# Patient Record
Sex: FEMALE | Race: BLACK OR AFRICAN AMERICAN | Employment: OTHER | URBAN - METROPOLITAN AREA
[De-identification: names, ages, dates, MRNs, and addresses within clinical notes are randomized per-mention and may not be internally consistent; named-entity substitution may affect disease eponyms.]

---

## 2017-01-09 ENCOUNTER — TELEPHONE ANTICOAG (OUTPATIENT)
Dept: CARDIOLOGY CLINIC | Age: 67
End: 2017-01-09

## 2017-01-09 DIAGNOSIS — I82.629 ACUTE VENOUS EMBOLISM AND THROMBOSIS OF DEEP VEINS OF UPPER EXTREMITY, UNSPECIFIED LATERALITY (HCC): ICD-10-CM

## 2017-01-09 LAB — INR, EXTERNAL: 4.1

## 2017-01-09 NOTE — PROGRESS NOTES
Verbal order and read back per Arron Ford, ROSSI  The INR is above the therapeutic range. Ask the patient about bleeding complications. Please make the following adjustments to Coumadin dosing: Hold X 1, 6 mg X 1, 3 mg X 1. Recheck INR on Thursday. Repeat the INR in 3 days.    Patient informed of instructions, read back and verbalized understanding Da Dover LPN

## 2017-01-12 ENCOUNTER — OFFICE VISIT (OUTPATIENT)
Dept: ORTHOPEDIC SURGERY | Facility: CLINIC | Age: 67
End: 2017-01-12

## 2017-01-12 VITALS
DIASTOLIC BLOOD PRESSURE: 80 MMHG | TEMPERATURE: 98 F | WEIGHT: 245 LBS | HEART RATE: 70 BPM | BODY MASS INDEX: 38.37 KG/M2 | SYSTOLIC BLOOD PRESSURE: 166 MMHG

## 2017-01-12 DIAGNOSIS — M25.562 LEFT KNEE PAIN, UNSPECIFIED CHRONICITY: ICD-10-CM

## 2017-01-12 DIAGNOSIS — M17.12 PRIMARY OSTEOARTHRITIS OF LEFT KNEE: Primary | ICD-10-CM

## 2017-01-12 DIAGNOSIS — M17.11 PRIMARY OSTEOARTHRITIS OF RIGHT KNEE: ICD-10-CM

## 2017-01-12 DIAGNOSIS — M25.462 KNEE EFFUSION, LEFT: ICD-10-CM

## 2017-01-12 RX ORDER — BUPIVACAINE HYDROCHLORIDE 2.5 MG/ML
4 INJECTION, SOLUTION EPIDURAL; INFILTRATION; INTRACAUDAL ONCE
Qty: 4 ML | Refills: 0
Start: 2017-01-12 | End: 2017-01-12

## 2017-01-12 RX ORDER — BUPIVACAINE HYDROCHLORIDE 2.5 MG/ML
10 INJECTION, SOLUTION EPIDURAL; INFILTRATION; INTRACAUDAL ONCE
Qty: 10 ML | Refills: 0
Start: 2017-01-12 | End: 2017-01-12

## 2017-01-12 RX ORDER — BETAMETHASONE SODIUM PHOSPHATE AND BETAMETHASONE ACETATE 3; 3 MG/ML; MG/ML
3 INJECTION, SUSPENSION INTRA-ARTICULAR; INTRALESIONAL; INTRAMUSCULAR; SOFT TISSUE ONCE
Qty: 0.5 ML | Refills: 0
Start: 2017-01-12 | End: 2017-01-12

## 2017-01-12 NOTE — PATIENT INSTRUCTIONS
Knee Arthritis: Exercises  Your Care Instructions  Here are some examples of exercises for knee arthritis. Start each exercise slowly. Ease off the exercise if you start to have pain. Your doctor or physical therapist will tell you when you can start these exercises and which ones will work best for you. How to do the exercises  Knee flexion with heel slide    1. Lie on your back with your knees bent. 2. Slide your heel back by bending your affected knee as far as you can. Then hook your other foot around your ankle to help pull your heel even farther back. 3. Hold for about 6 seconds, then rest for up to 10 seconds. 4. Repeat 8 to 12 times. 5. Switch legs and repeat steps 1 through 4, even if only one knee is sore. Quad sets    1. Sit with your affected leg straight and supported on the floor or a firm bed. Place a small, rolled-up towel under your knee. Your other leg should be bent, with that foot flat on the floor. 2. Tighten the thigh muscles of your affected leg by pressing the back of your knee down into the towel. 3. Hold for about 6 seconds, then rest for up to 10 seconds. 4. Repeat 8 to 12 times. 5. Switch legs and repeat steps 1 through 4, even if only one knee is sore. Straight-leg raises to the front    1. Lie on your back with your good knee bent so that your foot rests flat on the floor. Your affected leg should be straight. Make sure that your low back has a normal curve. You should be able to slip your hand in between the floor and the small of your back, with your palm touching the floor and your back touching the back of your hand. 2. Tighten the thigh muscles in your affected leg by pressing the back of your knee flat down to the floor. Hold your knee straight. 3. Keeping the thigh muscles tight and your leg straight, lift your affected leg up so that your heel is about 12 inches off the floor. Hold for about 6 seconds, then lower slowly.   4. Relax for up to 10 seconds between repetitions. 5. Repeat 8 to 12 times. 6. Switch legs and repeat steps 1 through 5, even if only one knee is sore. Active knee flexion    1. Lie on your stomach with your knees straight. If your kneecap is uncomfortable, roll up a washcloth and put it under your leg just above your kneecap. 2. Lift the foot of your affected leg by bending the knee so that you bring the foot up toward your buttock. If this motion hurts, try it without bending your knee quite as far. This may help you avoid any painful motion. 3. Slowly move your leg up and down. 4. Repeat 8 to 12 times. 5. Switch legs and repeat steps 1 through 4, even if only one knee is sore. Quadriceps stretch (facedown)    1. Lie flat on your stomach, and rest your face on the floor. 2. Wrap a towel or belt strap around the lower part of your affected leg. Then use the towel or belt strap to slowly pull your heel toward your buttock until you feel a stretch. 3. Hold for about 15 to 30 seconds, then relax your leg against the towel or belt strap. 4. Repeat 2 to 4 times. 5. Switch legs and repeat steps 1 through 4, even if only one knee is sore. Stationary exercise bike    If you do not have a stationary exercise bike at home, you can find one to ride at your local health club or community center. 1. Adjust the height of the bike seat so that your knee is slightly bent when your leg is extended downward. If your knee hurts when the pedal reaches the top, you can raise the seat so that your knee does not bend as much. 2. Start slowly. At first, try to do 5 to 10 minutes of cycling with little to no resistance. Then increase your time and the resistance bit by bit until you can do 20 to 30 minutes without pain. 3. If you start to have pain, rest your knee until your pain gets back to the level that is normal for you. Or cycle for less time or with less effort. Follow-up care is a key part of your treatment and safety.  Be sure to make and go to all appointments, and call your doctor if you are having problems. It's also a good idea to know your test results and keep a list of the medicines you take. Where can you learn more? Go to http://aren-mirella.info/. Enter C159 in the search box to learn more about \"Knee Arthritis: Exercises. \"  Current as of: May 23, 2016  Content Version: 11.1  © 2006-2016 [employer]. Care instructions adapted under license by [employer] (which disclaims liability or warranty for this information). If you have questions about a medical condition or this instruction, always ask your healthcare professional. [place] [address] any warranty or liability for your use of this information. Joint Injections: Care Instructions  Your Care Instructions  Joint injections are shots into a joint, such as the knee. They may be used to put in medicines, such as pain relievers. Or they can be used to take out fluid. Sometimes the fluid is tested in a lab. This can help find the cause of a joint problem. A corticosteroid, or steroid, shot is used to reduce inflammation in tendons or joints. It is often used to treat problems such as arthritis, tendinitis, and bursitis. Steroids can be injected directly into a painful, inflamed joint. They can also help reduce inflammation of a bursa. A bursa is a sac of fluid. It cushions and lubricates areas where tendons, ligaments, skin, muscles, or bones rub against each other. A steroid shot can sometimes help with short-term pain relief when other treatments haven't worked. If steroid shots help, pain may improve for weeks or months. Follow-up care is a key part of your treatment and safety. Be sure to make and go to all appointments, and call your doctor if you are having problems. It's also a good idea to know your test results and keep a list of the medicines you take. How can you care for yourself at home?   · Put ice or a cold pack on the area for 10 to 20 minutes at a time. Put a thin cloth between the ice and your skin. · Take anti-inflammatory medicines to reduce pain, swelling, or inflammation. These include ibuprofen (Advil, Motrin) and naproxen (Aleve). Read and follow all instructions on the label. · Avoid strenuous activities for several days, especially those that put stress on the area where you got the shot. · If you have dressings over the area, keep them clean and dry. You may remove them when your doctor tells you to. When should you call for help? Call your doctor now or seek immediate medical care if:  · You have signs of infection, such as:  ¨ Increased pain, swelling, warmth, or redness. ¨ Red streaks leading from the site. ¨ Pus draining from the site. ¨ A fever. Watch closely for changes in your health, and be sure to contact your doctor if you have any problems. Where can you learn more? Go to http://aren-mirella.info/. Enter N616 in the search box to learn more about \"Joint Injections: Care Instructions. \"  Current as of: May 23, 2016  Content Version: 11.1  © 7195-7395 [employer]. Care instructions adapted under license by [employer] (which disclaims liability or warranty for this information). If you have questions about a medical condition or this instruction, always ask your healthcare professional. [address] any warranty or liability for your use of this information.

## 2017-01-12 NOTE — PROGRESS NOTES
Patient: Kerrie Amaya                MRN: [id number]       SSN: [id number]  YOB: 1950        AGE: 77 y.o. SEX: female    PCP: Mina Gr MD  01/12/17    Chief Complaint   Patient presents with    Knee Pain     Knee     HISTORY:  Kerrie Amaya is a 77 y.o. female who is seen for left knee pain. She notes increased left knee pain since 12/30/16 when she bent her knee to take off her boots and felt a sharp pain. She notes pain with standing and walking. She denies any previous knee injury or trauma. She was previously seen in December of 2014 for left knee pain. She completed left knee Euflexxa series in December of 2014 with benefit. She reports a h/o gout. At times, her pain is severe, and she notes stiffness after sitting for long periods of time. She had temporary response to previous knee cortisone injections. Occupation, etc:  Ms. Fredis Jamison retired in [employer] [address] [place] [address] due to a total heart block requiring automatic defibrillator. She worked as a  in the [employer] in the 2nd grade. She recently retured from [place] to take care of her daughter who recently underwent robotic hysterectomy. She has three grandchildren--ages 23, 21, and 15. She is diabetic and hypertensive. She reports that her most recent HA1c value was 6.0. She reports that her weight is stable at about 245 pounds. She has a pacemaker for a heart problem and is currently taking Coumadin. Her mother is also a patient, Brittney Pike.       Lab Results   Component Value Date/Time    Hemoglobin A1c 6.2 08/28/2012 11:25 AM     Weight Metrics 1/12/2017 11/1/2016 4/11/2016 10/12/2015 7/20/2015 6/11/2015 3/2/2015   Weight 245 lb 244 lb 240 lb 248 lb 240 lb 237 lb 246 lb   BMI 38.37 kg/m2 38.22 kg/m2 37.58 kg/m2 38.83 kg/m2 37.58 kg/m2 37.11 kg/m2 38.52 kg/m2     Patient Active Problem List   Diagnosis Code    Cardiomyopathy, nonischemic (HCC) I42.9    Complete heart block ([address] I44.2    Biventricular implantable cardioverter-defibrillator in situ Z95.810    LBBB (left bundle branch block) I44.7    Type II or unspecified type diabetes mellitus with neurological manifestations, not stated as uncontrolled E11.49    Dyslipidemia E78.5    Diabetic peripheral neuropathy associated with type 2 diabetes mellitus ([facility]) E11.42    HALI (obstructive sleep apnea) G47.33    Pain due to any device, implant or graft T85.848A    AICD generator infection ([facility] [address] [address] T82. 7XXA    Difficult airway for intubation T88. 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 Cardiovascular: negative for chest pain, claudication, leg swelling, SOB, GROVER   Gastrointestinal: Negative for pain, N/V/C/D, Blood in stool or urine, dysuria,  hematuria, incontinence, pelvic pain. Musculoskeletal: See HPI   Neurological: Negative for dizziness and weakness. Negative for headaches, Visual changes, confusion, seizures   Phychiatric/Behavioral: Negative for depression, memory loss, substance  abuse. Extremities: Negative for hair changes, rash, or skin lesion changes. Hematologic: Negative for bleeding problems, bruising, pallor or swollen lymph  nodes   Peripheral Vascular: No calf pain, no circulation deficits.     Social History     Social History    Marital status:      Spouse name: N/A    Number of children: N/A    Years of education: N/A     Occupational History    Not on file. Social History Main Topics    Smoking status: Never Smoker    Smokeless tobacco: Never Used    Alcohol use No    Drug use: No    Sexual activity: Yes     Partners: Male     Other Topics Concern    Not on file     Social History Narrative      Allergies   Allergen Reactions    Vancomycin Itching    Ampicillin Itching    Bactrim [Sulfamethoxazole-Trimethoprim] Unknown (comments)    Ciprofloxacin Itching    Codeine Other (comments)     Jumpy feeling    Crestor [Rosuvastatin] Itching    Darvocet A500 [Propoxyphene N-Acetaminophen] Itching    Demerol [Meperidine] Itching    Levaquin [Levofloxacin] Itching    Lipitor [Atorvastatin] Myalgia    Magnesium Oxide Itching     nausea    Minocin [Minocycline] Unknown (comments)    Pcn [Penicillins] Itching    Pravachol [Pravastatin] Swelling     Swelling in mouth     Sulfa (Sulfonamide Antibiotics) Itching    Ultracet [Tramadol-Acetaminophen] Itching    Vicodin [Hydrocodone-Acetaminophen] Unknown (comments)    Vytorin 10-10 [Ezetimibe-Simvastatin] Myalgia    Percodan [Oxycodone Hcl-Oxycodone-Asa] Itching      Current Outpatient Prescriptions   Medication Sig    bumetanide (BUMEX) 1 mg tablet TAKE 1 TABLET TWICE A DAY    carvedilol (COREG) 25 mg tablet Take 1 Tab by mouth two (2) times a day.  pravastatin (PRAVACHOL) 40 mg tablet Take 40 mg by mouth nightly.  cyclobenzaprine (FLEXERIL) 10 mg tablet Take 10 mg by mouth as needed.  potassium chloride (KLOR-CON M20) 20 mEq tablet Take 2 Tabs by mouth two (2) times a day. Or as directed    insulin glargine (LANTUS) 100 unit/mL injection 20 Units by SubCUTAneous route two (2) times a day.  allopurinol (ZYLOPRIM) 100 mg tablet Take 100 mg by mouth two (2) times a day.  ferrous sulfate 325 mg (65 mg iron) tablet Take 1 Tab by mouth two (2) times daily (with meals).     Cholecalciferol, Vitamin D3, (VITAMIN D) 5,000 unit Tab Take 1 Tab by mouth daily.  warfarin (COUMADIN) 2 mg tablet TAKE 3 TABLETS DAILY OR AS DIRECTED BY PRESCRIBER'S OFFICE    warfarin (COUMADIN) 2 mg tablet Take 3 tablets by mouth daily or as directed by our office.  OTHER Take 2 Tabs by mouth two (2) times a day. NeuRx-TF - total formulation for peripheral nerve health    gabapentin (NEURONTIN) 300 mg tablet Take 300 mg by mouth two (2) times daily as needed.  sitaGLIPtin (JANUVIA) 50 mg tablet Take 1 Tab by mouth daily.  insulin aspart (NOVOLOG) 100 unit/mL injection INITIATE INSULIN CORRECTIVE PROTOCOL (HR): Normal Insulin Sensitivity  For Blood Sugar (mg/dL) of:    Less than 150 =   0 units          150 -199 =   2 units 200 -249 =   4 units 250 -299 =   6 units 300 -349 =   8 units 350 and above =   10 units If 2 glucose readings are above 200 mg/dL     No current facility-administered medications for this visit. PHYSICAL EXAMINATION:  Visit Vitals    /80 (BP 1 Location: Left arm, BP Patient Position: Sitting)    Pulse 70    Temp 98 °F (36.7 °C) (Oral)    Wt 245 lb (111.1 kg)    BMI 38.37 kg/m2      ORTHO EXAMINATION:  Examination Right knee Left knee   Skin Intact Intact   Range of motion 100-0 85-10   Effusion - +   Medial joint line tenderness + +   Lateral joint line tenderness + +   Popliteal tenderness - -   Osteophytes palpable + +   Jesus Manuels - -   Patella crepitus + +   Anterior drawer - -   Lateral laxity + +   Medial laxity - -   Varus deformity - -   Valgus deformity + +   Pretibial edema - -   Calf tenderness - -     PROCEDURE:  After timeout and under sterile conditions, Ms. Carol Whittaker had her left knee aspirated minimal cc of clear fluid. The fluid was discarded. After discussing treatment options, patient's left knee was injected with 4 cc Marcaine and 1/2 cc Celestone.     Chart reviewed for the following:   Christal Miller MD, have reviewed the History, Physical and updated the Allergic reactions for Carmen Rodriguez     TIME OUT performed immediately prior to start of procedure:  Sherry Haywood MD, have performed the following reviews on Carmen Rodriguez prior to the start of the procedure:            * Patient was identified by name and date of birth   * Agreement on procedure being performed was verified  * Risks and Benefits explained to the patient  * Procedure site verified and marked as necessary  * Patient was positioned for comfort  * Consent was obtained     Time: 9:21 AM     Date of procedure: 1/12/2017    Procedure performed by:  Zeeshan Pickard MD    Ms. Gonzalez tolerated the procedure well with no complications. RADIOGRAPHS:  XR LEFT KNEE 2/28/11  IMPRESSION:  Degenerative changes left knee. IMPRESSION:      ICD-10-CM ICD-9-CM    1. Primary osteoarthritis of left knee M17.12 715.16 betamethasone (CELESTONE SOLUSPAN) 6 mg/mL injection      BETAMETHASONE ACETATE & SODIUM PHOSPHATE INJECTION 3 MG EA.      DRAIN/INJECT LARGE JOINT/BURSA      bupivacaine, PF, (MARCAINE, PF,) 0.25 % (2.5 mg/mL) injection      bupivacaine, PF, (MARCAINE, PF,) 0.25 % (2.5 mg/mL) injection      PROCEDURE AUTHORIZATION TO     Severe, lateral compartment   2. Left knee pain, unspecified chronicity M25.562 719.46 betamethasone (CELESTONE SOLUSPAN) 6 mg/mL injection      BETAMETHASONE ACETATE & SODIUM PHOSPHATE INJECTION 3 MG EA.      DRAIN/INJECT LARGE JOINT/BURSA      bupivacaine, PF, (MARCAINE, PF,) 0.25 % (2.5 mg/mL) injection      bupivacaine, PF, (MARCAINE, PF,) 0.25 % (2.5 mg/mL) injection   3. Primary osteoarthritis of right knee M17.11 715.16     Severe, lateral compartment   4.  Knee effusion, left M25.462 719.06 betamethasone (CELESTONE SOLUSPAN) 6 mg/mL injection      BETAMETHASONE ACETATE & SODIUM PHOSPHATE INJECTION 3 MG EA.      DRAIN/INJECT LARGE JOINT/BURSA      bupivacaine, PF, (MARCAINE, PF,) 0.25 % (2.5 mg/mL) injection      bupivacaine, PF, (MARCAINE, PF,) 0.25 % (2.5 mg/mL) injection PLAN:  After timeout and under sterile conditions, Ms. Verito Reid had her left knee aspirated minimal cc of clear fluid. The fluid was discarded. After discussing treatment options, patient's left knee was injected with 4 cc Marcaine and 1/2 cc Celestone. I will see her back in about one month. Consider visco supplementation if pain continues. She will be referred for bariatric surgery or medicl weight loss consultation. We discussed possible need for left knee arthroplasty at some time in the future pending weight loss below 200#. She has several medical risk factors which we discussed in detail today.      Scribed by [employer] ([facility]) as dictated by Mary Ferro MD

## 2017-01-31 ENCOUNTER — TELEPHONE ANTICOAG (OUTPATIENT)
Dept: CARDIOLOGY CLINIC | Age: 67
End: 2017-01-31

## 2017-01-31 DIAGNOSIS — I82.629 ACUTE VENOUS EMBOLISM AND THROMBOSIS OF DEEP VEINS OF UPPER EXTREMITY, UNSPECIFIED LATERALITY (HCC): ICD-10-CM

## 2017-01-31 LAB — INR, EXTERNAL: 2

## 2017-01-31 NOTE — PROGRESS NOTES
Verbal order and read back per Irene Partida NP  The INR is stable and therapeutic.  Continue same dose of coumadin and recheck in 2 weeks  Continue Coumadin 6mg daily except 4mg on Mon  Patient informed of instructions, read back and verbalized understanding Nancy Waters LPN

## 2017-02-09 ENCOUNTER — OFFICE VISIT (OUTPATIENT)
Dept: ORTHOPEDIC SURGERY | Facility: CLINIC | Age: 67
End: 2017-02-09

## 2017-02-09 VITALS
HEART RATE: 70 BPM | TEMPERATURE: 98 F | WEIGHT: 245 LBS | BODY MASS INDEX: 38.37 KG/M2 | DIASTOLIC BLOOD PRESSURE: 85 MMHG | SYSTOLIC BLOOD PRESSURE: 169 MMHG

## 2017-02-09 DIAGNOSIS — M17.11 PRIMARY OSTEOARTHRITIS OF RIGHT KNEE: ICD-10-CM

## 2017-02-09 DIAGNOSIS — Z95.0 PACEMAKER: ICD-10-CM

## 2017-02-09 DIAGNOSIS — M25.462 KNEE EFFUSION, LEFT: ICD-10-CM

## 2017-02-09 DIAGNOSIS — M25.562 LEFT KNEE PAIN, UNSPECIFIED CHRONICITY: ICD-10-CM

## 2017-02-09 DIAGNOSIS — M17.12 PRIMARY OSTEOARTHRITIS OF LEFT KNEE: Primary | ICD-10-CM

## 2017-02-09 RX ORDER — BETAMETHASONE SODIUM PHOSPHATE AND BETAMETHASONE ACETATE 3; 3 MG/ML; MG/ML
3 INJECTION, SUSPENSION INTRA-ARTICULAR; INTRALESIONAL; INTRAMUSCULAR; SOFT TISSUE ONCE
Qty: 0.5 ML | Refills: 0
Start: 2017-02-09 | End: 2017-02-09

## 2017-02-09 RX ORDER — BUPIVACAINE HYDROCHLORIDE 2.5 MG/ML
4 INJECTION, SOLUTION EPIDURAL; INFILTRATION; INTRACAUDAL ONCE
Qty: 4 ML | Refills: 0
Start: 2017-02-09 | End: 2017-02-09

## 2017-02-09 NOTE — PROGRESS NOTES
Patient: Humble Cho                MRN: [id number]       SSN: [id number]  YOB: 1950        AGE: 77 y.o. SEX: female    PCP: Stanislaw Still MD  02/09/17    Chief Complaint   Patient presents with    Knee Pain     Left     HISTORY:  Humble Cho is a 77 y.o. female who is seen for left knee pain. She notes increased left knee pain since 12/30/16 when she bent her knee to take off her boots and felt a sharp pain. She notes pain with standing and walking. She denies any previous knee injury or trauma. She was previously seen in December of 2014 for left knee pain. She completed left knee Euflexxa series in December of 2014 with benefit. She reports a h/o gout. At times, her pain is severe, and she notes stiffness after sitting for long periods of time. She had temporary response to previous knee cortisone injections. She reports that she has had left knee pain for the past 5 years. She sees a podiatrist, Dr. Ambrocio Correa, for right foot pain. Pain Assessment  2/9/2017   Location of Pain Knee   Location Modifiers Left   Severity of Pain 9   Quality of Pain Dull;Aching   Duration of Pain A few minutes   Frequency of Pain Several days a week   Aggravating Factors Bending   Limiting Behavior Some   Relieving Factors Rest     Occupation, etc:  Ms. Jones Calero retired in [address] [place] [address] due to a total heart block requiring automatic defibrillator. She previously worked as a  in the [employer] in the 2nd grade. She recently retured from [place] to take care of her daughter who recently underwent robotic hysterectomy. She has three grandchildren--ages 23, 21, and 15. She is diabetic and hypertensive. She reports that her weight is stable. Current weight is 245 pounds. She has a pacemaker for a heart problem and is currently taking Coumadin. Her mother is also a patient, Richard Cortez. She is planning on taking a trip to [place] in May for her niece's wedding.   She states that she no longer drives due to her knee pains. Lab Results   Component Value Date/Time    Hemoglobin A1c 6.2 08/28/2012 11:25 AM     Weight Metrics 2/9/2017 1/12/2017 11/1/2016 4/11/2016 10/12/2015 7/20/2015 6/11/2015   Weight 245 lb 245 lb 244 lb 240 lb 248 lb 240 lb 237 lb   BMI 38.37 kg/m2 38.37 kg/m2 38.22 kg/m2 37.58 kg/m2 38.83 kg/m2 37.58 kg/m2 37.11 kg/m2     Patient Active Problem List   Diagnosis Code    Cardiomyopathy, nonischemic ([facility]) I42.9    Complete heart block ([facility]) I44.2    Biventricular implantable cardioverter-defibrillator in situ Z95.810    LBBB (left bundle branch block) I44.7    Type II or unspecified type diabetes mellitus with neurological manifestations, not stated as uncontrolled E11.49    Dyslipidemia E78.5    Diabetic peripheral neuropathy associated with type 2 diabetes mellitus ([facility] [address] E11.42    HALI (obstructive sleep apnea) G47.33    Pain due to any device, implant or graft T85.848A    AICD generator infection ([facility] [address] T82. 7XXA    Difficult airway for intubation T88. 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 Cardiovascular: negative for chest pain, claudication, leg swelling, SOB, GROVER   Gastrointestinal: Negative for pain, N/V/C/D, Blood in stool or urine, dysuria,  hematuria, incontinence, pelvic pain.    Musculoskeletal: See HPI   Neurological: Negative for dizziness and weakness. Negative for headaches, Visual changes, confusion, seizures   Phychiatric/Behavioral: Negative for depression, memory loss, substance  abuse. Extremities: Negative for hair changes, rash, or skin lesion changes. Hematologic: Negative for bleeding problems, bruising, pallor or swollen lymph  nodes   Peripheral Vascular: No calf pain, no circulation deficits. Social History     Social History    Marital status:      Spouse name: N/A    Number of children: N/A    Years of education: N/A     Occupational History    Not on file. Social History Main Topics    Smoking status: Never Smoker    Smokeless tobacco: Never Used    Alcohol use No    Drug use: No    Sexual activity: Yes     Partners: Male     Other Topics Concern    Not on file     Social History Narrative      Allergies   Allergen Reactions    Vancomycin Itching    Ampicillin Itching    Bactrim [Sulfamethoxazole-Trimethoprim] Unknown (comments)    Ciprofloxacin Itching    Codeine Other (comments)     Jumpy feeling    Crestor [Rosuvastatin] Itching    Darvocet A500 [Propoxyphene N-Acetaminophen] Itching    Demerol [Meperidine] Itching    Levaquin [Levofloxacin] Itching    Lipitor [Atorvastatin] Myalgia    Magnesium Oxide Itching     nausea    Minocin [Minocycline] Unknown (comments)    Pcn [Penicillins] Itching    Pravachol [Pravastatin] Swelling     Swelling in mouth     Sulfa (Sulfonamide Antibiotics) Itching    Ultracet [Tramadol-Acetaminophen] Itching    Vicodin [Hydrocodone-Acetaminophen] Unknown (comments)    Vytorin 10-10 [Ezetimibe-Simvastatin] Myalgia    Percodan [Oxycodone Hcl-Oxycodone-Asa] Itching      Current Outpatient Prescriptions   Medication Sig    bumetanide (BUMEX) 1 mg tablet TAKE 1 TABLET TWICE A DAY    carvedilol (COREG) 25 mg tablet Take 1 Tab by mouth two (2) times a day.     cyclobenzaprine (FLEXERIL) 10 mg tablet Take 10 mg by mouth as needed.  OTHER Take 2 Tabs by mouth two (2) times a day. NeuRx-TF - total formulation for peripheral nerve health    potassium chloride (KLOR-CON M20) 20 mEq tablet Take 2 Tabs by mouth two (2) times a day. Or as directed    insulin glargine (LANTUS) 100 unit/mL injection 20 Units by SubCUTAneous route two (2) times a day.  Cholecalciferol, Vitamin D3, (VITAMIN D) 5,000 unit Tab Take 1 Tab by mouth daily.  warfarin (COUMADIN) 2 mg tablet TAKE 3 TABLETS DAILY OR AS DIRECTED BY PRESCRIBER'S OFFICE    pravastatin (PRAVACHOL) 40 mg tablet Take 40 mg by mouth nightly.  warfarin (COUMADIN) 2 mg tablet Take 3 tablets by mouth daily or as directed by our office.  gabapentin (NEURONTIN) 300 mg tablet Take 300 mg by mouth two (2) times daily as needed.  allopurinol (ZYLOPRIM) 100 mg tablet Take 100 mg by mouth two (2) times a day.  sitaGLIPtin (JANUVIA) 50 mg tablet Take 1 Tab by mouth daily.  ferrous sulfate 325 mg (65 mg iron) tablet Take 1 Tab by mouth two (2) times daily (with meals).  insulin aspart (NOVOLOG) 100 unit/mL injection INITIATE INSULIN CORRECTIVE PROTOCOL (HR): Normal Insulin Sensitivity  For Blood Sugar (mg/dL) of:    Less than 150 =   0 units          150 -199 =   2 units 200 -249 =   4 units 250 -299 =   6 units 300 -349 =   8 units 350 and above =   10 units If 2 glucose readings are above 200 mg/dL     No current facility-administered medications for this visit.        PHYSICAL EXAMINATION:  Visit Vitals    /85 (BP 1 Location: Left arm, BP Patient Position: Sitting)    Pulse 70    Temp 98 °F (36.7 °C) (Oral)    Wt 245 lb (111.1 kg)    BMI 38.37 kg/m2      ORTHO EXAMINATION:  Examination Right knee Left knee   Skin Intact Intact, varicose veins   Range of motion 100-0 85-10   Effusion - +   Medial joint line tenderness + +   Lateral joint line tenderness + +   Popliteal tenderness - -   Osteophytes palpable + +   Jesus Manuels - -   Patella crepitus + + Anterior drawer - -   Lateral laxity + +   Medial laxity - -   Varus deformity - -   Valgus deformity + +   Pretibial edema 2+ 2+   Calf tenderness - -     PROCEDURE:  After discussing treatment options, patient's left knee was injected with 4 cc Marcaine and 1/2 cc Celestone. Chart reviewed for the following:   Je Santamaria MD, have reviewed the History, Physical and updated the Allergic reactions for Parker Pernell     TIME OUT performed immediately prior to start of procedure:  I, David Green MD, have performed the following reviews on Kiana Pernell prior to the start of the procedure:            * Patient was identified by name and date of birth   * Agreement on procedure being performed was verified  * Risks and Benefits explained to the patient  * Procedure site verified and marked as necessary  * Patient was positioned for comfort  * Consent was obtained     Time: 9:55 AM     Date of procedure: 2/9/2017    Procedure performed by:  David Green MD    Ms. Gonzalez tolerated the procedure well with no complications. RADIOGRAPHS:  XR LEFT KNEE 2/28/11  IMPRESSION:  Degenerative changes left knee. IMPRESSION:      ICD-10-CM ICD-9-CM    1. Primary osteoarthritis of left knee M17.12 715.16 betamethasone (CELESTONE SOLUSPAN) 6 mg/mL injection      BETAMETHASONE ACETATE & SODIUM PHOSPHATE INJECTION 3 MG EA.      DRAIN/INJECT LARGE JOINT/BURSA      bupivacaine, PF, (MARCAINE, PF,) 0.25 % (2.5 mg/mL) injection      PROCEDURE AUTHORIZATION TO    2. Left knee pain, unspecified chronicity M25.562 719.46 betamethasone (CELESTONE SOLUSPAN) 6 mg/mL injection      BETAMETHASONE ACETATE & SODIUM PHOSPHATE INJECTION 3 MG EA.      DRAIN/INJECT LARGE JOINT/BURSA      bupivacaine, PF, (MARCAINE, PF,) 0.25 % (2.5 mg/mL) injection   3. Knee effusion, left M25.462 719.06    4. Primary osteoarthritis of right knee M17.11 715.16    5.  Pacemaker Z95.0 V45.01      PLAN:  After discussing treatment options, patient's left knee was injected with 4 cc Marcaine and 1/2 cc Celestone. I will see her back as needed. Consider visco supplementation if pain continues. She will be referred for bariatric surgery or medical weight loss consultation. We discussed possible need for left knee arthroplasty at some time in the future pending weight loss below 200#. She has several medical risk factors which we discussed in detail today.       Scribed by [employer] ([address] [place] [address] [address]) as dictated by Duke Kennedy MD

## 2017-02-09 NOTE — PATIENT INSTRUCTIONS
Knee Arthritis: Exercises  Your Care Instructions  Here are some examples of exercises for knee arthritis. Start each exercise slowly. Ease off the exercise if you start to have pain. Your doctor or physical therapist will tell you when you can start these exercises and which ones will work best for you. How to do the exercises  Knee flexion with heel slide    1. Lie on your back with your knees bent. 2. Slide your heel back by bending your affected knee as far as you can. Then hook your other foot around your ankle to help pull your heel even farther back. 3. Hold for about 6 seconds, then rest for up to 10 seconds. 4. Repeat 8 to 12 times. 5. Switch legs and repeat steps 1 through 4, even if only one knee is sore. Quad sets    1. Sit with your affected leg straight and supported on the floor or a firm bed. Place a small, rolled-up towel under your knee. Your other leg should be bent, with that foot flat on the floor. 2. Tighten the thigh muscles of your affected leg by pressing the back of your knee down into the towel. 3. Hold for about 6 seconds, then rest for up to 10 seconds. 4. Repeat 8 to 12 times. 5. Switch legs and repeat steps 1 through 4, even if only one knee is sore. Straight-leg raises to the front    1. Lie on your back with your good knee bent so that your foot rests flat on the floor. Your affected leg should be straight. Make sure that your low back has a normal curve. You should be able to slip your hand in between the floor and the small of your back, with your palm touching the floor and your back touching the back of your hand. 2. Tighten the thigh muscles in your affected leg by pressing the back of your knee flat down to the floor. Hold your knee straight. 3. Keeping the thigh muscles tight and your leg straight, lift your affected leg up so that your heel is about 12 inches off the floor. Hold for about 6 seconds, then lower slowly.   4. Relax for up to 10 seconds between repetitions. 5. Repeat 8 to 12 times. 6. Switch legs and repeat steps 1 through 5, even if only one knee is sore. Active knee flexion    1. Lie on your stomach with your knees straight. If your kneecap is uncomfortable, roll up a washcloth and put it under your leg just above your kneecap. 2. Lift the foot of your affected leg by bending the knee so that you bring the foot up toward your buttock. If this motion hurts, try it without bending your knee quite as far. This may help you avoid any painful motion. 3. Slowly move your leg up and down. 4. Repeat 8 to 12 times. 5. Switch legs and repeat steps 1 through 4, even if only one knee is sore. Quadriceps stretch (facedown)    1. Lie flat on your stomach, and rest your face on the floor. 2. Wrap a towel or belt strap around the lower part of your affected leg. Then use the towel or belt strap to slowly pull your heel toward your buttock until you feel a stretch. 3. Hold for about 15 to 30 seconds, then relax your leg against the towel or belt strap. 4. Repeat 2 to 4 times. 5. Switch legs and repeat steps 1 through 4, even if only one knee is sore. Stationary exercise bike    If you do not have a stationary exercise bike at home, you can find one to ride at your local health club or community center. 1. Adjust the height of the bike seat so that your knee is slightly bent when your leg is extended downward. If your knee hurts when the pedal reaches the top, you can raise the seat so that your knee does not bend as much. 2. Start slowly. At first, try to do 5 to 10 minutes of cycling with little to no resistance. Then increase your time and the resistance bit by bit until you can do 20 to 30 minutes without pain. 3. If you start to have pain, rest your knee until your pain gets back to the level that is normal for you. Or cycle for less time or with less effort. Follow-up care is a key part of your treatment and safety.  Be sure to make and go to all appointments, and call your doctor if you are having problems. It's also a good idea to know your test results and keep a list of the medicines you take. Where can you learn more? Go to http://aren-mirella.info/. Enter C159 in the search box to learn more about \"Knee Arthritis: Exercises. \"  Current as of: May 23, 2016  Content Version: 11.1  © 2006-2016 [employer]. Care instructions adapted under license by "[employer] (which disclaims liability or warranty for this information). If you have questions about a medical condition or this instruction, always ask your healthcare professional. [place] [address] any warranty or liability for your use of this information. Joint Injections: Care Instructions  Your Care Instructions  Joint injections are shots into a joint, such as the knee. They may be used to put in medicines, such as pain relievers. Or they can be used to take out fluid. Sometimes the fluid is tested in a lab. This can help find the cause of a joint problem. A corticosteroid, or steroid, shot is used to reduce inflammation in tendons or joints. It is often used to treat problems such as arthritis, tendinitis, and bursitis. Steroids can be injected directly into a painful, inflamed joint. They can also help reduce inflammation of a bursa. A bursa is a sac of fluid. It cushions and lubricates areas where tendons, ligaments, skin, muscles, or bones rub against each other. A steroid shot can sometimes help with short-term pain relief when other treatments haven't worked. If steroid shots help, pain may improve for weeks or months. Follow-up care is a key part of your treatment and safety. Be sure to make and go to all appointments, and call your doctor if you are having problems. It's also a good idea to know your test results and keep a list of the medicines you take. How can you care for yourself at home?   · Put ice or a cold pack on the area for 10 to 20 minutes at a time. Put a thin cloth between the ice and your skin. · Take anti-inflammatory medicines to reduce pain, swelling, or inflammation. These include ibuprofen (Advil, Motrin) and naproxen (Aleve). Read and follow all instructions on the label. · Avoid strenuous activities for several days, especially those that put stress on the area where you got the shot. · If you have dressings over the area, keep them clean and dry. You may remove them when your doctor tells you to. When should you call for help? Call your doctor now or seek immediate medical care if:  · You have signs of infection, such as:  ¨ Increased pain, swelling, warmth, or redness. ¨ Red streaks leading from the site. ¨ Pus draining from the site. ¨ A fever. Watch closely for changes in your health, and be sure to contact your doctor if you have any problems. Where can you learn more? Go to http://aren-mirella.info/. Enter N616 in the search box to learn more about \"Joint Injections: Care Instructions. \"  Current as of: May 23, 2016  Content Version: 11.1  © 5107-6858 [employer]. Care instructions adapted under license by [employer] (which disclaims liability or warranty for this information). If you have questions about a medical condition or this instruction, always ask your healthcare professional. [address] any warranty or liability for your use of this information.

## 2017-03-06 LAB — INR, EXTERNAL: 1.7

## 2017-03-07 ENCOUNTER — TELEPHONE ANTICOAG (OUTPATIENT)
Dept: CARDIOLOGY CLINIC | Age: 67
End: 2017-03-07

## 2017-03-07 DIAGNOSIS — I82.629 ACUTE VENOUS EMBOLISM AND THROMBOSIS OF DEEP VEINS OF UPPER EXTREMITY, UNSPECIFIED LATERALITY (HCC): ICD-10-CM

## 2017-03-13 ENCOUNTER — OFFICE VISIT (OUTPATIENT)
Dept: ORTHOPEDIC SURGERY | Facility: CLINIC | Age: 67
End: 2017-03-13

## 2017-03-13 VITALS
WEIGHT: 245 LBS | TEMPERATURE: 100.1 F | BODY MASS INDEX: 38.45 KG/M2 | HEART RATE: 96 BPM | HEIGHT: 67 IN | DIASTOLIC BLOOD PRESSURE: 74 MMHG | SYSTOLIC BLOOD PRESSURE: 151 MMHG

## 2017-03-13 DIAGNOSIS — E66.9 OBESITY (BMI 35.0-39.9 WITHOUT COMORBIDITY): ICD-10-CM

## 2017-03-13 DIAGNOSIS — Z95.0 PACEMAKER: ICD-10-CM

## 2017-03-13 DIAGNOSIS — M25.462 KNEE EFFUSION, LEFT: ICD-10-CM

## 2017-03-13 DIAGNOSIS — M17.11 PRIMARY OSTEOARTHRITIS OF RIGHT KNEE: ICD-10-CM

## 2017-03-13 DIAGNOSIS — R20.2 NUMBNESS AND TINGLING IN BOTH HANDS: ICD-10-CM

## 2017-03-13 DIAGNOSIS — R20.0 NUMBNESS AND TINGLING IN BOTH HANDS: ICD-10-CM

## 2017-03-13 DIAGNOSIS — M25.562 LEFT KNEE PAIN, UNSPECIFIED CHRONICITY: ICD-10-CM

## 2017-03-13 DIAGNOSIS — Z98.890 S/P BILATERAL CARPAL TUNNEL RELEASE: ICD-10-CM

## 2017-03-13 DIAGNOSIS — M17.12 PRIMARY OSTEOARTHRITIS OF LEFT KNEE: Primary | ICD-10-CM

## 2017-03-13 RX ORDER — BUPIVACAINE HYDROCHLORIDE 2.5 MG/ML
0.5 INJECTION, SOLUTION EPIDURAL; INFILTRATION; INTRACAUDAL ONCE
Qty: 0.5 ML | Refills: 0
Start: 2017-03-13 | End: 2017-03-13

## 2017-03-13 RX ORDER — BETAMETHASONE SODIUM PHOSPHATE AND BETAMETHASONE ACETATE 3; 3 MG/ML; MG/ML
3 INJECTION, SUSPENSION INTRA-ARTICULAR; INTRALESIONAL; INTRAMUSCULAR; SOFT TISSUE ONCE
Qty: 0.5 ML | Refills: 0
Start: 2017-03-13 | End: 2017-03-13

## 2017-03-13 NOTE — PROGRESS NOTES
Patient: Filiberto Hyman                MRN: [id number]       SSN: [id number]  YOB: 1950        AGE: 77 y.o. SEX: female    PCP: Francisco Nielsen MD  03/13/17    CC:  Bilateral hand and knee pain    HISTORY:  Filiberto Hyman is a 77 y.o. female who is seen for left knee and bilateral hand pain. She notes increased left knee pain since 12/30/16 when she bent her knee to take off her boots and felt a sharp pain. She notes pain with standing and walking. She denies any previous knee injury or trauma. She was previously seen in December of 2014 for left knee pain. She completed left knee Euflexxa series in December of 2014 with benefit. She reports a h/o gout. At times, her pain is severe, and she notes stiffness after sitting for long periods of time. She had temporary response to previous knee cortisone injections. She reports that she has had left knee pain for the past 5 years. She sees a podiatrist, Dr. Prisca Wong, for right foot pain. She was previously seen by Dr. Evie Rodriguez for bilateral hand pain about 20 years ago. She underwent bilateral CTR by Dr. Evie Rodriguez. She is experiencing burning, numbness, tingling, and a toothache sensation in her hands especially at night. Pain Assessment  3/13/2017   Location of Pain Knee   Location Modifiers Left   Severity of Pain 10   Quality of Pain Throbbing;Aching   Duration of Pain Persistent   Frequency of Pain -   Aggravating Factors -   Limiting Behavior -   Relieving Factors -     Occupation, etc:  Ms. Angelia Dupree retired in [address] [place] [address] due to a total heart block requiring automatic defibrillator. She previously worked as a  in the [employer] in the 2nd grade. Her  is planning to retire as a  at [employer] in [place] in 2018. She recently retured from [place] to take care of her daughter who recently underwent robotic hysterectomy. She has three grandchildren--ages 23, 21, and 15.   She is diabetic and hypertensive. She reports that her weight is stable. Current weight is 245 pounds. She has a pacemaker for a heart problem and is currently taking Coumadin. Her mother is also a patient, Richard Cortez. She is planning on taking a trip to [place] in May for her niece's wedding. She states that she no longer drives due to her knee pains. Lab Results   Component Value Date/Time    Hemoglobin A1c 6.2 08/28/2012 11:25 AM     Weight Metrics 3/13/2017 2/9/2017 1/12/2017 11/1/2016 4/11/2016 10/12/2015 7/20/2015   Weight 245 lb 245 lb 245 lb 244 lb 240 lb 248 lb 240 lb   BMI 38.37 kg/m2 38.37 kg/m2 38.37 kg/m2 38.22 kg/m2 37.58 kg/m2 38.83 kg/m2 37.58 kg/m2     Patient Active Problem List   Diagnosis Code    Cardiomyopathy, nonischemic ([facility]) I42.9    Complete heart block ([facility]) I44.2    Biventricular implantable cardioverter-defibrillator in situ Z95.810    LBBB (left bundle branch block) I44.7    Type II or unspecified type diabetes mellitus with neurological manifestations, not stated as uncontrolled E11.49    Dyslipidemia E78.5    Diabetic peripheral neuropathy associated with type 2 diabetes mellitus ([facility] [address] E11.42    HALI (obstructive sleep apnea) G47.33    Pain due to any device, implant or graft T85.848A    AICD generator infection ([facility] [address] T82. 7XXA    Difficult airway for intubation T88. 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 Cardiovascular: negative for chest pain, claudication, leg swelling, SOB, GROVER   Gastrointestinal: Negative for pain, N/V/C/D, Blood in stool or urine, dysuria,  hematuria, incontinence, pelvic pain. Musculoskeletal: See HPI   Neurological: Negative for dizziness and weakness. Negative for headaches, Visual changes, confusion, seizures   Phychiatric/Behavioral: Negative for depression, memory loss, substance  abuse. Extremities: Negative for hair changes, rash, or skin lesion changes. Hematologic: Negative for bleeding problems, bruising, pallor or swollen lymph  nodes   Peripheral Vascular: No calf pain, no circulation deficits. Social History     Social History    Marital status:      Spouse name: N/A    Number of children: N/A    Years of education: N/A     Occupational History    Not on file.      Social History Main Topics    Smoking status: Never Smoker    Smokeless tobacco: Never Used    Alcohol use No    Drug use: No    Sexual activity: Yes     Partners: Male     Other Topics Concern    Not on file     Social History Narrative      Allergies   Allergen Reactions    Vancomycin Itching    Ampicillin Itching    Bactrim [Sulfamethoxazole-Trimethoprim] Unknown (comments)    Ciprofloxacin Itching    Codeine Other (comments)     Jumpy feeling    Crestor [Rosuvastatin] Itching    Darvocet A500 [Propoxyphene N-Acetaminophen] Itching    Demerol [Meperidine] Itching    Levaquin [Levofloxacin] Itching    Lipitor [Atorvastatin] Myalgia    Magnesium Oxide Itching     nausea    Minocin [Minocycline] Unknown (comments)    Pcn [Penicillins] Itching    Pravachol [Pravastatin] Swelling     Swelling in mouth     Sulfa (Sulfonamide Antibiotics) Itching    Ultracet [Tramadol-Acetaminophen] Itching    Vicodin [Hydrocodone-Acetaminophen] Unknown (comments)    Vytorin 10-10 [Ezetimibe-Simvastatin] Myalgia    Percodan [Oxycodone Hcl-Oxycodone-Asa] Itching      Current Outpatient Prescriptions   Medication Sig    warfarin (COUMADIN) 2 mg tablet TAKE 3 TABLETS DAILY OR AS DIRECTED BY PRESCRIBER'S OFFICE    bumetanide (BUMEX) 1 mg tablet TAKE 1 TABLET TWICE A DAY    carvedilol (COREG) 25 mg tablet Take 1 Tab by mouth two (2) times a day.  pravastatin (PRAVACHOL) 40 mg tablet Take 40 mg by mouth nightly.  cyclobenzaprine (FLEXERIL) 10 mg tablet Take 10 mg by mouth as needed.  warfarin (COUMADIN) 2 mg tablet Take 3 tablets by mouth daily or as directed by our office.  OTHER Take 2 Tabs by mouth two (2) times a day. NeuRx-TF - total formulation for peripheral nerve health    potassium chloride (KLOR-CON M20) 20 mEq tablet Take 2 Tabs by mouth two (2) times a day. Or as directed    gabapentin (NEURONTIN) 300 mg tablet Take 300 mg by mouth two (2) times daily as needed.  insulin glargine (LANTUS) 100 unit/mL injection 20 Units by SubCUTAneous route two (2) times a day.  allopurinol (ZYLOPRIM) 100 mg tablet Take 100 mg by mouth two (2) times a day.  sitaGLIPtin (JANUVIA) 50 mg tablet Take 1 Tab by mouth daily.  ferrous sulfate 325 mg (65 mg iron) tablet Take 1 Tab by mouth two (2) times daily (with meals).  insulin aspart (NOVOLOG) 100 unit/mL injection INITIATE INSULIN CORRECTIVE PROTOCOL (HR): Normal Insulin Sensitivity  For Blood Sugar (mg/dL) of:    Less than 150 =   0 units          150 -199 =   2 units 200 -249 =   4 units 250 -299 =   6 units 300 -349 =   8 units 350 and above =   10 units If 2 glucose readings are above 200 mg/dL    Cholecalciferol, Vitamin D3, (VITAMIN D) 5,000 unit Tab Take 1 Tab by mouth daily. No current facility-administered medications for this visit.        PHYSICAL EXAMINATION:  Visit Vitals    /74    Pulse 96    Temp 100.1 °F (37.8 °C)    Ht 5' 7\" (1.702 m)    Wt 245 lb (111.1 kg)    BMI 38.37 kg/m2      ORTHO EXAMINATION:  Examination Right Hand Left Hand   Skin Intact Intact   Deformity - -   Swelling - -   Tenderness - -   Finger flexion Full Full   Finger extension Full Full   Sensation Normal Normal   Capillary refill Normal Normal   Heberden's nodes - -   Dupuytren's - -   Mild bilateral thenar atrophy  Full finger motion    Examination Right Wrist Left Wrist   Skin Intact Intact   Tenderness - -   Flexion 40 40   Extension 30 30   Deformity - -   Effusion - -   Tinel's sign - -   Phalen's test - -   Finklestein maneuver - -   Pain with thumb abduction - -     Examination Right knee Left knee   Skin Intact Intact, varicose veins   Range of motion 100-0 85-10   Effusion - +   Medial joint line tenderness + +   Lateral joint line tenderness + +   Popliteal tenderness - -   Osteophytes palpable + +   Jesus Manuels - -   Patella crepitus + +   Anterior drawer - -   Lateral laxity + +   Medial laxity - -   Varus deformity - -   Valgus deformity + +   Pretibial edema 3+ 2+   Calf tenderness - -     PROCEDURE:  After discussing treatment options, patient's knees were injected with 2 cc of Synvisc. After discussing treatment options, patient's carpal tunnels were injected with 1/2 cc Marcaine and 1/2 cc Celestone.     Chart reviewed for the following:   Tanya Holley MD, have reviewed the History, Physical and updated the Allergic reactions for Katrinka Panning     TIME OUT performed immediately prior to start of procedure:  I, Bette Tavarez MD, have performed the following reviews on Katrinka Panning prior to the start of the procedure:            * Patient was identified by name and date of birth   * Agreement on procedure being performed was verified  * Risks and Benefits explained to the patient  * Procedure site verified and marked as necessary  * Patient was positioned for comfort  * Consent was obtained     Time: 9:44 AM     Date of procedure: 3/13/2017    Procedure performed by:  Bette Tavarez MD    Ms. Carlos tolerated the procedure well with no complications. RADIOGRAPHS:  XR LEFT KNEE 2/28/11  IMPRESSION:  Degenerative changes left knee. IMPRESSION:      ICD-10-CM ICD-9-CM    1. Primary osteoarthritis of left knee M17.12 715.16 hyaluronate (SYNVISC) 16 mg/2 mL injection      [place] SYNVISC OR SYNVISC-ONE      [place] DRAIN/INJECT LARGE JOINT/BURSA   2. Left knee pain, unspecified chronicity M25.562 719.46 hyaluronate (SYNVISC) 16 mg/2 mL injection      [place] SYNVISC OR SYNVISC-ONE      [place] DRAIN/INJECT LARGE JOINT/BURSA   3. Knee effusion, left M25.462 719.06    4. Primary osteoarthritis of right knee M17.11 715.16 hyaluronate (SYNVISC) 16 mg/2 mL injection      [place] SYNVISC OR SYNVISC-ONE      [place] DRAIN/INJECT LARGE JOINT/BURSA   5. Pacemaker Z95.0 V45.01    6. Obesity (BMI 35.0-39.9 without comorbidity) ([facility]) E66.9 278.00    7. Numbness and tingling in both hands R20.2 782.0 [place] INJECT CARPAL TUNNEL      betamethasone (CELESTONE SOLUSPAN) 6 mg/mL injection      BETAMETHASONE ACETATE & SODIUM PHOSPHATE INJECTION 3 MG EA.      bupivacaine, PF, (MARCAINE, PF,) 0.25 % (2.5 mg/mL) injection      [place] INJECT CARPAL TUNNEL      betamethasone (CELESTONE SOLUSPAN) 6 mg/mL injection      BETAMETHASONE ACETATE & SODIUM PHOSPHATE INJECTION 3 MG EA.      bupivacaine, PF, (MARCAINE, PF,) 0.25 % (2.5 mg/mL) injection      EMG TWO EXTREMITIES UPPER      NCV/LAT SENSORY PER NERVE UP/LT      NCV/LAT SENSORY PER NERVE UP/RT   8. S/p bilateral carpal tunnel release Z98.890 V45.89      PLAN:  After discussing treatment options, patient's knees were injected with 2 cc of Synvisc. I will see her back in one week for her second Synvisc injections. After discussing treatment options, patient's carpal tunnels were injected with 1/2 cc Marcaine and 1/2 cc Celestone. We discussed possible need for left knee arthroplasty at some time in the future pending weight loss below 200#.   She has several medical risk factors which we discussed in detail today.  She will follow up in 5 weeks with the results of her BUE EMG/NCV study. We discussed possible repeat CTR in the future pending EMG/NCV study results.       Scribed by [employer] ([address]) as dictated by Darcie Valle MD

## 2017-03-13 NOTE — PATIENT INSTRUCTIONS
Knee Arthritis: Exercises  Your Care Instructions  Here are some examples of exercises for knee arthritis. Start each exercise slowly. Ease off the exercise if you start to have pain. Your doctor or physical therapist will tell you when you can start these exercises and which ones will work best for you. How to do the exercises  Knee flexion with heel slide    1. Lie on your back with your knees bent. 2. Slide your heel back by bending your affected knee as far as you can. Then hook your other foot around your ankle to help pull your heel even farther back. 3. Hold for about 6 seconds, then rest for up to 10 seconds. 4. Repeat 8 to 12 times. 5. Switch legs and repeat steps 1 through 4, even if only one knee is sore. Quad sets    1. Sit with your affected leg straight and supported on the floor or a firm bed. Place a small, rolled-up towel under your knee. Your other leg should be bent, with that foot flat on the floor. 2. Tighten the thigh muscles of your affected leg by pressing the back of your knee down into the towel. 3. Hold for about 6 seconds, then rest for up to 10 seconds. 4. Repeat 8 to 12 times. 5. Switch legs and repeat steps 1 through 4, even if only one knee is sore. Straight-leg raises to the front    1. Lie on your back with your good knee bent so that your foot rests flat on the floor. Your affected leg should be straight. Make sure that your low back has a normal curve. You should be able to slip your hand in between the floor and the small of your back, with your palm touching the floor and your back touching the back of your hand. 2. Tighten the thigh muscles in your affected leg by pressing the back of your knee flat down to the floor. Hold your knee straight. 3. Keeping the thigh muscles tight and your leg straight, lift your affected leg up so that your heel is about 12 inches off the floor. Hold for about 6 seconds, then lower slowly.   4. Relax for up to 10 seconds between repetitions. 5. Repeat 8 to 12 times. 6. Switch legs and repeat steps 1 through 5, even if only one knee is sore. Active knee flexion    1. Lie on your stomach with your knees straight. If your kneecap is uncomfortable, roll up a washcloth and put it under your leg just above your kneecap. 2. Lift the foot of your affected leg by bending the knee so that you bring the foot up toward your buttock. If this motion hurts, try it without bending your knee quite as far. This may help you avoid any painful motion. 3. Slowly move your leg up and down. 4. Repeat 8 to 12 times. 5. Switch legs and repeat steps 1 through 4, even if only one knee is sore. Quadriceps stretch (facedown)    1. Lie flat on your stomach, and rest your face on the floor. 2. Wrap a towel or belt strap around the lower part of your affected leg. Then use the towel or belt strap to slowly pull your heel toward your buttock until you feel a stretch. 3. Hold for about 15 to 30 seconds, then relax your leg against the towel or belt strap. 4. Repeat 2 to 4 times. 5. Switch legs and repeat steps 1 through 4, even if only one knee is sore. Stationary exercise bike    If you do not have a stationary exercise bike at home, you can find one to ride at your local health club or community center. 1. Adjust the height of the bike seat so that your knee is slightly bent when your leg is extended downward. If your knee hurts when the pedal reaches the top, you can raise the seat so that your knee does not bend as much. 2. Start slowly. At first, try to do 5 to 10 minutes of cycling with little to no resistance. Then increase your time and the resistance bit by bit until you can do 20 to 30 minutes without pain. 3. If you start to have pain, rest your knee until your pain gets back to the level that is normal for you. Or cycle for less time or with less effort. Follow-up care is a key part of your treatment and safety.  Be sure to make and go to all appointments, and call your doctor if you are having problems. It's also a good idea to know your test results and keep a list of the medicines you take. Where can you learn more? Go to http://aren-mirella.info/. Enter C159 in the search box to learn more about \"Knee Arthritis: Exercises. \"  Current as of: May 23, 2016  Content Version: 11.1  © 2006-2016 [employer]. Care instructions adapted under license by [employer] (which disclaims liability or warranty for this information). If you have questions about a medical condition or this instruction, always ask your healthcare professional. [address] any warranty or liability for your use of this information. Hyaluronic Acid (By injection)   Hyaluronic Acid (puy-ky-gds-ON-ate AS-id)  Treats severe pain in your knee due to osteoarthritis. Brand Name(s):Euflexxa, Gel-One, GenVisc 850, Hyalgan, Hymovis, Monovisc, Orthovisc, Supartz, Supartz FX   There may be other brand names for this medicine. When This Medicine Should Not Be Used: This medicine is not right for everyone. You should not receive it if you had an allergic reaction to hyaluronic acid or if you have a bleeding disorder. How to Use This Medicine:   Injectable  · Your doctor will tell you how many injections you will need. This medicine is injected into your knee joint. · A nurse or other health provider will give you this medicine. Drugs and Foods to Avoid:      Ask your doctor or pharmacist before using any other medicine, including over-the-counter medicines, vitamins, and herbal products. Warnings While Using This Medicine:   · Tell your doctor if you are pregnant or breastfeeding, or if you have any allergies, including to birds, feathers, or eggs. · Rest your knee for 48 hours after you receive an injection. Do not do strenuous, weightbearing activities, such as jogging or tennis.  Avoid activities that keep you standing for longer than 1 hour. Possible Side Effects While Using This Medicine:   Call your doctor right away if you notice any of these side effects:  · Allergic reaction: Itching or hives, swelling in your face or hands, swelling or tingling in your mouth or throat, chest tightness, trouble breathing  If you notice these less serious side effects, talk with your doctor:   · Mild increase in pain or swelling in your knee  · Pain, redness, or swelling where the medicine is injected  If you notice other side effects that you think are caused by this medicine, tell your doctor. Call your doctor for medical advice about side effects. You may report side effects to FDA at 3-370-BJF-3535  © 2016 [address] is for End User's use only and may not be sold, redistributed or otherwise used for commercial purposes. The above information is an  only. It is not intended as medical advice for individual conditions or treatments. Talk to your doctor, nurse or pharmacist before following any medical regimen to see if it is safe and effective for you. Electromyogram (EMG) and Nerve Conduction Studies: About These Tests  What are they? An electromyogram (EMG) measures the electrical activity of your muscles when you are not using them (at rest) and when you tighten them (muscle contraction). Nerve conduction studies (NCS) measure how well and how fast the nerves can send electrical signals. EMG and nerve conduction studies are often done together. If they are done together, the nerve conduction studies are done before the EMG. Why are they done? You may need an EMG to find diseases that damage your muscles or nerves or to find why you cannot move your muscles (paralysis), why they feel weak, or why they twitch. You may need nerve conduction studies to find damage to the nerves that lead from the brain and spinal cord to the rest of the body (peripheral nervous system). Nerve conduction studies are often used to help find nerve disorders, such as carpal tunnel syndrome. How can you prepare for these tests? · Tell your doctors ALL the medicines, vitamins, supplements, and herbal remedies you take. Some medicines can affect the test results. You may need to stop taking some medicines before you have this test.  · If you take aspirin or some other blood thinner, be sure to talk to your doctor. He or she will tell you if you should stop taking it before your test. Make sure that you understand exactly what your doctor wants you to do. · Wear loose-fitting clothing. You may be given a hospital gown to wear. · The electrodes for the test are attached to your skin. Your skin needs to be clean and free of sprays, oils, creams, and lotions. What happens during the tests? You lie on a table or bed or sit in a reclining chair so your muscles are relaxed. For an EMG:  · Your doctor will insert a needle electrode into a muscle. This will record the electrical activity while the muscle is at rest. You may feel a quick, sharp pain when the needle electrode is put into a muscle. · Your doctor will ask you to tighten the same muscle slowly and steadily while the electrical activity is recorded. · Your doctor may move the electrode to a different area of the muscle or a different muscle. For nerve conduction studies:  · Your doctor will attach two types of electrodes to your skin. ¨ One type of electrode is placed over a nerve and will give the nerve an electrical pulse. ¨ The other type of electrode is placed over the muscle that the nerve controls. It will record how long it takes the muscle to react to the electrical pulse. · You will be able to feel the electrical pulses. They are small shocks and are safe. What else should you know about these tests? · After an EMG, you may be sore and have a tingling feeling in your muscles for up to 2 days.  You may have small bruises or swelling at the needle site. · For an EMG, you may be asked to sign a consent form. Talk to your doctor about any concerns you have about the need for the test, its risks, how it will be done, or what the results will mean. How long do they take? · An EMG may take 30 to 60 minutes. · Nerve conduction tests may take from 15 minutes to 1 hour or more. It depends on how many nerves and muscles your doctor tests. What happens after these tests? · If any of the test areas are sore:  ¨ Put ice or a cold pack on the area for 10 to 20 minutes at a time. Put a thin cloth between the ice and your skin. ¨ Take an over-the-counter pain medicine, such as acetaminophen (Tylenol), ibuprofen (Advil, Motrin), or naproxen (Aleve). Be safe with medicines. Read and follow all instructions on the label. · You will probably be able to go home right away. · You can go back to your usual activities right away. When should you call for help? Watch closely for changes in your health, and be sure to contact your doctor if:  · Muscle pain from an EMG test gets worse or you have swelling, tenderness, or pus at any of the needle sites. · You have any problems that you think may be from the test.  · You have any questions about the test or have not received your results. Follow-up care is a key part of your treatment and safety. Be sure to make and go to all appointments, and call your doctor if you are having problems. It's also a good idea to keep a list of the medicines you take. Ask your doctor when you can expect to have your test results. Where can you learn more? Go to http://aren-mirella.info/. Enter B253 in the search box to learn more about \"Electromyogram (EMG) and Nerve Conduction Studies: About These Tests. \"  Current as of: June 1, 2016  Content Version: 11.1  © 9246-4013 [employer].  Care instructions adapted under license by [employer] (which disclaims liability or warranty for this information). If you have questions about a medical condition or this instruction, always ask your healthcare professional. [place] [address] any warranty or liability for your use of this information.

## 2017-03-13 NOTE — MR AVS SNAPSHOT
Visit Information Date & Time Provider Department Dept. Phone Encounter #  
 3/13/2017  8:55 AM Miriam Jimenez MD [facility]  Follow-up Instructions Return in about 1 week (around 3/20/2017). Your Appointments 3/20/2017  9:45 AM  
Follow Up with Miriam Jimenez MD  
[facility]) Appt Note: Synvisc/left knee-2/3  
 [address], [address]  
  
   
 [address], [address] [address]  
  
    
 3/27/2017  9:45 AM  
Follow Up with Miriam Jimenez MD  
[address] [facility], [address] and [facility]) Appt Note: Synvisc/left knee-2/3  
 [address], [address] [place] [address]  
[phone] 5/17/2017  9:20 AM  
Follow Up with Marv George DO [facility]"" ([facility]) Appt Note: 11/1/16 - Return in about 6 months - pt would Thursday morning - [address] [facility] [address] [address] [address] [address]  
[phone] [address] [facility] [address] 5/18/2017  9:00 AM  
Nurse Visit with [address] [facility] ([facility]) Appt Note: orientation height 5 ft 7 , weight 240lbs HR Metabolic Program ([facility]) [phone]  
  
    
 6/29/2017  9:00 AM  
PROCEDURE with [facility] \[facility]\"" ([facility]) Appt Note: 6 month device check [place] [address] [address] [id number] [phone] [address] [facility] [address] Upcoming Health Maintenance Date Due Hepatitis C Screening 1950 FOOT EXAM Q1 7/27/1960 MICROALBUMIN Q1 7/27/1960 EYE EXAM RETINAL OR DILATED Q1 7/27/1960 DTaP/Tdap/Td series (1 - Tdap) 7/27/1971 FOBT Q 1 YEAR AGE 50-75 7/27/2000 ZOSTER VACCINE AGE 60> 7/27/2010 BREAST CANCER SCRN MAMMOGRAM 1/19/2014 HEMOGLOBIN A1C Q6M 5/6/2015 GLAUCOMA SCREENING Q2Y 7/27/2015 OSTEOPOROSIS SCREENING (DEXA) 7/27/2015 Pneumococcal 65+ High/Highest Risk (1 of 2 - PCV13) 7/27/2015 MEDICARE YEARLY EXAM 7/27/2015 LIPID PANEL Q1 11/6/2015 INFLUENZA AGE 9 TO ADULT 8/1/2016 Allergies as of 3/13/2017  Review Complete On: 3/13/2017 By: Alana Hartmann Severity Noted Reaction Type Reactions Vancomycin High 08/17/2012   Side Effect Itching Ampicillin  06/11/2010    Itching Bactrim [Sulfamethoxazole-trimethoprim]  06/11/2010    Unknown (comments) Ciprofloxacin  06/11/2010    Itching Codeine  06/11/2010    Other (comments) Jumpy feeling Crestor [Rosuvastatin]  06/11/2010    Itching Darvocet A500 [Propoxyphene N-acetaminophen]  06/11/2010    Itching Demerol [Meperidine]  06/11/2010    Itching Levaquin [Levofloxacin]  05/20/2014    Itching Lipitor [Atorvastatin]  06/11/2010    Myalgia Magnesium Oxide  06/28/2013    Itching  
 nausea Minocin [Minocycline]  06/11/2010    Unknown (comments) Pcn [Penicillins]  06/11/2010    Itching Pravachol [Pravastatin]  06/11/2010    Swelling Swelling in mouth Sulfa (Sulfonamide Antibiotics)  06/11/2010    Itching Ultracet [Tramadol-acetaminophen]  06/11/2010    Itching Vicodin [Hydrocodone-acetaminophen]  06/11/2010    Unknown (comments) Vytorin 10-10 [Ezetimibe-simvastatin]  06/11/2010    Myalgia Percodan [Oxycodone Hcl-oxycodone-asa] Low 06/11/2010   Side Effect Itching Current Immunizations  Never Reviewed No immunizations on file. Not reviewed this visit You Were Diagnosed With   
  
 Codes Comments Primary osteoarthritis of left knee    -  Primary ICD-10-CM: M17.12 
ICD-9-CM: 715.16 Left knee pain, unspecified chronicity     ICD-10-CM: W14.989 ICD-9-CM: 719.46   
 Knee effusion, left     ICD-10-CM: M25.462 ICD-9-CM: 719.06   
 Primary osteoarthritis of right knee     ICD-10-CM: M17.11 ICD-9-CM: 715.16 Pacemaker     ICD-10-CM: Z95.0 ICD-9-CM: V45.01 Obesity (BMI 35.0-39.9 without comorbidity) ([facility] [address]     ICD-10-CM: K36.7 ICD-9-CM: 278.00 Numbness and tingling in both hands     ICD-10-CM: R20.2 ICD-9-CM: 782.0 S/p bilateral carpal tunnel release     ICD-10-CM: Z98.890 ICD-9-CM: V45.89 Vitals BP Pulse Temp Height(growth percentile) Weight(growth percentile) BMI  
 151/74 96 100.1 °F (37.8 °C) 5' 7\" (1.702 m) 245 lb (111.1 kg) 38.37 kg/m2 OB Status Smoking Status Hysterectomy Never Smoker Vitals History BMI and BSA Data Body Mass Index Body Surface Area  
 38.37 kg/m 2 2.29 m 2 Preferred Pharmacy Pharmacy Name Phone [address], [facility] [phone] Your Updated Medication List  
  
   
This list is accurate as of: 3/13/17  9:50 AM.  Always use your most recent med list.  
  
  
  
  
 allopurinol 100 mg tablet Commonly known as:  Cyntha Dicker Take 100 mg by mouth two (2) times a day. bumetanide 1 mg tablet Commonly known as:  Merla Goods TAKE 1 TABLET TWICE A DAY  
  
 carvedilol 25 mg tablet Commonly known as:  Jestine Pellet Take 1 Tab by mouth two (2) times a day. cyclobenzaprine 10 mg tablet Commonly known as:  FLEXERIL Take 10 mg by mouth as needed. ferrous sulfate 325 mg (65 mg iron) tablet Take 1 Tab by mouth two (2) times daily (with meals). gabapentin 300 mg tablet Commonly known as:  NEURONTIN Take 300 mg by mouth two (2) times daily as needed. insulin aspart 100 unit/mL injection Commonly known as:  Landy Burke INITIATE INSULIN CORRECTIVE PROTOCOL (HR): Normal Insulin Sensitivity  For Blood Sugar (mg/dL) of:    Less than 150 =   0 units          150 -199 = 2 units 200 -249 =   4 units 250 -299 =   6 units 300 -349 =   8 units 350 and above =   10 units If 2 glucose readings are above 200 mg/dL LANTUS 100 unit/mL injection Generic drug:  insulin glargine 20 Units by SubCUTAneous route two (2) times a day. OTHER Take 2 Tabs by mouth two (2) times a day. NeuRx-TF - total formulation for peripheral nerve health  
  
 potassium chloride 20 mEq tablet Commonly known as:  KLOR-CON M20 Take 2 Tabs by mouth two (2) times a day. Or as directed  
  
 pravastatin 40 mg tablet Commonly known as:  PRAVACHOL Take 40 mg by mouth nightly. SITagliptin 50 mg tablet Commonly known as:  Loren Pierini Take 1 Tab by mouth daily. VITAMIN D 5,000 unit Tab tablet Generic drug:  cholecalciferol (VITAMIN D3) Take 1 Tab by mouth daily. * warfarin 2 mg tablet Commonly known as:  COUMADIN Take 3 tablets by mouth daily or as directed by our office. * warfarin 2 mg tablet Commonly known as:  COUMADIN  
TAKE 3 TABLETS DAILY OR AS DIRECTED BY PRESCRIBER'S OFFICE * Notice: This list has 2 medication(s) that are the same as other medications prescribed for you. Read the directions carefully, and ask your doctor or other care provider to review them with you. Follow-up Instructions Return in about 1 week (around 3/20/2017). Patient Instructions Knee Arthritis: Exercises Your Care Instructions Here are some examples of exercises for knee arthritis. Start each exercise slowly. Ease off the exercise if you start to have pain. Your doctor or physical therapist will tell you when you can start these exercises and which ones will work best for you. How to do the exercises Knee flexion with heel slide 1. Lie on your back with your knees bent. 2. Slide your heel back by bending your affected knee as far as you can. Then hook your other foot around your ankle to help pull your heel even farther back. 3. Hold for about 6 seconds, then rest for up to 10 seconds. 4. Repeat 8 to 12 times. 5. Switch legs and repeat steps 1 through 4, even if only one knee is sore. [facility] [employer] 1. Sit with your affected leg straight and supported on the floor or a firm bed. Place a small, rolled-up towel under your knee. Your other leg should be bent, with that foot flat on the floor. 2. Tighten the thigh muscles of your affected leg by pressing the back of your knee down into the towel. 3. Hold for about 6 seconds, then rest for up to 10 seconds. 4. Repeat 8 to 12 times. 5. Switch legs and repeat steps 1 through 4, even if only one knee is sore. Straight-leg raises to the front 1. Lie on your back with your good knee bent so that your foot rests flat on the floor. Your affected leg should be straight. Make sure that your low back has a normal curve. You should be able to slip your hand in between the floor and the small of your back, with your palm touching the floor and your back touching the back of your hand. 2. Tighten the thigh muscles in your affected leg by pressing the back of your knee flat down to the floor. Hold your knee straight. 3. Keeping the thigh muscles tight and your leg straight, lift your affected leg up so that your heel is about 12 inches off the floor. Hold for about 6 seconds, then lower slowly. 4. Relax for up to 10 seconds between repetitions. 5. Repeat 8 to 12 times. 6. Switch legs and repeat steps 1 through 5, even if only one knee is sore. Active knee flexion 1. Lie on your stomach with your knees straight. If your kneecap is uncomfortable, roll up a washcloth and put it under your leg just above your kneecap. 2. Lift the foot of your affected leg by bending the knee so that you bring the foot up toward your buttock. If this motion hurts, try it without bending your knee quite as far. This may help you avoid any painful motion. 3. Slowly move your leg up and down. 4. Repeat 8 to 12 times. 5. Switch legs and repeat steps 1 through 4, even if only one knee is sore. Quadriceps stretch (facedown) 1. Lie flat on your stomach, and rest your face on the floor. 2. Wrap a towel or belt strap around the lower part of your affected leg. Then use the towel or belt strap to slowly pull your heel toward your buttock until you feel a stretch. 3. Hold for about 15 to 30 seconds, then relax your leg against the towel or belt strap. 4. Repeat 2 to 4 times. 5. Switch legs and repeat steps 1 through 4, even if only one knee is sore. Stationary exercise bike If you do not have a stationary exercise bike at home, you can find one to ride at your local health club or community center. 1. Adjust the height of the bike seat so that your knee is slightly bent when your leg is extended downward. If your knee hurts when the pedal reaches the top, you can raise the seat so that your knee does not bend as much. 2. Start slowly. At first, try to do 5 to 10 minutes of cycling with little to no resistance. Then increase your time and the resistance bit by bit until you can do 20 to 30 minutes without pain. 3. If you start to have pain, rest your knee until your pain gets back to the level that is normal for you. Or cycle for less time or with less effort. Follow-up care is a key part of your treatment and safety. Be sure to make and go to all appointments, and call your doctor if you are having problems. It's also a good idea to know your test results and keep a list of the medicines you take. Where can you learn more? Go to http://aren-mirella.info/. Enter C159 in the search box to learn more about \"Knee Arthritis: Exercises. \" Current as of: May 23, 2016 Content Version: 11.1 © 4011-4457 [employer], Incorporated.  Care instructions adapted under license by [employer] (which disclaims liability or warranty for this information). If you have questions about a medical condition or this instruction, always ask your healthcare professional. [address] any warranty or liability for your use of this information. Hyaluronic Acid (By injection) Hyaluronic Acid (gaf-or-shy-ON-ate AS-id) Treats severe pain in your knee due to osteoarthritis. Brand Name(s):Euflexxa, Gel-One, GenVisc 850, Hyalgan, Hymovis, Monovisc, Orthovisc, Supartz, Supartz FX There may be other brand names for this medicine. When This Medicine Should Not Be Used: This medicine is not right for everyone. You should not receive it if you had an allergic reaction to hyaluronic acid or if you have a bleeding disorder. How to Use This Medicine:  
Injectable · Your doctor will tell you how many injections you will need. This medicine is injected into your knee joint. · A nurse or other health provider will give you this medicine. Drugs and Foods to Avoid: Ask your doctor or pharmacist before using any other medicine, including over-the-counter medicines, vitamins, and herbal products. Warnings While Using This Medicine: · Tell your doctor if you are pregnant or breastfeeding, or if you have any allergies, including to birds, feathers, or eggs. · Rest your knee for 48 hours after you receive an injection. Do not do strenuous, weightbearing activities, such as jogging or tennis. Avoid activities that keep you standing for longer than 1 hour. Possible Side Effects While Using This Medicine:  
Call your doctor right away if you notice any of these side effects: · Allergic reaction: Itching or hives, swelling in your face or hands, swelling or tingling in your mouth or throat, chest tightness, trouble breathing If you notice these less serious side effects, talk with your doctor: · Mild increase in pain or swelling in your knee · Pain, redness, or swelling where the medicine is injected If you notice other side effects that you think are caused by this medicine, tell your doctor. Call your doctor for medical advice about side effects. You may report side effects to FDA at 8-258-RQG-4290 © 2016 [address] is for End User's use only and may not be sold, redistributed or otherwise used for commercial purposes. The above information is an  only. It is not intended as medical advice for individual conditions or treatments. Talk to your doctor, nurse or pharmacist before following any medical regimen to see if it is safe and effective for you. Electromyogram (EMG) and Nerve Conduction Studies: About These Tests What are they? An electromyogram (EMG) measures the electrical activity of your muscles when you are not using them (at rest) and when you tighten them (muscle contraction). Nerve conduction studies (NCS) measure how well and how fast the nerves can send electrical signals. EMG and nerve conduction studies are often done together. If they are done together, the nerve conduction studies are done before the EMG. Why are they done? You may need an EMG to find diseases that damage your muscles or nerves or to find why you cannot move your muscles (paralysis), why they feel weak, or why they twitch. You may need nerve conduction studies to find damage to the nerves that lead from the brain and spinal cord to the rest of the body (peripheral nervous system). Nerve conduction studies are often used to help find nerve disorders, such as carpal tunnel syndrome. How can you prepare for these tests? · Tell your doctors ALL the medicines, vitamins, supplements, and herbal remedies you take. Some medicines can affect the test results. You may need to stop taking some medicines before you have this test. 
· If you take aspirin or some other blood thinner, be sure to talk to your doctor.  He or she will tell you if you should stop taking it before your test. Make sure that you understand exactly what your doctor wants you to do. · Wear loose-fitting clothing. You may be given a hospital gown to wear. · The electrodes for the test are attached to your skin. Your skin needs to be clean and free of sprays, oils, creams, and lotions. What happens during the tests? You lie on a table or bed or sit in a reclining chair so your muscles are relaxed. For an EMG: 
· Your doctor will insert a needle electrode into a muscle. This will record the electrical activity while the muscle is at rest. You may feel a quick, sharp pain when the needle electrode is put into a muscle. · Your doctor will ask you to tighten the same muscle slowly and steadily while the electrical activity is recorded. · Your doctor may move the electrode to a different area of the muscle or a different muscle. For nerve conduction studies: 
· Your doctor will attach two types of electrodes to your skin. ¨ One type of electrode is placed over a nerve and will give the nerve an electrical pulse. ¨ The other type of electrode is placed over the muscle that the nerve controls. It will record how long it takes the muscle to react to the electrical pulse. · You will be able to feel the electrical pulses. They are small shocks and are safe. What else should you know about these tests? · After an EMG, you may be sore and have a tingling feeling in your muscles for up to 2 days. You may have small bruises or swelling at the needle site. · For an EMG, you may be asked to sign a consent form. Talk to your doctor about any concerns you have about the need for the test, its risks, how it will be done, or what the results will mean. How long do they take? · An EMG may take 30 to 60 minutes. · Nerve conduction tests may take from 15 minutes to 1 hour or more. It depends on how many nerves and muscles your doctor tests. What happens after these tests? · If any of the test areas are sore: ¨ Put ice or a cold pack on the area for 10 to 20 minutes at a time. Put a thin cloth between the ice and your skin. ¨ Take an over-the-counter pain medicine, such as acetaminophen (Tylenol), ibuprofen (Advil, Motrin), or naproxen (Aleve). Be safe with medicines. Read and follow all instructions on the label. · You will probably be able to go home right away. · You can go back to your usual activities right away. When should you call for help? Watch closely for changes in your health, and be sure to contact your doctor if: · Muscle pain from an EMG test gets worse or you have swelling, tenderness, or pus at any of the needle sites. · You have any problems that you think may be from the test. 
· You have any questions about the test or have not received your results. Follow-up care is a key part of your treatment and safety. Be sure to make and go to all appointments, and call your doctor if you are having problems. It's also a good idea to keep a list of the medicines you take. Ask your doctor when you can expect to have your test results. Where can you learn more? Go to http://aren-mirella.info/. Enter H656 in the search box to learn more about \"Electromyogram (EMG) and Nerve Conduction Studies: About These Tests. \" Current as of: June 1, 2016 Content Version: 11.1 © 5785-3444 [employer], Incorporated. Care instructions adapted under license by [employer] (which disclaims liability or warranty for this information). If you have questions about a medical condition or this instruction, always ask your healthcare professional. [address] any warranty or liability for your use of this information. Introducing [facility]! Mary Grace Hopkins introduces [employer] patient portal. Now you can access parts of your medical record, email your doctor's office, and request medication refills online.    
 
1. In your internet browser, go to [email or web address] 2. Click on the First Time User? Click Here link in the Sign In box. You will see the New Member Sign Up page. 3. Enter your ElationEMR Access Code exactly as it appears below. You will not need to use this code after youve completed the sign-up process. If you do not sign up before the expiration date, you must request a new code. · ElationEMR Access Code: [email or web address] Expires: 5/1/2017  6:05 PM 
 
4. Enter the last four digits of your Social Security Number (xxxx) and Date of Birth (mm/dd/yyyy) as indicated and click Submit. You will be taken to the next sign-up page. 5. Create a Hydrocapsu[employer] ID. This will be your ElationEMR login ID and cannot be changed, so think of one that is secure and easy to remember. 6. Create a ElationEMR password. You can change your password at any time. 7. Enter your Password Reset Question and Answer. This can be used at a later time if you forget your password. 8. Enter your e-mail address. You will receive e-mail notification when new information is available in [address] 9. Click Sign Up. You can now view and download portions of your medical record. 10. Click the Download Summary menu link to download a portable copy of your medical information. If you have questions, please visit the Frequently Asked Questions section of the ElationEMR website. Remember, ElationEMR is NOT to be used for urgent needs. For medical emergencies, dial 911. Now available from your iPhone and Android! Please provide this summary of care documentation to your next provider. Your primary care clinician is listed as BRYANNA BIRD. If you have any questions after today's visit, please call [phone].

## 2017-03-14 ENCOUNTER — HOSPITAL ENCOUNTER (EMERGENCY)
Age: 67
Discharge: HOME | End: 2017-03-14
Attending: EMERGENCY MEDICINE
Payer: COMMERCIAL

## 2017-03-14 ENCOUNTER — APPOINTMENT (OUTPATIENT)
Dept: GENERAL RADIOLOGY | Age: 67
End: 2017-03-14
Attending: PHYSICIAN ASSISTANT
Payer: COMMERCIAL

## 2017-03-14 VITALS
WEIGHT: 243 LBS | RESPIRATION RATE: 16 BRPM | SYSTOLIC BLOOD PRESSURE: 116 MMHG | HEART RATE: 81 BPM | TEMPERATURE: 101.9 F | OXYGEN SATURATION: 97 % | DIASTOLIC BLOOD PRESSURE: 51 MMHG | BODY MASS INDEX: 38.14 KG/M2 | HEIGHT: 67 IN

## 2017-03-14 DIAGNOSIS — N12 PYELONEPHRITIS: Primary | ICD-10-CM

## 2017-03-14 LAB
ALBUMIN SERPL BCP-MCNC: 3.3 G/DL (ref 3.4–5)
ALBUMIN/GLOB SERPL: 0.8 {RATIO} (ref 0.8–1.7)
ALP SERPL-CCNC: 115 U/L (ref 45–117)
ALT SERPL-CCNC: 33 U/L (ref 13–56)
ANION GAP BLD CALC-SCNC: 11 MMOL/L (ref 3–18)
APPEARANCE UR: ABNORMAL
AST SERPL W P-5'-P-CCNC: 45 U/L (ref 15–37)
BACTERIA URNS QL MICRO: ABNORMAL /HPF
BASOPHILS # BLD AUTO: 0 K/UL (ref 0–0.06)
BASOPHILS # BLD: 0 % (ref 0–2)
BILIRUB SERPL-MCNC: 1.9 MG/DL (ref 0.2–1)
BILIRUB UR QL: NEGATIVE
BUN SERPL-MCNC: 36 MG/DL (ref 7–18)
BUN/CREAT SERPL: 15 (ref 12–20)
CALCIUM SERPL-MCNC: 9.4 MG/DL (ref 8.5–10.1)
CHLORIDE SERPL-SCNC: 95 MMOL/L (ref 100–108)
CO2 SERPL-SCNC: 28 MMOL/L (ref 21–32)
COLOR UR: YELLOW
CREAT SERPL-MCNC: 2.33 MG/DL (ref 0.6–1.3)
DIFFERENTIAL METHOD BLD: ABNORMAL
EOSINOPHIL # BLD: 0 K/UL (ref 0–0.4)
EOSINOPHIL NFR BLD: 0 % (ref 0–5)
EPITH CASTS URNS QL MICRO: ABNORMAL /LPF (ref 0–5)
ERYTHROCYTE [DISTWIDTH] IN BLOOD BY AUTOMATED COUNT: 14.5 % (ref 11.6–14.5)
FLUAV AG NPH QL IA: NEGATIVE
FLUBV AG NOSE QL IA: NEGATIVE
GLOBULIN SER CALC-MCNC: 4.2 G/DL (ref 2–4)
GLUCOSE SERPL-MCNC: 157 MG/DL (ref 74–99)
GLUCOSE UR STRIP.AUTO-MCNC: NEGATIVE MG/DL
HCT VFR BLD AUTO: 35.1 % (ref 35–45)
HGB BLD-MCNC: 11.9 G/DL (ref 12–16)
HGB UR QL STRIP: ABNORMAL
KETONES UR QL STRIP.AUTO: NEGATIVE MG/DL
LEUKOCYTE ESTERASE UR QL STRIP.AUTO: ABNORMAL
LYMPHOCYTES # BLD AUTO: 4 % (ref 21–52)
LYMPHOCYTES # BLD: 0.4 K/UL (ref 0.9–3.6)
MCH RBC QN AUTO: 27.3 PG (ref 24–34)
MCHC RBC AUTO-ENTMCNC: 33.9 G/DL (ref 31–37)
MCV RBC AUTO: 80.5 FL (ref 74–97)
MONOCYTES # BLD: 0.6 K/UL (ref 0.05–1.2)
MONOCYTES NFR BLD AUTO: 5 % (ref 3–10)
NEUTS SEG # BLD: 10.5 K/UL (ref 1.8–8)
NEUTS SEG NFR BLD AUTO: 91 % (ref 40–73)
NITRITE UR QL STRIP.AUTO: NEGATIVE
PH UR STRIP: 5 [PH] (ref 5–8)
PLATELET # BLD AUTO: 122 K/UL (ref 135–420)
PMV BLD AUTO: 10.8 FL (ref 9.2–11.8)
POTASSIUM SERPL-SCNC: 3.7 MMOL/L (ref 3.5–5.5)
PROT SERPL-MCNC: 7.5 G/DL (ref 6.4–8.2)
PROT UR STRIP-MCNC: 30 MG/DL
RBC # BLD AUTO: 4.36 M/UL (ref 4.2–5.3)
RBC #/AREA URNS HPF: ABNORMAL /HPF (ref 0–5)
SODIUM SERPL-SCNC: 134 MMOL/L (ref 136–145)
SP GR UR REFRACTOMETRY: 1.02 (ref 1–1.03)
UROBILINOGEN UR QL STRIP.AUTO: 1 EU/DL (ref 0.2–1)
WBC # BLD AUTO: 11.6 K/UL (ref 4.6–13.2)
WBC URNS QL MICRO: ABNORMAL /HPF (ref 0–4)

## 2017-03-14 PROCEDURE — 87804 INFLUENZA ASSAY W/OPTIC: CPT | Performed by: EMERGENCY MEDICINE

## 2017-03-14 PROCEDURE — 99283 EMERGENCY DEPT VISIT LOW MDM: CPT

## 2017-03-14 PROCEDURE — 81001 URINALYSIS AUTO W/SCOPE: CPT | Performed by: PHYSICIAN ASSISTANT

## 2017-03-14 PROCEDURE — 85025 COMPLETE CBC W/AUTO DIFF WBC: CPT | Performed by: PHYSICIAN ASSISTANT

## 2017-03-14 PROCEDURE — 87186 SC STD MICRODIL/AGAR DIL: CPT | Performed by: PHYSICIAN ASSISTANT

## 2017-03-14 PROCEDURE — 71020 XR CHEST PA LAT: CPT

## 2017-03-14 PROCEDURE — 74011250636 HC RX REV CODE- 250/636: Performed by: PHYSICIAN ASSISTANT

## 2017-03-14 PROCEDURE — 87086 URINE CULTURE/COLONY COUNT: CPT | Performed by: PHYSICIAN ASSISTANT

## 2017-03-14 PROCEDURE — 87077 CULTURE AEROBIC IDENTIFY: CPT | Performed by: PHYSICIAN ASSISTANT

## 2017-03-14 PROCEDURE — 96361 HYDRATE IV INFUSION ADD-ON: CPT

## 2017-03-14 PROCEDURE — 80053 COMPREHEN METABOLIC PANEL: CPT | Performed by: PHYSICIAN ASSISTANT

## 2017-03-14 PROCEDURE — 74011250637 HC RX REV CODE- 250/637: Performed by: EMERGENCY MEDICINE

## 2017-03-14 PROCEDURE — 96374 THER/PROPH/DIAG INJ IV PUSH: CPT

## 2017-03-14 RX ORDER — MORPHINE SULFATE 2 MG/ML
2 INJECTION, SOLUTION INTRAMUSCULAR; INTRAVENOUS
Status: COMPLETED | OUTPATIENT
Start: 2017-03-14 | End: 2017-03-14

## 2017-03-14 RX ORDER — NITROFURANTOIN 25; 75 MG/1; MG/1
100 CAPSULE ORAL 2 TIMES DAILY
Qty: 14 CAP | Refills: 0 | Status: SHIPPED | OUTPATIENT
Start: 2017-03-14 | End: 2017-03-21

## 2017-03-14 RX ORDER — ACETAMINOPHEN 325 MG/1
975 TABLET ORAL
Status: COMPLETED | OUTPATIENT
Start: 2017-03-14 | End: 2017-03-14

## 2017-03-14 RX ORDER — IBUPROFEN 400 MG/1
800 TABLET ORAL
Status: DISCONTINUED | OUTPATIENT
Start: 2017-03-14 | End: 2017-03-14

## 2017-03-14 RX ADMIN — ACETAMINOPHEN 975 MG: 325 TABLET, FILM COATED ORAL at 14:10

## 2017-03-14 RX ADMIN — Medication 2 MG: at 16:15

## 2017-03-14 RX ADMIN — SODIUM CHLORIDE 1000 ML: 900 INJECTION, SOLUTION INTRAVENOUS at 14:20

## 2017-03-14 NOTE — ED PROVIDER NOTES
HPI Comments: Pt is a 73yo female presenting to ED with fever and shaking chills since yesterday afternoon. Pt states she was seen at ortho office yesterday morning for hyuronic acid injections in bilateral knees and steroids in hands. Pt states she has had both injections multiple times in past without reaction or problem. Pt reports shaking and fever started shortly after returning home from office visit. Denies headaches, dizziness, cough, nasal congestion, ENT symptoms, CP, SOB, neck pain, abdominal pain, NVD, urinary symptoms or any other symptoms at this time. Does report lower back pain since waking up which has worsened since starting. Has not taken anything for pain or fever today. Past Medical History:  9/5/2012: Acute venous embolism and thrombosis of deep v*  9/5/2012: Anemia in chronic kidney disease(285.21)  9/5/2012: Anticoagulated on Coumadin  4/28/05: Biventricular implantable cardiac defibrillato*      Comment: upgrade to BiV AICD, gen change 4/08, pocket                revision 10/09  08/15/1996: Cardiac cath      Comment: Patent coronaries. Elev LVEDP. EF 50-55%. 06/23/2015: Cardiac echocardiogram      Comment: Ltd study. EF 45-50%. Mild, diffuse hypk. Severe apical hypk. No mass or thrombus was                clearly identified, although imaging was                suboptimal.    06/19/2015: Cardiac nuclear imaging test      Comment: Fixed distal apical, distal septal defect more               likely due to RV pacing than prior infarct. No               ischemia. EF 46%. RWMA c/w RV pacing. Nondiagnostic EKG on pharm stress test.    09/04/2012: Cardiovascular lower extremity venous duplex      Comment: Acute, non-occlusive DVT in CFV on right. No                DVT on left. No superficial thrombosis                bilaterally. 08/27/2012: Cardiovascular upper extremity venous duplex      Comment: DVT in axillary vein on left.   Left subclavian was not visualized. No date: CHF (congestive heart failure) ([facility] [address]  9/5/2012: Chronic kidney disease, stage IV (severe) (HCC)  No date: Congestive heart failure, unspecified  No date: Diabetes ([address]  6/28/2011: Diabetic peripheral neuropathy associated with*  08/22/2012: Difficult airway for intubation      Comment: see anesthesia airway note  9/5/2012: Generalized weakness  No date: Hypercholesterolemia  No date: Hypertension  No date: LBBB (left bundle branch block)  No date: Nonischemic cardiomyopathy  3/13/2017: Obesity (BMI 35.0-39.9 without comorbidity) (H*  12/04: Pacemaker      Comment: status post DDD permanent pacemaker  No date: Peripheral neuropathy ([facility] [address]      Comment: secondary due to diabetes  9/5/2012: Postoperative anemia due to acute blood loss  6/28/2011: Type II or unspecified type diabetes mellitus *  9/5/2012: Unspecified essential hypertension     PCP: Rosalie Loza MD       Patient is a 77 y.o. female presenting with chills and fever. The history is provided by the patient and the spouse. Chills      Fever           Past Medical History:   Diagnosis Date    Acute venous embolism and thrombosis of deep veins of upper extremity ([facility] [address] 9/5/2012    Anemia in chronic kidney disease(285.21) 9/5/2012    Anticoagulated on Coumadin 9/5/2012    Biventricular implantable cardiac defibrillator in situ 4/28/05    upgrade to BiV AICD, gen change 4/08, pocket revision 10/09    Cardiac cath 08/15/1996    Patent coronaries. Elev LVEDP. EF 50-55%.  Cardiac echocardiogram 06/23/2015    Ltd study. EF 45-50%. Mild, diffuse hypk. Severe apical hypk. No mass or thrombus was clearly identified, although imaging was suboptimal.      Cardiac nuclear imaging test 06/19/2015    Fixed distal apical, distal septal defect more likely due to RV pacing than prior infarct. No ischemia. EF 46%. RWMA c/w RV pacing.   Nondiagnostic EKG on pharm stress test.      Cardiovascular lower extremity venous duplex 09/04/2012    Acute, non-occlusive DVT in CFV on right. No DVT on left. No superficial thrombosis bilaterally.  Cardiovascular upper extremity venous duplex 08/27/2012    DVT in axillary vein on left. Left subclavian was not visualized.  CHF (congestive heart failure) (HCC)     Chronic kidney disease, stage IV (severe) ([facility] [address] 9/5/2012    Congestive heart failure, unspecified     Diabetes ([facility] [address]     Diabetic peripheral neuropathy associated with type 2 diabetes mellitus ([facility] [address] 6/28/2011    Difficult airway for intubation 08/22/2012    see anesthesia airway note    Generalized weakness 9/5/2012    Hypercholesterolemia     Hypertension     LBBB (left bundle branch block)     Nonischemic cardiomyopathy     Obesity (BMI 35.0-39.9 without comorbidity) ([facility] [address] 3/13/2017    Pacemaker 12/04    status post DDD permanent pacemaker    Peripheral neuropathy ([facility] [address]     secondary due to diabetes    Postoperative anemia due to acute blood loss 9/5/2012    Type II or unspecified type diabetes mellitus with neurological manifestations, not stated as uncontrolled 6/28/2011    Unspecified essential hypertension 9/5/2012       Past Surgical History:   Procedure Laterality Date    HX CARPAL TUNNEL RELEASE  4/07    right     HX CHOLECYSTECTOMY  1994    HX HYSTERECTOMY  1973    HX OTHER SURGICAL  6/11/2012    AICD revision    HX PACEMAKER  4/28/2005    [employer] AICD         Family History:   Problem Relation Age of Onset    Cancer Father        Social History     Social History    Marital status:      Spouse name: N/A    Number of children: N/A    Years of education: N/A     Occupational History    Not on file.      Social History Main Topics    Smoking status: Never Smoker    Smokeless tobacco: Never Used    Alcohol use No    Drug use: No    Sexual activity: Yes     Partners: Male     Other Topics Concern    Not on file     Social History Narrative         ALLERGIES: Vancomycin; Ampicillin; Bactrim [sulfamethoxazole-trimethoprim]; Ciprofloxacin; Codeine; Crestor [rosuvastatin]; Darvocet a500 [propoxyphene n-acetaminophen]; Demerol [meperidine]; Levaquin [levofloxacin]; Lipitor [atorvastatin]; Magnesium oxide; Minocin [minocycline]; Pcn [penicillins]; Pravachol [pravastatin]; Sulfa (sulfonamide antibiotics); Ultracet [tramadol-acetaminophen]; Vicodin [hydrocodone-acetaminophen]; Vytorin 10-10 [ezetimibe-simvastatin]; and Percodan [oxycodone hcl-oxycodone-asa]    Review of Systems   Constitutional: Positive for chills and fever. Vitals:    03/14/17 1401   BP: 145/71   Resp: 24   Temp: (!) 102.5 °F (39.2 °C)   SpO2: 97%   Weight: 110.2 kg (243 lb)   Height: 5' 7\" (1.702 m)            Physical Exam   Constitutional: She is oriented to person, place, and time. Vital signs are normal. She appears well-developed and well-nourished. She is active and cooperative. Non-toxic appearance. She does not have a sickly appearance. No distress. Generalized body shakes, which resolved after tylenol and IVF   HENT:   Head: Normocephalic and atraumatic. Right Ear: External ear normal.   Left Ear: External ear normal.   Nose: Nose normal.   Mouth/Throat: Oropharynx is clear and moist. No oropharyngeal exudate. No swelling to lips, throat or tongue   Eyes: Pupils are equal, round, and reactive to light. Neck: Normal range of motion. Neck supple. Cardiovascular: Normal rate, regular rhythm, normal heart sounds and intact distal pulses. No murmur heard. Pulmonary/Chest: Effort normal and breath sounds normal. No stridor. No respiratory distress. She has no wheezes. She has no rales. She exhibits no tenderness. No cough   Abdominal: Soft. Bowel sounds are normal. She exhibits no distension and no mass. There is no tenderness. There is no rebound and no guarding. +right CVAT   Musculoskeletal: Normal range of motion. Neurological: She is alert and oriented to person, place, and time.  She has normal reflexes. She displays normal reflexes. No cranial nerve deficit. She exhibits normal muscle tone. Coordination normal.   Skin: Skin is warm. No rash noted. She is not diaphoretic. Psychiatric: She has a normal mood and affect. Nursing note and vitals reviewed. MDM  Number of Diagnoses or Management Options  Pyelonephritis:   Diagnosis management comments: 71yo female presenting to ED with fever and shaking chills x1 day, right lower back pain since this morning. Recent injections with steroids and hyuronic acid yesterday, has had both in past without reactions or problems. Pe show shaking chills, fever, and RT CVAT, otherwise unremarkable. Well hydrated and non-toxic appearing. Will obtain ua, CXR, flu, CBC, CMP. IVF and tylenol given. WIll re-eval and monitor. 3:45 PM  Shaking resolved. Pt requesting pain meds for back pain. Will give morphine. Fever 101.9. Will continue to monitor. Labs Reviewed  CBC WITH AUTOMATED DIFF - Abnormal; Notable for the following:      HGB                           11.9 (*)               PLATELET                      122 (*)                NEUTROPHILS                   91 (*)                 LYMPHOCYTES                   4 (*)                  ABS. NEUTROPHILS              10.5 (*)               ABS.  LYMPHOCYTES              0.4 (*)             All other components within normal limits  METABOLIC PANEL, COMPREHENSIVE - Abnormal; Notable for the following:      Sodium                        134 (*)                Chloride                      95 (*)                 Glucose                       157 (*)                BUN                           36 (*)                 Creatinine                    2.33 (*)               GFR est AA                    25 (*)                 GFR est non-AA                21 (*)                 Bilirubin, total              1.9 (*)                AST (SGOT)                    45 (*)                 Albumin 3.3 (*)                Globulin                      4.2 (*)             All other components within normal limits  URINALYSIS W/ RFLX MICROSCOPIC - Abnormal; Notable for the following:      Protein                       30 (*)                 Blood                         MODERATE (*)               Leukocyte Esterase            MODERATE (*)            All other components within normal limits  URINE MICROSCOPIC ONLY - Abnormal; Notable for the following:      Bacteria                      4+ (*)              All other components within normal limits  INFLUENZA A & B AG (RAPID TEST)  CULTURE, URINE    ua- moderate leuks, 21-35 WBC  Cbc, cmp WNL. CXR- negative for pneumonia or acute processes  Influenza negative    5:16 PM   Rechecked the patient and updated her on all current results. Vitals normal per ED nurse. Pt improved in pain. No new symptoms. Based upon the patients presentation with noted HPI and PE, along with the work up done in the emergency department today, I believe the patient's symptoms are a result of pyelonephritis. Discussed case with Dr. Larisa Salguero who reviewed patient allergies and agrees with treating with doxy as outpatient and PCP f/u. Discussed proper way to take medications. Discussed treatment plan, return precautions, symptomatic relief, and expected time to improvement. All questions answered. Patient is stable for discharge and outpatient management.    Barbie Michelle PA-C 5:19 PM            Amount and/or Complexity of Data Reviewed  Clinical lab tests: ordered and reviewed  Tests in the radiology section of CPT®: ordered and reviewed      ED Course       Procedures

## 2017-03-14 NOTE — ED NOTES
Pt advised of need for clean catch urine specimen. States unable to void at present.  states pt has been drinking \"a little bit of water\" the past couple of days.

## 2017-03-14 NOTE — ED NOTES
Verbal shift change report given to Boby Lang RN  (oncoming nurse) by He Gray RN (offgoing nurse). Report included the following information SBAR and MAR.

## 2017-03-14 NOTE — ED NOTES
Pt presents to ER c/o onset of fever yesterday am.  States she had a fever while at her ortho appt yesterday. States having right lower back pain as well. Denies cough, sore throat, headache or other cold symptoms. Pt's  states pt had injections to bilateral knees and hands yesterday at ortho.

## 2017-03-14 NOTE — DISCHARGE INSTRUCTIONS
Kidney Infection: Care Instructions  Your Care Instructions  A kidney infection (pyelonephritis) is a type of urinary tract infection, or UTI. Most UTIs are bladder infections. Kidney infections tend to make people much sicker than bladder infections do. A kidney infection is also more serious because it can cause lasting damage if it is not treated quickly. Follow-up care is a key part of your treatment and safety. Be sure to make and go to all appointments, and call your doctor if you are having problems. Its also a good idea to know your test results and keep a list of the medicines you take. How can you care for yourself at home? · Take your antibiotics as directed. Do not stop taking them just because you feel better. You need to take the full course of antibiotics. · Drink plenty of water, enough so that your urine is light yellow or clear like water. This may help wash out bacteria that are causing the infection. If you have kidney, heart, or liver disease and have to limit fluids, talk with your doctor before you increase the amount of fluids you drink. · Urinate often. Try to empty your bladder each time. · To relieve pain, take a hot shower or lay a heating pad (set on low) over your lower belly. Never go to sleep with a heating pad in place. Put a thin cloth between the heating pad and your skin. To help prevent kidney infections  · Drink plenty of water each day. This helps you urinate often, which clears bacteria from your system. If you have kidney, heart, or liver disease and have to limit fluids, talk with your doctor before you increase the amount of fluids you drink. · Include cranberry juice in your diet. · Urinate when you have the urge. Do not hold your urine for a long time. Urinate before you go to sleep. · If you have symptoms of a bladder infection, such as burning when you urinate or having to urinate often, call your doctor so you can treat the problem before it gets worse. If you do not treat a bladder infection quickly, it can spread to the kidney. · Men should keep the tip of the penis clean. If you are a woman, keep these ideas in mind:  · Urinate right after you have sex. · Change sanitary pads often. Avoid douches, feminine hygiene sprays, and other feminine hygiene products that have deodorants. · After going to the bathroom, wipe from front to back. When should you call for help? Call your doctor now or seek immediate medical care if:  · You have increasing pain in your back just below the rib cage. This is called flank pain. · You have a new or higher fever and chills. · You are vomiting or nauseated. Watch closely for changes in your health, and be sure to contact your doctor if:  · Symptoms, such as burning when you urinate, get worse or get better but then come back. · You are not getting better after 2 days. Where can you learn more? Go to http://aren-mirella.info/. Enter D789 in the search box to learn more about \"Kidney Infection: Care Instructions. \"  Current as of: November 20, 2015  Content Version: 11.1  © 4303-9109 [employer]. Care instructions adapted under license by [employer] (which disclaims liability or warranty for this information). If you have questions about a medical condition or this instruction, always ask your healthcare professional. [place] [address] any warranty or liability for your use of this information.

## 2017-03-14 NOTE — ED TRIAGE NOTES
Patient states receiving cortisone injections to bilateral hands and hyaluronic acid injection to bilateral knees yesterday. States onset of symptoms following injections. C/o chills and fever. Denies cough.

## 2017-03-16 ENCOUNTER — APPOINTMENT (OUTPATIENT)
Dept: CT IMAGING | Age: 67
End: 2017-03-16
Attending: EMERGENCY MEDICINE
Payer: COMMERCIAL

## 2017-03-16 ENCOUNTER — HOSPITAL ENCOUNTER (EMERGENCY)
Age: 67
Discharge: HOME | End: 2017-03-16
Attending: EMERGENCY MEDICINE
Payer: COMMERCIAL

## 2017-03-16 VITALS
HEART RATE: 90 BPM | TEMPERATURE: 98.2 F | DIASTOLIC BLOOD PRESSURE: 65 MMHG | RESPIRATION RATE: 22 BRPM | SYSTOLIC BLOOD PRESSURE: 142 MMHG | WEIGHT: 240 LBS | OXYGEN SATURATION: 97 % | BODY MASS INDEX: 37.59 KG/M2

## 2017-03-16 DIAGNOSIS — R11.2 NON-INTRACTABLE VOMITING WITH NAUSEA, UNSPECIFIED VOMITING TYPE: ICD-10-CM

## 2017-03-16 DIAGNOSIS — N12 PYELONEPHRITIS: Primary | ICD-10-CM

## 2017-03-16 LAB
ANION GAP BLD CALC-SCNC: 7 MMOL/L (ref 3–18)
APPEARANCE UR: CLEAR
BACTERIA SPEC CULT: ABNORMAL
BACTERIA URNS QL MICRO: ABNORMAL /HPF
BASOPHILS # BLD AUTO: 0 K/UL (ref 0–0.1)
BASOPHILS # BLD: 0 % (ref 0–2)
BILIRUB UR QL: NEGATIVE
BUN SERPL-MCNC: 31 MG/DL (ref 7–18)
BUN/CREAT SERPL: 20 (ref 12–20)
CALCIUM SERPL-MCNC: 9.4 MG/DL (ref 8.5–10.1)
CHLORIDE SERPL-SCNC: 97 MMOL/L (ref 100–108)
CO2 SERPL-SCNC: 30 MMOL/L (ref 21–32)
COLOR UR: YELLOW
CREAT SERPL-MCNC: 1.57 MG/DL (ref 0.6–1.3)
DIFFERENTIAL METHOD BLD: ABNORMAL
EOSINOPHIL # BLD: 0 K/UL (ref 0–0.4)
EOSINOPHIL NFR BLD: 0 % (ref 0–5)
EPITH CASTS URNS QL MICRO: ABNORMAL /LPF (ref 0–5)
ERYTHROCYTE [DISTWIDTH] IN BLOOD BY AUTOMATED COUNT: 14.4 % (ref 11.6–14.5)
GLUCOSE SERPL-MCNC: 202 MG/DL (ref 74–99)
GLUCOSE UR STRIP.AUTO-MCNC: NEGATIVE MG/DL
HCT VFR BLD AUTO: 33.7 % (ref 35–45)
HGB BLD-MCNC: 11.8 G/DL (ref 12–16)
HGB UR QL STRIP: ABNORMAL
INR PPP: 1.4 (ref 0.8–1.2)
KETONES UR QL STRIP.AUTO: 15 MG/DL
LEUKOCYTE ESTERASE UR QL STRIP.AUTO: NEGATIVE
LYMPHOCYTES # BLD AUTO: 6 % (ref 21–52)
LYMPHOCYTES # BLD: 0.7 K/UL (ref 0.9–3.6)
MCH RBC QN AUTO: 27.7 PG (ref 24–34)
MCHC RBC AUTO-ENTMCNC: 35 G/DL (ref 31–37)
MCV RBC AUTO: 79.1 FL (ref 74–97)
MONOCYTES # BLD: 1.1 K/UL (ref 0.05–1.2)
MONOCYTES NFR BLD AUTO: 9 % (ref 3–10)
NEUTS SEG # BLD: 10.4 K/UL (ref 1.8–8)
NEUTS SEG NFR BLD AUTO: 85 % (ref 40–73)
NITRITE UR QL STRIP.AUTO: NEGATIVE
PH UR STRIP: 6 [PH] (ref 5–8)
PLATELET # BLD AUTO: 115 K/UL (ref 135–420)
PMV BLD AUTO: 10.9 FL (ref 9.2–11.8)
POTASSIUM SERPL-SCNC: 3.6 MMOL/L (ref 3.5–5.5)
PROT UR STRIP-MCNC: 30 MG/DL
PROTHROMBIN TIME: 16.9 SEC (ref 11.5–15.2)
RBC # BLD AUTO: 4.26 M/UL (ref 4.2–5.3)
RBC #/AREA URNS HPF: ABNORMAL /HPF (ref 0–5)
SERVICE CMNT-IMP: ABNORMAL
SODIUM SERPL-SCNC: 134 MMOL/L (ref 136–145)
SP GR UR REFRACTOMETRY: 1.01 (ref 1–1.03)
UROBILINOGEN UR QL STRIP.AUTO: 1 EU/DL (ref 0.2–1)
WBC # BLD AUTO: 12.2 K/UL (ref 4.6–13.2)
WBC URNS QL MICRO: ABNORMAL /HPF (ref 0–4)

## 2017-03-16 PROCEDURE — 74176 CT ABD & PELVIS W/O CONTRAST: CPT

## 2017-03-16 PROCEDURE — 80048 BASIC METABOLIC PNL TOTAL CA: CPT | Performed by: EMERGENCY MEDICINE

## 2017-03-16 PROCEDURE — 96375 TX/PRO/DX INJ NEW DRUG ADDON: CPT

## 2017-03-16 PROCEDURE — 99283 EMERGENCY DEPT VISIT LOW MDM: CPT

## 2017-03-16 PROCEDURE — 85025 COMPLETE CBC W/AUTO DIFF WBC: CPT | Performed by: EMERGENCY MEDICINE

## 2017-03-16 PROCEDURE — 74011250637 HC RX REV CODE- 250/637: Performed by: EMERGENCY MEDICINE

## 2017-03-16 PROCEDURE — 81001 URINALYSIS AUTO W/SCOPE: CPT | Performed by: EMERGENCY MEDICINE

## 2017-03-16 PROCEDURE — 74011250636 HC RX REV CODE- 250/636: Performed by: EMERGENCY MEDICINE

## 2017-03-16 PROCEDURE — 96374 THER/PROPH/DIAG INJ IV PUSH: CPT

## 2017-03-16 PROCEDURE — 96361 HYDRATE IV INFUSION ADD-ON: CPT

## 2017-03-16 PROCEDURE — 85610 PROTHROMBIN TIME: CPT | Performed by: EMERGENCY MEDICINE

## 2017-03-16 RX ORDER — OXYCODONE AND ACETAMINOPHEN 5; 325 MG/1; MG/1
1 TABLET ORAL
Qty: 12 TAB | Refills: 0 | Status: SHIPPED | OUTPATIENT
Start: 2017-03-16 | End: 2017-04-06

## 2017-03-16 RX ORDER — ONDANSETRON 4 MG/1
4 TABLET, ORALLY DISINTEGRATING ORAL
Qty: 15 TAB | Refills: 0 | Status: SHIPPED | OUTPATIENT
Start: 2017-03-16 | End: 2017-04-06

## 2017-03-16 RX ORDER — OXYCODONE AND ACETAMINOPHEN 5; 325 MG/1; MG/1
1 TABLET ORAL
Status: COMPLETED | OUTPATIENT
Start: 2017-03-16 | End: 2017-03-16

## 2017-03-16 RX ORDER — CIPROFLOXACIN 500 MG/1
500 TABLET ORAL 2 TIMES DAILY
Qty: 20 TAB | Refills: 0 | Status: SHIPPED | OUTPATIENT
Start: 2017-03-16 | End: 2017-03-26

## 2017-03-16 RX ORDER — CIPROFLOXACIN 500 MG/1
500 TABLET ORAL
Status: COMPLETED | OUTPATIENT
Start: 2017-03-16 | End: 2017-03-16

## 2017-03-16 RX ORDER — ONDANSETRON 2 MG/ML
4 INJECTION INTRAMUSCULAR; INTRAVENOUS
Status: COMPLETED | OUTPATIENT
Start: 2017-03-16 | End: 2017-03-16

## 2017-03-16 RX ORDER — KETOROLAC TROMETHAMINE 30 MG/ML
15 INJECTION, SOLUTION INTRAMUSCULAR; INTRAVENOUS
Status: COMPLETED | OUTPATIENT
Start: 2017-03-16 | End: 2017-03-16

## 2017-03-16 RX ADMIN — OXYCODONE HYDROCHLORIDE AND ACETAMINOPHEN 1 TABLET: 5; 325 TABLET ORAL at 14:28

## 2017-03-16 RX ADMIN — SODIUM CHLORIDE 1000 ML: 900 INJECTION, SOLUTION INTRAVENOUS at 11:07

## 2017-03-16 RX ADMIN — ONDANSETRON 4 MG: 2 INJECTION INTRAMUSCULAR; INTRAVENOUS at 11:07

## 2017-03-16 RX ADMIN — CIPROFLOXACIN HYDROCHLORIDE 500 MG: 500 TABLET, FILM COATED ORAL at 14:28

## 2017-03-16 RX ADMIN — KETOROLAC TROMETHAMINE 15 MG: 30 INJECTION, SOLUTION INTRAMUSCULAR at 11:07

## 2017-03-16 NOTE — DISCHARGE INSTRUCTIONS
Nausea and Vomiting: Care Instructions  Your Care Instructions    When you are nauseated, you may feel weak and sweaty and notice a lot of saliva in your mouth. Nausea often leads to vomiting. Most of the time you do not need to worry about nausea and vomiting, but they can be signs of other illnesses. Two common causes of nausea and vomiting are stomach flu and food poisoning. Nausea and vomiting from viral stomach flu will usually start to improve within 24 hours. Nausea and vomiting from food poisoning may last from 12 to 48 hours. The doctor has checked you carefully, but problems can develop later. If you notice any problems or new symptoms, get medical treatment right away. Follow-up care is a key part of your treatment and safety. Be sure to make and go to all appointments, and call your doctor if you are having problems. It's also a good idea to know your test results and keep a list of the medicines you take. How can you care for yourself at home? · To prevent dehydration, drink plenty of fluids, enough so that your urine is light yellow or clear like water. Choose water and other caffeine-free clear liquids until you feel better. If you have kidney, heart, or liver disease and have to limit fluids, talk with your doctor before you increase the amount of fluids you drink. · Rest in bed until you feel better. · When you are able to eat, try clear soups, mild foods, and liquids until all symptoms are gone for 12 to 48 hours. Other good choices include dry toast, crackers, cooked cereal, and gelatin dessert, such as Jell-O. When should you call for help? Call 911 anytime you think you may need emergency care. For example, call if:  · You passed out (lost consciousness). Call your doctor now or seek immediate medical care if:  · You have symptoms of dehydration, such as:  ¨ Dry eyes and a dry mouth. ¨ Passing only a little dark urine.   ¨ Feeling thirstier than usual.  · You have new or worsening belly pain. · You have a new or higher fever. · You vomit blood or what looks like coffee grounds. Watch closely for changes in your health, and be sure to contact your doctor if:  · You have ongoing nausea and vomiting. · Your vomiting is getting worse. · Your vomiting lasts longer than 2 days. · You are not getting better as expected. Where can you learn more? Go to http://aren-mirella.info/. Enter 25 081415 in the search box to learn more about \"Nausea and Vomiting: Care Instructions. \"  Current as of: May 27, 2016  Content Version: 11.1  © 8072-4494 [employer]. Care instructions adapted under license by [employer] (which disclaims liability or warranty for this information). If you have questions about a medical condition or this instruction, always ask your healthcare professional. [address] any warranty or liability for your use of this information. Kidney Infection: Care Instructions  Your Care Instructions  A kidney infection (pyelonephritis) is a type of urinary tract infection, or UTI. Most UTIs are bladder infections. Kidney infections tend to make people much sicker than bladder infections do. A kidney infection is also more serious because it can cause lasting damage if it is not treated quickly. Follow-up care is a key part of your treatment and safety. Be sure to make and go to all appointments, and call your doctor if you are having problems. Its also a good idea to know your test results and keep a list of the medicines you take. How can you care for yourself at home? · Take your antibiotics as directed. Do not stop taking them just because you feel better. You need to take the full course of antibiotics. · Drink plenty of water, enough so that your urine is light yellow or clear like water. This may help wash out bacteria that are causing the infection.  If you have kidney, heart, or liver disease and have to limit fluids, talk with your doctor before you increase the amount of fluids you drink. · Urinate often. Try to empty your bladder each time. · To relieve pain, take a hot shower or lay a heating pad (set on low) over your lower belly. Never go to sleep with a heating pad in place. Put a thin cloth between the heating pad and your skin. To help prevent kidney infections  · Drink plenty of water each day. This helps you urinate often, which clears bacteria from your system. If you have kidney, heart, or liver disease and have to limit fluids, talk with your doctor before you increase the amount of fluids you drink. · Include cranberry juice in your diet. · Urinate when you have the urge. Do not hold your urine for a long time. Urinate before you go to sleep. · If you have symptoms of a bladder infection, such as burning when you urinate or having to urinate often, call your doctor so you can treat the problem before it gets worse. If you do not treat a bladder infection quickly, it can spread to the kidney. · Men should keep the tip of the penis clean. If you are a woman, keep these ideas in mind:  · Urinate right after you have sex. · Change sanitary pads often. Avoid douches, feminine hygiene sprays, and other feminine hygiene products that have deodorants. · After going to the bathroom, wipe from front to back. When should you call for help? Call your doctor now or seek immediate medical care if:  · You have increasing pain in your back just below the rib cage. This is called flank pain. · You have a new or higher fever and chills. · You are vomiting or nauseated. Watch closely for changes in your health, and be sure to contact your doctor if:  · Symptoms, such as burning when you urinate, get worse or get better but then come back. · You are not getting better after 2 days. Where can you learn more? Go to http://aren-mirella.info/.   Enter O640 in the search box to learn more about \"Kidney Infection: Care Instructions. \"  Current as of: November 20, 2015  Content Version: 11.1  © 2987-8802 [employer]. Care instructions adapted under license by [employer] (which disclaims liability or warranty for this information). If you have questions about a medical condition or this instruction, always ask your healthcare professional. [address] any warranty or liability for your use of this information.

## 2017-03-16 NOTE — ED TRIAGE NOTES
\"Two days ago I was treated for a kidney infection and yesterday it seem like it was getting worst. Today the pain is so severe I can't bare it. \"

## 2017-03-16 NOTE — ED PROVIDER NOTES
HPI Comments: Patient with a history of CAD, AICD placement, upper extremity DVT currently on Coumadin of recently diagnosed pyelonephritis presents with persistent RIGHT flank pain. She had been started on Macrobid, complaint compliant with all her medications. Symptoms started on Monday included right-sided flank pain, nausea without vomiting no diarrhea denies any chest pain or shortness of breath. Labs reviewed from [facility], evidently she was febrile white count negative creatinine 2.33 which is slightly worse compared to her baseline 1.5-2      Patient is a 77 y.o. female presenting with flank pain. Flank Pain    Pertinent negatives include no chest pain, no fever, no headaches and no dysuria. Past Medical History:   Diagnosis Date    Acute venous embolism and thrombosis of deep veins of upper extremity ([address] 9/5/2012    Anemia in chronic kidney disease(285.21) 9/5/2012    Anticoagulated on Coumadin 9/5/2012    Biventricular implantable cardiac defibrillator in situ 4/28/05    upgrade to BiV AICD, gen change 4/08, pocket revision 10/09    Cardiac cath 08/15/1996    Patent coronaries. Elev LVEDP. EF 50-55%.  Cardiac echocardiogram 06/23/2015    Ltd study. EF 45-50%. Mild, diffuse hypk. Severe apical hypk. No mass or thrombus was clearly identified, although imaging was suboptimal.      Cardiac nuclear imaging test 06/19/2015    Fixed distal apical, distal septal defect more likely due to RV pacing than prior infarct. No ischemia. EF 46%. RWMA c/w RV pacing. Nondiagnostic EKG on pharm stress test.      Cardiovascular lower extremity venous duplex 09/04/2012    Acute, non-occlusive DVT in CFV on right. No DVT on left. No superficial thrombosis bilaterally.  Cardiovascular upper extremity venous duplex 08/27/2012    DVT in axillary vein on left. Left subclavian was not visualized.     CHF (congestive heart failure) (HCC)     Chronic kidney disease, stage IV (severe) ([facility] 9/5/2012    Congestive heart failure, unspecified     Diabetes ([facility]     Diabetic peripheral neuropathy associated with type 2 diabetes mellitus ([facility] 6/28/2011    Difficult airway for intubation 08/22/2012    see anesthesia airway note    Generalized weakness 9/5/2012    Hypercholesterolemia     Hypertension     LBBB (left bundle branch block)     Nonischemic cardiomyopathy     Obesity (BMI 35.0-39.9 without comorbidity) ([facility] 3/13/2017    Pacemaker 12/04    status post DDD permanent pacemaker    Peripheral neuropathy ([facility]     secondary due to diabetes    Postoperative anemia due to acute blood loss 9/5/2012    Type II or unspecified type diabetes mellitus with neurological manifestations, not stated as uncontrolled 6/28/2011    Unspecified essential hypertension 9/5/2012       Past Surgical History:   Procedure Laterality Date    HX CARPAL TUNNEL RELEASE  4/07    right     HX CHOLECYSTECTOMY  1994    HX HYSTERECTOMY  1973    HX OTHER SURGICAL  6/11/2012    AICD revision    HX PACEMAKER  4/28/2005    [employer] AICD         Family History:   Problem Relation Age of Onset    Cancer Father        Social History     Social History    Marital status:      Spouse name: N/A    Number of children: N/A    Years of education: N/A     Occupational History    Not on file. Social History Main Topics    Smoking status: Never Smoker    Smokeless tobacco: Never Used    Alcohol use No    Drug use: No    Sexual activity: Yes     Partners: Male     Other Topics Concern    Not on file     Social History Narrative         ALLERGIES: Vancomycin; Ampicillin; Bactrim [sulfamethoxazole-trimethoprim]; Ciprofloxacin; Codeine; Crestor [rosuvastatin]; Darvocet a500 [propoxyphene n-acetaminophen]; Demerol [meperidine]; Levaquin [levofloxacin]; Lipitor [atorvastatin]; Magnesium oxide; Minocin [minocycline]; Pcn [penicillins]; Pravachol [pravastatin]; Sulfa (sulfonamide antibiotics);  Ultracet [tramadol-acetaminophen]; Vicodin [hydrocodone-acetaminophen]; Vytorin 10-10 [ezetimibe-simvastatin]; and Percodan [oxycodone hcl-oxycodone-asa]    Review of Systems   Constitutional: Negative for fever. HENT: Negative for nosebleeds. Eyes: Negative for visual disturbance. Respiratory: Negative for shortness of breath. Cardiovascular: Negative for chest pain. Gastrointestinal: Negative for blood in stool. Genitourinary: Positive for flank pain and hematuria. Negative for dysuria. Musculoskeletal: Negative for myalgias. Skin: Negative for rash. Neurological: Negative for headaches. All other systems reviewed and are negative. Vitals:    03/16/17 1245 03/16/17 1300 03/16/17 1315 03/16/17 1330   BP: 144/66 147/61 148/59 142/65   Pulse: 90 85 87 90   Resp: 18 18 18 22   Temp:       SpO2: 100% 98% 97% 97%   Weight:                Physical Exam   Constitutional:   General:  Well-developed, well-nourished, uncomfortable appearing nontoxic nondiaphoretic not warm to touch. Head:  Normocephalic atraumatic. Eyes:  Pupils midrange extraocular movements intact. No pallor or conjunctival injection. Nose:  No rhinorrhea, inspection grossly normal.    Ears:  Grossly normal to inspection, no discharge. Mouth:  Mucous membranes moist, no appreciable intraoral lesion. Neck:  Trachea midline, no asymmetry. No JVD  Chest:  Grossly normal inspection, symmetric chest rise. Pulmonary:  Clear to auscultation bilaterally no wheezes rhonchi or rales. Cardiovascular:  S1-S2 no murmurs rubs or gallops. Abdomen: Soft, nontender, nondistended no guarding rebound or peritoneal signs. LEFT upper abdomen device. Right-sided CVA tenderness  Extremities:  Grossly normal to inspection, peripheral pulses intact in all 4 extremities. Trace bilateral pitting edema  Neurologic:  Alert and oriented no appreciable focal neurologic deficit. Psychiatric:  Grossly normal mood and affect.     Nursing note reviewed, vital signs reviewed. MDM  Number of Diagnoses or Management Options  Non-intractable vomiting with nausea, unspecified vomiting type:   Pyelonephritis:   Diagnosis management comments: ED course:  Patient presents with right-sided flank pain, previously diagnosed pyelonephritis, urine culture on 3/14 greater than 100,000 units of Enterobacter susceptible to [address] , will plan to CT to evaluate for stone treat pain hydrate check labs    Labs unremarkable, CT per radiology no recurrent kidney stone, she did have a nodule/possible drop stone in the RIGHT lower quadrant which is not anywhere close to her pain, she was informed about this as well need close follow-up by PCP. I did review the \"allergies\" , she reports that she is not allergic to ciprofloxacin she has taken oral narcotics in the past, has taken Percocet was just told that she needs to take Benadryl if she develops any itching. Think it's reasonable to discharge her with ciprofloxacin, Percocet for pain and follow up with PCP or return here with any concerns    Patient's presentation, history, physical exam and laboratory evaluations were reviewed. At this time patient was felt to be stable for outpatient management and follow with primary care/specialist.  Patient was instructed to return to the emergency department with any concerns. Disposition:    Discharged home      Portions of this chart were created with Dragon medical speech to text program.   Unrecognized errors may be present.       ED Course       Procedures

## 2017-03-24 ENCOUNTER — TELEPHONE ANTICOAG (OUTPATIENT)
Dept: CARDIOLOGY CLINIC | Age: 67
End: 2017-03-24

## 2017-03-24 LAB — INR, EXTERNAL: 5.4

## 2017-03-24 NOTE — PROGRESS NOTES
Verbal order and read back per Arron Ford, ROSSI  The INR is above the therapeutic range. Ask the patient about bleeding complications. Please make the following adjustments to Coumadin dosing: Hold X 2, 3 mg X 1 then recheck INR   Repeat the INR in 3 days.   Patient's daughter informed of instructions, read back and verbalized understanding Da Dover LPN

## 2017-03-30 ENCOUNTER — TELEPHONE ANTICOAG (OUTPATIENT)
Dept: CARDIOLOGY CLINIC | Age: 67
End: 2017-03-30

## 2017-03-30 ENCOUNTER — HOSPITAL ENCOUNTER (INPATIENT)
Age: 67
LOS: 7 days | Discharge: NURSING, REHAB OR LONG-TERM CARE | DRG: 853 | End: 2017-04-06
Attending: EMERGENCY MEDICINE | Admitting: HOSPITALIST
Payer: COMMERCIAL

## 2017-03-30 ENCOUNTER — APPOINTMENT (OUTPATIENT)
Dept: CT IMAGING | Age: 67
DRG: 853 | End: 2017-03-30
Attending: EMERGENCY MEDICINE
Payer: COMMERCIAL

## 2017-03-30 DIAGNOSIS — R73.9 HYPERGLYCEMIA: ICD-10-CM

## 2017-03-30 DIAGNOSIS — S70.11XA HEMATOMA OF RIGHT ILIOPSOAS MUSCLE, INITIAL ENCOUNTER: ICD-10-CM

## 2017-03-30 DIAGNOSIS — N12 PYELONEPHRITIS: Primary | ICD-10-CM

## 2017-03-30 PROBLEM — S30.1XXA PSOAS HEMATOMA, RIGHT, SECONDARY TO ANTICOAGULANT THERAPY: Status: ACTIVE | Noted: 2017-03-30

## 2017-03-30 PROBLEM — S70.10XA ILIOPSOAS MUSCLE HEMATOMA: Status: ACTIVE | Noted: 2017-03-30

## 2017-03-30 PROBLEM — R65.10 SIRS (SYSTEMIC INFLAMMATORY RESPONSE SYNDROME) (HCC): Status: ACTIVE | Noted: 2017-03-30

## 2017-03-30 LAB
ABO + RH BLD: NORMAL
ADMINISTERED INITIALS, ADMINIT: NORMAL
ALBUMIN SERPL BCP-MCNC: 2.6 G/DL (ref 3.4–5)
ALBUMIN/GLOB SERPL: 0.5 {RATIO} (ref 0.8–1.7)
ALP SERPL-CCNC: 130 U/L (ref 45–117)
ALT SERPL-CCNC: 35 U/L (ref 13–56)
ANION GAP BLD CALC-SCNC: 11 MMOL/L (ref 3–18)
APPEARANCE UR: CLEAR
APTT PPP: 47.9 SEC (ref 23–36.4)
AST SERPL W P-5'-P-CCNC: 15 U/L (ref 15–37)
ATRIAL RATE: 76 BPM
BACTERIA URNS QL MICRO: ABNORMAL /HPF
BASOPHILS # BLD AUTO: 0 K/UL (ref 0–0.06)
BASOPHILS # BLD: 0 % (ref 0–3)
BILIRUB SERPL-MCNC: 1.6 MG/DL (ref 0.2–1)
BILIRUB UR QL: NEGATIVE
BUN SERPL-MCNC: 36 MG/DL (ref 7–18)
BUN/CREAT SERPL: 20 (ref 12–20)
CALCIUM SERPL-MCNC: 9.5 MG/DL (ref 8.5–10.1)
CALCULATED P AXIS, ECG09: 53 DEGREES
CALCULATED R AXIS, ECG10: 93 DEGREES
CALCULATED T AXIS, ECG11: 95 DEGREES
CHLORIDE SERPL-SCNC: 91 MMOL/L (ref 100–108)
CK MB CFR SERPL CALC: 2.5 % (ref 0–4)
CK MB SERPL-MCNC: 2 NG/ML (ref 5–25)
CK SERPL-CCNC: 81 U/L (ref 26–192)
CO2 SERPL-SCNC: 28 MMOL/L (ref 21–32)
COLOR UR: YELLOW
CREAT SERPL-MCNC: 1.78 MG/DL (ref 0.6–1.3)
D50 ADMINISTERED, D50ADM: 0 ML
D50 ORDER, D50ORD: 0 ML
DIAGNOSIS, 93000: NORMAL
DIFFERENTIAL METHOD BLD: ABNORMAL
EOSINOPHIL # BLD: 0 K/UL (ref 0–0.4)
EOSINOPHIL NFR BLD: 0 % (ref 0–5)
EPITH CASTS URNS QL MICRO: ABNORMAL /LPF (ref 0–5)
ERYTHROCYTE [DISTWIDTH] IN BLOOD BY AUTOMATED COUNT: 14.8 % (ref 11.6–14.5)
EST. AVERAGE GLUCOSE BLD GHB EST-MCNC: 183 MG/DL
GLOBULIN SER CALC-MCNC: 5.1 G/DL (ref 2–4)
GLUCOSE BLD STRIP.AUTO-MCNC: 183 MG/DL (ref 70–110)
GLUCOSE BLD STRIP.AUTO-MCNC: 233 MG/DL (ref 70–110)
GLUCOSE BLD STRIP.AUTO-MCNC: 315 MG/DL (ref 70–110)
GLUCOSE BLD STRIP.AUTO-MCNC: 329 MG/DL (ref 70–110)
GLUCOSE BLD STRIP.AUTO-MCNC: 381 MG/DL (ref 70–110)
GLUCOSE SERPL-MCNC: 414 MG/DL (ref 74–99)
GLUCOSE UR STRIP.AUTO-MCNC: >1000 MG/DL
GLUCOSE, GLC: 183 MG/DL
GLUCOSE, GLC: 233 MG/DL
GLUCOSE, GLC: 329 MG/DL
GLUCOSE, GLC: 381 MG/DL
HBA1C MFR BLD: 8 % (ref 4.2–5.6)
HCT VFR BLD AUTO: 33 % (ref 35–45)
HGB BLD-MCNC: 11.4 G/DL (ref 12–16)
HGB UR QL STRIP: ABNORMAL
HIGH TARGET, HITG: 180 MG/DL
INR PPP: 4.3 (ref 0.8–1.2)
INSULIN ADMINSTERED, INSADM: 3.5 UNITS/HOUR
INSULIN ADMINSTERED, INSADM: 3.7 UNITS/HOUR
INSULIN ADMINSTERED, INSADM: 6.4 UNITS/HOUR
INSULIN ADMINSTERED, INSADM: 8.1 UNITS/HOUR
INSULIN ORDER, INSORD: 3.5 UNITS/HOUR
INSULIN ORDER, INSORD: 3.7 UNITS/HOUR
INSULIN ORDER, INSORD: 6.4 UNITS/HOUR
INSULIN ORDER, INSORD: 8.1 UNITS/HOUR
KETONES UR QL STRIP.AUTO: ABNORMAL MG/DL
LACTATE BLD-SCNC: 2 MMOL/L (ref 0.4–2)
LEUKOCYTE ESTERASE UR QL STRIP.AUTO: NEGATIVE
LOW TARGET, LOT: 140 MG/DL
LYMPHOCYTES # BLD AUTO: 6 % (ref 20–51)
LYMPHOCYTES # BLD: 1.1 K/UL (ref 0.8–3.5)
MCH RBC QN AUTO: 27 PG (ref 24–34)
MCHC RBC AUTO-ENTMCNC: 34.5 G/DL (ref 31–37)
MCV RBC AUTO: 78 FL (ref 74–97)
METAMYELOCYTES NFR BLD MANUAL: 1 %
MINUTES UNTIL NEXT BG, NBG: 60 MIN
MONOCYTES # BLD: 1 K/UL (ref 0–1)
MONOCYTES NFR BLD AUTO: 5 % (ref 2–9)
MULTIPLIER, MUL: 0.02
MULTIPLIER, MUL: 0.02
MULTIPLIER, MUL: 0.03
MULTIPLIER, MUL: 0.03
MYELOCYTES NFR BLD MANUAL: 1 %
NEUTS BAND NFR BLD MANUAL: 4 % (ref 0–5)
NEUTS SEG # BLD: 16.5 K/UL (ref 1.8–8)
NEUTS SEG NFR BLD AUTO: 83 % (ref 42–75)
NITRITE UR QL STRIP.AUTO: NEGATIVE
ORDER INITIALS, ORDINIT: NORMAL
P-R INTERVAL, ECG05: 114 MS
PH UR STRIP: 6.5 [PH] (ref 5–8)
PLATELET # BLD AUTO: 324 K/UL (ref 135–420)
PLATELET COMMENTS,PCOM: ABNORMAL
PMV BLD AUTO: 10 FL (ref 9.2–11.8)
POTASSIUM SERPL-SCNC: 4.5 MMOL/L (ref 3.5–5.5)
PROT SERPL-MCNC: 7.7 G/DL (ref 6.4–8.2)
PROT UR STRIP-MCNC: 30 MG/DL
PROTHROMBIN TIME: 38.8 SEC (ref 11.5–15.2)
Q-T INTERVAL, ECG07: 446 MS
QRS DURATION, ECG06: 172 MS
QTC CALCULATION (BEZET), ECG08: 501 MS
RBC # BLD AUTO: 4.23 M/UL (ref 4.2–5.3)
RBC #/AREA URNS HPF: ABNORMAL /HPF (ref 0–5)
RBC MORPH BLD: ABNORMAL
SODIUM SERPL-SCNC: 130 MMOL/L (ref 136–145)
SP GR UR REFRACTOMETRY: 1.02 (ref 1–1.03)
TROPONIN I SERPL-MCNC: <0.02 NG/ML (ref 0–0.04)
UROBILINOGEN UR QL STRIP.AUTO: 0.2 EU/DL (ref 0.2–1)
VENTRICULAR RATE, ECG03: 76 BPM
WBC # BLD AUTO: 19 K/UL (ref 4.6–13.2)
WBC URNS QL MICRO: ABNORMAL /HPF (ref 0–4)

## 2017-03-30 RX ORDER — SODIUM CHLORIDE 9 MG/ML
250 INJECTION, SOLUTION INTRAVENOUS AS NEEDED
Status: DISCONTINUED | OUTPATIENT
Start: 2017-03-30 | End: 2017-04-06 | Stop reason: HOSPADM

## 2017-03-30 RX ORDER — DIPHENHYDRAMINE HYDROCHLORIDE 50 MG/ML
12.5 INJECTION, SOLUTION INTRAMUSCULAR; INTRAVENOUS
Status: DISCONTINUED | OUTPATIENT
Start: 2017-03-30 | End: 2017-04-02

## 2017-03-30 RX ORDER — HYDROMORPHONE HYDROCHLORIDE 1 MG/ML
0.5 INJECTION, SOLUTION INTRAMUSCULAR; INTRAVENOUS; SUBCUTANEOUS ONCE
Status: COMPLETED | OUTPATIENT
Start: 2017-03-30 | End: 2017-03-30

## 2017-03-30 RX ORDER — CARVEDILOL 25 MG/1
25 TABLET ORAL 2 TIMES DAILY
Status: DISCONTINUED | OUTPATIENT
Start: 2017-03-30 | End: 2017-03-31

## 2017-03-30 RX ORDER — SODIUM CHLORIDE 9 MG/ML
75 INJECTION, SOLUTION INTRAVENOUS CONTINUOUS
Status: DISCONTINUED | OUTPATIENT
Start: 2017-03-30 | End: 2017-03-31

## 2017-03-30 RX ORDER — HYDROMORPHONE HYDROCHLORIDE 1 MG/ML
1 INJECTION, SOLUTION INTRAMUSCULAR; INTRAVENOUS; SUBCUTANEOUS ONCE
Status: COMPLETED | OUTPATIENT
Start: 2017-03-30 | End: 2017-03-30

## 2017-03-30 RX ORDER — DEXTROSE 50 % IN WATER (D50W) INTRAVENOUS SYRINGE
25-50 AS NEEDED
Status: DISCONTINUED | OUTPATIENT
Start: 2017-03-30 | End: 2017-03-31

## 2017-03-30 RX ORDER — ONDANSETRON 2 MG/ML
4 INJECTION INTRAMUSCULAR; INTRAVENOUS
Status: DISCONTINUED | OUTPATIENT
Start: 2017-03-30 | End: 2017-04-06 | Stop reason: HOSPADM

## 2017-03-30 RX ORDER — HYDROMORPHONE HYDROCHLORIDE 1 MG/ML
1 INJECTION, SOLUTION INTRAMUSCULAR; INTRAVENOUS; SUBCUTANEOUS
Status: DISCONTINUED | OUTPATIENT
Start: 2017-03-30 | End: 2017-04-06

## 2017-03-30 RX ORDER — ACETAMINOPHEN 325 MG/1
650 TABLET ORAL
Status: DISCONTINUED | OUTPATIENT
Start: 2017-03-30 | End: 2017-03-31

## 2017-03-30 RX ORDER — PHYTONADIONE 5 MG/1
5 TABLET ORAL
Status: COMPLETED | OUTPATIENT
Start: 2017-03-30 | End: 2017-03-30

## 2017-03-30 RX ORDER — ALLOPURINOL 100 MG/1
100 TABLET ORAL 2 TIMES DAILY
Status: DISCONTINUED | OUTPATIENT
Start: 2017-03-30 | End: 2017-04-06 | Stop reason: HOSPADM

## 2017-03-30 RX ORDER — MAGNESIUM SULFATE 100 %
4 CRYSTALS MISCELLANEOUS AS NEEDED
Status: DISCONTINUED | OUTPATIENT
Start: 2017-03-30 | End: 2017-03-31

## 2017-03-30 RX ORDER — OXYCODONE AND ACETAMINOPHEN 5; 325 MG/1; MG/1
2 TABLET ORAL
Status: DISCONTINUED | OUTPATIENT
Start: 2017-03-30 | End: 2017-03-31

## 2017-03-30 RX ORDER — GABAPENTIN 300 MG/1
300 CAPSULE ORAL 2 TIMES DAILY
Status: DISCONTINUED | OUTPATIENT
Start: 2017-03-30 | End: 2017-04-06 | Stop reason: HOSPADM

## 2017-03-30 RX ORDER — DIPHENHYDRAMINE HYDROCHLORIDE 50 MG/ML
50 INJECTION, SOLUTION INTRAMUSCULAR; INTRAVENOUS ONCE
Status: COMPLETED | OUTPATIENT
Start: 2017-03-30 | End: 2017-03-30

## 2017-03-30 RX ADMIN — ALLOPURINOL 100 MG: 100 TABLET ORAL at 21:05

## 2017-03-30 RX ADMIN — AZTREONAM 1 G: 1 INJECTION, POWDER, LYOPHILIZED, FOR SOLUTION INTRAMUSCULAR; INTRAVENOUS at 13:27

## 2017-03-30 RX ADMIN — PHYTONADIONE 5 MG: 5 TABLET ORAL at 21:05

## 2017-03-30 RX ADMIN — GABAPENTIN 300 MG: 300 CAPSULE ORAL at 21:51

## 2017-03-30 RX ADMIN — DIPHENHYDRAMINE HYDROCHLORIDE 50 MG: 50 INJECTION, SOLUTION INTRAMUSCULAR; INTRAVENOUS at 12:10

## 2017-03-30 RX ADMIN — HYDROMORPHONE HYDROCHLORIDE 1 MG: 1 INJECTION, SOLUTION INTRAMUSCULAR; INTRAVENOUS; SUBCUTANEOUS at 21:51

## 2017-03-30 RX ADMIN — HYDROMORPHONE HYDROCHLORIDE 1 MG: 1 INJECTION, SOLUTION INTRAMUSCULAR; INTRAVENOUS; SUBCUTANEOUS at 12:10

## 2017-03-30 RX ADMIN — CARVEDILOL 25 MG: 25 TABLET, FILM COATED ORAL at 21:05

## 2017-03-30 RX ADMIN — AZTREONAM 1 G: 1 INJECTION, POWDER, LYOPHILIZED, FOR SOLUTION INTRAMUSCULAR; INTRAVENOUS at 21:06

## 2017-03-30 RX ADMIN — HYDROMORPHONE HYDROCHLORIDE 1 MG: 1 INJECTION, SOLUTION INTRAMUSCULAR; INTRAVENOUS; SUBCUTANEOUS at 13:26

## 2017-03-30 RX ADMIN — SODIUM CHLORIDE 75 ML/HR: 900 INJECTION, SOLUTION INTRAVENOUS at 21:05

## 2017-03-30 RX ADMIN — SODIUM CHLORIDE 6.4 UNITS/HR: 900 INJECTION, SOLUTION INTRAVENOUS at 18:59

## 2017-03-30 RX ADMIN — HYDROMORPHONE HYDROCHLORIDE 0.5 MG: 1 INJECTION, SOLUTION INTRAMUSCULAR; INTRAVENOUS; SUBCUTANEOUS at 18:10

## 2017-03-30 NOTE — IP AVS SNAPSHOT
Current Discharge Medication List  
  
START taking these medications Dose & Instructions Dispensing Information Comments Morning Noon Evening Bedtime  
 bisacodyl 10 mg suppository Commonly known as:  DULCOLAX Your last dose was: Your next dose is:    
   
   
 Dose:  10 mg Insert 10 mg into rectum daily as needed. Quantity:  10 Suppository Refills:  0  
     
   
   
   
  
 docusate sodium 100 mg capsule Commonly known as:  Slim [place] Your last dose was: Your next dose is:    
   
   
 Dose:  100 mg Take 1 Cap by mouth two (2) times a day. Quantity:  60 Cap Refills:  0  
     
   
   
   
  
 folic acid 1 mg tablet Commonly known as:  [employer] Your last dose was: Your next dose is:    
   
   
 Dose:  1 mg Take 1 Tab by mouth daily. Quantity:  30 Tab Refills:  0  
     
   
   
   
  
 oxyCODONE-acetaminophen 7.5-325 mg per tablet Commonly known as:  PERCOCET 7.5 Replaces:  oxyCODONE-acetaminophen 5-325 mg per tablet Your last dose was: Your next dose is:    
   
   
 Dose:  1 Tab Take 1 Tab by mouth every six (6) hours as needed. Max Daily Amount: 4 Tabs. Quantity:  18 Tab Refills:  0  
     
   
   
   
  
 polyethylene glycol 17 gram packet Commonly known as:  Edgar Martin Your last dose was: Your next dose is:    
   
   
 Dose:  17 g Take 1 Packet by mouth daily. Quantity:  30 Packet Refills:  0 CONTINUE these medications which have CHANGED Dose & Instructions Dispensing Information Comments Morning Noon Evening Bedtime  
 carvedilol 12.5 mg tablet Commonly known as:  Delano Lopez What changed:   
- medication strength 
- how much to take Your last dose was: Your next dose is:    
   
   
 Dose:  12.5 mg Take 1 Tab by mouth two (2) times a day. Quantity:  30 Tab Refills:  0 insulin glargine 100 unit/mL injection Commonly known as:  LANTUS What changed:  how much to take Your last dose was: Your next dose is:    
   
   
 Dose:  24 Units 24 Units by SubCUTAneous route two (2) times a day. Quantity:  1 Vial  
Refills:  0  
     
   
   
   
  
 OTHER What changed:   
- how much to take 
- how to take this - when to take this 
- additional instructions Your last dose was: Your next dose is:    
   
   
 Check CBC, CMP, Mg in 3 days Quantity:  1 Each Refills:  0 CONTINUE these medications which have NOT CHANGED Dose & Instructions Dispensing Information Comments Morning Noon Evening Bedtime  
 allopurinol 100 mg tablet Commonly known as:  Concepcion Sale Your last dose was: Your next dose is:    
   
   
 Dose:  100 mg Take 100 mg by mouth two (2) times a day. Refills:  0  
     
   
   
   
  
 ferrous sulfate 325 mg (65 mg iron) tablet Your last dose was: Your next dose is:    
   
   
 Dose:  325 mg Take 1 Tab by mouth two (2) times daily (with meals). Quantity:  30 Tab Refills:  0  
     
   
   
   
  
 gabapentin 300 mg tablet Commonly known as:  NEURONTIN Your last dose was: Your next dose is:    
   
   
 Dose:  300 mg Take 300 mg by mouth two (2) times daily as needed. Refills:  0  
     
   
   
   
  
 insulin aspart 100 unit/mL injection Commonly known as:  Violeta Littleer Your last dose was: Your next dose is: INITIATE INSULIN CORRECTIVE PROTOCOL (HR): Normal Insulin Sensitivity  For Blood Sugar (mg/dL) of:    Less than 150 =   0 units          150 -199 =   2 units 200 -249 =   4 units 250 -299 =   6 units 300 -349 =   8 units 350 and above =   10 units If 2 glucose readings are above 200 mg/dL Quantity:  10 mL Refills:  6  
     
   
   
   
  
 pravastatin 40 mg tablet Commonly known as:  PRAVACHOL  
   
 Your last dose was: Your next dose is:    
   
   
 Dose:  40 mg Take 40 mg by mouth nightly. Refills:  0  
     
   
   
   
  
 VITAMIN D 5,000 unit Tab tablet Generic drug:  cholecalciferol (VITAMIN D3) Your last dose was: Your next dose is:    
   
   
 Dose:  1 Tab Take 1 Tab by mouth daily. Refills:  0 STOP taking these medications   
 bumetanide 1 mg tablet Commonly known as:  BUMEX  
   
  
 cyclobenzaprine 10 mg tablet Commonly known as:  FLEXERIL  
   
  
 ondansetron 4 mg disintegrating tablet Commonly known as:  ZOFRAN ODT  
   
  
 oxyCODONE-acetaminophen 5-325 mg per tablet Commonly known as:  PERCOCET Replaced by:  oxyCODONE-acetaminophen 7.5-325 mg per tablet  
   
  
 potassium chloride 20 mEq tablet Commonly known as:  KLOR-CON M20 SITagliptin 50 mg tablet Commonly known as:  JANUVIA  
   
  
 warfarin 2 mg tablet Commonly known as:  COUMADIN Where to Get Your Medications Information on where to get these meds will be given to you by the nurse or doctor. ! Ask your nurse or doctor about these medications  
  bisacodyl 10 mg suppository  
 carvedilol 12.5 mg tablet  
 docusate sodium 100 mg capsule  
 folic acid 1 mg tablet  
 insulin glargine 100 unit/mL injection OTHER  
 oxyCODONE-acetaminophen 7.5-325 mg per tablet  
 polyethylene glycol 17 gram packet

## 2017-03-30 NOTE — ED NOTES
Dr. Elston Simmonds made aware that the patient's heart felt like it was racing while received half of the 1 MG of Dilaudid. I stopped and wasted the 0.5 MG with [facility], RN. Dr. Elston Simmonds made aware that patient did not finished ordered dose.

## 2017-03-30 NOTE — IP AVS SNAPSHOT
[address] [address] Yaritzae Judd Patient: Junior Durant MRN: [id number] [facility]:6/18/6277 You are allergic to the following Allergen Reactions Vancomycin Itching Ampicillin Itching Bactrim (Sulfamethoxazole-Trimethoprim) Unknown (comments) Blueberry Swelling Causes throat swelling Ciprofloxacin Itching Codeine Other (comments) Jumpy feeling Crestor (Rosuvastatin) Itching Darvocet A500 (Propoxyphene N-Acetaminophen) Itching Demerol (Meperidine) Itching Levaquin (Levofloxacin) Itching Lipitor (Atorvastatin) Myalgia Magnesium Oxide Itching  
 nausea Minocin (Minocycline) Unknown (comments) Pcn (Penicillins) Itching Pravachol (Pravastatin) Swelling Swelling in mouth Sulfa (Sulfonamide Antibiotics) Itching Ultracet (Tramadol-Acetaminophen) Itching Vicodin (Hydrocodone-Acetaminophen) Unknown (comments) Vytorin 10-10 (Ezetimibe-Simvastatin) Myalgia Percodan (Oxycodone Hcl-Oxycodone-Asa) Itching Recent Documentation Weight Breastfeeding? BMI OB Status Smoking Status 103 kg No 35.57 kg/m2 Hysterectomy Never Smoker Emergency Contacts Name Discharge Info Relation Home Work Mobile Tomi Gonzalez DISCHARGE CAREGIVER [3] Spouse [3] [phone] About your hospitalization You were admitted on:  March 30, 2017 You last received care in the:  [facility] [address] 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), Benign Hypertensive Heart Disease With Systolic Chf, Nyha Class 2 (Hcc), Type 2 Diabetes Mellitus With Diabetic Neuropathy (Hcc), Decreased Calculated Glomerular Filtration Rate (Gfr), Generalized Weakness, Impaired Mobility And Adls Providers Seen During Your Hospitalizations Provider Role Specialty Primary office phone Glenny Salgado DO Attending Provider Emergency Medicine [phone] Bruno Haywood MD Attending Provider Internal Medicine [phone] Marilee Goldman MD Attending Provider Internal Medicine [phone] Your Primary Care Physician (PCP) Primary Care Physician Office Phone Office Fax BRYANNA BIRD ** None ** ** None ** Follow-up Information Follow up With Details Comments Contact Info Refugio Johnson MD     
  
Your Appointments Friday April 07, 2017  2:20 PM EDT  
EMG with Arslan Castaneda MD  
[facility] [address] [facility] [id number] [phone] Wednesday April 19, 2017  9:45 AM EDT Any with Isa Pizarro MD  
[facility] ([address] [place] [address]) [facility] [address]  
[phone] Wednesday May 17, 2017  9:20 AM EDT Follow Up with Sandra Zamora DO [facility] ([address] [place] [address]) [address] [address] [phone] Thursday May 18, 2017  9:00 AM EDT Nurse Visit with [address] [place] [address],[address]  
[facility] ([address] [place] [address]) [facility] ([address] [place] [address]) [phone] Current Discharge Medication List  
  
START taking these medications Dose & Instructions Dispensing Information Comments Morning Noon Evening Bedtime  
 bisacodyl 10 mg suppository Commonly known as:  DULCOLAX Your last dose was: Your next dose is:    
   
   
 Dose:  10 mg Insert 10 mg into rectum daily as needed. Quantity:  10 Suppository Refills:  0  
     
   
   
   
  
 docusate sodium 100 mg capsule Commonly known as:  Adriana Sandhu Your last dose was: Your next dose is:    
   
   
 Dose:  100 mg Take 1 Cap by mouth two (2) times a day. Quantity:  60 Cap Refills:  0  
     
   
   
   
  
 folic acid 1 mg tablet Commonly known as:  Kurt Your last dose was: Your next dose is:    
   
   
 Dose:  1 mg Take 1 Tab by mouth daily. Quantity:  30 Tab Refills:  0  
     
   
   
   
  
 oxyCODONE-acetaminophen 7.5-325 mg per tablet Commonly known as:  PERCOCET 7.5 Replaces:  oxyCODONE-acetaminophen 5-325 mg per tablet Your last dose was: Your next dose is:    
   
   
 Dose:  1 Tab Take 1 Tab by mouth every six (6) hours as needed. Max Daily Amount: 4 Tabs. Quantity:  18 Tab Refills:  0  
     
   
   
   
  
 polyethylene glycol 17 gram packet Commonly known as:  Nabil Arguello Your last dose was: Your next dose is:    
   
   
 Dose:  17 g Take 1 Packet by mouth daily. Quantity:  30 Packet Refills:  0 CONTINUE these medications which have CHANGED Dose & Instructions Dispensing Information Comments Morning Noon Evening Bedtime  
 carvedilol 12.5 mg tablet Commonly known as:  Irena Almendarez What changed:   
- medication strength 
- how much to take Your last dose was: Your next dose is:    
   
   
 Dose:  12.5 mg Take 1 Tab by mouth two (2) times a day. Quantity:  30 Tab Refills:  0  
     
   
   
   
  
 insulin glargine 100 unit/mL injection Commonly known as:  LANTUS What changed:  how much to take Your last dose was: Your next dose is:    
   
   
 Dose:  24 Units 24 Units by SubCUTAneous route two (2) times a day.   
 Quantity:  1 Vial  
 Refills:  0  
     
   
   
   
  
 OTHER What changed:   
- how much to take 
- how to take this - when to take this 
- additional instructions Your last dose was: Your next dose is:    
   
   
 Check CBC, CMP, Mg in 3 days Quantity:  1 Each Refills:  0 CONTINUE these medications which have NOT CHANGED Dose & Instructions Dispensing Information Comments Morning Noon Evening Bedtime  
 allopurinol 100 mg tablet Commonly known as:  Kermit Hernandez Your last dose was: Your next dose is:    
   
   
 Dose:  100 mg Take 100 mg by mouth two (2) times a day. Refills:  0  
     
   
   
   
  
 ferrous sulfate 325 mg (65 mg iron) tablet Your last dose was: Your next dose is:    
   
   
 Dose:  325 mg Take 1 Tab by mouth two (2) times daily (with meals). Quantity:  30 Tab Refills:  0  
     
   
   
   
  
 gabapentin 300 mg tablet Commonly known as:  NEURONTIN Your last dose was: Your next dose is:    
   
   
 Dose:  300 mg Take 300 mg by mouth two (2) times daily as needed. Refills:  0  
     
   
   
   
  
 insulin aspart 100 unit/mL injection Commonly known as:  Nikita Colunga Your last dose was: Your next dose is: INITIATE INSULIN CORRECTIVE PROTOCOL (HR): Normal Insulin Sensitivity  For Blood Sugar (mg/dL) of:    Less than 150 =   0 units          150 -199 =   2 units 200 -249 =   4 units 250 -299 =   6 units 300 -349 =   8 units 350 and above =   10 units If 2 glucose readings are above 200 mg/dL Quantity:  10 mL Refills:  6  
     
   
   
   
  
 pravastatin 40 mg tablet Commonly known as:  PRAVACHOL Your last dose was: Your next dose is:    
   
   
 Dose:  40 mg Take 40 mg by mouth nightly. Refills:  0  
     
   
   
   
  
 VITAMIN D 5,000 unit Tab tablet Generic drug:  cholecalciferol (VITAMIN D3) Your last dose was: Your next dose is:    
   
   
 Dose:  1 Tab Take 1 Tab by mouth daily. Refills:  0 STOP taking these medications   
 bumetanide 1 mg tablet Commonly known as:  BUMEX  
   
  
 cyclobenzaprine 10 mg tablet Commonly known as:  FLEXERIL  
   
  
 ondansetron 4 mg disintegrating tablet Commonly known as:  ZOFRAN ODT  
   
  
 oxyCODONE-acetaminophen 5-325 mg per tablet Commonly known as:  PERCOCET Replaced by:  oxyCODONE-acetaminophen 7.5-325 mg per tablet  
   
  
 potassium chloride 20 mEq tablet Commonly known as:  KLOR-CON M20 SITagliptin 50 mg tablet Commonly known as:  JANUVIA  
   
  
 warfarin 2 mg tablet Commonly known as:  COUMADIN Where to Get Your Medications Information on where to get these meds will be given to you by the nurse or doctor. ! Ask your nurse or doctor about these medications  
  bisacodyl 10 mg suppository  
 carvedilol 12.5 mg tablet  
 docusate sodium 100 mg capsule  
 folic acid 1 mg tablet  
 insulin glargine 100 unit/mL injection OTHER  
 oxyCODONE-acetaminophen 7.5-325 mg per tablet  
 polyethylene glycol 17 gram packet Discharge Instructions Patient armband removed and shredded DISCHARGE SUMMARY from Nurse The following personal items are in your possession at time of discharge: 
 
Dental Appliances: None Visual Aid: None Home Medications: None Jewelry: Earrings, Bracelet (1 pair earrings/bracelet alert) Clothing: Undergarments, Pajamas, At bedside, Shirt Other Valuables: None Personal Items Sent to Safe: none PATIENT INSTRUCTIONS: 
 
 
F-face looks uneven A-arms unable to move or move unevenly S-speech slurred or non-existent T-time-call 911 as soon as signs and symptoms begin-DO NOT go  
 Back to bed or wait to see if you get better-TIME IS BRAIN. Warning Signs of HEART ATTACK Call 911 if you have these symptoms: 
? Chest discomfort. Most heart attacks involve discomfort in the center of the chest that lasts more than a few minutes, or that goes away and comes back. It can feel like uncomfortable pressure, squeezing, fullness, or pain. ? Discomfort in other areas of the upper body. Symptoms can include pain or discomfort in one or both arms, the back, neck, jaw, or stomach. ? Shortness of breath with or without chest discomfort. ? Other signs may include breaking out in a cold sweat, nausea, or lightheadedness. Don't wait more than five minutes to call [address]! Fast action can save your life. Calling 911 is almost always the fastest way to get lifesaving treatment. Emergency Medical Services staff can begin treatment when they arrive  up to an hour sooner than if someone gets to the hospital by car. The discharge information has been reviewed with the patient. The patient verbalized understanding. Discharge medications reviewed with the patient and appropriate educational materials and side effects teaching were provided. Discharge Orders None REMOTV Announcement We are excited to announce that we are making your provider's discharge notes available to you in REMOTV. You will see these notes when they are completed and signed by the physician that discharged you from your recent hospital stay. If you have any questions or concerns about any information you see in REMOTV, please call the Health Information Department where you were seen or reach out to your Primary Care Provider for more information about your plan of care. Introducing [facility] SERVICES! Raimundo Jones introduces REMOTV patient portal. Now you can access parts of your medical record, email your doctor's office, and request medication refills online. 1. In your internet browser, go to [email or web address] 2. Click on the First Time User? Click Here link in the Sign In box. You will see the New Member Sign Up page. 3. Enter your High[employer] Access Code exactly as it appears below. You will not need to use this code after youve completed the sign-up process. If you do not sign up before the expiration date, you must request a new code. · Highcon Access Code: [email or web address] Expires: 5/1/2017  6:05 PM 
 
4. Enter the last four digits of your Social Security Number (xxxx) and Date of Birth (mm/dd/yyyy) as indicated and click Submit. You will be taken to the next sign-up page. 5. Create a Highcon ID. This will be your High[employer] login ID and cannot be changed, so think of one that is secure and easy to remember. 6. Create a High[employer] password. You can change your password at any time. 7. Enter your Password Reset Question and Answer. This can be used at a later time if you forget your password. 8. Enter your e-mail address. You will receive e-mail notification when new information is available in [address] 9. Click Sign Up. You can now view and download portions of your medical record. 10. Click the Download Summary menu link to download a portable copy of your medical information. If you have questions, please visit the Frequently Asked Questions section of the High[employer] website. Remember, High[employer] is NOT to be used for urgent needs. For medical emergencies, dial 911. Now available from your iPhone and Android! General Information Please provide this summary of care documentation to your next provider. Patient Signature:  ____________________________________________________________ Date:  ____________________________________________________________  
  
Elliott Livings Provider Signature:  ____________________________________________________________ Date:  ____________________________________________________________

## 2017-03-30 NOTE — ED TRIAGE NOTES
Patient was brought to the ED via EMS for the C/O back and hip pain. The patient fell two days ago, but has had this back pain for a few weeks. The patient has an internal pacemaker and defibrillator in her abdomen. The patient was Dx with a Kidney infections two weeks ago. BGL for EMS was 465. 20 G IV right AC.

## 2017-03-30 NOTE — Clinical Note
Status[de-identified] Inpatient [101] Type of Bed: Stepdown [17] Inpatient Hospitalization Certified Necessary for the Following Reasons: 3. Patient receiving treatment that can only be provided in an inpatient setting (further clarification in H&P documentation) Admitting Diagnosis: Psoas hematoma, right, secondary to anticoagulant therapy, sequela [[id number]] Admitting Physician: Evelyn Mancia [[id number]] Attending Physician: Evelyn Mancia [[id number]] Estimated Length of Stay: 3-4 Midnights Discharge Plan[de-identified] [facility]

## 2017-03-30 NOTE — PROGRESS NOTES
Received a fax from [address] [place] [address] with a PST test date of 3/22/17 but visit date of 3/29/17. Patient's INR was reported as 5.4 on 3/24/17 by her daughter who stated patient wasn't feeling well. Patient is current at [facility] ED and in the process of being admitted according to [facility] Coumadin will be dosed by hospitalist during admission and our office will resume management upon discharge. Left message on patient's voicemail to call the office upon discharge Meron Spence LPN

## 2017-03-30 NOTE — ED PROVIDER NOTES
HPI Comments: 11:44 AM Benjamin Obregon is a 77 y.o.  [place] female with a hx of CHF, HTN, diabetes, neuropathy, pacemaker placement in her abdomen, CKD, cholecystectomy, and a hysterectomy who presents to the ED c/o left hip pain that started two weeks ago. She notes associated left lower back pain for the past two weeks. She was seen at [facility] twice last week. The first time she was discharged with Doxycycline after a shot for pain, but she had no improvement. Per her daughter, she was then seen again and had a negative renal US and she was prescribed Cipro and Percocet and instructed to take the Cipro with Benadryl. She stated to feel better with that treatment, but her sx would intermittently return throughout her treatment course. She took the abx as prescribed. The pt then fell two days ago and landed on her right hip. Since then her left hip pain and left lower back pain worsened, she developed left upper leg pain that radiates around into her left buttock and she developed right hip pain that radiates into her RLE. She has been able to ambulate normally since then, but she has been unable to tolerate the pain. The pain worsened this morning and she developed chills. The pt denies urinary frequency, dysuria, hematuria, numbness in the lower extremities, incontinence, fever, abdominal pain, and any further complaints. The history is provided by the patient. Past Medical History:   Diagnosis Date    Acute venous embolism and thrombosis of deep veins of upper extremity ([address] 9/5/2012    Anemia in chronic kidney disease(285[address]21) 9/5/2012    Anticoagulated on Coumadin 9/5/2012    Biventricular implantable cardiac defibrillator in situ 4/28/05    upgrade to BiV AICD, gen change 4/08, pocket revision 10/09    Cardiac cath 08/15/1996    Patent coronaries. Elev LVEDP. EF 50-55%.  Cardiac echocardiogram 06/23/2015    Ltd study. EF 45-50%. Mild, diffuse hypk. Severe apical hypk.   No mass or thrombus was clearly identified, although imaging was suboptimal.      Cardiac nuclear imaging test 06/19/2015    Fixed distal apical, distal septal defect more likely due to RV pacing than prior infarct. No ischemia. EF 46%. RWMA c/w RV pacing. Nondiagnostic EKG on pharm stress test.      Cardiovascular lower extremity venous duplex 09/04/2012    Acute, non-occlusive DVT in CFV on right. No DVT on left. No superficial thrombosis bilaterally.  Cardiovascular upper extremity venous duplex 08/27/2012    DVT in axillary vein on left. Left subclavian was not visualized.     CHF (congestive heart failure) (HCC)     Chronic kidney disease, stage IV (severe) ([address] 9/5/2012    Congestive heart failure, unspecified     Diabetes ([facility]     Diabetic peripheral neuropathy associated with type 2 diabetes mellitus ([address] 6/28/2011    Difficult airway for intubation 08/22/2012    see anesthesia airway note    Generalized weakness 9/5/2012    Hypercholesterolemia     Hypertension     LBBB (left bundle branch block)     Nonischemic cardiomyopathy     Obesity (BMI 35.0-39.9 without comorbidity) ([address] 3/13/2017    Pacemaker 12/04    status post DDD permanent pacemaker    Peripheral neuropathy ([address]     secondary due to diabetes    Postoperative anemia due to acute blood loss 9/5/2012    Type II or unspecified type diabetes mellitus with neurological manifestations, not stated as uncontrolled 6/28/2011    Unspecified essential hypertension 9/5/2012       Past Surgical History:   Procedure Laterality Date    HX CARPAL TUNNEL RELEASE  4/07    right     HX CHOLECYSTECTOMY  1994    HX HYSTERECTOMY  1973    HX OTHER SURGICAL  6/11/2012    AICD revision    HX PACEMAKER  4/28/2005    [employer] AICD         Family History:   Problem Relation Age of Onset    Cancer Father        Social History     Social History    Marital status:      Spouse name: N/A    Number of children: N/A    Years of education: N/A     Occupational History    Not on file. Social History Main Topics    Smoking status: Never Smoker    Smokeless tobacco: Never Used    Alcohol use No    Drug use: No    Sexual activity: Yes     Partners: Male     Other Topics Concern    Not on file     Social History Narrative         ALLERGIES: Vancomycin; Ampicillin; Bactrim [sulfamethoxazole-trimethoprim]; Ciprofloxacin; Codeine; Crestor [rosuvastatin]; Darvocet a500 [propoxyphene n-acetaminophen]; Demerol [meperidine]; Levaquin [levofloxacin]; Lipitor [atorvastatin]; Magnesium oxide; Minocin [minocycline]; Pcn [penicillins]; Pravachol [pravastatin]; Sulfa (sulfonamide antibiotics); Ultracet [tramadol-acetaminophen]; Vicodin [hydrocodone-acetaminophen]; Vytorin 10-10 [ezetimibe-simvastatin]; and Percodan [oxycodone hcl-oxycodone-asa]    Review of Systems   Constitutional: Negative for chills, diaphoresis, fatigue, fever and unexpected weight change. HENT: Negative for congestion, dental problem, ear discharge, ear pain, hearing loss, nosebleeds, postnasal drip, sinus pressure, sore throat, trouble swallowing and voice change. Eyes: Negative for photophobia, pain, discharge, redness and visual disturbance. Respiratory: Negative for cough, chest tightness, shortness of breath, wheezing and stridor. Cardiovascular: Negative for chest pain, palpitations and leg swelling. Gastrointestinal: Negative for abdominal distention, abdominal pain, anal bleeding, blood in stool, constipation, diarrhea, nausea and vomiting. Genitourinary: Negative for difficulty urinating, dyspareunia, dysuria, flank pain, frequency, genital sores, hematuria, menstrual problem, pelvic pain, urgency, vaginal bleeding, vaginal discharge and vaginal pain. Musculoskeletal: Positive for arthralgias (Bilateral hips) and back pain. Negative for joint swelling, myalgias, neck pain and neck stiffness. Skin: Negative for color change, rash and wound. Neurological: Negative for dizziness, tremors, seizures, syncope, weakness, light-headedness, numbness and headaches. Hematological: Negative for adenopathy. Does not bruise/bleed easily. Psychiatric/Behavioral: Negative for agitation, confusion, decreased concentration, hallucinations, sleep disturbance and suicidal ideas. The patient is not nervous/anxious. All other systems reviewed and are negative. Vitals:    03/30/17 1156 03/30/17 1315 03/30/17 1325   BP: 125/77  138/62   Pulse: 80  82   Resp: 12  24   Temp: 98.4 °F (36.9 °C) 99.9 °F (37.7 °C)    SpO2: 100%  100%   Weight: 107.5 kg (237 lb)              Physical Exam   Constitutional: She is oriented to person, place, and time. She appears well-developed and well-nourished. She appears distressed. 77year old  female in severe painful distress. HENT:   Head: Normocephalic and atraumatic. Right Ear: External ear normal.   Left Ear: External ear normal.   Nose: Nose normal.   Mouth/Throat: Oropharynx is clear and moist. No oropharyngeal exudate. Eyes: Conjunctivae and EOM are normal. Pupils are equal, round, and reactive to light. Right eye exhibits no discharge. Left eye exhibits no discharge. No scleral icterus. Neck: Normal range of motion. Neck supple. No JVD present. No tracheal deviation present. No thyromegaly present. Cardiovascular: Normal rate, regular rhythm and intact distal pulses. Exam reveals no gallop and no friction rub. Murmur (Gr II/VI systolic murmur) heard. Pulmonary/Chest: Effort normal and breath sounds normal. No stridor. No respiratory distress. She has no wheezes. She has no rales. She exhibits no tenderness. Abdominal: Soft. Bowel sounds are normal. She exhibits no distension and no mass. There is no tenderness. There is no rebound and no guarding. Musculoskeletal: Normal range of motion. She exhibits no edema, tenderness or deformity.    Left CVA tenderness, Left lumbar para-spinal tenderness. No midline tenderness. Right CVA tenderness. Lymphadenopathy:     She has no cervical adenopathy. Neurological: She is alert and oriented to person, place, and time. She has normal reflexes. No cranial nerve deficit. Skin: Skin is warm and dry. No rash noted. She is not diaphoretic. No erythema. No pallor. Psychiatric: She has a normal mood and affect. Her behavior is normal. Judgment normal.   Nursing note and vitals reviewed. MDM  Number of Diagnoses or Management Options  Hematoma of right iliopsoas muscle, initial encounter:   Pyelonephritis:   Diagnosis management comments: Differential includes:  Pyelonephritis, Renal Colic, Fracture, Diverticular disease, Hematoma, DKA, HHNK.        Amount and/or Complexity of Data Reviewed  Clinical lab tests: reviewed and ordered  Tests in the radiology section of CPT®: ordered and reviewed  Tests in the medicine section of CPT®: ordered and reviewed  Review and summarize past medical records: yes  Independent visualization of images, tracings, or specimens: yes    Risk of Complications, Morbidity, and/or Mortality  Presenting problems: high  Diagnostic procedures: high  Management options: high      ED Course       Procedures  ED Encounter Orders:  Orders Placed This Encounter    CULTURE, BLOOD    CULTURE, BLOOD    CT ABD PELV WO CONT    CBC WITH AUTOMATED DIFF    METABOLIC PANEL, COMPREHENSIVE    URINALYSIS W/ RFLX MICROSCOPIC    CARDIAC PANEL,(CK, CKMB & TROPONIN)    URINE MICROSCOPIC ONLY    POC LACTIC ACID    MEASURE RECTAL TEMPERATURE    GLUCOSE, POC    POC LACTIC ACID    EKG, 12 LEAD, INITIAL    aztreonam (AZACTAM) 1 g in 0.9% sodium chloride (MBP/ADV) 100 mL MBP    diphenhydrAMINE (BENADRYL) injection 50 mg    HYDROmorphone (PF) (DILAUDID) injection 1 mg    HYDROmorphone (PF) (DILAUDID) injection 1 mg    IP CONSULT TO HOSPITALIST         Vitals:  Patient Vitals for the past 12 hrs:   Temp Pulse Resp BP SpO2   03/30/17 1325 - 82 24 138/62 100 %   03/30/17 1315 99.9 °F (37.7 °C) - - - -   03/30/17 1156 98.4 °F (36.9 °C) 80 12 125/77 100 %     Pulse ox reviewed and WNL    Medications ordered:   Medications   aztreonam (AZACTAM) 1 g in 0.9% sodium chloride (MBP/ADV) 100 mL MBP (1 g IntraVENous New Bag 3/30/17 1327)   diphenhydrAMINE (BENADRYL) injection 50 mg (50 mg IntraVENous Given 3/30/17 1210)   HYDROmorphone (PF) (DILAUDID) injection 1 mg (1 mg IntraVENous Given 3/30/17 1210)   HYDROmorphone (PF) (DILAUDID) injection 1 mg (1 mg IntraVENous Given 3/30/17 1326)         Lab findings:  Recent Results (from the past 12 hour(s))   GLUCOSE, POC    Collection Time: 03/30/17 11:25 AM   Result Value Ref Range    Glucose (POC) 315 (H) 70 - 110 mg/dL   CBC WITH AUTOMATED DIFF    Collection Time: 03/30/17 11:26 AM   Result Value Ref Range    WBC 19.0 (H) 4.6 - 13.2 K/uL    RBC 4.23 4.20 - 5.30 M/uL    HGB 11.4 (L) 12.0 - 16.0 g/dL    HCT 33.0 (L) 35.0 - 45.0 %    MCV 78.0 74.0 - 97.0 FL    MCH 27.0 24.0 - 34.0 PG    MCHC 34.5 31.0 - 37.0 g/dL    RDW 14.8 (H) 11.6 - 14.5 %    PLATELET 343 456 - 689 K/uL    MPV 10.0 9.2 - 11.8 FL    NEUTROPHILS 83 (H) 42 - 75 %    BAND NEUTROPHILS 4 0 - 5 %    LYMPHOCYTES 6 (L) 20 - 51 %    MONOCYTES 5 2 - 9 %    EOSINOPHILS 0 0 - 5 %    BASOPHILS 0 0 - 3 %    METAMYELOCYTES 1 (H) 0 %    MYELOCYTES 1 (H) 0 %    ABS. NEUTROPHILS 16.5 (H) 1.8 - 8.0 K/UL    ABS. LYMPHOCYTES 1.1 0.8 - 3.5 K/UL    ABS. MONOCYTES 1.0 0 - 1.0 K/UL    ABS. EOSINOPHILS 0.0 0.0 - 0.4 K/UL    ABS.  BASOPHILS 0.0 0.0 - 0.06 K/UL    DF MANUAL      PLATELET COMMENTS ADEQUATE PLATELETS      RBC COMMENTS NORMOCYTIC, NORMOCHROMIC     METABOLIC PANEL, COMPREHENSIVE    Collection Time: 03/30/17 11:26 AM   Result Value Ref Range    Sodium 130 (L) 136 - 145 mmol/L    Potassium 4.5 3.5 - 5.5 mmol/L    Chloride 91 (L) 100 - 108 mmol/L    CO2 28 21 - 32 mmol/L    Anion gap 11 3.0 - 18 mmol/L    Glucose 414 (HH) 74 - 99 mg/dL    BUN 36 (H) 7.0 - 18 MG/DL    Creatinine 1.78 (H) 0.6 - 1.3 MG/DL    BUN/Creatinine ratio 20 12 - 20      GFR est AA 35 (L) >60 ml/min/1.73m2    GFR est non-AA 29 (L) >60 ml/min/1.73m2    Calcium 9.5 8.5 - 10.1 MG/DL    Bilirubin, total 1.6 (H) 0.2 - 1.0 MG/DL    ALT (SGPT) 35 13 - 56 U/L    AST (SGOT) 15 15 - 37 U/L    Alk. phosphatase 130 (H) 45 - 117 U/L    Protein, total 7.7 6.4 - 8.2 g/dL    Albumin 2.6 (L) 3.4 - 5.0 g/dL    Globulin 5.1 (H) 2.0 - 4.0 g/dL    A-G Ratio 0.5 (L) 0.8 - 1.7     CARDIAC PANEL,(CK, CKMB & TROPONIN)    Collection Time: 03/30/17 11:26 AM   Result Value Ref Range    CK 81 26 - 192 U/L    CK - MB 2.0 <3.6 ng/ml    CK-MB Index 2.5 0.0 - 4.0 %    Troponin-I, Qt. <0.02 0.0 - 0.045 NG/ML   EKG, 12 LEAD, INITIAL    Collection Time: 03/30/17 11:29 AM   Result Value Ref Range    Ventricular Rate 76 BPM    Atrial Rate 76 BPM    P-R Interval 114 ms    QRS Duration 172 ms    Q-T Interval 446 ms    QTC Calculation (Bezet) 501 ms    Calculated P Axis 53 degrees    Calculated R Axis 93 degrees    Calculated T Axis 95 degrees    Diagnosis       Electronic ventricular pacemaker  When compared with ECG of 19-JUN-2015 10:32,  Vent.  rate has decreased BY  43 BPM     POC LACTIC ACID    Collection Time: 03/30/17 12:27 PM   Result Value Ref Range    Lactic Acid (POC) 2.0 0.4 - 2.0 mmol/L   URINALYSIS W/ RFLX MICROSCOPIC    Collection Time: 03/30/17  1:20 PM   Result Value Ref Range    Color YELLOW      Appearance CLEAR      Specific gravity 1.018 1.005 - 1.030      pH (UA) 6.5 5.0 - 8.0      Protein 30 (A) NEG mg/dL    Glucose >1000 (A) NEG mg/dL    Ketone TRACE (A) NEG mg/dL    Bilirubin NEGATIVE  NEG      Blood SMALL (A) NEG      Urobilinogen 0.2 0.2 - 1.0 EU/dL    Nitrites NEGATIVE  NEG      Leukocyte Esterase NEGATIVE  NEG     URINE MICROSCOPIC ONLY    Collection Time: 03/30/17  1:20 PM   Result Value Ref Range    WBC 0 to 3 0 - 4 /hpf    RBC 4 to 10 0 - 5 /hpf    Epithelial cells FEW 0 - 5 /lpf    Bacteria FEW (A) NEG /hpf PROTHROMBIN TIME + INR    Collection Time: 03/30/17  5:35 PM   Result Value Ref Range    Prothrombin time 38.8 (H) 11.5 - 15.2 sec    INR 4.3 (H) 0.8 - 1.2     PTT    Collection Time: 03/30/17  5:35 PM   Result Value Ref Range    aPTT 47.9 (H) 23.0 - 36.4 SEC       EKG interpretation by ED Physician:  ED EKG interpretation:  Rhythm: paced rhythm. Rate (approx.): 76; This EKG was interpreted by William Ochoa DO,ED Provider. X-Ray, CT or other radiology findings or impressions:  CT ABD PELV WO CONT   Final Result   IMPRESSION:      1. Asymmetrically enlarged right iliopsoas muscle compared to the left extending  to the right groin region with evidence of intramuscular hematoma in different  stage. This is new compared to the recent CT 3/16/17. Mild fatty stranding noted  in the right pericolonic gutter and the right groin. No additional hematoma or  other significant injury seen.     2. Left renal cysts. Small hypodense and hyperdense nodules at the upper pole of  atrophic right kidney, unchanged since the prior.     3. Mild splenomegaly with nonspecific linear calcifications laterally and  anteriorly.     4. No hip fracture or lumbar compression fracture seen. Mild curvature to the  left in upper lumbar spine could be positional or mild scoliosis.     Thank you for this referral.     Per Dr. Caitlin Galindo, Radiology       Progress notes, Consult notes or additional Procedure notes:   3:32 PM I have reassessed the patient and discussed results and diagnosis. Pt will be admitted. Patient understands and verbalizes agreement with plan. Consult:  Discussed care with Dr. Eldon Callejas, Specialty: Hospitalist  Standard discussion; including history of patients chief complaint, available diagnostic results, and treatment course. He agrees on admission. He requests that Urology be consulted. 4:53 PM, 3/30/2017       Disposition:  Diagnosis:   1. Pyelonephritis    2.  Hematoma of right iliopsoas muscle, initial encounter Disposition: Admit     Patient's Medications   Start Taking    No medications on file   Continue Taking    ALLOPURINOL (ZYLOPRIM) 100 MG TABLET    Take 100 mg by mouth two (2) times a day. BUMETANIDE (BUMEX) 1 MG TABLET    TAKE 1 TABLET TWICE A DAY    CARVEDILOL (COREG) 25 MG TABLET    Take 1 Tab by mouth two (2) times a day. CHOLECALCIFEROL, VITAMIN D3, (VITAMIN D) 5,000 UNIT TAB    Take 1 Tab by mouth daily. CYCLOBENZAPRINE (FLEXERIL) 10 MG TABLET    Take 10 mg by mouth as needed. FERROUS SULFATE 325 MG (65 MG IRON) TABLET    Take 1 Tab by mouth two (2) times daily (with meals). GABAPENTIN (NEURONTIN) 300 MG TABLET    Take 300 mg by mouth two (2) times daily as needed. INSULIN ASPART (NOVOLOG) 100 UNIT/ML INJECTION    INITIATE INSULIN CORRECTIVE PROTOCOL (HR): Normal Insulin Sensitivity  For Blood Sugar (mg/dL) of:    Less than 150 =   0 units          150 -199 =   2 units 200 -249 =   4 units 250 -299 =   6 units 300 -349 =   8 units 350 and above =   10 units If 2 glucose readings are above 200 mg/dL    INSULIN GLARGINE (LANTUS) 100 UNIT/ML INJECTION    20 Units by SubCUTAneous route two (2) times a day. ONDANSETRON (ZOFRAN ODT) 4 MG DISINTEGRATING TABLET    Take 1 Tab by mouth every eight (8) hours as needed for Nausea. OTHER    Take 2 Tabs by mouth two (2) times a day. NeuRx-TF - total formulation for peripheral nerve health    OXYCODONE-ACETAMINOPHEN (PERCOCET) 5-325 MG PER TABLET    Take 1 Tab by mouth every four (4) hours as needed for Pain. Max Daily Amount: 6 Tabs. POTASSIUM CHLORIDE (KLOR-CON M20) 20 MEQ TABLET    Take 2 Tabs by mouth two (2) times a day. Or as directed    PRAVASTATIN (PRAVACHOL) 40 MG TABLET    Take 40 mg by mouth nightly. SITAGLIPTIN (JANUVIA) 50 MG TABLET    Take 1 Tab by mouth daily. WARFARIN (COUMADIN) 2 MG TABLET    Take 3 tablets by mouth daily or as directed by our office.     WARFARIN (COUMADIN) 2 MG TABLET    TAKE 3 TABLETS DAILY OR AS DIRECTED BY PRESCRIBER'S OFFICE   These Medications have changed    No medications on file   Stop Taking    No medications on file         SCRIBE ATTESTATION STATEMENT  Documented by: Jc Chacon for, and in the presence of, Sherry Rodriguez DO 12:11 PM    Signed by: Mary Swain, 03/30/17 12:11 PM      PROVIDER ATTESTATION STATEMENT  I personally performed the services described in the documentation, reviewed the documentation, as recorded by the scribe in my presence, and it accurately and completely records my words and actions.   Sherry Rodriguez DO

## 2017-03-31 LAB
ADMINISTERED INITIALS, ADMINIT: NORMAL
ALBUMIN SERPL BCP-MCNC: 2.3 G/DL (ref 3.4–5)
ALBUMIN/GLOB SERPL: 0.5 {RATIO} (ref 0.8–1.7)
ALP SERPL-CCNC: 120 U/L (ref 45–117)
ALT SERPL-CCNC: 36 U/L (ref 13–56)
ANION GAP BLD CALC-SCNC: 8 MMOL/L (ref 3–18)
AST SERPL W P-5'-P-CCNC: 31 U/L (ref 15–37)
BASOPHILS # BLD AUTO: 0 K/UL (ref 0–0.06)
BASOPHILS # BLD: 0 % (ref 0–3)
BILIRUB SERPL-MCNC: 1.1 MG/DL (ref 0.2–1)
BUN SERPL-MCNC: 41 MG/DL (ref 7–18)
BUN/CREAT SERPL: 22 (ref 12–20)
CALCIUM SERPL-MCNC: 9.3 MG/DL (ref 8.5–10.1)
CHLORIDE SERPL-SCNC: 97 MMOL/L (ref 100–108)
CO2 SERPL-SCNC: 30 MMOL/L (ref 21–32)
CREAT SERPL-MCNC: 1.88 MG/DL (ref 0.6–1.3)
D50 ADMINISTERED, D50ADM: 0 ML
D50 ORDER, D50ORD: 0 ML
DIFFERENTIAL METHOD BLD: ABNORMAL
EOSINOPHIL # BLD: 0 K/UL (ref 0–0.4)
EOSINOPHIL NFR BLD: 0 % (ref 0–5)
ERYTHROCYTE [DISTWIDTH] IN BLOOD BY AUTOMATED COUNT: 14.9 % (ref 11.6–14.5)
GLOBULIN SER CALC-MCNC: 4.6 G/DL (ref 2–4)
GLUCOSE BLD STRIP.AUTO-MCNC: 122 MG/DL (ref 70–110)
GLUCOSE BLD STRIP.AUTO-MCNC: 135 MG/DL (ref 70–110)
GLUCOSE BLD STRIP.AUTO-MCNC: 137 MG/DL (ref 70–110)
GLUCOSE BLD STRIP.AUTO-MCNC: 139 MG/DL (ref 70–110)
GLUCOSE BLD STRIP.AUTO-MCNC: 144 MG/DL (ref 70–110)
GLUCOSE BLD STRIP.AUTO-MCNC: 157 MG/DL (ref 70–110)
GLUCOSE BLD STRIP.AUTO-MCNC: 170 MG/DL (ref 70–110)
GLUCOSE BLD STRIP.AUTO-MCNC: 174 MG/DL (ref 70–110)
GLUCOSE BLD STRIP.AUTO-MCNC: 178 MG/DL (ref 70–110)
GLUCOSE BLD STRIP.AUTO-MCNC: 202 MG/DL (ref 70–110)
GLUCOSE BLD STRIP.AUTO-MCNC: 209 MG/DL (ref 70–110)
GLUCOSE BLD STRIP.AUTO-MCNC: 236 MG/DL (ref 70–110)
GLUCOSE BLD STRIP.AUTO-MCNC: 364 MG/DL (ref 70–110)
GLUCOSE SERPL-MCNC: 170 MG/DL (ref 74–99)
GLUCOSE, GLC: 122 MG/DL
GLUCOSE, GLC: 135 MG/DL
GLUCOSE, GLC: 137 MG/DL
GLUCOSE, GLC: 139 MG/DL
GLUCOSE, GLC: 144 MG/DL
GLUCOSE, GLC: 157 MG/DL
GLUCOSE, GLC: 170 MG/DL
GLUCOSE, GLC: 174 MG/DL
GLUCOSE, GLC: 178 MG/DL
GLUCOSE, GLC: 202 MG/DL
GLUCOSE, GLC: 209 MG/DL
HCT VFR BLD AUTO: 27.7 % (ref 35–45)
HGB BLD-MCNC: 9.5 G/DL (ref 12–16)
HIGH TARGET, HITG: 180 MG/DL
INR PPP: 2.5 (ref 0.8–1.2)
INSULIN ADMINSTERED, INSADM: 0.8 UNITS/HOUR
INSULIN ADMINSTERED, INSADM: 1.2 UNITS/HOUR
INSULIN ADMINSTERED, INSADM: 1.6 UNITS/HOUR
INSULIN ADMINSTERED, INSADM: 1.7 UNITS/HOUR
INSULIN ADMINSTERED, INSADM: 1.9 UNITS/HOUR
INSULIN ADMINSTERED, INSADM: 2.2 UNITS/HOUR
INSULIN ADMINSTERED, INSADM: 2.3 UNITS/HOUR
INSULIN ADMINSTERED, INSADM: 2.4 UNITS/HOUR
INSULIN ADMINSTERED, INSADM: 4.3 UNITS/HOUR
INSULIN ADMINSTERED, INSADM: 4.6 UNITS/HOUR
INSULIN ADMINSTERED, INSADM: 6 UNITS/HOUR
INSULIN ORDER, INSORD: 0.8 UNITS/HOUR
INSULIN ORDER, INSORD: 1.2 UNITS/HOUR
INSULIN ORDER, INSORD: 1.6 UNITS/HOUR
INSULIN ORDER, INSORD: 1.7 UNITS/HOUR
INSULIN ORDER, INSORD: 1.9 UNITS/HOUR
INSULIN ORDER, INSORD: 2.2 UNITS/HOUR
INSULIN ORDER, INSORD: 2.3 UNITS/HOUR
INSULIN ORDER, INSORD: 2.4 UNITS/HOUR
INSULIN ORDER, INSORD: 4.3 UNITS/HOUR
INSULIN ORDER, INSORD: 4.6 UNITS/HOUR
INSULIN ORDER, INSORD: 6 UNITS/HOUR
LOW TARGET, LOT: 140 MG/DL
LYMPHOCYTES # BLD AUTO: 3 % (ref 20–51)
LYMPHOCYTES # BLD: 0.5 K/UL (ref 0.8–3.5)
MCH RBC QN AUTO: 26.7 PG (ref 24–34)
MCHC RBC AUTO-ENTMCNC: 34.3 G/DL (ref 31–37)
MCV RBC AUTO: 77.8 FL (ref 74–97)
MINUTES UNTIL NEXT BG, NBG: 60 MIN
MONOCYTES # BLD: 0.9 K/UL (ref 0–1)
MONOCYTES NFR BLD AUTO: 6 % (ref 2–9)
MULTIPLIER, MUL: 0.01
MULTIPLIER, MUL: 0.02
MULTIPLIER, MUL: 0.03
MULTIPLIER, MUL: 0.03
MULTIPLIER, MUL: 0.04
MULTIPLIER, MUL: 0.04
NEUTS BAND NFR BLD MANUAL: 5 % (ref 0–5)
NEUTS SEG # BLD: 14 K/UL (ref 1.8–8)
NEUTS SEG NFR BLD AUTO: 86 % (ref 42–75)
ORDER INITIALS, ORDINIT: NORMAL
PLATELET # BLD AUTO: 293 K/UL (ref 135–420)
PLATELET COMMENTS,PCOM: ABNORMAL
PMV BLD AUTO: 10 FL (ref 9.2–11.8)
POTASSIUM SERPL-SCNC: 3.9 MMOL/L (ref 3.5–5.5)
PROT SERPL-MCNC: 6.9 G/DL (ref 6.4–8.2)
PROTHROMBIN TIME: 25.9 SEC (ref 11.5–15.2)
RBC # BLD AUTO: 3.56 M/UL (ref 4.2–5.3)
RBC MORPH BLD: ABNORMAL
SODIUM SERPL-SCNC: 135 MMOL/L (ref 136–145)
WBC # BLD AUTO: 15.4 K/UL (ref 4.6–13.2)

## 2017-03-31 RX ORDER — DEXTROSE MONOHYDRATE AND SODIUM CHLORIDE 5; .9 G/100ML; G/100ML
75 INJECTION, SOLUTION INTRAVENOUS CONTINUOUS
Status: DISCONTINUED | OUTPATIENT
Start: 2017-03-31 | End: 2017-03-31

## 2017-03-31 RX ORDER — OXYCODONE AND ACETAMINOPHEN 5; 325 MG/1; MG/1
1 TABLET ORAL
Status: DISCONTINUED | OUTPATIENT
Start: 2017-03-31 | End: 2017-04-06

## 2017-03-31 RX ORDER — PHYTONADIONE 10 MG/ML
5 INJECTION, EMULSION INTRAMUSCULAR; INTRAVENOUS; SUBCUTANEOUS ONCE
Status: COMPLETED | OUTPATIENT
Start: 2017-03-31 | End: 2017-03-31

## 2017-03-31 RX ORDER — INSULIN GLARGINE 100 [IU]/ML
20 INJECTION, SOLUTION SUBCUTANEOUS EVERY 12 HOURS
Status: DISCONTINUED | OUTPATIENT
Start: 2017-04-01 | End: 2017-04-01

## 2017-03-31 RX ORDER — INSULIN LISPRO 100 [IU]/ML
INJECTION, SOLUTION INTRAVENOUS; SUBCUTANEOUS
Status: DISCONTINUED | OUTPATIENT
Start: 2017-03-31 | End: 2017-04-06 | Stop reason: HOSPADM

## 2017-03-31 RX ORDER — DEXTROSE 50 % IN WATER (D50W) INTRAVENOUS SYRINGE
25-50 AS NEEDED
Status: DISCONTINUED | OUTPATIENT
Start: 2017-03-31 | End: 2017-04-06 | Stop reason: HOSPADM

## 2017-03-31 RX ORDER — INSULIN GLARGINE 100 [IU]/ML
20 INJECTION, SOLUTION SUBCUTANEOUS ONCE
Status: COMPLETED | OUTPATIENT
Start: 2017-03-31 | End: 2017-03-31

## 2017-03-31 RX ORDER — ACETAMINOPHEN 500 MG
500 TABLET ORAL
Status: DISCONTINUED | OUTPATIENT
Start: 2017-03-31 | End: 2017-04-06 | Stop reason: HOSPADM

## 2017-03-31 RX ORDER — MAGNESIUM SULFATE 100 %
16 CRYSTALS MISCELLANEOUS AS NEEDED
Status: DISCONTINUED | OUTPATIENT
Start: 2017-03-31 | End: 2017-04-06 | Stop reason: HOSPADM

## 2017-03-31 RX ORDER — CARVEDILOL 12.5 MG/1
12.5 TABLET ORAL 2 TIMES DAILY
Status: DISCONTINUED | OUTPATIENT
Start: 2017-04-01 | End: 2017-04-06 | Stop reason: HOSPADM

## 2017-03-31 RX ADMIN — GABAPENTIN 300 MG: 300 CAPSULE ORAL at 18:34

## 2017-03-31 RX ADMIN — AZTREONAM 1 G: 1 INJECTION, POWDER, LYOPHILIZED, FOR SOLUTION INTRAMUSCULAR; INTRAVENOUS at 12:33

## 2017-03-31 RX ADMIN — CARVEDILOL 25 MG: 25 TABLET, FILM COATED ORAL at 10:38

## 2017-03-31 RX ADMIN — INSULIN LISPRO 10 UNITS: 100 INJECTION, SOLUTION INTRAVENOUS; SUBCUTANEOUS at 22:00

## 2017-03-31 RX ADMIN — INSULIN LISPRO 4 UNITS: 100 INJECTION, SOLUTION INTRAVENOUS; SUBCUTANEOUS at 17:41

## 2017-03-31 RX ADMIN — HYDROMORPHONE HYDROCHLORIDE 1 MG: 1 INJECTION, SOLUTION INTRAMUSCULAR; INTRAVENOUS; SUBCUTANEOUS at 20:15

## 2017-03-31 RX ADMIN — PHYTONADIONE 5 MG: 10 INJECTION, EMULSION INTRAMUSCULAR; INTRAVENOUS; SUBCUTANEOUS at 10:49

## 2017-03-31 RX ADMIN — HYDROMORPHONE HYDROCHLORIDE 1 MG: 1 INJECTION, SOLUTION INTRAMUSCULAR; INTRAVENOUS; SUBCUTANEOUS at 03:05

## 2017-03-31 RX ADMIN — GABAPENTIN 300 MG: 300 CAPSULE ORAL at 10:38

## 2017-03-31 RX ADMIN — ALLOPURINOL 100 MG: 100 TABLET ORAL at 18:33

## 2017-03-31 RX ADMIN — INSULIN GLARGINE 20 UNITS: 100 INJECTION, SOLUTION SUBCUTANEOUS at 18:40

## 2017-03-31 RX ADMIN — ALLOPURINOL 100 MG: 100 TABLET ORAL at 10:38

## 2017-03-31 RX ADMIN — HYDROMORPHONE HYDROCHLORIDE 1 MG: 1 INJECTION, SOLUTION INTRAMUSCULAR; INTRAVENOUS; SUBCUTANEOUS at 13:58

## 2017-03-31 RX ADMIN — OXYCODONE HYDROCHLORIDE AND ACETAMINOPHEN 1 TABLET: 5; 325 TABLET ORAL at 18:34

## 2017-03-31 RX ADMIN — SODIUM CHLORIDE 4.6 UNITS/HR: 900 INJECTION, SOLUTION INTRAVENOUS at 11:18

## 2017-03-31 RX ADMIN — OXYCODONE HYDROCHLORIDE AND ACETAMINOPHEN 2 TABLET: 5; 325 TABLET ORAL at 06:12

## 2017-03-31 RX ADMIN — AZTREONAM 1 G: 1 INJECTION, POWDER, LYOPHILIZED, FOR SOLUTION INTRAMUSCULAR; INTRAVENOUS at 04:53

## 2017-03-31 RX ADMIN — DEXTROSE MONOHYDRATE AND SODIUM CHLORIDE 75 ML/HR: 5; .9 INJECTION, SOLUTION INTRAVENOUS at 06:22

## 2017-03-31 RX ADMIN — AZTREONAM 1 G: 1 INJECTION, POWDER, LYOPHILIZED, FOR SOLUTION INTRAMUSCULAR; INTRAVENOUS at 20:24

## 2017-03-31 RX ADMIN — HYDROMORPHONE HYDROCHLORIDE 1 MG: 1 INJECTION, SOLUTION INTRAMUSCULAR; INTRAVENOUS; SUBCUTANEOUS at 23:52

## 2017-03-31 RX ADMIN — DIPHENHYDRAMINE HYDROCHLORIDE 12.5 MG: 50 INJECTION, SOLUTION INTRAMUSCULAR; INTRAVENOUS at 18:38

## 2017-03-31 NOTE — ED NOTES
Hourly rounding-Pt resting on stretcher, side rails up, call bell in reach, vitals stable, pt updated on plan of care, no issues or complaints at this time. Pt resting fine, when aroused pt roans as if in pain.

## 2017-03-31 NOTE — PROGRESS NOTES
NUTRITION    BPA/MST Referral       RECOMMENDATIONS / PLAN:     - Start po diet when medically appropriate  - Update food allergy in chart  - Continue RD inpatient monitoring and evaluation. NUTRITION INTERVENTIONS & DIAGNOSIS:     [x] Meals/Snacks: start po diet when medically appropriate    Nutrition Diagnosis: Inadequate energy intake related to decreased appetite as evidenced by poor/variable meal intake PTA    Patient meets the AND/ASPEN criteria for Acute Severe Protein Calorie Malnutrition as evidenced by:   Nutritional intake of <50% of recommended intake for >5 days   Weight loss of >1-2% in 1 week, >5% in 1 month, >7.5% in 3 months, or >10% in 6 months     ASSESSMENT:     Subjective/Objective:  Patient reported having poor appetite and poor/variable meal intake x 1-2 weeks PTA. Gets full quickly after starting to eat, which is not normal for her. C/o of abdominal pain    Average po intake adequate to meet patients estimated nutritional needs:   [] Yes     [x] No   [] Unable to determine at this time    Diet: DIET NPO Except Meds      Food Allergies: blueberries  Current Appetite:   [] Good     [] Fair     [x] Poor     [] Other:  Appetite/meal intake prior to admission:   [] Good     [] Fair     [x] Poor (x 1-2 weeks PTA)     [] Other:  Feeding Limitations:  [] Swallowing difficulty    [] Chewing difficulty    [] Other:  Current Meal Intake: No data found.       BM:  3/29  Skin Integrity:  No pressure ulcer or wound  Edema:  None   Pertinent Medications: Reviewed    Recent Labs      03/31/17   0500  03/30/17   1126   NA  135*  130*   K  3.9  4.5   CL  97*  91*   CO2  30  28   GLU  170*  414*   BUN  41*  36*   CREA  1.88*  1.78*   CA  9.3  9.5   ALB  2.3*  2.6*   SGOT  31  15   ALT  36  35     No intake or output data in the 24 hours ending 03/31/17 1201    Anthropometrics:  Ht Readings from Last 1 Encounters:   03/14/17 5' 7\" (1.702 m)     Last 3 Recorded Weights in this Encounter    03/30/17 1156 Weight: 107.5 kg (237 lb)     Body mass index is 37.12 kg/(m^2). Weight History:  Patient reported usual body weight is 240 lbs. Stated felt that she had lost weight PTA; clothes fit looser. Noted weight loss of 8 lbs (5.5%) in past 2-4 weeks PTA per chart history    Weight Metrics 3/30/2017 3/16/2017 3/14/2017 3/13/2017 2/9/2017 1/12/2017 11/1/2016   Weight 237 lb 240 lb 243 lb 245 lb 245 lb 245 lb 244 lb   BMI 37.12 kg/m2 37.59 kg/m2 38.06 kg/m2 38.37 kg/m2 38.37 kg/m2 38.37 kg/m2 38.22 kg/m2        Admitting Diagnosis: Iliopsoas muscle hematoma, right, initial encounter  Pyelonephritis  Psoas hematoma, right, secondary to anticoagulant therapy, sequela  Past Medical History:   Diagnosis Date    Acute venous embolism and thrombosis of deep veins of upper extremity ([facility] 9/5/2012    Anemia in chronic kidney disease(285.21) 9/5/2012    Anticoagulated on Coumadin 9/5/2012    Biventricular implantable cardiac defibrillator in situ 4/28/05    upgrade to BiV AICD, gen change 4/08, pocket revision 10/09    Cardiac cath 08/15/1996    Patent coronaries. Elev LVEDP. EF 50-55%.  Cardiac echocardiogram 06/23/2015    Ltd study. EF 45-50%. Mild, diffuse hypk. Severe apical hypk. No mass or thrombus was clearly identified, although imaging was suboptimal.      Cardiac nuclear imaging test 06/19/2015    Fixed distal apical, distal septal defect more likely due to RV pacing than prior infarct. No ischemia. EF 46%. RWMA c/w RV pacing. Nondiagnostic EKG on pharm stress test.      Cardiovascular lower extremity venous duplex 09/04/2012    Acute, non-occlusive DVT in CFV on right. No DVT on left. No superficial thrombosis bilaterally.  Cardiovascular upper extremity venous duplex 08/27/2012    DVT in axillary vein on left. Left subclavian was not visualized.     CHF (congestive heart failure) (HCC)     Chronic kidney disease, stage IV (severe) (HCC) 9/5/2012    Congestive heart failure, unspecified     Diabetes ([facility]     Diabetic peripheral neuropathy associated with type 2 diabetes mellitus ([facility] 6/28/2011    Difficult airway for intubation 08/22/2012    see anesthesia airway note    Generalized weakness 9/5/2012    Hypercholesterolemia     Hypertension     LBBB (left bundle branch block)     Nonischemic cardiomyopathy     Obesity (BMI 35.0-39.9 without comorbidity) ([facility] 3/13/2017    Pacemaker 12/04    status post DDD permanent pacemaker    Peripheral neuropathy ([facility]     secondary due to diabetes    Postoperative anemia due to acute blood loss 9/5/2012    Type II or unspecified type diabetes mellitus with neurological manifestations, not stated as uncontrolled 6/28/2011    Unspecified essential hypertension 9/5/2012       Education Needs:        [x] None identified  [] Identified - Not appropriate at this time  []  Identified and addressed - refer to education log  Learning Limitations:   [x] None identified  [] Identified    Cultural, Bahai & ethnic food preferences:  [x] None identified    [] Identified and addressed     ESTIMATED NUTRITION NEEDS:     Calories: 5753-6545 kcal (MSJx1.2-1.3) based on  [x] Actual BW: 108 kg      [] IBW   CHO: 248-269 gm (50% kcal)   Protein:  gm (0.8-1 gm/kg) based on  [x] Actual BW      [] IBW   Fluid: 1 mL/kcal     MONITORING & EVALUATION:     Nutrition Goal(s):   1. Po intake of meals will meet >75% of patient estimated nutritional needs within the next 7 days.   Outcome:  [] Met/Ongoing    []  Not Met    [x] New/Initial Goal     Monitoring:   [x] Diet tolerance   [x] Meal intake   [] Supplement intake   [] GI symptoms/ability to tolerate po diet   [] Respiratory status   [] Plan of care      Previous Recommendations (for follow-up assessments only):     []   Implemented       []   Not Implemented (RD to address)     [] No Recommendation Made     Discharge Planning:  Diabetic, cardiac diet  [x] Participated in care planning, discharge planning, & interdisciplinary rounds as appropriate      Mercedes Carlos, [address] [place] [address]   Pager: [phone]

## 2017-03-31 NOTE — ED NOTES
Received c/o pt. From [facility]. Pt. Maricarmen Morales lower back pain that radiates around to abdomen; rates pain a 9/10. Medicated w/Dilaudid 1 mg IVP. Warm blankets also given for comfort.

## 2017-03-31 NOTE — ED NOTES
Bedside and Verbal shift change report given to Roro Edgar RN(oncoming nurse) by Aisha Salguero RN (offgoing nurse). Report included the following information SBAR, ED Summary and MAR.

## 2017-03-31 NOTE — ED NOTES
Pt. Given Healthy Choice meal along with jello and diet gingerale; daughter at the bedside assisting with feeding.

## 2017-03-31 NOTE — ED NOTES
Hourly rounding-Pt resting on stretcher, side rails up, call bell in reach, vitals stable, pt updated on plan of care, pt put back in bed from commode

## 2017-03-31 NOTE — CONSULTS
[facility] - Consult Note    Consultation request by Queen Shauna MD for advice/opinion related to evaluating Iliopsoas muscle hematoma, right, initial encounter  Pyelonephritis  Psoas hematoma, right, secondary to anticoagulant therapy, sequela    Date of  Admission: 3/30/2017 11:14 AM   Primary Care Physician:  Alyx Malloy MD     Assessment:     Patient Active Problem List   Diagnosis Code    Cardiomyopathy, nonischemic ([facility] I42.9    Complete heart block (HCC) I44.2    Biventricular implantable cardioverter-defibrillator in situ Z95.810    LBBB (left bundle branch block) I44.7    Type II or unspecified type diabetes mellitus with neurological manifestations, not stated as uncontrolled E11.49    Dyslipidemia E78.5    Diabetic peripheral neuropathy associated with type 2 diabetes mellitus ([facility] E11.42    HALI (obstructive sleep apnea) G47.33    Pain due to any device, implant or graft T85.848A    AICD generator infection ([facility] T82. 7XXA    Difficult airway for intubation T88. 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 1XXA     Iliopsoas and psoas hematoma secondary to fall while on Coumadin. INR 2.5 today, 4.3 yesterday.  Phytonadione 5mg given 3/30/17 at 2105 with favorable decrease in INR level. Pyleonephritis - treated with Cipro outpatient. IV abx added on for SIRS. Chronic dilated nonischemic cardiomyopathy - appears stable. TTE 6/23/15 showed EF 45-50%, mild diffuse hypokinesis, severe hypokinesis of the apical wall. [facility] cardiac stress with Lexiscan 2/84/61 showed systolic function was mildly depressed with EF 46% with apical and septal was dyskinesis likely from RV pacing. Biventricular pacemaker in LUQ - Device interrogation on 12/29/16 showed iImpedance and threshold within normal limits. No atrial high rate episodes, 1 NSVT episode lasting 1 second. A paced 20% V paced 100% Estimated battery longevity 1.5 years. Dyslipidemia - per Dr. Corey Dudley office note, patient only able to tolerate Pravastatin. Continue with pravastatin 40mg. DM type II - poorly controlled, on insulin. Hemoglobin A1c 8.0. HTN - carvedilol 25mg bid. Anemia - hgb 9.5, hct 27.7 today, slightly down from yesterday at 11.4 and 33. Continue to monitor as patient is anti-coagulated and with hematoma. Chronic coumadin use since 2012 when she developed upper extremity DVT    CKD, stage III - BUN 41, Crt 1.88, GFR 32 today. Renal function seems stable compared to baseline. Obesity    Cardiologist is Dr. Jazmin Camacho. Plan:     -Continue to hold Coumadin in the setting of iliopsoas muscle hematoma. Continue with INR checks and FFP/ Vitamin K if supratherapeutic. On Coumadin for previous upper extremity DVT, likely related to device which has been explanted. -Pacemaker check today with normal function.  -I would not restart Coumadin for now and I will follow-up on Monday if still inpatient. History of Present Illness: This is a 77 y.o. female admitted for Iliopsoas muscle hematoma, right, initial encounter  Pyelonephritis  Psoas hematoma, right, secondary to anticoagulant therapy, sequela.     Patient complains of: Worsening right hip and bilateral abdominal muscle soreness s/p slip and fall 2 days ago at home. Patient states she believes was trying to turn off a wall unit when she lost her balance and fell, but she is unsure if any symptoms preceded fall. She struck her chin but no head strike or LOC. Patient is on Coumadin and reports she is compliant with INR checks on her home device and speaks with the clinic/adjusts medications if it is out of range. Patient also reports she was diagnosed with pyelonephritis roughly 2 weeks ago and is being treated with Cipro. She denies any recent chest pain, palpitations, worsening of her chronic baseline SOB, orthopnea, syncope. Cardiac risk factors: dyslipidemia, diabetes mellitus, obesity, hypertension    Review of Symptoms:  Except as stated above include:  Constitutional:  negative  Respiratory:  negative  Cardiovascular:  negative  Gastrointestinal: negative  Genitourinary:  See HPI. Musculoskeletal:  See HPI. Neurological:  Negative  Dermatological:  Negative  Endocrinological: Negative  Psychological:  Negative    Pertinent items are noted in HPI. Past Medical History:     Past Medical History:   Diagnosis Date    Acute venous embolism and thrombosis of deep veins of upper extremity ([facility] [address] 9/5/2012    Anemia in chronic kidney disease(285.21) 9/5/2012    Anticoagulated on Coumadin 9/5/2012    Biventricular implantable cardiac defibrillator in situ 4/28/05    upgrade to BiV AICD, gen change 4/08, pocket revision 10/09    Cardiac cath 08/15/1996    Patent coronaries. Elev LVEDP. EF 50-55%.  Cardiac echocardiogram 06/23/2015    Ltd study. EF 45-50%. Mild, diffuse hypk. Severe apical hypk. No mass or thrombus was clearly identified, although imaging was suboptimal.      Cardiac nuclear imaging test 06/19/2015    Fixed distal apical, distal septal defect more likely due to RV pacing than prior infarct. No ischemia. EF 46%. RWMA c/w RV pacing. Nondiagnostic EKG on pharm stress test.      Cardiovascular lower extremity venous duplex 09/04/2012    Acute, non-occlusive DVT in CFV on right. No DVT on left. No superficial thrombosis bilaterally.  Cardiovascular upper extremity venous duplex 08/27/2012    DVT in axillary vein on left. Left subclavian was not visualized.  CHF (congestive heart failure) (HCC)     Chronic kidney disease, stage IV (severe) ([facility] 9/5/2012    Congestive heart failure, unspecified     Diabetes ([facility]     Diabetic peripheral neuropathy associated with type 2 diabetes mellitus ([facility] 6/28/2011    Difficult airway for intubation 08/22/2012    see anesthesia airway note    Generalized weakness 9/5/2012    Hypercholesterolemia     Hypertension     LBBB (left bundle branch block)     Nonischemic cardiomyopathy     Obesity (BMI 35.0-39.9 without comorbidity) ([facility] 3/13/2017    Pacemaker 12/04    status post DDD permanent pacemaker    Peripheral neuropathy ([facility]     secondary due to diabetes    Postoperative anemia due to acute blood loss 9/5/2012    Type II or unspecified type diabetes mellitus with neurological manifestations, not stated as uncontrolled 6/28/2011    Unspecified essential hypertension 9/5/2012         Social History:     Social History     Social History    Marital status:      Spouse name: N/A    Number of children: N/A    Years of education: N/A     Social History Main Topics    Smoking status: Never Smoker    Smokeless tobacco: Never Used    Alcohol use No    Drug use: No    Sexual activity: Yes     Partners: Male     Other Topics Concern    None     Social History Narrative        Family History:     Family History   Problem Relation Age of Onset    Cancer Father         Medications:      Allergies   Allergen Reactions    Vancomycin Itching    Ampicillin Itching    Bactrim [Sulfamethoxazole-Trimethoprim] Unknown (comments)    Ciprofloxacin Itching    Codeine Other (comments) Jumpy feeling    Crestor [Rosuvastatin] Itching    Darvocet A500 [Propoxyphene N-Acetaminophen] Itching    Demerol [Meperidine] Itching    Levaquin [Levofloxacin] Itching    Lipitor [Atorvastatin] Myalgia    Magnesium Oxide Itching     nausea    Minocin [Minocycline] Unknown (comments)    Pcn [Penicillins] Itching    Pravachol [Pravastatin] Swelling     Swelling in mouth     Sulfa (Sulfonamide Antibiotics) Itching    Ultracet [Tramadol-Acetaminophen] Itching    Vicodin [Hydrocodone-Acetaminophen] Unknown (comments)    Vytorin 10-10 [Ezetimibe-Simvastatin] Myalgia    Percodan [Oxycodone Hcl-Oxycodone-Asa] Itching        Current Facility-Administered Medications   Medication Dose Route Frequency    dextrose 5% and 0.9% NaCl infusion  75 mL/hr IntraVENous CONTINUOUS    insulin regular (NOVOLIN R, HUMULIN R) 100 Units in 0.9% sodium chloride 100 mL infusion  0-50 Units/hr IntraVENous TITRATE    glucose chewable tablet 16 g  4 Tab Oral PRN    glucagon (GLUCAGEN) injection 1 mg  1 mg IntraMUSCular PRN    dextrose (D50W) injection syrg 12.5-25 g  25-50 mL IntraVENous PRN    allopurinol (ZYLOPRIM) tablet 100 mg  100 mg Oral BID    carvedilol (COREG) tablet 25 mg  25 mg Oral BID    gabapentin (NEURONTIN) capsule 300 mg  300 mg Oral BID    oxyCODONE-acetaminophen (PERCOCET) 5-325 mg per tablet 2 Tab  2 Tab Oral Q4H PRN    aztreonam (AZACTAM) 1 g in 0.9% sodium chloride (MBP/ADV) 100 mL MBP  1 g IntraVENous Q8H    acetaminophen (TYLENOL) tablet 650 mg  650 mg Oral Q4H PRN    HYDROmorphone (PF) (DILAUDID) injection 1 mg  1 mg IntraVENous Q4H PRN    diphenhydrAMINE (BENADRYL) injection 12.5 mg  12.5 mg IntraVENous Q4H PRN    ondansetron (ZOFRAN) injection 4 mg  4 mg IntraVENous Q6H PRN    0.9% sodium chloride infusion 250 mL  250 mL IntraVENous PRN     Current Outpatient Prescriptions   Medication Sig    oxyCODONE-acetaminophen (PERCOCET) 5-325 mg per tablet Take 1 Tab by mouth every four (4) hours as needed for Pain. Max Daily Amount: 6 Tabs.  ondansetron (ZOFRAN ODT) 4 mg disintegrating tablet Take 1 Tab by mouth every eight (8) hours as needed for Nausea.  warfarin (COUMADIN) 2 mg tablet TAKE 3 TABLETS DAILY OR AS DIRECTED BY PRESCRIBER'S OFFICE    bumetanide (BUMEX) 1 mg tablet TAKE 1 TABLET TWICE A DAY    carvedilol (COREG) 25 mg tablet Take 1 Tab by mouth two (2) times a day.  pravastatin (PRAVACHOL) 40 mg tablet Take 40 mg by mouth nightly.  cyclobenzaprine (FLEXERIL) 10 mg tablet Take 10 mg by mouth as needed.  warfarin (COUMADIN) 2 mg tablet Take 3 tablets by mouth daily or as directed by our office.  OTHER Take 2 Tabs by mouth two (2) times a day. NeuRx-TF - total formulation for peripheral nerve health    potassium chloride (KLOR-CON M20) 20 mEq tablet Take 2 Tabs by mouth two (2) times a day. Or as directed    gabapentin (NEURONTIN) 300 mg tablet Take 300 mg by mouth two (2) times daily as needed.  insulin glargine (LANTUS) 100 unit/mL injection 20 Units by SubCUTAneous route two (2) times a day.  allopurinol (ZYLOPRIM) 100 mg tablet Take 100 mg by mouth two (2) times a day.  sitaGLIPtin (JANUVIA) 50 mg tablet Take 1 Tab by mouth daily.  ferrous sulfate 325 mg (65 mg iron) tablet Take 1 Tab by mouth two (2) times daily (with meals).  insulin aspart (NOVOLOG) 100 unit/mL injection INITIATE INSULIN CORRECTIVE PROTOCOL (HR): Normal Insulin Sensitivity  For Blood Sugar (mg/dL) of:    Less than 150 =   0 units          150 -199 =   2 units 200 -249 =   4 units 250 -299 =   6 units 300 -349 =   8 units 350 and above =   10 units If 2 glucose readings are above 200 mg/dL    Cholecalciferol, Vitamin D3, (VITAMIN D) 5,000 unit Tab Take 1 Tab by mouth daily.          Physical Exam:     Visit Vitals    /50    Pulse 78    Temp 99 °F (37.2 °C)    Resp 19    Wt 107.5 kg (237 lb)    SpO2 96%    BMI 37.12 kg/m2     BP Readings from Last 3 Encounters: 03/31/17 118/50   03/16/17 142/65   03/14/17 (P) 116/51     Pulse Readings from Last 3 Encounters:   03/31/17 78   03/16/17 90   03/14/17 (P) 74     Wt Readings from Last 3 Encounters:   03/30/17 107.5 kg (237 lb)   03/16/17 108.9 kg (240 lb)   03/14/17 110.2 kg (243 lb)       General:  alert, cooperative, no distress, appears stated age  Neck:  no carotid bruit, no JVD  Lungs:  clear to auscultation bilaterally in the anterior lung fields  Heart:  regular rate and rhythm, Grade II/VI systolic ejection murmur heard at the left upper sternal border. Abdomen:  abdomen is soft without significant tenderness, masses, organomegaly or guarding  Extremities:  extremities normal, atraumatic, no cyanosis or edema. Skin: Warm and dry.  no hyperpigmentation, vitiligo, or suspicious lesions  Neuro: alert, oriented x3, affect appropriate, no focal neurological deficits, moves all extremities well, no involuntary movements  Psych: non focal     Data Review:     Recent Labs      03/31/17   0500  03/30/17   1126   WBC  15.4*  19.0*   HGB  9.5*  11.4*   HCT  27.7*  33.0*   PLT  293  324     Recent Labs      03/31/17   0500  03/30/17   1735  03/30/17   1126   NA  135*   --   130*   K  3.9   --   4.5   CL  97*   --   91*   CO2  30   --   28   GLU  170*   --   414*   BUN  41*   --   36*   CREA  1.88*   --   1.78*   CA  9.3   --   9.5   ALB  2.3*   --   2.6*   SGOT  31   --   15   ALT  36   --   35   INR  2.5*  4.3*   --        Results for orders placed or performed during the hospital encounter of 03/30/17   EKG, 12 LEAD, INITIAL   Result Value Ref Range    Ventricular Rate 76 BPM    Atrial Rate 76 BPM    P-R Interval 114 ms    QRS Duration 172 ms    Q-T Interval 446 ms    QTC Calculation (Bezet) 501 ms    Calculated P Axis 53 degrees    Calculated R Axis 93 degrees    Calculated T Axis 95 degrees    Diagnosis       Electronic ventricular pacemaker  When compared with ECG of 19-JUN-2015 10:32,  No significant change was found  Confirmed by Elizabeth Black ([id number]) on 3/30/2017 7:44:21 PM     Results for orders placed or performed in visit on 11/01/16   AMB POC EKG ROUTINE W/ 12 LEADS, INTER & REP    Narrative    AV sequentially paced rhythm with a rate of 67. Results for orders placed or performed in visit on 08/07/14   PACEMAKER CHECK    Impression    Bi-V paced - 100%; A-sensed - 74%; Lead impedances and threshold  WNL; No events       All Cardiac Markers in the last 24 hours:    Lab Results   Component Value Date/Time    CPK 81 03/30/2017 11:26 AM    CKMB 2.0 03/30/2017 11:26 AM    CKND1 2.5 03/30/2017 11:26 AM    TROIQ <0.02 03/30/2017 11:26 AM       Last Lipid:    Lab Results   Component Value Date/Time    Cholesterol, total 154 07/24/2012 01:00 AM    HDL Cholesterol 48 07/24/2012 01:00 AM    LDL, calculated 90.4 07/24/2012 01:00 AM    Triglyceride 78 07/24/2012 01:00 AM    CHOL/HDL Ratio 3.2 07/24/2012 01:00 AM       Signed By: Feliciano CISNEROS    March 31, 2017      I saw, examined, and evaluated the patient. I personally reviewed the patient's labs, tests, vitals, orders, medications, updated history, and other providers assessments. I personally agree with the findings as stated and the plan as documented.

## 2017-03-31 NOTE — ED NOTES
Hourly rounding-Pt resting on stretcher, side rails up, call bell in reach, vitals stable, pt updated on plan of care, no issues or complaints at this time.  Pt sleeping, states her pain has improved, pt upto bedside commode with assistance, pt weak but able to ambulate short distance, dyspnic with exertion but maintained SPO2 above 95%

## 2017-03-31 NOTE — H&P
Hospitalist Admission History and Physical    NAME:  Alexia Hassan   :   1950   MRN:   [id number]     PCP:  Conner Martinez MD  Date/Time:  3/30/2017 8:09 PM  Subjective:   CHIEF COMPLAINT:  abd pain. HISTORY OF PRESENT ILLNESS:     Bautista Moon is a 77 y.o.  female with h/o CKD 3, Poorly controled DM 2 , CM, CHF came in with right sided abd pain. Pt was tx with cipro for Pyelonephritis . She had a fall few days ago and her pain got worse. Pt is on Coumadin as well. She was seen in the ED today and had abd ct; Which showed;     1. Asymmetrically enlarged right iliopsoas muscle compared to the left extending  to the right groin region with evidence of intramuscular hematoma in different  stage. This is new compared to the recent CT 3/16/17. Mild fatty stranding noted  in the right pericolonic gutter and the right groin. No additional hematoma or  other significant injury seen.     2. Left renal cysts. Small hypodense and hyperdense nodules at the upper pole of  atrophic right kidney, unchanged since the prior.     3. Mild splenomegaly with nonspecific linear calcifications laterally and  anteriorly.     4. No hip fracture or lumbar compression fracture seen. Mild curvature to the  left in upper lumbar spine could be positional or mild scoliosis.     Her WBC was 19 and UA was not very impressive. Her BS was over 400. She was started on Insulin. Her INR was 4.3. She is getting admitted for further tx. Past Medical History:   Diagnosis Date    Acute venous embolism and thrombosis of deep veins of upper extremity ([address] 2012    Anemia in chronic kidney disease(285.21) 2012    Anticoagulated on Coumadin 2012    Biventricular implantable cardiac defibrillator in situ 05    upgrade to BiV AICD, gen change , pocket revision 10/09    Cardiac cath 08/15/1996    Patent coronaries. Elev LVEDP. EF 50-55%.  Cardiac echocardiogram 2015    Ltd study. EF 45-50%. Mild, diffuse hypk. Severe apical hypk. No mass or thrombus was clearly identified, although imaging was suboptimal.      Cardiac nuclear imaging test 06/19/2015    Fixed distal apical, distal septal defect more likely due to RV pacing than prior infarct. No ischemia. EF 46%. RWMA c/w RV pacing. Nondiagnostic EKG on pharm stress test.      Cardiovascular lower extremity venous duplex 09/04/2012    Acute, non-occlusive DVT in CFV on right. No DVT on left. No superficial thrombosis bilaterally.  Cardiovascular upper extremity venous duplex 08/27/2012    DVT in axillary vein on left. Left subclavian was not visualized.     CHF (congestive heart failure) (HCC)     Chronic kidney disease, stage IV (severe) ([address] 9/5/2012    Congestive heart failure, unspecified     Diabetes ([address]     Diabetic peripheral neuropathy associated with type 2 diabetes mellitus ([address] 6/28/2011    Difficult airway for intubation 08/22/2012    see anesthesia airway note    Generalized weakness 9/5/2012    Hypercholesterolemia     Hypertension     LBBB (left bundle branch block)     Nonischemic cardiomyopathy     Obesity (BMI 35.0-39.9 without comorbidity) ([address] 3/13/2017    Pacemaker 12/04    status post DDD permanent pacemaker    Peripheral neuropathy ([address]     secondary due to diabetes    Postoperative anemia due to acute blood loss 9/5/2012    Type II or unspecified type diabetes mellitus with neurological manifestations, not stated as uncontrolled 6/28/2011    Unspecified essential hypertension 9/5/2012        Past Surgical History:   Procedure Laterality Date    HX CARPAL TUNNEL RELEASE  4/07    right     HX CHOLECYSTECTOMY  1994    HX HYSTERECTOMY  1973    HX OTHER SURGICAL  6/11/2012    AICD revision    HX PACEMAKER  4/28/2005    [employer] AICD       Social History   Substance Use Topics    Smoking status: Never Smoker    Smokeless tobacco: Never Used    Alcohol use No        Family History   Problem Relation Age of Onset    Cancer Father         Allergies   Allergen Reactions    Vancomycin Itching    Ampicillin Itching    Bactrim [Sulfamethoxazole-Trimethoprim] Unknown (comments)    Ciprofloxacin Itching    Codeine Other (comments)     Jumpy feeling    Crestor [Rosuvastatin] Itching    Darvocet A500 [Propoxyphene N-Acetaminophen] Itching    Demerol [Meperidine] Itching    Levaquin [Levofloxacin] Itching    Lipitor [Atorvastatin] Myalgia    Magnesium Oxide Itching     nausea    Minocin [Minocycline] Unknown (comments)    Pcn [Penicillins] Itching    Pravachol [Pravastatin] Swelling     Swelling in mouth     Sulfa (Sulfonamide Antibiotics) Itching    Ultracet [Tramadol-Acetaminophen] Itching    Vicodin [Hydrocodone-Acetaminophen] Unknown (comments)    Vytorin 10-10 [Ezetimibe-Simvastatin] Myalgia    Percodan [Oxycodone Hcl-Oxycodone-Asa] Itching        Prior to Admission Medications   Prescriptions Last Dose Informant Patient Reported? Taking? Cholecalciferol, Vitamin D3, (VITAMIN D) 5,000 unit Tab   Yes No   Sig: Take 1 Tab by mouth daily. OTHER   Yes No   Sig: Take 2 Tabs by mouth two (2) times a day. NeuRx-TF - total formulation for peripheral nerve health   allopurinol (ZYLOPRIM) 100 mg tablet   Yes No   Sig: Take 100 mg by mouth two (2) times a day. bumetanide (BUMEX) 1 mg tablet   No No   Sig: TAKE 1 TABLET TWICE A DAY   carvedilol (COREG) 25 mg tablet   No No   Sig: Take 1 Tab by mouth two (2) times a day. cyclobenzaprine (FLEXERIL) 10 mg tablet   Yes No   Sig: Take 10 mg by mouth as needed. ferrous sulfate 325 mg (65 mg iron) tablet   No No   Sig: Take 1 Tab by mouth two (2) times daily (with meals). gabapentin (NEURONTIN) 300 mg tablet   Yes No   Sig: Take 300 mg by mouth two (2) times daily as needed.    insulin aspart (NOVOLOG) 100 unit/mL injection   No No   Sig: INITIATE INSULIN CORRECTIVE PROTOCOL (HR): Normal Insulin Sensitivity  For Blood Sugar (mg/dL) of:    Less than 150 =   0 units          150 -199 =   2 units 200 -249 =   4 units 250 -299 =   6 units 300 -349 =   8 units 350 and above =   10 units If 2 glucose readings are above 200 mg/dL   insulin glargine (LANTUS) 100 unit/mL injection   Yes No   Si Units by SubCUTAneous route two (2) times a day. ondansetron (ZOFRAN ODT) 4 mg disintegrating tablet   No No   Sig: Take 1 Tab by mouth every eight (8) hours as needed for Nausea. oxyCODONE-acetaminophen (PERCOCET) 5-325 mg per tablet   No No   Sig: Take 1 Tab by mouth every four (4) hours as needed for Pain. Max Daily Amount: 6 Tabs. potassium chloride (KLOR-CON M20) 20 mEq tablet   No No   Sig: Take 2 Tabs by mouth two (2) times a day. Or as directed   pravastatin (PRAVACHOL) 40 mg tablet   Yes No   Sig: Take 40 mg by mouth nightly. sitaGLIPtin (JANUVIA) 50 mg tablet   No No   Sig: Take 1 Tab by mouth daily. warfarin (COUMADIN) 2 mg tablet   No No   Sig: Take 3 tablets by mouth daily or as directed by our office.    warfarin (COUMADIN) 2 mg tablet   No No   Sig: TAKE 3 TABLETS DAILY OR AS DIRECTED BY PRESCRIBER'S OFFICE      Facility-Administered Medications: None       REVIEW OF SYSTEMS:     [] Unable to obtain  ROS due to  []mental status change  []sedated   []intubated   [x]Total of 12 systems reviewed as follows:  Constitutional:  negative fever, negative chills, negative weight loss    Eyes:   negative visual changes    ENT:   negative sore throat, tongue or lip swelling    Respiratory:  negative cough, negative dyspnea  Cards:   negative for chest pain, palpitations, lower extremity edema    GI:   C/o abdominal pain    Genitourinary: negative for frequency, dysuria  Integument:  negative for rash and pruritus  Hematologic:  negative for easy bruising and gum/nose bleeding  Musculoskel: C/o back pain  Neurological:  negative for headaches, dizziness, vertigo  Behavl/Psych:  negative for feelings of anxiety, depression     Pertinent Positives include :    Objective:   VITALS:    Visit Vitals    /78 (BP 1 Location: Right arm, BP Patient Position: At rest)    Pulse 99    Temp 99.7 °F (37.6 °C)    Resp 20    Wt 107.5 kg (237 lb)    SpO2 98%    BMI 37.12 kg/m2     Temp (24hrs), Av.3 °F (37.4 °C), Min:98.4 °F (36.9 °C), Max:99.9 °F (37.7 °C)      PHYSICAL EXAM:   General:    Distress from abd pain. Head:   Normocephalic, without obvious abnormality, atraumatic. Eyes:   Conjunctivae clear, anicteric sclerae. Pupils are equal  Nose:  Nares normal. No drainage or sinus tenderness. Throat:    Lips, mucosa, and tongue normal.  No Thrush  Neck:  Supple, symmetrical,  no adenopathy, thyroid: non tender    no carotid bruit and no JVD. Back:    Symmetric,  No CVA tenderness. Lungs:   Clear to auscultation bilaterally. No Wheezing or Rhonchi. No rales. Chest wall:  No tenderness or deformity. No Accessory muscle use. Heart:   Regular rate and rhythm,  no murmur, rub or gallop. Abdomen:   Soft, tender rlq. Extremities: Extremities normal, atraumatic, No cyanosis. No edema. No clubbing  Skin:     Texture, turgor normal. No rashes or lesions. Psych:  Good insight. Not depressed. Not anxious or agitated. Neurologic: EOMs intact. No facial asymmetry. No aphasia or slurred speech. Normal   strength, Alert and oriented X 3. LAB DATA REVIEWED:    No components found for: [place]  Recent Labs      17   1126   NA  130*   K  4.5   CL  91*   CO2  28   BUN  36*   CREA  1.78*   GLU  414*   CA  9.5   ALB  2.6*   WBC  19.0*   HGB  11.4*   HCT  33.0*   PLT  324     Ekg;   Diagnosis   Final      Electronic ventricular pacemaker   When compared with ECG of 2015 10:32,   No significant change was found   Confirmed by Gamaliel Aleman ([id number]) on 3/30/2017 7:44:21 PM              Assessment/Plan:        SIRS with Iliopsoas muscle hematoma (3/30/2017) right sided with pain; with high INR;  Give 2 units of FFP and give Vit K sq.  Hold coumadin. Consider surgery consult. Pyelonephritis (3/30/2017); her UA is not impressive. Await UC and continue Azactam for now. Cardiomyopathy, nonischemic ([facility] (6/28/2011); get cards eval left a message.       Biventricular implantable cardioverter-defibrillator in situ (6/28/2011)      Overview: Abdominal - done on 8/22/2012 by Dr. Dany Hunt      Diabetic peripheral neuropathy associated with type 2 diabetes mellitus ([facility] (6/28/2011) with uncontrol BS;   Continue Insulin drip for now.     ___________________________________________________  PLAN:    Risk of deterioration:  []Low    [x]Moderate  []High              Prophylaxis:  []Lovenox  []Coumadin  []Hep SQ  [x]SCDs  []H2B/PPI    Disposition:  [x]Home w/ Family   []HH PT,OT,RN   []SNF/LTC   []SAH/Rehab    Discussed Code Status:    [x]Full Code      []DNR     ___________________________________________________    Care Plan discussed with:    [x]Patient   [x]Family    []ED Care Manager  []ED Doc   []Specialist :    Total Time Coordinating Admission:      minutes    []Total Critical Care Time:     ___________________________________________________  Admitting Physician: Monica Petersen MD

## 2017-03-31 NOTE — DIABETES MGMT
GLYCEMIC CONTROL PLAN OF CARE    Assessment:  Pt is 77 yr old female admitted on 3/30/17 with right sided abd pain. Pt with past medical history significant for T2DM, peripheral neuropathy, cardiomyopathy, HALI, AICD, CHF, CKD S3.    Recommendations:  Pt last anion gap from 0500 3/31/17 was 8 and CO2 30. When pt is ready to transition off of glucostablizer recommend begin 10 units of lantus 2 hours before pt is ready to be taken off of glucostablizer. Once off of glucostablier recommend place pt on correctional lispro ACHS- normal insulin sensitivity scale. Diabetic education. Will continue to monitor inpatient for intervention. Most recent blood glucose values:  Results for Mirella Pastrana (MRN [id number]) as of 3/31/2017 12:22   Ref. Range 3/31/2017 05:56 3/31/2017 08:50 3/31/2017 10:10 3/31/2017 11:16   GLUCOSE,FAST - POC Latest Ref Range: 70 - 110 mg/dL 144 (H) 202 (H) 209 (H) 174 (H)     Current A1C:  8% (3/30/17) equivalent  to ave Blood Glucose of 183mg/dl for 2-3 months prior to admission    Current hospital diabetes medications:  glucostablizer  Pt has D5NS running at 75ml/hr    Previous day Insulin TDD:  Pt received  21.7 units of regular via glucostablizer with last drip rate on 3/31/17 to be 4.6 units regular insulin/hr at 1118.     Diet:   NPO    Home diabetes medications:  Pt reports taking 20 units of lantus two times daily, novolog ssi with meals and 50 mg januvia with breakfast    Goals:  Pt BG will be within target range by _4/03/17____    Education:  _x__  Refer to Diabetes Education Record             ___  Education not indicated at this time    Julia Ivey [address], [address] [place] [address], [facility] [place]  Pager: [phone]

## 2017-03-31 NOTE — ED NOTES
Spoke with Dr Kristine Pena via telephone regarding pt's BS-137 and glucose stabilizer. Awaiting further orders.

## 2017-03-31 NOTE — ED NOTES
Bedside and Verbal shift change report given to Susan Dickens RN (oncoming nurse) by Jay Jay Gant RN (offgoing nurse). Report included the following information SBAR.

## 2017-03-31 NOTE — CONSULTS
INTERVENTIONAL RADIOLOGY CONSULT     March 31, 2017       3:19 PM     Assessment/Plan:  Linda Gonsalez is a 77 y.o. female for whom Interventional Radiology consultation has been requested. She has a right psoas hematoma. I don't believe she would benefit from draining this unless there are signs of superfinfection. In fact, I think percutaneous drainage may worsen her situation as she may be realizing benefit from a tamponade effect to resist further bleeding. Please contact IR if you feel further discussion is needed. Thank you for the opportunity to participate in Kusumshellykhushboo LEUNG Gonzalez's care.     Vital signs:   Visit Vitals    /51    Pulse 86    Temp 99 °F (37.2 °C)    Resp 25    Wt 107.5 kg (237 lb)    SpO2 96%    BMI 37.12 kg/m2        Laboratory data:   Metabolic profile:  Lab Results   Component Value Date/Time    Sodium 135 03/31/2017 05:00 AM    Potassium 3.9 03/31/2017 05:00 AM    Chloride 97 03/31/2017 05:00 AM    CO2 30 03/31/2017 05:00 AM    BUN 41 03/31/2017 05:00 AM    Creatinine 1.88 03/31/2017 05:00 AM    Glucose 170 03/31/2017 05:00 AM     Complete blood count:  Lab Results   Component Value Date/Time    WBC 15.4 03/31/2017 05:00 AM    HGB 9.5 03/31/2017 05:00 AM    HCT 27.7 03/31/2017 05:00 AM    PLATELET 011 75/23/9906 05:00 AM     Coagulation parameters:  Lab Results   Component Value Date/Time    Prothrombin time 25.9 03/31/2017 05:00 AM    INR 2.5 03/31/2017 05:00 AM    aPTT 47.9 03/30/2017 05:35 PM        Diet :   DIET NPO Except Meds     Problem list:   Patient Active Problem List    Diagnosis Date Noted    Pyelonephritis 03/30/2017    Iliopsoas muscle hematoma 03/30/2017    SIRS (systemic inflammatory response syndrome) (HCC) 03/30/2017    Psoas hematoma, right, secondary to anticoagulant therapy 03/30/2017    Obesity (BMI 35.0-39.9 without comorbidity) ([facility] 03/13/2017    CHF (congestive heart failure) (HCC)     Biventricular cardiac pacemaker in situ 03/02/2015    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    Unspecified essential hypertension 09/05/2012    Chronic kidney disease, stage IV (severe) (HCC) 09/05/2012    Anemia in chronic kidney disease(285.21) 09/05/2012    Anemia due to blood loss, acute 09/05/2012    Acute venous embolism and thrombosis of deep veins of upper extremity ([facility] 09/05/2012    Anticoagulated on Coumadin 09/05/2012    Status post PICC central line placement 09/05/2012    Generalized weakness 09/05/2012    Difficult airway for intubation 08/22/2012    AICD generator infection ([facility] 08/20/2012    Pain due to any device, implant or graft 05/24/2012    HALI (obstructive sleep apnea) 02/07/2012    Cardiomyopathy, nonischemic ([facility] 06/28/2011    Complete heart block (HCC) 06/28/2011    Biventricular implantable cardioverter-defibrillator in situ 06/28/2011    LBBB (left bundle branch block) 06/28/2011    Type II or unspecified type diabetes mellitus with neurological manifestations, not stated as uncontrolled 06/28/2011    Dyslipidemia 06/28/2011    Diabetic peripheral neuropathy associated with type 2 diabetes mellitus ([facility] 06/28/2011        Medications:   Current Facility-Administered Medications   Medication Dose Route Frequency Provider Last Rate Last Dose    dextrose 5% and 0.9% NaCl infusion  75 mL/hr IntraVENous CONTINUOUS Favian Julien MD 75 mL/hr at 03/31/17 0622 75 mL/hr at 03/31/17 0622    [START ON 4/1/2017] carvedilol (COREG) tablet 12.5 mg  12.5 mg Oral BID Domo Dale MD        insulin regular (NOVOLIN R, HUMULIN R) 100 Units in 0.9% sodium chloride 100 mL infusion  0-50 Units/hr IntraVENous TITRATE Brody Paras, DO 1.2 mL/hr at 03/31/17 1344 1.2 Units/hr at 03/31/17 1344    glucose chewable tablet 16 g  4 Tab Oral PRN Brody Paras, DO        glucagon (GLUCAGEN) injection 1 mg  1 mg IntraMUSCular PRN Brody Paras, DO        dextrose (D50W) injection syrg 12.5-25 g  25-50 mL IntraVENous PRN Christiana Phan DO [place]        allopurinol (ZYLOPRIM) tablet 100 mg  100 mg Oral BID Khushboo Kerns MD   100 mg at 03/31/17 1038    gabapentin (NEURONTIN) capsule 300 mg  300 mg Oral BID Khushboo Kerns MD   300 mg at 03/31/17 1038    oxyCODONE-acetaminophen (PERCOCET) 5-325 mg per tablet 2 Tab  2 Tab Oral Q4H PRN Khushboo Kerns MD   2 Tab at 03/31/17 0612    aztreonam (AZACTAM) 1 g in 0.9% sodium chloride (MBP/ADV) 100 mL MBP  1 g IntraVENous Jose Rowland MD   Stopped at 03/31/17 1346    acetaminophen (TYLENOL) tablet 650 mg  650 mg Oral Q4H PRN Khushboo Kerns MD        HYDROmorphone (PF) (DILAUDID) injection 1 mg  1 mg IntraVENous Q4H PRN Khushboo Kerns MD   1 mg at 03/31/17 1358    diphenhydrAMINE (BENADRYL) injection 12.5 mg  12.5 mg IntraVENous Q4H PRN Khushboo Kerns MD        ondansetron [facility]) injection 4 mg  4 mg IntraVENous Q6H PRN Khushboo Kerns MD        0.9% sodium chloride infusion 250 mL  250 mL IntraVENous PRN Khushboo Kerns MD         Current Outpatient Prescriptions   Medication Sig Dispense Refill    oxyCODONE-acetaminophen (PERCOCET) 5-325 mg per tablet Take 1 Tab by mouth every four (4) hours as needed for Pain. Max Daily Amount: 6 Tabs. 12 Tab 0    ondansetron (ZOFRAN ODT) 4 mg disintegrating tablet Take 1 Tab by mouth every eight (8) hours as needed for Nausea. 15 Tab 0    warfarin (COUMADIN) 2 mg tablet TAKE 3 TABLETS DAILY OR AS DIRECTED BY PRESCRIBER'S OFFICE 270 Tab 2    bumetanide (BUMEX) 1 mg tablet TAKE 1 TABLET TWICE A  Tab 2    carvedilol (COREG) 25 mg tablet Take 1 Tab by mouth two (2) times a day. 180 Tab 3    pravastatin (PRAVACHOL) 40 mg tablet Take 40 mg by mouth nightly.  cyclobenzaprine (FLEXERIL) 10 mg tablet Take 10 mg by mouth as needed.  warfarin (COUMADIN) 2 mg tablet Take 3 tablets by mouth daily or as directed by our office. 90 Tab 6    OTHER Take 2 Tabs by mouth two (2) times a day.  NeuRx-TF - total formulation for peripheral nerve health      potassium chloride (KLOR-CON M20) 20 mEq tablet Take 2 Tabs by mouth two (2) times a day. Or as directed 360 Tab 3    gabapentin (NEURONTIN) 300 mg tablet Take 300 mg by mouth two (2) times daily as needed.  insulin glargine (LANTUS) 100 unit/mL injection 20 Units by SubCUTAneous route two (2) times a day.  allopurinol (ZYLOPRIM) 100 mg tablet Take 100 mg by mouth two (2) times a day.  sitaGLIPtin (JANUVIA) 50 mg tablet Take 1 Tab by mouth daily. 15 Tab 0    ferrous sulfate 325 mg (65 mg iron) tablet Take 1 Tab by mouth two (2) times daily (with meals). 30 Tab 0    insulin aspart (NOVOLOG) 100 unit/mL injection INITIATE INSULIN CORRECTIVE PROTOCOL (HR): Normal Insulin Sensitivity  For Blood Sugar (mg/dL) of:    Less than 150 =   0 units          150 -199 =   2 units 200 -249 =   4 units 250 -299 =   6 units 300 -349 =   8 units 350 and above =   10 units If 2 glucose readings are above 200 mg/dL 10 mL 6    Cholecalciferol, Vitamin D3, (VITAMIN D) 5,000 unit Tab Take 1 Tab by mouth daily. Allergies:    Allergies   Allergen Reactions    Vancomycin Itching    Ampicillin Itching    Bactrim [Sulfamethoxazole-Trimethoprim] Unknown (comments)    Blueberry Swelling     Causes throat swelling    Ciprofloxacin Itching    Codeine Other (comments)     Jumpy feeling    Crestor [Rosuvastatin] Itching    Darvocet A500 [Propoxyphene N-Acetaminophen] Itching    Demerol [Meperidine] Itching    Levaquin [Levofloxacin] Itching    Lipitor [Atorvastatin] Myalgia    Magnesium Oxide Itching     nausea    Minocin [Minocycline] Unknown (comments)    Pcn [Penicillins] Itching    Pravachol [Pravastatin] Swelling     Swelling in mouth     Sulfa (Sulfonamide Antibiotics) Itching    Ultracet [Tramadol-Acetaminophen] Itching    Vicodin [Hydrocodone-Acetaminophen] Unknown (comments)    Vytorin 10-10 [Ezetimibe-Simvastatin] Myalgia    Percodan [Oxycodone Hcl-Oxycodone-Asa] Itching        Social/family history:   Social History     Social History    Marital status:      Spouse name: N/A    Number of children: N/A    Years of education: N/A     Occupational History    Not on file.      Social History Main Topics    Smoking status: Never Smoker    Smokeless tobacco: Never Used    Alcohol use No    Drug use: No    Sexual activity: Yes     Partners: Male     Other Topics Concern    Not on file     Social History Narrative     Family History   Problem Relation Age of Onset    Cancer Father         Thank you,  Maurice Woods MD

## 2017-03-31 NOTE — ED NOTES
0603-Pts blood sugar was entered into another pts glucose stabilizer in error at this time,     0605-error noted and corrected, pts blood sugar entered in correct glucose stabilizer    No pt safety issue, pts insulin drip rate remained the same, quantros will be filed.

## 2017-03-31 NOTE — DIABETES MGMT
Diabetes Patient/Family Education Record    Factors That  May Influence Patients Ability  to Learn or  Comply With  Recommendations:    []   Language barrier    []   Cultural needs   []   Motivation    []   Cognitive limitation    []   Physical   [x]   Education    []   Physiological factors   []   Hearing/vision/speaking impairment   []   Restorationism beliefs    []   Financial factors   []  Other:   []  No factors identified at this time.      Person Instructed:   [x]   Patient   [x]   Family    []  Other     Preference for Learning:   [x]   Verbal   [x]   Written   []  Demonstration     Level of Comprehension & Competence:    []  Good                                      [x] Fair                                     []  Poor                             [x]  Needs Reinforcement   [x]  Teachback completed    Education Component:   [x]  Medication management, pt reports taking 20 units lantus two times daily, 50 mg januvia in the morning and novolog ssi with meals   [x]  Nutritional management including the role of carbohydrate intake pt reports the smell of breakfast made her sick and she did not eat any of it   []  Exercise   [x]  Signs, symptoms, and treatment of hyperglycemia and hypoglycemia   [x] Treatment of hyperglycemia and hypoglycemia   [x]  Importance of blood glucose monitoring and how to obtain a blood glucose meter    []  Instruction on use of blood glucose meter   [x]  Discuss the importance of HbA1C monitoring and provide patient with  results   []  Sick day guidelines   []  Proper use and disposal of lancets, needles, syringes or insulin pens (if appropriate)   [x]  Potential long-term complications (retinopathy, kidney disease, neuropathy, heart disease, stroke, vascular disease, foot care)   [] Provide emergency contact number and contact number for more information    [x]  Goal:  Patient/family will demonstrate understanding of Diabetes Self Management Skills by: (date) _4/7/17______  Plan for post-discharge education or self-management support:    [x] Outpatient class schedule provided            [] Patient Declined    [] Scheduled for outpatient classes (date) _______        Mukesh Lopez MS, [address], [address]  Pager: [phone]

## 2017-03-31 NOTE — ROUTINE PROCESS
TRANSFER - OUT REPORT:    Verbal report given to Kristin Gonzales RN on Coy Lutzzen  being transferred to [address] [place] [address] for routine progression of care       Report consisted of patients Situation, Background, Assessment and   Recommendations(SBAR). Information from the following report(s) SBAR was reviewed with the receiving nurse. Lines:   Peripheral IV 03/30/17 Left Antecubital (Active)   Site Assessment Clean, dry, & intact 3/30/2017 12:55 PM   Phlebitis Assessment 0 3/30/2017 12:55 PM   Infiltration Assessment 0 3/30/2017 12:55 PM   Dressing Status Clean, dry, & intact 3/30/2017 12:55 PM   Dressing Type Tape;Transparent 3/30/2017 12:55 PM   Hub Color/Line Status Pink;Flushed;Patent 3/30/2017 12:55 PM   Action Taken Blood drawn 3/30/2017 12:55 PM       Peripheral IV 03/30/17 Right Wrist (Active)       Peripheral IV 03/30/17 Right Arm (Active)        Opportunity for questions and clarification was provided.       Patient transported with:   Monitor  O2 @ 2 liters  Registered Nurse

## 2017-03-31 NOTE — ED NOTES
Hourly rounding-Pt resting on stretcher, side rails up, call bell in reach, vitals stable, pt updated on plan of care, pt groaning in pain, requesting pain meds

## 2017-03-31 NOTE — ED NOTES
Assumed care of pt, [facility]. Pt alert, pale in color c/o 8/10 right sided abdominal/flank pain, pt will received FFP after labs and additional Iv, pt is a difficult stick. Currently awaiting stepdown bed, on insulin drip and maintanence fluids.      Pt states she had been sick with kidney infection, was on antibiotics, fell on Tuesday and started having abdominal pain

## 2017-03-31 NOTE — PROGRESS NOTES
Hospitalist Progress Note    Patient: Benjamin Obregon MRN: [id number]  CSN: [id number]    YOB: 1950  Age: 77 y.o. Sex: female    DOA: 3/30/2017 LOS:  LOS: 1 day          Got 2 units ffp and vitamin K yesterday. INR 2.5 today. She denies chest pain or shortness of breath. Still has pain to right side. Tolerating some clears. Assessment/Plan     1. Leukocytosis poa improving - likely reactive in the setting of Iliopsoas muscle hematoma. Continue abx for now; blood cx (3/30) ngtd and urine cx ngtd. 2. Chronic AC with coumadin  3. Pyelonephritis (3/30/2017); her UA is not impressive. Await UC and continue Azactam for now. 4. Multiple antibiotic allergies. 5. Cardiomyopathy, nonischemic - csi following. 6. S/p Biventricular implantable cardioverter-defibrillator - pacemaker check 3/31/17 with normal function per csi.  7. DM2 with hyperglycemia, complicated by peripheral neuropathy. A1c 8.0. Consult diabetes educator. Transition gtt --> lantus, ssi. 8. Anemia mc hc - check studies. 9. Hyponatremia better. 10. ckd 3 at baseline, in the setting of atrophic kidney  11. Coumadin coagulopathy with INR 4.3 upon admission. 15. Fall at home  13. Dyslipidemia - per Dr. Rena Hartmann office note, she only tolerates Pravastatin. 14. right iliopsoas muscle hematoma - hold coumadin, vit K again today. Per IR, no intervention, monitor for infection. 15. Obesity Body mass index is 37.12 kg/(m^2). 16. Avoid chemical dvt prophylaxis  17. Full code. Tele. Pt and ot once INR <2.     Additional Notes:      Case discussed with:  [x]Patient  []Family  []Nursing  []Case Management  DVT Prophylaxis:  []Lovenox  []Hep SQ  [x]SCDs  [x]Coumadin   []On Heparin gtt    Vital signs/Intake and Output:  Visit Vitals    /50    Pulse 78    Temp 99 °F (37.2 °C)    Resp 19    Wt 107.5 kg (237 lb)    SpO2 96%    BMI 37.12 kg/m2     Current Shift:     Last three shifts:       Awake alert and oriented. nad  Ncat. Perrl. RRR  cta b.l  Obese soft nt nd nabs  No edema. dp 2+ b.l  No focal deficit  No rash    Medications Reviewed      Labs: Results:       Chemistry Recent Labs      03/31/17   0500  03/30/17   1126   GLU  170*  414*   NA  135*  130*   K  3.9  4.5   CL  97*  91*   CO2  30  28   BUN  41*  36*   CREA  1.88*  1.78*   CA  9.3  9.5   AGAP  8  11   BUCR  22*  20   AP  120*  130*   TP  6.9  7.7   ALB  2.3*  2.6*   GLOB  4.6*  5.1*   AGRAT  0.5*  0.5*      CBC w/Diff Recent Labs      03/31/17   0500  03/30/17   1126   WBC  15.4*  19.0*   RBC  3.56*  4.23   HGB  9.5*  11.4*   HCT  27.7*  33.0*   PLT  293  324   GRANS  86*  83*   LYMPH  3*  6*   EOS  0  0      Cardiac Enzymes Recent Labs      03/30/17   1126   CPK  81   CKND1  2.5      Coagulation Recent Labs      03/31/17   0500  03/30/17   1735   PTP  25.9*  38.8*   INR  2.5*  4.3*   APTT   --   47.9*       Lipid Panel Lab Results   Component Value Date/Time    Cholesterol, total 154 07/24/2012 01:00 AM    HDL Cholesterol 48 07/24/2012 01:00 AM    LDL, calculated 90.4 07/24/2012 01:00 AM    VLDL, calculated 15.6 07/24/2012 01:00 AM    Triglyceride 78 07/24/2012 01:00 AM    CHOL/HDL Ratio 3.2 07/24/2012 01:00 AM      BNP No results for input(s): BNPP in the last 72 hours. Liver Enzymes Recent Labs      03/31/17   0500   TP  6.9   ALB  2.3*   AP  120*   SGOT  31      Thyroid Studies Lab Results   Component Value Date/Time    TSH 1.5 04/19/2013 12:00 AM    TSH 1.92 07/24/2012 01:00 AM        Procedures/imaging: see electronic medical records for all procedures/Xrays and details which were not copied into this note but were reviewed prior to creation of Plan.

## 2017-04-01 LAB
ANION GAP BLD CALC-SCNC: 7 MMOL/L (ref 3–18)
BACTERIA SPEC CULT: NORMAL
BASOPHILS # BLD AUTO: 0 K/UL (ref 0–0.06)
BASOPHILS # BLD: 0 % (ref 0–2)
BLD PROD TYP BPU: NORMAL
BLD PROD TYP BPU: NORMAL
BPU ID: NORMAL
BPU ID: NORMAL
BUN SERPL-MCNC: 38 MG/DL (ref 7–18)
BUN/CREAT SERPL: 24 (ref 12–20)
CALCIUM SERPL-MCNC: 8.8 MG/DL (ref 8.5–10.1)
CALLED TO:,BCALL1: NORMAL
CHLORIDE SERPL-SCNC: 100 MMOL/L (ref 100–108)
CO2 SERPL-SCNC: 31 MMOL/L (ref 21–32)
CREAT SERPL-MCNC: 1.61 MG/DL (ref 0.6–1.3)
DIFFERENTIAL METHOD BLD: ABNORMAL
EOSINOPHIL # BLD: 0.1 K/UL (ref 0–0.4)
EOSINOPHIL NFR BLD: 1 % (ref 0–5)
ERYTHROCYTE [DISTWIDTH] IN BLOOD BY AUTOMATED COUNT: 15.1 % (ref 11.6–14.5)
FERRITIN SERPL-MCNC: 2785 NG/ML (ref 8–388)
FOLATE SERPL-MCNC: 5.9 NG/ML (ref 3.1–17.5)
GLUCOSE BLD STRIP.AUTO-MCNC: 244 MG/DL (ref 70–110)
GLUCOSE BLD STRIP.AUTO-MCNC: 268 MG/DL (ref 70–110)
GLUCOSE SERPL-MCNC: 206 MG/DL (ref 74–99)
HCT VFR BLD AUTO: 28.8 % (ref 35–45)
HGB BLD-MCNC: 9.6 G/DL (ref 12–16)
INR PPP: 2 (ref 0.8–1.2)
IRON SATN MFR SERPL: 15 %
IRON SERPL-MCNC: 21 UG/DL (ref 50–175)
LYMPHOCYTES # BLD AUTO: 9 % (ref 21–52)
LYMPHOCYTES # BLD: 1.1 K/UL (ref 0.9–3.6)
MAGNESIUM SERPL-MCNC: 2.5 MG/DL (ref 1.8–2.4)
MCH RBC QN AUTO: 26.7 PG (ref 24–34)
MCHC RBC AUTO-ENTMCNC: 33.3 G/DL (ref 31–37)
MCV RBC AUTO: 80 FL (ref 74–97)
MONOCYTES # BLD: 1 K/UL (ref 0.05–1.2)
MONOCYTES NFR BLD AUTO: 9 % (ref 3–10)
NEUTS SEG # BLD: 10 K/UL (ref 1.8–8)
NEUTS SEG NFR BLD AUTO: 81 % (ref 40–73)
PLATELET # BLD AUTO: 323 K/UL (ref 135–420)
PMV BLD AUTO: 10.1 FL (ref 9.2–11.8)
POTASSIUM SERPL-SCNC: 4.1 MMOL/L (ref 3.5–5.5)
PROTHROMBIN TIME: 21.4 SEC (ref 11.5–15.2)
RBC # BLD AUTO: 3.6 M/UL (ref 4.2–5.3)
SERVICE CMNT-IMP: NORMAL
SODIUM SERPL-SCNC: 138 MMOL/L (ref 136–145)
STATUS OF UNIT,%ST: NORMAL
STATUS OF UNIT,%ST: NORMAL
TIBC SERPL-MCNC: 143 UG/DL (ref 250–450)
UNIT DIVISION, %UDIV: 0
UNIT DIVISION, %UDIV: 0
VIT B12 SERPL-MCNC: >2000 PG/ML (ref 211–911)
WBC # BLD AUTO: 12.3 K/UL (ref 4.6–13.2)

## 2017-04-01 RX ORDER — INSULIN GLARGINE 100 [IU]/ML
24 INJECTION, SOLUTION SUBCUTANEOUS EVERY 12 HOURS
Status: DISCONTINUED | OUTPATIENT
Start: 2017-04-01 | End: 2017-04-06 | Stop reason: HOSPADM

## 2017-04-01 RX ORDER — DOCUSATE SODIUM 100 MG/1
100 CAPSULE, LIQUID FILLED ORAL 2 TIMES DAILY
Status: DISCONTINUED | OUTPATIENT
Start: 2017-04-01 | End: 2017-04-06 | Stop reason: HOSPADM

## 2017-04-01 RX ORDER — POLYETHYLENE GLYCOL 3350 17 G/17G
17 POWDER, FOR SOLUTION ORAL DAILY
Status: DISCONTINUED | OUTPATIENT
Start: 2017-04-02 | End: 2017-04-06 | Stop reason: HOSPADM

## 2017-04-01 RX ORDER — FOLIC ACID 1 MG/1
1 TABLET ORAL DAILY
Status: DISCONTINUED | OUTPATIENT
Start: 2017-04-02 | End: 2017-04-06 | Stop reason: HOSPADM

## 2017-04-01 RX ADMIN — INSULIN LISPRO 4 UNITS: 100 INJECTION, SOLUTION INTRAVENOUS; SUBCUTANEOUS at 08:56

## 2017-04-01 RX ADMIN — INSULIN GLARGINE 20 UNITS: 100 INJECTION, SOLUTION SUBCUTANEOUS at 08:56

## 2017-04-01 RX ADMIN — GABAPENTIN 300 MG: 300 CAPSULE ORAL at 08:55

## 2017-04-01 RX ADMIN — AZTREONAM 1 G: 1 INJECTION, POWDER, LYOPHILIZED, FOR SOLUTION INTRAMUSCULAR; INTRAVENOUS at 03:56

## 2017-04-01 RX ADMIN — CARVEDILOL 12.5 MG: 12.5 TABLET, FILM COATED ORAL at 08:55

## 2017-04-01 RX ADMIN — HYDROMORPHONE HYDROCHLORIDE 1 MG: 1 INJECTION, SOLUTION INTRAMUSCULAR; INTRAVENOUS; SUBCUTANEOUS at 03:34

## 2017-04-01 RX ADMIN — ALLOPURINOL 100 MG: 100 TABLET ORAL at 08:55

## 2017-04-01 RX ADMIN — OXYCODONE HYDROCHLORIDE AND ACETAMINOPHEN 1 TABLET: 5; 325 TABLET ORAL at 05:43

## 2017-04-01 RX ADMIN — INSULIN LISPRO 4 UNITS: 100 INJECTION, SOLUTION INTRAVENOUS; SUBCUTANEOUS at 22:11

## 2017-04-01 RX ADMIN — AZTREONAM 1 G: 1 INJECTION, POWDER, LYOPHILIZED, FOR SOLUTION INTRAMUSCULAR; INTRAVENOUS at 22:59

## 2017-04-01 RX ADMIN — DOCUSATE SODIUM 100 MG: 100 CAPSULE, LIQUID FILLED ORAL at 18:06

## 2017-04-01 RX ADMIN — HYDROMORPHONE HYDROCHLORIDE 1 MG: 1 INJECTION, SOLUTION INTRAMUSCULAR; INTRAVENOUS; SUBCUTANEOUS at 11:56

## 2017-04-01 RX ADMIN — INSULIN GLARGINE 24 UNITS: 100 INJECTION, SOLUTION SUBCUTANEOUS at 22:11

## 2017-04-01 RX ADMIN — ACETAMINOPHEN 500 MG: 500 TABLET ORAL at 14:15

## 2017-04-01 RX ADMIN — CARVEDILOL 12.5 MG: 12.5 TABLET, FILM COATED ORAL at 18:06

## 2017-04-01 RX ADMIN — INSULIN LISPRO 4 UNITS: 100 INJECTION, SOLUTION INTRAVENOUS; SUBCUTANEOUS at 16:31

## 2017-04-01 RX ADMIN — INSULIN LISPRO 6 UNITS: 100 INJECTION, SOLUTION INTRAVENOUS; SUBCUTANEOUS at 11:56

## 2017-04-01 RX ADMIN — AZTREONAM 1 G: 1 INJECTION, POWDER, LYOPHILIZED, FOR SOLUTION INTRAMUSCULAR; INTRAVENOUS at 14:15

## 2017-04-01 RX ADMIN — ALLOPURINOL 100 MG: 100 TABLET ORAL at 18:06

## 2017-04-01 RX ADMIN — GABAPENTIN 300 MG: 300 CAPSULE ORAL at 18:06

## 2017-04-01 RX ADMIN — HYDROMORPHONE HYDROCHLORIDE 1 MG: 1 INJECTION, SOLUTION INTRAMUSCULAR; INTRAVENOUS; SUBCUTANEOUS at 19:38

## 2017-04-01 NOTE — ROUTINE PROCESS
Bedside shift change report given to Constantino Parsons RN (oncoming nurse) by Evangelista Arroyo (offgoing nurse). Report given with SBAR, Kardex, Intake/Output, MAR and Recent Results.

## 2017-04-01 NOTE — PROGRESS NOTES
Mews 3 /54. Respiration regular and non labored. pt's asymptomatic. WIll continue to monitor pt.    04:30 Pt has not voided since admission and bladder scan done and obtained >500. Dr Shakeel Vaz made aware with order rec'd.

## 2017-04-01 NOTE — PROGRESS NOTES
Hospitalist Progress Note    Patient: Last Tai MRN: [id number]  CSN: [id number]    YOB: 1950  Age: 77 y.o. Sex: female    DOA: 3/30/2017 LOS:  LOS: 2 days          INR 2.0 today. 5 am perez placed for patient not voiding and bladder scan >500. Sugars high. Still has right to right iliopsoas area. No BM since Wednesday.  and daughter at bedside, updated with patient's permission. All questions answered to the best of my ability. Assessment/Plan     1. Leukocytosis poa improving - likely reactive in the setting of Iliopsoas muscle hematoma. Continue abx for now; blood cx (3/30) ngtd and urine cx ngtd. 2. Chronic AC with coumadin  3. Pyelonephritis (3/30/2017); her UA is not impressive. Await UC and continue Azactam for now. 4. Multiple antibiotic allergies. 5. Cardiomyopathy, nonischemic - csi following. 6. S/p Biventricular implantable cardioverter-defibrillator - pacemaker check 3/31/17 with normal function per csi.  7. DM2 with hyperglycemia, complicated by peripheral neuropathy. A1c 8.0. Consult diabetes educator. Transition gtt --> lantus, ssi. 8. Anemia of chronic dz - supplement folate. 9. Hyponatremia better. 10. ckd 3 at baseline, in the setting of atrophic kidney  11. Coumadin coagulopathy with INR 4.3 upon admission. 15. Fall at home  13. Dyslipidemia - per Dr. Sanchez Pi office note, she only tolerates Pravastatin. 14. right iliopsoas muscle hematoma - hold coumadin. Per IR, no intervention, monitor for infection. Will mobilize once INR down, likely Monday. 15. Constipation - bowel regimen. 16. Obesity Body mass index is 36.26 kg/(m^2). 17. Urinary retention - perez,  consult. VT once mobile. 18. Avoid chemical dvt prophylaxis  19. Full code. Tele. Pt and ot once INR <2. I discussed the case with Dr. Ana Walker.      Additional Notes:      Case discussed with:  [x]Patient  [x]Family  [x]Nursing  []Case Management  DVT Prophylaxis:  []Lovenox []Hep SQ  [x]SCDs  [x]Coumadin   []On Heparin gtt    Vital signs/Intake and Output:  Visit Vitals    /67 (BP 1 Location: Left arm, BP Patient Position: At rest)    Pulse 77    Temp 98.2 °F (36.8 °C)    Resp 16    Wt 105 kg (231 lb 8 oz)    SpO2 98%    Breastfeeding No    BMI 36.26 kg/m2     Current Shift:  04/01 0701 - 04/01 1900  In: 480 [P.O.:480]  Out: -   Last three shifts:  03/30 1901 - 04/01 0700  In: 380 [P.O.:180; I.V.:200]  Out: 1100 [Urine:1100]    Awake alert and oriented. nad  Ncat. Perrl. RRR  cta b.l  Obese soft nt nd nabs   : clear urine in perez  No edema.  dp 2+ b.l  No focal deficit  No rash    Medications Reviewed      Labs: Results:       Chemistry Recent Labs      04/01/17 0243 03/31/17   0500  03/30/17   1126   GLU  206*  170*  414*   NA  138  135*  130*   K  4.1  3.9  4.5   CL  100  97*  91*   CO2  31  30  28   BUN  38*  41*  36*   CREA  1.61*  1.88*  1.78*   CA  8.8  9.3  9.5   AGAP  7  8  11   BUCR  24*  22*  20   AP   --   120*  130*   TP   --   6.9  7.7   ALB   --   2.3*  2.6*   GLOB   --   4.6*  5.1*   AGRAT   --   0.5*  0.5*      CBC w/Diff Recent Labs      04/01/17 0243 03/31/17   0500  03/30/17   1126   WBC  12.3  15.4*  19.0*   RBC  3.60*  3.56*  4.23   HGB  9.6*  9.5*  11.4*   HCT  28.8*  27.7*  33.0*   PLT  323  293  324   GRANS  81*  86*  83*   LYMPH  9*  3*  6*   EOS  1  0  0      Cardiac Enzymes Recent Labs      03/30/17   1126   CPK  81   CKND1  2.5      Coagulation Recent Labs      04/01/17   0243  03/31/17   0500  03/30/17   1735   PTP  21.4*  25.9*  38.8*   INR  2.0*  2.5*  4.3*   APTT   --    --   47.9*       Lipid Panel Lab Results   Component Value Date/Time    Cholesterol, total 154 07/24/2012 01:00 AM    HDL Cholesterol 48 07/24/2012 01:00 AM    LDL, calculated 90.4 07/24/2012 01:00 AM    VLDL, calculated 15.6 07/24/2012 01:00 AM    Triglyceride 78 07/24/2012 01:00 AM    CHOL/HDL Ratio 3.2 07/24/2012 01:00 AM      BNP No results for input(s): BNPP in the last 72 hours. Liver Enzymes Recent Labs      03/31/17   0500   TP  6.9   ALB  2.3*   AP  120*   SGOT  31      Thyroid Studies Lab Results   Component Value Date/Time    TSH 1.5 04/19/2013 12:00 AM    TSH 1.92 07/24/2012 01:00 AM        Procedures/imaging: see electronic medical records for all procedures/Xrays and details which were not copied into this note but were reviewed prior to creation of Plan.

## 2017-04-02 ENCOUNTER — APPOINTMENT (OUTPATIENT)
Dept: CT IMAGING | Age: 67
DRG: 853 | End: 2017-04-02
Attending: HOSPITALIST
Payer: COMMERCIAL

## 2017-04-02 LAB
ANION GAP BLD CALC-SCNC: 6 MMOL/L (ref 3–18)
BASOPHILS # BLD AUTO: 0 K/UL (ref 0–0.1)
BASOPHILS # BLD: 0 % (ref 0–2)
BUN SERPL-MCNC: 33 MG/DL (ref 7–18)
BUN/CREAT SERPL: 25 (ref 12–20)
CALCIUM SERPL-MCNC: 9.1 MG/DL (ref 8.5–10.1)
CHLORIDE SERPL-SCNC: 101 MMOL/L (ref 100–108)
CO2 SERPL-SCNC: 28 MMOL/L (ref 21–32)
CREAT SERPL-MCNC: 1.3 MG/DL (ref 0.6–1.3)
DIFFERENTIAL METHOD BLD: ABNORMAL
EOSINOPHIL # BLD: 0.2 K/UL (ref 0–0.4)
EOSINOPHIL NFR BLD: 1 % (ref 0–5)
ERYTHROCYTE [DISTWIDTH] IN BLOOD BY AUTOMATED COUNT: 15 % (ref 11.6–14.5)
GLUCOSE BLD STRIP.AUTO-MCNC: 153 MG/DL (ref 70–110)
GLUCOSE BLD STRIP.AUTO-MCNC: 156 MG/DL (ref 70–110)
GLUCOSE BLD STRIP.AUTO-MCNC: 171 MG/DL (ref 70–110)
GLUCOSE BLD STRIP.AUTO-MCNC: 181 MG/DL (ref 70–110)
GLUCOSE SERPL-MCNC: 179 MG/DL (ref 74–99)
HCT VFR BLD AUTO: 26.5 % (ref 35–45)
HGB BLD-MCNC: 8.9 G/DL (ref 12–16)
INR PPP: 1.4 (ref 0.8–1.2)
LYMPHOCYTES # BLD AUTO: 10 % (ref 21–52)
LYMPHOCYTES # BLD: 1 K/UL (ref 0.9–3.6)
MAGNESIUM SERPL-MCNC: 2.4 MG/DL (ref 1.8–2.4)
MCH RBC QN AUTO: 26.6 PG (ref 24–34)
MCHC RBC AUTO-ENTMCNC: 33.6 G/DL (ref 31–37)
MCV RBC AUTO: 79.3 FL (ref 74–97)
MONOCYTES # BLD: 1.2 K/UL (ref 0.05–1.2)
MONOCYTES NFR BLD AUTO: 11 % (ref 3–10)
NEUTS SEG # BLD: 8 K/UL (ref 1.8–8)
NEUTS SEG NFR BLD AUTO: 78 % (ref 40–73)
PLATELET # BLD AUTO: 318 K/UL (ref 135–420)
PMV BLD AUTO: 10.1 FL (ref 9.2–11.8)
POTASSIUM SERPL-SCNC: 4.1 MMOL/L (ref 3.5–5.5)
PROTHROMBIN TIME: 16.6 SEC (ref 11.5–15.2)
RBC # BLD AUTO: 3.34 M/UL (ref 4.2–5.3)
SODIUM SERPL-SCNC: 135 MMOL/L (ref 136–145)
WBC # BLD AUTO: 10.4 K/UL (ref 4.6–13.2)

## 2017-04-02 RX ORDER — FACIAL-BODY WIPES
10 EACH TOPICAL DAILY PRN
Status: DISCONTINUED | OUTPATIENT
Start: 2017-04-02 | End: 2017-04-03

## 2017-04-02 RX ADMIN — GABAPENTIN 300 MG: 300 CAPSULE ORAL at 09:01

## 2017-04-02 RX ADMIN — CARVEDILOL 12.5 MG: 12.5 TABLET, FILM COATED ORAL at 17:50

## 2017-04-02 RX ADMIN — GABAPENTIN 300 MG: 300 CAPSULE ORAL at 17:49

## 2017-04-02 RX ADMIN — INSULIN LISPRO 2 UNITS: 100 INJECTION, SOLUTION INTRAVENOUS; SUBCUTANEOUS at 22:44

## 2017-04-02 RX ADMIN — AZTREONAM 1 G: 1 INJECTION, POWDER, LYOPHILIZED, FOR SOLUTION INTRAMUSCULAR; INTRAVENOUS at 05:23

## 2017-04-02 RX ADMIN — ALLOPURINOL 100 MG: 100 TABLET ORAL at 09:01

## 2017-04-02 RX ADMIN — DOCUSATE SODIUM 100 MG: 100 CAPSULE, LIQUID FILLED ORAL at 17:50

## 2017-04-02 RX ADMIN — OXYCODONE HYDROCHLORIDE AND ACETAMINOPHEN 1 TABLET: 5; 325 TABLET ORAL at 09:00

## 2017-04-02 RX ADMIN — OXYCODONE HYDROCHLORIDE AND ACETAMINOPHEN 1 TABLET: 5; 325 TABLET ORAL at 16:42

## 2017-04-02 RX ADMIN — FOLIC ACID 1 MG: 1 TABLET ORAL at 09:01

## 2017-04-02 RX ADMIN — INSULIN LISPRO 2 UNITS: 100 INJECTION, SOLUTION INTRAVENOUS; SUBCUTANEOUS at 08:47

## 2017-04-02 RX ADMIN — INSULIN GLARGINE 24 UNITS: 100 INJECTION, SOLUTION SUBCUTANEOUS at 22:46

## 2017-04-02 RX ADMIN — ALLOPURINOL 100 MG: 100 TABLET ORAL at 17:50

## 2017-04-02 RX ADMIN — AZTREONAM 1 G: 1 INJECTION, POWDER, LYOPHILIZED, FOR SOLUTION INTRAMUSCULAR; INTRAVENOUS at 22:41

## 2017-04-02 RX ADMIN — HYDROMORPHONE HYDROCHLORIDE 1 MG: 1 INJECTION, SOLUTION INTRAMUSCULAR; INTRAVENOUS; SUBCUTANEOUS at 01:43

## 2017-04-02 RX ADMIN — DOCUSATE SODIUM 100 MG: 100 CAPSULE, LIQUID FILLED ORAL at 09:01

## 2017-04-02 RX ADMIN — HYDROMORPHONE HYDROCHLORIDE 1 MG: 1 INJECTION, SOLUTION INTRAMUSCULAR; INTRAVENOUS; SUBCUTANEOUS at 05:19

## 2017-04-02 RX ADMIN — INSULIN GLARGINE 24 UNITS: 100 INJECTION, SOLUTION SUBCUTANEOUS at 08:47

## 2017-04-02 RX ADMIN — AZTREONAM 1 G: 1 INJECTION, POWDER, LYOPHILIZED, FOR SOLUTION INTRAMUSCULAR; INTRAVENOUS at 13:18

## 2017-04-02 RX ADMIN — INSULIN LISPRO 2 UNITS: 100 INJECTION, SOLUTION INTRAVENOUS; SUBCUTANEOUS at 17:47

## 2017-04-02 RX ADMIN — HYDROMORPHONE HYDROCHLORIDE 1 MG: 1 INJECTION, SOLUTION INTRAMUSCULAR; INTRAVENOUS; SUBCUTANEOUS at 22:37

## 2017-04-02 RX ADMIN — HYDROMORPHONE HYDROCHLORIDE 1 MG: 1 INJECTION, SOLUTION INTRAMUSCULAR; INTRAVENOUS; SUBCUTANEOUS at 17:46

## 2017-04-02 RX ADMIN — CARVEDILOL 12.5 MG: 12.5 TABLET, FILM COATED ORAL at 09:01

## 2017-04-02 NOTE — ROUTINE PROCESS
Bedside and Verbal shift change report given to Linda Elysian Brett (oncoming nurse) by Yeny Hill (offgoing nurse). Report included the following information SBAR, Kardex, MAR and Recent Results. SITUATION:    Code Status: Full Code   Reason for Admission: Iliopsoas muscle hematoma, right, initial encounter   Pyelonephritis   Psoas hematoma, right, secondary to anticoagulant therapy, sequela   Iliopsoas muscle hematoma    [facility] day: 2   Problem List:       Hospital Problems  Date Reviewed: 3/30/2017          Codes Class Noted POA    Pyelonephritis ICD-10-CM: N12  ICD-9-CM: 590.80  3/30/2017 Unknown        * (Principal)Iliopsoas muscle hematoma ICD-10-CM: S70.10XA  ICD-9-CM: 924.00  3/30/2017 Unknown        SIRS (systemic inflammatory response syndrome) ([facility] ICD-10-CM: R65.10  ICD-9-CM: 995.90  3/30/2017 Yes        Psoas hematoma, right, secondary to anticoagulant therapy ICD-10-CM: S30. 1XXA  ICD-9-CM: 924.9, E934.2  3/30/2017 Unknown        Cardiomyopathy, nonischemic (HCC) ICD-10-CM: I42.8  ICD-9-CM: 425.4  6/28/2011 Yes        Biventricular implantable cardioverter-defibrillator in situ ICD-10-CM: Z95.810  ICD-9-CM: V45.02  6/28/2011 Yes    Overview Signed 9/5/2012  5:31 PM by Darvin Osler, MD     Abdominal - done on 8/22/2012 by Dr. Judy Mijares             Diabetic peripheral neuropathy associated with type 2 diabetes mellitus ([facility] (Chronic) ICD-10-CM: E11.42  ICD-9-CM: 250.60, 357.2  6/28/2011 Yes              BACKGROUND:    Past Medical History:   Past Medical History:   Diagnosis Date    Acute venous embolism and thrombosis of deep veins of upper extremity ([facility] 9/5/2012    Anemia in chronic kidney disease(285.21) 9/5/2012    Anticoagulated on Coumadin 9/5/2012    Biventricular implantable cardiac defibrillator in situ 4/28/05    upgrade to BiV AICD, gen change 4/08, pocket revision 10/09    Cardiac cath 08/15/1996    Patent coronaries. Elev LVEDP. EF 50-55%.       Cardiac echocardiogram 06/23/2015    Ltd study. EF 45-50%. Mild, diffuse hypk. Severe apical hypk. No mass or thrombus was clearly identified, although imaging was suboptimal.      Cardiac nuclear imaging test 06/19/2015    Fixed distal apical, distal septal defect more likely due to RV pacing than prior infarct. No ischemia. EF 46%. RWMA c/w RV pacing. Nondiagnostic EKG on pharm stress test.      Cardiovascular lower extremity venous duplex 09/04/2012    Acute, non-occlusive DVT in CFV on right. No DVT on left. No superficial thrombosis bilaterally.  Cardiovascular upper extremity venous duplex 08/27/2012    DVT in axillary vein on left. Left subclavian was not visualized.     CHF (congestive heart failure) (HCC)     Chronic kidney disease, stage IV (severe) ([address] 9/5/2012    Congestive heart failure, unspecified     Diabetes ([address]     Diabetic peripheral neuropathy associated with type 2 diabetes mellitus ([address] 6/28/2011    Difficult airway for intubation 08/22/2012    see anesthesia airway note    Generalized weakness 9/5/2012    Hypercholesterolemia     Hypertension     LBBB (left bundle branch block)     Nonischemic cardiomyopathy     Obesity (BMI 35.0-39.9 without comorbidity) ([address] 3/13/2017    Pacemaker 12/04    status post DDD permanent pacemaker    Peripheral neuropathy ([address]     secondary due to diabetes    Postoperative anemia due to acute blood loss 9/5/2012    Type II or unspecified type diabetes mellitus with neurological manifestations, not stated as uncontrolled 6/28/2011    Unspecified essential hypertension 9/5/2012         Patient taking anticoagulants no     ASSESSMENT:    Changes in Assessment Throughout Shift: Stable     Patient has Central Line: no Reasons if yes:    Patient has Krueger Cath: yes Reasons if yes: obstruction and retention      Last Vitals:     Vitals:    04/01/17 0354 04/01/17 0742 04/01/17 1146 04/01/17 1536   BP: 143/58 138/65 145/67 117/58   Pulse: 96 72 77 88   Resp: 17 18 16 20   Temp: 97.3 °F (36.3 °C) 97.5 °F (36.4 °C) 98.2 °F (36.8 °C) 98.6 °F (37 °C)   SpO2: 96% 96% 98% 97%   Weight:            IV and DRAINS (will only show if present)   Peripheral IV 03/30/17 Right Wrist-Site Assessment: Clean, dry, & intact  Peripheral IV 03/30/17 Left Antecubital-Site Assessment: Clean, dry, & intact     WOUND (if present)   Wound Type:  none   Dressing present Dressing Present : No   Wound Concerns/Notes:  none     PAIN    Pain Assessment    Pain Intensity 1: 0 (04/01/17 1645)    Pain Location 1: Abdomen    Pain Intervention(s) 1: Medication (see MAR)    Patient Stated Pain Goal: 0  o Interventions for Pain:  one  o Intervention effective: yes  o Time of last intervention: 1645   o Reassessment Completed: yes      Last 3 Weights:  Last 3 Recorded Weights in this Encounter    03/30/17 1156 03/31/17 2013 03/31/17 2351   Weight: 107.5 kg (237 lb) 101 kg (222 lb 11.2 oz) 105 kg (231 lb 8 oz)     Weight change: -6.486 kg (-14 lb 4.8 oz)     INTAKE/OUPUT    Current Shift:      Last three shifts: 03/31 0701 - 04/01 1900  In: 1160 [P.O.:660; I.V.:200]  Out: 1850 [Urine:1850]     LAB RESULTS     Recent Labs      04/01/17   0243  03/31/17   0500  03/30/17   1126   WBC  12.3  15.4*  19.0*   HGB  9.6*  9.5*  11.4*   HCT  28.8*  27.7*  33.0*   PLT  323  293  324        Recent Labs      04/01/17   0243  03/31/17   0500  03/30/17   1735  03/30/17   1126   NA  138  135*   --   130*   K  4.1  3.9   --   4.5   GLU  206*  170*   --   414*   BUN  38*  41*   --   36*   CREA  1.61*  1.88*   --   1.78*   CA  8.8  9.3   --   9.5   MG  2.5*   --    --    --    INR  2.0*  2.5*  4.3*   --        RECOMMENDATIONS AND DISCHARGE PLANNING     1. Pending tests/procedures/ Plan of Care or Other Needs: none     2. Discharge plan for patient and Needs/Barriers: none    3. Estimated Discharge Date: 04/04/17 Posted on Whiteboard in [facility] Room: yes      4.  The patient's care plan was reviewed with the oncoming nurse. \"HEALS\" SAFETY CHECK      Fall Risk    Total Score: 4    Safety Measures: Safety Measures: Bed/Chair-Wheels locked, Bed in low position, Call light within reach, Fall prevention (comment), Family at bedside, Gripper socks, Side rails X 3    A safety check occurred in the patient's room between off going nurse and oncoming nurse listed above. The safety check included the below items  Area Items   H  High Alert Medications - Verify all high alert medication drips (heparin, PCA, etc.)   E  Equipment - Suction is set up for ALL patients (with vania)  - Red plugs utilized for all equipment (IV pumps, etc.)  - WOWs wiped down at end of shift.  - Room stocked with oxygen, suction, and other unit-specific supplies   A  Alarms - Bed alarm is set for fall risk patients  - Ensure chair alarm is in place and activated if patient is up in a chair   L  Lines - Check IV for any infiltration  - Krueger bag is empty if patient has a Krueger   - Tubing and IV bags are labeled   S  Safety   - Room is clean, patient is clean, and equipment is clean. - Hallways are clear from equipment besides carts. - Fall bracelet on for fall risk patients  - Ensure room is clear and free of clutter  - Suction is set up for ALL patients (with vania)  - Hallways are clear from equipment besides carts.    - Isolation precautions followed, supplies available outside room, sign posted     Puneet Hanley

## 2017-04-02 NOTE — PROGRESS NOTES
Bedside and Verbal shift change report given to Jessy Queen RN (oncoming nurse) by Candis Reese RN (offgoing nurse). Report included the following information SBAR and Kardex.

## 2017-04-02 NOTE — PROGRESS NOTES
conducted an initial consultation and Spiritual Assessment for Anitha Garay, who is a 77 y.o.,female. Patients Primary Language is: [place]. According to the patients EMR [employer] Affiliation is: [place]. 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    Unspecified essential hypertension 09/05/2012    Chronic kidney disease, stage IV (severe) ([address] 09/05/2012    Anemia in chronic kidney disease 09/05/2012    Anemia due to blood loss, acute 09/05/2012    Acute venous embolism and thrombosis of deep veins of upper extremity ([address] 09/05/2012    Anticoagulated on Coumadin 09/05/2012    Status post PICC central line placement 09/05/2012    Generalized weakness 09/05/2012    Difficult airway for intubation 08/22/2012    AICD generator infection ([address] 08/20/2012    Pain due to any device, implant or graft 05/24/2012    HALI (obstructive sleep apnea) 02/07/2012    Cardiomyopathy, nonischemic ([address] 06/28/2011    Complete heart block (HCC) 06/28/2011    Biventricular implantable cardioverter-defibrillator in situ 06/28/2011    LBBB (left bundle branch block) 06/28/2011    Type II or unspecified type diabetes mellitus with neurological manifestations, not stated as uncontrolled 06/28/2011    Dyslipidemia 06/28/2011    Diabetic peripheral neuropathy associated with type 2 diabetes mellitus ([address] 06/28/2011        The  provided the following Interventions:  Initiated a relationship of care and support.    Listened empathically. Provided chaplaincy education. Provided information about Spiritual Care Services. Offered prayer and assurance of continued prayers on patient's behalf. Chart reviewed. The following outcomes where achieved:  Patient processed feeling about current hospitalization. Patient expressed gratitude for 's visit. Assessment:  There are no spiritual or Sabianist issues which require intervention at this time. Plan:  Chaplains will continue to follow and will provide pastoral care on an as needed/requested basis.  recommends bedside caregivers page  on duty if patient shows signs of acute spiritual or emotional distress.         [address] [facility].   [phone]

## 2017-04-02 NOTE — ROUTINE PROCESS
Bedside and Verbal shift change report given to Gracie Singh RN (oncoming nurse) by Josh Gonzales RN (offgoing nurse). Report included the following information SBAR, Kardex, Intake/Output, MAR and Recent Results.

## 2017-04-02 NOTE — PROGRESS NOTES
Hospitalist Progress Note    Patient: Pooja Benton MRN: [id number]  CSN: [id number]    YOB: 1950  Age: 77 y.o. Sex: female    DOA: 3/30/2017 LOS:  LOS: 3 days          INR 1.4 today. Hb 8.9. Patient reports ongoing pain to RLE. Denies chest pain or shortness of breath. Assessment/Plan     1. Leukocytosis poa improving - likely reactive in the setting of Iliopsoas muscle hematoma. Continue abx for now; blood cx (3/30) ngtd and urine cx ngtd. 2. Chronic AC with coumadin  3. Pyelonephritis (3/30/2017); her UA is not impressive. UC negative. continue Azactam for now. 4. Multiple antibiotic allergies. 5. Cardiomyopathy, nonischemic - csi following. 6. S/p Biventricular implantable cardioverter-defibrillator - pacemaker check 3/31/17 with normal function per csi.  7. DM2 with hyperglycemia, complicated by peripheral neuropathy. A1c 8.0. Consult diabetes educator. Transition gtt --> lantus, ssi. 8. Anemia of chronic dz - supplement folate. 9. Hyponatremia better. 10. ckd 3 at baseline, in the setting of atrophic kidney  11. Coumadin coagulopathy with INR 4.3 upon admission. 15. Fall at home  13. Dyslipidemia - per Dr. Maxx Gibbons office note, she only tolerates Pravastatin. 14. right iliopsoas muscle hematoma - hold coumadin. Per IR, no intervention, monitor for infection. Will mobilize once INR down, likely Monday. Repeat CT now given ongoing pain and drop in hb.   15. Constipation - bowel regimen. 16. Obesity Body mass index is 34.86 kg/(m^2). 17. Urinary retention - DEMARIO perez input appreciated. VT once mobile. 18. Avoid chemical dvt prophylaxis  19. Full code. Tele.  Pt and ot once INR <2. I discussed the case with Dr. Raudel Johnson.    Additional Notes:      Case discussed with:  [x]Patient  [x]Family  [x]Nursing  []Case Management  DVT Prophylaxis:  []Lovenox  []Hep SQ  [x]SCDs  [x]Coumadin   []On Heparin gtt    Vital signs/Intake and Output:  Visit Vitals    /65 (BP 1 Location: Right arm, BP Patient Position: At rest)    Pulse 64    Temp 97.8 °F (36.6 °C)    Resp 20    Wt 101 kg (222 lb 9.6 oz)    SpO2 98%    Breastfeeding No    BMI 34.86 kg/m2     Current Shift:  04/02 0701 - 04/02 1900  In: -   Out: 375 [Urine:375]  Last three shifts:  03/31 1901 - 04/02 0700  In: 1160 [P.O.:660; I.V.:200]  Out: 3250 [Urine:3250]    Awake alert and oriented. Nad. Daughter at bedside. Ncat. Perrl. RRR  cta b.l  Obese soft nt nd nabs   : clear urine in perez  No edema. dp 2+ b.l. +ttp to right upper thigh. No focal deficit  No rash    Medications Reviewed      Labs: Results:       Chemistry Recent Labs      04/02/17 0225 04/01/17 0243 03/31/17   0500   GLU  179*  206*  170*   NA  135*  138  135*   K  4.1  4.1  3.9   CL  101  100  97*   CO2  28  31  30   BUN  33*  38*  41*   CREA  1.30  1.61*  1.88*   CA  9.1  8.8  9.3   AGAP  6  7  8   BUCR  25*  24*  22*   AP   --    --   120*   TP   --    --   6.9   ALB   --    --   2.3*   GLOB   --    --   4.6*   AGRAT   --    --   0.5*      CBC w/Diff Recent Labs      04/02/17 0225 04/01/17 0243 03/31/17   0500   WBC  10.4  12.3  15.4*   RBC  3.34*  3.60*  3.56*   HGB  8.9*  9.6*  9.5*   HCT  26.5*  28.8*  27.7*   PLT  318  323  293   GRANS  78*  81*  86*   LYMPH  10*  9*  3*   EOS  1  1  0      Cardiac Enzymes No results for input(s): CPK, CKND1, CARLI in the last 72 hours. No lab exists for component: CKRMB, TROIP   Coagulation Recent Labs      04/02/17 0225 04/01/17 0243 03/30/17   1735   PTP  16.6*  21.4*   < >  38.8*   INR  1.4*  2.0*   < >  4.3*   APTT   --    --    --   47.9*    < > = values in this interval not displayed.        Lipid Panel Lab Results   Component Value Date/Time    Cholesterol, total 154 07/24/2012 01:00 AM    HDL Cholesterol 48 07/24/2012 01:00 AM    LDL, calculated 90.4 07/24/2012 01:00 AM    VLDL, calculated 15.6 07/24/2012 01:00 AM    Triglyceride 78 07/24/2012 01:00 AM    CHOL/HDL Ratio 3.2 07/24/2012 01:00 AM      BNP No results for input(s): BNPP in the last 72 hours. Liver Enzymes Recent Labs      03/31/17   0500   TP  6.9   ALB  2.3*   AP  120*   SGOT  31      Thyroid Studies Lab Results   Component Value Date/Time    TSH 1.5 04/19/2013 12:00 AM    TSH 1.92 07/24/2012 01:00 AM        Procedures/imaging: see electronic medical records for all procedures/Xrays and details which were not copied into this note but were reviewed prior to creation of Plan.

## 2017-04-02 NOTE — CONSULTS
Consult    Patient: Marcella Finnegan MRN: [id number]  SSN: [id number]    YOB: 1950  Age: 77 y.o. Sex: female      Admit Date: 3/30/2017    LOS: 3 days     Subjective:     ADMITTED WITH RT ILIOSPOAS HEMATOMA FOLLOWING A FALL  HAD BEEN ON COUMADIN  CT NOTED BELOW  SEEN TO HAVE VERY LARGE PVR. ... OVER  550 CC  BARNETT PLACED  DENIES ANY PRIOR  SX OF FREQ ,  URGENCY, INCONTINENCE  NO  PRIOR ; SURGERY        Past Medical History:   Diagnosis Date    Acute venous embolism and thrombosis of deep veins of upper extremity ([address] 9/5/2012    Anemia in chronic kidney disease(285.21) 9/5/2012    Anticoagulated on Coumadin 9/5/2012    Biventricular implantable cardiac defibrillator in situ 4/28/05    upgrade to BiV AICD, gen change 4/08, pocket revision 10/09    Cardiac cath 08/15/1996    Patent coronaries. Elev LVEDP. EF 50-55%.  Cardiac echocardiogram 06/23/2015    Ltd study. EF 45-50%. Mild, diffuse hypk. Severe apical hypk. No mass or thrombus was clearly identified, although imaging was suboptimal.      Cardiac nuclear imaging test 06/19/2015    Fixed distal apical, distal septal defect more likely due to RV pacing than prior infarct. No ischemia. EF 46%. RWMA c/w RV pacing. Nondiagnostic EKG on pharm stress test.      Cardiovascular lower extremity venous duplex 09/04/2012    Acute, non-occlusive DVT in CFV on right. No DVT on left. No superficial thrombosis bilaterally.  Cardiovascular upper extremity venous duplex 08/27/2012    DVT in axillary vein on left. Left subclavian was not visualized.     CHF (congestive heart failure) (HCC)     Chronic kidney disease, stage IV (severe) ([address] 9/5/2012    Congestive heart failure, unspecified     Diabetes ([address]     Diabetic peripheral neuropathy associated with type 2 diabetes mellitus ([address] 6/28/2011    Difficult airway for intubation 08/22/2012    see anesthesia airway note    Generalized weakness 9/5/2012    Hypercholesterolemia     Hypertension     LBBB (left bundle branch block)     Nonischemic cardiomyopathy     Obesity (BMI 35.0-39.9 without comorbidity) ([facility] 3/13/2017    Pacemaker 12/04    status post DDD permanent pacemaker    Peripheral neuropathy ([facility]     secondary due to diabetes    Postoperative anemia due to acute blood loss 9/5/2012    Type II or unspecified type diabetes mellitus with neurological manifestations, not stated as uncontrolled 6/28/2011    Unspecified essential hypertension 9/5/2012       Past Surgical History:   Procedure Laterality Date    HX CARPAL TUNNEL RELEASE  4/07    right     HX CHOLECYSTECTOMY  1994    HX HYSTERECTOMY  1973    HX OTHER SURGICAL  6/11/2012    AICD revision    HX PACEMAKER  4/28/2005    [employer] AICD       Review of Systems - Negative except NOTED        Current Facility-Administered Medications:     folic acid (FOLVITE) tablet 1 mg, 1 mg, Oral, DAILY, Sascha Webster MD    insulin glargine (LANTUS) injection 24 Units, 24 Units, SubCUTAneous, Q12H, Sascha Webster MD, 24 Units at 04/01/17 2211    docusate sodium (COLACE) capsule 100 mg, 100 mg, Oral, BID, Sascha Webster MD, 100 mg at 04/01/17 1806    polyethylene glycol (MIRALAX) packet 17 g, 17 g, Oral, DAILY, Sascha Webster MD    carvedilol (COREG) tablet 12.5 mg, 12.5 mg, Oral, BID, Karlene Mock MD, 12.5 mg at 04/01/17 1806    insulin lispro (HUMALOG) injection, , SubCUTAneous, AC&HS, Sascha Webster MD, 4 Units at 04/01/17 2211    glucose chewable tablet 16 g, 16 g, Oral, PRN, Sascha Webster MD    glucagon (GLUCAGEN) injection 1 mg, 1 mg, IntraMUSCular, PRN, Sascha Webster MD    dextrose (D50W) injection syrg 12.5-25 g, 25-50 mL, IntraVENous, PRN, Sascha Webster MD    acetaminophen (TYLENOL) tablet 500 mg, 500 mg, Oral, Q6H PRN, Sascha Webster MD, 500 mg at 04/01/17 1415    oxyCODONE-acetaminophen (PERCOCET) 5-325 mg per tablet 1 Tab, 1 Tab, Oral, Q6H PRN, Sascha Webster MD, 1 Tab at 04/01/17 0543    allopurinol (ZYLOPRIM) tablet 100 mg, 100 mg, Oral, BID, Jory Rivera MD, 100 mg at 04/01/17 1806    gabapentin (NEURONTIN) capsule 300 mg, 300 mg, Oral, BID, Jory Rivera MD, 300 mg at 04/01/17 1806    aztreonam (AZACTAM) 1 g in 0.9% sodium chloride (MBP/ADV) 100 mL MBP, 1 g, IntraVENous, Q8H, Jory Rivera MD, Last Rate: 200 mL/hr at 04/02/17 0523, 1 g at 04/02/17 0523    HYDROmorphone (PF) (DILAUDID) injection 1 mg, 1 mg, IntraVENous, Q4H PRN, Jory Rivera MD, 1 mg at 04/02/17 0519    diphenhydrAMINE (BENADRYL) injection 12.5 mg, 12.5 mg, IntraVENous, Q4H PRN, Jory Rivera MD, 12.5 mg at 03/31/17 1838    ondansetron (ZOFRAN) injection 4 mg, 4 mg, IntraVENous, Q6H PRN, Jory Rivera MD    0.9% sodium chloride infusion 250 mL, 250 mL, IntraVENous, PRN, Jory Rivera MD    Objective:     Vitals:    04/01/17 1900 04/01/17 2300 04/02/17 0300 04/02/17 0500   BP: 125/66 137/72 159/78    Pulse: 73 71 72    Resp: 18 18 17    Temp: 98.5 °F (36.9 °C) 98.4 °F (36.9 °C) 98.1 °F (36.7 °C)    SpO2: 94% 100% 97%    Weight:    222 lb 9.6 oz (101 kg)        Intake and Output:  Current Shift:    Last three shifts: 03/31 1901 - 04/02 0700  In: 1160 [P.O.:660; I.V.:200]  Out: 3250 [Urine:3250]    Physical Exam:   GENERAL: alert, cooperative, no distress, appears stated age  LUNG: clear to auscultation bilaterally  HEART: regular rate and rhythm, S1, S2 normal, no murmur, click, rub or gallop  ABDOMEN: soft, TENDER IN RLQ  WITHOUT REBOUND. Bowel sounds normal. No masses,  no organomegaly  FLANK:  TENDER ON RT  EXTREMITIES:  TEDNER  MOVING RT LEG. Ceclia Reyes SWELLING    Results Review:    Chemistry Recent Labs      04/02/17   0225  04/01/17   0243  03/31/17   0500  03/30/17   1126   GLU  179*  206*  170*  414*   NA  135*  138  135*  130*   K  4.1  4.1  3.9  4.5   CL  101  100  97*  91*   CO2  28  31  30  28   BUN  33*  38*  41*  36*   CREA  1.30  1.61* 1.88*  1.78*   CA  9.1  8.8  9.3  9.5   MG  2.4  2.5*   --    --    AGAP  6  7  8  11   BUCR  25*  24*  22*  20   AP   --    --   120*  130*   TP   --    --   6.9  7.7   ALB   --    --   2.3*  2.6*   GLOB   --    --   4.6*  5.1*   AGRAT   --    --   0.5*  0.5*        Lactic Acid No results found for: LAC  No results for input(s): LAC in the last 72 hours. Liver Enzymes Protein, total   Date Value Ref Range Status   03/31/2017 6.9 6.4 - 8.2 g/dL Final     Albumin   Date Value Ref Range Status   03/31/2017 2.3 (L) 3.4 - 5.0 g/dL Final     Globulin   Date Value Ref Range Status   03/31/2017 4.6 (H) 2.0 - 4.0 g/dL Final     A-G Ratio   Date Value Ref Range Status   03/31/2017 0.5 (L) 0.8 - 1.7   Final     AST (SGOT)   Date Value Ref Range Status   03/31/2017 31 15 - 37 U/L Final     Alk.  phosphatase   Date Value Ref Range Status   03/31/2017 120 (H) 45 - 117 U/L Final     Recent Labs      03/31/17   0500  03/30/17   1126   TP  6.9  7.7   ALB  2.3*  2.6*   GLOB  4.6*  5.1*   AGRAT  0.5*  0.5*   SGOT  31  15   AP  120*  130*        CBC w/Diff Recent Labs      04/02/17   0225  04/01/17   0243  03/31/17   0500   WBC  10.4  12.3  15.4*   RBC  3.34*  3.60*  3.56*   HGB  8.9*  9.6*  9.5*   HCT  26.5*  28.8*  27.7*   PLT  318  323  293   GRANS  78*  81*  86*   LYMPH  10*  9*  3*   EOS  1  1  0        Cardiac Enzymes No results found for: CPK, CKMMB, CKMB, RCK3, CKMBT, CKNDX, CKND1, CARLI, TROPT, TROIQ, AMBERLY, TROPT, TNIPOC, BNP, BNPP     BNP Lab Results   Component Value Date/Time    B-type Natriuretic Peptide 111.9 11/19/2013 10:17 AM        Coagulation Recent Labs      04/02/17   0225  04/01/17   0243  03/31/17   0500  03/30/17   1735   PTP  16.6*  21.4*  25.9*  38.8*   INR  1.4*  2.0*  2.5*  4.3*   APTT   --    --    --   47.9*         Thyroid  Lab Results   Component Value Date/Time    TSH 1.5 04/19/2013 12:00 AM    TSH 1.92 07/24/2012 01:00 AM          ABG No results for input(s): PHI, PHI, PCO2I, PO2, PO2I, HCO3, HCO3I, FIO2, FIO2I in the last 72 hours. No lab exists for component: POC2     Urinalysis Lab Results   Component Value Date/Time    Color YELLOW 03/30/2017 01:20 PM    Appearance CLEAR 03/30/2017 01:20 PM    Specific gravity 1.018 03/30/2017 01:20 PM    pH (UA) 6.5 03/30/2017 01:20 PM    Protein 30 03/30/2017 01:20 PM    Glucose >1000 03/30/2017 01:20 PM    Ketone TRACE 03/30/2017 01:20 PM    Bilirubin NEGATIVE  03/30/2017 01:20 PM    Urobilinogen 0.2 03/30/2017 01:20 PM    Nitrites NEGATIVE  03/30/2017 01:20 PM    Leukocyte Esterase NEGATIVE  03/30/2017 01:20 PM    Epithelial cells FEW 03/30/2017 01:20 PM    Bacteria FEW 03/30/2017 01:20 PM    WBC 0 to 3 03/30/2017 01:20 PM    RBC 4 to 10 03/30/2017 01:20 PM        Micro  Recent Labs      03/30/17   1320  03/30/17   1310  03/30/17   1255   CULT  NO GROWTH 2 DAYS  NO GROWTH 3 DAYS  NO GROWTH 3 DAYS     Recent Labs      03/30/17   1320  03/30/17   1310  03/30/17   1255   CULT  NO GROWTH 2 DAYS  NO GROWTH 3 DAYS  NO GROWTH 3 DAYS        US Results: (Most Recent)   Results from [place] encounter on 07/08/14   US THYROID/PARATHYROID/SOFT TISS   Narrative THYROID ULTRASOUND    CPT CODE: 67409    COMPARISON: None. INDICATIONS: Goiter    TECHNIQUE: Real time and color doppler imaging is performed. FINDINGS: There is a hypoechoic nodule in the upper pole of the right lobe  measuring 1.5 x 1.2 x 0.8 cm. A second lower pole right lobe nodule is also  hypoechoic and measures 10 x 7 x 10 mm. These are relatively hypovascular. There is a hypoechoic nodule in the midpole of the left lobe measuring 5 x 5 x  5 mm, with internal echoes. Another measures 6 x 6 x 4 mm and is also complex. In the right lobe measures 5.9 x 2.5 x 1.9 cm and the left lobe 5.4 x 2.3 x 2.7  cm. Impression IMPRESSION:    Small bilateral solid thyroid nodules as described above.          CT (Most Recent)   Results from Hospital Encounter encounter on 03/30/17   CT ABD PELV WO CONT Narrative CT of abdomen and pelvis without contrast     INDICATION: Back pain and hip pain after fall 2 days ago    COMPARISON: CT 3/16/17. TECHNIQUE: 5 mm helical scan to the abdomen and pelvis is obtained  from the  diaphragm to the symphysis pubis without  IV contrast administration. All CT scans at this facility performed using dose optimization techniques as  appreciated to a performed exam, to include automated exposure control,  adjustment of the mA and or KU according to patient size (including appropriate  matching for site specific examination), or use of iterative reconstruction  technique. FINDINGS: The study is suboptimal due to lack of IV contrast.  Subtle  abnormality can be under detected. CT OF ABDOMEN:    Lung Bases: Clear. Liver: Normal.    Gallbladder: Surgically absent. Biliary System: No ductal dilatation. Spleen: Mildly enlarged, measuring 8.6 x 14.1 x 11.3 cm. There are linear  hyperdensities noted at the lateral aspect of the spleen along the indentation,  more likely benign calcifications. Small punctated calcifications also seen at  anterior tip. Pancreas: Normal.    Kidneys: Atrophic right kidney with cortical thinning and cortical scar. There  is 7 x 8 mm hypodense nodule exophytically seen at lateral upper pole of right  kidney. 8 mm hyperdense nodule at adjacent anterior right kidney also seen. There are unchanged since the recent CT and too small for accurate evaluation. There is a benign cyst at the posterior midpole of left the kidney measuring 1.5  x 1.6 cm. There is 5.0 x 6.4 x 6.6 cm large exophytic cyst with bilobed  appearance identified in the lower pole of left kidney. There is 2 mm  nonobstructive calculus in the midpole of left kidney. No hydronephrosis. Adrenal Glands: Normal.    Bowel: The stomach is poorly distended. Small hiatal hernia. No bowel  dilatation, obstruction or inflammation. Peritoneum/Retroperitoneum: No adenopathy.  No free air or fluid. Vasculature: Unremarkable for age. CT PELVIS:     Bowel: The small and large bowel are nondilated. There is subtle fatty stranding  identified posterior to the cecum with no cecal wall abnormality. Normal  appendix. Scattered diverticula in the colon. Bladder: Poorly distended. Peritoneum/Retroperitoneum: There is asymmetric enlarged right psoas muscle  extending to the right is ilio-psoas  muscle compared to the left with heterogeneous hyperdense and hypodense  components identified. The finding is most concerning for intramuscular  hemorrhage in different stage. It overall measures 6.0 x 5.1 x 15.3 cm. No  additional peritoneal or retroperitoneal hemorrhage or fluid collection. No  adenopathy. Other: The uterus is likely surgically absent. No free fluid or free air seen. There is mild fatty stranding identified surrounding the right groin but no  discrete hematoma or mass lesion seen. .    CT OSSEOUS STRUCTURES:       Moderate spondylosis. No acute bony injury. There is very mild curvature to the  left in upper lumbar spine noted. Compression fracture or compression deformity. No listhesis. Impression IMPRESSION:     1. Asymmetrically enlarged right iliopsoas muscle compared to the left extending  to the right groin region with evidence of intramuscular hematoma in different  stage. This is new compared to the recent CT 3/16/17. Mild fatty stranding noted  in the right pericolonic gutter and the right groin. No additional hematoma or  other significant injury seen. 2. Left renal cysts. Small hypodense and hyperdense nodules at the upper pole of  atrophic right kidney, unchanged since the prior. 3. Mild splenomegaly with nonspecific linear calcifications laterally and  anteriorly. 4. No hip fracture or lumbar compression fracture seen. Mild curvature to the  left in upper lumbar spine could be positional or mild scoliosis.     Thank you for this referral. Assessment:     Principal Problem:    Iliopsoas muscle hematoma (3/30/2017)    Active Problems:    Cardiomyopathy, nonischemic (HCC) (6/28/2011)      Biventricular implantable cardioverter-defibrillator in situ (6/28/2011)      Overview: Abdominal - done on 8/22/2012 by Dr. Patrica Andino      Diabetic peripheral neuropathy associated with type 2 diabetes mellitus ([facility] (6/28/2011)      Pyelonephritis (3/30/2017)      SIRS (systemic inflammatory response syndrome) ([facility] (3/30/2017)      Psoas hematoma, right, secondary to anticoagulant therapy (3/30/2017)      URINARY RETENTION  Plan:     WILL PLAN TO LEAVE BARNETT IN UNTIL  AMBULATORY AND OFF  PAIN MEDS  VOIDING TRIAL IN NEXT FEW DAYS. ...  IF STILL IN RETENTION,  WILL PLAN TO   SEND HOME WITH  BARNETT AND LEG BAG AND EVALUATE IN OFFICE    [address] YOU  AND WILL FOLLOW AS NEEDED        Signed By: Mai Antonio MD ,  FACS    April 2, 2017        PAGER:   [phone]  CELL:  [address]:   [facility]:   [phone]

## 2017-04-03 LAB
ANION GAP BLD CALC-SCNC: 7 MMOL/L (ref 3–18)
BASOPHILS # BLD AUTO: 0 K/UL (ref 0–0.06)
BASOPHILS # BLD: 0 % (ref 0–2)
BUN SERPL-MCNC: 26 MG/DL (ref 7–18)
BUN/CREAT SERPL: 25 (ref 12–20)
CALCIUM SERPL-MCNC: 9.4 MG/DL (ref 8.5–10.1)
CHLORIDE SERPL-SCNC: 101 MMOL/L (ref 100–108)
CO2 SERPL-SCNC: 30 MMOL/L (ref 21–32)
CREAT SERPL-MCNC: 1.04 MG/DL (ref 0.6–1.3)
DIFFERENTIAL METHOD BLD: ABNORMAL
EOSINOPHIL # BLD: 0.2 K/UL (ref 0–0.4)
EOSINOPHIL NFR BLD: 2 % (ref 0–5)
ERYTHROCYTE [DISTWIDTH] IN BLOOD BY AUTOMATED COUNT: 15.1 % (ref 11.6–14.5)
GLUCOSE BLD STRIP.AUTO-MCNC: 105 MG/DL (ref 70–110)
GLUCOSE BLD STRIP.AUTO-MCNC: 122 MG/DL (ref 70–110)
GLUCOSE BLD STRIP.AUTO-MCNC: 134 MG/DL (ref 70–110)
GLUCOSE BLD STRIP.AUTO-MCNC: 145 MG/DL (ref 70–110)
GLUCOSE SERPL-MCNC: 112 MG/DL (ref 74–99)
HCT VFR BLD AUTO: 29 % (ref 35–45)
HGB BLD-MCNC: 9.7 G/DL (ref 12–16)
INR PPP: 1.4 (ref 0.8–1.2)
LYMPHOCYTES # BLD AUTO: 14 % (ref 21–52)
LYMPHOCYTES # BLD: 1.3 K/UL (ref 0.9–3.6)
MAGNESIUM SERPL-MCNC: 2.5 MG/DL (ref 1.8–2.4)
MCH RBC QN AUTO: 26.8 PG (ref 24–34)
MCHC RBC AUTO-ENTMCNC: 33.4 G/DL (ref 31–37)
MCV RBC AUTO: 80.1 FL (ref 74–97)
MONOCYTES # BLD: 0.6 K/UL (ref 0.05–1.2)
MONOCYTES NFR BLD AUTO: 7 % (ref 3–10)
NEUTS SEG # BLD: 7.1 K/UL (ref 1.8–8)
NEUTS SEG NFR BLD AUTO: 77 % (ref 40–73)
PLATELET # BLD AUTO: 314 K/UL (ref 135–420)
PMV BLD AUTO: 9.5 FL (ref 9.2–11.8)
POTASSIUM SERPL-SCNC: 4.5 MMOL/L (ref 3.5–5.5)
PROTHROMBIN TIME: 16.3 SEC (ref 11.5–15.2)
RBC # BLD AUTO: 3.62 M/UL (ref 4.2–5.3)
SODIUM SERPL-SCNC: 138 MMOL/L (ref 136–145)
WBC # BLD AUTO: 9.2 K/UL (ref 4.6–13.2)

## 2017-04-03 RX ORDER — FACIAL-BODY WIPES
10 EACH TOPICAL DAILY PRN
Status: DISCONTINUED | OUTPATIENT
Start: 2017-04-04 | End: 2017-04-06 | Stop reason: HOSPADM

## 2017-04-03 RX ORDER — FACIAL-BODY WIPES
10 EACH TOPICAL
Status: COMPLETED | OUTPATIENT
Start: 2017-04-03 | End: 2017-04-03

## 2017-04-03 RX ADMIN — HYDROMORPHONE HYDROCHLORIDE 1 MG: 1 INJECTION, SOLUTION INTRAMUSCULAR; INTRAVENOUS; SUBCUTANEOUS at 22:13

## 2017-04-03 RX ADMIN — AZTREONAM 1 G: 1 INJECTION, POWDER, LYOPHILIZED, FOR SOLUTION INTRAMUSCULAR; INTRAVENOUS at 13:46

## 2017-04-03 RX ADMIN — OXYCODONE HYDROCHLORIDE AND ACETAMINOPHEN 1 TABLET: 5; 325 TABLET ORAL at 23:11

## 2017-04-03 RX ADMIN — GABAPENTIN 300 MG: 300 CAPSULE ORAL at 19:03

## 2017-04-03 RX ADMIN — ALLOPURINOL 100 MG: 100 TABLET ORAL at 08:21

## 2017-04-03 RX ADMIN — FOLIC ACID 1 MG: 1 TABLET ORAL at 08:21

## 2017-04-03 RX ADMIN — INSULIN GLARGINE 24 UNITS: 100 INJECTION, SOLUTION SUBCUTANEOUS at 23:12

## 2017-04-03 RX ADMIN — DOCUSATE SODIUM 100 MG: 100 CAPSULE, LIQUID FILLED ORAL at 08:22

## 2017-04-03 RX ADMIN — OXYCODONE HYDROCHLORIDE AND ACETAMINOPHEN 1 TABLET: 5; 325 TABLET ORAL at 09:59

## 2017-04-03 RX ADMIN — ALLOPURINOL 100 MG: 100 TABLET ORAL at 19:04

## 2017-04-03 RX ADMIN — HYDROMORPHONE HYDROCHLORIDE 1 MG: 1 INJECTION, SOLUTION INTRAMUSCULAR; INTRAVENOUS; SUBCUTANEOUS at 19:05

## 2017-04-03 RX ADMIN — BISACODYL 10 MG: 10 SUPPOSITORY RECTAL at 13:47

## 2017-04-03 RX ADMIN — INSULIN GLARGINE 24 UNITS: 100 INJECTION, SOLUTION SUBCUTANEOUS at 08:22

## 2017-04-03 RX ADMIN — DOCUSATE SODIUM 100 MG: 100 CAPSULE, LIQUID FILLED ORAL at 19:01

## 2017-04-03 RX ADMIN — HYDROMORPHONE HYDROCHLORIDE 1 MG: 1 INJECTION, SOLUTION INTRAMUSCULAR; INTRAVENOUS; SUBCUTANEOUS at 02:35

## 2017-04-03 RX ADMIN — OXYCODONE HYDROCHLORIDE AND ACETAMINOPHEN 1 TABLET: 5; 325 TABLET ORAL at 16:33

## 2017-04-03 RX ADMIN — HYDROMORPHONE HYDROCHLORIDE 1 MG: 1 INJECTION, SOLUTION INTRAMUSCULAR; INTRAVENOUS; SUBCUTANEOUS at 08:21

## 2017-04-03 RX ADMIN — CARVEDILOL 12.5 MG: 12.5 TABLET, FILM COATED ORAL at 19:04

## 2017-04-03 RX ADMIN — AZTREONAM 1 G: 1 INJECTION, POWDER, LYOPHILIZED, FOR SOLUTION INTRAMUSCULAR; INTRAVENOUS at 05:36

## 2017-04-03 RX ADMIN — HYDROMORPHONE HYDROCHLORIDE 1 MG: 1 INJECTION, SOLUTION INTRAMUSCULAR; INTRAVENOUS; SUBCUTANEOUS at 13:59

## 2017-04-03 RX ADMIN — GABAPENTIN 300 MG: 300 CAPSULE ORAL at 08:22

## 2017-04-03 RX ADMIN — CARVEDILOL 12.5 MG: 12.5 TABLET, FILM COATED ORAL at 08:21

## 2017-04-03 RX ADMIN — POLYETHYLENE GLYCOL 3350 17 G: 17 POWDER, FOR SOLUTION ORAL at 08:20

## 2017-04-03 NOTE — ROUTINE PROCESS
Bedside and Verbal shift change report given to Selma 59 (oncoming nurse) by Shaq Huffman RN (offgoing nurse). Report given with SBAR, Kardex, Intake/Output, MAR, Accordion and Recent Results.

## 2017-04-03 NOTE — ROUTINE PROCESS
Bedside and Verbal shift change report given to Sammie King RN  (oncoming nurse) by Jaydon Valera RN  (offgoing nurse). Report included the following information SBAR, Kardex, ED Summary, Intake/Output, MAR and Recent Results.

## 2017-04-03 NOTE — PROGRESS NOTES
Care Management Interventions  PCP Verified by CM: No  Palliative Care Consult (Criteria: CHF and RRAT>21): No (No order RRAT= 30)  Reason for No Palliative Care Consult: Other (see comment)  Transition of Care Consult (CM Consult): Discharge Planning, Home Health (sher valadez from [address] [place] [address] depending on PT recommendations- )  [address] Yes  Reason Outside [place]:  (prefered to review FOC with daughter before making a choice)  Discharge Durable Medical Equipment: No (Uses a 3 point cane at home and CPAP machine )  Physical Therapy Consult: Yes  Occupational Therapy Consult: Yes  Speech Therapy Consult: No  Current Support Network: Lives with Spouse (lives wiht  in [facility] 3 steps to enter)  Confirm Follow Up Transport: Family ( can transport)  Plan discussed with Pt/Family/Caregiver: Yes  Freedom of Choice Offered:  (given [facility] list)  Discharge Location  Discharge Placement: Home with home health     Met with pt and sister ( with her permission) reviewed discharge planning and possibility of home health care on discharge depending on PT evaluation. She gave verbal permission for CM to speak with her daughter Michael Maxwell as discharge planning become clearer & took CM contact information to give to daughter as did not have phone # readily available.   CM to follow as care progresses

## 2017-04-03 NOTE — PROGRESS NOTES
Hospitalist Progress Note    Patient: Francheska Pierre MRN: [id number]  CSN: [id number]    YOB: 1950  Age: 77 y.o. Sex: female    DOA: 3/30/2017 LOS:  LOS: 4 days          INR 1.4 today. Hb 9.7.    ongoing pain to RLE, but . Denies chest pain or shortness of breath. Right thigh less swollen and tense today. She remains constipated, no BM for several days. Assessment/Plan     1. Leukocytosis poa resolving - likely reactive in the setting of Iliopsoas muscle hematoma. blood cx (3/30) ngtd and urine cx negative. Completed 5 days of azactam.   2. Chronic AC with coumadin  3. Pyelonephritis (3/30/2017); at admission UA unremarkable. UC negative. Completed 5 days of Azactam.  4. Multiple antibiotic allergies. 5. Cardiomyopathy, nonischemic. widely patent coronary arteries on cardiac cath in 8/96 - csi following. 6. Complete heart block S/p Biventricular implantable cardioverter-defibrillator; device replaced 4/12009 - PM check 3/31/17 with normal function per csi.  7. DM2 with hyperglycemia, complicated by peripheral neuropathy. A1c 8.0. Consulted diabetes educator. Transitioned gtt --> lantus, ssi. 8. Anemia of chronic dz - supplement folate. 9. Hyponatremia better. 10. ckd 3 at baseline, in the setting of atrophic kidney  11. Coumadin coagulopathy with INR 4.3 upon admission - resolved. 15. Fall at home - pt and ot. 13. Dyslipidemia - per Dr. Kojo Serrano office note, she only tolerates Pravastatin. 14. right iliopsoas muscle hematoma - coumadin held. Per IR, no intervention, monitor for infection. Repeat CT done 4/2/17, no significant interval change. 15. Constipation - bowel regimen. 16. Obesity Body mass index is 34.86 kg/(m^2). 17. Urinary retention - DEMARIO perez input appreciated. VT once mobile. 18. Common femoral dvt 9/3/12 - repeat duplex. 19. Left axillary dvt 8/27/12  20. Left knee OA - possible arthroplasty pending weight loss below 200# per Dr. Kamila Daniels. 21.  Hx AICD pocket infection 2012, cared for by Dr. Sandra Marina  22. Avoid chemical dvt prophylaxis  23. Full code. Tele. Pt and ot now that INR <2.     Additional Notes:      Case discussed with:  [x]Patient  []Family  [x]Nursing  [x]Case Management  DVT Prophylaxis:  []Lovenox  []Hep SQ  [x]SCDs  [x]Coumadin   []On Heparin gtt    Vital signs/Intake and Output:  Visit Vitals    /64 (BP 1 Location: Right arm, BP Patient Position: At rest)    Pulse 65    Temp 98.9 °F (37.2 °C)    Resp 20    Wt 101 kg (222 lb 9.6 oz)    SpO2 99%    Breastfeeding No    BMI 34.86 kg/m2     Current Shift:  04/03 0701 - 04/03 1900  In: 240 [P.O.:240]  Out: 350 [Urine:350]  Last three shifts:  04/01 1901 - 04/03 0700  In: -   Out: 1975 [GOUSF:[id number]]    Awake alert and oriented. Nad. Ncat. Perrl. RRR  cta b.l  Obese soft nt nd nabs   : clear urine in perez  No edema. dp 2+ b.l. right upper thigh minimal ttp; softer, less tense than yesterday  No focal deficit  No rash    Medications Reviewed      Labs: Results:       Chemistry Recent Labs      04/03/17 0517 04/02/17 0225 04/01/17   0243   GLU  112*  179*  206*   NA  138  135*  138   K  4.5  4.1  4.1   CL  101  101  100   CO2  30  28  31   BUN  26*  33*  38*   CREA  1.04  1.30  1.61*   CA  9.4  9.1  8.8   AGAP  7  6  7   BUCR  25*  25*  24*      CBC w/Diff Recent Labs      04/03/17 0517 04/02/17 0225 04/01/17   0243   WBC  9.2  10.4  12.3   RBC  3.62*  3.34*  3.60*   HGB  9.7*  8.9*  9.6*   HCT  29.0*  26.5*  28.8*   PLT  314  318  323   GRANS  77*  78*  81*   LYMPH  14*  10*  9*   EOS  2  1  1      Cardiac Enzymes No results for input(s): CPK, CKND1, CARLI in the last 72 hours.     No lab exists for component: CKRMB, TROIP   Coagulation Recent Labs      04/03/17   0517  04/02/17   0225   PTP  16.3*  16.6*   INR  1.4*  1.4*       Lipid Panel Lab Results   Component Value Date/Time    Cholesterol, total 154 07/24/2012 01:00 AM    HDL Cholesterol 48 07/24/2012 01:00 AM    LDL, calculated 90.4 07/24/2012 01:00 AM    VLDL, calculated 15.6 07/24/2012 01:00 AM    Triglyceride 78 07/24/2012 01:00 AM    CHOL/HDL Ratio 3.2 07/24/2012 01:00 AM      BNP No results for input(s): BNPP in the last 72 hours. Liver Enzymes No results for input(s): TP, ALB, TBIL, AP, SGOT, GPT in the last 72 hours. No lab exists for component: DBIL   Thyroid Studies Lab Results   Component Value Date/Time    TSH 1.5 04/19/2013 12:00 AM    TSH 1.92 07/24/2012 01:00 AM        Procedures/imaging: see electronic medical records for all procedures/Xrays and details which were not copied into this note but were reviewed prior to creation of Plan.

## 2017-04-03 NOTE — PROGRESS NOTES
NUTRITION    BPA/MST Referral       RECOMMENDATIONS / PLAN:     - Add supplements: Glucerna Shake TID.   - Continue RD inpatient monitoring and evaluation. NUTRITION INTERVENTIONS & DIAGNOSIS:     [x] Meals/Snacks: modified diet   [x] Medical food supplementation: initiate   [x] Vitamin/mineral supplementation: folic acid    Nutrition Diagnosis: Inadequate energy intake related to decreased appetite as evidenced by poor/variable meal intake, consuming 50% or less of recent meals. Patient meets the AND/ASPEN criteria for Acute Severe Protein Calorie Malnutrition as evidenced by:   Nutritional intake of <50% of recommended intake for >5 days   Weight loss of >1-2% in 1 week, >5% in 1 month, >7.5% in 3 months, or >10% in 6 months     ASSESSMENT:     Subjective/Objective:  Continues to have poor appetite, consuming 50% or less of recent meals. Agreeable to supplements. Denies nausea/vomiting. Bowel regimen started for constipation. Average po intake adequate to meet patients estimated nutritional needs:   [] Yes     [x] No   [] Unable to determine at this time    Diet: DIET DIABETIC WITH OPTIONS Consistent Carb 1500-1600kcal; Regular; No Conc.  Sweets      Food Allergies: blueberries  Current Appetite:   [] Good     [] Fair     [x] Poor     [] Other:  Appetite/meal intake prior to admission:   [] Good     [] Fair     [x] Poor x 1-2 weeks PTA    [] Other:  Feeding Limitations:  [] Swallowing difficulty    [] Chewing difficulty    [] Other:  Current Meal Intake:   Patient Vitals for the past 100 hrs:   % Diet Eaten   04/03/17 0956 50 %   04/01/17 1312 50 %   04/01/17 1015 75 %     BM: 3/29  Skin Integrity: WDL   Edema: none   Pertinent Medications: Reviewed; colace, miralax, dulcolax      Recent Labs      04/03/17   0517  04/02/17   0225  04/01/17   0243   NA  138  135*  138   K  4.5  4.1  4.1   CL  101  101  100   CO2  30  28  31   GLU  112*  179*  206*   BUN  26*  33*  38*   CREA  1.04  1.30  1.61*   CA  9.4 9.1  8.8   MG  2.5*  2.4  2.5*       Intake/Output Summary (Last 24 hours) at 04/03/17 1359  Last data filed at 04/03/17 0956   Gross per 24 hour   Intake              240 ml   Output             1350 ml   Net            -1110 ml       Anthropometrics:  Ht Readings from Last 1 Encounters:   03/14/17 5' 7\" (1.702 m)     Last 3 Recorded Weights in this Encounter    03/31/17 2013 03/31/17 2351 04/02/17 0500   Weight: 101 kg (222 lb 11.2 oz) 105 kg (231 lb 8 oz) 101 kg (222 lb 9.6 oz)     Body mass index is 34.86 kg/(m^2). Weight History:  Patient reported usual body weight is 240 lbs. Stated felt that she had lost weight PTA; clothes fit looser. Noted weight loss of 8 lbs (5.5%) in past 2-4 weeks PTA per chart history    Weight Metrics 4/2/2017 3/30/2017 3/16/2017 3/14/2017 3/13/2017 2/9/2017 1/12/2017   Weight 222 lb 9.6 oz - 240 lb 243 lb 245 lb 245 lb 245 lb   BMI - 34.86 kg/m2 37.59 kg/m2 38.06 kg/m2 38.37 kg/m2 38.37 kg/m2 38.37 kg/m2        Admitting Diagnosis: Iliopsoas muscle hematoma, right, initial encounter  Pyelonephritis  Psoas hematoma, right, secondary to anticoagulant therapy, sequela  Iliopsoas muscle hematoma  Past Medical History:   Diagnosis Date    Acute venous embolism and thrombosis of deep veins of upper extremity ([facility] [address] 9/5/2012    Anemia in chronic kidney disease(285.21) 9/5/2012    Anticoagulated on Coumadin 9/5/2012    Biventricular implantable cardiac defibrillator in situ 4/28/05    upgrade to BiV AICD, gen change 4/08, pocket revision 10/09    Cardiac cath 08/15/1996    Patent coronaries. Elev LVEDP. EF 50-55%.  Cardiac echocardiogram 06/23/2015    Ltd study. EF 45-50%. Mild, diffuse hypk. Severe apical hypk. No mass or thrombus was clearly identified, although imaging was suboptimal.      Cardiac nuclear imaging test 06/19/2015    Fixed distal apical, distal septal defect more likely due to RV pacing than prior infarct. No ischemia. EF 46%. RWMA c/w RV pacing. Nondiagnostic EKG on pharm stress test.      Cardiovascular lower extremity venous duplex 09/04/2012    Acute, non-occlusive DVT in CFV on right. No DVT on left. No superficial thrombosis bilaterally.  Cardiovascular upper extremity venous duplex 08/27/2012    DVT in axillary vein on left. Left subclavian was not visualized.  CHF [facility]congestive heart failure) [facility])     Chronic kidney disease, stage IV (severe) ([facility]) 9/5/2012    Congestive heart failure, unspecified     Diabetes ([address]     Diabetic peripheral neuropathy associated with type 2 diabetes mellitus ([address] 6/28/2011    Difficult airway for intubation 08/22/2012    see anesthesia airway note    Generalized weakness 9/5/2012    Hypercholesterolemia     Hypertension     LBBB (left bundle branch block)     Nonischemic cardiomyopathy     Obesity (BMI 35.0-39.9 without comorbidity) ([address] 3/13/2017    Pacemaker 12/04    status post DDD permanent pacemaker    Peripheral neuropathy ([facility] [address]     secondary due to diabetes    Postoperative anemia due to acute blood loss 9/5/2012    Type II or unspecified type diabetes mellitus with neurological manifestations, not stated as uncontrolled 6/28/2011    Unspecified essential hypertension 9/5/2012       Education Needs:        [x] None identified  [] Identified - Not appropriate at this time  []  Identified and addressed - refer to education log  Learning Limitations:   [x] None identified  [] Identified    Cultural, Samaritan & ethnic food preferences:  [x] None identified    [] Identified and addressed     ESTIMATED NUTRITION NEEDS:     Calories: 6310-6161 kcal (MSJx1.2-1.3) based on  [x] Actual BW: 108 kg      [] IBW   CHO: 248-269 gm (50% kcal)   Protein:  gm (0.8-1 gm/kg) based on  [x] Actual BW      [] IBW   Fluid: 1 mL/kcal     MONITORING & EVALUATION:     Nutrition Goal(s):   1. Po intake of meals will meet >75% of patient estimated nutritional needs within the next 7 days.   Outcome:  [] Met/Ongoing    [x]  Not Met    [] New/Initial Goal     Monitoring:   [x] Diet tolerance   [x] Meal intake   [x] Supplement intake   [] GI symptoms/ability to tolerate po diet   [] Respiratory status   [] Plan of care      Previous Recommendations (for follow-up assessments only):     []   Implemented       []   Not Implemented (RD to address)     [] No Recommendation Made     Discharge Planning: diabetic, cardiac diet  [x] Participated in care planning, discharge planning, & interdisciplinary rounds as appropriate      Brandt Marte, [address]    Pager: [phone]

## 2017-04-03 NOTE — PROGRESS NOTES
[facility]  -  Progress Note      Patient: Abhay Garcia MRN: [id number]  SSN: [id number]    YOB: 1950  Age: 77 y.o. Sex: female      Admit Date: 3/30/2017    Assessment:     Hospital Problems  Date Reviewed: 3/30/2017          Codes Class Noted POA    Pyelonephritis ICD-10-CM: N12  ICD-9-CM: 590.80  3/30/2017 Unknown        * (Principal)Iliopsoas muscle hematoma ICD-10-CM: S70.10XA  ICD-9-CM: 924.00  3/30/2017 Unknown        SIRS (systemic inflammatory response syndrome) ([facility] ICD-10-CM: R65.10  ICD-9-CM: 995.90  3/30/2017 Yes        Psoas hematoma, right, secondary to anticoagulant therapy ICD-10-CM: S30. 1XXA  ICD-9-CM: 924.9, E934.2  3/30/2017 Unknown        Cardiomyopathy, nonischemic (HCC) ICD-10-CM: I42.8  ICD-9-CM: 425.4  6/28/2011 Yes        Biventricular implantable cardioverter-defibrillator in situ ICD-10-CM: Z95.810  ICD-9-CM: V45.02  6/28/2011 Yes    Overview Signed 9/5/2012  5:31 PM by Malaika Naidu MD     Abdominal - done on 8/22/2012 by Dr. Ofe Calvillo             Diabetic peripheral neuropathy associated with type 2 diabetes mellitus ([facility] (Chronic) ICD-10-CM: E11.42  ICD-9-CM: 250.60, 357.2  6/28/2011 Yes            -Iliopsoas and psoas hematoma secondary to fall while on Coumadin. INR 2.5 today, 4.3 yesterday. Phytonadione 5mg given 3/30/17 at 2105 with favorable decrease in INR level.   -Chronic dilated nonischemic cardiomyopathy - appears stable. TTE 6/23/15 showed EF 45-50%, mild diffuse hypokinesis, severe hypokinesis of the apical wall. Nuculear medicine cardiac stress with Lexiscan 5/42/53 showed systolic function was mildly depressed with EF 46% with apical and septal was dyskinesis likely from RV pacing.  -Biventricular pacemaker in LUQ - Device interrogation on 12/29/16 showed iImpedance and threshold within normal limits. No atrial high rate episodes, 1 NSVT episode lasting 1 second.  A paced 20% V paced 100% Estimated battery longevity 1.5 years.  -Dyslipidemia - per Dr. Sulma Scott office note, patient only able to tolerate Pravastatin. Continue with pravastatin 40mg. -DM type II - poorly controlled, on insulin. Hemoglobin A1c 8.0.   -HTN - carvedilol 25mg bid.   -Anemia - hgb 9.5, hct 27.7 today, slightly down from yesterday at 11.4 and 33. Continue to monitor as patient is anti-coagulated and with hematoma.   -Chronic coumadin use since 2012 when she developed upper extremity DVT  -CKD, stage III - BUN 41, Crt 1.88, GFR 32 today. Renal function seems stable compared to baseline.   -Obesity    Cardiologist is Dr. Alma Keith. Plan:     Stable  No change to CT and bleed from yesterday. Continue with pain meds as needed. No pyelonephritis as urine clear and pain related to iliopsoas bleed     Subjective:     Has continued pain which seems to be staying under reasonable control with pain meds. No new complaints. Objective:      No data found.         Patient Vitals for the past 96 hrs:   Weight   04/02/17 0500 101 kg (222 lb 9.6 oz)   03/31/17 2351 105 kg (231 lb 8 oz)   03/31/17 2013 101 kg (222 lb 11.2 oz)   03/30/17 1156 107.5 kg (237 lb)         Intake/Output Summary (Last 24 hours) at 04/03/17 0825  Last data filed at 04/03/17 0240   Gross per 24 hour   Intake                0 ml   Output             1375 ml   Net            -1375 ml       Physical Exam:  General:  alert, cooperative, no distress, appears stated age  Neck:  no JVD  Lungs:  clear to auscultation bilaterally  Heart: regular rhythm, systolic murmur: early systolic 3/6, crescendo at 2nd right intercostal space  Extremities:  extremities normal, atraumatic, no cyanosis or edema    Data Review:     Labs: Results:       Chemistry Recent Labs      04/03/17   0517  04/02/17   0225  04/01/17   0243   GLU  112*  179*  206*   NA  138  135*  138   K  4.5  4.1  4.1   CL  101  101  100   CO2  30  28  31   BUN  26*  33*  38*   CREA  1.04  1.30  1.61*   CA  9.4  9.1  8.8   MG  2.5*  2.4  2.5* AGAP  7  6  7   BUCR  25*  25*  24*      CBC w/Diff Recent Labs      04/03/17 0517 04/02/17 0225 04/01/17   0243   WBC  9.2  10.4  12.3   RBC  3.62*  3.34*  3.60*   HGB  9.7*  8.9*  9.6*   HCT  29.0*  26.5*  28.8*   PLT  314  318  323   GRANS  77*  78*  81*   LYMPH  14*  10*  9*   EOS  2  1  1      Cardiac Enzymes No results found for: CPK, CKMMB, CKMB, RCK3, CKMBT, CKNDX, CKND1, CARLI, TROPT, TROIQ, AMBERLY, TROPT, TNIPOC, BNP, BNPP   Coagulation Recent Labs      04/03/17 0517 04/02/17 0225   PTP  16.3*  16.6*   INR  1.4*  1.4*       Lipid Panel Lab Results   Component Value Date/Time    Cholesterol, total 154 07/24/2012 01:00 AM    HDL Cholesterol 48 07/24/2012 01:00 AM    LDL, calculated 90.4 07/24/2012 01:00 AM    VLDL, calculated 15.6 07/24/2012 01:00 AM    Triglyceride 78 07/24/2012 01:00 AM    CHOL/HDL Ratio 3.2 07/24/2012 01:00 AM      BNP Lab Results   Component Value Date/Time    B-type Natriuretic Peptide 111.9 11/19/2013 10:17 AM      Liver Enzymes No results for input(s): TP, ALB, TBIL, AP, SGOT, GPT in the last 72 hours.     No lab exists for component: DBIL   Digoxin    Thyroid Studies Lab Results   Component Value Date/Time    TSH 1.5 04/19/2013 12:00 AM    TSH 1.92 07/24/2012 01:00 AM

## 2017-04-03 NOTE — PROGRESS NOTES
Progress Note    Patient: Patricia Hicks MRN: [id number]  SSN: [id number]    YOB: 1950  Age: 77 y.o.   Sex: female      Admit Date: 3/30/2017    LOS: 4 days     Subjective:     still  c/o pain RLQ  no n/v  urine clear    Objective:     Vitals:    04/02/17 1601 04/02/17 2000 04/03/17 0000 04/03/17 0822   BP: 149/68 129/64 127/67 155/79   Pulse: 71 67 64 72   Resp: 20 18 18 20   Temp: 99.1 °F (37.3 °C) 98.1 °F (36.7 °C) 98.4 °F (36.9 °C) 98.1 °F (36.7 °C)   SpO2: 98% 98% 97% 98%   Weight:            Intake and Output:  Current Shift:    Last three shifts: 04/01 1901 - 04/03 0700  In: -   Out: 1975 [Urine:1975]    Current Facility-Administered Medications   Medication Dose Route Frequency    bisacodyl (DULCOLAX) suppository 10 mg  10 mg Rectal DAILY PRN    folic acid (FOLVITE) tablet 1 mg  1 mg Oral DAILY    insulin glargine (LANTUS) injection 24 Units  24 Units SubCUTAneous Q12H    docusate sodium (COLACE) capsule 100 mg  100 mg Oral BID    polyethylene glycol (MIRALAX) packet 17 g  17 g Oral DAILY    carvedilol (COREG) tablet 12.5 mg  12.5 mg Oral BID    insulin lispro (HUMALOG) injection   SubCUTAneous AC&HS    glucose chewable tablet 16 g  16 g Oral PRN    glucagon (GLUCAGEN) injection 1 mg  1 mg IntraMUSCular PRN    dextrose (D50W) injection syrg 12.5-25 g  25-50 mL IntraVENous PRN    acetaminophen (TYLENOL) tablet 500 mg  500 mg Oral Q6H PRN    oxyCODONE-acetaminophen (PERCOCET) 5-325 mg per tablet 1 Tab  1 Tab Oral Q6H PRN    allopurinol (ZYLOPRIM) tablet 100 mg  100 mg Oral BID    gabapentin (NEURONTIN) capsule 300 mg  300 mg Oral BID    aztreonam (AZACTAM) 1 g in 0.9% sodium chloride (MBP/ADV) 100 mL MBP  1 g IntraVENous Q8H    HYDROmorphone (PF) (DILAUDID) injection 1 mg  1 mg IntraVENous Q4H PRN    ondansetron (ZOFRAN) injection 4 mg  4 mg IntraVENous Q6H PRN    0.9% sodium chloride infusion 250 mL  250 mL IntraVENous PRN         Physical Exam:   GENERAL: alert, cooperative, no distress, appears stated age  ABDOMEN: soft, non-tender. Bowel sounds normal. No masses,  no organomegaly        Lab/Data Review:  BMP:   Lab Results   Component Value Date/Time     04/03/2017 05:17 AM    K 4.5 04/03/2017 05:17 AM     04/03/2017 05:17 AM    CO2 30 04/03/2017 05:17 AM    AGAP 7 04/03/2017 05:17 AM     (H) 04/03/2017 05:17 AM    BUN 26 (H) 04/03/2017 05:17 AM    CREA 1.04 04/03/2017 05:17 AM    GFRAA >60 04/03/2017 05:17 AM    GFRNA 53 (L) 04/03/2017 05:17 AM     CMP:   Lab Results   Component Value Date/Time     04/03/2017 05:17 AM    K 4.5 04/03/2017 05:17 AM     04/03/2017 05:17 AM    CO2 30 04/03/2017 05:17 AM    AGAP 7 04/03/2017 05:17 AM     (H) 04/03/2017 05:17 AM    BUN 26 (H) 04/03/2017 05:17 AM    CREA 1.04 04/03/2017 05:17 AM    GFRAA >60 04/03/2017 05:17 AM    GFRNA 53 (L) 04/03/2017 05:17 AM    CA 9.4 04/03/2017 05:17 AM    MG 2.5 (H) 04/03/2017 05:17 AM     CBC:   Lab Results   Component Value Date/Time    WBC 9.2 04/03/2017 05:17 AM    HGB 9.7 (L) 04/03/2017 05:17 AM    HCT 29.0 (L) 04/03/2017 05:17 AM     04/03/2017 05:17 AM          CT Results:    Results from Hospital Encounter encounter on 03/30/17   CT ABD PELV WO CONT   Narrative CT scan of abdomen and pelvis, without intravenous contrast:        INDICATION:    History of right iliopsoas muscle hematoma. Follow-up evaluation. TECHNIQUE:    Multidetector CT scan, with 5 mm size thickness, without intravenous contrast,  without oral contrast, including abdomen and pelvis. Sagittal and coronal images reformatted. All CT scans at this facility are performed using dose optimization technique as  appropriate to a  performed  examination, to include automated exposer control,  adjustment mA and / or  KV according to patient size (including appropriate  matching  for site specific examination), or use  of iterative  reconstruction  technique.     COMPARISON STUDY: CT scan of abdomen and pelvis on 3/30/2017. FINDINGS:        CT ABDOMEN:        At the lower portions of lower lobes of both lungs there are moderate  atelectatic changes, and/or infiltrates, which are new finding as compared to  previous study. There is trace amount of pleural effusion in left posterior CP  gutter. There is no obvious right pleural effusion. There is no evidence of pneumoperitoneum. No definable focal abnormality in liver. Status post cholecystectomy redemonstrated. Spleen appears to be top normal size. The adrenal glands are normal bilaterally. At the lower pole of left kidney there is a mass, which is hypodense, likely to  be a cyst, measuring 5.2 cm transverse and 5.3 cm in AP with a vertical  dimension of 6.6 cm, as also noted on previous study. In the mid posterior  portion of left kidney there is a mass, 2.2 cm in diameter. This mass is of  intermediate density and may be solid or complex cyst.    At the upper lateral aspect of right kidney there is a small mass, or  intermediate density, measuring 1.05 cm in diameter. The abdominal aorta is of normal size with moderate calcified plaques. There is  no definable periaortic or mesenteric lymphadenopathy. There is no ascites. Loops of small bowel are not abnormally distended or dilated. Small amount of  nonspecific gas is scattered in some loops of distal small bowel. There is normal appendix in pelvic position. The study shows moderate amount of stool in right side of colon. Transverse  colon contains moderate gas. Descending colon contains small amount of gas and  minimal stool. On previous study there was demonstration of enlarged right psoas muscle with  hypodensity in the muscle. The finding is likely to be due to hematoma in the  muscle.  On current study, again there is mild hypodensity in the medial portion  of the muscle extending from upper limit of the muscle to the lower limit of  right psoas muscle. This hypodense area measures about 4.5 cm transverse, 2  about 3.6 cm AP and 18 cm in vertical extent. Allowances being made for  differences in technique the measurements, there is no obvious interval change  in the right psoas muscle. The finding is likely to be hematoma in right psoas  muscle. There is no definable hematoma in the right iliacus  muscle. There is no evidence of any hematoma or any other focal abnormality in the left  psoas muscle. CT PELVIS:        There is no abnormal fluid collection or inflammation in pelvis. Status post  hysterectomy demonstrated. There is no definable pelvic mass or pathologic lymphadenopathy. There is small  to moderate amount of gas in proximal sigmoid colon. Rectum contains moderate  stool. There is no evidence of colonic diverticulosis/diverticulitis. No evidence of inguinal, femoral or ventral hernia. In the inguinal areas of both side there are a few mildly prominent lymph nodes,  which are nonspecific and unchanged. There are some postoperative dystrophic calcifications noted in lower pelvis,  superior to vaginal vault. In bony structures of pelvis and lumbar spine there is no definable focal  lesion. There are mild-to-moderate multilevel spondylolytic changes in lumbar  spine.    -----------------------------------------------------------------         Impression IMPRESSION:    Redemonstration of prominent hematoma within the right psoas muscle, essentially  unchanged as compared to previous study on 3/13/2017. Around right psoas muscle  there are are some hazy densities, indicating mild reactive or congestive  changes similar to previous study. Otherwise, there is no definable confluent  inflammatory phlegmon around right psoas muscle. There is no evidence of hematoma in left psoas muscle or in rest of the  retroperitoneum.     Since last study on 3/30/2017, there have developed moderate atelectatic  changes, and/or pneumonic infiltrates in the lower portions of lower lobes of  both lungs. US Results:    [facility]   US THYROID/PARATHYROID/SOFT TISS   Narrative THYROID ULTRASOUND    CPT CODE: 49682    COMPARISON: None. INDICATIONS: Goiter    TECHNIQUE: Real time and color doppler imaging is performed. FINDINGS: There is a hypoechoic nodule in the upper pole of the right lobe  measuring 1.5 x 1.2 x 0.8 cm. A second lower pole right lobe nodule is also  hypoechoic and measures 10 x 7 x 10 mm. These are relatively hypovascular. There is a hypoechoic nodule in the midpole of the left lobe measuring 5 x 5 x  5 mm, with internal echoes. Another measures 6 x 6 x 4 mm and is also complex. In the right lobe measures 5.9 x 2.5 x 1.9 cm and the left lobe 5.4 x 2.3 x 2.7  cm. Impression IMPRESSION:    Small bilateral solid thyroid nodules as described above.           Assessment:     Principal Problem:    Iliopsoas muscle hematoma (3/30/2017)    Active Problems:    Cardiomyopathy, nonischemic (HCC) (6/28/2011)      Biventricular implantable cardioverter-defibrillator in situ (6/28/2011)      Overview: Abdominal - done on 8/22/2012 by Dr. Hanna Allen      Diabetic peripheral neuropathy associated with type 2 diabetes mellitus ([facility] (6/28/2011)      Pyelonephritis (3/30/2017)      SIRS (systemic inflammatory response syndrome) ([facility] (3/30/2017)      Psoas hematoma, right, secondary to anticoagulant therapy (3/30/2017)        Plan:     will  keep the perez in until  oob and more comfortable      Signed By: Ruperto Reyes MD , FACS    April 3, 2017      PAGER:  Frederic 8:  19 Analy Anne:  [id number] [facility]   [phone]

## 2017-04-04 LAB
ANION GAP BLD CALC-SCNC: 6 MMOL/L (ref 3–18)
BASOPHILS # BLD AUTO: 0 K/UL (ref 0–0.1)
BASOPHILS # BLD: 0 % (ref 0–2)
BUN SERPL-MCNC: 27 MG/DL (ref 7–18)
BUN/CREAT SERPL: 25 (ref 12–20)
CALCIUM SERPL-MCNC: 8.5 MG/DL (ref 8.5–10.1)
CHLORIDE SERPL-SCNC: 99 MMOL/L (ref 100–108)
CO2 SERPL-SCNC: 30 MMOL/L (ref 21–32)
CREAT SERPL-MCNC: 1.08 MG/DL (ref 0.6–1.3)
DIFFERENTIAL METHOD BLD: ABNORMAL
EOSINOPHIL # BLD: 0.2 K/UL (ref 0–0.4)
EOSINOPHIL NFR BLD: 2 % (ref 0–5)
ERYTHROCYTE [DISTWIDTH] IN BLOOD BY AUTOMATED COUNT: 14.9 % (ref 11.6–14.5)
GLUCOSE BLD STRIP.AUTO-MCNC: 112 MG/DL (ref 70–110)
GLUCOSE BLD STRIP.AUTO-MCNC: 127 MG/DL (ref 70–110)
GLUCOSE BLD STRIP.AUTO-MCNC: 135 MG/DL (ref 70–110)
GLUCOSE BLD STRIP.AUTO-MCNC: 80 MG/DL (ref 70–110)
GLUCOSE SERPL-MCNC: 66 MG/DL (ref 74–99)
HCT VFR BLD AUTO: 27.7 % (ref 35–45)
HGB BLD-MCNC: 9.3 G/DL (ref 12–16)
INR PPP: 1.3 (ref 0.8–1.2)
LYMPHOCYTES # BLD AUTO: 17 % (ref 21–52)
LYMPHOCYTES # BLD: 1.6 K/UL (ref 0.9–3.6)
MCH RBC QN AUTO: 26.8 PG (ref 24–34)
MCHC RBC AUTO-ENTMCNC: 33.6 G/DL (ref 31–37)
MCV RBC AUTO: 79.8 FL (ref 74–97)
MONOCYTES # BLD: 0.9 K/UL (ref 0.05–1.2)
MONOCYTES NFR BLD AUTO: 10 % (ref 3–10)
NEUTS SEG # BLD: 6.4 K/UL (ref 1.8–8)
NEUTS SEG NFR BLD AUTO: 71 % (ref 40–73)
PLATELET # BLD AUTO: 312 K/UL (ref 135–420)
PMV BLD AUTO: 9.7 FL (ref 9.2–11.8)
POTASSIUM SERPL-SCNC: 3.9 MMOL/L (ref 3.5–5.5)
PROTHROMBIN TIME: 16 SEC (ref 11.5–15.2)
RBC # BLD AUTO: 3.47 M/UL (ref 4.2–5.3)
SODIUM SERPL-SCNC: 135 MMOL/L (ref 136–145)
WBC # BLD AUTO: 9.2 K/UL (ref 4.6–13.2)

## 2017-04-04 RX ADMIN — GABAPENTIN 300 MG: 300 CAPSULE ORAL at 17:52

## 2017-04-04 RX ADMIN — INSULIN GLARGINE 24 UNITS: 100 INJECTION, SOLUTION SUBCUTANEOUS at 21:28

## 2017-04-04 RX ADMIN — HYDROMORPHONE HYDROCHLORIDE 1 MG: 1 INJECTION, SOLUTION INTRAMUSCULAR; INTRAVENOUS; SUBCUTANEOUS at 10:56

## 2017-04-04 RX ADMIN — HYDROMORPHONE HYDROCHLORIDE 1 MG: 1 INJECTION, SOLUTION INTRAMUSCULAR; INTRAVENOUS; SUBCUTANEOUS at 21:23

## 2017-04-04 RX ADMIN — ALLOPURINOL 100 MG: 100 TABLET ORAL at 08:06

## 2017-04-04 RX ADMIN — CARVEDILOL 12.5 MG: 12.5 TABLET, FILM COATED ORAL at 08:06

## 2017-04-04 RX ADMIN — DOCUSATE SODIUM 100 MG: 100 CAPSULE, LIQUID FILLED ORAL at 17:52

## 2017-04-04 RX ADMIN — DOCUSATE SODIUM 100 MG: 100 CAPSULE, LIQUID FILLED ORAL at 08:06

## 2017-04-04 RX ADMIN — ALLOPURINOL 100 MG: 100 TABLET ORAL at 17:53

## 2017-04-04 RX ADMIN — FOLIC ACID 1 MG: 1 TABLET ORAL at 08:06

## 2017-04-04 RX ADMIN — INSULIN GLARGINE 24 UNITS: 100 INJECTION, SOLUTION SUBCUTANEOUS at 08:10

## 2017-04-04 RX ADMIN — GABAPENTIN 300 MG: 300 CAPSULE ORAL at 08:06

## 2017-04-04 RX ADMIN — OXYCODONE HYDROCHLORIDE AND ACETAMINOPHEN 1 TABLET: 5; 325 TABLET ORAL at 08:06

## 2017-04-04 RX ADMIN — HYDROMORPHONE HYDROCHLORIDE 1 MG: 1 INJECTION, SOLUTION INTRAMUSCULAR; INTRAVENOUS; SUBCUTANEOUS at 17:52

## 2017-04-04 RX ADMIN — OXYCODONE HYDROCHLORIDE AND ACETAMINOPHEN 1 TABLET: 5; 325 TABLET ORAL at 14:56

## 2017-04-04 RX ADMIN — HYDROMORPHONE HYDROCHLORIDE 1 MG: 1 INJECTION, SOLUTION INTRAMUSCULAR; INTRAVENOUS; SUBCUTANEOUS at 05:11

## 2017-04-04 RX ADMIN — CARVEDILOL 12.5 MG: 12.5 TABLET, FILM COATED ORAL at 17:52

## 2017-04-04 NOTE — PROCEDURES
[facility]  *** FINAL REPORT ***    Name: Nara Lowry  MRN: [id number]    Inpatient  : 1950  HIS Order #: [id number]  [id number] [address] [facility] Visit #: [id number]  Date: 2017    TYPE OF TEST: Peripheral Venous Testing    REASON FOR TEST    Right Leg:-  Deep venous thrombosis:           No  Superficial venous thrombosis:    No  Deep venous insufficiency:        Not examined  Superficial venous insufficiency: Not examined    Left Leg:-  Deep venous thrombosis:           No  Superficial venous thrombosis:    No  Deep venous insufficiency:        Not examined  Superficial venous insufficiency: Not examined      INTERPRETATION/FINDINGS  Duplex images were obtained using 2-D gray scale, color flow, and  spectral Doppler analysis. Right le. No evidence of deep venous thrombosis detected in the veins  visualized. 2. Deep veins visualized include the common femoral, femoral,  popliteal, posterior tibial and peroneal veins. 3. No evidence of superficial thrombosis detected. 4. Superficial veins visualized include the proximal great saphenous  vein. Left le. No evidence of deep venous thrombosis detected in the veins  visualized. 2. Deep veins visualized include the common femoral, femoral,  popliteal, posterior tibial and peroneal veins. 3. No evidence of superficial thrombosis detected. 4. Superficial veins visualized include the proximal great saphenous  vein. ADDITIONAL COMMENTS    I have personally reviewed the data relevant to the interpretation of  this  study.     TECHNOLOGIST: Titus Murdock RVT  Signed: 2017 03:49 PM    PHYSICIAN: Kristian Fleming MD  Signed: 2017 05:16 PM

## 2017-04-04 NOTE — PROGRESS NOTES
Hospitalist Progress Note    Patient: Sandra Michael MRN: [id number]  CSN: [id number]    YOB: 1950  Age: 77 y.o. Sex: female    DOA: 3/30/2017 LOS:  LOS: 5 days          Had a BM yesterday, pasty, large per nsg. INR 1.3 today. Hb 9.3. Duplex LE complete, report pending. Await pt and ot. she denies chest pain or sob. Had no appetite at breakfast but eating some lunch. Feels like she may need an enema later. Still having pain to both upper thighs. Got up to chair yesterday and today. She agrees she may need rehab, also her  recently had surgery. PT [place] entering room. Assessment/Plan     1. Leukocytosis poa resolving - likely reactive in the setting of Iliopsoas muscle hematoma. blood cx (3/30) ngtd and urine cx negative. Completed 5 days of azactam.   2. Chronic AC with coumadin  3. Pyelonephritis (3/30/2017); at admission UA unremarkable. UC negative. Completed 5 days of Azactam.  4. Multiple antibiotic allergies. 5. Cardiomyopathy, nonischemic. widely patent coronary arteries on cardiac cath in 8/96 - csi following. 6. Complete heart block S/p Biventricular implantable cardioverter-defibrillator; device replaced 4/12009 - PM check 3/31/17 with normal function per csi.  7. DM2 with hyperglycemia, complicated by peripheral neuropathy. A1c 8.0. Consulted diabetes educator. Transitioned gtt --> lantus, ssi. 8. Anemia of chronic dz - supplement folate. 9. Hyponatremia better. 10. ckd 3 at baseline, in the setting of atrophic kidney  11. Coumadin coagulopathy with INR 4.3 upon admission - resolved. 15. Fall at home - pt and ot. 13. Dyslipidemia - per Dr. Fabian Cerrato office note, she only tolerates Pravastatin. 14. right iliopsoas muscle hematoma - coumadin held. Per IR, no intervention, monitor for infection. Repeat CT done 4/2/17, no significant interval change. 15. Constipation - bowel regimen. 16. Obesity Body mass index is 34.86 kg/(m^2).   17. Urinary retention - perez,  input appreciated. VT once mobile. 18. Common femoral dvt 9/3/12 - repeat duplex done, follow report. 19. 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 Pramod Niño, Dr. Anita Long, Dr. Tiffany Guillory. 20. Left knee OA - possible arthroplasty pending weight loss below 200# per Dr. Kelly Sanon. 21. Avoid chemical dvt prophylaxis  22. Full code. Tele. Pt and ot. Additional Notes:      Case discussed with:  [x]Patient  []Family  [x]Nursing  [x]Case Management  DVT Prophylaxis:  []Lovenox  []Hep SQ  [x]SCDs  [x]Coumadin   []On Heparin gtt    Vital signs/Intake and Output:  Visit Vitals    /71 (BP 1 Location: Left arm, BP Patient Position: At rest)    Pulse 63    Temp 99.3 °F (37.4 °C)    Resp 18    Wt 101 kg (222 lb 9.6 oz)    SpO2 99%    Breastfeeding No    BMI 34.86 kg/m2     Current Shift:  04/04 0701 - 04/04 1900  In: -   Out: 190 [Urine:190]  Last three shifts:  04/02 1901 - 04/04 0700  In: 240 [P.O.:240]  Out: 6185 [Urine:1475]    Awake alert and oriented. Nad. Sitting up in chair eating lunch. Ncat. Perrl. RRR  cta b.l  Obese soft nt nd nabs   : clear urine in perez  No edema. dp 2+ b.l. right upper thigh minimal ttp; softer, less tense. No focal deficit  No rash    Medications Reviewed      Labs: Results:       Chemistry Recent Labs      04/03/17 0517 04/02/17 0225   GLU  112*  179*   NA  138  135*   K  4.5  4.1   CL  101  101   CO2  30  28   BUN  26*  33*   CREA  1.04  1.30   CA  9.4  9.1   AGAP  7  6   BUCR  25*  25*      CBC w/Diff Recent Labs      04/04/17   0300  04/03/17   0517 04/02/17 0225   WBC  9.2  9.2  10.4   RBC  3.47*  3.62*  3.34*   HGB  9.3*  9.7*  8.9*   HCT  27.7*  29.0*  26.5*   PLT  312  314  318   GRANS  71  77*  78*   LYMPH  17*  14*  10*   EOS  2  2  1      Cardiac Enzymes No results for input(s): CPK, CKND1, CARLI in the last 72 hours.     No lab exists for component: Zachery Rodriguez Coagulation Recent Labs      04/04/17   0300  04/03/17   0517   PTP  16.0*  16.3*   INR  1.3*  1.4*       Lipid Panel Lab Results   Component Value Date/Time    Cholesterol, total 154 07/24/2012 01:00 AM    HDL Cholesterol 48 07/24/2012 01:00 AM    LDL, calculated 90.4 07/24/2012 01:00 AM    VLDL, calculated 15.6 07/24/2012 01:00 AM    Triglyceride 78 07/24/2012 01:00 AM    CHOL/HDL Ratio 3.2 07/24/2012 01:00 AM      BNP No results for input(s): BNPP in the last 72 hours. Liver Enzymes No results for input(s): TP, ALB, TBIL, AP, SGOT, GPT in the last 72 hours. No lab exists for component: DBIL   Thyroid Studies Lab Results   Component Value Date/Time    TSH 1.5 04/19/2013 12:00 AM    TSH 1.92 07/24/2012 01:00 AM        Procedures/imaging: see electronic medical records for all procedures/Xrays and details which were not copied into this note but were reviewed prior to creation of Plan.

## 2017-04-04 NOTE — PROGRESS NOTES
Problem: Self Care Deficits Care Plan (Adult)  Goal: *Acute Goals and Plan of Care (Insert Text)  Occupational Therapy Goals  Initiated 4/4/2017 within 7 day(s). 1. Patient will perform lower body dressing with supervision/set-up, AE prn.   2. Patient will perform toilet transfers with supervision/set-up. 3. Patient will perform all aspects of toileting with supervision/set-up. 4. Patient will participate in upper extremity therapeutic exercise/activities with supervision/set-up for 8 minutes to increase strength/endurance for ADLs. Outcome: Progressing Towards Goal  OCCUPATIONAL THERAPY EVALUATION     Patient: Georges Dougherty (2 y.o. female)  Date: 4/4/2017  Primary Diagnosis: Iliopsoas muscle hematoma, right, initial encounter  Pyelonephritis  Psoas hematoma, right, secondary to anticoagulant therapy, sequela  Iliopsoas muscle hematoma        Precautions:   Fall      ASSESSMENT :  Based on the objective data described below, the patient presents with decreased ADLs, decreased functional mobility and muscle weakness, complicated by muscle pain in pelvis. Patient unable to reach her feet for LB ADLs and requires min assist for functional standing/transfers. She has a supportive  at home and has needed DME for bathroom safety. Patient will benefit from skilled intervention to address the above impairments.   Patients rehabilitation potential is considered to be Good  Factors which may influence rehabilitation potential include:   [ ]             None noted  [ ]             Mental ability/status  [X]             Medical condition  [ ]             Home/family situation and support systems  [ ]             Safety awareness  [ ]             Pain tolerance/management  [ ]             Other:        PLAN :  Recommendations and Planned Interventions:  [X]               Self Care Training                  [X]        Therapeutic Activities  [X]               Functional Mobility Training    [ ] Cognitive Retraining  [X]               Therapeutic Exercises           [X]        Endurance Activities  [X]               Balance Training                   [ ]        Neuromuscular Re-Education  [ ]               Visual/Perceptual Training     [X]   Home Safety Training  [X]               Patient Education                 [X]        Family Training/Education  [ ]               Other (comment):     Frequency/Duration: Patient will be followed by occupational therapy 1-2 times per day/4-7 days per week to address goals. Discharge Recommendations: None  Further Equipment Recommendations for Discharge: N/A       PATIENT COMPLEXITY      Eval Complexity: History: MEDIUM Complexity : Expanded review of history including physical, cognitive and psychosocial  history ; Examination: MEDIUM Complexity : 3-5 performance deficits relating to physical, cognitive , or psychosocial skils that result in activity limitations and / or participation restrictions; Decision Making:MEDIUM Complexity : Patient may present with comorbidities that affect occupational performnce. Miniml to moderate modification of tasks or assistance (eg, physical or verbal ) with assesment(s) is necessary to enable patient to complete evaluation  Assessment: Medium Complexity       G-CODES:      Self Care  Current  CK= 40-59%   Goal  CI= 1-19%. The severity rating is based on the Level of Assistance required for Functional Mobility and ADLs. SUBJECTIVE:   Patient stated I'm thinking about getting in the chair.       OBJECTIVE DATA SUMMARY:       Past Medical History:   Diagnosis Date    Acute venous embolism and thrombosis of deep veins of upper extremity ([address] 9/5/2012    Anemia in chronic kidney disease(285[address]) 9/5/2012    Anticoagulated on Coumadin 9/5/2012    Biventricular implantable cardiac defibrillator in situ 4/28/05     upgrade to BiV AICD, gen change 4/08, pocket revision 10/09    Cardiac cath 08/15/1996     Patent coronaries. Elev LVEDP. EF 50-55%.  Cardiac echocardiogram 06/23/2015     Ltd study. EF 45-50%. Mild, diffuse hypk. Severe apical hypk. No mass or thrombus was clearly identified, although imaging was suboptimal.      Cardiac nuclear imaging test 06/19/2015     Fixed distal apical, distal septal defect more likely due to RV pacing than prior infarct. No ischemia. EF 46%. RWMA c/w RV pacing. Nondiagnostic EKG on pharm stress test.      Cardiovascular lower extremity venous duplex 09/04/2012     Acute, non-occlusive DVT in CFV on right. No DVT on left. No superficial thrombosis bilaterally.  Cardiovascular upper extremity venous duplex 08/27/2012     DVT in axillary vein on left. Left subclavian was not visualized.     CHF (congestive heart failure) ([facility])      Chronic kidney disease, stage IV (severe) ([facility]) 9/5/2012    Congestive heart failure, unspecified      Diabetes ([address]      Diabetic peripheral neuropathy associated with type 2 diabetes mellitus ([address] 6/28/2011    Difficult airway for intubation 08/22/2012     see anesthesia airway note    Generalized weakness 9/5/2012    Hypercholesterolemia      Hypertension      LBBB (left bundle branch block)      Nonischemic cardiomyopathy      Obesity (BMI 35.0-39.9 without comorbidity) ([address] 3/13/2017    Pacemaker 12/04     status post DDD permanent pacemaker    Peripheral neuropathy ([address]       secondary due to diabetes    Postoperative anemia due to acute blood loss 9/5/2012    Type II or unspecified type diabetes mellitus with neurological manifestations, not stated as uncontrolled 6/28/2011    Unspecified essential hypertension 9/5/2012     Past Surgical History:   Procedure Laterality Date    HX CARPAL TUNNEL RELEASE   4/07     right     HX CHOLECYSTECTOMY   1994    HX HYSTERECTOMY   1973    HX OTHER SURGICAL   6/11/2012     AICD revision    HX PACEMAKER   4/28/2005     [employer] AICD     Prior Level of Function/Home Situation: Pt was independent with basic self care tasks and used a cane outside for functional mobility PTA. Home Situation  Home Environment: Private residence  # Steps to Enter: 3  One/Two Story Residence: One story  Living Alone: No  Support Systems: Child(lea), Family member(s), Friends \ neighbors  Patient Expects to be Discharged to[de-identified] Unknown  Current DME Used/Available at Home: Glucometer, Cane, straight  Tub or Shower Type: Shower (with seat)  [X]  Right hand dominant          [ ]  Left hand dominant  Cognitive/Behavioral Status:  Neurologic State: Alert  Orientation Level: Oriented X4  Cognition: Appropriate decision making; Follows commands  Safety/Judgement: Awareness of environment; Fall prevention     Skin: Intact on UEs     Edema: None noted in UEs     Vision/Perceptual:    Acuity: Within Defined Limits       Coordination:  Fine Motor Skills-Upper: Left Intact; Right Intact    Gross Motor Skills-Upper: Left Intact; Right Intact     Balance:  Sitting: Intact  Standing: With support     Strength:  Strength: Generally decreased, functional (UEs; 4/5 throughout )     Tone & Sensation:  Sensation: Intact (UEs)     Range of Motion:  AROM: Within functional limits (UEs)     Functional Mobility and Transfers for ADLs:  Bed Mobility:  Supine to Sit:  (Patient seated on EOB upon arrival.)  Transfers:  Sit to Stand: Minimum assistance              Toilet Transfer : Minimum assistance     ADL Assessment:  Feeding: Independent     Oral Facial Hygiene/Grooming: Setup     Bathing: Moderate assistance     Upper Body Dressing: Setup     Lower Body Dressing: Moderate assistance     Toileting: Total assistance (perez catheter)     Pain:  Pt reports 8/10 pain or discomfort prior to treatment, in right pelvis. Pain meds recently given by nursing. Pt reports 8/10 pain or discomfort post treatment, in right pelvis. Patient resting in chair at end of session.       Activity Tolerance:   Good     Please refer to the flowsheet for vital signs taken during this treatment. After treatment:   [X] Patient left in no apparent distress sitting up in chair  [ ] Patient left in no apparent distress in bed  [X] Call bell left within reach  [ ] Nursing notified  [ ] Caregiver present  [ ] Bed alarm activated      COMMUNICATION/EDUCATION:   [X] Home safety education was provided and the patient/caregiver indicated understanding. [X] Patient/family have participated as able in goal setting and plan of care. [X] Patient/family agree to work toward stated goals and plan of care. [ ] Patient understands intent and goals of therapy, but is neutral about his/her participation. [ ] Patient is unable to participate in goal setting and plan of care.      Thank you for this referral.  Azael Thakkar MS OTR/L  Time Calculation: 25 mins

## 2017-04-04 NOTE — PROGRESS NOTES
Problem: Mobility Impaired (Adult and Pediatric)  Goal: *Acute Goals and Plan of Care (Insert Text)  STGs to be addressed within 3 days:  1. Bed mobility: Supine to sit to supine S with HR for meals. 2. Activity tolerance: Tolerate up in chair 1-2 hrs for ADLs. 3. Transfers: Sit to stand to chair S with LRAD for ADLs. LTGs to be addressed within 7 days:  1. Standing/Ambulation Balance: Increase to Good with LRAD for safe transfers and gait. 2. Ambulation: Ambulate > 100 ft. S with LRAD for home mobility. 3. Patient Education: Independent with HEP for home safety. Outcome: Progressing Towards Goal  PHYSICAL THERAPY EVALUATION     Patient: Sandra Michael (6 y.o. female)  Date: 4/4/2017  Primary Diagnosis: Iliopsoas muscle hematoma, right, initial encounter  Pyelonephritis  Psoas hematoma, right, secondary to anticoagulant therapy, sequela  Iliopsoas muscle hematoma  Precautions:   Fall      ASSESSMENT :  Based on the objective data described below, the patient presents to PT with decreased functional mobility with regard to bed mobility, transfers, gait, and overall tolerance for activity. Patient admitted s/p fall at home going to the bathroom, noted abdominal pain, (+) R iliopsoas hematoma. Patient received sitting up in chair at bedside, reports significant increase in pain in lower part of abdominal, groin and B anterior thigh region with movements. Patient required min A for standing with RW, vc's for maintaining upright position and safe static standing. Patient unable to initiate ambulation due to significant increase in pain, unable to weight-shift through B LE for advancement with RW. Patient returned to seated position in chair at beside. Patient would benefit from PT to address above impairments and assist with discharge planning. Patient will benefit from skilled intervention to address the above impairments.   Patients rehabilitation potential is considered to be Good  Factors which may influence rehabilitation potential include:   [ ]         None noted  [ ]         Mental ability/status  [ ]         Medical condition  [ ]         Home/family situation and support systems  [ ]         Safety awareness  [X]         Pain tolerance/management  [ ]         Other:        PLAN :  Recommendations and Planned Interventions:  [X]           Bed Mobility Training             [X]    Neuromuscular Re-Education  [X]           Transfer Training                   [ ]    Orthotic/Prosthetic Training  [X]           Gait Training                          [ ]    Modalities  [X]           Therapeutic Exercises          [ ]    Edema Management/Control  [X]           Therapeutic Activities            [X]    Patient and Family Training/Education  [ ]           Other (comment):     Frequency/Duration: Patient will be followed by physical therapy daily x 4-7 x week to address goals. Discharge Recommendations: Naveen Dixon  Further Equipment Recommendations for Discharge: rolling walker       SUBJECTIVE:   Patient stated It is just so painful.       OBJECTIVE DATA SUMMARY:       Past Medical History:   Diagnosis Date    Acute venous embolism and thrombosis of deep veins of upper extremity ([facility] 9/5/2012    Anemia in chronic kidney disease(285.21) 9/5/2012    Anticoagulated on Coumadin 9/5/2012    Biventricular implantable cardiac defibrillator in situ 4/28/05     upgrade to BiV AICD, gen change 4/08, pocket revision 10/09    Cardiac cath 08/15/1996     Patent coronaries. Elev LVEDP. EF 50-55%.  Cardiac echocardiogram 06/23/2015     Ltd study. EF 45-50%. Mild, diffuse hypk. Severe apical hypk. No mass or thrombus was clearly identified, although imaging was suboptimal.      Cardiac nuclear imaging test 06/19/2015     Fixed distal apical, distal septal defect more likely due to RV pacing than prior infarct. No ischemia. EF 46%. RWMA c/w RV pacing.   Nondiagnostic EKG on pharm stress test.  Cardiovascular lower extremity venous duplex 09/04/2012     Acute, non-occlusive DVT in CFV on right. No DVT on left. No superficial thrombosis bilaterally.  Cardiovascular upper extremity venous duplex 08/27/2012     DVT in axillary vein on left. Left subclavian was not visualized.     CH[facility]congest[facility] heart failure) ([facility])      Chronic kidney disease, stage IV (severe) ([facility]) 9/5/2012    Congestive heart failure, unspecified      Diabetes ([facility]      Diabetic peripheral neuropathy associated with type 2 diabetes mellitus ([facility] 6/28/2011    Difficult airway for intubation 08/22/2012     see anesthesia airway note    Generalized weakness 9/5/2012    Hypercholesterolemia      Hypertension      LBBB (left bundle branch block)      Nonischemic cardiomyopathy      Obesity (BMI 35.0-39.9 without comorbidity) ([facility] 3/13/2017    Pacemaker 12/04     status post DDD permanent pacemaker    Peripheral neuropathy ([facility]       secondary due to diabetes    Postoperative anemia due to acute blood loss 9/5/2012    Type II or unspecified type diabetes mellitus with neurological manifestations, not stated as uncontrolled 6/28/2011    Unspecified essential hypertension 9/5/2012     Past Surgical History:   Procedure Laterality Date    HX CARPAL TUNNEL RELEASE   4/07     right     HX CHOLECYSTECTOMY   1994    HX HYSTERECTOMY   1973    HX OTHER SURGICAL   6/11/2012     AICD revision    HX PACEMAKER   4/28/2005     [employer] AICD     Barriers to Learning/Limitations: None  Compensate with: N/A  Prior Level of Function/Home Situation:   Home Situation  Home Environment: Private residence  # Steps to Enter: 3  Rails to Enter: Yes  Hand Rails : Bilateral  One/Two Story Residence: One story  Living Alone: No  Support Systems: Spouse/Significant Other/Partner  Patient Expects to be Discharged to[de-identified] Unknown  Current DME Used/Available at Home: Walker, rolling  Tub or Shower Type: Shower  Critical Behavior:  Neurologic State: Alert; Appropriate for age  Psychosocial  Patient Behaviors: Calm; Cooperative  Purposeful Interaction: Yes  Pt Identified Daily Priority: Clinical issues (comment)  Caritas Process: Establish trust  Caring Interventions: Therapeutic modalities; Reassure  Reassure: Acceptance; Informing  Therapeutic Modalities: Intentional therapeutic touch  Strength:    Strength: Generally decreased, functional (B LE 3/5)  Tone & Sensation:   Tone: Normal (B LE)  Sensation: Intact (B LE intact to LT)   Range Of Motion:  AROM: Generally decreased, functional (B LE)  Functional Mobility:  Bed Mobility:  Rolling:  (N/A-pt up in chair at bedside upon arrival)  Supine to Sit:  (Patient seated on EOB upon arrival.)  Scooting: Contact guard assistance; Additional time  Transfers:  Sit to Stand: Minimum assistance; Additional time (with RW)  Stand to Sit: Minimum assistance; Additional time (with RW)  Balance:   Sitting: Intact  Standing: Impaired;Pull to stand; With support (with RW)  Standing - Static: Fair;Constant support (with RW)  Standing - Dynamic :  (N/A)  Ambulation/Gait Training:  Ambulation - Level of Assistance:  (N/A-pt unable due to increased pain in B LE)  Pain:  Pain Scale 1: Numeric (0 - 10)  Pain Intensity 1: 8  Pain Location 1: Abdomen;Groin  Pain Orientation 1: Lower  Pain Description 1: Aching; Sharp  Pain Intervention(s) 1: Rest  Activity Tolerance:   Fair/good  Please refer to the flowsheet for vital signs taken during this treatment. After treatment:   [X] Patient left in no apparent distress sitting up in chair  [ ] Patient left sitting on EOB  [ ] Patient left in no apparent distress in bed  [ ] Patient declined to be OOB at this time due to   [X] Call bell left within reach  [X] Nursing notified(DANIEL Santiago)  [ ] Caregiver present  [ ] Bed alarm activated      COMMUNICATION/EDUCATION:   [X]         Fall prevention education was provided and the patient/caregiver indicated understanding.   [X]         Patient/family have participated as able in goal setting and plan of care. [X]         Patient/family agree to work toward stated goals and plan of care. [ ]         Patient understands intent and goals of therapy, but is neutral about his/her participation. [ ]         Patient is unable to participate in goal setting and plan of care. Thank you for this referral.  Loi Wood, PT   Time Calculation: 24 mins      G-codes:  Mobility  Current  CK= 40-59%   Goal  CI= 1-19%. The severity rating is based on the Level of Assistance required for Functional Mobility and ADLs.      Eval Complexity: History: HIGH Complexity :3+ comorbidities / personal factors will impact the outcome/ POC Exam:MEDIUM Complexity : 3 Standardized tests and measures addressing body structure, function, activity limitation and / or participation in recreation  Presentation: MEDIUM Complexity : Evolving with changing characteristics Overall Complexity:MEDIUM

## 2017-04-04 NOTE — ROUTINE PROCESS
Bedside shift change report given to [address] [place] [address] Avenue (oncoming nurse) by Edward Eller (offgoing nurse). Report included the following information SBAR.

## 2017-04-05 LAB
ANION GAP BLD CALC-SCNC: 10 MMOL/L (ref 3–18)
BACTERIA SPEC CULT: NORMAL
BACTERIA SPEC CULT: NORMAL
BASOPHILS # BLD AUTO: 0 K/UL (ref 0–0.06)
BASOPHILS # BLD: 0 % (ref 0–2)
BUN SERPL-MCNC: 28 MG/DL (ref 7–18)
BUN/CREAT SERPL: 20 (ref 12–20)
CALCIUM SERPL-MCNC: 9 MG/DL (ref 8.5–10.1)
CHLORIDE SERPL-SCNC: 96 MMOL/L (ref 100–108)
CO2 SERPL-SCNC: 27 MMOL/L (ref 21–32)
CREAT SERPL-MCNC: 1.4 MG/DL (ref 0.6–1.3)
DIFFERENTIAL METHOD BLD: ABNORMAL
EOSINOPHIL # BLD: 0.1 K/UL (ref 0–0.4)
EOSINOPHIL NFR BLD: 1 % (ref 0–5)
ERYTHROCYTE [DISTWIDTH] IN BLOOD BY AUTOMATED COUNT: 15 % (ref 11.6–14.5)
GLUCOSE BLD STRIP.AUTO-MCNC: 131 MG/DL (ref 70–110)
GLUCOSE BLD STRIP.AUTO-MCNC: 145 MG/DL (ref 70–110)
GLUCOSE BLD STRIP.AUTO-MCNC: 151 MG/DL (ref 70–110)
GLUCOSE BLD STRIP.AUTO-MCNC: 219 MG/DL (ref 70–110)
GLUCOSE SERPL-MCNC: 100 MG/DL (ref 74–99)
HCT VFR BLD AUTO: 31 % (ref 35–45)
HGB BLD-MCNC: 10.4 G/DL (ref 12–16)
INR PPP: 1.2 (ref 0.8–1.2)
LYMPHOCYTES # BLD AUTO: 9 % (ref 21–52)
LYMPHOCYTES # BLD: 1.1 K/UL (ref 0.9–3.6)
MCH RBC QN AUTO: 27 PG (ref 24–34)
MCHC RBC AUTO-ENTMCNC: 33.5 G/DL (ref 31–37)
MCV RBC AUTO: 80.5 FL (ref 74–97)
MONOCYTES # BLD: 0.8 K/UL (ref 0.05–1.2)
MONOCYTES NFR BLD AUTO: 7 % (ref 3–10)
NEUTS SEG # BLD: 9.7 K/UL (ref 1.8–8)
NEUTS SEG NFR BLD AUTO: 83 % (ref 40–73)
PLATELET # BLD AUTO: 325 K/UL (ref 135–420)
PMV BLD AUTO: 10 FL (ref 9.2–11.8)
POTASSIUM SERPL-SCNC: 4.2 MMOL/L (ref 3.5–5.5)
PROTHROMBIN TIME: 14.8 SEC (ref 11.5–15.2)
RBC # BLD AUTO: 3.85 M/UL (ref 4.2–5.3)
SERVICE CMNT-IMP: NORMAL
SERVICE CMNT-IMP: NORMAL
SODIUM SERPL-SCNC: 133 MMOL/L (ref 136–145)
WBC # BLD AUTO: 11.7 K/UL (ref 4.6–13.2)

## 2017-04-05 RX ORDER — SODIUM CHLORIDE 9 MG/ML
60 INJECTION, SOLUTION INTRAVENOUS CONTINUOUS
Status: DISCONTINUED | OUTPATIENT
Start: 2017-04-05 | End: 2017-04-05

## 2017-04-05 RX ADMIN — GABAPENTIN 300 MG: 300 CAPSULE ORAL at 18:55

## 2017-04-05 RX ADMIN — INSULIN LISPRO 2 UNITS: 100 INJECTION, SOLUTION INTRAVENOUS; SUBCUTANEOUS at 12:59

## 2017-04-05 RX ADMIN — OXYCODONE HYDROCHLORIDE AND ACETAMINOPHEN 1 TABLET: 5; 325 TABLET ORAL at 00:03

## 2017-04-05 RX ADMIN — DOCUSATE SODIUM 100 MG: 100 CAPSULE, LIQUID FILLED ORAL at 18:55

## 2017-04-05 RX ADMIN — INSULIN LISPRO 4 UNITS: 100 INJECTION, SOLUTION INTRAVENOUS; SUBCUTANEOUS at 09:13

## 2017-04-05 RX ADMIN — HYDROMORPHONE HYDROCHLORIDE 1 MG: 1 INJECTION, SOLUTION INTRAMUSCULAR; INTRAVENOUS; SUBCUTANEOUS at 03:39

## 2017-04-05 RX ADMIN — HYDROMORPHONE HYDROCHLORIDE 1 MG: 1 INJECTION, SOLUTION INTRAMUSCULAR; INTRAVENOUS; SUBCUTANEOUS at 12:59

## 2017-04-05 RX ADMIN — INSULIN GLARGINE 24 UNITS: 100 INJECTION, SOLUTION SUBCUTANEOUS at 21:08

## 2017-04-05 RX ADMIN — HYDROMORPHONE HYDROCHLORIDE 1 MG: 1 INJECTION, SOLUTION INTRAMUSCULAR; INTRAVENOUS; SUBCUTANEOUS at 16:40

## 2017-04-05 RX ADMIN — CARVEDILOL 12.5 MG: 12.5 TABLET, FILM COATED ORAL at 09:14

## 2017-04-05 RX ADMIN — OXYCODONE HYDROCHLORIDE AND ACETAMINOPHEN 1 TABLET: 5; 325 TABLET ORAL at 18:55

## 2017-04-05 RX ADMIN — DOCUSATE SODIUM 100 MG: 100 CAPSULE, LIQUID FILLED ORAL at 09:15

## 2017-04-05 RX ADMIN — GABAPENTIN 300 MG: 300 CAPSULE ORAL at 09:14

## 2017-04-05 RX ADMIN — OXYCODONE HYDROCHLORIDE AND ACETAMINOPHEN 1 TABLET: 5; 325 TABLET ORAL at 11:48

## 2017-04-05 RX ADMIN — FOLIC ACID 1 MG: 1 TABLET ORAL at 09:14

## 2017-04-05 RX ADMIN — INSULIN GLARGINE 24 UNITS: 100 INJECTION, SOLUTION SUBCUTANEOUS at 09:14

## 2017-04-05 RX ADMIN — ALLOPURINOL 100 MG: 100 TABLET ORAL at 09:15

## 2017-04-05 RX ADMIN — HYDROMORPHONE HYDROCHLORIDE 1 MG: 1 INJECTION, SOLUTION INTRAMUSCULAR; INTRAVENOUS; SUBCUTANEOUS at 21:08

## 2017-04-05 RX ADMIN — OXYCODONE HYDROCHLORIDE AND ACETAMINOPHEN 1 TABLET: 5; 325 TABLET ORAL at 06:05

## 2017-04-05 RX ADMIN — ALLOPURINOL 100 MG: 100 TABLET ORAL at 18:55

## 2017-04-05 RX ADMIN — CARVEDILOL 12.5 MG: 12.5 TABLET, FILM COATED ORAL at 18:55

## 2017-04-05 NOTE — PROGRESS NOTES
Problem: Self Care Deficits Care Plan (Adult)  Goal: *Acute Goals and Plan of Care (Insert Text)  Occupational Therapy Goals  Initiated 4/4/2017 within 7 day(s). 1. Patient will perform lower body dressing with supervision/set-up, AE prn.   2. Patient will perform toilet transfers with supervision/set-up. 3. Patient will perform all aspects of toileting with supervision/set-up. 4. Patient will participate in upper extremity therapeutic exercise/activities with supervision/set-up for 8 minutes to increase strength/endurance for ADLs. Outcome: Progressing Towards Goal  OCCUPATIONAL THERAPY TREATMENT     Patient: Solo Arora (48 y.o. female)  Date: 4/5/2017  Diagnosis: Iliopsoas muscle hematoma, right, initial encounter  Pyelonephritis  Psoas hematoma, right, secondary to anticoagulant therapy, sequela  Iliopsoas muscle hematoma Iliopsoas muscle hematoma       Precautions: Fall  Chart, occupational therapy assessment, plan of care, and goals were reviewed. ASSESSMENT:  Pt reports she got up earlier and refusing OOB activity at this time, reporting feeling comfortable and pain free at the moment. Pt's daughter is present in room. Pt is educated on the importance of EOB/OOB activities to increase strength and improve overall activity tolerance for carryover w/ADLs. Pt is agreeable for bed level TherEx. Patient performed BUE therapeutic exercises in all planes x10 reps each without resistance while supine in bed. No complaints of pain and no difficulty noted with completing exercises. Pt is educated on scooting technique and maneuvering in bed in preparation for ADLs. Pt was able to demonstrate back w/SBA.   Progression toward goals:  [ ]          Improving appropriately and progressing toward goals  [X]          Improving slowly and progressing toward goals  [ ]          Not making progress toward goals and plan of care will be adjusted       PLAN:  Patient continues to benefit from skilled intervention to address the above impairments. Continue treatment per established plan of care. Discharge Recommendations: To Be Determined  Further Equipment Recommendations for Discharge:  N/A      G-CODES:      Self Care  Current  CK= 40-59%. The severity rating is based on the Other Levels of Assistance for ADLs and functional mobility      SUBJECTIVE:   Patient stated I can get up tomorrow morning.       OBJECTIVE DATA SUMMARY:   Cognitive/Behavioral Status:  Neurologic State: Alert  Orientation Level: Oriented X4  Cognition: Appropriate decision making, Appropriate for age attention/concentration, Appropriate safety awareness, Follows commands  Safety/Judgement: Fall prevention, Awareness of environment  Functional Mobility and Transfers for ADLs:              Bed Mobility:  Rolling: Stand-by asssistance   Scooting: Stand-by asssistance (to [facility])               Therapeutic Exercises:   Patient performed BUE therapeutic exercises in all planes x10 reps each without resistance while supine in bed. No complaints of pain and no difficulty noted with completing exercises. Pain:  Pt reports 1/10 pain or discomfort prior to treatment. Pt reports 1/10 pain or discomfort post treatment. Activity Tolerance:    Fair  Please refer to the flowsheet for vital signs taken during this treatment.   After treatment:   [ ]  Patient left in no apparent distress sitting up in chair  [X]  Patient left in no apparent distress in bed  [X]  Call bell left within reach  [X]  Nursing notified  [ ]  Caregiver present  [ ]  Bed alarm activated     ANN Gusman  Time Calculation: 23 mins

## 2017-04-05 NOTE — PROGRESS NOTES
Problem: Mobility Impaired (Adult and Pediatric)  Goal: *Acute Goals and Plan of Care (Insert Text)  STGs to be addressed within 3 days:  1. Bed mobility: Supine to sit to supine S with HR for meals. 2. Activity tolerance: Tolerate up in chair 1-2 hrs for ADLs. 3. Transfers: Sit to stand to chair S with LRAD for ADLs. LTGs to be addressed within 7 days:  1. Standing/Ambulation Balance: Increase to Good with LRAD for safe transfers and gait. 2. Ambulation: Ambulate > 100 ft. S with LRAD for home mobility. 3. Patient Education: Independent with HEP for home safety. Outcome: Progressing Towards Goal  PHYSICAL THERAPY TREATMENT     Patient: Francheska Pierre (45 y.o. female)  Date: 4/5/2017  Diagnosis: Iliopsoas muscle hematoma, right, initial encounter  Pyelonephritis  Psoas hematoma, right, secondary to anticoagulant therapy, sequela  Iliopsoas muscle hematoma Iliopsoas muscle hematoma  Precautions: Fall   Chart, physical therapy assessment, plan of care and goals were reviewed. ASSESSMENT:  Patient found supine in bed willing to work with PT. Pt reports recently returning from b/s commode. Pt willing to perform supine therex and does so with additional time needed and decreased reps due to pain. Provided pt with ice packs to decrease pain and swelling. Pt's LEs elevated on pillows. Pt reports 7/10 pain throughout tx. Pt plans to d/c to rehab at this time. Will continue PT to address gait. Education: therex on own, icing. Progression toward goals:  [ ]      Improving appropriately and progressing toward goals  [X]      Improving slowly and progressing toward goals  [ ]      Not making progress toward goals and plan of care will be adjusted       PLAN:  Patient continues to benefit from skilled intervention to address the above impairments. Continue treatment per established plan of care.   Discharge Recommendations:  Rehab  Further Equipment Recommendations for Discharge:  rolling walker SUBJECTIVE:   Patient stated It hurts all the way around.       OBJECTIVE DATA SUMMARY:   Critical Behavior:  Neurologic State: Alert, Appropriate for age  Orientation Level: Oriented X4  Cognition: Appropriate for age attention/concentration  Safety/Judgement: Fall prevention, Awareness of environment  Functional Mobility Training:  Bed Mobility:  Rolling: Stand-by asssistance;Contact guard assistance  Therapeutic Exercises: Ankle pumps X 10 bilaterally, quad sets X 5 bilaterally, heel slides X 5 left X 2 right. Pain:  Pre tx pain: 7  Post tx pain: 7  Pain Scale 1: Numeric (0 - 10)  Pain Intensity 1: 7  Pain Location 1: Abdomen;Back  Pain Orientation 1: Lower  Activity Tolerance:   Fair  Please refer to the flowsheet for vital signs taken during this treatment.   After treatment:   [ ] Patient left in no apparent distress sitting up in chair  [X] Patient left in no apparent distress in bed  [X] Call bell left within reach  [ ] Nursing notified  [ ] Caregiver present  [ ] Bed alarm activated      Khurram Hand PTA   Time Calculation: 25 mins

## 2017-04-05 NOTE — CDMP QUERY
Please clarify if this patient is being treated/managed for:    => Severe Protein Calorie Malnutrition  =>Other Explanation of clinical findings  =>Unable to Determine (no explanation of clinical findings)    The medical record reflects the following:    Risk:66 y.o.  female with h/o CKD 3, Poorly controlled DM 2 , CM, CHF came in with right sided abd pain. Clinical Indicators:Patient reported usual body weight is 240 lbs. Stated felt that she had lost weight PTA; clothes fit looser. Noted weight loss of 8 lbs (5.5%) in past 2-4 weeks PTA per chart history,poor appetite         Treatment: Glucerna TID    Please clarify and document your clinical opinion in the progress notes and discharge summary including the definitive and/or presumptive diagnosis, (suspected or probable), related to the above clinical findings. Please include clinical findings supporting your diagnosis. If you DECLINE this query or would like to communicate with [facility], please utilize the \"[facility] message box\" at the TOP of the Progress Note on the right.       Thank you,[facility]

## 2017-04-05 NOTE — PROGRESS NOTES
Problem: Mobility Impaired (Adult and Pediatric)  Goal: *Acute Goals and Plan of Care (Insert Text)  STGs to be addressed within 3 days:  1. Bed mobility: Supine to sit to supine S with HR for meals. 2. Activity tolerance: Tolerate up in chair 1-2 hrs for ADLs. 3. Transfers: Sit to stand to chair S with LRAD for ADLs. LTGs to be addressed within 7 days:  1. Standing/Ambulation Balance: Increase to Good with LRAD for safe transfers and gait. 2. Ambulation: Ambulate > 100 ft. S with LRAD for home mobility. 3. Patient Education: Independent with HEP for home safety. 1031 - Pt currently in 9/10 pain, agreeable to PT, but will allow pt to receive pain medication before ambulating. Will f/u later in day.

## 2017-04-05 NOTE — ROUTINE PROCESS
Bedside and Verbal shift change report given to [address] (oncoming nurse) by Shaq Huffman RN (offgoing nurse). Report given with SBAR, Kardex, Intake/Output, MAR, Accordion and Recent Results.      Patient pulled out her perez and iv site while bathing-- left peerz out because Dr. Del Diaz note stated something about bladder trails  Am nurse aware of timing for need to urinate  And possible post void residual

## 2017-04-06 ENCOUNTER — HOSPITAL ENCOUNTER (INPATIENT)
Age: 67
LOS: 14 days | Discharge: ANOTHER HOSPITAL | DRG: 853 | End: 2017-04-20
Attending: INTERNAL MEDICINE | Admitting: HOSPITALIST
Payer: COMMERCIAL

## 2017-04-06 VITALS
DIASTOLIC BLOOD PRESSURE: 61 MMHG | WEIGHT: 227.1 LBS | OXYGEN SATURATION: 99 % | TEMPERATURE: 98.4 F | BODY MASS INDEX: 35.57 KG/M2 | HEART RATE: 63 BPM | RESPIRATION RATE: 18 BRPM | SYSTOLIC BLOOD PRESSURE: 92 MMHG

## 2017-04-06 DIAGNOSIS — I50.20 BENIGN HYPERTENSIVE HEART DISEASE WITH SYSTOLIC CHF, NYHA CLASS 2 (HCC): Chronic | ICD-10-CM

## 2017-04-06 DIAGNOSIS — S30.1XXD: ICD-10-CM

## 2017-04-06 DIAGNOSIS — Z79.4 TYPE 2 DIABETES MELLITUS WITH DIABETIC NEUROPATHY, WITH LONG-TERM CURRENT USE OF INSULIN (HCC): Chronic | ICD-10-CM

## 2017-04-06 DIAGNOSIS — M1A.9XX0 CHRONIC GOUT, UNSPECIFIED CAUSE, UNSPECIFIED SITE: Chronic | ICD-10-CM

## 2017-04-06 DIAGNOSIS — I11.0 BENIGN HYPERTENSIVE HEART DISEASE WITH SYSTOLIC CHF, NYHA CLASS 2 (HCC): Chronic | ICD-10-CM

## 2017-04-06 DIAGNOSIS — S70.11XD ILIOPSOAS MUSCLE HEMATOMA, RIGHT, SUBSEQUENT ENCOUNTER: Primary | ICD-10-CM

## 2017-04-06 DIAGNOSIS — E11.40 TYPE 2 DIABETES MELLITUS WITH DIABETIC NEUROPATHY, WITH LONG-TERM CURRENT USE OF INSULIN (HCC): Chronic | ICD-10-CM

## 2017-04-06 PROBLEM — R53.1 GENERALIZED WEAKNESS: Status: ACTIVE | Noted: 2017-03-30

## 2017-04-06 PROBLEM — Z78.9 IMPAIRED MOBILITY AND ADLS: Status: ACTIVE | Noted: 2017-03-30

## 2017-04-06 PROBLEM — R94.4 DECREASED CALCULATED GLOMERULAR FILTRATION RATE (GFR): Status: ACTIVE | Noted: 2017-03-30

## 2017-04-06 PROBLEM — Z87.448 HISTORY OF PYELONEPHRITIS: Status: ACTIVE | Noted: 2017-03-30

## 2017-04-06 PROBLEM — Z74.09 IMPAIRED MOBILITY AND ADLS: Status: ACTIVE | Noted: 2017-03-30

## 2017-04-06 PROBLEM — N10 ACUTE PYELONEPHRITIS: Status: ACTIVE | Noted: 2017-03-30

## 2017-04-06 PROBLEM — R33.9 URINARY RETENTION: Status: ACTIVE | Noted: 2017-03-30

## 2017-04-06 PROBLEM — R79.1 SUPRATHERAPEUTIC INTERNATIONAL NORMALIZED RATIO (INR): Status: ACTIVE | Noted: 2017-03-30

## 2017-04-06 LAB
GLUCOSE BLD STRIP.AUTO-MCNC: 103 MG/DL (ref 70–110)
GLUCOSE BLD STRIP.AUTO-MCNC: 110 MG/DL (ref 70–110)
GLUCOSE BLD STRIP.AUTO-MCNC: 122 MG/DL (ref 70–110)
GLUCOSE BLD STRIP.AUTO-MCNC: 144 MG/DL (ref 70–110)
GLUCOSE BLD STRIP.AUTO-MCNC: 62 MG/DL (ref 70–110)
GLUCOSE BLD STRIP.AUTO-MCNC: 83 MG/DL (ref 70–110)
INR PPP: 1.3 (ref 0.8–1.2)
PROTHROMBIN TIME: 15.3 SEC (ref 11.5–15.2)

## 2017-04-06 RX ORDER — DOCUSATE SODIUM 100 MG/1
100 CAPSULE, LIQUID FILLED ORAL 2 TIMES DAILY
Qty: 60 CAP | Refills: 0 | Status: ON HOLD | OUTPATIENT
Start: 2017-04-06 | End: 2017-04-18 | Stop reason: CLARIF

## 2017-04-06 RX ORDER — OXYCODONE AND ACETAMINOPHEN 7.5; 325 MG/1; MG/1
1 TABLET ORAL
Qty: 18 TAB | Refills: 0 | Status: ON HOLD | OUTPATIENT
Start: 2017-04-06 | End: 2017-04-18 | Stop reason: CLARIF

## 2017-04-06 RX ORDER — FACIAL-BODY WIPES
10 EACH TOPICAL
Qty: 10 SUPPOSITORY | Refills: 0 | Status: ON HOLD
Start: 2017-04-06 | End: 2017-04-18 | Stop reason: CLARIF

## 2017-04-06 RX ORDER — BISACODYL 5 MG
10 TABLET, DELAYED RELEASE (ENTERIC COATED) ORAL
Status: DISCONTINUED | OUTPATIENT
Start: 2017-04-06 | End: 2017-04-20 | Stop reason: HOSPADM

## 2017-04-06 RX ORDER — MAGNESIUM SULFATE 100 %
4 CRYSTALS MISCELLANEOUS AS NEEDED
Status: DISCONTINUED | OUTPATIENT
Start: 2017-04-06 | End: 2017-04-20 | Stop reason: HOSPADM

## 2017-04-06 RX ORDER — POLYETHYLENE GLYCOL 3350 17 G/17G
17 POWDER, FOR SOLUTION ORAL DAILY
Qty: 30 PACKET | Refills: 0 | Status: ON HOLD
Start: 2017-04-06 | End: 2017-04-18 | Stop reason: CLARIF

## 2017-04-06 RX ORDER — DEXTROSE 50 % IN WATER (D50W) INTRAVENOUS SYRINGE
25-50 AS NEEDED
Status: DISCONTINUED | OUTPATIENT
Start: 2017-04-06 | End: 2017-04-06 | Stop reason: SDUPTHER

## 2017-04-06 RX ORDER — ACETAMINOPHEN 325 MG/1
650 TABLET ORAL
Status: DISCONTINUED | OUTPATIENT
Start: 2017-04-06 | End: 2017-04-20 | Stop reason: HOSPADM

## 2017-04-06 RX ORDER — MAGNESIUM SULFATE 100 %
4 CRYSTALS MISCELLANEOUS AS NEEDED
Status: DISCONTINUED | OUTPATIENT
Start: 2017-04-06 | End: 2017-04-06 | Stop reason: SDUPTHER

## 2017-04-06 RX ORDER — FOLIC ACID 1 MG/1
1 TABLET ORAL DAILY
Qty: 30 TAB | Refills: 0 | Status: ON HOLD | OUTPATIENT
Start: 2017-04-06 | End: 2017-04-18 | Stop reason: CLARIF

## 2017-04-06 RX ORDER — OXYCODONE AND ACETAMINOPHEN 7.5; 325 MG/1; MG/1
1 TABLET ORAL
Status: DISCONTINUED | OUTPATIENT
Start: 2017-04-06 | End: 2017-04-06 | Stop reason: HOSPADM

## 2017-04-06 RX ORDER — ALLOPURINOL 100 MG/1
100 TABLET ORAL 2 TIMES DAILY
Status: DISCONTINUED | OUTPATIENT
Start: 2017-04-06 | End: 2017-04-07

## 2017-04-06 RX ORDER — POLYETHYLENE GLYCOL 3350 17 G/17G
17 POWDER, FOR SOLUTION ORAL DAILY
Status: DISCONTINUED | OUTPATIENT
Start: 2017-04-06 | End: 2017-04-07

## 2017-04-06 RX ORDER — UREA 10 %
2 LOTION (ML) TOPICAL 2 TIMES DAILY
Status: DISCONTINUED | OUTPATIENT
Start: 2017-04-07 | End: 2017-04-20 | Stop reason: HOSPADM

## 2017-04-06 RX ORDER — DOCUSATE SODIUM 100 MG/1
100 CAPSULE, LIQUID FILLED ORAL 2 TIMES DAILY
Status: DISCONTINUED | OUTPATIENT
Start: 2017-04-06 | End: 2017-04-07

## 2017-04-06 RX ORDER — GABAPENTIN 300 MG/1
300 CAPSULE ORAL 2 TIMES DAILY
Status: DISCONTINUED | OUTPATIENT
Start: 2017-04-06 | End: 2017-04-10

## 2017-04-06 RX ORDER — INSULIN GLARGINE 100 [IU]/ML
24 INJECTION, SOLUTION SUBCUTANEOUS 2 TIMES DAILY
Qty: 1 VIAL | Refills: 0 | Status: ON HOLD
Start: 2017-04-06 | End: 2017-04-18 | Stop reason: CLARIF

## 2017-04-06 RX ORDER — CARVEDILOL 12.5 MG/1
12.5 TABLET ORAL 2 TIMES DAILY
Qty: 30 TAB | Refills: 0 | Status: ON HOLD
Start: 2017-04-06 | End: 2017-04-18 | Stop reason: CLARIF

## 2017-04-06 RX ORDER — INSULIN GLARGINE 100 [IU]/ML
24 INJECTION, SOLUTION SUBCUTANEOUS 2 TIMES DAILY
Status: DISCONTINUED | OUTPATIENT
Start: 2017-04-06 | End: 2017-04-07

## 2017-04-06 RX ORDER — CARVEDILOL 12.5 MG/1
12.5 TABLET ORAL 2 TIMES DAILY WITH MEALS
Status: DISCONTINUED | OUTPATIENT
Start: 2017-04-07 | End: 2017-04-10

## 2017-04-06 RX ORDER — INSULIN LISPRO 100 [IU]/ML
INJECTION, SOLUTION INTRAVENOUS; SUBCUTANEOUS
Status: DISCONTINUED | OUTPATIENT
Start: 2017-04-07 | End: 2017-04-20

## 2017-04-06 RX ORDER — OXYCODONE AND ACETAMINOPHEN 7.5; 325 MG/1; MG/1
1 TABLET ORAL
Status: DISCONTINUED | OUTPATIENT
Start: 2017-04-06 | End: 2017-04-07

## 2017-04-06 RX ORDER — FOLIC ACID 1 MG/1
1 TABLET ORAL DAILY
Status: DISCONTINUED | OUTPATIENT
Start: 2017-04-07 | End: 2017-04-20 | Stop reason: HOSPADM

## 2017-04-06 RX ORDER — DEXTROSE 50 % IN WATER (D50W) INTRAVENOUS SYRINGE
25-50 AS NEEDED
Status: DISCONTINUED | OUTPATIENT
Start: 2017-04-06 | End: 2017-04-20 | Stop reason: HOSPADM

## 2017-04-06 RX ADMIN — FOLIC ACID 1 MG: 1 TABLET ORAL at 08:00

## 2017-04-06 RX ADMIN — GABAPENTIN 300 MG: 300 CAPSULE ORAL at 08:00

## 2017-04-06 RX ADMIN — DOCUSATE SODIUM 100 MG: 100 CAPSULE, LIQUID FILLED ORAL at 18:28

## 2017-04-06 RX ADMIN — OXYCODONE HYDROCHLORIDE AND ACETAMINOPHEN 1 TABLET: 7.5; 325 TABLET ORAL at 10:15

## 2017-04-06 RX ADMIN — DOCUSATE SODIUM 100 MG: 100 CAPSULE, LIQUID FILLED ORAL at 08:00

## 2017-04-06 RX ADMIN — INSULIN GLARGINE 24 UNITS: 100 INJECTION, SOLUTION SUBCUTANEOUS at 10:13

## 2017-04-06 RX ADMIN — ALLOPURINOL 100 MG: 100 TABLET ORAL at 08:00

## 2017-04-06 RX ADMIN — POLYETHYLENE GLYCOL 3350 17 G: 17 POWDER, FOR SOLUTION ORAL at 08:01

## 2017-04-06 RX ADMIN — HYDROMORPHONE HYDROCHLORIDE 1 MG: 1 INJECTION, SOLUTION INTRAMUSCULAR; INTRAVENOUS; SUBCUTANEOUS at 07:55

## 2017-04-06 RX ADMIN — CARVEDILOL 12.5 MG: 12.5 TABLET, FILM COATED ORAL at 08:00

## 2017-04-06 RX ADMIN — GABAPENTIN 300 MG: 300 CAPSULE ORAL at 18:28

## 2017-04-06 RX ADMIN — ALLOPURINOL 100 MG: 100 TABLET ORAL at 18:28

## 2017-04-06 RX ADMIN — OXYCODONE HYDROCHLORIDE AND ACETAMINOPHEN 1 TABLET: 7.5; 325 TABLET ORAL at 22:15

## 2017-04-06 RX ADMIN — INSULIN GLARGINE 24 UNITS: 100 INJECTION, SOLUTION SUBCUTANEOUS at 20:18

## 2017-04-06 RX ADMIN — OXYCODONE HYDROCHLORIDE AND ACETAMINOPHEN 1 TABLET: 5; 325 TABLET ORAL at 04:35

## 2017-04-06 RX ADMIN — OXYCODONE HYDROCHLORIDE AND ACETAMINOPHEN 1 TABLET: 7.5; 325 TABLET ORAL at 16:24

## 2017-04-06 NOTE — PROGRESS NOTES
New PT orders seen and acknowledged. Pt evaluated 4/4/17, and currently on PT caseload.  Thank you for your referral.    Kell Myrick, PTA

## 2017-04-06 NOTE — IP AVS SNAPSHOT
[address] [address] Patient: Fernando Arnold MRN: [id number] RFT:9/04/6259 You are allergic to the following Allergen Reactions Vancomycin Itching Ampicillin Itching Bactrim (Sulfamethoxazole-Trimethoprim) Unknown (comments) Blueberry Swelling Causes throat swelling Ciprofloxacin Itching Codeine Other (comments) Jumpy feeling Crestor (Rosuvastatin) Itching Darvocet A500 (Propoxyphene N-Acetaminophen) Itching Demerol (Meperidine) Itching Levaquin (Levofloxacin) Itching Lipitor (Atorvastatin) Myalgia Magnesium Oxide Itching  
 nausea Minocin (Minocycline) Unknown (comments) Pcn (Penicillins) Itching Pravachol (Pravastatin) Swelling Swelling in mouth Sulfa (Sulfonamide Antibiotics) Itching Ultracet (Tramadol-Acetaminophen) Itching Vicodin (Hydrocodone-Acetaminophen) Unknown (comments) Vytorin 10-10 (Ezetimibe-Simvastatin) Myalgia Percodan (Oxycodone Hcl-Oxycodone-Asa) Itching Recent Documentation Height Weight Breastfeeding? BMI OB Status Smoking Status 1.702 m 105.7 kg No 36.49 kg/m2 Hysterectomy Never Smoker Emergency Contacts Name Discharge Info Relation Home Work Mobile Tomi Gonzalez DISCHARGE CAREGIVER [3] Spouse [3] [phone] About your hospitalization You were admitted on:  April 6, 2017 You last received care in the:  [facility] UNIT You were discharged on:  April 19, 2017 Unit phone number:  [phone] Why you were hospitalized Your primary diagnosis was:  Psoas Hematoma, Right, Secondary To Anticoagulant Therapy  Your diagnoses also included:  History Of Pyelonephritis, Benign Hypertensive Heart Disease With Systolic Chf, Nyha Class 2 (Hcc), Decreased Calculated Glomerular Filtration Rate (Gfr), Generalized Weakness, Iliopsoas Muscle Hematoma, Impaired Mobility And Adls, Type 2 Diabetes Mellitus With Diabetic Neuropathy (Hcc), History Of Coumadin Therapy Providers Seen During Your Hospitalizations Provider Role Specialty Primary office phone Sharmaine Arguello MD Attending Provider Internal Medicine [phone] Your Primary Care Physician (PCP) Primary Care Physician Office Phone Office Fax José Miguel Golden [phone] Follow-up Information Follow up With Details Comments Contact Info [address] [place]  
[phone] Shorty Lopez DO On 5/4/2018 1045am [address] [facility] [address] [address] 
[phone] Krysten Hall DO   [address] [facility] [address] [address] 
[phone] Opal Puentes  will contact you [address] [place] [address], [place] [address] [address] [place]  
[phone] Your Appointments Wednesday April 19, 2017  9:45 AM EDT Any with Lyubov Hart MD  
[facility] ([facility]) Earnest Arias [address]  [address]  
[phone] Wednesday May 17, 2017  9:20 AM EDT Follow Up with Lisy Dyer DO [facility] ([facility]) [facility] [address] [address] [address] [phone] Thursday May 18, 2017  9:00 AM EDT Nurse Visit with [address] [place] [address],[address]  
[facility] ([facility]) HR Metabolic Program ([facility]) [phone] Current Discharge Medication List  
  
START taking these medications Dose & Instructions Dispensing Information Comments Morning Noon Evening Bedtime  
 acetaminophen 325 mg tablet Commonly known as:  TYLENOL  
   
 Your last dose was: Your next dose is:    
   
   
 Dose:  650 mg Take 2 Tabs by mouth every four (4) hours as needed (for fever or pain level less than 5/10). Indications: Fever, Pain Quantity:  30 Tab Refills:  0  
     
   
   
   
  
 bumetanide 1 mg tablet Commonly known as:  Sarahy Dejesus Your last dose was: Your next dose is: TAKE 1 TABLET TWICE A DAY Quantity:  180 Tab Refills:  1  
     
   
   
   
  
 docusate sodium 100 mg capsule Commonly known as:  Elaine Marquez Your last dose was: Your next dose is:    
   
   
 Dose:  100 mg Take 1 Cap by mouth daily (after breakfast). Indications: Constipation Quantity:  15 Cap Refills:  0  
     
   
   
   
  
 gabapentin 400 mg capsule Commonly known as:  NEURONTIN Replaces:  gabapentin 300 mg tablet Your last dose was: Your next dose is:    
   
   
 Dose:  400 mg Take 1 Cap by mouth two (2) times a day. Indications: NEUROPATHIC PAIN Quantity:  30 Cap Refills:  0  
     
   
   
   
  
 oxyCODONE-acetaminophen 7.5-325 mg per tablet Commonly known as:  PERCOCET 7.5 Your last dose was: Your next dose is:    
   
   
 Dose:  1 Tab Take 1 Tab by mouth every four (4) hours as needed (for pain level greater than 4/10). Max Daily Amount: 6 Tabs. Indications: Pain Quantity:  50 Tab Refills:  0  
     
   
   
   
  
 senna-docusate 8.6-50 mg per tablet Commonly known as:  Miko Painting Your last dose was: Your next dose is:    
   
   
 Dose:  2 Tab Take 2 Tabs by mouth daily (after dinner). Indications: Constipation Quantity:  30 Tab Refills:  0 CONTINUE these medications which have CHANGED Dose & Instructions Dispensing Information Comments Morning Noon Evening Bedtime  
 allopurinol 100 mg tablet Commonly known as:  Reece Riedel What changed:  when to take this Your last dose was: Your next dose is:    
   
   
 Dose:  100 mg Take 1 Tab by mouth daily. Indications: GOUT  
 Quantity:  15 Tab Refills:  0  
     
   
   
   
  
 carvedilol 6.25 mg tablet Commonly known as:  Lisette Ready What changed:   
- medication strength 
- how much to take Your last dose was: Your next dose is:    
   
   
 Dose:  6.25 mg Take 1 Tab by mouth two (2) times daily (with meals). Indications: hypertension Quantity:  30 Tab Refills:  0  
     
   
   
   
  
 cholecalciferol 1,000 unit tablet Commonly known as:  VITAMIN D3 What changed:   
- medication strength 
- how much to take Your last dose was: Your next dose is:    
   
   
 Dose:  2000 Units Take 2 Tabs by mouth daily. Indications: PREVENTION OF VITAMIN D DEFICIENCY Quantity:  30 Tab Refills:  0  
     
   
   
   
  
 insulin glargine 100 unit/mL injection Commonly known as:  LANTUS What changed:   
- how much to take - when to take this Your last dose was: Your next dose is:    
   
   
 Dose:  15 Units 15 Units by SubCUTAneous route nightly. Indications: type 2 diabetes mellitus Quantity:  1 Vial  
Refills:  0 CONTINUE these medications which have NOT CHANGED Dose & Instructions Dispensing Information Comments Morning Noon Evening Bedtime  
 cyclobenzaprine 10 mg tablet Commonly known as:  FLEXERIL Your last dose was: Your next dose is:    
   
   
 Dose:  10 mg Take 10 mg by mouth as needed for Muscle Spasm(s). Indications: MUSCLE SPASM Refills:  0  
     
   
   
   
  
 ferrous sulfate 325 mg (65 mg iron) tablet Your last dose was: Your next dose is:    
   
   
 Dose:  325 mg Take 1 Tab by mouth two (2) times daily (with meals). Quantity:  30 Tab Refills:  0  
     
   
   
   
  
 insulin aspart 100 unit/mL injection Commonly known as:  Kj Okeefe Your last dose was: Your next dose is: INITIATE INSULIN CORRECTIVE PROTOCOL (HR): Normal Insulin Sensitivity  For Blood Sugar (mg/dL) of:    Less than 150 =   0 units          150 -199 =   2 units 200 -249 =   4 units 250 -299 =   6 units 300 -349 =   8 units 350 and above =   10 units If 2 glucose readings are above 200 mg/dL Quantity:  10 mL Refills:  6 KLOR-CON M20 20 mEq tablet Generic drug:  potassium chloride Your last dose was: Your next dose is:    
   
   
 Dose:  20 mEq Take 20 mEq by mouth two (2) times a day. Indications: HYPOKALEMIA PREVENTION Refills:  0  
     
   
   
   
  
 pravastatin 40 mg tablet Commonly known as:  PRAVACHOL Your last dose was: Your next dose is:    
   
   
 Dose:  40 mg Take 40 mg by mouth nightly. Indications: DYSLIPIDEMIA Refills:  0 SITagliptin 50 mg tablet Commonly known as:  Peter Fruit Your last dose was: Your next dose is:    
   
   
 Dose:  50 mg Take 50 mg by mouth daily. Indications: type 2 diabetes mellitus Refills:  0 ZOFRAN ODT 4 mg disintegrating tablet Generic drug:  ondansetron Your last dose was: Your next dose is:    
   
   
 Dose:  4 mg Take 4 mg by mouth every eight (8) hours as needed for Nausea. Refills:  0 STOP taking these medications   
 gabapentin 300 mg tablet Commonly known as:  NEURONTIN Replaced by:  gabapentin 400 mg capsule Where to Get Your Medications These medications were sent to [address], [address] [address] Phone:  [phone]  
  bumetanide 1 mg tablet These medications were sent to [id number] [facility], [address] [facility]  [address] [place] [address] Phone:  [phone] acetaminophen 325 mg tablet  
 allopurinol 100 mg tablet  
 carvedilol 6.25 mg tablet  
 cholecalciferol 1,000 unit tablet  
 docusate sodium 100 mg capsule  
 gabapentin 400 mg capsule  
 insulin glargine 100 unit/mL injection  
 senna-docusate 8.6-50 mg per tablet Information on where to get these meds will be given to you by the nurse or doctor. ! Ask your nurse or doctor about these medications  
  oxyCODONE-acetaminophen 7.5-325 mg per tablet Discharge Instructions DISCHARGE SUMMARY from Nurse The following personal items are in your possession at time of discharge: 
 
Dental Appliances: None Visual Aid: None Home Medications: None Jewelry: Bailee Can Clothing: Footwear, Pants, Shirt Other Valuables: Cell Phone Personal Items Sent to Safe: none PATIENT INSTRUCTIONS: 
 
 
F-face looks uneven A-arms unable to move or move unevenly S-speech slurred or non-existent T-time-call 911 as soon as signs and symptoms begin-DO NOT go Back to bed or wait to see if you get better-TIME IS BRAIN. Warning Signs of HEART ATTACK Call 911 if you have these symptoms: 
? Chest discomfort. Most heart attacks involve discomfort in the center of the chest that lasts more than a few minutes, or that goes away and comes back. It can feel like uncomfortable pressure, squeezing, fullness, or pain. ? Discomfort in other areas of the upper body. Symptoms can include pain or discomfort in one or both arms, the back, neck, jaw, or stomach. ? Shortness of breath with or without chest discomfort. ? Other signs may include breaking out in a cold sweat, nausea, or lightheadedness. Don't wait more than five minutes to call [address]! Fast action can save your life. Calling 911 is almost always the fastest way to get lifesaving treatment. Emergency Medical Services staff can begin treatment when they arrive  up to an hour sooner than if someone gets to the hospital by car. The discharge information has been reviewed with the patient. The patient verbalized understanding. Discharge medications reviewed with the patient and appropriate educational materials and side effects teaching were provided. Patient armband removed and shredded MyChart Activation Thank you for requesting access to [facility]. Please follow the instructions below to securely access and download your online medical record. [facility] allows you to send messages to your doctor, view your test results, renew your prescriptions, schedule appointments, and more. How Do I Sign Up? 1. In your internet browser, go to [email or web address] 
2. Click on the First Time User? Click Here link in the Sign In box. You will be redirect to the New Member Sign Up page. 3. Enter your [facility] Access Code exactly as it appears below. You will not need to use this code after youve completed the sign-up process. If you do not sign up before the expiration date, you must request a new code. [facility] Access Code: [email or web address] Expires: 2017  6:05 PM (This is the date your [facility] access code will ) 4. Enter the last four digits of your Social Security Number (xxxx) and Date of Birth (mm/dd/yyyy) as indicated and click Submit. You will be taken to the next sign-up page. 5. Create a [facility] ID. This will be your [facility] login ID and cannot be changed, so think of one that is secure and easy to remember. 6. Create a [facility] password. You can change your password at any time. 7. Enter your Password Reset Question and Answer. This can be used at a later time if you forget your password. 8. Enter your e-mail address. You will receive e-mail notification when new information is available in [address] 9. Click Sign Up. You can now view and download portions of your medical record. 10. Click the Download Summary menu link to download a portable copy of your medical information. Additional Information If you have questions, please visit the Frequently Asked Questions section of the [employer] website at [email or web address] Remember, [employer] is NOT to be used for urgent needs. For medical emergencies, dial 911. 
 
 
 
 
 
 
 
 
------------------------------------------------------------------------------------------------------------ 
 
DISCHARGE INSTRUCTIONS 1. Make sure that when you request refills at the pharmacy that the refill requests are sent to your PCP (NOT to the prescriber at the [facility]) to avoid any delays in getting your medication refills. The physician at the [facility] [address] will not able to order medications or refills after discharge -- Please understand that though we would like to help, it is simply not safe for our physician to order you a medication that we cannot monitor. 2. If any of the prescribed medications require a prior authorization, contact your Primary Care Physician or specialist to EITHER complete prior authorizations and paperwork on your behalf OR prescribe an alternative medication. -- Please understand that though we would like to help, it is simply not safe for our physician to order you a medication that we cannot monitor. 3. Over-the-counter (OTC) medications will NOT be prescribed. You need to buy these medications over-the-counter. ------------------------------------------------------------------------------------------------------------------- Discharge Orders None Introducing [facility]! Bessy Pate introduces [employer] patient portal. Now you can access parts of your medical record, email your doctor's office, and request medication refills online. 1. In your internet browser, go to [email or web address] 2. Click on the First Time User? Click Here link in the Sign In box. You will see the New Member Sign Up page. 3. Enter your [employer] Access Code exactly as it appears below. You will not need to use this code after youve completed the sign-up process. If you do not sign up before the expiration date, you must request a new code. · [employer] Access Code: [email or web address] Expires: 5/1/2017  6:05 PM 
 
4. Enter the last four digits of your Social Security Number (xxxx) and Date of Birth (mm/dd/yyyy) as indicated and click Submit. You will be taken to the next sign-up page. 5. Create a [employer] ID. This will be your [employer] login ID and cannot be changed, so think of one that is secure and easy to remember. 6. Create a [employer] password. You can change your password at any time. 7. Enter your Password Reset Question and Answer. This can be used at a later time if you forget your password. 8. Enter your e-mail address. You will receive e-mail notification when new information is available in [address] 9. Click Sign Up. You can now view and download portions of your medical record. 10. Click the Download Summary menu link to download a portable copy of your medical information. If you have questions, please visit the Frequently Asked Questions section of the [employer] website. Remember, [employer] is NOT to be used for urgent needs. For medical emergencies, dial 911. Now available from your iPhone and Android! General Information Please provide this summary of care documentation to your next provider. Patient Signature:  ____________________________________________________________ Date:  ____________________________________________________________  
  
[facility] Provider Signature:  ____________________________________________________________ Date:  ____________________________________________________________

## 2017-04-06 NOTE — PROGRESS NOTES
New OT orders received. Patient was evaluated on 04/04/17 and on OT caseload. New OT order will be acknowledged.  Thank you for the referral.     Chrissy JUAREZ

## 2017-04-06 NOTE — PROGRESS NOTES
Hospitalist Progress Note    Patient: Taiwo Hopper MRN: [id number]  CSN: [id number]    YOB: 1950  Age: 77 y.o. Sex: female    DOA: 3/30/2017 LOS:  LOS: 6 days            Patient in NAD, awake, follows commands, c/o pain    Assessment/Plan     1. Leukocytosis poa  - likely reactive in the setting of Iliopsoas muscle hematoma. - improved  2. Chronic AC with coumadin- nowdiscontinued  3. Pyelonephritis (3/30/2017); at admission UA unremarkable. UC negative. Completed 5 days of Azactam.  4. Multiple antibiotic allergies. 5. Cardiomyopathy, nonischemic. , ef- 45%- NO CHF   6. Complete heart block S/p Biventricular implantable cardioverter-defibrillator; device replaced 4/12009 - PM check 3/31/17   7. DM2 with hyperglycemia, complicated by peripheral neuropathy. A1c 8.0. Consulted diabetes educator. - lantus, ssi. 8. Anemia of chronic dz - supplement folate. 9. Hyponatremia better. 10. ckd 3 at baseline, in the setting of atrophic kidney  11. Coumadin coagulopathy with INR 4.3 upon admission - resolved. 15. Fall at home - pt and ot. 13. Dyslipidemia - on Pravastatin. 14. right iliopsoas muscle hematoma - coumadin held. Per IR, no intervention, monitor for infection. Repeat CT done 4/2/17, no significant interval change. 15. Constipation - bowel regimen. 16. Obesity Body mass index is 34.86 kg/(m^2).   16. Urinary retention - perez came out this am, voiding without difficulty    PT recommends SNF  Dispo- Likely SNF tomorrow, d/w patient, d/w Care manager Fitz,     Additional Notes:      Case discussed with:  [x]Patient  []Family  [x]Nursing  [x]Case Management  DVT Prophylaxis:  []Lovenox  []Hep SQ  [x]SCDs  [x]Coumadin   []On Heparin gtt    Vital signs/Intake and Output:  Visit Vitals    /64 (BP 1 Location: Left arm, BP Patient Position: At rest)    Pulse 75    Temp 97.9 °F (36.6 °C)    Resp 18    Wt 101 kg (222 lb 9.6 oz)    SpO2 98%    Breastfeeding No    BMI 34.86 kg/m2 Current Shift:     Last three shifts:  04/04 0701 - 04/05 1900  In: 730 [P.O.:480]  Out: 1090 [Urine:1090]    General:  Awake, alert  Cardiovascular:  S1S2+, RRR  Pulmonary:  CTA b/l  GI:  Soft, BS+, NT, ND  Extremities:  No edema      Medications Reviewed      Labs: Results:       Chemistry Recent Labs      04/05/17   0331  04/04/17   1150  04/03/17   0517   GLU  100*  66*  112*   NA  133*  135*  138   K  4.2  3.9  4.5   CL  96*  99*  101   CO2  27  30  30   BUN  28*  27*  26*   CREA  1.40*  1.08  1.04   CA  9.0  8.5  9.4   AGAP  10  6  7   BUCR  20  25*  25*      CBC w/Diff Recent Labs      04/05/17   0331 04/04/17   0300  04/03/17   0517   WBC  11.7  9.2  9.2   RBC  3.85*  3.47*  3.62*   HGB  10.4*  9.3*  9.7*   HCT  31.0*  27.7*  29.0*   PLT  325  312  314   GRANS  83*  71  77*   LYMPH  9*  17*  14*   EOS  1  2  2      Cardiac Enzymes No results for input(s): CPK, CKND1, CARLI in the last 72 hours. No lab exists for component: CKRMB, TROIP   Coagulation Recent Labs      04/05/17 0331 04/04/17   0300   PTP  14.8  16.0*   INR  1.2  1.3*       Lipid Panel Lab Results   Component Value Date/Time    Cholesterol, total 154 07/24/2012 01:00 AM    HDL Cholesterol 48 07/24/2012 01:00 AM    LDL, calculated 90.4 07/24/2012 01:00 AM    VLDL, calculated 15.6 07/24/2012 01:00 AM    Triglyceride 78 07/24/2012 01:00 AM    CHOL/HDL Ratio 3.2 07/24/2012 01:00 AM      BNP No results for input(s): BNPP in the last 72 hours. Liver Enzymes No results for input(s): TP, ALB, TBIL, AP, SGOT, GPT in the last 72 hours. No lab exists for component: DBIL   Thyroid Studies Lab Results   Component Value Date/Time    TSH 1.5 04/19/2013 12:00 AM    TSH 1.92 07/24/2012 01:00 AM        Procedures/imaging: see electronic medical records for all procedures/Xrays and details which were not copied into this note but were reviewed prior to creation of Plan.     Edyta Hobbs MD

## 2017-04-06 NOTE — DISCHARGE INSTRUCTIONS
Patient armband removed and shredded  DISCHARGE SUMMARY from Nurse    The following personal items are in your possession at time of discharge:    Dental Appliances: None  Visual Aid: None     Home Medications: None  Jewelry: Earrings, Bracelet (1 pair earrings/bracelet alert)  Clothing: Undergarments, Pajamas, At bedside, Shirt  Other Valuables: None  Personal Items Sent to Safe: none          PATIENT INSTRUCTIONS:    After general anesthesia or intravenous sedation, for 24 hours or while taking prescription Narcotics:  · Limit your activities  · Do not drive and operate hazardous machinery  · Do not make important personal or business decisions  · Do  not drink alcoholic beverages  · If you have not urinated within 8 hours after discharge, please contact your surgeon on call. Report the following to your surgeon:  · Excessive pain, swelling, redness or odor of or around the surgical area  · Temperature over 100.5  · Nausea and vomiting lasting longer than 4 hours or if unable to take medications  · Any signs of decreased circulation or nerve impairment to extremity: change in color, persistent  numbness, tingling, coldness or increase pain  · Any questions        What to do at Home:  Recommended activity: Activity as tolerated,         *  Please give a list of your current medications to your Primary Care Provider. *  Please update this list whenever your medications are discontinued, doses are      changed, or new medications (including over-the-counter products) are added. *  Please carry medication information at all times in case of emergency situations. These are general instructions for a healthy lifestyle:    No smoking/ No tobacco products/ Avoid exposure to second hand smoke    Surgeon General's Warning:  Quitting smoking now greatly reduces serious risk to your health.     Obesity, smoking, and sedentary lifestyle greatly increases your risk for illness    A healthy diet, regular physical exercise & weight monitoring are important for maintaining a healthy lifestyle    You may be retaining fluid if you have a history of heart failure or if you experience any of the following symptoms:  Weight gain of 3 pounds or more overnight or 5 pounds in a week, increased swelling in our hands or feet or shortness of breath while lying flat in bed. Please call your doctor as soon as you notice any of these symptoms; do not wait until your next office visit. Recognize signs and symptoms of STROKE:    F-face looks uneven    A-arms unable to move or move unevenly    S-speech slurred or non-existent    T-time-call 911 as soon as signs and symptoms begin-DO NOT go       Back to bed or wait to see if you get better-TIME IS BRAIN. Warning Signs of HEART ATTACK     Call 911 if you have these symptoms:   Chest discomfort. Most heart attacks involve discomfort in the center of the chest that lasts more than a few minutes, or that goes away and comes back. It can feel like uncomfortable pressure, squeezing, fullness, or pain.  Discomfort in other areas of the upper body. Symptoms can include pain or discomfort in one or both arms, the back, neck, jaw, or stomach.  Shortness of breath with or without chest discomfort.  Other signs may include breaking out in a cold sweat, nausea, or lightheadedness. Don't wait more than five minutes to call 911 - MINUTES MATTER! Fast action can save your life. Calling 911 is almost always the fastest way to get lifesaving treatment. Emergency Medical Services staff can begin treatment when they arrive -- up to an hour sooner than if someone gets to the hospital by car. The discharge information has been reviewed with the patient. The patient verbalized understanding. Discharge medications reviewed with the patient and appropriate educational materials and side effects teaching were provided.

## 2017-04-06 NOTE — PROGRESS NOTES
Problem: Mobility Impaired (Adult and Pediatric)  Goal: *Acute Goals and Plan of Care (Insert Text)  STGs to be addressed within 3 days:  1. Bed mobility: Supine to sit to supine S with HR for meals. 2. Activity tolerance: Tolerate up in chair 1-2 hrs for ADLs. 3. Transfers: Sit to stand to chair S with LRAD for ADLs. LTGs to be addressed within 7 days:  1. Standing/Ambulation Balance: Increase to Good with LRAD for safe transfers and gait. 2. Ambulation: Ambulate > 100 ft. S with LRAD for home mobility. 3. Patient Education: Independent with HEP for home safety. PHYSICAL THERAPY TREATMENT     Patient: Amanda Garcia (63 y.o. female)  Date: 4/6/2017  Diagnosis: Iliopsoas muscle hematoma, right, initial encounter  Pyelonephritis  Psoas hematoma, right, secondary to anticoagulant therapy, sequela  Iliopsoas muscle hematoma Iliopsoas muscle hematoma       Precautions: Fall  Chart, physical therapy assessment, plan of care and goals were reviewed. ASSESSMENT:  Pt presents today alert and agreeable to co-tx of PT/OT. Pt is sitting up in locked recliner which she required MinAx1/CG of 2nd person from lower surface. Pt then used RW to amb 25ft in room with CG/Naida. Pt required VC's for sequencing and required continual cueing; pt transferred on/off high commode seat with CG and stood at sink to wash hands with fair to poor balance. Pt required safety training as she attempted to rest elbows on walker several times. Pt amb to locked recliner and sat with Naida in order to control descent. PT demonstrated again proper gait technique as pt demonstrates decreased step length on LLE due to RLE pain. Pt will continue to benefit from therapy for mobility. Pt was left resting in chair with call bell by her side to complete OT session.     Progression toward goals:  [X]      Improving appropriately and progressing toward goals  [ ]      Improving slowly and progressing toward goals  [ ]      Not making progress toward goals and plan of care will be adjusted       PLAN:  Patient continues to benefit from skilled intervention to address the above impairments. Continue treatment per established plan of care. Discharge Recommendations:  Inpatient Rehab  Further Equipment Recommendations for Discharge:  N/A       G-CODES:      Mobility  Current  CJ= 20-39%   Goal  CI= 1-19%. The severity rating is based on the Level of Assistance required for Functional Mobility and ADLs. SUBJECTIVE:   Patient stated I feel a little better but it's still really painful.       OBJECTIVE DATA SUMMARY:   Critical Behavior:  Neurologic State: Alert  Orientation Level: Oriented X4  Cognition: Appropriate for age attention/concentration, Appropriate decision making, Appropriate safety awareness, Follows commands  Safety/Judgement: Fall prevention, Awareness of environment  Functional Mobility Training:  Bed Mobility:   Scooting: Contact guard assistance   Transfers:  Sit to Stand: Minimum assistance;Assist x1 (SB of 2nd person)  Stand to Sit: Minimum assistance       x2 trials as pt transferred onto and off of commode with raised seat; pt required CG assist from higher surface   Balance:  Sitting: Intact  Standing: Impaired; With support  Standing - Static: Fair  Standing - Dynamic : Fair  Ambulation/Gait Training:  Distance (ft): 35 Feet (ft)  Assistive Device: Walker, rolling  Ambulation - Level of Assistance: Contact guard assistance;Minimal assistance   Gait Abnormalities: Antalgic;Decreased step clearance;Trunk sway increased   Base of Support: Widened   Speed/Lela: Pace decreased (<100 feet/min); Slow   Interventions: Safety awareness training; Tactile cues; Verbal cues; Visual/Demos   For sequencing of walker, safety training as pt leaning on walker with elbows at times   Pain:  Pt reports 5/10 pain or discomfort prior to treatment. Pt reports 7/10 pain or discomfort post treatment.          Activity Tolerance:   Pt demonstrated increased tolerance to activity as evidenced by ability to participate in gait training this session. Pt does continue to demonstrate decrease endurance and is limited by pain from further ambulation. Please refer to the flowsheet for vital signs taken during this treatment.   After treatment:   [X] Patient left in no apparent distress sitting up in chair  [ ] Patient left in no apparent distress in bed  [X] Call bell left within reach  [ ] Nursing notified  [ ] Caregiver present  [ ] Bed alarm activated      Abena Berry PT   Time Calculation: 23 mins

## 2017-04-06 NOTE — PROGRESS NOTES
NUTRITION    BPA/MST Referral       RECOMMENDATIONS / PLAN:     - Continue with current nutrition interventions   - Continue RD inpatient monitoring and evaluation. NUTRITION INTERVENTIONS & DIAGNOSIS:     [x] Meals/Snacks: modified diet   [x] Medical food supplementation: glucerna shake TID  [x] Vitamin/mineral supplementation: folic acid    Nutrition Diagnosis: Inadequate energy intake related to decreased appetite as evidenced by poor/variable meal intake, consuming 50% or less of recent meals. Patient meets the AND/ASPEN criteria for Acute Severe Protein Calorie Malnutrition as evidenced by:   Nutritional intake of <50% of recommended intake for >5 days   Weight loss of >1-2% in 1 week, >5% in 1 month, >7.5% in 3 months, or >10% in 6 months     ASSESSMENT:     Subjective/Objective:  Patient unavailable at time of visit. Has good appetite, improved meal intake. Average po intake adequate to meet patients estimated nutritional needs:   [] Yes     [x] No   [] Unable to determine at this time    Diet: DIET DIABETIC WITH OPTIONS Consistent Carb 1500-1600kcal; Regular; No Conc.  Sweets  DIET NUTRITIONAL SUPPLEMENTS All Meals; [facility]      Food Allergies: blueberries  Current Appetite:   [x] Good (per chart)     [] Fair     [] Poor     [] Other:  Appetite/meal intake prior to admission:   [] Good     [] Fair     [x] Poor x 1-2 weeks PTA    [] Other:  Feeding Limitations:  [] Swallowing difficulty    [] Chewing difficulty    [] Other:  Current Meal Intake:   Patient Vitals for the past 100 hrs:   % Diet Eaten   04/06/17 1735 75 %   04/06/17 1243 50 %   04/06/17 0845 50 %   04/05/17 1754 100 %   04/05/17 1351 100 %   04/03/17 0956 50 %     BM: 4/3  Skin Integrity: WDL   Edema: none   Pertinent Medications: Reviewed; colace, miralax, dulcolax      Recent Labs      04/05/17   0331  04/04/17   1150   NA  133*  135*   K  4.2  3.9   CL  96*  99*   CO2  27  30   GLU  100*  66*   BUN  28*  27*   CREA  1.40* 1.08   CA  9.0  8.5       Intake/Output Summary (Last 24 hours) at 04/06/17 1831  Last data filed at 04/06/17 1735   Gross per 24 hour   Intake              600 ml   Output              500 ml   Net              100 ml       Anthropometrics:  Ht Readings from Last 1 Encounters:   03/14/17 5' 7\" (1.702 m)     Last 3 Recorded Weights in this Encounter    03/31/17 2351 04/02/17 0500 04/06/17 0606   Weight: 105 kg (231 lb 8 oz) 101 kg (222 lb 9.6 oz) 103 kg (227 lb 1.6 oz)     Body mass index is 35.57 kg/(m^2). Weight History:  Patient reported usual body weight is 240 lbs. Stated felt that she had lost weight PTA; clothes fit looser. Noted weight loss of 8 lbs (5.5%) in past 2-4 weeks PTA per chart history    Weight Metrics 4/6/2017 3/30/2017 3/16/2017 3/14/2017 3/13/2017 2/9/2017 1/12/2017   Weight 227 lb 1.6 oz - 240 lb 243 lb 245 lb 245 lb 245 lb   BMI - 35.57 kg/m2 37.59 kg/m2 38.06 kg/m2 38.37 kg/m2 38.37 kg/m2 38.37 kg/m2        Admitting Diagnosis: Iliopsoas muscle hematoma, right, initial encounter  Pyelonephritis  Psoas hematoma, right, secondary to anticoagulant therapy, sequela  Iliopsoas muscle hematoma  Past Medical History:   Diagnosis Date    Anticoagulated on Coumadin 9/5/2012    Benign hypertensive heart disease with systolic CHF, NYHA class 2 ([facility] [address] 9/5/2012    Biventricular implantable cardiac defibrillator in situ 4/28/05    upgrade to BiV AICD, gen change 4/08, pocket revision 10/09    Biventricular implantable cardioverter-defibrillator in situ 6/28/2011    Abdominal - done on 8/22/2012 by Dr. Liu Other Cardiac cath 08/15/1996    Patent coronaries. Elev LVEDP. EF 50-55%.  Cardiac echocardiogram 06/23/2015    Ltd study. EF 45-50%. Mild, diffuse hypk. Severe apical hypk.   No mass or thrombus was clearly identified, although imaging was suboptimal.      Cardiac nuclear imaging test 06/19/2015    Fixed distal apical, distal septal defect more likely due to RV pacing than prior infarct. No ischemia. EF 46%. RWMA c/w RV pacing. Nondiagnostic EKG on pharm stress test.      Cardiovascular lower extremity venous duplex 09/04/2012    Acute, non-occlusive DVT in CFV on right. No DVT on left. No superficial thrombosis bilaterally.  Cardiovascular upper extremity venous duplex 08/27/2012    DVT in axillary vein on left. Left subclavian was not visualized.  Chronic anemia 9/5/2012    Chronic systolic heart failure (HCC)     Decreased calculated glomerular filtration rate (GFR) 3/30/2017    Calculated GFR equivalent to that of CKD stage 3 = 30-59 ml/min    Diabetic neuropathy associated with type 2 diabetes mellitus ([address] 6/28/2011    Difficult airway for intubation 08/22/2012    see anesthesia airway note    Dyslipidemia 6/28/2011    Generalized weakness 9/5/2012    History of complete heart block 6/28/2011    History of deep venous thrombosis 9/5/2012    Left upper extremity    Left bundle branch block (LBBB) on electrocardiogram 6/28/2011    Nonischemic cardiomyopathy ([address] 6/28/2011    Obesity (BMI 35.0-39.9 without comorbidity) ([address] 3/13/2017    Obstructive sleep apnea on CPAP 2/7/2012    Psoas hematoma, right, secondary to anticoagulant therapy 3/30/2017    Type 2 diabetes mellitus with diabetic neuropathy ([address] 6/28/2011       Education Needs:        [x] None identified  [] Identified - Not appropriate at this time  []  Identified and addressed - refer to education log  Learning Limitations:   [x] None identified  [] Identified    Cultural, Episcopal & ethnic food preferences:  [x] None identified    [] Identified and addressed     ESTIMATED NUTRITION NEEDS:     Calories: 2202-9339 kcal (MSJx1.2-1.3) based on  [x] Actual BW: 108 kg      [] IBW   CHO: 248-269 gm (50% kcal)   Protein:  gm (0.8-1 gm/kg) based on  [x] Actual BW      [] IBW   Fluid: 1 mL/kcal     MONITORING & EVALUATION:     Nutrition Goal(s):   1.  Po intake of meals will meet >75% of patient estimated nutritional needs within the next 7 days.   Outcome:  [x] Met/Ongoing    []  Not Met    [] New/Initial Goal     Monitoring:   [x] Diet tolerance   [x] Meal intake   [x] Supplement intake   [] GI symptoms/ability to tolerate po diet   [] Respiratory status   [] Plan of care      Previous Recommendations (for follow-up assessments only):     [x]   Implemented       []   Not Implemented (RD to address)     [] No Recommendation Made     Discharge Planning: diabetic, cardiac diet  [x] Participated in care planning, discharge planning, & interdisciplinary rounds as appropriate      Geneva Gerard, [address] [place] [address]   Pager: [phone]

## 2017-04-06 NOTE — PROGRESS NOTES
Problem: Self Care Deficits Care Plan (Adult)  Goal: *Acute Goals and Plan of Care (Insert Text)  Occupational Therapy Goals  Initiated 4/4/2017 within 7 day(s). 1. Patient will perform lower body dressing with supervision/set-up, AE prn.   2. Patient will perform toilet transfers with supervision/set-up. 3. Patient will perform all aspects of toileting with supervision/set-up. 4. Patient will participate in upper extremity therapeutic exercise/activities with supervision/set-up for 8 minutes to increase strength/endurance for ADLs. Outcome: Progressing Towards Goal  OCCUPATIONAL THERAPY TREATMENT     Patient: Nidia Mendez (4 y.o. female)  Date: 4/6/2017  Diagnosis: Iliopsoas muscle hematoma, right, initial encounter  Pyelonephritis  Psoas hematoma, right, secondary to anticoagulant therapy, sequela  Iliopsoas muscle hematoma Iliopsoas muscle hematoma       Precautions: Fall  Chart, occupational therapy assessment, plan of care, and goals were reviewed. ASSESSMENT:  Pt is motivated to participate in therapy this session. Pt participated in functional mobility to the bathroom and performed toilet txfr w/FWW and CGA. BSC placed over the toilet seat to raise toilet seat for safe txfr. Pt required VCs and tactile cues for hands placement and sequencing for safety during txfr. Pt performed toileting hygiene w/Supervised A seated on the BSC over toilet seat. Following toileting pt returned to the chair. Pt demonstrates good carryover of BUE TherEx from previous session, reports performing them 3xday, able to teach back the importance of TherEx to improve strength and overall activity tolerance to increase independence w/ADLs. Pt cont to be limited w/pain during functional mobility, but very motivated to regain independence w/ADLs.   Progression toward goals:  [X]          Improving appropriately and progressing toward goals  [ ]          Improving slowly and progressing toward goals  [ ]          Not making progress toward goals and plan of care will be adjusted       PLAN:  Patient continues to benefit from skilled intervention to address the above impairments. Continue treatment per established plan of care. Discharge Recommendations:  Rehab  Further Equipment Recommendations for Discharge:  n/a      G-CODES:      Self Care  Current  CK= 40-59%. The severity rating is based on the Level of Assistance required for Functional Mobility and ADLs. SUBJECTIVE:   Patient stated I am feeling better today I feel like I need to work hard.       OBJECTIVE DATA SUMMARY:   Cognitive/Behavioral Status:  Neurologic State: Alert  Orientation Level: Oriented X4  Cognition: Appropriate for age attention/concentration, Appropriate decision making, Appropriate safety awareness, Follows commands  Safety/Judgement: Fall prevention, Awareness of environment     Functional Mobility and Transfers for ADLs:                          Transfers:  Sit to Stand: Contact guard assistance;Minimum assistance              Toilet Transfer : Contact guard assistance               Balance:  Sitting: Intact  Standing: Impaired; With support  Standing - Static: Fair;Constant support  Standing - Dynamic : Fair (minus)     ADL Intervention:   Grooming  Grooming Assistance: Stand-by assistance (std sinkside)  Washing Hands: Stand-by assistance      Pain:  Pt doesn't rate pain or discomfort prior to treatment. Pt doesn't rate pain or discomfort post treatment. Activity Tolerance:    Fair+     Please refer to the flowsheet for vital signs taken during this treatment.   After treatment:   [X]  Patient left in no apparent distress sitting up in chair  [ ]  Patient left in no apparent distress in bed  [X]  Call bell left within reach  [X]  Nursing notified  [ ]  Caregiver present  [ ]  Bed alarm activated     Render [facility], CAGLE/  Time Calculation: 25 mins

## 2017-04-06 NOTE — IP AVS SNAPSHOT
Summary of Care Report The Summary of Care report has been created to help improve care coordination. Users with access to [employer] or [address] (Web-based application) may access additional patient information including the Discharge Summary. If you are not currently a [email or web address] user and need more information, please call the number listed below in the [place] [address] section and ask to be connected with Medical Records. Facility Information Name Address Phone [place] [address] [facility] [address] [phone] Patient Information Patient Name Sex  Praveen Casas ([id number]) Female 1950 Discharge Information Admitting Provider Service Area Unit Bo Bravo MD / [address] [place] [address] [facility] Unit / [phone] Discharge Provider Discharge Date/Time Discharge Disposition Destination (none) 2017 Morning (Pending) [facility] (none) Patient Language Language ENGLISH [13] Hospital Problems as of 2017  Reviewed: 2017  3:13 PM by Bo Bravo MD  
  
  
  
 Class Noted - Resolved Last Modified POA Active Problems Type 2 diabetes mellitus with diabetic neuropathy ([address] (Chronic)  2011 - Present 2017 by Bo Bravo MD Yes Entered by Solo Lamb Benign hypertensive heart disease with systolic CHF, NYHA class 2 ([address] (Chronic)  2012 - Present 2017 by Bo Bravo MD Yes Entered by Bo Bravo MD  
  Decreased calculated glomerular filtration rate (GFR)  3/30/2017 - Present 2017 by Bo Bravo MD Yes Entered by Bo Bravo MD  
  Overview Signed 2017  5:47 PM by Bo Bravo MD  
   Calculated GFR equivalent to that of CKD stage 3 = 30-59 ml/min   History of pyelonephritis  3/30/2017 - Present 2017 by Swati Quinonez Olga Mary MD Yes Entered by Vanessa Burroughs DO Iliopsoas muscle hematoma  3/30/2017 - Present 4/6/2017 by Chriss Hawthorne MD Yes Entered by Vanessa Burroughs DO  
  * (Principal)Psoas hematoma, right, secondary to anticoagulant therapy  3/30/2017 - Present 4/6/2017 by Chriss Hawthorne MD Yes Entered by Kalen Diamond MD  
  Generalized weakness  3/30/2017 - Present 4/6/2017 by Chriss Hawthorne MD Yes Entered by Chriss Hawthorne MD  
  Impaired mobility and ADLs  3/30/2017 - Present 4/6/2017 by Chriss Hawthorne MD Yes Entered by Chriss Hawthorne MD  
  History of Coumadin therapy  Unknown - Present 4/6/2017 by Chriss Hawthorne MD Yes Entered by Chriss Hawthorne MD  
  Overview Signed 4/6/2017 10:35 PM by Chriss Hawthorne MD  
   Anticoagulation for chronic atrial fibrillation; Discontinued on 3/30/2017 Non-Hospital Problems as of 4/19/2017  Reviewed: 4/18/2017  3:13 PM by Chriss Hawthorne MD  
  
  
  
 Class Noted - Resolved Last Modified Active Problems Nonischemic cardiomyopathy ([facility] (Chronic)  6/28/2011 - Present 4/6/2017 by Chriss Hawthorne MD  
  Entered by Justine Andrews History of complete heart block  6/28/2011 - Present 4/6/2017 by Chriss Hawthorne MD  
  Entered by Justine Andrews Biventricular implantable cardioverter-defibrillator in situ  4/28/2005 - Present 4/6/2017 by Chriss Hawthorne MD  
  Entered by Justine Andrews Overview Addendum 4/6/2017 10:39 PM by Chriss Hawthorne MD  
   Upgraded to BiV AICD; gen change 4/2008; pocket revision 10/2009; Abdominal - done on 8/22/2012 by Dr. Jerson Hopkins Left bundle branch block (LBBB) on electrocardiogram (Chronic)  6/28/2011 - Present 4/6/2017 by Chriss Hawthorne MD  
  Entered by Justine Andrews Dyslipidemia (Chronic)  6/28/2011 - Present 4/6/2017 by Chriss Hawthorne MD  
  Entered by Justine Andrews   Diabetic neuropathy associated with type 2 diabetes mellitus ([facility] (Chronic)  6/28/2011 - Present 4/6/2017 by Darvin Osler, MD  
  Entered by Lillian Henson Obstructive sleep apnea on CPAP (Chronic)  2/7/2012 - Present 4/6/2017 by Darvin Osler, MD  
  Entered by Mariann Anaya DO Overview Deleted 4/6/2017  5:45 PM by Darvin Osler, MD  
     
  
  AICD generator infection [facility])  8/20/2012 - Present 9/11/2012 Entered by Dennys Chavez MD  
  Overview Signed 8/20/2012  6:13 PM by Dennys Chavez MD  
   S/p explant 4 leads 8/20/12 Difficult airway for intubation  8/22/2012 - Present 8/23/2012 by Haylee Terry MD  
  Entered by Haylee Terry MD  
  Overview Signed 8/23/2012  9:53 AM by Haylee Terry MD  
   see anesthesia airway note Chronic anemia (Chronic)  9/5/2012 - Present 4/6/2017 by Darvin Osler, MD  
  Entered by Darvin Osler, MD  
  History of deep venous thrombosis  9/5/2012 - Present 4/6/2017 by Darvin Osler, MD  
  Entered by Darvin Osler, MD  
  Overview Addendum 4/6/2017  5:42 PM by Darvin Osler, MD  
   Left upper extremity Anticoagulated on Coumadin  9/5/2012 - Present 9/11/2012 Entered by Darvin Osler, MD  
  Pacemaker twiddler's syndrome  10/8/2012 - Present 10/11/2012 Entered by Dennys Chavez MD  
  Chronic systolic heart failure [facility]) (Chronic)  Unknown - Present 4/6/2017 by Darvin Osler, MD  
  Entered by Lucrecia Levels Obesity (BMI 35.0-39.9 without comorbidity) ([facility]  3/13/2017 - Present 3/13/2017 by Jb Flores Entered by Jb Flores  
  Gout (Chronic)  Unknown - Present 4/7/2017 by Darvin Osler, MD  
  Entered by Darvin Osler, MD  
  
You are allergic to the following Allergen Reactions Vancomycin Itching Ampicillin Itching Bactrim (Sulfamethoxazole-Trimethoprim) Unknown (comments) Blueberry Swelling Causes throat swelling Ciprofloxacin Itching Codeine Other (comments) Jumpy feeling Crestor (Rosuvastatin) Itching Darvocet A500 (Propoxyphene N-Acetaminophen) Itching Demerol (Meperidine) Itching Levaquin (Levofloxacin) Itching Lipitor (Atorvastatin) Myalgia Magnesium Oxide Itching  
 nausea Minocin (Minocycline) Unknown (comments) Pcn (Penicillins) Itching Pravachol (Pravastatin) Swelling Swelling in mouth Sulfa (Sulfonamide Antibiotics) Itching Ultracet (Tramadol-Acetaminophen) Itching Vicodin (Hydrocodone-Acetaminophen) Unknown (comments) Vytorin 10-10 (Ezetimibe-Simvastatin) Myalgia Percodan (Oxycodone Hcl-Oxycodone-Asa) Itching Current Discharge Medication List  
  
START taking these medications Dose & Instructions Dispensing Information Comments  
 acetaminophen 325 mg tablet Commonly known as:  TYLENOL Dose:  650 mg Take 2 Tabs by mouth every four (4) hours as needed (for fever or pain level less than 5/10). Indications: Fever, Pain Quantity:  30 Tab Refills:  0  
   
 bumetanide 1 mg tablet Commonly known as:  Margmike Arango TAKE 1 TABLET TWICE A DAY Quantity:  180 Tab Refills:  1  
   
 docusate sodium 100 mg capsule Commonly known as:  Zoielaureen Wyman Dose:  100 mg Take 1 Cap by mouth daily (after breakfast). Indications: Constipation Quantity:  15 Cap Refills:  0  
   
 gabapentin 400 mg capsule Commonly known as:  NEURONTIN Replaces:  gabapentin 300 mg tablet Dose:  400 mg Take 1 Cap by mouth two (2) times a day. Indications: NEUROPATHIC PAIN Quantity:  30 Cap Refills:  0  
   
 oxyCODONE-acetaminophen 7.5-325 mg per tablet Commonly known as:  PERCOCET 7.5 Dose:  1 Tab Take 1 Tab by mouth every four (4) hours as needed (for pain level greater than 4/10). Max Daily Amount: 6 Tabs. Indications: Pain Quantity:  50 Tab Refills:  0  
   
 senna-docusate 8.6-50 mg per tablet Commonly known as:  Joellen Holland Dose:  2 Tab Take 2 Tabs by mouth daily (after dinner). Indications: Constipation Quantity:  30 Tab Refills:  0 CONTINUE these medications which have CHANGED Dose & Instructions Dispensing Information Comments  
 allopurinol 100 mg tablet Commonly known as:  Roman Gregorio What changed:  when to take this Dose:  100 mg Take 1 Tab by mouth daily. Indications: GOUT  
 Quantity:  15 Tab Refills:  0  
   
 carvedilol 6.25 mg tablet Commonly known as:  Rupert Barber What changed:   
- medication strength 
- how much to take Dose:  6.25 mg Take 1 Tab by mouth two (2) times daily (with meals). Indications: hypertension Quantity:  30 Tab Refills:  0  
   
 cholecalciferol 1,000 unit tablet Commonly known as:  VITAMIN D3 What changed:   
- medication strength 
- how much to take Dose:  2000 Units Take 2 Tabs by mouth daily. Indications: PREVENTION OF VITAMIN D DEFICIENCY Quantity:  30 Tab Refills:  0  
   
 insulin glargine 100 unit/mL injection Commonly known as:  LANTUS What changed:   
- how much to take - when to take this Dose:  15 Units 15 Units by SubCUTAneous route nightly. Indications: type 2 diabetes mellitus Quantity:  1 Vial  
Refills:  0 CONTINUE these medications which have NOT CHANGED Dose & Instructions Dispensing Information Comments  
 cyclobenzaprine 10 mg tablet Commonly known as:  FLEXERIL Dose:  10 mg Take 10 mg by mouth as needed for Muscle Spasm(s). Indications: MUSCLE SPASM Refills:  0  
   
 ferrous sulfate 325 mg (65 mg iron) tablet Dose:  325 mg Take 1 Tab by mouth two (2) times daily (with meals). Quantity:  30 Tab Refills:  0  
   
 insulin aspart 100 unit/mL injection Commonly known as:  Keely Moser  INITIATE INSULIN CORRECTIVE PROTOCOL (HR): Normal Insulin Sensitivity  For Blood Sugar (mg/dL) of:    Less than 150 =   0 units          150 -199 =   2 units 200 -249 =   4 units 250 -299 =   6 units 300 -349 =   8 units 350 and above =   10 units If 2 glucose readings are above 200 mg/dL Quantity:  10 mL Refills:  6 KLOR-CON M20 20 mEq tablet Generic drug:  potassium chloride Dose:  20 mEq Take 20 mEq by mouth two (2) times a day. Indications: HYPOKALEMIA PREVENTION Refills:  0  
   
 pravastatin 40 mg tablet Commonly known as:  PRAVACHOL Dose:  40 mg Take 40 mg by mouth nightly. Indications: DYSLIPIDEMIA Refills:  0 SITagliptin 50 mg tablet Commonly known as:  Tricia Brayan Dose:  50 mg Take 50 mg by mouth daily. Indications: type 2 diabetes mellitus Refills:  0 ZOFRAN ODT 4 mg disintegrating tablet Generic drug:  ondansetron Dose:  4 mg Take 4 mg by mouth every eight (8) hours as needed for Nausea. Refills:  0 STOP taking these medications Comments  
 gabapentin 300 mg tablet Commonly known as:  NEURONTIN Replaced by:  gabapentin 400 mg capsule Follow-up Information Follow up With Details Comments Contact Radha Huff, [address] 
[phone] Rachel Fay DO On 5/4/2018 1045am [address] [place] [address] [address] [address] 
[phone] Krysten Hall DO   [address] [place] [address] [address] [address] 
[phone] Opal Puentes  will contact you [address] [place] [address], [address] 
[phone] Discharge Instructions DISCHARGE SUMMARY from Nurse The following personal items are in your possession at time of discharge: 
 
Dental Appliances: None Visual Aid: None Home Medications: None Jewelry: Diogenes Abler Clothing: Footwear, Pants, Shirt Other Valuables: Cell Phone Personal Items Sent to Safe: none PATIENT INSTRUCTIONS: 
 
 
F-face looks uneven A-arms unable to move or move unevenly S-speech slurred or non-existent T-time-call 911 as soon as signs and symptoms begin-DO NOT go Back to bed or wait to see if you get better-TIME IS BRAIN. Warning Signs of HEART ATTACK Call 911 if you have these symptoms: 
? Chest discomfort. Most heart attacks involve discomfort in the center of the chest that lasts more than a few minutes, or that goes away and comes back. It can feel like uncomfortable pressure, squeezing, fullness, or pain. ? Discomfort in other areas of the upper body. Symptoms can include pain or discomfort in one or both arms, the back, neck, jaw, or stomach. ? Shortness of breath with or without chest discomfort. ? Other signs may include breaking out in a cold sweat, nausea, or lightheadedness. Don't wait more than five minutes to call [address]! Fast action can save your life. Calling 911 is almost always the fastest way to get lifesaving treatment. Emergency Medical Services staff can begin treatment when they arrive  up to an hour sooner than if someone gets to the hospital by car. The discharge information has been reviewed with the patient. The patient verbalized understanding. Discharge medications reviewed with the patient and appropriate educational materials and side effects teaching were provided. Patient armband removed and shredded MyChart Activation Thank you for requesting access to [facility]. Please follow the instructions below to securely access and download your online medical record.  [facility] allows you to send messages to your doctor, view your test results, renew your prescriptions, schedule appointments, and more. How Do I Sign Up? 1. In your internet browser, go to [email or web address] 
2. Click on the First Time User? Click Here link in the Sign In box. You will be redirect to the New Member Sign Up page. 3. Enter your [facility] Access Code exactly as it appears below. You will not need to use this code after youve completed the sign-up process. If you do not sign up before the expiration date, you must request a new code. [facility] Access Code: [email or web address] Expires: 2017  6:05 PM (This is the date your [facility] access code will ) 4. Enter the last four digits of your Social Security Number (xxxx) and Date of Birth (mm/dd/yyyy) as indicated and click Submit. You will be taken to the next sign-up page. 5. Create a [facility] ID. This will be your [facility] login ID and cannot be changed, so think of one that is secure and easy to remember. 6. Create a [facility] password. You can change your password at any time. 7. Enter your Password Reset Question and Answer. This can be used at a later time if you forget your password. 8. Enter your e-mail address. You will receive e-mail notification when new information is available in [address] 9. Click Sign Up. You can now view and download portions of your medical record. 10. Click the Download Summary menu link to download a portable copy of your medical information. Additional Information If you have questions, please visit the Frequently Asked Questions section of the [employer] [facility] website at [email or web address] Remember, [facility] is NOT to be used for urgent needs. For medical emergencies, dial 911. 
 
 
 
 
 
 
 
 
------------------------------------------------------------------------------------------------------------ 
 
DISCHARGE INSTRUCTIONS 1.  Make sure that when you request refills at the pharmacy that the refill requests are sent to your PCP (NOT to the prescriber at the [facility]) to avoid any delays in getting your medication refills. The physician at the [facility] for [address] will not able to order medications or refills after discharge -- Please understand that though we would like to help, it is simply not safe for our physician to order you a medication that we cannot monitor. 2. If any of the prescribed medications require a prior authorization, contact your Primary Care Physician or specialist to EITHER complete prior authorizations and paperwork on your behalf OR prescribe an alternative medication. -- Please understand that though we would like to help, it is simply not safe for our physician to order you a medication that we cannot monitor. 3. Over-the-counter (OTC) medications will NOT be prescribed. You need to buy these medications over-the-counter. ------------------------------------------------------------------------------------------------------------------- Chart Review Routing History Recipient Method Report Sent By Jonas Penn MD  
Fax: [phone] Phone: [phone] Fax Provider Comm Report Charles Hernandez [[id number]] 1/28/2011 10:23 AM 01/17/2011 Agustín Severino MD  
Fax: [phone] Phone: [phone] Fax Provider Comm Report Phong Fay [[id number]] 8/3/2011  8:26 AM 08/02/2011 Angely Penn DO Phone: [phone] In [facility] CUSTOM LAB REPORT Eze Zendejas [[id number]] 2/17/2012  9:29 AM 02/07/2012 [facility] CUSTOM LAB REPORT Eze Zendejas [[id number]] 2/17/2012  9:29 AM 02/07/2012 Agustín Severino MD  
Fax: [phone] Phone: [phone] Fax Provider Comm Report [facility] [[id number]] 2/21/2012  9:45 AM 02/12/2012 Raghav Schwarz MD  
Fax: [phone] Phone: [phone] Fax Provider Comm Report [place] [[id number]] 2/21/2012  9:45 AM 02/12/2012 Laura Guevara MD  
Fax: [phone] Phone: [phone] Fax Provider Comm Report Renae Ninomago [[id number]] 2/21/2012  9:45 AM 02/12/2012 Diamond Toledo MD  
Fax: [phone] Phone: [phone] Fax Provider Comm Report Renae Ninomago [[id number]] 2/21/2012  9:45 AM 02/12/2012 Mere Bhatia MD  
Fax: [phone] Phone: [phone] Fax Provider Comm Report Renae Ninomago [[id number]] 2/21/2012  9:45 AM 02/12/2012 Evie Lane MD  
Fax: [phone] Phone: [phone] Fax IP Auto Routed Trans José, Transcription [EDITRANS] 7/24/2012  6:19 AM 07/24/2012 Gwen Terrazas MD  
[address] Phone: [phone] Mail IP Auto Routed Trans José, Transcription [EDITRANS] 7/24/2012  6:19 AM 07/24/2012 Monik Berry MD  
Phone: [phone] In Basket IP Auto Routed Trans José, Transcription [EDITRANS] 7/24/2012  6:19 AM 07/24/2012 Rosa Reyes MD  
[address], [address] [facility] Phone: [phone] Mail IP Auto Routed Trans José, Transcription [EDITRANS] 7/24/2012  6:19 AM 07/24/2012 Monik Berry MD  
Phone: [phone] In [employer] Routeonur Mar MD [[id number]] 7/24/2012  2:33 PM 07/24/2012 Rosa Reyes MD  
[address], [address] [facility] Phone: [phone] Mail IP Auto Routed [employer], MD [[id number]] 7/24/2012  2:33 PM 07/24/2012 Monik Berry MD  
Phone: [phone] In Basket IP Auto Routed Trans José, Transcription [EDITRANS] 7/25/2012  1:23 PM 07/25/2012 Rosa Reyes MD  
[address] [facility], [address] [facility] Phone: [phone] Mail IP Auto Routed Trans José, Transcription [EDITRANS] 7/25/2012  1:23 PM 07/25/2012 Pritesh Mejía MD  
Phone: [phone] Fax IP Auto Routed Trans José, Transcription [EDITRANS] 7/27/2012  9:46 AM 07/27/2012 Blas Hopper MD  
[address] [facility], [address] [facility] Phone: [phone] Mail IP Auto Routed Trans José, Transcription [EDITRANS] 7/27/2012  9:46 AM 07/27/2012 Oliver Ojeda RN Phone: [phone] In [facility] CUSTOM LAB REPORT [facility] [[id number]] 8/15/2012  3:31 PM   
   
  [facility] CUSTOM LAB REPORT [facility] [[id number]] 8/15/2012  3:31 PM 08/15/2012 [facility] CUSTOM LAB REPORT [facility] [[id number]] 8/15/2012  3:31 PM 08/15/2012 [facility] CUSTOM LAB REPORT [facility] [[id number]] 8/15/2012  3:31 PM 08/15/2012 Noah Tim MD  
Fax: [phone] Phone: [phone] Fax IP Auto Routed [employer] [place] [address] 8/23/2012 11:49 AM 08/23/2012 Eligio Matute MD  
Phone: [phone] In [place] Routed [employer] [place], [place] [[id number]] 8/23/2012 11:49 AM 08/23/2012 Dalton Hannon MD  
Phone: [phone] In Basket IP Auto Routed Trans José, Transcription [EDITRANS] 8/23/2012  1:01 PM 08/23/2012 Eligio Matute MD  
Phone: [phone] In Basket IP Auto Routed Trans José, Transcription [EDITRANS] 8/23/2012  1:01 PM 08/23/2012 Eligio Matute MD  
Phone: [phone] In [facility] Routed Felicia Snyder MD [[id number]] 8/29/2012 11:36 AM 08/29/2012 Emmanuel Gordon MD  
Phone: [phone] In [employer] Routed Felicia Snyder MD [[id number]] 8/29/2012 11:36 AM 08/29/2012 Eligio Matute MD  
Phone: [phone] In [employer] Routed FPTAMEKA Mar MD [[id number]] 9/5/2012 10:36 AM 09/05/2012 Eligio Matute MD  
Phone: [phone] In [employer] Routed OSMAN Mar MD [[id number]] 9/5/2012  5:33 PM 09/05/2012 Eligio Matute MD  
Phone: [phone] In Basket IP Auto Routed Trans José, Transcription [EDITRANS] 9/6/2012  4:07 PM 09/06/2012 Carlita Toledo MD  
Fax: [phone] Phone: [phone] Fax IP Auto Routed Trans José, Transcription [EDITRANS] 9/7/2012 10:34 AM 09/07/2012 George Billy MD  
Phone: [phone] Fax IP Auto Routed Trans José, Transcription [EDITRANS] 9/7/2012 10:34 AM 09/07/2012 Darvin Osler, MD  
Phone: [phone] In Basket IP Auto Routed Trans José, Transcription [EDITRANS] 9/7/2012 10:34 AM 09/07/2012 Dennys Chavez MD  
Phone: [phone] In Basket IP Auto Routed Trans José, Transcription [EDITRANS] 9/7/2012 10:34 AM 09/07/2012 Raul Gregory MD  
Phone: [phone] In Basket IP Auto Routed Trans José, Transcription [EDITRANS] 9/7/2012 10:34 AM 09/07/2012 Mariann Anaya DO Phone: [phone] In Basket IP Auto Routed Trans José, Transcription [EDITRANS] 9/7/2012 10:34 AM 09/07/2012 Kelly Carter MD  
Phone: [phone] In Basket IP Auto Routed Trans Kelly Carter MD [[id number]] 9/11/2012  7:09 PM 09/11/2012 George Billy MD  
Phone: [phone] Fax IP Auto Routed Trans Kelly Carter MD [[id number]] 9/11/2012  7:09 PM 09/11/2012 Dennys Chavez MD  
Phone: [phone] In Basket IP Auto Routed Trans Kelly Carter MD [[id number]] 9/11/2012  7:09 PM 09/11/2012 Misha Pratt MD  
Phone: [phone] In Basket IP Auto Routed Trans Kelly Carter MD [[id number]] 9/11/2012  7:09 PM 09/11/2012 Carlita Toledo MD  
Fax: [phone] Phone: [phone] Fax Provider Comm Report Edgar Cleaning [[id number]] 9/25/2012 11:43 AM 09/24/2012 Florence Phillips RN Phone: [phone] In [place] [address] CUSTOM LAB REPORT [facility] [[id number]] 10/5/2012  9:16 AM 09/20/2012 [address] CUSTOM LAB REPORT [facility] [[id number]] 10/5/2012  9:16 AM 09/20/2012 Carlita Toledo MD  
Fax: [phone] Phone: [phone] Fax Provider Comm Report Edgar Hermila [[id number]] 10/8/2012 12:33 PM 10/05/2012 Carlita Toledo MD  
Fax: [phone] Phone: [phone] Fax Provider Comm Report Devaughn Alyson [[id number]] 10/31/2012 10:18 AM 10/25/2012 José Miguel Dominguez MD  
Phone: [phone] In [employer] Routed Trans Jared Sullivan MD [[id number]] 11/1/2012  8:27 PM 11/01/2012 Jared Sullivan MD  
Phone: [phone] In [employer] Routed Trans Jared Sullivan MD [[id number]] 11/1/2012  8:27 PM 11/01/2012 Jeff Klein MD  
Fax: [phone] Phone: [phone] Fax Provider Comm Report Devaughn Shields [[id number]] 11/13/2012  9:37 AM 11/10/2012 Jeff Klein MD  
Fax: [phone] Phone: [phone] Fax Provider Comm Report Devaughn Shields [[id number]] 12/11/2012 10:24 AM 12/11/2012 Jeff Klein MD  
Fax: [phone] Phone: [phone] Fax Provider Comm Report Devaughn Shields [[id number]] 12/13/2012 12:33 PM 12/11/2012 Gni Phan LPN Phone: [phone] In [facility] [employer] [[id number]] 12/17/2012  2:33 PM 12/06/2012 Jeff Klein MD  
Fax: [phone] Phone: [phone] Fax IP Auto Routed Trans Carlos Deng MD [[id number]] 6/21/2015 11:47 AM 06/21/2015

## 2017-04-06 NOTE — PROGRESS NOTES
ARU/IPR REFERRAL CONTACT NOTE  [id number] [place] [address] for Physical Rehabilitation          RE: French Baumgarten     Thank you for the opportunity to review this patient's case for admission to [address] [place] [address] for Physical Rehabilitation. Based on our pre-admission screening patient meets criteria for admission to [facility]. Plan for admission today to room ___187___ at _8:30______. Will need d/c summary/med rec completed and report called to [phone] prior to patient's arrival.    Again, Thank you for this referral. Should you have any questions please do not hesitate to call. Sincerely,  Vernice Sandifer.  [facility] [address] for Physical Rehabilitation  [phone]

## 2017-04-06 NOTE — ROUTINE PROCESS
Patient: Clarence Castillo Age: 77 y.o. Sex: female     Bedside and Verbal shift change report given to Maximiliano Dodge RN (oncoming nurse) by Dominguez Lisa RN (offgoing nurse). Report included the following information SBAR, Kardex, MAR and Recent Results. PATIENT GOALS FOR TODAY PATIENT PRIORITIES FOR TODAY   Goals are: Safety, Pain management  Updated on Whiteboard in Patients Room: no   Patient states his/her priorities are: to get pain relief  Updated on Whiteboard in Patients Room: no     SITUATION:   Code Status: Full Code Reason for Admission: Iliopsoas muscle hematoma, right, initial encounter  Pyelonephritis  Psoas hematoma, right, secondary to anticoagulant therapy, sequela  Iliopsoas muscle hematoma   Hospital day: 7 Problem List: Principal Problem:    Iliopsoas muscle hematoma (3/30/2017)    Active Problems:    Cardiomyopathy, nonischemic ([facility] (6/28/2011)      Biventricular implantable cardioverter-defibrillator in situ (6/28/2011)      Overview: Abdominal - done on 8/22/2012 by Dr. Jerson Hopkins      Diabetic peripheral neuropathy associated with type 2 diabetes mellitus ([facility] (6/28/2011)      Pyelonephritis (3/30/2017)      SIRS (systemic inflammatory response syndrome) ([facility] (3/30/2017)      Psoas hematoma, right, secondary to anticoagulant therapy (3/30/2017)       Attending Provider:   Lore Oconnor MD Allergies:    Allergies   Allergen Reactions    Vancomycin Itching    Ampicillin Itching    Bactrim [Sulfamethoxazole-Trimethoprim] Unknown (comments)    Blueberry Swelling     Causes throat swelling    Ciprofloxacin Itching    Codeine Other (comments)     Jumpy feeling    Crestor [Rosuvastatin] Itching    Darvocet A500 [Propoxyphene N-Acetaminophen] Itching    Demerol [Meperidine] Itching    Levaquin [Levofloxacin] Itching    Lipitor [Atorvastatin] Myalgia    Magnesium Oxide Itching     nausea    Minocin [Minocycline] Unknown (comments)    Pcn [Penicillins] Itching    Pravachol [Pravastatin] Swelling     Swelling in mouth     Sulfa (Sulfonamide Antibiotics) Itching    Ultracet [Tramadol-Acetaminophen] Itching    Vicodin [Hydrocodone-Acetaminophen] Unknown (comments)    Vytorin 10-10 [Ezetimibe-Simvastatin] Myalgia    Percodan [Oxycodone Hcl-Oxycodone-Asa] Itching      BACKGROUND:   Past Medical History:   Past Medical History:   Diagnosis Date    Acute venous embolism and thrombosis of deep veins of upper extremity ([address] 9/5/2012    Anemia in chronic kidney disease(285.21) 9/5/2012    Anticoagulated on Coumadin 9/5/2012    Biventricular implantable cardiac defibrillator in situ 4/28/05    upgrade to BiV AICD, gen change 4/08, pocket revision 10/09    Cardiac cath 08/15/1996    Patent coronaries. Elev LVEDP. EF 50-55%.  Cardiac echocardiogram 06/23/2015    Ltd study. EF 45-50%. Mild, diffuse hypk. Severe apical hypk. No mass or thrombus was clearly identified, although imaging was suboptimal.      Cardiac nuclear imaging test 06/19/2015    Fixed distal apical, distal septal defect more likely due to RV pacing than prior infarct. No ischemia. EF 46%. RWMA c/w RV pacing. Nondiagnostic EKG on pharm stress test.      Cardiovascular lower extremity venous duplex 09/04/2012    Acute, non-occlusive DVT in CFV on right. No DVT on left. No superficial thrombosis bilaterally.  Cardiovascular upper extremity venous duplex 08/27/2012    DVT in axillary vein on left. Left subclavian was not visualized.     CHF (congestive heart failure) (HCC)     Chronic kidney disease, stage IV (severe) ([facility] 9/5/2012    Congestive heart failure, unspecified     Diabetes ([facility]     Diabetic peripheral neuropathy associated with type 2 diabetes mellitus ([address] 6/28/2011    Difficult airway for intubation 08/22/2012    see anesthesia airway note    Generalized weakness 9/5/2012    Hypercholesterolemia     Hypertension     LBBB (left bundle branch block)     Nonischemic cardiomyopathy     Obesity (BMI 35.0-39.9 without comorbidity) ([facility] 3/13/2017    Pacemaker 12/04    status post DDD permanent pacemaker    Peripheral neuropathy ([facility]     secondary due to diabetes    Postoperative anemia due to acute blood loss 9/5/2012    Type II or unspecified type diabetes mellitus with neurological manifestations, not stated as uncontrolled 6/28/2011    Unspecified essential hypertension 9/5/2012     ASSESSMENT:   Neuro:  Neurologic State: Alert, Orientation Level: Oriented X4 CV:  Patient on Telemetry: Cardiac/Telemetry Monitor On: Yes    Box Number: [id number]   Cardiac Rhythm: Paced  Patient has a pace maker:   Endocrine   Recent Glucose Results:   Lab Results   Component Value Date/Time    GLUCPOC 131 (H) 04/05/2017 08:38 PM    GLUCPOC 145 (H) 04/05/2017 04:39 PM    GLUCPOC 151 (H) 04/05/2017 11:56 AM        Respiratory:  O2 Device: Nasal cannula       Supplemental O2 O2 Flow Rate (L/min): 2 l/min       Incentive Spirometery          GI  Current diet: DIET DIABETIC WITH OPTIONS Consistent Carb 1500-1600kcal; Regular; No Conc. Sweets  DIET NUTRITIONAL SUPPLEMENTS All Meals; [facility]            Abdominal Assessment: Intact               Bowel Sounds: Active    [REMOVED] Urinary Catheter 04/01/17 Perez-Criteria for Appropriate Use: Obstruction/retention       Reasons if patient has a perez: no perez   Patient Safety  Restraints:    Family notified:    Other Alternatives:     Fall Risk   Total Score: 4   Safety Measures: Bed/Chair-Wheels locked, Bed in low position, Call light within reach, Gripper socks, Side rails X 3, Bed/Chair alarm on VTE Prophylaxis     Sequential Compression Device: Bilateral     Patient Refused VTE Prophylaxis: Yes    WOUND (if present)  Wound Type:  none  Dressing present Dressing Present : No  Wound Concerns/Notes: no IV ACCESS     Reasons if patient has a central line: no central line     PAIN  Pain Assessment  Pain Intensity 1: 8 (04/06/17 7066)  Pain Location 1: Back  Pain Intervention(s) 1: Medication (see MAR)  Patient Stated Pain Goal: 0  Time of last intervention: 0400   Reassessment Completed: no Last Vitals:  Vitals:    04/05/17 1626 04/05/17 2000 04/06/17 0000 04/06/17 0400   BP: 149/64 156/72 131/75 162/69   Pulse: 75 (!) 58 63 69   Resp: 18 18 20 20   Temp: 97.9 °F (36.6 °C) 98.6 °F (37 °C) 97.6 °F (36.4 °C) 98.3 °F (36.8 °C)   SpO2: 98% 95% 100% 100%   Weight:          Last 3 Weights:  Last 3 Recorded Weights in this Encounter    03/31/17 2013 03/31/17 2351 04/02/17 0500   Weight: 101 kg (222 lb 11.2 oz) 105 kg (231 lb 8 oz) 101 kg (222 lb 9.6 oz)     Weight change:   LAB RESULTS  Recent Labs      04/05/17   0331 04/04/17 0300 04/03/17 0517   WBC  11.7  9.2  9.2   HGB  10.4*  9.3*  9.7*   HCT  31.0*  27.7*  29.0*   PLT  325  312  314     Recent Labs      04/05/17   0331  04/04/17   1150  04/04/17 0300 04/03/17 0517   NA  133*  135*   --   138   K  4.2  3.9   --   4.5   GLU  100*  66*   --   112*   BUN  28*  27*   --   26*   CREA  1.40*  1.08   --   1.04   CA  9.0  8.5   --   9.4   MG   --    --    --   2.5*   INR  1.2   --   1.3*  1.4*      RECOMMENDATIONS AND DISCHARGE PLANNING   1. Discharge plan for patient // Needs or barriers to disharge: none . 2. Estimated Discharge Date: TBD Posted on Whiteboard in Patients Room: no    \"HEALS\" SAFETY CHECK   A safety check occurred in the patient's room between off going nurse and oncoming nurse listed above. The safety check included the below items  Area Items   H  High Alert Meds  - Verify all high alert medication drips (heparin, PCA, etc.)   E  Equipment - Suction is set up for ALL patients (with yanker)  - Red plugs utilized for all equipment (IV pumps, etc.)  - WOWs wiped down at end of shift.  - Room stocked with oxygen, suction, and other unit-specific supplies   A  Alarms - Bed alarm is set for fall risk patients  - Ensure chair alarm is in place and activated if patient is up in a chair L  Lines - Check IV for any infiltration  - Krueger bag is empty if patient has a Krueger   - Tubing and IV bags are labeled   S  Safety   - Room is clean, patient is clean, and equipment is clean. - Ensure room is clear and free of clutter  - Hallways are clear from equipment besides carts.    - Fall bracelet on for fall risk patients  - Suction is set up for ALL patients (with gautamker)  - Isolation precautions followed, supplies available outside room, sign posted   Nickolas Sanchez RN

## 2017-04-06 NOTE — IP AVS SNAPSHOT
Current Discharge Medication List  
  
START taking these medications Dose & Instructions Dispensing Information Comments Morning Noon Evening Bedtime  
 acetaminophen 325 mg tablet Commonly known as:  TYLENOL Your last dose was: Your next dose is:    
   
   
 Dose:  650 mg Take 2 Tabs by mouth every four (4) hours as needed (for fever or pain level less than 5/10). Indications: Fever, Pain Quantity:  30 Tab Refills:  0  
     
   
   
   
  
 bumetanide 1 mg tablet Commonly known as:  Londell Dancer Your last dose was: Your next dose is: TAKE 1 TABLET TWICE A DAY Quantity:  180 Tab Refills:  1  
     
   
   
   
  
 docusate sodium 100 mg capsule Commonly known as:  Ondina Silk Your last dose was: Your next dose is:    
   
   
 Dose:  100 mg Take 1 Cap by mouth daily (after breakfast). Indications: Constipation Quantity:  15 Cap Refills:  0  
     
   
   
   
  
 gabapentin 400 mg capsule Commonly known as:  NEURONTIN Replaces:  gabapentin 300 mg tablet Your last dose was: Your next dose is:    
   
   
 Dose:  400 mg Take 1 Cap by mouth two (2) times a day. Indications: NEUROPATHIC PAIN Quantity:  30 Cap Refills:  0  
     
   
   
   
  
 oxyCODONE-acetaminophen 7.5-325 mg per tablet Commonly known as:  PERCOCET 7.5 Your last dose was: Your next dose is:    
   
   
 Dose:  1 Tab Take 1 Tab by mouth every four (4) hours as needed (for pain level greater than 4/10). Max Daily Amount: 6 Tabs. Indications: Pain Quantity:  50 Tab Refills:  0  
     
   
   
   
  
 senna-docusate 8.6-50 mg per tablet Commonly known as:  Silvia Steel Your last dose was: Your next dose is:    
   
   
 Dose:  2 Tab Take 2 Tabs by mouth daily (after dinner). Indications: Constipation Quantity:  30 Tab Refills:  0 CONTINUE these medications which have CHANGED Dose & Instructions Dispensing Information Comments Morning Noon Evening Bedtime  
 allopurinol 100 mg tablet Commonly known as:  Reece Riedel What changed:  when to take this Your last dose was: Your next dose is:    
   
   
 Dose:  100 mg Take 1 Tab by mouth daily. Indications: GOUT  
 Quantity:  15 Tab Refills:  0  
     
   
   
   
  
 carvedilol 6.25 mg tablet Commonly known as:  Pink Parrot What changed:   
- medication strength 
- how much to take Your last dose was: Your next dose is:    
   
   
 Dose:  6.25 mg Take 1 Tab by mouth two (2) times daily (with meals). Indications: hypertension Quantity:  30 Tab Refills:  0  
     
   
   
   
  
 cholecalciferol 1,000 unit tablet Commonly known as:  VITAMIN D3 What changed:   
- medication strength 
- how much to take Your last dose was: Your next dose is:    
   
   
 Dose:  2000 Units Take 2 Tabs by mouth daily. Indications: PREVENTION OF VITAMIN D DEFICIENCY Quantity:  30 Tab Refills:  0  
     
   
   
   
  
 insulin glargine 100 unit/mL injection Commonly known as:  LANTUS What changed:   
- how much to take - when to take this Your last dose was: Your next dose is:    
   
   
 Dose:  15 Units 15 Units by SubCUTAneous route nightly. Indications: type 2 diabetes mellitus Quantity:  1 Vial  
Refills:  0 CONTINUE these medications which have NOT CHANGED Dose & Instructions Dispensing Information Comments Morning Noon Evening Bedtime  
 cyclobenzaprine 10 mg tablet Commonly known as:  FLEXERIL Your last dose was: Your next dose is:    
   
   
 Dose:  10 mg Take 10 mg by mouth as needed for Muscle Spasm(s). Indications: MUSCLE SPASM Refills:  0  
     
   
   
   
  
 ferrous sulfate 325 mg (65 mg iron) tablet Your last dose was: Your next dose is:    
   
   
 Dose:  325 mg Take 1 Tab by mouth two (2) times daily (with meals). Quantity:  30 Tab Refills:  0  
     
   
   
   
  
 insulin aspart 100 unit/mL injection Commonly known as:  Amanda Tucker Your last dose was: Your next dose is: INITIATE INSULIN CORRECTIVE PROTOCOL (HR): Normal Insulin Sensitivity  For Blood Sugar (mg/dL) of:    Less than 150 =   0 units          150 -199 =   2 units 200 -249 =   4 units 250 -299 =   6 units 300 -349 =   8 units 350 and above =   10 units If 2 glucose readings are above 200 mg/dL Quantity:  10 mL Refills:  6 KLOR-CON M20 20 mEq tablet Generic drug:  potassium chloride Your last dose was: Your next dose is:    
   
   
 Dose:  20 mEq Take 20 mEq by mouth two (2) times a day. Indications: HYPOKALEMIA PREVENTION Refills:  0  
     
   
   
   
  
 pravastatin 40 mg tablet Commonly known as:  PRAVACHOL Your last dose was: Your next dose is:    
   
   
 Dose:  40 mg Take 40 mg by mouth nightly. Indications: DYSLIPIDEMIA Refills:  0 SITagliptin 50 mg tablet Commonly known as:  Del Couch Your last dose was: Your next dose is:    
   
   
 Dose:  50 mg Take 50 mg by mouth daily. Indications: type 2 diabetes mellitus Refills:  0 ZOFRAN ODT 4 mg disintegrating tablet Generic drug:  ondansetron Your last dose was: Your next dose is:    
   
   
 Dose:  4 mg Take 4 mg by mouth every eight (8) hours as needed for Nausea. Refills:  0 STOP taking these medications   
 gabapentin 300 mg tablet Commonly known as:  NEURONTIN Replaced by:  gabapentin 400 mg capsule Where to Get Your Medications These medications were sent to [address], [address] [facility], [address] [place] [address] Phone:  [phone]  
  bumetanide 1 mg tablet These medications were sent to [id number] [facility], [address] [facility]  [address] [facility] [place], [address] [address] Phone:  [phone]  
  acetaminophen 325 mg tablet  
 allopurinol 100 mg tablet  
 carvedilol 6.25 mg tablet  
 cholecalciferol 1,000 unit tablet  
 docusate sodium 100 mg capsule  
 gabapentin 400 mg capsule  
 insulin glargine 100 unit/mL injection  
 senna-docusate 8.6-50 mg per tablet Information on where to get these meds will be given to you by the nurse or doctor. ! Ask your nurse or doctor about these medications  
  oxyCODONE-acetaminophen 7.5-325 mg per tablet

## 2017-04-06 NOTE — ROUTINE PROCESS
Accu chek blood sugar 62. Asyptomatic. 118ml of grape jce given. Breakfast trays will be delivered @ 0730.

## 2017-04-06 NOTE — PROGRESS NOTES
CM met in room with pt and her spouse Kandis Owen ([phone]). Pt is alert and oriented in room. Pt indicates being independent with ADLs  prior to admission and her plan is to benefit from short term rehab in ARU. CM contacted admission coordinator in ARU to request screening. Listing of SNFs was also provided to pt. She felt overwhelmed with information being received and requested for CM to return at a later time. PHU communicated with pt's daughter to request assistance to mother for SNF choices. Pt's daughter Loren Balderas provided current medical insurance information which has been updated by Registration. 1:00pm  CM contacted ARU admission coordinator who mentions that she will submit information to insurance for auth. CM was informed that OT notes did not have any recommendations for placement in notes and in order for insurance to authorize for possible placement, the information is required. Physician has been informed of delay.

## 2017-04-06 NOTE — DISCHARGE SUMMARY
[facility]  TRANSFER SUMMARY    Name:  María Elena Sullivan  MR#:  [id number]  :  1950  Account #:  [de-identified]  Date of Adm:  2017  Date of Transfer:  2017      DISCHARGE DIAGNOSES  1. Right iliopsoas muscle hematoma in the setting of Coumadin use. 2. Leukocytosis present on admission, likely felt to be secondary to the  hematoma. 3. Nonischemic cardiomyopathy, ejection fraction of 45%, no  congestive heart failure. 4. Pyelonephritis at admission; patient has completed a course of  antibiotics. 5. History of chronic anticoagulation with Coumadin, now discontinued. 6. Multiple allergies. 7. History of complete heart block; patient has a biventricular automatic  implantable cardioverter defibrillator. 8. Type 2 diabetes. 9. Anemia of chronic disease. 10. Chronic kidney disease, stage III. 11. Hyponatremia, which has shown some improvement. 12. Coumadin coagulopathy at the time of admission, which has shown  improvement. 13. History of fall at home. 14. Dyslipidemia. 15. Obesity. 16. Urinary retention, which has shown improvement. 17. Severe protein-calorie malnutrition; patient is receiving  supplements. DISCHARGE MEDICATIONS  Include:  1. Dulcolax suppository daily as needed for constipation. 2. Colace 100 mg p.o. twice daily. 3. Folvite 1 mg p.o. daily. 4. Percocet 7.5/325 one tablet p.o. every 6 hours p.r.n.  5. MiraLax 17 g p.o. daily. 6. Coreg 12.5 mg p.o. twice daily. 7. Lantus insulin 24 units subcutaneous twice daily. 8. Allopurinol 100 mg p.o. twice daily. 9. Ferrous sulfate 325 mg p.o. 2 times daily. 10. Neurontin 300 mg p.o. twice daily. 11. Humalog insulin sliding scale. 12. Pravachol 40 mg p.o. daily. 13. Vitamin D 5000 units p.o. daily. INSTRUCTIONS  1. Diet is diabetic, cardiac. Glucerna shake 3 times daily. Check  fingerstick blood sugars before meals and at bedtime. 2. Activity as tolerated. 3. Fall precautions.   4. Aspiration precautions. 5. Decubitus precautions. 6. PT, OT evaluate and treat. 7. Incentive spirometry, use as directed. 8. Graded compression stockings bilateral lower extremities, use as  directed. 9. Check a CBC, CMP, magnesium in 3 days. 10. Follow up with PCP in 1 week. 11. Follow up with Cardiology in 2 weeks. 12. Follow up with Vascular Surgery in 2 weeks. The patient is hemodynamically stable at this time. Care management  has been involved and we are waiting for placement of the patient to  a skilled nursing facility or rehab unit. I have discussed the plan with  the care manager, Cheryl Palmer. I have also discussed with the patient and  she agrees. I also called and discussed with the patient's daughter,  Emily Baltazar, and she agrees with this plan. TIME SPENT: Total time for the discharge was more than 35 minutes.         Chema Caruso MD    VT / TLM  D:  04/06/2017   09:08  T:  04/06/2017   09:46  Job #:  [id number]

## 2017-04-06 NOTE — DIABETES MGMT
GLYCEMIC CONTROL PLAN OF CARE    Pt with hypoglycemia this morning and treated with 118 mL grape juice followed by breakfast. Pt reports her appetite has picked up and she is feeling better since arrival. Pt does have hs snack sometimes depending on BG the night before. Encouraged pt to ask for snack if her hs BG is lower than her normal. Pt receptive. Recent BG:  Results for Tina Mabry (MRN [id number]) as of 4/6/2017 09:29   Ref. Range 4/5/2017 16:39 4/5/2017 20:38 4/6/2017 07:07 4/6/2017 08:00   GLUCOSE,FAST - POC Latest Ref Range: 70 - 110 mg/dL 145 (H) 131 (H) 62 (L) 110     Diet: diabetic with options, consistent carbohydrate 5427-5072 kcal no conc sweets, glucerna all meals  Current Meal Intake:  Patient Vitals for the past 100 hrs:   % Diet Eaten   04/05/17 1754 100 %   04/05/17 1351 100 %   04/03/17 0956 50 %     TDD 4/5: 48 units lantus    Goal: Pt BG will be within target range by 4/9/17. Will continue to monitor inpatient for intervention. Per Dr. Jeevan Lagunas pt to be d/c today to possible SNF.     Guillermina Alvarez MS, [address] [place] [address], [facility]  Pager: [phone]

## 2017-04-07 LAB
25(OH)D3 SERPL-MCNC: 67 NG/ML (ref 30–100)
ANION GAP BLD CALC-SCNC: 7 MMOL/L (ref 3–18)
BASOPHILS # BLD AUTO: 0 K/UL (ref 0–0.1)
BASOPHILS # BLD: 0 % (ref 0–2)
BUN SERPL-MCNC: 19 MG/DL (ref 7–18)
BUN/CREAT SERPL: 16 (ref 12–20)
CALCIUM SERPL-MCNC: 8.4 MG/DL (ref 8.5–10.1)
CHLORIDE SERPL-SCNC: 100 MMOL/L (ref 100–108)
CO2 SERPL-SCNC: 30 MMOL/L (ref 21–32)
CREAT SERPL-MCNC: 1.22 MG/DL (ref 0.6–1.3)
DIFFERENTIAL METHOD BLD: ABNORMAL
EOSINOPHIL # BLD: 0.2 K/UL (ref 0–0.4)
EOSINOPHIL NFR BLD: 2 % (ref 0–5)
ERYTHROCYTE [DISTWIDTH] IN BLOOD BY AUTOMATED COUNT: 14.8 % (ref 11.6–14.5)
GLUCOSE BLD STRIP.AUTO-MCNC: 101 MG/DL (ref 70–110)
GLUCOSE BLD STRIP.AUTO-MCNC: 101 MG/DL (ref 70–110)
GLUCOSE BLD STRIP.AUTO-MCNC: 118 MG/DL (ref 70–110)
GLUCOSE BLD STRIP.AUTO-MCNC: 239 MG/DL (ref 70–110)
GLUCOSE BLD STRIP.AUTO-MCNC: 65 MG/DL (ref 70–110)
GLUCOSE BLD STRIP.AUTO-MCNC: 68 MG/DL (ref 70–110)
GLUCOSE SERPL-MCNC: 65 MG/DL (ref 74–99)
HCT VFR BLD AUTO: 26.5 % (ref 35–45)
HGB BLD-MCNC: 8.8 G/DL (ref 12–16)
LYMPHOCYTES # BLD AUTO: 22 % (ref 21–52)
LYMPHOCYTES # BLD: 2 K/UL (ref 0.9–3.6)
MAGNESIUM SERPL-MCNC: 2.2 MG/DL (ref 1.6–2.6)
MCH RBC QN AUTO: 26.4 PG (ref 24–34)
MCHC RBC AUTO-ENTMCNC: 33.2 G/DL (ref 31–37)
MCV RBC AUTO: 79.6 FL (ref 74–97)
MONOCYTES # BLD: 0.6 K/UL (ref 0.05–1.2)
MONOCYTES NFR BLD AUTO: 7 % (ref 3–10)
NEUTS SEG # BLD: 6.2 K/UL (ref 1.8–8)
NEUTS SEG NFR BLD AUTO: 69 % (ref 40–73)
PLATELET # BLD AUTO: 271 K/UL (ref 135–420)
PMV BLD AUTO: 9.5 FL (ref 9.2–11.8)
POTASSIUM SERPL-SCNC: 4.1 MMOL/L (ref 3.5–5.5)
RBC # BLD AUTO: 3.33 M/UL (ref 4.2–5.3)
SODIUM SERPL-SCNC: 137 MMOL/L (ref 136–145)
URATE SERPL-MCNC: 3.8 MG/DL (ref 2.6–7.2)
WBC # BLD AUTO: 9 K/UL (ref 4.6–13.2)

## 2017-04-07 RX ORDER — POLYETHYLENE GLYCOL 3350 17 G/17G
17 POWDER, FOR SOLUTION ORAL DAILY
Status: DISCONTINUED | OUTPATIENT
Start: 2017-04-07 | End: 2017-04-10

## 2017-04-07 RX ORDER — INSULIN GLARGINE 100 [IU]/ML
25 INJECTION, SOLUTION SUBCUTANEOUS
Status: DISCONTINUED | OUTPATIENT
Start: 2017-04-08 | End: 2017-04-12

## 2017-04-07 RX ORDER — ALLOPURINOL 100 MG/1
100 TABLET ORAL DAILY
Status: DISCONTINUED | OUTPATIENT
Start: 2017-04-08 | End: 2017-04-20 | Stop reason: HOSPADM

## 2017-04-07 RX ORDER — INSULIN GLARGINE 100 [IU]/ML
12 INJECTION, SOLUTION SUBCUTANEOUS
Status: DISCONTINUED | OUTPATIENT
Start: 2017-04-07 | End: 2017-04-07

## 2017-04-07 RX ORDER — AMOXICILLIN 250 MG
2 CAPSULE ORAL
Status: DISCONTINUED | OUTPATIENT
Start: 2017-04-07 | End: 2017-04-20 | Stop reason: HOSPADM

## 2017-04-07 RX ORDER — OXYCODONE AND ACETAMINOPHEN 5; 325 MG/1; MG/1
1 TABLET ORAL 3 TIMES DAILY
Status: DISCONTINUED | OUTPATIENT
Start: 2017-04-08 | End: 2017-04-10

## 2017-04-07 RX ORDER — OXYCODONE AND ACETAMINOPHEN 5; 325 MG/1; MG/1
1 TABLET ORAL
Status: DISCONTINUED | OUTPATIENT
Start: 2017-04-07 | End: 2017-04-10

## 2017-04-07 RX ORDER — INSULIN GLARGINE 100 [IU]/ML
24 INJECTION, SOLUTION SUBCUTANEOUS
Status: DISCONTINUED | OUTPATIENT
Start: 2017-04-08 | End: 2017-04-07

## 2017-04-07 RX ORDER — DOCUSATE SODIUM 100 MG/1
100 CAPSULE, LIQUID FILLED ORAL
Status: DISCONTINUED | OUTPATIENT
Start: 2017-04-08 | End: 2017-04-20 | Stop reason: HOSPADM

## 2017-04-07 RX ADMIN — ACETAMINOPHEN 650 MG: 325 TABLET ORAL at 02:34

## 2017-04-07 RX ADMIN — LACTOBACILLUS TAB 2 TABLET: TAB at 09:21

## 2017-04-07 RX ADMIN — LACTOBACILLUS TAB 2 TABLET: TAB at 18:01

## 2017-04-07 RX ADMIN — CARVEDILOL 12.5 MG: 12.5 TABLET, FILM COATED ORAL at 09:21

## 2017-04-07 RX ADMIN — GABAPENTIN 300 MG: 300 CAPSULE ORAL at 18:02

## 2017-04-07 RX ADMIN — OXYCODONE HYDROCHLORIDE AND ACETAMINOPHEN 1 TABLET: 5; 325 TABLET ORAL at 22:50

## 2017-04-07 RX ADMIN — OXYCODONE HYDROCHLORIDE AND ACETAMINOPHEN 1 TABLET: 7.5; 325 TABLET ORAL at 06:26

## 2017-04-07 RX ADMIN — ACETAMINOPHEN 650 MG: 325 TABLET ORAL at 10:04

## 2017-04-07 RX ADMIN — DOCUSATE SODIUM 100 MG: 100 CAPSULE, LIQUID FILLED ORAL at 09:21

## 2017-04-07 RX ADMIN — GABAPENTIN 300 MG: 300 CAPSULE ORAL at 09:22

## 2017-04-07 RX ADMIN — ALLOPURINOL 100 MG: 100 TABLET ORAL at 09:21

## 2017-04-07 RX ADMIN — FOLIC ACID 1 MG: 1 TABLET ORAL at 09:21

## 2017-04-07 RX ADMIN — POLYETHYLENE GLYCOL 3350 17 G: 17 POWDER, FOR SOLUTION ORAL at 18:06

## 2017-04-07 RX ADMIN — INSULIN GLARGINE 24 UNITS: 100 INJECTION, SOLUTION SUBCUTANEOUS at 09:22

## 2017-04-07 RX ADMIN — OXYCODONE HYDROCHLORIDE AND ACETAMINOPHEN 1 TABLET: 7.5; 325 TABLET ORAL at 12:21

## 2017-04-07 RX ADMIN — OXYCODONE HYDROCHLORIDE AND ACETAMINOPHEN 1 TABLET: 5; 325 TABLET ORAL at 18:03

## 2017-04-07 RX ADMIN — CARVEDILOL 12.5 MG: 12.5 TABLET, FILM COATED ORAL at 18:02

## 2017-04-07 RX ADMIN — STANDARDIZED SENNA CONCENTRATE AND DOCUSATE SODIUM 2 TABLET: 8.6; 5 TABLET, FILM COATED ORAL at 18:01

## 2017-04-07 RX ADMIN — ACETAMINOPHEN 650 MG: 325 TABLET ORAL at 16:31

## 2017-04-07 NOTE — PROGRESS NOTES
NUTRITION    BPA/MST Referral    Nutrition Consult: General Nutrition Management and Supplements     RECOMMENDATIONS / PLAN:     - Continue with current nutrition interventions   - Continue RD inpatient monitoring and evaluation. NUTRITION INTERVENTIONS & DIAGNOSIS:     [x] Meals/Snacks: modified diet   [x] Medical food supplementation: glucerna shake TID  [x] Vitamin/mineral supplementation: folic acid    Nutrition Diagnosis: Inadequate energy intake related to decreased appetite as evidenced by decreased meal intake PTA    ASSESSMENT:     Subjective/Objective:  Patient unavailable at time of visit. Reported decreased appetite and meal intake and unplanned weight loss PTA per nutrition screen. Noted supplements ordered. Meal intake fair/good per chart. Average po intake adequate to meet patients estimated nutritional needs:   [] Yes     [] No   [x] Unable to determine at this time    Diet: DIET DIABETIC CONSISTENT CARB Regular; AHA-LOW-CHOL FAT; No Conc.  Sweets  DIET NUTRITIONAL SUPPLEMENTS All Meals; [facility]      Food Allergies: blueberries  Current Appetite:   [] Good     [] Fair     [] Poor     [x] Other: unknown   Appetite/meal intake prior to admission:   [] Good     [] Fair     [x] Poor (per nutrition screen)    [] Other:  Feeding Limitations:  [] Swallowing difficulty    [] Chewing difficulty    [] Other:  Current Meal Intake:   Patient Vitals for the past 100 hrs:   % Diet Eaten   04/07/17 1300 50 %   04/07/17 0930 75 %     BM: 4/5  Skin Integrity:  Callus on right foot   Edema: none   Pertinent Medications: Reviewed; colace, miralax, dulcolax      Recent Labs      04/07/17   0616  04/05/17   0331   NA  137  133*   K  4.1  4.2   CL  100  96*   CO2  30  27   GLU  65*  100*   BUN  19*  28*   CREA  1.22  1.40*   CA  8.4*  9.0   MG  2.2   --        Intake/Output Summary (Last 24 hours) at 04/07/17 1832  Last data filed at 04/07/17 1300   Gross per 24 hour   Intake              596 ml   Output 0 ml   Net              596 ml       Anthropometrics:  Ht Readings from Last 1 Encounters:   04/06/17 5' 7\" (1.702 m)     Last 3 Recorded Weights in this Encounter    04/06/17 2202   Weight: 105.7 kg (233 lb)     Body mass index is 36.49 kg/(m^2). Weight History:  Per chart review, patient reported usual body weight is 240 lbs. Stated felt that she had lost weight PTA; clothes fit looser. Noted weight loss of 7 lbs (2.9%) in past 2-4 weeks PTA per chart history    Weight Metrics 4/6/2017 4/6/2017 3/30/2017 3/16/2017 3/14/2017 3/13/2017 2/9/2017   Weight 233 lb 227 lb 1.6 oz - 240 lb 243 lb 245 lb 245 lb   BMI 36.49 kg/m2 - 35.57 kg/m2 37.59 kg/m2 38.06 kg/m2 38.37 kg/m2 38.37 kg/m2        Admitting Diagnosis: Debility  Psoas hematoma, right, secondary to anticoagulant therapy  Past Medical History:   Diagnosis Date    Benign hypertensive heart disease with systolic CHF, NYHA class 2 ([facility] [address] 9/5/2012    Biventricular implantable cardioverter-defibrillator in situ 04/28/2005    Upgraded to BiV AICD; gen change 4/2008; pocket revision 10/2009; Abdominal - done on 8/22/2012 by Dr. Ashwin Lloyd Cardiac cath 08/15/1996    Patent coronaries. Elev LVEDP. EF 50-55%.  Cardiac echocardiogram 06/23/2015    Ltd study. EF 45-50%. Mild, diffuse hypk. Severe apical hypk. No mass or thrombus was clearly identified, although imaging was suboptimal.      Cardiac nuclear imaging test 06/19/2015    Fixed distal apical, distal septal defect more likely due to RV pacing than prior infarct. No ischemia. EF 46%. RWMA c/w RV pacing. Nondiagnostic EKG on pharm stress test.      Cardiovascular lower extremity venous duplex 09/04/2012    Acute, non-occlusive DVT in CFV on right. No DVT on left. No superficial thrombosis bilaterally.  Cardiovascular upper extremity venous duplex 08/27/2012    DVT in axillary vein on left. Left subclavian was not visualized.     Chronic anemia 9/5/2012    Chronic systolic heart failure (HCC)     Decreased calculated glomerular filtration rate (GFR) 3/30/2017    Calculated GFR equivalent to that of CKD stage 3 = 30-59 ml/min    Diabetic neuropathy associated with type 2 diabetes mellitus ([facility] [address] 6/28/2011    Difficult airway for intubation 08/22/2012    see anesthesia airway note    Dyslipidemia 6/28/2011    Gout     History of complete heart block 6/28/2011    History of Coumadin therapy     Anticoagulation for DVT of the LUE; Discontinued on 3/30/2017    History of deep venous thrombosis 9/5/2012    Left upper extremity    History of pyelonephritis 3/30/2017    Left bundle branch block (LBBB) on electrocardiogram 6/28/2011    Nonischemic cardiomyopathy ([facility] [address] 6/28/2011    Obesity (BMI 35.0-39.9 without comorbidity) ([facility] [address] 3/13/2017    Obstructive sleep apnea on CPAP 2/7/2012    Psoas hematoma, right, secondary to anticoagulant therapy 3/30/2017    Type 2 diabetes mellitus with diabetic neuropathy ([facility] [address] 6/28/2011       Education Needs:        [x] None identified  [] Identified - Not appropriate at this time  []  Identified and addressed - refer to education log  Learning Limitations:   [x] None identified  [] Identified    Cultural, Islam & ethnic food preferences:  [x] None identified    [] Identified and addressed     ESTIMATED NUTRITION NEEDS:     Calories: 9534-5892 kcal (MSJx1.2-1.3) based on  [x] Actual BW: 106 kg      [] IBW   CHO: 245-265 gm (50% kcal)   Protein: 127-138 gm (1.2-1.3 gm/kg) based on  [x] Actual BW      [] IBW   Fluid: 1 mL/kcal     MONITORING & EVALUATION:     Nutrition Goal(s):   1. Po intake of meals will meet >75% of patient estimated nutritional needs within the next 7 days.   Outcome:  [] Met/Ongoing    []  Not Met    [x] New/Initial Goal     Monitoring:   [x] Diet tolerance   [x] Meal intake   [x] Supplement intake   [] GI symptoms/ability to tolerate po diet   [] Respiratory status   [] Plan of care      Previous Recommendations (for follow-up assessments only):     []   Implemented       []   Not Implemented (RD to address)     [x] No Recommendation Made     Discharge Planning: diabetic, cardiac diet  [x] Participated in care planning, discharge planning, & interdisciplinary rounds as appropriate      Geovany Martinez, [address] [place] [address]   Pager: [phone]

## 2017-04-07 NOTE — PROGRESS NOTES
Problem: Self Care Deficits Care Plan (Adult)  Goal: *Therapy Goal (Edit Goal, Insert Text)  Long Term Goals (to be met upon discharge date) in order to increase pts functional independence and safety, and decrease burden of care:  1. Pt will perform grooming with independence. 2. Pt will perform UB bathing with modified independence. 3. Pt will perform LB bathing with modified independence. 4. Pt will perform shower transfer with modified independence. 5. Pt will perform UB dressing with independence. 6. Pt will perform LB dressing with modified independence. 7. Pt will perform toileting task with modified independence. 8. Pt will perform toilet transfer with modified independence. 9. Pt will perform an IADL task while standing with modified independence. Short Term Weekly Goals for (4/7/17 to 4/14/17) in order to increase pts functional independence and safety, and decrease burden of care:  1. Pt will perform grooming with independence. 2. Pt will perform UB bathing with modified independence. 3. Pt will perform LB bathing with supervision. 4. Pt will perform shower transfer with supervision. 5. Pt will perform UB dressing with independence. 6. Pt will perform LB dressing with supervision. 7. Pt will perform toileting task with supervision. 8. Pt will perform toilet transfer with supervision. OCCUPATIONAL THERAPY EXAMINATION     Patient Name: Benjamin Obregon  Patient Age: 77 y.o. Past Medical History:   Past Medical History:   Diagnosis Date    Benign hypertensive heart disease with systolic CHF, NYHA class 2 ([facility] [address] 9/5/2012    Biventricular implantable cardioverter-defibrillator in situ 04/28/2005     Upgraded to BiV AICD; gen change 4/2008; pocket revision 10/2009; Abdominal - done on 8/22/2012 by Dr. Tompkins [facility] Cardiac cath 08/15/1996     Patent coronaries. Elev LVEDP. EF 50-55%.  Cardiac echocardiogram 06/23/2015     Ltd study. EF 45-50%. Mild, diffuse hypk.   Severe apical hypk.  No mass or thrombus was clearly identified, although imaging was suboptimal.      Cardiac nuclear imaging test 06/19/2015     Fixed distal apical, distal septal defect more likely due to RV pacing than prior infarct. No ischemia. EF 46%. RWMA c/w RV pacing. Nondiagnostic EKG on pharm stress test.      Cardiovascular lower extremity venous duplex 09/04/2012     Acute, non-occlusive DVT in CFV on right. No DVT on left. No superficial thrombosis bilaterally.  Cardiovascular upper extremity venous duplex 08/27/2012     DVT in axillary vein on left. Left subclavian was not visualized.     Chronic anemia 9/5/2012    Chronic systolic heart failure (HCC)      Decreased calculated glomerular filtration rate (GFR) 3/30/2017     Calculated GFR equivalent to that of CKD stage 3 = 30-59 ml/min    Diabetic neuropathy associated with type 2 diabetes mellitus ([address] 6/28/2011    Difficult airway for intubation 08/22/2012     see anesthesia airway note    Dyslipidemia 6/28/2011    History of complete heart block 6/28/2011    History of Coumadin therapy       Anticoagulation for chronic atrial fibrillation; Discontinued on 3/30/2017    History of deep venous thrombosis 9/5/2012     Left upper extremity    History of pyelonephritis 3/30/2017    Left bundle branch block (LBBB) on electrocardiogram 6/28/2011    Nonischemic cardiomyopathy ([address] 6/28/2011    Obesity (BMI 35.0-39.9 without comorbidity) ([address] 3/13/2017    Obstructive sleep apnea on CPAP 2/7/2012    Psoas hematoma, right, secondary to anticoagulant therapy 3/30/2017    Type 2 diabetes mellitus with diabetic neuropathy ([address] 6/28/2011         Medical Diagnosis:  Debility  Psoas hematoma, right, secondary to anticoagulant therapy Psoas hematoma, right, secondary to anticoagulant therapy   Therapy Diagnosis:   Difficulty with ADLs  [X]     Difficulty with functional transfers  [X]     Difficulty with ambulation  [X]     Difficulty with IADLs [X]        Problem List:    Decreased strength B UE  [X]     Decreased strength trunk/core  [X]     Decreased AROM   [ ]     Decreased endurance  [X]     Decreased balance sitting  [X]     Decreased balance standing  [X]     Pain   [X]     Decreased PROM  [ ]        Functional Limitations:   Decreased independence with ADL  [X]     Decreased independence with functional transfers  [X]     Decreased independence with ambulation  [X]     Decreased independence with IADL  [X]        Previous Functional Level: Independent     Home Environment: Home Situation  Home Environment: Private residence  # Steps to Enter: 3  One/Two Story Residence: One story  Living Alone: No  Support Systems: Child(lea), Family member(s), Spouse/Significant Other/Partner  Patient Expects to be Discharged to[de-identified] Private residence  Current DME Used/Available at Home: Walker, rolling  Tub or Shower Type: Shower     Precautions: Falls     Time In: 1130  Time Out[de-identified] 1230     Time In: 1400  Time Out:1430     Pain at start of tx: 8/10 R buttock/groin  Pain at stop of tx: 8-9/10 R buttock/groin     Patient identified with name and :yes     Objective: (3961-9023) OT evaluation initiated and completed. Please see below for level of assistance with ADLs and functional transfers/mobility. (3812-0700) Pt performed transfer from recliner to w/c with CGA using RW with cues for hand placement and upright posture as pt remains in a forward flexed position throughout transfer. Pt managed w/c to/from room and gym of ~150 ft using B UEs with increased time. Pt then focused on standing tolerance while participating in task of placing pegs on vertical pegboard. Pt stood with SBA from w/c to RW x2 trials of 2 mins 10 secs and 1 min 55 secs.  While standing, pt was provided cues for increasing R LE WBing which pt was unable to consistently tolerate due to 8-9/10 R LE pain especially with WBing, and cues for upright posture due to forward flexed trunk throughout activity. Pt also sat on edge of w/c while reaching anteriorly to obtain pegs from board with focus in increased WBing through LEs. OT also assisted pt to toilet with using RW from w/c to [facility] over toilet with CGA-min A with cues for posture and hand placement. Pt was directed to use call bell for nursing to assist pt off toilet when ready due to end of OT session.         Outcome Measures:                  MMT Initial Assessment    Right Upper Extremity  Left Upper Extremity    UE AROM  WFL; Grossly 4/5  WFL; Grossly 4/5   Shoulder flexion       Shoulder extension       Shoulder ABDuction       Shoulder ADDUction       Elbow Flexion       Elbow Extension       Wrist Extension/Flexion              0/5       No palpable muscle contraction  1/5       Palpable muscle contraction, no joint movement  2-/5      Less than full range of motion in gravity eliminated position  2/5       Able to complete full range of motion in gravity eliminated position  2+/5     Able to initiate movement against gravity  3-/5      More than half but not full range of motion against gravity  3/5       Able to complete full range of motion against gravity  3+/5     Completes full range of motion against gravity with minimal resistance  4-/5      Completes full range of motion against gravity with minimal-moderate resistance  4/5       Completes full range of motion against gravity with moderate resistance  4+/5     Completes full range of motion against gravity with moderate-maximum resistance  5/5       Completes full range of motion against gravity with maximum resistance     Sensation: B UEs grossly intact  Coordination: B UEs grossly intact      FIM SCORES Initial Assessment   Bladder - level of assist NT   Bladder - accident frequency score NT   Bowel - level of assist NT   Bowel - accident frequency score NT   Pain level Numeric (0 - 10)   Pain Location   Right   Pain Description Aching   Please see IRC Interdisciplinary Eval: Coordination/Balance Section for details regarding FIM score description. COGNITION/PERCEPTION Initial Assessment   Premorbid Reading Status Literate   Premorbid Writing Status  (NT)   Arousal/Alertness Generalized responses   Orientation Level Oriented X4   Visual Fields  [facility])   Praxis Intact   Body Scheme Appears intact       COMPREHENSION MODE Initial Assessment   Primary Mode of Comprehension Auditory   Hearing Aide None   Corrective Lenses     Score 6       EXPRESSION Initial Assessment   Primary Mode of Expression Verbal   Score 6   Comments         SOCIAL INTERACTION/PRAGMATICS Initial Assessment   Score 6   Comments         PROBLEM SOLVING Initial Assessment   Score 6   Comments         MEMORY Initial Assessment   Score 6   Comments         EATING Initial Assessment   Functional Level 7   Comments         GROOMING Initial Assessment   Functional Level 5     Oral Hygiene FIM:5   Tasks completed by patient Washed face   Comments Set-up of needed items as pt performed task while seated. BATHING Initial Assessment   Functional Level 3   Body parts patient bathed Abdomen, Arm, left, Arm, right, Buttocks, Chest, Lower leg and foot, left, Lower leg and foot, right, Linnette area, Thigh, left, Thigh, right   Comments Pt able to bathe UB and LB with increased time. Pt was seated in w/c to wash UB. Pt stood with CGA with RW/supporting UEs on counter with forward flexed trunk 2/2 increased R groin/buttocks pain as pt was able to bathe buttocks and linnette area. Pt then bathed B lower legs with crossing them while seated with increased time especially with R LE 2/2 pain. TUB/SHOWER TRANSFER INDEPENDENCE Initial Assessment   Score 0   Comments NT 2/2 pt bathe at sink this session. UPPER BODY DRESSING/UNDRESSING Initial Assessment   Functional Level 5   Items applied/Steps completed Pullover (4 steps), Bra (3 steps)   Comments Pt able to manage clothing while seated in w/c.  Pt would require assistance with obtaining clothing. LOWER BODY DRESSING/UNDRESSING Initial Assessment   Functional Level 3     Sock and/or Shoe Management FIM:3   Items applied/Steps completed Sock, right (1 step), Sock, left (1 step), Elastic waist pants (3 steps)   Comments Pt required assistance with initially diane/doffing R sock 2/2 increased R groin/buttocks pain. During second attempt, pt was able to cross R LE to diane sock with increased time. Pt also diane/doffed L sock with crossing LE with more ease. Pt also managed pants over hips while standing with CGA for balance with x1 UE support on counter/walker with forward flexed posture. Pt required assistance with pulling pants over buttocks. TOILETING Initial Assessment   Functional Level 4   Comments Pt was able to manage pants over hips with SBA-CGA for safety/balance with RW. Pt able to also manage hygiene standing. TOILET TRANSFER INDEPENDENCE Initial Assessment   Transfer score 4   Comments Pt performed stand step transfer with RW from w/c to toilet with BSC at min A for sit to stand and CGA for transfer with forward flexed posture and cues for proper hand placement. INSTRUMENTAL ADL Initial Assessment (PLOF)   Meal preparation Independent   Homemaking Independent   Medicine Management Independent   Financial Management Independent      OCCUPATIONAL THERAPY PLAN OF CARE  Areas to Assess:   Self Care/Functional transfer/IADL  [X]     NMRE/ estim  [ ]     UE Ther ex/Ther act  [X]     Cognitive Training  [ ]     Patient/Family/Caregiver Education  [X]       Order received from MD for occupational therapy services and chart reviewed. Pt to be seen 5 times per week for 3 hours of total therapy per day for 2 weeks. Thank you for the referral.     LTGs: See Care Plan     Pt would benefit from skilled occupational therapy in order to improve independent functional mobility/ADLs,/IADLs within the home.  Interventions may include range of motion (AROM, PROM B UE), motor function (B UE/ strengthening/coordination), activity tolerance (vitals, oxygen saturation levels), balance training, ADL/IADL training and functional transfer training. Please see IRC; Interdisciplinary Eval, Care Plan, and Patient Education for further information regarding occupational therapy examination and plan of care.       KRISTINA Garcia  4/7/2017

## 2017-04-07 NOTE — PROGRESS NOTES
met with patient, completed the initial Spiritual Assessment of the patient, and offered Pastoral Care, see flow sheets for interventions. Patient was in pain today. Her brother was visiting. Pastoral support provided. Patient does not have any Confucianism/cultural needs that will affect patients preferences in health care. Chart reviewed. Chaplains will continue to follow and will provide pastoral care on an as needed/as requested basis. Shan Ferreira MDiv.   Board Certified [employer] [phone]

## 2017-04-07 NOTE — REHAB NOTE
[facility]    Bedside and Verbal shift change report given to Mayela Laboy RN (oncoming nurse) by Hermelinda Chicas RN (offgoing nurse). Report included the following information SBAR, Kardex, MAR and Recent Results. Situation:   Code Status: Full Code   Reason for Admission: [facility] Day: 1   Problem List:   Hospital Problems  Date Reviewed: 4/7/2017          Codes Class Noted POA    History of Coumadin therapy ICD-10-CM: Z79.01  ICD-9-CM: V58.61  Unknown Yes    Overview Signed 4/6/2017 10:35 PM by Forrest Wu MD     Anticoagulation for chronic atrial fibrillation; Discontinued on 3/30/2017             Decreased calculated glomerular filtration rate (GFR) ICD-10-CM: R94.4  ICD-9-CM: 794.4  3/30/2017 Yes    Overview Signed 4/6/2017  5:47 PM by Forrest Wu MD     Calculated GFR equivalent to that of CKD stage 3 = 30-59 ml/min             History of pyelonephritis ICD-10-CM: Z87.440  ICD-9-CM: V13.02  3/30/2017 Yes        Iliopsoas muscle hematoma ICD-10-CM: B97.58EO  ICD-9-CM: 924.00  3/30/2017 Yes        * (Principal)Psoas hematoma, right, secondary to anticoagulant therapy ICD-10-CM: S30. 1XXA  ICD-9-CM: 924.9, E934.2  3/30/2017 Yes        Generalized weakness ICD-10-CM: R53.1  ICD-9-CM: 780.79  3/30/2017 Yes        Impaired mobility and ADLs ICD-10-CM: Z74.09  ICD-9-CM: 799.89  3/30/2017 Yes        Benign hypertensive heart disease with systolic CHF, NYHA class 2 (HCC) (Chronic) ICD-10-CM: I11.0, I50.20  ICD-9-CM: 402.11, 428.20, 428.0  9/5/2012 Yes        Type 2 diabetes mellitus with diabetic neuropathy (HCC) (Chronic) ICD-10-CM: E11.40  ICD-9-CM: 250.60, 357.2  6/28/2011 Yes              Background:   Past Medical History:   Past Medical History:   Diagnosis Date    Benign hypertensive heart disease with systolic CHF, NYHA class 2 ([address] 9/5/2012    Biventricular implantable cardioverter-defibrillator in situ 04/28/2005    Upgraded to BiV AICD; gen change 4/2008; pocket revision 10/2009; Abdominal - done on 8/22/2012 by Dr. Desmond Aguilar Cardiac cath 08/15/1996    Patent coronaries. Elev LVEDP. EF 50-55%.  Cardiac echocardiogram 06/23/2015    Ltd study. EF 45-50%. Mild, diffuse hypk. Severe apical hypk. No mass or thrombus was clearly identified, although imaging was suboptimal.      Cardiac nuclear imaging test 06/19/2015    Fixed distal apical, distal septal defect more likely due to RV pacing than prior infarct. No ischemia. EF 46%. RWMA c/w RV pacing. Nondiagnostic EKG on pharm stress test.      Cardiovascular lower extremity venous duplex 09/04/2012    Acute, non-occlusive DVT in CFV on right. No DVT on left. No superficial thrombosis bilaterally.  Cardiovascular upper extremity venous duplex 08/27/2012    DVT in axillary vein on left. Left subclavian was not visualized.     Chronic anemia 9/5/2012    Chronic systolic heart failure (HCC)     Decreased calculated glomerular filtration rate (GFR) 3/30/2017    Calculated GFR equivalent to that of CKD stage 3 = 30-59 ml/min    Diabetic neuropathy associated with type 2 diabetes mellitus ([address] 6/28/2011    Difficult airway for intubation 08/22/2012    see anesthesia airway note    Dyslipidemia 6/28/2011    Gout     History of complete heart block 6/28/2011    History of Coumadin therapy     Anticoagulation for DVT of the LUE; Discontinued on 3/30/2017    History of deep venous thrombosis 9/5/2012    Left upper extremity    History of pyelonephritis 3/30/2017    Left bundle branch block (LBBB) on electrocardiogram 6/28/2011    Nonischemic cardiomyopathy ([address] 6/28/2011    Obesity (BMI 35.0-39.9 without comorbidity) ([address] 3/13/2017    Obstructive sleep apnea on CPAP 2/7/2012    Psoas hematoma, right, secondary to anticoagulant therapy 3/30/2017    Type 2 diabetes mellitus with diabetic neuropathy ([address] 6/28/2011      Patient taking anticoagulants no    Patient has a defibrillator: yes    Assessment:   Changes in Assessment throughout shift: no     Patient has central line: no Reasons if yes: Insertion date: Last dressing date:   Patient has Krueger Cath: no Reasons if yes: Insertion date:     Last Vitals:     Vitals:    04/06/17 2202 04/07/17 0758 04/07/17 1631   BP: 152/68 159/69 121/68   Pulse: 70 70 69   Resp: 18 18 18   Temp: 98.6 °F (37 °C) 97.9 °F (36.6 °C) 98.7 °F (37.1 °C)   SpO2: 98% 96% 98%   Weight: 105.7 kg (233 lb)     Height: 5' 7\" (1.702 m)          PAIN    Pain Assessment    Pain Intensity 1: 0 (04/07/17 1851) Pain Intensity 1: 2 (12/29/14 1105)    Pain Location 1: Back, Groin Pain Location 1: Abdomen    Pain Intervention(s) 1: Medication (see MAR) Pain Intervention(s) 1: Medication (see MAR)  Patient Stated Pain Goal: 0 Patient Stated Pain Goal: 0  o Intervention effective: yes   o Other actions taken for pain: repositioning     Skin Assessment  Skin color    Condition/Temperature    Integrity    Turgor    Weekly Pressure Ulcer Documentation Weekly Pressure Ulcer Documentation: Pressure Ulcer Noted-See Wound LDA to Document  Wound Prevention & Protection Methods  Orientation of wound Orientation of Wound Prevention: Posterior  Location of Prevention Location of Wound Prevention: Sacrum/Coccyx  Dressing Present Dressing Present : No  Dressing Status    Wound Offloading Wound Offloading (Prevention Methods): Bed, pressure redistribution/air     INTAKE/OUPUT    Date 04/06/17 1900 - 04/07/17 0659 04/07/17 0700 - 04/08/17 0659   Shift 2504-6816 24 Hour Total 1891-6993 6910-0416 24 Hour Total   I  N  T  A  K  E   P.O.   716  716      P. O.   716  716    Shift Total  (mL/kg)   716  (6.8)  716  (6.8)   O  U  T  P  U  T   Urine  (mL/kg/hr)           Urine Occurrence(s) 3 x 3 x 3 x  3 x    Stool           Stool Occurrence(s) 0 x 0 x 0 x  0 x    Shift Total  (mL/kg)        NET   716  716   Weight (kg) 105.7 105.7 105.7 105.7 105.7       Recommendations:  1.  Patient needs and requests: none    2. Diet: Diabetic    3. Pending tests/procedures: am labs     4. Functional Level/Equipment: assist x 1/wheelchair    5. Estimated Discharge Date: tbd Posted on Whiteboard in Patients Room: no     [facility] Safety Check    A safety check occurred in the patient's room between off going nurse and oncoming nurse listed above. The safety check included the below items  Area Items   H  High Alert Medications - Verify all high alert medication drips (heparin, PCA, etc.)   E  Equipment - Suction is set up for ALL patients (with vania)  - Red plugs utilized for all equipment (IV pumps, etc.)  - WOWs wiped down at end of shift.  - Room stocked with oxygen, suction, and other unit-specific supplies   A  Alarms - Bed alarm is set for fall risk patients  - Ensure chair alarm is in place and activated if patient is up in a chair   L  Lines - Check IV for any infiltration  - Krueger bag is empty if patient has a Krueger   - Tubing and IV bags are labeled   S  Safety   - Room is clean, patient is clean, and equipment is clean. - Hallways are clear from equipment besides carts. - Fall bracelet on for fall risk patients  - Ensure room is clear and free of clutter  - Suction is set up for ALL patients (with vania)  - Hallways are clear from equipment besides carts.    - Isolation precautions followed, supplies available outside room, sign posted

## 2017-04-07 NOTE — PROGRESS NOTES
Problem: Mobility Impaired (Adult and Pediatric)  Goal: *Acute Goals and Plan of Care (Insert Text)  PHYSICAL THERAPY EXAMINATION  Time In: 0930  Time Out: 1100  Patient Name: Clarence Castillo  Patient Age: 77 y.o. Patient presents A/O x 4 sitting up in w/c reporting increased pain and demonstrating \"shaking\" presentation requesting opportunity for pain management; nursing notified; breakthrough medication provided. Sitting BP: 152/68 mmHg. Patient participates in functional assessment as noted below as well as additional functional challenge including therapeutic exercise as noted below as well as squat pivot training. Picking up object from floor from standing position deferred 2/2 safety and pain concerns. Patient appropriate with safety education verbalizing understanding of call bell and indications for use. Patient reports motivation for therapy stating \"I really want to get better. \" Patient offered time for questions regarding diagnosis/prognosis as it relate to physical recovery and mobility. Patient educated on the importance of muscle pump and continued mobility challenge as well as treatment techniques to improve recovery. Patient positioned sitting up in w/c following evaluation; nursing notified of patient status; call bell in reach. Past Medical History:   Past Medical History:   Diagnosis Date    Benign hypertensive heart disease with systolic CHF, NYHA class 2 ([address] 9/5/2012    Biventricular implantable cardioverter-defibrillator in situ 04/28/2005     Upgraded to BiV AICD; gen change 4/2008; pocket revision 10/2009; Abdominal - done on 8/22/2012 by Dr. Cira Mattson Cardiac cath 08/15/1996     Patent coronaries. Elev LVEDP. EF 50-55%.  Cardiac echocardiogram 06/23/2015     Ltd study. EF 45-50%. Mild, diffuse hypk. Severe apical hypk.   No mass or thrombus was clearly identified, although imaging was suboptimal.      Cardiac nuclear imaging test 06/19/2015     Fixed distal apical, distal septal defect more likely due to RV pacing than prior infarct. No ischemia. EF 46%. RWMA c/w RV pacing. Nondiagnostic EKG on pharm stress test.      Cardiovascular lower extremity venous duplex 2012     Acute, non-occlusive DVT in CFV on right. No DVT on left. No superficial thrombosis bilaterally.  Cardiovascular upper extremity venous duplex 2012     DVT in axillary vein on left. Left subclavian was not visualized.     Chronic anemia 2012    Chronic systolic heart failure (HCC)      Decreased calculated glomerular filtration rate (GFR) 3/30/2017     Calculated GFR equivalent to that of CKD stage 3 = 30-59 ml/min    Diabetic neuropathy associated with type 2 diabetes mellitus ([address] 2011    Difficult airway for intubation 2012     see anesthesia airway note    Dyslipidemia 2011    History of complete heart block 2011    History of Coumadin therapy       Anticoagulation for chronic atrial fibrillation; Discontinued on 3/30/2017    History of deep venous thrombosis 2012     Left upper extremity    History of pyelonephritis 3/30/2017    Left bundle branch block (LBBB) on electrocardiogram 2011    Nonischemic cardiomyopathy ([address] 2011    Obesity (BMI 35.0-39.9 without comorbidity) ([address] 3/13/2017    Obstructive sleep apnea on CPAP 2012    Psoas hematoma, right, secondary to anticoagulant therapy 3/30/2017    Type 2 diabetes mellitus with diabetic neuropathy ([address] 2011        Pain at start of tx:9/10  Pain at stop of tx:8/10     Patient identified with name and : YES     Medical Diagnosis:  Debility  Psoas hematoma, right, secondary to anticoagulant therapy Psoas hematoma, right, secondary to anticoagulant therapy      Therapy Diagnosis:   Difficulty with bed mobility  [X]     Difficulty with functional transfers  [X]     Difficulty with ambulation  [X]     Difficulty with stair negotiations  [X]        Problem List: Decreased strength R LE  [X]     Decreased strength trunk/core  [ ]     Decreased AROM   [X]     Decreased PROM  [X]    Decreased endurance  [X]     Decreased balance sitting  [X]     Decreased balance standing  [X]     Pain   [X]     Slow ambulation velocity  [X]    Decreased coordination  [X]    Decreased safety awareness  [ ]       Functional Limitations:   Decreased independence with bed mobility  [X]     Decreased independence with functional transfers  [X]     Decreased independence with ambulation  [X]     Decreased independence with stair negotiation  [X]        Previous Functional Level: Patient reports independence in home and mod I in community with intermittent SPC use outside of home.      Home Environment: Home Environment: Private residence  # Steps to Enter: 3  One/Two Story Residence: One story  Living Alone: No  Support Systems: Child(lea), Family member(s), Spouse/Significant Other/Partner  Patient Expects to be Discharged to[de-identified] Private residence  Current DME Used/Available at Home: Walker, rolling             Outcome Measures: FIM/MMT                 MMT Initial Assessment    Right Lower Extremity Left Lower Extremity   Hip Flexion 2+ 4-   Knee Extension 3+ 4+   Knee Flexion 3+ 4+   Ankle Dorsiflexion 4 4+   0/5       No palpable muscle contraction  1/5       Palpable muscle contraction, no joint movement  2-/5      Less than full range of motion in gravity eliminated position  2/5       Able to complete full range of motion in gravity eliminated position  2+/5     Able to initiate movement against gravity  3-/5      More than half but not full range of motion against gravity  3/5       Able to complete full range of motion against gravity  3+/5     Completes full range of motion against gravity with minimal resistance  4-/5      Completes full range of motion against gravity with minimal-moderate resistance  4/5       Completes full range of motion against gravity with moderate resistance  4+/5 Completes full range of motion against gravity with moderate-maximum resistance  5/5       Completes full range of motion against gravity with maximum resistance                 AROM: Generally decreased <50% against gravity, functional      FIM SCORES Initial Assessment   Bed/Chair/Wheelchair Transfers 1   Wheelchair Mobility 5   Walking [place] 0   Steps/Stairs 0   PRIMARY MODE OF LOCOMOTION: W/C  Please see [facility] Interdisciplinary Eval: Coordination/Balance Section for details regarding FIM score description. BED/CHAIR/WHEELCHAIR TRANSFERS Initial Assessment   Rolling Right 1 (Total assistance) (unable to assess 2/2 inability to lay supine)   Rolling Left 1 (Total assistance) (unable to assess 2/2 inability to lay supine)   Supine to Sit 1 (Total assistance) (unable to assess 2/2 inability to lay supine)   Sit to Stand Minimal assistance   Sit to Supine 1 (Total assistance) (unable to assess 2/2 inability to lay supine)   Transfer Assist Score Transfer Type: Other  Other: Patient performs stand step transfer with anterior aproach, B UE support, weight shift cues, step placement cues and controlled lowering. Patient with significant antalgia with R LE loading and forward flexed posturing requiring assist to achieve correct LE placement. Transfer Assistance : 4 (Minimal assistance)  Sit to Stand Assistance: Minimal assistance  Car Transfers: Not tested  Car Type: NA   Transfer Type Other   Comments Patient performs stand step transfer with anterior aproach, B UE support, weight shift cues, step placement cues and controlled lowering. Patient with significant antalgia with R LE loading and forward flexed posturing requiring assist to achieve correct LE placement.    Car Transfer Not tested   Car Type NA          WHEELCHAIR MOBILITY/MANAGEMENT Initial Assessment   Able to Propel 155 feet (B UE technique with SPV verbal cues for technique)   Functional Level 5   Curbs/ramps assistance required 0 (Not tested)   Wheelchair set up assistance required 3 (Moderate assistance)   Wheelchair management Manages left brake, Manages right brake (w/ extender, verbal and visual cues + min a x placement)          WALKING INDEPENDENCE Initial Assessment   Assistive device  (NA)   Ambulation assistance - level surface 0 (Not tested) (Pt unable at this time)   Distance 0 Feet (ft)   Functional Level 0 (Pt unable)   Comments Unable   Ambulation assistance - unlevel surface NA          STEPS/STAIRS Initial Assessment   Steps/Stairs ambulated Steps/Stairs Ambulated (#): 0 (Pt unable)  Level of Assist : 0 (Not tested)  Rail Use:  (NA)   Rail Use  (NA)   Functional Level 0   Comments Pt unable   Curbs/Ramps NA              PHYSICAL THERAPY PLAN OF CARE     Therapy Diagnosis:   Please see table above     Order received from MD for physical therapy services and chart reviewed. Pt to be seen 5 times per week for 3 hours of total therapy per day for 3 weeks. Thank you for the referral.     Physical Therapy Short Goals  Initiated 4/7/2017 and to be accomplished within 7 day(s)  1. Patient will move from supine to sit and sit to supine , scoot up and down and roll side to side in bed with maximal assistance. 2.  Patient will transfer from bed to chair and chair to bed with CGA using the least restrictive device. 3.  Patient will perform sit to stand with supervision/set-up. 4.  Patient will ambulate with minimal assistance/contact guard assist for 25 feet with the least restrictive device. Physical Therapy Long Goals  Initiated 4/7/2017 and to be accomplished within 21 day(s)  1. Patient will move from supine to sit and sit to supine , scoot up and down and roll side to side in bed with modified independence. 2.  Patient will transfer from bed to chair and chair to bed with Mod I using the least restrictive device. 3.  Patient will perform sit to stand with modified independence.   4.  Patient will ambulate with supervision/set-up for 150 feet with the least restrictive device. 5.  Patient will ascend/descend 3 stairs with 1 handrail(s) with minimal assistance/contact guard assist.     Pt would benefit from skilled physical therapy in order to improve independent functional mobility within the home. Interventions may include range of motion (AROM, PROM B LE/trunk), motor function (B LE/trunk strengthening/coordination), activity tolerance (vitals, oxygen saturation levels), bed mobility training, balance activities, gait training (progressive ambulation program), and functional transfer training. Please see [facility]; Interdisciplinary Eval, Care Plan, and Patient Education for further information regarding physical therapy examination and plan of care. Balaji Velasquez, PT DPT  4/7/2017

## 2017-04-07 NOTE — ROUTINE PROCESS
0800 Pt., awake sitting up in bed no change in assessment no signs of distress pt. Reported to be feeling fine. 0930 Pt. oob in chair eating breakfast no complaints. 1200 Pt. Tolerated Physical  Therapy well. 1330 Pt. Able to transfer by wheelchair with no difficutly. 1500 no change in assessment pt. Reported to be feeling fine. 1800 Pt. Sitting up in chair eating dinner.

## 2017-04-07 NOTE — H&P
[facility] PHYSICAL [address], [address]     INPATIENT REHABILITATION  HISTORY AND PHYSICAL  (Post Admission Physician Evaluation)    Name: Kalin Boyer CSN: [id number]   Age: 77 y.o. MRN: [id number]   Sex: female Admit Date: 4/6/2017     PCP: Dr. Jose A Carbajal      Primary Rehab Impairment Category (GRADY): Miscellaneous    Impairment Group Label: Debility    Etiologic Diagnosis: Right iliopsoas muscle hematoma, secondary to supratherapeutic INR/anticoagulant therapy      Subjective:     Patient seen and examined. History of the Present Illness: The patient is a 59-year-old [facility] female with multiple medical comorbidities who was admitted to [facility] on 3/30/2017 due to left hip pain. The patient was apparently well until two weeks prior to admission, the patient had persistent right flank pain. The patient was brought to the [facility] Emergency Department for further evaluation. The patient was seen and examined by Dr. Kevin Rosales. The right flank pain was attributed to the recently diagnosed pyelonephritis. The patient was discharged to home with Ciprofloxacin and Percocet. The pain had improved but 2 days prior to admission, the patient fell and she landed on her right hip. Due to the persistence of the right hip pain, the patient was brought back to the [facility] Emergency Department for further evaluation. WBC count was 19.0. CT scan of the abdomen and pelvis (3/30/2017) showed an asymmetrically enlarged right iliopsoas muscle compared to the left extending to the right groin region with evidence of intramuscular hematoma in different stage; this is new compared to the recent CT 3/16/17; mild fatty stranding noted in the right pericolonic gutter and the right groin; no additional hematoma or other significant injury seen.  The patient was admitted under the service of the [facility] (Dr. Sylvie Cowden) with an admitting impression of SIRS with iliopsoas muscle hematoma. Coumadin was put on hold. Patient was given Phytonadione SC and 2 units FFP. Urine culture done 3/14/2017 yielded growth of >100,000 colonies/ml of Enterobacter aerogenes. Patient had completed a treatment course of oral Ciprofloxacin prescribed by Dr. Adolfo Nielson. Urinalysis done 3/30/2017 showed nitrites negative, leukocyte esterase negative, WBC 0 to 3, bacteria few. Aztreonam was given for treatment of possible partially treated pyelonephritis. Cardiology consult (Dr. Konstantin Peck) was called for evaluation and comanagement. Interventional Radiology consult (Dr. Chela Tejada) was called for possible drainage of the right iliopsoas abscess. Dr. Estrella Grimm stated he didn't believe the patient would not benefit from draining unless there are signs of superinfection. Urine culture yielded no growth after 2 days. Aztreonam was given for 5 days. Urology consult (Dr. Andria Beyer) was called for evaluation and comanagement. Patient was noted to have urinary retention so an indwelling perez catheter was placed. CT scan of the abdomen and pelvis (4/02/2017) showed redemonstration of prominent hematoma within the right psoas muscle, essentially unchanged as compared to previous study on 3/13/2017; around right psoas muscle there are are some hazy densities, indicating mild reactive or congestive changes similar to previous study; otherwise, there is no definable confluent inflammatory phlegmon around right psoas muscle; there is no evidence of hematoma in left psoas muscle or in rest of the retroperitoneum. Cardiology (Dr. Konstantin Peck) recommended to NOT restart anticoagulation. The patient had remained hemodynamically stable but due to the above events, the patient was noted to be generally weak and with impaired mobility and ADLs.  Patient was felt to be a good candidate for acute inpatient rehabilitation. Upon evaluation by Physical Therapy and Occupational Therapy, the patient was recommended for acute inpatient rehabilitation. The patient was discharged and was subsequently admitted to the [facility] for intensive rehabilitation to help recover strength, function and mobility. Past Medical History:  Past Medical History:   Diagnosis Date    Benign hypertensive heart disease with systolic CHF, NYHA class 2 ([facility] [address] 9/5/2012    Biventricular implantable cardioverter-defibrillator in situ 04/28/2005    Upgraded to BiV AICD; gen change 4/2008; pocket revision 10/2009; Abdominal - done on 8/22/2012 by Dr. Cira Mattson Cardiac cath 08/15/1996    Patent coronaries. Elev LVEDP. EF 50-55%.  Cardiac echocardiogram 06/23/2015    Ltd study. EF 45-50%. Mild, diffuse hypk. Severe apical hypk. No mass or thrombus was clearly identified, although imaging was suboptimal.      Cardiac nuclear imaging test 06/19/2015    Fixed distal apical, distal septal defect more likely due to RV pacing than prior infarct. No ischemia. EF 46%. RWMA c/w RV pacing. Nondiagnostic EKG on pharm stress test.      Cardiovascular lower extremity venous duplex 09/04/2012    Acute, non-occlusive DVT in CFV on right. No DVT on left. No superficial thrombosis bilaterally.  Cardiovascular upper extremity venous duplex 08/27/2012    DVT in axillary vein on left. Left subclavian was not visualized.     Chronic anemia 9/5/2012    Chronic systolic heart failure (HCC)     Decreased calculated glomerular filtration rate (GFR) 3/30/2017    Calculated GFR equivalent to that of CKD stage 3 = 30-59 ml/min    Diabetic neuropathy associated with type 2 diabetes mellitus ([facility] [address] 6/28/2011    Difficult airway for intubation 08/22/2012    see anesthesia airway note    Dyslipidemia 6/28/2011    Gout     History of complete heart block 6/28/2011    History of Coumadin therapy Anticoagulation for DVT of the LUE; Discontinued on 3/30/2017    History of deep venous thrombosis 9/5/2012    Left upper extremity    History of pyelonephritis 3/30/2017    Left bundle branch block (LBBB) on electrocardiogram 6/28/2011    Nonischemic cardiomyopathy ([facility] [address] 6/28/2011    Obesity (BMI 35.0-39.9 without comorbidity) ([facility] [address] 3/13/2017    Obstructive sleep apnea on CPAP 2/7/2012    Psoas hematoma, right, secondary to anticoagulant therapy 3/30/2017    Type 2 diabetes mellitus with diabetic neuropathy ([facility] [address] 6/28/2011       Past Surgical History:  Past Surgical History:   Procedure Laterality Date    HX CARPAL TUNNEL RELEASE  4/07    right     HX CHOLECYSTECTOMY  1994    HX HYSTERECTOMY  1973    HX OTHER SURGICAL  6/11/2012    AICD revision    HX PACEMAKER  4/28/2005    [employer] AICD       Allergies: Allergies   Allergen Reactions    Vancomycin Itching    Ampicillin Itching    Bactrim [Sulfamethoxazole-Trimethoprim] Unknown (comments)    Blueberry Swelling     Causes throat swelling    Ciprofloxacin Itching    Codeine Other (comments)     Jumpy feeling    Crestor [Rosuvastatin] Itching    Darvocet A500 [Propoxyphene N-Acetaminophen] Itching    Demerol [Meperidine] Itching    Levaquin [Levofloxacin] Itching    Lipitor [Atorvastatin] Myalgia    Magnesium Oxide Itching     nausea    Minocin [Minocycline] Unknown (comments)    Pcn [Penicillins] Itching    Pravachol [Pravastatin] Swelling     Swelling in mouth     Sulfa (Sulfonamide Antibiotics) Itching    Ultracet [Tramadol-Acetaminophen] Itching    Vicodin [Hydrocodone-Acetaminophen] Unknown (comments)    Vytorin 10-10 [Ezetimibe-Simvastatin] Myalgia    Percodan [Oxycodone Hcl-Oxycodone-Asa] Itching       Social History: The patient is , lives with her  in a 1-story house with a 3-step entry in [place], [place]. She denies any tobacco, alcohol or illicit drug use. She is on disability. Family History:  Mother is alive. Father is . Family History   Problem Relation Age of Onset    Cancer Father      Leukemia       Transfer Medications (from the transfer summary prepared by Dr. Krysten Butt):    Prior to Admission Medications   Prescriptions Last Dose Informant Patient Reported? Taking? Cholecalciferol, Vitamin D3, (VITAMIN D) 5,000 unit Tab   Yes No   Sig: Take 1 Tab by mouth daily. OTHER   No No   Sig: Check CBC, CMP, Mg in 3 days   allopurinol (ZYLOPRIM) 100 mg tablet   Yes No   Sig: Take 100 mg by mouth two (2) times a day. bisacodyl (DULCOLAX) 10 mg suppository   No No   Sig: Insert 10 mg into rectum daily as needed. carvedilol (COREG) 12.5 mg tablet   No No   Sig: Take 1 Tab by mouth two (2) times a day. docusate sodium (COLACE) 100 mg capsule   No No   Sig: Take 1 Cap by mouth two (2) times a day. ferrous sulfate 325 mg (65 mg iron) tablet   No No   Sig: Take 1 Tab by mouth two (2) times daily (with meals). folic acid (FOLVITE) 1 mg tablet   No No   Sig: Take 1 Tab by mouth daily. gabapentin (NEURONTIN) 300 mg tablet   Yes No   Sig: Take 300 mg by mouth two (2) times daily as needed. insulin aspart (NOVOLOG) 100 unit/mL injection   No No   Sig: INITIATE INSULIN CORRECTIVE PROTOCOL (HR): Normal Insulin Sensitivity  For Blood Sugar (mg/dL) of:    Less than 150 =   0 units          150 -199 =   2 units 200 -249 =   4 units 250 -299 =   6 units 300 -349 =   8 units 350 and above =   10 units If 2 glucose readings are above 200 mg/dL   insulin glargine (LANTUS) 100 unit/mL injection   No No   Si Units by SubCUTAneous route two (2) times a day. oxyCODONE-acetaminophen (PERCOCET 7.5) 7.5-325 mg per tablet   No No   Sig: Take 1 Tab by mouth every six (6) hours as needed. Max Daily Amount: 4 Tabs. polyethylene glycol (MIRALAX) 17 gram packet   No No   Sig: Take 1 Packet by mouth daily. pravastatin (PRAVACHOL) 40 mg tablet   Yes No   Sig: Take 40 mg by mouth nightly. Facility-Administered Medications: None       Review Of Systems:   CONSTITUTIONAL: No weight loss. EYES: No blurred vision and no eye discharge. ENT: No nasal discharge. No ear pain. CARDIOVASCULAR: No chest pain and no diaphoresis. RESPIRATORY: No cough, no hemoptysis. GI: No vomiting, no diarrhea   : No urinary frequency and no dysuria. MUSCULOSKELETAL: No muscle pains. SKIN: No rashes. NEURO: No dizziness, no numbness. ENDOCRINE: No polyphagia and no polydipsia. HEMATOLOGY: As above. Objective:     Vital Signs:  Patient Vitals for the past 24 hrs:   BP Temp Pulse Resp SpO2 Height Weight   04/07/17 0758 159/69 97.9 °F (36.6 °C) 70 18 96 % - -   04/06/17 2202 152/68 98.6 °F (37 °C) 70 18 98 % 5' 7\" (1.702 m) 105.7 kg (233 lb)        Body mass index is 36.49 kg/(m^2). Physical Examination:  GENERAL SURVEY: Patient is awake, alert, oriented x 3, sitting comfortably on the chair, not in acute respiratory distress. HEENT: pink palpebral conjunctivae, anicteric sclerae, no nasoaural discharge, moist oral mucosa  NECK: supple, no jugular venous distention, no palpable lymph nodes  CHEST/LUNGS: symmetrical chest expansion, good air entry, clear breath sounds  HEART: adynamic precordium, good S1 S2, no S3, regular rhythm, no murmurs  ABDOMEN: obese, bowel sounds appreciated, soft, non-tender  EXTREMITIES: pink nailbeds, no edema, full and equal pulses, no calf tenderness   NEUROLOGICAL EXAM: The patient is awake, alert and oriented x3, able to answer questions fairly appropriately, able to follow 1 and 2 step commands. Able to tell time from the wall clock. Cranial nerves II-XII are grossly intact. No gross sensory deficit. Motor strength is 4/5 on BUE, 4-/5 on the left hip, 4+/5 on the left knee and left ankle, 2+/5 on the right hip (due to pain), 3+/5 on the right knee (due to right hip pain), 4/5 on the right ankle.       Current Medications:  Current Facility-Administered Medications Medication Dose Route Frequency    acetaminophen (TYLENOL) tablet 650 mg  650 mg Oral Q4H PRN    docusate sodium (COLACE) capsule 100 mg  100 mg Oral BID    bisacodyl (DULCOLAX) tablet 10 mg  10 mg Oral Q48H PRN    insulin lispro (HUMALOG) injection   SubCUTAneous TIDAC    allopurinol (ZYLOPRIM) tablet 100 mg  100 mg Oral BID    carvedilol (COREG) tablet 12.5 mg  12.5 mg Oral BID WITH MEALS    folic acid (FOLVITE) tablet 1 mg  1 mg Oral DAILY    gabapentin (NEURONTIN) capsule 300 mg  300 mg Oral BID    insulin glargine (LANTUS) injection 24 Units  24 Units SubCUTAneous BID    polyethylene glycol (MIRALAX) packet 17 g  17 g Oral DAILY    oxyCODONE-acetaminophen (PERCOCET 7.5) 7.5-325 mg per tablet 1 Tab  1 Tab Oral Q6H PRN    glucose chewable tablet 16 g  4 Tab Oral PRN    glucagon (GLUCAGEN) injection 1 mg  1 mg IntraMUSCular PRN    dextrose (D50W) injection syrg 12.5-25 g  25-50 mL IntraVENous PRN    Lactobacillus Acidoph & Bulgar (FLORANEX) tablet 2 Tab  2 Tab Oral BID       Functional Assessment:     Occupational Therapy   Prior Level of Function  Pre-Admission Screen  Post-Admission Evaluation   Eating   Independent Eating   Independent Eating  Functional Level: 7   Grooming   Independent Grooming   Supervision Grooming  Functional Level: 5   Upper Body Dressing   Independent Upper Body Dressing   Supervision Upper Body Dressing  Functional Level: 5   Lower Body Dressing   Independent Lower Body Dressing   Moderate Assist Lower Body Dressing  Functional Level: 3   Bladder Management   Independent Bladder Management   Moderate Assist Toileting  Functional Level: 4   Bowel Management   Independent Bowel Management   Moderate Assist      Physical Therapy   Prior Level of Function  Pre-Admission Screen  Post-Admission Evaluation   Ambulation   Independent Ambulation   Minimal Assist Gait  Amount of Assistance: 0 (Not tested) (Pt unable at this time)  Distance (ft): 0 Feet (ft)  Assistive Device: (NA)   Bed Mobility   Independent Bed Mobility   Minimal Assist Bed/Mat Mobility  Rolling Right : 1 (Total assistance) (unable to assess 2/2 inability to lay supine)  Rolling Left : 1 (Total assistance) (unable to assess 2/2 inability to lay supine)  Supine to Sit : 1 (Total assistance) (unable to assess 2/2 inability to lay supine)  Sit to Supine : 1 (Total assistance) (unable to assess 2/2 inability to lay supine)   Supine to Sit   Independent Supine to Sit   Minimal Assist Bed/Mat Mobility  Rolling Right : 1 (Total assistance) (unable to assess 2/2 inability to lay supine)  Rolling Left : 1 (Total assistance) (unable to assess 2/2 inability to lay supine)  Supine to Sit : 1 (Total assistance) (unable to assess 2/2 inability to lay supine)  Sit to Supine : 1 (Total assistance) (unable to assess 2/2 inability to lay supine)   Sit to Stand   Independent Sit to Stand   Minimal Assist Bed/Mat Mobility  Rolling Right : 1 (Total assistance) (unable to assess 2/2 inability to lay supine)  Rolling Left : 1 (Total assistance) (unable to assess 2/2 inability to lay supine)  Supine to Sit : 1 (Total assistance) (unable to assess 2/2 inability to lay supine)  Sit to Supine : 1 (Total assistance) (unable to assess 2/2 inability to lay supine)   Bed/Chair Transfers   Independent Bed/Chair Transfers   Minimal Assist Transfers  Transfer Type: Other  Other: Patient performs stand step transfer with anterior aproach, B UE support, weight shift cues, step placement cues and controlled lowering. Patient with significant antalgia with R LE loading and forward flexed posturing requiring assist to achieve correct LE placement.   Transfer Assistance : 4 (Minimal assistance)  Sit to Stand Assistance: Minimal assistance  Car Transfers: Not tested  Car Type: NA   Toilet Transfers   Independent Toilet Transfers   Minimal Assist Toilet Transfers  Toilet Transfer Score: 4     Speech and Language Pathology  Post-Admission Evaluation Comprehension (Native Language)  Primary Mode of Comprehension: Auditory  Score: 6     Expression (Native Language)  Primary Mode of Expression: Verbal  Score: 6     Social Interaction/Pragmatics  Score: 6     Problem Solving  Score: 6     Memory  Score: 6       Legend:   7 - Independent   6 - Modified Independent   5 - Standby Assistance / Supervision / Set-up   4 - Minimum Assistance / Contact Guard Assistance   3 - Moderate Assistance   2 - Maximum Assistance   1 - Total Assistance / Dependent       Labs on Admission:  Recent Results (from the past 24 hour(s))   GLUCOSE, POC    Collection Time: 04/06/17  4:10 PM   Result Value Ref Range    Glucose (POC) 103 70 - 110 mg/dL   GLUCOSE, POC    Collection Time: 04/06/17  8:19 PM   Result Value Ref Range    Glucose (POC) 144 (H) 70 - 110 mg/dL   GLUCOSE, POC    Collection Time: 04/06/17 10:35 PM   Result Value Ref Range    Glucose (POC) 83 70 - 110 mg/dL   CBC WITH AUTOMATED DIFF    Collection Time: 04/07/17  6:16 AM   Result Value Ref Range    WBC 9.0 4.6 - 13.2 K/uL    RBC 3.33 (L) 4.20 - 5.30 M/uL    HGB 8.8 (L) 12.0 - 16.0 g/dL    HCT 26.5 (L) 35.0 - 45.0 %    MCV 79.6 74.0 - 97.0 FL    MCH 26.4 24.0 - 34.0 PG    MCHC 33.2 31.0 - 37.0 g/dL    RDW 14.8 (H) 11.6 - 14.5 %    PLATELET 533 054 - 526 K/uL    MPV 9.5 9.2 - 11.8 FL    NEUTROPHILS 69 40 - 73 %    LYMPHOCYTES 22 21 - 52 %    MONOCYTES 7 3 - 10 %    EOSINOPHILS 2 0 - 5 %    BASOPHILS 0 0 - 2 %    ABS. NEUTROPHILS 6.2 1.8 - 8.0 K/UL    ABS. LYMPHOCYTES 2.0 0.9 - 3.6 K/UL    ABS. MONOCYTES 0.6 0.05 - 1.2 K/UL    ABS. EOSINOPHILS 0.2 0.0 - 0.4 K/UL    ABS.  BASOPHILS 0.0 0.0 - 0.1 K/UL    DF AUTOMATED     MAGNESIUM    Collection Time: 04/07/17  6:16 AM   Result Value Ref Range    Magnesium 2.2 1.6 - 2.6 mg/dL   METABOLIC PANEL, BASIC    Collection Time: 04/07/17  6:16 AM   Result Value Ref Range    Sodium 137 136 - 145 mmol/L    Potassium 4.1 3.5 - 5.5 mmol/L    Chloride 100 100 - 108 mmol/L    CO2 30 21 - 32 mmol/L    Anion gap 7 3.0 - 18 mmol/L    Glucose 65 (L) 74 - 99 mg/dL    BUN 19 (H) 7.0 - 18 MG/DL    Creatinine 1.22 0.6 - 1.3 MG/DL    BUN/Creatinine ratio 16 12 - 20      GFR est AA 53 (L) >60 ml/min/1.73m2    GFR est non-AA 44 (L) >60 ml/min/1.73m2    Calcium 8.4 (L) 8.5 - 10.1 MG/DL   VITAMIN D, 25 HYDROXY    Collection Time: 04/07/17  6:16 AM   Result Value Ref Range    Vitamin D 25-Hydroxy 67.0 30 - 100 ng/mL   URIC ACID    Collection Time: 04/07/17  6:16 AM   Result Value Ref Range    Uric acid 3.8 2.6 - 7.2 MG/DL   GLUCOSE, POC    Collection Time: 04/07/17  8:03 AM   Result Value Ref Range    Glucose (POC) 65 (L) 70 - 110 mg/dL   GLUCOSE, POC    Collection Time: 04/07/17  8:18 AM   Result Value Ref Range    Glucose (POC) 68 (L) 70 - 110 mg/dL   GLUCOSE, POC    Collection Time: 04/07/17  9:19 AM   Result Value Ref Range    Glucose (POC) 118 (H) 70 - 110 mg/dL   GLUCOSE, POC    Collection Time: 04/07/17 12:20 PM   Result Value Ref Range    Glucose (POC) 101 70 - 110 mg/dL       Estimated Glomerular Filtration Rate:  CKD-EPI:   On admission, estimated GFR was 53.5 mL/min/1.73m2 based on a Creatinine of 1.22 mg/dl. MDRD:   On admission, estimated GFR was 56.7 mL/min/1.73m2 based on a Creatinine of 1.22 mg/dl. Assessment:     Primary Rehabilitation Diagnosis  1. Impaired Mobility and ADLs  2.  Right iliopsoas muscle hematoma, secondary to supratherapeutic INR/anticoagulant therapy    Comorbidities   Nonischemic cardiomyopathy    History of complete heart block    Biventricular implantable cardioverter-defibrillator in situ    Left bundle branch block (LBBB) on electrocardiogram    Type 2 diabetes mellitus with diabetic neuropathy    Dyslipidemia    Diabetic neuropathy associated with type 2 diabetes mellitus    Obstructive sleep apnea on CPAP    History of AICD generator infection     Difficult airway for intubation    Benign hypertensive heart disease with systolic CHF, NYHA class 2     Decreased calculated glomerular filtration rate (GFR)    Chronic anemia    History of deep venous thrombosis    Anticoagulated on Coumadin    Pacemaker twiddler's syndrome    Chronic systolic heart failure     Obesity (BMI 35.0-39.9 without comorbidity)     History of pyelonephritis    History of Coumadin therapy    Gout        Willingness to participate in the program: Good      Rehabilitation Potential: Good      Plan:     1. Medical Issues being followed closely:    [x]  Fall and safety precautions     []  Wound Care     [x]  Bowel and Bladder Function     [x]  Fluid Electrolyte and Nutrition Balance     [x]  Pain Control      2. Issues that 24 hour rehabilitation nursing is following:    [x]  Fall and safety precautions     []  Wound Care     [x]  Bowel and Bladder Function     [x]  Fluid Electrolyte and Nutrition Balance     [x]  Pain Control      [x]  Assistance with and education on in-room safety with transfers to and from the bed, wheelchair, toilet and shower. 3. Acute rehabilitation plan of care:    [x]  Patient to be evaluated and treated by:           [x]  Physical Therapy           [x]  Occupational Therapy           []  Speech Therapy     []  Hold Rehab until further notice     5. Medications:    [x]  MAR Reviewed     [x]  Continue Present Medications     6. DVT Prophylaxis:      []  Lovenox     []  Unfractionated Heparin     []  Coumadin     []  NOAC     [x]  MIMI Stockings     [x]  Sequential Compression Device     []  None     7. Rehabilitation program and expectations from patient, as well as medical issues discussed with the patient. REHABILITATION PLAN:    1. The patient is being admitted to a comprehensive acute inpatient rehabilitation program consisting of at least 180 minutes a day, 5 out of 7 days a week of  combined physical and occupational therapy, and close supervision by a physician with special training and experience in rehabilitation medicine.     2. The patient's prognosis for significant practical improvement within a reasonable period of time appears to be good. 3. The estimated length of stay is 12 days. 4. The patient/family has a good understanding of our discharge process. The patient has potential to make improvement and is in need of at least two of the following multidisciplinary therapies including but not limited to physical, occupational and nutritional services. The patient is expected to be able to return to home with outpatient therapy and family support. 5. Given the patient's multiple co-morbidities and risk for further medical complications, rehabilitation services could not be safely provided at a lower level of care such as a skilled nursing facility. 6. Physical therapy for therapeutic exercise, progressive mobility, gait training, transfer training, bed mobility training, patient and family education, and wheelchair mobility training. Physical therapy goals to address  extremity function, range of motion, balance, safety awareness, independence in transfers, activity tolerance, independence in bed mobility, and independence in ambulation. 7. Occupational therapy for self-care home management, transfer training, therapeutic exercise, activity, wheelchair mobility training. Occupational therapy goals to address  extremity function, cognition, balance, activity tolerance, independence in functional transfers, range of motion, safety awareness, independence in ADL  and independence in home management skills. 8. Specialized 24 hour rehabilitation nursing care for bowel and bladder retraining, disease management, pain management, pressure ulcer prevention and management per policy, education on pressure relief techniques, embolism prevention, nutrition management, hydration management, transfer training and   medication distribution.     9. Nutrition and Dietary services will be obtained for assessment of adequate calorie needs, hydration and calorie counts as appropriate. 10. Therapeutic recreation for leisure skills. 6. Rehab psychology for coping skills. 12. Social work services for patient and family counseling and safe discharge planning. 13. I will be in charge of the inpatient rehab program. Full details of the inpatient rehab program will be outlined in the initial team conference. REHABILITATION GOALS:  Improve functional and activities of daily living skills in order to return back to independent living with family support. MEDICAL PLAN:  > Right iliopsoas muscle hematoma, secondary to supratherapeutic INR/anticoagulant therapy   > CT scan of the abdomen and pelvis (3/30/2017) showed an asymmetrically enlarged right iliopsoas muscle compared to the left extending to the right groin region with evidence of intramuscular hematoma in different stage; this is new compared to the recent CT 3/16/17; mild fatty stranding noted in the right pericolonic gutter and the right groin; no additional hematoma or other significant injury seen. > CT scan of the abdomen and pelvis (4/02/2017) showed redemonstration of prominent hematoma within the right psoas muscle, essentially unchanged as compared to previous study on 3/13/2017; around right psoas muscle  there are are some hazy densities, indicating mild reactive or congestive changes similar to previous study; otherwise, there is no definable confluent inflammatory phlegmon around right psoas muscle; there is no evidence of hematoma in left psoas muscle or in rest of the retroperitoneum.    > Coumadin had been discontinued on 3/30/2017   > INR (4/6/2017) = 1.3    > Benign hypertensive heart disease with chronic systolic heart failure   > Carvedilol 12.5 mg PO BID with meals (8AM, 5PM)    > History of pyelonephritis   > Patient had completed a 5-day treatment course of Aztreonam during his stay at [facility]    > Floranex 2 tabs PO BID    > Type 2 diabetes mellitus with diabetic neuropathy   > Decrease Lantus from 24 units SC BID to 25 units SC q HS   > Humalog insulin sliding scale SC TID AC only    > Gout / Hyperuricemia    > Uric acid (4/7/2017) = 3.8   > Prior to admission to [facility], the patient was on Allopurinol 100 mg PO BID   > Decrease Allopurinol from 100 mg PO BID to 100 mg PO once daily    > Constipation   > Decrease Docusate sodium to 100 mg PO once daily after breakfast   > Pericolace 2 tabs PO once daily after dinner   > Polyethylene glycol 17 grams in 8 oz water PO once daily q 7PM     > Analgesia   > Acetaminophen 650 mg PO q 4 hr PRN for pain level less than 5/10   > Gabapentin 300 mg PO BID   > Percocet 5/325 1 tab PO TID (8AM, 12PM, 4PM)   > Percocet 5/325 1 tab PO q 4 hr PRN for pain level greater than 4/10 (from 8PM to 4AM only)      PRECAUTIONS:   1. Safety/fall precautions. 2. Deep venous thrombosis precautions. POTENTIAL BARRIERS TO DISCHARGE: Risk for falls. RELEVANT CHANGES SINCE PREADMISSION SCREENING: I have compared the patients medical and functional status at the time of the pre-admission screening and on this post-admission evaluation. The preadmission screen and findings from therapy evaluations both support my post admission physician evaluation, deeming this patient to be an appropriate candidate for the IRF. The patient requires multidisciplinary treatment, physician oversight and intensive therapy not provided at a lower level of care. By signing this document, I acknowledge that I have personally performed a full physical examination on this patient within 24 hours of admission to this inpatient rehabilitation facility and have determined the patient to be able to tolerate the above course of treatment at an intensive level for a reasonable period of time.  I will be completing a detailed individualized plan of care for this patient by day #4 of the patients stay based upon the Pre-Admission Screen, the Post-Admission Evaluation, and the therapy evaluations.       Signed:    Shaylee Simmons MD    April 7, 2017

## 2017-04-07 NOTE — ROUTINE PROCESS
TRANSFER - OUT REPORT:    Verbal report given to [address] [place] [address] (name) on Sandra Michael  being transferred to inpatient rehab(unit) for routine progression of care       Report consisted of patients Situation, Background, Assessment and   Recommendations(SBAR). Information from the following report(s) SBAR, Kardex, [facility] and Recent Results was reviewed with the receiving nurse. Lines:       Opportunity for questions and clarification was provided.       Patient transported with:   [employer]

## 2017-04-07 NOTE — PROGRESS NOTES
Pt is a 77year old female admitted to ARU for psoas hematoma, right, secondary to anticoagulant therapy. Pt is alert and oriented, alone in the room. Pt reports having pain and just taken pain medication. Pt consents to continue the conversation. Pt states that she lives with her spouse in a 1 level home with 3 steps to enter with bilateral rails and a alk in shower. Pt states that she was able to self care prior to admission and was not using DME. Pt states that she used home health 4 years ago, outpatient therapy over 10 years ago and denies history in SNF. Pt states that her spouse, Erlin Vidales ([phone]) is her NOK contact. Pt reports that her spouse works during the day but is able to take time off at the time of dc if she will need additional assistance. Pt confirms her insurance as [employer]. Sw reviewed dc planning, insurance updates and team conference. Pt states understanding. Sw offered any assistance and pt declines stating she will rest. Sw will follow.

## 2017-04-07 NOTE — PROGRESS NOTES
Four eye skin assessment completed by Mary Carlson RN and DANIEL Mancia. Pt's skin intact except for under pt's right great toe that has dry an peeling skin. No drainage noted. Pt stated it was from ulcer she had a while back.

## 2017-04-07 NOTE — REHAB NOTE
[facility]    Bedside and Verbal shift change report given to Edyordy Fernando RN (oncoming nurse) by Mag Francisco RN (offgoing nurse). Report included the following information SBAR, Kardex, MAR and Recent Results. Situation:   Code Status: Full Code   Reason for Admission: [facility] Day: 1   Problem List:   Hospital Problems  Date Reviewed: 3/30/2017          Codes Class Noted POA    History of Coumadin therapy ICD-10-CM: Z79.01  ICD-9-CM: V58.61  Unknown Yes    Overview Signed 4/6/2017 10:35 PM by Matthew Waite MD     Anticoagulation for chronic atrial fibrillation; Discontinued on 3/30/2017             Decreased calculated glomerular filtration rate (GFR) ICD-10-CM: R94.4  ICD-9-CM: 794.4  3/30/2017 Yes    Overview Signed 4/6/2017  5:47 PM by Matthew Waite MD     Calculated GFR equivalent to that of CKD stage 3 = 30-59 ml/min             History of pyelonephritis ICD-10-CM: Z87.440  ICD-9-CM: V13.02  3/30/2017 Yes        Iliopsoas muscle hematoma ICD-10-CM: W14.32VH  ICD-9-CM: 924.00  3/30/2017 Yes        * (Principal)Psoas hematoma, right, secondary to anticoagulant therapy ICD-10-CM: S30. 1XXA  ICD-9-CM: 924.9, E934.2  3/30/2017 Yes        Generalized weakness ICD-10-CM: R53.1  ICD-9-CM: 780.79  3/30/2017 Yes        Impaired mobility and ADLs ICD-10-CM: Z74.09  ICD-9-CM: 799.89  3/30/2017 Yes        Benign hypertensive heart disease with systolic CHF, NYHA class 2 (HCC) (Chronic) ICD-10-CM: I11.0, I50.20  ICD-9-CM: 402.11, 428.20, 428.0  9/5/2012 Yes        Type 2 diabetes mellitus with diabetic neuropathy (HCC) (Chronic) ICD-10-CM: E11.40  ICD-9-CM: 250.60, 357.2  6/28/2011 Yes              Background:   Past Medical History:   Past Medical History:   Diagnosis Date    Benign hypertensive heart disease with systolic CHF, NYHA class 2 ([address] 9/5/2012    Biventricular implantable cardioverter-defibrillator in situ 04/28/2005    Upgraded to BiV AICD; gen change 4/2008; pocket revision 10/2009; Abdominal - done on 8/22/2012 by Dr. Michael Mcclure Cardiac cath 08/15/1996    Patent coronaries. Elev LVEDP. EF 50-55%.  Cardiac echocardiogram 06/23/2015    Ltd study. EF 45-50%. Mild, diffuse hypk. Severe apical hypk. No mass or thrombus was clearly identified, although imaging was suboptimal.      Cardiac nuclear imaging test 06/19/2015    Fixed distal apical, distal septal defect more likely due to RV pacing than prior infarct. No ischemia. EF 46%. RWMA c/w RV pacing. Nondiagnostic EKG on pharm stress test.      Cardiovascular lower extremity venous duplex 09/04/2012    Acute, non-occlusive DVT in CFV on right. No DVT on left. No superficial thrombosis bilaterally.  Cardiovascular upper extremity venous duplex 08/27/2012    DVT in axillary vein on left. Left subclavian was not visualized.     Chronic anemia 9/5/2012    Chronic systolic heart failure (HCC)     Decreased calculated glomerular filtration rate (GFR) 3/30/2017    Calculated GFR equivalent to that of CKD stage 3 = 30-59 ml/min    Diabetic neuropathy associated with type 2 diabetes mellitus ([address] 6/28/2011    Difficult airway for intubation 08/22/2012    see anesthesia airway note    Dyslipidemia 6/28/2011    History of complete heart block 6/28/2011    History of Coumadin therapy     Anticoagulation for chronic atrial fibrillation; Discontinued on 3/30/2017    History of deep venous thrombosis 9/5/2012    Left upper extremity    History of pyelonephritis 3/30/2017    Left bundle branch block (LBBB) on electrocardiogram 6/28/2011    Nonischemic cardiomyopathy ([address] 6/28/2011    Obesity (BMI 35.0-39.9 without comorbidity) ([address] 3/13/2017    Obstructive sleep apnea on CPAP 2/7/2012    Psoas hematoma, right, secondary to anticoagulant therapy 3/30/2017    Type 2 diabetes mellitus with diabetic neuropathy ([address] 6/28/2011      Patient taking anticoagulants no    Patient has a defibrillator: yes    Assessment:   Changes in Assessment throughout shift: no     Patient has central line: no Reasons if yes: Insertion date: Last dressing date:   Patient has Krueger Cath: no Reasons if yes: Insertion date:     Last Vitals:     Vitals:    04/06/17 2202   BP: 152/68   Pulse: 70   Resp: 18   Temp: 98.6 °F (37 °C)   SpO2: 98%   Weight: 105.7 kg (233 lb)   Height: 5' 7\" (1.702 m)        PAIN    Pain Assessment    Pain Intensity 1: 0 (04/07/17 0400) Pain Intensity 1: 2 (12/29/14 1105)    Pain Location 1: Groin Pain Location 1: Abdomen    Pain Intervention(s) 1: Medication (see MAR) Pain Intervention(s) 1: Medication (see MAR)  Patient Stated Pain Goal: 0 Patient Stated Pain Goal: 0  o Intervention effective: yes   o Other actions taken for pain: repositioning     Skin Assessment  Skin color    Condition/Temperature    Integrity    Turgor    Weekly Pressure Ulcer Documentation Weekly Pressure Ulcer Documentation: Pressure Ulcer Noted-See Wound LDA to Document  Wound Prevention & Protection Methods  Orientation of wound Orientation of Wound Prevention: Posterior  Location of Prevention Location of Wound Prevention: Sacrum/Coccyx  Dressing Present Dressing Present : No  Dressing Status    Wound Offloading Wound Offloading (Prevention Methods): Bed, pressure redistribution/air     INTAKE/OUPUT    Date 04/06/17 0700 - 04/07/17 0659 04/07/17 0700 - 04/08/17 0659   Shift 9367-1575 4568-0492 24 Hour Total 6753-3184 0385-0225 24 Hour Total   I  N  T  A  K  E   Shift Total  (mL/kg)         O  U  T  P  U  T   Urine            Urine Occurrence(s)  1 x 1 x       Stool            Stool Occurrence(s)  0 x 0 x       Shift Total  (mL/kg)         NET         Weight (kg)  105.7 105.7 105.7 105.7 105.7       Recommendations:  1. Patient needs and requests: none    2. Diet: Diabetic    3. Pending tests/procedures: am labs     4. Functional Level/Equipment: assist x 1/wheelchair    5.  Estimated Discharge Date: tbd Posted on Whiteboard in [facility]: no     HEALS Safety Check    A safety check occurred in the patient's room between off going nurse and oncoming nurse listed above. The safety check included the below items  Area Items   H  High Alert Medications - Verify all high alert medication drips (heparin, PCA, etc.)   E  Equipment - Suction is set up for ALL patients (with vania)  - Red plugs utilized for all equipment (IV pumps, etc.)  - WOWs wiped down at end of shift.  - Room stocked with oxygen, suction, and other unit-specific supplies   A  Alarms - Bed alarm is set for fall risk patients  - Ensure chair alarm is in place and activated if patient is up in a chair   L  Lines - Check IV for any infiltration  - Krueger bag is empty if patient has a Krueger   - Tubing and IV bags are labeled   S  Safety   - Room is clean, patient is clean, and equipment is clean. - Hallways are clear from equipment besides carts. - Fall bracelet on for fall risk patients  - Ensure room is clear and free of clutter  - Suction is set up for ALL patients (with vania)  - Hallways are clear from equipment besides carts.    - Isolation precautions followed, supplies available outside room, sign posted

## 2017-04-08 LAB
GLUCOSE BLD STRIP.AUTO-MCNC: 102 MG/DL (ref 70–110)
GLUCOSE BLD STRIP.AUTO-MCNC: 109 MG/DL (ref 70–110)
GLUCOSE BLD STRIP.AUTO-MCNC: 109 MG/DL (ref 70–110)
GLUCOSE BLD STRIP.AUTO-MCNC: 121 MG/DL (ref 70–110)

## 2017-04-08 RX ADMIN — LACTOBACILLUS TAB 2 TABLET: TAB at 08:22

## 2017-04-08 RX ADMIN — INSULIN GLARGINE 25 UNITS: 100 INJECTION, SOLUTION SUBCUTANEOUS at 20:52

## 2017-04-08 RX ADMIN — DOCUSATE SODIUM 100 MG: 100 CAPSULE, LIQUID FILLED ORAL at 08:21

## 2017-04-08 RX ADMIN — OXYCODONE HYDROCHLORIDE AND ACETAMINOPHEN 1 TABLET: 5; 325 TABLET ORAL at 11:38

## 2017-04-08 RX ADMIN — OXYCODONE HYDROCHLORIDE AND ACETAMINOPHEN 1 TABLET: 5; 325 TABLET ORAL at 08:22

## 2017-04-08 RX ADMIN — FOLIC ACID 1 MG: 1 TABLET ORAL at 08:21

## 2017-04-08 RX ADMIN — BISACODYL 10 MG: 5 TABLET, COATED ORAL at 08:29

## 2017-04-08 RX ADMIN — GABAPENTIN 300 MG: 300 CAPSULE ORAL at 08:22

## 2017-04-08 RX ADMIN — OXYCODONE HYDROCHLORIDE AND ACETAMINOPHEN 1 TABLET: 5; 325 TABLET ORAL at 17:13

## 2017-04-08 RX ADMIN — LACTOBACILLUS TAB 2 TABLET: TAB at 17:13

## 2017-04-08 RX ADMIN — OXYCODONE HYDROCHLORIDE AND ACETAMINOPHEN 1 TABLET: 5; 325 TABLET ORAL at 14:16

## 2017-04-08 RX ADMIN — STANDARDIZED SENNA CONCENTRATE AND DOCUSATE SODIUM 2 TABLET: 8.6; 5 TABLET, FILM COATED ORAL at 17:13

## 2017-04-08 RX ADMIN — GABAPENTIN 300 MG: 300 CAPSULE ORAL at 17:13

## 2017-04-08 RX ADMIN — ALLOPURINOL 100 MG: 100 TABLET ORAL at 08:22

## 2017-04-08 RX ADMIN — OXYCODONE HYDROCHLORIDE AND ACETAMINOPHEN 1 TABLET: 5; 325 TABLET ORAL at 20:57

## 2017-04-08 RX ADMIN — OXYCODONE HYDROCHLORIDE AND ACETAMINOPHEN 1 TABLET: 5; 325 TABLET ORAL at 03:18

## 2017-04-08 NOTE — PROGRESS NOTES
Progress Note    Patient: Caitlyn Estrada MRN: [id number]  SSN: [id number]    YOB: 1950  Age: 77 y.o. Sex: female      Admit Date: 4/6/2017    LOS: 2 days     Subjective:     Patient with diabetes admitted due to psoas hematoma. Having some discomfort. Objective:     Vitals:    04/07/17 0758 04/07/17 1631 04/07/17 2000 04/08/17 0715   BP: 159/69 121/68 107/60 111/50   Pulse: 70 69 79 76   Resp: 18 18 20 20   Temp: 97.9 °F (36.6 °C) 98.7 °F (37.1 °C) 98.4 °F (36.9 °C) 97.2 °F (36.2 °C)   SpO2: 96% 98%  98%   Weight:       Height:            Intake and Output:  Current Shift: 04/08 0701 - 04/08 1900  In: 240 [P.O.:240]  Out: -   Last three shifts: 04/06 1901 - 04/08 0700  In: 716 [P.O.:716]  Out: -     Physical Exam:   GENERAL: alert, cooperative, mild distress, appears stated age  LUNG: clear to auscultation bilaterally  HEART: regular rate and rhythm, S1, S2 normal, no murmur, click, rub or gallop    Lab/Data Review: All lab results for the last 24 hours reviewed. Glucose 102    Assessment:     Principal Problem:    Psoas hematoma, right, secondary to anticoagulant therapy (3/30/2017)    Active Problems:    Type 2 diabetes mellitus with diabetic neuropathy ([facility] (6/28/2011)      Benign hypertensive heart disease with systolic CHF, NYHA class 2 ([facility] (9/5/2012)      Decreased calculated glomerular filtration rate (GFR) (3/30/2017)      Overview: Calculated GFR equivalent to that of CKD stage 3 = 30-59 ml/min      History of pyelonephritis (3/30/2017)      Iliopsoas muscle hematoma (3/30/2017)      Generalized weakness (3/30/2017)      Impaired mobility and ADLs (3/30/2017)      History of Coumadin therapy ()      Overview: Anticoagulation for chronic atrial fibrillation; Discontinued on 3/30/2017        Plan:     Continue present management.     Signed By: Marcio Pereira MD     April 8, 2017

## 2017-04-08 NOTE — REHAB NOTE
[facility] PHYSICAL REHABILITATION  [address] [place] [address], [facility]  OVER[facility]    Name: Nidia Mendez CSN: [id number]   Age: 77 y.o. MRN: [id number]   Sex: female Admit Date: 4/6/2017     Primary Rehabilitation Diagnosis  1. Impaired Mobility and ADLs  2. Right iliopsoas muscle hematoma, secondary to supratherapeutic INR/anticoagulant therapy     Comorbidities   Nonischemic cardiomyopathy    History of complete heart block    Biventricular implantable cardioverter-defibrillator in situ    Left bundle branch block (LBBB) on electrocardiogram    Type 2 diabetes mellitus with diabetic neuropathy    Dyslipidemia    Diabetic neuropathy associated with type 2 diabetes mellitus    Obstructive sleep apnea on CPAP    History of AICD generator infection     Difficult airway for intubation    Benign hypertensive heart disease with systolic CHF, NYHA class 2     Decreased calculated glomerular filtration rate (GFR)    Chronic anemia    History of deep venous thrombosis    Anticoagulated on Coumadin    Pacemaker twiddler's syndrome    Chronic systolic heart failure     Obesity (BMI 35.0-39.9 without comorbidity)     History of pyelonephritis    History of Coumadin therapy    Gout       ANTICIPATED INTERVENTIONS THAT SUPPORT THE MEDICAL NECESSITY OF THIS ADMISSION:    I. Physical Therapy              A. Intensity: 1.5 hour per day              B. Frequency: 5 times per week              C. Duration: 3 weeks              D. Long Term Goals:    1. Patient will move from supine to sit and sit to supine , scoot up and down and roll side to side in bed with modified independence. 2. Patient will transfer from bed to chair and chair to bed with Mod I using the least restrictive device. 3. Patient will perform sit to stand with modified independence. 4. Patient will ambulate with supervision/set-up for 150 feet with the least restrictive device. 5. Patient will ascend/descend 3 stairs with 1 handrail(s) with minimal assistance/contact guard assist.   E. Interventions: Interventions may include range of motion (AROM, PROM B LE/trunk), motor function (B LE/trunk strengthening/coordination), activity tolerance (vitals, oxygen saturation levels), bed mobility training, balance activities, gait training (progressive ambulation program), and functional transfer training. II. Occupational Therapy  21 . Intensity: 1.5 hour per day              B. Frequency: 5 times per week              C. Duration: 2 weeks              D. Long Term Goals:    1. Pt will perform grooming with independence. 2. Pt will perform UB bathing with modified independence. 3. Pt will perform LB bathing with modified independence. 4. Pt will perform shower transfer with modified independence. 5. Pt will perform UB dressing with independence. 6. Pt will perform LB dressing with modified independence. 7. Pt will perform toileting task with modified independence. 8. Pt will perform toilet transfer with modified independence. 9. Pt will perform an IADL task while standing with modified independence. E. Interventions: Interventions may include range of motion (AROM, PROM B UE), motor function (B UE/ strengthening/coordination), activity tolerance (vitals, oxygen saturation levels), balance training, ADL/IADL training and functional transfer training. PHYSICIAN'S ASSESSMENT OF FINDINGS:    Are the established goals sufficient for achieving the optimal level of function? [x]  Yes      []  No    What changes would you recommend to the goals as written? None      Are the interventions noted sufficient for achieving the optimal level of function? [x]  Yes      []  No    What changes would you recommend to the interventions noted?  If therapy staff is unable to provide 3 hr of total therapy per day in 5 days due to medical issues or decreased patient tolerance, may modify treatment schedule to 15 hr/week.       Estimated length of stay: 2 weeks      Medical rehabilitation prognosis:    []  Excellent     [x]  Good     []  Fair     []  Guarded       Discharge Destination:     [x]  Home     []  [address]     []  [facility] Dixon     []  Poon[facility] Barber     []  [place]     []  Other:       Signed:    Alisia Spence MD    April 8, 2017

## 2017-04-08 NOTE — ROUTINE PROCESS
[facility]    Bedside and Verbal shift change report given to Maria Guadalupe Hastings RN (oncoming nurse) by Ponce Linn RN   (offgoing nurse). Report included the following information SBAR, Kardex, MAR and Recent Results. Situation:   Code Status: Full Code   Reason for Admission: Psoas hematoma  Hospital Day: 2   Problem List:   Hospital Problems  Date Reviewed: 4/7/2017          Codes Class Noted POA    History of Coumadin therapy ICD-10-CM: Z79.01  ICD-9-CM: V58.61  Unknown Yes    Overview Signed 4/6/2017 10:35 PM by Forrest Wu MD     Anticoagulation for chronic atrial fibrillation; Discontinued on 3/30/2017             Decreased calculated glomerular filtration rate (GFR) ICD-10-CM: R94.4  ICD-9-CM: 794.4  3/30/2017 Yes    Overview Signed 4/6/2017  5:47 PM by Forrest Wu MD     Calculated GFR equivalent to that of CKD stage 3 = 30-59 ml/min             History of pyelonephritis ICD-10-CM: Z87.440  ICD-9-CM: V13.02  3/30/2017 Yes        Iliopsoas muscle hematoma ICD-10-CM: H70.00TE  ICD-9-CM: 924.00  3/30/2017 Yes        * (Principal)Psoas hematoma, right, secondary to anticoagulant therapy ICD-10-CM: S30. 1XXA  ICD-9-CM: 924.9, E934.2  3/30/2017 Yes        Generalized weakness ICD-10-CM: R53.1  ICD-9-CM: 780.79  3/30/2017 Yes        Impaired mobility and ADLs ICD-10-CM: Z74.09  ICD-9-CM: 799.89  3/30/2017 Yes        Benign hypertensive heart disease with systolic CHF, NYHA class 2 (HCC) (Chronic) ICD-10-CM: I11.0, I50.20  ICD-9-CM: 402.11, 428.20, 428.0  9/5/2012 Yes        Type 2 diabetes mellitus with diabetic neuropathy (HCC) (Chronic) ICD-10-CM: E11.40  ICD-9-CM: 250.60, 357.2  6/28/2011 Yes              Background:   Past Medical History:   Past Medical History:   Diagnosis Date    Benign hypertensive heart disease with systolic CHF, NYHA class 2 ([address] 9/5/2012    Biventricular implantable cardioverter-defibrillator in situ 04/28/2005    Upgraded to BiV AICD; gen change 4/2008; pocket revision 10/2009; Abdominal - done on 8/22/2012 by Dr. Micha Jacobson Cardiac cath 08/15/1996    Patent coronaries. Elev LVEDP. EF 50-55%.  Cardiac echocardiogram 06/23/2015    Ltd study. EF 45-50%. Mild, diffuse hypk. Severe apical hypk. No mass or thrombus was clearly identified, although imaging was suboptimal.      Cardiac nuclear imaging test 06/19/2015    Fixed distal apical, distal septal defect more likely due to RV pacing than prior infarct. No ischemia. EF 46%. RWMA c/w RV pacing. Nondiagnostic EKG on pharm stress test.      Cardiovascular lower extremity venous duplex 09/04/2012    Acute, non-occlusive DVT in CFV on right. No DVT on left. No superficial thrombosis bilaterally.  Cardiovascular upper extremity venous duplex 08/27/2012    DVT in axillary vein on left. Left subclavian was not visualized.     Chronic anemia 9/5/2012    Chronic systolic heart failure (HCC)     Decreased calculated glomerular filtration rate (GFR) 3/30/2017    Calculated GFR equivalent to that of CKD stage 3 = 30-59 ml/min    Diabetic neuropathy associated with type 2 diabetes mellitus ([address] 6/28/2011    Difficult airway for intubation 08/22/2012    see anesthesia airway note    Dyslipidemia 6/28/2011    Gout     History of complete heart block 6/28/2011    History of Coumadin therapy     Anticoagulation for DVT of the LUE; Discontinued on 3/30/2017    History of deep venous thrombosis 9/5/2012    Left upper extremity    History of pyelonephritis 3/30/2017    Left bundle branch block (LBBB) on electrocardiogram 6/28/2011    Nonischemic cardiomyopathy ([address] 6/28/2011    Obesity (BMI 35.0-39.9 without comorbidity) ([address] 3/13/2017    Obstructive sleep apnea on CPAP 2/7/2012    Psoas hematoma, right, secondary to anticoagulant therapy 3/30/2017    Type 2 diabetes mellitus with diabetic neuropathy ([address] 6/28/2011      Patient taking anticoagulants no    Patient has a defibrillator: yes Assessment:   Changes in Assessment throughout shift: no     Patient has central line: no Reasons if yes: Insertion date: Last dressing date:   Patient has Krueger Cath: no Reasons if yes: Insertion date:     Last Vitals:     Vitals:    04/07/17 0758 04/07/17 1631 04/07/17 2000 04/08/17 0715   BP: 159/69 121/68 107/60 111/50   Pulse: 70 69 79 76   Resp: 18 18 20 20   Temp: 97.9 °F (36.6 °C) 98.7 °F (37.1 °C) 98.4 °F (36.9 °C) 97.2 °F (36.2 °C)   SpO2: 96% 98%  98%   Weight:       Height:            PAIN    Pain Assessment    Pain Intensity 1: 8 (04/08/17 0800) Pain Intensity 1: 2 (12/29/14 1105)    Pain Location 1: Back, Groin Pain Location 1: Abdomen    Pain Intervention(s) 1: Medication (see MAR) Pain Intervention(s) 1: Medication (see MAR)  Patient Stated Pain Goal: 0 Patient Stated Pain Goal: 0  o Intervention effective: no    o Other actions taken for pain: pain medicine     Skin Assessment  Skin color Skin Color: Appropriate for ethnicity  Condition/Temperature Skin Condition/Temp: Warm  Integrity Skin Integrity: Intact  Turgor    Weekly Pressure Ulcer Documentation Weekly Pressure Ulcer Documentation: No Pressure Ulcer Noted-Pressure Ulcer Prevention Initiated  Wound Prevention & Protection Methods  Orientation of wound Orientation of Wound Prevention: Posterior  Location of Prevention Location of Wound Prevention: Sacrum/Coccyx  Dressing Present Dressing Present : No  Dressing Status    Wound Offloading Wound Offloading (Prevention Methods): Bed, pressure redistribution/air, Chair cushion, Pillows, Wheelchair     INTAKE/OUPUT    Date 04/07/17 0700 - 04/08/17 0659 04/08/17 0700 - 04/09/17 0659   Shift [id number] 1900-0659 24 Hour Total 0700-1859 1900-0659 24 Hour Total   I  N  T  A  K  E   P.O. 716  716         P. O. 716  716       Shift Total  (mL/kg) 716  (6.8)  716  (6.8)      O  U  T  P  U  T   Urine  (mL/kg/hr)            Urine Occurrence(s) 3 x 3 x 6 x 0 x  0 x    Stool            Stool Occurrence(s) 0 x 0 x 0 x 0 x  0 x    Shift Total  (mL/kg)          841      Weight (kg) 105.7 105.7 105.7 105.7 105.7 105.7       Recommendations:  1. Patient needs and requests: pain medicine    2. Diet: diabetic    3. Pending tests/procedures: no     4. Functional Level/Equipment: 1 x person assist    5. Estimated Discharge Date: TDB Posted on Whiteboard in Patients Room: [facility] Safety Check    A safety check occurred in the patient's room between off going nurse and oncoming nurse listed above. The safety check included the below items  Area Items   H  High Alert Medications - Verify all high alert medication drips (heparin, PCA, etc.)   E  Equipment - Suction is set up for ALL patients (with vania)  - Red plugs utilized for all equipment (IV pumps, etc.)  - WOWs wiped down at end of shift.  - Room stocked with oxygen, suction, and other unit-specific supplies   A  Alarms - Bed alarm is set for fall risk patients  - Ensure chair alarm is in place and activated if patient is up in a chair   L  Lines - Check IV for any infiltration  - Krueger bag is empty if patient has a Krueger   - Tubing and IV bags are labeled   S  Safety   - Room is clean, patient is clean, and equipment is clean. - Hallways are clear from equipment besides carts. - Fall bracelet on for fall risk patients  - Ensure room is clear and free of clutter  - Suction is set up for ALL patients (with vania)  - Hallways are clear from equipment besides carts.    - Isolation precautions followed, supplies available outside room, sign posted

## 2017-04-08 NOTE — PROGRESS NOTES
Pt blood sugar 239 states that she took her miralax in grape juice. Family teaching provided. Daughter to bring sugarless drink (crystal light) for pt. Pt states she does not like to drink plain water with her laxatives.

## 2017-04-08 NOTE — REHAB NOTE
SHIFT CHANGE NOTE FOR [facility]    Bedside and Verbal shift change report given to Adrianna Jean Baptiste RN (oncoming nurse) by Karan Owen RN (offgoing nurse). Report included the following information SBAR, Kardex, MAR and Recent Results. Situation:   Code Status: Full Code   Reason for Admission: [facility] Day: 2   Problem List:   Hospital Problems  Date Reviewed: 4/7/2017          Codes Class Noted POA    History of Coumadin therapy ICD-10-CM: Z79.01  ICD-9-CM: V58.61  Unknown Yes    Overview Signed 4/6/2017 10:35 PM by Miki Herrera MD     Anticoagulation for chronic atrial fibrillation; Discontinued on 3/30/2017             Decreased calculated glomerular filtration rate (GFR) ICD-10-CM: R94.4  ICD-9-CM: 794.4  3/30/2017 Yes    Overview Signed 4/6/2017  5:47 PM by Miki Herrera MD     Calculated GFR equivalent to that of CKD stage 3 = 30-59 ml/min             History of pyelonephritis ICD-10-CM: Z87.440  ICD-9-CM: V13.02  3/30/2017 Yes        Iliopsoas muscle hematoma ICD-10-CM: I70.17GG  ICD-9-CM: 924.00  3/30/2017 Yes        * (Principal)Psoas hematoma, right, secondary to anticoagulant therapy ICD-10-CM: S30. 1XXA  ICD-9-CM: 924.9, E934.2  3/30/2017 Yes        Generalized weakness ICD-10-CM: R53.1  ICD-9-CM: 780.79  3/30/2017 Yes        Impaired mobility and ADLs ICD-10-CM: Z74.09  ICD-9-CM: 799.89  3/30/2017 Yes        Benign hypertensive heart disease with systolic CHF, NYHA class 2 (HCC) (Chronic) ICD-10-CM: I11.0, I50.20  ICD-9-CM: 402.11, 428.20, 428.0  9/5/2012 Yes        Type 2 diabetes mellitus with diabetic neuropathy (HCC) (Chronic) ICD-10-CM: E11.40  ICD-9-CM: 250.60, 357.2  6/28/2011 Yes              Background:   Past Medical History:   Past Medical History:   Diagnosis Date    Benign hypertensive heart disease with systolic CHF, NYHA class 2 ([address] 9/5/2012    Biventricular implantable cardioverter-defibrillator in situ 04/28/2005    Upgraded to BiV AICD; gen change 4/2008; pocket revision 10/2009; Abdominal - done on 8/22/2012 by Dr. Manjinder Silva Cardiac cath 08/15/1996    Patent coronaries. Elev LVEDP. EF 50-55%.  Cardiac echocardiogram 06/23/2015    Ltd study. EF 45-50%. Mild, diffuse hypk. Severe apical hypk. No mass or thrombus was clearly identified, although imaging was suboptimal.      Cardiac nuclear imaging test 06/19/2015    Fixed distal apical, distal septal defect more likely due to RV pacing than prior infarct. No ischemia. EF 46%. RWMA c/w RV pacing. Nondiagnostic EKG on pharm stress test.      Cardiovascular lower extremity venous duplex 09/04/2012    Acute, non-occlusive DVT in CFV on right. No DVT on left. No superficial thrombosis bilaterally.  Cardiovascular upper extremity venous duplex 08/27/2012    DVT in axillary vein on left. Left subclavian was not visualized.     Chronic anemia 9/5/2012    Chronic systolic heart failure (HCC)     Decreased calculated glomerular filtration rate (GFR) 3/30/2017    Calculated GFR equivalent to that of CKD stage 3 = 30-59 ml/min    Diabetic neuropathy associated with type 2 diabetes mellitus ([address] 6/28/2011    Difficult airway for intubation 08/22/2012    see anesthesia airway note    Dyslipidemia 6/28/2011    Gout     History of complete heart block 6/28/2011    History of Coumadin therapy     Anticoagulation for DVT of the LUE; Discontinued on 3/30/2017    History of deep venous thrombosis 9/5/2012    Left upper extremity    History of pyelonephritis 3/30/2017    Left bundle branch block (LBBB) on electrocardiogram 6/28/2011    Nonischemic cardiomyopathy ([address] 6/28/2011    Obesity (BMI 35.0-39.9 without comorbidity) ([address] 3/13/2017    Obstructive sleep apnea on CPAP 2/7/2012    Psoas hematoma, right, secondary to anticoagulant therapy 3/30/2017    Type 2 diabetes mellitus with diabetic neuropathy ([address] 6/28/2011      Patient taking anticoagulants no    Patient has a defibrillator: yes    Assessment:   Changes in Assessment throughout shift: no     Patient has central line: no Reasons if yes: Insertion date: Last dressing date:   Patient has Krueger Cath: no Reasons if yes: Insertion date:     Last Vitals:     Vitals:    04/07/17 0758 04/07/17 1631 04/07/17 2000 04/08/17 0715   BP: 159/69 121/68 107/60 111/50   Pulse: 70 69 79 76   Resp: 18 18 20 20   Temp: 97.9 °F (36.6 °C) 98.7 °F (37.1 °C) 98.4 °F (36.9 °C) 97.2 °F (36.2 °C)   SpO2: 96% 98%  98%   Weight:       Height:            PAIN    Pain Assessment    Pain Intensity 1: 0 (04/08/17 0605) Pain Intensity 1: 2 (12/29/14 1105)    Pain Location 1: Back, Groin Pain Location 1: Abdomen    Pain Intervention(s) 1: Medication (see MAR) Pain Intervention(s) 1: Medication (see MAR)  Patient Stated Pain Goal: 0 Patient Stated Pain Goal: 0  o Intervention effective: yes   o Other actions taken for pain: repositioning     Skin Assessment  Skin color Skin Color: Appropriate for ethnicity  Condition/Temperature Skin Condition/Temp: Warm  Integrity Skin Integrity: Intact  Turgor    Weekly Pressure Ulcer Documentation Weekly Pressure Ulcer Documentation:  (see wound care notes)  Wound Prevention & Protection Methods  Orientation of wound Orientation of Wound Prevention: Posterior  Location of Prevention Location of Wound Prevention: Sacrum/Coccyx  Dressing Present Dressing Present : No  Dressing Status    Wound Offloading Wound Offloading (Prevention Methods): Bed, pressure redistribution/air     INTAKE/OUPUT    Date 04/07/17 0700 - 04/08/17 0659 04/08/17 0700 - 04/09/17 0659   Shift 6934-6056 2722-0091 24 Hour Total 0299-8087 2681-6058 24 Hour Total   I  N  T  A  K  E   P.O. 716  716         P. O. 716  716       Shift Total  (mL/kg) 716  (6.8)  716  (6.8)      O  U  T  P  U  T   Urine  (mL/kg/hr)            Urine Occurrence(s) 3 x 3 x 6 x 0 x  0 x    Stool            Stool Occurrence(s) 0 x 0 x 0 x 0 x  0 x    Shift Total  (mL/kg)         NET 716  716      Weight (kg) 105.7 105.7 105.7 105.7 105.7 105.7       Recommendations:  1. Patient needs and requests: none    2. Diet: Diabetic    3. Pending tests/procedures: am labs     4. Functional Level/Equipment: assist x 1/wheelchair    5. Estimated Discharge Date: tbd Posted on Whiteboard in Patients Room: [facility] Safety Check    A safety check occurred in the patient's room between off going nurse and oncoming nurse listed above. The safety check included the below items  Area Items   H  High Alert Medications - Verify all high alert medication drips (heparin, PCA, etc.)   E  Equipment - Suction is set up for ALL patients (with vania)  - Red plugs utilized for all equipment (IV pumps, etc.)  - WOWs wiped down at end of shift.  - Room stocked with oxygen, suction, and other unit-specific supplies   A  Alarms - Bed alarm is set for fall risk patients  - Ensure chair alarm is in place and activated if patient is up in a chair   L  Lines - Check IV for any infiltration  - Krueger bag is empty if patient has a Krueger   - Tubing and IV bags are labeled   S  Safety   - Room is clean, patient is clean, and equipment is clean. - Hallways are clear from equipment besides carts. - Fall bracelet on for fall risk patients  - Ensure room is clear and free of clutter  - Suction is set up for ALL patients (with vania)  - Hallways are clear from equipment besides carts.    - Isolation precautions followed, supplies available outside room, sign posted

## 2017-04-08 NOTE — PROGRESS NOTES
Problem: Mobility Impaired (Adult and Pediatric)  Goal: *Acute Goals and Plan of Care (Insert Text)  Physical Therapy Short Goals  Initiated 4/7/2017 and to be accomplished within 7 day(s)  1. Patient will move from supine to sit and sit to supine , scoot up and down and roll side to side in bed with maximal assistance. 2. Patient will transfer from bed to chair and chair to bed with CGA using the least restrictive device. 3. Patient will perform sit to stand with supervision/set-up. 4. Patient will ambulate with minimal assistance/contact guard assist for 25 feet with the least restrictive device.       Physical Therapy Long Goals  Initiated 4/7/2017 and to be accomplished within 21 day(s)  1. Patient will move from supine to sit and sit to supine , scoot up and down and roll side to side in bed with modified independence. 2. Patient will transfer from bed to chair and chair to bed with Mod I using the least restrictive device. 3. Patient will perform sit to stand with modified independence. 4. Patient will ambulate with supervision/set-up for 150 feet with the least restrictive device. 5. Patient will ascend/descend 3 stairs with 1 handrail(s) with minimal assistance/contact guard assist.  PHYSICAL THERAPY DAILY NOTE  Patient Name:Debby Gonzalez  Time In: 1130  Time Out: 2889  Patient Seen For: Patient education; Therapeutic exercise;Transfer training; Wheelchair mobility  Diagnosis: Debility  Psoas hematoma, right, secondary to anticoagulant therapy Psoas hematoma, right, secondary to anticoagulant therapy  Precautions: Falls risk     Subjective: Patient reports she is feeling terrible; frequently tearful during treatment session due to significant pain complaints in right hip \"all around\" - patient educated on importance of activity and role of PT - agreeable and reported she would try her best during treatment     Pain at start of tx:8/10 in right hip - pain medication given by nursing prior to treatment Pain at stop of tx: no number given; reports pain in right hip. Given rest breaks as needed during treatment to alleviate pain. Patient identified with name and :Yes         Objective: FIM      GROSS ASSESSMENT Daily Assessment      AROM grossly decreased and non functional in R hip: AROM grossly - 35 to 90. BED/MAT MOBILITY Daily Assessment      Unable to fully assess due to patient refusal from reports of pain; Patient attempted sit to supine on inclined surface however she reported too much pain and sat back up; used upper extremities to mobilize right lower extremity on and off mat table. Min A from therapist to mobilize right lower extremity on to mat table; able to self assist when putting right lower extremity off table. TRANSFERS Daily Assessment      Sit to Stand Assistance: Minimal assistance for support; verbal cues for upper extremity positioning - attempted stands at mat table without upper extremity support and patient was unable to fully achieve standing, posterior leaned on mat table, and did not extend hips. Max cues for hip extension and putting weight through right lower extremity however patient did not demonstrate; 2 more trials in parallel bars - able to stand with support from upper extremities and weight shift to left; cues to weight shift to right lower extremity and extend knee and hip however patient was only able to minimal demonstrate increased right hip extension by grossly 5 degrees; patient did not extend knee or bear weight through right lower extremity. Patient also presented with forward head and forward flexed trunk     Stand to sit: min A for controlling descent - patient given max cues for upper extremity placement and slowing movement for safety, however patient was not able to demonstrate decreased speed. Transfer Type:  Other  Other: stand step anterior approach   Transfer Assistance : 4 (Minimal assistance) for support anterior approach - patient did not fully stand - only about 50% due to decreased hip extension; cues for step clearance to correct shuffling feet and pivoting. Cues to slow speed for safety; patient used bilateral upper extremity support on seat surfaces. BALANCE Daily Assessment     Sitting - Static: Good (unsupported) - supervision, no sway  Sitting - Dynamic: Good (unsupported) - supervision, no sway with head turns  Standing - Static: Poor; required upper extremity support and min A progressed to contact guard for safety. Minimal to no right lower extremity weight bearing from pain tolerance. Max verbal and manual cues to promote trunk, hip and knee extension however patient did not demonstrate. Two trials of ~ 2 minutes. Standing - Dynamic : Impaired - not fully assessed today due to difficulty with static standing - but would require upper extremity support and assistance from therapist.           Susan Gregory Daily Assessment     Able to Propel (ft): 300 feet  Functional Level: 5 initially - then mod Independent back to room. Propels with bilateral upper extremities. Curbs/Ramps Assist Required (FIM Score): 1 (Total assistance) - NT  Wheelchair Setup Assist Required : 5 (Supervision/setup) managing leg rests. Wheelchair Management: Manages left brake;Manages right brake                 Assessment: Patient is limited in treatment primarily to pain tolerance, patient became very tearful during session when in pain. Patient did not tolerate bed mobility and had difficulty with transfers; ambulation was not assessed secondary to safety concerns. Patient deficits include decreased lower extremity strength especially the right side due to need for min A from therapist and self assistance from upper extremities in order to mobilize right lower extremity on to mat table during attempted bed mobility.  Patient also presents with decreased activity tolerance making it difficult for patient to tolerate standing for longer than 2 minutes. However, patient has made progress today demonstrated by ability to  parallel bars for two trails for ~ 2 minutes. Patient is also presenting with decreased active range of motion of right hip primarily but also left hip which is limiting function and safety in standing despite max cues from therapist. Patient will benefit from continued PT in order to address impairments and maximize function. Plan of Care: Continue plan of care. Progress with bed mobility as tolerated on inclined surface for comfort but also to promote increased hip extension; progress standing activity in parallel bars to promote increased right lower extremity weight bearing and bilateral hip extension in order to strengthen lower extremities and maximize safety with functional mobility.       Anne Emanuel, SPT  4/8/2017

## 2017-04-08 NOTE — PROGRESS NOTES
Problem: Self Care Deficits Care Plan (Adult)  Goal: *Therapy Goal (Edit Goal, Insert Text)  Long Term Goals (to be met upon discharge date) in order to increase pts functional independence and safety, and decrease burden of care:  1. Pt will perform grooming with independence. 2. Pt will perform UB bathing with modified independence. 3. Pt will perform LB bathing with modified independence. 4. Pt will perform shower transfer with modified independence. 5. Pt will perform UB dressing with independence. 6. Pt will perform LB dressing with modified independence. 7. Pt will perform toileting task with modified independence. 8. Pt will perform toilet transfer with modified independence. 9. Pt will perform an IADL task while standing with modified independence. Short Term Weekly Goals for (17 to 17) in order to increase pts functional independence and safety, and decrease burden of care:  1. Pt will perform grooming with independence. 2. Pt will perform UB bathing with modified independence. 3. Pt will perform LB bathing with supervision. 4. Pt will perform shower transfer with supervision. 5. Pt will perform UB dressing with independence. 6. Pt will perform LB dressing with supervision. 7. Pt will perform toileting task with supervision. 8. Pt will perform toilet transfer with supervision. OCCUPATIONAL THERAPY DAILY NOTE  Patient Name:Debby Gonzalez  Time Spent With Patient  Time In: 0900  Time Out: 1000  Patient Seen For[de-identified] Other (see progress notes)     Medical Diagnosis:  Debility  Psoas hematoma, right, secondary to anticoagulant therapy Psoas hematoma, right, secondary to anticoagulant therapy      Pain at start of tx: 8/10 * patient received pain medication prior to OT session. Pain at stop of tx:8/10     Patient identified with name and : Yes  Subjective: \"I couldn't eat all my breakfast, I didn't have an appetite. My leg is hurting really bad. I think I overdid it yesterday. \" Objective:      THERAPEUTIC ACTIVITY Daily Assessment   Activity tolerance tasks: Clothespins on clothespin tree 8' seated using right dominant UE. Requires 1 rest break for 2'. THERAPEUTIC EXERCISE Daily Assessment   Power  UE bike 8' with 1 rest break for 2 minutes. Proximal UE strengthening with 2# dowel for rows, shoulder flex/ext, and elbow flex/ext for 2 sets of 10 reps     Distal UE strengthening with 2# weight for supination/pronation, wrist flex/ext for 2 sets of 10 reps.  strengthening with red theraputty for 20 repetitions. Requires 1 rest break during UE bike. Assessment: Requires few rest breaks during activity tolerance/endurance tasks. Motivated to participate and complete OT despite high pain rating. Plan of Care: Continue OT per POC. Kristina Kenny, LUCI  4/8/2017

## 2017-04-09 LAB
GLUCOSE BLD STRIP.AUTO-MCNC: 103 MG/DL (ref 70–110)
GLUCOSE BLD STRIP.AUTO-MCNC: 112 MG/DL (ref 70–110)
GLUCOSE BLD STRIP.AUTO-MCNC: 116 MG/DL (ref 70–110)
GLUCOSE BLD STRIP.AUTO-MCNC: 125 MG/DL (ref 70–110)

## 2017-04-09 RX ADMIN — LACTOBACILLUS TAB 2 TABLET: TAB at 08:12

## 2017-04-09 RX ADMIN — OXYCODONE HYDROCHLORIDE AND ACETAMINOPHEN 1 TABLET: 5; 325 TABLET ORAL at 17:21

## 2017-04-09 RX ADMIN — OXYCODONE HYDROCHLORIDE AND ACETAMINOPHEN 1 TABLET: 5; 325 TABLET ORAL at 08:12

## 2017-04-09 RX ADMIN — GABAPENTIN 300 MG: 300 CAPSULE ORAL at 09:00

## 2017-04-09 RX ADMIN — INSULIN GLARGINE 25 UNITS: 100 INJECTION, SOLUTION SUBCUTANEOUS at 20:33

## 2017-04-09 RX ADMIN — LACTOBACILLUS TAB 2 TABLET: TAB at 17:21

## 2017-04-09 RX ADMIN — OXYCODONE HYDROCHLORIDE AND ACETAMINOPHEN 1 TABLET: 5; 325 TABLET ORAL at 15:15

## 2017-04-09 RX ADMIN — OXYCODONE HYDROCHLORIDE AND ACETAMINOPHEN 1 TABLET: 5; 325 TABLET ORAL at 04:06

## 2017-04-09 RX ADMIN — OXYCODONE HYDROCHLORIDE AND ACETAMINOPHEN 1 TABLET: 5; 325 TABLET ORAL at 00:31

## 2017-04-09 RX ADMIN — CARVEDILOL 12.5 MG: 12.5 TABLET, FILM COATED ORAL at 08:12

## 2017-04-09 RX ADMIN — FOLIC ACID 1 MG: 1 TABLET ORAL at 08:12

## 2017-04-09 RX ADMIN — GABAPENTIN 300 MG: 300 CAPSULE ORAL at 17:21

## 2017-04-09 RX ADMIN — ALLOPURINOL 100 MG: 100 TABLET ORAL at 08:12

## 2017-04-09 RX ADMIN — STANDARDIZED SENNA CONCENTRATE AND DOCUSATE SODIUM 2 TABLET: 8.6; 5 TABLET, FILM COATED ORAL at 17:21

## 2017-04-09 RX ADMIN — DOCUSATE SODIUM 100 MG: 100 CAPSULE, LIQUID FILLED ORAL at 08:12

## 2017-04-09 RX ADMIN — OXYCODONE HYDROCHLORIDE AND ACETAMINOPHEN 1 TABLET: 5; 325 TABLET ORAL at 11:45

## 2017-04-09 RX ADMIN — OXYCODONE HYDROCHLORIDE AND ACETAMINOPHEN 1 TABLET: 5; 325 TABLET ORAL at 21:13

## 2017-04-09 NOTE — PROGRESS NOTES
Progress Note    Patient: Britt Severs MRN: [id number]  SSN: [id number]    YOB: 1950  Age: 77 y.o. Sex: female      Admit Date: 4/6/2017    LOS: 3 days     Subjective:     Patient s/p psoas hematoma admitted for therapy. Still some pain    Objective:     Vitals:    04/08/17 0715 04/08/17 1603 04/08/17 2046 04/09/17 0710   BP: 111/50 115/53 131/59 120/63   Pulse: 76 75 73 71   Resp: 20 20 18 18   Temp: 97.2 °F (36.2 °C) 99.2 °F (37.3 °C) 98.5 °F (36.9 °C) 98.3 °F (36.8 °C)   SpO2: 98% 97% 98% 99%   Weight:       Height:            Intake and Output:  Current Shift: 04/09 0701 - 04/09 1900  In: 120 [P.O.:120]  Out: -   Last three shifts: 04/07 1901 - 04/09 0700  In: 560 [P.O.:560]  Out: -     Physical Exam:   GENERAL: alert, cooperative, no distress, appears stated age  LUNG: clear to auscultation bilaterally  HEART: regular rate and rhythm, S1, S2 normal, no murmur, click, rub or gallop    Lab/Data Review: All lab results for the last 24 hours reviewed. Glucose 80     Assessment:     Principal Problem:    Psoas hematoma, right, secondary to anticoagulant therapy (3/30/2017)    Active Problems:    Type 2 diabetes mellitus with diabetic neuropathy ([facility] (6/28/2011)      Benign hypertensive heart disease with systolic CHF, NYHA class 2 ([facility] (9/5/2012)      Decreased calculated glomerular filtration rate (GFR) (3/30/2017)      Overview: Calculated GFR equivalent to that of CKD stage 3 = 30-59 ml/min      History of pyelonephritis (3/30/2017)      Iliopsoas muscle hematoma (3/30/2017)      Generalized weakness (3/30/2017)      Impaired mobility and ADLs (3/30/2017)      History of Coumadin therapy ()      Overview: Anticoagulation for chronic atrial fibrillation; Discontinued on 3/30/2017        Plan:     Continue present management.     Signed By: Latanya Bonner MD     April 9, 2017

## 2017-04-09 NOTE — PROGRESS NOTES
Problem: Self Care Deficits Care Plan (Adult)  Goal: *Therapy Goal (Edit Goal, Insert Text)  Long Term Goals (to be met upon discharge date) in order to increase pts functional independence and safety, and decrease burden of care:  1. Pt will perform grooming with independence. 2. Pt will perform UB bathing with modified independence. 3. Pt will perform LB bathing with modified independence. 4. Pt will perform shower transfer with modified independence. 5. Pt will perform UB dressing with independence. 6. Pt will perform LB dressing with modified independence. 7. Pt will perform toileting task with modified independence. 8. Pt will perform toilet transfer with modified independence. 9. Pt will perform an IADL task while standing with modified independence. Short Term Weekly Goals for (17 to 17) in order to increase pts functional independence and safety, and decrease burden of care:  1. Pt will perform grooming with independence. 2. Pt will perform UB bathing with modified independence. 3. Pt will perform LB bathing with supervision. 4. Pt will perform shower transfer with supervision. 5. Pt will perform UB dressing with independence. 6. Pt will perform LB dressing with supervision. 7. Pt will perform toileting task with supervision. 8. Pt will perform toilet transfer with supervision. OCCUPATIONAL THERAPY DAILY NOTE  Patient Name:Debby Gonzalez  Time Spent With Patient  Time In: 915  Time Out: 6013  Patient Seen For[de-identified] AM     Medical Diagnosis:  Debility  Psoas hematoma, right, secondary to anticoagulant therapy Psoas hematoma, right, secondary to anticoagulant therapy      Pain at start of tx:6  Pain at stop of tx:6     Patient identified with name and :yes  Subjective: \"My knee is really swollen today. \"     Objective:      THERAPEUTIC EXERCISE Daily Assessment   Pt treated in her room Completed B UE ROM & strengthening exercises seated with her R leg elevated.   Reviewed ROM exercises for her knee & ankle as Jessica Epperson, PT provided for her. Placed a CP on her R knee for 20 minutes which did help decrease her R knee edema from moderate to minimal edema. She was pleased. GROOMING Daily Assessment     Grooming  Grooming Assistance : 7 (Independent)  Comments: setup only & she completed her morning grooming routine. Assessment: Good tx participation & receptive with the information discussed. She expressed her concerns with the R knee edema and pain with weight bearing onto her R LE. Plan of Care: Continue poc to maximize function to meet her eval goals.      Kala Garcia, OTR/L  4/9/2017

## 2017-04-09 NOTE — ROUTINE PROCESS
SHIFT CHANGE NOTE FOR [place]    Bedside and Verbal shift change report given to Aline Cai RN (oncoming nurse) by Lj Long RN   (offgoing nurse). Report included the following information SBAR, Kardex, MAR and Recent Results. Situation:   Code Status: Full Code   Reason for Admission: Psoas Hematoma  Hospital Day: 3   Problem List:   Hospital Problems  Date Reviewed: 4/7/2017          Codes Class Noted POA    History of Coumadin therapy ICD-10-CM: Z79.01  ICD-9-CM: V58.61  Unknown Yes    Overview Signed 4/6/2017 10:35 PM by Aleksandar Concepcion MD     Anticoagulation for chronic atrial fibrillation; Discontinued on 3/30/2017             Decreased calculated glomerular filtration rate (GFR) ICD-10-CM: R94.4  ICD-9-CM: 794.4  3/30/2017 Yes    Overview Signed 4/6/2017  5:47 PM by Aleksandar Concepcion MD     Calculated GFR equivalent to that of CKD stage 3 = 30-59 ml/min             History of pyelonephritis ICD-10-CM: Z87.440  ICD-9-CM: V13.02  3/30/2017 Yes        Iliopsoas muscle hematoma ICD-10-CM: D95.78IX  ICD-9-CM: 924.00  3/30/2017 Yes        * (Principal)Psoas hematoma, right, secondary to anticoagulant therapy ICD-10-CM: S30. 1XXA  ICD-9-CM: 924.9, E934.2  3/30/2017 Yes        Generalized weakness ICD-10-CM: R53.1  ICD-9-CM: 780.79  3/30/2017 Yes        Impaired mobility and ADLs ICD-10-CM: Z74.09  ICD-9-CM: 799.89  3/30/2017 Yes        Benign hypertensive heart disease with systolic CHF, NYHA class 2 (HCC) (Chronic) ICD-10-CM: I11.0, I50.20  ICD-9-CM: 402.11, 428.20, 428.0  9/5/2012 Yes        Type 2 diabetes mellitus with diabetic neuropathy (HCC) (Chronic) ICD-10-CM: E11.40  ICD-9-CM: 250.60, 357.2  6/28/2011 Yes              Background:   Past Medical History:   Past Medical History:   Diagnosis Date    Benign hypertensive heart disease with systolic CHF, NYHA class 2 ([facility] 9/5/2012    Biventricular implantable cardioverter-defibrillator in situ 04/28/2005    Upgraded to BiV AICD; gen change 4/2008; pocket revision 10/2009; Abdominal - done on 8/22/2012 by Dr. Kleber Villagran Cardiac cath 08/15/1996    Patent coronaries. Elev LVEDP. EF 50-55%.  Cardiac echocardiogram 06/23/2015    Ltd study. EF 45-50%. Mild, diffuse hypk. Severe apical hypk. No mass or thrombus was clearly identified, although imaging was suboptimal.      Cardiac nuclear imaging test 06/19/2015    Fixed distal apical, distal septal defect more likely due to RV pacing than prior infarct. No ischemia. EF 46%. RWMA c/w RV pacing. Nondiagnostic EKG on pharm stress test.      Cardiovascular lower extremity venous duplex 09/04/2012    Acute, non-occlusive DVT in CFV on right. No DVT on left. No superficial thrombosis bilaterally.  Cardiovascular upper extremity venous duplex 08/27/2012    DVT in axillary vein on left. Left subclavian was not visualized.     Chronic anemia 9/5/2012    Chronic systolic heart failure (HCC)     Decreased calculated glomerular filtration rate (GFR) 3/30/2017    Calculated GFR equivalent to that of CKD stage 3 = 30-59 ml/min    Diabetic neuropathy associated with type 2 diabetes mellitus ([address] 6/28/2011    Difficult airway for intubation 08/22/2012    see anesthesia airway note    Dyslipidemia 6/28/2011    Gout     History of complete heart block 6/28/2011    History of Coumadin therapy     Anticoagulation for DVT of the LUE; Discontinued on 3/30/2017    History of deep venous thrombosis 9/5/2012    Left upper extremity    History of pyelonephritis 3/30/2017    Left bundle branch block (LBBB) on electrocardiogram 6/28/2011    Nonischemic cardiomyopathy ([address] 6/28/2011    Obesity (BMI 35.0-39.9 without comorbidity) ([address] 3/13/2017    Obstructive sleep apnea on CPAP 2/7/2012    Psoas hematoma, right, secondary to anticoagulant therapy 3/30/2017    Type 2 diabetes mellitus with diabetic neuropathy ([address] 6/28/2011      Patient taking anticoagulants no    Patient has a defibrillator: yes Assessment:   Changes in Assessment throughout shift: no     Patient has central line: no Reasons if yes: Insertion date: Last dressing date:   Patient has Krueger Cath: no Reasons if yes: Insertion date:     Last Vitals:     Vitals:    04/08/17 0715 04/08/17 1603 04/08/17 2046 04/09/17 0710   BP: 111/50 115/53 131/59 120/63   Pulse: 76 75 73 71   Resp: 20 20 18 18   Temp: 97.2 °F (36.2 °C) 99.2 °F (37.3 °C) 98.5 °F (36.9 °C) 98.3 °F (36.8 °C)   SpO2: 98% 97% 98% 99%   Weight:       Height:            PAIN    Pain Assessment    Pain Intensity 1: 8 (04/09/17 0800) Pain Intensity 1: 2 (12/29/14 1105)    Pain Location 1: Back Pain Location 1: Abdomen    Pain Intervention(s) 1: Medication (see MAR) Pain Intervention(s) 1: Medication (see MAR)  Patient Stated Pain Goal: 5 Patient Stated Pain Goal: 0  o Intervention effective: no    o Other actions taken for pain: pain medicine     Skin Assessment  Skin color Skin Color: Appropriate for ethnicity  Condition/Temperature Skin Condition/Temp: Warm  Integrity Skin Integrity: Intact  Turgor    Weekly Pressure Ulcer Documentation Weekly Pressure Ulcer Documentation: No Pressure Ulcer Noted-Pressure Ulcer Prevention Initiated  Wound Prevention & Protection Methods  Orientation of wound Orientation of Wound Prevention: Posterior  Location of Prevention Location of Wound Prevention: Sacrum/Coccyx  Dressing Present Dressing Present : No  Dressing Status    Wound Offloading Wound Offloading (Prevention Methods): Bed, pressure redistribution/air, Chair cushion, Pillows, Turning, Wheelchair     INTAKE/OUPUT    Date 04/08/17 0700 - 04/09/17 0659 04/09/17 0700 - 04/10/17 0659   Shift 0700-1859 1900-0659 24 Hour Total 0700-1859 1900-0659 24 Hour Total   I  N  T  A  K  E   P. O. 560  560         P. O. 560  560       Shift Total  (mL/kg) 560  (5.3)  560  (5.3)      O  U  T  P  U  T   Urine  (mL/kg/hr)            Urine Occurrence(s) 2 x 3 x 5 x 1 x  1 x    Stool            Stool Occurrence(s) 1 x 0 x 1 x 0 x  0 x    Shift Total  (mL/kg)           560      Weight (kg) 105.7 105.7 105.7 105.7 105.7 105.7       Recommendations:  1. Patient needs and requests: pain medicine    2. Diet: diabetic    3. Pending tests/procedures: no     4. Functional Level/Equipment: 1 x person assist    5. Estimated Discharge Date: TDB Posted on Whiteboard in Patients Room: [facility] Safety Check    A safety check occurred in the patient's room between off going nurse and oncoming nurse listed above. The safety check included the below items  Area Items   H  High Alert Medications - Verify all high alert medication drips (heparin, PCA, etc.)   E  Equipment - Suction is set up for ALL patients (with vania)  - Red plugs utilized for all equipment (IV pumps, etc.)  - WOWs wiped down at end of shift.  - Room stocked with oxygen, suction, and other unit-specific supplies   A  Alarms - Bed alarm is set for fall risk patients  - Ensure chair alarm is in place and activated if patient is up in a chair   L  Lines - Check IV for any infiltration  - Krueger bag is empty if patient has a Krueger   - Tubing and IV bags are labeled   S  Safety   - Room is clean, patient is clean, and equipment is clean. - Hallways are clear from equipment besides carts. - Fall bracelet on for fall risk patients  - Ensure room is clear and free of clutter  - Suction is set up for ALL patients (with vania)  - Hallways are clear from equipment besides carts.    - Isolation precautions followed, supplies available outside room, sign posted

## 2017-04-09 NOTE — ROUTINE PROCESS
SHIFT CHANGE NOTE FOR [place]    Bedside and Verbal shift change report given to Jaleesa Pace RN (oncoming nurse) by Paulie Perez RN   (offgoing nurse). Report included the following information SBAR, Kardex, MAR and Recent Results. Situation:   Code Status: Full Code   Reason for Admission: Psoas hematoma  Hospital Day: 3   Problem List:   Hospital Problems  Date Reviewed: 4/7/2017          Codes Class Noted POA    History of Coumadin therapy ICD-10-CM: Z79.01  ICD-9-CM: V58.61  Unknown Yes    Overview Signed 4/6/2017 10:35 PM by Ouida Barthel, MD     Anticoagulation for chronic atrial fibrillation; Discontinued on 3/30/2017             Decreased calculated glomerular filtration rate (GFR) ICD-10-CM: R94.4  ICD-9-CM: 794.4  3/30/2017 Yes    Overview Signed 4/6/2017  5:47 PM by Ouida Barthel, MD     Calculated GFR equivalent to that of CKD stage 3 = 30-59 ml/min             History of pyelonephritis ICD-10-CM: Z87.440  ICD-9-CM: V13.02  3/30/2017 Yes        Iliopsoas muscle hematoma ICD-10-CM: G51.01WY  ICD-9-CM: 924.00  3/30/2017 Yes        * (Principal)Psoas hematoma, right, secondary to anticoagulant therapy ICD-10-CM: S30. 1XXA  ICD-9-CM: 924.9, E934.2  3/30/2017 Yes        Generalized weakness ICD-10-CM: R53.1  ICD-9-CM: 780.79  3/30/2017 Yes        Impaired mobility and ADLs ICD-10-CM: Z74.09  ICD-9-CM: 799.89  3/30/2017 Yes        Benign hypertensive heart disease with systolic CHF, NYHA class 2 (HCC) (Chronic) ICD-10-CM: I11.0, I50.20  ICD-9-CM: 402.11, 428.20, 428.0  9/5/2012 Yes        Type 2 diabetes mellitus with diabetic neuropathy (HCC) (Chronic) ICD-10-CM: E11.40  ICD-9-CM: 250.60, 357.2  6/28/2011 Yes              Background:   Past Medical History:   Past Medical History:   Diagnosis Date    Benign hypertensive heart disease with systolic CHF, NYHA class 2 ([address] 9/5/2012    Biventricular implantable cardioverter-defibrillator in situ 04/28/2005    Upgraded to BiV AICD; gen change 4/2008; pocket revision 10/2009; Abdominal - done on 8/22/2012 by Dr. Turner Martin Cardiac cath 08/15/1996    Patent coronaries. Elev LVEDP. EF 50-55%.  Cardiac echocardiogram 06/23/2015    Ltd study. EF 45-50%. Mild, diffuse hypk. Severe apical hypk. No mass or thrombus was clearly identified, although imaging was suboptimal.      Cardiac nuclear imaging test 06/19/2015    Fixed distal apical, distal septal defect more likely due to RV pacing than prior infarct. No ischemia. EF 46%. RWMA c/w RV pacing. Nondiagnostic EKG on pharm stress test.      Cardiovascular lower extremity venous duplex 09/04/2012    Acute, non-occlusive DVT in CFV on right. No DVT on left. No superficial thrombosis bilaterally.  Cardiovascular upper extremity venous duplex 08/27/2012    DVT in axillary vein on left. Left subclavian was not visualized.     Chronic anemia 9/5/2012    Chronic systolic heart failure (HCC)     Decreased calculated glomerular filtration rate (GFR) 3/30/2017    Calculated GFR equivalent to that of CKD stage 3 = 30-59 ml/min    Diabetic neuropathy associated with type 2 diabetes mellitus ([address] 6/28/2011    Difficult airway for intubation 08/22/2012    see anesthesia airway note    Dyslipidemia 6/28/2011    Gout     History of complete heart block 6/28/2011    History of Coumadin therapy     Anticoagulation for DVT of the LUE; Discontinued on 3/30/2017    History of deep venous thrombosis 9/5/2012    Left upper extremity    History of pyelonephritis 3/30/2017    Left bundle branch block (LBBB) on electrocardiogram 6/28/2011    Nonischemic cardiomyopathy ([address] 6/28/2011    Obesity (BMI 35.0-39.9 without comorbidity) ([address] 3/13/2017    Obstructive sleep apnea on CPAP 2/7/2012    Psoas hematoma, right, secondary to anticoagulant therapy 3/30/2017    Type 2 diabetes mellitus with diabetic neuropathy ([address] 6/28/2011      Patient taking anticoagulants no    Patient has a defibrillator: yes Assessment:   Changes in Assessment throughout shift: no     Patient has central line: no Reasons if yes: Insertion date: Last dressing date:   Patient has Krueger Cath: no Reasons if yes: Insertion date:     Last Vitals:     Vitals:    04/07/17 2000 04/08/17 0715 04/08/17 1603 04/08/17 2046   BP: 107/60 111/50 115/53 131/59   Pulse: 79 76 75 73   Resp: 20 20 20 18   Temp: 98.4 °F (36.9 °C) 97.2 °F (36.2 °C) 99.2 °F (37.3 °C) 98.5 °F (36.9 °C)   SpO2:  98% 97% 98%   Weight:       Height:            PAIN    Pain Assessment    Pain Intensity 1: 0 (04/09/17 0105) Pain Intensity 1: 2 (12/29/14 1105)    Pain Location 1: Back Pain Location 1: Abdomen    Pain Intervention(s) 1: Medication (see MAR) Pain Intervention(s) 1: Medication (see MAR)  Patient Stated Pain Goal: 0 Patient Stated Pain Goal: 0  o Intervention effective: no    o Other actions taken for pain: pain medicine     Skin Assessment  Skin color Skin Color: Appropriate for ethnicity  Condition/Temperature Skin Condition/Temp: Warm  Integrity Skin Integrity: Intact  Turgor    Weekly Pressure Ulcer Documentation Weekly Pressure Ulcer Documentation: No Pressure Ulcer Noted-Pressure Ulcer Prevention Initiated  Wound Prevention & Protection Methods  Orientation of wound Orientation of Wound Prevention: Posterior  Location of Prevention Location of Wound Prevention: Sacrum/Coccyx  Dressing Present Dressing Present : No  Dressing Status    Wound Offloading Wound Offloading (Prevention Methods): Bed, pressure redistribution/air     INTAKE/OUPUT    Date 04/08/17 0700 - 04/09/17 0659 04/09/17 0700 - 04/10/17 0659   Shift 0700-1859 3733-4701 24 Hour Total 0700-1859 9444-3544 24 Hour Total   I  N  T  A  K  E   P. O. 560  560         P. O. 560  560       Shift Total  (mL/kg) 560  (5.3)  560  (5.3)      O  U  T  P  U  T   Urine  (mL/kg/hr)            Urine Occurrence(s) 2 x 2 x 4 x       Stool            Stool Occurrence(s) 1 x 0 x 1 x       Shift Total  (mL/kg)   560      Weight (kg) 105.7 105.7 105.7 105.7 105.7 105.7       Recommendations:  1. Patient needs and requests: pain medicine    2. Diet: diabetic    3. Pending tests/procedures: no     4. Functional Level/Equipment: 1 x person assist    5. Estimated Discharge Date: TDB Posted on Whiteboard in Patients Room: [facility] Safety Check    A safety check occurred in the patient's room between off going nurse and oncoming nurse listed above. The safety check included the below items  Area Items   H  High Alert Medications - Verify all high alert medication drips (heparin, PCA, etc.)   E  Equipment - Suction is set up for ALL patients (with vania)  - Red plugs utilized for all equipment (IV pumps, etc.)  - WOWs wiped down at end of shift.  - Room stocked with oxygen, suction, and other unit-specific supplies   A  Alarms - Bed alarm is set for fall risk patients  - Ensure chair alarm is in place and activated if patient is up in a chair   L  Lines - Check IV for any infiltration  - Krueger bag is empty if patient has a Krueger   - Tubing and IV bags are labeled   S  Safety   - Room is clean, patient is clean, and equipment is clean. - Hallways are clear from equipment besides carts. - Fall bracelet on for fall risk patients  - Ensure room is clear and free of clutter  - Suction is set up for ALL patients (with vania)  - Hallways are clear from equipment besides carts.    - Isolation precautions followed, supplies available outside room, sign posted

## 2017-04-10 LAB
ANION GAP BLD CALC-SCNC: 9 MMOL/L (ref 3–18)
BUN SERPL-MCNC: 11 MG/DL (ref 7–18)
BUN/CREAT SERPL: 10 (ref 12–20)
CALCIUM SERPL-MCNC: 9.2 MG/DL (ref 8.5–10.1)
CHLORIDE SERPL-SCNC: 100 MMOL/L (ref 100–108)
CO2 SERPL-SCNC: 27 MMOL/L (ref 21–32)
CREAT SERPL-MCNC: 1.1 MG/DL (ref 0.6–1.3)
GLUCOSE BLD STRIP.AUTO-MCNC: 118 MG/DL (ref 70–110)
GLUCOSE BLD STRIP.AUTO-MCNC: 125 MG/DL (ref 70–110)
GLUCOSE BLD STRIP.AUTO-MCNC: 128 MG/DL (ref 70–110)
GLUCOSE BLD STRIP.AUTO-MCNC: 191 MG/DL (ref 70–110)
GLUCOSE SERPL-MCNC: 115 MG/DL (ref 74–99)
HCT VFR BLD AUTO: 27.2 % (ref 35–45)
HGB BLD-MCNC: 9 G/DL (ref 12–16)
POTASSIUM SERPL-SCNC: 4.5 MMOL/L (ref 3.5–5.5)
SODIUM SERPL-SCNC: 136 MMOL/L (ref 136–145)

## 2017-04-10 RX ORDER — OXYCODONE AND ACETAMINOPHEN 7.5; 325 MG/1; MG/1
1 TABLET ORAL
Status: DISCONTINUED | OUTPATIENT
Start: 2017-04-10 | End: 2017-04-20 | Stop reason: HOSPADM

## 2017-04-10 RX ORDER — CARVEDILOL 3.12 MG/1
6.25 TABLET ORAL 2 TIMES DAILY WITH MEALS
Status: DISCONTINUED | OUTPATIENT
Start: 2017-04-10 | End: 2017-04-20 | Stop reason: HOSPADM

## 2017-04-10 RX ORDER — OXYCODONE AND ACETAMINOPHEN 7.5; 325 MG/1; MG/1
1 TABLET ORAL 3 TIMES DAILY
Status: DISCONTINUED | OUTPATIENT
Start: 2017-04-10 | End: 2017-04-20

## 2017-04-10 RX ORDER — ONDANSETRON 4 MG/1
4 TABLET, FILM COATED ORAL
Status: DISCONTINUED | OUTPATIENT
Start: 2017-04-10 | End: 2017-04-20

## 2017-04-10 RX ORDER — GABAPENTIN 400 MG/1
400 CAPSULE ORAL 2 TIMES DAILY
Status: DISCONTINUED | OUTPATIENT
Start: 2017-04-11 | End: 2017-04-14

## 2017-04-10 RX ADMIN — OXYCODONE HYDROCHLORIDE AND ACETAMINOPHEN 1 TABLET: 5; 325 TABLET ORAL at 01:19

## 2017-04-10 RX ADMIN — ACETAMINOPHEN 650 MG: 325 TABLET ORAL at 06:14

## 2017-04-10 RX ADMIN — OXYCODONE HYDROCHLORIDE AND ACETAMINOPHEN 1 TABLET: 5; 325 TABLET ORAL at 08:20

## 2017-04-10 RX ADMIN — OXYCODONE HYDROCHLORIDE AND ACETAMINOPHEN 1 TABLET: 5; 325 TABLET ORAL at 12:00

## 2017-04-10 RX ADMIN — LACTOBACILLUS TAB 2 TABLET: TAB at 08:20

## 2017-04-10 RX ADMIN — DOCUSATE SODIUM 100 MG: 100 CAPSULE, LIQUID FILLED ORAL at 08:21

## 2017-04-10 RX ADMIN — INSULIN GLARGINE 25 UNITS: 100 INJECTION, SOLUTION SUBCUTANEOUS at 21:10

## 2017-04-10 RX ADMIN — GABAPENTIN 300 MG: 300 CAPSULE ORAL at 08:21

## 2017-04-10 RX ADMIN — STANDARDIZED SENNA CONCENTRATE AND DOCUSATE SODIUM 2 TABLET: 8.6; 5 TABLET, FILM COATED ORAL at 17:22

## 2017-04-10 RX ADMIN — FOLIC ACID 1 MG: 1 TABLET ORAL at 08:21

## 2017-04-10 RX ADMIN — ONDANSETRON HYDROCHLORIDE 4 MG: 4 TABLET, FILM COATED ORAL at 21:05

## 2017-04-10 RX ADMIN — LACTOBACILLUS TAB 2 TABLET: TAB at 17:22

## 2017-04-10 RX ADMIN — ALLOPURINOL 100 MG: 100 TABLET ORAL at 08:21

## 2017-04-10 RX ADMIN — OXYCODONE HYDROCHLORIDE AND ACETAMINOPHEN 1 TABLET: 7.5; 325 TABLET ORAL at 16:05

## 2017-04-10 RX ADMIN — GABAPENTIN 300 MG: 300 CAPSULE ORAL at 17:22

## 2017-04-10 RX ADMIN — OXYCODONE HYDROCHLORIDE AND ACETAMINOPHEN 1 TABLET: 7.5; 325 TABLET ORAL at 20:37

## 2017-04-10 NOTE — ROUTINE PROCESS
SHIFT CHANGE NOTE FOR [place]    Bedside and Verbal shift change report given to Jessy Cabrera RN (oncoming nurse) by Imelda Webb RN   (offgoing nurse). Report included the following information SBAR, Kardex, MAR and Recent Results. Situation:   Code Status: Full Code   Reason for Admission: Psoas Hematoma  Hospital Day: 4   Problem List:   Hospital Problems  Date Reviewed: 4/7/2017          Codes Class Noted POA    History of Coumadin therapy ICD-10-CM: Z79.01  ICD-9-CM: V58.61  Unknown Yes    Overview Signed 4/6/2017 10:35 PM by Merline Fernandez MD     Anticoagulation for chronic atrial fibrillation; Discontinued on 3/30/2017             Decreased calculated glomerular filtration rate (GFR) ICD-10-CM: R94.4  ICD-9-CM: 794.4  3/30/2017 Yes    Overview Signed 4/6/2017  5:47 PM by Merline Fernandez MD     Calculated GFR equivalent to that of CKD stage 3 = 30-59 ml/min             History of pyelonephritis ICD-10-CM: Z87.440  ICD-9-CM: V13.02  3/30/2017 Yes        Iliopsoas muscle hematoma ICD-10-CM: Z64.94XY  ICD-9-CM: 924.00  3/30/2017 Yes        * (Principal)Psoas hematoma, right, secondary to anticoagulant therapy ICD-10-CM: S30. 1XXA  ICD-9-CM: 924.9, E934.2  3/30/2017 Yes        Generalized weakness ICD-10-CM: R53.1  ICD-9-CM: 780.79  3/30/2017 Yes        Impaired mobility and ADLs ICD-10-CM: Z74.09  ICD-9-CM: 799.89  3/30/2017 Yes        Benign hypertensive heart disease with systolic CHF, NYHA class 2 (HCC) (Chronic) ICD-10-CM: I11.0, I50.20  ICD-9-CM: 402.11, 428.20, 428.0  9/5/2012 Yes        Type 2 diabetes mellitus with diabetic neuropathy (HCC) (Chronic) ICD-10-CM: E11.40  ICD-9-CM: 250.60, 357.2  6/28/2011 Yes              Background:   Past Medical History:   Past Medical History:   Diagnosis Date    Benign hypertensive heart disease with systolic CHF, NYHA class 2 ([address] 9/5/2012    Biventricular implantable cardioverter-defibrillator in situ 04/28/2005    Upgraded to BiV AICD; gen change 4/2008; pocket revision 10/2009; Abdominal - done on 8/22/2012 by Dr. Jac Robert Cardiac cath 08/15/1996    Patent coronaries. Elev LVEDP. EF 50-55%.  Cardiac echocardiogram 06/23/2015    Ltd study. EF 45-50%. Mild, diffuse hypk. Severe apical hypk. No mass or thrombus was clearly identified, although imaging was suboptimal.      Cardiac nuclear imaging test 06/19/2015    Fixed distal apical, distal septal defect more likely due to RV pacing than prior infarct. No ischemia. EF 46%. RWMA c/w RV pacing. Nondiagnostic EKG on pharm stress test.      Cardiovascular lower extremity venous duplex 09/04/2012    Acute, non-occlusive DVT in CFV on right. No DVT on left. No superficial thrombosis bilaterally.  Cardiovascular upper extremity venous duplex 08/27/2012    DVT in axillary vein on left. Left subclavian was not visualized.     Chronic anemia 9/5/2012    Chronic systolic heart failure (HCC)     Decreased calculated glomerular filtration rate (GFR) 3/30/2017    Calculated GFR equivalent to that of CKD stage 3 = 30-59 ml/min    Diabetic neuropathy associated with type 2 diabetes mellitus ([address] 6/28/2011    Difficult airway for intubation 08/22/2012    see anesthesia airway note    Dyslipidemia 6/28/2011    Gout     History of complete heart block 6/28/2011    History of Coumadin therapy     Anticoagulation for DVT of the LUE; Discontinued on 3/30/2017    History of deep venous thrombosis 9/5/2012    Left upper extremity    History of pyelonephritis 3/30/2017    Left bundle branch block (LBBB) on electrocardiogram 6/28/2011    Nonischemic cardiomyopathy ([address] 6/28/2011    Obesity (BMI 35.0-39.9 without comorbidity) ([address] 3/13/2017    Obstructive sleep apnea on CPAP 2/7/2012    Psoas hematoma, right, secondary to anticoagulant therapy 3/30/2017    Type 2 diabetes mellitus with diabetic neuropathy ([address] 6/28/2011      Patient taking anticoagulants no    Patient has a defibrillator: yes Assessment:   Changes in Assessment throughout shift: no     Patient has central line: no Reasons if yes: Insertion date: Last dressing date:   Patient has Krueger Cath: no Reasons if yes: Insertion date:     Last Vitals:     Vitals:    04/09/17 0710 04/09/17 1555 04/09/17 2029 04/10/17 0751   BP: 120/63 115/66 109/60 111/61   Pulse: 71 87 77 76   Resp: 18 18 18 18   Temp: 98.3 °F (36.8 °C) 98.7 °F (37.1 °C) 98.5 °F (36.9 °C) 98 °F (36.7 °C)   SpO2: 99% 100% 98% 99%   Weight:       Height:            PAIN    Pain Assessment    Pain Intensity 1: 8 (04/10/17 0800) Pain Intensity 1: 2 (12/29/14 1105)    Pain Location 1: Back Pain Location 1: Abdomen    Pain Intervention(s) 1: Medication (see MAR) Pain Intervention(s) 1: Medication (see MAR)  Patient Stated Pain Goal: 5 Patient Stated Pain Goal: 0  o Intervention effective: no    o Other actions taken for pain: pain medicine     Skin Assessment  Skin color Skin Color: Appropriate for ethnicity  Condition/Temperature Skin Condition/Temp: Warm  Integrity Skin Integrity: Intact  Turgor    Weekly Pressure Ulcer Documentation Weekly Pressure Ulcer Documentation: No Pressure Ulcer Noted-Pressure Ulcer Prevention Initiated  Wound Prevention & Protection Methods  Orientation of wound Orientation of Wound Prevention: Posterior  Location of Prevention Location of Wound Prevention: Sacrum/Coccyx  Dressing Present Dressing Present : No  Dressing Status    Wound Offloading Wound Offloading (Prevention Methods): Bed, pressure redistribution/air, Chair cushion, Pillows, Turning, Wheelchair     INTAKE/OUPUT    Date 04/09/17 0700 - 04/10/17 0659 04/10/17 0700 - 04/11/17 0659   Shift 0700-1859 1900-0659 24 Hour Total 0700-1859 1900-0659 24 Hour Total   I  N  T  A  K  E   P.O. 480  480 240  240      P. O. 480  480 240  240    Shift Total  (mL/kg) 480  (4.5)  480  (4.5) 240  (2.3)  240  (2.3)   O  U  T  P  U  T   Urine  (mL/kg/hr)            Urine Occurrence(s) 3 x 4 x 7 x 1 x 1 x    Stool            Stool Occurrence(s) 0 x 0 x 0 x 0 x  0 x    Shift Total  (mL/kg)           480 240  240   Weight (kg) 105.7 105.7 105.7 105.7 105.7 105.7       Recommendations:  1. Patient needs and requests: pain medicine    2. Diet: diabetic    3. Pending tests/procedures: no     4. Functional Level/Equipment: 1 x person assist    5. Estimated Discharge Date: TDB Posted on Whiteboard in Patients Room: [facility] Safety Check    A safety check occurred in the patient's room between off going nurse and oncoming nurse listed above. The safety check included the below items  Area Items   H  High Alert Medications - Verify all high alert medication drips (heparin, PCA, etc.)   E  Equipment - Suction is set up for ALL patients (with vania)  - Red plugs utilized for all equipment (IV pumps, etc.)  - WOWs wiped down at end of shift.  - Room stocked with oxygen, suction, and other unit-specific supplies   A  Alarms - Bed alarm is set for fall risk patients  - Ensure chair alarm is in place and activated if patient is up in a chair   L  Lines - Check IV for any infiltration  - Krueger bag is empty if patient has a Krueger   - Tubing and IV bags are labeled   S  Safety   - Room is clean, patient is clean, and equipment is clean. - Hallways are clear from equipment besides carts. - Fall bracelet on for fall risk patients  - Ensure room is clear and free of clutter  - Suction is set up for ALL patients (with vania)  - Hallways are clear from equipment besides carts.    - Isolation precautions followed, supplies available outside room, sign posted

## 2017-04-10 NOTE — PROGRESS NOTES
Problem: Mobility Impaired (Adult and Pediatric)  Goal: *Acute Goals and Plan of Care (Insert Text)  Physical Therapy Short Goals  Initiated 4/7/2017 and to be accomplished within 7 day(s)  1. Patient will move from supine to sit and sit to supine , scoot up and down and roll side to side in bed with maximal assistance. 2. Patient will transfer from bed to chair and chair to bed with CGA using the least restrictive device. 3. Patient will perform sit to stand with supervision/set-up. 4. Patient will ambulate with minimal assistance/contact guard assist for 25 feet with the least restrictive device.       Physical Therapy Long Goals  Initiated 4/7/2017 and to be accomplished within 21 day(s)  1. Patient will move from supine to sit and sit to supine , scoot up and down and roll side to side in bed with modified independence. 2. Patient will transfer from bed to chair and chair to bed with Mod I using the least restrictive device. 3. Patient will perform sit to stand with modified independence. 4. Patient will ambulate with supervision/set-up for 150 feet with the least restrictive device. 5. Patient will ascend/descend 3 stairs with 1 handrail(s) with minimal assistance/contact guard assist.   PHYSICAL THERAPY DAILY NOTE  Patient Pat Gonzalez  Time In: 2642  Time Out: 1320  Patient Seen For: Wheelchair mobility;Balance activities  Diagnosis: Debility  Psoas hematoma, right, secondary to anticoagulant therapy Psoas hematoma, right, secondary to anticoagulant therapy  Precautions: Fall Risk; WBAT R LE     Subjective: Patient reports R anterior hip/groin pain wrapping to her back at L4/L5 level patient report burning sensation with occasional wrapping around trunk. Patient educated on origin/insertion of psoas muscle as well as the space occupying effects of the hematoma; Patient encouraged to participate to the best of her ability; nursing notified of pt presentation.      Pain at start of tx:9/10  Pain at stop of tx:9/10     Patient identified with name and : YES         Objective:       TRANSFERS Daily Assessment     Sit to Stand Assistance: Contact guard assistance     Patient requires anterior approach from physical therapist to reduce anxiety and allow pt a sense of security with STS transition. PT provides verbal cues for set-up positioning and initiation of anterior lean/press-up. Patient able to progress from sitting to 80% stand w/o and tactile cueing or physical assistance, however at this point the patient requires B UE on PT elbows with tactile cues on lumbar spine and rhomboids to promote upright standing posture. Patient reports significant increase in burning at this time; PT advises to attempt glut setting activity to provide reciprocal inhibition to hip flexors, though patient unable to integrate at this time 2/2 increased pain/anxiety. Patient performs STS x 2 in session for 35 sec initially and 52 sec for final trial with increased cueing for motivation and posturing. Upon attempts to improve upright posturing R hip/knee flexion occurs at 1:1 ratio with trunk extension to prevent iliopsoas lengthening. Patient inattentive to verbal cueing to achieve foot flat. BALANCE Daily Assessment     Sitting - Static: Good (unsupported)  Sitting - Dynamic: Fair (occasional)  Standing - Static: Poor              Assessment: Patient demonstrates pain perception consistent with psoas space occupying hematoma as well as possible referral pain pattern from psoas innervation (anterior rami L1-L3) presenting with dermatomal pain pattern across the lower trunk. Patient will require continued skilled physical therapy intervention to address hip extension limitations and reduce perceived threat associated with hip extension posturing. Patient would benefit from continued glut setting during functional posturing for hip flexor reciprocal inhibition.               Plan of Care: Cont current POC; increased focus on glut setting with attempts to posture in standing. Balaji Velasquez, PT DPT  4/10/2017

## 2017-04-10 NOTE — INTERDISCIPLINARY ROUNDS
[address]  [facility] SUMMARY     Date of Conference: 4/11/17    Name: Cuate Shaikh Age / Sex: 77 y.o. / female   CSN: [id number] MRN: [id number]   [facility] Date: 4/6/2017 Length of Stay: 4 days     Primary Rehab Diagnosis: Impaired Mobility and ADLs secondary to Psoas hematoma, right, secondary to anticoagulant therapy      Therapy:     FIM SCORES Initial Assessment Weekly Progress Assessment 4/10/2017   Eating Functional Level: 7  7   Swallowing     Grooming 5  5   Bathing 3  3      Upper Body Dressing Functional Level: 5  Items Applied/Steps Completed: Pullover (4 steps), Bra (3 steps)  Comments: Pt able to manage clothing while seated in w/c. Pt would require assistance with obtaining clothing. 5   Lower Body Dressing Functional Level: 3  Items Applied/Steps Completed: Sock, right (1 step), Sock, left (1 step), Elastic waist pants (3 steps)  Comments: Pt required assistance with initially diane/doffing R sock 2/2 increased R groin/buttocks pain. During second attempt, pt was able to cross R LE to diane sock with increased time. Pt also diane/doffed L sock with crossing LE with more ease. Pt also managed pants over hips while standing with CGA for balance with x1 UE support on counter/walker with forward flexed posture. Pt required assistance with pulling pants over buttocks. 3   Toileting Functional Level: 4  Comments: Pt was able to manage pants over hips with SBA-CGA for safety/balance with RW. Pt able to also manage hygiene standing.   4   Bladder  level of assist 4 4   Bladder  accident frequency score 6     Bowel  level of assist 4 4   Bowel  accident frequency score 6     Toilet Transfer [place] Toilet Transfer Score: 4  Comments: Pt performed stand step transfer with RW from w/c to toilet with BSC at min A for sit to stand and CGA for transfer with forward flexed posture and cues for proper hand placement. 4   Tub/Shower Transfer [place] Tub or Shower Type: Shower  Tub/Shower Transfer Score: 0  Comments: NT 2/2 pt bathe at sink this session. 0      Comprehension Primary Mode of Comprehension: Auditory  Score: 6        Expression Primary Mode of Expression: Verbal  Score: 6        Social Interaction Score: 6     Problem Solving Score: 5     Memory Score: 5       FIM SCORES Initial Assessment Weekly Progress Assessment 4/10/2017   Bed/Chair/Wheelchair Transfers Transfer Type: Other  Other: Patient performs stand step transfer with anterior aproach, B UE support, weight shift cues, step placement cues and controlled lowering. Patient with significant antalgia with R LE loading and forward flexed posturing requiring assist to achieve correct LE placement. Transfer Assistance : 4 (Minimal assistance)  Sit to Stand Assistance: Minimal assistance  Car Transfers: Not tested  Car Type: NA Transfer Type:    Other: stand step without AD  Transfer Assistance : 4 (Minimal assistance)  Sit to Stand Assistance: Minimal assistance   Bed Mobility Rolling Right 1 (Total assistance) (unable to assess 2/2 inability to lay supine)   Rolling Left 1 (Total assistance) (unable to assess 2/2 inability to lay supine)   Supine to Sit 1 (Total assistance) (unable to assess 2/2 inability to lay supine)   Sit to Stand Minimal assistance   Sit to Supine 1 (Total assistance) (unable to assess 2/2 inability to lay supine)    Rolling Right    1   Rolling Left    1   Supine to Sit    1   Sit to Stand   Minimal assistance   Sit to Supine    1      Locomotion (W/C) Able to Propel (ft): 155 feet (B UE technique with SPV verbal cues for technique)  Functional Level: 5  Curbs/Ramps Assist Required (FIM Score): 0 (Not tested)  Wheelchair Setup Assist Required : 3 (Moderate assistance)  Wheelchair Management: Manages left brake, Manages right brake (w/ extender, verbal and visual cues + min a x placement) Function 5  Setup Assistance - 5  300ft; propels with bilateral upper extremities          Locomotion (W/C distance) 155 Feet (B UE technique with SPV for intermittent safety/technique cu) 300 feet   Locomotion (Walk) 0 (Not tested) (Pt unable at this time)  1 unable       Locomotion (Walk dist.) 0 Feet (ft) 0ft    Steps/Stairs Steps/Stairs Ambulated (#): 0 (Pt unable)  Level of Assist : 0 (Not tested)  Rail Use:  (NA)  0 - unable         Nursing:     Neuro:   A&O x_4___                   Respiratory:   [x] WNL   [] O2   [] LPM ______   Other:  Peripheral Vascular:   [x] TEDS present   [] Edema present ____ Grade  Cardiac:   [x] WNL   [x] Other  Genitourinary:   [x] continent   [] incontinent   [] perez  Abdominal _______ LBM  GI: _diabetic______ Diet __thin____ Liquids __no___ tube feeds  Musculoskeletal: _active___ ROM Transfers _wheelchair, gait belt____ Assistive Device Used  _1 x person assist___ Level of Assistance  Skin Integumentary:   [x] Intact   [] Not Intact   __________Preventative Measures  Details______reposition________________________________________________________  Pain: [] Controlled   [x] Not Controlled   Pain Meds:   [x] Scheduled   [x] PRN        Registered Dietitian / Nutrition:   Patient Vitals for the past 100 hrs:   % Diet Eaten   04/10/17 1319 25 %   04/10/17 0946 75 %   04/09/17 1828 85 %   04/09/17 1300 90 %   04/09/17 0900 25 %   04/08/17 1740 70 %   04/08/17 1255 50 %   04/08/17 0900 50 %   04/07/17 1300 50 %   04/07/17 0930 75 %               Supplements:          [] Yes   [] No      Amount of supplement consumed:        Intake/Output Summary (Last 24 hours) at 04/10/17 1407  Last data filed at 04/10/17 1319   Gross per 24 hour   Intake              600 ml   Output                0 ml   Net              600 ml                              Last bowel movement:         Interdisciplinary Team Goals:     1. Goal  Pt will perform shower transfer stepping over a threshold  with supervision.     Barrier  R hip and back pain, dynamic standing balance    Intervention  functional transfers, I/ADLs, dynamic sitting and standing balance     2. Goal  Pt will perform sit<->stand with increased right weight lower extremity weight bearing. Barrier  decreased hip extension right, decreased lower extremity strength, increased pain    Intervention Practice, lower extremity strengthening, range of motion exercises. 3. Goal      Barrier      Intervention       4. Goal  Nursing: Patient will have pain level less than 5/10 during rehab stay    Barrier  psoas hematoma    Intervention  To assess pain every 4 hours, and give pain medicine as need     5. Goal      Barrier      Intervention       6. Goal      Barrier      Intervention         Disposition / Discharge Planning: Follow-up therapy services:  tbd   DME recommendations:  tbd   Estimated discharge date:  tbd   Discharge Location:  tbd         Electronic Signatures:      Signature Date Signed   Physical Therapist    ESPERANZA Odonnell, PT, DPT 4/10/17  4/10/17   Occupational Therapist    MARKO Lopez 4/10/17   Speech Therapist         Recreational Therapist    Stephania Hall, [address]  4/10/17   Nursing    Liberty Hendrickson, RN  4/10/17   Dietitian         Clinical Psychologist         Physician             LLOYD Pelletier  4/10/17         The above information has been reviewed with the patient in a language that they can understand. Opportunity for comments and questions has been provided and a signed attestation has been scanned into the \"media tab\" of the EMR.       Patient Signature: ______________________________________________________    Date Signed: __________________________________________________________

## 2017-04-10 NOTE — PROGRESS NOTES
[facility] PHYSICAL REHABILITATION  [address] [place] [address], [address]     INPATIENT REHABILITATION  DAILY PROGRESS NOTE     Date: 4/10/2017    Name: oNlan Goss Age / Sex: 77 y.o. / female   CSN: [id number] MRN: [id number]   [facility] Date: 4/6/2017 Length of Stay: 4 days     Primary Rehab Diagnosis: Impaired Mobility and ADLs secondary to Right iliopsoas muscle hematoma, secondary to supratherapeutic INR/anticoagulant therapy      Subjective:     Patient seen and examined. Blood pressure on the low normal side. Carvedilol mostly not being given due to BP parameters. Blood sugars controlled. Patient's Complaint:   Uncontrolled pain with burning sensation on right thigh/hip on deep pressure    Pain Control: increasing significant pain on present analgesia regimen      Objective:     Vital Signs:  Patient Vitals for the past 24 hrs:   BP Temp Pulse Resp SpO2   04/10/17 0751 111/61 98 °F (36.7 °C) 76 18 99 %   04/09/17 2029 109/60 98.5 °F (36.9 °C) 77 18 98 %   04/09/17 1555 115/66 98.7 °F (37.1 °C) 87 18 100 %        Physical Exam:  GENERAL SURVEY: Patient is awake, alert, oriented x 3, sitting comfortably on the chair, not in acute respiratory distress. HEENT: pink palpebral conjunctivae, anicteric sclerae, no nasoaural discharge, moist oral mucosa  NECK: supple, no jugular venous distention, no palpable lymph nodes  CHEST/LUNGS: symmetrical chest expansion, good air entry, clear breath sounds  HEART: adynamic precordium, good S1 S2, no S3, regular rhythm, no murmurs  ABDOMEN: obese, bowel sounds appreciated, soft, non-tender  EXTREMITIES: pink nailbeds, no edema, full and equal pulses, no calf tenderness   NEUROLOGICAL EXAM: The patient is awake, alert and oriented x3, able to answer questions fairly appropriately, able to follow 1 and 2 step commands. Able to tell time from the wall clock. Cranial nerves II-XII are grossly intact. No gross sensory deficit.  Motor strength is 4/5 on BUE, 4-/5 on the left hip, 4+/5 on the left knee and left ankle, 2+/5 on the right hip (due to pain), 3+/5 on the right knee (due to right hip pain), 4/5 on the right ankle. Current Medications:  Current Facility-Administered Medications   Medication Dose Route Frequency    allopurinol (ZYLOPRIM) tablet 100 mg  100 mg Oral DAILY    oxyCODONE-acetaminophen (PERCOCET) 5-325 mg per tablet 1 Tab  1 Tab Oral TID    oxyCODONE-acetaminophen (PERCOCET) 5-325 mg per tablet 1 Tab  1 Tab Oral Q4H PRN    docusate sodium (COLACE) capsule 100 mg  100 mg Oral DAILY AFTER BREAKFAST    senna-docusate (PERICOLACE) 8.6-50 mg per tablet 2 Tab  2 Tab Oral PCD    polyethylene glycol (MIRALAX) packet 17 g  17 g Oral DAILY    insulin glargine (LANTUS) injection 25 Units  25 Units SubCUTAneous QHS    acetaminophen (TYLENOL) tablet 650 mg  650 mg Oral Q4H PRN    bisacodyl (DULCOLAX) tablet 10 mg  10 mg Oral Q48H PRN    insulin lispro (HUMALOG) injection   SubCUTAneous TIDAC    carvedilol (COREG) tablet 12.5 mg  12.5 mg Oral BID WITH MEALS    folic acid (FOLVITE) tablet 1 mg  1 mg Oral DAILY    gabapentin (NEURONTIN) capsule 300 mg  300 mg Oral BID    glucose chewable tablet 16 g  4 Tab Oral PRN    glucagon (GLUCAGEN) injection 1 mg  1 mg IntraMUSCular PRN    dextrose (D50W) injection syrg 12.5-25 g  25-50 mL IntraVENous PRN    Lactobacillus Acidoph & Bulgar (FLORANEX) tablet 2 Tab  2 Tab Oral BID       Allergies:   Allergies   Allergen Reactions    Vancomycin Itching    Ampicillin Itching    Bactrim [Sulfamethoxazole-Trimethoprim] Unknown (comments)    Blueberry Swelling     Causes throat swelling    Ciprofloxacin Itching    Codeine Other (comments)     Jumpy feeling    Crestor [Rosuvastatin] Itching    Darvocet A500 [Propoxyphene N-Acetaminophen] Itching    Demerol [Meperidine] Itching    Levaquin [Levofloxacin] Itching    Lipitor [Atorvastatin] Myalgia    Magnesium Oxide Itching     nausea    Minocin [Minocycline] Unknown (comments)    Pcn [Penicillins] Itching    Pravachol [Pravastatin] Swelling     Swelling in mouth     Sulfa (Sulfonamide Antibiotics) Itching    Ultracet [Tramadol-Acetaminophen] Itching    Vicodin [Hydrocodone-Acetaminophen] Unknown (comments)    Vytorin 10-10 [Ezetimibe-Simvastatin] Myalgia    Percodan [Oxycodone Hcl-Oxycodone-Asa] Itching       Lab/Data Review:  Recent Results (from the past 24 hour(s))   GLUCOSE, POC    Collection Time: 04/09/17  4:34 PM   Result Value Ref Range    Glucose (POC) 103 70 - 110 mg/dL   GLUCOSE, POC    Collection Time: 04/09/17  8:31 PM   Result Value Ref Range    Glucose (POC) 125 (H) 70 - 157 mg/dL   METABOLIC PANEL, BASIC    Collection Time: 04/10/17  6:13 AM   Result Value Ref Range    Sodium 136 136 - 145 mmol/L    Potassium 4.5 3.5 - 5.5 mmol/L    Chloride 100 100 - 108 mmol/L    CO2 27 21 - 32 mmol/L    Anion gap 9 3.0 - 18 mmol/L    Glucose 115 (H) 74 - 99 mg/dL    BUN 11 7.0 - 18 MG/DL    Creatinine 1.10 0.6 - 1.3 MG/DL    BUN/Creatinine ratio 10 (L) 12 - 20      GFR est AA >60 >60 ml/min/1.73m2    GFR est non-AA 50 (L) >60 ml/min/1.73m2    Calcium 9.2 8.5 - 10.1 MG/DL   HGB & HCT    Collection Time: 04/10/17  6:13 AM   Result Value Ref Range    HGB 9.0 (L) 12.0 - 16.0 g/dL    HCT 27.2 (L) 35.0 - 45.0 %   GLUCOSE, POC    Collection Time: 04/10/17  7:20 AM   Result Value Ref Range    Glucose (POC) 118 (H) 70 - 110 mg/dL   GLUCOSE, POC    Collection Time: 04/10/17 12:16 PM   Result Value Ref Range    Glucose (POC) 128 (H) 70 - 110 mg/dL       Estimated Glomerular Filtration Rate:  CKD-EPI:   On admission, estimated GFR was 53.5 mL/min/1.73m2 based on a Creatinine of 1.22 mg/dl. Most recent estimated GFR was 60.6 mL/min/1.73m2 based on a Creatinine of 1.10 mg/dl. MDRD:   On admission, estimated GFR was 56.7 mL/min/1.73m2 based on a Creatinine of 1.22 mg/dl. Most recent estimated GFR was 63.9 mL/min/1.73m based on a Creatinine of 1.10 mg/dl. 2. Assessment:     Primary Rehabilitation Diagnosis  1. Impaired Mobility and ADLs  2. Right iliopsoas muscle hematoma, secondary to supratherapeutic INR/anticoagulant therapy      Comorbidities   Nonischemic cardiomyopathy    History of complete heart block    Biventricular implantable cardioverter-defibrillator in situ    Left bundle branch block (LBBB) on electrocardiogram    Type 2 diabetes mellitus with diabetic neuropathy    Dyslipidemia    Diabetic neuropathy associated with type 2 diabetes mellitus    Obstructive sleep apnea on CPAP    History of AICD generator infection     Difficult airway for intubation    Benign hypertensive heart disease with systolic CHF, NYHA class 2     Decreased calculated glomerular filtration rate (GFR)    Chronic anemia    History of deep venous thrombosis    Anticoagulated on Coumadin    Pacemaker twiddler's syndrome    Chronic systolic heart failure     Obesity (BMI 35.0-39.9 without comorbidity)     History of pyelonephritis    History of Coumadin therapy    Gout       Plan:     1. Justification for continued stay: Good progression towards established rehabilitation goals. 2. Medical Issues being followed closely:    [x]  Fall and safety precautions     []  Wound Care     [x]  Bowel and Bladder Function     [x]  Fluid Electrolyte and Nutrition Balance     [x]  Pain Control      3. Issues that 24 hour rehabilitation nursing is following:    [x]  Fall and safety precautions     []  Wound Care     [x]  Bowel and Bladder Function     [x]  Fluid Electrolyte and Nutrition Balance     [x]  Pain Control      [x]  Assistance with and education on in-room safety with transfers to and from the bed, wheelchair, toilet and shower. 4. Acute rehabilitation plan of care:    [x]  Continue current care and rehab. [x]  Physical Therapy           [x]  Occupational Therapy           []  Speech Therapy     []  Hold Rehab until further notice     5. Medications:    [x]  MAR Reviewed     [x]  Continue Present Medications     6. DVT Prophylaxis:      []  Lovenox     []  Unfractionated Heparin     []  Coumadin     []  Xarelto     [x]  MIMI Stockings     [x]  Sequential Compression Device     []  None     7. Orders:   > Right iliopsoas muscle hematoma, secondary to supratherapeutic INR/anticoagulant therapy   > CT scan of the abdomen and pelvis (3/30/2017) showed an asymmetrically enlarged right iliopsoas muscle compared to the left extending to the right groin region with evidence of intramuscular hematoma in different stage; this is new compared to the recent CT 3/16/17; mild fatty stranding noted in the right pericolonic gutter and the right groin; no additional hematoma or other significant injury seen. > CT scan of the abdomen and pelvis (4/02/2017) showed redemonstration of prominent hematoma within the right psoas muscle, essentially unchanged as compared to previous study on 3/13/2017; around right psoas muscle there are are some hazy densities, indicating mild reactive or congestive changes similar to previous study; otherwise, there is no definable confluent inflammatory phlegmon around right psoas muscle; there is no evidence of hematoma in left psoas muscle or in rest of the retroperitoneum.    > Coumadin had been discontinued on 3/30/2017   > INR (4/6/2017) = 1.3   > Hgb/Hct (4/7/2017) = 8.8/26.5    > Hgb/Hct (4/10/2017) = 9.0/27.2      > Benign hypertensive heart disease with chronic systolic heart failure   > Decrease Carvedilol from 12.5 mg to 6.25 mg PO BID with meals (8AM, 5PM)     > History of pyelonephritis   > Patient had completed a 5-day treatment course of Aztreonam during his stay at [facility]    > On 4/7/2017, patient was started on Floranex 2 tabs PO BID    > Continue Floranex 2 tabs PO BID     > Type 2 diabetes mellitus with diabetic neuropathy   > On 4/8/2017, decreased Lantus from 24 units SC BID to 25 units SC q HS   > Continue:    > Lantus 25 units SC q HS    > Humalog insulin sliding scale SC TID AC only     > Gout / Hyperuricemia    > Prior to admission to [facility], the patient was on Allopurinol 100 mg PO BID   > Uric acid (4/7/2017) = 3.8   > On 4/7/2017, decreased Allopurinol from 100 mg PO BID to 100 mg PO once daily   > Continue Allopurinol 100 mg PO once daily     > Constipation   > Continue:    > Docusate sodium 100 mg PO once daily after breakfast    > Pericolace 2 tabs PO once daily after dinner   > Discontinue Polyethylene glycol 17 grams in 8 oz water PO once daily q 7PM      > Analgesia   > Continue:    > Acetaminophen 650 mg PO q 4 hr PRN for pain level less than 5/10    > Increase Gabapentin from 300 mg to 400 mg PO BID    > Increase Percocet 5/325 to Percocet 7.5/325 1 tab PO TID (8AM, 12PM, 4PM)    > Increase Percocet 5/325 to Percocet 7.5/325 1 tab PO q 4 hr PRN for pain level greater than 4/10 (from 8PM to 4AM only)   > Warm compress to right thigh/hip PRN      8. Patient's progress in rehabilitation and medical issues discussed with the patient. All questions answered to the best of my ability. Care plan discussed with patient and nurse.       Signed:    Jackie Joyce MD    April 10, 2017

## 2017-04-10 NOTE — ROUTINE PROCESS
SHIFT CHANGE NOTE FOR [facility]    Bedside and Verbal shift change report given to Imelda Webb, RN (oncoming nurse) by Bettye Moser RN   (offgoing nurse). Report included the following information SBAR, Kardex, MAR and Recent Results. Situation:   Code Status: Full Code   Reason for Admission: Psoas Hematoma  Hospital Day: 4   Problem List:   Hospital Problems  Date Reviewed: 4/7/2017          Codes Class Noted POA    History of Coumadin therapy ICD-10-CM: Z79.01  ICD-9-CM: V58.61  Unknown Yes    Overview Signed 4/6/2017 10:35 PM by Merline Fernandez MD     Anticoagulation for chronic atrial fibrillation; Discontinued on 3/30/2017             Decreased calculated glomerular filtration rate (GFR) ICD-10-CM: R94.4  ICD-9-CM: 794.4  3/30/2017 Yes    Overview Signed 4/6/2017  5:47 PM by Merline Fernandez MD     Calculated GFR equivalent to that of CKD stage 3 = 30-59 ml/min             History of pyelonephritis ICD-10-CM: Z87.440  ICD-9-CM: V13.02  3/30/2017 Yes        Iliopsoas muscle hematoma ICD-10-CM: T92.25RJ  ICD-9-CM: 924.00  3/30/2017 Yes        * (Principal)Psoas hematoma, right, secondary to anticoagulant therapy ICD-10-CM: S30. 1XXA  ICD-9-CM: 924.9, E934.2  3/30/2017 Yes        Generalized weakness ICD-10-CM: R53.1  ICD-9-CM: 780.79  3/30/2017 Yes        Impaired mobility and ADLs ICD-10-CM: Z74.09  ICD-9-CM: 799.89  3/30/2017 Yes        Benign hypertensive heart disease with systolic CHF, NYHA class 2 (HCC) (Chronic) ICD-10-CM: I11.0, I50.20  ICD-9-CM: 402.11, 428.20, 428.0  9/5/2012 Yes        Type 2 diabetes mellitus with diabetic neuropathy (HCC) (Chronic) ICD-10-CM: E11.40  ICD-9-CM: 250.60, 357.2  6/28/2011 Yes              Background:   Past Medical History:   Past Medical History:   Diagnosis Date    Benign hypertensive heart disease with systolic CHF, NYHA class 2 ([facility] [address] 9/5/2012    Biventricular implantable cardioverter-defibrillator in situ 04/28/2005    Upgraded to BiV AICD; gen change 4/2008; pocket revision 10/2009; Abdominal - done on 8/22/2012 by Dr. Blackmon Brought Cardiac cath 08/15/1996    Patent coronaries. Elev LVEDP. EF 50-55%.  Cardiac echocardiogram 06/23/2015    Ltd study. EF 45-50%. Mild, diffuse hypk. Severe apical hypk. No mass or thrombus was clearly identified, although imaging was suboptimal.      Cardiac nuclear imaging test 06/19/2015    Fixed distal apical, distal septal defect more likely due to RV pacing than prior infarct. No ischemia. EF 46%. RWMA c/w RV pacing. Nondiagnostic EKG on pharm stress test.      Cardiovascular lower extremity venous duplex 09/04/2012    Acute, non-occlusive DVT in CFV on right. No DVT on left. No superficial thrombosis bilaterally.  Cardiovascular upper extremity venous duplex 08/27/2012    DVT in axillary vein on left. Left subclavian was not visualized.     Chronic anemia 9/5/2012    Chronic systolic heart failure (HCC)     Decreased calculated glomerular filtration rate (GFR) 3/30/2017    Calculated GFR equivalent to that of CKD stage 3 = 30-59 ml/min    Diabetic neuropathy associated with type 2 diabetes mellitus ([address] 6/28/2011    Difficult airway for intubation 08/22/2012    see anesthesia airway note    Dyslipidemia 6/28/2011    Gout     History of complete heart block 6/28/2011    History of Coumadin therapy     Anticoagulation for DVT of the LUE; Discontinued on 3/30/2017    History of deep venous thrombosis 9/5/2012    Left upper extremity    History of pyelonephritis 3/30/2017    Left bundle branch block (LBBB) on electrocardiogram 6/28/2011    Nonischemic cardiomyopathy ([address] 6/28/2011    Obesity (BMI 35.0-39.9 without comorbidity) ([address] 3/13/2017    Obstructive sleep apnea on CPAP 2/7/2012    Psoas hematoma, right, secondary to anticoagulant therapy 3/30/2017    Type 2 diabetes mellitus with diabetic neuropathy ([address] 6/28/2011      Patient taking anticoagulants no    Patient has a defibrillator: yes Assessment:   Changes in Assessment throughout shift: no     Patient has central line: no Reasons if yes: Insertion date: Last dressing date:   Patient has Krueger Cath: no Reasons if yes: Insertion date:     Last Vitals:     Vitals:    04/08/17 2046 04/09/17 0710 04/09/17 1555 04/09/17 2029   BP: 131/59 120/63 115/66 109/60   Pulse: 73 71 87 77   Resp: 18 18 18 18   Temp: 98.5 °F (36.9 °C) 98.3 °F (36.8 °C) 98.7 °F (37.1 °C) 98.5 °F (36.9 °C)   SpO2: 98% 99% 100% 98%   Weight:       Height:            PAIN    Pain Assessment    Pain Intensity 1: 0 (04/10/17 0400) Pain Intensity 1: 2 (12/29/14 1105)    Pain Location 1: Back, Groin Pain Location 1: Abdomen    Pain Intervention(s) 1: Medication (see MAR) Pain Intervention(s) 1: Medication (see MAR)  Patient Stated Pain Goal: 0 Patient Stated Pain Goal: 0  o Intervention effective: no    o Other actions taken for pain: pain medicine     Skin Assessment  Skin color Skin Color: Appropriate for ethnicity  Condition/Temperature Skin Condition/Temp: Warm  Integrity Skin Integrity: Intact  Turgor    Weekly Pressure Ulcer Documentation Weekly Pressure Ulcer Documentation: No Pressure Ulcer Noted-Pressure Ulcer Prevention Initiated  Wound Prevention & Protection Methods  Orientation of wound Orientation of Wound Prevention: Posterior  Location of Prevention Location of Wound Prevention: Sacrum/Coccyx  Dressing Present Dressing Present : No  Dressing Status    Wound Offloading Wound Offloading (Prevention Methods): Bed, pressure redistribution/air     INTAKE/OUPUT    Date 04/09/17 0700 - 04/10/17 0659 04/10/17 0700 - 04/11/17 0659   Shift 7782-7192 1125-5261 24 Hour Total 0700-1859 8203-1632 24 Hour Total   I  N  T  A  K  E   P.O. 480  480         P. O. 480  480       Shift Total  (mL/kg) 480  (4.5)  480  (4.5)      O  U  T  P  U  T   Urine  (mL/kg/hr)            Urine Occurrence(s) 3 x 3 x 6 x       Stool            Stool Occurrence(s) 0 x 0 x 0 x       Shift Total  (mL/kg)           480      Weight (kg) 105.7 105.7 105.7 105.7 105.7 105.7       Recommendations:  1. Patient needs and requests: pain medicine    2. Diet: diabetic    3. Pending tests/procedures: no     4. Functional Level/Equipment: 1 x person assist    5. Estimated Discharge Date: TDB Posted on Whiteboard in Patients Room: [facility] Safety Check    A safety check occurred in the patient's room between off going nurse and oncoming nurse listed above. The safety check included the below items  Area Items   H  High Alert Medications - Verify all high alert medication drips (heparin, PCA, etc.)   E  Equipment - Suction is set up for ALL patients (with vania)  - Red plugs utilized for all equipment (IV pumps, etc.)  - WOWs wiped down at end of shift.  - Room stocked with oxygen, suction, and other unit-specific supplies   A  Alarms - Bed alarm is set for fall risk patients  - Ensure chair alarm is in place and activated if patient is up in a chair   L  Lines - Check IV for any infiltration  - Krueger bag is empty if patient has a Krueger   - Tubing and IV bags are labeled   S  Safety   - Room is clean, patient is clean, and equipment is clean. - Hallways are clear from equipment besides carts. - Fall bracelet on for fall risk patients  - Ensure room is clear and free of clutter  - Suction is set up for ALL patients (with vania)  - Hallways are clear from equipment besides carts.    - Isolation precautions followed, supplies available outside room, sign posted

## 2017-04-10 NOTE — PROGRESS NOTES
Problem: Mobility Impaired (Adult and Pediatric)  Goal: *Acute Goals and Plan of Care (Insert Text)  Physical Therapy Short Goals  Initiated 4/7/2017 and to be accomplished within 7 day(s)  1. Patient will move from supine to sit and sit to supine , scoot up and down and roll side to side in bed with maximal assistance. 2. Patient will transfer from bed to chair and chair to bed with CGA using the least restrictive device. 3. Patient will perform sit to stand with supervision/set-up. 4. Patient will ambulate with minimal assistance/contact guard assist for 25 feet with the least restrictive device.       Physical Therapy Long Goals  Initiated 4/7/2017 and to be accomplished within 21 day(s)  1. Patient will move from supine to sit and sit to supine , scoot up and down and roll side to side in bed with modified independence. 2. Patient will transfer from bed to chair and chair to bed with Mod I using the least restrictive device. 3. Patient will perform sit to stand with modified independence. 4. Patient will ambulate with supervision/set-up for 150 feet with the least restrictive device. 5. Patient will ascend/descend 3 stairs with 1 handrail(s) with minimal assistance/contact guard assist.   PHYSICAL THERAPY DAILY NOTE  Patient [address]  Time In: 0800  Time out: 9348  Patient Seen For: Transfer training; Therapeutic exercise;Patient education; Wheelchair mobility;Balance activities  Diagnosis: Debility  Psoas hematoma, right, secondary to anticoagulant therapy Psoas hematoma, right, secondary to anticoagulant therapy  Precautions: Falls risk     Subjective: Patient reports she has \"kidney pain\" that she has been constantly feeling since yesterday. Nurse gave medication for pain during treatment and aware of patient's significant pain complaints.  Patient also refused continued standing activity in treatment, limiting treatment to seated activity, she reported \" I can't let you push me like this\" when asked to  parallel bars. Patient was educated on the deleterious effects of sedentary behavior as well as the increased risk for contractures in hip flexion and plantar flexion if she does not work on hip extension or dorsiflexion during therapy and throughout the day. Pain at start of tx:9/10 in low back and \"all around\" hips; pain medication received during treatment  Pain at stop of tx: no number give, pain significant and patient refused all standing activity. Patient identified with name and :Yes         Objective: FIM      GROSS ASSESSMENT Daily Assessment     AROM: Grossly decreased, non-functional  PROM: Grossly decreased, non-functional  Strength: Grossly decreased, non-functional  Coordination: Within functional limits  Tone: Normal  Sensation: Intact             TRANSFERS Daily Assessment     Sit to Stand Assistance: Minimal assistance for safety and maximal manual cues for anterior weight shift - patient does not demonstrate; maximal cues for upper extremity placement and keeping heels on the ground to promote dorsiflexion. Stand to sit: min A for safety and maximal cues for upper extremity placement, slowed speed for control, and keeping bilateral heels on ground to promote dorsiflexion. Sit<->stands: decreased weight bearing through right lower extremity despite max cues to correct. Transfer Type: Other: stand step without AD  Transfer Assistance : 4 (Minimal assistance) see sit<->stand for details: patient requires min A for safety and max cues for slowing speed of movement, increasing bilateral hip and knee extension, increasing step clearance, and not furniture crawling. Patient does not demonstrate anything cued for despite max cueing. BALANCE Daily Assessment     Sitting - Static: Good (unsupported) - no sway, supervision  Sitting - Dynamic: Fair (occasional) - patient required contact guard for safety and upper extremity support.    Standing - Static: Poor; participated in three trials of standing with contact guard assistance and bilateral upper extremity support: 44 second, 1 minute 29 seconds, 1 minute 2 seconds. Patient presents with limited hip extension, knee extension and dorsiflexion - max cues to correct but patient did not demonstrate. Standing - Dynamic : Impaired - not fully assessed however patient required min A for transfers and presents with increased trunk sway. WHEELCHAIR MOBILITY Daily Assessment     Able to Propel (ft): 300 feet  Functional Level: 6 mod I  Curbs/Ramps Assist Required (FIM Score): 1 (Total assistance) - NT  Setup - 6 mod I  Wheelchair Management: Manages left brake;Manages right brake          LOWER EXTREMITY EXERCISES Daily Assessment     Extremity: Both  Exercise Type #1: Seated lower extremity strengthening - toe raises to promote active dorsiflexion  Sets Performed: 2  Reps Performed: 20  Level of Assist: Supervision  Extremity: Both  Exercise Type #1: Seated lower extremity strengthening - Glute Sets to promote hip extensor musculature. Sets Performed: 1  Reps Performed: 20  Level of Assist: Supervision; cues for technique and for breathing. Assessment: Patient is presenting with self - limiting behavior in therapy by refusing standing activity. Patient verbalized understanding of benefits of exercise and preventing contractures, however she still did not attempt to participate more. Patient is presenting with decreased active hip flexion, knee flexion, and dorsiflexion, increased pain, and decreased bilateral lower extremity strengthening resulting in deficits in bed mobility tolerance, decreased safety with transfers, and decreased safety, independence, and tolerance with standing. Patient will benefit from continued skilled PT with improved participation and decreased pain.  Patient may benefit from gentle encouragement and continued education on participation and role of PT. Plan of Care: Continue plan of care. Progress with range of motion in right lower extremity in order to improve hip extension, knee extension, and dorsiflexion in seated, supine, or standing as tolerated. Progress with standing tolerance in parallel bars for support in order to promote increased activity tolerance as well as weight bearing through right lower extremity for functional strengthening and improved posture for pre-gait activities.       Anne Emanuel, SPT  4/10/2017

## 2017-04-11 LAB
GLUCOSE BLD STRIP.AUTO-MCNC: 101 MG/DL (ref 70–110)
GLUCOSE BLD STRIP.AUTO-MCNC: 134 MG/DL (ref 70–110)
GLUCOSE BLD STRIP.AUTO-MCNC: 148 MG/DL (ref 70–110)
GLUCOSE BLD STRIP.AUTO-MCNC: 79 MG/DL (ref 70–110)

## 2017-04-11 RX ORDER — CYCLOBENZAPRINE HCL 5 MG
5 TABLET ORAL EVERY 8 HOURS
Status: DISCONTINUED | OUTPATIENT
Start: 2017-04-11 | End: 2017-04-14

## 2017-04-11 RX ADMIN — INSULIN GLARGINE 25 UNITS: 100 INJECTION, SOLUTION SUBCUTANEOUS at 21:39

## 2017-04-11 RX ADMIN — CARVEDILOL 6.25 MG: 3.12 TABLET, FILM COATED ORAL at 07:43

## 2017-04-11 RX ADMIN — LACTOBACILLUS TAB 2 TABLET: TAB at 17:33

## 2017-04-11 RX ADMIN — DOCUSATE SODIUM 100 MG: 100 CAPSULE, LIQUID FILLED ORAL at 09:32

## 2017-04-11 RX ADMIN — STANDARDIZED SENNA CONCENTRATE AND DOCUSATE SODIUM 2 TABLET: 8.6; 5 TABLET, FILM COATED ORAL at 17:33

## 2017-04-11 RX ADMIN — LACTOBACILLUS TAB 2 TABLET: TAB at 09:32

## 2017-04-11 RX ADMIN — OXYCODONE HYDROCHLORIDE AND ACETAMINOPHEN 1 TABLET: 7.5; 325 TABLET ORAL at 00:40

## 2017-04-11 RX ADMIN — CYCLOBENZAPRINE HYDROCHLORIDE 5 MG: 5 TABLET, FILM COATED ORAL at 21:40

## 2017-04-11 RX ADMIN — OXYCODONE HYDROCHLORIDE AND ACETAMINOPHEN 1 TABLET: 7.5; 325 TABLET ORAL at 07:43

## 2017-04-11 RX ADMIN — OXYCODONE HYDROCHLORIDE AND ACETAMINOPHEN 1 TABLET: 7.5; 325 TABLET ORAL at 20:08

## 2017-04-11 RX ADMIN — ALLOPURINOL 100 MG: 100 TABLET ORAL at 09:32

## 2017-04-11 RX ADMIN — BISACODYL 10 MG: 5 TABLET, COATED ORAL at 13:45

## 2017-04-11 RX ADMIN — OXYCODONE HYDROCHLORIDE AND ACETAMINOPHEN 1 TABLET: 7.5; 325 TABLET ORAL at 15:48

## 2017-04-11 RX ADMIN — FOLIC ACID 1 MG: 1 TABLET ORAL at 09:32

## 2017-04-11 RX ADMIN — OXYCODONE HYDROCHLORIDE AND ACETAMINOPHEN 1 TABLET: 7.5; 325 TABLET ORAL at 11:50

## 2017-04-11 RX ADMIN — OXYCODONE HYDROCHLORIDE AND ACETAMINOPHEN 1 TABLET: 7.5; 325 TABLET ORAL at 04:23

## 2017-04-11 RX ADMIN — CYCLOBENZAPRINE HYDROCHLORIDE 5 MG: 5 TABLET, FILM COATED ORAL at 15:48

## 2017-04-11 RX ADMIN — GABAPENTIN 400 MG: 400 CAPSULE ORAL at 11:49

## 2017-04-11 RX ADMIN — GABAPENTIN 400 MG: 400 CAPSULE ORAL at 17:33

## 2017-04-11 NOTE — ROUTINE PROCESS
[facility]    Bedside and Verbal shift change report given to Haider Corrales RN (oncoming nurse) by Channing Contreras RN   (offgoing nurse). Report included the following information SBAR, Kardex, MAR and Recent Results. Situation:   Code Status: Full Code   Reason for Admission: Psoas Hematoma  Hospital Day: 5   Problem List:   Hospital Problems  Date Reviewed: 4/10/2017          Codes Class Noted POA    History of Coumadin therapy ICD-10-CM: Z79.01  ICD-9-CM: V58.61  Unknown Yes    Overview Signed 4/6/2017 10:35 PM by Pearlean Felty, MD     Anticoagulation for chronic atrial fibrillation; Discontinued on 3/30/2017             Decreased calculated glomerular filtration rate (GFR) ICD-10-CM: R94.4  ICD-9-CM: 794.4  3/30/2017 Yes    Overview Signed 4/6/2017  5:47 PM by Pearlean Felty, MD     Calculated GFR equivalent to that of CKD stage 3 = 30-59 ml/min             History of pyelonephritis ICD-10-CM: Z87.440  ICD-9-CM: V13.02  3/30/2017 Yes        Iliopsoas muscle hematoma ICD-10-CM: M28.06VH  ICD-9-CM: 924.00  3/30/2017 Yes        * (Principal)Psoas hematoma, right, secondary to anticoagulant therapy ICD-10-CM: S30. 1XXA  ICD-9-CM: 924.9, E934.2  3/30/2017 Yes        Generalized weakness ICD-10-CM: R53.1  ICD-9-CM: 780.79  3/30/2017 Yes        Impaired mobility and ADLs ICD-10-CM: Z74.09  ICD-9-CM: 799.89  3/30/2017 Yes        Benign hypertensive heart disease with systolic CHF, NYHA class 2 (HCC) (Chronic) ICD-10-CM: I11.0, I50.20  ICD-9-CM: 402.11, 428.20, 428.0  9/5/2012 Yes        Type 2 diabetes mellitus with diabetic neuropathy (HCC) (Chronic) ICD-10-CM: E11.40  ICD-9-CM: 250.60, 357.2  6/28/2011 Yes              Background:   Past Medical History:   Past Medical History:   Diagnosis Date    Benign hypertensive heart disease with systolic CHF, NYHA class 2 ([address] 9/5/2012    Biventricular implantable cardioverter-defibrillator in situ 04/28/2005    Upgraded to BiV AICD; gen change 4/2008; pocket revision 10/2009; Abdominal - done on 8/22/2012 by Dr. Liu Other Cardiac cath 08/15/1996    Patent coronaries. Elev LVEDP. EF 50-55%.  Cardiac echocardiogram 06/23/2015    Ltd study. EF 45-50%. Mild, diffuse hypk. Severe apical hypk. No mass or thrombus was clearly identified, although imaging was suboptimal.      Cardiac nuclear imaging test 06/19/2015    Fixed distal apical, distal septal defect more likely due to RV pacing than prior infarct. No ischemia. EF 46%. RWMA c/w RV pacing. Nondiagnostic EKG on pharm stress test.      Cardiovascular lower extremity venous duplex 09/04/2012    Acute, non-occlusive DVT in CFV on right. No DVT on left. No superficial thrombosis bilaterally.  Cardiovascular upper extremity venous duplex 08/27/2012    DVT in axillary vein on left. Left subclavian was not visualized.     Chronic anemia 9/5/2012    Chronic systolic heart failure (HCC)     Decreased calculated glomerular filtration rate (GFR) 3/30/2017    Calculated GFR equivalent to that of CKD stage 3 = 30-59 ml/min    Diabetic neuropathy associated with type 2 diabetes mellitus ([address] 6/28/2011    Difficult airway for intubation 08/22/2012    see anesthesia airway note    Dyslipidemia 6/28/2011    Gout     History of complete heart block 6/28/2011    History of Coumadin therapy     Anticoagulation for DVT of the LUE; Discontinued on 3/30/2017    History of deep venous thrombosis 9/5/2012    Left upper extremity    History of pyelonephritis 3/30/2017    Left bundle branch block (LBBB) on electrocardiogram 6/28/2011    Nonischemic cardiomyopathy ([address] 6/28/2011    Obesity (BMI 35.0-39.9 without comorbidity) ([address] 3/13/2017    Obstructive sleep apnea on CPAP 2/7/2012    Psoas hematoma, right, secondary to anticoagulant therapy 3/30/2017    Type 2 diabetes mellitus with diabetic neuropathy ([address] 6/28/2011      Patient taking anticoagulants no    Patient has a defibrillator: yes Assessment:   Changes in Assessment throughout shift: no     Patient has central line: no Reasons if yes: Insertion date: Last dressing date:   Patient has Krueger Cath: no Reasons if yes: Insertion date:     Last Vitals:     Vitals:    04/09/17 2029 04/10/17 0751 04/10/17 1608 04/10/17 2000   BP: 109/60 111/61 117/66 120/62   Pulse: 77 76 78 72   Resp: 18 18 20 18   Temp: 98.5 °F (36.9 °C) 98 °F (36.7 °C) 97.9 °F (36.6 °C) 97.6 °F (36.4 °C)   SpO2: 98% 99% 99% 100%   Weight:       Height:            PAIN    Pain Assessment    Pain Intensity 1: 4 (04/11/17 0500) Pain Intensity 1: 2 (12/29/14 1105)    Pain Location 1: Groin, Hip Pain Location 1: Abdomen    Pain Intervention(s) 1: Medication (see MAR) Pain Intervention(s) 1: Medication (see MAR)  Patient Stated Pain Goal: 4 Patient Stated Pain Goal: 0  o Intervention effective: no    o Other actions taken for pain: pain medicine     Skin Assessment  Skin color Skin Color: Appropriate for ethnicity  Condition/Temperature Skin Condition/Temp: Dry, Warm  Integrity Skin Integrity: Intact  Turgor    Weekly Pressure Ulcer Documentation Weekly Pressure Ulcer Documentation: No Pressure Ulcer Noted-Pressure Ulcer Prevention Initiated  Wound Prevention & Protection Methods  Orientation of wound Orientation of Wound Prevention: Posterior  Location of Prevention Location of Wound Prevention: Sacrum/Coccyx  Dressing Present Dressing Present : No  Dressing Status    Wound Offloading Wound Offloading (Prevention Methods): Bed, pressure redistribution/air, Chair cushion, Pillows, Wheelchair     INTAKE/OUPUT    Date 04/10/17 0700 - 04/11/17 0659 04/11/17 0700 - 04/12/17 0659   Shift 0700-1859 1900-0659 24 Hour Total 0700-1859 1900-0659 24 Hour Total   I  N  T  A  K  E   P.O. 660  660         P. O. 660  660       Shift Total  (mL/kg) 660  (6.2)  660  (6.2)      O  U  T  P  U  T   Urine  (mL/kg/hr) 200  (0.2)  200         Urine Voided 200  200         Urine Occurrence(s) 3 x 3 x 6 x       Stool            Stool Occurrence(s) 0 x 0 x 0 x       Shift Total  (mL/kg) 200  (1.9)  200  (1.9)        460      Weight (kg) 105.7 105.7 105.7 105.7 105.7 105.7       Recommendations:  1. Patient needs and requests: pain medicine    2. Diet: diabetic    3. Pending tests/procedures: no     4. Functional Level/Equipment: 1 x person assist    5. Estimated Discharge Date: TDB Posted on Whiteboard in Patients Room: [facility] Safety Check    A safety check occurred in the patient's room between off going nurse and oncoming nurse listed above. The safety check included the below items  Area Items   H  High Alert Medications - Verify all high alert medication drips (heparin, PCA, etc.)   E  Equipment - Suction is set up for ALL patients (with vania)  - Red plugs utilized for all equipment (IV pumps, etc.)  - WOWs wiped down at end of shift.  - Room stocked with oxygen, suction, and other unit-specific supplies   A  Alarms - Bed alarm is set for fall risk patients  - Ensure chair alarm is in place and activated if patient is up in a chair   L  Lines - Check IV for any infiltration  - Krueger bag is empty if patient has a Krueger   - Tubing and IV bags are labeled   S  Safety   - Room is clean, patient is clean, and equipment is clean. - Hallways are clear from equipment besides carts. - Fall bracelet on for fall risk patients  - Ensure room is clear and free of clutter  - Suction is set up for ALL patients (with vania)  - Hallways are clear from equipment besides carts.    - Isolation precautions followed, supplies available outside room, sign posted

## 2017-04-11 NOTE — PROGRESS NOTES
Problem: Self Care Deficits Care Plan (Adult)  Goal: *Therapy Goal (Edit Goal, Insert Text)  Long Term Goals (to be met upon discharge date) in order to increase pts functional independence and safety, and decrease burden of care:  1. Pt will perform grooming with independence. 2. Pt will perform UB bathing with modified independence. 3. Pt will perform LB bathing with modified independence. 4. Pt will perform shower transfer with modified independence. 5. Pt will perform UB dressing with independence. 6. Pt will perform LB dressing with modified independence. 7. Pt will perform toileting task with modified independence. 8. Pt will perform toilet transfer with modified independence. 9. Pt will perform an IADL task while standing with modified independence. Short Term Weekly Goals for (17 to 17) in order to increase pts functional independence and safety, and decrease burden of care:  1. Pt will perform grooming with independence. 2. Pt will perform UB bathing with modified independence. 3. Pt will perform LB bathing with supervision. 4. Pt will perform shower transfer with supervision. 5. Pt will perform UB dressing with independence. 6. Pt will perform LB dressing with supervision. 7. Pt will perform toileting task with supervision. 8. Pt will perform toilet transfer with supervision. OCCUPATIONAL THERAPY DAILY NOTE  Patient Name:Debby Gonzalez  Time Spent With Patient  Time In: 0800  Time Out: 0900      Time In: 1430  Time Out: 1500     Medical Diagnosis:  Debility  Psoas hematoma, right, secondary to anticoagulant therapy Psoas hematoma, right, secondary to anticoagulant therapy      Pain at start of tx: no score given for pain  Pain at stop of tx: 8-9/10 in R leg     Patient identified with name and : yes  Subjective: Pt reported she has a shower chair at home and a small threshold to step over to get into the shower. Pt also reported there is a door to the shower she has to open. Pt reported she was doing all the cooking before she came here. Objective: Pt performed ADL in first session. IADL Daily Assessment     Pt folded laundry while standing for 1:43 and 1:42 with a RW and supervision. The patient had a forward flexed posture while standing and was cued to stand straight up and put her R heel on the ground. Pt was able to stand with her R heel on the ground, but continued to be flex forward. Pt completed the rest of the task while seated EOC due to pain and decreased standing tolerance. Pt had to reach anterior and to the R to put the folded clothes into the basket focusing on increased weight baring on the R leg. FEEDING/EATING Daily Assessment     Feeding/Eating  Feeding/Eating Assistance: 7 (Independent)       GROOMING Daily Assessment     Grooming  Grooming Assistance : 5 (Supervision)  Comments: Pt reported she performed grooming with nurse this morning with setup. UPPER BODY BATHING Daily Assessment     Upper Body Bathing  Bathing Assistance, Upper: 5 (Supervision)  Position Performed: Seated in chair  Adaptive Equipment: Shower chair  Comments: Pt performed all UB bathing while seated on shower chair with supervision. Pt set up her own washcloth with soap and set up her own water in the shower. LOWER BODY BATHING Daily Assessment     Lower Body Bathing  Bathing Assistance, Lower : 5 (Supervision)  Position Performed: Seated in chair  Adaptive Equipment: Shower chair  Comments: Pt washed B legs, feet, linnette area, and buttocks while seated on a shower chair with supervision. Pt used crossing over method to wash L leg and foot and attempted with R leg, but was unable to due to pain and stiffness in R hip. Pt was able to bend over to wash R leg and foot. Pt leaned to the L and R to wash buttocks instead of standing for safety.        UPPER BODY DRESSING Daily Assessment     Upper Body Dressing   Dressing Assistance : 5 (Supervision)  Comments: Pt doffed pullover shirt while seated and donned pullover shirt and sports bra while seated with setup and extra time. LOWER BODY DRESSING Daily Assessment     Lower Body Dressing   Dressing Assistance : 5 (Supervision)  Leg Crossed Method Used: Yes  Position Performed: Seated in chair;Standing  Adaptive Equipment Used: Long handled shoe horn;Sock aid  Comments: Pt doffed pants and underwear while standing and B socks while seated by bending over. Pt donned underwear and pants onto legs while seated and stood to pull over waist with 3 attempts of standing to complete. Pt donned L sock and shoe using crossover method and used sockaid to diane R sock and long handled shoe horn to diane R shoe. Extra time and breaks required to complete donning tasks. MOBILITY/TRANSFERS Daily Assessment     Functional Transfers  Tub or Shower Type: Shower  Amount of Assistance Required: 5 (Supervision/setup)  Adaptive Equipment: Walker (comment); Shower chair with back   Pt ambulated from w/c in room to shower chair with a RW. Pt had to step over a towel threshold and was able to clear it with her R leg, but had difficulty then clearing the threshold with her L leg. Assessment: Pt demonstrated increased independence with ADLs and functional transfers. Pt continues to be limited by pain and stiffness in R hip, but was able to complete tasks with ADs and extra time. Pt demonstrate decreased motivated causing more difficulty completing dynamic standing tasks. Plan of Care: Continue with POC to increase independence with I/ADLs and functional transfers.      MARKO Lorenzana  4/11/2017

## 2017-04-11 NOTE — ROUTINE PROCESS
[facility]    Bedside and Verbal shift change report given to Tyler Webster RN (oncoming nurse) by Alexis Lopez RN   (offgoing nurse). Report included the following information SBAR, Kardex, MAR and Recent Results. Situation:   Code Status: Full Code   Reason for Admission: Psoas Hematoma  Hospital Day: 5   Problem List:   Hospital Problems  Date Reviewed: 4/11/2017          Codes Class Noted POA    History of Coumadin therapy ICD-10-CM: Z79.01  ICD-9-CM: V58.61  Unknown Yes    Overview Signed 4/6/2017 10:35 PM by Aleksandar Concepcion MD     Anticoagulation for chronic atrial fibrillation; Discontinued on 3/30/2017             Decreased calculated glomerular filtration rate (GFR) ICD-10-CM: R94.4  ICD-9-CM: 794.4  3/30/2017 Yes    Overview Signed 4/6/2017  5:47 PM by Aleksandar Concepcion MD     Calculated GFR equivalent to that of CKD stage 3 = 30-59 ml/min             History of pyelonephritis ICD-10-CM: Z87.440  ICD-9-CM: V13.02  3/30/2017 Yes        Iliopsoas muscle hematoma ICD-10-CM: R70.84MN  ICD-9-CM: 924.00  3/30/2017 Yes        * (Principal)Psoas hematoma, right, secondary to anticoagulant therapy ICD-10-CM: S30. 1XXA  ICD-9-CM: 924.9, E934.2  3/30/2017 Yes        Generalized weakness ICD-10-CM: R53.1  ICD-9-CM: 780.79  3/30/2017 Yes        Impaired mobility and ADLs ICD-10-CM: Z74.09  ICD-9-CM: 799.89  3/30/2017 Yes        Benign hypertensive heart disease with systolic CHF, NYHA class 2 (HCC) (Chronic) ICD-10-CM: I11.0, I50.20  ICD-9-CM: 402.11, 428.20, 428.0  9/5/2012 Yes        Type 2 diabetes mellitus with diabetic neuropathy (HCC) (Chronic) ICD-10-CM: E11.40  ICD-9-CM: 250.60, 357.2  6/28/2011 Yes              Background:   Past Medical History:   Past Medical History:   Diagnosis Date    Benign hypertensive heart disease with systolic CHF, NYHA class 2 ([address] [address] [address] 9/5/2012    Biventricular implantable cardioverter-defibrillator in situ 04/28/2005    Upgraded to BiV AICD; gen change 4/2008; pocket revision 10/2009; Abdominal - done on 8/22/2012 by Dr. Ayleen Lopez Cardiac cath 08/15/1996    Patent coronaries. Elev LVEDP. EF 50-55%.  Cardiac echocardiogram 06/23/2015    Ltd study. EF 45-50%. Mild, diffuse hypk. Severe apical hypk. No mass or thrombus was clearly identified, although imaging was suboptimal.      Cardiac nuclear imaging test 06/19/2015    Fixed distal apical, distal septal defect more likely due to RV pacing than prior infarct. No ischemia. EF 46%. RWMA c/w RV pacing. Nondiagnostic EKG on pharm stress test.      Cardiovascular lower extremity venous duplex 09/04/2012    Acute, non-occlusive DVT in CFV on right. No DVT on left. No superficial thrombosis bilaterally.  Cardiovascular upper extremity venous duplex 08/27/2012    DVT in axillary vein on left. Left subclavian was not visualized.     Chronic anemia 9/5/2012    Chronic systolic heart failure (HCC)     Decreased calculated glomerular filtration rate (GFR) 3/30/2017    Calculated GFR equivalent to that of CKD stage 3 = 30-59 ml/min    Diabetic neuropathy associated with type 2 diabetes mellitus ([address] 6/28/2011    Difficult airway for intubation 08/22/2012    see anesthesia airway note    Dyslipidemia 6/28/2011    Gout     History of complete heart block 6/28/2011    History of Coumadin therapy     Anticoagulation for DVT of the LUE; Discontinued on 3/30/2017    History of deep venous thrombosis 9/5/2012    Left upper extremity    History of pyelonephritis 3/30/2017    Left bundle branch block (LBBB) on electrocardiogram 6/28/2011    Nonischemic cardiomyopathy ([address] 6/28/2011    Obesity (BMI 35.0-39.9 without comorbidity) ([address] 3/13/2017    Obstructive sleep apnea on CPAP 2/7/2012    Psoas hematoma, right, secondary to anticoagulant therapy 3/30/2017    Type 2 diabetes mellitus with diabetic neuropathy ([address] 6/28/2011      Patient taking anticoagulants no    Patient has a defibrillator: yes     Assessment:   Changes in Assessment throughout shift: no     Patient has central line: no Reasons if yes: Insertion date: Last dressing date:   Patient has Krueger Cath: no Reasons if yes: Insertion date:     Last Vitals:     Vitals:    04/10/17 1608 04/10/17 2000 04/11/17 0739 04/11/17 1537   BP: 117/66 120/62 127/64 95/50   Pulse: 78 72 79 76   Resp: 20 18 18 18   Temp: 97.9 °F (36.6 °C) 97.6 °F (36.4 °C) 98 °F (36.7 °C) 97.9 °F (36.6 °C)   SpO2: 99% 100% 98% 96%   Weight:       Height:            PAIN    Pain Assessment    Pain Intensity 1: 8 (04/11/17 1557) Pain Intensity 1: 2 (12/29/14 1105)    Pain Location 1: Groin, Hip Pain Location 1: Abdomen    Pain Intervention(s) 1: Medication (see MAR) Pain Intervention(s) 1: Medication (see MAR)  Patient Stated Pain Goal: 0 Patient Stated Pain Goal: 0  o Intervention effective: no    o Other actions taken for pain: pain medicine     Skin Assessment  Skin color Skin Color: Appropriate for ethnicity  Condition/Temperature Skin Condition/Temp: Warm  Integrity Skin Integrity: Intact  Turgor    Weekly Pressure Ulcer Documentation Weekly Pressure Ulcer Documentation: No Pressure Ulcer Noted-Pressure Ulcer Prevention Initiated  Wound Prevention & Protection Methods  Orientation of wound Orientation of Wound Prevention: Posterior  Location of Prevention Location of Wound Prevention: Sacrum/Coccyx  Dressing Present Dressing Present : No  Dressing Status    Wound Offloading Wound Offloading (Prevention Methods): Bed, pressure redistribution/air     INTAKE/OUPUT    Date 04/10/17 0700 - 04/11/17 0659 04/11/17 0700 - 04/12/17 0659   Shift 0700-1859 1900-0659 24 Hour Total 0700-1859 1900-0659 24 Hour Total   I  N  T  A  K  E   P.O. 660  660 480  480      P. O. 660  660 480  480    Shift Total  (mL/kg) 660  (6.2)  660  (6.2) 480  (4.5)  480  (4.5)   O  U  T  P  U  T   Urine  (mL/kg/hr) 200  (0.2)  200  (0.1)         Urine Voided 200  200         Urine Occurrence(s) 3 x 3 x 6 x 3 x  3 x    Stool            Stool Occurrence(s) 0 x 0 x 0 x 0 x  0 x    Shift Total  (mL/kg) 200  (1.9)  200  (1.9)        460 480  480   Weight (kg) 105.7 105.7 105.7 105.7 105.7 105.7       Recommendations:  1. Patient needs and requests: pain medicine    2. Diet: diabetic    3. Pending tests/procedures: no     4. Functional Level/Equipment: 1 x person assist    5. Estimated Discharge Date: TDB Posted on Whiteboard in Patients Room: no     [facility] Safety Check    A safety check occurred in the patient's room between off going nurse and oncoming nurse listed above. The safety check included the below items  Area Items   H  High Alert Medications - Verify all high alert medication drips (heparin, PCA, etc.)   E  Equipment - Suction is set up for ALL patients (with vania)  - Red plugs utilized for all equipment (IV pumps, etc.)  - WOWs wiped down at end of shift.  - Room stocked with oxygen, suction, and other unit-specific supplies   A  Alarms - Bed alarm is set for fall risk patients  - Ensure chair alarm is in place and activated if patient is up in a chair   L  Lines - Check IV for any infiltration  - Krueger bag is empty if patient has a Krueger   - Tubing and IV bags are labeled   S  Safety   - Room is clean, patient is clean, and equipment is clean. - Hallways are clear from equipment besides carts. - Fall bracelet on for fall risk patients  - Ensure room is clear and free of clutter  - Suction is set up for ALL patients (with vania)  - Hallways are clear from equipment besides carts.    - Isolation precautions followed, supplies available outside room, sign posted

## 2017-04-11 NOTE — PROGRESS NOTES
Problem: Mobility Impaired (Adult and Pediatric)  Goal: *Acute Goals and Plan of Care (Insert Text)  Physical Therapy Short Goals  Initiated 4/7/2017 and to be accomplished within 7 day(s)  1. Patient will move from supine to sit and sit to supine , scoot up and down and roll side to side in bed with maximal assistance. 2. Patient will transfer from bed to chair and chair to bed with CGA using the least restrictive device. 3. Patient will perform sit to stand with supervision/set-up. 4. Patient will ambulate with minimal assistance/contact guard assist for 25 feet with the least restrictive device.       Physical Therapy Long Goals  Initiated 4/7/2017 and to be accomplished within 21 day(s)  1. Patient will move from supine to sit and sit to supine , scoot up and down and roll side to side in bed with modified independence. 2. Patient will transfer from bed to chair and chair to bed with Mod I using the least restrictive device. 3. Patient will perform sit to stand with modified independence. 4. Patient will ambulate with supervision/set-up for 150 feet with the least restrictive device. 5. Patient will ascend/descend 3 stairs with 1 handrail(s) with minimal assistance/contact guard assist.   PHYSICAL THERAPY DAILY NOTE  Patient Name:Debby Parrt  Time In: 1000  Time Out: 1100  Patient Seen For: Gait training;Patient education; Therapeutic exercise;Transfer training; Wheelchair mobility  Diagnosis: Debility  Psoas hematoma, right, secondary to anticoagulant therapy Psoas hematoma, right, secondary to anticoagulant therapy  Precautions: Falls risk      Subjective: Patient reports she tried to lower the head of her bed every 15 minutes yesterday while relaxing however, she was unable to move it lower more than a few times due to intense pain in low back and front of right hip. Patient educated on continuing to practice this on her own to promote hip extension and prevent a contracture.       Pain at start of tx:9/10 in right hip - anterior; reports pain medication given prior to treatment   Pain at stop of tx:9/10 in right hip - educated on breathing to relax    Time In: 1502  Time Out: 1532  Patient Seen For: group activity, transfers, balance training   Pain: 8/10 in right hip, educated on breathing techniques to relax. Patient identified with name and :Yes         Objective: FIM      GROSS ASSESSMENT Daily Assessment     AROM: Generally decreased, functional  PROM: Generally decreased, functional  Strength: Generally decreased, functional  Coordination: Within functional limits  Tone: Normal  Sensation: Intact          BED/MAT MOBILITY Daily Assessment     Rolling Right : 5 (Supervision)   Supine to Sit : 5 (Supervision)  Sit to Supine : 5 (Supervision)    All bed mobility limited by inability to lie supine - patients bed was elevated to 50 degrees - attempted to lower to promote hip extension however patient did not tolerated; when in supine with head of bed elevated, therapist attempted to mobilize right lower extremity off side of bed to promote hip extension however patient did not tolerated. Patient was able to complete this modified bed mobility with supervision for safety from therapist, however she did use bed rails and self mobilization of bilateral lower extremities on and off the bed by her upper extremities. TRANSFERS Daily Assessment     Sit to Stand Assistance: Contact guard assistance for safety and manual cues for increased anterior weight shift - patient did not demonstrate despite max cues. patient also stood on right toe - minimal weight bearing through right lower extremity despite max cueing. Stand to sit: contact guard for safety and max cues for upper extremity placement as well as decreased speed for safety (able to demonstrate towards end of treatment today) and keeping right foot dorsiflexed to put weight through heel - patient did not demonstrate.    Transfer Type: Other  Other: stand step with rolling walker  Transfer Assistance : 4 (Contact guard assistance) see sit<->stands for details; max cues for bilateral hip extension and trunk extension - patient did not demonstrate, cues for keeping walker close to promote hip and trunk extension but patient did not demonstrates; cues for keeping right heel down during stance to promote increased weight bearing - minimal demonstration by patient. GAIT Daily Assessment     Amount of Assistance: 4 (Contact guard assistance) for safety; max cues for keeping walker close and increasing weight bearing through right lower extremity, patient presents with decreased velocity, decreased right lower extremity weight bearing, decreased and uneven step length (left shorter) - cues to decrease right to equalize step length. Patient also has decreased step clearance bilaterally - cues to increased for safety. Patient's gait was similar indoor and outdoor. Distance (ft): 25 Feet (ft) outdoor level surface pavement + two trials of 6ft indoor level surfaces in room. Assistive Device: Walker, rolling           BALANCE Daily Assessment     Sitting - Static: Good (unsupported) - no sway, supervision   Sitting - Dynamic: Good (unsupported) - no sway with head turn, supervision   Standing - Static: Fair - contact guard; limited right lower extremity weight bearing - increased bilateral upper extremity support   Standing - Dynamic : Impaired - able to participate in standing function overhead reaching activity - used right upper extremity to reach and left for support on rolling walker to promote increased right hip extension. Patient was able to complete 3 trials of standing for reaching up 4 times to clip close pins. Patient completed standing functional unilateral throwing activity with unilateral upper extremity support for ~ 4 trials and standing for 1 -3 minutes each time.   Close supervision for safety and cues to keep heels on ground and increasing hip extension - patient able to demonstrate right dorsiflexion intermittently with maximal verbal cues. WHEELCHAIR MOBILITY Daily Assessment     Able to Propel (ft): 300 feet  Functional Level: 6  Curbs/Ramps Assist Required (FIM Score): 1 (Total assistance)  Wheelchair Setup Assist Required : 6 (Modified independent)  Wheelchair Management: Manages left brake;Manages right brake; propels with bilateral upper extremities            Assessment: Patient is presenting with increased participation in physical therapy today as evident by ability to complete 3 trials of standing and reaching activity - progressed to ~4 trials during afternoon session with close supervision instead of contact guard, as well as the ability to ambulate today. Patient has decreased lower extremity strength, however most of her functional mobility deficits and decreased activity tolerance are most likely limited by pain tolerance. Functional mobility deficits include decreased independence with bed mobility, transfers, ambulation, and balance activities. Patient will benefit from continued skilled PT in order to maximize function and safety. Plan of Care: Continue plan of care; progress with standing overhead reaching activities to promote activity tolerance and increased right hip extension and weight bearing. Progress with supine activity to promote increased hip extension. Progress ambulation and step training as tolerated to promote lower extremity strengthening, increased activity tolerance, and safety upon discharge.       Anne Emanuel, SPT  4/11/2017

## 2017-04-11 NOTE — PROGRESS NOTES
[facility] PHYSICAL REHABILITATION  [address] [place] [address], [address]     INPATIENT REHABILITATION  DAILY PROGRESS NOTE     Date: 4/11/2017    Name: Abhay Garcia Age / Sex: 77 y.o. / female   CSN: [id number] MRN: [id number]   [facility] Date: 4/6/2017 Length of Stay: 5 days     Primary Rehab Diagnosis: Impaired Mobility and ADLs secondary to Right iliopsoas muscle hematoma, secondary to supratherapeutic INR/anticoagulant therapy      Subjective:     Patient seen and examined. Blood pressure controlled. Blood sugars controlled. Patient's Complaint:   No significant medical complaints     Pain Control: ongoing significant pain in which is stable and controlled by current meds      Objective:     Vital Signs:  Patient Vitals for the past 24 hrs:   BP Temp Pulse Resp SpO2   04/11/17 0739 127/64 98 °F (36.7 °C) 79 18 98 %   04/10/17 2000 120/62 97.6 °F (36.4 °C) 72 18 100 %   04/10/17 1608 117/66 97.9 °F (36.6 °C) 78 20 99 %        Physical Exam:  GENERAL SURVEY: Patient is awake, alert, oriented x 3, sitting comfortably on the chair, not in acute respiratory distress. HEENT: pink palpebral conjunctivae, anicteric sclerae, no nasoaural discharge, moist oral mucosa  NECK: supple, no jugular venous distention, no palpable lymph nodes  CHEST/LUNGS: symmetrical chest expansion, good air entry, clear breath sounds  HEART: adynamic precordium, good S1 S2, no S3, regular rhythm, no murmurs  ABDOMEN: obese, bowel sounds appreciated, soft, non-tender  EXTREMITIES: pink nailbeds, no edema, full and equal pulses, no calf tenderness   NEUROLOGICAL EXAM: The patient is awake, alert and oriented x3, able to answer questions fairly appropriately, able to follow 1 and 2 step commands. Able to tell time from the wall clock. Cranial nerves II-XII are grossly intact. No gross sensory deficit.  Motor strength is 4/5 on BUE, 4-/5 on the left hip, 4+/5 on the left knee and left ankle, 2+/5 on the right hip (due to pain), 3+/5 on the right knee (due to right hip pain), 4/5 on the right ankle. Current Medications:  Current Facility-Administered Medications   Medication Dose Route Frequency    carvedilol (COREG) tablet 6.25 mg  6.25 mg Oral BID WITH MEALS    oxyCODONE-acetaminophen (PERCOCET 7.5) 7.5-325 mg per tablet 1 Tab  1 Tab Oral TID    oxyCODONE-acetaminophen (PERCOCET 7.5) 7.5-325 mg per tablet 1 Tab  1 Tab Oral Q4H PRN    gabapentin (NEURONTIN) capsule 400 mg  400 mg Oral BID    ondansetron hcl (ZOFRAN) tablet 4 mg  4 mg Oral TID PRN    allopurinol (ZYLOPRIM) tablet 100 mg  100 mg Oral DAILY    docusate sodium (COLACE) capsule 100 mg  100 mg Oral DAILY AFTER BREAKFAST    senna-docusate (PERICOLACE) 8.6-50 mg per tablet 2 Tab  2 Tab Oral PCD    insulin glargine (LANTUS) injection 25 Units  25 Units SubCUTAneous QHS    acetaminophen (TYLENOL) tablet 650 mg  650 mg Oral Q4H PRN    bisacodyl (DULCOLAX) tablet 10 mg  10 mg Oral Q48H PRN    insulin lispro (HUMALOG) injection   SubCUTAneous TIDAC    folic acid (FOLVITE) tablet 1 mg  1 mg Oral DAILY    glucose chewable tablet 16 g  4 Tab Oral PRN    glucagon (GLUCAGEN) injection 1 mg  1 mg IntraMUSCular PRN    dextrose (D50W) injection syrg 12.5-25 g  25-50 mL IntraVENous PRN    Lactobacillus Acidoph & Bulgar (FLORANEX) tablet 2 Tab  2 Tab Oral BID       Allergies:   Allergies   Allergen Reactions    Vancomycin Itching    Ampicillin Itching    Bactrim [Sulfamethoxazole-Trimethoprim] Unknown (comments)    Blueberry Swelling     Causes throat swelling    Ciprofloxacin Itching    Codeine Other (comments)     Jumpy feeling    Crestor [Rosuvastatin] Itching    Darvocet A500 [Propoxyphene N-Acetaminophen] Itching    Demerol [Meperidine] Itching    Levaquin [Levofloxacin] Itching    Lipitor [Atorvastatin] Myalgia    Magnesium Oxide Itching     nausea    Minocin [Minocycline] Unknown (comments)    Pcn [Penicillins] Itching    Pravachol [Pravastatin] Swelling Swelling in mouth     Sulfa (Sulfonamide Antibiotics) Itching    Ultracet [Tramadol-Acetaminophen] Itching    Vicodin [Hydrocodone-Acetaminophen] Unknown (comments)    Vytorin 10-10 [Ezetimibe-Simvastatin] Myalgia    Percodan [Oxycodone Hcl-Oxycodone-Asa] Itching       Functional Progress:    OCCUPATIONAL THERAPY    ON ADMISSION MOST RECENT   Eating  Functional Level: 7   Eating  Functional Level: 7     Grooming  Functional Level: 5   Grooming  Functional Level: 5     Bathing  Functional Level: 3   Bathing  Functional Level: 5     Upper Body Dressing  Functional Level: 5   Upper Body Dressing  Functional Level: 5     Lower Body Dressing  Functional Level: 3   Lower Body Dressing  Functional Level: 5     Toileting  Functional Level: 4   Toileting  Functional Level: 5     Toilet Transfers  Toilet Transfer Score: 4   Toilet Transfers  Toilet Transfer Score: 5     Tub /Shower Transfers  Tub/Shower Transfer Score: 0   Tub/Shower Transfers  Tub/Shower Transfer Score: 5       Legend:   7 - Independent   6 - Modified Independent   5 - Standby Assistance / Supervision / Set-up   4 - Minimum Assistance / Contact Guard Assistance   3 - Moderate Assistance   2 - Maximum Assistance   1 - Total Assistance / Dependent       Lab/Data Review:  Recent Results (from the past 24 hour(s))   GLUCOSE, POC    Collection Time: 04/10/17 12:16 PM   Result Value Ref Range    Glucose (POC) 128 (H) 70 - 110 mg/dL   GLUCOSE, POC    Collection Time: 04/10/17  4:17 PM   Result Value Ref Range    Glucose (POC) 125 (H) 70 - 110 mg/dL   GLUCOSE, POC    Collection Time: 04/10/17  8:29 PM   Result Value Ref Range    Glucose (POC) 191 (H) 70 - 110 mg/dL   GLUCOSE, POC    Collection Time: 04/11/17  7:30 AM   Result Value Ref Range    Glucose (POC) 79 70 - 110 mg/dL       Estimated Glomerular Filtration Rate:  CKD-EPI:   On admission, estimated GFR was 53.5 mL/min/1.73m2 based on a Creatinine of 1.22 mg/dl.    Most recent estimated GFR was 60.6 mL/min/1.73m2 based on a Creatinine of 1.10 mg/dl. MDRD:   On admission, estimated GFR was 56.7 mL/min/1.73m2 based on a Creatinine of 1.22 mg/dl. Most recent estimated GFR was 63.9 mL/min/1.73m based on a Creatinine of 1.10 mg/dl. 2. Assessment:     Primary Rehabilitation Diagnosis  1. Impaired Mobility and ADLs  2. Right iliopsoas muscle hematoma, secondary to supratherapeutic INR/anticoagulant therapy      Comorbidities   Nonischemic cardiomyopathy    History of complete heart block    Biventricular implantable cardioverter-defibrillator in situ    Left bundle branch block (LBBB) on electrocardiogram    Type 2 diabetes mellitus with diabetic neuropathy    Dyslipidemia    Diabetic neuropathy associated with type 2 diabetes mellitus    Obstructive sleep apnea on CPAP    History of AICD generator infection     Difficult airway for intubation    Benign hypertensive heart disease with systolic CHF, NYHA class 2     Decreased calculated glomerular filtration rate (GFR)    Chronic anemia    History of deep venous thrombosis    Anticoagulated on Coumadin    Pacemaker twiddler's syndrome    Chronic systolic heart failure     Obesity (BMI 35.0-39.9 without comorbidity)     History of pyelonephritis    History of Coumadin therapy    Gout       Plan:     1. Justification for continued stay: Good progression towards established rehabilitation goals. 2. Medical Issues being followed closely:    [x]  Fall and safety precautions     []  Wound Care     [x]  Bowel and Bladder Function     [x]  Fluid Electrolyte and Nutrition Balance     [x]  Pain Control      3.  Issues that 24 hour rehabilitation nursing is following:    [x]  Fall and safety precautions     []  Wound Care     [x]  Bowel and Bladder Function     [x]  Fluid Electrolyte and Nutrition Balance     [x]  Pain Control      [x]  Assistance with and education on in-room safety with transfers to and from the bed, wheelchair, toilet and shower. 4. Acute rehabilitation plan of care:    [x]  Continue current care and rehab. [x]  Physical Therapy           [x]  Occupational Therapy           []  Speech Therapy     []  Hold Rehab until further notice     5. Medications:    [x]  MAR Reviewed     [x]  Continue Present Medications     6. DVT Prophylaxis:      []  Lovenox     []  Unfractionated Heparin     []  Coumadin     []  Xarelto     [x]  MIMI Stockings     [x]  Sequential Compression Device     []  None     7. Orders:   > Right iliopsoas muscle hematoma, secondary to supratherapeutic INR/anticoagulant therapy   > CT scan of the abdomen and pelvis (3/30/2017) showed an asymmetrically enlarged right iliopsoas muscle compared to the left extending to the right groin region with evidence of intramuscular hematoma in different stage; this is new compared to the recent CT 3/16/17; mild fatty stranding noted in the right pericolonic gutter and the right groin; no additional hematoma or other significant injury seen. > CT scan of the abdomen and pelvis (4/02/2017) showed redemonstration of prominent hematoma within the right psoas muscle, essentially unchanged as compared to previous study on 3/13/2017; around right psoas muscle there are are some hazy densities, indicating mild reactive or congestive changes similar to previous study; otherwise, there is no definable confluent inflammatory phlegmon around right psoas muscle; there is no evidence of hematoma in left psoas muscle or in rest of the retroperitoneum.    > Coumadin had been discontinued on 3/30/2017   > INR (4/6/2017) = 1.3   > Hgb/Hct (4/7/2017) = 8.8/26.5    > Hgb/Hct (4/10/2017) = 9.0/27.2      > Benign hypertensive heart disease with chronic systolic heart failure   > On 4/10/2017, decreased Carvedilol from 12.5 mg to 6.25 mg PO BID with meals (8AM, 5PM)   > Carvedilol 6.25 mg PO BID with meals (8AM, 5PM)     > History of pyelonephritis   > Patient had completed a 5-day treatment course of Aztreonam during his stay at [facility]    > On 4/7/2017, patient was started on Floranex 2 tabs PO BID    > Continue Floranex 2 tabs PO BID     > Type 2 diabetes mellitus with diabetic neuropathy   > On 4/8/2017, decreased Lantus from 24 units SC BID to 25 units SC q HS   > Continue:    > Lantus 25 units SC q HS    > Humalog insulin sliding scale SC TID AC only     > Gout / Hyperuricemia    > Prior to admission to [facility], the patient was on Allopurinol 100 mg PO BID   > Uric acid (4/7/2017) = 3.8   > On 4/7/2017, decreased Allopurinol from 100 mg PO BID to 100 mg PO once daily   > Continue Allopurinol 100 mg PO once daily     > Constipation   > On 4/10/2017, discontinued Polyethylene glycol 17 grams in 8 oz water PO once daily q 7PM    > Continue:    > Docusate sodium 100 mg PO once daily after breakfast    > Pericolace 2 tabs PO once daily after dinner     > Analgesia   > On 4/10/2017:    > Increased Gabapentin from 300 mg to 400 mg PO BID    > Increased Percocet 5/325 to Percocet 7.5/325 1 tab PO TID (8AM, 12PM, 4PM)    > Increased Percocet 5/325 to Percocet 7.5/325 1 tab PO q 4 hr PRN for pain level greater than 4/10 (from 8PM to 4AM only)   > Continue:    > Acetaminophen 650 mg PO q 4 hr PRN for pain level less than 5/10    > Gabapentin 400 mg PO BID    > Percocet 7.5/325 1 tab PO TID (8AM, 12PM, 4PM)    > Percocet 7.5/325 1 tab PO q 4 hr PRN for pain level greater than 4/10 (from 8PM to 4AM only)    > Warm compress to right thigh/hip PRN   > Cyclobenzaprine 5 mg PO q 8 hr      8. Patient's progress in rehabilitation and medical issues discussed with the patient. All questions answered to the best of my ability. Care plan discussed with patient and nurse.       Signed:    Oumou Geller MD    April 11, 2017

## 2017-04-11 NOTE — WOUND CARE
Physical Exam   Musculoskeletal:        Feet:      Wound Foot Right and left foot, ;Plantar;Proximal (Active), POA   DRESSING TYPE Open to air 4/11/2017 12:45 PM   Non-Pressure Ulcer Partial thickness (epider/derm) 4/11/2017 12:45 PM   Condition of Base Epithelializing, no open areas 4/11/2017 12:45 PM   Condition of Edges Calloused 4/11/2017 12:45 PM   Epithelialization (%) 100 4/11/2017 12:45 PM   Drainage Amount  None 4/11/2017 12:45 PM   Wound Odor None 4/11/2017 12:45 PM   Periwound Skin Condition Calloused 4/11/2017 12:45 PM   Number of days:5          187: Patient seen by wound care for skin assessment and recommendation. No open areas noted, bi-lateral 1rst mpj callous noted, right foot with moisture related skin damage between toes noted. Nursing to keep feet RITA as much as possible and keep feet clean and dry. Patient instructed to follow up with Dr. Bassem Gustafson post discharge, for callous care. Care turned over to nursing.   Cory Benton RN

## 2017-04-11 NOTE — PROGRESS NOTES
Sw spoke with pt and her daughter in the room after TC/IDR. Sw discussed dc date of 4/19 and pt and daughter state understanding. Sw discussed family training and daughter agrees to speak with the pt's spouse regarding date and time.

## 2017-04-12 LAB
GLUCOSE BLD STRIP.AUTO-MCNC: 137 MG/DL (ref 70–110)
GLUCOSE BLD STRIP.AUTO-MCNC: 162 MG/DL (ref 70–110)
GLUCOSE BLD STRIP.AUTO-MCNC: 71 MG/DL (ref 70–110)
GLUCOSE BLD STRIP.AUTO-MCNC: 87 MG/DL (ref 70–110)

## 2017-04-12 RX ORDER — INSULIN GLARGINE 100 [IU]/ML
20 INJECTION, SOLUTION SUBCUTANEOUS
Status: DISCONTINUED | OUTPATIENT
Start: 2017-04-12 | End: 2017-04-15

## 2017-04-12 RX ADMIN — GABAPENTIN 400 MG: 400 CAPSULE ORAL at 17:37

## 2017-04-12 RX ADMIN — INSULIN GLARGINE 20 UNITS: 100 INJECTION, SOLUTION SUBCUTANEOUS at 22:01

## 2017-04-12 RX ADMIN — OXYCODONE HYDROCHLORIDE AND ACETAMINOPHEN 1 TABLET: 7.5; 325 TABLET ORAL at 00:05

## 2017-04-12 RX ADMIN — CYCLOBENZAPRINE HYDROCHLORIDE 5 MG: 5 TABLET, FILM COATED ORAL at 05:34

## 2017-04-12 RX ADMIN — OXYCODONE HYDROCHLORIDE AND ACETAMINOPHEN 1 TABLET: 7.5; 325 TABLET ORAL at 11:47

## 2017-04-12 RX ADMIN — CARVEDILOL 6.25 MG: 3.12 TABLET, FILM COATED ORAL at 17:36

## 2017-04-12 RX ADMIN — LACTOBACILLUS TAB 2 TABLET: TAB at 17:36

## 2017-04-12 RX ADMIN — LACTOBACILLUS TAB 2 TABLET: TAB at 09:23

## 2017-04-12 RX ADMIN — OXYCODONE HYDROCHLORIDE AND ACETAMINOPHEN 1 TABLET: 7.5; 325 TABLET ORAL at 07:52

## 2017-04-12 RX ADMIN — OXYCODONE HYDROCHLORIDE AND ACETAMINOPHEN 1 TABLET: 7.5; 325 TABLET ORAL at 04:25

## 2017-04-12 RX ADMIN — OXYCODONE HYDROCHLORIDE AND ACETAMINOPHEN 1 TABLET: 7.5; 325 TABLET ORAL at 20:20

## 2017-04-12 RX ADMIN — GABAPENTIN 400 MG: 400 CAPSULE ORAL at 09:22

## 2017-04-12 RX ADMIN — ALLOPURINOL 100 MG: 100 TABLET ORAL at 09:22

## 2017-04-12 RX ADMIN — FOLIC ACID 1 MG: 1 TABLET ORAL at 09:23

## 2017-04-12 RX ADMIN — OXYCODONE HYDROCHLORIDE AND ACETAMINOPHEN 1 TABLET: 7.5; 325 TABLET ORAL at 16:07

## 2017-04-12 RX ADMIN — STANDARDIZED SENNA CONCENTRATE AND DOCUSATE SODIUM 2 TABLET: 8.6; 5 TABLET, FILM COATED ORAL at 17:36

## 2017-04-12 RX ADMIN — CYCLOBENZAPRINE HYDROCHLORIDE 5 MG: 5 TABLET, FILM COATED ORAL at 22:02

## 2017-04-12 RX ADMIN — DOCUSATE SODIUM 100 MG: 100 CAPSULE, LIQUID FILLED ORAL at 09:22

## 2017-04-12 RX ADMIN — CARVEDILOL 6.25 MG: 3.12 TABLET, FILM COATED ORAL at 07:51

## 2017-04-12 RX ADMIN — CYCLOBENZAPRINE HYDROCHLORIDE 5 MG: 5 TABLET, FILM COATED ORAL at 13:59

## 2017-04-12 NOTE — PROGRESS NOTES
Problem: Self Care Deficits Care Plan (Adult)  Goal: *Therapy Goal (Edit Goal, Insert Text)  Long Term Goals (to be met upon discharge date) in order to increase pts functional independence and safety, and decrease burden of care:  1. Pt will perform grooming with independence. 2. Pt will perform UB bathing with modified independence. 3. Pt will perform LB bathing with modified independence. 4. Pt will perform shower transfer with modified independence. 5. Pt will perform UB dressing with independence. 6. Pt will perform LB dressing with modified independence. 7. Pt will perform toileting task with modified independence. 8. Pt will perform toilet transfer with modified independence. 9. Pt will perform an IADL task while standing with modified independence. Short Term Weekly Goals for (17 to 17) in order to increase pts functional independence and safety, and decrease burden of care:  1. Pt will perform grooming with independence. 2. Pt will perform UB bathing with modified independence. 3. Pt will perform LB bathing with supervision. 4. Pt will perform shower transfer with supervision. 5. Pt will perform UB dressing with independence. 6. Pt will perform LB dressing with supervision. 7. Pt will perform toileting task with supervision. 8. Pt will perform toilet transfer with supervision. OCCUPATIONAL THERAPY DAILY NOTE  Patient Name:Debby XOCHITL Gonzalez   Time In: 1000  Time Out: 1100     Time In: 1430 (-2 units due to pt refusal due to nausea and pain)     Medical Diagnosis:  Debility  Psoas hematoma, right, secondary to anticoagulant therapy Psoas hematoma, right, secondary to anticoagulant therapy      Pain at start of tx: 10/10 in back and R leg  Pain at stop of tx: 10/10 in back and R leg      Patient identified with name and : yes  Subjective: Pt reported burning pain in her back and reported it is the same as yesterday.  Pt reported that she was able to push through the pain more before her fall, but now she is unable to. Objective: Pt required motivation and cues to push through the pain with all activities. During second session attempt Pt was found seated in her recliner in her room attempting to take a nap with a emesis bag in her lap. Pt stated she had just talked to the doctor and was feeling nausea and in too much pain to participate in therapy. Pt was encouraged to participate and refused therapy. THERAPEUTIC ACTIVITY Daily Assessment     Pt ambulated around the kitchen to retrieve cones placed on the counter and in cabinets with a RW and supervision. The patient had to reach up to grab the cones and use bridging to transport the cones around the kitchen to the table. The patient completed the task with 2 attempts while standing for 1:53 and 5:56 with encouragement to push through the pain and finish the task. Pt required max cues to not lean on the counter with her arms or her bottom. Pt demonstrated a forward leaned posture and would walk on her R toes with most of her weight on her L leg. Pt required max cues to put weight on her R leg, put her R foot flat on the floor, and to stand up straight. THERAPEUTIC EXERCISE Daily Assessment     Pt performed 2x10 of elbow flexion, shoulder flexion, and chest press with a 3# weighted bar while seated on edge of w/c. Pt required max verbal and tactile cues to keep elbows extended when performed shoulder flexion and to not bend her elbows with shoulder extension. Pt required rest breaks with each set. Pt required encouragement to complete exercises. IADL Daily Assessment     Pt attempted to gather dishes to transfer to table to set the table, but was unable to complete task due to pain. Pt stood for 2:26 to ambulate to the cabinet, open it, and reach up to collect a bowl out of the cabinet and then c/o of burning pain in her back and needed to sit down.  Pt required encouragement to ambulate back to her w/c due to burning pain in back.       TOILETING Daily Assessment      Pt performed hygiene and clothing management without supervision though she was informed to call therapist who was in her room. MOBILITY/TRANSFERS Daily Assessment       Therapist walked in as Pt was on the toilet and informed the Pt to call before getting off the toilet so she could use a RW to transfer to her w/c. Pt transferred herself off of the toilet to her w/c independently using a stand pivot method. Pt was educated to always call nursing so they can help her transfer using a RW instead of a stand pivot transfer and for safety. Assessment: Pt was limited by pain, but was able to push through the pain once to complete a task. Pt continues to demonstrate a forward leaned posture and decreased activity tolerance. Pt demonstrated decreased motivation and required several cues to encourage her to complete tasks. Plan of Care: Continue with POC to increase independence with I/ADLs and functional transfers.      MARKO Salinas  4/12/2017

## 2017-04-12 NOTE — PROGRESS NOTES
[facility] PHYSICAL REHABILITATION  [address] [place] [address], [address]     INPATIENT REHABILITATION  DAILY PROGRESS NOTE     Date: 4/12/2017    Name: Sandra Michael Age / Sex: 77 y.o. / female   CSN: [id number] MRN: [id number]   [facility] Date: 4/6/2017 Length of Stay: 6 days     Primary Rehab Diagnosis: Impaired Mobility and ADLs secondary to Right iliopsoas muscle hematoma, secondary to supratherapeutic INR/anticoagulant therapy      Subjective:     Patient seen and examined. Blood pressure controlled. Blood sugars on the low side. Patient's Complaint:   No significant medical complaints     Pain Control: ongoing significant pain in which is stable and controlled by current meds      Objective:     Vital Signs:  Patient Vitals for the past 24 hrs:   BP Temp Pulse Resp SpO2   04/12/17 0804 133/68 98.7 °F (37.1 °C) 78 20 99 %   04/11/17 2003 124/70 98.3 °F (36.8 °C) 79 19 98 %   04/11/17 1537 95/50 97.9 °F (36.6 °C) 76 18 96 %        Physical Exam:  GENERAL SURVEY: Patient is awake, alert, oriented x 3, sitting comfortably on the chair, not in acute respiratory distress. HEENT: pink palpebral conjunctivae, anicteric sclerae, no nasoaural discharge, moist oral mucosa  NECK: supple, no jugular venous distention, no palpable lymph nodes  CHEST/LUNGS: symmetrical chest expansion, good air entry, clear breath sounds  HEART: adynamic precordium, good S1 S2, no S3, regular rhythm, no murmurs  ABDOMEN: obese, bowel sounds appreciated, soft, non-tender  EXTREMITIES: pink nailbeds, no edema, full and equal pulses, no calf tenderness   NEUROLOGICAL EXAM: The patient is awake, alert and oriented x3, able to answer questions fairly appropriately, able to follow 1 and 2 step commands. Able to tell time from the wall clock. Cranial nerves II-XII are grossly intact. No gross sensory deficit.  Motor strength is 4/5 on BUE, 4-/5 on the left hip, 4+/5 on the left knee and left ankle, 2+/5 on the right hip (due to pain), 3+/5 on the right knee (due to right hip pain), 4/5 on the right ankle. Current Medications:  Current Facility-Administered Medications   Medication Dose Route Frequency    cyclobenzaprine (FLEXERIL) tablet 5 mg  5 mg Oral Q8H    carvedilol (COREG) tablet 6.25 mg  6.25 mg Oral BID WITH MEALS    oxyCODONE-acetaminophen (PERCOCET 7.5) 7.5-325 mg per tablet 1 Tab  1 Tab Oral TID    oxyCODONE-acetaminophen (PERCOCET 7.5) 7.5-325 mg per tablet 1 Tab  1 Tab Oral Q4H PRN    gabapentin (NEURONTIN) capsule 400 mg  400 mg Oral BID    ondansetron hcl (ZOFRAN) tablet 4 mg  4 mg Oral TID PRN    allopurinol (ZYLOPRIM) tablet 100 mg  100 mg Oral DAILY    docusate sodium (COLACE) capsule 100 mg  100 mg Oral DAILY AFTER BREAKFAST    senna-docusate (PERICOLACE) 8.6-50 mg per tablet 2 Tab  2 Tab Oral PCD    insulin glargine (LANTUS) injection 25 Units  25 Units SubCUTAneous QHS    acetaminophen (TYLENOL) tablet 650 mg  650 mg Oral Q4H PRN    bisacodyl (DULCOLAX) tablet 10 mg  10 mg Oral Q48H PRN    insulin lispro (HUMALOG) injection   SubCUTAneous TIDAC    folic acid (FOLVITE) tablet 1 mg  1 mg Oral DAILY    glucose chewable tablet 16 g  4 Tab Oral PRN    glucagon (GLUCAGEN) injection 1 mg  1 mg IntraMUSCular PRN    dextrose (D50W) injection syrg 12.5-25 g  25-50 mL IntraVENous PRN    Lactobacillus Acidoph & Bulgar (FLORANEX) tablet 2 Tab  2 Tab Oral BID       Allergies:   Allergies   Allergen Reactions    Vancomycin Itching    Ampicillin Itching    Bactrim [Sulfamethoxazole-Trimethoprim] Unknown (comments)    Blueberry Swelling     Causes throat swelling    Ciprofloxacin Itching    Codeine Other (comments)     Jumpy feeling    Crestor [Rosuvastatin] Itching    Darvocet A500 [Propoxyphene N-Acetaminophen] Itching    Demerol [Meperidine] Itching    Levaquin [Levofloxacin] Itching    Lipitor [Atorvastatin] Myalgia    Magnesium Oxide Itching     nausea    Minocin [Minocycline] Unknown (comments)    n [Penicillins] Itching    Pravachol [Pravastatin] Swelling     Swelling in mouth     Sulfa (Sulfonamide Antibiotics) Itching    Ultracet [Tramadol-Acetaminophen] Itching    Vicodin [Hydrocodone-Acetaminophen] Unknown (comments)    Vytorin 10-10 [Ezetimibe-Simvastatin] Myalgia    Percodan [Oxycodone Hcl-Oxycodone-Asa] Itching       Functional Progress:    PHYSICAL THERAPY    ON ADMISSION MOST RECENT   Wheelchair Mobility/Management  Able to Propel (ft): 155 feet (B UE technique with SPV verbal cues for technique)  Functional Level: 5  Curbs/Ramps Assist Required (FIM Score): 0 (Not tested)  Wheelchair Setup Assist Required : 3 (Moderate assistance)  Wheelchair Management: Manages left brake, Manages right brake (w/ extender, verbal and visual cues + min a x placement) Wheelchair Mobility/Management  Able to Propel (ft): 300 feet  Functional Level: 6  Curbs/Ramps Assist Required (FIM Score): 1 (Total assistance)  Wheelchair Setup Assist Required : 6 (Modified independent)  Wheelchair Management: Manages left brake, Manages right brake     Gait  Amount of Assistance: 0 (Not tested) (Pt unable at this time)  Distance (ft): 0 Feet (ft)  Assistive Device:  (NA) Gait  Amount of Assistance: 4 (Contact guard assistance)  Distance (ft): 20 Feet (ft) (x 2 trials)  Assistive Device: Walker, rolling     Balance-Sitting/Standing  Sitting - Static: Poor (constant support)  Sitting - Dynamic: Poor (constant support)  Standing - Static: Poor  Standing - Dynamic : Impaired Balance-Sitting/Standing  Sitting - Static: Good (unsupported)  Sitting - Dynamic: Good (unsupported)  Standing - Static: Fair  Standing - Dynamic : Impaired     Bed/Mat Mobility  Rolling Right : 1 (Total assistance) (unable to assess 2/2 inability to lay supine)  Rolling Left : 1 (Total assistance) (unable to assess 2/2 inability to lay supine)  Supine to Sit : 1 (Total assistance) (unable to assess 2/2 inability to lay supine)  Sit to Supine : 1 (Total assistance) (unable to assess 2/2 inability to lay supine) Bed/Mat Mobility  Rolling Right : 5 (Supervision)  Rolling Left : 1 (Total assistance)  Supine to Sit : 5 (Supervision)  Sit to Supine : 5 (Supervision)     Transfers  Transfer Type: Other  Other: Patient performs stand step transfer with anterior aproach, B UE support, weight shift cues, step placement cues and controlled lowering. Patient with significant antalgia with R LE loading and forward flexed posturing requiring assist to achieve correct LE placement. Transfer Assistance : 4 (Minimal assistance)  Sit to Stand Assistance: Minimal assistance  Car Transfers: Not tested  Car Type: NA Transfers  Transfer Type: Other  Other: Patient performs stand step transfer x 2 in session with pedalert boot utilized to encourage R LE weight bearing (set to ~112 lbs). Patient requires verbal cueing for set-up positioning and technique adjustments 2/2 over utilization of B UE during initial 75% of transfer. Pedalert boot utilized as positive re-enforcement to encourage increased R LE weight bearing.   Transfer Assistance : 4 (Contact guard assistance)  Sit to Stand Assistance: Contact guard assistance  Car Transfers: Not tested  Car Type: NA     Steps or Stairs  Steps/Stairs Ambulated (#): 0 (Pt unable)  Level of Assist : 0 (Not tested)  Rail Use:  (NA) Steps or Stairs  Steps/Stairs Ambulated (#): 0 (Pt unable)  Level of Assist : 0 (Not tested)  Rail Use:  (NA)         Lab/Data Review:  Recent Results (from the past 24 hour(s))   GLUCOSE, POC    Collection Time: 04/11/17  4:57 PM   Result Value Ref Range    Glucose (POC) 148 (H) 70 - 110 mg/dL   GLUCOSE, POC    Collection Time: 04/11/17  9:05 PM   Result Value Ref Range    Glucose (POC) 134 (H) 70 - 110 mg/dL   GLUCOSE, POC    Collection Time: 04/12/17  7:48 AM   Result Value Ref Range    Glucose (POC) 87 70 - 110 mg/dL   GLUCOSE, POC    Collection Time: 04/12/17 11:35 AM   Result Value Ref Range    Glucose (POC) 71 70 - 110 mg/dL       Estimated Glomerular Filtration Rate:  CKD-EPI:   On admission, estimated GFR was 53.5 mL/min/1.73m2 based on a Creatinine of 1.22 mg/dl. Most recent estimated GFR was 60.6 mL/min/1.73m2 based on a Creatinine of 1.10 mg/dl. MDRD:   On admission, estimated GFR was 56.7 mL/min/1.73m2 based on a Creatinine of 1.22 mg/dl. Most recent estimated GFR was 63.9 mL/min/1.73m based on a Creatinine of 1.10 mg/dl. 2. Assessment:     Primary Rehabilitation Diagnosis  1. Impaired Mobility and ADLs  2. Right iliopsoas muscle hematoma, secondary to supratherapeutic INR/anticoagulant therapy      Comorbidities   Nonischemic cardiomyopathy    History of complete heart block    Biventricular implantable cardioverter-defibrillator in situ    Left bundle branch block (LBBB) on electrocardiogram    Type 2 diabetes mellitus with diabetic neuropathy    Dyslipidemia    Diabetic neuropathy associated with type 2 diabetes mellitus    Obstructive sleep apnea on CPAP    History of AICD generator infection     Difficult airway for intubation    Benign hypertensive heart disease with systolic CHF, NYHA class 2     Decreased calculated glomerular filtration rate (GFR)    Chronic anemia    History of deep venous thrombosis    Anticoagulated on Coumadin    Pacemaker twiddler's syndrome    Chronic systolic heart failure     Obesity (BMI 35.0-39.9 without comorbidity)     History of pyelonephritis    History of Coumadin therapy    Gout       Plan:     1. Justification for continued stay: Good progression towards established rehabilitation goals. 2. Medical Issues being followed closely:    [x]  Fall and safety precautions     []  Wound Care     [x]  Bowel and Bladder Function     [x]  Fluid Electrolyte and Nutrition Balance     [x]  Pain Control      3.  Issues that 24 hour rehabilitation nursing is following:    [x]  Fall and safety precautions     []  Wound Care     [x]  Bowel and Bladder Function [x]  Fluid Electrolyte and Nutrition Balance     [x]  Pain Control      [x]  Assistance with and education on in-room safety with transfers to and from the bed, wheelchair, toilet and shower. 4. Acute rehabilitation plan of care:    [x]  Continue current care and rehab. [x]  Physical Therapy           [x]  Occupational Therapy           []  Speech Therapy     []  Hold Rehab until further notice     5. Medications:    [x]  MAR Reviewed     [x]  Continue Present Medications     6. DVT Prophylaxis:      []  Lovenox     []  Unfractionated Heparin     []  Coumadin     []  Xarelto     [x]  MIMI Stockings     [x]  Sequential Compression Device     []  None     7. Orders:   > Right iliopsoas muscle hematoma, secondary to supratherapeutic INR/anticoagulant therapy   > CT scan of the abdomen and pelvis (3/30/2017) showed an asymmetrically enlarged right iliopsoas muscle compared to the left extending to the right groin region with evidence of intramuscular hematoma in different stage; this is new compared to the recent CT 3/16/17; mild fatty stranding noted in the right pericolonic gutter and the right groin; no additional hematoma or other significant injury seen. > CT scan of the abdomen and pelvis (4/02/2017) showed redemonstration of prominent hematoma within the right psoas muscle, essentially unchanged as compared to previous study on 3/13/2017; around right psoas muscle there are are some hazy densities, indicating mild reactive or congestive changes similar to previous study; otherwise, there is no definable confluent inflammatory phlegmon around right psoas muscle; there is no evidence of hematoma in left psoas muscle or in rest of the retroperitoneum.    > Coumadin had been discontinued on 3/30/2017   > INR (4/6/2017) = 1.3   > Hgb/Hct (4/7/2017) = 8.8/26.5    > Hgb/Hct (4/10/2017) = 9.0/27.2      > Benign hypertensive heart disease with chronic systolic heart failure   > On 4/10/2017, decreased Carvedilol from 12.5 mg to 6.25 mg PO BID with meals (8AM, 5PM)   > Continue Carvedilol 6.25 mg PO BID with meals (8AM, 5PM)     > History of pyelonephritis   > Patient had completed a 5-day treatment course of Aztreonam during his stay at [facility]    > On 4/7/2017, patient was started on Floranex 2 tabs PO BID    > Continue Floranex 2 tabs PO BID     > Type 2 diabetes mellitus with diabetic neuropathy   > On 4/8/2017, decreased Lantus from 24 units SC BID to 25 units SC q HS   > Continue:    > Decrease Lantus from 25 units to 20 units SC q HS    > Humalog insulin sliding scale SC TID AC only     > Gout / Hyperuricemia    > Prior to admission to [facility], the patient was on Allopurinol 100 mg PO BID   > Uric acid (4/7/2017) = 3.8   > On 4/7/2017, decreased Allopurinol from 100 mg PO BID to 100 mg PO once daily   > Continue Allopurinol 100 mg PO once daily     > Constipation   > On 4/10/2017, discontinued Polyethylene glycol 17 grams in 8 oz water PO once daily q 7PM    > Add Naloxegol 25 mg PO once daily before breakfast   > Continue:    > Docusate sodium 100 mg PO once daily after breakfast    > Pericolace 2 tabs PO once daily after dinner     > Analgesia   > On 4/10/2017:    > Increased Gabapentin from 300 mg to 400 mg PO BID    > Increased Percocet 5/325 to Percocet 7.5/325 1 tab PO TID (8AM, 12PM, 4PM)    > Increased Percocet 5/325 to Percocet 7.5/325 1 tab PO q 4 hr PRN for pain level greater than 4/10 (from 8PM to 4AM only)    > On 4/11/2017, added Cyclobenzaprine 5 mg PO q 8 hr  > Continue:    > Acetaminophen 650 mg PO q 4 hr PRN for pain level less than 5/10    > Cyclobenzaprine 5 mg PO q 8 hr    > Gabapentin 400 mg PO BID    > Percocet 7.5/325 1 tab PO TID (8AM, 12PM, 4PM)    > Percocet 7.5/325 1 tab PO q 4 hr PRN for pain level greater than 4/10 (from 8PM to 4AM only)    > Warm compress to right thigh/hip PRN      8.  Patient's progress in rehabilitation and medical issues discussed with the patient. All questions answered to the best of my ability. Care plan discussed with patient and nurse.       Signed:    Keesha Cardona MD    April 12, 2017

## 2017-04-12 NOTE — PROGRESS NOTES
NUTRITION    BPA/MST Referral    Nutrition Consult: General Nutrition Management and Supplements     RECOMMENDATIONS / PLAN:     - Update food preferences  - Continue RD inpatient monitoring and evaluation. NUTRITION INTERVENTIONS & DIAGNOSIS:     [x] Meals/Snacks: modified diet   [x] Medical food supplementation: glucerna shake TID  [x] Vitamin/mineral supplementation: folic acid    Nutrition Diagnosis: Inadequate energy intake related to decreased appetite as evidenced by decreased meal intake PTA    ASSESSMENT:     Subjective/Objective:  Patient reported appetite and meal intake are poor/fair. Did not eat much breakfast today. Discussed food preferences. Pt has fair intake of supplements. Encouraged po intake meals and supplements. Average po intake adequate to meet patients estimated nutritional needs:   [] Yes     [x] No   [] Unable to determine at this time    Diet: DIET DIABETIC CONSISTENT CARB Regular; AHA-LOW-CHOL FAT; No Conc.  Sweets  DIET NUTRITIONAL SUPPLEMENTS All Meals; [facility]      Food Allergies: blueberries  Current Appetite:   [] Good     [x] Fair     [] Poor     [] Other:    Appetite/meal intake prior to admission:   [] Good     [] Fair     [x] Poor (per nutrition screen)    [] Other:  Feeding Limitations:  [] Swallowing difficulty    [] Chewing difficulty    [] Other:  Current Meal Intake:   Patient Vitals for the past 100 hrs:   % Diet Eaten   04/12/17 1316 25 %   04/12/17 1000 50 %   04/11/17 1800 65 %   04/11/17 1347 65 %   04/11/17 1000 65 %   04/10/17 1856 50 %   04/10/17 1319 25 %   04/10/17 0946 75 %   04/09/17 1828 85 %   04/09/17 1300 90 %   04/09/17 0900 25 %   04/08/17 1740 70 %   04/08/17 1255 50 %     BM: 4/8  Skin Integrity:  Callus on right foot   Edema: none   Pertinent Medications: Reviewed; colace, miralax, dulcolax      Recent Labs      04/10/17   0613   NA  136   K  4.5   CL  100   CO2  27   GLU  115*   BUN  11   CREA  1.10   CA  9.2       Intake/Output Summary (Last 24 hours) at 04/12/17 1646  Last data filed at 04/12/17 1316   Gross per 24 hour   Intake              540 ml   Output                0 ml   Net              540 ml       Anthropometrics:  Ht Readings from Last 1 Encounters:   04/06/17 5' 7\" (1.702 m)     Last 3 Recorded Weights in this Encounter    04/06/17 2202   Weight: 105.7 kg (233 lb)     Body mass index is 36.49 kg/(m^2). Weight History:  Per chart review, patient reported usual body weight is 240 lbs. Stated felt that she had lost weight PTA; clothes fit looser. Noted weight loss of 7 lbs (2.9%) in past 2-4 weeks PTA per chart history    Weight Metrics 4/6/2017 4/6/2017 3/30/2017 3/16/2017 3/14/2017 3/13/2017 2/9/2017   Weight 233 lb 227 lb 1.6 oz - 240 lb 243 lb 245 lb 245 lb   BMI 36.49 kg/m2 - 35.57 kg/m2 37.59 kg/m2 38.06 kg/m2 38.37 kg/m2 38.37 kg/m2        Admitting Diagnosis: Debility  Psoas hematoma, right, secondary to anticoagulant therapy  Past Medical History:   Diagnosis Date    Benign hypertensive heart disease with systolic CHF, NYHA class 2 ([facility] [address] 9/5/2012    Biventricular implantable cardioverter-defibrillator in situ 04/28/2005    Upgraded to BiV AICD; gen change 4/2008; pocket revision 10/2009; Abdominal - done on 8/22/2012 by Dr. Garth Contreras Cardiac cath 08/15/1996    Patent coronaries. Elev LVEDP. EF 50-55%.  Cardiac echocardiogram 06/23/2015    Ltd study. EF 45-50%. Mild, diffuse hypk. Severe apical hypk. No mass or thrombus was clearly identified, although imaging was suboptimal.      Cardiac nuclear imaging test 06/19/2015    Fixed distal apical, distal septal defect more likely due to RV pacing than prior infarct. No ischemia. EF 46%. RWMA c/w RV pacing. Nondiagnostic EKG on pharm stress test.      Cardiovascular lower extremity venous duplex 09/04/2012    Acute, non-occlusive DVT in CFV on right. No DVT on left. No superficial thrombosis bilaterally.     Cardiovascular upper extremity venous duplex 08/27/2012    DVT in axillary vein on left. Left subclavian was not visualized.  Chronic anemia 9/5/2012    Chronic systolic heart failure (HCC)     Decreased calculated glomerular filtration rate (GFR) 3/30/2017    Calculated GFR equivalent to that of CKD stage 3 = 30-59 ml/min    Diabetic neuropathy associated with type 2 diabetes mellitus ([facility] 6/28/2011    Difficult airway for intubation 08/22/2012    see anesthesia airway note    Dyslipidemia 6/28/2011    Gout     History of complete heart block 6/28/2011    History of Coumadin therapy     Anticoagulation for DVT of the LUE; Discontinued on 3/30/2017    History of deep venous thrombosis 9/5/2012    Left upper extremity    History of pyelonephritis 3/30/2017    Left bundle branch block (LBBB) on electrocardiogram 6/28/2011    Nonischemic cardiomyopathy ([facility] 6/28/2011    Obesity (BMI 35.0-39.9 without comorbidity) ([facility] 3/13/2017    Obstructive sleep apnea on CPAP 2/7/2012    Psoas hematoma, right, secondary to anticoagulant therapy 3/30/2017    Type 2 diabetes mellitus with diabetic neuropathy ([facility] 6/28/2011       Education Needs:        [x] None identified  [] Identified - Not appropriate at this time  []  Identified and addressed - refer to education log  Learning Limitations:   [x] None identified  [] Identified    Cultural, Latter day & ethnic food preferences:  [x] None identified    [] Identified and addressed     ESTIMATED NUTRITION NEEDS:     Calories: 5641-1450 kcal (MSJx1.2-1.3) based on  [x] Actual BW: 106 kg      [] IBW   CHO: 245-265 gm (50% kcal)   Protein: 127-138 gm (1.2-1.3 gm/kg) based on  [x] Actual BW      [] IBW   Fluid: 1 mL/kcal     MONITORING & EVALUATION:     Nutrition Goal(s):   1. Po intake of meals will meet >75% of patient estimated nutritional needs within the next 7 days.   Outcome:  [] Met/Ongoing    [x]  Not Met    [] New/Initial Goal     Monitoring:   [x] Diet tolerance   [x] Meal intake   [x] Supplement intake   [] GI symptoms/ability to tolerate po diet   [] Respiratory status   [] Plan of care      Previous Recommendations (for follow-up assessments only):     []   Implemented       []   Not Implemented (RD to address)     [x] No Recommendation Made     Discharge Planning: diabetic, cardiac diet  [x] Participated in care planning, discharge planning, & interdisciplinary rounds as appropriate      Geneva Gerard, [address] [place] [address]   Pager: [phone]

## 2017-04-12 NOTE — PROGRESS NOTES
Matilde spoke with pt to [facility] for home health. Pt states that she has used [facility] before and would like to return. Matilde notified agency in [address]

## 2017-04-12 NOTE — PROGRESS NOTES
Problem: Mobility Impaired (Adult and Pediatric)  Goal: *Acute Goals and Plan of Care (Insert Text)  Physical Therapy Short Goals  Initiated 4/7/2017 and to be accomplished within 7 day(s)  1. Patient will move from supine to sit and sit to supine , scoot up and down and roll side to side in bed with maximal assistance. 2. Patient will transfer from bed to chair and chair to bed with CGA using the least restrictive device. 3. Patient will perform sit to stand with supervision/set-up. 4. Patient will ambulate with minimal assistance/contact guard assist for 25 feet with the least restrictive device.       Physical Therapy Long Goals  Initiated 4/7/2017 and to be accomplished within 21 day(s)  1. Patient will move from supine to sit and sit to supine , scoot up and down and roll side to side in bed with modified independence. 2. Patient will transfer from bed to chair and chair to bed with Mod I using the least restrictive device. 3. Patient will perform sit to stand with modified independence. 4. Patient will ambulate with supervision/set-up for 150 feet with the least restrictive device. 5. Patient will ascend/descend 3 stairs with 1 handrail(s) with minimal assistance/contact guard assist.   PHYSICAL THERAPY DAILY NOTE  Patient Name:Debby Gonzalez  Time In: 0809  Time Out: 0910  Time In: 1100  Time Out: 1130  Patient Seen For: Gait training; Wheelchair mobility;Transfer training;Patient education;Balance activities  Diagnosis: Debility  Psoas hematoma, right, secondary to anticoagulant therapy Psoas hematoma, right, secondary to anticoagulant therapy  Precautions: Fall Risk     Subjective: Patient reports continued burning pain at hip and back \"wrapping around\" noting that she just received her pain medicine and she will participate in therapy after it has started to take effect. Patient requires max verbal cueing to participate in treatment session.      Pain at start of tx:9/10  Pain at stop of tx:9/10 Patient identified with name and : YES         Objective: PM session: Patient sitting supine in bed with HOB 65 degrees with PT attempting relaxation training while lowering HOB. Patient able to lower to 50 degrees over 27 min w/ intermittent transition to unsupported long sitting 2/2 pain and discomfort. TRANSFERS Daily Assessment     Transfer Type: Other  Other: Patient performs stand step transfer x 2 in session with pedalert boot utilized to encourage R LE weight bearing (set to ~112 lbs). Patient requires verbal cueing for set-up positioning and technique adjustments 2/2 over utilization of B UE during initial 75% of transfer. Pedalert boot utilized as positive re-enforcement to encourage increased R LE weight bearing. STS: Patient performs STS transfer x 10 in sess with ped alert boot for cues to increase R LE weight bearing. Pedalert boot initially set to ~80lbs x 3 trials with increase to ~112lbs for subsequent 7 trials. Patient requires min a x 1 with tactile cueing to promote anterior COG and B LE loading during STS 2/2 tendency toward B UE press-up for initial 75% of STS. GAIT Daily Assessment     Amount of Assistance: 4 (Contact guard assistance)  Distance (ft): 20 Feet (ft) (x 2 trials)  Assistive Device: Walker, rolling      Patient demonstrates significant forward pitched trunk posturing requiring verbal and tactile cueing to reduce poor posturing with minimal effect. Patient demonstrates step-to gait with decreased stance time on R LE with R LE ER'd and slightly abducted. Patient requires significant encouragement and pedalert boot utilization (set to ~ 112lbs) with cues to make the boot \"beep\" when the R LE is on the floor. Pedalert boot improves R LE weight bearing with biofeedback cues.        BALANCE Daily Assessment     Sitting - Static: Good (unsupported)  Sitting - Dynamic: Good (unsupported)  Standing - Static: Fair  Standing - Dynamic : Impaired          WHEELCHAIR MOBILITY Daily Assessment     Able to Propel (ft): 300 feet  Functional Level: 6              Assessment: Patient demonstrates avoidance of therapist recommended activities 2/2 pain. Patient in-attentive to therapist cues in second session for attempted relaxation training to improve overall tolerance. Patient will require continued education and skilled intervention to improve tolerance and initiate stair negotiation. Patient does demonstrate increased R LE weight bearing during STS and stand step transfers with pedalert boot use. Plan of Care: Cont current POC; re-attempt positional tolerance training for psoas stretch next session. Balaji Velasquez, PT DPT  4/12/2017

## 2017-04-12 NOTE — ROUTINE PROCESS
SHIFT CHANGE NOTE FOR [facility]    Bedside and Verbal shift change report given to Laurence Fair, RN (oncoming nurse) by Laurence Fair, RN   (offgoing nurse). Report included the following information SBAR, Kardex, MAR and Recent Results. Situation:   Code Status: Full Code   Reason for Admission: Psoas Hematoma  Hospital Day: 6   Problem List:   Hospital Problems  Date Reviewed: 4/12/2017          Codes Class Noted POA    History of Coumadin therapy ICD-10-CM: Z79.01  ICD-9-CM: V58.61  Unknown Yes    Overview Signed 4/6/2017 10:35 PM by Darvin Osler, MD     Anticoagulation for chronic atrial fibrillation; Discontinued on 3/30/2017             Decreased calculated glomerular filtration rate (GFR) ICD-10-CM: R94.4  ICD-9-CM: 794.4  3/30/2017 Yes    Overview Signed 4/6/2017  5:47 PM by Darvin Osler, MD     Calculated GFR equivalent to that of CKD stage 3 = 30-59 ml/min             History of pyelonephritis ICD-10-CM: Z87.440  ICD-9-CM: V13.02  3/30/2017 Yes        Iliopsoas muscle hematoma ICD-10-CM: J91.72BQ  ICD-9-CM: 924.00  3/30/2017 Yes        * (Principal)Psoas hematoma, right, secondary to anticoagulant therapy ICD-10-CM: S30. 1XXA  ICD-9-CM: 924.9, E934.2  3/30/2017 Yes        Generalized weakness ICD-10-CM: R53.1  ICD-9-CM: 780.79  3/30/2017 Yes        Impaired mobility and ADLs ICD-10-CM: Z74.09  ICD-9-CM: 799.89  3/30/2017 Yes        Benign hypertensive heart disease with systolic CHF, NYHA class 2 (HCC) (Chronic) ICD-10-CM: I11.0, I50.20  ICD-9-CM: 402.11, 428.20, 428.0  9/5/2012 Yes        Type 2 diabetes mellitus with diabetic neuropathy (HCC) (Chronic) ICD-10-CM: E11.40  ICD-9-CM: 250.60, 357.2  6/28/2011 Yes              Background:   Past Medical History:   Past Medical History:   Diagnosis Date    Benign hypertensive heart disease with systolic CHF, NYHA class 2 ([facility] [address] 9/5/2012    Biventricular implantable cardioverter-defibrillator in situ 04/28/2005    Upgraded to BiV AICD; gen change 4/2008; pocket revision 10/2009; Abdominal - done on 8/22/2012 by Dr. Jac Robert Cardiac cath 08/15/1996    Patent coronaries. Elev LVEDP. EF 50-55%.  Cardiac echocardiogram 06/23/2015    Ltd study. EF 45-50%. Mild, diffuse hypk. Severe apical hypk. No mass or thrombus was clearly identified, although imaging was suboptimal.      Cardiac nuclear imaging test 06/19/2015    Fixed distal apical, distal septal defect more likely due to RV pacing than prior infarct. No ischemia. EF 46%. RWMA c/w RV pacing. Nondiagnostic EKG on pharm stress test.      Cardiovascular lower extremity venous duplex 09/04/2012    Acute, non-occlusive DVT in CFV on right. No DVT on left. No superficial thrombosis bilaterally.  Cardiovascular upper extremity venous duplex 08/27/2012    DVT in axillary vein on left. Left subclavian was not visualized.     Chronic anemia 9/5/2012    Chronic systolic heart failure (HCC)     Decreased calculated glomerular filtration rate (GFR) 3/30/2017    Calculated GFR equivalent to that of CKD stage 3 = 30-59 ml/min    Diabetic neuropathy associated with type 2 diabetes mellitus ([address] 6/28/2011    Difficult airway for intubation 08/22/2012    see anesthesia airway note    Dyslipidemia 6/28/2011    Gout     History of complete heart block 6/28/2011    History of Coumadin therapy     Anticoagulation for DVT of the LUE; Discontinued on 3/30/2017    History of deep venous thrombosis 9/5/2012    Left upper extremity    History of pyelonephritis 3/30/2017    Left bundle branch block (LBBB) on electrocardiogram 6/28/2011    Nonischemic cardiomyopathy ([address] 6/28/2011    Obesity (BMI 35.0-39.9 without comorbidity) ([address] 3/13/2017    Obstructive sleep apnea on CPAP 2/7/2012    Psoas hematoma, right, secondary to anticoagulant therapy 3/30/2017    Type 2 diabetes mellitus with diabetic neuropathy ([address] 6/28/2011      Patient taking anticoagulants no    Patient has a defibrillator: yes Assessment:   Changes in Assessment throughout shift: no     Patient has central line: no Reasons if yes: Insertion date: Last dressing date:   Patient has Krueger Cath: no Reasons if yes: Insertion date:     Last Vitals:     Vitals:    04/11/17 1537 04/11/17 2003 04/12/17 0804 04/12/17 1628   BP: 95/50 124/70 133/68 142/72   Pulse: 76 79 78 82   Resp: 18 19 20 18   Temp: 97.9 °F (36.6 °C) 98.3 °F (36.8 °C) 98.7 °F (37.1 °C) 98.5 °F (36.9 °C)   SpO2: 96% 98% 99% 98%   Weight:       Height:            PAIN    Pain Assessment    Pain Intensity 1: 6 (04/12/17 1600) Pain Intensity 1: 2 (12/29/14 1105)    Pain Location 1: Back, Groin Pain Location 1: Abdomen    Pain Intervention(s) 1: Medication (see MAR) Pain Intervention(s) 1: Medication (see MAR)  Patient Stated Pain Goal: 0 Patient Stated Pain Goal: 0  o Intervention effective: no    o Other actions taken for pain: pain medicine     Skin Assessment  Skin color Skin Color: Appropriate for ethnicity  Condition/Temperature Skin Condition/Temp: Warm  Integrity Skin Integrity: Intact  Turgor    Weekly Pressure Ulcer Documentation Weekly Pressure Ulcer Documentation: No Pressure Ulcer Noted-Pressure Ulcer Prevention Initiated  Wound Prevention & Protection Methods  Orientation of wound Orientation of Wound Prevention: Posterior  Location of Prevention Location of Wound Prevention: Sacrum/Coccyx  Dressing Present Dressing Present : No  Dressing Status    Wound Offloading Wound Offloading (Prevention Methods): Bed, pressure redistribution/air     INTAKE/OUPUT    Date 04/11/17 1900 - 04/12/17 0659 04/12/17 0700 - 04/13/17 0659   Shift 1625-5645 24 Hour Total 5505-8526 9756-4661 24 Hour Total   I  N  T  A  K  E   P.O.  480 620  620      P. O.  480 620  620    Shift Total  (mL/kg)  480  (4.5) 620  (5.9)  620  (5.9)   O  U  T  P  U  T   Urine  (mL/kg/hr)           Urine Occurrence(s) 3 x 6 x 2 x  2 x    Stool           Stool Occurrence(s) 0 x 0 x 0 x  0 x    Shift Total  (mL/kg)        NET  480 235  620   Weight (kg) 105.7 105.7 105.7 105.7 105.7       Recommendations:  1. Patient needs and requests: pain medicine    2. Diet: diabetic    3. Pending tests/procedures: no     4. Functional Level/Equipment: 1 x person assist    5. Estimated Discharge Date: TDB Posted on Whiteboard in Patients Room: [facility] Safety Check    A safety check occurred in the patient's room between off going nurse and oncoming nurse listed above. The safety check included the below items  Area Items   H  High Alert Medications - Verify all high alert medication drips (heparin, PCA, etc.)   E  Equipment - Suction is set up for ALL patients (with vania)  - Red plugs utilized for all equipment (IV pumps, etc.)  - WOWs wiped down at end of shift.  - Room stocked with oxygen, suction, and other unit-specific supplies   A  Alarms - Bed alarm is set for fall risk patients  - Ensure chair alarm is in place and activated if patient is up in a chair   L  Lines - Check IV for any infiltration  - Krueger bag is empty if patient has a Krueger   - Tubing and IV bags are labeled   S  Safety   - Room is clean, patient is clean, and equipment is clean. - Hallways are clear from equipment besides carts. - Fall bracelet on for fall risk patients  - Ensure room is clear and free of clutter  - Suction is set up for ALL patients (with vania)  - Hallways are clear from equipment besides carts.    - Isolation precautions followed, supplies available outside room, sign posted

## 2017-04-12 NOTE — ROUTINE PROCESS
[facility]    Bedside and Verbal shift change report given to Anju Loera RN (oncoming nurse) by Modesta Ontiveros RN   (offgoing nurse). Report included the following information SBAR, Kardex, MAR and Recent Results. Situation:   Code Status: Full Code   Reason for Admission: Psoas Hematoma  Hospital Day: 6   Problem List:   Hospital Problems  Date Reviewed: 4/11/2017          Codes Class Noted POA    History of Coumadin therapy ICD-10-CM: Z79.01  ICD-9-CM: V58.61  Unknown Yes    Overview Signed 4/6/2017 10:35 PM by Liane Bailey MD     Anticoagulation for chronic atrial fibrillation; Discontinued on 3/30/2017             Decreased calculated glomerular filtration rate (GFR) ICD-10-CM: R94.4  ICD-9-CM: 794.4  3/30/2017 Yes    Overview Signed 4/6/2017  5:47 PM by Liane Bailey MD     Calculated GFR equivalent to that of CKD stage 3 = 30-59 ml/min             History of pyelonephritis ICD-10-CM: Z87.440  ICD-9-CM: V13.02  3/30/2017 Yes        Iliopsoas muscle hematoma ICD-10-CM: E82.57DV  ICD-9-CM: 924.00  3/30/2017 Yes        * (Principal)Psoas hematoma, right, secondary to anticoagulant therapy ICD-10-CM: S30. 1XXA  ICD-9-CM: 924.9, E934.2  3/30/2017 Yes        Generalized weakness ICD-10-CM: R53.1  ICD-9-CM: 780.79  3/30/2017 Yes        Impaired mobility and ADLs ICD-10-CM: Z74.09  ICD-9-CM: 799.89  3/30/2017 Yes        Benign hypertensive heart disease with systolic CHF, NYHA class 2 (HCC) (Chronic) ICD-10-CM: I11.0, I50.20  ICD-9-CM: 402.11, 428.20, 428.0  9/5/2012 Yes        Type 2 diabetes mellitus with diabetic neuropathy (HCC) (Chronic) ICD-10-CM: E11.40  ICD-9-CM: 250.60, 357.2  6/28/2011 Yes              Background:   Past Medical History:   Past Medical History:   Diagnosis Date    Benign hypertensive heart disease with systolic CHF, NYHA class 2 ([address] 9/5/2012    Biventricular implantable cardioverter-defibrillator in situ 04/28/2005    Upgraded to BiV AICD; gen change 4/2008; pocket revision 10/2009; Abdominal - done on 8/22/2012 by Dr. Radha Mccray Cardiac cath 08/15/1996    Patent coronaries. Elev LVEDP. EF 50-55%.  Cardiac echocardiogram 06/23/2015    Ltd study. EF 45-50%. Mild, diffuse hypk. Severe apical hypk. No mass or thrombus was clearly identified, although imaging was suboptimal.      Cardiac nuclear imaging test 06/19/2015    Fixed distal apical, distal septal defect more likely due to RV pacing than prior infarct. No ischemia. EF 46%. RWMA c/w RV pacing. Nondiagnostic EKG on pharm stress test.      Cardiovascular lower extremity venous duplex 09/04/2012    Acute, non-occlusive DVT in CFV on right. No DVT on left. No superficial thrombosis bilaterally.  Cardiovascular upper extremity venous duplex 08/27/2012    DVT in axillary vein on left. Left subclavian was not visualized.     Chronic anemia 9/5/2012    Chronic systolic heart failure (HCC)     Decreased calculated glomerular filtration rate (GFR) 3/30/2017    Calculated GFR equivalent to that of CKD stage 3 = 30-59 ml/min    Diabetic neuropathy associated with type 2 diabetes mellitus ([address] 6/28/2011    Difficult airway for intubation 08/22/2012    see anesthesia airway note    Dyslipidemia 6/28/2011    Gout     History of complete heart block 6/28/2011    History of Coumadin therapy     Anticoagulation for DVT of the LUE; Discontinued on 3/30/2017    History of deep venous thrombosis 9/5/2012    Left upper extremity    History of pyelonephritis 3/30/2017    Left bundle branch block (LBBB) on electrocardiogram 6/28/2011    Nonischemic cardiomyopathy ([address] 6/28/2011    Obesity (BMI 35.0-39.9 without comorbidity) ([address] 3/13/2017    Obstructive sleep apnea on CPAP 2/7/2012    Psoas hematoma, right, secondary to anticoagulant therapy 3/30/2017    Type 2 diabetes mellitus with diabetic neuropathy ([address] 6/28/2011      Patient taking anticoagulants no    Patient has a defibrillator: yes     Assessment:   Changes in Assessment throughout shift: no     Patient has central line: no Reasons if yes: Insertion date: Last dressing date:   Patient has Krueger Cath: no Reasons if yes: Insertion date:     Last Vitals:     Vitals:    04/10/17 2000 04/11/17 0739 04/11/17 1537 04/11/17 2003   BP: 120/62 127/64 95/50 124/70   Pulse: 72 79 76 79   Resp: 18 18 18 19   Temp: 97.6 °F (36.4 °C) 98 °F (36.7 °C) 97.9 °F (36.6 °C) 98.3 °F (36.8 °C)   SpO2: 100% 98% 96% 98%   Weight:       Height:            PAIN    Pain Assessment    Pain Intensity 1: 9 (04/12/17 0425) Pain Intensity 1: 2 (12/29/14 1105)    Pain Location 1: Back, Groin, Hip Pain Location 1: Abdomen    Pain Intervention(s) 1: Medication (see MAR) Pain Intervention(s) 1: Medication (see MAR)  Patient Stated Pain Goal: 0 Patient Stated Pain Goal: 0  o Intervention effective: no    o Other actions taken for pain: pain medicine     Skin Assessment  Skin color Skin Color: Appropriate for ethnicity  Condition/Temperature Skin Condition/Temp: Warm  Integrity Skin Integrity: Intact  Turgor    Weekly Pressure Ulcer Documentation Weekly Pressure Ulcer Documentation: No Pressure Ulcer Noted-Pressure Ulcer Prevention Initiated  Wound Prevention & Protection Methods  Orientation of wound Orientation of Wound Prevention: Posterior  Location of Prevention Location of Wound Prevention: Sacrum/Coccyx  Dressing Present Dressing Present : No  Dressing Status    Wound Offloading Wound Offloading (Prevention Methods): Bed, pressure redistribution/air     INTAKE/OUPUT    Date 04/11/17 0700 - 04/12/17 0659 04/12/17 0700 - 04/13/17 0659   Shift 4055-3034 8524-9205 24 Hour Total 0700-1859 1900-0659 24 Hour Total   I  N  T  A  K  E   P.O. 480  480         P. O. 480  480       Shift Total  (mL/kg) 480  (4.5)  480  (4.5)      O  U  T  P  U  T   Urine  (mL/kg/hr)            Urine Occurrence(s) 3 x 2 x 5 x       Stool            Stool Occurrence(s) 0 x 0 x 0 x       Shift Total  (mL/kg)           480      Weight (kg) 105.7 105.7 105.7 105.7 105.7 105.7       Recommendations:  1. Patient needs and requests: pain medicine    2. Diet: diabetic    3. Pending tests/procedures: no     4. Functional Level/Equipment: 1 x person assist    5. Estimated Discharge Date: TDB Posted on Whiteboard in Patients Room: [facility] Safety Check    A safety check occurred in the patient's room between off going nurse and oncoming nurse listed above. The safety check included the below items  Area Items   H  High Alert Medications - Verify all high alert medication drips (heparin, PCA, etc.)   E  Equipment - Suction is set up for ALL patients (with vania)  - Red plugs utilized for all equipment (IV pumps, etc.)  - WOWs wiped down at end of shift.  - Room stocked with oxygen, suction, and other unit-specific supplies   A  Alarms - Bed alarm is set for fall risk patients  - Ensure chair alarm is in place and activated if patient is up in a chair   L  Lines - Check IV for any infiltration  - Krueger bag is empty if patient has a Krueger   - Tubing and IV bags are labeled   S  Safety   - Room is clean, patient is clean, and equipment is clean. - Hallways are clear from equipment besides carts. - Fall bracelet on for fall risk patients  - Ensure room is clear and free of clutter  - Suction is set up for ALL patients (with vania)  - Hallways are clear from equipment besides carts.    - Isolation precautions followed, supplies available outside room, sign posted

## 2017-04-13 LAB
GLUCOSE BLD STRIP.AUTO-MCNC: 105 MG/DL (ref 70–110)
GLUCOSE BLD STRIP.AUTO-MCNC: 149 MG/DL (ref 70–110)
GLUCOSE BLD STRIP.AUTO-MCNC: 153 MG/DL (ref 70–110)
GLUCOSE BLD STRIP.AUTO-MCNC: 171 MG/DL (ref 70–110)
HCT VFR BLD AUTO: 26.8 % (ref 35–45)
HGB BLD-MCNC: 8.7 G/DL (ref 12–16)

## 2017-04-13 RX ADMIN — OXYCODONE HYDROCHLORIDE AND ACETAMINOPHEN 1 TABLET: 7.5; 325 TABLET ORAL at 21:27

## 2017-04-13 RX ADMIN — NALOXEGOL OXALATE 25 MG: 25 TABLET, FILM COATED ORAL at 06:37

## 2017-04-13 RX ADMIN — OXYCODONE HYDROCHLORIDE AND ACETAMINOPHEN 1 TABLET: 7.5; 325 TABLET ORAL at 08:03

## 2017-04-13 RX ADMIN — OXYCODONE HYDROCHLORIDE AND ACETAMINOPHEN 1 TABLET: 7.5; 325 TABLET ORAL at 04:00

## 2017-04-13 RX ADMIN — OXYCODONE HYDROCHLORIDE AND ACETAMINOPHEN 1 TABLET: 7.5; 325 TABLET ORAL at 00:11

## 2017-04-13 RX ADMIN — OXYCODONE HYDROCHLORIDE AND ACETAMINOPHEN 1 TABLET: 7.5; 325 TABLET ORAL at 12:21

## 2017-04-13 RX ADMIN — INSULIN LISPRO 2 UNITS: 100 INJECTION, SOLUTION INTRAVENOUS; SUBCUTANEOUS at 16:56

## 2017-04-13 RX ADMIN — GABAPENTIN 400 MG: 400 CAPSULE ORAL at 18:28

## 2017-04-13 RX ADMIN — CYCLOBENZAPRINE HYDROCHLORIDE 5 MG: 5 TABLET, FILM COATED ORAL at 14:22

## 2017-04-13 RX ADMIN — DOCUSATE SODIUM 100 MG: 100 CAPSULE, LIQUID FILLED ORAL at 08:04

## 2017-04-13 RX ADMIN — LACTOBACILLUS TAB 2 TABLET: TAB at 08:03

## 2017-04-13 RX ADMIN — GABAPENTIN 400 MG: 400 CAPSULE ORAL at 08:04

## 2017-04-13 RX ADMIN — STANDARDIZED SENNA CONCENTRATE AND DOCUSATE SODIUM 2 TABLET: 8.6; 5 TABLET, FILM COATED ORAL at 18:27

## 2017-04-13 RX ADMIN — LACTOBACILLUS TAB 2 TABLET: TAB at 18:27

## 2017-04-13 RX ADMIN — FOLIC ACID 1 MG: 1 TABLET ORAL at 08:04

## 2017-04-13 RX ADMIN — CYCLOBENZAPRINE HYDROCHLORIDE 5 MG: 5 TABLET, FILM COATED ORAL at 06:37

## 2017-04-13 RX ADMIN — INSULIN GLARGINE 20 UNITS: 100 INJECTION, SOLUTION SUBCUTANEOUS at 21:30

## 2017-04-13 RX ADMIN — ALLOPURINOL 100 MG: 100 TABLET ORAL at 08:04

## 2017-04-13 RX ADMIN — CARVEDILOL 6.25 MG: 3.12 TABLET, FILM COATED ORAL at 17:00

## 2017-04-13 RX ADMIN — CYCLOBENZAPRINE HYDROCHLORIDE 5 MG: 5 TABLET, FILM COATED ORAL at 21:28

## 2017-04-13 RX ADMIN — OXYCODONE HYDROCHLORIDE AND ACETAMINOPHEN 1 TABLET: 7.5; 325 TABLET ORAL at 16:44

## 2017-04-13 RX ADMIN — CARVEDILOL 6.25 MG: 3.12 TABLET, FILM COATED ORAL at 08:02

## 2017-04-13 NOTE — PROGRESS NOTES
[facility] PHYSICAL REHABILITATION  [address] [place] [address], [address]     INPATIENT REHABILITATION  DAILY PROGRESS NOTE     Date: 4/13/2017    Name: Nolan Goss Age / Sex: 77 y.o. / female   CSN: [id number] MRN: [id number]   [facility] Date: 4/6/2017 Length of Stay: 7 days     Primary Rehab Diagnosis: Impaired Mobility and ADLs secondary to Right iliopsoas muscle hematoma, secondary to supratherapeutic INR/anticoagulant therapy      Subjective:     Patient seen and examined. Blood pressure controlled. Blood sugars controlled. Patient's Complaint:   No significant medical complaints     Pain Control: ongoing significant pain in which is stable and controlled by current meds      Objective:     Vital Signs:  Patient Vitals for the past 24 hrs:   BP Temp Pulse Resp SpO2   04/13/17 0814 132/73 98.4 °F (36.9 °C) 92 19 97 %   04/12/17 1942 138/77 98.5 °F (36.9 °C) 90 18 100 %   04/12/17 1628 142/72 98.5 °F (36.9 °C) 82 18 98 %        Physical Exam:  GENERAL SURVEY: Patient is awake, alert, oriented x 3, sitting comfortably on the chair, not in acute respiratory distress. HEENT: pink palpebral conjunctivae, anicteric sclerae, no nasoaural discharge, moist oral mucosa  NECK: supple, no jugular venous distention, no palpable lymph nodes  CHEST/LUNGS: symmetrical chest expansion, good air entry, clear breath sounds  HEART: adynamic precordium, good S1 S2, no S3, regular rhythm, no murmurs  ABDOMEN: obese, bowel sounds appreciated, soft, non-tender  EXTREMITIES: pink nailbeds, no edema, full and equal pulses, no calf tenderness   NEUROLOGICAL EXAM: The patient is awake, alert and oriented x3, able to answer questions fairly appropriately, able to follow 1 and 2 step commands. Able to tell time from the wall clock. Cranial nerves II-XII are grossly intact. No gross sensory deficit.  Motor strength is 4/5 on BUE, 4-/5 on the left hip, 4+/5 on the left knee and left ankle, 2+/5 on the right hip (due to pain), 3+/5 on the right knee (due to right hip pain), 4/5 on the right ankle. Current Medications:  Current Facility-Administered Medications   Medication Dose Route Frequency    naloxegol (MOVANTIK) tablet 25 mg  25 mg Oral ACB    insulin glargine (LANTUS) injection 20 Units  20 Units SubCUTAneous QHS    cyclobenzaprine (FLEXERIL) tablet 5 mg  5 mg Oral Q8H    carvedilol (COREG) tablet 6.25 mg  6.25 mg Oral BID WITH MEALS    oxyCODONE-acetaminophen (PERCOCET 7.5) 7.5-325 mg per tablet 1 Tab  1 Tab Oral TID    oxyCODONE-acetaminophen (PERCOCET 7.5) 7.5-325 mg per tablet 1 Tab  1 Tab Oral Q4H PRN    gabapentin (NEURONTIN) capsule 400 mg  400 mg Oral BID    ondansetron hcl (ZOFRAN) tablet 4 mg  4 mg Oral TID PRN    allopurinol (ZYLOPRIM) tablet 100 mg  100 mg Oral DAILY    docusate sodium (COLACE) capsule 100 mg  100 mg Oral DAILY AFTER BREAKFAST    senna-docusate (PERICOLACE) 8.6-50 mg per tablet 2 Tab  2 Tab Oral PCD    acetaminophen (TYLENOL) tablet 650 mg  650 mg Oral Q4H PRN    bisacodyl (DULCOLAX) tablet 10 mg  10 mg Oral Q48H PRN    insulin lispro (HUMALOG) injection   SubCUTAneous TIDAC    folic acid (FOLVITE) tablet 1 mg  1 mg Oral DAILY    glucose chewable tablet 16 g  4 Tab Oral PRN    glucagon (GLUCAGEN) injection 1 mg  1 mg IntraMUSCular PRN    dextrose (D50W) injection syrg 12.5-25 g  25-50 mL IntraVENous PRN    Lactobacillus Acidoph & Bulgar (FLORANEX) tablet 2 Tab  2 Tab Oral BID       Allergies:   Allergies   Allergen Reactions    Vancomycin Itching    Ampicillin Itching    Bactrim [Sulfamethoxazole-Trimethoprim] Unknown (comments)    Blueberry Swelling     Causes throat swelling    Ciprofloxacin Itching    Codeine Other (comments)     Jumpy feeling    Crestor [Rosuvastatin] Itching    Darvocet A500 [Propoxyphene N-Acetaminophen] Itching    Demerol [Meperidine] Itching    Levaquin [Levofloxacin] Itching    Lipitor [Atorvastatin] Myalgia    Magnesium Oxide Itching nausea    Minocin [Minocycline] Unknown (comments)    Pcn [Penicillins] Itching    Pravachol [Pravastatin] Swelling     Swelling in mouth     Sulfa (Sulfonamide Antibiotics) Itching    Ultracet [Tramadol-Acetaminophen] Itching    Vicodin [Hydrocodone-Acetaminophen] Unknown (comments)    Vytorin 10-10 [Ezetimibe-Simvastatin] Myalgia    Percodan [Oxycodone Hcl-Oxycodone-Asa] Itching       Functional Progress:    OCCUPATIONAL THERAPY    ON ADMISSION MOST RECENT   Eating  Functional Level: 7   Eating  Functional Level: 7     Grooming  Functional Level: 5   Grooming  Functional Level: 5     Bathing  Functional Level: 3   Bathing  Functional Level: 5     Upper Body Dressing  Functional Level: 5   Upper Body Dressing  Functional Level: 5     Lower Body Dressing  Functional Level: 3   Lower Body Dressing  Functional Level: 5     Toileting  Functional Level: 4   Toileting  Functional Level: 5     Toilet Transfers  Toilet Transfer Score: 4   Toilet Transfers  Toilet Transfer Score: 4     Tub /Shower Transfers  Tub/Shower Transfer Score: 0   Tub/Shower Transfers  Tub/Shower Transfer Score: 5       Legend:   7 - Independent   6 - Modified Independent   5 - Standby Assistance / Supervision / Set-up   4 - Minimum Assistance / Contact Guard Assistance   3 - Moderate Assistance   2 - Maximum Assistance   1 - Total Assistance / Dependent       Lab/Data Review:  Recent Results (from the past 24 hour(s))   GLUCOSE, POC    Collection Time: 04/12/17  4:52 PM   Result Value Ref Range    Glucose (POC) 137 (H) 70 - 110 mg/dL   GLUCOSE, POC    Collection Time: 04/12/17  9:59 PM   Result Value Ref Range    Glucose (POC) 162 (H) 70 - 110 mg/dL   HGB & HCT    Collection Time: 04/13/17  6:30 AM   Result Value Ref Range    HGB 8.7 (L) 12.0 - 16.0 g/dL    HCT 26.8 (L) 35.0 - 45.0 %   GLUCOSE, POC    Collection Time: 04/13/17  8:34 AM   Result Value Ref Range    Glucose (POC) 105 70 - 110 mg/dL   GLUCOSE, POC    Collection Time: 04/13/17 12:02 PM   Result Value Ref Range    Glucose (POC) 149 (H) 70 - 110 mg/dL       Estimated Glomerular Filtration Rate:  CKD-EPI:   On admission, estimated GFR was 53.5 mL/min/1.73m2 based on a Creatinine of 1.22 mg/dl. Most recent estimated GFR was 60.6 mL/min/1.73m2 based on a Creatinine of 1.10 mg/dl. MDRD:   On admission, estimated GFR was 56.7 mL/min/1.73m2 based on a Creatinine of 1.22 mg/dl. Most recent estimated GFR was 63.9 mL/min/1.73m based on a Creatinine of 1.10 mg/dl. 2. Assessment:     Primary Rehabilitation Diagnosis  1. Impaired Mobility and ADLs  2. Right iliopsoas muscle hematoma, secondary to supratherapeutic INR/anticoagulant therapy      Comorbidities   Nonischemic cardiomyopathy    History of complete heart block    Biventricular implantable cardioverter-defibrillator in situ    Left bundle branch block (LBBB) on electrocardiogram    Type 2 diabetes mellitus with diabetic neuropathy    Dyslipidemia    Diabetic neuropathy associated with type 2 diabetes mellitus    Obstructive sleep apnea on CPAP    History of AICD generator infection     Difficult airway for intubation    Benign hypertensive heart disease with systolic CHF, NYHA class 2     Decreased calculated glomerular filtration rate (GFR)    Chronic anemia    History of deep venous thrombosis    Anticoagulated on Coumadin    Pacemaker twiddler's syndrome    Chronic systolic heart failure     Obesity (BMI 35.0-39.9 without comorbidity)     History of pyelonephritis    History of Coumadin therapy    Gout       Plan:     1. Justification for continued stay: Good progression towards established rehabilitation goals. 2. Medical Issues being followed closely:    [x]  Fall and safety precautions     []  Wound Care     [x]  Bowel and Bladder Function     [x]  Fluid Electrolyte and Nutrition Balance     [x]  Pain Control      3.  Issues that 24 hour rehabilitation nursing is following:    [x]  Fall and safety precautions     []  Wound Care     [x]  Bowel and Bladder Function     [x]  Fluid Electrolyte and Nutrition Balance     [x]  Pain Control      [x]  Assistance with and education on in-room safety with transfers to and from the bed, wheelchair, toilet and shower. 4. Acute rehabilitation plan of care:    [x]  Continue current care and rehab. [x]  Physical Therapy           [x]  Occupational Therapy           []  Speech Therapy     []  Hold Rehab until further notice     5. Medications:    [x]  MAR Reviewed     [x]  Continue Present Medications     6. DVT Prophylaxis:      []  Lovenox     []  Unfractionated Heparin     []  Coumadin     []  Xarelto     [x]  MIMI Stockings     [x]  Sequential Compression Device     []  None     7. Orders:   > Right iliopsoas muscle hematoma, secondary to supratherapeutic INR/anticoagulant therapy   > CT scan of the abdomen and pelvis (3/30/2017) showed an asymmetrically enlarged right iliopsoas muscle compared to the left extending to the right groin region with evidence of intramuscular hematoma in different stage; this is new compared to the recent CT 3/16/17; mild fatty stranding noted in the right pericolonic gutter and the right groin; no additional hematoma or other significant injury seen. > CT scan of the abdomen and pelvis (4/02/2017) showed redemonstration of prominent hematoma within the right psoas muscle, essentially unchanged as compared to previous study on 3/13/2017; around right psoas muscle there are are some hazy densities, indicating mild reactive or congestive changes similar to previous study; otherwise, there is no definable confluent inflammatory phlegmon around right psoas muscle; there is no evidence of hematoma in left psoas muscle or in rest of the retroperitoneum.    > Coumadin had been discontinued on 3/30/2017   > INR (4/6/2017) = 1.3   > Hgb/Hct (4/7/2017) = 8.8/26.5    > Hgb/Hct (4/10/2017) = 9.0/27.2    > Hgb/Hct (4/13/2017) = 8.7/26.8    > Benign hypertensive heart disease with chronic systolic heart failure   > On 4/10/2017, decreased Carvedilol from 12.5 mg to 6.25 mg PO BID with meals (8AM, 5PM)   > Continue Carvedilol 6.25 mg PO BID with meals (8AM, 5PM)     > History of pyelonephritis   > Patient had completed a 5-day treatment course of Aztreonam during his stay at [facility]    > On 4/7/2017, patient was started on Floranex 2 tabs PO BID    > Continue Floranex 2 tabs PO BID     > Type 2 diabetes mellitus with diabetic neuropathy   > On 4/8/2017, decreased Lantus from 24 units SC BID to 25 units SC q HS   > On 4/12/2017, decreased Lantus from 25 units to 20 units SC q HS   > Continue:    > Lantus 20 units SC q HS    > Humalog insulin sliding scale SC TID AC only   > Resume Sitagliptin 50 mg Po once daily     > Gout / Hyperuricemia    > Prior to admission to [facility], the patient was on Allopurinol 100 mg PO BID   > Uric acid (4/7/2017) = 3.8   > On 4/7/2017, decreased Allopurinol from 100 mg PO BID to 100 mg PO once daily   > Continue Allopurinol 100 mg PO once daily     > Constipation   > On 4/10/2017, discontinued Polyethylene glycol 17 grams in 8 oz water PO once daily q 7PM    > Add Naloxegol 25 mg PO once daily before breakfast   > Continue:    > Docusate sodium 100 mg PO once daily after breakfast    > Pericolace 2 tabs PO once daily after dinner     > Analgesia   > On 4/10/2017:    > Increased Gabapentin from 300 mg to 400 mg PO BID    > Increased Percocet 5/325 to Percocet 7.5/325 1 tab PO TID (8AM, 12PM, 4PM)    > Increased Percocet 5/325 to Percocet 7.5/325 1 tab PO q 4 hr PRN for pain level greater than 4/10 (from 8PM to 4AM only)    > On 4/11/2017, added Cyclobenzaprine 5 mg PO q 8 hr  > Continue:    > Acetaminophen 650 mg PO q 4 hr PRN for pain level less than 5/10    > Cyclobenzaprine 5 mg PO q 8 hr    > Gabapentin 400 mg PO BID    > Percocet 7.5/325 1 tab PO TID (8AM, 12PM, 4PM)    > Percocet 7.5/325 1 tab PO q 4 hr PRN for pain level greater than 4/10 (from 8PM to 4AM only)    > Warm compress to right thigh/hip PRN      8. Patient's progress in rehabilitation and medical issues discussed with the patient. All questions answered to the best of my ability. Care plan discussed with patient and nurse.       Signed:    Pepe De Santiago MD    April 13, 2017

## 2017-04-13 NOTE — ROUTINE PROCESS
[facility]    Bedside and Verbal shift change report given to Richard Carrizales RN (oncoming nurse) by Jose Alberto Lewis RN   (offgoing nurse). Report included the following information SBAR, Kardex, MAR and Recent Results. Situation:   Code Status: Full Code   Reason for Admission: Psoas Hematoma  Hospital Day: 7   Problem List:   Hospital Problems  Date Reviewed: 4/12/2017          Codes Class Noted POA    History of Coumadin therapy ICD-10-CM: Z79.01  ICD-9-CM: V58.61  Unknown Yes    Overview Signed 4/6/2017 10:35 PM by Naomie Henley MD     Anticoagulation for chronic atrial fibrillation; Discontinued on 3/30/2017             Decreased calculated glomerular filtration rate (GFR) ICD-10-CM: R94.4  ICD-9-CM: 794.4  3/30/2017 Yes    Overview Signed 4/6/2017  5:47 PM by Naomie Henley MD     Calculated GFR equivalent to that of CKD stage 3 = 30-59 ml/min             History of pyelonephritis ICD-10-CM: Z87.440  ICD-9-CM: V13.02  3/30/2017 Yes        Iliopsoas muscle hematoma ICD-10-CM: R28.39UH  ICD-9-CM: 924.00  3/30/2017 Yes        * (Principal)Psoas hematoma, right, secondary to anticoagulant therapy ICD-10-CM: S30. 1XXA  ICD-9-CM: 924.9, E934.2  3/30/2017 Yes        Generalized weakness ICD-10-CM: R53.1  ICD-9-CM: 780.79  3/30/2017 Yes        Impaired mobility and ADLs ICD-10-CM: Z74.09  ICD-9-CM: 799.89  3/30/2017 Yes        Benign hypertensive heart disease with systolic CHF, NYHA class 2 (HCC) (Chronic) ICD-10-CM: I11.0, I50.20  ICD-9-CM: 402.11, 428.20, 428.0  9/5/2012 Yes        Type 2 diabetes mellitus with diabetic neuropathy (HCC) (Chronic) ICD-10-CM: E11.40  ICD-9-CM: 250.60, 357.2  6/28/2011 Yes              Background:   Past Medical History:   Past Medical History:   Diagnosis Date    Benign hypertensive heart disease with systolic CHF, NYHA class 2 ([address] 9/5/2012    Biventricular implantable cardioverter-defibrillator in situ 04/28/2005    Upgraded to BiV AICD; gen change 4/2008; pocket revision 10/2009; Abdominal - done on 8/22/2012 by Dr. Garth Contreras Cardiac cath 08/15/1996    Patent coronaries. Elev LVEDP. EF 50-55%.  Cardiac echocardiogram 06/23/2015    Ltd study. EF 45-50%. Mild, diffuse hypk. Severe apical hypk. No mass or thrombus was clearly identified, although imaging was suboptimal.      Cardiac nuclear imaging test 06/19/2015    Fixed distal apical, distal septal defect more likely due to RV pacing than prior infarct. No ischemia. EF 46%. RWMA c/w RV pacing. Nondiagnostic EKG on pharm stress test.      Cardiovascular lower extremity venous duplex 09/04/2012    Acute, non-occlusive DVT in CFV on right. No DVT on left. No superficial thrombosis bilaterally.  Cardiovascular upper extremity venous duplex 08/27/2012    DVT in axillary vein on left. Left subclavian was not visualized.     Chronic anemia 9/5/2012    Chronic systolic heart failure (HCC)     Decreased calculated glomerular filtration rate (GFR) 3/30/2017    Calculated GFR equivalent to that of CKD stage 3 = 30-59 ml/min    Diabetic neuropathy associated with type 2 diabetes mellitus ([address] 6/28/2011    Difficult airway for intubation 08/22/2012    see anesthesia airway note    Dyslipidemia 6/28/2011    Gout     History of complete heart block 6/28/2011    History of Coumadin therapy     Anticoagulation for DVT of the LUE; Discontinued on 3/30/2017    History of deep venous thrombosis 9/5/2012    Left upper extremity    History of pyelonephritis 3/30/2017    Left bundle branch block (LBBB) on electrocardiogram 6/28/2011    Nonischemic cardiomyopathy ([address] 6/28/2011    Obesity (BMI 35.0-39.9 without comorbidity) ([address] 3/13/2017    Obstructive sleep apnea on CPAP 2/7/2012    Psoas hematoma, right, secondary to anticoagulant therapy 3/30/2017    Type 2 diabetes mellitus with diabetic neuropathy ([address] 6/28/2011      Patient taking anticoagulants no    Patient has a defibrillator: yes Assessment:   Changes in Assessment throughout shift: no     Patient has central line: no Reasons if yes: Insertion date: Last dressing date:   Patient has Krueger Cath: no Reasons if yes: Insertion date:     Last Vitals:     Vitals:    04/11/17 2003 04/12/17 0804 04/12/17 1628 04/12/17 1942   BP: 124/70 133/68 142/72 138/77   Pulse: 79 78 82 90   Resp: 19 20 18 18   Temp: 98.3 °F (36.8 °C) 98.7 °F (37.1 °C) 98.5 °F (36.9 °C) 98.5 °F (36.9 °C)   SpO2: 98% 99% 98% 100%   Weight:       Height:            PAIN    Pain Assessment    Pain Intensity 1: 7 (04/13/17 0400) Pain Intensity 1: 2 (12/29/14 1105)    Pain Location 1: Back, Groin Pain Location 1: Abdomen    Pain Intervention(s) 1: Medication (see MAR) Pain Intervention(s) 1: Medication (see MAR)  Patient Stated Pain Goal: 0 Patient Stated Pain Goal: 0  o Intervention effective: no    o Other actions taken for pain: pain medicine     Skin Assessment  Skin color Skin Color: Appropriate for ethnicity  Condition/Temperature Skin Condition/Temp: Warm  Integrity Skin Integrity: Intact  Turgor    Weekly Pressure Ulcer Documentation Weekly Pressure Ulcer Documentation: No Pressure Ulcer Noted-Pressure Ulcer Prevention Initiated  Wound Prevention & Protection Methods  Orientation of wound Orientation of Wound Prevention: Posterior  Location of Prevention Location of Wound Prevention: Sacrum/Coccyx  Dressing Present Dressing Present : No  Dressing Status    Wound Offloading Wound Offloading (Prevention Methods): Bed, pressure redistribution/air     INTAKE/OUPUT    Date 04/12/17 0700 - 04/13/17 0659 04/13/17 0700 - 04/14/17 0659   Shift 4447-6994 3247-3760 24 Hour Total 0700-1859 7518-9473 24 Hour Total   I  N  T  A  K  E   P.O. 620  620         P. O. 620  620       Shift Total  (mL/kg) 620  (5.9)  620  (5.9)      O  U  T  P  U  T   Urine  (mL/kg/hr)            Urine Occurrence(s) 2 x 4 x 6 x       Stool            Stool Occurrence(s) 0 x 0 x 0 x       Shift Total  (mL/kg)           620      Weight (kg) 105.7 105.7 105.7 105.7 105.7 105.7       Recommendations:  1. Patient needs and requests: pain medicine    2. Diet: diabetic    3. Pending tests/procedures: no     4. Functional Level/Equipment: 1 x person assist    5. Estimated Discharge Date: TDB Posted on Whiteboard in Patients Room: [facility] Safety Check    A safety check occurred in the patient's room between off going nurse and oncoming nurse listed above. The safety check included the below items  Area Items   H  High Alert Medications - Verify all high alert medication drips (heparin, PCA, etc.)   E  Equipment - Suction is set up for ALL patients (with vania)  - Red plugs utilized for all equipment (IV pumps, etc.)  - WOWs wiped down at end of shift.  - Room stocked with oxygen, suction, and other unit-specific supplies   A  Alarms - Bed alarm is set for fall risk patients  - Ensure chair alarm is in place and activated if patient is up in a chair   L  Lines - Check IV for any infiltration  - Krueger bag is empty if patient has a Krueger   - Tubing and IV bags are labeled   S  Safety   - Room is clean, patient is clean, and equipment is clean. - Hallways are clear from equipment besides carts. - Fall bracelet on for fall risk patients  - Ensure room is clear and free of clutter  - Suction is set up for ALL patients (with vania)  - Hallways are clear from equipment besides carts.    - Isolation precautions followed, supplies available outside room, sign posted

## 2017-04-13 NOTE — PROGRESS NOTES
completed follow up visit with patient and offered Pastoral care, see flow sheets for interventions. She is still on rehab. She was siting in a wheelchair and said she had a rough time at therapy. She was not talkative today and appeared tired.  provided emotional and spiritual support. Chaplains will continue to follow and will provide pastoral care  as needed or requested.    Torrez Hanna, [address]  Board Certified [address] [place] [address]  Office [phone]

## 2017-04-13 NOTE — PROGRESS NOTES
Problem: Self Care Deficits Care Plan (Adult)  Goal: *Therapy Goal (Edit Goal, Insert Text)  Long Term Goals (to be met upon discharge date) in order to increase pts functional independence and safety, and decrease burden of care:  1. Pt will perform grooming with independence. 2. Pt will perform UB bathing with modified independence. 3. Pt will perform LB bathing with modified independence. 4. Pt will perform shower transfer with modified independence. 5. Pt will perform UB dressing with independence. 6. Pt will perform LB dressing with modified independence. 7. Pt will perform toileting task with modified independence. 8. Pt will perform toilet transfer with modified independence. 9. Pt will perform an IADL task while standing with modified independence. Short Term Weekly Goals for (17 to 17) in order to increase pts functional independence and safety, and decrease burden of care:  1. Pt will perform grooming with independence. 2. Pt will perform UB bathing with modified independence. 3. Pt will perform LB bathing with supervision. 4. Pt will perform shower transfer with supervision. 5. Pt will perform UB dressing with independence. 6. Pt will perform LB dressing with supervision. 7. Pt will perform toileting task with supervision. 8. Pt will perform toilet transfer with supervision. OCCUPATIONAL THERAPY DAILY NOTE  Patient Name:Debby Gonzalez   Time In: 5734  Time Out: 1230     Time In: 1330  Time Out: 0     Medical Diagnosis:  Debility  Psoas hematoma, right, secondary to anticoagulant therapy Psoas hematoma, right, secondary to anticoagulant therapy      Pain at start of tx: 7-8/10 pain reported in back and leg  Pain at stop of tx: 7-8/10 pain reported in back and leg after ambulating around the kitchen     Patient identified with name and : yes  Subjective: Pt reported that she received a muscle relaxer before therapy and was feeling a little drowsy, but in less pain.  Pt stated she hasn't had the burning pain today which is making it easier to perform therapy. Pt stated she felt good about her sessions today and that she feels like she did better and was able to push herself to stand longer. Objective:      THERAPEUTIC ACTIVITY Daily Assessment     Pt stood for 7:42 using a RW to play DEV. Pt used both hand to shuffle and deal cards and used a card hidalgo to hold her cards infront of her. Pt played dev while holding onto a RW with one hand. Pt required min cues to place her feet even with each other, to put weight through both legs with her R heel down, and to stand up tall. Pt would step her L foot back to rest her right leg and was cued to step her L foot forward again. IADL Daily Assessment     Pt stood with a RW at a raised garden bed to plant tulips with SBA. Pt put her L hand down on the raised bed while digging holes, planting the tulips and covering the bulbs with dirt with her R hand. Pt lost her balance once and the walker fell forward. Pt was able to remain standing and was assisted to retrieve the walker off the ground and ambulated back to her w/c to sit and take a break. Pt then watered the tulips with a pitcher of water by using the bridging method to transfer to pitcher down the raised bed to reach other tulips. Pt retrieved items out of the cabinet to set the table and used the bridging method to transport the items to the table to set it using a RW and supervision. Pt ambulated to her w/c and sat to take a break then ambulated with a RW and supervision to collect the dishes, transport them to the sink using the bridging method, wash and dry them, and put them away in the cabinet. Pt performed task without any breaks. Pt required mod cues to correct forward leaning posture, to place feet flat and even with each other to distribute weight through both legs, and to hold onto the RW instead of leaning on the counter.        TOILETING Daily Assessment Pt performed clothing management and hygiene while standing with a RW and supervision. MOBILITY/TRANSFERS Daily Assessment      Pt ambulated from room to [facility] over toilet with a RW and supervision. Assessment: Pt demonstrated increased standing balance and tolerance while performing IADL tasks and therapeutic activity. Pt continues to require cues for posture, foot placement while standing, and weight distribution between both legs. Pt demonstrated increased motivation and required less cues for motivation. Plan of Care: Continue with POC to increase independence with I/ADLs and functional mobility.      MARKO Cavanaugh  4/13/2017

## 2017-04-13 NOTE — PROGRESS NOTES
Problem: Mobility Impaired (Adult and Pediatric)  Goal: *Acute Goals and Plan of Care (Insert Text)  Physical Therapy Short Goals  Initiated 4/7/2017 and to be accomplished within 7 day(s) 04/14/2017  1. Patient will move from supine to sit and sit to supine , scoot up and down and roll side to side in bed with maximal assistance. 2. Patient will transfer from bed to chair and chair to bed with CGA using the least restrictive device. 3. Patient will perform sit to stand with supervision/set-up. 4. Patient will ambulate with minimal assistance/contact guard assist for 25 feet with the least restrictive device.       Physical Therapy Long Goals  Initiated 4/7/2017 and to be accomplished within 21 day(s)  1. Patient will move from supine to sit and sit to supine , scoot up and down and roll side to side in bed with modified independence. 2. Patient will transfer from bed to chair and chair to bed with Mod I using the least restrictive device. 3. Patient will perform sit to stand with modified independence. 4. Patient will ambulate with supervision/set-up for 150 feet with the least restrictive device. 5. Patient will ascend/descend 3 stairs with 1 handrail(s) with minimal assistance/contact guard assist.   PT WEEKLY PROGRESS NOTE  Patient Name:Debby Gonzalez   Time In: 0800   Time Out: 0900   Time In: 0930   Time Out: 1002     Subjective: Patient reports constant burning pain 8/10 in lumbar spine and R anterior hip; reports nursing has already provided pain medication. Objective:   Patient performs functional assessment as noted below with increased focus on transition from long sitting to supine in bed via [address] [address] lowering. Pt lowers at a rate of 5 degrees/5 min initially from 45 degrees to ~12 degrees; upon second attempt the patient achieves supine positioning from 65 degrees with 10 degrees/1 min rate with 45 sec of supine laying.      Outcome Measures: FIM/MMT  Pain at start of tx:8/10  Pain at stop of tx:8/10  Pain increases to 10/10 with supine posturing  Patient identified with name and : YES                    AROM: generally decreased      FIM SCORES Initial Assessment Weekly Progress Assessment 2017   Bed/Chair/Wheelchair Transfers 1 (2/2 supine<>sit level) 5   Wheelchair Mobility 5 6   Walking [place] 0 (Pt unable) 2   Steps/Stairs 0 1   Please see [facility] Interdisciplinary Eval: Coordination/Balance Section for details regarding FIM score description. BED/CHAIR/WHEELCHAIR TRANSFERS Initial Assessment Weekly Progress Assessment 2017   Rolling Right 1 (Total assistance) (unable to assess 2/2 inability to lay supine) 5 (Supervision)   Rolling Left 1 (Total assistance) (unable to assess 2/2 inability to lay supine) 5 (Supervision)   Supine to Sit 1 (Total assistance) (unable to assess 2/2 inability to lay supine) 5 (Supervision)   W/ HOB elevated to ~45 degrees   Sit to Stand Minimal assistance Contact guard assistance   Sit to Supine 1 (Total assistance) (unable to assess 2/2 inability to lay supine) 5 (Supervision)   Transfer Type Other Other   Comments Patient performs stand step transfer with anterior aproach, B UE support, weight shift cues, step placement cues and controlled lowering. Patient with significant antalgia with R LE loading and forward flexed posturing requiring assist to achieve correct LE placement. Patient performs stand step transfer initially requiring cami x 1 for form adjustment and trunk stability during stepping portion of transfer. Patient with significant forward flexed posturing noting pain with attempts to achieve relative hip extension via trunk extension to achieve upright posturing.  Following therapeutic activity to improve psoas length (postural tolerance), patient demonstrates ability to perform STS transfer at [facility] level for form cues to encourage LE loading as well as CGA for stepping portion and controlled lowering cues for stand step transfer   Car Transfer Not tested Not tested   Car Type NA na       GROSS ASSESSMENT Weekly Progress Assessment 4/13/2017   AROM Generally decreased, functional   Strength Generally decreased, functional   Coordination Within functional limits   Tone Normal   Sensation Intact   PROM Generally decreased, functional       POSTURE Weekly Progress Assessment 4/13/2017   Posture (WDL) Exceptions to WDL   Posture Assessment Trunk flexion; Forward head;Rounded shoulders       WHEELCHAIR MOBILITY/MANAGEMENT Initial Assessment Weekly Progress Assessment 4/13/2017   Able to Propel 155 feet (B UE technique with SPV verbal cues for technique) 300 feet   Curbs/ramps assistance required 0 (Not tested) 1 (Total assistance)   Wheelchair set up assistance required 3 (Moderate assistance) 5 (supervision)   Wheelchair management Manages left brake, Manages right brake (w/ extender, verbal and visual cues + min a x placement) Manages left brake;Manages right brake;Manages left footrest;Manages right footrest       WALKING INDEPENDENCE Initial Assessment Weekly Progress Assessment 4/13/2017   Assistive device  (NA) Walker, rolling   Ambulation assistance - level surface NA CGA  SPV with intermittent tactile cues for postural adjustments    Distance 0 Feet (ft) 52 Feet (ft)   Comments NA Patient requires verbal cueing        GAIT Weekly Progress Assessment 4/13/2017   Gait Description (WDL) Exceptions to WDL   Gait Abnormalities Decreased step clearance;Circumduction       STEPS/STAIRS Initial Assessment Weekly Progress Assessment 4/13/2017   Steps/Stairs ambulated 0 (Pt unable) 3 (4\" steps x 2 sets)   Rail Use  (NA) Both   Comments Pt unable Non-reciprocal pattern L LE lead ascending; R LE lead descending SBA with close guarding and verbal cueing for posture   Curbs/Ramps NA NA              Assessment: Patient has achieved all STG's, however, patient demonstrates barriers to progression requiring alteration of LTG's as per POC.              Plan of Care: Please see Care Plan for updated LTGs. Family Training: ERIC Ospina.  Ron, PT DPT  4/13/2017

## 2017-04-13 NOTE — INTERDISCIPLINARY ROUNDS
[facility] PHYSICAL REHABILITATION  [address] [place] [address], [address]    INPATIENT REHABILITATION  TEAM CONFERENCE SUMMARY     Date of Conference: 4/14/2017    Name: Georges Dougherty Age / Sex: 77 y.o. / female   CSN: [id number] MRN: [id number]   [facility] Date: 4/6/2017 Length of Stay: 7 days     Primary Rehabilitation Diagnosis  1. Impaired Mobility and ADLs  2. Right iliopsoas muscle hematoma, secondary to supratherapeutic INR/anticoagulant therapy      Comorbidities   Nonischemic cardiomyopathy    History of complete heart block    Biventricular implantable cardioverter-defibrillator in situ    Left bundle branch block (LBBB) on electrocardiogram    Type 2 diabetes mellitus with diabetic neuropathy    Dyslipidemia    Diabetic neuropathy associated with type 2 diabetes mellitus    Obstructive sleep apnea on CPAP    History of AICD generator infection     Difficult airway for intubation    Benign hypertensive heart disease with systolic CHF, NYHA class 2     Decreased calculated glomerular filtration rate (GFR)    Chronic anemia    History of deep venous thrombosis    Anticoagulated on Coumadin    Pacemaker twiddler's syndrome    Chronic systolic heart failure     Obesity (BMI 35.0-39.9 without comorbidity)     History of pyelonephritis    History of Coumadin therapy    Gout         Therapy:     FIM SCORES Initial Assessment Weekly Progress Assessment 4/13/2017   Eating Functional Level: 7  7   Swallowing     Grooming 5  5   Bathing 3  5      Upper Body Dressing Functional Level: 5  Items Applied/Steps Completed: Pullover (4 steps), Bra (3 steps)  Comments: Pt able to manage clothing while seated in w/c. Pt would require assistance with obtaining clothing.   5   Lower Body Dressing Functional Level: 3  Items Applied/Steps Completed: Sock, right (1 step), Sock, left (1 step), Elastic waist pants (3 steps)  Comments: Pt required assistance with initially diane/doffing R sock 2/2 increased R groin/buttocks pain. During second attempt, pt was able to cross R LE to diane sock with increased time. Pt also diane/doffed L sock with crossing LE with more ease. Pt also managed pants over hips while standing with CGA for balance with x1 UE support on counter/walker with forward flexed posture. Pt required assistance with pulling pants over buttocks. 5   Toileting Functional Level: 4  Comments: Pt was able to manage pants over hips with SBA-CGA for safety/balance with RW. Pt able to also manage hygiene standing. 5   Bladder  level of assist 4 4   Bladder  accident frequency score 6 6   Bowel  level of assist 4 4   Bowel  accident frequency score 6     Toilet Transfer [place] Toilet Transfer Score: 4  Comments: Pt performed stand step transfer with RW from w/c to toilet with BSC at min A for sit to stand and CGA for transfer with forward flexed posture and cues for proper hand placement. 5   Tub/Shower Transfer [place] Tub or Shower Type: Shower  Tub/Shower Transfer Score: 0  Comments: NT 2/2 pt bathe at sink this session. 5      Comprehension Primary Mode of Comprehension: Auditory  Score: 6 Auditory  6   Expression Primary Mode of Expression: Verbal  Score: 6 Verbal  6   Social Interaction Score: 6 6   Problem Solving Score: 5 6   Memory Score: 5 6     FIM SCORES Initial Assessment Weekly Progress Assessment 4/13/2017   Bed/Chair/Wheelchair Transfers Transfer Type: Other  Other: Patient performs stand step transfer with anterior aproach, B UE support, weight shift cues, step placement cues and controlled lowering. Patient with significant antalgia with R LE loading and forward flexed posturing requiring assist to achieve correct LE placement. Transfer Assistance : 4 (Minimal assistance)  Sit to Stand Assistance: Minimal assistance  Car Transfers: Not tested  Car Type: NA Transfer Type:  Other  Other: Patient perfomrs stand step transfer initially requiring cami x 1 for form adjustment and trunk stability during stepping portion of transfer. Patient with significant forward flexed posturing noting pain with attempts to achieve relative hip extension via trunk extension to achieve upright posturing.  Following therapeutic activity to improve psoas length (postural tolerance), chey demonstrates ability to perform STS transfer at [facility] level for form cues to encourage LE loading as well as CGA for stepping portion and controlled lowering cues for stand step transfer  Transfer Assistance : 4 (Contact guard assistance)  Sit to Stand Assistance: Contact guard assistance  Car Transfers: Not tested  Car Type: na   Bed Mobility Rolling Right 1 (Total assistance) (unable to assess 2/2 inability to lay supine)   Rolling Left 1 (Total assistance) (unable to assess 2/2 inability to lay supine)   Supine to Sit 1 (Total assistance) (unable to assess 2/2 inability to lay supine)   Sit to Stand Minimal assistance   Sit to Supine 1 (Total assistance) (unable to assess 2/2 inability to lay supine)    Rolling Right   5 (Supervision)   Rolling Left   5 (Supervision)   Supine to Sit   5 (Supervision)   Sit to Stand   Contact guard assistance   Sit to Supine   5 (Supervision)      Locomotion (W/C) Able to Propel (ft): 155 feet (B UE technique with SPV verbal cues for technique)  Functional Level: 5  Curbs/Ramps Assist Required (FIM Score): 0 (Not tested)  Wheelchair Setup Assist Required : 3 (Moderate assistance)  Wheelchair Management: Manages left brake, Manages right brake (w/ extender, verbal and visual cues + min a x placement) Function 6  Setup Assistance  6 (Modified independent)      Locomotion (W/C distance) 155 Feet (B UE technique with SPV for intermittent safety/technique cu) 300 feet   Locomotion (Walk) 0 (Not tested) (Pt unable at this time) 4 (Contact guard assistance) (SPV with intermittent tactile cues for posture)  Walker, rolling   Locomotion (Walk dist.) 0 Feet (ft) 52 Feet (ft) Steps/Stairs Steps/Stairs Ambulated (#): 0 (Pt unable)  Level of Assist : 0 (Not tested)  Rail Use:  (NA) Steps/Stairs Ambulated (#): 3 4\"  Level of Assist : 0 (Not tested)  Rail Use:  (NA)         Nursing:     Neuro:   A&O x__4__                   Respiratory:   [x] WNL   [] O2   [] LPM ______   Other:  Peripheral Vascular:   [] TEDS present   [] Edema present ____ Grade  Cardiac:   [] WNL   [x] Other  Genitourinary:   [x] continent   [] incontinent   [] perez  Abdominal ___4/13____ LBM  GI: ___cardiac, diabetic____ Diet ___thin___ Liquids _____ tube feeds  Musculoskeletal: ____ ROM Transfers _____ Assistive Device Used  _min___ Level of Assistance  Skin Integumentary:   [x] Intact   [] Not Intact   __________Preventative Measures  Details______________________________________________________________  Pain: [] Controlled   [x] Not Controlled   Pain Meds:   [] Scheduled   [] PRN        Registered Dietitian / Nutrition:   Patient Vitals for the past 100 hrs:   % Diet Eaten   04/13/17 0953 0 %   04/12/17 1857 50 %   04/12/17 1316 25 %   04/12/17 1000 50 %   04/11/17 1800 65 %   04/11/17 1347 65 %   04/11/17 1000 65 %   04/10/17 1856 50 %   04/10/17 1319 25 %   04/10/17 0946 75 %   04/09/17 1828 85 %   04/09/17 1300 90 %   04/09/17 0900 25 %     Patient continues to have poor/fair appetite and meal intake. Fair intake of supplements. Supplements:          [x] Yes: glucerna Shake TID   [] No      Amount of supplement consumed:  50-75%      Intake/Output Summary (Last 24 hours) at 04/13/17 1246  Last data filed at 04/13/17 0953   Gross per 24 hour   Intake              498 ml   Output                0 ml   Net              498 ml                                Last bowel movement:  4/8      Interdisciplinary Team Goals:     1. Goal  Patient will negotiate 3 8\" steps with R HR with SPV    Barrier  pain, anxiety, decreased wb'ing tolerance R LE    Intervention Stair training     2.  Goal  Pt will perform functional transfers with Mod I.    Barrier  pain, decreased standing tolerance, forward leaning posture    Intervention  functional transfers, I/ADLs, functional mobility     3. Goal      Barrier      Intervention       4. Goal Pain will be controlled <7 at rest    Barrier  increased pain @ rest    Intervention  scheduled pain med, prn pain meds     5. Goal      Barrier      Intervention       6. Goal  Po intake of meals will meet >75% of patient estimated nutritional needs within the next 7 days. Outcome:  [] Met/Ongoing    [x]  Not Met    [] New/Initial Goal     Barrier  decreased appetite    Intervention  modified diet; nutrition supplement       Disposition / Discharge Planning: Follow-up therapy services:  Home Health Physical Therapy, and Occupational Therapy   DME recommendations:  RW, Shower Chair, Bedside Commode   Estimated discharge date:  4/19/2017   Discharge Location:  Private Residence         Electronic Signatures:      Signature Date Signed   Physical Therapist    Shelly Plunkett PT DPT  04/13/2017   Occupational Therapist    Muna Haas, [address] [facility], [employer] [facility] [address]. 4/13/17   Speech Therapist         Recreational Therapist    Flaquita Caballero, SHIRAS 4/13/17   Nursing   Haider Corrales RN 4/14/17   Dietitian    Brianna Adrian, [address] [place] [address]  4/13/17   Clinical Psychologist         Physician    Tamar Rivera. Cooper Ocampo MD  4/13/2017                  The above information has been reviewed with the patient in a language that they can understand. Opportunity for comments and questions has been provided and a signed attestation has been scanned into the \"media tab\" of the EMR.       Patient Signature: ______________________________________________________    Date Signed: __________________________________________________________

## 2017-04-14 LAB
GLUCOSE BLD STRIP.AUTO-MCNC: 105 MG/DL (ref 70–110)
GLUCOSE BLD STRIP.AUTO-MCNC: 125 MG/DL (ref 70–110)
GLUCOSE BLD STRIP.AUTO-MCNC: 135 MG/DL (ref 70–110)
GLUCOSE BLD STRIP.AUTO-MCNC: 160 MG/DL (ref 70–110)

## 2017-04-14 RX ORDER — LANOLIN ALCOHOL/MO/W.PET/CERES
3 CREAM (GRAM) TOPICAL
Status: DISCONTINUED | OUTPATIENT
Start: 2017-04-14 | End: 2017-04-20

## 2017-04-14 RX ORDER — BUMETANIDE 1 MG/1
TABLET ORAL
Qty: 180 TAB | Refills: 1 | Status: SHIPPED | OUTPATIENT
Start: 2017-04-14 | End: 2017-05-25

## 2017-04-14 RX ORDER — GABAPENTIN 300 MG/1
600 CAPSULE ORAL 2 TIMES DAILY
Status: DISCONTINUED | OUTPATIENT
Start: 2017-04-14 | End: 2017-04-18

## 2017-04-14 RX ORDER — MODAFINIL 100 MG/1
100 TABLET ORAL DAILY
Status: DISCONTINUED | OUTPATIENT
Start: 2017-04-15 | End: 2017-04-20

## 2017-04-14 RX ORDER — WARFARIN 2 MG/1
TABLET ORAL
Qty: 270 TAB | Refills: 1 | OUTPATIENT
Start: 2017-04-14 | End: 2017-04-20

## 2017-04-14 RX ADMIN — NALOXEGOL OXALATE 25 MG: 25 TABLET, FILM COATED ORAL at 06:51

## 2017-04-14 RX ADMIN — OXYCODONE HYDROCHLORIDE AND ACETAMINOPHEN 1 TABLET: 7.5; 325 TABLET ORAL at 07:57

## 2017-04-14 RX ADMIN — CYCLOBENZAPRINE HYDROCHLORIDE 5 MG: 5 TABLET, FILM COATED ORAL at 06:52

## 2017-04-14 RX ADMIN — LACTOBACILLUS TAB 2 TABLET: TAB at 17:41

## 2017-04-14 RX ADMIN — OXYCODONE HYDROCHLORIDE AND ACETAMINOPHEN 1 TABLET: 7.5; 325 TABLET ORAL at 20:44

## 2017-04-14 RX ADMIN — OXYCODONE HYDROCHLORIDE AND ACETAMINOPHEN 1 TABLET: 7.5; 325 TABLET ORAL at 15:50

## 2017-04-14 RX ADMIN — CARVEDILOL 6.25 MG: 3.12 TABLET, FILM COATED ORAL at 17:38

## 2017-04-14 RX ADMIN — GABAPENTIN 400 MG: 400 CAPSULE ORAL at 08:50

## 2017-04-14 RX ADMIN — STANDARDIZED SENNA CONCENTRATE AND DOCUSATE SODIUM 2 TABLET: 8.6; 5 TABLET, FILM COATED ORAL at 17:40

## 2017-04-14 RX ADMIN — LACTOBACILLUS TAB 2 TABLET: TAB at 08:51

## 2017-04-14 RX ADMIN — MELATONIN TAB 3 MG 3 MG: 3 TAB at 20:44

## 2017-04-14 RX ADMIN — CARVEDILOL 6.25 MG: 3.12 TABLET, FILM COATED ORAL at 07:57

## 2017-04-14 RX ADMIN — OXYCODONE HYDROCHLORIDE AND ACETAMINOPHEN 1 TABLET: 7.5; 325 TABLET ORAL at 03:22

## 2017-04-14 RX ADMIN — ALLOPURINOL 100 MG: 100 TABLET ORAL at 08:51

## 2017-04-14 RX ADMIN — INSULIN LISPRO 2 UNITS: 100 INJECTION, SOLUTION INTRAVENOUS; SUBCUTANEOUS at 12:00

## 2017-04-14 RX ADMIN — ONDANSETRON HYDROCHLORIDE 4 MG: 4 TABLET, FILM COATED ORAL at 17:37

## 2017-04-14 RX ADMIN — GABAPENTIN 600 MG: 300 CAPSULE ORAL at 17:41

## 2017-04-14 RX ADMIN — DOCUSATE SODIUM 100 MG: 100 CAPSULE, LIQUID FILLED ORAL at 08:51

## 2017-04-14 RX ADMIN — SITAGLIPTIN 50 MG: 50 TABLET, FILM COATED ORAL at 08:51

## 2017-04-14 RX ADMIN — OXYCODONE HYDROCHLORIDE AND ACETAMINOPHEN 1 TABLET: 7.5; 325 TABLET ORAL at 11:46

## 2017-04-14 RX ADMIN — INSULIN GLARGINE 20 UNITS: 100 INJECTION, SOLUTION SUBCUTANEOUS at 20:47

## 2017-04-14 RX ADMIN — FOLIC ACID 1 MG: 1 TABLET ORAL at 08:51

## 2017-04-14 NOTE — ROUTINE PROCESS
[facility]    Bedside and Verbal shift change report given to Iram Piña RN (oncoming nurse) by Jenn Nicole RN (offgoing nurse). Report included the following information SBAR, Kardex, MAR and Recent Results. Situation:   Code Status: Full Code   Reason for Admission: Psoas Hematoma  Hospital Day: 8   Problem List:   Hospital Problems  Date Reviewed: 4/14/2017          Codes Class Noted POA    History of Coumadin therapy ICD-10-CM: Z79.01  ICD-9-CM: V58.61  Unknown Yes    Overview Signed 4/6/2017 10:35 PM by Kandace Coleman MD     Anticoagulation for chronic atrial fibrillation; Discontinued on 3/30/2017             Decreased calculated glomerular filtration rate (GFR) ICD-10-CM: R94.4  ICD-9-CM: 794.4  3/30/2017 Yes    Overview Signed 4/6/2017  5:47 PM by Kandace Coleman MD     Calculated GFR equivalent to that of CKD stage 3 = 30-59 ml/min             History of pyelonephritis ICD-10-CM: Z87.440  ICD-9-CM: V13.02  3/30/2017 Yes        Iliopsoas muscle hematoma ICD-10-CM: O45.00UH  ICD-9-CM: 924.00  3/30/2017 Yes        * (Principal)Psoas hematoma, right, secondary to anticoagulant therapy ICD-10-CM: S30. 1XXA  ICD-9-CM: 924.9, E934.2  3/30/2017 Yes        Generalized weakness ICD-10-CM: R53.1  ICD-9-CM: 780.79  3/30/2017 Yes        Impaired mobility and ADLs ICD-10-CM: Z74.09  ICD-9-CM: 799.89  3/30/2017 Yes        Benign hypertensive heart disease with systolic CHF, NYHA class 2 (HCC) (Chronic) ICD-10-CM: I11.0, I50.20  ICD-9-CM: 402.11, 428.20, 428.0  9/5/2012 Yes        Type 2 diabetes mellitus with diabetic neuropathy (HCC) (Chronic) ICD-10-CM: E11.40  ICD-9-CM: 250.60, 357.2  6/28/2011 Yes              Background:   Past Medical History:   Past Medical History:   Diagnosis Date    Benign hypertensive heart disease with systolic CHF, NYHA class 2 ([address] 9/5/2012    Biventricular implantable cardioverter-defibrillator in situ 04/28/2005    Upgraded to BiV AICD; gen change 4/2008; pocket revision 10/2009; Abdominal - done on 8/22/2012 by Dr. Britt Oliver Cardiac cath 08/15/1996    Patent coronaries. Elev LVEDP. EF 50-55%.  Cardiac echocardiogram 06/23/2015    Ltd study. EF 45-50%. Mild, diffuse hypk. Severe apical hypk. No mass or thrombus was clearly identified, although imaging was suboptimal.      Cardiac nuclear imaging test 06/19/2015    Fixed distal apical, distal septal defect more likely due to RV pacing than prior infarct. No ischemia. EF 46%. RWMA c/w RV pacing. Nondiagnostic EKG on pharm stress test.      Cardiovascular lower extremity venous duplex 09/04/2012    Acute, non-occlusive DVT in CFV on right. No DVT on left. No superficial thrombosis bilaterally.  Cardiovascular upper extremity venous duplex 08/27/2012    DVT in axillary vein on left. Left subclavian was not visualized.     Chronic anemia 9/5/2012    Chronic systolic heart failure (HCC)     Decreased calculated glomerular filtration rate (GFR) 3/30/2017    Calculated GFR equivalent to that of CKD stage 3 = 30-59 ml/min    Diabetic neuropathy associated with type 2 diabetes mellitus ([address] 6/28/2011    Difficult airway for intubation 08/22/2012    see anesthesia airway note    Dyslipidemia 6/28/2011    Gout     History of complete heart block 6/28/2011    History of Coumadin therapy     Anticoagulation for DVT of the LUE; Discontinued on 3/30/2017    History of deep venous thrombosis 9/5/2012    Left upper extremity    History of pyelonephritis 3/30/2017    Left bundle branch block (LBBB) on electrocardiogram 6/28/2011    Nonischemic cardiomyopathy ([address] 6/28/2011    Obesity (BMI 35.0-39.9 without comorbidity) ([address] 3/13/2017    Obstructive sleep apnea on CPAP 2/7/2012    Psoas hematoma, right, secondary to anticoagulant therapy 3/30/2017    Type 2 diabetes mellitus with diabetic neuropathy ([address] 6/28/2011      Patient taking anticoagulants no    Patient has a defibrillator: yes Assessment:   Changes in Assessment throughout shift: no     Patient has central line: no Reasons if yes: Insertion date: Last dressing date:   Patient has Krueger Cath: no Reasons if yes: Insertion date:     Last Vitals:     Vitals:    04/13/17 1605 04/13/17 2127 04/14/17 0706 04/14/17 1533   BP: 126/67 110/62 125/64 111/59   Pulse: 85 67 71 75   Resp: 18 18 18 18   Temp: 98 °F (36.7 °C) 98.3 °F (36.8 °C) 96.6 °F (35.9 °C) 99.6 °F (37.6 °C)   SpO2: 98% 98% 100% 99%   Weight:       Height:            PAIN    Pain Assessment    Pain Intensity 1: 7 (04/14/17 1600) Pain Intensity 1: 2 (12/29/14 1105)    Pain Location 1: Back, Groin Pain Location 1: Abdomen    Pain Intervention(s) 1: Medication (see MAR) Pain Intervention(s) 1: Medication (see MAR)  Patient Stated Pain Goal: 0 Patient Stated Pain Goal: 0  o Intervention effective: no    o Other actions taken for pain: pain medicine     Skin Assessment  Skin color Skin Color: Appropriate for ethnicity  Condition/Temperature Skin Condition/Temp: Warm  Integrity Skin Integrity: Intact  Turgor    Weekly Pressure Ulcer Documentation Weekly Pressure Ulcer Documentation: No Pressure Ulcer Noted-Pressure Ulcer Prevention Initiated  Wound Prevention & Protection Methods  Orientation of wound Orientation of Wound Prevention: Posterior  Location of Prevention Location of Wound Prevention: Sacrum/Coccyx  Dressing Present Dressing Present : No  Dressing Status    Wound Offloading Wound Offloading (Prevention Methods): Bed, pressure redistribution/air     INTAKE/OUPUT    Date 04/13/17 1900 - 04/14/17 0659 04/14/17 0700 - 04/15/17 0659   Shift 5421-8771 24 Hour Total 6858-9046 9365-8201 24 Hour Total   I  N  T  A  K  E   P.O.  318 540  540      P. O.  318 540  540    Shift Total  (mL/kg)  318  (3) 540  (5.1)  540  (5.1)   O  U  T  P  U  T   Urine  (mL/kg/hr)           Urine Occurrence(s) 2 x 5 x 1 x  1 x    Stool           Stool Occurrence(s) 0 x 1 x 0 x  0 x    Shift Total  (mL/kg)        NET  318 540  540   Weight (kg) 105.7 105.7 105.7 105.7 105.7       Recommendations:  1. Patient needs and requests: pain medicine    2. Diet: diabetic    3. Pending tests/procedures: no     4. Functional Level/Equipment: 1 x person assist    5. Estimated Discharge Date: TDB Posted on Whiteboard in Patients Room: [facility] Safety Check    A safety check occurred in the patient's room between off going nurse and oncoming nurse listed above. The safety check included the below items  Area Items   H  High Alert Medications - Verify all high alert medication drips (heparin, PCA, etc.)   E  Equipment - Suction is set up for ALL patients (with vania)  - Red plugs utilized for all equipment (IV pumps, etc.)  - WOWs wiped down at end of shift.  - Room stocked with oxygen, suction, and other unit-specific supplies   A  Alarms - Bed alarm is set for fall risk patients  - Ensure chair alarm is in place and activated if patient is up in a chair   L  Lines - Check IV for any infiltration  - Krueger bag is empty if patient has a Krueger   - Tubing and IV bags are labeled   S  Safety   - Room is clean, patient is clean, and equipment is clean. - Hallways are clear from equipment besides carts. - Fall bracelet on for fall risk patients  - Ensure room is clear and free of clutter  - Suction is set up for ALL patients (with vania)  - Hallways are clear from equipment besides carts.    - Isolation precautions followed, supplies available outside room, sign posted

## 2017-04-14 NOTE — PROGRESS NOTES
[facility] PHYSICAL REHABILITATION  [address] [place] [address], [address]     INPATIENT REHABILITATION  DAILY PROGRESS NOTE     Date: 4/14/2017    Name: Sebas Hedrick Age / Sex: 77 y.o. / female   CSN: [id number] MRN: [id number]   [facility] Date: 4/6/2017 Length of Stay: 8 days     Primary Rehab Diagnosis: Impaired Mobility and ADLs secondary to Right iliopsoas muscle hematoma, secondary to supratherapeutic INR/anticoagulant therapy      Subjective:     Patient seen and examined. Blood pressure controlled. Blood sugars controlled. Team conference was held at bedside this AM.     Patient's Complaint:   Uncontrolled pain (\"burning sensation\")   Has problem sleeping at night    Pain Control: ongoing significant pain in which is stable and controlled by current meds      Objective:     Vital Signs:  Patient Vitals for the past 24 hrs:   BP Temp Pulse Resp SpO2   04/14/17 0706 125/64 96.6 °F (35.9 °C) 71 18 100 %   04/13/17 2127 110/62 98.3 °F (36.8 °C) 67 18 98 %   04/13/17 1605 126/67 98 °F (36.7 °C) 85 18 98 %        Physical Exam:  GENERAL SURVEY: Patient is awake, alert, oriented x 3, sitting comfortably on the chair, not in acute respiratory distress. HEENT: pink palpebral conjunctivae, anicteric sclerae, no nasoaural discharge, moist oral mucosa  NECK: supple, no jugular venous distention, no palpable lymph nodes  CHEST/LUNGS: symmetrical chest expansion, good air entry, clear breath sounds  HEART: adynamic precordium, good S1 S2, no S3, regular rhythm, no murmurs  ABDOMEN: obese, bowel sounds appreciated, soft, non-tender  EXTREMITIES: pink nailbeds, no edema, full and equal pulses, no calf tenderness   NEUROLOGICAL EXAM: The patient is awake, alert and oriented x3, able to answer questions fairly appropriately, able to follow 1 and 2 step commands. Able to tell time from the wall clock. Cranial nerves II-XII are grossly intact. No gross sensory deficit.  Motor strength is 4+/5 on BUE, 4-/5 on the left hip, 4+/5 on the left knee and left ankle, 2+/5 on the right hip (due to pain), 3+/5 on the right knee (due to right hip pain), 4/5 on the right ankle. Current Medications:  Current Facility-Administered Medications   Medication Dose Route Frequency    SITagliptin (JANUVIA) tablet 50 mg  50 mg Oral DAILY    naloxegol (MOVANTIK) tablet 25 mg  25 mg Oral ACB    insulin glargine (LANTUS) injection 20 Units  20 Units SubCUTAneous QHS    cyclobenzaprine (FLEXERIL) tablet 5 mg  5 mg Oral Q8H    carvedilol (COREG) tablet 6.25 mg  6.25 mg Oral BID WITH MEALS    oxyCODONE-acetaminophen (PERCOCET 7.5) 7.5-325 mg per tablet 1 Tab  1 Tab Oral TID    oxyCODONE-acetaminophen (PERCOCET 7.5) 7.5-325 mg per tablet 1 Tab  1 Tab Oral Q4H PRN    gabapentin (NEURONTIN) capsule 400 mg  400 mg Oral BID    ondansetron hcl (ZOFRAN) tablet 4 mg  4 mg Oral TID PRN    allopurinol (ZYLOPRIM) tablet 100 mg  100 mg Oral DAILY    docusate sodium (COLACE) capsule 100 mg  100 mg Oral DAILY AFTER BREAKFAST    senna-docusate (PERICOLACE) 8.6-50 mg per tablet 2 Tab  2 Tab Oral PCD    acetaminophen (TYLENOL) tablet 650 mg  650 mg Oral Q4H PRN    bisacodyl (DULCOLAX) tablet 10 mg  10 mg Oral Q48H PRN    insulin lispro (HUMALOG) injection   SubCUTAneous TIDAC    folic acid (FOLVITE) tablet 1 mg  1 mg Oral DAILY    glucose chewable tablet 16 g  4 Tab Oral PRN    glucagon (GLUCAGEN) injection 1 mg  1 mg IntraMUSCular PRN    dextrose (D50W) injection syrg 12.5-25 g  25-50 mL IntraVENous PRN    Lactobacillus Acidoph & Bulgar (FLORANEX) tablet 2 Tab  2 Tab Oral BID       Allergies:   Allergies   Allergen Reactions    Vancomycin Itching    Ampicillin Itching    Bactrim [Sulfamethoxazole-Trimethoprim] Unknown (comments)    Blueberry Swelling     Causes throat swelling    Ciprofloxacin Itching    Codeine Other (comments)     Jumpy feeling    Crestor [Rosuvastatin] Itching    Darvocet A500 [Propoxyphene N-Acetaminophen] Itching    Demerol [Meperidine] Itching    Levaquin [Levofloxacin] Itching    Lipitor [Atorvastatin] Myalgia    Magnesium Oxide Itching     nausea    Minocin [Minocycline] Unknown (comments)    Pcn [Penicillins] Itching    Pravachol [Pravastatin] Swelling     Swelling in mouth     Sulfa (Sulfonamide Antibiotics) Itching    Ultracet [Tramadol-Acetaminophen] Itching    Vicodin [Hydrocodone-Acetaminophen] Unknown (comments)    Vytorin 10-10 [Ezetimibe-Simvastatin] Myalgia    Percodan [Oxycodone Hcl-Oxycodone-Asa] Itching       Lab/Data Review:  Recent Results (from the past 24 hour(s))   GLUCOSE, POC    Collection Time: 04/13/17  4:42 PM   Result Value Ref Range    Glucose (POC) 171 (H) 70 - 110 mg/dL   GLUCOSE, POC    Collection Time: 04/13/17  9:06 PM   Result Value Ref Range    Glucose (POC) 153 (H) 70 - 110 mg/dL   GLUCOSE, POC    Collection Time: 04/14/17  7:05 AM   Result Value Ref Range    Glucose (POC) 125 (H) 70 - 110 mg/dL   GLUCOSE, POC    Collection Time: 04/14/17 12:39 PM   Result Value Ref Range    Glucose (POC) 160 (H) 70 - 110 mg/dL       Estimated Glomerular Filtration Rate:  CKD-EPI:   On admission, estimated GFR was 53.5 mL/min/1.73m2 based on a Creatinine of 1.22 mg/dl. Most recent estimated GFR was 60.6 mL/min/1.73m2 based on a Creatinine of 1.10 mg/dl. MDRD:   On admission, estimated GFR was 56.7 mL/min/1.73m2 based on a Creatinine of 1.22 mg/dl. Most recent estimated GFR was 63.9 mL/min/1.73m based on a Creatinine of 1.10 mg/dl. 2. Assessment:     Primary Rehabilitation Diagnosis  1. Impaired Mobility and ADLs  2.  Right iliopsoas muscle hematoma, secondary to supratherapeutic INR/anticoagulant therapy      Comorbidities   Nonischemic cardiomyopathy    History of complete heart block    Biventricular implantable cardioverter-defibrillator in situ    Left bundle branch block (LBBB) on electrocardiogram    Type 2 diabetes mellitus with diabetic neuropathy    Dyslipidemia    Diabetic neuropathy associated with type 2 diabetes mellitus    Obstructive sleep apnea on CPAP    History of AICD generator infection     Difficult airway for intubation    Benign hypertensive heart disease with systolic CHF, NYHA class 2     Decreased calculated glomerular filtration rate (GFR)    Chronic anemia    History of deep venous thrombosis    Anticoagulated on Coumadin    Pacemaker twiddler's syndrome    Chronic systolic heart failure     Obesity (BMI 35.0-39.9 without comorbidity)     History of pyelonephritis    History of Coumadin therapy    Gout       Plan:     1. Justification for continued stay: Good progression towards established rehabilitation goals. 2. Medical Issues being followed closely:    [x]  Fall and safety precautions     []  Wound Care     [x]  Bowel and Bladder Function     [x]  Fluid Electrolyte and Nutrition Balance     [x]  Pain Control      3. Issues that 24 hour rehabilitation nursing is following:    [x]  Fall and safety precautions     []  Wound Care     [x]  Bowel and Bladder Function     [x]  Fluid Electrolyte and Nutrition Balance     [x]  Pain Control      [x]  Assistance with and education on in-room safety with transfers to and from the bed, wheelchair, toilet and shower. 4. Acute rehabilitation plan of care:    [x]  Continue current care and rehab. [x]  Physical Therapy           [x]  Occupational Therapy           []  Speech Therapy     []  Hold Rehab until further notice     5. Medications:    [x]  MAR Reviewed     [x]  Continue Present Medications     6. DVT Prophylaxis:      []  Lovenox     []  Unfractionated Heparin     []  Coumadin     []  Xarelto     [x]  MIMI Stockings     [x]  Sequential Compression Device     []  None     7.  Orders:   > Right iliopsoas muscle hematoma, secondary to supratherapeutic INR/anticoagulant therapy   > CT scan of the abdomen and pelvis (3/30/2017) showed an asymmetrically enlarged right iliopsoas muscle compared to the left extending to the right groin region with evidence of intramuscular hematoma in different stage; this is new compared to the recent CT 3/16/17; mild fatty stranding noted in the right pericolonic gutter and the right groin; no additional hematoma or other significant injury seen. > CT scan of the abdomen and pelvis (4/02/2017) showed redemonstration of prominent hematoma within the right psoas muscle, essentially unchanged as compared to previous study on 3/13/2017; around right psoas muscle there are are some hazy densities, indicating mild reactive or congestive changes similar to previous study; otherwise, there is no definable confluent inflammatory phlegmon around right psoas muscle; there is no evidence of hematoma in left psoas muscle or in rest of the retroperitoneum.    > Coumadin had been discontinued on 3/30/2017   > INR (4/6/2017) = 1.3   > Hgb/Hct (4/7/2017) = 8.8/26.5    > Hgb/Hct (4/10/2017) = 9.0/27.2    > Hgb/Hct (4/13/2017) = 8.7/26.8      > Benign hypertensive heart disease with chronic systolic heart failure   > On 4/10/2017, decreased Carvedilol from 12.5 mg to 6.25 mg PO BID with meals (8AM, 5PM)   > Continue Carvedilol 6.25 mg PO BID with meals (8AM, 5PM)     > History of pyelonephritis   > Patient had completed a 5-day treatment course of Aztreonam during his stay at [facility]    > On 4/7/2017, patient was started on Floranex 2 tabs PO BID    > Continue Floranex 2 tabs PO BID     > Type 2 diabetes mellitus with diabetic neuropathy   > On 4/8/2017, decreased Lantus from 24 units SC BID to 25 units SC q HS   > On 4/12/2017, decreased Lantus from 25 units to 20 units SC q HS   > On 4/13/2017, resumed Sitagliptin 50 mg PO once daily   > Continue:    > Sitagliptin 50 mg PO once daily    > Lantus 20 units SC q HS    > Humalog insulin sliding scale SC TID AC only     > Gout / Hyperuricemia    > Prior to admission to [facility], the patient was on Allopurinol 100 mg PO BID   > Uric acid (4/7/2017) = 3.8   > On 4/7/2017, decreased Allopurinol from 100 mg PO BID to 100 mg PO once daily   > Continue Allopurinol 100 mg PO once daily     > Constipation   > On 4/10/2017, discontinued Polyethylene glycol 17 grams in 8 oz water PO once daily q 7PM    > Add Naloxegol 25 mg PO once daily before breakfast   > Continue:    > Docusate sodium 100 mg PO once daily after breakfast    > Pericolace 2 tabs PO once daily after dinner    > Neurocognitive modulation    > Modafinil 100 mg PO q AM   > Melatonin 3 mg PO q HS     > Analgesia   > On 4/10/2017:    > Increased Gabapentin from 300 mg to 400 mg PO BID    > Increased Percocet 5/325 to Percocet 7.5/325 1 tab PO TID (8AM, 12PM, 4PM)    > Increased Percocet 5/325 to Percocet 7.5/325 1 tab PO q 4 hr PRN for pain level greater than 4/10 (from 8PM to 4AM only)    > On 4/11/2017, added Cyclobenzaprine 5 mg PO q 8 hr   > Discontinue Cyclobenzaprine 5 mg PO q 8 hr   > Continue:    > Acetaminophen 650 mg PO q 4 hr PRN for pain level less than 5/10    > Increase Gabapentin from 400 mg to 600 mg PO BID    > Percocet 7.5/325 1 tab PO TID (8AM, 12PM, 4PM)    > Percocet 7.5/325 1 tab PO q 4 hr PRN for pain level greater than 4/10 (from 8PM to 4AM only)    > Warm compress to right thigh/hip PRN      8. Patient's progress in rehabilitation and medical issues discussed with the patient. All questions answered to the best of my ability. Care plan discussed with patient and nurse.       Signed:    Nancy Mosher MD    April 14, 2017

## 2017-04-14 NOTE — ROUTINE PROCESS
[facility]    Bedside and Verbal shift change report given to Padmini Lopez RN (oncoming nurse) by Stefano Mcnally RN (offgoing nurse). Report included the following information SBAR, Kardex, MAR and Recent Results. Situation:   Code Status: Full Code   Reason for Admission: Psoas Hematoma  Hospital Day: 8   Problem List:   Hospital Problems  Date Reviewed: 4/13/2017          Codes Class Noted POA    History of Coumadin therapy ICD-10-CM: Z79.01  ICD-9-CM: V58.61  Unknown Yes    Overview Signed 4/6/2017 10:35 PM by Malaika Naidu MD     Anticoagulation for chronic atrial fibrillation; Discontinued on 3/30/2017             Decreased calculated glomerular filtration rate (GFR) ICD-10-CM: R94.4  ICD-9-CM: 794.4  3/30/2017 Yes    Overview Signed 4/6/2017  5:47 PM by Malaika Naidu MD     Calculated GFR equivalent to that of CKD stage 3 = 30-59 ml/min             History of pyelonephritis ICD-10-CM: Z87.440  ICD-9-CM: V13.02  3/30/2017 Yes        Iliopsoas muscle hematoma ICD-10-CM: B46.71OV  ICD-9-CM: 924.00  3/30/2017 Yes        * (Principal)Psoas hematoma, right, secondary to anticoagulant therapy ICD-10-CM: S30. 1XXA  ICD-9-CM: 924.9, E934.2  3/30/2017 Yes        Generalized weakness ICD-10-CM: R53.1  ICD-9-CM: 780.79  3/30/2017 Yes        Impaired mobility and ADLs ICD-10-CM: Z74.09  ICD-9-CM: 799.89  3/30/2017 Yes        Benign hypertensive heart disease with systolic CHF, NYHA class 2 (HCC) (Chronic) ICD-10-CM: I11.0, I50.20  ICD-9-CM: 402.11, 428.20, 428.0  9/5/2012 Yes        Type 2 diabetes mellitus with diabetic neuropathy (HCC) (Chronic) ICD-10-CM: E11.40  ICD-9-CM: 250.60, 357.2  6/28/2011 Yes              Background:   Past Medical History:   Past Medical History:   Diagnosis Date    Benign hypertensive heart disease with systolic CHF, NYHA class 2 ([address] 9/5/2012    Biventricular implantable cardioverter-defibrillator in situ 04/28/2005    Upgraded to BiV AICD; gen change 4/2008; pocket revision 10/2009; Abdominal - done on 8/22/2012 by Dr. Micha Jacobson Cardiac cath 08/15/1996    Patent coronaries. Elev LVEDP. EF 50-55%.  Cardiac echocardiogram 06/23/2015    Ltd study. EF 45-50%. Mild, diffuse hypk. Severe apical hypk. No mass or thrombus was clearly identified, although imaging was suboptimal.      Cardiac nuclear imaging test 06/19/2015    Fixed distal apical, distal septal defect more likely due to RV pacing than prior infarct. No ischemia. EF 46%. RWMA c/w RV pacing. Nondiagnostic EKG on pharm stress test.      Cardiovascular lower extremity venous duplex 09/04/2012    Acute, non-occlusive DVT in CFV on right. No DVT on left. No superficial thrombosis bilaterally.  Cardiovascular upper extremity venous duplex 08/27/2012    DVT in axillary vein on left. Left subclavian was not visualized.     Chronic anemia 9/5/2012    Chronic systolic heart failure (HCC)     Decreased calculated glomerular filtration rate (GFR) 3/30/2017    Calculated GFR equivalent to that of CKD stage 3 = 30-59 ml/min    Diabetic neuropathy associated with type 2 diabetes mellitus ([address] 6/28/2011    Difficult airway for intubation 08/22/2012    see anesthesia airway note    Dyslipidemia 6/28/2011    Gout     History of complete heart block 6/28/2011    History of Coumadin therapy     Anticoagulation for DVT of the LUE; Discontinued on 3/30/2017    History of deep venous thrombosis 9/5/2012    Left upper extremity    History of pyelonephritis 3/30/2017    Left bundle branch block (LBBB) on electrocardiogram 6/28/2011    Nonischemic cardiomyopathy ([address] 6/28/2011    Obesity (BMI 35.0-39.9 without comorbidity) ([address] 3/13/2017    Obstructive sleep apnea on CPAP 2/7/2012    Psoas hematoma, right, secondary to anticoagulant therapy 3/30/2017    Type 2 diabetes mellitus with diabetic neuropathy ([address] 6/28/2011      Patient taking anticoagulants no    Patient has a defibrillator: yes     Assessment:   Changes in Assessment throughout shift: no     Patient has central line: no Reasons if yes: Insertion date: Last dressing date:   Patient has Krueger Cath: no Reasons if yes: Insertion date:     Last Vitals:     Vitals:    04/13/17 0814 04/13/17 1605 04/13/17 2127 04/14/17 0706   BP: 132/73 126/67 110/62 125/64   Pulse: 92 85 67 71   Resp: 19 18 18 18   Temp: 98.4 °F (36.9 °C) 98 °F (36.7 °C) 98.3 °F (36.8 °C) 96.6 °F (35.9 °C)   SpO2: 97% 98% 98% 100%   Weight:       Height:            PAIN    Pain Assessment    Pain Intensity 1: 10 (04/14/17 0746) Pain Intensity 1: 2 (12/29/14 1105)    Pain Location 1: Back, Groin Pain Location 1: Abdomen    Pain Intervention(s) 1: Medication (see MAR) Pain Intervention(s) 1: Medication (see MAR)  Patient Stated Pain Goal: 0 Patient Stated Pain Goal: 0  o Intervention effective: no    o Other actions taken for pain: pain medicine     Skin Assessment  Skin color Skin Color: Appropriate for ethnicity  Condition/Temperature Skin Condition/Temp: Warm  Integrity Skin Integrity: Intact  Turgor    Weekly Pressure Ulcer Documentation Weekly Pressure Ulcer Documentation: No Pressure Ulcer Noted-Pressure Ulcer Prevention Initiated  Wound Prevention & Protection Methods  Orientation of wound Orientation of Wound Prevention: Posterior  Location of Prevention Location of Wound Prevention: Sacrum/Coccyx  Dressing Present Dressing Present : No  Dressing Status    Wound Offloading Wound Offloading (Prevention Methods): Bed, pressure redistribution/air     INTAKE/OUPUT    Date 04/13/17 0700 - 04/14/17 0659 04/14/17 0700 - 04/15/17 0659   Shift 3203-4508 3803-0023 24 Hour Total 6065-6805 2593-3162 24 Hour Total   I  N  T  A  K  E   P.O. 318  318         P. O. 318  318       Shift Total  (mL/kg) 318  (3)  318  (3)      O  U  T  P  U  T   Urine  (mL/kg/hr)            Urine Occurrence(s) 3 x 2 x 5 x 0 x  0 x    Stool            Stool Occurrence(s) 1 x 0 x 1 x 0 x  0 x    Shift Total  (mL/kg)           318      Weight (kg) 105.7 105.7 105.7 105.7 105.7 105.7       Recommendations:  1. Patient needs and requests: pain medicine    2. Diet: diabetic    3. Pending tests/procedures: no     4. Functional Level/Equipment: 1 x person assist    5. Estimated Discharge Date: TDB Posted on Whiteboard in Patients Room: [facility] Safety Check    A safety check occurred in the patient's room between off going nurse and oncoming nurse listed above. The safety check included the below items  Area Items   H  High Alert Medications - Verify all high alert medication drips (heparin, PCA, etc.)   E  Equipment - Suction is set up for ALL patients (with vania)  - Red plugs utilized for all equipment (IV pumps, etc.)  - WOWs wiped down at end of shift.  - Room stocked with oxygen, suction, and other unit-specific supplies   A  Alarms - Bed alarm is set for fall risk patients  - Ensure chair alarm is in place and activated if patient is up in a chair   L  Lines - Check IV for any infiltration  - Krueger bag is empty if patient has a Krueger   - Tubing and IV bags are labeled   S  Safety   - Room is clean, patient is clean, and equipment is clean. - Hallways are clear from equipment besides carts. - Fall bracelet on for fall risk patients  - Ensure room is clear and free of clutter  - Suction is set up for ALL patients (with vania)  - Hallways are clear from equipment besides carts.    - Isolation precautions followed, supplies available outside room, sign posted

## 2017-04-14 NOTE — PROGRESS NOTES
Problem: Mobility Impaired (Adult and Pediatric)  Goal: *Acute Goals and Plan of Care (Insert Text)  Physical Therapy Short Goals  Initiated 4/7/2017 and to be accomplished within 7 day(s) MET (STG=LTG)  1. Patient will move from supine to sit and sit to supine , scoot up and down and roll side to side in bed with maximal assistance. 2. Patient will transfer from bed to chair and chair to bed with CGA using the least restrictive device. 3. Patient will perform sit to stand with supervision/set-up. 4. Patient will ambulate with minimal assistance/contact guard assist for 25 feet with the least restrictive device.       Physical Therapy Long Term Goals  Initiated 4/7/2017 and to be accomplished by 04/19/2017  1. Patient will move from supine to sit and sit to supine , scoot up and down and roll side to side in bed with modified independence. 2. Patient will transfer from bed to chair and chair to bed with SPV using the least restrictive device. 3. Patient will perform sit to stand with distant SPV. 4. Patient will ambulate with supervision/set-up for 100 feet with the least restrictive device. 5. Patient will ascend/descend 3 stairs with 1 handrail(s) with minimal assistance/contact guard assist.   PHYSICAL THERAPY DAILY NOTE  Patient [address]  Time In: 1101  Time Out: 4919  Patient Seen For: Wheelchair mobility;Transfer training; Therapeutic exercise;Patient education;Gait training;Balance activities  Diagnosis: Debility  Psoas hematoma, right, secondary to anticoagulant therapy Psoas hematoma, right, secondary to anticoagulant therapy  Precautions: Fall Risk     Subjective: Patient reports mild drowsiness following medication though she notes that the burning pain is the biggest limitation. D/W nursing; pain medication provided; Relayed pt's neurological pain concerns to Dr. Tai Ford who reports that he with review for possible adjustments.      Pain at start of tx:10/10  Pain at stop of tx:10/10 Patient identified with name and :YES         Objective:      BED/MAT MOBILITY Daily Assessment     Rolling Right : 5 (Supervision)  Rolling Left : 5 (Supervision)  Supine to Sit : 5 (Supervision) (additional time required)  Sit to Supine : 5 (Supervision) (Additional time required)          TRANSFERS Daily Assessment     Transfer Type: Other  Other: Patient performs stand step transfer with SBA with moderate verbal cueing for posturing and increased L step length; intermittent CGA for tactile cues to promote erect posture. Patient utilizes B UE support on RW; patient performs transfer x 5 in session. Transfer Assistance : 4 (Contact guard assistance)  Sit to Stand Assistance: Contact guard assistance  Car Transfers: Not tested  Car Type: NA          GAIT Daily Assessment     Amount of Assistance: 4 (Contact guard assistance)  Distance (ft): 40 Feet (ft) (25ft x 3 trials)  Assistive Device: Walker, rolling      Patient demonstrates forward flexed posturing with B genu valgus R>L and B ER'd LE's with decreased L step length as well as R stance time. Patient requires constant verbal cueing to increase L step length with good results and improve posturing with poor results.        BALANCE Daily Assessment     Sitting - Static: Good (unsupported)  Sitting - Dynamic: Good (unsupported)  Standing - Static: Fair  Standing - Dynamic : Impaired          WHEELCHAIR MOBILITY Daily Assessment     Able to Propel (ft): 300 feet  Functional Level: 6          LOWER EXTREMITY EXERCISES Daily Assessment     Extremity: Both  Exercise Type #1: Other (comment) (Short sit marching)  Sets Performed: 2  Reps Performed: 20  Level of Assist: Contact guard assistance (tactile cues for increased ROM)  Exercise Type #2: Other (comment) (LAQs)  Sets Performed: 2  Reps Performed: 20  Level of Assist: Supervision  Exercise Type #3: Other (comment) (semi-reclined SKTC R LE)  Sets Performed: 2  Reps Performed: 15  Level of Assist: Maximum assistance              Assessment: Patient severely limited by neurological pain today. Patient requires significant motivation to participate in session and requires modification of plan. Plan of Care: Cont current POC; focus on R LE hip mobility and ambulation to allow for safe and consistent ambulation in home (50 ft)     West Velasquez, PT DPT  4/14/2017

## 2017-04-14 NOTE — PROGRESS NOTES
Problem: Self Care Deficits Care Plan (Adult)  Goal: *Therapy Goal (Edit Goal, Insert Text)  Long Term Goals (to be met upon discharge date) in order to increase pts functional independence and safety, and decrease burden of care:  1. Pt will perform grooming with independence. 2. Pt will perform UB bathing with modified independence. 3. Pt will perform LB bathing with modified independence. 4. Pt will perform shower transfer with modified independence. 5. Pt will perform UB dressing with independence. 6. Pt will perform LB dressing with modified independence. 7. Pt will perform toileting task with modified independence. 8. Pt will perform toilet transfer with modified independence. 9. Pt will perform an IADL task while standing with modified independence. Short Term Weekly Goals for (17 to 17) in order to increase pts functional independence and safety, and decrease burden of care:  1. Pt will perform grooming with independence. (met)  2. Pt will perform UB bathing with modified independence. (met)  3. Pt will perform LB bathing with supervision. (met)  4. Pt will perform shower transfer with supervision. (met)  5. Pt will perform UB dressing with independence. 6. Pt will perform LB dressing with supervision. (met)  7. Pt will perform toileting task with supervision. (met)  8. Pt will perform toilet transfer with supervision. (met)  OT WEEKLY PROGRESS NOTE  Patient Mari Negron   Time Spent With Patient  Time In: 0800  Time Out: 0855  Patient Seen For[de-identified] ADLs    Time In: 1430  Time Out: 1500     Medical Diagnosis:  Debility  Psoas hematoma, right, secondary to anticoagulant therapy Psoas hematoma, right, secondary to anticoagulant therapy      Pain at start of tx: 9/10 pain  Pain at stop of tx: 9/10 pain     Patient identified with name and : yes  Subjective: Pt reported that she is having the burning pain again.  Pt reported that she has a shower bench build into her shower at home, but doesn't know the dimensions of it and if she will be able to get on and off of it. Objective: AM session performed ADL. PM session focused on shower transfers. Outcome Measures:                  AROM: [facility]         COGNITION/PERCEPTION Initial Assessment Weekly Progress Assessment 4/14/2017   Premorbid Reading Status Literate     Premorbid Writing Status  (NT)     Arousal/Alertness Generalized responses     Orientation Level Oriented X4     Visual Fields  [facility])     Praxis Intact     Body Scheme Appears intact     COMPREHENSION MODE Initial Assessment Weekly Progress Assessment 4/14/2017   Primary Mode of Comprehension Auditory     Hearing Aide None     Corrective Lenses       Score 6  7       EXPRESSION Initial Assessment Weekly Progress Assessment 4/14/2017   Primary Mode of Expression Verbal Verbal   Score 6 7   Comments           SOCIAL INTERACTION/ PRAGMATICS Initial Assessment Weekly Progress Assessment 4/14/2017   Score 6 6   Comments           PROBLEM SOLVING Initial Assessment Weekly Progress Assessment 4/14/2017   Score 5 6   Comments           MEMORY Initial Assessment Weekly Progress Assessment 4/14/2017   Score 5 6   Comments           EATING Initial Assessment Weekly Progress Assessment 4/14/2017   Functional Level 7 Feeding/Eating  Feeding/Eating Assistance: 7 (Independent)   Comments           GROOMING Initial Assessment Weekly Progress Assessment 4/14/2017   Functional Level 5 Grooming  Grooming Assistance : 7 (Independent)  Comments: Pt sat in w/c at sink to brush teeth and wash face independently. Tasks completed by patient Washed face     Comments Set-up of needed items as pt performed task while seated.          BATHING Initial Assessment Weekly Progress Assessment 4/14/2017   Functional Level 3 Functional Level: 6  Upper Body Bathing  Bathing Assistance, Upper: 6 (Modified independent)  Position Performed: Seated in chair  Adaptive Equipment: Shower chair  Comments: Pt performed all UB bathing while seated on a shower chair. Pt set up her own washcloth and water temperature. Lower Body Bathing  Bathing Assistance, Lower : 6 (Modified independent)  Adaptive Equipment: Grab bar;Long handled sponge  Position Performed: Seated in chair  Adaptive Equipment: Shower chair; Long handled sponge  Comments: Pt washed L leg and foot using cross method and washed R leg and foot using a long handled sponge. Pt washed linnette area and buttocks while seated leaning to the L and R.   Body parts patient bathed Abdomen, Arm, left, Arm, right, Buttocks, Chest, Lower leg and foot, left, Lower leg and foot, right, Linnette area, Thigh, left, Thigh, right     Comments Pt able to bathe UB and LB with increased time. Pt was seated in w/c to wash UB. Pt stood with CGA with RW/supporting UEs on counter with forward flexed trunk 2/2 increased R groin/buttocks pain as pt was able to bathe buttocks and linnette area. Pt then bathed B lower legs with crossing them while seated with increased time especially with R LE 2/2 pain. TUB/SHOWER TRANSFER INDEPENDENCE Initial Assessment Weekly Progress Assessment 4/14/2017   Score 0 Functional Transfers  Amount of Assistance Required: 5 (Supervision/setup)  Tub or Shower Type: Shower  Amount of Assistance Required: 5 (Supervision/setup)  Adaptive Equipment: Walker (comment); Shower chair with back   Comments NT 2/2 pt bathe at sink this session. Pt ambulated from room to shower chair with a RW and supervision. Pt had to step over a towel threshold and was able to clear the threshold with her walker and B legs without stepping on it or losing her balance. Pt worked on transferring to low surfaces using a 18\" mat table to make sure she is safe and able to get on and off a 20\" shower bench she has at home. Pt ambulated from w/c to mat table, sat down, stood up, and ambulated back to the w/c with no difficulty with a RW and supervision.      Pt worked on shower transfers over a threshold without bringing the RW into the shower like she will at home. Pt ambulated from w/c in room to parallel with the shower, reached with her R hand for the grab bar on the opposite side of the shower with her L hand on the walker and stepped laterally over the threshold and sat on the shower chair. Pt then stepped out of the shower using the same method. Pt performed the task 2x. UPPER BODY DRESSING/UNDRESSING Initial Assessment Weekly Progress Assessment 4/14/2017   Functional Level 5 Upper Body Dressing   Dressing Assistance : 6 (Modified independent)  Comments: Pt doffed pullover shirt while seated independently and donned tanktop and pullover shirt with extra time independently. Items applied/Steps completed Pullover (4 steps), Bra (3 steps)     Comments Pt able to manage clothing while seated in w/c. Pt would require assistance with obtaining clothing. LOWER BODY DRESSING/UNDRESSING Initial Assessment Weekly Progress Assessment 4/14/2017   Functional Level 3 Lower Body Dressing   Dressing Assistance : 6 (Modified independent)  Leg Crossed Method Used: Yes  Position Performed: Seated in chair;Standing  Adaptive Equipment Used: Long handled shoe horn;Grab bar;Reacher;Sock aid  Comments: Pt donned and doffed pants and underpants on and off of hip while standing using the grab bar for balance and doffed and donned pants and underpants over feet and legs while seated. Pt donned pants and underpants over R leg using a reacher. Pt donned L sock and shoe using leg cross method and used a sockaid and long handled shoe horn to diane her R sock and shoe. Pt required extra time and rest breaks. Items applied/Steps completed Sock, right (1 step), Sock, left (1 step), Elastic waist pants (3 steps)     Comments Pt required assistance with initially diane/doffing R sock 2/2 increased R groin/buttocks pain. During second attempt, pt was able to cross R LE to diane sock with increased time.  Pt also diane/doffed L sock with crossing LE with more ease. Pt also managed pants over hips while standing with CGA for balance with x1 UE support on counter/walker with forward flexed posture. Pt required assistance with pulling pants over buttocks. TOILETING Initial Assessment Weekly Progress Assessment 4/14/2017   Functional Level 4 Toileting  Toileting Assistance (FIM Score): 6 (Modified independent)   Comments Pt was able to manage pants over hips with SBA-CGA for safety/balance with RW. Pt able to also manage hygiene standing. Pt is able to stand with a RW to manage clothing and hygiene safely independently. TOILET TRANSFER INDEPENDENCE Initial Assessment Weekly Progress Assessment 4/14/2017   Transfer score 4 Functional Transfers  Amount of Assistance Required: 5 (Supervision/setup)  Tub or Shower Type: Shower  Amount of Assistance Required: 5 (Supervision/setup)  Adaptive Equipment: Walker (comment); Shower chair with back   Comments Pt performed stand step transfer with RW from w/c to toilet with BSC at min A for sit to stand and CGA for transfer with forward flexed posture and cues for proper hand placement. Pt requires supervision for toilet transfers due to safety. Assessment: Pt demonstrated increased independence with ADLs and increased safety with shower transfer. Pt continues to be limited by pain and requires increased time and rest breaks while completing activities though her tolerance and motivation have increased. Pt met 7 out of 8 STGs. Plan of Care: Please see Care Plan for updated LTGs.     Family Training:  MARKO Moore  4/14/2017

## 2017-04-15 LAB
GLUCOSE BLD STRIP.AUTO-MCNC: 133 MG/DL (ref 70–110)
GLUCOSE BLD STRIP.AUTO-MCNC: 144 MG/DL (ref 70–110)
GLUCOSE BLD STRIP.AUTO-MCNC: 152 MG/DL (ref 70–110)
GLUCOSE BLD STRIP.AUTO-MCNC: 161 MG/DL (ref 70–110)

## 2017-04-15 RX ORDER — INSULIN GLARGINE 100 [IU]/ML
15 INJECTION, SOLUTION SUBCUTANEOUS
Status: DISCONTINUED | OUTPATIENT
Start: 2017-04-15 | End: 2017-04-20

## 2017-04-15 RX ADMIN — MODAFINIL 100 MG: 100 TABLET ORAL at 07:59

## 2017-04-15 RX ADMIN — DOCUSATE SODIUM 100 MG: 100 CAPSULE, LIQUID FILLED ORAL at 08:00

## 2017-04-15 RX ADMIN — ALLOPURINOL 100 MG: 100 TABLET ORAL at 08:00

## 2017-04-15 RX ADMIN — GABAPENTIN 600 MG: 300 CAPSULE ORAL at 08:00

## 2017-04-15 RX ADMIN — INSULIN GLARGINE 15 UNITS: 100 INJECTION, SOLUTION SUBCUTANEOUS at 20:52

## 2017-04-15 RX ADMIN — GABAPENTIN 600 MG: 300 CAPSULE ORAL at 17:13

## 2017-04-15 RX ADMIN — ONDANSETRON HYDROCHLORIDE 4 MG: 4 TABLET, FILM COATED ORAL at 15:59

## 2017-04-15 RX ADMIN — ACETAMINOPHEN 650 MG: 325 TABLET ORAL at 15:14

## 2017-04-15 RX ADMIN — NALOXEGOL OXALATE 25 MG: 25 TABLET, FILM COATED ORAL at 06:30

## 2017-04-15 RX ADMIN — CARVEDILOL 6.25 MG: 3.12 TABLET, FILM COATED ORAL at 09:15

## 2017-04-15 RX ADMIN — INSULIN LISPRO 2 UNITS: 100 INJECTION, SOLUTION INTRAVENOUS; SUBCUTANEOUS at 12:50

## 2017-04-15 RX ADMIN — SITAGLIPTIN 50 MG: 50 TABLET, FILM COATED ORAL at 08:00

## 2017-04-15 RX ADMIN — OXYCODONE HYDROCHLORIDE AND ACETAMINOPHEN 1 TABLET: 7.5; 325 TABLET ORAL at 16:00

## 2017-04-15 RX ADMIN — ACETAMINOPHEN 650 MG: 325 TABLET ORAL at 06:29

## 2017-04-15 RX ADMIN — LACTOBACILLUS TAB 2 TABLET: TAB at 17:13

## 2017-04-15 RX ADMIN — STANDARDIZED SENNA CONCENTRATE AND DOCUSATE SODIUM 2 TABLET: 8.6; 5 TABLET, FILM COATED ORAL at 17:13

## 2017-04-15 RX ADMIN — LACTOBACILLUS TAB 2 TABLET: TAB at 08:06

## 2017-04-15 RX ADMIN — CARVEDILOL 6.25 MG: 3.12 TABLET, FILM COATED ORAL at 17:13

## 2017-04-15 RX ADMIN — INSULIN LISPRO 2 UNITS: 100 INJECTION, SOLUTION INTRAVENOUS; SUBCUTANEOUS at 18:03

## 2017-04-15 RX ADMIN — OXYCODONE HYDROCHLORIDE AND ACETAMINOPHEN 1 TABLET: 7.5; 325 TABLET ORAL at 20:51

## 2017-04-15 RX ADMIN — OXYCODONE HYDROCHLORIDE AND ACETAMINOPHEN 1 TABLET: 7.5; 325 TABLET ORAL at 12:07

## 2017-04-15 RX ADMIN — MELATONIN TAB 3 MG 3 MG: 3 TAB at 20:51

## 2017-04-15 RX ADMIN — OXYCODONE HYDROCHLORIDE AND ACETAMINOPHEN 1 TABLET: 7.5; 325 TABLET ORAL at 08:00

## 2017-04-15 RX ADMIN — FOLIC ACID 1 MG: 1 TABLET ORAL at 08:06

## 2017-04-15 NOTE — ROUTINE PROCESS
[facility]    Bedside and Verbal shift change report given to DANIEL Cain (oncoming nurse) by Fredis Lazo RN (offgoing nurse). Report included the following information SBAR, Kardex, MAR and Recent Results. Situation:   Code Status: Full Code   Reason for Admission: Psoas Hematoma  Hospital Day: 9   Problem List:   Hospital Problems  Date Reviewed: 4/14/2017          Codes Class Noted POA    History of Coumadin therapy ICD-10-CM: Z79.01  ICD-9-CM: V58.61  Unknown Yes    Overview Signed 4/6/2017 10:35 PM by Yoel Tomlinson MD     Anticoagulation for chronic atrial fibrillation; Discontinued on 3/30/2017             Decreased calculated glomerular filtration rate (GFR) ICD-10-CM: R94.4  ICD-9-CM: 794.4  3/30/2017 Yes    Overview Signed 4/6/2017  5:47 PM by Yoel Tomlinson MD     Calculated GFR equivalent to that of CKD stage 3 = 30-59 ml/min             History of pyelonephritis ICD-10-CM: Z87.440  ICD-9-CM: V13.02  3/30/2017 Yes        Iliopsoas muscle hematoma ICD-10-CM: S68.41OQ  ICD-9-CM: 924.00  3/30/2017 Yes        * (Principal)Psoas hematoma, right, secondary to anticoagulant therapy ICD-10-CM: S30. 1XXA  ICD-9-CM: 924.9, E934.2  3/30/2017 Yes        Generalized weakness ICD-10-CM: R53.1  ICD-9-CM: 780.79  3/30/2017 Yes        Impaired mobility and ADLs ICD-10-CM: Z74.09  ICD-9-CM: 799.89  3/30/2017 Yes        Benign hypertensive heart disease with systolic CHF, NYHA class 2 (HCC) (Chronic) ICD-10-CM: I11.0, I50.20  ICD-9-CM: 402.11, 428.20, 428.0  9/5/2012 Yes        Type 2 diabetes mellitus with diabetic neuropathy (HCC) (Chronic) ICD-10-CM: E11.40  ICD-9-CM: 250.60, 357.2  6/28/2011 Yes              Background:   Past Medical History:   Past Medical History:   Diagnosis Date    Benign hypertensive heart disease with systolic CHF, NYHA class 2 ([address] 9/5/2012    Biventricular implantable cardioverter-defibrillator in situ 04/28/2005    Upgraded to BiV AICD; gen change 4/2008; pocket revision 10/2009; Abdominal - done on 8/22/2012 by Dr. Garth Contreras Cardiac cath 08/15/1996    Patent coronaries. Elev LVEDP. EF 50-55%.  Cardiac echocardiogram 06/23/2015    Ltd study. EF 45-50%. Mild, diffuse hypk. Severe apical hypk. No mass or thrombus was clearly identified, although imaging was suboptimal.      Cardiac nuclear imaging test 06/19/2015    Fixed distal apical, distal septal defect more likely due to RV pacing than prior infarct. No ischemia. EF 46%. RWMA c/w RV pacing. Nondiagnostic EKG on pharm stress test.      Cardiovascular lower extremity venous duplex 09/04/2012    Acute, non-occlusive DVT in CFV on right. No DVT on left. No superficial thrombosis bilaterally.  Cardiovascular upper extremity venous duplex 08/27/2012    DVT in axillary vein on left. Left subclavian was not visualized.     Chronic anemia 9/5/2012    Chronic systolic heart failure (HCC)     Decreased calculated glomerular filtration rate (GFR) 3/30/2017    Calculated GFR equivalent to that of CKD stage 3 = 30-59 ml/min    Diabetic neuropathy associated with type 2 diabetes mellitus ([address] 6/28/2011    Difficult airway for intubation 08/22/2012    see anesthesia airway note    Dyslipidemia 6/28/2011    Gout     History of complete heart block 6/28/2011    History of Coumadin therapy     Anticoagulation for DVT of the LUE; Discontinued on 3/30/2017    History of deep venous thrombosis 9/5/2012    Left upper extremity    History of pyelonephritis 3/30/2017    Left bundle branch block (LBBB) on electrocardiogram 6/28/2011    Nonischemic cardiomyopathy ([address] 6/28/2011    Obesity (BMI 35.0-39.9 without comorbidity) ([address] 3/13/2017    Obstructive sleep apnea on CPAP 2/7/2012    Psoas hematoma, right, secondary to anticoagulant therapy 3/30/2017    Type 2 diabetes mellitus with diabetic neuropathy ([address] 6/28/2011      Patient taking anticoagulants no    Patient has a defibrillator: yes Assessment:   Changes in Assessment throughout shift: no     Patient has central line: no Reasons if yes: Insertion date: Last dressing date:   Patient has Krueger Cath: no Reasons if yes: Insertion date:     Last Vitals:     Vitals:    04/13/17 2127 04/14/17 0706 04/14/17 1533 04/14/17 2304   BP: 110/62 125/64 111/59 139/77   Pulse: 67 71 75 68   Resp: 18 18 18 18   Temp: 98.3 °F (36.8 °C) 96.6 °F (35.9 °C) 99.6 °F (37.6 °C) 97.8 °F (36.6 °C)   SpO2: 98% 100% 99% 99%   Weight:       Height:            PAIN    Pain Assessment    Pain Intensity 1: 0 (04/15/17 0400) Pain Intensity 1: 2 (12/29/14 1105)    Pain Location 1: Back, Groin Pain Location 1: Abdomen    Pain Intervention(s) 1: Medication (see MAR) Pain Intervention(s) 1: Medication (see MAR)  Patient Stated Pain Goal: 0 Patient Stated Pain Goal: 0  o Intervention effective: no    o Other actions taken for pain: pain medicine     Skin Assessment  Skin color Skin Color: Appropriate for ethnicity  Condition/Temperature Skin Condition/Temp: Dry, Warm  Integrity Skin Integrity: Intact  Turgor    Weekly Pressure Ulcer Documentation Weekly Pressure Ulcer Documentation: No Pressure Ulcer Noted-Pressure Ulcer Prevention Initiated  Wound Prevention & Protection Methods  Orientation of wound Orientation of Wound Prevention: Posterior  Location of Prevention Location of Wound Prevention: Sacrum/Coccyx  Dressing Present Dressing Present : No  Dressing Status    Wound Offloading Wound Offloading (Prevention Methods): Bed, pressure redistribution/air     INTAKE/OUPUT    Date 04/14/17 0700 - 04/15/17 0659 04/15/17 0700 - 04/16/17 0659   Shift 5946-4653 9411-0844 24 Hour Total 0700-1859 1900-0659 24 Hour Total   I  N  T  A  K  E   P. O. 540  540         P. O. 540  540       Shift Total  (mL/kg) 540  (5.1)  540  (5.1)      O  U  T  P  U  T   Urine  (mL/kg/hr)            Urine Occurrence(s) 1 x 0 x 1 x       Stool            Stool Occurrence(s) 0 x 0 x 0 x       Shift Total  (mL/kg)           540      Weight (kg) 105.7 105.7 105.7 105.7 105.7 105.7       Recommendations:  1. Patient needs and requests: pain medicine    2. Diet: diabetic    3. Pending tests/procedures: no     4. Functional Level/Equipment: 1 x person assist    5. Estimated Discharge Date: TDB Posted on Whiteboard in Patients Room: [facility] Safety Check    A safety check occurred in the patient's room between off going nurse and oncoming nurse listed above. The safety check included the below items  Area Items   H  High Alert Medications - Verify all high alert medication drips (heparin, PCA, etc.)   E  Equipment - Suction is set up for ALL patients (with vania)  - Red plugs utilized for all equipment (IV pumps, etc.)  - WOWs wiped down at end of shift.  - Room stocked with oxygen, suction, and other unit-specific supplies   A  Alarms - Bed alarm is set for fall risk patients  - Ensure chair alarm is in place and activated if patient is up in a chair   L  Lines - Check IV for any infiltration  - Krueger bag is empty if patient has a Krueger   - Tubing and IV bags are labeled   S  Safety   - Room is clean, patient is clean, and equipment is clean. - Hallways are clear from equipment besides carts. - Fall bracelet on for fall risk patients  - Ensure room is clear and free of clutter  - Suction is set up for ALL patients (with vania)  - Hallways are clear from equipment besides carts.    - Isolation precautions followed, supplies available outside room, sign posted

## 2017-04-15 NOTE — PROGRESS NOTES
[facility] PHYSICAL REHABILITATION  [address] [place] [address], [address]     INPATIENT REHABILITATION  DAILY PROGRESS NOTE     Date: 4/15/2017    Name: Nidia Mendez Age / Sex: 77 y.o. / female   CSN: [id number] MRN: [id number]   [facility] Date: 4/6/2017 Length of Stay: 9 days     Primary Rehab Diagnosis: Impaired Mobility and ADLs secondary to Right iliopsoas muscle hematoma, secondary to supratherapeutic INR/anticoagulant therapy      Subjective:     No new issues or problems reported. Blood pressure controlled. Blood sugars controlled.        Objective:     Vital Signs:  Patient Vitals for the past 24 hrs:   BP Temp Pulse Resp SpO2   04/14/17 2304 139/77 97.8 °F (36.6 °C) 68 18 99 %   04/14/17 1533 111/59 99.6 °F (37.6 °C) 75 18 99 %        Current Medications:  Current Facility-Administered Medications   Medication Dose Route Frequency    gabapentin (NEURONTIN) capsule 600 mg  600 mg Oral BID    modafinil (PROVIGIL) tablet 100 mg  100 mg Oral DAILY    melatonin tablet 3 mg  3 mg Oral QHS    SITagliptin (JANUVIA) tablet 50 mg  50 mg Oral DAILY    naloxegol (MOVANTIK) tablet 25 mg  25 mg Oral ACB    insulin glargine (LANTUS) injection 20 Units  20 Units SubCUTAneous QHS    carvedilol (COREG) tablet 6.25 mg  6.25 mg Oral BID WITH MEALS    oxyCODONE-acetaminophen (PERCOCET 7.5) 7.5-325 mg per tablet 1 Tab  1 Tab Oral TID    oxyCODONE-acetaminophen (PERCOCET 7.5) 7.5-325 mg per tablet 1 Tab  1 Tab Oral Q4H PRN    ondansetron hcl (ZOFRAN) tablet 4 mg  4 mg Oral TID PRN    allopurinol (ZYLOPRIM) tablet 100 mg  100 mg Oral DAILY    docusate sodium (COLACE) capsule 100 mg  100 mg Oral DAILY AFTER BREAKFAST    senna-docusate (PERICOLACE) 8.6-50 mg per tablet 2 Tab  2 Tab Oral PCD    acetaminophen (TYLENOL) tablet 650 mg  650 mg Oral Q4H PRN    bisacodyl (DULCOLAX) tablet 10 mg  10 mg Oral Q48H PRN    insulin lispro (HUMALOG) injection   SubCUTAneous TIDAC    folic acid (FOLVITE) tablet 1 mg 1 mg Oral DAILY    glucose chewable tablet 16 g  4 Tab Oral PRN    glucagon (GLUCAGEN) injection 1 mg  1 mg IntraMUSCular PRN    dextrose (D50W) injection syrg 12.5-25 g  25-50 mL IntraVENous PRN    Lactobacillus Acidoph & Bulgar (FLORANEX) tablet 2 Tab  2 Tab Oral BID       Allergies: Allergies   Allergen Reactions    Vancomycin Itching    Ampicillin Itching    Bactrim [Sulfamethoxazole-Trimethoprim] Unknown (comments)    Blueberry Swelling     Causes throat swelling    Ciprofloxacin Itching    Codeine Other (comments)     Jumpy feeling    Crestor [Rosuvastatin] Itching    Darvocet A500 [Propoxyphene N-Acetaminophen] Itching    Demerol [Meperidine] Itching    Levaquin [Levofloxacin] Itching    Lipitor [Atorvastatin] Myalgia    Magnesium Oxide Itching     nausea    Minocin [Minocycline] Unknown (comments)    Pcn [Penicillins] Itching    Pravachol [Pravastatin] Swelling     Swelling in mouth     Sulfa (Sulfonamide Antibiotics) Itching    Ultracet [Tramadol-Acetaminophen] Itching    Vicodin [Hydrocodone-Acetaminophen] Unknown (comments)    Vytorin 10-10 [Ezetimibe-Simvastatin] Myalgia    Percodan [Oxycodone Hcl-Oxycodone-Asa] Itching       Lab/Data Review:  Recent Results (from the past 24 hour(s))   GLUCOSE, POC    Collection Time: 04/14/17 12:39 PM   Result Value Ref Range    Glucose (POC) 160 (H) 70 - 110 mg/dL   GLUCOSE, POC    Collection Time: 04/14/17  4:26 PM   Result Value Ref Range    Glucose (POC) 105 70 - 110 mg/dL   GLUCOSE, POC    Collection Time: 04/14/17  8:46 PM   Result Value Ref Range    Glucose (POC) 135 (H) 70 - 110 mg/dL       Estimated Glomerular Filtration Rate:  CKD-EPI:   On admission, estimated GFR was 53.5 mL/min/1.73m2 based on a Creatinine of 1.22 mg/dl. Most recent estimated GFR was 60.6 mL/min/1.73m2 based on a Creatinine of 1.10 mg/dl. MDRD:   On admission, estimated GFR was 56.7 mL/min/1.73m2 based on a Creatinine of 1.22 mg/dl.    Most recent estimated GFR was 63.9 mL/min/1.73m based on a Creatinine of 1.10 mg/dl. 2. Assessment:     Primary Rehabilitation Diagnosis  1. Impaired Mobility and ADLs  2. Right iliopsoas muscle hematoma, secondary to supratherapeutic INR/anticoagulant therapy      Comorbidities   Nonischemic cardiomyopathy    History of complete heart block    Biventricular implantable cardioverter-defibrillator in situ    Left bundle branch block (LBBB) on electrocardiogram    Type 2 diabetes mellitus with diabetic neuropathy    Dyslipidemia    Diabetic neuropathy associated with type 2 diabetes mellitus    Obstructive sleep apnea on CPAP    History of AICD generator infection     Difficult airway for intubation    Benign hypertensive heart disease with systolic CHF, NYHA class 2     Decreased calculated glomerular filtration rate (GFR)    Chronic anemia    History of deep venous thrombosis    Anticoagulated on Coumadin    Pacemaker twiddler's syndrome    Chronic systolic heart failure     Obesity (BMI 35.0-39.9 without comorbidity)     History of pyelonephritis    History of Coumadin therapy    Gout       Plan:     1. Justification for continued stay: Good progression towards established rehabilitation goals. 2. Medical Issues being followed closely:    [x]  Fall and safety precautions     []  Wound Care     [x]  Bowel and Bladder Function     [x]  Fluid Electrolyte and Nutrition Balance     [x]  Pain Control      3. Issues that 24 hour rehabilitation nursing is following:    [x]  Fall and safety precautions     []  Wound Care     [x]  Bowel and Bladder Function     [x]  Fluid Electrolyte and Nutrition Balance     [x]  Pain Control      [x]  Assistance with and education on in-room safety with transfers to and from the bed, wheelchair, toilet and shower. 4. Acute rehabilitation plan of care:    [x]  Continue current care and rehab.            [x]  Physical Therapy           [x]  Occupational Therapy           []  Speech Therapy     []  Hold Rehab until further notice     5. Medications:    [x]  MAR Reviewed     [x]  Continue Present Medications     6. DVT Prophylaxis:      []  Lovenox     []  Unfractionated Heparin     []  Coumadin     []  Xarelto     [x]  MIMI Stockings     [x]  Sequential Compression Device     []  None     7. Orders:   > Right iliopsoas muscle hematoma, secondary to supratherapeutic INR/anticoagulant therapy   > CT scan of the abdomen and pelvis (3/30/2017) showed an asymmetrically enlarged right iliopsoas muscle compared to the left extending to the right groin region with evidence of intramuscular hematoma in different stage; this is new compared to the recent CT 3/16/17; mild fatty stranding noted in the right pericolonic gutter and the right groin; no additional hematoma or other significant injury seen. > CT scan of the abdomen and pelvis (4/02/2017) showed redemonstration of prominent hematoma within the right psoas muscle, essentially unchanged as compared to previous study on 3/13/2017; around right psoas muscle there are are some hazy densities, indicating mild reactive or congestive changes similar to previous study; otherwise, there is no definable confluent inflammatory phlegmon around right psoas muscle; there is no evidence of hematoma in left psoas muscle or in rest of the retroperitoneum.    > Coumadin had been discontinued on 3/30/2017   > INR (4/6/2017) = 1.3   > Hgb/Hct (4/7/2017) = 8.8/26.5    > Hgb/Hct (4/10/2017) = 9.0/27.2    > Hgb/Hct (4/13/2017) = 8.7/26.8      > Benign hypertensive heart disease with chronic systolic heart failure   > On 4/10/2017, decreased Carvedilol from 12.5 mg to 6.25 mg PO BID with meals (8AM, 5PM)   > Continue Carvedilol 6.25 mg PO BID with meals (8AM, 5PM)     > History of pyelonephritis   > Patient had completed a 5-day treatment course of Aztreonam during his stay at [facility]    > On 4/7/2017, patient was started on Floranex 2 tabs PO BID    > Continue Floranex 2 tabs PO BID     > Type 2 diabetes mellitus with diabetic neuropathy   > On 4/8/2017, decreased Lantus from 24 units SC BID to 25 units SC q HS   > On 4/12/2017, decreased Lantus from 25 units to 20 units SC q HS   > On 4/13/2017, resumed Sitagliptin 50 mg PO once daily   > Continue:    > Sitagliptin 50 mg PO once daily    > Decrease Lantus from 20 units to 15 units SC q HS    > Humalog insulin sliding scale SC TID AC only     > Gout / Hyperuricemia    > [facility], the patient was on Allopurinol 100 mg PO BID   > Uric acid (4/7/2017) = 3.8   > On 4/7/2017, decreased Allopurinol from 100 mg PO BID to 100 mg PO once daily   > Continue Allopurinol 100 mg PO once daily     > Constipation   > On 4/10/2017, discontinued Polyethylene glycol 17 grams in 8 oz water PO once daily q 7PM    > On 4/13/2017, added Naloxegol 25 mg PO once daily before breakfast   > Continue:    > Docusate sodium 100 mg PO once daily after breakfast    > Pericolace 2 tabs PO once daily after dinner    > Naloxegol 25 mg PO once daily before breakfast    > Neurocognitive modulation    > On 4/14/2017, patient was started on:    > Modafinil 100 mg PO q AM    > Melatonin 3 mg PO q HS   > Continue:    > Modafinil 100 mg PO q AM    > Melatonin 3 mg PO q HS     > Analgesia   > On 4/10/2017:    > Increased Gabapentin from 300 mg to 400 mg PO BID    > Increased Percocet 5/325 to Percocet 7.5/325 1 tab PO TID (8AM, 12PM, 4PM)    > Increased Percocet 5/325 to Percocet 7.5/325 1 tab PO q 4 hr PRN for pain level greater than 4/10 (from 8PM to 4AM only)    > On 4/11/2017, added Cyclobenzaprine 5 mg PO q 8 hr   > On 4/14/2017:    > Discontinued Cyclobenzaprine 5 mg PO q 8 hr    > Increased Gabapentin from 400 mg to 600 mg PO BID   > Continue:    > Acetaminophen 650 mg PO q 4 hr PRN for pain level less than 5/10    > Gabapentin 600 mg PO BID    > Percocet 7.5/325 1 tab PO TID (8AM, 12PM, 4PM)    > Percocet 7.5/325 1 tab PO q 4 hr PRN for pain level greater than 4/10 (from 8PM to 4AM only)    > Warm compress to right thigh/hip PRN      Signed:    Madonna Wolff MD    April 15, 2017

## 2017-04-15 NOTE — ROUTINE PROCESS
[facility]    Bedside and Verbal shift change report given to DANIEL Mancia (oncoming nurse) by Oz Carey RN (offgoing nurse). Report included the following information SBAR, Kardex, MAR and Recent Results. Situation:   Code Status: Full Code   Reason for Admission: Psoas Hematoma  Hospital Day: 9   Problem List:   Hospital Problems  Date Reviewed: 4/15/2017          Codes Class Noted POA    History of Coumadin therapy ICD-10-CM: Z79.01  ICD-9-CM: V58.61  Unknown Yes    Overview Signed 4/6/2017 10:35 PM by Aly Christina MD     Anticoagulation for chronic atrial fibrillation; Discontinued on 3/30/2017             Decreased calculated glomerular filtration rate (GFR) ICD-10-CM: R94.4  ICD-9-CM: 794.4  3/30/2017 Yes    Overview Signed 4/6/2017  5:47 PM by Aly Christina MD     Calculated GFR equivalent to that of CKD stage 3 = 30-59 ml/min             History of pyelonephritis ICD-10-CM: Z87.440  ICD-9-CM: V13.02  3/30/2017 Yes        Iliopsoas muscle hematoma ICD-10-CM: U38.86QR  ICD-9-CM: 924.00  3/30/2017 Yes        * (Principal)Psoas hematoma, right, secondary to anticoagulant therapy ICD-10-CM: S30. 1XXA  ICD-9-CM: 924.9, E934.2  3/30/2017 Yes        Generalized weakness ICD-10-CM: R53.1  ICD-9-CM: 780.79  3/30/2017 Yes        Impaired mobility and ADLs ICD-10-CM: Z74.09  ICD-9-CM: 799.89  3/30/2017 Yes        Benign hypertensive heart disease with systolic CHF, NYHA class 2 (HCC) (Chronic) ICD-10-CM: I11.0, I50.20  ICD-9-CM: 402.11, 428.20, 428.0  9/5/2012 Yes        Type 2 diabetes mellitus with diabetic neuropathy (HCC) (Chronic) ICD-10-CM: E11.40  ICD-9-CM: 250.60, 357.2  6/28/2011 Yes              Background:   Past Medical History:   Past Medical History:   Diagnosis Date    Benign hypertensive heart disease with systolic CHF, NYHA class 2 ([facility] [address] 9/5/2012    Biventricular implantable cardioverter-defibrillator in situ 04/28/2005    Upgraded to BiV AICD; gen change 4/2008; pocket revision 10/2009; Abdominal - done on 8/22/2012 by Dr. Reyna Weiss Cardiac cath 08/15/1996    Patent coronaries. Elev LVEDP. EF 50-55%.  Cardiac echocardiogram 06/23/2015    Ltd study. EF 45-50%. Mild, diffuse hypk. Severe apical hypk. No mass or thrombus was clearly identified, although imaging was suboptimal.      Cardiac nuclear imaging test 06/19/2015    Fixed distal apical, distal septal defect more likely due to RV pacing than prior infarct. No ischemia. EF 46%. RWMA c/w RV pacing. Nondiagnostic EKG on pharm stress test.      Cardiovascular lower extremity venous duplex 09/04/2012    Acute, non-occlusive DVT in CFV on right. No DVT on left. No superficial thrombosis bilaterally.  Cardiovascular upper extremity venous duplex 08/27/2012    DVT in axillary vein on left. Left subclavian was not visualized.     Chronic anemia 9/5/2012    Chronic systolic heart failure (HCC)     Decreased calculated glomerular filtration rate (GFR) 3/30/2017    Calculated GFR equivalent to that of CKD stage 3 = 30-59 ml/min    Diabetic neuropathy associated with type 2 diabetes mellitus ([address] 6/28/2011    Difficult airway for intubation 08/22/2012    see anesthesia airway note    Dyslipidemia 6/28/2011    Gout     History of complete heart block 6/28/2011    History of Coumadin therapy     Anticoagulation for DVT of the LUE; Discontinued on 3/30/2017    History of deep venous thrombosis 9/5/2012    Left upper extremity    History of pyelonephritis 3/30/2017    Left bundle branch block (LBBB) on electrocardiogram 6/28/2011    Nonischemic cardiomyopathy ([address] 6/28/2011    Obesity (BMI 35.0-39.9 without comorbidity) ([address] 3/13/2017    Obstructive sleep apnea on CPAP 2/7/2012    Psoas hematoma, right, secondary to anticoagulant therapy 3/30/2017    Type 2 diabetes mellitus with diabetic neuropathy ([address] 6/28/2011      Patient taking anticoagulants no    Patient has a defibrillator: yes Assessment:   Changes in Assessment throughout shift: no     Patient has central line: no Reasons if yes: Insertion date: Last dressing date:   Patient has Krueger Cath: no Reasons if yes: Insertion date:     Last Vitals:     Vitals:    04/14/17 2304 04/15/17 0800 04/15/17 1640 04/15/17 1713   BP: 139/77 144/70 144/70 144/70   Pulse: 68 71 75    Resp: 18 18 18    Temp: 97.8 °F (36.6 °C) 98 °F (36.7 °C) 96.8 °F (36 °C)    SpO2: 99% 97% 99%    Weight:       Height:            PAIN    Pain Assessment    Pain Intensity 1: 7 (04/15/17 1640) Pain Intensity 1: 2 (12/29/14 1105)    Pain Location 1: Abdomen, Leg Pain Location 1: Abdomen    Pain Intervention(s) 1: Medication (see MAR) Pain Intervention(s) 1: Medication (see MAR)  Patient Stated Pain Goal: 0 Patient Stated Pain Goal: 0  o Intervention effective: no    o Other actions taken for pain: pain medicine     Skin Assessment  Skin color Skin Color: Appropriate for ethnicity  Condition/Temperature Skin Condition/Temp: Dry, Warm  Integrity Skin Integrity: Intact  Turgor    Weekly Pressure Ulcer Documentation Weekly Pressure Ulcer Documentation: No Pressure Ulcer Noted-Pressure Ulcer Prevention Initiated  Wound Prevention & Protection Methods  Orientation of wound Orientation of Wound Prevention: Posterior  Location of Prevention Location of Wound Prevention: Sacrum/Coccyx  Dressing Present Dressing Present : No  Dressing Status    Wound Offloading Wound Offloading (Prevention Methods): Bed, pressure redistribution/air     INTAKE/OUPUT    Date 04/14/17 1900 - 04/15/17 0659 04/15/17 0700 - 04/16/17 0659   Shift 8195-6617 24 Hour Total 0116-9363 5461-3357 24 Hour Total   I  N  T  A  K  E   P. O.  540 220  220      P. O.  540 220  220    Shift Total  (mL/kg)  540  (5.1) 220  (2.1)  220  (2.1)   O  U  T  P  U  T   Urine  (mL/kg/hr)           Urine Occurrence(s) 1 x 2 x 6 x  6 x    Stool           Stool Occurrence(s) 0 x 0 x 2 x  2 x    Shift Total  (mL/kg) NET  540 220  220   Weight (kg) 105.7 105.7 105.7 105.7 105.7       Recommendations:  1. Patient needs and requests: pain medicine    2. Diet: diabetic    3. Pending tests/procedures: no     4. Functional Level/Equipment: 1 x person assist    5. Estimated Discharge Date: TDB Posted on Whiteboard in Patients Room: [facility] Safety Check    A safety check occurred in the patient's room between off going nurse and oncoming nurse listed above. The safety check included the below items  Area Items   H  High Alert Medications - Verify all high alert medication drips (heparin, PCA, etc.)   E  Equipment - Suction is set up for ALL patients (with vania)  - Red plugs utilized for all equipment (IV pumps, etc.)  - WOWs wiped down at end of shift.  - Room stocked with oxygen, suction, and other unit-specific supplies   A  Alarms - Bed alarm is set for fall risk patients  - Ensure chair alarm is in place and activated if patient is up in a chair   L  Lines - Check IV for any infiltration  - Krueger bag is empty if patient has a Krueger   - Tubing and IV bags are labeled   S  Safety   - Room is clean, patient is clean, and equipment is clean. - Hallways are clear from equipment besides carts. - Fall bracelet on for fall risk patients  - Ensure room is clear and free of clutter  - Suction is set up for ALL patients (with vania)  - Hallways are clear from equipment besides carts.    - Isolation precautions followed, supplies available outside room, sign posted

## 2017-04-16 LAB
GLUCOSE BLD STRIP.AUTO-MCNC: 108 MG/DL (ref 70–110)
GLUCOSE BLD STRIP.AUTO-MCNC: 126 MG/DL (ref 70–110)
GLUCOSE BLD STRIP.AUTO-MCNC: 127 MG/DL (ref 70–110)
GLUCOSE BLD STRIP.AUTO-MCNC: 147 MG/DL (ref 70–110)

## 2017-04-16 RX ADMIN — ALLOPURINOL 100 MG: 100 TABLET ORAL at 08:36

## 2017-04-16 RX ADMIN — LACTOBACILLUS TAB 2 TABLET: TAB at 08:35

## 2017-04-16 RX ADMIN — MODAFINIL 100 MG: 100 TABLET ORAL at 08:36

## 2017-04-16 RX ADMIN — MELATONIN TAB 3 MG 3 MG: 3 TAB at 21:23

## 2017-04-16 RX ADMIN — FOLIC ACID 1 MG: 1 TABLET ORAL at 08:36

## 2017-04-16 RX ADMIN — NALOXEGOL OXALATE 25 MG: 25 TABLET, FILM COATED ORAL at 06:35

## 2017-04-16 RX ADMIN — DOCUSATE SODIUM 100 MG: 100 CAPSULE, LIQUID FILLED ORAL at 08:36

## 2017-04-16 RX ADMIN — OXYCODONE HYDROCHLORIDE AND ACETAMINOPHEN 1 TABLET: 7.5; 325 TABLET ORAL at 08:35

## 2017-04-16 RX ADMIN — ACETAMINOPHEN 650 MG: 325 TABLET ORAL at 06:05

## 2017-04-16 RX ADMIN — OXYCODONE HYDROCHLORIDE AND ACETAMINOPHEN 1 TABLET: 7.5; 325 TABLET ORAL at 21:22

## 2017-04-16 RX ADMIN — INSULIN GLARGINE 15 UNITS: 100 INJECTION, SOLUTION SUBCUTANEOUS at 21:23

## 2017-04-16 RX ADMIN — GABAPENTIN 600 MG: 300 CAPSULE ORAL at 17:25

## 2017-04-16 RX ADMIN — LACTOBACILLUS TAB 2 TABLET: TAB at 17:25

## 2017-04-16 RX ADMIN — CARVEDILOL 6.25 MG: 3.12 TABLET, FILM COATED ORAL at 17:25

## 2017-04-16 RX ADMIN — OXYCODONE HYDROCHLORIDE AND ACETAMINOPHEN 1 TABLET: 7.5; 325 TABLET ORAL at 12:29

## 2017-04-16 RX ADMIN — GABAPENTIN 600 MG: 300 CAPSULE ORAL at 08:35

## 2017-04-16 RX ADMIN — OXYCODONE HYDROCHLORIDE AND ACETAMINOPHEN 1 TABLET: 7.5; 325 TABLET ORAL at 17:25

## 2017-04-16 RX ADMIN — SITAGLIPTIN 50 MG: 50 TABLET, FILM COATED ORAL at 08:35

## 2017-04-16 RX ADMIN — CARVEDILOL 6.25 MG: 3.12 TABLET, FILM COATED ORAL at 08:36

## 2017-04-16 NOTE — ROUTINE PROCESS
[facility]    Bedside and Verbal shift change report given to DANIEL Rios (oncoming nurse) by Natacha Arreola RN (offgoing nurse). Report included the following information SBAR, Kardex, MAR and Recent Results. Situation:   Code Status: Full Code   Reason for Admission: Psoas Hematoma  Hospital Day: 10   Problem List:   Hospital Problems  Date Reviewed: 4/15/2017          Codes Class Noted POA    History of Coumadin therapy ICD-10-CM: Z79.01  ICD-9-CM: V58.61  Unknown Yes    Overview Signed 4/6/2017 10:35 PM by Sharmaine Arguello MD     Anticoagulation for chronic atrial fibrillation; Discontinued on 3/30/2017             Decreased calculated glomerular filtration rate (GFR) ICD-10-CM: R94.4  ICD-9-CM: 794.4  3/30/2017 Yes    Overview Signed 4/6/2017  5:47 PM by Sharmaine Arguello MD     Calculated GFR equivalent to that of CKD stage 3 = 30-59 ml/min             History of pyelonephritis ICD-10-CM: Z87.440  ICD-9-CM: V13.02  3/30/2017 Yes        Iliopsoas muscle hematoma ICD-10-CM: S43.34VW  ICD-9-CM: 924.00  3/30/2017 Yes        * (Principal)Psoas hematoma, right, secondary to anticoagulant therapy ICD-10-CM: S30. 1XXA  ICD-9-CM: 924.9, E934.2  3/30/2017 Yes        Generalized weakness ICD-10-CM: R53.1  ICD-9-CM: 780.79  3/30/2017 Yes        Impaired mobility and ADLs ICD-10-CM: Z74.09  ICD-9-CM: 799.89  3/30/2017 Yes        Benign hypertensive heart disease with systolic CHF, NYHA class 2 (HCC) (Chronic) ICD-10-CM: I11.0, I50.20  ICD-9-CM: 402.11, 428.20, 428.0  9/5/2012 Yes        Type 2 diabetes mellitus with diabetic neuropathy (HCC) (Chronic) ICD-10-CM: E11.40  ICD-9-CM: 250.60, 357.2  6/28/2011 Yes              Background:   Past Medical History:   Past Medical History:   Diagnosis Date    Benign hypertensive heart disease with systolic CHF, NYHA class 2 ([address] 9/5/2012    Biventricular implantable cardioverter-defibrillator in situ 04/28/2005    Upgraded to BiV AICD; gen change 4/2008; pocket revision 10/2009; Abdominal - done on 8/22/2012 by Dr. Michael Mcclure Cardiac cath 08/15/1996    Patent coronaries. Elev LVEDP. EF 50-55%.  Cardiac echocardiogram 06/23/2015    Ltd study. EF 45-50%. Mild, diffuse hypk. Severe apical hypk. No mass or thrombus was clearly identified, although imaging was suboptimal.      Cardiac nuclear imaging test 06/19/2015    Fixed distal apical, distal septal defect more likely due to RV pacing than prior infarct. No ischemia. EF 46%. RWMA c/w RV pacing. Nondiagnostic EKG on pharm stress test.      Cardiovascular lower extremity venous duplex 09/04/2012    Acute, non-occlusive DVT in CFV on right. No DVT on left. No superficial thrombosis bilaterally.  Cardiovascular upper extremity venous duplex 08/27/2012    DVT in axillary vein on left. Left subclavian was not visualized.     Chronic anemia 9/5/2012    Chronic systolic heart failure (HCC)     Decreased calculated glomerular filtration rate (GFR) 3/30/2017    Calculated GFR equivalent to that of CKD stage 3 = 30-59 ml/min    Diabetic neuropathy associated with type 2 diabetes mellitus ([address] 6/28/2011    Difficult airway for intubation 08/22/2012    see anesthesia airway note    Dyslipidemia 6/28/2011    Gout     History of complete heart block 6/28/2011    History of Coumadin therapy     Anticoagulation for DVT of the LUE; Discontinued on 3/30/2017    History of deep venous thrombosis 9/5/2012    Left upper extremity    History of pyelonephritis 3/30/2017    Left bundle branch block (LBBB) on electrocardiogram 6/28/2011    Nonischemic cardiomyopathy ([address] 6/28/2011    Obesity (BMI 35.0-39.9 without comorbidity) ([address] 3/13/2017    Obstructive sleep apnea on CPAP 2/7/2012    Psoas hematoma, right, secondary to anticoagulant therapy 3/30/2017    Type 2 diabetes mellitus with diabetic neuropathy ([address] 6/28/2011      Patient taking anticoagulants no    Patient has a defibrillator: yes Assessment:   Changes in Assessment throughout shift: no     Patient has central line: no Reasons if yes: Insertion date: Last dressing date:   Patient has Krueger Cath: no Reasons if yes: Insertion date:     Last Vitals:     Vitals:    04/15/17 0800 04/15/17 1640 04/15/17 1713 04/15/17 1930   BP: 144/70 144/70 144/70 152/75   Pulse: 71 75  90   Resp: 18 18  19   Temp: 98 °F (36.7 °C) 96.8 °F (36 °C)  98.6 °F (37 °C)   SpO2: 97% 99%  96%   Weight:       Height:            PAIN    Pain Assessment    Pain Intensity 1: 5 (04/16/17 0608) Pain Intensity 1: 2 (12/29/14 1105)    Pain Location 1: Abdomen Pain Location 1: Abdomen    Pain Intervention(s) 1: Medication (see MAR) Pain Intervention(s) 1: Medication (see MAR)  Patient Stated Pain Goal: 0 Patient Stated Pain Goal: 0  o Intervention effective: no    o Other actions taken for pain: pain medicine     Skin Assessment  Skin color Skin Color: Appropriate for ethnicity  Condition/Temperature Skin Condition/Temp: Dry, Warm  Integrity Skin Integrity: Intact  Turgor    Weekly Pressure Ulcer Documentation Weekly Pressure Ulcer Documentation: No Pressure Ulcer Noted-Pressure Ulcer Prevention Initiated  Wound Prevention & Protection Methods  Orientation of wound Orientation of Wound Prevention: Posterior  Location of Prevention Location of Wound Prevention: Sacrum/Coccyx  Dressing Present Dressing Present : No  Dressing Status    Wound Offloading Wound Offloading (Prevention Methods): Bed, pressure redistribution/air     INTAKE/OUPUT    Date 04/15/17 0700 - 04/16/17 0659 04/16/17 0700 - 04/17/17 0659   Shift 7473-0291 8857-7253 24 Hour Total 0700-1859 1900-0659 24 Hour Total   I  N  T  A  K  E   P.O. 220  220         P. O. 220  220       Shift Total  (mL/kg) 220  (2.1)  220  (2.1)      O  U  T  P  U  T   Urine  (mL/kg/hr)            Urine Occurrence(s) 7 x 0 x 7 x       Stool            Stool Occurrence(s) 2 x 0 x 2 x       Shift Total  (mL/kg)          220      Weight (kg) 105.7 105.7 105.7 105.7 105.7 105.7       Recommendations:  1. Patient needs and requests: pain medicine    2. Diet: diabetic    3. Pending tests/procedures: no     4. Functional Level/Equipment: 1 x person assist    5. Estimated Discharge Date: TDB Posted on Whiteboard in Patients Room: [facility] Safety Check    A safety check occurred in the patient's room between off going nurse and oncoming nurse listed above. The safety check included the below items  Area Items   H  High Alert Medications - Verify all high alert medication drips (heparin, PCA, etc.)   E  Equipment - Suction is set up for ALL patients (with vania)  - Red plugs utilized for all equipment (IV pumps, etc.)  - WOWs wiped down at end of shift.  - Room stocked with oxygen, suction, and other unit-specific supplies   A  Alarms - Bed alarm is set for fall risk patients  - Ensure chair alarm is in place and activated if patient is up in a chair   L  Lines - Check IV for any infiltration  - Krueger bag is empty if patient has a Krueger   - Tubing and IV bags are labeled   S  Safety   - Room is clean, patient is clean, and equipment is clean. - Hallways are clear from equipment besides carts. - Fall bracelet on for fall risk patients  - Ensure room is clear and free of clutter  - Suction is set up for ALL patients (with vania)  - Hallways are clear from equipment besides carts.    - Isolation precautions followed, supplies available outside room, sign posted

## 2017-04-16 NOTE — PROGRESS NOTES
Assumed care; Pt awake in recliner; no distress noted; Pt denies pain; bed in low position; Side rails up x 2; Call light and personal belonging within reach. Will continue to monitor.

## 2017-04-16 NOTE — PROGRESS NOTES
[facility] PHYSICAL REHABILITATION  [address] [place] [address], [address]     INPATIENT REHABILITATION  DAILY PROGRESS NOTE     Date: 4/16/2017    Name: Gennaro Cheng Age / Sex: 77 y.o. / female   CSN: [id number] MRN: [id number]   [facility] Date: 4/6/2017 Length of Stay: 10 days     Primary Rehab Diagnosis: Impaired Mobility and ADLs secondary to Right iliopsoas muscle hematoma, secondary to supratherapeutic INR/anticoagulant therapy      Subjective:     No new issues or problems reported. Blood pressure controlled. Blood sugars controlled.        Objective:     Vital Signs:  Patient Vitals for the past 24 hrs:   BP Temp Pulse Resp SpO2   04/16/17 0744 139/72 98.3 °F (36.8 °C) 74 19 97 %   04/15/17 1930 152/75 98.6 °F (37 °C) 90 19 96 %   04/15/17 1713 144/70 - - - -   04/15/17 1640 144/70 96.8 °F (36 °C) 75 18 99 %        Current Medications:  Current Facility-Administered Medications   Medication Dose Route Frequency    insulin glargine (LANTUS) injection 15 Units  15 Units SubCUTAneous QHS    gabapentin (NEURONTIN) capsule 600 mg  600 mg Oral BID    modafinil (PROVIGIL) tablet 100 mg  100 mg Oral DAILY    melatonin tablet 3 mg  3 mg Oral QHS    SITagliptin (JANUVIA) tablet 50 mg  50 mg Oral DAILY    naloxegol (MOVANTIK) tablet 25 mg  25 mg Oral ACB    carvedilol (COREG) tablet 6.25 mg  6.25 mg Oral BID WITH MEALS    oxyCODONE-acetaminophen (PERCOCET 7.5) 7.5-325 mg per tablet 1 Tab  1 Tab Oral TID    oxyCODONE-acetaminophen (PERCOCET 7.5) 7.5-325 mg per tablet 1 Tab  1 Tab Oral Q4H PRN    ondansetron hcl (ZOFRAN) tablet 4 mg  4 mg Oral TID PRN    allopurinol (ZYLOPRIM) tablet 100 mg  100 mg Oral DAILY    docusate sodium (COLACE) capsule 100 mg  100 mg Oral DAILY AFTER BREAKFAST    senna-docusate (PERICOLACE) 8.6-50 mg per tablet 2 Tab  2 Tab Oral PCD    acetaminophen (TYLENOL) tablet 650 mg  650 mg Oral Q4H PRN    bisacodyl (DULCOLAX) tablet 10 mg  10 mg Oral Q48H PRN    insulin lispro (HUMALOG) injection   SubCUTAneous TIDAC    folic acid (FOLVITE) tablet 1 mg  1 mg Oral DAILY    glucose chewable tablet 16 g  4 Tab Oral PRN    glucagon (GLUCAGEN) injection 1 mg  1 mg IntraMUSCular PRN    dextrose (D50W) injection syrg 12.5-25 g  25-50 mL IntraVENous PRN    Lactobacillus Acidoph & Bulgar (FLORANEX) tablet 2 Tab  2 Tab Oral BID       Allergies: Allergies   Allergen Reactions    Vancomycin Itching    Ampicillin Itching    Bactrim [Sulfamethoxazole-Trimethoprim] Unknown (comments)    Blueberry Swelling     Causes throat swelling    Ciprofloxacin Itching    Codeine Other (comments)     Jumpy feeling    Crestor [Rosuvastatin] Itching    Darvocet A500 [Propoxyphene N-Acetaminophen] Itching    Demerol [Meperidine] Itching    Levaquin [Levofloxacin] Itching    Lipitor [Atorvastatin] Myalgia    Magnesium Oxide Itching     nausea    Minocin [Minocycline] Unknown (comments)    Pcn [Penicillins] Itching    Pravachol [Pravastatin] Swelling     Swelling in mouth     Sulfa (Sulfonamide Antibiotics) Itching    Ultracet [Tramadol-Acetaminophen] Itching    Vicodin [Hydrocodone-Acetaminophen] Unknown (comments)    Vytorin 10-10 [Ezetimibe-Simvastatin] Myalgia    Percodan [Oxycodone Hcl-Oxycodone-Asa] Itching       Lab/Data Review:  Recent Results (from the past 24 hour(s))   GLUCOSE, POC    Collection Time: 04/15/17  5:08 PM   Result Value Ref Range    Glucose (POC) 152 (H) 70 - 110 mg/dL   GLUCOSE, POC    Collection Time: 04/15/17  9:01 PM   Result Value Ref Range    Glucose (POC) 133 (H) 70 - 110 mg/dL   GLUCOSE, POC    Collection Time: 04/16/17  8:08 AM   Result Value Ref Range    Glucose (POC) 108 70 - 110 mg/dL   GLUCOSE, POC    Collection Time: 04/16/17 12:01 PM   Result Value Ref Range    Glucose (POC) 147 (H) 70 - 110 mg/dL       Estimated Glomerular Filtration Rate:  CKD-EPI:   On admission, estimated GFR was 53.5 mL/min/1.73m2 based on a Creatinine of 1.22 mg/dl.    Most recent estimated GFR was 60.6 mL/min/1.73m2 based on a Creatinine of 1.10 mg/dl. MDRD:   On admission, estimated GFR was 56.7 mL/min/1.73m2 based on a Creatinine of 1.22 mg/dl. Most recent estimated GFR was 63.9 mL/min/1.73m based on a Creatinine of 1.10 mg/dl. 2. Assessment:     Primary Rehabilitation Diagnosis  1. Impaired Mobility and ADLs  2. Right iliopsoas muscle hematoma, secondary to supratherapeutic INR/anticoagulant therapy      Comorbidities   Nonischemic cardiomyopathy    History of complete heart block    Biventricular implantable cardioverter-defibrillator in situ    Left bundle branch block (LBBB) on electrocardiogram    Type 2 diabetes mellitus with diabetic neuropathy    Dyslipidemia    Diabetic neuropathy associated with type 2 diabetes mellitus    Obstructive sleep apnea on CPAP    History of AICD generator infection     Difficult airway for intubation    Benign hypertensive heart disease with systolic CHF, NYHA class 2     Decreased calculated glomerular filtration rate (GFR)    Chronic anemia    History of deep venous thrombosis    Anticoagulated on Coumadin    Pacemaker twiddler's syndrome    Chronic systolic heart failure     Obesity (BMI 35.0-39.9 without comorbidity)     History of pyelonephritis    History of Coumadin therapy    Gout       Plan:     1. Justification for continued stay: Good progression towards established rehabilitation goals. 2. Medical Issues being followed closely:    [x]  Fall and safety precautions     []  Wound Care     [x]  Bowel and Bladder Function     [x]  Fluid Electrolyte and Nutrition Balance     [x]  Pain Control      3.  Issues that 24 hour rehabilitation nursing is following:    [x]  Fall and safety precautions     []  Wound Care     [x]  Bowel and Bladder Function     [x]  Fluid Electrolyte and Nutrition Balance     [x]  Pain Control      [x]  Assistance with and education on in-room safety with transfers to and from the bed, wheelchair, toilet and shower. 4. Acute rehabilitation plan of care:    [x]  Continue current care and rehab. [x]  Physical Therapy           [x]  Occupational Therapy           []  Speech Therapy     []  Hold Rehab until further notice     5. Medications:    [x]  MAR Reviewed     [x]  Continue Present Medications     6. DVT Prophylaxis:      []  Lovenox     []  Unfractionated Heparin     []  Coumadin     []  Xarelto     [x]  MIMI Stockings     [x]  Sequential Compression Device     []  None     7. Orders:   > Right iliopsoas muscle hematoma, secondary to supratherapeutic INR/anticoagulant therapy   > CT scan of the abdomen and pelvis (3/30/2017) showed an asymmetrically enlarged right iliopsoas muscle compared to the left extending to the right groin region with evidence of intramuscular hematoma in different stage; this is new compared to the recent CT 3/16/17; mild fatty stranding noted in the right pericolonic gutter and the right groin; no additional hematoma or other significant injury seen. > CT scan of the abdomen and pelvis (4/02/2017) showed redemonstration of prominent hematoma within the right psoas muscle, essentially unchanged as compared to previous study on 3/13/2017; around right psoas muscle there are are some hazy densities, indicating mild reactive or congestive changes similar to previous study; otherwise, there is no definable confluent inflammatory phlegmon around right psoas muscle; there is no evidence of hematoma in left psoas muscle or in rest of the retroperitoneum.    > Coumadin had been discontinued on 3/30/2017   > INR (4/6/2017) = 1.3   > Hgb/Hct (4/7/2017) = 8.8/26.5    > Hgb/Hct (4/10/2017) = 9.0/27.2    > Hgb/Hct (4/13/2017) = 8.7/26.8      > Benign hypertensive heart disease with chronic systolic heart failure   > On 4/10/2017, decreased Carvedilol from 12.5 mg to 6.25 mg PO BID with meals (8AM, 5PM)   > Continue Carvedilol 6.25 mg PO BID with meals (8AM, 5PM)     > History of pyelonephritis   > Patient had completed a 5-day treatment course of Aztreonam during his stay at [facility]    > On 4/7/2017, patient was started on Floranex 2 tabs PO BID    > Continue Floranex 2 tabs PO BID     > Type 2 diabetes mellitus with diabetic neuropathy   > On 4/8/2017, decreased Lantus from 24 units SC BID to 25 units SC q HS   > On 4/12/2017, decreased Lantus from 25 units to 20 units SC q HS   > On 4/13/2017, resumed Sitagliptin 50 mg PO once daily   > On 4/15/2017, decreased Lantus from 20 units to 15 units SC q HS   > Continue:    > Sitagliptin 50 mg PO once daily    > Lantus 15 units SC q HS    > Humalog insulin sliding scale SC TID AC only     > Gout / Hyperuricemia    > Prior to admission to [facility], the patient was on Allopurinol 100 mg PO BID   > Uric acid (4/7/2017) = 3.8   > On 4/7/2017, decreased Allopurinol from 100 mg PO BID to 100 mg PO once daily   > Continue Allopurinol 100 mg PO once daily     > Constipation   > On 4/10/2017, discontinued Polyethylene glycol 17 grams in 8 oz water PO once daily q 7PM    > On 4/13/2017, added Naloxegol 25 mg PO once daily before breakfast   > Continue:    > Docusate sodium 100 mg PO once daily after breakfast    > Pericolace 2 tabs PO once daily after dinner    > Naloxegol 25 mg PO once daily before breakfast    > Neurocognitive modulation    > On 4/14/2017, patient was started on:    > Modafinil 100 mg PO q AM    > Melatonin 3 mg PO q HS   > Continue:    > Modafinil 100 mg PO q AM    > Melatonin 3 mg PO q HS     > Analgesia   > On 4/10/2017:    > Increased Gabapentin from 300 mg to 400 mg PO BID    > Increased Percocet 5/325 to Percocet 7.5/325 1 tab PO TID (8AM, 12PM, 4PM)    > Increased Percocet 5/325 to Percocet 7.5/325 1 tab PO q 4 hr PRN for pain level greater than 4/10 (from 8PM to 4AM only)    > On 4/11/2017, added Cyclobenzaprine 5 mg PO q 8 hr   > On 4/14/2017:    > Discontinued Cyclobenzaprine 5 mg PO q 8 hr    > Increased Gabapentin from 400 mg to 600 mg PO BID   > Continue:    > Acetaminophen 650 mg PO q 4 hr PRN for pain level less than 5/10    > Gabapentin 600 mg PO BID    > Percocet 7.5/325 1 tab PO TID (8AM, 12PM, 4PM)    > Percocet 7.5/325 1 tab PO q 4 hr PRN for pain level greater than 4/10 (from 8PM to 4AM only)    > Warm compress to right thigh/hip PRN      Signed:    Conchis Ty MD    April 16, 2017

## 2017-04-17 LAB
ANION GAP BLD CALC-SCNC: 9 MMOL/L (ref 3–18)
BUN SERPL-MCNC: 10 MG/DL (ref 7–18)
BUN/CREAT SERPL: 10 (ref 12–20)
CALCIUM SERPL-MCNC: 8.9 MG/DL (ref 8.5–10.1)
CHLORIDE SERPL-SCNC: 97 MMOL/L (ref 100–108)
CO2 SERPL-SCNC: 29 MMOL/L (ref 21–32)
CREAT SERPL-MCNC: 1.01 MG/DL (ref 0.6–1.3)
GLUCOSE BLD STRIP.AUTO-MCNC: 109 MG/DL (ref 70–110)
GLUCOSE BLD STRIP.AUTO-MCNC: 109 MG/DL (ref 70–110)
GLUCOSE BLD STRIP.AUTO-MCNC: 121 MG/DL (ref 70–110)
GLUCOSE BLD STRIP.AUTO-MCNC: 163 MG/DL (ref 70–110)
GLUCOSE SERPL-MCNC: 102 MG/DL (ref 74–99)
HCT VFR BLD AUTO: 28.3 % (ref 35–45)
HGB BLD-MCNC: 9.3 G/DL (ref 12–16)
POTASSIUM SERPL-SCNC: 4.3 MMOL/L (ref 3.5–5.5)
SODIUM SERPL-SCNC: 135 MMOL/L (ref 136–145)

## 2017-04-17 RX ADMIN — MELATONIN TAB 3 MG 3 MG: 3 TAB at 21:17

## 2017-04-17 RX ADMIN — GABAPENTIN 600 MG: 300 CAPSULE ORAL at 08:26

## 2017-04-17 RX ADMIN — SITAGLIPTIN 50 MG: 50 TABLET, FILM COATED ORAL at 08:26

## 2017-04-17 RX ADMIN — DOCUSATE SODIUM 100 MG: 100 CAPSULE, LIQUID FILLED ORAL at 08:26

## 2017-04-17 RX ADMIN — INSULIN GLARGINE 15 UNITS: 100 INJECTION, SOLUTION SUBCUTANEOUS at 21:13

## 2017-04-17 RX ADMIN — ALLOPURINOL 100 MG: 100 TABLET ORAL at 08:26

## 2017-04-17 RX ADMIN — OXYCODONE HYDROCHLORIDE AND ACETAMINOPHEN 1 TABLET: 7.5; 325 TABLET ORAL at 12:40

## 2017-04-17 RX ADMIN — FOLIC ACID 1 MG: 1 TABLET ORAL at 08:26

## 2017-04-17 RX ADMIN — OXYCODONE HYDROCHLORIDE AND ACETAMINOPHEN 1 TABLET: 7.5; 325 TABLET ORAL at 17:08

## 2017-04-17 RX ADMIN — STANDARDIZED SENNA CONCENTRATE AND DOCUSATE SODIUM 2 TABLET: 8.6; 5 TABLET, FILM COATED ORAL at 17:08

## 2017-04-17 RX ADMIN — MODAFINIL 100 MG: 100 TABLET ORAL at 08:25

## 2017-04-17 RX ADMIN — NALOXEGOL OXALATE 25 MG: 25 TABLET, FILM COATED ORAL at 06:39

## 2017-04-17 RX ADMIN — OXYCODONE HYDROCHLORIDE AND ACETAMINOPHEN 1 TABLET: 7.5; 325 TABLET ORAL at 20:39

## 2017-04-17 RX ADMIN — CARVEDILOL 6.25 MG: 3.12 TABLET, FILM COATED ORAL at 08:25

## 2017-04-17 RX ADMIN — OXYCODONE HYDROCHLORIDE AND ACETAMINOPHEN 1 TABLET: 7.5; 325 TABLET ORAL at 08:26

## 2017-04-17 RX ADMIN — GABAPENTIN 600 MG: 300 CAPSULE ORAL at 17:09

## 2017-04-17 RX ADMIN — CARVEDILOL 6.25 MG: 3.12 TABLET, FILM COATED ORAL at 17:08

## 2017-04-17 NOTE — PROGRESS NOTES
Problem: Mobility Impaired (Adult and Pediatric)  Goal: *Acute Goals and Plan of Care (Insert Text)        Physical Therapy Long Term Goals  Initiated 2017 and to be accomplished by 2017  1. Patient will move from supine to sit and sit to supine , scoot up and down and roll side to side in bed with modified independence. 2. Patient will transfer from bed to chair and chair to bed with SPV using the least restrictive device. 3. Patient will perform sit to stand with distant SPV. 4. Patient will ambulate with supervision/set-up for 100 feet with the least restrictive device. 5. Patient will ascend/descend 3 stairs with 1 handrail(s) with minimal assistance/contact guard assist.   Outcome: Progressing Towards Goal  PHYSICAL THERAPY DAILY NOTE  Patient Nicky Gonzalez   Time in:15:00  Time out: 4502  Patient seen For: Family training that addressed transfers, posture,strength and safety awareness. Time In: 0930  Time Out: 1030  Patient Seen For: AM;Balance activities;Gait training;Patient education; Therapeutic exercise;Transfer training  Diagnosis: Debility  Psoas hematoma, right, secondary to anticoagulant therapy Psoas hematoma, right, secondary to anticoagulant therapy  Precautions: falls, decreased WBing of RLE with ambulation and poor heel strike with advancement of RLE. Subjective: I am in so much pain, it is difficult for me to stand upright     Pain at start of tx: 8/10 (Abdomen radiating down the R thigh) Pt with increased edema of BLEs, R>L, recommend pt elevate LEs above heart to address and she reports not being able to lie flat. Pt is donning compression stockings during PM session. Pain at stop of tx: 8/10     Patient identified with name and : yes         Objective: Pt is seen for skilled PT session to address current deficits with weakness, decreased ambulation, functional mobility,  safety and balance.  Pt with forward flexed posture during ambulation, unknown as to what is causing this except for the pts c/o pain in her abdomen that radiates down R thigh. Pt was educated at length regarding safety concerns with forward flexed posture and with decreased heel strike of R ankle. GROSS ASSESSMENT Daily Assessment     AROM: Generally decreased, functional  PROM: Generally decreased, functional  Strength: Generally decreased, functional  Coordination: Generally decreased, functional  Tone: Normal  Sensation: Intact             TRANSFERS Daily Assessment     Transfer Type: Other  Other:  (stand step to RW)  Transfer Assistance : 4 (Contact guard assistance)  Sit to Stand Assistance: Stand-by assistance  Car Transfers: Not tested  Car Type:  (NA) Pt was challenged with sit to stand transfers using good forward hip placement, hand placement and walker placement. Pt was instructed in loading LEs to decreased stress of UEs during sit to stand transfers. Pt is cued to stand erect upon standing transfers. GAIT Daily Assessment     Amount of Assistance: 4 (Contact guard assistance)  Distance (ft):  (25 ft x 2 )  Assistive Device: Gait belt;Walker, rolling Pt with poor static posture during transfers, attempted NDT of LB extensors and activation of abdominals with little to no improvement noted. Pt complained of increasing pain. BALANCE Daily Assessment     Sitting - Static: Good (unsupported)  Sitting - Dynamic: Good (unsupported)  Standing - Static: Fair  Standing - Dynamic : Impaired Pt was encouraged to perform seated LB extension to facilitate extension with standing as a starting point.           WHEELCHAIR MOBILITY Daily Assessment     Able to Propel (ft):  (250 ft)  Functional Level:  (6)  Curbs/Ramps Assist Required (FIM Score): 0 (Not tested)  Wheelchair Setup Assist Required : 2 (Maximal assistance)  Wheelchair Management: Manages left brake;Manages right brake (with extendor)          LOWER EXTREMITY EXERCISES Daily Assessment     Extremity: Both (seated: TR/HR,LAQs, hip flexion with increased time)  Exercise Type #1: Seated lower extremity strengthening  Sets Performed: 1  Reps Performed: 20  Level of Assist: Supervision              Assessment: Pt with slow progress 2/2 what she reports is moderate pain in her abdomen that radiates down her R thigh upon WBing. She reports being medicated for this pain at 8:30am and the pain medication does not help much. Continue to address pts current deficits with strength, transfers, ambulation, posture and safety. FT was conducted with her  and her daughter present. They address all of their immediate concerns with the therapist that were regarding medication regiment to which the therapist deferred to nursing. Also, pt and family is educated on transfers of patient with safe use of the RW and for cuing to improve her forward flexed posture to ensure improved safety to which they all agreed. Plan of Care: STGs have been updated on 4/13/17, so they have been removed from the POC, Continue to address pts current weakness, balance, ambulation and postural deficits.      Joseph Montanez  4/17/2017

## 2017-04-17 NOTE — ROUTINE PROCESS
[facility]    Bedside and Verbal shift change report given to Emmanuel Johansen RN (oncoming nurse) by Hanna Georges RN (offgoing nurse). Report included the following information SBAR, Kardex, MAR and Recent Results. Situation:   Code Status: Full Code   Reason for Admission: Psoas Hematoma  Hospital Day: 11   Problem List:   Hospital Problems  Date Reviewed: 4/16/2017          Codes Class Noted POA    History of Coumadin therapy ICD-10-CM: Z79.01  ICD-9-CM: V58.61  Unknown Yes    Overview Signed 4/6/2017 10:35 PM by Louise Castorena MD     Anticoagulation for chronic atrial fibrillation; Discontinued on 3/30/2017             Decreased calculated glomerular filtration rate (GFR) ICD-10-CM: R94.4  ICD-9-CM: 794.4  3/30/2017 Yes    Overview Signed 4/6/2017  5:47 PM by Louise Castorena MD     Calculated GFR equivalent to that of CKD stage 3 = 30-59 ml/min             History of pyelonephritis ICD-10-CM: Z87.440  ICD-9-CM: V13.02  3/30/2017 Yes        Iliopsoas muscle hematoma ICD-10-CM: M08.64NG  ICD-9-CM: 924.00  3/30/2017 Yes        * (Principal)Psoas hematoma, right, secondary to anticoagulant therapy ICD-10-CM: S30. 1XXA  ICD-9-CM: 924.9, E934.2  3/30/2017 Yes        Generalized weakness ICD-10-CM: R53.1  ICD-9-CM: 780.79  3/30/2017 Yes        Impaired mobility and ADLs ICD-10-CM: Z74.09  ICD-9-CM: 799.89  3/30/2017 Yes        Benign hypertensive heart disease with systolic CHF, NYHA class 2 (HCC) (Chronic) ICD-10-CM: I11.0, I50.20  ICD-9-CM: 402.11, 428.20, 428.0  9/5/2012 Yes        Type 2 diabetes mellitus with diabetic neuropathy (HCC) (Chronic) ICD-10-CM: E11.40  ICD-9-CM: 250.60, 357.2  6/28/2011 Yes              Background:   Past Medical History:   Past Medical History:   Diagnosis Date    Benign hypertensive heart disease with systolic CHF, NYHA class 2 ([address] 9/5/2012    Biventricular implantable cardioverter-defibrillator in situ 04/28/2005    Upgraded to BiV AICD; gen change 4/2008; pocket revision 10/2009; Abdominal - done on 8/22/2012 by Dr. Desmond Aguilar Cardiac cath 08/15/1996    Patent coronaries. Elev LVEDP. EF 50-55%.  Cardiac echocardiogram 06/23/2015    Ltd study. EF 45-50%. Mild, diffuse hypk. Severe apical hypk. No mass or thrombus was clearly identified, although imaging was suboptimal.      Cardiac nuclear imaging test 06/19/2015    Fixed distal apical, distal septal defect more likely due to RV pacing than prior infarct. No ischemia. EF 46%. RWMA c/w RV pacing. Nondiagnostic EKG on pharm stress test.      Cardiovascular lower extremity venous duplex 09/04/2012    Acute, non-occlusive DVT in CFV on right. No DVT on left. No superficial thrombosis bilaterally.  Cardiovascular upper extremity venous duplex 08/27/2012    DVT in axillary vein on left. Left subclavian was not visualized.     Chronic anemia 9/5/2012    Chronic systolic heart failure (HCC)     Decreased calculated glomerular filtration rate (GFR) 3/30/2017    Calculated GFR equivalent to that of CKD stage 3 = 30-59 ml/min    Diabetic neuropathy associated with type 2 diabetes mellitus ([address] 6/28/2011    Difficult airway for intubation 08/22/2012    see anesthesia airway note    Dyslipidemia 6/28/2011    Gout     History of complete heart block 6/28/2011    History of Coumadin therapy     Anticoagulation for DVT of the LUE; Discontinued on 3/30/2017    History of deep venous thrombosis 9/5/2012    Left upper extremity    History of pyelonephritis 3/30/2017    Left bundle branch block (LBBB) on electrocardiogram 6/28/2011    Nonischemic cardiomyopathy ([address] 6/28/2011    Obesity (BMI 35.0-39.9 without comorbidity) ([address] 3/13/2017    Obstructive sleep apnea on CPAP 2/7/2012    Psoas hematoma, right, secondary to anticoagulant therapy 3/30/2017    Type 2 diabetes mellitus with diabetic neuropathy ([address] 6/28/2011      Patient taking anticoagulants no    Patient has a defibrillator: yes Assessment:   Changes in Assessment throughout shift: no     Patient has central line: no Reasons if yes: Insertion date: Last dressing date:   Patient has Krueger Cath: no Reasons if yes: Insertion date:     Last Vitals:     Vitals:    04/15/17 1930 04/16/17 0744 04/16/17 1605 04/16/17 1930   BP: 152/75 139/72 139/71 138/68   Pulse: 90 74 76 82   Resp: 19 19 20 19   Temp: 98.6 °F (37 °C) 98.3 °F (36.8 °C) 99.8 °F (37.7 °C) 100.2 °F (37.9 °C)   SpO2: 96% 97% 97% 99%   Weight:       Height:            PAIN    Pain Assessment    Pain Intensity 1: 0 (04/17/17 0400) Pain Intensity 1: 2 (12/29/14 1105)    Pain Location 1: Back Pain Location 1: Abdomen    Pain Intervention(s) 1: Medication (see MAR) Pain Intervention(s) 1: Medication (see MAR)  Patient Stated Pain Goal: 0 Patient Stated Pain Goal: 0  o Intervention effective: no    o Other actions taken for pain: pain medicine     Skin Assessment  Skin color Skin Color: Appropriate for ethnicity  Condition/Temperature Skin Condition/Temp: Warm  Integrity Skin Integrity: Intact  Turgor    Weekly Pressure Ulcer Documentation Weekly Pressure Ulcer Documentation: No Pressure Ulcer Noted-Pressure Ulcer Prevention Initiated  Wound Prevention & Protection Methods  Orientation of wound Orientation of Wound Prevention: Posterior  Location of Prevention Location of Wound Prevention: Sacrum/Coccyx  Dressing Present Dressing Present : No  Dressing Status    Wound Offloading Wound Offloading (Prevention Methods): Bed, pressure redistribution/air     INTAKE/OUPUT    Date 04/16/17 0700 - 04/17/17 0659 04/17/17 0700 - 04/18/17 0659   Shift 7390-4711 4853-3600 24 Hour Total 3053-1463 7316-5300 24 Hour Total   I  N  T  A  K  E   P.O. 487  487         P. O. 487  487       Shift Total  (mL/kg) 487  (4.6)  487  (4.6)      O  U  T  P  U  T   Urine  (mL/kg/hr)            Urine Occurrence(s) 5 x 0 x 5 x       Stool            Stool Occurrence(s) 0 x 0 x 0 x       Shift Total  (mL/kg)           487      Weight (kg) 105.7 105.7 105.7 105.7 105.7 105.7       Recommendations:  1. Patient needs and requests: pain medicine    2. Diet: diabetic    3. Pending tests/procedures: no     4. Functional Level/Equipment: 1 x person assist    5. Estimated Discharge Date: TDB Posted on Whiteboard in Patients Room: [facility] Safety Check    A safety check occurred in the patient's room between off going nurse and oncoming nurse listed above. The safety check included the below items  Area Items   H  High Alert Medications - Verify all high alert medication drips (heparin, PCA, etc.)   E  Equipment - Suction is set up for ALL patients (with vania)  - Red plugs utilized for all equipment (IV pumps, etc.)  - WOWs wiped down at end of shift.  - Room stocked with oxygen, suction, and other unit-specific supplies   A  Alarms - Bed alarm is set for fall risk patients  - Ensure chair alarm is in place and activated if patient is up in a chair   L  Lines - Check IV for any infiltration  - Krueger bag is empty if patient has a Krueger   - Tubing and IV bags are labeled   S  Safety   - Room is clean, patient is clean, and equipment is clean. - Hallways are clear from equipment besides carts. - Fall bracelet on for fall risk patients  - Ensure room is clear and free of clutter  - Suction is set up for ALL patients (with vania)  - Hallways are clear from equipment besides carts.    - Isolation precautions followed, supplies available outside room, sign posted

## 2017-04-17 NOTE — PROGRESS NOTES
[facility] PHYSICAL REHABILITATION  [address] [place] [address], [address]     INPATIENT REHABILITATION  DAILY PROGRESS NOTE     Date: 4/17/2017    Name: Jerod Luo Age / Sex: 77 y.o. / female   CSN: [id number] MRN: [id number]   [facility] Date: 4/6/2017 Length of Stay: 11 days     Primary Rehab Diagnosis: Impaired Mobility and ADLs secondary to Right iliopsoas muscle hematoma, secondary to supratherapeutic INR/anticoagulant therapy      Subjective:     Patient sitting in chair in NAD, awake, alert, c/o leg swelling.      Objective:     Vital Signs:  Patient Vitals for the past 24 hrs:   BP Temp Pulse Resp SpO2   04/17/17 1637 159/78 99.1 °F (37.3 °C) 82 18 91 %   04/17/17 0944 153/79 99.2 °F (37.3 °C) 88 19 97 %      EXAM  General:  Awake, alert  Cardiovascular:  S1S2+, RRR  Pulmonary:  CTA b/l  GI:  Soft, BS+, NT, ND  Extremities:  + edema    Current Medications:  Current Facility-Administered Medications   Medication Dose Route Frequency    insulin glargine (LANTUS) injection 15 Units  15 Units SubCUTAneous QHS    gabapentin (NEURONTIN) capsule 600 mg  600 mg Oral BID    modafinil (PROVIGIL) tablet 100 mg  100 mg Oral DAILY    melatonin tablet 3 mg  3 mg Oral QHS    SITagliptin (JANUVIA) tablet 50 mg  50 mg Oral DAILY    naloxegol (MOVANTIK) tablet 25 mg  25 mg Oral ACB    carvedilol (COREG) tablet 6.25 mg  6.25 mg Oral BID WITH MEALS    oxyCODONE-acetaminophen (PERCOCET 7.5) 7.5-325 mg per tablet 1 Tab  1 Tab Oral TID    oxyCODONE-acetaminophen (PERCOCET 7.5) 7.5-325 mg per tablet 1 Tab  1 Tab Oral Q4H PRN    ondansetron hcl (ZOFRAN) tablet 4 mg  4 mg Oral TID PRN    allopurinol (ZYLOPRIM) tablet 100 mg  100 mg Oral DAILY    docusate sodium (COLACE) capsule 100 mg  100 mg Oral DAILY AFTER BREAKFAST    senna-docusate (PERICOLACE) 8.6-50 mg per tablet 2 Tab  2 Tab Oral PCD    acetaminophen (TYLENOL) tablet 650 mg  650 mg Oral Q4H PRN    bisacodyl (DULCOLAX) tablet 10 mg  10 mg Oral Q48H PRN    insulin lispro (HUMALOG) injection   SubCUTAneous TIDAC    folic acid (FOLVITE) tablet 1 mg  1 mg Oral DAILY    glucose chewable tablet 16 g  4 Tab Oral PRN    glucagon (GLUCAGEN) injection 1 mg  1 mg IntraMUSCular PRN    dextrose (D50W) injection syrg 12.5-25 g  25-50 mL IntraVENous PRN    Lactobacillus Acidoph & Bulgar (FLORANEX) tablet 2 Tab  2 Tab Oral BID       Allergies:   Allergies   Allergen Reactions    Vancomycin Itching    Ampicillin Itching    Bactrim [Sulfamethoxazole-Trimethoprim] Unknown (comments)    Blueberry Swelling     Causes throat swelling    Ciprofloxacin Itching    Codeine Other (comments)     Jumpy feeling    Crestor [Rosuvastatin] Itching    Darvocet A500 [Propoxyphene N-Acetaminophen] Itching    Demerol [Meperidine] Itching    Levaquin [Levofloxacin] Itching    Lipitor [Atorvastatin] Myalgia    Magnesium Oxide Itching     nausea    Minocin [Minocycline] Unknown (comments)    Pcn [Penicillins] Itching    Pravachol [Pravastatin] Swelling     Swelling in mouth     Sulfa (Sulfonamide Antibiotics) Itching    Ultracet [Tramadol-Acetaminophen] Itching    Vicodin [Hydrocodone-Acetaminophen] Unknown (comments)    Vytorin 10-10 [Ezetimibe-Simvastatin] Myalgia    Percodan [Oxycodone Hcl-Oxycodone-Asa] Itching       Lab/Data Review:  Recent Results (from the past 24 hour(s))   GLUCOSE, POC    Collection Time: 04/16/17  9:29 PM   Result Value Ref Range    Glucose (POC) 127 (H) 70 - 076 mg/dL   METABOLIC PANEL, BASIC    Collection Time: 04/17/17  6:24 AM   Result Value Ref Range    Sodium 135 (L) 136 - 145 mmol/L    Potassium 4.3 3.5 - 5.5 mmol/L    Chloride 97 (L) 100 - 108 mmol/L    CO2 29 21 - 32 mmol/L    Anion gap 9 3.0 - 18 mmol/L    Glucose 102 (H) 74 - 99 mg/dL    BUN 10 7.0 - 18 MG/DL    Creatinine 1.01 0.6 - 1.3 MG/DL    BUN/Creatinine ratio 10 (L) 12 - 20      GFR est AA >60 >60 ml/min/1.73m2    GFR est non-AA 55 (L) >60 ml/min/1.73m2    Calcium 8.9 8.5 - 10.1 MG/DL   HGB & HCT    Collection Time: 04/17/17  6:24 AM   Result Value Ref Range    HGB 9.3 (L) 12.0 - 16.0 g/dL    HCT 28.3 (L) 35.0 - 45.0 %   GLUCOSE, POC    Collection Time: 04/17/17  9:11 AM   Result Value Ref Range    Glucose (POC) 109 70 - 110 mg/dL   GLUCOSE, POC    Collection Time: 04/17/17 12:29 PM   Result Value Ref Range    Glucose (POC) 121 (H) 70 - 110 mg/dL   GLUCOSE, POC    Collection Time: 04/17/17  4:52 PM   Result Value Ref Range    Glucose (POC) 109 70 - 110 mg/dL               Assessment:     Primary Rehabilitation Diagnosis  1. Impaired Mobility and ADLs  2. Right iliopsoas muscle hematoma, secondary to supratherapeutic INR/anticoagulant therapy      Comorbidities   Nonischemic cardiomyopathy    History of complete heart block    Biventricular implantable cardioverter-defibrillator in situ    Left bundle branch block (LBBB) on electrocardiogram    Type 2 diabetes mellitus with diabetic neuropathy    Dyslipidemia    Diabetic neuropathy associated with type 2 diabetes mellitus    Obstructive sleep apnea on CPAP    History of AICD generator infection     Difficult airway for intubation    Benign hypertensive heart disease with systolic CHF, NYHA class 2     Decreased calculated glomerular filtration rate (GFR)    Chronic anemia    History of deep venous thrombosis    Anticoagulated on Coumadin    Pacemaker twiddler's syndrome    Chronic systolic heart failure     Obesity (BMI 35.0-39.9 without comorbidity)     History of pyelonephritis    History of Coumadin therapy    Gout       Plan:     1. Justification for continued stay: Good progression towards established rehabilitation goals. 2. Medical Issues being followed closely:    [x]  Fall and safety precautions     []  Wound Care     [x]  Bowel and Bladder Function     [x]  Fluid Electrolyte and Nutrition Balance     [x]  Pain Control      3.  Issues that 24 hour rehabilitation nursing is following:    [x]  Fall and safety precautions     []  Wound Care     [x]  Bowel and Bladder Function     [x]  Fluid Electrolyte and Nutrition Balance     [x]  Pain Control      [x]  Assistance with and education on in-room safety with transfers to and from the bed, wheelchair, toilet and shower. 4. Acute rehabilitation plan of care:    [x]  Continue current care and rehab. [x]  Physical Therapy           [x]  Occupational Therapy           []  Speech Therapy     []  Hold Rehab until further notice     5. Medications:    [x]  MAR Reviewed     [x]  Continue Present Medications     6. DVT Prophylaxis:      []  Lovenox     []  Unfractionated Heparin     []  Coumadin     []  Xarelto     [x]  MIMI Stockings     [x]  Sequential Compression Device     []  None     7. Orders:   > Right iliopsoas muscle hematoma, secondary to supratherapeutic INR/anticoagulant therapy   > CT scan of the abdomen and pelvis (3/30/2017) showed an asymmetrically enlarged right iliopsoas muscle compared to the left extending to the right groin region with evidence of intramuscular hematoma in different stage; this is new compared to the recent CT 3/16/17; mild fatty stranding noted in the right pericolonic gutter and the right groin; no additional hematoma or other significant injury seen. > CT scan of the abdomen and pelvis (4/02/2017) showed redemonstration of prominent hematoma within the right psoas muscle, essentially unchanged as compared to previous study on 3/13/2017; around right psoas muscle there are are some hazy densities, indicating mild reactive or congestive changes similar to previous study; otherwise, there is no definable confluent inflammatory phlegmon around right psoas muscle; there is no evidence of hematoma in left psoas muscle or in rest of the retroperitoneum.    > Coumadin had been discontinued on 3/30/2017   > INR (4/6/2017) = 1.3   > Hgb/Hct (4/7/2017) = 8.8/26.5    > Hgb/Hct (4/10/2017) = 9.0/27.2    > Hgb/Hct (4/13/2017) = 8.7/26.8    > Benign hypertensive heart disease with chronic systolic heart failure   > On 4/10/2017, decreased Carvedilol from 12.5 mg to 6.25 mg PO BID with meals (8AM, 5PM)   > Continue Carvedilol 6.25 mg PO BID with meals (8AM, 5PM)     > History of pyelonephritis   > Patient had completed a 5-day treatment course of Aztreonam during his stay at [facility]    > On 4/7/2017, patient was started on Floranex 2 tabs PO BID    > Continue Floranex 2 tabs PO BID     > Type 2 diabetes mellitus with diabetic neuropathy   > On 4/8/2017, decreased Lantus from 24 units SC BID to 25 units SC q HS   > On 4/12/2017, decreased Lantus from 25 units to 20 units SC q HS   > On 4/13/2017, resumed Sitagliptin 50 mg PO once daily   > On 4/15/2017, decreased Lantus from 20 units to 15 units SC q HS   > Continue:    > Sitagliptin 50 mg PO once daily    > Lantus 15 units SC q HS    > Humalog insulin sliding scale SC TID AC only     > Gout / Hyperuricemia    > Prior to admission to [facility], the patient was on Allopurinol 100 mg PO BID   > Uric acid (4/7/2017) = 3.8   > On 4/7/2017, decreased Allopurinol from 100 mg PO BID to 100 mg PO once daily   > Continue Allopurinol 100 mg PO once daily     > Constipation   > On 4/10/2017, discontinued Polyethylene glycol 17 grams in 8 oz water PO once daily q 7PM    > On 4/13/2017, added Naloxegol 25 mg PO once daily before breakfast   > Continue:    > Docusate sodium 100 mg PO once daily after breakfast    > Pericolace 2 tabs PO once daily after dinner    > Naloxegol 25 mg PO once daily before breakfast    > Neurocognitive modulation    > On 4/14/2017, patient was started on:    > Modafinil 100 mg PO q AM    > Melatonin 3 mg PO q HS   > Continue:    > Modafinil 100 mg PO q AM    > Melatonin 3 mg PO q HS     > Analgesia   > On 4/10/2017:    > Increased Gabapentin from 300 mg to 400 mg PO BID    > Increased Percocet 5/325 to Percocet 7.5/325 1 tab PO TID (8AM, 12PM, 4PM)    > Increased Percocet 5/325 to Percocet 7.5/325 1 tab PO q 4 hr PRN for pain level greater than 4/10 (from 8PM to 4AM only)    > On 4/11/2017, added Cyclobenzaprine 5 mg PO q 8 hr   > On 4/14/2017:    > Discontinued Cyclobenzaprine 5 mg PO q 8 hr    > Increased Gabapentin from 400 mg to 600 mg PO BID   > Continue:    > Acetaminophen 650 mg PO q 4 hr PRN for pain level less than 5/10    > Gabapentin 600 mg PO BID    > Percocet 7.5/325 1 tab PO TID (8AM, 12PM, 4PM)    > Percocet 7.5/325 1 tab PO q 4 hr PRN for pain level greater than 4/10 (from 8PM to 4AM only)    > Warm compress to right thigh/hip PRN  Check LE dopplers venous  D/w patient, d/w RN.      Signed:    Maxi Fowler MD      April 17, 2017

## 2017-04-17 NOTE — PROGRESS NOTES
Problem: Self Care Deficits Care Plan (Adult)  Goal: *Therapy Goal (Edit Goal, Insert Text)  STG = Long Term Goals (to be met upon discharge date) in order to increase pts functional independence and safety, and decrease burden of care:  1. Pt will perform grooming with independence. 2. Pt will perform UB bathing with modified independence. 3. Pt will perform LB bathing with modified independence. 4. Pt will perform shower transfer with modified independence. 5. Pt will perform UB dressing with independence. 6. Pt will perform LB dressing with modified independence. 7. Pt will perform toileting task with modified independence. 8. Pt will perform toilet transfer with modified independence. 9. Pt will perform an IADL task while standing with modified independence. OCCUPATIONAL THERAPY DAILY NOTE  Patient Name:Debby Gonzalez   Time In: 8764  Time Out: 200     Time In: 0  Time Out: 56     Medical Diagnosis:  Debility  Psoas hematoma, right, secondary to anticoagulant therapy Psoas hematoma, right, secondary to anticoagulant therapy      Pain at start of tx: 8/10 when seated  Pain at stop of tx: 10/10 when standing with a RW     Patient identified with name and : yes  Subjective: Pt reported she isn't taking the Flexoral anymore since she was getting too drowsy, but now she is having more pain and burning pain. Objective: Therapist noted increased swelling in B legs and feet at the beginning of the session. Nurse was notified and applied compression socks. Pt reported increased pain in legs and feet, but reported decreased pain once compression socks were applied. FT was performed during second session. THERAPEUTIC ACTIVITY Daily Assessment     Pt ambulated from w/c to table with a RW and supervision and stood at the table with a RW to put clothespins on the clothespin tree. Pt stood for 2:02 and attempt 1 clothespin, but was unable to due to burning pain in her abdomin and back.  Pt then ambulated back to her w/c with a RW and supervision to rest. After resting the Pt ambulated back to the table with a RW and supervision and stood at the table for 1:36 without attempting a clothespin and stated she needed to sit due to burning pain. Pt was motivated to try and continue, but was unable to and ambulated back to the w/c with a RW and supervision to rest. Pt was cued to put her R heel down, to make her feet even with each on the floor, and to stand up straight through ambulation and standing activity. Pt continued to lift her R heel up and remained in a forward leaning posture due to pain and would attempt to follow cues, but would go back to previous posture and lift her R heel up. Pt was educated to try and push through the pain, but that she may have good days and bad days where she will be limited more by her pain and unable to push through the pain as much. Pt was educated to learn her limits each day and try to push through the pain as much as she can when she discharges. THERAPEUTIC EXERCISE Daily Assessment     Pt performed 2x10 of shoulder flexion and 1x10 of chest press, bicep curls, and shoulder circular motion with a 3# weighted bar while seated on the edge of her w/c. Pt took rest breaks between exercises and sat back in her w/c to rest between sets. Pt c/o burning and stretching in her lower back. Pt was unable to complete the second set due to end of session. Pt required cues to keep her arms straight during shoulder flexion and to sit up straight with all exercises. ADL/IADL Daily Assessment     Daughter and  were educated on Pt's limitation and capabilities when she is discharged and what she will need help with. Family was also educated on the bridging technique she needs to use when moving items around the kitchen and house instead of hold them in her hand when using the RW and that they can get a walker basket to help her carry items around the house in.  Family was also educated to cue her to put her R heel down, put weight through both legs, and to stand up when she is standing and ambulating to perform tasks. Therapist recommended supervision of Pt when ambulating and transferring with a RW upon discharge and for a shower chair and BSC to be using at home. Family confirmed that PT has a shower bench and a BSC at home she will use. MOBILITY/TRANSFERS Daily Assessment      5 (supervision)  Pt demonstrated shower transfer the way she will have to at home like she practiced on Friday with supervision. Pt forgot to step her L foot over the towel barrier and sat down with her foot over the barrier. Pt was reminded on how we practiced it on Friday and was able to get out of the shower the correct way stepping over the barrier and shifting her hands from the grab bar and walker to both hands on the walker as she stepped over the barrier. Pt required cues to shift her hands during transfer. Pt's daugher and  were educated on the correct way for her to transfer when she gets home and discussed the need for supervision when transferring for safety. Assessment: Pt was limited by pain and was unable to complete activities due to the pain. Pt required increased cues for shower transfer and a demo to remember how to perform it correctly. Pt demonstrated increased pain and forward leaning posture today along with increase swelling in B legs. Plan of Care: Continue with POC to increase independence with I/ADLs and functional transfers.      MARKO Salinas  4/17/2017

## 2017-04-17 NOTE — PROGRESS NOTES
[facility]    Bedside and Verbal shift change report given to Corrinne Leash, RN  (oncoming nurse) by Nohelia García RN   (offgoing nurse). Report included the following information SBAR, Kardex, MAR and Recent Results. Situation:   Code Status: Full Code   Reason for Admission: Psoas hematoma  Hospital Day: 11   Problem List:   Hospital Problems  Date Reviewed: 4/16/2017          Codes Class Noted POA    History of Coumadin therapy ICD-10-CM: Z79.01  ICD-9-CM: V58.61  Unknown Yes    Overview Signed 4/6/2017 10:35 PM by Elisabeth Payne MD     Anticoagulation for chronic atrial fibrillation; Discontinued on 3/30/2017             Decreased calculated glomerular filtration rate (GFR) ICD-10-CM: R94.4  ICD-9-CM: 794.4  3/30/2017 Yes    Overview Signed 4/6/2017  5:47 PM by Elisabeth Payne MD     Calculated GFR equivalent to that of CKD stage 3 = 30-59 ml/min             History of pyelonephritis ICD-10-CM: Z87.440  ICD-9-CM: V13.02  3/30/2017 Yes        Iliopsoas muscle hematoma ICD-10-CM: G22.60EH  ICD-9-CM: 924.00  3/30/2017 Yes        * (Principal)Psoas hematoma, right, secondary to anticoagulant therapy ICD-10-CM: S30. 1XXA  ICD-9-CM: 924.9, E934.2  3/30/2017 Yes        Generalized weakness ICD-10-CM: R53.1  ICD-9-CM: 780.79  3/30/2017 Yes        Impaired mobility and ADLs ICD-10-CM: Z74.09  ICD-9-CM: 799.89  3/30/2017 Yes        Benign hypertensive heart disease with systolic CHF, NYHA class 2 (HCC) (Chronic) ICD-10-CM: I11.0, I50.20  ICD-9-CM: 402.11, 428.20, 428.0  9/5/2012 Yes        Type 2 diabetes mellitus with diabetic neuropathy (HCC) (Chronic) ICD-10-CM: E11.40  ICD-9-CM: 250.60, 357.2  6/28/2011 Yes              Background:   Past Medical History:   Past Medical History:   Diagnosis Date    Benign hypertensive heart disease with systolic CHF, NYHA class 2 ([facility] [address] 9/5/2012    Biventricular implantable cardioverter-defibrillator in situ 04/28/2005    Upgraded to BiV AICD; gen change 4/2008; pocket revision 10/2009; Abdominal - done on 8/22/2012 by Dr. Ruby Dutton Cardiac cath 08/15/1996    Patent coronaries. Elev LVEDP. EF 50-55%.  Cardiac echocardiogram 06/23/2015    Ltd study. EF 45-50%. Mild, diffuse hypk. Severe apical hypk. No mass or thrombus was clearly identified, although imaging was suboptimal.      Cardiac nuclear imaging test 06/19/2015    Fixed distal apical, distal septal defect more likely due to RV pacing than prior infarct. No ischemia. EF 46%. RWMA c/w RV pacing. Nondiagnostic EKG on pharm stress test.      Cardiovascular lower extremity venous duplex 09/04/2012    Acute, non-occlusive DVT in CFV on right. No DVT on left. No superficial thrombosis bilaterally.  Cardiovascular upper extremity venous duplex 08/27/2012    DVT in axillary vein on left. Left subclavian was not visualized.     Chronic anemia 9/5/2012    Chronic systolic heart failure (HCC)     Decreased calculated glomerular filtration rate (GFR) 3/30/2017    Calculated GFR equivalent to that of CKD stage 3 = 30-59 ml/min    Diabetic neuropathy associated with type 2 diabetes mellitus ([address] 6/28/2011    Difficult airway for intubation 08/22/2012    see anesthesia airway note    Dyslipidemia 6/28/2011    Gout     History of complete heart block 6/28/2011    History of Coumadin therapy     Anticoagulation for DVT of the LUE; Discontinued on 3/30/2017    History of deep venous thrombosis 9/5/2012    Left upper extremity    History of pyelonephritis 3/30/2017    Left bundle branch block (LBBB) on electrocardiogram 6/28/2011    Nonischemic cardiomyopathy ([address] 6/28/2011    Obesity (BMI 35.0-39.9 without comorbidity) ([address] 3/13/2017    Obstructive sleep apnea on CPAP 2/7/2012    Psoas hematoma, right, secondary to anticoagulant therapy 3/30/2017    Type 2 diabetes mellitus with diabetic neuropathy ([address] 6/28/2011      Patient taking anticoagulants no    Patient has a defibrillator: yes Assessment:   Changes in Assessment throughout shift: no     Patient has central line: no Reasons if yes: Insertion date: Last dressing date:   Patient has Krueger Cath: no Reasons if yes: Insertion date:     Last Vitals:     Vitals:    04/16/17 0744 04/16/17 1605 04/16/17 1930 04/17/17 0944   BP: 139/72 139/71 138/68 153/79   Pulse: 74 76 82 88   Resp: 19 20 19 19   Temp: 98.3 °F (36.8 °C) 99.8 °F (37.7 °C) 100.2 °F (37.9 °C) 99.2 °F (37.3 °C)   SpO2: 97% 97% 99% 97%   Weight:       Height:            PAIN    Pain Assessment    Pain Intensity 1: 8 (04/17/17 1200) Pain Intensity 1: 2 (12/29/14 1105)    Pain Location 1: Back Pain Location 1: Abdomen    Pain Intervention(s) 1: Medication (see MAR) Pain Intervention(s) 1: Medication (see MAR)  Patient Stated Pain Goal: 5 Patient Stated Pain Goal: 0  o Intervention effective: no  o Other actions taken for pain: pain medicine     Skin Assessment  Skin color Skin Color: Appropriate for ethnicity  Condition/Temperature Skin Condition/Temp: Warm  Integrity Skin Integrity: Intact  Turgor    Weekly Pressure Ulcer Documentation Weekly Pressure Ulcer Documentation: No Pressure Ulcer Noted-Pressure Ulcer Prevention Initiated  Wound Prevention & Protection Methods  Orientation of wound Orientation of Wound Prevention: Posterior  Location of Prevention Location of Wound Prevention: Sacrum/Coccyx  Dressing Present Dressing Present : No  Dressing Status    Wound Offloading Wound Offloading (Prevention Methods): Bed, pressure redistribution/air, Chair cushion, Wheelchair     INTAKE/OUPUT    Date 04/16/17 0700 - 04/17/17 0659 04/17/17 0700 - 04/18/17 0659   Shift [id number] 4801-2807 24 Hour Total 0182-5585 0814-8441 24 Hour Total   I  N  T  A  K  E   P.O. 487  487 120  120      P. O. 487  487 120  120    Shift Total  (mL/kg) 487  (4.6)  487  (4.6) 120  (1.1)  120  (1.1)   O  U  T  P  U  T   Urine  (mL/kg/hr)            Urine Occurrence(s) 5 x 1 x 6 x 2 x  2 x    Stool Stool Occurrence(s) 0 x 0 x 0 x 1 x  1 x    Shift Total  (mL/kg)           935 120  120   Weight (kg) 105.7 105.7 105.7 105.7 105.7 105.7       Recommendations:  1. Patient needs and requests: pain medicine    2. Diet: diabetic    3. Pending tests/procedures: no     4. Functional Level/Equipment: 1 x person assist    5. Estimated Discharge Date: TDB Posted on Whiteboard in Patients Room: no     [facility] Safety Check    A safety check occurred in the patient's room between off going nurse and oncoming nurse listed above. The safety check included the below items  Area Items   H  High Alert Medications - Verify all high alert medication drips (heparin, PCA, etc.)   E  Equipment - Suction is set up for ALL patients (with vania)  - Red plugs utilized for all equipment (IV pumps, etc.)  - WOWs wiped down at end of shift.  - Room stocked with oxygen, suction, and other unit-specific supplies   A  Alarms - Bed alarm is set for fall risk patients  - Ensure chair alarm is in place and activated if patient is up in a chair   L  Lines - Check IV for any infiltration  - Krueger bag is empty if patient has a Krueger   - Tubing and IV bags are labeled   S  Safety   - Room is clean, patient is clean, and equipment is clean. - Hallways are clear from equipment besides carts. - Fall bracelet on for fall risk patients  - Ensure room is clear and free of clutter  - Suction is set up for ALL patients (with vania)  - Hallways are clear from equipment besides carts.    - Isolation precautions followed, supplies available outside room, sign posted

## 2017-04-18 LAB
GLUCOSE BLD STRIP.AUTO-MCNC: 137 MG/DL (ref 70–110)
GLUCOSE BLD STRIP.AUTO-MCNC: 140 MG/DL (ref 70–110)
GLUCOSE BLD STRIP.AUTO-MCNC: 160 MG/DL (ref 70–110)
GLUCOSE BLD STRIP.AUTO-MCNC: 166 MG/DL (ref 70–110)

## 2017-04-18 RX ORDER — INSULIN GLARGINE 100 [IU]/ML
15 INJECTION, SOLUTION SUBCUTANEOUS
Qty: 1 VIAL | Refills: 0 | Status: ON HOLD | OUTPATIENT
Start: 2017-04-18 | End: 2017-05-25

## 2017-04-18 RX ORDER — PRAVASTATIN SODIUM 20 MG/1
40 TABLET ORAL
Status: DISCONTINUED | OUTPATIENT
Start: 2017-04-19 | End: 2017-04-20 | Stop reason: HOSPADM

## 2017-04-18 RX ORDER — ALLOPURINOL 100 MG/1
100 TABLET ORAL DAILY
Qty: 15 TAB | Refills: 0 | Status: SHIPPED | OUTPATIENT
Start: 2017-04-18 | End: 2017-05-25

## 2017-04-18 RX ORDER — CARVEDILOL 25 MG/1
25 TABLET ORAL 2 TIMES DAILY WITH MEALS
Status: ON HOLD | COMMUNITY
End: 2017-04-18

## 2017-04-18 RX ORDER — INSULIN GLARGINE 100 [IU]/ML
20 INJECTION, SOLUTION SUBCUTANEOUS 2 TIMES DAILY
Status: ON HOLD | COMMUNITY
End: 2017-04-18

## 2017-04-18 RX ORDER — MELATONIN
2000 DAILY
Status: DISCONTINUED | OUTPATIENT
Start: 2017-04-19 | End: 2017-04-20 | Stop reason: HOSPADM

## 2017-04-18 RX ORDER — OXYCODONE AND ACETAMINOPHEN 7.5; 325 MG/1; MG/1
1 TABLET ORAL
Qty: 50 TAB | Refills: 0 | Status: SHIPPED | OUTPATIENT
Start: 2017-04-18 | End: 2017-05-25

## 2017-04-18 RX ORDER — AMOXICILLIN 250 MG
2 CAPSULE ORAL
Qty: 30 TAB | Refills: 0 | Status: SHIPPED | OUTPATIENT
Start: 2017-04-18 | End: 2018-03-12

## 2017-04-18 RX ORDER — DOCUSATE SODIUM 100 MG/1
100 CAPSULE, LIQUID FILLED ORAL
Qty: 15 CAP | Refills: 0 | Status: SHIPPED | OUTPATIENT
Start: 2017-04-18 | End: 2017-05-25

## 2017-04-18 RX ORDER — CARVEDILOL 6.25 MG/1
6.25 TABLET ORAL 2 TIMES DAILY WITH MEALS
Qty: 30 TAB | Refills: 0 | Status: SHIPPED | OUTPATIENT
Start: 2017-04-18 | End: 2017-05-25

## 2017-04-18 RX ORDER — ACETAMINOPHEN 325 MG/1
650 TABLET ORAL
Qty: 30 TAB | Refills: 0 | Status: SHIPPED | OUTPATIENT
Start: 2017-04-18 | End: 2018-05-22

## 2017-04-18 RX ORDER — GABAPENTIN 400 MG/1
400 CAPSULE ORAL 2 TIMES DAILY
Status: DISCONTINUED | OUTPATIENT
Start: 2017-04-19 | End: 2017-04-20 | Stop reason: HOSPADM

## 2017-04-18 RX ORDER — ONDANSETRON 4 MG/1
4 TABLET, ORALLY DISINTEGRATING ORAL
COMMUNITY
End: 2017-09-15

## 2017-04-18 RX ORDER — BUMETANIDE 1 MG/1
1 TABLET ORAL 2 TIMES DAILY
Status: DISCONTINUED | OUTPATIENT
Start: 2017-04-19 | End: 2017-04-20

## 2017-04-18 RX ORDER — POTASSIUM CHLORIDE 20 MEQ/1
20 TABLET, EXTENDED RELEASE ORAL 2 TIMES DAILY
COMMUNITY
End: 2017-05-25

## 2017-04-18 RX ORDER — CYCLOBENZAPRINE HCL 10 MG
10 TABLET ORAL AS NEEDED
COMMUNITY
End: 2017-05-25

## 2017-04-18 RX ADMIN — ALLOPURINOL 100 MG: 100 TABLET ORAL at 08:55

## 2017-04-18 RX ADMIN — LACTOBACILLUS TAB 2 TABLET: TAB at 18:15

## 2017-04-18 RX ADMIN — MODAFINIL 100 MG: 100 TABLET ORAL at 08:55

## 2017-04-18 RX ADMIN — LACTOBACILLUS TAB 2 TABLET: TAB at 12:28

## 2017-04-18 RX ADMIN — GABAPENTIN 600 MG: 300 CAPSULE ORAL at 18:15

## 2017-04-18 RX ADMIN — INSULIN GLARGINE 15 UNITS: 100 INJECTION, SOLUTION SUBCUTANEOUS at 21:40

## 2017-04-18 RX ADMIN — STANDARDIZED SENNA CONCENTRATE AND DOCUSATE SODIUM 2 TABLET: 8.6; 5 TABLET, FILM COATED ORAL at 18:15

## 2017-04-18 RX ADMIN — SITAGLIPTIN 50 MG: 50 TABLET, FILM COATED ORAL at 08:55

## 2017-04-18 RX ADMIN — GABAPENTIN 600 MG: 300 CAPSULE ORAL at 08:53

## 2017-04-18 RX ADMIN — OXYCODONE HYDROCHLORIDE AND ACETAMINOPHEN 1 TABLET: 7.5; 325 TABLET ORAL at 03:58

## 2017-04-18 RX ADMIN — CARVEDILOL 6.25 MG: 3.12 TABLET, FILM COATED ORAL at 08:54

## 2017-04-18 RX ADMIN — CARVEDILOL 6.25 MG: 3.12 TABLET, FILM COATED ORAL at 18:15

## 2017-04-18 RX ADMIN — FOLIC ACID 1 MG: 1 TABLET ORAL at 08:54

## 2017-04-18 RX ADMIN — DOCUSATE SODIUM 100 MG: 100 CAPSULE, LIQUID FILLED ORAL at 08:54

## 2017-04-18 RX ADMIN — NALOXEGOL OXALATE 25 MG: 25 TABLET, FILM COATED ORAL at 06:43

## 2017-04-18 RX ADMIN — OXYCODONE HYDROCHLORIDE AND ACETAMINOPHEN 1 TABLET: 7.5; 325 TABLET ORAL at 21:58

## 2017-04-18 RX ADMIN — INSULIN LISPRO 2 UNITS: 100 INJECTION, SOLUTION INTRAVENOUS; SUBCUTANEOUS at 12:27

## 2017-04-18 RX ADMIN — OXYCODONE HYDROCHLORIDE AND ACETAMINOPHEN 1 TABLET: 7.5; 325 TABLET ORAL at 12:29

## 2017-04-18 RX ADMIN — OXYCODONE HYDROCHLORIDE AND ACETAMINOPHEN 1 TABLET: 7.5; 325 TABLET ORAL at 18:15

## 2017-04-18 RX ADMIN — ACETAMINOPHEN 650 MG: 325 TABLET ORAL at 06:43

## 2017-04-18 RX ADMIN — MELATONIN TAB 3 MG 3 MG: 3 TAB at 21:41

## 2017-04-18 RX ADMIN — OXYCODONE HYDROCHLORIDE AND ACETAMINOPHEN 1 TABLET: 7.5; 325 TABLET ORAL at 08:55

## 2017-04-18 NOTE — PROGRESS NOTES
Problem: Mobility Impaired (Adult and Pediatric)  Goal: *Acute Goals and Plan of Care (Insert Text)        Physical Therapy Long Term Goals  Initiated 2017 and to be accomplished by 2017  1. Patient will move from supine to sit and sit to supine , scoot up and down and roll side to side in bed with modified independence. 2. Patient will transfer from bed to chair and chair to bed with SPV using the least restrictive device. 3. Patient will perform sit to stand with distant SPV. 4. Patient will ambulate with supervision/set-up for 100 feet with the least restrictive device. 5. Patient will ascend/descend 3 stairs with 1 handrail(s) with minimal assistance/contact guard assist.   PHYSICAL THERAPY DISCHARGE SUMMARY  Patient Yanni LEUNG Gonzalez   Precautions at discharge: Fall risk; Risk for R Psoas contracture     Pain at start of tx:8/10  Pain at stop of tx:8/10     Patient identified with name and : YES     Patient performs treatment as noted below, however she is unable to participate in full session 2/2 pain and lack of motivation leading to poor performance and unsafe conditions with PT challenges. DC summary does not represent highest functional level of patient.      Problem List:    Decreased strength B LE  [X]     Decreased strength trunk/core  [X]     Decreased AROM   [X]     Decreased PROM  [X]     Decreased balance sitting  [ ]     Decreased balance standing  [X]     Decreased endurance  [X]     Pain  [X]        Functional Limitations:   Decreased independence with bed mobility  [X]     Decreased independence with functional transfers  [X]     Decreased independence with ambulation  [X]     Decreased independence with stair negotiation  [X]                Outcome Measures: FIM/MMT                 MMT Initial Assessment    Right Lower Extremity Left Lower Extremity   Hip Flexion 2+ 4-   Knee Extension 3+ 4+   Knee Flexion 3+ 4+   Ankle Dorsiflexion 4 4+                  MMT Discharge Assessment    Right Lower Extremity Left Lower Extremity   Hip Flexion Unable 2/2 sig P! Unable 2/2 sig P! Knee Extension Unable 2/2 sig P! Unable 2/2 sig P! Knee Flexion Unable 2/2 sig P! Unable 2/2 sig P! Ankle Dorsiflexion Unable 2/2 sig P! Unable 2/2 sig P!   0/5       No palpable muscle contraction  1/5       Palpable muscle contraction, no joint movement  2-/5      Less than full range of motion in gravity eliminated position  2/5       Able to complete full range of motion in gravity eliminated position  2+/5     Able to initiate movement against gravity  3-/5      More than half but not full range of motion against gravity  3/5       Able to complete full range of motion against gravity  3+/5     Completes full range of motion against gravity with minimal resistance  4-/5      Completes full range of motion against gravity with minimal-moderate resistance  4/5       Completes full range of motion against gravity with moderate resistance  4+/5     Completes full range of motion against gravity with moderate-maximum resistance  5/5       Completes full range of motion against gravity with maximum resistance                 AROM: Grossly limited      FIM SCORES Initial Assessment Discharge Assessment   Bed/Chair/Wheelchair Transfers 1 (2/2 supine<>sit level) 4   Wheelchair Mobility 5 5   Walking [place] 0 (Pt unable) 1   Steps/Stairs 0 1   PRIMARY MODE OF LOCOMOTION: W/C  Please see IRC Interdisciplinary Eval: Coordination/Balance Section for details regarding FIM score description.       BED/CHAIR/WHEELCHAIR TRANSFERS Initial Assessment Discharge Assessment   Rolling Right 1 (Total assistance) (unable to assess 2/2 inability to lay supine) 5 (Supervision)   Rolling Left 1 (Total assistance) (unable to assess 2/2 inability to lay supine) 5 (Supervision)   Supine to Sit 1 (Total assistance) (unable to assess 2/2 inability to lay supine) 5 (Supervision)   Sit to Stand Minimal assistance Contact guard assistance   Sit to Supine 1 (Total assistance) (unable to assess 2/2 inability to lay supine) 5 (Supervision)   Transfer Assist Score 1 (2/2 supine<>sit level) 4   Transfer Type Other Other   Comments Patient performs stand step transfer with anterior aproach, B UE support, weight shift cues, step placement cues and controlled lowering. Patient with significant antalgia with R LE loading and forward flexed posturing requiring assist to achieve correct LE placement. Patient performs stand step transfer with cami x 1  for postural assist and R LE advancement assist required   Car Transfer Not tested Not tested   Car Type NA NA          WHEELCHAIR MOBILITY/MANAGEMENT Initial Assessment Discharge Assessment   Able to Propel 155 feet (B UE technique with SPV verbal cues for technique) 250 feet   Functional Level 5 5   Curbs/ramps assistance required 0 (Not tested) 0 (Not tested)   Wheelchair set up assistance required 3 (Moderate assistance) 4 (Minimal assistance)   Wheelchair management Manages left brake, Manages right brake (w/ extender, verbal and visual cues + min a x placement) Manages left brake;Manages right brake;Manages left footrest;Manages right footrest          WALKING INDEPENDENCE Initial Assessment Discharge Assessment   Assistive device  (NA) Gait belt;Walker, rolling   Ambulation assistance - level surface NT Min a x 1   Distance 0 Feet (ft) 12 Feet (ft) (x 2 trials)   Functional Level 0 (Pt unable) 1   Comments NA 12 ft at min a x 1 w/ RW   Ambulation assistance - unlevel surface NA NA          STEPS/STAIRS Initial Assessment Discharge Assessment   Steps/Stairs ambulated 0 (Pt unable) 3   Rail Use  (NA) Both   Functional Level 0 1   Comments Pt unable 3 steps B UE support on HR; max a x 1   Curbs/Ramps NA NA              PHYSICAL THERAPY PLAN OF CARE     LTGs: All LTG's remain unmet at this time 2/2 lack of motivation and pain preventing full potential for rehab.      Pt would benefit from continued skilled physical therapy in order to improve independent functional mobility within the home with use of least restrictive device. Interventions may include range of motion (AROM, PROM B LE/trunk), motor function (B LE/trunk strengthening/coordination), activity tolerance (vitals, oxygen saturation levels), bed mobility training, balance activities, gait training (progressive ambulation program), and functional transfer training. Pt to be discharged 04/18/2017 with assistance provided by family. Therapy Recommendations upon discharge: Home health  Equipment needs at discharge: W/C     Please see IRC; Interdisciplinary Eval, Care Plan, and Patient Education for further information regarding physical therapy discharge summary and plan of care. Balaji Velasquez, PT DPT  4/18/2017

## 2017-04-18 NOTE — PROGRESS NOTES
[facility] PHYSICAL REHABILITATION  [address] [place] [address], [address]     INPATIENT REHABILITATION  DAILY PROGRESS NOTE     Date: 4/18/2017    Name: Rachel Maravilla Age / Sex: 77 y.o. / female   CSN: [id number] MRN: [id number]   [facility] Date: 4/6/2017 Length of Stay: 12 days     Primary Rehab Diagnosis: Impaired Mobility and ADLs secondary to Right iliopsoas muscle hematoma, secondary to supratherapeutic INR/anticoagulant therapy      Subjective:     Patient seen and examined. Blood pressure controlled. Blood sugars controlled. Patient's Complaint:   Weakness of right leg since dose of Gabapentin was last increased. Pain Control: ongoing significant pain in which is stable and controlled by current meds      Objective:     Vital Signs:  Patient Vitals for the past 24 hrs:   BP Temp Pulse Resp SpO2   04/18/17 0832 139/71 98.2 °F (36.8 °C) 84 18 93 %   04/17/17 2116 168/82 98.9 °F (37.2 °C) 95 19 97 %   04/17/17 1637 159/78 99.1 °F (37.3 °C) 82 18 91 %        Physical Exam:  GENERAL SURVEY: Patient is awake, alert, oriented x 3, sitting comfortably on the chair, not in acute respiratory distress. HEENT: pink palpebral conjunctivae, anicteric sclerae, no nasoaural discharge, moist oral mucosa  NECK: supple, no jugular venous distention, no palpable lymph nodes  CHEST/LUNGS: symmetrical chest expansion, good air entry, clear breath sounds  HEART: adynamic precordium, good S1 S2, no S3, regular rhythm, no murmurs  ABDOMEN: obese, bowel sounds appreciated, soft, non-tender  EXTREMITIES: pink nailbeds, no edema, full and equal pulses, no calf tenderness   NEUROLOGICAL EXAM: The patient is awake, alert and oriented x3, able to answer questions fairly appropriately, able to follow 1 and 2 step commands. Able to tell time from the wall clock. Cranial nerves II-XII are grossly intact. No gross sensory deficit.  Motor strength is 4+/5 on BUE, 4-/5 on the left hip, 4+/5 on the left knee and left ankle, 2+/5 on the right hip (due to pain), 3+/5 on the right knee (due to right hip pain), 4/5 on the right ankle. Current Medications:  Current Facility-Administered Medications   Medication Dose Route Frequency    insulin glargine (LANTUS) injection 15 Units  15 Units SubCUTAneous QHS    gabapentin (NEURONTIN) capsule 600 mg  600 mg Oral BID    modafinil (PROVIGIL) tablet 100 mg  100 mg Oral DAILY    melatonin tablet 3 mg  3 mg Oral QHS    SITagliptin (JANUVIA) tablet 50 mg  50 mg Oral DAILY    naloxegol (MOVANTIK) tablet 25 mg  25 mg Oral ACB    carvedilol (COREG) tablet 6.25 mg  6.25 mg Oral BID WITH MEALS    oxyCODONE-acetaminophen (PERCOCET 7.5) 7.5-325 mg per tablet 1 Tab  1 Tab Oral TID    oxyCODONE-acetaminophen (PERCOCET 7.5) 7.5-325 mg per tablet 1 Tab  1 Tab Oral Q4H PRN    ondansetron hcl (ZOFRAN) tablet 4 mg  4 mg Oral TID PRN    allopurinol (ZYLOPRIM) tablet 100 mg  100 mg Oral DAILY    docusate sodium (COLACE) capsule 100 mg  100 mg Oral DAILY AFTER BREAKFAST    senna-docusate (PERICOLACE) 8.6-50 mg per tablet 2 Tab  2 Tab Oral PCD    acetaminophen (TYLENOL) tablet 650 mg  650 mg Oral Q4H PRN    bisacodyl (DULCOLAX) tablet 10 mg  10 mg Oral Q48H PRN    insulin lispro (HUMALOG) injection   SubCUTAneous TIDAC    folic acid (FOLVITE) tablet 1 mg  1 mg Oral DAILY    glucose chewable tablet 16 g  4 Tab Oral PRN    glucagon (GLUCAGEN) injection 1 mg  1 mg IntraMUSCular PRN    dextrose (D50W) injection syrg 12.5-25 g  25-50 mL IntraVENous PRN    Lactobacillus Acidoph & Bulgar (FLORANEX) tablet 2 Tab  2 Tab Oral BID       Allergies:   Allergies   Allergen Reactions    Vancomycin Itching    Ampicillin Itching    Bactrim [Sulfamethoxazole-Trimethoprim] Unknown (comments)    Blueberry Swelling     Causes throat swelling    Ciprofloxacin Itching    Codeine Other (comments)     Jumpy feeling    Crestor [Rosuvastatin] Itching    Darvocet A500 [Propoxyphene N-Acetaminophen] Itching    Demerol [Meperidine] Itching    Levaquin [Levofloxacin] Itching    Lipitor [Atorvastatin] Myalgia    Magnesium Oxide Itching     nausea    Minocin [Minocycline] Unknown (comments)    Pcn [Penicillins] Itching    Pravachol [Pravastatin] Swelling     Swelling in mouth     Sulfa (Sulfonamide Antibiotics) Itching    Ultracet [Tramadol-Acetaminophen] Itching    Vicodin [Hydrocodone-Acetaminophen] Unknown (comments)    Vytorin 10-10 [Ezetimibe-Simvastatin] Myalgia    Percodan [Oxycodone Hcl-Oxycodone-Asa] Itching       Functional Progress:    OCCUPATIONAL THERAPY    ON ADMISSION MOST RECENT   Eating  Functional Level: 7   Eating  Functional Level: 6     Grooming  Functional Level: 5   Grooming  Functional Level: 7     Bathing  Functional Level: 3   Bathing  Functional Level: 6     Upper Body Dressing  Functional Level: 5   Upper Body Dressing  Functional Level: 6     Lower Body Dressing  Functional Level: 3   Lower Body Dressing  Functional Level: 5     Toileting  Functional Level: 4   Toileting  Functional Level: 5     Toilet Transfers  Toilet Transfer Score: 4   Toilet Transfers  Toilet Transfer Score: 5     Tub /Shower Transfers  Tub/Shower Transfer Score: 0   Tub/Shower Transfers  Tub/Shower Transfer Score: 5       Legend:   7 - Independent   6 - Modified Independent   5 - Standby Assistance / Supervision / Set-up   4 - Minimum Assistance / Contact Guard Assistance   3 - Moderate Assistance   2 - Maximum Assistance   1 - Total Assistance / Dependent       Lab/Data Review:  Recent Results (from the past 24 hour(s))   GLUCOSE, POC    Collection Time: 04/17/17  4:52 PM   Result Value Ref Range    Glucose (POC) 109 70 - 110 mg/dL   GLUCOSE, POC    Collection Time: 04/17/17  8:42 PM   Result Value Ref Range    Glucose (POC) 163 (H) 70 - 110 mg/dL   GLUCOSE, POC    Collection Time: 04/18/17  8:00 AM   Result Value Ref Range    Glucose (POC) 140 (H) 70 - 110 mg/dL   GLUCOSE, POC    Collection Time: 04/18/17 11:54 AM Result Value Ref Range    Glucose (POC) 166 (H) 70 - 110 mg/dL       Estimated Glomerular Filtration Rate:  CKD-EPI:   On admission, estimated GFR was 53.5 mL/min/1.73m2 based on a Creatinine of 1.22 mg/dl. Most recent estimated GFR was 67.2 mL/min/1.73m2 based on a Creatinine of 1.01 mg/dl. MDRD:   On admission, estimated GFR was 56.7 mL/min/1.73m2 based on a Creatinine of 1.22 mg/dl. Most recent estimated GFR was 70.5 mL/min/1.73m based on a Creatinine of 1.01 mg/dl. 2. Assessment:     Primary Rehabilitation Diagnosis  1. Impaired Mobility and ADLs  2. Right iliopsoas muscle hematoma, secondary to supratherapeutic INR/anticoagulant therapy      Comorbidities   Nonischemic cardiomyopathy    History of complete heart block    Biventricular implantable cardioverter-defibrillator in situ    Left bundle branch block (LBBB) on electrocardiogram    Type 2 diabetes mellitus with diabetic neuropathy    Dyslipidemia    Diabetic neuropathy associated with type 2 diabetes mellitus    Obstructive sleep apnea on CPAP    History of AICD generator infection     Difficult airway for intubation    Benign hypertensive heart disease with systolic CHF, NYHA class 2     Decreased calculated glomerular filtration rate (GFR)    Chronic anemia    History of deep venous thrombosis    Anticoagulated on Coumadin    Pacemaker twiddler's syndrome    Chronic systolic heart failure     Obesity (BMI 35.0-39.9 without comorbidity)     History of pyelonephritis    History of Coumadin therapy    Gout       Plan:     1. Justification for continued stay: Good progression towards established rehabilitation goals. 2. Medical Issues being followed closely:    [x]  Fall and safety precautions     []  Wound Care     [x]  Bowel and Bladder Function     [x]  Fluid Electrolyte and Nutrition Balance     [x]  Pain Control      3.  Issues that 24 hour rehabilitation nursing is following:    [x]  Fall and safety precautions []  Wound Care     [x]  Bowel and Bladder Function     [x]  Fluid Electrolyte and Nutrition Balance     [x]  Pain Control      [x]  Assistance with and education on in-room safety with transfers to and from the bed, wheelchair, toilet and shower. 4. Acute rehabilitation plan of care:    [x]  Continue current care and rehab. [x]  Physical Therapy           [x]  Occupational Therapy           []  Speech Therapy     []  Hold Rehab until further notice     5. Medications:    [x]  MAR Reviewed     [x]  Continue Present Medications     6. DVT Prophylaxis:      []  Lovenox     []  Unfractionated Heparin     []  Coumadin     []  Xarelto     [x]  MIMI Stockings     [x]  Sequential Compression Device     []  None     7. Orders:   > Right iliopsoas muscle hematoma, secondary to supratherapeutic INR/anticoagulant therapy   > CT scan of the abdomen and pelvis (3/30/2017) showed an asymmetrically enlarged right iliopsoas muscle compared to the left extending to the right groin region with evidence of intramuscular hematoma in different stage; this is new compared to the recent CT 3/16/17; mild fatty stranding noted in the right pericolonic gutter and the right groin; no additional hematoma or other significant injury seen. > CT scan of the abdomen and pelvis (4/02/2017) showed redemonstration of prominent hematoma within the right psoas muscle, essentially unchanged as compared to previous study on 3/13/2017; around right psoas muscle there are are some hazy densities, indicating mild reactive or congestive changes similar to previous study; otherwise, there is no definable confluent inflammatory phlegmon around right psoas muscle; there is no evidence of hematoma in left psoas muscle or in rest of the retroperitoneum.    > Coumadin had been discontinued on 3/30/2017   > INR (4/6/2017) = 1.3   > Hgb/Hct (4/7/2017) = 8.8/26.5    > Hgb/Hct (4/10/2017) = 9.0/27.2    > Hgb/Hct (4/13/2017) = 8.7/26.8    > Hgb/Hct (4/17/2017) = 9.3/28.3      > Benign hypertensive heart disease with chronic systolic heart failure   > On 4/10/2017, decreased Carvedilol from 12.5 mg to 6.25 mg PO BID with meals (8AM, 5PM)   > Continue Carvedilol 6.25 mg PO BID with meals (8AM, 5PM)     > History of pyelonephritis   > Patient had completed a 5-day treatment course of Aztreonam during his stay at [facility]    > On 4/7/2017, patient was started on Floranex 2 tabs PO BID    > Continue Floranex 2 tabs PO BID     > Type 2 diabetes mellitus with diabetic neuropathy   > On 4/8/2017, decreased Lantus from 24 units SC BID to 25 units SC q HS   > On 4/12/2017, decreased Lantus from 25 units to 20 units SC q HS   > On 4/13/2017, resumed Sitagliptin 50 mg PO once daily   > On 4/15/2017, decreased Lantus from 20 units to 15 units SC q HS   > Continue:    > Sitagliptin 50 mg PO once daily    > Lantus 15 units SC q HS    > Humalog insulin sliding scale SC TID AC only     > Gout / Hyperuricemia    > Prior to admission to [facility], the patient was on Allopurinol 100 mg PO BID   > Uric acid (4/7/2017) = 3.8   > On 4/7/2017, decreased Allopurinol from 100 mg PO BID to 100 mg PO once daily   > Continue Allopurinol 100 mg PO once daily     > Constipation   > On 4/10/2017, discontinued Polyethylene glycol 17 grams in 8 oz water PO once daily q 7PM    > Add Naloxegol 25 mg PO once daily before breakfast   > Continue:    > Docusate sodium 100 mg PO once daily after breakfast    > Pericolace 2 tabs PO once daily after dinner    > Neurocognitive modulation    > On 4/14/2017, patient was started on:    > Modafinil 100 mg PO q AM    > Melatonin 3 mg PO q HS   > Continue:    > Modafinil 100 mg PO q AM    > Melatonin 3 mg PO q HS    > Swelling of both lower extremities    > Venous duplex ultrasound of both lower extremities (4/18/2017) showed no evidence of deep venous thrombosis detected in the veins visualized.     > Analgesia   > On 4/10/2017:    > Increased Gabapentin from 300 mg to 400 mg PO BID    > Increased Percocet 5/325 to Percocet 7.5/325 1 tab PO TID (8AM, 12PM, 4PM)    > Increased Percocet 5/325 to Percocet 7.5/325 1 tab PO q 4 hr PRN for pain level greater than 4/10 (from 8PM to 4AM only)    > On 4/11/2017, added Cyclobenzaprine 5 mg PO q 8 hr   > On 4/14/2017:    > Discontinued Cyclobenzaprine 5 mg PO q 8 hr    > Increased Gabapentin from 400 mg to 600 mg PO BID   > Continue:    > Acetaminophen 650 mg PO q 4 hr PRN for pain level less than 5/10    > Decrease Gabapentin from 600 mg to 400 mg PO BID    > Percocet 7.5/325 1 tab PO TID (8AM, 12PM, 4PM)    > Percocet 7.5/325 1 tab PO q 4 hr PRN for pain level greater than 4/10 (from 8PM to 4AM only)    > Warm compress to right thigh/hip PRN      8. Patient's progress in rehabilitation and medical issues discussed with the patient. All questions answered to the best of my ability. Care plan discussed with patient and nurse. 9. Discharge Planning:   > For discharge to home tomorrow   > Home health physical therapy and occupational therapy   > F/U: 1.  PCP (Dr. Denver Schilder)    2. Cardiology (Dr. Ivonne Marshall)      Signed:    Peter Pool MD    April 18, 2017

## 2017-04-18 NOTE — ROUTINE PROCESS
0800 Pt. Awake sitting up in bed alert and oriented x 4 no change in assessment pt. Reported to be feeling fine. 0930 Pt. Sitting up in chair eating breakfast.  1200 Pt. Tolerated Physical therapy well. 1330 Pt. Able to transfer by wheelchair with no problem. 1500 no change in assessment pt. Reported to be feeling fine. 1800 Pt. Sitting up in chair eating dinner.

## 2017-04-18 NOTE — PROGRESS NOTES
[x] Psychology  [] Social Work [] Recreational Therapy    INTERVENTION  UNITS/TIME OF SERVICE   Assessment April 17, 2017   Supportive Counseling    Orientation    Discharge Planning    Resource Linkage              Progress/Current Status    Patient seen for Psychological Evaluation as requested on her admission to ARU by Dr. Junior Chaparro. She has actually been in treatment on ARU during much of my absence from unit and is now scheduled for discharge later this week. She expects to be at home and well supported by both spouse and her daughter, each who work different shifts and will be available to assist when they are not at work. Patient is pensive on my contact and recounts continued concerns for herself and identifies various maladies, including her immediate concern about ankle swelling during the past forty eight hours. She was encouraged to discuss immediate medical concerns with MD during his rounds. Otherwise, patient will complete family teaching later today; she was instructed to focus on safety and pace and how she will utilize her family's support to ensure her safety on return to home. This seems especially important, given her unresolved concerns for recent fall/injury and her worry that she might suffer further harm to herself.     Víctor Wick, PHD 4/17/2017 9:30 PM

## 2017-04-18 NOTE — CONSULTS
ARU PSYCHOLOGICAL SCREENING    Assessment Initiated:  April 17, 2017    Rehab Diagnosis:  General Debilitation Secondary to Hematoma    Pertinent Physical/Psychiatric History:     Patient Active Problem List   Diagnosis Code    Nonischemic cardiomyopathy ([facility] I42.8    History of complete heart block Z86.79    Biventricular implantable cardioverter-defibrillator in situ Z95.810    Left bundle branch block (LBBB) on electrocardiogram I44.7    Type 2 diabetes mellitus with diabetic neuropathy ([facility]) E11.40    Dyslipidemia E78.5    Diabetic neuropathy associated with type 2 diabetes mellitus ([facility] E11.40    Obstructive sleep apnea on CPAP G47.33, Z99.89    AICD generator infection ([facility] T82. 7XXA    Difficult airway for intubation T88. 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 1XXA    Generalized weakness R53.1    Impaired mobility and ADLs Z74.09    History of Coumadin therapy Z79.01    Gout M10.9       Patient denies history of psychiatric services and is not Rx psychotropic medication on admit to ARU. She further denies history of substance abuse nor dependency. OBJECTIVE  GENERAL OBSERVATIONS  Willingness to participate in program: [x] good   [] fair [] indifferent [] poor    General Appearance:  Patient observed casually and appropriately dressed and groomed and sitting quietly in wheelchair in dining room on ARU, initially with spouse and then joined by their daughter.   She seems stressed by various (medical) concerns but is not in acute distress and no lability is observed. Sensory Impairments:  Patient has satisfactory auditory reception and comprehension and responds to inquiry with intelligibility. Taoist Affiliation:  Buddhist    Admission Assessment  Discharge Status   [x] alert  [] lethargic  [] difficult to arouse  [] fluctuating  [] other: Level of Consciousness [] alert  [] lethargic  [] difficult to arouse  [] fluctuating  [] other:   [x] person  [x] place  [x] time  [x] situation Oriented [] person  [] place  [] time  [] situation   [x] within normal limits  [x] impaired       [x] mild        [] moderate        [] severe Attention [] within normal limits  [] impaired       [] mild        [] moderate        [] severe   [x] within normal limits  [] impaired       [] mild        [] moderate        [] severe Memory [] within normal limits  [] impaired       [] mild        [] moderate        [] severe   [] appropriate to situation  [] depressed  [x] anxious  [] angry   [x] fearful  [] emotionally labile  [] other:  Mood [] appropriate to situation  [] depressed  [] anxious  [] angry   [] fearful  [] emotionally labile  [] other:   [x] appropriate  [] flat  [] inappropriate to content of speech Affect [] appropriate  [] flat  [] inappropriate to content of speech   [x] appropriate  [] aggressive/agitated  [] withdrawn  [] inappropriate  [] other: Behavior [] appropriate  [] aggressive/agitated  [] withdrawn  [] inappropriate  [] other:   [] good  [x] limited  [] denial  [] none Insight Into Illness [] good  [] limited  [] denial  [] none   [x] intact  [x] impaired       [x] mild        [] moderate        [] severe       Describe: Patient seems somewhat pensive and preoccupied. Cognition [] intact  [] impaired       [] mild        [] moderate        [] severe       Describe:    [x] coping  [] demonstrates poor adjustment  [] undetermined       As evidenced by:  But, she acknowledges feeling stressed by medical circumstances. Patient Adjustment to Disability [] coping  [] demonstrates poor adjustment  [] undetermined       As evidenced by:    [x] coping  [] demonstrates poor adjustment  [] undetermined      As evidenced by: She appears to have good support from spouse and daughter. Family Adjustment to Disability [] coping  [] demonstrates poor adjustment  [] undetermined      As evidenced by:      ASSESSMENT  Clinical Impression:  Patient is a 77year old, long time retired () and previously receiving Social Security Disability, ,  female who resides with spouse and an adult daughter in one level residence in [place] with three steps to enter. Patient expects to return to home, actually later this week, with both spouse and her daughter present at different times during the day and available to assist her. Patient describes multiple medical concerns for herself, and seems especially fearful of suffering further injury, on top of all that she has already endured. She is therefore, not only encouraged to discuss her immediate medical concerns with MD but also encouraged to focus on day to day goals for herself, and to try and be realistic in her expectation for self in recovery, so as not to feel unnecessarily burdened nor stressed. Emotionally, patient denies history of psychiatric services and is not requiring psychotropic medication for stability on ARU. Yet, she is stressed by immediate events and is worried about her ability to remain stable post hospital.  At time of evaluation, she seems especially worried about ankle swelling and possible medication side effects, including feeling \"woozy\" and possibly constipated from pain medications. In fact, there is a hint of somatic preoccupation and she is therefore encouraged to focus on goals for each day to distract herself from unnecessary worry.   Fortunately, she seems very well supported by her family and hopefully they will encourage her toward \"health\" rather than reinforce preoccupation with worry and regret. Cognitively, she is able to understand and follow direction and appropriately responds to all inquiry. Repetition of treatment information would certainly be useful to her, as necessary. Patient Strengths:  Alert, oriented, pleasant, cooperative and responsive to all discussion. Patient Preferences:  Return to home with greater strength, confidence and overall health stability. Rehab Potential:  Making good progress    Educational Needs: Under each heading list the specific items in which the patient or family will need education/training.  Example: hip precautions, use of walker, ADL equipment, neglect, judgment, adjustment, etc.     Special considerations or accommodations for teaching:  [x] Yes     [] No     [] NA  If Yes, explain: Somatic concerns and apparent anxiety over health stability Discharge Status    Completed Demonstrated/ Verbalized Understanding    Yes No Yes No   Info regarding disability: Limited insight about various medical issues [] [] [] []   Adjustment: Situational distress [] [] [] []   Cognition:  [] [] [] []   Other: Pain management [] [] [] []   Other: Self confidence [] [] [] []   If education not completed, explain: [] [] [] []     PLAN  Problem: Expressed concerns about various medical circumstances  Long Term Goal: Better identification of issues  Intervention: Patient education  At Discharge  LTG Achieved: [] Yes [] No If not achieved, explain:    Problem: Situational stress and distress  Long Term Goal: Mood stability  Intervention: Support  and behavioral redirection  At Discharge  LTG Achieved: [] Yes [] No If not achieved, explain:    Problem: Self confidence  Long Term Goal: Maximize confidence  Intervention: Positive reinforcement  At Discharge  LTG Achieved: [] Yes [] No If not achieved, explain:    Problem: Pain management  Long Term Goal: Effective pain management  Intervention: Behavioral redirection and Rx  At Discharge  LTG Achieved: [] Yes [] No If not achieved, explain:    Problem:   Long Term Goal:   Intervention:   At Discharge  LTG Achieved: [] Yes [] No If not achieved, explain:    Miya Quinones, [facility]  4/17/2017 9:37 PM    DISCHARGE STATUS    Clinical Impressions: Patient transferred to acute care rather than discharge to home secondary to onset of lower extremity paraplegia and numbing.     Follow-up Services Recommended Purpose                 Miya Quinones, PHD  Discharge Date/Time:

## 2017-04-18 NOTE — PROGRESS NOTES
[facility]    Bedside and Verbal shift change report given to Santo Vargas RN  (oncoming nurse) by Leighton Blair RN   (offgoing nurse). Report included the following information SBAR, Kardex, MAR and Recent Results. Situation:   Code Status: Full Code   Reason for Admission: Psoas hematoma  Hospital Day: 12   Problem List:   Hospital Problems  Date Reviewed: 4/18/2017          Codes Class Noted POA    History of Coumadin therapy ICD-10-CM: Z79.01  ICD-9-CM: V58.61  Unknown Yes    Overview Signed 4/6/2017 10:35 PM by Blane Breen MD     Anticoagulation for chronic atrial fibrillation; Discontinued on 3/30/2017             Decreased calculated glomerular filtration rate (GFR) ICD-10-CM: R94.4  ICD-9-CM: 794.4  3/30/2017 Yes    Overview Signed 4/6/2017  5:47 PM by Blane Breen MD     Calculated GFR equivalent to that of CKD stage 3 = 30-59 ml/min             History of pyelonephritis ICD-10-CM: Z87.440  ICD-9-CM: V13.02  3/30/2017 Yes        Iliopsoas muscle hematoma ICD-10-CM: L46.63IJ  ICD-9-CM: 924.00  3/30/2017 Yes        * (Principal)Psoas hematoma, right, secondary to anticoagulant therapy ICD-10-CM: S30. 1XXA  ICD-9-CM: 924.9, E934.2  3/30/2017 Yes        Generalized weakness ICD-10-CM: R53.1  ICD-9-CM: 780.79  3/30/2017 Yes        Impaired mobility and ADLs ICD-10-CM: Z74.09  ICD-9-CM: 799.89  3/30/2017 Yes        Benign hypertensive heart disease with systolic CHF, NYHA class 2 (HCC) (Chronic) ICD-10-CM: I11.0, I50.20  ICD-9-CM: 402.11, 428.20, 428.0  9/5/2012 Yes        Type 2 diabetes mellitus with diabetic neuropathy (HCC) (Chronic) ICD-10-CM: E11.40  ICD-9-CM: 250.60, 357.2  6/28/2011 Yes              Background:   Past Medical History:   Past Medical History:   Diagnosis Date    Benign hypertensive heart disease with systolic CHF, NYHA class 2 ([facility] 9/5/2012    Biventricular implantable cardioverter-defibrillator in situ 04/28/2005    Upgraded to BiV AICD; gen change 4/2008; pocket revision 10/2009; Abdominal - done on 8/22/2012 by Dr. Reyna Weiss Cardiac cath 08/15/1996    Patent coronaries. Elev LVEDP. EF 50-55%.  Cardiac echocardiogram 06/23/2015    Ltd study. EF 45-50%. Mild, diffuse hypk. Severe apical hypk. No mass or thrombus was clearly identified, although imaging was suboptimal.      Cardiac nuclear imaging test 06/19/2015    Fixed distal apical, distal septal defect more likely due to RV pacing than prior infarct. No ischemia. EF 46%. RWMA c/w RV pacing. Nondiagnostic EKG on pharm stress test.      Cardiovascular lower extremity venous duplex 09/04/2012    Acute, non-occlusive DVT in CFV on right. No DVT on left. No superficial thrombosis bilaterally.  Cardiovascular upper extremity venous duplex 08/27/2012    DVT in axillary vein on left. Left subclavian was not visualized.     Chronic anemia 9/5/2012    Chronic systolic heart failure (HCC)     Decreased calculated glomerular filtration rate (GFR) 3/30/2017    Calculated GFR equivalent to that of CKD stage 3 = 30-59 ml/min    Diabetic neuropathy associated with type 2 diabetes mellitus ([address] 6/28/2011    Difficult airway for intubation 08/22/2012    see anesthesia airway note    Dyslipidemia 6/28/2011    Gout     History of complete heart block 6/28/2011    History of Coumadin therapy     Anticoagulation for DVT of the LUE; Discontinued on 3/30/2017    History of deep venous thrombosis 9/5/2012    Left upper extremity    History of pyelonephritis 3/30/2017    Left bundle branch block (LBBB) on electrocardiogram 6/28/2011    Nonischemic cardiomyopathy ([address] 6/28/2011    Obesity (BMI 35.0-39.9 without comorbidity) ([address] 3/13/2017    Obstructive sleep apnea on CPAP 2/7/2012    Psoas hematoma, right, secondary to anticoagulant therapy 3/30/2017    Type 2 diabetes mellitus with diabetic neuropathy ([address] 6/28/2011      Patient taking anticoagulants no    Patient has a defibrillator: yes     Assessment:   Changes in Assessment throughout shift: no     Patient has central line: no Reasons if yes: Insertion date: Last dressing date:   Patient has Krueger Cath: no Reasons if yes: Insertion date:     Last Vitals:     Vitals:    04/17/17 1637 04/17/17 2116 04/18/17 0832 04/18/17 1600   BP: 159/78 168/82 139/71 124/63   Pulse: 82 95 84 74   Resp: 18 19 18 18   Temp: 99.1 °F (37.3 °C) 98.9 °F (37.2 °C) 98.2 °F (36.8 °C) 98.2 °F (36.8 °C)   SpO2: 91% 97% 93% 100%   Weight:       Height:            PAIN    Pain Assessment    Pain Intensity 1: 0 (04/18/17 1600) Pain Intensity 1: 2 (12/29/14 1105)    Pain Location 1: Neck Pain Location 1: Abdomen    Pain Intervention(s) 1: Medication (see MAR) Pain Intervention(s) 1: Medication (see MAR)  Patient Stated Pain Goal: 0 Patient Stated Pain Goal: 0  o Intervention effective: no  o Other actions taken for pain: pain medicine     Skin Assessment  Skin color Skin Color: Appropriate for ethnicity  Condition/Temperature Skin Condition/Temp: Warm  Integrity Skin Integrity: Intact  Turgor    Weekly Pressure Ulcer Documentation Weekly Pressure Ulcer Documentation: No Pressure Ulcer Noted-Pressure Ulcer Prevention Initiated  Wound Prevention & Protection Methods  Orientation of wound Orientation of Wound Prevention: Posterior  Location of Prevention Location of Wound Prevention: Sacrum/Coccyx  Dressing Present Dressing Present : No  Dressing Status    Wound Offloading Wound Offloading (Prevention Methods): Bed, pressure redistribution/air     INTAKE/OUPUT    Date 04/17/17 1900 - 04/18/17 0659 04/18/17 0700 - 04/19/17 0659   Shift 8794-0752 24 Hour Total 4436-1133 4325-7546 24 Hour Total   I  N  T  A  K  E   P.O.  120 930  930      P. O.  120 930  930    Shift Total  (mL/kg)  120  (1.1) 930  (8.8)  930  (8.8)   O  U  T  P  U  T   Urine  (mL/kg/hr)           Urine Occurrence(s) 2 x 5 x 0 x  0 x    Stool           Stool Occurrence(s) 0 x 1 x 0 x  0 x    Shift Total  (mL/kg)        NET  120 930  930   Weight (kg) 105.7 105.7 105.7 105.7 105.7       Recommendations:  1. Patient needs and requests: pain medicine    2. Diet: diabetic    3. Pending tests/procedures: no     4. Functional Level/Equipment: 1 x person assist    5. Estimated Discharge Date: TDB Posted on Whiteboard in Patients Room: [facility] Safety Check    A safety check occurred in the patient's room between off going nurse and oncoming nurse listed above. The safety check included the below items  Area Items   H  High Alert Medications - Verify all high alert medication drips (heparin, PCA, etc.)   E  Equipment - Suction is set up for ALL patients (with vania)  - Red plugs utilized for all equipment (IV pumps, etc.)  - WOWs wiped down at end of shift.  - Room stocked with oxygen, suction, and other unit-specific supplies   A  Alarms - Bed alarm is set for fall risk patients  - Ensure chair alarm is in place and activated if patient is up in a chair   L  Lines - Check IV for any infiltration  - Krueger bag is empty if patient has a Krueger   - Tubing and IV bags are labeled   S  Safety   - Room is clean, patient is clean, and equipment is clean. - Hallways are clear from equipment besides carts. - Fall bracelet on for fall risk patients  - Ensure room is clear and free of clutter  - Suction is set up for ALL patients (with vania)  - Hallways are clear from equipment besides carts.    - Isolation precautions followed, supplies available outside room, sign posted

## 2017-04-18 NOTE — PROGRESS NOTES
[facility]    Bedside and Verbal shift change report given to Marilu Villasenor RN  (oncoming nurse) by Robert Linn RN   (offgoing nurse). Report included the following information SBAR, Kardex, MAR and Recent Results. Situation:   Code Status: Full Code   Reason for Admission: Psoas hematoma  Hospital Day: 12   Problem List:   Hospital Problems  Date Reviewed: 4/16/2017          Codes Class Noted POA    History of Coumadin therapy ICD-10-CM: Z79.01  ICD-9-CM: V58.61  Unknown Yes    Overview Signed 4/6/2017 10:35 PM by Curtis Rivera MD     Anticoagulation for chronic atrial fibrillation; Discontinued on 3/30/2017             Decreased calculated glomerular filtration rate (GFR) ICD-10-CM: R94.4  ICD-9-CM: 794.4  3/30/2017 Yes    Overview Signed 4/6/2017  5:47 PM by Curtis Rivera MD     Calculated GFR equivalent to that of CKD stage 3 = 30-59 ml/min             History of pyelonephritis ICD-10-CM: Z87.440  ICD-9-CM: V13.02  3/30/2017 Yes        Iliopsoas muscle hematoma ICD-10-CM: M67.66QN  ICD-9-CM: 924.00  3/30/2017 Yes        * (Principal)Psoas hematoma, right, secondary to anticoagulant therapy ICD-10-CM: S30. 1XXA  ICD-9-CM: 924.9, E934.2  3/30/2017 Yes        Generalized weakness ICD-10-CM: R53.1  ICD-9-CM: 780.79  3/30/2017 Yes        Impaired mobility and ADLs ICD-10-CM: Z74.09  ICD-9-CM: 799.89  3/30/2017 Yes        Benign hypertensive heart disease with systolic CHF, NYHA class 2 (HCC) (Chronic) ICD-10-CM: I11.0, I50.20  ICD-9-CM: 402.11, 428.20, 428.0  9/5/2012 Yes        Type 2 diabetes mellitus with diabetic neuropathy (HCC) (Chronic) ICD-10-CM: E11.40  ICD-9-CM: 250.60, 357.2  6/28/2011 Yes              Background:   Past Medical History:   Past Medical History:   Diagnosis Date    Benign hypertensive heart disease with systolic CHF, NYHA class 2 ([address] 9/5/2012    Biventricular implantable cardioverter-defibrillator in situ 04/28/2005    Upgraded to BiV AICD; gen change 4/2008; pocket revision 10/2009; Abdominal - done on 8/22/2012 by Dr. Mariano Devi Cardiac cath 08/15/1996    Patent coronaries. Elev LVEDP. EF 50-55%.  Cardiac echocardiogram 06/23/2015    Ltd study. EF 45-50%. Mild, diffuse hypk. Severe apical hypk. No mass or thrombus was clearly identified, although imaging was suboptimal.      Cardiac nuclear imaging test 06/19/2015    Fixed distal apical, distal septal defect more likely due to RV pacing than prior infarct. No ischemia. EF 46%. RWMA c/w RV pacing. Nondiagnostic EKG on pharm stress test.      Cardiovascular lower extremity venous duplex 09/04/2012    Acute, non-occlusive DVT in CFV on right. No DVT on left. No superficial thrombosis bilaterally.  Cardiovascular upper extremity venous duplex 08/27/2012    DVT in axillary vein on left. Left subclavian was not visualized.     Chronic anemia 9/5/2012    Chronic systolic heart failure (HCC)     Decreased calculated glomerular filtration rate (GFR) 3/30/2017    Calculated GFR equivalent to that of CKD stage 3 = 30-59 ml/min    Diabetic neuropathy associated with type 2 diabetes mellitus ([address] 6/28/2011    Difficult airway for intubation 08/22/2012    see anesthesia airway note    Dyslipidemia 6/28/2011    Gout     History of complete heart block 6/28/2011    History of Coumadin therapy     Anticoagulation for DVT of the LUE; Discontinued on 3/30/2017    History of deep venous thrombosis 9/5/2012    Left upper extremity    History of pyelonephritis 3/30/2017    Left bundle branch block (LBBB) on electrocardiogram 6/28/2011    Nonischemic cardiomyopathy ([address] 6/28/2011    Obesity (BMI 35.0-39.9 without comorbidity) ([address] 3/13/2017    Obstructive sleep apnea on CPAP 2/7/2012    Psoas hematoma, right, secondary to anticoagulant therapy 3/30/2017    Type 2 diabetes mellitus with diabetic neuropathy ([address] 6/28/2011      Patient taking anticoagulants no    Patient has a defibrillator: yes     Assessment:   Changes in Assessment throughout shift: no     Patient has central line: no Reasons if yes: Insertion date: Last dressing date:   Patient has Krueger Cath: no Reasons if yes: Insertion date:     Last Vitals:     Vitals:    04/16/17 1930 04/17/17 0944 04/17/17 1637 04/17/17 2116   BP: 138/68 153/79 159/78 168/82   Pulse: 82 88 82 95   Resp: 19 19 18 19   Temp: 100.2 °F (37.9 °C) 99.2 °F (37.3 °C) 99.1 °F (37.3 °C) 98.9 °F (37.2 °C)   SpO2: 99% 97% 91% 97%   Weight:       Height:            PAIN    Pain Assessment    Pain Intensity 1: 0 (04/18/17 0001) Pain Intensity 1: 2 (12/29/14 1105)    Pain Location 1: Back, Hip Pain Location 1: Abdomen    Pain Intervention(s) 1: Medication (see MAR) Pain Intervention(s) 1: Medication (see MAR)  Patient Stated Pain Goal: 0 Patient Stated Pain Goal: 0  o Intervention effective: no  o Other actions taken for pain: pain medicine     Skin Assessment  Skin color Skin Color: Appropriate for ethnicity  Condition/Temperature Skin Condition/Temp: Warm  Integrity Skin Integrity: Intact  Turgor    Weekly Pressure Ulcer Documentation Weekly Pressure Ulcer Documentation: No Pressure Ulcer Noted-Pressure Ulcer Prevention Initiated  Wound Prevention & Protection Methods  Orientation of wound Orientation of Wound Prevention: Posterior  Location of Prevention Location of Wound Prevention: Sacrum/Coccyx  Dressing Present Dressing Present : No  Dressing Status    Wound Offloading Wound Offloading (Prevention Methods): Bed, pressure redistribution/air     INTAKE/OUPUT    Date 04/17/17 0700 - 04/18/17 0659 04/18/17 0700 - 04/19/17 0659   Shift 9308-9417 9769-2592 24 Hour Total 0700-1859 1900-0659 24 Hour Total   I  N  T  A  K  E   P.O. 120  120         P. O. 120  120       Shift Total  (mL/kg) 120  (1.1)  120  (1.1)      O  U  T  P  U  T   Urine  (mL/kg/hr)            Urine Occurrence(s) 3 x 1 x 4 x       Stool            Stool Occurrence(s) 1 x 0 x 1 x Shift Total  (mL/kg)           120      Weight (kg) 105.7 105.7 105.7 105.7 105.7 105.7       Recommendations:  1. Patient needs and requests: pain medicine    2. Diet: diabetic    3. Pending tests/procedures: no     4. Functional Level/Equipment: 1 x person assist    5. Estimated Discharge Date: TDB Posted on Whiteboard in Patients Room: [facility] Safety Check    A safety check occurred in the patient's room between off going nurse and oncoming nurse listed above. The safety check included the below items  Area Items   H  High Alert Medications - Verify all high alert medication drips (heparin, PCA, etc.)   E  Equipment - Suction is set up for ALL patients (with vania)  - Red plugs utilized for all equipment (IV pumps, etc.)  - WOWs wiped down at end of shift.  - Room stocked with oxygen, suction, and other unit-specific supplies   A  Alarms - Bed alarm is set for fall risk patients  - Ensure chair alarm is in place and activated if patient is up in a chair   L  Lines - Check IV for any infiltration  - Krueger bag is empty if patient has a Krueger   - Tubing and IV bags are labeled   S  Safety   - Room is clean, patient is clean, and equipment is clean. - Hallways are clear from equipment besides carts. - Fall bracelet on for fall risk patients  - Ensure room is clear and free of clutter  - Suction is set up for ALL patients (with vania)  - Hallways are clear from equipment besides carts.    - Isolation precautions followed, supplies available outside room, sign posted

## 2017-04-18 NOTE — PROGRESS NOTES
Problem: Self Care Deficits Care Plan (Adult)  Goal: *Therapy Goal (Edit Goal, Insert Text)  STG = Long Term Goals (to be met upon discharge date) in order to increase pts functional independence and safety, and decrease burden of care:  1. Pt will perform grooming with independence. 2. Pt will perform UB bathing with modified independence. 3. Pt will perform LB bathing with modified independence. 4. Pt will perform shower transfer with modified independence. 5. Pt will perform UB dressing with independence. 6. Pt will perform LB dressing with modified independence. 7. Pt will perform toileting task with modified independence. 8. Pt will perform toilet transfer with modified independence. 9. Pt will perform an IADL task while standing with modified independence. OCCUPATIONAL THERAPY DISCHARGE SUMMARY  Patient Name:Debby Gonzalez  Time Spent With Patient  Time In: 0800  Time Out: 0900  Patient Seen For[de-identified] ADLs      Time In: 1000  Time Out: 1030 (-1 unit due to feeling light headed)     Pain at start of tx: pain reported, but no pain score given. Pain at stop of tx: 9/10 after shower and ambulating to sink. Patient identified with name and : yes  Objective: ADL was performed in AM session. During session 2 Pt ambulated from her w/c to the kitchen and stood to collect 2 cones for 2:35 before stated she needed to sit down due to burning pain. Pt increased anterior flexand leaned back on the counter. Pt was cued to stand up and to walk back to her w/c. Pt began to lose her balance requiring CGA to stand up and required her w/c to be moved closer to her. Pt pivoted with the RW and CGA to turn and sit in her w/c. When asked what happened the Pt stated she became light headed and couldn't stand up. Pt also stated she was light headed while sitting. BP was checked and was 142/77.  Pt continued to report she was light headed and needed to rest. Pt was taken back to her room and squat pivoted into the recliner with CGA. Nurse was informed of Pt's condition.       Problem List:    Decreased strength B UE  [X]     Decreased strength trunk/core  [X]     Decreased AROM   [ ]     Decreased PROM  [ ]     Decreased balance sitting  [ ]     Decreased balance standing  [X]     Decreased endurance  [X]     Pain  [X]        Functional Limitations:   Decreased independence with ADL  [X]     Decreased independence with functional transfers  [X]     Decreased independence with ambulation  [X]     Decreased independence with IADL  [X]        Outcome Measures:                  MMT Initial Assessment    Right Upper Extremity  Left Upper Extermity    UE AROM  WFL; Grossly 4/5 WFL; Grossly 4/5   Shoulder flexion       Shoulder extension       Shoulder ABDuction       Shoulder ADDUction       Elbow Flexion       Elbow Extension       Wrist Extension/Flexion                                     MMT Discharge Assessment    Right Upper Extremity  Left Upper Extermity    UE AROM WFL; Grossly 4+/5 WFL; Grossly 4+/5   Shoulder flexion       Shoulder extension       Shoulder ABDuction       Shoulder ADDUction       Elbow Flexion       Elbow Extension       Wrist Extension/Flexion                    0/5       No palpable muscle contraction  1/5       Palpable muscle contraction, no joint movement  2-/5      Less than full range of motion in gravity eliminated position  2/5       Able to complete full range of motion in gravity eliminated position  2+/5     Able to initiate movement against gravity  3-/5      More than half but not full range of motion against gravity  3/5       Able to complete full range of motion against gravity  3+/5     Completes full range of motion against gravity with minimal resistance  4-/5      Completes full range of motion against gravity with minimal-moderate resistance  4/5       Completes full range of motion against gravity with moderate resistance  4+/5     Completes full range of motion against gravity with moderate-maximum resistance  5/5       Completes full range of motion against gravity with maximum resistance     Coordination: present in B UE  Sensation: present in B UE      FIM SCORES Initial Assessment Discharge Assessment   Eating 7 Feeding/Eating  Feeding/Eating Assistance: 6 (Modified independent)  Comments: Pt is able to manage all containers and eat independently. Pt has dentures. Grooming 5 Grooming  Grooming Assistance : 7 (Independent)  Comments: Pt washed her face and brushed her teeth while seated in her w/c at the sink independently. Oral Hygiene FIM: 7    Bathing 3 Upper Body Bathing  Bathing Assistance, Upper: 6 (Modified independent)  Position Performed: Seated in chair  Adaptive Equipment: Shower chair  Comments: Pt performed all UB bathing while seated in the shower indpendently. Lower Body Bathing  Bathing Assistance, Lower : 6 (Modified independent)  Adaptive Equipment: Long handled sponge  Position Performed: Seated in chair  Adaptive Equipment: Long handled sponge; Shower chair  Comments: Pt performed all LB bathing while seated in the shower independently. Pt washed R leg with a long handled sponge and used the leg cross method for the L leg. Pt washed linnette area and buttocks while seated leaning to the L and R. Upper Body Dressing 5 Upper Body Dressing   Dressing Assistance : 6 (Modified independent)  Comments: Pt doffed pullover shirt and donned sportsbra and pullover shirt while seated with increased time independently. Lower Body Dressing 3 Lower Body Dressing   Dressing Assistance : 5 (Supervision)  Position Performed: Seated in chair;Standing  Adaptive Equipment Used: Reacher;Sock aid;Grab bar  Comments: Pt doffed and donned pants and underpants off hip while standing with supervision and doffed and donned over legs while seated indendently with increased time.  Pt used a reacher to doff both socks and to diane pants and underpants over feet with increased time while seated independently. Pt used a sockaid to diane B socks due to increased pain with increased time independently. Pt was unable to diane shoes due to increased swelling in legs and feet. Sock and/or Shoe Management FIM: 6   Toileting 4 Toileting  Toileting Assistance (FIM Score): 5 (supervision)  Adaptive Equipment: Other (comment) (BSC over toilet)   Pt performed clothing management while standing with supervision and hygiene while seated independently. Bladder - level of assist 4 6   Bladder - accident frequency score 6 6   Bowel - level of assist 4 6   Bowel - accident frequency score 6 6   Tub/Shower Transfer 0 Tub or Shower Type: Shower  Amount of Assistance Required: 5 (Supervision/setup)  Adaptive Equipment: Grab bars; Shower chair with back; Walker (comment)  Pt ambulated from toilet with a RW and performed shower transfer with supervision side stepping over a towel threshold using RW and grab bar for balance. Toilet Transfer 4 Functional Transfers  Toilet Transfer : Other (comment) (Pt ambulated from room to [facility] over toilet with a RW)  Amount of Assistance Required: 5 (Supervision/setup)  Pt ambulated from w/c in room to [facility] over toilet with a RW and supervision. Pt required cues to stand up straight and to put her R heel down. Comprehension 6 6   Expression 6 6   Social Interaction 6 6   Problem Solving 5 6   Memory 5 6   Please see [facility] Interdisciplinary Eval: Coordination/Balance Section for details regarding FIM score description. OCCUPATIONAL THERAPY PLAN OF CARE     LTGs: Pt met 3 out of 9 goals. Pt was unable to meet at LTGs due to increased pain and decreased safety when standing and ambulating. Pt would benefit from continued skilled occupational therapy in order to improve self care and functional mobility within the home with use of least restrictive device. Interventions may include ADL training and functional transfer training.       HEP handout:  none     Pt to be discharged on 4/19/2017 with supervision provided by  and daughter. Family Training: Performed 4/17/2017 with  and daugher  Therapy recommendations:   [address]  Equipment recommendations:    shower chair and BSC     Please see IRC; Interdisciplinary Eval, Care Plan, and Patient Education for further information regarding occupational therapy discharge summary and plan of care.       MARKO Coronado  4/18/2017

## 2017-04-19 ENCOUNTER — APPOINTMENT (OUTPATIENT)
Dept: CT IMAGING | Age: 67
DRG: 853 | End: 2017-04-19
Attending: INTERNAL MEDICINE
Payer: COMMERCIAL

## 2017-04-19 LAB
GLUCOSE BLD STRIP.AUTO-MCNC: 126 MG/DL (ref 70–110)
GLUCOSE BLD STRIP.AUTO-MCNC: 243 MG/DL (ref 70–110)
GLUCOSE BLD STRIP.AUTO-MCNC: 260 MG/DL (ref 70–110)

## 2017-04-19 RX ORDER — GABAPENTIN 400 MG/1
400 CAPSULE ORAL 2 TIMES DAILY
Qty: 30 CAP | Refills: 0 | Status: SHIPPED | OUTPATIENT
Start: 2017-04-19 | End: 2017-05-25

## 2017-04-19 RX ORDER — DIPHENHYDRAMINE HYDROCHLORIDE 50 MG/ML
12.5 INJECTION, SOLUTION INTRAMUSCULAR; INTRAVENOUS ONCE
Status: COMPLETED | OUTPATIENT
Start: 2017-04-19 | End: 2017-04-19

## 2017-04-19 RX ORDER — LORAZEPAM 2 MG/ML
2 INJECTION INTRAMUSCULAR ONCE
Status: COMPLETED | OUTPATIENT
Start: 2017-04-19 | End: 2017-04-19

## 2017-04-19 RX ORDER — HYDROMORPHONE HYDROCHLORIDE 1 MG/ML
1 INJECTION, SOLUTION INTRAMUSCULAR; INTRAVENOUS; SUBCUTANEOUS ONCE
Status: COMPLETED | OUTPATIENT
Start: 2017-04-19 | End: 2017-04-19

## 2017-04-19 RX ORDER — MELATONIN
2000 DAILY
Qty: 30 TAB | Refills: 0 | Status: SHIPPED | OUTPATIENT
Start: 2017-04-19 | End: 2018-07-19

## 2017-04-19 RX ADMIN — CARVEDILOL 6.25 MG: 3.12 TABLET, FILM COATED ORAL at 18:38

## 2017-04-19 RX ADMIN — STANDARDIZED SENNA CONCENTRATE AND DOCUSATE SODIUM 2 TABLET: 8.6; 5 TABLET, FILM COATED ORAL at 18:37

## 2017-04-19 RX ADMIN — MODAFINIL 100 MG: 100 TABLET ORAL at 09:28

## 2017-04-19 RX ADMIN — DIPHENHYDRAMINE HYDROCHLORIDE 12.5 MG: 50 INJECTION INTRAMUSCULAR; INTRAVENOUS at 15:22

## 2017-04-19 RX ADMIN — HYDROMORPHONE HYDROCHLORIDE 1 MG: 1 INJECTION, SOLUTION INTRAMUSCULAR; INTRAVENOUS; SUBCUTANEOUS at 15:23

## 2017-04-19 RX ADMIN — PRAVASTATIN SODIUM 40 MG: 20 TABLET ORAL at 00:59

## 2017-04-19 RX ADMIN — MELATONIN TAB 3 MG 3 MG: 3 TAB at 20:46

## 2017-04-19 RX ADMIN — OXYCODONE HYDROCHLORIDE AND ACETAMINOPHEN 1 TABLET: 7.5; 325 TABLET ORAL at 04:01

## 2017-04-19 RX ADMIN — FOLIC ACID 1 MG: 1 TABLET ORAL at 09:28

## 2017-04-19 RX ADMIN — OXYCODONE HYDROCHLORIDE AND ACETAMINOPHEN 1 TABLET: 7.5; 325 TABLET ORAL at 18:38

## 2017-04-19 RX ADMIN — GABAPENTIN 400 MG: 400 CAPSULE ORAL at 18:37

## 2017-04-19 RX ADMIN — ALLOPURINOL 100 MG: 100 TABLET ORAL at 09:28

## 2017-04-19 RX ADMIN — LACTOBACILLUS TAB 2 TABLET: TAB at 09:28

## 2017-04-19 RX ADMIN — OXYCODONE HYDROCHLORIDE AND ACETAMINOPHEN 1 TABLET: 7.5; 325 TABLET ORAL at 13:28

## 2017-04-19 RX ADMIN — PRAVASTATIN SODIUM 40 MG: 20 TABLET ORAL at 21:40

## 2017-04-19 RX ADMIN — OXYCODONE HYDROCHLORIDE AND ACETAMINOPHEN 1 TABLET: 7.5; 325 TABLET ORAL at 09:27

## 2017-04-19 RX ADMIN — SITAGLIPTIN 50 MG: 50 TABLET, FILM COATED ORAL at 09:28

## 2017-04-19 RX ADMIN — VITAMIN D, TAB 1000IU (100/BT) 2000 UNITS: 25 TAB at 09:27

## 2017-04-19 RX ADMIN — LACTOBACILLUS TAB 2 TABLET: TAB at 18:37

## 2017-04-19 RX ADMIN — BUMETANIDE 1 MG: 1 TABLET ORAL at 09:27

## 2017-04-19 RX ADMIN — DOCUSATE SODIUM 100 MG: 100 CAPSULE, LIQUID FILLED ORAL at 09:27

## 2017-04-19 RX ADMIN — INSULIN LISPRO 4 UNITS: 100 INJECTION, SOLUTION INTRAVENOUS; SUBCUTANEOUS at 18:37

## 2017-04-19 RX ADMIN — BUMETANIDE 1 MG: 1 TABLET ORAL at 18:37

## 2017-04-19 RX ADMIN — LORAZEPAM 2 MG: 2 INJECTION, SOLUTION INTRAMUSCULAR; INTRAVENOUS at 15:23

## 2017-04-19 RX ADMIN — INSULIN GLARGINE 15 UNITS: 100 INJECTION, SOLUTION SUBCUTANEOUS at 20:46

## 2017-04-19 RX ADMIN — NALOXEGOL OXALATE 25 MG: 25 TABLET, FILM COATED ORAL at 06:32

## 2017-04-19 RX ADMIN — GABAPENTIN 400 MG: 400 CAPSULE ORAL at 09:27

## 2017-04-19 NOTE — PROGRESS NOTES
Updated clinical notes were sent to Caterina Tafoya via fax with Fran Burrell for authorization for continued stay. I called her and explained that the plan was for DC today from our unit but she developed increased weakness and numbness in her Rt leg. Dr Stefania Gonzalez ordered imaging to see if the hematoma had worsened and was causing these new onset of symptoms. He also put consults in for neurology and neurosurgery. At that point he cancelled the discharge.

## 2017-04-19 NOTE — DISCHARGE INSTRUCTIONS
DISCHARGE SUMMARY from Nurse    The following personal items are in your possession at time of discharge:    Dental Appliances: None  Visual Aid: None     Home Medications: None  Jewelry: Bracelet, Earrings  Clothing: Footwear, Pants, Shirt  Other Valuables: Cell Phone  Personal Items Sent to Safe: none          PATIENT INSTRUCTIONS:    After general anesthesia or intravenous sedation, for 24 hours or while taking prescription Narcotics:  · Limit your activities  · Do not drive and operate hazardous machinery  · Do not make important personal or business decisions  · Do  not drink alcoholic beverages  · If you have not urinated within 8 hours after discharge, please contact your surgeon on call. Report the following to your surgeon:  · Excessive pain, swelling, redness or odor of or around the surgical area  · Temperature over 100.5  · Nausea and vomiting lasting longer than 4 hours or if unable to take medications  · Any signs of decreased circulation or nerve impairment to extremity: change in color, persistent  numbness, tingling, coldness or increase pain  · Any questions        What to do at Home:  Recommended activity: Activity as tolerated, or as directed by your physcian. If you experience any of the following symptoms chest pain or shortness of breath , please follow up with the emergency dept. *  Please give a list of your current medications to your Primary Care Provider. *  Please update this list whenever your medications are discontinued, doses are      changed, or new medications (including over-the-counter products) are added. *  Please carry medication information at all times in case of emergency situations. These are general instructions for a healthy lifestyle:    No smoking/ No tobacco products/ Avoid exposure to second hand smoke    Surgeon General's Warning:  Quitting smoking now greatly reduces serious risk to your health.     Obesity, smoking, and sedentary lifestyle greatly increases your risk for illness    A healthy diet, regular physical exercise & weight monitoring are important for maintaining a healthy lifestyle    You may be retaining fluid if you have a history of heart failure or if you experience any of the following symptoms:  Weight gain of 3 pounds or more overnight or 5 pounds in a week, increased swelling in our hands or feet or shortness of breath while lying flat in bed. Please call your doctor as soon as you notice any of these symptoms; do not wait until your next office visit. Recognize signs and symptoms of STROKE:    F-face looks uneven    A-arms unable to move or move unevenly    S-speech slurred or non-existent    T-time-call 911 as soon as signs and symptoms begin-DO NOT go       Back to bed or wait to see if you get better-TIME IS BRAIN. Warning Signs of HEART ATTACK     Call 911 if you have these symptoms:   Chest discomfort. Most heart attacks involve discomfort in the center of the chest that lasts more than a few minutes, or that goes away and comes back. It can feel like uncomfortable pressure, squeezing, fullness, or pain.  Discomfort in other areas of the upper body. Symptoms can include pain or discomfort in one or both arms, the back, neck, jaw, or stomach.  Shortness of breath with or without chest discomfort.  Other signs may include breaking out in a cold sweat, nausea, or lightheadedness. Don't wait more than five minutes to call 911 - MINUTES MATTER! Fast action can save your life. Calling 911 is almost always the fastest way to get lifesaving treatment. Emergency Medical Services staff can begin treatment when they arrive -- up to an hour sooner than if someone gets to the hospital by car. The discharge information has been reviewed with the patient. The patient verbalized understanding.     Discharge medications reviewed with the patient and appropriate educational materials and side effects teaching were provided. Patient armband removed and shredded      MyChart Activation    Thank you for requesting access to [employer]. Please follow the instructions below to securely access and download your online medical record. [employer] allows you to send messages to your doctor, view your test results, renew your prescriptions, schedule appointments, and more. How Do I Sign Up? 1. In your internet browser, go to [email or web address]  2. Click on the First Time User? Click Here link in the Sign In box. You will be redirect to the New Member Sign Up page. 3. Enter your [employer] Access Code exactly as it appears below. You will not need to use this code after youve completed the sign-up process. If you do not sign up before the expiration date, you must request a new code. [employer] Access Code: [email or web address]  Expires: 2017  6:05 PM (This is the date your [employer] access code will )    4. Enter the last four digits of your Social Security Number (xxxx) and Date of Birth (mm/dd/yyyy) as indicated and click Submit. You will be taken to the next sign-up page. 5. Create a [employer] ID. This will be your [employer] login ID and cannot be changed, so think of one that is secure and easy to remember. 6. Create a [employer] password. You can change your password at any time. 7. Enter your Password Reset Question and Answer. This can be used at a later time if you forget your password. 8. Enter your e-mail address. You will receive e-mail notification when new information is available in [address]. 9. Click Sign Up. You can now view and download portions of your medical record. 10. Click the Download Summary menu link to download a portable copy of your medical information. Additional Information    If you have questions, please visit the Frequently Asked Questions section of the [employer] website at [email or web address] Remember, [employer] is NOT to be used for urgent needs.  For medical emergencies, dial 911.                  ------------------------------------------------------------------------------------------------------------    DISCHARGE INSTRUCTIONS    1. Make sure that when you request refills at the pharmacy that the refill requests are sent to your PCP (NOT to the prescriber at the [facility]) to avoid any delays in getting your medication refills. The physician at the [facility] [address] will not able to order medications or refills after discharge -- Please understand that though we would like to help, it is simply not safe for our physician to order you a medication that we cannot monitor. 2. If any of the prescribed medications require a prior authorization, contact your Primary Care Physician or specialist to EITHER complete prior authorizations and paperwork on your behalf OR prescribe an alternative medication. -- Please understand that though we would like to help, it is simply not safe for our physician to order you a medication that we cannot monitor. 3. Over-the-counter (OTC) medications will NOT be prescribed. You need to buy these medications over-the-counter.     -------------------------------------------------------------------------------------------------------------------

## 2017-04-19 NOTE — REHAB NOTE
SHIFT CHANGE NOTE FOR [facility]    Bedside and Verbal shift change report given to Rosas Duarte RN  (oncoming nurse) by Tacho Phan RN   (offgoing nurse). Report included the following information SBAR, Kardex, MAR and Recent Results. Situation:   Code Status: Full Code   Reason for Admission: Psoas hematoma  Hospital Day: 13   Problem List:   Hospital Problems  Date Reviewed: 4/19/2017          Codes Class Noted POA    History of Coumadin therapy ICD-10-CM: Z79.01  ICD-9-CM: V58.61  Unknown Yes    Overview Signed 4/6/2017 10:35 PM by Frank Thayer MD     Anticoagulation for chronic atrial fibrillation; Discontinued on 3/30/2017             Decreased calculated glomerular filtration rate (GFR) ICD-10-CM: R94.4  ICD-9-CM: 794.4  3/30/2017 Yes    Overview Signed 4/6/2017  5:47 PM by Frank Thayer MD     Calculated GFR equivalent to that of CKD stage 3 = 30-59 ml/min             History of pyelonephritis ICD-10-CM: Z87.440  ICD-9-CM: V13.02  3/30/2017 Yes        Iliopsoas muscle hematoma ICD-10-CM: Z34.87PM  ICD-9-CM: 924.00  3/30/2017 Yes        * (Principal)Psoas hematoma, right, secondary to anticoagulant therapy ICD-10-CM: S30. 1XXA  ICD-9-CM: 924.9, E934.2  3/30/2017 Yes        Generalized weakness ICD-10-CM: R53.1  ICD-9-CM: 780.79  3/30/2017 Yes        Impaired mobility and ADLs ICD-10-CM: Z74.09  ICD-9-CM: 799.89  3/30/2017 Yes        Benign hypertensive heart disease with systolic CHF, NYHA class 2 (HCC) (Chronic) ICD-10-CM: I11.0, I50.20  ICD-9-CM: 402.11, 428.20, 428.0  9/5/2012 Yes        Type 2 diabetes mellitus with diabetic neuropathy (HCC) (Chronic) ICD-10-CM: E11.40  ICD-9-CM: 250.60, 357.2  6/28/2011 Yes              Background:   Past Medical History:   Past Medical History:   Diagnosis Date    Benign hypertensive heart disease with systolic CHF, NYHA class 2 ([facility] 9/5/2012    Biventricular implantable cardioverter-defibrillator in situ 04/28/2005    Upgraded to BiV AICD; gen change 4/2008; pocket revision 10/2009; Abdominal - done on 8/22/2012 by Dr. Radha Mccray Cardiac cath 08/15/1996    Patent coronaries. Elev LVEDP. EF 50-55%.  Cardiac echocardiogram 06/23/2015    Ltd study. EF 45-50%. Mild, diffuse hypk. Severe apical hypk. No mass or thrombus was clearly identified, although imaging was suboptimal.      Cardiac nuclear imaging test 06/19/2015    Fixed distal apical, distal septal defect more likely due to RV pacing than prior infarct. No ischemia. EF 46%. RWMA c/w RV pacing. Nondiagnostic EKG on pharm stress test.      Cardiovascular lower extremity venous duplex 09/04/2012    Acute, non-occlusive DVT in CFV on right. No DVT on left. No superficial thrombosis bilaterally.  Cardiovascular upper extremity venous duplex 08/27/2012    DVT in axillary vein on left. Left subclavian was not visualized.     Chronic anemia 9/5/2012    Chronic systolic heart failure (HCC)     Decreased calculated glomerular filtration rate (GFR) 3/30/2017    Calculated GFR equivalent to that of CKD stage 3 = 30-59 ml/min    Diabetic neuropathy associated with type 2 diabetes mellitus ([address] 6/28/2011    Difficult airway for intubation 08/22/2012    see anesthesia airway note    Dyslipidemia 6/28/2011    Gout     History of complete heart block 6/28/2011    History of Coumadin therapy     Anticoagulation for DVT of the LUE; Discontinued on 3/30/2017    History of deep venous thrombosis 9/5/2012    Left upper extremity    History of pyelonephritis 3/30/2017    Left bundle branch block (LBBB) on electrocardiogram 6/28/2011    Nonischemic cardiomyopathy ([address] 6/28/2011    Obesity (BMI 35.0-39.9 without comorbidity) ([address] 3/13/2017    Obstructive sleep apnea on CPAP 2/7/2012    Psoas hematoma, right, secondary to anticoagulant therapy 3/30/2017    Type 2 diabetes mellitus with diabetic neuropathy ([address] 6/28/2011      Patient taking anticoagulants no    Patient has a defibrillator: yes     Assessment:   Changes in Assessment throughout shift: no     Patient has central line: no Reasons if yes: Insertion date: Last dressing date:   Patient has Krueger Cath: no Reasons if yes: Insertion date:     Last Vitals:     Vitals:    04/18/17 1600 04/18/17 2059 04/19/17 0717 04/19/17 1609   BP: 124/63 125/74 106/68 122/69   Pulse: 74 78 78 (!) 120   Resp: 18 20 20 20   Temp: 98.2 °F (36.8 °C) 100.3 °F (37.9 °C) 99.2 °F (37.3 °C) 97.8 °F (36.6 °C)   SpO2: 100% 95% 100% 92%   Weight:       Height:            PAIN    Pain Assessment    Pain Intensity 1: 3 (04/19/17 1609) Pain Intensity 1: 2 (12/29/14 1105)    Pain Location 1: Abdomen, Hip Pain Location 1: Abdomen    Pain Intervention(s) 1: Medication (see MAR) Pain Intervention(s) 1: Medication (see MAR)  Patient Stated Pain Goal: 0 Patient Stated Pain Goal: 0  o Intervention effective: no  o Other actions taken for pain: pain medicine     Skin Assessment  Skin color Skin Color: Appropriate for ethnicity  Condition/Temperature Skin Condition/Temp: Warm  Integrity Skin Integrity: Intact  Turgor    Weekly Pressure Ulcer Documentation Weekly Pressure Ulcer Documentation: No Pressure Ulcer Noted-Pressure Ulcer Prevention Initiated  Wound Prevention & Protection Methods  Orientation of wound Orientation of Wound Prevention: Posterior  Location of Prevention Location of Wound Prevention: Sacrum/Coccyx  Dressing Present Dressing Present : No  Dressing Status    Wound Offloading Wound Offloading (Prevention Methods): Bed, pressure redistribution/air     INTAKE/OUPUT    Date 04/18/17 1900 - 04/19/17 0659 04/19/17 0700 - 04/20/17 0659   Shift 0117-4334 24 Hour Total 7902-5996 5941-1435 24 Hour Total   I  N  T  A  K  E   P.O.  930 600  600      P. O.  930 600  600    Shift Total  (mL/kg)  930  (8.8) 600  (5.7)  600  (5.7)   O  U  T  P  U  T   Urine  (mL/kg/hr)           Urine Occurrence(s) 2 x 2 x 1 x  1 x    Stool           Stool Occurrence(s) 0 x 0 x 0 x  0 x    Shift Total  (mL/kg)        NET  930 600  600   Weight (kg) 105.7 105.7 105.7 105.7 105.7       Recommendations:  1. Patient needs and requests: pain medicine    2. Diet: diabetic    3. Pending tests/procedures: no     4. Functional Level/Equipment: 1 x person assist    5. Estimated Discharge Date: TDB Posted on Whiteboard in Patients Room: [facility] Safety Check    A safety check occurred in the patient's room between off going nurse and oncoming nurse listed above. The safety check included the below items  Area Items   H  High Alert Medications - Verify all high alert medication drips (heparin, PCA, etc.)   E  Equipment - Suction is set up for ALL patients (with vania)  - Red plugs utilized for all equipment (IV pumps, etc.)  - WOWs wiped down at end of shift.  - Room stocked with oxygen, suction, and other unit-specific supplies   A  Alarms - Bed alarm is set for fall risk patients  - Ensure chair alarm is in place and activated if patient is up in a chair   L  Lines - Check IV for any infiltration  - Krueger bag is empty if patient has a Krueger   - Tubing and IV bags are labeled   S  Safety   - Room is clean, patient is clean, and equipment is clean. - Hallways are clear from equipment besides carts. - Fall bracelet on for fall risk patients  - Ensure room is clear and free of clutter  - Suction is set up for ALL patients (with vania)  - Hallways are clear from equipment besides carts.    - Isolation precautions followed, supplies available outside room, sign posted

## 2017-04-19 NOTE — DISCHARGE SUMMARY
[address] [facility]  [address] [place] [address], [address]     INPATIENT REHABILITATION  DISCHARGE SUMMARY    Name: Pablito Tyler MRN: [id number]   Age / Sex: 77 y.o. / female CSN: [id number]   YOB: 1950 Length of Stay: 13 days   Admit Date: 4/6/2017 Discharge Date: 4/19/2017       PRIMARY CARE PHYSICIAN: Denver Pour, DO      DISCHARGE DIAGNOSES:    Primary Rehabilitation Diagnosis  1. Impaired Mobility and ADLs  2. Right iliopsoas muscle hematoma, secondary to supratherapeutic INR/anticoagulant therapy  3. Sepsis secondary to possible right iliopsoas/epidural abscess resulting to paraplegia       Comorbidities   Nonischemic cardiomyopathy    History of complete heart block    Biventricular implantable cardioverter-defibrillator in situ    Left bundle branch block (LBBB) on electrocardiogram    Type 2 diabetes mellitus with diabetic neuropathy    Dyslipidemia    Diabetic neuropathy associated with type 2 diabetes mellitus    Obstructive sleep apnea on CPAP    History of AICD generator infection     Difficult airway for intubation    Benign hypertensive heart disease with systolic CHF, NYHA class 2     Decreased calculated glomerular filtration rate (GFR)    Chronic anemia    History of deep venous thrombosis    Anticoagulated on Coumadin    Pacemaker twiddler's syndrome    Chronic systolic heart failure     Obesity (BMI 35.0-39.9 without comorbidity)     History of pyelonephritis    History of Coumadin therapy    Gout       CONSULTS CALLED:   1. Neurology consult (Dr. Dean Flores)   2. Spine Surgery consult (Dr. Odilon Montanez)   3. General Surgery (Dr. Reena Chawla, Sr.)   4. Interventional Radiology (Dr. Judd Dudley/Dr. Sharif Baltazar)  5. Infectious Disease consult (Dr. Samantha Driver)   6.  Hospitalist consult (Dr. Alina Styles)      PROCEDURES DONE:   1. Venous duplex ultrasound of both lower extremities (4/18/2017) showed no evidence of deep venous thrombosis detected in the veins visualized. 2. CT scan of the abdomen and pelvis (4/19/2017) showed the right psoas muscle has increased in size compared with 4/2/2017 CT, now 6 cm diameter compared with 4.5 cm previously. Similar extension to the iliopsoas. Heterogeneous density, presumed psoas muscle hematoma. Increase in size suggests continued or interval hemorrhage. As described on dedicated CT lumbar spine component of the psoas hematoma closely associated with right L2/L3 neural foramen. May be medial displacement of muscle by the trauma. Some component of central canal hemorrhage possible. Large left renal cyst is stable. Improved aeration at the lung bases. Some persistent right lung base atelectasis. 3.CT scan of the lumbar spine (4/19/2017) showed that compared with 4/2/2017 there is been an interval increase in the size of the right iliopsoas muscle. Measured 4.5 cm diameter on the previous CT, currently 5.9 x 6.3 cm. Contents of the psoas muscle difficult to assess with noncontrast only CT. Continued increase in size suggests persistent or recurrent bleeding in the setting of presumed hematoma. Soft tissue density similar to the psoas muscle appears to extend medially into the right L2/L3 foramen. Effacement of perineural fat within the left and right L2/3 and to lesser extent L1/L2 neural foramina. Also effaced dorsal epidural fat at these levels. There is no vertebral body fracture identified. The loss of fat planes in the L1/2 and L2/3 foramina and dorsal epidural space raises the possibility of a component of central canal (epidural) hemorrhage.  Unless the permanent pacing device is MR compatible, MRI not option for evaluating the central canal. Suggest CT lumbar spine with IV contrast to attempt to separate tissue planes, perhaps better evaluate central spinal canal. There are degenerative changes in the lower lumbar spine with disc bulging, but does not appear to be a critical central canal or foraminal stenosis at these levels. 4. Chest x-ray (4/20/2017) showed hypoinflation exaggerates appearance of the cardiac silhouette and  bronchovascular markings; mild pulmonary vascular congestion is not excluded. 5. CT scan of the thoracolumbar spine (4/20/2017)       BRIEF HISTORY: The patient is a 78-year-old Black female with multiple medical comorbidities who was admitted to [facility] on 3/30/2017 due to left hip pain. The patient was apparently well until two weeks prior to admission, the patient had persistent right flank pain. The patient was brought to the [facility] Emergency Department for further evaluation. The patient was seen and examined by Dr. Christie Lr. The right flank pain was attributed to the recently diagnosed pyelonephritis. The patient was discharged to home with Ciprofloxacin and Percocet. The pain had improved but 2 days prior to admission, the patient fell and she landed on her right hip. Due to the persistence of the right hip pain, the patient was brought back to the [facility] Emergency Department for further evaluation. WBC count was 19.0. CT scan of the abdomen and pelvis (3/30/2017) showed an asymmetrically enlarged right iliopsoas muscle compared to the left extending to the right groin region with evidence of intramuscular hematoma in different stage; this is new compared to the recent CT 3/16/17; mild fatty stranding noted in the right pericolonic gutter and the right groin; no additional hematoma or other significant injury seen. The patient was admitted under the service of the [facility] (Dr. Ping Sawyer) with an admitting impression of SIRS with iliopsoas muscle hematoma. Coumadin was put on hold. Patient was given Phytonadione SC and 2 units FFP. Urine culture done 3/14/2017 yielded growth of >100,000 colonies/ml of Enterobacter aerogenes. Patient had completed a treatment course of oral Ciprofloxacin prescribed by Dr. Sue Hernandez. Urinalysis done 3/30/2017 showed nitrites negative, leukocyte esterase negative, WBC 0 to 3, bacteria few. Aztreonam was given for treatment of possible partially treated pyelonephritis. Cardiology consult (Dr. Mayito Eldridge) was called for evaluation and comanagement. Interventional Radiology consult (Dr. Aracely Ramey) was called for possible drainage of the right iliopsoas abscess. Dr. Tony Uriostegui stated he didn't believe the patient would not benefit from draining unless there are signs of superinfection. Urine culture yielded no growth after 2 days. Aztreonam was given for 5 days. Urology consult (Dr. Zelda Max) was called for evaluation and comanagement. Patient was noted to have urinary retention so an indwelling perez catheter was placed. CT scan of the abdomen and pelvis (4/02/2017) showed redemonstration of prominent hematoma within the right psoas muscle, essentially unchanged as compared to previous study on 3/13/2017; around right psoas muscle there are are some hazy densities, indicating mild reactive or congestive changes similar to previous study; otherwise, there is no definable confluent inflammatory phlegmon around right psoas muscle; there is no evidence of hematoma in left psoas muscle or in rest of the retroperitoneum. Cardiology (Dr. Mayito Eldridge) recommended to NOT restart anticoagulation. The patient had remained hemodynamically stable but due to the above events, the patient was noted to be generally weak and with impaired mobility and ADLs. Patient was felt to be a good candidate for acute inpatient rehabilitation. Upon evaluation by Physical Therapy and Occupational Therapy, the patient was recommended for acute inpatient rehabilitation.  The patient was discharged and was subsequently admitted to the [facility] for intensive rehabilitation to help recover strength, function and mobility. COURSE IN THE HOSPITAL: Upon admission to the [facility], the patient underwent physical therapy, occupational therapy and speech therapy. The patient was able to actively participate in the rehabilitation activities and progressed well. On discharge, the patient was able to perform the following activities:    1. Occupational Therapy    ON ADMISSION ON DISCHARGE   Eating  Functional Level: 7   Eating  Functional Level: 6     Grooming  Functional Level: 5   Grooming  Functional Level: 7     Bathing  Functional Level: 3   Bathing  Functional Level: 6     Upper Body Dressing  Functional Level: 5   Upper Body Dressing  Functional Level: 6     Lower Body Dressing  Functional Level: 3   Lower Body Dressing  Functional Level: 5     Toileting  Functional Level: 4   Toileting  Functional Level: 5     Toilet Transfers  Toilet Transfer Score: 4   Toilet Transfers  Toilet Transfer Score: 5     Tub /Shower Transfers  Tub/Shower Transfer Score: 0   Tub/Shower Transfers  Tub/Shower Transfer Score: 5       2.  Physical Therapy    ON ADMISSION ON DISCHARGE   Wheelchair Mobility/Management  Able to Propel (ft): 155 feet (B UE technique with SPV verbal cues for technique)  Functional Level: 5  Curbs/Ramps Assist Required (FIM Score): 0 (Not tested)  Wheelchair Setup Assist Required : 3 (Moderate assistance)  Wheelchair Management: Manages left brake, Manages right brake (w/ extender, verbal and visual cues + min a x placement) Wheelchair Mobility/Management  Able to Propel (ft): 250 feet  Functional Level: 5  Curbs/Ramps Assist Required (FIM Score): 0 (Not tested)  Wheelchair Setup Assist Required : 4 (Minimal assistance)  Wheelchair Management: Manages left brake, Manages right brake, Manages left footrest, Manages right footrest     Gait  Amount of Assistance: 0 (Not tested) (Pt unable at this time)  Distance (ft): 0 Feet (ft)  Assistive Device:  (NA) Gait  Amount of Assistance: 4 (Minimal assistance)  Distance (ft): 12 Feet (ft) (x 2 trials)  Assistive Device: Gait belt, Walker, rolling     Balance-Sitting/Standing  Sitting - Static: Poor (constant support)  Sitting - Dynamic: Poor (constant support)  Standing - Static: Poor  Standing - Dynamic : Impaired Balance-Sitting/Standing  Sitting - Static: Good (unsupported)  Sitting - Dynamic: Good (unsupported)  Standing - Static: Fair  Standing - Dynamic : Impaired     Bed/Mat Mobility  Rolling Right : 1 (Total assistance) (unable to assess 2/2 inability to lay supine)  Rolling Left : 1 (Total assistance) (unable to assess 2/2 inability to lay supine)  Supine to Sit : 1 (Total assistance) (unable to assess 2/2 inability to lay supine)  Sit to Supine : 1 (Total assistance) (unable to assess 2/2 inability to lay supine) Bed/Mat Mobility  Rolling Right : 5 (Supervision)  Rolling Left : 5 (Supervision)  Supine to Sit : 5 (Supervision)  Sit to Supine : 5 (Supervision)     Transfers  Transfer Type: Other  Other: Patient performs stand step transfer with anterior aproach, B UE support, weight shift cues, step placement cues and controlled lowering. Patient with significant antalgia with R LE loading and forward flexed posturing requiring assist to achieve correct LE placement. Transfer Assistance : 4 (Minimal assistance)  Sit to Stand Assistance: Minimal assistance  Car Transfers: Not tested  Car Type: NA Transfers  Transfer Type:  Other  Other: Patient performs stand step transfer with cami x 1  for postural assist and R LE advancement assist required  Transfer Assistance : 4 (Minimal assistance)  Sit to Stand Assistance: Contact guard assistance  Car Transfers: Not tested  Car Type: NA     Steps or Stairs  Steps/Stairs Ambulated (#): 0 (Pt unable)  Level of Assist : 0 (Not tested)  Rail Use:  (NA) Steps or Stairs  Steps/Stairs Ambulated (#): 3  Level of Assist : 2 (Maximal assistance) (provided 2/2 anxiety and support 2/2 R knee \"buckling\" repor)  Rail Use: Both       3. Speech and Language Pathology    ON ADMISSION ON DISCHARGE   Comprehension (Native Language)  Primary Mode of Comprehension: Auditory  Score: 6 Comprehension (Native Language)  Primary Mode of Comprehension: Auditory  Score: 6     Expression (Native Language)  Primary Mode of Expression: Verbal  Score: 6   Expression (Native Language)  Primary Mode of Expression: Verbal  Score: 6     Social Interaction/Pragmatics  Score: 6 Social Interaction/Pragmatics  Score: 6     Problem Solving  Score: 5   Problem Solving  Score: 5     Memory  Score: 5  Comments: 3 Memory  Score: 5  Comments: 3       Legend:   7 - Independent   6 - Modified Independent   5 - Standby Assistance / Supervision / Set-up   4 - Minimum Assistance / Contact Guard Assistance   3 - Moderate Assistance   2 - Maximum Assistance   1 - Total Assistance / Dependent       ACUTE MEDICAL ISSUES ADDRESSED IN INPATIENT REHABILITATION FACILITY:     > Right iliopsoas muscle hematoma, secondary to supratherapeutic INR/anticoagulant therapy   > CT scan of the abdomen and pelvis (3/30/2017) showed an asymmetrically enlarged right iliopsoas muscle compared to the left extending to the right groin region with evidence of intramuscular hematoma in different stage; this is new compared to the recent CT 3/16/17; mild fatty stranding noted in the right pericolonic gutter and the right groin; no additional hematoma or other significant injury seen.    > CT scan of the abdomen and pelvis (4/02/2017) showed redemonstration of prominent hematoma within the right psoas muscle, essentially unchanged as compared to previous study on 3/13/2017; around right psoas muscle there are are some hazy densities, indicating mild reactive or congestive changes similar to previous study; otherwise, there is no definable confluent inflammatory phlegmon around right psoas muscle; there is no evidence of hematoma in left psoas muscle or in rest of the retroperitoneum.    > Coumadin had been discontinued on 3/30/2017   > INR (4/6/2017) = 1.3   > Hgb/Hct (4/7/2017) = 8.8/26.5    > Hgb/Hct (4/10/2017) = 9.0/27.2    > Hgb/Hct (4/13/2017) = 8.7/26.8    > Hgb/Hct (4/17/2017) = 9.3/28.3    > Hgb/Hct (4/20/2017) = 9.0/26.4, 9.0/26.2   > INR (4/20/2017) = 1.4      > Benign hypertensive heart disease with chronic systolic heart failure   > On 4/10/2017, decreased Carvedilol from 12.5 mg to 6.25 mg PO BID with meals (8AM, 5PM)   > Continue Carvedilol 6.25 mg PO BID with meals (8AM, 5PM)      > History of pyelonephritis   > Patient had completed a 5-day treatment course of Aztreonam during his stay at [facility]    > On 4/7/2017, patient was started on Floranex 2 tabs PO BID    > Continue Floranex 2 tabs PO BID      > Type 2 diabetes mellitus with diabetic neuropathy   > On 4/8/2017, decreased Lantus from 24 units SC BID to 25 units SC q HS   > On 4/12/2017, decreased Lantus from 25 units to 20 units SC q HS   > On 4/13/2017, resumed Sitagliptin 50 mg PO once daily   > On 4/15/2017, decreased Lantus from 20 units to 15 units SC q HS   > Continue:    > Sitagliptin 50 mg PO once daily    > Lantus 15 units SC q HS    > Humalog insulin sliding scale SC TID AC only      > Gout / Hyperuricemia    > Prior to admission to [facility], the patient was on Allopurinol 100 mg PO BID   > Uric acid (4/7/2017) = 3.8   > On 4/7/2017, decreased Allopurinol from 100 mg PO BID to 100 mg PO once daily   > Continue Allopurinol 100 mg PO once daily      > Constipation   > On 4/10/2017, discontinued Polyethylene glycol 17 grams in 8 oz water PO once daily q 7PM    > On 4/13/2017, added Naloxegol 25 mg PO once daily before breakfast   > On 4/19/2017, Naloxegol was discontinued   > Continue:    > Docusate sodium 100 mg PO once daily after breakfast    > Pericolace 2 tabs PO once daily after dinner     > Neurocognitive modulation    > On 4/14/2017, patient was started on:    > Modafinil 100 mg PO q AM    > Melatonin 3 mg PO q HS   > Continue:    > Modafinil 100 mg PO q AM    > Melatonin 3 mg PO q HS     > Swelling of both lower extremities    > Venous duplex ultrasound of both lower extremities (4/18/2017) showed no evidence of deep venous thrombosis detected in the veins visualized.     > Weakness / Numbness of bilateral lower extremities, secondary to possible right iliopsoas/epidural abscess    > Patient was scheduled for discharge to home on 4/19/2017 but due to significant functional decline noted when trying to transfer to car, discharge was cancelled. Work-up was initiated and will consult Neurology and Neurosurgery   > Rule out lumbar radiculopathy vs sciatic/femoral nerve irritation/compression due to right iliopsoas hematoma   > CT scan of the abdomen and pelvis (4/19/2017) showed the right psoas muscle has increased in size compared with 4/2/2017 CT, now 6 cm diameter compared with 4.5 cm previously. Similar extension to the iliopsoas. Heterogeneous density, presumed psoas muscle hematoma. Increase in size suggests continued or interval hemorrhage. As described on dedicated CT lumbar spine component of the psoas hematoma closely associated with right L2/L3 neural foramen. May be medial displacement of muscle by the trauma. Some component of central canal hemorrhage possible. Large left renal cyst is stable. Improved aeration at the lung bases. Some persistent right lung base atelectasis. > CT scan of the lumbar spine (4/19/2017) showed that compared with 4/2/2017 there is been an interval increase in the size of the right iliopsoas muscle. Measured 4.5 cm diameter on the previous CT, currently 5.9 x 6.3 cm. Contents of the psoas muscle difficult to assess with noncontrast only CT. Continued increase in size suggests persistent or recurrent bleeding in the setting of presumed hematoma.  Soft tissue density similar to the psoas muscle appears to extend medially into the right L2/L3 foramen. Effacement of perineural fat within the left and right L2/3 and to lesser extent L1/L2 neural foramina. Also effaced dorsal epidural fat at these levels. There is no vertebral body fracture identified. The loss of fat planes in the L1/2 and L2/3 foramina and dorsal epidural space raises the possibility of a component of central canal (epidural) hemorrhage. Unless the permanent pacing device is MR compatible, MRI not option for evaluating the central canal. Suggest CT lumbar spine with IV contrast to attempt to separate tissue planes, perhaps better evaluate central spinal canal. There are degenerative changes in the lower lumbar spine with disc bulging, but does not appear to be a critical central canal or foraminal stenosis at these levels.   > On 4/19/2017, called:    > Neurology consult (Dr. Doni Alvarado) called for evaluation and comanagement    > Spine surgery consult (Dr. Akua Mar) called for evaluation and comanagement    > Spoke with General Surgery (Dr. Niki Villagran, Sr.) regarding possible drainage of the right iliopsoas hematoma and he recommended to call Interventional Radiology for IR-guided drainage    > Spoke with Interventional Radiology (Dr. Pa Hassan) regarding possible drainage of the right iliopsoas hematoma and he said there is no way to tell if the increase in diameter in the right iliopsoas muscle was recent or old compared to the 4/2/2017 CT scan. He recommended to check Pt/INR and to monitor Hgb/Hct but no urgent drainage needed.  He recommended to call a consult to Interventional Radiology again in the AM so that someone can review the images as he is not in front of the computer.   > (+) tachycardia since yesterday PM (4/19/2017, 4:09 PM)   > (+) fever this AM (T 101.9 F) at 7:48 AM   > Hgb/Hct (4/20/2017) = 9.0/26.4   > BUN/Creatinine (4/20/2017) = 23/1.90    > Patient was seen and examined by Dr. Akua Mar this AM    > Assessment/Plan:     > Patient with psoas hematoma secondary to [facility]. Has associated pain, weakness and numbness. > INR corrected. Hematoma larger than intial study, patient with continued symptoms.         > Interventional radiology appropriately did not wish to drain initially. I believe that now with corrected coags, that it is reasonable to place drain to resolve mass effect. This may improve patients symptoms over time. Patient will require ongoing PT.         > Otherwise it will resolve itself naturally.          > Form an orthopedic/ spine perspective - I do not have anything to offer the patient with regards to intervention.    > Blood cultures ordered for work-up of fever   > Rapid Response Team was called this AM due to persistent fever/tachycardia and MEWS score of 4    > Urinalysis: WNL    > Chest x-ray: WNL    > Troponin-I = 0.69 (most likely due to demand ischemia on myocardium)   > Further work-ups ordered    > Lactic acid = 1.2    > WBC count = 21.2   > Indwelling perez catheter was placed due to urinary retention   > Interventional Radiology (Dr. Rosalio Dye) was reconsulted and upon review of the images, he felt the size of the hematoma is smaller and he recommended no drainage for now and observation   > Infectious Disease consult (Dr. Jose Dasilva) called for evaluation and comanagement   > Will defer antibiotics to Infectious Disease   > Neurology (Dr. Nuria Yu) had seen and examined the patient and he reported paraplegia on both lower extremities    > Ordered CT scan of the thoracolumbar spine with contrast   > Hospitalist consult (Dr. Yoli Anne) called for transfer of patient to a higher level of care (inpatient at [facility])    > Acute renal failure    > BUN/Creatinine (4/7/2017, on admission) = 19/1.22   > BUN/Creatinine (4/10/2017) = 11/1.10   > BUN/Creatinine (4/17/2017) = 10/1.01   > BUN/Creatinine (4/20/2017) = 23/1.90    > On 4/20/2017, the patient was given IVF: 0.9% 500 ml IV bolus, then remaining volume at 100 ml.hr (re: caution because of EF 45 to 50% on 6/23/2015)     > Analgesia   > On 4/10/2017:    > Increased Gabapentin from 300 mg to 400 mg PO BID    > Increased Percocet 5/325 to Percocet 7.5/325 1 tab PO TID (8AM, 12PM, 4PM)    > Increased Percocet 5/325 to Percocet 7.5/325 1 tab PO q 4 hr PRN for pain level greater than 4/10 (from 8PM to 4AM only)    > On 4/11/2017, added Cyclobenzaprine 5 mg PO q 8 hr   > On 4/14/2017:    > Discontinued Cyclobenzaprine 5 mg PO q 8 hr    > Increased Gabapentin from 400 mg to 600 mg PO BID   > On 4/19/2017, decreased Gabapentin from 600 mg to 400 mg PO BID   > Continue:    > Acetaminophen 650 mg PO q 4 hr PRN for pain level less than 5/10    > Gabapentin 400 mg PO BID    > Percocet 7.5/325 1 tab PO TID (8AM, 12PM, 4PM)    > Percocet 7.5/325 1 tab PO q 4 hr PRN for pain level greater than 4/10 (from 8PM to 4AM only)    > Warm compress to right thigh/hip PRN      MEDICATIONS ON DISCHARGE:    Current Discharge Medication List      START taking these medications    Details   gabapentin (NEURONTIN) 400 mg capsule Take 1 Cap by mouth two (2) times a day. Indications: NEUROPATHIC PAIN  Qty: 30 Cap, Refills: 0    Associated Diagnoses: Iliopsoas muscle hematoma, right, subsequent encounter      acetaminophen (TYLENOL) 325 mg tablet Take 2 Tabs by mouth every four (4) hours as needed (for fever or pain level less than 5/10). Indications: Fever, Pain  Qty: 30 Tab, Refills: 0    Associated Diagnoses: Iliopsoas muscle hematoma, right, subsequent encounter      docusate sodium (COLACE) 100 mg capsule Take 1 Cap by mouth daily (after breakfast). Indications: Constipation  Qty: 15 Cap, Refills: 0      senna-docusate (PERICOLACE) 8.6-50 mg per tablet Take 2 Tabs by mouth daily (after dinner).  Indications: Constipation  Qty: 30 Tab, Refills: 0      oxyCODONE-acetaminophen (PERCOCET 7.5) 7.5-325 mg per tablet Take 1 Tab by mouth every four (4) hours as needed (for pain level greater than 4/10). Max Daily Amount: 6 Tabs. Indications: Pain  Qty: 50 Tab, Refills: 0    Associated Diagnoses: Iliopsoas muscle hematoma, right, subsequent encounter         CONTINUE these medications which have CHANGED    Details   cholecalciferol (VITAMIN D3) 1,000 unit tablet Take 2 Tabs by mouth daily. Indications: PREVENTION OF VITAMIN D DEFICIENCY  Qty: 30 Tab, Refills: 0      carvedilol (COREG) 6.25 mg tablet Take 1 Tab by mouth two (2) times daily (with meals). Indications: hypertension  Qty: 30 Tab, Refills: 0    Associated Diagnoses: Benign hypertensive heart disease with systolic CHF, NYHA class 2 (HCC)      allopurinol (ZYLOPRIM) 100 mg tablet Take 1 Tab by mouth daily. Indications: GOUT  Qty: 15 Tab, Refills: 0    Associated Diagnoses: Chronic gout, unspecified cause, unspecified site      insulin glargine (LANTUS) 100 unit/mL injection 15 Units by SubCUTAneous route nightly. Indications: type 2 diabetes mellitus  Qty: 1 Vial, Refills: 0    Associated Diagnoses: Type 2 diabetes mellitus with diabetic neuropathy, with long-term current use of insulin (HCC)         CONTINUE these medications which have NOT CHANGED    Details   SITagliptin (JANUVIA) 50 mg tablet Take 50 mg by mouth daily. Indications: type 2 diabetes mellitus      potassium chloride (KLOR-CON M20) 20 mEq tablet Take 20 mEq by mouth two (2) times a day. Indications: HYPOKALEMIA PREVENTION      ondansetron (ZOFRAN ODT) 4 mg disintegrating tablet Take 4 mg by mouth every eight (8) hours as needed for Nausea. cyclobenzaprine (FLEXERIL) 10 mg tablet Take 10 mg by mouth as needed for Muscle Spasm(s). Indications: MUSCLE SPASM      ferrous sulfate 325 mg (65 mg iron) tablet Take 1 Tab by mouth two (2) times daily (with meals).   Qty: 30 Tab, Refills: 0      bumetanide (BUMEX) 1 mg tablet TAKE 1 TABLET TWICE A DAY  Qty: 180 Tab, Refills: 1      pravastatin (PRAVACHOL) 40 mg tablet Take 40 mg by mouth nightly. Indications: DYSLIPIDEMIA      insulin aspart (NOVOLOG) 100 unit/mL injection INITIATE INSULIN CORRECTIVE PROTOCOL (HR): Normal Insulin Sensitivity  For Blood Sugar (mg/dL) of:    Less than 150 =   0 units          150 -199 =   2 units 200 -249 =   4 units 250 -299 =   6 units 300 -349 =   8 units 350 and above =   10 units If 2 glucose readings are above 200 mg/dL  Qty: 10 mL, Refills: 6         STOP taking these medications       gabapentin (NEURONTIN) 300 mg tablet Comments:   Reason for Stopping:         warfarin (COUMADIN) 2 mg tablet Comments:   Reason for Stopping:           Estimated Glomerular Filtration Rate:  CKD-EPI:   On admission, estimated GFR was 53.5 mL/min/1.73m2 based on a Creatinine of 1.22 mg/dl. Most recent estimated GFR was 31.3 mL/min/1.73m2 based on a Creatinine of 1.90 mg/dl.     MDRD:   On admission, estimated GFR was 56.7 mL/min/1.73m2 based on a Creatinine of 1.22 mg/dl. Most recent estimated GFR was 34 mL/min/1.73m based on a Creatinine of 1.90 mg/dl. 2.      DISCHARGE VITAL SIGNS:  Visit Vitals    /66 (BP 1 Location: Left arm, BP Patient Position: At rest)    Pulse 94    Temp (!) 100.7 °F (38.2 °C)    Resp 20    Ht 5' 7\" (1.702 m)    Wt 105.7 kg (233 lb)    SpO2 96%    Breastfeeding No    BMI 36.49 kg/m2       DISCHARGE PHYSICAL EXAMINATION:  GENERAL SURVEY: Patient is awake, alert, oriented x 3, laying supine in bed, not in acute respiratory distress.   HEENT: pink palpebral conjunctivae, anicteric sclerae, no nasoaural discharge, moist oral mucosa  NECK: supple, no jugular venous distention, no palpable lymph nodes  CHEST/LUNGS: symmetrical chest expansion, good air entry, clear breath sounds  HEART: adynamic precordium, good S1 S2, no S3, regular rhythm, tachycardic, no murmurs  ABDOMEN: obese, bowel sounds appreciated, soft, non-tender  EXTREMITIES: pink nailbeds, no edema, full and equal pulses, no calf tenderness   NEUROLOGICAL EXAM: The patient is awake, alert and oriented x3, able to answer questions fairly appropriately, able to follow 1 and 2 step commands. Able to tell time from the wall clock. Cranial nerves II-XII are grossly intact. (+) circumferential numbness of the RLE from the hip down. Motor strength is 4+/5 on BUE, 0/5 on BLE. CONDITION ON DISCHARGE: Guarded. DISPOSITION: Patient clinically deteriorated with numbness and paraplegia of both lower extremities. The patient was discharged/transferred to [facility] as an inpatient under the service of the [facility] (Dr. Yoli Anne). FOLLOW-UP RECOMMENDATIONS:   Follow-up Information     Follow up With Details Comments Contact Info    Betty Luo DO On 5/4/2018 1045am [address]  will contact you [address] [place] [address] [phone]          OTHER INSTRUCTIONS:  1. Diet. NPO.  2. Activity. As tolerated. 3. Safety / fall precautions. TIME SPENT ON DISCHARGE ACTIVITIES: More than 30 minutes.       Signed:  Pepe De Santiago MD    4/20/2017

## 2017-04-19 NOTE — FACE TO FACE
[facility] [address] [address] [place]    Name: Shanique Lino Age / Sex: 77 y.o. / female   CSN: [id number] MRN: [id number]   [facility] Date: 4/6/2017 Discharge Date: 4/19/2017     Primary Care Provider: Dr. Taylor Mueller      Primary Rehabilitation Diagnosis  1. Impaired Mobility and ADLs  2. Right iliopsoas muscle hematoma, secondary to supratherapeutic INR/anticoagulant therapy      Comorbidities   Nonischemic cardiomyopathy    History of complete heart block    Biventricular implantable cardioverter-defibrillator in situ    Left bundle branch block (LBBB) on electrocardiogram    Type 2 diabetes mellitus with diabetic neuropathy    Dyslipidemia    Diabetic neuropathy associated with type 2 diabetes mellitus    Obstructive sleep apnea on CPAP    History of AICD generator infection     Difficult airway for intubation    Benign hypertensive heart disease with systolic CHF, NYHA class 2     Decreased calculated glomerular filtration rate (GFR)    Chronic anemia    History of deep venous thrombosis    Anticoagulated on Coumadin    Pacemaker twiddler's syndrome    Chronic systolic heart failure     Obesity (BMI 35.0-39.9 without comorbidity)     History of pyelonephritis    History of Coumadin therapy    Gout       History of the Present Illness: The patient is a 51-year-old [facility] female with multiple medical comorbidities who was admitted to [facility] on 3/30/2017 due to left hip pain. The patient was apparently well until two weeks prior to admission, the patient had persistent right flank pain. The patient was brought to the [facility] Emergency Department for further evaluation. The patient was seen and examined by Dr. Wade Borjas. The right flank pain was attributed to the recently diagnosed pyelonephritis.  The patient was discharged to home with Ciprofloxacin and Percocet. The pain had improved but 2 days prior to admission, the patient fell and she landed on her right hip. Due to the persistence of the right hip pain, the patient was brought back to the [facility] Emergency Department for further evaluation. WBC count was 19.0. CT scan of the abdomen and pelvis (3/30/2017) showed an asymmetrically enlarged right iliopsoas muscle compared to the left extending to the right groin region with evidence of intramuscular hematoma in different stage; this is new compared to the recent CT 3/16/17; mild fatty stranding noted in the right pericolonic gutter and the right groin; no additional hematoma or other significant injury seen. The patient was admitted under the service of the [facility] (Dr. Makayla Carpio) with an admitting impression of SIRS with iliopsoas muscle hematoma. Coumadin was put on hold. Patient was given Phytonadione SC and 2 units FFP. Urine culture done 3/14/2017 yielded growth of >100,000 colonies/ml of Enterobacter aerogenes. Patient had completed a treatment course of oral Ciprofloxacin prescribed by Dr. Alli Reyes. Urinalysis done 3/30/2017 showed nitrites negative, leukocyte esterase negative, WBC 0 to 3, bacteria few. Aztreonam was given for treatment of possible partially treated pyelonephritis. Cardiology consult (Dr. Abena Carbajal) was called for evaluation and comanagement. Interventional Radiology consult (Dr. Gerson Nava) was called for possible drainage of the right iliopsoas abscess. Dr. Niranjan Pina stated he didn't believe the patient would not benefit from draining unless there are signs of superinfection. Urine culture yielded no growth after 2 days. Aztreonam was given for 5 days. Urology consult (Dr. Fabiola Girard) was called for evaluation and comanagement.  Patient was noted to have urinary retention so an indwelling perez catheter was placed. CT scan of the abdomen and pelvis (4/02/2017) showed redemonstration of prominent hematoma within the right psoas muscle, essentially unchanged as compared to previous study on 3/13/2017; around right psoas muscle there are are some hazy densities, indicating mild reactive or congestive changes similar to previous study; otherwise, there is no definable confluent inflammatory phlegmon around right psoas muscle; there is no evidence of hematoma in left psoas muscle or in rest of the retroperitoneum. Cardiology (Dr. Angela Berry) recommended to NOT restart anticoagulation. The patient had remained hemodynamically stable but due to the above events, the patient was noted to be generally weak and with impaired mobility and ADLs. Patient was felt to be a good candidate for acute inpatient rehabilitation. Upon evaluation by Physical Therapy and Occupational Therapy, the patient was recommended for acute inpatient rehabilitation. The patient was discharged and was subsequently admitted to the [facility] for Physical Rehabilitation for intensive rehabilitation to help recover strength, function and mobility. Past Medical History:  Past Medical History:   Diagnosis Date    Benign hypertensive heart disease with systolic CHF, NYHA class 2 ([address] 9/5/2012    Biventricular implantable cardioverter-defibrillator in situ 04/28/2005    Upgraded to BiV AICD; gen change 4/2008; pocket revision 10/2009; Abdominal - done on 8/22/2012 by Dr. Lara Better Cardiac cath 08/15/1996    Patent coronaries. Elev LVEDP. EF 50-55%.  Cardiac echocardiogram 06/23/2015    Ltd study. EF 45-50%. Mild, diffuse hypk. Severe apical hypk. No mass or thrombus was clearly identified, although imaging was suboptimal.      Cardiac nuclear imaging test 06/19/2015    Fixed distal apical, distal septal defect more likely due to RV pacing than prior infarct. No ischemia. EF 46%. RWMA c/w RV pacing. Nondiagnostic EKG on pharm stress test.      Cardiovascular lower extremity venous duplex 09/04/2012    Acute, non-occlusive DVT in CFV on right. No DVT on left. No superficial thrombosis bilaterally.  Cardiovascular upper extremity venous duplex 08/27/2012    DVT in axillary vein on left. Left subclavian was not visualized.  Chronic anemia 9/5/2012    Chronic systolic heart failure (HCC)     Decreased calculated glomerular filtration rate (GFR) 3/30/2017    Calculated GFR equivalent to that of CKD stage 3 = 30-59 ml/min    Diabetic neuropathy associated with type 2 diabetes mellitus ([address] 6/28/2011    Difficult airway for intubation 08/22/2012    see anesthesia airway note    Dyslipidemia 6/28/2011    Gout     History of complete heart block 6/28/2011    History of Coumadin therapy     Anticoagulation for DVT of the LUE; Discontinued on 3/30/2017    History of deep venous thrombosis 9/5/2012    Left upper extremity    History of pyelonephritis 3/30/2017    Left bundle branch block (LBBB) on electrocardiogram 6/28/2011    Nonischemic cardiomyopathy ([address] 6/28/2011    Obesity (BMI 35.0-39.9 without comorbidity) ([address] 3/13/2017    Obstructive sleep apnea on CPAP 2/7/2012    Psoas hematoma, right, secondary to anticoagulant therapy 3/30/2017    Type 2 diabetes mellitus with diabetic neuropathy ([address] 6/28/2011       Past Surgical History:  Past Surgical History:   Procedure Laterality Date    HX CARPAL TUNNEL RELEASE  4/07    right     HX CHOLECYSTECTOMY  1994    HX HYSTERECTOMY  1973    HX OTHER SURGICAL  6/11/2012    AICD revision    HX PACEMAKER  4/28/2005    [employer] AICD       Medications on Discharge:    Current Discharge Medication List      START taking these medications    Details   gabapentin (NEURONTIN) 400 mg capsule Take 1 Cap by mouth two (2) times a day.  Indications: NEUROPATHIC PAIN  Qty: 30 Cap, Refills: 0    Associated Diagnoses: Iliopsoas muscle hematoma, right, subsequent encounter      acetaminophen (TYLENOL) 325 mg tablet Take 2 Tabs by mouth every four (4) hours as needed (for fever or pain level less than 5/10). Indications: Fever, Pain  Qty: 30 Tab, Refills: 0    Associated Diagnoses: Iliopsoas muscle hematoma, right, subsequent encounter      docusate sodium (COLACE) 100 mg capsule Take 1 Cap by mouth daily (after breakfast). Indications: Constipation  Qty: 15 Cap, Refills: 0      senna-docusate (PERICOLACE) 8.6-50 mg per tablet Take 2 Tabs by mouth daily (after dinner). Indications: Constipation  Qty: 30 Tab, Refills: 0      oxyCODONE-acetaminophen (PERCOCET 7.5) 7.5-325 mg per tablet Take 1 Tab by mouth every four (4) hours as needed (for pain level greater than 4/10). Max Daily Amount: 6 Tabs. Indications: Pain  Qty: 50 Tab, Refills: 0    Associated Diagnoses: Iliopsoas muscle hematoma, right, subsequent encounter         CONTINUE these medications which have CHANGED    Details   cholecalciferol (VITAMIN D3) 1,000 unit tablet Take 2 Tabs by mouth daily. Indications: PREVENTION OF VITAMIN D DEFICIENCY  Qty: 30 Tab, Refills: 0      carvedilol (COREG) 6.25 mg tablet Take 1 Tab by mouth two (2) times daily (with meals). Indications: hypertension  Qty: 30 Tab, Refills: 0    Associated Diagnoses: Benign hypertensive heart disease with systolic CHF, NYHA class 2 (HCC)      allopurinol (ZYLOPRIM) 100 mg tablet Take 1 Tab by mouth daily. Indications: GOUT  Qty: 15 Tab, Refills: 0    Associated Diagnoses: Chronic gout, unspecified cause, unspecified site      insulin glargine (LANTUS) 100 unit/mL injection 15 Units by SubCUTAneous route nightly. Indications: type 2 diabetes mellitus  Qty: 1 Vial, Refills: 0    Associated Diagnoses: Type 2 diabetes mellitus with diabetic neuropathy, with long-term current use of insulin ([facility])         CONTINUE these medications which have NOT CHANGED    Details   SITagliptin (JANUVIA) 50 mg tablet Take 50 mg by mouth daily.  Indications: type 2 diabetes mellitus      potassium chloride (KLOR-CON M20) 20 mEq tablet Take 20 mEq by mouth two (2) times a day. Indications: HYPOKALEMIA PREVENTION      ondansetron (ZOFRAN ODT) 4 mg disintegrating tablet Take 4 mg by mouth every eight (8) hours as needed for Nausea. cyclobenzaprine (FLEXERIL) 10 mg tablet Take 10 mg by mouth as needed for Muscle Spasm(s). Indications: MUSCLE SPASM      ferrous sulfate 325 mg (65 mg iron) tablet Take 1 Tab by mouth two (2) times daily (with meals). Qty: 30 Tab, Refills: 0      bumetanide (BUMEX) 1 mg tablet TAKE 1 TABLET TWICE A DAY  Qty: 180 Tab, Refills: 1      pravastatin (PRAVACHOL) 40 mg tablet Take 40 mg by mouth nightly. Indications: DYSLIPIDEMIA      insulin aspart (NOVOLOG) 100 unit/mL injection INITIATE INSULIN CORRECTIVE PROTOCOL (HR): Normal Insulin Sensitivity  For Blood Sugar (mg/dL) of:    Less than 150 =   0 units          150 -199 =   2 units 200 -249 =   4 units 250 -299 =   6 units 300 -349 =   8 units 350 and above =   10 units If 2 glucose readings are above 200 mg/dL  Qty: 10 mL, Refills: 6         STOP taking these medications       gabapentin (NEURONTIN) 300 mg tablet Comments:   Reason for Stopping:         warfarin (COUMADIN) 2 mg tablet Comments:   Reason for Stopping:               Condition on Discharge: Stable. Ambulation Gait  Amount of Assistance: 4 (Minimal assistance)  Distance (ft): 12 Feet (ft) (x 2 trials)  Assistive Device: Gait belt, Walker, rolling     Wheelchair Mobility Wheelchair Mobility/Management  Able to Propel (ft): 250 feet  Functional Level: 5  Curbs/Ramps Assist Required (FIM Score): 0 (Not tested)  Wheelchair Setup Assist Required : 4 (Minimal assistance)  Wheelchair Management: Manages left brake, Manages right brake, Manages left footrest, Manages right footrest         Disposition: Patient clinically improved and was discharged to home with home health physical therapy and occupational therapy.  The patient is temporarily homebound secondary to functional deficits due to right iliopsoas muscle hematoma, secondary to supratherapeutic INR/anticoagulant therapy. She can ambulate using a rolling walker (see above). The patient would benefit from continued skilled physical therapy in order to improve independent functional mobility within the home with use of least restrictive device. The patient would also benefit from continued skilled occupational therapy in order to improve self care and functional mobility within the home with use of least restrictive device. Due to the abovementioned data, I certify that the patient needs intermittent Physical Therapy and Occupational Therapy. I will NOT be following this patient in the Community and Dr. Idalia Bean will be responsible for signing the [facility] of Care. In compliance with the Affordable Care Act, I certify that this patient was managed by me during this hospitalization and that I had a Face-to-Face Encounter that meets the physician Face-to-Face Encounter requirements.       Signed:    Bo Bravo MD    April 19, 2017

## 2017-04-19 NOTE — REHAB NOTE
[facility]    Bedside and Verbal shift change report given to Kimi Mock RN  (oncoming nurse) by Xander Parham RN   (offgoing nurse). Report included the following information SBAR, Kardex, MAR and Recent Results. Situation:   Code Status: Full Code   Reason for Admission: Psoas hematoma  Hospital Day: 13   Problem List:   Hospital Problems  Date Reviewed: 4/18/2017          Codes Class Noted POA    History of Coumadin therapy ICD-10-CM: Z79.01  ICD-9-CM: V58.61  Unknown Yes    Overview Signed 4/6/2017 10:35 PM by Brandon Laguna MD     Anticoagulation for chronic atrial fibrillation; Discontinued on 3/30/2017             Decreased calculated glomerular filtration rate (GFR) ICD-10-CM: R94.4  ICD-9-CM: 794.4  3/30/2017 Yes    Overview Signed 4/6/2017  5:47 PM by Brandon Laguna MD     Calculated GFR equivalent to that of CKD stage 3 = 30-59 ml/min             History of pyelonephritis ICD-10-CM: Z87.440  ICD-9-CM: V13.02  3/30/2017 Yes        Iliopsoas muscle hematoma ICD-10-CM: T53.89MW  ICD-9-CM: 924.00  3/30/2017 Yes        * (Principal)Psoas hematoma, right, secondary to anticoagulant therapy ICD-10-CM: S30. 1XXA  ICD-9-CM: 924.9, E934.2  3/30/2017 Yes        Generalized weakness ICD-10-CM: R53.1  ICD-9-CM: 780.79  3/30/2017 Yes        Impaired mobility and ADLs ICD-10-CM: Z74.09  ICD-9-CM: 799.89  3/30/2017 Yes        Benign hypertensive heart disease with systolic CHF, NYHA class 2 (HCC) (Chronic) ICD-10-CM: I11.0, I50.20  ICD-9-CM: 402.11, 428.20, 428.0  9/5/2012 Yes        Type 2 diabetes mellitus with diabetic neuropathy (HCC) (Chronic) ICD-10-CM: E11.40  ICD-9-CM: 250.60, 357.2  6/28/2011 Yes              Background:   Past Medical History:   Past Medical History:   Diagnosis Date    Benign hypertensive heart disease with systolic CHF, NYHA class 2 ([facility] [address] 9/5/2012    Biventricular implantable cardioverter-defibrillator in situ 04/28/2005    Upgraded to BiV AICD; gen change 4/2008; pocket revision 10/2009; Abdominal - done on 8/22/2012 by Dr. Daisy Hensley Cardiac cath 08/15/1996    Patent coronaries. Elev LVEDP. EF 50-55%.  Cardiac echocardiogram 06/23/2015    Ltd study. EF 45-50%. Mild, diffuse hypk. Severe apical hypk. No mass or thrombus was clearly identified, although imaging was suboptimal.      Cardiac nuclear imaging test 06/19/2015    Fixed distal apical, distal septal defect more likely due to RV pacing than prior infarct. No ischemia. EF 46%. RWMA c/w RV pacing. Nondiagnostic EKG on pharm stress test.      Cardiovascular lower extremity venous duplex 09/04/2012    Acute, non-occlusive DVT in CFV on right. No DVT on left. No superficial thrombosis bilaterally.  Cardiovascular upper extremity venous duplex 08/27/2012    DVT in axillary vein on left. Left subclavian was not visualized.     Chronic anemia 9/5/2012    Chronic systolic heart failure (HCC)     Decreased calculated glomerular filtration rate (GFR) 3/30/2017    Calculated GFR equivalent to that of CKD stage 3 = 30-59 ml/min    Diabetic neuropathy associated with type 2 diabetes mellitus ([address] 6/28/2011    Difficult airway for intubation 08/22/2012    see anesthesia airway note    Dyslipidemia 6/28/2011    Gout     History of complete heart block 6/28/2011    History of Coumadin therapy     Anticoagulation for DVT of the LUE; Discontinued on 3/30/2017    History of deep venous thrombosis 9/5/2012    Left upper extremity    History of pyelonephritis 3/30/2017    Left bundle branch block (LBBB) on electrocardiogram 6/28/2011    Nonischemic cardiomyopathy ([address] 6/28/2011    Obesity (BMI 35.0-39.9 without comorbidity) ([address] 3/13/2017    Obstructive sleep apnea on CPAP 2/7/2012    Psoas hematoma, right, secondary to anticoagulant therapy 3/30/2017    Type 2 diabetes mellitus with diabetic neuropathy ([address] 6/28/2011      Patient taking anticoagulants no    Patient has a defibrillator: yes     Assessment:   Changes in Assessment throughout shift: no     Patient has central line: no Reasons if yes: Insertion date: Last dressing date:   Patient has Krueger Cath: no Reasons if yes: Insertion date:     Last Vitals:     Vitals:    04/18/17 0832 04/18/17 1600 04/18/17 2059 04/19/17 0717   BP: 139/71 124/63 125/74 106/68   Pulse: 84 74 78 78   Resp: 18 18 20 20   Temp: 98.2 °F (36.8 °C) 98.2 °F (36.8 °C) 100.3 °F (37.9 °C) 99.2 °F (37.3 °C)   SpO2: 93% 100% 95% 100%   Weight:       Height:            PAIN    Pain Assessment    Pain Intensity 1: 0 (04/19/17 0627) Pain Intensity 1: 2 (12/29/14 1105)    Pain Location 1: Ankle, Back Pain Location 1: Abdomen    Pain Intervention(s) 1: Medication (see MAR) Pain Intervention(s) 1: Medication (see MAR)  Patient Stated Pain Goal: 0 Patient Stated Pain Goal: 0  o Intervention effective: no  o Other actions taken for pain: pain medicine     Skin Assessment  Skin color Skin Color: Appropriate for ethnicity  Condition/Temperature Skin Condition/Temp: Warm  Integrity Skin Integrity: Intact  Turgor    Weekly Pressure Ulcer Documentation Weekly Pressure Ulcer Documentation: No Pressure Ulcer Noted-Pressure Ulcer Prevention Initiated  Wound Prevention & Protection Methods  Orientation of wound Orientation of Wound Prevention: Posterior  Location of Prevention Location of Wound Prevention: Sacrum/Coccyx  Dressing Present Dressing Present : No  Dressing Status    Wound Offloading Wound Offloading (Prevention Methods): Bed, pressure redistribution/air     INTAKE/OUPUT    Date 04/18/17 0700 - 04/19/17 0659 04/19/17 0700 - 04/20/17 0659   Shift 3359-5042 7887-6604 24 Hour Total 6527-3231 9123-5457 24 Hour Total   I  N  T  A  K  E   P.O. 930  930         P. O. 930  930       Shift Total  (mL/kg) 930  (8.8)  930  (8.8)      O  U  T  P  U  T   Urine  (mL/kg/hr)            Urine Occurrence(s) 0 x 2 x 2 x       Stool            Stool Occurrence(s) 0 x 0 x 0 x       Shift Total  (mL/kg)           930      Weight (kg) 105.7 105.7 105.7 105.7 105.7 105.7       Recommendations:  1. Patient needs and requests: pain medicine    2. Diet: diabetic    3. Pending tests/procedures: no     4. Functional Level/Equipment: 1 x person assist    5. Estimated Discharge Date: TDB Posted on Whiteboard in Patients Room: [facility] Safety Check    A safety check occurred in the patient's room between off going nurse and oncoming nurse listed above. The safety check included the below items  Area Items   H  High Alert Medications - Verify all high alert medication drips (heparin, PCA, etc.)   E  Equipment - Suction is set up for ALL patients (with vania)  - Red plugs utilized for all equipment (IV pumps, etc.)  - WOWs wiped down at end of shift.  - Room stocked with oxygen, suction, and other unit-specific supplies   A  Alarms - Bed alarm is set for fall risk patients  - Ensure chair alarm is in place and activated if patient is up in a chair   L  Lines - Check IV for any infiltration  - Krueger bag is empty if patient has a Krueger   - Tubing and IV bags are labeled   S  Safety   - Room is clean, patient is clean, and equipment is clean. - Hallways are clear from equipment besides carts. - Fall bracelet on for fall risk patients  - Ensure room is clear and free of clutter  - Suction is set up for ALL patients (with vania)  - Hallways are clear from equipment besides carts.    - Isolation precautions followed, supplies available outside room, sign posted

## 2017-04-19 NOTE — PROGRESS NOTES
[facility] PHYSICAL REHABILITATION  [address] [place] [address], [address]     INPATIENT REHABILITATION  DAILY PROGRESS NOTE     Date: 4/19/2017    Name: Sebas Hedrick Age / Sex: 77 y.o. / female   CSN: [id number] MRN: [id number]   [facility] Date: 4/6/2017 Length of Stay: 13 days     Primary Rehab Diagnosis: Impaired Mobility and ADLs secondary to Right iliopsoas muscle hematoma, secondary to supratherapeutic INR/anticoagulant therapy      Subjective:     Patient seen and examined. Blood pressure controlled. Blood sugars controlled. Patient's Complaint:   Increased weakness/numbness of right leg compared to yesterday     Pain Control: ongoing significant pain in which is stable and controlled by current meds      Objective:     Vital Signs:  Patient Vitals for the past 24 hrs:   BP Temp Pulse Resp SpO2   04/19/17 0717 106/68 99.2 °F (37.3 °C) 78 20 100 %   04/18/17 2059 125/74 100.3 °F (37.9 °C) 78 20 95 %   04/18/17 1600 124/63 98.2 °F (36.8 °C) 74 18 100 %        Physical Exam:  GENERAL SURVEY: Patient is awake, alert, oriented x 3, sitting comfortably on the chair, not in acute respiratory distress. HEENT: pink palpebral conjunctivae, anicteric sclerae, no nasoaural discharge, moist oral mucosa  NECK: supple, no jugular venous distention, no palpable lymph nodes  CHEST/LUNGS: symmetrical chest expansion, good air entry, clear breath sounds  HEART: adynamic precordium, good S1 S2, no S3, regular rhythm, no murmurs  ABDOMEN: obese, bowel sounds appreciated, soft, non-tender  EXTREMITIES: pink nailbeds, no edema, full and equal pulses, no calf tenderness   NEUROLOGICAL EXAM: The patient is awake, alert and oriented x3, able to answer questions fairly appropriately, able to follow 1 and 2 step commands. Able to tell time from the wall clock. Cranial nerves II-XII are grossly intact. (+) circumferential numbness of the RLE from the hip down.  Motor strength is 4+/5 on BUE, 4-/5 on the left hip, 4+/5 on the left knee and left ankle, 2/5 on the right hip (due to pain), 2+/5 on the right knee (due to right hip pain), 3/5 on the right ankle. Current Medications:  Current Facility-Administered Medications   Medication Dose Route Frequency    bumetanide (BUMEX) tablet 1 mg  1 mg Oral BID    gabapentin (NEURONTIN) capsule 400 mg  400 mg Oral BID    pravastatin (PRAVACHOL) tablet 40 mg  40 mg Oral QHS    cholecalciferol (VITAMIN D3) tablet 2,000 Units  2,000 Units Oral DAILY    insulin glargine (LANTUS) injection 15 Units  15 Units SubCUTAneous QHS    modafinil (PROVIGIL) tablet 100 mg  100 mg Oral DAILY    melatonin tablet 3 mg  3 mg Oral QHS    SITagliptin (JANUVIA) tablet 50 mg  50 mg Oral DAILY    naloxegol (MOVANTIK) tablet 25 mg  25 mg Oral ACB    carvedilol (COREG) tablet 6.25 mg  6.25 mg Oral BID WITH MEALS    oxyCODONE-acetaminophen (PERCOCET 7.5) 7.5-325 mg per tablet 1 Tab  1 Tab Oral TID    oxyCODONE-acetaminophen (PERCOCET 7.5) 7.5-325 mg per tablet 1 Tab  1 Tab Oral Q4H PRN    ondansetron hcl (ZOFRAN) tablet 4 mg  4 mg Oral TID PRN    allopurinol (ZYLOPRIM) tablet 100 mg  100 mg Oral DAILY    docusate sodium (COLACE) capsule 100 mg  100 mg Oral DAILY AFTER BREAKFAST    senna-docusate (PERICOLACE) 8.6-50 mg per tablet 2 Tab  2 Tab Oral PCD    acetaminophen (TYLENOL) tablet 650 mg  650 mg Oral Q4H PRN    bisacodyl (DULCOLAX) tablet 10 mg  10 mg Oral Q48H PRN    insulin lispro (HUMALOG) injection   SubCUTAneous TIDAC    folic acid (FOLVITE) tablet 1 mg  1 mg Oral DAILY    glucose chewable tablet 16 g  4 Tab Oral PRN    glucagon (GLUCAGEN) injection 1 mg  1 mg IntraMUSCular PRN    dextrose (D50W) injection syrg 12.5-25 g  25-50 mL IntraVENous PRN    Lactobacillus Acidoph & Bulgar (FLORANEX) tablet 2 Tab  2 Tab Oral BID       Allergies:   Allergies   Allergen Reactions    Vancomycin Itching    Ampicillin Itching    Bactrim [Sulfamethoxazole-Trimethoprim] Unknown (comments)    Blueberry Swelling     Causes throat swelling    Ciprofloxacin Itching    Codeine Other (comments)     Jumpy feeling    Crestor [Rosuvastatin] Itching    Darvocet A500 [Propoxyphene N-Acetaminophen] Itching    Demerol [Meperidine] Itching    Levaquin [Levofloxacin] Itching    Lipitor [Atorvastatin] Myalgia    Magnesium Oxide Itching     nausea    Minocin [Minocycline] Unknown (comments)    Pcn [Penicillins] Itching    Pravachol [Pravastatin] Swelling     Swelling in mouth     Sulfa (Sulfonamide Antibiotics) Itching    Ultracet [Tramadol-Acetaminophen] Itching    Vicodin [Hydrocodone-Acetaminophen] Unknown (comments)    Vytorin 10-10 [Ezetimibe-Simvastatin] Myalgia    Percodan [Oxycodone Hcl-Oxycodone-Asa] Itching       Functional Progress:    PHYSICAL THERAPY    ON ADMISSION MOST RECENT   Wheelchair Mobility/Management  Able to Propel (ft): 155 feet (B UE technique with SPV verbal cues for technique)  Functional Level: 5  Curbs/Ramps Assist Required (FIM Score): 0 (Not tested)  Wheelchair Setup Assist Required : 3 (Moderate assistance)  Wheelchair Management: Manages left brake, Manages right brake (w/ extender, verbal and visual cues + min a x placement) Wheelchair Mobility/Management  Able to Propel (ft): 250 feet  Functional Level: 5  Curbs/Ramps Assist Required (FIM Score): 0 (Not tested)  Wheelchair Setup Assist Required : 4 (Minimal assistance)  Wheelchair Management: Manages left brake, Manages right brake, Manages left footrest, Manages right footrest     Gait  Amount of Assistance: 0 (Not tested) (Pt unable at this time)  Distance (ft): 0 Feet (ft)  Assistive Device:  (NA) Gait  Amount of Assistance: 4 (Minimal assistance)  Distance (ft): 12 Feet (ft) (x 2 trials)  Assistive Device: Gait belt, Walker, rolling     Balance-Sitting/Standing  Sitting - Static: Poor (constant support)  Sitting - Dynamic: Poor (constant support)  Standing - Static: Poor  Standing - Dynamic : Impaired Balance-Sitting/Standing  Sitting - Static: Good (unsupported)  Sitting - Dynamic: Good (unsupported)  Standing - Static: Fair  Standing - Dynamic : Impaired     Bed/Mat Mobility  Rolling Right : 1 (Total assistance) (unable to assess 2/2 inability to lay supine)  Rolling Left : 1 (Total assistance) (unable to assess 2/2 inability to lay supine)  Supine to Sit : 1 (Total assistance) (unable to assess 2/2 inability to lay supine)  Sit to Supine : 1 (Total assistance) (unable to assess 2/2 inability to lay supine) Bed/Mat Mobility  Rolling Right : 5 (Supervision)  Rolling Left : 5 (Supervision)  Supine to Sit : 5 (Supervision)  Sit to Supine : 5 (Supervision)     Transfers  Transfer Type: Other  Other: Patient performs stand step transfer with anterior aproach, B UE support, weight shift cues, step placement cues and controlled lowering. Patient with significant antalgia with R LE loading and forward flexed posturing requiring assist to achieve correct LE placement. Transfer Assistance : 4 (Minimal assistance)  Sit to Stand Assistance: Minimal assistance  Car Transfers: Not tested  Car Type: NA Transfers  Transfer Type:  Other  Other: Patient performs stand step transfer with cami x 1  for postural assist and R LE advancement assist required  Transfer Assistance : 4 (Minimal assistance)  Sit to Stand Assistance: Contact guard assistance  Car Transfers: Not tested  Car Type: NA     Steps or Stairs  Steps/Stairs Ambulated (#): 0 (Pt unable)  Level of Assist : 0 (Not tested)  Rail Use:  (NA) Steps or Stairs  Steps/Stairs Ambulated (#): 3  Level of Assist : 2 (Maximal assistance) (provided 2/2 anxiety and support 2/2 R knee \"buckling\" repor)  Rail Use: Both         Lab/Data Review:  Recent Results (from the past 24 hour(s))   GLUCOSE, POC    Collection Time: 04/18/17  4:39 PM   Result Value Ref Range    Glucose (POC) 137 (H) 70 - 110 mg/dL   GLUCOSE, POC    Collection Time: 04/18/17  8:26 PM   Result Value Ref Range Glucose (POC) 160 (H) 70 - 110 mg/dL   GLUCOSE, POC    Collection Time: 04/19/17  7:16 AM   Result Value Ref Range    Glucose (POC) 126 (H) 70 - 110 mg/dL       Estimated Glomerular Filtration Rate:  CKD-EPI:   On admission, estimated GFR was 53.5 mL/min/1.73m2 based on a Creatinine of 1.22 mg/dl. Most recent estimated GFR was 67.2 mL/min/1.73m2 based on a Creatinine of 1.01 mg/dl. MDRD:   On admission, estimated GFR was 56.7 mL/min/1.73m2 based on a Creatinine of 1.22 mg/dl. Most recent estimated GFR was 70.5 mL/min/1.73m based on a Creatinine of 1.01 mg/dl. 2. Assessment:     Primary Rehabilitation Diagnosis  1. Impaired Mobility and ADLs  2. Right iliopsoas muscle hematoma, secondary to supratherapeutic INR/anticoagulant therapy      Comorbidities   Nonischemic cardiomyopathy    History of complete heart block    Biventricular implantable cardioverter-defibrillator in situ    Left bundle branch block (LBBB) on electrocardiogram    Type 2 diabetes mellitus with diabetic neuropathy    Dyslipidemia    Diabetic neuropathy associated with type 2 diabetes mellitus    Obstructive sleep apnea on CPAP    History of AICD generator infection     Difficult airway for intubation    Benign hypertensive heart disease with systolic CHF, NYHA class 2     Decreased calculated glomerular filtration rate (GFR)    Chronic anemia    History of deep venous thrombosis    Anticoagulated on Coumadin    Pacemaker twiddler's syndrome    Chronic systolic heart failure     Obesity (BMI 35.0-39.9 without comorbidity)     History of pyelonephritis    History of Coumadin therapy    Gout       Plan:     1. Justification for continued stay: Good progression towards established rehabilitation goals. 2. Medical Issues being followed closely:    [x]  Fall and safety precautions     []  Wound Care     [x]  Bowel and Bladder Function     [x]  Fluid Electrolyte and Nutrition Balance     [x]  Pain Control      3. Issues that 24 hour rehabilitation nursing is following:    [x]  Fall and safety precautions     []  Wound Care     [x]  Bowel and Bladder Function     [x]  Fluid Electrolyte and Nutrition Balance     [x]  Pain Control      [x]  Assistance with and education on in-room safety with transfers to and from the bed, wheelchair, toilet and shower. 4. Acute rehabilitation plan of care:    [x]  Continue current care and rehab. [x]  Physical Therapy           [x]  Occupational Therapy           []  Speech Therapy     []  Hold Rehab until further notice     5. Medications:    [x]  MAR Reviewed     [x]  Continue Present Medications     6. DVT Prophylaxis:      []  Lovenox     []  Unfractionated Heparin     []  Coumadin     []  Xarelto     [x]  MIMI Stockings     [x]  Sequential Compression Device     []  None     7. Orders:   > Right iliopsoas muscle hematoma, secondary to supratherapeutic INR/anticoagulant therapy   > CT scan of the abdomen and pelvis (3/30/2017) showed an asymmetrically enlarged right iliopsoas muscle compared to the left extending to the right groin region with evidence of intramuscular hematoma in different stage; this is new compared to the recent CT 3/16/17; mild fatty stranding noted in the right pericolonic gutter and the right groin; no additional hematoma or other significant injury seen. > CT scan of the abdomen and pelvis (4/02/2017) showed redemonstration of prominent hematoma within the right psoas muscle, essentially unchanged as compared to previous study on 3/13/2017; around right psoas muscle there are are some hazy densities, indicating mild reactive or congestive changes similar to previous study; otherwise, there is no definable confluent inflammatory phlegmon around right psoas muscle; there is no evidence of hematoma in left psoas muscle or in rest of the retroperitoneum.    > Coumadin had been discontinued on 3/30/2017   > INR (4/6/2017) = 1.3   > Hgb/Hct (4/7/2017) = 8. 8/26.5    > Hgb/Hct (4/10/2017) = 9.0/27.2    > Hgb/Hct (4/13/2017) = 8.7/26.8    > Hgb/Hct (4/17/2017) = 9.3/28.3      > Benign hypertensive heart disease with chronic systolic heart failure   > On 4/10/2017, decreased Carvedilol from 12.5 mg to 6.25 mg PO BID with meals (8AM, 5PM)   > Continue Carvedilol 6.25 mg PO BID with meals (8AM, 5PM)     > History of pyelonephritis   > Patient had completed a 5-day treatment course of Aztreonam during his stay at [facility]    > On 4/7/2017, patient was started on Floranex 2 tabs PO BID    > Continue Floranex 2 tabs PO BID     > Type 2 diabetes mellitus with diabetic neuropathy   > On 4/8/2017, decreased Lantus from 24 units SC BID to 25 units SC q HS   > On 4/12/2017, decreased Lantus from 25 units to 20 units SC q HS   > On 4/13/2017, resumed Sitagliptin 50 mg PO once daily   > On 4/15/2017, decreased Lantus from 20 units to 15 units SC q HS   > Continue:    > Sitagliptin 50 mg PO once daily    > Lantus 15 units SC q HS    > Humalog insulin sliding scale SC TID AC only     > Gout / Hyperuricemia    > Prior to admission to [facility], the patient was on Allopurinol 100 mg PO BID   > Uric acid (4/7/2017) = 3.8   > On 4/7/2017, decreased Allopurinol from 100 mg PO BID to 100 mg PO once daily   > Continue Allopurinol 100 mg PO once daily     > Constipation   > On 4/10/2017, discontinued Polyethylene glycol 17 grams in 8 oz water PO once daily q 7PM    > Add Naloxegol 25 mg PO once daily before breakfast   > Continue:    > Docusate sodium 100 mg PO once daily after breakfast    > Pericolace 2 tabs PO once daily after dinner    > Neurocognitive modulation    > On 4/14/2017, patient was started on:    > Modafinil 100 mg PO q AM    > Melatonin 3 mg PO q HS   > Continue:    > Modafinil 100 mg PO q AM    > Melatonin 3 mg PO q HS    > Swelling of both lower extremities    > Venous duplex ultrasound of both lower extremities (4/18/2017) showed no evidence of deep venous thrombosis detected in the veins visualized.    > Weakness / Numbness of the right lower extremity    > Rule out lumbar radiculopathy vs sciatic/femoral nerve irritation/compression due to right iliopsoas hematoma   > CT scan of the lumbar spine   > CT scan of the abdomen/pelvis   > Neurology consult (Dr. Jyothi Forte) called for evaluation and comanagement   > Spine surgery consult (Dr. Jessie Barillas) called for evaluation and comanagement     > Analgesia   > On 4/10/2017:    > Increased Gabapentin from 300 mg to 400 mg PO BID    > Increased Percocet 5/325 to Percocet 7.5/325 1 tab PO TID (8AM, 12PM, 4PM)    > Increased Percocet 5/325 to Percocet 7.5/325 1 tab PO q 4 hr PRN for pain level greater than 4/10 (from 8PM to 4AM only)    > On 4/11/2017, added Cyclobenzaprine 5 mg PO q 8 hr   > On 4/14/2017:    > Discontinued Cyclobenzaprine 5 mg PO q 8 hr    > Increased Gabapentin from 400 mg to 600 mg PO BID   > Continue:    > Acetaminophen 650 mg PO q 4 hr PRN for pain level less than 5/10    > Decrease Gabapentin from 600 mg to 400 mg PO BID    > Percocet 7.5/325 1 tab PO TID (8AM, 12PM, 4PM)    > Percocet 7.5/325 1 tab PO q 4 hr PRN for pain level greater than 4/10 (from 8PM to 4AM only)    > Warm compress to right thigh/hip PRN      8. Patient's progress in rehabilitation and medical issues discussed with the patient. All questions answered to the best of my ability. Care plan discussed with patient,  and nurse. 9. Discharge Planning:   > Patient was scheduled for discharge today but due significant functional decline noted when trying to transfer to car, will cancel discharge order, will pursue work-up and will consult Neurology and Neurosurgery   > Home health physical therapy and occupational therapy   > F/U: 1.  PCP (Dr. Shital Roe)    2. Cardiology (Dr. Jose Roberto Younger)      Signed:    Niranjan Adrian MD    April 19, 2017       ADDENDUM:    CT scan of the abdomen and pelvis (4/19/2017) showed the right psoas muscle has increased in size compared with 4/2/2017 CT, now 6 cm diameter compared with 4.5 cm previously. Similar extension to the iliopsoas. Heterogeneous density, presumed psoas muscle hematoma. Increase in size suggests continued or interval hemorrhage. As described on dedicated CT lumbar spine component of the psoas hematoma closely associated with right L2/L3 neural foramen. May be medial displacement of muscle by the trauma. Some component of central canal hemorrhage possible. Large left renal cyst is stable. Improved aeration at the lung bases. Some persistent right lung base atelectasis. CT scan of the lumbar spine (4/19/2017) showed that compared with 4/2/2017 there is been an interval increase in the size of the right iliopsoas muscle. Measured 4.5 cm diameter on the previous CT, currently  5.9 x 6.3 cm. Contents of the psoas muscle difficult to assess with noncontrast only CT. Continued increase in size suggests persistent or recurrent bleeding in the setting of presumed hematoma. Soft tissue density similar to the psoas muscle appears to extend medially into the right L2/L3 foramen. Effacement of perineural fat within the left and right L2/3 and to lesser extent L1/L2 neural foramina. Also effaced dorsal  epidural fat at these levels. There is no vertebral body fracture identified. The loss of fat planes in the L1/2 and L2/3 foramina and dorsal epidural space raises the possibility of a component of central canal (epidural) hemorrhage. Unless the permanent pacing device is MR compatible, MRI not option for evaluating the central canal. Suggest CT lumbar spine with IV contrast to attempt to separate tissue planes, perhaps better evaluate central spinal canal. There are degenerative changes in the lower lumbar spine with disc bulging,  but does not appear to be a critical central canal or foraminal stenosis at  these levels.     Plan:  > Spoke with General Surgery (Dr. Madina García, Sr.) regarding possible drainage of the right iliopsoas hematoma and he recommended to call Interventional Radiology for IR-guided drainage  > Spoke with Interventional Radiology (Dr. Abad Craft) regarding possible drainage of the right iliopsoas hematoma and he said there is now way to tell if the increase in diameter in the right iliopsoas muscle was recent or old compared to the 4/2/2017 CT scan. He recommended to check Pt/INR and to monitor Hgb/Hct but no urgent drainage needed. He recommended to call a consult to Interventional Radiology again in the AM so that someone can review the images as he is not in front of the computer.     Signed:    Nasim Sow MD    April 19, 2017     6:03 PM

## 2017-04-19 NOTE — ROUTINE PROCESS
0800 Pt. Awake sitting up in chair reported to be ready for discharge home. PCP called and arranged for appt. At May 4. Personal touch was called and stated will follow up.  0930 Pt. Sitting up in chair eating breakfast.  1030 Pt. Verbalized understanding of discharge instructions. Family members in room awaiting for transport to home. 1200 Pt. Transferred to Physical therapy  For training on transfers. 1300 Pt. Found by [facility] Physical therapist to showing signs of weakness and notified Dr. Rasheed Medina planned for RRT . 1315 RRT canceled due to pt. Being hemodynamically stable . Plan for further observation and hold on discharge per Dr. Rasheed Medina. 1430  PT. Transferred to CT for CT of abdomen and spine as ordered by Dr. Rasheed Medina. 1450 Pt. Very anxious c/o of severe pain 10 in abdomen and lower ext. Dr. Rasheed Medina was notified and ordered IV Lorazepam 2mg, IV dilaudid  1 mg and IV  Benadryl 12.5mg  1600 Pt. Arrived back from CT resting quietly no complaints. 1800 Pt. Sitting up in bed eating dinner. Family members in room.

## 2017-04-20 ENCOUNTER — HOSPITAL ENCOUNTER (INPATIENT)
Age: 67
LOS: 35 days | Discharge: NURSING, REHAB OR LONG-TERM CARE | DRG: 853 | End: 2017-05-25
Attending: HOSPITALIST | Admitting: HOSPITALIST
Payer: COMMERCIAL

## 2017-04-20 ENCOUNTER — APPOINTMENT (OUTPATIENT)
Dept: CT IMAGING | Age: 67
DRG: 853 | End: 2017-04-20
Attending: RADIOLOGY
Payer: COMMERCIAL

## 2017-04-20 ENCOUNTER — APPOINTMENT (OUTPATIENT)
Dept: GENERAL RADIOLOGY | Age: 67
DRG: 853 | End: 2017-04-20
Attending: FAMILY MEDICINE
Payer: COMMERCIAL

## 2017-04-20 ENCOUNTER — APPOINTMENT (OUTPATIENT)
Dept: CT IMAGING | Age: 67
DRG: 853 | End: 2017-04-20
Attending: PSYCHIATRY & NEUROLOGY
Payer: COMMERCIAL

## 2017-04-20 VITALS
HEART RATE: 94 BPM | RESPIRATION RATE: 20 BRPM | DIASTOLIC BLOOD PRESSURE: 66 MMHG | WEIGHT: 233 LBS | HEIGHT: 67 IN | BODY MASS INDEX: 36.57 KG/M2 | TEMPERATURE: 100.7 F | OXYGEN SATURATION: 96 % | SYSTOLIC BLOOD PRESSURE: 121 MMHG

## 2017-04-20 DIAGNOSIS — E11.40 TYPE 2 DIABETES MELLITUS WITH DIABETIC NEUROPATHY, WITH LONG-TERM CURRENT USE OF INSULIN (HCC): Chronic | ICD-10-CM

## 2017-04-20 DIAGNOSIS — Z79.4 TYPE 2 DIABETES MELLITUS WITH DIABETIC NEUROPATHY, WITH LONG-TERM CURRENT USE OF INSULIN (HCC): Chronic | ICD-10-CM

## 2017-04-20 PROBLEM — A41.9 SEPSIS (HCC): Status: ACTIVE | Noted: 2017-04-20

## 2017-04-20 PROBLEM — G82.20 ACUTE PARAPLEGIA (HCC): Status: ACTIVE | Noted: 2017-04-20

## 2017-04-20 LAB
ANION GAP BLD CALC-SCNC: 9 MMOL/L (ref 3–18)
APPEARANCE UR: ABNORMAL
ATRIAL RATE: 114 BPM
BACTERIA URNS QL MICRO: ABNORMAL /HPF
BASOPHILS # BLD AUTO: 0 K/UL (ref 0–0.06)
BASOPHILS # BLD: 0 % (ref 0–3)
BILIRUB UR QL: NEGATIVE
BUN SERPL-MCNC: 23 MG/DL (ref 7–18)
BUN/CREAT SERPL: 12 (ref 12–20)
CALCIUM SERPL-MCNC: 8.7 MG/DL (ref 8.5–10.1)
CALCULATED P AXIS, ECG09: 41 DEGREES
CALCULATED R AXIS, ECG10: 125 DEGREES
CALCULATED T AXIS, ECG11: 119 DEGREES
CHLORIDE SERPL-SCNC: 95 MMOL/L (ref 100–108)
CK MB CFR SERPL CALC: 0.3 % (ref 0–4)
CK MB SERPL-MCNC: 1.4 NG/ML (ref 5–25)
CK SERPL-CCNC: 433 U/L (ref 26–192)
CO2 SERPL-SCNC: 28 MMOL/L (ref 21–32)
COLOR UR: YELLOW
CREAT SERPL-MCNC: 1.9 MG/DL (ref 0.6–1.3)
DIAGNOSIS, 93000: NORMAL
DIFFERENTIAL METHOD BLD: ABNORMAL
EOSINOPHIL # BLD: 0 K/UL (ref 0–0.4)
EOSINOPHIL NFR BLD: 0 % (ref 0–5)
EPITH CASTS URNS QL MICRO: ABNORMAL /LPF (ref 0–5)
ERYTHROCYTE [DISTWIDTH] IN BLOOD BY AUTOMATED COUNT: 15 % (ref 11.6–14.5)
GLUCOSE BLD STRIP.AUTO-MCNC: 187 MG/DL (ref 70–110)
GLUCOSE BLD STRIP.AUTO-MCNC: 206 MG/DL (ref 70–110)
GLUCOSE BLD STRIP.AUTO-MCNC: 230 MG/DL (ref 70–110)
GLUCOSE SERPL-MCNC: 188 MG/DL (ref 74–99)
GLUCOSE UR STRIP.AUTO-MCNC: NEGATIVE MG/DL
HCT VFR BLD AUTO: 26.2 % (ref 35–45)
HCT VFR BLD AUTO: 26.4 % (ref 35–45)
HGB BLD-MCNC: 9 G/DL (ref 12–16)
HGB BLD-MCNC: 9 G/DL (ref 12–16)
HGB UR QL STRIP: NEGATIVE
INR PPP: 1.4 (ref 0.8–1.2)
KETONES UR QL STRIP.AUTO: ABNORMAL MG/DL
LACTATE BLD-SCNC: 1.2 MMOL/L (ref 0.4–2)
LACTATE SERPL-SCNC: 1.2 MMOL/L (ref 0.4–2)
LACTATE SERPL-SCNC: 1.7 MMOL/L (ref 0.4–2)
LEUKOCYTE ESTERASE UR QL STRIP.AUTO: ABNORMAL
LYMPHOCYTES # BLD AUTO: 3 % (ref 20–51)
LYMPHOCYTES # BLD: 0.6 K/UL (ref 0.8–3.5)
MCH RBC QN AUTO: 26.5 PG (ref 24–34)
MCHC RBC AUTO-ENTMCNC: 34.4 G/DL (ref 31–37)
MCV RBC AUTO: 77.1 FL (ref 74–97)
MONOCYTES # BLD: 1.5 K/UL (ref 0–1)
MONOCYTES NFR BLD AUTO: 7 % (ref 2–9)
NEUTS BAND NFR BLD MANUAL: 2 % (ref 0–5)
NEUTS SEG # BLD: 19.1 K/UL (ref 1.8–8)
NEUTS SEG NFR BLD AUTO: 88 % (ref 42–75)
NITRITE UR QL STRIP.AUTO: NEGATIVE
P-R INTERVAL, ECG05: 114 MS
PH UR STRIP: 5 [PH] (ref 5–8)
PLATELET # BLD AUTO: 209 K/UL (ref 135–420)
PLATELET COMMENTS,PCOM: ABNORMAL
PMV BLD AUTO: 8.6 FL (ref 9.2–11.8)
POTASSIUM SERPL-SCNC: 4.5 MMOL/L (ref 3.5–5.5)
PROT UR STRIP-MCNC: ABNORMAL MG/DL
PROTHROMBIN TIME: 16.3 SEC (ref 11.5–15.2)
Q-T INTERVAL, ECG07: 380 MS
QRS DURATION, ECG06: 174 MS
QTC CALCULATION (BEZET), ECG08: 523 MS
RBC # BLD AUTO: 3.4 M/UL (ref 4.2–5.3)
RBC MORPH BLD: ABNORMAL
SODIUM SERPL-SCNC: 132 MMOL/L (ref 136–145)
SP GR UR REFRACTOMETRY: 1.02 (ref 1–1.03)
TROPONIN I SERPL-MCNC: 0.69 NG/ML (ref 0–0.04)
TROPONIN I SERPL-MCNC: 1.67 NG/ML (ref 0–0.04)
TSH SERPL DL<=0.05 MIU/L-ACNC: 0.51 UIU/ML (ref 0.36–3.74)
UROBILINOGEN UR QL STRIP.AUTO: 0.2 EU/DL (ref 0.2–1)
VENTRICULAR RATE, ECG03: 114 BPM
WBC # BLD AUTO: 21.2 K/UL (ref 4.6–13.2)
WBC URNS QL MICRO: ABNORMAL /HPF (ref 0–4)

## 2017-04-20 PROCEDURE — 74011250636 HC RX REV CODE- 250/636: Performed by: PHYSICIAN ASSISTANT

## 2017-04-20 PROCEDURE — 84443 ASSAY THYROID STIM HORMONE: CPT | Performed by: HOSPITALIST

## 2017-04-20 PROCEDURE — 74011000258 HC RX REV CODE- 258: Performed by: PHYSICIAN ASSISTANT

## 2017-04-20 PROCEDURE — 74011636637 HC RX REV CODE- 636/637: Performed by: HOSPITALIST

## 2017-04-20 PROCEDURE — P9047 ALBUMIN (HUMAN), 25%, 50ML: HCPCS | Performed by: PHYSICIAN ASSISTANT

## 2017-04-20 PROCEDURE — 74011636637 HC RX REV CODE- 636/637: Performed by: INTERNAL MEDICINE

## 2017-04-20 PROCEDURE — 74011250636 HC RX REV CODE- 250/636

## 2017-04-20 PROCEDURE — 65610000006 HC RM INTENSIVE CARE

## 2017-04-20 PROCEDURE — 87075 CULTR BACTERIA EXCEPT BLOOD: CPT | Performed by: RADIOLOGY

## 2017-04-20 PROCEDURE — 74011000250 HC RX REV CODE- 250: Performed by: INTERNAL MEDICINE

## 2017-04-20 PROCEDURE — 74011000250 HC RX REV CODE- 250: Performed by: HOSPITALIST

## 2017-04-20 PROCEDURE — C1729 CATH, DRAINAGE: HCPCS

## 2017-04-20 PROCEDURE — P9047 ALBUMIN (HUMAN), 25%, 50ML: HCPCS

## 2017-04-20 PROCEDURE — 74011000258 HC RX REV CODE- 258

## 2017-04-20 PROCEDURE — 0K9N30Z DRAINAGE OF RIGHT HIP MUSCLE WITH DRAINAGE DEVICE, PERCUTANEOUS APPROACH: ICD-10-PCS | Performed by: RADIOLOGY

## 2017-04-20 PROCEDURE — 74011250636 HC RX REV CODE- 250/636: Performed by: INTERNAL MEDICINE

## 2017-04-20 PROCEDURE — 49406 IMAGE CATH FLUID PERI/RETRO: CPT

## 2017-04-20 PROCEDURE — 74011250636 HC RX REV CODE- 250/636: Performed by: HOSPITALIST

## 2017-04-20 PROCEDURE — 74011000250 HC RX REV CODE- 250

## 2017-04-20 PROCEDURE — 74011000258 HC RX REV CODE- 258: Performed by: HOSPITALIST

## 2017-04-20 PROCEDURE — 74011250637 HC RX REV CODE- 250/637: Performed by: HOSPITALIST

## 2017-04-20 PROCEDURE — 83605 ASSAY OF LACTIC ACID: CPT | Performed by: INTERNAL MEDICINE

## 2017-04-20 PROCEDURE — 87077 CULTURE AEROBIC IDENTIFY: CPT | Performed by: RADIOLOGY

## 2017-04-20 PROCEDURE — 74011000258 HC RX REV CODE- 258: Performed by: INTERNAL MEDICINE

## 2017-04-20 PROCEDURE — 87070 CULTURE OTHR SPECIMN AEROBIC: CPT | Performed by: RADIOLOGY

## 2017-04-20 RX ORDER — ALLOPURINOL 100 MG/1
100 TABLET ORAL DAILY
Status: DISCONTINUED | OUTPATIENT
Start: 2017-04-21 | End: 2017-05-12

## 2017-04-20 RX ORDER — DIPHENHYDRAMINE HYDROCHLORIDE 50 MG/ML
25 INJECTION, SOLUTION INTRAMUSCULAR; INTRAVENOUS
Status: DISCONTINUED | OUTPATIENT
Start: 2017-04-20 | End: 2017-04-22

## 2017-04-20 RX ORDER — CARVEDILOL 6.25 MG/1
6.25 TABLET ORAL 2 TIMES DAILY WITH MEALS
Status: DISCONTINUED | OUTPATIENT
Start: 2017-04-20 | End: 2017-04-20

## 2017-04-20 RX ORDER — CLOTRIMAZOLE 10 MG/1
10 LOZENGE ORAL; TOPICAL
Status: DISCONTINUED | OUTPATIENT
Start: 2017-04-20 | End: 2017-05-11

## 2017-04-20 RX ORDER — DEXTROSE 50 % IN WATER (D50W) INTRAVENOUS SYRINGE
25-50 AS NEEDED
Status: DISCONTINUED | OUTPATIENT
Start: 2017-04-20 | End: 2017-05-25 | Stop reason: HOSPADM

## 2017-04-20 RX ORDER — MIDAZOLAM HYDROCHLORIDE 1 MG/ML
INJECTION, SOLUTION INTRAMUSCULAR; INTRAVENOUS
Status: DISCONTINUED
Start: 2017-04-20 | End: 2017-04-20 | Stop reason: CLARIF

## 2017-04-20 RX ORDER — LORAZEPAM 2 MG/ML
1 INJECTION INTRAMUSCULAR ONCE
Status: DISCONTINUED | OUTPATIENT
Start: 2017-04-20 | End: 2017-04-20

## 2017-04-20 RX ORDER — MAGNESIUM SULFATE 100 %
16 CRYSTALS MISCELLANEOUS AS NEEDED
Status: DISCONTINUED | OUTPATIENT
Start: 2017-04-20 | End: 2017-05-25 | Stop reason: HOSPADM

## 2017-04-20 RX ORDER — NOREPINEPHRINE BITARTRATE 1 MG/ML
INJECTION, SOLUTION INTRAVENOUS
Status: DISPENSED
Start: 2017-04-20 | End: 2017-04-21

## 2017-04-20 RX ORDER — GABAPENTIN 400 MG/1
400 CAPSULE ORAL 2 TIMES DAILY
Status: DISCONTINUED | OUTPATIENT
Start: 2017-04-20 | End: 2017-05-12

## 2017-04-20 RX ORDER — ALBUMIN HUMAN 250 G/1000ML
62.5 SOLUTION INTRAVENOUS ONCE
Status: COMPLETED | OUTPATIENT
Start: 2017-04-20 | End: 2017-04-22

## 2017-04-20 RX ORDER — INSULIN GLARGINE 100 [IU]/ML
14 INJECTION, SOLUTION SUBCUTANEOUS
Status: DISCONTINUED | OUTPATIENT
Start: 2017-04-20 | End: 2017-04-22

## 2017-04-20 RX ORDER — CLOTRIMAZOLE 10 MG/1
10 LOZENGE ORAL; TOPICAL
Status: DISCONTINUED | OUTPATIENT
Start: 2017-04-20 | End: 2017-04-20 | Stop reason: HOSPADM

## 2017-04-20 RX ORDER — DOCUSATE SODIUM 100 MG/1
100 CAPSULE, LIQUID FILLED ORAL
Status: DISCONTINUED | OUTPATIENT
Start: 2017-04-21 | End: 2017-05-25 | Stop reason: HOSPADM

## 2017-04-20 RX ORDER — DEXTROSE 50 % IN WATER (D50W) INTRAVENOUS SYRINGE
25-50 AS NEEDED
Status: DISCONTINUED | OUTPATIENT
Start: 2017-04-20 | End: 2017-04-21 | Stop reason: SDUPTHER

## 2017-04-20 RX ORDER — ALLOPURINOL 100 MG/1
100 TABLET ORAL DAILY
Status: DISCONTINUED | OUTPATIENT
Start: 2017-04-21 | End: 2017-04-20 | Stop reason: SDUPTHER

## 2017-04-20 RX ORDER — SODIUM CHLORIDE 900 MG/100ML
INJECTION INTRAVENOUS
Status: COMPLETED
Start: 2017-04-20 | End: 2017-04-20

## 2017-04-20 RX ORDER — HYDROMORPHONE HYDROCHLORIDE 2 MG/ML
1 INJECTION, SOLUTION INTRAMUSCULAR; INTRAVENOUS; SUBCUTANEOUS ONCE
Status: DISCONTINUED | OUTPATIENT
Start: 2017-04-20 | End: 2017-04-20

## 2017-04-20 RX ORDER — DILTIAZEM HYDROCHLORIDE 5 MG/ML
INJECTION INTRAVENOUS
Status: COMPLETED
Start: 2017-04-20 | End: 2017-04-20

## 2017-04-20 RX ORDER — NOREPINEPHRINE BITARTRATE/D5W 8 MG/250ML
2-16 PLASTIC BAG, INJECTION (ML) INTRAVENOUS
Status: DISCONTINUED | OUTPATIENT
Start: 2017-04-20 | End: 2017-04-21

## 2017-04-20 RX ORDER — OXYCODONE AND ACETAMINOPHEN 7.5; 325 MG/1; MG/1
1 TABLET ORAL
Status: DISCONTINUED | OUTPATIENT
Start: 2017-04-20 | End: 2017-05-25 | Stop reason: HOSPADM

## 2017-04-20 RX ORDER — INSULIN LISPRO 100 [IU]/ML
INJECTION, SOLUTION INTRAVENOUS; SUBCUTANEOUS EVERY 6 HOURS
Status: DISCONTINUED | OUTPATIENT
Start: 2017-04-20 | End: 2017-04-29

## 2017-04-20 RX ORDER — DIPHENHYDRAMINE HYDROCHLORIDE 50 MG/ML
25 INJECTION, SOLUTION INTRAMUSCULAR; INTRAVENOUS
Status: DISCONTINUED | OUTPATIENT
Start: 2017-04-20 | End: 2017-04-20 | Stop reason: HOSPADM

## 2017-04-20 RX ORDER — SODIUM CHLORIDE 0.9 % (FLUSH) 0.9 %
5-10 SYRINGE (ML) INJECTION AS NEEDED
Status: DISCONTINUED | OUTPATIENT
Start: 2017-04-20 | End: 2017-04-20 | Stop reason: HOSPADM

## 2017-04-20 RX ORDER — SODIUM CHLORIDE 9 MG/ML
75 INJECTION, SOLUTION INTRAVENOUS CONTINUOUS
Status: DISCONTINUED | OUTPATIENT
Start: 2017-04-20 | End: 2017-04-20 | Stop reason: HOSPADM

## 2017-04-20 RX ORDER — DIPHENHYDRAMINE HYDROCHLORIDE 50 MG/ML
25 INJECTION, SOLUTION INTRAMUSCULAR; INTRAVENOUS ONCE
Status: COMPLETED | OUTPATIENT
Start: 2017-04-20 | End: 2017-04-20

## 2017-04-20 RX ORDER — SODIUM CHLORIDE 9 MG/ML
500 INJECTION, SOLUTION INTRAVENOUS
Status: COMPLETED | OUTPATIENT
Start: 2017-04-20 | End: 2017-04-22

## 2017-04-20 RX ORDER — FOLIC ACID 1 MG/1
1 TABLET ORAL DAILY
Status: DISCONTINUED | OUTPATIENT
Start: 2017-04-21 | End: 2017-05-25 | Stop reason: HOSPADM

## 2017-04-20 RX ORDER — DEXAMETHASONE SODIUM PHOSPHATE 4 MG/ML
4 INJECTION, SOLUTION INTRA-ARTICULAR; INTRALESIONAL; INTRAMUSCULAR; INTRAVENOUS; SOFT TISSUE EVERY 6 HOURS
Status: DISCONTINUED | OUTPATIENT
Start: 2017-04-20 | End: 2017-04-23

## 2017-04-20 RX ORDER — ONDANSETRON 2 MG/ML
4 INJECTION INTRAMUSCULAR; INTRAVENOUS
Status: DISCONTINUED | OUTPATIENT
Start: 2017-04-20 | End: 2017-04-20 | Stop reason: HOSPADM

## 2017-04-20 RX ORDER — METRONIDAZOLE 500 MG/100ML
500 INJECTION, SOLUTION INTRAVENOUS EVERY 8 HOURS
Status: DISCONTINUED | OUTPATIENT
Start: 2017-04-20 | End: 2017-04-24

## 2017-04-20 RX ORDER — ALBUMIN HUMAN 50 G/1000ML
25 SOLUTION INTRAVENOUS ONCE
Status: DISCONTINUED | OUTPATIENT
Start: 2017-04-20 | End: 2017-04-20

## 2017-04-20 RX ORDER — FENTANYL CITRATE 50 UG/ML
INJECTION, SOLUTION INTRAMUSCULAR; INTRAVENOUS
Status: DISCONTINUED
Start: 2017-04-20 | End: 2017-04-20 | Stop reason: CLARIF

## 2017-04-20 RX ORDER — MELATONIN
2000 DAILY
Status: DISCONTINUED | OUTPATIENT
Start: 2017-04-21 | End: 2017-05-25 | Stop reason: HOSPADM

## 2017-04-20 RX ORDER — DIPHENHYDRAMINE HYDROCHLORIDE 50 MG/ML
25 INJECTION, SOLUTION INTRAMUSCULAR; INTRAVENOUS
Status: DISCONTINUED | OUTPATIENT
Start: 2017-04-20 | End: 2017-04-21 | Stop reason: SDUPTHER

## 2017-04-20 RX ORDER — PRAVASTATIN SODIUM 20 MG/1
40 TABLET ORAL
Status: DISCONTINUED | OUTPATIENT
Start: 2017-04-20 | End: 2017-05-25 | Stop reason: HOSPADM

## 2017-04-20 RX ORDER — SODIUM CHLORIDE 9 MG/ML
100 INJECTION, SOLUTION INTRAVENOUS CONTINUOUS
Status: DISCONTINUED | OUTPATIENT
Start: 2017-04-20 | End: 2017-04-23

## 2017-04-20 RX ORDER — ONDANSETRON 2 MG/ML
4 INJECTION INTRAMUSCULAR; INTRAVENOUS
Status: DISCONTINUED | OUTPATIENT
Start: 2017-04-20 | End: 2017-05-25 | Stop reason: HOSPADM

## 2017-04-20 RX ORDER — AMOXICILLIN 250 MG
2 CAPSULE ORAL
Status: DISCONTINUED | OUTPATIENT
Start: 2017-04-20 | End: 2017-05-25 | Stop reason: HOSPADM

## 2017-04-20 RX ORDER — DILTIAZEM HYDROCHLORIDE 5 MG/ML
10 INJECTION INTRAVENOUS ONCE
Status: COMPLETED | OUTPATIENT
Start: 2017-04-20 | End: 2017-04-20

## 2017-04-20 RX ORDER — INSULIN LISPRO 100 [IU]/ML
INJECTION, SOLUTION INTRAVENOUS; SUBCUTANEOUS EVERY 6 HOURS
Status: DISCONTINUED | OUTPATIENT
Start: 2017-04-20 | End: 2017-04-20 | Stop reason: HOSPADM

## 2017-04-20 RX ORDER — DIPHENHYDRAMINE HYDROCHLORIDE 50 MG/ML
INJECTION, SOLUTION INTRAMUSCULAR; INTRAVENOUS
Status: COMPLETED
Start: 2017-04-20 | End: 2017-04-20

## 2017-04-20 RX ORDER — ACETAMINOPHEN 500 MG
500 TABLET ORAL
Status: DISCONTINUED | OUTPATIENT
Start: 2017-04-20 | End: 2017-05-14

## 2017-04-20 RX ORDER — DIPHENHYDRAMINE HYDROCHLORIDE 50 MG/ML
12.5 INJECTION, SOLUTION INTRAMUSCULAR; INTRAVENOUS ONCE
Status: DISCONTINUED | OUTPATIENT
Start: 2017-04-20 | End: 2017-04-20

## 2017-04-20 RX ORDER — ALBUMIN HUMAN 250 G/1000ML
SOLUTION INTRAVENOUS
Status: COMPLETED
Start: 2017-04-20 | End: 2017-04-20

## 2017-04-20 RX ADMIN — VANCOMYCIN HYDROCHLORIDE 2000 MG: 10 INJECTION, POWDER, LYOPHILIZED, FOR SOLUTION INTRAVENOUS at 18:00

## 2017-04-20 RX ADMIN — INSULIN LISPRO 4 UNITS: 100 INJECTION, SOLUTION INTRAVENOUS; SUBCUTANEOUS at 08:49

## 2017-04-20 RX ADMIN — DIPHENHYDRAMINE HYDROCHLORIDE: 50 INJECTION INTRAMUSCULAR; INTRAVENOUS at 17:00

## 2017-04-20 RX ADMIN — CLOTRIMAZOLE 10 MG: 10 LOZENGE ORAL at 18:00

## 2017-04-20 RX ADMIN — BUMETANIDE 1 MG: 1 TABLET ORAL at 08:48

## 2017-04-20 RX ADMIN — ALBUMIN (HUMAN) 50 G: 0.25 INJECTION, SOLUTION INTRAVENOUS at 21:42

## 2017-04-20 RX ADMIN — DIPHENHYDRAMINE HYDROCHLORIDE 25 MG: 50 INJECTION INTRAMUSCULAR; INTRAVENOUS at 17:00

## 2017-04-20 RX ADMIN — OXYCODONE HYDROCHLORIDE AND ACETAMINOPHEN 1 TABLET: 7.5; 325 TABLET ORAL at 08:48

## 2017-04-20 RX ADMIN — NALOXEGOL OXALATE 25 MG: 25 TABLET, FILM COATED ORAL at 05:59

## 2017-04-20 RX ADMIN — SITAGLIPTIN 50 MG: 50 TABLET, FILM COATED ORAL at 08:49

## 2017-04-20 RX ADMIN — DEXAMETHASONE SODIUM PHOSPHATE 4 MG: 4 INJECTION, SOLUTION INTRA-ARTICULAR; INTRALESIONAL; INTRAMUSCULAR; INTRAVENOUS; SOFT TISSUE at 23:55

## 2017-04-20 RX ADMIN — SODIUM CHLORIDE: 900 INJECTION INTRAVENOUS at 17:00

## 2017-04-20 RX ADMIN — OXYCODONE HYDROCHLORIDE AND ACETAMINOPHEN 1 TABLET: 7.5; 325 TABLET ORAL at 12:59

## 2017-04-20 RX ADMIN — CARVEDILOL 6.25 MG: 6.25 TABLET, FILM COATED ORAL at 17:00

## 2017-04-20 RX ADMIN — ALLOPURINOL 100 MG: 100 TABLET ORAL at 08:49

## 2017-04-20 RX ADMIN — OXYCODONE HYDROCHLORIDE AND ACETAMINOPHEN 1 TABLET: 7.5; 325 TABLET ORAL at 16:54

## 2017-04-20 RX ADMIN — CLOTRIMAZOLE 10 MG: 10 LOZENGE ORAL at 22:14

## 2017-04-20 RX ADMIN — ALBUMIN (HUMAN) 12.5 G: 0.25 INJECTION, SOLUTION INTRAVENOUS at 22:44

## 2017-04-20 RX ADMIN — CARVEDILOL 6.25 MG: 3.12 TABLET, FILM COATED ORAL at 08:48

## 2017-04-20 RX ADMIN — LACTOBACILLUS TAB 2 TABLET: TAB at 08:48

## 2017-04-20 RX ADMIN — DIPHENHYDRAMINE HYDROCHLORIDE 25 MG: 50 INJECTION INTRAMUSCULAR; INTRAVENOUS at 16:00

## 2017-04-20 RX ADMIN — FOLIC ACID 1 MG: 1 TABLET ORAL at 08:49

## 2017-04-20 RX ADMIN — INSULIN LISPRO 4 UNITS: 100 INJECTION, SOLUTION INTRAVENOUS; SUBCUTANEOUS at 22:20

## 2017-04-20 RX ADMIN — SODIUM CHLORIDE 15 MG/HR: 900 INJECTION, SOLUTION INTRAVENOUS at 16:45

## 2017-04-20 RX ADMIN — SODIUM CHLORIDE 125 ML/HR: 900 INJECTION, SOLUTION INTRAVENOUS at 17:00

## 2017-04-20 RX ADMIN — DILTIAZEM HYDROCHLORIDE: 5 INJECTION INTRAVENOUS at 17:00

## 2017-04-20 RX ADMIN — SODIUM CHLORIDE 500 ML: 900 INJECTION, SOLUTION INTRAVENOUS at 20:00

## 2017-04-20 RX ADMIN — INSULIN GLARGINE 14 UNITS: 100 INJECTION, SOLUTION SUBCUTANEOUS at 22:28

## 2017-04-20 RX ADMIN — DEXAMETHASONE SODIUM PHOSPHATE 4 MG: 4 INJECTION, SOLUTION INTRA-ARTICULAR; INTRALESIONAL; INTRAMUSCULAR; INTRAVENOUS; SOFT TISSUE at 19:00

## 2017-04-20 RX ADMIN — ACETAMINOPHEN 500 MG: 500 TABLET, COATED ORAL at 16:30

## 2017-04-20 RX ADMIN — Medication 2 TABLET: at 18:00

## 2017-04-20 RX ADMIN — GABAPENTIN 400 MG: 400 CAPSULE ORAL at 08:48

## 2017-04-20 RX ADMIN — GABAPENTIN 400 MG: 400 CAPSULE ORAL at 18:00

## 2017-04-20 RX ADMIN — IOPAMIDOL 60 ML: 612 INJECTION, SOLUTION INTRAVENOUS at 14:03

## 2017-04-20 RX ADMIN — SODIUM CHLORIDE 500 ML: 900 INJECTION, SOLUTION INTRAVENOUS at 10:00

## 2017-04-20 RX ADMIN — VITAMIN D, TAB 1000IU (100/BT) 2000 UNITS: 25 TAB at 08:48

## 2017-04-20 RX ADMIN — VASOPRESSIN 0.03 UNITS/MIN: 20 INJECTION INTRAVENOUS at 23:00

## 2017-04-20 RX ADMIN — PRAVASTATIN SODIUM 40 MG: 20 TABLET ORAL at 22:14

## 2017-04-20 RX ADMIN — DILTIAZEM HYDROCHLORIDE 10 MG: 5 INJECTION INTRAVENOUS at 17:00

## 2017-04-20 RX ADMIN — CEFEPIME 2 G: 2 INJECTION, POWDER, FOR SOLUTION INTRAVENOUS at 17:00

## 2017-04-20 RX ADMIN — DOCUSATE SODIUM 100 MG: 100 CAPSULE, LIQUID FILLED ORAL at 08:48

## 2017-04-20 RX ADMIN — ACETAMINOPHEN 650 MG: 325 TABLET ORAL at 09:15

## 2017-04-20 RX ADMIN — Medication 8 MCG/MIN: at 22:50

## 2017-04-20 RX ADMIN — Medication 4 MCG/MIN: at 19:00

## 2017-04-20 RX ADMIN — METRONIDAZOLE 500 MG: 500 INJECTION, SOLUTION INTRAVENOUS at 21:53

## 2017-04-20 RX ADMIN — MODAFINIL 100 MG: 100 TABLET ORAL at 08:49

## 2017-04-20 NOTE — PROGRESS NOTES
responded to Rapid Response for  Amanda Garcia, who is a 77 y.o.,female,     The  provided the following Interventions:  Provided crisis spiritual care and support. Chart reviewed. The following outcomes were achieved:  Provided support for patient and spouse. Assessment:  There are no further spiritual or Yazdanism issues which require intervention at this time. Plan:  Chaplains will continue to follow and will provide spiritual care as needed.  recommends bedside caregivers page  on duty if patient or family shows signs of acute spiritual or emotional distress.        [id number] Certified [address] [place] [address]   [phone]

## 2017-04-20 NOTE — CONSULTS
Clarence Castillo is a 77 y.o., right handed female, with an established history of hypertension, complete heart block with permanent pacemaker placed, diabetes mellitus, diabetic peripheral neuropathy, obesity, who was initially admitted to the hospital for weakness and pain in the left leg and hip region. She was initially thought to have had pyelonephritis treated as an outpatient. Apparently the pain returned worse admitted once again to the emergency department early April and found to have a psoas mass on the right side. This was thought to be secondary to hematoma consequence of having a supratherapeutic INR. This was felt to be a right iliopsoas intramuscular hematoma. She was treated by reversal of her anticoagulation and given antibiotics. She did not have this drained. It was noted that she was having urinary retention and indwelling Krueger catheter was placed at one point. I do not know what the residual volume was. She has since been getting rehabilitation. She has been on the rehab unit for approximately 2 weeks and has been improving in her ambulatory ability. Yesterday while they were trying to discharge her she noted increasing burning of the right leg. There was no overt weakness of the leg worse than she had had from just her general debility. At that this point that a neurology consultation was requested. She was seen this morning by Dr. Pawel Reagan after a CT of the lumbar spine was obtained showing enlargement of the right iliopsoas mass. He found the patient to be moving the left leg well and not moving the right leg normally with some generalized weakness at about 2/5 documented by him. I have since seen the patient then at this time was seeing her at about 12 noon. She has since had a Krueger catheter placed the exact residual volume is unknown to me. She also was noted to spike a temperature up to 102 Fahrenheit.   A sepsis workup has been arranged by her primary care team.  The patient states that she has been unable to move her leg since sometime a couple hours ago. I have been asked at this time to see her. She has some back pain but it is not any worse than it has been over the past several weeks. Social History; patient is  lives with her . She denies tobacco alcohol or illicit drug use. She is disabled. Family History; mother is alive. Father is  from cancer.     Current Facility-Administered Medications   Medication Dose Route Frequency Provider Last Rate Last Dose    sodium chloride (NS) flush 5-10 mL  5-10 mL IntraVENous PRN Kristine Felipe MD        [facility]) tablet 1 mg  1 mg Oral BID Kristine Felipe MD   1 mg at 17 0848    gabapentin (NEURONTIN) capsule 400 mg  400 mg Oral BID Kristine Felipe MD   400 mg at 17 0848    pravastatin (PRAVACHOL) tablet 40 mg  40 mg Oral QHS Kristine Felipe MD   40 mg at 17    cholecalciferol (VITAMIN D3) tablet 2,000 Units  2,000 Units Oral DAILY Kristine Felipe MD   2,000 Units at 1748    insulin glargine (LANTUS) injection 15 Units  15 Units SubCUTAneous QHS Kristine Felipe MD   15 Units at 17    modafinil (PROVIGIL) tablet 100 mg  100 mg Oral DAILY Kristine Felipe MD   100 mg at 17 0849    melatonin tablet 3 mg  3 mg Oral QHS Kristine Felipe MD   3 mg at 17    SITagliptin (JANUVIA) tablet 50 mg  50 mg Oral DAILY Kristine Felipe MD   50 mg at 17 0849    naloxegol (MOVANTIK) tablet 25 mg  25 mg Oral ACB Kristine Felipe MD   25 mg at 17 0559    carvedilol (COREG) tablet 6.25 mg  6.25 mg Oral BID WITH MEALS Kristine Felipe MD   6.25 mg at 17 0848    oxyCODONE-acetaminophen (PERCOCET 7.5) 7.5-325 mg per tablet 1 Tab  1 Tab Oral TID Kristine Felipe MD   1 Tab at 17 0848    oxyCODONE-acetaminophen (PERCOCET 7.5) 7.5-325 mg per tablet 1 Tab  1 Tab Oral Q4H PRN Kristine Felipe MD   1 Tab at 17 0401    ondansetron hcl (ZOFRAN) tablet 4 mg  4 mg Oral TID PRN Kermit Dinh MD   4 mg at 04/15/17 1559    allopurinol (ZYLOPRIM) tablet 100 mg  100 mg Oral DAILY Kermit Dinh MD   100 mg at 04/20/17 0849    docusate sodium (COLACE) capsule 100 mg  100 mg Oral DAILY AFTER Pia Garcia MD   100 mg at 04/20/17 0848    senna-docusate (PERICOLACE) 8.6-50 mg per tablet 2 Tab  2 Tab Oral PCD Kermit Dinh MD   2 Tab at 04/19/17 1837    acetaminophen (TYLENOL) tablet 650 mg  650 mg Oral Q4H PRN Kermit Dinh MD   650 mg at 04/20/17 0915    bisacodyl (DULCOLAX) tablet 10 mg  10 mg Oral Q48H PRN Kermit Dinh MD   10 mg at 04/11/17 1345    insulin lispro (HUMALOG) injection   SubCUTAneous Pal Beyer MD   4 Units at 85/98/08 4786    folic acid (FOLVITE) tablet 1 mg  1 mg Oral DAILY Kermit Dinh MD   1 mg at 04/20/17 0849    glucose chewable tablet 16 g  4 Tab Oral PRN Kermit Dinh MD        glucagon [facility]) injection 1 mg  1 mg IntraMUSCular PRN Kermit Dinh MD        dextrose (D50W) injection syrg 12.5-25 g  25-50 mL IntraVENous PRN Kermit Dinh MD        Lactobacillus [facility]) tablet 2 Tab  2 Tab Oral BID Kermit Dinh MD   2 Tab at 04/20/17 0848       Past Medical History:   Diagnosis Date    Benign hypertensive heart disease with systolic CHF, NYHA class 2 ([address] 9/5/2012    Biventricular implantable cardioverter-defibrillator in situ 04/28/2005    Upgraded to BiV AICD; gen change 4/2008; pocket revision 10/2009; Abdominal - done on 8/22/2012 by Dr. Liu Other Cardiac cath 08/15/1996    Patent coronaries. Elev LVEDP. EF 50-55%.  Cardiac echocardiogram 06/23/2015    Ltd study. EF 45-50%. Mild, diffuse hypk. Severe apical hypk. No mass or thrombus was clearly identified, although imaging was suboptimal.      Cardiac nuclear imaging test 06/19/2015    Fixed distal apical, distal septal defect more likely due to RV pacing than prior infarct. No ischemia. EF 46%. RWMA c/w RV pacing. Nondiagnostic EKG on pharm stress test.      Cardiovascular lower extremity venous duplex 09/04/2012    Acute, non-occlusive DVT in CFV on right. No DVT on left. No superficial thrombosis bilaterally.  Cardiovascular upper extremity venous duplex 08/27/2012    DVT in axillary vein on left. Left subclavian was not visualized.     Chronic anemia 9/5/2012    Chronic systolic heart failure (HCC)     Decreased calculated glomerular filtration rate (GFR) 3/30/2017    Calculated GFR equivalent to that of CKD stage 3 = 30-59 ml/min    Diabetic neuropathy associated with type 2 diabetes mellitus ([address] 6/28/2011    Difficult airway for intubation 08/22/2012    see anesthesia airway note    Dyslipidemia 6/28/2011    Gout     History of complete heart block 6/28/2011    History of Coumadin therapy     Anticoagulation for DVT of the LUE; Discontinued on 3/30/2017    History of deep venous thrombosis 9/5/2012    Left upper extremity    History of pyelonephritis 3/30/2017    Left bundle branch block (LBBB) on electrocardiogram 6/28/2011    Nonischemic cardiomyopathy ([address] 6/28/2011    Obesity (BMI 35.0-39.9 without comorbidity) ([address] 3/13/2017    Obstructive sleep apnea on CPAP 2/7/2012    Psoas hematoma, right, secondary to anticoagulant therapy 3/30/2017    Type 2 diabetes mellitus with diabetic neuropathy ([address] 6/28/2011       Past Surgical History:   Procedure Laterality Date    HX CARPAL TUNNEL RELEASE  4/07    right     HX CHOLECYSTECTOMY  1994    HX HYSTERECTOMY  1973    HX OTHER SURGICAL  6/11/2012    AICD revision    HX PACEMAKER  4/28/2005    [employer] AICD       Allergies   Allergen Reactions    Vancomycin Itching    Ampicillin Itching    Bactrim [Sulfamethoxazole-Trimethoprim] Unknown (comments)    Blueberry Swelling     Causes throat swelling    Ciprofloxacin Itching    Codeine Other (comments)     Jumpy feeling    Crestor [Rosuvastatin] Itching    Darvocet A500 [Propoxyphene N-Acetaminophen] Itching    Demerol [Meperidine] Itching    Levaquin [Levofloxacin] Itching    Lipitor [Atorvastatin] Myalgia    Magnesium Oxide Itching     nausea    Minocin [Minocycline] Unknown (comments)    Pcn [Penicillins] Itching    Pravachol [Pravastatin] Swelling     Swelling in mouth     Sulfa (Sulfonamide Antibiotics) Itching    Ultracet [Tramadol-Acetaminophen] Itching    Vicodin [Hydrocodone-Acetaminophen] Unknown (comments)    Vytorin 10-10 [Ezetimibe-Simvastatin] Myalgia    Percodan [Oxycodone Hcl-Oxycodone-Asa] Itching       Patient Active Problem List   Diagnosis Code    Nonischemic cardiomyopathy (HCC) I42.8    History of complete heart block Z86.79    Biventricular implantable cardioverter-defibrillator in situ Z95.810    Left bundle branch block (LBBB) on electrocardiogram I44.7    Type 2 diabetes mellitus with diabetic neuropathy ([facility]) E11.40    Dyslipidemia E78.5    Diabetic neuropathy associated with type 2 diabetes mellitus ([facility]) E11.40    Obstructive sleep apnea on CPAP G47.33, Z99.89    AICD generator infection [address] [address] T82. 7XXA    Difficult airway for intubation T88. 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 1XXA    Generalized weakness R53.1    Impaired mobility and ADLs Z74.09    History of Coumadin therapy Z79.01    Gout M10.9         Review of Systems:   As above otherwise 11 point review of systems negative including;   Constitutional no fever or chills  Skin denies rash or itching  HENT  Denies tinnitus, hearing lose  Eyes denies diplopia vision lose  Respiratory denies shortness of breath  Cardiovascular denies chest pain, dyspnea on exertion  Gastrointestinal denies nausea, vomiting, diarrhea, constipation  Genitourinary denies incontinence  Musculoskeletal denies joint pain or swelling  Endocrine denies weight change  Hematology denies easy bruising or bleeding   Neurological as above in HPI      PHYSICAL EXAMINATION:      VITAL SIGNS:    Visit Vitals    /66 (BP 1 Location: Left arm, BP Patient Position: At rest)    Pulse 94    Temp (!) 100.7 °F (38.2 °C)    Resp 20    Ht 5' 7\" (1.702 m)    Wt 105.7 kg (233 lb)    SpO2 96%    Breastfeeding No    BMI 36.49 kg/m2       GENERAL: The patient is well developed, well nourished, and in some mild  distress. EXTREMITIES: No clubbing, cyanosis, or edema is identified. Pulses 2+ and symmetrical.  Muscle tone is depressed in both lower extremities. HEAD:   Ear, nose, and throat appear to be without trauma. The patient is normocephalic. NEUROLOGIC EXAMINATION    MENTAL STATUS: The patient is awake, alert, and oriented x 4. Fund of knowledge is adequate. Speech is fluent and memory appears to be intact, both long and short term. CRANIAL NERVES: II  Visual fields are full to confrontation. Funduscopic examination reveals flat disks bilaterally. Pupils are both 3 mm and briskly reactive to light and accommodation. III, IV, VI  Extraocular movements are intact and there is no nystagmus. V  Facial sensation is intact to pinprick and light touch. VII  Face is symmetrical.   VIII - Hearing is present. IX, X, [address] rises symmetrically. Gag is present. Tongue is in the midline. XI - Shoulder shrugging and head turning intact  MOTOR:  The patient is plegic in both lower extremities.   She has no movement whatsoever in her legs which would include hip flexion extension of flexion at the knee extension or flexion at the ankles. Fine finger movements are symmetrical.    Tone is depressed in both lower limbs. Sensory examination is depressed to pinprick to about the L1 or L2 level bilaterally in both lower extremities. Reflexes are flaccid in the lower extremities and she has mute plantar responses. Cerebellar examination reveals no gross ataxia or dysmetria. Gait is not tested at this time. Final result (Exam End: 4/19/2017  3:40 PM) Open    Study Result   CT SCAN LUMBAR SPINE WITHOUT CONTRAST      HISTORY: Possible lumbar radiculopathy affecting right lower extremity secondary  to the iliopsoas muscle hematoma.     COMPARISON: CT abdomen pelvis 4/19/2017, 4/2/2017.     FINDINGS:      Sagittal reformatted images show normal alignment of the lumbar spine. Vertebral body heights are maintained throughout. No findings of acute or  chronic fracture. No focal suspicious marrow density in the lumbar spine. Small hyperdense lesion  in the central L2 vertebral body is unchanged, likely benign bone island. As on the previous CT abdomen and pelvis from 4/2/2017 there is asymmetric  enlargement of the right psoas muscle. Muscle is larger than on the previous CT,  measures up to 6.3 x 6.0 cm in the axial plane, increased from 4.8 x 4.8 cm  previously. Difficult to fully assess the source of psoas enlargement on  noncontrast only exam. No well-defined mass lesion visible without contrast.  On axial series 2 image 60 the abnormal right iliopsoas muscle protrudes  medially towards the right L2/L3 foramen. No well-defined fat plane visible  between the foramen and the psoas muscle. Grossly the contents of the central  spinal canal have similar density to the psoas muscles, but limited details by  CT. There is no asymmetric soft tissue in the right L2/L3 foramen on the  3/16/2017 CT abdomen and pelvis.  No bony remodeling or destruction in the right  L2 or L3 pedicles.     Significantly limited evaluation of the central spinal canal on CT, further  limitations by decreased artifact from the patient's arms, not raised for the  exam.        T12/L1: Limited details. No large disc pathology visible. There does not appear  to be central canal or foraminal stenosis.     L1/L2: Limited details. No well-defined focal disc pathology. On axial image 50  is effacement of the dorsal epidural fat, new finding compared to 3/16/2017 CT. No foraminal stenosis.     L2/L3: Again, as described above, there is soft tissue density through the right  foramen, contiguous with the right iliopsoas muscle. Also loss of perineural fat  in the left foramen at this level compared to the previous CT. Contents of the  central canal relatively dense. The dorsal epidural fat visible at this level on  previous exams is not seen currently.     L3/L4: There is no focal disc pathology visible, but there does appear to be a  circumferential disc bulge. Dorsal epidural fat is again visible at this level. Perineural fat is visible bilaterally. Does not appear to be significant  foraminal stenosis     L4/L5: Circumferential disc bulge. Mild prominence of the ligamentum flavum and  dorsal epidural fat. Suspected mild central canal stenosis. No definite  foraminal narrowing.     L5/S1: Minimal posterior disc bulge. There does not appear to be any significant  central canal or foraminal narrowing.        IMPRESSION  IMPRESSION:     1. Compared with 4/2/2017 there is been an interval increase in the size of the  right iliopsoas muscle. Measured 4.5 cm diameter on the previous CT, currently  5.9 x 6.3 cm. Contents of the psoas muscle difficult to assess with noncontrast  only CT. Continued increase in size suggests persistent or recurrent bleeding in  the setting of presumed hematoma.     2. Soft tissue density similar to the psoas muscle appears to extend medially  into the right L2/L3 foramen. Effacement of perineural fat within the left and  right L2/3 and to lesser extent L1/L2 neural foramina. Also effaced dorsal  epidural fat at these levels. -There is no vertebral body fracture identified.  -The loss of fat planes in the L1/2 and L2/3 foramina and dorsal epidural space  raises the possibility of a component of central canal (epidural) hemorrhage.  -Unless the permanent pacing device is MR compatible, MRI not option for  evaluating the central canal.  -Suggest CT lumbar spine with IV contrast to attempt to separate tissue planes,  perhaps better evaluate central spinal canal.     3. There are degenerative changes in the lower lumbar spine with disc bulging,  but does not appear to be a critical central canal or foraminal stenosis at  these levels.            I have reviewed the above imagines myself. CBC:   Lab Results   Component Value Date/Time    WBC 21.2 04/20/2017 09:23 AM    RBC 3.40 04/20/2017 09:23 AM    HGB 9.0 04/20/2017 09:23 AM    HCT 26.2 04/20/2017 09:23 AM    PLATELET 134 12/45/7173 09:23 AM     BMP:   Lab Results   Component Value Date/Time    Glucose 188 04/20/2017 06:42 AM    Sodium 132 04/20/2017 06:42 AM    Potassium 4.5 04/20/2017 06:42 AM    Chloride 95 04/20/2017 06:42 AM    CO2 28 04/20/2017 06:42 AM    BUN 23 04/20/2017 06:42 AM    Creatinine 1.90 04/20/2017 06:42 AM    Calcium 8.7 04/20/2017 06:42 AM     CMP:   Lab Results   Component Value Date/Time    Glucose 188 04/20/2017 06:42 AM    Sodium 132 04/20/2017 06:42 AM    Potassium 4.5 04/20/2017 06:42 AM    Chloride 95 04/20/2017 06:42 AM    CO2 28 04/20/2017 06:42 AM    BUN 23 04/20/2017 06:42 AM    Creatinine 1.90 04/20/2017 06:42 AM    Calcium 8.7 04/20/2017 06:42 AM    Anion gap 9 04/20/2017 06:42 AM    BUN/Creatinine ratio 12 04/20/2017 06:42 AM    Alk.  phosphatase 120 03/31/2017 05:00 AM    Protein, total 6.9 03/31/2017 05:00 AM    Albumin 2.3 03/31/2017 05:00 AM    Globulin 4.6 03/31/2017 05:00 AM    A-G Ratio 0.5 03/31/2017 05:00 AM     Coagulation:   Lab Results   Component Value Date/Time    Prothrombin time 16.3 04/20/2017 06:42 AM    INR 1.4 04/20/2017 06:42 AM    aPTT 47.9 03/30/2017 05:35 PM     Cardiac markers:   Lab Results   Component Value Date/Time     04/20/2017 09:33 AM    CK-MB Index 0.3 04/20/2017 09:33 AM          Impression: Worsening right psoas hematoma now with paraparesis of both legs who has risk factors including psoas abcess and recent spiking of fever. She has a spinal cord epidural abcess till proven otherwise. She has recently spiked a temperature I cannot rule out that this lesion in her right iliopsoas muscle is infected. Plan: STAT CT thoracic spine. She can't have an MRI due to pacer/defibrillator. Infectious disease should be consulted. She needs to be moved back to the acute care setting urgently. I will try to contact spine surgery as soon as possible. Will follow. PLEASE NOTE:   This document has been produced using voice recognition software. Unrecognized errors in transcription may be present.

## 2017-04-20 NOTE — H&P
Preprocedure Assessment      Today 4/20/2017     Indication/Symptoms:   Caitlyn Estrada is a 77 y.o. Female here for right psoas hematoma drainage and drainage catheter placement. Patient is septic. Abscess is suspected. The H & P and/or progress notes and any available imaging were reviewed. The risks, indications and possible alternatives to the procedure, including doing nothing, were discussed and informed consent was obtained. Physical Exam:      Heart:   RRR   Lungs:   CTAB, no wheezes, rhonchi or rales. The patient is an appropriate candidate to undergo the planned procedure and sedation.     Lavelle Dye MD

## 2017-04-20 NOTE — CONSULTS
Consult Note    Patient: Marcella Finnegan               Sex: female          DOA: 4/20/2017         YOB: 1950      Age:  77 y.o.        LOS:  LOS: 0 days              HPI:     Marcella Finnegan is a 77 y.o. female who has been seen for right psoas hematoma drainage. Possible infection. Patient is septic. Past Medical History:   Diagnosis Date    Benign hypertensive heart disease with systolic CHF, NYHA class 2 ([facility] [address] 9/5/2012    Biventricular implantable cardioverter-defibrillator in situ 04/28/2005    Upgraded to BiV AICD; gen change 4/2008; pocket revision 10/2009; Abdominal - done on 8/22/2012 by Dr. Ashwin Lloyd Cardiac cath 08/15/1996    Patent coronaries. Elev LVEDP. EF 50-55%.  Cardiac echocardiogram 06/23/2015    Ltd study. EF 45-50%. Mild, diffuse hypk. Severe apical hypk. No mass or thrombus was clearly identified, although imaging was suboptimal.      Cardiac nuclear imaging test 06/19/2015    Fixed distal apical, distal septal defect more likely due to RV pacing than prior infarct. No ischemia. EF 46%. RWMA c/w RV pacing. Nondiagnostic EKG on pharm stress test.      Cardiovascular lower extremity venous duplex 09/04/2012    Acute, non-occlusive DVT in CFV on right. No DVT on left. No superficial thrombosis bilaterally.  Cardiovascular upper extremity venous duplex 08/27/2012    DVT in axillary vein on left. Left subclavian was not visualized.     Chronic anemia 9/5/2012    Chronic systolic heart failure (HCC)     Decreased calculated glomerular filtration rate (GFR) 3/30/2017    Calculated GFR equivalent to that of CKD stage 3 = 30-59 ml/min    Diabetic neuropathy associated with type 2 diabetes mellitus ([facility] [address] 6/28/2011    Difficult airway for intubation 08/22/2012    see anesthesia airway note    Dyslipidemia 6/28/2011    Gout     History of complete heart block 6/28/2011    History of Coumadin therapy     Anticoagulation for DVT of the LUE; Discontinued on 3/30/2017    History of deep venous thrombosis 9/5/2012    Left upper extremity    History of pyelonephritis 3/30/2017    Left bundle branch block (LBBB) on electrocardiogram 6/28/2011    Nonischemic cardiomyopathy ([facility] [address] 6/28/2011    Obesity (BMI 35.0-39.9 without comorbidity) ([facility] [address] 3/13/2017    Obstructive sleep apnea on CPAP 2/7/2012    Psoas hematoma, right, secondary to anticoagulant therapy 3/30/2017    Type 2 diabetes mellitus with diabetic neuropathy [facility] 6/28/2011       Past Surgical History:   Procedure Laterality Date    HX CARPAL TUNNEL RELEASE  4/07    right     HX CHOLECYSTECTOMY  1994    HX HYSTERECTOMY  1973    HX OTHER SURGICAL  6/11/2012    AICD revision    HX PACEMAKER  4/28/2005    [employer] AICD       Family History   Problem Relation Age of Onset    Cancer Father      Leukemia       Social History     Social History    Marital status:      Spouse name: N/A    Number of children: N/A    Years of education: N/A     Social History Main Topics    Smoking status: Never Smoker    Smokeless tobacco: Never Used    Alcohol use No    Drug use: No    Sexual activity: Yes     Partners: Male     Other Topics Concern    Not on file     Social History Narrative       Prior to Admission medications    Medication Sig Start Date End Date Taking? Authorizing Provider   gabapentin (NEURONTIN) 400 mg capsule Take 1 Cap by mouth two (2) times a day. Indications: NEUROPATHIC PAIN 4/19/17   Malaika Naidu MD   cholecalciferol (VITAMIN D3) 1,000 unit tablet Take 2 Tabs by mouth daily. Indications: PREVENTION OF VITAMIN D DEFICIENCY 4/19/17   Malaika Naidu MD   SITagliptin (JANUVIA) 50 mg tablet Take 50 mg by mouth daily. Indications: type 2 diabetes mellitus    Historical Provider   potassium chloride (KLOR-CON M20) 20 mEq tablet Take 20 mEq by mouth two (2) times a day.  Indications: HYPOKALEMIA PREVENTION    Historical Provider   ondansetron (ZOFRAN ODT) 4 mg disintegrating tablet Take 4 mg by mouth every eight (8) hours as needed for Nausea. Historical Provider   cyclobenzaprine (FLEXERIL) 10 mg tablet Take 10 mg by mouth as needed for Muscle Spasm(s). Indications: MUSCLE SPASM    Historical Provider   carvedilol (COREG) 6.25 mg tablet Take 1 Tab by mouth two (2) times daily (with meals). Indications: hypertension 4/18/17   Kandace Coleman MD   acetaminophen (TYLENOL) 325 mg tablet Take 2 Tabs by mouth every four (4) hours as needed (for fever or pain level less than 5/10). Indications: Fever, Pain 4/18/17   Kandace Coleman MD   allopurinol (ZYLOPRIM) 100 mg tablet Take 1 Tab by mouth daily. Indications: GOUT 4/18/17   Kandace Coleman MD   insulin glargine (LANTUS) 100 unit/mL injection 15 Units by SubCUTAneous route nightly. Indications: type 2 diabetes mellitus 4/18/17   Kandace Colmean MD   docusate sodium (COLACE) 100 mg capsule Take 1 Cap by mouth daily (after breakfast). Indications: Constipation 4/18/17   Kandace Coleman MD   senna-docusate (PERICOLACE) 8.6-50 mg per tablet Take 2 Tabs by mouth daily (after dinner). Indications: Constipation 4/18/17   Kandace Coleman MD   oxyCODONE-acetaminophen (PERCOCET 7.5) 7.5-325 mg per tablet Take 1 Tab by mouth every four (4) hours as needed (for pain level greater than 4/10). Max Daily Amount: 6 Tabs. Indications: Pain 4/18/17   Kandace Coleman MD   bumetanide [facility]) 1 mg tablet TAKE 1 TABLET TWICE A DAY 4/14/17   Angely Penn DO   pravastatin (PRAVACHOL) 40 mg tablet Take 40 mg by mouth nightly. Indications: DYSLIPIDEMIA    Historical Provider   ferrous sulfate 325 mg (65 mg iron) tablet Take 1 Tab by mouth two (2) times daily (with meals).  9/11/12   Kandace Coleman MD   insulin aspart (NOVOLOG) 100 unit/mL injection INITIATE INSULIN CORRECTIVE PROTOCOL (HR): Normal Insulin Sensitivity  For Blood Sugar (mg/dL) of:    Less than 150 =   0 units          150 -199 =   2 units 200 -249 =   4 units 250 -299 =   6 units 300 -349 =   8 units 350 and above =   10 units If 2 glucose readings are above 200 mg/dL 9/5/12   LUDMILA Osuna       Allergies   Allergen Reactions    Vancomycin Itching    Ampicillin Itching    Bactrim [Sulfamethoxazole-Trimethoprim] Unknown (comments)    Blueberry Swelling     Causes throat swelling    Ciprofloxacin Itching    Codeine Other (comments)     Jumpy feeling    Crestor [Rosuvastatin] Itching    Darvocet A500 [Propoxyphene N-Acetaminophen] Itching    Demerol [Meperidine] Itching    Levaquin [Levofloxacin] Itching    Lipitor [Atorvastatin] Myalgia    Magnesium Oxide Itching     nausea    Minocin [Minocycline] Unknown (comments)    Pcn [Penicillins] Itching    Pravachol [Pravastatin] Swelling     Swelling in mouth     Sulfa (Sulfonamide Antibiotics) Itching    Ultracet [Tramadol-Acetaminophen] Itching    Vicodin [Hydrocodone-Acetaminophen] Unknown (comments)    Vytorin 10-10 [Ezetimibe-Simvastatin] Myalgia    Percodan [Oxycodone Hcl-Oxycodone-Asa] Itching       Review of Systems  Pertinent items are noted in the History of Present Illness. Physical Exam:      There were no vitals taken for this visit. Physical Exam:  Physical exam not obtained due to patient factors. Labs Reviewed:  Lab results reviewed. For significant abnormal values and values requiring intervention, see assessment and plan.     Assessment/Plan     Principal Problem:    Acute paraplegia ([facility] [address] (4/20/2017)    Active Problems:    Type 2 diabetes mellitus with diabetic neuropathy ([facility] [address] (6/28/2011)      Benign hypertensive heart disease with systolic CHF, NYHA class 2 ([facility] [address] (9/5/2012)      Decreased calculated glomerular filtration rate (GFR) (3/30/2017)      Overview: Calculated GFR equivalent to that of CKD stage 3 = 30-59 ml/min      Iliopsoas muscle hematoma (3/30/2017)      Psoas hematoma, right, secondary to anticoagulant therapy (3/30/2017)      Impaired mobility and ADLs (3/30/2017)      History of Coumadin therapy () Overview: Anticoagulation for chronic atrial fibrillation; Discontinued on 3/30/2017      Sepsis ([address] (4/20/2017)        CT guided drainage of right psoas hematoma.      D/w Dr. Shahzad Alejo

## 2017-04-20 NOTE — PROGRESS NOTES
Called by Dr. Mely Hernandez that patient developed paraplegia later this morning after I had seen her. Evidently found to not be moving left leg, when they went to place perez catheter. Noted to be febrile. Patient seen after stat CT scan. Cannot have MRI as she has pacer. By my reading I did not see any acute changes in t or ls spine. Continue psoas collection. PE  Patient with shaking chills- looks sick- marked difference from this am.    C/o weakness - no back pain    Has change in sensory intensity at umbilicus. No evident motor function BLE ehl ta h q  No reflexes    Perez in place    Labs    Wbc 21K  H/h 9/26    Blood and urine cultures pending    AP    Septic appearance, dramatic sudden progression, concern for epidural abscess though difficult to determine level, no back pain, . Infected psoas collection, etc.    At this time need to treat sepsis and determine what needs to be done, if anything, to address neurologic deterioration.

## 2017-04-20 NOTE — CONSULTS
Infectious Disease Consultation Note    Requested by: Dr. Aakash Murillo, dr. Idania Hassan    Reason: sepsis, acute paraplegia    Current abx Prior abx         Lines:       Assessment :    77 y.o., right handed female, with an established history of hypertension, complete heart block with permanent pacemaker placed, diabetes mellitus, diabetic peripheral neuropathy, obesity, admitted to [facility] on 4/20/17. Clinical presentation concerning for sepsis secondary to infected right psoas hematoma/epidural abscess with neurological compromise ? cord compression. Alternatively patient could have rebleeding in the hematoma and venous thrombosis in the spinal cords or could be a rapidly progressing malignancy such as sarcoma. Significant chills and fevers would favor infectious etiology. Patient's management is complicated due to inability to obtain MRI (not pacemaker compatible - i confirmed with cardiology team) and multiple antibiotic allergies including penicillin, vancomycin. Discussed with patient. She denies anaphylaxis with pcn or vancomycin. Has tolerated cephalexin in the past. Hence, will use cefepime, vancomycin with close monitoring. Benefits and risks of this approach including rash, hives, swelling of throat, anaphylaxis, death explained to patient in details. She verbalized her understanding and wishes to proceed. Nursing staff was advised to monitor closely for allergy. Give antihistaminics & call me if any allergic reaction noted. Recommendations:    1. Start vancomycin, cefepime stat with benadryl premedication (discussed with nurse, pharmacist)  2. ?start decadron  3. Decision about surgical decompression per dr. Joycelyn Navarrete  4. F/u blood cultures  5. Needs close monitoring  6. Recommend to send tissue for histology, cultures if biopsy attempted    Thank you for consultation request. Above plan was discussed in details with patient, dr. Idania Hassan, dr. Karen Alvarez, tanner. All questions answered to their full satisfaction. Spent additional 35 minutes in management and evaluation of this patient. >50% time spent in counselling and coordination of care. . Please call me if any further questions or concerns. Will continue to participate in the care of this patient. HPI:    77 y.o., right handed female, with an established history of hypertension, complete heart block with permanent pacemaker placed, diabetes mellitus, diabetic peripheral neuropathy, obesity, admitted to [facility] on 4/20/17. Currently patient is very uncomfortable, having chills and unable to provide a detailed history. I obtained history from review of [facility] records, talking to dr. Stephanie Jamison, dr. Lauren Morales, patient. She was initially admitted to the hospital for weakness and pain in the left leg and hip region. She was initially thought to have had pyelonephritis treated as an outpatient. Apparently the pain returned worse admitted once again to the emergency department early April and found to have a psoas mass on the right side. This was thought to be secondary to hematoma consequence of having a supratherapeutic INR. This was felt to be a right iliopsoas intramuscular hematoma. She was treated by reversal of her anticoagulation and given antibiotics. She did not have this drained. It was noted that she was having urinary retention and indwelling Krueger catheter was placed at one point.      She was subsequently transferred to acute rehab. She had been on the rehab unit for approximately 2 weeks and had been improving in her ambulatory ability. Yesterday while they were trying to discharge her she noted increasing burning of the right leg. There was no overt weakness of the leg worse than she had had from just her general debility. At that this point that a neurology consultation was requested. She was seen this morning by Dr. Bryn Toth after a CT of the lumbar spine was obtained showing enlargement of the right iliopsoas mass.  He found the patient to be moving the left leg well and not moving the right leg normally with some generalized weakness at about 2/5 documented by him. Around 12 noon, she was noted to have inability to move her right leg. she also was noted to spike a temperature up to 102 Fahrenheit. Her wbc count was noted to be 20k. Spine surgery was reconsulted and she was determined to have acute paraplegia. I have been consulted for further recommendations. I had verbally recommended to start the patient on iv ceftriaxone, vancomycin with benadryl premedication. I discussed with pharmacy - the antibiotics were not administered since patient was transferred to medical ICU. Currently patient c/o significant chills. She has some lower back pain. Denies cp, sob, abdominal pain. Unable to move legs. Detailed ros not feasible. Past Medical History:   Diagnosis Date    Benign hypertensive heart disease with systolic CHF, NYHA class 2 ([facility] [address] 9/5/2012    Biventricular implantable cardioverter-defibrillator in situ 04/28/2005    Upgraded to BiV AICD; gen change 4/2008; pocket revision 10/2009; Abdominal - done on 8/22/2012 by Dr. Garth Contreras Cardiac cath 08/15/1996    Patent coronaries. Elev LVEDP. EF 50-55%.  Cardiac echocardiogram 06/23/2015    Ltd study. EF 45-50%. Mild, diffuse hypk. Severe apical hypk. No mass or thrombus was clearly identified, although imaging was suboptimal.      Cardiac nuclear imaging test 06/19/2015    Fixed distal apical, distal septal defect more likely due to RV pacing than prior infarct. No ischemia. EF 46%. RWMA c/w RV pacing. Nondiagnostic EKG on pharm stress test.      Cardiovascular lower extremity venous duplex 09/04/2012    Acute, non-occlusive DVT in CFV on right. No DVT on left. No superficial thrombosis bilaterally.  Cardiovascular upper extremity venous duplex 08/27/2012    DVT in axillary vein on left. Left subclavian was not visualized.     Chronic anemia 9/5/2012    Chronic systolic heart failure ([address]     Decreased calculated glomerular filtration rate (GFR) 3/30/2017    Calculated GFR equivalent to that of CKD stage 3 = 30-59 ml/min    Diabetic neuropathy associated with type 2 diabetes mellitus ([address] 6/28/2011    Difficult airway for intubation 08/22/2012    see anesthesia airway note    Dyslipidemia 6/28/2011    Gout     History of complete heart block 6/28/2011    History of Coumadin therapy     Anticoagulation for DVT of the LUE; Discontinued on 3/30/2017    History of deep venous thrombosis 9/5/2012    Left upper extremity    History of pyelonephritis 3/30/2017    Left bundle branch block (LBBB) on electrocardiogram 6/28/2011    Nonischemic cardiomyopathy ([address] 6/28/2011    Obesity (BMI 35.0-39.9 without comorbidity) ([address] 3/13/2017    Obstructive sleep apnea on CPAP 2/7/2012    Psoas hematoma, right, secondary to anticoagulant therapy 3/30/2017    Type 2 diabetes mellitus with diabetic neuropathy ([address] 6/28/2011       Past Surgical History:   Procedure Laterality Date    HX CARPAL TUNNEL RELEASE  4/07    right     HX CHOLECYSTECTOMY  1994    HX HYSTERECTOMY  1973    HX OTHER SURGICAL  6/11/2012    AICD revision    HX PACEMAKER  4/28/2005    [employer] AICD       Home Medication List    Details   gabapentin (NEURONTIN) 400 mg capsule Take 1 Cap by mouth two (2) times a day. Indications: NEUROPATHIC PAIN  Qty: 30 Cap, Refills: 0    Associated Diagnoses: Iliopsoas muscle hematoma, right, subsequent encounter      cholecalciferol (VITAMIN D3) 1,000 unit tablet Take 2 Tabs by mouth daily. Indications: PREVENTION OF VITAMIN D DEFICIENCY  Qty: 30 Tab, Refills: 0      SITagliptin (JANUVIA) 50 mg tablet Take 50 mg by mouth daily. Indications: type 2 diabetes mellitus      potassium chloride (KLOR-CON M20) 20 mEq tablet Take 20 mEq by mouth two (2) times a day.  Indications: HYPOKALEMIA PREVENTION      ondansetron (ZOFRAN ODT) 4 mg disintegrating tablet Take 4 mg by mouth every eight (8) hours as needed for Nausea. cyclobenzaprine (FLEXERIL) 10 mg tablet Take 10 mg by mouth as needed for Muscle Spasm(s). Indications: MUSCLE SPASM      carvedilol (COREG) 6.25 mg tablet Take 1 Tab by mouth two (2) times daily (with meals). Indications: hypertension  Qty: 30 Tab, Refills: 0    Associated Diagnoses: Benign hypertensive heart disease with systolic CHF, NYHA class 2 (HCC)      acetaminophen (TYLENOL) 325 mg tablet Take 2 Tabs by mouth every four (4) hours as needed (for fever or pain level less than 5/10). Indications: Fever, Pain  Qty: 30 Tab, Refills: 0    Associated Diagnoses: Iliopsoas muscle hematoma, right, subsequent encounter      allopurinol (ZYLOPRIM) 100 mg tablet Take 1 Tab by mouth daily. Indications: GOUT  Qty: 15 Tab, Refills: 0    Associated Diagnoses: Chronic gout, unspecified cause, unspecified site      insulin glargine (LANTUS) 100 unit/mL injection 15 Units by SubCUTAneous route nightly. Indications: type 2 diabetes mellitus  Qty: 1 Vial, Refills: 0    Associated Diagnoses: Type 2 diabetes mellitus with diabetic neuropathy, with long-term current use of insulin ([facility])      docusate sodium (COLACE) 100 mg capsule Take 1 Cap by mouth daily (after breakfast). Indications: Constipation  Qty: 15 Cap, Refills: 0      senna-docusate (PERICOLACE) 8.6-50 mg per tablet Take 2 Tabs by mouth daily (after dinner). Indications: Constipation  Qty: 30 Tab, Refills: 0      oxyCODONE-acetaminophen (PERCOCET 7.5) 7.5-325 mg per tablet Take 1 Tab by mouth every four (4) hours as needed (for pain level greater than 4/10). Max Daily Amount: 6 Tabs. Indications: Pain  Qty: 50 Tab, Refills: 0    Associated Diagnoses: Iliopsoas muscle hematoma, right, subsequent encounter      bumetanide (BUMEX) 1 mg tablet TAKE 1 TABLET TWICE A DAY  Qty: 180 Tab, Refills: 1      pravastatin (PRAVACHOL) 40 mg tablet Take 40 mg by mouth nightly.  Indications: DYSLIPIDEMIA      ferrous sulfate 325 mg (65 mg iron) tablet Take 1 Tab by mouth two (2) times daily (with meals). Qty: 30 Tab, Refills: 0      insulin aspart (NOVOLOG) 100 unit/mL injection INITIATE INSULIN CORRECTIVE PROTOCOL (HR): Normal Insulin Sensitivity  For Blood Sugar (mg/dL) of:    Less than 150 =   0 units          150 -199 =   2 units 200 -249 =   4 units 250 -299 =   6 units 300 -349 =   8 units 350 and above =   10 units If 2 glucose readings are above 200 mg/dL  Qty: 10 mL, Refills: 6             No current facility-administered medications for this encounter. Allergies: Vancomycin; Ampicillin; Bactrim [sulfamethoxazole-trimethoprim]; Blueberry; Ciprofloxacin; Codeine; Crestor [rosuvastatin]; Darvocet a500 [propoxyphene n-acetaminophen]; Demerol [meperidine]; Levaquin [levofloxacin]; Lipitor [atorvastatin]; Magnesium oxide; Minocin [minocycline]; Pcn [penicillins]; Pravachol [pravastatin]; Sulfa (sulfonamide antibiotics); Ultracet [tramadol-acetaminophen]; Vicodin [hydrocodone-acetaminophen]; Vytorin 10-10 [ezetimibe-simvastatin]; and Percodan [oxycodone hcl-oxycodone-asa]    Family History   Problem Relation Age of Onset    Cancer Father      Leukemia     Social History     Social History    Marital status:      Spouse name: N/A    Number of children: N/A    Years of education: N/A     Occupational History    Not on file. Social History Main Topics    Smoking status: Never Smoker    Smokeless tobacco: Never Used    Alcohol use No    Drug use: No    Sexual activity: Yes     Partners: Male     Other Topics Concern    Not on file     Social History Narrative     History   Smoking Status    Never Smoker   Smokeless Tobacco    Never Used        Temp (24hrs), Av.2 °F (37.9 °C), Min:97.8 °F (36.6 °C), Max:101.9 °F (38.8 °C)    There were no vitals taken for this visit. ROS: patient unable to communicate fluently. Detailed ros not feasible.     Physical Exam:    General: Well developed, well nourished female laying on the bed AAOx3 in moderate distress - having chills    General:   awake alert and oriented   HEENT:  Normocephalic, atraumatic, PERRL, EOMI, no scleral icterus or pallor; no conjunctival hemmohage;  nasal and oral mucous are moist and without evidence of lesions. Neck supple, no bruits. Lymph Nodes:   no cervical, axillary or inguinal adenopathy   Lungs:   non-labored, bilaterally clear to auscultation- no crackles wheezes rales or rhonchi   Heart:   s1 and s2 irregular; no rubs or gallops, no edema, + pedal pulses   Abdomen:  soft, non-distended, active bowel sounds, no hepatomegaly, no splenomegaly. Non-tender   Genitourinary:  deferred   Extremities:   no clubbing, cyanosis; no joint effusions or swelling; muscle mass appropriate for age   Neurologic:  No gross focal sensory abnormalities; 5/5 muscle strength to upper extremities. 0/5 strength in lower extremities. Cranial nerves intact                        Skin:  No rash or ulcers noted   Back:  lumbar spine tenderness around L5, no paraspinal muscle guarding or rigidity, no CVA tenderness     Psychiatric:  No suicidal or homicidal ideations, appropriate mood and affect         Labs: Results:   Chemistry Recent Labs      04/20/17   0642   GLU  188*   NA  132*   K  4.5   CL  95*   CO2  28   BUN  23*   CREA  1.90*   CA  8.7   AGAP  9   BUCR  12      CBC w/Diff Recent Labs      04/20/17   0923  04/20/17   0642   WBC  21.2*   --    RBC  3.40*   --    HGB  9.0*  9.0*   HCT  26.2*  26.4*   PLT  209   --    GRANS  88*   --    LYMPH  3*   --    EOS  0   --       Microbiology No results for input(s): CULT in the last 72 hours.        RADIOLOGY:    All available imaging studies/reports in [facility] for this admission were reviewed    Dr. Jose Dixon, Infectious Disease Specialist  [phone]  April 20, 2017  3:49 PM

## 2017-04-20 NOTE — IP AVS SNAPSHOT
[address] Patient: Alexia Hassan MRN: [id number] HDD:[id number]/00/2584 You are allergic to the following Allergen Reactions Vancomycin Itching Ampicillin Itching Bactrim (Sulfamethoxazole-Trimethoprim) Unknown (comments) Blueberry Swelling Causes throat swelling Ciprofloxacin Itching Codeine Other (comments) Jumpy feeling Crestor (Rosuvastatin) Itching Darvocet A500 (Propoxyphene N-Acetaminophen) Itching Demerol (Meperidine) Itching Levaquin (Levofloxacin) Itching Lipitor (Atorvastatin) Myalgia Magnesium Oxide Itching  
 nausea Minocin (Minocycline) Unknown (comments) Pcn (Penicillins) Itching Pravachol (Pravastatin) Swelling Swelling in mouth Sulfa (Sulfonamide Antibiotics) Itching Ultracet (Tramadol-Acetaminophen) Itching Vicodin (Hydrocodone-Acetaminophen) Unknown (comments) Vytorin 10-10 (Ezetimibe-Simvastatin) Myalgia Percodan (Oxycodone Hcl-Oxycodone-Asa) Itching Recent Documentation Height Weight Breastfeeding? BMI OB Status Smoking Status 1.702 m 110.9 kg No 38.28 kg/m2 Hysterectomy Never Smoker Emergency Contacts Name Discharge Info Relation Home Work Mobile Tomi Gonzalez DISCHARGE CAREGIVER [3] Spouse [3] [phone] About your hospitalization You were admitted on:  April 20, 2017 You last received care in the:  [facility] [address] You were discharged on:  May 25, 2017 Unit phone number:  [phone] Why you were hospitalized Your primary diagnosis was:  Acute Paraplegia (Hcc)  Your diagnoses also included:  Benign Hypertensive Heart Disease With Systolic Chf, Nyha Class 2 (Hcc), Decreased Calculated Glomerular Filtration Rate (Gfr), Iliopsoas Muscle Hematoma, Impaired Mobility And Adls, History Of Coumadin Therapy, Psoas Hematoma, Right, Secondary To Anticoagulant Therapy, Type 2 Diabetes Mellitus With Diabetic Neuropathy (Hcc), Sepsis (Hcc), Aicd Generator Infection (Hcc), Psoas Abscess, Right (Hcc), Krueger Catheter In Place On Admission, Urinary Tract Infection Due To Enterococcus, Group B Streptococcal Infection Providers Seen During Your Hospitalizations Provider Role Specialty Primary office phone Mk Pedroza MD Attending Provider Internal Medicine [phone] Your Primary Care Physician (PCP) Primary Care Physician Office Phone Office Fax Carolee Leal [phone] Follow-up Information Follow up With Details Comments Contact Info Karis Scherer DO On 6/6/2017 @10:30AM [address] [place] [address] [phone] Your Appointments Thursday June 29, 2017  9:00 AM EDT PROCEDURE with [facility] [place] [address] ([address] [place] [address]) [address] [address] [phone] Current Discharge Medication List  
  
START taking these medications Dose & Instructions Dispensing Information Comments Morning Noon Evening Bedtime  
 albuterol-ipratropium 2.5 mg-0.5 mg/3 ml Nebu Commonly known as:  Diania Comes Your last dose was: Your next dose is:    
   
   
 Dose:  3 mL  
3 mL by Nebulization route four (4) times daily. Quantity:  30 Nebule Refills:  0  
     
   
   
   
  
 aluminum-magnesium hydroxide 200-200 mg/5 mL suspension Commonly known as:  MAALOX Your last dose was: Your next dose is:    
   
   
 Dose:  15 mL Take 15 mL by mouth four (4) times daily as needed for Indigestion. Quantity:  100 mL Refills:  0  
     
   
   
   
  
 cefTRIAXone 2 gram 2 g IVPB Your last dose was:     
   
Your next dose is:    
   
   
 Dose:  2 g  
 2 g by IntraVENous route every twenty-four (24) hours for 20 days. Quantity:  20 Dose Refills:  0  
     
   
   
   
  
 famotidine 20 mg tablet Commonly known as:  PEPCID Your last dose was: Your next dose is:    
   
   
 Dose:  20 mg Take 1 Tab by mouth nightly. Quantity:  30 Tab Refills:  0  
     
   
   
   
  
 fluconazole 200 mg tablet Commonly known as:  DIFLUCAN Your last dose was: Your next dose is:    
   
   
 Dose:  200 mg Take 1 Tab by mouth daily for 6 days. FDA advises cautious prescribing of oral fluconazole in pregnancy. Quantity:  6 Tab Refills:  0  
     
   
   
   
  
 folic acid 1 mg tablet Commonly known as:  Google Your last dose was: Your next dose is:    
   
   
 Dose:  1 mg Take 1 Tab by mouth daily. Quantity:  30 Tab Refills:  0 Lactobacillus Acidoph & Bulgar 1 million cell Tab tablet Commonly known as:  Amy Brand Your last dose was: Your next dose is:    
   
   
 Dose:  1 Tab Take 1 Tab by mouth two (2) times a day for 21 days. Quantity:  42 Tab Refills:  0  
     
   
   
   
  
 midodrine 2.5 mg tablet Commonly known as:  Yvette Wilder Your last dose was: Your next dose is:    
   
   
 5 mg [2 tablets] po three time a day for 5 days then 2.5 mg po three time a day x 5 days then stop Quantity:  45 Tab Refills:  0  
     
   
   
   
  
 polyethylene glycol 17 gram packet Commonly known as:  Young Castellanos Your last dose was: Your next dose is:    
   
   
 Dose:  17 g Take 1 Packet by mouth two (2) times a day. Quantity:  30 Packet Refills:  0 CONTINUE these medications which have CHANGED Dose & Instructions Dispensing Information Comments Morning Noon Evening Bedtime  
 allopurinol 100 mg tablet Commonly known as:  Beulah Huitron What changed:  how much to take Your last dose was: Your next dose is:    
   
   
 Dose:  50 mg Take 0.5 Tabs by mouth daily. Indications: HYPERURICEMIA Quantity:  30 Tab Refills:  0  
     
   
   
   
  
 insulin glargine 100 unit/mL injection Commonly known as:  LANTUS What changed:  how much to take Your last dose was: Your next dose is:    
   
   
 Dose:  5 Units 5 Units by SubCUTAneous route nightly. Indications: type 2 diabetes mellitus Quantity:  1 Vial  
Refills:  0  
     
   
   
   
  
 oxyCODONE-acetaminophen 7.5-325 mg per tablet Commonly known as:  PERCOCET 7.5 What changed:   
- when to take this 
- reasons to take this Your last dose was: Your next dose is:    
   
   
 Dose:  1 Tab Take 1 Tab by mouth every six (6) hours as needed (for pain level greater than 5/10). Max Daily Amount: 4 Tabs. Indications: Pain Quantity:  20 Tab Refills:  0 CONTINUE these medications which have NOT CHANGED Dose & Instructions Dispensing Information Comments Morning Noon Evening Bedtime  
 acetaminophen 325 mg tablet Commonly known as:  TYLENOL Your last dose was: Your next dose is:    
   
   
 Dose:  650 mg Take 2 Tabs by mouth every four (4) hours as needed (for fever or pain level less than 5/10). Indications: Fever, Pain Quantity:  30 Tab Refills:  0  
     
   
   
   
  
 cholecalciferol 1,000 unit tablet Commonly known as:  VITAMIN D3 Your last dose was: Your next dose is:    
   
   
 Dose:  2000 Units Take 2 Tabs by mouth daily. Indications: PREVENTION OF VITAMIN D DEFICIENCY Quantity:  30 Tab Refills:  0  
     
   
   
   
  
 ferrous sulfate 325 mg (65 mg iron) tablet Your last dose was: Your next dose is:    
   
   
 Dose:  325 mg Take 1 Tab by mouth two (2) times daily (with meals) for 14 days. Quantity:  28 Tab Refills:  0  
     
   
   
   
  
 insulin aspart 100 unit/mL injection Commonly known as:  Jeannette Chaudhary Your last dose was: Your next dose is: INITIATE INSULIN CORRECTIVE PROTOCOL (HR): Normal Insulin Sensitivity  For Blood Sugar (mg/dL) of:    Less than 150 =   0 units          150 -199 =   2 units 200 -249 =   4 units 250 -299 =   6 units 300 -349 =   8 units 350 and above =   10 units If 2 glucose readings are above 200 mg/dL Quantity:  10 mL Refills:  6  
     
   
   
   
  
 pravastatin 40 mg tablet Commonly known as:  PRAVACHOL Your last dose was: Your next dose is:    
   
   
 Dose:  40 mg Take 40 mg by mouth nightly. Indications: DYSLIPIDEMIA Refills:  0  
     
   
   
   
  
 senna-docusate 8.6-50 mg per tablet Commonly known as:  Katharina Best Your last dose was: Your next dose is:    
   
   
 Dose:  2 Tab Take 2 Tabs by mouth daily (after dinner). Indications: Constipation Quantity:  30 Tab Refills:  0 ZOFRAN ODT 4 mg disintegrating tablet Generic drug:  ondansetron Your last dose was: Your next dose is:    
   
   
 Dose:  4 mg Take 4 mg by mouth every eight (8) hours as needed for Nausea. Refills:  0 STOP taking these medications   
 bumetanide 1 mg tablet Commonly known as:  BUMEX  
   
  
 carvedilol 6.25 mg tablet Commonly known as:  COREG  
   
  
 cyclobenzaprine 10 mg tablet Commonly known as:  FLEXERIL  
   
  
 docusate sodium 100 mg capsule Commonly known as:  COLACE  
   
  
 gabapentin 400 mg capsule Commonly known as:  NEURONTIN  
   
  
 KLOR-CON M20 20 mEq tablet Generic drug:  potassium chloride SITagliptin 50 mg tablet Commonly known as:  Keisha Simons Where to Get Your Medications Information on where to get these meds will be given to you by the nurse or doctor. ! Ask your nurse or doctor about these medications  
  albuterol-ipratropium 2.5 mg-0.5 mg/3 ml Nebu allopurinol 100 mg tablet  
 aluminum-magnesium hydroxide 200-200 mg/5 mL suspension  
 cefTRIAXone 2 gram 2 g IVPB  
 famotidine 20 mg tablet  
 ferrous sulfate 325 mg (65 mg iron) tablet  
 fluconazole 420 mg tablet  
 folic acid 1 mg tablet  
 insulin glargine 100 unit/mL injection Lactobacillus Acidoph & Bulgar 1 million cell Tab tablet  
 midodrine 2.5 mg tablet  
 oxyCODONE-acetaminophen 7.5-325 mg per tablet  
 polyethylene glycol 17 gram packet Discharge Instructions DISCHARGE SUMMARY from Nurse The following personal items are in your possession at time of discharge: 
 
Dental Appliances: None Visual Aid: None Home Medications: None Jewelry: None Clothing: None Other Valuables: Cell Phone PATIENT INSTRUCTIONS: 
 
 
F-face looks uneven A-arms unable to move or move unevenly S-speech slurred or non-existent T-time-call 911 as soon as signs and symptoms begin-DO NOT go Back to bed or wait to see if you get better-TIME IS BRAIN. Warning Signs of HEART ATTACK Call 911 if you have these symptoms: 
? Chest discomfort. Most heart attacks involve discomfort in the center of the chest that lasts more than a few minutes, or that goes away and comes back. It can feel like uncomfortable pressure, squeezing, fullness, or pain. ? Discomfort in other areas of the upper body. Symptoms can include pain or discomfort in one or both arms, the back, neck, jaw, or stomach. ? Shortness of breath with or without chest discomfort. ? Other signs may include breaking out in a cold sweat, nausea, or lightheadedness. Don't wait more than five minutes to call [address]! Fast action can save your life. Calling 911 is almost always the fastest way to get lifesaving treatment. Emergency Medical Services staff can begin treatment when they arrive  up to an hour sooner than if someone gets to the hospital by car. Discharge Orders Procedure Order Date Status Priority Quantity Spec Type Associated Dx DIET DIABETIC CONSISTENT CARB Regular; AHA-LOW-CHOL FAT 05/25/17 1357 Normal Routine 1 Questions: Texture:  Regular Cardiac:  AHA-LOW-CHOL FAT [employer] Announcement We are excited to announce that we are making your provider's discharge notes available to you in [employer]. You will see these notes when they are completed and signed by the physician that discharged you from your recent hospital stay. If you have any questions or concerns about any information you see in [employer], please call the Health Information Department where you were seen or reach out to your Primary Care Provider for more information about your plan of care. Introducing \[facility]\"" & HEALTH SERVICES! [facility] introduces [employer] patient portal. Now you can access parts of your medical record, email your doctor's office, and request medication refills online. 1. In your internet browser, go to [email or web address] 2. Click on the First Time User? Click Here link in the Sign In box. You will see the New Member Sign Up page. 3. Enter your [employer] Access Code exactly as it appears below. You will not need to use this code after youve completed the sign-up process. If you do not sign up before the expiration date, you must request a new code. · [employer] Access Code: [email or web address] Expires: 8/23/2017  4:28 PM 
 
4. Enter the last four digits of your Social Security Number (xxxx) and Date of Birth (mm/dd/yyyy) as indicated and click Submit. You will be taken to the next sign-up page. 5. Create a Eyegroove ID. This will be your Ey[employer]ove login ID and cannot be changed, so think of one that is secure and easy to remember. 6. Create a [employer] password. You can change your password at any time. 7. Enter your Password Reset Question and Answer. This can be used at a later time if you forget your password. 8. Enter your e-mail address. You will receive e-mail notification when new information is available in [address] 9. Click Sign Up. You can now view and download portions of your medical record. 10. Click the Download Summary menu link to download a portable copy of your medical information. If you have questions, please visit the Frequently Asked Questions section of the [employer] website. Remember, [employer] is NOT to be used for urgent needs. For medical emergencies, dial 911. Now available from your iPhone and Android! General Information Please provide this summary of care documentation to your next provider. Patient Signature:  ____________________________________________________________ Date:  ____________________________________________________________  
  
Radha Dave Provider Signature:  ____________________________________________________________ Date:  ____________________________________________________________

## 2017-04-20 NOTE — H&P
[facility]  ROUTINE H AND PS    Name:  Juan Goncalves  MR#:  [id number]  :  1950  Account #:  [de-identified]  Date of Adm:  2017      CHIEF COMPLAINT: Numbness to her right lower extremity and  inability to move both lower extremities, onset today. PRIMARY CARE PHYSICIAN: Dr. Idania Tapia. HISTORY OF PRESENT ILLNESS: This is a 55-year-old [facility]  American female with multiple medical issues including a recent  admission for right iliopsoas muscle hematoma. No surgical  intervention was recommended during that admission. Her Coumadin  was held. Her INR was 4.3 upon that admission. She was transferred  to our acute rehabilitation side for inpatient rehabilitation. She was  doing well. She was actually discharged to home. She was in the  process of getting into the car when she noted increasing numbness to  her right lower extremity. She had a repeat CT scan of the abdomen  and pelvis on 2017 which showed the right psoas muscle  hematoma had increased in size compared with previous CT  2017. It was now 6 cm in diameter compared with 4.5 cm  previously. Based on this imaging, it was a presumed muscle  hematoma increased in size, suggesting continued or interval  hemorrhage as described on dedicated CT lumbar spine, component  of this psoas hematoma closely associated with right L2-L3 neural  foramen, may be medial displacement of muscle by the trauma, some  component of central canal hemorrhage was possible. She also had a  CT of the lumbar spine done yesterday as previously stated, with soft  tissue density similar to the psoas muscle appearing to extend medially  into the right L2-L3 foramen. No vertebral body fracture identified. Concern for component of central canal (epidural) hemorrhage. She  also was noted to be febrile and tachycardic this morning; a rapid  response was called at around 9:30 this morning.  Based on the notes,  she denied any chest pain or shortness of breath at that time. A  urinalysis was ordered, which was reassuring. Chest x-ray was ordered  as well. Cardiac markers were done. EKG was done. Tylenol 650 mg  p.o. x1 was done. Her urine and blood cultures are pending at the time  of this dictation. She was evaluated by Dr. Shaggy Abbasi this morning. At that  time, which was early this morning around 8 a.m., he noted pain in her  right leg diffusely with diffuse numbness. Her right lower extremity was  noted to be weak with flat reflexes. No  intervention was recommended. Her case was also reviewed by  Interventional Radiology apparently, who felt that the hematoma did  not require drainage. She has also been evaluated by Dr. Evin Stovall, a little bit earlier this afternoon, who noted that she is not able  to move either one of her lower extremities. This is a new finding. Dr. Shaggy Abbasi is currently scrubbed on a procedure. The patient is being  transferred to the acute medical side for intervention. Dr. Rosy Moreno  has been consulted as well. She recommends vancomycin and  ceftriaxone and IV Benadryl to be given prior to the administration of  these medications. At my interview, the patient reports increasing  numbness to the right lower extremity. She denies any subjective  fevers. She denies sweats or chills. She states she has had a cough  productive of green phlegm which started yesterday. She has had  some nausea, but no vomiting. She denies constipation. She states  her last bowel movement was yesterday and it was okay. She denies  any burning with urination. No hematuria. No hematochezia. She  states she has chronic blurred vision for which she is under the care of Dr. Ann Paulson. She denies any double vision. No rash. PAST MEDICAL HISTORY:  1. Right iliopsoas hematoma. 2. Nonischemic cardiomyopathy. 3. History of complete heart block. She is status post pacemaker,  AICD. 4. Chronic anticoagulation with Coumadin.  Last dose of Coumadin was  03/30/2017.  5. MULTIPLE DRUG ALLERGIES. 6. Pyelonephritis 03/30/2017.  7. Status post biventricular implantable AICD pacemaker check  03/31/2017 with normal function per cardiovascular specialists. 8. Diabetes type 2 with hyperglycemia, complicated by peripheral  neuropathy. Most recent A1c was 8 on 03/30/2017.  9. History of anemia of chronic disease. 10. Chronic kidney disease, stage III in the setting of atrophic kidney. 11. Coumadin coagulopathy with INR 4.3 on 03/30/2017. 12. Dyslipidemia. Per Dr. Valerie Castillo office note, she only tolerates  Pravastatin. 13. History of constipation. 14. Obesity with a body mass index of 34.86 kilograms per meter  square. 15. Urinary retention. This apparently has resolved. At my exam, she  does not have a Krueger. ALLERGIES:  1. VANCOMYCIN. 2. AMPICILLIN. 3. BACTRIM. 4. BLUEBERRY CAUSES THROAT SWELLING. 5. CIPROFLOXACIN. 6. CODEINE. 7. CRESTOR. 8. DARVOCET. 9. DEMEROL. [address] 11. LIPITOR. 12. MAGNESIUM OXIDE. 13. MINOCYCLINE. 14. PENICILLIN.  15. PRAVACHOL. 16. SULFA. [address] [place] [address]. [address] [place] [address]. PAST SURGICAL HISTORY:  1. Hysterectomy. 2. Cholecystectomy apparently for gallstones. 3. Defibrillator. 4. Defibrillator/pacemaker. SOCIAL HISTORY: She denies tobacco or alcohol. She was residing  at home prior to her admission in late March of this year. She is retired  from [employer]. FAMILY HISTORY: Her father had cancer. PHYSICAL EXAMINATION  VITAL SIGNS: T-current 100.7, T-max 101.9, pulse 94, improved from  118, blood pressure 121/66, respiratory rate 16, saturating 96% on  room air. GENERAL: She is awake, alert, and oriented x4. Obese, lying in bed,  no acute distress. HEENT: Normocephalic, atraumatic. Pupils equally round and reactive  to light. No scleral icterus, no conjunctival pallor. There are white  plaques to her tongue. Moist mucous membranes. NECK: Supple. Neck veins flat. No cervical lymphadenopathy. No  carotid bruit. CARDIOVASCULAR: S1, S2, regular rate and rhythm. No murmur. LUNGS: Clear to auscultation bilateral anterior and infra-axillary fields. ABDOMEN: Obese, soft, nontender, nondistended, normoactive bowel  sounds. EXTREMITIES: No edema. Dorsalis pedis pulses 1+ bilaterally. NEUROLOGIC: Strength bilateral lower extremities is 0/5. Plantars are equivocal. Decreased sensation to her lower extremities. Bilateral upper extremity strength is 5/5. SKIN: No rash. No lesion. LABORATORY DATA: WBC 21.2, hemoglobin and hematocrit 9 and  26.2, MCV 77.1, MCH 26.5, platelets 917, neutrophils 88%, bands 2%. Urinalysis was unremarkable. PT 16.3, INR 1.4. Sodium 132,  potassium 4.5, chloride 95, CO2 28, BUN 23, creatinine 1.9, glucose  188, calcium 8.7, CK-MB 1.4, troponin 0.69. Urine and blood cultures are pending at the time of this dictation. Chest x-ray from today, hypoinflation exaggerates appearance of the  cardiac silhouette and bronchovascular markings. Mild pulmonary  vascular congestion not excluded. CT lumbar spine noncontrast, as previously reported interval increase  in size of the right iliopsoas muscle. Soft tissue density similar to the  psoas muscle appears to extent medially into the right L2-L3 foramen. No vertebral body fracture identified. Possibility of a component of  central canal/epidural hemorrhage, degenerative changes in the lower  lumbar spine with bulging. Does not appear to be a critical central  canal or foraminal stenosis at these levels. CT abdomen and pelvis without contrast, right iliopsoas muscle has  increased in size compared with previous scan 04/02/2017, now 6  cm in diameter compared with 4.5 cm previously. Increase in size  suggests continued or interval hemorrhage. Large left renal cyst  is stable, improved aeration at the lung bases. EKG: Electronic ventricular pacemaker, rate 114.  There was previous  EKG 03/30/2017, ventricular rate has increased by 38 beats per  minute. ASSESSMENT AND PLAN:  1. Acute paraplegia in the setting of sepsis (tachycardia, leukocytosis,  fever). Concern for abscess. She is presently getting a CAT scan of  her thoracic and lumbar spine. Dr. Bryn Toth has been consulted. He is  apparently scrubbed on a procedure currently. Dr. Juarez Polanco has been consulted as well, as well as Dr. Jo Villalba. Please follow  her cultures and her imaging study results. 2. Sepsis present on admission, given her fever, tachycardia,  leukocytosis. See previous number. 3. Indeterminate troponin in the setting of sepsis. Will trend these,  monitor her on step-down. She follows with Dr. Gabe Green should she  require cardiology consult. 4. Nonischemic cardiomyopathy. Will hold her Bumex as she appears  somewhat volume depleted. Continue her beta blocker. 5. History of complete heart block. She is status post pacemaker AICD,  normal functioning on pacemaker check 03/31/2017. 6. MULTIPLE DRUG ALLERGIES have been noted. 7. Pyelonephritis 03/30/2017. Her current urinalysis is reassuring. 8. Diabetes type 2 complicated by peripheral neuropathy. Will hold her  Lantus and Januvia. Sliding scale insulin. She is n.p.o. Check  hemoglobin A1c.  9. Chronic kidney disease. Will give her some fluids currently as her  creatinine is up slightly. 10. Acute bronchitis. She is receiving antibiotics. [address] We will give her some Mycelex troches. 12. Anemia, microcytic, hyperchromic. 13. Dyslipidemia. She only tolerates Pravastatin per office notes. 14. Obesity. 15. Constipation. This seems to have resolved. Will continue bowel  regimen. 16. Avoid chemical deep venous thrombosis prophylaxis. DISPOSITION: She is a FULL CODE. Transfer to step-down. TIME SPENT: 70 minutes.         Mili Alvarez M.D.    Angelica Johansen / Pat Condon  D:  04/20/2017   14:06  T:  04/20/2017   14:56  Job #:  [id number]

## 2017-04-20 NOTE — PROGRESS NOTES
TRANSFER - OUT REPORT:    Verbal report given to DANIEL Valle(name) on Cuate Shaikh  being transferred to ICU(unit) for routine post - op       Report consisted of patients Situation, Background, Assessment and   Recommendations(SBAR). Information from the following report(s) SBAR, Kardex, Intake/Output and MAR was reviewed with the receiving nurse. Lines:   Peripheral IV 04/20/17 Left Arm (Active)   Site Assessment Clean, dry, & intact 4/20/2017 12:00 PM   Phlebitis Assessment 0 4/20/2017 12:00 PM   Dressing Status Clean, dry, & intact 4/20/2017 12:00 PM        Opportunity for questions and clarification was provided.       Patient transported with:   Jiahe

## 2017-04-20 NOTE — PROGRESS NOTES
Rapid Response Note  [facility]    Patient: Linda Gonsalez 77 y.o. female  [id number]  1950      Admit Date: 4/6/2017   Admission Diagnosis: Debility  Psoas hematoma, right, secondary to anticoagulant therapy    RAPID RESPONSE     Rapid response called for fever and tachycardia. Pt reports she feels warm but no palpitations chest pain of shortness of breath. She has a hx of afib with a pace-maker and is on a beta-blocker Per nursing pt with a psoas hematoma induced by anticoagulation. Imaging yesterday reveal some enlargment. Primary provider is aware of all findings and has already ordered a fever work up. Medications Reviewed    OBJECTIVE     Visit Vitals    /65    Pulse (!) 118    Temp (!) 101 °F (38.3 °C)    Resp 20    Ht 5' 7\" (1.702 m)    Wt 105.7 kg (233 lb)    SpO2 94%    Breastfeeding No    BMI 36.49 kg/m2       PHYSICAL:  General:  Alert and Responsive and in no acute distress. CV:  RRR, no murmurs, rubs, or gallops. RESP:  Unlabored breathing. Lungs clear to auscultation. no wheeze, rales, or rhonchi. Equal expansion bilaterally. ABD:  Soft, nontender, nondistended. Neuro:  oriented to person and place, but not date. Slight facial droop per patient and family members in room she is at her baseline    Medications administered: tylenol    EKG: paced tachycardia    Labs: blood cultures, lactic acid, cardiac enzymes, CXR, urine analysis, urine culture    ASSESSMENT, PLAN & DISPOSITION   Linda Gonsalez is a 77y.o. year old female admitted for Debility  Psoas hematoma, right, secondary to anticoagulant therapy. Rapid response called for tachycardia and fever. Fever work up ordered EKG sowed paced tachycardia, CXR shows clear lungs, she is hemodynamically stable /65 and then 112/65 Patient condition currently: stable. Will defer further work up of hematoma to primary team as provider is already aware of these findings prior to rapid.     --UA with culture  --CXR  --blood cultures  --cardiac enzymes  --tylenol 650  --EKG    Disposition: returned to primary care    Attending Dr. Bisi Rodriguez notified of rapid response. In agreement with plan. Primary team resuming care.        Frandy Ny MD  9:40 AM @[facility]@

## 2017-04-20 NOTE — PROCEDURES
RADIOLOGY POST PROCEDURE NOTE     April 20, 2017       6:32 PM     Preoperative Diagnosis:   Right psoas hematoma. Postoperative Diagnosis:  Infected right psoas hematoma. Jj Montenegro :  Dr. Orville Ybarra    Assistant:  None. Type of Anesthesia: 1% plain lidocaine    Procedure/Description:  Ct guided right psoas hematoma drainage and drainage catheter placement    Findings: Thick ~300 cc of pus was aspirated and sent for c/s. Estimated blood Loss:  Minimal    Specimen Removed:   yes    Blood transfusions:  None. Implants:  10F Resolve Right psoas hematoma drain to bulb suction.     Complications: None    Condition: Stable    Discharge Plan:  continue present therapy    Dina Bowers MD

## 2017-04-20 NOTE — PROGRESS NOTES
[facility] PHYSICAL REHABILITATION  [address] [place] [address], [address]     INPATIENT REHABILITATION  DAILY PROGRESS NOTE     Date: 4/20/2017    Name: Taiwo Hopper Age / Sex: 77 y.o. / female   CSN: [id number] MRN: [id number]   [address] [place] [address] Date: 4/6/2017 Length of Stay: 14 days     Primary Rehab Diagnosis: Impaired Mobility and ADLs secondary to Right iliopsoas muscle hematoma, secondary to supratherapeutic INR/anticoagulant therapy      Subjective:     Patient seen and examined. Blood pressure controlled. Blood sugars controlled. (+) fever since this AM    Patient's Complaint:   Increased weakness/numbness of right leg compared to yesterday   Now, with numbness and inability to move the left lower extremity     Pain Control: ongoing significant pain in which is stable and controlled by current meds      Objective:     Vital Signs:  Patient Vitals for the past 24 hrs:   BP Temp Pulse Resp SpO2   04/20/17 1047 121/66 (!) 100.7 °F (38.2 °C) 94 20 96 %   04/20/17 0950 122/64 (!) 101 °F (38.3 °C) (!) 112 20 94 %   04/20/17 0923 124/65 (!) 101 °F (38.3 °C) (!) 118 20 94 %   04/20/17 0748 124/67 (!) 101.9 °F (38.8 °C) (!) 109 20 94 %   04/19/17 2126 105/58 98.6 °F (37 °C) (!) 110 20 94 %   04/19/17 1609 122/69 97.8 °F (36.6 °C) (!) 120 20 92 %        Physical Exam:  GENERAL SURVEY: Patient is awake, alert, oriented x 3, laying supine in bed, not in acute respiratory distress.   HEENT: pink palpebral conjunctivae, anicteric sclerae, no nasoaural discharge, moist oral mucosa  NECK: supple, no jugular venous distention, no palpable lymph nodes  CHEST/LUNGS: symmetrical chest expansion, good air entry, clear breath sounds  HEART: adynamic precordium, good S1 S2, no S3, regular rhythm, tachycardic, no murmurs  ABDOMEN: obese, bowel sounds appreciated, soft, non-tender  EXTREMITIES: pink nailbeds, no edema, full and equal pulses, no calf tenderness   NEUROLOGICAL EXAM: The patient is awake, alert and oriented x3, able to answer questions fairly appropriately, able to follow 1 and 2 step commands. Able to tell time from the wall clock. Cranial nerves II-XII are grossly intact. (+) circumferential numbness of the RLE from the hip down. Motor strength is 4+/5 on BUE, 0/5 on BLE.       Current Medications:  Current Facility-Administered Medications   Medication Dose Route Frequency    sodium chloride (NS) flush 5-10 mL  5-10 mL IntraVENous PRN    bumetanide (BUMEX) tablet 1 mg  1 mg Oral BID    gabapentin (NEURONTIN) capsule 400 mg  400 mg Oral BID    pravastatin (PRAVACHOL) tablet 40 mg  40 mg Oral QHS    cholecalciferol (VITAMIN D3) tablet 2,000 Units  2,000 Units Oral DAILY    insulin glargine (LANTUS) injection 15 Units  15 Units SubCUTAneous QHS    modafinil (PROVIGIL) tablet 100 mg  100 mg Oral DAILY    melatonin tablet 3 mg  3 mg Oral QHS    SITagliptin (JANUVIA) tablet 50 mg  50 mg Oral DAILY    naloxegol (MOVANTIK) tablet 25 mg  25 mg Oral ACB    carvedilol (COREG) tablet 6.25 mg  6.25 mg Oral BID WITH MEALS    oxyCODONE-acetaminophen (PERCOCET 7.5) 7.5-325 mg per tablet 1 Tab  1 Tab Oral TID    oxyCODONE-acetaminophen (PERCOCET 7.5) 7.5-325 mg per tablet 1 Tab  1 Tab Oral Q4H PRN    ondansetron hcl (ZOFRAN) tablet 4 mg  4 mg Oral TID PRN    allopurinol (ZYLOPRIM) tablet 100 mg  100 mg Oral DAILY    docusate sodium (COLACE) capsule 100 mg  100 mg Oral DAILY AFTER BREAKFAST    senna-docusate (PERICOLACE) 8.6-50 mg per tablet 2 Tab  2 Tab Oral PCD    acetaminophen (TYLENOL) tablet 650 mg  650 mg Oral Q4H PRN    bisacodyl (DULCOLAX) tablet 10 mg  10 mg Oral Q48H PRN    insulin lispro (HUMALOG) injection   SubCUTAneous TIDAC    folic acid (FOLVITE) tablet 1 mg  1 mg Oral DAILY    glucose chewable tablet 16 g  4 Tab Oral PRN    glucagon (GLUCAGEN) injection 1 mg  1 mg IntraMUSCular PRN    dextrose (D50W) injection syrg 12.5-25 g  25-50 mL IntraVENous PRN    Lactobacillus Acidoph & Hollandgar [facility]WOOD [facility] tablet 2 Tab  2 Tab Oral BID       Allergies:   Allergies   Allergen Reactions    Vancomycin Itching    Ampicillin Itching    Bactrim [Sulfamethoxazole-Trimethoprim] Unknown (comments)    Blueberry Swelling     Causes throat swelling    Ciprofloxacin Itching    Codeine Other (comments)     Jumpy feeling    Crestor [Rosuvastatin] Itching    Darvocet A500 [Propoxyphene N-Acetaminophen] Itching    Demerol [Meperidine] Itching    Levaquin [Levofloxacin] Itching    Lipitor [Atorvastatin] Myalgia    Magnesium Oxide Itching     nausea    Minocin [Minocycline] Unknown (comments)    Pcn [Penicillins] Itching    Pravachol [Pravastatin] Swelling     Swelling in mouth     Sulfa (Sulfonamide Antibiotics) Itching    Ultracet [Tramadol-Acetaminophen] Itching    Vicodin [Hydrocodone-Acetaminophen] Unknown (comments)    Vytorin 10-10 [Ezetimibe-Simvastatin] Myalgia    Percodan [Oxycodone Hcl-Oxycodone-Asa] Itching       Lab/Data Review:  Recent Results (from the past 24 hour(s))   GLUCOSE, POC    Collection Time: 04/19/17  4:23 PM   Result Value Ref Range    Glucose (POC) 243 (H) 70 - 110 mg/dL   GLUCOSE, POC    Collection Time: 04/19/17  8:34 PM   Result Value Ref Range    Glucose (POC) 260 (H) 70 - 110 mg/dL   HGB & HCT    Collection Time: 04/20/17  6:42 AM   Result Value Ref Range    HGB 9.0 (L) 12.0 - 16.0 g/dL    HCT 26.4 (L) 35.0 - 45.0 %   PROTHROMBIN TIME + INR    Collection Time: 04/20/17  6:42 AM   Result Value Ref Range    Prothrombin time 16.3 (H) 11.5 - 15.2 sec    INR 1.4 (H) 0.8 - 1.2     METABOLIC PANEL, BASIC    Collection Time: 04/20/17  6:42 AM   Result Value Ref Range    Sodium 132 (L) 136 - 145 mmol/L    Potassium 4.5 3.5 - 5.5 mmol/L    Chloride 95 (L) 100 - 108 mmol/L    CO2 28 21 - 32 mmol/L    Anion gap 9 3.0 - 18 mmol/L    Glucose 188 (H) 74 - 99 mg/dL    BUN 23 (H) 7.0 - 18 MG/DL    Creatinine 1.90 (H) 0.6 - 1.3 MG/DL    BUN/Creatinine ratio 12 12 - 20      GFR est AA 32 (L) >60 ml/min/1.73m2 GFR est non-AA 26 (L) >60 ml/min/1.73m2    Calcium 8.7 8.5 - 10.1 MG/DL   GLUCOSE, POC    Collection Time: 04/20/17  7:48 AM   Result Value Ref Range    Glucose (POC) 206 (H) 70 - 110 mg/dL   EKG, 12 LEAD, INITIAL    Collection Time: 04/20/17  9:16 AM   Result Value Ref Range    Ventricular Rate 114 BPM    Atrial Rate 114 BPM    P-R Interval 114 ms    QRS Duration 174 ms    Q-T Interval 380 ms    QTC Calculation (Bezet) 523 ms    Calculated P Axis 41 degrees    Calculated R Axis 125 degrees    Calculated T Axis 119 degrees    Diagnosis       Electronic ventricular pacemaker  When compared with ECG of 30-MAR-2017 11:29,  Vent. rate has increased BY  38 BPM     LACTIC ACID, PLASMA    Collection Time: 04/20/17  9:23 AM   Result Value Ref Range    Lactic acid 1.2 0.4 - 2.0 MMOL/L   CBC WITH AUTOMATED DIFF    Collection Time: 04/20/17  9:23 AM   Result Value Ref Range    WBC 21.2 (H) 4.6 - 13.2 K/uL    RBC 3.40 (L) 4.20 - 5.30 M/uL    HGB 9.0 (L) 12.0 - 16.0 g/dL    HCT 26.2 (L) 35.0 - 45.0 %    MCV 77.1 74.0 - 97.0 FL    MCH 26.5 24.0 - 34.0 PG    MCHC 34.4 31.0 - 37.0 g/dL    RDW 15.0 (H) 11.6 - 14.5 %    PLATELET 419 771 - 287 K/uL    MPV 8.6 (L) 9.2 - 11.8 FL    NEUTROPHILS 88 (H) 42 - 75 %    BAND NEUTROPHILS 2 0 - 5 %    LYMPHOCYTES 3 (L) 20 - 51 %    MONOCYTES 7 2 - 9 %    EOSINOPHILS 0 0 - 5 %    BASOPHILS 0 0 - 3 %    ABS. NEUTROPHILS 19.1 (H) 1.8 - 8.0 K/UL    ABS. LYMPHOCYTES 0.6 (L) 0.8 - 3.5 K/UL    ABS. MONOCYTES 1.5 (H) 0 - 1.0 K/UL    ABS. EOSINOPHILS 0.0 0.0 - 0.4 K/UL    ABS.  BASOPHILS 0.0 0.0 - 0.06 K/UL    DF MANUAL      PLATELET COMMENTS ADEQUATE PLATELETS      RBC COMMENTS ANISOCYTOSIS  1+       CARDIAC PANEL,(CK, CKMB & TROPONIN)    Collection Time: 04/20/17  9:33 AM   Result Value Ref Range     (H) 26 - 192 U/L    CK - MB 1.4 <3.6 ng/ml    CK-MB Index 0.3 0.0 - 4.0 %    Troponin-I, Qt. 0.69 (H) 0.0 - 0.045 NG/ML   POC LACTIC ACID    Collection Time: 04/20/17 10:24 AM   Result Value Ref Range    Lactic Acid (POC) 1.2 0.4 - 2.0 mmol/L   URINALYSIS W/MICROSCOPIC    Collection Time: 04/20/17 10:26 AM   Result Value Ref Range    Color YELLOW      Appearance CLOUDY      Specific gravity 1.018 1.005 - 1.030      pH (UA) 5.0 5.0 - 8.0      Protein TRACE (A) NEG mg/dL    Glucose NEGATIVE  NEG mg/dL    Ketone TRACE (A) NEG mg/dL    Bilirubin NEGATIVE  NEG      Blood NEGATIVE  NEG      Urobilinogen 0.2 0.2 - 1.0 EU/dL    Nitrites NEGATIVE  NEG      Leukocyte Esterase TRACE (A) NEG      WBC 0 to 3 0 - 4 /hpf    Epithelial cells FEW 0 - 5 /lpf    Bacteria 4+ (A) NEG /hpf   GLUCOSE, POC    Collection Time: 04/20/17 12:01 PM   Result Value Ref Range    Glucose (POC) 187 (H) 70 - 110 mg/dL       Estimated Glomerular Filtration Rate:  CKD-EPI:   On admission, estimated GFR was 53.5 mL/min/1.73m2 based on a Creatinine of 1.22 mg/dl. Most recent estimated GFR was 31.3 mL/min/1.73m2 based on a Creatinine of 1.90 mg/dl. MDRD:   On admission, estimated GFR was 56.7 mL/min/1.73m2 based on a Creatinine of 1.22 mg/dl. Most recent estimated GFR was 34 mL/min/1.73m based on a Creatinine of 1.90 mg/dl. 2. Assessment:     Primary Rehabilitation Diagnosis  1. Impaired Mobility and ADLs  2. Right iliopsoas muscle hematoma, secondary to supratherapeutic INR/anticoagulant therapy  3.  Sepsis secondary to possible right iliopsoas/epidural abscess resulting to paraplegia       Comorbidities   Nonischemic cardiomyopathy    History of complete heart block    Biventricular implantable cardioverter-defibrillator in situ    Left bundle branch block (LBBB) on electrocardiogram    Type 2 diabetes mellitus with diabetic neuropathy    Dyslipidemia    Diabetic neuropathy associated with type 2 diabetes mellitus    Obstructive sleep apnea on CPAP    History of AICD generator infection     Difficult airway for intubation    Benign hypertensive heart disease with systolic CHF, NYHA class 2     Decreased calculated glomerular filtration rate (GFR)    Chronic anemia    History of deep venous thrombosis    Anticoagulated on Coumadin    Pacemaker twiddler's syndrome    Chronic systolic heart failure     Obesity (BMI 35.0-39.9 without comorbidity)     History of pyelonephritis    History of Coumadin therapy    Gout       Plan:     1. Justification for continued stay: Good progression towards established rehabilitation goals. 2. Medical Issues being followed closely:    [x]  Fall and safety precautions     []  Wound Care     [x]  Bowel and Bladder Function     [x]  Fluid Electrolyte and Nutrition Balance     [x]  Pain Control      3. Issues that 24 hour rehabilitation nursing is following:    [x]  Fall and safety precautions     []  Wound Care     [x]  Bowel and Bladder Function     [x]  Fluid Electrolyte and Nutrition Balance     [x]  Pain Control      [x]  Assistance with and education on in-room safety with transfers to and from the bed, wheelchair, toilet and shower. 4. Acute rehabilitation plan of care:    []  Continue current care and rehab. []  Physical Therapy           []  Occupational Therapy           []  Speech Therapy     [x]  Hold Rehab until further notice     5. Medications:    [x]  MAR Reviewed     [x]  Continue Present Medications     6. DVT Prophylaxis:      []  Lovenox     []  Unfractionated Heparin     []  Coumadin     []  Xarelto     [x]  MIMI Stockings     [x]  Sequential Compression Device     []  None     7.  Orders:   > Right iliopsoas muscle hematoma, secondary to supratherapeutic INR/anticoagulant therapy   > CT scan of the abdomen and pelvis (3/30/2017) showed an asymmetrically enlarged right iliopsoas muscle compared to the left extending to the right groin region with evidence of intramuscular hematoma in different stage; this is new compared to the recent CT 3/16/17; mild fatty stranding noted in the right pericolonic gutter and the right groin; no additional hematoma or other significant injury seen. > CT scan of the abdomen and pelvis (4/02/2017) showed redemonstration of prominent hematoma within the right psoas muscle, essentially unchanged as compared to previous study on 3/13/2017; around right psoas muscle there are are some hazy densities, indicating mild reactive or congestive changes similar to previous study; otherwise, there is no definable confluent inflammatory phlegmon around right psoas muscle; there is no evidence of hematoma in left psoas muscle or in rest of the retroperitoneum.    > Coumadin had been discontinued on 3/30/2017   > INR (4/6/2017) = 1.3   > Hgb/Hct (4/7/2017) = 8.8/26.5    > Hgb/Hct (4/10/2017) = 9.0/27.2    > Hgb/Hct (4/13/2017) = 8.7/26.8    > Hgb/Hct (4/17/2017) = 9.3/28.3      > Benign hypertensive heart disease with chronic systolic heart failure   > On 4/10/2017, decreased Carvedilol from 12.5 mg to 6.25 mg PO BID with meals (8AM, 5PM)   > Continue Carvedilol 6.25 mg PO BID with meals (8AM, 5PM)     > History of pyelonephritis   > Patient had completed a 5-day treatment course of Aztreonam during his stay at [facility]    > On 4/7/2017, patient was started on Floranex 2 tabs PO BID    > Continue Floranex 2 tabs PO BID     > Type 2 diabetes mellitus with diabetic neuropathy   > On 4/8/2017, decreased Lantus from 24 units SC BID to 25 units SC q HS   > On 4/12/2017, decreased Lantus from 25 units to 20 units SC q HS   > On 4/13/2017, resumed Sitagliptin 50 mg PO once daily   > On 4/15/2017, decreased Lantus from 20 units to 15 units SC q HS   > Continue:    > Sitagliptin 50 mg PO once daily    > Lantus 15 units SC q HS    > Humalog insulin sliding scale SC TID AC only     > Gout / Hyperuricemia    > Prior to admission to [facility], the patient was on Allopurinol 100 mg PO BID   > Uric acid (4/7/2017) = 3.8   > On 4/7/2017, decreased Allopurinol from 100 mg PO BID to 100 mg PO once daily   > Continue Allopurinol 100 mg PO once daily     > Constipation   > On 4/10/2017, discontinued Polyethylene glycol 17 grams in 8 oz water PO once daily q 7PM    > Add Naloxegol 25 mg PO once daily before breakfast   > Continue:    > Docusate sodium 100 mg PO once daily after breakfast    > Pericolace 2 tabs PO once daily after dinner    > Neurocognitive modulation    > On 4/14/2017, patient was started on:    > Modafinil 100 mg PO q AM    > Melatonin 3 mg PO q HS   > Continue:    > Modafinil 100 mg PO q AM    > Melatonin 3 mg PO q HS    > Swelling of both lower extremities    > Venous duplex ultrasound of both lower extremities (4/18/2017) showed no evidence of deep venous thrombosis detected in the veins visualized.    > Weakness / Numbness of bilateral lower extremity    > Rule out lumbar radiculopathy vs sciatic/femoral nerve irritation/compression due to right iliopsoas hematoma   > CT scan of the abdomen and pelvis (4/19/2017) showed the right psoas muscle has increased in size compared with 4/2/2017 CT, now 6 cm diameter compared with 4.5 cm previously. Similar extension to the iliopsoas. Heterogeneous density, presumed psoas muscle hematoma. Increase in size suggests continued or interval hemorrhage. As described on dedicated CT lumbar spine component of the psoas hematoma closely associated with right L2/L3 neural foramen. May be medial displacement of muscle by the trauma. Some component of central canal hemorrhage possible. Large left renal cyst is stable. Improved aeration at the lung bases. Some persistent right lung base atelectasis. > CT scan of the lumbar spine (4/19/2017) showed that compared with 4/2/2017 there is been an interval increase in the size of the right iliopsoas muscle. Measured 4.5 cm diameter on the previous CT, currently 5.9 x 6.3 cm. Contents of the psoas muscle difficult to assess with noncontrast only CT.  Continued increase in size suggests persistent or recurrent bleeding in the setting of presumed hematoma. Soft tissue density similar to the psoas muscle appears to extend medially into the right L2/L3 foramen. Effacement of perineural fat within the left and right L2/3 and to lesser extent L1/L2 neural foramina. Also effaced dorsal  epidural fat at these levels. There is no vertebral body fracture identified. The loss of fat planes in the L1/2 and L2/3 foramina and dorsal epidural space raises the possibility of a component of central canal (epidural) hemorrhage. Unless the permanent pacing device is MR compatible, MRI not option for evaluating the central canal. Suggest CT lumbar spine with IV contrast to attempt to separate tissue planes, perhaps better evaluate central spinal canal. There are degenerative changes in the lower lumbar spine with disc bulging,  but does not appear to be a critical central canal or foraminal stenosis at these levels.   > On 4/19/2017, called:    > Neurology consult (Dr. Ami Alvarado) called for evaluation and comanagement    > Spine surgery consult (Dr. Dk Cruz) called for evaluation and comanagement    > Spoke with General Surgery (Dr. Siddharth Carbajal, Sr.) regarding possible drainage of the right iliopsoas hematoma and he recommended to call Interventional Radiology for IR-guided drainage    > Spoke with Interventional Radiology (Dr. Luisa Shipman) regarding possible drainage of the right iliopsoas hematoma and he said there is now way to tell if the increase in diameter in the right iliopsoas muscle was recent or old compared to the 4/2/2017 CT scan. He recommended to check Pt/INR and to monitor Hgb/Hct but no urgent drainage needed.  He recommended to call a consult to Interventional Radiology again in the AM so that someone can review the images as he is not in front of the computer.   > (+) tachycardia since yesterday PM (4/19/2017, 4:09 PM)   > (+) fever this AM (T 101.9 F) at 7:48 AM   > Hgb/Hct (4/20/2017) = 9.0/26.4   > BUN/Creatinine (4/20/2017) = 23.190    > Patient was seen and examined by Dr. Koffi Watt this AM    > Assessment/Plan:     > Patient with psoas hematoma secondary to [facility]. Has associated pain, weakness and numbness.       > INR corrected. Hematoma larger than intial study, patient with continued symptoms.      > Interventional radiology appropriately did not wish to drain initially. I believe that now with corrected coags, that it is reasonable to place drain to resolve mass effect. This may improve patients symptoms over time. Patient will require ongoing PT.      > Otherwise it will resolve itself naturally.       > Form an orthopedic/ spine perspective - I do not have anything to offer the patient with regards to intervention.    > Blood cultures ordered for work-up of fever   > Rapid Response Team was called this AM due to persistent fever/tachycardia and MEWS score of 4    > Urinalysis: WNL    > Chest x-ray: WNL    > Troponin-I = 0.69 (most likely due to demand ischemia on myocardium)   > Further work-ups ordered    > Lactic acid = 1.2    > WBC count = 21.2   > Indwelling perez catheter was placed due to urinary retention   > Interventional Radiology (Dr. Shy Gaines) was reconsulted and upon review of the images, he felt the size of the hematoma is smaller and he recommended no drainage for now and observation   > Infectious Disease consult (Dr. Birgit Sandy) called for evaluation and comanagement   > Neurology (Dr. Lashawn De La Paz) had seen and examined the patient and he reported paraplegia on both lower extremities   > Hospitalist consult (Dr. Doris Sweet) called for transfer of patient to a higher level of care (inpatient at [facility])    > Analgesia   > On 4/10/2017:    > Increased Gabapentin from 300 mg to 400 mg PO BID    > Increased Percocet 5/325 to Percocet 7.5/325 1 tab PO TID (8AM, 12PM, 4PM)    > Increased Percocet 5/325 to Percocet 7.5/325 1 tab PO q 4 hr PRN for pain level greater than 4/10 (from 8PM to 4AM only)    > On 4/11/2017, added Cyclobenzaprine 5 mg PO q 8 hr   > On 4/14/2017:    > Discontinued Cyclobenzaprine 5 mg PO q 8 hr    > Increased Gabapentin from 400 mg to 600 mg PO BID   > Continue:    > Acetaminophen 650 mg PO q 4 hr PRN for pain level less than 5/10    > Decrease Gabapentin from 600 mg to 400 mg PO BID    > Percocet 7.5/325 1 tab PO TID (8AM, 12PM, 4PM)    > Percocet 7.5/325 1 tab PO q 4 hr PRN for pain level greater than 4/10 (from 8PM to 4AM only)    > Warm compress to right thigh/hip PRN      8. Patient's progress in rehabilitation and medical issues discussed with the patient. All questions answered to the best of my ability. Care plan discussed with patient,  and nurse. 9. Discharge Planning:   > Patient was scheduled for discharge yesterday but due significant functional decline noted when trying to transfer to car, will cancel discharge order, will pursue work-up and will consult Neurology and Neurosurgery   > Home health physical therapy and occupational therapy   > F/U: 1.  PCP (Dr. Sharmaine Ernst)    2. Cardiology (Dr. Evie Grey)   > For discharge to [facility] as an inpatient under the service of the [facility] Hospitalist Group (Dr. Benson Campos)      Signed:    Sue Mcnamara MD    April 20, 2017

## 2017-04-20 NOTE — PROGRESS NOTES
10 FR drain placed to right psoas muscle by Dr. Daris Barthel. Drain is attached to an [address]. About 60 ml milky pus sent to lab for analysis.

## 2017-04-20 NOTE — REHAB NOTE
[facility]    Bedside and Verbal shift change report given to Jason Ansari RN  (oncoming nurse) by Reggie Nelson RN   (offgoing nurse). Report included the following information SBAR, Kardex, MAR and Recent Results. Situation:   Code Status: Full Code   Reason for Admission: Psoas hematoma  Hospital Day: 14   Problem List:   Hospital Problems  Date Reviewed: 4/19/2017          Codes Class Noted POA    History of Coumadin therapy ICD-10-CM: Z79.01  ICD-9-CM: V58.61  Unknown Yes    Overview Signed 4/6/2017 10:35 PM by Nanci German MD     Anticoagulation for chronic atrial fibrillation; Discontinued on 3/30/2017             Decreased calculated glomerular filtration rate (GFR) ICD-10-CM: R94.4  ICD-9-CM: 794.4  3/30/2017 Yes    Overview Signed 4/6/2017  5:47 PM by Nanci German MD     Calculated GFR equivalent to that of CKD stage 3 = 30-59 ml/min             History of pyelonephritis ICD-10-CM: Z87.440  ICD-9-CM: V13.02  3/30/2017 Yes        Iliopsoas muscle hematoma ICD-10-CM: P27.25RH  ICD-9-CM: 924.00  3/30/2017 Yes        * (Principal)Psoas hematoma, right, secondary to anticoagulant therapy ICD-10-CM: S30. 1XXA  ICD-9-CM: 924.9, E934.2  3/30/2017 Yes        Generalized weakness ICD-10-CM: R53.1  ICD-9-CM: 780.79  3/30/2017 Yes        Impaired mobility and ADLs ICD-10-CM: Z74.09  ICD-9-CM: 799.89  3/30/2017 Yes        Benign hypertensive heart disease with systolic CHF, NYHA class 2 (HCC) (Chronic) ICD-10-CM: I11.0, I50.20  ICD-9-CM: 402.11, 428.20, 428.0  9/5/2012 Yes        Type 2 diabetes mellitus with diabetic neuropathy (HCC) (Chronic) ICD-10-CM: E11.40  ICD-9-CM: 250.60, 357.2  6/28/2011 Yes              Background:   Past Medical History:   Past Medical History:   Diagnosis Date    Benign hypertensive heart disease with systolic CHF, NYHA class 2 ([facility] [address] 9/5/2012    Biventricular implantable cardioverter-defibrillator in situ 04/28/2005    Upgraded to BiV AICD; gen change 4/2008; pocket revision 10/2009; Abdominal - done on 8/22/2012 by Dr. Turner Martin Cardiac cath 08/15/1996    Patent coronaries. Elev LVEDP. EF 50-55%.  Cardiac echocardiogram 06/23/2015    Ltd study. EF 45-50%. Mild, diffuse hypk. Severe apical hypk. No mass or thrombus was clearly identified, although imaging was suboptimal.      Cardiac nuclear imaging test 06/19/2015    Fixed distal apical, distal septal defect more likely due to RV pacing than prior infarct. No ischemia. EF 46%. RWMA c/w RV pacing. Nondiagnostic EKG on pharm stress test.      Cardiovascular lower extremity venous duplex 09/04/2012    Acute, non-occlusive DVT in CFV on right. No DVT on left. No superficial thrombosis bilaterally.  Cardiovascular upper extremity venous duplex 08/27/2012    DVT in axillary vein on left. Left subclavian was not visualized.     Chronic anemia 9/5/2012    Chronic systolic heart failure (HCC)     Decreased calculated glomerular filtration rate (GFR) 3/30/2017    Calculated GFR equivalent to that of CKD stage 3 = 30-59 ml/min    Diabetic neuropathy associated with type 2 diabetes mellitus ([address] 6/28/2011    Difficult airway for intubation 08/22/2012    see anesthesia airway note    Dyslipidemia 6/28/2011    Gout     History of complete heart block 6/28/2011    History of Coumadin therapy     Anticoagulation for DVT of the LUE; Discontinued on 3/30/2017    History of deep venous thrombosis 9/5/2012    Left upper extremity    History of pyelonephritis 3/30/2017    Left bundle branch block (LBBB) on electrocardiogram 6/28/2011    Nonischemic cardiomyopathy ([address] 6/28/2011    Obesity (BMI 35.0-39.9 without comorbidity) ([address] 3/13/2017    Obstructive sleep apnea on CPAP 2/7/2012    Psoas hematoma, right, secondary to anticoagulant therapy 3/30/2017    Type 2 diabetes mellitus with diabetic neuropathy ([address] 6/28/2011      Patient taking anticoagulants no    Patient has a defibrillator: yes Assessment:   Changes in Assessment throughout shift: no     Patient has central line: no Reasons if yes: Insertion date: Last dressing date:   Patient has Krueger Cath: no Reasons if yes: Insertion date:     Last Vitals:     Vitals:    04/18/17 2059 04/19/17 0717 04/19/17 1609 04/19/17 2126   BP: 125/74 106/68 122/69 105/58   Pulse: 78 78 (!) 120 (!) 110   Resp: 20 20 20 20   Temp: 100.3 °F (37.9 °C) 99.2 °F (37.3 °C) 97.8 °F (36.6 °C) 98.6 °F (37 °C)   SpO2: 95% 100% 92% 94%   Weight:       Height:            PAIN    Pain Assessment    Pain Intensity 1: 0 (04/20/17 0417) Pain Intensity 1: 2 (12/29/14 1105)    Pain Location 1: Abdomen, Hip Pain Location 1: Abdomen    Pain Intervention(s) 1: Medication (see MAR) Pain Intervention(s) 1: Medication (see MAR)  Patient Stated Pain Goal: 0 Patient Stated Pain Goal: 0  o Intervention effective: no  o Other actions taken for pain: pain medicine     Skin Assessment  Skin color Skin Color: Appropriate for ethnicity  Condition/Temperature Skin Condition/Temp: Dry, Warm  Integrity Skin Integrity: Intact  Turgor    Weekly Pressure Ulcer Documentation Weekly Pressure Ulcer Documentation: No Pressure Ulcer Noted-Pressure Ulcer Prevention Initiated  Wound Prevention & Protection Methods  Orientation of wound Orientation of Wound Prevention: Posterior  Location of Prevention Location of Wound Prevention: Sacrum/Coccyx  Dressing Present Dressing Present : No  Dressing Status    Wound Offloading Wound Offloading (Prevention Methods): Bed, pressure redistribution/air     INTAKE/OUPUT    Date 04/19/17 0700 - 04/20/17 0659 04/20/17 0700 - 04/21/17 0659   Shift 0700-1859 1900-0659 24 Hour Total 0700-1859 1900-0659 24 Hour Total   I  N  T  A  K  E   P. O. 600  600         P. O. 600  600       Shift Total  (mL/kg) 600  (5.7)  600  (5.7)      O  U  T  P  U  T   Urine  (mL/kg/hr)            Urine Occurrence(s) 1 x  1 x       Stool            Stool Occurrence(s) 0 x  0 x Shift Total  (mL/kg)           600      Weight (kg) 105.7 105.7 105.7 105.7 105.7 105.7       Recommendations:  1. Patient needs and requests: pain medicine    2. Diet: diabetic    3. Pending tests/procedures: no     4. Functional Level/Equipment: 1 x person assist    5. Estimated Discharge Date: TDB Posted on Whiteboard in Patients Room: [facility] Safety Check    A safety check occurred in the patient's room between off going nurse and oncoming nurse listed above. The safety check included the below items  Area Items   H  High Alert Medications - Verify all high alert medication drips (heparin, PCA, etc.)   E  Equipment - Suction is set up for ALL patients (with vania)  - Red plugs utilized for all equipment (IV pumps, etc.)  - WOWs wiped down at end of shift.  - Room stocked with oxygen, suction, and other unit-specific supplies   A  Alarms - Bed alarm is set for fall risk patients  - Ensure chair alarm is in place and activated if patient is up in a chair   L  Lines - Check IV for any infiltration  - Krueger bag is empty if patient has a Krueger   - Tubing and IV bags are labeled   S  Safety   - Room is clean, patient is clean, and equipment is clean. - Hallways are clear from equipment besides carts. - Fall bracelet on for fall risk patients  - Ensure room is clear and free of clutter  - Suction is set up for ALL patients (with vania)  - Hallways are clear from equipment besides carts.    - Isolation precautions followed, supplies available outside room, sign posted

## 2017-04-20 NOTE — ROUTINE PROCESS
0745 Pt. Awake alert and oriented x 4 no signs of distress Pt. Reported to be feeling slightly better than yesterday. Pt. C/o of still having weakness on bilateral lower ext. , + pedal pulses on both lower ext. Mather Yaquelin 0800 Pt. Had elevated temp 101.9 with  Pulse of 109, Muse 4. Dr. Olga Mary was notified that pt.  may need to be  Transferred  pt. To medical unit  and ordered for Blood Cultures x 2 sets Dr. Olga Mary  Stated   he made a Neurology Consult and  Awaiting the  follow up with Interventional Radiology for draining. Dr. Olga Mary stated he will follow up as soon  As he comes in to see pt.  0905 Rapid Response called agreed by unit director and  Team members Stat EKG , port Chest Xray and cardiac enzymes drawn, Dr. Kacey Bullock and Dr. Mariluz Browne evaluated. Pt. Given Tylenol 650mg po. Dr. Kacey Bullock ordered 500ccNormal saline bolus. 1000 Dr. Olga Mary arrived to assess pt. Consult for Interventional radiology and neurology  Awaiting. Family members arrived . New  IV site # 20 started on left fore arm.  1100 Pt. Repositioned in bed reported to still have pain in abdomen. Temperature retaken decreased  To 100.7.  1200 Cat Scan ordered as STAT per Dr. Olga Mary. Plan for Dr. Lashawn Ledezma Hospitalist to assume care. Per Hospitalist consult. 200 Dr. Lashawn Ledezma arrived and wrote additional admitting orders per Hospitalist.  [phone] [id number] Pt. Transferred to CT scan as ordered by stretcher. 1330 Plan to transfer pt. To stepdown/ ICU nursing jaymie Patel was notified. 1400 Dr. Olga Mary was called and recommened bed 311 .  1430 Nursing supervisor was called and stated OK to give report to ICU and transfer to bed 311.  1500 Report called to Tori pt. Transport notified.

## 2017-04-20 NOTE — CONSULTS
Asked to see this 76 yo F adm to rehab with pain and weakness secondary to R psoas hematoma. Was scheduled for discharge and found to have continued if not progressed weakness in right leg. Asked to comment. PE UE's nl  lle nl  Pain in right leg diffusely with diffuse numbness. Weak IP, quad 2-3/5. Flat reflex    Ct scan reviewed- large R psoas hematoma( larger) than initial study)     AP    Patient with psoas hematoma secondary to RU[facility]. Has associated pain, weakness and numbness. INR corrected. Hematoma larger than intial study, patient with continued symptoms. Interventional radiology appropriately did not wish to drain initially. I believe that now with corrected coags, that it is reasonable to place drain to resolve mass effect. This may improve patients symptoms over time. Patient will require ongoing PT. Otherwise it will resolve itself naturally. Form an orthopedic/ spine perspective - I do not have anything to offer the patient with regards to intervention.

## 2017-04-21 LAB
ALBUMIN SERPL BCP-MCNC: 2.5 G/DL (ref 3.4–5)
ALBUMIN/GLOB SERPL: 0.6 {RATIO} (ref 0.8–1.7)
ALP SERPL-CCNC: 144 U/L (ref 45–117)
ALT SERPL-CCNC: 29 U/L (ref 13–56)
ANION GAP BLD CALC-SCNC: 7 MMOL/L (ref 3–18)
AST SERPL W P-5'-P-CCNC: 54 U/L (ref 15–37)
BASOPHILS # BLD AUTO: 0 K/UL (ref 0–0.06)
BASOPHILS # BLD: 0 % (ref 0–3)
BILIRUB SERPL-MCNC: 0.7 MG/DL (ref 0.2–1)
BUN SERPL-MCNC: 30 MG/DL (ref 7–18)
BUN/CREAT SERPL: 15 (ref 12–20)
CALCIUM SERPL-MCNC: 8.2 MG/DL (ref 8.5–10.1)
CHLORIDE SERPL-SCNC: 102 MMOL/L (ref 100–108)
CO2 SERPL-SCNC: 27 MMOL/L (ref 21–32)
CREAT SERPL-MCNC: 1.96 MG/DL (ref 0.6–1.3)
DIFFERENTIAL METHOD BLD: ABNORMAL
EOSINOPHIL # BLD: 0 K/UL (ref 0–0.4)
EOSINOPHIL NFR BLD: 0 % (ref 0–5)
ERYTHROCYTE [DISTWIDTH] IN BLOOD BY AUTOMATED COUNT: 15.1 % (ref 11.6–14.5)
GLOBULIN SER CALC-MCNC: 3.9 G/DL (ref 2–4)
GLUCOSE BLD STRIP.AUTO-MCNC: 222 MG/DL (ref 70–110)
GLUCOSE BLD STRIP.AUTO-MCNC: 222 MG/DL (ref 70–110)
GLUCOSE BLD STRIP.AUTO-MCNC: 253 MG/DL (ref 70–110)
GLUCOSE BLD STRIP.AUTO-MCNC: 261 MG/DL (ref 70–110)
GLUCOSE BLD STRIP.AUTO-MCNC: 279 MG/DL (ref 70–110)
GLUCOSE SERPL-MCNC: 219 MG/DL (ref 74–99)
HCT VFR BLD AUTO: 22.7 % (ref 35–45)
HGB BLD-MCNC: 7.6 G/DL (ref 12–16)
INR PPP: 1.5 (ref 0.8–1.2)
LYMPHOCYTES # BLD AUTO: 5 % (ref 20–51)
LYMPHOCYTES # BLD: 1.2 K/UL (ref 0.8–3.5)
MAGNESIUM SERPL-MCNC: 1.8 MG/DL (ref 1.6–2.6)
MCH RBC QN AUTO: 25.8 PG (ref 24–34)
MCHC RBC AUTO-ENTMCNC: 33.5 G/DL (ref 31–37)
MCV RBC AUTO: 76.9 FL (ref 74–97)
METAMYELOCYTES NFR BLD MANUAL: 1 %
MONOCYTES # BLD: 1.2 K/UL (ref 0–1)
MONOCYTES NFR BLD AUTO: 5 % (ref 2–9)
NEUTS BAND NFR BLD MANUAL: 14 % (ref 0–5)
NEUTS SEG # BLD: 20.8 K/UL (ref 1.8–8)
NEUTS SEG NFR BLD AUTO: 75 % (ref 42–75)
PLATELET # BLD AUTO: 141 K/UL (ref 135–420)
PLATELET COMMENTS,PCOM: ABNORMAL
PMV BLD AUTO: 8.7 FL (ref 9.2–11.8)
POTASSIUM SERPL-SCNC: 4 MMOL/L (ref 3.5–5.5)
PROT SERPL-MCNC: 6.4 G/DL (ref 6.4–8.2)
PROTHROMBIN TIME: 17.6 SEC (ref 11.5–15.2)
RBC # BLD AUTO: 2.95 M/UL (ref 4.2–5.3)
RBC MORPH BLD: ABNORMAL
RBC MORPH BLD: ABNORMAL
SODIUM SERPL-SCNC: 136 MMOL/L (ref 136–145)
TROPONIN I SERPL-MCNC: 1.05 NG/ML (ref 0–0.04)
TROPONIN I SERPL-MCNC: 1.5 NG/ML (ref 0–0.04)
TROPONIN I SERPL-MCNC: 2.09 NG/ML (ref 0–0.04)
WBC # BLD AUTO: 23.4 K/UL (ref 4.6–13.2)

## 2017-04-21 PROCEDURE — 65660000000 HC RM CCU STEPDOWN

## 2017-04-21 PROCEDURE — 82962 GLUCOSE BLOOD TEST: CPT

## 2017-04-21 PROCEDURE — 74011000250 HC RX REV CODE- 250: Performed by: INTERNAL MEDICINE

## 2017-04-21 PROCEDURE — 74011636637 HC RX REV CODE- 636/637: Performed by: INTERNAL MEDICINE

## 2017-04-21 PROCEDURE — 74011250636 HC RX REV CODE- 250/636: Performed by: HOSPITALIST

## 2017-04-21 PROCEDURE — 84484 ASSAY OF TROPONIN QUANT: CPT | Performed by: INTERNAL MEDICINE

## 2017-04-21 PROCEDURE — 84484 ASSAY OF TROPONIN QUANT: CPT | Performed by: HOSPITALIST

## 2017-04-21 PROCEDURE — 85025 COMPLETE CBC W/AUTO DIFF WBC: CPT | Performed by: HOSPITALIST

## 2017-04-21 PROCEDURE — 93306 TTE W/DOPPLER COMPLETE: CPT

## 2017-04-21 PROCEDURE — 80053 COMPREHEN METABOLIC PANEL: CPT | Performed by: HOSPITALIST

## 2017-04-21 PROCEDURE — 74011000258 HC RX REV CODE- 258: Performed by: INTERNAL MEDICINE

## 2017-04-21 PROCEDURE — 74011250636 HC RX REV CODE- 250/636: Performed by: INTERNAL MEDICINE

## 2017-04-21 PROCEDURE — 74011636637 HC RX REV CODE- 636/637: Performed by: HOSPITALIST

## 2017-04-21 PROCEDURE — 83735 ASSAY OF MAGNESIUM: CPT | Performed by: HOSPITALIST

## 2017-04-21 PROCEDURE — 85610 PROTHROMBIN TIME: CPT | Performed by: HOSPITALIST

## 2017-04-21 PROCEDURE — 36415 COLL VENOUS BLD VENIPUNCTURE: CPT | Performed by: INTERNAL MEDICINE

## 2017-04-21 PROCEDURE — C9113 INJ PANTOPRAZOLE SODIUM, VIA: HCPCS | Performed by: INTERNAL MEDICINE

## 2017-04-21 PROCEDURE — 74011250637 HC RX REV CODE- 250/637: Performed by: HOSPITALIST

## 2017-04-21 PROCEDURE — 74011000250 HC RX REV CODE- 250: Performed by: HOSPITALIST

## 2017-04-21 RX ORDER — GENTAMICIN SULFATE 100 MG/100ML
100 INJECTION, SOLUTION INTRAVENOUS EVERY 24 HOURS
Status: DISCONTINUED | OUTPATIENT
Start: 2017-04-22 | End: 2017-04-22

## 2017-04-21 RX ORDER — EPINEPHRINE 0.1 MG/ML
INJECTION INTRACARDIAC; INTRAVENOUS
Status: DISPENSED
Start: 2017-04-21 | End: 2017-04-22

## 2017-04-21 RX ORDER — CALCIUM CHLORIDE INJECTION 100 MG/ML
INJECTION, SOLUTION INTRAVENOUS
Status: DISPENSED
Start: 2017-04-21 | End: 2017-04-22

## 2017-04-21 RX ORDER — SODIUM BICARBONATE 1 MEQ/ML
SYRINGE (ML) INTRAVENOUS
Status: DISPENSED
Start: 2017-04-21 | End: 2017-04-22

## 2017-04-21 RX ORDER — INSULIN GLARGINE 100 [IU]/ML
10 INJECTION, SOLUTION SUBCUTANEOUS DAILY
Status: DISCONTINUED | OUTPATIENT
Start: 2017-04-21 | End: 2017-04-23

## 2017-04-21 RX ADMIN — INSULIN LISPRO 6 UNITS: 100 INJECTION, SOLUTION INTRAVENOUS; SUBCUTANEOUS at 01:02

## 2017-04-21 RX ADMIN — CLOTRIMAZOLE 10 MG: 10 LOZENGE ORAL at 17:38

## 2017-04-21 RX ADMIN — DEXAMETHASONE SODIUM PHOSPHATE 4 MG: 4 INJECTION, SOLUTION INTRA-ARTICULAR; INTRALESIONAL; INTRAMUSCULAR; INTRAVENOUS; SOFT TISSUE at 05:29

## 2017-04-21 RX ADMIN — INSULIN LISPRO 6 UNITS: 100 INJECTION, SOLUTION INTRAVENOUS; SUBCUTANEOUS at 12:44

## 2017-04-21 RX ADMIN — SODIUM CHLORIDE 40 MG: 9 INJECTION INTRAMUSCULAR; INTRAVENOUS; SUBCUTANEOUS at 11:21

## 2017-04-21 RX ADMIN — DOCUSATE SODIUM 100 MG: 100 CAPSULE, LIQUID FILLED ORAL at 11:22

## 2017-04-21 RX ADMIN — GABAPENTIN 400 MG: 400 CAPSULE ORAL at 17:39

## 2017-04-21 RX ADMIN — SODIUM CHLORIDE 1000 ML: 900 INJECTION, SOLUTION INTRAVENOUS at 01:08

## 2017-04-21 RX ADMIN — Medication 2 TABLET: at 17:40

## 2017-04-21 RX ADMIN — GENTAMICIN SULFATE 160 MG: 40 INJECTION, SOLUTION INTRAMUSCULAR; INTRAVENOUS at 14:17

## 2017-04-21 RX ADMIN — INSULIN LISPRO 9 UNITS: 100 INJECTION, SOLUTION INTRAVENOUS; SUBCUTANEOUS at 17:43

## 2017-04-21 RX ADMIN — METRONIDAZOLE 500 MG: 500 INJECTION, SOLUTION INTRAVENOUS at 04:45

## 2017-04-21 RX ADMIN — ALLOPURINOL 100 MG: 100 TABLET ORAL at 11:22

## 2017-04-21 RX ADMIN — INSULIN GLARGINE 14 UNITS: 100 INJECTION, SOLUTION SUBCUTANEOUS at 22:07

## 2017-04-21 RX ADMIN — CLOTRIMAZOLE 10 MG: 10 LOZENGE ORAL at 11:00

## 2017-04-21 RX ADMIN — CEFEPIME 2 G: 2 INJECTION, POWDER, FOR SOLUTION INTRAVENOUS at 04:44

## 2017-04-21 RX ADMIN — CLOTRIMAZOLE 10 MG: 10 LOZENGE ORAL at 22:07

## 2017-04-21 RX ADMIN — CHOLECALCIFEROL TAB 25 MCG (1000 UNIT) 2000 UNITS: 25 TAB at 11:22

## 2017-04-21 RX ADMIN — Medication 3 MCG/MIN: at 00:29

## 2017-04-21 RX ADMIN — METRONIDAZOLE 500 MG: 500 INJECTION, SOLUTION INTRAVENOUS at 12:48

## 2017-04-21 RX ADMIN — PRAVASTATIN SODIUM 40 MG: 20 TABLET ORAL at 22:07

## 2017-04-21 RX ADMIN — DEXAMETHASONE SODIUM PHOSPHATE 4 MG: 4 INJECTION, SOLUTION INTRA-ARTICULAR; INTRALESIONAL; INTRAMUSCULAR; INTRAVENOUS; SOFT TISSUE at 20:01

## 2017-04-21 RX ADMIN — FOLIC ACID 1 MG: 1 TABLET ORAL at 11:22

## 2017-04-21 RX ADMIN — METRONIDAZOLE 500 MG: 500 INJECTION, SOLUTION INTRAVENOUS at 22:09

## 2017-04-21 RX ADMIN — DEXAMETHASONE SODIUM PHOSPHATE 4 MG: 4 INJECTION, SOLUTION INTRA-ARTICULAR; INTRALESIONAL; INTRAMUSCULAR; INTRAVENOUS; SOFT TISSUE at 12:46

## 2017-04-21 RX ADMIN — INSULIN GLARGINE 10 UNITS: 100 INJECTION, SOLUTION SUBCUTANEOUS at 12:14

## 2017-04-21 RX ADMIN — GABAPENTIN 400 MG: 400 CAPSULE ORAL at 11:22

## 2017-04-21 RX ADMIN — INSULIN LISPRO 4 UNITS: 100 INJECTION, SOLUTION INTRAVENOUS; SUBCUTANEOUS at 05:33

## 2017-04-21 NOTE — PROGRESS NOTES
[facility] Hospitalist Group  Progress Note    Patient: Karen Mojica Age: 77 y.o. : 1950 MR#: [id number] SSN: [id number]  Date/Time: 2017     Subjective:     My both legs are not moving   Patient denies Fever , Chills , Weight loss or Weight Gain , No SOB, No Cough , no Hemoptysis , No Chest pains , No Palpitations , No NVD . Assessment/Plan:    77 yr old Female with PMH of iliopsoas hematoma , patient developed sudden onset of Right  leg weakness , patient evaluated by rehab MD and was transferred to medical service for further management . Dr Teo Friedman - spine surgery consulted , neurology consulted , patient is admitted to ICU    1 Right Leg weakness   - Right Psoas hematoma , increase in size , IR drained Hematoma ( Post op diagnosis - infected hematoma- 300cc of pus aspirated  ) on Vanco , Genta and Flagyl   - significant leucocytosis     2 CMO - LBBB, - followed by cardiology     3 DM2- Continue ssi      4 H/O DVT [facility] - now off AC  5 Elevated trop - From stress , S/B cardi - Continue current treatments     6 Acute blood loss anemia   - Monitor H/H , BT if h/h < 7     7 UTI- POA   - Continue current antibiotics   - Follow urine culture       Case discussed with:  [x]Patient  []Family  [x]Nursing  []Case Management  DVT Prophylaxis:  []Lovenox  []Hep SQ  [x]SCDs  []Coumadin   []On Heparin gtt    Patient Active Problem List   Diagnosis Code    [facility]ische[facility] I42.8    History of complete heart block Z86.79    Biventricular implantable cardioverter-defibrillator in situ Z95.810    Left bundle branch block (LBBB) on electrocardiogram I44.7    Type 2 diabetes mellitus with diabetic neuropathy ([facility] [address] E11.40    Dyslipidemia E78.5    Diabetic neuropathy associated with type 2 diabetes mellitus ([facility] E11.40    Obstructive sleep apnea on CPAP G47.33, Z99.89    AICD generator infection ([facility] T82. 7XXA    Difficult airway for intubation T88. 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 1XXA    Impaired mobility and ADLs Z74.09    History of Coumadin therapy Z79.01    Gout M10.9    Acute paraplegia ([facility]) G82.20    Sepsis ([facility]) A41.9       Objective:   VS:   Visit Vitals    /61    Pulse 70    Temp 97.1 °F (36.2 °C)    Resp 16    Wt 113.4 kg (250 lb)    SpO2 100%    BMI 39.16 kg/m2      Tmax/24hrs: Temp (24hrs), Av.2 °F (37.3 °C), Min:97.1 °F (36.2 °C), Max:102.9 °F (39.4 °C)  IOBRIEF  Intake/Output Summary (Last 24 hours) at 17 1315  Last data filed at 17 0600   Gross per 24 hour   Intake           111.39 ml   Output              960 ml   Net          -848.61 ml       General:  Ill appearing   HEENT:  NC, Atraumatic. PERRLA, anicteric sclerae. Pulmonary:  CTA Bilaterally. No Wheezing/Rhonchi/Rales. Cardiovascular: Regular rate and Rhythm. GI:  Soft, Non distended, Non tender. + Bowel sounds. Extremities:  No edema, cyanosis, clubbing. No calf tenderness. Psych:  Not anxious or agitated. Neurologic: Grossly - Motor and Sensory functions are intact .  No Anxiety , calm , no Agitation         Medications:   Current Facility-Administered Medications   Medication Dose Route Frequency    insulin glargine (LANTUS) injection 10 Units  10 Units SubCUTAneous DAILY    gentamicin (GARAMYCIN) 160 mg in 0.9% sodium chloride 100 mL IVPB  160 mg IntraVENous ONCE    [START ON 2017] Gentamicin in Saline (Iso-osm) (GARAMYCIN) 100 mg/100 mL IVPB premix 100 mg  100 mg IntraVENous Q24H    cholecalciferol (VITAMIN D3) tablet 2,000 Units  2,000 Units Oral DAILY    docusate sodium (COLACE) capsule 100 mg  100 mg Oral DAILY AFTER BREAKFAST    gabapentin (NEURONTIN) capsule 400 mg  400 mg Oral BID    oxyCODONE-acetaminophen (PERCOCET 7.5) 7.5-325 mg per tablet 1 Tab  1 Tab Oral Q4H PRN    pravastatin (PRAVACHOL) tablet 40 mg  40 mg Oral QHS    senna-docusate (PERICOLACE) 8.6-50 mg per tablet 2 Tab  2 Tab Oral PCD    acetaminophen (TYLENOL) tablet 500 mg  500 mg Oral Q6H PRN    allopurinol (ZYLOPRIM) tablet 100 mg  100 mg Oral DAILY    clotrimazole (MYCELEX) frances 10 mg  10 mg Oral 5XD    folic acid (FOLVITE) tablet 1 mg  1 mg Oral DAILY    ondansetron (ZOFRAN) injection 4 mg  4 mg IntraVENous Q8H PRN    diphenhydrAMINE (BENADRYL) injection 25 mg  25 mg IntraVENous Q4H PRN    VANCOMYCIN INFORMATION NOTE   Other CONTINUOUS    dexamethasone (DECADRON) 4 mg/mL injection 4 mg  4 mg IntraVENous Q6H    insulin lispro (HUMALOG) injection   SubCUTAneous Q6H    glucose chewable tablet 16 g  16 g Oral PRN    glucagon (GLUCAGEN) injection 1 mg  1 mg IntraMUSCular PRN    dextrose (D50W) injection syrg 12.5-25 g  25-50 mL IntraVENous PRN    metroNIDAZOLE (FLAGYL) IVPB premix 500 mg  500 mg IntraVENous Q8H    pantoprazole (PROTONIX) 40 mg in sodium chloride 0.9 % 10 mL injection  40 mg IntraVENous DAILY    insulin glargine (LANTUS) injection 14 Units  14 Units SubCUTAneous QHS       Labs:    Recent Results (from the past 24 hour(s))   CULTURE, ANAEROBIC    Collection Time: 04/20/17  6:08 PM   Result Value Ref Range    Special Requests: MUSCLE DRAINAGE     Culture result: CULTURE IN PROGRESS,FURTHER UPDATES TO FOLLOW     CULTURE, BODY FLUID Natalia Petersen STAIN    Collection Time: 04/20/17  6:08 PM   Result Value Ref Range    Special Requests: MUSCLE DRAINAGE RIGHT PSOAS     GRAM STAIN MANY  WBC'S        GRAM STAIN FEW  GRAM POSITIVE COCCI  IN PAIRS        GRAM STAIN MANY  GRAM POSITIVE COCCI  IN CHAINS        GRAM STAIN       CALLED TO [facility] RN ICU AT 2000 [id number]. REPEATED TO [id number]. Culture result: CULTURE IN PROGRESS,FURTHER UPDATES TO FOLLOW     TROPONIN I    Collection Time: 04/20/17  7:26 PM   Result Value Ref Range    Troponin-I, Qt. 1.67 (HH) 0.0 - 0.045 NG/ML   TSH 3RD GENERATION    Collection Time: 04/20/17  7:26 PM   Result Value Ref Range    TSH 0.51 0.36 - 3.74 uIU/mL   LACTIC ACID, PLASMA    Collection Time: 04/20/17 10:10 PM   Result Value Ref Range    Lactic acid 1.7 0.4 - 2.0 MMOL/L   GLUCOSE, POC    Collection Time: 04/20/17 10:19 PM   Result Value Ref Range    Glucose (POC) 230 (H) 70 - 110 mg/dL   TROPONIN I    Collection Time: 04/20/17 11:50 PM   Result Value Ref Range    Troponin-I, Qt. 2.09 (HH) 0.0 - 0.045 NG/ML   GLUCOSE, POC    Collection Time: 04/21/17 12:42 AM   Result Value Ref Range    Glucose (POC) 253 (H) 70 - 110 mg/dL   PROTHROMBIN TIME + INR    Collection Time: 04/21/17  4:52 AM   Result Value Ref Range    Prothrombin time 17.6 (H) 11.5 - 15.2 sec    INR 1.5 (H) 0.8 - 1.2     CBC WITH AUTOMATED DIFF    Collection Time: 04/21/17  4:52 AM   Result Value Ref Range    WBC 23.4 (H) 4.6 - 13.2 K/uL    RBC 2.95 (L) 4.20 - 5.30 M/uL    HGB 7.6 (L) 12.0 - 16.0 g/dL    HCT 22.7 (L) 35.0 - 45.0 %    MCV 76.9 74.0 - 97.0 FL    MCH 25.8 24.0 - 34.0 PG    MCHC 33.5 31.0 - 37.0 g/dL    RDW 15.1 (H) 11.6 - 14.5 %    PLATELET 355 408 - 562 K/uL    MPV 8.7 (L) 9.2 - 11.8 FL    NEUTROPHILS 75 42 - 75 %    BAND NEUTROPHILS 14 (H) 0 - 5 %    LYMPHOCYTES 5 (L) 20 - 51 %    MONOCYTES 5 2 - 9 %    EOSINOPHILS 0 0 - 5 %    BASOPHILS 0 0 - 3 %    METAMYELOCYTES 1 (H) 0 %    ABS. NEUTROPHILS 20.8 (H) 1.8 - 8.0 K/UL    ABS. LYMPHOCYTES 1.2 0.8 - 3.5 K/UL    ABS. MONOCYTES 1.2 (H) 0 - 1.0 K/UL    ABS. EOSINOPHILS 0.0 0.0 - 0.4 K/UL    ABS.  BASOPHILS 0.0 0.0 - 0.06 K/UL    DF MANUAL      PLATELET COMMENTS ADEQUATE PLATELETS      RBC COMMENTS ANISOCYTOSIS  1+        RBC COMMENTS HYPOCHROMIA  1+       MAGNESIUM    Collection Time: 04/21/17  4:52 AM   Result Value Ref Range    Magnesium 1.8 1.6 - 2.6 mg/dL   METABOLIC PANEL, COMPREHENSIVE    Collection Time: 04/21/17  4:52 AM   Result Value Ref Range    Sodium 136 136 - 145 mmol/L    Potassium 4.0 3.5 - 5.5 mmol/L    Chloride 102 100 - 108 mmol/L    CO2 27 21 - 32 mmol/L    Anion gap 7 3.0 - 18 mmol/L    Glucose 219 (H) 74 - 99 mg/dL    BUN 30 (H) 7.0 - 18 MG/DL    Creatinine 1.96 (H) 0.6 - 1.3 MG/DL    BUN/Creatinine ratio 15 12 - 20      GFR est AA 31 (L) >60 ml/min/1.73m2    GFR est non-AA 26 (L) >60 ml/min/1.73m2    Calcium 8.2 (L) 8.5 - 10.1 MG/DL    Bilirubin, total 0.7 0.2 - 1.0 MG/DL    ALT (SGPT) 29 13 - 56 U/L    AST (SGOT) 54 (H) 15 - 37 U/L    Alk.  phosphatase 144 (H) 45 - 117 U/L    Protein, total 6.4 6.4 - 8.2 g/dL    Albumin 2.5 (L) 3.4 - 5.0 g/dL    Globulin 3.9 2.0 - 4.0 g/dL    A-G Ratio 0.6 (L) 0.8 - 1.7     TROPONIN I    Collection Time: 04/21/17  4:52 AM   Result Value Ref Range    Troponin-I, Qt. 1.50 (HH) 0.0 - 0.045 NG/ML   GLUCOSE, POC    Collection Time: 04/21/17  5:31 AM   Result Value Ref Range    Glucose (POC) 222 (H) 70 - 110 mg/dL   GLUCOSE, POC    Collection Time: 04/21/17 12:10 PM   Result Value Ref Range    Glucose (POC) 222 (H) 70 - 110 mg/dL   TROPONIN I    Collection Time: 04/21/17 12:24 PM   Result Value Ref Range    Troponin-I, Qt. 1.05 (H) 0.0 - 0.045 NG/ML       Signed By: Svetlana Patterson MD     April 21, 2017

## 2017-04-21 NOTE — PROGRESS NOTES
Kinetic Dosing- Initial Progress Note    Pharmacy Consult ordered by Dr. Migdalia Monk     Indication: bloodstream infection, synergy    Patient clinical status and labs ordered/reviewed. Pt Weight Weight: 113.4 kg (250 lb)   Serum Creatinine Lab Results   Component Value Date/Time    Creatinine 1.96 04/21/2017 04:52 AM       Creatinine Clearance Estimated Creatinine Clearance: 36.7 mL/min (based on Cr of 1.96). BUN Lab Results   Component Value Date/Time    BUN 30 04/21/2017 04:52 AM       WBC Lab Results   Component Value Date/Time    WBC 23.4 04/21/2017 04:52 AM      Temperature Temp: 97.1 °F (36.2 °C)   HR Pulse (Heart Rate): 70     BP BP: 128/61           Kinetic Dosing Parameters:   Vd = 24.7 L     K = 0.093 hr-1              t ½ = 7.5 hr    Drug Levels:   Vancomycin    No results for input(s): VANCP, VANCT, VANCR, VANRA in the last 72 hours. Gentamicin   No results for input(s): GENP, GENT in the last 72 hours. No lab exists for component:  GENR   Tobramycin   No results for input(s): TOBP, TOBT, TOBR in the last 72 hours. Amikacin   No results for input(s): Doren [place] in the last 72 hours.     No lab exists for component:  ROSANNA Barone DAMIKR     Dose for naïve patient was initiated at: gentamicin 160mg ivpb x1 then 100mg q24h     Continue to monitor    Sign: MARBELLA Monge [facility]  Date: 4/21/2017  Time: 11:33 AM

## 2017-04-21 NOTE — ROUTINE PROCESS
Patient's cant' moved al LE. S/P psoas  Hematoma drains placement by IR yesterday. Accordion Drain was placed on the Rt lower  Flank draining thick pus looking purulent drainage creamy looking with pink tinged. Has thick callus Rt. Foot worse thant Lt. Rt foot callus ha dark discoloration on the center under thick layer of dead skin. Hypotensive and placed on Levophed Gtt. Till 0300 this am. Pt maitaining BP in the 130's post Levo stopped.

## 2017-04-21 NOTE — PROGRESS NOTES
[facility]  ICU Progress Note      Name: Caitlyn Estrada   : 1950   MRN: [id number]   Date: 2017 8:04 AM     [x]I have reviewed the flowsheet and previous days notes. Events overnight reviewed and discussed with nursing staff. Vital signs and records reviewed. Subjective:     No new events overnight. Patient reports that she is still not able to move her legs bilaterally. She stated that she is afraid that she will never be able to move them again. She denies any pain throughout her body, specifically her legs, back, and chest. She denies nausea and vomiting. ROS:Review of systems not obtained due to patient factors. Medication Review:  · Pressors - none  · Sedation - none  · Antibiotics - cefepime, metronidazole, vancomycin  · Pain - none  · GI/ DVT - protonix, SCD    Safety Bundles: CAUTI/ Severe Sepsis Protocol/ Electrolyte Replacement Protocol    Vital Signs:    Visit Vitals    /61    Pulse 70    Temp 97.1 °F (36.2 °C)    Resp 16    Wt 113.4 kg (250 lb)    SpO2 100%    BMI 39.16 kg/m2       O2 Device: Nasal cannula   O2 Flow Rate (L/min): 2 l/min   Temp (24hrs), Av.7 °F (37.6 °C), Min:97.1 °F (36.2 °C), Max:102.9 °F (39.4 °C)       Intake/Output:   Last shift:         Last 3 shifts:    No intake or output data in the 24 hours ending 17 0804    Physical Exam:    General: Alert and oriented   HEENT:  Anicteric sclerae; pink palpebral conjunctivae; mucosa moist  Resp:  Symmetrical chest expansion, no accessory muscle use; good airway entry; no rales/ wheezing/ rhonchi noted  CV:  S1, S2 present; regular rate and rhythm  GI:  Abdomen soft, non-tender; (+) active bowel sounds  Extremities:  +2 pulses on all extremities; unable to move lower extremities without or against resistance. Skin:  Warm; no rashes/ lesions noted  Neurologic:  0/5 strength in the lower extremities bilaterally. 5/5 strength at upper extremities.       DATA:     Current Facility-Administered Medications   Medication Dose Route Frequency    cholecalciferol (VITAMIN D3) tablet 2,000 Units  2,000 Units Oral DAILY    docusate sodium (COLACE) capsule 100 mg  100 mg Oral DAILY AFTER BREAKFAST    gabapentin (NEURONTIN) capsule 400 mg  400 mg Oral BID    oxyCODONE-acetaminophen (PERCOCET 7.5) 7.5-325 mg per tablet 1 Tab  1 Tab Oral Q4H PRN    pravastatin (PRAVACHOL) tablet 40 mg  40 mg Oral QHS    senna-docusate (PERICOLACE) 8.6-50 mg per tablet 2 Tab  2 Tab Oral PCD    acetaminophen (TYLENOL) tablet 500 mg  500 mg Oral Q6H PRN    allopurinol (ZYLOPRIM) tablet 100 mg  100 mg Oral DAILY    clotrimazole (MYCELEX) frances 10 mg  10 mg Oral 5XD    dextrose (D50W) injection syrg 12.5-25 g  25-50 mL IntraVENous PRN    diphenhydrAMINE (BENADRYL) injection 25 mg  25 mg IntraVENous U9Y PRN    folic acid (FOLVITE) tablet 1 mg  1 mg Oral DAILY    ondansetron (ZOFRAN) injection 4 mg  4 mg IntraVENous Q8H PRN    dilTIAZem (CARDIZEM) 100 mg in 0.9% sodium chloride (MBP/ADV) 100 mL infusion  15 mg/hr IntraVENous TITRATE    diphenhydrAMINE (BENADRYL) injection 25 mg  25 mg IntraVENous Q4H PRN    cefepime (MAXIPIME) 2 g in 0.9% sodium chloride (MBP/ADV) 100 mL MBP  2 g IntraVENous Q12H    VANCOMYCIN INFORMATION NOTE   Other CONTINUOUS    dexamethasone (DECADRON) 4 mg/mL injection 4 mg  4 mg IntraVENous Q6H    insulin lispro (HUMALOG) injection   SubCUTAneous Q6H    glucose chewable tablet 16 g  16 g Oral PRN    glucagon (GLUCAGEN) injection 1 mg  1 mg IntraMUSCular PRN    dextrose (D50W) injection syrg 12.5-25 g  25-50 mL IntraVENous PRN    NOREPINephrine (LEVOPHED) 1 mg/mL injection        metroNIDAZOLE (FLAGYL) IVPB premix 500 mg  500 mg IntraVENous Q8H    NOREPINephrine (LEVOPHED) 8,000 mcg in dextrose 5% 250 mL infusion  2-16 mcg/min IntraVENous TITRATE    pantoprazole (PROTONIX) 40 mg in sodium chloride 0.9 % 10 mL injection  40 mg IntraVENous DAILY    insulin glargine (LANTUS) injection 14 Units  14 Units SubCUTAneous QHS    vasopressin (VASOSTRICT) 100 Units in 0.9% sodium chloride 100 mL infusion  0.03 Units/min IntraVENous TITRATE         Labs: Results:       Chemistry Recent Labs      04/21/17   0452  04/20/17   0642   GLU  219*  188*   NA  136  132*   K  4.0  4.5   CL  102  95*   CO2  27  28   BUN  30*  23*   CREA  1.96*  1.90*   CA  8.2*  8.7   AGAP  7  9   BUCR  15  12   AP  144*   --    TP  6.4   --    ALB  2.5*   --    GLOB  3.9   --    AGRAT  0.6*   --       CBC w/Diff Recent Labs      04/21/17   0452  04/20/17   0923  04/20/17   0642   WBC  23.4*  21.2*   --    RBC  2.95*  3.40*   --    HGB  7.6*  9.0*  9.0*   HCT  22.7*  26.2*  26.4*   PLT  141  209   --    GRANS  75  88*   --    LYMPH  5*  3*   --    EOS  0  0   --       Coagulation Recent Labs      04/21/17   0452  04/20/17   0642   PTP  17.6*  16.3*   INR  1.5*  1.4*       Liver Enzymes Recent Labs      04/21/17   0452   TP  6.4   ALB  2.5*   AP  144*   SGOT  54*      ABG No results found for: PH, PHI, PCO2, PCO2I, PO2, PO2I, HCO3, HCO3I, FIO2, FIO2I   Microbiology Recent Labs      04/20/17   1808   CULT  PENDING          Telemetry: []Sinus []A-flutter []Paced    []A-fib []Multiple PVCs                    Imaging:  [x]I have personally reviewed the patients radiographs  []Radiographs reviewed with radiologist   []No change from prior, tubes and lines in adequate position  []Improved   []Worsening        IMPRESSION:   · Psoas abscess (staph vs. Strep on gram stain)  · Sepsis (severe sepsis based on old classification)  · Bacteremia   · ? 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 Steroids started. Abscess drained by IR. Neurology consulted. PT consulted. Decompression unlikely to benefit now though recommend at least a call to neurosurgeon  · CV: hemodynamically stable, no longer requiring. Serial troponins 1.67>2.09>1.50. EKG paced. Will likely require cardiac risk stratification. Hold BP meds. Would prefer esmolol for rate control if rapid Afib develops. Cardiology consulted. · Resp: aspiration precautions  · GI: advance to ADA diet with aspiration precautions; PPI  · Renal: LISBETH despite fluids, likely 2/2 intrinsic injury with sepsis. · ID: psoas abscess, seems to be a gram positive (?strep/enterococcus blood and aspirate, awaiting speciation. On empiric vanc, cefepime, and flagyl (added for possible anaerobic coverage)  · Heme: SCDs. Hold chemical prophylaxis with recent bleed and procedure  · Endo: Continue HS Lantus, add AM dose with hyperglycemia due to steroids. Sliding scale. 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     Note written by Dr. Jose Pineda, edited by me    Angelique Boroks MD  PCCM  40 min          The patient is: [x] acutely ill Risk of deterioration: [x] moderate    [] critically ill  [] high     [x]See my orders for details    My assessment/plan was discussed with:  [x]nursing []PT/OT    []respiratory therapy []   [x]family []     [x]Total critical care time exclusive of procedures   35    minutes    Gary Khan MD

## 2017-04-21 NOTE — DIABETES MGMT
GLYCEMIC CONTROL PLAN OF CARE    Assessment/Recommendations:  Pt discussed in interdisciplinary rounds. Blood glucose elevated above targets. Morning dose of Lantus insulin added and pt advanced to the very insulin resistant correctional Lispro scale. Diet advanced to a diabetic diet. Continue inpatient monitoring and intervention. Most recent blood glucose values:  4/20/2017 07:48 4/20/2017 12:01 4/20/2017 22:19 4/21/2017 00:42 4/21/2017 05:31   206 (H) 187 (H) 230 (H) 253 (H) 222 (H)     Current A1C of 8.0% is equivalent to average blood glucose of 183 mg/dl over the past 2-3 months.     Current hospital diabetes medications:   Lantus insulin 10 units daily and 14 units nightly  Correctional Lispro insulin every 6 hours (advanced to very insulin resistant scale)    Previous day's insulin requirements:   Lantus insulin 14 units  Lispro insulin 8 units    Home diabetes medications:  Januvia  Lantus insulin 15 units nightly   Novolog insulin per sliding scale    Diet:  Diabetic Consistent Carbohydrate    Education:  __x__Refer to Diabetes Education Record  (3/31/17)            ____Education not indicated at this time      Cata Ta RD, CDE

## 2017-04-21 NOTE — PROCEDURES
[facility] [place] [facility]  Central Line Procedure Note    Indication: Need for vasopressors      Risks, benefits, alternatives explained and consent obtained. Patient positioned in Trendelenburg. Central line Bundle:    Hand hygiene performed. Full sterile barrier precautions used. 7-Step Sterility Protocol followed. (cap, mask sterile gown, sterile gloves, large sterile sheet, hand hygiene, 2% chlorhexidine for cutaneous antisepsis)  5 mL 1% Lidocaine placed at insertion site. Using ultrasound guidance,   Left femoral cannulated x 1 attempt(s) utilizing the modified Seldinger technique. no  Good blood return. Catheter secured & Biopatch applied. Sterile Tegaderm placed.    yes      . me  9:35 PM

## 2017-04-21 NOTE — CONSULTS
[facility]  Pulmonary, Critical Care, and Sleep Medicine    Name: Antoine Angulo MRN: [id number]   : 1950 Hospital: [address] [place] [address]   Date: 2017        Critical Care Initial Patient Consult      IMPRESSION:   · Psoas abscess (staph vs. Strep on gram stain)  · Sepsis (severe sepsis based on old classification)  · ?septic shock (has not yet completed 30 ml/kg bolus)  · Paraplegia  · Recent psoas hematoma  · LISBETH  · NSTEMI   · Hx of CHF and Afib      RECOMMENDATIONS:   · Neuro: paraplegia likely 2/2 abscess. Steroids started. Abscess drained by IR  · CV: complete fluid bolus resuscitaton. Start vaso (with Hx of Afib); vaso plus IVF may be enough; titrate NE to MAP >65. Serial troponins. EKG paced. Will likely require cardiac risk stratification. Hold BP meds. Would prefer esmolol for rate control if rapid Afib develops  · Resp: aspiration precautions  · GI: NPO for now; PPI  · Renal: LISBETH, receiving IVF bolus now. Gentle hydration after bolus. · ID: psoas abscess, seems to be a gram positive. On empiric vanc, cefepime, and flagyl (added for possible anaerobic coverage)  · Heme: SCDs. Hold chemical prophy with recent bleed and procedure  · Endo: glucose controlled though starting decadron. Will start home dose of lantus now. May need to increase. Sliding scale. CVC placed  Full code    Trini Rucker MD  PCCM  60 min     Subjective/History: This patient has been seen and evaluated for sepsis. Patient is a 77 y.o. female w/hx of HTN, CHF, heart block s/p pacer, multiple vascular procedures in neck veins with residual stenoses, prior Hx of DVT, DM, Afib, recent admission for Rt psoas hematoma. Was readmitted today for acute paraplegia, fevers, sweats. Repeat CT demonstrated increased size of psoas fluid collection. Labs notable for leukocytosis and LISBETH. Pt brought to IR and 300ml of pus aspirated. Hypotensive upon return to floor, and still so after ~1500 ml IVF.      The patient is critically ill and can not provide additional history due to is providing Hx. Past Medical History:   Diagnosis Date    Benign hypertensive heart disease with systolic CHF, NYHA class 2 ([facility] 9/5/2012    Biventricular implantable cardioverter-defibrillator in situ 04/28/2005    Upgraded to BiV AICD; gen change 4/2008; pocket revision 10/2009; Abdominal - done on 8/22/2012 by Dr. Radha Mccray Cardiac cath 08/15/1996    Patent coronaries. Elev LVEDP. EF 50-55%.  Cardiac echocardiogram 06/23/2015    Ltd study. EF 45-50%. Mild, diffuse hypk. Severe apical hypk. No mass or thrombus was clearly identified, although imaging was suboptimal.      Cardiac nuclear imaging test 06/19/2015    Fixed distal apical, distal septal defect more likely due to RV pacing than prior infarct. No ischemia. EF 46%. RWMA c/w RV pacing. Nondiagnostic EKG on pharm stress test.      Cardiovascular lower extremity venous duplex 09/04/2012    Acute, non-occlusive DVT in CFV on right. No DVT on left. No superficial thrombosis bilaterally.  Cardiovascular upper extremity venous duplex 08/27/2012    DVT in axillary vein on left. Left subclavian was not visualized.     Chronic anemia 9/5/2012    Chronic systolic heart failure (HCC)     Decreased calculated glomerular filtration rate (GFR) 3/30/2017    Calculated GFR equivalent to that of CKD stage 3 = 30-59 ml/min    Diabetic neuropathy associated with type 2 diabetes mellitus ([facility] [address] 6/28/2011    Difficult airway for intubation 08/22/2012    see anesthesia airway note    Dyslipidemia 6/28/2011    Gout     History of complete heart block 6/28/2011    History of Coumadin therapy     Anticoagulation for DVT of the LUE; Discontinued on 3/30/2017    History of deep venous thrombosis 9/5/2012    Left upper extremity    History of pyelonephritis 3/30/2017    Left bundle branch block (LBBB) on electrocardiogram 6/28/2011    Nonischemic cardiomyopathy ([facility] 6/28/2011    Obesity (BMI 35.0-39.9 without comorbidity) ([facility] 3/13/2017    Obstructive sleep apnea on CPAP 2/7/2012    Psoas hematoma, right, secondary to anticoagulant therapy 3/30/2017    Type 2 diabetes mellitus with diabetic neuropathy ([facility] 6/28/2011      Past Surgical History:   Procedure Laterality Date    HX CARPAL TUNNEL RELEASE  4/07    right     HX CHOLECYSTECTOMY  1994    HX HYSTERECTOMY  1973    HX OTHER SURGICAL  6/11/2012    AICD revision    HX PACEMAKER  4/28/2005    [employer] AICD      Prior to Admission medications    Medication Sig Start Date End Date Taking? Authorizing Provider   gabapentin (NEURONTIN) 400 mg capsule Take 1 Cap by mouth two (2) times a day. Indications: NEUROPATHIC PAIN 4/19/17   Darvin Osler, MD   cholecalciferol (VITAMIN D3) 1,000 unit tablet Take 2 Tabs by mouth daily. Indications: PREVENTION OF VITAMIN D DEFICIENCY 4/19/17   Darvin Osler, MD   SITagliptin (JANUVIA) 50 mg tablet Take 50 mg by mouth daily. Indications: type 2 diabetes mellitus    Historical Provider   potassium chloride (KLOR-CON M20) 20 mEq tablet Take 20 mEq by mouth two (2) times a day. Indications: HYPOKALEMIA PREVENTION    Historical Provider   ondansetron (ZOFRAN ODT) 4 mg disintegrating tablet Take 4 mg by mouth every eight (8) hours as needed for Nausea. Historical Provider   cyclobenzaprine (FLEXERIL) 10 mg tablet Take 10 mg by mouth as needed for Muscle Spasm(s). Indications: MUSCLE SPASM    Historical Provider   carvedilol (COREG) 6.25 mg tablet Take 1 Tab by mouth two (2) times daily (with meals). Indications: hypertension 4/18/17   Darvin Osler, MD   acetaminophen (TYLENOL) 325 mg tablet Take 2 Tabs by mouth every four (4) hours as needed (for fever or pain level less than 5/10). Indications: Fever, Pain 4/18/17   Darvin Osler, MD   allopurinol (ZYLOPRIM) 100 mg tablet Take 1 Tab by mouth daily.  Indications: GOUT 4/18/17   Darvin Osler, MD   insulin glargine (LANTUS) 100 unit/mL injection 15 Units by SubCUTAneous route nightly. Indications: type 2 diabetes mellitus 4/18/17   Kristine Felipe MD   docusate sodium (COLACE) 100 mg capsule Take 1 Cap by mouth daily (after breakfast). Indications: Constipation 4/18/17   Kristine Felipe MD   senna-docusate (PERICOLACE) 8.6-50 mg per tablet Take 2 Tabs by mouth daily (after dinner). Indications: Constipation 4/18/17   Kristine Felipe MD   oxyCODONE-acetaminophen (PERCOCET 7.5) 7.5-325 mg per tablet Take 1 Tab by mouth every four (4) hours as needed (for pain level greater than 4/10). Max Daily Amount: 6 Tabs. Indications: Pain 4/18/17   Kristine Felipe MD   [facility]) 1 mg tablet TAKE 1 TABLET TWICE A DAY 4/14/17   [facility],    pravastatin (PRAVACHOL) 40 mg tablet Take 40 mg by mouth nightly. Indications: DYSLIPIDEMIA    Historical Provider   ferrous sulfate 325 mg (65 mg iron) tablet Take 1 Tab by mouth two (2) times daily (with meals).  9/11/12   Kristine Felipe MD   insulin aspart (NOVOLOG) 100 unit/mL injection INITIATE INSULIN CORRECTIVE PROTOCOL (HR): Normal Insulin Sensitivity  For Blood Sugar (mg/dL) of:    Less than 150 =   0 units          150 -199 =   2 units 200 -249 =   4 units 250 -299 =   6 units 300 -349 =   8 units 350 and above =   10 units If 2 glucose readings are above 200 mg/dL 9/5/12   LUDMILA Osuna     Current Facility-Administered Medications   Medication Dose Route Frequency    [START ON 4/21/2017] cholecalciferol (VITAMIN D3) tablet 2,000 Units  2,000 Units Oral DAILY    [START ON 4/21/2017] docusate sodium (COLACE) capsule 100 mg  100 mg Oral DAILY AFTER BREAKFAST    gabapentin (NEURONTIN) capsule 400 mg  400 mg Oral BID    pravastatin (PRAVACHOL) tablet 40 mg  40 mg Oral QHS    senna-docusate (PERICOLACE) 8.6-50 mg per tablet 2 Tab  2 Tab Oral PCD    0.9% sodium chloride infusion  125 mL/hr IntraVENous CONTINUOUS    [START ON 4/21/2017] allopurinol (ZYLOPRIM) tablet 100 mg  100 mg Oral DAILY    clotrimazole (MYCELEX) frances 10 mg  10 mg Oral 5XD    [START ON 8/16/3372] folic acid (FOLVITE) tablet 1 mg  1 mg Oral DAILY    dilTIAZem (CARDIZEM) 100 mg in 0.9% sodium chloride (MBP/ADV) 100 mL infusion  15 mg/hr IntraVENous TITRATE    dilTIAZem (CARDIZEM) 5 mg/mL injection        0.9% sodium chloride (MBP/ADV) 0.9 % infusion        cefepime (MAXIPIME) 2 g in 0.9% sodium chloride (MBP/ADV) 100 mL MBP  2 g IntraVENous Q12H    diphenhydrAMINE (BENADRYL) 50 mg/mL injection        VANCOMYCIN INFORMATION NOTE   Other CONTINUOUS    dexamethasone (DECADRON) 4 mg/mL injection 4 mg  4 mg IntraVENous Q6H    insulin lispro (HUMALOG) injection   SubCUTAneous Q6H    NOREPINephrine (LEVOPHED) 1 mg/mL injection        sodium chloride 0.9 % bolus infusion 1,000 mL  1,000 mL IntraVENous ONCE    metroNIDAZOLE (FLAGYL) IVPB premix 500 mg  500 mg IntraVENous Q8H    albumin human 25% (BUMINATE) solution 62.5 g  62.5 g IntraVENous ONCE    NOREPINephrine (LEVOPHED) 8,000 mcg in dextrose 5% 250 mL infusion  2-16 mcg/min IntraVENous TITRATE     Allergies   Allergen Reactions    Vancomycin Itching    Ampicillin Itching    Bactrim [Sulfamethoxazole-Trimethoprim] Unknown (comments)    Blueberry Swelling     Causes throat swelling    Ciprofloxacin Itching    Codeine Other (comments)     Jumpy feeling    Crestor [Rosuvastatin] Itching    Darvocet A500 [Propoxyphene N-Acetaminophen] Itching    Demerol [Meperidine] Itching    Levaquin [Levofloxacin] Itching    Lipitor [Atorvastatin] Myalgia    Magnesium Oxide Itching     nausea    Minocin [Minocycline] Unknown (comments)    Pcn [Penicillins] Itching    Pravachol [Pravastatin] Swelling     Swelling in mouth     Sulfa (Sulfonamide Antibiotics) Itching    Ultracet [Tramadol-Acetaminophen] Itching    Vicodin [Hydrocodone-Acetaminophen] Unknown (comments)    Vytorin 10-10 [Ezetimibe-Simvastatin] Myalgia    Percodan [Oxycodone Hcl-Oxycodone-Asa] Itching      Social History   Substance Use Topics    Smoking status: Never Smoker    Smokeless tobacco: Never Used    Alcohol use No      Family History   Problem Relation Age of Onset    Cancer Father      Leukemia        Review of Systems:  A comprehensive review of systems was negative except for that written in the HPI.     Objective:   Vital Signs:    Visit Vitals    BP (!) 75/42    Pulse (!) 104    Resp 30    Wt 113.4 kg (250 lb)    SpO2 93%    BMI 39.16 kg/m2       O2 Device: Nasal cannula   O2 Flow Rate (L/min): 2 l/min   Temp (24hrs), Av.6 °F (38.1 °C), Min:98.6 °F (37 °C), Max:101.9 °F (38.8 °C)       Intake/Output:   Last shift:         Last 3 shifts:    No intake or output data in the 24 hours ending 17      Ventilator Settings:  Mode Rate Tidal Volume Pressure FiO2 PEEP                    Peak airway pressure:      Minute ventilation:                                                        Data:     Recent Results (from the past 24 hour(s))   HGB & HCT    Collection Time: 17  6:42 AM   Result Value Ref Range    HGB 9.0 (L) 12.0 - 16.0 g/dL    HCT 26.4 (L) 35.0 - 45.0 %   PROTHROMBIN TIME + INR    Collection Time: 17  6:42 AM   Result Value Ref Range    Prothrombin time 16.3 (H) 11.5 - 15.2 sec    INR 1.4 (H) 0.8 - 1.2     METABOLIC PANEL, BASIC    Collection Time: 17  6:42 AM   Result Value Ref Range    Sodium 132 (L) 136 - 145 mmol/L    Potassium 4.5 3.5 - 5.5 mmol/L    Chloride 95 (L) 100 - 108 mmol/L    CO2 28 21 - 32 mmol/L    Anion gap 9 3.0 - 18 mmol/L    Glucose 188 (H) 74 - 99 mg/dL    BUN 23 (H) 7.0 - 18 MG/DL    Creatinine 1.90 (H) 0.6 - 1.3 MG/DL    BUN/Creatinine ratio 12 12 - 20      GFR est AA 32 (L) >60 ml/min/1.73m2    GFR est non-AA 26 (L) >60 ml/min/1.73m2    Calcium 8.7 8.5 - 10.1 MG/DL   GLUCOSE, POC    Collection Time: 17  7:48 AM   Result Value Ref Range    Glucose (POC) 206 (H) 70 - 110 mg/dL   EKG, 12 LEAD, INITIAL    Collection Time: 04/20/17  9:16 AM   Result Value Ref Range    Ventricular Rate 114 BPM    Atrial Rate 114 BPM    P-R Interval 114 ms    QRS Duration 174 ms    Q-T Interval 380 ms    QTC Calculation (Bezet) 523 ms    Calculated P Axis 41 degrees    Calculated R Axis 125 degrees    Calculated T Axis 119 degrees    Diagnosis       Electronic ventricular pacemaker  When compared with ECG of 30-MAR-2017 11:29,  Vent. rate has increased BY  38 BPM  Confirmed by Marshall Miller MD, Marie Heath ([id number]) on 4/20/2017 5:14:49 PM     LACTIC ACID, PLASMA    Collection Time: 04/20/17  9:23 AM   Result Value Ref Range    Lactic acid 1.2 0.4 - 2.0 MMOL/L   CBC WITH AUTOMATED DIFF    Collection Time: 04/20/17  9:23 AM   Result Value Ref Range    WBC 21.2 (H) 4.6 - 13.2 K/uL    RBC 3.40 (L) 4.20 - 5.30 M/uL    HGB 9.0 (L) 12.0 - 16.0 g/dL    HCT 26.2 (L) 35.0 - 45.0 %    MCV 77.1 74.0 - 97.0 FL    MCH 26.5 24.0 - 34.0 PG    MCHC 34.4 31.0 - 37.0 g/dL    RDW 15.0 (H) 11.6 - 14.5 %    PLATELET 632 022 - 331 K/uL    MPV 8.6 (L) 9.2 - 11.8 FL    NEUTROPHILS 88 (H) 42 - 75 %    BAND NEUTROPHILS 2 0 - 5 %    LYMPHOCYTES 3 (L) 20 - 51 %    MONOCYTES 7 2 - 9 %    EOSINOPHILS 0 0 - 5 %    BASOPHILS 0 0 - 3 %    ABS. NEUTROPHILS 19.1 (H) 1.8 - 8.0 K/UL    ABS. LYMPHOCYTES 0.6 (L) 0.8 - 3.5 K/UL    ABS. MONOCYTES 1.5 (H) 0 - 1.0 K/UL    ABS. EOSINOPHILS 0.0 0.0 - 0.4 K/UL    ABS.  BASOPHILS 0.0 0.0 - 0.06 K/UL    DF MANUAL      PLATELET COMMENTS ADEQUATE PLATELETS      RBC COMMENTS ANISOCYTOSIS  1+       CARDIAC PANEL,(CK, CKMB & TROPONIN)    Collection Time: 04/20/17  9:33 AM   Result Value Ref Range     (H) 26 - 192 U/L    CK - MB 1.4 <3.6 ng/ml    CK-MB Index 0.3 0.0 - 4.0 %    Troponin-I, Qt. 0.69 (H) 0.0 - 0.045 NG/ML   POC LACTIC ACID    Collection Time: 04/20/17 10:24 AM   Result Value Ref Range    Lactic Acid (POC) 1.2 0.4 - 2.0 mmol/L   URINALYSIS W/MICROSCOPIC    Collection Time: 04/20/17 10:26 AM   Result Value Ref Range    Color YELLOW Appearance CLOUDY      Specific gravity 1.018 1.005 - 1.030      pH (UA) 5.0 5.0 - 8.0      Protein TRACE (A) NEG mg/dL    Glucose NEGATIVE  NEG mg/dL    Ketone TRACE (A) NEG mg/dL    Bilirubin NEGATIVE  NEG      Blood NEGATIVE  NEG      Urobilinogen 0.2 0.2 - 1.0 EU/dL    Nitrites NEGATIVE  NEG      Leukocyte Esterase TRACE (A) NEG      WBC 0 to 3 0 - 4 /hpf    Epithelial cells FEW 0 - 5 /lpf    Bacteria 4+ (A) NEG /hpf   GLUCOSE, POC    Collection Time: 04/20/17 12:01 PM   Result Value Ref Range    Glucose (POC) 187 (H) 70 - 110 mg/dL   CULTURE, BODY FLUID W GRAM STAIN    Collection Time: 04/20/17  6:08 PM   Result Value Ref Range    Special Requests: MUSCLE DRAINAGE RIGHT PSOAS     GRAM STAIN MANY  WBC'S        GRAM STAIN FEW  GRAM POSITIVE COCCI  IN PAIRS        GRAM STAIN MANY  GRAM POSITIVE COCCI  IN CHAINS        GRAM STAIN       CALLED TO [facility] RN ICU AT 2000 [id number]. REPEATED TO [id number]. Culture result: PENDING    TROPONIN I    Collection Time: 04/20/17  7:26 PM   Result Value Ref Range    Troponin-I, Qt. 1.67 (HH) 0.0 - 0.045 NG/ML   TSH 3RD GENERATION    Collection Time: 04/20/17  7:26 PM   Result Value Ref Range    TSH 0.51 0.36 - 3.74 uIU/mL           No results for input(s): FIO2I, IFO2, HCO3I, IHCO3, HCOPOC, PCO2I, PCOPOC, IPHI, PHI, PHPOC, PO2I, PO2POC in the last 72 hours.     No lab exists for component: IPOC2  Telemetry:normal sinus rhythm    Imaging:  I have personally reviewed the patients radiographs and have reviewed the reports:  As above   Best practice :  Core measures:  [facility]  [facility] Sepsis Core Measures      Completed physical exam:yes    Latest lactic acid:   Lactic acid   Date Value Ref Range Status   04/20/2017 1.2 0.4 - 2.0 MMOL/L Final       Vital Signs:    Visit Vitals    BP (!) 75/42    Pulse (!) 104    Resp 30    Wt 113.4 kg (250 lb)    SpO2 93%    BMI 39.16 kg/m2       O2 Device: Nasal cannula   O2 Flow Rate (L/min): 2 l/min   Temp (24hrs), Av.6 °F (38.1 °C), Min:98.6 °F (37 °C), Max:101.9 °F (38.8 °C)       Patient Vitals for the past 8 hrs:   Pulse Resp BP SpO2   17 1815 (!) 104 30 (!) 75/42 93 %   17 1810 (!) 104 26 (!) 77/39 94 %   17 1805 (!) 101 30 (!) 81/38 92 %   17 1800 (!) 103 22 (!) 78/40 93 %   17 1755 (!) 101 (!) 35 (!) 80/39 95 %   17 1750 (!) 101 (!) 35 (!) 78/40 93 %   17 1745 98 (!) 32 92/41 93 %   17 1740 (!) 104 30 92/45 92 %   17 1738 (!) 101 (!) 35 (!) 84/52 92 %   17 1700 (!) 190 - 154/88 -   17 1647 (!) 103 - 134/80 -         Physical Examination:  General:  Alert and oriented to all spheres   Heart[de-identified]  regular rate and rhythm   Lungs:  normal ; breath sounds clear and equal bilaterally   Skin: Warm and dry. no hyperpigmentation, vitiligo, or suspicious lesions   Peripheral Pulse: Bilateral, 1+   Capillary refill: Normal  Paraplegic       Completed IVF Resuscitation (30ml/kg) - not yet  IVF choice: NS    Suspected source/s of severe sepsis: Psoas abscess     Organ dysfunction: Circulatory, renal    Antibiotics: Vanc, cefepime, flagyl      Vasopressors: vaso and NE      Britney Domingo MD        Glycemic control  [facility]. Ventilated patients- aim to keep peak plateau pressure 11-02PL H2O.   Sress ulcer prophylaxis  DVT prophylaxis       Central Line Bundle Followed        Total critical care time exclusive of procedures: 60 minutes  Britney Domingo MD

## 2017-04-21 NOTE — PROGRESS NOTES
attended the interdisciplinary rounds for Jerod Luo, who is a 77 y.o.,female. Patients Primary Language is: Jade Vickie. According to the patients EMR Yarsanism Affiliation is: Nellie Vega. The reason the Patient came to the hospital is:   Patient Active Problem List    Diagnosis Date Noted    Acute paraplegia ([facility] 04/20/2017    Sepsis ([address] 04/20/2017    Gout     History of Coumadin therapy     Decreased calculated glomerular filtration rate (GFR) 03/30/2017    History of pyelonephritis 03/30/2017    Iliopsoas muscle hematoma 03/30/2017    Psoas hematoma, right, secondary to anticoagulant therapy 03/30/2017    Impaired mobility and ADLs 03/30/2017    Obesity (BMI 35.0-39.9 without comorbidity) ([address] 03/13/2017    Chronic systolic heart failure ([address]     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    Benign hypertensive heart disease with systolic CHF, NYHA class 2 ([facility] 09/05/2012    Chronic anemia 09/05/2012    History of deep venous thrombosis 09/05/2012    Anticoagulated on Coumadin 09/05/2012    Difficult airway for intubation 08/22/2012    AICD generator infection ([address] 08/20/2012    Obstructive sleep apnea on CPAP 02/07/2012    Nonischemic cardiomyopathy ([address] 06/28/2011    History of complete heart block 06/28/2011    Left bundle branch block (LBBB) on electrocardiogram 06/28/2011    Type 2 diabetes mellitus with diabetic neuropathy ([facility] 06/28/2011    Dyslipidemia 06/28/2011    Diabetic neuropathy associated with type 2 diabetes mellitus ([facility] 06/28/2011    Biventricular implantable cardioverter-defibrillator in situ 04/28/2005      Plan:  Chaplains will continue to follow and will provide pastoral care on an as needed/requested basis.  recommends bedside caregivers page  on duty if patient shows signs of acute spiritual or emotional distress.     [address] [facility]  Board Certified [address] [facility]   [phone]

## 2017-04-21 NOTE — PROGRESS NOTES
Re:  Jah Gonzalez,Follow up visit     4/21/2017 12:48 PM    SSN: [id number]    Subjective:   Alexander Show seen with her daughter in the room. She feels musch better than yesterday.     Medications:    Current Facility-Administered Medications   Medication Dose Route Frequency Provider Last Rate Last Dose    insulin glargine (LANTUS) injection 10 Units  10 Units SubCUTAneous DAILY Christal Devlin MD   10 Units at 04/21/17 1214    gentamicin (GARAMYCIN) 160 mg in 0.9% sodium chloride 100 mL IVPB  160 mg IntraVENous Shivam Cloud MD       [facility] [START ON 4/22/2017] Gentamicin in Saline (Iso-osm) (GARAMYCIN) 100 mg/100 mL IVPB premix 100 mg  100 mg IntraVENous Q24H Farrah Nelson MD        cholecalciferol (VITAMIN D3) tablet 2,000 Units  2,000 Units Oral DAILY Kevin Bergeron MD   2,000 Units at 04/21/17 1122    docusate sodium (COLACE) capsule 100 mg  100 mg Oral DAILY AFTER BREAKFAST Kevin Bergeron MD   100 mg at 04/21/17 1122    gabapentin (NEURONTIN) capsule 400 mg  400 mg Oral BID Kevin Bergeron MD   400 mg at 04/21/17 1122    oxyCODONE-acetaminophen (PERCOCET 7.5) 7.5-325 mg per tablet 1 Tab  1 Tab Oral Q4H PRN Kevin Bergeron MD   1 Tab at 04/20/17 1654    pravastatin (PRAVACHOL) tablet 40 mg  40 mg Oral QHS Kevin Bergeron MD   40 mg at 04/20/17 2214    senna-docusate (PERICOLACE) 8.6-50 mg per tablet 2 Tab  2 Tab Oral PCD Kevin Bergeron MD   2 Tab at 04/20/17 1800    acetaminophen (TYLENOL) tablet 500 mg  500 mg Oral Q6H PRN Kevin Bergeron MD   500 mg at 04/20/17 1630    allopurinol (ZYLOPRIM) tablet 100 mg  100 mg Oral DAILY Kevin Bergeron MD   100 mg at 04/21/17 1122    clotrimazole (MYCELEX) frances 10 mg  10 mg Oral 5XD Kevin Bergeron MD   Stopped at 85/22/12 6312    folic acid (FOLVITE) tablet 1 mg  1 mg Oral DAILY Kevin Bergeron MD   1 mg at 04/21/17 1122    ondansetron (ZOFRAN) injection 4 mg  4 mg IntraVENous Q8H PRN Kevin Bergeron MD        diphenhydrAMINE (BENADRYL) injection 25 mg  25 mg IntraVENous Q4H PRN Kamron West MD        VANCOMYCIN INFORMATION NOTE   Other CONTINUOUS Kamron West  mL/hr at 04/20/17 1730      dexamethasone (DECADRON) 4 mg/mL injection 4 mg  4 mg IntraVENous Q6H Vikas Beltre MD   4 mg at 04/21/17 1246    insulin lispro (HUMALOG) injection   SubCUTAneous Q6H Vikas Beltre MD   6 Units at 04/21/17 1244    glucose chewable tablet 16 g  16 g Oral PRN Vikas Beltre MD        glucagon (GLUCAGEN) injection 1 mg  1 mg IntraMUSCular PRN Vikas Beltre MD        dextrose (D50W) injection syrg 12.5-25 g  25-50 mL IntraVENous PRN Vikas Beltre MD        metroNIDAZOLE (FLAGYL) IVPB premix 500 mg  500 mg IntraVENous Q8H Malia Mcarthur  mL/hr at 04/21/17 0445 500 mg at 04/21/17 0445    pantoprazole (PROTONIX) 40 mg in sodium chloride 0.9 % 10 mL injection  40 mg IntraVENous DAILY Malia Mcarthur MD   40 mg at 04/21/17 1121    insulin glargine (LANTUS) injection 14 Units  14 Units SubCUTAneous QHS Malia Mcarthur MD   14 Units at 04/20/17 2228       Vital signs:    Visit Vitals    /61    Pulse 70    Temp 97.1 °F (36.2 °C)    Resp 16    Wt 113.4 kg (250 lb)    SpO2 100%    BMI 39.16 kg/m2       Review of Systems:   As above otherwise 11 point review of systems negative including;   Constitutional no fever or chills  Skin denies rash or itching  HEENT  Denies tinnitus, hearing lose  Eyes denies diplopia vision lose  Respiratory denies sortness of breath  Cardiovascular denies chest pain, dyspnea on exertion  Gastrointestinal denies nausea, vomiting, diarrhea, constipation  Genitourinary denies incontinence, perez in place. No bowel movements.     Musculoskeletal denies joint pain or swelling  Endocrine denies weight change  Hematology denies easy bruising or bleeding   Neurological as above in HPI      Patient Active Problem List   Diagnosis Code    Nonischemic cardiomyopathy ([facility] I42.8    History of complete heart block Z86.79    Biventricular implantable cardioverter-defibrillator in situ Z95.810    Left bundle branch block (LBBB) on electrocardiogram I44.7    Type 2 diabetes mellitus with diabetic neuropathy ([facility]) E11.40    Dyslipidemia E78.5    Diabetic neuropathy associated with type 2 diabetes mellitus [facility] E11.40    Obstructive sleep apnea on CPAP G47.33, Z99.89    AICD generator infection ([facility] T82. 7XXA    Difficult airway for intubation T88. 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 1XXA    Impaired mobility and ADLs Z74.09    History of Coumadin therapy Z79.01    Gout M10.9    Acute paraplegia ([facility]) G82.20    Sepsis ([facility]) A41.9         Objective: The patient is awake, alert, and oriented x 4. Fund of knowledge is adequate. Speech is fluent and memory is intact. Cranial Nerves: II  Visual fields are full to confrontation. III, IV, VI  Extraocular movements are intact. There is no nystagmus. V  Facial sensation is intact to pinprick. VII  Face is symmetrical.  VIII - Hearing is present. IX, X, XII  Palate is symmetrical.   XI - Shoulder shrugging and head turning intact  Motor: The patient is plegic in both legs, no movement at all. Tone is flaccid in the legs. Plantars are mute. Gait is testing can't be preformed because of condition. Currently has a T9 sensory level bilaterally.     Final result (Exam End: 4/20/2017  2:15 PM) Reviewed    Study Result   CT scan of thoracic spine with intravenous contrast:           INDICATION:     History of pyelonephritis. Possible epidural abscess.           TECHNIQUE:     Following intravenous administration of 60 cc of Isovue-300 contrast, contiguous  2.5 mm the axial sections of thoracic spine are obtained.     Sagittal and coronal images reformatted.     All CT scans at this facility are performed using dose optimization technique as  appropriate to a performed examination, to include automated exposer control,  adjustment mA and / or KV according to patient size (including appropriate  matching for site specific examination), or use of iterative reconstruction  technique.     COMPARISON STUDY:     None.           FINDINGS:           There is no finding suspicious for epidural abscess in thoracic spinal canal.     At the basal lungs bilaterally there are mild-to-moderate atelectatic changes,  and sagittal infiltrates demonstrated. There is no obvious pleural effusion in  either side.     There is no evidence of any prevertebral or paravertebral abnormal soft tissue  density or abnormal fluid collection around thoracic spine.     The thoracic vertebral bodies are of normal heights.     In upper thoracic spine there are findings of minimal multilevel degenerative  disc disease without disc protrusion or herniation. The spinal canal is not  compromised.     In the mid thoracic spine there are findings of mild-to-moderate multilevel  degenerative disc disease with anterior mild osteophytosis from end plates. Posteriorly there is no evidence of disc bulge or disc protrusion. Thoracic  spinal canal is not compromised.     The lower thoracic spine at T9-T10, T10-T11, and T11-T12 level there are  findings of mild to moderate degenerative disc disease, most obvious at T11-T12  level. There is moderate anterior osteophytosis at T11-T12 level.  But there is  no evidence of any definable focal protrusion of disc without herniation of disc  at these levels. Thoracic spinal canal is not compromised. There is no definable  prevertebral or paravertebral soft tissue abnormality around lower thoracic  spine or around thoracolumbar junction.           IMPRESSION  IMPRESSION:     No evidence of epidural abscess or epidural hematoma in thoracic spinal canal.     There is no evidence of stenosis of thoracic spinal canal.     There are findings of mild multilevel degenerative disc disease in upper  thoracic spine and mild to moderate multilevel degenerative disc disease in mid  and lower thoracic spine, without evidence of disc herniation or protrusion.     There is no evidence of destructive process or osteomyelitis in thoracic spine.     At the posterior bases of both lungs there are mild-to-moderate atelectatic  changes, and/or infiltrates demonstrated. Final result (Exam End: 4/20/2017  2:15 PM) Reviewed    Study Result   CT scan of lumbar spine, with intravenous contrast:           INDICATION:     History of pyelonephritis. Evaluation for possible epidural abscess. History of hematoma in right psoas muscle.        TECHNIQUE:     Multidetector CT scan with 2.5 mm slice thickness, with intravenous 60 cc of  Isovue-300 contrast, including lumbar spine through mid sacrum.     Coronal and sagittal images reformatted.     All CT scans at this facility are performed using dose optimization technique as  appropriate to a performed examination, to include automated exposer control,  adjustment mA and / or KV according to patient size (including appropriate  matching for site specific examination), or use of iterative reconstruction  technique.     COMPARISON STUDY: CT scan of lumbar spine on 4/19/2017.     FINDINGS:     On previous CT scan of lumbar spine on 4/19/2017, there was evidence of marked  enlargement of right psoas muscle, presumably due to hematoma. At the mid level  of psoas muscle diabetes diameter was 6.4 cm and the transverse diameter was 6.3  cm. On current study, again, there is demonstration of markedly enlarged right  psoas muscle, with AP diameter of 6.6 cm and transverse diameter of 6.2 cm.     With intravenous contrast there appears to be enhancement in the upper portion  of sludge muscle. In the mid and lower portion also was muscle there is poor  enhancement. In the lower portion of psoas muscle at about L5 level there is an  area, which is not obviously enhance, measuring 3.5 cm AP and 5.3 cm transverse. This poor enhancement or non - enhancement continues to the inferior portion as  well as muscle. The finding is most likely due to hematoma in the psoas muscle. There is no obvious inflammatory process surrounding the right psoas muscle and  therefore an abscess would be only doubtful possibility.     The lumbar vertebral bodies are of normal heights. The disc space as at L4-L5  and L3-L4 level severe to be moderately narrowed.     At L5-S1 level the disc space is well-preserved. There is mild generalized disc  bulge without disc protrusion or herniation. In the facet joint there are mild  osteoarthritic changes. Central canal and the neural foramina are not  compromised.     At L3-L4 level the disc space is moderately narrowed. There is moderate  generalized disc bulge. In the facet joint there are moderate osteoarthritic  changes present. There is also ligamentum flavum hypertrophy. The central canal  appears to be moderately stenosed. The neural foramina of both sides are mildly  stenosed, as before.     At L3-L4 level the disc space is mild to moderately narrowed. There is mild  generalized disc bulge without disc herniation or protrusion. Central canal is  mildly stenosed. Neural foramina of both sides are mildly stenosed. On previous  study in the L2-L4 disc base there was evidence of locules of gas or vacuum  phenomenon, but currently there is no evidence of any acute gas in the disc  space.  In the areas of neural foramina of both sides at this level there is no  obvious focal abnormality.     At L2-L3 level there are disc space is well-preserved. In the facet joint there  are mild osteoarthritic changes again demonstrated. On previous study there was  demonstration of soft tissue density in the area of the right neural foramen at  this level. Currently, again, there is soft tissue density with effacement of  fatty tissues in the right L2/L3 neural foramen. The soft tissue density appears  to be in continuity with the enlarged right psoas muscle. The finding is  suspicious for extension of inflammatory process or hemorrhage from right psoas  muscle into the right neural foramen. Whether there is extension of inflammatory  changes around the thecal sac is not certain and cannot be defined clearly on  the study. As compared to previous CT scan of lumbar spine on 4/19/2017, at this  level there is no obvious interval change.     At L1-L2 level the disc space is well-preserved. There is no disc bulge without  disc protrusion. At this level, there is also abnormal soft tissue density in  the right neural foramen suspicious for extension of inflammatory process. But  there is no definite finding suspicious for epidural abscess or epidural  hematoma at these levels.     At T12-L1 level there is no diagnostic finding.           IMPRESSIONS:     Redemonstration of markedly enlarged right psoas muscle, most likely due to  hematoma, similar to previous study on 4/19/2017. There is no clearly defined  wall abscess within the right psoas muscle. There is some ill-defined  hypodensity in most of the posterior portions of the right psoas muscle, most  likely indicating hematoma. Any abscess is only doubtful possibility.  If  clinically indicated, CT-guided aspiration of right psoas muscle may be  considered.     Redemonstration of ill-defined soft tissue density in the right neural foramina  at L2/L3 and L1-L2 levels with effacement of fatty tissues, in continuity with  the right psoas muscle. There is possibility of spread of inflammatory change or  hemorrhage from right psoas muscle into this neural foramina. It cannot be  defined clearly on this study whether there is any spread of inflammatory  changes around the thecal sac. There is no definable loculated or unenhanced  epidural abscess in the lumbar spinal canal or in the thoracic spinal canal. No  evidence of prevertebral or paravertebral abscess, otherwise. I have reviewed the above imagines myself. CBC:   Lab Results   Component Value Date/Time    WBC 23.4 04/21/2017 04:52 AM    RBC 2.95 04/21/2017 04:52 AM    HGB 7.6 04/21/2017 04:52 AM    HCT 22.7 04/21/2017 04:52 AM    PLATELET 190 29/85/1407 04:52 AM     BMP:   Lab Results   Component Value Date/Time    Glucose 219 04/21/2017 04:52 AM    Sodium 136 04/21/2017 04:52 AM    Potassium 4.0 04/21/2017 04:52 AM    Chloride 102 04/21/2017 04:52 AM    CO2 27 04/21/2017 04:52 AM    BUN 30 04/21/2017 04:52 AM    Creatinine 1.96 04/21/2017 04:52 AM    Calcium 8.2 04/21/2017 04:52 AM     CMP:   Lab Results   Component Value Date/Time    Glucose 219 04/21/2017 04:52 AM    Sodium 136 04/21/2017 04:52 AM    Potassium 4.0 04/21/2017 04:52 AM    Chloride 102 04/21/2017 04:52 AM    CO2 27 04/21/2017 04:52 AM    BUN 30 04/21/2017 04:52 AM    Creatinine 1.96 04/21/2017 04:52 AM    Calcium 8.2 04/21/2017 04:52 AM    Anion gap 7 04/21/2017 04:52 AM    BUN/Creatinine ratio 15 04/21/2017 04:52 AM    Alk.  phosphatase 144 04/21/2017 04:52 AM    Protein, total 6.4 04/21/2017 04:52 AM    Albumin 2.5 04/21/2017 04:52 AM    Globulin 3.9 04/21/2017 04:52 AM    A-G Ratio 0.6 04/21/2017 04:52 AM     Coagulation:   Lab Results   Component Value Date/Time    Prothrombin time 17.6 04/21/2017 04:52 AM    INR 1.5 04/21/2017 04:52 AM    aPTT 47.9 03/30/2017 05:35 PM     Cardiac markers:   Lab Results   Component Value Date/Time     04/20/2017 09:33 AM    CK-MB Index 0.3 04/20/2017 09:33 AM       Assessment:  Paraplegia in this patient with a right iliopsoas abscess, post drainage of 300 ml of purulent fluid. Also seems to be frankly septic at this time. 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 I did share this with the patient and her daughter as the probable etiology for her leg weakness. Plan:  Not much we can do to resolve her neurologic problem. Needs antibiotics. Will follow. Sincerely,        Ronda Hidalgo.  Lulu Peoples M.D.

## 2017-04-21 NOTE — PROGRESS NOTES
Infectious Disease Progress Note    Requested by: Dr. Candie Koch, dr. Terence Fong    Reason: sepsis, acute paraplegia    Current abx Prior abx   Cefepime, vancomycin [facility] 4/20      Lines:       Assessment :    77 y.o., right handed female, with an established history of hypertension, complete heart block with permanent pacemaker placed, diabetes mellitus, diabetic peripheral neuropathy, obesity, admitted to [facility] on 4/20/17. Clinical presentation consistent with  Sepsis (POA)  secondary to gram positive bloodstream infection. Most likely source of bloodstream infection is right abdominal pacemaker infection, infected right psoas hematoma with probable mass effect on spinal cord causing neurological compromise, probable epidural abscess. S/p ct guided drainage of hematoma on 4/20 with findings of 350 cc of pus - cultures gpc in pairs/chains  2 week h/o pain at the site of abdominal pacemaker, tenderness at the site likely suggest pacemaker infection. It is likely that patient had pacemaker colonization/infection from transient GI inflammation and persistent infection led to bacteremia and secondary seeding of the right psoas hematoma. Patient doesn't have erythema at the site of pacemaker likely since it is in deeper tissue    Patient's management is complicated due to inability to obtain MRI (not pacemaker compatible - i confirmed with cardiology team) and multiple antibiotic allergies including penicillin, vancomycin. Patient is currently tolerating cefepime, vancomycin with benadryl premedication    Increasing creatinine - monitor for renal failure    Clinically better. However, still unable to move her legs. Recommendations:    1. cont vancomycin, d/c cefepime. Start gentamicin for synergy in case gram positive organism turns out to be enterococcus. 2. cont decadron for now  3. Decision about surgical decompression per dr. Freed Bolus  4. F/u id of gpc in blood cultures  5. F/u abscess fluid cultures  6.  Repeat blood cultures in am    Thank you for consultation request. Above plan was discussed in details with cc team. . Please call me if any further questions or concerns. Will continue to participate in the care of this patient. subjective:    Feels better. Denies cp, sob. Unable to move legs. C/o pain at the site of pacemaker left abdomen. No nausea, vomiting. No new rash/itching/joint pain/back pain. Home Medication List    Details   gabapentin (NEURONTIN) 400 mg capsule Take 1 Cap by mouth two (2) times a day. Indications: NEUROPATHIC PAIN  Qty: 30 Cap, Refills: 0    Associated Diagnoses: Iliopsoas muscle hematoma, right, subsequent encounter      cholecalciferol (VITAMIN D3) 1,000 unit tablet Take 2 Tabs by mouth daily. Indications: PREVENTION OF VITAMIN D DEFICIENCY  Qty: 30 Tab, Refills: 0      SITagliptin (JANUVIA) 50 mg tablet Take 50 mg by mouth daily. Indications: type 2 diabetes mellitus      potassium chloride (KLOR-CON M20) 20 mEq tablet Take 20 mEq by mouth two (2) times a day. Indications: HYPOKALEMIA PREVENTION      ondansetron (ZOFRAN ODT) 4 mg disintegrating tablet Take 4 mg by mouth every eight (8) hours as needed for Nausea. cyclobenzaprine (FLEXERIL) 10 mg tablet Take 10 mg by mouth as needed for Muscle Spasm(s). Indications: MUSCLE SPASM      carvedilol (COREG) 6.25 mg tablet Take 1 Tab by mouth two (2) times daily (with meals). Indications: hypertension  Qty: 30 Tab, Refills: 0    Associated Diagnoses: Benign hypertensive heart disease with systolic CHF, NYHA class 2 (HCC)      acetaminophen (TYLENOL) 325 mg tablet Take 2 Tabs by mouth every four (4) hours as needed (for fever or pain level less than 5/10). Indications: Fever, Pain  Qty: 30 Tab, Refills: 0    Associated Diagnoses: Iliopsoas muscle hematoma, right, subsequent encounter      allopurinol (ZYLOPRIM) 100 mg tablet Take 1 Tab by mouth daily.  Indications: GOUT  Qty: 15 Tab, Refills: 0    Associated Diagnoses: Chronic gout, unspecified cause, unspecified site      insulin glargine (LANTUS) 100 unit/mL injection 15 Units by SubCUTAneous route nightly. Indications: type 2 diabetes mellitus  Qty: 1 Vial, Refills: 0    Associated Diagnoses: Type 2 diabetes mellitus with diabetic neuropathy, with long-term current use of insulin (HCC)      docusate sodium (COLACE) 100 mg capsule Take 1 Cap by mouth daily (after breakfast). Indications: Constipation  Qty: 15 Cap, Refills: 0      senna-docusate (PERICOLACE) 8.6-50 mg per tablet Take 2 Tabs by mouth daily (after dinner). Indications: Constipation  Qty: 30 Tab, Refills: 0      oxyCODONE-acetaminophen (PERCOCET 7.5) 7.5-325 mg per tablet Take 1 Tab by mouth every four (4) hours as needed (for pain level greater than 4/10). Max Daily Amount: 6 Tabs. Indications: Pain  Qty: 50 Tab, Refills: 0    Associated Diagnoses: Iliopsoas muscle hematoma, right, subsequent encounter      bumetanide (BUMEX) 1 mg tablet TAKE 1 TABLET TWICE A DAY  Qty: 180 Tab, Refills: 1      pravastatin (PRAVACHOL) 40 mg tablet Take 40 mg by mouth nightly. Indications: DYSLIPIDEMIA      ferrous sulfate 325 mg (65 mg iron) tablet Take 1 Tab by mouth two (2) times daily (with meals).   Qty: 30 Tab, Refills: 0      insulin aspart (NOVOLOG) 100 unit/mL injection INITIATE INSULIN CORRECTIVE PROTOCOL (HR): Normal Insulin Sensitivity  For Blood Sugar (mg/dL) of:    Less than 150 =   0 units          150 -199 =   2 units 200 -249 =   4 units 250 -299 =   6 units 300 -349 =   8 units 350 and above =   10 units If 2 glucose readings are above 200 mg/dL  Qty: 10 mL, Refills: 6             Current Facility-Administered Medications   Medication Dose Route Frequency    cholecalciferol (VITAMIN D3) tablet 2,000 Units  2,000 Units Oral DAILY    docusate sodium (COLACE) capsule 100 mg  100 mg Oral DAILY AFTER BREAKFAST    gabapentin (NEURONTIN) capsule 400 mg  400 mg Oral BID    oxyCODONE-acetaminophen (PERCOCET 7.5) 7.5-325 mg per tablet 1 Tab  1 Tab Oral Q4H PRN    pravastatin (PRAVACHOL) tablet 40 mg  40 mg Oral QHS    senna-docusate (PERICOLACE) 8.6-50 mg per tablet 2 Tab  2 Tab Oral PCD    acetaminophen (TYLENOL) tablet 500 mg  500 mg Oral Q6H PRN    allopurinol (ZYLOPRIM) tablet 100 mg  100 mg Oral DAILY    clotrimazole (MYCELEX) frances 10 mg  10 mg Oral 5XD    dextrose (D50W) injection syrg 12.5-25 g  25-50 mL IntraVENous PRN    diphenhydrAMINE (BENADRYL) injection 25 mg  25 mg IntraVENous S1Q PRN    folic acid (FOLVITE) tablet 1 mg  1 mg Oral DAILY    ondansetron (ZOFRAN) injection 4 mg  4 mg IntraVENous Q8H PRN    dilTIAZem (CARDIZEM) 100 mg in 0.9% sodium chloride (MBP/ADV) 100 mL infusion  15 mg/hr IntraVENous TITRATE    diphenhydrAMINE (BENADRYL) injection 25 mg  25 mg IntraVENous Q4H PRN    cefepime (MAXIPIME) 2 g in 0.9% sodium chloride (MBP/ADV) 100 mL MBP  2 g IntraVENous Q12H    VANCOMYCIN INFORMATION NOTE   Other CONTINUOUS    dexamethasone (DECADRON) 4 mg/mL injection 4 mg  4 mg IntraVENous Q6H    insulin lispro (HUMALOG) injection   SubCUTAneous Q6H    glucose chewable tablet 16 g  16 g Oral PRN    glucagon (GLUCAGEN) injection 1 mg  1 mg IntraMUSCular PRN    dextrose (D50W) injection syrg 12.5-25 g  25-50 mL IntraVENous PRN    NOREPINephrine (LEVOPHED) 1 mg/mL injection        metroNIDAZOLE (FLAGYL) IVPB premix 500 mg  500 mg IntraVENous Q8H    NOREPINephrine (LEVOPHED) 8,000 mcg in dextrose 5% 250 mL infusion  2-16 mcg/min IntraVENous TITRATE    pantoprazole (PROTONIX) 40 mg in sodium chloride 0.9 % 10 mL injection  40 mg IntraVENous DAILY    insulin glargine (LANTUS) injection 14 Units  14 Units SubCUTAneous QHS    vasopressin (VASOSTRICT) 100 Units in 0.9% sodium chloride 100 mL infusion  0.03 Units/min IntraVENous TITRATE       Allergies: Vancomycin; Ampicillin; Bactrim [sulfamethoxazole-trimethoprim]; Blueberry; Ciprofloxacin; Codeine; Crestor [rosuvastatin];  Darvocet a500 [propoxyphene n-acetaminophen]; Demerol [meperidine]; Levaquin [levofloxacin]; Lipitor [atorvastatin]; Magnesium oxide; Minocin [minocycline]; Pcn [penicillins]; Pravachol [pravastatin]; Sulfa (sulfonamide antibiotics); Ultracet [tramadol-acetaminophen]; Vicodin [hydrocodone-acetaminophen]; Vytorin 10-10 [ezetimibe-simvastatin]; and Percodan [oxycodone hcl-oxycodone-asa]    Temp (24hrs), Av.7 °F (37.6 °C), Min:97.1 °F (36.2 °C), Max:102.9 °F (39.4 °C)    Visit Vitals    /61    Pulse 70    Temp 97.1 °F (36.2 °C)    Resp 16    Wt 113.4 kg (250 lb)    SpO2 100%    BMI 39.16 kg/m2       ROS: patient unable to communicate fluently. Detailed ros not feasible. Physical Exam:    General: Well developed, well nourished female laying on the bed AAOx3 NAD    General:   awake alert and oriented   HEENT:  Normocephalic, atraumatic, PERRL, EOMI, no scleral icterus or pallor; no conjunctival hemmohage;  nasal and oral mucous are moist and without evidence of lesions. Neck supple, no bruits. Lymph Nodes:   no cervical, axillary or inguinal adenopathy   Lungs:   non-labored, bilaterally clear to auscultation- no crackles wheezes rales or rhonchi   Heart:   s1 and s2 irregular; no rubs or gallops, no edema, + pedal pulses   Abdomen:  soft, non-distended, active bowel sounds, no hepatomegaly, no splenomegaly. Tenderness over the left abdominal pacemaker, no overlying erythema or fluctuance   Genitourinary:  deferred   Extremities:   no clubbing, cyanosis; no joint effusions or swelling; muscle mass appropriate for age   Neurologic:  No gross focal sensory abnormalities; 5/5 muscle strength to upper extremities. 0/5 strength in lower extremities.   Cranial nerves intact                        Skin:  Surgical scars abdomen, left neck well healed   Back:  minimal lumbar spine tenderness around L5, no paraspinal muscle guarding or rigidity, no CVA tenderness     Psychiatric:  No suicidal or homicidal ideations, appropriate mood and affect         Labs: Results:   Chemistry Recent Labs      04/21/17   0452  04/20/17   0642   GLU  219*  188*   NA  136  132*   K  4.0  4.5   CL  102  95*   CO2  27  28   BUN  30*  23*   CREA  1.96*  1.90*   CA  8.2*  8.7   AGAP  7  9   BUCR  15  12   AP  144*   --    TP  6.4   --    ALB  2.5*   --    GLOB  3.9   --    AGRAT  0.6*   --       CBC w/Diff Recent Labs      04/21/17   0452  04/20/17   0923  04/20/17   0642   WBC  23.4*  21.2*   --    RBC  2.95*  3.40*   --    HGB  7.6*  9.0*  9.0*   HCT  22.7*  26.2*  26.4*   PLT  141  209   --    GRANS  75  88*   --    LYMPH  5*  3*   --    EOS  0  0   --       Microbiology Recent Labs      04/20/17   1808  04/20/17   0933  04/20/17   0923   CULT  PENDING  CULTURE IN PROGRESS,FURTHER UPDATES TO FOLLOW  CULTURE IN PROGRESS,FURTHER UPDATES TO FOLLOW          RADIOLOGY:    All available imaging studies/reports in [facility] for this admission were reviewed    Dr. Kenrick Maldonado, Infectious Disease Specialist  [phone]  April 21, 2017  3:49 PM

## 2017-04-21 NOTE — PROGRESS NOTES
Bedside shift change report given to [address] [place] [address] Dell (oncoming nurse) by DANIEL Valle(offgoing nurse). Report included the following information:SBAR,MAR,KArdex and recent results and summary results. Patient is stable on Levophed at 5mcg/min,patient is verbal and awake, denies any pain,Call bell within reach.

## 2017-04-21 NOTE — PROGRESS NOTES
128/61 70   Wbc 23  hct 22  inr 1.5  Bun/cr 30/1.96  Drain with 350 cc pus out from psoas since insertion- nothing activ(most on insertion)    PE  Awake alert   Appears well no longer septic, at baseline. No back pain  Co soreness to pacemaker  Legs decreased sensation-, but sensation bilaterally. No motor- para plegic  Areflexic    AP    Difficult to discern events yesterday and best course. Clearly developed infected psoas hematoma. This progressed to sepsis over course of morning. Neurologic picture rapidly deteriorated from when I saw her in AM to when she lost movement in left leg. She may have suffered a vascular injury to cord secondary to sepsis, coagulopathy, hypotension bringing about sudden painless weakness with watershed cord injury. In this case observation and support is the best treatment    At no time has she had significant back pain which one would have thought would have accompanied infection if infection progressed directly to spine and caused epidural abscess and neurologic issue. CT scan of spine shows minimal changes to spine itself. The only thing Icould hypothesize is the psoas abscess suddenly decompressing itself into spinal canal via foramina under pressure- but this is exactly the reverse of what is usually seen and I believe would have shown up on the CT scan. At this time the patient has resolved her hypotension, looks dramatically clinically improved since the obvious abscess has been drained, has no back pain. To commit the patient to an exploratory surgery to see if she has a epidural abscess that I cannot define radiographically, that clinically is not clearly the case, I do not believe is in the patients best interest at this time. I have no other diagnostic tests that are prudent to define an epidural process. At this time I believe non surgical management of her spine is best as we have no defined surgical pathology to address.     I would continue ABX, search for source of seeding of her psoas hematoma,  evaluate her pacer to see if it is infected (it is painful to her), maintain maximal BP, assume a spinal cord injury and mobilize as able. An extended rehabilitation course is to be expected.

## 2017-04-21 NOTE — CONSULTS
[facility] - Consult Note    Consultation request by Dimple Gonsales MD for advice/opinion related to evaluating Acute paraplegia  Acute paraplegia [facility])    Date of  Admission: 4/20/2017  3:36 PM   Primary Care Physician:  Lety Davis DO     Assessment:     Patient Active Problem List   Diagnosis Code    Nonischemic cardiomyopathy ([facility] I42.8    History of complete heart block Z86.79    Biventricular implantable cardioverter-defibrillator in situ Z95.810    Left bundle branch block (LBBB) on electrocardiogram I44.7    Type 2 diabetes mellitus with diabetic neuropathy ([facility] E11.40    Dyslipidemia E78.5    Diabetic neuropathy associated with type 2 diabetes mellitus ([facility] E11.40    Obstructive sleep apnea on CPAP G47.33, Z99.89    AICD generator infection ([facility] T82. 7XXA    Difficult airway for intubation T88. 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 1XXA    Impaired mobility and ADLs Z74.09    History of Coumadin therapy Z79.01    Gout M10.9    Acute paraplegia (HCC) G82.20    Sepsis (HCC) A41.9       -Sepsis secondary to infected right psoas hematoma/epidural abscess with neurological compromise resulting in paraplegia. Now s/p drain by IR.  -Heart block s/p AICD 2005 with upgrade to BiV later that year, removed however in 2012 due to trauma and infection. New biventricular pacemaker LUQ 9/2012 by Dr. Navarro Lam and revision 10/2012 due to Twiddler's syndrome. Pacemaker dependent. Normal function 3/2017. Patient does report discomfort left upper quadrant following fall last month. -Elevated troponin likely demand ischemia in setting of sepsis. -H/p transient nonischemic CMY with EF 45-50% 6/2015 with patent coronaries on cath 1996. No ischemia on nuclear 6/2015.  -Acute kidney injury.  -Diabetes mellitus.  -Dyslipidemia. -H/o HTN. -LBBB. -HALI on CPAP. -H/o DVT. Primary Cardiologist Dr. Gabe Green. Plan:       Continue supportive care with pressor support as needed. Continue abx as outlined by ID team. If PPM developing infection this will be difficult situation as patient is PPM dependent. Will ask [employer] to interrogate device. Will get echocardiogram for LV function. Will continue serial biomarkers. ASA if OK from anemia standpoint. Continue statin. BB when BP improves. No ace or arb given LISBETH. No need to diuretics at this time. History of Present Illness: This is a 77 y.o. female admitted for Acute paraplegia  Acute paraplegia ([facility] Patient complains of:  Weakness. Patient was admitted to [address] [place] [address] last month with psoas hematoma which was conservatively managed. Patient was transferred to rehab. She was discharged on Wednesday. When getting in the care she noted LE weakness suddenly. She was brought back to hospital and now admitted with sepsis from likely psoas abscess which was drained by IR. Cardiology is asked to comment on elevated troponin. Patient denies any CP. She does get SOB with activity which she noted with rehab. She has chronic orthopnea. She has not had significant MARY. Of concern patient had BiV AICD previously explanted due to trauma and infection 2012. She did have attempted placement of her left internal jugular temporary pacemaker which was unsuccessful due to complete obstruction of both her right subclavian and internal jugular veins.  She was device-dependent, so she underwent epicardial lead and abdominal pacemaker implantation by Dr. Ocampo Nip on 08/22/2012. Her AICD component was not implanted as the patient was high risk of infection. Patient now has discomfort left abdominal wall after fall last month. Cardiac risk factors:   Transient NICMY  HTN  DM  PPM    Echo 6/2015  Procedure information: This was a limited study to rule out apical  thrombus. Left ventricle: Systolic function was mildly reduced by visual assessment. Ejection fraction was estimated in the range of 45 % to 50 %. There was  mild diffuse hypokinesis. There was severe hypokinesis of the apical  wall(s). No mass or thrombus was clearly identified, although even with  contrast this area was not optimally imaged. COMPARISONS:  There has been no significant change with the apex better visualized on  this study done with contrast. Comparison was made with the previous study  of 22-Jun-2015. Review of Symptoms:  Constitutional: positive for chills  Eyes: negative for visual disturbance  Ears, nose, mouth, throat, and face: positive for nasal congestion  Respiratory: negative for cough  Cardiovascular: positive for dyspnea, negative for chest pain  Gastrointestinal: negative for vomiting  Genitourinary:negative for dysuria  Hematologic/lymphatic: negative for bleeding  Musculoskeletal:negative for muscle weakness  Neurological: negative for dizziness     Past Medical History:     Past Medical History:   Diagnosis Date    Benign hypertensive heart disease with systolic CHF, NYHA class 2 ([facility] 9/5/2012    Biventricular implantable cardioverter-defibrillator in situ 04/28/2005    Upgraded to BiV AICD; gen change 4/2008; pocket revision 10/2009; Abdominal - done on 8/22/2012 by Dr. Ashwin Lloyd Cardiac cath 08/15/1996    Patent coronaries. Elev LVEDP. EF 50-55%.  Cardiac echocardiogram 06/23/2015    Ltd study. EF 45-50%. Mild, diffuse hypk. Severe apical hypk.   No mass or thrombus was clearly identified, although imaging was suboptimal.      Cardiac nuclear imaging test 06/19/2015    Fixed distal apical, distal septal defect more likely due to RV pacing than prior infarct. No ischemia. EF 46%. RWMA c/w RV pacing. Nondiagnostic EKG on pharm stress test.      Cardiovascular lower extremity venous duplex 09/04/2012    Acute, non-occlusive DVT in CFV on right. No DVT on left. No superficial thrombosis bilaterally.  Cardiovascular upper extremity venous duplex 08/27/2012    DVT in axillary vein on left. Left subclavian was not visualized.     Chronic anemia 9/5/2012    Chronic systolic heart failure (HCC)     Decreased calculated glomerular filtration rate (GFR) 3/30/2017    Calculated GFR equivalent to that of CKD stage 3 = 30-59 ml/min    Diabetic neuropathy associated with type 2 diabetes mellitus ([address] 6/28/2011    Difficult airway for intubation 08/22/2012    see anesthesia airway note    Dyslipidemia 6/28/2011    Gout     History of complete heart block 6/28/2011    History of Coumadin therapy     Anticoagulation for DVT of the LUE; Discontinued on 3/30/2017    History of deep venous thrombosis 9/5/2012    Left upper extremity    History of pyelonephritis 3/30/2017    Left bundle branch block (LBBB) on electrocardiogram 6/28/2011    Nonischemic cardiomyopathy ([address] 6/28/2011    Obesity (BMI 35.0-39.9 without comorbidity) ([address] 3/13/2017    Obstructive sleep apnea on CPAP 2/7/2012    Psoas hematoma, right, secondary to anticoagulant therapy 3/30/2017    Type 2 diabetes mellitus with diabetic neuropathy ([address] 6/28/2011         Social History:     Social History     Social History    Marital status:      Spouse name: N/A    Number of children: N/A    Years of education: N/A     Social History Main Topics    Smoking status: Never Smoker    Smokeless tobacco: Never Used    Alcohol use No    Drug use: No    Sexual activity: Yes     Partners: Male     Other Topics Concern    Not on file Social History Narrative        Family History:     Family History   Problem Relation Age of Onset    Cancer Father      Leukemia        Medications:      Allergies   Allergen Reactions    Vancomycin Itching    Ampicillin Itching    Bactrim [Sulfamethoxazole-Trimethoprim] Unknown (comments)    Blueberry Swelling     Causes throat swelling    Ciprofloxacin Itching    Codeine Other (comments)     Jumpy feeling    Crestor [Rosuvastatin] Itching    Darvocet A500 [Propoxyphene N-Acetaminophen] Itching    Demerol [Meperidine] Itching    Levaquin [Levofloxacin] Itching    Lipitor [Atorvastatin] Myalgia    Magnesium Oxide Itching     nausea    Minocin [Minocycline] Unknown (comments)    Pcn [Penicillins] Itching    Pravachol [Pravastatin] Swelling     Swelling in mouth     Sulfa (Sulfonamide Antibiotics) Itching    Ultracet [Tramadol-Acetaminophen] Itching    Vicodin [Hydrocodone-Acetaminophen] Unknown (comments)    Vytorin 10-10 [Ezetimibe-Simvastatin] Myalgia    Percodan [Oxycodone Hcl-Oxycodone-Asa] Itching        Current Facility-Administered Medications   Medication Dose Route Frequency    cholecalciferol (VITAMIN D3) tablet 2,000 Units  2,000 Units Oral DAILY    docusate sodium (COLACE) capsule 100 mg  100 mg Oral DAILY AFTER BREAKFAST    gabapentin (NEURONTIN) capsule 400 mg  400 mg Oral BID    oxyCODONE-acetaminophen (PERCOCET 7.5) 7.5-325 mg per tablet 1 Tab  1 Tab Oral Q4H PRN    pravastatin (PRAVACHOL) tablet 40 mg  40 mg Oral QHS    senna-docusate (PERICOLACE) 8.6-50 mg per tablet 2 Tab  2 Tab Oral PCD    acetaminophen (TYLENOL) tablet 500 mg  500 mg Oral Q6H PRN    allopurinol (ZYLOPRIM) tablet 100 mg  100 mg Oral DAILY    clotrimazole (MYCELEX) frances 10 mg  10 mg Oral 5XD    dextrose (D50W) injection syrg 12.5-25 g  25-50 mL IntraVENous PRN    folic acid (FOLVITE) tablet 1 mg  1 mg Oral DAILY    ondansetron (ZOFRAN) injection 4 mg  4 mg IntraVENous Q8H PRN    dilTIAZem (CARDIZEM) 100 mg in 0.9% sodium chloride (MBP/ADV) 100 mL infusion  15 mg/hr IntraVENous TITRATE    diphenhydrAMINE (BENADRYL) injection 25 mg  25 mg IntraVENous Q4H PRN    cefepime (MAXIPIME) 2 g in 0.9% sodium chloride (MBP/ADV) 100 mL MBP  2 g IntraVENous Q12H    VANCOMYCIN INFORMATION NOTE   Other CONTINUOUS    dexamethasone (DECADRON) 4 mg/mL injection 4 mg  4 mg IntraVENous Q6H    insulin lispro (HUMALOG) injection   SubCUTAneous Q6H    glucose chewable tablet 16 g  16 g Oral PRN    glucagon (GLUCAGEN) injection 1 mg  1 mg IntraMUSCular PRN    dextrose (D50W) injection syrg 12.5-25 g  25-50 mL IntraVENous PRN    metroNIDAZOLE (FLAGYL) IVPB premix 500 mg  500 mg IntraVENous Q8H    NOREPINephrine (LEVOPHED) 8,000 mcg in dextrose 5% 250 mL infusion  2-16 mcg/min IntraVENous TITRATE    pantoprazole (PROTONIX) 40 mg in sodium chloride 0.9 % 10 mL injection  40 mg IntraVENous DAILY    insulin glargine (LANTUS) injection 14 Units  14 Units SubCUTAneous QHS    vasopressin (VASOSTRICT) 100 Units in 0.9% sodium chloride 100 mL infusion  0.03 Units/min IntraVENous TITRATE         Physical Exam:     Visit Vitals    /61    Pulse 70    Temp 97.1 °F (36.2 °C)    Resp 16    Wt 113.4 kg (250 lb)    SpO2 100%    BMI 39.16 kg/m2     BP Readings from Last 3 Encounters:   04/21/17 128/61   04/20/17 121/66   04/06/17 92/61     Pulse Readings from Last 3 Encounters:   04/21/17 70   04/20/17 94   04/06/17 63     Wt Readings from Last 3 Encounters:   04/20/17 113.4 kg (250 lb)   04/06/17 105.7 kg (233 lb)   04/06/17 103 kg (227 lb 1.6 oz)       General:  alert, cooperative, no distress, appears stated age  Neck:  no JVD  Lungs:  clear to auscultation bilaterally  Heart:  regular rate and rhythm, S1, S2 normal, no murmur, click, rub or gallop  Abdomen:  abdomen is soft mildly tender left upper quadrant  Extremities:  extremities normal, atraumatic, no cyanosis or edema  Skin: Warm and dry.  no hyperpigmentation, vitiligo, or suspicious lesions  Neuro: alert, oriented x3, affect appropriate  Psych: non focal     Data Review:     Recent Labs      04/21/17   0452  04/20/17   0923  04/20/17   0642   WBC  23.4*  21.2*   --    HGB  7.6*  9.0*  9.0*   HCT  22.7*  26.2*  26.4*   PLT  141  209   --      Recent Labs      04/21/17   0452  04/20/17   0642   NA  136  132*   K  4.0  4.5   CL  102  95*   CO2  27  28   GLU  219*  188*   BUN  30*  23*   CREA  1.96*  1.90*   CA  8.2*  8.7   MG  1.8   --    ALB  2.5*   --    SGOT  54*   --    ALT  29   --    INR  1.5*  1.4*       Results for orders placed or performed during the hospital encounter of 04/06/17   EKG, 12 LEAD, INITIAL   Result Value Ref Range    Ventricular Rate 114 BPM    Atrial Rate 114 BPM    P-R Interval 114 ms    QRS Duration 174 ms    Q-T Interval 380 ms    QTC Calculation (Bezet) 523 ms    Calculated P Axis 41 degrees    Calculated R Axis 125 degrees    Calculated T Axis 119 degrees    Diagnosis       Electronic ventricular pacemaker  When compared with ECG of 30-MAR-2017 11:29,  Vent. rate has increased BY  38 BPM  Confirmed by Noah Moody MD, Jim Domingo ([id number]) on 4/20/2017 5:14:49 PM     Results for orders placed or performed in visit on 11/01/16   AMB POC EKG ROUTINE W/ 12 LEADS, INTER & REP    Narrative    AV sequentially paced rhythm with a rate of 67. Results for orders placed or performed in visit on 08/07/14   PACEMAKER CHECK    Impression    Bi-V paced - 100%; A-sensed - 74%; Lead impedances and threshold  WNL;  No events       All Cardiac Markers in the last 24 hours:    Lab Results   Component Value Date/Time    TROIQ 1.50 () 04/21/2017 04:52 AM    TROIQ 2.09 () 04/20/2017 11:50 PM    TROIQ 1.67 ([facility]) 04/20/2017 07:26 PM       Last Lipid:    Lab Results   Component Value Date/Time    Cholesterol, total 154 07/24/2012 01:00 AM    HDL Cholesterol 48 07/24/2012 01:00 AM    LDL, calculated 90.4 07/24/2012 01:00 AM    Triglyceride 78 07/24/2012 01:00 AM    CHOL/HDL Ratio 3.2 07/24/2012 01:00 AM       Signed By: LUDMILA Hannah     April 21, 2017

## 2017-04-22 LAB
ANION GAP BLD CALC-SCNC: 8 MMOL/L (ref 3–18)
BACTERIA SPEC CULT: ABNORMAL
BUN SERPL-MCNC: 40 MG/DL (ref 7–18)
BUN/CREAT SERPL: 25 (ref 12–20)
CALCIUM SERPL-MCNC: 8.2 MG/DL (ref 8.5–10.1)
CHLORIDE SERPL-SCNC: 101 MMOL/L (ref 100–108)
CO2 SERPL-SCNC: 26 MMOL/L (ref 21–32)
CREAT SERPL-MCNC: 1.57 MG/DL (ref 0.6–1.3)
GLUCOSE BLD STRIP.AUTO-MCNC: 269 MG/DL (ref 70–110)
GLUCOSE BLD STRIP.AUTO-MCNC: 273 MG/DL (ref 70–110)
GLUCOSE BLD STRIP.AUTO-MCNC: 274 MG/DL (ref 70–110)
GLUCOSE BLD STRIP.AUTO-MCNC: 353 MG/DL (ref 70–110)
GLUCOSE SERPL-MCNC: 225 MG/DL (ref 74–99)
GRAM STN SPEC: ABNORMAL
POTASSIUM SERPL-SCNC: 4 MMOL/L (ref 3.5–5.5)
SERVICE CMNT-IMP: ABNORMAL
SODIUM SERPL-SCNC: 135 MMOL/L (ref 136–145)
TROPONIN I SERPL-MCNC: 0.71 NG/ML (ref 0–0.04)
TROPONIN I SERPL-MCNC: 0.82 NG/ML (ref 0–0.04)
VANCOMYCIN SERPL-MCNC: 10.4 UG/ML (ref 5–40)

## 2017-04-22 PROCEDURE — 74011636637 HC RX REV CODE- 636/637: Performed by: HOSPITALIST

## 2017-04-22 PROCEDURE — 80048 BASIC METABOLIC PNL TOTAL CA: CPT | Performed by: INTERNAL MEDICINE

## 2017-04-22 PROCEDURE — 74011250636 HC RX REV CODE- 250/636: Performed by: INTERNAL MEDICINE

## 2017-04-22 PROCEDURE — 74011250637 HC RX REV CODE- 250/637: Performed by: INTERNAL MEDICINE

## 2017-04-22 PROCEDURE — 74011250637 HC RX REV CODE- 250/637: Performed by: HOSPITALIST

## 2017-04-22 PROCEDURE — 74011636637 HC RX REV CODE- 636/637: Performed by: INTERNAL MEDICINE

## 2017-04-22 PROCEDURE — 80202 ASSAY OF VANCOMYCIN: CPT | Performed by: INTERNAL MEDICINE

## 2017-04-22 PROCEDURE — C9113 INJ PANTOPRAZOLE SODIUM, VIA: HCPCS | Performed by: INTERNAL MEDICINE

## 2017-04-22 PROCEDURE — 65660000000 HC RM CCU STEPDOWN

## 2017-04-22 PROCEDURE — 36591 DRAW BLOOD OFF VENOUS DEVICE: CPT

## 2017-04-22 PROCEDURE — 82962 GLUCOSE BLOOD TEST: CPT

## 2017-04-22 PROCEDURE — 74011250636 HC RX REV CODE- 250/636: Performed by: HOSPITALIST

## 2017-04-22 PROCEDURE — 77010033678 HC OXYGEN DAILY

## 2017-04-22 PROCEDURE — 94760 N-INVAS EAR/PLS OXIMETRY 1: CPT

## 2017-04-22 PROCEDURE — 74011000250 HC RX REV CODE- 250: Performed by: INTERNAL MEDICINE

## 2017-04-22 RX ORDER — DIPHENHYDRAMINE HYDROCHLORIDE 50 MG/ML
25 INJECTION, SOLUTION INTRAMUSCULAR; INTRAVENOUS ONCE
Status: COMPLETED | OUTPATIENT
Start: 2017-04-22 | End: 2017-04-22

## 2017-04-22 RX ORDER — GENTAMICIN SULFATE 100 MG/100ML
100 INJECTION, SOLUTION INTRAVENOUS
Status: DISCONTINUED | OUTPATIENT
Start: 2017-04-22 | End: 2017-04-23

## 2017-04-22 RX ORDER — POLYETHYLENE GLYCOL 3350 17 G/17G
17 POWDER, FOR SOLUTION ORAL 3 TIMES DAILY
Status: DISCONTINUED | OUTPATIENT
Start: 2017-04-22 | End: 2017-05-25 | Stop reason: HOSPADM

## 2017-04-22 RX ORDER — DIPHENHYDRAMINE HCL 50 MG
50 CAPSULE ORAL
Status: DISCONTINUED | OUTPATIENT
Start: 2017-04-22 | End: 2017-05-25 | Stop reason: HOSPADM

## 2017-04-22 RX ORDER — INSULIN GLARGINE 100 [IU]/ML
20 INJECTION, SOLUTION SUBCUTANEOUS
Status: DISCONTINUED | OUTPATIENT
Start: 2017-04-22 | End: 2017-05-01

## 2017-04-22 RX ADMIN — CLOTRIMAZOLE 10 MG: 10 LOZENGE ORAL at 22:25

## 2017-04-22 RX ADMIN — OXYCODONE HYDROCHLORIDE AND ACETAMINOPHEN 1 TABLET: 7.5; 325 TABLET ORAL at 00:56

## 2017-04-22 RX ADMIN — DEXAMETHASONE SODIUM PHOSPHATE 4 MG: 4 INJECTION, SOLUTION INTRA-ARTICULAR; INTRALESIONAL; INTRAMUSCULAR; INTRAVENOUS; SOFT TISSUE at 12:21

## 2017-04-22 RX ADMIN — INSULIN LISPRO 9 UNITS: 100 INJECTION, SOLUTION INTRAVENOUS; SUBCUTANEOUS at 05:50

## 2017-04-22 RX ADMIN — FOLIC ACID 1 MG: 1 TABLET ORAL at 10:16

## 2017-04-22 RX ADMIN — INSULIN LISPRO 15 UNITS: 100 INJECTION, SOLUTION INTRAVENOUS; SUBCUTANEOUS at 23:35

## 2017-04-22 RX ADMIN — DIPHENHYDRAMINE HYDROCHLORIDE 25 MG: 50 INJECTION, SOLUTION INTRAMUSCULAR; INTRAVENOUS at 10:16

## 2017-04-22 RX ADMIN — SODIUM CHLORIDE 40 MG: 9 INJECTION INTRAMUSCULAR; INTRAVENOUS; SUBCUTANEOUS at 10:15

## 2017-04-22 RX ADMIN — DIPHENHYDRAMINE HYDROCHLORIDE 25 MG: 50 INJECTION, SOLUTION INTRAMUSCULAR; INTRAVENOUS at 02:43

## 2017-04-22 RX ADMIN — ALLOPURINOL 100 MG: 100 TABLET ORAL at 10:16

## 2017-04-22 RX ADMIN — CLOTRIMAZOLE 10 MG: 10 LOZENGE ORAL at 14:00

## 2017-04-22 RX ADMIN — POLYETHYLENE GLYCOL 3350 17 G: 17 POWDER, FOR SOLUTION ORAL at 22:25

## 2017-04-22 RX ADMIN — PRAVASTATIN SODIUM 40 MG: 20 TABLET ORAL at 22:25

## 2017-04-22 RX ADMIN — DEXAMETHASONE SODIUM PHOSPHATE 4 MG: 4 INJECTION, SOLUTION INTRA-ARTICULAR; INTRALESIONAL; INTRAMUSCULAR; INTRAVENOUS; SOFT TISSUE at 17:22

## 2017-04-22 RX ADMIN — INSULIN LISPRO 9 UNITS: 100 INJECTION, SOLUTION INTRAVENOUS; SUBCUTANEOUS at 12:20

## 2017-04-22 RX ADMIN — CLOTRIMAZOLE 10 MG: 10 LOZENGE ORAL at 17:21

## 2017-04-22 RX ADMIN — POLYETHYLENE GLYCOL 3350 17 G: 17 POWDER, FOR SOLUTION ORAL at 17:20

## 2017-04-22 RX ADMIN — GENTAMICIN SULFATE 100 MG: 100 INJECTION, SOLUTION INTRAVENOUS at 17:22

## 2017-04-22 RX ADMIN — DOCUSATE SODIUM 100 MG: 100 CAPSULE, LIQUID FILLED ORAL at 10:16

## 2017-04-22 RX ADMIN — GABAPENTIN 400 MG: 400 CAPSULE ORAL at 17:21

## 2017-04-22 RX ADMIN — DEXAMETHASONE SODIUM PHOSPHATE 4 MG: 4 INJECTION, SOLUTION INTRA-ARTICULAR; INTRALESIONAL; INTRAMUSCULAR; INTRAVENOUS; SOFT TISSUE at 05:23

## 2017-04-22 RX ADMIN — GABAPENTIN 400 MG: 400 CAPSULE ORAL at 10:16

## 2017-04-22 RX ADMIN — INSULIN GLARGINE 10 UNITS: 100 INJECTION, SOLUTION SUBCUTANEOUS at 10:15

## 2017-04-22 RX ADMIN — CHOLECALCIFEROL TAB 25 MCG (1000 UNIT) 2000 UNITS: 25 TAB at 10:16

## 2017-04-22 RX ADMIN — CLOTRIMAZOLE 10 MG: 10 LOZENGE ORAL at 12:28

## 2017-04-22 RX ADMIN — VANCOMYCIN HYDROCHLORIDE 1500 MG: 10 INJECTION, POWDER, LYOPHILIZED, FOR SOLUTION INTRAVENOUS at 12:23

## 2017-04-22 RX ADMIN — METRONIDAZOLE 500 MG: 500 INJECTION, SOLUTION INTRAVENOUS at 22:24

## 2017-04-22 RX ADMIN — INSULIN GLARGINE 20 UNITS: 100 INJECTION, SOLUTION SUBCUTANEOUS at 23:35

## 2017-04-22 RX ADMIN — INSULIN LISPRO 9 UNITS: 100 INJECTION, SOLUTION INTRAVENOUS; SUBCUTANEOUS at 00:46

## 2017-04-22 RX ADMIN — DEXAMETHASONE SODIUM PHOSPHATE 4 MG: 4 INJECTION, SOLUTION INTRA-ARTICULAR; INTRALESIONAL; INTRAMUSCULAR; INTRAVENOUS; SOFT TISSUE at 00:47

## 2017-04-22 RX ADMIN — Medication 2 TABLET: at 17:22

## 2017-04-22 RX ADMIN — DEXAMETHASONE SODIUM PHOSPHATE 4 MG: 4 INJECTION, SOLUTION INTRA-ARTICULAR; INTRALESIONAL; INTRAMUSCULAR; INTRAVENOUS; SOFT TISSUE at 23:35

## 2017-04-22 RX ADMIN — CLOTRIMAZOLE 10 MG: 10 LOZENGE ORAL at 10:16

## 2017-04-22 RX ADMIN — METRONIDAZOLE 500 MG: 500 INJECTION, SOLUTION INTRAVENOUS at 04:08

## 2017-04-22 RX ADMIN — DIPHENHYDRAMINE HYDROCHLORIDE 50 MG: 25 CAPSULE ORAL at 04:06

## 2017-04-22 RX ADMIN — METRONIDAZOLE 500 MG: 500 INJECTION, SOLUTION INTRAVENOUS at 12:23

## 2017-04-22 RX ADMIN — INSULIN LISPRO 9 UNITS: 100 INJECTION, SOLUTION INTRAVENOUS; SUBCUTANEOUS at 17:22

## 2017-04-22 NOTE — ROUTINE PROCESS
Bedside and Verbal shift change report given to luis e nolasco rn (oncoming nurse) by Ting Quintana RN (offgoing nurse). Report included the following information SBAR, Kardex, MAR and Recent Results. SITUATION:    Code Status: Full Code   Reason for Admission: Acute paraplegia   Acute paraplegia [facility] day: 1   Problem List:       Hospital Problems  Date Reviewed: 4/20/2017          Codes Class Noted POA    * (Principal)Acute paraplegia ([facility] ICD-10-CM: G82.20  ICD-9-CM: 344.1  4/20/2017 Yes        Sepsis ([facility] ICD-10-CM: A41.9  ICD-9-CM: 038.9, 995.91  4/20/2017 Yes        History of Coumadin therapy ICD-10-CM: Z79.01  ICD-9-CM: V58.61  Unknown Yes    Overview Signed 4/6/2017 10:35 PM by Domingo Quinn MD     Anticoagulation for chronic atrial fibrillation; Discontinued on 3/30/2017             Decreased calculated glomerular filtration rate (GFR) ICD-10-CM: R94.4  ICD-9-CM: 794.4  3/30/2017 Yes    Overview Signed 4/6/2017  5:47 PM by Domingo Quinn MD     Calculated GFR equivalent to that of CKD stage 3 = 30-59 ml/min             Iliopsoas muscle hematoma ICD-10-CM: S70.10XA  ICD-9-CM: 924.00  3/30/2017 Yes        Psoas hematoma, right, secondary to anticoagulant therapy ICD-10-CM: S30. 1XXA  ICD-9-CM: 924.9, E934.2  3/30/2017 Yes        Impaired mobility and ADLs ICD-10-CM: Z74.09  ICD-9-CM: 799.89  3/30/2017 Yes        Benign hypertensive heart disease with systolic CHF, NYHA class 2 (HCC) (Chronic) ICD-10-CM: I11.0, I50.20  ICD-9-CM: 402.11, 428.20, 428.0  9/5/2012 Yes        Type 2 diabetes mellitus with diabetic neuropathy (HCC) (Chronic) ICD-10-CM: E11.40  ICD-9-CM: 250.60, 357.2  6/28/2011 Yes              BACKGROUND:    Past Medical History:   Past Medical History:   Diagnosis Date    Benign hypertensive heart disease with systolic CHF, NYHA class 2 ([facility] 9/5/2012    Biventricular implantable cardioverter-defibrillator in situ 04/28/2005    Upgraded to BiV AICD; gen change 4/2008; pocket revision 10/2009; Abdominal - done on 8/22/2012 by Dr. Mariano Devi Cardiac cath 08/15/1996    Patent coronaries. Elev LVEDP. EF 50-55%.  Cardiac echocardiogram 06/23/2015    Ltd study. EF 45-50%. Mild, diffuse hypk. Severe apical hypk. No mass or thrombus was clearly identified, although imaging was suboptimal.      Cardiac nuclear imaging test 06/19/2015    Fixed distal apical, distal septal defect more likely due to RV pacing than prior infarct. No ischemia. EF 46%. RWMA c/w RV pacing. Nondiagnostic EKG on pharm stress test.      Cardiovascular lower extremity venous duplex 09/04/2012    Acute, non-occlusive DVT in CFV on right. No DVT on left. No superficial thrombosis bilaterally.  Cardiovascular upper extremity venous duplex 08/27/2012    DVT in axillary vein on left. Left subclavian was not visualized.     Chronic anemia 9/5/2012    Chronic systolic heart failure (HCC)     Decreased calculated glomerular filtration rate (GFR) 3/30/2017    Calculated GFR equivalent to that of CKD stage 3 = 30-59 ml/min    Diabetic neuropathy associated with type 2 diabetes mellitus ([address] 6/28/2011    Difficult airway for intubation 08/22/2012    see anesthesia airway note    Dyslipidemia 6/28/2011    Gout     History of complete heart block 6/28/2011    History of Coumadin therapy     Anticoagulation for DVT of the LUE; Discontinued on 3/30/2017    History of deep venous thrombosis 9/5/2012    Left upper extremity    History of pyelonephritis 3/30/2017    Left bundle branch block (LBBB) on electrocardiogram 6/28/2011    Nonischemic cardiomyopathy ([address] 6/28/2011    Obesity (BMI 35.0-39.9 without comorbidity) ([address] 3/13/2017    Obstructive sleep apnea on CPAP 2/7/2012    Psoas hematoma, right, secondary to anticoagulant therapy 3/30/2017    Type 2 diabetes mellitus with diabetic neuropathy ([address] 6/28/2011         Patient taking anticoagulants yes     ASSESSMENT:    Changes in Assessment Throughout Shift: assessment same drainage more red than tan from drain     Patient has Central Line: yes Reasons if yes: access   Patient has Krueger Cath: yes Reasons if yes: immobility i&0      Last Vitals:     Vitals:    04/21/17 1700 04/21/17 1736 04/21/17 1800 04/21/17 2030   BP: (!) 138/125  135/80 128/58   Pulse: 86  89 84   Resp: 20 20 19   Temp:    98.1 °F (36.7 °C)   SpO2: 100%  100% 100%   Weight:  108.2 kg (238 lb 8.6 oz)          IV and DRAINS (will only show if present)   Drain 04/20/17 Right Other (comment)-Site Assessment: Clean, dry, & intact  Peripheral IV 04/20/17 Left Arm-Site Assessment: Clean, dry, & intact  [REMOVED] Peripheral IV 04/20/17 Right Antecubital-Site Assessment: Clean, dry, & intact  Triple Lumen Triple Lumen CVL 04/20/17 Left Other(comment)-Site Assessment: Clean, dry, & intact     WOUND (if present)   Wound Type:  none   Dressing present Dressing Present : Yes   Wound Concerns/Notes:  none     PAIN    Pain Assessment    Pain Intensity 1: 0 (04/21/17 2030)              Patient Stated Pain Goal: 0  o Interventions for Pain:  none  o Intervention effective: .  o Time of last intervention: .   o Reassessment Completed: no      Last 3 Weights:  Last 3 Recorded Weights in this Encounter    04/20/17 1635 04/21/17 1736   Weight: 113.4 kg (250 lb) 108.2 kg (238 lb 8.6 oz)     Weight change:      INTAKE/OUPUT    Current Shift:      Last three shifts: 04/20 0701 - 04/21 1900  In: 1011.4 [I.V.:511.4]  Out: 1085 [Urine:600; Drains:485]     LAB RESULTS     Recent Labs      04/21/17   0452  04/20/17   0923  04/20/17   0642   WBC  23.4*  21.2*   --    HGB  7.6*  9.0*  9.0*   HCT  22.7*  26.2*  26.4*   PLT  141  209   --         Recent Labs      04/21/17   0452  04/20/17   0642   NA  136  132*   K  4.0  4.5   GLU  219*  188*   BUN  30*  23*   CREA  1.96*  1.90*   CA  8.2*  8.7   MG  1.8   --    INR  1.5*  1.4*       RECOMMENDATIONS AND DISCHARGE PLANNING     1.  Pending tests/procedures/ Plan of Care or Other Needs: antibiotics    2. Discharge plan for patient and Needs/Barriers: home    3. Estimated Discharge Date: pending Posted on Whiteboard in Patients Room: yes      4. The patient's care plan was reviewed with the oncoming nurse. \"HEALS\" SAFETY CHECK      Fall Risk    Total Score: 3    Safety Measures: Safety Measures: Bed/Chair-Wheels locked, Bed in low position, Call light within reach, Fall prevention (comment), Family at bedside, Side rails X2    A safety check occurred in the patient's room between off going nurse and oncoming nurse listed above. The safety check included the below items  Area Items   H  High Alert Medications - Verify all high alert medication drips (heparin, PCA, etc.)   E  Equipment - Suction is set up for ALL patients (with vania)  - Red plugs utilized for all equipment (IV pumps, etc.)  - WOWs wiped down at end of shift.  - Room stocked with oxygen, suction, and other unit-specific supplies   A  Alarms - Bed alarm is set for fall risk patients  - Ensure chair alarm is in place and activated if patient is up in a chair   L  Lines - Check IV for any infiltration  - Krueger bag is empty if patient has a Krueger   - Tubing and IV bags are labeled   S  Safety   - Room is clean, patient is clean, and equipment is clean. - Hallways are clear from equipment besides carts. - Fall bracelet on for fall risk patients  - Ensure room is clear and free of clutter  - Suction is set up for ALL patients (with vania)  - Hallways are clear from equipment besides carts.    - Isolation precautions followed, supplies available outside room, sign posted     Cirilo Henson RN

## 2017-04-22 NOTE — ROUTINE PROCESS
Pt c/o itching all over. No rashes or redness noted on skin. Prn benadryl given as ordered. Will continue to monitor pt.      0343 Pt's stil c/o itching all over and Dr Emma Mora made aware with order rec'd.

## 2017-04-22 NOTE — PROGRESS NOTES
[facility] Hospitalist Group  Progress Note    Patient: Benjamin Show Age: 77 y.o. : 1950 MR#: [id number] SSN: [id number]  Date/Time: 2017     Subjective:     Feels better   Some sensation in left legs   Weakness present both Lower limbs     Assessment/Plan:    77 yr old Female with PMH of iliopsoas hematoma , patient developed sudden onset of Right  leg weakness , patient evaluated by rehab MD and was transferred to medical service for further management . Dr Stacie Bobby - spine surgery consulted , neurology consulted , patient is admitted to ICU    1 Right Leg weakness   - Right Psoas hematoma , increase in size , IR drained Hematoma ( Post op diagnosis - infected hematoma- 300cc of pus aspirated  ) on Vanco , Genta and Flagyl   - significant leucocytosis     2 CMO - LBBB, - followed by cardiology     3 DM2- Continue ssi , consider increasing dose of Long acting insulin      4 H/O DVT off  AC - due to Bleed     5 Elevated trop - From stress , S/B cardi - Continue current treatments     6 Acute blood loss anemia   - Monitor H/H , BT if h/h < 7     7 UTI- POA   - Continue current antibiotics   - Follow urine culture       Case discussed with:  [x]Patient  [x]Family  [x]Nursing  []Case Management  DVT Prophylaxis:  []Lovenox  []Hep SQ  [x]SCDs  []Coumadin   []On Heparin gtt    Patient Active Problem List   Diagnosis Code    Nonischemic cardiomyopathy ([facility] I42.8    History of complete heart block Z86.79    Biventricular implantable cardioverter-defibrillator in situ Z95.810    Left bundle branch block (LBBB) on electrocardiogram I44.7    Type 2 diabetes mellitus with diabetic neuropathy ([facility] E11.40    Dyslipidemia E78.5    Diabetic neuropathy associated with type 2 diabetes mellitus ([address] E11.40    Obstructive sleep apnea on CPAP G47.33, Z99.89    AICD generator infection ([facility] T82. 7XXA    Difficult airway for intubation T88. 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 1XXA    Impaired mobility and ADLs Z74.09    History of Coumadin therapy Z79.01    Gout M10.9    Acute paraplegia ([facility]) G82.20    Sepsis ([facility]) A41.9       Objective:   VS:   Visit Vitals    /74    Pulse 77    Temp 97.7 °F (36.5 °C)    Resp 15    Wt 109.4 kg (241 lb 2.9 oz)    SpO2 99%    BMI 37.77 kg/m2      Tmax/24hrs: Temp (24hrs), Av.1 °F (36.7 °C), Min:97.7 °F (36.5 °C), Max:98.6 °F (37 °C)  IOBRIEF    Intake/Output Summary (Last 24 hours) at 17 1438  Last data filed at 17 0550   Gross per 24 hour   Intake              820 ml   Output              735 ml   Net               85 ml       General:  Ill appearing   HEENT:  NC, Atraumatic. PERRLA, anicteric sclerae. Pulmonary:  CTA Bilaterally. No Wheezing/Rhonchi/Rales. Cardiovascular: Regular rate and Rhythm. GI:  Soft, Non distended, Non tender. + Bowel sounds. Extremities:  No edema, cyanosis, clubbing. No calf tenderness. Psych:  Not anxious or agitated. Neurologic: Grossly - Motor and Sensory functions are intact .  No Anxiety , calm , no Agitation         Medications:   Current Facility-Administered Medications   Medication Dose Route Frequency    diphenhydrAMINE (BENADRYL) capsule 50 mg  50 mg Oral Q4H PRN    [START ON 2017] Vancomycin random level due 17 at 1000  1 Each Other ONCE    Gentamicin in Saline (Iso-osm) (GARAMYCIN) 100 mg/100 mL IVPB premix 100 mg  100 mg IntraVENous Q18H    [START ON 4/23/2017] Gentamicin trough level due 4/23/17 at 0730  1 Each Other ONCE    [START ON 4/23/2017] Gentamicin peak level due 4/23/17 at 0900  1 Each Other ONCE    polyethylene glycol (MIRALAX) packet 17 g  17 g Oral TID    insulin glargine (LANTUS) injection 20 Units  20 Units SubCUTAneous QHS    insulin glargine (LANTUS) injection 10 Units  10 Units SubCUTAneous DAILY    cholecalciferol (VITAMIN D3) tablet 2,000 Units  2,000 Units Oral DAILY    docusate sodium (COLACE) capsule 100 mg  100 mg Oral DAILY AFTER BREAKFAST    gabapentin (NEURONTIN) capsule 400 mg  400 mg Oral BID    oxyCODONE-acetaminophen (PERCOCET 7.5) 7.5-325 mg per tablet 1 Tab  1 Tab Oral Q4H PRN    pravastatin (PRAVACHOL) tablet 40 mg  40 mg Oral QHS    senna-docusate (PERICOLACE) 8.6-50 mg per tablet 2 Tab  2 Tab Oral PCD    acetaminophen (TYLENOL) tablet 500 mg  500 mg Oral Q6H PRN    allopurinol (ZYLOPRIM) tablet 100 mg  100 mg Oral DAILY    clotrimazole (MYCELEX) frances 10 mg  10 mg Oral 5XD    folic acid (FOLVITE) tablet 1 mg  1 mg Oral DAILY    ondansetron (ZOFRAN) injection 4 mg  4 mg IntraVENous Q8H PRN    VANCOMYCIN INFORMATION NOTE   Other CONTINUOUS    dexamethasone (DECADRON) 4 mg/mL injection 4 mg  4 mg IntraVENous Q6H    insulin lispro (HUMALOG) injection   SubCUTAneous Q6H    glucose chewable tablet 16 g  16 g Oral PRN    glucagon (GLUCAGEN) injection 1 mg  1 mg IntraMUSCular PRN    dextrose (D50W) injection syrg 12.5-25 g  25-50 mL IntraVENous PRN    metroNIDAZOLE (FLAGYL) IVPB premix 500 mg  500 mg IntraVENous Q8H    pantoprazole (PROTONIX) 40 mg in sodium chloride 0.9 % 10 mL injection  40 mg IntraVENous DAILY       Labs:    Recent Results (from the past 24 hour(s))   GLUCOSE, POC    Collection Time: 04/21/17  5:31 PM   Result Value Ref Range    Glucose (POC) 261 (H) 70 - 110 mg/dL   GLUCOSE, POC    Collection Time: 04/21/17 11:28 PM   Result Value Ref Range    Glucose (POC) 279 (H) 70 - 110 mg/dL VANCOMYCIN, RANDOM    Collection Time: 04/22/17  3:30 AM   Result Value Ref Range    VANCOMYCIN,RANDOM 10.4 5.0 - 40.0 UG/ML   TROPONIN I    Collection Time: 04/22/17  3:30 AM   Result Value Ref Range    Troponin-I, Qt. 0.71 (H) 0.0 - 8.710 NG/ML   METABOLIC PANEL, BASIC    Collection Time: 04/22/17  3:30 AM   Result Value Ref Range    Sodium 135 (L) 136 - 145 mmol/L    Potassium 4.0 3.5 - 5.5 mmol/L    Chloride 101 100 - 108 mmol/L    CO2 26 21 - 32 mmol/L    Anion gap 8 3.0 - 18 mmol/L    Glucose 225 (H) 74 - 99 mg/dL    BUN 40 (H) 7.0 - 18 MG/DL    Creatinine 1.57 (H) 0.6 - 1.3 MG/DL    BUN/Creatinine ratio 25 (H) 12 - 20      GFR est AA 40 (L) >60 ml/min/1.73m2    GFR est non-AA 33 (L) >60 ml/min/1.73m2    Calcium 8.2 (L) 8.5 - 10.1 MG/DL   GLUCOSE, POC    Collection Time: 04/22/17  5:21 AM   Result Value Ref Range    Glucose (POC) 269 (H) 70 - 110 mg/dL   GLUCOSE, POC    Collection Time: 04/22/17 11:51 AM   Result Value Ref Range    Glucose (POC) 273 (H) 70 - 110 mg/dL       Signed By: Dara Garcia MD     April 22, 2017

## 2017-04-22 NOTE — PROGRESS NOTES
Physical Therapy consult received and chart reviewed. Patient is on bedrest complete activity level. Please upgrade when patient is able to participate. Thank you,  Mckenzie Morataya.  Tasia Frye

## 2017-04-22 NOTE — PROGRESS NOTES
[facility] - Progress Note  Admit Date: 4/20/2017    Assessment:     -Sepsis secondary to infected right psoas hematoma/epidural abscess with neurological compromise resulting in paraplegia. Now s/p drain by IR.  -Heart block s/p AICD 2005 with upgrade to BiV later that year, removed however in 2012 due to trauma and infection. New biventricular pacemaker LUQ 9/2012 by Dr. Malik Davis and revision 10/2012 due to Twiddler's syndrome. Pacemaker dependent. Normal function 3/2017. Patient does report discomfort left upper quadrant following fall last month. Right axillary vein obstruction by venogram 2012.  -Pacemaker pocket pain, cannot exclude seeding from abscess. Limited options given device dependence and obstruction.    -Elevated troponin likely demand ischemia in setting of sepsis. -H/p transient nonischemic CMY with EF 45-50% 6/2015 with patent coronaries on cath 1996. No ischemia on nuclear 6/2015.  -Acute kidney injury.  -Diabetes mellitus.  -Dyslipidemia. -H/o HTN. -LBBB. -HALI on CPAP. -H/o DVT.    Primary Cardiologist Dr. Ivonne De La Cruz. Plan:     Continue to treat with antbx. Will follow. Subjective:     No new complaints.      Objective:      Patient Vitals for the past 8 hrs:   Temp Pulse Resp BP   04/22/17 0408 98 °F (36.7 °C) 90 20 129/63         Patient Vitals for the past 96 hrs:   Weight   04/22/17 0542 109.4 kg (241 lb 2.9 oz)   04/21/17 1736 108.2 kg (238 lb 8.6 oz)   04/20/17 1635 113.4 kg (250 lb)                    Intake/Output Summary (Last 24 hours) at 04/22/17 1145  Last data filed at 04/22/17 0550   Gross per 24 hour   Intake             1020 ml   Output              735 ml   Net              285 ml       Physical Exam:  General:  alert, cooperative, no distress, appears stated age  Neck:  nontender  Lungs:  clear to auscultation bilaterally  Heart:  regular rate and rhythm, S1, S2 normal, no murmur, click, rub or gallop  Abdomen:  abdomen is soft without significant tenderness, masses, organomegaly or guarding, less tenderness over pacemaker site  Extremities:  extremities normal, atraumatic, no cyanosis or edema    Data Review:     Labs: Results:       Chemistry Recent Labs      04/22/17   0330 04/21/17 0452 04/20/17   0642   GLU  225*  219*  188*   NA  135*  136  132*   K  4.0  4.0  4.5   CL  101  102  95*   CO2  26  27  28   BUN  40*  30*  23*   CREA  1.57*  1.96*  1.90*   CA  8.2*  8.2*  8.7   MG   --   1.8   --    AGAP  8  7  9   BUCR  25*  15  12   AP   --   144*   --    TP   --   6.4   --    ALB   --   2.5*   --    GLOB   --   3.9   --    AGRAT   --   0.6*   --       CBC w/Diff Recent Labs      04/21/17 0452 04/20/17 0923 04/20/17 0642   WBC  23.4*  21.2*   --    RBC  2.95*  3.40*   --    HGB  7.6*  9.0*  9.0*   HCT  22.7*  26.2*  26.4*   PLT  141  209   --    GRANS  75  88*   --    LYMPH  5*  3*   --    EOS  0  0   --       Cardiac Enzymes Lab Results   Component Value Date/Time    TROIQ 0.71 (H) 04/22/2017 03:30 AM    TROIQ 1.05 (H) 04/21/2017 12:24 PM      Coagulation Recent Labs      04/21/17 0452 04/20/17 0642   PTP  17.6*  16.3*   INR  1.5*  1.4*       Lipid Panel Lab Results   Component Value Date/Time    Cholesterol, total 154 07/24/2012 01:00 AM    HDL Cholesterol 48 07/24/2012 01:00 AM    LDL, calculated 90.4 07/24/2012 01:00 AM    VLDL, calculated 15.6 07/24/2012 01:00 AM    Triglyceride 78 07/24/2012 01:00 AM    CHOL/HDL Ratio 3.2 07/24/2012 01:00 AM      BNP Lab Results   Component Value Date/Time    B-type Natriuretic Peptide 111.9 11/19/2013 10:17 AM      Liver Enzymes Recent Labs      04/21/17 0452   TP  6.4   ALB  2.5*   AP  144*   SGOT  54*      Digoxin    Thyroid Studies Lab Results   Component Value Date/Time    TSH 0.51 04/20/2017 07:26 PM          Signed By: Hanny Dasilva MD     April 22, 2017

## 2017-04-22 NOTE — PROGRESS NOTES
TED Boyer resting at this time. Visit Vitals    /63 (BP 1 Location: Right arm, BP Patient Position: At rest)    Pulse 90    Temp 98 °F (36.7 °C)    Resp 20    Wt 241 lb 2.9 oz (109.4 kg)    SpO2 100%    BMI 37.77 kg/m2       Neuro    Right foot: slight flicker of AROM to right great toe only    Decreased sensation bilateral lower extremities      AP    1.  Continue supportive care: no spine intervention at this point    David Allan MD  4/22/2017  8:08 AM

## 2017-04-22 NOTE — ROUTINE PROCESS
Pt's comfortable and denies itching after benadryl po given. Will continue to monitor pt. Right flank tube drain flushed with sterile NS as ordered. 0800 Bedside shift change report given to [address] [place] [address] (oncoming nurse) by Hailee Mills (offgoing nurse). Report given with SBAR, Kardex, Intake/Output, MAR and Recent Results.

## 2017-04-22 NOTE — PROGRESS NOTES
Re:  Josette Gonzalez,Follow up visit     4/22/2017 1:16 PM    SSN: [id number]    Subjective:   Alexia Hassan seen with her daughter in the room. She feels better than yesterday. Medications:    Current Facility-Administered Medications   Medication Dose Route Frequency Provider Last Rate Last Dose    diphenhydrAMINE (BENADRYL) capsule 50 mg  50 mg Oral Q4H PRN Darwin Stroud MD   50 mg at 04/22/17 0406    vancomycin (VANCOCIN) 1,500 mg in 0.9% sodium chloride 500 mL IVPB  1,500 mg IntraVENous ONCE Keara Barnes  mL/hr at 04/22/17 1223 1,500 mg at 04/22/17 1223    [START ON 4/23/2017] Vancomycin random level due 4/23/17 at 1000  1 Each Other 528 Conner Reis MD        Gentamicin in Saline (Iso-osm) (GARAMYCIN) 100 mg/100 mL IVPB premix 100 mg  100 mg IntraVENous Q18H Claude Baron, MD        [START ON 4/23/2017] Gentamicin trough level due 4/23/17 at 0730  1 Each Other Robyn Stevens MD        [START ON 4/23/2017] Gentamicin peak level due 4/23/17 at 0900  1 Each Other Robyn Stevens MD        insulin glargine (LANTUS) injection 10 Units  10 Units SubCUTAneous DAILY Amy Schuster MD   10 Units at 04/22/17 1015    cholecalciferol (VITAMIN D3) tablet 2,000 Units  2,000 Units Oral DAILY Claude Baron, MD   2,000 Units at 04/22/17 1016    docusate sodium (COLACE) capsule 100 mg  100 mg Oral DAILY AFTER BREAKFAST Claude Baron, MD   100 mg at 04/22/17 1016    gabapentin (NEURONTIN) capsule 400 mg  400 mg Oral BID Claude Baron, MD   400 mg at 04/22/17 1016    oxyCODONE-acetaminophen (PERCOCET 7.5) 7.5-325 mg per tablet 1 Tab  1 Tab Oral Q4H PRN Claude Baron, MD   1 Tab at 04/22/17 0056    pravastatin (PRAVACHOL) tablet 40 mg  40 mg Oral QHS Claude Baron, MD   40 mg at 04/21/17 2207    senna-docusate (PERICOLACE) 8.6-50 mg per tablet 2 Tab  2 Tab Oral PCD Claude Baron, MD   2 Tab at 04/21/17 1740    acetaminophen (TYLENOL) tablet 500 mg  500 mg Oral Q6H PRN Mello Ridley MD   500 mg at 04/20/17 1630    allopurinol (ZYLOPRIM) tablet 100 mg  100 mg Oral DAILY Mello Ridley MD   100 mg at 04/22/17 1016    clotrimazole (MYCELEX) frances 10 mg  10 mg Oral 5XD Mello Ridley MD   10 mg at 18/88/18 6350    folic acid (FOLVITE) tablet 1 mg  1 mg Oral DAILY Mello Ridley MD   1 mg at 04/22/17 1016    ondansetron (ZOFRAN) injection 4 mg  4 mg IntraVENous Q8H PRN Mello Ridley MD        VANCOMYCIN INFORMATION NOTE   Other CONTINUOUS Bhupinder King  mL/hr at 04/20/17 1730      dexamethasone (DECADRON) 4 mg/mL injection 4 mg  4 mg IntraVENous Q6H Mello Ridley MD   4 mg at 04/22/17 1221    insulin lispro (HUMALOG) injection   SubCUTAneous Q6H Mello Ridley MD   9 Units at 04/22/17 1220    glucose chewable tablet 16 g  16 g Oral PRN Mello Ridley MD        glucagon (GLUCAGEN) injection 1 mg  1 mg IntraMUSCular PRN Mello Ridley MD        dextrose (D50W) injection syrg 12.5-25 g  25-50 mL IntraVENous PRN Mello Ridley MD        metroNIDAZOLE (FLAGYL) IVPB premix 500 mg  500 mg IntraVENous Q8H Maggie Forbes  mL/hr at 04/22/17 1223 500 mg at 04/22/17 1223    pantoprazole (PROTONIX) 40 mg in sodium chloride 0.9 % 10 mL injection  40 mg IntraVENous DAILY Maggie Forbes MD   40 mg at 04/22/17 1015    insulin glargine (LANTUS) injection 14 Units  14 Units SubCUTAneous QHS Maggie Forbes MD   14 Units at 04/21/17 2207       Vital signs:    Visit Vitals    /74    Pulse 77    Temp 97.7 °F (36.5 °C)    Resp 15    Wt 109.4 kg (241 lb 2.9 oz)    SpO2 99%    BMI 37.77 kg/m2       Review of Systems:   As above otherwise 11 point review of systems negative including;   Constitutional no fever or chills  Skin denies rash or itching  HEENT  Denies tinnitus, hearing lose  Eyes denies diplopia vision lose  Respiratory denies sortness of breath  Cardiovascular denies chest pain, dyspnea on exertion  Gastrointestinal denies nausea, vomiting, diarrhea, constipation  Genitourinary denies incontinence, perez in place. No bowel movements. Musculoskeletal denies joint pain or swelling  Endocrine denies weight change  Hematology denies easy bruising or bleeding   Neurological as above in HPI      Patient Active Problem List   Diagnosis Code    Nonischemic cardiomyopathy ([facility] I42.8    History of complete heart block Z86.79    Biventricular implantable cardioverter-defibrillator in situ Z95.810    Left bundle branch block (LBBB) on electrocardiogram I44.7    Type 2 diabetes mellitus with diabetic neuropathy ([facility]) E11.40    Dyslipidemia E78.5    Diabetic neuropathy associated with type 2 diabetes mellitus ([facility] E11.40    Obstructive sleep apnea on CPAP G47.33, Z99.89    AICD generator infection ([facility] T82. 7XXA    Difficult airway for intubation T88. 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 1XXA    Impaired mobility and ADLs Z74.09    History of Coumadin therapy Z79.01    Gout M10.9    Acute paraplegia ([facility]) G82.20    Sepsis ([facility]) A41.9         Objective: The patient is awake, alert, and oriented x 4. Fund of knowledge is adequate. Speech is fluent and memory is intact. Cranial Nerves: II  Visual fields are full to confrontation. III, IV, VI  Extraocular movements are intact. There is no nystagmus. V  Facial sensation is intact to pinprick. VII  Face is symmetrical.  VIII - Hearing is present.   IX, X, XII  Palate is symmetrical.   XI - Shoulder shrugging and head turning intact  Motor: The patient is plegic in both legs, no movement at all. Tone is flaccid in the legs. Plantars are mute. Gait is testing can't be preformed because of condition. Currently has a T9 sensory level bilaterally. CBC:   Lab Results   Component Value Date/Time    WBC 23.4 04/21/2017 04:52 AM    RBC 2.95 04/21/2017 04:52 AM    HGB 7.6 04/21/2017 04:52 AM    HCT 22.7 04/21/2017 04:52 AM    PLATELET 481 82/37/4110 04:52 AM     BMP:   Lab Results   Component Value Date/Time    Glucose 225 04/22/2017 03:30 AM    Sodium 135 04/22/2017 03:30 AM    Potassium 4.0 04/22/2017 03:30 AM    Chloride 101 04/22/2017 03:30 AM    CO2 26 04/22/2017 03:30 AM    BUN 40 04/22/2017 03:30 AM    Creatinine 1.57 04/22/2017 03:30 AM    Calcium 8.2 04/22/2017 03:30 AM     CMP:   Lab Results   Component Value Date/Time    Glucose 225 04/22/2017 03:30 AM    Sodium 135 04/22/2017 03:30 AM    Potassium 4.0 04/22/2017 03:30 AM    Chloride 101 04/22/2017 03:30 AM    CO2 26 04/22/2017 03:30 AM    BUN 40 04/22/2017 03:30 AM    Creatinine 1.57 04/22/2017 03:30 AM    Calcium 8.2 04/22/2017 03:30 AM    Anion gap 8 04/22/2017 03:30 AM    BUN/Creatinine ratio 25 04/22/2017 03:30 AM    Alk. phosphatase 144 04/21/2017 04:52 AM    Protein, total 6.4 04/21/2017 04:52 AM    Albumin 2.5 04/21/2017 04:52 AM    Globulin 3.9 04/21/2017 04:52 AM    A-G Ratio 0.6 04/21/2017 04:52 AM     Coagulation:   Lab Results   Component Value Date/Time    Prothrombin time 17.6 04/21/2017 04:52 AM    INR 1.5 04/21/2017 04:52 AM    aPTT 47.9 03/30/2017 05:35 PM     Cardiac markers:   Lab Results   Component Value Date/Time     04/20/2017 09:33 AM    CK-MB Index 0.3 04/20/2017 09:33 AM       Assessment:  Paraplegia in this patient with a right iliopsoas abscess, post drainage of 300 ml of purulent fluid. Also seems to be frankly septic at this time.    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 I did share this with the patient and her daughter as the probable etiology for her leg weakness. Plan:  Not much we can do to resolve her neurologic problem. Needs antibiotics. Will follow. Sincerely,        Evie Suh.  Brian Arnold M.D.

## 2017-04-22 NOTE — PROGRESS NOTES
[facility]  ICU Progress Note      Name: Nu Bueno   : 1950   MRN: [id number]   Date: 2017 8:04 AM     [x]I have reviewed the flowsheet and previous days notes. Events overnight reviewed and discussed with nursing staff. Vital signs and records reviewed. Subjective:   Patient unable to move her legs. No BM as of yet. She denies pain, shortness of breath, no back pain. No nausea or vomiting          ROS:Pertinent items are noted in HPI. Medication Review:  · Pressors - none  · Sedation - none  · Antibiotics -  metronidazole, vancomycin, gent  · Pain - none  · GI/ DVT - protonix, SCD    Safety Bundles: CAUTI/ Severe Sepsis Protocol/ Electrolyte Replacement Protocol    Vital Signs:    Visit Vitals    /74    Pulse 77    Temp 97.7 °F (36.5 °C)    Resp 15    Wt 109.4 kg (241 lb 2.9 oz)    SpO2 99%    BMI 37.77 kg/m2       O2 Device: Nasal cannula   O2 Flow Rate (L/min): 2 l/min   Temp (24hrs), Av.1 °F (36.7 °C), Min:97.7 °F (36.5 °C), Max:98.6 °F (37 °C)       Intake/Output:   Last shift:         Last 3 shifts:  1901 -  0700  In: 1331.4 [P.O.:120; I.V.:711.4]  Out: 1695 [Urine:1150; Drains:545]    Intake/Output Summary (Last 24 hours) at 17 1347  Last data filed at 17 0550   Gross per 24 hour   Intake              920 ml   Output              735 ml   Net              185 ml       Physical Exam:    General: Alert and oriented   HEENT:  Anicteric sclerae; pink palpebral conjunctivae; mucosa moist  Resp:  Symmetrical chest expansion, no accessory muscle use; good airway entry; no rales/ wheezing/ rhonchi noted  CV:  S1, S2 present; regular rate and rhythm  GI:  Abdomen soft, non-tender; (+) active bowel sounds  Extremities:  +2 pulses on all extremities; unable to move lower extremities without or against resistance. Skin:  Warm; no rashes/ lesions noted  Neurologic:  0/5 strength in the lower extremities bilaterally.   5/5 strength at upper extremities.       DATA:     Current Facility-Administered Medications   Medication Dose Route Frequency    diphenhydrAMINE (BENADRYL) capsule 50 mg  50 mg Oral Q4H PRN    vancomycin (VANCOCIN) 1,500 mg in 0.9% sodium chloride 500 mL IVPB  1,500 mg IntraVENous ONCE    [START ON 4/23/2017] Vancomycin random level due 4/23/17 at 1000  1 Each Other ONCE    Gentamicin in Saline (Iso-osm) (GARAMYCIN) 100 mg/100 mL IVPB premix 100 mg  100 mg IntraVENous Q18H    [START ON 4/23/2017] Gentamicin trough level due 4/23/17 at 0730  1 Each Other ONCE    [START ON 4/23/2017] Gentamicin peak level due 4/23/17 at 0900  1 Each Other ONCE    insulin glargine (LANTUS) injection 10 Units  10 Units SubCUTAneous DAILY    cholecalciferol (VITAMIN D3) tablet 2,000 Units  2,000 Units Oral DAILY    docusate sodium (COLACE) capsule 100 mg  100 mg Oral DAILY AFTER BREAKFAST    gabapentin (NEURONTIN) capsule 400 mg  400 mg Oral BID    oxyCODONE-acetaminophen (PERCOCET 7.5) 7.5-325 mg per tablet 1 Tab  1 Tab Oral Q4H PRN    pravastatin (PRAVACHOL) tablet 40 mg  40 mg Oral QHS    senna-docusate (PERICOLACE) 8.6-50 mg per tablet 2 Tab  2 Tab Oral PCD    acetaminophen (TYLENOL) tablet 500 mg  500 mg Oral Q6H PRN    allopurinol (ZYLOPRIM) tablet 100 mg  100 mg Oral DAILY    clotrimazole (MYCELEX) frances 10 mg  10 mg Oral 5XD    folic acid (FOLVITE) tablet 1 mg  1 mg Oral DAILY    ondansetron (ZOFRAN) injection 4 mg  4 mg IntraVENous Q8H PRN    VANCOMYCIN INFORMATION NOTE   Other CONTINUOUS    dexamethasone (DECADRON) 4 mg/mL injection 4 mg  4 mg IntraVENous Q6H    insulin lispro (HUMALOG) injection   SubCUTAneous Q6H    glucose chewable tablet 16 g  16 g Oral PRN    glucagon (GLUCAGEN) injection 1 mg  1 mg IntraMUSCular PRN    dextrose (D50W) injection syrg 12.5-25 g  25-50 mL IntraVENous PRN    metroNIDAZOLE (FLAGYL) IVPB premix 500 mg  500 mg IntraVENous Q8H    pantoprazole (PROTONIX) 40 mg in sodium chloride 0.9 % 10 mL injection  40 mg IntraVENous DAILY    insulin glargine (LANTUS) injection 14 Units  14 Units SubCUTAneous QHS         Labs: Results:       Chemistry Recent Labs      04/22/17   0330  04/21/17   0452  04/20/17   0642   GLU  225*  219*  188*   NA  135*  136  132*   K  4.0  4.0  4.5   CL  101  102  95*   CO2  26  27  28   BUN  40*  30*  23*   CREA  1.57*  1.96*  1.90*   CA  8.2*  8.2*  8.7   AGAP  8  7  9   BUCR  25*  15  12   AP   --   144*   --    TP   --   6.4   --    ALB   --   2.5*   --    GLOB   --   3.9   --    AGRAT   --   0.6*   --       CBC w/Diff Recent Labs      04/21/17   0452  04/20/17   0923  04/20/17   0642   WBC  23.4*  21.2*   --    RBC  2.95*  3.40*   --    HGB  7.6*  9.0*  9.0*   HCT  22.7*  26.2*  26.4*   PLT  141  209   --    GRANS  75  88*   --    LYMPH  5*  3*   --    EOS  0  0   --       Coagulation Recent Labs      04/21/17   0452  04/20/17   0642   PTP  17.6*  16.3*   INR  1.5*  1.4*       Liver Enzymes Recent Labs      04/21/17   0452   TP  6.4   ALB  2.5*   AP  144*   SGOT  54*      ABG No results found for: PH, PHI, PCO2, PCO2I, PO2, PO2I, HCO3, HCO3I, FIO2, FIO2I   Microbiology Recent Labs      04/20/17   1808  04/20/17   1026  04/20/17   0933   CULT  MANY  STREPTOCOCCI, BETA HEMOLYTIC GROUP B  *  NO ANAEROBES ISOLATED 2 DAYS  >100,000  COLONIES/mL  ENTEROCOCCUS FAECALIS GROUP D  *  ANAEROBIC BOTTLE  STREPTOCOCCI, BETA HEMOLYTIC GROUP B  *          Telemetry: [x]Sinus []A-flutter [x]Paced    []A-fib []Multiple PVCs                    Imaging:  [x]I have personally reviewed the patients radiographs  []Radiographs reviewed with radiologist   []No change from prior, tubes and lines in adequate position  []Improved   []Worsening        IMPRESSION:   · Psoas abscess Grab A Beta Hemolytic strep abscess and blood cx sens pending  · Enterococcus faecalis in urine (gent susceptible)  · Sepsis (severe sepsis based on old classification)  · Bacteremia   · ? pain over pacer site (?infected pocket)  · Paraplegia- likely 2/2 inflammatory venous thrombosis of dural veins adjacent to psoas abscess, resulting in spinal cord infarct  · Recent psoas hematoma  · LISBETH  · NSTEMI   · Hx of CHF and Afib  · Prior complicated pacer placement (apparently upper extremity and central vein stenoses)  · Inability to obtain MRI (pacer)      PLAN:   · Neuro: paraplegia likely 2/2 abscess. Steroids started. Abscess drained by IR. Neurology consulted. PT consulted. Presently on dexamethasaone for swelling. gabapentin  · CV: Type 2 NSTEMI consider outpatient stress eval. Holding BP meds montior for Afib Cardiology consulted. · Resp: aspiration precautions  · GI: advance to ADA diet with aspiration precautions; PPI increasing bowel regiment today. · Renal: LISBETH graudally improved with fluids likely 2/2 intrinsic injury with sepsis. On allopurinol for hx of hyperuricemia  · ID: psoas abscess, secondary dural venous thrombosis - con't vanc, gent and flagyl per ID. · Heme: SCDs. Hold chemical prophylaxis with recent bleed and procedure. Hgb dropped today will repeat to ensure not artifactual  · Endo: Continue HS Lantus, add AM dose with hyperglycemia due to steroids. Sliding scale.  Increasing insulin dosage          The patient is: [x] acutely ill Risk of deterioration: [x] moderate    [] critically ill  [] high     [x]See my orders for details    My assessment/plan was discussed with:  [x]nursing []PT/OT    [x]respiratory therapy [x]Neurology   []family []     [x]Total critical care time exclusive of procedures   35    minutes    Mando Jimenez MD

## 2017-04-23 LAB
ANION GAP BLD CALC-SCNC: 9 MMOL/L (ref 3–18)
BACTERIA SPEC CULT: ABNORMAL
BUN SERPL-MCNC: 49 MG/DL (ref 7–18)
BUN/CREAT SERPL: 36 (ref 12–20)
CALCIUM SERPL-MCNC: 8.5 MG/DL (ref 8.5–10.1)
CHLORIDE SERPL-SCNC: 105 MMOL/L (ref 100–108)
CO2 SERPL-SCNC: 25 MMOL/L (ref 21–32)
CREAT SERPL-MCNC: 1.37 MG/DL (ref 0.6–1.3)
CREAT SERPL-MCNC: 1.44 MG/DL (ref 0.6–1.3)
DATE LAST DOSE: NORMAL
ERYTHROCYTE [DISTWIDTH] IN BLOOD BY AUTOMATED COUNT: 14.8 % (ref 11.6–14.5)
GENTAMICIN TROUGH SERPL-MCNC: 1.9 UG/ML
GLUCOSE BLD STRIP.AUTO-MCNC: 269 MG/DL (ref 70–110)
GLUCOSE BLD STRIP.AUTO-MCNC: 270 MG/DL (ref 70–110)
GLUCOSE BLD STRIP.AUTO-MCNC: 271 MG/DL (ref 70–110)
GLUCOSE BLD STRIP.AUTO-MCNC: 324 MG/DL (ref 70–110)
GLUCOSE SERPL-MCNC: 254 MG/DL (ref 74–99)
GRAM STN SPEC: ABNORMAL
HCT VFR BLD AUTO: 24.4 % (ref 35–45)
HGB BLD-MCNC: 8.4 G/DL (ref 12–16)
MCH RBC QN AUTO: 26.2 PG (ref 24–34)
MCHC RBC AUTO-ENTMCNC: 34.4 G/DL (ref 31–37)
MCV RBC AUTO: 76 FL (ref 74–97)
PLATELET # BLD AUTO: 205 K/UL (ref 135–420)
PMV BLD AUTO: 9.6 FL (ref 9.2–11.8)
POTASSIUM SERPL-SCNC: 4.5 MMOL/L (ref 3.5–5.5)
RBC # BLD AUTO: 3.21 M/UL (ref 4.2–5.3)
REPORTED DOSE,DOSE: NORMAL UNITS
REPORTED DOSE/TIME,TMG: 1400
SERVICE CMNT-IMP: ABNORMAL
SODIUM SERPL-SCNC: 139 MMOL/L (ref 136–145)
TROPONIN I SERPL-MCNC: 0.38 NG/ML (ref 0–0.04)
VANCOMYCIN SERPL-MCNC: 14.3 UG/ML (ref 5–40)
WBC # BLD AUTO: 16.6 K/UL (ref 4.6–13.2)

## 2017-04-23 PROCEDURE — 65660000000 HC RM CCU STEPDOWN

## 2017-04-23 PROCEDURE — 74011250637 HC RX REV CODE- 250/637: Performed by: HOSPITALIST

## 2017-04-23 PROCEDURE — 74011250636 HC RX REV CODE- 250/636: Performed by: INTERNAL MEDICINE

## 2017-04-23 PROCEDURE — 85027 COMPLETE CBC AUTOMATED: CPT | Performed by: INTERNAL MEDICINE

## 2017-04-23 PROCEDURE — 74011000250 HC RX REV CODE- 250: Performed by: INTERNAL MEDICINE

## 2017-04-23 PROCEDURE — 36415 COLL VENOUS BLD VENIPUNCTURE: CPT | Performed by: INTERNAL MEDICINE

## 2017-04-23 PROCEDURE — 74011250637 HC RX REV CODE- 250/637: Performed by: INTERNAL MEDICINE

## 2017-04-23 PROCEDURE — 84484 ASSAY OF TROPONIN QUANT: CPT | Performed by: INTERNAL MEDICINE

## 2017-04-23 PROCEDURE — 82962 GLUCOSE BLOOD TEST: CPT

## 2017-04-23 PROCEDURE — 80170 ASSAY OF GENTAMICIN: CPT | Performed by: HOSPITALIST

## 2017-04-23 PROCEDURE — 87040 BLOOD CULTURE FOR BACTERIA: CPT | Performed by: INTERNAL MEDICINE

## 2017-04-23 PROCEDURE — C9113 INJ PANTOPRAZOLE SODIUM, VIA: HCPCS | Performed by: INTERNAL MEDICINE

## 2017-04-23 PROCEDURE — 80202 ASSAY OF VANCOMYCIN: CPT | Performed by: INTERNAL MEDICINE

## 2017-04-23 PROCEDURE — 80048 BASIC METABOLIC PNL TOTAL CA: CPT | Performed by: INTERNAL MEDICINE

## 2017-04-23 PROCEDURE — 74011636637 HC RX REV CODE- 636/637: Performed by: INTERNAL MEDICINE

## 2017-04-23 PROCEDURE — 74011250636 HC RX REV CODE- 250/636: Performed by: HOSPITALIST

## 2017-04-23 RX ORDER — GENTAMICIN SULFATE 100 MG/100ML
100 INJECTION, SOLUTION INTRAVENOUS EVERY 24 HOURS
Status: DISCONTINUED | OUTPATIENT
Start: 2017-04-24 | End: 2017-04-24

## 2017-04-23 RX ORDER — INSULIN GLARGINE 100 [IU]/ML
20 INJECTION, SOLUTION SUBCUTANEOUS DAILY
Status: DISCONTINUED | OUTPATIENT
Start: 2017-04-24 | End: 2017-05-15

## 2017-04-23 RX ORDER — DIPHENHYDRAMINE HYDROCHLORIDE 50 MG/ML
25 INJECTION, SOLUTION INTRAMUSCULAR; INTRAVENOUS ONCE
Status: COMPLETED | OUTPATIENT
Start: 2017-04-23 | End: 2017-04-23

## 2017-04-23 RX ORDER — GENTAMICIN SULFATE 100 MG/100ML
100 INJECTION, SOLUTION INTRAVENOUS EVERY 24 HOURS
Status: DISCONTINUED | OUTPATIENT
Start: 2017-04-23 | End: 2017-04-23

## 2017-04-23 RX ADMIN — POLYETHYLENE GLYCOL 3350 17 G: 17 POWDER, FOR SOLUTION ORAL at 23:25

## 2017-04-23 RX ADMIN — FOLIC ACID 1 MG: 1 TABLET ORAL at 10:10

## 2017-04-23 RX ADMIN — Medication 2 TABLET: at 18:57

## 2017-04-23 RX ADMIN — INSULIN LISPRO 12 UNITS: 100 INJECTION, SOLUTION INTRAVENOUS; SUBCUTANEOUS at 23:33

## 2017-04-23 RX ADMIN — POLYETHYLENE GLYCOL 3350 17 G: 17 POWDER, FOR SOLUTION ORAL at 10:11

## 2017-04-23 RX ADMIN — DIPHENHYDRAMINE HYDROCHLORIDE 25 MG: 50 INJECTION, SOLUTION INTRAMUSCULAR; INTRAVENOUS at 13:53

## 2017-04-23 RX ADMIN — SODIUM CHLORIDE 40 MG: 9 INJECTION INTRAMUSCULAR; INTRAVENOUS; SUBCUTANEOUS at 10:11

## 2017-04-23 RX ADMIN — GENTAMICIN SULFATE 100 MG: 100 INJECTION, SOLUTION INTRAVENOUS at 09:40

## 2017-04-23 RX ADMIN — CLOTRIMAZOLE 10 MG: 10 LOZENGE ORAL at 18:57

## 2017-04-23 RX ADMIN — INSULIN LISPRO 9 UNITS: 100 INJECTION, SOLUTION INTRAVENOUS; SUBCUTANEOUS at 08:36

## 2017-04-23 RX ADMIN — DOCUSATE SODIUM 100 MG: 100 CAPSULE, LIQUID FILLED ORAL at 10:10

## 2017-04-23 RX ADMIN — METRONIDAZOLE 500 MG: 500 INJECTION, SOLUTION INTRAVENOUS at 23:39

## 2017-04-23 RX ADMIN — CLOTRIMAZOLE 10 MG: 10 LOZENGE ORAL at 13:48

## 2017-04-23 RX ADMIN — INSULIN LISPRO 9 UNITS: 100 INJECTION, SOLUTION INTRAVENOUS; SUBCUTANEOUS at 12:40

## 2017-04-23 RX ADMIN — CLOTRIMAZOLE 10 MG: 10 LOZENGE ORAL at 12:42

## 2017-04-23 RX ADMIN — ALLOPURINOL 100 MG: 100 TABLET ORAL at 10:10

## 2017-04-23 RX ADMIN — GABAPENTIN 400 MG: 400 CAPSULE ORAL at 10:10

## 2017-04-23 RX ADMIN — VANCOMYCIN HYDROCHLORIDE 1500 MG: 10 INJECTION, POWDER, LYOPHILIZED, FOR SOLUTION INTRAVENOUS at 14:33

## 2017-04-23 RX ADMIN — CLOTRIMAZOLE 10 MG: 10 LOZENGE ORAL at 08:36

## 2017-04-23 RX ADMIN — DEXAMETHASONE SODIUM PHOSPHATE 4 MG: 4 INJECTION, SOLUTION INTRA-ARTICULAR; INTRALESIONAL; INTRAMUSCULAR; INTRAVENOUS; SOFT TISSUE at 12:42

## 2017-04-23 RX ADMIN — PRAVASTATIN SODIUM 40 MG: 20 TABLET ORAL at 23:24

## 2017-04-23 RX ADMIN — GABAPENTIN 400 MG: 400 CAPSULE ORAL at 18:57

## 2017-04-23 RX ADMIN — CHOLECALCIFEROL TAB 25 MCG (1000 UNIT) 2000 UNITS: 25 TAB at 10:10

## 2017-04-23 RX ADMIN — INSULIN GLARGINE 10 UNITS: 100 INJECTION, SOLUTION SUBCUTANEOUS at 10:18

## 2017-04-23 RX ADMIN — METRONIDAZOLE 500 MG: 500 INJECTION, SOLUTION INTRAVENOUS at 13:47

## 2017-04-23 RX ADMIN — CLOTRIMAZOLE 10 MG: 10 LOZENGE ORAL at 23:25

## 2017-04-23 RX ADMIN — INSULIN LISPRO 9 UNITS: 100 INJECTION, SOLUTION INTRAVENOUS; SUBCUTANEOUS at 18:59

## 2017-04-23 RX ADMIN — INSULIN GLARGINE 20 UNITS: 100 INJECTION, SOLUTION SUBCUTANEOUS at 23:27

## 2017-04-23 RX ADMIN — DEXAMETHASONE SODIUM PHOSPHATE 4 MG: 4 INJECTION, SOLUTION INTRA-ARTICULAR; INTRALESIONAL; INTRAMUSCULAR; INTRAVENOUS; SOFT TISSUE at 08:35

## 2017-04-23 NOTE — ROUTINE PROCESS
Bedside shift change report given to Nurse Stephanie Rodriguez RN (oncoming nurse) by Melonie Jeans, RN  (offgoing nurse). Report included the following information SBAR, Kardex, Procedure Summary, Intake/Output, MAR and Recent Results.

## 2017-04-23 NOTE — PROGRESS NOTES
[facility]  ICU Progress Note      Name: Solo Arora   : 1950   MRN: [id number]   Date: 2017 8:04 AM     [x]I have reviewed the flowsheet and previous days notes. Events overnight reviewed and discussed with nursing staff. Vital signs and records reviewed. Subjective:  Patient persistently unable to move her legs. No BM. She denies pain, Mild dyspnea improved with positioning. No nausea or vomiting, Apetite ok. ROS:Pertinent items are noted in HPI. Medication Review:  · Pressors - none  · Sedation - none  · Antibiotics -  metronidazole, vancomycin, gent  · Pain - none  · GI/ DVT - protonix, SCD    Safety Bundles: CAUTI/ Severe Sepsis Protocol/ Electrolyte Replacement Protocol    Vital Signs:    Visit Vitals    /58    Pulse 79    Temp 98.3 °F (36.8 °C)    Resp 22    Wt 109 kg (240 lb 4.8 oz)    SpO2 99%    BMI 37.64 kg/m2       O2 Device: Nasal cannula   O2 Flow Rate (L/min): 2 l/min   Temp (24hrs), Av.8 °F (36.6 °C), Min:97.4 °F (36.3 °C), Max:98.3 °F (36.8 °C)       Intake/Output:   Last shift:         Last 3 shifts:  1901 -  0700  In: 6209 [P.O.:810; I.V.:400]  Out: 1410 [Urine:1300; Drains:110]    Intake/Output Summary (Last 24 hours) at 17 3034  Last data filed at 17 2049   Gross per 24 hour   Intake              660 ml   Output              800 ml   Net             -140 ml       Physical Exam:    General: Alert and oriented   HEENT:  Anicteric sclerae; pink palpebral conjunctivae; mucosa moist  Resp:  Symmetrical chest expansion, no accessory muscle use; good airway entry; no rales/ wheezing/ rhonchi noted  CV:  S1, S2 present; regular rate and rhythm  GI:  Abdomen soft, non-tender; (+) active bowel sounds  Extremities:  +2 pulses on all extremities; unable to move lower extremities without or against resistance. Skin:  Warm; no rashes/ lesions noted  Neurologic:  0/5 strength in the lower extremities bilaterally.   5/5 strength at upper extremities.       DATA:     Current Facility-Administered Medications   Medication Dose Route Frequency    diphenhydrAMINE (BENADRYL) capsule 50 mg  50 mg Oral Q4H PRN    Vancomycin random level due 4/23/17 at 1000  1 Each Other ONCE    Gentamicin in Saline (Iso-osm) (GARAMYCIN) 100 mg/100 mL IVPB premix 100 mg  100 mg IntraVENous Q18H    Gentamicin trough level due 4/23/17 at 0730  1 Each Other ONCE    Gentamicin peak level due 4/23/17 at 0900  1 Each Other ONCE    polyethylene glycol (MIRALAX) packet 17 g  17 g Oral TID    insulin glargine (LANTUS) injection 20 Units  20 Units SubCUTAneous QHS    insulin glargine (LANTUS) injection 10 Units  10 Units SubCUTAneous DAILY    cholecalciferol (VITAMIN D3) tablet 2,000 Units  2,000 Units Oral DAILY    docusate sodium (COLACE) capsule 100 mg  100 mg Oral DAILY AFTER BREAKFAST    gabapentin (NEURONTIN) capsule 400 mg  400 mg Oral BID    oxyCODONE-acetaminophen (PERCOCET 7.5) 7.5-325 mg per tablet 1 Tab  1 Tab Oral Q4H PRN    pravastatin (PRAVACHOL) tablet 40 mg  40 mg Oral QHS    senna-docusate (PERICOLACE) 8.6-50 mg per tablet 2 Tab  2 Tab Oral PCD    acetaminophen (TYLENOL) tablet 500 mg  500 mg Oral Q6H PRN    allopurinol (ZYLOPRIM) tablet 100 mg  100 mg Oral DAILY    clotrimazole (MYCELEX) frances 10 mg  10 mg Oral 5XD    folic acid (FOLVITE) tablet 1 mg  1 mg Oral DAILY    ondansetron (ZOFRAN) injection 4 mg  4 mg IntraVENous Q8H PRN    VANCOMYCIN INFORMATION NOTE   Other CONTINUOUS    dexamethasone (DECADRON) 4 mg/mL injection 4 mg  4 mg IntraVENous Q6H    insulin lispro (HUMALOG) injection   SubCUTAneous Q6H    glucose chewable tablet 16 g  16 g Oral PRN    glucagon (GLUCAGEN) injection 1 mg  1 mg IntraMUSCular PRN    dextrose (D50W) injection syrg 12.5-25 g  25-50 mL IntraVENous PRN    metroNIDAZOLE (FLAGYL) IVPB premix 500 mg  500 mg IntraVENous Q8H    pantoprazole (PROTONIX) 40 mg in sodium chloride 0.9 % 10 mL injection  40 mg IntraVENous DAILY         Labs: Results:       Chemistry Recent Labs      04/23/17   0832  04/23/17   0650  04/22/17   0330  04/21/17   0452   GLU  254*   --   225*  219*   NA  139   --   135*  136   K  4.5   --   4.0  4.0   CL  105   --   101  102   CO2  25   --   26  27   BUN  49*   --   40*  30*   CREA  1.37*  1.44*  1.57*  1.96*   CA  8.5   --   8.2*  8.2*   AGAP  9   --   8  7   BUCR  36*   --   25*  15   AP   --    --    --   144*   TP   --    --    --   6.4   ALB   --    --    --   2.5*   GLOB   --    --    --   3.9   AGRAT   --    --    --   0.6*      CBC w/Diff Recent Labs      04/23/17   0832  04/21/17   0452   WBC  16.6*  23.4*   RBC  3.21*  2.95*   HGB  8.4*  7.6*   HCT  24.4*  22.7*   PLT  205  141   GRANS   --   75   LYMPH   --   5*   EOS   --   0      Coagulation Recent Labs      04/21/17   0452   PTP  17.6*   INR  1.5*       Liver Enzymes Recent Labs      04/21/17   0452   TP  6.4   ALB  2.5*   AP  144*   SGOT  54*      ABG No results found for: PH, PHI, PCO2, PCO2I, PO2, PO2I, HCO3, HCO3I, FIO2, FIO2I   Microbiology Recent Labs      04/20/17   1808  04/20/17   1026   CULT  MANY  STREPTOCOCCI, BETA HEMOLYTIC GROUP B  *  NO ANAEROBES ISOLATED 3 DAYS  >100,000  COLONIES/mL  ENTEROCOCCUS FAECALIS GROUP D  *          Telemetry: [x]Sinus []A-flutter [x]Paced    []A-fib []Multiple PVCs                    Imaging:  [x]I have personally reviewed the patients radiographs  []Radiographs reviewed with radiologist   []No change from prior, tubes and lines in adequate position  []Improved   []Worsening        IMPRESSION:   · Psoas abscess Grab A Beta Hemolytic strep abscess and blood cx sens pending  · Enterococcus faecalis in urine (gent susceptible)  · Sepsis (severe sepsis based on old classification)  · Bacteremia   · Infected pacer pocket  · Paraplegia- likely 2/2 inflammatory venous thrombosis of dural veins adjacent to psoas abscess, resulting in spinal cord infarct  · Recent psoas hematoma  · LISBETH  · NSTEMI   · Hx of CHF and Afib  · Prior complicated pacer placement (apparently upper extremity and central vein stenoses)  · Inability to obtain MRI (pacer)      PLAN:   · Neuro: paraplegia likely 2/2 abscess. Abscess drained by IR. Neurology consulted. PT consulted. Gabapentin discontinuing steroids today neurology agrees. · CV: Type 2 NSTEMI consider outpatient stress eval. Holding BP meds montior for Afib Cardiology consulted. Unable to perform device removal may need lifelong antibiotics  · Resp: aspiration precautions, incentive spirometry  · GI: advance to ADA diet with aspiration precautions; PPI increasing bowel regiment today. · Renal: LISBETH graudally improved with fluids likely 2/2 intrinsic injury with sepsis. On allopurinol for hx of hyperuricemia  · ID: psoas abscess, secondary dural venous thrombosis - con't vanc, gent and flagyl per ID. · Heme: SCDs. Hold chemical prophylaxis with recent bleed and procedure. Hgb is stable  · Endo: Continue Lantus, hyperglycemia due to steroids dc'ing steroids today. Sliding scale.           The patient is: [x] acutely ill Risk of deterioration: [] moderate    [] critically ill  [] high     [x]See my orders for details    My assessment/plan was discussed with:  [x]nursing []PT/OT    [x]respiratory therapy []Neurology   []family []     [x]Total critical care time exclusive of procedures   35    minutes  She is stable for transfer to telemetry today  Bijal Hodges MD

## 2017-04-23 NOTE — PROGRESS NOTES
[facility] - Progress Note  Admit Date: 4/20/2017    Assessment:     Hospital Problems  Date Reviewed: 4/20/2017          Codes Class Noted POA    * (Principal)Acute paraplegia ([facility] [address] ICD-10-CM: G82.20  ICD-9-CM: 344.1  4/20/2017 Yes        Sepsis ([facility] [address] ICD-10-CM: A41.9  ICD-9-CM: 038.9, 995.91  4/20/2017 Yes        History of Coumadin therapy ICD-10-CM: Z79.01  ICD-9-CM: V58.61  Unknown Yes    Overview Signed 4/6/2017 10:35 PM by Merline Fernandez MD     Anticoagulation for chronic atrial fibrillation; Discontinued on 3/30/2017             Decreased calculated glomerular filtration rate (GFR) ICD-10-CM: R94.4  ICD-9-CM: 794.4  3/30/2017 Yes    Overview Signed 4/6/2017  5:47 PM by Merline Fernandez MD     Calculated GFR equivalent to that of CKD stage 3 = 30-59 ml/min             Iliopsoas muscle hematoma ICD-10-CM: S70.10XA  ICD-9-CM: 924.00  3/30/2017 Yes        Psoas hematoma, right, secondary to anticoagulant therapy ICD-10-CM: S30. 1XXA  ICD-9-CM: 924.9, E934.2  3/30/2017 Yes        Impaired mobility and ADLs ICD-10-CM: Z74.09  ICD-9-CM: 799.89  3/30/2017 Yes        Benign hypertensive heart disease with systolic CHF, NYHA class 2 (HCC) (Chronic) ICD-10-CM: I11.0, I50.20  ICD-9-CM: 402.11, 428.20, 428.0  9/5/2012 Yes        Type 2 diabetes mellitus with diabetic neuropathy (HCC) (Chronic) ICD-10-CM: E11.40  ICD-9-CM: 250.60, 357.2  6/28/2011 Yes                  -Sepsis secondary to infected right psoas hematoma/epidural abscess with neurological compromise resulting in paraplegia. Now s/p drain by IR.  -Heart block s/p AICD 2005 with upgrade to BiV later that year, removed however in 2012 due to trauma and infection. New biventricular pacemaker LUQ 9/2012 by Dr. 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 Normal function 3/2017. Patient does report discomfort left upper quadrant following fall last month.  Right axillary vein obstruction by venogram 2012.  -Pacemaker pocket pain, cannot exclude seeding from abscess. Limited options given device dependence and obstruction.   -Elevated troponin likely demand ischemia in setting of sepsis. -H/p transient nonischemic CMY with EF 45-50% 6/2015 with patent coronaries on cath 1996. No ischemia on nuclear 6/2015.  -Acute kidney injury, improving  -Diabetes mellitus.  -Dyslipidemia. -H/o HTN. -LBBB. -HALI on CPAP. -H/o DVT. Plan:     - Continue with antibiotics per ID  - Supportive care per primary team and respective consultants  - Will continue to follow    Subjective:     No new complaints. Feels tired.     Objective:      Patient Vitals for the past 8 hrs:   Temp Pulse Resp BP SpO2   04/23/17 0600 - 79 22 - 99 %   04/23/17 0400 98.3 °F (36.8 °C) 60 - 123/58 98 %         Patient Vitals for the past 96 hrs:   Weight   04/23/17 0602 240 lb 4.8 oz (109 kg)   04/22/17 0542 241 lb 2.9 oz (109.4 kg)   04/21/17 1736 238 lb 8.6 oz (108.2 kg)   04/20/17 1635 250 lb (113.4 kg)                    Intake/Output Summary (Last 24 hours) at 04/23/17 1023  Last data filed at 04/22/17 2049   Gross per 24 hour   Intake              660 ml   Output              800 ml   Net             -140 ml       Physical Exam:  General:  alert, cooperative, no distress  Neck:  nontender, no JVD  Lungs:  clear to auscultation bilaterally  Heart:  regular rate and rhythm, S1, S2 normal, no murmur, click, rub or gallop  Abdomen:  abdomen is soft with mild tenderness over pacer site , masses, organomegaly or guarding  Extremities:  extremities normal, atraumatic, no cyanosis or edema    Data Review:     Labs: Results:       Chemistry Recent Labs      04/23/17   0832  04/23/17   0650  04/22/17   0330  04/21/17   0452   GLU  254*   --   225*  219*   NA  139   --   135*  136   K  4.5   --   4.0  4.0   CL  105   --   101  102   CO2  25   --   26  27   BUN  49*   --   40*  30*   CREA  1.37*  1.44*  1.57*  1.96*   CA  8.5   --   8.2*  8.2*   MG   --    --    --   1.8 AGAP  9   --   8  7   BUCR  36*   --   25*  15   AP   --    --    --   144*   TP   --    --    --   6.4   ALB   --    --    --   2.5*   GLOB   --    --    --   3.9   AGRAT   --    --    --   0.6*      CBC w/Diff Recent Labs      04/23/17   0832  04/21/17 0452   WBC  16.6*  23.4*   RBC  3.21*  2.95*   HGB  8.4*  7.6*   HCT  24.4*  22.7*   PLT  205  141   GRANS   --   75   LYMPH   --   5*   EOS   --   0      Cardiac Enzymes Lab Results   Component Value Date/Time    TROIQ 0.38 (H) 04/23/2017 06:50 AM      Coagulation Recent Labs      04/21/17 0452   PTP  17.6*   INR  1.5*       Lipid Panel Lab Results   Component Value Date/Time    Cholesterol, total 154 07/24/2012 01:00 AM    HDL Cholesterol 48 07/24/2012 01:00 AM    LDL, calculated 90.4 07/24/2012 01:00 AM    VLDL, calculated 15.6 07/24/2012 01:00 AM    Triglyceride 78 07/24/2012 01:00 AM    CHOL/HDL Ratio 3.2 07/24/2012 01:00 AM      BNP Lab Results   Component Value Date/Time    B-type Natriuretic Peptide 111.9 11/19/2013 10:17 AM      Liver Enzymes Recent Labs      04/21/17 0452   TP  6.4   ALB  2.5*   AP  144*   SGOT  54*      Digoxin    Thyroid Studies Lab Results   Component Value Date/Time    TSH 0.51 04/20/2017 07:26 PM          Signed By: Rivka Calderon.  Luna Haynes     April 23, 2017

## 2017-04-23 NOTE — PROGRESS NOTES
0730 Received report from off going RN. Patient alert and oriented. Triple lumen catheter intact left groin. 1300 Sitting up in bed visiting with family Jessica Aguilar within reach    [id number] enema given. Patient with return of loose stool. Repositioned for comfort. 1930 Bedside and Verbal shift change report given to Cailin foreman rn (oncoming nurse) by Lennox Blood (offgoing nurse). Report included the following information SBAR, Kardex and MAR.

## 2017-04-23 NOTE — PROGRESS NOTES
Re:  Elizabeth Gonzalez,Follow up visit     4/23/2017 1:58 PM      SSN: [id number]    Subjective:   Warden Parks seen with her daughter in the room. Medications:    Current Facility-Administered Medications   Medication Dose Route Frequency Provider Last Rate Last Dose    [START ON 4/24/2017] Gentamicin in Saline (Iso-osm) (GARAMYCIN) 100 mg/100 mL IVPB premix 100 mg  100 mg IntraVENous Q24H Kalpana Ruth MD        [START ON 4/24/2017] Gentamicin trough level due 4/24/17 at 0930  1 Each Other Mariam Rondon MD        [START ON 4/24/2017] Gentamicin peak level due 4/24/17 at 1100  1 Each Other ONCE Elvie Mayes MD        vancomycin (VANCOCIN) 1,500 mg in 0.9% sodium chloride 500 mL IVPB  1,500 mg IntraVENous Mariam Rondon MD        [START ON 4/24/2017] Vancomycin random level due 4/24/17 at 1300  1 Each Other Mariam Rondon MD        diphenhydrAMINE (BENADRYL) capsule 50 mg  50 mg Oral Q4H PRN Darwin Stroud MD   50 mg at 04/22/17 0406    polyethylene glycol (MIRALAX) packet 17 g  17 g Oral TID Kevin Shahid MD   17 g at 04/23/17 1011    insulin glargine (LANTUS) injection 20 Units  20 Units SubCUTAneous QHS Kevin Shahid MD   20 Units at 04/22/17 2335    insulin glargine (LANTUS) injection 10 Units  10 Units SubCUTAneous DAILY Savita Lamb MD   10 Units at 04/23/17 1018    cholecalciferol (VITAMIN D3) tablet 2,000 Units  2,000 Units Oral DAILY Joanne Frank MD   2,000 Units at 04/23/17 1010    docusate sodium (COLACE) capsule 100 mg  100 mg Oral DAILY AFTER Yesenia Garay MD   100 mg at 04/23/17 1010    gabapentin (NEURONTIN) capsule 400 mg  400 mg Oral BID Joanne Frank MD   400 mg at 04/23/17 1010    oxyCODONE-acetaminophen (PERCOCET 7.5) 7.5-325 mg per tablet 1 Tab  1 Tab Oral Q4H PRN Joanne Frank MD   1 Tab at 04/22/17 0056    pravastatin (PRAVACHOL) tablet 40 mg  40 mg Oral QHS Joanne Frank MD   40 mg at 04/22/17 3860    senna-docusate (PERICOLACE) 8.6-50 mg per tablet 2 Tab  2 Tab Oral PCD Kevin Bergeron MD   2 Tab at 04/22/17 1722    acetaminophen (TYLENOL) tablet 500 mg  500 mg Oral Q6H PRN Kevin Bergeron MD   500 mg at 04/20/17 1630    allopurinol (ZYLOPRIM) tablet 100 mg  100 mg Oral DAILY Kevin Bergeron MD   100 mg at 04/23/17 1010    clotrimazole (MYCELEX) frances 10 mg  10 mg Oral 5XD Kevin Bergeron MD   10 mg at 86/04/70 7163    folic acid (FOLVITE) tablet 1 mg  1 mg Oral DAILY Kevin Bergeron MD   1 mg at 04/23/17 1010    ondansetron (ZOFRAN) injection 4 mg  4 mg IntraVENous Q8H PRN Kevin Bergeron MD        VANCOMYCIN INFORMATION NOTE   Other CONTINUOUS Farrah Nelson  mL/hr at 04/20/17 1730      insulin lispro (HUMALOG) injection   SubCUTAneous Q6H Mena Deluca MD   9 Units at 04/23/17 1240    glucose chewable tablet 16 g  16 g Oral PRN Kevin Bergeron MD        glucagon (GLUCAGEN) injection 1 mg  1 mg IntraMUSCular PRN Kevin Bergeron MD        dextrose (D50W) injection syrg 12.5-25 g  25-50 mL IntraVENous PRN Kevin Bergeron MD        metroNIDAZOLE (FLAGYL) IVPB premix 500 mg  500 mg IntraVENous Q8H Christal Devlin  mL/hr at 04/23/17 1347 500 mg at 04/23/17 1347    pantoprazole (PROTONIX) 40 mg in sodium chloride 0.9 % 10 mL injection  40 mg IntraVENous DAILY Christal Devlin MD   40 mg at 04/23/17 1011       Vital signs:    Visit Vitals    /58    Pulse 79    Temp 98.3 °F (36.8 °C)    Resp 22    Wt 109 kg (240 lb 4.8 oz)    SpO2 99%    BMI 37.64 kg/m2       Review of Systems:   As above otherwise 11 point review of systems negative including;   Constitutional no fever or chills  Skin denies rash or itching  HEENT  Denies tinnitus, hearing lose  Eyes denies diplopia vision lose  Respiratory denies sortness of breath  Cardiovascular denies chest pain, dyspnea on exertion  Gastrointestinal denies nausea, vomiting, diarrhea, constipation  Genitourinary denies incontinence, perez in place. No bowel movements. Musculoskeletal denies joint pain or swelling  Endocrine denies weight change  Hematology denies easy bruising or bleeding   Neurological as above in HPI      Patient Active Problem List   Diagnosis Code    Nonischemic cardiomyopathy ([facility] I42.8    History of complete heart block Z86.79    Biventricular implantable cardioverter-defibrillator in situ Z95.810    Left bundle branch block (LBBB) on electrocardiogram I44.7    Type 2 diabetes mellitus with diabetic neuropathy ([facility]) E11.40    Dyslipidemia E78.5    Diabetic neuropathy associated with type 2 diabetes mellitus ([facility] E11.40    Obstructive sleep apnea on CPAP G47.33, Z99.89    AICD generator infection ([facility] T82. 7XXA    Difficult airway for intubation T88. 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 1XXA    Impaired mobility and ADLs Z74.09    History of Coumadin therapy Z79.01    Gout M10.9    Acute paraplegia ([facility]) G82.20    Sepsis ([facility]) A41.9         Objective: The patient is awake, alert, and oriented x 4. Fund of knowledge is adequate. Speech is fluent and memory is intact. Cranial Nerves: II  Visual fields are full to confrontation. III, IV, VI  Extraocular movements are intact. There is no nystagmus. V  Facial sensation is intact to pinprick. VII  Face is symmetrical.  VIII - Hearing is present.   IX, X, XII  Palate is symmetrical.   XI - Shoulder shrugging and head turning intact  Motor: The patient is plegic in both legs, no movement at all. Tone is flaccid in the legs. Plantars are mute. Gait is testing can't be preformed because of condition. Currently has a T9 sensory level bilaterally. CBC:   Lab Results   Component Value Date/Time    WBC 16.6 04/23/2017 08:32 AM    RBC 3.21 04/23/2017 08:32 AM    HGB 8.4 04/23/2017 08:32 AM    HCT 24.4 04/23/2017 08:32 AM    PLATELET 987 89/10/7080 08:32 AM     BMP:   Lab Results   Component Value Date/Time    Glucose 254 04/23/2017 08:32 AM    Sodium 139 04/23/2017 08:32 AM    Potassium 4.5 04/23/2017 08:32 AM    Chloride 105 04/23/2017 08:32 AM    CO2 25 04/23/2017 08:32 AM    BUN 49 04/23/2017 08:32 AM    Creatinine 1.37 04/23/2017 08:32 AM    Calcium 8.5 04/23/2017 08:32 AM     CMP:   Lab Results   Component Value Date/Time    Glucose 254 04/23/2017 08:32 AM    Sodium 139 04/23/2017 08:32 AM    Potassium 4.5 04/23/2017 08:32 AM    Chloride 105 04/23/2017 08:32 AM    CO2 25 04/23/2017 08:32 AM    BUN 49 04/23/2017 08:32 AM    Creatinine 1.37 04/23/2017 08:32 AM    Calcium 8.5 04/23/2017 08:32 AM    Anion gap 9 04/23/2017 08:32 AM    BUN/Creatinine ratio 36 04/23/2017 08:32 AM    Alk. phosphatase 144 04/21/2017 04:52 AM    Protein, total 6.4 04/21/2017 04:52 AM    Albumin 2.5 04/21/2017 04:52 AM    Globulin 3.9 04/21/2017 04:52 AM    A-G Ratio 0.6 04/21/2017 04:52 AM     Coagulation:   Lab Results   Component Value Date/Time    Prothrombin time 17.6 04/21/2017 04:52 AM    INR 1.5 04/21/2017 04:52 AM    aPTT 47.9 03/30/2017 05:35 PM     Cardiac markers:   Lab Results   Component Value Date/Time     04/20/2017 09:33 AM    CK-MB Index 0.3 04/20/2017 09:33 AM       Assessment:  Paraplegia in this patient with a right iliopsoas abscess, post drainage of 300 ml of purulent fluid. Also seems to be frankly septic at this time.    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 Plan:  Not much we can do to resolve her neurologic problem. Needs antibiotics. Will follow. Sincerely,        Lacie Mattson.  Gabriel Olmstead M.D.

## 2017-04-23 NOTE — PROGRESS NOTES
[facility] Hospitalist Group  Progress Note    Patient: Mareclla Finnegan Age: 77 y.o. : 1950 MR#: [id number] SSN: [id number]  Date/Time: 2017     Subjective:     Feels better   Some sensation in left legs   Weakness present both Lower limbs     Assessment/Plan:    77 yr old Female with PMH of iliopsoas hematoma , patient developed sudden onset of Right  leg weakness , patient evaluated by rehab MD and was transferred to medical service for further management . Dr Angelina Oneal - spine surgery consulted , neurology consulted , patient is admitted to ICU    1 Right Leg weakness   - Right Psoas hematoma , increase in size , IR drained Hematoma ( Post op diagnosis - infected hematoma- 300cc of pus aspirated  ) on Vanco , Genta and Flagyl   - significant leucocytosis     2 CMO - LBBB, - followed by cardiology     3 DM2- increase dose of Lantus to 20 Units Bid , continue SSI   - blood glucose are high/ infection related stress      4 H/O DVT off  AC - due to Bleed     5 Elevated trop - From stress , S/B cardi - Continue current treatments     6 Acute blood loss anemia   - Monitor H/H , BT if h/h < 7     7 UTI- POA   - Continue current antibiotics   - Follow urine culture       Case discussed with:  [x]Patient  [x]Family  [x]Nursing  []Case Management  DVT Prophylaxis:  []Lovenox  []Hep SQ  [x]SCDs  []Coumadin   []On Heparin gtt    Patient Active Problem List   Diagnosis Code    Nonischemic cardiomyopathy ([facility] I42.8    History of complete heart block Z86.79    Biventricular implantable cardioverter-defibrillator in situ Z95.810    Left bundle branch block (LBBB) on electrocardiogram I44.7    Type 2 diabetes mellitus with diabetic neuropathy (HCC) E11.40    Dyslipidemia E78.5    Diabetic neuropathy associated with type 2 diabetes mellitus ([facility] E11.40    Obstructive sleep apnea on CPAP G47.33, Z99.89    [facility]ICD generator infection ([facility] T82. 7XXA    Difficult airway for intubation T88. 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 1XXA    Impaired mobility and ADLs Z74.09    History of Coumadin therapy Z79.01    Gout M10.9    Acute paraplegia ([facility]) G82.20    Sepsis ([facility]) A41.9       Objective:   VS:   Visit Vitals    /58    Pulse 79    Temp 98.3 °F (36.8 °C)    Resp 22    Wt 109 kg (240 lb 4.8 oz)    SpO2 99%    BMI 37.64 kg/m2      Tmax/24hrs: Temp (24hrs), Av.8 °F (36.6 °C), Min:97.4 °F (36.3 °C), Max:98.3 °F (36.8 °C)  IOBRIEF    Intake/Output Summary (Last 24 hours) at 17 1410  Last data filed at 17 0600   Gross per 24 hour   Intake             1490 ml   Output              800 ml   Net              690 ml       General:  Ill appearing   HEENT:  NC, Atraumatic. PERRLA, anicteric sclerae. Pulmonary:  CTA Bilaterally. No Wheezing/Rhonchi/Rales. Cardiovascular: Regular rate and Rhythm. GI:  Soft, Non distended, Non tender. + Bowel sounds. Extremities:  No edema, cyanosis, clubbing. No calf tenderness. Psych:  Not anxious or agitated. Neurologic: Grossly - Motor and Sensory functions are intact .  No Anxiety , calm , no Agitation         Medications:   Current Facility-Administered Medications   Medication Dose Route Frequency    [START ON 2017] Gentamicin in Saline (Iso-osm) (GARAMYCIN) 100 mg/100 mL IVPB premix 100 mg  100 mg IntraVENous Q24H    [START ON 2017] Gentamicin trough level due 17 at 0930  1 Each Other ONCE    [START ON 4/24/2017] Gentamicin peak level due 4/24/17 at 1100  1 Each Other ONCE    vancomycin (VANCOCIN) 1,500 mg in 0.9% sodium chloride 500 mL IVPB  1,500 mg IntraVENous ONCE    [START ON 4/24/2017] Vancomycin random level due 4/24/17 at 1300  1 Each Other ONCE    diphenhydrAMINE (BENADRYL) capsule 50 mg  50 mg Oral Q4H PRN    polyethylene glycol (MIRALAX) packet 17 g  17 g Oral TID    insulin glargine (LANTUS) injection 20 Units  20 Units SubCUTAneous QHS    insulin glargine (LANTUS) injection 10 Units  10 Units SubCUTAneous DAILY    cholecalciferol (VITAMIN D3) tablet 2,000 Units  2,000 Units Oral DAILY    docusate sodium (COLACE) capsule 100 mg  100 mg Oral DAILY AFTER BREAKFAST    gabapentin (NEURONTIN) capsule 400 mg  400 mg Oral BID    oxyCODONE-acetaminophen (PERCOCET 7.5) 7.5-325 mg per tablet 1 Tab  1 Tab Oral Q4H PRN    pravastatin (PRAVACHOL) tablet 40 mg  40 mg Oral QHS    senna-docusate (PERICOLACE) 8.6-50 mg per tablet 2 Tab  2 Tab Oral PCD    acetaminophen (TYLENOL) tablet 500 mg  500 mg Oral Q6H PRN    allopurinol (ZYLOPRIM) tablet 100 mg  100 mg Oral DAILY    clotrimazole (MYCELEX) frances 10 mg  10 mg Oral 5XD    folic acid (FOLVITE) tablet 1 mg  1 mg Oral DAILY    ondansetron (ZOFRAN) injection 4 mg  4 mg IntraVENous Q8H PRN    VANCOMYCIN INFORMATION NOTE   Other CONTINUOUS    insulin lispro (HUMALOG) injection   SubCUTAneous Q6H    glucose chewable tablet 16 g  16 g Oral PRN    glucagon (GLUCAGEN) injection 1 mg  1 mg IntraMUSCular PRN    dextrose (D50W) injection syrg 12.5-25 g  25-50 mL IntraVENous PRN    metroNIDAZOLE (FLAGYL) IVPB premix 500 mg  500 mg IntraVENous Q8H    pantoprazole (PROTONIX) 40 mg in sodium chloride 0.9 % 10 mL injection  40 mg IntraVENous DAILY       Labs:    Recent Results (from the past 24 hour(s))   GLUCOSE, POC    Collection Time: 04/22/17  5:18 PM   Result Value Ref Range    Glucose (POC) 274 (H) 70 - 110 mg/dL   GLUCOSE, POC    Collection Time: 04/22/17 11:22 PM   Result Value Ref Range    Glucose (POC) 353 (H) 70 - 110 mg/dL   CREATININE    Collection Time: 04/23/17  6:50 AM   Result Value Ref Range    Creatinine 1.44 (H) 0.6 - 1.3 MG/DL    GFR est AA 44 (L) >60 ml/min/1.73m2    GFR est non-AA 36 (L) >60 ml/min/1.73m2   TROPONIN I    Collection Time: 04/23/17  6:50 AM   Result Value Ref Range    Troponin-I, Qt. 0.38 (H) 0.0 - 0.045 NG/ML   GLUCOSE, POC    Collection Time: 04/23/17  6:52 AM   Result Value Ref Range    Glucose (POC) 270 (H) 70 - 110 mg/dL   GENTAMICIN, TROUGH    Collection Time: 04/23/17  8:24 AM   Result Value Ref Range    Gentamicin, trough 1.9 ug/ml    Reported dose date: [id number]      Reported dose time: 1400      Reported dose: 100 MG UNITS   CBC W/O DIFF    Collection Time: 04/23/17  8:32 AM   Result Value Ref Range    WBC 16.6 (H) 4.6 - 13.2 K/uL    RBC 3.21 (L) 4.20 - 5.30 M/uL    HGB 8.4 (L) 12.0 - 16.0 g/dL    HCT 24.4 (L) 35.0 - 45.0 %    MCV 76.0 74.0 - 97.0 FL    MCH 26.2 24.0 - 34.0 PG    MCHC 34.4 31.0 - 37.0 g/dL    RDW 14.8 (H) 11.6 - 14.5 %    PLATELET 810 329 - 176 K/uL    MPV 9.6 9.2 - 52.5 FL   METABOLIC PANEL, BASIC    Collection Time: 04/23/17  8:32 AM   Result Value Ref Range    Sodium 139 136 - 145 mmol/L    Potassium 4.5 3.5 - 5.5 mmol/L    Chloride 105 100 - 108 mmol/L    CO2 25 21 - 32 mmol/L    Anion gap 9 3.0 - 18 mmol/L    Glucose 254 (H) 74 - 99 mg/dL    BUN 49 (H) 7.0 - 18 MG/DL    Creatinine 1.37 (H) 0.6 - 1.3 MG/DL    BUN/Creatinine ratio 36 (H) 12 - 20      GFR est AA 47 (L) >60 ml/min/1.73m2    GFR est non-AA 39 (L) >60 ml/min/1.73m2    Calcium 8.5 8.5 - 10.1 MG/DL   VANCOMYCIN, RANDOM    Collection Time: 04/23/17 11:28 AM   Result Value Ref Range    VANCOMYCIN,RANDOM 14.3 5.0 - 40.0 UG/ML   GLUCOSE, POC    Collection Time: 04/23/17 12:39 PM   Result Value Ref Range    Glucose (POC) 269 (H) 70 - 110 mg/dL       Signed By: Herman Kay MD     April 23, 2017

## 2017-04-23 NOTE — PROGRESS NOTES
Patient seen and examined at bedside    Awake resting comfortably     Alert oriented x 3    Past 24 hours had \"sensation to left heel that came and went\". Wearing heel protectors. No CP, SOB, FCNS. Plegia of [place] generally unchanged, with the exception on sensation testing today, (+) trace left lateral knee sensation.     Plan: Per Spine, comfort measures, sepsis management

## 2017-04-24 ENCOUNTER — APPOINTMENT (OUTPATIENT)
Dept: ULTRASOUND IMAGING | Age: 67
DRG: 853 | End: 2017-04-24
Attending: INTERNAL MEDICINE
Payer: COMMERCIAL

## 2017-04-24 PROBLEM — B95.2 URINARY TRACT INFECTION DUE TO ENTEROCOCCUS: Status: ACTIVE | Noted: 2017-04-20

## 2017-04-24 PROBLEM — K68.12 PSOAS ABSCESS, RIGHT (HCC): Status: ACTIVE | Noted: 2017-04-20

## 2017-04-24 PROBLEM — A49.1 GROUP B STREPTOCOCCAL INFECTION: Status: ACTIVE | Noted: 2017-04-20

## 2017-04-24 PROBLEM — N39.0 URINARY TRACT INFECTION DUE TO ENTEROCOCCUS: Status: ACTIVE | Noted: 2017-04-20

## 2017-04-24 PROBLEM — Z97.8 FOLEY CATHETER IN PLACE ON ADMISSION: Status: ACTIVE | Noted: 2017-04-20

## 2017-04-24 LAB
ANION GAP BLD CALC-SCNC: 4 MMOL/L (ref 3–18)
BUN SERPL-MCNC: 50 MG/DL (ref 7–18)
BUN/CREAT SERPL: 39 (ref 12–20)
CALCIUM SERPL-MCNC: 8.5 MG/DL (ref 8.5–10.1)
CHLORIDE SERPL-SCNC: 106 MMOL/L (ref 100–108)
CO2 SERPL-SCNC: 29 MMOL/L (ref 21–32)
CREAT SERPL-MCNC: 1.29 MG/DL (ref 0.6–1.3)
ERYTHROCYTE [DISTWIDTH] IN BLOOD BY AUTOMATED COUNT: 15 % (ref 11.6–14.5)
GLUCOSE BLD STRIP.AUTO-MCNC: 153 MG/DL (ref 70–110)
GLUCOSE BLD STRIP.AUTO-MCNC: 214 MG/DL (ref 70–110)
GLUCOSE BLD STRIP.AUTO-MCNC: 227 MG/DL (ref 70–110)
GLUCOSE BLD STRIP.AUTO-MCNC: 83 MG/DL (ref 70–110)
GLUCOSE SERPL-MCNC: 239 MG/DL (ref 74–99)
HCT VFR BLD AUTO: 23.4 % (ref 35–45)
HGB BLD-MCNC: 7.9 G/DL (ref 12–16)
MCH RBC QN AUTO: 25.6 PG (ref 24–34)
MCHC RBC AUTO-ENTMCNC: 33.8 G/DL (ref 31–37)
MCV RBC AUTO: 76 FL (ref 74–97)
PLATELET # BLD AUTO: 195 K/UL (ref 135–420)
PMV BLD AUTO: 9.7 FL (ref 9.2–11.8)
POTASSIUM SERPL-SCNC: 4.5 MMOL/L (ref 3.5–5.5)
RBC # BLD AUTO: 3.08 M/UL (ref 4.2–5.3)
SODIUM SERPL-SCNC: 139 MMOL/L (ref 136–145)
WBC # BLD AUTO: 13.1 K/UL (ref 4.6–13.2)

## 2017-04-24 PROCEDURE — 74011636637 HC RX REV CODE- 636/637: Performed by: INTERNAL MEDICINE

## 2017-04-24 PROCEDURE — 97162 PT EVAL MOD COMPLEX 30 MIN: CPT

## 2017-04-24 PROCEDURE — 85027 COMPLETE CBC AUTOMATED: CPT | Performed by: HOSPITALIST

## 2017-04-24 PROCEDURE — 74011000250 HC RX REV CODE- 250: Performed by: INTERNAL MEDICINE

## 2017-04-24 PROCEDURE — 80048 BASIC METABOLIC PNL TOTAL CA: CPT | Performed by: HOSPITALIST

## 2017-04-24 PROCEDURE — 74011250636 HC RX REV CODE- 250/636: Performed by: INTERNAL MEDICINE

## 2017-04-24 PROCEDURE — 74011250637 HC RX REV CODE- 250/637: Performed by: INTERNAL MEDICINE

## 2017-04-24 PROCEDURE — 76705 ECHO EXAM OF ABDOMEN: CPT

## 2017-04-24 PROCEDURE — 74011250637 HC RX REV CODE- 250/637: Performed by: HOSPITALIST

## 2017-04-24 PROCEDURE — C9113 INJ PANTOPRAZOLE SODIUM, VIA: HCPCS | Performed by: INTERNAL MEDICINE

## 2017-04-24 PROCEDURE — 97530 THERAPEUTIC ACTIVITIES: CPT

## 2017-04-24 PROCEDURE — 65660000000 HC RM CCU STEPDOWN

## 2017-04-24 PROCEDURE — 74011000258 HC RX REV CODE- 258: Performed by: INTERNAL MEDICINE

## 2017-04-24 PROCEDURE — 82962 GLUCOSE BLOOD TEST: CPT

## 2017-04-24 RX ORDER — INSULIN LISPRO 100 [IU]/ML
5 INJECTION, SOLUTION INTRAVENOUS; SUBCUTANEOUS
Status: DISCONTINUED | OUTPATIENT
Start: 2017-04-24 | End: 2017-04-26

## 2017-04-24 RX ADMIN — INSULIN LISPRO 6 UNITS: 100 INJECTION, SOLUTION INTRAVENOUS; SUBCUTANEOUS at 12:33

## 2017-04-24 RX ADMIN — Medication 2 TABLET: at 17:43

## 2017-04-24 RX ADMIN — GABAPENTIN 400 MG: 400 CAPSULE ORAL at 17:43

## 2017-04-24 RX ADMIN — CLOTRIMAZOLE 10 MG: 10 LOZENGE ORAL at 09:29

## 2017-04-24 RX ADMIN — INSULIN LISPRO 3 UNITS: 100 INJECTION, SOLUTION INTRAVENOUS; SUBCUTANEOUS at 17:45

## 2017-04-24 RX ADMIN — SODIUM CHLORIDE 40 MG: 9 INJECTION INTRAMUSCULAR; INTRAVENOUS; SUBCUTANEOUS at 09:29

## 2017-04-24 RX ADMIN — ALLOPURINOL 100 MG: 100 TABLET ORAL at 09:29

## 2017-04-24 RX ADMIN — CHOLECALCIFEROL TAB 25 MCG (1000 UNIT) 2000 UNITS: 25 TAB at 09:41

## 2017-04-24 RX ADMIN — INSULIN GLARGINE 20 UNITS: 100 INJECTION, SOLUTION SUBCUTANEOUS at 09:29

## 2017-04-24 RX ADMIN — PRAVASTATIN SODIUM 40 MG: 20 TABLET ORAL at 23:09

## 2017-04-24 RX ADMIN — FOLIC ACID 1 MG: 1 TABLET ORAL at 09:28

## 2017-04-24 RX ADMIN — GABAPENTIN 400 MG: 400 CAPSULE ORAL at 09:29

## 2017-04-24 RX ADMIN — CLOTRIMAZOLE 10 MG: 10 LOZENGE ORAL at 12:32

## 2017-04-24 RX ADMIN — METRONIDAZOLE 500 MG: 500 INJECTION, SOLUTION INTRAVENOUS at 05:00

## 2017-04-24 RX ADMIN — INSULIN LISPRO 5 UNITS: 100 INJECTION, SOLUTION INTRAVENOUS; SUBCUTANEOUS at 12:34

## 2017-04-24 RX ADMIN — INSULIN GLARGINE 20 UNITS: 100 INJECTION, SOLUTION SUBCUTANEOUS at 23:36

## 2017-04-24 RX ADMIN — DOCUSATE SODIUM 100 MG: 100 CAPSULE, LIQUID FILLED ORAL at 09:29

## 2017-04-24 RX ADMIN — CLOTRIMAZOLE 10 MG: 10 LOZENGE ORAL at 17:43

## 2017-04-24 RX ADMIN — INSULIN LISPRO 6 UNITS: 100 INJECTION, SOLUTION INTRAVENOUS; SUBCUTANEOUS at 06:22

## 2017-04-24 RX ADMIN — CLOTRIMAZOLE 10 MG: 10 LOZENGE ORAL at 14:56

## 2017-04-24 RX ADMIN — CLOTRIMAZOLE 10 MG: 10 LOZENGE ORAL at 23:10

## 2017-04-24 RX ADMIN — ACETAMINOPHEN 500 MG: 500 TABLET, COATED ORAL at 23:08

## 2017-04-24 RX ADMIN — POLYETHYLENE GLYCOL 3350 17 G: 17 POWDER, FOR SOLUTION ORAL at 17:43

## 2017-04-24 RX ADMIN — INSULIN LISPRO 5 UNITS: 100 INJECTION, SOLUTION INTRAVENOUS; SUBCUTANEOUS at 17:44

## 2017-04-24 RX ADMIN — POLYETHYLENE GLYCOL 3350 17 G: 17 POWDER, FOR SOLUTION ORAL at 09:30

## 2017-04-24 RX ADMIN — CEFTRIAXONE SODIUM 2 G: 2 INJECTION, POWDER, FOR SOLUTION INTRAMUSCULAR; INTRAVENOUS at 12:32

## 2017-04-24 NOTE — PROGRESS NOTES
[facility] - Progress Note  Admit Date: 4/20/2017    Assessment:     Hospital Problems  Date Reviewed: 4/20/2017          Codes Class Noted POA    * (Principal)Acute paraplegia ([facility] ICD-10-CM: G82.20  ICD-9-CM: 344.1  4/20/2017 Yes        Sepsis ([facility] ICD-10-CM: A41.9  ICD-9-CM: 038.9, 995.91  4/20/2017 Yes        History of Coumadin therapy ICD-10-CM: Z79.01  ICD-9-CM: V58.61  Unknown Yes    Overview Signed 4/6/2017 10:35 PM by Aleksandar Concepcion MD     Anticoagulation for chronic atrial fibrillation; Discontinued on 3/30/2017             Decreased calculated glomerular filtration rate (GFR) ICD-10-CM: R94.4  ICD-9-CM: 794.4  3/30/2017 Yes    Overview Signed 4/6/2017  5:47 PM by Aleksandar Concepcion MD     Calculated GFR equivalent to that of CKD stage 3 = 30-59 ml/min             Iliopsoas muscle hematoma ICD-10-CM: S70.10XA  ICD-9-CM: 924.00  3/30/2017 Yes        Psoas hematoma, right, secondary to anticoagulant therapy ICD-10-CM: S30. 1XXA  ICD-9-CM: 924.9, E934.2  3/30/2017 Yes        Impaired mobility and ADLs ICD-10-CM: Z74.09  ICD-9-CM: 799.89  3/30/2017 Yes        Benign hypertensive heart disease with systolic CHF, NYHA class 2 (HCC) (Chronic) ICD-10-CM: I11.0, I50.20  ICD-9-CM: 402.11, 428.20, 428.0  9/5/2012 Yes        Type 2 diabetes mellitus with diabetic neuropathy (HCC) (Chronic) ICD-10-CM: E11.40  ICD-9-CM: 250.60, 357.2  6/28/2011 Yes                    -Sepsis secondary to infected right psoas hematoma/epidural abscess with neurological compromise resulting in paraplegia. Now s/p drain by IR.  -Heart block s/p AICD 2005 with upgrade to BiV later that year, removed however in 2012 due to trauma and infection. New biventricular pacemaker LUQ 9/2012 by Dr. Rito Sotelo and revision 10/2012 due to Twiddler's syndrome. Pacemaker dependent. Normal function 3/2017. Patient does report discomfort left upper quadrant following fall last month.  Right axillary vein obstruction by venogram 2012.  -Pacemaker pocket pain, cannot exclude seeding from abscess. Limited options given device dependence and obstruction.   -Elevated troponin likely demand ischemia in setting of sepsis. -H/p transient nonischemic CMY with EF 45% (45-50% 6/2015 with patent coronaries on cath 1996). No ischemia on nuclear 6/2015.  -Acute kidney injury, improving  -Diabetes mellitus.  -Dyslipidemia. -H/o HTN. -LBBB. -HALI on CPAP. -H/o DVT. Plan:     Appreciate ID involvement, continue abx as outlined, ppm site pain with some improvement, will await results of abdominal ultrasound. Subjective:     Feels tired, wiped out. Reports some improvement in ppm site pain. Still unable to move lower extremities.     Objective:      Patient Vitals for the past 8 hrs:   Temp Pulse Resp BP SpO2   04/24/17 1000 - 80 20 109/50 100 %   04/24/17 0800 97.8 °F (36.6 °C) 62 16 127/56 99 %   04/24/17 0400 97.6 °F (36.4 °C) 63 14 123/60 98 %         Patient Vitals for the past 96 hrs:   Weight   04/24/17 0800 116.4 kg (256 lb 9.9 oz)   04/23/17 0602 109 kg (240 lb 4.8 oz)   04/22/17 0542 109.4 kg (241 lb 2.9 oz)   04/21/17 1736 108.2 kg (238 lb 8.6 oz)   04/20/17 1635 113.4 kg (250 lb)                    Intake/Output Summary (Last 24 hours) at 04/24/17 1052  Last data filed at 04/24/17 0800   Gross per 24 hour   Intake               10 ml   Output             1250 ml   Net            -1240 ml       Physical Exam:  General:  alert, cooperative, no distress, appears stated age  Neck:  no JVD  Lungs:  clear to auscultation bilaterally  Heart:  regular rate and rhythm  Abdomen:  abdomen is soft without significant tenderness, masses, organomegaly or guarding  Extremities:  extremities normal, atraumatic, no cyanosis or edema    Data Review:     Labs: Results:       Chemistry Recent Labs      04/24/17   0400  04/23/17   0832  04/23/17   0650  04/22/17   0330   GLU  239*  254*   --   225*   NA  139  139   --   135*   K  4.5  4.5   --   4.0   CL  106  105   --   101 CO2  29  25   --   26   BUN  50*  49*   --   40*   CREA  1.29  1.37*  1.44*  1.57*   CA  8.5  8.5   --   8.2*   AGAP  4  9   --   8   BUCR  39*  36*   --   25*      CBC w/Diff Recent Labs      04/24/17   0400  04/23/17   0832   WBC  13.1  16.6*   RBC  3.08*  3.21*   HGB  7.9*  8.4*   HCT  23.4*  24.4*   PLT  195  205      Cardiac Enzymes No results found for: CPK, CKMMB, CKMB, RCK3, CKMBT, CKNDX, CKND1, CARLI, TROPT, TROIQ, AMBERLY, TROPT, TNIPOC, BNP, BNPP   Coagulation No results for input(s): PTP, INR, APTT in the last 72 hours. No lab exists for component: INREXT    Lipid Panel Lab Results   Component Value Date/Time    Cholesterol, total 154 07/24/2012 01:00 AM    HDL Cholesterol 48 07/24/2012 01:00 AM    LDL, calculated 90.4 07/24/2012 01:00 AM    VLDL, calculated 15.6 07/24/2012 01:00 AM    Triglyceride 78 07/24/2012 01:00 AM    CHOL/HDL Ratio 3.2 07/24/2012 01:00 AM      BNP Lab Results   Component Value Date/Time    B-type Natriuretic Peptide 111.9 11/19/2013 10:17 AM      Liver Enzymes No results for input(s): TP, ALB, TBIL, AP, SGOT, GPT in the last 72 hours.     No lab exists for component: DBIL   Digoxin    Thyroid Studies Lab Results   Component Value Date/Time    TSH 0.51 04/20/2017 07:26 PM          Signed By: LUDMILA Kincaid     April 24, 2017

## 2017-04-24 NOTE — PROGRESS NOTES
[facility]  Progress Note    Patient: Nikolas Chappell Age: 77 y.o. : 1950 MR#: [id number] SSN: [id number]  Date/Time: 2017     Subjective:     Feels better   Some sensation in left legs   Weakness present both Lower limbs     Assessment/Plan:    77 yr old Female with PMH of iliopsoas hematoma , patient developed sudden onset of Right  leg weakness , patient evaluated by rehab MD and was transferred to medical service for further management . Dr Jason Camejo - spine surgery consulted , neurology consulted , patient is admitted to ICU    Patient has Pacemaker - ?  Source of infection     1 Right Leg weakness   - Right Psoas hematoma , increase in size , IR drained Hematoma ( Post op diagnosis - infected hematoma- 300cc of pus aspirated  ) off Vanco , Genta and Flagyl - now on Ceftriaxone - s/b ID   - significant leucocytosis     2 CMO - LBBB, - followed by cardiology     3 DM2  - Dose of Long acting insulin done , still very high blood glucose   - add meal time insulin .     4 H/O DVT off  AC - due to Bleed     5 Elevated trop - From stress , S/B cardi - Continue current treatments     6 Acute blood loss anemia   - Monitor H/H , BT if h/h < 7     7 UTI- POA   - Continue current antibiotics   - Follow urine culture       Case discussed with:  [x]Patient  [x]Family  [x]Nursing  []Case Management  DVT Prophylaxis:  []Lovenox  []Hep SQ  [x]SCDs  []Coumadin   []On Heparin gtt    Patient Active Problem List   Diagnosis Code    Nonischemic cardiomyopathy (HCC) I42.8    History of complete heart block Z86.79    Biventricular implantable cardioverter-defibrillator in situ Z95.810    Left bundle branch block (LBBB) on electrocardiogram I44.7    Type 2 diabetes mellitus with diabetic neuropathy (HCC) E11.40    Dyslipidemia E78.5    Diabetic neuropathy associated with type 2 diabetes mellitus ([facility] [address] E11.40    Obstructive sleep apnea on CPAP G47.33, Z99.89    AICD generator infection ([facility] [address] T82. 7XXA    Difficult airway for intubation T88. 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 [address] E66.9    History of pyelonephritis Z87.440    Iliopsoas muscle hematoma S70.10XA    Psoas hematoma, right, secondary to anticoagulant therapy S30. 1XXA    Impaired mobility and ADLs Z74.09    History of Coumadin therapy Z79.01    Gout M10.9    Acute paraplegia ([facility]) G82.20    Sepsis ([facility]) A41.9       Objective:   VS:   Visit Vitals    /50    Pulse 80    Temp 97.8 °F (36.6 °C)    Resp 20    Wt 116.4 kg (256 lb 9.9 oz)    SpO2 100%    BMI 40.19 kg/m2      Tmax/24hrs: Temp (24hrs), Av.8 °F (36.6 °C), Min:97.6 °F (36.4 °C), Max:98.3 °F (36.8 °C)  IOBRIEF    Intake/Output Summary (Last 24 hours) at 17 1159  Last data filed at 17 0800   Gross per 24 hour   Intake               10 ml   Output             1250 ml   Net            -1240 ml       General:  Ill appearing   HEENT:  NC, Atraumatic. PERRLA, anicteric sclerae. Pulmonary:  CTA Bilaterally. No Wheezing/Rhonchi/Rales. Cardiovascular: Regular rate and Rhythm. GI:  Soft, Non distended, Non tender. + Bowel sounds. Extremities:  No edema, cyanosis, clubbing. No calf tenderness. Psych:  Not anxious or agitated. Neurologic: Grossly - Motor and Sensory functions are intact .  No Anxiety , calm , no Agitation         Medications:   Current Facility-Administered Medications   Medication Dose Route Frequency    cefTRIAXone (ROCEPHIN) 2 g in 0.9% sodium chloride (MBP/ADV) 50 mL MBP  2 g IntraVENous Q24H    insulin lispro (HUMALOG) injection 5 Units  5 Units SubCUTAneous TIDAC    insulin glargine (LANTUS) injection 20 Units  20 Units SubCUTAneous DAILY    diphenhydrAMINE (BENADRYL) capsule 50 mg  50 mg Oral Q4H PRN    polyethylene glycol (MIRALAX) packet 17 g  17 g Oral TID    insulin glargine (LANTUS) injection 20 Units  20 Units SubCUTAneous QHS    cholecalciferol (VITAMIN D3) tablet 2,000 Units  2,000 Units Oral DAILY    docusate sodium (COLACE) capsule 100 mg  100 mg Oral DAILY AFTER BREAKFAST    gabapentin (NEURONTIN) capsule 400 mg  400 mg Oral BID    oxyCODONE-acetaminophen (PERCOCET 7.5) 7.5-325 mg per tablet 1 Tab  1 Tab Oral Q4H PRN    pravastatin (PRAVACHOL) tablet 40 mg  40 mg Oral QHS    senna-docusate (PERICOLACE) 8.6-50 mg per tablet 2 Tab  2 Tab Oral PCD    acetaminophen (TYLENOL) tablet 500 mg  500 mg Oral Q6H PRN    allopurinol (ZYLOPRIM) tablet 100 mg  100 mg Oral DAILY    clotrimazole (MYCELEX) frances 10 mg  10 mg Oral 5XD    folic acid (FOLVITE) tablet 1 mg  1 mg Oral DAILY    ondansetron (ZOFRAN) injection 4 mg  4 mg IntraVENous Q8H PRN    insulin lispro (HUMALOG) injection   SubCUTAneous Q6H    glucose chewable tablet 16 g  16 g Oral PRN    glucagon (GLUCAGEN) injection 1 mg  1 mg IntraMUSCular PRN    dextrose (D50W) injection syrg 12.5-25 g  25-50 mL IntraVENous PRN    pantoprazole (PROTONIX) 40 mg in sodium chloride 0.9 % 10 mL injection  40 mg IntraVENous DAILY       Labs:    Recent Results (from the past 24 hour(s))   GLUCOSE, POC    Collection Time: 04/23/17 12:39 PM   Result Value Ref Range    Glucose (POC) 269 (H) 70 - 110 mg/dL   GLUCOSE, POC    Collection Time: 04/23/17  5:42 PM   Result Value Ref Range    Glucose (POC) 271 (H) 70 - 110 mg/dL   GLUCOSE, POC    Collection Time: 04/23/17 11:22 PM   Result Value Ref Range    Glucose (POC) 324 (H) 70 - 110 mg/dL   CBC W/O DIFF    Collection Time: 04/24/17  4:00 AM   Result Value Ref Range    WBC 13.1 4.6 - 13.2 K/uL    RBC 3.08 (L) 4.20 - 5.30 M/uL    HGB 7.9 (L) 12.0 - 16.0 g/dL    HCT 23.4 (L) 35.0 - 45.0 %    MCV 76.0 74.0 - 97.0 FL    MCH 25.6 24.0 - 34.0 PG    MCHC 33.8 31.0 - 37.0 g/dL    RDW 15.0 (H) 11.6 - 14.5 %    PLATELET 840 189 - 199 K/uL    MPV 9.7 9.2 - 90.5 FL   METABOLIC PANEL, BASIC    Collection Time: 04/24/17  4:00 AM   Result Value Ref Range    Sodium 139 136 - 145 mmol/L    Potassium 4.5 3.5 - 5.5 mmol/L    Chloride 106 100 - 108 mmol/L    CO2 29 21 - 32 mmol/L    Anion gap 4 3.0 - 18 mmol/L    Glucose 239 (H) 74 - 99 mg/dL    BUN 50 (H) 7.0 - 18 MG/DL    Creatinine 1.29 0.6 - 1.3 MG/DL    BUN/Creatinine ratio 39 (H) 12 - 20      GFR est AA 50 (L) >60 ml/min/1.73m2    GFR est non-AA 41 (L) >60 ml/min/1.73m2    Calcium 8.5 8.5 - 10.1 MG/DL   GLUCOSE, POC    Collection Time: 04/24/17  6:16 AM   Result Value Ref Range    Glucose (POC) 227 (H) 70 - 110 mg/dL       Signed By: Dara Garcia MD     April 24, 2017

## 2017-04-24 NOTE — PROGRESS NOTES
Subjectively unchanged  Bowel mvt. Yesterday  [place] in place    Temp: 97.8 °F (36.6 °C) (04/24/17 0800) Pulse (Heart Rate): 80 (04/24/17 1000) Resp Rate: 20 (04/24/17 1000) BP: 109/50 (04/24/17 1000) O2 Sat (%): 100 % (04/24/17 1000) Weight: 116.4 kg (256 lb 9.9 oz) (04/24/17 0800)     Some volitional mvt of toes. No sensory level in spine. 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 Interval improvement in exam    Supportive care from a Neurology perspective. Pay close attention to bowel function as can become an issue.

## 2017-04-24 NOTE — PROGRESS NOTES
Bedside and Verbal shift change report given to 17[address] Lashaun Sharpe (oncoming nurse) by Catina Mccauley RN   (offgoing nurse). Report included the following information SBAR, MAR and Cardiac Rhythm . Yair Fernández

## 2017-04-24 NOTE — DIABETES MGMT
GLYCEMIC CONTROL PLAN OF CARE    Assessment/Recommendations:  Noted blood glucose elevated above targets.  Lantus increased to 20 unit BID and prandial lispro insulin ordered, 5 units TID before meals  Continue inpatient monitoring and intervention    Most recent blood glucose values:  4/23/2017 06:52 4/23/2017 12:39 4/23/2017 17:42 4/23/2017 23:22 4/24/2017 06:16   270 (H) 269 (H) 271 (H) 324 (H) 227 (H)     Current A1C of 8.0% is equivalent to average blood glucose of 183 mg/dl over the past 2-3 months.     Current hospital diabetes medications:   Lantus insulin 20 units daily and 20 units nightly  Prandial Lispro insulin 5 units TID before meals  Correctional Lispro insulin every 6 hours (very insulin resistant scale)     Previous day's insulin requirements:   Lantus insulin 30 units  Lispro insulin 39 units     Home diabetes medications:  Januvia  Lantus insulin 15 units nightly   Novolog insulin per sliding scale     Diet: Diabetic Consistent Carbohydrate    Education:  _x___Refer to Diabetes Education Record (3/31/17)            ____Education not indicated at this time      Lindsay Ortega RD, CDE

## 2017-04-24 NOTE — PROGRESS NOTES
Infectious Disease Progress Note    Requested by: Dr. Tricia Lopez, dr. Del Diaz    Reason: sepsis, acute paraplegia    Current abx Prior abx   vancomycin since 4/20  Gentamicin since 4/21  Metronidazole since 4/20 Cefepime 4/20-4/21     Lines:       Assessment :    77 y.o., right handed female, with an established history of hypertension, complete heart block with permanent pacemaker placed, diabetes mellitus, diabetic peripheral neuropathy, obesity, admitted to [facility] on 4/20/17. Clinical presentation consistent with  Sepsis (POA)  secondary to group B streptococcus bloodstream infection (positive blood cultures 4/20, negative blood cultures 4/21). Most likely source of bloodstream infection is infected right psoas hematoma/abscess. S/p ct guided drainage of hematoma on 4/20 with findings of 350 cc of pus - cultures gpc in pairs/chains  Paraplegia since 4/20 likely due to spinal cord infarct/septic thrombophlebitis. No signs of neurological recovery on today's exam     2 week h/o pain at the site of abdominal pacemaker, tenderness at the site likely suggest pacemaker infection. Patient doesn't have erythema at the site of pacemaker likely since it is in deeper tissue. Quick clearance of bacteremia would argue against endocarditis/endovascular infection. Clinically better. Recommendations:    1. D/c vancomycin, gentamicin, metronidazole  2. Start ceftriaxone  3. Obtain abdominal ultrasound to look for abscess at the site of abdominal pacemaker pocket  4. Will d/w cardiology about I&D of pacemaker pocket and pulse generator change    Above plan was discussed in details with cc team. . Please call me if any further questions or concerns. Will continue to participate in the care of this patient. subjective:    Feels better. Denies cp, sob. Unable to move legs. decreased pain at the site of pacemaker left abdomen. No nausea, vomiting. No new rash/itching/joint pain/back pain.        Home Medication List    Details gabapentin (NEURONTIN) 400 mg capsule Take 1 Cap by mouth two (2) times a day. Indications: NEUROPATHIC PAIN  Qty: 30 Cap, Refills: 0    Associated Diagnoses: Iliopsoas muscle hematoma, right, subsequent encounter      cholecalciferol (VITAMIN D3) 1,000 unit tablet Take 2 Tabs by mouth daily. Indications: PREVENTION OF VITAMIN D DEFICIENCY  Qty: 30 Tab, Refills: 0      SITagliptin (JANUVIA) 50 mg tablet Take 50 mg by mouth daily. Indications: type 2 diabetes mellitus      potassium chloride (KLOR-CON M20) 20 mEq tablet Take 20 mEq by mouth two (2) times a day. Indications: HYPOKALEMIA PREVENTION      ondansetron (ZOFRAN ODT) 4 mg disintegrating tablet Take 4 mg by mouth every eight (8) hours as needed for Nausea. cyclobenzaprine (FLEXERIL) 10 mg tablet Take 10 mg by mouth as needed for Muscle Spasm(s). Indications: MUSCLE SPASM      carvedilol (COREG) 6.25 mg tablet Take 1 Tab by mouth two (2) times daily (with meals). Indications: hypertension  Qty: 30 Tab, Refills: 0    Associated Diagnoses: Benign hypertensive heart disease with systolic CHF, NYHA class 2 ([facility])      acetaminophen (TYLENOL) 325 mg tablet Take 2 Tabs by mouth every four (4) hours as needed (for fever or pain level less than 5/10). Indications: Fever, Pain  Qty: 30 Tab, Refills: 0    Associated Diagnoses: Iliopsoas muscle hematoma, right, subsequent encounter      allopurinol (ZYLOPRIM) 100 mg tablet Take 1 Tab by mouth daily. Indications: GOUT  Qty: 15 Tab, Refills: 0    Associated Diagnoses: Chronic gout, unspecified cause, unspecified site      insulin glargine (LANTUS) 100 unit/mL injection 15 Units by SubCUTAneous route nightly. Indications: type 2 diabetes mellitus  Qty: 1 Vial, Refills: 0    Associated Diagnoses: Type 2 diabetes mellitus with diabetic neuropathy, with long-term current use of insulin ([facility])      docusate sodium (COLACE) 100 mg capsule Take 1 Cap by mouth daily (after breakfast).  Indications: Constipation  Qty: 15 Cap, Refills: 0      senna-docusate (PERICOLACE) 8.6-50 mg per tablet Take 2 Tabs by mouth daily (after dinner). Indications: Constipation  Qty: 30 Tab, Refills: 0      oxyCODONE-acetaminophen (PERCOCET 7.5) 7.5-325 mg per tablet Take 1 Tab by mouth every four (4) hours as needed (for pain level greater than 4/10). Max Daily Amount: 6 Tabs. Indications: Pain  Qty: 50 Tab, Refills: 0    Associated Diagnoses: Iliopsoas muscle hematoma, right, subsequent encounter      bumetanide (BUMEX) 1 mg tablet TAKE 1 TABLET TWICE A DAY  Qty: 180 Tab, Refills: 1      pravastatin (PRAVACHOL) 40 mg tablet Take 40 mg by mouth nightly. Indications: DYSLIPIDEMIA      ferrous sulfate 325 mg (65 mg iron) tablet Take 1 Tab by mouth two (2) times daily (with meals).   Qty: 30 Tab, Refills: 0      insulin aspart (NOVOLOG) 100 unit/mL injection INITIATE INSULIN CORRECTIVE PROTOCOL (HR): Normal Insulin Sensitivity  For Blood Sugar (mg/dL) of:    Less than 150 =   0 units          150 -199 =   2 units 200 -249 =   4 units 250 -299 =   6 units 300 -349 =   8 units 350 and above =   10 units If 2 glucose readings are above 200 mg/dL  Qty: 10 mL, Refills: 6             Current Facility-Administered Medications   Medication Dose Route Frequency    Gentamicin trough level due 4/24/17 at 0930  1 Each Other ONCE    Vancomycin random level due 4/24/17 at 1300  1 Each Other ONCE    insulin glargine (LANTUS) injection 20 Units  20 Units SubCUTAneous DAILY    diphenhydrAMINE (BENADRYL) capsule 50 mg  50 mg Oral Q4H PRN    polyethylene glycol (MIRALAX) packet 17 g  17 g Oral TID    insulin glargine (LANTUS) injection 20 Units  20 Units SubCUTAneous QHS    cholecalciferol (VITAMIN D3) tablet 2,000 Units  2,000 Units Oral DAILY    docusate sodium (COLACE) capsule 100 mg  100 mg Oral DAILY AFTER BREAKFAST    gabapentin (NEURONTIN) capsule 400 mg  400 mg Oral BID    oxyCODONE-acetaminophen (PERCOCET 7.5) 7.5-325 mg per tablet 1 Tab  1 Tab Oral Q4H PRN    pravastatin (PRAVACHOL) tablet 40 mg  40 mg Oral QHS    senna-docusate (PERICOLACE) 8.6-50 mg per tablet 2 Tab  2 Tab Oral PCD    acetaminophen (TYLENOL) tablet 500 mg  500 mg Oral Q6H PRN    allopurinol (ZYLOPRIM) tablet 100 mg  100 mg Oral DAILY    clotrimazole (MYCELEX) frances 10 mg  10 mg Oral 5XD    folic acid (FOLVITE) tablet 1 mg  1 mg Oral DAILY    ondansetron (ZOFRAN) injection 4 mg  4 mg IntraVENous Q8H PRN    VANCOMYCIN INFORMATION NOTE   Other CONTINUOUS    insulin lispro (HUMALOG) injection   SubCUTAneous Q6H    glucose chewable tablet 16 g  16 g Oral PRN    glucagon (GLUCAGEN) injection 1 mg  1 mg IntraMUSCular PRN    dextrose (D50W) injection syrg 12.5-25 g  25-50 mL IntraVENous PRN    metroNIDAZOLE (FLAGYL) IVPB premix 500 mg  500 mg IntraVENous Q8H    pantoprazole (PROTONIX) 40 mg in sodium chloride 0.9 % 10 mL injection  40 mg IntraVENous DAILY       Allergies: Vancomycin; Ampicillin; Bactrim [sulfamethoxazole-trimethoprim]; Blueberry; Ciprofloxacin; Codeine; Crestor [rosuvastatin]; Darvocet a500 [propoxyphene n-acetaminophen]; Demerol [meperidine]; Levaquin [levofloxacin]; Lipitor [atorvastatin]; Magnesium oxide; Minocin [minocycline]; Pcn [penicillins]; Pravachol [pravastatin]; Sulfa (sulfonamide antibiotics); Ultracet [tramadol-acetaminophen]; Vicodin [hydrocodone-acetaminophen]; Vytorin 10-10 [ezetimibe-simvastatin]; and Percodan [oxycodone hcl-oxycodone-asa]    Temp (24hrs), Av °F (36.7 °C), Min:97.6 °F (36.4 °C), Max:98.3 °F (36.8 °C)    Visit Vitals    /58    Pulse 79    Temp 97.6 °F (36.4 °C)    Resp 22    Wt 109 kg (240 lb 4.8 oz)    SpO2 99%    BMI 37.64 kg/m2       ROS: patient unable to communicate fluently. Detailed ros not feasible.     Physical Exam:    General: Well developed, well nourished female laying on the bed AAOx3 NAD    General:   awake alert and oriented   HEENT:  Normocephalic, atraumatic, PERRL, EOMI, no scleral icterus or pallor; no conjunctival hemmohage;  nasal and oral mucous are moist and without evidence of lesions. Neck supple, no bruits. Lymph Nodes:   no cervical, axillary or inguinal adenopathy   Lungs:   non-labored, bilaterally clear to auscultation- no crackles wheezes rales or rhonchi   Heart:   s1 and s2 irregular; no rubs or gallops, no edema, + pedal pulses   Abdomen:  soft, non-distended, active bowel sounds, no hepatomegaly, no splenomegaly. Decreased tenderness over the left abdominal pacemaker, no overlying erythema or fluctuance   Genitourinary:  deferred   Extremities:   no clubbing, cyanosis; no joint effusions or swelling; muscle mass appropriate for age   Neurologic:  No gross focal sensory abnormalities; 5/5 muscle strength to upper extremities. 0/5 strength in lower extremities.   Cranial nerves intact                        Skin:  Surgical scars abdomen, left neck well healed   Back:  minimal lumbar spine tenderness around L5, no paraspinal muscle guarding or rigidity, no CVA tenderness     Psychiatric:  No suicidal or homicidal ideations, appropriate mood and affect         Labs: Results:   Chemistry Recent Labs      04/24/17   0400  04/23/17   0832  04/23/17   0650  04/22/17   0330   GLU  239*  254*   --   225*   NA  139  139   --   135*   K  4.5  4.5   --   4.0   CL  106  105   --   101   CO2  29  25   --   26   BUN  50*  49*   --   40*   CREA  1.29  1.37*  1.44*  1.57*   CA  8.5  8.5   --   8.2*   AGAP  4  9   --   8   BUCR  39*  36*   --   25*      CBC w/Diff Recent Labs      04/24/17   0400  04/23/17   0832   WBC  13.1  16.6*   RBC  3.08*  3.21*   HGB  7.9*  8.4*   HCT  23.4*  24.4*   PLT  195  205      Microbiology Recent Labs      04/23/17   0830  04/23/17   0824   CULT  NO GROWTH AFTER 20 HOURS  NO GROWTH AFTER 20 HOURS          RADIOLOGY:    All available imaging studies/reports in [facility] for this admission were reviewed    Dr. Lucita Wick, Infectious Disease Specialist  [phone]  April 24, 2017  3:49 PM

## 2017-04-24 NOTE — PROGRESS NOTES
Matilde spoke with Nj who states that she notified pt's insurance on 4/20 of pt's dc from ARU and acute admission to [facility]. Matilde will remain available to assist if insurance has questions regarding pt's stay in the ARU from 4/6-4/20.

## 2017-04-24 NOTE — PROGRESS NOTES
Problem: Mobility Impaired (Adult and Pediatric)  Goal: *Acute Goals and Plan of Care (Insert Text)  STGs to be addressed within 3 days:  1. Bed mobility: Supine to sit to supine CGA/SBA with HR for meals. 2. Activity tolerance: Tolerate EOB > 15 minutes for ADLs. 3. Transfers: SPT-->to chair max/mod A with LRAD for ADLs. LTGs to be addressed within 7 days:  1. Transfers: SB-->to chair/wc max/mod A for ADLs. 2. Activity tolerance: Tolerated > 1 hr in chair for change of position. 3. Patient Education: Independent with HEP for home safety. 4. Stairs: Up/Down 2 steps CGA with HR for home entry. Outcome: Progressing Towards Goal  PHYSICAL THERAPY EVALUATION     Patient: Nolan Goss (46 y.o. female)  Date: 4/24/2017  Primary Diagnosis: Acute paraplegia  Acute paraplegia [facility])  Precautions:   Fall, Skin      ASSESSMENT :  Based on the objective data described below, the patient presents to PT with decreased functional mobility with regard to bed mobility, transfers, gait, and overall tolerance for activity. Patient reports that she was admitted from rehab, had been ambulatory with RW prior to this admission. Patient reported in c/o pain in R LE, is s/p R psoas hematoma drainage with catheter placement, with c/o inability to mobilize B LE. Today, patient received semi-reclined in bed. Patient reports absent sensation from knee to toes on R LE, intermittent sensation on L LE. Trace movement noted in B halluces. Patient able to complete long sitting in bed with use of B UE for support, complete dynamic balance activities while long sitting with alternating UE support and core stability. Patient with good return demonstration. Educated patient on importance of pressure relief activities. Patient would benefit from PT to address above impairments and assist with discharge planning.   Patient would strongly benefit from a rehab that specializes in spinal cord rehabilitation for follow-up therapy at discharge. Patient will benefit from skilled intervention to address the above impairments. Patients rehabilitation potential is considered to be Good  Factors which may influence rehabilitation potential include:   [ ]         None noted  [ ]         Mental ability/status  [X]         Medical condition  [ ]         Home/family situation and support systems  [ ]         Safety awareness  [ ]         Pain tolerance/management  [ ]         Other:        PLAN :  Recommendations and Planned Interventions:  [X]           Bed Mobility Training             [X]    Neuromuscular Re-Education  [X]           Transfer Training                   [ ]    Orthotic/Prosthetic Training  [X]           Gait Training                          [ ]    Modalities  [X]           Therapeutic Exercises          [ ]    Edema Management/Control  [X]           Therapeutic Activities            [X]    Patient and Family Training/Education  [ ]           Other (comment):     Frequency/Duration: Patient will be followed by physical therapy daily x 4-7 x week to address goals. Discharge Recommendations: Inpatient Rehab-spinal cord rehabilitation unit  Further Equipment Recommendations for Discharge: To be determined       SUBJECTIVE:   Patient stated I think I am doing okay.       OBJECTIVE DATA SUMMARY:       Past Medical History:   Diagnosis Date    Benign hypertensive heart disease with systolic CHF, NYHA class 2 ([facility] [address] 9/5/2012    Biventricular implantable cardioverter-defibrillator in situ 04/28/2005     Upgraded to BiV AICD; gen change 4/2008; pocket revision 10/2009; Abdominal - done on 8/22/2012 by Dr. Lara Better Cardiac cath 08/15/1996     Patent coronaries. Elev LVEDP. EF 50-55%.  Cardiac echocardiogram 06/23/2015     Ltd study. EF 45-50%. Mild, diffuse hypk. Severe apical hypk.   No mass or thrombus was clearly identified, although imaging was suboptimal.      Cardiac nuclear imaging test 06/19/2015     Fixed distal apical, distal septal defect more likely due to RV pacing than prior infarct. No ischemia. EF 46%. RWMA c/w RV pacing. Nondiagnostic EKG on pharm stress test.      Cardiovascular lower extremity venous duplex 09/04/2012     Acute, non-occlusive DVT in CFV on right. No DVT on left. No superficial thrombosis bilaterally.  Cardiovascular upper extremity venous duplex 08/27/2012     DVT in axillary vein on left. Left subclavian was not visualized.     Chronic anemia 9/5/2012    Chronic systolic heart failure (HCC)      Decreased calculated glomerular filtration rate (GFR) 3/30/2017     Calculated GFR equivalent to that of CKD stage 3 = 30-59 ml/min    Diabetic neuropathy associated with type 2 diabetes mellitus ([address] 6/28/2011    Difficult airway for intubation 08/22/2012     see anesthesia airway note    Dyslipidemia 6/28/2011    Gout      History of complete heart block 6/28/2011    History of Coumadin therapy       Anticoagulation for DVT of the LUE; Discontinued on 3/30/2017    History of deep venous thrombosis 9/5/2012     Left upper extremity    History of pyelonephritis 3/30/2017    Left bundle branch block (LBBB) on electrocardiogram 6/28/2011    Nonischemic cardiomyopathy ([address] 6/28/2011    Obesity (BMI 35.0-39.9 without comorbidity) ([address] 3/13/2017    Obstructive sleep apnea on CPAP 2/7/2012    Psoas hematoma, right, secondary to anticoagulant therapy 3/30/2017    Type 2 diabetes mellitus with diabetic neuropathy ([address] 6/28/2011     Past Surgical History:   Procedure Laterality Date    HX CARPAL TUNNEL RELEASE   4/07     right     HX CHOLECYSTECTOMY   1994    HX HYSTERECTOMY   1973    HX OTHER SURGICAL   6/11/2012     AICD revision    HX PACEMAKER   4/28/2005     [employer] AICD     Barriers to Learning/Limitations: None  Compensate with: N/A  Prior Level of Function/Home Situation:   Home Situation  Home Environment: Rehabilitation facility  One/Two Story Residence: [facility] story  Living Alone: No  Support Systems: Child(lea), Family member(s)  Patient Expects to be Discharged to[de-identified] Rehabilitation facility  Current DME Used/Available at Home: Walker, rolling  Critical Behavior:  Neurologic State: Alert; Appropriate for age  Psychosocial  Patient Behaviors: Calm; Cooperative  Family  Behaviors: Appropriate for situation;Calm; Cooperative;Supportive  Purposeful Interaction: Yes  Pt Identified Daily Priority: Clinical issues (comment)  Caritas Process: Establish trust;Attend basic human needs  Caring Interventions: Reassure; Therapeutic modalities  Reassure: Therapeutic listening;Caring rounds  Therapeutic Modalities: Intentional therapeutic touch  Strength:    Strength: Grossly decreased, non-functional (1/5 B halluses, 0/5 remainder of B LE)  Tone & Sensation:   Tone: Abnormal (B LE hypotonic)  Sensation: Impaired (B LE paresthesia)   Range Of Motion:  AROM: Grossly decreased, non-functional (B LE)  Functional Mobility:  Bed Mobility:  Rolling: Moderate assistance;Maximum assistance; Additional time (assist for B LE )  Supine to Sit: Minimum assistance (long sitting in bed)  Sit to Supine: Minimum assistance; Additional time (from long sit position in bed)  Scooting: Maximum assistance  Transfers:  Sit to Stand:  (N/A)  Balance:   Sitting: Impaired; With support (with B UE support)  Sitting - Static: Fair (occasional) (long sitting)  Sitting - Dynamic: Fair (occasional) (long sitting)  Standing:  (N/A)  Ambulation/Gait Training:  Ambulation - Level of Assistance:  (N/A)  Pain:  Pain Scale 1: Numeric (0 - 10)  Pain Intensity 1: 0  Activity Tolerance:   Good  Please refer to the flowsheet for vital signs taken during this treatment.   After treatment:   [ ] Patient left in no apparent distress sitting up in chair  [ ] Patient left sitting on EOB  [X] Patient left in no apparent distress in bed  [ ] Patient declined to be OOB at this time due to   [X] Call bell left within reach  [X] Nursing notified(DANIEL Gutierrez)  [X] Caregiver present  [ ] Bed alarm activated      COMMUNICATION/EDUCATION:   [X]         Fall prevention education was provided and the patient/caregiver indicated understanding. [X]         Patient/family have participated as able in goal setting and plan of care. [X]         Patient/family agree to work toward stated goals and plan of care. [ ]         Patient understands intent and goals of therapy, but is neutral about his/her participation. [ ]         Patient is unable to participate in goal setting and plan of care. Thank you for this referral.  Danika Pérez, PT   Time Calculation: 28 mins      G-codes:  Mobility  Current  CM= 80-99%   Goal  CJ= 20-39%. The severity rating is based on the Level of Assistance required for Functional Mobility and ADLs.      Eval Complexity: History: HIGH Complexity :3+ comorbidities / personal factors will impact the outcome/ POC Exam:HIGH Complexity : 4+ Standardized tests and measures addressing body structure, function, activity limitation and / or participation in recreation  Presentation: MEDIUM Complexity : Evolving with changing characteristics  Overall Complexity:MEDIUM

## 2017-04-24 NOTE — PROGRESS NOTES
vss afeb  Wbc 13k  hct 23  Culture- strep in psoas and blood    PE no sensation slight flicker of motion on right toe, no relfex    AP    Numbness more dense. Variable flicker of motor right great toe    AP    Near complete paraplegia low thoracic level. Etiology likely vascular, ischemic secondary to sepsis. Sepsis resolved rapidly with  drainage of psoas. B strep in psoas and blood. No surgical intervention for spine indicated. Recommend evaluation for spinal cord rehab. Mobilize as tolerated.   Begin PT OT

## 2017-04-25 LAB
BACTERIA SPEC CULT: NORMAL
GLUCOSE BLD STRIP.AUTO-MCNC: 125 MG/DL (ref 70–110)
GLUCOSE BLD STRIP.AUTO-MCNC: 142 MG/DL (ref 70–110)
GLUCOSE BLD STRIP.AUTO-MCNC: 170 MG/DL (ref 70–110)
GLUCOSE BLD STRIP.AUTO-MCNC: 175 MG/DL (ref 70–110)
SERVICE CMNT-IMP: NORMAL

## 2017-04-25 PROCEDURE — 74011250637 HC RX REV CODE- 250/637: Performed by: INTERNAL MEDICINE

## 2017-04-25 PROCEDURE — A6209 FOAM DRSG <=16 SQ IN W/O BDR: HCPCS

## 2017-04-25 PROCEDURE — 74011250636 HC RX REV CODE- 250/636: Performed by: INTERNAL MEDICINE

## 2017-04-25 PROCEDURE — 97110 THERAPEUTIC EXERCISES: CPT

## 2017-04-25 PROCEDURE — 74011250637 HC RX REV CODE- 250/637: Performed by: HOSPITALIST

## 2017-04-25 PROCEDURE — 74011000250 HC RX REV CODE- 250: Performed by: INTERNAL MEDICINE

## 2017-04-25 PROCEDURE — 74011636637 HC RX REV CODE- 636/637: Performed by: INTERNAL MEDICINE

## 2017-04-25 PROCEDURE — C9113 INJ PANTOPRAZOLE SODIUM, VIA: HCPCS | Performed by: INTERNAL MEDICINE

## 2017-04-25 PROCEDURE — 82962 GLUCOSE BLOOD TEST: CPT

## 2017-04-25 PROCEDURE — 74011000258 HC RX REV CODE- 258: Performed by: INTERNAL MEDICINE

## 2017-04-25 PROCEDURE — 65660000000 HC RM CCU STEPDOWN

## 2017-04-25 RX ADMIN — POLYETHYLENE GLYCOL 3350 17 G: 17 POWDER, FOR SOLUTION ORAL at 22:31

## 2017-04-25 RX ADMIN — GABAPENTIN 400 MG: 400 CAPSULE ORAL at 08:35

## 2017-04-25 RX ADMIN — PRAVASTATIN SODIUM 40 MG: 20 TABLET ORAL at 22:30

## 2017-04-25 RX ADMIN — DOCUSATE SODIUM 100 MG: 100 CAPSULE, LIQUID FILLED ORAL at 08:35

## 2017-04-25 RX ADMIN — SODIUM CHLORIDE 40 MG: 9 INJECTION INTRAMUSCULAR; INTRAVENOUS; SUBCUTANEOUS at 08:33

## 2017-04-25 RX ADMIN — INSULIN GLARGINE 20 UNITS: 100 INJECTION, SOLUTION SUBCUTANEOUS at 22:31

## 2017-04-25 RX ADMIN — CLOTRIMAZOLE 10 MG: 10 LOZENGE ORAL at 11:10

## 2017-04-25 RX ADMIN — CHOLECALCIFEROL TAB 25 MCG (1000 UNIT) 2000 UNITS: 25 TAB at 08:35

## 2017-04-25 RX ADMIN — ALLOPURINOL 100 MG: 100 TABLET ORAL at 08:35

## 2017-04-25 RX ADMIN — INSULIN LISPRO 3 UNITS: 100 INJECTION, SOLUTION INTRAVENOUS; SUBCUTANEOUS at 17:02

## 2017-04-25 RX ADMIN — OXYCODONE HYDROCHLORIDE AND ACETAMINOPHEN 1 TABLET: 7.5; 325 TABLET ORAL at 11:23

## 2017-04-25 RX ADMIN — INSULIN GLARGINE 20 UNITS: 100 INJECTION, SOLUTION SUBCUTANEOUS at 08:32

## 2017-04-25 RX ADMIN — OXYCODONE HYDROCHLORIDE AND ACETAMINOPHEN 1 TABLET: 7.5; 325 TABLET ORAL at 19:05

## 2017-04-25 RX ADMIN — CLOTRIMAZOLE 10 MG: 10 LOZENGE ORAL at 14:57

## 2017-04-25 RX ADMIN — POLYETHYLENE GLYCOL 3350 17 G: 17 POWDER, FOR SOLUTION ORAL at 08:38

## 2017-04-25 RX ADMIN — Medication 2 TABLET: at 17:00

## 2017-04-25 RX ADMIN — ACETAMINOPHEN 500 MG: 500 TABLET, COATED ORAL at 15:43

## 2017-04-25 RX ADMIN — INSULIN LISPRO 3 UNITS: 100 INJECTION, SOLUTION INTRAVENOUS; SUBCUTANEOUS at 11:07

## 2017-04-25 RX ADMIN — INSULIN LISPRO 5 UNITS: 100 INJECTION, SOLUTION INTRAVENOUS; SUBCUTANEOUS at 08:37

## 2017-04-25 RX ADMIN — INSULIN LISPRO 5 UNITS: 100 INJECTION, SOLUTION INTRAVENOUS; SUBCUTANEOUS at 11:08

## 2017-04-25 RX ADMIN — CEFTRIAXONE SODIUM 2 G: 2 INJECTION, POWDER, FOR SOLUTION INTRAMUSCULAR; INTRAVENOUS at 11:11

## 2017-04-25 RX ADMIN — CLOTRIMAZOLE 10 MG: 10 LOZENGE ORAL at 22:30

## 2017-04-25 RX ADMIN — CLOTRIMAZOLE 10 MG: 10 LOZENGE ORAL at 17:01

## 2017-04-25 RX ADMIN — ACETAMINOPHEN 500 MG: 500 TABLET, COATED ORAL at 07:22

## 2017-04-25 RX ADMIN — GABAPENTIN 400 MG: 400 CAPSULE ORAL at 17:01

## 2017-04-25 RX ADMIN — CLOTRIMAZOLE 10 MG: 10 LOZENGE ORAL at 08:29

## 2017-04-25 RX ADMIN — FOLIC ACID 1 MG: 1 TABLET ORAL at 08:35

## 2017-04-25 RX ADMIN — INSULIN LISPRO 5 UNITS: 100 INJECTION, SOLUTION INTRAVENOUS; SUBCUTANEOUS at 16:58

## 2017-04-25 NOTE — INTERDISCIPLINARY ROUNDS
CRITICAL CARE INTERDISCIPLINARY ROUNDS      Patient Information:    Name:   Solo Arora    Age:   77 y.o. Admission Date:   4/20/2017    Readmit Risk Assessment Information:      Readmit Risk Tool Support Systems: Child(lea), Family member(s)    Surgery Date:      Day of Stay:     Expected Discharge Date:        Attending Provider:   Dimple Gonsales MD    Surgeon:        Consultant:       Primary Care Provider:   Lety Davis DO    Problem List:     Patient Active Problem List   Diagnosis Code    Nonischemic cardiomyopathy ([facility] [address] I42.8    History of complete heart block Z86.79    Biventricular implantable cardioverter-defibrillator in situ Z95.810    Left bundle branch block (LBBB) on electrocardiogram I44.7    Type 2 diabetes mellitus with diabetic neuropathy ([facility] [address] E11.40    Dyslipidemia E78.5    Diabetic neuropathy associated with type 2 diabetes mellitus ([facility] [address] E11.40    Obstructive sleep apnea on CPAP G47.33, Z99.89    AICD generator infection ([facility] [address] T82. 7XXA    Difficult airway for intubation T88. 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 [address] E66.9    History of pyelonephritis Z87.440    Iliopsoas muscle hematoma S70.10XA    Psoas hematoma, right, secondary to anticoagulant therapy S30. 1XXA    Impaired mobility and ADLs Z74.09    History of Coumadin therapy Z79.01    Gout M10.9    Acute paraplegia (HCC) G82.20    Sepsis ([facility] [address] A41.9    Psoas abscess, right ([facility] [address] K68.12    Krueger catheter in place on admission Z96.0    Urinary tract infection due to Enterococcus N39.0, B95.2    Group B streptococcal infection A49.1 Principal Problem:  Acute paraplegia ([facility] [address]    Procedure:       During rounds the following quality care indicators and evidence based practices were addressed :     DVT Prophylaxis, Pressure Injury Prevention, Pain Management, Sepsis resuscitation and management, Nutritional Status, Critical Care Interventions Airways, Drains and Lines and IHI Bundles: Central Line Bundle Followed , Krueger Bundle Followed and Vent Bundle Followed, Vent Day 0           Acute MI/PCI:   Not applicable    Heart Failure:    Not applicable    Cardiac Surgery:  Not applicable    SCIP Measures for other Surgeries:   Not applicable    Pneumonia:    Appropriate Antibiotic Selection (ICU versus Non-ICU)    Stroke:  Patient's Personal Risk Factors for Stroke are:   hypertension, family history, hyperlipidemia or diabetes mellitus    NIH Stroke Score       Transfer Level of Care:  Ready for Transfer    The patient will require the following interventions based on the Readmission Risk Assessment:  Pharmacy evaluation and teaching, Care Management involvement for home health follow up for:  mobility and assistance with ADL's and Spiritual Care evaluation      Discharge Management:  Home    Anticipated Discharge Date:  3days      Interdisciplinary team rounds were held  with the following team membersCare Management, Diabetes Treatment Specialist, Nursing, Nutrition, Pastoral Care, Pharmacy, Physician and Clinical Coordinator and the  patient. Plan of care discussed. See clinical pathway and/or care plan for interventions and desired outcomes. Transfer to telemetry.

## 2017-04-25 NOTE — PROGRESS NOTES
Pt remains paraplegic  States she can move her toes, but this was not observed  Denies sensation with touch on toes  WBC has normalized to 13.1  Temp 100.9  Alert and responsive  Continues on ceftriaxone per ID  For PICC line in the am     Yesy Garsia NP

## 2017-04-25 NOTE — PROGRESS NOTES
[facility]  -  Progress Note      Patient: Alexia Hassan MRN: [id number]  SSN: [id number]    YOB: 1950  Age: 77 y.o. Sex: female      Admit Date: 4/20/2017    Hospital Problem List:     Hospital Problems  Date Reviewed: 4/20/2017          Codes Class Noted POA    * (Principal)Acute paraplegia ([facility] [address] ICD-10-CM: G82.20  ICD-9-CM: 344.1  4/20/2017 Yes        Sepsis ([facility] [address] ICD-10-CM: A41.9  ICD-9-CM: 038.9, 995.91  4/20/2017 Yes        Psoas abscess, right ([facility] [address] ICD-10-CM: J89.01  ICD-9-CM: 567.31  4/20/2017 Yes        Krueger catheter in place on admission ICD-10-CM: Z96.0  ICD-9-CM: V45.89  4/20/2017 Yes        Urinary tract infection due to Enterococcus ICD-10-CM: N39.0, B95.2  ICD-9-CM: 599.0, 041.04  4/20/2017 Yes        Group B streptococcal infection ICD-10-CM: A49.1  ICD-9-CM: 041.02  4/20/2017 Yes        History of Coumadin therapy ICD-10-CM: Z79.01  ICD-9-CM: V58.61  Unknown Yes    Overview Signed 4/6/2017 10:35 PM by Elisabeth Payne MD     Anticoagulation for chronic atrial fibrillation; Discontinued on 3/30/2017             Decreased calculated glomerular filtration rate (GFR) ICD-10-CM: R94.4  ICD-9-CM: 794.4  3/30/2017 Yes    Overview Signed 4/6/2017  5:47 PM by Elisabeth Payne MD     Calculated GFR equivalent to that of CKD stage 3 = 30-59 ml/min             Iliopsoas muscle hematoma ICD-10-CM: S70.10XA  ICD-9-CM: 924.00  3/30/2017 Yes        Psoas hematoma, right, secondary to anticoagulant therapy ICD-10-CM: S30. 1XXA  ICD-9-CM: 924.9, E934.2  3/30/2017 Yes        Impaired mobility and ADLs ICD-10-CM: Z74.09  ICD-9-CM: 799.89  3/30/2017 Yes        Benign hypertensive heart disease with systolic CHF, NYHA class 2 (HCC) (Chronic) ICD-10-CM: I11.0, I50.20  ICD-9-CM: 402.11, 428.20, 428.0  9/5/2012 Yes        AICD generator infection ([facility] [address] ICD-10-CM: T82. 7XXA  ICD-9-CM: 996.61  8/20/2012 Yes    Overview Signed 8/20/2012  6:13 PM by Hari Davies MD     S/p explant 4 leads 8/20/12             Type 2 diabetes mellitus with diabetic neuropathy (HCC) (Chronic) ICD-10-CM: E11.40  ICD-9-CM: 250.60, 357.2  6/28/2011 Yes            -Sepsis secondary to infected right psoas hematoma/epidural abscess with neurological compromise resulting in paraplegia. Now s/p drain by IR.  -Heart block s/p AICD 2005 with upgrade to BiV later that year, removed however in 2012 due to trauma and infection. New biventricular pacemaker LUQ 9/2012 by Dr. Chago Fabian and revision 10/2012 due to Twiddler's syndrome. Pacemaker dependent. Normal function 3/2017. Patient does report discomfort left upper quadrant following fall last month. Right axillary vein obstruction by venogram 2012.  -Pacemaker pocket pain, cannot exclude seeding from abscess. Limited options given device dependence and obstruction.   -Elevated troponin likely demand ischemia in setting of sepsis. -H/p transient nonischemic CMY with EF 45% (45-50% 6/2015 with patent coronaries on cath 1996). No ischemia on nuclear 6/2015.  -Acute kidney injury, improving  -Diabetes mellitus.  -Dyslipidemia. -H/o HTN. -LBBB. -HALI on CPAP. -H/o DVT. Plan:     -Pain to palpation of ICD pocket continues to improve. Pt endorses diffuse abdominal muscle pain from moving around this morning. Continue on abx.   -Paced rhythm on tele review.   -Abd U/S negative for fluid collection or focal abscess.   -Continued on statin. Subjective:     Resting comfortably. Endorses some mild diffuse muscular abdominal pain from getting washed up this morning.      Objective:      Patient Vitals for the past 8 hrs:   Temp Pulse Resp BP SpO2   04/25/17 0800 98.9 °F (37.2 °C) 81 17 123/60 97 %   04/25/17 0600 - 80 22 127/56 99 %   04/25/17 0400 - 83 24 125/49 94 %         Patient Vitals for the past 96 hrs:   Weight   04/25/17 0729 111.1 kg (244 lb 14.9 oz)   04/24/17 0800 116.4 kg (256 lb 9.9 oz)   04/23/17 0602 109 kg (240 lb 4.8 oz)   04/22/17 0542 109.4 kg (241 lb 2.9 oz) 04/21/17 1736 108.2 kg (238 lb 8.6 oz)         Intake/Output Summary (Last 24 hours) at 04/25/17 1003  Last data filed at 04/25/17 0600   Gross per 24 hour   Intake              720 ml   Output             2380 ml   Net            -1660 ml       Physical Exam:  General:  Awake, alert, oriented x3  Neck:  Supple, no jvd  Lungs:  Clear to auscultation bilat  Heart:  Reg rate and rhythm  Abdomen:  LUQ pacer pocket is intact, mildly tender to palpation, no erhythma or swelling  Extremities: no LE edema    Data Review:     Labs: Results:       Chemistry Recent Labs      04/24/17   0400  04/23/17   0832  04/23/17   0650   GLU  239*  254*   --    NA  139  139   --    K  4.5  4.5   --    CL  106  105   --    CO2  29  25   --    BUN  50*  49*   --    CREA  1.29  1.37*  1.44*   CA  8.5  8.5   --    AGAP  4  9   --    BUCR  39*  36*   --       CBC w/Diff Recent Labs      04/24/17   0400  04/23/17   0832   WBC  13.1  16.6*   RBC  3.08*  3.21*   HGB  7.9*  8.4*   HCT  23.4*  24.4*   PLT  195  205      Cardiac Enzymes No results found for: CPK, CKMMB, CKMB, RCK3, CKMBT, CKNDX, CKND1, CARLI, TROPT, TROIQ, AMBERLY, TROPT, TNIPOC, BNP, BNPP   Coagulation No results for input(s): PTP, INR, APTT in the last 72 hours. No lab exists for component: INREXT    Lipid Panel Lab Results   Component Value Date/Time    Cholesterol, total 154 07/24/2012 01:00 AM    HDL Cholesterol 48 07/24/2012 01:00 AM    LDL, calculated 90.4 07/24/2012 01:00 AM    VLDL, calculated 15.6 07/24/2012 01:00 AM    Triglyceride 78 07/24/2012 01:00 AM    CHOL/HDL Ratio 3.2 07/24/2012 01:00 AM      BNP Lab Results   Component Value Date/Time    B-type Natriuretic Peptide 111.9 11/19/2013 10:17 AM      Liver Enzymes No results for input(s): TP, ALB, TBIL, AP, SGOT, GPT in the last 72 hours.     No lab exists for component: DBIL   Digoxin    Thyroid Studies Lab Results   Component Value Date/Time    TSH 0.51 04/20/2017 07:26 PM            Signed By: LUDMILA Stanton April 25, 2017

## 2017-04-25 NOTE — PROGRESS NOTES
Infectious Disease Progress Note    Requested by: Dr. Cristal Babb, dr. Sarita Love    Reason: sepsis, acute paraplegia    Current abx Prior abx   Ceftriaxone since 4/24 Cefepime 4/20-4/21  vancomycin  4/20-4/24  Gentamicin 4/21-4/24  Metronidazole  4/20-4/24     Lines:       Assessment :    77 y.o., right handed female, with an established history of hypertension, complete heart block with permanent pacemaker placed, diabetes mellitus, diabetic peripheral neuropathy, obesity, admitted to [facility] on 4/20/17. Clinical presentation consistent with  Sepsis (POA)  secondary to group B streptococcus bloodstream infection (positive blood cultures 4/20, negative blood cultures 4/21). Most likely source of bloodstream infection is infected right psoas hematoma/abscess. S/p ct guided drainage of hematoma on 4/20 with findings of 350 cc of pus - cultures gpc in pairs/chains  Paraplegia since 4/20 likely due to spinal cord infarct/septic thrombophlebitis. No signs of neurological recovery on today's exam     2 week h/o pain at the site of abdominal pacemaker, tenderness at the site - however, patient doesn't have erythema at the site of pacemaker. No fluid collection noted at pacemaker pocket site per usg 4/24. Quick clearance of bacteremia would argue against endocarditis/endovascular infection. At this time risk of removal of pacemaker/general pocket change exceed the benefit. Discussed with cardiology. Would recommend close monitoring. Abdominal usg 4/24 doesn't reveal evidence of abscess/fluid collection at the site of pacemaker pocket. Clinically better. Recommendations:    1. cont ceftriaxone  2. Remove left femoral CVC  3. Will place new picc line in am for outpt iv abx  4. Monitor clinically    Above plan was discussed in details with patient, rn. . Please call me if any further questions or concerns. Will continue to participate in the care of this patient. subjective:    Feels better. Denies cp, sob.  Unable to move legs. decreased pain at the site of pacemaker left abdomen. No nausea, vomiting. No new rash/itching/joint pain/back pain. Home Medication List    Details   gabapentin (NEURONTIN) 400 mg capsule Take 1 Cap by mouth two (2) times a day. Indications: NEUROPATHIC PAIN  Qty: 30 Cap, Refills: 0    Associated Diagnoses: Iliopsoas muscle hematoma, right, subsequent encounter      cholecalciferol (VITAMIN D3) 1,000 unit tablet Take 2 Tabs by mouth daily. Indications: PREVENTION OF VITAMIN D DEFICIENCY  Qty: 30 Tab, Refills: 0      SITagliptin (JANUVIA) 50 mg tablet Take 50 mg by mouth daily. Indications: type 2 diabetes mellitus      potassium chloride (KLOR-CON M20) 20 mEq tablet Take 20 mEq by mouth two (2) times a day. Indications: HYPOKALEMIA PREVENTION      ondansetron (ZOFRAN ODT) 4 mg disintegrating tablet Take 4 mg by mouth every eight (8) hours as needed for Nausea. cyclobenzaprine (FLEXERIL) 10 mg tablet Take 10 mg by mouth as needed for Muscle Spasm(s). Indications: MUSCLE SPASM      carvedilol (COREG) 6.25 mg tablet Take 1 Tab by mouth two (2) times daily (with meals). Indications: hypertension  Qty: 30 Tab, Refills: 0    Associated Diagnoses: Benign hypertensive heart disease with systolic CHF, NYHA class 2 (HCC)      acetaminophen (TYLENOL) 325 mg tablet Take 2 Tabs by mouth every four (4) hours as needed (for fever or pain level less than 5/10). Indications: Fever, Pain  Qty: 30 Tab, Refills: 0    Associated Diagnoses: Iliopsoas muscle hematoma, right, subsequent encounter      allopurinol (ZYLOPRIM) 100 mg tablet Take 1 Tab by mouth daily. Indications: GOUT  Qty: 15 Tab, Refills: 0    Associated Diagnoses: Chronic gout, unspecified cause, unspecified site      insulin glargine (LANTUS) 100 unit/mL injection 15 Units by SubCUTAneous route nightly.  Indications: type 2 diabetes mellitus  Qty: 1 Vial, Refills: 0    Associated Diagnoses: Type 2 diabetes mellitus with diabetic neuropathy, with long-term current use of insulin (HCC)      docusate sodium (COLACE) 100 mg capsule Take 1 Cap by mouth daily (after breakfast). Indications: Constipation  Qty: 15 Cap, Refills: 0      senna-docusate (PERICOLACE) 8.6-50 mg per tablet Take 2 Tabs by mouth daily (after dinner). Indications: Constipation  Qty: 30 Tab, Refills: 0      oxyCODONE-acetaminophen (PERCOCET 7.5) 7.5-325 mg per tablet Take 1 Tab by mouth every four (4) hours as needed (for pain level greater than 4/10). Max Daily Amount: 6 Tabs. Indications: Pain  Qty: 50 Tab, Refills: 0    Associated Diagnoses: Iliopsoas muscle hematoma, right, subsequent encounter      bumetanide (BUMEX) 1 mg tablet TAKE 1 TABLET TWICE A DAY  Qty: 180 Tab, Refills: 1      pravastatin (PRAVACHOL) 40 mg tablet Take 40 mg by mouth nightly. Indications: DYSLIPIDEMIA      ferrous sulfate 325 mg (65 mg iron) tablet Take 1 Tab by mouth two (2) times daily (with meals).   Qty: 30 Tab, Refills: 0      insulin aspart (NOVOLOG) 100 unit/mL injection INITIATE INSULIN CORRECTIVE PROTOCOL (HR): Normal Insulin Sensitivity  For Blood Sugar (mg/dL) of:    Less than 150 =   0 units          150 -199 =   2 units 200 -249 =   4 units 250 -299 =   6 units 300 -349 =   8 units 350 and above =   10 units If 2 glucose readings are above 200 mg/dL  Qty: 10 mL, Refills: 6             Current Facility-Administered Medications   Medication Dose Route Frequency    cefTRIAXone (ROCEPHIN) 2 g in 0.9% sodium chloride (MBP/ADV) 50 mL MBP  2 g IntraVENous Q24H    insulin lispro (HUMALOG) injection 5 Units  5 Units SubCUTAneous TIDAC    insulin glargine (LANTUS) injection 20 Units  20 Units SubCUTAneous DAILY    diphenhydrAMINE (BENADRYL) capsule 50 mg  50 mg Oral Q4H PRN    polyethylene glycol (MIRALAX) packet 17 g  17 g Oral TID    insulin glargine (LANTUS) injection 20 Units  20 Units SubCUTAneous QHS    cholecalciferol (VITAMIN D3) tablet 2,000 Units  2,000 Units Oral DAILY    docusate sodium (COLACE) capsule 100 mg  100 mg Oral DAILY AFTER BREAKFAST    gabapentin (NEURONTIN) capsule 400 mg  400 mg Oral BID    oxyCODONE-acetaminophen (PERCOCET 7.5) 7.5-325 mg per tablet 1 Tab  1 Tab Oral Q4H PRN    pravastatin (PRAVACHOL) tablet 40 mg  40 mg Oral QHS    senna-docusate (PERICOLACE) 8.6-50 mg per tablet 2 Tab  2 Tab Oral PCD    acetaminophen (TYLENOL) tablet 500 mg  500 mg Oral Q6H PRN    allopurinol (ZYLOPRIM) tablet 100 mg  100 mg Oral DAILY    clotrimazole (MYCELEX) frances 10 mg  10 mg Oral 5XD    folic acid (FOLVITE) tablet 1 mg  1 mg Oral DAILY    ondansetron (ZOFRAN) injection 4 mg  4 mg IntraVENous Q8H PRN    insulin lispro (HUMALOG) injection   SubCUTAneous Q6H    glucose chewable tablet 16 g  16 g Oral PRN    glucagon (GLUCAGEN) injection 1 mg  1 mg IntraMUSCular PRN    dextrose (D50W) injection syrg 12.5-25 g  25-50 mL IntraVENous PRN    pantoprazole (PROTONIX) 40 mg in sodium chloride 0.9 % 10 mL injection  40 mg IntraVENous DAILY       Allergies: Vancomycin; Ampicillin; Bactrim [sulfamethoxazole-trimethoprim]; Blueberry; Ciprofloxacin; Codeine; Crestor [rosuvastatin]; Darvocet a500 [propoxyphene n-acetaminophen]; Demerol [meperidine]; Levaquin [levofloxacin]; Lipitor [atorvastatin]; Magnesium oxide; Minocin [minocycline]; Pcn [penicillins]; Pravachol [pravastatin]; Sulfa (sulfonamide antibiotics); Ultracet [tramadol-acetaminophen]; Vicodin [hydrocodone-acetaminophen]; Vytorin 10-10 [ezetimibe-simvastatin]; and Percodan [oxycodone hcl-oxycodone-asa]    Temp (24hrs), Av.6 °F (37 °C), Min:97.8 °F (36.6 °C), Max:100 °F (37.8 °C)    Visit Vitals    /60    Pulse 81    Temp 98.9 °F (37.2 °C)    Resp 17    Wt 111.1 kg (244 lb 14.9 oz)    SpO2 97%    BMI 38.36 kg/m2       ROS: patient unable to communicate fluently. Detailed ros not feasible.     Physical Exam:    General: Well developed, well nourished female laying on the bed AAOx3 NAD    General:   awake alert and oriented   HEENT:  Normocephalic, atraumatic, PERRL, EOMI, no scleral icterus or pallor; no conjunctival hemmohage;  nasal and oral mucous are moist and without evidence of lesions. Neck supple, no bruits. Lymph Nodes:   no cervical, axillary or inguinal adenopathy   Lungs:   non-labored, bilaterally clear to auscultation- no crackles wheezes rales or rhonchi   Heart:   s1 and s2 irregular; no rubs or gallops, no edema, + pedal pulses   Abdomen:  soft, non-distended, active bowel sounds, no hepatomegaly, no splenomegaly. Decreased tenderness over the left abdominal pacemaker, no overlying erythema or fluctuance   Genitourinary:  deferred   Extremities:   no clubbing, cyanosis; no joint effusions or swelling; muscle mass appropriate for age   Neurologic:  No gross focal sensory abnormalities; 5/5 muscle strength to upper extremities. 0/5 strength in lower extremities.   Cranial nerves intact                        Skin:  Surgical scars abdomen, left neck well healed   Back:  minimal lumbar spine tenderness around L5, no paraspinal muscle guarding or rigidity, no CVA tenderness     Psychiatric:  No suicidal or homicidal ideations, appropriate mood and affect         Labs: Results:   Chemistry Recent Labs      04/24/17   0400  04/23/17   0832  04/23/17   0650   GLU  239*  254*   --    NA  139  139   --    K  4.5  4.5   --    CL  106  105   --    CO2  29  25   --    BUN  50*  49*   --    CREA  1.29  1.37*  1.44*   CA  8.5  8.5   --    AGAP  4  9   --    BUCR  39*  36*   --       CBC w/Diff Recent Labs      04/24/17   0400  04/23/17   0832   WBC  13.1  16.6*   RBC  3.08*  3.21*   HGB  7.9*  8.4*   HCT  23.4*  24.4*   PLT  195  205      Microbiology Recent Labs      04/23/17   0830  04/23/17   0824   CULT  NO GROWTH 2 DAYS  NO GROWTH 2 DAYS          RADIOLOGY:    All available imaging studies/reports in [facility] for this admission were reviewed    Dr. Samantha Driver, Infectious Disease Specialist  [phone]  April 25, 2017  3:49 PM

## 2017-04-25 NOTE — PROGRESS NOTES
Problem: Mobility Impaired (Adult and Pediatric)  Goal: *Acute Goals and Plan of Care (Insert Text)  STGs to be addressed within 3 days:  1. Bed mobility: Supine to sit to supine CGA/SBA with HR for meals. 2. Activity tolerance: Tolerate EOB > 15 minutes for ADLs. 3. Transfers: SPT-->to chair max/mod A with LRAD for ADLs. LTGs to be addressed within 7 days:  1. Transfers: SB-->to chair/wc max/mod A for ADLs. 2. Activity tolerance: Tolerated > 1 hr in chair for change of position. 3. Patient Education: Independent with HEP for home safety. 4. Stairs: Up/Down 2 steps CGA with HR for home entry. Outcome: Progressing Towards Goal  PHYSICAL THERAPY TREATMENT     Patient: Pooja Benton (89 y.o. female)  Date: 4/25/2017  Diagnosis: Acute paraplegia  Acute paraplegia (HCC) Acute paraplegia [facility])  Precautions: Fall, Skin   Chart, physical therapy assessment, plan of care and goals were reviewed. ASSESSMENT:  Patient reported increased soreness in abdomen today, declined to attempt sitting up or on EOB, agreeable to completing therex in bed. Patient with trace movement of B hip IR, trace movement in R toes. Completed PROM of B LE:  AP,circles, HS, hip abd/add (1 set x 10 reps each with rest break ~ 20 seconds between exercises). Will continue to progress patient as tolerated. Progression toward goals:  [ ]      Improving appropriately and progressing toward goals  [X]      Improving slowly and progressing toward goals  [ ]      Not making progress toward goals and plan of care will be adjusted       PLAN:  Patient continues to benefit from skilled intervention to address the above impairments. Continue treatment per established plan of care. Discharge Recommendations:  Inpatient Rehab-spinal rehabilitation unit  Further Equipment Recommendations for Discharge: To be determined       SUBJECTIVE:   Patient stated I'm just really sore today.       OBJECTIVE DATA SUMMARY:   Critical Behavior:  Neurologic State: Alert, Appropriate for age  Orientation Level: Oriented X4  Cognition: Follows commands  Safety/Judgement: Fall prevention  Functional Mobility Training:  Bed Mobility:  Rolling:  (Pt declined )  Balance:  Sitting:  (N/A)  Standing:  (N/A)  Pain:  Pain Scale 1: Numeric (0 - 10)  Pain Intensity 1: 4  Pain Location 1: Generalized  Pain Intervention(s) 1: Medication (see MAR)  Activity Tolerance:   Fair  Please refer to the flowsheet for vital signs taken during this treatment.   After treatment:   [ ] Patient left in no apparent distress sitting up in chair  [X] Patient left in no apparent distress in bed  [X] Call bell left within reach  [ ] Nursing notified  [ ] Caregiver present  [ ] Bed alarm activated      Tanmay Varela, PT   Time Calculation: 23 mins

## 2017-04-25 NOTE — ROUTINE PROCESS
{[facility] BEDSIDE_VERBAL_RECORDED_WRITTEN:[id number]::\"Bedside\" shift change report given to  Shavonne Sampson (oncoming nurse) by Iftikhar Olson RN (offgoing nurse). Report included the following information  Kardex, past medical history, recent fall with hematoma to spinal column. Pt cont. Not to be able to move lower extremities and denies feeling in both legs when sensitivity testing performed.

## 2017-04-25 NOTE — ROUTINE PROCESS
Bedside and Verbal shift change report given to Danika Parisi RN (oncoming nurse) by CINTIA Kimble RN (offgoing nurse). Report included the following information SBAR, Kardex, Intake/Output, MAR and Recent Results.

## 2017-04-25 NOTE — PROGRESS NOTES
[facility]  Progress Note    Patient: Karen Mojica Age: 77 y.o. : 1950 MR#: [id number] SSN: [id number]  Date/Time: 2017     Subjective:     Feels better   Some sensation in left legs / knee pains she can feel   Weakness present both Lower limbs     Assessment/Plan:    77 yr old Female with PMH of iliopsoas hematoma , patient developed sudden onset of Right  leg weakness , patient evaluated by rehab MD and was transferred to medical service for further management . Dr Teo Friedman - spine surgery consulted , neurology consulted , patient is admitted to ICU    Patient has Pacemaker - ?  Source of infection     1 Right Leg weakness   - psoas hematoma , increased in size and got infected with severe neurological complications , s/p drainage by IR   - On antibiotics per ID   - PT/OT   - US of pacemaker site - no local abscess       2 CMO - LBBB, - followed by cardiology     3 DM2  - improving Blood  Glucose \  - Lowest was 88 , continue current dose of insulin     4 H/O DVT off  AC - due to Bleed     5 Elevated trop - From stress , S/B cardi - Continue current treatments     6 Acute blood loss anemia   - Monitor H/H , BT if h/h < 7     7 UTI- POA   - Continue current antibiotics   - Follow urine culture       Case discussed with:  [x]Patient  [x]Family  [x]Nursing  []Case Management  DVT Prophylaxis:  []Lovenox  []Hep SQ  [x]SCDs  []Coumadin   []On Heparin gtt    Patient Active Problem List   Diagnosis Code    [facility]ische[facility] I42.8    History of complete heart block Z86.79    Biventricular implantable cardioverter-defibrillator in situ Z95.810    Left bundle branch block (LBBB) on electrocardiogram I44.7    Type 2 diabetes mellitus with diabetic neuropathy (HCC) E11.40    Dyslipidemia E78.5    Diabetic neuropathy associated with type 2 diabetes mellitus ([facility] E11.40    Obstructive sleep apnea on CPAP G47.33, Z99.89    AICD generator infection ([facility] T82. 7XXA    Difficult airway for intubation T88. 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 1XXA    Impaired mobility and ADLs Z74.09    History of Coumadin therapy Z79.01    Gout M10.9    Acute paraplegia ([facility]) G82.20    Sepsis ([facility] A41.9    Psoas abscess, right ([facility] K68.12    Krueger catheter in place on admission Z96.0    Urinary tract infection due to Enterococcus N39.0, B95.2    Group B streptococcal infection A49.1       Objective:   VS:   Visit Vitals    /55    Pulse 81    Temp 98.9 °F (37.2 °C)    Resp 23    Wt 111.1 kg (244 lb 14.9 oz)    SpO2 97%    BMI 38.36 kg/m2      Tmax/24hrs: Temp (24hrs), Av.9 °F (37.2 °C), Min:97.8 °F (36.6 °C), Max:100 °F (37.8 °C)  IOBRIEF    Intake/Output Summary (Last 24 hours) at 17 1437  Last data filed at 17 0600   Gross per 24 hour   Intake              720 ml   Output             1880 ml   Net            -1160 ml       General:  Ill appearing   HEENT:  NC, Atraumatic. PERRLA, anicteric sclerae. Pulmonary:  CTA Bilaterally. No Wheezing/Rhonchi/Rales. Cardiovascular: Regular rate and Rhythm. GI:  Soft, Non distended, Non tender. + Bowel sounds. Extremities:  No edema, cyanosis, clubbing. No calf tenderness. Psych:  Not anxious or agitated. Neurologic: Grossly - Motor and Sensory functions are intact .  No Anxiety , calm , no Agitation         Medications:   Current Facility-Administered Medications Medication Dose Route Frequency    cefTRIAXone (ROCEPHIN) 2 g in 0.9% sodium chloride (MBP/ADV) 50 mL MBP  2 g IntraVENous Q24H    insulin lispro (HUMALOG) injection 5 Units  5 Units SubCUTAneous TIDAC    insulin glargine (LANTUS) injection 20 Units  20 Units SubCUTAneous DAILY    diphenhydrAMINE (BENADRYL) capsule 50 mg  50 mg Oral Q4H PRN    polyethylene glycol (MIRALAX) packet 17 g  17 g Oral TID    insulin glargine (LANTUS) injection 20 Units  20 Units SubCUTAneous QHS    cholecalciferol (VITAMIN D3) tablet 2,000 Units  2,000 Units Oral DAILY    docusate sodium (COLACE) capsule 100 mg  100 mg Oral DAILY AFTER BREAKFAST    gabapentin (NEURONTIN) capsule 400 mg  400 mg Oral BID    oxyCODONE-acetaminophen (PERCOCET 7.5) 7.5-325 mg per tablet 1 Tab  1 Tab Oral Q4H PRN    pravastatin (PRAVACHOL) tablet 40 mg  40 mg Oral QHS    senna-docusate (PERICOLACE) 8.6-50 mg per tablet 2 Tab  2 Tab Oral PCD    acetaminophen (TYLENOL) tablet 500 mg  500 mg Oral Q6H PRN    allopurinol (ZYLOPRIM) tablet 100 mg  100 mg Oral DAILY    clotrimazole (MYCELEX) frances 10 mg  10 mg Oral 5XD    folic acid (FOLVITE) tablet 1 mg  1 mg Oral DAILY    ondansetron (ZOFRAN) injection 4 mg  4 mg IntraVENous Q8H PRN    insulin lispro (HUMALOG) injection   SubCUTAneous Q6H    glucose chewable tablet 16 g  16 g Oral PRN    glucagon (GLUCAGEN) injection 1 mg  1 mg IntraMUSCular PRN    dextrose (D50W) injection syrg 12.5-25 g  25-50 mL IntraVENous PRN    pantoprazole (PROTONIX) 40 mg in sodium chloride 0.9 % 10 mL injection  40 mg IntraVENous DAILY       Labs:    Recent Results (from the past 24 hour(s))   GLUCOSE, POC    Collection Time: 04/24/17  5:42 PM   Result Value Ref Range    Glucose (POC) 153 (H) 70 - 110 mg/dL   GLUCOSE, POC    Collection Time: 04/24/17 11:23 PM   Result Value Ref Range    Glucose (POC) 83 70 - 110 mg/dL   GLUCOSE, POC    Collection Time: 04/25/17  7:23 AM   Result Value Ref Range    Glucose (POC) 142 (H) 70 - 110 mg/dL   GLUCOSE, POC    Collection Time: 04/25/17 11:03 AM   Result Value Ref Range    Glucose (POC) 170 (H) 70 - 110 mg/dL       Signed By: Jed Gordon MD     April 25, 2017

## 2017-04-25 NOTE — PROGRESS NOTES
No subjective change other than feels pain in knees  Can slowly wiggle R toes  1/5 Knee ext.  0/power on LLE  Sensory level more apparent today. ...t9-10  No bowel movement last 24 hours.   Watch closely

## 2017-04-25 NOTE — DIABETES MGMT
GLYCEMIC CONTROL PLAN OF CARE    Assessment/Recommendations:  Blood glucose has improved with increase in Lantus  Continue correctional Lispro insulin coverage  Continue inpatient monitoring and intervention    Most recent blood glucose values:  4/24/2017 17:42 4/24/2017 23:23 4/25/2017 07:23 4/25/2017 11:03   153 (H) 83 142 (H) 170 (H)     Current A1C of 8.0% is equivalent to average blood glucose of 183 mg/dl over the past 2-3 months.      Current hospital diabetes medications:   Lantus insulin 20 units daily and 20 units nightly  Prandial Lispro insulin 5 units TID before meals  Correctional Lispro insulin every 6 hours (very insulin resistant scale)      Previous day's insulin requirements:   Lantus insulin 40 units  Lispro insulin 24 units      Home diabetes medications:  Januvia  Lantus insulin 15 units nightly   Novolog insulin per sliding scale      Diet: Diabetic Consistent Carbohydrate    Education:  __x__Refer to Diabetes Education Record (3/31/17)            ____Education not indicated at this time      Cortney Covarrubias RD, CDE

## 2017-04-26 ENCOUNTER — APPOINTMENT (OUTPATIENT)
Dept: INTERVENTIONAL RADIOLOGY/VASCULAR | Age: 67
DRG: 853 | End: 2017-04-26
Attending: INTERNAL MEDICINE
Payer: COMMERCIAL

## 2017-04-26 ENCOUNTER — APPOINTMENT (OUTPATIENT)
Dept: CT IMAGING | Age: 67
DRG: 853 | End: 2017-04-26
Attending: ORTHOPAEDIC SURGERY
Payer: COMMERCIAL

## 2017-04-26 LAB
ANION GAP BLD CALC-SCNC: 8 MMOL/L (ref 3–18)
BASOPHILS # BLD AUTO: 0 K/UL (ref 0–0.06)
BASOPHILS # BLD: 0 % (ref 0–3)
BUN SERPL-MCNC: 34 MG/DL (ref 7–18)
BUN/CREAT SERPL: 24 (ref 12–20)
CALCIUM SERPL-MCNC: 8.2 MG/DL (ref 8.5–10.1)
CHLORIDE SERPL-SCNC: 102 MMOL/L (ref 100–108)
CO2 SERPL-SCNC: 28 MMOL/L (ref 21–32)
CREAT SERPL-MCNC: 1.43 MG/DL (ref 0.6–1.3)
DIFFERENTIAL METHOD BLD: ABNORMAL
EOSINOPHIL # BLD: 0.2 K/UL (ref 0–0.4)
EOSINOPHIL NFR BLD: 1 % (ref 0–5)
ERYTHROCYTE [DISTWIDTH] IN BLOOD BY AUTOMATED COUNT: 15.6 % (ref 11.6–14.5)
GLUCOSE BLD STRIP.AUTO-MCNC: 137 MG/DL (ref 70–110)
GLUCOSE BLD STRIP.AUTO-MCNC: 152 MG/DL (ref 70–110)
GLUCOSE BLD STRIP.AUTO-MCNC: 219 MG/DL (ref 70–110)
GLUCOSE BLD STRIP.AUTO-MCNC: 221 MG/DL (ref 70–110)
GLUCOSE BLD STRIP.AUTO-MCNC: 264 MG/DL (ref 70–110)
GLUCOSE SERPL-MCNC: 201 MG/DL (ref 74–99)
HCT VFR BLD AUTO: 26.4 % (ref 35–45)
HGB BLD-MCNC: 8.7 G/DL (ref 12–16)
LYMPHOCYTES # BLD AUTO: 7 % (ref 20–51)
LYMPHOCYTES # BLD: 1.4 K/UL (ref 0.8–3.5)
MCH RBC QN AUTO: 25.7 PG (ref 24–34)
MCHC RBC AUTO-ENTMCNC: 33 G/DL (ref 31–37)
MCV RBC AUTO: 77.9 FL (ref 74–97)
MONOCYTES # BLD: 0.6 K/UL (ref 0–1)
MONOCYTES NFR BLD AUTO: 3 % (ref 2–9)
NEUTS BAND NFR BLD MANUAL: 4 % (ref 0–5)
NEUTS SEG # BLD: 18.4 K/UL (ref 1.8–8)
NEUTS SEG NFR BLD AUTO: 85 % (ref 42–75)
PLATELET # BLD AUTO: 110 K/UL (ref 135–420)
PLATELET COMMENTS,PCOM: ABNORMAL
PMV BLD AUTO: 10 FL (ref 9.2–11.8)
POTASSIUM SERPL-SCNC: 4.3 MMOL/L (ref 3.5–5.5)
RBC # BLD AUTO: 3.39 M/UL (ref 4.2–5.3)
RBC MORPH BLD: ABNORMAL
SODIUM SERPL-SCNC: 138 MMOL/L (ref 136–145)
WBC # BLD AUTO: 20.6 K/UL (ref 4.6–13.2)

## 2017-04-26 PROCEDURE — 80048 BASIC METABOLIC PNL TOTAL CA: CPT | Performed by: INTERNAL MEDICINE

## 2017-04-26 PROCEDURE — 36415 COLL VENOUS BLD VENIPUNCTURE: CPT | Performed by: INTERNAL MEDICINE

## 2017-04-26 PROCEDURE — 85025 COMPLETE CBC W/AUTO DIFF WBC: CPT | Performed by: INTERNAL MEDICINE

## 2017-04-26 PROCEDURE — 65660000000 HC RM CCU STEPDOWN

## 2017-04-26 PROCEDURE — 82962 GLUCOSE BLOOD TEST: CPT

## 2017-04-26 PROCEDURE — 97530 THERAPEUTIC ACTIVITIES: CPT

## 2017-04-26 PROCEDURE — 74011636637 HC RX REV CODE- 636/637: Performed by: INTERNAL MEDICINE

## 2017-04-26 PROCEDURE — 74011250637 HC RX REV CODE- 250/637: Performed by: HOSPITALIST

## 2017-04-26 PROCEDURE — 74176 CT ABD & PELVIS W/O CONTRAST: CPT

## 2017-04-26 PROCEDURE — 74011250636 HC RX REV CODE- 250/636: Performed by: INTERNAL MEDICINE

## 2017-04-26 PROCEDURE — 87040 BLOOD CULTURE FOR BACTERIA: CPT | Performed by: ORTHOPAEDIC SURGERY

## 2017-04-26 PROCEDURE — 74011250637 HC RX REV CODE- 250/637: Performed by: INTERNAL MEDICINE

## 2017-04-26 PROCEDURE — 77001 FLUOROGUIDE FOR VEIN DEVICE: CPT

## 2017-04-26 PROCEDURE — 74011000250 HC RX REV CODE- 250: Performed by: INTERNAL MEDICINE

## 2017-04-26 PROCEDURE — 74011000258 HC RX REV CODE- 258: Performed by: INTERNAL MEDICINE

## 2017-04-26 PROCEDURE — C9113 INJ PANTOPRAZOLE SODIUM, VIA: HCPCS | Performed by: INTERNAL MEDICINE

## 2017-04-26 PROCEDURE — 05H533Z INSERTION OF INFUSION DEVICE INTO RIGHT SUBCLAVIAN VEIN, PERCUTANEOUS APPROACH: ICD-10-PCS | Performed by: RADIOLOGY

## 2017-04-26 RX ORDER — FAMOTIDINE 20 MG/1
20 TABLET, FILM COATED ORAL 2 TIMES DAILY
Status: DISCONTINUED | OUTPATIENT
Start: 2017-04-27 | End: 2017-05-01

## 2017-04-26 RX ADMIN — CLOTRIMAZOLE 10 MG: 10 LOZENGE ORAL at 17:35

## 2017-04-26 RX ADMIN — FOLIC ACID 1 MG: 1 TABLET ORAL at 10:07

## 2017-04-26 RX ADMIN — Medication 2 TABLET: at 17:35

## 2017-04-26 RX ADMIN — DOCUSATE SODIUM 100 MG: 100 CAPSULE, LIQUID FILLED ORAL at 10:07

## 2017-04-26 RX ADMIN — CLOTRIMAZOLE 10 MG: 10 LOZENGE ORAL at 10:07

## 2017-04-26 RX ADMIN — CEFTRIAXONE SODIUM 2 G: 2 INJECTION, POWDER, FOR SOLUTION INTRAMUSCULAR; INTRAVENOUS at 10:26

## 2017-04-26 RX ADMIN — PRAVASTATIN SODIUM 40 MG: 20 TABLET ORAL at 23:07

## 2017-04-26 RX ADMIN — POLYETHYLENE GLYCOL 3350 17 G: 17 POWDER, FOR SOLUTION ORAL at 16:50

## 2017-04-26 RX ADMIN — CLOTRIMAZOLE 10 MG: 10 LOZENGE ORAL at 23:07

## 2017-04-26 RX ADMIN — POLYETHYLENE GLYCOL 3350 17 G: 17 POWDER, FOR SOLUTION ORAL at 10:06

## 2017-04-26 RX ADMIN — OXYCODONE HYDROCHLORIDE AND ACETAMINOPHEN 1 TABLET: 7.5; 325 TABLET ORAL at 16:50

## 2017-04-26 RX ADMIN — INSULIN LISPRO 9 UNITS: 100 INJECTION, SOLUTION INTRAVENOUS; SUBCUTANEOUS at 13:16

## 2017-04-26 RX ADMIN — SODIUM CHLORIDE 40 MG: 9 INJECTION INTRAMUSCULAR; INTRAVENOUS; SUBCUTANEOUS at 10:06

## 2017-04-26 RX ADMIN — ALLOPURINOL 100 MG: 100 TABLET ORAL at 10:07

## 2017-04-26 RX ADMIN — INSULIN LISPRO 6 UNITS: 100 INJECTION, SOLUTION INTRAVENOUS; SUBCUTANEOUS at 17:34

## 2017-04-26 RX ADMIN — POLYETHYLENE GLYCOL 3350 17 G: 17 POWDER, FOR SOLUTION ORAL at 23:07

## 2017-04-26 RX ADMIN — INSULIN GLARGINE 20 UNITS: 100 INJECTION, SOLUTION SUBCUTANEOUS at 23:11

## 2017-04-26 RX ADMIN — ACETAMINOPHEN 500 MG: 500 TABLET, COATED ORAL at 00:32

## 2017-04-26 RX ADMIN — OXYCODONE HYDROCHLORIDE AND ACETAMINOPHEN 1 TABLET: 7.5; 325 TABLET ORAL at 10:25

## 2017-04-26 RX ADMIN — CHOLECALCIFEROL TAB 25 MCG (1000 UNIT) 2000 UNITS: 25 TAB at 10:07

## 2017-04-26 RX ADMIN — GABAPENTIN 400 MG: 400 CAPSULE ORAL at 17:35

## 2017-04-26 RX ADMIN — INSULIN GLARGINE 20 UNITS: 100 INJECTION, SOLUTION SUBCUTANEOUS at 10:11

## 2017-04-26 RX ADMIN — INSULIN LISPRO 2 UNITS: 100 INJECTION, SOLUTION INTRAVENOUS; SUBCUTANEOUS at 23:10

## 2017-04-26 RX ADMIN — GABAPENTIN 400 MG: 400 CAPSULE ORAL at 10:07

## 2017-04-26 RX ADMIN — CLOTRIMAZOLE 10 MG: 10 LOZENGE ORAL at 15:25

## 2017-04-26 RX ADMIN — INSULIN LISPRO 5 UNITS: 100 INJECTION, SOLUTION INTRAVENOUS; SUBCUTANEOUS at 10:09

## 2017-04-26 NOTE — PROGRESS NOTES
Problem: Mobility Impaired (Adult and Pediatric)  Goal: *Acute Goals and Plan of Care (Insert Text)  STGs to be addressed within 3 days:  1. Bed mobility: Supine to sit to supine CGA/SBA with HR for meals. 2. Activity tolerance: Tolerate EOB > 15 minutes for ADLs. 3. Transfers: SPT-->to chair max/mod A with LRAD for ADLs. LTGs to be addressed within 7 days:  1. Transfers: SB-->to chair/wc max/mod A for ADLs. 2. Activity tolerance: Tolerated > 1 hr in chair for change of position. 3. Patient Education: Independent with HEP for home safety. 4. Stairs: Up/Down 2 steps CGA with HR for home entry. Outcome: Progressing Towards Goal  PHYSICAL THERAPY TREATMENT     Patient: Karen Mojica (82 y.o. female)  Date: 4/26/2017  Diagnosis: Acute paraplegia  Acute paraplegia (HCC) Acute paraplegia [facility])  Precautions: Fall, Skin   Chart, physical therapy assessment, plan of care and goals were reviewed. ASSESSMENT:  Patient reporting increased burning sensation in L LE and general increased sensation in R LE. Patient does endorse mild c/o soreness in abdomen. Patient required mod/max A x 2 persons for rolling, supine-->sit. Static sitting balance fair. Patient able to utilize B UE for support and maintaining stable sitting. Completed dynamic seated trunk activities with alternating UE movements. Patient with good demonstration of core muscle engagement. Patient tolerated seated EOB ~ 20 minutes, only initial c/o dizziness, subsided with prolonged seated activities. Patient was assisted back to semi-reclined position in bed, positioned for comfort. Will continue to progress patient as tolerated.   Progression toward goals:  [ ]      Improving appropriately and progressing toward goals  [X]      Improving slowly and progressing toward goals  [ ]      Not making progress toward goals and plan of care will be adjusted       PLAN:   Patient continues to benefit from skilled intervention to address the above impairments. Continue treatment per established plan of care. Discharge Recommendations:  Inpatient Rehab-spinal cord rehabilitation unit  Further Equipment Recommendations for Discharge: To be determined       SUBJECTIVE:   Patient stated I will try.       OBJECTIVE DATA SUMMARY:   Critical Behavior:  Neurologic State: Alert  Orientation Level: Oriented X4  Cognition: Follows commands, Appropriate decision making, Appropriate for age attention/concentration, Appropriate safety awareness  Safety/Judgement: Awareness of environment, Fall prevention  Functional Mobility Training:  Bed Mobility:  Rolling: Moderate assistance;Assist x2; Additional time  Supine to Sit: Moderate assistance;Maximum assistance;Assist x2; Additional time  Sit to Supine: Maximum assistance;Assist x2; Additional time  Scooting: Total assistance;Assist x2; Additional time              Interventions: Verbal cues;Manual cues  Balance:  Sitting: Impaired; With support;High guard (B UE support)  Sitting - Static: Fair (occasional)  Sitting - Dynamic: Fair (occasional)  Standing:  (N/A)  Pain:  Pain Scale 1: Numeric (0 - 10)  Pain Intensity 1: 0  Activity Tolerance:   Good  Please refer to the flowsheet for vital signs taken during this treatment.   After treatment:   [ ] Patient left in no apparent distress sitting up in chair  [X] Patient left in no apparent distress in bed  [X] Call bell left within reach  [X] Nursing notified  [ ] Caregiver present  [ ] Bed alarm activated      Bryn Stewart PT   Time Calculation: 30 mins

## 2017-04-26 NOTE — PROCEDURES
DATE: 4/26/17    PROCEDURE(S):  1. Peripherally inserted central venous catheter (PICC) insertion using sonographic and fluoroscopic guidance    INDICATION: 70-year-old female. History of difficult intravenous access and/or long-term intravenous therapy needed. PICC insertion requested. TECHNIQUE: Expected benefits, potential risks, and alternatives to the procedure were discussed with the patient (and/or surrogate decision maker, as applicable) and all questions were answered. Discussed risks include - but are not limited to - bleeding, infection, vascular injury, arrhythmia, thromboembolic events, device dysfunction, and medication reaction. Informed consent was obtained. The patient was placed on the procedure table, the upper extremity was prepared in usual sterile fashion, and the ensuing procedure was performed with full barrier precaution, including caps, masks, gowns, gloves, and drapes. Procedure verification was completed. Upon physician review, a patent vessel was not able to be identified. Consequently, ultrasound evaluation of potential access sites was performed. After successfully identifying a patent vessel, intravascular access was achieved under real-time ultrasound guidance. The needle was visualized entering the vessel. An image was permanently recorded and archived in PACS. An 018 guidewire was introduced through the needle and advanced centrally under fluoroscopic guidance. A dermal incision was made, the needle was removed, and a peel-away sheath was placed. The catheter was trimmed to the desired length and inserted through the peel-away sheath, which was removed as the catheter was advancedcentrally. Blood freely aspirated and the catheter readily flushed. The catheter was flushed, capped, secured in place, and sterilely dressed. An image was obtained to document final position. The patient tolerated the procedure well.     ACCESS: Right basilic vein    CONTRAST: None    FLUOROSCOPY: 0.2 minutes; 1 image    MEDICATION(S): Local lidocaine    SEDATION TIME: None    COMPLICATION(S): None    ESTIMATED BLOOD LOSS: Minimal    BLOOD ADMINISTERED: None    SPECIMEN: None    IMPLANT(S): None    DRAIN(S): None    : Evon Cortez MD    ASSISTANT(S): None    FINDINGS:  1. 5-Irish double lumen PICC trimmed to 27 cm terminates at subclavian vein    IMPRESSION:  Technically successful insertion of PICC, which is ready for immediate use, including CT power injection.

## 2017-04-26 NOTE — PROGRESS NOTES
[facility] Hospitalist Group  Progress Note    Patient: Rachel Maravilla Age: 77 y.o. : 1950 MR#: [id number] SSN: [id number]  Date/Time: 2017     Subjective:     Feels better   Some sensation in left legs / knee pains she can feel   Weakness present both Lower limbs     Assessment/Plan:    77 yr old Female with PMH of iliopsoas hematoma , patient developed sudden onset of Right  leg weakness , patient evaluated by rehab MD and was transferred to medical service for further management . Dr Wesly Valles - spine surgery consulted , neurology consulted , patient is admitted to ICU        1 Right Leg weakness   - Psoas infected hematoma   - continues to have neurological deficit , she experienced burning sensations in left calf       2 CMO - LBBB, - followed by cardiology     3 DM2  - Her po intake is unpredictable  - continue accu checks q 6 hrs with coverage    4 H/O DVT off  AC - due to Bleed     5 Elevated trop - From stress , S/B cardi - Continue current treatments     6 Acute blood loss anemia   - Monitor H/H , BT if h/h < 7     7 UTI- POA   - Continue current antibiotics   - Follow urine culture       D/w patient and her family in room with her   On discharge they would like to go yris       Case discussed with:  [x]Patient  [x]Family  [x]Nursing  []Case Management  DVT Prophylaxis:  []Lovenox  []Hep SQ  [x]SCDs  []Coumadin   []On Heparin gtt    Patient Active Problem List   Diagnosis Code    Nonischemic cardiomyopathy ([facility] I42.8    History of complete heart block Z86.79    Biventricular implantable cardioverter-defibrillator in situ Z95.810    Left bundle branch block (LBBB) on electrocardiogram I44.7    Type 2 diabetes mellitus with diabetic neuropathy ([facility] E11.40    Dyslipidemia E78.5    Diabetic neuropathy associated with type 2 diabetes mellitus ([facility] E11.40    Obstructive sleep apnea on CPAP G47.33, Z99.89    AICD generator infection ([facility] T82. 7XXA    Difficult airway for intubation T88. 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 1XXA    Impaired mobility and ADLs Z74.09    History of Coumadin therapy Z79.01    Gout M10.9    Acute paraplegia (HCC) G82.20    Sepsis ([facility] A41.9    Psoas abscess, right ([address] K68.12    Krueger catheter in place on admission Z96.0    Urinary tract infection due to Enterococcus N39.0, B95.2    Group B streptococcal infection A49.1       Objective:   VS:   Visit Vitals    /67 (BP 1 Location: Left arm, BP Patient Position: At rest)    Pulse 84    Temp 98 °F (36.7 °C)    Resp 12    Wt 111.1 kg (244 lb 14.9 oz)    SpO2 94%    BMI 38.36 kg/m2      Tmax/24hrs: Temp (24hrs), Av.5 °F (37.5 °C), Min:98 °F (36.7 °C), Max:100.9 °F (38.3 °C)  IOBRIEF    Intake/Output Summary (Last 24 hours) at 17 1419  Last data filed at 17 0600   Gross per 24 hour   Intake                0 ml   Output             2000 ml   Net            -2000 ml       General:  Ill appearing   HEENT:  NC, Atraumatic. PERRLA, anicteric sclerae. Pulmonary:  CTA Bilaterally. No Wheezing/Rhonchi/Rales. Cardiovascular: Regular rate and Rhythm. GI:  Soft, Non distended, Non tender. + Bowel sounds. Extremities:  No edema, cyanosis, clubbing. No calf tenderness. Psych:  Not anxious or agitated. Neurologic: Grossly - Motor and Sensory functions are intact .  No Anxiety , calm , no Agitation         Medications:   Current Facility-Administered Medications   Medication Dose Route Frequency    [START ON 4/27/2017] famotidine (PEPCID) tablet 20 mg  20 mg Oral BID    cefTRIAXone (ROCEPHIN) 2 g in 0.9% sodium chloride (MBP/ADV) 50 mL MBP  2 g IntraVENous Q24H    insulin glargine (LANTUS) injection 20 Units  20 Units SubCUTAneous DAILY    diphenhydrAMINE (BENADRYL) capsule 50 mg  50 mg Oral Q4H PRN    polyethylene glycol (MIRALAX) packet 17 g  17 g Oral TID    insulin glargine (LANTUS) injection 20 Units  20 Units SubCUTAneous QHS    cholecalciferol (VITAMIN D3) tablet 2,000 Units  2,000 Units Oral DAILY    docusate sodium (COLACE) capsule 100 mg  100 mg Oral DAILY AFTER BREAKFAST    gabapentin (NEURONTIN) capsule 400 mg  400 mg Oral BID    oxyCODONE-acetaminophen (PERCOCET 7.5) 7.5-325 mg per tablet 1 Tab  1 Tab Oral Q4H PRN    pravastatin (PRAVACHOL) tablet 40 mg  40 mg Oral QHS    senna-docusate (PERICOLACE) 8.6-50 mg per tablet 2 Tab  2 Tab Oral PCD    acetaminophen (TYLENOL) tablet 500 mg  500 mg Oral Q6H PRN    allopurinol (ZYLOPRIM) tablet 100 mg  100 mg Oral DAILY    clotrimazole (MYCELEX) frances 10 mg  10 mg Oral 5XD    folic acid (FOLVITE) tablet 1 mg  1 mg Oral DAILY    ondansetron (ZOFRAN) injection 4 mg  4 mg IntraVENous Q8H PRN    insulin lispro (HUMALOG) injection   SubCUTAneous Q6H    glucose chewable tablet 16 g  16 g Oral PRN    glucagon (GLUCAGEN) injection 1 mg  1 mg IntraMUSCular PRN    dextrose (D50W) injection syrg 12.5-25 g  25-50 mL IntraVENous PRN       Labs:    Recent Results (from the past 24 hour(s))   GLUCOSE, POC    Collection Time: 04/25/17  4:55 PM   Result Value Ref Range    Glucose (POC) 175 (H) 70 - 110 mg/dL   GLUCOSE, POC    Collection Time: 04/25/17  8:24 PM   Result Value Ref Range    Glucose (POC) 125 (H) 70 - 110 mg/dL   GLUCOSE, POC    Collection Time: 04/26/17  8:46 AM   Result Value Ref Range    Glucose (POC) 221 (H) 70 - 110 mg/dL   CBC WITH AUTOMATED DIFF    Collection Time: 04/26/17 10:10 AM Result Value Ref Range    WBC 20.6 (H) 4.6 - 13.2 K/uL    RBC 3.39 (L) 4.20 - 5.30 M/uL    HGB 8.7 (L) 12.0 - 16.0 g/dL    HCT 26.4 (L) 35.0 - 45.0 %    MCV 77.9 74.0 - 97.0 FL    MCH 25.7 24.0 - 34.0 PG    MCHC 33.0 31.0 - 37.0 g/dL    RDW 15.6 (H) 11.6 - 14.5 %    PLATELET 133 (L) 276 - 420 K/uL    MPV 10.0 9.2 - 11.8 FL    NEUTROPHILS 85 (H) 42 - 75 %    BAND NEUTROPHILS 4 0 - 5 %    LYMPHOCYTES 7 (L) 20 - 51 %    MONOCYTES 3 2 - 9 %    EOSINOPHILS 1 0 - 5 %    BASOPHILS 0 0 - 3 %    ABS. NEUTROPHILS 18.4 (H) 1.8 - 8.0 K/UL    ABS. LYMPHOCYTES 1.4 0.8 - 3.5 K/UL    ABS. MONOCYTES 0.6 0 - 1.0 K/UL    ABS. EOSINOPHILS 0.2 0.0 - 0.4 K/UL    ABS.  BASOPHILS 0.0 0.0 - 0.06 K/UL    DF MANUAL      PLATELET COMMENTS DECREASED PLATELETS      RBC COMMENTS ANISOCYTOSIS  1+        RBC COMMENTS MICROCYTOSIS  1+        RBC COMMENTS POLYCHROMASIA  1+        RBC COMMENTS FEW TARGET CELLS    METABOLIC PANEL, BASIC    Collection Time: 04/26/17 10:10 AM   Result Value Ref Range    Sodium 138 136 - 145 mmol/L    Potassium 4.3 3.5 - 5.5 mmol/L    Chloride 102 100 - 108 mmol/L    CO2 28 21 - 32 mmol/L    Anion gap 8 3.0 - 18 mmol/L    Glucose 201 (H) 74 - 99 mg/dL    BUN 34 (H) 7.0 - 18 MG/DL    Creatinine 1.43 (H) 0.6 - 1.3 MG/DL    BUN/Creatinine ratio 24 (H) 12 - 20      GFR est AA 44 (L) >60 ml/min/1.73m2    GFR est non-AA 37 (L) >60 ml/min/1.73m2    Calcium 8.2 (L) 8.5 - 10.1 MG/DL   GLUCOSE, POC    Collection Time: 04/26/17  1:08 PM   Result Value Ref Range    Glucose (POC) 264 (H) 70 - 110 mg/dL       Signed By: Diamante Michael MD     April 26, 2017

## 2017-04-26 NOTE — PROGRESS NOTES
Telephone report given to [address] (oncoming nurse) by Marah Chavez (offgoing nurse). Report included the following information SBAR, Kardex, MAR and Recent Results.

## 2017-04-26 NOTE — PROGRESS NOTES
[facility] - Progress Note  Admit Date: 4/20/2017    Assessment:     Hospital Problems  Date Reviewed: 4/20/2017          Codes Class Noted POA    * (Principal)Acute paraplegia ([facility] ICD-10-CM: G82.20  ICD-9-CM: 344.1  4/20/2017 Yes        Sepsis ([facility] ICD-10-CM: A41.9  ICD-9-CM: 038.9, 995.91  4/20/2017 Yes        Psoas abscess, right ([facility] ICD-10-CM: U77.41  ICD-9-CM: 567.31  4/20/2017 Yes        Krueger catheter in place on admission ICD-10-CM: Z96.0  ICD-9-CM: V45.89  4/20/2017 Yes        Urinary tract infection due to Enterococcus ICD-10-CM: N39.0, B95.2  ICD-9-CM: 599.0, 041.04  4/20/2017 Yes        Group B streptococcal infection ICD-10-CM: A49.1  ICD-9-CM: 041.02  4/20/2017 Yes        History of Coumadin therapy ICD-10-CM: Z79.01  ICD-9-CM: V58.61  Unknown Yes    Overview Signed 4/6/2017 10:35 PM by Brandon Laguna MD     Anticoagulation for chronic atrial fibrillation; Discontinued on 3/30/2017             Decreased calculated glomerular filtration rate (GFR) ICD-10-CM: R94.4  ICD-9-CM: 794.4  3/30/2017 Yes    Overview Signed 4/6/2017  5:47 PM by Brandon Laguna MD     Calculated GFR equivalent to that of CKD stage 3 = 30-59 ml/min             Iliopsoas muscle hematoma ICD-10-CM: S70.10XA  ICD-9-CM: 924.00  3/30/2017 Yes        Psoas hematoma, right, secondary to anticoagulant therapy ICD-10-CM: S30. 1XXA  ICD-9-CM: 924.9, E934.2  3/30/2017 Yes        Impaired mobility and ADLs ICD-10-CM: Z74.09  ICD-9-CM: 799.89  3/30/2017 Yes        Benign hypertensive heart disease with systolic CHF, NYHA class 2 (HCC) (Chronic) ICD-10-CM: I11.0, I50.20  ICD-9-CM: 402.11, 428.20, 428.0  9/5/2012 Yes        AICD generator infection ([facility] ICD-10-CM: T82. 7XXA  ICD-9-CM: 996.61  8/20/2012 Yes    Overview Signed 8/20/2012  6:13 PM by Domenic Nino MD     S/p explant 4 leads 8/20/12             Type 2 diabetes mellitus with diabetic neuropathy (HCC) (Chronic) ICD-10-CM: E11.40  ICD-9-CM: 250.60, 357.2  6/28/2011 Yes              -Pacemaker pocket pain, cannot exclude seeding from abscess. Limited options given device dependence and obstruction. Treating conservatively. -Sepsis secondary to infected right psoas hematoma/epidural abscess with neurological compromise resulting in paraplegia. Now s/p drain by IR.  -Heart block s/p AICD 2005 with upgrade to BiV later that year, removed however in 2012 due to trauma and infection. New biventricular pacemaker LUQ 9/2012 by Dr. 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 Pacemaker dependent. Normal function 3/2017. Patient does report discomfort left upper quadrant following fall last month. Right axillary vein obstruction by venogram 2012.   -Elevated troponin likely demand ischemia in setting of sepsis. -H/p transient nonischemic CMY with EF 45% (45-50% 6/2015 with patent coronaries on cath 1996). No ischemia on nuclear 6/2015.  -Acute kidney injury, improving  -Diabetes mellitus.  -Dyslipidemia. -H/o HTN. -LBBB. -HALI on CPAP. -H/o DVT. Plan:     Low grade fever overnight but pain at pacemaker site improving. Will continue to follow clinically. Subjective:     No new complaints.      Objective:      Patient Vitals for the past 8 hrs:   Temp Pulse Resp BP SpO2   04/26/17 0800 - 95 28 116/52 98 %   04/26/17 0600 - 84 22 116/51 96 %   04/26/17 0400 98.2 °F (36.8 °C) 85 25 103/47 97 %   04/26/17 0200 - 98 24 106/44 94 %         Patient Vitals for the past 96 hrs:   Weight   04/26/17 0737 111.1 kg (244 lb 14.9 oz)   04/25/17 0729 111.1 kg (244 lb 14.9 oz)   04/24/17 0800 116.4 kg (256 lb 9.9 oz)   04/23/17 0602 109 kg (240 lb 4.8 oz)                    Intake/Output Summary (Last 24 hours) at 04/26/17 0200  Last data filed at 04/26/17 0600   Gross per 24 hour   Intake              240 ml   Output             2000 ml   Net            -1760 ml       Physical Exam:  General:  alert, cooperative, no distress, appears stated age  Neck:  nontender  Lungs:  clear to auscultation bilaterally  Heart:  regular rate and rhythm, S1, S2 normal, no murmur, click, rub or gallop, pacemaker pocket pain minimal, improving  Abdomen:  abdomen is soft without significant tenderness, masses, organomegaly or guarding  Extremities:  extremities normal, atraumatic, no cyanosis or edema    Data Review:     Labs: Results:       Chemistry Recent Labs      04/24/17   0400   GLU  239*   NA  139   K  4.5   CL  106   CO2  29   BUN  50*   CREA  1.29   CA  8.5   AGAP  4   BUCR  39*      CBC w/Diff Recent Labs      04/24/17   0400   WBC  13.1   RBC  3.08*   HGB  7.9*   HCT  23.4*   PLT  195      Cardiac Enzymes No results found for: CPK, CKMMB, CKMB, RCK3, CKMBT, CKNDX, CKND1, CARLI, TROPT, TROIQ, AMBERLY, TROPT, TNIPOC, BNP, BNPP   Coagulation No results for input(s): PTP, INR, APTT in the last 72 hours. No lab exists for component: INREXT    Lipid Panel Lab Results   Component Value Date/Time    Cholesterol, total 154 07/24/2012 01:00 AM    HDL Cholesterol 48 07/24/2012 01:00 AM    LDL, calculated 90.4 07/24/2012 01:00 AM    VLDL, calculated 15.6 07/24/2012 01:00 AM    Triglyceride 78 07/24/2012 01:00 AM    CHOL/HDL Ratio 3.2 07/24/2012 01:00 AM      BNP Lab Results   Component Value Date/Time    B-type Natriuretic Peptide 111.9 11/19/2013 10:17 AM      Liver Enzymes No results for input(s): TP, ALB, TBIL, AP, SGOT, GPT in the last 72 hours.     No lab exists for component: DBIL   Digoxin    Thyroid Studies Lab Results   Component Value Date/Time    TSH 0.51 04/20/2017 07:26 PM          Signed By: Nate Cottrell MD     April 26, 2017

## 2017-04-26 NOTE — PROGRESS NOTES
Patient moved to fourth floor  VSS  Pt remains paraplegic  States she can move her toes, but this was not observed  Denies sensation with touch on toes  Alert and responsive  PICC line placed today  Continue antibiotics per ID   Recommend evaluation for spinal cord rehab    Rand Cuenca NP

## 2017-04-26 NOTE — PROGRESS NOTES
Bedside and Verbal shift change report given to Stacie Borrero RN by Edda Millan RN. Report included the following information SBAR, Kardex, MAR and Recent Results.

## 2017-04-26 NOTE — ROUTINE PROCESS
Bedside, Verbal and Written shift change report given to STACY Blankenship RN (oncoming nurse) by CINTIA Ruvalcaba RN (offgoing nurse). Report included the following information SBAR, Kardex, Intake/Output and Recent Results. Assumed care of pt laying in bed, a/o x 4, following commands, unable to feel sensation from knees down, moves upper extremeties purposefully. Denies pain at this time, family at bedside. 2000  Scheduled med's given, pt turned left. Family at bedside. 0000  Reassessed, tylenol given for fever of 100.4, pt tolerated well, repositioned. 0400  Reassessed, repositioned, pt denies pain at this time. Pt status unchanged. 0715  Bedside, Verbal and Written shift change report given to DANIEL Briscoe (oncoming nurse) by STACY Blnakenship RN (offgoing nurse). Report included the following information SBAR, Kardex, Intake/Output and Recent Results.

## 2017-04-26 NOTE — PROGRESS NOTES
Infectious Disease Progress Note    Requested by: Dr. Merritt, dr. Yanick Hogan    Reason: sepsis, acute paraplegia    Current abx Prior abx   Ceftriaxone since 4/24 Cefepime 4/20-4/21  vancomycin  4/20-4/24  Gentamicin 4/21-4/24  Metronidazole  4/20-4/24     Lines:       Assessment :    77 y.o., right handed female, with an established history of hypertension, complete heart block with permanent pacemaker placed, diabetes mellitus, diabetic peripheral neuropathy, obesity, admitted to [facility] on 4/20/17. Clinical presentation consistent with  Sepsis (POA)  secondary to group B streptococcus bloodstream infection (positive blood cultures 4/20, negative blood cultures 4/21). Most likely source of bloodstream infection is infected right psoas hematoma/abscess. 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 No signs of neurological recovery on today's exam     2 week h/o pain at the site of abdominal pacemaker, tenderness at the site - however, patient doesn't have erythema at the site of pacemaker. No fluid collection noted at pacemaker pocket site per usg 4/24. Quick clearance of bacteremia would argue against endocarditis/endovascular infection. At this time risk of removal of pacemaker/general pocket change exceed the benefit. Discussed with cardiology. Would recommend close monitoring. Abdominal usg 4/24 doesn't reveal evidence of abscess/fluid collection at the site of pacemaker pocket. Clinically better. Recommendations:    1. cont ceftriaxone  2. Ok to place picc line from Id standpoint. Will need longterm iv abx  3. F/u cbc, clinically    Above plan was discussed in details with patient, rn. . Please call me if any further questions or concerns. Will continue to participate in the care of this patient. subjective:    Feels better. Denies cp, sob.  Unable to move legs. decreased pain at the site of pacemaker left abdomen. No nausea, vomiting. No new rash/itching/joint pain/back pain. Home Medication List    Details   gabapentin (NEURONTIN) 400 mg capsule Take 1 Cap by mouth two (2) times a day. Indications: NEUROPATHIC PAIN  Qty: 30 Cap, Refills: 0    Associated Diagnoses: Iliopsoas muscle hematoma, right, subsequent encounter      cholecalciferol (VITAMIN D3) 1,000 unit tablet Take 2 Tabs by mouth daily. Indications: PREVENTION OF VITAMIN D DEFICIENCY  Qty: 30 Tab, Refills: 0      SITagliptin (JANUVIA) 50 mg tablet Take 50 mg by mouth daily. Indications: type 2 diabetes mellitus      potassium chloride (KLOR-CON M20) 20 mEq tablet Take 20 mEq by mouth two (2) times a day. Indications: HYPOKALEMIA PREVENTION      ondansetron (ZOFRAN ODT) 4 mg disintegrating tablet Take 4 mg by mouth every eight (8) hours as needed for Nausea. cyclobenzaprine (FLEXERIL) 10 mg tablet Take 10 mg by mouth as needed for Muscle Spasm(s). Indications: MUSCLE SPASM      carvedilol (COREG) 6.25 mg tablet Take 1 Tab by mouth two (2) times daily (with meals). Indications: hypertension  Qty: 30 Tab, Refills: 0    Associated Diagnoses: Benign hypertensive heart disease with systolic CHF, NYHA class 2 (HCC)      acetaminophen (TYLENOL) 325 mg tablet Take 2 Tabs by mouth every four (4) hours as needed (for fever or pain level less than 5/10). Indications: Fever, Pain  Qty: 30 Tab, Refills: 0    Associated Diagnoses: Iliopsoas muscle hematoma, right, subsequent encounter      allopurinol (ZYLOPRIM) 100 mg tablet Take 1 Tab by mouth daily. Indications: GOUT  Qty: 15 Tab, Refills: 0    Associated Diagnoses: Chronic gout, unspecified cause, unspecified site      insulin glargine (LANTUS) 100 unit/mL injection 15 Units by SubCUTAneous route nightly.  Indications: type 2 diabetes mellitus  Qty: 1 Vial, Refills: 0    Associated Diagnoses: Type 2 diabetes mellitus with diabetic neuropathy, with long-term current use of insulin (HCC)      docusate sodium (COLACE) 100 mg capsule Take 1 Cap by mouth daily (after breakfast). Indications: Constipation  Qty: 15 Cap, Refills: 0      senna-docusate (PERICOLACE) 8.6-50 mg per tablet Take 2 Tabs by mouth daily (after dinner). Indications: Constipation  Qty: 30 Tab, Refills: 0      oxyCODONE-acetaminophen (PERCOCET 7.5) 7.5-325 mg per tablet Take 1 Tab by mouth every four (4) hours as needed (for pain level greater than 4/10). Max Daily Amount: 6 Tabs. Indications: Pain  Qty: 50 Tab, Refills: 0    Associated Diagnoses: Iliopsoas muscle hematoma, right, subsequent encounter      bumetanide (BUMEX) 1 mg tablet TAKE 1 TABLET TWICE A DAY  Qty: 180 Tab, Refills: 1      pravastatin (PRAVACHOL) 40 mg tablet Take 40 mg by mouth nightly. Indications: DYSLIPIDEMIA      ferrous sulfate 325 mg (65 mg iron) tablet Take 1 Tab by mouth two (2) times daily (with meals).   Qty: 30 Tab, Refills: 0      insulin aspart (NOVOLOG) 100 unit/mL injection INITIATE INSULIN CORRECTIVE PROTOCOL (HR): Normal Insulin Sensitivity  For Blood Sugar (mg/dL) of:    Less than 150 =   0 units          150 -199 =   2 units 200 -249 =   4 units 250 -299 =   6 units 300 -349 =   8 units 350 and above =   10 units If 2 glucose readings are above 200 mg/dL  Qty: 10 mL, Refills: 6             Current Facility-Administered Medications   Medication Dose Route Frequency    cefTRIAXone (ROCEPHIN) 2 g in 0.9% sodium chloride (MBP/ADV) 50 mL MBP  2 g IntraVENous Q24H    insulin lispro (HUMALOG) injection 5 Units  5 Units SubCUTAneous TIDAC    insulin glargine (LANTUS) injection 20 Units  20 Units SubCUTAneous DAILY    diphenhydrAMINE (BENADRYL) capsule 50 mg  50 mg Oral Q4H PRN    polyethylene glycol (MIRALAX) packet 17 g  17 g Oral TID    insulin glargine (LANTUS) injection 20 Units  20 Units SubCUTAneous QHS    cholecalciferol (VITAMIN D3) tablet 2,000 Units  2,000 Units Oral DAILY    docusate sodium (COLACE) capsule 100 mg  100 mg Oral DAILY AFTER BREAKFAST    gabapentin (NEURONTIN) capsule 400 mg  400 mg Oral BID    oxyCODONE-acetaminophen (PERCOCET 7.5) 7.5-325 mg per tablet 1 Tab  1 Tab Oral Q4H PRN    pravastatin (PRAVACHOL) tablet 40 mg  40 mg Oral QHS    senna-docusate (PERICOLACE) 8.6-50 mg per tablet 2 Tab  2 Tab Oral PCD    acetaminophen (TYLENOL) tablet 500 mg  500 mg Oral Q6H PRN    allopurinol (ZYLOPRIM) tablet 100 mg  100 mg Oral DAILY    clotrimazole (MYCELEX) frances 10 mg  10 mg Oral 5XD    folic acid (FOLVITE) tablet 1 mg  1 mg Oral DAILY    ondansetron (ZOFRAN) injection 4 mg  4 mg IntraVENous Q8H PRN    insulin lispro (HUMALOG) injection   SubCUTAneous Q6H    glucose chewable tablet 16 g  16 g Oral PRN    glucagon (GLUCAGEN) injection 1 mg  1 mg IntraMUSCular PRN    dextrose (D50W) injection syrg 12.5-25 g  25-50 mL IntraVENous PRN    pantoprazole (PROTONIX) 40 mg in sodium chloride 0.9 % 10 mL injection  40 mg IntraVENous DAILY       Allergies: Vancomycin; Ampicillin; Bactrim [sulfamethoxazole-trimethoprim]; Blueberry; Ciprofloxacin; Codeine; Crestor [rosuvastatin]; Darvocet a500 [propoxyphene n-acetaminophen]; Demerol [meperidine]; Levaquin [levofloxacin]; Lipitor [atorvastatin]; Magnesium oxide; Minocin [minocycline]; Pcn [penicillins]; Pravachol [pravastatin]; Sulfa (sulfonamide antibiotics); Ultracet [tramadol-acetaminophen]; Vicodin [hydrocodone-acetaminophen]; Vytorin 10-10 [ezetimibe-simvastatin]; and Percodan [oxycodone hcl-oxycodone-asa]    Temp (24hrs), Av.9 °F (37.7 °C), Min:98.2 °F (36.8 °C), Max:100.9 °F (38.3 °C)    Visit Vitals    /52    Pulse 95    Temp 98.2 °F (36.8 °C)    Resp 28    Wt 111.1 kg (244 lb 14.9 oz)    SpO2 98%    BMI 38.36 kg/m2       ROS: patient unable to communicate fluently. Detailed ros not feasible.     Physical Exam:    General: Well developed, well nourished female laying on the bed AAOx3 NAD    General:   awake alert and oriented HEENT:  Normocephalic, atraumatic, PERRL, EOMI, no scleral icterus or pallor; no conjunctival hemmohage;  nasal and oral mucous are moist and without evidence of lesions. Neck supple, no bruits. Lymph Nodes:   no cervical, axillary or inguinal adenopathy   Lungs:   non-labored, bilaterally clear to auscultation- no crackles wheezes rales or rhonchi   Heart:   s1 and s2 irregular; no rubs or gallops, no edema, + pedal pulses   Abdomen:  soft, non-distended, active bowel sounds, no hepatomegaly, no splenomegaly. Decreased tenderness over the left abdominal pacemaker, no overlying erythema or fluctuance   Genitourinary:  deferred   Extremities:   no clubbing, cyanosis; no joint effusions or swelling; muscle mass appropriate for age   Neurologic:  No gross focal sensory abnormalities; 5/5 muscle strength to upper extremities. 0/5 strength in lower extremities. Cranial nerves intact                        Skin:  Surgical scars abdomen, left neck well healed   Back:  minimal lumbar spine tenderness around L5, no paraspinal muscle guarding or rigidity, no CVA tenderness     Psychiatric:  No suicidal or homicidal ideations, appropriate mood and affect         Labs: Results:   Chemistry Recent Labs      04/24/17   0400   GLU  239*   NA  139   K  4.5   CL  106   CO2  29   BUN  50*   CREA  1.29   CA  8.5   AGAP  4   BUCR  39*      CBC w/Diff Recent Labs      04/24/17   0400   WBC  13.1   RBC  3.08*   HGB  7.9*   HCT  23.4*   PLT  195      Microbiology No results for input(s): CULT in the last 72 hours.        RADIOLOGY:    All available imaging studies/reports in [facility] for this admission were reviewed    Dr. Nicolle Stafford, Infectious Disease Specialist  [phone]  April 26, 2017  3:49 PM

## 2017-04-26 NOTE — PROGRESS NOTES
Dr. Fermín Cortes made aware of discontinue perez order on 4/24/17. Per Dr. Fermín Cortes, maintain perez catheter at this time and do not discontinue order.  Vesta Loyola

## 2017-04-26 NOTE — PROGRESS NOTES
Patient co pain in  upper chest  abdomen. No back pain. Feels it is from being moved recently  Also feeling cold with chills  I note patient WBC up to 20K again  Neurologic picture without change   AP  Concerned about  patient devolving into septic picture again. Last time I saw her with shaking chills she was septic. Will order blood cultures and redraw labs in AM.   Already on ABX and no evident source.    Will order rescan of abdomen to recheck psoas collecton

## 2017-04-26 NOTE — PROGRESS NOTES
TRANSFER - IN REPORT:    Verbal report received from DANIEL Rodriguez on Antoine Angulo  being received from ICU for routine progression of care      Report consisted of patients Situation, Background, Assessment and   Recommendations(SBAR). Information from the following report(s) SBAR was reviewed with the receiving nurse. Opportunity for questions and clarification was provided. Assessment completed upon patients arrival to unit and care assumed.

## 2017-04-27 LAB
ERYTHROCYTE [DISTWIDTH] IN BLOOD BY AUTOMATED COUNT: 15.8 % (ref 11.6–14.5)
GLUCOSE BLD STRIP.AUTO-MCNC: 138 MG/DL (ref 70–110)
GLUCOSE BLD STRIP.AUTO-MCNC: 181 MG/DL (ref 70–110)
GLUCOSE BLD STRIP.AUTO-MCNC: 189 MG/DL (ref 70–110)
GLUCOSE BLD STRIP.AUTO-MCNC: 211 MG/DL (ref 70–110)
HCT VFR BLD AUTO: 22.5 % (ref 35–45)
HCT VFR BLD AUTO: 22.6 % (ref 35–45)
HCT VFR BLD AUTO: 24.5 % (ref 35–45)
HGB BLD-MCNC: 7.5 G/DL (ref 12–16)
HGB BLD-MCNC: 7.5 G/DL (ref 12–16)
HGB BLD-MCNC: 8.2 G/DL (ref 12–16)
MCH RBC QN AUTO: 25.6 PG (ref 24–34)
MCHC RBC AUTO-ENTMCNC: 33.3 G/DL (ref 31–37)
MCV RBC AUTO: 76.8 FL (ref 74–97)
PLATELET # BLD AUTO: 112 K/UL (ref 135–420)
PMV BLD AUTO: 10.2 FL (ref 9.2–11.8)
RBC # BLD AUTO: 2.93 M/UL (ref 4.2–5.3)
WBC # BLD AUTO: 11.2 K/UL (ref 4.6–13.2)

## 2017-04-27 PROCEDURE — 82962 GLUCOSE BLOOD TEST: CPT

## 2017-04-27 PROCEDURE — 85018 HEMOGLOBIN: CPT | Performed by: INTERNAL MEDICINE

## 2017-04-27 PROCEDURE — 74011636637 HC RX REV CODE- 636/637: Performed by: INTERNAL MEDICINE

## 2017-04-27 PROCEDURE — 86920 COMPATIBILITY TEST SPIN: CPT | Performed by: INTERNAL MEDICINE

## 2017-04-27 PROCEDURE — 74011250637 HC RX REV CODE- 250/637: Performed by: INTERNAL MEDICINE

## 2017-04-27 PROCEDURE — 74011250637 HC RX REV CODE- 250/637: Performed by: HOSPITALIST

## 2017-04-27 PROCEDURE — 36430 TRANSFUSION BLD/BLD COMPNT: CPT

## 2017-04-27 PROCEDURE — A6209 FOAM DRSG <=16 SQ IN W/O BDR: HCPCS

## 2017-04-27 PROCEDURE — 65660000000 HC RM CCU STEPDOWN

## 2017-04-27 PROCEDURE — 74011000258 HC RX REV CODE- 258: Performed by: INTERNAL MEDICINE

## 2017-04-27 PROCEDURE — 97110 THERAPEUTIC EXERCISES: CPT

## 2017-04-27 PROCEDURE — 77030005538 HC CATH URETH FOL44 BARD -B

## 2017-04-27 PROCEDURE — 97530 THERAPEUTIC ACTIVITIES: CPT

## 2017-04-27 PROCEDURE — 74011250636 HC RX REV CODE- 250/636: Performed by: INTERNAL MEDICINE

## 2017-04-27 PROCEDURE — 86900 BLOOD TYPING SEROLOGIC ABO: CPT | Performed by: INTERNAL MEDICINE

## 2017-04-27 PROCEDURE — P9016 RBC LEUKOCYTES REDUCED: HCPCS | Performed by: INTERNAL MEDICINE

## 2017-04-27 PROCEDURE — 85027 COMPLETE CBC AUTOMATED: CPT | Performed by: ORTHOPAEDIC SURGERY

## 2017-04-27 PROCEDURE — 30233N1 TRANSFUSION OF NONAUTOLOGOUS RED BLOOD CELLS INTO PERIPHERAL VEIN, PERCUTANEOUS APPROACH: ICD-10-PCS | Performed by: INTERNAL MEDICINE

## 2017-04-27 RX ORDER — SODIUM CHLORIDE 9 MG/ML
250 INJECTION, SOLUTION INTRAVENOUS AS NEEDED
Status: DISCONTINUED | OUTPATIENT
Start: 2017-04-27 | End: 2017-05-25 | Stop reason: HOSPADM

## 2017-04-27 RX ADMIN — PRAVASTATIN SODIUM 40 MG: 20 TABLET ORAL at 22:02

## 2017-04-27 RX ADMIN — ACETAMINOPHEN 500 MG: 500 TABLET, COATED ORAL at 04:08

## 2017-04-27 RX ADMIN — GABAPENTIN 400 MG: 400 CAPSULE ORAL at 17:30

## 2017-04-27 RX ADMIN — FAMOTIDINE 20 MG: 20 TABLET ORAL at 17:30

## 2017-04-27 RX ADMIN — POLYETHYLENE GLYCOL 3350 17 G: 17 POWDER, FOR SOLUTION ORAL at 07:48

## 2017-04-27 RX ADMIN — INSULIN LISPRO 3 UNITS: 100 INJECTION, SOLUTION INTRAVENOUS; SUBCUTANEOUS at 17:30

## 2017-04-27 RX ADMIN — CEFTRIAXONE SODIUM 2 G: 2 INJECTION, POWDER, FOR SOLUTION INTRAMUSCULAR; INTRAVENOUS at 10:36

## 2017-04-27 RX ADMIN — FOLIC ACID 1 MG: 1 TABLET ORAL at 08:06

## 2017-04-27 RX ADMIN — CLOTRIMAZOLE 10 MG: 10 LOZENGE ORAL at 22:02

## 2017-04-27 RX ADMIN — ALLOPURINOL 100 MG: 100 TABLET ORAL at 08:04

## 2017-04-27 RX ADMIN — INSULIN GLARGINE 20 UNITS: 100 INJECTION, SOLUTION SUBCUTANEOUS at 22:02

## 2017-04-27 RX ADMIN — INSULIN GLARGINE 20 UNITS: 100 INJECTION, SOLUTION SUBCUTANEOUS at 08:05

## 2017-04-27 RX ADMIN — CHOLECALCIFEROL TAB 25 MCG (1000 UNIT) 2000 UNITS: 25 TAB at 08:04

## 2017-04-27 RX ADMIN — CLOTRIMAZOLE 10 MG: 10 LOZENGE ORAL at 07:39

## 2017-04-27 RX ADMIN — Medication 2 TABLET: at 17:30

## 2017-04-27 RX ADMIN — DOCUSATE SODIUM 100 MG: 100 CAPSULE, LIQUID FILLED ORAL at 08:04

## 2017-04-27 RX ADMIN — OXYCODONE HYDROCHLORIDE AND ACETAMINOPHEN 1 TABLET: 7.5; 325 TABLET ORAL at 13:20

## 2017-04-27 RX ADMIN — CLOTRIMAZOLE 10 MG: 10 LOZENGE ORAL at 17:30

## 2017-04-27 RX ADMIN — FAMOTIDINE 20 MG: 20 TABLET ORAL at 08:14

## 2017-04-27 RX ADMIN — INSULIN LISPRO 6 UNITS: 100 INJECTION, SOLUTION INTRAVENOUS; SUBCUTANEOUS at 11:28

## 2017-04-27 RX ADMIN — CLOTRIMAZOLE 10 MG: 10 LOZENGE ORAL at 10:40

## 2017-04-27 RX ADMIN — CLOTRIMAZOLE 10 MG: 10 LOZENGE ORAL at 13:25

## 2017-04-27 RX ADMIN — GABAPENTIN 400 MG: 400 CAPSULE ORAL at 08:04

## 2017-04-27 NOTE — PROGRESS NOTES
1430: Patient unable to void since perez catheter removal at 1045. Purewick external catheter has been in place since removal. Patient trying to void but cannot pass urine. Bladder scan volume shows >999. Dr. Zack Weber paged. 1434: Dr. Zack Weber returned page. New order to place perez catheter due to urinary retention.    Cliff Nava

## 2017-04-27 NOTE — PROGRESS NOTES
Infectious Disease Progress Note    Requested by: Dr. Darian Nam, dr. Arzella Merlin    Reason: sepsis, acute paraplegia    Current abx Prior abx   Ceftriaxone since 4/24 Cefepime 4/20-4/21  vancomycin  4/20-4/24  Gentamicin 4/21-4/24  Metronidazole  4/20-4/24     Lines:   PICC RUE 4/26    Assessment :    77 y.o., right handed female, with an established history of hypertension, complete heart block with permanent pacemaker placed, diabetes mellitus, diabetic peripheral neuropathy, obesity, admitted to [facility] on 4/20/17. Clinical presentation consistent with  Sepsis (POA)  secondary to group B streptococcus bloodstream infection (positive blood cultures 4/20, negative blood cultures 4/21). Most likely source of bloodstream infection is infected right psoas hematoma/abscess. 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 No signs of neurological recovery on today's exam     2 week h/o pain at the site of abdominal pacemaker, tenderness at the site - however, patient doesn't have erythema at the site of pacemaker. No fluid collection noted at pacemaker pocket site per usg 4/24. Quick clearance of bacteremia would argue against endocarditis/endovascular infection. At this time risk of removal of pacemaker/general pocket change exceed the benefit. Discussed with cardiology. Would recommend close monitoring. Abdominal usg 4/24 doesn't reveal evidence of abscess/fluid collection at the site of pacemaker pocket. Clinically better. Had some chills this am. Unfortunately need to reinsert perez since she had urinary retention. No output from purewick. Recommendations:    1. cont ceftriaxone till 6/4/17  2. F/u blood cultures  3. F/u clinically    Above plan was discussed in details with patient, rn. . Please call me if any further questions or concerns.  Will continue to participate in the care of this patient. subjective:    Had chills this am. Denies cp, sob. Unable to move legs. decreased pain at the site of pacemaker left abdomen. No nausea, vomiting. No new rash/itching/joint pain/back pain. Home Medication List    Details   gabapentin (NEURONTIN) 400 mg capsule Take 1 Cap by mouth two (2) times a day. Indications: NEUROPATHIC PAIN  Qty: 30 Cap, Refills: 0    Associated Diagnoses: Iliopsoas muscle hematoma, right, subsequent encounter      cholecalciferol (VITAMIN D3) 1,000 unit tablet Take 2 Tabs by mouth daily. Indications: PREVENTION OF VITAMIN D DEFICIENCY  Qty: 30 Tab, Refills: 0      SITagliptin (JANUVIA) 50 mg tablet Take 50 mg by mouth daily. Indications: type 2 diabetes mellitus      potassium chloride (KLOR-CON M20) 20 mEq tablet Take 20 mEq by mouth two (2) times a day. Indications: HYPOKALEMIA PREVENTION      ondansetron (ZOFRAN ODT) 4 mg disintegrating tablet Take 4 mg by mouth every eight (8) hours as needed for Nausea. cyclobenzaprine (FLEXERIL) 10 mg tablet Take 10 mg by mouth as needed for Muscle Spasm(s). Indications: MUSCLE SPASM      carvedilol (COREG) 6.25 mg tablet Take 1 Tab by mouth two (2) times daily (with meals). Indications: hypertension  Qty: 30 Tab, Refills: 0    Associated Diagnoses: Benign hypertensive heart disease with systolic CHF, NYHA class 2 (HCC)      acetaminophen (TYLENOL) 325 mg tablet Take 2 Tabs by mouth every four (4) hours as needed (for fever or pain level less than 5/10). Indications: Fever, Pain  Qty: 30 Tab, Refills: 0    Associated Diagnoses: Iliopsoas muscle hematoma, right, subsequent encounter      allopurinol (ZYLOPRIM) 100 mg tablet Take 1 Tab by mouth daily. Indications: GOUT  Qty: 15 Tab, Refills: 0    Associated Diagnoses: Chronic gout, unspecified cause, unspecified site      insulin glargine (LANTUS) 100 unit/mL injection 15 Units by SubCUTAneous route nightly.  Indications: type 2 diabetes mellitus  Qty: 1 Vial, Refills: 0    Associated Diagnoses: Type 2 diabetes mellitus with diabetic neuropathy, with long-term current use of insulin (HCC)      docusate sodium (COLACE) 100 mg capsule Take 1 Cap by mouth daily (after breakfast). Indications: Constipation  Qty: 15 Cap, Refills: 0      senna-docusate (PERICOLACE) 8.6-50 mg per tablet Take 2 Tabs by mouth daily (after dinner). Indications: Constipation  Qty: 30 Tab, Refills: 0      oxyCODONE-acetaminophen (PERCOCET 7.5) 7.5-325 mg per tablet Take 1 Tab by mouth every four (4) hours as needed (for pain level greater than 4/10). Max Daily Amount: 6 Tabs. Indications: Pain  Qty: 50 Tab, Refills: 0    Associated Diagnoses: Iliopsoas muscle hematoma, right, subsequent encounter      bumetanide (BUMEX) 1 mg tablet TAKE 1 TABLET TWICE A DAY  Qty: 180 Tab, Refills: 1      pravastatin (PRAVACHOL) 40 mg tablet Take 40 mg by mouth nightly. Indications: DYSLIPIDEMIA      ferrous sulfate 325 mg (65 mg iron) tablet Take 1 Tab by mouth two (2) times daily (with meals).   Qty: 30 Tab, Refills: 0      insulin aspart (NOVOLOG) 100 unit/mL injection INITIATE INSULIN CORRECTIVE PROTOCOL (HR): Normal Insulin Sensitivity  For Blood Sugar (mg/dL) of:    Less than 150 =   0 units          150 -199 =   2 units 200 -249 =   4 units 250 -299 =   6 units 300 -349 =   8 units 350 and above =   10 units If 2 glucose readings are above 200 mg/dL  Qty: 10 mL, Refills: 6             Current Facility-Administered Medications   Medication Dose Route Frequency    famotidine (PEPCID) tablet 20 mg  20 mg Oral BID    cefTRIAXone (ROCEPHIN) 2 g in 0.9% sodium chloride (MBP/ADV) 50 mL MBP  2 g IntraVENous Q24H    insulin glargine (LANTUS) injection 20 Units  20 Units SubCUTAneous DAILY    diphenhydrAMINE (BENADRYL) capsule 50 mg  50 mg Oral Q4H PRN    polyethylene glycol (MIRALAX) packet 17 g  17 g Oral TID    insulin glargine (LANTUS) injection 20 Units  20 Units SubCUTAneous QHS    cholecalciferol (VITAMIN D3) tablet 2,000 Units  2,000 Units Oral DAILY    docusate sodium (COLACE) capsule 100 mg  100 mg Oral DAILY AFTER BREAKFAST    gabapentin (NEURONTIN) capsule 400 mg  400 mg Oral BID    oxyCODONE-acetaminophen (PERCOCET 7.5) 7.5-325 mg per tablet 1 Tab  1 Tab Oral Q4H PRN    pravastatin (PRAVACHOL) tablet 40 mg  40 mg Oral QHS    senna-docusate (PERICOLACE) 8.6-50 mg per tablet 2 Tab  2 Tab Oral PCD    acetaminophen (TYLENOL) tablet 500 mg  500 mg Oral Q6H PRN    allopurinol (ZYLOPRIM) tablet 100 mg  100 mg Oral DAILY    clotrimazole (MYCELEX) frances 10 mg  10 mg Oral 5XD    folic acid (FOLVITE) tablet 1 mg  1 mg Oral DAILY    ondansetron (ZOFRAN) injection 4 mg  4 mg IntraVENous Q8H PRN    insulin lispro (HUMALOG) injection   SubCUTAneous Q6H    glucose chewable tablet 16 g  16 g Oral PRN    glucagon (GLUCAGEN) injection 1 mg  1 mg IntraMUSCular PRN    dextrose (D50W) injection syrg 12.5-25 g  25-50 mL IntraVENous PRN       Allergies: Vancomycin; Ampicillin; Bactrim [sulfamethoxazole-trimethoprim]; Blueberry; Ciprofloxacin; Codeine; Crestor [rosuvastatin]; Darvocet a500 [propoxyphene n-acetaminophen]; Demerol [meperidine]; Levaquin [levofloxacin]; Lipitor [atorvastatin]; Magnesium oxide; Minocin [minocycline]; Pcn [penicillins]; Pravachol [pravastatin]; Sulfa (sulfonamide antibiotics); Ultracet [tramadol-acetaminophen]; Vicodin [hydrocodone-acetaminophen]; Vytorin 10-10 [ezetimibe-simvastatin]; and Percodan [oxycodone hcl-oxycodone-asa]    Temp (24hrs), Av °F (37.2 °C), Min:98.1 °F (36.7 °C), Max:100.3 °F (37.9 °C)    Visit Vitals    BP 99/67 (BP 1 Location: Left arm, BP Patient Position: Sitting)    Pulse 88    Temp 98.3 °F (36.8 °C)    Resp 22    Wt 110.6 kg (243 lb 12.8 oz)    SpO2 97%    Breastfeeding No    BMI 38.18 kg/m2       ROS: patient unable to communicate fluently. Detailed ros not feasible.     Physical Exam:    General: Well developed, well nourished female laying on the bed AAOx3 NAD    General:   awake alert and oriented   HEENT:  Normocephalic, atraumatic, PERRL, EOMI, no scleral icterus or pallor; no conjunctival hemmohage;  nasal and oral mucous are moist and without evidence of lesions. Neck supple, no bruits. Lymph Nodes:   no cervical, axillary or inguinal adenopathy   Lungs:   non-labored, bilaterally clear to auscultation- no crackles wheezes rales or rhonchi   Heart:   s1 and s2 irregular; no rubs or gallops, no edema, + pedal pulses   Abdomen:  soft, non-distended, active bowel sounds, no hepatomegaly, no splenomegaly. Decreased tenderness over the left abdominal pacemaker, no overlying erythema or fluctuance   Genitourinary:  deferred   Extremities:   no clubbing, cyanosis; no joint effusions or swelling; muscle mass appropriate for age   Neurologic:  No gross focal sensory abnormalities; 5/5 muscle strength to upper extremities. 0/5 strength in lower extremities.   Cranial nerves intact                        Skin:  Surgical scars abdomen, left neck well healed   Back:  minimal lumbar spine tenderness around L5, no paraspinal muscle guarding or rigidity, no CVA tenderness     Psychiatric:  No suicidal or homicidal ideations, appropriate mood and affect         Labs: Results:   Chemistry Recent Labs      04/26/17   1010   GLU  201*   NA  138   K  4.3   CL  102   CO2  28   BUN  34*   CREA  1.43*   CA  8.2*   AGAP  8   BUCR  24*      CBC w/Diff Recent Labs      04/27/17   0435  04/26/17   1010   WBC  11.2  20.6*   RBC  2.93*  3.39*   HGB  7.5*  8.7*   HCT  22.5*  26.4*   PLT  112*  110*   GRANS   --   85*   LYMPH   --   7*   EOS   --   1      Microbiology Recent Labs      04/26/17   2100   CULT  NO GROWTH AFTER 10 HOURS          RADIOLOGY:    All available imaging studies/reports in [facility] for this admission were reviewed    Dr. Lacie Barney, Infectious Disease Specialist  [phone]  April 27, 2017  3:49 PM

## 2017-04-27 NOTE — PROGRESS NOTES
Patient with home CPAP at bedside. Call made to [facility] for inspection, confirmation [id number].  Darell Sandifer

## 2017-04-27 NOTE — PROGRESS NOTES
[facility] Hospitalist Group  Progress Note    Patient: Fernando Arnold Age: 77 y.o. : 1950 MR#: [id number] SSN: [id number]  Date/Time: 2017     Subjective:     Feels better   Some sensation in left legs / knee pains she can feel   Weakness present both Lower limbs     Assessment/Plan:    77 yr old Female with PMH of iliopsoas hematoma , patient developed sudden onset of Right  leg weakness , patient evaluated by rehab MD and was transferred to medical service for further management .  Dr Davis Moreno - spine surgery consulted , neurology consulted , patient is admitted to ICU        1 Right Leg weakness   - Psoas infected hematoma   - continues to have neurological deficit , she experienced burning sensations in left calf   - RPT CT no new changes - follow up with Spine surgery     2 CMO - LBBB, - followed by cardiology     3 DM2  - Her po intake is unpredictable  - continue accu checks q 6 hrs with coverage    4 H/O DVT off  AC - due to Bleed     5 Elevated trop - From stress , S/B cardi - Continue current treatments     6 Acute blood loss anemia   - Monitor H/H , BT if h/h < 7   - BT ordered     7 UTI- POA   - Continue current antibiotics   - Follow urine culture       D/w patient and her family in room with her   On discharge they would like to go yris       Case discussed with:  [x]Patient  [x]Family  [x]Nursing  []Case Management  DVT Prophylaxis:  []Lovenox  []Hep SQ  [x]SCDs  []Coumadin   []On Heparin gtt    Patient Active Problem List   Diagnosis Code    Nonischemic cardiomyopathy ([facility] I42.8    History of complete heart block Z86.79    Biventricular implantable cardioverter-defibrillator in situ Z95.810    Left bundle branch block (LBBB) on electrocardiogram I44.7    Type 2 diabetes mellitus with diabetic neuropathy (HCC) E11.40    Dyslipidemia E78.5    Diabetic neuropathy associated with type 2 diabetes mellitus ([facility] E11.40    Obstructive sleep apnea on CPAP G47.33, Z99.89    AICD generator infection ([facility] T82. 7XXA    Difficult airway for intubation T88. 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 1XXA    Impaired mobility and ADLs Z74.09    History of Coumadin therapy Z79.01    Gout M10.9    Acute paraplegia ([facility]) G82.20    Sepsis ([facility] A41.9    Psoas abscess, right ([facility] K68.12    Krueger catheter in place on admission Z96.0    Urinary tract infection due to Enterococcus N39.0, B95.2    Group B streptococcal infection A49.1       Objective:   VS:   Visit Vitals    BP 99/67 (BP 1 Location: Left arm, BP Patient Position: Sitting)    Pulse 88    Temp 98.3 °F (36.8 °C)    Resp 22    Wt 110.6 kg (243 lb 12.8 oz)    SpO2 97%    Breastfeeding No    BMI 38.18 kg/m2      Tmax/24hrs: Temp (24hrs), Av °F (37.2 °C), Min:98.1 °F (36.7 °C), Max:100.3 °F (37.9 °C)  IOBRIEF    Intake/Output Summary (Last 24 hours) at 17 1536  Last data filed at 17 1002   Gross per 24 hour   Intake              420 ml   Output              950 ml   Net             -530 ml       General:  Ill appearing   HEENT:  NC, Atraumatic. PERRLA, anicteric sclerae. Pulmonary:  CTA Bilaterally. No Wheezing/Rhonchi/Rales. Cardiovascular: Regular rate and Rhythm. GI:  Soft, Non distended, Non tender. + Bowel sounds. Extremities:  No edema, cyanosis, clubbing. No calf tenderness. Psych:  Not anxious or agitated.   Neurologic: Grossly - Motor and Sensory functions are intact .  No Anxiety , calm , no Agitation         Medications:   Current Facility-Administered Medications   Medication Dose Route Frequency    0.9% sodium chloride infusion 250 mL  250 mL IntraVENous PRN    famotidine (PEPCID) tablet 20 mg  20 mg Oral BID    cefTRIAXone (ROCEPHIN) 2 g in 0.9% sodium chloride (MBP/ADV) 50 mL MBP  2 g IntraVENous Q24H    insulin glargine (LANTUS) injection 20 Units  20 Units SubCUTAneous DAILY    diphenhydrAMINE (BENADRYL) capsule 50 mg  50 mg Oral Q4H PRN    polyethylene glycol (MIRALAX) packet 17 g  17 g Oral TID    insulin glargine (LANTUS) injection 20 Units  20 Units SubCUTAneous QHS    cholecalciferol (VITAMIN D3) tablet 2,000 Units  2,000 Units Oral DAILY    docusate sodium (COLACE) capsule 100 mg  100 mg Oral DAILY AFTER BREAKFAST    gabapentin (NEURONTIN) capsule 400 mg  400 mg Oral BID    oxyCODONE-acetaminophen (PERCOCET 7.5) 7.5-325 mg per tablet 1 Tab  1 Tab Oral Q4H PRN    pravastatin (PRAVACHOL) tablet 40 mg  40 mg Oral QHS    senna-docusate (PERICOLACE) 8.6-50 mg per tablet 2 Tab  2 Tab Oral PCD    acetaminophen (TYLENOL) tablet 500 mg  500 mg Oral Q6H PRN    allopurinol (ZYLOPRIM) tablet 100 mg  100 mg Oral DAILY    clotrimazole (MYCELEX) frances 10 mg  10 mg Oral 5XD    folic acid (FOLVITE) tablet 1 mg  1 mg Oral DAILY    ondansetron (ZOFRAN) injection 4 mg  4 mg IntraVENous Q8H PRN    insulin lispro (HUMALOG) injection   SubCUTAneous Q6H    glucose chewable tablet 16 g  16 g Oral PRN    glucagon (GLUCAGEN) injection 1 mg  1 mg IntraMUSCular PRN    dextrose (D50W) injection syrg 12.5-25 g  25-50 mL IntraVENous PRN       Labs:    Recent Results (from the past 24 hour(s))   GLUCOSE, POC    Collection Time: 04/26/17  5:33 PM   Result Value Ref Range    Glucose (POC) 219 (H) 70 - 110 mg/dL   CULTURE, BLOOD    Collection Time: 04/26/17  9:00 PM   Result Value Ref Range    Special Requests: NO SPECIAL REQUESTS      Culture result: NO GROWTH AFTER 10 HOURS     GLUCOSE, POC    Collection Time: 04/26/17 11:09 PM   Result Value Ref Range    Glucose (POC) 152 (H) 70 - 110 mg/dL   GLUCOSE, POC    Collection Time: 04/26/17 11:22 PM   Result Value Ref Range    Glucose (POC) 137 (H) 70 - 110 mg/dL   CBC W/O DIFF    Collection Time: 04/27/17  4:35 AM   Result Value Ref Range    WBC 11.2 4.6 - 13.2 K/uL    RBC 2.93 (L) 4.20 - 5.30 M/uL    HGB 7.5 (L) 12.0 - 16.0 g/dL    HCT 22.5 (L) 35.0 - 45.0 %    MCV 76.8 74.0 - 97.0 FL    MCH 25.6 24.0 - 34.0 PG    MCHC 33.3 31.0 - 37.0 g/dL    RDW 15.8 (H) 11.6 - 14.5 %    PLATELET 241 (L) 263 - 420 K/uL    MPV 10.2 9.2 - 11.8 FL   GLUCOSE, POC    Collection Time: 04/27/17  5:49 AM   Result Value Ref Range    Glucose (POC) 138 (H) 70 - 110 mg/dL   GLUCOSE, POC    Collection Time: 04/27/17 11:25 AM   Result Value Ref Range    Glucose (POC) 211 (H) 70 - 110 mg/dL       Signed By: Jed Gordon MD     April 27, 2017

## 2017-04-27 NOTE — PROGRESS NOTES
[facility] - Progress Note  Admit Date: 4/20/2017     The patient was seen, examined, and independently evaluated and I agree with the below assessment and plan by [place] PA-C with the following comments. Unfortunate situation, but nothing to do at this point from CV but continue conservative management as outlined below. She did have some shaking chills this afternoon suggesting septicemia and if this persists may need to consider adjustment of antibiotics per primary service or ID. CBC today with worsening anemia with H&H of 7.5/22.5 with WBC down to 11.2 and platelets slightly low at 112. Would consider repeat H&H and possible transfusion per primary service if Hct drops further. Discussed with Dr. Alexandro Howard.        Assessment:     Hospital Problems  Date Reviewed: 4/20/2017          Codes Class Noted POA    * (Principal)Acute paraplegia ([facility] ICD-10-CM: G82.20  ICD-9-CM: 344.1  4/20/2017 Yes        Sepsis ([facility] ICD-10-CM: A41.9  ICD-9-CM: 038.9, 995.91  4/20/2017 Yes        Psoas abscess, right ([facility] ICD-10-CM: I18.57  ICD-9-CM: 567.31  4/20/2017 Yes        Krueger catheter in place on admission ICD-10-CM: Z96.0  ICD-9-CM: V45.89  4/20/2017 Yes        Urinary tract infection due to Enterococcus ICD-10-CM: N39.0, B95.2  ICD-9-CM: 599.0, 041.04  4/20/2017 Yes        Group B streptococcal infection ICD-10-CM: A49.1  ICD-9-CM: 041.02  4/20/2017 Yes        History of Coumadin therapy ICD-10-CM: Z79.01  ICD-9-CM: V58.61  Unknown Yes    Overview Signed 4/6/2017 10:35 PM by Trey Correa MD     Anticoagulation for chronic atrial fibrillation; Discontinued on 3/30/2017             Decreased calculated glomerular filtration rate (GFR) ICD-10-CM: R94.4  ICD-9-CM: 794.4  3/30/2017 Yes    Overview Signed 4/6/2017  5:47 PM by Trey Correa MD     Calculated GFR equivalent to that of CKD stage 3 = 30-59 ml/min             Iliopsoas muscle hematoma ICD-10-CM: S70.10XA  ICD-9-CM: 924.00  3/30/2017 Yes Psoas hematoma, right, secondary to anticoagulant therapy ICD-10-CM: S30. 1XXA  ICD-9-CM: 924.9, E934.2  3/30/2017 Yes        Impaired mobility and ADLs ICD-10-CM: Z74.09  ICD-9-CM: 799.89  3/30/2017 Yes        Benign hypertensive heart disease with systolic CHF, NYHA class 2 (HCC) (Chronic) ICD-10-CM: I11.0, I50.20  ICD-9-CM: 402.11, 428.20, 428.0  9/5/2012 Yes        AICD generator infection ([facility] ICD-10-CM: T82. 7XXA  ICD-9-CM: 996.61  8/20/2012 Yes    Overview Signed 8/20/2012  6:13 PM by Karishma Rodriguez MD     S/p explant 4 leads 8/20/12             Type 2 diabetes mellitus with diabetic neuropathy (HCC) (Chronic) ICD-10-CM: E11.40  ICD-9-CM: 250.60, 357.2  6/28/2011 Yes                 -Pacemaker pocket pain, cannot exclude seeding from abscess. Limited options given device dependence and obstruction. Treating conservatively. -Sepsis secondary to infected right psoas hematoma/epidural abscess with neurological compromise resulting in paraplegia. Now s/p drain by IR.  -Heart block s/p AICD 2005 with upgrade to BiV later that year, removed however in 2012 due to trauma and infection. New biventricular pacemaker LUQ 9/2012 by Dr. Rosalind Adkins and revision 10/2012 due to Twiddler's syndrome. Pacemaker dependent. Normal function 3/2017. Patient does report discomfort left upper quadrant following fall last month. Right axillary vein obstruction by venogram 2012.   -Elevated troponin likely demand ischemia in setting of sepsis. -H/p transient nonischemic CMY with EF 45% (45-50% 6/2015 with patent coronaries on cath 1996). No ischemia on nuclear 6/2015.  -Acute kidney injury, improving  -Diabetes mellitus.  -Dyslipidemia. -H/o HTN. -LBBB. -HALI on CPAP. -H/o DVT.    Plan:     Leukocytosis improved, temp 100.3 noted this AM. But shaking chills noted during evaluation. CT yesterday overall improving. Continuing conservative management with aggressive antibiotics as outlined by ID.   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 Subjective:     Shaking chills after therapy. Abdominal fullness/discomfort today. Objective:      Patient Vitals for the past 8 hrs:   Temp Pulse Resp BP SpO2   04/27/17 1102 98.3 °F (36.8 °C) 88 22 99/67 97 %   04/27/17 0852 98.2 °F (36.8 °C) 74 20 109/64 100 %         Patient Vitals for the past 96 hrs:   Weight   04/27/17 0630 110.6 kg (243 lb 12.8 oz)   04/26/17 0737 111.1 kg (244 lb 14.9 oz)   04/25/17 0729 111.1 kg (244 lb 14.9 oz)   04/24/17 0800 116.4 kg (256 lb 9.9 oz)                    Intake/Output Summary (Last 24 hours) at 04/27/17 1359  Last data filed at 04/27/17 1002   Gross per 24 hour   Intake              480 ml   Output              950 ml   Net             -470 ml       Physical Exam:  General:  Fatigued  Neck:  no JVD  Lungs:  clear to auscultation bilaterally  Heart:  regular rate and rhythm  Abdomen:  abdomen is soft mildly tender  Extremities:  extremities normal, atraumatic, no cyanosis or edema    Data Review:     Labs: Results:       Chemistry Recent Labs      04/26/17   1010   GLU  201*   NA  138   K  4.3   CL  102   CO2  28   BUN  34*   CREA  1.43*   CA  8.2*   AGAP  8   BUCR  24*      CBC w/Diff Recent Labs      04/27/17   0435  04/26/17   1010   WBC  11.2  20.6*   RBC  2.93*  3.39*   HGB  7.5*  8.7*   HCT  22.5*  26.4*   PLT  112*  110*   GRANS   --   85*   LYMPH   --   7*   EOS   --   1      Cardiac Enzymes No results found for: CPK, CKMMB, CKMB, RCK3, CKMBT, CKNDX, CKND1, CARLI, TROPT, TROIQ, AMBERLY, TROPT, TNIPOC, BNP, BNPP   Coagulation No results for input(s): PTP, INR, APTT in the last 72 hours.     No lab exists for component: INREXT, INREXT    Lipid Panel Lab Results   Component Value Date/Time    Cholesterol, total 154 07/24/2012 01:00 AM    HDL Cholesterol 48 07/24/2012 01:00 AM    LDL, calculated 90.4 07/24/2012 01:00 AM    VLDL, calculated 15.6 07/24/2012 01:00 AM    Triglyceride 78 07/24/2012 01:00 AM    CHOL/HDL Ratio 3.2 07/24/2012 01:00 AM      BNP Lab Results   Component Value Date/Time    B-type Natriuretic Peptide 111.9 11/19/2013 10:17 AM      Liver Enzymes No results for input(s): TP, ALB, TBIL, AP, SGOT, GPT in the last 72 hours.     No lab exists for component: DBIL   Digoxin    Thyroid Studies Lab Results   Component Value Date/Time    TSH 0.51 04/20/2017 07:26 PM          Signed By: Erica Brown DO     April 27, 2017

## 2017-04-27 NOTE — PROGRESS NOTES
vss afeb  Neuro without change  Chills resolved after BM  Ct without new mass- drain in place  Wbc now normal 11k    AP  Neuro status stable  Requires spinal cord rehab  Ongoing abx per ID  No evidence of spine pathology  Will sign off at this time  Please re-consult if issues arise.

## 2017-04-27 NOTE — ROUTINE PROCESS
Bedside shift change report given to Dennys Kam RN (oncoming nurse) by Alex Bunn RN (offgoing nurse). Report included the following information SBAR, ED Summary, MAR and Recent Results.

## 2017-04-27 NOTE — DIABETES MGMT
GLYCEMIC CONTROL PLAN OF CARE    Assessment/Recommendations:  Patient is 77year old with past medical history including type 2 diabetes mellitus, diabetic neuropathy, CHF, dyslipidemia, obstructive sleep apnea, complete heart block, AICD placement - was being prepared for discharge from rehab when patient develop sudden weakness and c/o of increasing burning sensation of the right leg. Patient was admitted to acute care on 04/20/2017. Noted:  Right leg weakness  Iliopsoas hematoma, infected. Type 2 diabetes mellitus with A1C of 8.0% (03/30/2017). POC BG range on 04/26/2017: 137-264 mg/dL. POC BG report on 04/27/2017 at time of review: 138 mg/dL. Patient verbalized understanding of continue BG monitoring and intervention. Recommendations: Continue current insulin orders: basal and correctional.    Most recent blood glucose values:    Results for Mine Mohan (MRN [id number]) as of 4/27/2017 12:08   Ref. Range 4/26/2017 08:46 4/26/2017 13:08 4/26/2017 17:33 4/26/2017 23:09 4/26/2017 23:22   GLUCOSE,FAST - POC Latest Ref Range: 70 - 110 mg/dL 221 (H) 264 (H) 219 (H) 152 (H) 137 (H)     Results for Mine Mohan (MRN [id number]) as of 4/27/2017 12:08   Ref. Range 4/27/2017 05:49 4/27/2017 11:25   GLUCOSE,FAST - POC Latest Ref Range: 70 - 110 mg/dL 138 (H) 211 (H)     Current A1C of 8.0% (03/30/2017) is equivalent to average blood glucose of 183 mg/dL during the past 2-3 months.      Current hospital diabetes medications:   Lantus insulin 20 units daily every morning and 20 units nightly. Correctional Lispro insulin ACHS. Very resistant dose.      Previous day's insulin requirements: 04/26/2017  Lantus insulin: 40 units  Lispro insulin: 22 units  TDD: 62 units of insulin      Home diabetes medications: Per educational notes, 03/31/2017:  Januvia 50 mg daily every morning.   Lantus insulin 15 units nightly   Novolog insulin per sliding scale      Diet: Diabetic Consistent Carbohydrate    Goals: Patient's blood glucose will be within target range on  mg/dL by 04/30/2017.     Education:  __x__Refer to Diabetes Education Record: (3/31/17) by RD, Claudeen Dilling            ____Education not indicated at this time      Azeem Lazo RN

## 2017-04-27 NOTE — PROGRESS NOTES
Bedside and Verbal shift change report given to Alejandro Moritz, RN by Selam Francis RN. Report included the following information SBAR, Kardex, Intake/Output and Recent Results.

## 2017-04-27 NOTE — PROGRESS NOTES
NUTRITION    Screen     RECOMMENDATIONS / PLAN:     - Update food preferences  - Add supplement: Glucerna Shake TID  - Continue RD inpatient monitoring and evaluation. NUTRITION INTERVENTIONS & DIAGNOSIS:     [x] Meals/Snacks: modified diet   [x] Medical food supplementation: add  [x] Vitamin/mineral supplementation: folic acid, vitamin D3    Nutrition Diagnosis: Inadequate energy intake related to decreased appetite as evidenced by decreased meal intake PTA and since admission    ASSESSMENT:     Subjective/Objective:  Patient reported appetite and meal intake are poor/fair. Discussed resuming nutrition supplements as previously received; pt agreed with plan. Discussed food preferences.  Encouraged po intake of meals and supplements     Average po intake adequate to meet patients estimated nutritional needs:   [] Yes     [x] No   [] Unable to determine at this time    Diet: DIET DIABETIC CONSISTENT CARB Regular      Food Allergies: blueberries  Current Appetite:   [] Good     [x] Fair     [x] Poor     [] Other:    Appetite/meal intake prior to admission:   [] Good     [] Fair     [x] Poor (since admission to [facility])    [] Other:  Feeding Limitations:  [] Swallowing difficulty    [] Chewing difficulty    [] Other:  Current Meal Intake:   Patient Vitals for the past 100 hrs:   % Diet Eaten   04/27/17 1002 25 %   04/26/17 1744 100 %   04/26/17 1430 75 %   04/25/17 1200 30 %   04/25/17 0800 25 %     BM: 4/27 - per pt report  Skin Integrity:  Callus on right foot   Edema:  1+ LEs  Pertinent Medications: Reviewed; colace, miralax, dulcolax      Recent Labs      04/26/17   1010   NA  138   K  4.3   CL  102   CO2  28   GLU  201*   BUN  34*   CREA  1.43*   CA  8.2*       Intake/Output Summary (Last 24 hours) at 04/27/17 1432  Last data filed at 04/27/17 1002   Gross per 24 hour   Intake              420 ml   Output              950 ml   Net             -530 ml       Anthropometrics:  Ht Readings from Last 1 Encounters: 04/06/17 5' 7\" (1.702 m)     Last 3 Recorded Weights in this Encounter    04/25/17 0729 04/26/17 0737 04/27/17 0630   Weight: 111.1 kg (244 lb 14.9 oz) 111.1 kg (244 lb 14.9 oz) 110.6 kg (243 lb 12.8 oz)     Body mass index is 38.18 kg/(m^2). Weight History:  Per chart review, patient reported usual body weight is 240 lbs. Weight Metrics 4/27/2017 4/20/2017 4/6/2017 4/6/2017 3/30/2017 3/16/2017 3/14/2017   Weight 243 lb 12.8 oz - 233 lb 227 lb 1.6 oz - 240 lb 243 lb   BMI - 38.18 kg/m2 36.49 kg/m2 - 35.57 kg/m2 37.59 kg/m2 38.06 kg/m2        Admitting Diagnosis: Acute paraplegia  Acute paraplegia [facility])  Past Medical History:   Diagnosis Date    Acute paraplegia ([facility] [address] 4/20/2017    Benign hypertensive heart disease with systolic CHF, NYHA class 2 ([facility] 9/5/2012    Biventricular implantable cardioverter-defibrillator in situ 04/28/2005    Upgraded to BiV AICD; gen change 4/2008; pocket revision 10/2009; Abdominal - done on 8/22/2012 by Dr. Chyna Velasco Cardiac cath 08/15/1996    Patent coronaries. Elev LVEDP. EF 50-55%.  Cardiac echocardiogram 06/23/2015    Ltd study. EF 45-50%. Mild, diffuse hypk. Severe apical hypk. No mass or thrombus was clearly identified, although imaging was suboptimal.      Cardiac nuclear imaging test 06/19/2015    Fixed distal apical, distal septal defect more likely due to RV pacing than prior infarct. No ischemia. EF 46%. RWMA c/w RV pacing. Nondiagnostic EKG on pharm stress test.      Cardiovascular lower extremity venous duplex 09/04/2012    Acute, non-occlusive DVT in CFV on right. No DVT on left. No superficial thrombosis bilaterally.  Cardiovascular upper extremity venous duplex 08/27/2012    DVT in axillary vein on left. Left subclavian was not visualized.     Chronic anemia 9/5/2012    Chronic systolic heart failure (HCC)     Decreased calculated glomerular filtration rate (GFR) 3/30/2017    Calculated GFR equivalent to that of CKD stage 3 = 30-59 ml/min    Diabetic neuropathy associated with type 2 diabetes mellitus ([facility] [address] 6/28/2011    Difficult airway for intubation 08/22/2012    see anesthesia airway note    Dyslipidemia 6/28/2011    Gout     History of complete heart block 6/28/2011    History of Coumadin therapy     Anticoagulation for DVT of the LUE; Discontinued on 3/30/2017    History of deep venous thrombosis 9/5/2012    Left upper extremity    History of pyelonephritis 3/30/2017    Left bundle branch block (LBBB) on electrocardiogram 6/28/2011    Nonischemic cardiomyopathy ([facility] [address] 6/28/2011    Obesity (BMI 35.0-39.9 without comorbidity) ([facility] [address] 3/13/2017    Obstructive sleep apnea on CPAP 2/7/2012    Psoas abscess, right ([facility] [address] 4/20/2017    Psoas hematoma, right, secondary to anticoagulant therapy 3/30/2017    Type 2 diabetes mellitus with diabetic neuropathy ([facility] [address] 6/28/2011       Education Needs:        [x] None identified  [] Identified - Not appropriate at this time  []  Identified and addressed - refer to education log  Learning Limitations:   [x] None identified  [] Identified    Cultural, Buddhist & ethnic food preferences:  [x] None identified    [] Identified and addressed     ESTIMATED NUTRITION NEEDS:     Calories: 8584-7337 kcal (MSJx1.2-1.3) based on  [x] Actual BW: 111 kg      [] IBW   CHO: 252-273 gm (50% kcal)   Protein:  gm (0.8-1 gm/kg) based on  [x] Actual BW      [] IBW   Fluid: 1 mL/kcal     MONITORING & EVALUATION:     Nutrition Goal(s):   1. Po intake of meals will meet >75% of patient estimated nutritional needs within the next 7 days.   Outcome:  [] Met/Ongoing    []  Not Met    [x] New/Initial Goal     Monitoring:   [x] Diet tolerance   [x] Meal intake   [x] Supplement intake   [] GI symptoms/ability to tolerate po diet   [] Respiratory status   [] Plan of care      Previous Recommendations (for follow-up assessments only):     []   Implemented       []   Not Implemented (RD to address)     [x] No Recommendation Made     Discharge Planning: diabetic, cardiac diet  [x] Participated in care planning, discharge planning, & interdisciplinary rounds as appropriate      Divine Fu RD   Pager: [phone]

## 2017-04-27 NOTE — PROGRESS NOTES
Problem: Mobility Impaired (Adult and Pediatric)  Goal: *Acute Goals and Plan of Care (Insert Text)  STGs to be addressed within 3 days:  1. Bed mobility: Supine to sit to supine CGA/SBA with HR for meals. 2. Activity tolerance: Tolerate EOB > 15 minutes for ADLs. 3. Transfers: SPT-->to chair max/mod A with LRAD for ADLs. LTGs to be addressed within 7 days:  1. Transfers: SB-->to chair/wc max/mod A for ADLs. 2. Activity tolerance: Tolerated > 1 hr in chair for change of position. 3. Patient Education: Independent with HEP for home safety. 4. Stairs: Up/Down 2 steps CGA with HR for home entry. PHYSICAL THERAPY TREATMENT     Patient: Nu Bueno (40 y.o. female)  Date: 4/27/2017  Diagnosis: Acute paraplegia  Acute paraplegia [facility]) Acute paraplegia [facility])       Precautions: Fall, Skin  Chart, physical therapy assessment, plan of care and goals were reviewed. ASSESSMENT:  Pt demo's good receptiveness to all education and techniques utilized to facilitate movement. Pt is in good spirits,however, motor control of LE's remains absent. Trace contractions felt in ham/glut of R LE with single limb press,however, dissipates with repetition. Pt is progressing toward all established goals. Progression toward goals:  [ ]      Improving appropriately and progressing toward goals  [X]      Improving slowly and progressing toward goals  [ ]      Not making progress toward goals and plan of care will be adjusted       PLAN:  Patient continues to benefit from skilled intervention to address the above impairments. Continue treatment per established plan of care. Discharge Recommendations:  Rehab and spinal cord injury Facility  Further Equipment Recommendations for Discharge:  N/A       SUBJECTIVE:   Patient stated I can feel all of that now.       OBJECTIVE DATA SUMMARY:   Critical Behavior:  Neurologic State: Alert  Orientation Level: Oriented X4  Cognition: Follows commands, Appropriate decision making  Safety/Judgement: Awareness of environment, Fall prevention  Functional Mobility Training:  Bed Mobility:  Rolling: Maximum assistance  Supine to Sit: Maximum assistance  Sit to Supine: Moderate assistance;Maximum assistance  Scooting: Total assistance;Assist x2   Utilized passive hook lying for programming of unilateral LE to assist with roll,however, oriented pt to passive momentum based para technique as well. Balance:  Sitting: Impaired; With support  Sitting - Static: Fair (occasional)  Sitting - Dynamic: Fair (occasional)  Standing:  (N/A)   Static sitting at EOB x's 2 1/2 min's with bilateral UE support. Therapeutic Exercises:   Supine: PROM all planes bilateral LE's   Visualization with manual resistive ROM single limb press to maximize/maintain neuronal patency. Pain:  Pt reports /10 pain or discomfort prior to treatment. Pt reports /10 pain or discomfort post treatment. Activity Tolerance:   fair  Please refer to the flowsheet for vital signs taken during this treatment.   After treatment:   [X] Patient left in no apparent distress sitting up in chair  [ ] Patient left in no apparent distress in bed  [X] Call bell left within reach  [ ] Nursing notified  [ ] Caregiver present  [ ] Bed alarm activated      Nury Freire PTA   Time Calculation: 33 mins

## 2017-04-28 LAB
ABO + RH BLD: NORMAL
BLD PROD TYP BPU: NORMAL
BLOOD GROUP ANTIBODIES SERPL: NORMAL
BPU ID: NORMAL
CROSSMATCH RESULT,%XM: NORMAL
GLUCOSE BLD STRIP.AUTO-MCNC: 111 MG/DL (ref 70–110)
GLUCOSE BLD STRIP.AUTO-MCNC: 145 MG/DL (ref 70–110)
GLUCOSE BLD STRIP.AUTO-MCNC: 156 MG/DL (ref 70–110)
GLUCOSE BLD STRIP.AUTO-MCNC: 170 MG/DL (ref 70–110)
GLUCOSE BLD STRIP.AUTO-MCNC: 92 MG/DL (ref 70–110)
HCT VFR BLD AUTO: 24 % (ref 35–45)
HGB BLD-MCNC: 8.1 G/DL (ref 12–16)
SPECIMEN EXP DATE BLD: NORMAL
STATUS OF UNIT,%ST: NORMAL
UNIT DIVISION, %UDIV: 0

## 2017-04-28 PROCEDURE — 74011250637 HC RX REV CODE- 250/637: Performed by: INTERNAL MEDICINE

## 2017-04-28 PROCEDURE — 74011250637 HC RX REV CODE- 250/637: Performed by: HOSPITALIST

## 2017-04-28 PROCEDURE — 74011000258 HC RX REV CODE- 258: Performed by: INTERNAL MEDICINE

## 2017-04-28 PROCEDURE — 97530 THERAPEUTIC ACTIVITIES: CPT

## 2017-04-28 PROCEDURE — 74011636637 HC RX REV CODE- 636/637: Performed by: INTERNAL MEDICINE

## 2017-04-28 PROCEDURE — 97110 THERAPEUTIC EXERCISES: CPT

## 2017-04-28 PROCEDURE — 82962 GLUCOSE BLOOD TEST: CPT

## 2017-04-28 PROCEDURE — 74011250636 HC RX REV CODE- 250/636: Performed by: INTERNAL MEDICINE

## 2017-04-28 PROCEDURE — 85018 HEMOGLOBIN: CPT | Performed by: INTERNAL MEDICINE

## 2017-04-28 PROCEDURE — 65660000000 HC RM CCU STEPDOWN

## 2017-04-28 RX ADMIN — INSULIN GLARGINE 20 UNITS: 100 INJECTION, SOLUTION SUBCUTANEOUS at 21:54

## 2017-04-28 RX ADMIN — OXYCODONE HYDROCHLORIDE AND ACETAMINOPHEN 1 TABLET: 7.5; 325 TABLET ORAL at 21:53

## 2017-04-28 RX ADMIN — CHOLECALCIFEROL TAB 25 MCG (1000 UNIT) 2000 UNITS: 25 TAB at 08:17

## 2017-04-28 RX ADMIN — Medication 2 TABLET: at 17:01

## 2017-04-28 RX ADMIN — GABAPENTIN 400 MG: 400 CAPSULE ORAL at 08:17

## 2017-04-28 RX ADMIN — FAMOTIDINE 20 MG: 20 TABLET ORAL at 08:17

## 2017-04-28 RX ADMIN — INSULIN GLARGINE 20 UNITS: 100 INJECTION, SOLUTION SUBCUTANEOUS at 08:19

## 2017-04-28 RX ADMIN — ALLOPURINOL 100 MG: 100 TABLET ORAL at 08:17

## 2017-04-28 RX ADMIN — DOCUSATE SODIUM 100 MG: 100 CAPSULE, LIQUID FILLED ORAL at 08:17

## 2017-04-28 RX ADMIN — CLOTRIMAZOLE 10 MG: 10 LOZENGE ORAL at 14:03

## 2017-04-28 RX ADMIN — FOLIC ACID 1 MG: 1 TABLET ORAL at 08:17

## 2017-04-28 RX ADMIN — CLOTRIMAZOLE 10 MG: 10 LOZENGE ORAL at 07:35

## 2017-04-28 RX ADMIN — CLOTRIMAZOLE 10 MG: 10 LOZENGE ORAL at 11:10

## 2017-04-28 RX ADMIN — GABAPENTIN 400 MG: 400 CAPSULE ORAL at 17:01

## 2017-04-28 RX ADMIN — CEFTRIAXONE SODIUM 2 G: 2 INJECTION, POWDER, FOR SOLUTION INTRAMUSCULAR; INTRAVENOUS at 11:10

## 2017-04-28 RX ADMIN — POLYETHYLENE GLYCOL 3350 17 G: 17 POWDER, FOR SOLUTION ORAL at 08:16

## 2017-04-28 RX ADMIN — OXYCODONE HYDROCHLORIDE AND ACETAMINOPHEN 1 TABLET: 7.5; 325 TABLET ORAL at 11:09

## 2017-04-28 RX ADMIN — PRAVASTATIN SODIUM 40 MG: 20 TABLET ORAL at 21:54

## 2017-04-28 RX ADMIN — INSULIN LISPRO 3 UNITS: 100 INJECTION, SOLUTION INTRAVENOUS; SUBCUTANEOUS at 11:14

## 2017-04-28 RX ADMIN — FAMOTIDINE 20 MG: 20 TABLET ORAL at 17:01

## 2017-04-28 RX ADMIN — CLOTRIMAZOLE 10 MG: 10 LOZENGE ORAL at 17:01

## 2017-04-28 RX ADMIN — CLOTRIMAZOLE 10 MG: 10 LOZENGE ORAL at 21:53

## 2017-04-28 NOTE — PROGRESS NOTES
Bedside and Verbal shift change report given to Irasema Mars RN (oncoming nurse) by Maggie Joyce RN (offgoing nurse). Report included the following information SBAR.

## 2017-04-28 NOTE — PROGRESS NOTES
Infectious Disease Progress Note    Requested by: Dr. Stephanie Jamison, dr. Lauren Morales    Reason: sepsis, acute paraplegia    Current abx Prior abx   Ceftriaxone since 4/24 Cefepime 4/20-4/21  vancomycin  4/20-4/24  Gentamicin 4/21-4/24  Metronidazole  4/20-4/24     Lines:   PICC RUE 4/26    Assessment :    77 y.o., right handed female, with an established history of hypertension, complete heart block with permanent pacemaker placed, diabetes mellitus, diabetic peripheral neuropathy, obesity, admitted to [facility] on 4/20/17. Clinical presentation consistent with  Sepsis (POA)  secondary to group B streptococcus bloodstream infection (positive blood cultures 4/20, negative blood cultures 4/21). Most likely source of bloodstream infection is infected right psoas hematoma/abscess. 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 No signs of neurological recovery on today's exam     2 week h/o pain at the site of abdominal pacemaker, tenderness at the site - however, patient doesn't have erythema at the site of pacemaker. No fluid collection noted at pacemaker pocket site per usg 4/24. Quick clearance of bacteremia would argue against endocarditis/endovascular infection. At this time risk of removal of pacemaker/general pocket change exceed the benefit. Discussed with cardiology. Would recommend close monitoring. Abdominal usg 4/24 doesn't reveal evidence of abscess/fluid collection at the site of pacemaker pocket. Clinically better. Recommendations:    1. cont ceftriaxone till 6/4/17  2. Recommend drain check by IR next week  3. D/c planning per primary team    Above plan was discussed in details with patient, rn. . Please call me if any further questions or concerns. Will continue to participate in the care of this patient. subjective:    Had chills this am. Denies cp, sob. Unable to move legs. decreased pain at the site of pacemaker left abdomen. No nausea, vomiting. No new rash/itching/joint pain/back pain. Home Medication List    Details   gabapentin (NEURONTIN) 400 mg capsule Take 1 Cap by mouth two (2) times a day. Indications: NEUROPATHIC PAIN  Qty: 30 Cap, Refills: 0    Associated Diagnoses: Iliopsoas muscle hematoma, right, subsequent encounter      cholecalciferol (VITAMIN D3) 1,000 unit tablet Take 2 Tabs by mouth daily. Indications: PREVENTION OF VITAMIN D DEFICIENCY  Qty: 30 Tab, Refills: 0      SITagliptin (JANUVIA) 50 mg tablet Take 50 mg by mouth daily. Indications: type 2 diabetes mellitus      potassium chloride (KLOR-CON M20) 20 mEq tablet Take 20 mEq by mouth two (2) times a day. Indications: HYPOKALEMIA PREVENTION      ondansetron (ZOFRAN ODT) 4 mg disintegrating tablet Take 4 mg by mouth every eight (8) hours as needed for Nausea. cyclobenzaprine (FLEXERIL) 10 mg tablet Take 10 mg by mouth as needed for Muscle Spasm(s). Indications: MUSCLE SPASM      carvedilol (COREG) 6.25 mg tablet Take 1 Tab by mouth two (2) times daily (with meals). Indications: hypertension  Qty: 30 Tab, Refills: 0    Associated Diagnoses: Benign hypertensive heart disease with systolic CHF, NYHA class 2 (HCC)      acetaminophen (TYLENOL) 325 mg tablet Take 2 Tabs by mouth every four (4) hours as needed (for fever or pain level less than 5/10). Indications: Fever, Pain  Qty: 30 Tab, Refills: 0    Associated Diagnoses: Iliopsoas muscle hematoma, right, subsequent encounter      allopurinol (ZYLOPRIM) 100 mg tablet Take 1 Tab by mouth daily. Indications: GOUT  Qty: 15 Tab, Refills: 0    Associated Diagnoses: Chronic gout, unspecified cause, unspecified site      insulin glargine (LANTUS) 100 unit/mL injection 15 Units by SubCUTAneous route nightly.  Indications: type 2 diabetes mellitus  Qty: 1 Vial, Refills: 0    Associated Diagnoses: Type 2 diabetes mellitus with diabetic neuropathy, with long-term current use of insulin (HCC)      docusate sodium (COLACE) 100 mg capsule Take 1 Cap by mouth daily (after breakfast). Indications: Constipation  Qty: 15 Cap, Refills: 0      senna-docusate (PERICOLACE) 8.6-50 mg per tablet Take 2 Tabs by mouth daily (after dinner). Indications: Constipation  Qty: 30 Tab, Refills: 0      oxyCODONE-acetaminophen (PERCOCET 7.5) 7.5-325 mg per tablet Take 1 Tab by mouth every four (4) hours as needed (for pain level greater than 4/10). Max Daily Amount: 6 Tabs. Indications: Pain  Qty: 50 Tab, Refills: 0    Associated Diagnoses: Iliopsoas muscle hematoma, right, subsequent encounter      bumetanide (BUMEX) 1 mg tablet TAKE 1 TABLET TWICE A DAY  Qty: 180 Tab, Refills: 1      pravastatin (PRAVACHOL) 40 mg tablet Take 40 mg by mouth nightly. Indications: DYSLIPIDEMIA      ferrous sulfate 325 mg (65 mg iron) tablet Take 1 Tab by mouth two (2) times daily (with meals).   Qty: 30 Tab, Refills: 0      insulin aspart (NOVOLOG) 100 unit/mL injection INITIATE INSULIN CORRECTIVE PROTOCOL (HR): Normal Insulin Sensitivity  For Blood Sugar (mg/dL) of:    Less than 150 =   0 units          150 -199 =   2 units 200 -249 =   4 units 250 -299 =   6 units 300 -349 =   8 units 350 and above =   10 units If 2 glucose readings are above 200 mg/dL  Qty: 10 mL, Refills: 6             Current Facility-Administered Medications   Medication Dose Route Frequency    0.9% sodium chloride infusion 250 mL  250 mL IntraVENous PRN    famotidine (PEPCID) tablet 20 mg  20 mg Oral BID    cefTRIAXone (ROCEPHIN) 2 g in 0.9% sodium chloride (MBP/ADV) 50 mL MBP  2 g IntraVENous Q24H    insulin glargine (LANTUS) injection 20 Units  20 Units SubCUTAneous DAILY    diphenhydrAMINE (BENADRYL) capsule 50 mg  50 mg Oral Q4H PRN    polyethylene glycol (MIRALAX) packet 17 g  17 g Oral TID    insulin glargine (LANTUS) injection 20 Units  20 Units SubCUTAneous QHS    cholecalciferol (VITAMIN D3) tablet 2,000 Units 2,000 Units Oral DAILY    docusate sodium (COLACE) capsule 100 mg  100 mg Oral DAILY AFTER BREAKFAST    gabapentin (NEURONTIN) capsule 400 mg  400 mg Oral BID    oxyCODONE-acetaminophen (PERCOCET 7.5) 7.5-325 mg per tablet 1 Tab  1 Tab Oral Q4H PRN    pravastatin (PRAVACHOL) tablet 40 mg  40 mg Oral QHS    senna-docusate (PERICOLACE) 8.6-50 mg per tablet 2 Tab  2 Tab Oral PCD    acetaminophen (TYLENOL) tablet 500 mg  500 mg Oral Q6H PRN    allopurinol (ZYLOPRIM) tablet 100 mg  100 mg Oral DAILY    clotrimazole (MYCELEX) frances 10 mg  10 mg Oral 5XD    folic acid (FOLVITE) tablet 1 mg  1 mg Oral DAILY    ondansetron (ZOFRAN) injection 4 mg  4 mg IntraVENous Q8H PRN    insulin lispro (HUMALOG) injection   SubCUTAneous Q6H    glucose chewable tablet 16 g  16 g Oral PRN    glucagon (GLUCAGEN) injection 1 mg  1 mg IntraMUSCular PRN    dextrose (D50W) injection syrg 12.5-25 g  25-50 mL IntraVENous PRN       Allergies: Vancomycin; Ampicillin; Bactrim [sulfamethoxazole-trimethoprim]; Blueberry; Ciprofloxacin; Codeine; Crestor [rosuvastatin]; Darvocet a500 [propoxyphene n-acetaminophen]; Demerol [meperidine]; Levaquin [levofloxacin]; Lipitor [atorvastatin]; Magnesium oxide; Minocin [minocycline]; Pcn [penicillins]; Pravachol [pravastatin]; Sulfa (sulfonamide antibiotics); Ultracet [tramadol-acetaminophen]; Vicodin [hydrocodone-acetaminophen]; Vytorin 10-10 [ezetimibe-simvastatin]; and Percodan [oxycodone hcl-oxycodone-asa]    Temp (24hrs), Av.3 °F (37.4 °C), Min:98.7 °F (37.1 °C), Max:99.7 °F (37.6 °C)    Visit Vitals    /63 (BP 1 Location: Left arm)    Pulse 81    Temp 99.3 °F (37.4 °C)    Resp 16    Wt 116.1 kg (256 lb)    SpO2 95%    Breastfeeding No    BMI 40.1 kg/m2       ROS: patient unable to communicate fluently. Detailed ros not feasible.     Physical Exam:    General: Well developed, well nourished female laying on the bed AAOx3 NAD    General:   awake alert and oriented   HEENT: Normocephalic, atraumatic, PERRL, EOMI, no scleral icterus or pallor; no conjunctival hemmohage;  nasal and oral mucous are moist and without evidence of lesions. Neck supple, no bruits. Lymph Nodes:   no cervical, axillary or inguinal adenopathy   Lungs:   non-labored, bilaterally clear to auscultation- no crackles wheezes rales or rhonchi   Heart:   s1 and s2 irregular; no rubs or gallops, no edema, + pedal pulses   Abdomen:  soft, non-distended, active bowel sounds, no hepatomegaly, no splenomegaly. Decreased tenderness over the left abdominal pacemaker, no overlying erythema or fluctuance   Genitourinary:  deferred   Extremities:   no clubbing, cyanosis; no joint effusions or swelling; muscle mass appropriate for age   Neurologic:  No gross focal sensory abnormalities; 5/5 muscle strength to upper extremities. 0/5 strength in lower extremities.   Cranial nerves intact                        Skin:  Surgical scars abdomen, left neck well healed   Back:  minimal lumbar spine tenderness around L5, no paraspinal muscle guarding or rigidity, no CVA tenderness     Psychiatric:  No suicidal or homicidal ideations, appropriate mood and affect         Labs: Results:   Chemistry Recent Labs      04/26/17   1010   GLU  201*   NA  138   K  4.3   CL  102   CO2  28   BUN  34*   CREA  1.43*   CA  8.2*   AGAP  8   BUCR  24*      CBC w/Diff Recent Labs      04/28/17   0430  04/27/17   2140  04/27/17   1558  04/27/17   0435  04/26/17   1010   WBC   --    --    --   11.2  20.6*   RBC   --    --    --   2.93*  3.39*   HGB  8.1*  8.2*  7.5*  7.5*  8.7*   HCT  24.0*  24.5*  22.6*  22.5*  26.4*   PLT   --    --    --   112*  110*   GRANS   --    --    --    --   85*   LYMPH   --    --    --    --   7*   EOS   --    --    --    --   1      Microbiology Recent Labs      04/26/17   2100   CULT  NO GROWTH 2 DAYS          RADIOLOGY:    All available imaging studies/reports in [facility] care for this admission were reviewed    Dr. Mag Gonzalez Phillip Mckay, Infectious Disease Specialist  [phone]  April 28, 2017  3:49 PM

## 2017-04-28 NOTE — DIABETES MGMT
GLYCEMIC CONTROL PLAN OF CARE    Assessment/Recommendations:  Patient is 77year old with past medical history including type 2 diabetes mellitus, diabetic neuropathy, CHF, dyslipidemia, obstructive sleep apnea, complete heart block, AICD placement - was being prepared for discharge from rehab when patient develop sudden weakness and c/o of increasing burning sensation of the right leg. Patient was admitted to acute care on 04/20/2017. Noted:  Right leg weakness  Iliopsoas hematoma, infected. Type 2 diabetes mellitus with A1C of 8.0% (03/30/2017). POC BG range on 04/27/2017: 138-211 mg/dL. POC BG report on 04/28/2017 at time of review: 92, 156 mg/dL. Patient stated that he blood sugar readings better today. Recommendations: Continue current insulin orders: basal and correctional.    Most recent blood glucose values:    Results for Annie Villatoro (MRN [id number]) as of 4/28/2017 14:32   Ref. Range 4/27/2017 05:49 4/27/2017 11:25 4/27/2017 17:30 4/27/2017 22:00   GLUCOSE,FAST - POC Latest Ref Range: 70 - 110 mg/dL 138 (H) 211 (H) 181 (H) 189 (H)     Results for Annie Villatoro (MRN [id number]) as of 4/28/2017 14:32   Ref. Range 4/28/2017 00:45 4/28/2017 06:06 4/28/2017 11:13   GLUCOSE,FAST - POC Latest Ref Range: 70 - 110 mg/dL 145 (H) 92 156 (H)     Current A1C of 8.0% (03/30/2017) is equivalent to average blood glucose of 183 mg/dL during the past 2-3 months.      Current hospital diabetes medications:   Lantus insulin 20 units daily every morning and 20 units nightly. Correctional Lispro insulin ACHS. Very resistant dose.      Previous day's insulin requirements: 04/27/2017  Lantus insulin: 40 units  Lispro insulin: 9 units  TDD: 49 units of insulin      Home diabetes medications: Per educational notes, 03/31/2017:  Januvia 50 mg daily every morning.   Lantus insulin 15 units nightly   Novolog insulin per sliding scale      Diet: Diabetic Consistent Carbohydrate; regular; nutr suppl: glucerna shake with all meals. Goals: Patient's blood glucose will be within target range on  mg/dL by 05/01/2017.     Education:  __x__Refer to Diabetes Education Record: (3/31/17) by Shaquille WILLSON            ____Education not indicated at this time      Charleen Marvin RN

## 2017-04-28 NOTE — PROGRESS NOTES
[facility] - Progress Note  Admit Date: 4/20/2017    Assessment:     Hospital Problems  Date Reviewed: 4/20/2017          Codes Class Noted POA    * (Principal)Acute paraplegia ([facility] [address] ICD-10-CM: G82.20  ICD-9-CM: 344.1  4/20/2017 Yes        Sepsis ([facility] [address] ICD-10-CM: A41.9  ICD-9-CM: 038.9, 995.91  4/20/2017 Yes        Psoas abscess, right ([facility] [address] ICD-10-CM: M63.49  ICD-9-CM: 567.31  4/20/2017 Yes        Krueger catheter in place on admission ICD-10-CM: Z96.0  ICD-9-CM: V45.89  4/20/2017 Yes        Urinary tract infection due to Enterococcus ICD-10-CM: N39.0, B95.2  ICD-9-CM: 599.0, 041.04  4/20/2017 Yes        Group B streptococcal infection ICD-10-CM: A49.1  ICD-9-CM: 041.02  4/20/2017 Yes        History of Coumadin therapy ICD-10-CM: Z79.01  ICD-9-CM: V58.61  Unknown Yes    Overview Signed 4/6/2017 10:35 PM by Aly Christina MD     Anticoagulation for chronic atrial fibrillation; Discontinued on 3/30/2017             Decreased calculated glomerular filtration rate (GFR) ICD-10-CM: R94.4  ICD-9-CM: 794.4  3/30/2017 Yes    Overview Signed 4/6/2017  5:47 PM by Aly Christina MD     Calculated GFR equivalent to that of CKD stage 3 = 30-59 ml/min             Iliopsoas muscle hematoma ICD-10-CM: S70.10XA  ICD-9-CM: 924.00  3/30/2017 Yes        Psoas hematoma, right, secondary to anticoagulant therapy ICD-10-CM: S30. 1XXA  ICD-9-CM: 924.9, E934.2  3/30/2017 Yes        Impaired mobility and ADLs ICD-10-CM: Z74.09  ICD-9-CM: 799.89  3/30/2017 Yes        Benign hypertensive heart disease with systolic CHF, NYHA class 2 (HCC) (Chronic) ICD-10-CM: I11.0, I50.20  ICD-9-CM: 402.11, 428.20, 428.0  9/5/2012 Yes        [facility] generator [facility] ICD-10-CM: T82. 7XXA  ICD-9-CM: 996.61  8/20/2012 Yes    Overview Signed 8/20/2012  6:13 PM by Talita Mckay MD     S/p explant 4 leads 8/20/12             Type 2 diabetes mellitus with diabetic neuropathy (HCC) (Chronic) ICD-10-CM: E11.40  ICD-9-CM: 250.60, 357.2  6/28/2011 Yes                 -Pacemaker pocket pain, cannot exclude seeding from abscess. Limited options given device dependence and obstruction. Treating conservatively. -Sepsis secondary to infected right psoas hematoma/epidural abscess with neurological compromise resulting in paraplegia. Now s/p drain by IR.  -Heart block s/p AICD 2005 with upgrade to BiV later that year, removed however in 2012 due to trauma and infection. New biventricular pacemaker LUQ 9/2012 by Dr. 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 Normal function 3/2017. Patient does report discomfort left upper quadrant following fall last month. Right axillary vein obstruction by venogram 2012.   -Elevated troponin likely demand ischemia in setting of sepsis. -H/p transient nonischemic CMY with EF 45% (45-50% 6/2015 with patent coronaries on cath 1996). No ischemia on nuclear 6/2015.  -Acute kidney injury, improving  -Diabetes mellitus.  -Dyslipidemia. -H/o HTN. -LBBB. -HALI on CPAP. -H/o DVT. Plan:     Fever and WBC improved, blood cultures from 4/26 remains negative, continue abx therapy as outlined by ID. Continue to follow hgb and transfuse as needed. Continue conservative therapy and follow clinically. Subjective:     Has increased abdominal and back pain today. Has some sensation returning in lower extremities.      Objective:      Patient Vitals for the past 8 hrs:   Temp Pulse Resp BP SpO2   04/28/17 0815 99.5 °F (37.5 °C) 76 22 - 96 %   04/28/17 0400 98.7 °F (37.1 °C) 82 18 105/63 96 %         Patient Vitals for the past 96 hrs:   Weight   04/28/17 0522 116.1 kg (256 lb)   04/27/17 0630 110.6 kg (243 lb 12.8 oz)   04/26/17 0737 111.1 kg (244 lb 14.9 oz)   04/25/17 0729 111.1 kg (244 lb 14.9 oz)                    Intake/Output Summary (Last 24 hours) at 04/28/17 1111  Last data filed at 04/28/17 0828   Gross per 24 hour   Intake              410 ml   Output             2350 ml   Net            -1940 ml       Physical Exam:  General:  alert, cooperative, no distress, appears stated age  Neck:  no JVD  Lungs:  clear to auscultation bilaterally  Heart:  regular rate and rhythm 2/6 systolic murmur LSB  Abdomen:  abdomen is soft without significant tenderness, masses, organomegaly or guarding  Extremities:  extremities normal, atraumatic, no cyanosis or edema    Data Review:     Labs: Results:       Chemistry Recent Labs      04/26/17   1010   GLU  201*   NA  138   K  4.3   CL  102   CO2  28   BUN  34*   CREA  1.43*   CA  8.2*   AGAP  8   BUCR  24*      CBC w/Diff Recent Labs      04/28/17   0430  04/27/17   2140  04/27/17   1558  04/27/17   0435  04/26/17   1010   WBC   --    --    --   11.2  20.6*   RBC   --    --    --   2.93*  3.39*   HGB  8.1*  8.2*  7.5*  7.5*  8.7*   HCT  24.0*  24.5*  22.6*  22.5*  26.4*   PLT   --    --    --   112*  110*   GRANS   --    --    --    --   85*   LYMPH   --    --    --    --   7*   EOS   --    --    --    --   1      Cardiac Enzymes No results found for: CPK, CKMMB, CKMB, RCK3, CKMBT, CKNDX, CKND1, CARLI, TROPT, TROIQ, AMBERLY, TROPT, TNIPOC, BNP, BNPP   Coagulation No results for input(s): PTP, INR, APTT in the last 72 hours. No lab exists for component: INREXT    Lipid Panel Lab Results   Component Value Date/Time    Cholesterol, total 154 07/24/2012 01:00 AM    HDL Cholesterol 48 07/24/2012 01:00 AM    LDL, calculated 90.4 07/24/2012 01:00 AM    VLDL, calculated 15.6 07/24/2012 01:00 AM    Triglyceride 78 07/24/2012 01:00 AM    CHOL/HDL Ratio 3.2 07/24/2012 01:00 AM      BNP Lab Results   Component Value Date/Time    B-type Natriuretic Peptide 111.9 11/19/2013 10:17 AM      Liver Enzymes No results for input(s): TP, ALB, TBIL, AP, SGOT, GPT in the last 72 hours.     No lab exists for component: DBIL   Digoxin    Thyroid Studies Lab Results   Component Value Date/Time    TSH 0.51 04/20/2017 07:26 PM          Signed By: LUDMILA Gibbons     April 28, 2017

## 2017-04-28 NOTE — PROGRESS NOTES
Patient now reports having sensation to R leg from upper thigh to below knee. Sensation in L leg from upper thigh all the way to foot. Still unable to move lower extremities.  [facility]

## 2017-04-28 NOTE — PROGRESS NOTES
[facility] Hospitalist Group  Progress Note    Patient: Jerod Luo Age: 77 y.o. : 1950 MR#: [id number] SSN: [id number]  Date/Time: 2017     Subjective:     No chills   No SOB, No NVD  Feels same   No to little sensations in legs     Assessment/Plan:    77 yr old Female with PMH of iliopsoas hematoma , patient developed sudden onset of Right  leg weakness , patient evaluated by rehab MD and was transferred to medical service for further management .  Dr Louis Vela - spine surgery consulted , neurology consulted , patient is admitted to ICU        1 Right Leg weakness   - Psoas infected hematoma - ID has given plan of care   - continues to have neurological deficit , she experienced burning sensations in left calf   - RPT CT no new changes - follow up with Spine surgery     2 CMO - LBBB, - followed by cardiology     3 DM2  - Her po intake is unpredictable  - continue accu checks q 6 hrs with coverage    4 H/O DVT off  AC - due to Bleed     5 Elevated trop - From stress , S/B cardi - Continue current treatments     6 Acute blood loss anemia   - s/p BT one unit   - Post transfusion H/h remained stable       7 UTI- POA   - Continue current antibiotics   - Follow urine culture       D/w patient and her family in room with her   On discharge they would like to go yris       Case discussed with:  [x]Patient  [x]Family  [x]Nursing  []Case Management  DVT Prophylaxis:  []Lovenox  []Hep SQ  [x]SCDs  []Coumadin   []On Heparin gtt    Patient Active Problem List   Diagnosis Code    Nonischemic cardiomyopathy ([address] [facility] I42.8    History of complete heart block Z86.79    Biventricular implantable cardioverter-defibrillator in situ Z95.810    Left bundle branch block (LBBB) on electrocardiogram I44.7    Type 2 diabetes mellitus with diabetic neuropathy (HCC) E11.40    Dyslipidemia E78.5    Diabetic neuropathy associated with type 2 diabetes mellitus ([facility] E11.40    Obstructive sleep apnea on CPAP G47.33, Z99.89    AICD generator infection ([facility] T82. 7XXA    Difficult airway for intubation T88. 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 1XXA    Impaired mobility and ADLs Z74.09    History of Coumadin therapy Z79.01    Gout M10.9    Acute paraplegia ([facility]) G82.20    Sepsis ([facility] A41.9    Psoas abscess, right ([facility] K68.12    Krueger catheter in place on admission Z96.0    Urinary tract infection due to Enterococcus N39.0, B95.2    Group B streptococcal infection A49.1       Objective:   VS:   Visit Vitals    /63 (BP 1 Location: Left arm, BP Patient Position: At rest)    Pulse 76    Temp 98 °F (36.7 °C)    Resp 18    Wt 116.1 kg (256 lb)    SpO2 97%    Breastfeeding No    BMI 40.1 kg/m2      Tmax/24hrs: Temp (24hrs), Av.2 °F (37.3 °C), Min:98 °F (36.7 °C), Max:99.7 °F (37.6 °C)  IOBRIEF    Intake/Output Summary (Last 24 hours) at 17 1634  Last data filed at 17 0828   Gross per 24 hour   Intake              410 ml   Output             2350 ml   Net            -1940 ml       General:  Ill appearing   HEENT:  NC, Atraumatic. PERRLA, anicteric sclerae. Pulmonary:  CTA Bilaterally. No Wheezing/Rhonchi/Rales. Cardiovascular: Regular rate and Rhythm. GI:  Soft, Non distended, Non tender. + Bowel sounds. Extremities:  No edema, cyanosis, clubbing. No calf tenderness. Psych:  Not anxious or agitated.   Neurologic: Grossly - Motor and Sensory functions are intact .  No Anxiety , calm , no Agitation         Medications:   Current Facility-Administered Medications   Medication Dose Route Frequency    0.9% sodium chloride infusion 250 mL  250 mL IntraVENous PRN    famotidine (PEPCID) tablet 20 mg  20 mg Oral BID    cefTRIAXone (ROCEPHIN) 2 g in 0.9% sodium chloride (MBP/ADV) 50 mL MBP  2 g IntraVENous Q24H    insulin glargine (LANTUS) injection 20 Units  20 Units SubCUTAneous DAILY    diphenhydrAMINE (BENADRYL) capsule 50 mg  50 mg Oral Q4H PRN    polyethylene glycol (MIRALAX) packet 17 g  17 g Oral TID    insulin glargine (LANTUS) injection 20 Units  20 Units SubCUTAneous QHS    cholecalciferol (VITAMIN D3) tablet 2,000 Units  2,000 Units Oral DAILY    docusate sodium (COLACE) capsule 100 mg  100 mg Oral DAILY AFTER BREAKFAST    gabapentin (NEURONTIN) capsule 400 mg  400 mg Oral BID    oxyCODONE-acetaminophen (PERCOCET 7.5) 7.5-325 mg per tablet 1 Tab  1 Tab Oral Q4H PRN    pravastatin (PRAVACHOL) tablet 40 mg  40 mg Oral QHS    senna-docusate (PERICOLACE) 8.6-50 mg per tablet 2 Tab  2 Tab Oral PCD    acetaminophen (TYLENOL) tablet 500 mg  500 mg Oral Q6H PRN    allopurinol (ZYLOPRIM) tablet 100 mg  100 mg Oral DAILY    clotrimazole (MYCELEX) frances 10 mg  10 mg Oral 5XD    folic acid (FOLVITE) tablet 1 mg  1 mg Oral DAILY    ondansetron (ZOFRAN) injection 4 mg  4 mg IntraVENous Q8H PRN    insulin lispro (HUMALOG) injection   SubCUTAneous Q6H    glucose chewable tablet 16 g  16 g Oral PRN    glucagon (GLUCAGEN) injection 1 mg  1 mg IntraMUSCular PRN    dextrose (D50W) injection syrg 12.5-25 g  25-50 mL IntraVENous PRN       Labs:    Recent Results (from the past 24 hour(s))   GLUCOSE, POC    Collection Time: 04/27/17  5:30 PM   Result Value Ref Range    Glucose (POC) 181 (H) 70 - 110 mg/dL   HGB & HCT    Collection Time: 04/27/17  9:40 PM   Result Value Ref Range    HGB 8.2 (L) 12.0 - 16.0 g/dL    HCT 24.5 (L) 35.0 - 45.0 %   GLUCOSE, POC Collection Time: 04/27/17 10:00 PM   Result Value Ref Range    Glucose (POC) 189 (H) 70 - 110 mg/dL   GLUCOSE, POC    Collection Time: 04/28/17 12:45 AM   Result Value Ref Range    Glucose (POC) 145 (H) 70 - 110 mg/dL   HGB & HCT    Collection Time: 04/28/17  4:30 AM   Result Value Ref Range    HGB 8.1 (L) 12.0 - 16.0 g/dL    HCT 24.0 (L) 35.0 - 45.0 %   GLUCOSE, POC    Collection Time: 04/28/17  6:06 AM   Result Value Ref Range    Glucose (POC) 92 70 - 110 mg/dL   GLUCOSE, POC    Collection Time: 04/28/17 11:13 AM   Result Value Ref Range    Glucose (POC) 156 (H) 70 - 110 mg/dL       Signed By: Hansel Andrew MD     April 28, 2017

## 2017-04-28 NOTE — PROGRESS NOTES
Problem: Mobility Impaired (Adult and Pediatric)  Goal: *Acute Goals and Plan of Care (Insert Text)  STGs to be addressed within 3 days:  1. Bed mobility: Supine to sit to supine CGA/SBA with HR for meals. 2. Activity tolerance: Tolerate EOB > 15 minutes for ADLs. 3. Transfers: SPT-->to chair max/mod A with LRAD for ADLs. LTGs to be addressed within 7 days:  1. Transfers: SB-->to chair/wc max/mod A for ADLs. 2. Activity tolerance: Tolerated > 1 hr in chair for change of position. 3. Patient Education: Independent with HEP for home safety. 4. Stairs: Up/Down 2 steps CGA with HR for home entry. PHYSICAL THERAPY TREATMENT     Patient: Linda Gonsalez (72 y.o. female)  Date: 4/28/2017  Diagnosis: Acute paraplegia  Acute paraplegia [facility]) Acute paraplegia [facility])       Precautions: Fall, Skin  Chart, physical therapy assessment, plan of care and goals were reviewed. ASSESSMENT:  Pt sensation cont's to improve with reports of sensation throughout R and L LE from light touch,pain/temp, and proprioception, however, motor control is largely absent. Pt found again with positioning boots on for pressure relief/neutral positioning of the LE's,however, boots are without common accessory in form of multi-positional foam wedge designed to maintain hip neutrality. Pt would benefit greatly from this device and efforts will be made to locate. Pt oriented to momentum based bilateral UE swing para technique to facilitate roll and movement utilized in repetition as core strengthening as well as orienting activity. Progression toward goals:  [ ]      Improving appropriately and progressing toward goals  [X]      Improving slowly and progressing toward goals  [ ]      Not making progress toward goals and plan of care will be adjusted       PLAN:  Patient continues to benefit from skilled intervention to address the above impairments. Continue treatment per established plan of care.   Discharge Recommendations:  Rehab/ Skilled Nursing Facility/ SCI unit (preferably)   Further Equipment Recommendations for Discharge:  N/A       SUBJECTIVE:   Patient stated I am having a lot of pain today. In the abdomen and in my legs.       OBJECTIVE DATA SUMMARY:   Critical Behavior:  Neurologic State: Alert  Orientation Level: Oriented X4  Cognition: Follows commands  Safety/Judgement: Awareness of environment, Fall prevention  Functional Mobility Training:  Bed Mobility:  Rolling: Maximum assistance (x's 2 each side)  Scooting: Total assistance;Assist x2  Balance:  Sitting:  (declines 2/2 increased pain)  Therapeutic Exercises:   Bilateral LE ROM in all planes (passive with visualization and attempted resistance)  gastroc stretch,SKC   Pain:  Pt reports 6/10 pain or discomfort prior to treatment. Pt reports 6/10 pain or discomfort post treatment. Activity Tolerance:   Fair  Please refer to the flowsheet for vital signs taken during this treatment.   After treatment:   [ ] Patient left in no apparent distress sitting up in chair  [X] Patient left in no apparent distress in bed  [X] Call bell left within reach  [ ] Nursing notified  [ ] Caregiver present  [ ] Bed alarm activated      Zoe Goldsmith PTA   Time Calculation: 25 mins

## 2017-04-28 NOTE — ROUTINE PROCESS
Bedside shift change report given to Alberto Gavin RN (oncoming nurse) by Kimberly Renee RN (offgoing nurse). Report included the following information SBAR, MAR and Med Rec Status.

## 2017-04-29 LAB
BACTERIA SPEC CULT: NORMAL
BACTERIA SPEC CULT: NORMAL
GLUCOSE BLD STRIP.AUTO-MCNC: 100 MG/DL (ref 70–110)
GLUCOSE BLD STRIP.AUTO-MCNC: 104 MG/DL (ref 70–110)
GLUCOSE BLD STRIP.AUTO-MCNC: 154 MG/DL (ref 70–110)
GLUCOSE BLD STRIP.AUTO-MCNC: 176 MG/DL (ref 70–110)
GLUCOSE BLD STRIP.AUTO-MCNC: 65 MG/DL (ref 70–110)
SERVICE CMNT-IMP: NORMAL
SERVICE CMNT-IMP: NORMAL

## 2017-04-29 PROCEDURE — 74011636637 HC RX REV CODE- 636/637: Performed by: INTERNAL MEDICINE

## 2017-04-29 PROCEDURE — 82962 GLUCOSE BLOOD TEST: CPT

## 2017-04-29 PROCEDURE — 74011636637 HC RX REV CODE- 636/637: Performed by: HOSPITALIST

## 2017-04-29 PROCEDURE — 65660000000 HC RM CCU STEPDOWN

## 2017-04-29 PROCEDURE — 74011250637 HC RX REV CODE- 250/637: Performed by: HOSPITALIST

## 2017-04-29 PROCEDURE — 74011250637 HC RX REV CODE- 250/637: Performed by: INTERNAL MEDICINE

## 2017-04-29 PROCEDURE — 74011250636 HC RX REV CODE- 250/636: Performed by: INTERNAL MEDICINE

## 2017-04-29 PROCEDURE — 74011000258 HC RX REV CODE- 258: Performed by: INTERNAL MEDICINE

## 2017-04-29 RX ORDER — INSULIN LISPRO 100 [IU]/ML
INJECTION, SOLUTION INTRAVENOUS; SUBCUTANEOUS
Status: DISCONTINUED | OUTPATIENT
Start: 2017-04-29 | End: 2017-05-15

## 2017-04-29 RX ADMIN — GABAPENTIN 400 MG: 400 CAPSULE ORAL at 08:42

## 2017-04-29 RX ADMIN — Medication 2 TABLET: at 17:34

## 2017-04-29 RX ADMIN — DOCUSATE SODIUM 100 MG: 100 CAPSULE, LIQUID FILLED ORAL at 08:42

## 2017-04-29 RX ADMIN — CHOLECALCIFEROL TAB 25 MCG (1000 UNIT) 2000 UNITS: 25 TAB at 08:42

## 2017-04-29 RX ADMIN — FAMOTIDINE 20 MG: 20 TABLET ORAL at 08:42

## 2017-04-29 RX ADMIN — GABAPENTIN 400 MG: 400 CAPSULE ORAL at 17:33

## 2017-04-29 RX ADMIN — INSULIN LISPRO 3 UNITS: 100 INJECTION, SOLUTION INTRAVENOUS; SUBCUTANEOUS at 00:44

## 2017-04-29 RX ADMIN — CLOTRIMAZOLE 10 MG: 10 LOZENGE ORAL at 17:34

## 2017-04-29 RX ADMIN — CLOTRIMAZOLE 10 MG: 10 LOZENGE ORAL at 21:04

## 2017-04-29 RX ADMIN — CLOTRIMAZOLE 10 MG: 10 LOZENGE ORAL at 14:10

## 2017-04-29 RX ADMIN — FAMOTIDINE 20 MG: 20 TABLET ORAL at 17:40

## 2017-04-29 RX ADMIN — FOLIC ACID 1 MG: 1 TABLET ORAL at 08:42

## 2017-04-29 RX ADMIN — CEFTRIAXONE SODIUM 2 G: 2 INJECTION, POWDER, FOR SOLUTION INTRAMUSCULAR; INTRAVENOUS at 14:11

## 2017-04-29 RX ADMIN — INSULIN GLARGINE 20 UNITS: 100 INJECTION, SOLUTION SUBCUTANEOUS at 08:40

## 2017-04-29 RX ADMIN — PRAVASTATIN SODIUM 40 MG: 20 TABLET ORAL at 21:04

## 2017-04-29 RX ADMIN — INSULIN GLARGINE 20 UNITS: 100 INJECTION, SOLUTION SUBCUTANEOUS at 21:04

## 2017-04-29 RX ADMIN — ACETAMINOPHEN 500 MG: 500 TABLET, COATED ORAL at 17:33

## 2017-04-29 RX ADMIN — OXYCODONE HYDROCHLORIDE AND ACETAMINOPHEN 1 TABLET: 7.5; 325 TABLET ORAL at 21:06

## 2017-04-29 RX ADMIN — ALLOPURINOL 100 MG: 100 TABLET ORAL at 08:42

## 2017-04-29 RX ADMIN — INSULIN LISPRO 3 UNITS: 100 INJECTION, SOLUTION INTRAVENOUS; SUBCUTANEOUS at 14:08

## 2017-04-29 NOTE — PROGRESS NOTES
[facility] Hospitalist Group  Progress Note    Patient: Abhay Garcia Age: 77 y.o. : 1950 MR#: [id number] SSN: [id number]  Date/Time: 2017 12:13 PM    Subjective:     No F/C, N/V, CP, SOB, pain c/o. Assessment/Plan:   1. RLE weakness with infected ilipsoas hematoma - maintain abx per ID, on Vanc, Gent, Rocephin. Spine surgery eval'd, stable neurologic status.  blood cx negative.  blood cx NGTD. R psoas drain cx many grp B beta-hemo Strep., same organism in  blood cxs. 2. Possible pacemaker pocket abscess - conservative tx per cardiology. Abx as above. Drain check by IR next week per ID. 3. DM - basal/bolus insulin. 4. Elevated trop I - demand ischemia. No CP c/o.   5. Acute blood loss anemia from #1 - xfused 1u PRBC on . H/H stable. 6. UTI - covered by above abx for >100k grp D E. Faecalis. No new issues. 7. Hx niCMY - EF 45%. No acute. Compensated. Additional Notes:      Case discussed with:  [x]Patient  []Family  []Nursing  []Case Management  DVT Prophylaxis:  []Lovenox  []Hep SQ  [x]SCDs  []Coumadin   []On Heparin gtt    Objective:   VS:   Visit Vitals    /88 (BP 1 Location: Left arm, BP Patient Position: At rest)    Pulse 73    Temp 100.2 °F (37.9 °C)    Resp 20    Wt 116.1 kg (256 lb)    SpO2 92%    Breastfeeding No    BMI 40.1 kg/m2      Tmax/24hrs: Temp (24hrs), Av.2 °F (37.3 °C), Min:98 °F (36.7 °C), Max:100.2 °F (37.9 °C)    Intake/Output Summary (Last 24 hours) at 17 1213  Last data filed at 17 0736   Gross per 24 hour   Intake              800 ml   Output             1450 ml   Net             -650 ml       General:  Awake, alert, NAD. Cardiovascular:  RRR. Pulmonary:  CTA B.  GI:  Soft, NT/ND, NABS. Extremities:  No CT or edema.    Additional:      Labs:    Recent Results (from the past 24 hour(s))   GLUCOSE, POC    Collection Time: 17  4:59 PM   Result Value Ref Range    Glucose (POC) 111 (H) 70 - 110 mg/dL   GLUCOSE, POC    Collection Time: 04/28/17  9:42 PM   Result Value Ref Range    Glucose (POC) 170 (H) 70 - 110 mg/dL   GLUCOSE, POC    Collection Time: 04/29/17 12:41 AM   Result Value Ref Range    Glucose (POC) 176 (H) 70 - 110 mg/dL   GLUCOSE, POC    Collection Time: 04/29/17  6:56 AM   Result Value Ref Range    Glucose (POC) 100 70 - 110 mg/dL   GLUCOSE, POC    Collection Time: 04/29/17 11:06 AM   Result Value Ref Range    Glucose (POC) 154 (H) 70 - 110 mg/dL       Signed By: Michelle Stewart MD     April 29, 2017 12:13 PM

## 2017-04-29 NOTE — ROUTINE PROCESS
Bedside and Verbal shift change report given to Shelly (oncoming nurse) by Maame Jewell (offgoing nurse). Report included the following information SBAR, Kardex, MAR and Recent Results.

## 2017-04-29 NOTE — PROGRESS NOTES
conducted a Follow up consultation and Spiritual Assessment for Jerod Luo, who is a 77 y.o.,female. The  provided the following Interventions:  Continued the relationship of care and support. Listened empathically. Offered prayer and assurance of continued prayer on patients behalf. Chart reviewed. The following outcomes were achieved:  Patient expressed gratitude for 's visit. Assessment:  There are no further spiritual or [employer] issues which require Spiritual Care Services interventions at this time. Plan:  Chaplains will continue to follow and will provide pastoral care on an as needed/requested basis.  recommends bedside caregivers page  on duty if patient shows signs of acute spiritual or emotional distress.        [id number]   [phone]

## 2017-04-30 LAB
ANION GAP BLD CALC-SCNC: 8 MMOL/L (ref 3–18)
BASOPHILS # BLD AUTO: 0 K/UL (ref 0–0.06)
BASOPHILS # BLD: 0 % (ref 0–3)
BUN SERPL-MCNC: 37 MG/DL (ref 7–18)
BUN/CREAT SERPL: 12 (ref 12–20)
CALCIUM SERPL-MCNC: 8.1 MG/DL (ref 8.5–10.1)
CHLORIDE SERPL-SCNC: 99 MMOL/L (ref 100–108)
CO2 SERPL-SCNC: 28 MMOL/L (ref 21–32)
CREAT SERPL-MCNC: 3.06 MG/DL (ref 0.6–1.3)
DIFFERENTIAL METHOD BLD: ABNORMAL
EOSINOPHIL # BLD: 0.2 K/UL (ref 0–0.4)
EOSINOPHIL NFR BLD: 2 % (ref 0–5)
ERYTHROCYTE [DISTWIDTH] IN BLOOD BY AUTOMATED COUNT: 16.9 % (ref 11.6–14.5)
GLUCOSE BLD STRIP.AUTO-MCNC: 130 MG/DL (ref 70–110)
GLUCOSE BLD STRIP.AUTO-MCNC: 131 MG/DL (ref 70–110)
GLUCOSE BLD STRIP.AUTO-MCNC: 154 MG/DL (ref 70–110)
GLUCOSE BLD STRIP.AUTO-MCNC: 85 MG/DL (ref 70–110)
GLUCOSE SERPL-MCNC: 61 MG/DL (ref 74–99)
HCT VFR BLD AUTO: 24.1 % (ref 35–45)
HGB BLD-MCNC: 8 G/DL (ref 12–16)
LYMPHOCYTES # BLD AUTO: 17 % (ref 20–51)
LYMPHOCYTES # BLD: 1.4 K/UL (ref 0.8–3.5)
MCH RBC QN AUTO: 26.1 PG (ref 24–34)
MCHC RBC AUTO-ENTMCNC: 33.2 G/DL (ref 31–37)
MCV RBC AUTO: 78.8 FL (ref 74–97)
METAMYELOCYTES NFR BLD MANUAL: 1 %
MONOCYTES # BLD: 0.4 K/UL (ref 0–1)
MONOCYTES NFR BLD AUTO: 5 % (ref 2–9)
NEUTS BAND NFR BLD MANUAL: 1 % (ref 0–5)
NEUTS SEG # BLD: 6 K/UL (ref 1.8–8)
NEUTS SEG NFR BLD AUTO: 74 % (ref 42–75)
PLATELET # BLD AUTO: 190 K/UL (ref 135–420)
PLATELET COMMENTS,PCOM: ABNORMAL
PMV BLD AUTO: 10.2 FL (ref 9.2–11.8)
POTASSIUM SERPL-SCNC: 3.6 MMOL/L (ref 3.5–5.5)
RBC # BLD AUTO: 3.06 M/UL (ref 4.2–5.3)
RBC MORPH BLD: ABNORMAL
SODIUM SERPL-SCNC: 135 MMOL/L (ref 136–145)
WBC # BLD AUTO: 8 K/UL (ref 4.6–13.2)

## 2017-04-30 PROCEDURE — 85025 COMPLETE CBC W/AUTO DIFF WBC: CPT | Performed by: FAMILY MEDICINE

## 2017-04-30 PROCEDURE — 74011636637 HC RX REV CODE- 636/637: Performed by: INTERNAL MEDICINE

## 2017-04-30 PROCEDURE — 65660000000 HC RM CCU STEPDOWN

## 2017-04-30 PROCEDURE — 74011250637 HC RX REV CODE- 250/637: Performed by: INTERNAL MEDICINE

## 2017-04-30 PROCEDURE — 82962 GLUCOSE BLOOD TEST: CPT

## 2017-04-30 PROCEDURE — 80048 BASIC METABOLIC PNL TOTAL CA: CPT | Performed by: FAMILY MEDICINE

## 2017-04-30 PROCEDURE — 74011250636 HC RX REV CODE- 250/636: Performed by: INTERNAL MEDICINE

## 2017-04-30 PROCEDURE — 77030005538 HC CATH URETH FOL44 BARD -B

## 2017-04-30 PROCEDURE — 74011250637 HC RX REV CODE- 250/637: Performed by: HOSPITALIST

## 2017-04-30 PROCEDURE — 74011000258 HC RX REV CODE- 258: Performed by: INTERNAL MEDICINE

## 2017-04-30 RX ADMIN — CLOTRIMAZOLE 10 MG: 10 LOZENGE ORAL at 09:20

## 2017-04-30 RX ADMIN — CLOTRIMAZOLE 10 MG: 10 LOZENGE ORAL at 13:40

## 2017-04-30 RX ADMIN — INSULIN GLARGINE 20 UNITS: 100 INJECTION, SOLUTION SUBCUTANEOUS at 21:56

## 2017-04-30 RX ADMIN — OXYCODONE HYDROCHLORIDE AND ACETAMINOPHEN 1 TABLET: 7.5; 325 TABLET ORAL at 17:17

## 2017-04-30 RX ADMIN — FAMOTIDINE 20 MG: 20 TABLET ORAL at 17:17

## 2017-04-30 RX ADMIN — GABAPENTIN 400 MG: 400 CAPSULE ORAL at 17:17

## 2017-04-30 RX ADMIN — CEFTRIAXONE SODIUM 2 G: 2 INJECTION, POWDER, FOR SOLUTION INTRAMUSCULAR; INTRAVENOUS at 13:37

## 2017-04-30 RX ADMIN — ALLOPURINOL 100 MG: 100 TABLET ORAL at 09:20

## 2017-04-30 RX ADMIN — FAMOTIDINE 20 MG: 20 TABLET ORAL at 09:20

## 2017-04-30 RX ADMIN — CHOLECALCIFEROL TAB 25 MCG (1000 UNIT) 2000 UNITS: 25 TAB at 09:20

## 2017-04-30 RX ADMIN — GABAPENTIN 400 MG: 400 CAPSULE ORAL at 09:20

## 2017-04-30 RX ADMIN — DOCUSATE SODIUM 100 MG: 100 CAPSULE, LIQUID FILLED ORAL at 09:20

## 2017-04-30 RX ADMIN — FOLIC ACID 1 MG: 1 TABLET ORAL at 09:21

## 2017-04-30 RX ADMIN — PRAVASTATIN SODIUM 40 MG: 20 TABLET ORAL at 21:57

## 2017-04-30 RX ADMIN — CLOTRIMAZOLE 10 MG: 10 LOZENGE ORAL at 21:57

## 2017-04-30 RX ADMIN — INSULIN GLARGINE 20 UNITS: 100 INJECTION, SOLUTION SUBCUTANEOUS at 09:28

## 2017-04-30 RX ADMIN — CLOTRIMAZOLE 10 MG: 10 LOZENGE ORAL at 17:17

## 2017-04-30 NOTE — PROGRESS NOTES
Bladder scanned patient 739cc. Telephone order from Dr. Jes Patton to straight cath (925cc output) and rescan bladder at 1800.

## 2017-04-30 NOTE — ROUTINE PROCESS
No blood return from either port on picc line. Lines flush with ease, however no blood return. Lab in to draw blood peripherally.

## 2017-04-30 NOTE — PROGRESS NOTES
[facility] Hospitalist Group  Progress Note    Patient: Sebas Hedrick Age: 77 y.o. : 1950 MR#: [id number] SSN: [id number]  Date/Time: 2017 12:13 PM    Subjective: Has mild, generalized HA. No F/C, N/V, CP, SOB. Feels pressure suprapubically, reports no void. Case d/w nursing. Bladder scan with >700cc. Assessment/Plan:   1. RLE weakness with infected ilipsoas hematoma - maintain abx per ID. Spine surgery eval'd, stable neurologic status.  blood cx negative.  blood cx NGTD. R psoas drain cx many grp B beta-hemo Strep., same organism in  blood cxs. Plan for Rocephin through 17 per ID. 2. Possible pacemaker pocket abscess - conservative tx per cardiology. Abx as above. Drain check by IR next week per ID. Site clean. 3. DM - basal/bolus insulin. 4. Elevated trop I - demand ischemia. No CP c/o.   5. Acute blood loss anemia from #1 - xfused 1u PRBC on . H/H remains stable. 6. UTI - covered by above abx for >100k grp D E. Faecalis. No new issues. 7. Hx niCMY - EF 45%. No acute. Compensated. 8. Urinary retention - will straight cath. Recheck later today. If still retaining, re-place Krueger. As d/w nursing. 9. Labs ordered for today not in records.  Will check in AM.    Additional Notes:      Case discussed with:  [x]Patient  [x]Family  [x]Nursing  []Case Management  DVT Prophylaxis:  []Lovenox  []Hep SQ  [x]SCDs  []Coumadin   []On Heparin gtt    Objective:   VS:   Visit Vitals    /66 (BP 1 Location: Left arm, BP Patient Position: At rest)    Pulse 84    Temp 98.6 °F (37 °C)    Resp 18    Ht 5' 7\" (1.702 m)    Wt 111.3 kg (245 lb 4.8 oz)    SpO2 92%    Breastfeeding No    BMI 38.42 kg/m2      Tmax/24hrs: Temp (24hrs), Av.1 °F (36.7 °C), Min:97.6 °F (36.4 °C), Max:98.7 °F (37.1 °C)      Intake/Output Summary (Last 24 hours) at 17 1423  Last data filed at 17 0400   Gross per 24 hour   Intake                0 ml   Output 2250 ml   Net            -2250 ml       General:  Awake, alert, NAD. Cardiovascular:  RRR. Pulmonary:  CTA B.  GI:  Soft, NT/ND, NABS. Extremities:  No CT or edema.    Additional:      Labs:    Recent Results (from the past 24 hour(s))   GLUCOSE, POC    Collection Time: 04/29/17  5:24 PM   Result Value Ref Range    Glucose (POC) 104 70 - 110 mg/dL   GLUCOSE, POC    Collection Time: 04/29/17  9:59 PM   Result Value Ref Range    Glucose (POC) 65 (L) 70 - 110 mg/dL   GLUCOSE, POC    Collection Time: 04/30/17  7:26 AM   Result Value Ref Range    Glucose (POC) 85 70 - 110 mg/dL   GLUCOSE, POC    Collection Time: 04/30/17 10:58 AM   Result Value Ref Range    Glucose (POC) 154 (H) 70 - 110 mg/dL       Signed By: Manuel Flannery MD     April 30, 2017 12:13 PM

## 2017-04-30 NOTE — ROUTINE PROCESS
Pt has not vioded since perez was d/c'd. Pt c/o pressure. Bladder scan = 368. Pt straight cathed for 450ml's. Pt stated relief from pressure.

## 2017-05-01 LAB
ANION GAP BLD CALC-SCNC: 8 MMOL/L (ref 3–18)
BASOPHILS # BLD AUTO: 0 K/UL (ref 0–0.06)
BASOPHILS # BLD: 0 % (ref 0–3)
BUN SERPL-MCNC: 38 MG/DL (ref 7–18)
BUN/CREAT SERPL: 11 (ref 12–20)
CALCIUM SERPL-MCNC: 8.1 MG/DL (ref 8.5–10.1)
CHLORIDE SERPL-SCNC: 100 MMOL/L (ref 100–108)
CO2 SERPL-SCNC: 27 MMOL/L (ref 21–32)
CREAT SERPL-MCNC: 3.37 MG/DL (ref 0.6–1.3)
DIFFERENTIAL METHOD BLD: ABNORMAL
EOSINOPHIL # BLD: 0.2 K/UL (ref 0–0.4)
EOSINOPHIL NFR BLD: 2 % (ref 0–5)
ERYTHROCYTE [DISTWIDTH] IN BLOOD BY AUTOMATED COUNT: 16.4 % (ref 11.6–14.5)
GLUCOSE BLD STRIP.AUTO-MCNC: 132 MG/DL (ref 70–110)
GLUCOSE BLD STRIP.AUTO-MCNC: 145 MG/DL (ref 70–110)
GLUCOSE BLD STRIP.AUTO-MCNC: 175 MG/DL (ref 70–110)
GLUCOSE BLD STRIP.AUTO-MCNC: 67 MG/DL (ref 70–110)
GLUCOSE SERPL-MCNC: 63 MG/DL (ref 74–99)
HCT VFR BLD AUTO: 24.6 % (ref 35–45)
HGB BLD-MCNC: 8.2 G/DL (ref 12–16)
LYMPHOCYTES # BLD AUTO: 7 % (ref 20–51)
LYMPHOCYTES # BLD: 0.6 K/UL (ref 0.8–3.5)
MCH RBC QN AUTO: 25.9 PG (ref 24–34)
MCHC RBC AUTO-ENTMCNC: 33.3 G/DL (ref 31–37)
MCV RBC AUTO: 77.8 FL (ref 74–97)
MONOCYTES # BLD: 0.4 K/UL (ref 0–1)
MONOCYTES NFR BLD AUTO: 4 % (ref 2–9)
NEUTS BAND NFR BLD MANUAL: 4 % (ref 0–5)
NEUTS SEG # BLD: 7.6 K/UL (ref 1.8–8)
NEUTS SEG NFR BLD AUTO: 83 % (ref 42–75)
NRBC BLD-RTO: 1 PER 100 WBC
PLATELET # BLD AUTO: 188 K/UL (ref 135–420)
PLATELET COMMENTS,PCOM: ABNORMAL
PMV BLD AUTO: 9.5 FL (ref 9.2–11.8)
POTASSIUM SERPL-SCNC: 3.9 MMOL/L (ref 3.5–5.5)
RBC # BLD AUTO: 3.16 M/UL (ref 4.2–5.3)
RBC MORPH BLD: ABNORMAL
RBC MORPH BLD: ABNORMAL
SODIUM SERPL-SCNC: 135 MMOL/L (ref 136–145)
WBC # BLD AUTO: 8.8 K/UL (ref 4.6–13.2)

## 2017-05-01 PROCEDURE — 74011636637 HC RX REV CODE- 636/637: Performed by: HOSPITALIST

## 2017-05-01 PROCEDURE — 97530 THERAPEUTIC ACTIVITIES: CPT

## 2017-05-01 PROCEDURE — 74011250637 HC RX REV CODE- 250/637: Performed by: INTERNAL MEDICINE

## 2017-05-01 PROCEDURE — 74011250636 HC RX REV CODE- 250/636: Performed by: INTERNAL MEDICINE

## 2017-05-01 PROCEDURE — 74011000258 HC RX REV CODE- 258: Performed by: INTERNAL MEDICINE

## 2017-05-01 PROCEDURE — 65660000000 HC RM CCU STEPDOWN

## 2017-05-01 PROCEDURE — 36415 COLL VENOUS BLD VENIPUNCTURE: CPT | Performed by: FAMILY MEDICINE

## 2017-05-01 PROCEDURE — 74011250637 HC RX REV CODE- 250/637: Performed by: HOSPITALIST

## 2017-05-01 PROCEDURE — 80048 BASIC METABOLIC PNL TOTAL CA: CPT | Performed by: FAMILY MEDICINE

## 2017-05-01 PROCEDURE — 85025 COMPLETE CBC W/AUTO DIFF WBC: CPT | Performed by: FAMILY MEDICINE

## 2017-05-01 PROCEDURE — 74011636637 HC RX REV CODE- 636/637: Performed by: INTERNAL MEDICINE

## 2017-05-01 PROCEDURE — 82962 GLUCOSE BLOOD TEST: CPT

## 2017-05-01 PROCEDURE — 97164 PT RE-EVAL EST PLAN CARE: CPT

## 2017-05-01 PROCEDURE — 74011000250 HC RX REV CODE- 250: Performed by: INTERNAL MEDICINE

## 2017-05-01 RX ORDER — FAMOTIDINE 20 MG/1
20 TABLET, FILM COATED ORAL DAILY
Status: DISCONTINUED | OUTPATIENT
Start: 2017-05-02 | End: 2017-05-21

## 2017-05-01 RX ORDER — SODIUM CHLORIDE 9 MG/ML
25 INJECTION, SOLUTION INTRAVENOUS CONTINUOUS
Status: DISCONTINUED | OUTPATIENT
Start: 2017-05-01 | End: 2017-05-05

## 2017-05-01 RX ADMIN — PRAVASTATIN SODIUM 40 MG: 20 TABLET ORAL at 22:21

## 2017-05-01 RX ADMIN — SODIUM CHLORIDE 75 ML/HR: 900 INJECTION, SOLUTION INTRAVENOUS at 18:38

## 2017-05-01 RX ADMIN — INSULIN GLARGINE 20 UNITS: 100 INJECTION, SOLUTION SUBCUTANEOUS at 09:20

## 2017-05-01 RX ADMIN — DOCUSATE SODIUM 100 MG: 100 CAPSULE, LIQUID FILLED ORAL at 09:20

## 2017-05-01 RX ADMIN — CLOTRIMAZOLE 10 MG: 10 LOZENGE ORAL at 22:21

## 2017-05-01 RX ADMIN — CEFTRIAXONE SODIUM 2 G: 2 INJECTION, POWDER, FOR SOLUTION INTRAMUSCULAR; INTRAVENOUS at 10:39

## 2017-05-01 RX ADMIN — POLYETHYLENE GLYCOL 3350 17 G: 17 POWDER, FOR SOLUTION ORAL at 17:16

## 2017-05-01 RX ADMIN — CLOTRIMAZOLE 10 MG: 10 LOZENGE ORAL at 10:40

## 2017-05-01 RX ADMIN — OXYCODONE HYDROCHLORIDE AND ACETAMINOPHEN 1 TABLET: 7.5; 325 TABLET ORAL at 09:19

## 2017-05-01 RX ADMIN — POLYETHYLENE GLYCOL 3350 17 G: 17 POWDER, FOR SOLUTION ORAL at 22:21

## 2017-05-01 RX ADMIN — FAMOTIDINE 20 MG: 20 TABLET ORAL at 09:19

## 2017-05-01 RX ADMIN — CLOTRIMAZOLE 10 MG: 10 LOZENGE ORAL at 17:16

## 2017-05-01 RX ADMIN — ALLOPURINOL 100 MG: 100 TABLET ORAL at 09:19

## 2017-05-01 RX ADMIN — CLOTRIMAZOLE 10 MG: 10 LOZENGE ORAL at 14:00

## 2017-05-01 RX ADMIN — CLOTRIMAZOLE 10 MG: 10 LOZENGE ORAL at 09:21

## 2017-05-01 RX ADMIN — Medication 2 TABLET: at 17:16

## 2017-05-01 RX ADMIN — CHOLECALCIFEROL TAB 25 MCG (1000 UNIT) 2000 UNITS: 25 TAB at 09:19

## 2017-05-01 RX ADMIN — ALTEPLASE 1 MG: 2.2 INJECTION, POWDER, LYOPHILIZED, FOR SOLUTION INTRAVENOUS at 17:11

## 2017-05-01 RX ADMIN — GABAPENTIN 400 MG: 400 CAPSULE ORAL at 09:19

## 2017-05-01 RX ADMIN — INSULIN LISPRO 3 UNITS: 100 INJECTION, SOLUTION INTRAVENOUS; SUBCUTANEOUS at 22:27

## 2017-05-01 RX ADMIN — GABAPENTIN 400 MG: 400 CAPSULE ORAL at 17:16

## 2017-05-01 RX ADMIN — FOLIC ACID 1 MG: 1 TABLET ORAL at 09:19

## 2017-05-01 NOTE — DIABETES MGMT
GLYCEMIC CONTROL PLAN OF CARE    Assessment/Recommendations:  Patient is 77year old with past medical history including type 2 diabetes mellitus, diabetic neuropathy, CHF, dyslipidemia, obstructive sleep apnea, complete heart block, AICD placement - was being prepared for discharge from rehab when patient develop sudden weakness and c/o of increasing burning sensation of the right leg. Patient was admitted to acute care on 04/20/2017. Noted:  Right leg weakness  Iliopsoas hematoma, infected. Type 2 diabetes mellitus with A1C of 8.0% (03/30/2017). POC BG range on 04/30/2017:  mg/dL. POC BG report on 05/01/2017 at time of review: 79 (time:0716) patient stated that she ate her breakfast without any problem, 132 (time: 1134) mg/dL. Patient stated that she had a low blood sugar this morning before breakfast.    Recommendations: Called Dr. Fox Coughlin and obtained order to discontinue HS lantus insulin. Nursing to follow hypoglycemia protocol. Most recent blood glucose values:    Results for Deb Aaron (MRN [id number]) as of 5/1/2017 16:09   Ref. Range 4/30/2017 07:26 4/30/2017 10:58 4/30/2017 16:36 4/30/2017 21:17   GLUCOSE,FAST - POC Latest Ref Range: 70 - 110 mg/dL 85 154 (H) 131 (H) 130 (H)     Results for Deb Aaron (MRN [id number]) as of 5/1/2017 16:09   Ref. Range 5/1/2017 07:16 5/1/2017 11:34   GLUCOSE,FAST - POC Latest Ref Range: 70 - 110 mg/dL 67 (L) 132 (H)     Current A1C of 8.0% (03/30/2017) is equivalent to average blood glucose of 183 mg/dL during the past 2-3 months.      Current hospital diabetes medications:   Lantus insulin 20 units every morning. Correctional Lispro insulin ACHS. Very resistant dose.      Previous day's insulin requirements: 04/30/2017  Lantus insulin: 40 units  Lispro: None.      Home diabetes medications: Per educational notes, 03/31/2017:  Januvia 50 mg daily every morning.   Lantus insulin 15 units nightly   Novolog insulin per sliding scale      Diet: Diabetic Consistent Carbohydrate; regular; nutr suppl: glucerna shake with all meals. Goals: Patient's blood glucose will be within target range on  mg/dL by 05/04/2017.     Education:  __x__Refer to Diabetes Education Record: 3/31/2017            ____Education not indicated at this time      Yogesh Mobley RN

## 2017-05-01 NOTE — PROGRESS NOTES
CM met with pt in room. Pt shares that she is considering SNF placement for short term rehab. Pt wishes to discuss options with her daughter Medina Vicente ([phone]).  CM will follow to assist.

## 2017-05-01 NOTE — CONSULTS
Consult Note  Consult requested by: Dr. Chris Guerrier is a 77 y.o. female [address] who is being seen on consult for acute kidney injury . No chief complaint on file. Impression & Plan:   IMPRESSION:   Acute kidney injury, recent episode last week, multiple risk factor ,urinary retention, ( high suspicion ) , poor po intake over last few weeks, ongoing sepsis, ? Septic emboli,  Medication toxicity such as was on gentamycin, ? Allergic reaction from ceftriaxone  Urinary retention, ? nurogenic bladder  Right leg weakness  Heart block, sepsi,  S/p AICD placement   PLAN:   Would start on normal saline at rate 75cc/hr, monitor renal function and urine output. She will need perez for now. Will check Urine study including eosinphill   Discussed with Dr. Rob Coughlin, Dr. Margurite Soulier. Admission diagnosis: Acute paraplegia (HCC)     HPI:  73y F with PMH DM, HTN, CHF, DM, neuropathy, evaluated today for LISBETH. Ms. Rachelle Chaves was admitted last month to hospital for her right psoas hematoma for supratheraputic INR. She was discharged to rehab end of her hospital stay. Recenly she found to have progressive weakness in her right leg and she was admitted back to medicine service. Nephrology service was consulted as her creatinine jumped from  1.4 to 3.3 in last few days. Patient was evaluated by spine surgery team, infection disease  and neurology team. She has been receiving abx regularly. She was also evaluated by cardiology for her elevated troponin. I reviewed her lab, she had multiple LISBETH over this hospitalization. Her creatinine was at 2.3 on admission , which improved, then she had another episode of LISBETH last month where creatinine jumped to 1.9 , and this is about third episode of LISBETH. Her creatinine was at 1.2 on 24th April and now has been trending up. On admission she had urinary retention, perez was placed and had approx 900cc urine output.      During my evaluation she denies for any chest pain, short of breath, palpitation, nausea, vomiting. She had perez catheter placement last         Past Medical History:   Diagnosis Date    Acute paraplegia ([facility] 4/20/2017    Benign hypertensive heart disease with systolic CHF, NYHA class 2 ([facility] 9/5/2012    Biventricular implantable cardioverter-defibrillator in situ 04/28/2005    Upgraded to BiV AICD; gen change 4/2008; pocket revision 10/2009; Abdominal - done on 8/22/2012 by Dr. Zelaya Favorite Cardiac cath 08/15/1996    Patent coronaries. Elev LVEDP. EF 50-55%.  Cardiac echocardiogram 06/23/2015    Ltd study. EF 45-50%. Mild, diffuse hypk. Severe apical hypk. No mass or thrombus was clearly identified, although imaging was suboptimal.      Cardiac nuclear imaging test 06/19/2015    Fixed distal apical, distal septal defect more likely due to RV pacing than prior infarct. No ischemia. EF 46%. RWMA c/w RV pacing. Nondiagnostic EKG on pharm stress test.      Cardiovascular lower extremity venous duplex 09/04/2012    Acute, non-occlusive DVT in CFV on right. No DVT on left. No superficial thrombosis bilaterally.  Cardiovascular upper extremity venous duplex 08/27/2012    DVT in axillary vein on left. Left subclavian was not visualized.     Chronic anemia 9/5/2012    Chronic systolic heart failure (HCC)     Decreased calculated glomerular filtration rate (GFR) 3/30/2017    Calculated GFR equivalent to that of CKD stage 3 = 30-59 ml/min    Diabetic neuropathy associated with type 2 diabetes mellitus ([facility] 6/28/2011    Difficult airway for intubation 08/22/2012    see anesthesia airway note    Dyslipidemia 6/28/2011    Gout     History of complete heart block 6/28/2011    History of Coumadin therapy     Anticoagulation for DVT of the LUE; Discontinued on 3/30/2017    History of deep venous thrombosis 9/5/2012    Left upper extremity    History of pyelonephritis 3/30/2017    Left bundle branch block (LBBB) on electrocardiogram 6/28/2011    Nonischemic cardiomyopathy ([facility] 6/28/2011    Obesity (BMI 35.0-39.9 without comorbidity) ([facility] 3/13/2017    Obstructive sleep apnea on CPAP 2/7/2012    Psoas abscess, right ([facility] 4/20/2017    Psoas hematoma, right, secondary to anticoagulant therapy 3/30/2017    Type 2 diabetes mellitus with diabetic neuropathy ([facility] 6/28/2011      Past Surgical History:   Procedure Laterality Date    HX CARPAL TUNNEL RELEASE  4/07    right     HX CHOLECYSTECTOMY  1994    HX HYSTERECTOMY  1973    HX OTHER SURGICAL  6/11/2012    AICD revision    HX PACEMAKER  4/28/2005    [employer] AICD       Social History     Social History    Marital status:      Spouse name: N/A    Number of children: N/A    Years of education: N/A     Occupational History    Not on file.      Social History Main Topics    Smoking status: Never Smoker    Smokeless tobacco: Never Used    Alcohol use No    Drug use: No    Sexual activity: Yes     Partners: Male     Other Topics Concern    Not on file     Social History Narrative       Family History   Problem Relation Age of Onset    Cancer Father      Leukemia     Allergies   Allergen Reactions    Vancomycin Itching    Ampicillin Itching    Bactrim [Sulfamethoxazole-Trimethoprim] Unknown (comments)    Blueberry Swelling     Causes throat swelling    Ciprofloxacin Itching    Codeine Other (comments)     Jumpy feeling    Crestor [Rosuvastatin] Itching    Darvocet A500 [Propoxyphene N-Acetaminophen] Itching    Demerol [Meperidine] Itching    Levaquin [Levofloxacin] Itching    Lipitor [Atorvastatin] Myalgia    Magnesium Oxide Itching     nausea    Minocin [Minocycline] Unknown (comments)    Pcn [Penicillins] Itching    Pravachol [Pravastatin] Swelling     Swelling in mouth     Sulfa (Sulfonamide Antibiotics) Itching    Ultracet [Tramadol-Acetaminophen] Itching    Vicodin [Hydrocodone-Acetaminophen] Unknown (comments)    Vytorin 10-10 [Ezetimibe-Simvastatin] Myalgia    Percodan [Oxycodone Hcl-Oxycodone-Asa] Itching        Home Medications:     Prior to Admission Medications   Prescriptions Last Dose Informant Patient Reported? Taking? SITagliptin (JANUVIA) 50 mg tablet   Yes No   Sig: Take 50 mg by mouth daily. Indications: type 2 diabetes mellitus   acetaminophen (TYLENOL) 325 mg tablet   No No   Sig: Take 2 Tabs by mouth every four (4) hours as needed (for fever or pain level less than 5/10). Indications: Fever, Pain   allopurinol (ZYLOPRIM) 100 mg tablet   No No   Sig: Take 1 Tab by mouth daily. Indications: GOUT   bumetanide (BUMEX) 1 mg tablet   No No   Sig: TAKE 1 TABLET TWICE A DAY   carvedilol (COREG) 6.25 mg tablet   No No   Sig: Take 1 Tab by mouth two (2) times daily (with meals). Indications: hypertension   cholecalciferol (VITAMIN D3) 1,000 unit tablet   No No   Sig: Take 2 Tabs by mouth daily. Indications: PREVENTION OF VITAMIN D DEFICIENCY   cyclobenzaprine (FLEXERIL) 10 mg tablet   Yes No   Sig: Take 10 mg by mouth as needed for Muscle Spasm(s). Indications: MUSCLE SPASM   docusate sodium (COLACE) 100 mg capsule   No No   Sig: Take 1 Cap by mouth daily (after breakfast). Indications: Constipation   ferrous sulfate 325 mg (65 mg iron) tablet   No No   Sig: Take 1 Tab by mouth two (2) times daily (with meals). gabapentin (NEURONTIN) 400 mg capsule   No No   Sig: Take 1 Cap by mouth two (2) times a day. Indications: NEUROPATHIC PAIN   insulin aspart (NOVOLOG) 100 unit/mL injection   No No   Sig: INITIATE INSULIN CORRECTIVE PROTOCOL (HR): Normal Insulin Sensitivity  For Blood Sugar (mg/dL) of:    Less than 150 =   0 units          150 -199 =   2 units 200 -249 =   4 units 250 -299 =   6 units 300 -349 =   8 units 350 and above =   10 units If 2 glucose readings are above 200 mg/dL   insulin glargine (LANTUS) 100 unit/mL injection   No No   Sig: 15 Units by SubCUTAneous route nightly.  Indications: type 2 diabetes mellitus ondansetron (ZOFRAN ODT) 4 mg disintegrating tablet   Yes No   Sig: Take 4 mg by mouth every eight (8) hours as needed for Nausea. oxyCODONE-acetaminophen (PERCOCET 7.5) 7.5-325 mg per tablet   No No   Sig: Take 1 Tab by mouth every four (4) hours as needed (for pain level greater than 4/10). Max Daily Amount: 6 Tabs. Indications: Pain   potassium chloride (KLOR-CON M20) 20 mEq tablet   Yes No   Sig: Take 20 mEq by mouth two (2) times a day. Indications: HYPOKALEMIA PREVENTION   pravastatin (PRAVACHOL) 40 mg tablet   Yes No   Sig: Take 40 mg by mouth nightly. Indications: DYSLIPIDEMIA   senna-docusate (PERICOLACE) 8.6-50 mg per tablet   No No   Sig: Take 2 Tabs by mouth daily (after dinner).  Indications: Constipation      Facility-Administered Medications: None       Current Facility-Administered Medications   Medication Dose Route Frequency    [START ON 5/2/2017] famotidine (PEPCID) tablet 20 mg  20 mg Oral DAILY    alteplase (CATHFLO) 1 mg in sterile water (preservative free) 1 mL injection  1 mg InterCATHeter ONCE    insulin lispro (HUMALOG) injection   SubCUTAneous AC&HS    0.9% sodium chloride infusion 250 mL  250 mL IntraVENous PRN    cefTRIAXone (ROCEPHIN) 2 g in 0.9% sodium chloride (MBP/ADV) 50 mL MBP  2 g IntraVENous Q24H    insulin glargine (LANTUS) injection 20 Units  20 Units SubCUTAneous DAILY    diphenhydrAMINE (BENADRYL) capsule 50 mg  50 mg Oral Q4H PRN    polyethylene glycol (MIRALAX) packet 17 g  17 g Oral TID    insulin glargine (LANTUS) injection 20 Units  20 Units SubCUTAneous QHS    cholecalciferol (VITAMIN D3) tablet 2,000 Units  2,000 Units Oral DAILY    docusate sodium (COLACE) capsule 100 mg  100 mg Oral DAILY AFTER BREAKFAST    gabapentin (NEURONTIN) capsule 400 mg  400 mg Oral BID    oxyCODONE-acetaminophen (PERCOCET 7.5) 7.5-325 mg per tablet 1 Tab  1 Tab Oral Q4H PRN    pravastatin (PRAVACHOL) tablet 40 mg  40 mg Oral QHS    senna-docusate (PERICOLACE) 8.6-50 mg per tablet 2 Tab  2 Tab Oral PCD    acetaminophen (TYLENOL) tablet 500 mg  500 mg Oral Q6H PRN    allopurinol (ZYLOPRIM) tablet 100 mg  100 mg Oral DAILY    clotrimazole (MYCELEX) frances 10 mg  10 mg Oral 5XD    folic acid (FOLVITE) tablet 1 mg  1 mg Oral DAILY    ondansetron (ZOFRAN) injection 4 mg  4 mg IntraVENous Q8H PRN    glucose chewable tablet 16 g  16 g Oral PRN    glucagon (GLUCAGEN) injection 1 mg  1 mg IntraMUSCular PRN    dextrose (D50W) injection syrg 12.5-25 g  25-50 mL IntraVENous PRN       Review of Systems:      Complete 10-point review of systems were obtained and discussed in length  with the patient. Complete review of systems was negative/unremarkable  except as mentioned in HPI section. Data Review:    Labs: Results:       Chemistry Recent Labs      05/01/17 0450 04/30/17   0400   GLU  63*  61*   NA  135*  135*   K  3.9  3.6   CL  100  99*   CO2  27  28   BUN  38*  37*   CREA  3.37*  3.06*   CA  8.1*  8.1*   AGAP  8  8   BUCR  11*  12      CBC w/Diff Recent Labs      05/01/17 0450 04/30/17   0400   WBC  8.8  8.0   RBC  3.16*  3.06*   HGB  8.2*  8.0*   HCT  24.6*  24.1*   PLT  188  190   GRANS  83*  74   LYMPH  7*  17*   EOS  2  2      Coagulation No results for input(s): PTP, INR, APTT in the last 72 hours. No lab exists for component: INREXT    Iron/Ferritin No results for input(s): IRON in the last 72 hours. No lab exists for component: TIBCCALC   BNP No results for input(s): BNPP in the last 72 hours. Cardiac Enzymes No results for input(s): CPK, CKND1, CARLI in the last 72 hours. No lab exists for component: CKRMB, TROIP   Liver Enzymes No results for input(s): TP, ALB, TBIL, AP, SGOT, GPT in the last 72 hours. No lab exists for component: DBIL   Thyroid Studies Lab Results   Component Value Date/Time    TSH 0.51 04/20/2017 07:26 PM         EKG: unchanged from previous tracings.     Physical Assessment:     Visit Vitals    /61 (BP 1 Location: Left arm, BP Patient Position: At rest)    Pulse 69    Temp 97.9 °F (36.6 °C)    Resp 16    Ht 5' 7\" (1.702 m)    Wt 111.3 kg (245 lb 6 oz)    SpO2 97%    Breastfeeding No    BMI 38.43 kg/m2     Weight change:     Intake/Output Summary (Last 24 hours) at 05/01/17 1450  Last data filed at 05/01/17 0400   Gross per 24 hour   Intake                0 ml   Output              525 ml   Net             -525 ml     Physical Exam:   General: comfortable, no acute distress   HEENT sclera anicteric, supple neck, no thyromegaly  CVS: S1S2 heard,  no rub  RS: + air entry b/l,   Abd: Soft, Non tender,  Neuro: awake  Extrm: + edema, no cyanosis, clubbing   Skin: no visible  Rash  Musculoskeletal: No gross joints or bone deformities     Procedures/imaging: see electronic medical records for all procedures, Xrays and details which were not copied into this note but were reviewed prior to creation of [place] MD  May 1, 2017  [facility]  Office [phone]

## 2017-05-01 NOTE — PROGRESS NOTES
Infectious Disease Progress Note    Requested by: Dr. Juarez Garcia, dr. Bharathi Jimenez    Reason: sepsis, acute paraplegia    Current abx Prior abx   Ceftriaxone since 4/24 Cefepime 4/20-4/21  vancomycin  4/20-4/24  Gentamicin 4/21-4/24  Metronidazole  4/20-4/24     Lines:   PICC RUE 4/26    Assessment :    77 y.o., right handed female, with an established history of hypertension, complete heart block with permanent pacemaker placed, diabetes mellitus, diabetic peripheral neuropathy, obesity, admitted to [facility] on 4/20/17. Clinical presentation consistent with  Sepsis (POA)  secondary to group B streptococcus bloodstream infection (positive blood cultures 4/20, negative blood cultures 4/21). Most likely source of bloodstream infection is infected right psoas hematoma/abscess. 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 No signs of neurological recovery on today's exam     2 week h/o pain at the site of abdominal pacemaker, tenderness at the site - however, patient doesn't have erythema at the site of pacemaker. No fluid collection noted at pacemaker pocket site per usg 4/24. Quick clearance of bacteremia would argue against endocarditis/endovascular infection. At this time risk of removal of pacemaker/general pocket change exceed the benefit. Discussed with cardiology. Would recommend close monitoring. Abdominal usg 4/24 doesn't reveal evidence of abscess/fluid collection at the site of pacemaker pocket. Enterococcus in urine cultures 4/20 likely colonizer    Clinically better. Recommendations:    1. cont ceftriaxone till 6/4/17  2. drain removal per dr. Jason Camejo, IR. Above plan was discussed in details with patient, rn. . Please call me if any further questions or concerns. Will continue to participate in the care of this patient. subjective:    Feels better. Denies cp, sob. Unable to move legs. decreased pain at the site of pacemaker left abdomen. No nausea, vomiting. No new rash/itching/joint pain/back pain. Home Medication List    Details   gabapentin (NEURONTIN) 400 mg capsule Take 1 Cap by mouth two (2) times a day. Indications: NEUROPATHIC PAIN  Qty: 30 Cap, Refills: 0    Associated Diagnoses: Iliopsoas muscle hematoma, right, subsequent encounter      cholecalciferol (VITAMIN D3) 1,000 unit tablet Take 2 Tabs by mouth daily. Indications: PREVENTION OF VITAMIN D DEFICIENCY  Qty: 30 Tab, Refills: 0      SITagliptin (JANUVIA) 50 mg tablet Take 50 mg by mouth daily. Indications: type 2 diabetes mellitus      potassium chloride (KLOR-CON M20) 20 mEq tablet Take 20 mEq by mouth two (2) times a day. Indications: HYPOKALEMIA PREVENTION      ondansetron (ZOFRAN ODT) 4 mg disintegrating tablet Take 4 mg by mouth every eight (8) hours as needed for Nausea. cyclobenzaprine (FLEXERIL) 10 mg tablet Take 10 mg by mouth as needed for Muscle Spasm(s). Indications: MUSCLE SPASM      carvedilol (COREG) 6.25 mg tablet Take 1 Tab by mouth two (2) times daily (with meals). Indications: hypertension  Qty: 30 Tab, Refills: 0    Associated Diagnoses: Benign hypertensive heart disease with systolic CHF, NYHA class 2 (HCC)      acetaminophen (TYLENOL) 325 mg tablet Take 2 Tabs by mouth every four (4) hours as needed (for fever or pain level less than 5/10). Indications: Fever, Pain  Qty: 30 Tab, Refills: 0    Associated Diagnoses: Iliopsoas muscle hematoma, right, subsequent encounter      allopurinol (ZYLOPRIM) 100 mg tablet Take 1 Tab by mouth daily. Indications: GOUT  Qty: 15 Tab, Refills: 0    Associated Diagnoses: Chronic gout, unspecified cause, unspecified site      insulin glargine (LANTUS) 100 unit/mL injection 15 Units by SubCUTAneous route nightly.  Indications: type 2 diabetes mellitus  Qty: 1 Vial, Refills: 0    Associated Diagnoses: Type 2 diabetes mellitus with diabetic neuropathy, with long-term current use of insulin (HCC)      docusate sodium (COLACE) 100 mg capsule Take 1 Cap by mouth daily (after breakfast). Indications: Constipation  Qty: 15 Cap, Refills: 0      senna-docusate (PERICOLACE) 8.6-50 mg per tablet Take 2 Tabs by mouth daily (after dinner). Indications: Constipation  Qty: 30 Tab, Refills: 0      oxyCODONE-acetaminophen (PERCOCET 7.5) 7.5-325 mg per tablet Take 1 Tab by mouth every four (4) hours as needed (for pain level greater than 4/10). Max Daily Amount: 6 Tabs. Indications: Pain  Qty: 50 Tab, Refills: 0    Associated Diagnoses: Iliopsoas muscle hematoma, right, subsequent encounter      bumetanide (BUMEX) 1 mg tablet TAKE 1 TABLET TWICE A DAY  Qty: 180 Tab, Refills: 1      pravastatin (PRAVACHOL) 40 mg tablet Take 40 mg by mouth nightly. Indications: DYSLIPIDEMIA      ferrous sulfate 325 mg (65 mg iron) tablet Take 1 Tab by mouth two (2) times daily (with meals).   Qty: 30 Tab, Refills: 0      insulin aspart (NOVOLOG) 100 unit/mL injection INITIATE INSULIN CORRECTIVE PROTOCOL (HR): Normal Insulin Sensitivity  For Blood Sugar (mg/dL) of:    Less than 150 =   0 units          150 -199 =   2 units 200 -249 =   4 units 250 -299 =   6 units 300 -349 =   8 units 350 and above =   10 units If 2 glucose readings are above 200 mg/dL  Qty: 10 mL, Refills: 6             Current Facility-Administered Medications   Medication Dose Route Frequency    insulin lispro (HUMALOG) injection   SubCUTAneous AC&HS    0.9% sodium chloride infusion 250 mL  250 mL IntraVENous PRN    famotidine (PEPCID) tablet 20 mg  20 mg Oral BID    cefTRIAXone (ROCEPHIN) 2 g in 0.9% sodium chloride (MBP/ADV) 50 mL MBP  2 g IntraVENous Q24H    insulin glargine (LANTUS) injection 20 Units  20 Units SubCUTAneous DAILY    diphenhydrAMINE (BENADRYL) capsule 50 mg  50 mg Oral Q4H PRN    polyethylene glycol (MIRALAX) packet 17 g  17 g Oral TID    insulin glargine (LANTUS) injection 20 Units 20 Units SubCUTAneous QHS    cholecalciferol (VITAMIN D3) tablet 2,000 Units  2,000 Units Oral DAILY    docusate sodium (COLACE) capsule 100 mg  100 mg Oral DAILY AFTER BREAKFAST    gabapentin (NEURONTIN) capsule 400 mg  400 mg Oral BID    oxyCODONE-acetaminophen (PERCOCET 7.5) 7.5-325 mg per tablet 1 Tab  1 Tab Oral Q4H PRN    pravastatin (PRAVACHOL) tablet 40 mg  40 mg Oral QHS    senna-docusate (PERICOLACE) 8.6-50 mg per tablet 2 Tab  2 Tab Oral PCD    acetaminophen (TYLENOL) tablet 500 mg  500 mg Oral Q6H PRN    allopurinol (ZYLOPRIM) tablet 100 mg  100 mg Oral DAILY    clotrimazole (MYCELEX) frances 10 mg  10 mg Oral 5XD    folic acid (FOLVITE) tablet 1 mg  1 mg Oral DAILY    ondansetron (ZOFRAN) injection 4 mg  4 mg IntraVENous Q8H PRN    glucose chewable tablet 16 g  16 g Oral PRN    glucagon (GLUCAGEN) injection 1 mg  1 mg IntraMUSCular PRN    dextrose (D50W) injection syrg 12.5-25 g  25-50 mL IntraVENous PRN       Allergies: Vancomycin; Ampicillin; Bactrim [sulfamethoxazole-trimethoprim]; Blueberry; Ciprofloxacin; Codeine; Crestor [rosuvastatin]; Darvocet a500 [propoxyphene n-acetaminophen]; Demerol [meperidine]; Levaquin [levofloxacin]; Lipitor [atorvastatin]; Magnesium oxide; Minocin [minocycline]; Pcn [penicillins]; Pravachol [pravastatin]; Sulfa (sulfonamide antibiotics); Ultracet [tramadol-acetaminophen]; Vicodin [hydrocodone-acetaminophen]; Vytorin 10-10 [ezetimibe-simvastatin]; and Percodan [oxycodone hcl-oxycodone-asa]    Temp (24hrs), Av.8 °F (37.1 °C), Min:97.9 °F (36.6 °C), Max:100 °F (37.8 °C)    Visit Vitals    /61 (BP 1 Location: Left arm, BP Patient Position: At rest)    Pulse 69    Temp 97.9 °F (36.6 °C)    Resp 16    Ht 5' 7\" (1.702 m)    Wt 111.3 kg (245 lb 6 oz)    SpO2 97%    Breastfeeding No    BMI 38.43 kg/m2       ROS: patient unable to communicate fluently. Detailed ros not feasible.     Physical Exam:    General: Well developed, well nourished female laying on the bed AAOx3 NAD    General:   awake alert and oriented   HEENT:  Normocephalic, atraumatic, PERRL, EOMI, no scleral icterus or pallor; no conjunctival hemmohage;  nasal and oral mucous are moist and without evidence of lesions. Neck supple, no bruits. Lymph Nodes:   no cervical, axillary or inguinal adenopathy   Lungs:   non-labored, bilaterally clear to auscultation- no crackles wheezes rales or rhonchi   Heart:   s1 and s2 irregular; no rubs or gallops, no edema, + pedal pulses   Abdomen:  soft, non-distended, active bowel sounds, no hepatomegaly, no splenomegaly. Decreased tenderness over the left abdominal pacemaker, no overlying erythema or fluctuance   Genitourinary:  deferred   Extremities:   no clubbing, cyanosis; no joint effusions or swelling; muscle mass appropriate for age   Neurologic:  No gross focal sensory abnormalities; 5/5 muscle strength to upper extremities. 0/5 strength in lower extremities. Cranial nerves intact                        Skin:  Surgical scars abdomen, left neck well healed   Back:  minimal lumbar spine tenderness around L5, no paraspinal muscle guarding or rigidity, no CVA tenderness     Psychiatric:  No suicidal or homicidal ideations, appropriate mood and affect         Labs: Results:   Chemistry Recent Labs      05/01/17   0450  04/30/17   0400   GLU  63*  61*   NA  135*  135*   K  3.9  3.6   CL  100  99*   CO2  27  28   BUN  38*  37*   CREA  3.37*  3.06*   CA  8.1*  8.1*   AGAP  8  8   BUCR  11*  12      CBC w/Diff Recent Labs      05/01/17   0450  04/30/17   0400   WBC  8.8  8.0   RBC  3.16*  3.06*   HGB  8.2*  8.0*   HCT  24.6*  24.1*   PLT  188  190   GRANS  83*  74   LYMPH  7*  17*   EOS  2  2      Microbiology No results for input(s): CULT in the last 72 hours.        RADIOLOGY:    All available imaging studies/reports in [facility] for this admission were reviewed    Dr. Shahla Black, Infectious Disease Specialist  [phone]  May 1, 2017  3:49 PM

## 2017-05-01 NOTE — PROGRESS NOTES
[facility] Hospitalist [facility]  Progress Note    Patient: Cuate Shaikh Age: 77 y.o. : 1950 MR#: [id number] SSN: [id number]  Date/Time: 2017     Subjective:     No chills   No SOB, No NVD  Feels same   No to little sensations in legs     Assessment/Plan:    77 yr old Female with PMH of iliopsoas hematoma , patient developed sudden onset of Right  leg weakness , patient evaluated by rehab MD and was transferred to medical service for further management .  Dr Deanna Felipe - spine surgery consulted , neurology consulted , patient is admitted to ICU        1 Right Leg weakness   - Infected PSOA hematoma   - continue IV antibiotics   - Follow with ID     2 CMO - LBBB, - followed by cardiology   - H/O PACE Maker placement for CHB       3 DM2  - Her po intake is unpredictable - one reading on 5117 AM - hypoglycemia   - continue accu checks q 6 hrs with coverage      4 H/O DVT off  AC - due to Bleed     5 Elevated trop - From stress , S/B cardi - Continue current treatments     6 Acute blood loss anemia   - s/p BT one unit   - Post transfusion H/h remained stable       7 UTI- POA   - Continue current antibiotics   -  urine culture - E Fecalis     8 LISBETH   - Prerenal , IVF   - Renal consult , Case D/W attending   - LAST ECHO EF 45 %        D/w patient and her family in room with her   On discharge they would like to go yris       Case discussed with:  [x]Patient  [x]Family  [x]Nursing  []Case Management  DVT Prophylaxis:  []Lovenox  []Hep SQ  [x]SCDs  []Coumadin   []On Heparin gtt    Patient Active Problem List   Diagnosis Code    Nonischemic cardiomyopathy ([facility] I42.8    History of complete heart block Z86.79    Biventricular implantable cardioverter-defibrillator in situ Z95.810    Left bundle branch block (LBBB) on electrocardiogram I44.7    Type 2 diabetes mellitus with diabetic neuropathy (HCC) E11.40    Dyslipidemia E78.5    Diabetic neuropathy associated with type 2 diabetes mellitus ([facility] E11.40    Obstructive sleep apnea on CPAP G47.33, Z99.89    AICD generator infection ([facility] [address] T82. 7XXA    Difficult airway for intubation T88. 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 1XXA    Impaired mobility and ADLs Z74.09    History of Coumadin therapy Z79.01    Gout M10.9    Acute paraplegia ([facility]) G82.20    Sepsis ([facility] [address] A41.9    Psoas abscess, right ([facility] [address] K68.12    Krueger catheter in place on admission Z96.0    Urinary tract infection due to Enterococcus N39.0, B95.2    Group B streptococcal infection A49.1       Objective:   VS:   Visit Vitals    /61 (BP 1 Location: Left arm, BP Patient Position: At rest)    Pulse 69    Temp 97.9 °F (36.6 °C)    Resp 16    Ht 5' 7\" (1.702 m)    Wt 111.3 kg (245 lb 6 oz)    SpO2 97%    Breastfeeding No    BMI 38.43 kg/m2      Tmax/24hrs: Temp (24hrs), Av.8 °F (37.1 °C), Min:97.9 °F (36.6 °C), Max:100 °F (37.8 °C)  IOBRIEF    Intake/Output Summary (Last 24 hours) at 17 1330  Last data filed at 17 0400   Gross per 24 hour   Intake               50 ml   Output             1450 ml   Net            -1400 ml       General:  Ill appearing   HEENT:  NC, Atraumatic. PERRLA, anicteric sclerae. Pulmonary:  CTA Bilaterally. No Wheezing/Rhonchi/Rales. Cardiovascular: Regular rate and Rhythm. GI:  Soft, Non distended, Non tender. + Bowel sounds. Extremities:  No edema, cyanosis, clubbing. No calf tenderness.    Psych:  Not anxious or agitated. Neurologic: Grossly - Motor and Sensory functions are intact .  No Anxiety , calm , no Agitation         Medications:   Current Facility-Administered Medications   Medication Dose Route Frequency    insulin lispro (HUMALOG) injection   SubCUTAneous AC&HS    0.9% sodium chloride infusion 250 mL  250 mL IntraVENous PRN    famotidine (PEPCID) tablet 20 mg  20 mg Oral BID    cefTRIAXone (ROCEPHIN) 2 g in 0.9% sodium chloride (MBP/ADV) 50 mL MBP  2 g IntraVENous Q24H    insulin glargine (LANTUS) injection 20 Units  20 Units SubCUTAneous DAILY    diphenhydrAMINE (BENADRYL) capsule 50 mg  50 mg Oral Q4H PRN    polyethylene glycol (MIRALAX) packet 17 g  17 g Oral TID    insulin glargine (LANTUS) injection 20 Units  20 Units SubCUTAneous QHS    cholecalciferol (VITAMIN D3) tablet 2,000 Units  2,000 Units Oral DAILY    docusate sodium (COLACE) capsule 100 mg  100 mg Oral DAILY AFTER BREAKFAST    gabapentin (NEURONTIN) capsule 400 mg  400 mg Oral BID    oxyCODONE-acetaminophen (PERCOCET 7.5) 7.5-325 mg per tablet 1 Tab  1 Tab Oral Q4H PRN    pravastatin (PRAVACHOL) tablet 40 mg  40 mg Oral QHS    senna-docusate (PERICOLACE) 8.6-50 mg per tablet 2 Tab  2 Tab Oral PCD    acetaminophen (TYLENOL) tablet 500 mg  500 mg Oral Q6H PRN    allopurinol (ZYLOPRIM) tablet 100 mg  100 mg Oral DAILY    clotrimazole (MYCELEX) frances 10 mg  10 mg Oral 5XD    folic acid (FOLVITE) tablet 1 mg  1 mg Oral DAILY    ondansetron (ZOFRAN) injection 4 mg  4 mg IntraVENous Q8H PRN    glucose chewable tablet 16 g  16 g Oral PRN    glucagon (GLUCAGEN) injection 1 mg  1 mg IntraMUSCular PRN    dextrose (D50W) injection syrg 12.5-25 g  25-50 mL IntraVENous PRN       Labs:    Recent Results (from the past 24 hour(s))   GLUCOSE, POC    Collection Time: 04/30/17  4:36 PM   Result Value Ref Range    Glucose (POC) 131 (H) 70 - 110 mg/dL   GLUCOSE, POC    Collection Time: 04/30/17  9:17 PM   Result Value Ref Range Glucose (POC) 130 (H) 70 - 110 mg/dL   CBC WITH AUTOMATED DIFF    Collection Time: 05/01/17  4:50 AM   Result Value Ref Range    WBC 8.8 4.6 - 13.2 K/uL    RBC 3.16 (L) 4.20 - 5.30 M/uL    HGB 8.2 (L) 12.0 - 16.0 g/dL    HCT 24.6 (L) 35.0 - 45.0 %    MCV 77.8 74.0 - 97.0 FL    MCH 25.9 24.0 - 34.0 PG    MCHC 33.3 31.0 - 37.0 g/dL    RDW 16.4 (H) 11.6 - 14.5 %    PLATELET 948 239 - 295 K/uL    MPV 9.5 9.2 - 11.8 FL    NEUTROPHILS 83 (H) 42 - 75 %    BAND NEUTROPHILS 4 0 - 5 %    LYMPHOCYTES 7 (L) 20 - 51 %    MONOCYTES 4 2 - 9 %    EOSINOPHILS 2 0 - 5 %    BASOPHILS 0 0 - 3 %    NRBC 1.0 (H) 0  WBC    ABS. NEUTROPHILS 7.6 1.8 - 8.0 K/UL    ABS. LYMPHOCYTES 0.6 (L) 0.8 - 3.5 K/UL    ABS. MONOCYTES 0.4 0 - 1.0 K/UL    ABS. EOSINOPHILS 0.2 0.0 - 0.4 K/UL    ABS.  BASOPHILS 0.0 0.0 - 0.06 K/UL    DF MANUAL      PLATELET COMMENTS ADEQUATE PLATELETS      RBC COMMENTS HYPOCHROMIA  1+        RBC COMMENTS STOMATOCYTES  1+       METABOLIC PANEL, BASIC    Collection Time: 05/01/17  4:50 AM   Result Value Ref Range    Sodium 135 (L) 136 - 145 mmol/L    Potassium 3.9 3.5 - 5.5 mmol/L    Chloride 100 100 - 108 mmol/L    CO2 27 21 - 32 mmol/L    Anion gap 8 3.0 - 18 mmol/L    Glucose 63 (L) 74 - 99 mg/dL    BUN 38 (H) 7.0 - 18 MG/DL    Creatinine 3.37 (H) 0.6 - 1.3 MG/DL    BUN/Creatinine ratio 11 (L) 12 - 20      GFR est AA 17 (L) >60 ml/min/1.73m2    GFR est non-AA 14 (L) >60 ml/min/1.73m2    Calcium 8.1 (L) 8.5 - 10.1 MG/DL   GLUCOSE, POC    Collection Time: 05/01/17  7:16 AM   Result Value Ref Range    Glucose (POC) 67 (L) 70 - 110 mg/dL   GLUCOSE, POC    Collection Time: 05/01/17 11:34 AM   Result Value Ref Range    Glucose (POC) 132 (H) 70 - 110 mg/dL       Signed By: Olga Harrington MD     May 1, 2017

## 2017-05-01 NOTE — PROGRESS NOTES
[facility] - Progress Note  Admit Date: 4/20/2017    Assessment:     Hospital Problems  Date Reviewed: 4/20/2017          Codes Class Noted POA    * (Principal)Acute paraplegia ([facility] [address] ICD-10-CM: G82.20  ICD-9-CM: 344.1  4/20/2017 Yes        Sepsis ([facility] [address] ICD-10-CM: A41.9  ICD-9-CM: 038.9, 995.91  4/20/2017 Yes        Psoas abscess, right ([facility] [address] ICD-10-CM: K80.71  ICD-9-CM: 567.31  4/20/2017 Yes        Krueger catheter in place on admission ICD-10-CM: Z96.0  ICD-9-CM: V45.89  4/20/2017 Yes        Urinary tract infection due to Enterococcus ICD-10-CM: N39.0, B95.2  ICD-9-CM: 599.0, 041.04  4/20/2017 Yes        Group B streptococcal infection ICD-10-CM: A49.1  ICD-9-CM: 041.02  4/20/2017 Yes        History of Coumadin therapy ICD-10-CM: Z79.01  ICD-9-CM: V58.61  Unknown Yes    Overview Signed 4/6/2017 10:35 PM by Yoel Tomlinson MD     Anticoagulation for chronic atrial fibrillation; Discontinued on 3/30/2017             Decreased calculated glomerular filtration rate (GFR) ICD-10-CM: R94.4  ICD-9-CM: 794.4  3/30/2017 Yes    Overview Signed 4/6/2017  5:47 PM by Yoel Tomlinson MD     Calculated GFR equivalent to that of CKD stage 3 = 30-59 ml/min             Iliopsoas muscle hematoma ICD-10-CM: S70.10XA  ICD-9-CM: 924.00  3/30/2017 Yes        Psoas hematoma, right, secondary to anticoagulant therapy ICD-10-CM: S30. 1XXA  ICD-9-CM: 924.9, E934.2  3/30/2017 Yes        Impaired mobility and ADLs ICD-10-CM: Z74.09  ICD-9-CM: 799.89  3/30/2017 Yes        Benign hypertensive heart disease with systolic CHF, NYHA class 2 (HCC) (Chronic) ICD-10-CM: I11.0, I50.20  ICD-9-CM: 402.11, 428.20, 428.0  9/5/2012 Yes        [facility]ICD generator infection ([facility] ICD-10-CM: T82. 7XXA  ICD-9-CM: 996.61  8/20/2012 Yes    Overview Signed 8/20/2012  6:13 PM by Aníbal Austin MD     S/p explant 4 leads 8/20/12             Type 2 diabetes mellitus with diabetic neuropathy (HCC) (Chronic) ICD-10-CM: E11.40  ICD-9-CM: 250.60, 357.2  6/28/2011 Yes              -Pacemaker pocket pain, cannot exclude seeding from abscess. Limited options given device dependence and obstruction. Treating conservatively. -Sepsis secondary to infected right psoas hematoma/epidural abscess with neurological compromise resulting in paraplegia. Now s/p drain by IR.  -Heart block s/p AICD 2005 with upgrade to BiV later that year, removed however in 2012 due to trauma and infection. New biventricular pacemaker LUQ 9/2012 by Dr. Herman Kay and revision 10/2012 due to Twiddler's syndrome. Pacemaker dependent. Normal function 3/2017. Patient does report discomfort left upper quadrant following fall last month. Right axillary vein obstruction by venogram 2012.   -Elevated troponin likely demand ischemia in setting of sepsis. -H/p transient nonischemic CMY with EF 45% (45-50% 6/2015 with patent coronaries on cath 1996). No ischemia on nuclear 6/2015.  -Acute kidney injury, improving  -Diabetes mellitus.  -Dyslipidemia. -H/o HTN. -LBBB. -HALI on CPAP. -H/o DVT. Plan:     Pain improving over pacemaker site. Will continue to follow clinically. Subjective:     No new complaints. Seen after therapy and tired.     Objective:      Patient Vitals for the past 8 hrs:   Temp Pulse Resp BP SpO2   05/01/17 0802 98.2 °F (36.8 °C) 84 20 105/65 98 %         Patient Vitals for the past 96 hrs:   Weight   05/01/17 0736 111.3 kg (245 lb 6 oz)   04/30/17 0528 111.3 kg (245 lb 4.8 oz)   04/28/17 0522 116.1 kg (256 lb)                    Intake/Output Summary (Last 24 hours) at 05/01/17 1215  Last data filed at 05/01/17 0400   Gross per 24 hour   Intake               50 ml   Output             1450 ml   Net            -1400 ml       Physical Exam:  General:  alert, cooperative, no distress, appears stated age  Neck:  nontender  Lungs:  clear to auscultation bilaterally  Heart:  regular rate and rhythm, S1, S2 normal, no murmur, click, rub or gallop  Abdomen:  abdomen is soft without significant tenderness, masses, organomegaly or guarding  Extremities:  Unable to move    Data Review:     Labs: Results:       Chemistry Recent Labs      05/01/17   0450  04/30/17   0400   GLU  63*  61*   NA  135*  135*   K  3.9  3.6   CL  100  99*   CO2  27  28   BUN  38*  37*   CREA  3.37*  3.06*   CA  8.1*  8.1*   AGAP  8  8   BUCR  11*  12      CBC w/Diff Recent Labs      05/01/17 0450  04/30/17   0400   WBC  8.8  8.0   RBC  3.16*  3.06*   HGB  8.2*  8.0*   HCT  24.6*  24.1*   PLT  188  190   GRANS  83*  74   LYMPH  7*  17*   EOS  2  2      Cardiac Enzymes No results found for: CPK, CKMMB, CKMB, RCK3, CKMBT, CKNDX, CKND1, CARLI, TROPT, TROIQ, AMBERLY, TROPT, TNIPOC, BNP, BNPP   Coagulation No results for input(s): PTP, INR, APTT in the last 72 hours. No lab exists for component: INREXT    Lipid Panel Lab Results   Component Value Date/Time    Cholesterol, total 154 07/24/2012 01:00 AM    HDL Cholesterol 48 07/24/2012 01:00 AM    LDL, calculated 90.4 07/24/2012 01:00 AM    VLDL, calculated 15.6 07/24/2012 01:00 AM    Triglyceride 78 07/24/2012 01:00 AM    CHOL/HDL Ratio 3.2 07/24/2012 01:00 AM      BNP Lab Results   Component Value Date/Time    B-type Natriuretic Peptide 111.9 11/19/2013 10:17 AM      Liver Enzymes No results for input(s): TP, ALB, TBIL, AP, SGOT, GPT in the last 72 hours.     No lab exists for component: DBIL   Digoxin    Thyroid Studies Lab Results   Component Value Date/Time    TSH 0.51 04/20/2017 07:26 PM          Signed By: Eligio Matute MD     May 1, 2017

## 2017-05-01 NOTE — PROGRESS NOTES
Problem: Mobility Impaired (Adult and Pediatric)  Goal: *Acute Goals and Plan of Care (Insert Text)  STGs to be addressed within 3 days:  1. Bed mobility: Supine to sit to supine MaxAx1 with HR for meals. 2. Activity tolerance: Tolerate EOB > 15 minutes for ADLs. 3. Transfers: SPT-->to chair max/mod A with LRAD for ADLs. 4. Pt demo fair seated balance in preparation for functional transfers. LTGs to be addressed within 7 days:  1. Transfers: SB-->to chair/wc max/mod A for ADLs. 2. Activity tolerance: Tolerated > 1 hr in chair for change of position. 3. Patient Education: Independent with HEP for home safety. PHYSICAL THERAPY RE-EVALUATION AND TREATMENT     Patient: Coy Lutzzen (87 y.o. female)  Date: 5/1/2017  Diagnosis: Acute paraplegia  Acute paraplegia (HCC) Acute paraplegia ([facility] [address] [facility]       Precautions: Fall, Skin      ASSESSMENT:  Pt presents today alert and agreeable to re-eval. Pt performed rolling x2 in each direction using BUE's clasped above head and using momentum to participate. Pt demonstrated increased ease of rolling towards R side as demonstrated by increased clearing of L scapula from bed. Pt required rest breaks between repetitions. Pt then demonstrated scooting with setup of PT placing bed in Trendelenburg and pt utilized BUE's on bed rails to scoot towards [address]. Pt then participated in BLE ROM to determine if pt able to push against resistance; unable to palpate mm contraction of HS's or glutes. Pt then positioned in quarter turn sidelying on R with sheet roll placed under RLE for optimal positioning. Pt does report she had some degree of ER when walking prior to this hospitalization. SCD's donned and pillows under B gastrocs, leaving heels floating and knees extended. Call bell by patients side. Pt will continue to benefit from therapy and is motivated to participate with PT in order to improve mobility within her functional capabilities.     Patient's progression toward goals since last assessment: Pt is progressing towards goals; all ongoing at this time       PLAN:  Goals have been updated based on progression since last assessment. Patient continues to benefit from skilled intervention to address the above impairments. Continue to follow the patient 1-2 times per day/4-7 days per week to address goals. Planned Interventions:  [X]     Bed Mobility Training          [X]     Neuromuscular Re-Education  [X]     Transfer Training                [ ]    Orthotic/Prosthetic Training  [X]     Gait Training                       [ ]     Modalities  [X]     Therapeutic Exercises       [ ]     Edema Management/Control  [X]     Therapeutic Activities         [X]     Patient and Family Training/Education  [ ]     Other (comment):  Discharge Recommendations: Rehab (would benefit from SCI unit specifically)  Further Equipment Recommendations for Discharge: N/A       G-CODES:      Mobility [id number] Current  CM= 80-99%   Goal  CK= 40-59%. The severity rating is based on the Level of Assistance required for Functional Mobility and ADLs. SUBJECTIVE:   Patient stated I feel okay. My legs are burning which is a good thing      OBJECTIVE DATA SUMMARY:   Critical Behavior:  Neurologic State: Alert  Orientation Level: Oriented X4  Cognition: Appropriate decision making  Safety/Judgement: Awareness of environment, Fall prevention      AROM & Strength: grossly decreased, non-functional  Sensation: pt can sense pressure, LT, and pain; pt reports these sensations are increased on RLE and also improves proximally with decreased sensations distally; little to no sensation at B feet at this time  Pt also reports burning sensation BLE's  Functional Mobility Training:  Bed Mobility:  Rolling: Maximum assistance; Total assistance (Pt with improve clearance of scapula from bed towards R)    performed rolling x2 each direction for carryover of technique; pt with BUE's clasped above head and worked to use momentum to participate in rolling  Scooting: Setup (pt using BUE's on bed rails and bed in trendelenberg)  Pain:   2/10 pre and post abdominal muscle soreness    Activity Tolerance:   Pt tolerated activity well and was left resting in bed with call bell by her side. Pt reports some muscle soreness of her abdomen. Please refer to the flowsheet for vital signs taken during this treatment.   After treatment:   [X]  Patient left in no apparent distress sitting up in chair  [X]  Patient left in no apparent distress in bed  [X]  Call bell left within reach  [ ]  Nursing notified  [ ]  Caregiver present  [ ]  Bed alarm activated     Breanne Jones, PT   Time Calculation: 29 mins

## 2017-05-02 LAB
ANION GAP BLD CALC-SCNC: 7 MMOL/L (ref 3–18)
APPEARANCE UR: ABNORMAL
BACTERIA SPEC CULT: NORMAL
BACTERIA URNS QL MICRO: ABNORMAL /HPF
BILIRUB UR QL: NEGATIVE
BUN SERPL-MCNC: 37 MG/DL (ref 7–18)
BUN/CREAT SERPL: 11 (ref 12–20)
CALCIUM SERPL-MCNC: 7.6 MG/DL (ref 8.5–10.1)
CHLORIDE SERPL-SCNC: 99 MMOL/L (ref 100–108)
CK SERPL-CCNC: 91 U/L (ref 26–192)
CO2 SERPL-SCNC: 28 MMOL/L (ref 21–32)
COLOR UR: YELLOW
CREAT SERPL-MCNC: 3.4 MG/DL (ref 0.6–1.3)
EOSINOPHIL #/AREA URNS HPF: NORMAL /[HPF]
EPITH CASTS URNS QL MICRO: ABNORMAL /LPF (ref 0–5)
GLUCOSE BLD STRIP.AUTO-MCNC: 103 MG/DL (ref 70–110)
GLUCOSE BLD STRIP.AUTO-MCNC: 104 MG/DL (ref 70–110)
GLUCOSE BLD STRIP.AUTO-MCNC: 165 MG/DL (ref 70–110)
GLUCOSE BLD STRIP.AUTO-MCNC: 94 MG/DL (ref 70–110)
GLUCOSE SERPL-MCNC: 90 MG/DL (ref 74–99)
GLUCOSE UR STRIP.AUTO-MCNC: NEGATIVE MG/DL
HGB UR QL STRIP: ABNORMAL
KETONES UR QL STRIP.AUTO: NEGATIVE MG/DL
LEUKOCYTE ESTERASE UR QL STRIP.AUTO: ABNORMAL
NITRITE UR QL STRIP.AUTO: NEGATIVE
PH UR STRIP: 5 [PH] (ref 5–8)
POTASSIUM SERPL-SCNC: 4.5 MMOL/L (ref 3.5–5.5)
PROT UR STRIP-MCNC: 30 MG/DL
RBC #/AREA URNS HPF: ABNORMAL /HPF (ref 0–5)
SERVICE CMNT-IMP: NORMAL
SODIUM SERPL-SCNC: 134 MMOL/L (ref 136–145)
SODIUM UR-SCNC: 40 MMOL/L (ref 20–110)
SP GR UR REFRACTOMETRY: 1.02 (ref 1–1.03)
UROBILINOGEN UR QL STRIP.AUTO: 0.2 EU/DL (ref 0.2–1)
WBC URNS QL MICRO: ABNORMAL /HPF (ref 0–4)
YEAST URNS QL MICRO: ABNORMAL

## 2017-05-02 PROCEDURE — 82962 GLUCOSE BLOOD TEST: CPT

## 2017-05-02 PROCEDURE — 74011250637 HC RX REV CODE- 250/637: Performed by: INTERNAL MEDICINE

## 2017-05-02 PROCEDURE — 97110 THERAPEUTIC EXERCISES: CPT

## 2017-05-02 PROCEDURE — 87205 SMEAR GRAM STAIN: CPT | Performed by: INTERNAL MEDICINE

## 2017-05-02 PROCEDURE — 84300 ASSAY OF URINE SODIUM: CPT | Performed by: INTERNAL MEDICINE

## 2017-05-02 PROCEDURE — 74011250636 HC RX REV CODE- 250/636: Performed by: INTERNAL MEDICINE

## 2017-05-02 PROCEDURE — 74011000258 HC RX REV CODE- 258: Performed by: INTERNAL MEDICINE

## 2017-05-02 PROCEDURE — 80048 BASIC METABOLIC PNL TOTAL CA: CPT | Performed by: INTERNAL MEDICINE

## 2017-05-02 PROCEDURE — 97530 THERAPEUTIC ACTIVITIES: CPT

## 2017-05-02 PROCEDURE — 74011636637 HC RX REV CODE- 636/637: Performed by: HOSPITALIST

## 2017-05-02 PROCEDURE — 81001 URINALYSIS AUTO W/SCOPE: CPT | Performed by: INTERNAL MEDICINE

## 2017-05-02 PROCEDURE — 74011636637 HC RX REV CODE- 636/637: Performed by: INTERNAL MEDICINE

## 2017-05-02 PROCEDURE — 82550 ASSAY OF CK (CPK): CPT | Performed by: INTERNAL MEDICINE

## 2017-05-02 PROCEDURE — 65660000000 HC RM CCU STEPDOWN

## 2017-05-02 PROCEDURE — 74011250637 HC RX REV CODE- 250/637: Performed by: HOSPITALIST

## 2017-05-02 RX ADMIN — CLOTRIMAZOLE 10 MG: 10 LOZENGE ORAL at 10:57

## 2017-05-02 RX ADMIN — INSULIN LISPRO 3 UNITS: 100 INJECTION, SOLUTION INTRAVENOUS; SUBCUTANEOUS at 10:55

## 2017-05-02 RX ADMIN — FAMOTIDINE 20 MG: 20 TABLET ORAL at 08:05

## 2017-05-02 RX ADMIN — INSULIN GLARGINE 20 UNITS: 100 INJECTION, SOLUTION SUBCUTANEOUS at 08:05

## 2017-05-02 RX ADMIN — POLYETHYLENE GLYCOL 3350 17 G: 17 POWDER, FOR SOLUTION ORAL at 08:00

## 2017-05-02 RX ADMIN — CLOTRIMAZOLE 10 MG: 10 LOZENGE ORAL at 21:40

## 2017-05-02 RX ADMIN — GABAPENTIN 400 MG: 400 CAPSULE ORAL at 17:17

## 2017-05-02 RX ADMIN — FOLIC ACID 1 MG: 1 TABLET ORAL at 08:00

## 2017-05-02 RX ADMIN — PRAVASTATIN SODIUM 40 MG: 20 TABLET ORAL at 21:40

## 2017-05-02 RX ADMIN — DOCUSATE SODIUM 100 MG: 100 CAPSULE, LIQUID FILLED ORAL at 08:00

## 2017-05-02 RX ADMIN — CLOTRIMAZOLE 10 MG: 10 LOZENGE ORAL at 17:17

## 2017-05-02 RX ADMIN — CHOLECALCIFEROL TAB 25 MCG (1000 UNIT) 2000 UNITS: 25 TAB at 08:00

## 2017-05-02 RX ADMIN — CEFTRIAXONE SODIUM 2 G: 2 INJECTION, POWDER, FOR SOLUTION INTRAMUSCULAR; INTRAVENOUS at 10:57

## 2017-05-02 RX ADMIN — ALLOPURINOL 100 MG: 100 TABLET ORAL at 08:00

## 2017-05-02 RX ADMIN — ACETAMINOPHEN 500 MG: 500 TABLET, COATED ORAL at 21:40

## 2017-05-02 RX ADMIN — SODIUM CHLORIDE 75 ML/HR: 900 INJECTION, SOLUTION INTRAVENOUS at 23:57

## 2017-05-02 RX ADMIN — CLOTRIMAZOLE 10 MG: 10 LOZENGE ORAL at 14:54

## 2017-05-02 RX ADMIN — Medication 2 TABLET: at 17:16

## 2017-05-02 RX ADMIN — GABAPENTIN 400 MG: 400 CAPSULE ORAL at 08:00

## 2017-05-02 RX ADMIN — CLOTRIMAZOLE 10 MG: 10 LOZENGE ORAL at 07:13

## 2017-05-02 RX ADMIN — SODIUM CHLORIDE 75 ML/HR: 900 INJECTION, SOLUTION INTRAVENOUS at 07:59

## 2017-05-02 NOTE — ROUTINE PROCESS
Bedside, Verbal and Written shift change report given to Manoj Cortez (oncoming nurse) by Rosana SANCHEZ(offgoing nurse). Report included the following information SBAR, Kardex, and MAR. Hourly rounding and  chart checks completed.

## 2017-05-02 NOTE — ROUTINE PROCESS
Bedside shift change report given to Fabian Zambrano (oncoming nurse) by Jack Ortiz RN (offgoing nurse). Report included the following information SBAR, Kardex and Intake/Output.

## 2017-05-02 NOTE — PROGRESS NOTES
Infectious Disease Progress Note    Requested by: Dr. Jorge Khan, dr. Kristina Milan    Reason: sepsis, acute paraplegia    Current abx Prior abx   Ceftriaxone since 4/24 Cefepime 4/20-4/21  vancomycin  4/20-4/24  Gentamicin 4/21-4/24  Metronidazole  4/20-4/24     Lines:   PICC RUE 4/26    Assessment :    77 y.o., right handed female, with an established history of hypertension, complete heart block with permanent pacemaker placed, diabetes mellitus, diabetic peripheral neuropathy, obesity, admitted to [facility] on 4/20/17. Clinical presentation consistent with  Sepsis (POA)  secondary to group B streptococcus bloodstream infection (positive blood cultures 4/20, negative blood cultures 4/21). Most likely source of bloodstream infection is infected right psoas hematoma/abscess. 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 No signs of neurological recovery on today's exam     2 week h/o pain at the site of abdominal pacemaker, tenderness at the site - however, patient doesn't have erythema at the site of pacemaker. No fluid collection noted at pacemaker pocket site per usg 4/24. Quick clearance of bacteremia would argue against endocarditis/endovascular infection. At this time risk of removal of pacemaker/general pocket change exceed the benefit. Discussed with cardiology. Would recommend close monitoring. Abdominal usg 4/24 doesn't reveal evidence of abscess/fluid collection at the site of pacemaker pocket. Enterococcus in urine cultures 4/20 likely colonizer    Clinically better. Acute renal failure: nephrology consult appreciated. Difficult to determine exact etiology of ARF at this time. Await bmp from today      Recommendations:    1. cont ceftriaxone till 6/4/17  2. Recommend IR re evaluation when drain output less than 10cc (discussed with dr. Michelle Mercado)  3.  F/u renal function    Above plan was discussed in details with patient. . Please call me if any further questions or concerns. Will continue to participate in the care of this patient. subjective:    Feels better. Denies cp, sob. Unable to move legs. decreased pain at the site of pacemaker left abdomen. No nausea, vomiting. No new rash/itching/joint pain/back pain. Home Medication List    Details   gabapentin (NEURONTIN) 400 mg capsule Take 1 Cap by mouth two (2) times a day. Indications: NEUROPATHIC PAIN  Qty: 30 Cap, Refills: 0    Associated Diagnoses: Iliopsoas muscle hematoma, right, subsequent encounter      cholecalciferol (VITAMIN D3) 1,000 unit tablet Take 2 Tabs by mouth daily. Indications: PREVENTION OF VITAMIN D DEFICIENCY  Qty: 30 Tab, Refills: 0      SITagliptin (JANUVIA) 50 mg tablet Take 50 mg by mouth daily. Indications: type 2 diabetes mellitus      potassium chloride (KLOR-CON M20) 20 mEq tablet Take 20 mEq by mouth two (2) times a day. Indications: HYPOKALEMIA PREVENTION      ondansetron (ZOFRAN ODT) 4 mg disintegrating tablet Take 4 mg by mouth every eight (8) hours as needed for Nausea. cyclobenzaprine (FLEXERIL) 10 mg tablet Take 10 mg by mouth as needed for Muscle Spasm(s). Indications: MUSCLE SPASM      carvedilol (COREG) 6.25 mg tablet Take 1 Tab by mouth two (2) times daily (with meals). Indications: hypertension  Qty: 30 Tab, Refills: 0    Associated Diagnoses: Benign hypertensive heart disease with systolic CHF, NYHA class 2 (HCC)      acetaminophen (TYLENOL) 325 mg tablet Take 2 Tabs by mouth every four (4) hours as needed (for fever or pain level less than 5/10). Indications: Fever, Pain  Qty: 30 Tab, Refills: 0    Associated Diagnoses: Iliopsoas muscle hematoma, right, subsequent encounter      allopurinol (ZYLOPRIM) 100 mg tablet Take 1 Tab by mouth daily.  Indications: GOUT  Qty: 15 Tab, Refills: 0    Associated Diagnoses: Chronic gout, unspecified cause, unspecified site insulin glargine (LANTUS) 100 unit/mL injection 15 Units by SubCUTAneous route nightly. Indications: type 2 diabetes mellitus  Qty: 1 Vial, Refills: 0    Associated Diagnoses: Type 2 diabetes mellitus with diabetic neuropathy, with long-term current use of insulin (HCC)      docusate sodium (COLACE) 100 mg capsule Take 1 Cap by mouth daily (after breakfast). Indications: Constipation  Qty: 15 Cap, Refills: 0      senna-docusate (PERICOLACE) 8.6-50 mg per tablet Take 2 Tabs by mouth daily (after dinner). Indications: Constipation  Qty: 30 Tab, Refills: 0      oxyCODONE-acetaminophen (PERCOCET 7.5) 7.5-325 mg per tablet Take 1 Tab by mouth every four (4) hours as needed (for pain level greater than 4/10). Max Daily Amount: 6 Tabs. Indications: Pain  Qty: 50 Tab, Refills: 0    Associated Diagnoses: Iliopsoas muscle hematoma, right, subsequent encounter      bumetanide (BUMEX) 1 mg tablet TAKE 1 TABLET TWICE A DAY  Qty: 180 Tab, Refills: 1      pravastatin (PRAVACHOL) 40 mg tablet Take 40 mg by mouth nightly. Indications: DYSLIPIDEMIA      ferrous sulfate 325 mg (65 mg iron) tablet Take 1 Tab by mouth two (2) times daily (with meals).   Qty: 30 Tab, Refills: 0      insulin aspart (NOVOLOG) 100 unit/mL injection INITIATE INSULIN CORRECTIVE PROTOCOL (HR): Normal Insulin Sensitivity  For Blood Sugar (mg/dL) of:    Less than 150 =   0 units          150 -199 =   2 units 200 -249 =   4 units 250 -299 =   6 units 300 -349 =   8 units 350 and above =   10 units If 2 glucose readings are above 200 mg/dL  Qty: 10 mL, Refills: 6             Current Facility-Administered Medications   Medication Dose Route Frequency    famotidine (PEPCID) tablet 20 mg  20 mg Oral DAILY    0.9% sodium chloride infusion  75 mL/hr IntraVENous CONTINUOUS    insulin lispro (HUMALOG) injection   SubCUTAneous AC&HS    0.9% sodium chloride infusion 250 mL  250 mL IntraVENous PRN    cefTRIAXone (ROCEPHIN) 2 g in 0.9% sodium chloride (MBP/ADV) 50 mL MBP  2 g IntraVENous Q24H    insulin glargine (LANTUS) injection 20 Units  20 Units SubCUTAneous DAILY    diphenhydrAMINE (BENADRYL) capsule 50 mg  50 mg Oral Q4H PRN    polyethylene glycol (MIRALAX) packet 17 g  17 g Oral TID    cholecalciferol (VITAMIN D3) tablet 2,000 Units  2,000 Units Oral DAILY    docusate sodium (COLACE) capsule 100 mg  100 mg Oral DAILY AFTER BREAKFAST    gabapentin (NEURONTIN) capsule 400 mg  400 mg Oral BID    oxyCODONE-acetaminophen (PERCOCET 7.5) 7.5-325 mg per tablet 1 Tab  1 Tab Oral Q4H PRN    pravastatin (PRAVACHOL) tablet 40 mg  40 mg Oral QHS    senna-docusate (PERICOLACE) 8.6-50 mg per tablet 2 Tab  2 Tab Oral PCD    acetaminophen (TYLENOL) tablet 500 mg  500 mg Oral Q6H PRN    allopurinol (ZYLOPRIM) tablet 100 mg  100 mg Oral DAILY    clotrimazole (MYCELEX) frances 10 mg  10 mg Oral 5XD    folic acid (FOLVITE) tablet 1 mg  1 mg Oral DAILY    ondansetron (ZOFRAN) injection 4 mg  4 mg IntraVENous Q8H PRN    glucose chewable tablet 16 g  16 g Oral PRN    glucagon (GLUCAGEN) injection 1 mg  1 mg IntraMUSCular PRN    dextrose (D50W) injection syrg 12.5-25 g  25-50 mL IntraVENous PRN       Allergies: Vancomycin; Ampicillin; Bactrim [sulfamethoxazole-trimethoprim]; Blueberry; Ciprofloxacin; Codeine; Crestor [rosuvastatin]; Darvocet a500 [propoxyphene n-acetaminophen]; Demerol [meperidine]; Levaquin [levofloxacin]; Lipitor [atorvastatin]; Magnesium oxide; Minocin [minocycline]; Pcn [penicillins]; Pravachol [pravastatin]; Sulfa (sulfonamide antibiotics); Ultracet [tramadol-acetaminophen]; Vicodin [hydrocodone-acetaminophen];  Vytorin 10-10 [ezetimibe-simvastatin]; and Percodan [oxycodone hcl-oxycodone-asa]    Temp (24hrs), Av.8 °F (37.1 °C), Min:98.2 °F (36.8 °C), Max:100.1 °F (37.8 °C)    Visit Vitals    /65 (BP 1 Location: Left arm, BP Patient Position: At rest)    Pulse 71    Temp 98.8 °F (37.1 °C)    Resp 20    Ht 5' 7\" (1.702 m)    Wt 112 kg (246 lb 14.4 oz)    SpO2 96%    Breastfeeding No    BMI 38.67 kg/m2       ROS: patient unable to communicate fluently. Detailed ros not feasible. Physical Exam:    General: Well developed, well nourished female laying on the bed AAOx3 NAD    General:   awake alert and oriented   HEENT:  Normocephalic, atraumatic, PERRL, EOMI, no scleral icterus or pallor; no conjunctival hemmohage;  nasal and oral mucous are moist and without evidence of lesions. Neck supple, no bruits. Lymph Nodes:   no cervical, axillary or inguinal adenopathy   Lungs:   non-labored, bilaterally clear to auscultation- no crackles wheezes rales or rhonchi   Heart:   s1 and s2 irregular; no rubs or gallops, no edema, + pedal pulses   Abdomen:  soft, non-distended, active bowel sounds, no hepatomegaly, no splenomegaly. Decreased tenderness over the left abdominal pacemaker, no overlying erythema or fluctuance   Genitourinary:  deferred   Extremities:   no clubbing, cyanosis; no joint effusions or swelling; muscle mass appropriate for age   Neurologic:  No gross focal sensory abnormalities; 5/5 muscle strength to upper extremities. 0/5 strength in lower extremities.   Cranial nerves intact                        Skin:  Surgical scars abdomen, left neck well healed   Back:  minimal lumbar spine tenderness around L5, no paraspinal muscle guarding or rigidity, no CVA tenderness     Psychiatric:  No suicidal or homicidal ideations, appropriate mood and affect         Labs: Results:   Chemistry Recent Labs      05/01/17   0450  04/30/17   0400   GLU  63*  61*   NA  135*  135*   K  3.9  3.6   CL  100  99*   CO2  27  28   BUN  38*  37*   CREA  3.37*  3.06*   CA  8.1*  8.1*   AGAP  8  8   BUCR  11*  12      CBC w/Diff Recent Labs      05/01/17   0450  04/30/17   0400   WBC  8.8  8.0   RBC  3.16*  3.06*   HGB  8.2*  8.0*   HCT  24.6*  24.1*   PLT  188  190   GRANS  83*  74   LYMPH  7*  17*   EOS  2  2      Microbiology No results for input(s): CULT in the last 72 hours.        RADIOLOGY:    All available imaging studies/reports in [facility] for this admission were reviewed    Dr. Radhika Barboza, Infectious Disease Specialist  [phone]  May 2, 2017  3:49 PM

## 2017-05-02 NOTE — PROGRESS NOTES
[facility] - Progress Note  Admit Date: 4/20/2017    Assessment:     Hospital Problems  Date Reviewed: 4/20/2017          Codes Class Noted POA    * (Principal)Acute paraplegia ([facility] ICD-10-CM: G82.20  ICD-9-CM: 344.1  4/20/2017 Yes        Sepsis ([facility] [address] ICD-10-CM: A41.9  ICD-9-CM: 038.9, 995.91  4/20/2017 Yes        Psoas abscess, right ([facility] [address] ICD-10-CM: P89.64  ICD-9-CM: 567.31  4/20/2017 Yes        Krueger catheter in place on admission ICD-10-CM: Z96.0  ICD-9-CM: V45.89  4/20/2017 Yes        Urinary tract infection due to Enterococcus ICD-10-CM: N39.0, B95.2  ICD-9-CM: 599.0, 041.04  4/20/2017 Yes        Group B streptococcal infection ICD-10-CM: A49.1  ICD-9-CM: 041.02  4/20/2017 Yes        History of Coumadin therapy ICD-10-CM: Z79.01  ICD-9-CM: V58.61  Unknown Yes    Overview Signed 4/6/2017 10:35 PM by Sharmaine Arguello MD     Anticoagulation for chronic atrial fibrillation; Discontinued on 3/30/2017             Decreased calculated glomerular filtration rate (GFR) ICD-10-CM: R94.4  ICD-9-CM: 794.4  3/30/2017 Yes    Overview Signed 4/6/2017  5:47 PM by Sharmaine Arguello MD     Calculated GFR equivalent to that of CKD stage 3 = 30-59 ml/min             Iliopsoas muscle hematoma ICD-10-CM: S70.10XA  ICD-9-CM: 924.00  3/30/2017 Yes        Psoas hematoma, right, secondary to anticoagulant therapy ICD-10-CM: S30. 1XXA  ICD-9-CM: 924.9, E934.2  3/30/2017 Yes        Impaired mobility and ADLs ICD-10-CM: Z74.09  ICD-9-CM: 799.89  3/30/2017 Yes        Benign hypertensive heart disease with systolic CHF, NYHA class 2 (HCC) (Chronic) ICD-10-CM: I11.0, I50.20  ICD-9-CM: 402.11, 428.20, 428.0  9/5/2012 Yes        [facility]ICD generator infection ([facility] ICD-10-CM: T82. 7XXA  ICD-9-CM: 996.61  8/20/2012 Yes    Overview Signed 8/20/2012  6:13 PM by Kimi Cook MD     S/p explant 4 leads 8/20/12             Type 2 diabetes mellitus with diabetic neuropathy (HCC) (Chronic) ICD-10-CM: E11.40  ICD-9-CM: 250.60, 357.2  6/28/2011 Yes                 -Pacemaker pocket pain, cannot exclude seeding from abscess. Limited options given device dependence and obstruction. Treating conservatively. -Sepsis secondary to infected right psoas hematoma/epidural abscess with neurological compromise resulting in paraplegia. Now s/p drain by IR.  -Heart block s/p AICD 2005 with upgrade to BiV later that year, removed however in 2012 due to trauma and infection. New biventricular pacemaker LUQ 9/2012 by Dr. 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 Normal function 3/2017. Patient does report discomfort left upper quadrant following fall last month. Right axillary vein obstruction by venogram 2012.   -Elevated troponin likely demand ischemia in setting of sepsis. -H/p transient nonischemic CMY with EF 45% (45-50% 6/2015 with patent coronaries on cath 1996). No ischemia on nuclear 6/2015.  -Acute kidney injury, suspect multifactorial.  -Diabetes mellitus.  -Dyslipidemia. -H/o HTN. -LBBB. -HALI on CPAP. -H/o DVT. Plan:     Still with abdominal pain with therapy but overall feeling better. Slowly regaining some lower extremity function. Creatinine remains elevated, appreciate nephrology involvement. Will continue abx therapy and continue to follow clinical response. Subjective:     Tired, weak. Still with abdominal pain but improved overall.     Objective:      Patient Vitals for the past 8 hrs:   Temp Pulse Resp BP SpO2   05/02/17 0800 98.5 °F (36.9 °C) 87 20 100/62 96 %   05/02/17 0359 98.3 °F (36.8 °C) 80 18 130/63 100 %         Patient Vitals for the past 96 hrs:   Weight   05/02/17 0431 112 kg (246 lb 14.4 oz)   05/01/17 0736 111.3 kg (245 lb 6 oz)   04/30/17 0528 111.3 kg (245 lb 4.8 oz)                    Intake/Output Summary (Last 24 hours) at 05/02/17 1040  Last data filed at 05/01/17 2127   Gross per 24 hour   Intake                0 ml   Output              550 ml   Net             -550 ml Physical Exam:  General:  fatigued, no distress, appears stated age  Neck:  no JVD  Lungs:  clear to auscultation bilaterally  Heart:  regular rate and rhythm  Abdomen:  abdomen is soft without significant tenderness, masses, organomegaly or guarding  Extremities:  extremities normal, atraumatic, no cyanosis or edema    Data Review:     Labs: Results:       Chemistry Recent Labs      05/01/17   0450 04/30/17   0400   GLU  63*  61*   NA  135*  135*   K  3.9  3.6   CL  100  99*   CO2  27  28   BUN  38*  37*   CREA  3.37*  3.06*   CA  8.1*  8.1*   AGAP  8  8   BUCR  11*  12      CBC w/Diff Recent Labs      05/01/17   0450 04/30/17   0400   WBC  8.8  8.0   RBC  3.16*  3.06*   HGB  8.2*  8.0*   HCT  24.6*  24.1*   PLT  188  190   GRANS  83*  74   LYMPH  7*  17*   EOS  2  2      Cardiac Enzymes No results found for: CPK, CKMMB, CKMB, RCK3, CKMBT, CKNDX, CKND1, CARLI, TROPT, TROIQ, AMBERLY, TROPT, TNIPOC, BNP, BNPP   Coagulation No results for input(s): PTP, INR, APTT in the last 72 hours. No lab exists for component: INREXT    Lipid Panel Lab Results   Component Value Date/Time    Cholesterol, total 154 07/24/2012 01:00 AM    HDL Cholesterol 48 07/24/2012 01:00 AM    LDL, calculated 90.4 07/24/2012 01:00 AM    VLDL, calculated 15.6 07/24/2012 01:00 AM    Triglyceride 78 07/24/2012 01:00 AM    CHOL/HDL Ratio 3.2 07/24/2012 01:00 AM      BNP Lab Results   Component Value Date/Time    B-type Natriuretic Peptide 111.9 11/19/2013 10:17 AM      Liver Enzymes No results for input(s): TP, ALB, TBIL, AP, SGOT, GPT in the last 72 hours.     No lab exists for component: DBIL   Digoxin    Thyroid Studies Lab Results   Component Value Date/Time    TSH 0.51 04/20/2017 07:26 PM          Signed By: LUDMILA Morrissey     May 2, 2017

## 2017-05-02 NOTE — PROGRESS NOTES
RENAL DAILY PROGRESS NOTE    Impression & Plan:   IMPRESSION:   · Acute kidney injury, recent episode last week, multiple risk factor ,urinary retention, ( high suspicion ) , poor po intake over last few weeks, ongoing sepsis, ? Septic emboli, Medication toxicity such as was on gentamycin, ? Allergic reaction from ceftriaxone--- Non oliguric, renal function this morning pending  · Urinary retention, ? nurogenic bladder  · Right leg weakness  · Heart block, S/p AICD placement   PLAN:   · Continue  normal saline at rate 75cc/hr, monitor renal function and urine output. · She will need perez for now as suspect neurogenic bladder. Discussed with Dr. Swati Holley, Dr. Kermit Schroeder. Subjective:       Complaint:     Overnight events noted  Feels okay   no nausea, vomiting, chest pain, short of breath, cough, seizure.      Current Facility-Administered Medications   Medication Dose Route Frequency    famotidine (PEPCID) tablet 20 mg  20 mg Oral DAILY    0.9% sodium chloride infusion  75 mL/hr IntraVENous CONTINUOUS    insulin lispro (HUMALOG) injection   SubCUTAneous AC&HS    0.9% sodium chloride infusion 250 mL  250 mL IntraVENous PRN    cefTRIAXone (ROCEPHIN) 2 g in 0.9% sodium chloride (MBP/ADV) 50 mL MBP  2 g IntraVENous Q24H    insulin glargine (LANTUS) injection 20 Units  20 Units SubCUTAneous DAILY    diphenhydrAMINE (BENADRYL) capsule 50 mg  50 mg Oral Q4H PRN    polyethylene glycol (MIRALAX) packet 17 g  17 g Oral TID    cholecalciferol (VITAMIN D3) tablet 2,000 Units  2,000 Units Oral DAILY    docusate sodium (COLACE) capsule 100 mg  100 mg Oral DAILY AFTER BREAKFAST    gabapentin (NEURONTIN) capsule 400 mg  400 mg Oral BID    oxyCODONE-acetaminophen (PERCOCET 7.5) 7.5-325 mg per tablet 1 Tab  1 Tab Oral Q4H PRN    pravastatin (PRAVACHOL) tablet 40 mg  40 mg Oral QHS    senna-docusate (PERICOLACE) 8.6-50 mg per tablet 2 Tab  2 Tab Oral PCD    acetaminophen (TYLENOL) tablet 500 mg  500 mg Oral Q6H PRN    allopurinol (ZYLOPRIM) tablet 100 mg  100 mg Oral DAILY    clotrimazole (MYCELEX) frances 10 mg  10 mg Oral 5XD    folic acid (FOLVITE) tablet 1 mg  1 mg Oral DAILY    ondansetron (ZOFRAN) injection 4 mg  4 mg IntraVENous Q8H PRN    glucose chewable tablet 16 g  16 g Oral PRN    glucagon (GLUCAGEN) injection 1 mg  1 mg IntraMUSCular PRN    dextrose (D50W) injection syrg 12.5-25 g  25-50 mL IntraVENous PRN       Review of Symptoms: comprehensive ROS negative except above.    Objective:   Patient Vitals for the past 24 hrs:   Temp Pulse Resp BP SpO2   05/02/17 1200 98.8 °F (37.1 °C) 71 20 113/65 96 %   05/02/17 0800 98.5 °F (36.9 °C) 87 20 100/62 96 %   05/02/17 0359 98.3 °F (36.8 °C) 80 18 130/63 100 %   05/02/17 0011 98.2 °F (36.8 °C) 79 18 98/51 100 %   05/01/17 1930 100.1 °F (37.8 °C) 85 18 98/59 100 %   05/01/17 1636 98.9 °F (37.2 °C) 82 16 130/64 97 %        Weight change:      04/30 1901 - 05/02 0700  In: -   Out: 2893 [Urine:1075]    Intake/Output Summary (Last 24 hours) at 05/02/17 1515  Last data filed at 05/02/17 1507   Gross per 24 hour   Intake                0 ml   Output             1500 ml   Net            -1500 ml     Physical Exam:   General: comfortable, no acute distress   HEENT sclera anicteric, supple neck, no thyromegaly  CVS: S1S2 heard, no rub  RS: + air entry b/l,   Abd: Soft, Non tender,  Neuro: awake  Extrm: + edema, no cyanosis, clubbing   Skin: no visible Rash  Musculoskeletal: No gross joints or bone deformities            Data Review:     LABS:   Hematology: Recent Labs      05/01/17 0450 04/30/17   0400   WBC  8.8  8.0   HGB  8.2*  8.0*   HCT  24.6*  24.1*     Chemistry: Recent Labs      05/01/17   0450  04/30/17   0400   BUN  38*  37*   CREA  3.37*  3.06*   CA  8.1*  8.1*   K  3.9  3.6   NA  135*  135*   CL  100  99*   CO2  27  28   GLU  63*  61*              Procedures/imaging: see electronic medical records for all procedures, Xrays and details which were not copied into this note but were reviewed prior to creation of Plan          Assessment & Plan:     As above         Marylynn Soulier, MD  5/2/2017  3:15 PM

## 2017-05-02 NOTE — ROUTINE PROCESS
Bedside and Verbal shift change report given to DANIEL Leon (oncoming nurse) by Aziza Antoine (offgoing nurse). Report included the following information SBAR.

## 2017-05-02 NOTE — PROGRESS NOTES
Problem: Mobility Impaired (Adult and Pediatric)  Goal: *Acute Goals and Plan of Care (Insert Text)  STGs to be addressed within 3 days:  1. Bed mobility: Supine to sit to supine MaxAx1 with HR for meals. 2. Activity tolerance: Tolerate EOB > 15 minutes for ADLs. 3. Transfers: SPT-->to chair max/mod A with LRAD for ADLs. 4. Pt demo fair seated balance in preparation for functional transfers. LTGs to be addressed within 7 days:  1. Transfers: SB-->to chair/wc max/mod A for ADLs. 2. Activity tolerance: Tolerated > 1 hr in chair for change of position. 3. Patient Education: Independent with HEP for home safety. PHYSICAL THERAPY TREATMENT     Patient: Solo Arora (11 y.o. female)  Date: 5/2/2017  Diagnosis: Acute paraplegia  Acute paraplegia ([address] Acute paraplegia [facility])       Precautions: Fall, Skin  Chart, physical therapy assessment, plan of care and goals were reviewed. ASSESSMENT:  Increased bilateral LE strength/motor control noted as pt is noted to actively participate with single limb press despite cont'd profound weakness. Pt's understanding of technique for bed mobility improving with decreased cues for rolling req'd. Pt will benefit from increased sitting and will plan to attempt transfer training at next scheduled interval (slide board may benefit). Progression toward goals:  [ ]      Improving appropriately and progressing toward goals  [X]      Improving slowly and progressing toward goals  [ ]      Not making progress toward goals and plan of care will be adjusted       PLAN:  Patient continues to benefit from skilled intervention to address the above impairments. Continue treatment per established plan of care. Discharge Recommendations:  Rehab,[facility] unit  Further Equipment Recommendations for Discharge:  N/A       SUBJECTIVE:   Patient stated I can feel it.       OBJECTIVE DATA SUMMARY:   Critical Behavior:  Neurologic State: Alert  Orientation Level: Oriented X4  Cognition: Appropriate decision making, Follows commands  Safety/Judgement: Awareness of environment, Fall prevention  Functional Mobility Training:  Bed Mobility:  Rolling: Maximum assistance (increased understanding of technique& ability to AA heel sli)     Therapeutic Exercises:   Bilateral LE ROM in all planes,piriformis stretch,gastroc stretch,hamstring stretch 3 x's 20\" each  Single limb press combined with SKC with noted B LE active press R>L. Strength is significantly diminished still,however, definite motor activity noted on command. Decreased activity noted with hamstring/hip flexor with heel slide. Pain:  Pt reports 4/10 pain or discomfort prior to treatment. Pt reports 4/10 pain or discomfort post treatment. With elevations of sensation and discomfort during ROM activities. Activity Tolerance:   Fair  Please refer to the flowsheet for vital signs taken during this treatment.   After treatment:   [ ] Patient left in no apparent distress sitting up in chair  [X] Patient left in no apparent distress in bed  [X] Call bell left within reach  [ ] Nursing notified  [ ] Caregiver present  [ ] Bed alarm activated      Emilio Blood, PTA   Time Calculation: 23 mins

## 2017-05-02 NOTE — PROGRESS NOTES
[facility] Hospitalist Group  Progress Note    Patient: Anitha Garay Age: 77 y.o. : 1950 MR#: [id number] SSN: [id number]  Date/Time: 2017     Subjective:     No chills   No SOB, No NVD  Feels same   No to little sensations in legs     Assessment/Plan:    77 yr old Female with PMH of iliopsoas hematoma , patient developed sudden onset of Right  leg weakness , patient evaluated by rehab MD and was transferred to medical service for further management . Dr Shaggy Abbasi - spine surgery consulted , neurology consulted , patient is admitted to ICU        1 Right Leg weakness   - Infected Psoas  hematoma   - continue IV antibiotics   - Follow with ID     2 CMO - LBBB, - followed by cardiology   - H/O PACE Maker placement for CHB   -       3 DM2  - Her po intake is unpredictable - one reading on 5117 AM - hypoglycemia   - continue accu checks q 6 hrs with coverage      4 H/O DVT off  AC - due to Bleed     5 Elevated trop - resolved     6 Acute blood loss anemia   - stable       7 UTI- POA   - Continue current antibiotics   -  urine culture - E Fecalis     8 LISBETH   - D/W nephrology   - Monitor renal function   - Urology consult - ?  Neurogenic bladder   - Continue Krueger cath       D/w patient and her family in room with her   On discharge they would like to go home       Case discussed with:  [x]Patient  [x]Family  [x]Nursing  []Case Management  DVT Prophylaxis:  []Lovenox  []Hep SQ  [x]SCDs  []Coumadin   []On Heparin gtt    Patient Active Problem List   Diagnosis Code    Nonischemic cardiomyopathy ([facility] I42.8    History of complete heart block Z86.79    Biventricular implantable cardioverter-defibrillator in situ Z95.810    Left bundle branch block (LBBB) on electrocardiogram I44.7    Type 2 diabetes mellitus with diabetic neuropathy ([facility] [address] E11.40    Dyslipidemia E78.5    Diabetic neuropathy associated with type 2 diabetes mellitus ([facility] [address] E11.40    Obstructive sleep apnea on CPAP G47.33, Z99.89    AICD generator infection ([facility] T82. 7XXA    Difficult airway for intubation T88. 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 1XXA    Impaired mobility and ADLs Z74.09    History of Coumadin therapy Z79.01    Gout M10.9    Acute paraplegia ([facility]) G82.20    Sepsis ([facility] [address] [facility] A41.9    Psoas abscess, right ([facility] [address] [facility] K68.12    Krueger catheter in place on admission Z96.0    Urinary tract infection due to Enterococcus N39.0, B95.2    Group B streptococcal infection A49.1       Objective:   VS:   Visit Vitals    /65 (BP 1 Location: Left arm, BP Patient Position: At rest)    Pulse 71    Temp 98.8 °F (37.1 °C)    Resp 20    Ht 5' 7\" (1.702 m)    Wt 112 kg (246 lb 14.4 oz)    SpO2 96%    Breastfeeding No    BMI 38.67 kg/m2      Tmax/24hrs: Temp (24hrs), Av.8 °F (37.1 °C), Min:98.2 °F (36.8 °C), Max:100.1 °F (37.8 °C)  IOBRIEF    Intake/Output Summary (Last 24 hours) at 17 1343  Last data filed at 17 2129   Gross per 24 hour   Intake                0 ml   Output              550 ml   Net             -550 ml       General:  Ill appearing   HEENT:  NC, Atraumatic. PERRLA, anicteric sclerae. Pulmonary:  CTA Bilaterally. No Wheezing/Rhonchi/Rales. Cardiovascular: Regular rate and Rhythm. GI:  Soft, Non distended, Non tender. + Bowel sounds. Extremities:  No edema, cyanosis, clubbing. No calf tenderness. Psych:  Not anxious or agitated.   Neurologic: Grossly - Motor and Sensory functions are intact .  No Anxiety , calm , no Agitation         Medications:   Current Facility-Administered Medications   Medication Dose Route Frequency    famotidine (PEPCID) tablet 20 mg  20 mg Oral DAILY    0.9% sodium chloride infusion  75 mL/hr IntraVENous CONTINUOUS    insulin lispro (HUMALOG) injection   SubCUTAneous AC&HS    0.9% sodium chloride infusion 250 mL  250 mL IntraVENous PRN    cefTRIAXone (ROCEPHIN) 2 g in 0.9% sodium chloride (MBP/ADV) 50 mL MBP  2 g IntraVENous Q24H    insulin glargine (LANTUS) injection 20 Units  20 Units SubCUTAneous DAILY    diphenhydrAMINE (BENADRYL) capsule 50 mg  50 mg Oral Q4H PRN    polyethylene glycol (MIRALAX) packet 17 g  17 g Oral TID    cholecalciferol (VITAMIN D3) tablet 2,000 Units  2,000 Units Oral DAILY    docusate sodium (COLACE) capsule 100 mg  100 mg Oral DAILY AFTER BREAKFAST    gabapentin (NEURONTIN) capsule 400 mg  400 mg Oral BID    oxyCODONE-acetaminophen (PERCOCET 7.5) 7.5-325 mg per tablet 1 Tab  1 Tab Oral Q4H PRN    pravastatin (PRAVACHOL) tablet 40 mg  40 mg Oral QHS    senna-docusate (PERICOLACE) 8.6-50 mg per tablet 2 Tab  2 Tab Oral PCD    acetaminophen (TYLENOL) tablet 500 mg  500 mg Oral Q6H PRN    allopurinol (ZYLOPRIM) tablet 100 mg  100 mg Oral DAILY    clotrimazole (MYCELEX) frances 10 mg  10 mg Oral 5XD    folic acid (FOLVITE) tablet 1 mg  1 mg Oral DAILY    ondansetron (ZOFRAN) injection 4 mg  4 mg IntraVENous Q8H PRN    glucose chewable tablet 16 g  16 g Oral PRN    glucagon (GLUCAGEN) injection 1 mg  1 mg IntraMUSCular PRN    dextrose (D50W) injection syrg 12.5-25 g  25-50 mL IntraVENous PRN       Labs:    Recent Results (from the past 24 hour(s))   GLUCOSE, POC    Collection Time: 05/01/17  6:48 PM   Result Value Ref Range    Glucose (POC) 145 (H) 70 - 110 mg/dL   GLUCOSE, POC    Collection Time: 05/01/17  9:10 PM   Result Value Ref Range    Glucose (POC) 175 (H) 70 - 110 mg/dL   URINALYSIS W/MICROSCOPIC Collection Time: 05/02/17  4:47 AM   Result Value Ref Range    Color YELLOW      Appearance CLOUDY      Specific gravity 1.017 1.005 - 1.030      pH (UA) 5.0 5.0 - 8.0      Protein 30 (A) NEG mg/dL    Glucose NEGATIVE  NEG mg/dL    Ketone NEGATIVE  NEG mg/dL    Bilirubin NEGATIVE  NEG      Blood MODERATE (A) NEG      Urobilinogen 0.2 0.2 - 1.0 EU/dL    Nitrites NEGATIVE  NEG      Leukocyte Esterase MODERATE (A) NEG      WBC 4 to 10 0 - 4 /hpf    RBC 0 to 3 0 - 5 /hpf    Epithelial cells FEW 0 - 5 /lpf    Bacteria 1+ (A) NEG /hpf    Yeast 4+ (A) NEG   EOSINOPHILS, URINE    Collection Time: 05/02/17  4:47 AM   Result Value Ref Range    Eosinophils,urine FEW     SODIUM, UR, RANDOM    Collection Time: 05/02/17  4:47 AM   Result Value Ref Range    Sodium urine, random 40 20 - 110 MMOL/L   GLUCOSE, POC    Collection Time: 05/02/17  6:49 AM   Result Value Ref Range    Glucose (POC) 94 70 - 110 mg/dL   GLUCOSE, POC    Collection Time: 05/02/17 10:39 AM   Result Value Ref Range    Glucose (POC) 165 (H) 70 - 110 mg/dL   CK    Collection Time: 05/02/17 12:40 PM   Result Value Ref Range    CK 91 26 - 192 U/L       Signed By: Wilberto Gomez MD     May 2, 2017

## 2017-05-02 NOTE — CONSULTS
UROLOGY Consult    Patient: Carol Hu MRN: [id number]  SSN: [id number]    YOB: 1950  Age: 77 y.o. Sex: female      Admit Date: 4/20/2017    LOS: 12 days   Consult: Dr Wesley Kaminski. REASON FOR CONSULT: URINARY RETENTION, LISBETH ON CKD, R/O NEUROGENIC BLADDER    Subjective:     Called by Dr Wesley Kaminski to eval patient for duration of perez. 73yo F admitted for LE numbness, sepsis, LE weakness s/p RP - Right PSOAS bleed (hematoma). Recently adm for RLE numbness and was initially found to have 4.5cm Rt spontaneous psoas hematoma that got larger to 6cm. INR 4.3 at that time. Coumadin held. She had IR Drain placed in the collection which is now growing Group B Strep. Ucx: Enterococcus on Ceftriaxone  UA - yeast.     Found to have Cr up to 3.37 (base line 1.4-1.5)  Now patient has been unable to ambulate since admission. - Undergoing PT. Perez placed by team and wants to know the plan going forward. Pt denies dysuria, hematuria, kidney stone stones, trauma. Past Medical History:   Diagnosis Date    Acute paraplegia ([facility] [address] 4/20/2017    Benign hypertensive heart disease with systolic CHF, NYHA class 2 ([address] 9/5/2012    Biventricular implantable cardioverter-defibrillator in situ 04/28/2005    Upgraded to BiV AICD; gen change 4/2008; pocket revision 10/2009; Abdominal - done on 8/22/2012 by Dr. Edi Yen Cardiac cath 08/15/1996    Patent coronaries. Elev LVEDP. EF 50-55%.  Cardiac echocardiogram 06/23/2015    Ltd study. EF 45-50%. Mild, diffuse hypk. Severe apical hypk. No mass or thrombus was clearly identified, although imaging was suboptimal.      Cardiac nuclear imaging test 06/19/2015    Fixed distal apical, distal septal defect more likely due to RV pacing than prior infarct. No ischemia. EF 46%. RWMA c/w RV pacing. Nondiagnostic EKG on pharm stress test.      Cardiovascular lower extremity venous duplex 09/04/2012    Acute, non-occlusive DVT in CFV on right.   No DVT on left. No superficial thrombosis bilaterally.  Cardiovascular upper extremity venous duplex 08/27/2012    DVT in axillary vein on left. Left subclavian was not visualized.     Chronic anemia 9/5/2012    Chronic systolic heart failure (HCC)     Decreased calculated glomerular filtration rate (GFR) 3/30/2017    Calculated GFR equivalent to that of CKD stage 3 = 30-59 ml/min    Diabetic neuropathy associated with type 2 diabetes mellitus ([address] 6/28/2011    Difficult airway for intubation 08/22/2012    see anesthesia airway note    Dyslipidemia 6/28/2011    Gout     History of complete heart block 6/28/2011    History of Coumadin therapy     Anticoagulation for DVT of the LUE; Discontinued on 3/30/2017    History of deep venous thrombosis 9/5/2012    Left upper extremity    History of pyelonephritis 3/30/2017    Left bundle branch block (LBBB) on electrocardiogram 6/28/2011    Nonischemic cardiomyopathy ([address] 6/28/2011    Obesity (BMI 35.0-39.9 without comorbidity) ([address] 3/13/2017    Obstructive sleep apnea on CPAP 2/7/2012    Psoas abscess, right ([address] 4/20/2017    Psoas hematoma, right, secondary to anticoagulant therapy 3/30/2017    Type 2 diabetes mellitus with diabetic neuropathy ([address] 6/28/2011       Past Surgical History:   Procedure Laterality Date    HX CARPAL TUNNEL RELEASE  4/07    right     HX CHOLECYSTECTOMY  1994    HX HYSTERECTOMY  1973    HX OTHER SURGICAL  6/11/2012    AICD revision    HX PACEMAKER  4/28/2005    [employer] AICD       Review of Systems - History obtained from chart review and the patient  General ROS: positive for  - improving sepsis, LE weakness  Psychological ROS: negative  ENT ROS: negative  Hematological and Lymphatic ROS: negative  Endocrine ROS: DM  Respiratory ROS: no cough, shortness of breath, or wheezing  Cardiovascular ROS: negative   Gastrointestinal ROS: no abdominal pain, change in bowel habits, or black or bloody stools  Genito-Urinary ROS: No sensation - Urge  Musculoskeletal ROS: negative  positive for - LE weakness and numbness  Neurological ROS: as above        Current Facility-Administered Medications:     famotidine (PEPCID) tablet 20 mg, 20 mg, Oral, DAILY, Jorge Kelly MD, 20 mg at 05/02/17 0805    0.9% sodium chloride infusion, 75 mL/hr, IntraVENous, CONTINUOUS, Jamaal Jason MD, Last Rate: 75 mL/hr at 05/02/17 0759, 75 mL/hr at 05/02/17 0759    insulin lispro (HUMALOG) injection, , SubCUTAneous, AC&HS, Sabra Shukla MD, 3 Units at 05/02/17 1055    0.9% sodium chloride infusion 250 mL, 250 mL, IntraVENous, PRN, Jorge Kelly MD    cefTRIAXone (ROCEPHIN) 2 g in 0.9% sodium chloride (MBP/ADV) 50 mL MBP, 2 g, IntraVENous, Q24H, Abena Dan MD, Last Rate: 100 mL/hr at 05/02/17 1057, 2 g at 05/02/17 1057    insulin glargine (LANTUS) injection 20 Units, 20 Units, SubCUTAneous, DAILY, Jorge Kelly MD, 20 Units at 05/02/17 0805    diphenhydrAMINE (BENADRYL) capsule 50 mg, 50 mg, Oral, Q4H PRN, Darwin Stroud MD, 50 mg at 04/22/17 0406    polyethylene glycol (MIRALAX) packet 17 g, 17 g, Oral, TID, Jeanette Angel MD, 17 g at 05/02/17 0800    cholecalciferol (VITAMIN D3) tablet 2,000 Units, 2,000 Units, Oral, DAILY, Sabra Shukla MD, 2,000 Units at 05/02/17 0800    docusate sodium (COLACE) capsule 100 mg, 100 mg, Oral, DAILY AFTER BREAKFAST, Sabra Shukla MD, 100 mg at 05/02/17 0800    gabapentin (NEURONTIN) capsule 400 mg, 400 mg, Oral, BID, Sabra Shukla MD, 400 mg at 05/02/17 0800    oxyCODONE-acetaminophen (PERCOCET 7.5) 7.5-325 mg per tablet 1 Tab, 1 Tab, Oral, Q4H PRN, Sabra Shukla MD, 1 Tab at 05/01/17 0919    pravastatin (PRAVACHOL) tablet 40 mg, 40 mg, Oral, QHS, Sabra Shukla MD, 40 mg at 05/01/17 2221    senna-docusate (PERICOLACE) 8.6-50 mg per tablet 2 Tab, 2 Tab, Oral, PCD, Sabra Shukla MD, 2 Tab at 05/01/17 1716    acetaminophen (TYLENOL) tablet 500 mg, 500 mg, Oral, Q6H PRN, Evelyn Cardinal MD Geno, 500 mg at 04/29/17 1733    allopurinol (ZYLOPRIM) tablet 100 mg, 100 mg, Oral, DAILY, Karma Rivera MD, 100 mg at 05/02/17 0800    clotrimazole [facility]) frances 10 mg, 10 mg, Oral, 5XD, Karma Rivera MD, 10 mg at 26/77/80 0566    folic acid (FOLVITE) tablet 1 mg, 1 mg, Oral, DAILY, Karma Rivera MD, 1 mg at 05/02/17 0800    ondansetron (ZOFRAN) injection 4 mg, 4 mg, IntraVENous, Q8H PRN, Karma Rivera MD    glucose chewable tablet 16 g, 16 g, Oral, PRN, Karma Rivera MD    glucagon (GLUCAGEN) injection 1 mg, 1 mg, IntraMUSCular, PRN, Karma Rivera MD    dextrose (D50W) injection syrg 12.5-25 g, 25-50 mL, IntraVENous, PRN, Karma Rivera MD    Objective:     Vitals:    05/02/17 0359 05/02/17 0431 05/02/17 0800 05/02/17 1200   BP: 130/63  100/62 113/65   Pulse: 80  87 71   Resp: 18  20 20   Temp: 98.3 °F (36.8 °C)  98.5 °F (36.9 °C) 98.8 °F (37.1 °C)   TempSrc: Oral      SpO2: 100%  96% 96%   Weight:  246 lb 14.4 oz (112 kg)     Height:            Intake and Output:  Current Shift: 05/02 0701 - 05/02 1900  In: -   Out: 950 [Urine:800; Drains:150]  Last three shifts: 04/30 1901 - 05/02 0700  In: -   Out: 1075 [Urine:1075]    Physical Exam:   GENERAL: alert, cooperative, no distress, appears stated age  THROAT & NECK: normal and no erythema or exudates noted. LUNG: no audible wheeze  HEART: reg  ABDOMEN: soft, non-tender.  Bowel sounds normal. No masses,  no organomegaly  EXTREMITIES:  Edema, very weak sensation below hips, LE weakness Rt > Left  NEUROLOGIC: positive findings: as above  : [place] in place - clear    Results Review:    Chemistry Recent Labs      05/01/17   0450  04/30/17   0400   GLU  63*  61*   NA  135*  135*   K  3.9  3.6   CL  100  99*   CO2  27  28   BUN  38*  37*   CREA  3.37*  3.06*   CA  8.1*  8.1*   AGAP  8  8   BUCR  11*  12        Lactic Acid Lactic acid   Date Value Ref Range Status   04/20/2017 1.7 0.4 - 2.0 MMOL/L Final     No results for input(s): LAC in the last 72 hours. Liver Enzymes Protein, total   Date Value Ref Range Status   04/21/2017 6.4 6.4 - 8.2 g/dL Final     Albumin   Date Value Ref Range Status   04/21/2017 2.5 (L) 3.4 - 5.0 g/dL Final     Globulin   Date Value Ref Range Status   04/21/2017 3.9 2.0 - 4.0 g/dL Final     A-G Ratio   Date Value Ref Range Status   04/21/2017 0.6 (L) 0.8 - 1.7   Final     AST (SGOT)   Date Value Ref Range Status   04/21/2017 54 (H) 15 - 37 U/L Final     Alk. phosphatase   Date Value Ref Range Status   04/21/2017 144 (H) 45 - 117 U/L Final     No results for input(s): TP, ALB, GLOB, AGRAT, SGOT, GPT, AP, TBIL in the last 72 hours. No lab exists for component: DBIL     CBC w/Diff Recent Labs      05/01/17   0450  04/30/17   0400   WBC  8.8  8.0   RBC  3.16*  3.06*   HGB  8.2*  8.0*   HCT  24.6*  24.1*   PLT  188  190   GRANS  83*  74   LYMPH  7*  17*   EOS  2  2        Cardiac Enzymes Lab Results   Component Value Date/Time    CPK 91 05/02/2017 12:40 PM        BNP Lab Results   Component Value Date/Time    B-type Natriuretic Peptide 111.9 11/19/2013 10:17 AM        Coagulation No results for input(s): PTP, INR, APTT in the last 72 hours. No lab exists for component: INREXT      Thyroid  Lab Results   Component Value Date/Time    TSH 0.51 04/20/2017 07:26 PM          ABG No results for input(s): PHI, PHI, PCO2I, PO2, PO2I, HCO3, HCO3I, FIO2, FIO2I in the last 72 hours.     No lab exists for component: POC2     Urinalysis Lab Results   Component Value Date/Time    Color YELLOW 05/02/2017 04:47 AM    Appearance CLOUDY 05/02/2017 04:47 AM    Specific gravity 1.017 05/02/2017 04:47 AM    pH (UA) 5.0 05/02/2017 04:47 AM    Protein 30 05/02/2017 04:47 AM    Glucose NEGATIVE  05/02/2017 04:47 AM    Ketone NEGATIVE  05/02/2017 04:47 AM    Bilirubin NEGATIVE  05/02/2017 04:47 AM    Urobilinogen 0.2 05/02/2017 04:47 AM    Nitrites NEGATIVE  05/02/2017 04:47 AM    Leukocyte Esterase MODERATE 05/02/2017 04:47 AM    Epithelial cells FEW 05/02/2017 04:47 AM    Bacteria 1+ 05/02/2017 04:47 AM    WBC 4 to 10 05/02/2017 04:47 AM    RBC 0 to 3 05/02/2017 04:47 AM        Micro  No results for input(s): SDES, CULT in the last 72 hours. No results for input(s): CULT in the last 72 hours. US Results: (Most Recent)   Results from [place] encounter on 04/20/17   US ABD LTD   Narrative ULTRASOUND ABDOMEN-LIMITED    INDICATION: Evaluation for fluid collection/abscess at the site of pacemaker  pocket    COMPARISON: Correlation with CT abdomen/pelvis 4/19/2017    TECHNIQUE: Ultrasound evaluation was performed of the left upper quadrant  abdomen in the area of pacemaker. Selected static images are submitted for  review. FINDINGS:  In the area of concern, there is no evidence for focal fluid collection/abscess. Impression IMPRESSION:  As above. CT (Most Recent)   Results from Hospital Encounter encounter on 04/20/17   CT ABD PELV WO CONT   Narrative CT Abdomen and Pelvis without contrast    INDICATION: Psoas abscess follow-up. TECHNIQUE: 5 mm collimation axial images obtained from the diaphragm to the  level of the pubic symphysis without nonionic intravenous contrast.     Dose reduction techniques used: Automated exposure control, adjustment of the  mAs and/or kVp according to patient size, standardized low-dose protocol, and/or  iterative reconstruction technique. COMPARISON: CT 4/19/2017. CT 3/16/2017    ABDOMEN FINDINGS:    Lung Bases: Bibasilar atelectasis is moderate in severity and has increased in  the interim. There are small pericardial effusions. Postoperative changes of the  heart are stable with mild cardiomegaly. Liver: Normal in attenuation. Posterior subcapsular calcifications are stable. Gallbladder: Absent. No ductal dilatation. Pancreas: There is mild fatty atrophy without mass or ductal dilatation. Naoma Broccoli Spleen: Heterogeneous attenuation.  A band of calcification in the posterior  aspect is stable. The spleen measures 15 cm in length. .    Adrenal Glands: No evidence for mass. Kidneys:   Right: Atrophic. High density intracortical lesion is stable. .  No  hydronephrosis. Left:  Atrophic. Lower pole cyst measures 5.7 x 5.0 cm and is stable. No  hydronephrosis. Lymph Nodes: No lymphadenopathy. Aorta:  Normal in diameter. Percutaneous drain has been placed in the right psoas muscle. The right psoas  muscle remains enlarged but smaller compared to previous exam. Without the  benefit of intravenous contrast, discrete fluid collections cannot be  identified. There are no new undrained fluid collections. PELVIS FINDINGS:     Bowel:   Small Bowel: Normal in caliber with normal wall thickness. Large Bowel: Mild to moderate burden of stool in the right colon and  rectosigmoid colon. No mural thickening or pericolonic inflammation. Frutoso Golder Appendix: Normal.    Bladder: Collapsed around a Krueger catheter. Ureters: No ureteral dilatation. The uterus is absent. Multiple small concentrated pelvic calcifications are  redemonstrated in the posterior pelvis to the right of midline. There is a small amount of presacral edema. Prominent bilateral inguinal lymph nodes appear similar. Bones: Degenerative changes of the spine and hips are stable. Median sternotomy  is well opposed. Stimulator battery pack in the left abdominal wall is stable. The amount of  subcutaneous edema in the flanks is increasing. Impression IMPRESSION:    1. Right psoas muscle is smaller after percutaneous drain placement. Residual  fluid collection cannot be assessed without the benefit of intravenous contrast.    2.  Increasing bibasilar atelectasis. Pulmonary toilet should be encouraged. 3. Mild splenomegaly. Heterogeneous attenuation could be the result of infarcts.   4. Pelvic calcifications are stable and could be the result of dropped  gallstones or calcified ovarian mass or peritoneal calcifications. 5. No evidence for bowel obstruction or inflammation. 6. New presacral edema. Increasing subcutaneous edema of the flanks. 7. Stable renal atrophy and cortical lesions as described above. Assessment:     Principal Problem:    Acute paraplegia ([address] (4/20/2017)    Active Problems:    Type 2 diabetes mellitus with diabetic neuropathy ([address] (6/28/2011)      AICD generator infection ([address] (8/20/2012)      Overview: S/p explant 4 leads 8/20/12      Benign hypertensive heart disease with systolic CHF, NYHA class 2 ([address] (9/5/2012)      Decreased calculated glomerular filtration rate (GFR) (3/30/2017)      Overview: Calculated GFR equivalent to that of CKD stage 3 = 30-59 ml/min      Iliopsoas muscle hematoma (3/30/2017)      Psoas hematoma, right, secondary to anticoagulant therapy (3/30/2017)      Impaired mobility and ADLs (3/30/2017)      History of Coumadin therapy ()      Overview: Anticoagulation for chronic atrial fibrillation; Discontinued on 3/30/2017      Sepsis ([address] (4/20/2017)      Psoas abscess, right ([address] (4/20/2017)      Perez catheter in place on admission (4/20/2017)      Urinary tract infection due to Enterococcus (4/20/2017)      Group B streptococcal infection (4/20/2017)        Plan:     1. R/o NEUROGENIC BLADDER - shock phase from recent neurologic insult. 2. Maintain perez for now. 3. Resolved sepsis  4. Cont Abx - UA showed yeast, no documentation of treatment. - Will treat with renal dosing of diflucan    And also consider treating Enterococcus in UrineCx (macrobid). Reviewed cx and allergy profile. 5. IR drain per primary team (psoas abscess)  6. LISBETH - CKD - Gently hydration. IVF. Avoid nephrotoxins  7. F/u Renal, ortho. 8. Voiding trial prior to discharge and when ambulatory, Check PVR. If she fails, replace perez and will obtain Urodynamics as outpt  9. 8. Will follow.      Signed By: Cary Stanley MD    May 2, 2017        PAGER:   [phone]  OFFICE:   [facility]

## 2017-05-03 LAB
ANION GAP BLD CALC-SCNC: 9 MMOL/L (ref 3–18)
BUN SERPL-MCNC: 33 MG/DL (ref 7–18)
BUN/CREAT SERPL: 10 (ref 12–20)
CALCIUM SERPL-MCNC: 7.8 MG/DL (ref 8.5–10.1)
CHLORIDE SERPL-SCNC: 99 MMOL/L (ref 100–108)
CO2 SERPL-SCNC: 27 MMOL/L (ref 21–32)
CREAT SERPL-MCNC: 3.17 MG/DL (ref 0.6–1.3)
GLUCOSE BLD STRIP.AUTO-MCNC: 133 MG/DL (ref 70–110)
GLUCOSE BLD STRIP.AUTO-MCNC: 196 MG/DL (ref 70–110)
GLUCOSE BLD STRIP.AUTO-MCNC: 212 MG/DL (ref 70–110)
GLUCOSE BLD STRIP.AUTO-MCNC: 84 MG/DL (ref 70–110)
GLUCOSE SERPL-MCNC: 146 MG/DL (ref 74–99)
POTASSIUM SERPL-SCNC: 4 MMOL/L (ref 3.5–5.5)
SODIUM SERPL-SCNC: 135 MMOL/L (ref 136–145)

## 2017-05-03 PROCEDURE — 74011250637 HC RX REV CODE- 250/637: Performed by: INTERNAL MEDICINE

## 2017-05-03 PROCEDURE — 74011250637 HC RX REV CODE- 250/637: Performed by: HOSPITALIST

## 2017-05-03 PROCEDURE — 65660000000 HC RM CCU STEPDOWN

## 2017-05-03 PROCEDURE — 74011636637 HC RX REV CODE- 636/637: Performed by: INTERNAL MEDICINE

## 2017-05-03 PROCEDURE — 74011636637 HC RX REV CODE- 636/637: Performed by: HOSPITALIST

## 2017-05-03 PROCEDURE — 82962 GLUCOSE BLOOD TEST: CPT

## 2017-05-03 PROCEDURE — 80048 BASIC METABOLIC PNL TOTAL CA: CPT | Performed by: INTERNAL MEDICINE

## 2017-05-03 PROCEDURE — 97530 THERAPEUTIC ACTIVITIES: CPT

## 2017-05-03 PROCEDURE — 74011250636 HC RX REV CODE- 250/636: Performed by: INTERNAL MEDICINE

## 2017-05-03 PROCEDURE — 74011000258 HC RX REV CODE- 258: Performed by: INTERNAL MEDICINE

## 2017-05-03 RX ADMIN — CHOLECALCIFEROL TAB 25 MCG (1000 UNIT) 2000 UNITS: 25 TAB at 09:43

## 2017-05-03 RX ADMIN — CLOTRIMAZOLE 10 MG: 10 LOZENGE ORAL at 06:14

## 2017-05-03 RX ADMIN — CLOTRIMAZOLE 10 MG: 10 LOZENGE ORAL at 12:10

## 2017-05-03 RX ADMIN — DOCUSATE SODIUM 100 MG: 100 CAPSULE, LIQUID FILLED ORAL at 09:43

## 2017-05-03 RX ADMIN — INSULIN GLARGINE 20 UNITS: 100 INJECTION, SOLUTION SUBCUTANEOUS at 09:42

## 2017-05-03 RX ADMIN — INSULIN LISPRO 3 UNITS: 100 INJECTION, SOLUTION INTRAVENOUS; SUBCUTANEOUS at 17:06

## 2017-05-03 RX ADMIN — INSULIN LISPRO 6 UNITS: 100 INJECTION, SOLUTION INTRAVENOUS; SUBCUTANEOUS at 22:40

## 2017-05-03 RX ADMIN — SODIUM CHLORIDE 25 ML/HR: 900 INJECTION, SOLUTION INTRAVENOUS at 14:21

## 2017-05-03 RX ADMIN — GABAPENTIN 400 MG: 400 CAPSULE ORAL at 17:01

## 2017-05-03 RX ADMIN — PRAVASTATIN SODIUM 40 MG: 20 TABLET ORAL at 21:13

## 2017-05-03 RX ADMIN — GABAPENTIN 400 MG: 400 CAPSULE ORAL at 09:42

## 2017-05-03 RX ADMIN — CEFTRIAXONE SODIUM 2 G: 2 INJECTION, POWDER, FOR SOLUTION INTRAMUSCULAR; INTRAVENOUS at 10:53

## 2017-05-03 RX ADMIN — CLOTRIMAZOLE 10 MG: 10 LOZENGE ORAL at 17:02

## 2017-05-03 RX ADMIN — Medication 2 TABLET: at 17:01

## 2017-05-03 RX ADMIN — FAMOTIDINE 20 MG: 20 TABLET ORAL at 09:43

## 2017-05-03 RX ADMIN — FOLIC ACID 1 MG: 1 TABLET ORAL at 09:43

## 2017-05-03 RX ADMIN — ALLOPURINOL 100 MG: 100 TABLET ORAL at 09:43

## 2017-05-03 NOTE — CDMP QUERY
Please clarify if this patient is being treated/managed for:    =>Component of ATN  =>Other Explanation of clinical findings  =>Unable to Determine (no explanation of clinical findings)    The medical record reflects the following:    Risk:  78 yo admitted with sepsis treated with vanco and Gent, urinary retention and Dx ARF    Clinical Indicators:  --perez inserted 4/30, 5/2 u NA 40  --4/26 cr 1.43, 4/30 cr 3.06, 5/1 cr 3.37, 5/2 cr 3.40  --etiology of ARF in question with multiple factors    Treatment:   --perez inserted 4/30  --NS at 75 cc hr since 5/1    Please clarify and document your clinical opinion in the progress notes and discharge summary including the definitive and/or presumptive diagnosis, (suspected or probable), related to the above clinical findings. Please include clinical findings supporting your diagnosis. If you DECLINE this query or would like to communicate with [facility], please utilize the \"[facility]â€™s message box\" at the TOP of the Progress Note on the right.       Thank you,  Tennille Lion RN BSN CCDS  [phone]

## 2017-05-03 NOTE — CDMP QUERY
Please clarify if this patient is being treated/managed for:    =>Candidal Cystitis  =>Other Explanation of clinical findings  =>Unable to Determine (no explanation of clinical findings)    The medical record reflects the following:    Risk:  76 yo with urinary retention and ARF    Clinical Indicators:  --perez inserted 4/30,   --5/2 UA cloudy, WBC 4-10, LE mod, yeast 4+  --5/2 Urology PN \"Cont Abx - UA showed yeast, no documentation of treatment. - Will treat with renal dosing of diflucan. And also consider treating Enterococcus in UrineCx (macrobid). \"    Please clarify and document your clinical opinion in the progress notes and discharge summary including the definitive and/or presumptive diagnosis, (suspected or probable), related to the above clinical findings. Please include clinical findings supporting your diagnosis. If you DECLINE this query or would like to communicate with [facility], please utilize the \"[facility] message box\" at the TOP of the Progress Note on the right.       Thank you,  Michelle Reece RN BSN CCDS [phone]

## 2017-05-03 NOTE — PROGRESS NOTES
RENAL DAILY PROGRESS NOTE    Impression & Plan:   IMPRESSION:   · Acute kidney injury, recent episode last week, multiple risk factor ,urinary retention, ( high suspicion ) , poor po intake over last few weeks, ongoing sepsis, ? Septic emboli, Medication toxicity such as was on gentamycin, ? Allergic reaction from ceftriaxone--- Non oliguric, renal function started to improve  · Urinary retention, ? nurogenic bladder  · Right leg weakness  · Heart block, S/p AICD placement   PLAN:   · Decrease  normal saline at rate 25cc/hr, monitor renal function and urine output. · She will need perez for now as suspect neurogenic bladder. Discussed with Dr. Francisco Henry, Dr. Yovany Ashley. Subjective:       Complaint:     Overnight events noted  Feels okay   Daughter at bed side, updated about her current condition. no nausea, vomiting, chest pain, short of breath, cough, seizure.      Current Facility-Administered Medications   Medication Dose Route Frequency    famotidine (PEPCID) tablet 20 mg  20 mg Oral DAILY    0.9% sodium chloride infusion  75 mL/hr IntraVENous CONTINUOUS    insulin lispro (HUMALOG) injection   SubCUTAneous AC&HS    0.9% sodium chloride infusion 250 mL  250 mL IntraVENous PRN    cefTRIAXone (ROCEPHIN) 2 g in 0.9% sodium chloride (MBP/ADV) 50 mL MBP  2 g IntraVENous Q24H    insulin glargine (LANTUS) injection 20 Units  20 Units SubCUTAneous DAILY    diphenhydrAMINE (BENADRYL) capsule 50 mg  50 mg Oral Q4H PRN    polyethylene glycol (MIRALAX) packet 17 g  17 g Oral TID    cholecalciferol (VITAMIN D3) tablet 2,000 Units  2,000 Units Oral DAILY    docusate sodium (COLACE) capsule 100 mg  100 mg Oral DAILY AFTER BREAKFAST    gabapentin (NEURONTIN) capsule 400 mg  400 mg Oral BID    oxyCODONE-acetaminophen (PERCOCET 7.5) 7.5-325 mg per tablet 1 Tab  1 Tab Oral Q4H PRN    pravastatin (PRAVACHOL) tablet 40 mg  40 mg Oral QHS    senna-docusate (PERICOLACE) 8.6-50 mg per tablet 2 Tab  2 Tab Oral PCD    acetaminophen (TYLENOL) tablet 500 mg  500 mg Oral Q6H PRN    allopurinol (ZYLOPRIM) tablet 100 mg  100 mg Oral DAILY    clotrimazole (MYCELEX) frances 10 mg  10 mg Oral 5XD    folic acid (FOLVITE) tablet 1 mg  1 mg Oral DAILY    ondansetron (ZOFRAN) injection 4 mg  4 mg IntraVENous Q8H PRN    glucose chewable tablet 16 g  16 g Oral PRN    glucagon (GLUCAGEN) injection 1 mg  1 mg IntraMUSCular PRN    dextrose (D50W) injection syrg 12.5-25 g  25-50 mL IntraVENous PRN       Review of Symptoms: comprehensive ROS negative except above.    Objective:     Patient Vitals for the past 24 hrs:   Temp Pulse Resp BP SpO2   05/03/17 1111 98.3 °F (36.8 °C) 80 20 128/73 99 %   05/03/17 0847 98.1 °F (36.7 °C) 78 20 122/69 97 %   05/03/17 0400 97.6 °F (36.4 °C) 69 19 104/61 97 %   05/03/17 0000 99.2 °F (37.3 °C) 80 17 97/47 94 %   05/02/17 2000 100.4 °F (38 °C) 92 20 114/66 92 %   05/02/17 1600 99.3 °F (37.4 °C) 99 20 117/70 98 %        Weight change:      05/01 1901 - 05/03 0700  In: 1700 [I.V.:1700]  Out: 1750 [Urine:1600; Drains:150]    Intake/Output Summary (Last 24 hours) at 05/03/17 1430  Last data filed at 05/03/17 0000   Gross per 24 hour   Intake             1700 ml   Output             1200 ml   Net              500 ml     Physical Exam:   General: comfortable, no acute distress   HEENT sclera anicteric, supple neck, no thyromegaly  CVS: S1S2 heard, no rub  RS: + air entry b/l,   Abd: Soft, Non tender,  Neuro: awake  Extrm: + edema, no cyanosis, clubbing   Skin: no visible Rash  Musculoskeletal: No gross joints or bone deformities            Data Review:     LABS:   Hematology:   Recent Labs      05/01/17   0450   WBC  8.8   HGB  8.2*   HCT  24.6*     Chemistry:   Recent Labs      05/03/17   1240  05/02/17   1500  05/01/17   0450   BUN  33*  37*  38*   CREA  3.17*  3.40*  3.37*   CA  7.8*  7.6*  8.1*   K  4.0  4.5  3.9   NA  135*  134*  135*   CL  99*  99*  100   CO2  27  28  27   GLU  146*  90  63* Procedures/imaging: see electronic medical records for all procedures, Xrays and details which were not copied into this note but were reviewed prior to creation of Plan          Assessment & Plan:     As above         Cristal Cárdenas MD  5/3/2017  3:15 PM

## 2017-05-03 NOTE — PROGRESS NOTES
Infectious Disease Progress Note    Requested by: Dr. Nigel Monson, dr. Navya Boucher    Reason: sepsis, acute paraplegia    Current abx Prior abx   Ceftriaxone since 4/24 Cefepime 4/20-4/21  vancomycin  4/20-4/24  Gentamicin 4/21-4/24  Metronidazole  4/20-4/24     Lines:   PICC RUE 4/26    Assessment :    77 y.o., right handed female, with an established history of hypertension, complete heart block with permanent pacemaker placed, diabetes mellitus, diabetic peripheral neuropathy, obesity, admitted to [facility] on 4/20/17. Clinical presentation consistent with  Sepsis (POA)  secondary to group B streptococcus bloodstream infection (positive blood cultures 4/20, negative blood cultures 4/21). Most likely source of bloodstream infection is infected right psoas hematoma/abscess. 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 No signs of neurological recovery on today's exam     2 week h/o pain at the site of abdominal pacemaker, tenderness at the site - however, patient doesn't have erythema at the site of pacemaker. No fluid collection noted at pacemaker pocket site per usg 4/24. Quick clearance of bacteremia would argue against endocarditis/endovascular infection. At this time risk of removal of pacemaker/general pocket change exceed the benefit. Discussed with cardiology. Would recommend close monitoring. Abdominal usg 4/24 doesn't reveal evidence of abscess/fluid collection at the site of pacemaker pocket. Enterococcus in urine cultures 4/20 likely colonizer    Acute renal failure: nephrology consult appreciated. Difficult to determine exact etiology of ARF at this time. Slight improvement in creatinine today. Low grade fever overnight - no clinical evidence of new infection at this time. Will monitor for nosocomial infections. Still with purulent drainage right flank drain. 150cc output yesterday. Unfortunately unable to r/o loculated abscess in absence of contrast.     Recommendations:    1. cont ceftriaxone till 6/4/17  2. Recommend IR re evaluation when drain output less than 10cc (discussed with dr. Matt Jewell)  3. F/u renal function  4. Obtain CXR to r/o hcap (clinical suspicion low)  5. F/u cbc, temp, clinically    Above plan was discussed in details with patient, dr. Félix Leong, dr. Elis Ridley, dr. Caron King . Please call me if any further questions or concerns. Will continue to participate in the care of this patient. subjective:    Feels better. Denies cp, sob. Unable to move legs. decreased pain at the site of pacemaker left abdomen. No nausea, vomiting. No new rash/itching/joint pain/back pain. Home Medication List    Details   gabapentin (NEURONTIN) 400 mg capsule Take 1 Cap by mouth two (2) times a day. Indications: NEUROPATHIC PAIN  Qty: 30 Cap, Refills: 0    Associated Diagnoses: Iliopsoas muscle hematoma, right, subsequent encounter      cholecalciferol (VITAMIN D3) 1,000 unit tablet Take 2 Tabs by mouth daily. Indications: PREVENTION OF VITAMIN D DEFICIENCY  Qty: 30 Tab, Refills: 0      SITagliptin (JANUVIA) 50 mg tablet Take 50 mg by mouth daily. Indications: type 2 diabetes mellitus      potassium chloride (KLOR-CON M20) 20 mEq tablet Take 20 mEq by mouth two (2) times a day. Indications: HYPOKALEMIA PREVENTION      ondansetron (ZOFRAN ODT) 4 mg disintegrating tablet Take 4 mg by mouth every eight (8) hours as needed for Nausea. cyclobenzaprine (FLEXERIL) 10 mg tablet Take 10 mg by mouth as needed for Muscle Spasm(s). Indications: MUSCLE SPASM      carvedilol (COREG) 6.25 mg tablet Take 1 Tab by mouth two (2) times daily (with meals).  Indications: hypertension  Qty: 30 Tab, Refills: 0    Associated Diagnoses: Benign hypertensive heart disease with systolic CHF, NYHA class 2 (HCC)      acetaminophen (TYLENOL) 325 mg tablet Take 2 Tabs by mouth every four (4) hours as needed (for fever or pain level less than 5/10). Indications: Fever, Pain  Qty: 30 Tab, Refills: 0    Associated Diagnoses: Iliopsoas muscle hematoma, right, subsequent encounter      allopurinol (ZYLOPRIM) 100 mg tablet Take 1 Tab by mouth daily. Indications: GOUT  Qty: 15 Tab, Refills: 0    Associated Diagnoses: Chronic gout, unspecified cause, unspecified site      insulin glargine (LANTUS) 100 unit/mL injection 15 Units by SubCUTAneous route nightly. Indications: type 2 diabetes mellitus  Qty: 1 Vial, Refills: 0    Associated Diagnoses: Type 2 diabetes mellitus with diabetic neuropathy, with long-term current use of insulin (HCC)      docusate sodium (COLACE) 100 mg capsule Take 1 Cap by mouth daily (after breakfast). Indications: Constipation  Qty: 15 Cap, Refills: 0      senna-docusate (PERICOLACE) 8.6-50 mg per tablet Take 2 Tabs by mouth daily (after dinner). Indications: Constipation  Qty: 30 Tab, Refills: 0      oxyCODONE-acetaminophen (PERCOCET 7.5) 7.5-325 mg per tablet Take 1 Tab by mouth every four (4) hours as needed (for pain level greater than 4/10). Max Daily Amount: 6 Tabs. Indications: Pain  Qty: 50 Tab, Refills: 0    Associated Diagnoses: Iliopsoas muscle hematoma, right, subsequent encounter      bumetanide (BUMEX) 1 mg tablet TAKE 1 TABLET TWICE A DAY  Qty: 180 Tab, Refills: 1      pravastatin (PRAVACHOL) 40 mg tablet Take 40 mg by mouth nightly. Indications: DYSLIPIDEMIA      ferrous sulfate 325 mg (65 mg iron) tablet Take 1 Tab by mouth two (2) times daily (with meals).   Qty: 30 Tab, Refills: 0      insulin aspart (NOVOLOG) 100 unit/mL injection INITIATE INSULIN CORRECTIVE PROTOCOL (HR): Normal Insulin Sensitivity  For Blood Sugar (mg/dL) of:    Less than 150 =   0 units          150 -199 =   2 units 200 -249 =   4 units 250 -299 =   6 units 300 -349 =   8 units 350 and above =   10 units If 2 glucose readings are above 200 mg/dL  Qty: 10 mL, Refills: 6             Current Facility-Administered Medications   Medication Dose Route Frequency    famotidine (PEPCID) tablet 20 mg  20 mg Oral DAILY    0.9% sodium chloride infusion  75 mL/hr IntraVENous CONTINUOUS    insulin lispro (HUMALOG) injection   SubCUTAneous AC&HS    0.9% sodium chloride infusion 250 mL  250 mL IntraVENous PRN    cefTRIAXone (ROCEPHIN) 2 g in 0.9% sodium chloride (MBP/ADV) 50 mL MBP  2 g IntraVENous Q24H    insulin glargine (LANTUS) injection 20 Units  20 Units SubCUTAneous DAILY    diphenhydrAMINE (BENADRYL) capsule 50 mg  50 mg Oral Q4H PRN    polyethylene glycol (MIRALAX) packet 17 g  17 g Oral TID    cholecalciferol (VITAMIN D3) tablet 2,000 Units  2,000 Units Oral DAILY    docusate sodium (COLACE) capsule 100 mg  100 mg Oral DAILY AFTER BREAKFAST    gabapentin (NEURONTIN) capsule 400 mg  400 mg Oral BID    oxyCODONE-acetaminophen (PERCOCET 7.5) 7.5-325 mg per tablet 1 Tab  1 Tab Oral Q4H PRN    pravastatin (PRAVACHOL) tablet 40 mg  40 mg Oral QHS    senna-docusate (PERICOLACE) 8.6-50 mg per tablet 2 Tab  2 Tab Oral PCD    acetaminophen (TYLENOL) tablet 500 mg  500 mg Oral Q6H PRN    allopurinol (ZYLOPRIM) tablet 100 mg  100 mg Oral DAILY    clotrimazole (MYCELEX) frances 10 mg  10 mg Oral 5XD    folic acid (FOLVITE) tablet 1 mg  1 mg Oral DAILY    ondansetron (ZOFRAN) injection 4 mg  4 mg IntraVENous Q8H PRN    glucose chewable tablet 16 g  16 g Oral PRN    glucagon (GLUCAGEN) injection 1 mg  1 mg IntraMUSCular PRN    dextrose (D50W) injection syrg 12.5-25 g  25-50 mL IntraVENous PRN       Allergies: Vancomycin; Ampicillin; Bactrim [sulfamethoxazole-trimethoprim]; Blueberry; Ciprofloxacin; Codeine; Crestor [rosuvastatin]; Darvocet a500 [propoxyphene n-acetaminophen]; Demerol [meperidine]; Levaquin [levofloxacin]; Lipitor [atorvastatin]; Magnesium oxide; Minocin [minocycline]; Pcn [penicillins]; Pravachol [pravastatin]; Sulfa (sulfonamide antibiotics);  Ultracet [tramadol-acetaminophen]; Vicodin [hydrocodone-acetaminophen]; Vytorin 10-10 [ezetimibe-simvastatin]; and Percodan [oxycodone hcl-oxycodone-asa]    Temp (24hrs), Av.9 °F (37.2 °C), Min:97.6 °F (36.4 °C), Max:100.4 °F (38 °C)    Visit Vitals    /69 (BP 1 Location: Left arm)    Pulse 78    Temp 98.1 °F (36.7 °C)    Resp 20    Ht 5' 7\" (1.702 m)    Wt 112 kg (246 lb 14.4 oz)    SpO2 97%    Breastfeeding No    BMI 38.67 kg/m2       ROS: patient unable to communicate fluently. Detailed ros not feasible. Physical Exam:    General: Well developed, well nourished female laying on the bed AAOx3 NAD    General:   awake alert and oriented   HEENT:  Normocephalic, atraumatic, PERRL, EOMI, no scleral icterus or pallor; no conjunctival hemmohage;  nasal and oral mucous are moist and without evidence of lesions. Neck supple, no bruits. Lymph Nodes:   no cervical, axillary or inguinal adenopathy   Lungs:   non-labored, bilaterally clear to auscultation- no crackles wheezes rales or rhonchi   Heart:   s1 and s2 irregular; no rubs or gallops, no edema, + pedal pulses   Abdomen:  soft, non-distended, active bowel sounds, no hepatomegaly, no splenomegaly. Decreased tenderness over the left abdominal pacemaker, no overlying erythema or fluctuance   Genitourinary:  deferred   Extremities:   no clubbing, cyanosis; no joint effusions or swelling; muscle mass appropriate for age   Neurologic:  No gross focal sensory abnormalities; 5/5 muscle strength to upper extremities. 0/5 strength in lower extremities.   Cranial nerves intact                        Skin:  Surgical scars abdomen, left neck well healed   Back:  right flank drain with purulent drainage,  no paraspinal muscle guarding or rigidity, no CVA tenderness     Psychiatric:  No suicidal or homicidal ideations, appropriate mood and affect         Labs: Results:   Chemistry Recent Labs      17   1500  17   0450   GLU  90  63*   NA  134*  135*   K  4.5  3.9   CL  99*  100 CO2  28  27   BUN  37*  38*   CREA  3.40*  3.37*   CA  7.6*  8.1*   AGAP  7  8   BUCR  11*  11*      CBC w/Diff Recent Labs      05/01/17   0450   WBC  8.8   RBC  3.16*   HGB  8.2*   HCT  24.6*   PLT  188   GRANS  83*   LYMPH  7*   EOS  2      Microbiology No results for input(s): CULT in the last 72 hours.        RADIOLOGY:    All available imaging studies/reports in [facility] for this admission were reviewed    Dr. Radhika Barboza, Infectious Disease Specialist  [phone]  May 3, 2017  3:49 PM

## 2017-05-03 NOTE — PROGRESS NOTES
Problem: Mobility Impaired (Adult and Pediatric)  Goal: *Acute Goals and Plan of Care (Insert Text)  STGs to be addressed within 3 days:  1. Bed mobility: Supine to sit to supine MaxAx1 with HR for meals. 2. Activity tolerance: Tolerate EOB > 15 minutes for ADLs. 3. Transfers: SPT-->to chair max/mod A with LRAD for ADLs. 4. Pt demo fair seated balance in preparation for functional transfers. LTGs to be addressed within 7 days:  1. Transfers: SB-->to chair/wc max/mod A for ADLs. 2. Activity tolerance: Tolerated > 1 hr in chair for change of position. 3. Patient Education: Independent with HEP for home safety. PHYSICAL THERAPY TREATMENT     Patient: Katlyn Kendrick (43 y.o. female)  Date: 5/3/2017  Diagnosis: Acute paraplegia  Acute paraplegia [facility]) Acute paraplegia [facility])       Precautions: Fall, Skin  Chart, physical therapy assessment, plan of care and goals were reviewed. ASSESSMENT:  Pt demo's increased sitting tolerance as indicated by the ability to maintain >10 min's seated EOB. Pt is in need of understanding and encouragement,however, is in good spirits despite difficulties. Progression toward goals:  [ ]      Improving appropriately and progressing toward goals  [X]      Improving slowly and progressing toward goals  [ ]      Not making progress toward goals and plan of care will be adjusted       PLAN:  Patient continues to benefit from skilled intervention to address the above impairments. Continue treatment per established plan of care. Discharge Recommendations:  Rehab/[facility] rehab  Further Equipment Recommendations for Discharge:  N/A          SUBJECTIVE:   Patient stated I'm having a hard day.       OBJECTIVE DATA SUMMARY:   Critical Behavior:  Neurologic State: Alert  Orientation Level: Oriented X4  Cognition: Appropriate for age attention/concentration  Safety/Judgement: Awareness of environment, Fall prevention  Functional Mobility Training:  Bed Mobility:  Rolling: Maximum assistance  Supine to Sit: Maximum assistance  Sit to Supine: Maximum assistance  Balance:  Sitting: Impaired; With support  Sitting - Static: Poor (constant support) (Poor+  unilateral UE movement in semicircular )  Sitting - Dynamic: Poor (constant support)   Seated EOB: x's >10 min's with intermittent bilateral UE support and SBA. Ambulation/Gait Training:  Gait Description (WDL):  (N/A)  Therapeutic Exercises:   Single limb press x's 5 each LE  Pain:  Pt reports 7/10 pain or discomfort prior to treatment. Pt reports 7/10 pain or discomfort post treatment. Activity Tolerance:   Fair-  Limited significantly by LE and abdominal pain. Please refer to the flowsheet for vital signs taken during this treatment.   After treatment:   [ ] Patient left in no apparent distress sitting up in chair  [X] Patient left in no apparent distress in bed  [X] Call bell left within reach  [ ] Nursing notified  [ ] Caregiver present  [ ] Bed alarm activated      Mitchell Strange PTA   Time Calculation: 23 mins

## 2017-05-03 NOTE — ROUTINE PROCESS
Spoke with [facility], RN and requested patient be NPO past midnight for vascular study in the morning.

## 2017-05-03 NOTE — PROGRESS NOTES
[facility] - Progress Note  Admit Date: 4/20/2017    Assessment:     Hospital Problems  Date Reviewed: 4/20/2017          Codes Class Noted POA    * (Principal)Acute paraplegia ([facility] ICD-10-CM: G82.20  ICD-9-CM: 344.1  4/20/2017 Yes        Sepsis ([facility] ICD-10-CM: A41.9  ICD-9-CM: 038.9, 995.91  4/20/2017 Yes        Psoas abscess, right ([facility] ICD-10-CM: N44.79  ICD-9-CM: 567.31  4/20/2017 Yes        Krueger catheter in place on admission ICD-10-CM: Z96.0  ICD-9-CM: V45.89  4/20/2017 Yes        Urinary tract infection due to Enterococcus ICD-10-CM: N39.0, B95.2  ICD-9-CM: 599.0, 041.04  4/20/2017 Yes        Group B streptococcal infection ICD-10-CM: A49.1  ICD-9-CM: 041.02  4/20/2017 Yes        History of Coumadin therapy ICD-10-CM: Z79.01  ICD-9-CM: V58.61  Unknown Yes    Overview Signed 4/6/2017 10:35 PM by Chriss Hawthorne MD     Anticoagulation for chronic atrial fibrillation; Discontinued on 3/30/2017             Decreased calculated glomerular filtration rate (GFR) ICD-10-CM: R94.4  ICD-9-CM: 794.4  3/30/2017 Yes    Overview Signed 4/6/2017  5:47 PM by Chriss Hawthorne MD     Calculated GFR equivalent to that of CKD stage 3 = 30-59 ml/min             Iliopsoas muscle hematoma ICD-10-CM: S70.10XA  ICD-9-CM: 924.00  3/30/2017 Yes        Psoas hematoma, right, secondary to anticoagulant therapy ICD-10-CM: S30. 1XXA  ICD-9-CM: 924.9, E934.2  3/30/2017 Yes        Impaired mobility and ADLs ICD-10-CM: Z74.09  ICD-9-CM: 799.89  3/30/2017 Yes        Benign hypertensive heart disease with systolic CHF, NYHA class 2 (HCC) (Chronic) ICD-10-CM: I11.0, I50.20  ICD-9-CM: 402.11, 428.20, 428.0  9/5/2012 Yes        [facility]ICD generator infection ([facility] ICD-10-CM: T82. 7XXA  ICD-9-CM: 996.61  8/20/2012 Yes    Overview Signed 8/20/2012  6:13 PM by Karishma Rodriguez MD     S/p explant 4 leads 8/20/12             Type 2 diabetes mellitus with diabetic neuropathy (HCC) (Chronic) ICD-10-CM: E11.40  ICD-9-CM: 250.60, 357.2  6/28/2011 Yes              -Pacemaker pocket pain, cannot exclude seeding from abscess. Limited options given device dependence and obstruction. Treating conservatively. -Sepsis secondary to infected right psoas hematoma/epidural abscess with neurological compromise resulting in paraplegia. Now s/p drain by IR.  -Heart block s/p AICD 2005 with upgrade to BiV later that year, removed however in 2012 due to trauma and infection. New biventricular pacemaker LUQ 9/2012 by Dr. 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 Normal function 3/2017. Patient does report discomfort left upper quadrant following fall last month. Right axillary vein obstruction by venogram 2012.   -Elevated troponin likely demand ischemia in setting of sepsis. -H/p transient nonischemic CMY with EF 45% (45-50% 6/2015 with patent coronaries on cath 1996). No ischemia on nuclear 6/2015.  -Acute kidney injury, suspect multifactorial.  -Diabetes mellitus.  -Dyslipidemia. -H/o HTN. -LBBB. -HALI on CPAP. -H/o DVT. Plan:     Continue current therapy, will followup pacemaker pocket clinically. Discussed with Dr. Shalini Jensen. Subjective:     No new complaints.   Tm 100.4    Objective:      Patient Vitals for the past 8 hrs:   Temp Pulse Resp BP SpO2   05/03/17 0847 98.1 °F (36.7 °C) 78 20 122/69 97 %   05/03/17 0400 97.6 °F (36.4 °C) 69 19 104/61 97 %         Patient Vitals for the past 96 hrs:   Weight   05/02/17 0431 112 kg (246 lb 14.4 oz)   05/01/17 0736 111.3 kg (245 lb 6 oz)   04/30/17 0528 111.3 kg (245 lb 4.8 oz)                    Intake/Output Summary (Last 24 hours) at 05/03/17 1011  Last data filed at 05/03/17 0000   Gross per 24 hour   Intake             1700 ml   Output             1200 ml   Net              500 ml       Physical Exam:  General:  alert, cooperative, no distress, appears stated age  Neck:  nontender  Lungs:  clear to auscultation bilaterally  Heart:  regular rate and rhythm, S1, S2 normal, no murmur, click, rub or gallop  Abdomen:  abdomen is soft without significant tenderness, masses, organomegaly or guarding  Extremities:  Extremities decreased movement    Data Review:     Labs: Results:       Chemistry Recent Labs      05/02/17   1500  05/01/17   0450   GLU  90  63*   NA  134*  135*   K  4.5  3.9   CL  99*  100   CO2  28  27   BUN  37*  38*   CREA  3.40*  3.37*   CA  7.6*  8.1*   AGAP  7  8   BUCR  11*  11*      CBC w/Diff Recent Labs      05/01/17   0450   WBC  8.8   RBC  3.16*   HGB  8.2*   HCT  24.6*   PLT  188   GRANS  83*   LYMPH  7*   EOS  2      Cardiac Enzymes Lab Results   Component Value Date/Time    CPK 91 05/02/2017 12:40 PM      Coagulation No results for input(s): PTP, INR, APTT in the last 72 hours. No lab exists for component: INREXT    Lipid Panel Lab Results   Component Value Date/Time    Cholesterol, total 154 07/24/2012 01:00 AM    HDL Cholesterol 48 07/24/2012 01:00 AM    LDL, calculated 90.4 07/24/2012 01:00 AM    VLDL, calculated 15.6 07/24/2012 01:00 AM    Triglyceride 78 07/24/2012 01:00 AM    CHOL/HDL Ratio 3.2 07/24/2012 01:00 AM      BNP Lab Results   Component Value Date/Time    B-type Natriuretic Peptide 111.9 11/19/2013 10:17 AM      Liver Enzymes No results for input(s): TP, ALB, TBIL, AP, SGOT, GPT in the last 72 hours.     No lab exists for component: DBIL   Digoxin    Thyroid Studies Lab Results   Component Value Date/Time    TSH 0.51 04/20/2017 07:26 PM          Signed By: Ramona Pritchett MD     May 3, 2017

## 2017-05-03 NOTE — ROUTINE PROCESS
Bedside, Verbal and Written shift change report given to [facility] (oncoming nurse) by Rosana SANCHEZ(offgoing nurse). Report included the following information SBAR, Kardex, and MAR. Hourly rounding and  chart checks completed.

## 2017-05-03 NOTE — PROGRESS NOTES
[facility] Hospitalist Group  Progress Note    Patient: Amanda Garcia Age: 77 y.o. : 1950 MR#: [id number] SSN: [id number]  Date/Time: 5/3/2017     Subjective:     No chills   No SOB, No NVD  Feels same   No to little sensations in legs     Assessment/Plan:    77 yr old Female with PMH of iliopsoas hematoma , patient developed sudden onset of Right  leg weakness , patient evaluated by rehab MD and was transferred to medical service for further management .  Dr Isabel Castellano - spine surgery consulted , neurology consulted , patient is admitted to ICU        1 Right Leg weakness   - Infected Psoas  hematoma s/p drain , IR to reevaluate   - continue IV antibiotics -ceftriaxone till 2017  - Follow with ID     2 CMO - LBBB, - followed by cardiology   - H/O PACE Maker placement for CHB   - no evidence of pacemaker pocket infection       3 DM2  - no hypoglycemia noted   - Continue current management     4 H/O DVT off  AC - due to Bleed     5 Elevated trop - resolved     6 Acute blood loss anemia   - stable       7 UTI- POA   - Continue current antibiotics   -  urine culture - E Fecalis     8 LISBETH   - Still cr is high   - On IV hydration etiology not clear - renal following   - Urology consult reviewed and appreciated   - ? neurogenic bladder - clinically - bladder dynamic study as out patient   - renal artery duplex       D/w patient and her family in room with her   On discharge they would like to go home       Case discussed with:  [x]Patient  [x]Family  [x]Nursing  []Case Management  DVT Prophylaxis:  []Lovenox  []Hep SQ  [x]SCDs  []Coumadin   []On Heparin gtt    Patient Active Problem List   Diagnosis Code    Nonischemic cardiomyopathy ([facility] I42.8    History of complete heart block Z86.79    Biventricular implantable cardioverter-defibrillator in situ Z95.810    Left bundle branch block (LBBB) on electrocardiogram I44.7    Type 2 diabetes mellitus with diabetic neuropathy ([facility] E11.40    Dyslipidemia E78.5    Diabetic neuropathy associated with type 2 diabetes mellitus ([facility] E11.40    Obstructive sleep apnea on CPAP G47.33, Z99.89    AICD generator infection ([facility] T82. 7XXA    Difficult airway for intubation T88. 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 1XXA    Impaired mobility and ADLs Z74.09    History of Coumadin therapy Z79.01    Gout M10.9    Acute paraplegia (HCC) G82.20    Sepsis ([facility] A41.9    Psoas abscess, right ([facility] K68.12    Krueger catheter in place on admission Z96.0    Urinary tract infection due to Enterococcus N39.0, B95.2    Group B streptococcal infection A49.1       Objective:   VS:   Visit Vitals    /73 (BP 1 Location: Left arm)    Pulse 80    Temp 98.3 °F (36.8 °C)    Resp 20    Ht 5' 7\" (1.702 m)    Wt 112 kg (246 lb 14.4 oz)    SpO2 99%    Breastfeeding No    BMI 38.67 kg/m2      Tmax/24hrs: Temp (24hrs), Av.8 °F (37.1 °C), Min:97.6 °F (36.4 °C), Max:100.4 °F (38 °C)  IOBRIEF    Intake/Output Summary (Last 24 hours) at 17 1235  Last data filed at 17 0000   Gross per 24 hour   Intake             1700 ml   Output             1200 ml   Net              500 ml       General:  Appears in better mood and comfortable   HEENT:  NC, Atraumatic. PERRLA, anicteric sclerae. Pulmonary:  CTA Bilaterally. No Wheezing/Rhonchi/Rales. Cardiovascular: Regular rate and Rhythm.   GI:  Soft, Non distended, Non tender. + Bowel sounds. Extremities:  No edema, cyanosis, clubbing. No calf tenderness. Psych:  Not anxious or agitated. Neurologic: Grossly - Motor and Sensory functions are intact .  No Anxiety , calm , no Agitation         Medications:   Current Facility-Administered Medications   Medication Dose Route Frequency    famotidine (PEPCID) tablet 20 mg  20 mg Oral DAILY    0.9% sodium chloride infusion  75 mL/hr IntraVENous CONTINUOUS    insulin lispro (HUMALOG) injection   SubCUTAneous AC&HS    0.9% sodium chloride infusion 250 mL  250 mL IntraVENous PRN    cefTRIAXone (ROCEPHIN) 2 g in 0.9% sodium chloride (MBP/ADV) 50 mL MBP  2 g IntraVENous Q24H    insulin glargine (LANTUS) injection 20 Units  20 Units SubCUTAneous DAILY    diphenhydrAMINE (BENADRYL) capsule 50 mg  50 mg Oral Q4H PRN    polyethylene glycol (MIRALAX) packet 17 g  17 g Oral TID    cholecalciferol (VITAMIN D3) tablet 2,000 Units  2,000 Units Oral DAILY    docusate sodium (COLACE) capsule 100 mg  100 mg Oral DAILY AFTER BREAKFAST    gabapentin (NEURONTIN) capsule 400 mg  400 mg Oral BID    oxyCODONE-acetaminophen (PERCOCET 7.5) 7.5-325 mg per tablet 1 Tab  1 Tab Oral Q4H PRN    pravastatin (PRAVACHOL) tablet 40 mg  40 mg Oral QHS    senna-docusate (PERICOLACE) 8.6-50 mg per tablet 2 Tab  2 Tab Oral PCD    acetaminophen (TYLENOL) tablet 500 mg  500 mg Oral Q6H PRN    allopurinol (ZYLOPRIM) tablet 100 mg  100 mg Oral DAILY    clotrimazole (MYCELEX) frances 10 mg  10 mg Oral 5XD    folic acid (FOLVITE) tablet 1 mg  1 mg Oral DAILY    ondansetron (ZOFRAN) injection 4 mg  4 mg IntraVENous Q8H PRN    glucose chewable tablet 16 g  16 g Oral PRN    glucagon (GLUCAGEN) injection 1 mg  1 mg IntraMUSCular PRN    dextrose (D50W) injection syrg 12.5-25 g  25-50 mL IntraVENous PRN       Labs:    Recent Results (from the past 24 hour(s))   CK    Collection Time: 05/02/17 12:40 PM   Result Value Ref Range    CK 91 26 - 791 U/L   METABOLIC PANEL, BASIC Collection Time: 05/02/17  3:00 PM   Result Value Ref Range    Sodium 134 (L) 136 - 145 mmol/L    Potassium 4.5 3.5 - 5.5 mmol/L    Chloride 99 (L) 100 - 108 mmol/L    CO2 28 21 - 32 mmol/L    Anion gap 7 3.0 - 18 mmol/L    Glucose 90 74 - 99 mg/dL    BUN 37 (H) 7.0 - 18 MG/DL    Creatinine 3.40 (H) 0.6 - 1.3 MG/DL    BUN/Creatinine ratio 11 (L) 12 - 20      GFR est AA 16 (L) >60 ml/min/1.73m2    GFR est non-AA 14 (L) >60 ml/min/1.73m2    Calcium 7.6 (L) 8.5 - 10.1 MG/DL   GLUCOSE, POC    Collection Time: 05/02/17  4:31 PM   Result Value Ref Range    Glucose (POC) 104 70 - 110 mg/dL   GLUCOSE, POC    Collection Time: 05/02/17  8:40 PM   Result Value Ref Range    Glucose (POC) 103 70 - 110 mg/dL   GLUCOSE, POC    Collection Time: 05/03/17  6:31 AM   Result Value Ref Range    Glucose (POC) 84 70 - 110 mg/dL   GLUCOSE, POC    Collection Time: 05/03/17 11:34 AM   Result Value Ref Range    Glucose (POC) 133 (H) 70 - 110 mg/dL       Signed By: Bruno Rodriguez MD     May 3, 2017

## 2017-05-04 ENCOUNTER — APPOINTMENT (OUTPATIENT)
Dept: GENERAL RADIOLOGY | Age: 67
DRG: 853 | End: 2017-05-04
Attending: INTERNAL MEDICINE
Payer: COMMERCIAL

## 2017-05-04 ENCOUNTER — APPOINTMENT (OUTPATIENT)
Dept: CT IMAGING | Age: 67
DRG: 853 | End: 2017-05-04
Attending: INTERNAL MEDICINE
Payer: COMMERCIAL

## 2017-05-04 LAB
ANION GAP BLD CALC-SCNC: 8 MMOL/L (ref 3–18)
BASOPHILS # BLD AUTO: 0 K/UL (ref 0–0.1)
BASOPHILS # BLD AUTO: 0.1 K/UL (ref 0–0.06)
BASOPHILS # BLD: 1 % (ref 0–2)
BASOPHILS # BLD: 1 % (ref 0–3)
BUN SERPL-MCNC: 32 MG/DL (ref 7–18)
BUN/CREAT SERPL: 10 (ref 12–20)
CALCIUM SERPL-MCNC: 7.8 MG/DL (ref 8.5–10.1)
CHLORIDE SERPL-SCNC: 103 MMOL/L (ref 100–108)
CO2 SERPL-SCNC: 25 MMOL/L (ref 21–32)
CREAT SERPL-MCNC: 3.05 MG/DL (ref 0.6–1.3)
DIFFERENTIAL METHOD BLD: ABNORMAL
DIFFERENTIAL METHOD BLD: ABNORMAL
EOSINOPHIL # BLD: 0.1 K/UL (ref 0–0.4)
EOSINOPHIL # BLD: 0.1 K/UL (ref 0–0.4)
EOSINOPHIL NFR BLD: 2 % (ref 0–5)
EOSINOPHIL NFR BLD: 2 % (ref 0–5)
ERYTHROCYTE [DISTWIDTH] IN BLOOD BY AUTOMATED COUNT: 16.2 % (ref 11.6–14.5)
ERYTHROCYTE [DISTWIDTH] IN BLOOD BY AUTOMATED COUNT: 16.3 % (ref 11.6–14.5)
GLUCOSE BLD STRIP.AUTO-MCNC: 130 MG/DL (ref 70–110)
GLUCOSE BLD STRIP.AUTO-MCNC: 159 MG/DL (ref 70–110)
GLUCOSE BLD STRIP.AUTO-MCNC: 167 MG/DL (ref 70–110)
GLUCOSE BLD STRIP.AUTO-MCNC: 81 MG/DL (ref 70–110)
GLUCOSE BLD STRIP.AUTO-MCNC: 92 MG/DL (ref 70–110)
GLUCOSE SERPL-MCNC: 71 MG/DL (ref 74–99)
HCT VFR BLD AUTO: 20.4 % (ref 35–45)
HCT VFR BLD AUTO: 22.5 % (ref 35–45)
HGB BLD-MCNC: 6.7 G/DL (ref 12–16)
HGB BLD-MCNC: 7.5 G/DL (ref 12–16)
LYMPHOCYTES # BLD AUTO: 18 % (ref 20–51)
LYMPHOCYTES # BLD AUTO: 22 % (ref 21–52)
LYMPHOCYTES # BLD: 1 K/UL (ref 0.8–3.5)
LYMPHOCYTES # BLD: 1.2 K/UL (ref 0.9–3.6)
MCH RBC QN AUTO: 25.6 PG (ref 24–34)
MCH RBC QN AUTO: 26.3 PG (ref 24–34)
MCHC RBC AUTO-ENTMCNC: 32.8 G/DL (ref 31–37)
MCHC RBC AUTO-ENTMCNC: 33.3 G/DL (ref 31–37)
MCV RBC AUTO: 77.9 FL (ref 74–97)
MCV RBC AUTO: 78.9 FL (ref 74–97)
MONOCYTES # BLD: 0.4 K/UL (ref 0.05–1.2)
MONOCYTES # BLD: 0.5 K/UL (ref 0–1)
MONOCYTES NFR BLD AUTO: 7 % (ref 3–10)
MONOCYTES NFR BLD AUTO: 9 % (ref 2–9)
NEUTS BAND NFR BLD MANUAL: 11 % (ref 0–5)
NEUTS SEG # BLD: 3.8 K/UL (ref 1.8–8)
NEUTS SEG # BLD: 4.1 K/UL (ref 1.8–8)
NEUTS SEG NFR BLD AUTO: 59 % (ref 42–75)
NEUTS SEG NFR BLD AUTO: 68 % (ref 40–73)
PLATELET # BLD AUTO: 189 K/UL (ref 135–420)
PLATELET # BLD AUTO: 210 K/UL (ref 135–420)
PLATELET COMMENTS,PCOM: ABNORMAL
PMV BLD AUTO: 8.5 FL (ref 9.2–11.8)
PMV BLD AUTO: 9.5 FL (ref 9.2–11.8)
POTASSIUM SERPL-SCNC: 3.8 MMOL/L (ref 3.5–5.5)
RBC # BLD AUTO: 2.62 M/UL (ref 4.2–5.3)
RBC # BLD AUTO: 2.85 M/UL (ref 4.2–5.3)
RBC MORPH BLD: ABNORMAL
SODIUM SERPL-SCNC: 136 MMOL/L (ref 136–145)
WBC # BLD AUTO: 5.6 K/UL (ref 4.6–13.2)
WBC # BLD AUTO: 5.8 K/UL (ref 4.6–13.2)

## 2017-05-04 PROCEDURE — 74176 CT ABD & PELVIS W/O CONTRAST: CPT

## 2017-05-04 PROCEDURE — 82962 GLUCOSE BLOOD TEST: CPT

## 2017-05-04 PROCEDURE — 85025 COMPLETE CBC W/AUTO DIFF WBC: CPT | Performed by: INTERNAL MEDICINE

## 2017-05-04 PROCEDURE — 74011636637 HC RX REV CODE- 636/637: Performed by: HOSPITALIST

## 2017-05-04 PROCEDURE — 74011250636 HC RX REV CODE- 250/636: Performed by: INTERNAL MEDICINE

## 2017-05-04 PROCEDURE — 93975 VASCULAR STUDY: CPT

## 2017-05-04 PROCEDURE — 74011000258 HC RX REV CODE- 258: Performed by: INTERNAL MEDICINE

## 2017-05-04 PROCEDURE — 74011250637 HC RX REV CODE- 250/637: Performed by: HOSPITALIST

## 2017-05-04 PROCEDURE — 80048 BASIC METABOLIC PNL TOTAL CA: CPT | Performed by: INTERNAL MEDICINE

## 2017-05-04 PROCEDURE — 71010 XR CHEST PORT: CPT

## 2017-05-04 PROCEDURE — 65660000000 HC RM CCU STEPDOWN

## 2017-05-04 PROCEDURE — 74011636637 HC RX REV CODE- 636/637: Performed by: INTERNAL MEDICINE

## 2017-05-04 PROCEDURE — 74011250637 HC RX REV CODE- 250/637: Performed by: INTERNAL MEDICINE

## 2017-05-04 RX ORDER — SODIUM CHLORIDE 9 MG/ML
250 INJECTION, SOLUTION INTRAVENOUS AS NEEDED
Status: DISCONTINUED | OUTPATIENT
Start: 2017-05-04 | End: 2017-05-09

## 2017-05-04 RX ADMIN — CLOTRIMAZOLE 10 MG: 10 LOZENGE ORAL at 19:16

## 2017-05-04 RX ADMIN — FOLIC ACID 1 MG: 1 TABLET ORAL at 11:11

## 2017-05-04 RX ADMIN — ACETAMINOPHEN 500 MG: 500 TABLET, COATED ORAL at 22:08

## 2017-05-04 RX ADMIN — SODIUM CHLORIDE 25 ML/HR: 900 INJECTION, SOLUTION INTRAVENOUS at 10:58

## 2017-05-04 RX ADMIN — CLOTRIMAZOLE 10 MG: 10 LOZENGE ORAL at 07:37

## 2017-05-04 RX ADMIN — DOCUSATE SODIUM 100 MG: 100 CAPSULE, LIQUID FILLED ORAL at 11:11

## 2017-05-04 RX ADMIN — Medication 2 TABLET: at 18:06

## 2017-05-04 RX ADMIN — INSULIN GLARGINE 20 UNITS: 100 INJECTION, SOLUTION SUBCUTANEOUS at 11:16

## 2017-05-04 RX ADMIN — PRAVASTATIN SODIUM 40 MG: 20 TABLET ORAL at 22:08

## 2017-05-04 RX ADMIN — ALLOPURINOL 100 MG: 100 TABLET ORAL at 11:11

## 2017-05-04 RX ADMIN — GABAPENTIN 400 MG: 400 CAPSULE ORAL at 11:11

## 2017-05-04 RX ADMIN — CHOLECALCIFEROL TAB 25 MCG (1000 UNIT) 2000 UNITS: 25 TAB at 11:11

## 2017-05-04 RX ADMIN — FAMOTIDINE 20 MG: 20 TABLET ORAL at 11:11

## 2017-05-04 RX ADMIN — CEFTRIAXONE SODIUM 2 G: 2 INJECTION, POWDER, FOR SOLUTION INTRAMUSCULAR; INTRAVENOUS at 10:58

## 2017-05-04 RX ADMIN — INSULIN LISPRO 3 UNITS: 100 INJECTION, SOLUTION INTRAVENOUS; SUBCUTANEOUS at 22:09

## 2017-05-04 RX ADMIN — CLOTRIMAZOLE 10 MG: 10 LOZENGE ORAL at 11:11

## 2017-05-04 RX ADMIN — ACETAMINOPHEN 500 MG: 500 TABLET, COATED ORAL at 07:11

## 2017-05-04 RX ADMIN — GABAPENTIN 400 MG: 400 CAPSULE ORAL at 18:07

## 2017-05-04 NOTE — PROGRESS NOTES
Problem: Mobility Impaired (Adult and Pediatric)  Goal: *Acute Goals and Plan of Care (Insert Text)  STGs to be addressed within 3 days:  1. Bed mobility: Supine to sit to supine MaxAx1 with HR for meals. 2. Activity tolerance: Tolerate EOB > 15 minutes for ADLs. 3. Transfers: SPT-->to chair max/mod A with LRAD for ADLs. 4. Pt demo fair seated balance in preparation for functional transfers. LTGs to be addressed within 7 days:  1. Transfers: SB-->to chair/wc max/mod A for ADLs. 2. Activity tolerance: Tolerated > 1 hr in chair for change of position. 3. Patient Education: Independent with HEP for home safety. Time: 1320                 Pt treatment being held this afternoon 2/2 significantly low Hgb (6.7). Will follow up as pt recovers to therapeutic levels.      Rosie Valle PTA  5/4/2017

## 2017-05-04 NOTE — PROCEDURES
[facility]  [facility] ***    Name: Rolando Cisneros  MRN: [id number]    Inpatient  : 1950  HIS Order #: [id number]  [address] [facility] Visit #: [id number]  Date: 04 May 2017    TYPE OF TEST: Visceral Arterial Duplex    REASON FOR TEST    Aortic PSV: 183.0 cm/s  Diameter AP:     cm   TV:     cm                   Right          Left  Renal Artery:- -------------  -------------  Proximal  PSV:                 72.3  Mid       PSV:  79.7           77.1  Distal    PSV:  91.6           75.6  Aortic ratio :   0.5            0.4    Medullary PSV:  41.0           33.4            EDV:   6.4            3.6            EDR:   0.2            0.1            SDR:   6.4            9.3    Cortical  PSV:  28.7           45.7            EDV:   4.1            4.5            EDR:   0.1            0.1            SDR:   7.0           10.2  Stenosis:  Kidney size:   11.4 cm        13.6 cm               x  6.1 cm      x  6.8 cm    Hilar:-        Right          Left  Acc. Time  AT:     secs           secs  Acc. Index AI:             RI:    INTERPRETATION/FINDINGS  1. Note: High aortic PSV invalidates the use of RAR as an indicator of   renal stenosis. RENAL:  1. No evidence of stenosis greater than 60% in the bilateral renal  arteries. 2. The right kidney measures 11.4 cm. 3. The left kidney measures 13.6 cm.  4. Bilateral intrinsic/medical renal disease identified. 5. Bilateral renal veins are patent with pulsatile flow. ADDITIONAL COMMENTS  Technically limited and difficult study due to body habitus, pain,  positioning, and bowel gas. Unable to visualize the proximal right  renal artery or the mid to distal abdominal aorta. I have personally reviewed the data relevant to the interpretation of  this  study.     TECHNOLOGIST: Nikki Thomas RVT  Signed: 2017 03:41 PM    PHYSICIAN: Ila Wilcox MD  Signed: 2017 08:52 AM

## 2017-05-04 NOTE — PROGRESS NOTES
NUTRITION    Screen     RECOMMENDATIONS / PLAN:     - Continue with current nutrition interventions   - Continue RD inpatient monitoring and evaluation. NUTRITION INTERVENTIONS & DIAGNOSIS:     [x] Meals/Snacks: modified diet   [x] Medical food supplementation:  Glucerna Shake TID  [x] Vitamin/mineral supplementation: folic acid, vitamin D3    Nutrition Diagnosis: Inadequate energy intake related to decreased appetite as evidenced by decreased meal intake PTA and since admission    ASSESSMENT:     Subjective/Objective:  Patient reported appetite and meal intake are fair. Intake of supplements are fair/good. Pt was NPO this morning for test; diet and supplements resumed. Asked about food preferences; pt did not provide any new/update preferences.  Encouraged po intake meals and supplements    Average po intake adequate to meet patients estimated nutritional needs:   [] Yes     [x] No   [] Unable to determine at this time    Diet: DIET NUTRITIONAL SUPPLEMENTS All Meals; GLUCERNA SHAKE  DIET DIABETIC CONSISTENT CARB Regular      Food Allergies: blueberries  Current Appetite:   [] Good     [x] Fair     [] Poor     [] Other:    Appetite/meal intake prior to admission:   [] Good     [] Fair     [x] Poor (since admission to [facility])    [] Other:  Feeding Limitations:  [] Swallowing difficulty    [] Chewing difficulty    [] Other:  Current Meal Intake:   Patient Vitals for the past 100 hrs:   % Diet Eaten   05/04/17 1045 0 %     BM: 5/3  Skin Integrity:  Callus on right foot   Edema:  2+ LEs  Pertinent Medications: Reviewed; colace, miralax (refusing), dulcolax ; NS at 25 mL/hr    Recent Labs      05/04/17   0415  05/03/17   1240  05/02/17   1500   NA  136  135*  134*   K  3.8  4.0  4.5   CL  103  99*  99*   CO2  25  27  28   GLU  71*  146*  90   BUN  32*  33*  37*   CREA  3.05*  3.17*  3.40*   CA  7.8*  7.8*  7.6*       Intake/Output Summary (Last 24 hours) at 05/04/17 1224  Last data filed at 05/04/17 1045   Gross per 24 hour   Intake                0 ml   Output                0 ml   Net                0 ml       Anthropometrics:  Ht Readings from Last 1 Encounters:   04/30/17 5' 7\" (1.702 m)     Last 3 Recorded Weights in this Encounter    05/01/17 0736 05/02/17 0431 05/04/17 0508   Weight: 111.3 kg (245 lb 6 oz) 112 kg (246 lb 14.4 oz) 109 kg (240 lb 4.8 oz)     Body mass index is 37.64 kg/(m^2). Weight History:  Per chart review, patient reported usual body weight is 240 lbs. Weight Metrics 5/4/2017 4/20/2017 4/6/2017 4/6/2017 3/30/2017 3/16/2017 3/14/2017   Weight 240 lb 4.8 oz - 233 lb 227 lb 1.6 oz - 240 lb 243 lb   BMI - 37.64 kg/m2 36.49 kg/m2 - 35.57 kg/m2 37.59 kg/m2 38.06 kg/m2        Admitting Diagnosis: Acute paraplegia  Acute paraplegia [facility])  Past Medical History:   Diagnosis Date    Acute paraplegia ([facility] 4/20/2017    Benign hypertensive heart disease with systolic CHF, NYHA class 2 ([facility] 9/5/2012    Biventricular implantable cardioverter-defibrillator in situ 04/28/2005    Upgraded to BiV AICD; gen change 4/2008; pocket revision 10/2009; Abdominal - done on 8/22/2012 by Dr. Ashwin Lloyd Cardiac cath 08/15/1996    Patent coronaries. Elev LVEDP. EF 50-55%.  Cardiac echocardiogram 06/23/2015    Ltd study. EF 45-50%. Mild, diffuse hypk. Severe apical hypk. No mass or thrombus was clearly identified, although imaging was suboptimal.      Cardiac nuclear imaging test 06/19/2015    Fixed distal apical, distal septal defect more likely due to RV pacing than prior infarct. No ischemia. EF 46%. RWMA c/w RV pacing. Nondiagnostic EKG on pharm stress test.      Cardiovascular lower extremity venous duplex 09/04/2012    Acute, non-occlusive DVT in CFV on right. No DVT on left. No superficial thrombosis bilaterally.  Cardiovascular upper extremity venous duplex 08/27/2012    DVT in axillary vein on left. Left subclavian was not visualized.     Chronic anemia 9/5/2012    Chronic systolic heart failure (HCC)     Decreased calculated glomerular filtration rate (GFR) 3/30/2017    Calculated GFR equivalent to that of CKD stage 3 = 30-59 ml/min    Diabetic neuropathy associated with type 2 diabetes mellitus ([facility] 6/28/2011    Difficult airway for intubation 08/22/2012    see anesthesia airway note    Dyslipidemia 6/28/2011    Gout     History of complete heart block 6/28/2011    History of Coumadin therapy     Anticoagulation for DVT of the LUE; Discontinued on 3/30/2017    History of deep venous thrombosis 9/5/2012    Left upper extremity    History of pyelonephritis 3/30/2017    Left bundle branch block (LBBB) on electrocardiogram 6/28/2011    Nonischemic cardiomyopathy ([facility] 6/28/2011    Obesity (BMI 35.0-39.9 without comorbidity) ([facility] 3/13/2017    Obstructive sleep apnea on CPAP 2/7/2012    Psoas abscess, right ([facility] 4/20/2017    Psoas hematoma, right, secondary to anticoagulant therapy 3/30/2017    Type 2 diabetes mellitus with diabetic neuropathy [facility] 6/28/2011       Education Needs:        [x] None identified  [] Identified - Not appropriate at this time  []  Identified and addressed - refer to education log  Learning Limitations:   [x] None identified  [] Identified    Cultural, Taoist & ethnic food preferences:  [x] None identified    [] Identified and addressed     ESTIMATED NUTRITION NEEDS:     Calories: 3466-6823 kcal (MSJx1.2-1.3) based on  [x] Actual BW: 111 kg      [] IBW   CHO: 252-273 gm (50% kcal)   Protein:  gm (0.8-1 gm/kg) based on  [x] Actual BW      [] IBW   Fluid: 1 mL/kcal     MONITORING & EVALUATION:     Nutrition Goal(s):   1. Po intake of meals will meet >75% of patient estimated nutritional needs within the next 7 days.   Outcome:  [] Met/Ongoing    [x]  Not Met: progressing    [] New/Initial Goal     Monitoring:   [x] Diet tolerance   [x] Meal intake   [x] Supplement intake   [] GI symptoms/ability to tolerate po diet   [] Respiratory status   [] Plan of care      Previous Recommendations (for follow-up assessments only):     [x]   Implemented       []   Not Implemented (RD to address)     [] No Recommendation Made     Discharge Planning: diabetic, cardiac diet  [x] Participated in care planning, discharge planning, & interdisciplinary rounds as appropriate      Laci Tyler, [address] [place] [address]   Pager: [phone]

## 2017-05-04 NOTE — PROGRESS NOTES
[facility] - Progress Note  Admit Date: 4/20/2017     The patient was seen, examined, and independently evaluated and I agree with the below assessment and plan by Katey Rowe PA-C with the following comments. She has had a significant blood loss in past 3 days with Hct dropping from 24.6 on 5/1/2017 down to 20.4 today. Will be repeated per Hospitalist service and if truly that low they will transfuse. Remains stable from CV vantage at this time. Assessment:     Hospital Problems  Date Reviewed: 4/20/2017          Codes Class Noted POA    * (Principal)Acute paraplegia ([facility] [address] ICD-10-CM: G82.20  ICD-9-CM: 344.1  4/20/2017 Yes        Sepsis ([facility] [address] ICD-10-CM: A41.9  ICD-9-CM: 038.9, 995.91  4/20/2017 Yes        Psoas abscess, right ([facility] [address] ICD-10-CM: H91.34  ICD-9-CM: 567.31  4/20/2017 Yes        Krueger catheter in place on admission ICD-10-CM: Z96.0  ICD-9-CM: V45.89  4/20/2017 Yes        Urinary tract infection due to Enterococcus ICD-10-CM: N39.0, B95.2  ICD-9-CM: 599.0, 041.04  4/20/2017 Yes        Group B streptococcal infection ICD-10-CM: A49.1  ICD-9-CM: 041.02  4/20/2017 Yes        History of Coumadin therapy ICD-10-CM: Z79.01  ICD-9-CM: V58.61  Unknown Yes    Overview Signed 4/6/2017 10:35 PM by Merline Fernandez MD     Anticoagulation for chronic atrial fibrillation; Discontinued on 3/30/2017             Decreased calculated glomerular filtration rate (GFR) ICD-10-CM: R94.4  ICD-9-CM: 794.4  3/30/2017 Yes    Overview Signed 4/6/2017  5:47 PM by Merline Fernandez MD     Calculated GFR equivalent to that of CKD stage 3 = 30-59 ml/min             Iliopsoas muscle hematoma ICD-10-CM: S70.10XA  ICD-9-CM: 924.00  3/30/2017 Yes        Psoas hematoma, right, secondary to anticoagulant therapy ICD-10-CM: S30. 1XXA  ICD-9-CM: 924.9, E934.2  3/30/2017 Yes        Impaired mobility and ADLs ICD-10-CM: Z74.09  ICD-9-CM: 799.89  3/30/2017 Yes        Benign hypertensive heart disease with systolic CHF, NYHA class 2 ([facility] [address] (Chronic) ICD-10-CM: I11.0, I50.20  ICD-9-CM: 402.11, 428.20, 428.0  9/5/2012 Yes        AICD generator infection ([facility] [address] ICD-10-CM: T82. 7XXA  ICD-9-CM: 996.61  8/20/2012 Yes    Overview Signed 8/20/2012  6:13 PM by Domenic Nino MD     S/p explant 4 leads 8/20/12             Type 2 diabetes mellitus with diabetic neuropathy (HCC) (Chronic) ICD-10-CM: E11.40  ICD-9-CM: 250.60, 357.2  6/28/2011 Yes                 -Pacemaker pocket pain, cannot exclude seeding from abscess. Limited options given device dependence and obstruction. Treating conservatively. -Sepsis secondary to infected right psoas hematoma/epidural abscess with neurological compromise resulting in paraplegia. Now s/p drain by IR.  -Heart block s/p AICD 2005 with upgrade to BiV later that year, removed however in 2012 due to trauma and infection. New biventricular pacemaker LUQ 9/2012 by Dr. 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 Normal function 3/2017. Patient does report discomfort left upper quadrant following fall last month. Right axillary vein obstruction by venogram 2012.   -Elevated troponin likely demand ischemia in setting of sepsis. -H/p transient nonischemic CMY with EF 45% (45-50% 6/2015 with patent coronaries on cath 1996). No ischemia on nuclear 6/2015.  -Acute kidney injury, suspect multifactorial.  -Acute on chronic anemia. -Diabetes mellitus.  -Dyslipidemia. -H/o HTN. -LBBB. -HALI on CPAP. -H/o DVT.      Plan:     Noted significant anemia of 6.7 this AM, will benefit from transfusion. Still with intermittent fever but normal WBC. Still with abdominal pain but improved overall. Continue abx as outlined by ID and follow clinically. Renal function with some improvement, making urine, appreciate ongoing nephrology evaluation. Subjective:     Still with abdominal pain but overall improved. Remains paraplegic but intermittent sensation on lower extremities.     Objective:      Patient Vitals for the past 8 hrs:   Temp Pulse Resp BP SpO2   05/04/17 1047 98.6 °F (37 °C) 75 18 122/64 96 %   05/04/17 0812 98.5 °F (36.9 °C) 84 18 130/70 94 %         Patient Vitals for the past 96 hrs:   Weight   05/04/17 0508 109 kg (240 lb 4.8 oz)   05/02/17 0431 112 kg (246 lb 14.4 oz)   05/01/17 0736 111.3 kg (245 lb 6 oz)                    Intake/Output Summary (Last 24 hours) at 05/04/17 1316  Last data filed at 05/04/17 1045   Gross per 24 hour   Intake                0 ml   Output                0 ml   Net                0 ml       Physical Exam:  General:  alert, cooperative, no distress, appears stated age  Neck:  no JVD  Lungs:  clear to auscultation bilaterally  Heart:  regular rate and rhythm  Abdomen:  abdomen is soft without significant tenderness, masses, organomegaly or guarding  Extremities:  extremities normal, atraumatic, no cyanosis 1+ edema    Data Review:     Labs: Results:       Chemistry Recent Labs      05/04/17   0415  05/03/17   1240  05/02/17   1500   GLU  71*  146*  90   NA  136  135*  134*   K  3.8  4.0  4.5   CL  103  99*  99*   CO2  25  27  28   BUN  32*  33*  37*   CREA  3.05*  3.17*  3.40*   CA  7.8*  7.8*  7.6*   AGAP  8  9  7   BUCR  10*  10*  11*      CBC w/Diff Recent Labs      05/04/17   0415   WBC  5.6   RBC  2.62*   HGB  6.7*   HCT  20.4*   PLT  210   GRANS  68   LYMPH  22   EOS  2      Cardiac Enzymes No results found for: CPK, CKMMB, CKMB, RCK3, CKMBT, CKNDX, CKND1, CARLI, TROPT, TROIQ, AMBERLY, TROPT, TNIPOC, BNP, BNPP   Coagulation No results for input(s): PTP, INR, APTT in the last 72 hours.     No lab exists for component: INREXT, INREXT    Lipid Panel Lab Results   Component Value Date/Time    Cholesterol, total 154 07/24/2012 01:00 AM    HDL Cholesterol 48 07/24/2012 01:00 AM    LDL, calculated 90.4 07/24/2012 01:00 AM    VLDL, calculated 15.6 07/24/2012 01:00 AM    Triglyceride 78 07/24/2012 01:00 AM    CHOL/HDL Ratio 3.2 07/24/2012 01:00 AM      BNP Lab Results   Component Value Date/Time    B-type Natriuretic Peptide 111.9 11/19/2013 10:17 AM      Liver Enzymes No results for input(s): TP, ALB, TBIL, AP, SGOT, GPT in the last 72 hours.     No lab exists for component: DBIL   Digoxin    Thyroid Studies Lab Results   Component Value Date/Time    TSH 0.51 04/20/2017 07:26 PM          Signed By: Elva Marie DO     May 4, 2017

## 2017-05-04 NOTE — PROGRESS NOTES
Infectious Disease Progress Note    Requested by: Dr. Aristides Sheldon, dr. Kristina Meadows    Reason: sepsis, acute paraplegia    Current abx Prior abx   Ceftriaxone since 4/24 Cefepime 4/20-4/21  vancomycin  4/20-4/24  Gentamicin 4/21-4/24  Metronidazole  4/20-4/24     Lines:   PICC RUE 4/26    Assessment :    77 y.o., right handed female, with an established history of hypertension, complete heart block with permanent pacemaker placed, diabetes mellitus, diabetic peripheral neuropathy, obesity, admitted to [facility] on 4/20/17. Clinical presentation consistent with  Sepsis (POA)  secondary to group B streptococcus bloodstream infection (positive blood cultures 4/20, negative blood cultures 4/21). Most likely source of bloodstream infection is infected right psoas hematoma/abscess. 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 No signs of neurological recovery on today's exam     2 week h/o pain at the site of abdominal pacemaker, tenderness at the site - however, patient doesn't have erythema at the site of pacemaker. No fluid collection noted at pacemaker pocket site per usg 4/24. Quick clearance of bacteremia would argue against endocarditis/endovascular infection. At this time risk of removal of pacemaker/general pocket change exceed the benefit. Discussed with cardiology. Would recommend close monitoring. Abdominal usg 4/24 doesn't reveal evidence of abscess/fluid collection at the site of pacemaker pocket. Enterococcus in urine cultures 4/20 likely colonizer    Acute renal failure: nephrology consult appreciated. Difficult to determine exact etiology of ARF at this time. Slight improvement in creatinine today. Low grade fever overnight - no clinical evidence of new infection at this time. Will monitor for nosocomial infections. Still with purulent drainage right flank drain. 150cc output yesterday. Unfortunately unable to r/o loculated abscess in absence of contrast. No significant pyuria noted argues against cystitis. Increased right flank tenderness/pain - rule out worsening right psoas abscess    Recommendations:    1. cont ceftriaxone till 6/4/17  2. Obtain CXR to r/o hcap (clinical suspicion low)  3. Obtain ct abdomen/pelvis to evaluate psoas abscess  5. F/u cbc, temp, clinically    Above plan was discussed in details with patient . Please call me if any further questions or concerns. Will continue to participate in the care of this patient. subjective:    Complains of increased right flank pain, lower abdominal pain when she moves. Denies chills. Denies cp, sob. Unable to move legs. decreased pain at the site of pacemaker left abdomen. No nausea, vomiting. No new rash/itching/joint pain/back pain. Home Medication List    Details   gabapentin (NEURONTIN) 400 mg capsule Take 1 Cap by mouth two (2) times a day. Indications: NEUROPATHIC PAIN  Qty: 30 Cap, Refills: 0    Associated Diagnoses: Iliopsoas muscle hematoma, right, subsequent encounter      cholecalciferol (VITAMIN D3) 1,000 unit tablet Take 2 Tabs by mouth daily. Indications: PREVENTION OF VITAMIN D DEFICIENCY  Qty: 30 Tab, Refills: 0      SITagliptin (JANUVIA) 50 mg tablet Take 50 mg by mouth daily. Indications: type 2 diabetes mellitus      potassium chloride (KLOR-CON M20) 20 mEq tablet Take 20 mEq by mouth two (2) times a day. Indications: HYPOKALEMIA PREVENTION      ondansetron (ZOFRAN ODT) 4 mg disintegrating tablet Take 4 mg by mouth every eight (8) hours as needed for Nausea. cyclobenzaprine (FLEXERIL) 10 mg tablet Take 10 mg by mouth as needed for Muscle Spasm(s). Indications: MUSCLE SPASM      carvedilol (COREG) 6.25 mg tablet Take 1 Tab by mouth two (2) times daily (with meals).  Indications: hypertension  Qty: 30 Tab, Refills: 0    Associated Diagnoses: Benign hypertensive heart disease with systolic CHF, NYHA class 2 (HCC)      acetaminophen (TYLENOL) 325 mg tablet Take 2 Tabs by mouth every four (4) hours as needed (for fever or pain level less than 5/10). Indications: Fever, Pain  Qty: 30 Tab, Refills: 0    Associated Diagnoses: Iliopsoas muscle hematoma, right, subsequent encounter      allopurinol (ZYLOPRIM) 100 mg tablet Take 1 Tab by mouth daily. Indications: GOUT  Qty: 15 Tab, Refills: 0    Associated Diagnoses: Chronic gout, unspecified cause, unspecified site      insulin glargine (LANTUS) 100 unit/mL injection 15 Units by SubCUTAneous route nightly. Indications: type 2 diabetes mellitus  Qty: 1 Vial, Refills: 0    Associated Diagnoses: Type 2 diabetes mellitus with diabetic neuropathy, with long-term current use of insulin ([facility])      docusate sodium (COLACE) 100 mg capsule Take 1 Cap by mouth daily (after breakfast). Indications: Constipation  Qty: 15 Cap, Refills: 0      senna-docusate (PERICOLACE) 8.6-50 mg per tablet Take 2 Tabs by mouth daily (after dinner). Indications: Constipation  Qty: 30 Tab, Refills: 0      oxyCODONE-acetaminophen (PERCOCET 7.5) 7.5-325 mg per tablet Take 1 Tab by mouth every four (4) hours as needed (for pain level greater than 4/10). Max Daily Amount: 6 Tabs. Indications: Pain  Qty: 50 Tab, Refills: 0    Associated Diagnoses: Iliopsoas muscle hematoma, right, subsequent encounter      bumetanide (BUMEX) 1 mg tablet TAKE 1 TABLET TWICE A DAY  Qty: 180 Tab, Refills: 1      pravastatin (PRAVACHOL) 40 mg tablet Take 40 mg by mouth nightly. Indications: DYSLIPIDEMIA      ferrous sulfate 325 mg (65 mg iron) tablet Take 1 Tab by mouth two (2) times daily (with meals).   Qty: 30 Tab, Refills: 0      insulin aspart (NOVOLOG) 100 unit/mL injection INITIATE INSULIN CORRECTIVE PROTOCOL (HR): Normal Insulin Sensitivity  For Blood Sugar (mg/dL) of:    Less than 150 =   0 units          150 -199 =   2 units 200 -249 =   4 units 250 -299 =   6 units 300 -349 =   8 units 350 and above =   10 units If 2 glucose readings are above 200 mg/dL  Qty: 10 mL, Refills: 6             Current Facility-Administered Medications   Medication Dose Route Frequency    famotidine (PEPCID) tablet 20 mg  20 mg Oral DAILY    0.9% sodium chloride infusion  25 mL/hr IntraVENous CONTINUOUS    insulin lispro (HUMALOG) injection   SubCUTAneous AC&HS    0.9% sodium chloride infusion 250 mL  250 mL IntraVENous PRN    cefTRIAXone (ROCEPHIN) 2 g in 0.9% sodium chloride (MBP/ADV) 50 mL MBP  2 g IntraVENous Q24H    insulin glargine (LANTUS) injection 20 Units  20 Units SubCUTAneous DAILY    diphenhydrAMINE (BENADRYL) capsule 50 mg  50 mg Oral Q4H PRN    polyethylene glycol (MIRALAX) packet 17 g  17 g Oral TID    cholecalciferol (VITAMIN D3) tablet 2,000 Units  2,000 Units Oral DAILY    docusate sodium (COLACE) capsule 100 mg  100 mg Oral DAILY AFTER BREAKFAST    gabapentin (NEURONTIN) capsule 400 mg  400 mg Oral BID    oxyCODONE-acetaminophen (PERCOCET 7.5) 7.5-325 mg per tablet 1 Tab  1 Tab Oral Q4H PRN    pravastatin (PRAVACHOL) tablet 40 mg  40 mg Oral QHS    senna-docusate (PERICOLACE) 8.6-50 mg per tablet 2 Tab  2 Tab Oral PCD    acetaminophen (TYLENOL) tablet 500 mg  500 mg Oral Q6H PRN    allopurinol (ZYLOPRIM) tablet 100 mg  100 mg Oral DAILY    clotrimazole (MYCELEX) frances 10 mg  10 mg Oral 5XD    folic acid (FOLVITE) tablet 1 mg  1 mg Oral DAILY    ondansetron (ZOFRAN) injection 4 mg  4 mg IntraVENous Q8H PRN    glucose chewable tablet 16 g  16 g Oral PRN    glucagon (GLUCAGEN) injection 1 mg  1 mg IntraMUSCular PRN    dextrose (D50W) injection syrg 12.5-25 g  25-50 mL IntraVENous PRN       Allergies: Vancomycin; Ampicillin; Bactrim [sulfamethoxazole-trimethoprim]; Blueberry; Ciprofloxacin; Codeine; Crestor [rosuvastatin]; Darvocet a500 [propoxyphene n-acetaminophen]; Demerol [meperidine]; Levaquin [levofloxacin]; Lipitor [atorvastatin]; Magnesium oxide; Minocin [minocycline];  Pcn [penicillins]; Pravachol [pravastatin]; Sulfa (sulfonamide antibiotics); Ultracet [tramadol-acetaminophen]; Vicodin [hydrocodone-acetaminophen]; Vytorin 10-10 [ezetimibe-simvastatin]; and Percodan [oxycodone hcl-oxycodone-asa]    Temp (24hrs), Av.3 °F (37.4 °C), Min:98.3 °F (36.8 °C), Max:100.9 °F (38.3 °C)    Visit Vitals    /70 (BP 1 Location: Left arm, BP Patient Position: At rest)    Pulse 84    Temp 98.5 °F (36.9 °C)    Resp 18    Ht 5' 7\" (1.702 m)    Wt 109 kg (240 lb 4.8 oz)    SpO2 94%    Breastfeeding No    BMI 37.64 kg/m2       ROS: patient unable to communicate fluently. Detailed ros not feasible. Physical Exam:    General: Well developed, well nourished female laying on the bed AAOx3 NAD    General:   awake alert and oriented   HEENT:  Normocephalic, atraumatic, PERRL, EOMI, no scleral icterus or pallor; no conjunctival hemmohage;  nasal and oral mucous are moist and without evidence of lesions. Neck supple, no bruits. Lymph Nodes:   no cervical, axillary or inguinal adenopathy   Lungs:   non-labored, bilaterally clear to auscultation- no crackles wheezes rales or rhonchi   Heart:   s1 and s2 irregular; no rubs or gallops, no edema, + pedal pulses   Abdomen:  soft, non-distended, active bowel sounds, no hepatomegaly, no splenomegaly. no tenderness over the left abdominal pacemaker, no overlying erythema or fluctuance   Genitourinary:  deferred   Extremities:   no clubbing, cyanosis; no joint effusions or swelling; muscle mass appropriate for age   Neurologic:  No gross focal sensory abnormalities; 5/5 muscle strength to upper extremities. 0/5 strength in lower extremities.   Cranial nerves intact                        Skin:  Surgical scars abdomen, left neck well healed   Back:  right flank drain with purulent drainage,  no paraspinal muscle guarding or rigidity, right CVA tenderness     Psychiatric:  No suicidal or homicidal ideations, appropriate mood and affect         Labs: Results:   Chemistry Recent Labs      05/04/17   0415  05/03/17   1240  05/02/17   1500   GLU  71*  146*  90   NA  136  135*  134*   K  3.8  4.0  4.5   CL  103  99*  99*   CO2  25  27  28   BUN  32*  33*  37*   CREA  3.05*  3.17*  3.40*   CA  7.8*  7.8*  7.6*   AGAP  8  9  7   BUCR  10*  10*  11*      CBC w/Diff Recent Labs      05/04/17   0415   WBC  5.6   RBC  2.62*   HGB  6.7*   HCT  20.4*   PLT  210   GRANS  68   LYMPH  22   EOS  2      Microbiology No results for input(s): CULT in the last 72 hours.        RADIOLOGY:    All available imaging studies/reports in [facility] care for this admission were reviewed    Dr. Valentina Murillo, Infectious Disease Specialist  [phone]  May 4, 2017  3:49 PM

## 2017-05-04 NOTE — ROUTINE PROCESS
Bedside shift change report given to Taylor Madden (oncoming nurse) by Zachary Perez (offgoing nurse). Report included the following information SBAR, Intake/Output, MAR and Cardiac Rhythm . Ariadna Garcia

## 2017-05-04 NOTE — PROGRESS NOTES
[facility]  Progress Note    Patient: Benjamin Show Age: 77 y.o. : 1950 MR#: [id number] SSN: [id number]  Date/Time: 2017     Subjective:     Feeling weak   No sensations / to minimal sensations in legs   No h/o bleed   No NVD     Assessment/Plan:    77 yr old Female with PMH of iliopsoas hematoma , patient developed sudden onset of Right  leg weakness , patient evaluated by rehab MD and was transferred to medical service for further management . Dr Stacie Bobby - spine surgery consulted , neurology consulted , patient is admitted to ICU        1 Right Leg weakness   - Infected Psoas  hematoma s/p drain , IR to reevaluate   - continue IV antibiotics -ceftriaxone till 2017  - Follow with ID     2 CMO - LBBB, - followed by cardiology   - H/O PACE Maker placement for CHB   - no evidence of pacemaker pocket infection       3 DM2  - no hypoglycemia noted   - Continue current management     4 H/O DVT off  AC - due to Bleed     5 Elevated trop - resolved     6 Acute blood loss anemia   - stable       7 UTI- POA   - Continue current antibiotics   -  urine culture - E Fecalis     8 LISBETH   - some improvement in renal function - continue current treatments   - follow renal duplex   - improvement after IV hydration - ? all related to pre renal azo.  From dehydration     8 Anemia acute blood loss   - Recheck hematoma site - CT abd   - RPT - CBC - unexpected low H/H   - BT if rpt H/H is low   - D/W nursing     D/w patient   On discharge they would like to go home       Case discussed with:  [x]Patient  [x]Family  [x]Nursing  []Case Management  DVT Prophylaxis:  []Lovenox  []Hep SQ  [x]SCDs  []Coumadin   []On Heparin gtt    Patient Active Problem List   Diagnosis Code    Nonischemic cardiomyopathy ([facility] I42.8    History of complete heart block Z86.79    Biventricular implantable cardioverter-defibrillator in situ Z95.810    Left bundle branch block (LBBB) on electrocardiogram I44.7    Type 2 diabetes mellitus with diabetic neuropathy (HCC) E11.40    Dyslipidemia E78.5    Diabetic neuropathy associated with type 2 diabetes mellitus ([facility] [facility] E11.40    Obstructive sleep apnea on CPAP G47.33, Z99.89    AICD generator infection ([facility] [address] T82. 7XXA    Difficult airway for intubation T88. 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 1XXA    Impaired mobility and ADLs Z74.09    History of Coumadin therapy Z79.01    Gout M10.9    Acute paraplegia (HCC) G82.20    Sepsis ([facility] A41.9    Psoas abscess, right ([facility] K68.12    Krueger catheter in place on admission Z96.0    Urinary tract infection due to Enterococcus N39.0, B95.2    Group B streptococcal infection A49.1       Objective:   VS:   Visit Vitals    /64 (BP 1 Location: Left arm, BP Patient Position: At rest)    Pulse 75    Temp 98.6 °F (37 °C)    Resp 18    Ht 5' 7\" (1.702 m)    Wt 109 kg (240 lb 4.8 oz)    SpO2 96%    Breastfeeding No    BMI 37.64 kg/m2      Tmax/24hrs: Temp (24hrs), Av.3 °F (37.4 °C), Min:98.5 °F (36.9 °C), Max:100.9 °F (38.3 °C)  IOBRIEF    Intake/Output Summary (Last 24 hours) at 17 1319  Last data filed at 17 1045   Gross per 24 hour   Intake                0 ml   Output                0 ml   Net                0 ml       General:  Appears in better mood and comfortable   HEENT:  NC, Atraumatic. PERRLA, anicteric sclerae. Pulmonary:  CTA Bilaterally.  No Wheezing/Rhonchi/Rales. Cardiovascular: Regular rate and Rhythm. GI:  Soft, Non distended, Non tender. + Bowel sounds. Extremities:  No edema, cyanosis, clubbing. No calf tenderness. Psych:  Not anxious or agitated. Neurologic: Grossly - Motor and Sensory functions are intact .  No Anxiety , calm , no Agitation         Medications:   Current Facility-Administered Medications   Medication Dose Route Frequency    0.9% sodium chloride infusion 250 mL  250 mL IntraVENous PRN    famotidine (PEPCID) tablet 20 mg  20 mg Oral DAILY    0.9% sodium chloride infusion  25 mL/hr IntraVENous CONTINUOUS    insulin lispro (HUMALOG) injection   SubCUTAneous AC&HS    0.9% sodium chloride infusion 250 mL  250 mL IntraVENous PRN    cefTRIAXone (ROCEPHIN) 2 g in 0.9% sodium chloride (MBP/ADV) 50 mL MBP  2 g IntraVENous Q24H    insulin glargine (LANTUS) injection 20 Units  20 Units SubCUTAneous DAILY    diphenhydrAMINE (BENADRYL) capsule 50 mg  50 mg Oral Q4H PRN    polyethylene glycol (MIRALAX) packet 17 g  17 g Oral TID    cholecalciferol (VITAMIN D3) tablet 2,000 Units  2,000 Units Oral DAILY    docusate sodium (COLACE) capsule 100 mg  100 mg Oral DAILY AFTER BREAKFAST    gabapentin (NEURONTIN) capsule 400 mg  400 mg Oral BID    oxyCODONE-acetaminophen (PERCOCET 7.5) 7.5-325 mg per tablet 1 Tab  1 Tab Oral Q4H PRN    pravastatin (PRAVACHOL) tablet 40 mg  40 mg Oral QHS    senna-docusate (PERICOLACE) 8.6-50 mg per tablet 2 Tab  2 Tab Oral PCD    acetaminophen (TYLENOL) tablet 500 mg  500 mg Oral Q6H PRN    allopurinol (ZYLOPRIM) tablet 100 mg  100 mg Oral DAILY    clotrimazole (MYCELEX) frances 10 mg  10 mg Oral 5XD    folic acid (FOLVITE) tablet 1 mg  1 mg Oral DAILY    ondansetron (ZOFRAN) injection 4 mg  4 mg IntraVENous Q8H PRN    glucose chewable tablet 16 g  16 g Oral PRN    glucagon (GLUCAGEN) injection 1 mg  1 mg IntraMUSCular PRN    dextrose (D50W) injection syrg 12.5-25 g  25-50 mL IntraVENous PRN Labs:    Recent Results (from the past 24 hour(s))   GLUCOSE, POC    Collection Time: 05/03/17  4:37 PM   Result Value Ref Range    Glucose (POC) 196 (H) 70 - 110 mg/dL   GLUCOSE, POC    Collection Time: 05/03/17  9:14 PM   Result Value Ref Range    Glucose (POC) 212 (H) 70 - 492 mg/dL   METABOLIC PANEL, BASIC    Collection Time: 05/04/17  4:15 AM   Result Value Ref Range    Sodium 136 136 - 145 mmol/L    Potassium 3.8 3.5 - 5.5 mmol/L    Chloride 103 100 - 108 mmol/L    CO2 25 21 - 32 mmol/L    Anion gap 8 3.0 - 18 mmol/L    Glucose 71 (L) 74 - 99 mg/dL    BUN 32 (H) 7.0 - 18 MG/DL    Creatinine 3.05 (H) 0.6 - 1.3 MG/DL    BUN/Creatinine ratio 10 (L) 12 - 20      GFR est AA 19 (L) >60 ml/min/1.73m2    GFR est non-AA 15 (L) >60 ml/min/1.73m2    Calcium 7.8 (L) 8.5 - 10.1 MG/DL   CBC WITH AUTOMATED DIFF    Collection Time: 05/04/17  4:15 AM   Result Value Ref Range    WBC 5.6 4.6 - 13.2 K/uL    RBC 2.62 (L) 4.20 - 5.30 M/uL    HGB 6.7 (L) 12.0 - 16.0 g/dL    HCT 20.4 (L) 35.0 - 45.0 %    MCV 77.9 74.0 - 97.0 FL    MCH 25.6 24.0 - 34.0 PG    MCHC 32.8 31.0 - 37.0 g/dL    RDW 16.2 (H) 11.6 - 14.5 %    PLATELET 926 958 - 958 K/uL    MPV 9.5 9.2 - 11.8 FL    NEUTROPHILS 68 40 - 73 %    LYMPHOCYTES 22 21 - 52 %    MONOCYTES 7 3 - 10 %    EOSINOPHILS 2 0 - 5 %    BASOPHILS 1 0 - 2 %    ABS. NEUTROPHILS 3.8 1.8 - 8.0 K/UL    ABS. LYMPHOCYTES 1.2 0.9 - 3.6 K/UL    ABS. MONOCYTES 0.4 0.05 - 1.2 K/UL    ABS. EOSINOPHILS 0.1 0.0 - 0.4 K/UL    ABS.  BASOPHILS 0.0 0.0 - 0.1 K/UL    DF AUTOMATED     GLUCOSE, POC    Collection Time: 05/04/17  6:31 AM   Result Value Ref Range    Glucose (POC) 81 70 - 110 mg/dL   GLUCOSE, POC    Collection Time: 05/04/17 10:53 AM   Result Value Ref Range    Glucose (POC) 92 70 - 110 mg/dL       Signed By: Antonio Ryan MD     May 4, 2017

## 2017-05-05 LAB
ANION GAP BLD CALC-SCNC: 7 MMOL/L (ref 3–18)
BASOPHILS # BLD AUTO: 0 K/UL (ref 0–0.1)
BASOPHILS # BLD: 0 % (ref 0–2)
BUN SERPL-MCNC: 31 MG/DL (ref 7–18)
BUN/CREAT SERPL: 11 (ref 12–20)
CALCIUM SERPL-MCNC: 8.1 MG/DL (ref 8.5–10.1)
CHLORIDE SERPL-SCNC: 104 MMOL/L (ref 100–108)
CO2 SERPL-SCNC: 25 MMOL/L (ref 21–32)
CREAT SERPL-MCNC: 2.8 MG/DL (ref 0.6–1.3)
DIFFERENTIAL METHOD BLD: ABNORMAL
EOSINOPHIL # BLD: 0.2 K/UL (ref 0–0.4)
EOSINOPHIL NFR BLD: 3 % (ref 0–5)
ERYTHROCYTE [DISTWIDTH] IN BLOOD BY AUTOMATED COUNT: 16.4 % (ref 11.6–14.5)
GLUCOSE BLD STRIP.AUTO-MCNC: 113 MG/DL (ref 70–110)
GLUCOSE BLD STRIP.AUTO-MCNC: 137 MG/DL (ref 70–110)
GLUCOSE BLD STRIP.AUTO-MCNC: 146 MG/DL (ref 70–110)
GLUCOSE BLD STRIP.AUTO-MCNC: 209 MG/DL (ref 70–110)
GLUCOSE BLD STRIP.AUTO-MCNC: 84 MG/DL (ref 70–110)
GLUCOSE BLD STRIP.AUTO-MCNC: 85 MG/DL (ref 70–110)
GLUCOSE SERPL-MCNC: 79 MG/DL (ref 74–99)
HCT VFR BLD AUTO: 19.7 % (ref 35–45)
HCT VFR BLD AUTO: 22.4 % (ref 35–45)
HGB BLD-MCNC: 6.5 G/DL (ref 12–16)
HGB BLD-MCNC: 7.4 G/DL (ref 12–16)
LYMPHOCYTES # BLD AUTO: 23 % (ref 21–52)
LYMPHOCYTES # BLD: 1.3 K/UL (ref 0.9–3.6)
MCH RBC QN AUTO: 25.7 PG (ref 24–34)
MCHC RBC AUTO-ENTMCNC: 33 G/DL (ref 31–37)
MCV RBC AUTO: 77.9 FL (ref 74–97)
MONOCYTES # BLD: 0.5 K/UL (ref 0.05–1.2)
MONOCYTES NFR BLD AUTO: 8 % (ref 3–10)
NEUTS SEG # BLD: 3.7 K/UL (ref 1.8–8)
NEUTS SEG NFR BLD AUTO: 66 % (ref 40–73)
PLATELET # BLD AUTO: 193 K/UL (ref 135–420)
PMV BLD AUTO: 8.9 FL (ref 9.2–11.8)
POTASSIUM SERPL-SCNC: 3.7 MMOL/L (ref 3.5–5.5)
RBC # BLD AUTO: 2.53 M/UL (ref 4.2–5.3)
SODIUM SERPL-SCNC: 136 MMOL/L (ref 136–145)
WBC # BLD AUTO: 5.7 K/UL (ref 4.6–13.2)

## 2017-05-05 PROCEDURE — 36415 COLL VENOUS BLD VENIPUNCTURE: CPT | Performed by: INTERNAL MEDICINE

## 2017-05-05 PROCEDURE — 65660000000 HC RM CCU STEPDOWN

## 2017-05-05 PROCEDURE — 80048 BASIC METABOLIC PNL TOTAL CA: CPT | Performed by: INTERNAL MEDICINE

## 2017-05-05 PROCEDURE — 82962 GLUCOSE BLOOD TEST: CPT

## 2017-05-05 PROCEDURE — 74011250637 HC RX REV CODE- 250/637: Performed by: INTERNAL MEDICINE

## 2017-05-05 PROCEDURE — 74011000258 HC RX REV CODE- 258: Performed by: INTERNAL MEDICINE

## 2017-05-05 PROCEDURE — 86900 BLOOD TYPING SEROLOGIC ABO: CPT | Performed by: INTERNAL MEDICINE

## 2017-05-05 PROCEDURE — 74011636637 HC RX REV CODE- 636/637: Performed by: HOSPITALIST

## 2017-05-05 PROCEDURE — 74011250636 HC RX REV CODE- 250/636: Performed by: INTERNAL MEDICINE

## 2017-05-05 PROCEDURE — 36430 TRANSFUSION BLD/BLD COMPNT: CPT

## 2017-05-05 PROCEDURE — 85025 COMPLETE CBC W/AUTO DIFF WBC: CPT | Performed by: INTERNAL MEDICINE

## 2017-05-05 PROCEDURE — 85018 HEMOGLOBIN: CPT | Performed by: INTERNAL MEDICINE

## 2017-05-05 PROCEDURE — 74011250637 HC RX REV CODE- 250/637: Performed by: HOSPITALIST

## 2017-05-05 PROCEDURE — 86920 COMPATIBILITY TEST SPIN: CPT | Performed by: INTERNAL MEDICINE

## 2017-05-05 PROCEDURE — 74011636637 HC RX REV CODE- 636/637: Performed by: INTERNAL MEDICINE

## 2017-05-05 PROCEDURE — P9016 RBC LEUKOCYTES REDUCED: HCPCS | Performed by: INTERNAL MEDICINE

## 2017-05-05 RX ORDER — SODIUM CHLORIDE 9 MG/ML
250 INJECTION, SOLUTION INTRAVENOUS AS NEEDED
Status: DISCONTINUED | OUTPATIENT
Start: 2017-05-05 | End: 2017-05-09

## 2017-05-05 RX ADMIN — CLOTRIMAZOLE 10 MG: 10 LOZENGE ORAL at 18:29

## 2017-05-05 RX ADMIN — GABAPENTIN 400 MG: 400 CAPSULE ORAL at 17:06

## 2017-05-05 RX ADMIN — CLOTRIMAZOLE 10 MG: 10 LOZENGE ORAL at 11:44

## 2017-05-05 RX ADMIN — INSULIN LISPRO 6 UNITS: 100 INJECTION, SOLUTION INTRAVENOUS; SUBCUTANEOUS at 22:57

## 2017-05-05 RX ADMIN — PRAVASTATIN SODIUM 40 MG: 20 TABLET ORAL at 22:56

## 2017-05-05 RX ADMIN — ALLOPURINOL 100 MG: 100 TABLET ORAL at 08:16

## 2017-05-05 RX ADMIN — CLOTRIMAZOLE 10 MG: 10 LOZENGE ORAL at 08:16

## 2017-05-05 RX ADMIN — CLOTRIMAZOLE 10 MG: 10 LOZENGE ORAL at 16:50

## 2017-05-05 RX ADMIN — INSULIN GLARGINE 20 UNITS: 100 INJECTION, SOLUTION SUBCUTANEOUS at 08:20

## 2017-05-05 RX ADMIN — OXYCODONE HYDROCHLORIDE AND ACETAMINOPHEN 1 TABLET: 7.5; 325 TABLET ORAL at 18:18

## 2017-05-05 RX ADMIN — CEFTRIAXONE SODIUM 2 G: 2 INJECTION, POWDER, FOR SOLUTION INTRAMUSCULAR; INTRAVENOUS at 11:40

## 2017-05-05 RX ADMIN — OXYCODONE HYDROCHLORIDE AND ACETAMINOPHEN 1 TABLET: 7.5; 325 TABLET ORAL at 06:37

## 2017-05-05 RX ADMIN — GABAPENTIN 400 MG: 400 CAPSULE ORAL at 08:16

## 2017-05-05 RX ADMIN — FAMOTIDINE 20 MG: 20 TABLET ORAL at 08:16

## 2017-05-05 RX ADMIN — CHOLECALCIFEROL TAB 25 MCG (1000 UNIT) 2000 UNITS: 25 TAB at 08:16

## 2017-05-05 RX ADMIN — FOLIC ACID 1 MG: 1 TABLET ORAL at 08:16

## 2017-05-05 RX ADMIN — CLOTRIMAZOLE 10 MG: 10 LOZENGE ORAL at 22:56

## 2017-05-05 NOTE — ROUTINE PROCESS
Bedside shift change report given to Reta España (oncoming nurse) by Matt Haywood (offgoing nurse). Report included the following information SBAR, Intake/Output, MAR, Recent Results and Cardiac Rhythm . Erlin Meza

## 2017-05-05 NOTE — PROGRESS NOTES
[facility] - Progress Note  Admit Date: 4/20/2017    Assessment:     Hospital Problems  Date Reviewed: 4/20/2017          Codes Class Noted POA    * (Principal)Acute paraplegia ([facility] [address] ICD-10-CM: G82.20  ICD-9-CM: 344.1  4/20/2017 Yes        Sepsis ([facility] [address] ICD-10-CM: A41.9  ICD-9-CM: 038.9, 995.91  4/20/2017 Yes        Psoas abscess, right ([facility] [address] ICD-10-CM: P20.34  ICD-9-CM: 567.31  4/20/2017 Yes        Krueger catheter in place on admission ICD-10-CM: Z96.0  ICD-9-CM: V45.89  4/20/2017 Yes        Urinary tract infection due to Enterococcus ICD-10-CM: N39.0, B95.2  ICD-9-CM: 599.0, 041.04  4/20/2017 Yes        Group B streptococcal infection ICD-10-CM: A49.1  ICD-9-CM: 041.02  4/20/2017 Yes        History of Coumadin therapy ICD-10-CM: Z79.01  ICD-9-CM: V58.61  Unknown Yes    Overview Signed 4/6/2017 10:35 PM by Bo Bravo MD     Anticoagulation for chronic atrial fibrillation; Discontinued on 3/30/2017             Decreased calculated glomerular filtration rate (GFR) ICD-10-CM: R94.4  ICD-9-CM: 794.4  3/30/2017 Yes    Overview Signed 4/6/2017  5:47 PM by Bo Bravo MD     Calculated GFR equivalent to that of CKD stage 3 = 30-59 ml/min             Iliopsoas muscle hematoma ICD-10-CM: S70.10XA  ICD-9-CM: 924.00  3/30/2017 Yes        Psoas hematoma, right, secondary to anticoagulant therapy ICD-10-CM: S30. 1XXA  ICD-9-CM: 924.9, E934.2  3/30/2017 Yes        Impaired mobility and ADLs ICD-10-CM: Z74.09  ICD-9-CM: 799.89  3/30/2017 Yes        Benign hypertensive heart disease with systolic CHF, NYHA class 2 (HCC) (Chronic) ICD-10-CM: I11.0, I50.20  ICD-9-CM: 402.11, 428.20, 428.0  9/5/2012 Yes        [facility]ICD generator infection ([facility] ICD-10-CM: T82. 7XXA  ICD-9-CM: 996.61  8/20/2012 Yes    Overview Signed 8/20/2012  6:13 PM by Yoli Weir MD     S/p explant 4 leads 8/20/12             Type 2 diabetes mellitus with diabetic neuropathy (HCC) (Chronic) ICD-10-CM: E11.40  ICD-9-CM: 250.60, 357.2  6/28/2011 Yes              -Pacemaker pocket pain, cannot exclude seeding from abscess. Limited options given device dependence and obstruction. Treating conservatively. -Sepsis secondary to infected right psoas hematoma/epidural abscess with neurological compromise resulting in paraplegia. Now s/p drain by IR.  -Heart block s/p AICD 2005 with upgrade to BiV later that year, removed however in 2012 due to trauma and infection. New biventricular pacemaker LUQ 9/2012 by Dr. Rito Sotelo and revision 10/2012 due to Twiddler's syndrome. Pacemaker dependent. Normal function 3/2017. Patient does report discomfort left upper quadrant following fall last month. Right axillary vein obstruction by venogram 2012.   -Elevated troponin likely demand ischemia in setting of sepsis. -H/p transient nonischemic CMY with EF 45% (45-50% 6/2015 with patent coronaries on cath 1996). No ischemia on nuclear 6/2015.  -Acute kidney injury, suspect multifactorial.  -Acute on chronic anemia. -Diabetes mellitus.  -Dyslipidemia. -H/o HTN. -LBBB. -HALI on CPAP. -H/o DVT. Plan:     Hgb remains low, plan for transfusion today, ?stool. Chills this AM with temp 99.4 at most overnight, WBC unremarkable. Continue abx, conservative therapy, following clinically. Renal function improving, making urine. Subjective:     Chills this AM. Still with abdominal pain but better.      Objective:      Patient Vitals for the past 8 hrs:   Temp Pulse Resp BP SpO2   05/05/17 0814 98.6 °F (37 °C) 70 18 104/58 94 %   05/05/17 0700 99.2 °F (37.3 °C) 91 17 99/61 -   05/05/17 0400 98 °F (36.7 °C) 73 18 108/61 94 %         Patient Vitals for the past 96 hrs:   Weight   05/05/17 0554 113.6 kg (250 lb 8 oz)   05/04/17 0508 109 kg (240 lb 4.8 oz)   05/02/17 0431 112 kg (246 lb 14.4 oz)                    Intake/Output Summary (Last 24 hours) at 05/05/17 1035  Last data filed at 05/05/17 0330   Gross per 24 hour   Intake              830 ml   Output             1725 ml   Net -895 ml       Physical Exam:  General:  alert, cooperative, no distress, appears stated age  Neck:  no JVD  Lungs:  clear to auscultation bilaterally  Heart:  regular rate and rhythm  Abdomen:  abdomen is soft without significant tenderness, masses, organomegaly or guarding  Extremities:  extremities normal, atraumatic, no cyanosis 1+ edema    Data Review:     Labs: Results:       Chemistry Recent Labs      05/05/17   0411  05/04/17   0415  05/03/17   1240   GLU  79  71*  146*   NA  136  136  135*   K  3.7  3.8  4.0   CL  104  103  99*   CO2  25  25  27   BUN  31*  32*  33*   CREA  2.80*  3.05*  3.17*   CA  8.1*  7.8*  7.8*   AGAP  7  8  9   BUCR  11*  10*  10*      CBC w/Diff Recent Labs      05/05/17   0411  05/04/17   1330  05/04/17   0415   WBC  5.7  5.8  5.6   RBC  2.53*  2.85*  2.62*   HGB  6.5*  7.5*  6.7*   HCT  19.7*  22.5*  20.4*   PLT  193  189  210   GRANS  66  59  68   LYMPH  23  18*  22   EOS  3  2  2      Cardiac Enzymes No results found for: CPK, CKMMB, CKMB, RCK3, CKMBT, CKNDX, CKND1, CARLI, TROPT, TROIQ, AMBERLY, TROPT, TNIPOC, BNP, BNPP   Coagulation No results for input(s): PTP, INR, APTT in the last 72 hours. No lab exists for component: INREXT    Lipid Panel Lab Results   Component Value Date/Time    Cholesterol, total 154 07/24/2012 01:00 AM    HDL Cholesterol 48 07/24/2012 01:00 AM    LDL, calculated 90.4 07/24/2012 01:00 AM    VLDL, calculated 15.6 07/24/2012 01:00 AM    Triglyceride 78 07/24/2012 01:00 AM    CHOL/HDL Ratio 3.2 07/24/2012 01:00 AM      BNP Lab Results   Component Value Date/Time    B-type Natriuretic Peptide 111.9 11/19/2013 10:17 AM      Liver Enzymes No results for input(s): TP, ALB, TBIL, AP, SGOT, GPT in the last 72 hours.     No lab exists for component: DBIL   Digoxin    Thyroid Studies Lab Results   Component Value Date/Time    TSH 0.51 04/20/2017 07:26 PM          Signed By: LUDMILA Pritchett     May 5, 2017

## 2017-05-05 NOTE — PROGRESS NOTES
Pt Hbg continues to be below therapeutic value at 6.5g/dL. Will hold today per department protocol and follow up as pt is medically stable. Thank you for this referral. Christiane Reed, PT, DPT.

## 2017-05-05 NOTE — PROGRESS NOTES
CM noted that [facility]. have declined pt due to no bed availability. [facility] is in pending status. Message left for admission coordinator to return call and request to review notes for updates.

## 2017-05-05 NOTE — PROGRESS NOTES
Infectious Disease Progress Note    Requested by: Dr. Fredy Runner, dr. Renetta Pinto    Reason: sepsis, acute paraplegia    Current abx Prior abx   Ceftriaxone since 4/24 Cefepime 4/20-4/21  vancomycin  4/20-4/24  Gentamicin 4/21-4/24  Metronidazole  4/20-4/24     Lines:   PICC RUE 4/26    Assessment :    77 y.o., right handed female, with an established history of hypertension, complete heart block with permanent pacemaker placed, diabetes mellitus, diabetic peripheral neuropathy, obesity, admitted to [facility] on 4/20/17. Clinical presentation consistent with  Sepsis (POA)  secondary to group B streptococcus bloodstream infection (positive blood cultures 4/20, negative blood cultures 4/21). Most likely source of bloodstream infection is infected right psoas hematoma/abscess. 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 No signs of neurological recovery on today's exam     2 week h/o pain at the site of abdominal pacemaker, tenderness at the site - however, patient doesn't have erythema at the site of pacemaker. No fluid collection noted at pacemaker pocket site per usg 4/24. Quick clearance of bacteremia would argue against endocarditis/endovascular infection. At this time risk of removal of pacemaker/general pocket change exceed the benefit. Discussed with cardiology. Would recommend close monitoring. Abdominal usg 4/24 doesn't reveal evidence of abscess/fluid collection at the site of pacemaker pocket. Enterococcus in urine cultures 4/20 likely colonizer    Acute renal failure: nephrology consult appreciated. Difficult to determine exact etiology of ARF at this time. Slight improvement in creatinine today. Low grade fever overnight - Ct scan 5/4 reveals increasing focal area within the right psoas muscle inferior to the previously drained area.  could represent either spread of infection inferiorly or intramuscular hematoma. Decreased drainage from the right psoas abscess. Monitor for partially drained abscess    No evidence of pneumonia    Recommendations:    1. cont ceftriaxone till 6/4/17  2. Obtain CXR to r/o hcap (clinical suspicion low)  3. Obtain ct abdomen/pelvis to evaluate psoas abscess  5. F/u cbc, temp, clinically    Above plan was discussed in details with patient . Please call me if any further questions or concerns. Will continue to participate in the care of this patient. subjective:    No increased right flank pain. Denies chills. Denies cp, sob. Unable to move legs. decreased pain at the site of pacemaker left abdomen. No nausea, vomiting. No new rash/itching/joint pain/back pain. Home Medication List    Details   gabapentin (NEURONTIN) 400 mg capsule Take 1 Cap by mouth two (2) times a day. Indications: NEUROPATHIC PAIN  Qty: 30 Cap, Refills: 0    Associated Diagnoses: Iliopsoas muscle hematoma, right, subsequent encounter      cholecalciferol (VITAMIN D3) 1,000 unit tablet Take 2 Tabs by mouth daily. Indications: PREVENTION OF VITAMIN D DEFICIENCY  Qty: 30 Tab, Refills: 0      SITagliptin (JANUVIA) 50 mg tablet Take 50 mg by mouth daily. Indications: type 2 diabetes mellitus      potassium chloride (KLOR-CON M20) 20 mEq tablet Take 20 mEq by mouth two (2) times a day. Indications: HYPOKALEMIA PREVENTION      ondansetron (ZOFRAN ODT) 4 mg disintegrating tablet Take 4 mg by mouth every eight (8) hours as needed for Nausea. cyclobenzaprine (FLEXERIL) 10 mg tablet Take 10 mg by mouth as needed for Muscle Spasm(s). Indications: MUSCLE SPASM      carvedilol (COREG) 6.25 mg tablet Take 1 Tab by mouth two (2) times daily (with meals).  Indications: hypertension  Qty: 30 Tab, Refills: 0    Associated Diagnoses: Benign hypertensive heart disease with systolic CHF, NYHA class 2 (HCC)      acetaminophen (TYLENOL) 325 mg tablet Take 2 Tabs by mouth every four (4) hours as needed (for fever or pain level less than 5/10). Indications: Fever, Pain  Qty: 30 Tab, Refills: 0    Associated Diagnoses: Iliopsoas muscle hematoma, right, subsequent encounter      allopurinol (ZYLOPRIM) 100 mg tablet Take 1 Tab by mouth daily. Indications: GOUT  Qty: 15 Tab, Refills: 0    Associated Diagnoses: Chronic gout, unspecified cause, unspecified site      insulin glargine (LANTUS) 100 unit/mL injection 15 Units by SubCUTAneous route nightly. Indications: type 2 diabetes mellitus  Qty: 1 Vial, Refills: 0    Associated Diagnoses: Type 2 diabetes mellitus with diabetic neuropathy, with long-term current use of insulin (HCC)      docusate sodium (COLACE) 100 mg capsule Take 1 Cap by mouth daily (after breakfast). Indications: Constipation  Qty: 15 Cap, Refills: 0      senna-docusate (PERICOLACE) 8.6-50 mg per tablet Take 2 Tabs by mouth daily (after dinner). Indications: Constipation  Qty: 30 Tab, Refills: 0      oxyCODONE-acetaminophen (PERCOCET 7.5) 7.5-325 mg per tablet Take 1 Tab by mouth every four (4) hours as needed (for pain level greater than 4/10). Max Daily Amount: 6 Tabs. Indications: Pain  Qty: 50 Tab, Refills: 0    Associated Diagnoses: Iliopsoas muscle hematoma, right, subsequent encounter      bumetanide (BUMEX) 1 mg tablet TAKE 1 TABLET TWICE A DAY  Qty: 180 Tab, Refills: 1      pravastatin (PRAVACHOL) 40 mg tablet Take 40 mg by mouth nightly. Indications: DYSLIPIDEMIA      ferrous sulfate 325 mg (65 mg iron) tablet Take 1 Tab by mouth two (2) times daily (with meals).   Qty: 30 Tab, Refills: 0      insulin aspart (NOVOLOG) 100 unit/mL injection INITIATE INSULIN CORRECTIVE PROTOCOL (HR): Normal Insulin Sensitivity  For Blood Sugar (mg/dL) of:    Less than 150 =   0 units          150 -199 =   2 units 200 -249 =   4 units 250 -299 =   6 units 300 -349 =   8 units 350 and above =   10 units If 2 glucose readings are above 200 mg/dL  Qty: 10 mL, Refills: 6             Current Facility-Administered Medications   Medication Dose Route Frequency    0.9% sodium chloride infusion 250 mL  250 mL IntraVENous PRN    0.9% sodium chloride infusion 250 mL  250 mL IntraVENous PRN    famotidine (PEPCID) tablet 20 mg  20 mg Oral DAILY    insulin lispro (HUMALOG) injection   SubCUTAneous AC&HS    0.9% sodium chloride infusion 250 mL  250 mL IntraVENous PRN    cefTRIAXone (ROCEPHIN) 2 g in 0.9% sodium chloride (MBP/ADV) 50 mL MBP  2 g IntraVENous Q24H    insulin glargine (LANTUS) injection 20 Units  20 Units SubCUTAneous DAILY    diphenhydrAMINE (BENADRYL) capsule 50 mg  50 mg Oral Q4H PRN    polyethylene glycol (MIRALAX) packet 17 g  17 g Oral TID    cholecalciferol (VITAMIN D3) tablet 2,000 Units  2,000 Units Oral DAILY    docusate sodium (COLACE) capsule 100 mg  100 mg Oral DAILY AFTER BREAKFAST    gabapentin (NEURONTIN) capsule 400 mg  400 mg Oral BID    oxyCODONE-acetaminophen (PERCOCET 7.5) 7.5-325 mg per tablet 1 Tab  1 Tab Oral Q4H PRN    pravastatin (PRAVACHOL) tablet 40 mg  40 mg Oral QHS    senna-docusate (PERICOLACE) 8.6-50 mg per tablet 2 Tab  2 Tab Oral PCD    acetaminophen (TYLENOL) tablet 500 mg  500 mg Oral Q6H PRN    allopurinol (ZYLOPRIM) tablet 100 mg  100 mg Oral DAILY    clotrimazole (MYCELEX) frances 10 mg  10 mg Oral 5XD    folic acid (FOLVITE) tablet 1 mg  1 mg Oral DAILY    ondansetron (ZOFRAN) injection 4 mg  4 mg IntraVENous Q8H PRN    glucose chewable tablet 16 g  16 g Oral PRN    glucagon (GLUCAGEN) injection 1 mg  1 mg IntraMUSCular PRN    dextrose (D50W) injection syrg 12.5-25 g  25-50 mL IntraVENous PRN       Allergies: Vancomycin; Ampicillin; Bactrim [sulfamethoxazole-trimethoprim]; Blueberry; Ciprofloxacin; Codeine; Crestor [rosuvastatin]; Darvocet a500 [propoxyphene n-acetaminophen]; Demerol [meperidine]; Levaquin [levofloxacin]; Lipitor [atorvastatin]; Magnesium oxide; Minocin [minocycline]; Pcn [penicillins];  Pravachol [pravastatin]; Sulfa (sulfonamide antibiotics); Ultracet [tramadol-acetaminophen]; Vicodin [hydrocodone-acetaminophen]; Vytorin 10-10 [ezetimibe-simvastatin]; and Percodan [oxycodone hcl-oxycodone-asa]    Temp (24hrs), Av.8 °F (37.1 °C), Min:98 °F (36.7 °C), Max:99.4 °F (37.4 °C)    Visit Vitals    /61 (BP 1 Location: Left arm, BP Patient Position: At rest)    Pulse 74    Temp 98.5 °F (36.9 °C)    Resp 18    Ht 5' 7\" (1.702 m)    Wt 113.6 kg (250 lb 8 oz)    SpO2 96%    Breastfeeding No    BMI 39.23 kg/m2       ROS: patient unable to communicate fluently. Detailed ros not feasible. Physical Exam:    General: Well developed, well nourished female laying on the bed AAOx3 NAD    General:   awake alert and oriented   HEENT:  Normocephalic, atraumatic, PERRL, EOMI, no scleral icterus or pallor; no conjunctival hemmohage;  nasal and oral mucous are moist and without evidence of lesions. Neck supple, no bruits. Lymph Nodes:   no cervical, axillary or inguinal adenopathy   Lungs:   non-labored, bilaterally clear to auscultation- no crackles wheezes rales or rhonchi   Heart:   s1 and s2 irregular; no rubs or gallops, no edema, + pedal pulses   Abdomen:  soft, non-distended, active bowel sounds, no hepatomegaly, no splenomegaly. no tenderness over the left abdominal pacemaker, no overlying erythema or fluctuance   Genitourinary:  deferred   Extremities:   no clubbing, cyanosis; no joint effusions or swelling; muscle mass appropriate for age   Neurologic:  No gross focal sensory abnormalities; 5/5 muscle strength to upper extremities. 0/5 strength in lower extremities.   Cranial nerves intact                        Skin:  Surgical scars abdomen, left neck well healed   Back:  right flank drain with purulent drainage,  no paraspinal muscle guarding or rigidity, right CVA tenderness     Psychiatric:  No suicidal or homicidal ideations, appropriate mood and affect         Labs: Results:   Chemistry Recent Labs      17 0411  05/04/17   0415  05/03/17   1240   GLU  79  71*  146*   NA  136  136  135*   K  3.7  3.8  4.0   CL  104  103  99*   CO2  25  25  27   BUN  31*  32*  33*   CREA  2.80*  3.05*  3.17*   CA  8.1*  7.8*  7.8*   AGAP  7  8  9   BUCR  11*  10*  10*      CBC w/Diff Recent Labs      05/05/17   0411  05/04/17   1330  05/04/17   0415   WBC  5.7  5.8  5.6   RBC  2.53*  2.85*  2.62*   HGB  6.5*  7.5*  6.7*   HCT  19.7*  22.5*  20.4*   PLT  193  189  210   GRANS  66  59  68   LYMPH  23  18*  22   EOS  3  2  2      Microbiology No results for input(s): CULT in the last 72 hours.        RADIOLOGY:    All available imaging studies/reports in [facility] for this admission were reviewed    Dr. Davon Alvarado, Infectious Disease Specialist  [phone]  May 5, 2017  3:49 PM

## 2017-05-05 NOTE — PROGRESS NOTES
[facility]  Progress Note    Patient: Pooja Come Age: 77 y.o. : 1950 MR#: [id number] SSN: [id number]  Date/Time: 2017     Subjective:     More awake   No sensations / to minimal sensations in legs   No h/o bleed   No NVD     Assessment/Plan:    77 yr old Female with PMH of iliopsoas hematoma , patient developed sudden onset of Right  leg weakness , patient evaluated by rehab MD and was transferred to medical service for further management . Dr Amilcar Marin - spine surgery consulted , neurology consulted , patient is admitted to ICU        1 Right Leg weakness   - Infected Psoas  hematoma s/p drain ,   - follow with ID   - Rpt CT showed mild increase in Size   - With drop in H/H ( This time not drawn from iv )   - Anemia post hemorrhagic - BT 2 units ordered by covering MD     2 CMO - LBBB, - followed by cardiology   - H/O PACE Maker placement for CHB   - no evidence of pacemaker pocket infection       3 DM2  - no hypoglycemia noted   - Continue current management     4 H/O DVT off  AC - due to Bleed     5 Elevated trop - resolved     6 Acute blood loss anemia   - stable       7 UTI- POA   - Continue current antibiotics   -  urine culture - E Fecalis     8 LISBETH   - some improvement in renal function - continue current treatments   - follow renal duplex   - improvement after IV hydration - ? all related to pre renal azo.  From dehydration     8 Anemia acute blood loss   -BT arranged   - monitor h/h       D/w patient   On discharge they would like to go home       Case discussed with:  [x]Patient  [x]Family  [x]Nursing  []Case Management  DVT Prophylaxis:  []Lovenox  []Hep SQ  [x]SCDs  []Coumadin   []On Heparin gtt    Patient Active Problem List   Diagnosis Code    Nonischemic cardiomyopathy ([facility] I42.8    History of complete heart block Z86.79    Biventricular implantable cardioverter-defibrillator in situ Z95.810    Left bundle branch block (LBBB) on electrocardiogram I44.7    Type 2 diabetes mellitus with diabetic neuropathy (HCC) E11.40    Dyslipidemia E78.5    Diabetic neuropathy associated with [facility] 2 diabetes mellitus ([facility] E11.40    Obstructive sleep apnea on CPAP G47.33, Z99.89    AICD generator infection ([facility] T82. 7XXA    Difficult airway for intubation T88. 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 1XXA    Impaired mobility and ADLs Z74.09    History of Coumadin therapy Z79.01    Gout M10.9    Acute paraplegia (HCC) G82.20    Sepsis ([facility] A41.9    Psoas abscess, right ([facility] K68.12    Krueger catheter in place on admission Z96.0    Urinary tract infection due to Enterococcus N39.0, B95.2    Group B streptococcal infection A49.1       Objective:   VS:   Visit Vitals    /62 (BP 1 Location: Left arm, BP Patient Position: At rest)    Pulse 82    Temp 97.6 °F (36.4 °C)    Resp 18    Ht 5' 7\" (1.702 m)    Wt 113.6 kg (250 lb 8 oz)    SpO2 97%    Breastfeeding No    BMI 39.23 kg/m2      Tmax/24hrs: Temp (24hrs), Av.7 °F (37.1 °C), Min:97.6 °F (36.4 °C), Max:99.4 °F (37.4 °C)  IOBRIEF    Intake/Output Summary (Last 24 hours) at 17 1434  Last data filed at 17 0330   Gross per 24 hour   Intake              540 ml   Output             1725 ml   Net            -1185 ml       General:  Appears in better mood and comfortable   HEENT:  NC, Atraumatic. PERRLA, anicteric sclerae. Pulmonary:  CTA Bilaterally. No Wheezing/Rhonchi/Rales. Cardiovascular: Regular rate and Rhythm. GI:  Soft, Non distended, Non tender. + Bowel sounds. Extremities:  No edema, cyanosis, clubbing. No calf tenderness. Psych:  Not anxious or agitated. Neurologic: Grossly - Motor and Sensory functions are intact .  No Anxiety , calm , no Agitation         Medications:   Current Facility-Administered Medications   Medication Dose Route Frequency    0.9% sodium chloride infusion 250 mL  250 mL IntraVENous PRN    0.9% sodium chloride infusion 250 mL  250 mL IntraVENous PRN    famotidine (PEPCID) tablet 20 mg  20 mg Oral DAILY    insulin lispro (HUMALOG) injection   SubCUTAneous AC&HS    0.9% sodium chloride infusion 250 mL  250 mL IntraVENous PRN    cefTRIAXone (ROCEPHIN) 2 g in 0.9% sodium chloride (MBP/ADV) 50 mL MBP  2 g IntraVENous Q24H    insulin glargine (LANTUS) injection 20 Units  20 Units SubCUTAneous DAILY    diphenhydrAMINE (BENADRYL) capsule 50 mg  50 mg Oral Q4H PRN    polyethylene glycol (MIRALAX) packet 17 g  17 g Oral TID    cholecalciferol (VITAMIN D3) tablet 2,000 Units  2,000 Units Oral DAILY    docusate sodium (COLACE) capsule 100 mg  100 mg Oral DAILY AFTER BREAKFAST    gabapentin (NEURONTIN) capsule 400 mg  400 mg Oral BID    oxyCODONE-acetaminophen (PERCOCET 7.5) 7.5-325 mg per tablet 1 Tab  1 Tab Oral Q4H PRN    pravastatin (PRAVACHOL) tablet 40 mg  40 mg Oral QHS    senna-docusate (PERICOLACE) 8.6-50 mg per tablet 2 Tab  2 Tab Oral PCD    acetaminophen (TYLENOL) tablet 500 mg  500 mg Oral Q6H PRN    allopurinol (ZYLOPRIM) tablet 100 mg  100 mg Oral DAILY    clotrimazole (MYCELEX) frances 10 mg  10 mg Oral 5XD    folic acid (FOLVITE) tablet 1 mg  1 mg Oral DAILY    ondansetron (ZOFRAN) injection 4 mg  4 mg IntraVENous Q8H PRN    glucose chewable tablet 16 g  16 g Oral PRN    glucagon (GLUCAGEN) injection 1 mg  1 mg IntraMUSCular PRN    dextrose (D50W) injection syrg 12.5-25 g  25-50 mL IntraVENous PRN Labs:    Recent Results (from the past 24 hour(s))   GLUCOSE, POC    Collection Time: 05/04/17  3:56 PM   Result Value Ref Range    Glucose (POC) 130 (H) 70 - 110 mg/dL   GLUCOSE, POC    Collection Time: 05/04/17  5:36 PM   Result Value Ref Range    Glucose (POC) 159 (H) 70 - 110 mg/dL   GLUCOSE, POC    Collection Time: 05/04/17 10:03 PM   Result Value Ref Range    Glucose (POC) 167 (H) 70 - 110 mg/dL   GLUCOSE, POC    Collection Time: 05/05/17 12:24 AM   Result Value Ref Range    Glucose (POC) 113 (H) 70 - 110 mg/dL   GLUCOSE, POC    Collection Time: 05/05/17  3:50 AM   Result Value Ref Range    Glucose (POC) 85 70 - 398 mg/dL   METABOLIC PANEL, BASIC    Collection Time: 05/05/17  4:11 AM   Result Value Ref Range    Sodium 136 136 - 145 mmol/L    Potassium 3.7 3.5 - 5.5 mmol/L    Chloride 104 100 - 108 mmol/L    CO2 25 21 - 32 mmol/L    Anion gap 7 3.0 - 18 mmol/L    Glucose 79 74 - 99 mg/dL    BUN 31 (H) 7.0 - 18 MG/DL    Creatinine 2.80 (H) 0.6 - 1.3 MG/DL    BUN/Creatinine ratio 11 (L) 12 - 20      GFR est AA 20 (L) >60 ml/min/1.73m2    GFR est non-AA 17 (L) >60 ml/min/1.73m2    Calcium 8.1 (L) 8.5 - 10.1 MG/DL   CBC WITH AUTOMATED DIFF    Collection Time: 05/05/17  4:11 AM   Result Value Ref Range    WBC 5.7 4.6 - 13.2 K/uL    RBC 2.53 (L) 4.20 - 5.30 M/uL    HGB 6.5 (L) 12.0 - 16.0 g/dL    HCT 19.7 (L) 35.0 - 45.0 %    MCV 77.9 74.0 - 97.0 FL    MCH 25.7 24.0 - 34.0 PG    MCHC 33.0 31.0 - 37.0 g/dL    RDW 16.4 (H) 11.6 - 14.5 %    PLATELET 370 955 - 414 K/uL    MPV 8.9 (L) 9.2 - 11.8 FL    NEUTROPHILS 66 40 - 73 %    LYMPHOCYTES 23 21 - 52 %    MONOCYTES 8 3 - 10 %    EOSINOPHILS 3 0 - 5 %    BASOPHILS 0 0 - 2 %    ABS. NEUTROPHILS 3.7 1.8 - 8.0 K/UL    ABS. LYMPHOCYTES 1.3 0.9 - 3.6 K/UL    ABS. MONOCYTES 0.5 0.05 - 1.2 K/UL    ABS. EOSINOPHILS 0.2 0.0 - 0.4 K/UL    ABS.  BASOPHILS 0.0 0.0 - 0.1 K/UL    DF AUTOMATED     TYPE & CROSSMATCH    Collection Time: 05/05/17  4:40 AM   Result Value Ref Range Crossmatch Expiration 05/08/2017     ABO/Rh(D) O POSITIVE     Antibody screen NEG     CALLED TO: 4S T GALA ON 05/05/2017 AT 0836 BY PIPE/4129.      Unit number [id number]     Blood component type RC LR AS3,2     Unit division 00     Status of unit ISSUED     Crossmatch result Compatible     Unit number [id number]     Blood component type RC LR AS1     Unit division 00     Status of unit ALLOCATED     Crossmatch result Compatible    GLUCOSE, POC    Collection Time: 05/05/17  6:21 AM   Result Value Ref Range    Glucose (POC) 84 70 - 110 mg/dL   GLUCOSE, POC    Collection Time: 05/05/17 11:40 AM   Result Value Ref Range    Glucose (POC) 146 (H) 70 - 110 mg/dL       Signed By: April Rojas MD     May 5, 2017

## 2017-05-05 NOTE — PROGRESS NOTES
RENAL DAILY PROGRESS NOTE    Impression & Plan:   IMPRESSION:   · Acute kidney injury, recent episode last week, multiple risk factor ,urinary retention, ( high suspicion ) , poor po intake over last few weeks, ongoing sepsis, ? Septic emboli, Medication toxicity such as was on gentamycin, ? Allergic reaction from ceftriaxone--- renal function started to improve, good urine output  · Urinary retention, ? nurogenic bladder,   · Right leg weakness  · Heart block, S/p AICD placement   PLAN:   · DC iv fluid ,given her heart condition, monitor renal function off iv fluid   · She will need perez for now as suspect neurogenic bladder. Subjective:       Complaint:     Overnight events noted  Feels okay   no nausea, vomiting, chest pain, short of breath, cough, seizure.      Current Facility-Administered Medications   Medication Dose Route Frequency    0.9% sodium chloride infusion 250 mL  250 mL IntraVENous PRN    0.9% sodium chloride infusion 250 mL  250 mL IntraVENous PRN    famotidine (PEPCID) tablet 20 mg  20 mg Oral DAILY    insulin lispro (HUMALOG) injection   SubCUTAneous AC&HS    0.9% sodium chloride infusion 250 mL  250 mL IntraVENous PRN    cefTRIAXone (ROCEPHIN) 2 g in 0.9% sodium chloride (MBP/ADV) 50 mL MBP  2 g IntraVENous Q24H    insulin glargine (LANTUS) injection 20 Units  20 Units SubCUTAneous DAILY    diphenhydrAMINE (BENADRYL) capsule 50 mg  50 mg Oral Q4H PRN    polyethylene glycol (MIRALAX) packet 17 g  17 g Oral TID    cholecalciferol (VITAMIN D3) tablet 2,000 Units  2,000 Units Oral DAILY    docusate sodium (COLACE) capsule 100 mg  100 mg Oral DAILY AFTER BREAKFAST    gabapentin (NEURONTIN) capsule 400 mg  400 mg Oral BID    oxyCODONE-acetaminophen (PERCOCET 7.5) 7.5-325 mg per tablet 1 Tab  1 Tab Oral Q4H PRN    pravastatin (PRAVACHOL) tablet 40 mg  40 mg Oral QHS    senna-docusate (PERICOLACE) 8.6-50 mg per tablet 2 Tab  2 Tab Oral PCD    acetaminophen (TYLENOL) tablet 500 mg  500 mg Oral Q6H PRN    allopurinol (ZYLOPRIM) tablet 100 mg  100 mg Oral DAILY    clotrimazole (MYCELEX) frances 10 mg  10 mg Oral 5XD    folic acid (FOLVITE) tablet 1 mg  1 mg Oral DAILY    ondansetron (ZOFRAN) injection 4 mg  4 mg IntraVENous Q8H PRN    glucose chewable tablet 16 g  16 g Oral PRN    glucagon (GLUCAGEN) injection 1 mg  1 mg IntraMUSCular PRN    dextrose (D50W) injection syrg 12.5-25 g  25-50 mL IntraVENous PRN       Review of Symptoms: comprehensive ROS negative except above.    Objective:     Patient Vitals for the past 24 hrs:   Temp Pulse Resp BP SpO2   05/05/17 1056 98.5 °F (36.9 °C) 76 18 101/58 95 %   05/05/17 0814 98.6 °F (37 °C) 70 18 104/58 94 %   05/05/17 0700 99.2 °F (37.3 °C) 91 17 99/61 -   05/05/17 0400 98 °F (36.7 °C) 73 18 108/61 94 %   05/05/17 0106 99.1 °F (37.3 °C) 71 18 98/58 96 %   05/05/17 0015 - - - 93/52 -   05/05/17 0000 99.4 °F (37.4 °C) 84 16 (!) 87/47 94 %   05/04/17 2000 99.4 °F (37.4 °C) 89 20 102/61 97 %   05/04/17 1551 98.9 °F (37.2 °C) 82 20 124/65 98 %        Weight change: 4.626 kg (10 lb 3.2 oz)     05/03 1901 - 05/05 0700  In: 830 [P.O.:480; I.V.:350]  Out: 1725 [Urine:1725]    Intake/Output Summary (Last 24 hours) at 05/05/17 1059  Last data filed at 05/05/17 0330   Gross per 24 hour   Intake              780 ml   Output             1725 ml   Net             -945 ml     Physical Exam:   General: comfortable, no acute distress   HEENT sclera anicteric, supple neck, no thyromegaly  CVS: S1S2 heard, no rub  RS: + air entry b/l,   Abd: Soft, Non tender,  Neuro: awake  Extrm: + edema, no cyanosis, clubbing   Skin: no visible Rash  Musculoskeletal: No gross joints or bone deformities            Data Review:     LABS:   Hematology:   Recent Labs      05/05/17   0411  05/04/17   1330  05/04/17   0415   WBC  5.7  5.8  5.6   HGB  6.5*  7.5*  6.7*   HCT  19.7*  22.5*  20.4*     Chemistry:   Recent Labs      05/05/17   0411  05/04/17   0415  05/03/17 1240  05/02/17   1500   BUN  31*  32*  33*  37*   CREA  2.80*  3.05*  3.17*  3.40*   CA  8.1*  7.8*  7.8*  7.6*   K  3.7  3.8  4.0  4.5   NA  136  136  135*  134*   CL  104  103  99*  99*   CO2  25  25  27  28   GLU  79  71*  146*  90              Procedures/imaging: see electronic medical records for all procedures, Xrays and details which were not copied into this note but were reviewed prior to creation of Plan          Assessment & Plan:     As above         Martha Valdovinos MD  5/5/2017  3:15 PM

## 2017-05-06 LAB
ANION GAP BLD CALC-SCNC: 8 MMOL/L (ref 3–18)
BUN SERPL-MCNC: 28 MG/DL (ref 7–18)
BUN/CREAT SERPL: 11 (ref 12–20)
CALCIUM SERPL-MCNC: 8 MG/DL (ref 8.5–10.1)
CHLORIDE SERPL-SCNC: 104 MMOL/L (ref 100–108)
CO2 SERPL-SCNC: 23 MMOL/L (ref 21–32)
CREAT SERPL-MCNC: 2.49 MG/DL (ref 0.6–1.3)
GLUCOSE BLD STRIP.AUTO-MCNC: 104 MG/DL (ref 70–110)
GLUCOSE BLD STRIP.AUTO-MCNC: 115 MG/DL (ref 70–110)
GLUCOSE BLD STRIP.AUTO-MCNC: 149 MG/DL (ref 70–110)
GLUCOSE BLD STRIP.AUTO-MCNC: 151 MG/DL (ref 70–110)
GLUCOSE SERPL-MCNC: 128 MG/DL (ref 74–99)
POTASSIUM SERPL-SCNC: 3.5 MMOL/L (ref 3.5–5.5)
SODIUM SERPL-SCNC: 135 MMOL/L (ref 136–145)

## 2017-05-06 PROCEDURE — 74011636637 HC RX REV CODE- 636/637: Performed by: INTERNAL MEDICINE

## 2017-05-06 PROCEDURE — 82962 GLUCOSE BLOOD TEST: CPT

## 2017-05-06 PROCEDURE — 74011000258 HC RX REV CODE- 258: Performed by: INTERNAL MEDICINE

## 2017-05-06 PROCEDURE — 36430 TRANSFUSION BLD/BLD COMPNT: CPT

## 2017-05-06 PROCEDURE — 80048 BASIC METABOLIC PNL TOTAL CA: CPT | Performed by: INTERNAL MEDICINE

## 2017-05-06 PROCEDURE — 97110 THERAPEUTIC EXERCISES: CPT

## 2017-05-06 PROCEDURE — 74011250636 HC RX REV CODE- 250/636: Performed by: INTERNAL MEDICINE

## 2017-05-06 PROCEDURE — P9016 RBC LEUKOCYTES REDUCED: HCPCS | Performed by: INTERNAL MEDICINE

## 2017-05-06 PROCEDURE — 97530 THERAPEUTIC ACTIVITIES: CPT

## 2017-05-06 PROCEDURE — 74011636637 HC RX REV CODE- 636/637: Performed by: HOSPITALIST

## 2017-05-06 PROCEDURE — 65660000000 HC RM CCU STEPDOWN

## 2017-05-06 PROCEDURE — 74011250637 HC RX REV CODE- 250/637: Performed by: INTERNAL MEDICINE

## 2017-05-06 PROCEDURE — 36415 COLL VENOUS BLD VENIPUNCTURE: CPT | Performed by: INTERNAL MEDICINE

## 2017-05-06 PROCEDURE — 74011250637 HC RX REV CODE- 250/637: Performed by: HOSPITALIST

## 2017-05-06 RX ADMIN — PRAVASTATIN SODIUM 40 MG: 20 TABLET ORAL at 22:15

## 2017-05-06 RX ADMIN — OXYCODONE HYDROCHLORIDE AND ACETAMINOPHEN 1 TABLET: 7.5; 325 TABLET ORAL at 20:12

## 2017-05-06 RX ADMIN — INSULIN LISPRO 3 UNITS: 100 INJECTION, SOLUTION INTRAVENOUS; SUBCUTANEOUS at 16:14

## 2017-05-06 RX ADMIN — CLOTRIMAZOLE 10 MG: 10 LOZENGE ORAL at 14:07

## 2017-05-06 RX ADMIN — CLOTRIMAZOLE 10 MG: 10 LOZENGE ORAL at 11:10

## 2017-05-06 RX ADMIN — CHOLECALCIFEROL TAB 25 MCG (1000 UNIT) 2000 UNITS: 25 TAB at 08:40

## 2017-05-06 RX ADMIN — FAMOTIDINE 20 MG: 20 TABLET ORAL at 08:40

## 2017-05-06 RX ADMIN — CLOTRIMAZOLE 10 MG: 10 LOZENGE ORAL at 07:10

## 2017-05-06 RX ADMIN — DOCUSATE SODIUM 100 MG: 100 CAPSULE, LIQUID FILLED ORAL at 08:40

## 2017-05-06 RX ADMIN — INSULIN GLARGINE 20 UNITS: 100 INJECTION, SOLUTION SUBCUTANEOUS at 08:39

## 2017-05-06 RX ADMIN — CEFTRIAXONE SODIUM 2 G: 2 INJECTION, POWDER, FOR SOLUTION INTRAMUSCULAR; INTRAVENOUS at 11:10

## 2017-05-06 RX ADMIN — GABAPENTIN 400 MG: 400 CAPSULE ORAL at 17:23

## 2017-05-06 RX ADMIN — FOLIC ACID 1 MG: 1 TABLET ORAL at 08:40

## 2017-05-06 RX ADMIN — CLOTRIMAZOLE 10 MG: 10 LOZENGE ORAL at 17:23

## 2017-05-06 RX ADMIN — Medication 2 TABLET: at 17:23

## 2017-05-06 RX ADMIN — ALLOPURINOL 100 MG: 100 TABLET ORAL at 08:40

## 2017-05-06 RX ADMIN — GABAPENTIN 400 MG: 400 CAPSULE ORAL at 08:41

## 2017-05-06 NOTE — PROGRESS NOTES
[facility] Hospitalist Group  Progress Note    Patient: Antoine Angulo Age: 77 y.o. : 1950 MR#: [id number] SSN: [id number]  Date/Time: 2017     Subjective:     More awake   No sensations / to minimal sensations in legs   No h/o bleed   No NVD     Assessment/Plan:    77 yr old Female with PMH of iliopsoas hematoma , patient developed sudden onset of Right  leg weakness , patient evaluated by rehab MD and was transferred to medical service for further management . Dr Ishaan Lamb - spine surgery consulted , neurology consulted , patient is admitted to ICU. Now out of ICU . Had LISBETH from urine retention ? Neurogenic bladder , urology following , perez placed back again and Cr is improving , renal following .          1 Right Leg weakness - Infected Psoas hematoma , s/p drain by IR now draining dirty material   2 CMO - LBBB,- H/O PACE Maker placement for CHB -  no evidence of pacemaker pocket infection  3 DM2- on Long acting and SSI - Blood glucose acceptable   4 H/O DVT off  AC - due to Bleed   5 Elevated trop - resolved   6 UTI- POA - continue current antibiotics - ceftriaxone   7 LISBETH - gradual improvement in Cr , continue Perez's cath - for urinary retention - s/b Urology   8 Anemia acute blood loss - s/p BT , monitor H/H       D/w patient     Case discussed with:  [x]Patient  [x]Family  [x]Nursing  []Case Management  DVT Prophylaxis:  []Lovenox  []Hep SQ  [x]SCDs  []Coumadin   []On Heparin gtt  On discharge they would like to go home / this may change   Patient Active Problem List   Diagnosis Code    Nonischemic cardiomyopathy ([facility] I42.8    History of complete heart block Z86.79    Biventricular implantable cardioverter-defibrillator in situ Z95.810    Left bundle branch block (LBBB) on electrocardiogram I44.7    Type 2 diabetes mellitus with diabetic neuropathy ([facility] E11.40    Dyslipidemia E78.5    Diabetic neuropathy associated with type 2 diabetes mellitus ([facility] E11.40    Obstructive sleep apnea on CPAP G47.33, Z99.89    AICD generator infection ([address] T82. 7XXA    Difficult airway for intubation T88. 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 1XXA    Impaired mobility and ADLs Z74.09    History of Coumadin therapy Z79.01    Gout M10.9    Acute paraplegia ([facility]) G82.20    Sepsis ([facility] A41.9    Psoas abscess, right ([address] K68.12    Krueger catheter in place on admission Z96.0    Urinary tract infection due to Enterococcus N39.0, B95.2    Group B streptococcal infection A49.1       Objective:   VS:   Visit Vitals    /67 (BP 1 Location: Left arm, BP Patient Position: Supine)    Pulse 80    Temp 98.3 °F (36.8 °C)    Resp 17    Ht 5' 7\" (1.702 m)    Wt 115.4 kg (254 lb 8 oz)    SpO2 98%    Breastfeeding No    BMI 39.86 kg/m2      Tmax/24hrs: Temp (24hrs), Av.2 °F (36.8 °C), Min:97.9 °F (36.6 °C), Max:99.2 °F (37.3 °C)  IOBRIEF    Intake/Output Summary (Last 24 hours) at 17 1642  Last data filed at 17 1616   Gross per 24 hour   Intake              790 ml   Output             3610 ml   Net            -2820 ml       General:  Appears in better mood and comfortable   HEENT:  NC, Atraumatic. PERRLA, anicteric sclerae. Pulmonary:  CTA Bilaterally. No Wheezing/Rhonchi/Rales. Cardiovascular: Regular rate and Rhythm. GI:  Soft, Non distended, Non tender. + Bowel sounds. Extremities:  No edema, cyanosis, clubbing. No calf tenderness.    Psych:  Not anxious or agitated. Neurologic: Grossly - Motor and Sensory functions are intact .  No Anxiety , calm , no Agitation         Medications:   Current Facility-Administered Medications   Medication Dose Route Frequency    0.9% sodium chloride infusion 250 mL  250 mL IntraVENous PRN    0.9% sodium chloride infusion 250 mL  250 mL IntraVENous PRN    famotidine (PEPCID) tablet 20 mg  20 mg Oral DAILY    insulin lispro (HUMALOG) injection   SubCUTAneous AC&HS    0.9% sodium chloride infusion 250 mL  250 mL IntraVENous PRN    cefTRIAXone (ROCEPHIN) 2 g in 0.9% sodium chloride (MBP/ADV) 50 mL MBP  2 g IntraVENous Q24H    insulin glargine (LANTUS) injection 20 Units  20 Units SubCUTAneous DAILY    diphenhydrAMINE (BENADRYL) capsule 50 mg  50 mg Oral Q4H PRN    polyethylene glycol (MIRALAX) packet 17 g  17 g Oral TID    cholecalciferol (VITAMIN D3) tablet 2,000 Units  2,000 Units Oral DAILY    docusate sodium (COLACE) capsule 100 mg  100 mg Oral DAILY AFTER BREAKFAST    gabapentin (NEURONTIN) capsule 400 mg  400 mg Oral BID    oxyCODONE-acetaminophen (PERCOCET 7.5) 7.5-325 mg per tablet 1 Tab  1 Tab Oral Q4H PRN    pravastatin (PRAVACHOL) tablet 40 mg  40 mg Oral QHS    senna-docusate (PERICOLACE) 8.6-50 mg per tablet 2 Tab  2 Tab Oral PCD    acetaminophen (TYLENOL) tablet 500 mg  500 mg Oral Q6H PRN    allopurinol (ZYLOPRIM) tablet 100 mg  100 mg Oral DAILY    clotrimazole (MYCELEX) frances 10 mg  10 mg Oral 5XD    folic acid (FOLVITE) tablet 1 mg  1 mg Oral DAILY    ondansetron (ZOFRAN) injection 4 mg  4 mg IntraVENous Q8H PRN    glucose chewable tablet 16 g  16 g Oral PRN    glucagon (GLUCAGEN) injection 1 mg  1 mg IntraMUSCular PRN    dextrose (D50W) injection syrg 12.5-25 g  25-50 mL IntraVENous PRN       Labs:    Recent Results (from the past 24 hour(s))   GLUCOSE, POC    Collection Time: 05/05/17  4:49 PM   Result Value Ref Range    Glucose (POC) 137 (H) 70 - 110 mg/dL   HGB & HCT Collection Time: 05/05/17  7:52 PM   Result Value Ref Range    HGB 7.4 (L) 12.0 - 16.0 g/dL    HCT 22.4 (L) 35.0 - 45.0 %   GLUCOSE, POC    Collection Time: 05/05/17 10:11 PM   Result Value Ref Range    Glucose (POC) 209 (H) 70 - 900 mg/dL   METABOLIC PANEL, BASIC    Collection Time: 05/06/17  5:55 AM   Result Value Ref Range    Sodium 135 (L) 136 - 145 mmol/L    Potassium 3.5 3.5 - 5.5 mmol/L    Chloride 104 100 - 108 mmol/L    CO2 23 21 - 32 mmol/L    Anion gap 8 3.0 - 18 mmol/L    Glucose 128 (H) 74 - 99 mg/dL    BUN 28 (H) 7.0 - 18 MG/DL    Creatinine 2.49 (H) 0.6 - 1.3 MG/DL    BUN/Creatinine ratio 11 (L) 12 - 20      GFR est AA 23 (L) >60 ml/min/1.73m2    GFR est non-AA 19 (L) >60 ml/min/1.73m2    Calcium 8.0 (L) 8.5 - 10.1 MG/DL   GLUCOSE, POC    Collection Time: 05/06/17  6:30 AM   Result Value Ref Range    Glucose (POC) 104 70 - 110 mg/dL   GLUCOSE, POC    Collection Time: 05/06/17 11:31 AM   Result Value Ref Range    Glucose (POC) 115 (H) 70 - 110 mg/dL   GLUCOSE, POC    Collection Time: 05/06/17  3:34 PM   Result Value Ref Range    Glucose (POC) 151 (H) 70 - 110 mg/dL       Signed By: José Pierre MD     May 6, 2017

## 2017-05-06 NOTE — PROGRESS NOTES
Urology Progress Note    Subjective:     No c/o. No fever. No back pain. Objective:     Visit Vitals    /67 (BP 1 Location: Left arm, BP Patient Position: Supine)    Pulse 80    Temp 98.3 °F (36.8 °C)    Resp 17    Ht 5' 7\" (1.702 m)    Wt 254 lb 8 oz (115.4 kg)    SpO2 98%    Breastfeeding No    BMI 39.86 kg/m2        Temp (24hrs), Av.3 °F (36.8 °C), Min:97.9 °F (36.6 °C), Max:99.2 °F (37.3 °C)      Intake and Output:   190 -  0700  In: 296.3 [P.O.:120]  Out: 1400 [Urine:1400]   07 -  1900  In: 670 [P.O.:120;  I.V.:550]  Out: 1610 [Urine:1600; Drains:10]    Physical Exam:   GEN: NAD  PULM: Normal respiratory effort  BACK: Scant purulent fluid in perc drain  : clear urine in perez        Data Review:  Recent Results (from the past 24 hour(s))   GLUCOSE, POC    Collection Time: 17  4:49 PM   Result Value Ref Range    Glucose (POC) 137 (H) 70 - 110 mg/dL   HGB & HCT    Collection Time: 17  7:52 PM   Result Value Ref Range    HGB 7.4 (L) 12.0 - 16.0 g/dL    HCT 22.4 (L) 35.0 - 45.0 %   GLUCOSE, POC    Collection Time: 17 10:11 PM   Result Value Ref Range    Glucose (POC) 209 (H) 70 - 132 mg/dL   METABOLIC PANEL, BASIC    Collection Time: 17  5:55 AM   Result Value Ref Range    Sodium 135 (L) 136 - 145 mmol/L    Potassium 3.5 3.5 - 5.5 mmol/L    Chloride 104 100 - 108 mmol/L    CO2 23 21 - 32 mmol/L    Anion gap 8 3.0 - 18 mmol/L    Glucose 128 (H) 74 - 99 mg/dL    BUN 28 (H) 7.0 - 18 MG/DL    Creatinine 2.49 (H) 0.6 - 1.3 MG/DL    BUN/Creatinine ratio 11 (L) 12 - 20      GFR est AA 23 (L) >60 ml/min/1.73m2    GFR est non-AA 19 (L) >60 ml/min/1.73m2    Calcium 8.0 (L) 8.5 - 10.1 MG/DL   GLUCOSE, POC    Collection Time: 17  6:30 AM   Result Value Ref Range    Glucose (POC) 104 70 - 110 mg/dL   GLUCOSE, POC    Collection Time: 17 11:31 AM   Result Value Ref Range    Glucose (POC) 115 (H) 70 - 110 mg/dL       Assessment/Plan: Psoas abscess s/p perc drain. Afebrile. Drain output low. Clinically stable. Antibiotics per ID. Will see again Monday. Please call if needed prior.       Signed By:     Clayton Cabrera MD    [facility]  [facility] [place]

## 2017-05-06 NOTE — PROGRESS NOTES
Problem: Mobility Impaired (Adult and Pediatric)  Goal: *Acute Goals and Plan of Care (Insert Text)  STGs to be addressed within 3 days:  1. Bed mobility: Supine to sit to supine MaxAx1 with HR for meals. 2. Activity tolerance: Tolerate EOB > 15 minutes for ADLs. 3. Transfers: SPT-->to chair max/mod A with LRAD for ADLs. 4. Pt demo fair seated balance in preparation for functional transfers. LTGs to be addressed within 7 days:  1. Transfers: SB-->to chair/wc max/mod A for ADLs. 2. Activity tolerance: Tolerated > 1 hr in chair for change of position. 3. Patient Education: Independent with HEP for home safety. PHYSICAL THERAPY TREATMENT     Patient: Amanda Garcia (19 y.o. female)  Date: 5/6/2017  Diagnosis: Acute paraplegia  Acute paraplegia [facility]) Acute paraplegia [facility])       Precautions: Fall, Skin  Chart, physical therapy assessment, plan of care and goals were reviewed. ASSESSMENT:  Pt  Very  Pleasant. Performed PROM  (B)  LE. Passive stretch (B)  Calf. Pt  Was  Able  To  Move  (R) toes. Pt  Sat on  EOB  For  4 min held  Onto bedrail  To maintain  Balance. Progression toward goals:  [ ]      Improving appropriately and progressing toward goals  [X]      Improving slowly and progressing toward goals  [ ]      Not making progress toward goals and plan of care will be adjusted       PLAN:  Patient continues to benefit from skilled intervention to address the above impairments. Continue treatment per established plan of care. Discharge Recommendations:  Naveen Dixon  Further Equipment Recommendations for Discharge:  bedside commode and N/A       G-CODES:             SUBJECTIVE:   Patient stated im  trying.       OBJECTIVE DATA SUMMARY:   Critical Behavior:  Neurologic State: Alert  Orientation Level: Oriented X4  Cognition: Appropriate decision making  Safety/Judgement: Awareness of environment, Fall prevention  Functional Mobility Training:  Bed Mobility:  Max (A)  (B)  LE Balance:  EOB   Mod/MAX  (A)          Therapeutic Exercises:   PROM  (B)  LE  X  15 each  Pain:  Pain Scale 1: Numeric (0 - 10)  Pain Intensity 1: 0   Activity Tolerance:   fair  Please refer to the flowsheet for vital signs taken during this treatment.   After treatment:   [ ] Patient left in no apparent distress sitting up in chair  [X] Patient left in no apparent distress in bed  [X] Call bell left within reach  [ ] Nursing notified  [ ] Caregiver present  [X] Bed alarm activated      Ana Arce PTA   Time Calculation: 30 mins

## 2017-05-07 LAB
ABO + RH BLD: NORMAL
ANION GAP BLD CALC-SCNC: 5 MMOL/L (ref 3–18)
BLD PROD TYP BPU: NORMAL
BLD PROD TYP BPU: NORMAL
BLOOD GROUP ANTIBODIES SERPL: NORMAL
BPU ID: NORMAL
BPU ID: NORMAL
BUN SERPL-MCNC: 26 MG/DL (ref 7–18)
BUN/CREAT SERPL: 11 (ref 12–20)
CALCIUM SERPL-MCNC: 8.2 MG/DL (ref 8.5–10.1)
CALLED TO:,BCALL1: NORMAL
CHLORIDE SERPL-SCNC: 104 MMOL/L (ref 100–108)
CO2 SERPL-SCNC: 29 MMOL/L (ref 21–32)
CREAT SERPL-MCNC: 2.38 MG/DL (ref 0.6–1.3)
CROSSMATCH RESULT,%XM: NORMAL
CROSSMATCH RESULT,%XM: NORMAL
GLUCOSE BLD STRIP.AUTO-MCNC: 142 MG/DL (ref 70–110)
GLUCOSE BLD STRIP.AUTO-MCNC: 143 MG/DL (ref 70–110)
GLUCOSE BLD STRIP.AUTO-MCNC: 159 MG/DL (ref 70–110)
GLUCOSE BLD STRIP.AUTO-MCNC: 93 MG/DL (ref 70–110)
GLUCOSE SERPL-MCNC: 91 MG/DL (ref 74–99)
POTASSIUM SERPL-SCNC: 3.6 MMOL/L (ref 3.5–5.5)
SODIUM SERPL-SCNC: 138 MMOL/L (ref 136–145)
SPECIMEN EXP DATE BLD: NORMAL
STATUS OF UNIT,%ST: NORMAL
STATUS OF UNIT,%ST: NORMAL
UNIT DIVISION, %UDIV: 0
UNIT DIVISION, %UDIV: 0

## 2017-05-07 PROCEDURE — 74011250637 HC RX REV CODE- 250/637: Performed by: INTERNAL MEDICINE

## 2017-05-07 PROCEDURE — 82962 GLUCOSE BLOOD TEST: CPT

## 2017-05-07 PROCEDURE — 74011636637 HC RX REV CODE- 636/637: Performed by: INTERNAL MEDICINE

## 2017-05-07 PROCEDURE — 36415 COLL VENOUS BLD VENIPUNCTURE: CPT | Performed by: INTERNAL MEDICINE

## 2017-05-07 PROCEDURE — 74011636637 HC RX REV CODE- 636/637: Performed by: HOSPITALIST

## 2017-05-07 PROCEDURE — 74011250636 HC RX REV CODE- 250/636: Performed by: INTERNAL MEDICINE

## 2017-05-07 PROCEDURE — 65660000000 HC RM CCU STEPDOWN

## 2017-05-07 PROCEDURE — 74011000258 HC RX REV CODE- 258: Performed by: INTERNAL MEDICINE

## 2017-05-07 PROCEDURE — 80048 BASIC METABOLIC PNL TOTAL CA: CPT | Performed by: INTERNAL MEDICINE

## 2017-05-07 PROCEDURE — 74011250637 HC RX REV CODE- 250/637: Performed by: HOSPITALIST

## 2017-05-07 RX ADMIN — FAMOTIDINE 20 MG: 20 TABLET ORAL at 08:25

## 2017-05-07 RX ADMIN — Medication 2 TABLET: at 17:51

## 2017-05-07 RX ADMIN — DIPHENHYDRAMINE HYDROCHLORIDE 50 MG: 25 CAPSULE ORAL at 08:25

## 2017-05-07 RX ADMIN — OXYCODONE HYDROCHLORIDE AND ACETAMINOPHEN 1 TABLET: 7.5; 325 TABLET ORAL at 13:37

## 2017-05-07 RX ADMIN — GABAPENTIN 400 MG: 400 CAPSULE ORAL at 08:25

## 2017-05-07 RX ADMIN — CEFTRIAXONE SODIUM 2 G: 2 INJECTION, POWDER, FOR SOLUTION INTRAMUSCULAR; INTRAVENOUS at 11:34

## 2017-05-07 RX ADMIN — DOCUSATE SODIUM 100 MG: 100 CAPSULE, LIQUID FILLED ORAL at 08:25

## 2017-05-07 RX ADMIN — PRAVASTATIN SODIUM 40 MG: 20 TABLET ORAL at 22:08

## 2017-05-07 RX ADMIN — ALLOPURINOL 100 MG: 100 TABLET ORAL at 08:25

## 2017-05-07 RX ADMIN — FOLIC ACID 1 MG: 1 TABLET ORAL at 08:25

## 2017-05-07 RX ADMIN — INSULIN LISPRO 3 UNITS: 100 INJECTION, SOLUTION INTRAVENOUS; SUBCUTANEOUS at 16:48

## 2017-05-07 RX ADMIN — CLOTRIMAZOLE 10 MG: 10 LOZENGE ORAL at 07:49

## 2017-05-07 RX ADMIN — INSULIN GLARGINE 20 UNITS: 100 INJECTION, SOLUTION SUBCUTANEOUS at 08:26

## 2017-05-07 RX ADMIN — CHOLECALCIFEROL TAB 25 MCG (1000 UNIT) 2000 UNITS: 25 TAB at 08:26

## 2017-05-07 RX ADMIN — GABAPENTIN 400 MG: 400 CAPSULE ORAL at 17:51

## 2017-05-07 NOTE — ROUTINE PROCESS
Bedside and Verbal shift change report given to Jack Manley RN (oncoming nurse) by Amy Mas RN (offgoing nurse). Report included the following information SBAR.

## 2017-05-07 NOTE — PROGRESS NOTES
RENAL DAILY PROGRESS NOTE    Impression & Plan:   IMPRESSION:   · Acute kidney injury, recent episode last week, multiple risk factor ,urinary retention, ( high suspicion ) , poor po intake over last few weeks, ongoing sepsis, ? Septic emboli, Medication toxicity such as was on gentamycin, ? Allergic reaction from ceftriaxone--- renal function started to improve, good urine output  · Urinary retention, ? nurogenic bladder,   · Right leg weakness  · Heart block, S/p AICD placement   PLAN:   · Hopefully renal function continue to recover. She will need perez for now as suspect neurogenic bladder. · Adjust all meds per current renal functions status. · Will follow periphery , available if any question or concern. Subjective:       Complaint:     Overnight events noted  Feels okay   no nausea, vomiting, chest pain, short of breath, cough, seizure.      Current Facility-Administered Medications   Medication Dose Route Frequency    0.9% sodium chloride infusion 250 mL  250 mL IntraVENous PRN    0.9% sodium chloride infusion 250 mL  250 mL IntraVENous PRN    famotidine (PEPCID) tablet 20 mg  20 mg Oral DAILY    insulin lispro (HUMALOG) injection   SubCUTAneous AC&HS    0.9% sodium chloride infusion 250 mL  250 mL IntraVENous PRN    cefTRIAXone (ROCEPHIN) 2 g in 0.9% sodium chloride (MBP/ADV) 50 mL MBP  2 g IntraVENous Q24H    insulin glargine (LANTUS) injection 20 Units  20 Units SubCUTAneous DAILY    diphenhydrAMINE (BENADRYL) capsule 50 mg  50 mg Oral Q4H PRN    polyethylene glycol (MIRALAX) packet 17 g  17 g Oral TID    cholecalciferol (VITAMIN D3) tablet 2,000 Units  2,000 Units Oral DAILY    docusate sodium (COLACE) capsule 100 mg  100 mg Oral DAILY AFTER BREAKFAST    gabapentin (NEURONTIN) capsule 400 mg  400 mg Oral BID    oxyCODONE-acetaminophen (PERCOCET 7.5) 7.5-325 mg per tablet 1 Tab  1 Tab Oral Q4H PRN    pravastatin (PRAVACHOL) tablet 40 mg  40 mg Oral QHS    senna-docusate (PERICOLACE) 8.6-50 mg per tablet 2 Tab  2 Tab Oral PCD    acetaminophen (TYLENOL) tablet 500 mg  500 mg Oral Q6H PRN    allopurinol (ZYLOPRIM) tablet 100 mg  100 mg Oral DAILY    clotrimazole (MYCELEX) frances 10 mg  10 mg Oral 5XD    folic acid (FOLVITE) tablet 1 mg  1 mg Oral DAILY    ondansetron (ZOFRAN) injection 4 mg  4 mg IntraVENous Q8H PRN    glucose chewable tablet 16 g  16 g Oral PRN    glucagon (GLUCAGEN) injection 1 mg  1 mg IntraMUSCular PRN    dextrose (D50W) injection syrg 12.5-25 g  25-50 mL IntraVENous PRN       Review of Symptoms: comprehensive ROS negative except above. Objective:     Patient Vitals for the past 24 hrs:   Temp Pulse Resp BP SpO2   05/07/17 0800 98.1 °F (36.7 °C) 77 18 123/63 99 %   05/07/17 0400 98.5 °F (36.9 °C) 72 18 103/58 96 %   05/07/17 0000 98.1 °F (36.7 °C) 79 18 97/58 96 %   05/06/17 2000 99.7 °F (37.6 °C) 83 18 117/60 93 %   05/06/17 1600 98.3 °F (36.8 °C) 84 17 124/64 100 %   05/06/17 1200 98.6 °F (37 °C) 75 17 134/64 99 %        Weight change: -1.27 kg (-2 lb 12.8 oz)     05/05 1901 - 05/07 0700  In: 670 [P.O.:120;  I.V.:550]  Out: 4610 [Urine:4500; Drains:110]    Intake/Output Summary (Last 24 hours) at 05/07/17 1129  Last data filed at 05/07/17 0400   Gross per 24 hour   Intake                0 ml   Output             2400 ml   Net            -2400 ml     Physical Exam:   General: comfortable, no acute distress   HEENT sclera anicteric, supple neck, no thyromegaly  CVS: S1S2 heard, no rub  RS: + air entry b/l,   Abd: Soft, Non tender,  Neuro: awake  Extrm: + edema, no cyanosis, clubbing   Skin: no visible Rash  Musculoskeletal: No gross joints or bone deformities            Data Review:     LABS:   Hematology:   Recent Labs      05/05/17   1952  05/05/17   0411  05/04/17   1330   WBC   --   5.7  5.8   HGB  7.4*  6.5*  7.5*   HCT  22.4*  19.7*  22.5*     Chemistry:   Recent Labs      05/07/17 0231  05/06/17   0555  05/05/17 0411   BUN  26*  28*  31*   CREA 2.38*  2.49*  2.80*   CA  8.2*  8.0*  8.1*   K  3.6  3.5  3.7   NA  138  135*  136   CL  104  104  104   CO2  29  23  25   GLU  91  128*  79              Procedures/imaging: see electronic medical records for all procedures, Xrays and details which were not copied into this note but were reviewed prior to creation of Plan          Assessment & Plan:     As above         Jimmy Spurling, MD  5/7/2017  3:15 PM

## 2017-05-07 NOTE — ROUTINE PROCESS
Bedside shift change report given to Jet Jimenez (oncoming nurse) by Alba Traore (offgoing nurse). Report included the following information SBAR, Intake/Output, MAR, Recent Results and Cardiac Rhythm . Chad Golden

## 2017-05-08 LAB
BASOPHILS # BLD AUTO: 0 K/UL (ref 0–0.06)
BASOPHILS # BLD: 1 % (ref 0–2)
DIFFERENTIAL METHOD BLD: ABNORMAL
EOSINOPHIL # BLD: 0.2 K/UL (ref 0–0.4)
EOSINOPHIL NFR BLD: 3 % (ref 0–5)
ERYTHROCYTE [DISTWIDTH] IN BLOOD BY AUTOMATED COUNT: 17 % (ref 11.6–14.5)
GLUCOSE BLD STRIP.AUTO-MCNC: 114 MG/DL (ref 70–110)
GLUCOSE BLD STRIP.AUTO-MCNC: 129 MG/DL (ref 70–110)
GLUCOSE BLD STRIP.AUTO-MCNC: 167 MG/DL (ref 70–110)
GLUCOSE BLD STRIP.AUTO-MCNC: 186 MG/DL (ref 70–110)
GLUCOSE BLD STRIP.AUTO-MCNC: 84 MG/DL (ref 70–110)
HCT VFR BLD AUTO: 28.8 % (ref 35–45)
HGB BLD-MCNC: 9.2 G/DL (ref 12–16)
LYMPHOCYTES # BLD AUTO: 31 % (ref 21–52)
LYMPHOCYTES # BLD: 2.3 K/UL (ref 0.9–3.6)
MCH RBC QN AUTO: 26.3 PG (ref 24–34)
MCHC RBC AUTO-ENTMCNC: 31.9 G/DL (ref 31–37)
MCV RBC AUTO: 82.3 FL (ref 74–97)
MONOCYTES # BLD: 0.7 K/UL (ref 0.05–1.2)
MONOCYTES NFR BLD AUTO: 9 % (ref 3–10)
NEUTS SEG # BLD: 4.2 K/UL (ref 1.8–8)
NEUTS SEG NFR BLD AUTO: 56 % (ref 40–73)
PLATELET # BLD AUTO: 151 K/UL (ref 135–420)
PMV BLD AUTO: 8.7 FL (ref 9.2–11.8)
RBC # BLD AUTO: 3.5 M/UL (ref 4.2–5.3)
WBC # BLD AUTO: 7.4 K/UL (ref 4.6–13.2)

## 2017-05-08 PROCEDURE — 74011000258 HC RX REV CODE- 258: Performed by: INTERNAL MEDICINE

## 2017-05-08 PROCEDURE — 97530 THERAPEUTIC ACTIVITIES: CPT

## 2017-05-08 PROCEDURE — 82962 GLUCOSE BLOOD TEST: CPT

## 2017-05-08 PROCEDURE — 74011250637 HC RX REV CODE- 250/637: Performed by: INTERNAL MEDICINE

## 2017-05-08 PROCEDURE — 74011250636 HC RX REV CODE- 250/636: Performed by: INTERNAL MEDICINE

## 2017-05-08 PROCEDURE — 36415 COLL VENOUS BLD VENIPUNCTURE: CPT | Performed by: INTERNAL MEDICINE

## 2017-05-08 PROCEDURE — 65660000000 HC RM CCU STEPDOWN

## 2017-05-08 PROCEDURE — 74011636637 HC RX REV CODE- 636/637: Performed by: HOSPITALIST

## 2017-05-08 PROCEDURE — 97164 PT RE-EVAL EST PLAN CARE: CPT

## 2017-05-08 PROCEDURE — 74011250637 HC RX REV CODE- 250/637: Performed by: HOSPITALIST

## 2017-05-08 PROCEDURE — 85025 COMPLETE CBC W/AUTO DIFF WBC: CPT | Performed by: INTERNAL MEDICINE

## 2017-05-08 PROCEDURE — 74011636637 HC RX REV CODE- 636/637: Performed by: INTERNAL MEDICINE

## 2017-05-08 RX ADMIN — INSULIN LISPRO 3 UNITS: 100 INJECTION, SOLUTION INTRAVENOUS; SUBCUTANEOUS at 21:26

## 2017-05-08 RX ADMIN — GABAPENTIN 400 MG: 400 CAPSULE ORAL at 08:40

## 2017-05-08 RX ADMIN — GABAPENTIN 400 MG: 400 CAPSULE ORAL at 17:01

## 2017-05-08 RX ADMIN — DIPHENHYDRAMINE HYDROCHLORIDE 50 MG: 25 CAPSULE ORAL at 10:47

## 2017-05-08 RX ADMIN — OXYCODONE HYDROCHLORIDE AND ACETAMINOPHEN 1 TABLET: 7.5; 325 TABLET ORAL at 20:18

## 2017-05-08 RX ADMIN — CLOTRIMAZOLE 10 MG: 10 LOZENGE ORAL at 08:40

## 2017-05-08 RX ADMIN — CEFTRIAXONE SODIUM 2 G: 2 INJECTION, POWDER, FOR SOLUTION INTRAMUSCULAR; INTRAVENOUS at 10:48

## 2017-05-08 RX ADMIN — FAMOTIDINE 20 MG: 20 TABLET ORAL at 08:40

## 2017-05-08 RX ADMIN — OXYCODONE HYDROCHLORIDE AND ACETAMINOPHEN 1 TABLET: 7.5; 325 TABLET ORAL at 08:40

## 2017-05-08 RX ADMIN — DOCUSATE SODIUM 100 MG: 100 CAPSULE, LIQUID FILLED ORAL at 08:40

## 2017-05-08 RX ADMIN — FOLIC ACID 1 MG: 1 TABLET ORAL at 08:40

## 2017-05-08 RX ADMIN — ALLOPURINOL 100 MG: 100 TABLET ORAL at 08:40

## 2017-05-08 RX ADMIN — PRAVASTATIN SODIUM 40 MG: 20 TABLET ORAL at 21:24

## 2017-05-08 RX ADMIN — Medication 2 TABLET: at 17:01

## 2017-05-08 RX ADMIN — INSULIN GLARGINE 20 UNITS: 100 INJECTION, SOLUTION SUBCUTANEOUS at 08:47

## 2017-05-08 RX ADMIN — CLOTRIMAZOLE 10 MG: 10 LOZENGE ORAL at 21:24

## 2017-05-08 RX ADMIN — CHOLECALCIFEROL TAB 25 MCG (1000 UNIT) 2000 UNITS: 25 TAB at 08:40

## 2017-05-08 NOTE — PROGRESS NOTES
Infectious Disease Progress Note    Requested by: Dr. Tracey Coleman, dr. Adrianna Fields    Reason: sepsis, acute paraplegia    Current abx Prior abx   Ceftriaxone since 4/24 Cefepime 4/20-4/21  vancomycin  4/20-4/24  Gentamicin 4/21-4/24  Metronidazole  4/20-4/24     Lines:   PICC RUE 4/26    Assessment :    77 y.o., right handed female, with an established history of hypertension, complete heart block with permanent pacemaker placed, diabetes mellitus, diabetic peripheral neuropathy, obesity, admitted to [facility] on 4/20/17. Clinical presentation consistent with  Sepsis (POA)  secondary to group B streptococcus bloodstream infection (positive blood cultures 4/20, negative blood cultures 4/21). Most likely source of bloodstream infection is infected right psoas hematoma/abscess. 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 No signs of neurological recovery on today's exam     2 week h/o pain at the site of abdominal pacemaker, tenderness at the site - however, patient doesn't have erythema at the site of pacemaker. No fluid collection noted at pacemaker pocket site per usg 4/24. Quick clearance of bacteremia would argue against endocarditis/endovascular infection. At this time risk of removal of pacemaker/general pocket change exceed the benefit. Discussed with cardiology. Would recommend close monitoring. Abdominal usg 4/24 doesn't reveal evidence of abscess/fluid collection at the site of pacemaker pocket. Enterococcus in urine cultures 4/20 likely colonizer    Acute renal failure: nephrology consult appreciated. Difficult to determine exact etiology of ARF at this time. Slight improvement in creatinine today. Low grade fever overnight - Ct scan 5/4 reveals increasing focal area within the right psoas muscle inferior to the previously drained area.  could represent either spread of infection inferiorly or intramuscular hematoma. Decreased drainage from the right psoas abscess. Monitor for partially drained abscess    No evidence of pneumonia    Decreasing h/h- ?low grade fevers due to unidentified bleeding, blood transfusion - no evidence of new infection    Recommendations:    1. cont ceftriaxone till 6/4/17  2. Drain check by IR in am if drainage less than 10 cc    Above plan was discussed in details with patient, dr. Bere Rebolledo. Please call me if any further questions or concerns. Will continue to participate in the care of this patient. subjective:    No increased right flank pain. Denies chills. Denies cp, sob. Unable to move legs. decreased pain at the site of pacemaker left abdomen. No nausea, vomiting. No new rash/itching/joint pain/back pain. Home Medication List    Details   gabapentin (NEURONTIN) 400 mg capsule Take 1 Cap by mouth two (2) times a day. Indications: NEUROPATHIC PAIN  Qty: 30 Cap, Refills: 0    Associated Diagnoses: Iliopsoas muscle hematoma, right, subsequent encounter      cholecalciferol (VITAMIN D3) 1,000 unit tablet Take 2 Tabs by mouth daily. Indications: PREVENTION OF VITAMIN D DEFICIENCY  Qty: 30 Tab, Refills: 0      SITagliptin (JANUVIA) 50 mg tablet Take 50 mg by mouth daily. Indications: type 2 diabetes mellitus      potassium chloride (KLOR-CON M20) 20 mEq tablet Take 20 mEq by mouth two (2) times a day. Indications: HYPOKALEMIA PREVENTION      ondansetron (ZOFRAN ODT) 4 mg disintegrating tablet Take 4 mg by mouth every eight (8) hours as needed for Nausea. cyclobenzaprine (FLEXERIL) 10 mg tablet Take 10 mg by mouth as needed for Muscle Spasm(s). Indications: MUSCLE SPASM      carvedilol (COREG) 6.25 mg tablet Take 1 Tab by mouth two (2) times daily (with meals).  Indications: hypertension  Qty: 30 Tab, Refills: 0    Associated Diagnoses: Benign hypertensive heart disease with systolic CHF, NYHA class 2 (HCC)      acetaminophen (TYLENOL) 325 mg tablet Take 2 Tabs by mouth every four (4) hours as needed (for fever or pain level less than 5/10). Indications: Fever, Pain  Qty: 30 Tab, Refills: 0    Associated Diagnoses: Iliopsoas muscle hematoma, right, subsequent encounter      allopurinol (ZYLOPRIM) 100 mg tablet Take 1 Tab by mouth daily. Indications: GOUT  Qty: 15 Tab, Refills: 0    Associated Diagnoses: Chronic gout, unspecified cause, unspecified site      insulin glargine (LANTUS) 100 unit/mL injection 15 Units by SubCUTAneous route nightly. Indications: type 2 diabetes mellitus  Qty: 1 Vial, Refills: 0    Associated Diagnoses: Type 2 diabetes mellitus with diabetic neuropathy, with long-term current use of insulin (HCC)      docusate sodium (COLACE) 100 mg capsule Take 1 Cap by mouth daily (after breakfast). Indications: Constipation  Qty: 15 Cap, Refills: 0      senna-docusate (PERICOLACE) 8.6-50 mg per tablet Take 2 Tabs by mouth daily (after dinner). Indications: Constipation  Qty: 30 Tab, Refills: 0      oxyCODONE-acetaminophen (PERCOCET 7.5) 7.5-325 mg per tablet Take 1 Tab by mouth every four (4) hours as needed (for pain level greater than 4/10). Max Daily Amount: 6 Tabs. Indications: Pain  Qty: 50 Tab, Refills: 0    Associated Diagnoses: Iliopsoas muscle hematoma, right, subsequent encounter      bumetanide (BUMEX) 1 mg tablet TAKE 1 TABLET TWICE A DAY  Qty: 180 Tab, Refills: 1      pravastatin (PRAVACHOL) 40 mg tablet Take 40 mg by mouth nightly. Indications: DYSLIPIDEMIA      ferrous sulfate 325 mg (65 mg iron) tablet Take 1 Tab by mouth two (2) times daily (with meals).   Qty: 30 Tab, Refills: 0      insulin aspart (NOVOLOG) 100 unit/mL injection INITIATE INSULIN CORRECTIVE PROTOCOL (HR): Normal Insulin Sensitivity  For Blood Sugar (mg/dL) of:    Less than 150 =   0 units          150 -199 =   2 units 200 -249 =   4 units 250 -299 =   6 units 300 -349 =   8 units 350 and above =   10 units If 2 glucose readings are above 200 mg/dL  Qty: 10 mL, Refills: 6             Current Facility-Administered Medications   Medication Dose Route Frequency    0.9% sodium chloride infusion 250 mL  250 mL IntraVENous PRN    0.9% sodium chloride infusion 250 mL  250 mL IntraVENous PRN    famotidine (PEPCID) tablet 20 mg  20 mg Oral DAILY    insulin lispro (HUMALOG) injection   SubCUTAneous AC&HS    0.9% sodium chloride infusion 250 mL  250 mL IntraVENous PRN    cefTRIAXone (ROCEPHIN) 2 g in 0.9% sodium chloride (MBP/ADV) 50 mL MBP  2 g IntraVENous Q24H    insulin glargine (LANTUS) injection 20 Units  20 Units SubCUTAneous DAILY    diphenhydrAMINE (BENADRYL) capsule 50 mg  50 mg Oral Q4H PRN    polyethylene glycol (MIRALAX) packet 17 g  17 g Oral TID    cholecalciferol (VITAMIN D3) tablet 2,000 Units  2,000 Units Oral DAILY    docusate sodium (COLACE) capsule 100 mg  100 mg Oral DAILY AFTER BREAKFAST    gabapentin (NEURONTIN) capsule 400 mg  400 mg Oral BID    oxyCODONE-acetaminophen (PERCOCET 7.5) 7.5-325 mg per tablet 1 Tab  1 Tab Oral Q4H PRN    pravastatin (PRAVACHOL) tablet 40 mg  40 mg Oral QHS    senna-docusate (PERICOLACE) 8.6-50 mg per tablet 2 Tab  2 Tab Oral PCD    acetaminophen (TYLENOL) tablet 500 mg  500 mg Oral Q6H PRN    allopurinol (ZYLOPRIM) tablet 100 mg  100 mg Oral DAILY    clotrimazole (MYCELEX) frances 10 mg  10 mg Oral 5XD    folic acid (FOLVITE) tablet 1 mg  1 mg Oral DAILY    ondansetron (ZOFRAN) injection 4 mg  4 mg IntraVENous Q8H PRN    glucose chewable tablet 16 g  16 g Oral PRN    glucagon (GLUCAGEN) injection 1 mg  1 mg IntraMUSCular PRN    dextrose (D50W) injection syrg 12.5-25 g  25-50 mL IntraVENous PRN       Allergies: Vancomycin; Ampicillin; Bactrim [sulfamethoxazole-trimethoprim]; Blueberry; Ciprofloxacin; Codeine; Crestor [rosuvastatin]; Darvocet a500 [propoxyphene n-acetaminophen]; Demerol [meperidine]; Levaquin [levofloxacin]; Lipitor [atorvastatin]; Magnesium oxide; Minocin [minocycline];  Pcn [penicillins]; Pravachol [pravastatin]; Sulfa (sulfonamide antibiotics); Ultracet [tramadol-acetaminophen]; Vicodin [hydrocodone-acetaminophen]; Vytorin 10-10 [ezetimibe-simvastatin]; and Percodan [oxycodone hcl-oxycodone-asa]    Temp (24hrs), Av.1 °F (37.3 °C), Min:98.3 °F (36.8 °C), Max:100.2 °F (37.9 °C)    Visit Vitals    BP 98/58 (BP 1 Location: Right arm, BP Patient Position: At rest)    Pulse 88    Temp 98.7 °F (37.1 °C)    Resp 18    Ht 5' 7\" (1.702 m)    Wt 114.2 kg (251 lb 11.7 oz)    SpO2 95%    Breastfeeding No    BMI 39.43 kg/m2       ROS: patient unable to communicate fluently. Detailed ros not feasible. Physical Exam:    General: Well developed, well nourished female laying on the bed AAOx3 NAD    General:   awake alert and oriented   HEENT:  Normocephalic, atraumatic, PERRL, EOMI, no scleral icterus or pallor; no conjunctival hemmohage;  nasal and oral mucous are moist and without evidence of lesions. Neck supple, no bruits. Lymph Nodes:   no cervical, axillary or inguinal adenopathy   Lungs:   non-labored, bilaterally clear to auscultation- no crackles wheezes rales or rhonchi   Heart:   s1 and s2 irregular; no rubs or gallops, no edema, + pedal pulses   Abdomen:  soft, non-distended, active bowel sounds, no hepatomegaly, no splenomegaly. no tenderness over the left abdominal pacemaker, no overlying erythema or fluctuance   Genitourinary:  deferred   Extremities:   no clubbing, cyanosis; no joint effusions or swelling; muscle mass appropriate for age   Neurologic:  No gross focal sensory abnormalities; 5/5 muscle strength to upper extremities. 0/5 strength in lower extremities.   Cranial nerves intact                        Skin:  Surgical scars abdomen, left neck well healed   Back:  right flank drain with purulent drainage,  no paraspinal muscle guarding or rigidity, right CVA tenderness     Psychiatric:  No suicidal or homicidal ideations, appropriate mood and affect Labs: Results:   Chemistry Recent Labs      05/07/17   0231  05/06/17   0555   GLU  91  128*   NA  138  135*   K  3.6  3.5   CL  104  104   CO2  29  23   BUN  26*  28*   CREA  2.38*  2.49*   CA  8.2*  8.0*   AGAP  5  8   BUCR  11*  11*      CBC w/Diff Recent Labs      05/05/17 1952   HGB  7.4*   HCT  22.4*      Microbiology No results for input(s): CULT in the last 72 hours.        RADIOLOGY:    All available imaging studies/reports in [facility] for this admission were reviewed    Dr. Elham Loera, Infectious Disease Specialist  [phone]  May 8, 2017  3:49 PM

## 2017-05-08 NOTE — PROGRESS NOTES
Problem: Mobility Impaired (Adult and Pediatric)  Goal: *Acute Goals and Plan of Care (Insert Text)  STGs to be addressed within 3 days:  1. Bed mobility: Supine to sit to supine MaxAx1 with HR for meals. 2. Activity tolerance: Tolerate EOB > 15 minutes for ADLs. 3. Transfers: SPT-->to chair max/mod A with LRAD for ADLs. 4. Pt demo fair seated balance in preparation for functional transfers. LTGs to be addressed within 7 days:  1. Transfers: SB-->to chair/wc max/mod A for ADLs. 2. Activity tolerance: Tolerated > 1 hr in chair for change of position. 3. Patient Education: Independent with HEP for home safety. PHYSICAL THERAPY RE-EVALUATION AND TREATMENT     Patient: Nu Beuno (6 y.o. female)  Date: 5/8/2017  Diagnosis: Acute paraplegia  Acute paraplegia ([facility] [address] Acute paraplegia [facility])       Precautions: Fall, Skin      ASSESSMENT:  Pt presents today alert and agreeable to therapy. Pt participated in rolling with BUE's extended overhead with cueing to use momentum. Pt required VC's for turning her head in the direction of the roll as well as breathing techniques as pt tends to valsalva. Pt demonstrates improvements in sensation including LT, pressure, and pain and pt was able to demonstrate very minimal motor return inconsistently in MTP and ankle joints. Pt declined attempt to sit at EOB due to fatigue and was left resting in bed with call bell by her side and denied further need for assistance at this time. Pt will continue to benefit from therapy. Patient's progression toward goals since last assessment: Progressing slowly; all goals ongoing       PLAN:  Goals have been updated based on progression since last assessment. Patient continues to benefit from skilled intervention to address the above impairments. Continue to follow the patient 1-2 times per day/4-7 days per week to address goals.   Planned Interventions:  [X]     Bed Mobility Training          [X]     Neuromuscular Re-Education  [X] Transfer Training                [ ]    Orthotic/Prosthetic Training  [X]     Gait Training                       [ ]     Modalities  [X]     Therapeutic Exercises       [ ]     Edema Management/Control  [X]     Therapeutic Activities         [X]     Patient and Family Training/Education  [ ]     Other (comment):  Discharge Recommendations: Rehab ([facility])  Further Equipment Recommendations for Discharge: N/A       G-CODES:      Mobility [id number] Current  CL= 60-79%  [id number] Goal  CK= 40-59%. The severity rating is based on the Level of Assistance required for Functional Mobility and ADLs. SUBJECTIVE:   Patient stated I feel tired. I wish I could do more but it does take a lot of energy.       OBJECTIVE DATA SUMMARY:   Critical Behavior:  Neurologic State: Alert  Orientation Level: Oriented X4  Cognition: Follows commands  Safety/Judgement: Awareness of environment, Fall prevention  Functional Mobility Training:  Bed Mobility:  Rolling: Maximum assistance   both directions x3 attempts with VC's for breathing pattern matching exertion as well as rotating head in direction of rolling     Therapeutic Exercises/Neuro-Re Education:   Attempted DF/PF bilaterally with visualization and PT facilitation on tibialis anterior  Performed x2 each side  Pt also instructed to perform AP's with visualization during the day  Pain:     4/10 pre and post (pt had pain meds this AM)     Activity Tolerance:   Pt tolerated activity well with no c/o chest pain, SOB, or dizziness; pt does report soreness in her abdomen and fatigue after session. Please refer to the flowsheet for vital signs taken during this treatment.   After treatment:   [ ]  Patient left in no apparent distress sitting up in chair  [X]  Patient left in no apparent distress in bed  [X]  Call bell left within reach  [ ]  Nursing notified  [ ]  Caregiver present  [ ]  Bed alarm activated     Nanette Rodriguez, PT   Time Calculation: 28 mins

## 2017-05-08 NOTE — PROGRESS NOTES
Progress Note    Patient: Carol Hu MRN: [id number]  SSN: [id number]    YOB: 1950  Age: 77 y.o.   Sex: female      Admit Date: 4/20/2017    LOS: 18 days     Subjective:     NO NEW C/O  LOW GRADE FEVER  NO DRAINAGE    Objective:     Vitals:    05/08/17 0000 05/08/17 0400 05/08/17 0510 05/08/17 0830   BP: 106/57 108/56  98/58   Pulse: 84 78  88   Resp: 20 18 18   Temp: 100.2 °F (37.9 °C) 98.6 °F (37 °C)  98.7 °F (37.1 °C)   TempSrc:       SpO2: 95% 93%  95%   Weight:   251 lb 11.7 oz (114.2 kg)    Height:            Intake and Output:  Current Shift:    Last three shifts: 05/06 1901 - 05/08 0700  In: -   Out: 3357.5 [Urine:3350; Drains:7.5]    Current Facility-Administered Medications   Medication Dose Route Frequency    0.9% sodium chloride infusion 250 mL  250 mL IntraVENous PRN    0.9% sodium chloride infusion 250 mL  250 mL IntraVENous PRN    famotidine (PEPCID) tablet 20 mg  20 mg Oral DAILY    insulin lispro (HUMALOG) injection   SubCUTAneous AC&HS    0.9% sodium chloride infusion 250 mL  250 mL IntraVENous PRN    cefTRIAXone (ROCEPHIN) 2 g in 0.9% sodium chloride (MBP/ADV) 50 mL MBP  2 g IntraVENous Q24H    insulin glargine (LANTUS) injection 20 Units  20 Units SubCUTAneous DAILY    diphenhydrAMINE (BENADRYL) capsule 50 mg  50 mg Oral Q4H PRN    polyethylene glycol (MIRALAX) packet 17 g  17 g Oral TID    cholecalciferol (VITAMIN D3) tablet 2,000 Units  2,000 Units Oral DAILY    docusate sodium (COLACE) capsule 100 mg  100 mg Oral DAILY AFTER BREAKFAST    gabapentin (NEURONTIN) capsule 400 mg  400 mg Oral BID    oxyCODONE-acetaminophen (PERCOCET 7.5) 7.5-325 mg per tablet 1 Tab  1 Tab Oral Q4H PRN    pravastatin (PRAVACHOL) tablet 40 mg  40 mg Oral QHS    senna-docusate (PERICOLACE) 8.6-50 mg per tablet 2 Tab  2 Tab Oral PCD    acetaminophen (TYLENOL) tablet 500 mg  500 mg Oral Q6H PRN    allopurinol (ZYLOPRIM) tablet 100 mg  100 mg Oral DAILY    clotrimazole (MYCELEX) frances 10 mg  10 mg Oral 5XD    folic acid (FOLVITE) tablet 1 mg  1 mg Oral DAILY    ondansetron (ZOFRAN) injection 4 mg  4 mg IntraVENous Q8H PRN    glucose chewable tablet 16 g  16 g Oral PRN    glucagon (GLUCAGEN) injection 1 mg  1 mg IntraMUSCular PRN    dextrose (D50W) injection syrg 12.5-25 g  25-50 mL IntraVENous PRN         Physical Exam:   GENERAL: alert, cooperative, no distress, appears stated age  ABDOMEN: soft, non-tender. Bowel sounds normal. No masses,  no organomegaly  FLANK:  NON  TENDER      Lab/Data Review:  BMP: No results found for: NA, K, CL, CO2, AGAP, GLU, BUN, CREA, GFRAA, GFRNA  CMP: No results found for: NA, K, CL, CO2, AGAP, GLU, BUN, CREA, GFRAA, GFRNA, CA, MG, PHOS, ALB, TBIL, TP, ALB, GLOB, AGRAT, SGOT, ALT, GPT  CBC: No results found for: WBC, HGB, HGBEXT, HCT, HCTEXT, PLT, PLTEXT, HGBEXT, HCTEXT, PLTEXT       CT Results:    Results from Hospital Encounter encounter on 04/20/17   CT ABD PELV WO CONT   Narrative CT Abdomen And Pelvis without Intravenous Contrast    INDICATION: Generalized abdominal pain. Follow-up psoas hematoma/abscess. Now  with dropping hemoglobin. TECHNIQUE: 5 mm collimation axial images obtained from the diaphragm to the  level of the pubic symphysis without administration of low osmolar, nonionic  intravenous contrast.    Dose reduction techniques used: Automated exposure control, adjustment of the  mAs and/or kVp according to patient size, standardized low-dose protocol, and/or  iterative reconstruction technique. COMPARISON: April 26, 2017. ABDOMEN FINDINGS:    Lung Bases: There are small bilateral pleural effusions, left greater than  right. These are similar in size to prior. There is associated atelectasis. Heart is mildly enlarged in size. Liver: Normal attenuation. Posterior subcapsular calcifications are unchanged. No definite new mass. Gallbladder: Status post cholecystectomy. No biliary ductal dilatation. Pancreas:  The pancreas is unchanged. Spleen: The spleen is mildly enlarged in size. Stable calcification band in the  posterior aspect. The spleen measures 14.7 cm. Adrenal Glands: No evidence for mass. Kidneys:     Right: Mild cortical thinning. Hypodensity cortical lesion is stable. No  hydronephrosis. Left:  Nonspecific bilateral perinephric stranding. Exophytic lower pole cyst  is stable. No hydronephrosis. Possible nonobstructive renal calculus versus  vascular calcification. Lymph Nodes: No lymphadenopathy. Aorta:   Normal in caliber. Scattered atherosclerotic disease of aorta. Percutaneous pigtail drainage catheter in the right psoas muscle is unchanged in  position. The right psoas muscle remains enlarged. At the level of the pigtail  catheter, the psoas muscle measures 4.3 cm x 4.0 cm, previously 4.4 cm x 3.9 cm  by similar measurements. More inferiorly, there is increasing low density  measuring approximately 2.1 cm x 2.2 cm in the right psoas muscle. This area  measures complex density. Heterogeneity in the right psoas muscle anterior to the right hip is seen, but  not definitively changed from prior    PELVIS FINDINGS:     Bowel: Small Bowel: Normal in caliber with normal wall thickness. Large Bowel: Moderate stool burden without evidence of obstruction or  inflammation. Appendix: Normal appendix. Bladder: Collapsed with Krueger catheter in place. The uterus is surgically absent. No suspicious adnexal mass. Stable calcified  left adnexal mass. There is slightly increased confluent presacral edema which measures mildly  complex density. There is diffuse anasarca. Bones: There are degenerative changes of the spine. There is no suspicious  osseous lesion. .         Impression IMPRESSION:    Increasing focal area within the right psoas muscle inferior to the previously  drained area.  This measures mildly complex density and could represent either  spread of infection inferiorly or intramuscular hematoma. Differentiation is not  possible without intravenous contrast.    Mild heterogeneity in the right psoas muscle anterior to the right hip is seen  but incompletely assessed without intravenous contrast.    Slightly increasing presacral edema/fluid, some of which measures mildly complex  density. A component of blood products cannot be excluded. Ongoing diffuse anasarca with subcutaneous edema and small pleural effusions. Extensive bibasilar atelectasis with or without infiltrates. Remainder is stable in the short interval. Please see above for complete  details. US Results:    Results from [place] encounter on 04/20/17   US ABD LTD   Narrative ULTRASOUND ABDOMEN-LIMITED    INDICATION: Evaluation for fluid collection/abscess at the site of pacemaker  pocket    COMPARISON: Correlation with CT abdomen/pelvis 4/19/2017    TECHNIQUE: Ultrasound evaluation was performed of the left upper quadrant  abdomen in the area of pacemaker. Selected static images are submitted for  review. FINDINGS:  In the area of concern, there is no evidence for focal fluid collection/abscess. Impression IMPRESSION:  As above.               Assessment:     Principal Problem:    Acute paraplegia ([address] (4/20/2017)    Active Problems:    Type 2 diabetes mellitus with diabetic neuropathy ([address] (6/28/2011)      AICD generator infection ([address] (8/20/2012)      Overview: S/p explant 4 leads 8/20/12      Benign hypertensive heart disease with systolic CHF, NYHA class 2 ([address] (9/5/2012)      Decreased calculated glomerular filtration rate (GFR) (3/30/2017)      Overview: Calculated GFR equivalent to that of CKD stage 3 = 30-59 ml/min      Iliopsoas muscle hematoma (3/30/2017)      Psoas hematoma, right, secondary to anticoagulant therapy (3/30/2017)      Impaired mobility and ADLs (3/30/2017)      History of Coumadin therapy ()      Overview: Anticoagulation for chronic atrial fibrillation; Discontinued on 3/30/2017      Sepsis ([facility] [address] (4/20/2017)      Psoas abscess, right ([address] (4/20/2017)      Krueger catheter in place on admission (4/20/2017)      Urinary tract infection due to Enterococcus (4/20/2017)      Group B streptococcal infection (4/20/2017)        Plan:     WOULD SUGGEST  REPEAT  DRY CT TO SEE IF  HEMATOMA  AND/OR ABSCESS IS RESOLVED  REPEAT CBC  SX'ICALLY IMPROVED    Signed By: Jordan Borrego MD , FACS    May 8, 2017      PAGER:  [phone]  CELL:  [facility]  OFFICE:  [address] [place] [address] [phone]

## 2017-05-08 NOTE — ROUTINE PROCESS
Bedside shift change report given to Golden De Los Santos (oncoming nurse) by Yvette Vega (offgoing nurse). Report included the following information SBAR, Intake/Output, MAR, Recent Results and Cardiac Rhythm . Tony Robison

## 2017-05-08 NOTE — PROGRESS NOTES
SUBJECTIVE:    C/o abdominal pain and feeling cold. No chest pain or SOB or cough. No N/V/D. OBJECTIVE:    Visit Vitals    /71 (BP 1 Location: Left arm, BP Patient Position: At rest)    Pulse 80    Temp 98.2 °F (36.8 °C)    Resp 18    Ht 5' 7\" (1.702 m)    Wt 114.2 kg (251 lb 11.7 oz)    SpO2 95%    Breastfeeding No    BMI 39.43 kg/m2     RS: CTA bilaterally  CVS: RRR  GI: ND, BS +  Extremities: pedal edema  General: NAD    ASSESSMENT:    1. Sepsis secondary to infected right psoas hematoma/epidural abscess with neurological compromise resulting in paraplegia. s/p drain. 2. 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 3. h/o pain at the site of abdominal pacemaker  4. Heart block s/p AICD 2005 with upgrade to BiV later that year, removed however in 2012 due to trauma and infection. New biventricular pacemaker LUQ 9/2012 by Dr. 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 5. Elevated troponin likely demand ischemia in setting of sepsis. 6. H/p transient nonischemic CMY with EF 45%  7. Acute kidney injury due to neurogenic bladder  8. Acute on chronic anemia s/p 2 units  9. Diabetes mellitus. 10.Dyslipidemia. 6. H/o HTN. 12. LBBB. 13. HALI on CPAP. 15. H/o DVT.     PLAN:    Cont antibiotic per ID  Cont current management including perez catheter  Patient is high risk for recurrent sepsis and sudden cardiac death      CMP: No results found for: NA, K, CL, CO2, AGAP, GLU, BUN, CREA, GFRAA, GFRNA, CA, MG, PHOS, ALB, TBIL, TP, ALB, GLOB, AGRAT, SGOT, ALT, GPT     CBC:   Lab Results   Component Value Date/Time    WBC 7.4 05/08/2017 01:42 PM    HGB 9.2 (L) 05/08/2017 01:42 PM    HCT 28.8 (L) 05/08/2017 01:42 PM     05/08/2017 01:42 PM

## 2017-05-08 NOTE — PROGRESS NOTES
RENAL DAILY PROGRESS NOTE    Subjective:     Admitted for abd pain seen for LISBETH    Complaint: feels cold no CP/SOB.  Appetite fair, no N/V,    Current Facility-Administered Medications   Medication Dose Route Frequency    0.9% sodium chloride infusion 250 mL  250 mL IntraVENous PRN    0.9% sodium chloride infusion 250 mL  250 mL IntraVENous PRN    famotidine (PEPCID) tablet 20 mg  20 mg Oral DAILY    insulin lispro (HUMALOG) injection   SubCUTAneous AC&HS    0.9% sodium chloride infusion 250 mL  250 mL IntraVENous PRN    cefTRIAXone (ROCEPHIN) 2 g in 0.9% sodium chloride (MBP/ADV) 50 mL MBP  2 g IntraVENous Q24H    insulin glargine (LANTUS) injection 20 Units  20 Units SubCUTAneous DAILY    diphenhydrAMINE (BENADRYL) capsule 50 mg  50 mg Oral Q4H PRN    polyethylene glycol (MIRALAX) packet 17 g  17 g Oral TID    cholecalciferol (VITAMIN D3) tablet 2,000 Units  2,000 Units Oral DAILY    docusate sodium (COLACE) capsule 100 mg  100 mg Oral DAILY AFTER BREAKFAST    gabapentin (NEURONTIN) capsule 400 mg  400 mg Oral BID    oxyCODONE-acetaminophen (PERCOCET 7.5) 7.5-325 mg per tablet 1 Tab  1 Tab Oral Q4H PRN    pravastatin (PRAVACHOL) tablet 40 mg  40 mg Oral QHS    senna-docusate (PERICOLACE) 8.6-50 mg per tablet 2 Tab  2 Tab Oral PCD    acetaminophen (TYLENOL) tablet 500 mg  500 mg Oral Q6H PRN    allopurinol (ZYLOPRIM) tablet 100 mg  100 mg Oral DAILY    clotrimazole (MYCELEX) frances 10 mg  10 mg Oral 5XD    folic acid (FOLVITE) tablet 1 mg  1 mg Oral DAILY    ondansetron (ZOFRAN) injection 4 mg  4 mg IntraVENous Q8H PRN    glucose chewable tablet 16 g  16 g Oral PRN    glucagon (GLUCAGEN) injection 1 mg  1 mg IntraMUSCular PRN    dextrose (D50W) injection syrg 12.5-25 g  25-50 mL IntraVENous PRN           Objective:   Patient Vitals for the past 24 hrs:   Temp Pulse Resp BP SpO2   05/08/17 1630 99.1 °F (37.3 °C) 89 18 123/70 98 %   05/08/17 1223 98.2 °F (36.8 °C) 80 18 132/71 95 %   05/08/17 0830 98.7 °F (37.1 °C) 88 18 98/58 95 %   05/08/17 0400 98.6 °F (37 °C) 78 18 108/56 93 %   05/08/17 0000 100.2 °F (37.9 °C) 84 20 106/57 95 %   05/07/17 2000 99.8 °F (37.7 °C) 80 20 115/62 98 %        Weight change: 0.015 kg (0.5 oz)     05/06 1901 - 05/08 0700  In: -   Out: 3357.5 [Urine:3350; Drains:7.5]    Intake/Output Summary (Last 24 hours) at 05/08/17 1753  Last data filed at 05/08/17 0649   Gross per 24 hour   Intake                0 ml   Output             1500 ml   Net            -1500 ml     Physical Exam: alert, oriented x 3, afebrile    HEENT: non icteric  Neck: no JVD  Cardiovascular: regular, no rub  C/L: clear, no rales  Abdomen: soft, +bs  Ext: no edema    Data Review:     LABS:   Hematology: Recent Labs      05/08/17   1342  05/05/17   1952   WBC  7.4   --    HGB  9.2*  7.4*   HCT  28.8*  22.4*   Pl 151K  Chemistry: Recent Labs      05/07/17   0231  05/06/17   0555   BUN  26*  28*   CREA  2.38*  2.49*   CA  8.2*  8.0*   K  3.6  3.5   NA  138  135*   CL  104  104   CO2  29  23   GLU  91  128*       IMPRESSION AND PLAN:   LISBETH from Urinary retention improving slowly cont to trend, Check labs in am. Cont to avoid nephrotoxic drugs and adjust meds to renal function. Krueger to drain.   Anemia Hgb stable         Darling Fishman MD  5/8/2017

## 2017-05-09 LAB
ALBUMIN SERPL BCP-MCNC: 1.5 G/DL (ref 3.4–5)
ANION GAP BLD CALC-SCNC: 5 MMOL/L (ref 3–18)
BASOPHILS # BLD AUTO: 0 K/UL (ref 0–0.1)
BASOPHILS # BLD: 1 % (ref 0–2)
BUN SERPL-MCNC: 22 MG/DL (ref 7–18)
BUN/CREAT SERPL: 11 (ref 12–20)
CALCIUM SERPL-MCNC: 8.1 MG/DL (ref 8.5–10.1)
CHLORIDE SERPL-SCNC: 103 MMOL/L (ref 100–108)
CO2 SERPL-SCNC: 30 MMOL/L (ref 21–32)
CREAT SERPL-MCNC: 2.07 MG/DL (ref 0.6–1.3)
DIFFERENTIAL METHOD BLD: ABNORMAL
EOSINOPHIL # BLD: 0.2 K/UL (ref 0–0.4)
EOSINOPHIL NFR BLD: 3 % (ref 0–5)
ERYTHROCYTE [DISTWIDTH] IN BLOOD BY AUTOMATED COUNT: 17.1 % (ref 11.6–14.5)
GLUCOSE BLD STRIP.AUTO-MCNC: 170 MG/DL (ref 70–110)
GLUCOSE BLD STRIP.AUTO-MCNC: 178 MG/DL (ref 70–110)
GLUCOSE BLD STRIP.AUTO-MCNC: 96 MG/DL (ref 70–110)
GLUCOSE BLD STRIP.AUTO-MCNC: 99 MG/DL (ref 70–110)
GLUCOSE SERPL-MCNC: 78 MG/DL (ref 74–99)
HCT VFR BLD AUTO: 27.1 % (ref 35–45)
HGB BLD-MCNC: 8.9 G/DL (ref 12–16)
LYMPHOCYTES # BLD AUTO: 35 % (ref 21–52)
LYMPHOCYTES # BLD: 2.4 K/UL (ref 0.9–3.6)
MAGNESIUM SERPL-MCNC: 1.9 MG/DL (ref 1.6–2.6)
MCH RBC QN AUTO: 26.6 PG (ref 24–34)
MCHC RBC AUTO-ENTMCNC: 32.8 G/DL (ref 31–37)
MCV RBC AUTO: 81.1 FL (ref 74–97)
MONOCYTES # BLD: 0.7 K/UL (ref 0.05–1.2)
MONOCYTES NFR BLD AUTO: 10 % (ref 3–10)
NEUTS SEG # BLD: 3.5 K/UL (ref 1.8–8)
NEUTS SEG NFR BLD AUTO: 51 % (ref 40–73)
PHOSPHATE SERPL-MCNC: 3.8 MG/DL (ref 2.5–4.9)
PLATELET # BLD AUTO: 164 K/UL (ref 135–420)
PMV BLD AUTO: 8.6 FL (ref 9.2–11.8)
POTASSIUM SERPL-SCNC: 4.2 MMOL/L (ref 3.5–5.5)
RBC # BLD AUTO: 3.34 M/UL (ref 4.2–5.3)
SODIUM SERPL-SCNC: 138 MMOL/L (ref 136–145)
WBC # BLD AUTO: 6.9 K/UL (ref 4.6–13.2)

## 2017-05-09 PROCEDURE — 74011250636 HC RX REV CODE- 250/636: Performed by: INTERNAL MEDICINE

## 2017-05-09 PROCEDURE — 74011250637 HC RX REV CODE- 250/637: Performed by: INTERNAL MEDICINE

## 2017-05-09 PROCEDURE — 80069 RENAL FUNCTION PANEL: CPT | Performed by: INTERNAL MEDICINE

## 2017-05-09 PROCEDURE — 82962 GLUCOSE BLOOD TEST: CPT

## 2017-05-09 PROCEDURE — 65660000000 HC RM CCU STEPDOWN

## 2017-05-09 PROCEDURE — 83735 ASSAY OF MAGNESIUM: CPT | Performed by: INTERNAL MEDICINE

## 2017-05-09 PROCEDURE — 85025 COMPLETE CBC W/AUTO DIFF WBC: CPT | Performed by: INTERNAL MEDICINE

## 2017-05-09 PROCEDURE — 74011000258 HC RX REV CODE- 258: Performed by: INTERNAL MEDICINE

## 2017-05-09 PROCEDURE — 74011636637 HC RX REV CODE- 636/637: Performed by: INTERNAL MEDICINE

## 2017-05-09 PROCEDURE — 74011636637 HC RX REV CODE- 636/637: Performed by: HOSPITALIST

## 2017-05-09 PROCEDURE — 36415 COLL VENOUS BLD VENIPUNCTURE: CPT | Performed by: INTERNAL MEDICINE

## 2017-05-09 PROCEDURE — 74011250637 HC RX REV CODE- 250/637: Performed by: HOSPITALIST

## 2017-05-09 RX ORDER — SODIUM CHLORIDE 9 MG/ML
100 INJECTION, SOLUTION INTRAVENOUS CONTINUOUS
Status: DISCONTINUED | OUTPATIENT
Start: 2017-05-09 | End: 2017-05-15

## 2017-05-09 RX ADMIN — GABAPENTIN 400 MG: 400 CAPSULE ORAL at 17:35

## 2017-05-09 RX ADMIN — ALLOPURINOL 100 MG: 100 TABLET ORAL at 09:27

## 2017-05-09 RX ADMIN — ACETAMINOPHEN 500 MG: 500 TABLET, COATED ORAL at 17:34

## 2017-05-09 RX ADMIN — SODIUM CHLORIDE 50 ML/HR: 900 INJECTION, SOLUTION INTRAVENOUS at 17:41

## 2017-05-09 RX ADMIN — FAMOTIDINE 20 MG: 20 TABLET ORAL at 09:35

## 2017-05-09 RX ADMIN — INSULIN LISPRO 3 UNITS: 100 INJECTION, SOLUTION INTRAVENOUS; SUBCUTANEOUS at 14:00

## 2017-05-09 RX ADMIN — GABAPENTIN 400 MG: 400 CAPSULE ORAL at 09:27

## 2017-05-09 RX ADMIN — FOLIC ACID 1 MG: 1 TABLET ORAL at 09:27

## 2017-05-09 RX ADMIN — CLOTRIMAZOLE 10 MG: 10 LOZENGE ORAL at 13:59

## 2017-05-09 RX ADMIN — INSULIN GLARGINE 20 UNITS: 100 INJECTION, SOLUTION SUBCUTANEOUS at 09:00

## 2017-05-09 RX ADMIN — DOCUSATE SODIUM 100 MG: 100 CAPSULE, LIQUID FILLED ORAL at 09:27

## 2017-05-09 RX ADMIN — OXYCODONE HYDROCHLORIDE AND ACETAMINOPHEN 1 TABLET: 7.5; 325 TABLET ORAL at 22:07

## 2017-05-09 RX ADMIN — INSULIN LISPRO 3 UNITS: 100 INJECTION, SOLUTION INTRAVENOUS; SUBCUTANEOUS at 21:38

## 2017-05-09 RX ADMIN — CHOLECALCIFEROL TAB 25 MCG (1000 UNIT) 2000 UNITS: 25 TAB at 09:26

## 2017-05-09 RX ADMIN — PRAVASTATIN SODIUM 40 MG: 20 TABLET ORAL at 21:38

## 2017-05-09 RX ADMIN — SODIUM CHLORIDE 250 ML: 900 INJECTION, SOLUTION INTRAVENOUS at 10:45

## 2017-05-09 RX ADMIN — CEFTRIAXONE SODIUM 2 G: 2 INJECTION, POWDER, FOR SOLUTION INTRAMUSCULAR; INTRAVENOUS at 10:39

## 2017-05-09 NOTE — PROGRESS NOTES
SUBJECTIVE:    Continues to have abdominal pain and feeling cold. No chest pain or SOB or cough. No N/V/D. OBJECTIVE:    Visit Vitals    /57 (BP 1 Location: Left arm, BP Patient Position: At rest)    Pulse 87    Temp 98.8 °F (37.1 °C)    Resp 18    Ht 5' 7\" (1.702 m)    Wt 116.9 kg (257 lb 12.8 oz)    SpO2 93%    Breastfeeding No    BMI 40.38 kg/m2     RS: CTA bilaterally  CVS: RRR  GI: ND, BS +  Extremities: pedal edema  General: NAD    ASSESSMENT:    1. Sepsis secondary to infected right psoas hematoma/epidural abscess with neurological compromise resulting in paraplegia. s/p drain. 2. 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 3. h/o pain at the site of abdominal pacemaker  4. Heart block s/p AICD 2005 with upgrade to BiV later that year, removed however in 2012 due to trauma and infection. New biventricular pacemaker LUQ 9/2012 by Dr. 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 5. Elevated troponin likely demand ischemia in setting of sepsis. 6. H/p transient nonischemic CMY with EF 45%  7. Acute kidney injury due to neurogenic bladder and Component of ATN, improving  8. Acute on chronic anemia s/p 2 units  9. Diabetes mellitus. 10.Dyslipidemia. 6. H/o HTN. 12. LBBB. 13. HALI on CPAP. 15. H/o DVT.     PLAN:    Cont antibiotic per ID  Cont current management including perez catheter  Normal saline will be started  IR consult placed  Patient is high risk for recurrent sepsis and sudden cardiac death      CMP:   Lab Results   Component Value Date/Time     05/09/2017 04:04 AM    K 4.2 05/09/2017 04:04 AM     05/09/2017 04:04 AM    CO2 30 05/09/2017 04:04 AM    AGAP 5 05/09/2017 04:04 AM    GLU 78 05/09/2017 04:04 AM    BUN 22 (H) 05/09/2017 04:04 AM    CREA 2.07 (H) 05/09/2017 04:04 AM    GFRAA 29 (L) 05/09/2017 04:04 AM    GFRNA 24 (L) 05/09/2017 04:04 AM    CA 8.1 (L) 05/09/2017 04:04 AM    MG 1.9 05/09/2017 04:04 AM PHOS 3.8 05/09/2017 04:04 AM    ALB 1.5 (L) 05/09/2017 04:04 AM        CBC:   Lab Results   Component Value Date/Time    WBC 6.9 05/09/2017 04:04 AM    HGB 8.9 (L) 05/09/2017 04:04 AM    HCT 27.1 (L) 05/09/2017 04:04 AM     05/09/2017 04:04 AM

## 2017-05-09 NOTE — PROGRESS NOTES
Progress Note    Patient: Amanda Garcia MRN: [id number]  SSN: [id number]    YOB: 1950  Age: 77 y.o. Sex: female      Admit Date: 4/20/2017    LOS: 19 days     Subjective:     Still  spiking low grade temp  no flank or back pain  on antibiotics  minimal drainage from  drain in  psoas    Objective:     Vitals:    05/08/17 2000 05/09/17 0000 05/09/17 0400 05/09/17 0556   BP: 109/60 107/65 116/67    Pulse: 94 83 80    Resp: 19 19 19    Temp: 100.1 °F (37.8 °C) 99.2 °F (37.3 °C) 98.7 °F (37.1 °C)    TempSrc:       SpO2: 96% 95% 94%    Weight:    257 lb 12.8 oz (116.9 kg)   Height:            Intake and Output:  Current Shift:    Last three shifts: 05/07 1901 - 05/09 0700  In: 280 [P.O.:240;  I.V.:40]  Out: 2400 [Urine:2400]    Current Facility-Administered Medications   Medication Dose Route Frequency    0.9% sodium chloride infusion 250 mL  250 mL IntraVENous PRN    0.9% sodium chloride infusion 250 mL  250 mL IntraVENous PRN    famotidine (PEPCID) tablet 20 mg  20 mg Oral DAILY    insulin lispro (HUMALOG) injection   SubCUTAneous AC&HS    0.9% sodium chloride infusion 250 mL  250 mL IntraVENous PRN    cefTRIAXone (ROCEPHIN) 2 g in 0.9% sodium chloride (MBP/ADV) 50 mL MBP  2 g IntraVENous Q24H    insulin glargine (LANTUS) injection 20 Units  20 Units SubCUTAneous DAILY    diphenhydrAMINE (BENADRYL) capsule 50 mg  50 mg Oral Q4H PRN    polyethylene glycol (MIRALAX) packet 17 g  17 g Oral TID    cholecalciferol (VITAMIN D3) tablet 2,000 Units  2,000 Units Oral DAILY    docusate sodium (COLACE) capsule 100 mg  100 mg Oral DAILY AFTER BREAKFAST    gabapentin (NEURONTIN) capsule 400 mg  400 mg Oral BID    oxyCODONE-acetaminophen (PERCOCET 7.5) 7.5-325 mg per tablet 1 Tab  1 Tab Oral Q4H PRN    pravastatin (PRAVACHOL) tablet 40 mg  40 mg Oral QHS    senna-docusate (PERICOLACE) 8.6-50 mg per tablet 2 Tab  2 Tab Oral PCD    acetaminophen (TYLENOL) tablet 500 mg  500 mg Oral Q6H PRN    allopurinol (ZYLOPRIM) tablet 100 mg  100 mg Oral DAILY    clotrimazole (MYCELEX) frances 10 mg  10 mg Oral 5XD    folic acid (FOLVITE) tablet 1 mg  1 mg Oral DAILY    ondansetron (ZOFRAN) injection 4 mg  4 mg IntraVENous Q8H PRN    glucose chewable tablet 16 g  16 g Oral PRN    glucagon (GLUCAGEN) injection 1 mg  1 mg IntraMUSCular PRN    dextrose (D50W) injection syrg 12.5-25 g  25-50 mL IntraVENous PRN         Physical Exam:   GENERAL: alert, cooperative, no distress, appears stated age  ABDOMEN: soft, non-tender. Bowel sounds normal. No masses,  no organomegaly  flank: non  tender      Lab/Data Review:  BMP:   Lab Results   Component Value Date/Time     05/09/2017 04:04 AM    K 4.2 05/09/2017 04:04 AM     05/09/2017 04:04 AM    CO2 30 05/09/2017 04:04 AM    AGAP 5 05/09/2017 04:04 AM    GLU 78 05/09/2017 04:04 AM    BUN 22 (H) 05/09/2017 04:04 AM    CREA 2.07 (H) 05/09/2017 04:04 AM    GFRAA 29 (L) 05/09/2017 04:04 AM    GFRNA 24 (L) 05/09/2017 04:04 AM     CMP:   Lab Results   Component Value Date/Time     05/09/2017 04:04 AM    K 4.2 05/09/2017 04:04 AM     05/09/2017 04:04 AM    CO2 30 05/09/2017 04:04 AM    AGAP 5 05/09/2017 04:04 AM    GLU 78 05/09/2017 04:04 AM    BUN 22 (H) 05/09/2017 04:04 AM    CREA 2.07 (H) 05/09/2017 04:04 AM    GFRAA 29 (L) 05/09/2017 04:04 AM    GFRNA 24 (L) 05/09/2017 04:04 AM    CA 8.1 (L) 05/09/2017 04:04 AM    MG 1.9 05/09/2017 04:04 AM    PHOS 3.8 05/09/2017 04:04 AM    ALB 1.5 (L) 05/09/2017 04:04 AM     CBC:   Lab Results   Component Value Date/Time    WBC 6.9 05/09/2017 04:04 AM    HGB 8.9 (L) 05/09/2017 04:04 AM    HCT 27.1 (L) 05/09/2017 04:04 AM     05/09/2017 04:04 AM          CT Results:    Results from Hospital Encounter encounter on 04/20/17   CT ABD PELV WO CONT   Narrative CT Abdomen And Pelvis without Intravenous Contrast    INDICATION: Generalized abdominal pain. Follow-up psoas hematoma/abscess.  Now  with dropping hemoglobin. TECHNIQUE: 5 mm collimation axial images obtained from the diaphragm to the  level of the pubic symphysis without administration of low osmolar, nonionic  intravenous contrast.    Dose reduction techniques used: Automated exposure control, adjustment of the  mAs and/or kVp according to patient size, standardized low-dose protocol, and/or  iterative reconstruction technique. COMPARISON: April 26, 2017. ABDOMEN FINDINGS:    Lung Bases: There are small bilateral pleural effusions, left greater than  right. These are similar in size to prior. There is associated atelectasis. Heart is mildly enlarged in size. Liver: Normal attenuation. Posterior subcapsular calcifications are unchanged. No definite new mass. Gallbladder: Status post cholecystectomy. No biliary ductal dilatation. Pancreas: The pancreas is unchanged. Spleen: The spleen is mildly enlarged in size. Stable calcification band in the  posterior aspect. The spleen measures 14.7 cm. Adrenal Glands: No evidence for mass. Kidneys:     Right: Mild cortical thinning. Hypodensity cortical lesion is stable. No  hydronephrosis. Left:  Nonspecific bilateral perinephric stranding. Exophytic lower pole cyst  is stable. No hydronephrosis. Possible nonobstructive renal calculus versus  vascular calcification. Lymph Nodes: No lymphadenopathy. Aorta:   Normal in caliber. Scattered atherosclerotic disease of aorta. Percutaneous pigtail drainage catheter in the right psoas muscle is unchanged in  position. The right psoas muscle remains enlarged. At the level of the pigtail  catheter, the psoas muscle measures 4.3 cm x 4.0 cm, previously 4.4 cm x 3.9 cm  by similar measurements. More inferiorly, there is increasing low density  measuring approximately 2.1 cm x 2.2 cm in the right psoas muscle. This area  measures complex density.     Heterogeneity in the right psoas muscle anterior to the right hip is seen, but  not definitively changed from prior    PELVIS FINDINGS:     Bowel: Small Bowel: Normal in caliber with normal wall thickness. Large Bowel: Moderate stool burden without evidence of obstruction or  inflammation. Appendix: Normal appendix. Bladder: Collapsed with Krueger catheter in place. The uterus is surgically absent. No suspicious adnexal mass. Stable calcified  left adnexal mass. There is slightly increased confluent presacral edema which measures mildly  complex density. There is diffuse anasarca. Bones: There are degenerative changes of the spine. There is no suspicious  osseous lesion. .         Impression IMPRESSION:    Increasing focal area within the right psoas muscle inferior to the previously  drained area. This measures mildly complex density and could represent either  spread of infection inferiorly or intramuscular hematoma. Differentiation is not  possible without intravenous contrast.    Mild heterogeneity in the right psoas muscle anterior to the right hip is seen  but incompletely assessed without intravenous contrast.    Slightly increasing presacral edema/fluid, some of which measures mildly complex  density. A component of blood products cannot be excluded. Ongoing diffuse anasarca with subcutaneous edema and small pleural effusions. Extensive bibasilar atelectasis with or without infiltrates. Remainder is stable in the short interval. Please see above for complete  details. US Results:    Results from [place] encounter on 04/20/17   US ABD LTD   Narrative ULTRASOUND ABDOMEN-LIMITED    INDICATION: Evaluation for fluid collection/abscess at the site of pacemaker  pocket    COMPARISON: Correlation with CT abdomen/pelvis 4/19/2017    TECHNIQUE: Ultrasound evaluation was performed of the left upper quadrant  abdomen in the area of pacemaker. Selected static images are submitted for  review.     FINDINGS:  In the area of concern, there is no evidence for focal fluid collection/abscess. Impression IMPRESSION:  As above. Assessment:     Principal Problem:    Acute paraplegia ([address] (4/20/2017)    Active Problems:    Type 2 diabetes mellitus with diabetic neuropathy ([address] (6/28/2011)      AICD generator infection ([address] (8/20/2012)      Overview: S/p explant 4 leads 8/20/12      Benign hypertensive heart disease with systolic CHF, NYHA class 2 ([address] (9/5/2012)      Decreased calculated glomerular filtration rate (GFR) (3/30/2017)      Overview: Calculated GFR equivalent to that of CKD stage 3 = 30-59 ml/min      Iliopsoas muscle hematoma (3/30/2017)      Psoas hematoma, right, secondary to anticoagulant therapy (3/30/2017)      Impaired mobility and ADLs (3/30/2017)      History of Coumadin therapy ()      Overview: Anticoagulation for chronic atrial fibrillation; Discontinued on 3/30/2017      Sepsis ([address] (4/20/2017)      Psoas abscess, right ([address] (4/20/2017)      Krueger catheter in place on admission (4/20/2017)      Urinary tract infection due to Enterococcus (4/20/2017)      Group B streptococcal infection (4/20/2017)        Plan:     d/w  dr Margaux Mcgee. ..  would consider  repeat CT to see if there is another accumulation of abscess  that needs  to be drained    Signed By: Blaise Patterson MD , FACS    May 9, 2017      PAGER:  [phone]  CELL:  [facility]  OFFICE:  [address] [place], [place] [id number]

## 2017-05-09 NOTE — PROGRESS NOTES
RENAL DAILY PROGRESS NOTE    Subjective:     Admitted for abd pain seen for LISBETH    Complaint: still feels cold, no CP/SOB.  Appetite fair, no N/V, sister at bedside    Current Facility-Administered Medications   Medication Dose Route Frequency    0.9% sodium chloride infusion  50 mL/hr IntraVENous CONTINUOUS    famotidine (PEPCID) tablet 20 mg  20 mg Oral DAILY    insulin lispro (HUMALOG) injection   SubCUTAneous AC&HS    0.9% sodium chloride infusion 250 mL  250 mL IntraVENous PRN    cefTRIAXone (ROCEPHIN) 2 g in 0.9% sodium chloride (MBP/ADV) 50 mL MBP  2 g IntraVENous Q24H    insulin glargine (LANTUS) injection 20 Units  20 Units SubCUTAneous DAILY    diphenhydrAMINE (BENADRYL) capsule 50 mg  50 mg Oral Q4H PRN    polyethylene glycol (MIRALAX) packet 17 g  17 g Oral TID    cholecalciferol (VITAMIN D3) tablet 2,000 Units  2,000 Units Oral DAILY    docusate sodium (COLACE) capsule 100 mg  100 mg Oral DAILY AFTER BREAKFAST    gabapentin (NEURONTIN) capsule 400 mg  400 mg Oral BID    oxyCODONE-acetaminophen (PERCOCET 7.5) 7.5-325 mg per tablet 1 Tab  1 Tab Oral Q4H PRN    pravastatin (PRAVACHOL) tablet 40 mg  40 mg Oral QHS    senna-docusate (PERICOLACE) 8.6-50 mg per tablet 2 Tab  2 Tab Oral PCD    acetaminophen (TYLENOL) tablet 500 mg  500 mg Oral Q6H PRN    allopurinol (ZYLOPRIM) tablet 100 mg  100 mg Oral DAILY    clotrimazole (MYCELEX) frances 10 mg  10 mg Oral 5XD    folic acid (FOLVITE) tablet 1 mg  1 mg Oral DAILY    ondansetron (ZOFRAN) injection 4 mg  4 mg IntraVENous Q8H PRN    glucose chewable tablet 16 g  16 g Oral PRN    glucagon (GLUCAGEN) injection 1 mg  1 mg IntraMUSCular PRN    dextrose (D50W) injection syrg 12.5-25 g  25-50 mL IntraVENous PRN           Objective:     Patient Vitals for the past 24 hrs:   Temp Pulse Resp BP SpO2   05/09/17 1600 97.5 °F (36.4 °C) 86 18 133/70 99 %   05/09/17 1230 98.8 °F (37.1 °C) 67 18 115/64 99 %   05/09/17 0910 98.8 °F (37.1 °C) 87 18 100/57 93 %   05/09/17 0400 98.7 °F (37.1 °C) 80 19 116/67 94 %   05/09/17 0000 99.2 °F (37.3 °C) 83 19 107/65 95 %   05/08/17 2000 100.1 °F (37.8 °C) 94 19 109/60 96 %        Weight change: 2.752 kg (6 lb 1.1 oz)     05/07 1901 - 05/09 0700  In: 280 [P.O.:240; I.V.:40]  Out: 2400 [Urine:2400]    Intake/Output Summary (Last 24 hours) at 05/09/17 1743  Last data filed at 05/09/17 0946   Gross per 24 hour   Intake              280 ml   Output             1900 ml   Net            -1620 ml     Physical Exam: alert, oriented x 3, afebrile    HEENT: non icteric  Neck: no JVD  Cardiovascular: regular, no rub  C/L: clear, no rales  Abdomen: soft, +bs  Ext: no edema    Data Review:     LABS:   Hematology:   Recent Labs      05/09/17   0404  05/08/17   1342   WBC  6.9  7.4   HGB  8.9*  9.2*   HCT  27.1*  28.8*   Pl 164K  Chemistry:   Recent Labs      05/09/17   0404  05/07/17   0231   BUN  22*  26*   CREA  2.07*  2.38*   CA  8.1*  8.2*   ALB  1.5*   --    K  4.2  3.6   NA  138  138   CL  103  104   CO2  30  29   PHOS  3.8   --    GLU  78  91       IMPRESSION AND PLAN:   LISBETH from Urinary retention improving slowly. Good urine output. Cont to avoid nephrotoxic drugs and adjust meds to renal function. Krueger to drain.   Anemia Hgb stable         Lashon Covarrubias MD  5/9/2017

## 2017-05-09 NOTE — PROGRESS NOTES
ARU/IPR REFERRAL CONTACT NOTE  [address] [place] [address] for Physical Rehabilitation    RE: Keesha Espinosa     Thank you for the opportunity to review this patient's case for admission to [address] [place] [address] for Physical Rehabilitation. Based on our pre-admission screening:     [x ] This patient does not meet criteria for admission to [facility] for Physical  Rehabilitation due to:    [x ] Too low level, per documentation, patient has not demonstrated tolerance for acute rehabilitation level of intensity. Pt declines ARU. She does not feel she can tolerate the intensity of the acute ARU. She is requesting SNF. [x ] We recommend the following:  [ ] Re-evaluation of this patient's status when appropriate  [ ] Home with Home Care Services  [ ] Outpatient Therapy Services  [x ] Skilled Nursing Facility/Sub Acute Services with extended stay option  [ ] Assisted Living/ Adult Home    Again, Thank you for this referral. Should you have any questions please do not hesitate to call. Sincerely,  Devaughn Reno. [place], [address]   [place], RN  Admissions [facility] for Physical Rehabilitation  [phone]

## 2017-05-09 NOTE — PROGRESS NOTES
Problem: Mobility Impaired (Adult and Pediatric)  Goal: *Acute Goals and Plan of Care (Insert Text)  STGs to be addressed within 3 days:  1. Bed mobility: Supine to sit to supine MaxAx1 with HR for meals. 2. Activity tolerance: Tolerate EOB > 15 minutes for ADLs. 3. Transfers: SPT-->to chair max/mod A with LRAD for ADLs. 4. Pt demo fair seated balance in preparation for functional transfers. LTGs to be addressed within 7 days:  1. Transfers: SB-->to chair/wc max/mod A for ADLs. 2. Activity tolerance: Tolerated > 1 hr in chair for change of position. 3. Patient Education: Independent with HEP for home safety. Time: 1325                 Pt found sleeping soundly and does not readily arouse to verbal stimuli. Pt's family member at bedside reports that pt had fallen asleep min's earlier. Will allow pt some rest during quite time and follow up as the day progresses.      Efrain Hauser PTA  5/9/2017

## 2017-05-10 ENCOUNTER — APPOINTMENT (OUTPATIENT)
Dept: NUCLEAR MEDICINE | Age: 67
DRG: 853 | End: 2017-05-10
Attending: INTERNAL MEDICINE
Payer: COMMERCIAL

## 2017-05-10 ENCOUNTER — APPOINTMENT (OUTPATIENT)
Dept: GENERAL RADIOLOGY | Age: 67
DRG: 853 | End: 2017-05-10
Attending: INTERNAL MEDICINE
Payer: COMMERCIAL

## 2017-05-10 ENCOUNTER — APPOINTMENT (OUTPATIENT)
Dept: CT IMAGING | Age: 67
DRG: 853 | End: 2017-05-10
Attending: RADIOLOGY
Payer: COMMERCIAL

## 2017-05-10 LAB
ANION GAP BLD CALC-SCNC: 8 MMOL/L (ref 3–18)
BUN SERPL-MCNC: 20 MG/DL (ref 7–18)
BUN/CREAT SERPL: 11 (ref 12–20)
CALCIUM SERPL-MCNC: 8.2 MG/DL (ref 8.5–10.1)
CHLORIDE SERPL-SCNC: 101 MMOL/L (ref 100–108)
CK MB CFR SERPL CALC: 3.2 % (ref 0–4)
CK MB SERPL-MCNC: 1.6 NG/ML (ref 5–25)
CK SERPL-CCNC: 50 U/L (ref 26–192)
CO2 SERPL-SCNC: 27 MMOL/L (ref 21–32)
CREAT SERPL-MCNC: 1.77 MG/DL (ref 0.6–1.3)
GLUCOSE BLD STRIP.AUTO-MCNC: 121 MG/DL (ref 70–110)
GLUCOSE BLD STRIP.AUTO-MCNC: 142 MG/DL (ref 70–110)
GLUCOSE BLD STRIP.AUTO-MCNC: 144 MG/DL (ref 70–110)
GLUCOSE BLD STRIP.AUTO-MCNC: 146 MG/DL (ref 70–110)
GLUCOSE SERPL-MCNC: 92 MG/DL (ref 74–99)
HCT VFR BLD AUTO: 27.1 % (ref 35–45)
HGB BLD-MCNC: 8.9 G/DL (ref 12–16)
POTASSIUM SERPL-SCNC: 3.6 MMOL/L (ref 3.5–5.5)
SODIUM SERPL-SCNC: 136 MMOL/L (ref 136–145)
TROPONIN I SERPL-MCNC: 0.02 NG/ML (ref 0–0.04)

## 2017-05-10 PROCEDURE — A9540 TC99M MAA: HCPCS

## 2017-05-10 PROCEDURE — 74011250636 HC RX REV CODE- 250/636: Performed by: INTERNAL MEDICINE

## 2017-05-10 PROCEDURE — 85018 HEMOGLOBIN: CPT | Performed by: INTERNAL MEDICINE

## 2017-05-10 PROCEDURE — 74011250637 HC RX REV CODE- 250/637: Performed by: INTERNAL MEDICINE

## 2017-05-10 PROCEDURE — 85025 COMPLETE CBC W/AUTO DIFF WBC: CPT | Performed by: INTERNAL MEDICINE

## 2017-05-10 PROCEDURE — 74011000250 HC RX REV CODE- 250: Performed by: INTERNAL MEDICINE

## 2017-05-10 PROCEDURE — 82550 ASSAY OF CK (CPK): CPT | Performed by: INTERNAL MEDICINE

## 2017-05-10 PROCEDURE — 74011250637 HC RX REV CODE- 250/637: Performed by: HOSPITALIST

## 2017-05-10 PROCEDURE — 99152 MOD SED SAME PHYS/QHP 5/>YRS: CPT

## 2017-05-10 PROCEDURE — 49406 IMAGE CATH FLUID PERI/RETRO: CPT

## 2017-05-10 PROCEDURE — 74011250636 HC RX REV CODE- 250/636

## 2017-05-10 PROCEDURE — 65660000000 HC RM CCU STEPDOWN

## 2017-05-10 PROCEDURE — 83735 ASSAY OF MAGNESIUM: CPT | Performed by: INTERNAL MEDICINE

## 2017-05-10 PROCEDURE — 82962 GLUCOSE BLOOD TEST: CPT

## 2017-05-10 PROCEDURE — 80048 BASIC METABOLIC PNL TOTAL CA: CPT | Performed by: INTERNAL MEDICINE

## 2017-05-10 PROCEDURE — 0K9N30Z DRAINAGE OF RIGHT HIP MUSCLE WITH DRAINAGE DEVICE, PERCUTANEOUS APPROACH: ICD-10-PCS | Performed by: RADIOLOGY

## 2017-05-10 PROCEDURE — 71010 XR CHEST SNGL V: CPT

## 2017-05-10 PROCEDURE — 87040 BLOOD CULTURE FOR BACTERIA: CPT | Performed by: INTERNAL MEDICINE

## 2017-05-10 PROCEDURE — 85730 THROMBOPLASTIN TIME PARTIAL: CPT | Performed by: HOSPITALIST

## 2017-05-10 PROCEDURE — 36415 COLL VENOUS BLD VENIPUNCTURE: CPT | Performed by: INTERNAL MEDICINE

## 2017-05-10 RX ORDER — MIDAZOLAM HYDROCHLORIDE 1 MG/ML
INJECTION, SOLUTION INTRAMUSCULAR; INTRAVENOUS
Status: COMPLETED
Start: 2017-05-10 | End: 2017-05-10

## 2017-05-10 RX ORDER — NALOXONE HYDROCHLORIDE 0.4 MG/ML
0.1 INJECTION, SOLUTION INTRAMUSCULAR; INTRAVENOUS; SUBCUTANEOUS
Status: DISCONTINUED | OUTPATIENT
Start: 2017-05-10 | End: 2017-05-25 | Stop reason: HOSPADM

## 2017-05-10 RX ORDER — FENTANYL CITRATE 50 UG/ML
INJECTION, SOLUTION INTRAMUSCULAR; INTRAVENOUS
Status: COMPLETED
Start: 2017-05-10 | End: 2017-05-10

## 2017-05-10 RX ORDER — FENTANYL CITRATE 50 UG/ML
50 INJECTION, SOLUTION INTRAMUSCULAR; INTRAVENOUS
Status: DISCONTINUED | OUTPATIENT
Start: 2017-05-10 | End: 2017-05-10 | Stop reason: ALTCHOICE

## 2017-05-10 RX ORDER — FLUMAZENIL 0.1 MG/ML
0.2 INJECTION INTRAVENOUS
Status: DISCONTINUED | OUTPATIENT
Start: 2017-05-10 | End: 2017-05-11

## 2017-05-10 RX ORDER — SODIUM CHLORIDE 0.9 % (FLUSH) 0.9 %
5-10 SYRINGE (ML) INJECTION AS NEEDED
Status: DISCONTINUED | OUTPATIENT
Start: 2017-05-10 | End: 2017-05-11

## 2017-05-10 RX ORDER — ACETAMINOPHEN 325 MG/1
650 TABLET ORAL
Status: DISCONTINUED | OUTPATIENT
Start: 2017-05-10 | End: 2017-05-25 | Stop reason: HOSPADM

## 2017-05-10 RX ORDER — SODIUM CHLORIDE 0.9 % (FLUSH) 0.9 %
5-10 SYRINGE (ML) INJECTION EVERY 8 HOURS
Status: DISCONTINUED | OUTPATIENT
Start: 2017-05-10 | End: 2017-05-25 | Stop reason: HOSPADM

## 2017-05-10 RX ORDER — MIDAZOLAM HYDROCHLORIDE 1 MG/ML
1 INJECTION, SOLUTION INTRAMUSCULAR; INTRAVENOUS
Status: DISCONTINUED | OUTPATIENT
Start: 2017-05-10 | End: 2017-05-10 | Stop reason: ALTCHOICE

## 2017-05-10 RX ORDER — HEPARIN SODIUM 10000 [USP'U]/100ML
18-36 INJECTION, SOLUTION INTRAVENOUS
Status: DISCONTINUED | OUTPATIENT
Start: 2017-05-10 | End: 2017-05-11

## 2017-05-10 RX ORDER — IPRATROPIUM BROMIDE AND ALBUTEROL SULFATE 2.5; .5 MG/3ML; MG/3ML
3 SOLUTION RESPIRATORY (INHALATION)
Status: DISCONTINUED | OUTPATIENT
Start: 2017-05-10 | End: 2017-05-11

## 2017-05-10 RX ADMIN — CHOLECALCIFEROL TAB 25 MCG (1000 UNIT) 2000 UNITS: 25 TAB at 08:38

## 2017-05-10 RX ADMIN — FENTANYL CITRATE 50 MCG: 50 INJECTION, SOLUTION INTRAMUSCULAR; INTRAVENOUS at 12:52

## 2017-05-10 RX ADMIN — OXYCODONE HYDROCHLORIDE AND ACETAMINOPHEN 1 TABLET: 7.5; 325 TABLET ORAL at 20:34

## 2017-05-10 RX ADMIN — FENTANYL CITRATE 50 MCG: 50 INJECTION INTRAMUSCULAR; INTRAVENOUS at 12:47

## 2017-05-10 RX ADMIN — MIDAZOLAM HYDROCHLORIDE 1 MG: 1 INJECTION, SOLUTION INTRAMUSCULAR; INTRAVENOUS at 12:47

## 2017-05-10 RX ADMIN — ACETAMINOPHEN 500 MG: 500 TABLET, COATED ORAL at 08:52

## 2017-05-10 RX ADMIN — FENTANYL CITRATE 25 MCG: 50 INJECTION, SOLUTION INTRAMUSCULAR; INTRAVENOUS at 13:11

## 2017-05-10 RX ADMIN — WATER 2 MG: 1 INJECTION INTRAMUSCULAR; INTRAVENOUS; SUBCUTANEOUS at 20:21

## 2017-05-10 RX ADMIN — MIDAZOLAM HYDROCHLORIDE 0.5 MG: 1 INJECTION, SOLUTION INTRAMUSCULAR; INTRAVENOUS at 13:11

## 2017-05-10 RX ADMIN — DOCUSATE SODIUM 100 MG: 100 CAPSULE, LIQUID FILLED ORAL at 08:39

## 2017-05-10 RX ADMIN — MIDAZOLAM HYDROCHLORIDE 1 MG: 1 INJECTION, SOLUTION INTRAMUSCULAR; INTRAVENOUS at 12:52

## 2017-05-10 RX ADMIN — GABAPENTIN 400 MG: 400 CAPSULE ORAL at 08:39

## 2017-05-10 RX ADMIN — GABAPENTIN 400 MG: 400 CAPSULE ORAL at 20:00

## 2017-05-10 RX ADMIN — OXYCODONE HYDROCHLORIDE AND ACETAMINOPHEN 1 TABLET: 7.5; 325 TABLET ORAL at 03:43

## 2017-05-10 RX ADMIN — FOLIC ACID 1 MG: 1 TABLET ORAL at 08:39

## 2017-05-10 RX ADMIN — PRAVASTATIN SODIUM 40 MG: 20 TABLET ORAL at 22:13

## 2017-05-10 RX ADMIN — MIDAZOLAM HYDROCHLORIDE 0.5 MG: 1 INJECTION, SOLUTION INTRAMUSCULAR; INTRAVENOUS at 13:06

## 2017-05-10 RX ADMIN — ALLOPURINOL 100 MG: 100 TABLET ORAL at 08:38

## 2017-05-10 RX ADMIN — FENTANYL CITRATE 25 MCG: 50 INJECTION, SOLUTION INTRAMUSCULAR; INTRAVENOUS at 13:06

## 2017-05-10 RX ADMIN — FENTANYL CITRATE 50 MCG: 50 INJECTION, SOLUTION INTRAMUSCULAR; INTRAVENOUS at 12:47

## 2017-05-10 RX ADMIN — Medication 2 TABLET: at 20:00

## 2017-05-10 RX ADMIN — FAMOTIDINE 20 MG: 20 TABLET ORAL at 08:39

## 2017-05-10 RX ADMIN — OXYCODONE HYDROCHLORIDE AND ACETAMINOPHEN 1 TABLET: 7.5; 325 TABLET ORAL at 14:44

## 2017-05-10 RX ADMIN — ACETAMINOPHEN 500 MG: 500 TABLET, COATED ORAL at 20:34

## 2017-05-10 RX ADMIN — Medication 10 ML: at 20:43

## 2017-05-10 NOTE — PROGRESS NOTES
SUBJECTIVE:    Just came back from OR. Having chills and chest pain. No abdominal pain or SOB or cough. No N/V/D. Addendum: nurse called me to inform that patient continues to have left shoulder pain and now hypoxia. They have not done cardiac enzymes yet. I called lab myself [talked to ms. Farnaz Chung and asked them to do it. Ordered heparin drip, stat CXR and VQ scan. Patient has hematoma but high risk for PE until we ruled it out. If VQ is negative, will stop heparin drip. Talked to patient's daughter over the phone and explained her the situation. He verbalized understanding and agree with the plan. OBJECTIVE:    Visit Vitals    /63    Pulse 82    Temp 98.3 °F (36.8 °C)    Resp 20    Ht 5' 7\" (1.702 m)    Wt 116.6 kg (257 lb 2.2 oz)    SpO2 98%    Breastfeeding No    BMI 40.27 kg/m2     RS: CTA bilaterally  Chest: no palpable chest tenderness  CVS: RRR  GI: ND, BS +  Extremities: pedal edema  General: NAD    ASSESSMENT:    1. Sepsis secondary to infected right psoas hematoma/epidural abscess with neurological compromise resulting in paraplegia. S/p CT guided right new psoas muscle drainage catheter placement and old drain removal.  2. 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 3. h/o pain at the site of abdominal pacemaker  4. Heart block s/p AICD 2005 with upgrade to BiV later that year, removed however in 2012 due to trauma and infection. New biventricular pacemaker LUQ 9/2012 by Dr. 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 5. Elevated troponin likely demand ischemia in setting of sepsis. 6. H/p transient nonischemic CMY with EF 45%  7. Acute kidney injury due to neurogenic bladder and Component of ATN, improving  8. Acute on chronic anemia s/p 2 units  9. Diabetes mellitus. 10.Dyslipidemia. 6. H/o HTN. 12. LBBB. 13. HALI on CPAP. 15. H/o DVT.   15. Chest pain     PLAN:    Cont antibiotic per ID  Cont current management including perez catheter  Sona x 2 and EKG now  Patient is high risk for recurrent sepsis and sudden cardiac death - d/w family.        CMP:   Lab Results   Component Value Date/Time     05/10/2017 03:27 AM    K 3.6 05/10/2017 03:27 AM     05/10/2017 03:27 AM    CO2 27 05/10/2017 03:27 AM    AGAP 8 05/10/2017 03:27 AM    GLU 92 05/10/2017 03:27 AM    BUN 20 (H) 05/10/2017 03:27 AM    CREA 1.77 (H) 05/10/2017 03:27 AM    GFRAA 35 (L) 05/10/2017 03:27 AM    GFRNA 29 (L) 05/10/2017 03:27 AM    CA 8.2 (L) 05/10/2017 03:27 AM        CBC:   Lab Results   Component Value Date/Time    HGB 8.9 (L) 05/10/2017 03:27 AM    HCT 27.1 (L) 05/10/2017 03:27 AM

## 2017-05-10 NOTE — PROGRESS NOTES
Problem: Mobility Impaired (Adult and Pediatric)  Goal: *Acute Goals and Plan of Care (Insert Text)  STGs to be addressed within 3 days:  1. Bed mobility: Supine to sit to supine MaxAx1 with HR for meals. 2. Activity tolerance: Tolerate EOB > 15 minutes for ADLs. 3. Transfers: SPT-->to chair max/mod A with LRAD for ADLs. 4. Pt demo fair seated balance in preparation for functional transfers. LTGs to be addressed within 7 days:  1. Transfers: SB-->to chair/wc max/mod A for ADLs. 2. Activity tolerance: Tolerated > 1 hr in chair for change of position. 3. Patient Education: Independent with HEP for home safety. Time: 0336                 Pt found in room with family and nurse at bedside. Pt is in bed directly post new drain placement for psoas abscess. Pt is shaking and reports that she is cold and not feeling well after procedure. Nurse reports that pt has developed L shoulder pain and is extremely uncomfortable after most recent procedure from which pt just returned.   Will follow up at next scheduled interval.      Tanner Marte PTA  5/10/2017

## 2017-05-10 NOTE — PROGRESS NOTES
Infectious Disease Progress Note    Requested by: Dr. Tracey Coleman, dr. Adrianna Fields    Reason: sepsis, acute paraplegia    Current abx Prior abx   Ceftriaxone since 4/24 Cefepime 4/20-4/21  vancomycin  4/20-4/24  Gentamicin 4/21-4/24  Metronidazole  4/20-4/24     Lines:   PICC RUE 4/26    Assessment :    77 y.o., right handed female, with an established history of hypertension, complete heart block with permanent pacemaker placed, diabetes mellitus, diabetic peripheral neuropathy, obesity, admitted to [facility] on 4/20/17. Clinical presentation consistent with  Sepsis (POA)  secondary to group B streptococcus bloodstream infection (positive blood cultures 4/20, negative blood cultures 4/21). Most likely source of bloodstream infection is infected right psoas hematoma/abscess. 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 No signs of neurological recovery on today's exam     2 week h/o pain at the site of abdominal pacemaker, tenderness at the site - however, patient doesn't have erythema at the site of pacemaker. No fluid collection noted at pacemaker pocket site per usg 4/24. Quick clearance of bacteremia would argue against endocarditis/endovascular infection. At this time risk of removal of pacemaker/general pocket change exceed the benefit. Discussed with cardiology. Would recommend close monitoring. Abdominal usg 4/24 doesn't reveal evidence of abscess/fluid collection at the site of pacemaker pocket. Enterococcus in urine cultures 4/20 likely colonizer    Acute renal failure: nephrology consult appreciated. Difficult to determine exact etiology of ARF at this time. Slight improvement in creatinine today. Low grade fever overnight - Ct scan 5/4 reveals increasing focal area within the right psoas muscle inferior to the previously drained area.  could represent either spread of infection inferiorly or intramuscular hematoma. S/p ct guided IR drain check - old drain removed. New drain placed inferiorly. No evidence of pneumonia    Persistent low grade fevers: d/d-  due to unidentified bleeding/undrained pockets of infection right psoas/colonized abdominal pacemaker. No worsening abdominal pain. Recommendations:    1. cont ceftriaxone tentatively till 6/4/17  2. Agree with attempts at ct guided drainage. 3. F/u cbc, clinically    Above plan was discussed in details with patient, dr. Suzi Baum. Please call me if any further questions or concerns. Will continue to participate in the care of this patient. subjective:    No increased right flank pain. Denies chills. Denies cp, sob. Unable to move legs. decreased pain at the site of pacemaker left abdomen. No nausea, vomiting. No new rash/itching/joint pain/back pain. Home Medication List    Details   gabapentin (NEURONTIN) 400 mg capsule Take 1 Cap by mouth two (2) times a day. Indications: NEUROPATHIC PAIN  Qty: 30 Cap, Refills: 0    Associated Diagnoses: Iliopsoas muscle hematoma, right, subsequent encounter      cholecalciferol (VITAMIN D3) 1,000 unit tablet Take 2 Tabs by mouth daily. Indications: PREVENTION OF VITAMIN D DEFICIENCY  Qty: 30 Tab, Refills: 0      SITagliptin (JANUVIA) 50 mg tablet Take 50 mg by mouth daily. Indications: type 2 diabetes mellitus      potassium chloride (KLOR-CON M20) 20 mEq tablet Take 20 mEq by mouth two (2) times a day. Indications: HYPOKALEMIA PREVENTION      ondansetron (ZOFRAN ODT) 4 mg disintegrating tablet Take 4 mg by mouth every eight (8) hours as needed for Nausea. cyclobenzaprine (FLEXERIL) 10 mg tablet Take 10 mg by mouth as needed for Muscle Spasm(s). Indications: MUSCLE SPASM      carvedilol (COREG) 6.25 mg tablet Take 1 Tab by mouth two (2) times daily (with meals).  Indications: hypertension  Qty: 30 Tab, Refills: 0    Associated Diagnoses: Benign hypertensive heart disease with systolic CHF, NYHA class 2 ([facility])      acetaminophen (TYLENOL) 325 mg tablet Take 2 Tabs by mouth every four (4) hours as needed (for fever or pain level less than 5/10). Indications: Fever, Pain  Qty: 30 Tab, Refills: 0    Associated Diagnoses: Iliopsoas muscle hematoma, right, subsequent encounter      allopurinol (ZYLOPRIM) 100 mg tablet Take 1 Tab by mouth daily. Indications: GOUT  Qty: 15 Tab, Refills: 0    Associated Diagnoses: Chronic gout, unspecified cause, unspecified site      insulin glargine (LANTUS) 100 unit/mL injection 15 Units by SubCUTAneous route nightly. Indications: type 2 diabetes mellitus  Qty: 1 Vial, Refills: 0    Associated Diagnoses: Type 2 diabetes mellitus with diabetic neuropathy, with long-term current use of insulin ([facility])      docusate sodium (COLACE) 100 mg capsule Take 1 Cap by mouth daily (after breakfast). Indications: Constipation  Qty: 15 Cap, Refills: 0      senna-docusate (PERICOLACE) 8.6-50 mg per tablet Take 2 Tabs by mouth daily (after dinner). Indications: Constipation  Qty: 30 Tab, Refills: 0      oxyCODONE-acetaminophen (PERCOCET 7.5) 7.5-325 mg per tablet Take 1 Tab by mouth every four (4) hours as needed (for pain level greater than 4/10). Max Daily Amount: 6 Tabs. Indications: Pain  Qty: 50 Tab, Refills: 0    Associated Diagnoses: Iliopsoas muscle hematoma, right, subsequent encounter      bumetanide (BUMEX) 1 mg tablet TAKE 1 TABLET TWICE A DAY  Qty: 180 Tab, Refills: 1      pravastatin (PRAVACHOL) 40 mg tablet Take 40 mg by mouth nightly. Indications: DYSLIPIDEMIA      ferrous sulfate 325 mg (65 mg iron) tablet Take 1 Tab by mouth two (2) times daily (with meals).   Qty: 30 Tab, Refills: 0      insulin aspart (NOVOLOG) 100 unit/mL injection INITIATE INSULIN CORRECTIVE PROTOCOL (HR): Normal Insulin Sensitivity  For Blood Sugar (mg/dL) of:    Less than 150 =   0 units          150 -199 =   2 units 200 -249 =   4 units 250 -299 =   6 units 300 -349 =   8 units 350 and above =   10 units If 2 glucose readings are above 200 mg/dL  Qty: 10 mL, Refills: 6             Current Facility-Administered Medications   Medication Dose Route Frequency    midazolam (VERSED) 1 mg/mL injection        fentaNYL citrate (PF) 50 mcg/mL injection        0.9% sodium chloride infusion  50 mL/hr IntraVENous CONTINUOUS    famotidine (PEPCID) tablet 20 mg  20 mg Oral DAILY    insulin lispro (HUMALOG) injection   SubCUTAneous AC&HS    0.9% sodium chloride infusion 250 mL  250 mL IntraVENous PRN    cefTRIAXone (ROCEPHIN) 2 g in 0.9% sodium chloride (MBP/ADV) 50 mL MBP  2 g IntraVENous Q24H    insulin glargine (LANTUS) injection 20 Units  20 Units SubCUTAneous DAILY    diphenhydrAMINE (BENADRYL) capsule 50 mg  50 mg Oral Q4H PRN    polyethylene glycol (MIRALAX) packet 17 g  17 g Oral TID    cholecalciferol (VITAMIN D3) tablet 2,000 Units  2,000 Units Oral DAILY    docusate sodium (COLACE) capsule 100 mg  100 mg Oral DAILY AFTER BREAKFAST    gabapentin (NEURONTIN) capsule 400 mg  400 mg Oral BID    oxyCODONE-acetaminophen (PERCOCET 7.5) 7.5-325 mg per tablet 1 Tab  1 Tab Oral Q4H PRN    pravastatin (PRAVACHOL) tablet 40 mg  40 mg Oral QHS    senna-docusate (PERICOLACE) 8.6-50 mg per tablet 2 Tab  2 Tab Oral PCD    acetaminophen (TYLENOL) tablet 500 mg  500 mg Oral Q6H PRN    allopurinol (ZYLOPRIM) tablet 100 mg  100 mg Oral DAILY    clotrimazole (MYCELEX) frances 10 mg  10 mg Oral 5XD    folic acid (FOLVITE) tablet 1 mg  1 mg Oral DAILY    ondansetron (ZOFRAN) injection 4 mg  4 mg IntraVENous Q8H PRN    glucose chewable tablet 16 g  16 g Oral PRN    glucagon (GLUCAGEN) injection 1 mg  1 mg IntraMUSCular PRN    dextrose (D50W) injection syrg 12.5-25 g  25-50 mL IntraVENous PRN       Allergies: Vancomycin; Ampicillin; Bactrim [sulfamethoxazole-trimethoprim]; Blueberry; Ciprofloxacin; Codeine; Crestor [rosuvastatin];  Darvocet a500 [propoxyphene n-acetaminophen]; Demerol [meperidine]; Levaquin [levofloxacin]; Lipitor [atorvastatin]; Magnesium oxide; Minocin [minocycline]; Pcn [penicillins]; Pravachol [pravastatin]; Sulfa (sulfonamide antibiotics); Ultracet [tramadol-acetaminophen]; Vicodin [hydrocodone-acetaminophen]; Vytorin 10-10 [ezetimibe-simvastatin]; and Percodan [oxycodone hcl-oxycodone-asa]    Temp (24hrs), Av.6 °F (37 °C), Min:97.5 °F (36.4 °C), Max:99.7 °F (37.6 °C)    Visit Vitals    /62 (BP 1 Location: Left arm, BP Patient Position: Supine)    Pulse 78    Temp 98.3 °F (36.8 °C)    Resp 20    Ht 5' 7\" (1.702 m)    Wt 116.6 kg (257 lb 2.2 oz)    SpO2 94%    Breastfeeding No    BMI 40.27 kg/m2       ROS: patient unable to communicate fluently. Detailed ros not feasible. Physical Exam:    General: Well developed, well nourished female laying on the bed AAOx3 NAD    General:   awake alert and oriented   HEENT:  Normocephalic, atraumatic, PERRL, EOMI, no scleral icterus or pallor; no conjunctival hemmohage;  nasal and oral mucous are moist and without evidence of lesions. Neck supple, no bruits. Lymph Nodes:   no cervical, axillary or inguinal adenopathy   Lungs:   non-labored, bilaterally clear to auscultation- no crackles wheezes rales or rhonchi   Heart:   s1 and s2 irregular; no rubs or gallops, no edema, + pedal pulses   Abdomen:  soft, non-distended, active bowel sounds, no hepatomegaly, no splenomegaly. no tenderness over the left abdominal pacemaker, no overlying erythema or fluctuance, new drain right lower abdomen   Genitourinary:  deferred   Extremities:   no clubbing, cyanosis; no joint effusions or swelling; muscle mass appropriate for age   Neurologic:  No gross focal sensory abnormalities; 5/5 muscle strength to upper extremities. 0/5 strength in lower extremities.   Cranial nerves intact                        Skin:  Surgical scars abdomen, left neck well healed   Back:   no paraspinal muscle guarding or rigidity, right CVA tenderness     Psychiatric:  No suicidal or homicidal ideations, appropriate mood and affect         Labs: Results:   Chemistry Recent Labs      05/10/17   0327  05/09/17   0404   GLU  92  78   NA  136  138   K  3.6  4.2   CL  101  103   CO2  27  30   BUN  20*  22*   CREA  1.77*  2.07*   CA  8.2*  8.1*   AGAP  8  5   BUCR  11*  11*   ALB   --   1.5*      CBC w/Diff Recent Labs      05/10/17   0327  05/09/17   0404  05/08/17   1342   WBC   --   6.9  7.4   RBC   --   3.34*  3.50*   HGB  8.9*  8.9*  9.2*   HCT  27.1*  27.1*  28.8*   PLT   --   164  151   GRANS   --   51  56   LYMPH   --   35  31   EOS   --   3  3      Microbiology No results for input(s): CULT in the last 72 hours.        RADIOLOGY:    All available imaging studies/reports in [facility] care for this admission were reviewed    Dr. Nikolay Cabrera, Infectious Disease Specialist  [phone]  May 10, 2017  3:49 PM

## 2017-05-10 NOTE — PROGRESS NOTES
TRANSFER - OUT REPORT:    Verbal report given to Nicole Chase RN(name) on Junior Durant  being transferred to (unit) for routine post - op       Report consisted of patients Situation, Background, Assessment and   Recommendations(SBAR). Information from the following report(s) SBAR, Kardex, Intake/Output and MAR was reviewed with the receiving nurse. Lines:   PICC Double Lumen 67/42/08 Right;Basilic (Active)   Central Line Being Utilized Yes 5/10/2017  5:48 AM   Criteria for Appropriate Use Limited/no vessel suitable for conventional peripheral access 5/10/2017  5:48 AM   Site Assessment Clean, dry, & intact 5/10/2017  5:48 AM   Phlebitis Assessment 0 5/10/2017  5:48 AM   Infiltration Assessment 0 5/10/2017  5:48 AM   Date of Last Dressing Change 05/03/17 5/10/2017  5:48 AM   Dressing Status Clean, dry, & intact 5/10/2017  5:48 AM   Action Taken Blood drawn 5/2/2017  4:35 PM   Dressing Type Disk with Chlorhexadine gluconate (CHG) 5/9/2017  1:54 AM   Hub Color/Line Status Flushed 5/9/2017  1:54 AM   Positive Blood Return (Site #1) No 5/10/2017  5:48 AM   Hub Color/Line Status Patent; Flushed 5/8/2017  8:00 AM   Positive Blood Return (Site #2) No 5/10/2017  5:48 AM   Alcohol Cap Used No 5/6/2017  8:00 PM        Opportunity for questions and clarification was provided.       Patient transported with:   [employer]

## 2017-05-10 NOTE — PROCEDURES
RADIOLOGY POST PROCEDURE NOTE     May 10, 2017       1:38 PM     Preoperative Diagnosis:   Right psoas muscle abscess. Postoperative Diagnosis:  Same. :  Dr. Elsy Ray    Assistant:  None. Type of Anesthesia: 1% plain lidocaine and IV moderate sedation with versed and Fentanyl    Procedure/Description:  CT guided right new psoas muscle drainage catheter placement and old drain removal.    Findings:   No bleeding. Estimated blood Loss:  Minimal    Specimen Removed:   no    Blood transfusions:  None. Implants:  8.5F Resolve APD to bulb suction. .    Complications: None    Condition: Stable    Discharge Plan:  continue present therapy    Yuliana Menjivar MD

## 2017-05-11 LAB
ANION GAP BLD CALC-SCNC: 9 MMOL/L (ref 3–18)
APPEARANCE UR: ABNORMAL
APTT PPP: 33.8 SEC (ref 23–36.4)
BASOPHILS # BLD AUTO: 0 K/UL (ref 0–0.1)
BASOPHILS # BLD: 0 % (ref 0–2)
BILIRUB UR QL: NEGATIVE
BUN SERPL-MCNC: 20 MG/DL (ref 7–18)
BUN/CREAT SERPL: 11 (ref 12–20)
CALCIUM SERPL-MCNC: 8.2 MG/DL (ref 8.5–10.1)
CHLORIDE SERPL-SCNC: 100 MMOL/L (ref 100–108)
CK MB CFR SERPL CALC: NORMAL % (ref 0–4)
CK MB SERPL-MCNC: <1 NG/ML (ref 5–25)
CK SERPL-CCNC: 43 U/L (ref 26–192)
CO2 SERPL-SCNC: 26 MMOL/L (ref 21–32)
COLOR UR: ABNORMAL
CREAT SERPL-MCNC: 1.79 MG/DL (ref 0.6–1.3)
DIFFERENTIAL METHOD BLD: ABNORMAL
EOSINOPHIL # BLD: 0 K/UL (ref 0–0.4)
EOSINOPHIL NFR BLD: 0 % (ref 0–5)
EPITH CASTS URNS QL MICRO: ABNORMAL /LPF (ref 0–5)
ERYTHROCYTE [DISTWIDTH] IN BLOOD BY AUTOMATED COUNT: 16.7 % (ref 11.6–14.5)
GLUCOSE BLD STRIP.AUTO-MCNC: 126 MG/DL (ref 70–110)
GLUCOSE BLD STRIP.AUTO-MCNC: 146 MG/DL (ref 70–110)
GLUCOSE BLD STRIP.AUTO-MCNC: 170 MG/DL (ref 70–110)
GLUCOSE BLD STRIP.AUTO-MCNC: 196 MG/DL (ref 70–110)
GLUCOSE SERPL-MCNC: 130 MG/DL (ref 74–99)
GLUCOSE UR STRIP.AUTO-MCNC: NEGATIVE MG/DL
HCT VFR BLD AUTO: 28.1 % (ref 35–45)
HGB BLD-MCNC: 9.2 G/DL (ref 12–16)
HGB UR QL STRIP: ABNORMAL
KETONES UR QL STRIP.AUTO: NEGATIVE MG/DL
LEUKOCYTE ESTERASE UR QL STRIP.AUTO: ABNORMAL
LYMPHOCYTES # BLD AUTO: 16 % (ref 21–52)
LYMPHOCYTES # BLD: 1.9 K/UL (ref 0.9–3.6)
MAGNESIUM SERPL-MCNC: 1.7 MG/DL (ref 1.6–2.6)
MCH RBC QN AUTO: 26.6 PG (ref 24–34)
MCHC RBC AUTO-ENTMCNC: 32.7 G/DL (ref 31–37)
MCV RBC AUTO: 81.2 FL (ref 74–97)
MONOCYTES # BLD: 1.1 K/UL (ref 0.05–1.2)
MONOCYTES NFR BLD AUTO: 9 % (ref 3–10)
NEUTS SEG # BLD: 8.7 K/UL (ref 1.8–8)
NEUTS SEG NFR BLD AUTO: 75 % (ref 40–73)
NITRITE UR QL STRIP.AUTO: NEGATIVE
PH UR STRIP: 5 [PH] (ref 5–8)
PLATELET # BLD AUTO: 202 K/UL (ref 135–420)
PMV BLD AUTO: 8.7 FL (ref 9.2–11.8)
POTASSIUM SERPL-SCNC: 3.9 MMOL/L (ref 3.5–5.5)
PROT UR STRIP-MCNC: 100 MG/DL
RBC # BLD AUTO: 3.46 M/UL (ref 4.2–5.3)
RBC #/AREA URNS HPF: ABNORMAL /HPF (ref 0–5)
RBC CASTS URNS QL MICRO: ABNORMAL /LPF
SODIUM SERPL-SCNC: 135 MMOL/L (ref 136–145)
SP GR UR REFRACTOMETRY: 1.02 (ref 1–1.03)
TROPONIN I SERPL-MCNC: 0.02 NG/ML (ref 0–0.04)
UROBILINOGEN UR QL STRIP.AUTO: 1 EU/DL (ref 0.2–1)
WBC # BLD AUTO: 11.8 K/UL (ref 4.6–13.2)
WBC URNS QL MICRO: ABNORMAL /HPF (ref 0–4)
YEAST URNS QL MICRO: ABNORMAL

## 2017-05-11 PROCEDURE — 74011250636 HC RX REV CODE- 250/636: Performed by: INTERNAL MEDICINE

## 2017-05-11 PROCEDURE — 74011250637 HC RX REV CODE- 250/637: Performed by: INTERNAL MEDICINE

## 2017-05-11 PROCEDURE — 97140 MANUAL THERAPY 1/> REGIONS: CPT

## 2017-05-11 PROCEDURE — 93970 EXTREMITY STUDY: CPT

## 2017-05-11 PROCEDURE — C1769 GUIDE WIRE: HCPCS

## 2017-05-11 PROCEDURE — 74011000250 HC RX REV CODE- 250: Performed by: INTERNAL MEDICINE

## 2017-05-11 PROCEDURE — 81001 URINALYSIS AUTO W/SCOPE: CPT | Performed by: INTERNAL MEDICINE

## 2017-05-11 PROCEDURE — C1880 VENA CAVA FILTER: HCPCS

## 2017-05-11 PROCEDURE — 94640 AIRWAY INHALATION TREATMENT: CPT

## 2017-05-11 PROCEDURE — 74011636320 HC RX REV CODE- 636/320: Performed by: SURGERY

## 2017-05-11 PROCEDURE — 74011250637 HC RX REV CODE- 250/637: Performed by: HOSPITALIST

## 2017-05-11 PROCEDURE — 74011250636 HC RX REV CODE- 250/636: Performed by: SURGERY

## 2017-05-11 PROCEDURE — 97165 OT EVAL LOW COMPLEX 30 MIN: CPT

## 2017-05-11 PROCEDURE — 76937 US GUIDE VASCULAR ACCESS: CPT

## 2017-05-11 PROCEDURE — 65660000000 HC RM CCU STEPDOWN

## 2017-05-11 PROCEDURE — 37191 INS ENDOVAS VENA CAVA FILTR: CPT

## 2017-05-11 PROCEDURE — 82962 GLUCOSE BLOOD TEST: CPT

## 2017-05-11 PROCEDURE — 77030027138 HC INCENT SPIROMETER -A

## 2017-05-11 PROCEDURE — 74011000250 HC RX REV CODE- 250: Performed by: UROLOGY

## 2017-05-11 PROCEDURE — 06H03DZ INSERTION OF INTRALUMINAL DEVICE INTO INFERIOR VENA CAVA, PERCUTANEOUS APPROACH: ICD-10-PCS | Performed by: SURGERY

## 2017-05-11 PROCEDURE — B5191ZZ FLUOROSCOPY OF INFERIOR VENA CAVA USING LOW OSMOLAR CONTRAST: ICD-10-PCS | Performed by: SURGERY

## 2017-05-11 PROCEDURE — 74011636637 HC RX REV CODE- 636/637: Performed by: HOSPITALIST

## 2017-05-11 PROCEDURE — 74011000258 HC RX REV CODE- 258: Performed by: INTERNAL MEDICINE

## 2017-05-11 PROCEDURE — 74011636637 HC RX REV CODE- 636/637: Performed by: INTERNAL MEDICINE

## 2017-05-11 PROCEDURE — 77030004530 HC CATH ANGI DX IMGR BSC -A

## 2017-05-11 PROCEDURE — 74011000250 HC RX REV CODE- 250: Performed by: SURGERY

## 2017-05-11 PROCEDURE — B51F1ZZ FLUOROSCOPY OF RIGHT PELVIC (ILIAC) VEINS USING LOW OSMOLAR CONTRAST: ICD-10-PCS | Performed by: SURGERY

## 2017-05-11 RX ORDER — FENTANYL CITRATE 50 UG/ML
12.5-1 INJECTION, SOLUTION INTRAMUSCULAR; INTRAVENOUS
Status: DISCONTINUED | OUTPATIENT
Start: 2017-05-11 | End: 2017-05-11 | Stop reason: HOSPADM

## 2017-05-11 RX ORDER — IPRATROPIUM BROMIDE AND ALBUTEROL SULFATE 2.5; .5 MG/3ML; MG/3ML
3 SOLUTION RESPIRATORY (INHALATION)
Status: DISCONTINUED | OUTPATIENT
Start: 2017-05-11 | End: 2017-05-22

## 2017-05-11 RX ORDER — HEPARIN SODIUM 200 [USP'U]/100ML
500 INJECTION, SOLUTION INTRAVENOUS ONCE
Status: COMPLETED | OUTPATIENT
Start: 2017-05-11 | End: 2017-05-11

## 2017-05-11 RX ORDER — HEPARIN SODIUM 200 [USP'U]/100ML
500 INJECTION, SOLUTION INTRAVENOUS ONCE
Status: DISCONTINUED | OUTPATIENT
Start: 2017-05-11 | End: 2017-05-11

## 2017-05-11 RX ORDER — UREA 10 %
1 LOTION (ML) TOPICAL 2 TIMES DAILY
Status: DISCONTINUED | OUTPATIENT
Start: 2017-05-11 | End: 2017-05-25 | Stop reason: HOSPADM

## 2017-05-11 RX ORDER — MIRTAZAPINE 15 MG/1
15 TABLET, ORALLY DISINTEGRATING ORAL
Status: DISCONTINUED | OUTPATIENT
Start: 2017-05-11 | End: 2017-05-15

## 2017-05-11 RX ORDER — MAGNESIUM SULFATE 1 G/100ML
1 INJECTION INTRAVENOUS ONCE
Status: COMPLETED | OUTPATIENT
Start: 2017-05-11 | End: 2017-05-11

## 2017-05-11 RX ORDER — LIDOCAINE HYDROCHLORIDE 10 MG/ML
1-30 INJECTION, SOLUTION EPIDURAL; INFILTRATION; INTRACAUDAL; PERINEURAL
Status: DISCONTINUED | OUTPATIENT
Start: 2017-05-11 | End: 2017-05-11 | Stop reason: HOSPADM

## 2017-05-11 RX ORDER — HEPARIN SODIUM 1000 [USP'U]/ML
80 INJECTION, SOLUTION INTRAVENOUS; SUBCUTANEOUS ONCE
Status: DISCONTINUED | OUTPATIENT
Start: 2017-05-11 | End: 2017-05-11

## 2017-05-11 RX ORDER — LIDOCAINE HYDROCHLORIDE 10 MG/ML
1-30 INJECTION, SOLUTION EPIDURAL; INFILTRATION; INTRACAUDAL; PERINEURAL
Status: DISCONTINUED | OUTPATIENT
Start: 2017-05-11 | End: 2017-05-11

## 2017-05-11 RX ADMIN — LACTOBACILLUS TAB 1 TABLET: TAB at 17:38

## 2017-05-11 RX ADMIN — DOCUSATE SODIUM 100 MG: 100 CAPSULE, LIQUID FILLED ORAL at 08:10

## 2017-05-11 RX ADMIN — OXYCODONE HYDROCHLORIDE AND ACETAMINOPHEN 1 TABLET: 7.5; 325 TABLET ORAL at 06:05

## 2017-05-11 RX ADMIN — MIRTAZAPINE 15 MG: 15 TABLET, ORALLY DISINTEGRATING ORAL at 21:56

## 2017-05-11 RX ADMIN — MAGNESIUM SULFATE IN DEXTROSE 1 G: 10 INJECTION, SOLUTION INTRAVENOUS at 15:30

## 2017-05-11 RX ADMIN — IOPAMIDOL 10 ML: 612 INJECTION, SOLUTION INTRAVENOUS at 15:08

## 2017-05-11 RX ADMIN — HEPARIN SODIUM 1000 UNITS: 200 INJECTION, SOLUTION INTRAVENOUS at 15:08

## 2017-05-11 RX ADMIN — FOLIC ACID 1 MG: 1 TABLET ORAL at 08:12

## 2017-05-11 RX ADMIN — INSULIN GLARGINE 20 UNITS: 100 INJECTION, SOLUTION SUBCUTANEOUS at 08:07

## 2017-05-11 RX ADMIN — GABAPENTIN 400 MG: 400 CAPSULE ORAL at 17:38

## 2017-05-11 RX ADMIN — OXYCODONE HYDROCHLORIDE AND ACETAMINOPHEN 1 TABLET: 7.5; 325 TABLET ORAL at 21:56

## 2017-05-11 RX ADMIN — OXYCODONE HYDROCHLORIDE AND ACETAMINOPHEN 1 TABLET: 7.5; 325 TABLET ORAL at 15:28

## 2017-05-11 RX ADMIN — INSULIN LISPRO 3 UNITS: 100 INJECTION, SOLUTION INTRAVENOUS; SUBCUTANEOUS at 11:44

## 2017-05-11 RX ADMIN — PRAVASTATIN SODIUM 40 MG: 20 TABLET ORAL at 21:56

## 2017-05-11 RX ADMIN — GABAPENTIN 400 MG: 400 CAPSULE ORAL at 08:10

## 2017-05-11 RX ADMIN — IPRATROPIUM BROMIDE AND ALBUTEROL SULFATE 3 ML: 2.5; .5 SOLUTION RESPIRATORY (INHALATION) at 12:07

## 2017-05-11 RX ADMIN — ALLOPURINOL 100 MG: 100 TABLET ORAL at 08:10

## 2017-05-11 RX ADMIN — Medication 2 TABLET: at 17:38

## 2017-05-11 RX ADMIN — INSULIN LISPRO 3 UNITS: 100 INJECTION, SOLUTION INTRAVENOUS; SUBCUTANEOUS at 16:34

## 2017-05-11 RX ADMIN — IPRATROPIUM BROMIDE AND ALBUTEROL SULFATE 3 ML: 2.5; .5 SOLUTION RESPIRATORY (INHALATION) at 08:51

## 2017-05-11 RX ADMIN — CHOLECALCIFEROL TAB 25 MCG (1000 UNIT) 2000 UNITS: 25 TAB at 08:10

## 2017-05-11 RX ADMIN — FAMOTIDINE 20 MG: 20 TABLET ORAL at 08:10

## 2017-05-11 RX ADMIN — FENTANYL CITRATE 25 MCG: 50 INJECTION INTRAMUSCULAR; INTRAVENOUS at 15:07

## 2017-05-11 RX ADMIN — Medication 10 ML: at 21:57

## 2017-05-11 RX ADMIN — IPRATROPIUM BROMIDE AND ALBUTEROL SULFATE 3 ML: 2.5; .5 SOLUTION RESPIRATORY (INHALATION) at 20:38

## 2017-05-11 RX ADMIN — Medication 10 ML: at 14:12

## 2017-05-11 RX ADMIN — CEFTRIAXONE SODIUM 2 G: 2 INJECTION, POWDER, FOR SOLUTION INTRAMUSCULAR; INTRAVENOUS at 10:28

## 2017-05-11 RX ADMIN — LIDOCAINE HYDROCHLORIDE 10 ML: 10 INJECTION, SOLUTION EPIDURAL; INFILTRATION; INTRACAUDAL; PERINEURAL at 15:06

## 2017-05-11 RX ADMIN — IPRATROPIUM BROMIDE AND ALBUTEROL SULFATE 3 ML: .5; 3 SOLUTION RESPIRATORY (INHALATION) at 00:50

## 2017-05-11 NOTE — PROGRESS NOTES
Problem: Self Care Deficits Care Plan (Adult)  Goal: *Acute Goals and Plan of Care (Insert Text)  Occupational Therapy Goals  Initiated 5/11/2017 within 7 day(s). 1. Patient will demonstrate no shoulder pain in preparation for her efficient completion of her self care  2. Patient will maneuver in bed with min assist in preparation for her participation in her self care routine  3. Patient will independently perform shoulder program in preparation for her participation in her self care routine  4. Patient will tolerate supported sitting and progress to unsupported sitting at 90 in preparation for her participation in her self care routine  OCCUPATIONAL THERAPY EVALUATION     Patient: Antoine Angulo (3 y.o. female)  Date: 5/11/2017  Primary Diagnosis: Acute paraplegia  Acute paraplegia (HCC)   Precautions:  Fall, Skin      ASSESSMENT :  Based on the objective data described below, the patient presents with anxiety, shoulder pain and decreased tolerance of functional mobility and any positional change. Patient will benefit from skilled intervention to address the above impairments.   Patients rehabilitation potential is considered to be Fair  Factors which may influence rehabilitation potential include:   [ ]             None noted  [X]             Mental ability/status  [X]             Medical condition  [ ]             Home/family situation and support systems  [ ]             Safety awareness  [ ]             Pain tolerance/management  [ ]             Other:        PLAN :  Recommendations and Planned Interventions:  [X]               Self Care Training                  [X]        Therapeutic Activities  [ ]               Functional Mobility Training    [ ]        Cognitive Retraining  [X]               Therapeutic Exercises           [ ]        Endurance Activities  [X]               Balance Training                   [X]        Neuromuscular Re-Education  [ ]               Visual/Perceptual Training     [ ] Home Safety Training  [X]               Patient Education                 [X]        Family Training/Education  [ ]               Other (comment):     Frequency/Duration: Patient will be followed by occupational therapy 1-2 times per day/4-7 days per week to address goals. Discharge Recommendations: Naveen Dixon  Further Equipment Recommendations for Discharge: N/A       PATIENT COMPLEXITY      Eval Complexity: History: LOW Complexity : Brief history review ; Examination: LOW Complexity : 1-3 performance deficits relating to physical, cognitive , or psychosocial skils that result in activity limitations and / or participation restrictions ; Decision Making:LOW Complexity : No comorbidities that affect functional and no verbal or physical assistance needed to complete eval tasks  Assessment: LOW Complexity       G-CODES:      Self Care  Current  CL= 60-79%   Goal  CJ= 20-39%. The severity rating is based on the Level of Assistance required for Functional Mobility and ADLs. SUBJECTIVE:   Patient stated I was so ready to go home.  she is referring to her readmission to the hospital following her rehab stay      OBJECTIVE DATA SUMMARY:       Past Medical History:   Diagnosis Date    Acute paraplegia ([facility] 4/20/2017    Benign hypertensive heart disease with systolic CHF, NYHA class 2 ([facility] 9/5/2012    Biventricular implantable cardioverter-defibrillator in situ 04/28/2005     Upgraded to BiV AICD; gen change 4/2008; pocket revision 10/2009; Abdominal - done on 8/22/2012 by Dr. Mariano Devi Cardiac cath 08/15/1996     Patent coronaries. Elev LVEDP. EF 50-55%.  Cardiac echocardiogram 06/23/2015     Ltd study. EF 45-50%. Mild, diffuse hypk. Severe apical hypk. No mass or thrombus was clearly identified, although imaging was suboptimal.      Cardiac nuclear imaging test 06/19/2015     Fixed distal apical, distal septal defect more likely due to RV pacing than prior infarct. No ischemia. EF 46%. RWMA c/w RV pacing. Nondiagnostic EKG on pharm stress test.      Cardiovascular lower extremity venous duplex 2012     Acute, non-occlusive DVT in CFV on right. No DVT on left. No superficial thrombosis bilaterally.  Cardiovascular upper extremity venous duplex 2012     DVT in axillary vein on left. Left subclavian was not visualized.     Chronic anemia 2012    Chronic systolic heart failure (HCC)      Decreased calculated glomerular filtration rate (GFR) 3/30/2017     Calculated GFR equivalent to that of CKD stage 3 = 30-59 ml/min    Diabetic neuropathy associated with type 2 diabetes mellitus ([address] 2011    Difficult airway for intubation 2012     see anesthesia airway note    Dyslipidemia 2011    Gout      History of complete heart block 2011    History of Coumadin therapy       Anticoagulation for DVT of the LUE; Discontinued on 3/30/2017    History of deep venous thrombosis 2012     Left upper extremity    History of pyelonephritis 3/30/2017    Left bundle branch block (LBBB) on electrocardiogram 2011    Nonischemic cardiomyopathy ([address] 2011    Obesity (BMI 35.0-39.9 without comorbidity) ([address] 3/13/2017    Obstructive sleep apnea on CPAP 2012    Psoas abscess, right ([address] 2017    Psoas hematoma, right, secondary to anticoagulant therapy 3/30/2017    Type 2 diabetes mellitus with diabetic neuropathy ([address] 2011     Past Surgical History:   Procedure Laterality Date    HX CARPAL TUNNEL RELEASE        right     HX CHOLECYSTECTOMY       HX HYSTERECTOMY   1973    HX OTHER SURGICAL   2012     AICD revision    HX PACEMAKER   2005     [employer] AICD     Prior Level of Function/Home Situation:   Home Situation  Home Environment: Private residence  # Steps to Enter: 3  One/Two Story Residence: One story  Living Alone: No  Support Systems: Child(lea), Family member(s), [place] [facility] [place] [facility] community, Friends \ neighbors, Spouse/Significant Other/Partner  Patient Expects to be Discharged to[de-identified] Unknown  Current DME Used/Available at Home: Cane, straight, Glucometer  [X]  Right hand dominant          [ ]  Left hand dominant  Cognitive/Behavioral Status:  Neurologic State: Alert  Orientation Level: Oriented X4  Skin: bruising noted on left forearm  Edema: no extremity edema noted  Vision/Perceptual:     tracking is [place] [employer] [facility]     Coordination:   both hands are WFL; shoulders GM skills are limited secondary to her pain   Balance:   she declined any upright sitting (unsupported) and tolerated 90' supported sitting X 5 seconds only  Strength:  Bilateral strength:  Shoulders: 3/5 with 8/10 pain; distal strength is 4/5   Tone & Sensation:  BUE's WFL   Range of Motion:  BUE's AROM is WFL   Functional Mobility and Transfers for ADLs:  Bed Mobility:   total assist (secondary to her anxiety limiting her attempting)   ADL Assessment:   self feeding: independent (simulated)  Grooming: set up (simulated)  UB bathing/dressing: modified independent (simulated)  LB bathing/dressing: total assist this morning; she has adaptive equipment from her rehab stay and she knows how to use it   Therapeutic Exercise:  Soft tissue mobilization and scapular setting/strengthening program. Following this, her pain improved from 8/10 in BUE's to 1/10 in LUE and 5/10 RUE  Pain:  As noted directly above  Activity Tolerance:   No SOB noted (she is on O2) her anxiety and anticipation of pain  Please refer to the flowsheet for vital signs taken during this treatment. After treatment:   [ ] Patient left in no apparent distress sitting up in chair  [ ] Patient left in no apparent distress in bed  [X] Call bell left within reach  [ ] Nursing notified  [ ] Caregiver present  [ ] Bed alarm activated      COMMUNICATION/EDUCATION:   [ ] Home safety education was provided and the patient/caregiver indicated understanding.   [ ] Patient/family have participated as able in goal setting and plan of care. [X] Patient/family agree to work toward stated goals and plan of care. [ ] Patient understands intent and goals of therapy, but is neutral about his/her participation. [ ] Patient is unable to participate in goal setting and plan of care.      Thank you for this referral.  Myrtice Hair, OTR/L  Time Calculation: 26 mins

## 2017-05-11 NOTE — PROGRESS NOTES
Infectious Disease Progress Note    Requested by: Dr. Lazarus Bollard, dr. Andriy Munguia    Reason: sepsis, acute paraplegia    Current abx Prior abx   Ceftriaxone since 4/24 Cefepime 4/20-4/21  vancomycin  4/20-4/24  Gentamicin 4/21-4/24  Metronidazole  4/20-4/24     Lines:   PICC RUE 4/26    Assessment :    77 y.o., right handed female, with an established history of hypertension, complete heart block with permanent pacemaker placed, diabetes mellitus, diabetic peripheral neuropathy, obesity, admitted to [facility] on 4/20/17. Clinical presentation consistent with  Sepsis (POA)  secondary to group B streptococcus bloodstream infection (positive blood cultures 4/20, negative blood cultures 4/21). Most likely source of bloodstream infection is infected right psoas hematoma/abscess. 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 No signs of neurological recovery on today's exam     2 week h/o pain at the site of abdominal pacemaker, tenderness at the site - however, patient doesn't have erythema at the site of pacemaker. No fluid collection noted at pacemaker pocket site per usg 4/24. Quick clearance of bacteremia would argue against endocarditis/endovascular infection. At this time risk of removal of pacemaker/general pocket change exceed the benefit. Discussed with cardiology. Would recommend close monitoring. Abdominal usg 4/24 doesn't reveal evidence of abscess/fluid collection at the site of pacemaker pocket. Enterococcus in urine cultures 4/20 likely colonizer    Acute renal failure: nephrology consult appreciated. Difficult to determine exact etiology of ARF at this time. Slight improvement in creatinine today. Low grade fever overnight - Ct scan 5/4 reveals increasing focal area within the right psoas muscle inferior to the previously drained area.  could represent either spread of infection inferiorly or intramuscular hematoma. S/p ct guided IR drain check - old drain removed. New drain placed inferiorly on 5/10    No evidence of pneumonia    High grade fevers with tm:102 last pm - ?due to transiently increased inflammation due to right psoas drain placement. No clinical evidence of pneumonia. Bilateral opacities on cxr could be atelectasis. Rule out cystitis (unfortunately difficult to make clinical diagnosis due to neurological deficit)    Recommendations:    1. cont ceftriaxone tentatively till 6/4/17  2. Monitor clinically to determine if patient will need any further intervention for infected psoas hematoma  3. Obtain UA  4. Monitor temp, cbc    Above plan was discussed in details with patient. Please call me if any further questions or concerns. Will continue to participate in the care of this patient. subjective:    No increased right flank pain. Denies chills. Denies cp, sob. Unable to move legs. decreased pain at the site of pacemaker left abdomen. No nausea, vomiting. No new rash/itching/joint pain/back pain. Home Medication List    Details   gabapentin (NEURONTIN) 400 mg capsule Take 1 Cap by mouth two (2) times a day. Indications: NEUROPATHIC PAIN  Qty: 30 Cap, Refills: 0    Associated Diagnoses: Iliopsoas muscle hematoma, right, subsequent encounter      cholecalciferol (VITAMIN D3) 1,000 unit tablet Take 2 Tabs by mouth daily. Indications: PREVENTION OF VITAMIN D DEFICIENCY  Qty: 30 Tab, Refills: 0      SITagliptin (JANUVIA) 50 mg tablet Take 50 mg by mouth daily. Indications: type 2 diabetes mellitus      potassium chloride (KLOR-CON M20) 20 mEq tablet Take 20 mEq by mouth two (2) times a day. Indications: HYPOKALEMIA PREVENTION      ondansetron (ZOFRAN ODT) 4 mg disintegrating tablet Take 4 mg by mouth every eight (8) hours as needed for Nausea. cyclobenzaprine (FLEXERIL) 10 mg tablet Take 10 mg by mouth as needed for Muscle Spasm(s).  Indications: MUSCLE SPASM      carvedilol (COREG) 6.25 mg tablet Take 1 Tab by mouth two (2) times daily (with meals). Indications: hypertension  Qty: 30 Tab, Refills: 0    Associated Diagnoses: Benign hypertensive heart disease with systolic CHF, NYHA class 2 (HCC)      acetaminophen (TYLENOL) 325 mg tablet Take 2 Tabs by mouth every four (4) hours as needed (for fever or pain level less than 5/10). Indications: Fever, Pain  Qty: 30 Tab, Refills: 0    Associated Diagnoses: Iliopsoas muscle hematoma, right, subsequent encounter      allopurinol (ZYLOPRIM) 100 mg tablet Take 1 Tab by mouth daily. Indications: GOUT  Qty: 15 Tab, Refills: 0    Associated Diagnoses: Chronic gout, unspecified cause, unspecified site      insulin glargine (LANTUS) 100 unit/mL injection 15 Units by SubCUTAneous route nightly. Indications: type 2 diabetes mellitus  Qty: 1 Vial, Refills: 0    Associated Diagnoses: Type 2 diabetes mellitus with diabetic neuropathy, with long-term current use of insulin ([facility])      docusate sodium (COLACE) 100 mg capsule Take 1 Cap by mouth daily (after breakfast). Indications: Constipation  Qty: 15 Cap, Refills: 0      senna-docusate (PERICOLACE) 8.6-50 mg per tablet Take 2 Tabs by mouth daily (after dinner). Indications: Constipation  Qty: 30 Tab, Refills: 0      oxyCODONE-acetaminophen (PERCOCET 7.5) 7.5-325 mg per tablet Take 1 Tab by mouth every four (4) hours as needed (for pain level greater than 4/10). Max Daily Amount: 6 Tabs. Indications: Pain  Qty: 50 Tab, Refills: 0    Associated Diagnoses: Iliopsoas muscle hematoma, right, subsequent encounter      bumetanide (BUMEX) 1 mg tablet TAKE 1 TABLET TWICE A DAY  Qty: 180 Tab, Refills: 1      pravastatin (PRAVACHOL) 40 mg tablet Take 40 mg by mouth nightly. Indications: DYSLIPIDEMIA      ferrous sulfate 325 mg (65 mg iron) tablet Take 1 Tab by mouth two (2) times daily (with meals).   Qty: 30 Tab, Refills: 0      insulin aspart (NOVOLOG) 100 unit/mL injection INITIATE INSULIN CORRECTIVE PROTOCOL (HR): Normal Insulin Sensitivity  For Blood Sugar (mg/dL) of:    Less than 150 =   0 units          150 -199 =   2 units 200 -249 =   4 units 250 -299 =   6 units 300 -349 =   8 units 350 and above =   10 units If 2 glucose readings are above 200 mg/dL  Qty: 10 mL, Refills: 6             Current Facility-Administered Medications   Medication Dose Route Frequency    albuterol-ipratropium (DUO-NEB) 2.5 MG-0.5 MG/3 ML  3 mL Nebulization Q4HWA RT    mirtazapine (REMERON SOL-TAB) disintegrating tablet 15 mg  15 mg Oral QHS    sodium chloride (NS) flush 5-10 mL  5-10 mL IntraVENous Q8H    naloxone (NARCAN) injection 0.1 mg  0.1 mg IntraVENous Multiple    acetaminophen (TYLENOL) tablet 650 mg  650 mg Oral Q4H PRN    0.9% sodium chloride infusion  50 mL/hr IntraVENous CONTINUOUS    famotidine (PEPCID) tablet 20 mg  20 mg Oral DAILY    insulin lispro (HUMALOG) injection   SubCUTAneous AC&HS    0.9% sodium chloride infusion 250 mL  250 mL IntraVENous PRN    cefTRIAXone (ROCEPHIN) 2 g in 0.9% sodium chloride (MBP/ADV) 50 mL MBP  2 g IntraVENous Q24H    insulin glargine (LANTUS) injection 20 Units  20 Units SubCUTAneous DAILY    diphenhydrAMINE (BENADRYL) capsule 50 mg  50 mg Oral Q4H PRN    polyethylene glycol (MIRALAX) packet 17 g  17 g Oral TID    cholecalciferol (VITAMIN D3) tablet 2,000 Units  2,000 Units Oral DAILY    docusate sodium (COLACE) capsule 100 mg  100 mg Oral DAILY AFTER BREAKFAST    gabapentin (NEURONTIN) capsule 400 mg  400 mg Oral BID    oxyCODONE-acetaminophen (PERCOCET 7.5) 7.5-325 mg per tablet 1 Tab  1 Tab Oral Q4H PRN    pravastatin (PRAVACHOL) tablet 40 mg  40 mg Oral QHS    senna-docusate (PERICOLACE) 8.6-50 mg per tablet 2 Tab  2 Tab Oral PCD    acetaminophen (TYLENOL) tablet 500 mg  500 mg Oral Q6H PRN    allopurinol (ZYLOPRIM) tablet 100 mg  100 mg Oral DAILY    folic acid (FOLVITE) tablet 1 mg  1 mg Oral DAILY    ondansetron (ZOFRAN) injection 4 mg  4 mg IntraVENous Q8H PRN    glucose chewable tablet 16 g  16 g Oral PRN    glucagon (GLUCAGEN) injection 1 mg  1 mg IntraMUSCular PRN    dextrose (D50W) injection syrg 12.5-25 g  25-50 mL IntraVENous PRN       Allergies: Vancomycin; Ampicillin; Bactrim [sulfamethoxazole-trimethoprim]; Blueberry; Ciprofloxacin; Codeine; Crestor [rosuvastatin]; Darvocet a500 [propoxyphene n-acetaminophen]; Demerol [meperidine]; Levaquin [levofloxacin]; Lipitor [atorvastatin]; Magnesium oxide; Minocin [minocycline]; Pcn [penicillins]; Pravachol [pravastatin]; Sulfa (sulfonamide antibiotics); Ultracet [tramadol-acetaminophen]; Vicodin [hydrocodone-acetaminophen]; Vytorin 10-10 [ezetimibe-simvastatin]; and Percodan [oxycodone hcl-oxycodone-asa]    Temp (24hrs), Av.5 °F (37.5 °C), Min:97.6 °F (36.4 °C), Max:102.7 °F (39.3 °C)    Visit Vitals    /63 (BP 1 Location: Left arm, BP Patient Position: Supine)    Pulse 86    Temp 98.6 °F (37 °C)    Resp 18    Ht 5' 7\" (1.702 m)    Wt 117.2 kg (258 lb 4.8 oz)    SpO2 97%    Breastfeeding No    BMI 40.46 kg/m2       ROS: patient unable to communicate fluently. Detailed ros not feasible. Physical Exam:    General: Well developed, well nourished female laying on the bed AAOx3 NAD    General:   awake alert and oriented   HEENT:  Normocephalic, atraumatic, PERRL, EOMI, no scleral icterus or pallor; no conjunctival hemmohage;  nasal and oral mucous are moist and without evidence of lesions. Neck supple, no bruits. Lymph Nodes:   no cervical, axillary or inguinal adenopathy   Lungs:   non-labored, bilaterally clear to auscultation- no crackles wheezes rales or rhonchi   Heart:   s1 and s2 irregular; no rubs or gallops, no edema, + pedal pulses   Abdomen:  soft, non-distended, active bowel sounds, no hepatomegaly, no splenomegaly.  no tenderness over the left abdominal pacemaker, no overlying erythema or fluctuance, new drain right lower abdomen   Genitourinary:  deferred Extremities:   no clubbing, cyanosis; no joint effusions or swelling; muscle mass appropriate for age   Neurologic:  No gross focal sensory abnormalities; 5/5 muscle strength to upper extremities. 0/5 strength in lower extremities.   Cranial nerves intact                        Skin:  Surgical scars abdomen, left neck well healed   Back:   no paraspinal muscle guarding or rigidity, right CVA tenderness     Psychiatric:  No suicidal or homicidal ideations, appropriate mood and affect         Labs: Results:   Chemistry Recent Labs      05/10/17   2335  05/10/17   0327  05/09/17   0404   GLU  130*  92  78   NA  135*  136  138   K  3.9  3.6  4.2   CL  100  101  103   CO2  26  27  30   BUN  20*  20*  22*   CREA  1.79*  1.77*  2.07*   CA  8.2*  8.2*  8.1*   AGAP  9  8  5   BUCR  11*  11*  11*   ALB   --    --   1.5*      CBC w/Diff Recent Labs      05/10/17   2335  05/10/17   0327  05/09/17   0404  05/08/17   1342   WBC  11.8   --   6.9  7.4   RBC  3.46*   --   3.34*  3.50*   HGB  9.2*  8.9*  8.9*  9.2*   HCT  28.1*  27.1*  27.1*  28.8*   PLT  202   --   164  151   GRANS  75*   --   51  56   LYMPH  16*   --   35  31   EOS  0   --   3  3      Microbiology Recent Labs      05/10/17   2135  05/10/17   2130   CULT  NO GROWTH AFTER 9 HOURS  NO GROWTH AFTER 9 HOURS          RADIOLOGY:    All available imaging studies/reports in [facility] for this admission were reviewed    Dr. Jose Dasilva, Infectious Disease Specialist  [phone]  May 11, 2017  3:49 PM

## 2017-05-11 NOTE — INTERVAL H&P NOTE
H&P Update:  Junior Durant was seen and examined. History and physical has been reviewed. The patient has been examined.  There have been no significant clinical changes since the completion of the originally dated History and Physical.    Signed By: Cata Bazzi MD     May 11, 2017 2:35 PM

## 2017-05-11 NOTE — PROCEDURES
[facility]  *** FINAL REPORT ***    Name: Deana Hutchinson  MRN: [id number]    Inpatient  : 1950  HIS Order #: [id number]  [address] [facility] Visit #: [id number]  Date: 11 May 2017    TYPE OF TEST: Peripheral Venous Testing    REASON FOR TEST  Pain in limb, Limb swelling    Right Leg:-  Deep venous thrombosis:           Yes  Proximal extent of thrombus:      Popliteal At The Knee  Superficial venous thrombosis:    No  Deep venous insufficiency:        Not examined  Superficial venous insufficiency: Not examined    Left Leg:-  Deep venous thrombosis:           Yes  Proximal extent of thrombus:      Common Femoral  Superficial venous thrombosis:    No  Deep venous insufficiency:        Not examined  Superficial venous insufficiency: Not examined      INTERPRETATION/FINDINGS  Right leg :  1. Acute, non-occlusive deep venous thrombosis identified in the  popliteal(fossa) vein. 2. Deep veins visualized include the common femoral, femoral,  popliteal, posterior tibial and peroneal veins. 3. Superficial veins visualized include the great saphenous vein. 4. No evidence of superficial thrombosis detected. 5. Normal multiphasic flow in the posterior tibial artery. Left leg :  1. Acute, non-occlusive deep vein thrombus seen in the common femoral  vein. 2. Deep veins visualized include the common femoral, femoral,  popliteal, posterior tibial and peroneal veins. 3. Superficial vein(s) visualized include the great saphenous vein. 4. No evidence of superficial thrombosis detected. 5. Normal multiphasic flow in the posterior tibial artery. ADDITIONAL COMMENTS  Positive results called to DANEIL Dhillon on [address]. I have personally reviewed the data relevant to the interpretation of  this  study. TECHNOLOGIST: ALCON Handy, RVS  Signed: 2017 01:45 PM    PHYSICIAN: Rajesh Enriquez D.O.   Signed: 2017 03:55 PM

## 2017-05-11 NOTE — ROUTINE PROCESS
TRANSFER - OUT REPORT:    Verbal report given to [address] [place] [address],[address] RN(name) on Clemencia Rota  being transferred to [address] [place] [address](unit) for routine progression of care       Report consisted of patients Situation, Background, Assessment and   Recommendations(SBAR). Information from the following report(s) SBAR, Kardex, Procedure Summary and MAR was reviewed with the receiving nurse. Lines:   PICC Double Lumen 76/73/62 Right;Basilic (Active)   Central Line Being Utilized Yes 5/11/2017  8:14 AM   Criteria for Appropriate Use Long term IV/antibiotic administration 5/11/2017  8:14 AM   Site Assessment Clean, dry, & intact 5/11/2017  8:14 AM   Phlebitis Assessment 0 5/11/2017  8:14 AM   Infiltration Assessment 0 5/11/2017  8:14 AM   Date of Last Dressing Change 05/03/17 5/11/2017  8:14 AM   Dressing Status Clean, dry, & intact 5/11/2017  8:14 AM   Action Taken Blood drawn 5/2/2017  4:35 PM   Dressing Type Disk with Chlorhexadine gluconate (CHG) 5/11/2017  8:14 AM   Hub Color/Line Status Patent; Flushed 5/11/2017  8:14 AM   Positive Blood Return (Site #1) Yes 5/11/2017  8:14 AM   Hub Color/Line Status Patent; Flushed 5/11/2017  8:14 AM   Positive Blood Return (Site #2) Yes 5/11/2017  8:14 AM   Alcohol Cap Used No 5/6/2017  8:00 PM        Opportunity for questions and clarification was provided.       Patient transported with:   [employer]

## 2017-05-11 NOTE — ROUTINE PROCESS
Bedside and Verbal shift change report given to Flavia Marte RN (oncoming nurse) by Selam Francis RN (offgoing nurse). Report included the following information SBAR.

## 2017-05-11 NOTE — PROGRESS NOTES
Py has temp of 102. Dr Racquel Parker notified. Blood cultures x2 and tylenol ordered. Pt given a percocet for pain and one tylenol.

## 2017-05-11 NOTE — H&P (VIEW-ONLY)
Surgery Consult      Patient: Karen Mojica MRN: [id number]  CSN: [id number]      YOB: 1950    Age: 77 y.o. Sex: female      DOA: 4/20/2017       HPI:     Karen Mojica is a 77 y.o. female who presents with psoas abscess with DVT and paralysis. Needs IVC filter. Past Medical History:   Diagnosis Date    Acute paraplegia ([facility] [address] 4/20/2017    Benign hypertensive heart disease with systolic CHF, NYHA class 2 ([facility] [address] 9/5/2012    Biventricular implantable cardioverter-defibrillator in situ 04/28/2005    Upgraded to BiV AICD; gen change 4/2008; pocket revision 10/2009; Abdominal - done on 8/22/2012 by Dr. Kleber Villagran Cardiac cath 08/15/1996    Patent coronaries. Elev LVEDP. EF 50-55%.  Cardiac echocardiogram 06/23/2015    Ltd study. EF 45-50%. Mild, diffuse hypk. Severe apical hypk. No mass or thrombus was clearly identified, although imaging was suboptimal.      Cardiac nuclear imaging test 06/19/2015    Fixed distal apical, distal septal defect more likely due to RV pacing than prior infarct. No ischemia. EF 46%. RWMA c/w RV pacing. Nondiagnostic EKG on pharm stress test.      Cardiovascular lower extremity venous duplex 09/04/2012    Acute, non-occlusive DVT in CFV on right. No DVT on left. No superficial thrombosis bilaterally.  Cardiovascular upper extremity venous duplex 08/27/2012    DVT in axillary vein on left. Left subclavian was not visualized.     Chronic anemia 9/5/2012    Chronic systolic heart failure (HCC)     Decreased calculated glomerular filtration rate (GFR) 3/30/2017    Calculated GFR equivalent to that of CKD stage 3 = 30-59 ml/min    Diabetic neuropathy associated with type 2 diabetes mellitus ([facility] [address] 6/28/2011    Difficult airway for intubation 08/22/2012    see anesthesia airway note    Dyslipidemia 6/28/2011    Gout     History of complete heart block 6/28/2011    History of Coumadin therapy     Anticoagulation for DVT of the LUE; Discontinued on 3/30/2017    History of deep venous thrombosis 9/5/2012    Left upper extremity    History of pyelonephritis 3/30/2017    Left bundle branch block (LBBB) on electrocardiogram 6/28/2011    Nonischemic cardiomyopathy ([facility] [address] 6/28/2011    Obesity (BMI 35.0-39.9 without comorbidity) ([facility] [address] 3/13/2017    Obstructive sleep apnea on CPAP 2/7/2012    Psoas abscess, right ([facility] [address] 4/20/2017    Psoas hematoma, right, secondary to anticoagulant therapy 3/30/2017    Type 2 diabetes mellitus with diabetic neuropathy ([facility] [address] 6/28/2011       Past Surgical History:   Procedure Laterality Date    HX CARPAL TUNNEL RELEASE  4/07    right     HX CHOLECYSTECTOMY  1994    HX HYSTERECTOMY  1973    HX OTHER SURGICAL  6/11/2012    AICD revision    HX PACEMAKER  4/28/2005    [employer] AICD       Family History   Problem Relation Age of Onset    Cancer Father      Leukemia       Social History     Social History    Marital status:      Spouse name: N/A    Number of children: N/A    Years of education: N/A     Social History Main Topics    Smoking status: Never Smoker    Smokeless tobacco: Never Used    Alcohol use No    Drug use: No    Sexual activity: Yes     Partners: Male     Other Topics Concern    Not on file     Social History Narrative       Prior to Admission medications    Medication Sig Start Date End Date Taking? Authorizing Provider   gabapentin (NEURONTIN) 400 mg capsule Take 1 Cap by mouth two (2) times a day. Indications: NEUROPATHIC PAIN 4/19/17   Shauna Wheeler MD   cholecalciferol (VITAMIN D3) 1,000 unit tablet Take 2 Tabs by mouth daily. Indications: PREVENTION OF VITAMIN D DEFICIENCY 4/19/17   Shauna Wheeler MD   SITagliptin (JANUVIA) 50 mg tablet Take 50 mg by mouth daily. Indications: type 2 diabetes mellitus    Historical Provider   potassium chloride (KLOR-CON M20) 20 mEq tablet Take 20 mEq by mouth two (2) times a day.  Indications: HYPOKALEMIA PREVENTION    Historical Provider ondansetron (ZOFRAN ODT) 4 mg disintegrating tablet Take 4 mg by mouth every eight (8) hours as needed for Nausea. Historical Provider   cyclobenzaprine (FLEXERIL) 10 mg tablet Take 10 mg by mouth as needed for Muscle Spasm(s). Indications: MUSCLE SPASM    Historical Provider   carvedilol (COREG) 6.25 mg tablet Take 1 Tab by mouth two (2) times daily (with meals). Indications: hypertension 4/18/17   Naomie Henley MD   acetaminophen (TYLENOL) 325 mg tablet Take 2 Tabs by mouth every four (4) hours as needed (for fever or pain level less than 5/10). Indications: Fever, Pain 4/18/17   Naomie Henley MD   allopurinol (ZYLOPRIM) 100 mg tablet Take 1 Tab by mouth daily. Indications: GOUT 4/18/17   Naomie Henley MD   insulin glargine (LANTUS) 100 unit/mL injection 15 Units by SubCUTAneous route nightly. Indications: type 2 diabetes mellitus 4/18/17   Naomie Henley MD   docusate sodium (COLACE) 100 mg capsule Take 1 Cap by mouth daily (after breakfast). Indications: Constipation 4/18/17   Naomie Henley MD   senna-docusate (PERICOLACE) 8.6-50 mg per tablet Take 2 Tabs by mouth daily (after dinner). Indications: Constipation 4/18/17   Naomie Henley MD   oxyCODONE-acetaminophen (PERCOCET 7.5) 7.5-325 mg per tablet Take 1 Tab by mouth every four (4) hours as needed (for pain level greater than 4/10). Max Daily Amount: 6 Tabs. Indications: Pain 4/18/17   Naomie Henley MD   bumetanide [facility]) 1 mg tablet TAKE 1 TABLET TWICE A DAY 4/14/17   Allan Delarosa DO   pravastatin (PRAVACHOL) 40 mg tablet Take 40 mg by mouth nightly. Indications: DYSLIPIDEMIA    Historical Provider   ferrous sulfate 325 mg (65 mg iron) tablet Take 1 Tab by mouth two (2) times daily (with meals).  9/11/12   Naomie Henley MD   insulin aspart (NOVOLOG) 100 unit/mL injection INITIATE INSULIN CORRECTIVE PROTOCOL (HR): Normal Insulin Sensitivity  For Blood Sugar (mg/dL) of:    Less than 150 =   0 units          150 -199 =   2 units 200 -249 = 4 units 250 -299 =   6 units 300 -349 =   8 units 350 and above =   10 units If 2 glucose readings are above 200 mg/dL 9/5/12   LUDMILA Osuna       Allergies   Allergen Reactions    Vancomycin Itching    Ampicillin Itching    Bactrim [Sulfamethoxazole-Trimethoprim] Unknown (comments)    Blueberry Swelling     Causes throat swelling    Ciprofloxacin Itching    Codeine Other (comments)     Jumpy feeling    Crestor [Rosuvastatin] Itching    Darvocet A500 [Propoxyphene N-Acetaminophen] Itching    Demerol [Meperidine] Itching    Levaquin [Levofloxacin] Itching    Lipitor [Atorvastatin] Myalgia    Magnesium Oxide Itching     nausea    Minocin [Minocycline] Unknown (comments)    Pcn [Penicillins] Itching    Pravachol [Pravastatin] Swelling     Swelling in mouth     Sulfa (Sulfonamide Antibiotics) Itching    Ultracet [Tramadol-Acetaminophen] Itching    Vicodin [Hydrocodone-Acetaminophen] Unknown (comments)    Vytorin 10-10 [Ezetimibe-Simvastatin] Myalgia    Percodan [Oxycodone Hcl-Oxycodone-Asa] Itching       Physical Exam:      Visit Vitals    /63 (BP 1 Location: Left arm, BP Patient Position: Supine)    Pulse 86    Temp 98.6 °F (37 °C)    Resp 18    Ht 5' 7\" (1.702 m)    Wt 258 lb 4.8 oz (117.2 kg)    SpO2 95%    Breastfeeding No    BMI 40.46 kg/m2       GENERAL: alert, cooperative, severe distress, appears stated age, THROAT & NECK: normal and no erythema or exudates noted. , LUNG: clear to auscultation bilaterally, HEART: regular rate and rhythm, S1, S2 normal, no murmur, click, rub or gallop, ABDOMEN: soft, non-tender. Bowel sounds normal. No masses,  no organomegaly    ROS:  Pertinent items are noted in HPI. Unless otherwise mentioned in the HPI.     Data Review:    CBC:   Lab Results   Component Value Date/Time    WBC 11.8 05/10/2017 11:35 PM    RBC 3.46 05/10/2017 11:35 PM    HGB 9.2 05/10/2017 11:35 PM    HCT 28.1 05/10/2017 11:35 PM    PLATELET 037 03/09/8686 11:35 PM BMP:   Lab Results   Component Value Date/Time    Glucose 130 05/10/2017 11:35 PM    Sodium 135 05/10/2017 11:35 PM    Potassium 3.9 05/10/2017 11:35 PM    Chloride 100 05/10/2017 11:35 PM    CO2 26 05/10/2017 11:35 PM    BUN 20 05/10/2017 11:35 PM    Creatinine 1.79 05/10/2017 11:35 PM    Calcium 8.2 05/10/2017 11:35 PM     Coagulation:   Lab Results   Component Value Date/Time    Prothrombin time 17.6 04/21/2017 04:52 AM    INR 1.5 04/21/2017 04:52 AM    aPTT 33.8 05/10/2017 11:35 PM         Assessment/Plan     78 y/o female with DVT and inability to get anticoagulation. --will place IVC filter. --filter can be removed once able to take anticoagulation  --Please call with further questions or concerns.     Principal Problem:    Acute paraplegia ([address] (4/20/2017)    Active Problems:    Type 2 diabetes mellitus with diabetic neuropathy ([address] (6/28/2011)      AICD generator infection ([address] (8/20/2012)      Overview: S/p explant 4 leads 8/20/12      Benign hypertensive heart disease with systolic CHF, NYHA class 2 ([address] (9/5/2012)      Decreased calculated glomerular filtration rate (GFR) (3/30/2017)      Overview: Calculated GFR equivalent to that of CKD stage 3 = 30-59 ml/min      Iliopsoas muscle hematoma (3/30/2017)      Psoas hematoma, right, secondary to anticoagulant therapy (3/30/2017)      Impaired mobility and ADLs (3/30/2017)      History of Coumadin therapy ()      Overview: Anticoagulation for chronic atrial fibrillation; Discontinued on 3/30/2017      Sepsis ([address] (4/20/2017)      Psoas abscess, right ([address] (4/20/2017)      Krueger catheter in place on admission (4/20/2017)      Urinary tract infection due to Enterococcus (4/20/2017)      Group B streptococcal infection (4/20/2017)        Jefferson Burrows MD  May 11, 2017

## 2017-05-11 NOTE — PROGRESS NOTES
Dr Taj Hastings notified temp has gone up to 102. 7 two hours after tylenol given. Order received for tylenol 650mg q4 prn.

## 2017-05-11 NOTE — PROGRESS NOTES
RENAL DAILY PROGRESS NOTE    Subjective:     Admitted for abd pain seen for LISBETH    Complaint: post new drain yesterday feels a little less cold today, no CP/SOB.  Appetite fair, no N/V    Current Facility-Administered Medications   Medication Dose Route Frequency    albuterol-ipratropium (DUO-NEB) 2.5 MG-0.5 MG/3 ML  3 mL Nebulization Q4HWA RT    mirtazapine (REMERON SOL-TAB) disintegrating tablet 15 mg  15 mg Oral QHS    magnesium sulfate 1 g/100 ml IVPB (premix or compounded)  1 g IntraVENous ONCE    sodium chloride (NS) flush 5-10 mL  5-10 mL IntraVENous Q8H    naloxone (NARCAN) injection 0.1 mg  0.1 mg IntraVENous Multiple    acetaminophen (TYLENOL) tablet 650 mg  650 mg Oral Q4H PRN    0.9% sodium chloride infusion  50 mL/hr IntraVENous CONTINUOUS    famotidine (PEPCID) tablet 20 mg  20 mg Oral DAILY    insulin lispro (HUMALOG) injection   SubCUTAneous AC&HS    0.9% sodium chloride infusion 250 mL  250 mL IntraVENous PRN    cefTRIAXone (ROCEPHIN) 2 g in 0.9% sodium chloride (MBP/ADV) 50 mL MBP  2 g IntraVENous Q24H    insulin glargine (LANTUS) injection 20 Units  20 Units SubCUTAneous DAILY    diphenhydrAMINE (BENADRYL) capsule 50 mg  50 mg Oral Q4H PRN    polyethylene glycol (MIRALAX) packet 17 g  17 g Oral TID    cholecalciferol (VITAMIN D3) tablet 2,000 Units  2,000 Units Oral DAILY    docusate sodium (COLACE) capsule 100 mg  100 mg Oral DAILY AFTER BREAKFAST    gabapentin (NEURONTIN) capsule 400 mg  400 mg Oral BID    oxyCODONE-acetaminophen (PERCOCET 7.5) 7.5-325 mg per tablet 1 Tab  1 Tab Oral Q4H PRN    pravastatin (PRAVACHOL) tablet 40 mg  40 mg Oral QHS    senna-docusate (PERICOLACE) 8.6-50 mg per tablet 2 Tab  2 Tab Oral PCD    acetaminophen (TYLENOL) tablet 500 mg  500 mg Oral Q6H PRN    allopurinol (ZYLOPRIM) tablet 100 mg  100 mg Oral DAILY    folic acid (FOLVITE) tablet 1 mg  1 mg Oral DAILY    ondansetron (ZOFRAN) injection 4 mg  4 mg IntraVENous Q8H PRN    glucose chewable tablet 16 g  16 g Oral PRN    glucagon (GLUCAGEN) injection 1 mg  1 mg IntraMUSCular PRN    dextrose (D50W) injection syrg 12.5-25 g  25-50 mL IntraVENous PRN           Objective:     Patient Vitals for the past 24 hrs:   Temp Pulse Resp BP SpO2   05/11/17 1149 98.6 °F (37 °C) 86 18 108/63 97 %   05/11/17 0851 - - - - 94 %   05/11/17 0800 98.6 °F (37 °C) 78 18 105/61 97 %   05/11/17 0400 98.1 °F (36.7 °C) 74 18 95/62 96 %   05/11/17 0000 97.6 °F (36.4 °C) 84 18 131/67 97 %   05/10/17 2320 99.4 °F (37.4 °C) - - - -   05/10/17 2220 (!) 102.7 °F (39.3 °C) - - - -   05/10/17 2000 (!) 102 °F (38.9 °C) 74 18 134/74 98 %   05/10/17 1752 - - - - 96 %   05/10/17 1749 99.1 °F (37.3 °C) 93 12 123/71 (!) 87 %   05/10/17 1600 99.3 °F (37.4 °C) 92 20 152/78 90 %   05/10/17 1320 - 82 20 135/63 98 %   05/10/17 1315 - 83 22 120/69 96 %   05/10/17 1310 - 83 18 124/60 96 %   05/10/17 1305 - 81 22 117/62 95 %   05/10/17 1300 - 82 21 117/62 95 %   05/10/17 1255 - 80 17 134/63 95 %   05/10/17 1250 - 83 14 138/58 95 %   05/10/17 1245 - 81 18 117/63 94 %   05/10/17 1240 - 80 19 139/66 93 %   05/10/17 1235 - 80 22 138/64 96 %   05/10/17 1232 - 84 22 139/66 94 %        Weight change: 0.527 kg (1 lb 2.6 oz)     05/09 1901 - 05/11 0700  In: 1755.8 [I.V.:1755.8]  Out: 2949 [Urine:4150]    Intake/Output Summary (Last 24 hours) at 05/11/17 1158  Last data filed at 05/11/17 0814   Gross per 24 hour   Intake             1433 ml   Output             1535 ml   Net             -102 ml     Physical Exam: alert, oriented x 3, afebrile    HEENT: non icteric  Neck: no JVD  Cardiovascular: regular, no rub  C/L: clear, no rales  Abdomen: soft, +bs, + drain  Ext: no edema    Data Review:     LABS:   Hematology:   Recent Labs      05/10/17   2335  05/10/17   0327  05/09/17   0404  05/08/17   1342   WBC  11.8   --   6.9  7.4   HGB  9.2*  8.9*  8.9*  9.2*   HCT  28.1*  27.1*  27.1*  28.8*   Pl 164K  Chemistry:   Recent Labs      05/10/17   2612 05/10/17   0327  05/09/17   0404   BUN  20*  20*  22*   CREA  1.79*  1.77*  2.07*   CA  8.2*  8.2*  8.1*   ALB   --    --   1.5*   K  3.9  3.6  4.2   NA  135*  136  138   CL  100  101  103   CO2  26  27  30   PHOS   --    --   3.8   GLU  130*  92  78       IMPRESSION AND PLAN:   LISBETH from Urinary retention improving slowly. No labs today. Good urine output. Cont to avoid nephrotoxic drugs and adjust meds to renal function. Krueger to drain.   Anemia Hgb stable         Josep Campos MD  5/11/2017

## 2017-05-11 NOTE — ROUTINE PROCESS
Dr. Viola Jones made aware at nurses' station of positive DVT results from vascular study in [facility]. Dr. Viola Jones to put in orders.  Gloria Willett

## 2017-05-11 NOTE — PROGRESS NOTES
NUTRITION    Screen     RECOMMENDATIONS / PLAN:     - Update food preferences and supplement flavor preference  - Family can provide food if patient prefers, keep consistent with diet order  - Continue RD inpatient monitoring and evaluation. NUTRITION INTERVENTIONS & DIAGNOSIS:     [x] Meals/Snacks: modified diet   [x] Medical food supplementation:  Glucerna Shake TID  [x] Vitamin/mineral supplementation: magnesium sulfate, vitamin D3  [] Nutrition related medication management: NS at 50 mL/hr, remeron (to start today)    Nutrition Diagnosis: Inadequate energy intake related to decreased appetite as evidenced by decreased meal intake PTA and since admission    ASSESSMENT:     Subjective/Objective:  Patient reported appetite and meal intake are fair. Discussed food preferences. Consuming supplements; discussed flavor preference. Discussed that pt's family can provide food as well, stay consistent with diet order. Encouraged po intake meals and supplements. Noted pt to be started on remeron today    Average po intake adequate to meet patients estimated nutritional needs:   [] Yes     [x] No   [] Unable to determine at this time    Diet: DIET NUTRITIONAL SUPPLEMENTS All Meals; [address] CARB Regular      Food Allergies: blueberries  Current Appetite:   [] Good     [x] Fair     [] Poor     [] Other:    Appetite/meal intake prior to admission:   [] Good     [] Fair     [x] Poor (since admission to [facility])    [] Other:  Feeding Limitations:  [] Swallowing difficulty    [] Chewing difficulty    [] Other:  Current Meal Intake:   No data found.     BM: 5/10  Skin Integrity:  Callus on right foot   Edema:  2+ LEs  Pertinent Medications: Reviewed: bowel regimen    Recent Labs      05/10/17   2335  05/10/17   0327  05/09/17   0404   NA  135*  136  138   K  3.9  3.6  4.2   CL  100  101  103   CO2  26  27  30   GLU  130*  92  78   BUN  20*  20*  22*   CREA  1.79*  1.77*  2.07*   CA  8.2*  8.2* 8.1*   MG  1.7   --   1.9   PHOS   --    --   3.8   ALB   --    --   1.5*       Intake/Output Summary (Last 24 hours) at 05/11/17 1223  Last data filed at 05/11/17 0814   Gross per 24 hour   Intake             1433 ml   Output             1535 ml   Net             -102 ml       Anthropometrics:  Ht Readings from Last 1 Encounters:   05/11/17 5' 7\" (1.702 m)     Last 3 Recorded Weights in this Encounter    05/09/17 0556 05/10/17 0629 05/11/17 0607   Weight: 116.9 kg (257 lb 12.8 oz) 116.6 kg (257 lb 2.2 oz) 117.2 kg (258 lb 4.8 oz)     Body mass index is 40.46 kg/(m^2). Weight History:  Per chart review, patient reported usual body weight is 240 lbs. Weight Metrics 5/11/2017 4/20/2017 4/6/2017 4/6/2017 3/30/2017 3/16/2017 3/14/2017   Weight 258 lb 4.8 oz - 233 lb 227 lb 1.6 oz - 240 lb 243 lb   BMI - 40.46 kg/m2 36.49 kg/m2 - 35.57 kg/m2 37.59 kg/m2 38.06 kg/m2        Admitting Diagnosis: Acute paraplegia  Acute paraplegia [facility])  Past Medical History:   Diagnosis Date    Acute paraplegia ([facility] [address] 4/20/2017    Benign hypertensive heart disease with systolic CHF, NYHA class 2 ([facility] [address] 9/5/2012    Biventricular implantable cardioverter-defibrillator in situ 04/28/2005    Upgraded to BiV AICD; gen change 4/2008; pocket revision 10/2009; Abdominal - done on 8/22/2012 by Dr. Marquez Tillman Cardiac cath 08/15/1996    Patent coronaries. Elev LVEDP. EF 50-55%.  Cardiac echocardiogram 06/23/2015    Ltd study. EF 45-50%. Mild, diffuse hypk. Severe apical hypk. No mass or thrombus was clearly identified, although imaging was suboptimal.      Cardiac nuclear imaging test 06/19/2015    Fixed distal apical, distal septal defect more likely due to RV pacing than prior infarct. No ischemia. EF 46%. RWMA c/w RV pacing. Nondiagnostic EKG on pharm stress test.      Cardiovascular lower extremity venous duplex 09/04/2012    Acute, non-occlusive DVT in CFV on right. No DVT on left.   No superficial thrombosis bilaterally.  Cardiovascular upper extremity venous duplex 08/27/2012    DVT in axillary vein on left. Left subclavian was not visualized.  Chronic anemia 9/5/2012    Chronic systolic heart failure (HCC)     Decreased calculated glomerular filtration rate (GFR) 3/30/2017    Calculated GFR equivalent to that of CKD stage 3 = 30-59 ml/min    Diabetic neuropathy associated with type 2 diabetes mellitus ([facility] [address] 6/28/2011    Difficult airway for intubation 08/22/2012    see anesthesia airway note    Dyslipidemia 6/28/2011    Gout     History of complete heart block 6/28/2011    History of Coumadin therapy     Anticoagulation for DVT of the LUE; Discontinued on 3/30/2017    History of deep venous thrombosis 9/5/2012    Left upper extremity    History of pyelonephritis 3/30/2017    Left bundle branch block (LBBB) on electrocardiogram 6/28/2011    Nonischemic cardiomyopathy ([address] 6/28/2011    Obesity (BMI 35.0-39.9 without comorbidity) ([facility] [address] 3/13/2017    Obstructive sleep apnea on CPAP 2/7/2012    Psoas abscess, right ([address] 4/20/2017    Psoas hematoma, right, secondary to anticoagulant therapy 3/30/2017    Type 2 diabetes mellitus with diabetic neuropathy ([facility] [address] 6/28/2011       Education Needs:        [x] None identified  [] Identified - Not appropriate at this time  []  Identified and addressed - refer to education log  Learning Limitations:   [x] None identified  [] Identified    Cultural, Orthodox & ethnic food preferences:  [x] None identified    [] Identified and addressed     ESTIMATED NUTRITION NEEDS:     Calories: 8081-3455 kcal (MSJx1.2-1.3) based on  [x] Actual BW: 111 kg      [] IBW   CHO: 252-273 gm (50% kcal)   Protein:  gm (0.8-1 gm/kg) based on  [x] Actual BW      [] IBW   Fluid: 1 mL/kcal     MONITORING & EVALUATION:     Nutrition Goal(s):   1. Po intake of meals will meet >75% of patient estimated nutritional needs within the next 7 days.   Outcome:  [] Met/Ongoing [x]  Not Met: progressing    [] New/Initial Goal     Monitoring:   [x] Diet tolerance   [x] Meal intake   [x] Supplement intake   [] GI symptoms/ability to tolerate po diet   [] Respiratory status   [] Plan of care      Previous Recommendations (for follow-up assessments only):     [x]   Implemented       []   Not Implemented (RD to address)     [] No Recommendation Made     Discharge Planning: diabetic, cardiac diet  [x] Participated in care planning, discharge planning, & interdisciplinary rounds as appropriate      Mercedes Villanueva, [address] [place] [address]   Pager: [phone]

## 2017-05-11 NOTE — ROUTINE PROCESS
Bilateral lower extremity venous duplex exam completed. Positive results called to DANIEL Chilel on [address].

## 2017-05-11 NOTE — PROGRESS NOTES
SUBJECTIVE:    Feeling better. SOB improving. No nausea or vomiting. No headaches or dizziness. No Abdominal pain or N/V currently. OBJECTIVE:    Visit Vitals    /63 (BP 1 Location: Left arm, BP Patient Position: Supine)    Pulse 86    Temp 98.6 °F (37 °C)    Resp 18    Ht 5' 7\" (1.702 m)    Wt 117.2 kg (258 lb 4.8 oz)    SpO2 97%    Breastfeeding No    BMI 40.46 kg/m2     RS: diminished bilaterally anteriorly but good air entry posteriorly   Neck: trachea is midline  Chest: no palpable chest tenderness. CVS: RRR  GI: ND, BS +, drain +. NT   Extremities: trace pedal edema  CNS: motor strength 0-1/5 in both LEs. 5/5 un UEs  General: NAD, AWAKE, Follows commands    ASSESSMENT:    1. Sepsis secondary to infected right psoas hematoma/epidural abscess with neurological compromise resulting in paraplegia. S/p CT guided right new psoas muscle drainage catheter placement and old drain removal.  2. 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 3. h/o pain at the site of abdominal pacemaker  4. Heart block s/p AICD 2005 with upgrade to BiV later that year, removed however in 2012 due to trauma and infection. New biventricular pacemaker LUQ 9/2012 by Dr. 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 5. Elevated troponin likely demand ischemia in setting of sepsis. 6. H/p transient nonischemic CMY with EF 45%  7. Acute kidney injury due to neurogenic bladder and Component of ATN, improving  8. Acute on chronic anemia s/p 2 units  9. Diabetes mellitus. 10.Dyslipidemia. 6. H/o HTN. 12. LBBB. 13. HALI on CPAP. 15. H/o DVT. 15. Atypical Chest pain   16.  Hypoxia due to atelectasis    PLAN:    Cont antibiotic per ID  Cont current management including perez catheter  Sona x 2 and EKG report reviewed  VQscan is negative  Check doppler  Never received heparin yesterday  Add IS    --> TOTAL TIME GREATER THAN 35 MINUTES    CMP:   Lab Results   Component Value Date/Time     (L) 05/10/2017 11:35 PM    K 3.9 05/10/2017 11:35 PM     05/10/2017 11:35 PM    CO2 26 05/10/2017 11:35 PM    AGAP 9 05/10/2017 11:35 PM     (H) 05/10/2017 11:35 PM    BUN 20 (H) 05/10/2017 11:35 PM    CREA 1.79 (H) 05/10/2017 11:35 PM    GFRAA 34 (L) 05/10/2017 11:35 PM    GFRNA 28 (L) 05/10/2017 11:35 PM    CA 8.2 (L) 05/10/2017 11:35 PM    MG 1.7 05/10/2017 11:35 PM        CBC:   Lab Results   Component Value Date/Time    WBC 11.8 05/10/2017 11:35 PM    HGB 9.2 (L) 05/10/2017 11:35 PM    HCT 28.1 (L) 05/10/2017 11:35 PM     05/10/2017 11:35 PM

## 2017-05-11 NOTE — PROGRESS NOTES
Problem: Mobility Impaired (Adult and Pediatric)  Goal: *Acute Goals and Plan of Care (Insert Text)  STGs to be addressed within 3 days:  1. Bed mobility: Supine to sit to supine MaxAx1 with HR for meals. 2. Activity tolerance: Tolerate EOB > 15 minutes for ADLs. 3. Transfers: SPT-->to chair max/mod A with LRAD for ADLs. 4. Pt demo fair seated balance in preparation for functional transfers. LTGs to be addressed within 7 days:  1. Transfers: SB-->to chair/wc max/mod A for ADLs. 2. Activity tolerance: Tolerated > 1 hr in chair for change of position. 3. Patient Education: Independent with HEP for home safety. Time: 0                 Pt with newly discovered DVT R popliteal and L common femoral pt has just returned from vascular lab and begun anticoagulation therapy. Will hold treatment pending 24 hrs of heparin treatment. Treatment to resume tomorrow after 1400. Pt's nurse reports that pt has plans to go for IVC placement ASAP. Will continue to follow for developments. Addendum:  IVC is appropriate treatment as physician explains anticoagulation is contraindicated 2/2 hematoma. Will resume treatment post placement of IVC filter per request of attending.          Daria Torrez PTA  5/11/2017

## 2017-05-11 NOTE — CONSULTS
Surgery Consult      Patient: Nu Bueno MRN: [id number]  CSN: [id number]      YOB: 1950    Age: 77 y.o. Sex: female      DOA: 4/20/2017       HPI:     Nu Bueno is a 77 y.o. female who presents with psoas abscess with DVT and paralysis. Needs IVC filter. Past Medical History:   Diagnosis Date    Acute paraplegia ([facility] 4/20/2017    Benign hypertensive heart disease with systolic CHF, NYHA class 2 ([facility] 9/5/2012    Biventricular implantable cardioverter-defibrillator in situ 04/28/2005    Upgraded to BiV AICD; gen change 4/2008; pocket revision 10/2009; Abdominal - done on 8/22/2012 by Dr. Liu Other Cardiac cath 08/15/1996    Patent coronaries. Elev LVEDP. EF 50-55%.  Cardiac echocardiogram 06/23/2015    Ltd study. EF 45-50%. Mild, diffuse hypk. Severe apical hypk. No mass or thrombus was clearly identified, although imaging was suboptimal.      Cardiac nuclear imaging test 06/19/2015    Fixed distal apical, distal septal defect more likely due to RV pacing than prior infarct. No ischemia. EF 46%. RWMA c/w RV pacing. Nondiagnostic EKG on pharm stress test.      Cardiovascular lower extremity venous duplex 09/04/2012    Acute, non-occlusive DVT in CFV on right. No DVT on left. No superficial thrombosis bilaterally.  Cardiovascular upper extremity venous duplex 08/27/2012    DVT in axillary vein on left. Left subclavian was not visualized.     Chronic anemia 9/5/2012    Chronic systolic heart failure (HCC)     Decreased calculated glomerular filtration rate (GFR) 3/30/2017    Calculated GFR equivalent to that of CKD stage 3 = 30-59 ml/min    Diabetic neuropathy associated with type 2 diabetes mellitus ([facility] [address] 6/28/2011    Difficult airway for intubation 08/22/2012    see anesthesia airway note    Dyslipidemia 6/28/2011    Gout     History of complete heart block 6/28/2011    History of Coumadin therapy     Anticoagulation for DVT of the LUE; Discontinued on 3/30/2017    History of deep venous thrombosis 9/5/2012    Left upper extremity    History of pyelonephritis 3/30/2017    Left bundle branch block (LBBB) on electrocardiogram 6/28/2011    Nonischemic cardiomyopathy ([facility] [address] 6/28/2011    Obesity (BMI 35.0-39.9 without comorbidity) ([facility] [address] 3/13/2017    Obstructive sleep apnea on CPAP 2/7/2012    Psoas abscess, right ([facility] [address] 4/20/2017    Psoas hematoma, right, secondary to anticoagulant therapy 3/30/2017    Type 2 diabetes mellitus with diabetic neuropathy ([facility] [address] 6/28/2011       Past Surgical History:   Procedure Laterality Date    HX CARPAL TUNNEL RELEASE  4/07    right     HX CHOLECYSTECTOMY  1994    HX HYSTERECTOMY  1973    HX OTHER SURGICAL  6/11/2012    AICD revision    HX PACEMAKER  4/28/2005    [employer] AICD       Family History   Problem Relation Age of Onset    Cancer Father      Leukemia       Social History     Social History    Marital status:      Spouse name: N/A    Number of children: N/A    Years of education: N/A     Social History Main Topics    Smoking status: Never Smoker    Smokeless tobacco: Never Used    Alcohol use No    Drug use: No    Sexual activity: Yes     Partners: Male     Other Topics Concern    Not on file     Social History Narrative       Prior to Admission medications    Medication Sig Start Date End Date Taking? Authorizing Provider   gabapentin (NEURONTIN) 400 mg capsule Take 1 Cap by mouth two (2) times a day. Indications: NEUROPATHIC PAIN 4/19/17   Jame Burkitt, MD   cholecalciferol (VITAMIN D3) 1,000 unit tablet Take 2 Tabs by mouth daily. Indications: PREVENTION OF VITAMIN D DEFICIENCY 4/19/17   Jame Burkitt, MD   SITagliptin (JANUVIA) 50 mg tablet Take 50 mg by mouth daily. Indications: type 2 diabetes mellitus    Historical Provider   potassium chloride (KLOR-CON M20) 20 mEq tablet Take 20 mEq by mouth two (2) times a day.  Indications: HYPOKALEMIA PREVENTION    Historical Provider ondansetron (ZOFRAN ODT) 4 mg disintegrating tablet Take 4 mg by mouth every eight (8) hours as needed for Nausea. Historical Provider   cyclobenzaprine (FLEXERIL) 10 mg tablet Take 10 mg by mouth as needed for Muscle Spasm(s). Indications: MUSCLE SPASM    Historical Provider   carvedilol (COREG) 6.25 mg tablet Take 1 Tab by mouth two (2) times daily (with meals). Indications: hypertension 4/18/17   Merline Fernandez MD   acetaminophen (TYLENOL) 325 mg tablet Take 2 Tabs by mouth every four (4) hours as needed (for fever or pain level less than 5/10). Indications: Fever, Pain 4/18/17   Merline Fernandez MD   allopurinol (ZYLOPRIM) 100 mg tablet Take 1 Tab by mouth daily. Indications: GOUT 4/18/17   Merline Fernandez MD   insulin glargine (LANTUS) 100 unit/mL injection 15 Units by SubCUTAneous route nightly. Indications: type 2 diabetes mellitus 4/18/17   Merline Fernandez MD   docusate sodium (COLACE) 100 mg capsule Take 1 Cap by mouth daily (after breakfast). Indications: Constipation 4/18/17   Merline Fernandez MD   senna-docusate (PERICOLACE) 8.6-50 mg per tablet Take 2 Tabs by mouth daily (after dinner). Indications: Constipation 4/18/17   Merline Fernandez MD   oxyCODONE-acetaminophen (PERCOCET 7.5) 7.5-325 mg per tablet Take 1 Tab by mouth every four (4) hours as needed (for pain level greater than 4/10). Max Daily Amount: 6 Tabs. Indications: Pain 4/18/17   Merline Fernandez MD   bumetanide [facility]) 1 mg tablet TAKE 1 TABLET TWICE A DAY 4/14/17   Lux Quinn DO   pravastatin (PRAVACHOL) 40 mg tablet Take 40 mg by mouth nightly. Indications: DYSLIPIDEMIA    Historical Provider   ferrous sulfate 325 mg (65 mg iron) tablet Take 1 Tab by mouth two (2) times daily (with meals).  9/11/12   Merline Fernandez MD   insulin aspart (NOVOLOG) 100 unit/mL injection INITIATE INSULIN CORRECTIVE PROTOCOL (HR): Normal Insulin Sensitivity  For Blood Sugar (mg/dL) of:    Less than 150 =   0 units          150 -199 =   2 units 200 -249 = 4 units 250 -299 =   6 units 300 -349 =   8 units 350 and above =   10 units If 2 glucose readings are above 200 mg/dL 9/5/12   LUDMILA Osuna       Allergies   Allergen Reactions    Vancomycin Itching    Ampicillin Itching    Bactrim [Sulfamethoxazole-Trimethoprim] Unknown (comments)    Blueberry Swelling     Causes throat swelling    Ciprofloxacin Itching    Codeine Other (comments)     Jumpy feeling    Crestor [Rosuvastatin] Itching    Darvocet A500 [Propoxyphene N-Acetaminophen] Itching    Demerol [Meperidine] Itching    Levaquin [Levofloxacin] Itching    Lipitor [Atorvastatin] Myalgia    Magnesium Oxide Itching     nausea    Minocin [Minocycline] Unknown (comments)    Pcn [Penicillins] Itching    Pravachol [Pravastatin] Swelling     Swelling in mouth     Sulfa (Sulfonamide Antibiotics) Itching    Ultracet [Tramadol-Acetaminophen] Itching    Vicodin [Hydrocodone-Acetaminophen] Unknown (comments)    Vytorin 10-10 [Ezetimibe-Simvastatin] Myalgia    Percodan [Oxycodone Hcl-Oxycodone-Asa] Itching       Physical Exam:      Visit Vitals    /63 (BP 1 Location: Left arm, BP Patient Position: Supine)    Pulse 86    Temp 98.6 °F (37 °C)    Resp 18    Ht 5' 7\" (1.702 m)    Wt 258 lb 4.8 oz (117.2 kg)    SpO2 95%    Breastfeeding No    BMI 40.46 kg/m2       GENERAL: alert, cooperative, severe distress, appears stated age, THROAT & NECK: normal and no erythema or exudates noted. , LUNG: clear to auscultation bilaterally, HEART: regular rate and rhythm, S1, S2 normal, no murmur, click, rub or gallop, ABDOMEN: soft, non-tender. Bowel sounds normal. No masses,  no organomegaly    ROS:  Pertinent items are noted in HPI. Unless otherwise mentioned in the HPI.     Data Review:    CBC:   Lab Results   Component Value Date/Time    WBC 11.8 05/10/2017 11:35 PM    RBC 3.46 05/10/2017 11:35 PM    HGB 9.2 05/10/2017 11:35 PM    HCT 28.1 05/10/2017 11:35 PM    PLATELET 505 17/17/9600 11:35 PM BMP:   Lab Results   Component Value Date/Time    Glucose 130 05/10/2017 11:35 PM    Sodium 135 05/10/2017 11:35 PM    Potassium 3.9 05/10/2017 11:35 PM    Chloride 100 05/10/2017 11:35 PM    CO2 26 05/10/2017 11:35 PM    BUN 20 05/10/2017 11:35 PM    Creatinine 1.79 05/10/2017 11:35 PM    Calcium 8.2 05/10/2017 11:35 PM     Coagulation:   Lab Results   Component Value Date/Time    Prothrombin time 17.6 04/21/2017 04:52 AM    INR 1.5 04/21/2017 04:52 AM    aPTT 33.8 05/10/2017 11:35 PM         Assessment/Plan     78 y/o female with DVT and inability to get anticoagulation. --will place IVC filter. --filter can be removed once able to take anticoagulation  --Please call with further questions or concerns.     Principal Problem:    Acute paraplegia ([address] (4/20/2017)    Active Problems:    Type 2 diabetes mellitus with diabetic neuropathy ([address] (6/28/2011)      AICD generator infection ([address] (8/20/2012)      Overview: S/p explant 4 leads 8/20/12      Benign hypertensive heart disease with systolic CHF, NYHA class 2 ([address] (9/5/2012)      Decreased calculated glomerular filtration rate (GFR) (3/30/2017)      Overview: Calculated GFR equivalent to that of CKD stage 3 = 30-59 ml/min      Iliopsoas muscle hematoma (3/30/2017)      Psoas hematoma, right, secondary to anticoagulant therapy (3/30/2017)      Impaired mobility and ADLs (3/30/2017)      History of Coumadin therapy ()      Overview: Anticoagulation for chronic atrial fibrillation; Discontinued on 3/30/2017      Sepsis ([address] (4/20/2017)      Psoas abscess, right ([address] (4/20/2017)      Krueger catheter in place on admission (4/20/2017)      Urinary tract infection due to Enterococcus (4/20/2017)      Group B streptococcal infection (4/20/2017)        Tiesha Paige MD  May 11, 2017

## 2017-05-11 NOTE — PROGRESS NOTES
Progress Note    Patient: Pablito Tyler MRN: [id number]  SSN: [id number]    YOB: 1950  Age: 77 y.o.   Sex: female      Admit Date: 4/20/2017    LOS: 21 days     Subjective:     NEW  DRAIN PLACED PERC YESTERDAY  MINIMAL  OUTPUT    TEMP DOWN  NO NEW SX    Objective:     Vitals:    05/11/17 0000 05/11/17 0400 05/11/17 0607 05/11/17 0800   BP: 131/67 95/62  105/61   Pulse: 84 74  78   Resp: 18 18  18   Temp: 97.6 °F (36.4 °C) 98.1 °F (36.7 °C)  98.6 °F (37 °C)   TempSrc:       SpO2: 97% 96%  97%   Weight:   258 lb 4.8 oz (117.2 kg)    Height:   5' 7\" (1.702 m)         Intake and Output:  Current Shift: 05/11 0701 - 05/11 1900  In: 283 [I.V.:283]  Out: 335 [Urine:300; Drains:35]  Last three shifts: 05/09 1901 - 05/11 0700  In: 1755.8 [I.V.:1755.8]  Out: 4150 [Urine:4150]    Current Facility-Administered Medications   Medication Dose Route Frequency    heparin (porcine) 1,000 unit/mL injection 9,330 Units  80 Units/kg IntraVENous ONCE    albuterol-ipratropium (DUO-NEB) 2.5 MG-0.5 MG/3 ML  3 mL Nebulization Q4HWA RT    sodium chloride (NS) flush 5-10 mL  5-10 mL IntraVENous Q8H    sodium chloride (NS) flush 5-10 mL  5-10 mL IntraVENous PRN    flumazenil (ROMAZICON) 0.1 mg/mL injection 0.2 mg  0.2 mg IntraVENous Multiple    naloxone (NARCAN) injection 0.1 mg  0.1 mg IntraVENous Multiple    heparin 25,000 units in D5W 250 ml infusion  18-36 Units/kg/hr IntraVENous TITRATE    acetaminophen (TYLENOL) tablet 650 mg  650 mg Oral Q4H PRN    0.9% sodium chloride infusion  50 mL/hr IntraVENous CONTINUOUS    famotidine (PEPCID) tablet 20 mg  20 mg Oral DAILY    insulin lispro (HUMALOG) injection   SubCUTAneous AC&HS    0.9% sodium chloride infusion 250 mL  250 mL IntraVENous PRN    cefTRIAXone (ROCEPHIN) 2 g in 0.9% sodium chloride (MBP/ADV) 50 mL MBP  2 g IntraVENous Q24H    insulin glargine (LANTUS) injection 20 Units  20 Units SubCUTAneous DAILY    diphenhydrAMINE (BENADRYL) capsule 50 mg  50 mg Oral Q4H PRN    polyethylene glycol (MIRALAX) packet 17 g  17 g Oral TID    cholecalciferol (VITAMIN D3) tablet 2,000 Units  2,000 Units Oral DAILY    docusate sodium (COLACE) capsule 100 mg  100 mg Oral DAILY AFTER BREAKFAST    gabapentin (NEURONTIN) capsule 400 mg  400 mg Oral BID    oxyCODONE-acetaminophen (PERCOCET 7.5) 7.5-325 mg per tablet 1 Tab  1 Tab Oral Q4H PRN    pravastatin (PRAVACHOL) tablet 40 mg  40 mg Oral QHS    senna-docusate (PERICOLACE) 8.6-50 mg per tablet 2 Tab  2 Tab Oral PCD    acetaminophen (TYLENOL) tablet 500 mg  500 mg Oral Q6H PRN    allopurinol (ZYLOPRIM) tablet 100 mg  100 mg Oral DAILY    clotrimazole (MYCELEX) frances 10 mg  10 mg Oral 5XD    folic acid (FOLVITE) tablet 1 mg  1 mg Oral DAILY    ondansetron (ZOFRAN) injection 4 mg  4 mg IntraVENous Q8H PRN    glucose chewable tablet 16 g  16 g Oral PRN    glucagon (GLUCAGEN) injection 1 mg  1 mg IntraMUSCular PRN    dextrose (D50W) injection syrg 12.5-25 g  25-50 mL IntraVENous PRN         Physical Exam:   GENERAL: alert, cooperative, no distress, appears stated age  ABDOMEN: soft, non-tender.  Bowel sounds normal. No masses,  no organomegaly  FLANK:  NON TENDER      Lab/Data Review:  BMP:   Lab Results   Component Value Date/Time     (L) 05/10/2017 11:35 PM    K 3.9 05/10/2017 11:35 PM     05/10/2017 11:35 PM    CO2 26 05/10/2017 11:35 PM    AGAP 9 05/10/2017 11:35 PM     (H) 05/10/2017 11:35 PM    BUN 20 (H) 05/10/2017 11:35 PM    CREA 1.79 (H) 05/10/2017 11:35 PM    GFRAA 34 (L) 05/10/2017 11:35 PM    GFRNA 28 (L) 05/10/2017 11:35 PM     CMP:   Lab Results   Component Value Date/Time     (L) 05/10/2017 11:35 PM    K 3.9 05/10/2017 11:35 PM     05/10/2017 11:35 PM    CO2 26 05/10/2017 11:35 PM    AGAP 9 05/10/2017 11:35 PM     (H) 05/10/2017 11:35 PM    BUN 20 (H) 05/10/2017 11:35 PM    CREA 1.79 (H) 05/10/2017 11:35 PM    GFRAA 34 (L) 05/10/2017 11:35 PM    GFRNA 28 (L) 05/10/2017 11:35 PM    CA 8.2 (L) 05/10/2017 11:35 PM    MG 1.7 05/10/2017 11:35 PM     CBC:   Lab Results   Component Value Date/Time    WBC 11.8 05/10/2017 11:35 PM    HGB 9.2 (L) 05/10/2017 11:35 PM    HCT 28.1 (L) 05/10/2017 11:35 PM     05/10/2017 11:35 PM          CT Results:    Results from Hospital Encounter encounter on 04/20/17   CT DRAIN ABS W CATH PERC   Narrative PREOPERATIVE DIAGNOSIS: Right psoas muscle abscess. POSTOPERATIVE DIAGNOSIS: Same    INDICATION: Residual right psoas muscle fluid collection. ATTENDING: Dr. Anyi Shukla M.D.    Matheus Reese: None. PROCEDURES:  1. New CT-guided drainage of a small right psoas muscle residual fluid  collection and drainage catheter placement. 2. Old right psoas muscle drainage catheter removal.    ANESTHESIA: Local 1% lidocaine as well as moderate intravenous sedation with  Versed and fentanyl given and monitored per independently trained interventional  radiology nurse under my direct supervision for 30 minutes. Please see detailed  nursing records for medication dosing. CONTRAST: None. COMPLICATIONS: None    DRAIN: No    CATHETER: None. EBL: None. SPECIMEN: None. TECHNIQUE:     All CT scans at this facility are performed using dose optimization technique as  appropriate to a performed exam, to include automated exposure control,  adjustment of the mA and/or kV according to patient size (including appropriate  matching for site-specific examinations), or use of iterative reconstruction  technique. The risks, benefits, and alternatives were discussed. Written and verbal consent  obtained. Patient was placed supine on CT table and the right lower quadrant  region was prepped and draped in usual sterile fashion. Maximum sterile barrier  technique was used. Timeout was performed. 1% lidocaine was then used. Preliminary CT imaging of the lower thoracic and lumbar spine was obtained.     Under direct CT fluoroscopy guidance using 18-gauge trocar needle was advanced  down into collection. J-wire was advanced through the needle. Serial dilatations  were performed. 8.5 [employer] all-purpose drainage catheter was placed into  collection under direct CT fluoroscopy. Distal coil was formed. Catheter was  flushed with normal saline and attached to bulb suction. External tubing was  affixed to the skin with 0 proline. Sterile dressing was applied. Old right psoas muscle drainage catheter has been removed without difficulty and  discarded. Sterile dressing was applied. Patient tolerated procedure fairly well. There were no immediate complications. Patient was transferred to recovery in stable condition. Dr. Sam Bautista was  present and performed the procedure. FINDINGS: Initial imaging demonstrated very minimal residual right psoas muscle  fluid collection extending inferiorly. Significant reduction of the size of the  right psoas muscle is demonstrated. Previously placed drainage catheter has been  minimally pulled back. Therefore old drainage catheter was removed given its small position. New drainage catheter was placed as described above. .    CT fluoroscopic guidance demonstrated good position of the access needle as well  as new drainage catheter. Impression IMPRESSION:      1. Successful, uncomplicated new CT-guided right psoas muscle fluid collection  drainage and drainage catheter placement. 2. Successful uncomplicated removal of the old right psoas muscle drainage  catheter. PLAN: Drainage catheter should be attached to bulb suction. Outputs should be  monitored Q shift. Catheter should be flushed in the antegrade fashion with 10  cc of sterile normal saline 3 times a day.           US Results:    Results from [place] encounter on 04/20/17   US ABD LTD   Narrative ULTRASOUND ABDOMEN-LIMITED    INDICATION: Evaluation for fluid collection/abscess at the site of pacemaker  pocket    COMPARISON: Correlation with CT abdomen/pelvis 4/19/2017    TECHNIQUE: Ultrasound evaluation was performed of the left upper quadrant  abdomen in the area of pacemaker. Selected static images are submitted for  review. FINDINGS:  In the area of concern, there is no evidence for focal fluid collection/abscess. Impression IMPRESSION:  As above.               Assessment:     Principal Problem:    Acute paraplegia ([address] (4/20/2017)    Active Problems:    Type 2 diabetes mellitus with diabetic neuropathy ([address] (6/28/2011)      AICD generator infection ([address] (8/20/2012)      Overview: S/p explant 4 leads 8/20/12      Benign hypertensive heart disease with systolic CHF, NYHA class 2 ([address] (9/5/2012)      Decreased calculated glomerular filtration rate (GFR) (3/30/2017)      Overview: Calculated GFR equivalent to that of CKD stage 3 = 30-59 ml/min      Iliopsoas muscle hematoma (3/30/2017)      Psoas hematoma, right, secondary to anticoagulant therapy (3/30/2017)      Impaired mobility and ADLs (3/30/2017)      History of Coumadin therapy ()      Overview: Anticoagulation for chronic atrial fibrillation; Discontinued on 3/30/2017      Sepsis ([address] (4/20/2017)      Psoas abscess, right ([address] (4/20/2017)      Krueger catheter in place on admission (4/20/2017)      Urinary tract infection due to Enterococcus (4/20/2017)      Group B streptococcal infection (4/20/2017)        Plan:     CONTINUE WITH ANTIBIOTICS,  SUPPORTIVE CARE  WILL NEED TO ASSESS  RESULTS OF  NEW DRAIN WITH CT   IMAGING  IN A FEW DAYS  DEPENDING ON THE RESPONSE TO THE NEW DRAIN        Signed By: Mai Antonio MD , FACS    May 11, 2017      PAGER:  [phone]  CELL:  [facility]  OFFICE:  [address] [place] [address]   [phone]

## 2017-05-11 NOTE — ROUTINE PROCESS
TRANSFER - IN REPORT:    Verbal report received from [facility], RN (name) on Warden Parks  being received from [facility] ([facility]) for routine progression of care      Report consisted of patients Situation, Background, Assessment and   Recommendations(SBAR). Information from the following report(s) SBAR and MAR was reviewed with the receiving nurse. Opportunity for questions and clarification was provided. Assessment completed upon patients arrival to unit and care assumed.

## 2017-05-12 ENCOUNTER — APPOINTMENT (OUTPATIENT)
Dept: GENERAL RADIOLOGY | Age: 67
DRG: 853 | End: 2017-05-12
Attending: PHYSICIAN ASSISTANT
Payer: COMMERCIAL

## 2017-05-12 ENCOUNTER — APPOINTMENT (OUTPATIENT)
Dept: GENERAL RADIOLOGY | Age: 67
DRG: 853 | End: 2017-05-12
Attending: INTERNAL MEDICINE
Payer: COMMERCIAL

## 2017-05-12 LAB
ANION GAP BLD CALC-SCNC: 7 MMOL/L (ref 3–18)
ATRIAL RATE: 104 BPM
BASOPHILS # BLD AUTO: 0.1 K/UL (ref 0–0.06)
BASOPHILS # BLD: 1 % (ref 0–3)
BNP SERPL-MCNC: 4099 PG/ML (ref 0–900)
BUN SERPL-MCNC: 21 MG/DL (ref 7–18)
BUN/CREAT SERPL: 10 (ref 12–20)
CALCIUM SERPL-MCNC: 8.3 MG/DL (ref 8.5–10.1)
CALCULATED P AXIS, ECG09: 40 DEGREES
CALCULATED R AXIS, ECG10: 158 DEGREES
CALCULATED T AXIS, ECG11: 103 DEGREES
CHLORIDE SERPL-SCNC: 98 MMOL/L (ref 100–108)
CO2 SERPL-SCNC: 30 MMOL/L (ref 21–32)
CREAT SERPL-MCNC: 2.08 MG/DL (ref 0.6–1.3)
DIAGNOSIS, 93000: NORMAL
DIFFERENTIAL METHOD BLD: ABNORMAL
EOSINOPHIL # BLD: 0 K/UL (ref 0–0.4)
EOSINOPHIL NFR BLD: 0 % (ref 0–5)
ERYTHROCYTE [DISTWIDTH] IN BLOOD BY AUTOMATED COUNT: 17.1 % (ref 11.6–14.5)
GLUCOSE BLD STRIP.AUTO-MCNC: 106 MG/DL (ref 70–110)
GLUCOSE BLD STRIP.AUTO-MCNC: 122 MG/DL (ref 70–110)
GLUCOSE BLD STRIP.AUTO-MCNC: 216 MG/DL (ref 70–110)
GLUCOSE BLD STRIP.AUTO-MCNC: 234 MG/DL (ref 70–110)
GLUCOSE SERPL-MCNC: 89 MG/DL (ref 74–99)
HCT VFR BLD AUTO: 24.6 % (ref 35–45)
HGB BLD-MCNC: 8 G/DL (ref 12–16)
LYMPHOCYTES # BLD AUTO: 17 % (ref 20–51)
LYMPHOCYTES # BLD: 2.3 K/UL (ref 0.8–3.5)
MAGNESIUM SERPL-MCNC: 1.9 MG/DL (ref 1.6–2.6)
MCH RBC QN AUTO: 26.5 PG (ref 24–34)
MCHC RBC AUTO-ENTMCNC: 32.5 G/DL (ref 31–37)
MCV RBC AUTO: 81.5 FL (ref 74–97)
MONOCYTES # BLD: 1.2 K/UL (ref 0–1)
MONOCYTES NFR BLD AUTO: 9 % (ref 2–9)
NEUTS BAND NFR BLD MANUAL: 7 % (ref 0–5)
NEUTS SEG # BLD: 9.7 K/UL (ref 1.8–8)
NEUTS SEG NFR BLD AUTO: 66 % (ref 42–75)
P-R INTERVAL, ECG05: 122 MS
PLATELET # BLD AUTO: 203 K/UL (ref 135–420)
PLATELET COMMENTS,PCOM: ABNORMAL
PMV BLD AUTO: 8.9 FL (ref 9.2–11.8)
POTASSIUM SERPL-SCNC: 3.6 MMOL/L (ref 3.5–5.5)
Q-T INTERVAL, ECG07: 394 MS
QRS DURATION, ECG06: 184 MS
QTC CALCULATION (BEZET), ECG08: 518 MS
RBC # BLD AUTO: 3.02 M/UL (ref 4.2–5.3)
RBC MORPH BLD: ABNORMAL
SODIUM SERPL-SCNC: 135 MMOL/L (ref 136–145)
VENTRICULAR RATE, ECG03: 104 BPM
WBC # BLD AUTO: 13.3 K/UL (ref 4.6–13.2)

## 2017-05-12 PROCEDURE — 93005 ELECTROCARDIOGRAM TRACING: CPT

## 2017-05-12 PROCEDURE — 97530 THERAPEUTIC ACTIVITIES: CPT

## 2017-05-12 PROCEDURE — 71010 XR CHEST PORT: CPT

## 2017-05-12 PROCEDURE — 74011636637 HC RX REV CODE- 636/637: Performed by: HOSPITALIST

## 2017-05-12 PROCEDURE — 74011250637 HC RX REV CODE- 250/637: Performed by: INTERNAL MEDICINE

## 2017-05-12 PROCEDURE — 65660000000 HC RM CCU STEPDOWN

## 2017-05-12 PROCEDURE — 74011250636 HC RX REV CODE- 250/636: Performed by: INTERNAL MEDICINE

## 2017-05-12 PROCEDURE — 85025 COMPLETE CBC W/AUTO DIFF WBC: CPT | Performed by: INTERNAL MEDICINE

## 2017-05-12 PROCEDURE — 80048 BASIC METABOLIC PNL TOTAL CA: CPT | Performed by: INTERNAL MEDICINE

## 2017-05-12 PROCEDURE — 74011250637 HC RX REV CODE- 250/637: Performed by: HOSPITALIST

## 2017-05-12 PROCEDURE — 82962 GLUCOSE BLOOD TEST: CPT

## 2017-05-12 PROCEDURE — 74011000250 HC RX REV CODE- 250: Performed by: UROLOGY

## 2017-05-12 PROCEDURE — 94640 AIRWAY INHALATION TREATMENT: CPT

## 2017-05-12 PROCEDURE — 83880 ASSAY OF NATRIURETIC PEPTIDE: CPT

## 2017-05-12 PROCEDURE — 74011000258 HC RX REV CODE- 258: Performed by: INTERNAL MEDICINE

## 2017-05-12 PROCEDURE — 83735 ASSAY OF MAGNESIUM: CPT | Performed by: INTERNAL MEDICINE

## 2017-05-12 PROCEDURE — 77030020847 HC STATLOK BARD -A

## 2017-05-12 PROCEDURE — 74000 XR ABD (KUB): CPT

## 2017-05-12 PROCEDURE — 73030 X-RAY EXAM OF SHOULDER: CPT

## 2017-05-12 PROCEDURE — 74011636637 HC RX REV CODE- 636/637: Performed by: INTERNAL MEDICINE

## 2017-05-12 RX ORDER — ALLOPURINOL 100 MG/1
50 TABLET ORAL DAILY
Status: DISCONTINUED | OUTPATIENT
Start: 2017-05-13 | End: 2017-05-25 | Stop reason: HOSPADM

## 2017-05-12 RX ORDER — GABAPENTIN 100 MG/1
200 CAPSULE ORAL 2 TIMES DAILY
Status: DISCONTINUED | OUTPATIENT
Start: 2017-05-12 | End: 2017-05-14

## 2017-05-12 RX ADMIN — GABAPENTIN 200 MG: 100 CAPSULE ORAL at 17:40

## 2017-05-12 RX ADMIN — POLYETHYLENE GLYCOL 3350 17 G: 17 POWDER, FOR SOLUTION ORAL at 17:41

## 2017-05-12 RX ADMIN — SODIUM CHLORIDE 50 ML/HR: 900 INJECTION, SOLUTION INTRAVENOUS at 07:08

## 2017-05-12 RX ADMIN — FAMOTIDINE 20 MG: 20 TABLET ORAL at 08:47

## 2017-05-12 RX ADMIN — CEFTRIAXONE SODIUM 2 G: 2 INJECTION, POWDER, FOR SOLUTION INTRAMUSCULAR; INTRAVENOUS at 11:29

## 2017-05-12 RX ADMIN — Medication 10 ML: at 18:00

## 2017-05-12 RX ADMIN — PRAVASTATIN SODIUM 40 MG: 20 TABLET ORAL at 22:17

## 2017-05-12 RX ADMIN — IPRATROPIUM BROMIDE AND ALBUTEROL SULFATE 3 ML: 2.5; .5 SOLUTION RESPIRATORY (INHALATION) at 19:54

## 2017-05-12 RX ADMIN — INSULIN GLARGINE 20 UNITS: 100 INJECTION, SOLUTION SUBCUTANEOUS at 08:48

## 2017-05-12 RX ADMIN — OXYCODONE HYDROCHLORIDE AND ACETAMINOPHEN 1 TABLET: 7.5; 325 TABLET ORAL at 08:49

## 2017-05-12 RX ADMIN — FOLIC ACID 1 MG: 1 TABLET ORAL at 08:47

## 2017-05-12 RX ADMIN — LACTOBACILLUS TAB 1 TABLET: TAB at 08:47

## 2017-05-12 RX ADMIN — Medication 10 ML: at 07:49

## 2017-05-12 RX ADMIN — ALLOPURINOL 100 MG: 100 TABLET ORAL at 08:47

## 2017-05-12 RX ADMIN — POLYETHYLENE GLYCOL 3350 17 G: 17 POWDER, FOR SOLUTION ORAL at 08:50

## 2017-05-12 RX ADMIN — LACTOBACILLUS TAB 1 TABLET: TAB at 17:40

## 2017-05-12 RX ADMIN — OXYCODONE HYDROCHLORIDE AND ACETAMINOPHEN 1 TABLET: 7.5; 325 TABLET ORAL at 04:01

## 2017-05-12 RX ADMIN — CHOLECALCIFEROL TAB 25 MCG (1000 UNIT) 2000 UNITS: 25 TAB at 08:47

## 2017-05-12 RX ADMIN — DOCUSATE SODIUM 100 MG: 100 CAPSULE, LIQUID FILLED ORAL at 08:47

## 2017-05-12 RX ADMIN — POLYETHYLENE GLYCOL 3350 17 G: 17 POWDER, FOR SOLUTION ORAL at 22:17

## 2017-05-12 RX ADMIN — INSULIN LISPRO 6 UNITS: 100 INJECTION, SOLUTION INTRAVENOUS; SUBCUTANEOUS at 22:21

## 2017-05-12 RX ADMIN — INSULIN LISPRO 6 UNITS: 100 INJECTION, SOLUTION INTRAVENOUS; SUBCUTANEOUS at 17:44

## 2017-05-12 RX ADMIN — ACETAMINOPHEN 500 MG: 500 TABLET, COATED ORAL at 20:07

## 2017-05-12 RX ADMIN — Medication 2 TABLET: at 17:40

## 2017-05-12 RX ADMIN — GABAPENTIN 400 MG: 400 CAPSULE ORAL at 08:47

## 2017-05-12 RX ADMIN — IPRATROPIUM BROMIDE AND ALBUTEROL SULFATE 3 ML: 2.5; .5 SOLUTION RESPIRATORY (INHALATION) at 14:14

## 2017-05-12 NOTE — PROGRESS NOTES
Infectious Disease Progress Note    Requested by: Dr. Nigel Monson, dr. Navya Boucher    Reason: sepsis, acute paraplegia    Current abx Prior abx   Ceftriaxone since 4/24 Cefepime 4/20-4/21  vancomycin  4/20-4/24  Gentamicin 4/21-4/24  Metronidazole  4/20-4/24     Lines:   PICC RUE 4/26    Assessment :    77 y.o., right handed female, with an established history of hypertension, complete heart block with permanent pacemaker placed, diabetes mellitus, diabetic peripheral neuropathy, obesity, admitted to [facility] on 4/20/17. Clinical presentation consistent with  Sepsis (POA)  secondary to group B streptococcus bloodstream infection (positive blood cultures 4/20, negative blood cultures 4/21). Most likely source of bloodstream infection is infected right psoas hematoma/abscess. 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 No signs of neurological recovery on today's exam     2 week h/o pain at the site of abdominal pacemaker, tenderness at the site - however, patient doesn't have erythema at the site of pacemaker. No fluid collection noted at pacemaker pocket site per usg 4/24. Quick clearance of bacteremia would argue against endocarditis/endovascular infection. At this time risk of removal of pacemaker/general pocket change exceed the benefit. Discussed with cardiology. Would recommend close monitoring. Abdominal usg 4/24 doesn't reveal evidence of abscess/fluid collection at the site of pacemaker pocket. Enterococcus in urine cultures 4/20 likely colonizer    Acute renal failure: nephrology consult appreciated. Difficult to determine exact etiology of ARF at this time. Slight improvement in creatinine today. Low grade fever overnight - Ct scan 5/4 reveals increasing focal area within the right psoas muscle inferior to the previously drained area.  could represent either spread of infection inferiorly or intramuscular hematoma. S/p ct guided IR drain check - old drain removed. New drain placed inferiorly on 5/10    No evidence of pneumonia    High grade fevers with tm:102 on 5/10 - likely due to bilateral LE dvt - s/p IVC filter placement     Clinically better    Recommendations:    1. cont ceftriaxone tentatively till 6/4/17  2. F/u cbc, temperature  3. Repeat ct scan next week to determine timing of drain removal    Above plan was discussed in details with patient, dr. Enid Treviño. Please call me if any further questions or concerns. Will continue to participate in the care of this patient. subjective:    C/o discomfort around right flank catheter. Denies chills. Denies cp, sob. Unable to move legs. decreased pain at the site of pacemaker left abdomen. No nausea, vomiting. No new rash/itching/joint pain/back pain. Home Medication List    Details   gabapentin (NEURONTIN) 400 mg capsule Take 1 Cap by mouth two (2) times a day. Indications: NEUROPATHIC PAIN  Qty: 30 Cap, Refills: 0    Associated Diagnoses: Iliopsoas muscle hematoma, right, subsequent encounter      cholecalciferol (VITAMIN D3) 1,000 unit tablet Take 2 Tabs by mouth daily. Indications: PREVENTION OF VITAMIN D DEFICIENCY  Qty: 30 Tab, Refills: 0      SITagliptin (JANUVIA) 50 mg tablet Take 50 mg by mouth daily. Indications: type 2 diabetes mellitus      potassium chloride (KLOR-CON M20) 20 mEq tablet Take 20 mEq by mouth two (2) times a day. Indications: HYPOKALEMIA PREVENTION      ondansetron (ZOFRAN ODT) 4 mg disintegrating tablet Take 4 mg by mouth every eight (8) hours as needed for Nausea. cyclobenzaprine (FLEXERIL) 10 mg tablet Take 10 mg by mouth as needed for Muscle Spasm(s). Indications: MUSCLE SPASM      carvedilol (COREG) 6.25 mg tablet Take 1 Tab by mouth two (2) times daily (with meals).  Indications: hypertension  Qty: 30 Tab, Refills: 0    Associated Diagnoses: Benign hypertensive heart disease with systolic CHF, NYHA class 2 (HCC)      acetaminophen (TYLENOL) 325 mg tablet Take 2 Tabs by mouth every four (4) hours as needed (for fever or pain level less than 5/10). Indications: Fever, Pain  Qty: 30 Tab, Refills: 0    Associated Diagnoses: Iliopsoas muscle hematoma, right, subsequent encounter      allopurinol (ZYLOPRIM) 100 mg tablet Take 1 Tab by mouth daily. Indications: GOUT  Qty: 15 Tab, Refills: 0    Associated Diagnoses: Chronic gout, unspecified cause, unspecified site      insulin glargine (LANTUS) 100 unit/mL injection 15 Units by SubCUTAneous route nightly. Indications: type 2 diabetes mellitus  Qty: 1 Vial, Refills: 0    Associated Diagnoses: Type 2 diabetes mellitus with diabetic neuropathy, with long-term current use of insulin ([facility])      docusate sodium (COLACE) 100 mg capsule Take 1 Cap by mouth daily (after breakfast). Indications: Constipation  Qty: 15 Cap, Refills: 0      senna-docusate (PERICOLACE) 8.6-50 mg per tablet Take 2 Tabs by mouth daily (after dinner). Indications: Constipation  Qty: 30 Tab, Refills: 0      oxyCODONE-acetaminophen (PERCOCET 7.5) 7.5-325 mg per tablet Take 1 Tab by mouth every four (4) hours as needed (for pain level greater than 4/10). Max Daily Amount: 6 Tabs. Indications: Pain  Qty: 50 Tab, Refills: 0    Associated Diagnoses: Iliopsoas muscle hematoma, right, subsequent encounter      bumetanide (BUMEX) 1 mg tablet TAKE 1 TABLET TWICE A DAY  Qty: 180 Tab, Refills: 1      pravastatin (PRAVACHOL) 40 mg tablet Take 40 mg by mouth nightly. Indications: DYSLIPIDEMIA      ferrous sulfate 325 mg (65 mg iron) tablet Take 1 Tab by mouth two (2) times daily (with meals).   Qty: 30 Tab, Refills: 0      insulin aspart (NOVOLOG) 100 unit/mL injection INITIATE INSULIN CORRECTIVE PROTOCOL (HR): Normal Insulin Sensitivity  For Blood Sugar (mg/dL) of:    Less than 150 =   0 units          150 -199 =   2 units 200 -249 =   4 units 250 -299 =   6 units 300 -349 =   8 units 350 and above =   10 units If 2 glucose readings are above 200 mg/dL  Qty: 10 mL, Refills: 6             Current Facility-Administered Medications   Medication Dose Route Frequency    albuterol-ipratropium (DUO-NEB) 2.5 MG-0.5 MG/3 ML  3 mL Nebulization Q4HWA RT    mirtazapine (REMERON SOL-TAB) disintegrating tablet 15 mg  15 mg Oral QHS    Lactobacillus Acidoph & Bulgar (FLORANEX) tablet 1 Tab  1 Tab Oral BID    sodium chloride (NS) flush 5-10 mL  5-10 mL IntraVENous Q8H    naloxone (NARCAN) injection 0.1 mg  0.1 mg IntraVENous Multiple    acetaminophen (TYLENOL) tablet 650 mg  650 mg Oral Q4H PRN    0.9% sodium chloride infusion  50 mL/hr IntraVENous CONTINUOUS    famotidine (PEPCID) tablet 20 mg  20 mg Oral DAILY    insulin lispro (HUMALOG) injection   SubCUTAneous AC&HS    0.9% sodium chloride infusion 250 mL  250 mL IntraVENous PRN    cefTRIAXone (ROCEPHIN) 2 g in 0.9% sodium chloride (MBP/ADV) 50 mL MBP  2 g IntraVENous Q24H    insulin glargine (LANTUS) injection 20 Units  20 Units SubCUTAneous DAILY    diphenhydrAMINE (BENADRYL) capsule 50 mg  50 mg Oral Q4H PRN    polyethylene glycol (MIRALAX) packet 17 g  17 g Oral TID    cholecalciferol (VITAMIN D3) tablet 2,000 Units  2,000 Units Oral DAILY    docusate sodium (COLACE) capsule 100 mg  100 mg Oral DAILY AFTER BREAKFAST    gabapentin (NEURONTIN) capsule 400 mg  400 mg Oral BID    oxyCODONE-acetaminophen (PERCOCET 7.5) 7.5-325 mg per tablet 1 Tab  1 Tab Oral Q4H PRN    pravastatin (PRAVACHOL) tablet 40 mg  40 mg Oral QHS    senna-docusate (PERICOLACE) 8.6-50 mg per tablet 2 Tab  2 Tab Oral PCD    acetaminophen (TYLENOL) tablet 500 mg  500 mg Oral Q6H PRN    allopurinol (ZYLOPRIM) tablet 100 mg  100 mg Oral DAILY    folic acid (FOLVITE) tablet 1 mg  1 mg Oral DAILY    ondansetron (ZOFRAN) injection 4 mg  4 mg IntraVENous Q8H PRN    glucose chewable tablet 16 g  16 g Oral PRN    glucagon (GLUCAGEN) injection 1 mg  1 mg IntraMUSCular PRN    dextrose (D50W) injection syrg 12.5-25 g  25-50 mL IntraVENous PRN       Allergies: Vancomycin; Ampicillin; Bactrim [sulfamethoxazole-trimethoprim]; Blueberry; Ciprofloxacin; Codeine; Crestor [rosuvastatin]; Darvocet a500 [propoxyphene n-acetaminophen]; Demerol [meperidine]; Levaquin [levofloxacin]; Lipitor [atorvastatin]; Magnesium oxide; Minocin [minocycline]; Pcn [penicillins]; Pravachol [pravastatin]; Sulfa (sulfonamide antibiotics); Ultracet [tramadol-acetaminophen]; Vicodin [hydrocodone-acetaminophen]; Vytorin 10-10 [ezetimibe-simvastatin]; and Percodan [oxycodone hcl-oxycodone-asa]    Temp (24hrs), Av.9 °F (37.2 °C), Min:98.6 °F (37 °C), Max:99.1 °F (37.3 °C)    Visit Vitals    BP 92/55 (BP 1 Location: Left arm, BP Patient Position: At rest)    Pulse 87    Temp 99.1 °F (37.3 °C)    Resp 20    Ht 5' 7\" (1.702 m)    Wt 117.6 kg (259 lb 3.2 oz)    SpO2 92%    Breastfeeding No    BMI 40.6 kg/m2       ROS: patient unable to communicate fluently. Detailed ros not feasible. Physical Exam:    General: Well developed, well nourished female laying on the bed AAOx3 NAD    General:   awake alert and oriented   HEENT:  Normocephalic, atraumatic, PERRL, EOMI, no scleral icterus or pallor; no conjunctival hemmohage;  nasal and oral mucous are moist and without evidence of lesions. Neck supple, no bruits. Lymph Nodes:   no cervical, axillary or inguinal adenopathy   Lungs:   non-labored, bilaterally clear to auscultation- no crackles wheezes rales or rhonchi   Heart:   s1 and s2 irregular; no rubs or gallops, no edema, + pedal pulses   Abdomen:  soft, non-distended, active bowel sounds, no hepatomegaly, no splenomegaly.  no tenderness over the left abdominal pacemaker, no overlying erythema or fluctuance, new drain right lower abdomen   Genitourinary:  deferred   Extremities:   no clubbing, cyanosis; no joint effusions or swelling; muscle mass appropriate for age   Neurologic:  No gross focal sensory abnormalities; 5/5 muscle strength to upper extremities. 0/5 strength in lower extremities.   Cranial nerves intact                        Skin:  Surgical scars abdomen, left neck well healed   Back:   no paraspinal muscle guarding or rigidity, right CVA tenderness     Psychiatric:  No suicidal or homicidal ideations, appropriate mood and affect         Labs: Results:   Chemistry Recent Labs      05/10/17   2335  05/10/17   0327   GLU  130*  92   NA  135*  136   K  3.9  3.6   CL  100  101   CO2  26  27   BUN  20*  20*   CREA  1.79*  1.77*   CA  8.2*  8.2*   AGAP  9  8   BUCR  11*  11*      CBC w/Diff Recent Labs      05/12/17   0820  05/10/17   2335  05/10/17   0327   WBC  13.3*  11.8   --    RBC  3.02*  3.46*   --    HGB  8.0*  9.2*  8.9*   HCT  24.6*  28.1*  27.1*   PLT  203  202   --    GRANS  PENDING  75*   --    LYMPH  PENDING  16*   --    EOS  PENDING  0   --       Microbiology Recent Labs      05/10/17   2135  05/10/17   2130   CULT  NO GROWTH 2 DAYS  NO GROWTH 2 DAYS          RADIOLOGY:    All available imaging studies/reports in [facility] care for this admission were reviewed    Dr. Jori Jay, Infectious Disease Specialist  [phone]  May 12, 2017  3:49 PM

## 2017-05-12 NOTE — PROGRESS NOTES
[facility]  Pulmonary, Critical Care, and Sleep Medicine    Name: Pamela Hayes MRN: [id number]   : 1950 Hospital: [address] [place] [address]   Date: 2017        IMPRESSION:   · Hypoxia d/t atelectasis  · Atypical Chest pain- doubt cardiac or pulmonary etiology. ?? Diaphragmatic irritation from abdominally placed pacemaker? ? Vs GI/ esophageal etiology. · - Patient with known DVT, s/p IVC filter. V/Q scan on 17: low probability. Small PE is possible but doubtful currently and given current clinical status not a candidate for systemic anticoagulation  · Sepsis - 2/2 infected psoas hematoma/epidural abscess with neurological compromise  · Psoas abscess - Gram A Beta Hemolytic strep abscess and blood cx sens pending  · Paraplegia- Since , likely 2/2 inflammatory venous thrombosis of dural veins adjacent to psoas abscess, resulting in spinal cord infarct  · Enterococcus faecalis in urine (gent susceptible)  · Bacteremia   · Infected pacer pocket  · Psoas hematoma  · LISBETH - D/t neurogenic bladder and component of ATN  · NSTEMI   · Hx of CHF and Afib  · Prior complicated pacer placement (apparently upper extremity and central vein stenoses)  · Inability to obtain MRI (pacer)     Patient Active Problem List   Diagnosis Code    Nonischemic cardiomyopathy ([facility] I42.8    History of complete heart block Z86.79    Biventricular implantable cardioverter-defibrillator in situ Z95.810    Left bundle branch block (LBBB) on electrocardiogram I44.7    Type 2 diabetes mellitus with diabetic neuropathy ([facility]) E11.40    Dyslipidemia E78.5    Diabetic neuropathy associated with type 2 diabetes mellitus ([facility] E11.40    Obstructive sleep apnea on CPAP G47.33, Z99.89    AICD generator infection ([facility] T82. 7XXA    Difficult airway for intubation T88. 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 1XXA    Impaired mobility and ADLs Z74.09    History of Coumadin therapy Z79.01    Gout M10.9    Acute paraplegia ([facility]) G82.20    Sepsis ([address] A41.9    Psoas abscess, right ([facility] K68.12    Krueger catheter in place on admission Z96.0    Urinary tract infection due to Enterococcus N39.0, B95.2    Group B streptococcal infection A49.1      PLAN:   · SpO2 >92%; titrate supp O2 PRN; pt currently resting in mild stress on 2LPM NC - note, when pt was sit in a more semi-fowlers position from lying supine, breathing and complaints of SOB improved, as well as complaints of CP/abd pain. · Aspiration precautions - HOB >45'  · Aggressive pulmonary toilet; initiate bronchial hygiene protocol - encourage use of ICS  · Agree with EZPAP device for breathing treatments - con't duo-nebs q4 while awake for now;   · Will obtain CXR, KUB, proBNP - will follow for results  · If respiratory status worsens or mentation con't to decline, will obtain ABG  · Encourage pt to eat, and HOB >45' for meals to help with GE junction and acid reflux  · Agree with Maalox; con't pepcid and bowel regimen. · Will follow peripherally and be available for questions. Thank you for this referral.      Subjective/Interval History:     Patient is a 77 y.o. female w/hx of HTN, CHF, heart block s/p pacer, multiple vascular procedures in neck veins with residual stenoses, prior Hx of DVT, DM, Afib, recent admission for Rt psoas hematoma. Was readmitted on 04/20/17 for acute paraplegia, fevers, sweats.  Repeat CT demonstrated increased size of psoas fluid collection. Labs notable for leukocytosis and LISBETH. Pt brought to IR and 300ml of pus aspirated. Hypotensive upon return to floor, and still so after ~1500 ml IVF. Pt was transferred to ICU on 04/20/17 for hypotension s/p procedure; recovered with pressor support and fluids. Pt was then transferred out of ICU floor. Last seen by Pulmonary Beni Mackey - 04/23/17 05/12/17:  - Pulmonary was consulted today for pt d/t SOB x2-3 days without clear cause. Pt states that her chest and abd \"hurt\", which is causing her to have SOB. Pt states the pain and SOB is worse while lying flat and taking deep breaths, improves with sitting up. Per pt and RN, pt has not eaten much the last couple of days - pain and SOB thought to possibly relate to GERD/acid reflux. Maalox was ordered for pt today to help with abd pain. In addition, workup of this SOB has included cardiac enzymes (3 sets, all negative); VQ scan - low probability for PE; bilateral venous dopplers, which shows (+) DVT in R Common Femoral  Vein and (+) DVT in L popliteal vein. Pt then underwent emergent IVC placement d/t inability to anticoagulate using heparin 2/2 hx Psoas muscle hematoma. - Upon exam, pt is alert, awake, in mild distress - improved with pt positioning; pt able to speak in full sentences without difficulty. Pt states pain is located in middle of chest, unable to fully articulate severity of pain, but states that it is constant and improved with sitting up in bed. SpO2 currently 98% on RA  - Upon re exam, pt was upright in bed with family at bedside; pt was attempting to eat her dinner, appears NAD, resting comfortably on 2LPM NC; appears very sleepy; is not currently c/o of any pain or SOB. - Denies any H/A, palpitations, F/C, N/V/D.           ROS:Pertinent items are noted in HPI.     Objective:   Vital Signs:    Visit Vitals    /62 (BP 1 Location: Left arm, BP Patient Position: Sitting)    Pulse (!) 101    Temp 98.5 °F (36.9 °C)    Resp 18    Ht 5' 7\" (1.702 m)    Wt 117.6 kg (259 lb 3.2 oz)    SpO2 92%    Breastfeeding No    BMI 40.6 kg/m2       O2 Device: Nasal cannula   O2 Flow Rate (L/min): 2 l/min   Temp (24hrs), Av.9 °F (37.2 °C), Min:98.5 °F (36.9 °C), Max:99.1 °F (37.3 °C)       Intake/Output:   Last shift:      701 - 1900  In: 240 [P.O.:240]  Out: -   Last 3 shifts: 05/10 1901 - 700  In: 2001.9 [P.O.:320; I.V.:1681.9]  Out: 1835 [Urine:1800; Drains:35]    Intake/Output Summary (Last 24 hours) at 17 1535  Last data filed at 17 1429   Gross per 24 hour   Intake           598.92 ml   Output              300 ml   Net           298.92 ml        Physical Exam:    General: Alert, awake, mild distress on 2LPM NC   HEENT: Normal, PERRLA, EOMI, fundi benign   Neck: No abnormally enlarged lymph nodes. Resp: Symmetrical chest rise; mod AE bilat; diminished bibasilar - pt however is also not taking very deep breaths; CTAB; no wheezes/rhonchi/rales noted. CV: RRR, S1S2 normal, no m/r/g; no chest wall tenderness   Abdomen: Abdomen is soft; (+) BS x4;  No significant tenderness, masses, organomegaly or guarding   Extremity: negative, 1+ edema, cyanosis, clubbing, peripheral pulses 2+ and symmetric   Neuro: alert, sleepy; can follow commands; grossly non-focal   Skin: Skin color, texture, turgor normal. No rashes or lesions.  PICC L upper arm        DATA:  Labs:  Recent Labs      17   0820  05/10/17   2335  05/10/17   0327   WBC  13.3*  11.8   --    HGB  8.0*  9.2*  8.9*   HCT  24.6*  28.1*  27.1*   PLT  203  202   --      Recent Labs      17   0820  05/10/17   2335  05/10/17   0327   NA  135*  135*  136   K  3.6  3.9  3.6   CL  98*  100  101   CO2  30  26  27   GLU  89  130*  92   BUN  21*  20*  20*   CREA  2.08*  1.79*  1.77*   CA  8.3*  8.2*  8.2*   MG  1.9  1.7   --        PFT:    N/A                                                   Echo [17]:  SUMMARY:  Left ventricle: Size was at the upper limits of normal. Systolic function  was mildly reduced by visual assessment. Ejection fraction was estimated  to be 45 %. There was severe hypokinesis of the basal-mid inferoseptal,  apical septal, and apical wall(s). Structure noted in the apex as  mentioned on previous studies. Possible muscle band vs fibrinous mass. Imaging: CXR & KUB pending     NM LUNG PERFUSION W VENT [05/11/17]: There is a matched defect at the bilateral lower lobes, more prominent on  ventilatory scan. This corresponds with the opacity seen on same day chest  x-ray. No evidence for moderate to large segmental mismatched perfusion defect.     IMPRESSION:  Low probability VQ scan for pulmonary embolism. []I have personally reviewed the patients radiographs  []Radiographs reviewed with radiologist   []No change from prior, tubes and lines in adequate position  []Improved   []Worsening        Ivan Toledo PA-C      Pulmonary Staff:  I have independently seen and evaluated the patient. I agree with above evaluation, assessment and plan of care. I have also discussed above and case with hospitalist, Dr. Shawn Guerra.      Clinical Care time evaluating and coordinating care: 50 min

## 2017-05-12 NOTE — PROGRESS NOTES
Problem: Mobility Impaired (Adult and Pediatric)  Goal: *Acute Goals and Plan of Care (Insert Text)  STGs to be addressed within 3 days:  1. Bed mobility: Supine to sit to supine MaxAx1 with HR for meals. 2. Activity tolerance: Tolerate EOB > 15 minutes for ADLs. 3. Transfers: SPT-->to chair max/mod A with LRAD for ADLs. 4. Pt demo fair seated balance in preparation for functional transfers. LTGs to be addressed within 7 days:  1. Transfers: SB-->to chair/wc max/mod A for ADLs. 2. Activity tolerance: Tolerated > 1 hr in chair for change of position. 3. Patient Education: Independent with HEP for home safety. PHYSICAL THERAPY TREATMENT     Patient: Nikolas Chappell (59 y.o. female)  Date: 5/12/2017  Diagnosis: Acute paraplegia  Acute paraplegia ([facility] Acute paraplegia [facility])       Precautions: Fall, Skin  Chart, physical therapy assessment, plan of care and goals were reviewed. ASSESSMENT:  Pt presents with significantly decreased tolerance to activity and with minimal mobility training pt is able to reports 10/10 chest pain. Nursing made aware and PT treatment stopped to hand off care to medical team.  Will follow for developments. Progression toward goals:  [ ]      Improving appropriately and progressing toward goals  [ ]      Improving slowly and progressing toward goals  [X]      Not making progress toward goals and plan of care will be adjusted       PLAN:  Patient continues to benefit from skilled intervention to address the above impairments. Continue treatment per established plan of care. Discharge Recommendations:  SCI unit rehab  Further Equipment Recommendations for Discharge:  N/A       SUBJECTIVE:   Patient stated 10.       OBJECTIVE DATA SUMMARY:   Critical Behavior:  Neurologic State: Alert  Orientation Level: Oriented X4  Cognition: Follows commands, Appropriate decision making  Safety/Judgement: Awareness of environment, Fall prevention  Functional Mobility Training:  Bed Mobility:  Rolling: Total assistance  Scooting: Total assistance;Assist [id number]:  Sitting:  (cannot tolerate)  Therapeutic Exercises:   Heel slide PROM B LE\"s  Pain:  Pt reports 10/10 pain or discomfort prior to treatment. Pt reports 10/10 pain or discomfort post treatment. Pt cannot readily quantify level and after 4-5 requests pt is able to faintly report 10/10 in chest.  Activity Tolerance:   Poor- Pt is visibly uncomfortable and SOB at onset. Pt cannot reply in more than 1-2 word responses. With initiation of bed mobility for positioning pt presents with increased discomfort and clutching at chest.    Please refer to the flowsheet for vital signs taken during this treatment.   After treatment:   [ ] Patient left in no apparent distress sitting up in chair  [X] Patient left in no apparent distress in bed  [X] Call bell left within reach  [X] Nursing notified  [ ] Caregiver present  [ ] Bed alarm activated      Daria Torrez PTA   Time Calculation: 18 mins

## 2017-05-12 NOTE — DIABETES MGMT
GLYCEMIC CONTROL PLAN OF CARE    Assessment/Recommendations:  Patient is 77year old with past medical history including type 2 diabetes mellitus, diabetic neuropathy, CHF, dyslipidemia, obstructive sleep apnea, complete heart block, AICD placement - was being prepared for discharge from rehab when patient develop sudden weakness and c/o of increasing burning sensation of the right leg. Patient was admitted to acute care on 04/20/2017. Noted:  Acute paraplegia. Sepsis. Iliopsoas hematoma, infected. DVT bilateral lower extremities. Status post IVC filter on 05/11/2017. Type 2 diabetes mellitus with A1C of 8.0% (03/30/2017). POC BG range on 05/11/2017: 126-196 mg/dL. POC BG report on 05/12/2017 at time of review: 106 mg/dL. Patient stated that she is not eating much and trying to drink glucerna. Recommendations: Continue monitoring and current insulin orders: basal and correctional lispro insulin. Most recent blood glucose values:    Results for Ruben Milian (MRN [id number]) as of 5/12/2017 11:50   Ref. Range 5/11/2017 06:53 5/11/2017 11:39 5/11/2017 15:57 5/11/2017 22:00   GLUCOSE,FAST - POC Latest Ref Range: 70 - 110 mg/dL 146 (H) 196 (H) 170 (H) 126 (H)     Results for Ruben Milian (MRN [id number]) as of 5/12/2017 11:50   Ref. Range 5/12/2017 07:02 5/12/2017 11:29   GLUCOSE,FAST - POC Latest Ref Range: 70 - 110 mg/dL 106 122 (H)     Current A1C of 8.0% (03/30/2017) is equivalent to average blood glucose of 183 mg/dL during the past 2-3 months.      Current hospital diabetes medications:   Lantus insulin 20 units every morning. Correctional Lispro insulin ACHS. Very resistant dose.      Previous day's insulin requirements: 05/11/2017  Lantus insulin: 20 units  Lispro: 6 units  TDD: 26 units.      Home diabetes medications: Per educational notes, 03/31/2017:  Januvia 50 mg daily every morning.   Lantus insulin 15 units nightly   Novolog insulin per sliding scale      Diet: Diabetic Consistent Carbohydrate; regular; nutr suppl: glucerna shake with all meals. Goals: Patient's blood glucose will be within target range on  mg/dL by 05/15/2017.     Education:  __x__Refer to Diabetes Education Record: 3/31/2017            ____Education not indicated at this time      Praveen Hoffman RN

## 2017-05-12 NOTE — PROGRESS NOTES
S/p ivc filter placement  Right groin cannulation site assessed  Bandage removed  No bleeding, no ecchymosis, no signs of hematoma  Will sign off, available as needed

## 2017-05-12 NOTE — OP NOTES
[address]    Name:  Nikos Tovar  MR#:  [id number]  :  1950  Account #:  [de-identified]  Date of Adm:  2017  Date of Surgery:  2017      ATTENDING PHYSICIAN:  eDbi Sawant M.D. PREOPERATIVE DIAGNOSIS:  Deep venous thrombosis of the  bilateral lower extremities, with inability to anticoagulate and in need of  an inferior vena cava filter. POSTOPERATIVE DIAGNOSIS:  Deep venous thrombosis of the  bilateral lower extremities, with inability to anticoagulate and in need of  an inferior vena cava filter. PROCEDURES PERFORMED  1. Ultrasound-guided access of the right common femoral vein. 2. Venacavogram.  3. Catheter in the inferior vena cava. 4. Inferior vena cava filter, with a Celect filter. ESTIMATED BLOOD LOSS:  Less than 50 mL. SPECIMENS REMOVED:  None. ANESTHESIA:  Local anesthetic only. CULTURES:  None. DRAINS:  None. INDICATIONS FOR PROCEDURE:  The patient is a 26-year-old  female with a DVT of the bilateral lower extremities and the inability to  anticoagulate. The patient was recommended for an inferior vena cava  filter placement. INFORMED CONSENT:  The patient was given the risks and benefits  of the procedure including, but not limited to, bleeding, infection,  damage to adjacent structures, myocardial infarction, stroke, and  death, as well as filter malfunction, filter migration, and filter breakage. The patient was understanding of all the risks and underwent the  procedure. OPERATIVE FINDINGS:  The right common iliac vein and external  iliac vein were both patent without stenosis, and the inferior vena cava  was patent without stenosis. It was of an adequate size for filter  placement. No abnormal venous anatomy was identified. DESCRIPTION OF PROCEDURE:  The patient was correctly identified  in the pre-catheterization area and taken to the catheterization lab in  stable condition.   The patient had a pre-incision time-out prior to any  incision. The patient was prepped and draped in normal sterile fashion  according to guidelines on aseptic technique. An ultrasound was then  used to visualize the right common femoral vein. A picture was taken  and kept with the patient's chart. We then were able to take a single  wall entry needle and access into the common femoral vein after  numbing her up appropriately using 1% lidocaine. Once the needle tip  was identified within the vein, and there was positive blood return, a  Magic torque wire was then placed. A small skin incision was created  using a #11-blade. A ContraFlush catheter was then placed within the  external iliac vein on the right side. Then, a venogram was then  performed with a venacavogram, with the above findings. We then  brought our catheter up into the IVC, and the wire was then replaced. Then, we were able to place a filter deployment sheath directly  underneath the lowest renal vein. We then were able deploy the  Celect filter in appropriate positioning underneath the renal veins  without any tilt. We then removed the deployment system and held  pressure for 5 minutes, with good hemostasis. The patient tolerated  the procedure well without any issue.         Ariana Mann MD    EC / [address]  D:  05/11/2017   16:26  T:  05/11/2017   22:27  Job #:  [id number]

## 2017-05-12 NOTE — PROGRESS NOTES
SUBJECTIVE:    Feeling better. SOB present. C/o Left shoulder pain. No nausea or vomiting. No headaches or dizziness. No Abdominal pain or N/V currently. OBJECTIVE:    Visit Vitals    /62 (BP 1 Location: Left arm, BP Patient Position: Sitting)    Pulse (!) 101    Temp 98.5 °F (36.9 °C)    Resp 18    Ht 5' 7\" (1.702 m)    Wt 117.6 kg (259 lb 3.2 oz)    SpO2 91%    Breastfeeding No    BMI 40.6 kg/m2     RS: diminished bilaterally anteriorly but good air entry posteriorly   CVS: RRR  GI: ND, BS +, drain +. NT   Extremities: trace pedal edema  CNS: motor strength 0-1/5 in both LEs. 5/5 un UEs  General: NAD, AWAKE, Follows commands    ASSESSMENT:    1. Sepsis secondary to infected right psoas hematoma/epidural abscess with neurological compromise resulting in paraplegia. S/p CT guided right new psoas muscle drainage catheter placement and old drain removal.  2. 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 3. h/o intermittent pain at the site of abdominal pacemaker  4. Heart block s/p AICD 2005 with upgrade to BiV later that year, removed however in 2012 due to trauma and infection. New biventricular pacemaker LUQ 9/2012 by Dr. 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 5. Elevated troponin likely demand ischemia in setting of sepsis. 6. H/p transient nonischemic CMY with EF 45%  7. Acute kidney injury due to neurogenic bladder and Component of ATN. 8. Acute on chronic anemia s/p 2 units  9. Diabetes mellitus. 10.Dyslipidemia. 6. H/o HTN. 12. LBBB. 13. HALI on CPAP. 14. Left shoulder pain  15. Atypical Chest pain   16. Hypoxia due to atelectasis  17. Bilateral LE DVT s/p IVC    PLAN:    Cont antibiotic per ID  Cont current management including perez catheter  Xray shoulder ordered  Patient will have low grade fever and leukocytosis due to acute DVT but can not give heparin due to hematoma.      --> TOTAL TIME GREATER THAN 30 MINUTES    CMP: Lab Results   Component Value Date/Time     (L) 05/12/2017 08:20 AM    K 3.6 05/12/2017 08:20 AM    CL 98 (L) 05/12/2017 08:20 AM    CO2 30 05/12/2017 08:20 AM    AGAP 7 05/12/2017 08:20 AM    GLU 89 05/12/2017 08:20 AM    BUN 21 (H) 05/12/2017 08:20 AM    CREA 2.08 (H) 05/12/2017 08:20 AM    GFRAA 29 (L) 05/12/2017 08:20 AM    GFRNA 24 (L) 05/12/2017 08:20 AM    CA 8.3 (L) 05/12/2017 08:20 AM    MG 1.9 05/12/2017 08:20 AM        CBC:   Lab Results   Component Value Date/Time    WBC 13.3 (H) 05/12/2017 08:20 AM    HGB 8.0 (L) 05/12/2017 08:20 AM    HCT 24.6 (L) 05/12/2017 08:20 AM     05/12/2017 08:20 AM

## 2017-05-12 NOTE — ROUTINE PROCESS
Bedside and Verbal shift change report given to [facility] RN (oncoming nurse) by Sandeep Milian RN (offgoing nurse). Report given with SBAR, Kardex, Intake/Output, MAR, Accordion and Recent Results.

## 2017-05-12 NOTE — PROGRESS NOTES
RENAL DAILY PROGRESS NOTE    Patient: Shanique Lino               Sex: female          DOA: 4/20/2017  3:36 PM        YOB: 1950      Age:  77 y.o.        LOS:  LOS: 22 days     Subjective:     Shanique Lino is a 77 y.o.  who presents with Acute paraplegia  Acute paraplegia ([facility] [address] Asked to evaluate for acute/crf stage 3.admitted with paraplegias,psoas muscle hematoma,s/p drain placement. hx of dm,gout  Chief complains: Patient denies nausea, vomiting, chest pain, dizziness, shortness of breath or headache.  - Reviewed last 24 hrs events     Current Facility-Administered Medications   Medication Dose Route Frequency    albuterol-ipratropium (DUO-NEB) 2.5 MG-0.5 MG/3 ML  3 mL Nebulization Q4HWA RT    mirtazapine (REMERON SOL-TAB) disintegrating tablet 15 mg  15 mg Oral QHS    Lactobacillus Acidoph & Bulgar (FLORANEX) tablet 1 Tab  1 Tab Oral BID    sodium chloride (NS) flush 5-10 mL  5-10 mL IntraVENous Q8H    naloxone (NARCAN) injection 0.1 mg  0.1 mg IntraVENous Multiple    acetaminophen (TYLENOL) tablet 650 mg  650 mg Oral Q4H PRN    0.9% sodium chloride infusion  50 mL/hr IntraVENous CONTINUOUS    famotidine (PEPCID) tablet 20 mg  20 mg Oral DAILY    insulin lispro (HUMALOG) injection   SubCUTAneous AC&HS    0.9% sodium chloride infusion 250 mL  250 mL IntraVENous PRN    cefTRIAXone (ROCEPHIN) 2 g in 0.9% sodium chloride (MBP/ADV) 50 mL MBP  2 g IntraVENous Q24H    insulin glargine (LANTUS) injection 20 Units  20 Units SubCUTAneous DAILY    diphenhydrAMINE (BENADRYL) capsule 50 mg  50 mg Oral Q4H PRN    polyethylene glycol (MIRALAX) packet 17 g  17 g Oral TID    cholecalciferol (VITAMIN D3) tablet 2,000 Units  2,000 Units Oral DAILY    docusate sodium (COLACE) capsule 100 mg  100 mg Oral DAILY AFTER BREAKFAST    gabapentin (NEURONTIN) capsule 400 mg  400 mg Oral BID    oxyCODONE-acetaminophen (PERCOCET 7.5) 7.5-325 mg per tablet 1 Tab  1 Tab Oral Q4H PRN    pravastatin (PRAVACHOL) tablet 40 mg  40 mg Oral QHS    senna-docusate (PERICOLACE) 8.6-50 mg per tablet 2 Tab  2 Tab Oral PCD    acetaminophen (TYLENOL) tablet 500 mg  500 mg Oral Q6H PRN    allopurinol (ZYLOPRIM) tablet 100 mg  100 mg Oral DAILY    folic acid (FOLVITE) tablet 1 mg  1 mg Oral DAILY    ondansetron (ZOFRAN) injection 4 mg  4 mg IntraVENous Q8H PRN    glucose chewable tablet 16 g  16 g Oral PRN    glucagon (GLUCAGEN) injection 1 mg  1 mg IntraMUSCular PRN    dextrose (D50W) injection syrg 12.5-25 g  25-50 mL IntraVENous PRN       Objective:     Visit Vitals    /62 (BP 1 Location: Left arm, BP Patient Position: Sitting)    Pulse (!) 101    Temp 98.5 °F (36.9 °C)    Resp 18    Ht 5' 7\" (1.702 m)    Wt 117.6 kg (259 lb 3.2 oz)    SpO2 91%    Breastfeeding No    BMI 40.6 kg/m2       Intake/Output Summary (Last 24 hours) at 05/12/17 1221  Last data filed at 05/11/17 1817   Gross per 24 hour   Intake           468.92 ml   Output              300 ml   Net           168.92 ml       Physical Examination:     GEN: AAO X 3, NAD  RS: Chest is bilateral equal, no wheezing / rales / crackles  CVS: S1-S2 heard, RRR, No S3 / murmur  Abdomen: Soft, Non tender, Not distended, Positive bowel sounds, no organomegaly, no CVA / supra pubic tenderness  Extremities: + edema, no cyanosis, skin is warm on touch  CNS: Awake & follows commands, CN II-XII are grossly intact. HEENT: Head is atraumatic, PERRLA, conjunctiva pink & non icteric. No JVD or carotid bruit   Psychiatric: Normal mood, affect, judgement & memory    Musculoskeletal: No gross joints or bone deformities   Lymph Node: No palpable cervical, axillary or groin lymphadenopathy.       Data Review:      Labs:     Hematology: Recent Labs      05/12/17   0820  05/10/17   2335  05/10/17   0327   WBC  13.3*  11.8   --    HGB  8.0*  9.2*  8.9*   HCT  24.6*  28.1*  27.1*     Chemistry: Recent Labs      05/12/17   0820  05/10/17   2335  05/10/17 0327   BUN  21*  20*  20*   CREA  2.08*  1.79*  1.77*   CA  8.3*  8.2*  8.2*   K  3.6  3.9  3.6   NA  135*  135*  136   CL  98*  100  101   CO2  30  26  27   GLU  89  130*  92        Images:    XR (Most Recent). CXR reviewed by me and compared with previous CXR   Results from [place] encounter on 04/20/17   XR CHEST SNGL V   Narrative Chest x-ray, semierect AP view, time 1815 hours on 5/10/2017:        INDICATION:    Shortness of breath. History of hypertension, diabetes. COMPARISON STUDY: Chest x-ray on 5/4/2017, 4/20/2017. FINDINGS:    Lungs are moderate to moderately hypoventilated. There are findings of previous  cardiothoracic surgery with sternal sutures in place. There is mild to moderate cardiomegaly but cardiac outlines are poorly defined. Pulmonary vascularity is mildly engorged. In right lower and midlung there are asymmetric densities, indicating pneumonic  infiltrates, and/atelectatic changes. There are ill-defined opacity at the bases of both lungs, indicating  infiltrates, and central atelectatic changes with suspected bilateral basilar  pleural effusions. Impression IMPRESSION:    Moderate to marked pulmonary hypoventilation. Mild-to-moderate cardiomegaly with mild pulmonary vascular congestion. Interstitial pulmonary edema is probably present. Pneumonic infiltrates, and central atelectasis in right lung as described. Bibasilar atelectasis/infiltrates and bibasilar pleural effusions suspected. CT (Most Recent)   Results from Hospital Encounter encounter on 04/20/17   CT DRAIN ABS W CATH PERC   Narrative PREOPERATIVE DIAGNOSIS: Right psoas muscle abscess. POSTOPERATIVE DIAGNOSIS: Same    INDICATION: Residual right psoas muscle fluid collection. ATTENDING: JOANN [place]: None. PROCEDURES:  1. New CT-guided drainage of a small right psoas muscle residual fluid  collection and drainage catheter placement.   2. Old right psoas muscle drainage catheter removal.    ANESTHESIA: Local 1% lidocaine as well as moderate intravenous sedation with  Versed and fentanyl given and monitored per independently trained interventional  radiology nurse under my direct supervision for 30 minutes. Please see detailed  nursing records for medication dosing. CONTRAST: None. COMPLICATIONS: None    DRAIN: No    CATHETER: None. EBL: None. SPECIMEN: None. TECHNIQUE:     All CT scans at this facility are performed using dose optimization technique as  appropriate to a performed exam, to include automated exposure control,  adjustment of the mA and/or kV according to patient size (including appropriate  matching for site-specific examinations), or use of iterative reconstruction  technique. The risks, benefits, and alternatives were discussed. Written and verbal consent  obtained. Patient was placed supine on CT table and the right lower quadrant  region was prepped and draped in usual sterile fashion. Maximum sterile barrier  technique was used. Timeout was performed. 1% lidocaine was then used. Preliminary CT imaging of the lower thoracic and lumbar spine was obtained. Under direct CT fluoroscopy guidance using 18-gauge trocar needle was advanced  down into collection. J-wire was advanced through the needle. Serial dilatations  were performed. 8.5 [employer] all-purpose drainage catheter was placed into  collection under direct CT fluoroscopy. Distal coil was formed. Catheter was  flushed with normal saline and attached to bulb suction. External tubing was  affixed to the skin with 0 proline. Sterile dressing was applied. Old right psoas muscle drainage catheter has been removed without difficulty and  discarded. Sterile dressing was applied. Patient tolerated procedure fairly well. There were no immediate complications. Patient was transferred to recovery in stable condition.  Dr. Chad Morataya was  present and performed the procedure. FINDINGS: Initial imaging demonstrated very minimal residual right psoas muscle  fluid collection extending inferiorly. Significant reduction of the size of the  right psoas muscle is demonstrated. Previously placed drainage catheter has been  minimally pulled back. Therefore old drainage catheter was removed given its small position. New drainage catheter was placed as described above. .    CT fluoroscopic guidance demonstrated good position of the access needle as well  as new drainage catheter. Impression IMPRESSION:      1. Successful, uncomplicated new CT-guided right psoas muscle fluid collection  drainage and drainage catheter placement. 2. Successful uncomplicated removal of the old right psoas muscle drainage  catheter. PLAN: Drainage catheter should be attached to bulb suction. Outputs should be  monitored Q shift. Catheter should be flushed in the antegrade fashion with 10  cc of sterile normal saline 3 times a day. EKG Results for orders placed or performed in visit on 11/01/16   AMB POC EKG ROUTINE W/ 12 LEADS, INTER & REP     Status: None    Narrative    AV sequentially paced rhythm with a rate of 67. I have personally reviewed the old medical records and patient's labs    Plan / Recommendation:      1. Acute/crf stage 3.neurogenic bladder. improving. poor record of intake and output. discussed with her nurse. talked with  and daughter. continue to monitor  2.anemia,psoas muscle hematoma,s/p drain placement    D/w Dr. Ruby Pratt MD  Nephrology  5/12/2017

## 2017-05-12 NOTE — PROGRESS NOTES
Progress Note    Patient: Britt Severs MRN: [id number]  SSN: [id number]    YOB: 1950  Age: 77 y.o. Sex: female      Admit Date: 4/20/2017    LOS: 22 days     Assessment:   76 yo F w paraplegia on [facility] for DVT admitted with sepsis 2/2 right infected psoas hematoma sp CT guided drainage 4/20, repeat drainage 5/10 w removal of previous drain. Sp IVC filter placement yesterday. .        Plan:     LOULOU output minimal.   No fevers. WBC 13 today. Cont drain. Repeat CT Monday if continues to improve. Subjective:     Feels improved. Somewhat drowsy. Objective:     Vitals:    05/12/17 0000 05/12/17 0400 05/12/17 0630 05/12/17 0753   BP: 101/62 142/72  92/55   Pulse: 98 99  87   Resp: 20 20  20   Temp: 99 °F (37.2 °C) 98.6 °F (37 °C)  99.1 °F (37.3 °C)   TempSrc:       SpO2: 93% 93%  92%   Weight:   259 lb 3.2 oz (117.6 kg)    Height:            Intake and Output:  Current Shift:    Last three shifts: 05/10 1901 - 05/12 0700  In: 2001.9 [P.O.:320;  I.V.:1681.9]  Out: 1835 [Urine:1800; Drains:35]    Current Facility-Administered Medications   Medication Dose Route Frequency    albuterol-ipratropium (DUO-NEB) 2.5 MG-0.5 MG/3 ML  3 mL Nebulization Q4HWA RT    mirtazapine (REMERON SOL-TAB) disintegrating tablet 15 mg  15 mg Oral QHS    Lactobacillus Acidoph & Bulgar (FLORANEX) tablet 1 Tab  1 Tab Oral BID    sodium chloride (NS) flush 5-10 mL  5-10 mL IntraVENous Q8H    naloxone (NARCAN) injection 0.1 mg  0.1 mg IntraVENous Multiple    acetaminophen (TYLENOL) tablet 650 mg  650 mg Oral Q4H PRN    0.9% sodium chloride infusion  50 mL/hr IntraVENous CONTINUOUS    famotidine (PEPCID) tablet 20 mg  20 mg Oral DAILY    insulin lispro (HUMALOG) injection   SubCUTAneous AC&HS    0.9% sodium chloride infusion 250 mL  250 mL IntraVENous PRN    cefTRIAXone (ROCEPHIN) 2 g in 0.9% sodium chloride (MBP/ADV) 50 mL MBP  2 g IntraVENous Q24H    insulin glargine (LANTUS) injection 20 Units  20 Units SubCUTAneous DAILY    diphenhydrAMINE (BENADRYL) capsule 50 mg  50 mg Oral Q4H PRN    polyethylene glycol (MIRALAX) packet 17 g  17 g Oral TID    cholecalciferol (VITAMIN D3) tablet 2,000 Units  2,000 Units Oral DAILY    docusate sodium (COLACE) capsule 100 mg  100 mg Oral DAILY AFTER BREAKFAST    gabapentin (NEURONTIN) capsule 400 mg  400 mg Oral BID    oxyCODONE-acetaminophen (PERCOCET 7.5) 7.5-325 mg per tablet 1 Tab  1 Tab Oral Q4H PRN    pravastatin (PRAVACHOL) tablet 40 mg  40 mg Oral QHS    senna-docusate (PERICOLACE) 8.6-50 mg per tablet 2 Tab  2 Tab Oral PCD    acetaminophen (TYLENOL) tablet 500 mg  500 mg Oral Q6H PRN    allopurinol (ZYLOPRIM) tablet 100 mg  100 mg Oral DAILY    folic acid (FOLVITE) tablet 1 mg  1 mg Oral DAILY    ondansetron (ZOFRAN) injection 4 mg  4 mg IntraVENous Q8H PRN    glucose chewable tablet 16 g  16 g Oral PRN    glucagon (GLUCAGEN) injection 1 mg  1 mg IntraMUSCular PRN    dextrose (D50W) injection syrg 12.5-25 g  25-50 mL IntraVENous PRN         Physical Exam:   GENERAL: alert, cooperative, no distress, appears stated age  LUNG: unlabored breathing  ABDOMEN: soft, non-tender. No masses,  no organomegaly  EXTREMITIES:  extremities normal, atraumatic, no cyanosis or edema  SKIN: no jaundice  : no CVA tenderness. LOULOU drain w minimal serous output.        Lab/Data Review:  BMP: Lab Results   Component Value Date/Time     (L) 05/12/2017 08:20 AM    K 3.6 05/12/2017 08:20 AM    CL 98 (L) 05/12/2017 08:20 AM    CO2 30 05/12/2017 08:20 AM    AGAP 7 05/12/2017 08:20 AM    GLU 89 05/12/2017 08:20 AM    BUN 21 (H) 05/12/2017 08:20 AM    CREA 2.08 (H) 05/12/2017 08:20 AM    GFRAA 29 (L) 05/12/2017 08:20 AM    GFRNA 24 (L) 05/12/2017 08:20 AM     CBC: Lab Results   Component Value Date/Time    WBC 13.3 (H) 05/12/2017 08:20 AM    HGB 8.0 (L) 05/12/2017 08:20 AM    HCT 24.6 (L) 05/12/2017 08:20 AM     05/12/2017 08:20 AM     COAGS: No results found for: APTT, PTP, INR       CT Results:    Results from Hospital Encounter encounter on 04/20/17   CT DRAIN ABS W CATH PERC   Narrative PREOPERATIVE DIAGNOSIS: Right psoas muscle abscess. POSTOPERATIVE DIAGNOSIS: Same    INDICATION: Residual right psoas muscle fluid collection. ATTENDING: JOANN Turner Officer: None. PROCEDURES:  1. New CT-guided drainage of a small right psoas muscle residual fluid  collection and drainage catheter placement. 2. Old right psoas muscle drainage catheter removal.    ANESTHESIA: Local 1% lidocaine as well as moderate intravenous sedation with  Versed and fentanyl given and monitored per independently trained interventional  radiology nurse under my direct supervision for 30 minutes. Please see detailed  nursing records for medication dosing. CONTRAST: None. COMPLICATIONS: None    DRAIN: No    CATHETER: None. EBL: None. SPECIMEN: None. TECHNIQUE:     All CT scans at this facility are performed using dose optimization technique as  appropriate to a performed exam, to include automated exposure control,  adjustment of the mA and/or kV according to patient size (including appropriate  matching for site-specific examinations), or use of iterative reconstruction  technique. The risks, benefits, and alternatives were discussed. Written and verbal consent  obtained. Patient was placed supine on CT table and the right lower quadrant  region was prepped and draped in usual sterile fashion. Maximum sterile barrier  technique was used. Timeout was performed. 1% lidocaine was then used. Preliminary CT imaging of the lower thoracic and lumbar spine was obtained. Under direct CT fluoroscopy guidance using 18-gauge trocar needle was advanced  down into collection. J-wire was advanced through the needle. Serial dilatations  were performed. 8.5 [employer] Magalys all-purpose drainage catheter was placed into  collection under direct CT fluoroscopy. Distal coil was formed. Catheter was  flushed with normal saline and attached to bulb suction. External tubing was  affixed to the skin with 0 proline. Sterile dressing was applied. Old right psoas muscle drainage catheter has been removed without difficulty and  discarded. Sterile dressing was applied. Patient tolerated procedure fairly well. There were no immediate complications. Patient was transferred to recovery in stable condition. Dr. Yasmany Alba was  present and performed the procedure. FINDINGS: Initial imaging demonstrated very minimal residual right psoas muscle  fluid collection extending inferiorly. Significant reduction of the size of the  right psoas muscle is demonstrated. Previously placed drainage catheter has been  minimally pulled back. Therefore old drainage catheter was removed given its small position. New drainage catheter was placed as described above. .    CT fluoroscopic guidance demonstrated good position of the access needle as well  as new drainage catheter. Impression IMPRESSION:      1. Successful, uncomplicated new CT-guided right psoas muscle fluid collection  drainage and drainage catheter placement. 2. Successful uncomplicated removal of the old right psoas muscle drainage  catheter. PLAN: Drainage catheter should be attached to bulb suction. Outputs should be  monitored Q shift. Catheter should be flushed in the antegrade fashion with 10  cc of sterile normal saline 3 times a day. US Results:    Results from [place] encounter on 04/20/17   US ABD LTD   Narrative ULTRASOUND ABDOMEN-LIMITED    INDICATION: Evaluation for fluid collection/abscess at the site of pacemaker  pocket    COMPARISON: Correlation with CT abdomen/pelvis 4/19/2017    TECHNIQUE: Ultrasound evaluation was performed of the left upper quadrant  abdomen in the area of pacemaker. Selected static images are submitted for  review.     FINDINGS:  In the area of concern, there is no evidence for focal fluid collection/abscess. Impression IMPRESSION:  As above.               Principal Problem:    Acute paraplegia ([address] (4/20/2017)    Active Problems:    Type 2 diabetes mellitus with diabetic neuropathy ([address] (6/28/2011)      AICD generator infection ([address] (8/20/2012)      Overview: S/p explant 4 leads 8/20/12      Benign hypertensive heart disease with systolic CHF, NYHA class 2 ([address] (9/5/2012)      Decreased calculated glomerular filtration rate (GFR) (3/30/2017)      Overview: Calculated GFR equivalent to that of CKD stage 3 = 30-59 ml/min      Iliopsoas muscle hematoma (3/30/2017)      Psoas hematoma, right, secondary to anticoagulant therapy (3/30/2017)      Impaired mobility and ADLs (3/30/2017)      History of Coumadin therapy ()      Overview: Anticoagulation for chronic atrial fibrillation; Discontinued on 3/30/2017      Sepsis ([address] (4/20/2017)      Psoas abscess, right ([address] (4/20/2017)      Krueger catheter in place on admission (4/20/2017)      Urinary tract infection due to Enterococcus (4/20/2017)      Group B streptococcal infection (4/20/2017)          Signed By: Hans Baer MD , FACS    May 12, 2017      PAGER:  [phone]  OFFICE:  [address] [place] [address]   [phone]

## 2017-05-13 ENCOUNTER — APPOINTMENT (OUTPATIENT)
Dept: GENERAL RADIOLOGY | Age: 67
DRG: 853 | End: 2017-05-13
Attending: HOSPITALIST
Payer: COMMERCIAL

## 2017-05-13 LAB
ANION GAP BLD CALC-SCNC: 8 MMOL/L (ref 3–18)
BASOPHILS # BLD AUTO: 0 K/UL (ref 0–0.06)
BASOPHILS # BLD: 0 % (ref 0–3)
BUN SERPL-MCNC: 24 MG/DL (ref 7–18)
BUN/CREAT SERPL: 9 (ref 12–20)
CALCIUM SERPL-MCNC: 8 MG/DL (ref 8.5–10.1)
CHLORIDE SERPL-SCNC: 100 MMOL/L (ref 100–108)
CO2 SERPL-SCNC: 27 MMOL/L (ref 21–32)
CREAT SERPL-MCNC: 2.68 MG/DL (ref 0.6–1.3)
DIFFERENTIAL METHOD BLD: ABNORMAL
EOSINOPHIL # BLD: 0.3 K/UL (ref 0–0.4)
EOSINOPHIL NFR BLD: 2 % (ref 0–5)
ERYTHROCYTE [DISTWIDTH] IN BLOOD BY AUTOMATED COUNT: 17.2 % (ref 11.6–14.5)
GLUCOSE BLD STRIP.AUTO-MCNC: 148 MG/DL (ref 70–110)
GLUCOSE BLD STRIP.AUTO-MCNC: 163 MG/DL (ref 70–110)
GLUCOSE BLD STRIP.AUTO-MCNC: 188 MG/DL (ref 70–110)
GLUCOSE BLD STRIP.AUTO-MCNC: 232 MG/DL (ref 70–110)
GLUCOSE SERPL-MCNC: 144 MG/DL (ref 74–99)
HCT VFR BLD AUTO: 22.7 % (ref 35–45)
HGB BLD-MCNC: 7.3 G/DL (ref 12–16)
LYMPHOCYTES # BLD AUTO: 15 % (ref 20–51)
LYMPHOCYTES # BLD: 1.9 K/UL (ref 0.8–3.5)
MCH RBC QN AUTO: 26.1 PG (ref 24–34)
MCHC RBC AUTO-ENTMCNC: 32.2 G/DL (ref 31–37)
MCV RBC AUTO: 81.1 FL (ref 74–97)
MONOCYTES # BLD: 0.9 K/UL (ref 0–1)
MONOCYTES NFR BLD AUTO: 7 % (ref 2–9)
NEUTS SEG # BLD: 9.7 K/UL (ref 1.8–8)
NEUTS SEG NFR BLD AUTO: 76 % (ref 42–75)
PLATELET # BLD AUTO: 215 K/UL (ref 135–420)
PLATELET COMMENTS,PCOM: ABNORMAL
PMV BLD AUTO: 8.8 FL (ref 9.2–11.8)
POTASSIUM SERPL-SCNC: 3.6 MMOL/L (ref 3.5–5.5)
RBC # BLD AUTO: 2.8 M/UL (ref 4.2–5.3)
RBC MORPH BLD: ABNORMAL
SODIUM SERPL-SCNC: 135 MMOL/L (ref 136–145)
WBC # BLD AUTO: 12.8 K/UL (ref 4.6–13.2)

## 2017-05-13 PROCEDURE — 74011250637 HC RX REV CODE- 250/637: Performed by: INTERNAL MEDICINE

## 2017-05-13 PROCEDURE — 80048 BASIC METABOLIC PNL TOTAL CA: CPT | Performed by: INTERNAL MEDICINE

## 2017-05-13 PROCEDURE — 74011250636 HC RX REV CODE- 250/636: Performed by: HOSPITALIST

## 2017-05-13 PROCEDURE — 85025 COMPLETE CBC W/AUTO DIFF WBC: CPT | Performed by: INTERNAL MEDICINE

## 2017-05-13 PROCEDURE — 74011000258 HC RX REV CODE- 258: Performed by: INTERNAL MEDICINE

## 2017-05-13 PROCEDURE — 65660000000 HC RM CCU STEPDOWN

## 2017-05-13 PROCEDURE — 74011250636 HC RX REV CODE- 250/636: Performed by: INTERNAL MEDICINE

## 2017-05-13 PROCEDURE — 82962 GLUCOSE BLOOD TEST: CPT

## 2017-05-13 PROCEDURE — 51798 US URINE CAPACITY MEASURE: CPT

## 2017-05-13 PROCEDURE — 74011250637 HC RX REV CODE- 250/637: Performed by: HOSPITALIST

## 2017-05-13 PROCEDURE — 74011636637 HC RX REV CODE- 636/637: Performed by: INTERNAL MEDICINE

## 2017-05-13 PROCEDURE — 74011000250 HC RX REV CODE- 250: Performed by: UROLOGY

## 2017-05-13 PROCEDURE — 94640 AIRWAY INHALATION TREATMENT: CPT

## 2017-05-13 PROCEDURE — 74011636637 HC RX REV CODE- 636/637: Performed by: HOSPITALIST

## 2017-05-13 PROCEDURE — 71010 XR CHEST PORT: CPT

## 2017-05-13 RX ORDER — SODIUM CHLORIDE 9 MG/ML
500 INJECTION, SOLUTION INTRAVENOUS ONCE
Status: COMPLETED | OUTPATIENT
Start: 2017-05-13 | End: 2017-05-13

## 2017-05-13 RX ORDER — FACIAL-BODY WIPES
10 EACH TOPICAL
Status: COMPLETED | OUTPATIENT
Start: 2017-05-13 | End: 2017-05-13

## 2017-05-13 RX ORDER — DICLOFENAC SODIUM 10 MG/G
2 GEL TOPICAL
Status: DISCONTINUED | OUTPATIENT
Start: 2017-05-13 | End: 2017-05-25 | Stop reason: HOSPADM

## 2017-05-13 RX ORDER — FUROSEMIDE 10 MG/ML
40 INJECTION INTRAMUSCULAR; INTRAVENOUS ONCE
Status: COMPLETED | OUTPATIENT
Start: 2017-05-14 | End: 2017-05-13

## 2017-05-13 RX ORDER — FUROSEMIDE 10 MG/ML
INJECTION INTRAMUSCULAR; INTRAVENOUS
Status: DISPENSED
Start: 2017-05-13 | End: 2017-05-14

## 2017-05-13 RX ADMIN — IPRATROPIUM BROMIDE AND ALBUTEROL SULFATE 3 ML: 2.5; .5 SOLUTION RESPIRATORY (INHALATION) at 21:18

## 2017-05-13 RX ADMIN — DOCUSATE SODIUM 100 MG: 100 CAPSULE, LIQUID FILLED ORAL at 09:17

## 2017-05-13 RX ADMIN — FUROSEMIDE 40 MG: 10 INJECTION, SOLUTION INTRAVENOUS at 23:12

## 2017-05-13 RX ADMIN — ALLOPURINOL: 100 TABLET ORAL at 09:18

## 2017-05-13 RX ADMIN — ACETAMINOPHEN 650 MG: 500 TABLET ORAL at 17:39

## 2017-05-13 RX ADMIN — INSULIN LISPRO 3 UNITS: 100 INJECTION, SOLUTION INTRAVENOUS; SUBCUTANEOUS at 09:49

## 2017-05-13 RX ADMIN — GABAPENTIN 200 MG: 100 CAPSULE ORAL at 17:28

## 2017-05-13 RX ADMIN — FOLIC ACID 1 MG: 1 TABLET ORAL at 09:16

## 2017-05-13 RX ADMIN — SODIUM CHLORIDE 500 ML/HR: 900 INJECTION, SOLUTION INTRAVENOUS at 00:54

## 2017-05-13 RX ADMIN — INSULIN LISPRO 3 UNITS: 100 INJECTION, SOLUTION INTRAVENOUS; SUBCUTANEOUS at 17:40

## 2017-05-13 RX ADMIN — OXYCODONE HYDROCHLORIDE AND ACETAMINOPHEN 1 TABLET: 7.5; 325 TABLET ORAL at 16:10

## 2017-05-13 RX ADMIN — POLYETHYLENE GLYCOL 3350 17 G: 17 POWDER, FOR SOLUTION ORAL at 09:16

## 2017-05-13 RX ADMIN — CHOLECALCIFEROL TAB 25 MCG (1000 UNIT) 2000 UNITS: 25 TAB at 09:19

## 2017-05-13 RX ADMIN — Medication 10 ML: at 15:50

## 2017-05-13 RX ADMIN — Medication 2 TABLET: at 17:28

## 2017-05-13 RX ADMIN — INSULIN GLARGINE 20 UNITS: 100 INJECTION, SOLUTION SUBCUTANEOUS at 09:56

## 2017-05-13 RX ADMIN — GABAPENTIN 200 MG: 100 CAPSULE ORAL at 09:16

## 2017-05-13 RX ADMIN — BISACODYL 10 MG: 10 SUPPOSITORY RECTAL at 17:28

## 2017-05-13 RX ADMIN — INSULIN LISPRO 6 UNITS: 100 INJECTION, SOLUTION INTRAVENOUS; SUBCUTANEOUS at 12:16

## 2017-05-13 RX ADMIN — ONDANSETRON 4 MG: 2 INJECTION INTRAMUSCULAR; INTRAVENOUS at 09:17

## 2017-05-13 RX ADMIN — POLYETHYLENE GLYCOL 3350 17 G: 17 POWDER, FOR SOLUTION ORAL at 15:48

## 2017-05-13 RX ADMIN — LACTOBACILLUS TAB 1 TABLET: TAB at 17:28

## 2017-05-13 RX ADMIN — LACTOBACILLUS TAB 1 TABLET: TAB at 09:16

## 2017-05-13 RX ADMIN — Medication 10 ML: at 09:55

## 2017-05-13 RX ADMIN — FAMOTIDINE 20 MG: 20 TABLET ORAL at 09:16

## 2017-05-13 RX ADMIN — ACETAMINOPHEN 500 MG: 500 TABLET, COATED ORAL at 05:37

## 2017-05-13 RX ADMIN — CEFTRIAXONE SODIUM 2 G: 2 INJECTION, POWDER, FOR SOLUTION INTRAMUSCULAR; INTRAVENOUS at 11:50

## 2017-05-13 NOTE — PROGRESS NOTES
RENAL DAILY PROGRESS NOTE    Patient: Alexia Hassan               Sex: female          DOA: 4/20/2017  3:36 PM        YOB: 1950      Age:  77 y.o.        LOS:  LOS: 23 days     Subjective:     Alexia Hassan is a 77 y.o.  who presents with Acute paraplegia  Acute paraplegia ([facility] [address] Asked to evaluate for acute/crf stage 3.admitted with paraplegias,psoas muscle hematoma,s/p drain placement. hx of dm,gout  Chief complains: Patient denies nausea, vomiting, chest pain, dizziness, shortness of breath or headache.  - Reviewed last 24 hrs events     Current Facility-Administered Medications   Medication Dose Route Frequency    allopurinol (ZYLOPRIM) tablet 50 mg  50 mg Oral DAILY    gabapentin (NEURONTIN) capsule 200 mg  200 mg Oral BID    aluminum-magnesium hydroxide (MAALOX) oral suspension 15 mL  15 mL Oral QID PRN    albuterol-ipratropium (DUO-NEB) 2.5 MG-0.5 MG/3 ML  3 mL Nebulization Q4HWA RT    mirtazapine (REMERON SOL-TAB) disintegrating tablet 15 mg  15 mg Oral QHS    Lactobacillus Acidoph & Bulgar (FLORANEX) tablet 1 Tab  1 Tab Oral BID    sodium chloride (NS) flush 5-10 mL  5-10 mL IntraVENous Q8H    naloxone (NARCAN) injection 0.1 mg  0.1 mg IntraVENous Multiple    acetaminophen (TYLENOL) tablet 650 mg  650 mg Oral Q4H PRN    0.9% sodium chloride infusion  75 mL/hr IntraVENous CONTINUOUS    famotidine (PEPCID) tablet 20 mg  20 mg Oral DAILY    insulin lispro (HUMALOG) injection   SubCUTAneous AC&HS    0.9% sodium chloride infusion 250 mL  250 mL IntraVENous PRN    cefTRIAXone (ROCEPHIN) 2 g in 0.9% sodium chloride (MBP/ADV) 50 mL MBP  2 g IntraVENous Q24H    insulin glargine (LANTUS) injection 20 Units  20 Units SubCUTAneous DAILY    diphenhydrAMINE (BENADRYL) capsule 50 mg  50 mg Oral Q4H PRN    polyethylene glycol (MIRALAX) packet 17 g  17 g Oral TID    cholecalciferol (VITAMIN D3) tablet 2,000 Units  2,000 Units Oral DAILY    docusate sodium (COLACE) capsule 100 mg  100 mg Oral DAILY AFTER BREAKFAST    oxyCODONE-acetaminophen (PERCOCET 7.5) 7.5-325 mg per tablet 1 Tab  1 Tab Oral Q4H PRN    pravastatin (PRAVACHOL) tablet 40 mg  40 mg Oral QHS    senna-docusate (PERICOLACE) 8.6-50 mg per tablet 2 Tab  2 Tab Oral PCD    acetaminophen (TYLENOL) tablet 500 mg  500 mg Oral U3D PRN    folic acid (FOLVITE) tablet 1 mg  1 mg Oral DAILY    ondansetron (ZOFRAN) injection 4 mg  4 mg IntraVENous Q8H PRN    glucose chewable tablet 16 g  16 g Oral PRN    glucagon (GLUCAGEN) injection 1 mg  1 mg IntraMUSCular PRN    dextrose (D50W) injection syrg 12.5-25 g  25-50 mL IntraVENous PRN       Objective:     Visit Vitals    BP (!) 87/48 (BP 1 Location: Left arm, BP Patient Position: At rest)    Pulse 95    Temp 98.5 °F (36.9 °C)    Resp 20    Ht 5' 7\" (1.702 m)    Wt 117.8 kg (259 lb 11.2 oz)    SpO2 97%    Breastfeeding No    BMI 40.68 kg/m2       Intake/Output Summary (Last 24 hours) at 05/13/17 1137  Last data filed at 05/13/17 0447   Gross per 24 hour   Intake              740 ml   Output              710 ml   Net               30 ml       Physical Examination:     GEN: AAO X 3, NAD  RS: Chest is bilateral equal, no wheezing / rales / crackles  CVS: S1-S2 heard, RRR, No S3 / murmur  Abdomen: Soft, Non tender, Not distended, Positive bowel sounds, no organomegaly, no CVA / supra pubic tenderness  Extremities: + edema, no cyanosis, skin is warm on touch  CNS: Awake & follows commands, CN II-XII are grossly intact. HEENT: Head is atraumatic, PERRLA, conjunctiva pink & non icteric. No JVD or carotid bruit   Psychiatric: Normal mood, affect, judgement & memory    Musculoskeletal: No gross joints or bone deformities   Lymph Node: No palpable cervical, axillary or groin lymphadenopathy.       Data Review:      Labs:     Hematology:   Recent Labs      05/13/17   0545  05/12/17   0820  05/10/17   2335   WBC  12.8  13.3*  11.8   HGB  7.3*  8.0*  9.2*   HCT  22.7*  24.6*  28.1* Chemistry:   Recent Labs      05/13/17   0545  05/12/17   0820  05/10/17   2335   BUN  24*  21*  20*   CREA  2.68*  2.08*  1.79*   CA  8.0*  8.3*  8.2*   K  3.6  3.6  3.9   NA  135*  135*  135*   CL  100  98*  100   CO2  27  30  26   GLU  144*  89  130*        Images:    XR (Most Recent). CXR reviewed by me and compared with previous CXR   Results from Hospital Encounter encounter on 04/20/17   XR ABD (KUB)   Narrative Description:  Abdominal radiograph, single view    Clinical Indication:  Abdominal pain. History of CT-guided drainage placement  adjacent to the right psoas fluid collection. Comparison: October 8, 2012. Correlation with CT guided procedure, May 10, 2017. Findings: The distal portion of a drainage catheter is seen within the right abdomen. The  bowel gas pattern is nonobstructed. IVC filter noted. Impression Impression:      No evidence of bowel obstruction. No etiology for patient's clinical symptoms. CT (Most Recent)   Results from Hospital Encounter encounter on 04/20/17   CT DRAIN ABS W CATH PERC   Narrative PREOPERATIVE DIAGNOSIS: Right psoas muscle abscess. POSTOPERATIVE DIAGNOSIS: Same    INDICATION: Residual right psoas muscle fluid collection. ATTENDING: Dr. Godwin Basurto M.D.    Kenzie Champion: None. PROCEDURES:  1. New CT-guided drainage of a small right psoas muscle residual fluid  collection and drainage catheter placement. 2. Old right psoas muscle drainage catheter removal.    ANESTHESIA: Local 1% lidocaine as well as moderate intravenous sedation with  Versed and fentanyl given and monitored per independently trained interventional  radiology nurse under my direct supervision for 30 minutes. Please see detailed  nursing records for medication dosing. CONTRAST: None. COMPLICATIONS: None    DRAIN: No    CATHETER: None. EBL: None. SPECIMEN: None.     TECHNIQUE:     All CT scans at this facility are performed using dose optimization technique as  appropriate to a performed exam, to include automated exposure control,  adjustment of the mA and/or kV according to patient size (including appropriate  matching for site-specific examinations), or use of iterative reconstruction  technique. The risks, benefits, and alternatives were discussed. Written and verbal consent  obtained. Patient was placed supine on CT table and the right lower quadrant  region was prepped and draped in usual sterile fashion. Maximum sterile barrier  technique was used. Timeout was performed. 1% lidocaine was then used. Preliminary CT imaging of the lower thoracic and lumbar spine was obtained. Under direct CT fluoroscopy guidance using 18-gauge trocar needle was advanced  down into collection. J-wire was advanced through the needle. Serial dilatations  were performed. 8.5 Western Magalys all-purpose drainage catheter was placed into  collection under direct CT fluoroscopy. Distal coil was formed. Catheter was  flushed with normal saline and attached to bulb suction. External tubing was  affixed to the skin with 0 proline. Sterile dressing was applied. Old right psoas muscle drainage catheter has been removed without difficulty and  discarded. Sterile dressing was applied. Patient tolerated procedure fairly well. There were no immediate complications. Patient was transferred to recovery in stable condition. Dr. Eliza Salgado was  present and performed the procedure. FINDINGS: Initial imaging demonstrated very minimal residual right psoas muscle  fluid collection extending inferiorly. Significant reduction of the size of the  right psoas muscle is demonstrated. Previously placed drainage catheter has been  minimally pulled back. Therefore old drainage catheter was removed given its small position. New drainage catheter was placed as described above. .    CT fluoroscopic guidance demonstrated good position of the access needle as well  as new drainage catheter. Impression IMPRESSION:      1. Successful, uncomplicated new CT-guided right psoas muscle fluid collection  drainage and drainage catheter placement. 2. Successful uncomplicated removal of the old right psoas muscle drainage  catheter. PLAN: Drainage catheter should be attached to bulb suction. Outputs should be  monitored Q shift. Catheter should be flushed in the antegrade fashion with 10  cc of sterile normal saline 3 times a day. EKG Results for orders placed or performed in visit on 11/01/16   AMB POC EKG ROUTINE W/ 12 LEADS, INTER & REP     Status: None    Narrative    AV sequentially paced rhythm with a rate of 67. I have personally reviewed the old medical records and patient's labs    Plan / Recommendation:      1. Acute/crf stage 3.neurogenic bladder. poor record of intake and output. discussed with her nurse. talked with  Daughter. worsening,bladder scan nl.? Gentamicin nephrotoxicity. had stopped gentamicin. continue iv hydration     2.anemia,psoas muscle hematoma,s/p drain placement,worsening  3.uti,fever,on antibiotics  4.adjusted meds for renal failure    D/w Dr. Ruby Pratt MD  Nephrology  5/13/2017

## 2017-05-13 NOTE — PROGRESS NOTES
[facility]  Pulmonary, Critical Care, and Sleep Medicine    Name: Fernando Arnold MRN: [id number]   : 1950 Hospital: [facility]   Date: 2017          Subjective/Interval History:     Patient is a 77 y.o. female w/hx of HTN, CHF, heart block s/p pacer, multiple vascular procedures in neck veins with residual stenoses, prior Hx of DVT, DM, Afib, recent admission for Rt psoas hematoma. Was readmitted on 17 for acute paraplegia, fevers, sweats. Repeat CT demonstrated increased size of psoas fluid collection. Labs notable for leukocytosis and LISBETH. Pt brought to IR and 300ml of pus aspirated. Hypotensive upon return to floor, and still so after ~1500 ml IVF. Pt was transferred to ICU on 17 for hypotension s/p procedure; recovered with pressor support and fluids. Pt was then transferred out of ICU floor. Last seen by Pulmonary /ICU - 17      Pulmonary was re-consulted on 17 for pt d/t SOB x2-3 days without clear cause. Pt states that her chest and abd \"hurt\", which is causing her to have SOB. Pt states the pain and SOB is worse while lying flat and taking deep breaths, improves with sitting up. Per pt and RN, pt has not eaten much the last couple of days - pain and SOB thought to possibly relate to GERD/acid reflux. Maalox was ordered for pt today to help with abd pain. In addition, workup of this SOB has included cardiac enzymes (3 sets, all negative); VQ scan - low probability for PE; bilateral venous dopplers, which shows (+) DVT in R Common Femoral  Vein and (+) DVT in L popliteal vein. Pt then underwent emergent IVC placement d/t inability to anticoagulate using heparin 2/2 hx Psoas muscle hematoma.       17:  - Pt much more alert, awake, interactive today; family at bedside; pt about to have lunch; resting comfortably on 2 LPM NC; NAD  - States overall, she feels better than yesterday; appears overall better than yesterday  - CP & ABD pain has much improved; still has slightly R sided chest pain, but states that it feels much better and is occurring less often  - Still c/o some SOB, but appears more relaxed, less anxious, and appears no obvious distress. - Single episode of fever overnight; episode of hypotension overnight; BP is stable now, afebrile currently. - Denies any substernal CP, palpitations, abd pain, epigastric pain, N/V/D; F/C; H/A      ROS:Pertinent items are noted in HPI. Objective:   Vital Signs:    Visit Vitals    /65 (BP 1 Location: Left arm, BP Patient Position: At rest)    Pulse 96    Temp 98.8 °F (37.1 °C)    Resp 22    Ht 5' 7\" (1.702 m)    Wt 117.8 kg (259 lb 11.2 oz)    SpO2 96%    Breastfeeding No    BMI 40.68 kg/m2       O2 Device: Nasal cannula   O2 Flow Rate (L/min): 2 l/min   Temp (24hrs), Av.7 °F (37.6 °C), Min:98.5 °F (36.9 °C), Max:100.8 °F (38.2 °C)       Intake/Output:   Last shift:         Last 3 shifts:  1901 -  0700  In: 740 [P.O.:240; I.V.:500]  Out: 710 [Urine:700; Drains:10]    Intake/Output Summary (Last 24 hours) at 17 1241  Last data filed at 17 0447   Gross per 24 hour   Intake              740 ml   Output              710 ml   Net               30 ml        Physical Exam:    General: Alert, awake, resting comfortably on 2LPM NC, NAD   HEENT: Normal, PERRLA, EOMI, fundi benign   Neck: No abnormally enlarged lymph nodes. Resp: Symmetrical chest rise; mod AE bilat; diminished bibasilar - sounds slightly improved from previous; CTAB; no wheezes/rhonchi/rales noted. CV: RRR, S1S2 normal, no m/r/g; no chest wall tenderness   Abdomen: Abdomen is soft; (+) BS x4;  No significant tenderness, masses, organomegaly or guarding   Extremity: 1+ edema; No cyanosis/ clubbing; peripheral pulses 2+ and symmetric   Neuro: Much more alert today; grossly non-focal   Skin: Skin color, texture, turgor normal. No rashes or lesions.  PICC L upper arm        DATA:  Labs:  Recent Labs 05/13/17   0545  05/12/17   0820  05/10/17   2335   WBC  12.8  13.3*  11.8   HGB  7.3*  8.0*  9.2*   HCT  22.7*  24.6*  28.1*   PLT  215  203  202     Recent Labs      05/13/17   0545  05/12/17   0820  05/10/17   2335   NA  135*  135*  135*   K  3.6  3.6  3.9   CL  100  98*  100   CO2  27  30  26   GLU  144*  89  130*   BUN  24*  21*  20*   CREA  2.68*  2.08*  1.79*   CA  8.0*  8.3*  8.2*   MG   --   1.9  1.7       PFT:    N/A                                                   Echo [04/21/17]:  SUMMARY:  Left ventricle: Size was at the upper limits of normal. Systolic function  was mildly reduced by visual assessment. Ejection fraction was estimated  to be 45 %. There was severe hypokinesis of the basal-mid inferoseptal,  apical septal, and apical wall(s). Structure noted in the apex as  mentioned on previous studies. Possible muscle band vs fibrinous mass. Imaging: No new imaging today   CXR [05/12/17]: FINDINGS:     Right lung is moderate to markedly hypoventilated. Left lung is mildly  hypoventilated.     There are findings of previous cardiothoracic surgery, presumably previous CABG. Pulmonary vascularity is no cephalized and in left lung. In the medial basal left lung there are probable mild atelectatic changes. Rest of left lung appears to be clear.     In right lower lung field there are persistent moderate opacities, indicating  atelectatic changes, and/or infiltrates with probable right basilar pleural effusion, similar to previous study.     Right brachial PICC line extends to the lateral portion of right subclavian vein. IMPRESSION:  Previously demonstrated pulmonary vascular congestion is resolved. There is no pulmonary edema.     Moderate dense pneumonia, and/or atelectatic changes in right lower lung with suspected right basilar pleural effusion.     Probable mild atelectasis in the left medial lung base. But, otherwise left lung  is clear at this time.      XR ABD (KUB) [05/12/17]: FINDINGS:  The distal portion of a drainage catheter is seen within the right abdomen. The bowel gas pattern is nonobstructed. IVC filter noted.     IMPRESSION:      No evidence of bowel obstruction. No etiology for patient's clinical symptoms    NM LUNG PERFUSION W VENT [05/11/17]: There is a matched defect at the bilateral lower lobes, more prominent on  ventilatory scan. This corresponds with the opacity seen on same day chest  x-ray. No evidence for moderate to large segmental mismatched perfusion defect.     IMPRESSION:  Low probability VQ scan for pulmonary embolism. [x]I have personally reviewed the patients radiographs  [x]Radiographs reviewed with radiologist   []No change from prior, tubes and lines in adequate position  []Improved   []Worsening    IMPRESSION:   · (+) DVT -  R Common Femoral  Vein and L popliteal vein; s/p IVC filter. V/Q scan on 5/11/17: low probability. Small PE is possible but doubtful currently and given current clinical status not a candidate for systemic anticoagulation. · Hypoxia d/t atelectasis CXR shows resolution of previously demonstrated pulmonary vascular with probable mild atelectasis in the left medial lung base. Can exclude small PE but V/Q scan from 5/11/17 with low prob, patient has IVC filter and is not a candidate for systemic anticoagulation at this time  · KUB shows no acute process.-Atypical Chest pain- doubt cardiac or pulmonary etiology. ?? Diaphragmatic irritation from abdominally placed pacemaker? ? Vs GI/ esophageal etiology.   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  · Enterococcus faecalis in urine (gent susceptible)  · Bacteremia   · Infected pacer pocket  · Psoas hematoma  · LISBETH - D/t neurogenic bladder and component of ATN  · NSTEMI   · Hx of CHF and Afib  · Prior complicated pacer placement (apparently upper extremity and central vein stenoses)  · Inability to obtain MRI (pacer)     Patient Active Problem List   Diagnosis Code    Nonischemic cardiomyopathy ([facility] I42.8    History of complete heart block Z86.79    Biventricular implantable cardioverter-defibrillator in situ Z95.810    Left bundle branch block (LBBB) on electrocardiogram I44.7    Type 2 diabetes mellitus with diabetic neuropathy (HCC) E11.40    Dyslipidemia E78.5    Diabetic neuropathy associated with type 2 diabetes mellitus ([facility] E11.40    Obstructive sleep apnea on CPAP G47.33, Z99.89    AICD generator infection ([facility] T82. 7XXA    Difficult airway for intubation T88. 4XXA    Benign hypertensive heart disease with systolic CHF, NYHA class 2 (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) I50.22    Obesity (BMI 35.0-39.9 without comorbidity) ([facility] E66.9    History of pyelonephritis Z87.440    Iliopsoas muscle hematoma S70.10XA    Psoas hematoma, right, secondary to anticoagulant therapy S30. 1XXA    Impaired mobility and ADLs Z74.09    History of Coumadin therapy Z79.01    Gout M10.9    Acute paraplegia (HCC) G82.20    Sepsis ([facility] A41.9    Psoas abscess, right ([facility] K68.12    Krueger catheter in place on admission Z96.0    Urinary tract infection due to Enterococcus N39.0, B95.2    Group B streptococcal infection A49.1      PLAN:   · SpO2 >92%; titrate supp O2 PRN; pt currently sitting up in bed, about to eat her lunch, resting comfortably on 2LPM NC with family at bedside    · Con't Aspiration precautions - HOB elevated  ·   · Cont' Aggressive pulmonary toilet; initiate bronchial hygiene protocol - encourage use of ICS  ·   · Con't with EZPAP device for breathing treatments - con't duo-nebs q4 while awake for now;     · Agree with Maalox; con't pepcid and bowel regimen; would consider adding Reglan for additional abdominal relief as pt states she feels that she has \"gas bubbles\" when she eats. ·   · Will follow peripherally and be available for questions. Thank you for this referral.        Neo Gonzales PA-C    Pulmonary Staff  I have independently evaluated the patient and discussed findings and care plan with SRAVANTHI. Dyspnea and chest discomfort continues to wax and wane. At the time of my evaluation the patient has just taken pain medication but was resting comfortably. Agree with above assessment and plan of care. Will continue to maximize pulmonary function as best possible. No new recommendations at this time. Clinical care and time spend coordinating care 30min    . Josh Hackett, DO  Pulmonary / Critical Care

## 2017-05-13 NOTE — ROUTINE PROCESS
Bedside and Verbal shift change report given to charlee hart (oncoming nurse) by Som Davies RN (offgoing nurse). Report given with SBAR, Kardex, Intake/Output, MAR, Accordion and Recent Results.

## 2017-05-13 NOTE — PROGRESS NOTES
Mews score:  5/12/2017     2000-- mews of 3-- elevated temp and heart rate -- gave     5/13/2017      0000-- mews of 4 low blood pressure called Dr. Arron Castanon ordered and increase maintenance fluid     5/13/2017       0400-- mews of 3 blood pressure a little low      BLADDER SCAN NOTE:    15 ML FOUND IN BLADDER

## 2017-05-13 NOTE — PROGRESS NOTES
SUBJECTIVE:    Feeling okay. No SOB. C/o intermittent Left shoulder pain. No nausea or vomiting. No headaches or dizziness. No Abdominal pain +. no N/V currently. OBJECTIVE:    Visit Vitals    /51 (BP 1 Location: Left arm, BP Patient Position: Sitting)    Pulse (!) 119    Temp 99.8 °F (37.7 °C)    Resp 24    Ht 5' 7\" (1.702 m)    Wt 117.8 kg (259 lb 11.2 oz)    SpO2 93%    Breastfeeding No    BMI 40.68 kg/m2     RS: diminished bilaterally anteriorly but good air entry posteriorly   CVS: RRR  GI: ND, BS +, drain +. NT   Extremities: trace pedal edema  CNS: motor strength 0-1/5 in both LEs. 5/5 un UEs  General: NAD, AWAKE, Follows commands    ASSESSMENT:    1. Sepsis secondary to infected right psoas hematoma/epidural abscess with neurological compromise resulting in paraplegia. S/p CT guided right new psoas muscle drainage catheter placement and old drain removal.  2. 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 3. h/o intermittent pain at the site of abdominal pacemaker  4. Heart block s/p AICD 2005 with upgrade to BiV later that year, removed however in 2012 due to trauma and infection. New biventricular pacemaker LUQ 9/2012 by Dr. Tiffany Guillory and revision 10/2012 due to Twiddler's syndrome. 5. Elevated troponin likely demand ischemia in setting of sepsis. 6. H/p transient nonischemic CMY with EF 45%  7. Acute kidney injury due to neurogenic bladder and Component of ATN. 8. Acute on chronic anemia s/p 2 units  9. Diabetes mellitus. 10.Dyslipidemia. 6. H/o HTN. 12. LBBB. 13. HALI on CPAP. 14. Left shoulder pain  15. Atypical Chest pain   16. Hypoxia due to atelectasis  17.  Bilateral LE DVT s/p IVC    PLAN:    Cont antibiotic per ID  Cont current management including perez catheter  Xray shoulder report reviewed  Off gentamycin and vancomycin    --> TOTAL TIME GREATER THAN 30 MINUTES    CMP:   Lab Results   Component Value Date/Time     (L) 05/13/2017 05:45 AM    K 3.6 05/13/2017 05:45 AM     05/13/2017 05:45 AM    CO2 27 05/13/2017 05:45 AM    AGAP 8 05/13/2017 05:45 AM     (H) 05/13/2017 05:45 AM    BUN 24 (H) 05/13/2017 05:45 AM    CREA 2.68 (H) 05/13/2017 05:45 AM    GFRAA 22 (L) 05/13/2017 05:45 AM    GFRNA 18 (L) 05/13/2017 05:45 AM    CA 8.0 (L) 05/13/2017 05:45 AM        CBC:   Lab Results   Component Value Date/Time    WBC 12.8 05/13/2017 05:45 AM    HGB 7.3 (L) 05/13/2017 05:45 AM    HCT 22.7 (L) 05/13/2017 05:45 AM     05/13/2017 05:45 AM

## 2017-05-14 ENCOUNTER — APPOINTMENT (OUTPATIENT)
Dept: GENERAL RADIOLOGY | Age: 67
DRG: 853 | End: 2017-05-14
Attending: NURSE PRACTITIONER
Payer: COMMERCIAL

## 2017-05-14 LAB
ALBUMIN SERPL BCP-MCNC: 1.3 G/DL (ref 3.4–5)
ALBUMIN/GLOB SERPL: 0.3 {RATIO} (ref 0.8–1.7)
ALP SERPL-CCNC: 298 U/L (ref 45–117)
ALT SERPL-CCNC: 69 U/L (ref 13–56)
ANION GAP BLD CALC-SCNC: 9 MMOL/L (ref 3–18)
ARTERIAL PATENCY WRIST A: YES
AST SERPL W P-5'-P-CCNC: 83 U/L (ref 15–37)
BASE EXCESS BLD CALC-SCNC: 0 MMOL/L
BASOPHILS # BLD AUTO: 0 K/UL (ref 0–0.1)
BASOPHILS # BLD: 0 % (ref 0–2)
BDY SITE: ABNORMAL
BILIRUB SERPL-MCNC: 0.5 MG/DL (ref 0.2–1)
BODY TEMPERATURE: 37
BUN SERPL-MCNC: 31 MG/DL (ref 7–18)
BUN/CREAT SERPL: 10 (ref 12–20)
CALCIUM SERPL-MCNC: 8.2 MG/DL (ref 8.5–10.1)
CHLORIDE SERPL-SCNC: 100 MMOL/L (ref 100–108)
CO2 SERPL-SCNC: 26 MMOL/L (ref 21–32)
CREAT SERPL-MCNC: 3.26 MG/DL (ref 0.6–1.3)
DIFFERENTIAL METHOD BLD: ABNORMAL
EOSINOPHIL # BLD: 0.3 K/UL (ref 0–0.4)
EOSINOPHIL NFR BLD: 3 % (ref 0–5)
ERYTHROCYTE [DISTWIDTH] IN BLOOD BY AUTOMATED COUNT: 17.5 % (ref 11.6–14.5)
GAS FLOW.O2 O2 DELIVERY SYS: ABNORMAL L/MIN
GAS FLOW.O2 SETTING OXYMISER: 6 L/M
GLOBULIN SER CALC-MCNC: 4.6 G/DL (ref 2–4)
GLUCOSE BLD STRIP.AUTO-MCNC: 124 MG/DL (ref 70–110)
GLUCOSE BLD STRIP.AUTO-MCNC: 138 MG/DL (ref 70–110)
GLUCOSE BLD STRIP.AUTO-MCNC: 189 MG/DL (ref 70–110)
GLUCOSE SERPL-MCNC: 132 MG/DL (ref 74–99)
HCO3 BLD-SCNC: 24.5 MMOL/L (ref 22–26)
HCT VFR BLD AUTO: 23.2 % (ref 35–45)
HGB BLD-MCNC: 7.6 G/DL (ref 12–16)
LACTATE SERPL-SCNC: 1.2 MMOL/L (ref 0.4–2)
LYMPHOCYTES # BLD AUTO: 16 % (ref 21–52)
LYMPHOCYTES # BLD: 1.8 K/UL (ref 0.9–3.6)
MCH RBC QN AUTO: 26.7 PG (ref 24–34)
MCHC RBC AUTO-ENTMCNC: 32.8 G/DL (ref 31–37)
MCV RBC AUTO: 81.4 FL (ref 74–97)
MONOCYTES # BLD: 1 K/UL (ref 0.05–1.2)
MONOCYTES NFR BLD AUTO: 9 % (ref 3–10)
NEUTS SEG # BLD: 8.4 K/UL (ref 1.8–8)
NEUTS SEG NFR BLD AUTO: 72 % (ref 40–73)
PCO2 BLD: 39.5 MMHG (ref 35–45)
PH BLD: 7.4 [PH] (ref 7.35–7.45)
PLATELET # BLD AUTO: 237 K/UL (ref 135–420)
PLATELET COMMENTS,PCOM: ABNORMAL
PMV BLD AUTO: 8.5 FL (ref 9.2–11.8)
PO2 BLD: 74 MMHG (ref 80–100)
POTASSIUM SERPL-SCNC: 4 MMOL/L (ref 3.5–5.5)
PROT SERPL-MCNC: 5.9 G/DL (ref 6.4–8.2)
RBC # BLD AUTO: 2.85 M/UL (ref 4.2–5.3)
RBC MORPH BLD: ABNORMAL
SAO2 % BLD: 95 % (ref 92–97)
SERVICE CMNT-IMP: ABNORMAL
SODIUM SERPL-SCNC: 135 MMOL/L (ref 136–145)
SPECIMEN TYPE: ABNORMAL
TOTAL RESP. RATE, ITRR: 20
WBC # BLD AUTO: 11.5 K/UL (ref 4.6–13.2)

## 2017-05-14 PROCEDURE — 74011250636 HC RX REV CODE- 250/636: Performed by: INTERNAL MEDICINE

## 2017-05-14 PROCEDURE — 71010 XR CHEST PORT: CPT

## 2017-05-14 PROCEDURE — 74011250637 HC RX REV CODE- 250/637: Performed by: HOSPITALIST

## 2017-05-14 PROCEDURE — 74011250636 HC RX REV CODE- 250/636: Performed by: PHYSICIAN ASSISTANT

## 2017-05-14 PROCEDURE — 74011000250 HC RX REV CODE- 250: Performed by: PHYSICIAN ASSISTANT

## 2017-05-14 PROCEDURE — 36600 WITHDRAWAL OF ARTERIAL BLOOD: CPT

## 2017-05-14 PROCEDURE — 74011250637 HC RX REV CODE- 250/637: Performed by: INTERNAL MEDICINE

## 2017-05-14 PROCEDURE — 74011636637 HC RX REV CODE- 636/637: Performed by: HOSPITALIST

## 2017-05-14 PROCEDURE — 82962 GLUCOSE BLOOD TEST: CPT

## 2017-05-14 PROCEDURE — 82803 BLOOD GASES ANY COMBINATION: CPT

## 2017-05-14 PROCEDURE — 74011250636 HC RX REV CODE- 250/636: Performed by: HOSPITALIST

## 2017-05-14 PROCEDURE — 83605 ASSAY OF LACTIC ACID: CPT | Performed by: HOSPITALIST

## 2017-05-14 PROCEDURE — 74011636637 HC RX REV CODE- 636/637: Performed by: INTERNAL MEDICINE

## 2017-05-14 PROCEDURE — 74011000250 HC RX REV CODE- 250: Performed by: HOSPITALIST

## 2017-05-14 PROCEDURE — 80053 COMPREHEN METABOLIC PANEL: CPT | Performed by: INTERNAL MEDICINE

## 2017-05-14 PROCEDURE — A6209 FOAM DRSG <=16 SQ IN W/O BDR: HCPCS

## 2017-05-14 PROCEDURE — 87186 SC STD MICRODIL/AGAR DIL: CPT | Performed by: HOSPITALIST

## 2017-05-14 PROCEDURE — 36415 COLL VENOUS BLD VENIPUNCTURE: CPT | Performed by: HOSPITALIST

## 2017-05-14 PROCEDURE — 65610000006 HC RM INTENSIVE CARE

## 2017-05-14 PROCEDURE — 77010033678 HC OXYGEN DAILY

## 2017-05-14 PROCEDURE — 87040 BLOOD CULTURE FOR BACTERIA: CPT | Performed by: HOSPITALIST

## 2017-05-14 PROCEDURE — 74011000258 HC RX REV CODE- 258: Performed by: INTERNAL MEDICINE

## 2017-05-14 PROCEDURE — 74011000250 HC RX REV CODE- 250: Performed by: UROLOGY

## 2017-05-14 PROCEDURE — 94640 AIRWAY INHALATION TREATMENT: CPT

## 2017-05-14 PROCEDURE — 87077 CULTURE AEROBIC IDENTIFY: CPT | Performed by: HOSPITALIST

## 2017-05-14 PROCEDURE — 85025 COMPLETE CBC W/AUTO DIFF WBC: CPT | Performed by: INTERNAL MEDICINE

## 2017-05-14 RX ORDER — SODIUM CHLORIDE 9 MG/ML
1000 INJECTION, SOLUTION INTRAVENOUS ONCE
Status: COMPLETED | OUTPATIENT
Start: 2017-05-14 | End: 2017-05-14

## 2017-05-14 RX ORDER — IPRATROPIUM BROMIDE AND ALBUTEROL SULFATE 2.5; .5 MG/3ML; MG/3ML
3 SOLUTION RESPIRATORY (INHALATION)
Status: COMPLETED | OUTPATIENT
Start: 2017-05-14 | End: 2017-05-14

## 2017-05-14 RX ORDER — LINEZOLID 2 MG/ML
600 INJECTION, SOLUTION INTRAVENOUS EVERY 12 HOURS
Status: DISCONTINUED | OUTPATIENT
Start: 2017-05-14 | End: 2017-05-17

## 2017-05-14 RX ORDER — ACETAMINOPHEN 650 MG/1
650 SUPPOSITORY RECTAL
Status: DISCONTINUED | OUTPATIENT
Start: 2017-05-14 | End: 2017-05-14

## 2017-05-14 RX ORDER — ACETAMINOPHEN 650 MG/1
650 SUPPOSITORY RECTAL
Status: DISCONTINUED | OUTPATIENT
Start: 2017-05-14 | End: 2017-05-25 | Stop reason: HOSPADM

## 2017-05-14 RX ORDER — ACETAMINOPHEN 500 MG
500 TABLET ORAL
Status: DISCONTINUED | OUTPATIENT
Start: 2017-05-14 | End: 2017-05-23

## 2017-05-14 RX ORDER — GABAPENTIN 100 MG/1
200 CAPSULE ORAL DAILY
Status: DISCONTINUED | OUTPATIENT
Start: 2017-05-15 | End: 2017-05-15

## 2017-05-14 RX ADMIN — IPRATROPIUM BROMIDE AND ALBUTEROL SULFATE 3 ML: 2.5; .5 SOLUTION RESPIRATORY (INHALATION) at 19:48

## 2017-05-14 RX ADMIN — DOCUSATE SODIUM 100 MG: 100 CAPSULE, LIQUID FILLED ORAL at 09:49

## 2017-05-14 RX ADMIN — MIRTAZAPINE 15 MG: 15 TABLET, ORALLY DISINTEGRATING ORAL at 22:23

## 2017-05-14 RX ADMIN — Medication 10 ML: at 05:53

## 2017-05-14 RX ADMIN — FAMOTIDINE 20 MG: 20 TABLET ORAL at 09:49

## 2017-05-14 RX ADMIN — INSULIN GLARGINE 20 UNITS: 100 INJECTION, SOLUTION SUBCUTANEOUS at 09:51

## 2017-05-14 RX ADMIN — Medication 10 ML: at 16:00

## 2017-05-14 RX ADMIN — IPRATROPIUM BROMIDE AND ALBUTEROL SULFATE 3 ML: .5; 3 SOLUTION RESPIRATORY (INHALATION) at 00:08

## 2017-05-14 RX ADMIN — ALTEPLASE 2 MG: 2.2 INJECTION, POWDER, LYOPHILIZED, FOR SOLUTION INTRAVENOUS at 23:58

## 2017-05-14 RX ADMIN — LINEZOLID 600 MG: 600 INJECTION, SOLUTION INTRAVENOUS at 17:39

## 2017-05-14 RX ADMIN — CEFTRIAXONE SODIUM 2 G: 2 INJECTION, POWDER, FOR SOLUTION INTRAMUSCULAR; INTRAVENOUS at 12:04

## 2017-05-14 RX ADMIN — ACETAMINOPHEN 650 MG: 650 SUPPOSITORY RECTAL at 01:53

## 2017-05-14 RX ADMIN — SODIUM CHLORIDE 1000 ML: 900 INJECTION, SOLUTION INTRAVENOUS at 17:06

## 2017-05-14 RX ADMIN — IPRATROPIUM BROMIDE AND ALBUTEROL SULFATE 3 ML: 2.5; .5 SOLUTION RESPIRATORY (INHALATION) at 11:58

## 2017-05-14 RX ADMIN — OXYCODONE HYDROCHLORIDE AND ACETAMINOPHEN 1 TABLET: 7.5; 325 TABLET ORAL at 12:18

## 2017-05-14 RX ADMIN — FOLIC ACID 1 MG: 1 TABLET ORAL at 09:49

## 2017-05-14 RX ADMIN — LACTOBACILLUS TAB 1 TABLET: TAB at 09:49

## 2017-05-14 RX ADMIN — SODIUM CHLORIDE 1000 ML: 900 INJECTION, SOLUTION INTRAVENOUS at 15:11

## 2017-05-14 RX ADMIN — ALLOPURINOL 50 MG: 100 TABLET ORAL at 09:50

## 2017-05-14 RX ADMIN — POLYETHYLENE GLYCOL 3350 17 G: 17 POWDER, FOR SOLUTION ORAL at 09:48

## 2017-05-14 RX ADMIN — IPRATROPIUM BROMIDE AND ALBUTEROL SULFATE 3 ML: 2.5; .5 SOLUTION RESPIRATORY (INHALATION) at 16:39

## 2017-05-14 RX ADMIN — GABAPENTIN 200 MG: 100 CAPSULE ORAL at 09:48

## 2017-05-14 RX ADMIN — LINEZOLID 600 MG: 600 INJECTION, SOLUTION INTRAVENOUS at 05:48

## 2017-05-14 RX ADMIN — CHOLECALCIFEROL TAB 25 MCG (1000 UNIT) 2000 UNITS: 25 TAB at 09:50

## 2017-05-14 RX ADMIN — Medication 10 ML: at 22:26

## 2017-05-14 RX ADMIN — INSULIN LISPRO 3 UNITS: 100 INJECTION, SOLUTION INTRAVENOUS; SUBCUTANEOUS at 12:05

## 2017-05-14 RX ADMIN — PRAVASTATIN SODIUM 40 MG: 20 TABLET ORAL at 22:23

## 2017-05-14 NOTE — PROGRESS NOTES
SUBJECTIVE:    Was sleepy but able to answer questions.  is at bedside. Off and on abdominal pain. No chest pain. SOB present but unable to cough. No N/V. Has been having low blood pressure even with IV fluid. OBJECTIVE:    Visit Vitals    BP (!) 87/54 (BP 1 Location: Left arm, BP Patient Position: At rest)    Pulse 83    Temp 99 °F (37.2 °C)    Resp 18    Ht 5' 7\" (1.702 m)    Wt 119.6 kg (263 lb 10.7 oz)    SpO2 96%    Breastfeeding No    BMI 41.3 kg/m2     RS: diminished bilaterally anteriorly but good air entry posteriorly   CVS: RRR  GI: ND, BS +, drain +. NT   Extremities: + pedal edema  CNS: motor strength 0-1/5 in both LEs. 5/5 un UEs  General: NAD, AWAKE, Follows commands    ASSESSMENT:    1. Sepsis secondary to infected right psoas hematoma/epidural abscess with neurological compromise resulting in paraplegia. S/p CT guided right new psoas muscle drainage catheter placement and old drain removal.  2. 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 3. h/o intermittent pain at the site of abdominal pacemaker  4. Heart block s/p AICD 2005 with upgrade to BiV later that year, removed however in 2012 due to trauma and infection. New biventricular pacemaker LUQ 9/2012 by Dr. 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 5. Elevated troponin likely demand ischemia in setting of sepsis. 6. H/p transient nonischemic CMY with EF 45%  7. Acute kidney injury due to neurogenic bladder and Component of ATN. 8. Acute on chronic anemia s/p 2 units  9. Diabetes mellitus. 10.Dyslipidemia. 6. H/o HTN. 12. LBBB. 13. HALI on CPAP. 14. Left shoulder pain  15. Atypical Chest pain   16. Hypoxia due to atelectasis  17.  Bilateral LE DVT s/p IVC    PLAN:    Cont antibiotic per ID  Cont current management including perez catheter  Transfer patient to ICU - talked to dr. Danielle Gibbons    --> TOTAL TIME GREATER THAN 30 MINUTES    CMP:   No results found for: NA, K, CL, CO2, AGAP, GLU, BUN, CREA, GFRAA, GFRNA, CA, MG, PHOS, ALB, TBIL, TP, ALB, GLOB, AGRAT, SGOT, ALT, GPT     CBC:   No results found for: WBC, HGB, HGBEXT, HCT, HCTEXT, PLT, PLTEXT, HGBEXT, HCTEXT, PLTEXT

## 2017-05-14 NOTE — PROGRESS NOTES
Brief Note  Patient seen and examined at bedside as requested by Dr. Phoebe Ramos. Patient was seen by pulmonary team earlier today. See earlier note for details. Patient being transferred to ICU for hypotension. Will try fluid bolus. Recheck labs. On antibiotics. ? Need for transfusion. S/p IVC filter for DVT. V/Q scan low probability. Recheck limited echo. Right psoas hematoma s/p drain.     Yogesh Ahumada MD  [facility]

## 2017-05-14 NOTE — PROGRESS NOTES
RENAL DAILY PROGRESS NOTE    Patient: Marcella Finnegan               Sex: female          DOA: 4/20/2017  3:36 PM        YOB: 1950      Age:  77 y.o.        LOS:  LOS: 24 days     Subjective:     Marcella Finnegan is a 77 y.o.  who presents with Acute paraplegia  Acute paraplegia ([facility] [address] Asked to evaluate for acute/crf stage 3.admitted with paraplegias,psoas muscle hematoma,s/p drain placement. hx of dm,gout  Chief complains: Patient denies nausea, vomiting, chest pain, dizziness, shortness of breath or headache.  - Reviewed last 24 hrs events     Current Facility-Administered Medications   Medication Dose Route Frequency    acetaminophen (TYLENOL) suppository 650 mg  650 mg Rectal Q6H PRN    acetaminophen (TYLENOL) tablet 500 mg  500 mg Oral Q6H PRN    linezolid (ZYVOX) IVPB premix in D5W 600 mg  600 mg IntraVENous Q12H    sodium chloride 0.9 % bolus infusion 1,000 mL  1,000 mL IntraVENous ONCE    diclofenac (VOLTAREN) 1 % topical gel 2 g  2 g Topical Q8H PRN    allopurinol (ZYLOPRIM) tablet 50 mg  50 mg Oral DAILY    gabapentin (NEURONTIN) capsule 200 mg  200 mg Oral BID    aluminum-magnesium hydroxide (MAALOX) oral suspension 15 mL  15 mL Oral QID PRN    albuterol-ipratropium (DUO-NEB) 2.5 MG-0.5 MG/3 ML  3 mL Nebulization Q4HWA RT    mirtazapine (REMERON SOL-TAB) disintegrating tablet 15 mg  15 mg Oral QHS    Lactobacillus Acidoph & Bulgar (FLORANEX) tablet 1 Tab  1 Tab Oral BID    sodium chloride (NS) flush 5-10 mL  5-10 mL IntraVENous Q8H    naloxone (NARCAN) injection 0.1 mg  0.1 mg IntraVENous Multiple    acetaminophen (TYLENOL) tablet 650 mg  650 mg Oral Q4H PRN    0.9% sodium chloride infusion  75 mL/hr IntraVENous CONTINUOUS    famotidine (PEPCID) tablet 20 mg  20 mg Oral DAILY    insulin lispro (HUMALOG) injection   SubCUTAneous AC&HS    0.9% sodium chloride infusion 250 mL  250 mL IntraVENous PRN    cefTRIAXone (ROCEPHIN) 2 g in 0.9% sodium chloride (MBP/ADV) 50 mL MBP  2 g IntraVENous Q24H    insulin glargine (LANTUS) injection 20 Units  20 Units SubCUTAneous DAILY    diphenhydrAMINE (BENADRYL) capsule 50 mg  50 mg Oral Q4H PRN    polyethylene glycol (MIRALAX) packet 17 g  17 g Oral TID    cholecalciferol (VITAMIN D3) tablet 2,000 Units  2,000 Units Oral DAILY    docusate sodium (COLACE) capsule 100 mg  100 mg Oral DAILY AFTER BREAKFAST    oxyCODONE-acetaminophen (PERCOCET 7.5) 7.5-325 mg per tablet 1 Tab  1 Tab Oral Q4H PRN    pravastatin (PRAVACHOL) tablet 40 mg  40 mg Oral QHS    senna-docusate (PERICOLACE) 8.6-50 mg per tablet 2 Tab  2 Tab Oral PCD    folic acid (FOLVITE) tablet 1 mg  1 mg Oral DAILY    ondansetron (ZOFRAN) injection 4 mg  4 mg IntraVENous Q8H PRN    glucose chewable tablet 16 g  16 g Oral PRN    glucagon (GLUCAGEN) injection 1 mg  1 mg IntraMUSCular PRN    dextrose (D50W) injection syrg 12.5-25 g  25-50 mL IntraVENous PRN       Objective:     Visit Vitals    BP (!) 87/54 (BP 1 Location: Left arm, BP Patient Position: At rest)    Pulse 83    Temp 99 °F (37.2 °C)    Resp 18    Ht 5' 7\" (1.702 m)    Wt 119.6 kg (263 lb 10.7 oz)    SpO2 96%    Breastfeeding No    BMI 41.3 kg/m2       Intake/Output Summary (Last 24 hours) at 05/14/17 1422  Last data filed at 05/14/17 1343   Gross per 24 hour   Intake              480 ml   Output             1175 ml   Net             -695 ml       Physical Examination:     GEN: AAO X 3, NAD  RS: Chest is bilateral equal, no wheezing / rales / crackles  CVS: S1-S2 heard, RRR, No S3 / murmur  Abdomen: Soft, Non tender, Not distended, Positive bowel sounds, no organomegaly, no CVA / supra pubic tenderness  Extremities: + edema, no cyanosis, skin is warm on touch  CNS: Awake & follows commands, CN II-XII are grossly intact. HEENT: Head is atraumatic, PERRLA, conjunctiva pink & non icteric.  No JVD or carotid bruit   Psychiatric: Normal mood, affect, judgement & memory    Musculoskeletal: No gross joints or bone deformities   Lymph Node: No palpable cervical, axillary or groin lymphadenopathy. Data Review:      Labs:     Hematology:   Recent Labs      05/13/17   0545  05/12/17   0820   WBC  12.8  13.3*   HGB  7.3*  8.0*   HCT  22.7*  24.6*     Chemistry:   Recent Labs      05/13/17   0545  05/12/17   0820   BUN  24*  21*   CREA  2.68*  2.08*   CA  8.0*  8.3*   K  3.6  3.6   NA  135*  135*   CL  100  98*   CO2  27  30   GLU  144*  89        Images:    XR (Most Recent). CXR reviewed by me and compared with previous CXR   Results from Hospital Encounter encounter on 04/20/17   XR CHEST PORT   Narrative CHEST PORTABLE    CPT CODE: 00062    COMPARISON: 5/13/2017    INDICATIONS: Increased shortness of breath and worsening cough    FINDINGS: There is a right-sided PICC line with its tip overlying the right  subclavian vein. The heart is normal in size. Status post median sternotomy. Diffuse right lung infiltrates are again seen not significantly changed. Probable right pleural effusion. Impression Impression:    Diffuse right lung infiltrates not significantly changed in the one-day  interval. Probable right pleural effusion. PICC line with its tip in the right subclavian vein. CT (Most Recent)   Results from Hospital Encounter encounter on 04/20/17   CT DRAIN ABS W CATH PERC   Narrative PREOPERATIVE DIAGNOSIS: Right psoas muscle abscess. POSTOPERATIVE DIAGNOSIS: Same    INDICATION: Residual right psoas muscle fluid collection. ATTENDING: JOANN Colindres: None. PROCEDURES:  1. New CT-guided drainage of a small right psoas muscle residual fluid  collection and drainage catheter placement. 2. Old right psoas muscle drainage catheter removal.    ANESTHESIA: Local 1% lidocaine as well as moderate intravenous sedation with  Versed and fentanyl given and monitored per independently trained interventional  radiology nurse under my direct supervision for 30 minutes.  Please see detailed  nursing records for medication dosing. CONTRAST: None. COMPLICATIONS: None    DRAIN: No    CATHETER: None. EBL: None. SPECIMEN: None. TECHNIQUE:     All CT scans at this facility are performed using dose optimization technique as  appropriate to a performed exam, to include automated exposure control,  adjustment of the mA and/or kV according to patient size (including appropriate  matching for site-specific examinations), or use of iterative reconstruction  technique. The risks, benefits, and alternatives were discussed. Written and verbal consent  obtained. Patient was placed supine on CT table and the right lower quadrant  region was prepped and draped in usual sterile fashion. Maximum sterile barrier  technique was used. Timeout was performed. 1% lidocaine was then used. Preliminary CT imaging of the lower thoracic and lumbar spine was obtained. Under direct CT fluoroscopy guidance using 18-gauge trocar needle was advanced  down into collection. J-wire was advanced through the needle. Serial dilatations  were performed. 8.5 [employer] all-purpose drainage catheter was placed into  collection under direct CT fluoroscopy. Distal coil was formed. Catheter was  flushed with normal saline and attached to bulb suction. External tubing was  affixed to the skin with 0 proline. Sterile dressing was applied. Old right psoas muscle drainage catheter has been removed without difficulty and  discarded. Sterile dressing was applied. Patient tolerated procedure fairly well. There were no immediate complications. Patient was transferred to recovery in stable condition. Dr. Michelle Mercado was  present and performed the procedure. FINDINGS: Initial imaging demonstrated very minimal residual right psoas muscle  fluid collection extending inferiorly. Significant reduction of the size of the  right psoas muscle is demonstrated.  Previously placed drainage catheter has been  minimally pulled back.    Therefore old drainage catheter was removed given its small position. New drainage catheter was placed as described above. .    CT fluoroscopic guidance demonstrated good position of the access needle as well  as new drainage catheter. Impression IMPRESSION:      1. Successful, uncomplicated new CT-guided right psoas muscle fluid collection  drainage and drainage catheter placement. 2. Successful uncomplicated removal of the old right psoas muscle drainage  catheter. PLAN: Drainage catheter should be attached to bulb suction. Outputs should be  monitored Q shift. Catheter should be flushed in the antegrade fashion with 10  cc of sterile normal saline 3 times a day. EKG Results for orders placed or performed in visit on 11/01/16   AMB POC EKG ROUTINE W/ 12 LEADS, INTER & REP     Status: None    Narrative    AV sequentially paced rhythm with a rate of 67. I have personally reviewed the old medical records and patient's labs    Plan / Recommendation:      1. Acute/crf stage 3.neurogenic bladder. poor record of intake and output. worsening ,probaly related to sepsis,hypotension. increase ivf.discussed with  possible need for dialysis.  want to discuss with his daughters   2.anemia,consider transfusion  3. infected psoas muscle hematoma,s/p drain placement,  3.uti,  4.adjusted meds for renal failure    D/w Dr. June Traylor MD  Nephrology  5/14/2017

## 2017-05-14 NOTE — PROGRESS NOTES
[facility]   Progress Note      Name: Fernando Arnold   : 1950   MRN: [id number]   Date: 2017     [x]I have reviewed the flowsheet and previous days notes. Events overnight reviewed and discussed with nursing staff. Vital signs and records reviewed. HPI:  Patient is a 77 y.o. female  w/hx of HTN, CHF, heart block s/p pacer, multiple vascular procedures in neck veins with residual stenoses, prior Hx of DVT, DM, Afib, recent admission for Rt psoas hematoma. Was readmitted on 17 for acute paraplegia, fevers, sweats. Repeat CT demonstrated increased size of psoas fluid collection. Labs notable for leukocytosis and LISBETH. Pt brought to IR and 300ml of pus aspirated. Hypotensive upon return to floor, and still so after ~1500 ml IVF. Pt was transferred to ICU on 17 for hypotension s/p procedure; recovered with pressor support and fluids. Pt was then transferred out of ICU floor. Last seen by Pulmonary /ICU - 17        Pulmonary was re-consulted on 17 for pt d/t SOB x2-3 days without clear cause. Pt states that her chest and abd \"hurt\", which is causing her to have SOB. Pt states the pain and SOB is worse while lying flat and taking deep breaths, improves with sitting up. Per pt and RN, pt has not eaten much the last couple of days - pain and SOB thought to possibly relate to GERD/acid reflux. Maalox was ordered for pt today to help with abd pain. In addition, workup of this SOB has included cardiac enzymes (3 sets, all negative); VQ scan - low probability for PE; bilateral venous dopplers, which shows (+) DVT in R Common Femoral Vein and (+) DVT in L popliteal vein. Pt then underwent emergent IVC placement d/t inability to anticoagulate using heparin 2/2 hx Psoas muscle hematoma. 17:  Pt had fever overnight 101.8 and became hypotensive 86/79, now improved at 98.8 nd BP 91/53.  In addition, pt states that she was having significant non-productive coughing overnight that has made her chest sore at the right this am. Pt adds that earlier this am she was able to expectorate some phlegm. Denies hemoptysis. Currently Shortness of breath improved. Denies N/V, abd pain. ROS:A comprehensive review of systems was negative except for that written in the HPI. Vital Signs:    Visit Vitals    BP 91/53 (BP 1 Location: Left arm, BP Patient Position: At rest)    Pulse 86    Temp 98.8 °F (37.1 °C)    Resp 16    Ht 5' 7\" (1.702 m)    Wt 119.6 kg (263 lb 10.7 oz)    SpO2 99%    Breastfeeding No    BMI 41.3 kg/m2       O2 Device: Nasal cannula   O2 Flow Rate (L/min): 2 l/min   Temp (24hrs), Av.3 °F (37.9 °C), Min:98.5 °F (36.9 °C), Max:103.1 °F (39.5 °C)       Intake/Output:   Last shift:       07 -  1900  In: 120 [P.O.:120]  Out: 500 [Urine:500]  Last 3 shifts: 1901 -  0700  In: 1103.3 [P.O.:360; I.V.:743.3]  Out: 675 [Urine:675]    Intake/Output Summary (Last 24 hours) at 17 0922  Last data filed at 17 0919   Gross per 24 hour   Intake              480 ml   Output             1175 ml   Net             -695 ml           Physical Exam:    General: Appears fatigued, NAD. Pts daughter at bedside. HEENT:  Anicteric sclerae; pink palpebral conjunctivae; mucosa moist  Resp: Mod AE, no wheeze, no rhonchi  CV:  S1, S2 present; regular rate and rhythm  GI:  Abdomen soft, non-tender; (+) active bowel sounds  Extremities:  (+)1BLE , no cyanosis noted  Skin:  Warm; no rashes/ lesions noted  Neurologic: alert and oriented X 3        DATA:     Results for Yin Ventura (MRN [id number]) as of 2017 13:29   Ref.  Range 2017 04:11 2017 19:52 2017 13:42 2017 04:04 5/10/2017 03:27 5/10/2017 23:35 2017 08:20 2017 05:45   WBC Latest Ref Range: 4.6 - 13.2 K/uL 5.7  7.4 6.9  11.8 13.3 (H) 12.8       Labs: Results:       Chemistry Recent Labs      17   0545  17   0820   GLU  144*  89   NA  135* 135*   K  3.6  3.6   CL  100  98*   CO2  27  30   BUN  24*  21*   CREA  2.68*  2.08*   CA  8.0*  8.3*   AGAP  8  7   BUCR  9*  10*      CBC w/Diff Recent Labs      05/13/17   0545  05/12/17   0820   WBC  12.8  13.3*   RBC  2.80*  3.02*   HGB  7.3*  8.0*   HCT  22.7*  24.6*   PLT  215  203   GRANS  76*  66   LYMPH  15*  17*   EOS  2  0      Coagulation No results for input(s): PTP, INR, APTT in the last 72 hours. No lab exists for component: INREXT    Liver Enzymes No results for input(s): TP, ALB, TBIL, AP, SGOT, GPT in the last 72 hours. No lab exists for component: DBIL   ABG Lab Results   Component Value Date/Time    PHI 7.401 05/14/2017 12:35 AM    PCO2I 39.5 05/14/2017 12:35 AM    PO2I 74 (L) 05/14/2017 12:35 AM    HCO3I 24.5 05/14/2017 12:35 AM      Microbiology No results for input(s): CULT in the last 72 hours. Imaging:  [x]I have personally reviewed the patients radiographs  [x]Radiographs reviewed with radiologist    Portable chest x-ray 5/13/17     CPT code 62731      INDICATION: Wheezing     COMPARISONS: Chest x-rays 5/10/2017 and 5/12/2017     FINDINGS: Frontal view of the chest obtained at 2313 hours. The cardiac  silhouette is mildly enlarged but stable. Epicardial leads are redemonstrated. Right PICC line has been advanced into the SVC. The pulmonary vascular markings  are normal. There is a stable right pleural effusion and basilar atelectasis. The left lung is clear. Eventration of the right diaphragm is stable. There is  no pneumothorax. The osseous structures are stable.     IMPRESSION  IMPRESSION:     1. No change in right pleural effusion and atelectasis.     2. PICC line has been advanced into the SVC. Stable mild cardiomegaly. CXR 5/13/17      FINDINGS: There is a right-sided PICC line with its tip overlying the right  subclavian vein. The heart is normal in size. Status post median sternotomy.   Diffuse right lung infiltrates are again seen not significantly changed. Probable right pleural effusion.     IMPRESSION  Impression:     Diffuse right lung infiltrates not significantly changed in the one-day  interval. Probable right pleural effusion.     PICC line with its tip in the right subclavian vein. IMPRESSION:   · (+) DVT -  R Common Femoral Vein and L popliteal vein; s/p IVC filter. V/Q scan on 5/11/17: low probability. Small PE is possible but doubtful currently and given current clinical status not a candidate for systemic anticoagulation. · Hypoxia- most likely combination of atelectasis, hypoventilation from atypical chest pain and stable right pleural effusion               -  Consider IR- thoracentesis - especially if patient's fever persists  · KUB shows no acute process. · -Atypical Chest pain- doubt cardiac or pulmonary etiology. ?? Diaphragmatic or other irritation from abdominally placed pacemaker? ? Vs GI/ esophageal etiology. · Sepsis - 2/2 infected psoas hematoma/epidural abscess with neurological compromise- off Gent but temp curve with mild upward trend. Currently afebrile. Patient with multiple drug allergies. 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 02 at 2Lpm with      02 sat 99%. Keep HOB >90'- Asp precautions. Pulmonary toilet. Encourage IS. Continue with EZPAP- duo nebs.    · Consider IR- thoracentesis especially if continues to spike fevers   · ID - Continue Antibx per ID. Blood Cx results pending. · Will continue to follow              My assessment/plan was discussed with:  [x]nursing []PT/OT    []respiratory therapy [x]Dr. Ghulam Shah DO   []family []             Loren Dumont NP       Pulmonary Staff  I have independently evaluated the patient. Events overnight noted. Now off antibiotics. Temp / fever curve trending up. Does have multiple reasons for fever- known LE DVT with IVC filter, Psoas abscess, potentially other evolving infectious process. Does have stable right pleural effusion per serial CXR. Adequate BPs at this time. May benefit from IR- thoracentesis on the right- would send to fluid studies and cultures. Antibiotics per ID/ primary team.     Clinical care and time spend evaluating the patient : 35 min      . Heber Mendoza DO  Pulmonary/ Critical Care Medicine

## 2017-05-14 NOTE — ROUTINE PROCESS
Mews score note --2000 patient with low BP 81/47, 85/50, 80/47,84/51   paged Dr. Jenae Alba he ordered a bolus of 500 ml/ lowered patient head of so she  Not sitting high fowlers, BP improved 89/52, 88/53 --   Will continue to monitor closely     Repeat bp after bolus 89/50 and 94/58    2200-- called Dr. Robert Gandara with blood pressures -- we will continue to monitor close and ortho-static blood pressure   -- call him back if she become symptomatic       0023  Dr. Robert Gandara on floor to evaluate patient ordered blood gases and duo jeff treatment now    0400-- [facility]-- [address] [place] [address] -- CALLED  AGAIN BLOOD CULTURES, ANTIBIOTICS       Bedside and Verbal shift change report given to KRISTIN SANCHEZ (oncoming nurse) by Jay Silva RN (offgoing nurse). Report given with SBAR, Kardex, Intake/Output, MAR, Accordion and Recent Results.

## 2017-05-15 ENCOUNTER — APPOINTMENT (OUTPATIENT)
Dept: ULTRASOUND IMAGING | Age: 67
DRG: 853 | End: 2017-05-15
Attending: INTERNAL MEDICINE
Payer: COMMERCIAL

## 2017-05-15 LAB
ANION GAP BLD CALC-SCNC: 9 MMOL/L (ref 3–18)
APTT PPP: 25.5 SEC (ref 23–36.4)
BASOPHILS # BLD AUTO: 0 K/UL (ref 0–0.1)
BASOPHILS # BLD: 0 % (ref 0–2)
BUN SERPL-MCNC: 30 MG/DL (ref 7–18)
BUN/CREAT SERPL: 10 (ref 12–20)
CALCIUM SERPL-MCNC: 8.1 MG/DL (ref 8.5–10.1)
CHLORIDE SERPL-SCNC: 102 MMOL/L (ref 100–108)
CO2 SERPL-SCNC: 25 MMOL/L (ref 21–32)
CREAT SERPL-MCNC: 2.98 MG/DL (ref 0.6–1.3)
DIFFERENTIAL METHOD BLD: ABNORMAL
EOSINOPHIL # BLD: 0.3 K/UL (ref 0–0.4)
EOSINOPHIL NFR BLD: 3 % (ref 0–5)
ERYTHROCYTE [DISTWIDTH] IN BLOOD BY AUTOMATED COUNT: 17.5 % (ref 11.6–14.5)
GLUCOSE BLD STRIP.AUTO-MCNC: 81 MG/DL (ref 70–110)
GLUCOSE BLD STRIP.AUTO-MCNC: 83 MG/DL (ref 70–110)
GLUCOSE BLD STRIP.AUTO-MCNC: 86 MG/DL (ref 70–110)
GLUCOSE BLD STRIP.AUTO-MCNC: 99 MG/DL (ref 70–110)
GLUCOSE SERPL-MCNC: 109 MG/DL (ref 74–99)
HCT VFR BLD AUTO: 21.5 % (ref 35–45)
HGB BLD-MCNC: 7.1 G/DL (ref 12–16)
INR PPP: 1.2 (ref 0.8–1.2)
LYMPHOCYTES # BLD AUTO: 14 % (ref 21–52)
LYMPHOCYTES # BLD: 1.5 K/UL (ref 0.9–3.6)
MAGNESIUM SERPL-MCNC: 1.8 MG/DL (ref 1.6–2.6)
MCH RBC QN AUTO: 26.6 PG (ref 24–34)
MCHC RBC AUTO-ENTMCNC: 33 G/DL (ref 31–37)
MCV RBC AUTO: 80.5 FL (ref 74–97)
MONOCYTES # BLD: 1 K/UL (ref 0.05–1.2)
MONOCYTES NFR BLD AUTO: 10 % (ref 3–10)
NEUTS SEG # BLD: 7.7 K/UL (ref 1.8–8)
NEUTS SEG NFR BLD AUTO: 73 % (ref 40–73)
PHOSPHATE SERPL-MCNC: 4 MG/DL (ref 2.5–4.9)
PLATELET # BLD AUTO: 233 K/UL (ref 135–420)
PMV BLD AUTO: 8.6 FL (ref 9.2–11.8)
POTASSIUM SERPL-SCNC: 4 MMOL/L (ref 3.5–5.5)
PROTHROMBIN TIME: 15.1 SEC (ref 11.5–15.2)
RBC # BLD AUTO: 2.67 M/UL (ref 4.2–5.3)
SODIUM SERPL-SCNC: 136 MMOL/L (ref 136–145)
WBC # BLD AUTO: 10.6 K/UL (ref 4.6–13.2)

## 2017-05-15 PROCEDURE — 65610000006 HC RM INTENSIVE CARE

## 2017-05-15 PROCEDURE — 85610 PROTHROMBIN TIME: CPT | Performed by: PHYSICIAN ASSISTANT

## 2017-05-15 PROCEDURE — 87040 BLOOD CULTURE FOR BACTERIA: CPT | Performed by: INTERNAL MEDICINE

## 2017-05-15 PROCEDURE — 84100 ASSAY OF PHOSPHORUS: CPT | Performed by: INTERNAL MEDICINE

## 2017-05-15 PROCEDURE — 77030011943

## 2017-05-15 PROCEDURE — 85730 THROMBOPLASTIN TIME PARTIAL: CPT | Performed by: PHYSICIAN ASSISTANT

## 2017-05-15 PROCEDURE — 74011250636 HC RX REV CODE- 250/636: Performed by: HOSPITALIST

## 2017-05-15 PROCEDURE — 74011636637 HC RX REV CODE- 636/637: Performed by: INTERNAL MEDICINE

## 2017-05-15 PROCEDURE — 74011250636 HC RX REV CODE- 250/636: Performed by: PHYSICIAN ASSISTANT

## 2017-05-15 PROCEDURE — 74011000250 HC RX REV CODE- 250: Performed by: UROLOGY

## 2017-05-15 PROCEDURE — 74011250636 HC RX REV CODE- 250/636: Performed by: INTERNAL MEDICINE

## 2017-05-15 PROCEDURE — 36415 COLL VENOUS BLD VENIPUNCTURE: CPT | Performed by: INTERNAL MEDICINE

## 2017-05-15 PROCEDURE — P9047 ALBUMIN (HUMAN), 25%, 50ML: HCPCS | Performed by: INTERNAL MEDICINE

## 2017-05-15 PROCEDURE — 82962 GLUCOSE BLOOD TEST: CPT

## 2017-05-15 PROCEDURE — 74011000258 HC RX REV CODE- 258: Performed by: INTERNAL MEDICINE

## 2017-05-15 PROCEDURE — 76604 US EXAM CHEST: CPT

## 2017-05-15 PROCEDURE — 76705 ECHO EXAM OF ABDOMEN: CPT

## 2017-05-15 PROCEDURE — 83735 ASSAY OF MAGNESIUM: CPT | Performed by: INTERNAL MEDICINE

## 2017-05-15 PROCEDURE — 80048 BASIC METABOLIC PNL TOTAL CA: CPT | Performed by: INTERNAL MEDICINE

## 2017-05-15 PROCEDURE — 94640 AIRWAY INHALATION TREATMENT: CPT

## 2017-05-15 PROCEDURE — 74011250637 HC RX REV CODE- 250/637: Performed by: INTERNAL MEDICINE

## 2017-05-15 PROCEDURE — 93308 TTE F-UP OR LMTD: CPT

## 2017-05-15 PROCEDURE — 32555 ASPIRATE PLEURA W/ IMAGING: CPT

## 2017-05-15 PROCEDURE — 85025 COMPLETE CBC W/AUTO DIFF WBC: CPT | Performed by: INTERNAL MEDICINE

## 2017-05-15 PROCEDURE — 74011250637 HC RX REV CODE- 250/637: Performed by: HOSPITALIST

## 2017-05-15 RX ORDER — SODIUM CHLORIDE 9 MG/ML
50 INJECTION, SOLUTION INTRAVENOUS CONTINUOUS
Status: DISCONTINUED | OUTPATIENT
Start: 2017-05-15 | End: 2017-05-16

## 2017-05-15 RX ORDER — HEPARIN SODIUM 5000 [USP'U]/ML
5000 INJECTION, SOLUTION INTRAVENOUS; SUBCUTANEOUS EVERY 8 HOURS
Status: DISCONTINUED | OUTPATIENT
Start: 2017-05-15 | End: 2017-05-15

## 2017-05-15 RX ORDER — ALBUMIN HUMAN 250 G/1000ML
25 SOLUTION INTRAVENOUS EVERY 6 HOURS
Status: COMPLETED | OUTPATIENT
Start: 2017-05-15 | End: 2017-05-17

## 2017-05-15 RX ORDER — FLUCONAZOLE 2 MG/ML
400 INJECTION, SOLUTION INTRAVENOUS ONCE
Status: COMPLETED | OUTPATIENT
Start: 2017-05-15 | End: 2017-05-15

## 2017-05-15 RX ORDER — INSULIN GLARGINE 100 [IU]/ML
10 INJECTION, SOLUTION SUBCUTANEOUS DAILY
Status: DISCONTINUED | OUTPATIENT
Start: 2017-05-15 | End: 2017-05-15

## 2017-05-15 RX ORDER — FLUCONAZOLE 2 MG/ML
200 INJECTION, SOLUTION INTRAVENOUS EVERY 24 HOURS
Status: DISCONTINUED | OUTPATIENT
Start: 2017-05-16 | End: 2017-05-22

## 2017-05-15 RX ORDER — MAGNESIUM SULFATE 1 G/100ML
1 INJECTION INTRAVENOUS ONCE
Status: COMPLETED | OUTPATIENT
Start: 2017-05-15 | End: 2017-05-15

## 2017-05-15 RX ORDER — INSULIN LISPRO 100 [IU]/ML
INJECTION, SOLUTION INTRAVENOUS; SUBCUTANEOUS EVERY 6 HOURS
Status: DISCONTINUED | OUTPATIENT
Start: 2017-05-15 | End: 2017-05-21

## 2017-05-15 RX ADMIN — FAMOTIDINE 20 MG: 20 TABLET ORAL at 09:24

## 2017-05-15 RX ADMIN — ALBUMIN (HUMAN) 25 G: 0.25 INJECTION, SOLUTION INTRAVENOUS at 17:14

## 2017-05-15 RX ADMIN — FLUCONAZOLE 400 MG: 2 INJECTION INTRAVENOUS at 10:40

## 2017-05-15 RX ADMIN — Medication 10 ML: at 13:39

## 2017-05-15 RX ADMIN — IPRATROPIUM BROMIDE AND ALBUTEROL SULFATE 3 ML: 2.5; .5 SOLUTION RESPIRATORY (INHALATION) at 17:32

## 2017-05-15 RX ADMIN — LINEZOLID 600 MG: 600 INJECTION, SOLUTION INTRAVENOUS at 04:48

## 2017-05-15 RX ADMIN — OXYCODONE HYDROCHLORIDE AND ACETAMINOPHEN 1 TABLET: 7.5; 325 TABLET ORAL at 17:31

## 2017-05-15 RX ADMIN — INSULIN GLARGINE 10 UNITS: 100 INJECTION, SOLUTION SUBCUTANEOUS at 09:25

## 2017-05-15 RX ADMIN — MEROPENEM 500 MG: 500 INJECTION, POWDER, FOR SOLUTION INTRAVENOUS at 22:32

## 2017-05-15 RX ADMIN — FOLIC ACID 1 MG: 1 TABLET ORAL at 09:27

## 2017-05-15 RX ADMIN — LACTOBACILLUS TAB 1 TABLET: TAB at 09:24

## 2017-05-15 RX ADMIN — LACTOBACILLUS TAB 1 TABLET: TAB at 17:15

## 2017-05-15 RX ADMIN — MAGNESIUM SULFATE HEPTAHYDRATE 1 G: 1 INJECTION, SOLUTION INTRAVENOUS at 08:26

## 2017-05-15 RX ADMIN — DOCUSATE SODIUM 100 MG: 100 CAPSULE, LIQUID FILLED ORAL at 09:24

## 2017-05-15 RX ADMIN — IPRATROPIUM BROMIDE AND ALBUTEROL SULFATE 3 ML: 2.5; .5 SOLUTION RESPIRATORY (INHALATION) at 20:18

## 2017-05-15 RX ADMIN — Medication 10 ML: at 08:26

## 2017-05-15 RX ADMIN — ALBUMIN (HUMAN) 25 G: 0.25 INJECTION, SOLUTION INTRAVENOUS at 12:26

## 2017-05-15 RX ADMIN — Medication 10 ML: at 21:46

## 2017-05-15 RX ADMIN — SODIUM CHLORIDE 50 ML/HR: 900 INJECTION, SOLUTION INTRAVENOUS at 12:27

## 2017-05-15 RX ADMIN — DIPHENHYDRAMINE HYDROCHLORIDE 50 MG: 25 CAPSULE ORAL at 00:00

## 2017-05-15 RX ADMIN — CHOLECALCIFEROL TAB 25 MCG (1000 UNIT) 2000 UNITS: 25 TAB at 09:24

## 2017-05-15 RX ADMIN — ALLOPURINOL 50 MG: 100 TABLET ORAL at 09:25

## 2017-05-15 RX ADMIN — PHENYLEPHRINE HYDROCHLORIDE 15 MCG/MIN: 10 INJECTION INTRAMUSCULAR; INTRAVENOUS; SUBCUTANEOUS at 18:36

## 2017-05-15 RX ADMIN — ALBUMIN (HUMAN) 25 G: 0.25 INJECTION, SOLUTION INTRAVENOUS at 23:39

## 2017-05-15 RX ADMIN — SODIUM CHLORIDE 100 ML/HR: 900 INJECTION, SOLUTION INTRAVENOUS at 01:05

## 2017-05-15 RX ADMIN — IPRATROPIUM BROMIDE AND ALBUTEROL SULFATE 3 ML: 2.5; .5 SOLUTION RESPIRATORY (INHALATION) at 13:34

## 2017-05-15 RX ADMIN — MEROPENEM 500 MG: 500 INJECTION, POWDER, FOR SOLUTION INTRAVENOUS at 10:40

## 2017-05-15 RX ADMIN — LINEZOLID 600 MG: 600 INJECTION, SOLUTION INTRAVENOUS at 17:04

## 2017-05-15 RX ADMIN — IPRATROPIUM BROMIDE AND ALBUTEROL SULFATE 3 ML: 2.5; .5 SOLUTION RESPIRATORY (INHALATION) at 08:12

## 2017-05-15 NOTE — ROUTINE PROCESS
Bedside and Verbal shift change report given to JUAN FRANCISCO Corrales (oncoming nurse) by Milad Baltazar RN (offgoing nurse). Report included the following information SBAR, Kardex, MAR and Recent Results. SITUATION:    Code Status: Full Code   Reason for Admission: Acute paraplegia   Acute paraplegia [facility] day: 24   Problem List:       Hospital Problems  Date Reviewed: 4/20/2017          Codes Class Noted POA    * (Principal)Acute paraplegia ([facility] ICD-10-CM: G82.20  ICD-9-CM: 344.1  4/20/2017 Yes        Sepsis ([facility] ICD-10-CM: A41.9  ICD-9-CM: 038.9, 995.91  4/20/2017 Yes        Psoas abscess, right ([facility] ICD-10-CM: Q67.19  ICD-9-CM: 567.31  4/20/2017 Yes        Krueger catheter in place on admission ICD-10-CM: Z96.0  ICD-9-CM: V45.89  4/20/2017 Yes        Urinary tract infection due to Enterococcus ICD-10-CM: N39.0, B95.2  ICD-9-CM: 599.0, 041.04  4/20/2017 Yes        Group B streptococcal infection ICD-10-CM: A49.1  ICD-9-CM: 041.02  4/20/2017 Yes        History of Coumadin therapy ICD-10-CM: Z79.01  ICD-9-CM: V58.61  Unknown Yes    Overview Signed 4/6/2017 10:35 PM by Merline Fernandez MD     Anticoagulation for chronic atrial fibrillation; Discontinued on 3/30/2017             Decreased calculated glomerular filtration rate (GFR) ICD-10-CM: R94.4  ICD-9-CM: 794.4  3/30/2017 Yes    Overview Signed 4/6/2017  5:47 PM by Merline Fernandez MD     Calculated GFR equivalent to that of CKD stage 3 = 30-59 ml/min             Iliopsoas muscle hematoma ICD-10-CM: S70.10XA  ICD-9-CM: 924.00  3/30/2017 Yes        Psoas hematoma, right, secondary to anticoagulant therapy ICD-10-CM: S30. 1XXA  ICD-9-CM: 924.9, E934.2  3/30/2017 Yes        Impaired mobility and ADLs ICD-10-CM: Z74.09  ICD-9-CM: 799.89  3/30/2017 Yes        Benign hypertensive heart disease with systolic CHF, NYHA class 2 (HCC) (Chronic) ICD-10-CM: I11.0, I50.20  ICD-9-CM: 402.11, 428.20, 428.0  9/5/2012 Yes        AICD generator infection ([facility] ICD-10-CM: T82. 7XXA  ICD-9-CM: 996.61  8/20/2012 Yes    Overview Signed 8/20/2012  6:13 PM by Sonal Wong MD     S/p explant 4 leads 8/20/12             Type 2 diabetes mellitus with diabetic neuropathy (HCC) (Chronic) ICD-10-CM: E11.40  ICD-9-CM: 250.60, 357.2  6/28/2011 Yes              BACKGROUND:    Past Medical History:   Past Medical History:   Diagnosis Date    Acute paraplegia ([facility] 4/20/2017    Benign hypertensive heart disease with systolic CHF, NYHA class 2 ([facility] [address] 9/5/2012    Biventricular implantable cardioverter-defibrillator in situ 04/28/2005    Upgraded to BiV AICD; gen change 4/2008; pocket revision 10/2009; Abdominal - done on 8/22/2012 by Dr. Kleber Villagran Cardiac cath 08/15/1996    Patent coronaries. Elev LVEDP. EF 50-55%.  Cardiac echocardiogram 06/23/2015    Ltd study. EF 45-50%. Mild, diffuse hypk. Severe apical hypk. No mass or thrombus was clearly identified, although imaging was suboptimal.      Cardiac nuclear imaging test 06/19/2015    Fixed distal apical, distal septal defect more likely due to RV pacing than prior infarct. No ischemia. EF 46%. RWMA c/w RV pacing. Nondiagnostic EKG on pharm stress test.      Cardiovascular lower extremity venous duplex 09/04/2012    Acute, non-occlusive DVT in CFV on right. No DVT on left. No superficial thrombosis bilaterally.  Cardiovascular upper extremity venous duplex 08/27/2012    DVT in axillary vein on left. Left subclavian was not visualized.     Chronic anemia 9/5/2012    Chronic systolic heart failure (HCC)     Decreased calculated glomerular filtration rate (GFR) 3/30/2017    Calculated GFR equivalent to that of CKD stage 3 = 30-59 ml/min    Diabetic neuropathy associated with type 2 diabetes mellitus ([facility] 6/28/2011    Difficult airway for intubation 08/22/2012    see anesthesia airway note    Dyslipidemia 6/28/2011    Gout     History of complete heart block 6/28/2011    History of Coumadin therapy Anticoagulation for DVT of the LUE; Discontinued on 3/30/2017    History of deep venous thrombosis 9/5/2012    Left upper extremity    History of pyelonephritis 3/30/2017    Left bundle branch block (LBBB) on electrocardiogram 6/28/2011    Nonischemic cardiomyopathy ([facility] 6/28/2011    Obesity (BMI 35.0-39.9 without comorbidity) ([facility] 3/13/2017    Obstructive sleep apnea on CPAP 2/7/2012    Psoas abscess, right ([facility] 4/20/2017    Psoas hematoma, right, secondary to anticoagulant therapy 3/30/2017    Type 2 diabetes mellitus with diabetic neuropathy ([facility] [address] 6/28/2011         Patient taking anticoagulants: No     ASSESSMENT:    Changes in Assessment Throughout Shift: Transferred to ICU from [address] secondary to persistent hypotension. NS IV bolus 2 liters given per order. SBP:  mmHg range. Incontinent of large amount loose / soft brown stool. Lethargic.      Patient has Central Line: Yes - Reasons if yes: Poor venous access     Patient has Krueger Cath: Yes - Reasons if yes: Neurogenic bladder; Strict intake and output      Last Vitals:     Vitals:    05/14/17 1845 05/14/17 1900 05/14/17 1948 05/14/17 2000   BP: 94/43 93/48  116/43   Pulse: 80 78  90   Resp: 9 9  11   Temp:    99 °F (37.2 °C)   TempSrc:       SpO2: 100% 100% 100% 100%   Weight:       Height:            IV and DRAINS (will only show if present)   Drain 8.5 FR Right psoas muscle drain 05/10/17 Right Other (comment)-Site Assessment: Clean, dry, & intact  [REMOVED] Drain 04/20/17 Right Other (comment)-Site Assessment: Clean, dry, & intact  [REMOVED] Peripheral IV 04/20/17 Left Arm-Site Assessment: Clean, dry, & intact  [REMOVED] Peripheral IV 04/20/17 Right Antecubital-Site Assessment: Clean, dry, & intact  [REMOVED] Triple Lumen Triple Lumen CVL 04/20/17 Left Other(comment)-Site Assessment: Clean, dry, & intact  [REMOVED] Peripheral IV 04/21/17 Left Arm-Site Assessment: Clean, dry, & intact  [REMOVED] Peripheral IV 04/25/17 Left Forearm-Site Assessment: Clean, dry, & intact  PICC Double Lumen 72/84/33 Right;Basilic-Site Assessment: Clean, dry, & intact     WOUND (if present)   Wound Type:  Groin / linnette excoriation   Dressing present Dressing Present : Yes   Wound Concerns/Notes:  none     PAIN    Pain Assessment    Pain Intensity 1: 0 (05/14/17 1600)    Pain Location 1: Abdomen    Pain Intervention(s) 1: Medication (see MAR)    Patient Stated Pain Goal: 0  o Interventions for Pain:  none  o Intervention effective: N/A  o Time of last intervention: N/A  o Reassessment Completed: yes      Last 3 Weights:  Last 3 Recorded Weights in this Encounter    05/12/17 0630 05/13/17 0613 05/14/17 0542   Weight: 117.6 kg (259 lb 3.2 oz) 117.8 kg (259 lb 11.2 oz) 119.6 kg (263 lb 10.7 oz)     Weight change: 1.8 kg (3 lb 15.5 oz)     INTAKE/OUPUT    Current Shift:      Last three shifts: 05/13 0701 - 05/14 1900  In: 3883.8 [P.O.:720; I.V.:3133.8]  Out: 1860 [Urine:1850; Drains:10]     LAB RESULTS     Recent Labs      05/14/17   1630  05/13/17   0545  05/12/17   0820   WBC  11.5  12.8  13.3*   HGB  7.6*  7.3*  8.0*   HCT  23.2*  22.7*  24.6*   PLT  237  215  203        Recent Labs      05/14/17   1630  05/13/17   0545  05/12/17   0820   NA  135*  135*  135*   K  4.0  3.6  3.6   GLU  132*  144*  89   BUN  31*  24*  21*   CREA  3.26*  2.68*  2.08*   CA  8.2*  8.0*  8.3*   MG   --    --   1.9       RECOMMENDATIONS AND DISCHARGE PLANNING     1. Pending tests/procedures/ Plan of Care or Other Needs: Echocardiogram 5-15-17     2. Discharge plan for patient and Needs/Barriers: TBD    3. Estimated Discharge Date: TBD; Posted on Whiteboard in Patients Room: Yes      4. The patient's care plan was reviewed with the oncoming nurse.        \"HEALS\" SAFETY CHECK      Fall Risk    Total Score: 3    Safety Measures: Safety Measures: Bed/Chair-Wheels locked, Bed in low position, Call light within reach, Emergency bedside equipment, Fall prevention (comment), Nurse at bedside, Side rails X 3    A safety check occurred in the patient's room between off going nurse and oncoming nurse listed above. The safety check included the below items  Area Items   H  High Alert Medications - Verify all high alert medication drips (heparin, PCA, etc.)   E  Equipment - Suction is set up for ALL patients (with vania)  - Red plugs utilized for all equipment (IV pumps, etc.)  - WOWs wiped down at end of shift.  - Room stocked with oxygen, suction, and other unit-specific supplies   A  Alarms - Bed alarm is set for fall risk patients  - Ensure chair alarm is in place and activated if patient is up in a chair   L  Lines - Check IV for any infiltration  - Krueger bag is empty if patient has a Krueger   - Tubing and IV bags are labeled   S  Safety   - Room is clean, patient is clean, and equipment is clean. - Hallways are clear from equipment besides carts. - Fall bracelet on for fall risk patients  - Ensure room is clear and free of clutter  - Suction is set up for ALL patients (with vania)  - Hallways are clear from equipment besides carts.    - Isolation precautions followed, supplies available outside room, sign posted     Regina Chen RN

## 2017-05-15 NOTE — PROGRESS NOTES
ICU Progress note      Name: Carol Hu   : 1950   MRN: [id number]   Date: 5/15/2017     [x]I have reviewed the flowsheet and previous days notes. Events overnight reviewed and discussed with nursing staff. Vital signs and records reviewed. HPI:  Patient is a 77 y.o. female  w/hx of HTN, CHF, heart block s/p pacer, multiple vascular procedures in neck veins with residual stenoses, prior Hx of DVT, DM, Afib, recent admission for Rt psoas hematoma. Was readmitted on 17 for acute paraplegia, fevers, sweats. Repeat CT demonstrated increased size of psoas fluid collection. Labs notable for leukocytosis and LISBETH. Pt brought to IR and 300ml of pus aspirated. Hypotensive upon return to floor, and still so after ~1500 ml IVF. Pt was transferred to ICU on 17 for hypotension s/p procedure; recovered with pressor support and fluids. Pt was then transferred out of ICU floor. Last seen by Pulmonary /ICU - 17        Pulmonary was re-consulted on 17 for pt d/t SOB x2-3 days without clear cause. Pt states that her chest and abd \"hurt\", which is causing her to have SOB. Pt states the pain and SOB is worse while lying flat and taking deep breaths, improves with sitting up. Per pt and RN, pt has not eaten much the last couple of days - pain and SOB thought to possibly relate to GERD/acid reflux. Maalox was ordered for pt today to help with abd pain. In addition, workup of this SOB has included cardiac enzymes (3 sets, all negative); VQ scan - low probability for PE; bilateral venous dopplers, which shows (+) DVT in R Common Femoral Vein and (+) DVT in L popliteal vein. Pt then underwent emergent IVC placement d/t inability to anticoagulate using heparin 2/2 hx Psoas muscle hematoma. 5/15/17: transferred to ICU for altered mental status and hypotension; responded to 2 L IVF. Not on pressors. GPC in blood Cx growing since yesterday.  GCS now 10 ([id number])                   ROS:A comprehensive review of systems was negative except for that written in the HPI. Vital Signs:    Visit Vitals    /41    Pulse 85    Temp (!) 100.7 °F (38.2 °C)    Resp 13    Ht 5' 7\" (1.702 m)    Wt 119.6 kg (263 lb 10.7 oz)    SpO2 98%    Breastfeeding No    BMI 41.3 kg/m2       O2 Device: Nasal cannula   O2 Flow Rate (L/min): 2 l/min   Temp (24hrs), Av.3 °F (37.4 °C), Min:98.1 °F (36.7 °C), Max:100.7 °F (38.2 °C)       Intake/Output:   Last shift:         Last 3 shifts:  1901 - 05/15 0700  In: 3823.8 [P.O.:360; I.V.:3433.8]  Out: 1560 [Urine:1550; Drains:10]    Intake/Output Summary (Last 24 hours) at 05/15/17 0757  Last data filed at 17 2200   Gross per 24 hour   Intake          1946.25 ml   Output              485 ml   Net          1461.25 ml           Physical Exam:    General: Appears fatigued, NAD. Pts daughter at bedside. HEENT:  Anicteric sclerae; pink palpebral conjunctivae; mucosa moist  Resp: Mod AE, no wheeze, no rhonchi  CV:  S1, S2 present; regular rate and rhythm  GI:  Abdomen soft, non-tender; (+) active bowel sounds  Extremities:  (+)1BLE , no cyanosis noted  Skin:  Warm; no rashes/ lesions noted  Neurologic: alert and oriented X 3        DATA:     Results for Roverto Pacheco (MRN [id number]) as of 2017 13:29   Ref.  Range 2017 04:11 2017 19:52 2017 13:42 2017 04:04 5/10/2017 03:27 5/10/2017 23:35 2017 08:20 2017 05:45   WBC Latest Ref Range: 4.6 - 13.2 K/uL 5.7  7.4 6.9  11.8 13.3 (H) 12.8       Labs: Results:       Chemistry Recent Labs      05/15/17   0604  17   1630  17   0545   GLU  109*  132*  144*   NA  136  135*  135*   K  4.0  4.0  3.6   CL  102  100  100   CO2  25  26  27   BUN  30*  31*  24*   CREA  2.98*  3.26*  2.68*   CA  8.1*  8.2*  8.0*   AGAP  9  9  8   BUCR  10*  10*  9*   AP   --   298*   --    TP   --   5.9*   --    ALB   --   1.3*   --    GLOB   --   4.6*   --    AGRAT   --   0.3*   --       CBC w/Diff Recent Labs 05/15/17   0604  05/14/17   1630  05/13/17   0545   WBC  10.6  11.5  12.8   RBC  2.67*  2.85*  2.80*   HGB  7.1*  7.6*  7.3*   HCT  21.5*  23.2*  22.7*   PLT  233  237  215   GRANS  73  72  76*   LYMPH  14*  16*  15*   EOS  3  3  2      Coagulation No results for input(s): PTP, INR, APTT in the last 72 hours. No lab exists for component: INREXT, INREXT    Liver Enzymes Recent Labs      05/14/17   1630   TP  5.9*   ALB  1.3*   AP  298*   SGOT  83*      ABG No results found for: PH, PHI, PCO2, PCO2I, PO2, PO2I, HCO3, HCO3I, FIO2, FIO2I   Microbiology Recent Labs      05/14/17   0455  05/14/17   0432   CULT  NO GROWTH 1 DAY  CULTURE IN PROGRESS,FURTHER UPDATES TO FOLLOW          Imaging:  [x]I have personally reviewed the patients radiographs  [x]Radiographs reviewed with radiologist    Portable chest x-ray 5/13/17     CPT code 68956      INDICATION: Wheezing     COMPARISONS: Chest x-rays 5/10/2017 and 5/12/2017     FINDINGS: Frontal view of the chest obtained at 2313 hours. The cardiac  silhouette is mildly enlarged but stable. Epicardial leads are redemonstrated. Right PICC line has been advanced into the SVC. The pulmonary vascular markings  are normal. There is a stable right pleural effusion and basilar atelectasis. The left lung is clear. Eventration of the right diaphragm is stable. There is  no pneumothorax. The osseous structures are stable.     IMPRESSION  IMPRESSION:     1. No change in right pleural effusion and atelectasis.     2. PICC line has been advanced into the SVC. Stable mild cardiomegaly. CXR 5/13/17      FINDINGS: There is a right-sided PICC line with its tip overlying the right  subclavian vein. The heart is normal in size. Status post median sternotomy. Diffuse right lung infiltrates are again seen not significantly changed.   Probable right pleural effusion.     IMPRESSION  Impression:     Diffuse right lung infiltrates not significantly changed in the one-day  interval. Probable right pleural effusion.     PICC line with its tip in the right subclavian vein. IMPRESSION:   (+) DVT -  R Common Femoral Vein and L popliteal vein; s/p IVC filter. V/Q scan on 5/11/17: low probability. Small PE is possible but doubtful currently and given current clinical status not a candidate for systemic anticoagulation. · Hypoxia- most likely combination of atelectasis, hypoventilation from atypical chest pain and stable right pleural effusion  ·              - will consult IR for thoracentesis given recent sepsis  · Sepsis - initially 2/2 infected psoas hematoma/epidural abscess (GAS) with neurological compromise- now with GPC in blood and right-sided infiltrate. ? PICC infection, ? UTI, ? HCAP w/PPE, fungemia Will repeat Cx, plan for thoracentesis, and possibly remove PICC. 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, resulting in spinal cord infarct- stable  · Enterococcus faecalis in urine (gent susceptible)  · Bacteremia   · Infected pacer pocket  · Psoas hematoma  · LISBETH - D/t neurogenic bladder and component of ATN  · NSTEMI   · Hx of CHF and Afib  · Prior complicated pacer placement (apparently upper extremity and central vein stenoses)  · Inability to obtain MRI (pacer)  · Elevated LAEs        PLAN:   · Resp -  Continue supplemental 02 prn keeping 02 sats >92%. 02 at 2Lpm with      02 sat 99%. Keep HOB >90'- Asp precautions. Pulmonary toilet. Encourage IS. Continue with EZPAP- duo nebs. · CV: responsive to volume. May require pressors however. Will use phenylephrine with goal MAP >65. Phenylephrine not MAO inhibitor (OK with linezolid)  · GI: ADA diet, though will DC for now given mental status. Feeding tube tomorrow if still unable to take PO. No PPI required; on pepcid. RUQ U/S given elevated LAEs  · Renal: LISBETH since admission, ?worse after gent.  No acute need for HD. DC IVF now, start 25% albumin; straight cath x 48 hours so that urine Cx may be sent  · ID: empiric ABX broadened to add linezolid to ceftriaxone. Will change latter to edward. If pneumonic source less likely based on thoracentesis, would consider dapto. Repeat blood Cx today. Possibly pull picc. Thora today. Pull perez, straight cath x 48 hours, and send urine Cx in 48 hours. · Heme: known DVTs, but recent psoas bleed. Has been over a month however; drain still in place. Will start prophy heparin over next few days, follow Hb. Transfuse at Hb <7. IVC filter in place  · Endo: DC lantus as pt will be NPO; re-start when alert enough to eat. Q6 fingersticks  · Neuro: likely encephalopathy 2/2 sepsis. DC mirtazipine     PICC and perez (possibly remove PICC)    Caty Hart MD  PCCM  45 min             My assessment/plan was discussed with:  [x]nursing []PT/OT    []respiratory therapy [x]Dr. Leigha Chin, DO   []family []             Catalina Pascual MD         Clinical care and time spend evaluating the patient : 35 min      . Geovany Pascual MD  Pulmonary/ Critical Care Medicine

## 2017-05-15 NOTE — INTERDISCIPLINARY ROUNDS
CRITICAL CARE INTERDISCIPLINARY ROUNDS      Patient Information:    Name:   Pooja Benton    Age:   77 y.o. Admission Date:   4/20/2017    Readmit Risk Assessment Information:      Readmit Risk Tool Support Systems: Child(lea), Family member(s), Religion / braulio community, Friends \ neighbors, Spouse/Significant Other/Partner, Relationship with Primary Physician Group: Seen at least one time within the past 6 months    Surgery Date:      Day of Stay:     Expected Discharge Date:        Attending Provider:   Berto Camargo MD    Surgeon:        Consultant:       Primary Care Provider:   Angel Luis Doran DO    Problem List:     Patient Active Problem List   Diagnosis Code    Nonischemic cardiomyopathy ([facility] I42.8    History of complete heart block Z86.79    Biventricular implantable cardioverter-defibrillator in situ Z95.810    Left bundle branch block (LBBB) on electrocardiogram I44.7    Type 2 diabetes mellitus with diabetic neuropathy ([facility] E11.40    Dyslipidemia E78.5    Diabetic neuropathy associated with type 2 [facility] mellitus ([facility] E11.40    Obstructive sleep apnea on CPAP G47.33, Z99.89    AICD generator infection ([facility] T82. 7XXA    Difficult airway for intubation T88. 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 1XXA    Impaired mobility and ADLs Z74.09    History of Coumadin therapy Z79.01    Gout M10.9    Acute paraplegia (HCC) G82.20    Sepsis ([facility] A41.9    Psoas abscess, right ([facility] K68.12    Perez catheter in place on admission Z96.0    Urinary tract infection due to Enterococcus N39.0, B95.2    Group B streptococcal infection A49.1       Principal Problem:  Acute paraplegia ([facility] [facility]    Procedure:       During rounds the following quality care indicators and evidence based practices were addressed :     DVT Prophylaxis, Pressure Injury Prevention, Pain Management, Sepsis resuscitation and management, Nutritional Status, Critical Care Interventions Airways, Drains, Lines and Pressors and IHI Bundles: Central Line Bundle Followed  and Perez Bundle Followed           Acute MI/PCI:   Not applicable    Heart Failure:    Not applicable    Cardiac Surgery:  Not applicable    SCIP Measures for other Surgeries:   Not applicable    Pneumonia:    Appropriate Antibiotic Selection (ICU versus Non-ICU)    Stroke:  Patient's Personal Risk Factors for Stroke are:   hypertension, family history, hyperlipidemia or diabetes mellitus    NIH Stroke Score       Transfer Level of Care:  Not Ready for Transfer    The patient will require the following interventions based on the Readmission Risk Assessment:  Pharmacy evaluation and teaching, Care Management involvement for home health follow up for:  mobility and assistance with ADL's and Spiritual Care evaluation      Discharge Management:  Group Home    Anticipated Discharge Date:  2days      Interdisciplinary team rounds were held  with the following team membersCare Management, Diabetes Treatment Specialist, Nursing, Nutrition, Pastoral Care, Pharmacy, Physician and Clinical Coordinator and the  patient and healthcare POA (documentation required). Plan of care discussed. See clinical pathway and/or care plan for interventions and desired outcomes. Patient lethargic complains of pain to right side. Paced rhythm. Thoracentesis planned, Merrem added to regimen and new blood cultures sent.  Discontinue perez per Dr. Geovanny Ventura straight cath every 6hors.

## 2017-05-15 NOTE — PROGRESS NOTES
SUBJECTIVE:    Patient complaints of intermittent abdominal pain. No nausea or vomiting. No SOB or cough. Goes back and forth to sleep. OBJECTIVE:    Visit Vitals    /48    Pulse 97    Temp 98.9 °F (37.2 °C)    Resp 19    Ht 5' 7\" (1.702 m)    Wt 119.6 kg (263 lb 10.7 oz)    SpO2 98%    Breastfeeding No    BMI 41.3 kg/m2     RS: diminished in bases, no wheezes. CVS: RRR  GI: ND, BS +, drain +. NT   Extremities: + pedal edema  CNS: motor strength 0-1/5 in both LEs. 5/5 un UEs  General: NAD, AWAKE, Follows commands    ASSESSMENT:    1. Sepsis secondary to infected right psoas hematoma/epidural abscess with neurological compromise resulting in paraplegia. S/p CT guided right new psoas muscle drainage catheter placement and old drain removal.  2. 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 3. h/o intermittent pain at the site of abdominal pacemaker  4. Heart block s/p AICD 2005 with upgrade to BiV later that year, removed however in 2012 due to trauma and infection. New biventricular pacemaker LUQ 9/2012 by Dr. 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 5. Elevated troponin likely demand ischemia in setting of sepsis. 6. H/p transient nonischemic CMY with EF 45%  7. Acute kidney injury due to neurogenic bladder and Component of ATN. 8. Acute on chronic anemia s/p 2 units  9. Diabetes mellitus. 10.Dyslipidemia. 6. H/o HTN. 12. LBBB. 13. HALI on CPAP. 14. Left shoulder pain  15. Atypical Chest pain   16. Hypoxia due to atelectasis  17. Bilateral LE DVT s/p IVC  18. Recurrent fever  19. Positive 2/4 GPC bacteremia  20. Possible right Pleural effusion   21. Severe protein malnutrition     PLAN:    Cont antibiotic per ID  Cont current management.   Follow final blood c/s  Pulmonology to follow    --> TOTAL TIME GREATER THAN 30 MINUTES    CMP:   Lab Results   Component Value Date/Time     05/15/2017 06:04 AM    K 4.0 05/15/2017 06:04 AM  05/15/2017 06:04 AM    CO2 25 05/15/2017 06:04 AM    AGAP 9 05/15/2017 06:04 AM     (H) 05/15/2017 06:04 AM    BUN 30 (H) 05/15/2017 06:04 AM    CREA 2.98 (H) 05/15/2017 06:04 AM    GFRAA 19 (L) 05/15/2017 06:04 AM    GFRNA 16 (L) 05/15/2017 06:04 AM    CA 8.1 (L) 05/15/2017 06:04 AM    MG 1.8 05/15/2017 06:04 AM    PHOS 4.0 05/15/2017 06:04 AM    ALB 1.3 (L) 05/14/2017 04:30 PM    TP 5.9 (L) 05/14/2017 04:30 PM    GLOB 4.6 (H) 05/14/2017 04:30 PM    AGRAT 0.3 (L) 05/14/2017 04:30 PM    SGOT 83 (H) 05/14/2017 04:30 PM    ALT 69 (H) 05/14/2017 04:30 PM        CBC:   Lab Results   Component Value Date/Time    WBC 10.6 05/15/2017 06:04 AM    HGB 7.1 (L) 05/15/2017 06:04 AM    HCT 21.5 (L) 05/15/2017 06:04 AM     05/15/2017 06:04 AM

## 2017-05-15 NOTE — DIABETES MGMT
GLYCEMIC CONTROL PLAN OF CARE    Assessment/Recommendations:  Pt discussed in interdisciplinary rounds. Blood glucose within targets, trending down slightly. Noted Lantus decreased to 10 units daily. Continue inpatient monitoring and intervention. Most recent blood glucose values:  5/13/2017 21:06 5/14/2017 06:59 5/14/2017 11:45 5/14/2017 22:21 5/15/2017 08:22   148 (H) 124 (H) 189 (H) 138 (H) 99     Current A1C of 8.0% is equivalent to average blood glucose of 183 mg/dl over the past 2-3 months.     Current hospital diabetes medications:   Lantus insulin 10 units daily   Correctional Lispro insulin 4 times daily ACHS (very insulin resistant)    Previous day's insulin requirements:   20 units Lantus insulin   3 units of Lispro insulin     Home diabetes medications:  Lantus insulin 15 units nightly   Novolog insulin per sliding scale   Januvia     Diet:  NPO    Education:  __x__Refer to Diabetes Education Record (3/31/17)            ____Education not indicated at this time      Dionte Mattson RD, CDE

## 2017-05-15 NOTE — ROUTINE PROCESS
T. Upper PICC Line flushes good but no blood return.  TPA 1mg each port without success, Lab notified for Lab draw this am.

## 2017-05-15 NOTE — PROGRESS NOTES
Infectious Disease Progress Note    Requested by: Dr. Nigel Monson, dr. Navya Boucher    Reason: sepsis, acute paraplegia    Current abx Prior abx   Ceftriaxone since 4/24  linezolid since 5/14 Cefepime 4/20-4/21  vancomycin  4/20-4/24  Gentamicin 4/21-4/24  Metronidazole  4/20-4/24     Lines:   PICC RUE 4/26    Assessment :    77 y.o., right handed female, with an established history of hypertension, complete heart block with permanent pacemaker placed, diabetes mellitus, diabetic peripheral neuropathy, obesity, admitted to [facility] on 4/20/17. Clinical presentation consistent with  Sepsis (POA)  secondary to group B streptococcus bloodstream infection (positive blood cultures 4/20, negative blood cultures 4/21). Most likely source of bloodstream infection is infected right psoas hematoma/abscess. 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 No signs of neurological recovery on today's exam     2 week h/o pain at the site of abdominal pacemaker, tenderness at the site - however, patient doesn't have erythema at the site of pacemaker. No fluid collection noted at pacemaker pocket site per usg 4/24. Quick clearance of bacteremia would argue against endocarditis/endovascular infection. At this time risk of removal of pacemaker/general pocket change exceed the benefit. Discussed with cardiology. Would recommend close monitoring. Abdominal usg 4/24 doesn't reveal evidence of abscess/fluid collection at the site of pacemaker pocket. Enterococcus in urine cultures 4/20 likely colonizer    Acute renal failure: nephrology consult appreciated. Difficult to determine exact etiology of ARF at this time. Slight improvement in creatinine today. Low grade fever overnight - Ct scan 5/4 reveals increasing focal area within the right psoas muscle inferior to the previously drained area.  could represent either spread of infection inferiorly or intramuscular hematoma. S/p ct guided IR drain check - old drain removed. New drain placed inferiorly on 5/10    No evidence of pneumonia    High grade fevers with tm:102 on 5/10, tm:103 on 5/13 - ?due to infected picc line. One of two sets of blood cultures positive for gram positive cocci. ?right sided aspiration pneumonia/parapneumonic effusion - plans for thoracentesis today ? evolving cystitis. One of two sets of blood cultures could be contaminant. Will need to follow repeat blood cultures to determine this. Acute renal failure: multifactorial - nephrology consult appreciated     bilateral LE dvt - s/p IVC filter placement 5/11    No leukocytosis         Recommendations:    1. D/c ceftriaxone. Start meropenem for aspiration/hcap, probable right parapneumonic effusion. start fluconazole to cover for possible candida cystitis  2. Continue linezolid for now  3. Agree with repeat blood cultures  4. Remove perez. Recommend intermittent straight cath  5. Remove picc line if blood culture isolate is staph aureus or repeat blood cultures positive  6. Monitor renal function, cbc, clinically  7. Agree with thoracentesis. Above plan was discussed in details with rn, , family at bedside. Cc time spent > 35 min. Please call me if any further questions or concerns. Will continue to participate in the care of this patient. subjective:    C/o discomfort around right flank catheter. Occasional  chills. Denies cp, sob. Unable to move legs. decreased pain at the site of pacemaker left abdomen. No nausea, vomiting. No new rash/itching/joint pain/back pain. Home Medication List    Details   gabapentin (NEURONTIN) 400 mg capsule Take 1 Cap by mouth two (2) times a day.  Indications: NEUROPATHIC PAIN  Qty: 30 Cap, Refills: 0    Associated Diagnoses: Iliopsoas muscle hematoma, right, subsequent encounter      cholecalciferol (VITAMIN D3) 1,000 unit tablet Take 2 Tabs by mouth daily. Indications: PREVENTION OF VITAMIN D DEFICIENCY  Qty: 30 Tab, Refills: 0      SITagliptin (JANUVIA) 50 mg tablet Take 50 mg by mouth daily. Indications: type 2 diabetes mellitus      potassium chloride (KLOR-CON M20) 20 mEq tablet Take 20 mEq by mouth two (2) times a day. Indications: HYPOKALEMIA PREVENTION      ondansetron (ZOFRAN ODT) 4 mg disintegrating tablet Take 4 mg by mouth every eight (8) hours as needed for Nausea. cyclobenzaprine (FLEXERIL) 10 mg tablet Take 10 mg by mouth as needed for Muscle Spasm(s). Indications: MUSCLE SPASM      carvedilol (COREG) 6.25 mg tablet Take 1 Tab by mouth two (2) times daily (with meals). Indications: hypertension  Qty: 30 Tab, Refills: 0    Associated Diagnoses: Benign hypertensive heart disease with systolic CHF, NYHA class 2 (HCC)      acetaminophen (TYLENOL) 325 mg tablet Take 2 Tabs by mouth every four (4) hours as needed (for fever or pain level less than 5/10). Indications: Fever, Pain  Qty: 30 Tab, Refills: 0    Associated Diagnoses: Iliopsoas muscle hematoma, right, subsequent encounter      allopurinol (ZYLOPRIM) 100 mg tablet Take 1 Tab by mouth daily. Indications: GOUT  Qty: 15 Tab, Refills: 0    Associated Diagnoses: Chronic gout, unspecified cause, unspecified site      insulin glargine (LANTUS) 100 unit/mL injection 15 Units by SubCUTAneous route nightly. Indications: type 2 diabetes mellitus  Qty: 1 Vial, Refills: 0    Associated Diagnoses: Type 2 diabetes mellitus with diabetic neuropathy, with long-term current use of insulin (formerly [facility])      docusate sodium (COLACE) 100 mg capsule Take 1 Cap by mouth daily (after breakfast). Indications: Constipation  Qty: 15 Cap, Refills: 0      senna-docusate (PERICOLACE) 8.6-50 mg per tablet Take 2 Tabs by mouth daily (after dinner).  Indications: Constipation  Qty: 30 Tab, Refills: 0      oxyCODONE-acetaminophen (PERCOCET 7.5) 7.5-325 mg per tablet Take 1 Tab by mouth every four (4) hours as needed (for pain level greater than 4/10). Max Daily Amount: 6 Tabs. Indications: Pain  Qty: 50 Tab, Refills: 0    Associated Diagnoses: Iliopsoas muscle hematoma, right, subsequent encounter      bumetanide (BUMEX) 1 mg tablet TAKE 1 TABLET TWICE A DAY  Qty: 180 Tab, Refills: 1      pravastatin (PRAVACHOL) 40 mg tablet Take 40 mg by mouth nightly. Indications: DYSLIPIDEMIA      ferrous sulfate 325 mg (65 mg iron) tablet Take 1 Tab by mouth two (2) times daily (with meals).   Qty: 30 Tab, Refills: 0      insulin aspart (NOVOLOG) 100 unit/mL injection INITIATE INSULIN CORRECTIVE PROTOCOL (HR): Normal Insulin Sensitivity  For Blood Sugar (mg/dL) of:    Less than 150 =   0 units          150 -199 =   2 units 200 -249 =   4 units 250 -299 =   6 units 300 -349 =   8 units 350 and above =   10 units If 2 glucose readings are above 200 mg/dL  Qty: 10 mL, Refills: 6             Current Facility-Administered Medications   Medication Dose Route Frequency    magnesium sulfate 1 g/100 ml IVPB (premix or compounded)  1 g IntraVENous ONCE    insulin glargine (LANTUS) injection 10 Units  10 Units SubCUTAneous DAILY    heparin (porcine) injection 5,000 Units  5,000 Units SubCUTAneous Q8H    acetaminophen (TYLENOL) suppository 650 mg  650 mg Rectal Q6H PRN    acetaminophen (TYLENOL) tablet 500 mg  500 mg Oral Q6H PRN    linezolid (ZYVOX) IVPB premix in D5W 600 mg  600 mg IntraVENous Q12H    diclofenac (VOLTAREN) 1 % topical gel 2 g  2 g Topical Q8H PRN    allopurinol (ZYLOPRIM) tablet 50 mg  50 mg Oral DAILY    aluminum-magnesium hydroxide (MAALOX) oral suspension 15 mL  15 mL Oral QID PRN    albuterol-ipratropium (DUO-NEB) 2.5 MG-0.5 MG/3 ML  3 mL Nebulization Q4HWA RT    mirtazapine (REMERON SOL-TAB) disintegrating tablet 15 mg  15 mg Oral QHS    Lactobacillus Acidoph & Bulgar (FLORANEX) tablet 1 Tab  1 Tab Oral BID    sodium chloride (NS) flush 5-10 mL  5-10 mL IntraVENous Q8H    naloxone (NARCAN) injection 0.1 mg 0.1 mg IntraVENous Multiple    acetaminophen (TYLENOL) tablet 650 mg  650 mg Oral Q4H PRN    famotidine (PEPCID) tablet 20 mg  20 mg Oral DAILY    insulin lispro (HUMALOG) injection   SubCUTAneous AC&HS    0.9% sodium chloride infusion 250 mL  250 mL IntraVENous PRN    cefTRIAXone (ROCEPHIN) 2 g in 0.9% sodium chloride (MBP/ADV) 50 mL MBP  2 g IntraVENous Q24H    diphenhydrAMINE (BENADRYL) capsule 50 mg  50 mg Oral Q4H PRN    polyethylene glycol (MIRALAX) packet 17 g  17 g Oral TID    cholecalciferol (VITAMIN D3) tablet 2,000 Units  2,000 Units Oral DAILY    docusate sodium (COLACE) capsule 100 mg  100 mg Oral DAILY AFTER BREAKFAST    oxyCODONE-acetaminophen (PERCOCET 7.5) 7.5-325 mg per tablet 1 Tab  1 Tab Oral Q4H PRN    pravastatin (PRAVACHOL) tablet 40 mg  40 mg Oral QHS    senna-docusate (PERICOLACE) 8.6-50 mg per tablet 2 Tab  2 Tab Oral PCD    folic acid (FOLVITE) tablet 1 mg  1 mg Oral DAILY    ondansetron (ZOFRAN) injection 4 mg  4 mg IntraVENous Q8H PRN    glucose chewable tablet 16 g  16 g Oral PRN    glucagon (GLUCAGEN) injection 1 mg  1 mg IntraMUSCular PRN    dextrose (D50W) injection syrg 12.5-25 g  25-50 mL IntraVENous PRN       Allergies: Vancomycin; Ampicillin; Bactrim [sulfamethoxazole-trimethoprim]; Blueberry; Ciprofloxacin; Codeine; Crestor [rosuvastatin]; Darvocet a500 [propoxyphene n-acetaminophen]; Demerol [meperidine]; Levaquin [levofloxacin]; Lipitor [atorvastatin]; Magnesium oxide; Minocin [minocycline]; Pcn [penicillins]; Pravachol [pravastatin]; Sulfa (sulfonamide antibiotics); Ultracet [tramadol-acetaminophen]; Vicodin [hydrocodone-acetaminophen];  Vytorin 10-10 [ezetimibe-simvastatin]; and Percodan [oxycodone hcl-oxycodone-asa]    Temp (24hrs), Av.3 °F (37.4 °C), Min:98.1 °F (36.7 °C), Max:100.7 °F (38.2 °C)    Visit Vitals    /41    Pulse 85    Temp (!) 100.7 °F (38.2 °C)    Resp 13    Ht 5' 7\" (1.702 m)    Wt 119.6 kg (263 lb 10.7 oz)    SpO2 99%    Breastfeeding No    BMI 41.3 kg/m2       ROS: patient unable to communicate fluently. Detailed ros not feasible. Physical Exam:    General: Well developed, well nourished female laying on the bed, sleepy, NAD    General:   awake alert and oriented   HEENT:  Normocephalic, atraumatic, PERRL, EOMI, no scleral icterus or pallor; no conjunctival hemmohage;  nasal and oral mucous are moist and without evidence of lesions. Neck supple, no bruits. Lymph Nodes:   no cervical, axillary or inguinal adenopathy   Lungs:   non-labored, bilaterally clear to auscultation- no crackles wheezes rales or rhonchi   Heart:   s1 and s2 irregular; no rubs or gallops, no edema, + pedal pulses   Abdomen:  soft, non-distended, active bowel sounds, no hepatomegaly, no splenomegaly. no tenderness over the left abdominal pacemaker, no overlying erythema or fluctuance, new drain right lower abdomen   Genitourinary:  deferred   Extremities:   no clubbing, cyanosis; no joint effusions or swelling; muscle mass appropriate for age   Neurologic:  No gross focal sensory abnormalities; 5/5 muscle strength to upper extremities. 0/5 strength in lower extremities.   Cranial nerves intact                        Skin:  Surgical scars abdomen, left neck well healed   Back:   no paraspinal muscle guarding or rigidity, right CVA tenderness     Psychiatric:  No suicidal or homicidal ideations, appropriate mood and affect         Labs: Results:   Chemistry Recent Labs      05/15/17   0604 05/14/17   1630  05/13/17   0545   GLU  109*  132*  144*   NA  136  135*  135*   K  4.0  4.0  3.6   CL  102  100  100   CO2  25  26  27   BUN  30*  31*  24*   CREA  2.98*  3.26*  2.68*   CA  8.1*  8.2*  8.0*   AGAP  9  9  8   BUCR  10*  10*  9*   AP   --   298*   --    TP   --   5.9*   --    ALB   --   1.3*   --    GLOB   --   4.6*   --    AGRAT   --   0.3*   --       CBC w/Diff Recent Labs      05/15/17   0604  05/14/17   1630  05/13/17   0545   WBC  10.6  11.5  12.8 RBC  2.67*  2.85*  2.80*   HGB  7.1*  7.6*  7.3*   HCT  21.5*  23.2*  22.7*   PLT  233  237  215   GRANS  73  72  76*   LYMPH  14*  16*  15*   EOS  3  3  2      Microbiology Recent Labs      05/14/17   0455  05/14/17   0432   CULT  NO GROWTH 1 DAY  CULTURE IN PROGRESS,FURTHER UPDATES TO FOLLOW          RADIOLOGY:    All available imaging studies/reports in [facility] care for this admission were reviewed    Dr. Shalini Jensen, Infectious Disease Specialist  [phone]  May 15, 2017  3:49 PM

## 2017-05-15 NOTE — ROUTINE PROCESS
Bedside, Verbal and Written shift change report given to Regina Truong RN (oncoming nurse) by Cyrus Nelson RN   (offgoing nurse). Report included the following information SBAR, Kardex, Intake/Output, MAR, Recent Results and Cardiac Rhythm NSR.

## 2017-05-15 NOTE — ROUTINE PROCESS
TRANSFER - IN REPORT:    Verbal report received from [facility], RN (name) on Warden Parks  being received from [address] (unit) for change in patient condition(hyptension). Report consisted of patients Situation, Background, Assessment and   Recommendations(SBAR). Information from the following report(s) SBAR, Kardex, Intake/Output, MAR and Recent Results was reviewed with the receiving nurse. Opportunity for questions and clarification was provided. Assessment completed upon patients arrival to unit and care assumed. See Doc Flowsheets for detailed assessment results.

## 2017-05-15 NOTE — ROUTINE PROCESS
Patient's look very septic, very drowsy hard to wake-up keep falling back to sleep. Running low grade fever T-max 100.7. Jeannette Ferrist SRVAANTHI Bhatia, made aware. Not able to give Tylenol to drowsy to wake-up afraid that patient might aspirate. Had huge soft formed BM early this morning. Had one upon transfer to the unit huge size per day nurse. [place] was held night.

## 2017-05-15 NOTE — PROGRESS NOTES
Problem: Mobility Impaired (Adult and Pediatric)  Goal: *Acute Goals and Plan of Care (Insert Text)  STGs to be addressed within 3 days:  1. Bed mobility: Supine to sit to supine MaxAx1 with HR for meals. 2. Activity tolerance: Tolerate EOB > 15 minutes for ADLs. 3. Transfers: SPT-->to chair max/mod A with LRAD for ADLs. 4. Pt demo fair seated balance in preparation for functional transfers. LTGs to be addressed within 7 days:  1. Transfers: SB-->to chair/wc max/mod A for ADLs. 2. Activity tolerance: Tolerated > 1 hr in chair for change of position. 3. Patient Education: Independent with HEP for home safety. Patient has been transferred to ICU for AMS and hypotension. Spoke with critical care PA Rosario Max, who cleared patient to resume PT. Attempted PT treatment, patient extremely lethargic, unable to participate with PT. Will hold PT for today and reassess tomorrow.

## 2017-05-15 NOTE — PROGRESS NOTES
NUTRITION    Nutrition Consult: General Nutrition Management & Supplements      RECOMMENDATIONS / PLAN:     - Evaluate ability to tolerate po and resume diet as medically appropriate.   - Continue RD inpatient monitoring and evaluation. NUTRITION INTERVENTIONS & DIAGNOSIS:     [x] Vitamin/mineral supplementation: ergocalciferol, folic acid  [x] Nutrition related medication management: Remeron     Nutrition Diagnosis: Inadequate oral intake related to decreased appetite and altered mentation as evidenced by poor meal intake PTA and since admission, now NPO. ASSESSMENT:     Subjective/Objective: Poor po intake since admission. Transferred to ICU for altered mentation and hypotension, unable to tolerate po at this time and made NPO this morning.        Average po intake adequate to meet patients estimated nutritional needs:   [] Yes     [x] No   [] Unable to determine at this time    Diet:      NPO  Food Allergies: blueberry   Current Appetite:   [] Good     [] Fair     [] Poor     [x] Other:  NPO  Appetite/meal intake prior to admission:   [] Good     [] Fair     [x] Poor   [] Other:  Feeding Limitations:  [] Swallowing difficulty    [] Chewing difficulty    [x] Other: mentation   Current Meal Intake:   Patient Vitals for the past 100 hrs:   % Diet Eaten   05/14/17 1343 25 %   05/14/17 0919 0 %   05/13/17 1849 0 %   05/13/17 1420 20 %   05/12/17 1429 25 %   05/11/17 1817 0 %   05/11/17 1300 50 %   05/11/17 0900 40 %     BM: 5/14  Skin Integrity:  Callus on right foot   Edema:  2+ LEs, UEs, sacral, genital, generalized   Pertinent Medications: Reviewed: bowel regimen, zofran, maalox     Recent Labs      05/15/17   0604  05/14/17   1630  05/13/17   0545   NA  136  135*  135*   K  4.0  4.0  3.6   CL  102  100  100   CO2  25  26  27   GLU  109*  132*  144*   BUN  30*  31*  24*   CREA  2.98*  3.26*  2.68*   CA  8.1*  8.2*  8.0*   MG  1.8   --    --    PHOS  4.0   --    --    ALB   --   1.3*   --    SGOT   --   83* --    ALT   --   69*   --        Intake/Output Summary (Last 24 hours) at 05/15/17 1059  Last data filed at 05/15/17 0600   Gross per 24 hour   Intake          2926.25 ml   Output             1265 ml   Net          1661.25 ml       Anthropometrics:  Ht Readings from Last 1 Encounters:   05/14/17 5' 7\" (1.702 m)     Last 3 Recorded Weights in this Encounter    05/12/17 0630 05/13/17 0613 05/14/17 0542   Weight: 117.6 kg (259 lb 3.2 oz) 117.8 kg (259 lb 11.2 oz) 119.6 kg (263 lb 10.7 oz)     Body mass index is 41.3 kg/(m^2). Obese, Class III           Weight History: Per chart review, patient reported usual weight of 240 lb       Weight Metrics 5/14/2017 4/20/2017 4/6/2017 4/6/2017 3/30/2017 3/16/2017 3/14/2017   Weight 263 lb 10.7 oz - 233 lb 227 lb 1.6 oz - 240 lb 243 lb   BMI - 41.3 kg/m2 36.49 kg/m2 - 35.57 kg/m2 37.59 kg/m2 38.06 kg/m2        Admitting Diagnosis: Acute paraplegia  Acute paraplegia [facility])  Past Medical History:   Diagnosis Date    Acute paraplegia ([facility] [address] 4/20/2017    Benign hypertensive heart disease with systolic CHF, NYHA class 2 ([facility] [address] 9/5/2012    Biventricular implantable cardioverter-defibrillator in situ 04/28/2005    Upgraded to BiV AICD; gen change 4/2008; pocket revision 10/2009; Abdominal - done on 8/22/2012 by Dr. Daisy Hensley Cardiac cath 08/15/1996    Patent coronaries. Elev LVEDP. EF 50-55%.  Cardiac echocardiogram 06/23/2015    Ltd study. EF 45-50%. Mild, diffuse hypk. Severe apical hypk. No mass or thrombus was clearly identified, although imaging was suboptimal.      Cardiac nuclear imaging test 06/19/2015    Fixed distal apical, distal septal defect more likely due to RV pacing than prior infarct. No ischemia. EF 46%. RWMA c/w RV pacing. Nondiagnostic EKG on pharm stress test.      Cardiovascular lower extremity venous duplex 09/04/2012    Acute, non-occlusive DVT in CFV on right. No DVT on left. No superficial thrombosis bilaterally.    [facility] Cardiovascular upper extremity venous duplex 08/27/2012    DVT in axillary vein on left. Left subclavian was not visualized.  Chronic anemia 9/5/2012    Chronic systolic heart failure (HCC)     Decreased calculated glomerular filtration rate (GFR) 3/30/2017    Calculated GFR equivalent to that of CKD stage 3 = 30-59 ml/min    Diabetic neuropathy associated with type 2 diabetes mellitus ([facility] [address] 6/28/2011    Difficult airway for intubation 08/22/2012    see anesthesia airway note    Dyslipidemia 6/28/2011    Gout     History of complete heart block 6/28/2011    History of Coumadin therapy     Anticoagulation for DVT of the LUE; Discontinued on 3/30/2017    History of deep venous thrombosis 9/5/2012    Left upper extremity    History of pyelonephritis 3/30/2017    Left bundle branch block (LBBB) on electrocardiogram 6/28/2011    Nonischemic cardiomyopathy ([facility] [address] 6/28/2011    Obesity (BMI 35.0-39.9 without comorbidity) ([facility] [address] 3/13/2017    Obstructive sleep apnea on CPAP 2/7/2012    Psoas abscess, right ([address] 4/20/2017    Psoas hematoma, right, secondary to anticoagulant therapy 3/30/2017    Type 2 diabetes mellitus with diabetic neuropathy ([facility] [address] 6/28/2011       Education Needs:        [x] None identified  [] Identified - Not appropriate at this time  []  Identified and addressed - refer to education log  Learning Limitations:   [] None identified  [x] Identified    Cultural, [employer] & ethnic food preferences:  [x] None identified    [] Identified and addressed     ESTIMATED NUTRITION NEEDS:     Calories: 9117-3258 kcal (MSJx1.2-1.3) based on  [x] Actual BW: 111 kg      [] IBW   CHO: 252-273 gm (50% kcal)   Protein:  gm (0.8-1 gm/kg) based on  [x] Actual BW      [] IBW   Fluid: 1 mL/kcal     MONITORING & EVALUATION:     Nutrition Goal(s): goals revised   1. Nutritional needs will be met through adequate oral intake or nutrition support within the next 7 days.   Outcome:  [] Met/Ongoing    []  Not Met    [x] New/Initial Goal     Monitoring:   [] Diet tolerance   [] Meal intake   [] Supplement intake   [x] GI symptoms/ability to tolerate po diet   [] Respiratory status   [] Plan of care      Previous Recommendations (for follow-up assessments only):     [x]   Implemented       []   Not Implemented (RD to address)     [] No Recommendation Made     Discharge Planning: diabetic, cardiac diet as tolerated   [x] Participated in care planning, discharge planning, & interdisciplinary rounds as appropriate      Jose A Tilley, [address] , [address] [place] [address]   Pager: [phone]

## 2017-05-15 NOTE — PROGRESS NOTES
RENAL DAILY PROGRESS NOTE    Patient: Solo Arora               Sex: female          DOA: 4/20/2017  3:36 PM        YOB: 1950      Age:  77 y.o.        LOS:  LOS: 25 days     Subjective:     Solo Arora is a 77 y.o.  who presents with Acute paraplegia  Acute paraplegia ([facility] [address] Asked to evaluate for acute/crf stage 3.admitted with paraplegias,psoas muscle hematoma,s/p drain placement. hx of dm,gout  Chief complains: Patient denies nausea, vomiting, chest pain, dizziness, shortness of breath or headache.  - Reviewed last 24 hrs events     Current Facility-Administered Medications   Medication Dose Route Frequency    fluconazole (DIFLUCAN) 400mg/200 mL IVPB (premix)  400 mg IntraVENous ONCE    [START ON 5/16/2017] fluconazole (DIFLUCAN) 200mg/100 mL IVPB (premix)  200 mg IntraVENous Q24H    albumin human 25% (BUMINATE) solution 25 g  25 g IntraVENous Q6H    meropenem (MERREM) 500 mg in 0.9% sodium chloride (MBP/ADV) 50 mL MBP  0.5 g IntraVENous Q12H    acetaminophen (TYLENOL) suppository 650 mg  650 mg Rectal Q6H PRN    acetaminophen (TYLENOL) tablet 500 mg  500 mg Oral Q6H PRN    linezolid (ZYVOX) IVPB premix in D5W 600 mg  600 mg IntraVENous Q12H    diclofenac (VOLTAREN) 1 % topical gel 2 g  2 g Topical Q8H PRN    allopurinol (ZYLOPRIM) tablet 50 mg  50 mg Oral DAILY    aluminum-magnesium hydroxide (MAALOX) oral suspension 15 mL  15 mL Oral QID PRN    albuterol-ipratropium (DUO-NEB) 2.5 MG-0.5 MG/3 ML  3 mL Nebulization Q4HWA RT    mirtazapine (REMERON SOL-TAB) disintegrating tablet 15 mg  15 mg Oral QHS    Lactobacillus Acidoph & Bulgar (FLORANEX) tablet 1 Tab  1 Tab Oral BID    sodium chloride (NS) flush 5-10 mL  5-10 mL IntraVENous Q8H    naloxone (NARCAN) injection 0.1 mg  0.1 mg IntraVENous Multiple    acetaminophen (TYLENOL) tablet 650 mg  650 mg Oral Q4H PRN    famotidine (PEPCID) tablet 20 mg  20 mg Oral DAILY    insulin lispro (HUMALOG) injection SubCUTAneous AC&HS    0.9% sodium chloride infusion 250 mL  250 mL IntraVENous PRN    diphenhydrAMINE (BENADRYL) capsule 50 mg  50 mg Oral Q4H PRN    polyethylene glycol (MIRALAX) packet 17 g  17 g Oral TID    cholecalciferol (VITAMIN D3) tablet 2,000 Units  2,000 Units Oral DAILY    docusate sodium (COLACE) capsule 100 mg  100 mg Oral DAILY AFTER BREAKFAST    oxyCODONE-acetaminophen (PERCOCET 7.5) 7.5-325 mg per tablet 1 Tab  1 Tab Oral Q4H PRN    pravastatin (PRAVACHOL) tablet 40 mg  40 mg Oral QHS    senna-docusate (PERICOLACE) 8.6-50 mg per tablet 2 Tab  2 Tab Oral PCD    folic acid (FOLVITE) tablet 1 mg  1 mg Oral DAILY    ondansetron (ZOFRAN) injection 4 mg  4 mg IntraVENous Q8H PRN    glucose chewable tablet 16 g  16 g Oral PRN    glucagon (GLUCAGEN) injection 1 mg  1 mg IntraMUSCular PRN    dextrose (D50W) injection syrg 12.5-25 g  25-50 mL IntraVENous PRN       Objective:     Visit Vitals    /40    Pulse 87    Temp 98.9 °F (37.2 °C)    Resp 12    Ht 5' 7\" (1.702 m)    Wt 119.6 kg (263 lb 10.7 oz)    SpO2 98%    Breastfeeding No    BMI 41.3 kg/m2       Intake/Output Summary (Last 24 hours) at 05/15/17 1132  Last data filed at 05/15/17 0600   Gross per 24 hour   Intake          2926.25 ml   Output             1265 ml   Net          1661.25 ml       Physical Examination:     GEN: AAO X 3, NAD  RS: Chest is bilateral equal, no wheezing / rales / crackles  CVS: S1-S2 heard, RRR, No S3 / murmur  Abdomen: Soft, Non tender, Not distended, Positive bowel sounds, no organomegaly, no CVA / supra pubic tenderness  Extremities: + edema, no cyanosis, skin is warm on touch  CNS: Awake & follows commands, CN II-XII are grossly intact. HEENT: Head is atraumatic, PERRLA, conjunctiva pink & non icteric.  No JVD or carotid bruit   Psychiatric: Normal mood, affect, judgement & memory    Musculoskeletal: No gross joints or bone deformities   Lymph Node: No palpable cervical, axillary or groin lymphadenopathy. Data Review:      Labs:     Hematology:   Recent Labs      05/15/17   0604  05/14/17   1630  05/13/17   0545   WBC  10.6  11.5  12.8   HGB  7.1*  7.6*  7.3*   HCT  21.5*  23.2*  22.7*     Chemistry:   Recent Labs      05/15/17   0604  05/14/17   1630  05/13/17   0545   BUN  30*  31*  24*   CREA  2.98*  3.26*  2.68*   CA  8.1*  8.2*  8.0*   ALB   --   1.3*   --    K  4.0  4.0  3.6   NA  136  135*  135*   CL  102  100  100   CO2  25  26  27   PHOS  4.0   --    --    GLU  109*  132*  144*        Images:    XR (Most Recent). CXR reviewed by me and compared with previous CXR   Results from Hospital Encounter encounter on 04/20/17   XR CHEST PORT   Narrative CHEST PORTABLE    CPT CODE: 15870    COMPARISON: 5/13/2017    INDICATIONS: Increased shortness of breath and worsening cough    FINDINGS: There is a right-sided PICC line with its tip overlying the right  subclavian vein. The heart is normal in size. Status post median sternotomy. Diffuse right lung infiltrates are again seen not significantly changed. Probable right pleural effusion. Impression Impression:    Diffuse right lung infiltrates not significantly changed in the one-day  interval. Probable right pleural effusion. PICC line with its tip in the right subclavian vein. CT (Most Recent)   Results from Hospital Encounter encounter on 04/20/17   CT DRAIN ABS W CATH PERC   Narrative PREOPERATIVE DIAGNOSIS: Right psoas muscle abscess. POSTOPERATIVE DIAGNOSIS: Same    INDICATION: Residual right psoas muscle fluid collection. ATTENDING: JOANN [employer]: None. PROCEDURES:  1. New CT-guided drainage of a small right psoas muscle residual fluid  collection and drainage catheter placement.   2. Old right psoas muscle drainage catheter removal.    ANESTHESIA: Local 1% lidocaine as well as moderate intravenous sedation with  Versed and fentanyl given and monitored per independently trained interventional  radiology nurse under my direct supervision for 30 minutes. Please see detailed  nursing records for medication dosing. CONTRAST: None. COMPLICATIONS: None    DRAIN: No    CATHETER: None. EBL: None. SPECIMEN: None. TECHNIQUE:     All CT scans at this facility are performed using dose optimization technique as  appropriate to a performed exam, to include automated exposure control,  adjustment of the mA and/or kV according to patient size (including appropriate  matching for site-specific examinations), or use of iterative reconstruction  technique. The risks, benefits, and alternatives were discussed. Written and verbal consent  obtained. Patient was placed supine on CT table and the right lower quadrant  region was prepped and draped in usual sterile fashion. Maximum sterile barrier  technique was used. Timeout was performed. 1% lidocaine was then used. Preliminary CT imaging of the lower thoracic and lumbar spine was obtained. Under direct CT fluoroscopy guidance using 18-gauge trocar needle was advanced  down into collection. J-wire was advanced through the needle. Serial dilatations  were performed. 8.5 [employer] all-purpose drainage catheter was placed into  collection under direct CT fluoroscopy. Distal coil was formed. Catheter was  flushed with normal saline and attached to bulb suction. External tubing was  affixed to the skin with 0 proline. Sterile dressing was applied. Old right psoas muscle drainage catheter has been removed without difficulty and  discarded. Sterile dressing was applied. Patient tolerated procedure fairly well. There were no immediate complications. Patient was transferred to recovery in stable condition. Dr. Chad Morataya was  present and performed the procedure. FINDINGS: Initial imaging demonstrated very minimal residual right psoas muscle  fluid collection extending inferiorly.  Significant reduction of the size of the  right psoas muscle is demonstrated. Previously placed drainage catheter has been  minimally pulled back. Therefore old drainage catheter was removed given its small position. New drainage catheter was placed as described above. .    CT fluoroscopic guidance demonstrated good position of the access needle as well  as new drainage catheter. Impression IMPRESSION:      1. Successful, uncomplicated new CT-guided right psoas muscle fluid collection  drainage and drainage catheter placement. 2. Successful uncomplicated removal of the old right psoas muscle drainage  catheter. PLAN: Drainage catheter should be attached to bulb suction. Outputs should be  monitored Q shift. Catheter should be flushed in the antegrade fashion with 10  cc of sterile normal saline 3 times a day. EKG Results for orders placed or performed in visit on 11/01/16   AMB POC EKG ROUTINE W/ 12 LEADS, INTER & REP     Status: None    Narrative    AV sequentially paced rhythm with a rate of 67. I have personally reviewed the old medical records and patient's labs    Plan / Recommendation:      1. Acute/crf stage 3.neurogenic bladder. sepsis. improved with ivf ,total 2 liters. continue ivf.start albumin   2.anemia,consider transfusion  3. infected psoas muscle hematoma,s/p drain placement,  3.uti,pneumonia  4.adjusted meds for renal failure    D/w Dr. Leni Quinonez MD  Nephrology  5/15/2017

## 2017-05-15 NOTE — ROUTINE PROCESS
Bedside and Verbal shift change report given to JUAN FRANCISCO Bains (oncoming nurse) by Missael Batres RN (offgoing nurse). Report included the following information SBAR, Kardex, MAR and Recent Results. SITUATION:    Code Status: Full Code   Reason for Admission: Acute paraplegia   Acute paraplegia [facility] day: 25   Problem List:       Hospital Problems  Date Reviewed: 4/20/2017          Codes Class Noted POA    * (Principal)Acute paraplegia ([facility] [address] ICD-10-CM: G82.20  ICD-9-CM: 344.1  4/20/2017 Yes        Sepsis ([facility] [address] ICD-10-CM: A41.9  ICD-9-CM: 038.9, 995.91  4/20/2017 Yes        Psoas abscess, right ([facility] [address] ICD-10-CM: L86.27  ICD-9-CM: 567.31  4/20/2017 Yes        Krueger catheter in place on admission ICD-10-CM: Z96.0  ICD-9-CM: V45.89  4/20/2017 Yes        Urinary tract infection due to Enterococcus ICD-10-CM: N39.0, B95.2  ICD-9-CM: 599.0, 041.04  4/20/2017 Yes        Group B streptococcal infection ICD-10-CM: A49.1  ICD-9-CM: 041.02  4/20/2017 Yes        History of Coumadin therapy ICD-10-CM: Z79.01  ICD-9-CM: V58.61  Unknown Yes    Overview Signed 4/6/2017 10:35 PM by Kristine Felipe MD     Anticoagulation for chronic atrial fibrillation; Discontinued on 3/30/2017             Decreased calculated glomerular filtration rate (GFR) ICD-10-CM: R94.4  ICD-9-CM: 794.4  3/30/2017 Yes    Overview Signed 4/6/2017  5:47 PM by Kristine Felipe MD     Calculated GFR equivalent to that of CKD stage 3 = 30-59 ml/min             Iliopsoas muscle hematoma ICD-10-CM: S70.10XA  ICD-9-CM: 924.00  3/30/2017 Yes        Psoas hematoma, right, secondary to anticoagulant therapy ICD-10-CM: S30. 1XXA  ICD-9-CM: 924.9, E934.2  3/30/2017 Yes        Impaired mobility and ADLs ICD-10-CM: Z74.09  ICD-9-CM: 799.89  3/30/2017 Yes        Benign hypertensive heart disease with systolic CHF, NYHA class 2 (HCC) (Chronic) ICD-10-CM: I11.0, I50.20  ICD-9-CM: 402.11, 428.20, 428.0  9/5/2012 Yes        AICD generator infection ([facility] [address] ICD-10-CM: T82. 7XXA  ICD-9-CM: 996.61  8/20/2012 Yes    Overview Signed 8/20/2012  6:13 PM by Odell Huang MD     S/p explant 4 leads 8/20/12             Type 2 diabetes mellitus with diabetic neuropathy (HCC) (Chronic) ICD-10-CM: E11.40  ICD-9-CM: 250.60, 357.2  6/28/2011 Yes              BACKGROUND:    Past Medical History:   Past Medical History:   Diagnosis Date    Acute paraplegia ([facility] [address] 4/20/2017    Benign hypertensive heart disease with systolic CHF, NYHA class 2 ([address] 9/5/2012    Biventricular implantable cardioverter-defibrillator in situ 04/28/2005    Upgraded to BiV AICD; gen change 4/2008; pocket revision 10/2009; Abdominal - done on 8/22/2012 by Dr. Hakan Sagastume Cardiac cath 08/15/1996    Patent coronaries. Elev LVEDP. EF 50-55%.  Cardiac echocardiogram 06/23/2015    Ltd study. EF 45-50%. Mild, diffuse hypk. Severe apical hypk. No mass or thrombus was clearly identified, although imaging was suboptimal.      Cardiac nuclear imaging test 06/19/2015    Fixed distal apical, distal septal defect more likely due to RV pacing than prior infarct. No ischemia. EF 46%. RWMA c/w RV pacing. Nondiagnostic EKG on pharm stress test.      Cardiovascular lower extremity venous duplex 09/04/2012    Acute, non-occlusive DVT in CFV on right. No DVT on left. No superficial thrombosis bilaterally.  Cardiovascular upper extremity venous duplex 08/27/2012    DVT in axillary vein on left. Left subclavian was not visualized.     Chronic anemia 9/5/2012    Chronic systolic heart failure (HCC)     Decreased calculated glomerular filtration rate (GFR) 3/30/2017    Calculated GFR equivalent to that of CKD stage 3 = 30-59 ml/min    Diabetic neuropathy associated with type 2 diabetes mellitus ([facility] [address] 6/28/2011    Difficult airway for intubation 08/22/2012    see anesthesia airway note    Dyslipidemia 6/28/2011    Gout     History of complete heart block 6/28/2011    History of Coumadin therapy Anticoagulation for DVT of the LUE; Discontinued on 3/30/2017    History of deep venous thrombosis 9/5/2012    Left upper extremity    History of pyelonephritis 3/30/2017    Left bundle branch block (LBBB) on electrocardiogram 6/28/2011    Nonischemic cardiomyopathy ([facility] [address] 6/28/2011    Obesity (BMI 35.0-39.9 without comorbidity) ([facility] [address] 3/13/2017    Obstructive sleep apnea on CPAP 2/7/2012    Psoas abscess, right ([facility] [address] 4/20/2017    Psoas hematoma, right, secondary to anticoagulant therapy 3/30/2017    Type 2 diabetes mellitus with diabetic neuropathy ([facility] [address] 6/28/2011         Patient taking anticoagulants: No     ASSESSMENT:    Changes in Assessment Throughout Shift: Lethargic throughout shift. NPO secondary to lethargy. Evaluated for right ultrasound guided thoracentesis; however, upon ultrasound evaluation, not enough fluid present to drain. Abdominal ultrasound done to evaluate liver and gallbladder. Blood cultures x 2 done - 1 set via PICC & 1 set peripherally. Aerobic blood culture drawn 5-14-17 @ 0455 growing GPC in groups. Bedside echocardiogram done. Krueger catheter discontinued. Phenylephrine drip started to maintain MAP > 65 mmHg.      Patient has Central Line: Yes - Reasons if yes: Poor venous access; Vasopressor     Patient has Krueger Cath: No     Last Vitals:     Vitals:    05/15/17 1730 05/15/17 1800 05/15/17 1830 05/15/17 1900   BP: 142/71 114/41 (!) 101/38 116/44   Pulse: (!) 103 (!) 101 100 97   Resp: 20 11 11 14   Temp:       TempSrc:       SpO2: 99% 99% 96% 98%   Weight:       Height:            IV and DRAINS (will only show if present)   Drain 8.5 FR Right psoas muscle drain 05/10/17 Right Other (comment)-Site Assessment: Clean, dry, & intact  [REMOVED] Drain 04/20/17 Right Other (comment)-Site Assessment: Clean, dry, & intact  [REMOVED] Peripheral IV 04/20/17 Left Arm-Site Assessment: Clean, dry, & intact  [REMOVED] Peripheral IV 04/20/17 Right Antecubital-Site Assessment: Clean, dry, & intact  [REMOVED] Triple Lumen Triple Lumen CVL 04/20/17 Left Other(comment)-Site Assessment: Clean, dry, & intact  [REMOVED] Peripheral IV 04/21/17 Left Arm-Site Assessment: Clean, dry, & intact  [REMOVED] Peripheral IV 04/25/17 Left Forearm-Site Assessment: Clean, dry, & intact  PICC Double Lumen 70/54/37 Right;Basilic-Site Assessment: Clean, dry, & intact     WOUND (if present)   Wound Type: Groin / linnette excoriation   Dressing present Dressing Present : Yes, Intact, not due to be changed   Wound Concerns/Notes:  none     PAIN    Pain Assessment    Pain Intensity 1: 7 (05/15/17 1731)    Pain Location 1: Generalized    Pain Intervention(s) 1: Medication (see MAR) (PRN Percocet)    Patient Stated Pain Goal: 0  o Interventions for Pain:  PRN Percocet (See MAR)  o Intervention effective: yes  o Time of last intervention: 0171   o Reassessment Completed: yes      Last 3 Weights:  Last 3 Recorded Weights in this Encounter    05/12/17 0630 05/13/17 0613 05/14/17 0542   Weight: 117.6 kg (259 lb 3.2 oz) 117.8 kg (259 lb 11.2 oz) 119.6 kg (263 lb 10.7 oz)     Weight change:      INTAKE/OUPUT    Current Shift:      Last three shifts: 05/14 0701 - 05/15 1900  In: 4492.1 [P.O.:360; I.V.:4112.1]  Out: 7870 [Urine:2630; Drains:17]     LAB RESULTS     Recent Labs      05/15/17   0604  05/14/17   1630  05/13/17   0545   WBC  10.6  11.5  12.8   HGB  7.1*  7.6*  7.3*   HCT  21.5*  23.2*  22.7*   PLT  233  237  215        Recent Labs      05/15/17   1446  05/15/17   0604  05/14/17   1630  05/13/17   0545   NA   --   136  135*  135*   K   --   4.0  4.0  3.6   GLU   --   109*  132*  144*   BUN   --   30*  31*  24*   CREA   --   2.98*  3.26*  2.68*   CA   --   8.1*  8.2*  8.0*   MG   --   1.8   --    --    INR  1.2   --    --    --        RECOMMENDATIONS AND DISCHARGE PLANNING     1. Pending tests/procedures/ Plan of Care or Other Needs: Straight cath every 6 hr; Titrate phenylephrine drip to maintain MAP > 65 mmHg     2.  Discharge plan for patient and Needs/Barriers: TBD    3. Estimated Discharge Date: TBD; Posted on Whiteboard in Patients Room: Yes      4. The patient's care plan was reviewed with the oncoming nurse. \"HEALS\" SAFETY CHECK      Fall Risk    Total Score: 4    Safety Measures: Safety Measures: Bed/Chair-Wheels locked, Bed in low position, Call light within reach, Emergency bedside equipment, Fall prevention (comment), Nurse at bedside, Side rails X 3    A safety check occurred in the patient's room between off going nurse and oncoming nurse listed above. The safety check included the below items  Area Items   H  High Alert Medications - Verify all high alert medication drips (heparin, PCA, etc.)   E  Equipment - Suction is set up for ALL patients (with vania)  - Red plugs utilized for all equipment (IV pumps, etc.)  - WOWs wiped down at end of shift.  - Room stocked with oxygen, suction, and other unit-specific supplies   A  Alarms - Bed alarm is set for fall risk patients  - Ensure chair alarm is in place and activated if patient is up in a chair   L  Lines - Check IV for any infiltration  - Krueger bag is empty if patient has a Krueger   - Tubing and IV bags are labeled   S  Safety   - Room is clean, patient is clean, and equipment is clean. - Hallways are clear from equipment besides carts. - Fall bracelet on for fall risk patients  - Ensure room is clear and free of clutter  - Suction is set up for ALL patients (with vania)  - Hallways are clear from equipment besides carts.    - Isolation precautions followed, supplies available outside room, sign posted     Regina Roman RN

## 2017-05-15 NOTE — PROGRESS NOTES
attended the interdisciplinary rounds for Nidia Mendez, who is a 77 y.o.,female. Patients Primary Language is: [place]. According to the patients EMR Church Affiliation is: [place]. The reason the Patient came to the hospital is:   Patient Active Problem List    Diagnosis Date Noted    Acute paraplegia ([address] 04/20/2017    Sepsis ([address] 04/20/2017    Psoas abscess, right ([address] 04/20/2017    Krueger catheter in place on admission 04/20/2017    Urinary tract infection due to Enterococcus 04/20/2017    Group B streptococcal infection 04/20/2017    Gout     History of Coumadin therapy     Decreased calculated glomerular filtration rate (GFR) 03/30/2017    History of pyelonephritis 03/30/2017    Iliopsoas muscle hematoma 03/30/2017    Psoas hematoma, right, secondary to anticoagulant therapy 03/30/2017    Impaired mobility and ADLs 03/30/2017    Obesity (BMI 35.0-39.9 without comorbidity) ([address] 03/13/2017    Chronic systolic heart failure ([address]     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    Benign hypertensive heart disease with systolic CHF, NYHA class 2 ([address] 09/05/2012    Chronic anemia 09/05/2012    History of deep venous thrombosis 09/05/2012    Anticoagulated on Coumadin 09/05/2012    Difficult airway for intubation 08/22/2012    AICD generator infection ([address] 08/20/2012    Obstructive sleep apnea on CPAP 02/07/2012    Nonischemic cardiomyopathy ([address] 06/28/2011    History of complete heart block 06/28/2011    Left bundle branch block (LBBB) on electrocardiogram 06/28/2011    Type 2 diabetes mellitus with diabetic neuropathy ([address] 06/28/2011    Dyslipidemia 06/28/2011    Diabetic neuropathy associated with type 2 diabetes mellitus ([address] 06/28/2011    Biventricular implantable cardioverter-defibrillator in situ 04/28/2005      Plan:  Chaplains will continue to follow and will provide pastoral care on an as needed/requested basis.     [address] [facility]  [address]   [phone]

## 2017-05-16 ENCOUNTER — APPOINTMENT (OUTPATIENT)
Dept: CT IMAGING | Age: 67
DRG: 853 | End: 2017-05-16
Attending: INTERNAL MEDICINE
Payer: COMMERCIAL

## 2017-05-16 ENCOUNTER — APPOINTMENT (OUTPATIENT)
Dept: GENERAL RADIOLOGY | Age: 67
DRG: 853 | End: 2017-05-16
Attending: NURSE PRACTITIONER
Payer: COMMERCIAL

## 2017-05-16 LAB
ALBUMIN SERPL BCP-MCNC: 2.3 G/DL (ref 3.4–5)
ALBUMIN/GLOB SERPL: 0.6 {RATIO} (ref 0.8–1.7)
ALP SERPL-CCNC: 296 U/L (ref 45–117)
ALT SERPL-CCNC: 44 U/L (ref 13–56)
ANION GAP BLD CALC-SCNC: 7 MMOL/L (ref 3–18)
AST SERPL W P-5'-P-CCNC: 50 U/L (ref 15–37)
BACTERIA SPEC CULT: NORMAL
BACTERIA SPEC CULT: NORMAL
BASOPHILS # BLD AUTO: 0.1 K/UL (ref 0–0.06)
BASOPHILS # BLD: 1 % (ref 0–3)
BILIRUB DIRECT SERPL-MCNC: 0.3 MG/DL (ref 0–0.2)
BILIRUB SERPL-MCNC: 0.6 MG/DL (ref 0.2–1)
BUN SERPL-MCNC: 28 MG/DL (ref 7–18)
BUN/CREAT SERPL: 10 (ref 12–20)
CALCIUM SERPL-MCNC: 8.8 MG/DL (ref 8.5–10.1)
CHLORIDE SERPL-SCNC: 104 MMOL/L (ref 100–108)
CO2 SERPL-SCNC: 26 MMOL/L (ref 21–32)
CREAT SERPL-MCNC: 2.88 MG/DL (ref 0.6–1.3)
DIFFERENTIAL METHOD BLD: ABNORMAL
EOSINOPHIL # BLD: 0.1 K/UL (ref 0–0.4)
EOSINOPHIL NFR BLD: 1 % (ref 0–5)
ERYTHROCYTE [DISTWIDTH] IN BLOOD BY AUTOMATED COUNT: 17.4 % (ref 11.6–14.5)
GLOBULIN SER CALC-MCNC: 4.1 G/DL (ref 2–4)
GLUCOSE BLD STRIP.AUTO-MCNC: 114 MG/DL (ref 70–110)
GLUCOSE BLD STRIP.AUTO-MCNC: 143 MG/DL (ref 70–110)
GLUCOSE BLD STRIP.AUTO-MCNC: 85 MG/DL (ref 70–110)
GLUCOSE BLD STRIP.AUTO-MCNC: 90 MG/DL (ref 70–110)
GLUCOSE SERPL-MCNC: 63 MG/DL (ref 74–99)
HCT VFR BLD AUTO: 22 % (ref 35–45)
HGB BLD-MCNC: 7.1 G/DL (ref 12–16)
LYMPHOCYTES # BLD AUTO: 9 % (ref 20–51)
LYMPHOCYTES # BLD: 0.9 K/UL (ref 0.8–3.5)
MAGNESIUM SERPL-MCNC: 2 MG/DL (ref 1.6–2.6)
MCH RBC QN AUTO: 26.3 PG (ref 24–34)
MCHC RBC AUTO-ENTMCNC: 32.3 G/DL (ref 31–37)
MCV RBC AUTO: 81.5 FL (ref 74–97)
MONOCYTES # BLD: 1.2 K/UL (ref 0–1)
MONOCYTES NFR BLD AUTO: 11 % (ref 2–9)
NEUTS BAND NFR BLD MANUAL: 6 % (ref 0–5)
NEUTS SEG # BLD: 8.2 K/UL (ref 1.8–8)
NEUTS SEG NFR BLD AUTO: 72 % (ref 42–75)
PLATELET # BLD AUTO: 254 K/UL (ref 135–420)
PLATELET COMMENTS,PCOM: ABNORMAL
PMV BLD AUTO: 8.2 FL (ref 9.2–11.8)
POTASSIUM SERPL-SCNC: 3.7 MMOL/L (ref 3.5–5.5)
PROT SERPL-MCNC: 6.4 G/DL (ref 6.4–8.2)
RBC # BLD AUTO: 2.7 M/UL (ref 4.2–5.3)
RBC MORPH BLD: ABNORMAL
RBC MORPH BLD: ABNORMAL
SERVICE CMNT-IMP: NORMAL
SERVICE CMNT-IMP: NORMAL
SODIUM SERPL-SCNC: 137 MMOL/L (ref 136–145)
WBC # BLD AUTO: 10.5 K/UL (ref 4.6–13.2)

## 2017-05-16 PROCEDURE — 94640 AIRWAY INHALATION TREATMENT: CPT

## 2017-05-16 PROCEDURE — 74011000258 HC RX REV CODE- 258: Performed by: NURSE PRACTITIONER

## 2017-05-16 PROCEDURE — 65610000006 HC RM INTENSIVE CARE

## 2017-05-16 PROCEDURE — 85025 COMPLETE CBC W/AUTO DIFF WBC: CPT | Performed by: NURSE PRACTITIONER

## 2017-05-16 PROCEDURE — 74011250637 HC RX REV CODE- 250/637: Performed by: INTERNAL MEDICINE

## 2017-05-16 PROCEDURE — 74011000258 HC RX REV CODE- 258: Performed by: INTERNAL MEDICINE

## 2017-05-16 PROCEDURE — 77030011943

## 2017-05-16 PROCEDURE — 36592 COLLECT BLOOD FROM PICC: CPT

## 2017-05-16 PROCEDURE — 74011250636 HC RX REV CODE- 250/636: Performed by: INTERNAL MEDICINE

## 2017-05-16 PROCEDURE — 74176 CT ABD & PELVIS W/O CONTRAST: CPT

## 2017-05-16 PROCEDURE — 80048 BASIC METABOLIC PNL TOTAL CA: CPT | Performed by: NURSE PRACTITIONER

## 2017-05-16 PROCEDURE — 82962 GLUCOSE BLOOD TEST: CPT

## 2017-05-16 PROCEDURE — 71010 XR CHEST PORT: CPT

## 2017-05-16 PROCEDURE — 83735 ASSAY OF MAGNESIUM: CPT | Performed by: NURSE PRACTITIONER

## 2017-05-16 PROCEDURE — 92610 EVALUATE SWALLOWING FUNCTION: CPT

## 2017-05-16 PROCEDURE — 74011250636 HC RX REV CODE- 250/636: Performed by: HOSPITALIST

## 2017-05-16 PROCEDURE — P9047 ALBUMIN (HUMAN), 25%, 50ML: HCPCS | Performed by: INTERNAL MEDICINE

## 2017-05-16 PROCEDURE — 36415 COLL VENOUS BLD VENIPUNCTURE: CPT | Performed by: NURSE PRACTITIONER

## 2017-05-16 PROCEDURE — 74011250637 HC RX REV CODE- 250/637: Performed by: HOSPITALIST

## 2017-05-16 PROCEDURE — 74011000250 HC RX REV CODE- 250: Performed by: UROLOGY

## 2017-05-16 PROCEDURE — 80076 HEPATIC FUNCTION PANEL: CPT | Performed by: INTERNAL MEDICINE

## 2017-05-16 RX ORDER — DEXTROSE MONOHYDRATE AND SODIUM CHLORIDE 5; .9 G/100ML; G/100ML
50 INJECTION, SOLUTION INTRAVENOUS CONTINUOUS
Status: DISCONTINUED | OUTPATIENT
Start: 2017-05-16 | End: 2017-05-17

## 2017-05-16 RX ORDER — MIDODRINE HYDROCHLORIDE 2.5 MG/1
10 TABLET ORAL EVERY 8 HOURS
Status: DISCONTINUED | OUTPATIENT
Start: 2017-05-16 | End: 2017-05-25 | Stop reason: HOSPADM

## 2017-05-16 RX ADMIN — Medication 10 ML: at 21:30

## 2017-05-16 RX ADMIN — MEROPENEM 500 MG: 500 INJECTION, POWDER, FOR SOLUTION INTRAVENOUS at 23:40

## 2017-05-16 RX ADMIN — ALBUMIN (HUMAN) 25 G: 0.25 INJECTION, SOLUTION INTRAVENOUS at 12:28

## 2017-05-16 RX ADMIN — IPRATROPIUM BROMIDE AND ALBUTEROL SULFATE 3 ML: 2.5; .5 SOLUTION RESPIRATORY (INHALATION) at 08:10

## 2017-05-16 RX ADMIN — FLUCONAZOLE 200 MG: 2 INJECTION INTRAVENOUS at 10:30

## 2017-05-16 RX ADMIN — ALBUMIN (HUMAN) 25 G: 0.25 INJECTION, SOLUTION INTRAVENOUS at 17:46

## 2017-05-16 RX ADMIN — ALBUMIN (HUMAN) 25 G: 0.25 INJECTION, SOLUTION INTRAVENOUS at 05:11

## 2017-05-16 RX ADMIN — FOLIC ACID 1 MG: 1 TABLET ORAL at 08:29

## 2017-05-16 RX ADMIN — IPRATROPIUM BROMIDE AND ALBUTEROL SULFATE 3 ML: 2.5; .5 SOLUTION RESPIRATORY (INHALATION) at 16:00

## 2017-05-16 RX ADMIN — LACTOBACILLUS TAB 1 TABLET: TAB at 17:47

## 2017-05-16 RX ADMIN — LACTOBACILLUS TAB 1 TABLET: TAB at 08:30

## 2017-05-16 RX ADMIN — PRAVASTATIN SODIUM 40 MG: 20 TABLET ORAL at 21:26

## 2017-05-16 RX ADMIN — ALLOPURINOL 50 MG: 100 TABLET ORAL at 08:29

## 2017-05-16 RX ADMIN — ALBUMIN (HUMAN) 25 G: 0.25 INJECTION, SOLUTION INTRAVENOUS at 23:47

## 2017-05-16 RX ADMIN — MEROPENEM 500 MG: 500 INJECTION, POWDER, FOR SOLUTION INTRAVENOUS at 10:30

## 2017-05-16 RX ADMIN — ALUMINUM HYDROXIDE AND MAGNESIUM HYDROXIDE 15 ML: 200; 200 SUSPENSION ORAL at 05:36

## 2017-05-16 RX ADMIN — Medication 10 ML: at 13:23

## 2017-05-16 RX ADMIN — DEXTROSE MONOHYDRATE AND SODIUM CHLORIDE 50 ML/HR: 5; .9 INJECTION, SOLUTION INTRAVENOUS at 04:09

## 2017-05-16 RX ADMIN — FAMOTIDINE 20 MG: 20 TABLET ORAL at 08:30

## 2017-05-16 RX ADMIN — OXYCODONE HYDROCHLORIDE AND ACETAMINOPHEN 1 TABLET: 7.5; 325 TABLET ORAL at 05:26

## 2017-05-16 RX ADMIN — IPRATROPIUM BROMIDE AND ALBUTEROL SULFATE 3 ML: 2.5; .5 SOLUTION RESPIRATORY (INHALATION) at 11:40

## 2017-05-16 RX ADMIN — MIDODRINE HYDROCHLORIDE 10 MG: 2.5 TABLET ORAL at 08:29

## 2017-05-16 RX ADMIN — LINEZOLID 600 MG: 600 INJECTION, SOLUTION INTRAVENOUS at 16:15

## 2017-05-16 RX ADMIN — Medication 10 ML: at 06:00

## 2017-05-16 RX ADMIN — IPRATROPIUM BROMIDE AND ALBUTEROL SULFATE 3 ML: 2.5; .5 SOLUTION RESPIRATORY (INHALATION) at 20:50

## 2017-05-16 RX ADMIN — LINEZOLID 600 MG: 600 INJECTION, SOLUTION INTRAVENOUS at 04:10

## 2017-05-16 RX ADMIN — MIDODRINE HYDROCHLORIDE 10 MG: 2.5 TABLET ORAL at 16:16

## 2017-05-16 RX ADMIN — CHOLECALCIFEROL TAB 25 MCG (1000 UNIT) 2000 UNITS: 25 TAB at 08:30

## 2017-05-16 NOTE — PROGRESS NOTES
Problem: Mobility Impaired (Adult and Pediatric)  Goal: *Acute Goals and Plan of Care (Insert Text)  STGs to be addressed within 3 days:  1. Bed mobility: Supine to sit to supine MaxAx1 with HR for meals. 2. Activity tolerance: Tolerate EOB > 15 minutes for ADLs. 3. Transfers: SPT-->to chair max/mod A with LRAD for ADLs. 4. Pt demo fair seated balance in preparation for functional transfers. LTGs to be addressed within 7 days:  1. Transfers: SB-->to chair/wc max/mod A for ADLs. 2. Activity tolerance: Tolerated > 1 hr in chair for change of position. 3. Patient Education: Independent with HEP for home safety. Attempted PT treatment, patient declined due to fatigue. Asked PT to return tomorrow.   Will follow-up with patient tomorrow per patient request.

## 2017-05-16 NOTE — PROGRESS NOTES
Infectious Disease Progress Note    Requested by: Dr. Davian Yousif, dr. Clarisa Lopez    Reason: sepsis, acute paraplegia    Current abx Prior abx   Ceftriaxone since 4/24  linezolid since 5/14 Cefepime 4/20-4/21  vancomycin  4/20-4/24  Gentamicin 4/21-4/24  Metronidazole  4/20-4/24     Lines:   PICC RUE 4/26    Assessment :    77 y.o., right handed female, with an established history of hypertension, complete heart block with permanent pacemaker placed, diabetes mellitus, diabetic peripheral neuropathy, obesity, admitted to [facility] on 4/20/17. Clinical presentation consistent with  Sepsis (POA)  secondary to group B streptococcus bloodstream infection (positive blood cultures 4/20, negative blood cultures 4/21). Most likely source of bloodstream infection is infected right psoas hematoma/abscess. 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 No signs of neurological recovery on today's exam     2 week h/o pain at the site of abdominal pacemaker, tenderness at the site - however, patient doesn't have erythema at the site of pacemaker. No fluid collection noted at pacemaker pocket site per usg 4/24. Quick clearance of bacteremia would argue against endocarditis/endovascular infection. At this time risk of removal of pacemaker/general pocket change exceed the benefit. Discussed with cardiology. Would recommend close monitoring. Abdominal usg 4/24 doesn't reveal evidence of abscess/fluid collection at the site of pacemaker pocket. Enterococcus in urine cultures 4/20 likely colonizer    Acute renal failure: nephrology consult appreciated. Difficult to determine exact etiology of ARF at this time. Slight improvement in creatinine today. Low grade fever overnight - Ct scan 5/4 reveals increasing focal area within the right psoas muscle inferior to the previously drained area.  could represent either spread of infection inferiorly or intramuscular hematoma. S/p ct guided IR drain check - old drain removed. New drain placed inferiorly on 5/10    No evidence of pneumonia    High grade fevers with tm:102 on 5/10, tm:103 on 5/13 - ?due to infected picc line. two of two sets of blood cultures 5/14 positive for coagulase negative staphylococcus. ?right sided aspiration pneumonia - unable to perform thoracentesis since inadequate fluid. One of two sets of blood cultures could be contaminant. Will need to follow repeat blood cultures to determine this. Acute renal failure: multifactorial - nephrology consult appreciated     bilateral LE dvt - s/p IVC filter placement 5/11    No leukocytosis     Resolved fevers. Now with increased left abdominal pain over the site of pacemaker. Ct scan abdomen pending    Recommendations:    1. cont meropenem for aspiration/hcap, cont fluconazole to cover for possible candida cystitis  2. Continue linezolid for now  3. f/u repeat blood cultures  4. continue intermittent straight cath  5. Remove picc line if  repeat blood cultures positive or persistent fevers  6. Monitor renal function, cbc, clinically  7. F/u ct abdomen  8. Will need to explore abdominal pacemaker pocket if persistent pain at that site - await ct scan to rule out other etiologies of pain - will d/w cardiology. Above plan was discussed in details with rn, . Please call me if any further questions or concerns. Will continue to participate in the care of this patient. subjective:    C/o left abdominal pain over site of pacemaker. Occasional  chills. Denies cp, sob. Unable to move legs. No nausea, vomiting. No new rash/itching/joint pain/back pain. Home Medication List    Details   gabapentin (NEURONTIN) 400 mg capsule Take 1 Cap by mouth two (2) times a day.  Indications: NEUROPATHIC PAIN  Qty: 30 Cap, Refills: 0    Associated Diagnoses: Iliopsoas muscle hematoma, right, subsequent encounter      cholecalciferol (VITAMIN D3) 1,000 unit tablet Take 2 Tabs by mouth daily. Indications: PREVENTION OF VITAMIN D DEFICIENCY  Qty: 30 Tab, Refills: 0      SITagliptin (JANUVIA) 50 mg tablet Take 50 mg by mouth daily. Indications: type 2 diabetes mellitus      potassium chloride (KLOR-CON M20) 20 mEq tablet Take 20 mEq by mouth two (2) times a day. Indications: HYPOKALEMIA PREVENTION      ondansetron (ZOFRAN ODT) 4 mg disintegrating tablet Take 4 mg by mouth every eight (8) hours as needed for Nausea. cyclobenzaprine (FLEXERIL) 10 mg tablet Take 10 mg by mouth as needed for Muscle Spasm(s). Indications: MUSCLE SPASM      carvedilol (COREG) 6.25 mg tablet Take 1 Tab by mouth two (2) times daily (with meals). Indications: hypertension  Qty: 30 Tab, Refills: 0    Associated Diagnoses: Benign hypertensive heart disease with systolic CHF, NYHA class 2 ([facility])      acetaminophen (TYLENOL) 325 mg tablet Take 2 Tabs by mouth every four (4) hours as needed (for fever or pain level less than 5/10). Indications: Fever, Pain  Qty: 30 Tab, Refills: 0    Associated Diagnoses: Iliopsoas muscle hematoma, right, subsequent encounter      allopurinol (ZYLOPRIM) 100 mg tablet Take 1 Tab by mouth daily. Indications: GOUT  Qty: 15 Tab, Refills: 0    Associated Diagnoses: Chronic gout, unspecified cause, unspecified site      insulin glargine (LANTUS) 100 unit/mL injection 15 Units by SubCUTAneous route nightly. Indications: type 2 diabetes mellitus  Qty: 1 Vial, Refills: 0    Associated Diagnoses: Type 2 diabetes mellitus with diabetic neuropathy, with long-term current use of insulin ([facility])      docusate sodium (COLACE) 100 mg capsule Take 1 Cap by mouth daily (after breakfast). Indications: Constipation  Qty: 15 Cap, Refills: 0      senna-docusate (PERICOLACE) 8.6-50 mg per tablet Take 2 Tabs by mouth daily (after dinner).  Indications: Constipation  Qty: 30 Tab, Refills: 0      oxyCODONE-acetaminophen (PERCOCET 7.5) 7.5-325 mg per tablet Take 1 Tab by mouth every four (4) hours as needed (for pain level greater than 4/10). Max Daily Amount: 6 Tabs. Indications: Pain  Qty: 50 Tab, Refills: 0    Associated Diagnoses: Iliopsoas muscle hematoma, right, subsequent encounter      bumetanide (BUMEX) 1 mg tablet TAKE 1 TABLET TWICE A DAY  Qty: 180 Tab, Refills: 1      pravastatin (PRAVACHOL) 40 mg tablet Take 40 mg by mouth nightly. Indications: DYSLIPIDEMIA      ferrous sulfate 325 mg (65 mg iron) tablet Take 1 Tab by mouth two (2) times daily (with meals).   Qty: 30 Tab, Refills: 0      insulin aspart (NOVOLOG) 100 unit/mL injection INITIATE INSULIN CORRECTIVE PROTOCOL (HR): Normal Insulin Sensitivity  For Blood Sugar (mg/dL) of:    Less than 150 =   0 units          150 -199 =   2 units 200 -249 =   4 units 250 -299 =   6 units 300 -349 =   8 units 350 and above =   10 units If 2 glucose readings are above 200 mg/dL  Qty: 10 mL, Refills: 6             Current Facility-Administered Medications   Medication Dose Route Frequency    dextrose 5% and 0.9% NaCl infusion  50 mL/hr IntraVENous CONTINUOUS    midodrine (PROAMITINE) tablet 10 mg  10 mg Oral Q8H    fluconazole (DIFLUCAN) 200mg/100 mL IVPB (premix)  200 mg IntraVENous Q24H    albumin human 25% (BUMINATE) solution 25 g  25 g IntraVENous Q6H    meropenem (MERREM) 500 mg in 0.9% sodium chloride (MBP/ADV) 50 mL MBP  0.5 g IntraVENous Q12H    insulin lispro (HUMALOG) injection   SubCUTAneous Q6H    acetaminophen (TYLENOL) suppository 650 mg  650 mg Rectal Q6H PRN    acetaminophen (TYLENOL) tablet 500 mg  500 mg Oral Q6H PRN    linezolid (ZYVOX) IVPB premix in D5W 600 mg  600 mg IntraVENous Q12H    diclofenac (VOLTAREN) 1 % topical gel 2 g  2 g Topical Q8H PRN    allopurinol (ZYLOPRIM) tablet 50 mg  50 mg Oral DAILY    aluminum-magnesium hydroxide (MAALOX) oral suspension 15 mL  15 mL Oral QID PRN    albuterol-ipratropium (DUO-NEB) 2.5 MG-0.5 MG/3 ML  3 mL Nebulization Q4HWA RT    Lactobacillus Acidoph & Bulgar [facility]) tablet 1 Tab  1 Tab Oral BID    sodium chloride (NS) flush 5-10 mL  5-10 mL IntraVENous Q8H    naloxone (NARCAN) injection 0.1 mg  0.1 mg IntraVENous Multiple    acetaminophen (TYLENOL) tablet 650 mg  650 mg Oral Q4H PRN    famotidine (PEPCID) tablet 20 mg  20 mg Oral DAILY    0.9% sodium chloride infusion 250 mL  250 mL IntraVENous PRN    diphenhydrAMINE (BENADRYL) capsule 50 mg  50 mg Oral Q4H PRN    polyethylene glycol (MIRALAX) packet 17 g  17 g Oral TID    cholecalciferol (VITAMIN D3) tablet 2,000 Units  2,000 Units Oral DAILY    docusate sodium (COLACE) capsule 100 mg  100 mg Oral DAILY AFTER BREAKFAST    oxyCODONE-acetaminophen (PERCOCET 7.5) 7.5-325 mg per tablet 1 Tab  1 Tab Oral Q4H PRN    pravastatin (PRAVACHOL) tablet 40 mg  40 mg Oral QHS    senna-docusate (PERICOLACE) 8.6-50 mg per tablet 2 Tab  2 Tab Oral PCD    folic acid (FOLVITE) tablet 1 mg  1 mg Oral DAILY    ondansetron (ZOFRAN) injection 4 mg  4 mg IntraVENous Q8H PRN    glucose chewable tablet 16 g  16 g Oral PRN    glucagon (GLUCAGEN) injection 1 mg  1 mg IntraMUSCular PRN    dextrose (D50W) injection syrg 12.5-25 g  25-50 mL IntraVENous PRN       Allergies: Vancomycin; Ampicillin; Bactrim [sulfamethoxazole-trimethoprim]; Blueberry; Ciprofloxacin; Codeine; Crestor [rosuvastatin]; Darvocet a500 [propoxyphene n-acetaminophen]; Demerol [meperidine]; Levaquin [levofloxacin]; Lipitor [atorvastatin]; Magnesium oxide; Minocin [minocycline]; Pcn [penicillins]; Pravachol [pravastatin]; Sulfa (sulfonamide antibiotics); Ultracet [tramadol-acetaminophen]; Vicodin [hydrocodone-acetaminophen];  Vytorin 10-10 [ezetimibe-simvastatin]; and Percodan [oxycodone hcl-oxycodone-asa]    Temp (24hrs), Av.7 °F (37.1 °C), Min:98.4 °F (36.9 °C), Max:99.2 °F (37.3 °C)    Visit Vitals    /52 (BP 1 Location: Left arm, BP Patient Position: At rest;Head of bed elevated (Comment degrees))  Comment (BP Patient Position): 30    Pulse 92    Temp 98.4 °F (36.9 °C)    Resp 15    Ht 5' 7\" (1.702 m)    Wt 113.3 kg (249 lb 12.5 oz)    SpO2 100%    Breastfeeding No    BMI 39.12 kg/m2       ROS: patient unable to communicate fluently. Detailed ros not feasible. Physical Exam:    General: Well developed, well nourished female laying on the bed, sleepy, NAD    General:   awake alert and oriented   HEENT:  Normocephalic, atraumatic, PERRL, EOMI, no scleral icterus or pallor; no conjunctival hemmohage;  nasal and oral mucous are moist and without evidence of lesions. Neck supple, no bruits. Lymph Nodes:   no cervical, axillary or inguinal adenopathy   Lungs:   non-labored, bilaterally clear to auscultation- no crackles wheezes rales or rhonchi   Heart:   s1 and s2 irregular; no rubs or gallops, no edema, + pedal pulses   Abdomen:  soft, non-distended, active bowel sounds, no hepatomegaly, no splenomegaly. tenderness over the left abdominal pacemaker, no overlying erythema or fluctuance, drain right lower abdomen with bloody drainage   Genitourinary:  deferred   Extremities:   no clubbing, cyanosis; no joint effusions or swelling; muscle mass appropriate for age   Neurologic:  No gross focal sensory abnormalities; 5/5 muscle strength to upper extremities. 0/5 strength in lower extremities.   Cranial nerves intact                        Skin:  Surgical scars abdomen, left neck well healed   Back:   no paraspinal muscle guarding or rigidity, right CVA tenderness     Psychiatric:  No suicidal or homicidal ideations, appropriate mood and affect         Labs: Results:   Chemistry Recent Labs      05/16/17   0315  05/15/17   0604  05/14/17   1630   GLU  63*  109*  132*   NA  137  136  135*   K  3.7  4.0  4.0   CL  104  102  100   CO2  26  25  26   BUN  28*  30*  31*   CREA  2.88*  2.98*  3.26*   CA  8.8  8.1*  8.2*   AGAP  7  9  9   BUCR  10*  10*  10*   AP   --    --   298*   TP   --    --   5.9* ALB   --    --   1.3*   GLOB   --    --   4.6*   AGRAT   --    --   0.3*      CBC w/Diff Recent Labs      05/16/17   0315  05/15/17   0604  05/14/17   1630   WBC  10.5  10.6  11.5   RBC  2.70*  2.67*  2.85*   HGB  7.1*  7.1*  7.6*   HCT  22.0*  21.5*  23.2*   PLT  254  233  237   GRANS  72  73  72   LYMPH  9*  14*  16*   EOS  1  3  3      Microbiology Recent Labs      05/15/17   1026  05/15/17   0900  05/14/17   0455  05/14/17   0432   CULT  NO GROWTH AFTER 21 HOURS  NO GROWTH AFTER 21 HOURS  AEROBIC BOTTLE  STAPHYLOCOCCUS SPECIES  *  AEROBIC AND ANAEROBIC BOTTLES  STAPHYLOCOCCUS SPECIES, COAGULASE NEGATIVE  *          RADIOLOGY:    All available imaging studies/reports in [facility] for this admission were reviewed    Dr. Angel Ledesma, Infectious Disease Specialist  [phone]  May 16, 2017  3:49 PM

## 2017-05-16 NOTE — INTERDISCIPLINARY ROUNDS
CRITICAL CARE INTERDISCIPLINARY ROUNDS      Patient Information:    Name:   Rachel Maravilla    Age:   77 y.o. Admission Date:   4/20/2017    Readmit Risk Assessment Information:      Readmit Risk Tool Support Systems: Child(lae), Family member(s), Buddhism / braulio community, Friends \ neighbors, Spouse/Significant Other/Partner, Relationship with Primary Physician Group: Seen at least one time within the past 6 months    Surgery Date:      Day of Stay:     Expected Discharge Date:        Attending Provider:   Mary Jo Stearns MD    Surgeon:        Consultant:       Primary Care Provider:   Marvin Pace DO    Problem List:     Patient Active Problem List   Diagnosis Code    Nonischemic cardiomyopathy ([facility] I42.8    History of complete heart block Z86.79    Biventricular implantable cardioverter-defibrillator in situ Z95.810    Left bundle branch block (LBBB) on electrocardiogram I44.7    Type 2 diabetes mellitus with diabetic neuropathy ([facility] E11.40    Dyslipidemia E78.5    Diabetic neuropathy associated with type 2 diabetes mellitus ([facility] E11.40    Obstructive sleep apnea on CPAP G47.33, Z99.89    AICD generator infection ([facility] T82. 7XXA    Difficult airway for intubation T88. 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 1XXA    Impaired mobility and ADLs Z74.09    History of Coumadin therapy Z79.01    Gout M10.9    Acute paraplegia (HCC) G82.20    Sepsis ([facility] A41.9    Psoas abscess, right ([facility] K68.12    Krueger catheter in place on admission Z96.0    Urinary tract infection due to Enterococcus N39.0, B95.2    Group B streptococcal infection A49.1       Principal Problem:  Acute paraplegia ([facility]    Procedure:       During rounds the following quality care indicators and evidence based practices were addressed :     DVT Prophylaxis, Pressure Injury Prevention, Pain Management, Sepsis resuscitation and management, Nutritional Status, Critical Care Interventions Airways, Drains, Lines and Pressors and IHI Bundles: Central Line Bundle Followed  and Krueger Bundle Followed           Acute MI/PCI:   Not applicable    Heart Failure:    Not applicable    Cardiac Surgery:  Not applicable    SCIP Measures for other Surgeries:   Not applicable    Pneumonia:    Not applicable    Stroke:  Patient's Personal Risk Factors for Stroke are:   hypertension, family history, hyperlipidemia or diabetes mellitus    NIH Stroke Score       Transfer Level of Care:  Not Ready for Transfer    The patient will require the following interventions based on the Readmission Risk Assessment:  Pharmacy evaluation and teaching, Care Management involvement for home health follow up for:  mobility and assistance with ADL's, Palliative Care evaluation and Spiritual Care evaluation      Discharge Management:  Home and Palliative Care    Anticipated Discharge Date:  3days      Interdisciplinary team rounds were held  with the following team membersCare Management, Diabetes Treatment Specialist, Nursing, Nutrition, Pastoral Care, Pharmacy, Physician and Clinical Coordinator and the  patient and healthcare POA (documentation required). Plan of care discussed. See clinical pathway and/or care plan for interventions and desired outcomes. CT scan of abdomen and pelvis ordered. Possible removal of PICC.

## 2017-05-16 NOTE — ROUTINE PROCESS
Straight Cath done as ordered q6 hrs. Patient voided prior to cath still obtained 600 ml of clear robert urine. Incontinent of large soft formed stool. Am bath given and all linen been changed.

## 2017-05-16 NOTE — PROGRESS NOTES
RENAL DAILY PROGRESS NOTE    Patient: Abhay Garcia               Sex: female          DOA: 4/20/2017  3:36 PM        YOB: 1950      Age:  77 y.o.        LOS:  LOS: 26 days     Subjective:     Abhay Garcia is a 77 y.o.  who presents with Acute paraplegia  Acute paraplegia ([facility] [address] Asked to evaluate for acute/crf stage 3.admitted with paraplegias,psoas muscle hematoma,s/p drain placement. hx of dm,gout  Chief complains: Patient c/o abd pain  - Reviewed last 24 hrs events     Current Facility-Administered Medications   Medication Dose Route Frequency    dextrose 5% and 0.9% NaCl infusion  50 mL/hr IntraVENous CONTINUOUS    midodrine (PROAMITINE) tablet 10 mg  10 mg Oral Q8H    fluconazole (DIFLUCAN) 200mg/100 mL IVPB (premix)  200 mg IntraVENous Q24H    albumin human 25% (BUMINATE) solution 25 g  25 g IntraVENous Q6H    meropenem (MERREM) 500 mg in 0.9% sodium chloride (MBP/ADV) 50 mL MBP  0.5 g IntraVENous Q12H    insulin lispro (HUMALOG) injection   SubCUTAneous Q6H    acetaminophen (TYLENOL) suppository 650 mg  650 mg Rectal Q6H PRN    acetaminophen (TYLENOL) tablet 500 mg  500 mg Oral Q6H PRN    linezolid (ZYVOX) IVPB premix in D5W 600 mg  600 mg IntraVENous Q12H    diclofenac (VOLTAREN) 1 % topical gel 2 g  2 g Topical Q8H PRN    allopurinol (ZYLOPRIM) tablet 50 mg  50 mg Oral DAILY    aluminum-magnesium hydroxide (MAALOX) oral suspension 15 mL  15 mL Oral QID PRN    albuterol-ipratropium (DUO-NEB) 2.5 MG-0.5 MG/3 ML  3 mL Nebulization Q4HWA RT    Lactobacillus Acidoph & Bulgar (FLORANEX) tablet 1 Tab  1 Tab Oral BID    sodium chloride (NS) flush 5-10 mL  5-10 mL IntraVENous Q8H    naloxone (NARCAN) injection 0.1 mg  0.1 mg IntraVENous Multiple    acetaminophen (TYLENOL) tablet 650 mg  650 mg Oral Q4H PRN    famotidine (PEPCID) tablet 20 mg  20 mg Oral DAILY    0.9% sodium chloride infusion 250 mL  250 mL IntraVENous PRN    diphenhydrAMINE (BENADRYL) capsule 50 mg 50 mg Oral Q4H PRN    polyethylene glycol (MIRALAX) packet 17 g  17 g Oral TID    cholecalciferol (VITAMIN D3) tablet 2,000 Units  2,000 Units Oral DAILY    docusate sodium (COLACE) capsule 100 mg  100 mg Oral DAILY AFTER BREAKFAST    oxyCODONE-acetaminophen (PERCOCET 7.5) 7.5-325 mg per tablet 1 Tab  1 Tab Oral Q4H PRN    pravastatin (PRAVACHOL) tablet 40 mg  40 mg Oral QHS    senna-docusate (PERICOLACE) 8.6-50 mg per tablet 2 Tab  2 Tab Oral PCD    folic acid (FOLVITE) tablet 1 mg  1 mg Oral DAILY    ondansetron (ZOFRAN) injection 4 mg  4 mg IntraVENous Q8H PRN    glucose chewable tablet 16 g  16 g Oral PRN    glucagon (GLUCAGEN) injection 1 mg  1 mg IntraMUSCular PRN    dextrose (D50W) injection syrg 12.5-25 g  25-50 mL IntraVENous PRN       Objective:     Visit Vitals    /45    Pulse 80    Temp 98.4 °F (36.9 °C)    Resp 9    Ht 5' 7\" (1.702 m)    Wt 113.3 kg (249 lb 12.5 oz)    SpO2 100%    Breastfeeding No    BMI 39.12 kg/m2       Intake/Output Summary (Last 24 hours) at 05/16/17 1154  Last data filed at 05/16/17 0800   Gross per 24 hour   Intake          1124.65 ml   Output              889 ml   Net           235.65 ml       Physical Examination:     RS: Chest is bilateral equal, no wheezing / rales / crackles  CVS: S1-S2 heard, RRR, No S3 / murmur  Abdomen: sl tender  Extremities: + edema  CNS: Awake & follows commands, CN II-XII are grossly intact. HEENT: Head is atraumatic, PERRLA, conjunctiva pink & non icteric. No JVD or carotid bruit   Musculoskeletal: No gross joints or bone deformities   Lymph Node: No palpable cervical, axillary or groin lymphadenopathy.       Data Review:      Labs:     Hematology:   Recent Labs      05/16/17   0315  05/15/17   0604  05/14/17   1630   WBC  10.5  10.6  11.5   HGB  7.1*  7.1*  7.6*   HCT  22.0*  21.5*  23.2*     Chemistry:   Recent Labs      05/16/17   0315  05/15/17   0604  05/14/17   1630   BUN  28*  30*  31*   CREA  2.88*  2.98*  3.26* CA  8.8  8.1*  8.2*   ALB   --    --   1.3*   K  3.7  4.0  4.0   NA  137  136  135*   CL  104  102  100   CO2  26  25  26   PHOS   --   4.0   --    GLU  63*  109*  132*        Images:    XR (Most Recent). CXR reviewed by me and compared with previous CXR   Results from Hospital Encounter encounter on 04/20/17   XR CHEST PORT   Narrative CHEST    CPT CODE: 99883    HISTORY: Pneumonia, effusion. FINDINGS: Single AP portable view of chest at 0451 demonstrates a right arm PICC  with tip at the lateral rib margin, probable distal subclavian vein. There is  been some interval decrease in right base hazy opacity, possible small  improvement in pleural effusion. There is mild elevation right hemidiaphragm. The left lung is grossly clear. Mediastinum is not widened but the heart may be  at the upper limits of normal in size. The bones and soft tissues are  unremarkable except for sternal wires from prior median sternotomy. There is no  other change from 14 May 2017. Impression IMPRESSION:    Some improved right lung aeration, possible decreased right pleural  effusion/infiltrates. Left lung grossly clear. CT (Most Recent)   Results from Hospital Encounter encounter on 04/20/17   CT ABD PELV WO CONT   Narrative CT abdomen and pelvis without IV or oral contrast    INDICATION: Evaluate fluid collection and new left upper quadrant pain. Comparison May 4, 2017    All CT scans at this facility are performed using dose optimization technique as  appropriate to a performed exam, to include automated exposure control,  adjustment of the mA and/or kV according to patient size (including appropriate  matching for site specific examination) or use of iterative reconstruction  technique. More consolidation/pneumonia in the right lower lobe. Less severe left lower  lobe pneumonia when compared to prior study. No significant effusion. Cardiomegaly with no pericardial effusion.     The right psoas muscle is slightly smaller. Draining catheter is still in  place. No large fluid collection surrounding the catheter. Small residual  collection not completely excluded without IV contrast.    Also slightly smaller presacral fluid. Small abscess or loculation not  completely excluded but no focal collection identified. No new abnormalities in the left upper quadrant. No left upper quadrant  inflammation or free air. Splenomegaly is unchanged. Pancreas, liver, adrenal  glands and kidneys are stable with large left renal cyst. Aorta is normal in  caliber. IVC filter in place. More subcutaneous edema/anasarca along both sides. No discrete fluid collection. No bowel obstruction. Fluid in the colon, mild colitis, diarrheal disease  possible. However, moderate fecal material retention in the rectosigmoid colon  and descending colon noted. Impression IMPRESSION:  1. Smaller right psoas muscle implying smaller intramuscular fluid collection  and less edema/inflammation. Draining catheter is still in place. Limited  evaluation without IV contrast. Slightly smaller presacral fluid with no  discrete collection. 2. Less severe left lower lobe pulmonary consolidation but more severe right  lower lobe consolidation. No pleural effusion. 3. Worsening anasarca/subcutaneous edema on both sides. 4. More fluid in the ascending and transverse colon, colitis or diarrheal  disease? EKG Results for orders placed or performed in visit on 11/01/16   AMB POC EKG ROUTINE W/ 12 LEADS, INTER & REP     Status: None    Narrative    AV sequentially paced rhythm with a rate of 67. I have personally reviewed the old medical records and patient's labs    Plan / Recommendation:      1. Acute/crf stage 3.neurogenic bladder. sepsis. improving. nurses are doing int cath   2.anemia,consider transfusion  3. infected psoas muscle hematoma,s/p drain placement,  3.uti,pneumonia  4.adjusted meds for renal failure    D/w  Concha Gomez MD  Nephrology  5/16/2017

## 2017-05-16 NOTE — PROGRESS NOTES
Problem: Dysphagia (Adult)  Goal: *Acute Goals and Plan of Care (Insert Text)  Patient will:  1. Tolerate PO trials with 0 s/s overt distress in 4/5 trials  2. Utilize compensatory swallow strategies/maneuvers (decrease bite/sip, size/rate, alt. liq/sol) with min cues in 4/5 trials  3. Perform oral-motor/laryngeal exercises to increase oropharyngeal swallow function with min cues  4. Complete an objective swallow study (i.e., MBSS) to assess swallow integrity, r/o aspiration, and determine of safest LRD, min A    Rec:   Puree diet with honey-thick liquids  Aspiration precautions  HOB >45 during po intake, remain >30 for 30-45 minutes after po   Small bites/sips; alternate liquid/solid with slow feeding rate   Oral care TID  Meds in puree  [address] EVALUATION     Patient: Fernando Arnold (49 y.o. female)  Date: 5/16/2017  Primary Diagnosis: Acute paraplegia  Acute paraplegia (HCC)        Precautions: aspiration  Fall, Skin      ASSESSMENT :  Based on the objective data described below, the patient presents with mod oropharyngeal dysphagia c/b increase in RR and decreased/weak laryngeal elevation during nectar-thick and thin liquid trials. Improved tolerance of puree and honey-think liquids. Pt lethargic and required mod cues from SLP and daughter to [facility] and clear reg solid bolus trial. Poor endurance throughout evaluation secondary to lethargy and limited attention to task. Rec puree diet with honey-thick liquids, aspiration precautions, oral care TID, and meds in puree. D/w RN. ST will continue to follow. Patient will benefit from skilled intervention to address the above impairments.   Patients rehabilitation potential is considered to be Fair  Factors which may influence rehabilitation potential include:   [X]            None noted       PLAN :  Recommendations and Planned Interventions: See above  Frequency/Duration: Patient will be followed by speech-language pathology 1-2 times per day/4-7 days per week to address goals. Discharge Recommendations: Naveen Dixon and To Be Determined       SUBJECTIVE:   Patient stated That is okay. OBJECTIVE:       Past Medical History:   Diagnosis Date    Acute paraplegia ([facility] [address] 4/20/2017    Benign hypertensive heart disease with systolic CHF, NYHA class 2 ([facility] [address] 9/5/2012    Biventricular implantable cardioverter-defibrillator in situ 04/28/2005     Upgraded to BiV AICD; gen change 4/2008; pocket revision 10/2009; Abdominal - done on 8/22/2012 by Dr. Micha Jacobson Cardiac cath 08/15/1996     Patent coronaries. Elev LVEDP. EF 50-55%.  Cardiac echocardiogram 06/23/2015     Ltd study. EF 45-50%. Mild, diffuse hypk. Severe apical hypk. No mass or thrombus was clearly identified, although imaging was suboptimal.      Cardiac nuclear imaging test 06/19/2015     Fixed distal apical, distal septal defect more likely due to RV pacing than prior infarct. No ischemia. EF 46%. RWMA c/w RV pacing. Nondiagnostic EKG on pharm stress test.      Cardiovascular lower extremity venous duplex 09/04/2012     Acute, non-occlusive DVT in CFV on right. No DVT on left. No superficial thrombosis bilaterally.  Cardiovascular upper extremity venous duplex 08/27/2012     DVT in axillary vein on left. Left subclavian was not visualized.     Chronic anemia 9/5/2012    Chronic systolic heart failure (HCC)      Decreased calculated glomerular filtration rate (GFR) 3/30/2017     Calculated GFR equivalent to that of CKD stage 3 = 30-59 ml/min    Diabetic neuropathy associated with type 2 diabetes mellitus ([facility] [address] 6/28/2011    Difficult airway for intubation 08/22/2012     see anesthesia airway note    Dyslipidemia 6/28/2011    Gout      History of complete heart block 6/28/2011    History of Coumadin therapy       Anticoagulation for DVT of the LUE; Discontinued on 3/30/2017    History of deep venous thrombosis 9/5/2012     Left upper extremity    History of pyelonephritis 3/30/2017    Left bundle branch block (LBBB) on electrocardiogram 6/28/2011    Nonischemic cardiomyopathy ([facility] [address] 6/28/2011    Obesity (BMI 35.0-39.9 without comorbidity) ([address] 3/13/2017    Obstructive sleep apnea on CPAP 2/7/2012    Psoas abscess, right ([facility] [address] 4/20/2017    Psoas hematoma, right, secondary to anticoagulant therapy 3/30/2017    Type 2 diabetes mellitus with diabetic neuropathy ([facility] [address] 6/28/2011     Past Surgical History:   Procedure Laterality Date    HX CARPAL TUNNEL RELEASE   4/07     right     HX CHOLECYSTECTOMY   1994    HX HYSTERECTOMY   1973    HX OTHER SURGICAL   6/11/2012     AICD revision    HX PACEMAKER   4/28/2005     [employer] AICD     Prior Level of Function/Home Situation: private residence  Home Situation  Home Environment: Private residence  # Steps to Enter: 3  One/Two Story Residence: One story  Living Alone: No  Support Systems: Child(lea), Family member(s), [employer] / [place] community, Friends \ neighbors, Spouse/Significant Other/Partner  Patient Expects to be Discharged to[de-identified] Unknown  Current DME Used/Available at Home: Cane, straight, Glucometer  Diet prior to admission: suspect reg with thin   Current Diet:  Puree with honey-thick   Cognitive and Communication Status:  Neurologic State: Alert  Orientation Level: Oriented to person, Oriented to place, Oriented to situation  Cognition: Follows commands  Perception: Appears intact  Perseveration: No perseveration noted  Safety/Judgement: Awareness of environment, Fall prevention  Oral Assessment:  Oral Assessment  Labial: Decreased rate  Dentition: Natural  Oral Hygiene: adequate  Lingual: Decreased strength  Velum: No impairment  Mandible: No impairment  P.O. Trials:  Patient Position: [facility] at [facility]  Vocal quality prior to P.O.: Low volume  Consistency Presented: Thin liquid; Nectar thick liquid;Honey thick liquid;Puree; Solid  How Presented: Self-fed/presented;SLP-fed/presented;Cup/sip;Spoon;Straw  Bolus Acceptance: Impaired  Bolus Formation/Control: Impaired  Type of Impairment: Anterior;Delayed;Poor;Posterior;Mastication  Propulsion: Delayed (# of seconds); Discoordination  Oral Residue: None  Initiation of Swallow: Delayed (# of seconds)  Laryngeal Elevation: Weak;Decreased  Aspiration Signs/Symptoms: Increase in RR; Infiltrate on chest xray  Pharyngeal Phase Characteristics: Suspected pharyngeal residue;Poor endurance; Feeling of discomfort; Audible swallow  Effective Modifications: Small sips and bites  Cues for Modifications: Minimal     Oral Phase Severity: Moderate  Pharyngeal Phase Severity : Moderate     GCODESwallowing:  Swallow Current Status CL= 60-79%   Swallow Goal Status CK= 40-59%     The severity rating is based on the following outcomes:             Clinical Judgment     Pain:  Pt c/o 0/10 pain prior to evaluation. Pt c/o 0/10 pain post evaluation. After treatment:   [ ]            Patient left in no apparent distress sitting up in chair  [X]            Patient left in no apparent distress in bed  [X]            Call bell left within reach  [X]            Nursing notified  [ ]            Caregiver present  [ ]            Bed alarm activated      COMMUNICATION/EDUCATION:   [X]            SLP educated pt with regard to compensatory swallow strategies and                  aspiration/reflux precautions including: small bites/sips,                  alternate liquids/solids, decrease feeding rate, HOB > 45 with all po, and                             upright in bed at 30 degrees after po for at least 45 minutes. [X]            Patient/family have participated as able in goal setting and plan of care.      Thank you for this referral.     Danielle Mobley M.S. CCC-SLP/L  Speech-Language Pathologist

## 2017-05-16 NOTE — PROGRESS NOTES
NUTRITION    Nutrition Consult: General Nutrition Management & Supplements      RECOMMENDATIONS / PLAN:     - Add supplements: Magic Cup TID.   - Continue RD inpatient monitoring and evaluation. NUTRITION INTERVENTIONS & DIAGNOSIS:     [x] Meals/Snacks: modified diet  [x] Medical food supplementation: initiate   [x] Vitamin/mineral supplementation: ergocalciferol, folic acid   [x] IV fluid: D5 NS at 50 mL/hr (60 gm dextrose, 204 kcal)     Nutrition Diagnosis: Inadequate oral intake related to decreased appetite as evidenced by poor meal intake PTA and since admission. ASSESSMENT:     Subjective/Objective: Poor po intake since admission. Diet resumed after follow up swallow evaluation today.      Average po intake adequate to meet patients estimated nutritional needs:   [] Yes     [x] No   [] Unable to determine at this time    Diet: DIET DIABETIC CONSISTENT CARB Pureed; 2 HONEY     Food Allergies: blueberry   Current Appetite:   [] Good     [] Fair     [x] Poor     [] Other:   Appetite/meal intake prior to admission:   [] Good     [] Fair     [x] Poor   [] Other:  Feeding Limitations:  [x] Swallowing difficulty: SLP following    [] Chewing difficulty    [] Other:   Current Meal Intake:   Patient Vitals for the past 100 hrs:   % Diet Eaten   05/14/17 1343 25 %   05/14/17 0919 0 %   05/13/17 1849 0 %   05/13/17 1420 20 %   05/12/17 1429 25 %     BM: 5/16  Skin Integrity: callus on right foot   Edema:  2+ LEs, UEs, sacral, genital, generalized   Pertinent Medications: Reviewed: bowel regimen, zofran, maalox     Recent Labs      05/16/17   0315  05/15/17   0604  05/14/17   1630   NA  137  136  135*   K  3.7  4.0  4.0   CL  104  102  100   CO2  26  25  26   GLU  63*  109*  132*   BUN  28*  30*  31*   CREA  2.88*  2.98*  3.26*   CA  8.8  8.1*  8.2*   MG  2.0  1.8   --    PHOS   --   4.0   --    ALB   --    --   1.3*   SGOT   --    --   83*   ALT   --    --   69*       Intake/Output Summary (Last 24 hours) at 05/16/17 1045  Last data filed at 05/16/17 0800   Gross per 24 hour   Intake          1124.65 ml   Output              889 ml   Net           235.65 ml       Anthropometrics:  Ht Readings from Last 1 Encounters:   05/14/17 5' 7\" (1.702 m)     Last 3 Recorded Weights in this Encounter    05/13/17 0613 05/14/17 0542 05/16/17 0100   Weight: 117.8 kg (259 lb 11.2 oz) 119.6 kg (263 lb 10.7 oz) 113.3 kg (249 lb 12.5 oz)     Body mass index is 39.12 kg/(m^2). Obese, Class III           Weight History: Per chart review, patient reported usual weight of 240 lb       Weight Metrics 5/16/2017 4/20/2017 4/6/2017 4/6/2017 3/30/2017 3/16/2017 3/14/2017   Weight 249 lb 12.5 oz - 233 lb 227 lb 1.6 oz - 240 lb 243 lb   BMI - 39.12 kg/m2 36.49 kg/m2 - 35.57 kg/m2 37.59 kg/m2 38.06 kg/m2        Admitting Diagnosis: Acute paraplegia  Acute paraplegia [facility])  Past Medical History:   Diagnosis Date    Acute paraplegia ([facility] [address] 4/20/2017    Benign hypertensive heart disease with systolic CHF, NYHA class 2 ([facility] [address] 9/5/2012    Biventricular implantable cardioverter-defibrillator in situ 04/28/2005    Upgraded to BiV AICD; gen change 4/2008; pocket revision 10/2009; Abdominal - done on 8/22/2012 by Dr. Turner Feldman Cardiac cath 08/15/1996    Patent coronaries. Elev LVEDP. EF 50-55%.  Cardiac echocardiogram 06/23/2015    Ltd study. EF 45-50%. Mild, diffuse hypk. Severe apical hypk. No mass or thrombus was clearly identified, although imaging was suboptimal.      Cardiac nuclear imaging test 06/19/2015    Fixed distal apical, distal septal defect more likely due to RV pacing than prior infarct. No ischemia. EF 46%. RWMA c/w RV pacing. Nondiagnostic EKG on pharm stress test.      Cardiovascular lower extremity venous duplex 09/04/2012    Acute, non-occlusive DVT in CFV on right. No DVT on left. No superficial thrombosis bilaterally.     Cardiovascular upper extremity venous duplex 08/27/2012    DVT in axillary vein on left.  Left subclavian was not visualized.  Chronic anemia 9/5/2012    Chronic systolic heart failure (HCC)     Decreased calculated glomerular filtration rate (GFR) 3/30/2017    Calculated GFR equivalent to that of CKD stage 3 = 30-59 ml/min    Diabetic neuropathy associated with type 2 diabetes mellitus ([facility] [address] 6/28/2011    Difficult airway for intubation 08/22/2012    see anesthesia airway note    Dyslipidemia 6/28/2011    Gout     History of complete heart block 6/28/2011    History of Coumadin therapy     Anticoagulation for DVT of the LUE; Discontinued on 3/30/2017    History of deep venous thrombosis 9/5/2012    Left upper extremity    History of pyelonephritis 3/30/2017    Left bundle branch block (LBBB) on electrocardiogram 6/28/2011    Nonischemic cardiomyopathy ([facility] [address] 6/28/2011    Obesity (BMI 35.0-39.9 without comorbidity) ([facility] [address] 3/13/2017    Obstructive sleep apnea on CPAP 2/7/2012    Psoas abscess, right ([facility] [address] 4/20/2017    Psoas hematoma, right, secondary to anticoagulant therapy 3/30/2017    Type 2 diabetes mellitus with diabetic neuropathy ([facility] [address] 6/28/2011       Education Needs:        [x] None identified  [] Identified - Not appropriate at this time  []  Identified and addressed - refer to education log  Learning Limitations:   [x] None identified  [] Identified    Cultural, [place] & ethnic food preferences:  [x] None identified    [] Identified and addressed     ESTIMATED NUTRITION NEEDS:     Calories: 0511-9288 kcal (MSJx1.2-1.3) based on  [x] Actual BW: 111 kg      [] IBW   CHO: 252-273 gm (50% kcal)   Protein:  gm (0.8-1 gm/kg) based on  [x] Actual BW      [] IBW   Fluid: 1 mL/kcal     MONITORING & EVALUATION:     Nutrition Goal(s):   1. Nutritional needs will be met through adequate oral intake or nutrition support within the next 7 days.   Outcome:  [] Met/Ongoing    [x]  Not Met    [] New/Initial Goal     Monitoring:   [x] Diet tolerance   [x] Meal intake   [x] Supplement intake   [] GI symptoms/ability to tolerate po diet   [] Respiratory status   [] Plan of care      Previous Recommendations (for follow-up assessments only):     [x]   Implemented       []   Not Implemented (RD to address)     [] No Recommendation Made     Discharge Planning: diabetic, cardiac diet, consistency as tolerated per SLP  [x] Participated in care planning, discharge planning, & interdisciplinary rounds as appropriate      Hitesh Francois, [address]   Pager: [phone]

## 2017-05-16 NOTE — PROGRESS NOTES
Progress Note    Patient: Kalin Boyer MRN: [id number]  SSN: [id number]    YOB: 1950  Age: 77 y.o.   Sex: female      Admit Date: 4/20/2017    LOS: 26 days     Subjective:     C/O VAGUE PAIN IN LUQ  NO N/V  URINE OUTPUT GOOD  NO DRAINAGE  FROM  PIGTAIL DRAIN IN RETROPERITONEUM  WAS ON LOW DOSE PRESSORS  LAST NIGHT        Objective:     Vitals:    05/16/17 0700 05/16/17 0730 05/16/17 0800 05/16/17 0812   BP: 108/51 108/48 105/52    Pulse: 98 90 92    Resp: 12 9 15    Temp:   98.4 °F (36.9 °C)    TempSrc:       SpO2: 97% 98% 99% 100%   Weight:       Height:            Intake and Output:  Current Shift:    Last three shifts: 05/14 1901 - 05/16 0700  In: 3133 [I.V.:3123]  Out: 1662 [Urine:1655; Drains:7]    Current Facility-Administered Medications   Medication Dose Route Frequency    dextrose 5% and 0.9% NaCl infusion  50 mL/hr IntraVENous CONTINUOUS    midodrine (PROAMITINE) tablet 10 mg  10 mg Oral Q8H    fluconazole (DIFLUCAN) 200mg/100 mL IVPB (premix)  200 mg IntraVENous Q24H    albumin human 25% (BUMINATE) solution 25 g  25 g IntraVENous Q6H    meropenem (MERREM) 500 mg in 0.9% sodium chloride (MBP/ADV) 50 mL MBP  0.5 g IntraVENous Q12H    insulin lispro (HUMALOG) injection   SubCUTAneous Q6H    acetaminophen (TYLENOL) suppository 650 mg  650 mg Rectal Q6H PRN    acetaminophen (TYLENOL) tablet 500 mg  500 mg Oral Q6H PRN    linezolid (ZYVOX) IVPB premix in D5W 600 mg  600 mg IntraVENous Q12H    diclofenac (VOLTAREN) 1 % topical gel 2 g  2 g Topical Q8H PRN    allopurinol (ZYLOPRIM) tablet 50 mg  50 mg Oral DAILY    aluminum-magnesium hydroxide (MAALOX) oral suspension 15 mL  15 mL Oral QID PRN    albuterol-ipratropium (DUO-NEB) 2.5 MG-0.5 MG/3 ML  3 mL Nebulization Q4HWA RT    Lactobacillus Acidoph & Bulgar (FLORANEX) tablet 1 Tab  1 Tab Oral BID    sodium chloride (NS) flush 5-10 mL  5-10 mL IntraVENous Q8H    naloxone (NARCAN) injection 0.1 mg  0.1 mg IntraVENous Multiple  acetaminophen (TYLENOL) tablet 650 mg  650 mg Oral Q4H PRN    famotidine (PEPCID) tablet 20 mg  20 mg Oral DAILY    0.9% sodium chloride infusion 250 mL  250 mL IntraVENous PRN    diphenhydrAMINE (BENADRYL) capsule 50 mg  50 mg Oral Q4H PRN    polyethylene glycol (MIRALAX) packet 17 g  17 g Oral TID    cholecalciferol (VITAMIN D3) tablet 2,000 Units  2,000 Units Oral DAILY    docusate sodium (COLACE) capsule 100 mg  100 mg Oral DAILY AFTER BREAKFAST    oxyCODONE-acetaminophen (PERCOCET 7.5) 7.5-325 mg per tablet 1 Tab  1 Tab Oral Q4H PRN    pravastatin (PRAVACHOL) tablet 40 mg  40 mg Oral QHS    senna-docusate (PERICOLACE) 8.6-50 mg per tablet 2 Tab  2 Tab Oral PCD    folic acid (FOLVITE) tablet 1 mg  1 mg Oral DAILY    ondansetron (ZOFRAN) injection 4 mg  4 mg IntraVENous Q8H PRN    glucose chewable tablet 16 g  16 g Oral PRN    glucagon (GLUCAGEN) injection 1 mg  1 mg IntraMUSCular PRN    dextrose (D50W) injection syrg 12.5-25 g  25-50 mL IntraVENous PRN         Physical Exam:   GENERAL: alert, cooperative, no distress, appears stated age  ABDOMEN: soft, non-tender.  Bowel sounds normal. No masses,  no organomegaly  FLANK:  NON  TENDER      Lab/Data Review:  BMP:   Lab Results   Component Value Date/Time     05/16/2017 03:15 AM    K 3.7 05/16/2017 03:15 AM     05/16/2017 03:15 AM    CO2 26 05/16/2017 03:15 AM    AGAP 7 05/16/2017 03:15 AM    GLU 63 (L) 05/16/2017 03:15 AM    BUN 28 (H) 05/16/2017 03:15 AM    CREA 2.88 (H) 05/16/2017 03:15 AM    GFRAA 20 (L) 05/16/2017 03:15 AM    GFRNA 16 (L) 05/16/2017 03:15 AM     CMP:   Lab Results   Component Value Date/Time     05/16/2017 03:15 AM    K 3.7 05/16/2017 03:15 AM     05/16/2017 03:15 AM    CO2 26 05/16/2017 03:15 AM    AGAP 7 05/16/2017 03:15 AM    GLU 63 (L) 05/16/2017 03:15 AM    BUN 28 (H) 05/16/2017 03:15 AM    CREA 2.88 (H) 05/16/2017 03:15 AM    GFRAA 20 (L) 05/16/2017 03:15 AM    GFRNA 16 (L) 05/16/2017 03:15 AM CA 8.8 05/16/2017 03:15 AM    MG 2.0 05/16/2017 03:15 AM     CBC:   Lab Results   Component Value Date/Time    WBC 10.5 05/16/2017 03:15 AM    HGB 7.1 (L) 05/16/2017 03:15 AM    HCT 22.0 (L) 05/16/2017 03:15 AM     05/16/2017 03:15 AM          CT Results:    Results from Hospital Encounter encounter on 04/20/17   CT DRAIN ABS W CATH PERC   Narrative PREOPERATIVE DIAGNOSIS: Right psoas muscle abscess. POSTOPERATIVE DIAGNOSIS: Same    INDICATION: Residual right psoas muscle fluid collection. ATTENDING: Dr. Stafford Cola, M.D. Vivia Gottron: None. PROCEDURES:  1. New CT-guided drainage of a small right psoas muscle residual fluid  collection and drainage catheter placement. 2. Old right psoas muscle drainage catheter removal.    ANESTHESIA: Local 1% lidocaine as well as moderate intravenous sedation with  Versed and fentanyl given and monitored per independently trained interventional  radiology nurse under my direct supervision for 30 minutes. Please see detailed  nursing records for medication dosing. CONTRAST: None. COMPLICATIONS: None    DRAIN: No    CATHETER: None. EBL: None. SPECIMEN: None. TECHNIQUE:     All CT scans at this facility are performed using dose optimization technique as  appropriate to a performed exam, to include automated exposure control,  adjustment of the mA and/or kV according to patient size (including appropriate  matching for site-specific examinations), or use of iterative reconstruction  technique. The risks, benefits, and alternatives were discussed. Written and verbal consent  obtained. Patient was placed supine on CT table and the right lower quadrant  region was prepped and draped in usual sterile fashion. Maximum sterile barrier  technique was used. Timeout was performed. 1% lidocaine was then used. Preliminary CT imaging of the lower thoracic and lumbar spine was obtained.     Under direct CT fluoroscopy guidance using 18-gauge trocar needle was advanced  down into collection. J-wire was advanced through the needle. Serial dilatations  were performed. 8.5 [employer] all-purpose drainage catheter was placed into  collection under direct CT fluoroscopy. Distal coil was formed. Catheter was  flushed with normal saline and attached to bulb suction. External tubing was  affixed to the skin with 0 proline. Sterile dressing was applied. Old right psoas muscle drainage catheter has been removed without difficulty and  discarded. Sterile dressing was applied. Patient tolerated procedure fairly well. There were no immediate complications. Patient was transferred to recovery in stable condition. Dr. Nestor Macedo was  present and performed the procedure. FINDINGS: Initial imaging demonstrated very minimal residual right psoas muscle  fluid collection extending inferiorly. Significant reduction of the size of the  right psoas muscle is demonstrated. Previously placed drainage catheter has been  minimally pulled back. Therefore old drainage catheter was removed given its small position. New drainage catheter was placed as described above. .    CT fluoroscopic guidance demonstrated good position of the access needle as well  as new drainage catheter. Impression IMPRESSION:      1. Successful, uncomplicated new CT-guided right psoas muscle fluid collection  drainage and drainage catheter placement. 2. Successful uncomplicated removal of the old right psoas muscle drainage  catheter. PLAN: Drainage catheter should be attached to bulb suction. Outputs should be  monitored Q shift. Catheter should be flushed in the antegrade fashion with 10  cc of sterile normal saline 3 times a day. US Results:    Results from [place] encounter on 04/20/17   US ABD LTD   Narrative ULTRASOUND RIGHT UPPER QUADRANT    CPT CODE: 57193    HISTORY: Elevated LFT.     FINDINGS: Realtime sonography of the right upper quadrant demonstrates a liver  of normal echogenicity without focal abnormalities. The gallbladder is  surgically absent. The biliary ducts are nondilated. Common bile duct  measurement is 10 mm; however, the measurement is somewhat suspect without  orthogonal image confirmation. The pancreas is incompletely seen but the  portion of the head and body imaged is unremarkable. Incidental examination of  the right kidney demonstrates significantly increased parenchymal echogenicity  as well as increased pelvic lipomatosis suggesting medical renal disease with an  overall renal length of 11.2 cm. No significant free fluid is seen. Impression IMPRESSION:    Cholecystectomy; possible prominence extrahepatic biliary ducts but not  intrahepatic biliary ducts. The liver is unremarkable. The right kidney has  increased parenchymal echogenicity suggesting medical renal disease. The  pancreas is incompletely seen but the visualized portion of the head and body is  unremarkable.               Assessment:     Principal Problem:    Acute paraplegia ([address] (4/20/2017)    Active Problems:    Type 2 diabetes mellitus with diabetic neuropathy ([address] (6/28/2011)      AICD generator infection ([address] (8/20/2012)      Overview: S/p explant 4 leads 8/20/12      Benign hypertensive heart disease with systolic CHF, NYHA class 2 ([address] (9/5/2012)      Decreased calculated glomerular filtration rate (GFR) (3/30/2017)      Overview: Calculated GFR equivalent to that of CKD stage 3 = 30-59 ml/min      Iliopsoas muscle hematoma (3/30/2017)      Psoas hematoma, right, secondary to anticoagulant therapy (3/30/2017)      Impaired mobility and ADLs (3/30/2017)      History of Coumadin therapy ()      Overview: Anticoagulation for chronic atrial fibrillation; Discontinued on 3/30/2017      Sepsis ([address] (4/20/2017)      Psoas abscess, right ([address] (4/20/2017)      Krueger catheter in place on admission (4/20/2017)      Urinary tract infection due to Enterococcus (4/20/2017)      Group B streptococcal infection (4/20/2017)        Plan:     D/W   INTENSIVIST. .. TO HAVE REPEAT IMAGING  IF  ANOTHER COLLECTION. ..  CONSIDER OPEN DRAINAGE IF THIS IS THOUGHT TO BE   THE SOURCE OF ONGOING  SEPSIS    Signed By: Jenni Flores MD , FACS    May 16, 2017      PAGER:  [phone]  [facility]  OFFICE:  [address] [place] [address] [phone]

## 2017-05-16 NOTE — DIABETES MGMT
Glycemic control: Noted blood glucose trending down, pt had episode of hypoglycemia this morning with blood glucose of 63 mg/dL. Pt is receiving D5NS at 50 mL/hr. Lantus insulin discontinued. Continue inpatient monitoring and intervention.      Manfred Espinosa RD, CDE

## 2017-05-16 NOTE — PROGRESS NOTES
Dysphagia evaluation completed with recs of puree and honey-thick liquids, meds via puree. Full report to follow.      Thank you for this referral.    Myla Flores M.S. CCC-SLP/L  Speech-Language Pathologist

## 2017-05-16 NOTE — PROGRESS NOTES
ICU Progress note      Name: Georges Dougherty   : 1950   MRN: [id number]   Date: 2017     [x]I have reviewed the flowsheet and previous days notes. Events overnight reviewed and discussed with nursing staff. Vital signs and records reviewed. HPI:  Patient is a 77 y.o. female  w/hx of HTN, CHF, heart block s/p pacer, multiple vascular procedures in neck veins with residual stenoses, prior Hx of DVT, DM, Afib, recent admission for Rt psoas hematoma. Was readmitted on 17 for acute paraplegia, fevers, sweats. Repeat CT demonstrated increased size of psoas fluid collection. Labs notable for leukocytosis and LISBETH. Pt brought to IR and 300ml of pus aspirated. Hypotensive upon return to floor, and still so after ~1500 ml IVF. Pt was transferred to ICU on 17 for hypotension s/p procedure; recovered with pressor support and fluids. Pt was then transferred out of ICU floor. Last seen by Pulmonary /ICU - 17        Pulmonary was re-consulted on 17 for pt d/t SOB x2-3 days without clear cause. Pt states that her chest and abd \"hurt\", which is causing her to have SOB. Pt states the pain and SOB is worse while lying flat and taking deep breaths, improves with sitting up. Per pt and RN, pt has not eaten much the last couple of days - pain and SOB thought to possibly relate to GERD/acid reflux. Maalox was ordered for pt today to help with abd pain. In addition, workup of this SOB has included cardiac enzymes (3 sets, all negative); VQ scan - low probability for PE; bilateral venous dopplers, which shows (+) DVT in R Common Femoral Vein and (+) DVT in L popliteal vein. Pt then underwent emergent IVC placement d/t inability to anticoagulate using heparin 2/2 hx Psoas muscle hematoma. 17: much more alert this AM. GPC from 2 blood cultures from  now. Off phenylephrine.  C/O LUQ pain this AM on exam                  ROS:A comprehensive review of systems was negative except for that written in the HPI. Vital Signs:    Visit Vitals    /52 (BP 1 Location: Left arm, BP Patient Position: At rest;Head of bed elevated (Comment degrees))  Comment (BP Patient Position): 30    Pulse 92    Temp 98.4 °F (36.9 °C)    Resp 15    Ht 5' 7\" (1.702 m)    Wt 113.3 kg (249 lb 12.5 oz)    SpO2 99%    Breastfeeding No    BMI 39.12 kg/m2       O2 Device: Nasal cannula   O2 Flow Rate (L/min): 2 l/min   Temp (24hrs), Av.7 °F (37.1 °C), Min:98.4 °F (36.9 °C), Max:99.2 °F (37.3 °C)       Intake/Output:   Last shift:         Last 3 shifts:  1901 -  0700  In: 3133 [I.V.:3123]  Out: 1662 [Urine:1655; Drains:7]    Intake/Output Summary (Last 24 hours) at 17 0805  Last data filed at 05/15/17 2300   Gross per 24 hour   Intake          1722.98 ml   Output              882 ml   Net           840.98 ml           Physical Exam:    General: Appears fatigued, NAD. Pts daughter at bedside. HEENT:  Anicteric sclerae; pink palpebral conjunctivae; mucosa moist  Resp: Mod AE, no wheeze, no rhonchi  CV:  S1, S2 present; regular rate and rhythm  GI:  Abdomen soft, non-tender; (+) active bowel sounds  Extremities:  (+)1BLE , no cyanosis noted  Skin:  Warm; no rashes/ lesions noted  Neurologic: alert and oriented X 3        DATA:     Results for Ruben Milian (MRN [id number]) as of 2017 13:29   Ref.  Range 2017 04:11 2017 19:52 2017 13:42 2017 04:04 5/10/2017 03:27 5/10/2017 23:35 2017 08:20 2017 05:45   WBC Latest Ref Range: 4.6 - 13.2 K/uL 5.7  7.4 6.9  11.8 13.3 (H) 12.8       Labs: Results:       Chemistry Recent Labs      17   0315  05/15/17   0604  17   1630   GLU  63*  109*  132*   NA  137  136  135*   K  3.7  4.0  4.0   CL  104  102  100   CO2  26  25  26   BUN  28*  30*  31*   CREA  2.88*  2.98*  3.26*   CA  8.8  8.1*  8.2*   AGAP  7  9  9   BUCR  10*  10*  10*   AP   --    --   298*   TP   --    --   5.9*   ALB   --    --   1.3*   GLOB   --    -- 4.6*   AGRAT   --    --   0.3*      CBC w/Diff Recent Labs      05/16/17   0315  05/15/17   0604  05/14/17   1630   WBC  10.5  10.6  11.5   RBC  2.70*  2.67*  2.85*   HGB  7.1*  7.1*  7.6*   HCT  22.0*  21.5*  23.2*   PLT  254  233  237   GRANS  72  73  72   LYMPH  9*  14*  16*   EOS  1  3  3      Coagulation Recent Labs      05/15/17   1446   PTP  15.1   INR  1.2   APTT  25.5       Liver Enzymes Recent Labs      05/14/17   1630   TP  5.9*   ALB  1.3*   AP  298*   SGOT  83*      ABG No results found for: PH, PHI, PCO2, PCO2I, PO2, PO2I, HCO3, HCO3I, FIO2, FIO2I   Microbiology Recent Labs      05/14/17   0455  05/14/17   0432   CULT  AEROBIC BOTTLE  STAPHYLOCOCCUS SPECIES  *  AEROBIC AND ANAEROBIC BOTTLES  STAPHYLOCOCCUS SPECIES, COAGULASE NEGATIVE  *          Imaging:  [x]I have personally reviewed the patients radiographs  [x]Radiographs reviewed with radiologist    Portable chest x-ray 5/13/17     CPT code 44717      INDICATION: Wheezing     COMPARISONS: Chest x-rays 5/10/2017 and 5/12/2017     FINDINGS: Frontal view of the chest obtained at 2313 hours. The cardiac  silhouette is mildly enlarged but stable. Epicardial leads are redemonstrated. Right PICC line has been advanced into the SVC. The pulmonary vascular markings  are normal. There is a stable right pleural effusion and basilar atelectasis. The left lung is clear. Eventration of the right diaphragm is stable. There is  no pneumothorax. The osseous structures are stable.     IMPRESSION  IMPRESSION:     1. No change in right pleural effusion and atelectasis.     2. PICC line has been advanced into the SVC. Stable mild cardiomegaly. CXR 5/13/17      FINDINGS: There is a right-sided PICC line with its tip overlying the right  subclavian vein. The heart is normal in size. Status post median sternotomy. Diffuse right lung infiltrates are again seen not significantly changed.   Probable right pleural effusion.     IMPRESSION  Impression:     Diffuse right lung infiltrates not significantly changed in the one-day  interval. Probable right pleural effusion.     PICC line with its tip in the right subclavian vein. IMPRESSION:   (+) DVT -  R Common Femoral Vein and L popliteal vein; s/p IVC filter. V/Q scan on 5/11/17: low probability. With recent bleed, not a candidate for systemic anticoagulation. · Hypoxia- essentially resolved  · Sepsis - initially 2/2 infected psoas hematoma/epidural abscess (GAS) with neurological compromise- now with GPC in blood and right-sided infiltrate. ? PICC infection, ? UTI, ? HCAP, pacer infection, fungemia. Also has new LUQ pain (?splenic abscess). 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 02 at 2Lpm with      02 sat 99%. Keep HOB >90'- Asp precautions. Pulmonary toilet. Encourage IS. Continue with EZPAP- duo nebs. · CV: MAP borderline. Think PICC may require removal. May require pressors however. Will use midodrine with goal MAP >65. Midodrine not MAO inhibitor (OK with linezolid)  · GI: SLP, then advance to ADA after CT and RUQ U/S. Feeding tube tomorrow if still unable to take PO. No PPI required; on pepcid. RUQ U/S given elevated LAEs  · Renal: LISBETH since admission, ?worse after gent. No acute need for HD.  Continue IVF and 25% albumin until taking PO; straight cath x 48 hours so that urine Cx may be sent  · ID: empiric ABX (linezolid and edward). Will change latter to edward. Repeat blood Cx from 5/15 pending. Possibly pull picc depending on speciation of Cx from 5/14. CT abdomen and U/S today. Pull perez, straight cath x 48 hours, and send urine Cx in 48 hours. · Heme: known DVTs, but recent psoas bleed. Has been over a month however; drain still in place. Will start prophy heparin over next few days (depending on CT), follow Hb. Transfuse at Hb <7. IVC filter in place  · Endo: lantus stopped ad pt is NPO; re-start when consistently taking PO and able to stop D5. Q6 fingersticks. · Neuro: likely encephalopathy 2/2 sepsis. Minimize sedatives    PICC (possibly remove PICC)    Trini Freeman MD  PCCM  35 min             My assessment/plan was discussed with:  [x]nursing []PT/OT        []family []             Paul Bolivar MD         Clinical care and time spend evaluating the patient : 35 min      . Freddy Bolivar MD  Pulmonary/ Critical Care Medicine

## 2017-05-16 NOTE — PROGRESS NOTES
SUBJECTIVE:    Patient complaints of intermittent abdominal pain. No nausea or vomiting. No SOB or cough. Goes back and forth to sleep. OBJECTIVE:    Visit Vitals    /40    Pulse 84    Temp 98.8 °F (37.1 °C)    Resp 8    Ht 5' 7\" (1.702 m)    Wt 113.3 kg (249 lb 12.5 oz)    SpO2 100%    Breastfeeding No    BMI 39.12 kg/m2     RS: diminished in bases, no wheezes. CVS: RRR  GI: ND, BS +, drain +. NT   Extremities: + pedal edema  CNS: motor strength 0-1/5 in both LEs. 5/5 un UEs  General: NAD, AWAKE, Follows commands    ASSESSMENT:    1. Sepsis secondary to infected right psoas hematoma/epidural abscess with neurological compromise resulting in paraplegia. S/p CT guided right new psoas muscle drainage catheter placement and old drain removal.  2. 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 3. h/o intermittent pain at the site of abdominal pacemaker  4. Heart block s/p AICD 2005 with upgrade to BiV later that year, removed however in 2012 due to trauma and infection. New biventricular pacemaker LUQ 9/2012 by Dr. 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 5. Elevated troponin likely demand ischemia in setting of sepsis. 6. H/p transient nonischemic CMY with EF 45%  7. Acute kidney injury due to neurogenic bladder and Component of ATN. 8. Acute on chronic anemia s/p 2 units  9. Diabetes mellitus. 10.Dyslipidemia. 6. H/o HTN. 12. LBBB. 13. HALI on CPAP. 14. Left shoulder pain  15. Atypical Chest pain   16. Hypoxia due to atelectasis  17. Bilateral LE DVT s/p IVC  18. Recurrent fever  19. Positive 2/4 GPC bacteremia  20. Possible right Pleural effusion   21. Severe protein malnutrition     PLAN:    Cont antibiotic per ID  Cont current management.   Pulmonology to follow  CT scan report reviewed    --> TOTAL TIME GREATER THAN 30 MINUTES    CMP:   Lab Results   Component Value Date/Time     05/16/2017 03:15 AM    K 3.7 05/16/2017 03:15 AM     05/16/2017 03:15 AM    CO2 26 05/16/2017 03:15 AM    AGAP 7 05/16/2017 03:15 AM    GLU 63 (L) 05/16/2017 03:15 AM    BUN 28 (H) 05/16/2017 03:15 AM    CREA 2.88 (H) 05/16/2017 03:15 AM    GFRAA 20 (L) 05/16/2017 03:15 AM    GFRNA 16 (L) 05/16/2017 03:15 AM    CA 8.8 05/16/2017 03:15 AM    MG 2.0 05/16/2017 03:15 AM    ALB 2.3 (L) 05/16/2017 03:15 AM    TP 6.4 05/16/2017 03:15 AM    GLOB 4.1 (H) 05/16/2017 03:15 AM    AGRAT 0.6 (L) 05/16/2017 03:15 AM    SGOT 50 (H) 05/16/2017 03:15 AM    ALT 44 05/16/2017 03:15 AM        CBC:   Lab Results   Component Value Date/Time    WBC 10.5 05/16/2017 03:15 AM    HGB 7.1 (L) 05/16/2017 03:15 AM    HCT 22.0 (L) 05/16/2017 03:15 AM     05/16/2017 03:15 AM

## 2017-05-16 NOTE — ROUTINE PROCESS
Bedside, Verbal and Written shift change report given to Regina Truong RN (oncoming nurse) by Morgan Duggan RN   (offgoing nurse). Report included the following information SBAR, Kardex, Intake/Output, MAR, Recent Results and Cardiac Rhythm NSR with occasional PVC's. Mary Ellen Egan

## 2017-05-16 NOTE — ROUTINE PROCESS
Straight Cath done with 360 ml out of yellow urine with sediments. This is post void residual. Had another Huge BM this am. Cleans, new pads applied.

## 2017-05-17 LAB
ALBUMIN SERPL BCP-MCNC: 2.9 G/DL (ref 3.4–5)
ALBUMIN/GLOB SERPL: 0.9 {RATIO} (ref 0.8–1.7)
ALP SERPL-CCNC: 250 U/L (ref 45–117)
ALT SERPL-CCNC: 29 U/L (ref 13–56)
ANION GAP BLD CALC-SCNC: 9 MMOL/L (ref 3–18)
APPEARANCE UR: CLEAR
AST SERPL W P-5'-P-CCNC: 30 U/L (ref 15–37)
BACTERIA URNS QL MICRO: ABNORMAL /HPF
BASOPHILS # BLD AUTO: 0 K/UL (ref 0–0.06)
BASOPHILS # BLD: 0 % (ref 0–3)
BILIRUB DIRECT SERPL-MCNC: 0.3 MG/DL (ref 0–0.2)
BILIRUB SERPL-MCNC: 0.7 MG/DL (ref 0.2–1)
BILIRUB UR QL: NEGATIVE
BUN SERPL-MCNC: 22 MG/DL (ref 7–18)
BUN/CREAT SERPL: 9 (ref 12–20)
CALCIUM SERPL-MCNC: 8.7 MG/DL (ref 8.5–10.1)
CHLORIDE SERPL-SCNC: 103 MMOL/L (ref 100–108)
CO2 SERPL-SCNC: 25 MMOL/L (ref 21–32)
COLOR UR: YELLOW
CREAT SERPL-MCNC: 2.5 MG/DL (ref 0.6–1.3)
DIFFERENTIAL METHOD BLD: ABNORMAL
EOSINOPHIL # BLD: 0.1 K/UL (ref 0–0.4)
EOSINOPHIL NFR BLD: 1 % (ref 0–5)
EPITH CASTS URNS QL MICRO: ABNORMAL /LPF (ref 0–5)
ERYTHROCYTE [DISTWIDTH] IN BLOOD BY AUTOMATED COUNT: 17.5 % (ref 11.6–14.5)
GLOBULIN SER CALC-MCNC: 3.2 G/DL (ref 2–4)
GLUCOSE BLD STRIP.AUTO-MCNC: 144 MG/DL (ref 70–110)
GLUCOSE SERPL-MCNC: 131 MG/DL (ref 74–99)
GLUCOSE UR STRIP.AUTO-MCNC: 100 MG/DL
HCT VFR BLD AUTO: 20.5 % (ref 35–45)
HGB BLD-MCNC: 6.6 G/DL (ref 12–16)
HGB UR QL STRIP: ABNORMAL
KETONES UR QL STRIP.AUTO: NEGATIVE MG/DL
LEUKOCYTE ESTERASE UR QL STRIP.AUTO: NEGATIVE
LYMPHOCYTES # BLD AUTO: 11 % (ref 20–51)
LYMPHOCYTES # BLD: 1 K/UL (ref 0.8–3.5)
MAGNESIUM SERPL-MCNC: 2 MG/DL (ref 1.6–2.6)
MCH RBC QN AUTO: 26.5 PG (ref 24–34)
MCHC RBC AUTO-ENTMCNC: 32.2 G/DL (ref 31–37)
MCV RBC AUTO: 82.3 FL (ref 74–97)
MONOCYTES # BLD: 0.5 K/UL (ref 0–1)
MONOCYTES NFR BLD AUTO: 5 % (ref 2–9)
NEUTS BAND NFR BLD MANUAL: 2 % (ref 0–5)
NEUTS SEG # BLD: 7.8 K/UL (ref 1.8–8)
NEUTS SEG NFR BLD AUTO: 81 % (ref 42–75)
NITRITE UR QL STRIP.AUTO: NEGATIVE
PH UR STRIP: 5.5 [PH] (ref 5–8)
PLATELET # BLD AUTO: 217 K/UL (ref 135–420)
PLATELET COMMENTS,PCOM: ABNORMAL
PMV BLD AUTO: 8.4 FL (ref 9.2–11.8)
POTASSIUM SERPL-SCNC: 3.6 MMOL/L (ref 3.5–5.5)
PROT SERPL-MCNC: 6.1 G/DL (ref 6.4–8.2)
PROT UR STRIP-MCNC: 30 MG/DL
RBC # BLD AUTO: 2.49 M/UL (ref 4.2–5.3)
RBC #/AREA URNS HPF: ABNORMAL /HPF (ref 0–5)
RBC MORPH BLD: ABNORMAL
RBC MORPH BLD: ABNORMAL
SODIUM SERPL-SCNC: 137 MMOL/L (ref 136–145)
SP GR UR REFRACTOMETRY: 1.01 (ref 1–1.03)
UROBILINOGEN UR QL STRIP.AUTO: 0.2 EU/DL (ref 0.2–1)
WBC # BLD AUTO: 9.4 K/UL (ref 4.6–13.2)
WBC URNS QL MICRO: ABNORMAL /HPF (ref 0–4)

## 2017-05-17 PROCEDURE — 94640 AIRWAY INHALATION TREATMENT: CPT

## 2017-05-17 PROCEDURE — 74011250636 HC RX REV CODE- 250/636: Performed by: INTERNAL MEDICINE

## 2017-05-17 PROCEDURE — 36430 TRANSFUSION BLD/BLD COMPNT: CPT

## 2017-05-17 PROCEDURE — 65270000029 HC RM PRIVATE

## 2017-05-17 PROCEDURE — 80048 BASIC METABOLIC PNL TOTAL CA: CPT | Performed by: NURSE PRACTITIONER

## 2017-05-17 PROCEDURE — 74011250637 HC RX REV CODE- 250/637: Performed by: INTERNAL MEDICINE

## 2017-05-17 PROCEDURE — 74011000258 HC RX REV CODE- 258: Performed by: INTERNAL MEDICINE

## 2017-05-17 PROCEDURE — 81001 URINALYSIS AUTO W/SCOPE: CPT | Performed by: INTERNAL MEDICINE

## 2017-05-17 PROCEDURE — 36592 COLLECT BLOOD FROM PICC: CPT

## 2017-05-17 PROCEDURE — 86920 COMPATIBILITY TEST SPIN: CPT | Performed by: NURSE PRACTITIONER

## 2017-05-17 PROCEDURE — 36415 COLL VENOUS BLD VENIPUNCTURE: CPT | Performed by: NURSE PRACTITIONER

## 2017-05-17 PROCEDURE — 74011250637 HC RX REV CODE- 250/637: Performed by: HOSPITALIST

## 2017-05-17 PROCEDURE — 82962 GLUCOSE BLOOD TEST: CPT

## 2017-05-17 PROCEDURE — 97164 PT RE-EVAL EST PLAN CARE: CPT

## 2017-05-17 PROCEDURE — 86900 BLOOD TYPING SEROLOGIC ABO: CPT | Performed by: NURSE PRACTITIONER

## 2017-05-17 PROCEDURE — 74011000258 HC RX REV CODE- 258: Performed by: NURSE PRACTITIONER

## 2017-05-17 PROCEDURE — 83735 ASSAY OF MAGNESIUM: CPT | Performed by: NURSE PRACTITIONER

## 2017-05-17 PROCEDURE — 85025 COMPLETE CBC W/AUTO DIFF WBC: CPT | Performed by: NURSE PRACTITIONER

## 2017-05-17 PROCEDURE — 80076 HEPATIC FUNCTION PANEL: CPT | Performed by: NURSE PRACTITIONER

## 2017-05-17 PROCEDURE — 87086 URINE CULTURE/COLONY COUNT: CPT | Performed by: INTERNAL MEDICINE

## 2017-05-17 PROCEDURE — P9016 RBC LEUKOCYTES REDUCED: HCPCS | Performed by: NURSE PRACTITIONER

## 2017-05-17 PROCEDURE — 74011250636 HC RX REV CODE- 250/636: Performed by: HOSPITALIST

## 2017-05-17 PROCEDURE — P9047 ALBUMIN (HUMAN), 25%, 50ML: HCPCS | Performed by: INTERNAL MEDICINE

## 2017-05-17 PROCEDURE — 77030034850

## 2017-05-17 PROCEDURE — 74011000250 HC RX REV CODE- 250: Performed by: UROLOGY

## 2017-05-17 PROCEDURE — 65270000032 HC RM SEMIPRIVATE

## 2017-05-17 PROCEDURE — 92526 ORAL FUNCTION THERAPY: CPT

## 2017-05-17 RX ORDER — SODIUM CHLORIDE 9 MG/ML
250 INJECTION, SOLUTION INTRAVENOUS AS NEEDED
Status: DISCONTINUED | OUTPATIENT
Start: 2017-05-17 | End: 2017-05-25 | Stop reason: HOSPADM

## 2017-05-17 RX ORDER — POLYETHYLENE GLYCOL 3350 17 G/17G
17 POWDER, FOR SOLUTION ORAL EVERY 4 HOURS
Status: ACTIVE | OUTPATIENT
Start: 2017-05-17 | End: 2017-05-17

## 2017-05-17 RX ADMIN — LACTOBACILLUS TAB 1 TABLET: TAB at 18:00

## 2017-05-17 RX ADMIN — ALLOPURINOL 50 MG: 100 TABLET ORAL at 10:46

## 2017-05-17 RX ADMIN — POLYETHYLENE GLYCOL 3350 17 G: 17 POWDER, FOR SOLUTION ORAL at 16:44

## 2017-05-17 RX ADMIN — IPRATROPIUM BROMIDE AND ALBUTEROL SULFATE 3 ML: 2.5; .5 SOLUTION RESPIRATORY (INHALATION) at 16:26

## 2017-05-17 RX ADMIN — MEROPENEM 500 MG: 500 INJECTION, POWDER, FOR SOLUTION INTRAVENOUS at 22:46

## 2017-05-17 RX ADMIN — Medication 2 TABLET: at 19:47

## 2017-05-17 RX ADMIN — ALBUMIN (HUMAN) 25 G: 0.25 INJECTION, SOLUTION INTRAVENOUS at 05:09

## 2017-05-17 RX ADMIN — Medication 10 ML: at 05:16

## 2017-05-17 RX ADMIN — DOCUSATE SODIUM 100 MG: 100 CAPSULE, LIQUID FILLED ORAL at 11:17

## 2017-05-17 RX ADMIN — MIDODRINE HYDROCHLORIDE 10 MG: 2.5 TABLET ORAL at 17:00

## 2017-05-17 RX ADMIN — DEXTROSE MONOHYDRATE AND SODIUM CHLORIDE 50 ML/HR: 5; .9 INJECTION, SOLUTION INTRAVENOUS at 01:49

## 2017-05-17 RX ADMIN — Medication 10 ML: at 22:52

## 2017-05-17 RX ADMIN — FAMOTIDINE 20 MG: 20 TABLET ORAL at 10:47

## 2017-05-17 RX ADMIN — FOLIC ACID 1 MG: 1 TABLET ORAL at 10:47

## 2017-05-17 RX ADMIN — Medication 10 ML: at 16:43

## 2017-05-17 RX ADMIN — PRAVASTATIN SODIUM 40 MG: 20 TABLET ORAL at 22:47

## 2017-05-17 RX ADMIN — LACTOBACILLUS TAB 1 TABLET: TAB at 10:47

## 2017-05-17 RX ADMIN — LINEZOLID 600 MG: 600 INJECTION, SOLUTION INTRAVENOUS at 05:10

## 2017-05-17 RX ADMIN — POLYETHYLENE GLYCOL 3350 17 G: 17 POWDER, FOR SOLUTION ORAL at 22:46

## 2017-05-17 RX ADMIN — IPRATROPIUM BROMIDE AND ALBUTEROL SULFATE 3 ML: 2.5; .5 SOLUTION RESPIRATORY (INHALATION) at 11:58

## 2017-05-17 RX ADMIN — MIDODRINE HYDROCHLORIDE 10 MG: 2.5 TABLET ORAL at 01:45

## 2017-05-17 RX ADMIN — CHOLECALCIFEROL TAB 25 MCG (1000 UNIT) 2000 UNITS: 25 TAB at 10:47

## 2017-05-17 RX ADMIN — FLUCONAZOLE 200 MG: 2 INJECTION INTRAVENOUS at 10:45

## 2017-05-17 RX ADMIN — MEROPENEM 500 MG: 500 INJECTION, POWDER, FOR SOLUTION INTRAVENOUS at 10:46

## 2017-05-17 RX ADMIN — POLYETHYLENE GLYCOL 3350 17 G: 17 POWDER, FOR SOLUTION ORAL at 10:46

## 2017-05-17 RX ADMIN — IPRATROPIUM BROMIDE AND ALBUTEROL SULFATE 3 ML: 2.5; .5 SOLUTION RESPIRATORY (INHALATION) at 08:00

## 2017-05-17 NOTE — PROGRESS NOTES
RENAL DAILY PROGRESS NOTE    Patient: Patricia Hicks               Sex: female          DOA: 4/20/2017  3:36 PM        YOB: 1950      Age:  77 y.o.        LOS:  LOS: 27 days     Subjective:     Patricia Hicks is a 77 y.o.  who presents with Acute paraplegia  Acute paraplegia ([facility] [address] Asked to evaluate for acute/crf stage 3.admitted with paraplegias,psoas muscle hematoma,s/p drain placement. hx of dm,gout  Chief complains: Patient c/o abd pain  - Reviewed last 24 hrs events     Current Facility-Administered Medications   Medication Dose Route Frequency    0.9% sodium chloride infusion 250 mL  250 mL IntraVENous PRN    polyethylene glycol (MIRALAX) packet 17 g  17 g Oral Q4H    midodrine (PROAMITINE) tablet 10 mg  10 mg Oral Q8H    fluconazole (DIFLUCAN) 200mg/100 mL IVPB (premix)  200 mg IntraVENous Q24H    meropenem (MERREM) 500 mg in 0.9% sodium chloride (MBP/ADV) 50 mL MBP  0.5 g IntraVENous Q12H    insulin lispro (HUMALOG) injection   SubCUTAneous Q6H    acetaminophen (TYLENOL) suppository 650 mg  650 mg Rectal Q6H PRN    acetaminophen (TYLENOL) tablet 500 mg  500 mg Oral Q6H PRN    diclofenac (VOLTAREN) 1 % topical gel 2 g  2 g Topical Q8H PRN    allopurinol (ZYLOPRIM) tablet 50 mg  50 mg Oral DAILY    aluminum-magnesium hydroxide (MAALOX) oral suspension 15 mL  15 mL Oral QID PRN    albuterol-ipratropium (DUO-NEB) 2.5 MG-0.5 MG/3 ML  3 mL Nebulization Q4HWA RT    Lactobacillus Acidoph & Bulgar (FLORANEX) tablet 1 Tab  1 Tab Oral BID    sodium chloride (NS) flush 5-10 mL  5-10 mL IntraVENous Q8H    naloxone (NARCAN) injection 0.1 mg  0.1 mg IntraVENous Multiple    acetaminophen (TYLENOL) tablet 650 mg  650 mg Oral Q4H PRN    famotidine (PEPCID) tablet 20 mg  20 mg Oral DAILY    0.9% sodium chloride infusion 250 mL  250 mL IntraVENous PRN    diphenhydrAMINE (BENADRYL) capsule 50 mg  50 mg Oral Q4H PRN    polyethylene glycol (MIRALAX) packet 17 g  17 g Oral TID    cholecalciferol (VITAMIN D3) tablet 2,000 Units  2,000 Units Oral DAILY    docusate sodium (COLACE) capsule 100 mg  100 mg Oral DAILY AFTER BREAKFAST    oxyCODONE-acetaminophen (PERCOCET 7.5) 7.5-325 mg per tablet 1 Tab  1 Tab Oral Q4H PRN    pravastatin (PRAVACHOL) tablet 40 mg  40 mg Oral QHS    senna-docusate (PERICOLACE) 8.6-50 mg per tablet 2 Tab  2 Tab Oral PCD    folic acid (FOLVITE) tablet 1 mg  1 mg Oral DAILY    ondansetron (ZOFRAN) injection 4 mg  4 mg IntraVENous Q8H PRN    glucose chewable tablet 16 g  16 g Oral PRN    glucagon (GLUCAGEN) injection 1 mg  1 mg IntraMUSCular PRN    dextrose (D50W) injection syrg 12.5-25 g  25-50 mL IntraVENous PRN       Objective:     Visit Vitals    /73    Pulse 75    Temp 98.5 °F (36.9 °C)    Resp 20    Ht 5' 7\" (1.702 m)    Wt 117.4 kg (258 lb 12.8 oz)    SpO2 97%    Breastfeeding No    BMI 40.53 kg/m2       Intake/Output Summary (Last 24 hours) at 05/17/17 1322  Last data filed at 05/17/17 1304   Gross per 24 hour   Intake          1714.17 ml   Output             1763 ml   Net           -48.83 ml       Physical Examination:     RS: Chest is bilateral equal, no wheezing / rales / crackles  CVS: S1-S2 heard, RRR, No S3 / murmur  Abdomen: sl tender  Extremities: + edema  CNS: Awake & follows commands, CN II-XII are grossly intact. HEENT: Head is atraumatic, PERRLA, conjunctiva pink & non icteric. No JVD or carotid bruit   Musculoskeletal: No gross joints or bone deformities   Lymph Node: No palpable cervical, axillary or groin lymphadenopathy.       Data Review:      Labs:     Hematology:   Recent Labs      05/17/17   0142  05/16/17   0315  05/15/17   0604  05/14/17   1630   WBC  9.4  10.5  10.6  11.5   HGB  6.6*  7.1*  7.1*  7.6*   HCT  20.5*  22.0*  21.5*  23.2*     Chemistry:   Recent Labs      05/17/17   0142  05/16/17   0315  05/15/17   0604  05/14/17   1630   BUN  22*  28*  30*  31*   CREA  2.50*  2.88*  2.98*  3.26*   CA  8.7  8.8  8.1* 8.2*   ALB  2.9*  2.3*   --   1.3*   K  3.6  3.7  4.0  4.0   NA  137  137  136  135*   CL  103  104  102  100   CO2  25  26  25  26   PHOS   --    --   4.0   --    GLU  131*  63*  109*  132*        Images:    XR (Most Recent). CXR reviewed by me and compared with previous CXR   Results from Hospital Encounter encounter on 04/20/17   XR CHEST PORT   Narrative CHEST    CPT CODE: 49377    HISTORY: Pneumonia, effusion. FINDINGS: Single AP portable view of chest at 0451 demonstrates a right arm PICC  with tip at the lateral rib margin, probable distal subclavian vein. There is  been some interval decrease in right base hazy opacity, possible small  improvement in pleural effusion. There is mild elevation right hemidiaphragm. The left lung is grossly clear. Mediastinum is not widened but the heart may be  at the upper limits of normal in size. The bones and soft tissues are  unremarkable except for sternal wires from prior median sternotomy. There is no  other change from 14 May 2017. Impression IMPRESSION:    Some improved right lung aeration, possible decreased right pleural  effusion/infiltrates. Left lung grossly clear. CT (Most Recent)   Results from Hospital Encounter encounter on 04/20/17   CT ABD PELV WO CONT   Narrative CT abdomen and pelvis without IV or oral contrast    INDICATION: Evaluate fluid collection and new left upper quadrant pain. Comparison May 4, 2017    All CT scans at this facility are performed using dose optimization technique as  appropriate to a performed exam, to include automated exposure control,  adjustment of the mA and/or kV according to patient size (including appropriate  matching for site specific examination) or use of iterative reconstruction  technique. More consolidation/pneumonia in the right lower lobe. Less severe left lower  lobe pneumonia when compared to prior study. No significant effusion. Cardiomegaly with no pericardial effusion.     The right psoas muscle is slightly smaller. Draining catheter is still in  place. No large fluid collection surrounding the catheter. Small residual  collection not completely excluded without IV contrast.    Also slightly smaller presacral fluid. Small abscess or loculation not  completely excluded but no focal collection identified. No new abnormalities in the left upper quadrant. No left upper quadrant  inflammation or free air. Splenomegaly is unchanged. Pancreas, liver, adrenal  glands and kidneys are stable with large left renal cyst. Aorta is normal in  caliber. IVC filter in place. More subcutaneous edema/anasarca along both sides. No discrete fluid collection. No bowel obstruction. Fluid in the colon, mild colitis, diarrheal disease  possible. However, moderate fecal material retention in the rectosigmoid colon  and descending colon noted. Impression IMPRESSION:  1. Smaller right psoas muscle implying smaller intramuscular fluid collection  and less edema/inflammation. Draining catheter is still in place. Limited  evaluation without IV contrast. Slightly smaller presacral fluid with no  discrete collection. 2. Less severe left lower lobe pulmonary consolidation but more severe right  lower lobe consolidation. No pleural effusion. 3. Worsening anasarca/subcutaneous edema on both sides. 4. More fluid in the ascending and transverse colon, colitis or diarrheal  disease? EKG Results for orders placed or performed in visit on 11/01/16   AMB POC EKG ROUTINE W/ 12 LEADS, INTER & REP     Status: None    Narrative    AV sequentially paced rhythm with a rate of 67. I have personally reviewed the old medical records and patient's labs    Plan / Recommendation:      1. Acute/crf stage 3.neurogenic bladder. sepsis. improving. nurses are doing int cath   2.anemia,consider transfusion  3. infected psoas muscle hematoma,s/p drain placement,  3.uti,pneumonia  4.adjusted meds for renal failure    D/w Dr. Anastacia Deng MD  Nephrology  5/17/2017

## 2017-05-17 NOTE — PROGRESS NOTES
Problem: Dysphagia (Adult)  Goal: *Acute Goals and Plan of Care (Insert Text)  Patient will:  1. Tolerate PO trials with 0 s/s overt distress in 4/5 trials  2. Utilize compensatory swallow strategies/maneuvers (decrease bite/sip, size/rate, alt. liq/sol) with min cues in 4/5 trials  3. Perform oral-motor/laryngeal exercises to increase oropharyngeal swallow function with min cues  4. Complete an objective swallow study (i.e., MBSS) to assess swallow integrity, r/o aspiration, and determine of safest LRD, min A     Rec:   Mech soft, thin liquids; Meds as tolerated   Assistance with all intake   Aspiration precautions  HOB >45 during po intake, remain >30 for 30-45 minutes after po   Small bites/sips; alternate liquid/solid with slow feeding rate   Oral care TID  [id number] [address] TREATMENT     Patient: Clarence Castillo (82 y.o. female)  Date: 5/17/2017  Diagnosis: Acute paraplegia  Acute paraplegia (HCC) Acute paraplegia (HCC)  Precautions: Aspiration, Fall, Skin      ASSESSMENT:  Pt seen for follow up dysphagia tx with pt drowsy, reporting pain \"all over my body\". Per RN, pt did not eat any breakfast this am.  Pt assisted with lunch tray, demo minimal intake (less than 10% of puree). Pt offered mech soft trials, demo labored mastication; however, able to clear with 100% oral clearance and no overt s/sx aspiration. Pt tolerating thin and NTL +/- straw with timely swallow initiation and adequate laryngeal elevation to palpation and no overt s/sx aspiration across multiple trials. Pt safe for mech soft diet with thin liquids with use of the above mentioned compensatory strategies/aspiration precautions. ST will continue to follow for further dysphagia management. D/w RN.     Progression toward goals:  [ ]         Improving appropriately and progressing toward goals  [X]         Improving slowly and progressing toward goals  [ ]         Not making progress toward goals and plan of care will be adjusted PLAN:  Recommendations and Planned Interventions:  Patient continues to benefit from skilled intervention to address the above impairments. Continue treatment per established plan of care. Discharge Recommendations: To Be Determined       SUBJECTIVE:   Patient stated I don't want anymore. OBJECTIVE:   Cognitive and Communication Status:  Neurologic State: Drowsy  Orientation Level: Oriented to person, Oriented to place, Disoriented to time, Disoriented to situation  Cognition: Decreased command following, Decreased attention/concentration  Perception: Appears intact  Perseveration: No perseveration noted  Safety/Judgement: Awareness of environment, Fall prevention  Dysphagia Treatment:  Oral Assessment:  Oral Assessment  Labial: Decreased rate  Dentition: Natural  Oral Hygiene: adequate  Lingual: Decreased strength  Velum: No impairment  Mandible: No impairment  P.O. Trials:              Patient Position: 50 at [facility]              Vocal quality prior to P.O.: Low volume              Consistency Presented:  Thin liquid, Solid              How Presented: Self-fed/presented, Cup/sip, Spoon, Straw, Successive swallows              Bolus Acceptance: No impairment              Bolus Formation/Control: Impaired              Type of Impairment: Mastication (Slowed)              Propulsion: Delayed (# of seconds)              Oral Residue: Less than 10% of bolus, Lingual              Initiation of Swallow: No impairment              Laryngeal Elevation: Functional              Aspiration Signs/Symptoms: None              Pharyngeal Phase Characteristics: Poor endurance              Effective Modifications: Small sips and bites, Alternate liquids/solids (Slow rate of intake)              Cues for Modifications: Minimal              Oral Phase Severity: Mild-moderate              Pharyngeal Phase Severity : Mild                PAIN:  Pt unable to quantify pain level; however, reporting pain level prior to and following treatment. After treatment:   [ ]              Patient left in no apparent distress sitting up in chair  [X]              Patient left in no apparent distress in bed  [X]              Call bell left within reach  [X]              Nursing notified  [ ]              Caregiver present  [ ]              Bed alarm activated         COMMUNICATION/EDUCATION:   [X]              SLP educated pt with regard to compensatory swallow strategies and                   aspiration/reflux precautions including: small bites/sips,                   alternate liquids/solids, decrease feeding rate, HOB > 45 with all po, and                   upright in bed at 30 degrees after po for at least 45                   minutes.       Deidre Rosales M.S., [id number] [facility]  Speech-Language Pathologist

## 2017-05-17 NOTE — PROGRESS NOTES
[facility]  Progress Note    Patient: Cuate Shaikh Age: 77 y.o. : 1950 MR#: [id number] SSN: [id number]  Date/Time: 2017 3:35 PM    Subjective:     Denies F/C, N/V, CP, SOB, abd pain. Assessment/Plan:   1. Sepsis due to infected R psoas hematoma/epidural abscess - with neurologic compromise, paraplegia. Is s/p CT-guided drainage catheter, old drain removal on , growing grp B beta-hemo Strep. Continue abx per ID.   2. 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 3. LISBETH with ATN on CKDz stage 3 - due to neurogenic bladder. Intermittent cath. BUN/Cr falling, following. 4. DM - SSI. BSugars controlled. 5. HTN - BPs elevated, following. 6. Dyslipidemia - statin. 7. Elevated trop I - due to demand ischemia, #1.  8. BLE DVT - s/p IVCFilter placement . No OAC due to #1/hematoma. 9. Severe prt-shelton malnutrition - supplement. 10. Grp B beta-hemo Strep bacteremia - due to #1. Cultures reviewed. Most recent 5/15 blood cx NGTD,  with 3/4 bottles CNStaph. 11. Aspiration PNA vs HCAPNA - continue Merrem per ID. 12. Possible candida cystitis - fluconazole. 13. Constipation - enema prn, MiraLax. 14. Infected pacemaker pocket - s/p tx. 15. Xfusing PRBC for anemia - chronically low, Hgb @ 6.6 this AM. Appears hemodynamically stable.     Additional Notes:      Case discussed with:  [x]Patient  []Family  []Nursing  []Case Management  DVT Prophylaxis:  []Lovenox  []Hep SQ  [x]SCDs  []Coumadin   []On Heparin gtt    Objective:   VS:   Visit Vitals    /73    Pulse 100    Temp 98.4 °F (36.9 °C)    Resp 22    Ht 5' 7\" (1.702 m)    Wt 117.4 kg (258 lb 12.8 oz)    SpO2 97%    Breastfeeding No    BMI 40.53 kg/m2      Tmax/24hrs: Temp (24hrs), Av.7 °F (37.1 °C), Min:98.2 °F (36.8 °C), Max:99.6 °F (37.6 °C)    Intake/Output Summary (Last 24 hours) at 17 1535  Last data filed at 17 1326   Gross per 24 hour   Intake          2028.77 ml   Output             1763 ml   Net           265.77 ml       General:  Awake, alert, NAD. Cardiovascular:  RRR. Pulmonary:  CTA B ant.   GI:  Soft, NT/ND, NABS. RLQ drain site c/d/i, bulb empty. Extremities:  No CT or edema. Additional:      Labs:    Recent Results (from the past 24 hour(s))   GLUCOSE, POC    Collection Time: 05/16/17  5:53 PM   Result Value Ref Range    Glucose (POC) 114 (H) 70 - 110 mg/dL   GLUCOSE, POC    Collection Time: 05/16/17 11:53 PM   Result Value Ref Range    Glucose (POC) 143 (H) 70 - 110 mg/dL   CBC WITH AUTOMATED DIFF    Collection Time: 05/17/17  1:42 AM   Result Value Ref Range    WBC 9.4 4.6 - 13.2 K/uL    RBC 2.49 (L) 4.20 - 5.30 M/uL    HGB 6.6 (L) 12.0 - 16.0 g/dL    HCT 20.5 (L) 35.0 - 45.0 %    MCV 82.3 74.0 - 97.0 FL    MCH 26.5 24.0 - 34.0 PG    MCHC 32.2 31.0 - 37.0 g/dL    RDW 17.5 (H) 11.6 - 14.5 %    PLATELET 440 283 - 340 K/uL    MPV 8.4 (L) 9.2 - 11.8 FL    NEUTROPHILS 81 (H) 42 - 75 %    BAND NEUTROPHILS 2 0 - 5 %    LYMPHOCYTES 11 (L) 20 - 51 %    MONOCYTES 5 2 - 9 %    EOSINOPHILS 1 0 - 5 %    BASOPHILS 0 0 - 3 %    ABS. NEUTROPHILS 7.8 1.8 - 8.0 K/UL    ABS. LYMPHOCYTES 1.0 0.8 - 3.5 K/UL    ABS. MONOCYTES 0.5 0 - 1.0 K/UL    ABS. EOSINOPHILS 0.1 0.0 - 0.4 K/UL    ABS.  BASOPHILS 0.0 0.0 - 0.06 K/UL    DF AUTOMATED      PLATELET COMMENTS ADEQUATE PLATELETS      RBC COMMENTS ANISOCYTOSIS  1+        RBC COMMENTS STOMATOCYTES  1+       METABOLIC PANEL, BASIC    Collection Time: 05/17/17  1:42 AM   Result Value Ref Range    Sodium 137 136 - 145 mmol/L    Potassium 3.6 3.5 - 5.5 mmol/L    Chloride 103 100 - 108 mmol/L    CO2 25 21 - 32 mmol/L    Anion gap 9 3.0 - 18 mmol/L    Glucose 131 (H) 74 - 99 mg/dL    BUN 22 (H) 7.0 - 18 MG/DL    Creatinine 2.50 (H) 0.6 - 1.3 MG/DL    BUN/Creatinine ratio 9 (L) 12 - 20      GFR est AA 23 (L) >60 ml/min/1.73m2    GFR est non-AA 19 (L) >60 ml/min/1.73m2    Calcium 8.7 8.5 - 10.1 MG/DL MAGNESIUM    Collection Time: 05/17/17  1:42 AM   Result Value Ref Range    Magnesium 2.0 1.6 - 2.6 mg/dL   HEPATIC FUNCTION PANEL    Collection Time: 05/17/17  1:42 AM   Result Value Ref Range    Protein, total 6.1 (L) 6.4 - 8.2 g/dL    Albumin 2.9 (L) 3.4 - 5.0 g/dL    Globulin 3.2 2.0 - 4.0 g/dL    A-G Ratio 0.9 0.8 - 1.7      Bilirubin, total 0.7 0.2 - 1.0 MG/DL    Bilirubin, direct 0.3 (H) 0.0 - 0.2 MG/DL    Alk. phosphatase 250 (H) 45 - 117 U/L    AST (SGOT) 30 15 - 37 U/L    ALT (SGPT) 29 13 - 56 U/L   TYPE & CROSSMATCH    Collection Time: 05/17/17  5:10 AM   Result Value Ref Range    Crossmatch Expiration 05/20/2017     ABO/Rh(D) O POSITIVE     Antibody screen NEG     CALLED TO: CCERLINDA WASHINGTON ON 05/17/2017 AT 0734 BY [id number].      Unit number [id number]     Blood component type RC LR AS1     Unit division 00     Status of unit ISSUED     Crossmatch result Compatible    GLUCOSE, POC    Collection Time: 05/17/17  5:19 AM   Result Value Ref Range    Glucose (POC) 144 (H) 70 - 110 mg/dL   URINALYSIS W/MICROSCOPIC    Collection Time: 05/17/17  1:00 PM   Result Value Ref Range    Color YELLOW      Appearance CLEAR      Specific gravity 1.014 1.005 - 1.030      pH (UA) 5.5 5.0 - 8.0      Protein 30 (A) NEG mg/dL    Glucose 100 (A) NEG mg/dL    Ketone NEGATIVE  NEG mg/dL    Bilirubin NEGATIVE  NEG      Blood SMALL (A) NEG      Urobilinogen 0.2 0.2 - 1.0 EU/dL    Nitrites NEGATIVE  NEG      Leukocyte Esterase NEGATIVE  NEG      WBC 0 to 2 0 - 4 /hpf    RBC 0 to 1 0 - 5 /hpf    Epithelial cells FEW 0 - 5 /lpf    Bacteria FEW (A) NEG /hpf       Signed By: Dwain Pike MD     May 17, 2017 3:35 PM

## 2017-05-17 NOTE — PROGRESS NOTES
Infectious Disease Progress Note    Requested by: Dr. Sallie Councilman, dr. Tegan Salguero    Reason: sepsis, acute paraplegia    Current abx Prior abx   Meropenem since 5/15  linezolid since 5/14 Cefepime 4/20-4/21  vancomycin  4/20-4/24  Gentamicin 4/21-4/24  Metronidazole  4/20-4/24  Ceftriaxone  4/24- 5/15     Lines:   PICC RUE 4/26    Assessment :    77 y.o., right handed female, with an established history of hypertension, complete heart block with permanent pacemaker placed, diabetes mellitus, diabetic peripheral neuropathy, obesity, admitted to [facility] on 4/20/17. Clinical presentation consistent with  Sepsis (POA)  secondary to group B streptococcus bloodstream infection (positive blood cultures 4/20, negative blood cultures 4/21). Most likely source of bloodstream infection is infected right psoas hematoma/abscess. 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 No signs of neurological recovery on today's exam     2 week h/o pain at the site of abdominal pacemaker, tenderness at the site - however, patient doesn't have erythema at the site of pacemaker. No fluid collection noted at pacemaker pocket site per usg 4/24. Quick clearance of bacteremia would argue against endocarditis/endovascular infection. At this time risk of removal of pacemaker/general pocket change exceed the benefit. Discussed with cardiology. Would recommend close monitoring. Abdominal usg 4/24 doesn't reveal evidence of abscess/fluid collection at the site of pacemaker pocket. Enterococcus in urine cultures 4/20 likely colonizer    Acute renal failure: nephrology consult appreciated. Difficult to determine exact etiology of ARF at this time. Slight improvement in creatinine today.      Low grade fever overnight - Ct scan 5/4 reveals increasing focal area within the right psoas muscle inferior to the previously drained area. could represent either spread of infection inferiorly or intramuscular hematoma. S/p ct guided IR drain check - old drain removed. New drain placed inferiorly on 5/10    No evidence of pneumonia    High grade fevers with tm:102 on 5/10, tm:103 on 5/13 - ?due to infected picc line. two of two sets of blood cultures 5/14 positive for coagulase negative staphylococcus. ?right sided aspiration pneumonia - unable to perform thoracentesis since inadequate fluid. One of two sets of blood cultures could be contaminant. Will need to follow repeat blood cultures to determine this. Acute renal failure: multifactorial - nephrology consult appreciated     bilateral LE dvt - s/p IVC filter placement 5/11    No leukocytosis     Resolved fevers. Mid abdominal pain today. No evidence of abdominal wall cellulitis noted on ct scan. Fecal impaction rectosigmoid - could be contributing to abdominal pain. Improved psoas edema/inflammation    Recommendations:    1. cont meropenem for aspiration/hcap, cont fluconazole to cover for possible candida cystitis  2. discontinue linezolid   3. Obtain urine cultures  4. Ok to leave picc line for now  5. mx of fecal impaction per primary team  6. F/u temp, clinically    Above plan was discussed in details with rn, . Please call me if any further questions or concerns. Will continue to participate in the care of this patient. subjective:    C/o midabdominal pain. Denies cp, sob. Unable to move legs. No nausea, vomiting. No new rash/itching/joint pain/back pain. Home Medication List    Details   gabapentin (NEURONTIN) 400 mg capsule Take 1 Cap by mouth two (2) times a day. Indications: NEUROPATHIC PAIN  Qty: 30 Cap, Refills: 0    Associated Diagnoses: Iliopsoas muscle hematoma, right, subsequent encounter      cholecalciferol (VITAMIN D3) 1,000 unit tablet Take 2 Tabs by mouth daily.  Indications: PREVENTION OF VITAMIN D DEFICIENCY  Qty: 30 Tab, Refills: 0      SITagliptin (JANUVIA) 50 mg tablet Take 50 mg by mouth daily. Indications: type 2 diabetes mellitus      potassium chloride (KLOR-CON M20) 20 mEq tablet Take 20 mEq by mouth two (2) times a day. Indications: HYPOKALEMIA PREVENTION      ondansetron (ZOFRAN ODT) 4 mg disintegrating tablet Take 4 mg by mouth every eight (8) hours as needed for Nausea. cyclobenzaprine (FLEXERIL) 10 mg tablet Take 10 mg by mouth as needed for Muscle Spasm(s). Indications: MUSCLE SPASM      carvedilol (COREG) 6.25 mg tablet Take 1 Tab by mouth two (2) times daily (with meals). Indications: hypertension  Qty: 30 Tab, Refills: 0    Associated Diagnoses: Benign hypertensive heart disease with systolic CHF, NYHA class 2 (HCC)      acetaminophen (TYLENOL) 325 mg tablet Take 2 Tabs by mouth every four (4) hours as needed (for fever or pain level less than 5/10). Indications: Fever, Pain  Qty: 30 Tab, Refills: 0    Associated Diagnoses: Iliopsoas muscle hematoma, right, subsequent encounter      allopurinol (ZYLOPRIM) 100 mg tablet Take 1 Tab by mouth daily. Indications: GOUT  Qty: 15 Tab, Refills: 0    Associated Diagnoses: Chronic gout, unspecified cause, unspecified site      insulin glargine (LANTUS) 100 unit/mL injection 15 Units by SubCUTAneous route nightly. Indications: type 2 diabetes mellitus  Qty: 1 Vial, Refills: 0    Associated Diagnoses: Type 2 diabetes mellitus with diabetic neuropathy, with long-term current use of insulin ([facility])      docusate sodium (COLACE) 100 mg capsule Take 1 Cap by mouth daily (after breakfast). Indications: Constipation  Qty: 15 Cap, Refills: 0      senna-docusate (PERICOLACE) 8.6-50 mg per tablet Take 2 Tabs by mouth daily (after dinner). Indications: Constipation  Qty: 30 Tab, Refills: 0      oxyCODONE-acetaminophen (PERCOCET 7.5) 7.5-325 mg per tablet Take 1 Tab by mouth every four (4) hours as needed (for pain level greater than 4/10). Max Daily Amount: 6 Tabs.  Indications: Pain  Qty: 50 Tab, Refills: 0    Associated Diagnoses: Iliopsoas muscle hematoma, right, subsequent encounter      bumetanide (BUMEX) 1 mg tablet TAKE 1 TABLET TWICE A DAY  Qty: 180 Tab, Refills: 1      pravastatin (PRAVACHOL) 40 mg tablet Take 40 mg by mouth nightly. Indications: DYSLIPIDEMIA      ferrous sulfate 325 mg (65 mg iron) tablet Take 1 Tab by mouth two (2) times daily (with meals).   Qty: 30 Tab, Refills: 0      insulin aspart (NOVOLOG) 100 unit/mL injection INITIATE INSULIN CORRECTIVE PROTOCOL (HR): Normal Insulin Sensitivity  For Blood Sugar (mg/dL) of:    Less than 150 =   0 units          150 -199 =   2 units 200 -249 =   4 units 250 -299 =   6 units 300 -349 =   8 units 350 and above =   10 units If 2 glucose readings are above 200 mg/dL  Qty: 10 mL, Refills: 6             Current Facility-Administered Medications   Medication Dose Route Frequency    0.9% sodium chloride infusion 250 mL  250 mL IntraVENous PRN    midodrine (PROAMITINE) tablet 10 mg  10 mg Oral Q8H    fluconazole (DIFLUCAN) 200mg/100 mL IVPB (premix)  200 mg IntraVENous Q24H    meropenem (MERREM) 500 mg in 0.9% sodium chloride (MBP/ADV) 50 mL MBP  0.5 g IntraVENous Q12H    insulin lispro (HUMALOG) injection   SubCUTAneous Q6H    acetaminophen (TYLENOL) suppository 650 mg  650 mg Rectal Q6H PRN    acetaminophen (TYLENOL) tablet 500 mg  500 mg Oral Q6H PRN    linezolid (ZYVOX) IVPB premix in D5W 600 mg  600 mg IntraVENous Q12H    diclofenac (VOLTAREN) 1 % topical gel 2 g  2 g Topical Q8H PRN    allopurinol (ZYLOPRIM) tablet 50 mg  50 mg Oral DAILY    aluminum-magnesium hydroxide (MAALOX) oral suspension 15 mL  15 mL Oral QID PRN    albuterol-ipratropium (DUO-NEB) 2.5 MG-0.5 MG/3 ML  3 mL Nebulization Q4HWA RT    Lactobacillus Acidoph & Bulgar (FLORANEX) tablet 1 Tab  1 Tab Oral BID    sodium chloride (NS) flush 5-10 mL  5-10 mL IntraVENous Q8H    naloxone (NARCAN) injection 0.1 mg  0.1 mg IntraVENous Multiple    acetaminophen (TYLENOL) tablet 650 mg  650 mg Oral Q4H PRN    famotidine (PEPCID) tablet 20 mg  20 mg Oral DAILY    0.9% sodium chloride infusion 250 mL  250 mL IntraVENous PRN    diphenhydrAMINE (BENADRYL) capsule 50 mg  50 mg Oral Q4H PRN    polyethylene glycol (MIRALAX) packet 17 g  17 g Oral TID    cholecalciferol (VITAMIN D3) tablet 2,000 Units  2,000 Units Oral DAILY    docusate sodium (COLACE) capsule 100 mg  100 mg Oral DAILY AFTER BREAKFAST    oxyCODONE-acetaminophen (PERCOCET 7.5) 7.5-325 mg per tablet 1 Tab  1 Tab Oral Q4H PRN    pravastatin (PRAVACHOL) tablet 40 mg  40 mg Oral QHS    senna-docusate (PERICOLACE) 8.6-50 mg per tablet 2 Tab  2 Tab Oral PCD    folic acid (FOLVITE) tablet 1 mg  1 mg Oral DAILY    ondansetron (ZOFRAN) injection 4 mg  4 mg IntraVENous Q8H PRN    glucose chewable tablet 16 g  16 g Oral PRN    glucagon (GLUCAGEN) injection 1 mg  1 mg IntraMUSCular PRN    dextrose (D50W) injection syrg 12.5-25 g  25-50 mL IntraVENous PRN       Allergies: Vancomycin; Ampicillin; Bactrim [sulfamethoxazole-trimethoprim]; Blueberry; Ciprofloxacin; Codeine; Crestor [rosuvastatin]; Darvocet a500 [propoxyphene n-acetaminophen]; Demerol [meperidine]; Levaquin [levofloxacin]; Lipitor [atorvastatin]; Magnesium oxide; Minocin [minocycline]; Pcn [penicillins]; Pravachol [pravastatin]; Sulfa (sulfonamide antibiotics); Ultracet [tramadol-acetaminophen]; Vicodin [hydrocodone-acetaminophen]; Vytorin 10-10 [ezetimibe-simvastatin]; and Percodan [oxycodone hcl-oxycodone-asa]    Temp (24hrs), Av.8 °F (37.1 °C), Min:98.2 °F (36.8 °C), Max:99.6 °F (37.6 °C)    Visit Vitals    /55    Pulse 81    Temp 99 °F (37.2 °C)    Resp 17    Ht 5' 7\" (1.702 m)    Wt 117.4 kg (258 lb 12.8 oz)    SpO2 100%    Breastfeeding No    BMI 40.53 kg/m2       ROS: patient unable to communicate fluently. Detailed ros not feasible.     Physical Exam:    General: Well developed, well nourished female laying on the bed, AAOx3 NAD    General:   awake alert and oriented   HEENT:  Normocephalic, atraumatic, PERRL, EOMI, no scleral icterus or pallor; no conjunctival hemmohage;  nasal and oral mucous are moist and without evidence of lesions. Neck supple, no bruits. Lymph Nodes:   no cervical, axillary or inguinal adenopathy   Lungs:   non-labored, bilaterally clear to auscultation- no crackles wheezes rales or rhonchi   Heart:   s1 and s2 irregular; no rubs or gallops, no edema, + pedal pulses   Abdomen:  soft, non-distended, active bowel sounds, no hepatomegaly, no splenomegaly. no tenderness over the left abdominal pacemaker, no overlying erythema or fluctuance, drain right lower abdomen with bloody drainage, mild linnette umbilical tenderness   Genitourinary:  deferred   Extremities:   no clubbing, cyanosis; no joint effusions or swelling; muscle mass appropriate for age   Neurologic:  No gross focal sensory abnormalities; 5/5 muscle strength to upper extremities. 0/5 strength in lower extremities.   Cranial nerves intact                        Skin:  Surgical scars abdomen, left neck well healed   Back:   no paraspinal muscle guarding or rigidity, no right CVA tenderness     Psychiatric:  No suicidal or homicidal ideations, appropriate mood and affect         Labs: Results:   Chemistry Recent Labs      05/17/17   0142  05/16/17   0315  05/15/17   0604  05/14/17   1630   GLU  131*  63*  109*  132*   NA  137  137  136  135*   K  3.6  3.7  4.0  4.0   CL  103  104  102  100   CO2  25  26  25  26   BUN  22*  28*  30*  31*   CREA  2.50*  2.88*  2.98*  3.26*   CA  8.7  8.8  8.1*  8.2*   AGAP  9  7  9  9   BUCR  9*  10*  10*  10*   AP  250*  296*   --   298*   TP  6.1*  6.4   --   5.9*   ALB  2.9*  2.3*   --   1.3*   GLOB  3.2  4.1*   --   4.6*   AGRAT  0.9  0.6*   --   0.3*      CBC w/Diff Recent Labs      05/17/17   0142  05/16/17   0315  05/15/17   0604   WBC  9.4  10.5  10.6   RBC  2.49*  2.70*  2.67*   HGB  6.6*  7.1*  7.1*   HCT 20.5*  22.0*  21.5*   PLT  217  254  233   GRANS  81*  72  73   LYMPH  11*  9*  14*   EOS  1  1  3      Microbiology Recent Labs      05/15/17   1026  05/15/17   0900   CULT  NO GROWTH 2 DAYS  NO GROWTH 2 DAYS          RADIOLOGY:    All available imaging studies/reports in [facility] for this admission were reviewed    Dr. Elham Loera, Infectious Disease Specialist  [phone]  May 17, 2017  3:49 PM

## 2017-05-17 NOTE — PROGRESS NOTES
Problem: Mobility Impaired (Adult and Pediatric)  Goal: *Acute Goals and Plan of Care (Insert Text)  STGs to be addressed within 3 days:  1. Bed mobility: Supine to sit to supine MaxAx1 with HR for meals. 2. Activity tolerance: Tolerate EOB > 15 minutes for ADLs. 3. Transfers: SPT-->to chair max/mod A with LRAD for ADLs. 4. Pt demo fair seated balance in preparation for functional transfers. LTGs to be addressed within 7 days:  1. Transfers: SB-->to chair/wc max/mod A for ADLs. 2. Activity tolerance: Tolerated > 1 hr in chair for change of position. 3. Patient Education: Independent with HEP for home safety. Outcome: Not Progressing Towards Goal  PHYSICAL THERAPY RE-EVALUATION AND TREATMENT     Patient: Sandra Michael (27 y.o. female)  Date: 5/17/2017  Diagnosis: Acute paraplegia  Acute paraplegia (HCC) Acute paraplegia ([facility] [address]  Precautions: Fall, Skin      ASSESSMENT:  Patient's progression toward goals since last assessment: Patient has since transferred to ICU for further monitor due to decreased medical status on step down floor, chest pain, increased confusion. Patient (+) for B LE DVT, IVC filter placed. Patient remains drowsy, required increased verbal/tactile cues for maintaining alertness. Patient has made very limited progress since last re-evaluation due to ongoing medical complications. Patient remains total assistance x 2 persons for rolling and scooting higher in bed. Patient with noted jerking of B UE with active assisted ROM, especially with shoulder/elbow flex/ext. Attempted PROM of B LE, however, patient unable to tolerate movement of B LE. Patient would continue to benefit from PT to address above impairments, however, limited progress noted. Will trial patient x 4-7 days. Patient would continue to benefit from rehab with specialty of spinal cord rehabilitation. PLAN:  Goals have been updated based on progression since last assessment.   Patient continues to benefit from skilled intervention to address the above impairments. Continue to follow the patient daily x 4-7 x week to address goals. Planned Interventions:  [X]     Bed Mobility Training          [X]     Neuromuscular Re-Education  [X]     Transfer Training                [ ]    Orthotic/Prosthetic Training  [ ]     Gait Training                       [ ]     Modalities  [X]     Therapeutic Exercises       [ ]     Edema Management/Control  [X]     Therapeutic Activities         [X]     Patient and Family Training/Education  [ ]     Other (comment):  Discharge Recommendations: Inpatient [facility]  Further Equipment Recommendations for Discharge: To be determined       SUBJECTIVE:   Patient stated Am I in my room?       OBJECTIVE DATA SUMMARY:   Critical Behavior:  Neurologic State: Drowsy, Eyes open to voice, Eyes open to stimulus  Orientation Level: Oriented to person, Oriented to place, Disoriented to situation, Disoriented to time  Cognition: Decreased attention/concentration, Decreased command following  Safety/Judgement: Decreased insight into deficits, Decreased awareness of need for assistance  Functional Mobility Training:  Bed Mobility:  Rolling: Total assistance;Assist x2; Additional time  Scooting: Total assistance;Assist x2; Additional time  Interventions: Verbal cues; Tactile cues; Visual cues  Transfers:  Sit to Stand:  (N/A)  Balance:  Sitting:  (N/A)  Standing:  (N/A)  Ambulation/Gait Training:  Ambulation - Level of Assistance:  (N/A)  Pain:  Pain Scale 1: Numeric (0 - 10)  Pain Intensity 1: 8  Pain Location 1: Groin  Pain Orientation 1: Left;Right  Pain Description 1: Sharp  Pain Intervention(s) 1: Rest  Activity Tolerance:   Poor  Please refer to the flowsheet for vital signs taken during this treatment.   After treatment:   [ ]  Patient left in no apparent distress sitting up in chair  [X]  Patient left in no apparent distress in bed  [X]  Call bell left within reach  [X]  Nursing notified  [ ]  Caregiver present  [ ]  Bed alarm activated     Anahi Mcknight, PT   Time Calculation: 18 mins      G-codes:  Mobility  Current  CN= 100%   Goal  CM= 80-99%. The severity rating is based on the Level of Assistance required for Functional Mobility and ADLs.

## 2017-05-17 NOTE — PROGRESS NOTES
Progress Note    Patient: Britt Severs MRN: [id number]  SSN: [id number]    YOB: 1950  Age: 77 y.o.   Sex: female      Admit Date: 4/20/2017    LOS: 27 days     Subjective:     DENIES FLANK,  ABD OR BACK PAIN  MORE ALERT  TEMP DOWN    Objective:     Vitals:    05/17/17 0300 05/17/17 0400 05/17/17 0500 05/17/17 0600   BP: 138/48 129/51 146/54 135/48   Pulse: 77 81 76 77   Resp: 13 16 14 14   Temp:  99 °F (37.2 °C)     TempSrc:       SpO2: 98% 93% 98% 98%   Weight: 258 lb 12.8 oz (117.4 kg)      Height:            Intake and Output:  Current Shift:    Last three shifts: 05/15 1901 - 05/17 0700  In: 2653.8 [I.V.:2633.8]  Out: 2480 [Urine:2460; Drains:20]    Current Facility-Administered Medications   Medication Dose Route Frequency    0.9% sodium chloride infusion 250 mL  250 mL IntraVENous PRN    midodrine (PROAMITINE) tablet 10 mg  10 mg Oral Q8H    fluconazole (DIFLUCAN) 200mg/100 mL IVPB (premix)  200 mg IntraVENous Q24H    meropenem (MERREM) 500 mg in 0.9% sodium chloride (MBP/ADV) 50 mL MBP  0.5 g IntraVENous Q12H    insulin lispro (HUMALOG) injection   SubCUTAneous Q6H    acetaminophen (TYLENOL) suppository 650 mg  650 mg Rectal Q6H PRN    acetaminophen (TYLENOL) tablet 500 mg  500 mg Oral Q6H PRN    linezolid (ZYVOX) IVPB premix in D5W 600 mg  600 mg IntraVENous Q12H    diclofenac (VOLTAREN) 1 % topical gel 2 g  2 g Topical Q8H PRN    allopurinol (ZYLOPRIM) tablet 50 mg  50 mg Oral DAILY    aluminum-magnesium hydroxide (MAALOX) oral suspension 15 mL  15 mL Oral QID PRN    albuterol-ipratropium (DUO-NEB) 2.5 MG-0.5 MG/3 ML  3 mL Nebulization Q4HWA RT    Lactobacillus Acidoph & Bulgar (FLORANEX) tablet 1 Tab  1 Tab Oral BID    sodium chloride (NS) flush 5-10 mL  5-10 mL IntraVENous Q8H    naloxone (NARCAN) injection 0.1 mg  0.1 mg IntraVENous Multiple    acetaminophen (TYLENOL) tablet 650 mg  650 mg Oral Q4H PRN    famotidine (PEPCID) tablet 20 mg  20 mg Oral DAILY    0.9% sodium chloride infusion 250 mL  250 mL IntraVENous PRN    diphenhydrAMINE (BENADRYL) capsule 50 mg  50 mg Oral Q4H PRN    polyethylene glycol (MIRALAX) packet 17 g  17 g Oral TID    cholecalciferol (VITAMIN D3) tablet 2,000 Units  2,000 Units Oral DAILY    docusate sodium (COLACE) capsule 100 mg  100 mg Oral DAILY AFTER BREAKFAST    oxyCODONE-acetaminophen (PERCOCET 7.5) 7.5-325 mg per tablet 1 Tab  1 Tab Oral Q4H PRN    pravastatin (PRAVACHOL) tablet 40 mg  40 mg Oral QHS    senna-docusate (PERICOLACE) 8.6-50 mg per tablet 2 Tab  2 Tab Oral PCD    folic acid (FOLVITE) tablet 1 mg  1 mg Oral DAILY    ondansetron (ZOFRAN) injection 4 mg  4 mg IntraVENous Q8H PRN    glucose chewable tablet 16 g  16 g Oral PRN    glucagon (GLUCAGEN) injection 1 mg  1 mg IntraMUSCular PRN    dextrose (D50W) injection syrg 12.5-25 g  25-50 mL IntraVENous PRN         Physical Exam:   GENERAL: alert, cooperative, no distress, appears stated age  ABDOMEN: soft, non-tender.  Bowel sounds normal. No masses,  no organomegaly  FLANK:  NON TENDER    Lab/Data Review:  BMP:   Lab Results   Component Value Date/Time     05/17/2017 01:42 AM    K 3.6 05/17/2017 01:42 AM     05/17/2017 01:42 AM    CO2 25 05/17/2017 01:42 AM    AGAP 9 05/17/2017 01:42 AM     (H) 05/17/2017 01:42 AM    BUN 22 (H) 05/17/2017 01:42 AM    CREA 2.50 (H) 05/17/2017 01:42 AM    GFRAA 23 (L) 05/17/2017 01:42 AM    GFRNA 19 (L) 05/17/2017 01:42 AM     CMP:   Lab Results   Component Value Date/Time     05/17/2017 01:42 AM    K 3.6 05/17/2017 01:42 AM     05/17/2017 01:42 AM    CO2 25 05/17/2017 01:42 AM    AGAP 9 05/17/2017 01:42 AM     (H) 05/17/2017 01:42 AM    BUN 22 (H) 05/17/2017 01:42 AM    CREA 2.50 (H) 05/17/2017 01:42 AM    GFRAA 23 (L) 05/17/2017 01:42 AM    GFRNA 19 (L) 05/17/2017 01:42 AM    CA 8.7 05/17/2017 01:42 AM    MG 2.0 05/17/2017 01:42 AM    ALB 2.9 (L) 05/17/2017 01:42 AM    TP 6.1 (L) 05/17/2017 01:42 AM GLOB 3.2 05/17/2017 01:42 AM    AGRAT 0.9 05/17/2017 01:42 AM    SGOT 30 05/17/2017 01:42 AM    ALT 29 05/17/2017 01:42 AM     CBC:   Lab Results   Component Value Date/Time    WBC 9.4 05/17/2017 01:42 AM    HGB 6.6 (L) 05/17/2017 01:42 AM    HCT 20.5 (L) 05/17/2017 01:42 AM     05/17/2017 01:42 AM          CT Results:    REPEAT  CT  5/16  IMPRESSION  IMPRESSION:  1. Smaller right psoas muscle implying smaller intramuscular fluid collection  and less edema/inflammation. Draining catheter is still in place. Limited  evaluation without IV contrast. Slightly smaller presacral fluid with no  discrete collection. 2. Less severe left lower lobe pulmonary consolidation but more severe right  lower lobe consolidation. No pleural effusion. 3. Worsening anasarca/subcutaneous edema on both sides. 4. More fluid in the ascending and transverse colon, colitis or diarrheal  disease? Results from [place] encounter on 04/20/17   CT ABD PELV WO CONT   Narrative CT abdomen and pelvis without IV or oral contrast    INDICATION: Evaluate fluid collection and new left upper quadrant pain. Comparison May 4, 2017    All CT scans at this facility are performed using dose optimization technique as  appropriate to a performed exam, to include automated exposure control,  adjustment of the mA and/or kV according to patient size (including appropriate  matching for site specific examination) or use of iterative reconstruction  technique. More consolidation/pneumonia in the right lower lobe. Less severe left lower  lobe pneumonia when compared to prior study. No significant effusion. Cardiomegaly with no pericardial effusion. The right psoas muscle is slightly smaller. Draining catheter is still in  place. No large fluid collection surrounding the catheter. Small residual  collection not completely excluded without IV contrast.    Also slightly smaller presacral fluid.  Small abscess or loculation not  completely excluded but no focal collection identified. No new abnormalities in the left upper quadrant. No left upper quadrant  inflammation or free air. Splenomegaly is unchanged. Pancreas, liver, adrenal  glands and kidneys are stable with large left renal cyst. Aorta is normal in  caliber. IVC filter in place. More subcutaneous edema/anasarca along both sides. No discrete fluid collection. No bowel obstruction. Fluid in the colon, mild colitis, diarrheal disease  possible. However, moderate fecal material retention in the rectosigmoid colon  and descending colon noted. Impression IMPRESSION:  1. Smaller right psoas muscle implying smaller intramuscular fluid collection  and less edema/inflammation. Draining catheter is still in place. Limited  evaluation without IV contrast. Slightly smaller presacral fluid with no  discrete collection. 2. Less severe left lower lobe pulmonary consolidation but more severe right  lower lobe consolidation. No pleural effusion. 3. Worsening anasarca/subcutaneous edema on both sides. 4. More fluid in the ascending and transverse colon, colitis or diarrheal  disease? US Results:    Results from [place] encounter on 04/20/17   US CHEST   Narrative Ultrasound of the chest    HISTORY: Pleural effusion. COMPARISON: Chest x-ray May 16, 2017. FINDINGS: Images of the right-sided chest was performed. There is no significant  pleural fluid present for safe thoracentesis. Thoracentesis was not performed. Impression IMPRESSION: Insufficient volume of right pleural effusion for thoracentesis.           Assessment:     Principal Problem:    Acute paraplegia ([address] (4/20/2017)    Active Problems:    Type 2 diabetes mellitus with diabetic neuropathy ([address] (6/28/2011)      AICD generator infection ([address] (8/20/2012)      Overview: S/p explant 4 leads 8/20/12      Benign hypertensive heart disease with systolic CHF, NYHA class 2 ([address] (9/5/2012) Decreased calculated glomerular filtration rate (GFR) (3/30/2017)      Overview: Calculated GFR equivalent to that of CKD stage 3 = 30-59 ml/min      Iliopsoas muscle hematoma (3/30/2017)      Psoas hematoma, right, secondary to anticoagulant therapy (3/30/2017)      Impaired mobility and ADLs (3/30/2017)      History of Coumadin therapy ()      Overview: Anticoagulation for chronic atrial fibrillation; Discontinued on 3/30/2017      Sepsis ([address] (4/20/2017)      Psoas abscess, right ([address] (4/20/2017)      Krueger catheter in place on admission (4/20/2017)      Urinary tract infection due to Enterococcus (4/20/2017)      Group B streptococcal infection (4/20/2017)        Plan:     DESPITE MINIMAL  DRAINAGE,  APPEARS  TO BE RESPONDING  [place] DRAIN FOR NOW    Signed By: Mai Antonio MD , FACS    May 17, 2017      PAGER:  [phone]  CELL:  19 Analy Anne:  [address] [facility]   [phone]

## 2017-05-17 NOTE — PROGRESS NOTES
ICU Progress note      Name: Britt Severs   : 1950   MRN: [id number]   Date: 2017     [x]I have reviewed the flowsheet and previous days notes. Events overnight reviewed and discussed with nursing staff. Vital signs and records reviewed. HPI:  Patient is a 77 y.o. female  w/hx of HTN, CHF, heart block s/p pacer, multiple vascular procedures in neck veins with residual stenoses, prior Hx of DVT, DM, Afib, recent admission for Rt psoas hematoma. Was readmitted on 17 for acute paraplegia, fevers, sweats. Repeat CT demonstrated increased size of psoas fluid collection. Labs notable for leukocytosis and LISBETH. Pt brought to IR and 300ml of pus aspirated. Hypotensive upon return to floor, and still so after ~1500 ml IVF. Pt was transferred to ICU on 17 for hypotension s/p procedure; recovered with pressor support and fluids. Pt was then transferred out of ICU floor. Last seen by Pulmonary /ICU - 17        Pulmonary was re-consulted on 17 for pt d/t SOB x2-3 days without clear cause. Pt states that her chest and abd \"hurt\", which is causing her to have SOB. Pt states the pain and SOB is worse while lying flat and taking deep breaths, improves with sitting up. Per pt and RN, pt has not eaten much the last couple of days - pain and SOB thought to possibly relate to GERD/acid reflux. Maalox was ordered for pt today to help with abd pain. In addition, workup of this SOB has included cardiac enzymes (3 sets, all negative); VQ scan - low probability for PE; bilateral venous dopplers, which shows (+) DVT in R Common Femoral Vein and (+) DVT in L popliteal vein. Pt then underwent emergent IVC placement d/t inability to anticoagulate using heparin 2/2 hx Psoas muscle hematoma. 17: alert. Still c/o LUQ pain. CT fairly unremarkable; off pressors                  ROS:A comprehensive review of systems was negative except for that written in the HPI.       Vital Signs:    Visit Vitals  /54    Pulse 87    Temp 99 °F (37.2 °C)    Resp 17    Ht 5' 7\" (1.702 m)    Wt 117.4 kg (258 lb 12.8 oz)    SpO2 98%    Breastfeeding No    BMI 40.53 kg/m2       O2 Device: Nasal cannula   O2 Flow Rate (L/min): 2 l/min   Temp (24hrs), Av.7 °F (37.1 °C), Min:98.2 °F (36.8 °C), Max:99.6 °F (37.6 °C)       Intake/Output:   Last shift:         Last 3 shifts: 05/15 1901 -  0700  In: 2653.8 [I.V.:2633.8]  Out: 3173 [Urine:2460; Drains:20]    Intake/Output Summary (Last 24 hours) at 17 0759  Last data filed at 17 0620   Gross per 24 hour   Intake             2225 ml   Output             1470 ml   Net              755 ml           Physical Exam:    General: Appears fatigued, NAD. Pts daughter at bedside. HEENT:  Anicteric sclerae; pink palpebral conjunctivae; mucosa moist  Resp: Mod AE, no wheeze, no rhonchi  CV:  S1, S2 present; regular rate and rhythm  GI:  Abdomen soft, non-tender; (+) active bowel sounds  Extremities:  (+)1BLE , no cyanosis noted  Skin:  Warm; no rashes/ lesions noted  Neurologic: alert and oriented X 3        DATA:     Results for iVcki Webb (MRN [id number]) as of 2017 13:29   Ref.  Range 2017 04:11 2017 19:52 2017 13:42 2017 04:04 5/10/2017 03:27 5/10/2017 23:35 2017 08:20 2017 05:45   WBC Latest Ref Range: 4.6 - 13.2 K/uL 5.7  7.4 6.9  11.8 13.3 (H) 12.8       Labs: Results:       Chemistry Recent Labs      17   0142  17   0315  05/15/17   0604  17   1630   GLU  131*  63*  109*  132*   NA  137  137  136  135*   K  3.6  3.7  4.0  4.0   CL  103  104  102  100   CO2  25  26  25  26   BUN  22*  28*  30*  31*   CREA  2.50*  2.88*  2.98*  3.26*   CA  8.7  8.8  8.1*  8.2*   AGAP  9  7  9  9   BUCR  9*  10*  10*  10*   AP  250*  296*   --   298*   TP  6.1*  6.4   --   5.9*   ALB  2.9*  2.3*   --   1.3*   GLOB  3.2  4.1*   --   4.6*   AGRAT  0.9  0.6*   --   0.3*      CBC w/Diff Recent Labs      17   0142 05/16/17   0315  05/15/17   0604   WBC  9.4  10.5  10.6   RBC  2.49*  2.70*  2.67*   HGB  6.6*  7.1*  7.1*   HCT  20.5*  22.0*  21.5*   PLT  217  254  233   GRANS  81*  72  73   LYMPH  11*  9*  14*   EOS  1  1  3      Coagulation Recent Labs      05/15/17   1446   PTP  15.1   INR  1.2   APTT  25.5       Liver Enzymes Recent Labs      05/17/17   0142   TP  6.1*   ALB  2.9*   AP  250*   SGOT  30      ABG No results found for: PH, PHI, PCO2, PCO2I, PO2, PO2I, HCO3, HCO3I, FIO2, FIO2I   Microbiology Recent Labs      05/15/17   1026  05/15/17   0900   CULT  NO GROWTH AFTER 21 HOURS  NO GROWTH AFTER 21 HOURS          Imaging:  [x]I have personally reviewed the patients radiographs  [x]Radiographs reviewed with radiologist    Portable chest x-ray 5/13/17     CPT code 69120      INDICATION: Wheezing     COMPARISONS: Chest x-rays 5/10/2017 and 5/12/2017     FINDINGS: Frontal view of the chest obtained at 2313 hours. The cardiac  silhouette is mildly enlarged but stable. Epicardial leads are redemonstrated. Right PICC line has been advanced into the SVC. The pulmonary vascular markings  are normal. There is a stable right pleural effusion and basilar atelectasis. The left lung is clear. Eventration of the right diaphragm is stable. There is  no pneumothorax. The osseous structures are stable.     IMPRESSION  IMPRESSION:     1. No change in right pleural effusion and atelectasis.     2. PICC line has been advanced into the SVC. Stable mild cardiomegaly. CXR 5/13/17      FINDINGS: There is a right-sided PICC line with its tip overlying the right  subclavian vein. The heart is normal in size. Status post median sternotomy. Diffuse right lung infiltrates are again seen not significantly changed.   Probable right pleural effusion.     IMPRESSION  Impression:     Diffuse right lung infiltrates not significantly changed in the one-day  interval. Probable right pleural effusion.     PICC line with its tip in the right subclavian vein. IMPRESSION:   (+) DVT -  R Common Femoral Vein and L popliteal vein; s/p IVC filter. V/Q scan on 5/11/17: low probability. With recent bleed, not a candidate for systemic anticoagulation. · Hypoxia- essentially resolved  · Sepsis - initially 2/2 infected psoas hematoma/epidural abscess (GAS) with neurological compromise- now with GPC in blood and right-sided infiltrate. ? PICC infection, ? UTI, ? HCAP, pacer infection, fungemia. Also has new LUQ pain (?splenic abscess). 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 02 at 2Lpm with      02 sat 99%. Keep HOB >90'- Asp precautions. Pulmonary toilet. Encourage IS. Continue with EZPAP- duo nebs. · CV: MAP ok on midodrine. Will continue. · GI: on ADA thickened diet. Poor intake yesterday; on pepcid. CT with ?e/o colitis. Pt has no clinical evidence of colitis (diarrhea), only mild abdominal discomfort. ?constipation. Will give enema x 1. Defer to ID regarding empiric Rx  · Renal: LISBETH since admission, ?worse after gent. Slow improvement. No acute need for HD. Replace perez today and Cx urine. · ID: empiric ABX (linezolid and edward). Repeat blood Cx pending; 5/14 CoNS. ? HCAP, PICC infection, Urosepsis, pacer infection, colitis. Pt improving with empiric Rx, not including colitis Rx.  Defer to ID regarding empiric Rx in light of CT, and discussion with cardiology regarding pacer. Plan to culture urine today. · Heme: known DVTs, but recent psoas bleed. Has been over a month however; drain still in place. Will start prophy heparin over next few days (if Hb remains stable following blood), follow Hb. Transfuse at Hb <7 (2 units today). IVC filter in place  · Endo: lantus stopped given poor po intake. Glucose control adequate  · Neuro: likely encephalopathy 2/2 sepsis; resolved. Minimize sedatives    Can transfer back to medical    Mariposa Johnson MD  PCCM  35 min             My assessment/plan was discussed with:  [x]nursing []PT/OT        []family []             Hortencia Parham MD         Clinical care and time spend evaluating the patient : 35 min      . Carolina Parham MD  Pulmonary/ Critical Care Medicine

## 2017-05-17 NOTE — INTERDISCIPLINARY ROUNDS
CRITICAL CARE INTERDISCIPLINARY ROUNDS      Patient Information:    Name:   Fernando Arnold    Age:   77 y.o. Admission Date:   4/20/2017    Readmit Risk Assessment Information:      Readmit Risk Tool Support Systems: Child(lea), Family member(s), Amish / braulio community, Friends \ neighbors, Spouse/Significant Other/Partner, Relationship with Primary Physician Group: Seen at least one time within the past 6 months    Surgery Date:      Day of Stay:     Expected Discharge Date:        Attending Provider:   Jose Dickinson MD    Surgeon:        Consultant:       Primary Care Provider:   Sharmaine Ernst DO    Problem List:     Patient Active Problem List   Diagnosis Code    Nonischemic cardiomyopathy ([facility] I42.8    History of complete heart block Z86.79    Biventricular implantable cardioverter-defibrillator in situ Z95.810    Left bundle branch block (LBBB) on electrocardiogram I44.7    Type 2 diabetes mellitus with diabetic neuropathy ([facility] E11.40    Dyslipidemia E78.5    Diabetic neuropathy associated with type 2 diabetes mellitus ([facility] E11.40    Obstructive sleep apnea on CPAP G47.33, Z99.89    AICD generator infection ([facility] T82. 7XXA    Difficult airway for intubation T88. 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 1XXA    Impaired mobility and ADLs Z74.09    History of Coumadin therapy Z79.01    Gout M10.9    Acute paraplegia (HCC) G82.20    Sepsis ([facility] A41.9    Psoas abscess, right ([facility] K68.12    Krueger catheter in place on admission Z96.0    Urinary tract infection due to Enterococcus N39.0, B95.2    Group B streptococcal infection A49.1       Principal Problem:  Acute paraplegia ([facility]    Procedure:       During rounds the following quality care indicators and evidence based practices were addressed :     DVT Prophylaxis, Pressure Injury Prevention, Pain Management, Sepsis resuscitation and management, Nutritional Status, Critical Care Interventions Airways, Drains and Lines and IHI Bundles: Central Line Bundle Followed  and Krueger Bundle Followed           Acute MI/PCI:   Not applicable    Heart Failure:    Not applicable    Cardiac Surgery:  Not applicable    SCIP Measures for other Surgeries:   Not applicable    Pneumonia:    Appropriate Antibiotic Selection (ICU versus Non-ICU)    Stroke:  Patient's Personal Risk Factors for Stroke are:   hypertension, family history, hyperlipidemia or diabetes mellitus    NIH Stroke Score       Transfer Level of Care:  Ready for Transfer    The patient will require the following interventions based on the Readmission Risk Assessment:  Pharmacy evaluation and teaching, Care Management involvement for home health follow up for:  mobility and assistance with ADL's and Spiritual Care evaluation      Discharge Management:  Group Home and Palliative Care    Anticipated Discharge Date:  3days      Interdisciplinary team rounds were held  with the following team membersCare Management, Diabetes Treatment Specialist, Nursing, Nutrition, Pastoral Care, Pharmacy, Physician and Clinical Coordinator and the  patient and healthcare POA (documentation required). Plan of care discussed. See clinical pathway and/or care plan for interventions and desired outcomes. Transfer to medical floor.

## 2017-05-17 NOTE — ROUTINE PROCESS
Bedside and Verbal shift change report given to Aman French RN (oncoming nurse) by Raul Spence RN (offgoing nurse). Report included the following information SBAR, Kardex, ED Summary, Intake/Output, MAR, Recent Results and Cardiac Rhythm Paced.     hgb 6.6 this morning. 2 units PRBCs ordered.

## 2017-05-17 NOTE — ROUTINE PROCESS
Bedside and Verbal shift change report given to Sebas Arce RN (oncoming nurse) by Kassidy Romo RN (offgoing nurse). Report included the following information SBAR, Kardex, MAR and Recent Results. SITUATION:    Code Status: Full Code   Reason for Admission: Acute paraplegia   Acute paraplegia [facility] day: 32   Problem List:       Hospital Problems  Date Reviewed: 4/20/2017          Codes Class Noted POA    * (Principal)Acute paraplegia ([facility] [address] ICD-10-CM: G82.20  ICD-9-CM: 344.1  4/20/2017 Yes        Sepsis ([facility] [address] ICD-10-CM: A41.9  ICD-9-CM: 038.9, 995.91  4/20/2017 Yes        Psoas abscess, right ([facility] [address] ICD-10-CM: Z37.48  ICD-9-CM: 567.31  4/20/2017 Yes        Krueger catheter in place on admission ICD-10-CM: Z96.0  ICD-9-CM: V45.89  4/20/2017 Yes        Urinary tract infection due to Enterococcus ICD-10-CM: N39.0, B95.2  ICD-9-CM: 599.0, 041.04  4/20/2017 Yes        Group B streptococcal infection ICD-10-CM: A49.1  ICD-9-CM: 041.02  4/20/2017 Yes        History of Coumadin therapy ICD-10-CM: Z79.01  ICD-9-CM: V58.61  Unknown Yes    Overview Signed 4/6/2017 10:35 PM by Ouida Barthel, MD     Anticoagulation for chronic atrial fibrillation; Discontinued on 3/30/2017             Decreased calculated glomerular filtration rate (GFR) ICD-10-CM: R94.4  ICD-9-CM: 794.4  3/30/2017 Yes    Overview Signed 4/6/2017  5:47 PM by Ouida Barthel, MD     Calculated GFR equivalent to that of CKD stage 3 = 30-59 ml/min             Iliopsoas muscle hematoma ICD-10-CM: S70.10XA  ICD-9-CM: 924.00  3/30/2017 Yes        Psoas hematoma, right, secondary to anticoagulant therapy ICD-10-CM: S30. 1XXA  ICD-9-CM: 924.9, E934.2  3/30/2017 Yes        Impaired mobility and ADLs ICD-10-CM: Z74.09  ICD-9-CM: 799.89  3/30/2017 Yes        Benign hypertensive heart disease with systolic CHF, NYHA class 2 (HCC) (Chronic) ICD-10-CM: I11.0, I50.20  ICD-9-CM: 402.11, 428.20, 428.0  9/5/2012 Yes        AICD generator infection ([facility] [address] ICD-10-CM: T82. 7XXA  ICD-9-CM: 996.61  8/20/2012 Yes    Overview Signed 8/20/2012  6:13 PM by Daria Jain MD     S/p explant 4 leads 8/20/12             Type 2 diabetes mellitus with diabetic neuropathy (HCC) (Chronic) ICD-10-CM: E11.40  ICD-9-CM: 250.60, 357.2  6/28/2011 Yes              BACKGROUND:    Past Medical History:   Past Medical History:   Diagnosis Date    Acute paraplegia ([facility] [address] 4/20/2017    Benign hypertensive heart disease with systolic CHF, NYHA class 2 ([address] 9/5/2012    Biventricular implantable cardioverter-defibrillator in situ 04/28/2005    Upgraded to BiV AICD; gen change 4/2008; pocket revision 10/2009; Abdominal - done on 8/22/2012 by Dr. Rajesh Quinn Cardiac cath 08/15/1996    Patent coronaries. Elev LVEDP. EF 50-55%.  Cardiac echocardiogram 06/23/2015    Ltd study. EF 45-50%. Mild, diffuse hypk. Severe apical hypk. No mass or thrombus was clearly identified, although imaging was suboptimal.      Cardiac nuclear imaging test 06/19/2015    Fixed distal apical, distal septal defect more likely due to RV pacing than prior infarct. No ischemia. EF 46%. RWMA c/w RV pacing. Nondiagnostic EKG on pharm stress test.      Cardiovascular lower extremity venous duplex 09/04/2012    Acute, non-occlusive DVT in CFV on right. No DVT on left. No superficial thrombosis bilaterally.  Cardiovascular upper extremity venous duplex 08/27/2012    DVT in axillary vein on left. Left subclavian was not visualized.     Chronic anemia 9/5/2012    Chronic systolic heart failure (HCC)     Decreased calculated glomerular filtration rate (GFR) 3/30/2017    Calculated GFR equivalent to that of CKD stage 3 = 30-59 ml/min    Diabetic neuropathy associated with type 2 diabetes mellitus ([facility] [address] 6/28/2011    Difficult airway for intubation 08/22/2012    see anesthesia airway note    Dyslipidemia 6/28/2011    Gout     History of complete heart block 6/28/2011    History of Coumadin therapy Anticoagulation for DVT of the LUE; Discontinued on 3/30/2017    History of deep venous thrombosis 9/5/2012    Left upper extremity    History of pyelonephritis 3/30/2017    Left bundle branch block (LBBB) on electrocardiogram 6/28/2011    Nonischemic cardiomyopathy ([facility] [address] 6/28/2011    Obesity (BMI 35.0-39.9 without comorbidity) ([facility] [address] 3/13/2017    Obstructive sleep apnea on CPAP 2/7/2012    Psoas abscess, right ([facility] [address] 4/20/2017    Psoas hematoma, right, secondary to anticoagulant therapy 3/30/2017    Type 2 diabetes mellitus with diabetic neuropathy ([facility] [address] 6/28/2011         Patient taking anticoagulants: No     ASSESSMENT:    Changes in Assessment Throughout Shift: SLP evaluation done. Diabetic pureed diet with honey thick liquids and magic cups ordered. CT scan of abdomen and pelvis without contrast done. Lethargic / drowsy throughout shift. Poor PO intake. Midodrine started per order. -130 mmHg range throughout shift.      Patient has Central Line: Yes - Reasons if yes: Poor venous access     Patient has Krueger Cath: No      Last Vitals:     Vitals:    05/16/17 1730 05/16/17 1800 05/16/17 1900 05/16/17 2052   BP: 137/45 137/48 145/52    Pulse: 87 85 82    Resp: 10 11 11    Temp:   98.2 °F (36.8 °C)    TempSrc:       SpO2: 99% 99% 100% 100%   Weight:       Height:            IV and DRAINS (will only show if present)   Drain 8.5 FR Right psoas muscle drain 05/10/17 Right Other (comment)-Site Assessment: Clean, dry, & intact  [REMOVED] Drain 04/20/17 Right Other (comment)-Site Assessment: Clean, dry, & intact  [REMOVED] Peripheral IV 04/20/17 Left Arm-Site Assessment: Clean, dry, & intact  [REMOVED] Peripheral IV 04/20/17 Right Antecubital-Site Assessment: Clean, dry, & intact  [REMOVED] Triple Lumen Triple Lumen CVL 04/20/17 Left Other(comment)-Site Assessment: Clean, dry, & intact  [REMOVED] Peripheral IV 04/21/17 Left Arm-Site Assessment: Clean, dry, & intact  [REMOVED] Peripheral IV 04/25/17 Left Forearm-Site Assessment: Clean, dry, & intact  PICC Double Lumen 38/15/91 Right;Basilic-Site Assessment: Clean, dry, & intact     WOUND (if present)   Wound Type:  Perineal excoriation   Dressing present Dressing Present : Yes, Intact, not due to be changed   Wound Concerns/Notes:  none     PAIN    Pain Assessment    Pain Intensity 1: 0 (05/16/17 1600)    Pain Location 1: Generalized    Pain Intervention(s) 1: Medication (see MAR) (PRN Percocet)    Patient Stated Pain Goal: 0  o Interventions for Pain:  none  o Intervention effective: N/A  o Time of last intervention: N/A   o Reassessment Completed: yes      Last 3 Weights:  Last 3 Recorded Weights in this Encounter    05/13/17 0613 05/14/17 0542 05/16/17 0100   Weight: 117.8 kg (259 lb 11.2 oz) 119.6 kg (263 lb 10.7 oz) 113.3 kg (249 lb 12.5 oz)     Weight change:      INTAKE/OUPUT    Current Shift:      Last three shifts: 05/15 0701 - 05/16 1900  In: 3135.5 [I.V.:3115.5]  Out: 3187 [Urine:2435; Drains:17]     LAB RESULTS     Recent Labs      05/16/17   0315  05/15/17   0604  05/14/17   1630   WBC  10.5  10.6  11.5   HGB  7.1*  7.1*  7.6*   HCT  22.0*  21.5*  23.2*   PLT  254  233  237        Recent Labs      05/16/17   0315  05/15/17   1446  05/15/17   0604  05/14/17   1630   NA  137   --   136  135*   K  3.7   --   4.0  4.0   GLU  63*   --   109*  132*   BUN  28*   --   30*  31*   CREA  2.88*   --   2.98*  3.26*   CA  8.8   --   8.1*  8.2*   MG  2.0   --   1.8   --    INR   --   1.2   --    --        RECOMMENDATIONS AND DISCHARGE PLANNING     1. Pending tests/procedures/ Plan of Care or Other Needs: Straight cath every 6 hr; Encourage PO intake     2. Discharge plan for patient and Needs/Barriers: TBD    3. Estimated Discharge Date: TBD; Posted on Whiteboard in Patients Room: yes      4. The patient's care plan was reviewed with the oncoming nurse.        \"HEALS\" SAFETY CHECK      Fall Risk    Total Score: 4    Safety Measures: Safety Measures: Bed/Chair-Wheels locked, Bed in low position, Call light within reach, Emergency bedside equipment, Fall prevention (comment), Nurse at bedside, Side rails X 3    A safety check occurred in the patient's room between off going nurse and oncoming nurse listed above. The safety check included the below items  Area Items   H  High Alert Medications - Verify all high alert medication drips (heparin, PCA, etc.)   E  Equipment - Suction is set up for ALL patients (with vania)  - Red plugs utilized for all equipment (IV pumps, etc.)  - WOWs wiped down at end of shift.  - Room stocked with oxygen, suction, and other unit-specific supplies   A  Alarms - Bed alarm is set for fall risk patients  - Ensure chair alarm is in place and activated if patient is up in a chair   L  Lines - Check IV for any infiltration  - Krueger bag is empty if patient has a Krueger   - Tubing and IV bags are labeled   S  Safety   - Room is clean, patient is clean, and equipment is clean. - Hallways are clear from equipment besides carts. - Fall bracelet on for fall risk patients  - Ensure room is clear and free of clutter  - Suction is set up for ALL patients (with vania)  - Hallways are clear from equipment besides carts.    - Isolation precautions followed, supplies available outside room, sign posted     Regina Elizabeth RN

## 2017-05-18 LAB
ABO + RH BLD: NORMAL
ALBUMIN SERPL BCP-MCNC: 2.4 G/DL (ref 3.4–5)
ALBUMIN/GLOB SERPL: 0.7 {RATIO} (ref 0.8–1.7)
ALP SERPL-CCNC: 218 U/L (ref 45–117)
ALT SERPL-CCNC: 23 U/L (ref 13–56)
ANION GAP BLD CALC-SCNC: 8 MMOL/L (ref 3–18)
AST SERPL W P-5'-P-CCNC: 22 U/L (ref 15–37)
BACTERIA SPEC CULT: ABNORMAL
BACTERIA SPEC CULT: ABNORMAL
BASOPHILS # BLD AUTO: 0.1 K/UL (ref 0–0.1)
BASOPHILS # BLD: 1 % (ref 0–2)
BILIRUB DIRECT SERPL-MCNC: 0.2 MG/DL (ref 0–0.2)
BILIRUB SERPL-MCNC: 0.6 MG/DL (ref 0.2–1)
BLD PROD TYP BPU: NORMAL
BLOOD GROUP ANTIBODIES SERPL: NORMAL
BPU ID: NORMAL
BUN SERPL-MCNC: 23 MG/DL (ref 7–18)
BUN/CREAT SERPL: 11 (ref 12–20)
CALCIUM SERPL-MCNC: 8.7 MG/DL (ref 8.5–10.1)
CALLED TO:,BCALL1: NORMAL
CHLORIDE SERPL-SCNC: 105 MMOL/L (ref 100–108)
CO2 SERPL-SCNC: 26 MMOL/L (ref 21–32)
CREAT SERPL-MCNC: 2.15 MG/DL (ref 0.6–1.3)
CROSSMATCH RESULT,%XM: NORMAL
DIFFERENTIAL METHOD BLD: ABNORMAL
EOSINOPHIL # BLD: 0.2 K/UL (ref 0–0.4)
EOSINOPHIL NFR BLD: 2 % (ref 0–5)
ERYTHROCYTE [DISTWIDTH] IN BLOOD BY AUTOMATED COUNT: 17.4 % (ref 11.6–14.5)
GLOBULIN SER CALC-MCNC: 3.4 G/DL (ref 2–4)
GLUCOSE BLD STRIP.AUTO-MCNC: 119 MG/DL (ref 70–110)
GLUCOSE BLD STRIP.AUTO-MCNC: 158 MG/DL (ref 70–110)
GLUCOSE BLD STRIP.AUTO-MCNC: 171 MG/DL (ref 70–110)
GLUCOSE BLD STRIP.AUTO-MCNC: 203 MG/DL (ref 70–110)
GLUCOSE BLD STRIP.AUTO-MCNC: 207 MG/DL (ref 70–110)
GLUCOSE BLD STRIP.AUTO-MCNC: 377 MG/DL (ref 70–110)
GLUCOSE SERPL-MCNC: 163 MG/DL (ref 74–99)
GRAM STN SPEC: ABNORMAL
HCT VFR BLD AUTO: 23.7 % (ref 35–45)
HGB BLD-MCNC: 7.7 G/DL (ref 12–16)
LYMPHOCYTES # BLD AUTO: 17 % (ref 21–52)
LYMPHOCYTES # BLD: 1.7 K/UL (ref 0.9–3.6)
MAGNESIUM SERPL-MCNC: 1.9 MG/DL (ref 1.6–2.6)
MCH RBC QN AUTO: 26.2 PG (ref 24–34)
MCHC RBC AUTO-ENTMCNC: 32.5 G/DL (ref 31–37)
MCV RBC AUTO: 80.6 FL (ref 74–97)
MONOCYTES # BLD: 0.7 K/UL (ref 0.05–1.2)
MONOCYTES NFR BLD AUTO: 7 % (ref 3–10)
NEUTS SEG # BLD: 7 K/UL (ref 1.8–8)
NEUTS SEG NFR BLD AUTO: 73 % (ref 40–73)
PLATELET # BLD AUTO: 263 K/UL (ref 135–420)
PMV BLD AUTO: 8.3 FL (ref 9.2–11.8)
POTASSIUM SERPL-SCNC: 3.3 MMOL/L (ref 3.5–5.5)
PROT SERPL-MCNC: 5.8 G/DL (ref 6.4–8.2)
RBC # BLD AUTO: 2.94 M/UL (ref 4.2–5.3)
SERVICE CMNT-IMP: ABNORMAL
SERVICE CMNT-IMP: ABNORMAL
SODIUM SERPL-SCNC: 139 MMOL/L (ref 136–145)
SPECIMEN EXP DATE BLD: NORMAL
STATUS OF UNIT,%ST: NORMAL
UNIT DIVISION, %UDIV: 0
WBC # BLD AUTO: 9.6 K/UL (ref 4.6–13.2)

## 2017-05-18 PROCEDURE — 80048 BASIC METABOLIC PNL TOTAL CA: CPT | Performed by: NURSE PRACTITIONER

## 2017-05-18 PROCEDURE — 74011250637 HC RX REV CODE- 250/637: Performed by: FAMILY MEDICINE

## 2017-05-18 PROCEDURE — 82962 GLUCOSE BLOOD TEST: CPT

## 2017-05-18 PROCEDURE — 85025 COMPLETE CBC W/AUTO DIFF WBC: CPT | Performed by: NURSE PRACTITIONER

## 2017-05-18 PROCEDURE — 77010033678 HC OXYGEN DAILY

## 2017-05-18 PROCEDURE — 74011250637 HC RX REV CODE- 250/637: Performed by: INTERNAL MEDICINE

## 2017-05-18 PROCEDURE — 65270000029 HC RM PRIVATE

## 2017-05-18 PROCEDURE — 77030011256 HC DRSG MEPILEX <16IN NO BORD MOLN -A

## 2017-05-18 PROCEDURE — 36415 COLL VENOUS BLD VENIPUNCTURE: CPT | Performed by: NURSE PRACTITIONER

## 2017-05-18 PROCEDURE — 74011250636 HC RX REV CODE- 250/636: Performed by: INTERNAL MEDICINE

## 2017-05-18 PROCEDURE — 74011000258 HC RX REV CODE- 258: Performed by: INTERNAL MEDICINE

## 2017-05-18 PROCEDURE — 74011000250 HC RX REV CODE- 250: Performed by: UROLOGY

## 2017-05-18 PROCEDURE — 83735 ASSAY OF MAGNESIUM: CPT | Performed by: NURSE PRACTITIONER

## 2017-05-18 PROCEDURE — 94640 AIRWAY INHALATION TREATMENT: CPT

## 2017-05-18 PROCEDURE — 74011636637 HC RX REV CODE- 636/637: Performed by: FAMILY MEDICINE

## 2017-05-18 PROCEDURE — 65270000032 HC RM SEMIPRIVATE

## 2017-05-18 PROCEDURE — 80076 HEPATIC FUNCTION PANEL: CPT | Performed by: NURSE PRACTITIONER

## 2017-05-18 PROCEDURE — 74011636637 HC RX REV CODE- 636/637: Performed by: INTERNAL MEDICINE

## 2017-05-18 PROCEDURE — 74011250637 HC RX REV CODE- 250/637: Performed by: HOSPITALIST

## 2017-05-18 RX ORDER — INSULIN GLARGINE 100 [IU]/ML
7 INJECTION, SOLUTION SUBCUTANEOUS DAILY
Status: DISCONTINUED | OUTPATIENT
Start: 2017-05-18 | End: 2017-05-24

## 2017-05-18 RX ORDER — POTASSIUM CHLORIDE 20 MEQ/1
40 TABLET, EXTENDED RELEASE ORAL
Status: COMPLETED | OUTPATIENT
Start: 2017-05-18 | End: 2017-05-18

## 2017-05-18 RX ADMIN — LACTOBACILLUS TAB 1 TABLET: TAB at 18:10

## 2017-05-18 RX ADMIN — IPRATROPIUM BROMIDE AND ALBUTEROL SULFATE 3 ML: 2.5; .5 SOLUTION RESPIRATORY (INHALATION) at 15:41

## 2017-05-18 RX ADMIN — CHOLECALCIFEROL TAB 25 MCG (1000 UNIT) 2000 UNITS: 25 TAB at 10:27

## 2017-05-18 RX ADMIN — Medication 2 TABLET: at 18:09

## 2017-05-18 RX ADMIN — INSULIN LISPRO 3 UNITS: 100 INJECTION, SOLUTION INTRAVENOUS; SUBCUTANEOUS at 08:00

## 2017-05-18 RX ADMIN — Medication 10 ML: at 08:00

## 2017-05-18 RX ADMIN — ALLOPURINOL 50 MG: 100 TABLET ORAL at 10:27

## 2017-05-18 RX ADMIN — DOCUSATE SODIUM 100 MG: 100 CAPSULE, LIQUID FILLED ORAL at 10:27

## 2017-05-18 RX ADMIN — MEROPENEM 500 MG: 500 INJECTION, POWDER, FOR SOLUTION INTRAVENOUS at 11:35

## 2017-05-18 RX ADMIN — MIDODRINE HYDROCHLORIDE 10 MG: 2.5 TABLET ORAL at 18:10

## 2017-05-18 RX ADMIN — IPRATROPIUM BROMIDE AND ALBUTEROL SULFATE 3 ML: 2.5; .5 SOLUTION RESPIRATORY (INHALATION) at 21:05

## 2017-05-18 RX ADMIN — FLUCONAZOLE 200 MG: 2 INJECTION INTRAVENOUS at 10:28

## 2017-05-18 RX ADMIN — IPRATROPIUM BROMIDE AND ALBUTEROL SULFATE 3 ML: 2.5; .5 SOLUTION RESPIRATORY (INHALATION) at 08:20

## 2017-05-18 RX ADMIN — INSULIN LISPRO 3 UNITS: 100 INJECTION, SOLUTION INTRAVENOUS; SUBCUTANEOUS at 13:33

## 2017-05-18 RX ADMIN — INSULIN GLARGINE 7 UNITS: 100 INJECTION, SOLUTION SUBCUTANEOUS at 11:39

## 2017-05-18 RX ADMIN — FAMOTIDINE 20 MG: 20 TABLET ORAL at 10:27

## 2017-05-18 RX ADMIN — Medication 10 ML: at 13:35

## 2017-05-18 RX ADMIN — MIDODRINE HYDROCHLORIDE 10 MG: 2.5 TABLET ORAL at 10:34

## 2017-05-18 RX ADMIN — INSULIN LISPRO 6 UNITS: 100 INJECTION, SOLUTION INTRAVENOUS; SUBCUTANEOUS at 18:10

## 2017-05-18 RX ADMIN — PRAVASTATIN SODIUM 40 MG: 20 TABLET ORAL at 22:34

## 2017-05-18 RX ADMIN — LACTOBACILLUS TAB 1 TABLET: TAB at 10:34

## 2017-05-18 RX ADMIN — MEROPENEM 500 MG: 500 INJECTION, POWDER, FOR SOLUTION INTRAVENOUS at 22:38

## 2017-05-18 RX ADMIN — POTASSIUM CHLORIDE 40 MEQ: 20 TABLET, EXTENDED RELEASE ORAL at 12:00

## 2017-05-18 RX ADMIN — FOLIC ACID 1 MG: 1 TABLET ORAL at 10:27

## 2017-05-18 NOTE — PROGRESS NOTES
Progress Note    Patient: Clarence Castillo MRN: [id number]  SSN: [id number]    YOB: 1950  Age: 77 y.o.   Sex: female      Admit Date: 4/20/2017    LOS: 28 days     Subjective:     VIRTUALLY NO DRAINAGE FROM  PERC DRAIN IN LLQ  NO ABD PAIN  DENIES  N/V  TEMP DOWN    Objective:     Vitals:    05/17/17 2000 05/18/17 0015 05/18/17 0737 05/18/17 0807   BP: 137/67 139/66  147/71   Pulse: 87 77  81   Resp: 20 18 18   Temp: 96.9 °F (36.1 °C) 98.8 °F (37.1 °C)  99 °F (37.2 °C)   TempSrc:       SpO2: 97% 97%  97%   Weight:   258 lb (117 kg)    Height:   5' 7\" (1.702 m)         Intake and Output:  Current Shift:    Last three shifts: 05/16 1901 - 05/18 0700  In: 1278.8 [I.V.:954.2]  Out: 2110 [Urine:2100; Drains:10]    Current Facility-Administered Medications   Medication Dose Route Frequency    0.9% sodium chloride infusion 250 mL  250 mL IntraVENous PRN    midodrine (PROAMITINE) tablet 10 mg  10 mg Oral Q8H    fluconazole (DIFLUCAN) 200mg/100 mL IVPB (premix)  200 mg IntraVENous Q24H    meropenem (MERREM) 500 mg in 0.9% sodium chloride (MBP/ADV) 50 mL MBP  0.5 g IntraVENous Q12H    insulin lispro (HUMALOG) injection   SubCUTAneous Q6H    acetaminophen (TYLENOL) suppository 650 mg  650 mg Rectal Q6H PRN    acetaminophen (TYLENOL) tablet 500 mg  500 mg Oral Q6H PRN    diclofenac (VOLTAREN) 1 % topical gel 2 g  2 g Topical Q8H PRN    allopurinol (ZYLOPRIM) tablet 50 mg  50 mg Oral DAILY    aluminum-magnesium hydroxide (MAALOX) oral suspension 15 mL  15 mL Oral QID PRN    albuterol-ipratropium (DUO-NEB) 2.5 MG-0.5 MG/3 ML  3 mL Nebulization Q4HWA RT    Lactobacillus Acidoph & Bulgar (FLORANEX) tablet 1 Tab  1 Tab Oral BID    sodium chloride (NS) flush 5-10 mL  5-10 mL IntraVENous Q8H    naloxone (NARCAN) injection 0.1 mg  0.1 mg IntraVENous Multiple    acetaminophen (TYLENOL) tablet 650 mg  650 mg Oral Q4H PRN    famotidine (PEPCID) tablet 20 mg  20 mg Oral DAILY    0.9% sodium chloride infusion 250 mL  250 mL IntraVENous PRN    diphenhydrAMINE (BENADRYL) capsule 50 mg  50 mg Oral Q4H PRN    polyethylene glycol (MIRALAX) packet 17 g  17 g Oral TID    cholecalciferol (VITAMIN D3) tablet 2,000 Units  2,000 Units Oral DAILY    docusate sodium (COLACE) capsule 100 mg  100 mg Oral DAILY AFTER BREAKFAST    oxyCODONE-acetaminophen (PERCOCET 7.5) 7.5-325 mg per tablet 1 Tab  1 Tab Oral Q4H PRN    pravastatin (PRAVACHOL) tablet 40 mg  40 mg Oral QHS    senna-docusate (PERICOLACE) 8.6-50 mg per tablet 2 Tab  2 Tab Oral PCD    folic acid (FOLVITE) tablet 1 mg  1 mg Oral DAILY    ondansetron (ZOFRAN) injection 4 mg  4 mg IntraVENous Q8H PRN    glucose chewable tablet 16 g  16 g Oral PRN    glucagon (GLUCAGEN) injection 1 mg  1 mg IntraMUSCular PRN    dextrose (D50W) injection syrg 12.5-25 g  25-50 mL IntraVENous PRN         Physical Exam:   GENERAL: alert, cooperative, no distress, appears stated age  ABDOMEN: soft, non-tender.  Bowel sounds normal. No masses,  no organomegaly  FLANK:  NON TENDER      Lab/Data Review:  BMP:   Lab Results   Component Value Date/Time     05/18/2017 04:11 AM    K 3.3 (L) 05/18/2017 04:11 AM     05/18/2017 04:11 AM    CO2 26 05/18/2017 04:11 AM    AGAP 8 05/18/2017 04:11 AM     (H) 05/18/2017 04:11 AM    BUN 23 (H) 05/18/2017 04:11 AM    CREA 2.15 (H) 05/18/2017 04:11 AM    GFRAA 28 (L) 05/18/2017 04:11 AM    GFRNA 23 (L) 05/18/2017 04:11 AM     CMP:   Lab Results   Component Value Date/Time     05/18/2017 04:11 AM    K 3.3 (L) 05/18/2017 04:11 AM     05/18/2017 04:11 AM    CO2 26 05/18/2017 04:11 AM    AGAP 8 05/18/2017 04:11 AM     (H) 05/18/2017 04:11 AM    BUN 23 (H) 05/18/2017 04:11 AM    CREA 2.15 (H) 05/18/2017 04:11 AM    GFRAA 28 (L) 05/18/2017 04:11 AM    GFRNA 23 (L) 05/18/2017 04:11 AM    CA 8.7 05/18/2017 04:11 AM    MG 1.9 05/18/2017 04:11 AM    ALB 2.4 (L) 05/18/2017 04:11 AM    TP 5.8 (L) 05/18/2017 04:11 AM    GLOB 3.4 05/18/2017 04:11 AM    AGRAT 0.7 (L) 05/18/2017 04:11 AM    SGOT 22 05/18/2017 04:11 AM    ALT 23 05/18/2017 04:11 AM     CBC:   Lab Results   Component Value Date/Time    WBC 9.6 05/18/2017 04:11 AM    HGB 7.7 (L) 05/18/2017 04:11 AM    HCT 23.7 (L) 05/18/2017 04:11 AM     05/18/2017 04:11 AM          CT Results:    Results from Hospital Encounter encounter on 04/20/17   CT ABD PELV WO CONT   Narrative CT abdomen and pelvis without IV or oral contrast    INDICATION: Evaluate fluid collection and new left upper quadrant pain. Comparison May 4, 2017    All CT scans at this facility are performed using dose optimization technique as  appropriate to a performed exam, to include automated exposure control,  adjustment of the mA and/or kV according to patient size (including appropriate  matching for site specific examination) or use of iterative reconstruction  technique. More consolidation/pneumonia in the right lower lobe. Less severe left lower  lobe pneumonia when compared to prior study. No significant effusion. Cardiomegaly with no pericardial effusion. The right psoas muscle is slightly smaller. Draining catheter is still in  place. No large fluid collection surrounding the catheter. Small residual  collection not completely excluded without IV contrast.    Also slightly smaller presacral fluid. Small abscess or loculation not  completely excluded but no focal collection identified. No new abnormalities in the left upper quadrant. No left upper quadrant  inflammation or free air. Splenomegaly is unchanged. Pancreas, liver, adrenal  glands and kidneys are stable with large left renal cyst. Aorta is normal in  caliber. IVC filter in place. More subcutaneous edema/anasarca along both sides. No discrete fluid collection. No bowel obstruction. Fluid in the colon, mild colitis, diarrheal disease  possible.  However, moderate fecal material retention in the rectosigmoid colon  and descending colon noted. Impression IMPRESSION:  1. Smaller right psoas muscle implying smaller intramuscular fluid collection  and less edema/inflammation. Draining catheter is still in place. Limited  evaluation without IV contrast. Slightly smaller presacral fluid with no  discrete collection. 2. Less severe left lower lobe pulmonary consolidation but more severe right  lower lobe consolidation. No pleural effusion. 3. Worsening anasarca/subcutaneous edema on both sides. 4. More fluid in the ascending and transverse colon, colitis or diarrheal  disease? US Results:    Results from [place] encounter on 04/20/17   US CHEST   Narrative Ultrasound of the chest    HISTORY: Pleural effusion. COMPARISON: Chest x-ray May 16, 2017. FINDINGS: Images of the right-sided chest was performed. There is no significant  pleural fluid present for safe thoracentesis. Thoracentesis was not performed. Impression IMPRESSION: Insufficient volume of right pleural effusion for thoracentesis.           Assessment:     Principal Problem:    Acute paraplegia ([address] (4/20/2017)    Active Problems:    Type 2 diabetes mellitus with diabetic neuropathy ([address] (6/28/2011)      AICD generator infection ([address] (8/20/2012)      Overview: S/p explant 4 leads 8/20/12      Benign hypertensive heart disease with systolic CHF, NYHA class 2 ([address] (9/5/2012)      Decreased calculated glomerular filtration rate (GFR) (3/30/2017)      Overview: Calculated GFR equivalent to that of CKD stage 3 = 30-59 ml/min      Iliopsoas muscle hematoma (3/30/2017)      Psoas hematoma, right, secondary to anticoagulant therapy (3/30/2017)      Impaired mobility and ADLs (3/30/2017)      History of Coumadin therapy ()      Overview: Anticoagulation for chronic atrial fibrillation; Discontinued on 3/30/2017      Sepsis ([address] (4/20/2017)      Psoas abscess, right ([address] (4/20/2017)      Krueger catheter in place on admission (4/20/2017)      Urinary tract infection due to Enterococcus (4/20/2017)      Group B streptococcal infection (4/20/2017)        Plan:     WOULD PLAN ON REMOVING   DRAIN NEXT WK IF IT STAYS  DRY  WILL NEED BARNETT UNTIL  BLADDER FUNCTIONING CAN BE ASSESSED  WHEN  STABLE FROM  SPINAL CORD INJURY    Signed By: Trey Welsh MD , FACS    May 18, 2017      PAGER:  [phone]  CELL:  [address]:  [address] [facility]   [phone]

## 2017-05-18 NOTE — DIABETES MGMT
GLYCEMIC CONTROL PLAN OF CARE    Recommendations: Called Dr. Nano Quevedo and obtained order to add basal lantus insulin 7 units daily starting 05/18/2017. Continue to monitor POC BG and adjust insulin dose as needed. Assessment:  Patient is 77year old with past medical history including type 2 diabetes mellitus, diabetic neuropathy, CHF, dyslipidemia, obstructive sleep apnea, complete heart block, AICD placement - was being prepared for discharge from rehab when patient develop sudden weakness and c/o of increasing burning sensation of the right leg. Patient was admitted to acute care on 04/20/2017. Noted:  Acute paraplegia. Sepsis. Iliopsoas hematoma, infected. DVT bilateral lower extremities. Status post IVC filter on 05/11/2017. Type 2 diabetes mellitus with A1C of 8.0% (03/30/2017). POC BG report on 05/17/2017: 144 mg/dL. POC BG report on 05/18/2017 at time of review: 377, 203, 171 mg/dL. Patient stated that she is not eating at least 50% of meal    Most recent blood glucose values:    Results for Leni Jackson (MRN [id number]) as of 5/12/2017 11:50   Ref. Range 5/11/2017 06:53 5/11/2017 11:39 5/11/2017 15:57 5/11/2017 22:00   GLUCOSE,FAST - POC Latest Ref Range: 70 - 110 mg/dL 146 (H) 196 (H) 170 (H) 126 (H)     Results for Leni Jackson (MRN [id number]) as of 5/12/2017 11:50   Ref. Range 5/12/2017 07:02 5/12/2017 11:29   GLUCOSE,FAST - POC Latest Ref Range: 70 - 110 mg/dL 106 122 (H)     Current A1C of 8.0% (03/30/2017) is equivalent to average blood glucose of 183 mg/dL during the past 2-3 months.      Current hospital diabetes medications:   Lantus insulin 7 units daily, first dose ordered 05/18/2017. Correctional Lispro insulin ACHS. Very resistant dose.      Previous day's insulin requirements: 05/17/2017  Lispro: None. Patient did not require insulin coverage.      Home diabetes medications: Per educational notes, 03/31/2017:  Januvia 50 mg daily every morning.   Lantus insulin 15 units nightly Novolog insulin per sliding scale      Diet: Diabetic Consistent Carbohydrate pureed; 2 honey, nutr suppl: magic cups all meals. Goals: Patient's blood glucose will be within target range on  mg/dL by 05/21/2017.     Education:  __x__Refer to Diabetes Education Record: 3/31/2017            ____Education not indicated at this time      Yaima Hill RN

## 2017-05-18 NOTE — PROGRESS NOTES
Spoke with this pt's daughter who request that this pt be placed in a rehab facility. Daughter was encouraged to file a medicaid application and have a UAI completed to assist with this pt's return home after her rehab.

## 2017-05-18 NOTE — PROGRESS NOTES
Infectious Disease Progress Note    Requested by: Dr. Soo Guzman, dr. Amari Hsu    Reason: sepsis, acute paraplegia    Current abx Prior abx   Meropenem since 5/15   Cefepime 4/20-4/21  vancomycin  4/20-4/24  Gentamicin 4/21-4/24  Metronidazole  4/20-4/24  Ceftriaxone  4/24- 5/15  linezolid 5/14-5/17     Lines:   PICC RUE 4/26    Assessment :    77 y.o., right handed female, with an established history of hypertension, complete heart block with permanent pacemaker placed, diabetes mellitus, diabetic peripheral neuropathy, obesity, admitted to [facility] on 4/20/17. Clinical presentation consistent with  Sepsis (POA)  secondary to group B streptococcus bloodstream infection (positive blood cultures 4/20, negative blood cultures 4/21). Most likely source of bloodstream infection is infected right psoas hematoma/abscess. 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 No signs of neurological recovery on today's exam     2 week h/o pain at the site of abdominal pacemaker, tenderness at the site - however, patient doesn't have erythema at the site of pacemaker. No fluid collection noted at pacemaker pocket site per usg 4/24. Quick clearance of bacteremia would argue against endocarditis/endovascular infection. At this time risk of removal of pacemaker/general pocket change exceed the benefit. Discussed with cardiology. Would recommend close monitoring. Abdominal usg 4/24 doesn't reveal evidence of abscess/fluid collection at the site of pacemaker pocket. Enterococcus in urine cultures 4/20 likely colonizer    Acute renal failure: nephrology consult appreciated. Difficult to determine exact etiology of ARF at this time. Slight improvement in creatinine today.      Low grade fever overnight - Ct scan 5/4 reveals increasing focal area within the right psoas muscle inferior to the previously drained area. could represent either spread of infection inferiorly or intramuscular hematoma. S/p ct guided IR drain check - old drain removed. New drain placed inferiorly on 5/10    No evidence of pneumonia    High grade fevers with tm:102 on 5/10, tm:103 on 5/13 - ?due to infected picc line. two of two sets of blood cultures 5/14 positive for coagulase negative staphylococcus. ?right sided aspiration pneumonia - unable to perform thoracentesis since inadequate fluid. One of two sets of blood cultures could be contaminant. Will need to follow repeat blood cultures to determine this. Acute renal failure: multifactorial - nephrology consult appreciated     bilateral LE dvt - s/p IVC filter placement 5/11    No leukocytosis     Resolved fevers. Mid abdominal pain today. No evidence of abdominal wall cellulitis noted on ct scan. Fecal impaction rectosigmoid - could be contributing to abdominal pain. Improved psoas edema/inflammation    Recommendations:    1. cont meropenem for aspiration/hcap till 5/22, cont fluconazole to cover for possible candida cystitis. Will switch back to ceftriaxone to complete treatment for infected psoas abscess once done with meropenem  2. f/u urine cultures  3. Ok to leave picc line for now  4. Hopefully d/c soon    Above plan was discussed in details with rn, . Please call me if any further questions or concerns. Will continue to participate in the care of this patient. subjective:    C/o midabdominal pain. Denies cp, sob. Unable to move legs. No nausea, vomiting. No new rash/itching/joint pain/back pain. Home Medication List    Details   gabapentin (NEURONTIN) 400 mg capsule Take 1 Cap by mouth two (2) times a day. Indications: NEUROPATHIC PAIN  Qty: 30 Cap, Refills: 0    Associated Diagnoses: Iliopsoas muscle hematoma, right, subsequent encounter      cholecalciferol (VITAMIN D3) 1,000 unit tablet Take 2 Tabs by mouth daily.  Indications: PREVENTION OF VITAMIN D DEFICIENCY  Qty: 30 Tab, Refills: 0      SITagliptin (JANUVIA) 50 mg tablet Take 50 mg by mouth daily. Indications: type 2 diabetes mellitus      potassium chloride (KLOR-CON M20) 20 mEq tablet Take 20 mEq by mouth two (2) times a day. Indications: HYPOKALEMIA PREVENTION      ondansetron (ZOFRAN ODT) 4 mg disintegrating tablet Take 4 mg by mouth every eight (8) hours as needed for Nausea. cyclobenzaprine (FLEXERIL) 10 mg tablet Take 10 mg by mouth as needed for Muscle Spasm(s). Indications: MUSCLE SPASM      carvedilol (COREG) 6.25 mg tablet Take 1 Tab by mouth two (2) times daily (with meals). Indications: hypertension  Qty: 30 Tab, Refills: 0    Associated Diagnoses: Benign hypertensive heart disease with systolic CHF, NYHA class 2 (HCC)      acetaminophen (TYLENOL) 325 mg tablet Take 2 Tabs by mouth every four (4) hours as needed (for fever or pain level less than 5/10). Indications: Fever, Pain  Qty: 30 Tab, Refills: 0    Associated Diagnoses: Iliopsoas muscle hematoma, right, subsequent encounter      allopurinol (ZYLOPRIM) 100 mg tablet Take 1 Tab by mouth daily. Indications: GOUT  Qty: 15 Tab, Refills: 0    Associated Diagnoses: Chronic gout, unspecified cause, unspecified site      insulin glargine (LANTUS) 100 unit/mL injection 15 Units by SubCUTAneous route nightly. Indications: type 2 diabetes mellitus  Qty: 1 Vial, Refills: 0    Associated Diagnoses: Type 2 diabetes mellitus with diabetic neuropathy, with long-term current use of insulin ([facility])      docusate sodium (COLACE) 100 mg capsule Take 1 Cap by mouth daily (after breakfast). Indications: Constipation  Qty: 15 Cap, Refills: 0      senna-docusate (PERICOLACE) 8.6-50 mg per tablet Take 2 Tabs by mouth daily (after dinner). Indications: Constipation  Qty: 30 Tab, Refills: 0      oxyCODONE-acetaminophen (PERCOCET 7.5) 7.5-325 mg per tablet Take 1 Tab by mouth every four (4) hours as needed (for pain level greater than 4/10).  Max Daily Amount: 6 Tabs. Indications: Pain  Qty: 50 Tab, Refills: 0    Associated Diagnoses: Iliopsoas muscle hematoma, right, subsequent encounter      bumetanide (BUMEX) 1 mg tablet TAKE 1 TABLET TWICE A DAY  Qty: 180 Tab, Refills: 1      pravastatin (PRAVACHOL) 40 mg tablet Take 40 mg by mouth nightly. Indications: DYSLIPIDEMIA      ferrous sulfate 325 mg (65 mg iron) tablet Take 1 Tab by mouth two (2) times daily (with meals).   Qty: 30 Tab, Refills: 0      insulin aspart (NOVOLOG) 100 unit/mL injection INITIATE INSULIN CORRECTIVE PROTOCOL (HR): Normal Insulin Sensitivity  For Blood Sugar (mg/dL) of:    Less than 150 =   0 units          150 -199 =   2 units 200 -249 =   4 units 250 -299 =   6 units 300 -349 =   8 units 350 and above =   10 units If 2 glucose readings are above 200 mg/dL  Qty: 10 mL, Refills: 6             Current Facility-Administered Medications   Medication Dose Route Frequency    0.9% sodium chloride infusion 250 mL  250 mL IntraVENous PRN    midodrine (PROAMITINE) tablet 10 mg  10 mg Oral Q8H    fluconazole (DIFLUCAN) 200mg/100 mL IVPB (premix)  200 mg IntraVENous Q24H    meropenem (MERREM) 500 mg in 0.9% sodium chloride (MBP/ADV) 50 mL MBP  0.5 g IntraVENous Q12H    insulin lispro (HUMALOG) injection   SubCUTAneous Q6H    acetaminophen (TYLENOL) suppository 650 mg  650 mg Rectal Q6H PRN    acetaminophen (TYLENOL) tablet 500 mg  500 mg Oral Q6H PRN    diclofenac (VOLTAREN) 1 % topical gel 2 g  2 g Topical Q8H PRN    allopurinol (ZYLOPRIM) tablet 50 mg  50 mg Oral DAILY    aluminum-magnesium hydroxide (MAALOX) oral suspension 15 mL  15 mL Oral QID PRN    albuterol-ipratropium (DUO-NEB) 2.5 MG-0.5 MG/3 ML  3 mL Nebulization Q4HWA RT    Lactobacillus Acidoph & Bulgar (FLORANEX) tablet 1 Tab  1 Tab Oral BID    sodium chloride (NS) flush 5-10 mL  5-10 mL IntraVENous Q8H    naloxone (NARCAN) injection 0.1 mg  0.1 mg IntraVENous Multiple    acetaminophen (TYLENOL) tablet 650 mg  650 mg Oral Q4H PRN    famotidine (PEPCID) tablet 20 mg  20 mg Oral DAILY    0.9% sodium chloride infusion 250 mL  250 mL IntraVENous PRN    diphenhydrAMINE (BENADRYL) capsule 50 mg  50 mg Oral Q4H PRN    polyethylene glycol (MIRALAX) packet 17 g  17 g Oral TID    cholecalciferol (VITAMIN D3) tablet 2,000 Units  2,000 Units Oral DAILY    docusate sodium (COLACE) capsule 100 mg  100 mg Oral DAILY AFTER BREAKFAST    oxyCODONE-acetaminophen (PERCOCET 7.5) 7.5-325 mg per tablet 1 Tab  1 Tab Oral Q4H PRN    pravastatin (PRAVACHOL) tablet 40 mg  40 mg Oral QHS    senna-docusate (PERICOLACE) 8.6-50 mg per tablet 2 Tab  2 Tab Oral PCD    folic acid (FOLVITE) tablet 1 mg  1 mg Oral DAILY    ondansetron (ZOFRAN) injection 4 mg  4 mg IntraVENous Q8H PRN    glucose chewable tablet 16 g  16 g Oral PRN    glucagon (GLUCAGEN) injection 1 mg  1 mg IntraMUSCular PRN    dextrose (D50W) injection syrg 12.5-25 g  25-50 mL IntraVENous PRN       Allergies: Vancomycin; Ampicillin; Bactrim [sulfamethoxazole-trimethoprim]; Blueberry; Ciprofloxacin; Codeine; Crestor [rosuvastatin]; Darvocet a500 [propoxyphene n-acetaminophen]; Demerol [meperidine]; Levaquin [levofloxacin]; Lipitor [atorvastatin]; Magnesium oxide; Minocin [minocycline]; Pcn [penicillins]; Pravachol [pravastatin]; Sulfa (sulfonamide antibiotics); Ultracet [tramadol-acetaminophen]; Vicodin [hydrocodone-acetaminophen]; Vytorin 10-10 [ezetimibe-simvastatin]; and Percodan [oxycodone hcl-oxycodone-asa]    Temp (24hrs), Av.4 °F (36.9 °C), Min:96.9 °F (36.1 °C), Max:99 °F (37.2 °C)    Visit Vitals    /71 (BP 1 Location: Left arm, BP Patient Position: Head of bed elevated (Comment degrees))    Pulse 81    Temp 99 °F (37.2 °C)    Resp 18    Ht 5' 7\" (1.702 m)    Wt 117 kg (258 lb)    SpO2 93%    Breastfeeding No    BMI 40.41 kg/m2       ROS: patient unable to communicate fluently. Detailed ros not feasible.     Physical Exam:    General: Well developed, well nourished female laying on the bed, AAOx3 NAD    General:   awake alert and oriented   HEENT:  Normocephalic, atraumatic, PERRL, EOMI, no scleral icterus or pallor; no conjunctival hemmohage;  nasal and oral mucous are moist and without evidence of lesions. Neck supple, no bruits. Lymph Nodes:   no cervical, axillary or inguinal adenopathy   Lungs:   non-labored, bilaterally clear to auscultation- no crackles wheezes rales or rhonchi   Heart:   s1 and s2 irregular; no rubs or gallops, no edema, + pedal pulses   Abdomen:  soft, non-distended, active bowel sounds, no hepatomegaly, no splenomegaly. no tenderness over the left abdominal pacemaker, no overlying erythema or fluctuance, drain right lower abdomen with bloody drainage, mild linnette umbilical tenderness   Genitourinary:  deferred   Extremities:   no clubbing, cyanosis; no joint effusions or swelling; muscle mass appropriate for age   Neurologic:  No gross focal sensory abnormalities; 5/5 muscle strength to upper extremities. 0/5 strength in lower extremities.   Cranial nerves intact                        Skin:  Surgical scars abdomen, left neck well healed   Back:   no paraspinal muscle guarding or rigidity, no right CVA tenderness     Psychiatric:  No suicidal or homicidal ideations, appropriate mood and affect         Labs: Results:   Chemistry Recent Labs      05/18/17 0411 05/17/17 0142 05/16/17 0315   GLU  163*  131*  63*   NA  139  137  137   K  3.3*  3.6  3.7   CL  105  103  104   CO2  26  25  26   BUN  23*  22*  28*   CREA  2.15*  2.50*  2.88*   CA  8.7  8.7  8.8   AGAP  8  9  7   BUCR  11*  9*  10*   AP  218*  250*  296*   TP  5.8*  6.1*  6.4   ALB  2.4*  2.9*  2.3*   GLOB  3.4  3.2  4.1*   AGRAT  0.7*  0.9  0.6*      CBC w/Diff Recent Labs      05/18/17 0411 05/17/17 0142 05/16/17 0315   WBC  9.6  9.4  10.5   RBC  2.94*  2.49*  2.70*   HGB  7.7*  6.6*  7.1*   HCT  23.7*  20.5*  22.0*   PLT  263  217  254   GRANS  73  81*  72   LYMPH 17*  11*  9*   EOS  2  1  1      Microbiology Recent Labs      05/15/17   1026  05/15/17   0900   CULT  NO GROWTH 3 DAYS  NO GROWTH 3 DAYS          RADIOLOGY:    All available imaging studies/reports in [facility] for this admission were reviewed    Dr. Jose Dasilva, Infectious Disease Specialist  [phone]  May 18, 2017  3:49 PM

## 2017-05-18 NOTE — PROGRESS NOTES
RENAL DAILY PROGRESS NOTE    Patient: Clarence Castillo               Sex: female          DOA: 4/20/2017  3:36 PM        YOB: 1950      Age:  77 y.o.        LOS:  LOS: 28 days     Subjective:     Clarence Castillo is a 77 y.o.  who presents with Acute paraplegia  Acute paraplegia ([facility] [address] Asked to evaluate for acute/crf stage 3.admitted with paraplegias,psoas muscle hematoma,s/p drain placement. hx of dm,gout  Chief complains: Patient c/o abd pain  - Reviewed last 24 hrs events     Current Facility-Administered Medications   Medication Dose Route Frequency    insulin glargine (LANTUS) injection 7 Units  7 Units SubCUTAneous DAILY    0.9% sodium chloride infusion 250 mL  250 mL IntraVENous PRN    midodrine (PROAMITINE) tablet 10 mg  10 mg Oral Q8H    fluconazole (DIFLUCAN) 200mg/100 mL IVPB (premix)  200 mg IntraVENous Q24H    meropenem (MERREM) 500 mg in 0.9% sodium chloride (MBP/ADV) 50 mL MBP  0.5 g IntraVENous Q12H    insulin lispro (HUMALOG) injection   SubCUTAneous Q6H    acetaminophen (TYLENOL) suppository 650 mg  650 mg Rectal Q6H PRN    acetaminophen (TYLENOL) tablet 500 mg  500 mg Oral Q6H PRN    diclofenac (VOLTAREN) 1 % topical gel 2 g  2 g Topical Q8H PRN    allopurinol (ZYLOPRIM) tablet 50 mg  50 mg Oral DAILY    aluminum-magnesium hydroxide (MAALOX) oral suspension 15 mL  15 mL Oral QID PRN    albuterol-ipratropium (DUO-NEB) 2.5 MG-0.5 MG/3 ML  3 mL Nebulization Q4HWA RT    Lactobacillus Acidoph & Bulgar (FLORANEX) tablet 1 Tab  1 Tab Oral BID    sodium chloride (NS) flush 5-10 mL  5-10 mL IntraVENous Q8H    naloxone (NARCAN) injection 0.1 mg  0.1 mg IntraVENous Multiple    acetaminophen (TYLENOL) tablet 650 mg  650 mg Oral Q4H PRN    famotidine (PEPCID) tablet 20 mg  20 mg Oral DAILY    0.9% sodium chloride infusion 250 mL  250 mL IntraVENous PRN    diphenhydrAMINE (BENADRYL) capsule 50 mg  50 mg Oral Q4H PRN    polyethylene glycol (MIRALAX) packet 17 g  17 g Oral TID    cholecalciferol (VITAMIN D3) tablet 2,000 Units  2,000 Units Oral DAILY    docusate sodium (COLACE) capsule 100 mg  100 mg Oral DAILY AFTER BREAKFAST    oxyCODONE-acetaminophen (PERCOCET 7.5) 7.5-325 mg per tablet 1 Tab  1 Tab Oral Q4H PRN    pravastatin (PRAVACHOL) tablet 40 mg  40 mg Oral QHS    senna-docusate (PERICOLACE) 8.6-50 mg per tablet 2 Tab  2 Tab Oral PCD    folic acid (FOLVITE) tablet 1 mg  1 mg Oral DAILY    ondansetron (ZOFRAN) injection 4 mg  4 mg IntraVENous Q8H PRN    glucose chewable tablet 16 g  16 g Oral PRN    glucagon (GLUCAGEN) injection 1 mg  1 mg IntraMUSCular PRN    dextrose (D50W) injection syrg 12.5-25 g  25-50 mL IntraVENous PRN       Objective:     Visit Vitals    /78 (BP 1 Location: Left arm, BP Patient Position: At rest)    Pulse 82    Temp 98.2 °F (36.8 °C)    Resp 18    Ht 5' 7\" (1.702 m)    Wt 117 kg (258 lb)    SpO2 98%    Breastfeeding No    BMI 40.41 kg/m2       Intake/Output Summary (Last 24 hours) at 05/18/17 1539  Last data filed at 05/18/17 1415   Gross per 24 hour   Intake              660 ml   Output             1260 ml   Net             -600 ml       Physical Examination:     RS: Chest is bilateral equal, no wheezing / rales / crackles  CVS: S1-S2 heard, RRR, No S3 / murmur  Abdomen: sl tender  Extremities: + edema  CNS: Awake & follows commands, CN II-XII are grossly intact. HEENT: Head is atraumatic, PERRLA, conjunctiva pink & non icteric. No JVD or carotid bruit   Musculoskeletal: No gross joints or bone deformities   Lymph Node: No palpable cervical, axillary or groin lymphadenopathy.       Data Review:      Labs:     Hematology:   Recent Labs      05/18/17 0411 05/17/17   0142  05/16/17   0315   WBC  9.6  9.4  10.5   HGB  7.7*  6.6*  7.1*   HCT  23.7*  20.5*  22.0*     Chemistry:   Recent Labs      05/18/17 0411 05/17/17   0142  05/16/17   0315   BUN  23*  22*  28*   CREA  2.15*  2.50*  2.88*   CA  8.7  8.7  8.8   ALB  2.4* 2. 9*  2.3*   K  3.3*  3.6  3.7   NA  139  137  137   CL  105  103  104   CO2  26  25  26   GLU  163*  131*  63*        Images:    XR (Most Recent). CXR reviewed by me and compared with previous CXR   Results from Hospital Encounter encounter on 04/20/17   XR CHEST PORT   Narrative CHEST    CPT CODE: 31420    HISTORY: Pneumonia, effusion. FINDINGS: Single AP portable view of chest at 0451 demonstrates a right arm PICC  with tip at the lateral rib margin, probable distal subclavian vein. There is  been some interval decrease in right base hazy opacity, possible small  improvement in pleural effusion. There is mild elevation right hemidiaphragm. The left lung is grossly clear. Mediastinum is not widened but the heart may be  at the upper limits of normal in size. The bones and soft tissues are  unremarkable except for sternal wires from prior median sternotomy. There is no  other change from 14 May 2017. Impression IMPRESSION:    Some improved right lung aeration, possible decreased right pleural  effusion/infiltrates. Left lung grossly clear. CT (Most Recent)   Results from Hospital Encounter encounter on 04/20/17   CT ABD PELV WO CONT   Narrative CT abdomen and pelvis without IV or oral contrast    INDICATION: Evaluate fluid collection and new left upper quadrant pain. Comparison May 4, 2017    All CT scans at this facility are performed using dose optimization technique as  appropriate to a performed exam, to include automated exposure control,  adjustment of the mA and/or kV according to patient size (including appropriate  matching for site specific examination) or use of iterative reconstruction  technique. More consolidation/pneumonia in the right lower lobe. Less severe left lower  lobe pneumonia when compared to prior study. No significant effusion. Cardiomegaly with no pericardial effusion. The right psoas muscle is slightly smaller. Draining catheter is still in  place. No large fluid collection surrounding the catheter. Small residual  collection not completely excluded without IV contrast.    Also slightly smaller presacral fluid. Small abscess or loculation not  completely excluded but no focal collection identified. No new abnormalities in the left upper quadrant. No left upper quadrant  inflammation or free air. Splenomegaly is unchanged. Pancreas, liver, adrenal  glands and kidneys are stable with large left renal cyst. Aorta is normal in  caliber. IVC filter in place. More subcutaneous edema/anasarca along both sides. No discrete fluid collection. No bowel obstruction. Fluid in the colon, mild colitis, diarrheal disease  possible. However, moderate fecal material retention in the rectosigmoid colon  and descending colon noted. Impression IMPRESSION:  1. Smaller right psoas muscle implying smaller intramuscular fluid collection  and less edema/inflammation. Draining catheter is still in place. Limited  evaluation without IV contrast. Slightly smaller presacral fluid with no  discrete collection. 2. Less severe left lower lobe pulmonary consolidation but more severe right  lower lobe consolidation. No pleural effusion. 3. Worsening anasarca/subcutaneous edema on both sides. 4. More fluid in the ascending and transverse colon, colitis or diarrheal  disease? EKG Results for orders placed or performed in visit on 11/01/16   AMB POC EKG ROUTINE W/ 12 LEADS, INTER & REP     Status: None    Narrative    AV sequentially paced rhythm with a rate of 67. I have personally reviewed the old medical records and patient's labs    Plan / Recommendation:      1. Acute/crf stage 3.neurogenic bladder. sepsis. improving. nurses are doing int cath   2.anemia,was transfused  3. infected psoas muscle hematoma,s/p drain placement,  3.uti,pneumonia  4.adjusted meds for renal failure    D/w Dr. Tiffany Lutz MD  Nephrology  5/18/2017

## 2017-05-18 NOTE — PROGRESS NOTES
[facility]  Pulmonary, Critical Care, and Sleep Medicine    Name: Sandra Michael MRN: [id number]   : 1950 Hospital: [address] [place] [address]   Date: 2017        Subjective/Interval History:     Patient is a 77 y.o. female w/hx of HTN, CHF, heart block s/p pacer, multiple vascular procedures in neck veins with residual stenoses, prior Hx of DVT, DM, Afib, recent admission for Rt psoas hematoma. Was readmitted on 17 for acute paraplegia, fevers, sweats. Repeat CT demonstrated increased size of psoas fluid collection. Labs notable for leukocytosis and LISEBTH. Pt brought to IR and 300ml of pus aspirated. Hypotensive upon return to floor, and still so after ~1500 ml IVF. Pt was transferred to ICU on 17 for hypotension s/p procedure; recovered with pressor support and fluids. Pt was then transferred out of ICU floor. Last seen by Pulmonary Tigre Villafuerte - 17: Pulmonary consult team re-consulted for SOB x2-3 days without clear cause - cardiac enzymes (-);  VQ scan - low probability for PE; PVL's (+) DVT in R Common Femoral Vein & L popliteal vein. Pt then underwent emergent IVC placement d/t inability to anticoagulate using heparin 2/2 hx Psoas muscle hematoma. 05/15/17: Transferred to ICU for altered mental status & Hypotension    17:   - Pt transferred back to floor overnight from ICU  - Alert, awake, resting comfortably on 2LPM NC with family at bedside  - SOB still present but states that it feels improved; still c/o some chest tightness with her breathing treatments - improving  - Overall, appears improved  - Still not eating great, but improving PO intake  - Denies any substernal CP, palpitations, abd pain, N/V/D, F/C, H/A    ROS:Pertinent items are noted in HPI.     Objective:   Vital Signs:    Visit Vitals    /78 (BP 1 Location: Left arm, BP Patient Position: At rest)    Pulse 82    Temp 98.2 °F (36.8 °C)    Resp 18    Ht 5' 7\" (1.702 m)  Wt 117 kg (258 lb)    SpO2 98%    Breastfeeding No    BMI 40.41 kg/m2       O2 Device: Nasal cannula   O2 Flow Rate (L/min): 2.5 l/min   Temp (24hrs), Av.3 °F (36.8 °C), Min:96.9 °F (36.1 °C), Max:99 °F (37.2 °C)       Intake/Output:   Last shift:      701 - 1900  In: 600 [P.O.:600]  Out: 650 [Urine:650]  Last 3 shifts: 1901 -  0700  In: 1338.8 [I.V.:1004.2]  Out: 2120 [Urine:2100; Drains:20]    Intake/Output Summary (Last 24 hours) at 17 1457  Last data filed at 17 1415   Gross per 24 hour   Intake              660 ml   Output             1260 ml   Net             -600 ml        Physical Exam:    General: Alert, awake, resting comfortably on 2LPM NC   HEENT: Anicteric sclerae; pink palpebral conjunctivae; mucosa moist   Resp: Symmetrical chest rise; mod AE bilat; diminished bibasilar; CTAB; no wheezes/rhonchi/rales noted. CV: RRR, S1S2 normal; No m/r/g   Abdomen: Soft, non-tender; (+) B. S x4; No organomegaly   Extremity: +1 BLE edema; No cyanosis/clubbing noted; peripheral pulses 2+ and symmetric    Neuro: Alert, non-focal   Skin: Warm, dry; No rashes/lesions noted. PICC L upper arm. DATA:  Labs:  Recent Labs      17   0142  17   WBC  9.6  9.4  10.5   HGB  7.7*  6.6*  7.1*   HCT  23.7*  20.5*  22.0*   PLT  263  217  254     Recent Labs      17   04117   0142  17   NA  139  137  137   K  3.3*  3.6  3.7   CL  105  103  104   CO2  26  25  26   GLU  163*  131*  63*   BUN  23*  22*  28*   CREA  2.15*  2.50*  2.88*   CA  8.7  8.7  8.8   MG  1.9  2.0  2.0   ALB  2.4*  2.9*  2.3*   SGOT  22  30  50*   ALT  23  29  44       PFT:   N/A                                                   Echo [17]:  SUMMARY:  Left ventricle: Size was at the upper limits of normal. Systolic function  was mildly reduced by visual assessment. Ejection fraction was estimated  to be 45 %.  There was severe hypokinesis of the basal-mid inferoseptal,  apical septal, and apical wall(s). Structure noted in the apex as  mentioned on previous studies. Possible muscle band vs fibrinous mass. Imaging:  No new imaging    CT ABD PELV WO CONT [05/16/17]: IMPRESSION:  1. Smaller right psoas muscle implying smaller intramuscular fluid collection and less edema/inflammation. Draining catheter is still in place. Limited evaluation without IV contrast. Slightly smaller presacral fluid with no discrete collection. 2. Less severe left lower lobe pulmonary consolidation but more severe right lower lobe consolidation. No pleural effusion. 3. Worsening anasarca/subcutaneous edema on both sides. 4. More fluid in the ascending and transverse colon, colitis or diarrheal disease? [x]I have personally reviewed the patients radiographs  [x]Radiographs reviewed with radiologist   [x]No change from prior, tubes and lines in adequate position  []Improved   [x]Worsening    IMPRESSION:   · R sided Pleural effusion - Slightly worse on CT (05/16/17)  · (+) DVT -  R Common Femoral Vein and L popliteal vein; s/p IVC filter. V/Q scan on 5/11/17: low probability. Small PE is possible but doubtful currently and given current clinical status not a candidate for systemic anticoagulation. · Hypoxia d/t atelectasis - Essentially resolved - still on 2LPM NC   · Atypical Chest pain- doubt cardiac or pulmonary etiology. ?? Diaphragmatic irritation from abdominally placed pacemaker? ? Vs GI/ esophageal etiology. - Improving   · Sepsis - Initially 2/2 infected psoas hematoma/epidural abscess (GAS) with neurological compromise- now with GPC in blood and right-sided infiltrate. ? PICC infection, ? UTI, ? HCAP, pacer infection, fungemia. Also has new LUQ pain (?splenic abscess).    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, resulting in spinal cord infarct- stable  · Enterococcus faecalis in urine (gent susceptible)  · Bacteremia   · Infected pacer pocket  · Psoas hematoma  · LISBETH - D/t neurogenic bladder and component of ATN  · NSTEMI   · Hx of CHF and Afib  · Prior complicated pacer placement (apparently upper extremity and central vein stenoses)  · Inability to obtain MRI (pacer)     Patient Active Problem List   Diagnosis Code    Nonischemic cardiomyopathy ([facility] I42.8    History of complete heart block Z86.79    Biventricular implantable cardioverter-defibrillator in situ Z95.810    Left bundle branch block (LBBB) on electrocardiogram I44.7    Type 2 diabetes mellitus with diabetic neuropathy ([facility]) E11.40    Dyslipidemia E78.5    Diabetic neuropathy associated with type 2 diabetes mellitus ([facility] E11.40    Obstructive sleep apnea on CPAP G47.33, Z99.89    AICD generator infection ([facility] T82. 7XXA    Difficult airway for intubation T88. 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 1XXA    Impaired mobility and ADLs Z74.09    History of Coumadin therapy Z79.01    Gout M10.9    Acute paraplegia (HCC) G82.20    Sepsis ([facility] A41.9    Psoas abscess, right ([facility] K68.12    Krueger catheter in place on admission Z96.0    Urinary tract infection due to Enterococcus N39.0, B95.2    Group B streptococcal infection A49.1      PLAN:   · SpO2 goal >92%; titrate supp O2 PRN; currently resting comfortably on 2LPM NC  · Aspiration precautions, HOB >30'  · Will repeat CXR / ABG if respiratory status worsens or mental status changes dramatically  · Pulmonary toileting; encourage ICS; con't EZ Pap with duo-nebs q4 while awake; pt still not taking very deep breaths - con't to encourage pt on deep breathing to help expand lungs. · No indication for steroids at this time  · Afebrile; aleukocytosis; normotensive; will monitor s/s for infection; current ABX: Merrem q12; Diflucan  · Encourage pt to eat; PT/OT  · Will con't to monitor  · DVT/PUD Prophylaxis per primary        Felix Lockett PA-C    [facility] attending:  Patient seen, examined independently. Agree with above documentation  From Amauri Harden PA-C  05/18/17:   - Pt transferred back to floor overnight from ICU  - Alert, awake, resting comfortably on 2LPM NC with family at bedside  - SOB still present but states that it feels improved; still c/o some chest tightness with her breathing treatments - improving  - Overall, appears improved  Impression:  Hypoxic respiratory failure- multifactorial in setting of sepsis, PE, pleural effusions and basal atelectasis. Paraplegia from dural vein thrombosis, pacemaker pocket infection, psoas abscess  Recommendations:  Bronchial hygiene  Low threshold for decompensation: maintain close follow up  Will follow.     Jane Steven MD

## 2017-05-18 NOTE — PROGRESS NOTES
[facility] Hospitalist Group  Progress Note    Patient: Britt Severs Age: 77 y.o. : 1950 MR#: [id number] SSN: [id number]  Date/Time: 2017 3:35 PM    Subjective:     Seen with  @ bedside. No F/C, N/V, CP, SOB, pain c/o. Able to wiggle R toes very weakly. Assessment/Plan:   1. Sepsis due to infected R psoas hematoma/epidural abscess - with neurologic compromise, paraplegia. Is s/p CT-guided drainage catheter, old drain removal on , growing grp B beta-hemo Strep. Continue abx per ID.   2. Paraplegia due to spinal cord infarct/septic thrombophlebitis - minimal movement of R toes. PT/OT efforts to continue. 3. LISBETH with ATN on CKDz stage 3 - due to neurogenic bladder. Intermittent cath. Indices improving. 4. HypoK+ - replete orally. 5. DM - SSI. BSugars controlled. 6. HTN - BPs normalized, previously hypotensive. Will d/c midodrine and follow. 7. Dyslipidemia - statin. 8. Elevated trop I - due to demand ischemia, #1.  9. BLE DVT - s/p IVCFilter placement . No OAC due to #1/hematoma. 10. Severe prt-shelton malnutrition - supplement. 11. Grp B beta-hemo Strep bacteremia - due to #1. Cultures reviewed. Most recent 5/15 blood cx remains NGTD,  with 3/4 bottles CNStaph. 12. Aspiration PNA vs HCAPNA - continue Merrem per ID. 13. Possible candida cystitis - fluconazole. 14. Constipation - enema prn, MiraLax. 15. Infected pacemaker pocket - s/p tx. 12. Xfusing PRBC for anemia - chronically low, Hgb @ 6.6 this AM. Given 1u PRBC, following.      Additional Notes:      Case discussed with:  [x]Patient  [x]Family  []Nursing  []Case Management  DVT Prophylaxis:  []Lovenox  []Hep SQ  [x]SCDs  []Coumadin   []On Heparin gtt    Objective:   VS:   Visit Vitals    /78 (BP 1 Location: Left arm, BP Patient Position: At rest)    Pulse 82    Temp 98.2 °F (36.8 °C)    Resp 18    Ht 5' 7\" (1.702 m)    Wt 117 kg (258 lb)    SpO2 98%    Breastfeeding No    BMI 40.41 kg/m2      Tmax/24hrs: Temp (24hrs), Av.3 °F (36.8 °C), Min:96.9 °F (36.1 °C), Max:99 °F (37.2 °C)      Intake/Output Summary (Last 24 hours) at 17 1330  Last data filed at 17 1028   Gross per 24 hour   Intake              540 ml   Output             1260 ml   Net             -720 ml       General:  Awake, alert, NAD. Cardiovascular:  RRR. Pulmonary:  CTA B ant.   GI:  Soft, NT/ND, NABS. RLQ drain site c/d/i, bulb empty. Extremities:  No CT or edema. Additional:  Weakly wiggles R toes. Labs:    Recent Results (from the past 24 hour(s))   GLUCOSE, POC    Collection Time: 17 12:34 AM   Result Value Ref Range    Glucose (POC) 377 (H) 70 - 110 mg/dL   GLUCOSE, POC    Collection Time: 17  1:01 AM   Result Value Ref Range    Glucose (POC) 203 (H) 70 - 110 mg/dL   CBC WITH AUTOMATED DIFF    Collection Time: 17  4:11 AM   Result Value Ref Range    WBC 9.6 4.6 - 13.2 K/uL    RBC 2.94 (L) 4.20 - 5.30 M/uL    HGB 7.7 (L) 12.0 - 16.0 g/dL    HCT 23.7 (L) 35.0 - 45.0 %    MCV 80.6 74.0 - 97.0 FL    MCH 26.2 24.0 - 34.0 PG    MCHC 32.5 31.0 - 37.0 g/dL    RDW 17.4 (H) 11.6 - 14.5 %    PLATELET 784 044 - 192 K/uL    MPV 8.3 (L) 9.2 - 11.8 FL    NEUTROPHILS 73 40 - 73 %    LYMPHOCYTES 17 (L) 21 - 52 %    MONOCYTES 7 3 - 10 %    EOSINOPHILS 2 0 - 5 %    BASOPHILS 1 0 - 2 %    ABS. NEUTROPHILS 7.0 1.8 - 8.0 K/UL    ABS. LYMPHOCYTES 1.7 0.9 - 3.6 K/UL    ABS. MONOCYTES 0.7 0.05 - 1.2 K/UL    ABS. EOSINOPHILS 0.2 0.0 - 0.4 K/UL    ABS.  BASOPHILS 0.1 0.0 - 0.1 K/UL    DF AUTOMATED     METABOLIC PANEL, BASIC    Collection Time: 17  4:11 AM   Result Value Ref Range    Sodium 139 136 - 145 mmol/L    Potassium 3.3 (L) 3.5 - 5.5 mmol/L    Chloride 105 100 - 108 mmol/L    CO2 26 21 - 32 mmol/L    Anion gap 8 3.0 - 18 mmol/L    Glucose 163 (H) 74 - 99 mg/dL    BUN 23 (H) 7.0 - 18 MG/DL    Creatinine 2.15 (H) 0.6 - 1.3 MG/DL    BUN/Creatinine ratio 11 (L) 12 - 20      GFR est AA 28 (L) >60 ml/min/1.73m2    GFR est non-AA 23 (L) >60 ml/min/1.73m2    Calcium 8.7 8.5 - 10.1 MG/DL   MAGNESIUM    Collection Time: 05/18/17  4:11 AM   Result Value Ref Range    Magnesium 1.9 1.6 - 2.6 mg/dL   HEPATIC FUNCTION PANEL    Collection Time: 05/18/17  4:11 AM   Result Value Ref Range    Protein, total 5.8 (L) 6.4 - 8.2 g/dL    Albumin 2.4 (L) 3.4 - 5.0 g/dL    Globulin 3.4 2.0 - 4.0 g/dL    A-G Ratio 0.7 (L) 0.8 - 1.7      Bilirubin, total 0.6 0.2 - 1.0 MG/DL    Bilirubin, direct 0.2 0.0 - 0.2 MG/DL    Alk.  phosphatase 218 (H) 45 - 117 U/L    AST (SGOT) 22 15 - 37 U/L    ALT (SGPT) 23 13 - 56 U/L   GLUCOSE, POC    Collection Time: 05/18/17  8:11 AM   Result Value Ref Range    Glucose (POC) 171 (H) 70 - 110 mg/dL   GLUCOSE, POC    Collection Time: 05/18/17  1:09 PM   Result Value Ref Range    Glucose (POC) 158 (H) 70 - 110 mg/dL       Signed By: Michelle Whiteside MD     May 18, 2017 3:35 PM

## 2017-05-19 LAB
ALBUMIN SERPL BCP-MCNC: 2.2 G/DL (ref 3.4–5)
ALBUMIN/GLOB SERPL: 0.6 {RATIO} (ref 0.8–1.7)
ALP SERPL-CCNC: 211 U/L (ref 45–117)
ALT SERPL-CCNC: 27 U/L (ref 13–56)
ANION GAP BLD CALC-SCNC: 6 MMOL/L (ref 3–18)
AST SERPL W P-5'-P-CCNC: 50 U/L (ref 15–37)
BACTERIA SPEC CULT: NORMAL
BASOPHILS # BLD AUTO: 0.1 K/UL (ref 0–0.1)
BASOPHILS # BLD: 1 % (ref 0–2)
BILIRUB DIRECT SERPL-MCNC: <0.1 MG/DL (ref 0–0.2)
BILIRUB SERPL-MCNC: 0.7 MG/DL (ref 0.2–1)
BUN SERPL-MCNC: 22 MG/DL (ref 7–18)
BUN/CREAT SERPL: 13 (ref 12–20)
CALCIUM SERPL-MCNC: 8.5 MG/DL (ref 8.5–10.1)
CHLORIDE SERPL-SCNC: 105 MMOL/L (ref 100–108)
CO2 SERPL-SCNC: 28 MMOL/L (ref 21–32)
CREAT SERPL-MCNC: 1.72 MG/DL (ref 0.6–1.3)
DIFFERENTIAL METHOD BLD: ABNORMAL
EOSINOPHIL # BLD: 0.3 K/UL (ref 0–0.4)
EOSINOPHIL NFR BLD: 3 % (ref 0–5)
ERYTHROCYTE [DISTWIDTH] IN BLOOD BY AUTOMATED COUNT: 17.6 % (ref 11.6–14.5)
GLOBULIN SER CALC-MCNC: 3.7 G/DL (ref 2–4)
GLUCOSE BLD STRIP.AUTO-MCNC: 123 MG/DL (ref 70–110)
GLUCOSE BLD STRIP.AUTO-MCNC: 129 MG/DL (ref 70–110)
GLUCOSE BLD STRIP.AUTO-MCNC: 132 MG/DL (ref 70–110)
GLUCOSE BLD STRIP.AUTO-MCNC: 170 MG/DL (ref 70–110)
GLUCOSE SERPL-MCNC: 100 MG/DL (ref 74–99)
HCT VFR BLD AUTO: 25 % (ref 35–45)
HGB BLD-MCNC: 8.1 G/DL (ref 12–16)
LYMPHOCYTES # BLD AUTO: 19 % (ref 21–52)
LYMPHOCYTES # BLD: 2.1 K/UL (ref 0.9–3.6)
MAGNESIUM SERPL-MCNC: 1.7 MG/DL (ref 1.6–2.6)
MCH RBC QN AUTO: 26 PG (ref 24–34)
MCHC RBC AUTO-ENTMCNC: 32.4 G/DL (ref 31–37)
MCV RBC AUTO: 80.4 FL (ref 74–97)
MONOCYTES # BLD: 0.9 K/UL (ref 0.05–1.2)
MONOCYTES NFR BLD AUTO: 9 % (ref 3–10)
NEUTS SEG # BLD: 7.7 K/UL (ref 1.8–8)
NEUTS SEG NFR BLD AUTO: 68 % (ref 40–73)
PLATELET # BLD AUTO: 246 K/UL (ref 135–420)
PMV BLD AUTO: 8.8 FL (ref 9.2–11.8)
POTASSIUM SERPL-SCNC: 3.8 MMOL/L (ref 3.5–5.5)
PROT SERPL-MCNC: 5.9 G/DL (ref 6.4–8.2)
RBC # BLD AUTO: 3.11 M/UL (ref 4.2–5.3)
SERVICE CMNT-IMP: NORMAL
SODIUM SERPL-SCNC: 139 MMOL/L (ref 136–145)
WBC # BLD AUTO: 11.1 K/UL (ref 4.6–13.2)

## 2017-05-19 PROCEDURE — 74011250637 HC RX REV CODE- 250/637: Performed by: INTERNAL MEDICINE

## 2017-05-19 PROCEDURE — 74011636637 HC RX REV CODE- 636/637: Performed by: INTERNAL MEDICINE

## 2017-05-19 PROCEDURE — 82962 GLUCOSE BLOOD TEST: CPT

## 2017-05-19 PROCEDURE — 74011000258 HC RX REV CODE- 258: Performed by: INTERNAL MEDICINE

## 2017-05-19 PROCEDURE — 94640 AIRWAY INHALATION TREATMENT: CPT

## 2017-05-19 PROCEDURE — 80076 HEPATIC FUNCTION PANEL: CPT | Performed by: NURSE PRACTITIONER

## 2017-05-19 PROCEDURE — 85025 COMPLETE CBC W/AUTO DIFF WBC: CPT | Performed by: NURSE PRACTITIONER

## 2017-05-19 PROCEDURE — 77010033678 HC OXYGEN DAILY: Performed by: HOSPITALIST

## 2017-05-19 PROCEDURE — 65270000029 HC RM PRIVATE

## 2017-05-19 PROCEDURE — 74011000250 HC RX REV CODE- 250: Performed by: UROLOGY

## 2017-05-19 PROCEDURE — 97530 THERAPEUTIC ACTIVITIES: CPT

## 2017-05-19 PROCEDURE — 80048 BASIC METABOLIC PNL TOTAL CA: CPT | Performed by: NURSE PRACTITIONER

## 2017-05-19 PROCEDURE — 65270000032 HC RM SEMIPRIVATE

## 2017-05-19 PROCEDURE — 36592 COLLECT BLOOD FROM PICC: CPT

## 2017-05-19 PROCEDURE — 83735 ASSAY OF MAGNESIUM: CPT | Performed by: NURSE PRACTITIONER

## 2017-05-19 PROCEDURE — 74011250637 HC RX REV CODE- 250/637: Performed by: HOSPITALIST

## 2017-05-19 PROCEDURE — 74011636637 HC RX REV CODE- 636/637: Performed by: FAMILY MEDICINE

## 2017-05-19 PROCEDURE — 74011250636 HC RX REV CODE- 250/636: Performed by: INTERNAL MEDICINE

## 2017-05-19 RX ADMIN — ACETAMINOPHEN 650 MG: 500 TABLET ORAL at 16:01

## 2017-05-19 RX ADMIN — Medication 10 ML: at 13:45

## 2017-05-19 RX ADMIN — CHOLECALCIFEROL TAB 25 MCG (1000 UNIT) 2000 UNITS: 25 TAB at 09:23

## 2017-05-19 RX ADMIN — FAMOTIDINE 20 MG: 20 TABLET ORAL at 09:46

## 2017-05-19 RX ADMIN — FLUCONAZOLE 200 MG: 2 INJECTION INTRAVENOUS at 09:45

## 2017-05-19 RX ADMIN — LACTOBACILLUS TAB 1 TABLET: TAB at 09:23

## 2017-05-19 RX ADMIN — Medication 10 ML: at 06:25

## 2017-05-19 RX ADMIN — MIDODRINE HYDROCHLORIDE 10 MG: 2.5 TABLET ORAL at 17:57

## 2017-05-19 RX ADMIN — MEROPENEM 1 G: 1 INJECTION, POWDER, FOR SOLUTION INTRAVENOUS at 09:46

## 2017-05-19 RX ADMIN — ALLOPURINOL: 100 TABLET ORAL at 09:24

## 2017-05-19 RX ADMIN — IPRATROPIUM BROMIDE AND ALBUTEROL SULFATE 3 ML: 2.5; .5 SOLUTION RESPIRATORY (INHALATION) at 20:43

## 2017-05-19 RX ADMIN — INSULIN GLARGINE 7 UNITS: 100 INJECTION, SOLUTION SUBCUTANEOUS at 09:47

## 2017-05-19 RX ADMIN — LACTOBACILLUS TAB 1 TABLET: TAB at 17:57

## 2017-05-19 RX ADMIN — DOCUSATE SODIUM 100 MG: 100 CAPSULE, LIQUID FILLED ORAL at 09:24

## 2017-05-19 RX ADMIN — POLYETHYLENE GLYCOL 3350 17 G: 17 POWDER, FOR SOLUTION ORAL at 09:49

## 2017-05-19 RX ADMIN — IPRATROPIUM BROMIDE AND ALBUTEROL SULFATE 3 ML: 2.5; .5 SOLUTION RESPIRATORY (INHALATION) at 12:34

## 2017-05-19 RX ADMIN — FOLIC ACID 1 MG: 1 TABLET ORAL at 09:23

## 2017-05-19 RX ADMIN — MIDODRINE HYDROCHLORIDE 10 MG: 2.5 TABLET ORAL at 09:24

## 2017-05-19 RX ADMIN — IPRATROPIUM BROMIDE AND ALBUTEROL SULFATE 3 ML: 2.5; .5 SOLUTION RESPIRATORY (INHALATION) at 08:37

## 2017-05-19 RX ADMIN — MIDODRINE HYDROCHLORIDE 10 MG: 2.5 TABLET ORAL at 00:32

## 2017-05-19 RX ADMIN — IPRATROPIUM BROMIDE AND ALBUTEROL SULFATE 3 ML: 2.5; .5 SOLUTION RESPIRATORY (INHALATION) at 15:16

## 2017-05-19 RX ADMIN — Medication 10 ML: at 00:30

## 2017-05-19 RX ADMIN — INSULIN LISPRO 3 UNITS: 100 INJECTION, SOLUTION INTRAVENOUS; SUBCUTANEOUS at 17:55

## 2017-05-19 NOTE — ROUTINE PROCESS
Bedside and Verbal shift change report given to Anup RUBIN (oncoming nurse) by Emily Avendano RN (offgoing nurse). Report included the following information SBAR, Kardex, MAR and Recent Results. SITUATION:    Code Status: Full Code   Reason for Admission: Acute paraplegia   Acute paraplegia [facility] day: 34   Problem List:       Hospital Problems  Date Reviewed: 4/20/2017          Codes Class Noted POA    * (Principal)Acute paraplegia ([facility] ICD-10-CM: G82.20  ICD-9-CM: 344.1  4/20/2017 Yes        Sepsis ([facility] ICD-10-CM: A41.9  ICD-9-CM: 038.9, 995.91  4/20/2017 Yes        Psoas abscess, right ([facility] ICD-10-CM: Z37.38  ICD-9-CM: 567.31  4/20/2017 Yes        Krueger catheter in place on admission ICD-10-CM: Z96.0  ICD-9-CM: V45.89  4/20/2017 Yes        Urinary tract infection due to Enterococcus ICD-10-CM: N39.0, B95.2  ICD-9-CM: 599.0, 041.04  4/20/2017 Yes        Group B streptococcal infection ICD-10-CM: A49.1  ICD-9-CM: 041.02  4/20/2017 Yes        History of Coumadin therapy ICD-10-CM: Z79.01  ICD-9-CM: V58.61  Unknown Yes    Overview Signed 4/6/2017 10:35 PM by Yoel Tomlinson MD     Anticoagulation for chronic atrial fibrillation; Discontinued on 3/30/2017             Decreased calculated glomerular filtration rate (GFR) ICD-10-CM: R94.4  ICD-9-CM: 794.4  3/30/2017 Yes    Overview Signed 4/6/2017  5:47 PM by Yoel Tomlinson MD     Calculated GFR equivalent to that of CKD stage 3 = 30-59 ml/min             Iliopsoas muscle hematoma ICD-10-CM: S70.10XA  ICD-9-CM: 924.00  3/30/2017 Yes        Psoas hematoma, right, secondary to anticoagulant therapy ICD-10-CM: S30. 1XXA  ICD-9-CM: 924.9, E934.2  3/30/2017 Yes        Impaired mobility and ADLs ICD-10-CM: Z74.09  ICD-9-CM: 799.89  3/30/2017 Yes        Benign hypertensive heart disease with systolic CHF, NYHA class 2 (HCC) (Chronic) ICD-10-CM: I11.0, I50.20  ICD-9-CM: 402.11, 428.20, 428.0  9/5/2012 Yes        AICD generator infection ([facility] ICD-10-CM: T82. 7XXA  ICD-9-CM: 996.61  8/20/2012 Yes    Overview Signed 8/20/2012  6:13 PM by Lea Bosch MD     S/p explant 4 leads 8/20/12             Type 2 diabetes mellitus with diabetic neuropathy (HCC) (Chronic) ICD-10-CM: E11.40  ICD-9-CM: 250.60, 357.2  6/28/2011 Yes              BACKGROUND:    Past Medical History:   Past Medical History:   Diagnosis Date    Acute paraplegia ([facility] 4/20/2017    Benign hypertensive heart disease with systolic CHF, NYHA class 2 ([place] [facility] 9/5/2012    Biventricular implantable cardioverter-defibrillator in situ 04/28/2005    Upgraded to BiV AICD; gen change 4/2008; pocket revision 10/2009; Abdominal - done on 8/22/2012 by Dr. Turner Martin Cardiac cath 08/15/1996    Patent coronaries. Elev LVEDP. EF 50-55%.  Cardiac echocardiogram 06/23/2015    Ltd study. EF 45-50%. Mild, diffuse hypk. Severe apical hypk. No mass or thrombus was clearly identified, although imaging was suboptimal.      Cardiac nuclear imaging test 06/19/2015    Fixed distal apical, distal septal defect more likely due to RV pacing than prior infarct. No ischemia. EF 46%. RWMA c/w RV pacing. Nondiagnostic EKG on pharm stress test.      Cardiovascular lower extremity venous duplex 09/04/2012    Acute, non-occlusive DVT in CFV on right. No DVT on left. No superficial thrombosis bilaterally.  Cardiovascular upper extremity venous duplex 08/27/2012    DVT in axillary vein on left. Left subclavian was not visualized.     Chronic anemia 9/5/2012    Chronic systolic heart failure (HCC)     Decreased calculated glomerular filtration rate (GFR) 3/30/2017    Calculated GFR equivalent to that of CKD stage 3 = 30-59 ml/min    Diabetic neuropathy associated with type 2 diabetes mellitus ([facility] 6/28/2011    Difficult airway for intubation 08/22/2012    see anesthesia airway note    Dyslipidemia 6/28/2011    Gout     History of complete heart block 6/28/2011    History of Coumadin therapy Anticoagulation for DVT of the LUE; Discontinued on 3/30/2017    History of deep venous thrombosis 9/5/2012    Left upper extremity    History of pyelonephritis 3/30/2017    Left bundle branch block (LBBB) on electrocardiogram 6/28/2011    Nonischemic cardiomyopathy ([facility] 6/28/2011    Obesity (BMI 35.0-39.9 without comorbidity) ([facility] 3/13/2017    Obstructive sleep apnea on CPAP 2/7/2012    Psoas abscess, right ([facility] 4/20/2017    Psoas hematoma, right, secondary to anticoagulant therapy 3/30/2017    Type 2 diabetes mellitus with diabetic neuropathy ([facility] 6/28/2011         Patient taking anticoagulants no     ASSESSMENT:    Changes in Assessment Throughout Shift: none     Patient has Central Line: yes Reasons if yes: antibiotics   Patient has Krueger Cath: yes Reasons if yes: limited movement, gluteal fold wound      Last Vitals:     Vitals:    05/18/17 2105 05/18/17 2359 05/19/17 0411 05/19/17 0837   BP:  140/64 154/74    Pulse:  64 74    Resp:  18 18    Temp:  99.5 °F (37.5 °C) 98.6 °F (37 °C)    TempSrc:       SpO2: 97% 99% 94% 98%   Weight:       Height:            IV and DRAINS (will only show if present)   Drain 8.5 FR Right psoas muscle drain 05/10/17 Right Other (comment)-Site Assessment: Clean, dry, & intact  [REMOVED] Drain 04/20/17 Right Other (comment)-Site Assessment: Clean, dry, & intact  [REMOVED] Peripheral IV 04/20/17 Left Arm-Site Assessment: Clean, dry, & intact  [REMOVED] Peripheral IV 04/20/17 Right Antecubital-Site Assessment: Clean, dry, & intact  [REMOVED] Triple Lumen Triple Lumen CVL 04/20/17 Left Other(comment)-Site Assessment: Clean, dry, & intact  [REMOVED] Peripheral IV 04/21/17 Left Arm-Site Assessment: Clean, dry, & intact  [REMOVED] Peripheral IV 04/25/17 Left Forearm-Site Assessment: Clean, dry, & intact  PICC Double Lumen 89/17/56 Right;Basilic-Site Assessment: Clean, dry, & intact     WOUND (if present)   Wound Type:  Gluteal fold   Dressing present Dressing Present : Changed   Wound Concerns/Notes:  none     PAIN    Pain Assessment    Pain Intensity 1: 0 (05/19/17 0007)    Pain Location 1: Groin    Pain Intervention(s) 1: Rest    Patient Stated Pain Goal: 0  o Interventions for Pain:  none  o Intervention effective: no  o Time of last intervention: none givne   o Reassessment Completed: none given      Last 3 Weights:  Last 3 Recorded Weights in this Encounter    05/16/17 0100 05/17/17 0300 05/18/17 0737   Weight: 113.3 kg (249 lb 12.5 oz) 117.4 kg (258 lb 12.8 oz) 117 kg (258 lb)     Weight change:      INTAKE/OUPUT    Current Shift:      Last three shifts: 05/17 1901 - 05/19 0700  In: 760 [P.O.:600; I.V.:150]  Out: 660 [Urine:650; Drains:10]     LAB RESULTS     Recent Labs      05/19/17   0335  05/18/17   0411  05/17/17   0142   WBC  11.1  9.6  9.4   HGB  8.1*  7.7*  6.6*   HCT  25.0*  23.7*  20.5*   PLT  246  263  217        Recent Labs      05/19/17   0335  05/18/17   0411  05/17/17   0142   NA  139  139  137   K  3.8  3.3*  3.6   GLU  100*  163*  131*   BUN  22*  23*  22*   CREA  1.72*  2.15*  2.50*   CA  8.5  8.7  8.7   MG  1.7  1.9  2.0       RECOMMENDATIONS AND DISCHARGE PLANNING     1. Pending tests/procedures/ Plan of Care or Other Needs: PT/OT     2. Discharge plan for patient and Needs/Barriers: acute paraplegia, better in upper ext    3. Estimated Discharge Date: 05/26/17 Posted on Whiteboard in cht Room: yes      4. The patient's care plan was reviewed with the oncoming nurse. \"HEALS\" SAFETY CHECK      Fall Risk    Total Score: 3    Safety Measures: Safety Measures: Bed/Chair-Wheels locked, Bed in low position, Caregiver at bedside, Call light within reach    A safety check occurred in the patient's room between off going nurse and oncoming nurse listed above.     The safety check included the below items  Area Items   H  High Alert Medications - Verify all high alert medication drips (heparin, PCA, etc.)   E  Equipment - Suction is set up for ALL patients (with yanker)  - Red plugs utilized for all equipment (IV pumps, etc.)  - WOWs wiped down at end of shift.  - Room stocked with oxygen, suction, and other unit-specific supplies   A  Alarms - Bed alarm is set for fall risk patients  - Ensure chair alarm is in place and activated if patient is up in a chair   L  Lines - Check IV for any infiltration  - Krueger bag is empty if patient has a Krueger   - Tubing and IV bags are labeled   S  Safety   - Room is clean, patient is clean, and equipment is clean. - Hallways are clear from equipment besides carts. - Fall bracelet on for fall risk patients  - Ensure room is clear and free of clutter  - Suction is set up for ALL patients (with vania)  - Hallways are clear from equipment besides carts.    - Isolation precautions followed, supplies available outside room, sign posted     Herbert Magaña RN

## 2017-05-19 NOTE — PROGRESS NOTES
[facility] Hospitalist Group  Progress Note    Patient: Solo Arora Age: 77 y.o. : 1950 MR#: [id number] SSN: [id number]  Date/Time: 2017 3:35 PM    Subjective:     Denies F/C, N/V, CP, SOB, abd pain. Mild cough, nonproductive. No dysuria. Assessment/Plan:   1. Sepsis due to infected R psoas hematoma/epidural abscess - with neurologic compromise, paraplegia. Is s/p CT-guided drainage catheter, old drain removal on , growing grp B beta-hemo Strep. Continue abx per ID: Merrem through  for PNA as well as ceftriaxone on completion of Merrem course. IR to remove drain next week possibly. 2. 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 PT/OT efforts to continue. 3. LISBETH with ATN on CKDz stage 3 - due to neurogenic bladder. Intermittent cath. Indices continue to improve. 4. HypoK+ - repleted. 5. DM - SSI. BSugars controlled. 6. HTN - BPs normalized, previously hypotensive. Have discontinued midodrine. Following. 7. Dyslipidemia - statin. 8. Elevated trop I - due to demand ischemia, #1.  9. BLE DVT - s/p IVCFilter placement . No OAC due to #1/hematoma. 10. Severe prt-shelton malnutrition - supplement. 11. Grp B beta-hemo Strep bacteremia - due to #1. Cultures reviewed. Most recent 5/15 blood cx remains NGTD,  with 3/4 bottles Staph epi. 12. Aspiration PNA vs HCAPNA - continue Merrem per ID, through . 13. Possible candida cystitis - fluconazole. 14. Constipation - enema prn, MiraLax. 15. Infected pacemaker pocket - s/p tx. 12. Xfusing PRBC for anemia - chronically low, Hgb @ 6.6 on . Given 1u PRBC. H/H stable, following.      Additional Notes:      Case discussed with:  [x]Patient  []Family  []Nursing  []Case Management  DVT Prophylaxis:  []Lovenox  []Hep SQ  [x]SCDs  []Coumadin   []On Heparin gtt    Objective:   VS:   Visit Vitals    /70 (BP 1 Location: Left arm, BP Patient Position: At rest)    Pulse 87    Temp 98 °F (36.7 °C)    Resp 18    Ht 5' 7\" (1.702 m)    Wt 115.6 kg (254 lb 13.6 oz)    SpO2 94%    Breastfeeding No    BMI 39.92 kg/m2      Tmax/24hrs: Temp (24hrs), Av.3 °F (36.8 °C), Min:97.6 °F (36.4 °C), Max:99.5 °F (37.5 °C)      Intake/Output Summary (Last 24 hours) at 17 1234  Last data filed at 17 0615   Gross per 24 hour   Intake              220 ml   Output              625 ml   Net             -405 ml       General:  Awake, alert, NAD. Cardiovascular:  RRR. Pulmonary:  CTA B ant.   GI:  Soft, NT/ND, NABS. RLQ drain site c/d/i, bulb empty. Extremities:  No CT or edema. Additional:  Weakly wiggles R toes. Labs:    Recent Results (from the past 24 hour(s))   GLUCOSE, POC    Collection Time: 17  1:09 PM   Result Value Ref Range    Glucose (POC) 158 (H) 70 - 110 mg/dL   GLUCOSE, POC    Collection Time: 17  4:13 PM   Result Value Ref Range    Glucose (POC) 207 (H) 70 - 110 mg/dL   GLUCOSE, POC    Collection Time: 17 10:47 PM   Result Value Ref Range    Glucose (POC) 119 (H) 70 - 110 mg/dL   CBC WITH AUTOMATED DIFF    Collection Time: 17  3:35 AM   Result Value Ref Range    WBC 11.1 4.6 - 13.2 K/uL    RBC 3.11 (L) 4.20 - 5.30 M/uL    HGB 8.1 (L) 12.0 - 16.0 g/dL    HCT 25.0 (L) 35.0 - 45.0 %    MCV 80.4 74.0 - 97.0 FL    MCH 26.0 24.0 - 34.0 PG    MCHC 32.4 31.0 - 37.0 g/dL    RDW 17.6 (H) 11.6 - 14.5 %    PLATELET 486 406 - 597 K/uL    MPV 8.8 (L) 9.2 - 11.8 FL    NEUTROPHILS 68 40 - 73 %    LYMPHOCYTES 19 (L) 21 - 52 %    MONOCYTES 9 3 - 10 %    EOSINOPHILS 3 0 - 5 %    BASOPHILS 1 0 - 2 %    ABS. NEUTROPHILS 7.7 1.8 - 8.0 K/UL    ABS. LYMPHOCYTES 2.1 0.9 - 3.6 K/UL    ABS. MONOCYTES 0.9 0.05 - 1.2 K/UL    ABS. EOSINOPHILS 0.3 0.0 - 0.4 K/UL    ABS.  BASOPHILS 0.1 0.0 - 0.1 K/UL    DF AUTOMATED     METABOLIC PANEL, BASIC    Collection Time: 17  3:35 AM   Result Value Ref Range    Sodium 139 136 - 145 mmol/L    Potassium 3.8 3.5 - 5.5 mmol/L    Chloride 105 100 - 108 mmol/L    CO2 28 21 - 32 mmol/L    Anion gap 6 3.0 - 18 mmol/L    Glucose 100 (H) 74 - 99 mg/dL    BUN 22 (H) 7.0 - 18 MG/DL    Creatinine 1.72 (H) 0.6 - 1.3 MG/DL    BUN/Creatinine ratio 13 12 - 20      GFR est AA 36 (L) >60 ml/min/1.73m2    GFR est non-AA 30 (L) >60 ml/min/1.73m2    Calcium 8.5 8.5 - 10.1 MG/DL   MAGNESIUM    Collection Time: 05/19/17  3:35 AM   Result Value Ref Range    Magnesium 1.7 1.6 - 2.6 mg/dL   HEPATIC FUNCTION PANEL    Collection Time: 05/19/17  3:35 AM   Result Value Ref Range    Protein, total 5.9 (L) 6.4 - 8.2 g/dL    Albumin 2.2 (L) 3.4 - 5.0 g/dL    Globulin 3.7 2.0 - 4.0 g/dL    A-G Ratio 0.6 (L) 0.8 - 1.7      Bilirubin, total 0.7 0.2 - 1.0 MG/DL    Bilirubin, direct <0.1 0.0 - 0.2 MG/DL    Alk.  phosphatase 211 (H) 45 - 117 U/L    AST (SGOT) 50 (H) 15 - 37 U/L    ALT (SGPT) 27 13 - 56 U/L   GLUCOSE, POC    Collection Time: 05/19/17  6:08 AM   Result Value Ref Range    Glucose (POC) 123 (H) 70 - 110 mg/dL   GLUCOSE, POC    Collection Time: 05/19/17  9:05 AM   Result Value Ref Range    Glucose (POC) 132 (H) 70 - 110 mg/dL   GLUCOSE, POC    Collection Time: 05/19/17 11:50 AM   Result Value Ref Range    Glucose (POC) 129 (H) 70 - 110 mg/dL       Signed By: Natalie Morton MD     May 19, 2017 3:35 PM

## 2017-05-19 NOTE — PROGRESS NOTES
Progress Note    Patient: Pamela Hayes MRN: [id number]  SSN: [id number]    YOB: 1950  Age: 77 y.o. Sex: female      Admit Date: 4/20/2017    LOS: 29 days     Subjective:     FEELS BETTER  NO N/V  TEMP NORMAL  NO  DRAINAGE        Objective:     Vitals:    05/18/17 2000 05/18/17 2105 05/18/17 2359 05/19/17 0411   BP: 140/83  140/64 154/74   Pulse: 80  64 74   Resp: 18 18 18   Temp: 97.6 °F (36.4 °C)  99.5 °F (37.5 °C) 98.6 °F (37 °C)   TempSrc:       SpO2: 95% 97% 99% 94%   Weight:       Height:            Intake and Output:  Current Shift:    Last three shifts: 05/17 1901 - 05/19 0700  In: 760 [P.O.:600;  I.V.:150]  Out: 660 [Urine:650; Drains:10]    Current Facility-Administered Medications   Medication Dose Route Frequency    meropenem (MERREM) 1 g in 0.9% sodium chloride (MBP/ADV) 50 mL MBP  1 g IntraVENous Q12H    insulin glargine (LANTUS) injection 7 Units  7 Units SubCUTAneous DAILY    0.9% sodium chloride infusion 250 mL  250 mL IntraVENous PRN    midodrine (PROAMITINE) tablet 10 mg  10 mg Oral Q8H    fluconazole (DIFLUCAN) 200mg/100 mL IVPB (premix)  200 mg IntraVENous Q24H    insulin lispro (HUMALOG) injection   SubCUTAneous Q6H    acetaminophen (TYLENOL) suppository 650 mg  650 mg Rectal Q6H PRN    acetaminophen (TYLENOL) tablet 500 mg  500 mg Oral Q6H PRN    diclofenac (VOLTAREN) 1 % topical gel 2 g  2 g Topical Q8H PRN    allopurinol (ZYLOPRIM) tablet 50 mg  50 mg Oral DAILY    aluminum-magnesium hydroxide (MAALOX) oral suspension 15 mL  15 mL Oral QID PRN    albuterol-ipratropium (DUO-NEB) 2.5 MG-0.5 MG/3 ML  3 mL Nebulization Q4HWA RT    Lactobacillus Acidoph & Bulgar (FLORANEX) tablet 1 Tab  1 Tab Oral BID    sodium chloride (NS) flush 5-10 mL  5-10 mL IntraVENous Q8H    naloxone (NARCAN) injection 0.1 mg  0.1 mg IntraVENous Multiple    acetaminophen (TYLENOL) tablet 650 mg  650 mg Oral Q4H PRN    famotidine (PEPCID) tablet 20 mg  20 mg Oral DAILY    0.9% sodium chloride infusion 250 mL  250 mL IntraVENous PRN    diphenhydrAMINE (BENADRYL) capsule 50 mg  50 mg Oral Q4H PRN    polyethylene glycol (MIRALAX) packet 17 g  17 g Oral TID    cholecalciferol (VITAMIN D3) tablet 2,000 Units  2,000 Units Oral DAILY    docusate sodium (COLACE) capsule 100 mg  100 mg Oral DAILY AFTER BREAKFAST    oxyCODONE-acetaminophen (PERCOCET 7.5) 7.5-325 mg per tablet 1 Tab  1 Tab Oral Q4H PRN    pravastatin (PRAVACHOL) tablet 40 mg  40 mg Oral QHS    senna-docusate (PERICOLACE) 8.6-50 mg per tablet 2 Tab  2 Tab Oral PCD    folic acid (FOLVITE) tablet 1 mg  1 mg Oral DAILY    ondansetron (ZOFRAN) injection 4 mg  4 mg IntraVENous Q8H PRN    glucose chewable tablet 16 g  16 g Oral PRN    glucagon (GLUCAGEN) injection 1 mg  1 mg IntraMUSCular PRN    dextrose (D50W) injection syrg 12.5-25 g  25-50 mL IntraVENous PRN         Physical Exam:   GENERAL: alert, cooperative, no distress, appears stated age  ABDOMEN: soft, non-tender.  Bowel sounds normal. No masses,  no organomegaly  FLANK: NON  TENDER      Lab/Data Review:  BMP:   Lab Results   Component Value Date/Time     05/19/2017 03:35 AM    K 3.8 05/19/2017 03:35 AM     05/19/2017 03:35 AM    CO2 28 05/19/2017 03:35 AM    AGAP 6 05/19/2017 03:35 AM     (H) 05/19/2017 03:35 AM    BUN 22 (H) 05/19/2017 03:35 AM    CREA 1.72 (H) 05/19/2017 03:35 AM    GFRAA 36 (L) 05/19/2017 03:35 AM    GFRNA 30 (L) 05/19/2017 03:35 AM     CMP:   Lab Results   Component Value Date/Time     05/19/2017 03:35 AM    K 3.8 05/19/2017 03:35 AM     05/19/2017 03:35 AM    CO2 28 05/19/2017 03:35 AM    AGAP 6 05/19/2017 03:35 AM     (H) 05/19/2017 03:35 AM    BUN 22 (H) 05/19/2017 03:35 AM    CREA 1.72 (H) 05/19/2017 03:35 AM    GFRAA 36 (L) 05/19/2017 03:35 AM    GFRNA 30 (L) 05/19/2017 03:35 AM    CA 8.5 05/19/2017 03:35 AM    MG 1.7 05/19/2017 03:35 AM    ALB 2.2 (L) 05/19/2017 03:35 AM    TP 5.9 (L) 05/19/2017 03:35 AM    GLOB 3.7 05/19/2017 03:35 AM    AGRAT 0.6 (L) 05/19/2017 03:35 AM    SGOT 50 (H) 05/19/2017 03:35 AM    ALT 27 05/19/2017 03:35 AM     CBC:   Lab Results   Component Value Date/Time    WBC 11.1 05/19/2017 03:35 AM    HGB 8.1 (L) 05/19/2017 03:35 AM    HCT 25.0 (L) 05/19/2017 03:35 AM     05/19/2017 03:35 AM          CT Results:    Results from Hospital Encounter encounter on 04/20/17   CT ABD PELV WO CONT   Narrative CT abdomen and pelvis without IV or oral contrast    INDICATION: Evaluate fluid collection and new left upper quadrant pain. Comparison May 4, 2017    All CT scans at this facility are performed using dose optimization technique as  appropriate to a performed exam, to include automated exposure control,  adjustment of the mA and/or kV according to patient size (including appropriate  matching for site specific examination) or use of iterative reconstruction  technique. More consolidation/pneumonia in the right lower lobe. Less severe left lower  lobe pneumonia when compared to prior study. No significant effusion. Cardiomegaly with no pericardial effusion. The right psoas muscle is slightly smaller. Draining catheter is still in  place. No large fluid collection surrounding the catheter. Small residual  collection not completely excluded without IV contrast.    Also slightly smaller presacral fluid. Small abscess or loculation not  completely excluded but no focal collection identified. No new abnormalities in the left upper quadrant. No left upper quadrant  inflammation or free air. Splenomegaly is unchanged. Pancreas, liver, adrenal  glands and kidneys are stable with large left renal cyst. Aorta is normal in  caliber. IVC filter in place. More subcutaneous edema/anasarca along both sides. No discrete fluid collection. No bowel obstruction. Fluid in the colon, mild colitis, diarrheal disease  possible.  However, moderate fecal material retention in the rectosigmoid colon  and descending colon noted. Impression IMPRESSION:  1. Smaller right psoas muscle implying smaller intramuscular fluid collection  and less edema/inflammation. Draining catheter is still in place. Limited  evaluation without IV contrast. Slightly smaller presacral fluid with no  discrete collection. 2. Less severe left lower lobe pulmonary consolidation but more severe right  lower lobe consolidation. No pleural effusion. 3. Worsening anasarca/subcutaneous edema on both sides. 4. More fluid in the ascending and transverse colon, colitis or diarrheal  disease? US Results:    Results from [place] encounter on 04/20/17   US CHEST   Narrative Ultrasound of the chest    HISTORY: Pleural effusion. COMPARISON: Chest x-ray May 16, 2017. FINDINGS: Images of the right-sided chest was performed. There is no significant  pleural fluid present for safe thoracentesis. Thoracentesis was not performed. Impression IMPRESSION: Insufficient volume of right pleural effusion for thoracentesis.           Assessment:     Principal Problem:    Acute paraplegia ([address] (4/20/2017)    Active Problems:    Type 2 diabetes mellitus with diabetic neuropathy ([address] (6/28/2011)      AICD generator infection ([address] (8/20/2012)      Overview: S/p explant 4 leads 8/20/12      Benign hypertensive heart disease with systolic CHF, NYHA class 2 ([address] (9/5/2012)      Decreased calculated glomerular filtration rate (GFR) (3/30/2017)      Overview: Calculated GFR equivalent to that of CKD stage 3 = 30-59 ml/min      Iliopsoas muscle hematoma (3/30/2017)      Psoas hematoma, right, secondary to anticoagulant therapy (3/30/2017)      Impaired mobility and ADLs (3/30/2017)      History of Coumadin therapy ()      Overview: Anticoagulation for chronic atrial fibrillation; Discontinued on 3/30/2017      Sepsis ([address] (4/20/2017)      Psoas abscess, right ([address] (4/20/2017)      Krueger catheter in place on admission (4/20/2017)      Urinary tract infection due to Enterococcus (4/20/2017)      Group B streptococcal infection (4/20/2017)        Plan:     IF  GEN SURGERY AGREES,   WOULD  D/C  DRAIN IN RLQ THAT  HAS REMAINED DRY FOR SEVERAL  DAYS        Signed By: Félix Proctor MD , FACS    May 19, 2017      PAGER:  [phone]  CELL:  [place]  OFFICE:  [address] [place] [address] [phone]

## 2017-05-19 NOTE — PROGRESS NOTES
NUTRITION    Nutrition Consult: General Nutrition Management & Supplements      RECOMMENDATIONS / PLAN:     - Change supplements to Ensure Pudding TID   - Continue RD inpatient monitoring and evaluation. NUTRITION INTERVENTIONS & DIAGNOSIS:     [x] Meals/Snacks: modified diet  [x] Medical food supplementation: Magic Cup TID  [x] Vitamin/mineral supplementation: ergocalciferol, folic acid     Nutrition Diagnosis: Inadequate oral intake related to decreased appetite as evidenced by poor meal intake PTA and since admission. ASSESSMENT:     Subjective/Objective: Had poor appetite and meal intake since admission; recently improving. Fair/good meal intake. Does not like Magic Cup supplements. Discussed changing back to Glucerna Shakes; pt declined. Agreeable to try Ensure Pudding.  Encouraged meal and supplement intake    Average po intake adequate to meet patients estimated nutritional needs:   [] Yes     [x] No   [] Unable to determine at this time    Diet: DIET DIABETIC [facility]; 2 HONEY  DIET NUTRITIONAL SUPPLEMENTS All Meals; [facility] 6 Allergies: blueberry   Current Appetite:   [] Good     [x] Fair     [] Poor     [] Other:   Appetite/meal intake prior to admission:   [] Good     [] Fair     [x] Poor   [] Other:  Feeding Limitations:  [x] Swallowing difficulty: SLP following    [] Chewing difficulty    [] Other:   Current Meal Intake:   Patient Vitals for the past 100 hrs:   % Diet Eaten   05/18/17 1415 75 %   05/18/17 1028 100 %   05/18/17 0925 100 %     BM: 5/18  Skin Integrity: callus on right foot; abrasion on right buttocks  Edema:  2+ generalized, LEs, UEs   Pertinent Medications: Reviewed: bowel regimen, zofran, maalox     Recent Labs      05/19/17   0335  05/18/17   0411  05/17/17   0142   NA  139  139  137   K  3.8  3.3*  3.6   CL  105  105  103   CO2  28  26  25   GLU  100*  163*  131*   BUN  22*  23*  22*   CREA  1.72*  2.15*  2.50*   CA  8.5  8.7  8.7   MG  1.7  1.9  2.0 ALB  2.2*  2.4*  2.9*   SGOT  50*  22  30   ALT  27  23  29       Intake/Output Summary (Last 24 hours) at 05/19/17 1600  Last data filed at 05/19/17 0615   Gross per 24 hour   Intake              100 ml   Output              625 ml   Net             -525 ml       Anthropometrics:  Ht Readings from Last 1 Encounters:   05/18/17 5' 7\" (1.702 m)     Last 3 Recorded Weights in this Encounter    05/17/17 0300 05/18/17 0737 05/19/17 0918   Weight: 117.4 kg (258 lb 12.8 oz) 117 kg (258 lb) 115.6 kg (254 lb 13.6 oz)     Body mass index is 39.92 kg/(m^2). Obese, Class III           Weight History: Per chart review, patient reported usual weight of 240 lb       Weight Metrics 5/19/2017 4/20/2017 4/6/2017 4/6/2017 3/30/2017 3/16/2017 3/14/2017   Weight 254 lb 13.6 oz - 233 lb 227 lb 1.6 oz - 240 lb 243 lb   BMI - 39.92 kg/m2 36.49 kg/m2 - 35.57 kg/m2 37.59 kg/m2 38.06 kg/m2        Admitting Diagnosis: Acute paraplegia  Acute paraplegia [facility])  Past Medical History:   Diagnosis Date    Acute paraplegia ([facility] 4/20/2017    Benign hypertensive heart disease with systolic CHF, NYHA class 2 ([facility] 9/5/2012    Biventricular implantable cardioverter-defibrillator in situ 04/28/2005    Upgraded to BiV AICD; gen change 4/2008; pocket revision 10/2009; Abdominal - done on 8/22/2012 by Dr. Teena Morin Cardiac cath 08/15/1996    Patent coronaries. Elev LVEDP. EF 50-55%.  Cardiac echocardiogram 06/23/2015    Ltd study. EF 45-50%. Mild, diffuse hypk. Severe apical hypk. No mass or thrombus was clearly identified, although imaging was suboptimal.      Cardiac nuclear imaging test 06/19/2015    Fixed distal apical, distal septal defect more likely due to RV pacing than prior infarct. No ischemia. EF 46%. RWMA c/w RV pacing. Nondiagnostic EKG on pharm stress test.      Cardiovascular lower extremity venous duplex 09/04/2012    Acute, non-occlusive DVT in CFV on right. No DVT on left.   No superficial thrombosis bilaterally.  Cardiovascular upper extremity venous duplex 08/27/2012    DVT in axillary vein on left. Left subclavian was not visualized.  Chronic anemia 9/5/2012    Chronic systolic heart failure (HCC)     Decreased calculated glomerular filtration rate (GFR) 3/30/2017    Calculated GFR equivalent to that of CKD stage 3 = 30-59 ml/min    Diabetic neuropathy associated with type 2 diabetes mellitus ([facility] 6/28/2011    Difficult airway for intubation 08/22/2012    see anesthesia airway note    Dyslipidemia 6/28/2011    Gout     History of complete heart block 6/28/2011    History of Coumadin therapy     Anticoagulation for DVT of the LUE; Discontinued on 3/30/2017    History of deep venous thrombosis 9/5/2012    Left upper extremity    History of pyelonephritis 3/30/2017    Left bundle branch block (LBBB) on electrocardiogram 6/28/2011    Nonischemic cardiomyopathy ([address] 6/28/2011    Obesity (BMI 35.0-39.9 without comorbidity) ([facility] 3/13/2017    Obstructive sleep apnea on CPAP 2/7/2012    Psoas abscess, right ([address] 4/20/2017    Psoas hematoma, right, secondary to anticoagulant therapy 3/30/2017    Type 2 diabetes mellitus with diabetic neuropathy ([facility] 6/28/2011       Education Needs:        [x] None identified  [] Identified - Not appropriate at this time  []  Identified and addressed - refer to education log  Learning Limitations:   [x] None identified  [] Identified    Cultural, Alevism & ethnic food preferences:  [x] None identified    [] Identified and addressed     ESTIMATED NUTRITION NEEDS:     Calories: 4837-7326 kcal (MSJx1.2-1.3) based on  [x] Actual BW: 111 kg      [] IBW   CHO: 252-273 gm (50% kcal)   Protein:  gm (0.8-1 gm/kg) based on  [x] Actual BW      [] IBW   Fluid: 1 mL/kcal     MONITORING & EVALUATION:     Nutrition Goal(s):   1. Nutritional needs will be met through adequate oral intake or nutrition support within the next 7 days.   Outcome:  [] Met/Ongoing    [x] Not Met: Progressing    [] New/Initial Goal     Monitoring:   [x] Diet tolerance   [x] Meal intake   [x] Supplement intake   [] GI symptoms/ability to tolerate po diet   [] Respiratory status   [] Plan of care      Previous Recommendations (for follow-up assessments only):     [x]   Implemented       []   Not Implemented (RD to address)     [] No Recommendation Made     Discharge Planning: diabetic, cardiac diet, consistency as tolerated per SLP  [x] Participated in care planning, discharge planning, & interdisciplinary rounds as appropriate      Geneva Gerard, [address] [place] [address]    Pager: [phone]

## 2017-05-19 NOTE — PROGRESS NOTES
Infectious Disease Progress Note    Requested by: Dr. Soo Guzman, dr. Amari Hsu    Reason: sepsis, acute paraplegia    Current abx Prior abx   Meropenem since 5/15   Cefepime 4/20-4/21  vancomycin  4/20-4/24  Gentamicin 4/21-4/24  Metronidazole  4/20-4/24  Ceftriaxone  4/24- 5/15  linezolid 5/14-5/17     Lines:   PICC RUE 4/26    Assessment :    77 y.o., right handed female, with an established history of hypertension, complete heart block with permanent pacemaker placed, diabetes mellitus, diabetic peripheral neuropathy, obesity, admitted to [facility] on 4/20/17. Clinical presentation consistent with  Sepsis (POA)  secondary to group B streptococcus bloodstream infection (positive blood cultures 4/20, negative blood cultures 4/21). Most likely source of bloodstream infection is infected right psoas hematoma/abscess. 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 No signs of neurological recovery on today's exam     2 week h/o pain at the site of abdominal pacemaker, tenderness at the site - however, patient doesn't have erythema at the site of pacemaker. No fluid collection noted at pacemaker pocket site per usg 4/24. Quick clearance of bacteremia would argue against endocarditis/endovascular infection. At this time risk of removal of pacemaker/general pocket change exceed the benefit. Discussed with cardiology. Would recommend close monitoring. Abdominal usg 4/24 doesn't reveal evidence of abscess/fluid collection at the site of pacemaker pocket. Enterococcus in urine cultures 4/20 likely colonizer    Acute renal failure: nephrology consult appreciated. Difficult to determine exact etiology of ARF at this time. Slight improvement in creatinine today.      Low grade fever overnight - Ct scan 5/4 reveals increasing focal area within the right psoas muscle inferior to the previously drained area. could represent either spread of infection inferiorly or intramuscular hematoma. S/p ct guided IR drain check - old drain removed. New drain placed inferiorly on 5/10    No evidence of pneumonia    High grade fevers with tm:102 on 5/10, tm:103 on 5/13 - ?due to infected picc line. two of two sets of blood cultures 5/14 positive for coagulase negative staphylococcus. ?right sided aspiration pneumonia - unable to perform thoracentesis since inadequate fluid. One of two sets of blood cultures could be contaminant. Will need to follow repeat blood cultures to determine this. Acute renal failure: multifactorial - nephrology consult appreciated     bilateral LE dvt - s/p IVC filter placement 5/11    No leukocytosis     Resolved fevers. Mid abdominal pain today. No evidence of abdominal wall cellulitis noted on ct scan. Fecal impaction rectosigmoid - could be contributing to abdominal pain. Improved psoas edema/inflammation    Recommendations:    1. cont meropenem for aspiration/hcap till 5/22, cont fluconazole to cover for possible candida cystitis. Will switch back to ceftriaxone to complete treatment for infected psoas abscess once done with meropenem  2. Will d/w dr. Gerri Samuel about psoas drain removal next week if continued improvement  3. Ok to leave picc line for now      Above plan was discussed in details with patient. Will d/w dr. Cristina Sky. Please call me if any further questions or concerns. Will continue to participate in the care of this patient. subjective:    Feels better. Denies cp, sob. Unable to move legs. No nausea, vomiting. No new rash/itching/joint pain/back pain. Home Medication List    Details   gabapentin (NEURONTIN) 400 mg capsule Take 1 Cap by mouth two (2) times a day.  Indications: NEUROPATHIC PAIN  Qty: 30 Cap, Refills: 0    Associated Diagnoses: Iliopsoas muscle hematoma, right, subsequent encounter      cholecalciferol (VITAMIN D3) 1,000 unit tablet Take 2 Tabs by mouth daily. Indications: PREVENTION OF VITAMIN D DEFICIENCY  Qty: 30 Tab, Refills: 0      SITagliptin (JANUVIA) 50 mg tablet Take 50 mg by mouth daily. Indications: type 2 diabetes mellitus      potassium chloride (KLOR-CON M20) 20 mEq tablet Take 20 mEq by mouth two (2) times a day. Indications: HYPOKALEMIA PREVENTION      ondansetron (ZOFRAN ODT) 4 mg disintegrating tablet Take 4 mg by mouth every eight (8) hours as needed for Nausea. cyclobenzaprine (FLEXERIL) 10 mg tablet Take 10 mg by mouth as needed for Muscle Spasm(s). Indications: MUSCLE SPASM      carvedilol (COREG) 6.25 mg tablet Take 1 Tab by mouth two (2) times daily (with meals). Indications: hypertension  Qty: 30 Tab, Refills: 0    Associated Diagnoses: Benign hypertensive heart disease with systolic CHF, NYHA class 2 (HCC)      acetaminophen (TYLENOL) 325 mg tablet Take 2 Tabs by mouth every four (4) hours as needed (for fever or pain level less than 5/10). Indications: Fever, Pain  Qty: 30 Tab, Refills: 0    Associated Diagnoses: Iliopsoas muscle hematoma, right, subsequent encounter      allopurinol (ZYLOPRIM) 100 mg tablet Take 1 Tab by mouth daily. Indications: GOUT  Qty: 15 Tab, Refills: 0    Associated Diagnoses: Chronic gout, unspecified cause, unspecified site      insulin glargine (LANTUS) 100 unit/mL injection 15 Units by SubCUTAneous route nightly. Indications: type 2 diabetes mellitus  Qty: 1 Vial, Refills: 0    Associated Diagnoses: Type 2 diabetes mellitus with diabetic neuropathy, with long-term current use of insulin ([facility])      docusate sodium (COLACE) 100 mg capsule Take 1 Cap by mouth daily (after breakfast). Indications: Constipation  Qty: 15 Cap, Refills: 0      senna-docusate (PERICOLACE) 8.6-50 mg per tablet Take 2 Tabs by mouth daily (after dinner).  Indications: Constipation  Qty: 30 Tab, Refills: 0      oxyCODONE-acetaminophen (PERCOCET 7.5) 7.5-325 mg per tablet Take 1 Tab by mouth every four (4) hours as needed (for pain level greater than 4/10). Max Daily Amount: 6 Tabs. Indications: Pain  Qty: 50 Tab, Refills: 0    Associated Diagnoses: Iliopsoas muscle hematoma, right, subsequent encounter      bumetanide (BUMEX) 1 mg tablet TAKE 1 TABLET TWICE A DAY  Qty: 180 Tab, Refills: 1      pravastatin (PRAVACHOL) 40 mg tablet Take 40 mg by mouth nightly. Indications: DYSLIPIDEMIA      ferrous sulfate 325 mg (65 mg iron) tablet Take 1 Tab by mouth two (2) times daily (with meals).   Qty: 30 Tab, Refills: 0      insulin aspart (NOVOLOG) 100 unit/mL injection INITIATE INSULIN CORRECTIVE PROTOCOL (HR): Normal Insulin Sensitivity  For Blood Sugar (mg/dL) of:    Less than 150 =   0 units          150 -199 =   2 units 200 -249 =   4 units 250 -299 =   6 units 300 -349 =   8 units 350 and above =   10 units If 2 glucose readings are above 200 mg/dL  Qty: 10 mL, Refills: 6             Current Facility-Administered Medications   Medication Dose Route Frequency    meropenem (MERREM) 1 g in 0.9% sodium chloride (MBP/ADV) 50 mL MBP  1 g IntraVENous Q12H    insulin glargine (LANTUS) injection 7 Units  7 Units SubCUTAneous DAILY    0.9% sodium chloride infusion 250 mL  250 mL IntraVENous PRN    midodrine (PROAMITINE) tablet 10 mg  10 mg Oral Q8H    fluconazole (DIFLUCAN) 200mg/100 mL IVPB (premix)  200 mg IntraVENous Q24H    insulin lispro (HUMALOG) injection   SubCUTAneous Q6H    acetaminophen (TYLENOL) suppository 650 mg  650 mg Rectal Q6H PRN    acetaminophen (TYLENOL) tablet 500 mg  500 mg Oral Q6H PRN    diclofenac (VOLTAREN) 1 % topical gel 2 g  2 g Topical Q8H PRN    allopurinol (ZYLOPRIM) tablet 50 mg  50 mg Oral DAILY    aluminum-magnesium hydroxide (MAALOX) oral suspension 15 mL  15 mL Oral QID PRN    albuterol-ipratropium (DUO-NEB) 2.5 MG-0.5 MG/3 ML  3 mL Nebulization Q4HWA RT    Lactobacillus Acidoph & Bulgar (FLORANEX) tablet 1 Tab  1 Tab Oral BID    sodium chloride (NS) flush 5-10 mL  5-10 mL IntraVENous Q8H    naloxone (NARCAN) injection 0.1 mg  0.1 mg IntraVENous Multiple    acetaminophen (TYLENOL) tablet 650 mg  650 mg Oral Q4H PRN    famotidine (PEPCID) tablet 20 mg  20 mg Oral DAILY    0.9% sodium chloride infusion 250 mL  250 mL IntraVENous PRN    diphenhydrAMINE (BENADRYL) capsule 50 mg  50 mg Oral Q4H PRN    polyethylene glycol (MIRALAX) packet 17 g  17 g Oral TID    cholecalciferol (VITAMIN D3) tablet 2,000 Units  2,000 Units Oral DAILY    docusate sodium (COLACE) capsule 100 mg  100 mg Oral DAILY AFTER BREAKFAST    oxyCODONE-acetaminophen (PERCOCET 7.5) 7.5-325 mg per tablet 1 Tab  1 Tab Oral Q4H PRN    pravastatin (PRAVACHOL) tablet 40 mg  40 mg Oral QHS    senna-docusate (PERICOLACE) 8.6-50 mg per tablet 2 Tab  2 Tab Oral PCD    folic acid (FOLVITE) tablet 1 mg  1 mg Oral DAILY    ondansetron (ZOFRAN) injection 4 mg  4 mg IntraVENous Q8H PRN    glucose chewable tablet 16 g  16 g Oral PRN    glucagon (GLUCAGEN) injection 1 mg  1 mg IntraMUSCular PRN    dextrose (D50W) injection syrg 12.5-25 g  25-50 mL IntraVENous PRN       Allergies: Vancomycin; Ampicillin; Bactrim [sulfamethoxazole-trimethoprim]; Blueberry; Ciprofloxacin; Codeine; Crestor [rosuvastatin]; Darvocet a500 [propoxyphene n-acetaminophen]; Demerol [meperidine]; Levaquin [levofloxacin]; Lipitor [atorvastatin]; Magnesium oxide; Minocin [minocycline]; Pcn [penicillins]; Pravachol [pravastatin]; Sulfa (sulfonamide antibiotics); Ultracet [tramadol-acetaminophen]; Vicodin [hydrocodone-acetaminophen];  Vytorin 10-10 [ezetimibe-simvastatin]; and Percodan [oxycodone hcl-oxycodone-asa]    Temp (24hrs), Av.3 °F (36.8 °C), Min:97.6 °F (36.4 °C), Max:99.5 °F (37.5 °C)    Visit Vitals    /70 (BP 1 Location: Left arm, BP Patient Position: At rest)    Pulse 87    Temp 98 °F (36.7 °C)    Resp 18    Ht 5' 7\" (1.702 m)    Wt 115.6 kg (254 lb 13.6 oz)    SpO2 94%    Breastfeeding No    BMI 39.92 kg/m2       ROS: patient unable to communicate fluently. Detailed ros not feasible. Physical Exam:    General: Well developed, well nourished female laying on the bed, AAOx3 NAD    General:   awake alert and oriented   HEENT:  Normocephalic, atraumatic, PERRL, EOMI, no scleral icterus or pallor; no conjunctival hemmohage;  nasal and oral mucous are moist and without evidence of lesions. Neck supple, no bruits. Lymph Nodes:   no cervical, axillary or inguinal adenopathy   Lungs:   non-labored, bilaterally clear to auscultation- no crackles wheezes rales or rhonchi   Heart:   s1 and s2 irregular; no rubs or gallops, no edema, + pedal pulses   Abdomen:  soft, non-distended, active bowel sounds, no hepatomegaly, no splenomegaly. no tenderness over the left abdominal pacemaker, no overlying erythema or fluctuance, drain right lower abdomen with serous drainage, mild linnette umbilical tenderness   Genitourinary:  deferred   Extremities:   no clubbing, cyanosis; no joint effusions or swelling; muscle mass appropriate for age   Neurologic:  No gross focal sensory abnormalities; 5/5 muscle strength to upper extremities. 0/5 strength in lower extremities.   Cranial nerves intact                        Skin:  Surgical scars abdomen, left neck well healed   Back:   no paraspinal muscle guarding or rigidity, no right CVA tenderness     Psychiatric:  No suicidal or homicidal ideations, appropriate mood and affect         Labs: Results:   Chemistry Recent Labs      05/19/17 0335 05/18/17 0411 05/17/17 0142   GLU  100*  163*  131*   NA  139  139  137   K  3.8  3.3*  3.6   CL  105  105  103   CO2  28  26  25   BUN  22*  23*  22*   CREA  1.72*  2.15*  2.50*   CA  8.5  8.7  8.7   AGAP  6  8  9   BUCR  13  11*  9*   AP  211*  218*  250*   TP  5.9*  5.8*  6.1*   ALB  2.2*  2.4*  2.9*   GLOB  3.7  3.4  3.2   AGRAT  0.6*  0.7*  0.9      CBC w/Diff Recent Labs      05/19/17 0335 05/18/17 0411 05/17/17 0142   WBC  11.1  9.6  9.4   RBC  3.11*  2.94*  2.49* HGB  8.1*  7.7*  6.6*   HCT  25.0*  23.7*  20.5*   PLT  246  263  217   GRANS  68  73  81*   LYMPH  19*  17*  11*   EOS  3  2  1      Microbiology Recent Labs      05/17/17   1300   CULT  NO GROWTH 2 DAYS          RADIOLOGY:    All available imaging studies/reports in [facility] for this admission were reviewed    Dr. Jah Parisi, Infectious Disease Specialist  [phone]  May 19, 2017  3:49 PM

## 2017-05-19 NOTE — WOUND CARE
Physical Exam   Musculoskeletal:        Legs:    Wound Gluteal fold / cleft (Active)   DRESSING TYPE Zinc based paste;Open to air 5/19/2017  1:16 PM   Non-Pressure Injury Partial thickness (epider/derm) 5/19/2017  1:16 PM   Wound Length (cm) 1.5 cm 5/19/2017  1:16 PM   Wound Width (cm) 0.3 cm 5/19/2017  1:16 PM   Wound Depth (cm) 0.2 5/19/2017  1:16 PM   Wound Surface area (cm^3) 0.09 cm^2 5/19/2017  1:16 PM   Condition of Base Granulation 5/19/2017  1:16 PM   Condition of Edges Closed 5/19/2017  1:16 PM   Tissue Type Red 5/19/2017  1:16 PM   Tissue Type Percent Red 100 5/19/2017  1:16 PM   Drainage Amount  None 5/19/2017  1:16 PM   Wound Odor None 5/19/2017  1:16 PM   Periwound Skin Condition Intact 5/19/2017  1:16 PM   Cleansing and Cleansing Agents  Other (comment) 5/19/2017  1:16 PM   Dressing Type Applied Zinc based paste;Open to air 5/19/2017  1:16 PM   Procedure Tolerated Well 5/19/2017  1:16 PM   Number of days:1       Wound Buttocks Right (Active)   DRESSING STATUS Removed 5/19/2017  1:16 PM   DRESSING TYPE Silicone 6/72/7075  5:33 PM   Non-Pressure Injury Partial thickness (epider/derm) 5/19/2017  1:16 PM   Wound Length (cm) 0.5 cm 5/19/2017  1:16 PM   Wound Width (cm) 0.5 cm 5/19/2017  1:16 PM   Wound Depth (cm) 0.1 5/19/2017  1:16 PM   Wound Surface area (cm^3) 0.02 cm^2 5/19/2017  1:16 PM   Condition of Base [place] 5/19/2017  1:16 PM   Tissue Type Percent Pink 100 5/19/2017  1:16 PM   Drainage Amount  None 5/19/2017  1:16 PM   Wound Odor None 5/19/2017  1:16 PM   Cleansing and Cleansing Agents  Other (comment) 5/19/2017  1:16 PM   Dressing Type Applied Zinc based paste;Open to air 5/19/2017  1:16 PM   Procedure Tolerated Well 5/19/2017  1:16 PM   Number of days:1       456: Patient reassessed by wound care for hospital acquired injury to gluteal fold being noted during prevalence. Barrier creamed applied after patient cleaned up from incontinent episode of liquid stool.  Nursing to monitor and document wound status, notify MD and wound care of futher issues. Care turned over to nursing.   Carolina Morton, Wound Care Department

## 2017-05-19 NOTE — PROGRESS NOTES
[facility]  Pulmonary, Critical Care, and Sleep Medicine    Name: Solo Arora MRN: [id number]   : 1950 Hospital: [address] [place] [address]   Date: 2017        Subjective/Interval History:     Patient is a 77 y.o. female w/hx of HTN, CHF, heart block s/p pacer, multiple vascular procedures in neck veins with residual stenoses, prior Hx of DVT, DM, Afib, recent admission for Rt psoas hematoma. Was readmitted on 17 for acute paraplegia, fevers, sweats. Repeat CT demonstrated increased size of psoas fluid collection. Labs notable for leukocytosis and LISBETH. Pt brought to IR and 300ml of pus aspirated. Hypotensive upon return to floor, and still so after ~1500 ml IVF. Pt was transferred to ICU on 17 for hypotension s/p procedure; recovered with pressor support and fluids. Pt was then transferred out of ICU floor. Last seen by Pulmonary Beni Mackey - 17: Pulmonary consult team re-consulted for SOB x2-3 days without clear cause - cardiac enzymes (-);  VQ scan - low probability for PE; PVL's (+) DVT in R Common Femoral Vein & L popliteal vein. Pt then underwent emergent IVC placement d/t inability to anticoagulate using heparin 2/2 hx Psoas muscle hematoma. 05/15/17: Transferred to ICU for altered mental status & Hypotension  17: Transferred back to Floor - 4N    17:   - Alert, awake, resting comfortably on 2LPM NC; NAD  - SOB still present but states but continues to improve; still c/o some chest tightness with her breathing treatments and when moving her upper extremities- states she just feels weak - improving  - Overall, appears improved  - Still not eating great, but improving PO intake  - Denies any substernal CP, palpitations, abd pain, N/V/D, F/C, H/A    ROS:Pertinent items are noted in HPI.     Objective:   Vital Signs:    Visit Vitals    /70 (BP 1 Location: Left arm, BP Patient Position: At rest)    Pulse 87    Temp 98 °F (36.7 °C)    Resp 18    Ht 5' 7\" (1.702 m)    Wt 115.6 kg (254 lb 13.6 oz)    SpO2 94%    Breastfeeding No    BMI 39.92 kg/m2       O2 Device: Nasal cannula   O2 Flow Rate (L/min): 2 l/min (Weaned from 2.5)   Temp (24hrs), Av.3 °F (36.8 °C), Min:97.6 °F (36.4 °C), Max:99.5 °F (37.5 °C)       Intake/Output:   Last shift:         Last 3 shifts:  190 -  0700  In: 628 [P.O.:600; I.V.:150]  Out: 1285 [Urine:1275; Drains:10]    Intake/Output Summary (Last 24 hours) at 17 1018  Last data filed at 17 0615   Gross per 24 hour   Intake              460 ml   Output              625 ml   Net             -165 ml        Physical Exam:    General: Alert, awake, resting comfortably on 2LPM NC   HEENT: Anicteric sclerae; pink palpebral conjunctivae; mucosa moist   Resp: Symmetrical chest rise; mod AE bilat; diminished bibasilar; CTAB; no wheezes/rhonchi/rales noted. CV: RRR, S1S2 normal; No m/r/g   Abdomen: Soft, non-tender; (+) B. S x4; No organomegaly   Extremity: +1 BLE edema; No cyanosis/clubbing noted; peripheral pulses 2+ and symmetrical   Neuro: Alert, non-focal   Skin: Warm, dry; No rashes/lesions noted. PICC L upper arm. DATA:  Labs:  Recent Labs      17   WBC  11.1  9.6  9.4   HGB  8.1*  7.7*  6.6*   HCT  25.0*  23.7*  20.5*   PLT  246  263  217     Recent Labs      17   NA  139  139  137   K  3.8  3.3*  3.6   CL  105  105  103   CO2  28  26  25   GLU  100*  163*  131*   BUN  22*  23*  22*   CREA  1.72*  2.15*  2.50*   CA  8.5  8.7  8.7   MG  1.7  1.9  2.0   ALB  2.2*  2.4*  2.9*   SGOT  50*  22  30   ALT  27  23  29       PFT:   N/A                                                   Echo [17]:  SUMMARY:  Left ventricle: Size was at the upper limits of normal. Systolic function  was mildly reduced by visual assessment. Ejection fraction was estimated  to be 45 %.  There was severe hypokinesis of the basal-mid inferoseptal,  apical septal, and apical wall(s). Structure noted in the apex as  mentioned on previous studies. Possible muscle band vs fibrinous mass. Imaging:  No new imaging    CT ABD PELV WO CONT [05/16/17]: IMPRESSION:  1. Smaller right psoas muscle implying smaller intramuscular fluid collection and less edema/inflammation. Draining catheter is still in place. Limited evaluation without IV contrast. Slightly smaller presacral fluid with no discrete collection. 2. Less severe left lower lobe pulmonary consolidation but more severe right lower lobe consolidation. No pleural effusion. 3. Worsening anasarca/subcutaneous edema on both sides. 4. More fluid in the ascending and transverse colon, colitis or diarrheal disease? [x]I have personally reviewed the patients radiographs  [x]Radiographs reviewed with radiologist   [x]No change from prior, tubes and lines in adequate position  []Improved   [x]Worsening    IMPRESSION:   · R sided Pleural effusion - Slightly worse on CT (05/16/17) - will con't to monitor  · (+) DVT -  R Common Femoral Vein and L popliteal vein; s/p IVC filter. V/Q scan on 5/11/17: low probability. Small PE is possible but doubtful currently and given current clinical status not a candidate for systemic anticoagulation. · Hypoxia d/t atelectasis - Essentially resolved - still on 2LPM NC   · Atypical Chest pain- doubt cardiac or pulmonary etiology. ?? Diaphragmatic irritation from abdominally placed pacemaker? ? Vs GI/ esophageal etiology. - Improving   · Sepsis - Initially 2/2 infected psoas hematoma/epidural abscess (GAS) with neurological compromise- now with GPC in blood and right-sided infiltrate. ? PICC infection, ? UTI, ? HCAP, pacer infection, fungemia. Also has new LUQ pain (?splenic abscess).    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, resulting in spinal cord infarct- stable  · Enterococcus faecalis in urine (gent susceptible)  · Bacteremia   · Infected pacer pocket  · Psoas hematoma  · LISBETH - D/t neurogenic bladder and component of ATN  · NSTEMI   · Hx of CHF and Afib  · Prior complicated pacer placement (apparently upper extremity and central vein stenoses)  · Inability to obtain MRI (pacer)     Patient Active Problem List   Diagnosis Code    Nonischemic cardiomyopathy ([facility] I42.8    History of complete heart block Z86.79    Biventricular implantable cardioverter-defibrillator in situ Z95.810    Left bundle branch block (LBBB) on electrocardiogram I44.7    Type 2 diabetes mellitus with diabetic neuropathy ([facility]) E11.40    Dyslipidemia E78.5    Diabetic neuropathy associated with type 2 diabetes mellitus ([facility] E11.40    Obstructive sleep apnea on CPAP G47.33, Z99.89    AICD generator infection ([facility] T82. 7XXA    Difficult airway for intubation T88. 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 1XXA    Impaired mobility and ADLs Z74.09    History of Coumadin therapy Z79.01    Gout M10.9    Acute paraplegia ([facility]) G82.20    Sepsis ([facility] A41.9    Psoas abscess, right ([facility] K68.12    Krueger catheter in place on admission Z96.0    Urinary tract infection due to Enterococcus N39.0, B95.2    Group B streptococcal infection A49.1      PLAN:   · SpO2 goal >92%; titrate supp O2 PRN; currently resting comfortably on 2LPM NC - Discussed with RT - will titrate O2 down to RA today or tomorrow. · Aspiration precautions, HOB >30'  · Will repeat CXR / ABG if respiratory status worsens or mental status changes dramatically  · Pulmonary toileting; encourage ICS; con't EZ Pap with duo-nebs q4 while awake; pt still not taking very deep breaths - con't to encourage pt on deep breathing to help expand lungs. · No indication for steroids at this time  · Afebrile; aleukocytosis; normotensive; will monitor s/s for infection; current ABX: Merrem q12; Diflucan 200mg q24 hrs.    · Encourage pt to eat; PT/OT  · Will con't to monitor closely - Low threshold for decompensation   · DVT/PUD Prophylaxis per primary        Ngoc Amezcua PA-C

## 2017-05-19 NOTE — CDMP QUERY
IN ORDER TO REFLECT SEVERITY OF ILLNESS PT DOCUMENT PER [facility] RN  -Wound Gluteal fold / cleft (Active) Skin tear HAC  - Lt buttock friction injury HAC  Thank you,CDMP

## 2017-05-19 NOTE — ROUTINE PROCESS
Bedside shift change report given to DANIEL Pimentel (oncoming nurse) by Gemma Rodgers (offgoing nurse). Report included the following information SBAR, Kardex, MAR and Recent Results.

## 2017-05-19 NOTE — PROGRESS NOTES
Occupational Therapy Note:  Second attempt; this patient is receiving medications.  Will f/u later as appropriate for this patient. Maria Dolores Castillo, OTR/L

## 2017-05-20 LAB
ALBUMIN SERPL BCP-MCNC: 2.2 G/DL (ref 3.4–5)
ALBUMIN/GLOB SERPL: 0.6 {RATIO} (ref 0.8–1.7)
ALP SERPL-CCNC: 191 U/L (ref 45–117)
ALT SERPL-CCNC: 27 U/L (ref 13–56)
ANION GAP BLD CALC-SCNC: 5 MMOL/L (ref 3–18)
AST SERPL W P-5'-P-CCNC: 46 U/L (ref 15–37)
BASOPHILS # BLD AUTO: 0 K/UL (ref 0–0.06)
BASOPHILS # BLD: 0 % (ref 0–3)
BILIRUB DIRECT SERPL-MCNC: 0.2 MG/DL (ref 0–0.2)
BILIRUB SERPL-MCNC: 0.6 MG/DL (ref 0.2–1)
BUN SERPL-MCNC: 21 MG/DL (ref 7–18)
BUN/CREAT SERPL: 14 (ref 12–20)
CALCIUM SERPL-MCNC: 8.7 MG/DL (ref 8.5–10.1)
CHLORIDE SERPL-SCNC: 104 MMOL/L (ref 100–108)
CO2 SERPL-SCNC: 29 MMOL/L (ref 21–32)
CREAT SERPL-MCNC: 1.45 MG/DL (ref 0.6–1.3)
DIFFERENTIAL METHOD BLD: ABNORMAL
EOSINOPHIL # BLD: 0.3 K/UL (ref 0–0.4)
EOSINOPHIL NFR BLD: 2 % (ref 0–5)
ERYTHROCYTE [DISTWIDTH] IN BLOOD BY AUTOMATED COUNT: 17.5 % (ref 11.6–14.5)
GLOBULIN SER CALC-MCNC: 3.7 G/DL (ref 2–4)
GLUCOSE BLD STRIP.AUTO-MCNC: 122 MG/DL (ref 70–110)
GLUCOSE BLD STRIP.AUTO-MCNC: 128 MG/DL (ref 70–110)
GLUCOSE BLD STRIP.AUTO-MCNC: 137 MG/DL (ref 70–110)
GLUCOSE BLD STRIP.AUTO-MCNC: 139 MG/DL (ref 70–110)
GLUCOSE BLD STRIP.AUTO-MCNC: 164 MG/DL (ref 70–110)
GLUCOSE SERPL-MCNC: 126 MG/DL (ref 74–99)
HCT VFR BLD AUTO: 25.9 % (ref 35–45)
HGB BLD-MCNC: 8.4 G/DL (ref 12–16)
LYMPHOCYTES # BLD AUTO: 12 % (ref 20–51)
LYMPHOCYTES # BLD: 1.6 K/UL (ref 0.8–3.5)
MAGNESIUM SERPL-MCNC: 1.8 MG/DL (ref 1.6–2.6)
MCH RBC QN AUTO: 26.2 PG (ref 24–34)
MCHC RBC AUTO-ENTMCNC: 32.4 G/DL (ref 31–37)
MCV RBC AUTO: 80.7 FL (ref 74–97)
METAMYELOCYTES NFR BLD MANUAL: 1 %
MONOCYTES # BLD: 1.4 K/UL (ref 0–1)
MONOCYTES NFR BLD AUTO: 11 % (ref 2–9)
NEUTS BAND NFR BLD MANUAL: 1 % (ref 0–5)
NEUTS SEG # BLD: 9.6 K/UL (ref 1.8–8)
NEUTS SEG NFR BLD AUTO: 73 % (ref 42–75)
NRBC BLD-RTO: 1 PER 100 WBC
PLATELET # BLD AUTO: 226 K/UL (ref 135–420)
PLATELET COMMENTS,PCOM: ABNORMAL
PMV BLD AUTO: 8.2 FL (ref 9.2–11.8)
POTASSIUM SERPL-SCNC: 3.4 MMOL/L (ref 3.5–5.5)
PROT SERPL-MCNC: 5.9 G/DL (ref 6.4–8.2)
RBC # BLD AUTO: 3.21 M/UL (ref 4.2–5.3)
RBC MORPH BLD: ABNORMAL
SODIUM SERPL-SCNC: 138 MMOL/L (ref 136–145)
WBC # BLD AUTO: 13 K/UL (ref 4.6–13.2)

## 2017-05-20 PROCEDURE — 74011250637 HC RX REV CODE- 250/637: Performed by: INTERNAL MEDICINE

## 2017-05-20 PROCEDURE — 85025 COMPLETE CBC W/AUTO DIFF WBC: CPT | Performed by: NURSE PRACTITIONER

## 2017-05-20 PROCEDURE — 65270000029 HC RM PRIVATE

## 2017-05-20 PROCEDURE — 94640 AIRWAY INHALATION TREATMENT: CPT

## 2017-05-20 PROCEDURE — 65270000032 HC RM SEMIPRIVATE

## 2017-05-20 PROCEDURE — 80048 BASIC METABOLIC PNL TOTAL CA: CPT | Performed by: NURSE PRACTITIONER

## 2017-05-20 PROCEDURE — 74011250637 HC RX REV CODE- 250/637: Performed by: HOSPITALIST

## 2017-05-20 PROCEDURE — 74011636637 HC RX REV CODE- 636/637: Performed by: FAMILY MEDICINE

## 2017-05-20 PROCEDURE — 74011000250 HC RX REV CODE- 250: Performed by: UROLOGY

## 2017-05-20 PROCEDURE — 36415 COLL VENOUS BLD VENIPUNCTURE: CPT | Performed by: NURSE PRACTITIONER

## 2017-05-20 PROCEDURE — 74011250636 HC RX REV CODE- 250/636: Performed by: INTERNAL MEDICINE

## 2017-05-20 PROCEDURE — 74011250637 HC RX REV CODE- 250/637: Performed by: FAMILY MEDICINE

## 2017-05-20 PROCEDURE — 83735 ASSAY OF MAGNESIUM: CPT | Performed by: NURSE PRACTITIONER

## 2017-05-20 PROCEDURE — 74011000258 HC RX REV CODE- 258: Performed by: INTERNAL MEDICINE

## 2017-05-20 PROCEDURE — 80076 HEPATIC FUNCTION PANEL: CPT | Performed by: NURSE PRACTITIONER

## 2017-05-20 PROCEDURE — 74011636637 HC RX REV CODE- 636/637: Performed by: INTERNAL MEDICINE

## 2017-05-20 PROCEDURE — 82962 GLUCOSE BLOOD TEST: CPT

## 2017-05-20 RX ORDER — POTASSIUM CHLORIDE 20 MEQ/1
40 TABLET, EXTENDED RELEASE ORAL
Status: COMPLETED | OUTPATIENT
Start: 2017-05-20 | End: 2017-05-20

## 2017-05-20 RX ADMIN — LACTOBACILLUS TAB 1 TABLET: TAB at 09:12

## 2017-05-20 RX ADMIN — ACETAMINOPHEN 500 MG: 500 TABLET ORAL at 17:13

## 2017-05-20 RX ADMIN — POTASSIUM CHLORIDE 40 MEQ: 20 TABLET, EXTENDED RELEASE ORAL at 12:20

## 2017-05-20 RX ADMIN — MEROPENEM 1 G: 1 INJECTION, POWDER, FOR SOLUTION INTRAVENOUS at 10:30

## 2017-05-20 RX ADMIN — FLUCONAZOLE 200 MG: 2 INJECTION INTRAVENOUS at 09:12

## 2017-05-20 RX ADMIN — MIDODRINE HYDROCHLORIDE 10 MG: 2.5 TABLET ORAL at 16:34

## 2017-05-20 RX ADMIN — INSULIN GLARGINE 7 UNITS: 100 INJECTION, SOLUTION SUBCUTANEOUS at 09:10

## 2017-05-20 RX ADMIN — CHOLECALCIFEROL TAB 25 MCG (1000 UNIT) 2000 UNITS: 25 TAB at 09:12

## 2017-05-20 RX ADMIN — Medication 10 ML: at 14:00

## 2017-05-20 RX ADMIN — FOLIC ACID 1 MG: 1 TABLET ORAL at 09:12

## 2017-05-20 RX ADMIN — Medication 10 ML: at 09:30

## 2017-05-20 RX ADMIN — MEROPENEM 1 G: 1 INJECTION, POWDER, FOR SOLUTION INTRAVENOUS at 00:25

## 2017-05-20 RX ADMIN — MIDODRINE HYDROCHLORIDE 10 MG: 2.5 TABLET ORAL at 09:12

## 2017-05-20 RX ADMIN — IPRATROPIUM BROMIDE AND ALBUTEROL SULFATE 3 ML: 2.5; .5 SOLUTION RESPIRATORY (INHALATION) at 07:33

## 2017-05-20 RX ADMIN — INSULIN LISPRO 3 UNITS: 100 INJECTION, SOLUTION INTRAVENOUS; SUBCUTANEOUS at 12:37

## 2017-05-20 RX ADMIN — MIDODRINE HYDROCHLORIDE 10 MG: 2.5 TABLET ORAL at 00:26

## 2017-05-20 RX ADMIN — DOCUSATE SODIUM 100 MG: 100 CAPSULE, LIQUID FILLED ORAL at 09:12

## 2017-05-20 RX ADMIN — IPRATROPIUM BROMIDE AND ALBUTEROL SULFATE 3 ML: 2.5; .5 SOLUTION RESPIRATORY (INHALATION) at 11:31

## 2017-05-20 RX ADMIN — Medication 10 ML: at 00:27

## 2017-05-20 RX ADMIN — PRAVASTATIN SODIUM 40 MG: 20 TABLET ORAL at 00:25

## 2017-05-20 RX ADMIN — IPRATROPIUM BROMIDE AND ALBUTEROL SULFATE 3 ML: 2.5; .5 SOLUTION RESPIRATORY (INHALATION) at 19:45

## 2017-05-20 RX ADMIN — LACTOBACILLUS TAB 1 TABLET: TAB at 17:16

## 2017-05-20 RX ADMIN — POLYETHYLENE GLYCOL 3350 17 G: 17 POWDER, FOR SOLUTION ORAL at 00:26

## 2017-05-20 RX ADMIN — FAMOTIDINE 20 MG: 20 TABLET ORAL at 09:12

## 2017-05-20 RX ADMIN — ALLOPURINOL 50 MG: 100 TABLET ORAL at 09:12

## 2017-05-20 RX ADMIN — Medication 2 TABLET: at 17:16

## 2017-05-20 NOTE — PROGRESS NOTES
[facility]  Pulmonary, Critical Care, and Sleep Medicine    Name: Benjamin Obregon MRN: [id number]   : 1950 Hospital: [address] [place] [address]   Date: 2017        Subjective/Interval History:     Patient is a 77 y.o. female w/hx of HTN, CHF, heart block s/p pacer, multiple vascular procedures in neck veins with residual stenoses, prior Hx of DVT, DM, Afib, recent admission for Rt psoas hematoma. Was readmitted on 17 for acute paraplegia, fevers, sweats. Repeat CT demonstrated increased size of psoas fluid collection. Labs notable for leukocytosis and LISBETH. Pt brought to IR and 300ml of pus aspirated. Hypotensive upon return to floor, and still so after ~1500 ml IVF. Pt was transferred to ICU on 17 for hypotension s/p procedure; recovered with pressor support and fluids. Pt was then transferred out of ICU floor. Last seen by Pulmonary Hardik Urbinaure - 17: Pulmonary consult team re-consulted for SOB x2-3 days without clear cause - cardiac enzymes (-);  VQ scan - low probability for PE; PVL's (+) DVT in R Common Femoral Vein & L popliteal vein. Pt then underwent emergent IVC placement d/t inability to anticoagulate using heparin 2/2 hx Psoas muscle hematoma. 05/15/17: Transferred to ICU for altered mental status & Hypotension  17: Transferred back to Floor - 4N    17:   - Alert, awake, resting comfortably RA; NAD Didn't sleep well last night  - SOB still present but states but continues to improve; still c/o some chest tightness with her breathing treatments and when moving her upper extremities- states she just feels weak - improving  - Overall, appears improved  - Still not eating great, but improving PO intake  - Denies any substernal CP, palpitations, abd pain, N/V/D, F/C, H/A    ROS:Pertinent items are noted in HPI.     Objective:   Vital Signs:    Visit Vitals    /69 (BP 1 Location: Left arm, BP Patient Position: At rest)    Pulse 86  Temp 98.9 °F (37.2 °C)    Resp 18    Ht 5' 7\" (1.702 m)    Wt 115.6 kg (254 lb 13.6 oz)    SpO2 95%    Breastfeeding No    BMI 39.92 kg/m2       O2 Device: Room air   O2 Flow Rate (L/min): 0 l/min (Weaned from 2 LPM)   Temp (24hrs), Av.3 °F (36.8 °C), Min:97.7 °F (36.5 °C), Max:98.9 °F (37.2 °C)       Intake/Output:   Last shift:      701 - 1900  In: -   Out: 500 [Urine:500]  Last 3 shifts: 1901 - 700  In: 250 [P.O.:150; I.V.:100]  Out:  [Urine:]    Intake/Output Summary (Last 24 hours) at 17 09  Last data filed at 17 0736   Gross per 24 hour   Intake              150 ml   Output             2000 ml   Net            -1850 ml        Physical Exam:    General: Alert, awake, resting comfortably on 2LPM NC   HEENT: Anicteric sclerae; pink palpebral conjunctivae; mucosa moist   Resp: Symmetrical chest rise; mod AE bilat; diminished bibasilar; CTAB; no wheezes/rhonchi/rales noted. CV: RRR, S1S2 normal; No m/r/g   Abdomen: Soft, non-tender; (+) B. S x4; No organomegaly   Extremity: +1 BLE edema; No cyanosis/clubbing noted; peripheral pulses 2+ and symmetrical   Neuro: Alert, non-focal   Skin: Warm, dry; No rashes/lesions noted. PICC L upper arm. DATA:  Labs:  Recent Labs      17   0323  05/19/17   03317   041   WBC  13.0  11.1  9.6   HGB  8.4*  8.1*  7.7*   HCT  25.9*  25.0*  23.7*   PLT  226  246  263     Recent Labs      17   0323  17   0335  17   041   NA  138  139  139   K  3.4*  3.8  3.3*   CL  104  105  105   CO2  29  28  26   GLU  126*  100*  163*   BUN  21*  22*  23*   CREA  1.45*  1.72*  2.15*   CA  8.7  8.5  8.7   MG  1.8  1.7  1.9   ALB  2.2*  2.2*  2.4*   SGOT  46*  50*  22   ALT  27  27  23       PFT:   N/A                                                   Echo [17]:  SUMMARY:  Left ventricle: Size was at the upper limits of normal. Systolic function  was mildly reduced by visual assessment. Ejection fraction was estimated  to be 45 %. There was severe hypokinesis of the basal-mid inferoseptal,  apical septal, and apical wall(s). Structure noted in the apex as  mentioned on previous studies. Possible muscle band vs fibrinous mass. Imaging:  No new imaging    CT ABD PELV WO CONT [05/16/17]: IMPRESSION:  1. Smaller right psoas muscle implying smaller intramuscular fluid collection and less edema/inflammation. Draining catheter is still in place. Limited evaluation without IV contrast. Slightly smaller presacral fluid with no discrete collection. 2. Less severe left lower lobe pulmonary consolidation but more severe right lower lobe consolidation. No pleural effusion. 3. Worsening anasarca/subcutaneous edema on both sides. 4. More fluid in the ascending and transverse colon, colitis or diarrheal disease? [x]I have personally reviewed the patients radiographs  [x]Radiographs reviewed with radiologist   [x]No change from prior, tubes and lines in adequate position  []Improved   [x]Worsening    IMPRESSION:   · R sided Pleural effusion - Slightly worse on CT (05/16/17) - will con't to monitor  · (+) DVT -  R Common Femoral Vein and L popliteal vein; s/p IVC filter. V/Q scan on 5/11/17: low probability. Small PE is possible but doubtful currently and given current clinical status not a candidate for systemic anticoagulation. · Hypoxia d/t atelectasis - Essentially resolved - RA   · Atypical Chest pain- doubt cardiac or pulmonary etiology. ?? Diaphragmatic irritation from abdominally placed pacemaker? ? Vs GI/ esophageal etiology. - Improving   · Sepsis - Initially 2/2 infected psoas hematoma/epidural abscess (GAS) with neurological compromise- now with GPC in blood and right-sided infiltrate. ? PICC infection, ? UTI, ? HCAP, pacer infection, fungemia. Also has new LUQ pain (?splenic abscess).    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, resulting in spinal cord infarct- stable  · Enterococcus faecalis in urine (gent susceptible)  · Bacteremia   · Infected pacer pocket  · Psoas hematoma  · LISBETH - D/t neurogenic bladder and component of ATN  · NSTEMI   · Hx of CHF and Afib  · Prior complicated pacer placement (apparently upper extremity and central vein stenoses)  · Inability to obtain MRI (pacer)     Patient Active Problem List   Diagnosis Code    Nonischemic cardiomyopathy ([facility] I42.8    History of complete heart block Z86.79    Biventricular implantable cardioverter-defibrillator in situ Z95.810    Left bundle branch block (LBBB) on electrocardiogram I44.7    Type 2 diabetes mellitus with diabetic neuropathy ([facility]) E11.40    Dyslipidemia E78.5    Diabetic neuropathy associated with type 2 diabetes mellitus ([facility] E11.40    Obstructive sleep apnea on CPAP G47.33, Z99.89    [facility]ICD generator infection ([facility] T82. 7XXA    Difficult airway for intubation T88. 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 1XXA    Impaired mobility and ADLs Z74.09    History of Coumadin therapy Z79.01    Gout M10.9    Acute paraplegia ([facility]) G82.20    Sepsis ([facility] A41.9    Psoas abscess, right ([facility] K68.12    Krueger catheter in place on admission Z96.0    Urinary tract infection due to Enterococcus N39.0, B95.2    Group B streptococcal infection A49.1 PLAN:   · SpO2 goal >92%; titrate supp O2 PRN; currently resting comfortably onRA -   · Aspiration precautions, HOB >30'  · Will repeat CXR / ABG if respiratory status worsens or mental status changes dramatically  · Pulmonary toileting; encourage ICS; con't EZ Pap with duo-nebs q4 while awake; pt still not taking very deep breaths - con't to encourage pt on deep breathing to help expand lungs. · No indication for steroids at this time  · Afebrile; aleukocytosis; normotensive; will monitor s/s for infection; current ABX: Merrem q12; Diflucan 200mg q24 hrs.    · Encourage pt to eat; PT/OT  · Will con't to monitor closely - Low threshold for decompensation   · DVT/PUD Prophylaxis per primary        Hill Quiroz PA-C

## 2017-05-20 NOTE — PROGRESS NOTES
[facility]  Progress Note    Patient: Coy Citizen Age: 77 y.o. : 1950 MR#: [id number] SSN: [id number]  Date/Time: 2017 3:35 PM    Subjective:     Seen with daughter @ bedside. No F/C, N/V, CP, SOB. Still can only slightly wiggle R toes. Assessment/Plan:   1. Sepsis due to infected R psoas hematoma/epidural abscess - with neurologic compromise, paraplegia. Is s/p CT-guided drainage catheter, old drain removal on , growing grp B beta-hemo Strep. Continue abx per ID: Merrem through  for PNA as well as ceftriaxone on completion of Merrem course. IR to remove drain next week possibly. 2. 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 PT/OT efforts to continue. 3. LISBETH with ATN on CKDz stage 3 - due to neurogenic bladder. Intermittent cath. BUN/Cr continue to improve. Resolving. 4. HypoK+ - replete orally again. 5. DM - SSI. BSugars controlled. 6. HTN - BPs normalized, previously hypotensive earlier in hospital course. Have discontinued midodrine. No new issues. 7. Dyslipidemia - statin. 8. Elevated trop I - due to demand ischemia, #1.  9. BLE DVT - s/p IVCFilter placement . No OAC due to #1/hematoma. 10. Severe prt-shelton malnutrition - supplement. 11. Grp B beta-hemo Strep bacteremia - due to #1. Cultures reviewed. Most recent 5/15 blood cx NGTD,  with 3/4 bottles Staph epi. 12. Aspiration PNA vs HCAPNA - continue Merrem per ID, through . 13. Possible candida cystitis - fluconazole. 14. Constipation - enema prn, MiraLax. 15. Infected pacemaker pocket - s/p tx. 12. Xfusing PRBC for anemia - chronically low, Hgb @ 6.6 on . Given 1u PRBC. H/H remains stable.      Additional Notes:      Case discussed with:  [x]Patient  [x]Family  []Nursing  []Case Management  DVT Prophylaxis:  []Lovenox  []Hep SQ  [x]SCDs  []Coumadin   []On Heparin gtt    Objective:   VS: Visit Vitals    /69 (BP 1 Location: Left arm, BP Patient Position: At rest)    Pulse 86    Temp 98.9 °F (37.2 °C)    Resp 18    Ht 5' 7\" (1.702 m)    Wt 115.6 kg (254 lb 13.6 oz)    SpO2 95%    Breastfeeding No    BMI 39.92 kg/m2      Tmax/24hrs: Temp (24hrs), Av.3 °F (36.8 °C), Min:97.7 °F (36.5 °C), Max:98.9 °F (37.2 °C)      Intake/Output Summary (Last 24 hours) at 17 1116  Last data filed at 17 0736   Gross per 24 hour   Intake              150 ml   Output             2000 ml   Net            -1850 ml       General:  Awake, alert, NAD. Cardiovascular:  RRR. Pulmonary:  CTA B ant.   GI:  Soft, NT/ND, NABS. RLQ drain site c/d/i, bulb empty. Extremities:  No CT or edema. Additional:  Weakly wiggles R toes. Labs:    Recent Results (from the past 24 hour(s))   GLUCOSE, POC    Collection Time: 17 11:50 AM   Result Value Ref Range    Glucose (POC) 129 (H) 70 - 110 mg/dL   GLUCOSE, POC    Collection Time: 17  4:18 PM   Result Value Ref Range    Glucose (POC) 170 (H) 70 - 110 mg/dL   GLUCOSE, POC    Collection Time: 17 12:39 AM   Result Value Ref Range    Glucose (POC) 122 (H) 70 - 110 mg/dL   MAGNESIUM    Collection Time: 17  3:23 AM   Result Value Ref Range    Magnesium 1.8 1.6 - 2.6 mg/dL   HEPATIC FUNCTION PANEL    Collection Time: 17  3:23 AM   Result Value Ref Range    Protein, total 5.9 (L) 6.4 - 8.2 g/dL    Albumin 2.2 (L) 3.4 - 5.0 g/dL    Globulin 3.7 2.0 - 4.0 g/dL    A-G Ratio 0.6 (L) 0.8 - 1.7      Bilirubin, total 0.6 0.2 - 1.0 MG/DL    Bilirubin, direct 0.2 0.0 - 0.2 MG/DL    Alk.  phosphatase 191 (H) 45 - 117 U/L    AST (SGOT) 46 (H) 15 - 37 U/L    ALT (SGPT) 27 13 - 56 U/L   CBC WITH AUTOMATED DIFF    Collection Time: 17  3:23 AM   Result Value Ref Range    WBC 13.0 4.6 - 13.2 K/uL    RBC 3.21 (L) 4.20 - 5.30 M/uL    HGB 8.4 (L) 12.0 - 16.0 g/dL    HCT 25.9 (L) 35.0 - 45.0 %    MCV 80.7 74.0 - 97.0 FL    MCH 26.2 24.0 - 34.0 PG MCHC 32.4 31.0 - 37.0 g/dL    RDW 17.5 (H) 11.6 - 14.5 %    PLATELET 728 124 - 107 K/uL    MPV 8.2 (L) 9.2 - 11.8 FL    NEUTROPHILS 73 42 - 75 %    BAND NEUTROPHILS 1 0 - 5 %    LYMPHOCYTES 12 (L) 20 - 51 %    MONOCYTES 11 (H) 2 - 9 %    EOSINOPHILS 2 0 - 5 %    BASOPHILS 0 0 - 3 %    METAMYELOCYTES 1 (H) 0 %    NRBC 1.0 (H) 0  WBC    ABS. NEUTROPHILS 9.6 (H) 1.8 - 8.0 K/UL    ABS. LYMPHOCYTES 1.6 0.8 - 3.5 K/UL    ABS. MONOCYTES 1.4 (H) 0 - 1.0 K/UL    ABS. EOSINOPHILS 0.3 0.0 - 0.4 K/UL    ABS.  BASOPHILS 0.0 0.0 - 0.06 K/UL    DF MANUAL      PLATELET COMMENTS ADEQUATE PLATELETS      RBC COMMENTS ANISOCYTOSIS  1+        RBC COMMENTS POLYCHROMASIA  1+        RBC COMMENTS STOMATOCYTES  1+       METABOLIC PANEL, BASIC    Collection Time: 05/20/17  3:23 AM   Result Value Ref Range    Sodium 138 136 - 145 mmol/L    Potassium 3.4 (L) 3.5 - 5.5 mmol/L    Chloride 104 100 - 108 mmol/L    CO2 29 21 - 32 mmol/L    Anion gap 5 3.0 - 18 mmol/L    Glucose 126 (H) 74 - 99 mg/dL    BUN 21 (H) 7.0 - 18 MG/DL    Creatinine 1.45 (H) 0.6 - 1.3 MG/DL    BUN/Creatinine ratio 14 12 - 20      GFR est AA 44 (L) >60 ml/min/1.73m2    GFR est non-AA 36 (L) >60 ml/min/1.73m2    Calcium 8.7 8.5 - 10.1 MG/DL   GLUCOSE, POC    Collection Time: 05/20/17  7:44 AM   Result Value Ref Range    Glucose (POC) 139 (H) 70 - 110 mg/dL       Signed By: Dionte Avendaño MD     May 20, 2017 3:35 PM

## 2017-05-20 NOTE — ROUTINE PROCESS
Bedside and Verbal shift change report given to Abhinav Arnold (oncoming nurse) by Felice Givens RN (offgoing nurse). Report included the following information SBAR, Kardex, MAR and Recent Results. SITUATION:  Code Status: Full Code  Reason for Admission: Acute paraplegia  Acute paraplegia [facility])  Hospital day: 30  Problem List:       Hospital Problems  Date Reviewed: 4/20/2017          Codes Class Noted POA    * (Principal)Acute paraplegia ([facility] ICD-10-CM: G82.20  ICD-9-CM: 344.1  4/20/2017 Yes        Sepsis ([facility] ICD-10-CM: A41.9  ICD-9-CM: 038.9, 995.91  4/20/2017 Yes        Psoas abscess, right ([facility] ICD-10-CM: B31.85  ICD-9-CM: 567.31  4/20/2017 Yes        Krueger catheter in place on admission ICD-10-CM: Z96.0  ICD-9-CM: V45.89  4/20/2017 Yes        Urinary tract infection due to Enterococcus ICD-10-CM: N39.0, B95.2  ICD-9-CM: 599.0, 041.04  4/20/2017 Yes        Group B streptococcal infection ICD-10-CM: A49.1  ICD-9-CM: 041.02  4/20/2017 Yes        History of Coumadin therapy ICD-10-CM: Z79.01  ICD-9-CM: V58.61  Unknown Yes    Overview Signed 4/6/2017 10:35 PM by Kaylan Ramirez MD     Anticoagulation for chronic atrial fibrillation; Discontinued on 3/30/2017             Decreased calculated glomerular filtration rate (GFR) ICD-10-CM: R94.4  ICD-9-CM: 794.4  3/30/2017 Yes    Overview Signed 4/6/2017  5:47 PM by Kaylan Ramirez MD     Calculated GFR equivalent to that of CKD stage 3 = 30-59 ml/min             Iliopsoas muscle hematoma ICD-10-CM: S70.10XA  ICD-9-CM: 924.00  3/30/2017 Yes        Psoas hematoma, right, secondary to anticoagulant therapy ICD-10-CM: S30. 1XXA  ICD-9-CM: 924.9, E934.2  3/30/2017 Yes        Impaired mobility and ADLs ICD-10-CM: Z74.09  ICD-9-CM: 799.89  3/30/2017 Yes        Benign hypertensive heart disease with systolic CHF, NYHA class 2 (HCC) (Chronic) ICD-10-CM: I11.0, I50.20  ICD-9-CM: 402.11, 428.20, 428.0  9/5/2012 Yes        [facility] ICD-10-CM: T82. 7XXA  ICD-9-CM: 996.61  8/20/2012 Yes    Overview Signed 8/20/2012  6:13 PM by Nicolle Castaneda MD     S/p explant 4 leads 8/20/12             Type 2 diabetes mellitus with diabetic neuropathy (HCC) (Chronic) ICD-10-CM: E11.40  ICD-9-CM: 250.60, 357.2  6/28/2011 Yes              BACKGROUND:   Past Medical History:   Past Medical History:   Diagnosis Date    Acute paraplegia ([facility] 4/20/2017    Benign hypertensive heart disease with systolic CHF, NYHA class 2 ([facility] 9/5/2012    Biventricular implantable cardioverter-defibrillator in situ 04/28/2005    Upgraded to BiV AICD; gen change 4/2008; pocket revision 10/2009; Abdominal - done on 8/22/2012 by Dr. Marquez Tillman Cardiac cath 08/15/1996    Patent coronaries. Elev LVEDP. EF 50-55%.  Cardiac echocardiogram 06/23/2015    Ltd study. EF 45-50%. Mild, diffuse hypk. Severe apical hypk. No mass or thrombus was clearly identified, although imaging was suboptimal.      Cardiac nuclear imaging test 06/19/2015    Fixed distal apical, distal septal defect more likely due to RV pacing than prior infarct. No ischemia. EF 46%. RWMA c/w RV pacing. Nondiagnostic EKG on pharm stress test.      Cardiovascular lower extremity venous duplex 09/04/2012    Acute, non-occlusive DVT in CFV on right. No DVT on left. No superficial thrombosis bilaterally.  Cardiovascular upper extremity venous duplex 08/27/2012    DVT in axillary vein on left. Left subclavian was not visualized.     Chronic anemia 9/5/2012    Chronic systolic heart failure (HCC)     Decreased calculated glomerular filtration rate (GFR) 3/30/2017    Calculated GFR equivalent to that of CKD stage 3 = 30-59 ml/min    Diabetic neuropathy associated with type 2 diabetes mellitus ([facility] 6/28/2011    Difficult airway for intubation 08/22/2012    see anesthesia airway note    Dyslipidemia 6/28/2011    Gout     History of complete heart block 6/28/2011    History of Coumadin therapy Anticoagulation for DVT of the LUE; Discontinued on 3/30/2017    History of deep venous thrombosis 9/5/2012    Left upper extremity    History of pyelonephritis 3/30/2017    Left bundle branch block (LBBB) on electrocardiogram 6/28/2011    Nonischemic cardiomyopathy ([facility] 6/28/2011    Obesity (BMI 35.0-39.9 without comorbidity) [facility] 3/13/2017    Obstructive sleep apnea on CPAP 2/7/2012    Pso[facility] abscess, right [facility] 4/20/2017    Psoas hematoma, right, secondary to anticoagulant therapy 3/30/2017    Type 2 diabetes mellitus with diabetic neuropathy ([facility] 6/28/2011      Patient taking anticoagulants no    Patient has a defibrillator: yes    History of shots YES for example, flu, pneumonia, tetanus   Isolation History NO for example, MRSA, CDiff    ASSESSMENT:  Changes in Assessment Throughout Shift: None  Significant Changes in 24 hours (for example, RR/code, fall)  Patient has Central Line: yes Reasons if yes: Long term antibiotics  Patient has Krueger Cath: yes Reasons if yes: Urinary retention   Mobility Issues  PT  IV Patency  OR Checklist  Pending Tests    Last Vitals:  Vitals w/ MEWS Score (last day)     Date/Time MEWS Score Pulse Resp Temp BP Level of Consciousness SpO2    05/20/17 0736 -- -- -- -- -- -- 95 %    05/20/17 0412 1 77 18 98 °F (36.7 °C) 136/68 Alert 95 %    05/20/17 0000 1 77 18 97.9 °F (36.6 °C) 131/68 Alert 95 %    05/19/17 2046 -- -- -- -- -- -- 94 %    05/19/17 2000 1 78 18 97.7 °F (36.5 °C) 140/73 Alert 99 %    05/19/17 1621 1 80 18 98.6 °F (37 °C) 148/75 Alert 93 %    05/19/17 1519 -- -- -- -- -- -- 93 %    05/19/17 1234 -- -- -- -- -- -- 98 %    05/19/17 1200 1 80 18 98.6 °F (37 °C) 153/81 Alert 96 %    05/19/17 0906 1 87 18 98 °F (36.7 °C) 138/70 Alert 94 %    05/19/17 0837 -- -- -- -- -- -- 98 %    05/19/17 0418 1 74 18 98.8 °F (37.1 °C) 144/59 Alert 95 %    05/19/17 0411 1 74 18 98.6 °F (37 °C) 154/74 Alert 94 %            PAIN    Pain Assessment    Pain Intensity 1: 0 (05/20/17 5204)    Pain Location 1: Head    Pain Intervention(s) 1: Medication (see MAR)    Patient Stated Pain Goal: 0  Intervention effective: N/A  Time of last intervention: N/A Reassessment Completed: N/A  Other actions taken for pain: N/A    Last 3 Weights:  Last 3 Recorded Weights in this Encounter    05/17/17 0300 05/18/17 0737 05/19/17 0918   Weight: 117.4 kg (258 lb 12.8 oz) 117 kg (258 lb) 115.6 kg (254 lb 13.6 oz)   Weight change: -1.428 kg (-3 lb 2.4 oz)    INTAKE/OUPUT    Current Shift:      Last three shifts: 05/18 1901 - 05/20 0700  In: 250 [P.O.:150; I.V.:100]  Out: 2125 [Urine:2125]    RECOMMENDATIONS AND DISCHARGE PLANNING  Patient needs and requests: None    Pending tests/procedures: None     Discharge plan for patient: TBD    Discharge planning Needs or Barriers: TBD    Estimated Discharge Date: TBD Posted on Whiteboard in Patients Room: yes       \"HEALS\" SAFETY CHECK  A safety check occurred in the patient's room between off going nurse and oncoming nurse listed above. The safety check included the below items:    H  High Alert Medications Verify all high alert medication drips (heparin, PCA, etc.)  E  Equipment Suction is set up for ALL patients (with vania)  Red plugs utilized for all equipment (IV pumps, etc.)  WOWs wiped down at end of shift. Room stocked with oxygen, suction, and other unit-specific supplies  A  Alarms Bed alarm is set for fall risk patients  Ensure chair alarm is in place and activated if patient is up in a chair  L  Lines Check IV for any infiltration  Krueger bag is empty if patient has a Krueger   Tubing and IV bags are labeled  S  Safety  Room is clean, patient is clean, and equipment is clean. Hallways are clear from equipment besides carts. Fall bracelet on for fall risk patients  Ensure room is clear and free of clutter  Suction is set up for ALL patients (with vania)  Hallways are clear from equipment besides carts.    Isolation precautions followed, supplies available outside room, sign posted    Lucia Desai RN

## 2017-05-20 NOTE — ROUTINE PROCESS
Bedside and Verbal shift change report given to Laurence Vann RN (oncoming nurse) by Maryellen Vigil RN (offgoing nurse). Report included the following information SBAR, Kardex, ED Summary, Procedure Summary, Intake/Output, MAR and Recent Results.

## 2017-05-20 NOTE — ROUTINE PROCESS
Bedside and Verbal shift change report given to Ernestina Gonzalez, DANIEL (oncoming nurse) by Teresita Lay RN (offgoing nurse). Report included the following information SBAR, Kardex, MAR and Recent Results. SITUATION:  Code Status: Full Code  Reason for Admission: Acute paraplegia  Acute paraplegia [facility])  Hospital day: 30  Problem List:       Hospital Problems  Date Reviewed: 4/20/2017          Codes Class Noted POA    * (Principal)Acute paraplegia ([facility] ICD-10-CM: G82.20  ICD-9-CM: 344.1  4/20/2017 Yes        Sepsis ([facility] ICD-10-CM: A41.9  ICD-9-CM: 038.9, 995.91  4/20/2017 Yes        Psoas abscess, right ([facility] ICD-10-CM: L89.78  ICD-9-CM: 567.31  4/20/2017 Yes        Krueger catheter in place on admission ICD-10-CM: Z96.0  ICD-9-CM: V45.89  4/20/2017 Yes        Urinary tract infection due to Enterococcus ICD-10-CM: N39.0, B95.2  ICD-9-CM: 599.0, 041.04  4/20/2017 Yes        Group B streptococcal infection ICD-10-CM: A49.1  ICD-9-CM: 041.02  4/20/2017 Yes        History of Coumadin therapy ICD-10-CM: Z79.01  ICD-9-CM: V58.61  Unknown Yes    Overview Signed 4/6/2017 10:35 PM by Pearlean Felty, MD     Anticoagulation for chronic atrial fibrillation; Discontinued on 3/30/2017             Decreased calculated glomerular filtration rate (GFR) ICD-10-CM: R94.4  ICD-9-CM: 794.4  3/30/2017 Yes    Overview Signed 4/6/2017  5:47 PM by Pearlean Felty, MD     Calculated GFR equivalent to that of CKD stage 3 = 30-59 ml/min             Iliopsoas muscle hematoma ICD-10-CM: S70.10XA  ICD-9-CM: 924.00  3/30/2017 Yes        Psoas hematoma, right, secondary to anticoagulant therapy ICD-10-CM: S30. 1XXA  ICD-9-CM: 924.9, E934.2  3/30/2017 Yes        Impaired mobility and ADLs ICD-10-CM: Z74.09  ICD-9-CM: 799.89  3/30/2017 Yes        Benign hypertensive heart disease with systolic CHF, NYHA class 2 (HCC) (Chronic) ICD-10-CM: I11.0, I50.20  ICD-9-CM: 402.11, 428.20, 428.0  9/5/2012 Yes        AICD generator infection ([facility] ICD-10-CM: T82. 7XXA  ICD-9-CM: 996.61  8/20/2012 Yes    Overview Signed 8/20/2012  6:13 PM by Eligio Matute MD     S/p explant 4 leads 8/20/12             Type 2 diabetes mellitus with diabetic neuropathy (HCC) (Chronic) ICD-10-CM: E11.40  ICD-9-CM: 250.60, 357.2  6/28/2011 Yes              BACKGROUND:   Past Medical History:   Past Medical History:   Diagnosis Date    Acute paraplegia ([facility] 4/20/2017    Benign hypertensive heart disease with systolic CHF, NYHA class 2 ([address] 9/5/2012    Biventricular implantable cardioverter-defibrillator in situ 04/28/2005    Upgraded to BiV AICD; gen change 4/2008; pocket revision 10/2009; Abdominal - done on 8/22/2012 by Dr. Teena Morin Cardiac cath 08/15/1996    Patent coronaries. Elev LVEDP. EF 50-55%.  Cardiac echocardiogram 06/23/2015    Ltd study. EF 45-50%. Mild, diffuse hypk. Severe apical hypk. No mass or thrombus was clearly identified, although imaging was suboptimal.      Cardiac nuclear imaging test 06/19/2015    Fixed distal apical, distal septal defect more likely due to RV pacing than prior infarct. No ischemia. EF 46%. RWMA c/w RV pacing. Nondiagnostic EKG on pharm stress test.      Cardiovascular lower extremity venous duplex 09/04/2012    Acute, non-occlusive DVT in CFV on right. No DVT on left. No superficial thrombosis bilaterally.  Cardiovascular upper extremity venous duplex 08/27/2012    DVT in axillary vein on left. Left subclavian was not visualized.     Chronic anemia 9/5/2012    Chronic systolic heart failure (HCC)     Decreased calculated glomerular filtration rate (GFR) 3/30/2017    Calculated GFR equivalent to that of CKD stage 3 = 30-59 ml/min    Diabetic neuropathy associated with type 2 diabetes mellitus ([facility] 6/28/2011    Difficult airway for intubation 08/22/2012    see anesthesia airway note    Dyslipidemia 6/28/2011    Gout     History of complete heart block 6/28/2011    History of Coumadin therapy Anticoagulation for DVT of the LUE; Discontinued on 3/30/2017    History of deep venous thrombosis 9/5/2012    Left upper extremity    History of pyelonephritis 3/30/2017    Left bundle branch block (LBBB) on electrocardiogram 6/28/2011    Nonischemic cardiomyopathy ([facility] 6/28/2011    Obesity (BMI 35.0-39.9 without comorbidity) ([facility] 3/13/2017    Obstructive sleep apnea on CPAP 2/7/2012    Psoas abscess, right ([facility] 4/20/2017    Psoas hematoma, right, secondary to anticoagulant therapy 3/30/2017    Type 2 diabetes mellitus with diabetic neuropathy ([facility] 6/28/2011      Patient taking anticoagulants no    Patient has a defibrillator: yes    History of shots YES for example, flu, pneumonia, tetanus   Isolation History NO for example, MRSA, CDiff    ASSESSMENT:  Changes in Assessment Throughout Shift: None  Significant Changes in 24 hours (for example, RR/code, fall)  Patient has Central Line: yes Reasons if yes: Long term antibiotics  Patient has Krueger Cath: yes Reasons if yes: Urinary retention   Mobility Issues  PT  IV Patency  OR Checklist  Pending Tests    Last Vitals:  Vitals w/ MEWS Score (last day)     Date/Time MEWS Score Pulse Resp Temp BP Level of Consciousness SpO2    05/20/17 1610 1 79 18 98.4 °F (36.9 °C) 144/73 Alert 98 %    05/20/17 1259 1 92 18 97.9 °F (36.6 °C) 150/47 Alert 95 %    05/20/17 1133 -- -- -- -- -- -- 99 %    05/20/17 0904 1 86 18 98.9 °F (37.2 °C) 137/69 Alert 95 %    05/20/17 0736 -- -- -- -- -- -- 95 %    05/20/17 0412 1 77 18 98 °F (36.7 °C) 136/68 Alert 95 %    05/20/17 0000 1 77 18 97.9 °F (36.6 °C) 131/68 Alert 95 %    05/19/17 2046 -- -- -- -- -- -- 94 %    05/19/17 2000 1 78 18 97.7 °F (36.5 °C) 140/73 Alert 99 %    05/19/17 1621 1 80 18 98.6 °F (37 °C) 148/75 Alert 93 %    05/19/17 1519 -- -- -- -- -- -- 93 %    05/19/17 1234 -- -- -- -- -- -- 98 %    05/19/17 1200 1 80 18 98.6 °F (37 °C) 153/81 Alert 96 %    05/19/17 0906 1 87 18 98 °F (36.7 °C) 138/70 Alert 94 % 05/19/17 0837 -- -- -- -- -- -- 98 %    05/19/17 0418 1 74 18 98.8 °F (37.1 °C) 144/59 Alert 95 %    05/19/17 0411 1 74 18 98.6 °F (37 °C) 154/74 Alert 94 %            PAIN    Pain Assessment    Pain Intensity 1: 0 (05/20/17 1600)    Pain Location 1: Head    Pain Intervention(s) 1: Medication (see MAR)    Patient Stated Pain Goal: 0  Intervention effective: N/A  Time of last intervention: N/A Reassessment Completed: N/A  Other actions taken for pain: N/A    Last 3 Weights:  Last 3 Recorded Weights in this Encounter    05/18/17 0737 05/19/17 0918 05/20/17 1539   Weight: 117 kg (258 lb) 115.6 kg (254 lb 13.6 oz) 114.2 kg (251 lb 12.3 oz)   Weight change: -1.428 kg (-3 lb 2.4 oz)    INTAKE/OUPUT    Current Shift:      Last three shifts: 05/19 0701 - 05/20 1900  In: 150 [P.O.:150]  Out: 2000 [Urine:2000]    RECOMMENDATIONS AND DISCHARGE PLANNING  Patient needs and requests: None    Pending tests/procedures: None     Discharge plan for patient: TBD    Discharge planning Needs or Barriers: TBD    Estimated Discharge Date: TBD Posted on Whiteboard in Patients Room: yes       \"HEALS\" SAFETY CHECK  A safety check occurred in the patient's room between off going nurse and oncoming nurse listed above. The safety check included the below items:    H  High Alert Medications Verify all high alert medication drips (heparin, PCA, etc.)  E  Equipment Suction is set up for ALL patients (with yanker)  Red plugs utilized for all equipment (IV pumps, etc.)  WOWs wiped down at end of shift. Room stocked with oxygen, suction, and other unit-specific supplies  A  Alarms Bed alarm is set for fall risk patients  Ensure chair alarm is in place and activated if patient is up in a chair  L  Lines Check IV for any infiltration  Krueger bag is empty if patient has a Krueger   Tubing and IV bags are labeled  S  Safety  Room is clean, patient is clean, and equipment is clean. Hallways are clear from equipment besides carts.    Fall bracelet on for fall risk patients  Ensure room is clear and free of clutter  Suction is set up for ALL patients (with vania)  Hallways are clear from equipment besides carts.    Isolation precautions followed, supplies available outside room, sign posted    Barbara Jim RN

## 2017-05-21 ENCOUNTER — APPOINTMENT (OUTPATIENT)
Dept: GENERAL RADIOLOGY | Age: 67
DRG: 853 | End: 2017-05-21
Attending: PHYSICIAN ASSISTANT
Payer: COMMERCIAL

## 2017-05-21 LAB
ALBUMIN SERPL BCP-MCNC: 2.1 G/DL (ref 3.4–5)
ALBUMIN/GLOB SERPL: 0.5 {RATIO} (ref 0.8–1.7)
ALP SERPL-CCNC: 178 U/L (ref 45–117)
ALT SERPL-CCNC: 26 U/L (ref 13–56)
ANION GAP BLD CALC-SCNC: 5 MMOL/L (ref 3–18)
AST SERPL W P-5'-P-CCNC: 42 U/L (ref 15–37)
BACTERIA SPEC CULT: NORMAL
BACTERIA SPEC CULT: NORMAL
BASOPHILS # BLD AUTO: 0 K/UL (ref 0–0.06)
BASOPHILS # BLD: 0 % (ref 0–3)
BILIRUB DIRECT SERPL-MCNC: 0.2 MG/DL (ref 0–0.2)
BILIRUB SERPL-MCNC: 0.6 MG/DL (ref 0.2–1)
BUN SERPL-MCNC: 17 MG/DL (ref 7–18)
BUN/CREAT SERPL: 15 (ref 12–20)
CALCIUM SERPL-MCNC: 8.6 MG/DL (ref 8.5–10.1)
CHLORIDE SERPL-SCNC: 103 MMOL/L (ref 100–108)
CO2 SERPL-SCNC: 29 MMOL/L (ref 21–32)
CREAT SERPL-MCNC: 1.15 MG/DL (ref 0.6–1.3)
DIFFERENTIAL METHOD BLD: ABNORMAL
EOSINOPHIL # BLD: 0.3 K/UL (ref 0–0.4)
EOSINOPHIL NFR BLD: 2 % (ref 0–5)
ERYTHROCYTE [DISTWIDTH] IN BLOOD BY AUTOMATED COUNT: 17.3 % (ref 11.6–14.5)
GLOBULIN SER CALC-MCNC: 3.9 G/DL (ref 2–4)
GLUCOSE BLD STRIP.AUTO-MCNC: 107 MG/DL (ref 70–110)
GLUCOSE BLD STRIP.AUTO-MCNC: 114 MG/DL (ref 70–110)
GLUCOSE BLD STRIP.AUTO-MCNC: 114 MG/DL (ref 70–110)
GLUCOSE BLD STRIP.AUTO-MCNC: 122 MG/DL (ref 70–110)
GLUCOSE BLD STRIP.AUTO-MCNC: 93 MG/DL (ref 70–110)
GLUCOSE SERPL-MCNC: 111 MG/DL (ref 74–99)
HCT VFR BLD AUTO: 26.1 % (ref 35–45)
HGB BLD-MCNC: 8.5 G/DL (ref 12–16)
LYMPHOCYTES # BLD AUTO: 13 % (ref 20–51)
LYMPHOCYTES # BLD: 1.8 K/UL (ref 0.8–3.5)
MCH RBC QN AUTO: 26.2 PG (ref 24–34)
MCHC RBC AUTO-ENTMCNC: 32.6 G/DL (ref 31–37)
MCV RBC AUTO: 80.6 FL (ref 74–97)
MONOCYTES # BLD: 0.5 K/UL (ref 0–1)
MONOCYTES NFR BLD AUTO: 4 % (ref 2–9)
NEUTS BAND NFR BLD MANUAL: 2 % (ref 0–5)
NEUTS SEG # BLD: 11.1 K/UL (ref 1.8–8)
NEUTS SEG NFR BLD AUTO: 79 % (ref 42–75)
PLATELET # BLD AUTO: 207 K/UL (ref 135–420)
PLATELET COMMENTS,PCOM: ABNORMAL
PMV BLD AUTO: 8.6 FL (ref 9.2–11.8)
POTASSIUM SERPL-SCNC: 3.8 MMOL/L (ref 3.5–5.5)
PROT SERPL-MCNC: 6 G/DL (ref 6.4–8.2)
RBC # BLD AUTO: 3.24 M/UL (ref 4.2–5.3)
RBC MORPH BLD: ABNORMAL
RBC MORPH BLD: ABNORMAL
SERVICE CMNT-IMP: NORMAL
SERVICE CMNT-IMP: NORMAL
SODIUM SERPL-SCNC: 137 MMOL/L (ref 136–145)
WBC # BLD AUTO: 13.7 K/UL (ref 4.6–13.2)

## 2017-05-21 PROCEDURE — 74011250637 HC RX REV CODE- 250/637: Performed by: INTERNAL MEDICINE

## 2017-05-21 PROCEDURE — 65270000032 HC RM SEMIPRIVATE

## 2017-05-21 PROCEDURE — 71010 XR CHEST PORT: CPT

## 2017-05-21 PROCEDURE — 85025 COMPLETE CBC W/AUTO DIFF WBC: CPT | Performed by: INTERNAL MEDICINE

## 2017-05-21 PROCEDURE — 74011636637 HC RX REV CODE- 636/637: Performed by: FAMILY MEDICINE

## 2017-05-21 PROCEDURE — 74011250637 HC RX REV CODE- 250/637: Performed by: HOSPITALIST

## 2017-05-21 PROCEDURE — 74011000250 HC RX REV CODE- 250: Performed by: UROLOGY

## 2017-05-21 PROCEDURE — 74011000258 HC RX REV CODE- 258: Performed by: INTERNAL MEDICINE

## 2017-05-21 PROCEDURE — 94640 AIRWAY INHALATION TREATMENT: CPT

## 2017-05-21 PROCEDURE — 65270000029 HC RM PRIVATE

## 2017-05-21 PROCEDURE — 74011250636 HC RX REV CODE- 250/636: Performed by: INTERNAL MEDICINE

## 2017-05-21 PROCEDURE — 80048 BASIC METABOLIC PNL TOTAL CA: CPT | Performed by: INTERNAL MEDICINE

## 2017-05-21 PROCEDURE — 80076 HEPATIC FUNCTION PANEL: CPT | Performed by: INTERNAL MEDICINE

## 2017-05-21 PROCEDURE — 82962 GLUCOSE BLOOD TEST: CPT

## 2017-05-21 PROCEDURE — 36415 COLL VENOUS BLD VENIPUNCTURE: CPT | Performed by: INTERNAL MEDICINE

## 2017-05-21 PROCEDURE — 74011250636 HC RX REV CODE- 250/636: Performed by: HOSPITALIST

## 2017-05-21 RX ORDER — FAMOTIDINE 20 MG/1
20 TABLET, FILM COATED ORAL 2 TIMES DAILY
Status: DISCONTINUED | OUTPATIENT
Start: 2017-05-21 | End: 2017-05-25 | Stop reason: HOSPADM

## 2017-05-21 RX ORDER — INSULIN LISPRO 100 [IU]/ML
INJECTION, SOLUTION INTRAVENOUS; SUBCUTANEOUS
Status: DISCONTINUED | OUTPATIENT
Start: 2017-05-21 | End: 2017-05-25 | Stop reason: HOSPADM

## 2017-05-21 RX ADMIN — MIDODRINE HYDROCHLORIDE 10 MG: 2.5 TABLET ORAL at 09:49

## 2017-05-21 RX ADMIN — Medication 10 ML: at 23:05

## 2017-05-21 RX ADMIN — ACETAMINOPHEN 650 MG: 500 TABLET ORAL at 18:13

## 2017-05-21 RX ADMIN — PRAVASTATIN SODIUM 40 MG: 20 TABLET ORAL at 23:04

## 2017-05-21 RX ADMIN — Medication 10 ML: at 06:06

## 2017-05-21 RX ADMIN — IPRATROPIUM BROMIDE AND ALBUTEROL SULFATE 3 ML: 2.5; .5 SOLUTION RESPIRATORY (INHALATION) at 11:21

## 2017-05-21 RX ADMIN — MEROPENEM 1 G: 1 INJECTION, POWDER, FOR SOLUTION INTRAVENOUS at 09:48

## 2017-05-21 RX ADMIN — IPRATROPIUM BROMIDE AND ALBUTEROL SULFATE 3 ML: 2.5; .5 SOLUTION RESPIRATORY (INHALATION) at 16:55

## 2017-05-21 RX ADMIN — MEROPENEM 1 G: 1 INJECTION, POWDER, FOR SOLUTION INTRAVENOUS at 00:38

## 2017-05-21 RX ADMIN — MIDODRINE HYDROCHLORIDE 10 MG: 2.5 TABLET ORAL at 01:16

## 2017-05-21 RX ADMIN — DOCUSATE SODIUM 100 MG: 100 CAPSULE, LIQUID FILLED ORAL at 09:50

## 2017-05-21 RX ADMIN — CHOLECALCIFEROL TAB 25 MCG (1000 UNIT) 2000 UNITS: 25 TAB at 09:50

## 2017-05-21 RX ADMIN — LACTOBACILLUS TAB 1 TABLET: TAB at 18:16

## 2017-05-21 RX ADMIN — FOLIC ACID 1 MG: 1 TABLET ORAL at 09:49

## 2017-05-21 RX ADMIN — LACTOBACILLUS TAB 1 TABLET: TAB at 09:49

## 2017-05-21 RX ADMIN — FLUCONAZOLE 200 MG: 2 INJECTION INTRAVENOUS at 11:48

## 2017-05-21 RX ADMIN — Medication 10 ML: at 14:45

## 2017-05-21 RX ADMIN — FAMOTIDINE 20 MG: 20 TABLET ORAL at 09:49

## 2017-05-21 RX ADMIN — PRAVASTATIN SODIUM 40 MG: 20 TABLET ORAL at 00:41

## 2017-05-21 RX ADMIN — FAMOTIDINE 20 MG: 20 TABLET ORAL at 18:16

## 2017-05-21 RX ADMIN — POLYETHYLENE GLYCOL 3350 17 G: 17 POWDER, FOR SOLUTION ORAL at 00:41

## 2017-05-21 RX ADMIN — Medication 10 ML: at 00:42

## 2017-05-21 RX ADMIN — MIDODRINE HYDROCHLORIDE 10 MG: 2.5 TABLET ORAL at 18:15

## 2017-05-21 RX ADMIN — IPRATROPIUM BROMIDE AND ALBUTEROL SULFATE 3 ML: 2.5; .5 SOLUTION RESPIRATORY (INHALATION) at 07:12

## 2017-05-21 RX ADMIN — INSULIN GLARGINE 7 UNITS: 100 INJECTION, SOLUTION SUBCUTANEOUS at 09:52

## 2017-05-21 RX ADMIN — MEROPENEM 1 G: 1 INJECTION, POWDER, FOR SOLUTION INTRAVENOUS at 18:16

## 2017-05-21 RX ADMIN — ALLOPURINOL 50 MG: 100 TABLET ORAL at 09:50

## 2017-05-21 RX ADMIN — ONDANSETRON 4 MG: 2 INJECTION INTRAMUSCULAR; INTRAVENOUS at 14:45

## 2017-05-21 NOTE — PROGRESS NOTES
[facility]ist [facility]  Progress Note    Patient: Nolan Goss Age: 77 y.o. : 1950 MR#: [id number] SSN: [id number]  Date/Time: 2017     Subjective:     Patient in NAD, awake, follows commands. Daughter at bedside    Assessment/Plan:   1. Sepsis due to infected R psoas hematoma/epidural abscess - with neurologic compromise, paraplegia. Is s/p CT-guided drainage catheter, old drain removal on , growing grp B beta-hemo Strep. Antibiotics per ID  2. Acute paraplegia due to ?spinal cord infarct/?septic thrombophlebitis - PT, OT   3. LISBETH with ATN on CKDz stage 3 - due to neurogenic bladder. Intermittent cath. Monitor . 5. DM - SSI, monitor  6. HTN - monitor. 7. Dyslipidemia - statin. 8. Elevated trop I - due to demand ischemia, #1.  9. BLE DVT - s/p IVCFilter placement . No OAC due to #1/hematoma. 10. Severe prt-shelton malnutrition - supplements  11. Grp B beta-hemo Strep bacteremia - due to #1. Cultures reviewed. Most recent 5/15 blood cx NGTD,  with 3/4 bottles Staph epi. 12. Aspiration PNA vs HCAPNA -on merrem per ID  13. Possible candida cystitis - fluconazole.   14. Anemia- monitor    Additional Notes:      Case discussed with:  [x]Patient  [x]Family  []Nursing  []Case Management  DVT Prophylaxis:  []Lovenox  []Hep SQ  [x]SCDs  []Coumadin   []On Heparin gtt    Objective:   VS:   Visit Vitals    /75 (BP 1 Location: Left arm, BP Patient Position: At rest)    Pulse 79    Temp 98.5 °F (36.9 °C)    Resp 18    Ht 5' 7\" (1.702 m)    Wt 114.2 kg (251 lb 12.3 oz)    SpO2 99%    Breastfeeding No    BMI 39.43 kg/m2      Tmax/24hrs: Temp (24hrs), Av.1 °F (36.7 °C), Min:97.7 °F (36.5 °C), Max:98.5 °F (36.9 °C)      Intake/Output Summary (Last 24 hours) at 17 1456  Last data filed at 17 1148   Gross per 24 hour   Intake              150 ml   Output                1 ml   Net              149 ml       General:  Awake, follows commands, responds appropriately  Cardiovascular:  S1S2+, RRR  Pulmonary:  CTA b/l  GI:  Soft, BS+, NT, ND, obese  Extremities: trace edema      Labs:    Recent Results (from the past 24 hour(s))   GLUCOSE, POC    Collection Time: 05/20/17  4:53 PM   Result Value Ref Range    Glucose (POC) 137 (H) 70 - 110 mg/dL   GLUCOSE, POC    Collection Time: 05/20/17 10:21 PM   Result Value Ref Range    Glucose (POC) 128 (H) 70 - 110 mg/dL   HEPATIC FUNCTION PANEL    Collection Time: 05/21/17  5:11 AM   Result Value Ref Range    Protein, total 6.0 (L) 6.4 - 8.2 g/dL    Albumin 2.1 (L) 3.4 - 5.0 g/dL    Globulin 3.9 2.0 - 4.0 g/dL    A-G Ratio 0.5 (L) 0.8 - 1.7      Bilirubin, total 0.6 0.2 - 1.0 MG/DL    Bilirubin, direct 0.2 0.0 - 0.2 MG/DL    Alk. phosphatase 178 (H) 45 - 117 U/L    AST (SGOT) 42 (H) 15 - 37 U/L    ALT (SGPT) 26 13 - 56 U/L   CBC WITH AUTOMATED DIFF    Collection Time: 05/21/17  5:11 AM   Result Value Ref Range    WBC 13.7 (H) 4.6 - 13.2 K/uL    RBC 3.24 (L) 4.20 - 5.30 M/uL    HGB 8.5 (L) 12.0 - 16.0 g/dL    HCT 26.1 (L) 35.0 - 45.0 %    MCV 80.6 74.0 - 97.0 FL    MCH 26.2 24.0 - 34.0 PG    MCHC 32.6 31.0 - 37.0 g/dL    RDW 17.3 (H) 11.6 - 14.5 %    PLATELET 404 478 - 161 K/uL    MPV 8.6 (L) 9.2 - 11.8 FL    NEUTROPHILS 79 (H) 42 - 75 %    BAND NEUTROPHILS 2 0 - 5 %    LYMPHOCYTES 13 (L) 20 - 51 %    MONOCYTES 4 2 - 9 %    EOSINOPHILS 2 0 - 5 %    BASOPHILS 0 0 - 3 %    ABS. NEUTROPHILS 11.1 (H) 1.8 - 8.0 K/UL    ABS. LYMPHOCYTES 1.8 0.8 - 3.5 K/UL    ABS. MONOCYTES 0.5 0 - 1.0 K/UL    ABS. EOSINOPHILS 0.3 0.0 - 0.4 K/UL    ABS.  BASOPHILS 0.0 0.0 - 0.06 K/UL    DF MANUAL      PLATELET COMMENTS ADEQUATE PLATELETS      RBC COMMENTS ANISOCYTOSIS  1+        RBC COMMENTS STOMATOCYTES  1+       METABOLIC PANEL, BASIC    Collection Time: 05/21/17  5:11 AM   Result Value Ref Range    Sodium 137 136 - 145 mmol/L    Potassium 3.8 3.5 - 5.5 mmol/L    Chloride 103 100 - 108 mmol/L    CO2 29 21 - 32 mmol/L    Anion gap 5 3.0 - 18 mmol/L    Glucose 111 (H) 74 - 99 mg/dL    BUN 17 7.0 - 18 MG/DL    Creatinine 1.15 0.6 - 1.3 MG/DL    BUN/Creatinine ratio 15 12 - 20      GFR est AA 57 (L) >60 ml/min/1.73m2    GFR est non-AA 47 (L) >60 ml/min/1.73m2    Calcium 8.6 8.5 - 10.1 MG/DL   GLUCOSE, POC    Collection Time: 05/21/17  6:02 AM   Result Value Ref Range    Glucose (POC) 114 (H) 70 - 110 mg/dL   GLUCOSE, POC    Collection Time: 05/21/17  9:06 AM   Result Value Ref Range    Glucose (POC) 122 (H) 70 - 110 mg/dL   GLUCOSE, POC    Collection Time: 05/21/17 11:02 AM   Result Value Ref Range    Glucose (POC) 114 (H) 70 - 110 mg/dL       Signed By: Wil Fair MD     May 21, 2017

## 2017-05-21 NOTE — PROGRESS NOTES
[facility]  Pulmonary, Critical Care, and Sleep Medicine    Name: Jerod Luo MRN: [id number]   : 1950 Hospital: [facility]   Date: 2017        Subjective/Interval History:     Patient is a 77 y.o. female w/hx of HTN, CHF, heart block s/p pacer, multiple vascular procedures in neck veins with residual stenoses, prior Hx of DVT, DM, Afib, recent admission for Rt psoas hematoma. Was readmitted on 17 for acute paraplegia, fevers, sweats. Repeat CT demonstrated increased size of psoas fluid collection. Labs notable for leukocytosis and LISBETH. Pt brought to IR and 300ml of pus aspirated. Hypotensive upon return to floor, and still so after ~1500 ml IVF. Pt was transferred to ICU on 17 for hypotension s/p procedure; recovered with pressor support and fluids. Pt was then transferred out of ICU floor. Last seen by Nicky Rangel - 17: Pulmonary consult team re-consulted for SOB x2-3 days without clear cause - cardiac enzymes (-);  VQ scan - low probability for PE; PVL's (+) DVT in R Common Femoral Vein & L popliteal vein. Pt then underwent emergent IVC placement d/t inability to anticoagulate using heparin 2/2 hx Psoas muscle hematoma. 05/15/17: Transferred to ICU for altered mental status & Hypotension  17: Transferred back to Floor - 4N    17:   - Alert, awake, resting comfortably RA; NAD Slept better last night but still not great  - SOB still present but states but continues to improve; still c/o some chest tightness with her breathing treatments and when moving her upper extremities- states she just feels weak - improving  - Overall, appears improved  - Still not eating great, but improving PO intake  - Denies any substernal CP, palpitations, abd pain, N/V/D, F/C, H/A  Left leg still Numb    ROS:Pertinent items are noted in HPI.     Objective:   Vital Signs:    Visit Vitals    /73 (BP 1 Location: Left arm, BP Patient Position: At rest)    Pulse 77    Temp 98 °F (36.7 °C)    Resp 18    Ht 5' 7\" (1.702 m)    Wt 114.2 kg (251 lb 12.3 oz)    SpO2 99%    Breastfeeding No    BMI 39.43 kg/m2       O2 Device: Nasal cannula   O2 Flow Rate (L/min): 3 l/min   Temp (24hrs), Av °F (36.7 °C), Min:97.7 °F (36.5 °C), Max:98.4 °F (36.9 °C)       Intake/Output:   Last shift:         Last 3 shifts: 1901 -  0700  In: -   Out: 500 [Urine:500]  No intake or output data in the 24 hours ending 17 0936     Physical Exam:    General: Alert, awake, resting comfortably on 2LPM NC   HEENT: Anicteric sclerae; pink palpebral conjunctivae; mucosa moist   Resp: Symmetrical chest rise; mod AE bilat; diminished bibasilar; CTAB; no wheezes/rhonchi/rales noted. CV: RRR, S1S2 normal; No m/r/g   Abdomen: Soft, non-tender; (+) B. S x4; No organomegaly   Extremity: +1 BLE edema; No cyanosis/clubbing noted; peripheral pulses 2+ and symmetrical   Neuro: Alert, non-focal   Skin: Warm, dry; No rashes/lesions noted. PICC L upper arm. DATA:  Labs:  Recent Labs      17   0511  17   0323  17   0335   WBC  13.7*  13.0  11.1   HGB  8.5*  8.4*  8.1*   HCT  26.1*  25.9*  25.0*   PLT  207  226  246     Recent Labs      17   0511  17   0323  17   0335   NA  137  138  139   K  3.8  3.4*  3.8   CL  103  104  105   CO2  29  29  28   GLU  111*  126*  100*   BUN  17  21*  22*   CREA  1.15  1.45*  1.72*   CA  8.6  8.7  8.5   MG   --   1.8  1.7   ALB  2.1*  2.2*  2.2*   SGOT  42*  46*  50*   ALT  26  27  27       PFT:   N/A                                                   Echo [17]:  SUMMARY:  Left ventricle: Size was at the upper limits of normal. Systolic function  was mildly reduced by visual assessment. Ejection fraction was estimated  to be 45 %. There was severe hypokinesis of the basal-mid inferoseptal,  apical septal, and apical wall(s).  Structure noted in the apex as  mentioned on previous studies. Possible muscle band vs fibrinous mass. Imaging:  No new imaging    CT ABD PELV WO CONT [05/16/17]: IMPRESSION:  1. Smaller right psoas muscle implying smaller intramuscular fluid collection and less edema/inflammation. Draining catheter is still in place. Limited evaluation without IV contrast. Slightly smaller presacral fluid with no discrete collection. 2. Less severe left lower lobe pulmonary consolidation but more severe right lower lobe consolidation. No pleural effusion. 3. Worsening anasarca/subcutaneous edema on both sides. 4. More fluid in the ascending and transverse colon, colitis or diarrheal disease? [x]I have personally reviewed the patients radiographs  [x]Radiographs reviewed with radiologist   [x]No change from prior, tubes and lines in adequate position  []Improved   [x]Worsening    IMPRESSION:   · R sided Pleural effusion - Slightly worse on CT (05/16/17) - will con't to monitor  · (+) DVT -  R Common Femoral Vein and L popliteal vein; s/p IVC filter. V/Q scan on 5/11/17: low probability. Small PE is possible but doubtful currently and given current clinical status not a candidate for systemic anticoagulation. · Hypoxia d/t atelectasis - Essentially resolved - RA   · Atypical Chest pain- doubt cardiac or pulmonary etiology. ?? Diaphragmatic irritation from abdominally placed pacemaker? ? Vs GI/ esophageal etiology. - Improving   · Sepsis - Initially 2/2 infected psoas hematoma/epidural abscess (GAS) with neurological compromise- now with GPC in blood and right-sided infiltrate. ? PICC infection, ? UTI, ? HCAP, pacer infection, fungemia. Also has new LUQ pain (?splenic abscess).    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, resulting in spinal cord infarct- stable  · Enterococcus faecalis in urine (gent susceptible)  · Bacteremia · Infected pacer pocket  · Psoas hematoma  · LISBETH - D/t neurogenic bladder and component of ATN  · NSTEMI   · Hx of CHF and Afib  · Prior complicated pacer placement (apparently upper extremity and central vein stenoses)  · Inability to obtain MRI (pacer)     Patient Active Problem List   Diagnosis Code    Nonischemic cardiomyopathy ([facility] I42.8    History of complete heart block Z86.79    Biventricular implantable cardioverter-defibrillator in situ Z95.810    Left bundle branch block (LBBB) on electrocardiogram I44.7    Type 2 diabetes mellitus with diabetic neuropathy (HCC) E11.40    Dyslipidemia E78.5    Diabetic neuropathy associated with type 2 diabetes mellitus ([facility] E11.40    Obstructive sleep apnea on CPAP G47.33, Z99.89    AICD generator infection ([facility] T82. 7XXA    Difficult airway for intubation T88. 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 1XXA    Impaired mobility and ADLs Z74.09    History of Coumadin therapy Z79.01    Gout M10.9    Acute paraplegia (HCC) G82.20    Sepsis ([facility] A41.9    Psoas abscess, right ([facility] K68.12    Krueger catheter in place on admission Z96.0    Urinary tract infection due to Enterococcus N39.0, B95.2    Group B streptococcal infection A49.1      PLAN:   · SpO2 goal >92%; titrate supp O2 PRN; currently resting comfortably onRA -   · Aspiration precautions, HOB >30'  · Will repeat CXR / ABG if respiratory status worsens or mental status changes dramatically  · Pulmonary toileting; encourage ICS; con't EZ Pap with duo-nebs q4 while awake; pt still not taking very deep breaths - con't to encourage pt on deep breathing to help expand lungs. · No indication for steroids at this time  · Afebrile; aleukocytosis; normotensive; will monitor s/s for infection; current ABX: Merrem q12; Diflucan 200mg q24 hrs.    · Encourage pt to eat; PT/OT  · Will con't to monitor closely - Low threshold for decompensation   · DVT/PUD Prophylaxis per primary        Hill Quiroz PA-C

## 2017-05-21 NOTE — ROUTINE PROCESS
Bedside and Verbal shift change report given to Cynthia Maldonado RN (oncoming nurse) by Mary Gaxiola RN (offgoing nurse). Report included the following information SBAR, Kardex, MAR and Recent Results. SITUATION:  Code Status: Full Code  Reason for Admission: Acute paraplegia  Acute paraplegia [facility])  Hospital day: 31  Problem List:       Hospital Problems  Date Reviewed: 4/20/2017          Codes Class Noted POA    * (Principal)Acute paraplegia ([facility] [address] ICD-10-CM: G82.20  ICD-9-CM: 344.1  4/20/2017 Yes        Sepsis ([facility] [address] ICD-10-CM: A41.9  ICD-9-CM: 038.9, 995.91  4/20/2017 Yes        Psoas abscess, right ([facility] [address] ICD-10-CM: D14.52  ICD-9-CM: 567.31  4/20/2017 Yes        Krueger catheter in place on admission ICD-10-CM: Z96.0  ICD-9-CM: V45.89  4/20/2017 Yes        Urinary tract infection due to Enterococcus ICD-10-CM: N39.0, B95.2  ICD-9-CM: 599.0, 041.04  4/20/2017 Yes        Group B streptococcal infection ICD-10-CM: A49.1  ICD-9-CM: 041.02  4/20/2017 Yes        History of Coumadin therapy ICD-10-CM: Z79.01  ICD-9-CM: V58.61  Unknown Yes    Overview Signed 4/6/2017 10:35 PM by Kristine Felipe MD     Anticoagulation for chronic atrial fibrillation; Discontinued on 3/30/2017             Decreased calculated glomerular filtration rate (GFR) ICD-10-CM: R94.4  ICD-9-CM: 794.4  3/30/2017 Yes    Overview Signed 4/6/2017  5:47 PM by Kristine Felipe MD     Calculated GFR equivalent to that of CKD stage 3 = 30-59 ml/min             Iliopsoas muscle hematoma ICD-10-CM: S70.10XA  ICD-9-CM: 924.00  3/30/2017 Yes        Psoas hematoma, right, secondary to anticoagulant therapy ICD-10-CM: S30. 1XXA  ICD-9-CM: 924.9, E934.2  3/30/2017 Yes        Impaired mobility and ADLs ICD-10-CM: Z74.09  ICD-9-CM: 799.89  3/30/2017 Yes        Benign hypertensive heart disease with systolic CHF, NYHA class 2 (HCC) (Chronic) ICD-10-CM: I11.0, I50.20  ICD-9-CM: 402.11, 428.20, 428.0  9/5/2012 Yes        [facility] generator infection ([facility] ICD-10-CM: T82. 7XXA  ICD-9-CM: 996.61  8/20/2012 Yes    Overview Signed 8/20/2012  6:13 PM by Queta Grier MD     S/p explant 4 leads 8/20/12             Type 2 diabetes mellitus with diabetic neuropathy (HCC) (Chronic) ICD-10-CM: E11.40  ICD-9-CM: 250.60, 357.2  6/28/2011 Yes              BACKGROUND:   Past Medical History:   Past Medical History:   Diagnosis Date    Acute paraplegia ([facility] [address] 4/20/2017    Benign hypertensive heart disease with systolic CHF, NYHA class 2 ([facility] [address] 9/5/2012    Biventricular implantable cardioverter-defibrillator in situ 04/28/2005    Upgraded to BiV AICD; gen change 4/2008; pocket revision 10/2009; Abdominal - done on 8/22/2012 by Dr. Jac Robert Cardiac cath 08/15/1996    Patent coronaries. Elev LVEDP. EF 50-55%.  Cardiac echocardiogram 06/23/2015    Ltd study. EF 45-50%. Mild, diffuse hypk. Severe apical hypk. No mass or thrombus was clearly identified, although imaging was suboptimal.      Cardiac nuclear imaging test 06/19/2015    Fixed distal apical, distal septal defect more likely due to RV pacing than prior infarct. No ischemia. EF 46%. RWMA c/w RV pacing. Nondiagnostic EKG on pharm stress test.      Cardiovascular lower extremity venous duplex 09/04/2012    Acute, non-occlusive DVT in CFV on right. No DVT on left. No superficial thrombosis bilaterally.  Cardiovascular upper extremity venous duplex 08/27/2012    DVT in axillary vein on left. Left subclavian was not visualized.     Chronic anemia 9/5/2012    Chronic systolic heart failure (HCC)     Decreased calculated glomerular filtration rate (GFR) 3/30/2017    Calculated GFR equivalent to that of CKD stage 3 = 30-59 ml/min    Diabetic neuropathy associated with type 2 diabetes mellitus ([facility] [address] 6/28/2011    Difficult airway for intubation 08/22/2012    see anesthesia airway note    Dyslipidemia 6/28/2011    Gout     History of complete heart block 6/28/2011    History of Coumadin therapy Anticoagulation for DVT of the LUE; Discontinued on 3/30/2017    History of deep venous thrombosis 9/5/2012    Left upper extremity    History of pyelonephritis 3/30/2017    Left bundle branch block (LBBB) on electrocardiogram 6/28/2011    Nonischemic cardiomyopathy ([facility] 6/28/2011    Obesity (BMI 35.0-39.9 without comorbidity) ([facility] 3/13/2017    Obstructive sleep apnea on CPAP 2/7/2012    Psoas abscess, right ([facility] 4/20/2017    Psoas hematoma, right, secondary to anticoagulant therapy 3/30/2017    Type 2 diabetes mellitus with diabetic neuropathy ([facility] 6/28/2011      Patient taking anticoagulants no    Patient has a defibrillator: yes    History of shots YES for example, flu, pneumonia, tetanus   Isolation History NO for example, MRSA, CDiff    ASSESSMENT:  Changes in Assessment Throughout Shift: None  Significant Changes in 24 hours (for example, RR/code, fall)  Patient has Central Line: yes Reasons if yes: Long term antibiotics  Patient has Krueger Cath: yes Reasons if yes: Urinary retention   Mobility Issues  PT  IV Patency  OR Checklist  Pending Tests    Last Vitals:  Vitals w/ MEWS Score (last day)     Date/Time MEWS Score Pulse Resp Temp BP Level of Consciousness SpO2    05/21/17 0450 1 84 16 98.1 °F (36.7 °C) 151/81 Alert 98 %    05/20/17 2105 1 75 16 97.7 °F (36.5 °C) 141/73 Alert --    05/20/17 1946 -- -- -- -- -- -- 98 %    05/20/17 1610 1 79 18 98.4 °F (36.9 °C) 144/73 Alert 98 %    05/20/17 1259 1 92 18 97.9 °F (36.6 °C) 150/47 Alert 95 %    05/20/17 1133 -- -- -- -- -- -- 99 %    05/20/17 0904 1 86 18 98.9 °F (37.2 °C) 137/69 Alert 95 %    05/20/17 0736 -- -- -- -- -- -- 95 %    05/20/17 0412 1 77 18 98 °F (36.7 °C) 136/68 Alert 95 %    05/20/17 0000 1 77 18 97.9 °F (36.6 °C) 131/68 Alert 95 %            PAIN    Pain Assessment    Pain Intensity 1: 0 (05/21/17 0050)    Pain Location 1: Head    Pain Intervention(s) 1: Medication (see MAR)    Patient Stated Pain Goal: 0  Intervention effective: N/A  Time of last intervention: N/A Reassessment Completed: N/A  Other actions taken for pain: N/A    Last 3 Weights:  Last 3 Recorded Weights in this Encounter    05/18/17 0737 05/19/17 0918 05/20/17 1539   Weight: 117 kg (258 lb) 115.6 kg (254 lb 13.6 oz) 114.2 kg (251 lb 12.3 oz)   Weight change: -1.4 kg (-3 lb 1.4 oz)    INTAKE/OUPUT    Current Shift:      Last three shifts: 05/19 0701 - 05/20 1900  In: 150 [P.O.:150]  Out: 2000 [Urine:2000]    RECOMMENDATIONS AND DISCHARGE PLANNING  Patient needs and requests: None    Pending tests/procedures: None     Discharge plan for patient: TBD    Discharge planning Needs or Barriers: TBD    Estimated Discharge Date: TBD Posted on Whiteboard in Patients Room: yes       \"HEALS\" SAFETY CHECK  A safety check occurred in the patient's room between off going nurse and oncoming nurse listed above. The safety check included the below items:    H  High Alert Medications Verify all high alert medication drips (heparin, PCA, etc.)  E  Equipment Suction is set up for ALL patients (with vania)  Red plugs utilized for all equipment (IV pumps, etc.)  WOWs wiped down at end of shift. Room stocked with oxygen, suction, and other unit-specific supplies  A  Alarms Bed alarm is set for fall risk patients  Ensure chair alarm is in place and activated if patient is up in a chair  L  Lines Check IV for any infiltration  Krueger bag is empty if patient has a Krueger   Tubing and IV bags are labeled  S  Safety  Room is clean, patient is clean, and equipment is clean. Hallways are clear from equipment besides carts. Fall bracelet on for fall risk patients  Ensure room is clear and free of clutter  Suction is set up for ALL patients (with vania)  Hallways are clear from equipment besides carts.    Isolation precautions followed, supplies available outside room, sign posted    Alfa Tellez RN

## 2017-05-21 NOTE — ROUTINE PROCESS
Bedside and Verbal shift change report given to Mayda Garcia (oncoming nurse) by Josh Moya RN (offgoing nurse). Report included the following information SBAR, Kardex, MAR and Recent Results. SITUATION:    Code Status: Full Code   Reason for Admission: Acute paraplegia   Acute paraplegia [facility] day: 32   Problem List:       Hospital Problems  Date Reviewed: 4/20/2017          Codes Class Noted POA    * (Principal)Acute paraplegia ([facility] [address] ICD-10-CM: G82.20  ICD-9-CM: 344.1  4/20/2017 Yes        Sepsis ([facility] [address] ICD-10-CM: A41.9  ICD-9-CM: 038.9, 995.91  4/20/2017 Yes        Psoas abscess, right ([facility] [address] ICD-10-CM: W51.32  ICD-9-CM: 567.31  4/20/2017 Yes        Krueger catheter in place on admission ICD-10-CM: Z96.0  ICD-9-CM: V45.89  4/20/2017 Yes        Urinary tract infection due to Enterococcus ICD-10-CM: N39.0, B95.2  ICD-9-CM: 599.0, 041.04  4/20/2017 Yes        Group B streptococcal infection ICD-10-CM: A49.1  ICD-9-CM: 041.02  4/20/2017 Yes        History of Coumadin therapy ICD-10-CM: Z79.01  ICD-9-CM: V58.61  Unknown Yes    Overview Signed 4/6/2017 10:35 PM by Forrest Wu MD     Anticoagulation for chronic atrial fibrillation; Discontinued on 3/30/2017             Decreased calculated glomerular filtration rate (GFR) ICD-10-CM: R94.4  ICD-9-CM: 794.4  3/30/2017 Yes    Overview Signed 4/6/2017  5:47 PM by Forrest Wu MD     Calculated GFR equivalent to that of CKD stage 3 = 30-59 ml/min             Iliopsoas muscle hematoma ICD-10-CM: S70.10XA  ICD-9-CM: 924.00  3/30/2017 Yes        Psoas hematoma, right, secondary to anticoagulant therapy ICD-10-CM: S30. 1XXA  ICD-9-CM: 924.9, E934.2  3/30/2017 Yes        Impaired mobility and ADLs ICD-10-CM: Z74.09  ICD-9-CM: 799.89  3/30/2017 Yes        Benign hypertensive heart disease with systolic CHF, NYHA class 2 (HCC) (Chronic) ICD-10-CM: I11.0, I50.20  ICD-9-CM: 402.11, 428.20, 428.0  9/5/2012 Yes        AICD generator infection ([facility] [address] ICD-10-CM: T82. 7XXA  ICD-9-CM: 996.61  8/20/2012 Yes    Overview Signed 8/20/2012  6:13 PM by Torey Hernández MD     S/p explant 4 leads 8/20/12             Type 2 diabetes mellitus with diabetic neuropathy (HCC) (Chronic) ICD-10-CM: E11.40  ICD-9-CM: 250.60, 357.2  6/28/2011 Yes              BACKGROUND:    Past Medical History:   Past Medical History:   Diagnosis Date    Acute paraplegia ([facility] [address] 4/20/2017    Benign hypertensive heart disease with systolic CHF, NYHA class 2 ([place] [address] 9/5/2012    Biventricular implantable cardioverter-defibrillator in situ 04/28/2005    Upgraded to BiV AICD; gen change 4/2008; pocket revision 10/2009; Abdominal - done on 8/22/2012 by Dr. Daisy Hensley Cardiac cath 08/15/1996    Patent coronaries. Elev LVEDP. EF 50-55%.  Cardiac echocardiogram 06/23/2015    Ltd study. EF 45-50%. Mild, diffuse hypk. Severe apical hypk. No mass or thrombus was clearly identified, although imaging was suboptimal.      Cardiac nuclear imaging test 06/19/2015    Fixed distal apical, distal septal defect more likely due to RV pacing than prior infarct. No ischemia. EF 46%. RWMA c/w RV pacing. Nondiagnostic EKG on pharm stress test.      Cardiovascular lower extremity venous duplex 09/04/2012    Acute, non-occlusive DVT in CFV on right. No DVT on left. No superficial thrombosis bilaterally.  Cardiovascular upper extremity venous duplex 08/27/2012    DVT in axillary vein on left. Left subclavian was not visualized.     Chronic anemia 9/5/2012    Chronic systolic heart failure (HCC)     Decreased calculated glomerular filtration rate (GFR) 3/30/2017    Calculated GFR equivalent to that of CKD stage 3 = 30-59 ml/min    Diabetic neuropathy associated with type 2 diabetes mellitus ([facility] [address] 6/28/2011    Difficult airway for intubation 08/22/2012    see anesthesia airway note    Dyslipidemia 6/28/2011    Gout     History of complete heart block 6/28/2011    History of Coumadin therapy Anticoagulation for DVT of the LUE; Discontinued on 3/30/2017    History of deep venous thrombosis 9/5/2012    Left upper extremity    History of pyelonephritis 3/30/2017    Left bundle branch block (LBBB) on electrocardiogram 6/28/2011    Nonischemic cardiomyopathy ([facility] [address] 6/28/2011    Obesity (BMI 35.0-39.9 without comorbidity) ([facility] [address] 3/13/2017    Obstructive sleep apnea on CPAP 2/7/2012    Psoas abscess, right ([facility] [address] 4/20/2017    Psoas hematoma, right, secondary to anticoagulant therapy 3/30/2017    Type 2 diabetes mellitus with diabetic neuropathy ([facility] [address] 6/28/2011         Patient taking anticoagulants no     ASSESSMENT:    Changes in Assessment Throughout Shift: no     Patient has Central Line: yes Reasons if yes: poor veins   Patient has Krueger Cath: yes Reasons if yes: retention      Last Vitals:     Vitals:    05/21/17 1122 05/21/17 1149 05/21/17 1608 05/21/17 1656   BP:  155/75 153/80    Pulse:  79 75    Resp:  18 19    Temp:  98.5 °F (36.9 °C) 98.1 °F (36.7 °C)    TempSrc:       SpO2: 99% 99% 100% 100%   Weight:       Height:            IV and DRAINS (will only show if present)   Drain 8.5 FR Right psoas muscle drain 05/10/17 Right Other (comment)-Site Assessment: Clean, dry, & intact  [REMOVED] Drain 04/20/17 Right Other (comment)-Site Assessment: Clean, dry, & intact  [REMOVED] Peripheral IV 04/20/17 Left Arm-Site Assessment: Clean, dry, & intact  [REMOVED] Peripheral IV 04/20/17 Right Antecubital-Site Assessment: Clean, dry, & intact  [REMOVED] Triple Lumen Triple Lumen CVL 04/20/17 Left Other(comment)-Site Assessment: Clean, dry, & intact  [REMOVED] Peripheral IV 04/21/17 Left Arm-Site Assessment: Clean, dry, & intact  [REMOVED] Peripheral IV 04/25/17 Left Forearm-Site Assessment: Clean, dry, & intact  PICC Double Lumen 57/99/14 Right;Basilic-Site Assessment: Clean, dry, & intact     WOUND (if present)   Wound Type:  none   Dressing present: yes sacrum   Wound Concerns/Notes: none     PAIN    Pain Assessment    Pain Intensity 1: 3 (05/21/17 1909)    Pain Location 1: Shoulder    Pain Intervention(s) 1: Medication (see MAR)    Patient Stated Pain Goal: 0  o Interventions for Pain:  tylenol  o Intervention effective: yes  o Time of last intervention: 1813   o Reassessment Completed: yes      Last 3 Weights:  Last 3 Recorded Weights in this Encounter    05/18/17 0737 05/19/17 0918 05/20/17 1539   Weight: 117 kg (258 lb) 115.6 kg (254 lb 13.6 oz) 114.2 kg (251 lb 12.3 oz)     Weight change: -1.4 kg (-3 lb 1.4 oz)     INTAKE/OUPUT    Current Shift:      Last three shifts: 05/20 0701 - 05/21 1900  In: 200 [I.V.:200]  Out: 2351 [Urine:2350]     LAB RESULTS     Recent Labs      05/21/17   0511  05/20/17   0323  05/19/17   0335   WBC  13.7*  13.0  11.1   HGB  8.5*  8.4*  8.1*   HCT  26.1*  25.9*  25.0*   PLT  207  226  246        Recent Labs      05/21/17   0511  05/20/17   0323  05/19/17   0335   NA  137  138  139   K  3.8  3.4*  3.8   GLU  111*  126*  100*   BUN  17  21*  22*   CREA  1.15  1.45*  1.72*   CA  8.6  8.7  8.5   MG   --   1.8  1.7       RECOMMENDATIONS AND DISCHARGE PLANNING     1. Pending tests/procedures/ Plan of Care or Other Needs: Labs     2. Discharge plan for patient and Needs/Barriers: Rehab    3. Estimated Discharge Date: TBD Posted on Whiteboard in Patients Room: no      4. The patient's care plan was reviewed with the oncoming nurse. \"HEALS\" SAFETY CHECK      Fall Risk    Total Score: 3    Safety Measures: Safety Measures: Bed/Chair alarm on, Bed/Chair-Wheels locked, Bed in low position, Call light within reach, Side rails X 3    A safety check occurred in the patient's room between off going nurse and oncoming nurse listed above.     The safety check included the below items  Area Items   H  High Alert Medications - Verify all high alert medication drips (heparin, PCA, etc.)   E  Equipment - Suction is set up for ALL patients (with vania)  - Red plugs utilized for all equipment (IV pumps, etc.)  - WOWs wiped down at end of shift.  - Room stocked with oxygen, suction, and other unit-specific supplies   A  Alarms - Bed alarm is set for fall risk patients  - Ensure chair alarm is in place and activated if patient is up in a chair   L  Lines - Check IV for any infiltration  - Krueger bag is empty if patient has a Krueger   - Tubing and IV bags are labeled   S  Safety   - Room is clean, patient is clean, and equipment is clean. - Hallways are clear from equipment besides carts. - Fall bracelet on for fall risk patients  - Ensure room is clear and free of clutter  - Suction is set up for ALL patients (with yanker)  - Hallways are clear from equipment besides carts.    - Isolation precautions followed, supplies available outside room, sign posted     Dakota Jorge RN

## 2017-05-22 LAB
ALBUMIN SERPL BCP-MCNC: 2.1 G/DL (ref 3.4–5)
ALBUMIN/GLOB SERPL: 0.5 {RATIO} (ref 0.8–1.7)
ALP SERPL-CCNC: 168 U/L (ref 45–117)
ALT SERPL-CCNC: 22 U/L (ref 13–56)
ANION GAP BLD CALC-SCNC: 8 MMOL/L (ref 3–18)
AST SERPL W P-5'-P-CCNC: 39 U/L (ref 15–37)
BASOPHILS # BLD AUTO: 0 K/UL (ref 0–0.06)
BASOPHILS # BLD: 0 % (ref 0–3)
BILIRUB DIRECT SERPL-MCNC: 0.2 MG/DL (ref 0–0.2)
BILIRUB SERPL-MCNC: 0.6 MG/DL (ref 0.2–1)
BUN SERPL-MCNC: 14 MG/DL (ref 7–18)
BUN/CREAT SERPL: 13 (ref 12–20)
CALCIUM SERPL-MCNC: 8.5 MG/DL (ref 8.5–10.1)
CHLORIDE SERPL-SCNC: 102 MMOL/L (ref 100–108)
CO2 SERPL-SCNC: 29 MMOL/L (ref 21–32)
CREAT SERPL-MCNC: 1.08 MG/DL (ref 0.6–1.3)
DIFFERENTIAL METHOD BLD: ABNORMAL
EOSINOPHIL # BLD: 0.5 K/UL (ref 0–0.4)
EOSINOPHIL NFR BLD: 4 % (ref 0–5)
ERYTHROCYTE [DISTWIDTH] IN BLOOD BY AUTOMATED COUNT: 17.3 % (ref 11.6–14.5)
GLOBULIN SER CALC-MCNC: 3.9 G/DL (ref 2–4)
GLUCOSE BLD STRIP.AUTO-MCNC: 115 MG/DL (ref 70–110)
GLUCOSE BLD STRIP.AUTO-MCNC: 87 MG/DL (ref 70–110)
GLUCOSE BLD STRIP.AUTO-MCNC: 89 MG/DL (ref 70–110)
GLUCOSE BLD STRIP.AUTO-MCNC: 94 MG/DL (ref 70–110)
GLUCOSE SERPL-MCNC: 79 MG/DL (ref 74–99)
HCT VFR BLD AUTO: 26 % (ref 35–45)
HGB BLD-MCNC: 8.4 G/DL (ref 12–16)
LYMPHOCYTES # BLD AUTO: 13 % (ref 20–51)
LYMPHOCYTES # BLD: 1.5 K/UL (ref 0.8–3.5)
MCH RBC QN AUTO: 25.9 PG (ref 24–34)
MCHC RBC AUTO-ENTMCNC: 32.3 G/DL (ref 31–37)
MCV RBC AUTO: 80.2 FL (ref 74–97)
MONOCYTES # BLD: 0.2 K/UL (ref 0–1)
MONOCYTES NFR BLD AUTO: 2 % (ref 2–9)
NEUTS BAND NFR BLD MANUAL: 1 % (ref 0–5)
NEUTS SEG # BLD: 9.1 K/UL (ref 1.8–8)
NEUTS SEG NFR BLD AUTO: 80 % (ref 42–75)
PLATELET # BLD AUTO: 188 K/UL (ref 135–420)
PLATELET COMMENTS,PCOM: ABNORMAL
PMV BLD AUTO: 8.2 FL (ref 9.2–11.8)
POTASSIUM SERPL-SCNC: 3.6 MMOL/L (ref 3.5–5.5)
PROT SERPL-MCNC: 6 G/DL (ref 6.4–8.2)
RBC # BLD AUTO: 3.24 M/UL (ref 4.2–5.3)
RBC MORPH BLD: ABNORMAL
SODIUM SERPL-SCNC: 139 MMOL/L (ref 136–145)
WBC # BLD AUTO: 11.3 K/UL (ref 4.6–13.2)

## 2017-05-22 PROCEDURE — 74011250637 HC RX REV CODE- 250/637: Performed by: INTERNAL MEDICINE

## 2017-05-22 PROCEDURE — 80076 HEPATIC FUNCTION PANEL: CPT | Performed by: INTERNAL MEDICINE

## 2017-05-22 PROCEDURE — 74011250637 HC RX REV CODE- 250/637: Performed by: HOSPITALIST

## 2017-05-22 PROCEDURE — 85025 COMPLETE CBC W/AUTO DIFF WBC: CPT | Performed by: INTERNAL MEDICINE

## 2017-05-22 PROCEDURE — 94640 AIRWAY INHALATION TREATMENT: CPT

## 2017-05-22 PROCEDURE — 65270000032 HC RM SEMIPRIVATE

## 2017-05-22 PROCEDURE — 80048 BASIC METABOLIC PNL TOTAL CA: CPT | Performed by: INTERNAL MEDICINE

## 2017-05-22 PROCEDURE — 82962 GLUCOSE BLOOD TEST: CPT

## 2017-05-22 PROCEDURE — 77010033678 HC OXYGEN DAILY

## 2017-05-22 PROCEDURE — 65270000029 HC RM PRIVATE

## 2017-05-22 PROCEDURE — 74011000258 HC RX REV CODE- 258: Performed by: INTERNAL MEDICINE

## 2017-05-22 PROCEDURE — 36415 COLL VENOUS BLD VENIPUNCTURE: CPT | Performed by: INTERNAL MEDICINE

## 2017-05-22 PROCEDURE — 74011636637 HC RX REV CODE- 636/637: Performed by: FAMILY MEDICINE

## 2017-05-22 PROCEDURE — 74011250636 HC RX REV CODE- 250/636: Performed by: INTERNAL MEDICINE

## 2017-05-22 PROCEDURE — 74011250636 HC RX REV CODE- 250/636: Performed by: HOSPITALIST

## 2017-05-22 PROCEDURE — 74011000250 HC RX REV CODE- 250: Performed by: UROLOGY

## 2017-05-22 RX ORDER — IPRATROPIUM BROMIDE AND ALBUTEROL SULFATE 2.5; .5 MG/3ML; MG/3ML
3 SOLUTION RESPIRATORY (INHALATION)
Status: DISCONTINUED | OUTPATIENT
Start: 2017-05-22 | End: 2017-05-25 | Stop reason: HOSPADM

## 2017-05-22 RX ORDER — FLUCONAZOLE 200 MG/1
200 TABLET ORAL DAILY
Status: DISCONTINUED | OUTPATIENT
Start: 2017-05-23 | End: 2017-05-25 | Stop reason: HOSPADM

## 2017-05-22 RX ADMIN — FAMOTIDINE 20 MG: 20 TABLET ORAL at 17:06

## 2017-05-22 RX ADMIN — LACTOBACILLUS TAB 1 TABLET: TAB at 17:06

## 2017-05-22 RX ADMIN — ONDANSETRON 4 MG: 2 INJECTION INTRAMUSCULAR; INTRAVENOUS at 20:59

## 2017-05-22 RX ADMIN — PRAVASTATIN SODIUM 40 MG: 20 TABLET ORAL at 23:04

## 2017-05-22 RX ADMIN — FLUCONAZOLE 200 MG: 2 INJECTION INTRAVENOUS at 09:16

## 2017-05-22 RX ADMIN — MEROPENEM 1 G: 1 INJECTION, POWDER, FOR SOLUTION INTRAVENOUS at 18:00

## 2017-05-22 RX ADMIN — INSULIN GLARGINE 7 UNITS: 100 INJECTION, SOLUTION SUBCUTANEOUS at 09:14

## 2017-05-22 RX ADMIN — IPRATROPIUM BROMIDE AND ALBUTEROL SULFATE 3 ML: 2.5; .5 SOLUTION RESPIRATORY (INHALATION) at 11:23

## 2017-05-22 RX ADMIN — LACTOBACILLUS TAB 1 TABLET: TAB at 09:13

## 2017-05-22 RX ADMIN — Medication 2 TABLET: at 17:06

## 2017-05-22 RX ADMIN — MEROPENEM 1 G: 1 INJECTION, POWDER, FOR SOLUTION INTRAVENOUS at 10:25

## 2017-05-22 RX ADMIN — CHOLECALCIFEROL TAB 25 MCG (1000 UNIT) 2000 UNITS: 25 TAB at 09:12

## 2017-05-22 RX ADMIN — Medication 10 ML: at 23:05

## 2017-05-22 RX ADMIN — FAMOTIDINE 20 MG: 20 TABLET ORAL at 09:14

## 2017-05-22 RX ADMIN — MIDODRINE HYDROCHLORIDE 10 MG: 2.5 TABLET ORAL at 03:29

## 2017-05-22 RX ADMIN — Medication 10 ML: at 06:00

## 2017-05-22 RX ADMIN — MIDODRINE HYDROCHLORIDE 10 MG: 2.5 TABLET ORAL at 17:06

## 2017-05-22 RX ADMIN — MEROPENEM 1 G: 1 INJECTION, POWDER, FOR SOLUTION INTRAVENOUS at 03:28

## 2017-05-22 RX ADMIN — MIDODRINE HYDROCHLORIDE 10 MG: 2.5 TABLET ORAL at 09:13

## 2017-05-22 RX ADMIN — Medication 10 ML: at 13:06

## 2017-05-22 RX ADMIN — FOLIC ACID 1 MG: 1 TABLET ORAL at 09:13

## 2017-05-22 RX ADMIN — DOCUSATE SODIUM 100 MG: 100 CAPSULE, LIQUID FILLED ORAL at 09:13

## 2017-05-22 RX ADMIN — CEFTRIAXONE 2 G: 2 INJECTION, POWDER, FOR SOLUTION INTRAMUSCULAR; INTRAVENOUS at 13:06

## 2017-05-22 RX ADMIN — ALLOPURINOL 50 MG: 100 TABLET ORAL at 09:13

## 2017-05-22 NOTE — PROGRESS NOTES
Infectious Disease Progress Note    Requested by: Dr. Brooks Hall, dr. Domonique Snow    Reason: sepsis, acute paraplegia    Current abx Prior abx   Meropenem since 5/15  Fluconazole since 5/16/17   Cefepime 4/20-4/21  vancomycin  4/20-4/24  Gentamicin 4/21-4/24  Metronidazole  4/20-4/24  Ceftriaxone  4/24- 5/15  linezolid 5/14-5/17     Lines:   PICC RUE 4/26    Assessment :    77 y.o., right handed female, with an established history of hypertension, complete heart block with permanent pacemaker placed, diabetes mellitus, diabetic peripheral neuropathy, obesity, admitted to [facility] on 4/20/17. Clinical presentation consistent with  Sepsis (POA)  secondary to group B streptococcus bloodstream infection (positive blood cultures 4/20, negative blood cultures 4/21). Most likely source of bloodstream infection is infected right psoas hematoma/abscess. 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 No signs of neurological recovery on today's exam     2 week h/o pain at the site of abdominal pacemaker, tenderness at the site - however, patient doesn't have erythema at the site of pacemaker. No fluid collection noted at pacemaker pocket site per usg 4/24. Quick clearance of bacteremia would argue against endocarditis/endovascular infection. At this time risk of removal of pacemaker/general pocket change exceed the benefit. Discussed with cardiology. Would recommend close monitoring. Abdominal usg 4/24 doesn't reveal evidence of abscess/fluid collection at the site of pacemaker pocket. Enterococcus in urine cultures 4/20 likely colonizer    Acute renal failure: nephrology consult appreciated. Difficult to determine exact etiology of ARF at this time. Slight improvement in creatinine today.      Low grade fever overnight - Ct scan 5/4 reveals increasing focal area within the right psoas muscle inferior to the previously drained area. could represent either spread of infection inferiorly or intramuscular hematoma. S/p ct guided IR drain check - old drain removed. New drain placed inferiorly on 5/10    No evidence of pneumonia    High grade fevers with tm:102 on 5/10, tm:103 on 5/13 - ?due to infected picc line. two of two sets of blood cultures 5/14 positive for coagulase negative staphylococcus. ?right sided aspiration pneumonia - unable to perform thoracentesis since inadequate fluid. One of two sets of blood cultures could be contaminant. Will need to follow repeat blood cultures to determine this. Acute renal failure: multifactorial - nephrology consult appreciated     bilateral LE dvt - s/p IVC filter placement 5/11    No leukocytosis     Resolved fevers. No evidence of abdominal wall cellulitis noted on ct scan. Clinically better    Recommendations:    1. D/c meropenem in am, cont fluconazole (d6/14) to cover for possible candida cystitis. Will switch back to ceftriaxone till 6/14/17 in am to complete treatment for infected psoas abscess once done with meropenem  2. IR consult for psoas drain removal   3. Ok to leave picc line for now  4. D/c planning per primary team      Above plan was discussed in details with patient, . Please call me if any further questions or concerns. Will continue to participate in the care of this patient. subjective:    Feels better. Denies cp, sob. Unable to move legs. No nausea, vomiting. No new rash/itching/joint pain/back pain. Home Medication List    Details   gabapentin (NEURONTIN) 400 mg capsule Take 1 Cap by mouth two (2) times a day. Indications: NEUROPATHIC PAIN  Qty: 30 Cap, Refills: 0    Associated Diagnoses: Iliopsoas muscle hematoma, right, subsequent encounter      cholecalciferol (VITAMIN D3) 1,000 unit tablet Take 2 Tabs by mouth daily.  Indications: PREVENTION OF VITAMIN D DEFICIENCY  Qty: 30 Tab, Refills: 0 SITagliptin (JANUVIA) 50 mg tablet Take 50 mg by mouth daily. Indications: type 2 diabetes mellitus      potassium chloride (KLOR-CON M20) 20 mEq tablet Take 20 mEq by mouth two (2) times a day. Indications: HYPOKALEMIA PREVENTION      ondansetron (ZOFRAN ODT) 4 mg disintegrating tablet Take 4 mg by mouth every eight (8) hours as needed for Nausea. cyclobenzaprine (FLEXERIL) 10 mg tablet Take 10 mg by mouth as needed for Muscle Spasm(s). Indications: MUSCLE SPASM      carvedilol (COREG) 6.25 mg tablet Take 1 Tab by mouth two (2) times daily (with meals). Indications: hypertension  Qty: 30 Tab, Refills: 0    Associated Diagnoses: Benign hypertensive heart disease with systolic CHF, NYHA class 2 (HCC)      acetaminophen (TYLENOL) 325 mg tablet Take 2 Tabs by mouth every four (4) hours as needed (for fever or pain level less than 5/10). Indications: Fever, Pain  Qty: 30 Tab, Refills: 0    Associated Diagnoses: Iliopsoas muscle hematoma, right, subsequent encounter      allopurinol (ZYLOPRIM) 100 mg tablet Take 1 Tab by mouth daily. Indications: GOUT  Qty: 15 Tab, Refills: 0    Associated Diagnoses: Chronic gout, unspecified cause, unspecified site      insulin glargine (LANTUS) 100 unit/mL injection 15 Units by SubCUTAneous route nightly. Indications: type 2 diabetes mellitus  Qty: 1 Vial, Refills: 0    Associated Diagnoses: Type 2 diabetes mellitus with diabetic neuropathy, with long-term current use of insulin ([facility])      docusate sodium (COLACE) 100 mg capsule Take 1 Cap by mouth daily (after breakfast). Indications: Constipation  Qty: 15 Cap, Refills: 0      senna-docusate (PERICOLACE) 8.6-50 mg per tablet Take 2 Tabs by mouth daily (after dinner). Indications: Constipation  Qty: 30 Tab, Refills: 0      oxyCODONE-acetaminophen (PERCOCET 7.5) 7.5-325 mg per tablet Take 1 Tab by mouth every four (4) hours as needed (for pain level greater than 4/10). Max Daily Amount: 6 Tabs.  Indications: Pain  Qty: 50 Tab, Refills: 0    Associated Diagnoses: Iliopsoas muscle hematoma, right, subsequent encounter      bumetanide (BUMEX) 1 mg tablet TAKE 1 TABLET TWICE A DAY  Qty: 180 Tab, Refills: 1      pravastatin (PRAVACHOL) 40 mg tablet Take 40 mg by mouth nightly. Indications: DYSLIPIDEMIA      ferrous sulfate 325 mg (65 mg iron) tablet Take 1 Tab by mouth two (2) times daily (with meals).   Qty: 30 Tab, Refills: 0      insulin aspart (NOVOLOG) 100 unit/mL injection INITIATE INSULIN CORRECTIVE PROTOCOL (HR): Normal Insulin Sensitivity  For Blood Sugar (mg/dL) of:    Less than 150 =   0 units          150 -199 =   2 units 200 -249 =   4 units 250 -299 =   6 units 300 -349 =   8 units 350 and above =   10 units If 2 glucose readings are above 200 mg/dL  Qty: 10 mL, Refills: 6             Current Facility-Administered Medications   Medication Dose Route Frequency    famotidine (PEPCID) tablet 20 mg  20 mg Oral BID    meropenem (MERREM) 1 g in 0.9% sodium chloride (MBP/ADV) 50 mL MBP  1 g IntraVENous Q8H    insulin lispro (HUMALOG) injection   SubCUTAneous AC&HS    insulin glargine (LANTUS) injection 7 Units  7 Units SubCUTAneous DAILY    0.9% sodium chloride infusion 250 mL  250 mL IntraVENous PRN    midodrine (PROAMITINE) tablet 10 mg  10 mg Oral Q8H    fluconazole (DIFLUCAN) 200mg/100 mL IVPB (premix)  200 mg IntraVENous Q24H    acetaminophen (TYLENOL) suppository 650 mg  650 mg Rectal Q6H PRN    acetaminophen (TYLENOL) tablet 500 mg  500 mg Oral Q6H PRN    diclofenac (VOLTAREN) 1 % topical gel 2 g  2 g Topical Q8H PRN    allopurinol (ZYLOPRIM) tablet 50 mg  50 mg Oral DAILY    aluminum-magnesium hydroxide (MAALOX) oral suspension 15 mL  15 mL Oral QID PRN    albuterol-ipratropium (DUO-NEB) 2.5 MG-0.5 MG/3 ML  3 mL Nebulization Q4HWA RT    Lactobacillus Acidoph & Bulgar (FLORANEX) tablet 1 Tab  1 Tab Oral BID    sodium chloride (NS) flush 5-10 mL  5-10 mL IntraVENous Q8H    naloxone (NARCAN) injection 0.1 mg  0.1 mg IntraVENous Multiple    acetaminophen (TYLENOL) tablet 650 mg  650 mg Oral Q4H PRN    0.9% sodium chloride infusion 250 mL  250 mL IntraVENous PRN    diphenhydrAMINE (BENADRYL) capsule 50 mg  50 mg Oral Q4H PRN    polyethylene glycol (MIRALAX) packet 17 g  17 g Oral TID    cholecalciferol (VITAMIN D3) tablet 2,000 Units  2,000 Units Oral DAILY    docusate sodium (COLACE) capsule 100 mg  100 mg Oral DAILY AFTER BREAKFAST    oxyCODONE-acetaminophen (PERCOCET 7.5) 7.5-325 mg per tablet 1 Tab  1 Tab Oral Q4H PRN    pravastatin (PRAVACHOL) tablet 40 mg  40 mg Oral QHS    senna-docusate (PERICOLACE) 8.6-50 mg per tablet 2 Tab  2 Tab Oral PCD    folic acid (FOLVITE) tablet 1 mg  1 mg Oral DAILY    ondansetron (ZOFRAN) injection 4 mg  4 mg IntraVENous Q8H PRN    glucose chewable tablet 16 g  16 g Oral PRN    glucagon (GLUCAGEN) injection 1 mg  1 mg IntraMUSCular PRN    dextrose (D50W) injection syrg 12.5-25 g  25-50 mL IntraVENous PRN       Allergies: Vancomycin; Ampicillin; Bactrim [sulfamethoxazole-trimethoprim]; Blueberry; Ciprofloxacin; Codeine; Crestor [rosuvastatin]; Darvocet a500 [propoxyphene n-acetaminophen]; Demerol [meperidine]; Levaquin [levofloxacin]; Lipitor [atorvastatin]; Magnesium oxide; Minocin [minocycline]; Pcn [penicillins]; Pravachol [pravastatin]; Sulfa (sulfonamide antibiotics); Ultracet [tramadol-acetaminophen]; Vicodin [hydrocodone-acetaminophen]; Vytorin 10-10 [ezetimibe-simvastatin]; and Percodan [oxycodone hcl-oxycodone-asa]    Temp (24hrs), Av.5 °F (36.9 °C), Min:98.1 °F (36.7 °C), Max:98.7 °F (37.1 °C)    Visit Vitals    /67 (BP 1 Location: Right arm, BP Patient Position: At rest)    Pulse 79    Temp 98.7 °F (37.1 °C)    Resp 18    Ht 5' 7\" (1.702 m)    Wt 114.2 kg (251 lb 12.3 oz)    SpO2 97%    Breastfeeding No    BMI 39.43 kg/m2       ROS: patient unable to communicate fluently. Detailed ros not feasible.     Physical Exam:    General: Well developed, well nourished female laying on the bed, AAOx3 NAD    General:   awake alert and oriented   HEENT:  Normocephalic, atraumatic, PERRL, EOMI, no scleral icterus or pallor; no conjunctival hemmohage;  nasal and oral mucous are moist and without evidence of lesions. Neck supple, no bruits. Lymph Nodes:   no cervical, axillary or inguinal adenopathy   Lungs:   non-labored, bilaterally clear to auscultation- no crackles wheezes rales or rhonchi   Heart:   s1 and s2 irregular; no rubs or gallops, no edema, + pedal pulses   Abdomen:  soft, non-distended, active bowel sounds, no hepatomegaly, no splenomegaly. no tenderness over the left abdominal pacemaker, no overlying erythema or fluctuance, drain right lower abdomen with serous drainage, mild linnette umbilical tenderness   Genitourinary:  deferred   Extremities:   no clubbing, cyanosis; no joint effusions or swelling; muscle mass appropriate for age   Neurologic:  No gross focal sensory abnormalities; 5/5 muscle strength to upper extremities. 0/5 strength in lower extremities.   Cranial nerves intact                        Skin:  Surgical scars abdomen, left neck well healed   Back:   no paraspinal muscle guarding or rigidity, no right CVA tenderness     Psychiatric:  No suicidal or homicidal ideations, appropriate mood and affect         Labs: Results:   Chemistry Recent Labs      05/22/17 0449 05/21/17 0511 05/20/17 0323   GLU  79  111*  126*   NA  139  137  138   K  3.6  3.8  3.4*   CL  102  103  104   CO2  29  29  29   BUN  14  17  21*   CREA  1.08  1.15  1.45*   CA  8.5  8.6  8.7   AGAP  8  5  5   BUCR  13  15  14   AP  168*  178*  191*   TP  6.0*  6.0*  5.9*   ALB  2.1*  2.1*  2.2*   GLOB  3.9  3.9  3.7   AGRAT  0.5*  0.5*  0.6*      CBC w/Diff Recent Labs      05/22/17 0449 05/21/17 0511 05/20/17   0323   WBC  11.3  13.7*  13.0   RBC  3.24*  3.24*  3.21*   HGB  8.4*  8.5*  8.4*   HCT  26.0*  26.1*  25.9*   PLT  188  207  226   GRANS  80*  79*  73   LYMPH 13*  13*  12*   EOS  4  2  2      Microbiology No results for input(s): CULT in the last 72 hours.        RADIOLOGY:    All available imaging studies/reports in [facility] for this admission were reviewed    Dr. Jah Parisi, Infectious Disease Specialist  [phone]  May 22, 2017  3:49 PM

## 2017-05-22 NOTE — PROGRESS NOTES
[facility]  Pulmonary, Critical Care, and Sleep Medicine    Name: Anitha Garay MRN: [id number]   : 1950 Hospital: [facility]   Date: 2017        Subjective/Interval History:     Patient is a 77 y.o. female w/hx of HTN, CHF, heart block s/p pacer, multiple vascular procedures in neck veins with residual stenoses, prior Hx of DVT, DM, Afib, recent admission for Rt psoas hematoma. Was readmitted on 17 for acute paraplegia, fevers, sweats. Repeat CT demonstrated increased size of psoas fluid collection. Labs notable for leukocytosis and LISBETH. Pt brought to IR and 300ml of pus aspirated. Hypotensive upon return to floor, and still so after ~1500 ml IVF. Pt was transferred to ICU on 17 for hypotension s/p procedure; recovered with pressor support and fluids. Pt was then transferred out of ICU floor. Last seen by Pulmonary /ICU - 17; reconsulted on 17 2/2 SOB x2-3 days without clear cause. VQ scan - low probability for PE; PVL's (+) DVT in R Common Femoral Vein & L popliteal vein. Pt then underwent emergent IVC placement d/t inability to anticoagulate using heparin 2/2 hx Psoas muscle hematoma. 05/15/17: Transferred to ICU for altered mental status & Hypotension  17: Transferred back to Floor - 4N    17:   - Alert, awake, resting comfortably on 2LPM NC with  at bedside; pt eating lunch. - Slight SOB still present but states but continues to improve; still c/o some chest tightness with her breathing treatments and when moving her upper extremities- states she just feels weak - improving  - Overall, appears improved  - Improving PO intake  - Denies any SOB, substernal CP, palpitations, abd pain, N/V/D, F/C, H/A    ROS:Pertinent items are noted in HPI.     Objective:   Vital Signs:    Visit Vitals    /76 (BP 1 Location: Right arm, BP Patient Position: At rest)    Pulse 75    Temp 99 °F (37.2 °C)    Resp 18    Ht 5' 7\" (1.702 m)    Wt 114.2 kg (251 lb 12.3 oz)    SpO2 96%    Breastfeeding No    BMI 39.43 kg/m2       O2 Device: Nasal cannula   O2 Flow Rate (L/min): 2 l/min   Temp (24hrs), Av.6 °F (37 °C), Min:98.1 °F (36.7 °C), Max:99 °F (37.2 °C)       Intake/Output:   Last shift:      701 - 1900  In: 460 [P.O.:460]  Out: 875 [Urine:875]  Last 3 shifts: 1901 -  07  In: 200 [I.V.:200]  Out: 5174 [Urine:1850]    Intake/Output Summary (Last 24 hours) at 17 1506  Last data filed at 17 1416   Gross per 24 hour   Intake              510 ml   Output              875 ml   Net             -365 ml        Physical Exam:    General: Alert, awake, resting comfortably on 2LPM NC   HEENT: Anicteric sclerae; pink palpebral conjunctivae; mucosa moist   Resp: Symmetrical chest rise; mod AE bilat; slightly diminished bibasilar - no change; CTAB; no wheezes/rhonchi/rales noted. CV: RRR, S1S2 normal; No m/r/g   Abdomen: Soft, non-tender; (+) B. S x4; No organomegaly   Extremity: +1 BLE edema; No cyanosis/clubbing noted; peripheral pulses 2+ and symmetrical   Neuro: Alert, non-focal   Skin: Warm, dry; No rashes/lesions noted. PICC L upper arm. DATA:  Labs:  Recent Labs      17   04417   0511  17   0323   WBC  11.3  13.7*  13.0   HGB  8.4*  8.5*  8.4*   HCT  26.0*  26.1*  25.9*   PLT  188  207  226     Recent Labs      17   0449  17   0511  17   0323   NA  139  137  138   K  3.6  3.8  3.4*   CL  102  103  104   CO2  29  29  29   GLU  79  111*  126*   BUN  14  17  21*   CREA  1.08  1.15  1.45*   CA  8.5  8.6  8.7   MG   --    --   1.8   ALB  2.1*  2.1*  2.2*   SGOT  39*  42*  46*   ALT  22  26  27       PFT:   N/A                                                   Echo [17]:  SUMMARY:  Left ventricle: Size was at the upper limits of normal. Systolic function  was mildly reduced by visual assessment. Ejection fraction was estimated  to be 45 %.  There was severe hypokinesis of the basal-mid inferoseptal,  apical septal, and apical wall(s). Structure noted in the apex as  mentioned on previous studies. Possible muscle band vs fibrinous mass. Imaging:  No new imaging    CT ABD PELV WO CONT [05/16/17]: IMPRESSION:  1. Smaller right psoas muscle implying smaller intramuscular fluid collection and less edema/inflammation. Draining catheter is still in place. Limited evaluation without IV contrast. Slightly smaller presacral fluid with no discrete collection. 2. Less severe left lower lobe pulmonary consolidation but more severe right lower lobe consolidation. No pleural effusion. 3. Worsening anasarca/subcutaneous edema on both sides. 4. More fluid in the ascending and transverse colon, colitis or diarrheal disease? [x]I have personally reviewed the patients radiographs  [x]Radiographs reviewed with radiologist   [x]No change from prior, tubes and lines in adequate position  []Improved   [x]Worsening    IMPRESSION:   · R sided Pleural effusion - Slightly worse on CT (05/16/17) - will con't to monitor  · (+) DVT -  R Common Femoral Vein and L popliteal vein; s/p IVC filter. V/Q scan on 5/11/17: low probability. Small PE is possible but doubtful currently and given current clinical status not a candidate for systemic anticoagulation. · Hypoxia d/t atelectasis - Essentially resolved - RA   · Atypical Chest pain- doubt cardiac or pulmonary etiology. ?? Diaphragmatic irritation from abdominally placed pacemaker? ? Vs GI/ esophageal etiology. - Improving   · Sepsis - Initially 2/2 infected psoas hematoma/epidural abscess (GAS) with neurological compromise- now with GPC in blood and right-sided infiltrate. ? PICC infection, ? UTI, ? HCAP, pacer infection, fungemia. Also has new LUQ pain (?splenic abscess).    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, resulting in spinal cord infarct- stable  · Enterococcus faecalis in urine (gent susceptible)  · Bacteremia   · Infected pacer pocket  · Psoas hematoma  · LISBETH - D/t neurogenic bladder and component of ATN  · NSTEMI   · Hx of CHF and Afib  · Prior complicated pacer placement (apparently upper extremity and central vein stenoses)  · Inability to obtain MRI (pacer)     Patient Active Problem List   Diagnosis Code    Nonischemic cardiomyopathy ([facility] I42.8    History of complete heart block Z86.79    Biventricular implantable cardioverter-defibrillator in situ Z95.810    Left bundle branch block (LBBB) on electrocardiogram I44.7    Type 2 diabetes mellitus with diabetic neuropathy ([facility]) E11.40    Dyslipidemia E78.5    Diabetic neuropathy associated with type 2 diabetes mellitus ([facility] [facility] E11.40    Obstructive sleep apnea on CPAP G47.33, Z99.89    AICD generator infection ([facility] [facility] T82. 7XXA    Difficult airway for intubation T88. 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 1XXA    Impaired mobility and ADLs Z74.09    History of Coumadin therapy Z79.01    Gout M10.9    Acute paraplegia (HCC) G82.20    Sepsis ([facility] [address] A41.9    Psoas abscess, right ([facility] [address] K68.12    Krueger catheter in place on admission Z96.0    Urinary tract infection due to Enterococcus N39.0, B95.2    Group B streptococcal infection A49.1      PLAN:   · SpO2 goal >92%; titrate supp O2 PRN; currently resting comfortably on 2LPM NC  · Aspiration precautions, HOB >30'  · Will repeat CXR / ABG if respiratory status worsens or mental status changes dramatically  · Pulmonary toileting; encourage ICS; pt still not taking very deep breaths - con't to encourage pt on deep breathing to help expand lungs. · Desculate duo-nebs to q6 while awake with EZ Pap; No indication for steroids at this time  · Afebrile overnight; aleukocytosis; normotensive; will monitor s/s for infection; ID Following; current ABX: Rocephin 2g q24 hrs (started on 05/22 to complete Tx for infected psoas muscle, per ID); Merrem 1g,q8; Diflucan 200mg q24 hrs.    · Encourage pt to eat; PT/OT  · Will con't to monitor closely - Low threshold for decompensation   · DVT/PUD Prophylaxis per primary        Cecilia Reis PA-C

## 2017-05-22 NOTE — PROGRESS NOTES
Progress Note    Patient: Sandra Michael MRN: [id number]  SSN: [id number]    YOB: 1950  Age: 77 y.o.   Sex: female      Admit Date: 4/20/2017    LOS: 32 days     Subjective:     DENIES ANY NEW SX OF N/V  TEMP DOWN  NO  FLANK OR ABD PAIN  STILL HAS DRAIN AND BARNETT    Objective:     Vitals:    05/21/17 1656 05/21/17 2050 05/22/17 0030 05/22/17 0737   BP:  149/74 128/70 138/67   Pulse:  73  79   Resp:  17 18 18   Temp:  98.4 °F (36.9 °C) 98.7 °F (37.1 °C) 98.7 °F (37.1 °C)   TempSrc:       SpO2: 100% 97% 95% 97%   Weight:       Height:            Intake and Output:  Current Shift: 05/22 0701 - 05/22 1900  In: 240 [P.O.:240]  Out: 550 [Urine:550]  Last three shifts: 05/20 1901 - 05/22 0700  In: 200 [I.V.:200]  Out: 1851 [Urine:1850]    Current Facility-Administered Medications   Medication Dose Route Frequency    famotidine (PEPCID) tablet 20 mg  20 mg Oral BID    meropenem (MERREM) 1 g in 0.9% sodium chloride (MBP/ADV) 50 mL MBP  1 g IntraVENous Q8H    insulin lispro (HUMALOG) injection   SubCUTAneous AC&HS    insulin glargine (LANTUS) injection 7 Units  7 Units SubCUTAneous DAILY    0.9% sodium chloride infusion 250 mL  250 mL IntraVENous PRN    midodrine (PROAMITINE) tablet 10 mg  10 mg Oral Q8H    fluconazole (DIFLUCAN) 200mg/100 mL IVPB (premix)  200 mg IntraVENous Q24H    acetaminophen (TYLENOL) suppository 650 mg  650 mg Rectal Q6H PRN    acetaminophen (TYLENOL) tablet 500 mg  500 mg Oral Q6H PRN    diclofenac (VOLTAREN) 1 % topical gel 2 g  2 g Topical Q8H PRN    allopurinol (ZYLOPRIM) tablet 50 mg  50 mg Oral DAILY    aluminum-magnesium hydroxide (MAALOX) oral suspension 15 mL  15 mL Oral QID PRN    albuterol-ipratropium (DUO-NEB) 2.5 MG-0.5 MG/3 ML  3 mL Nebulization Q4HWA RT    Lactobacillus Acidoph & Bulgar (FLORANEX) tablet 1 Tab  1 Tab Oral BID    sodium chloride (NS) flush 5-10 mL  5-10 mL IntraVENous Q8H    naloxone (NARCAN) injection 0.1 mg  0.1 mg IntraVENous Multiple  acetaminophen (TYLENOL) tablet 650 mg  650 mg Oral Q4H PRN    0.9% sodium chloride infusion 250 mL  250 mL IntraVENous PRN    diphenhydrAMINE (BENADRYL) capsule 50 mg  50 mg Oral Q4H PRN    polyethylene glycol (MIRALAX) packet 17 g  17 g Oral TID    cholecalciferol (VITAMIN D3) tablet 2,000 Units  2,000 Units Oral DAILY    docusate sodium (COLACE) capsule 100 mg  100 mg Oral DAILY AFTER BREAKFAST    oxyCODONE-acetaminophen (PERCOCET 7.5) 7.5-325 mg per tablet 1 Tab  1 Tab Oral Q4H PRN    pravastatin (PRAVACHOL) tablet 40 mg  40 mg Oral QHS    senna-docusate (PERICOLACE) 8.6-50 mg per tablet 2 Tab  2 Tab Oral PCD    folic acid (FOLVITE) tablet 1 mg  1 mg Oral DAILY    ondansetron (ZOFRAN) injection 4 mg  4 mg IntraVENous Q8H PRN    glucose chewable tablet 16 g  16 g Oral PRN    glucagon (GLUCAGEN) injection 1 mg  1 mg IntraMUSCular PRN    dextrose (D50W) injection syrg 12.5-25 g  25-50 mL IntraVENous PRN         Physical Exam:   GENERAL: alert, cooperative, no distress, appears stated age  ABDOMEN: soft, non-tender.  Bowel sounds normal. No masses,  no organomegaly  FLANK: NON  TENDER        Lab/Data Review:  BMP:   Lab Results   Component Value Date/Time     05/22/2017 04:49 AM    K 3.6 05/22/2017 04:49 AM     05/22/2017 04:49 AM    CO2 29 05/22/2017 04:49 AM    AGAP 8 05/22/2017 04:49 AM    GLU 79 05/22/2017 04:49 AM    BUN 14 05/22/2017 04:49 AM    CREA 1.08 05/22/2017 04:49 AM    GFRAA >60 05/22/2017 04:49 AM    GFRNA 51 (L) 05/22/2017 04:49 AM     CMP:   Lab Results   Component Value Date/Time     05/22/2017 04:49 AM    K 3.6 05/22/2017 04:49 AM     05/22/2017 04:49 AM    CO2 29 05/22/2017 04:49 AM    AGAP 8 05/22/2017 04:49 AM    GLU 79 05/22/2017 04:49 AM    BUN 14 05/22/2017 04:49 AM    CREA 1.08 05/22/2017 04:49 AM    GFRAA >60 05/22/2017 04:49 AM    GFRNA 51 (L) 05/22/2017 04:49 AM    CA 8.5 05/22/2017 04:49 AM    ALB 2.1 (L) 05/22/2017 04:49 AM    TP 6.0 (L) 05/22/2017 04:49 AM    GLOB 3.9 05/22/2017 04:49 AM    AGRAT 0.5 (L) 05/22/2017 04:49 AM    SGOT 39 (H) 05/22/2017 04:49 AM    ALT 22 05/22/2017 04:49 AM     CBC:   Lab Results   Component Value Date/Time    WBC 11.3 05/22/2017 04:49 AM    HGB 8.4 (L) 05/22/2017 04:49 AM    HCT 26.0 (L) 05/22/2017 04:49 AM     05/22/2017 04:49 AM          CT Results:    Results from Hospital Encounter encounter on 04/20/17   CT ABD PELV WO CONT   Narrative CT abdomen and pelvis without IV or oral contrast    INDICATION: Evaluate fluid collection and new left upper quadrant pain. Comparison May 4, 2017    All CT scans at this facility are performed using dose optimization technique as  appropriate to a performed exam, to include automated exposure control,  adjustment of the mA and/or kV according to patient size (including appropriate  matching for site specific examination) or use of iterative reconstruction  technique. More consolidation/pneumonia in the right lower lobe. Less severe left lower  lobe pneumonia when compared to prior study. No significant effusion. Cardiomegaly with no pericardial effusion. The right psoas muscle is slightly smaller. Draining catheter is still in  place. No large fluid collection surrounding the catheter. Small residual  collection not completely excluded without IV contrast.    Also slightly smaller presacral fluid. Small abscess or loculation not  completely excluded but no focal collection identified. No new abnormalities in the left upper quadrant. No left upper quadrant  inflammation or free air. Splenomegaly is unchanged. Pancreas, liver, adrenal  glands and kidneys are stable with large left renal cyst. Aorta is normal in  caliber. IVC filter in place. More subcutaneous edema/anasarca along both sides. No discrete fluid collection. No bowel obstruction. Fluid in the colon, mild colitis, diarrheal disease  possible.  However, moderate fecal material retention in the rectosigmoid colon  and descending colon noted. Impression IMPRESSION:  1. Smaller right psoas muscle implying smaller intramuscular fluid collection  and less edema/inflammation. Draining catheter is still in place. Limited  evaluation without IV contrast. Slightly smaller presacral fluid with no  discrete collection. 2. Less severe left lower lobe pulmonary consolidation but more severe right  lower lobe consolidation. No pleural effusion. 3. Worsening anasarca/subcutaneous edema on both sides. 4. More fluid in the ascending and transverse colon, colitis or diarrheal  disease? US Results:    Results from [place] encounter on 04/20/17   US CHEST   Narrative Ultrasound of the chest    HISTORY: Pleural effusion. COMPARISON: Chest x-ray May 16, 2017. FINDINGS: Images of the right-sided chest was performed. There is no significant  pleural fluid present for safe thoracentesis. Thoracentesis was not performed. Impression IMPRESSION: Insufficient volume of right pleural effusion for thoracentesis.           Assessment:     Principal Problem:    Acute paraplegia ([address] (4/20/2017)    Active Problems:    Type 2 diabetes mellitus with diabetic neuropathy ([address] (6/28/2011)      AICD generator infection ([address] (8/20/2012)      Overview: S/p explant 4 leads 8/20/12      Benign hypertensive heart disease with systolic CHF, NYHA class 2 ([address] (9/5/2012)      Decreased calculated glomerular filtration rate (GFR) (3/30/2017)      Overview: Calculated GFR equivalent to that of CKD stage 3 = 30-59 ml/min      Iliopsoas muscle hematoma (3/30/2017)      Psoas hematoma, right, secondary to anticoagulant therapy (3/30/2017)      Impaired mobility and ADLs (3/30/2017)      History of Coumadin therapy ()      Overview: Anticoagulation for chronic atrial fibrillation; Discontinued on 3/30/2017      Sepsis ([address] (4/20/2017)      Psoas abscess, right ([address] (4/20/2017)      Krueger catheter in place on admission (4/20/2017)      Urinary tract infection due to Enterococcus (4/20/2017)      Group B streptococcal infection (4/20/2017)        Plan:     WOULD PLAN TO LEAVE  BARNETT IN UNTIL  SHE IS READY TO DO REHAB    DRAIN  COULD BE D/C'D    Signed By: Pramod Wolfe MD , FACS    May 22, 2017      PAGER:  [phone]  CELL:  [facility] [place] Flores:  [id number] [facility]

## 2017-05-22 NOTE — PROGRESS NOTES
[facility] Hospitalist Group  Progress Note    Patient: Abhay Garcia Age: 77 y.o. : 1950 MR#: [id number] SSN: [id number]  Date/Time: 2017     Subjective:     FELLS BETTER TO DAY   Still unable to move legs   Weakness present   No CP  No SOB  No NVD     Assessment/Plan:   Patient with infected PSOAS hematoma ,s/p drained by IR , bilateral LL weakness and unable to move legs . Long hospital stay . On Antibiotics . LISBETH  From possible neurogenic bladder ,perez cath in place . Renal following     1. Sepsis due to infected R psoas hematoma/epidural abscess - with neurologic compromise, paraplegia. Is s/p CT-guided drainage catheter, old drain removal on , growing grp B beta-hemo Strep. Antibiotics per ID  2. Acute paraplegia due to ?spinal cord infarct/?septic thrombophlebitis - PT, OT   3. LISBETH with ATN on CKDz stage 3 - due to neurogenic bladder. Intermittent cath. Monitor . 5. DM - SSI, monitor  6. HTN - monitor. 7. Dyslipidemia - statin. 8. Elevated trop I - due to demand ischemia, #1.  9. BLE DVT - s/p IVCFilter placement . No OAC due to #1/hematoma. 10. Severe prt-shelton malnutrition - supplements  11. Grp B beta-hemo Strep bacteremia - due to #1. Cultures reviewed. Most recent 5/15 blood cx NGTD,  with 3/4 bottles Staph epi. 12. Aspiration PNA vs HCAPNA -on merrem per ID  13. Possible candida cystitis - fluconazole.   14. Anemia- monitor    D/w renal   K+ po and follow electrolytes   Improving LISBETH         Case discussed with:  [x]Patient  []Family  [x]Nursing  []Case Management  DVT Prophylaxis:  []Lovenox  []Hep SQ  []SCDs  []Coumadin   []On Heparin gtt    Patient Active Problem List   Diagnosis Code    Nonischemic cardiomyopathy ([facility] I42.8    History of complete heart block Z86.79    Biventricular implantable cardioverter-defibrillator in situ Z95.810    Left bundle branch block (LBBB) on electrocardiogram I44.7    Type 2 diabetes mellitus with diabetic neuropathy ([facility] E11.40    Dyslipidemia E78.5    Diabetic neuropathy associated with type 2 diabetes mellitus ([facility] E11.40    Obstructive sleep apnea on CPAP G47.33, Z99.89    AICD generator infection ([facility] T82. 7XXA    Difficult airway for intubation T88. 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 1XXA    Impaired mobility and ADLs Z74.09    History of Coumadin therapy Z79.01    Gout M10.9    Acute paraplegia (HCC) G82.20    Sepsis ([facility] A41.9    Psoas abscess, right ([facility] K68.12    Krueger catheter in place on admission Z96.0    Urinary tract infection due to Enterococcus N39.0, B95.2    Group B streptococcal infection A49.1       Objective:   VS:   Visit Vitals    /67 (BP 1 Location: Right arm, BP Patient Position: At rest)    Pulse 79    Temp 98.7 °F (37.1 °C)    Resp 18    Ht 5' 7\" (1.702 m)    Wt 114.2 kg (251 lb 12.3 oz)    SpO2 97%    Breastfeeding No    BMI 39.43 kg/m2      Tmax/24hrs: Temp (24hrs), Av.5 °F (36.9 °C), Min:98.1 °F (36.7 °C), Max:98.7 °F (37.1 °C)  IOBRIEF  Intake/Output Summary (Last 24 hours) at 17 1007  Last data filed at 17 0935   Gross per 24 hour   Intake              390 ml   Output             2400 ml   Net            -2010 ml       General:  Alert, cooperative, no acute distress   HEENT:  NC, Atraumatic. PERRLA, anicteric sclerae. Pulmonary:  CTA Bilaterally. No Wheezing/Rhonchi/Rales.   Cardiovascular: Regular rate and Rhythm. GI:  Soft, Non distended, Non tender. + Bowel sounds. Extremities:  No edema, cyanosis, clubbing. No calf tenderness. Psych:  Not anxious or agitated.   Neurologic: paraplegia         Medications:   Current Facility-Administered Medications   Medication Dose Route Frequency    famotidine (PEPCID) tablet 20 mg  20 mg Oral BID    meropenem (MERREM) 1 g in 0.9% sodium chloride (MBP/ADV) 50 mL MBP  1 g IntraVENous Q8H    insulin lispro (HUMALOG) injection   SubCUTAneous AC&HS    insulin glargine (LANTUS) injection 7 Units  7 Units SubCUTAneous DAILY    0.9% sodium chloride infusion 250 mL  250 mL IntraVENous PRN    midodrine (PROAMITINE) tablet 10 mg  10 mg Oral Q8H    fluconazole (DIFLUCAN) 200mg/100 mL IVPB (premix)  200 mg IntraVENous Q24H    acetaminophen (TYLENOL) suppository 650 mg  650 mg Rectal Q6H PRN    acetaminophen (TYLENOL) tablet 500 mg  500 mg Oral Q6H PRN    diclofenac (VOLTAREN) 1 % topical gel 2 g  2 g Topical Q8H PRN    allopurinol (ZYLOPRIM) tablet 50 mg  50 mg Oral DAILY    aluminum-magnesium hydroxide (MAALOX) oral suspension 15 mL  15 mL Oral QID PRN    albuterol-ipratropium (DUO-NEB) 2.5 MG-0.5 MG/3 ML  3 mL Nebulization Q4HWA RT    Lactobacillus Acidoph & Bulgar (FLORANEX) tablet 1 Tab  1 Tab Oral BID    sodium chloride (NS) flush 5-10 mL  5-10 mL IntraVENous Q8H    naloxone (NARCAN) injection 0.1 mg  0.1 mg IntraVENous Multiple    acetaminophen (TYLENOL) tablet 650 mg  650 mg Oral Q4H PRN    0.9% sodium chloride infusion 250 mL  250 mL IntraVENous PRN    diphenhydrAMINE (BENADRYL) capsule 50 mg  50 mg Oral Q4H PRN    polyethylene glycol (MIRALAX) packet 17 g  17 g Oral TID    cholecalciferol (VITAMIN D3) tablet 2,000 Units  2,000 Units Oral DAILY    docusate sodium (COLACE) capsule 100 mg  100 mg Oral DAILY AFTER BREAKFAST    oxyCODONE-acetaminophen (PERCOCET 7.5) 7.5-325 mg per tablet 1 Tab  1 Tab Oral Q4H PRN    pravastatin (PRAVACHOL) tablet 40 mg  40 mg Oral QHS    senna-docusate (PERICOLACE) 8.6-50 mg per tablet 2 Tab  2 Tab Oral PCD    folic acid (FOLVITE) tablet 1 mg  1 mg Oral DAILY    ondansetron (ZOFRAN) injection 4 mg  4 mg IntraVENous Q8H PRN    glucose chewable tablet 16 g  16 g Oral PRN    glucagon (GLUCAGEN) injection 1 mg  1 mg IntraMUSCular PRN    dextrose (D50W) injection syrg 12.5-25 g  25-50 mL IntraVENous PRN       Labs:    Recent Results (from the past 24 hour(s))   GLUCOSE, POC    Collection Time: 05/21/17 11:02 AM   Result Value Ref Range    Glucose (POC) 114 (H) 70 - 110 mg/dL   GLUCOSE, POC    Collection Time: 05/21/17  3:40 PM   Result Value Ref Range    Glucose (POC) 107 70 - 110 mg/dL   GLUCOSE, POC    Collection Time: 05/21/17  9:40 PM   Result Value Ref Range    Glucose (POC) 93 70 - 110 mg/dL   HEPATIC FUNCTION PANEL    Collection Time: 05/22/17  4:49 AM   Result Value Ref Range    Protein, total 6.0 (L) 6.4 - 8.2 g/dL    Albumin 2.1 (L) 3.4 - 5.0 g/dL    Globulin 3.9 2.0 - 4.0 g/dL    A-G Ratio 0.5 (L) 0.8 - 1.7      Bilirubin, total 0.6 0.2 - 1.0 MG/DL    Bilirubin, direct 0.2 0.0 - 0.2 MG/DL    Alk. phosphatase 168 (H) 45 - 117 U/L    AST (SGOT) 39 (H) 15 - 37 U/L    ALT (SGPT) 22 13 - 56 U/L   CBC WITH AUTOMATED DIFF    Collection Time: 05/22/17  4:49 AM   Result Value Ref Range    WBC 11.3 4.6 - 13.2 K/uL    RBC 3.24 (L) 4.20 - 5.30 M/uL    HGB 8.4 (L) 12.0 - 16.0 g/dL    HCT 26.0 (L) 35.0 - 45.0 %    MCV 80.2 74.0 - 97.0 FL    MCH 25.9 24.0 - 34.0 PG    MCHC 32.3 31.0 - 37.0 g/dL    RDW 17.3 (H) 11.6 - 14.5 %    PLATELET 982 909 - 313 K/uL    MPV 8.2 (L) 9.2 - 11.8 FL    NEUTROPHILS 80 (H) 42 - 75 %    BAND NEUTROPHILS 1 0 - 5 %    LYMPHOCYTES 13 (L) 20 - 51 %    MONOCYTES 2 2 - 9 %    EOSINOPHILS 4 0 - 5 %    BASOPHILS 0 0 - 3 %    ABS. NEUTROPHILS 9.1 (H) 1.8 - 8.0 K/UL    ABS. LYMPHOCYTES 1.5 0.8 - 3.5 K/UL    ABS. MONOCYTES 0.2 0 - 1.0 K/UL    ABS. EOSINOPHILS 0.5 (H) 0.0 - 0.4 K/UL    ABS.  BASOPHILS 0.0 0.0 - 0.06 K/UL DF MANUAL      PLATELET COMMENTS ADEQUATE PLATELETS      RBC COMMENTS ANISOCYTOSIS  1+        RBC COMMENTS STOMATOCYTES  1+        RBC COMMENTS POLYCHROMASIA  1+       METABOLIC PANEL, BASIC    Collection Time: 05/22/17  4:49 AM   Result Value Ref Range    Sodium 139 136 - 145 mmol/L    Potassium 3.6 3.5 - 5.5 mmol/L    Chloride 102 100 - 108 mmol/L    CO2 29 21 - 32 mmol/L    Anion gap 8 3.0 - 18 mmol/L    Glucose 79 74 - 99 mg/dL    BUN 14 7.0 - 18 MG/DL    Creatinine 1.08 0.6 - 1.3 MG/DL    BUN/Creatinine ratio 13 12 - 20      GFR est AA >60 >60 ml/min/1.73m2    GFR est non-AA 51 (L) >60 ml/min/1.73m2    Calcium 8.5 8.5 - 10.1 MG/DL   GLUCOSE, POC    Collection Time: 05/22/17  8:31 AM   Result Value Ref Range    Glucose (POC) 89 70 - 110 mg/dL       Signed By: Analilia Guajardo MD     May 22, 2017

## 2017-05-22 NOTE — ROUTINE PROCESS
Bedside and Verbal shift change report given to Sona Barrientos LPN (oncoming nurse) by Kate Mojica RN (offgoing nurse). Report included the following information SBAR, Kardex, MAR and Recent Results. SITUATION:    Code Status: Full Code  Reason for Admission: Acute paraplegia   Acute paraplegia [facility] day: 28   Problem List:       Hospital Problems  Date Reviewed: 4/20/2017          Codes Class Noted POA    * (Principal)Acute paraplegia ([facility] [address] ICD-10-CM: G82.20  ICD-9-CM: 344.1  4/20/2017 Yes        Sepsis ([facility] [address] ICD-10-CM: A41.9  ICD-9-CM: 038.9, 995.91  4/20/2017 Yes        Psoas abscess, right ([facility] [address] ICD-10-CM: P61.64  ICD-9-CM: 567.31  4/20/2017 Yes        Krueger catheter in place on admission ICD-10-CM: Z96.0  ICD-9-CM: V45.89  4/20/2017 Yes        Urinary tract infection due to Enterococcus ICD-10-CM: N39.0, B95.2  ICD-9-CM: 599.0, 041.04  4/20/2017 Yes        Group B streptococcal infection ICD-10-CM: A49.1  ICD-9-CM: 041.02  4/20/2017 Yes        History of Coumadin therapy ICD-10-CM: Z79.01  ICD-9-CM: V58.61  Unknown Yes    Overview Signed 4/6/2017 10:35 PM by Louise Castorena MD     Anticoagulation for chronic atrial fibrillation; Discontinued on 3/30/2017             Decreased calculated glomerular filtration rate (GFR) ICD-10-CM: R94.4  ICD-9-CM: 794.4  3/30/2017 Yes    Overview Signed 4/6/2017  5:47 PM by Louise Castorena MD     Calculated GFR equivalent to that of CKD stage 3 = 30-59 ml/min             Iliopsoas muscle hematoma ICD-10-CM: S70.10XA  ICD-9-CM: 924.00  3/30/2017 Yes        Psoas hematoma, right, secondary to anticoagulant therapy ICD-10-CM: S30. 1XXA  ICD-9-CM: 924.9, E934.2  3/30/2017 Yes        Impaired mobility and ADLs ICD-10-CM: Z74.09  ICD-9-CM: 799.89  3/30/2017 Yes        Benign hypertensive heart disease with systolic CHF, NYHA class 2 (HCC) (Chronic) ICD-10-CM: I11.0, I50.20  ICD-9-CM: 402.11, 428.20, 428.0  9/5/2012 Yes        AICD generator infection ([facility] [address] ICD-10-CM: T82. 7XXA  ICD-9-CM: 996.61  8/20/2012 Yes    Overview Signed 8/20/2012  6:13 PM by Jc Miller MD     S/p explant 4 leads 8/20/12             Type 2 diabetes mellitus with diabetic neuropathy (HCC) (Chronic) ICD-10-CM: E11.40  ICD-9-CM: 250.60, 357.2  6/28/2011 Yes              BACKGROUND:    Past Medical History:   Past Medical History:   Diagnosis Date    Acute paraplegia ([facility] [address] 4/20/2017    Benign hypertensive heart disease with systolic CHF, NYHA class 2 ([address] 9/5/2012    Biventricular implantable cardioverter-defibrillator in situ 04/28/2005    Upgraded to BiV AICD; gen change 4/2008; pocket revision 10/2009; Abdominal - done on 8/22/2012 by Dr. Ruby Dutton Cardiac cath 08/15/1996    Patent coronaries. Elev LVEDP. EF 50-55%.  Cardiac echocardiogram 06/23/2015    Ltd study. EF 45-50%. Mild, diffuse hypk. Severe apical hypk. No mass or thrombus was clearly identified, although imaging was suboptimal.      Cardiac nuclear imaging test 06/19/2015    Fixed distal apical, distal septal defect more likely due to RV pacing than prior infarct. No ischemia. EF 46%. RWMA c/w RV pacing. Nondiagnostic EKG on pharm stress test.      Cardiovascular lower extremity venous duplex 09/04/2012    Acute, non-occlusive DVT in CFV on right. No DVT on left. No superficial thrombosis bilaterally.  Cardiovascular upper extremity venous duplex 08/27/2012    DVT in axillary vein on left. Left subclavian was not visualized.     Chronic anemia 9/5/2012    Chronic systolic heart failure (HCC)     Decreased calculated glomerular filtration rate (GFR) 3/30/2017    Calculated GFR equivalent to that of CKD stage 3 = 30-59 ml/min    Diabetic neuropathy associated with type 2 diabetes mellitus ([facility] [address] 6/28/2011    Difficult airway for intubation 08/22/2012    see anesthesia airway note    Dyslipidemia 6/28/2011    Gout     History of complete heart block 6/28/2011    History of Coumadin therapy Anticoagulation for DVT of the LUE; Discontinued on 3/30/2017    History of deep venous thrombosis 9/5/2012    Left upper extremity    History of pyelonephritis 3/30/2017    Left bundle branch block (LBBB) on electrocardiogram 6/28/2011    Nonischemic cardiomyopathy ([facility] [address] 6/28/2011    Obesity (BMI 35.0-39.9 without comorbidity) ([facility] [address] 3/13/2017    Obstructive sleep apnea on CPAP 2/7/2012    Psoas abscess, right ([facility] [address] 4/20/2017    Psoas hematoma, right, secondary to anticoagulant therapy 3/30/2017    Type 2 diabetes mellitus with diabetic neuropathy ([facility] [address] 6/28/2011         Patient taking anticoagulants no     ASSESSMENT:    Changes in Assessment Throughout Shift: no     Patient has Central Line: yes Reasons if yes: poor veins   Patient has Krueger Cath: yes Reasons if yes: retention      Last Vitals:     Vitals:    05/21/17 1608 05/21/17 1656 05/21/17 2050 05/22/17 0030   BP: 153/80  149/74 128/70   Pulse: 75  73    Resp: 19  17 18   Temp: 98.1 °F (36.7 °C)  98.4 °F (36.9 °C) 98.7 °F (37.1 °C)   TempSrc:       SpO2: 100% 100% 97% 95%   Weight:       Height:            IV and DRAINS (will only show if present)   Drain 8.5 FR Right psoas muscle drain 05/10/17 Right Other (comment)-Site Assessment: Clean, dry, & intact  [REMOVED] Drain 04/20/17 Right Other (comment)-Site Assessment: Clean, dry, & intact  [REMOVED] Peripheral IV 04/20/17 Left Arm-Site Assessment: Clean, dry, & intact  [REMOVED] Peripheral IV 04/20/17 Right Antecubital-Site Assessment: Clean, dry, & intact  [REMOVED] Triple Lumen Triple Lumen CVL 04/20/17 Left Other(comment)-Site Assessment: Clean, dry, & intact  [REMOVED] Peripheral IV 04/21/17 Left Arm-Site Assessment: Clean, dry, & intact  [REMOVED] Peripheral IV 04/25/17 Left Forearm-Site Assessment: Clean, dry, & intact  PICC Double Lumen 77/68/17 Right;Basilic-Site Assessment: Clean, dry, & intact     WOUND (if present)   Wound Type:  none   Dressing present: yes sacrum   Wound Concerns/Notes:  none     PAIN    Pain Assessment    Pain Intensity 1: 0 (05/22/17 0050)    Pain Location 1: Shoulder    Pain Intervention(s) 1: Medication (see MAR)    Patient Stated Pain Goal: 0  o Interventions for Pain:  tylenol  o Intervention effective: yes  o Time of last intervention: 1813   o Reassessment Completed: yes      Last 3 Weights:  Last 3 Recorded Weights in this Encounter    05/18/17 0737 05/19/17 0918 05/20/17 1539   Weight: 117 kg (258 lb) 115.6 kg (254 lb 13.6 oz) 114.2 kg (251 lb 12.3 oz)     Weight change:      INTAKE/OUPUT    Current Shift:      Last three shifts: 05/20 0701 - 05/21 1900  In: 200 [I.V.:200]  Out: 2351 [Urine:2350]     LAB RESULTS     Recent Labs      05/21/17   0511 05/20/17   0323  05/19/17   0335   WBC  13.7*  13.0  11.1   HGB  8.5*  8.4*  8.1*   HCT  26.1*  25.9*  25.0*   PLT  207  226  246        Recent Labs      05/21/17   0511  05/20/17   0323  05/19/17   0335   NA  137  138  139   K  3.8  3.4*  3.8   GLU  111*  126*  100*   BUN  17  21*  22*   CREA  1.15  1.45*  1.72*   CA  8.6  8.7  8.5   MG   --   1.8  1.7       RECOMMENDATIONS AND DISCHARGE PLANNING     1. Pending tests/procedures/ Plan of Care or Other Needs: Labs     2. Discharge plan for patient and Needs/Barriers: Rehab    3. Estimated Discharge Date: TBD Posted on Whiteboard in Patients Room: no      4. The patient's care plan was reviewed with the oncoming nurse. \"HEALS\" SAFETY CHECK      Fall Risk    Total Score: 3    Safety Measures: Safety Measures: Bed/Chair alarm on, Bed/Chair-Wheels locked, Bed in low position, Call light within reach    A safety check occurred in the patient's room between off going nurse and oncoming nurse listed above.     The safety check included the below items  Area Items   H  High Alert Medications - Verify all high alert medication drips (heparin, PCA, etc.)   E  Equipment - Suction is set up for ALL patients (with yanker)  - Red plugs utilized for all equipment (IV pumps, etc.)  - WOWs wiped down at end of shift.  - Room stocked with oxygen, suction, and other unit-specific supplies   A  Alarms - Bed alarm is set for fall risk patients  - Ensure chair alarm is in place and activated if patient is up in a chair   L  Lines - Check IV for any infiltration  - Krueger bag is empty if patient has a Krueger   - Tubing and IV bags are labeled   S  Safety   - Room is clean, patient is clean, and equipment is clean. - Hallways are clear from equipment besides carts. - Fall bracelet on for fall risk patients  - Ensure room is clear and free of clutter  - Suction is set up for ALL patients (with yanker)  - Hallways are clear from equipment besides carts.    - Isolation precautions followed, supplies available outside room, sign posted     Kayleigh Brown RN

## 2017-05-23 LAB
ALBUMIN SERPL BCP-MCNC: 2 G/DL (ref 3.4–5)
ALBUMIN/GLOB SERPL: 0.5 {RATIO} (ref 0.8–1.7)
ALP SERPL-CCNC: 157 U/L (ref 45–117)
ALT SERPL-CCNC: 22 U/L (ref 13–56)
ANION GAP BLD CALC-SCNC: 8 MMOL/L (ref 3–18)
AST SERPL W P-5'-P-CCNC: 33 U/L (ref 15–37)
BASOPHILS # BLD AUTO: 0 K/UL (ref 0–0.06)
BASOPHILS # BLD: 0 % (ref 0–3)
BILIRUB DIRECT SERPL-MCNC: 0.2 MG/DL (ref 0–0.2)
BILIRUB SERPL-MCNC: 0.5 MG/DL (ref 0.2–1)
BUN SERPL-MCNC: 13 MG/DL (ref 7–18)
BUN/CREAT SERPL: 12 (ref 12–20)
CALCIUM SERPL-MCNC: 8.5 MG/DL (ref 8.5–10.1)
CHLORIDE SERPL-SCNC: 100 MMOL/L (ref 100–108)
CO2 SERPL-SCNC: 30 MMOL/L (ref 21–32)
CREAT SERPL-MCNC: 1.09 MG/DL (ref 0.6–1.3)
DIFFERENTIAL METHOD BLD: ABNORMAL
EOSINOPHIL # BLD: 0 K/UL (ref 0–0.4)
EOSINOPHIL NFR BLD: 0 % (ref 0–5)
ERYTHROCYTE [DISTWIDTH] IN BLOOD BY AUTOMATED COUNT: 17.3 % (ref 11.6–14.5)
GLOBULIN SER CALC-MCNC: 4 G/DL (ref 2–4)
GLUCOSE BLD STRIP.AUTO-MCNC: 102 MG/DL (ref 70–110)
GLUCOSE BLD STRIP.AUTO-MCNC: 86 MG/DL (ref 70–110)
GLUCOSE BLD STRIP.AUTO-MCNC: 87 MG/DL (ref 70–110)
GLUCOSE BLD STRIP.AUTO-MCNC: 96 MG/DL (ref 70–110)
GLUCOSE SERPL-MCNC: 77 MG/DL (ref 74–99)
HCT VFR BLD AUTO: 26.2 % (ref 35–45)
HGB BLD-MCNC: 8.5 G/DL (ref 12–16)
LYMPHOCYTES # BLD AUTO: 19 % (ref 20–51)
LYMPHOCYTES # BLD: 2.6 K/UL (ref 0.8–3.5)
MCH RBC QN AUTO: 25.9 PG (ref 24–34)
MCHC RBC AUTO-ENTMCNC: 32.4 G/DL (ref 31–37)
MCV RBC AUTO: 79.9 FL (ref 74–97)
MONOCYTES # BLD: 0.9 K/UL (ref 0–1)
MONOCYTES NFR BLD AUTO: 7 % (ref 2–9)
MYELOCYTES NFR BLD MANUAL: 1 %
NEUTS BAND NFR BLD MANUAL: 3 % (ref 0–5)
NEUTS SEG # BLD: 9.9 K/UL (ref 1.8–8)
NEUTS SEG NFR BLD AUTO: 70 % (ref 42–75)
PLATELET # BLD AUTO: 193 K/UL (ref 135–420)
PLATELET COMMENTS,PCOM: ABNORMAL
PMV BLD AUTO: 8.3 FL (ref 9.2–11.8)
POTASSIUM SERPL-SCNC: 3.5 MMOL/L (ref 3.5–5.5)
POTASSIUM SERPL-SCNC: 3.5 MMOL/L (ref 3.5–5.5)
PROT SERPL-MCNC: 6 G/DL (ref 6.4–8.2)
RBC # BLD AUTO: 3.28 M/UL (ref 4.2–5.3)
RBC MORPH BLD: ABNORMAL
SODIUM SERPL-SCNC: 138 MMOL/L (ref 136–145)
WBC # BLD AUTO: 13.5 K/UL (ref 4.6–13.2)

## 2017-05-23 PROCEDURE — 74011000258 HC RX REV CODE- 258: Performed by: INTERNAL MEDICINE

## 2017-05-23 PROCEDURE — 36592 COLLECT BLOOD FROM PICC: CPT

## 2017-05-23 PROCEDURE — 74011250637 HC RX REV CODE- 250/637: Performed by: INTERNAL MEDICINE

## 2017-05-23 PROCEDURE — 97530 THERAPEUTIC ACTIVITIES: CPT

## 2017-05-23 PROCEDURE — 84132 ASSAY OF SERUM POTASSIUM: CPT | Performed by: INTERNAL MEDICINE

## 2017-05-23 PROCEDURE — 77010033678 HC OXYGEN DAILY

## 2017-05-23 PROCEDURE — 74011250636 HC RX REV CODE- 250/636: Performed by: HOSPITALIST

## 2017-05-23 PROCEDURE — 65270000032 HC RM SEMIPRIVATE

## 2017-05-23 PROCEDURE — 36415 COLL VENOUS BLD VENIPUNCTURE: CPT | Performed by: INTERNAL MEDICINE

## 2017-05-23 PROCEDURE — 97168 OT RE-EVAL EST PLAN CARE: CPT

## 2017-05-23 PROCEDURE — 80076 HEPATIC FUNCTION PANEL: CPT | Performed by: INTERNAL MEDICINE

## 2017-05-23 PROCEDURE — 65270000029 HC RM PRIVATE

## 2017-05-23 PROCEDURE — 94640 AIRWAY INHALATION TREATMENT: CPT

## 2017-05-23 PROCEDURE — 74011000250 HC RX REV CODE- 250: Performed by: PHYSICIAN ASSISTANT

## 2017-05-23 PROCEDURE — 82962 GLUCOSE BLOOD TEST: CPT

## 2017-05-23 PROCEDURE — 74011250637 HC RX REV CODE- 250/637: Performed by: HOSPITALIST

## 2017-05-23 PROCEDURE — 85025 COMPLETE CBC W/AUTO DIFF WBC: CPT | Performed by: INTERNAL MEDICINE

## 2017-05-23 PROCEDURE — 74011250636 HC RX REV CODE- 250/636: Performed by: INTERNAL MEDICINE

## 2017-05-23 PROCEDURE — 92526 ORAL FUNCTION THERAPY: CPT

## 2017-05-23 PROCEDURE — 74011636637 HC RX REV CODE- 636/637: Performed by: FAMILY MEDICINE

## 2017-05-23 RX ADMIN — FAMOTIDINE 20 MG: 20 TABLET ORAL at 10:28

## 2017-05-23 RX ADMIN — PRAVASTATIN SODIUM 40 MG: 20 TABLET ORAL at 22:58

## 2017-05-23 RX ADMIN — IPRATROPIUM BROMIDE AND ALBUTEROL SULFATE 3 ML: .5; 3 SOLUTION RESPIRATORY (INHALATION) at 09:04

## 2017-05-23 RX ADMIN — LACTOBACILLUS TAB 1 TABLET: TAB at 09:00

## 2017-05-23 RX ADMIN — LACTOBACILLUS TAB 1 TABLET: TAB at 18:00

## 2017-05-23 RX ADMIN — ALLOPURINOL 50 MG: 100 TABLET ORAL at 10:27

## 2017-05-23 RX ADMIN — IPRATROPIUM BROMIDE AND ALBUTEROL SULFATE 3 ML: .5; 3 SOLUTION RESPIRATORY (INHALATION) at 14:28

## 2017-05-23 RX ADMIN — ACETAMINOPHEN 500 MG: 500 TABLET ORAL at 06:40

## 2017-05-23 RX ADMIN — CEFTRIAXONE 2 G: 2 INJECTION, POWDER, FOR SOLUTION INTRAMUSCULAR; INTRAVENOUS at 12:00

## 2017-05-23 RX ADMIN — IPRATROPIUM BROMIDE AND ALBUTEROL SULFATE 3 ML: .5; 3 SOLUTION RESPIRATORY (INHALATION) at 19:54

## 2017-05-23 RX ADMIN — Medication 10 ML: at 14:00

## 2017-05-23 RX ADMIN — CHOLECALCIFEROL TAB 25 MCG (1000 UNIT) 2000 UNITS: 25 TAB at 09:00

## 2017-05-23 RX ADMIN — FAMOTIDINE 20 MG: 20 TABLET ORAL at 18:00

## 2017-05-23 RX ADMIN — ACETAMINOPHEN 650 MG: 500 TABLET ORAL at 10:40

## 2017-05-23 RX ADMIN — FLUCONAZOLE 200 MG: 200 TABLET ORAL at 09:00

## 2017-05-23 RX ADMIN — MIDODRINE HYDROCHLORIDE 10 MG: 2.5 TABLET ORAL at 01:33

## 2017-05-23 RX ADMIN — MIDODRINE HYDROCHLORIDE 10 MG: 2.5 TABLET ORAL at 17:00

## 2017-05-23 RX ADMIN — MIDODRINE HYDROCHLORIDE 10 MG: 2.5 TABLET ORAL at 10:28

## 2017-05-23 RX ADMIN — INSULIN GLARGINE 7 UNITS: 100 INJECTION, SOLUTION SUBCUTANEOUS at 09:00

## 2017-05-23 RX ADMIN — Medication 10 ML: at 07:21

## 2017-05-23 RX ADMIN — Medication 10 ML: at 22:59

## 2017-05-23 RX ADMIN — DOCUSATE SODIUM 100 MG: 100 CAPSULE, LIQUID FILLED ORAL at 09:00

## 2017-05-23 RX ADMIN — ONDANSETRON 4 MG: 2 INJECTION INTRAMUSCULAR; INTRAVENOUS at 10:53

## 2017-05-23 RX ADMIN — Medication 2 TABLET: at 18:00

## 2017-05-23 RX ADMIN — FOLIC ACID 1 MG: 1 TABLET ORAL at 09:00

## 2017-05-23 RX ADMIN — ONDANSETRON 4 MG: 2 INJECTION INTRAMUSCULAR; INTRAVENOUS at 20:59

## 2017-05-23 NOTE — PROGRESS NOTES
SUBJECTIVE:     Patient is feeling better. Dry cough present. No chest or abdominal pain. No foot pain. Sat up on bed today. No headaches or dizziness.      OBJECTIVE:     Visit Vitals    /83 (BP 1 Location: Left arm, BP Patient Position: At rest)    Pulse 76    Temp 97.8 °F (36.6 °C)    Resp 20    Ht 5' 7\" (1.702 m)    Wt 111.8 kg (246 lb 7.6 oz)    SpO2 96%    Breastfeeding No    BMI 38.6 kg/m2     Neck: no JVD. RS: Diminished in bases, no wheezes. CVS: RRR  GI: ND, BS +, NT   Extremities: TRACE  pedal edema  CNS: motor strength 0-1/5 in both LEs [L > R]. 5/5 un UEs. Normal sensation to touch in both LEs. No tremors. Skin:  Bilateral foot ulcers, Right greater than left. General: NAD, AWAKE, Follows commands     ASSESSMENT:     1. Sepsis secondary to infected right psoas hematoma/epidural abscess with neurological compromise resulting in paraplegia. S/p CT guided right new psoas muscle drainage catheter placement and s/p drain removal on 5/22/17. 2. Paraplegia since 4/20 likely due to presumed spinal cord infarct/septic thrombophlebitis. 3. H/o intermittent pain at the site of abdominal pacemaker, currently resolved  4. Heart block s/p AICD 2005 with upgrade to BiV later that year, removed however in 2012 due to trauma and infection. New biventricular pacemaker LUQ 9/2012 by Dr. 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 5. Elevated troponin likely demand ischemia in setting of sepsis. No ACS. 6. H/p transient nonischemic CMY with EF 45%  7. Acute kidney injury due to neurogenic bladder and Component of ATN. Resolved   8. Acute on chronic anemia s/p PRBCs  9. Diabetes mellitus. 10.Dyslipidemia. 6. H/o HTN. 12. LBBB. 13. HALI on CPAP. 14. Left shoulder pain  15. Atypical Chest pain   16. Hypoxia due to atelectasis  17. Bilateral LE DVT s/p IVC  18. Bibasilar atelectasis   19. Severe protein malnutrition   20.  Bilateral foot ulcers, hospital acuired     PLAN:     Cont antibiotic per ID  Cont current management. Pulmonology signed off.   Asked patient to do IS and cont nebs  Cont perez until becomes functional  Podiatrist consulted  Talked to CM to initiate ARU discharge process     --> TOTAL TIME GREATER THAN 35 MINUTES    CMP:   Lab Results   Component Value Date/Time     05/23/2017 05:28 AM    K 3.5 05/23/2017 05:28 AM     05/23/2017 05:28 AM    CO2 30 05/23/2017 05:28 AM    AGAP 8 05/23/2017 05:28 AM    GLU 77 05/23/2017 05:28 AM    BUN 13 05/23/2017 05:28 AM    CREA 1.09 05/23/2017 05:28 AM    GFRAA >60 05/23/2017 05:28 AM    GFRNA 50 (L) 05/23/2017 05:28 AM    CA 8.5 05/23/2017 05:28 AM    ALB 2.0 (L) 05/23/2017 05:28 AM    TP 6.0 (L) 05/23/2017 05:28 AM    GLOB 4.0 05/23/2017 05:28 AM    AGRAT 0.5 (L) 05/23/2017 05:28 AM    SGOT 33 05/23/2017 05:28 AM    ALT 22 05/23/2017 05:28 AM     CBC:   Lab Results   Component Value Date/Time    WBC 13.5 (H) 05/23/2017 05:28 AM    HGB 8.5 (L) 05/23/2017 05:28 AM    HCT 26.2 (L) 05/23/2017 05:28 AM     05/23/2017 05:28 AM

## 2017-05-23 NOTE — PROGRESS NOTES
F/u dysphagia therapy took place with recs of diet advancement to soft solid with thin liquids. Full report to follow.      Thank you for this referral.    Yvonne Murray M.S. CCC-SLP/L  Speech-Language Pathologist

## 2017-05-23 NOTE — ROUTINE PROCESS
Bedside and Verbal shift change report given to Divya Chicas RN (oncoming nurse) by Judd Hyde RN (offgoing nurse). Report included the following information SBAR, Kardex, MAR and Recent Results. SITUATION:    Code Status: Full Code  Reason for Admission: Acute paraplegia   Acute paraplegia [facility] day: 35   Problem List:       Hospital Problems  Date Reviewed: 4/20/2017          Codes Class Noted POA    * (Principal)Acute paraplegia ([facility] [address] ICD-10-CM: G82.20  ICD-9-CM: 344.1  4/20/2017 Yes        Sepsis ([facility] [address] ICD-10-CM: A41.9  ICD-9-CM: 038.9, 995.91  4/20/2017 Yes        Psoas abscess, right ([facility] [address] ICD-10-CM: S44.31  ICD-9-CM: 567.31  4/20/2017 Yes        Krueger catheter in place on admission ICD-10-CM: Z96.0  ICD-9-CM: V45.89  4/20/2017 Yes        Urinary tract infection due to Enterococcus ICD-10-CM: N39.0, B95.2  ICD-9-CM: 599.0, 041.04  4/20/2017 Yes        Group B streptococcal infection ICD-10-CM: A49.1  ICD-9-CM: 041.02  4/20/2017 Yes        History of Coumadin therapy ICD-10-CM: Z79.01  ICD-9-CM: V58.61  Unknown Yes    Overview Signed 4/6/2017 10:35 PM by Rosa Manrique MD     Anticoagulation for chronic atrial fibrillation; Discontinued on 3/30/2017             Decreased calculated glomerular filtration rate (GFR) ICD-10-CM: R94.4  ICD-9-CM: 794.4  3/30/2017 Yes    Overview Signed 4/6/2017  5:47 PM by Rosa Manrique MD     Calculated GFR equivalent to that of CKD stage 3 = 30-59 ml/min             Iliopsoas muscle hematoma ICD-10-CM: S70.10XA  ICD-9-CM: 924.00  3/30/2017 Yes        Psoas hematoma, right, secondary to anticoagulant therapy ICD-10-CM: S30. 1XXA  ICD-9-CM: 924.9, E934.2  3/30/2017 Yes        Impaired mobility and ADLs ICD-10-CM: Z74.09  ICD-9-CM: 799.89  3/30/2017 Yes        Benign hypertensive heart disease with systolic CHF, NYHA class 2 (HCC) (Chronic) ICD-10-CM: I11.0, I50.20  ICD-9-CM: 402.11, 428.20, 428.0  9/5/2012 Yes        [facility] ICD-10-CM: T82. 7XXA  ICD-9-CM: 996.61  8/20/2012 Yes    Overview Signed 8/20/2012  6:13 PM by Ana Poe MD     S/p explant 4 leads 8/20/12             Type 2 diabetes mellitus with diabetic neuropathy (HCC) (Chronic) ICD-10-CM: E11.40  ICD-9-CM: 250.60, 357.2  6/28/2011 Yes              BACKGROUND:    Past Medical History:   Past Medical History:   Diagnosis Date    Acute paraplegia ([facility] [address] 4/20/2017    Benign hypertensive heart disease with systolic CHF, NYHA class 2 ([address] 9/5/2012    Biventricular implantable cardioverter-defibrillator in situ 04/28/2005    Upgraded to BiV AICD; gen change 4/2008; pocket revision 10/2009; Abdominal - done on 8/22/2012 by Dr. Desmond Aguilar Cardiac cath 08/15/1996    Patent coronaries. Elev LVEDP. EF 50-55%.  Cardiac echocardiogram 06/23/2015    Ltd study. EF 45-50%. Mild, diffuse hypk. Severe apical hypk. No mass or thrombus was clearly identified, although imaging was suboptimal.      Cardiac nuclear imaging test 06/19/2015    Fixed distal apical, distal septal defect more likely due to RV pacing than prior infarct. No ischemia. EF 46%. RWMA c/w RV pacing. Nondiagnostic EKG on pharm stress test.      Cardiovascular lower extremity venous duplex 09/04/2012    Acute, non-occlusive DVT in CFV on right. No DVT on left. No superficial thrombosis bilaterally.  Cardiovascular upper extremity venous duplex 08/27/2012    DVT in axillary vein on left. Left subclavian was not visualized.     Chronic anemia 9/5/2012    Chronic systolic heart failure (HCC)     Decreased calculated glomerular filtration rate (GFR) 3/30/2017    Calculated GFR equivalent to that of CKD stage 3 = 30-59 ml/min    Diabetic neuropathy associated with type 2 diabetes mellitus ([facility] [address] 6/28/2011    Difficult airway for intubation 08/22/2012    see anesthesia airway note    Dyslipidemia 6/28/2011    Gout     History of complete heart block 6/28/2011    History of Coumadin therapy Anticoagulation for DVT of the LUE; Discontinued on 3/30/2017    History of deep venous thrombosis 9/5/2012    Left upper extremity    History of pyelonephritis 3/30/2017    Left bundle branch block (LBBB) on electrocardiogram 6/28/2011    Nonischemic cardiomyopathy ([facility] [address] 6/28/2011    Obesity (BMI 35.0-39.9 without comorbidity) ([facility] [address] 3/13/2017    Obstructive sleep apnea on CPAP 2/7/2012    Psoas abscess, right ([facility] [address] 4/20/2017    Psoas hematoma, right, secondary to anticoagulant therapy 3/30/2017    Type 2 diabetes mellitus with diabetic neuropathy ([facility] [address] 6/28/2011         Patient taking anticoagulants no     ASSESSMENT:    Changes in Assessment Throughout Shift: no     Patient has Central Line: yes Reasons if yes: poor veins   Patient has Krueger Cath: yes Reasons if yes: retention      Last Vitals:     Vitals:    05/22/17 2102 05/23/17 0000 05/23/17 0400 05/23/17 0722   BP: 143/71 136/71 142/67    Pulse: 79 79 78    Resp: 16 18 17    Temp: 98.8 °F (37.1 °C) 99.2 °F (37.3 °C) 98.6 °F (37 °C)    TempSrc:       SpO2: 97% 98% 99%    Weight:    111.8 kg (246 lb 7.6 oz)   Height:            IV and DRAINS (will only show if present)   [REMOVED] Drain 8.5 FR Right psoas muscle drain 05/10/17 Right Other (comment)-Site Assessment: Clean, dry, & intact  [REMOVED] Drain 04/20/17 Right Other (comment)-Site Assessment: Clean, dry, & intact  [REMOVED] Peripheral IV 04/20/17 Left Arm-Site Assessment: Clean, dry, & intact  [REMOVED] Peripheral IV 04/20/17 Right Antecubital-Site Assessment: Clean, dry, & intact  [REMOVED] Triple Lumen Triple Lumen CVL 04/20/17 Left Other(comment)-Site Assessment: Clean, dry, & intact  [REMOVED] Peripheral IV 04/21/17 Left Arm-Site Assessment: Clean, dry, & intact  [REMOVED] Peripheral IV 04/25/17 Left Forearm-Site Assessment: Clean, dry, & intact  PICC Double Lumen 42/03/97 Right;Basilic-Site Assessment: Clean, dry, & intact     WOUND (if present)   Wound Type:  none   Dressing present: yes sacrum   Wound Concerns/Notes:  none     PAIN    Pain Assessment    Pain Intensity 1: 6 (05/23/17 0641)    Pain Location 1: Leg    Pain Intervention(s) 1: Medication (see MAR)    Patient Stated Pain Goal: 0  o Interventions for Pain:  tylenol  o Intervention effective: yes  o Time of last intervention: 0640  o Reassessment Completed: yes      Last 3 Weights:  Last 3 Recorded Weights in this Encounter    05/19/17 0918 05/20/17 1539 05/23/17 0722   Weight: 115.6 kg (254 lb 13.6 oz) 114.2 kg (251 lb 12.3 oz) 111.8 kg (246 lb 7.6 oz)     Weight change:      INTAKE/OUPUT    Current Shift:      Last three shifts: 05/21 1901 - 05/23 0700  In: 700 [P.O.:700]  Out: 3325 [Urine:3325]     LAB RESULTS     Recent Labs      05/23/17   0528  05/22/17   0449  05/21/17   0511   WBC  13.5*  11.3  13.7*   HGB  8.5*  8.4*  8.5*   HCT  26.2*  26.0*  26.1*   PLT  193  188  207        Recent Labs      05/23/17   0528  05/22/17   0449  05/21/17   0511   NA  138  139  137   K  3.5  3.6  3.8   GLU  77  79  111*   BUN  13  14  17   CREA  1.09  1.08  1.15   CA  8.5  8.5  8.6       RECOMMENDATIONS AND DISCHARGE PLANNING     1. Pending tests/procedures/ Plan of Care or Other Needs: Labs, perez cath care    2. Discharge plan for patient and Needs/Barriers: Rehab    3. Estimated Discharge Date: TBD Posted on Whiteboard in Patients Room: no      4. The patient's care plan was reviewed with the oncoming nurse. \"HEALS\" SAFETY CHECK      Fall Risk    Total Score: 3    Safety Measures: Safety Measures: Bed/Chair-Wheels locked, Bed in low position, Call light within reach    A safety check occurred in the patient's room between off going nurse and oncoming nurse listed above.     The safety check included the below items  Area Items   H  High Alert Medications - Verify all high alert medication drips (heparin, PCA, etc.)   E  Equipment - Suction is set up for ALL patients (with yanker)  - Red plugs utilized for all equipment (IV pumps, etc.)  - WOWs wiped down at end of shift.  - Room stocked with oxygen, suction, and other unit-specific supplies   A  Alarms - Bed alarm is set for fall risk patients  - Ensure chair alarm is in place and activated if patient is up in a chair   L  Lines - Check IV for any infiltration  - Krueger bag is empty if patient has a Krueger   - Tubing and IV bags are labeled   S  Safety   - Room is clean, patient is clean, and equipment is clean. - Hallways are clear from equipment besides carts. - Fall bracelet on for fall risk patients  - Ensure room is clear and free of clutter  - Suction is set up for ALL patients (with yanker)  - Hallways are clear from equipment besides carts.    - Isolation precautions followed, supplies available outside room, sign posted     Tami Jimenez RN

## 2017-05-23 NOTE — PROGRESS NOTES
Problem: Mobility Impaired (Adult and Pediatric)  Goal: *Acute Goals and Plan of Care (Insert Text)  STGs to be addressed within 3 days:  1. Bed mobility: Supine to sit to supine MaxAx1 with HR for meals. 2. Activity tolerance: Tolerate EOB > 15 minutes for ADLs. 3. Transfers: SPT-->to chair max/mod A with LRAD for ADLs. 4. Pt demo fair seated balance in preparation for functional transfers. LTGs to be addressed within 7 days:  1. Transfers: SB-->to chair/wc max/mod A for ADLs. 2. Activity tolerance: Tolerated > 1 hr in chair for change of position. 3. Patient Education: Independent with HEP for home safety. Outcome: Progressing Towards Goal  PHYSICAL THERAPY TREATMENT     Patient: Nu Bueno (67 y.o. female)  Date: 5/23/2017  Diagnosis: Acute paraplegia  Acute paraplegia (HCC) Acute paraplegia [facility])  Precautions: Fall, Skin   Chart, physical therapy assessment, plan of care and goals were reviewed. ASSESSMENT:  Patient found supine in bed willing to work with rehab. Pt seen with OT to maximize safety of pt and staff. In supine pt presents with exaggerated bilateral ER of hips left > right. Skin break down noted on pad of feet, under left 5th MTP joint and right 1st MTP joint. Pt would greatly benefit from multi-podus boot to prevent contracture due to prolonged bed rest. Pt sat At EOB about 12 minutes this tx. Pt requires between [address] and Max A at all time to remain sitting. Pt presents with decreased LE strength requiring mod a for LEs for movement in bed. Pt returned to supine and left with needs in reach. Education: sitting balance, LE therex. Progression toward goals:  [ ]      Improving appropriately and progressing toward goals  [X]      Improving slowly and progressing toward goals  [ ]      Not making progress toward goals and plan of care will be adjusted       PLAN:  Patient continues to benefit from skilled intervention to address the above impairments.   Continue treatment per established plan of care. Discharge Recommendations:  Rehab / SNF  Further Equipment Recommendations for Discharge:  rolling walker       SUBJECTIVE:   Patient stated This is the most I've moved in a long time.       OBJECTIVE DATA SUMMARY:   Critical Behavior:  Neurologic State: Alert  Orientation Level: Oriented X4  Cognition: Appropriate for age attention/concentration, Follows commands  Safety/Judgement: Decreased insight into deficits, Decreased awareness of need for assistance  Functional Mobility Training:  Bed Mobility:  Supine to Sit: Maximum assistance;Assist x2  Sit to Supine: Maximum assistance;Assist x2  Scooting: Stand-by asssistance; Additional time (bed trendelenburg)  Balance:  Sitting: Impaired  Sitting - Static: Poor (constant support)  Sitting - Dynamic: Poor (constant support)     Therapeutic Exercises:   Reviewed. Pain:  Pre tx pain: 0  Post tx pain: 0  Pain Scale 1: Numeric (0 - 10)  Pain Intensity 1: 0  Pain Location 1: Leg  Pain Orientation 1: Left;Right  Pain Description 1: Aching  Activity Tolerance:   fair  Please refer to the flowsheet for vital signs taken during this treatment.   After treatment:   [ ] Patient left in no apparent distress sitting up in chair  [X] Patient left in no apparent distress in bed  [X] Call bell left within reach  [ ] Nursing notified  [ ] Caregiver present  [ ] Bed alarm activated      Aman Qureshi PTA   Time Calculation: 31 mins

## 2017-05-23 NOTE — PROGRESS NOTES
[facility]  Pulmonary, Critical Care, and Sleep Medicine    Name: Kalin Boyer MRN: [id number]   : 1950 Hospital: [address] [place] [address]   Date: 2017        Subjective/Interval History:     Patient is a 77 y.o. female w/hx of HTN, CHF, heart block s/p pacer, multiple vascular procedures in neck veins with residual stenoses, prior Hx of DVT, DM, Afib, recent admission for Rt psoas hematoma. Was readmitted on 17 for acute paraplegia, fevers, sweats. Repeat CT demonstrated increased size of psoas fluid collection. Labs notable for leukocytosis and LISBETH. Pt brought to IR and 300ml of pus aspirated. Hypotensive upon return to floor, and still so after ~1500 ml IVF. Pt was transferred to ICU on 17 for hypotension s/p procedure; recovered with pressor support and fluids. Pt was then transferred out of ICU floor. Last seen by Pulmonary /ICU - 17; reconsulted on 17 2/2 SOB x2-3 days without clear cause. VQ scan - low probability for PE; PVL's (+) DVT in R Common Femoral Vein & L popliteal vein. Pt then underwent emergent IVC placement d/t inability to anticoagulate using heparin 2/2 hx Psoas muscle hematoma. 05/15/17: Transferred to ICU for altered mental status & Hypotension  17: Transferred back to Floor - 4N    17:   - Psoas muscle drain removed by IR yesterday (17)  -Alert, awake, resting comfortably on 7LPM NC with  at bedside; PT/OT also at bedside. Unsure why pt is on 7LPM, pt denies increased in SOB; actually states she feels better today and continues to improve. Advised PT/OT to titrate down O2 to 2LPM, and monitor - pulse ox machine in room - SpO2 currently 100%  - Overall weakness improving with PT/OT  - Overall, appears much improved  - Improving PO intake  - Denies any SOB, substernal CP, palpitations, abd pain, N/V/D, F/C, H/A    ROS:Pertinent items are noted in HPI.     Objective:   Vital Signs:    Visit Vitals    BP 137/83 (BP 1 Location: Left arm, BP Patient Position: At rest)    Pulse 76    Temp 97.8 °F (36.6 °C)    Resp 20    Ht 5' 7\" (1.702 m)    Wt 111.8 kg (246 lb 7.6 oz)    SpO2 95%    Breastfeeding No    BMI 38.6 kg/m2       O2 Device: Nasal cannula   O2 Flow Rate (L/min): 2 l/min   Temp (24hrs), Av.4 °F (36.9 °C), Min:97.5 °F (36.4 °C), Max:99.2 °F (37.3 °C)       Intake/Output:   Last shift:       07 -  1900  In: 240 [P.O.:240]  Out: 750 [Urine:750]  Last 3 shifts: 1901 -  0700  In: 700 [P.O.:700]  Out: 3325 [Urine:3325]    Intake/Output Summary (Last 24 hours) at 17 1322  Last data filed at 17 1000   Gross per 24 hour   Intake              700 ml   Output             3525 ml   Net            -2825 ml        Physical Exam:    General: Alert, awake, resting comfortably on 7LPM NC; titrated down to 2LPM without SpO2 drop. Likely not turned back down from breathing treatment this morning   HEENT: Anicteric sclerae; pink palpebral conjunctivae; mucosa moist   Resp: Symmetrical chest rise; mod AE bilat; slightly diminished bibasilar - improved from previous; CTAB; no wheezes/rhonchi/rales noted. CV: RRR, S1S2 normal; No m/r/g   Abdomen: Soft, non-tender; (+) B. S x4; No organomegaly   Extremity: +1 BLE edema; No cyanosis/clubbing noted; peripheral pulses 2+ and symmetrical   Neuro: Alert, non-focal   Skin: Warm, dry; No rashes/lesions noted. PICC L upper arm.          DATA:  Labs:  Recent Labs      17   0528  17   0449  17   0511   WBC  13.5*  11.3  13.7*   HGB  8.5*  8.4*  8.5*   HCT  26.2*  26.0*  26.1*   PLT  193  188  207     Recent Labs      17   0528  17   0449  17   0511   NA  138  139  137   K  3.5  3.6  3.8   CL  100  102  103   CO2  30  29  29   GLU  77  79  111*   BUN  13  14  17   CREA  1.09  1.08  1.15   CA  8.5  8.5  8.6   ALB  2.0*  2.1*  2.1*   SGOT  33  39*  42*   ALT  22  22  26       PFT:   N/A Echo [04/21/17]:  SUMMARY:  Left ventricle: Size was at the upper limits of normal. Systolic function  was mildly reduced by visual assessment. Ejection fraction was estimated  to be 45 %. There was severe hypokinesis of the basal-mid inferoseptal,  apical septal, and apical wall(s). Structure noted in the apex as  mentioned on previous studies. Possible muscle band vs fibrinous mass. Imaging:  No new imaging    CT ABD PELV WO CONT [05/16/17]: IMPRESSION:  1. Smaller right psoas muscle implying smaller intramuscular fluid collection and less edema/inflammation. Draining catheter is still in place. Limited evaluation without IV contrast. Slightly smaller presacral fluid with no discrete collection. 2. Less severe left lower lobe pulmonary consolidation but more severe right lower lobe consolidation. No pleural effusion. 3. Worsening anasarca/subcutaneous edema on both sides. 4. More fluid in the ascending and transverse colon, colitis or diarrheal disease? [x]I have personally reviewed the patients radiographs  [x]Radiographs reviewed with radiologist   [x]No change from prior, tubes and lines in adequate position  []Improved   [x]Worsening    IMPRESSION:   · R sided Pleural effusion - Slightly worse on CT (05/16/17) - will con't to monitor  · (+) DVT -  R Common Femoral Vein and L popliteal vein; s/p IVC filter. V/Q scan on 5/11/17: low probability. Small PE is possible but doubtful currently and given current clinical status not a candidate for systemic anticoagulation. · Hypoxia d/t atelectasis - Essentially resolved - RA   · Atypical Chest pain- doubt cardiac or pulmonary etiology. ?? Diaphragmatic irritation from abdominally placed pacemaker? ? Vs GI/ esophageal etiology. - Improving   · Sepsis - Initially 2/2 infected psoas hematoma/epidural abscess (GAS) with neurological compromise- now with GPC in blood and right-sided infiltrate. ? PICC infection, ? UTI, ? HCAP, pacer infection, fungemia. Also has new LUQ pain (?splenic abscess). 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, resulting in spinal cord infarct- stable  · Enterococcus faecalis in urine (gent susceptible)  · Bacteremia   · Infected pacer pocket  · Psoas hematoma  · LISBETH - D/t neurogenic bladder and component of ATN  · NSTEMI   · Hx of CHF and Afib  · Prior complicated pacer placement (apparently upper extremity and central vein stenoses)  · Inability to obtain MRI (pacer)     Patient Active Problem List   Diagnosis Code    Nonischemic cardiomyopathy ([facility] I42.8    History of complete heart block Z86.79    Biventricular implantable cardioverter-defibrillator in situ Z95.810    Left bundle branch block (LBBB) on electrocardiogram I44.7    Type 2 diabetes mellitus with diabetic neuropathy (HCC) E11.40    Dyslipidemia E78.5    Diabetic neuropathy associated with type 2 diabetes mellitus ([facility] E11.40    Obstructive sleep apnea on CPAP G47.33, Z99.89    AICD generator infection ([facility] T82. 7XXA    Difficult airway for intubation T88. 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 1XXA    Impaired mobility and ADLs Z74.09    History of Coumadin therapy Z79.01    Gout M10.9    Acute paraplegia (HCC) G82.20    Sepsis ([facility] A41.9    Psoas abscess, right ([facility] U02.20    Krueger catheter in place on admission Z96.0    Urinary tract infection due to Enterococcus N39.0, B95.2    Group B streptococcal infection A49.1      PLAN:   · SpO2 goal >92%; titrate supp O2 PRN; currently resting comfortably on 7LPM NC - titrated back down to 2LPM - unsure why O2 was increased - likely not turned back down after breathing treatment this morning. Work to titrate supp O2 down to RA if possible. · Aspiration precautions, HOB >30'  · Pulmonary toileting; encourage ICS  · Duo-nebs to q6 while awake with EZ Pap; No indication for steroids at this time  · Afebrile overnight; new leukocytosis again today ; normotensive; will monitor s/s for infection; ID Following; current ABX: Rocephin 2g q24 hrs (started on 05/22 to complete Tx for infected psoas muscle, per ID); Merrem 1g,q8; Diflucan 200mg q24 hrs. · Encourage pt to eat; PT/OT  · DVT/PUD Prophylaxis per primary  · From Pulmonary standpoint, pt is much improved - Will sign off today; will be available for questions. Thank you.         Tiny Rahman PA-C

## 2017-05-23 NOTE — PROGRESS NOTES
Problem: Dysphagia (Adult)  Goal: *Acute Goals and Plan of Care (Insert Text)  Patient will:  1. Tolerate PO trials with 0 s/s overt distress in 4/5 trials  2. Utilize compensatory swallow strategies/maneuvers (decrease bite/sip, size/rate, alt. liq/sol) with min cues in 4/5 trials  3. Perform oral-motor/laryngeal exercises to increase oropharyngeal swallow function with min cues  4. Complete an objective swallow study (i.e., MBSS) to assess swallow integrity, r/o aspiration, and determine of safest LRD, min A     Rec:   Soft solid with thin liquids; Meds as tolerated   Assistance with all intake   Aspiration precautions  HOB >45 during po intake, remain >30 for 30-45 minutes after po   Small bites/sips; alternate liquid/solid with slow feeding rate   Oral care TID   Outcome: Progressing Towards Goal  SPEECH LANGUAGE PATHOLOGY DYSPHAGIA TREATMENT     Patient: Anitha Garay (65 y.o. female)  Date: 5/23/2017  Diagnosis: Acute paraplegia  Acute paraplegia (HCC) Acute paraplegia (HCC)       Precautions: aspiration Fall, Skin      ASSESSMENT:  Pt was seen at bedside for f/u dysphagia management. Pt tolerated reg solid, puree, and thin liquids +/- straw without any overt s/sx of aspiration. Mildly increased mastication with positive oral clearance appreciated with reg solid trial. Laryngeal elevation appeared functional and timely to palpation. Rec diet upgrade to soft solid with thin liquids, aspiration precautions, oral care TID, and meds as tolerated. ST to f/u x 1-2 more visits to ensure safety of PO. Progression toward goals:  [ ]         Improving appropriately and progressing toward goals  [X]         Improving slowly and progressing toward goals  [ ]         Not making progress toward goals and plan of care will be adjusted       PLAN:  Recommendations and Planned Interventions: See above  Patient continues to benefit from skilled intervention to address the above impairments.  Continue treatment per established plan of care. Discharge Recommendations:  [address] and To Be Determined       SUBJECTIVE:   Patient stated I am feeling better. OBJECTIVE:   Cognitive and Communication Status:  Neurologic State: Alert  Orientation Level: Oriented X4  Cognition: Appropriate for age attention/concentration, Follows commands  Perception: Appears intact  Perseveration: No perseveration noted  Safety/Judgement: Decreased insight into deficits, Decreased awareness of need for assistance  Dysphagia Treatment:  Oral Assessment:  Oral Assessment  Labial: Decreased rate  Dentition: Natural  Oral Hygiene: adequate  Lingual: Decreased strength  Velum: No impairment  Mandible: No impairment  P.O. Trials:              Patient Position: [facility]              Vocal quality prior to P.O.: Low volume              Consistency Presented: Thin liquid, Solid              How Presented: Self-fed/presented, Cup/sip, Spoon, Straw, Successive swallows              Bolus Acceptance: No impairment              Bolus Formation/Control: Impaired              Type of Impairment: Mastication (Slowed)              Propulsion: Delayed (# of seconds)              Oral Residue: Less than 10% of bolus, Lingual              Initiation of Swallow: No impairment              Laryngeal Elevation: Functional              Aspiration Signs/Symptoms: None              Pharyngeal Phase Characteristics: None              Effective Modifications: Small sips and bites, Alternate liquids/solids (Slow rate of intake)              Cues for Modifications: Minimal                             Oral Phase Severity: Mild              Pharyngeal Phase Severity : Mild        PAIN:  Pt reports 0/10 pain or discomfort prior to tx. Pt reports 0/10 pain or discomfort post tx.       After treatment:   [ ]              Patient left in no apparent distress sitting up in chair  [X]              Patient left in no apparent distress in bed  [X] Call bell left within reach  [X]              Nursing notified  [ ]              Caregiver present  [ ]              Bed alarm activated         COMMUNICATION/EDUCATION:   [X]              SLP educated pt with regard to compensatory swallow strategies and                   aspiration/reflux precautions including: small bites/sips,                   alternate liquids/solids, decrease feeding rate, HOB > 45 with all po, and                   upright in bed at 30 degrees after po for at least 45 minutes.       Thank you for this referral.     Leonidas Ureña M.S. CCC-SLP/L  Speech-Language Pathologist

## 2017-05-23 NOTE — PROGRESS NOTES
Problem: Self Care Deficits Care Plan (Adult)  Goal: *Acute Goals and Plan of Care (Insert Text)  Occupational Therapy Goals  Initiated 5/23/2017 within 7 day(s). 1. Patient will perform grooming with supervision/set-up while sitting on the edge of the bed  2. Patient will perform lower body dressing with moderate assistance at bed level  3. Patient will perform scapular setting and alignment program in preparation for her independent sitting midline on the edge of the bed for her self care participation     Sandy Yue     Patient: Sebas Hedrick (61 y.o. female)  Date: 5/23/2017  Diagnosis: Acute paraplegia  Acute paraplegia (HCC) Acute paraplegia ([facility]  Precautions: Fall, Skin      ASSESSMENT :  Based on the objective data described below, the patient presents with decreased functional mobility and decreased balance which is limiting her independence. Following midline orientation facilitation, she improved from mod assist midline sitting to CG. She presents with anxiety that benefits from therapeutic use of self and slow progression to reaching towards her higher level of function. This is to promote her optimal participation. Patient will benefit from skilled intervention to address the above impairments.   Patients rehabilitation potential is considered to be Good  Factors which may influence rehabilitation potential include:   [ ]                None noted  [X]                Mental ability/status  [X]                Medical condition  [ ]                Home/family situation and support systems  [ ]                Safety awareness  [ ]                Pain tolerance/management  [ ]                Other:        PLAN :  Recommendations and Planned Interventions:  [X]                  Self Care Training                  [X]           Therapeutic Activities  [ ]                  Functional Mobility Training    [ ]           Cognitive Retraining  [X]                  Therapeutic Exercises           [ ]           Endurance Activities  [ ]                  Balance Training                   [ ]           Neuromuscular Re-Education  [ ]                  Visual/Perceptual Training     [ ]      Home Safety Training  [X]                  Patient Education                 [X]           Family Training/Education  [X]                  Other (comment): therapeutic use of self     Frequency/Duration: Patient will be followed by occupational therapy 1-2 times per day/ 3-5 days per week to address goals. Discharge Recommendations: Rehab  Further Equipment Recommendations for Discharge: N/A       G-CODES:      Self Care  Current  CL= 60-79%      SUBJECTIVE:   Patient stated \"I'm afraid I am going to fall.       OBJECTIVE DATA SUMMARY:   Hospital course since last seen and reason for reevaluation: per protocol  Cognitive/Behavioral Status:  Neurologic State: Alert  Orientation Level: Oriented X4  Cognition: Appropriate for age attention/concentration, Follows commands  Safety/Judgement: Decreased insight into deficits, Decreased awareness of need for assistance  Skin: per wound care and PT note  Edema: no extremity edema noted  Vision/Perceptual:     tracking is WFL     Coordination:   BUE's WFL    Balance:  Sitting balance improvements as noted above  Strength:  BUE strength is 4/5   Tone & Sensation:  BUE's WFL   Range of Motion:  BUE's AROM is WFL (and no c/o arm pain)   Functional Mobility and Transfers for ADLs:  Bed Mobility:   mod to max assist rolling and max assist X2 supine to sit and mod assist sit to supine  ADL Assessment:   self feeding: independent  Grooming: set up at bed level  UB bathing/dressing: set up at bed level  LB bathing;dressing: total assist at bed level (unable to stand for LB clothes management)  Pain:  Pt reports 0/10 pain or discomfort prior to treatment. Pt reports 0/10 pain or discomfort post treatment.    Activity Tolerance:   No SOB (on 2 liters of O2)  Please refer to the flowsheet for vital signs taken during this treatment. After treatment:   [ ] Patient left in no apparent distress sitting up in chair  [X] Patient left in no apparent distress in bed  [X] Call bell left within reach  [ ] Nursing notified  [X]  is present  [ ] Bed alarm activated      COMMUNICATION/EDUCATION:   [ ]    Home safety education was provided and the patient/caregiver indicated understanding. [ ]    Patient/family have participated as able in goal setting and plan of care. [ ]    Patient/family agree to work toward stated goals and plan of care. [X]    Patient understands intent and goals of therapy, but is neutral about his/her participation. [ ]    Patient is unable to participate in goal setting and plan of care. This patients plan of care is appropriate for delegation to [facility].      Thank you for this referral.  Mala Hoffmann, OTR/L  Time Calculation: 23 mins

## 2017-05-23 NOTE — PROGRESS NOTES
Infectious Disease Progress Note    Requested by: Dr. Tai Ford, dr. Marisol Mccarthy    Reason: sepsis, acute paraplegia    Current abx Prior abx   Meropenem since 5/15  Fluconazole since 5/16/17   Cefepime 4/20-4/21  vancomycin  4/20-4/24  Gentamicin 4/21-4/24  Metronidazole  4/20-4/24  Ceftriaxone  4/24- 5/15  linezolid 5/14-5/17     Lines:   PICC RUE 4/26    Assessment :    77 y.o., right handed female, with an established history of hypertension, complete heart block with permanent pacemaker placed, diabetes mellitus, diabetic peripheral neuropathy, obesity, admitted to [facility] on 4/20/17. Clinical presentation consistent with  Sepsis (POA)  secondary to group B streptococcus bloodstream infection (positive blood cultures 4/20, negative blood cultures 4/21). Most likely source of bloodstream infection is infected right psoas hematoma/abscess. 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 No signs of neurological recovery on today's exam     2 week h/o pain at the site of abdominal pacemaker, tenderness at the site - however, patient doesn't have erythema at the site of pacemaker. No fluid collection noted at pacemaker pocket site per usg 4/24. Quick clearance of bacteremia would argue against endocarditis/endovascular infection. At this time risk of removal of pacemaker/general pocket change exceed the benefit. Discussed with cardiology. Would recommend close monitoring. Abdominal usg 4/24 doesn't reveal evidence of abscess/fluid collection at the site of pacemaker pocket. Enterococcus in urine cultures 4/20 likely colonizer    Acute renal failure: nephrology consult appreciated. Difficult to determine exact etiology of ARF at this time. Slight improvement in creatinine today.      Low grade fever overnight - Ct scan 5/4 reveals increasing focal area within the right psoas muscle inferior to the previously drained area. could represent either spread of infection inferiorly or intramuscular hematoma. S/p ct guided IR drain check - old drain removed. New drain placed inferiorly on 5/10    No evidence of pneumonia    High grade fevers with tm:102 on 5/10, tm:103 on 5/13 - likely due to aspiration pneumonia/hcap. two of two sets of blood cultures 5/14 positive for coagulase negative staphylococcus likely contaminant. Negative repeat blood cultures. unable to perform thoracentesis since inadequate fluid. Improved respiratory status/blood pressure after switch to meropenem    Acute renal failure: multifactorial - nephrology consult appreciated     bilateral LE dvt - s/p IVC filter placement 5/11    No leukocytosis     Resolved fevers. No evidence of abdominal wall cellulitis noted on ct scan. Clinically better    Recommendations:    1. cont fluconazole (d7/14) to cover for possible candida cystitis. Continue ceftriaxone till 6/14/17  to complete treatment for infected psoas abscess  2. D/c picc line once antibiotic therapy completed  3. D/c planning per primary team      Above plan was discussed in details with patient. Please call me if any further questions or concerns. Will continue to participate in the care of this patient. subjective:    Feels better. Denies cp, sob. Unable to move legs. No nausea, vomiting. No new rash/itching/joint pain/back pain. Home Medication List    Details   gabapentin (NEURONTIN) 400 mg capsule Take 1 Cap by mouth two (2) times a day. Indications: NEUROPATHIC PAIN  Qty: 30 Cap, Refills: 0    Associated Diagnoses: Iliopsoas muscle hematoma, right, subsequent encounter      cholecalciferol (VITAMIN D3) 1,000 unit tablet Take 2 Tabs by mouth daily. Indications: PREVENTION OF VITAMIN D DEFICIENCY  Qty: 30 Tab, Refills: 0      SITagliptin (JANUVIA) 50 mg tablet Take 50 mg by mouth daily.  Indications: type 2 diabetes mellitus      potassium chloride (KLOR-CON M20) 20 mEq tablet Take 20 mEq by mouth two (2) times a day. Indications: HYPOKALEMIA PREVENTION      ondansetron (ZOFRAN ODT) 4 mg disintegrating tablet Take 4 mg by mouth every eight (8) hours as needed for Nausea. cyclobenzaprine (FLEXERIL) 10 mg tablet Take 10 mg by mouth as needed for Muscle Spasm(s). Indications: MUSCLE SPASM      carvedilol (COREG) 6.25 mg tablet Take 1 Tab by mouth two (2) times daily (with meals). Indications: hypertension  Qty: 30 Tab, Refills: 0    Associated Diagnoses: Benign hypertensive heart disease with systolic CHF, NYHA class 2 (HCC)      acetaminophen (TYLENOL) 325 mg tablet Take 2 Tabs by mouth every four (4) hours as needed (for fever or pain level less than 5/10). Indications: Fever, Pain  Qty: 30 Tab, Refills: 0    Associated Diagnoses: Iliopsoas muscle hematoma, right, subsequent encounter      allopurinol (ZYLOPRIM) 100 mg tablet Take 1 Tab by mouth daily. Indications: GOUT  Qty: 15 Tab, Refills: 0    Associated Diagnoses: Chronic gout, unspecified cause, unspecified site      insulin glargine (LANTUS) 100 unit/mL injection 15 Units by SubCUTAneous route nightly. Indications: type 2 diabetes mellitus  Qty: 1 Vial, Refills: 0    Associated Diagnoses: Type 2 diabetes mellitus with diabetic neuropathy, with long-term current use of insulin ([facility])      docusate sodium (COLACE) 100 mg capsule Take 1 Cap by mouth daily (after breakfast). Indications: Constipation  Qty: 15 Cap, Refills: 0      senna-docusate (PERICOLACE) 8.6-50 mg per tablet Take 2 Tabs by mouth daily (after dinner). Indications: Constipation  Qty: 30 Tab, Refills: 0      oxyCODONE-acetaminophen (PERCOCET 7.5) 7.5-325 mg per tablet Take 1 Tab by mouth every four (4) hours as needed (for pain level greater than 4/10). Max Daily Amount: 6 Tabs.  Indications: Pain  Qty: 50 Tab, Refills: 0    Associated Diagnoses: Iliopsoas muscle hematoma, right, subsequent encounter      bumetanide (BUMEX) 1 mg tablet TAKE 1 TABLET TWICE A DAY  Qty: 180 Tab, Refills: 1      pravastatin (PRAVACHOL) 40 mg tablet Take 40 mg by mouth nightly. Indications: DYSLIPIDEMIA      ferrous sulfate 325 mg (65 mg iron) tablet Take 1 Tab by mouth two (2) times daily (with meals).   Qty: 30 Tab, Refills: 0      insulin aspart (NOVOLOG) 100 unit/mL injection INITIATE INSULIN CORRECTIVE PROTOCOL (HR): Normal Insulin Sensitivity  For Blood Sugar (mg/dL) of:    Less than 150 =   0 units          150 -199 =   2 units 200 -249 =   4 units 250 -299 =   6 units 300 -349 =   8 units 350 and above =   10 units If 2 glucose readings are above 200 mg/dL  Qty: 10 mL, Refills: 6             Current Facility-Administered Medications   Medication Dose Route Frequency    fluconazole (DIFLUCAN) tablet 200 mg  200 mg Oral DAILY    cefTRIAXone (ROCEPHIN) 2 g in 0.9% sodium chloride (MBP/ADV) 50 mL MBP  2 g IntraVENous Q24H    albuterol-ipratropium (DUO-NEB) 2.5 MG-0.5 MG/3 ML  3 mL Nebulization Q6HWA RT    famotidine (PEPCID) tablet 20 mg  20 mg Oral BID    insulin lispro (HUMALOG) injection   SubCUTAneous AC&HS    insulin glargine (LANTUS) injection 7 Units  7 Units SubCUTAneous DAILY    0.9% sodium chloride infusion 250 mL  250 mL IntraVENous PRN    midodrine (PROAMITINE) tablet 10 mg  10 mg Oral Q8H    acetaminophen (TYLENOL) suppository 650 mg  650 mg Rectal Q6H PRN    acetaminophen (TYLENOL) tablet 500 mg  500 mg Oral Q6H PRN    diclofenac (VOLTAREN) 1 % topical gel 2 g  2 g Topical Q8H PRN    allopurinol (ZYLOPRIM) tablet 50 mg  50 mg Oral DAILY    aluminum-magnesium hydroxide (MAALOX) oral suspension 15 mL  15 mL Oral QID PRN    Lactobacillus Acidoph & Bulgar (FLORANEX) tablet 1 Tab  1 Tab Oral BID    sodium chloride (NS) flush 5-10 mL  5-10 mL IntraVENous Q8H    naloxone (NARCAN) injection 0.1 mg  0.1 mg IntraVENous Multiple    acetaminophen (TYLENOL) tablet 650 mg  650 mg Oral Q4H PRN    0.9% sodium chloride infusion 250 mL  250 mL IntraVENous PRN    diphenhydrAMINE (BENADRYL) capsule 50 mg  50 mg Oral Q4H PRN    polyethylene glycol (MIRALAX) packet 17 g  17 g Oral TID    cholecalciferol (VITAMIN D3) tablet 2,000 Units  2,000 Units Oral DAILY    docusate sodium (COLACE) capsule 100 mg  100 mg Oral DAILY AFTER BREAKFAST    oxyCODONE-acetaminophen (PERCOCET 7.5) 7.5-325 mg per tablet 1 Tab  1 Tab Oral Q4H PRN    pravastatin (PRAVACHOL) tablet 40 mg  40 mg Oral QHS    senna-docusate (PERICOLACE) 8.6-50 mg per tablet 2 Tab  2 Tab Oral PCD    folic acid (FOLVITE) tablet 1 mg  1 mg Oral DAILY    ondansetron (ZOFRAN) injection 4 mg  4 mg IntraVENous Q8H PRN    glucose chewable tablet 16 g  16 g Oral PRN    glucagon (GLUCAGEN) injection 1 mg  1 mg IntraMUSCular PRN    dextrose (D50W) injection syrg 12.5-25 g  25-50 mL IntraVENous PRN       Allergies: Vancomycin; Ampicillin; Bactrim [sulfamethoxazole-trimethoprim]; Blueberry; Ciprofloxacin; Codeine; Crestor [rosuvastatin]; Darvocet a500 [propoxyphene n-acetaminophen]; Demerol [meperidine]; Levaquin [levofloxacin]; Lipitor [atorvastatin]; Magnesium oxide; Minocin [minocycline]; Pcn [penicillins]; Pravachol [pravastatin]; Sulfa (sulfonamide antibiotics); Ultracet [tramadol-acetaminophen]; Vicodin [hydrocodone-acetaminophen]; Vytorin 10-10 [ezetimibe-simvastatin]; and Percodan [oxycodone hcl-oxycodone-asa]    Temp (24hrs), Av.6 °F (37 °C), Min:97.5 °F (36.4 °C), Max:99.2 °F (37.3 °C)    Visit Vitals    /71 (BP 1 Location: Left arm, BP Patient Position: At rest)    Pulse 75    Temp 98.5 °F (36.9 °C)    Resp 18    Ht 5' 7\" (1.702 m)    Wt 111.8 kg (246 lb 7.6 oz)    SpO2 97%    Breastfeeding No    BMI 38.6 kg/m2       ROS: patient unable to communicate fluently. Detailed ros not feasible.     Physical Exam:    General: Well developed, well nourished female laying on the bed, AAOx3 NAD    General:   awake alert and oriented   HEENT:  Normocephalic, atraumatic, PERRL, EOMI, no scleral icterus or pallor; no conjunctival hemmohage;  nasal and oral mucous are moist and without evidence of lesions. Neck supple, no bruits. Lymph Nodes:   no cervical, axillary or inguinal adenopathy   Lungs:   non-labored, bilaterally clear to auscultation- no crackles wheezes rales or rhonchi   Heart:   s1 and s2 irregular; no rubs or gallops, no edema, + pedal pulses   Abdomen:  soft, non-distended, active bowel sounds, no hepatomegaly, no splenomegaly. no tenderness over the left abdominal pacemaker, no overlying erythema or fluctuance, drain right lower abdomen with serous drainage, mild linnette umbilical tenderness   Genitourinary:  deferred   Extremities:   no clubbing, cyanosis; no joint effusions or swelling; muscle mass appropriate for age   Neurologic:  No gross focal sensory abnormalities; 5/5 muscle strength to upper extremities. 0/5 strength in lower extremities. Cranial nerves intact                        Skin:  Surgical scars abdomen, left neck well healed   Back:   no paraspinal muscle guarding or rigidity, no right CVA tenderness     Psychiatric:  No suicidal or homicidal ideations, appropriate mood and affect         Labs: Results:   Chemistry Recent Labs      05/23/17   0528 05/22/17 0449 05/21/17   0511   GLU  77  79  111*   NA  138  139  137   K  3.5  3.6  3.8   CL  100  102  103   CO2  30  29  29   BUN  13  14  17   CREA  1.09  1.08  1.15   CA  8.5  8.5  8.6   AGAP  8  8  5   BUCR  12  13  15   AP  157*  168*  178*   TP  6.0*  6.0*  6.0*   ALB  2.0*  2.1*  2.1*   GLOB  4.0  3.9  3.9   AGRAT  0.5*  0.5*  0.5*      CBC w/Diff Recent Labs      05/23/17 0528 05/22/17 0449 05/21/17   0511   WBC  13.5*  11.3  13.7*   RBC  3.28*  3.24*  3.24*   HGB  8.5*  8.4*  8.5*   HCT  26.2*  26.0*  26.1*   PLT  193  188  207   GRANS  70  80*  79*   LYMPH  19*  13*  13*   EOS  0  4  2      Microbiology No results for input(s): CULT in the last 72 hours.        RADIOLOGY:    All available imaging studies/reports in [facility] for this admission were reviewed    Dr. Peewee Reid, Infectious Disease Specialist  [phone]  May 23, 2017  3:49 PM

## 2017-05-24 LAB
BASOPHILS # BLD AUTO: 0.1 K/UL (ref 0–0.1)
BASOPHILS # BLD: 1 % (ref 0–2)
DIFFERENTIAL METHOD BLD: ABNORMAL
EOSINOPHIL # BLD: 0.2 K/UL (ref 0–0.4)
EOSINOPHIL NFR BLD: 1 % (ref 0–5)
ERYTHROCYTE [DISTWIDTH] IN BLOOD BY AUTOMATED COUNT: 17.4 % (ref 11.6–14.5)
GLUCOSE BLD STRIP.AUTO-MCNC: 105 MG/DL (ref 70–110)
GLUCOSE BLD STRIP.AUTO-MCNC: 89 MG/DL (ref 70–110)
GLUCOSE BLD STRIP.AUTO-MCNC: 94 MG/DL (ref 70–110)
GLUCOSE BLD STRIP.AUTO-MCNC: 99 MG/DL (ref 70–110)
HCT VFR BLD AUTO: 26.4 % (ref 35–45)
HGB BLD-MCNC: 8.5 G/DL (ref 12–16)
LYMPHOCYTES # BLD AUTO: 16 % (ref 21–52)
LYMPHOCYTES # BLD: 2.1 K/UL (ref 0.9–3.6)
MAGNESIUM SERPL-MCNC: 1.6 MG/DL (ref 1.6–2.6)
MCH RBC QN AUTO: 25.8 PG (ref 24–34)
MCHC RBC AUTO-ENTMCNC: 32.2 G/DL (ref 31–37)
MCV RBC AUTO: 80 FL (ref 74–97)
MONOCYTES # BLD: 1 K/UL (ref 0.05–1.2)
MONOCYTES NFR BLD AUTO: 7 % (ref 3–10)
NEUTS SEG # BLD: 10 K/UL (ref 1.8–8)
NEUTS SEG NFR BLD AUTO: 75 % (ref 40–73)
PLATELET # BLD AUTO: 219 K/UL (ref 135–420)
PMV BLD AUTO: 8.5 FL (ref 9.2–11.8)
RBC # BLD AUTO: 3.3 M/UL (ref 4.2–5.3)
WBC # BLD AUTO: 13.3 K/UL (ref 4.6–13.2)

## 2017-05-24 PROCEDURE — 65270000029 HC RM PRIVATE

## 2017-05-24 PROCEDURE — 65270000032 HC RM SEMIPRIVATE

## 2017-05-24 PROCEDURE — 74011250637 HC RX REV CODE- 250/637: Performed by: INTERNAL MEDICINE

## 2017-05-24 PROCEDURE — 83735 ASSAY OF MAGNESIUM: CPT | Performed by: INTERNAL MEDICINE

## 2017-05-24 PROCEDURE — 85025 COMPLETE CBC W/AUTO DIFF WBC: CPT | Performed by: INTERNAL MEDICINE

## 2017-05-24 PROCEDURE — 36415 COLL VENOUS BLD VENIPUNCTURE: CPT | Performed by: INTERNAL MEDICINE

## 2017-05-24 PROCEDURE — 94640 AIRWAY INHALATION TREATMENT: CPT

## 2017-05-24 PROCEDURE — 77010033678 HC OXYGEN DAILY

## 2017-05-24 PROCEDURE — 82962 GLUCOSE BLOOD TEST: CPT

## 2017-05-24 PROCEDURE — 74011250636 HC RX REV CODE- 250/636: Performed by: INTERNAL MEDICINE

## 2017-05-24 PROCEDURE — 74011000258 HC RX REV CODE- 258: Performed by: INTERNAL MEDICINE

## 2017-05-24 PROCEDURE — 92526 ORAL FUNCTION THERAPY: CPT

## 2017-05-24 PROCEDURE — 74011636637 HC RX REV CODE- 636/637: Performed by: FAMILY MEDICINE

## 2017-05-24 PROCEDURE — 74011250637 HC RX REV CODE- 250/637: Performed by: HOSPITALIST

## 2017-05-24 PROCEDURE — 77030020847 HC STATLOK BARD -A

## 2017-05-24 PROCEDURE — 74011000250 HC RX REV CODE- 250: Performed by: PHYSICIAN ASSISTANT

## 2017-05-24 PROCEDURE — 74011250636 HC RX REV CODE- 250/636: Performed by: HOSPITALIST

## 2017-05-24 RX ORDER — POTASSIUM CHLORIDE 20 MEQ/1
40 TABLET, EXTENDED RELEASE ORAL
Status: COMPLETED | OUTPATIENT
Start: 2017-05-24 | End: 2017-05-24

## 2017-05-24 RX ORDER — INSULIN GLARGINE 100 [IU]/ML
5 INJECTION, SOLUTION SUBCUTANEOUS DAILY
Status: DISCONTINUED | OUTPATIENT
Start: 2017-05-25 | End: 2017-05-25 | Stop reason: HOSPADM

## 2017-05-24 RX ORDER — MAGNESIUM SULFATE HEPTAHYDRATE 40 MG/ML
2 INJECTION, SOLUTION INTRAVENOUS ONCE
Status: COMPLETED | OUTPATIENT
Start: 2017-05-24 | End: 2017-05-24

## 2017-05-24 RX ADMIN — LACTOBACILLUS TAB 1 TABLET: TAB at 17:56

## 2017-05-24 RX ADMIN — Medication 2 TABLET: at 17:56

## 2017-05-24 RX ADMIN — ACETAMINOPHEN 650 MG: 500 TABLET ORAL at 09:30

## 2017-05-24 RX ADMIN — LACTOBACILLUS TAB 1 TABLET: TAB at 09:35

## 2017-05-24 RX ADMIN — Medication 10 ML: at 22:05

## 2017-05-24 RX ADMIN — FAMOTIDINE 20 MG: 20 TABLET ORAL at 09:34

## 2017-05-24 RX ADMIN — MAGNESIUM SULFATE HEPTAHYDRATE 2 G: 40 INJECTION, SOLUTION INTRAVENOUS at 11:00

## 2017-05-24 RX ADMIN — ACETAMINOPHEN 650 MG: 500 TABLET ORAL at 22:05

## 2017-05-24 RX ADMIN — Medication 10 ML: at 05:33

## 2017-05-24 RX ADMIN — POTASSIUM CHLORIDE 40 MEQ: 20 TABLET, EXTENDED RELEASE ORAL at 09:43

## 2017-05-24 RX ADMIN — CHOLECALCIFEROL TAB 25 MCG (1000 UNIT) 2000 UNITS: 25 TAB at 09:36

## 2017-05-24 RX ADMIN — MIDODRINE HYDROCHLORIDE 10 MG: 2.5 TABLET ORAL at 09:35

## 2017-05-24 RX ADMIN — Medication 10 ML: at 17:57

## 2017-05-24 RX ADMIN — MIDODRINE HYDROCHLORIDE 10 MG: 2.5 TABLET ORAL at 17:55

## 2017-05-24 RX ADMIN — IPRATROPIUM BROMIDE AND ALBUTEROL SULFATE 3 ML: .5; 3 SOLUTION RESPIRATORY (INHALATION) at 21:21

## 2017-05-24 RX ADMIN — ALLOPURINOL 50 MG: 100 TABLET ORAL at 09:29

## 2017-05-24 RX ADMIN — DOCUSATE SODIUM 100 MG: 100 CAPSULE, LIQUID FILLED ORAL at 09:35

## 2017-05-24 RX ADMIN — INSULIN GLARGINE 7 UNITS: 100 INJECTION, SOLUTION SUBCUTANEOUS at 09:00

## 2017-05-24 RX ADMIN — IPRATROPIUM BROMIDE AND ALBUTEROL SULFATE 3 ML: .5; 3 SOLUTION RESPIRATORY (INHALATION) at 08:12

## 2017-05-24 RX ADMIN — IPRATROPIUM BROMIDE AND ALBUTEROL SULFATE 3 ML: .5; 3 SOLUTION RESPIRATORY (INHALATION) at 13:48

## 2017-05-24 RX ADMIN — FAMOTIDINE 20 MG: 20 TABLET ORAL at 17:56

## 2017-05-24 RX ADMIN — FOLIC ACID 1 MG: 1 TABLET ORAL at 09:35

## 2017-05-24 RX ADMIN — ONDANSETRON 4 MG: 2 INJECTION INTRAMUSCULAR; INTRAVENOUS at 17:55

## 2017-05-24 RX ADMIN — MIDODRINE HYDROCHLORIDE 10 MG: 2.5 TABLET ORAL at 00:24

## 2017-05-24 RX ADMIN — PRAVASTATIN SODIUM 40 MG: 20 TABLET ORAL at 22:02

## 2017-05-24 RX ADMIN — CEFTRIAXONE 2 G: 2 INJECTION, POWDER, FOR SOLUTION INTRAMUSCULAR; INTRAVENOUS at 12:00

## 2017-05-24 RX ADMIN — FLUCONAZOLE 200 MG: 200 TABLET ORAL at 09:35

## 2017-05-24 NOTE — CONSULTS
Consult    Patient: Nu Bueno MRN: [id number]  SSN: [id number]    YOB: 1950  Age: 77 y.o. Sex: female      Subjective:      Nu Bueno is a 77 y.o. female who is being seen for asked to evaluate and treat ulcer on both feet. Neuropathy. Reviewed medical history. .    Past Medical History:   Diagnosis Date    Acute paraplegia ([facility] 4/20/2017    Benign hypertensive heart disease with systolic CHF, NYHA class 2 ([facility] 9/5/2012    Biventricular implantable cardioverter-defibrillator in situ 04/28/2005    Upgraded to BiV AICD; gen change 4/2008; pocket revision 10/2009; Abdominal - done on 8/22/2012 by Dr. Liu Other Cardiac cath 08/15/1996    Patent coronaries. Elev LVEDP. EF 50-55%.  Cardiac echocardiogram 06/23/2015    Ltd study. EF 45-50%. Mild, diffuse hypk. Severe apical hypk. No mass or thrombus was clearly identified, although imaging was suboptimal.      Cardiac nuclear imaging test 06/19/2015    Fixed distal apical, distal septal defect more likely due to RV pacing than prior infarct. No ischemia. EF 46%. RWMA c/w RV pacing. Nondiagnostic EKG on pharm stress test.      Cardiovascular lower extremity venous duplex 09/04/2012    Acute, non-occlusive DVT in CFV on right. No DVT on left. No superficial thrombosis bilaterally.  Cardiovascular upper extremity venous duplex 08/27/2012    DVT in axillary vein on left. Left subclavian was not visualized.     Chronic anemia 9/5/2012    Chronic systolic heart failure (HCC)     Decreased calculated glomerular filtration rate (GFR) 3/30/2017    Calculated GFR equivalent to that of CKD stage 3 = 30-59 ml/min    Diabetic neuropathy associated with type 2 diabetes mellitus ([facility] 6/28/2011    Difficult airway for intubation 08/22/2012    see anesthesia airway note    Dyslipidemia 6/28/2011    Gout     History of complete heart block 6/28/2011    History of Coumadin therapy     Anticoagulation for DVT of the LUE; Discontinued on 3/30/2017    History of deep venous thrombosis 9/5/2012    Left upper extremity    History of pyelonephritis 3/30/2017    Left bundle branch block (LBBB) on electrocardiogram 6/28/2011    Nonischemic cardiomyopathy ([facility] 6/28/2011    Obesity (BMI 35.0-39.9 without comorbidity) ([facility] 3/13/2017    Obstructive sleep apnea on CPAP 2/7/2012    Psoas abscess, right ([facility] 4/20/2017    Psoas hematoma, right, secondary to anticoagulant therapy 3/30/2017    Type 2 diabetes mellitus with diabetic neuropathy ([facility] 6/28/2011     Past Surgical History:   Procedure Laterality Date    HX CARPAL TUNNEL RELEASE  4/07    right     HX CHOLECYSTECTOMY  1994    HX HYSTERECTOMY  1973    HX OTHER SURGICAL  6/11/2012    AICD revision    HX PACEMAKER  4/28/2005    [employer] AICD      Family History   Problem Relation Age of Onset    Cancer Father      Leukemia     Social History   Substance Use Topics    Smoking status: Never Smoker    Smokeless tobacco: Never Used    Alcohol use No      Current Facility-Administered Medications   Medication Dose Route Frequency Provider Last Rate Last Dose    magnesium sulfate 2 g/50 ml IVPB (premix or compounded)  2 g IntraVENous ONCE [facility] Christiana Garcia MD        fluconazole (DIFLUCAN) tablet 200 mg  200 mg Oral DAILY Katheryn Sterling MD   200 mg at 05/24/17 0935    cefTRIAXone (ROCEPHIN) 2 g in 0.9% sodium chloride (MBP/ADV) 50 mL MBP  2 g IntraVENous Q24H Katheryn Sterling  mL/hr at 05/23/17 1200 2 g at 05/23/17 1200    albuterol-ipratropium (DUO-NEB) 2.5 MG-0.5 MG/3 ML  3 mL Nebulization Q6HWA RT Emery Bryant PA-C   3 mL at 05/24/17 0812    famotidine (PEPCID) tablet 20 mg  20 mg Oral BID Hansel Andrew MD   20 mg at 05/24/17 0934    insulin lispro (HUMALOG) injection   SubCUTAneous AC&HS Andria Galvez MD   Stopped at 05/21/17 1640    insulin glargine (LANTUS) injection 7 Units  7 Units SubCUTAneous DAILY Iain Rai MD   7 Units at 05/24/17 0900    0.9% sodium chloride infusion 250 mL  250 mL IntraVENous PRN Ksenia Juarez NP        midodrine (PROAMITINE) tablet 10 mg  10 mg Oral Q8H Bartolome Hansen MD   10 mg at 05/24/17 0935    acetaminophen (TYLENOL) suppository 650 mg  650 mg Rectal Q6H PRN Humberto Pelaez III, MD   650 mg at 05/14/17 0153    diclofenac (VOLTAREN) 1 % topical gel 2 g  2 g Topical Q8H PRN Elizabeth Ndiaye MD        allopurinol (ZYLOPRIM) tablet 50 mg  50 mg Oral DAILY Marino Antoine MD   50 mg at 05/24/17 0929    aluminum-magnesium hydroxide (MAALOX) oral suspension 15 mL  15 mL Oral QID PRN Elizabeth Ndiaye MD   15 mL at 05/16/17 0536    Lactobacillus Acidoph & Bulgar [facility]) tablet 1 Tab  1 Tab Oral BID Bailee Shipley MD   1 Tab at 05/24/17 0935    sodium chloride (NS) flush 5-10 mL  5-10 mL IntraVENous Q8H Chao Freire MD   10 mL at 05/24/17 0533    naloxone (NARCAN) injection 0.1 mg  0.1 mg IntraVENous Multiple Pily Mcgraw MD        acetaminophen (TYLENOL) tablet 650 mg  650 mg Oral Q4H PRN Darwin Stroud MD   650 mg at 05/24/17 0930    0.9% sodium chloride infusion 250 mL  250 mL IntraVENous PRN Tete Burgess MD        diphenhydrAMINE (BENADRYL) capsule 50 mg  50 mg Oral Q4H PRN Darwin Stroud MD   50 mg at 05/15/17 0000    polyethylene glycol (MIRALAX) packet 17 g  17 g Oral TID Gris Sanz MD   Stopped at 05/22/17 2304    cholecalciferol (VITAMIN D3) tablet 2,000 Units  2,000 Units Oral DAILY Mayo Santoyo MD   2,000 Units at 05/24/17 3913    docusate sodium (COLACE) capsule 100 mg  100 mg Oral DAILY AFTER BREAKFAST Mayo Santoyo MD   100 mg at 05/24/17 0935    oxyCODONE-acetaminophen (PERCOCET 7.5) 7.5-325 mg per tablet 1 Tab  1 Tab Oral Q4H PRN Mayo Santoyo MD   1 Tab at 05/16/17 0526    pravastatin (PRAVACHOL) tablet 40 mg  40 mg Oral QHS Mayo Santoyo MD   40 mg at 05/23/17 2258    senna-docusate (PERICOLACE) 8.6-50 mg per tablet 2 Tab  2 Tab Oral PCD Mayo Santoyo MD   2 Tab at 11/35/36 0629    folic acid (FOLVITE) tablet 1 mg  1 mg Oral DAILY Odetta Brittle, MD   1 mg at 05/24/17 0935    ondansetron (ZOFRAN) injection 4 mg  4 mg IntraVENous Q8H PRN Odetta Brittle, MD   4 mg at 05/23/17 2059    glucose chewable tablet 16 g  16 g Oral PRN Odetta Brittle, MD        glucagon (GLUCAGEN) injection 1 mg  1 mg IntraMUSCular PRN Odetta Brittle, MD        dextrose (D50W) injection syrg 12.5-25 g  25-50 mL IntraVENous PRN Odetta Brittle, MD            Allergies   Allergen Reactions    Vancomycin Itching    Ampicillin Itching    Bactrim [Sulfamethoxazole-Trimethoprim] Unknown (comments)    Blueberry Swelling     Causes throat swelling    Ciprofloxacin Itching    Codeine Other (comments)     Jumpy feeling    Crestor [Rosuvastatin] Itching    Darvocet A500 [Propoxyphene N-Acetaminophen] Itching    Demerol [Meperidine] Itching    Levaquin [Levofloxacin] Itching    Lipitor [Atorvastatin] Myalgia    Magnesium Oxide Itching     nausea    Minocin [Minocycline] Unknown (comments)    Pcn [Penicillins] Itching    Pravachol [Pravastatin] Swelling     Swelling in mouth     Sulfa (Sulfonamide Antibiotics) Itching    Ultracet [Tramadol-Acetaminophen] Itching    Vicodin [Hydrocodone-Acetaminophen] Unknown (comments)    Vytorin 10-10 [Ezetimibe-Simvastatin] Myalgia    Percodan [Oxycodone Hcl-Oxycodone-Asa] Itching       Review of Systems:  A comprehensive review of systems was negative except for that written in the History of Present Illness. Objective:     Vitals:    05/24/17 0000 05/24/17 0500 05/24/17 0803 05/24/17 0814   BP: 142/74 141/72 138/74    Pulse: 82 67 75    Resp: 18 18 18    Temp: 97.4 °F (36.3 °C) 98.5 °F (36.9 °C) 97 °F (36.1 °C)    TempSrc:       SpO2: 99% 99% 99% 99%   Weight:  110.9 kg (244 lb 6.4 oz)     Height:            Physical Exam:  Seen at bedside awake and alert. Unable to move both lower legs and loss of sensation.   Discolored callus , superficial ulcers under first metatarsal both feet. No drainage or redness. Toenails mycotic, thick and ingrown both feet. Assessment:     Hospital Problems  Date Reviewed: 5/24/2017          Codes Class Noted POA    * (Principal)Acute paraplegia ([facility] ICD-10-CM: G82.20  ICD-9-CM: 344.1  4/20/2017 Yes        Sepsis ([facility] ICD-10-CM: A41.9  ICD-9-CM: 038.9, 995.91  4/20/2017 Yes        Psoas abscess, right ([facility] ICD-10-CM: N18.61  ICD-9-CM: 567.31  4/20/2017 Yes        Krueger catheter in place on admission ICD-10-CM: Z96.0  ICD-9-CM: V45.89  4/20/2017 Yes        Urinary tract infection due to Enterococcus ICD-10-CM: N39.0, B95.2  ICD-9-CM: 599.0, 041.04  4/20/2017 Yes        Group B streptococcal infection ICD-10-CM: A49.1  ICD-9-CM: 041.02  4/20/2017 Yes        History of Coumadin therapy ICD-10-CM: Z79.01  ICD-9-CM: V58.61  Unknown Yes    Overview Signed 4/6/2017 10:35 PM by Kermit Dinh MD     Anticoagulation for chronic atrial fibrillation; Discontinued on 3/30/2017             Decreased calculated glomerular filtration rate (GFR) ICD-10-CM: R94.4  ICD-9-CM: 794.4  3/30/2017 Yes    Overview Signed 4/6/2017  5:47 PM by Kermit Dinh MD     Calculated GFR equivalent to that of CKD stage 3 = 30-59 ml/min             Iliopsoas muscle hematoma ICD-10-CM: S70.10XA  ICD-9-CM: 924.00  3/30/2017 Yes        Psoas hematoma, right, secondary to anticoagulant therapy ICD-10-CM: S30. 1XXA  ICD-9-CM: 924.9, E934.2  3/30/2017 Yes        Impaired mobility and ADLs ICD-10-CM: Z74.09  ICD-9-CM: 799.89  3/30/2017 Yes        Benign hypertensive heart disease with systolic CHF, NYHA class 2 (HCC) (Chronic) ICD-10-CM: I11.0, I50.20  ICD-9-CM: 402.11, 428.20, 428.0  9/5/2012 Yes        AICD generator infection ([facility] ICD-10-CM: T82. 7XXA  ICD-9-CM: 996.61  8/20/2012 Yes    Overview Signed 8/20/2012  6:13 PM by Annita Kearney MD     S/p explant 4 leads 8/20/12             Type 2 diabetes mellitus with diabetic neuropathy (HCC) (Chronic) ICD-10-CM: E11.40  ICD-9-CM: 250.60, 357.2  6/28/2011 Yes              Plan:     Diabetic neuropathy, pressure ulcer both feet and no signs infective process. Plan to debride at bedside.      Signed By: Guille Del Castillo DPM     May 24, 2017

## 2017-05-24 NOTE — PROGRESS NOTES
Observed area below big toe of right foot, that appears dry and scaley. It appeared to be the size of a 50cent coin. Pt. Had no c/o pain.

## 2017-05-24 NOTE — PROGRESS NOTES
Problem: Dysphagia (Adult)  Goal: *Acute Goals and Plan of Care (Insert Text)    Rec:   Soft solid with thin liquids; Meds as tolerated   Assistance with all intake   Aspiration precautions  HOB >45 during po intake, remain >30 for 30-45 minutes after po   Small bites/sips; alternate liquid/solid with slow feeding rate   Oral care TID   Outcome: Resolved/Met Date Met:  05/24/17  SPEECH LANGUAGE PATHOLOGY DYSPHAGIA TREATMENT/DISCHARGE     Patient: Solo Arora (89 y.o. female)  Date: 5/24/2017  Diagnosis: Acute paraplegia  Acute paraplegia (HCC) Acute paraplegia (HCC)       Precautions: aspiration Fall, Skin      ASSESSMENT:  Pt was seen at bedside for f/u dysphagia management. Pt tolerating reg solid, puree, and thin liquids without any overt s/sx of aspiration. Mildly increased mastication with positive oral clearance on reg solid trial. Laryngeal elevation appeared functional and timely to palpation. Discussed safe swallowing techniques/strategies, diet recs, s/sx of aspiration, and role of SLP with pt and daughter with verbalized understanding. Continue to rec soft solid diet with thin liquids, aspiration precautions, oral care TID, and meds as tolerated. No further skilled SLP services are indicated at this time. Please re-consult if needed. PLAN:  Recommendations and Planned Interventions: See above  Discharge Recommendations:  Home Health, Outpatient and To Be Determined       SUBJECTIVE:   Patient stated Lisandra escamilla for all of your help.       OBJECTIVE:   Cognitive and Communication Status:  Neurologic State: Alert  Orientation Level: Oriented X4  Cognition: Appropriate decision making, Appropriate for age attention/concentration, Follows commands  Perception: Appears intact  Perseveration: No perseveration noted  Safety/Judgement: Decreased insight into deficits, Decreased awareness of need for assistance  Dysphagia Treatment:  Oral Assessment:  Oral Assessment  Labial: Decreased rate  Dentition: Natural  Oral Hygiene: adequate  Lingual: Decreased strength  Velum: No impairment  Mandible: No impairment  P.O. Trials:              Patient Position: [facility] at [facility]              Vocal quality prior to P.O.: Low volume              Consistency Presented: Thin liquid, Solid              How Presented: Self-fed/presented, Cup/sip, Spoon, Straw, Successive swallows              Bolus Acceptance: No impairment              Bolus Formation/Control: Impaired              Type of Impairment: Mastication (Slowed)              Propulsion: Delayed (# of seconds)              Oral Residue: Less than 10% of bolus, Lingual              Initiation of Swallow: No impairment              Laryngeal Elevation: Functional              Aspiration Signs/Symptoms: None              Pharyngeal Phase Characteristics: None              Effective Modifications: Small sips and bites, Alternate liquids/solids              Cues for Modifications: Minimal                  Oral Phase Severity: Mild              Pharyngeal Phase Severity : Mild           GCODESwallowing:  Swallow D/C Status CI= 1-19%     PAIN:  Pt reports 0/10 pain or discomfort prior to tx. Pt reports 0/10 pain or discomfort post tx. After treatment:   [ ]              Patient left in no apparent distress sitting up in chair  [X]              Patient left in no apparent distress in bed  [X]              Call bell left within reach  [X]              Nursing notified  [ ]              Caregiver present  [ ]              Bed alarm activated         COMMUNICATION/EDUCATION:   [X]              SLP educated pt with regard to compensatory swallow strategies and                   aspiration/reflux precautions including: small bites/sips,                   alternate liquids/solids, decrease feeding rate, HOB > 45 with all po, and                   upright in bed at 30 degrees after po for at least 45 minutes.       Thank you for this referral.     Gómez Mcclain M.S. CCC-SLP/L  Speech-Language Pathologist

## 2017-05-24 NOTE — PROGRESS NOTES
Progress Note    Patient: Nu Bueno MRN: [id number]  SSN: [id number]    YOB: 1950  Age: 77 y.o.   Sex: female      Admit Date: 4/20/2017    LOS: 34 days     Subjective:     NO C/O FLANK ,  ABD  OR BACK PAIN  LOULOU OUT  TEMP DOWN    Objective:     Vitals:    05/23/17 1957 05/23/17 2000 05/24/17 0000 05/24/17 0500   BP:  124/63 142/74 141/72   Pulse:  79 82 67   Resp:  18 18 18   Temp:  98.8 °F (37.1 °C) 97.4 °F (36.3 °C) 98.5 °F (36.9 °C)   TempSrc:       SpO2: 98% 99% 99% 99%   Weight:    244 lb 6.4 oz (110.9 kg)   Height:            Intake and Output:  Current Shift:    Last three shifts: 05/22 1901 - 05/24 0700  In: 480 [P.O.:480]  Out: 4100 [Urine:4100]    Current Facility-Administered Medications   Medication Dose Route Frequency    fluconazole (DIFLUCAN) tablet 200 mg  200 mg Oral DAILY    cefTRIAXone (ROCEPHIN) 2 g in 0.9% sodium chloride (MBP/ADV) 50 mL MBP  2 g IntraVENous Q24H    albuterol-ipratropium (DUO-NEB) 2.5 MG-0.5 MG/3 ML  3 mL Nebulization Q6HWA RT    famotidine (PEPCID) tablet 20 mg  20 mg Oral BID    insulin lispro (HUMALOG) injection   SubCUTAneous AC&HS    insulin glargine (LANTUS) injection 7 Units  7 Units SubCUTAneous DAILY    0.9% sodium chloride infusion 250 mL  250 mL IntraVENous PRN    midodrine (PROAMITINE) tablet 10 mg  10 mg Oral Q8H    acetaminophen (TYLENOL) suppository 650 mg  650 mg Rectal Q6H PRN    diclofenac (VOLTAREN) 1 % topical gel 2 g  2 g Topical Q8H PRN    allopurinol (ZYLOPRIM) tablet 50 mg  50 mg Oral DAILY    aluminum-magnesium hydroxide (MAALOX) oral suspension 15 mL  15 mL Oral QID PRN    Lactobacillus Acidoph & Bulgar (FLORANEX) tablet 1 Tab  1 Tab Oral BID    sodium chloride (NS) flush 5-10 mL  5-10 mL IntraVENous Q8H    naloxone (NARCAN) injection 0.1 mg  0.1 mg IntraVENous Multiple    acetaminophen (TYLENOL) tablet 650 mg  650 mg Oral Q4H PRN    0.9% sodium chloride infusion 250 mL  250 mL IntraVENous PRN    diphenhydrAMINE (BENADRYL) capsule 50 mg  50 mg Oral Q4H PRN    polyethylene glycol (MIRALAX) packet 17 g  17 g Oral TID    cholecalciferol (VITAMIN D3) tablet 2,000 Units  2,000 Units Oral DAILY    docusate sodium (COLACE) capsule 100 mg  100 mg Oral DAILY AFTER BREAKFAST    oxyCODONE-acetaminophen (PERCOCET 7.5) 7.5-325 mg per tablet 1 Tab  1 Tab Oral Q4H PRN    pravastatin (PRAVACHOL) tablet 40 mg  40 mg Oral QHS    senna-docusate (PERICOLACE) 8.6-50 mg per tablet 2 Tab  2 Tab Oral PCD    folic acid (FOLVITE) tablet 1 mg  1 mg Oral DAILY    ondansetron (ZOFRAN) injection 4 mg  4 mg IntraVENous Q8H PRN    glucose chewable tablet 16 g  16 g Oral PRN    glucagon (GLUCAGEN) injection 1 mg  1 mg IntraMUSCular PRN    dextrose (D50W) injection syrg 12.5-25 g  25-50 mL IntraVENous PRN         Physical Exam:   NAD  ABDOMEN: soft, non-tender. Bowel sounds normal. No masses,  no organomegaly  FLANK:  NON  TENDER      Lab/Data Review:  BMP:   Lab Results   Component Value Date/Time    K 3.5 05/23/2017 08:56 PM     CMP:   Lab Results   Component Value Date/Time    K 3.5 05/23/2017 08:56 PM    MG 1.6 05/24/2017 05:00 AM     CBC:   Lab Results   Component Value Date/Time    WBC 13.3 (H) 05/24/2017 05:00 AM    HGB 8.5 (L) 05/24/2017 05:00 AM    HCT 26.4 (L) 05/24/2017 05:00 AM     05/24/2017 05:00 AM          CT Results:    Results from [place] encounter on 04/20/17   CT ABD PELV WO CONT   Narrative CT abdomen and pelvis without IV or oral contrast    INDICATION: Evaluate fluid collection and new left upper quadrant pain. Comparison May 4, 2017    All CT scans at this facility are performed using dose optimization technique as  appropriate to a performed exam, to include automated exposure control,  adjustment of the mA and/or kV according to patient size (including appropriate  matching for site specific examination) or use of iterative reconstruction  technique.     More consolidation/pneumonia in the right lower lobe. Less severe left lower  lobe pneumonia when compared to prior study. No significant effusion. Cardiomegaly with no pericardial effusion. The right psoas muscle is slightly smaller. Draining catheter is still in  place. No large fluid collection surrounding the catheter. Small residual  collection not completely excluded without IV contrast.    Also slightly smaller presacral fluid. Small abscess or loculation not  completely excluded but no focal collection identified. No new abnormalities in the left upper quadrant. No left upper quadrant  inflammation or free air. Splenomegaly is unchanged. Pancreas, liver, adrenal  glands and kidneys are stable with large left renal cyst. Aorta is normal in  caliber. IVC filter in place. More subcutaneous edema/anasarca along both sides. No discrete fluid collection. No bowel obstruction. Fluid in the colon, mild colitis, diarrheal disease  possible. However, moderate fecal material retention in the rectosigmoid colon  and descending colon noted. Impression IMPRESSION:  1. Smaller right psoas muscle implying smaller intramuscular fluid collection  and less edema/inflammation. Draining catheter is still in place. Limited  evaluation without IV contrast. Slightly smaller presacral fluid with no  discrete collection. 2. Less severe left lower lobe pulmonary consolidation but more severe right  lower lobe consolidation. No pleural effusion. 3. Worsening anasarca/subcutaneous edema on both sides. 4. More fluid in the ascending and transverse colon, colitis or diarrheal  disease? US Results:    Results from [place] encounter on 04/20/17   US CHEST   Narrative Ultrasound of the chest    HISTORY: Pleural effusion. COMPARISON: Chest x-ray May 16, 2017. FINDINGS: Images of the right-sided chest was performed. There is no significant  pleural fluid present for safe thoracentesis. Thoracentesis was not performed. Impression IMPRESSION: Insufficient volume of right pleural effusion for thoracentesis.           Assessment:     Principal Problem:    Acute paraplegia ([address] (4/20/2017)    Active Problems:    Type 2 diabetes mellitus with diabetic neuropathy ([address] (6/28/2011)      AICD generator infection ([address] (8/20/2012)      Overview: S/p explant 4 leads 8/20/12      Benign hypertensive heart disease with systolic CHF, NYHA class 2 ([address] (9/5/2012)      Decreased calculated glomerular filtration rate (GFR) (3/30/2017)      Overview: Calculated GFR equivalent to that of CKD stage 3 = 30-59 ml/min      Iliopsoas muscle hematoma (3/30/2017)      Psoas hematoma, right, secondary to anticoagulant therapy (3/30/2017)      Impaired mobility and ADLs (3/30/2017)      History of Coumadin therapy ()      Overview: Anticoagulation for chronic atrial fibrillation; Discontinued on 3/30/2017      Sepsis ([address] (4/20/2017)      Psoas abscess, right ([address] (4/20/2017)      Barnett catheter in place on admission (4/20/2017)      Urinary tract infection due to Enterococcus (4/20/2017)      Group B streptococcal infection (4/20/2017)        Plan:     WILL PLAN  TO LEAVE BARNETT IN PLACE TILL  REHAB COMPLETE AND CAN THEN  PLAN ELECTIVELY TO EVALUATE  BLADDER FUNCTION AFTER  \"SHOCK PHASE\"    OF SPINAL  CORD INJURY RESOLVED    Signed By: Glenda Bender MD , FACS    May 24, 2017      PAGER:  [phone]  CELL:  [facility]  OFFICE:  [address] [place] [address]   [phone]

## 2017-05-24 NOTE — PROGRESS NOTES
SUBJECTIVE:     Patient is feeling better. Dry cough off and on. No chest or abdominal pain. No foot pain. No headaches or dizziness.      OBJECTIVE:     Visit Vitals    /74 (BP 1 Location: Left arm, BP Patient Position: At rest)    Pulse 75    Temp 97 °F (36.1 °C)    Resp 18    Ht 5' 7\" (1.702 m)    Wt 110.9 kg (244 lb 6.4 oz)    SpO2 99%    Breastfeeding No    BMI 38.28 kg/m2       RS: Diminished in bases, no wheezes. CVS: RRR  GI: ND, BS +, NT   Extremities: TRACE  pedal edema  CNS: motor strength 0-1/5 in both LEs [L > R]. 5/5 un UEs. Normal sensation to touch in both LEs. No tremors. Skin:  Bilateral foot ulcers, Right greater than left. General: NAD, AWAKE, Follows commands     ASSESSMENT:     1. Sepsis secondary to infected right psoas hematoma/epidural abscess with neurological compromise resulting in paraplegia. S/p CT guided right new psoas muscle drainage catheter placement and s/p drain removal on 5/22/17. 2. Paraplegia since 4/20 likely due to presumed spinal cord infarct/septic thrombophlebitis. 3. H/o intermittent pain at the site of abdominal pacemaker, currently resolved  4. Heart block s/p AICD 2005 with upgrade to BiV later that year, removed however in 2012 due to trauma and infection. New biventricular pacemaker LUQ 9/2012 by Dr. 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 5. Elevated troponin likely demand ischemia in setting of sepsis. No ACS. 6. H/p transient nonischemic CMY with EF 45%  7. Acute kidney injury due to neurogenic bladder and Component of ATN. Resolved   8. Acute on chronic anemia s/p PRBCs  9. Diabetes mellitus. 10.Dyslipidemia. 6. H/o HTN. 12. LBBB. 13. HALI on CPAP. 14. Left shoulder pain  15. Atypical Chest pain   16. Hypoxia due to atelectasis  17. Bilateral LE DVT s/p IVC  18. Bibasilar atelectasis   19. Severe protein malnutrition   20. Bilateral foot ulcers, hospital acquired  21.  Wound Gluteal fold / cleft (Active) Skin tear HAC AND Lt buttock friction injury HAC     PLAN:     Cont antibiotic per ID  Cont current management.   Cont perez until becomes functional  Podiatrist to follow  Talked to CM about discharge process     --> TOTAL TIME GREATER THAN 30 MINUTES    CMP:   Lab Results   Component Value Date/Time    K 3.5 05/23/2017 08:56 PM    MG 1.6 05/24/2017 05:00 AM     CBC:   Lab Results   Component Value Date/Time    WBC 13.3 (H) 05/24/2017 05:00 AM    HGB 8.5 (L) 05/24/2017 05:00 AM    HCT 26.4 (L) 05/24/2017 05:00 AM     05/24/2017 05:00 AM

## 2017-05-24 NOTE — CDMP QUERY
IN ORDER TO REFLECT SEVERITY OF ILLNESS PT DOCUMENT PER [facility] RN  -Wound Gluteal fold / cleft (Active) Skin tear HAC  - Lt buttock friction injury HAC  - diabetic foot callouses POA    Thank you CDMP

## 2017-05-24 NOTE — ROUTINE PROCESS
Bedside and Verbal shift change report given to [facility]MENS LPN (oncoming nurse) by Sylvia Reynoso RN (offgoing nurse). Report included the following information SBAR, Kardex, MAR and Recent Results. SITUATION:    Code Status: Full Code  Reason for Admission: Acute paraplegia   Acute paraplegia [facility] day: 29   Problem List:       Hospital Problems  Date Reviewed: 4/20/2017          Codes Class Noted POA    * (Principal)Acute paraplegia ([facility] [address] [address] ICD-10-CM: G82.20  ICD-9-CM: 344.1  4/20/2017 Yes        Sepsis ([facility] [address] ICD-10-CM: A41.9  ICD-9-CM: 038.9, 995.91  4/20/2017 Yes        Psoas abscess, right ([facility] [address] ICD-10-CM: H85.17  ICD-9-CM: 567.31  4/20/2017 Yes        Krueger catheter in place on admission ICD-10-CM: Z96.0  ICD-9-CM: V45.89  4/20/2017 Yes        Urinary tract infection due to Enterococcus ICD-10-CM: N39.0, B95.2  ICD-9-CM: 599.0, 041.04  4/20/2017 Yes        Group B streptococcal infection ICD-10-CM: A49.1  ICD-9-CM: 041.02  4/20/2017 Yes        History of Coumadin therapy ICD-10-CM: Z79.01  ICD-9-CM: V58.61  Unknown Yes    Overview Signed 4/6/2017 10:35 PM by Kristine Felipe MD     Anticoagulation for chronic atrial fibrillation; Discontinued on 3/30/2017             Decreased calculated glomerular filtration rate (GFR) ICD-10-CM: R94.4  ICD-9-CM: 794.4  3/30/2017 Yes    Overview Signed 4/6/2017  5:47 PM by Kristine Felipe MD     Calculated GFR equivalent to that of CKD stage 3 = 30-59 ml/min             Iliopsoas muscle hematoma ICD-10-CM: S70.10XA  ICD-9-CM: 924.00  3/30/2017 Yes        Psoas hematoma, right, secondary to anticoagulant therapy ICD-10-CM: S30. 1XXA  ICD-9-CM: 924.9, E934.2  3/30/2017 Yes        Impaired mobility and ADLs ICD-10-CM: Z74.09  ICD-9-CM: 799.89  3/30/2017 Yes        Benign hypertensive heart disease with systolic CHF, NYHA class 2 (HCC) (Chronic) ICD-10-CM: I11.0, I50.20  ICD-9-CM: 402.11, 428.20, 428.0  9/5/2012 Yes        AICD generator infection ([facility] [address] [address] ICD-10-CM: T82. 7XXA  ICD-9-CM: 996.61  8/20/2012 Yes    Overview Signed 8/20/2012  6:13 PM by Luly Escobar MD     S/p explant 4 leads 8/20/12             Type 2 diabetes mellitus with diabetic neuropathy (HCC) (Chronic) ICD-10-CM: E11.40  ICD-9-CM: 250.60, 357.2  6/28/2011 Yes              BACKGROUND:    Past Medical History:   Past Medical History:   Diagnosis Date    Acute paraplegia ([facility] 4/20/2017    Benign hypertensive heart disease with systolic CHF, NYHA class 2 ([address] 9/5/2012    Biventricular implantable cardioverter-defibrillator in situ 04/28/2005    Upgraded to BiV AICD; gen change 4/2008; pocket revision 10/2009; Abdominal - done on 8/22/2012 by Dr. Carmen Sierra Cardiac cath 08/15/1996    Patent coronaries. Elev LVEDP. EF 50-55%.  Cardiac echocardiogram 06/23/2015    Ltd study. EF 45-50%. Mild, diffuse hypk. Severe apical hypk. No mass or thrombus was clearly identified, although imaging was suboptimal.      Cardiac nuclear imaging test 06/19/2015    Fixed distal apical, distal septal defect more likely due to RV pacing than prior infarct. No ischemia. EF 46%. RWMA c/w RV pacing. Nondiagnostic EKG on pharm stress test.      Cardiovascular lower extremity venous duplex 09/04/2012    Acute, non-occlusive DVT in CFV on right. No DVT on left. No superficial thrombosis bilaterally.  Cardiovascular upper extremity venous duplex 08/27/2012    DVT in axillary vein on left. Left subclavian was not visualized.     Chronic anemia 9/5/2012    Chronic systolic heart failure (HCC)     Decreased calculated glomerular filtration rate (GFR) 3/30/2017    Calculated GFR equivalent to that of CKD stage 3 = 30-59 ml/min    Diabetic neuropathy associated with type 2 diabetes mellitus ([facility] 6/28/2011    Difficult airway for intubation 08/22/2012    see anesthesia airway note    Dyslipidemia 6/28/2011    Gout     History of complete heart block 6/28/2011    History of Coumadin therapy Anticoagulation for DVT of the LUE; Discontinued on 3/30/2017    History of deep venous thrombosis 9/5/2012    Left upper extremity    History of pyelonephritis 3/30/2017    Left bundle branch block (LBBB) on electrocardiogram 6/28/2011    Nonischemic cardiomyopathy ([facility] 6/28/2011    Obesity (BMI 35.0-39.9 without comorbidity) ([facility] 3/13/2017    Obstructive sleep apnea on CPAP 2/7/2012    Psoas abscess, right ([facility] 4/20/2017    Psoas hematoma, right, secondary to anticoagulant therapy 3/30/2017    Type 2 diabetes mellitus with diabetic neuropathy ([facility] 6/28/2011         Patient taking anticoagulants no     ASSESSMENT:    Changes in Assessment Throughout Shift: no     Patient has Central Line: yes Reasons if yes: poor veins   Patient has Krueger Cath: yes Reasons if yes: retention      Last Vitals:     Vitals:    05/24/17 0000 05/24/17 0500 05/24/17 0803 05/24/17 0814   BP: 142/74 141/72 138/74    Pulse: 82 67 75    Resp: 18 18 18    Temp: 97.4 °F (36.3 °C) 98.5 °F (36.9 °C) 97 °F (36.1 °C)    TempSrc:       SpO2: 99% 99% 99% 99%   Weight:  110.9 kg (244 lb 6.4 oz)     Height:            IV and DRAINS (will only show if present)   [REMOVED] Drain 8.5 FR Right psoas muscle drain 05/10/17 Right Other (comment)-Site Assessment: Clean, dry, & intact  [REMOVED] Drain 04/20/17 Right Other (comment)-Site Assessment: Clean, dry, & intact  [REMOVED] Peripheral IV 04/20/17 Left Arm-Site Assessment: Clean, dry, & intact  [REMOVED] Peripheral IV 04/20/17 Right Antecubital-Site Assessment: Clean, dry, & intact  [REMOVED] Triple Lumen Triple Lumen CVL 04/20/17 Left Other(comment)-Site Assessment: Clean, dry, & intact  [REMOVED] Peripheral IV 04/21/17 Left Arm-Site Assessment: Clean, dry, & intact  [REMOVED] Peripheral IV 04/25/17 Left Forearm-Site Assessment: Clean, dry, & intact  PICC Double Lumen 98/59/92 Right;Basilic-Site Assessment: Clean, dry, & intact     WOUND (if present)   Wound Type:  none   Dressing present: yes sacrum   Wound Concerns/Notes:  none     PAIN    Pain Assessment    Pain Intensity 1: 0 (05/24/17 0400)    Pain Location 1: Leg    Pain Intervention(s) 1: Medication (see MAR)    Patient Stated Pain Goal: 0  o Interventions for Pain:  tylenol  o Intervention effective: yes  o Time of last intervention: 0640  o Reassessment Completed: yes      Last 3 Weights:  Last 3 Recorded Weights in this Encounter    05/20/17 1539 05/23/17 0722 05/24/17 0500   Weight: 114.2 kg (251 lb 12.3 oz) 111.8 kg (246 lb 7.6 oz) 110.9 kg (244 lb 6.4 oz)     Weight change:      INTAKE/OUPUT    Current Shift:      Last three shifts: 05/22 1901 - 05/24 0700  In: 480 [P.O.:480]  Out: 4100 [Urine:4100]     LAB RESULTS     Recent Labs      05/24/17   0500  05/23/17   0528  05/22/17   0449   WBC  13.3*  13.5*  11.3   HGB  8.5*  8.5*  8.4*   HCT  26.4*  26.2*  26.0*   PLT  219  193  188        Recent Labs      05/24/17   0500  05/23/17   2056  05/23/17   0528 05/22/17   0449   NA   --    --   138  139   K   --   3.5  3.5  3.6   GLU   --    --   77  79   BUN   --    --   13  14   CREA   --    --   1.09  1.08   CA   --    --   8.5  8.5   MG  1.6   --    --    --        RECOMMENDATIONS AND DISCHARGE PLANNING     1. Pending tests/procedures/ Plan of Care or Other Needs: Labs, perez cath care    2. Discharge plan for patient and Needs/Barriers: Rehab    3. Estimated Discharge Date: 5/28/17 Posted on Whiteboard in Patients Room: no      4. The patient's care plan was reviewed with the oncoming nurse. \"HEALS\" SAFETY CHECK      Fall Risk    Total Score: 3    Safety Measures: Safety Measures: Bed/Chair-Wheels locked, Bed in low position, Call light within reach    A safety check occurred in the patient's room between off going nurse and oncoming nurse listed above.     The safety check included the below items  Area Items   H  High Alert Medications - Verify all high alert medication drips (heparin, PCA, etc.)   E  Equipment - Suction is set up for ALL patients (with vania)  - Red plugs utilized for all equipment (IV pumps, etc.)  - WOWs wiped down at end of shift.  - Room stocked with oxygen, suction, and other unit-specific supplies   A  Alarms - Bed alarm is set for fall risk patients  - Ensure chair alarm is in place and activated if patient is up in a chair   L  Lines - Check IV for any infiltration  - Krueger bag is empty if patient has a Krueger   - Tubing and IV bags are labeled   S  Safety   - Room is clean, patient is clean, and equipment is clean. - Hallways are clear from equipment besides carts. - Fall bracelet on for fall risk patients  - Ensure room is clear and free of clutter  - Suction is set up for ALL patients (with vania)  - Hallways are clear from equipment besides carts.    - Isolation precautions followed, supplies available outside room, sign posted     Issa Gonzalez RN

## 2017-05-24 NOTE — PROGRESS NOTES
NUTRITION    Nutrition Consult: General Nutrition Management & Supplements      RECOMMENDATIONS / PLAN:     - Change supplements to Glucerna Shake TID  - Recommend starting appetite stimulant  - Family to provide food as pt desires, staying consistent with diet order   - Continue RD inpatient monitoring and evaluation. NUTRITION INTERVENTIONS & DIAGNOSIS:     [x] Meals/Snacks: modified diet  [x] Medical food supplementation:  Ensure Pudding TID  [x] Vitamin/mineral supplementation: ergocalciferol, folic acid     Nutrition Diagnosis: Inadequate oral intake related to decreased appetite as evidenced by poor meal intake PTA and since admission. ASSESSMENT:     Subjective/Objective: Appetite and po intake decreasing; poor/fair currently. Pt does not like Ensure Pudding supplements. Rediscussed options; pt agreeable to try Glucerna Shakes again. Discussed with pt that family can provide food; stay consistent with diet order; pt verbalized understanding and stated family has provided food.  Encouraged po intake of meals and supplements    Average po intake adequate to meet patients estimated nutritional needs:   [] Yes     [x] No   [] Unable to determine at this time    Diet: DIET NUTRITIONAL SUPPLEMENTS All Meals; ENSURE PUDDING  DIET DIABETIC CONSISTENT CARB Soft Solids     Food Allergies: blueberry   Current Appetite:   [] Good     [x] Fair     [] Poor     [] Other:   Appetite/meal intake prior to admission:   [] Good     [] Fair     [x] Poor   [] Other:  Feeding Limitations:  [x] Swallowing difficulty: SLP following    [] Chewing difficulty    [] Other:   Current Meal Intake:   Patient Vitals for the past 100 hrs:   % Diet Eaten   05/23/17 1811 25 %   05/23/17 1335 0 %   05/23/17 1000 20 %   05/22/17 1838 25 %   05/22/17 1416 20 %   05/22/17 0935 5 %     BM: 5/23  Skin Integrity: callus on right foot; abrasion on right buttocks  Edema:  2+ generalized, sacral, LUE, RLE;  1+ genital, LLE, RUE  Pertinent Medications: Reviewed: bowel regimen, zofran, maalox     Recent Labs      05/24/17   0500  05/23/17   2056  05/23/17   0528  05/22/17   0449   NA   --    --   138  139   K   --   3.5  3.5  3.6   CL   --    --   100  102   CO2   --    --   30  29   GLU   --    --   77  79   BUN   --    --   13  14   CREA   --    --   1.09  1.08   CA   --    --   8.5  8.5   MG  1.6   --    --    --    ALB   --    --   2.0*  2.1*   SGOT   --    --   33  39*   ALT   --    --   22  22       Intake/Output Summary (Last 24 hours) at 05/24/17 1718  Last data filed at 05/23/17 1811   Gross per 24 hour   Intake              240 ml   Output              800 ml   Net             -560 ml       Anthropometrics:  Ht Readings from Last 1 Encounters:   05/18/17 5' 7\" (1.702 m)     Last 3 Recorded Weights in this Encounter    05/20/17 1539 05/23/17 0722 05/24/17 0500   Weight: 114.2 kg (251 lb 12.3 oz) 111.8 kg (246 lb 7.6 oz) 110.9 kg (244 lb 6.4 oz)     Body mass index is 38.28 kg/(m^2). Obese, Class III           Weight History: Per chart review, patient reported usual weight of 240 lb       Weight Metrics 5/24/2017 4/20/2017 4/6/2017 4/6/2017 3/30/2017 3/16/2017 3/14/2017   Weight 244 lb 6.4 oz - 233 lb 227 lb 1.6 oz - 240 lb 243 lb   BMI - 38.28 kg/m2 36.49 kg/m2 - 35.57 kg/m2 37.59 kg/m2 38.06 kg/m2        Admitting Diagnosis: Acute paraplegia  Acute paraplegia [facility])  Past Medical History:   Diagnosis Date    Acute paraplegia ([facility] 4/20/2017    Benign hypertensive heart disease with systolic CHF, NYHA class 2 ([facility] 9/5/2012    Biventricular implantable cardioverter-defibrillator in situ 04/28/2005    Upgraded to BiV AICD; gen change 4/2008; pocket revision 10/2009; Abdominal - done on 8/22/2012 by Dr. Rajesh Quinn Cardiac cath 08/15/1996    Patent coronaries. Elev LVEDP. EF 50-55%.  Cardiac echocardiogram 06/23/2015    Ltd study. EF 45-50%. Mild, diffuse hypk. Severe apical hypk.   No mass or thrombus was clearly identified, although imaging was suboptimal.      Cardiac nuclear imaging test 06/19/2015    Fixed distal apical, distal septal defect more likely due to RV pacing than prior infarct. No ischemia. EF 46%. RWMA c/w RV pacing. Nondiagnostic EKG on pharm stress test.      Cardiovascular lower extremity venous duplex 09/04/2012    Acute, non-occlusive DVT in CFV on right. No DVT on left. No superficial thrombosis bilaterally.  Cardiovascular upper extremity venous duplex 08/27/2012    DVT in axillary vein on left. Left subclavian was not visualized.     Chronic anemia 9/5/2012    Chronic systolic heart failure (HCC)     Decreased calculated glomerular filtration rate (GFR) 3/30/2017    Calculated GFR equivalent to that of CKD stage 3 = 30-59 ml/min    Diabetic neuropathy associated with type 2 diabetes mellitus ([address] 6/28/2011    Difficult airway for intubation 08/22/2012    see anesthesia airway note    Dyslipidemia 6/28/2011    Gout     History of complete heart block 6/28/2011    History of Coumadin therapy     Anticoagulation for DVT of the LUE; Discontinued on 3/30/2017    History of deep venous thrombosis 9/5/2012    Left upper extremity    History of pyelonephritis 3/30/2017    Left bundle branch block (LBBB) on electrocardiogram 6/28/2011    Nonischemic cardiomyopathy ([address] 6/28/2011    Obesity (BMI 35.0-39.9 without comorbidity) ([address] 3/13/2017    Obstructive sleep apnea on CPAP 2/7/2012    Psoas abscess, right ([address] 4/20/2017    Psoas hematoma, right, secondary to anticoagulant therapy 3/30/2017    Type 2 diabetes mellitus with diabetic neuropathy ([address] 6/28/2011       Education Needs:        [x] None identified  [] Identified - Not appropriate at this time  []  Identified and addressed - refer to education log  Learning Limitations:   [x] None identified  [] Identified    Cultural, Gnosticism & ethnic food preferences:  [x] None identified    [] Identified and addressed     ESTIMATED NUTRITION NEEDS:     Calories: 9724-8302 kcal (MSJx1.2-1.3) based on  [x] Actual BW: 111 kg      [] IBW   CHO: 252-273 gm (50% kcal)   Protein:  gm (0.8-1 gm/kg) based on  [x] Actual BW      [] IBW   Fluid: 1 mL/kcal     MONITORING & EVALUATION:     Nutrition Goal(s):   1. Nutritional needs will be met through adequate oral intake or nutrition support within the next 7 days.   Outcome:  [] Met/Ongoing    [x]  Not Met: Progressing    [] New/Initial Goal     Monitoring:   [x] Diet tolerance   [x] Meal intake   [x] Supplement intake   [] GI symptoms/ability to tolerate po diet   [] Respiratory status   [] Plan of care      Previous Recommendations (for follow-up assessments only):     [x]   Implemented       []   Not Implemented (RD to address)     [] No Recommendation Made     Discharge Planning: diabetic, cardiac diet, consistency as tolerated per SLP  [x] Participated in care planning, discharge planning, & interdisciplinary rounds as appropriate      Karen Reeves, [address] [place] [address]    Pager: [phone]

## 2017-05-25 VITALS
SYSTOLIC BLOOD PRESSURE: 133 MMHG | RESPIRATION RATE: 18 BRPM | BODY MASS INDEX: 38.36 KG/M2 | HEIGHT: 67 IN | WEIGHT: 244.4 LBS | HEART RATE: 76 BPM | TEMPERATURE: 98 F | DIASTOLIC BLOOD PRESSURE: 79 MMHG | OXYGEN SATURATION: 96 %

## 2017-05-25 LAB
GLUCOSE BLD STRIP.AUTO-MCNC: 111 MG/DL (ref 70–110)
GLUCOSE BLD STRIP.AUTO-MCNC: 87 MG/DL (ref 70–110)
MAGNESIUM SERPL-MCNC: 1.9 MG/DL (ref 1.6–2.6)
POTASSIUM SERPL-SCNC: 4 MMOL/L (ref 3.5–5.5)
WBC # BLD AUTO: 12.3 K/UL (ref 4.6–13.2)

## 2017-05-25 PROCEDURE — 74011000250 HC RX REV CODE- 250: Performed by: PHYSICIAN ASSISTANT

## 2017-05-25 PROCEDURE — 74011250637 HC RX REV CODE- 250/637: Performed by: HOSPITALIST

## 2017-05-25 PROCEDURE — 74011250637 HC RX REV CODE- 250/637: Performed by: INTERNAL MEDICINE

## 2017-05-25 PROCEDURE — 85048 AUTOMATED LEUKOCYTE COUNT: CPT | Performed by: INTERNAL MEDICINE

## 2017-05-25 PROCEDURE — 36415 COLL VENOUS BLD VENIPUNCTURE: CPT | Performed by: INTERNAL MEDICINE

## 2017-05-25 PROCEDURE — 94640 AIRWAY INHALATION TREATMENT: CPT

## 2017-05-25 PROCEDURE — 83735 ASSAY OF MAGNESIUM: CPT | Performed by: INTERNAL MEDICINE

## 2017-05-25 PROCEDURE — 97110 THERAPEUTIC EXERCISES: CPT

## 2017-05-25 PROCEDURE — 74011000258 HC RX REV CODE- 258: Performed by: INTERNAL MEDICINE

## 2017-05-25 PROCEDURE — 82962 GLUCOSE BLOOD TEST: CPT

## 2017-05-25 PROCEDURE — 74011250636 HC RX REV CODE- 250/636: Performed by: INTERNAL MEDICINE

## 2017-05-25 PROCEDURE — 74011636637 HC RX REV CODE- 636/637: Performed by: INTERNAL MEDICINE

## 2017-05-25 PROCEDURE — 97535 SELF CARE MNGMENT TRAINING: CPT

## 2017-05-25 PROCEDURE — 77010033678 HC OXYGEN DAILY: Performed by: HOSPITALIST

## 2017-05-25 PROCEDURE — 84132 ASSAY OF SERUM POTASSIUM: CPT | Performed by: INTERNAL MEDICINE

## 2017-05-25 RX ORDER — FAMOTIDINE 20 MG/1
20 TABLET, FILM COATED ORAL
Qty: 30 TAB | Refills: 0 | Status: SHIPPED
Start: 2017-05-25 | End: 2019-02-07 | Stop reason: ALTCHOICE

## 2017-05-25 RX ORDER — LANOLIN ALCOHOL/MO/W.PET/CERES
325 CREAM (GRAM) TOPICAL 2 TIMES DAILY WITH MEALS
Qty: 28 TAB | Refills: 0 | Status: SHIPPED
Start: 2017-05-25 | End: 2017-06-22

## 2017-05-25 RX ORDER — FOLIC ACID 1 MG/1
1 TABLET ORAL DAILY
Qty: 30 TAB | Refills: 0 | Status: ON HOLD
Start: 2017-05-25 | End: 2019-12-16

## 2017-05-25 RX ORDER — INSULIN GLARGINE 100 [IU]/ML
5 INJECTION, SOLUTION SUBCUTANEOUS
Qty: 1 VIAL | Refills: 0 | Status: ON HOLD
Start: 2017-05-25 | End: 2019-12-16 | Stop reason: SDUPTHER

## 2017-05-25 RX ORDER — IPRATROPIUM BROMIDE AND ALBUTEROL SULFATE 2.5; .5 MG/3ML; MG/3ML
3 SOLUTION RESPIRATORY (INHALATION) 4 TIMES DAILY
Qty: 30 NEBULE | Refills: 0 | Status: SHIPPED
Start: 2017-05-25 | End: 2018-03-12

## 2017-05-25 RX ORDER — POLYETHYLENE GLYCOL 3350 17 G/17G
17 POWDER, FOR SOLUTION ORAL 2 TIMES DAILY
Qty: 30 PACKET | Refills: 0 | Status: SHIPPED
Start: 2017-05-25 | End: 2018-03-12

## 2017-05-25 RX ORDER — POLYETHYLENE GLYCOL 3350 17 G/17G
17 POWDER, FOR SOLUTION ORAL 3 TIMES DAILY
Qty: 30 PACKET | Refills: 0 | Status: SHIPPED
Start: 2017-05-25 | End: 2017-05-25

## 2017-05-25 RX ORDER — ALLOPURINOL 100 MG/1
50 TABLET ORAL DAILY
Qty: 30 TAB | Refills: 0 | Status: SHIPPED
Start: 2017-05-25 | End: 2018-03-12

## 2017-05-25 RX ORDER — UREA 10 %
1 LOTION (ML) TOPICAL 2 TIMES DAILY
Qty: 42 TAB | Refills: 0 | Status: SHIPPED
Start: 2017-05-25 | End: 2017-06-22

## 2017-05-25 RX ORDER — MIDODRINE HYDROCHLORIDE 2.5 MG/1
TABLET ORAL
Qty: 45 TAB | Refills: 0 | Status: SHIPPED
Start: 2017-05-25 | End: 2018-03-12

## 2017-05-25 RX ORDER — OXYCODONE AND ACETAMINOPHEN 7.5; 325 MG/1; MG/1
1 TABLET ORAL
Qty: 20 TAB | Refills: 0 | Status: SHIPPED | OUTPATIENT
Start: 2017-05-25 | End: 2017-06-22

## 2017-05-25 RX ORDER — FLUCONAZOLE 200 MG/1
200 TABLET ORAL DAILY
Qty: 6 TAB | Refills: 0 | Status: SHIPPED
Start: 2017-05-25 | End: 2017-05-31

## 2017-05-25 RX ADMIN — FAMOTIDINE 20 MG: 20 TABLET ORAL at 08:21

## 2017-05-25 RX ADMIN — LACTOBACILLUS TAB 1 TABLET: TAB at 08:26

## 2017-05-25 RX ADMIN — INSULIN GLARGINE 5 UNITS: 100 INJECTION, SOLUTION SUBCUTANEOUS at 09:00

## 2017-05-25 RX ADMIN — ALLOPURINOL 50 MG: 100 TABLET ORAL at 08:21

## 2017-05-25 RX ADMIN — ACETAMINOPHEN 650 MG: 500 TABLET ORAL at 08:19

## 2017-05-25 RX ADMIN — MIDODRINE HYDROCHLORIDE 10 MG: 2.5 TABLET ORAL at 08:21

## 2017-05-25 RX ADMIN — FOLIC ACID 1 MG: 1 TABLET ORAL at 08:20

## 2017-05-25 RX ADMIN — IPRATROPIUM BROMIDE AND ALBUTEROL SULFATE 3 ML: .5; 3 SOLUTION RESPIRATORY (INHALATION) at 07:53

## 2017-05-25 RX ADMIN — CEFTRIAXONE 2 G: 2 INJECTION, POWDER, FOR SOLUTION INTRAMUSCULAR; INTRAVENOUS at 11:37

## 2017-05-25 RX ADMIN — FLUCONAZOLE 200 MG: 200 TABLET ORAL at 08:20

## 2017-05-25 RX ADMIN — MIDODRINE HYDROCHLORIDE 10 MG: 2.5 TABLET ORAL at 01:59

## 2017-05-25 RX ADMIN — IPRATROPIUM BROMIDE AND ALBUTEROL SULFATE 3 ML: .5; 3 SOLUTION RESPIRATORY (INHALATION) at 14:14

## 2017-05-25 RX ADMIN — DOCUSATE SODIUM 100 MG: 100 CAPSULE, LIQUID FILLED ORAL at 08:21

## 2017-05-25 RX ADMIN — CHOLECALCIFEROL TAB 25 MCG (1000 UNIT) 2000 UNITS: 25 TAB at 08:20

## 2017-05-25 RX ADMIN — POLYETHYLENE GLYCOL 3350 17 G: 17 POWDER, FOR SOLUTION ORAL at 08:26

## 2017-05-25 RX ADMIN — Medication 10 ML: at 15:31

## 2017-05-25 RX ADMIN — Medication 10 ML: at 05:35

## 2017-05-25 NOTE — PROGRESS NOTES
SBAR report given to Teja Li RN of [address] [place] [address] . All questions and concerns addressed at this time. Pt d/c'd with PICC line and perez catheter. Off unit in stable condition.

## 2017-05-25 NOTE — PROGRESS NOTES
NUTRITION    Nutrition Consult: General Nutrition Management & Supplements   (late note)     RECOMMENDATIONS / PLAN:     - Continue nutrition supplements; continue with family providing food as pt desires  - Continue RD inpatient monitoring and evaluation. NUTRITION INTERVENTIONS & DIAGNOSIS:     [x] Meals/Snacks: modified diet  [x] Medical food supplementation:  Glucerna Shake TID  [x] Vitamin/mineral supplementation: ergocalciferol, folic acid   [x] Coordination of care: pt discussed with MD    Nutrition Diagnosis: Inadequate oral intake related to decreased appetite as evidenced by poor meal intake PTA and since admission. ASSESSMENT:     Subjective/Objective: Appetite and meal intake variable; recently decreased. Fair intake supplements. Family provides food sometimes. Discussed with MD regarding adding appetite stimulant; per MD will recommend for pt to be followed by RD at [facility] for possible need for appetite stimulant.  Pt discharging today; unable to monitor affect of medication    Average po intake adequate to meet patients estimated nutritional needs:   [] Yes     [x] No   [] Unable to determine at this time    Diet: DIET DIABETIC CONSISTENT CARB Soft Solids  DIET NUTRITIONAL SUPPLEMENTS All Meals; [address] SHAKE  DIET DIABETIC CONSISTENT CARB Regular; AHA-LOW-CHOL FAT     Food Allergies: blueberry   Current Appetite:   [] Good     [x] Fair     [] Poor     [] Other:   Appetite/meal intake prior to admission:   [] Good     [] Fair     [x] Poor   [] Other:  Feeding Limitations:  [x] Swallowing difficulty: SLP following    [] Chewing difficulty    [] Other:   Current Meal Intake:   Patient Vitals for the past 100 hrs:   % Diet Eaten   05/25/17 1001 60 %   05/23/17 1811 25 %   05/23/17 1335 0 %   05/23/17 1000 20 %   05/22/17 1838 25 %   05/22/17 1416 20 %   05/22/17 0935 5 %     BM: 5/23  Skin Integrity: callus on right foot; abrasion on right buttocks; gluteal pressure ulcer  Edema:  2+ generalized, sacral, LUE, RLE;  1+ LLE, RUE  Pertinent Medications: Reviewed: bowel regimen, zofran, maalox     Recent Labs      05/25/17   0309  05/24/17   0500  05/23/17 2056  05/23/17   0528   NA   --    --    --   138   K  4.0   --   3.5  3.5   CL   --    --    --   100   CO2   --    --    --   30   GLU   --    --    --   77   BUN   --    --    --   13   CREA   --    --    --   1.09   CA   --    --    --   8.5   MG  1.9  1.6   --    --    ALB   --    --    --   2.0*   SGOT   --    --    --   33   ALT   --    --    --   22       Intake/Output Summary (Last 24 hours) at 05/25/17 1535  Last data filed at 05/25/17 1001   Gross per 24 hour   Intake              340 ml   Output             1575 ml   Net            -1235 ml       Anthropometrics:  Ht Readings from Last 1 Encounters:   05/18/17 5' 7\" (1.702 m)     Last 3 Recorded Weights in this Encounter    05/23/17 0722 05/24/17 0500 05/25/17 0759   Weight: 111.8 kg (246 lb 7.6 oz) 110.9 kg (244 lb 6.4 oz) 110.9 kg (244 lb 6.4 oz)     Body mass index is 38.28 kg/(m^2). Obese, Class III           Weight History: Per chart review, patient reported usual weight of 240 lb       Weight Metrics 5/25/2017 4/20/2017 4/6/2017 4/6/2017 3/30/2017 3/16/2017 3/14/2017   Weight 244 lb 6.4 oz - 233 lb 227 lb 1.6 oz - 240 lb 243 lb   BMI - 38.28 kg/m2 36.49 kg/m2 - 35.57 kg/m2 37.59 kg/m2 38.06 kg/m2        Admitting Diagnosis: Acute paraplegia  Acute paraplegia [facility])  Past Medical History:   Diagnosis Date    [facility] ([facility] 4/20/2017    Benign hypertensive heart disease with systolic CHF, NYHA class 2 ([facility] 9/5/2012    Biventricular implantable cardioverter-defibrillator in situ 04/28/2005    Upgraded to BiV AICD; gen change 4/2008; pocket revision 10/2009; Abdominal - done on 8/22/2012 by Dr. Ana Soto Cardiac cath 08/15/1996    Patent coronaries. Elev LVEDP. EF 50-55%.  Cardiac echocardiogram 06/23/2015    Ltd study. EF 45-50%. Mild, diffuse hypk.   Severe apical hypk.  No mass or thrombus was clearly identified, although imaging was suboptimal.      Cardiac nuclear imaging test 06/19/2015    Fixed distal apical, distal septal defect more likely due to RV pacing than prior infarct. No ischemia. EF 46%. RWMA c/w RV pacing. Nondiagnostic EKG on pharm stress test.      Cardiovascular lower extremity venous duplex 09/04/2012    Acute, non-occlusive DVT in CFV on right. No DVT on left. No superficial thrombosis bilaterally.  Cardiovascular upper extremity venous duplex 08/27/2012    DVT in axillary vein on left. Left subclavian was not visualized.     Chronic anemia 9/5/2012    Chronic systolic heart failure (HCC)     Decreased calculated glomerular filtration rate (GFR) 3/30/2017    Calculated GFR equivalent to that of CKD stage 3 = 30-59 ml/min    Diabetic neuropathy associated with type 2 diabetes mellitus ([address] 6/28/2011    Difficult airway for intubation 08/22/2012    see anesthesia airway note    Dyslipidemia 6/28/2011    Gout     History of complete heart block 6/28/2011    History of Coumadin therapy     Anticoagulation for DVT of the LUE; Discontinued on 3/30/2017    History of deep venous thrombosis 9/5/2012    Left upper extremity    History of pyelonephritis 3/30/2017    Left bundle branch block (LBBB) on electrocardiogram 6/28/2011    Nonischemic cardiomyopathy ([address] 6/28/2011    Obesity (BMI 35.0-39.9 without comorbidity) ([address] 3/13/2017    Obstructive sleep apnea on CPAP 2/7/2012    Psoas abscess, right ([address] 4/20/2017    Psoas hematoma, right, secondary to anticoagulant therapy 3/30/2017    Type 2 diabetes mellitus with diabetic neuropathy ([address] 6/28/2011       Education Needs:        [x] None identified  [] Identified - Not appropriate at this time  []  Identified and addressed - refer to education log  Learning Limitations:   [x] None identified  [] Identified    Cultural, Worship & ethnic food preferences:  [x] None identified [] Identified and addressed     ESTIMATED NUTRITION NEEDS:     Calories: 1796-8691 kcal (MSJx1.2-1.3) based on  [x] Actual BW: 111 kg      [] IBW   CHO: 252-273 gm (50% kcal)   Protein:  gm (0.8-1 gm/kg) based on  [x] Actual BW      [] IBW   Fluid: 1 mL/kcal     MONITORING & EVALUATION:     Nutrition Goal(s):   1. Nutritional needs will be met through adequate oral intake or nutrition support within the next 7 days.   Outcome:  [] Met/Ongoing    [x]  Not Met: Progressing    [] New/Initial Goal     Monitoring:   [x] Diet tolerance   [x] Meal intake   [x] Supplement intake   [] GI symptoms/ability to tolerate po diet   [] Respiratory status   [] Plan of care      Previous Recommendations (for follow-up assessments only):     []   Implemented       [x]   Not Implemented (RD to address)     [] No Recommendation Made     Discharge Planning: diabetic, cardiac diet, consistency as tolerated per SLP  [x] Participated in care planning, discharge planning, & interdisciplinary rounds as appropriate      Nael Rai, [address] [place] [address]    Pager: [phone]

## 2017-05-25 NOTE — PROGRESS NOTES
Pt in stable condition, A&Ox4. Active BS, pt reports passing flatus, BM today. Breath sounds are clear at this time, diminished of the lower lobes. Diet tolerated well, denies nausea and vomiting. Krueger catheter intact and draining yellow clear urine. No pain reported. Will monitor.

## 2017-05-25 NOTE — DISCHARGE SUMMARY
[address] [facility]  TRANSFER SUMMARY    Name:  Colton Kelley  MR#:  [id number]  :  1950  Account #:  [de-identified]  Date of Adm:  2017  Date of Transfer:      DATE OF ADMISSION: 2017    DATE OF DISCHARGE: 2017    DISPOSITION: Discharged to skilled nursing facility. DISCHARGE CONDITION: Stable. DISCHARGE DIAGNOSES  1. Sepsis secondary to #2, now resolved. 2. Infected right psoas hematoma/epidural abscess with neurological  compromise, resulting in paraplegia, status post CT-guided drainage. 3. 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 4. History of intermittent pain at abdominal pacemaker site, much  better now. 5. History of heart block, status post automatic implantable cardioverter  defibrillator in , which was removed due to infection, and had  pacemaker placement done in the left upper quadrant. 6. Elevated troponin due to demand ischemia in the setting of sepsis. 7. History of transient nonischemic cardiomyopathy with ejection  fraction of 45%. 8. Acute kidney injury due to neurogenic bladder and component of  acute tubular necrosis, now resolved. 9. Urinary retention, requiring chronic catheter. 10. Acute on chronic anemia, requiring multiple blood transfusions. 11. Bilateral lower extremity deep venous thrombosis, status post  inferior vena cava. 12. Diabetes mellitus. 13. Dyslipidemia. 14. History of hypertension. 15. Left bundle branch block. 16. Obstructive sleep apnea. 17. Atypical chest pain, resolved. 18. Hypoxia, due to atelectasis, improving. 19. Bibasilar atelectasis. 20. Bilateral foot callus, present on admission. 21. Severe protein malnutrition. 22. Gluteal fold skin tear and left buttock friction injury, hospital-  acquired. DISCHARGE MEDICATIONS  1. Rocephin 2 grams every 24 hours until 2017.  2. DuoNeb q.6 hours.   3. Oxygen 1 L via nasal cannula for the next 2 days and then remove  her off oxygen and check oxygen saturation. Do not use it if pulse  oximetry is greater than 91% on room air. 4. Midodrine 5 mg 3 times a day for 5 days, then 2.5 mg 3 times a day  for 5 days, then stop. 5. Diflucan 200 mg for 6 days. 6. Floranex 1 tablet b.i.d. for 21 days. 7. Maalox 15 mL 4 times a day as needed for indigestion. 8. Folvite 1 mg daily. 9. Pepcid 20 mg daily. 10. MiraLAX 1 packet b.i.d., hold for diarrhea. 11. Percocet 7.5 mg q.6 hours p.r.n. for pain. 12. Allopurinol 50 mg daily. 13. Insulin Lantus 5 units subcutaneous daily. 14. Humalog sliding scale. 15. Ferrous sulfate 325 mg b.i.d. for 14 days. 16. Zofran 4 mg q.8 hours p.r.n. for nausea. 17. Tylenol 650 mg q.4 hours p.r.n. for pain or fever. 18. Vitamin D 2000 units daily. 19. Negrita-Colace 2 tablets p.o. daily. 20. Pravachol 40 mg at bedtime. IMAGING AND PROCEDURES  1. CT abdomen and pelvis without contrast done on 04/19/2017  showed right psoas muscle collection has increased in size. 2. CT scan of the L-spine was done on 04/19/2017, which showed  increasing in psoas muscle hematoma. 3. Chest x-ray was done on 04/20/2017 showed hyperinflation with  increasing bronchovascular margin. 4. CT scan of the L-spine with contrast done on 04/20/2017 showed  markedly enlarged right psoas muscle due to hematoma. 5. CT scan of the T-spine with contrast was done, did not show any  epidural abscess or epidural hematoma in the thoracic spine canal.  6. CT-guided drain was placed by Interventional Radiologist.  7. Ultrasound of abdomen was done on 04/24/2017, did not show any  focal fluid collection or abscess. 8. PICC line was placed on 04/26/2017.  9. Repeat CT scan of abdomen and pelvis was done on 04/26/2017,  showed right psoas muscle is smaller after percutaneous drain  placement was placed, residual fluid collection still present, increasing bibasilar  atelectasis, new presacral edema present.   10. Renal arterial and venous Doppler was done on 05/03/2017, did  not show any significant bilateral renal artery stenosis. 11. CT abdomen and pelvis was repeated again on 05/04/2017,  showed increasing focal area within the right psoas muscle, inferior to  the previously drained area. 12. Chest x-ray was done on 05/04/2017, showed PICC line is in situ,  stable. Hazy appearance in the left lung base. 13. The patient had a new CT-guided right psoas muscle fluid  collection, drain was placed and the old right psoas muscle drain was  taken out on 05/10/2017. 14. Chest x-ray on 05/10/2017 was repeated, showed mild to moderate  cardiomegaly with pulmonary hypoventilation. 15. V/Q scan was done on 05/10/2017, which was reported low  probability for PE.  16. Bilateral lower extremity venous Doppler was done, showed  bilateral lower leg acute DVT. 17. X-ray of the left shoulder was done, showed mild arthritis. 18. IVC filter was placed. 19. KUB was done on 05/12/2017, showed IVC filter in situ and no  bowel obstruction. 20. Multiple chest x-rays were done. The last chest x-ray was done on  05/14/2017, showed diffuse right lung infiltrate with some effusion. 21. Ultrasound of the abdomen was done on 05/15/2017, showed  cholecystectomy. Liver is unremarkable. 22. Ultrasound of the chest was done, showed no significant right  pleural effusion for thoracentesis. 23. Echocardiogram was done on 05/15/2017, showed ejection fraction  of 50%. 24. Blood culture x2 done at the time of admission showed beta  hemolytic group B streptococci. 25. Urine culture x1 showed Enterococcus faecalis group D.  26. Surgical culture done at the time of admission also showed beta  hemolytic group B streptococcus. 27. Repeat blood culture on 04/23/2017 and 05/10/2017 were  negative; however, repeat blood culture on 05/14/2017 showed  Staphylococcus epidermis 4/4 bottles. Repeat blood culture on  05/15/2017 was reported negative.   28. Urine culture on 05/17/2017 negative. CONSULTANTS  1. Cardiology with Dr. Félix Leong. 2. Interventional Radiology with Dr. Jose Proctor and Dr. Matt Jewell. 3. Urology with Dr. Derick Seip and group. 4. Orthopedic Surgery with Dr. Megan Temple and group. 5. ID with Dr. Nikolay Cabrera. 6. Intensivist/Pulmonary with Dr. Paula Gipson and group. 7. Nephrology with Dr. Mercedes Stokes and group. 8. Urology with Dr. Olivia Mills and group. HOSPITAL COURSE: The patient was initially admitted to the hospital  from rehab on 04/20/2017 with a complaint of numbness in the right  lower extremity and inability to move both lower extremities. Please refer to hospital admission H and P done by Dr. Elayne Goodman for  further details. The patient had a CT scan of the L-spine and T-spine, which showed  increasing psoas abscess/hematoma. Dr. Megan Temple was involved. After  his evaluation, I decided to call Interventional Radiology for CT-guided  drainage, which was done. Dr. Nikolay Cabrera was also involved from ID  point of view, started on antibiotic and had a PICC line placed. Initial  blood culture was positive for beta hemolytic streptococcal and wound  culture was also positive for same bacteria; however, after initial  treatment the patient had a negative blood culture. The patient's hospital course was prolonged and complicated due to a  number of reasons. The patient had leg swelling and shortness of  breath. V/Q scan was done, which was negative for PE, but Doppler  was positive for DVT. Because of the patient having hematoma, it was  decided to call vascular surgeon, Dr. Jamie Jewell was involved and the  patient had IVC filter placed. The patient also developed atelectasis. Pulmonary was consulted. The patient was placed on pulmonary  hygiene with improvement in her symptoms. She requires 1 liter of  oxygen and saturating around 95%. At this time, the plan is to continue  oxygen for the next few days and then take her off oxygen.  Continue  incentive spirometry and nebulizer as the patient is at high risk for  developing atelectasis due to her bed bound status. The patient also  had a hospital-acquired skin tear, which was taken care of by wound  care nurses. The patient also developed bilateral foot callus, which  was present at the time of admission, and Dr. John Heath saw this  patient requiring some bedside debridement and he will follow this  patient as an outpatient. The patient also had acute renal failure, which  was thought secondary to neurogenic bladder/urinary retention. She  had a catheter placement with improvement in her kidney function. Neurology saw this patient and recommended continuing catheter until  the patient becomes functional. She was also seen by a nephrologist  because of renal failure, but they signed off once renal function got  back to normal. Her last creatinine was 1.09 and BUN was 13 on  05/23/2017. She had off and on electrolyte issues, which were  replaced appropriately. Her last potassium is 4 and magnesium is 1.9. Her hemoglobin initially went down and required some blood  transfusion, but for the last few days of her hospital stay her  hemoglobin remained stable around 8.5. She will be continued on  ferrous sulfate and bowel protocol with MiraLAX and Negrita-Colace. Her  diabetes was controlled and she was continued on sliding scale, as  well as Lantus. Lantus was decreased from 15 to 5 units daily as the  patient's appetite is not that great. The patient was being followed by a  dietitian throughout the hospital course and she will be continued on  dietary supplements. Please also evaluate this patient for an appetite  stimulant like Remeron. The patient also had initially elevated cardiac  enzymes, seen by cardiologist, who did not recommend any further  intervention; however, she had off and on abdominal pain at  pacemaker site, but that is her usual and was managed with pain  medications.     For the last 2 days of hospital stay, she was doing much better. She  required only 1 L oxygen as I mentioned earlier. She does not require  that much pain medication. No more abdominal pain. She has been  having regular bowel movements. She will be continued on Krueger  catheter. Her blood pressure and blood sugars remain stable on  current management. The plan is to do antibiotic until 06/14/2017 and  then remove PICC line afterwards. If the patient becomes  symptomatic, then she may need another CT scan of the T-spine and  L-spine and in that case the patient can be sent to the nearest  emergency room; otherwise, the patient will be followed by Dr. Teo Friedman. Please also do Accu-Chek 4 times a day. DISCHARGE INSTRUCTIONS  1. Diet: ADA and cardiac diet. 2. Dietitian to follow this patient and evaluate this patient for the need  for Remeron. 3. Activity: Fall precautions. PT, OT to follow. 4. Incentive spirometry every 2 hours while the patient is awake. 5. Continue oxygen 1 liter at this time for the next 5 days, then take her  off oxygen and make sure her oxygen saturation is 91% on room air. 6. Nursing home doctor to follow this patient. 7. Follow with Dr. Conchita Mijares in 2-3 weeks. 8. Follow with Dr. Teo Friedman in 3-4 weeks. 9. Remove PICC line after antibiotic is over. 8. Follow with Dr. Pipo Ricci or her own cardiologist in 6-8 weeks. 11. Follow with Dr. Sandra Cervantes, urologist, in 4 weeks. 12. Change Krueger catheter every month until she becomes functional.  13. Routine PICC line care. TRANSFER TIME: Total time greater than 35 minutes.         MD HILLARY Luong / Abbi  D:  05/25/2017   14:15  T:  05/25/2017   15:38  Job #:  [id number]

## 2017-05-25 NOTE — PROGRESS NOTES
Infectious Disease Progress Note    Requested by: Dr. Tracey Coleman, dr. Adrianna Fields    Reason: sepsis, acute paraplegia    Current abx Prior abx   Meropenem since 5/15  Fluconazole since 5/16/17   Cefepime 4/20-4/21  vancomycin  4/20-4/24  Gentamicin 4/21-4/24  Metronidazole  4/20-4/24  Ceftriaxone  4/24- 5/15  linezolid 5/14-5/17     Lines:   PICC RUE 4/26    Assessment :    77 y.o., right handed female, with an established history of hypertension, complete heart block with permanent pacemaker placed, diabetes mellitus, diabetic peripheral neuropathy, obesity, admitted to [facility] on 4/20/17. Clinical presentation consistent with  Sepsis (POA)  secondary to group B streptococcus bloodstream infection (positive blood cultures 4/20, negative blood cultures 4/21). Most likely source of bloodstream infection is infected right psoas hematoma/abscess. 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 No signs of neurological recovery on today's exam     2 week h/o pain at the site of abdominal pacemaker, tenderness at the site - however, patient doesn't have erythema at the site of pacemaker. No fluid collection noted at pacemaker pocket site per usg 4/24. Quick clearance of bacteremia would argue against endocarditis/endovascular infection. At this time risk of removal of pacemaker/general pocket change exceed the benefit. Discussed with cardiology. Would recommend close monitoring. Abdominal usg 4/24 doesn't reveal evidence of abscess/fluid collection at the site of pacemaker pocket. Enterococcus in urine cultures 4/20 likely colonizer    Acute renal failure: nephrology consult appreciated. Difficult to determine exact etiology of ARF at this time. Slight improvement in creatinine today.      Low grade fever overnight - Ct scan 5/4 reveals increasing focal area within the right psoas muscle inferior to the previously drained area. could represent either spread of infection inferiorly or intramuscular hematoma. S/p ct guided IR drain check - old drain removed. New drain placed inferiorly on 5/10    No evidence of pneumonia    High grade fevers with tm:102 on 5/10, tm:103 on 5/13 - likely due to aspiration pneumonia/hcap. two of two sets of blood cultures 5/14 positive for coagulase negative staphylococcus likely contaminant. Negative repeat blood cultures. unable to perform thoracentesis since inadequate fluid. Improved respiratory status/blood pressure after switch to meropenem    Acute renal failure: multifactorial - nephrology consult appreciated     bilateral LE dvt - s/p IVC filter placement 5/11    No leukocytosis     Resolved fevers. No evidence of abdominal wall cellulitis noted on ct scan. Clinically better    Recommendations:    1. cont fluconazole (d 8/14) to cover for possible candida cystitis. Continue ceftriaxone till 6/14/17  to complete treatment for infected psoas abscess  2. D/c picc line once antibiotic therapy completed  3. D/c planning per primary team      Above plan was discussed in details with patient. Please call me if any further questions or concerns. Will continue to participate in the care of this patient. subjective:    Feels better. Denies cp, sob. Unable to move legs. No nausea, vomiting. No new rash/itching/joint pain/back pain. Home Medication List    Details   gabapentin (NEURONTIN) 400 mg capsule Take 1 Cap by mouth two (2) times a day. Indications: NEUROPATHIC PAIN  Qty: 30 Cap, Refills: 0    Associated Diagnoses: Iliopsoas muscle hematoma, right, subsequent encounter      cholecalciferol (VITAMIN D3) 1,000 unit tablet Take 2 Tabs by mouth daily. Indications: PREVENTION OF VITAMIN D DEFICIENCY  Qty: 30 Tab, Refills: 0      SITagliptin (JANUVIA) 50 mg tablet Take 50 mg by mouth daily.  Indications: type 2 diabetes mellitus      potassium chloride (KLOR-CON M20) 20 mEq tablet Take 20 mEq by mouth two (2) times a day. Indications: HYPOKALEMIA PREVENTION      ondansetron (ZOFRAN ODT) 4 mg disintegrating tablet Take 4 mg by mouth every eight (8) hours as needed for Nausea. cyclobenzaprine (FLEXERIL) 10 mg tablet Take 10 mg by mouth as needed for Muscle Spasm(s). Indications: MUSCLE SPASM      carvedilol (COREG) 6.25 mg tablet Take 1 Tab by mouth two (2) times daily (with meals). Indications: hypertension  Qty: 30 Tab, Refills: 0    Associated Diagnoses: Benign hypertensive heart disease with systolic CHF, NYHA class 2 (HCC)      acetaminophen (TYLENOL) 325 mg tablet Take 2 Tabs by mouth every four (4) hours as needed (for fever or pain level less than 5/10). Indications: Fever, Pain  Qty: 30 Tab, Refills: 0    Associated Diagnoses: Iliopsoas muscle hematoma, right, subsequent encounter      allopurinol (ZYLOPRIM) 100 mg tablet Take 1 Tab by mouth daily. Indications: GOUT  Qty: 15 Tab, Refills: 0    Associated Diagnoses: Chronic gout, unspecified cause, unspecified site      insulin glargine (LANTUS) 100 unit/mL injection 15 Units by SubCUTAneous route nightly. Indications: type 2 diabetes mellitus  Qty: 1 Vial, Refills: 0    Associated Diagnoses: Type 2 diabetes mellitus with diabetic neuropathy, with long-term current use of insulin ([facility])      docusate sodium (COLACE) 100 mg capsule Take 1 Cap by mouth daily (after breakfast). Indications: Constipation  Qty: 15 Cap, Refills: 0      senna-docusate (PERICOLACE) 8.6-50 mg per tablet Take 2 Tabs by mouth daily (after dinner). Indications: Constipation  Qty: 30 Tab, Refills: 0      oxyCODONE-acetaminophen (PERCOCET 7.5) 7.5-325 mg per tablet Take 1 Tab by mouth every four (4) hours as needed (for pain level greater than 4/10). Max Daily Amount: 6 Tabs.  Indications: Pain  Qty: 50 Tab, Refills: 0    Associated Diagnoses: Iliopsoas muscle hematoma, right, subsequent encounter      bumetanide (BUMEX) 1 mg tablet TAKE 1 TABLET TWICE A DAY  Qty: 180 Tab, Refills: 1      pravastatin (PRAVACHOL) 40 mg tablet Take 40 mg by mouth nightly. Indications: DYSLIPIDEMIA      ferrous sulfate 325 mg (65 mg iron) tablet Take 1 Tab by mouth two (2) times daily (with meals).   Qty: 30 Tab, Refills: 0      insulin aspart (NOVOLOG) 100 unit/mL injection INITIATE INSULIN CORRECTIVE PROTOCOL (HR): Normal Insulin Sensitivity  For Blood Sugar (mg/dL) of:    Less than 150 =   0 units          150 -199 =   2 units 200 -249 =   4 units 250 -299 =   6 units 300 -349 =   8 units 350 and above =   10 units If 2 glucose readings are above 200 mg/dL  Qty: 10 mL, Refills: 6             Current Facility-Administered Medications   Medication Dose Route Frequency    alteplase (CATHFLO) 2 mg in sterile water (preservative free) 2 mL injection  2 mg InterCATHeter PRN    insulin glargine (LANTUS) injection 5 Units  5 Units SubCUTAneous DAILY    fluconazole (DIFLUCAN) tablet 200 mg  200 mg Oral DAILY    cefTRIAXone (ROCEPHIN) 2 g in 0.9% sodium chloride (MBP/ADV) 50 mL MBP  2 g IntraVENous Q24H    albuterol-ipratropium (DUO-NEB) 2.5 MG-0.5 MG/3 ML  3 mL Nebulization Q6HWA RT    famotidine (PEPCID) tablet 20 mg  20 mg Oral BID    insulin lispro (HUMALOG) injection   SubCUTAneous AC&HS    0.9% sodium chloride infusion 250 mL  250 mL IntraVENous PRN    midodrine (PROAMITINE) tablet 10 mg  10 mg Oral Q8H    acetaminophen (TYLENOL) suppository 650 mg  650 mg Rectal Q6H PRN    diclofenac (VOLTAREN) 1 % topical gel 2 g  2 g Topical Q8H PRN    allopurinol (ZYLOPRIM) tablet 50 mg  50 mg Oral DAILY    aluminum-magnesium hydroxide (MAALOX) oral suspension 15 mL  15 mL Oral QID PRN    Lactobacillus Acidoph & Bulgar (FLORANEX) tablet 1 Tab  1 Tab Oral BID    sodium chloride (NS) flush 5-10 mL  5-10 mL IntraVENous Q8H    naloxone (NARCAN) injection 0.1 mg  0.1 mg IntraVENous Multiple    acetaminophen (TYLENOL) tablet 650 mg  650 mg Oral Q4H PRN    0.9% sodium chloride infusion 250 mL  250 mL IntraVENous PRN    diphenhydrAMINE (BENADRYL) capsule 50 mg  50 mg Oral Q4H PRN    polyethylene glycol (MIRALAX) packet 17 g  17 g Oral TID    cholecalciferol (VITAMIN D3) tablet 2,000 Units  2,000 Units Oral DAILY    docusate sodium (COLACE) capsule 100 mg  100 mg Oral DAILY AFTER BREAKFAST    oxyCODONE-acetaminophen (PERCOCET 7.5) 7.5-325 mg per tablet 1 Tab  1 Tab Oral Q4H PRN    pravastatin (PRAVACHOL) tablet 40 mg  40 mg Oral QHS    senna-docusate (PERICOLACE) 8.6-50 mg per tablet 2 Tab  2 Tab Oral PCD    folic acid (FOLVITE) tablet 1 mg  1 mg Oral DAILY    ondansetron (ZOFRAN) injection 4 mg  4 mg IntraVENous Q8H PRN    glucose chewable tablet 16 g  16 g Oral PRN    glucagon (GLUCAGEN) injection 1 mg  1 mg IntraMUSCular PRN    dextrose (D50W) injection syrg 12.5-25 g  25-50 mL IntraVENous PRN       Allergies: Vancomycin; Ampicillin; Bactrim [sulfamethoxazole-trimethoprim]; Blueberry; Ciprofloxacin; Codeine; Crestor [rosuvastatin]; Darvocet a500 [propoxyphene n-acetaminophen]; Demerol [meperidine]; Levaquin [levofloxacin]; Lipitor [atorvastatin]; Magnesium oxide; Minocin [minocycline]; Pcn [penicillins]; Pravachol [pravastatin]; Sulfa (sulfonamide antibiotics); Ultracet [tramadol-acetaminophen]; Vicodin [hydrocodone-acetaminophen]; Vytorin 10-10 [ezetimibe-simvastatin]; and Percodan [oxycodone hcl-oxycodone-asa]    Temp (24hrs), Av °F (36.7 °C), Min:97.3 °F (36.3 °C), Max:98.9 °F (37.2 °C)    Visit Vitals    /79 (BP 1 Location: Left arm, BP Patient Position: At rest)    Pulse 76    Temp 98 °F (36.7 °C)    Resp 18    Ht 5' 7\" (1.702 m)    Wt 110.9 kg (244 lb 6.4 oz)    SpO2 95%    Breastfeeding No    BMI 38.28 kg/m2       ROS: patient unable to communicate fluently. Detailed ros not feasible.     Physical Exam:    General: Well developed, well nourished female laying on the bed, AAOx3 NAD    General:   awake alert and oriented   HEENT: Normocephalic, atraumatic, PERRL, EOMI, no scleral icterus or pallor; no conjunctival hemmohage;  nasal and oral mucous are moist and without evidence of lesions. Neck supple, no bruits. Lymph Nodes:   no cervical, axillary or inguinal adenopathy   Lungs:   non-labored, bilaterally clear to auscultation- no crackles wheezes rales or rhonchi   Heart:   s1 and s2 irregular; no rubs or gallops, no edema, + pedal pulses   Abdomen:  soft, non-distended, active bowel sounds, no hepatomegaly, no splenomegaly. no tenderness over the left abdominal pacemaker, no overlying erythema or fluctuance, drain right lower abdomen with serous drainage, mild linnette umbilical tenderness   Genitourinary:  deferred   Extremities:   no clubbing, cyanosis; no joint effusions or swelling; muscle mass appropriate for age   Neurologic:  No gross focal sensory abnormalities; 5/5 muscle strength to upper extremities. 0/5 strength in lower extremities.   Cranial nerves intact                        Skin:  Surgical scars abdomen, left neck well healed   Back:   no paraspinal muscle guarding or rigidity, no right CVA tenderness     Psychiatric:  No suicidal or homicidal ideations, appropriate mood and affect         Labs: Results:   Chemistry Recent Labs      05/25/17   0309 05/23/17 2056 05/23/17   0528   GLU   --    --   77   NA   --    --   138   K  4.0  3.5  3.5   CL   --    --   100   CO2   --    --   30   BUN   --    --   13   CREA   --    --   1.09   CA   --    --   8.5   AGAP   --    --   8   BUCR   --    --   12   AP   --    --   157*   TP   --    --   6.0*   ALB   --    --   2.0*   GLOB   --    --   4.0   AGRAT   --    --   0.5*      CBC w/Diff Recent Labs      05/25/17   0309 05/24/17   0500  05/23/17   0528   WBC  12.3  13.3*  13.5*   RBC   --   3.30*  3.28*   HGB   --   8.5*  8.5*   HCT   --   26.4*  26.2*   PLT   --   219  193   GRANS   --   75*  70   LYMPH   --   16*  19*   EOS   --   1  0      Microbiology No results for input(s): CULT in the last 72 hours.        RADIOLOGY:    All available imaging studies/reports in [facility] for this admission were reviewed    Dr. Valentina Murillo, Infectious Disease Specialist  [phone]  May 25, 2017  3:49 PM

## 2017-05-25 NOTE — PROGRESS NOTES
Consult    Patient: Junior Durant MRN: [id number]  SSN: [id number]    YOB: 1950  Age: 77 y.o. Sex: female      Subjective:      Junior Durant is a 77 y.o. female who is being seen for seen at bedside awake and alert. Needs at bedside debridement of foot ulcers. .    Past Medical History:   Diagnosis Date    [facility]leg[facility] ([facility] 4/20/2017    Benign hypertensive heart disease with systolic CHF, NYHA class 2 ([facility] 9/5/2012    Biventricular implantable cardioverter-defibrillator in situ 04/28/2005    Upgraded to BiV AICD; gen change 4/2008; pocket revision 10/2009; Abdominal - done on 8/22/2012 by Dr. Micha Jacobson Cardiac cath 08/15/1996    Patent coronaries. Elev LVEDP. EF 50-55%.  Cardiac echocardiogram 06/23/2015    Ltd study. EF 45-50%. Mild, diffuse hypk. Severe apical hypk. No mass or thrombus was clearly identified, although imaging was suboptimal.      Cardiac nuclear imaging test 06/19/2015    Fixed distal apical, distal septal defect more likely due to RV pacing than prior infarct. No ischemia. EF 46%. RWMA c/w RV pacing. Nondiagnostic EKG on pharm stress test.      Cardiovascular lower extremity venous duplex 09/04/2012    Acute, non-occlusive DVT in CFV on right. No DVT on left. No superficial thrombosis bilaterally.  Cardiovascular upper extremity venous duplex 08/27/2012    DVT in axillary vein on left. Left subclavian was not visualized.     Chronic anemia 9/5/2012    Chronic systolic heart failure (HCC)     Decreased calculated glomerular filtration rate (GFR) 3/30/2017    Calculated GFR equivalent to that of CKD stage 3 = 30-59 ml/min    Diabetic neuropathy associated with type 2 diabetes mellitus ([facility] 6/28/2011    Difficult airway for intubation 08/22/2012    see anesthesia airway note    Dyslipidemia 6/28/2011    Gout     History of complete heart block 6/28/2011    History of Coumadin therapy     Anticoagulation for DVT of the LUE; Discontinued on 3/30/2017    History of deep venous thrombosis 9/5/2012    Left upper extremity    History of pyelonephritis 3/30/2017    Left bundle branch block (LBBB) on electrocardiogram 6/28/2011    Nonischemic cardiomyopathy ([facility] 6/28/2011    Obesity (BMI 35.0-39.9 without comorbidity) ([facility] 3/13/2017    Obstructive sleep apnea on CPAP 2/7/2012    Psoas abscess, right ([facility] 4/20/2017    Psoas hematoma, right, secondary to anticoagulant therapy 3/30/2017    Type 2 diabetes mellitus with diabetic neuropathy ([facility] 6/28/2011     Past Surgical History:   Procedure Laterality Date    HX CARPAL TUNNEL RELEASE  4/07    right     HX CHOLECYSTECTOMY  1994    HX HYSTERECTOMY  1973    HX OTHER SURGICAL  6/11/2012    AICD revision    HX PACEMAKER  4/28/2005    [employer] AICD      Family History   Problem Relation Age of Onset    Cancer Father      Leukemia     Social History   Substance Use Topics    Smoking status: Never Smoker    Smokeless tobacco: Never Used    Alcohol use No      Current Facility-Administered Medications   Medication Dose Route Frequency Provider Last Rate Last Dose    alteplase (CATHFLO) 2 mg in sterile water (preservative free) 2 mL injection  2 mg InterCATHeter PRN Ari Romero MD        insulin glargine (LANTUS) injection 5 Units  5 Units SubCUTAneous DAILY Deborah Gant MD   5 Units at 05/25/17 0900    fluconazole (DIFLUCAN) tablet 200 mg  200 mg Oral DAILY Bhupinder King MD   200 mg at 05/25/17 0820    cefTRIAXone (ROCEPHIN) 2 g in 0.9% sodium chloride (MBP/ADV) 50 mL MBP  2 g IntraVENous Q24H Bhupinder King  mL/hr at 05/25/17 1137 2 g at 05/25/17 1137    albuterol-ipratropium (DUO-NEB) 2.5 MG-0.5 MG/3 ML  3 mL Nebulization Q6HWA RT Delfino Rajan PA-C   3 mL at 05/25/17 1414    famotidine (PEPCID) tablet 20 mg  20 mg Oral BID Teddy Meade MD   20 mg at 05/25/17 2820    insulin lispro (HUMALOG) injection   SubCUTAneous AC&HS Mello Ridley MD   Stopped at 05/21/17 1640    0.9% sodium chloride infusion 250 mL  250 mL IntraVENous PRN Va Sweet NP        midodrine (PROAMITINE) tablet 10 mg  10 mg Oral Q8H Luigi Rainey MD   10 mg at 05/25/17 6668    acetaminophen (TYLENOL) suppository 650 mg  650 mg Rectal Q6H PRN Shaquille Brown III, MD   650 mg at 05/14/17 0153    diclofenac (VOLTAREN) 1 % topical gel 2 g  2 g Topical Q8H PRN Suze Stroud MD        allopurinol (ZYLOPRIM) tablet 50 mg  50 mg Oral DAILY Rachel Goldstein MD   50 mg at 05/25/17 3104    aluminum-magnesium hydroxide (MAALOX) oral suspension 15 mL  15 mL Oral QID PRN Suze Stroud MD   15 mL at 05/16/17 0536    Lactobacillus Acidoph & Bulgar [facility]) tablet 1 Tab  1 Tab Oral BID Luisa Bass MD   1 Tab at 05/25/17 0826    sodium chloride (NS) flush 5-10 mL  5-10 mL IntraVENous Sumanth Roche MD   10 mL at 05/25/17 0535    naloxone (NARCAN) injection 0.1 mg  0.1 mg IntraVENous Multiple Venus Lopez MD        acetaminophen (TYLENOL) tablet 650 mg  650 mg Oral Q4H PRN Darwin Stroud MD   650 mg at 05/25/17 0819    0.9% sodium chloride infusion 250 mL  250 mL IntraVENous PRN Analilia Guajardo MD        diphenhydrAMINE (BENADRYL) capsule 50 mg  50 mg Oral Q4H PRN Darwin Stroud MD   50 mg at 05/15/17 0000    polyethylene glycol (MIRALAX) packet 17 g  17 g Oral TID Karen Carbajal MD   17 g at 05/25/17 2062    cholecalciferol (VITAMIN D3) tablet 2,000 Units  2,000 Units Oral DAILY Lucrecia Dorantes MD   2,000 Units at 05/25/17 0820    docusate sodium (COLACE) capsule 100 mg  100 mg Oral DAILY AFTER BREAKFAST Lucrecia Dorantes MD   100 mg at 05/25/17 0821    oxyCODONE-acetaminophen (PERCOCET 7.5) 7.5-325 mg per tablet 1 Tab  1 Tab Oral Q4H PRN Lucrecia Dorantes MD   1 Tab at 05/16/17 0526    pravastatin (PRAVACHOL) tablet 40 mg  40 mg Oral QHS Lucrecia Dorantes MD   40 mg at 05/24/17 2202    senna-docusate (PERICOLACE) 8.6-50 mg per tablet 2 Tab  2 Tab Oral PCD Caridad Francisco MD   2 Tab at 56/46/55 2090    folic acid (FOLVITE) tablet 1 mg  1 mg Oral DAILY Caridad Francisco MD   1 mg at 05/25/17 0820    ondansetron [facility]) injection 4 mg  4 mg IntraVENous Q8H PRN Caridad Francisco MD   4 mg at 05/24/17 1755    glucose chewable tablet 16 g  16 g Oral PRN Caridad Francisco MD        glucagon (GLUCAGEN) injection 1 mg  1 mg IntraMUSCular PRN Caridad Francisco MD        dextrose (D50W) injection syrg 12.5-25 g  25-50 mL IntraVENous PRN Caridad Francisco MD            Allergies   Allergen Reactions    Vancomycin Itching    Ampicillin Itching    Bactrim [Sulfamethoxazole-Trimethoprim] Unknown (comments)    Blueberry Swelling     Causes throat swelling    Ciprofloxacin Itching    Codeine Other (comments)     Jumpy feeling    Crestor [Rosuvastatin] Itching    Darvocet A500 [Propoxyphene N-Acetaminophen] Itching    Demerol [Meperidine] Itching    Levaquin [Levofloxacin] Itching    Lipitor [Atorvastatin] Myalgia    Magnesium Oxide Itching     nausea    Minocin [Minocycline] Unknown (comments)    Pcn [Penicillins] Itching    Pravachol [Pravastatin] Swelling     Swelling in mouth     Sulfa (Sulfonamide Antibiotics) Itching    Ultracet [Tramadol-Acetaminophen] Itching    Vicodin [Hydrocodone-Acetaminophen] Unknown (comments)    Vytorin 10-10 [Ezetimibe-Simvastatin] Myalgia    Percodan [Oxycodone Hcl-Oxycodone-Asa] Itching       Review of Systems:  A comprehensive review of systems was negative except for that written in the History of Present Illness. Objective:     Vitals:    05/25/17 0759 05/25/17 0800 05/25/17 1112 05/25/17 1415   BP:  148/80 133/79    Pulse:  (!) 59 76    Resp:  20 18    Temp:  98 °F (36.7 °C) 98 °F (36.7 °C)    TempSrc:       SpO2:  97% 95% 96%   Weight: 110.9 kg (244 lb 6.4 oz)      Height:            Physical Exam:  Neurovascular intact both feet. Discolored callused area under first metatarsal both feet. 3x2 cm's. No drainage.    With debridement there is penetration to subcutaneous tissue. No signs deeper penetration or infection. Assessment:     Hospital Problems  Date Reviewed: 5/24/2017          Codes Class Noted POA    * (Principal)Acute paraplegia ([facility] ICD-10-CM: G82.20  ICD-9-CM: 344.1  4/20/2017 Yes        Sepsis ([facility] ICD-10-CM: A41.9  ICD-9-CM: 038.9, 995.91  4/20/2017 Yes        Psoas abscess, right ([facility] ICD-10-CM: L18.06  ICD-9-CM: 567.31  4/20/2017 Yes        Krueger catheter in place on admission ICD-10-CM: Z96.0  ICD-9-CM: V45.89  4/20/2017 Yes        Urinary tract infection due to Enterococcus ICD-10-CM: N39.0, B95.2  ICD-9-CM: 599.0, 041.04  4/20/2017 Yes        Group B streptococcal infection ICD-10-CM: A49.1  ICD-9-CM: 041.02  4/20/2017 Yes        History of Coumadin therapy ICD-10-CM: Z79.01  ICD-9-CM: V58.61  Unknown Yes    Overview Signed 4/6/2017 10:35 PM by Miah Webb MD     Anticoagulation for chronic atrial fibrillation; Discontinued on 3/30/2017             Decreased calculated glomerular filtration rate (GFR) ICD-10-CM: R94.4  ICD-9-CM: 794.4  3/30/2017 Yes    Overview Signed 4/6/2017  5:47 PM by Miah Webb MD     Calculated GFR equivalent to that of CKD stage 3 = 30-59 ml/min             Iliopsoas muscle hematoma ICD-10-CM: S70.10XA  ICD-9-CM: 924.00  3/30/2017 Yes        Psoas hematoma, right, secondary to anticoagulant therapy ICD-10-CM: S30. 1XXA  ICD-9-CM: 924.9, E934.2  3/30/2017 Yes        Impaired mobility and ADLs ICD-10-CM: Z74.09  ICD-9-CM: 799.89  3/30/2017 Yes        Benign hypertensive heart disease with systolic CHF, NYHA class 2 (HCC) (Chronic) ICD-10-CM: I11.0, I50.20  ICD-9-CM: 402.11, 428.20, 428.0  9/5/2012 Yes        [facility] generator infection ([facility] ICD-10-CM: T82. 7XXA  ICD-9-CM: 996.61  8/20/2012 Yes    Overview Signed 8/20/2012  6:13 PM by Hanny Dasilva MD     S/p explant 4 leads 8/20/12             Type 2 diabetes mellitus with diabetic neuropathy (HCC) (Chronic) ICD-10-CM: E11.40  ICD-9-CM: 250.60, 357.2  6/28/2011 Yes              Plan:     Debridement neuropathic ulcers both feet.   Mepiplex dressing    Signed By: Jemma Castaneda DPM     May 25, 2017

## 2017-05-25 NOTE — DISCHARGE INSTRUCTIONS
DISCHARGE SUMMARY from Nurse    The following personal items are in your possession at time of discharge:    Dental Appliances: None  Visual Aid: None     Home Medications: None  Jewelry: None  Clothing: None  Other Valuables: Cell Phone             PATIENT INSTRUCTIONS:    After general anesthesia or intravenous sedation, for 24 hours or while taking prescription Narcotics:  · Limit your activities  · Do not drive and operate hazardous machinery  · Do not make important personal or business decisions  · Do  not drink alcoholic beverages  · If you have not urinated within 8 hours after discharge, please contact your surgeon on call. Report the following to your surgeon:  · Excessive pain, swelling, redness or odor of or around the surgical area  · Temperature over 100.5  · Nausea and vomiting lasting longer than 4 hours or if unable to take medications  · Any signs of decreased circulation or nerve impairment to extremity: change in color, persistent  numbness, tingling, coldness or increase pain  · Any questions      *  Please give a list of your current medications to your Primary Care Provider. *  Please update this list whenever your medications are discontinued, doses are      changed, or new medications (including over-the-counter products) are added. *  Please carry medication information at all times in case of emergency situations. These are general instructions for a healthy lifestyle:    No smoking/ No tobacco products/ Avoid exposure to second hand smoke    Surgeon General's Warning:  Quitting smoking now greatly reduces serious risk to your health.     Obesity, smoking, and sedentary lifestyle greatly increases your risk for illness    A healthy diet, regular physical exercise & weight monitoring are important for maintaining a healthy lifestyle    You may be retaining fluid if you have a history of heart failure or if you experience any of the following symptoms:  Weight gain of 3 pounds or more overnight or 5 pounds in a week, increased swelling in our hands or feet or shortness of breath while lying flat in bed. Please call your doctor as soon as you notice any of these symptoms; do not wait until your next office visit. Recognize signs and symptoms of STROKE:    F-face looks uneven    A-arms unable to move or move unevenly    S-speech slurred or non-existent    T-time-call 911 as soon as signs and symptoms begin-DO NOT go       Back to bed or wait to see if you get better-TIME IS BRAIN. Warning Signs of HEART ATTACK     Call 911 if you have these symptoms:   Chest discomfort. Most heart attacks involve discomfort in the center of the chest that lasts more than a few minutes, or that goes away and comes back. It can feel like uncomfortable pressure, squeezing, fullness, or pain.  Discomfort in other areas of the upper body. Symptoms can include pain or discomfort in one or both arms, the back, neck, jaw, or stomach.  Shortness of breath with or without chest discomfort.  Other signs may include breaking out in a cold sweat, nausea, or lightheadedness. Don't wait more than five minutes to call 911 - MINUTES MATTER! Fast action can save your life. Calling 911 is almost always the fastest way to get lifesaving treatment. Emergency Medical Services staff can begin treatment when they arrive -- up to an hour sooner than if someone gets to the hospital by car.

## 2017-05-25 NOTE — DISCHARGE SUMMARY
PATIENT DISCHARGE INSTRUCTIONS      PATIENT DISCHARGE INSTRUCTIONS    Patricia Hicks / [id number] : 1950    Admitted 2017 Discharged: 2017     Dictated # [id number]    · It is important that you take the medication exactly as they are prescribed. · Keep your medication in the bottles provided by the pharmacist and keep a list of the medication names, dosages, and times to be taken in your wallet. · Do not take other medications without consulting your doctor. What to do at Home    Recommended Diet: Cardiac Diet and Diabetic Diet    Recommended Activity: PT/OT Eval and Treat    Current Discharge Medication List      START taking these medications    Details   albuterol-ipratropium (DUO-NEB) 2.5 mg-0.5 mg/3 ml nebu 3 mL by Nebulization route four (4) times daily. Qty: 30 Nebule, Refills: 0      cefTRIAXone 2 gram 2 g IVPB 2 g by IntraVENous route every twenty-four (24) hours for 20 days. Qty: 20 Dose, Refills: 0      midodrine (PROAMITINE) 2.5 mg tablet 5 mg [2 tablets] po three time a day for 5 days then 2.5 mg po three time a day x 5 days then stop  Qty: 45 Tab, Refills: 0      fluconazole (DIFLUCAN) 200 mg tablet Take 1 Tab by mouth daily for 6 days. FDA advises cautious prescribing of oral fluconazole in pregnancy. Qty: 6 Tab, Refills: 0      Lactobacillus Acidoph & Bulgar (FLORANEX) 1 million cell tab tablet Take 1 Tab by mouth two (2) times a day for 21 days. Qty: 42 Tab, Refills: 0      aluminum-magnesium hydroxide (MAALOX) 200-200 mg/5 mL suspension Take 15 mL by mouth four (4) times daily as needed for Indigestion. Qty: 100 mL, Refills: 0      folic acid (FOLVITE) 1 mg tablet Take 1 Tab by mouth daily. Qty: 30 Tab, Refills: 0      famotidine (PEPCID) 20 mg tablet Take 1 Tab by mouth nightly. Qty: 30 Tab, Refills: 0      polyethylene glycol (MIRALAX) 17 gram packet Take 1 Packet by mouth two (2) times a day.   Qty: 30 Packet, Refills: 0         CONTINUE these medications which have CHANGED    Details   oxyCODONE-acetaminophen (PERCOCET 7.5) 7.5-325 mg per tablet Take 1 Tab by mouth every six (6) hours as needed (for pain level greater than 5/10). Max Daily Amount: 4 Tabs. Indications: Pain  Qty: 20 Tab, Refills: 0      allopurinol (ZYLOPRIM) 100 mg tablet Take 0.5 Tabs by mouth daily. Indications: HYPERURICEMIA  Qty: 30 Tab, Refills: 0      insulin glargine (LANTUS) 100 unit/mL injection 5 Units by SubCUTAneous route nightly. Indications: type 2 diabetes mellitus  Qty: 1 Vial, Refills: 0    Associated Diagnoses: Type 2 diabetes mellitus with diabetic neuropathy, with long-term current use of insulin (HCC)      ferrous sulfate 325 mg (65 mg iron) tablet Take 1 Tab by mouth two (2) times daily (with meals) for 14 days. Qty: 28 Tab, Refills: 0         CONTINUE these medications which have NOT CHANGED    Details   cholecalciferol (VITAMIN D3) 1,000 unit tablet Take 2 Tabs by mouth daily. Indications: PREVENTION OF VITAMIN D DEFICIENCY  Qty: 30 Tab, Refills: 0      ondansetron (ZOFRAN ODT) 4 mg disintegrating tablet Take 4 mg by mouth every eight (8) hours as needed for Nausea. acetaminophen (TYLENOL) 325 mg tablet Take 2 Tabs by mouth every four (4) hours as needed (for fever or pain level less than 5/10). Indications: Fever, Pain  Qty: 30 Tab, Refills: 0    Associated Diagnoses: Iliopsoas muscle hematoma, right, subsequent encounter      senna-docusate (PERICOLACE) 8.6-50 mg per tablet Take 2 Tabs by mouth daily (after dinner). Indications: Constipation  Qty: 30 Tab, Refills: 0      pravastatin (PRAVACHOL) 40 mg tablet Take 40 mg by mouth nightly.  Indications: DYSLIPIDEMIA      insulin aspart (NOVOLOG) 100 unit/mL injection INITIATE INSULIN CORRECTIVE PROTOCOL (HR): Normal Insulin Sensitivity  For Blood Sugar (mg/dL) of:    Less than 150 =   0 units          150 -199 =   2 units 200 -249 =   4 units 250 -299 =   6 units 300 -349 =   8 units 350 and above =   10 units If 2 glucose readings are above 200 mg/dL  Qty: 10 mL, Refills: 6         STOP taking these medications       gabapentin (NEURONTIN) 400 mg capsule Comments:   Reason for Stopping:         SITagliptin (JANUVIA) 50 mg tablet Comments:   Reason for Stopping:         potassium chloride (KLOR-CON M20) 20 mEq tablet Comments:   Reason for Stopping:         cyclobenzaprine (FLEXERIL) 10 mg tablet Comments:   Reason for Stopping:         carvedilol (COREG) 6.25 mg tablet Comments:   Reason for Stopping:         docusate sodium (COLACE) 100 mg capsule Comments:   Reason for Stopping:         bumetanide (BUMEX) 1 mg tablet Comments:   Reason for Stopping:                 Signed By: Deborah Gant MD     May 25, 2017

## 2017-05-25 NOTE — WOUND CARE
Physical Exam   456: Patient seen by wound care to reassess bi-lateral plantar callous areas noted to 1rst MPJ. Callous areas present on admission. Patient states that MD will return today to [facility] them down\". Nursing notified of plan of care. Nursing to apply moisturizing lotion to area after procedure. Care turned over to nursing.   Juwan Valera, Wound Care Department

## 2017-05-25 NOTE — INTERDISCIPLINARY ROUNDS
IDR/SLIDR Summary          Patient: Gennaro Cheng MRN: [id number]    Age: 77 y.o. YOB: 1950 Room/Bed: [facility]/   Admit Diagnosis: Acute paraplegia  Acute paraplegia (HCC)  Principal Diagnosis: Acute paraplegia ([address]   Goals: increased mobility  Readmission: YES  Quality Measure: Not applicable  VTE Prophylaxis: Chemical  Influenza Vaccine screening completed? YES  Pneumococcal Vaccine screening completed? YES  Mobility needs: Yes   Nutrition plan:Yes  Consults:P.T, O.T. and Case Management    Financial concerns:No  Escalated to CM? YES  RRAT Score: 31   Interventions:  Testing due for pt today?  YES  LOS: 35 days Expected length of stay 20 days  Discharge plan: rehab   PCP: Puneet Piña DO  Transportation needs: Yes    Days before discharge:two or more days before discharge   Discharge disposition: Rehab    Signed:     Natali Kamara RN  5/25/2017  6:42 AM

## 2017-05-25 NOTE — PROGRESS NOTES
Problem: Mobility Impaired (Adult and Pediatric)  Goal: *Acute Goals and Plan of Care (Insert Text)  STGs to be addressed within 3 days:  1. Bed mobility: Supine to sit to supine MaxAx1 with HR for meals. 2. Activity tolerance: Tolerate EOB > 15 minutes for ADLs. 3. Transfers: SPT-->to chair max/mod A with LRAD for ADLs. 4. Pt demo fair seated balance in preparation for functional transfers. LTGs to be addressed within 7 days:  1. Transfers: SB-->to chair/wc max/mod A for ADLs. 2. Activity tolerance: Tolerated > 1 hr in chair for change of position. 3. Patient Education: Independent with HEP for home safety.          Patient discharged to rehab prior to PT arrival.

## 2017-05-25 NOTE — PROGRESS NOTES
SUBJECTIVE:     Patient is feeling better. No chest or abdominal pain. No foot pain. No headaches or dizziness.      OBJECTIVE:     Visit Vitals    /79 (BP 1 Location: Left arm, BP Patient Position: At rest)    Pulse 76    Temp 98 °F (36.7 °C)    Resp 18    Ht 5' 7\" (1.702 m)    Wt 110.9 kg (244 lb 6.4 oz)    SpO2 95%    Breastfeeding No    BMI 38.28 kg/m2       RS: Diminished in bases, no wheezes. CVS: RRR  GI: ND, BS +, NT   Extremities: TRACE  pedal edema  CNS: motor strength 0-1/5 in both LEs [L > R]. 5/5 un UEs. Normal sensation to touch in both LEs. No tremors. Skin:  Bilateral foot callus, Right greater than left. General: NAD, AWAKE, Follows commands     ASSESSMENT:     1. Sepsis secondary to infected right psoas hematoma/epidural abscess with neurological compromise resulting in paraplegia. S/p CT guided right new psoas muscle drainage catheter placement and s/p drain removal on 5/22/17. 2. Paraplegia since 4/20 likely due to presumed spinal cord infarct/septic thrombophlebitis. 3. H/o intermittent pain at the site of abdominal pacemaker, currently resolved  4. Heart block s/p AICD 2005 with upgrade to BiV later that year, removed however in 2012 due to trauma and infection. New biventricular pacemaker LUQ 9/2012 by Dr. 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 5. Elevated troponin likely demand ischemia in setting of sepsis. No ACS. 6. H/p transient nonischemic CMY with EF 45%  7. Acute kidney injury due to neurogenic bladder and Component of ATN. Resolved   8. Acute on chronic anemia s/p PRBCs  9. Diabetes mellitus. 10.Dyslipidemia. 6. H/o HTN. 12. LBBB. 13. HALI on CPAP. 14. Left shoulder pain  15. Atypical Chest pain   16. Hypoxia due to atelectasis  17. Bilateral LE DVT s/p IVC  18. Bibasilar atelectasis   19. Severe protein malnutrition   20. Bilateral foot callus, POA  21.  Wound Gluteal fold / cleft (Active) Skin tear HAC AND Lt buttock friction injury HAC     PLAN:     Cont antibiotic per ID  Cont current management. Cont perez until becomes functional  Podiatrist to follow  Talked to CM about discharge process - has payer source issue to go to SNF     --> TOTAL TIME GREATER THAN 30 MINUTES    CMP:   Lab Results   Component Value Date/Time    K 4.0 05/25/2017 03:09 AM    MG 1.9 05/25/2017 03:09 AM     CBC:   Lab Results   Component Value Date/Time    WBC 12.3 05/25/2017 03:09 AM       Current Discharge Medication List      START taking these medications    Details   albuterol-ipratropium (DUO-NEB) 2.5 mg-0.5 mg/3 ml nebu 3 mL by Nebulization route four (4) times daily. Qty: 30 Nebule, Refills: 0      cefTRIAXone 2 gram 2 g IVPB 2 g by IntraVENous route every twenty-four (24) hours for 20 days. Qty: 20 Dose, Refills: 0      midodrine (PROAMITINE) 2.5 mg tablet 5 mg [2 tablets] po three time a day for 5 days then 2.5 mg po three time a day x 5 days then stop  Qty: 45 Tab, Refills: 0      fluconazole (DIFLUCAN) 200 mg tablet Take 1 Tab by mouth daily for 6 days. FDA advises cautious prescribing of oral fluconazole in pregnancy. Qty: 6 Tab, Refills: 0      Lactobacillus Acidoph & Bulgar (FLORANEX) 1 million cell tab tablet Take 1 Tab by mouth two (2) times a day for 21 days. Qty: 42 Tab, Refills: 0      aluminum-magnesium hydroxide (MAALOX) 200-200 mg/5 mL suspension Take 15 mL by mouth four (4) times daily as needed for Indigestion. Qty: 100 mL, Refills: 0      folic acid (FOLVITE) 1 mg tablet Take 1 Tab by mouth daily. Qty: 30 Tab, Refills: 0      famotidine (PEPCID) 20 mg tablet Take 1 Tab by mouth nightly. Qty: 30 Tab, Refills: 0      polyethylene glycol (MIRALAX) 17 gram packet Take 1 Packet by mouth three (3) times daily.   Qty: 30 Packet, Refills: 0         CONTINUE these medications which have CHANGED    Details   oxyCODONE-acetaminophen (PERCOCET 7.5) 7.5-325 mg per tablet Take 1 Tab by mouth every six (6) hours as needed (for pain level greater than 5/10). Max Daily Amount: 4 Tabs. Indications: Pain  Qty: 20 Tab, Refills: 0      allopurinol (ZYLOPRIM) 100 mg tablet Take 0.5 Tabs by mouth daily. Indications: HYPERURICEMIA  Qty: 30 Tab, Refills: 0      insulin glargine (LANTUS) 100 unit/mL injection 5 Units by SubCUTAneous route nightly. Indications: type 2 diabetes mellitus  Qty: 1 Vial, Refills: 0    Associated Diagnoses: Type 2 diabetes mellitus with diabetic neuropathy, with long-term current use of insulin (HCC)      ferrous sulfate 325 mg (65 mg iron) tablet Take 1 Tab by mouth two (2) times daily (with meals) for 14 days. Qty: 28 Tab, Refills: 0         CONTINUE these medications which have NOT CHANGED    Details   cholecalciferol (VITAMIN D3) 1,000 unit tablet Take 2 Tabs by mouth daily. Indications: PREVENTION OF VITAMIN D DEFICIENCY  Qty: 30 Tab, Refills: 0      ondansetron (ZOFRAN ODT) 4 mg disintegrating tablet Take 4 mg by mouth every eight (8) hours as needed for Nausea. acetaminophen (TYLENOL) 325 mg tablet Take 2 Tabs by mouth every four (4) hours as needed (for fever or pain level less than 5/10). Indications: Fever, Pain  Qty: 30 Tab, Refills: 0    Associated Diagnoses: Iliopsoas muscle hematoma, right, subsequent encounter      docusate sodium (COLACE) 100 mg capsule Take 1 Cap by mouth daily (after breakfast). Indications: Constipation  Qty: 15 Cap, Refills: 0      senna-docusate (PERICOLACE) 8.6-50 mg per tablet Take 2 Tabs by mouth daily (after dinner). Indications: Constipation  Qty: 30 Tab, Refills: 0      pravastatin (PRAVACHOL) 40 mg tablet Take 40 mg by mouth nightly.  Indications: DYSLIPIDEMIA      insulin aspart (NOVOLOG) 100 unit/mL injection INITIATE INSULIN CORRECTIVE PROTOCOL (HR): Normal Insulin Sensitivity  For Blood Sugar (mg/dL) of:    Less than 150 =   0 units          150 -199 =   2 units 200 -249 =   4 units 250 -299 =   6 units 300 -349 =   8 units 350 and above =   10 units If 2 glucose readings are above 200 mg/dL  Qty: 10 mL, Refills: 6         STOP taking these medications       gabapentin (NEURONTIN) 400 mg capsule Comments:   Reason for Stopping:         SITagliptin (JANUVIA) 50 mg tablet Comments:   Reason for Stopping:         potassium chloride (KLOR-CON M20) 20 mEq tablet Comments:   Reason for Stopping:         cyclobenzaprine (FLEXERIL) 10 mg tablet Comments:   Reason for Stopping:         carvedilol (COREG) 6.25 mg tablet Comments:   Reason for Stopping:         bumetanide (BUMEX) 1 mg tablet Comments:   Reason for Stopping:

## 2017-05-25 NOTE — PROGRESS NOTES
Problem: Self Care Deficits Care Plan (Adult)  Goal: *Acute Goals and Plan of Care (Insert Text)  Occupational Therapy Goals  Initiated 5/23/2017 within 7 day(s). 1. Patient will perform grooming with supervision/set-up while sitting on the edge of the bed  2. Patient will perform lower body dressing with moderate assistance at bed level  3. Patient will perform scapular setting and alignment program in preparation for her independent sitting midline on the edge of the bed for her self care participation     Outcome: Progressing Towards Goal  OCCUPATIONAL THERAPY TREATMENT     Patient: Nu Bueno (17 y.o. female)  Date: 5/25/2017  Diagnosis: Acute paraplegia  Acute paraplegia ([facility] [address] Acute paraplegia ([facility] [address]       Precautions: Fall, Skin  Chart, occupational therapy assessment, plan of care, and goals were reviewed. ASSESSMENT:  Pt is sitting up in bed upon entry, agreeable to participate in EOB task. Pt participated in scapular strengthening/alignment activities in preparation for ADLs. Pt was educated on the importance of proper scapular alignment to ensure efficiency w/ADLs and functional mobility and increase balance in sitting. Upon maneuvering to EOB pt noted top have a BM, pt demonstrates improvement w/bed mobility skills, was able to roll for linnette-care w/Min A. Following linnette-care pt reports she is too tired to participate in further activities, and wishes to have a bath by CNA. Pt's nurse and CNA notified. Progression toward goals:  [ ]          Improving appropriately and progressing toward goals  [X]          Improving slowly and progressing toward goals  [ ]          Not making progress toward goals and plan of care will be adjusted       PLAN:  Patient continues to benefit from skilled intervention to address the above impairments. Continue treatment per established plan of care.   Discharge Recommendations:  Rehab  Further Equipment Recommendations for Discharge:  N/A      G-CODES:      Self Care  Current  CM= 80-99%. The severity rating is based on the Level of Assistance required for Functional Mobility and ADLs. SUBJECTIVE:   Patient stated I will try.       OBJECTIVE DATA SUMMARY:   Cognitive/Behavioral Status:  Neurologic State: Alert  Orientation Level: Oriented X4  Cognition: Follows commands  Safety/Judgement: Decreased insight into deficits, Decreased awareness of need for assistance     Functional Mobility and Transfers for ADLs:              Bed Mobility:  Rolling: Minimum assistance   Scooting: Contact guard assistance              ADL Intervention:   Grooming  Grooming Assistance: Modified independent (at bed level)  Washing Face: Modified independent  Washing Hands: Modified independent      Therapeutic Exercises:   Scapular strengthening and alignment program     Pain:  Pt reports 2/10 pain or discomfort prior to treatment. Headache  Pt reports 2/10 pain or discomfort post treatment. Activity Tolerance:    Fair     Please refer to the flowsheet for vital signs taken during this treatment.   After treatment:   [ ]  Patient left in no apparent distress sitting up in chair  [X]  Patient left in no apparent distress in bed  [X]  Call bell left within reach  [X]  Nursing notified  [ ]  Caregiver present  [ ]  Bed alarm activated     Nadeen Bending, CAGLE/L  Time Calculation: 23 mins

## 2017-05-25 NOTE — ROUTINE PROCESS
Bedside and Verbal shift change report given to Claudia Murphy RN (oncoming nurse) by Gabriel Nino (offgoing nurse). Report included the following information SBAR, Kardex, MAR and Recent Results. SITUATION:    Code Status: Full Code  Reason for Admission: Acute paraplegia   Acute paraplegia [facility] day: 28   Problem List:       Hospital Problems  Date Reviewed: 5/24/2017          Codes Class Noted POA    * (Principal)Acute paraplegia ([facility] [address] ICD-10-CM: G82.20  ICD-9-CM: 344.1  4/20/2017 Yes        Sepsis ([facility] [address] ICD-10-CM: A41.9  ICD-9-CM: 038.9, 995.91  4/20/2017 Yes        Psoas abscess, right ([facility] [address] ICD-10-CM: D83.20  ICD-9-CM: 567.31  4/20/2017 Yes        Krueger catheter in place on admission ICD-10-CM: Z96.0  ICD-9-CM: V45.89  4/20/2017 Yes        Urinary tract infection due to Enterococcus ICD-10-CM: N39.0, B95.2  ICD-9-CM: 599.0, 041.04  4/20/2017 Yes        Group B streptococcal infection ICD-10-CM: A49.1  ICD-9-CM: 041.02  4/20/2017 Yes        History of Coumadin therapy ICD-10-CM: Z79.01  ICD-9-CM: V58.61  Unknown Yes    Overview Signed 4/6/2017 10:35 PM by Miah Webb MD     Anticoagulation for chronic atrial fibrillation; Discontinued on 3/30/2017             Decreased calculated glomerular filtration rate (GFR) ICD-10-CM: R94.4  ICD-9-CM: 794.4  3/30/2017 Yes    Overview Signed 4/6/2017  5:47 PM by Miah Webb MD     Calculated GFR equivalent to that of CKD stage 3 = 30-59 ml/min             Iliopsoas muscle hematoma ICD-10-CM: S70.10XA  ICD-9-CM: 924.00  3/30/2017 Yes        Psoas hematoma, right, secondary to anticoagulant therapy ICD-10-CM: S30. 1XXA  ICD-9-CM: 924.9, E934.2  3/30/2017 Yes        Impaired mobility and ADLs ICD-10-CM: Z74.09  ICD-9-CM: 799.89  3/30/2017 Yes        Benign hypertensive heart disease with systolic CHF, NYHA class 2 (HCC) (Chronic) ICD-10-CM: I11.0, I50.20  ICD-9-CM: 402.11, 428.20, 428.0  9/5/2012 Yes        AICD generator infection ([facility] [address] ICD-10-CM: T82. 7XXA  ICD-9-CM: 996.61  8/20/2012 Yes    Overview Signed 8/20/2012  6:13 PM by James Hein MD     S/p explant 4 leads 8/20/12             Type 2 diabetes mellitus with diabetic neuropathy (HCC) (Chronic) ICD-10-CM: E11.40  ICD-9-CM: 250.60, 357.2  6/28/2011 Yes              BACKGROUND:    Past Medical History:   Past Medical History:   Diagnosis Date    Acute paraplegia ([facility] [address] 4/20/2017    Benign hypertensive heart disease with systolic CHF, NYHA class 2 ([facility] [address] 9/5/2012    Biventricular implantable cardioverter-defibrillator in situ 04/28/2005    Upgraded to BiV AICD; gen change 4/2008; pocket revision 10/2009; Abdominal - done on 8/22/2012 by Dr. Ashwin Lloyd Cardiac cath 08/15/1996    Patent coronaries. Elev LVEDP. EF 50-55%.  Cardiac echocardiogram 06/23/2015    Ltd study. EF 45-50%. Mild, diffuse hypk. Severe apical hypk. No mass or thrombus was clearly identified, although imaging was suboptimal.      Cardiac nuclear imaging test 06/19/2015    Fixed distal apical, distal septal defect more likely due to RV pacing than prior infarct. No ischemia. EF 46%. RWMA c/w RV pacing. Nondiagnostic EKG on pharm stress test.      Cardiovascular lower extremity venous duplex 09/04/2012    Acute, non-occlusive DVT in CFV on right. No DVT on left. No superficial thrombosis bilaterally.  Cardiovascular upper extremity venous duplex 08/27/2012    DVT in axillary vein on left. Left subclavian was not visualized.     Chronic anemia 9/5/2012    Chronic systolic heart failure (HCC)     Decreased calculated glomerular filtration rate (GFR) 3/30/2017    Calculated GFR equivalent to that of CKD stage 3 = 30-59 ml/min    Diabetic neuropathy associated with type 2 diabetes mellitus ([facility] [address] 6/28/2011    Difficult airway for intubation 08/22/2012    see anesthesia airway note    Dyslipidemia 6/28/2011    Gout     History of complete heart block 6/28/2011    History of Coumadin therapy Anticoagulation for DVT of the LUE; Discontinued on 3/30/2017    History of deep venous thrombosis 9/5/2012    Left upper extremity    History of pyelonephritis 3/30/2017    Left bundle branch block (LBBB) on electrocardiogram 6/28/2011    Nonischemic cardiomyopathy ([facility] [address] 6/28/2011    Obesity (BMI 35.0-39.9 without comorbidity) ([facility] [address] 3/13/2017    Obstructive sleep apnea on CPAP 2/7/2012    Psoas abscess, right ([facility] [address] 4/20/2017    Psoas hematoma, right, secondary to anticoagulant therapy 3/30/2017    Type 2 diabetes mellitus with diabetic neuropathy ([facility] [address] 6/28/2011         Patient taking anticoagulants no     ASSESSMENT:    Changes in Assessment Throughout Shift: no     Patient has Central Line: yes Reasons if yes: poor veins   Patient has Krueger Cath: yes Reasons if yes: retention      Last Vitals:     Vitals:    05/25/17 0400 05/25/17 0755 05/25/17 0759 05/25/17 0800   BP: 150/78   148/80   Pulse: 69   (!) 59   Resp: 18   20   Temp: 97.3 °F (36.3 °C)   98 °F (36.7 °C)   TempSrc:       SpO2: 99% 99%  97%   Weight:   110.9 kg (244 lb 6.4 oz)    Height:            IV and DRAINS (will only show if present)   [REMOVED] Drain 8.5 FR Right psoas muscle drain 05/10/17 Right Other (comment)-Site Assessment: Clean, dry, & intact  [REMOVED] Drain 04/20/17 Right Other (comment)-Site Assessment: Clean, dry, & intact  [REMOVED] Peripheral IV 04/20/17 Left Arm-Site Assessment: Clean, dry, & intact  [REMOVED] Peripheral IV 04/20/17 Right Antecubital-Site Assessment: Clean, dry, & intact  [REMOVED] Triple Lumen Triple Lumen CVL 04/20/17 Left Other(comment)-Site Assessment: Clean, dry, & intact  [REMOVED] Peripheral IV 04/21/17 Left Arm-Site Assessment: Clean, dry, & intact  [REMOVED] Peripheral IV 04/25/17 Left Forearm-Site Assessment: Clean, dry, & intact  PICC Double Lumen 25/63/19 Right;Basilic-Site Assessment: Clean, dry, & intact     WOUND (if present)   Wound Type:  none   Dressing present: yes sacrum   Wound Concerns/Notes:  none     PAIN    Pain Assessment    Pain Intensity 1: 0 (05/25/17 0627)    Pain Location 1: Head    Pain Intervention(s) 1: Medication (see MAR)    Patient Stated Pain Goal: 0  o Interventions for Pain:  tylenol  o Intervention effective: yes  o Time of last intervention: 0640  o Reassessment Completed: yes      Last 3 Weights:  Last 3 Recorded Weights in this Encounter    05/23/17 0722 05/24/17 0500 05/25/17 0759   Weight: 111.8 kg (246 lb 7.6 oz) 110.9 kg (244 lb 6.4 oz) 110.9 kg (244 lb 6.4 oz)     Weight change:      INTAKE/OUPUT    Current Shift: 05/25 0701 - 05/25 1900  In: 220 [P.O.:220]  Out: 400 [Urine:400]    Last three shifts: 05/23 1901 - 05/25 0700  In: -   Out: 850 [Urine:850]     LAB RESULTS     Recent Labs      05/25/17   0309 05/24/17   0500 05/23/17 0528   WBC  12.3  13.3*  13.5*   HGB   --   8.5*  8.5*   HCT   --   26.4*  26.2*   PLT   --   219  193        Recent Labs      05/25/17   0309 05/24/17   0500 05/23/17 2056 05/23/17   0528   NA   --    --    --   138   K  4.0   --   3.5  3.5   GLU   --    --    --   77   BUN   --    --    --   13   CREA   --    --    --   1.09   CA   --    --    --   8.5   MG  1.9  1.6   --    --        RECOMMENDATIONS AND DISCHARGE PLANNING     1. Pending tests/procedures/ Plan of Care or Other Needs: Labs, perez cath care    2. Discharge plan for patient and Needs/Barriers: Rehab    3. Estimated Discharge Date: 5/28/17 Posted on Whiteboard in Patients Room: no      4. The patient's care plan was reviewed with the oncoming nurse. \"HEALS\" SAFETY CHECK      Fall Risk    Total Score: 3    Safety Measures: Safety Measures: Bed/Chair alarm on, Bed/Chair-Wheels locked, Bed in low position, Call light within reach, Fall prevention (comment)    A safety check occurred in the patient's room between off going nurse and oncoming nurse listed above.     The safety check included the below items  Area Items   H  High Alert Medications - Verify all high alert medication drips (heparin, PCA, etc.)   E  Equipment - Suction is set up for ALL patients (with vania)  - Red plugs utilized for all equipment (IV pumps, etc.)  - WOWs wiped down at end of shift.  - Room stocked with oxygen, suction, and other unit-specific supplies   A  Alarms - Bed alarm is set for fall risk patients  - Ensure chair alarm is in place and activated if patient is up in a chair   L  Lines - Check IV for any infiltration  - Krueger bag is empty if patient has a Krueger   - Tubing and IV bags are labeled   S  Safety   - Room is clean, patient is clean, and equipment is clean. - Hallways are clear from equipment besides carts. - Fall bracelet on for fall risk patients  - Ensure room is clear and free of clutter  - Suction is set up for ALL patients (with vania)  - Hallways are clear from equipment besides carts.    - Isolation precautions followed, supplies available outside room, sign posted     Elizabeth Kinsey

## 2017-06-05 ENCOUNTER — TELEPHONE (OUTPATIENT)
Dept: ORTHOPEDIC SURGERY | Age: 67
End: 2017-06-05

## 2017-06-05 NOTE — TELEPHONE ENCOUNTER
Amari Zaidi from [facility] called in states that the NP there had some concerns about pt and wanted to follow up with Dr. Hubert Oquendo. Pt arrived at [facility] around 05/25-05/2617. Per Brianne Harden in  [facility] admissions on 04/20/17 pt was to f/u with Hubert Oquendo. Amari Zaidi stated that the NP wanted Hubert Oquendo to know that the pt is having tremors in both arms left being the worst, pt has low magnesium but is allergic to magnesium. NP wants to know if [facility] [address] Amari Zaidi can be reached at [phone].

## 2017-06-06 ENCOUNTER — HOSPITAL ENCOUNTER (INPATIENT)
Age: 67
LOS: 16 days | Discharge: NURSING, REHAB OR LONG-TERM CARE | DRG: 372 | End: 2017-06-22
Attending: EMERGENCY MEDICINE | Admitting: FAMILY MEDICINE
Payer: COMMERCIAL

## 2017-06-06 ENCOUNTER — APPOINTMENT (OUTPATIENT)
Dept: GENERAL RADIOLOGY | Age: 67
DRG: 372 | End: 2017-06-06
Attending: EMERGENCY MEDICINE
Payer: COMMERCIAL

## 2017-06-06 DIAGNOSIS — D64.9 ANEMIA, UNSPECIFIED TYPE: Primary | ICD-10-CM

## 2017-06-06 DIAGNOSIS — Z97.8 FOLEY CATHETER IN PLACE ON ADMISSION: ICD-10-CM

## 2017-06-06 DIAGNOSIS — R53.83 FATIGUE, UNSPECIFIED TYPE: ICD-10-CM

## 2017-06-06 DIAGNOSIS — R25.1 TREMOR: ICD-10-CM

## 2017-06-06 DIAGNOSIS — R60.9 EDEMA, UNSPECIFIED TYPE: ICD-10-CM

## 2017-06-06 PROBLEM — E87.70 HYPERVOLEMIA: Status: ACTIVE | Noted: 2017-06-06

## 2017-06-06 LAB
ALBUMIN SERPL BCP-MCNC: 1.7 G/DL (ref 3.4–5)
ALBUMIN/GLOB SERPL: 0.4 {RATIO} (ref 0.8–1.7)
ALP SERPL-CCNC: 206 U/L (ref 45–117)
ALT SERPL-CCNC: 31 U/L (ref 13–56)
ANION GAP BLD CALC-SCNC: 9 MMOL/L (ref 3–18)
APPEARANCE UR: ABNORMAL
APTT PPP: 42.6 SEC (ref 23–36.4)
AST SERPL W P-5'-P-CCNC: 57 U/L (ref 15–37)
BACTERIA URNS QL MICRO: ABNORMAL /HPF
BASOPHILS # BLD AUTO: 0 K/UL (ref 0–0.1)
BASOPHILS # BLD: 0 % (ref 0–2)
BILIRUB SERPL-MCNC: 0.3 MG/DL (ref 0.2–1)
BILIRUB UR QL: NEGATIVE
BNP SERPL-MCNC: ABNORMAL PG/ML (ref 0–900)
BUN SERPL-MCNC: 16 MG/DL (ref 7–18)
BUN/CREAT SERPL: 17 (ref 12–20)
CALCIUM SERPL-MCNC: 8.2 MG/DL (ref 8.5–10.1)
CHLORIDE SERPL-SCNC: 99 MMOL/L (ref 100–108)
CK MB CFR SERPL CALC: 3.3 % (ref 0–4)
CK MB SERPL-MCNC: 1.1 NG/ML (ref 5–25)
CK SERPL-CCNC: 33 U/L (ref 26–192)
CO2 SERPL-SCNC: 27 MMOL/L (ref 21–32)
COLOR UR: YELLOW
CREAT SERPL-MCNC: 0.93 MG/DL (ref 0.6–1.3)
DIFFERENTIAL METHOD BLD: ABNORMAL
EOSINOPHIL # BLD: 0.1 K/UL (ref 0–0.4)
EOSINOPHIL NFR BLD: 1 % (ref 0–5)
EPITH CASTS URNS QL MICRO: ABNORMAL /LPF (ref 0–5)
ERYTHROCYTE [DISTWIDTH] IN BLOOD BY AUTOMATED COUNT: 17.3 % (ref 11.6–14.5)
GLOBULIN SER CALC-MCNC: 4.6 G/DL (ref 2–4)
GLUCOSE SERPL-MCNC: 228 MG/DL (ref 74–99)
GLUCOSE UR STRIP.AUTO-MCNC: NEGATIVE MG/DL
HCT VFR BLD AUTO: 20.8 % (ref 35–45)
HGB BLD-MCNC: 6.9 G/DL (ref 12–16)
HGB UR QL STRIP: ABNORMAL
INR PPP: 1.2 (ref 0.8–1.2)
KETONES UR QL STRIP.AUTO: NEGATIVE MG/DL
LACTATE BLD-SCNC: 1.5 MMOL/L (ref 0.4–2)
LEUKOCYTE ESTERASE UR QL STRIP.AUTO: ABNORMAL
LYMPHOCYTES # BLD AUTO: 22 % (ref 21–52)
LYMPHOCYTES # BLD: 1.6 K/UL (ref 0.9–3.6)
MAGNESIUM SERPL-MCNC: 1.6 MG/DL (ref 1.6–2.6)
MCH RBC QN AUTO: 26.1 PG (ref 24–34)
MCHC RBC AUTO-ENTMCNC: 33.2 G/DL (ref 31–37)
MCV RBC AUTO: 78.8 FL (ref 74–97)
MONOCYTES # BLD: 0.6 K/UL (ref 0.05–1.2)
MONOCYTES NFR BLD AUTO: 9 % (ref 3–10)
NEUTS SEG # BLD: 4.9 K/UL (ref 1.8–8)
NEUTS SEG NFR BLD AUTO: 68 % (ref 40–73)
NITRITE UR QL STRIP.AUTO: NEGATIVE
PH UR STRIP: 6 [PH] (ref 5–8)
PLATELET # BLD AUTO: 235 K/UL (ref 135–420)
PMV BLD AUTO: 8.6 FL (ref 9.2–11.8)
POTASSIUM SERPL-SCNC: 3.2 MMOL/L (ref 3.5–5.5)
PROT SERPL-MCNC: 6.3 G/DL (ref 6.4–8.2)
PROT UR STRIP-MCNC: 30 MG/DL
PROTHROMBIN TIME: 15.1 SEC (ref 11.5–15.2)
RBC # BLD AUTO: 2.64 M/UL (ref 4.2–5.3)
RBC #/AREA URNS HPF: ABNORMAL /HPF (ref 0–5)
SODIUM SERPL-SCNC: 135 MMOL/L (ref 136–145)
SP GR UR REFRACTOMETRY: 1.02 (ref 1–1.03)
TROPONIN I SERPL-MCNC: <0.02 NG/ML (ref 0–0.04)
UROBILINOGEN UR QL STRIP.AUTO: 1 EU/DL (ref 0.2–1)
WBC # BLD AUTO: 7.2 K/UL (ref 4.6–13.2)
WBC URNS QL MICRO: ABNORMAL /HPF (ref 0–4)
YEAST URNS QL MICRO: ABNORMAL

## 2017-06-06 PROCEDURE — 83605 ASSAY OF LACTIC ACID: CPT

## 2017-06-06 PROCEDURE — 83880 ASSAY OF NATRIURETIC PEPTIDE: CPT | Performed by: EMERGENCY MEDICINE

## 2017-06-06 PROCEDURE — 83735 ASSAY OF MAGNESIUM: CPT | Performed by: EMERGENCY MEDICINE

## 2017-06-06 PROCEDURE — 80053 COMPREHEN METABOLIC PANEL: CPT | Performed by: EMERGENCY MEDICINE

## 2017-06-06 PROCEDURE — 74011250636 HC RX REV CODE- 250/636: Performed by: EMERGENCY MEDICINE

## 2017-06-06 PROCEDURE — 86920 COMPATIBILITY TEST SPIN: CPT | Performed by: EMERGENCY MEDICINE

## 2017-06-06 PROCEDURE — 77030013169 SET IV BLD ICUM -A

## 2017-06-06 PROCEDURE — 81001 URINALYSIS AUTO W/SCOPE: CPT | Performed by: EMERGENCY MEDICINE

## 2017-06-06 PROCEDURE — P9016 RBC LEUKOCYTES REDUCED: HCPCS | Performed by: EMERGENCY MEDICINE

## 2017-06-06 PROCEDURE — 71010 XR CHEST SNGL V: CPT

## 2017-06-06 PROCEDURE — 87040 BLOOD CULTURE FOR BACTERIA: CPT | Performed by: EMERGENCY MEDICINE

## 2017-06-06 PROCEDURE — 82550 ASSAY OF CK (CPK): CPT | Performed by: EMERGENCY MEDICINE

## 2017-06-06 PROCEDURE — 93005 ELECTROCARDIOGRAM TRACING: CPT

## 2017-06-06 PROCEDURE — 85610 PROTHROMBIN TIME: CPT | Performed by: EMERGENCY MEDICINE

## 2017-06-06 PROCEDURE — 65660000000 HC RM CCU STEPDOWN

## 2017-06-06 PROCEDURE — 74011250636 HC RX REV CODE- 250/636: Performed by: FAMILY MEDICINE

## 2017-06-06 PROCEDURE — 85730 THROMBOPLASTIN TIME PARTIAL: CPT | Performed by: EMERGENCY MEDICINE

## 2017-06-06 PROCEDURE — 85025 COMPLETE CBC W/AUTO DIFF WBC: CPT | Performed by: EMERGENCY MEDICINE

## 2017-06-06 PROCEDURE — 36430 TRANSFUSION BLD/BLD COMPNT: CPT

## 2017-06-06 PROCEDURE — 86900 BLOOD TYPING SEROLOGIC ABO: CPT | Performed by: EMERGENCY MEDICINE

## 2017-06-06 PROCEDURE — 99285 EMERGENCY DEPT VISIT HI MDM: CPT

## 2017-06-06 PROCEDURE — 30233N1 TRANSFUSION OF NONAUTOLOGOUS RED BLOOD CELLS INTO PERIPHERAL VEIN, PERCUTANEOUS APPROACH: ICD-10-PCS | Performed by: FAMILY MEDICINE

## 2017-06-06 RX ORDER — ONDANSETRON 2 MG/ML
4 INJECTION INTRAMUSCULAR; INTRAVENOUS ONCE
Status: COMPLETED | OUTPATIENT
Start: 2017-06-06 | End: 2017-06-06

## 2017-06-06 RX ORDER — FUROSEMIDE 10 MG/ML
20 INJECTION INTRAMUSCULAR; INTRAVENOUS
Status: COMPLETED | OUTPATIENT
Start: 2017-06-06 | End: 2017-06-06

## 2017-06-06 RX ORDER — SODIUM CHLORIDE 9 MG/ML
250 INJECTION, SOLUTION INTRAVENOUS AS NEEDED
Status: DISCONTINUED | OUTPATIENT
Start: 2017-06-06 | End: 2017-06-22 | Stop reason: HOSPADM

## 2017-06-06 RX ADMIN — FUROSEMIDE 20 MG: 10 INJECTION, SOLUTION INTRAMUSCULAR; INTRAVENOUS at 22:19

## 2017-06-06 RX ADMIN — ONDANSETRON 4 MG: 2 INJECTION INTRAMUSCULAR; INTRAVENOUS at 22:15

## 2017-06-06 NOTE — IP AVS SNAPSHOT
Summary of Care Report The Summary of Care report has been created to help improve care coordination. Users with access to Workspace or [facility] (Web-based application) may access additional patient information including the Discharge Summary. If you are not currently a [facility] user and need more information, please call the number listed below in the [place] [address] section and ask to be connected with Medical Records. Facility Information Name Address Phone [place] [address] [facility] [address] [phone] Patient Information Patient Name Sex  Andrés Rivas ([id number]) Female 1950 Discharge Information Admitting Provider Service Area Unit Mikael Yanez MD / [address] Telemetry / [phone] Discharge Provider Discharge Date/Time Discharge Disposition Destination (none) 2017 (Pending) SNF (none) Patient Language Language ENGLISH [13] Hospital Problems as of 2017  Reviewed: 2017 12:01 PM by Adelaide Mackay DPM  
  
  
  
 Class Noted - Resolved Last Modified POA Active Problems Hypervolemia  2017 - Present 2017 by Perfecto Schuster MD Unknown Entered by Perfecto Schuster MD  
  Anemia  2017 - Present 2017 by Perfecto Schuster MD Unknown Entered by Perfecto Schuster MD  
  
Non-Hospital Problems as of 2017  Reviewed: 2017 12:01 PM by Adelaide Mackay DPM  
  
  
  
 Class Noted - Resolved Last Modified Active Problems Nonischemic cardiomyopathy ([address] (Chronic)  2011 - Present 2017 by Aron Haley MD  
  Entered by Joshua Earl History of complete heart block  2011 - Present 2017 by Aron Haley MD  
  Entered by Joshua Earl Biventricular implantable cardioverter-defibrillator in situ  4/28/2005 - Present 4/6/2017 by Nora Dawson MD  
  Entered by Georgina Yanez Overview Addendum 4/6/2017 10:39 PM by Nora Dawson MD  
   Upgraded to BiV AICD; gen change 4/2008; pocket revision 10/2009; Abdominal - done on 8/22/2012 by Dr. Chrissy Graham Left bundle branch block (LBBB) on electrocardiogram (Chronic)  6/28/2011 - Present 4/6/2017 by Nora Dawson MD  
  Entered by Georgina Yanez Type 2 diabetes mellitus with diabetic neuropathy ([facility] (Chronic)  6/28/2011 - Present 4/20/2017 by Nora Dawson MD  
  Entered by Georgina Yanez Dyslipidemia (Chronic)  6/28/2011 - Present 4/6/2017 by Nora Dawson MD  
  Entered by Georgina Yanez Diabetic neuropathy associated with type 2 diabetes mellitus ([facility] (Chronic)  6/28/2011 - Present 4/6/2017 by Nora Dawson MD  
  Entered by Georgina Yanez Obstructive sleep apnea on CPAP (Chronic)  2/7/2012 - Present 4/6/2017 by Nora Dawson MD  
  Entered by Earl Santiago DO Overview Deleted 4/6/2017  5:45 PM by Nora Dawson MD  
     
  
  AICD generator infection [facility])  8/20/2012 - Present 4/24/2017 by Nora Dawson MD  
  Entered by Migdalia Lundy MD  
  Overview Signed 8/20/2012  6:13 PM by Migdalia Lundy MD  
   S/p explant 4 leads 8/20/12 Difficult airway for intubation  8/22/2012 - Present 8/23/2012 by Delilah Sheikh MD  
  Entered by Delilah Sheikh MD  
  Overview Signed 8/23/2012  9:53 AM by Delilah Sheikh MD  
   see anesthesia airway note   Benign hypertensive heart disease with systolic CHF, NYHA class 2 ([facility] (Chronic)  9/5/2012 - Present 4/20/2017 by Nora Dawson MD  
  Entered by Nora Dawson MD  
  Decreased calculated glomerular filtration rate (GFR)  3/30/2017 - Present 4/20/2017 by Nora Dawson MD  
  Entered by Nora Dawson MD  
  Overview Signed 4/6/2017  5:47 PM by Nora Dawson MD  
 Calculated GFR equivalent to that of CKD stage 3 = 30-59 ml/min Chronic anemia (Chronic)  9/5/2012 - Present 4/6/2017 by Ashley Avendano MD  
  Entered by Ashley Avendano MD  
  History of deep venous thrombosis  9/5/2012 - Present 4/6/2017 by Ashley Avendano MD  
  Entered by Ashley Avendano MD  
  Overview Addendum 4/6/2017  5:42 PM by Ashley Avendano MD  
   Left upper extremity Anticoagulated on Coumadin  9/5/2012 - Present 9/11/2012 Entered by Ashley Avendano MD  
  Pacemaker twiddler's syndrome  10/8/2012 - Present 10/11/2012 Entered by Iliana Vitale MD  
  Chronic systolic heart failure [facility]) (Chronic)  Unknown - Present 4/6/2017 by Ashley Avendano MD  
  Entered by Merly Stanford Obesity (BMI 35.0-39.9 without comorbidity) ([facility]  3/13/2017 - Present 3/13/2017 by Hu Galindo Entered by Hu Galindo History of pyelonephritis  3/30/2017 - Present 4/6/2017 by Ashley Avendano MD  
  Entered by Vesta Padgett, DO Iliopsoas muscle hematoma  3/30/2017 - Present 4/20/2017 by Ashley Avendano MD  
  Entered by Vesta Padgett, DO  
  Psoas hematoma, right, secondary to anticoagulant therapy  3/30/2017 - Present 4/20/2017 by Ashley Avendano MD  
  Entered by Tavon Graham MD  
  Impaired mobility and ADLs  3/30/2017 - Present 4/20/2017 by Ashley Avendano MD  
  Entered by Ashley Avendano MD  
  History of Coumadin therapy  Unknown - Present 4/20/2017 by Ashley Avendano MD  
  Entered by Ashley Avendano MD  
  Overview Signed 4/6/2017 10:35 PM by Ashley Avendano MD  
   Anticoagulation for chronic atrial fibrillation; Discontinued on 3/30/2017   Gout (Chronic)  Unknown - Present 4/7/2017 by Ashley Avendano MD  
  Entered by Ashley Avendano MD  
  Acute paraplegia [facility])  4/20/2017 - Present 4/24/2017 by Ashley Avendano MD  
  Entered by Andrew Prince MD  
  Sepsis [facility])  4/20/2017 - Present 4/20/2017 by Ashley Avendano MD  
 Entered by Yolanda Washington MD  
  Psoas abscess, right ([address]  4/20/2017 - Present 4/24/2017 by Yolanda Washington MD  
  Entered by Yolanda Washington MD  
  Krueger catheter in place on admission  4/20/2017 - Present 4/24/2017 by Yolanda Washington MD  
  Entered by Yolanda Washington MD  
  Urinary tract infection due to Enterococcus  4/20/2017 - Present 4/24/2017 by Yolanda Washington MD  
  Entered by Yolanda Washington MD  
  Group B streptococcal infection  4/20/2017 - Present 4/24/2017 by Yolanda Washington MD  
  Entered by Yolanda Washington MD  
  
You are allergic to the following Allergen Reactions Vancomycin Itching Ampicillin Itching Bactrim (Sulfamethoxazole-Trimethoprim) Unknown (comments) Blueberry Swelling Causes throat swelling Ciprofloxacin Itching Codeine Other (comments) Jumpy feeling Crestor (Rosuvastatin) Itching Darvocet A500 (Propoxyphene N-Acetaminophen) Itching Demerol (Meperidine) Itching Levaquin (Levofloxacin) Itching Lipitor (Atorvastatin) Myalgia Magnesium Oxide Itching  
 nausea Minocin (Minocycline) Unknown (comments) Pcn (Penicillins) Itching Pravachol (Pravastatin) Swelling Swelling in mouth Shellfish Derived Unknown (comments) Sulfa (Sulfonamide Antibiotics) Itching Ultracet (Tramadol-Acetaminophen) Itching Vicodin (Hydrocodone-Acetaminophen) Unknown (comments) Vytorin 10-10 (Ezetimibe-Simvastatin) Myalgia Percodan (Oxycodone Hcl-Oxycodone-Asa) Itching Current Discharge Medication List  
  
START taking these medications Dose & Instructions Dispensing Information Comments * diphenhydrAMINE 25 mg capsule Commonly known as:  BENADRYL Dose:  25 mg Take 1 Cap by mouth every six (6) hours as needed for Itching for up to 10 days. Quantity:  60 Cap Refills:  0  
   
 * diphenhydrAMINE 25 mg capsule Commonly known as:  BENADRYL Dose:  25 mg Take 1 Cap by mouth two (2) times a day for 10 days. Quantity:  60 Cap Refills:  0  
   
 furosemide 40 mg tablet Commonly known as:  LASIX  
 1 tab daily  Indications: Edema Quantity:  60 Tab Refills:  0  
   
 gabapentin 300 mg capsule Commonly known as:  NEURONTIN Dose:  600 mg Take 2 Caps by mouth two (2) times a day. Indications: NEUROPATHIC PAIN Quantity:  100 Cap Refills:  0  
   
 insulin lispro 100 unit/mL injection Commonly known as:  HUMALOG See sliding scale Quantity:  1 Vial  
Refills:  0  
   
 loperamide 1 mg/5 mL solution Commonly known as:  IMODIUM Dose:  2 mg Take 10 mL by mouth four (4) times daily as needed for Diarrhea. Quantity:  60 mL Refills:  0  
   
 magnesium oxide 400 mg tablet Commonly known as:  MAG-OX Dose:  400 mg Take 1 Tab by mouth two (2) times a day. Quantity:  60 Tab Refills:  0  
   
 potassium chloride 20 mEq tablet Commonly known as:  K-DUR, KLOR-CON Dose:  20 mEq Take 1 Tab by mouth two (2) times a day. Quantity:  30 Tab Refills:  0  
   
 * Notice: This list has 2 medication(s) that are the same as other medications prescribed for you. Read the directions carefully, and ask your doctor or other care provider to review them with you. CONTINUE these medications which have NOT CHANGED Dose & Instructions Dispensing Information Comments  
 acetaminophen 325 mg tablet Commonly known as:  TYLENOL Dose:  650 mg Take 2 Tabs by mouth every four (4) hours as needed (for fever or pain level less than 5/10). Indications: Fever, Pain Quantity:  30 Tab Refills:  0  
   
 albuterol-ipratropium 2.5 mg-0.5 mg/3 ml Nebu Commonly known as:  Annitta Cocks Dose:  3 mL  
3 mL by Nebulization route four (4) times daily. Quantity:  30 Nebule Refills:  0  
   
 allopurinol 100 mg tablet Commonly known as:  Kristine Matheus  Dose:  50 mg  
 Take 0.5 Tabs by mouth daily. Indications: HYPERURICEMIA Quantity:  30 Tab Refills:  0  
   
 aluminum-magnesium hydroxide 200-200 mg/5 mL suspension Commonly known as:  MAALOX Dose:  15 mL Take 15 mL by mouth four (4) times daily as needed for Indigestion. Quantity:  100 mL Refills:  0  
   
 cefTRIAXone 2 gram 2 g IVPB Dose:  2 g  
2 g by IntraVENous route every twenty-four (24) hours for 14 days. Quantity:  2 Dose Refills:  0  
   
 cholecalciferol 1,000 unit tablet Commonly known as:  VITAMIN D3 Dose:  2000 Units Take 2 Tabs by mouth daily. Indications: PREVENTION OF VITAMIN D DEFICIENCY Quantity:  30 Tab Refills:  0  
   
 famotidine 20 mg tablet Commonly known as:  PEPCID Dose:  20 mg Take 1 Tab by mouth nightly. Quantity:  30 Tab Refills:  0  
   
 ferrous sulfate 325 mg (65 mg iron) tablet Dose:  325 mg Take 1 Tab by mouth two (2) times daily (with meals). Quantity:  100 Tab Refills:  0  
   
 folic acid 1 mg tablet Commonly known as:  Google Dose:  1 mg Take 1 Tab by mouth daily. Quantity:  30 Tab Refills:  0  
   
 insulin aspart 100 unit/mL injection Commonly known as:  Destiny Kahn INITIATE INSULIN CORRECTIVE PROTOCOL (HR): Normal Insulin Sensitivity  For Blood Sugar (mg/dL) of:    Less than 150 =   0 units          150 -199 =   2 units 200 -249 =   4 units 250 -299 =   6 units 300 -349 =   8 units 350 and above =   10 units If 2 glucose readings are above 200 mg/dL Quantity:  10 mL Refills:  6  
   
 insulin glargine 100 unit/mL injection Commonly known as:  LANTUS Dose:  5 Units 5 Units by SubCUTAneous route nightly. Indications: type 2 diabetes mellitus Quantity:  1 Vial  
Refills:  0 Lactobacillus Acidoph & Bulgar 1 million cell Tab tablet Commonly known as:  Pennye Yasmany Dose:  1 Tab Take 1 Tab by mouth two (2) times a day. Quantity:  60 Tab Refills:  0  
   
 midodrine 2.5 mg tablet Commonly known as:  PROAMITINE  
 5 mg [2 tablets] po three time a day for 5 days then 2.5 mg po three time a day x 5 days then stop Quantity:  45 Tab Refills:  0  
   
 oxyCODONE-acetaminophen 7.5-325 mg per tablet Commonly known as:  PERCOCET 7.5 Dose:  1 Tab Take 1 Tab by mouth every six (6) hours as needed (for pain level greater than 5/10). Max Daily Amount: 4 Tabs. Indications: Pain Quantity:  60 Tab Refills:  0  
   
 polyethylene glycol 17 gram packet Commonly known as:  Aadli Canter Dose:  17 g Take 1 Packet by mouth two (2) times a day. Quantity:  30 Packet Refills:  0  
   
 pravastatin 40 mg tablet Commonly known as:  PRAVACHOL Dose:  40 mg Take 40 mg by mouth nightly. Indications: DYSLIPIDEMIA Refills:  0  
   
 senna-docusate 8.6-50 mg per tablet Commonly known as:  Brice Sagastume Dose:  2 Tab Take 2 Tabs by mouth daily (after dinner). Indications: Constipation Quantity:  30 Tab Refills:  0 ZOFRAN ODT 4 mg disintegrating tablet Generic drug:  ondansetron Dose:  4 mg Take 4 mg by mouth every eight (8) hours as needed for Nausea. Refills:  0 Follow-up Information Follow up With Details Comments Contact Info Zuly Garcia DO On 6/20/2017 @0830am [address] [facility] [address] [place] [id number] 
[phone] Fernanda Trammell MD On 8/8/2017 @1100am [facility] [address] [facility] [address] [place] [address] 
[phone] Discharge Instructions Anemia: Care Instructions Your Care Instructions Anemia is a low level of red blood cells, which carry oxygen throughout your body. Many things can cause anemia. Lack of iron is one of the most common causes. Your body needs iron to make hemoglobin, a substance in red blood cells that carries oxygen from the lungs to your body's cells. Without enough iron, the body produces fewer and smaller red blood cells. As a result, your body's cells do not get enough oxygen, and you feel tired and weak. And you may have trouble concentrating. Bleeding is the most common cause of a lack of iron. You may have heavy menstrual bleeding or bleeding caused by conditions such as ulcers, hemorrhoids, or cancer. Regular use of aspirin or other anti-inflammatory medicines (such as ibuprofen) also can cause bleeding in some people. A lack of iron in your diet also can cause anemia, especially at times when the body needs more iron, such as during pregnancy, infancy, and the teen years. Your doctor may have prescribed iron pills. It may take several months of treatment for your iron levels to return to normal. Your doctor also may suggest that you eat foods that are rich in iron, such as meat and beans. There are many other causes of anemia. It is not always due to a lack of iron. Finding the specific cause of your anemia will help your doctor find the right treatment for you. Follow-up care is a key part of your treatment and safety. Be sure to make and go to all appointments, and call your doctor if you are having problems. It's also a good idea to know your test results and keep a list of the medicines you take. How can you care for yourself at home? · Take your medicines exactly as prescribed. Call your doctor if you think you are having a problem with your medicine. · If your doctor recommends iron pills, take them as directed: ¨ Try to take the pills on an empty stomach about 1 hour before or 2 hours after meals. But you may need to take iron with food to avoid an upset stomach. ¨ Do not take antacids or drink milk or caffeine drinks (such as coffee, tea, or cola) at the same time or within 2 hours of the time that you take your iron. They can make it hard for your body to absorb the iron. ¨ Vitamin C (from food or supplements) helps your body absorb iron. Try taking iron pills with a glass of orange juice or some other food that is high in vitamin C, such as citrus fruits. ¨ Iron pills may cause stomach problems, such as heartburn, nausea, diarrhea, constipation, and cramps. Be sure to drink plenty of fluids, and include fruits, vegetables, and fiber in your diet each day. Iron pills often make your bowel movements dark or green. ¨ If you forget to take an iron pill, do not take a double dose of iron the next time you take a pill. ¨ Keep iron pills out of the reach of small children. An overdose of iron can be very dangerous. · Follow your doctor's advice about eating iron-rich foods. These include red meat, shellfish, poultry, eggs, beans, raisins, whole-grain bread, and leafy green vegetables. · Steam vegetables to help them keep their iron content. When should you call for help? Call 911 anytime you think you may need emergency care. For example, call if: 
· You have symptoms of a heart attack. These may include: ¨ Chest pain or pressure, or a strange feeling in the chest. 
¨ Sweating. ¨ Shortness of breath. ¨ Nausea or vomiting. ¨ Pain, pressure, or a strange feeling in the back, neck, jaw, or upper belly or in one or both shoulders or arms. ¨ Lightheadedness or sudden weakness. ¨ A fast or irregular heartbeat. After you call 911, the  may tell you to chew 1 adult-strength or 2 to 4 low-dose aspirin. Wait for an ambulance. Do not try to drive yourself. · You passed out (lost consciousness). Call your doctor now or seek immediate medical care if: 
· You have new or increased shortness of breath. · You are dizzy or lightheaded, or you feel like you may faint. · Your fatigue and weakness continue or get worse. · You have any abnormal bleeding, such as: 
¨ Nosebleeds.  
¨ Vaginal bleeding that is different (heavier, more frequent, at a different time of the month) than what you are used to. ¨ Bloody or black stools, or rectal bleeding. ¨ Bloody or pink urine. Watch closely for changes in your health, and be sure to contact your doctor if: 
· You do not get better as expected. Where can you learn more? Go to http://aren-mirella.info/. Enter R301 in the search box to learn more about \"Anemia: Care Instructions. \" Current as of: October 13, 2016 Content Version: 11.3 © 7218-7816 [employer]. Care instructions adapted under license by [employer] (which disclaims liability or warranty for this information). If you have questions about a medical condition or this instruction, always ask your healthcare professional. [address] any warranty or liability for your use of this information. Anemia: Care Instructions Your Care Instructions Anemia is a low level of red blood cells, which carry oxygen throughout your body. Many things can cause anemia. Lack of iron is one of the most common causes. Your body needs iron to make hemoglobin, a substance in red blood cells that carries oxygen from the lungs to your body's cells. Without enough iron, the body produces fewer and smaller red blood cells. As a result, your body's cells do not get enough oxygen, and you feel tired and weak. And you may have trouble concentrating. Bleeding is the most common cause of a lack of iron. You may have heavy menstrual bleeding or bleeding caused by conditions such as ulcers, hemorrhoids, or cancer. Regular use of aspirin or other anti-inflammatory medicines (such as ibuprofen) also can cause bleeding in some people. A lack of iron in your diet also can cause anemia, especially at times when the body needs more iron, such as during pregnancy, infancy, and the teen years. Your doctor may have prescribed iron pills.  It may take several months of treatment for your iron levels to return to normal. Your doctor also may suggest that you eat foods that are rich in iron, such as meat and beans. There are many other causes of anemia. It is not always due to a lack of iron. Finding the specific cause of your anemia will help your doctor find the right treatment for you. Follow-up care is a key part of your treatment and safety. Be sure to make and go to all appointments, and call your doctor if you are having problems. It's also a good idea to know your test results and keep a list of the medicines you take. How can you care for yourself at home? · Take your medicines exactly as prescribed. Call your doctor if you think you are having a problem with your medicine. · If your doctor recommends iron pills, take them as directed: ¨ Try to take the pills on an empty stomach about 1 hour before or 2 hours after meals. But you may need to take iron with food to avoid an upset stomach. ¨ Do not take antacids or drink milk or caffeine drinks (such as coffee, tea, or cola) at the same time or within 2 hours of the time that you take your iron. They can make it hard for your body to absorb the iron. ¨ Vitamin C (from food or supplements) helps your body absorb iron. Try taking iron pills with a glass of orange juice or some other food that is high in vitamin C, such as citrus fruits. ¨ Iron pills may cause stomach problems, such as heartburn, nausea, diarrhea, constipation, and cramps. Be sure to drink plenty of fluids, and include fruits, vegetables, and fiber in your diet each day. Iron pills often make your bowel movements dark or green. ¨ If you forget to take an iron pill, do not take a double dose of iron the next time you take a pill. ¨ Keep iron pills out of the reach of small children. An overdose of iron can be very dangerous. · Follow your doctor's advice about eating iron-rich foods.  These include red meat, shellfish, poultry, eggs, beans, raisins, whole-grain bread, and leafy green vegetables. · Steam vegetables to help them keep their iron content. When should you call for help? Call 911 anytime you think you may need emergency care. For example, call if: 
· You have symptoms of a heart attack. These may include: ¨ Chest pain or pressure, or a strange feeling in the chest. 
¨ Sweating. ¨ Shortness of breath. ¨ Nausea or vomiting. ¨ Pain, pressure, or a strange feeling in the back, neck, jaw, or upper belly or in one or both shoulders or arms. ¨ Lightheadedness or sudden weakness. ¨ A fast or irregular heartbeat. After you call 911, the  may tell you to chew 1 adult-strength or 2 to 4 low-dose aspirin. Wait for an ambulance. Do not try to drive yourself. · You passed out (lost consciousness). Call your doctor now or seek immediate medical care if: 
· You have new or increased shortness of breath. · You are dizzy or lightheaded, or you feel like you may faint. · Your fatigue and weakness continue or get worse. · You have any abnormal bleeding, such as: 
¨ Nosebleeds. ¨ Vaginal bleeding that is different (heavier, more frequent, at a different time of the month) than what you are used to. ¨ Bloody or black stools, or rectal bleeding. ¨ Bloody or pink urine. Watch closely for changes in your health, and be sure to contact your doctor if: 
· You do not get better as expected. Where can you learn more? Go to http://aren-mirella.info/. Enter R301 in the search box to learn more about \"Anemia: Care Instructions. \" Current as of: October 13, 2016 Content Version: 11.3 © 9952-7175 [employer]. Care instructions adapted under license by [employer] (which disclaims liability or warranty for this information).  If you have questions about a medical condition or this instruction, always ask your healthcare professional. [employer] disclaims any warranty or liability for your use of this information. Patient armband removed and shredded MyChart Activation Thank you for requesting access to [employer]. Please follow the instructions below to securely access and download your online medical record. V[employer] allows you to send messages to your doctor, view your test results, renew your prescriptions, schedule appointments, and more. How Do I Sign Up? 1. In your internet browser, go to [email or web address] 
2. Click on the First Time User? Click Here link in the Sign In box. You will be redirect to the New Member Sign Up page. 3. Enter your Vid[employer] Access Code exactly as it appears below. You will not need to use this code after youve completed the sign-up process. If you do not sign up before the expiration date, you must request a new code. VidSys Access Code: [email or web address] Expires: 2017  4:28 PM (This is the date your V[employer] access code will ) 4. Enter the last four digits of your Social Security Number (xxxx) and Date of Birth (mm/dd/yyyy) as indicated and click Submit. You will be taken to the next sign-up page. 5. Create a VidSys ID. This will be your V[employer] login ID and cannot be changed, so think of one that is secure and easy to remember. 6. Create a V[employer] password. You can change your password at any time. 7. Enter your Password Reset Question and Answer. This can be used at a later time if you forget your password. 8. Enter your e-mail address. You will receive e-mail notification when new information is available in [address]. 9. Click Sign Up. You can now view and download portions of your medical record. 10. Click the Download Summary menu link to download a portable copy of your medical information. Additional Information If you have questions, please visit the Frequently Asked Questions section of the [employer] website at [email or web address] Remember, MyChart is NOT to be used for urgent needs. For medical emergencies, dial 911. DISCHARGE SUMMARY from Nurse The following personal items are in your possession at time of discharge: 
 
Dental Appliances: None Visual Aid: None Home Medications: None Jewelry: None Clothing: None Other Valuables: None Personal Items Sent to Safe: none PATIENT INSTRUCTIONS: 
 
 
F-face looks uneven A-arms unable to move or move unevenly S-speech slurred or non-existent T-time-call 911 as soon as signs and symptoms begin-DO NOT go Back to bed or wait to see if you get better-TIME IS BRAIN. Warning Signs of HEART ATTACK Call 911 if you have these symptoms: 
? Chest discomfort. Most heart attacks involve discomfort in the center of the chest that lasts more than a few minutes, or that goes away and comes back. It can feel like uncomfortable pressure, squeezing, fullness, or pain. ? Discomfort in other areas of the upper body. Symptoms can include pain or discomfort in one or both arms, the back, neck, jaw, or stomach. ? Shortness of breath with or without chest discomfort. ? Other signs may include breaking out in a cold sweat, nausea, or lightheadedness. Don't wait more than five minutes to call [address]! Fast action can save your life. Calling 911 is almost always the fastest way to get lifesaving treatment. Emergency Medical Services staff can begin treatment when they arrive  up to an hour sooner than if someone gets to the hospital by car. The discharge information has been reviewed with the patient. The patient verbalized understanding.  
 
Discharge medications reviewed with the patient and appropriate educational materials and side effects teaching were provided. Chart Review Routing History Recipient Method Report Sent By Mara Camacho MD  
Fax: [phone] Phone: [phone] Fax Provider Comm Report Alf Ornelas [[id number]] 1/28/2011 10:23 AM 01/17/2011 Ingris Alanis MD  
Fax: [phone] Phone: [phone] Fax Provider Comm Report Jeanenne Freeze [[id number]] 8/3/2011  8:26 AM 08/02/2011 Alem Camacho DO Phone: [phone] [facility] CUSTOM LAB REPORT [place] [[id number]] 2/17/2012  9:29 AM 02/07/2012 [address] [place] [address] CUSTOM LAB REPORT [place] [[id number]] 2/17/2012  9:29 AM 02/07/2012 Ingris Alanis MD  
Fax: [phone] Phone: [phone] Fax Provider Comm Report Jeanenne Freeze [[id number]] 2/21/2012  9:45 AM 02/12/2012 Sally Mena MD  
Fax: [phone] Phone: [phone] Fax Provider Comm Report Jeanenne Freeze [[id number]] 2/21/2012  9:45 AM 02/12/2012 Julian Langston MD  
Fax: [phone] Phone: [phone] Fax Provider Comm Report Jeanenne Freeze [[id number]] 2/21/2012  9:45 AM 02/12/2012 Kojo Marino MD  
Fax: [phone] Phone: [phone] Fax Provider Comm Report Jeanenne Freeze [[id number]] 2/21/2012  9:45 AM 02/12/2012 Shola Oshea MD  
Fax: [phone] Phone: [phone] Fax Provider Comm Report Jeanenne Freeze [[id number]] 2/21/2012  9:45 AM 02/12/2012 Ingris Alanis MD  
Fax: [phone] Phone: [phone] Fax IP Auto Routed Trans José, Transcription [EDITRANS] 7/24/2012  6:19 AM 07/24/2012 Maribel Gutiérrez MD  
[address] Phone: [phone] Mail IP Auto Routed Trans José, Transcription [EDITRANS] 7/24/2012  6:19 AM 07/24/2012 Demond Navarro MD  
Phone: [phone] In Basket IP Auto Routed Trans José, Transcription [EDITRANS] 7/24/2012  6:19 AM 07/24/2012  Pamela Galvan MD  
 [address], [address] [facility] Phone: [phone] Mail IP Auto Routed Trans José, Transcription [EDITRANS] 7/24/2012  6:19 AM 07/24/2012 Daisy Mcintyre MD  
Phone: [phone] In [employer] Routed OSMAN Mar MD [[id number]] 7/24/2012  2:33 PM 07/24/2012 Ezra Alexander MD  
[address], [address] [facility] Phone: [phone] Mail IP Auto Routed OSMAN Mar MD [[id number]] 7/24/2012  2:33 PM 07/24/2012 Daisy Mcintyre MD  
Phone: [phone] In [facility] [place] IP Auto Routed Trans José, Transcription [EDITRANS] 7/25/2012  1:23 PM 07/25/2012 Ezra Alexander MD  
[address], [address] [facility] Phone: [phone] Mail IP Auto Routed Trans José, Transcription [EDITRANS] 7/25/2012  1:23 PM 07/25/2012 Fernando Byers MD  
Phone: [phone] Fax IP Auto Routed Trans José, Transcription [EDITRANS] 7/27/2012  9:46 AM 07/27/2012 Ezra Alexander MD  
[address], [address] [facility] Phone: [phone] Mail IP Auto Routed Trans José, Transcription [EDITRANS] 7/27/2012  9:46 AM 07/27/2012 Sung Al RN Phone: [phone] In [facility] CUSTOM LAB REPORT Brian Reason [[id number]] 8/15/2012  3:31 PM   
   
  [facility] CUSTOM LAB REPORT Brian Reason [[id number]] 8/15/2012  3:31 PM 08/15/2012 [facility] CUSTOM LAB REPORT Brian Hawk [[id number]] 8/15/2012  3:31 PM 08/15/2012 [facility] CUSTOM LAB REPORT Brian Hawk [[id number]] 8/15/2012  3:31 PM 08/15/2012 Ana Collazo MD  
Fax: [phone] Phone: [phone] Fax IP Auto Routed [employer] [address] 8/23/2012 11:49 AM 08/23/2012 Daisy Mcintyre MD  
Phone: [phone] In [employer] Routed [employer], [address] [place] [address] [[id number]] 8/23/2012 11:49 AM 08/23/2012 Umberto Navarro MD  
Phone: [phone] In Basket IP Auto Routed Trans José, Transcription [EDITRANS] 8/23/2012  1:01 PM 08/23/2012 Nakul Mancia MD  
Phone: [phone] In Basket IP Auto Routed Trans José, Transcription [EDITRANS] 8/23/2012  1:01 PM 08/23/2012 Nakul Mancia MD  
Phone: [phone] In [employer] Routed Jori Newman MD [[id number]] 8/29/2012 11:36 AM 08/29/2012 Romario Melara MD  
Phone: [phone] In [employer] Routed Jori Newman MD [[id number]] 8/29/2012 11:36 AM 08/29/2012 Nakul Mancia MD  
Phone: [phone] In [employer] Routed OSMAN Mar MD [[id number]] 9/5/2012 10:36 AM 09/05/2012 Nakul Mancia MD  
Phone: [phone] In [employer] Routed OSMAN Mar MD [[id number]] 9/5/2012  5:33 PM 09/05/2012 Nakul Mancia MD  
Phone: [phone] In Basket IP Auto Routed Trans José, Transcription [EDITRANS] 9/6/2012  4:07 PM 09/06/2012 Rob Rincon MD  
Fax: [phone] Phone: [phone] Fax IP Auto Routed Trans José, Transcription [EDITRANS] 9/7/2012 10:34 AM 09/07/2012 Pam Jacques MD  
Phone: [phone] Fax IP Auto Routed Trans José, Transcription [EDITRANS] 9/7/2012 10:34 AM 09/07/2012 Vanessa Haynes MD  
Phone: [phone] In Basket IP Auto Routed Trans José, Transcription [EDITRANS] 9/7/2012 10:34 AM 09/07/2012 Nakul Mancia MD  
Phone: [phone] In Basket IP Auto Routed Trans José, Transcription [EDITRANS] 9/7/2012 10:34 AM 09/07/2012 Romario Melara MD  
Phone: [phone] In Basket IP Auto Routed Trans José, Transcription [EDITRANS] 9/7/2012 10:34 AM 09/07/2012 Yocasta Caicedo DO Phone: [phone] In Basket IP Auto Routed Trans José, Transcription [EDITRANS] 9/7/2012 10:34 AM 09/07/2012 Charles Mercado MD  
Phone: [phone] In Basket IP Auto Routed Trans Charles Mercado MD [[id number]] 9/11/2012  7:09 PM 09/11/2012 Pam Jacques MD  
Phone: [phone] Fax IP Auto Routed Trans Marcelino Kuhn MD [[id number]] 9/11/2012  7:09 PM 09/11/2012 Omar Solis MD  
Phone: [phone] In Basket IP Auto Routed Trans Marcelino Kuhn MD [[id number]] 9/11/2012  7:09 PM 09/11/2012 Lilibeth Kendall MD  
Phone: [phone] In Basket IP Auto Routed Trans Marcelino Kuhn MD [[id number]] 9/11/2012  7:09 PM 09/11/2012 Markos Batres MD  
Fax: [phone] Phone: [phone] Fax Provider Comm Report Indra Tim [[id number]] 9/25/2012 11:43 AM 09/24/2012 Navarro Valera RN Phone: [phone] In [facility] CUSTOM LAB REPORT [address] [facility] [[id number]] 10/5/2012  9:16 AM 09/20/2012 [facility] CUSTOM LAB REPORT [address] [facility] [[id number]] 10/5/2012  9:16 AM 09/20/2012 Markos Batres MD  
Fax: [phone] Phone: [phone] Fax Provider Comm Report Indra Tim [[id number]] 10/8/2012 12:33 PM 10/05/2012 Markos Batres MD  
Fax: [phone] Phone: [phone] Fax Provider Comm Report Indra Tim [[id number]] 10/31/2012 10:18 AM 10/25/2012 Omar Solis MD  
Phone: [phone] In [employer] Routed Trans Mukesh Leon MD [[id number]] 11/1/2012  8:27 PM 11/01/2012 Mukesh Leon MD  
Phone: [phone] In [employer] Routed Trans Mukesh Leon MD [[id number]] 11/1/2012  8:27 PM 11/01/2012 Markos Batres MD  
Fax: [phone] Phone: [phone] Fax Provider Comm Report Indra Tim [[id number]] 11/13/2012  9:37 AM 11/10/2012 Markos Batres MD  
Fax: [phone] Phone: [phone] Fax Provider Comm Report Indra Tim [[id number]] 12/11/2012 10:24 AM 12/11/2012 Markos Batres MD  
Fax: [phone] Phone: [phone] Fax Provider Comm Report Indra Tim [[id number]] 12/13/2012 12:33 PM 12/11/2012 Vicente Elliott LPN Phone: [phone] In [address] [[id number]] 12/17/2012  2:33 PM 12/06/2012 Markos Batres MD  
Fax: [phone] Phone: [phone] Fax IP Auto Routed Trans Cordelia Concepcion MD [[id number]] 6/21/2015 11:47 AM 06/21/2015 Solange Cabrera MD  
Phone: [phone] In Basket IP Auto Routed Jelani Mckinney, MD [[id number]] 4/21/2017  1:01 PM 04/21/2017 Rosalio Eid, DO Fax: [phone] Phone: [phone] Fax IP Auto Routed Jelani Mckinney,  060 172 4/21/2017  1:01 PM 04/21/2017 Best Pacheco MD  
Phone: [phone] In Basket IP Auto Routed Marmariuszpamella Overall, MD [[id number]] 4/21/2017  1:01 PM 04/21/2017 Fabiola Rodriguez MD  
Phone: [phone] In Basket IP Auto Routed Jelani Mckinney, MD [[id number]] 4/21/2017  1:01 PM 04/21/2017 Paxton Burton MD  
Phone: [phone] In Basket IP Auto Routed Jelani Mckinney, MD [[id number]] 4/21/2017  1:01 PM 04/21/2017 Rosalio Eid, DO Fax: [phone] Phone: [phone] Fax IP Auto Routed Nikko Kim MD [[id number]] 5/25/2017  4:15 PM 05/25/2017 Elva Hoyos MD  
Phone: [phone] In Basket IP Auto Routed Nikko Kim MD [[id number]] 5/25/2017  4:15 PM 05/25/2017 Best Pacheco MD  
Phone: [phone] In Basket IP Auto Routed Nikko Kim MD [[id number]] 5/25/2017  4:15 PM 05/25/2017 Fabiola Rodriguez MD  
Phone: [phone] In Basket IP Auto Routed Nikko Kim MD [[id number]] 5/25/2017  4:15 PM 05/25/2017 Shahla Franks MD  
Fax: [phone] Phone: [phone] Fax IP Auto Routed Nikko Kim MD [[id number]] 5/25/2017  4:15 PM 05/25/2017 Orestes Hummel DPM  
Fax: [phone] Phone: [phone] Fax IP Auto Routed Nikko Kim MD [[id number]] 5/25/2017  4:15 PM 05/25/2017

## 2017-06-06 NOTE — Clinical Note
Status[de-identified] Inpatient [101] Type of Bed: Telemetry [19] Inpatient Hospitalization Certified Necessary for the Following Reasons: 3. Patient receiving treatment that can only be provided in an inpatient setting (further clarification in H&P documentation) Admitting Diagnosis: Anemia [272648] Admitting Diagnosis: Hypervolemia [667436] Admitting Physician: Paul Casillas Attending Physician: Paul Casillas Estimated Length of Stay: > or = to 2 Midnights Discharge Plan[de-identified] Home with Office Follow-up

## 2017-06-06 NOTE — IP AVS SNAPSHOT
Current Discharge Medication List  
  
START taking these medications Dose & Instructions Dispensing Information Comments Morning Noon Evening Bedtime * diphenhydrAMINE 25 mg capsule Commonly known as:  BENADRYL Your last dose was: Your next dose is:    
   
   
 Dose:  25 mg Take 1 Cap by mouth every six (6) hours as needed for Itching for up to 10 days. Quantity:  60 Cap Refills:  0  
     
   
   
   
  
 * diphenhydrAMINE 25 mg capsule Commonly known as:  BENADRYL Your last dose was: Your next dose is:    
   
   
 Dose:  25 mg Take 1 Cap by mouth two (2) times a day for 10 days. Quantity:  60 Cap Refills:  0  
     
   
   
   
  
 furosemide 40 mg tablet Commonly known as:  LASIX Your last dose was: Your next dose is:    
   
   
 1 tab daily  Indications: Edema Quantity:  60 Tab Refills:  0  
     
   
   
   
  
 gabapentin 300 mg capsule Commonly known as:  NEURONTIN Your last dose was: Your next dose is:    
   
   
 Dose:  600 mg Take 2 Caps by mouth two (2) times a day. Indications: NEUROPATHIC PAIN Quantity:  100 Cap Refills:  0  
     
   
   
   
  
 insulin lispro 100 unit/mL injection Commonly known as:  HUMALOG Your last dose was: Your next dose is:    
   
   
 See sliding scale Quantity:  1 Vial  
Refills:  0  
     
   
   
   
  
 loperamide 1 mg/5 mL solution Commonly known as:  IMODIUM Your last dose was: Your next dose is:    
   
   
 Dose:  2 mg Take 10 mL by mouth four (4) times daily as needed for Diarrhea. Quantity:  60 mL Refills:  0  
     
   
   
   
  
 magnesium oxide 400 mg tablet Commonly known as:  MAG-OX Your last dose was: Your next dose is:    
   
   
 Dose:  400 mg Take 1 Tab by mouth two (2) times a day. Quantity:  60 Tab Refills:  0  
     
   
   
   
  
 potassium chloride 20 mEq tablet Commonly known as:  K-DUR, KLOR-CON Your last dose was: Your next dose is:    
   
   
 Dose:  20 mEq Take 1 Tab by mouth two (2) times a day. Quantity:  30 Tab Refills:  0  
     
   
   
   
  
 * Notice: This list has 2 medication(s) that are the same as other medications prescribed for you. Read the directions carefully, and ask your doctor or other care provider to review them with you. CONTINUE these medications which have NOT CHANGED Dose & Instructions Dispensing Information Comments Morning Noon Evening Bedtime  
 acetaminophen 325 mg tablet Commonly known as:  TYLENOL Your last dose was: Your next dose is:    
   
   
 Dose:  650 mg Take 2 Tabs by mouth every four (4) hours as needed (for fever or pain level less than 5/10). Indications: Fever, Pain Quantity:  30 Tab Refills:  0  
     
   
   
   
  
 albuterol-ipratropium 2.5 mg-0.5 mg/3 ml Nebu Commonly known as:  Martha Cox Your last dose was: Your next dose is:    
   
   
 Dose:  3 mL  
3 mL by Nebulization route four (4) times daily. Quantity:  30 Nebule Refills:  0  
     
   
   
   
  
 allopurinol 100 mg tablet Commonly known as:  Mitcheal Teresa Your last dose was: Your next dose is:    
   
   
 Dose:  50 mg Take 0.5 Tabs by mouth daily. Indications: HYPERURICEMIA Quantity:  30 Tab Refills:  0  
     
   
   
   
  
 aluminum-magnesium hydroxide 200-200 mg/5 mL suspension Commonly known as:  MAALOX Your last dose was: Your next dose is:    
   
   
 Dose:  15 mL Take 15 mL by mouth four (4) times daily as needed for Indigestion. Quantity:  100 mL Refills:  0  
     
   
   
   
  
 cefTRIAXone 2 gram 2 g IVPB Your last dose was: Your next dose is:    
   
   
 Dose:  2 g  
2 g by IntraVENous route every twenty-four (24) hours for 14 days. Quantity:  2 Dose Refills:  0 cholecalciferol 1,000 unit tablet Commonly known as:  VITAMIN D3 Your last dose was: Your next dose is:    
   
   
 Dose:  2000 Units Take 2 Tabs by mouth daily. Indications: PREVENTION OF VITAMIN D DEFICIENCY Quantity:  30 Tab Refills:  0  
     
   
   
   
  
 famotidine 20 mg tablet Commonly known as:  PEPCID Your last dose was: Your next dose is:    
   
   
 Dose:  20 mg Take 1 Tab by mouth nightly. Quantity:  30 Tab Refills:  0  
     
   
   
   
  
 ferrous sulfate 325 mg (65 mg iron) tablet Your last dose was: Your next dose is:    
   
   
 Dose:  325 mg Take 1 Tab by mouth two (2) times daily (with meals). Quantity:  100 Tab Refills:  0  
     
   
   
   
  
 folic acid 1 mg tablet Commonly known as:  Google Your last dose was: Your next dose is:    
   
   
 Dose:  1 mg Take 1 Tab by mouth daily. Quantity:  30 Tab Refills:  0  
     
   
   
   
  
 insulin aspart 100 unit/mL injection Commonly known as:  Rhe Olive Your last dose was: Your next dose is: INITIATE INSULIN CORRECTIVE PROTOCOL (HR): Normal Insulin Sensitivity  For Blood Sugar (mg/dL) of:    Less than 150 =   0 units          150 -199 =   2 units 200 -249 =   4 units 250 -299 =   6 units 300 -349 =   8 units 350 and above =   10 units If 2 glucose readings are above 200 mg/dL Quantity:  10 mL Refills:  6  
     
   
   
   
  
 insulin glargine 100 unit/mL injection Commonly known as:  LANTUS Your last dose was: Your next dose is:    
   
   
 Dose:  5 Units 5 Units by SubCUTAneous route nightly. Indications: type 2 diabetes mellitus Quantity:  1 Vial  
Refills:  0 Lactobacillus Acidoph & Bulgar 1 million cell Tab tablet Commonly known as:  Marrie Mary Your last dose was: Your next dose is:    
   
   
 Dose:  1 Tab Take 1 Tab by mouth two (2) times a day. Quantity:  60 Tab Refills:  0  
     
   
   
   
  
 midodrine 2.5 mg tablet Commonly known as:  Se Balm Your last dose was: Your next dose is:    
   
   
 5 mg [2 tablets] po three time a day for 5 days then 2.5 mg po three time a day x 5 days then stop Quantity:  45 Tab Refills:  0  
     
   
   
   
  
 oxyCODONE-acetaminophen 7.5-325 mg per tablet Commonly known as:  PERCOCET 7.5 Your last dose was: Your next dose is:    
   
   
 Dose:  1 Tab Take 1 Tab by mouth every six (6) hours as needed (for pain level greater than 5/10). Max Daily Amount: 4 Tabs. Indications: Pain Quantity:  60 Tab Refills:  0  
     
   
   
   
  
 polyethylene glycol 17 gram packet Commonly known as:  [place]ou [place] Your last dose was: Your next dose is:    
   
   
 Dose:  17 g Take 1 Packet by mouth two (2) times a day. Quantity:  30 Packet Refills:  0  
     
   
   
   
  
 pravastatin 40 mg tablet Commonly known as:  PRAVACHOL Your last dose was: Your next dose is:    
   
   
 Dose:  40 mg Take 40 mg by mouth nightly. Indications: DYSLIPIDEMIA Refills:  0  
     
   
   
   
  
 senna-docusate 8.6-50 mg per tablet Commonly known as:  Sri Rhodes Your last dose was: Your next dose is:    
   
   
 Dose:  2 Tab Take 2 Tabs by mouth daily (after dinner). Indications: Constipation Quantity:  30 Tab Refills:  0 ZOFRAN ODT 4 mg disintegrating tablet Generic drug:  ondansetron Your last dose was: Your next dose is:    
   
   
 Dose:  4 mg Take 4 mg by mouth every eight (8) hours as needed for Nausea. Refills:  0 Where to Get Your Medications These medications were sent to [address], [address] [place], [place] [id number] Phone:  [phone]  
  diphenhydrAMINE 25 mg capsule diphenhydrAMINE 25 mg capsule  
 ferrous sulfate 325 mg (65 mg iron) tablet  
 furosemide 40 mg tablet  
 gabapentin 300 mg capsule Lactobacillus Acidoph & Bulgar 1 million cell Tab tablet  
 magnesium oxide 400 mg tablet  
 potassium chloride 20 mEq tablet Information on where to get these meds will be given to you by the nurse or doctor. ! Ask your nurse or doctor about these medications  
  cefTRIAXone 2 gram 2 g IVPB  
 insulin lispro 100 unit/mL injection  
 loperamide 1 mg/5 mL solution  
 oxyCODONE-acetaminophen 7.5-325 mg per tablet

## 2017-06-06 NOTE — TELEPHONE ENCOUNTER
[facility] REGARDING JOSÉ MIGUEL MARS IS RETURNING ELE'S CALL, TRIED CALLING THE DESK BUT THERE WAS NO ANSWER, PLEASE CALL HER BACK.

## 2017-06-06 NOTE — TELEPHONE ENCOUNTER
Attempted to return call to [address] [facility] to make aware of ROSSI Guido's message. No answer, number just rang multiple times and then disconnected.

## 2017-06-06 NOTE — ED TRIAGE NOTES
Patient to ED by Lexei Velazquez from [facility] with c/o worsening anemia, left leg pain, and hand tremors. Patient alert and oriented x 4. Patient with c/o paraplegia, stroke, and DM. VSS. Glucose 233. On IV abx through [address] line for spinal abscess.

## 2017-06-06 NOTE — IP AVS SNAPSHOT
[address] Patient: Theo Davalos MRN: [id number] UAN:[id number] You are allergic to the following Allergen Reactions Vancomycin Itching Ampicillin Itching Bactrim (Sulfamethoxazole-Trimethoprim) Unknown (comments) Blueberry Swelling Causes throat swelling Ciprofloxacin Itching Codeine Other (comments) Jumpy feeling Crestor (Rosuvastatin) Itching Darvocet A500 (Propoxyphene N-Acetaminophen) Itching Demerol (Meperidine) Itching Levaquin (Levofloxacin) Itching Lipitor (Atorvastatin) Myalgia Magnesium Oxide Itching  
 nausea Minocin (Minocycline) Unknown (comments) Pcn (Penicillins) Itching Pravachol (Pravastatin) Swelling Swelling in mouth Shellfish Derived Unknown (comments) Sulfa (Sulfonamide Antibiotics) Itching Ultracet (Tramadol-Acetaminophen) Itching Vicodin (Hydrocodone-Acetaminophen) Unknown (comments) Vytorin 10-10 (Ezetimibe-Simvastatin) Myalgia Percodan (Oxycodone Hcl-Oxycodone-Asa) Itching Recent Documentation Height Weight Breastfeeding? BMI OB Status Smoking Status 1.702 m 106.1 kg No 36.65 kg/m2 Hysterectomy Never Smoker Emergency Contacts Name Discharge Info Relation Home Work Mobile Tomi Gonzalez DISCHARGE CAREGIVER [3] Spouse [3] [phone] About your hospitalization You were admitted on:  June 6, 2017 You last received care in the:  [address] [place] [address] You were discharged on:  June 22, 2017 Unit phone number:  [phone] Why you were hospitalized Your primary diagnosis was:  Not on File Your diagnoses also included:  Hypervolemia, Anemia Providers Seen During Your Hospitalizations Provider Role Specialty Primary office phone Pool Davies MD Attending Provider Emergency Medicine [phone] Beti Frazier MD Attending Provider [facility] [phone] Your Primary Care Physician (PCP) Primary Care Physician Office Phone Office Fax Hawa Sylvester [phone] Follow-up Information Follow up With Details Comments Contact Info Nikki Morejon DO On 6/20/2017 @0830am [address] [place] [address] [address] [address] 
[phone] Roz Dobbs MD On 8/8/2017 @1100am [address] [facility] [address] [place] [address] 
[phone] Your Appointments Thursday June 29, 2017  9:00 AM EDT PROCEDURE with Pacer [facility] \[facility]\"" ([facility]) [facility] [address] [address] [address] [phone] Current Discharge Medication List  
  
START taking these medications Dose & Instructions Dispensing Information Comments Morning Noon Evening Bedtime * diphenhydrAMINE 25 mg capsule Commonly known as:  BENADRYL Your last dose was: Your next dose is:    
   
   
 Dose:  25 mg Take 1 Cap by mouth every six (6) hours as needed for Itching for up to 10 days. Quantity:  60 Cap Refills:  0  
     
   
   
   
  
 * diphenhydrAMINE 25 mg capsule Commonly known as:  BENADRYL Your last dose was: Your next dose is:    
   
   
 Dose:  25 mg Take 1 Cap by mouth two (2) times a day for 10 days. Quantity:  60 Cap Refills:  0  
     
   
   
   
  
 furosemide 40 mg tablet Commonly known as:  LASIX Your last dose was: Your next dose is:    
   
   
 1 tab daily  Indications: Edema Quantity:  60 Tab Refills:  0  
     
   
   
   
  
 gabapentin 300 mg capsule Commonly known as:  NEURONTIN Your last dose was:     
   
Your next dose is:    
   
   
 Dose:  600 mg  
 Take 2 Caps by mouth two (2) times a day. Indications: NEUROPATHIC PAIN Quantity:  100 Cap Refills:  0  
     
   
   
   
  
 insulin lispro 100 unit/mL injection Commonly known as:  HUMALOG Your last dose was: Your next dose is:    
   
   
 See sliding scale Quantity:  1 Vial  
Refills:  0  
     
   
   
   
  
 loperamide 1 mg/5 mL solution Commonly known as:  IMODIUM Your last dose was: Your next dose is:    
   
   
 Dose:  2 mg Take 10 mL by mouth four (4) times daily as needed for Diarrhea. Quantity:  60 mL Refills:  0  
     
   
   
   
  
 magnesium oxide 400 mg tablet Commonly known as:  MAG-OX Your last dose was: Your next dose is:    
   
   
 Dose:  400 mg Take 1 Tab by mouth two (2) times a day. Quantity:  60 Tab Refills:  0  
     
   
   
   
  
 potassium chloride 20 mEq tablet Commonly known as:  K-DUR, KLOR-CON Your last dose was: Your next dose is:    
   
   
 Dose:  20 mEq Take 1 Tab by mouth two (2) times a day. Quantity:  30 Tab Refills:  0  
     
   
   
   
  
 * Notice: This list has 2 medication(s) that are the same as other medications prescribed for you. Read the directions carefully, and ask your doctor or other care provider to review them with you. CONTINUE these medications which have NOT CHANGED Dose & Instructions Dispensing Information Comments Morning Noon Evening Bedtime  
 acetaminophen 325 mg tablet Commonly known as:  TYLENOL Your last dose was: Your next dose is:    
   
   
 Dose:  650 mg Take 2 Tabs by mouth every four (4) hours as needed (for fever or pain level less than 5/10). Indications: Fever, Pain Quantity:  30 Tab Refills:  0  
     
   
   
   
  
 albuterol-ipratropium 2.5 mg-0.5 mg/3 ml Nebu Commonly known as:  Ivet Amezcua Your last dose was: Your next dose is:    
   
   
 Dose:  3 mL 3 mL by Nebulization route four (4) times daily. Quantity:  30 Nebule Refills:  0  
     
   
   
   
  
 allopurinol 100 mg tablet Commonly known as:  Edgar Lopez Your last dose was: Your next dose is:    
   
   
 Dose:  50 mg Take 0.5 Tabs by mouth daily. Indications: HYPERURICEMIA Quantity:  30 Tab Refills:  0  
     
   
   
   
  
 aluminum-magnesium hydroxide 200-200 mg/5 mL suspension Commonly known as:  MAALOX Your last dose was: Your next dose is:    
   
   
 Dose:  15 mL Take 15 mL by mouth four (4) times daily as needed for Indigestion. Quantity:  100 mL Refills:  0  
     
   
   
   
  
 cefTRIAXone 2 gram 2 g IVPB Your last dose was: Your next dose is:    
   
   
 Dose:  2 g  
2 g by IntraVENous route every twenty-four (24) hours for 14 days. Quantity:  2 Dose Refills:  0  
     
   
   
   
  
 cholecalciferol 1,000 unit tablet Commonly known as:  VITAMIN D3 Your last dose was: Your next dose is:    
   
   
 Dose:  2000 Units Take 2 Tabs by mouth daily. Indications: PREVENTION OF VITAMIN D DEFICIENCY Quantity:  30 Tab Refills:  0  
     
   
   
   
  
 famotidine 20 mg tablet Commonly known as:  PEPCID Your last dose was: Your next dose is:    
   
   
 Dose:  20 mg Take 1 Tab by mouth nightly. Quantity:  30 Tab Refills:  0  
     
   
   
   
  
 ferrous sulfate 325 mg (65 mg iron) tablet Your last dose was: Your next dose is:    
   
   
 Dose:  325 mg Take 1 Tab by mouth two (2) times daily (with meals). Quantity:  100 Tab Refills:  0  
     
   
   
   
  
 folic acid 1 mg tablet Commonly known as:  Google Your last dose was: Your next dose is:    
   
   
 Dose:  1 mg Take 1 Tab by mouth daily. Quantity:  30 Tab Refills:  0  
     
   
   
   
  
 insulin aspart 100 unit/mL injection Commonly known as:  Harsha Soto Your last dose was: Your next dose is: INITIATE INSULIN CORRECTIVE PROTOCOL (HR): Normal Insulin Sensitivity  For Blood Sugar (mg/dL) of:    Less than 150 =   0 units          150 -199 =   2 units 200 -249 =   4 units 250 -299 =   6 units 300 -349 =   8 units 350 and above =   10 units If 2 glucose readings are above 200 mg/dL Quantity:  10 mL Refills:  6  
     
   
   
   
  
 insulin glargine 100 unit/mL injection Commonly known as:  LANTUS Your last dose was: Your next dose is:    
   
   
 Dose:  5 Units 5 Units by SubCUTAneous route nightly. Indications: type 2 diabetes mellitus Quantity:  1 Vial  
Refills:  0 Lactobacillus Acidoph & Bulgar 1 million cell Tab tablet Commonly known as:  Ezekiel Furnish Your last dose was: Your next dose is:    
   
   
 Dose:  1 Tab Take 1 Tab by mouth two (2) times a day. Quantity:  60 Tab Refills:  0  
     
   
   
   
  
 midodrine 2.5 mg tablet Commonly known as:  Se Balm Your last dose was: Your next dose is:    
   
   
 5 mg [2 tablets] po three time a day for 5 days then 2.5 mg po three time a day x 5 days then stop Quantity:  45 Tab Refills:  0  
     
   
   
   
  
 oxyCODONE-acetaminophen 7.5-325 mg per tablet Commonly known as:  PERCOCET 7.5 Your last dose was: Your next dose is:    
   
   
 Dose:  1 Tab Take 1 Tab by mouth every six (6) hours as needed (for pain level greater than 5/10). Max Daily Amount: 4 Tabs. Indications: Pain Quantity:  60 Tab Refills:  0  
     
   
   
   
  
 polyethylene glycol 17 gram packet Commonly known as:  Lindalou Marcie Your last dose was: Your next dose is:    
   
   
 Dose:  17 g Take 1 Packet by mouth two (2) times a day. Quantity:  30 Packet Refills:  0  
     
   
   
   
  
 pravastatin 40 mg tablet Commonly known as:  PRAVACHOL Your last dose was: Your next dose is:    
   
   
 Dose:  40 mg Take 40 mg by mouth nightly. Indications: DYSLIPIDEMIA Refills:  0  
     
   
   
   
  
 senna-docusate 8.6-50 mg per tablet Commonly known as:  Toy Forth Your last dose was: Your next dose is:    
   
   
 Dose:  2 Tab Take 2 Tabs by mouth daily (after dinner). Indications: Constipation Quantity:  30 Tab Refills:  0 ZOFRAN ODT 4 mg disintegrating tablet Generic drug:  ondansetron Your last dose was: Your next dose is:    
   
   
 Dose:  4 mg Take 4 mg by mouth every eight (8) hours as needed for Nausea. Refills:  0 Where to Get Your Medications These medications were sent to [address], [address] [place] [address] Phone:  [phone]  
  diphenhydrAMINE 25 mg capsule  
 diphenhydrAMINE 25 mg capsule  
 ferrous sulfate 325 mg (65 mg iron) tablet  
 furosemide 40 mg tablet  
 gabapentin 300 mg capsule Lactobacillus Acidoph & Bulgar 1 million cell Tab tablet  
 magnesium oxide 400 mg tablet  
 potassium chloride 20 mEq tablet Information on where to get these meds will be given to you by the nurse or doctor. ! Ask your nurse or doctor about these medications  
  cefTRIAXone 2 gram 2 g IVPB  
 insulin lispro 100 unit/mL injection  
 loperamide 1 mg/5 mL solution  
 oxyCODONE-acetaminophen 7.5-325 mg per tablet Discharge Instructions Anemia: Care Instructions Your Care Instructions Anemia is a low level of red blood cells, which carry oxygen throughout your body. Many things can cause anemia. Lack of iron is one of the most common causes. Your body needs iron to make hemoglobin, a substance in red blood cells that carries oxygen from the lungs to your body's cells. Without enough iron, the body produces fewer and smaller red blood cells. As a result, your body's cells do not get enough oxygen, and you feel tired and weak. And you may have trouble concentrating. Bleeding is the most common cause of a lack of iron. You may have heavy menstrual bleeding or bleeding caused by conditions such as ulcers, hemorrhoids, or cancer. Regular use of aspirin or other anti-inflammatory medicines (such as ibuprofen) also can cause bleeding in some people. A lack of iron in your diet also can cause anemia, especially at times when the body needs more iron, such as during pregnancy, infancy, and the teen years. Your doctor may have prescribed iron pills. It may take several months of treatment for your iron levels to return to normal. Your doctor also may suggest that you eat foods that are rich in iron, such as meat and beans. There are many other causes of anemia. It is not always due to a lack of iron. Finding the specific cause of your anemia will help your doctor find the right treatment for you. Follow-up care is a key part of your treatment and safety. Be sure to make and go to all appointments, and call your doctor if you are having problems. It's also a good idea to know your test results and keep a list of the medicines you take. How can you care for yourself at home? · Take your medicines exactly as prescribed. Call your doctor if you think you are having a problem with your medicine. · If your doctor recommends iron pills, take them as directed: ¨ Try to take the pills on an empty stomach about 1 hour before or 2 hours after meals. But you may need to take iron with food to avoid an upset stomach. ¨ Do not take antacids or drink milk or caffeine drinks (such as coffee, tea, or cola) at the same time or within 2 hours of the time that you take your iron. They can make it hard for your body to absorb the iron. ¨ Vitamin C (from food or supplements) helps your body absorb iron.  Try taking iron pills with a glass of orange juice or some other food that is high in vitamin C, such as citrus fruits. ¨ Iron pills may cause stomach problems, such as heartburn, nausea, diarrhea, constipation, and cramps. Be sure to drink plenty of fluids, and include fruits, vegetables, and fiber in your diet each day. Iron pills often make your bowel movements dark or green. ¨ If you forget to take an iron pill, do not take a double dose of iron the next time you take a pill. ¨ Keep iron pills out of the reach of small children. An overdose of iron can be very dangerous. · Follow your doctor's advice about eating iron-rich foods. These include red meat, shellfish, poultry, eggs, beans, raisins, whole-grain bread, and leafy green vegetables. · Steam vegetables to help them keep their iron content. When should you call for help? Call 911 anytime you think you may need emergency care. For example, call if: 
· You have symptoms of a heart attack. These may include: ¨ Chest pain or pressure, or a strange feeling in the chest. 
¨ Sweating. ¨ Shortness of breath. ¨ Nausea or vomiting. ¨ Pain, pressure, or a strange feeling in the back, neck, jaw, or upper belly or in one or both shoulders or arms. ¨ Lightheadedness or sudden weakness. ¨ A fast or irregular heartbeat. After you call 911, the  may tell you to chew 1 adult-strength or 2 to 4 low-dose aspirin. Wait for an ambulance. Do not try to drive yourself. · You passed out (lost consciousness). Call your doctor now or seek immediate medical care if: 
· You have new or increased shortness of breath. · You are dizzy or lightheaded, or you feel like you may faint. · Your fatigue and weakness continue or get worse. · You have any abnormal bleeding, such as: 
¨ Nosebleeds. ¨ Vaginal bleeding that is different (heavier, more frequent, at a different time of the month) than what you are used to. ¨ Bloody or black stools, or rectal bleeding. ¨ Bloody or pink urine. Watch closely for changes in your health, and be sure to contact your doctor if: 
· You do not get better as expected. Where can you learn more? Go to http://aren-mirella.info/. Enter R301 in the search box to learn more about \"Anemia: Care Instructions. \" Current as of: October 13, 2016 Content Version: 11.3 © 4394-9381 [employer]. Care instructions adapted under license by [employer] (which disclaims liability or warranty for this information). If you have questions about a medical condition or this instruction, always ask your healthcare professional. [place] [address] any warranty or liability for your use of this information. Anemia: Care Instructions Your Care Instructions Anemia is a low level of red blood cells, which carry oxygen throughout your body. Many things can cause anemia. Lack of iron is one of the most common causes. Your body needs iron to make hemoglobin, a substance in red blood cells that carries oxygen from the lungs to your body's cells. Without enough iron, the body produces fewer and smaller red blood cells. As a result, your body's cells do not get enough oxygen, and you feel tired and weak. And you may have trouble concentrating. Bleeding is the most common cause of a lack of iron. You may have heavy menstrual bleeding or bleeding caused by conditions such as ulcers, hemorrhoids, or cancer. Regular use of aspirin or other anti-inflammatory medicines (such as ibuprofen) also can cause bleeding in some people. A lack of iron in your diet also can cause anemia, especially at times when the body needs more iron, such as during pregnancy, infancy, and the teen years. Your doctor may have prescribed iron pills. It may take several months of treatment for your iron levels to return to normal. Your doctor also may suggest that you eat foods that are rich in iron, such as meat and beans. There are many other causes of anemia. It is not always due to a lack of iron. Finding the specific cause of your anemia will help your doctor find the right treatment for you. Follow-up care is a key part of your treatment and safety. Be sure to make and go to all appointments, and call your doctor if you are having problems. It's also a good idea to know your test results and keep a list of the medicines you take. How can you care for yourself at home? · Take your medicines exactly as prescribed. Call your doctor if you think you are having a problem with your medicine. · If your doctor recommends iron pills, take them as directed: ¨ Try to take the pills on an empty stomach about 1 hour before or 2 hours after meals. But you may need to take iron with food to avoid an upset stomach. ¨ Do not take antacids or drink milk or caffeine drinks (such as coffee, tea, or cola) at the same time or within 2 hours of the time that you take your iron. They can make it hard for your body to absorb the iron. ¨ Vitamin C (from food or supplements) helps your body absorb iron. Try taking iron pills with a glass of orange juice or some other food that is high in vitamin C, such as citrus fruits. ¨ Iron pills may cause stomach problems, such as heartburn, nausea, diarrhea, constipation, and cramps. Be sure to drink plenty of fluids, and include fruits, vegetables, and fiber in your diet each day. Iron pills often make your bowel movements dark or green. ¨ If you forget to take an iron pill, do not take a double dose of iron the next time you take a pill. ¨ Keep iron pills out of the reach of small children. An overdose of iron can be very dangerous. · Follow your doctor's advice about eating iron-rich foods. These include red meat, shellfish, poultry, eggs, beans, raisins, whole-grain bread, and leafy green vegetables. · Steam vegetables to help them keep their iron content. When should you call for help? Call 911 anytime you think you may need emergency care. For example, call if: 
· You have symptoms of a heart attack. These may include: ¨ Chest pain or pressure, or a strange feeling in the chest. 
¨ Sweating. ¨ Shortness of breath. ¨ Nausea or vomiting. ¨ Pain, pressure, or a strange feeling in the back, neck, jaw, or upper belly or in one or both shoulders or arms. ¨ Lightheadedness or sudden weakness. ¨ A fast or irregular heartbeat. After you call 911, the  may tell you to chew 1 adult-strength or 2 to 4 low-dose aspirin. Wait for an ambulance. Do not try to drive yourself. · You passed out (lost consciousness). Call your doctor now or seek immediate medical care if: 
· You have new or increased shortness of breath. · You are dizzy or lightheaded, or you feel like you may faint. · Your fatigue and weakness continue or get worse. · You have any abnormal bleeding, such as: 
¨ Nosebleeds. ¨ Vaginal bleeding that is different (heavier, more frequent, at a different time of the month) than what you are used to. ¨ Bloody or black stools, or rectal bleeding. ¨ Bloody or pink urine. Watch closely for changes in your health, and be sure to contact your doctor if: 
· You do not get better as expected. Where can you learn more? Go to http://aren-mirella.info/. Enter R301 in the search box to learn more about \"Anemia: Care Instructions. \" Current as of: October 13, 2016 Content Version: 11.3 © 5869-5017 [employer]. Care instructions adapted under license by [employer] (which disclaims liability or warranty for this information). If you have questions about a medical condition or this instruction, always ask your healthcare professional. [place] [address] any warranty or liability for your use of this information. Patient armband removed and shredded MyChart Activation Thank you for requesting access to [employer]. Please follow the instructions below to securely access and download your online medical record. [employer] allows you to send messages to your doctor, view your test results, renew your prescriptions, schedule appointments, and more. How Do I Sign Up? 1. In your internet browser, go to [email or web address] 
2. Click on the First Time User? Click Here link in the Sign In box. You will be redirect to the New Member Sign Up page. 3. Enter your [employer] Access Code exactly as it appears below. You will not need to use this code after youve completed the sign-up process. If you do not sign up before the expiration date, you must request a new code. [employer] Access Code: [email or web address] Expires: 2017  4:28 PM (This is the date your [employer] access code will ) 4. Enter the last four digits of your Social Security Number (xxxx) and Date of Birth (mm/dd/yyyy) as indicated and click Submit. You will be taken to the next sign-up page. 5. Create a [employer] ID. This will be your [employer] login ID and cannot be changed, so think of one that is secure and easy to remember. 6. Create a [employer] password. You can change your password at any time. 7. Enter your Password Reset Question and Answer. This can be used at a later time if you forget your password. 8. Enter your e-mail address. You will receive e-mail notification when new information is available in [address] 9. Click Sign Up. You can now view and download portions of your medical record. 10. Click the Download Summary menu link to download a portable copy of your medical information. Additional Information If you have questions, please visit the Frequently Asked Questions section of the [employer] website at [email or web address] Remember, [employer] is NOT to be used for urgent needs. For medical emergencies, dial 911. DISCHARGE SUMMARY from Nurse The following personal items are in your possession at time of discharge: 
 
Dental Appliances: None Visual Aid: None Home Medications: None Jewelry: None Clothing: None Other Valuables: None Personal Items Sent to Safe: none PATIENT INSTRUCTIONS: 
 
 
F-face looks uneven A-arms unable to move or move unevenly S-speech slurred or non-existent T-time-call 911 as soon as signs and symptoms begin-DO NOT go Back to bed or wait to see if you get better-TIME IS BRAIN. Warning Signs of HEART ATTACK Call 911 if you have these symptoms: 
? Chest discomfort. Most heart attacks involve discomfort in the center of the chest that lasts more than a few minutes, or that goes away and comes back. It can feel like uncomfortable pressure, squeezing, fullness, or pain. ? Discomfort in other areas of the upper body. Symptoms can include pain or discomfort in one or both arms, the back, neck, jaw, or stomach. ? Shortness of breath with or without chest discomfort. ? Other signs may include breaking out in a cold sweat, nausea, or lightheadedness. Don't wait more than five minutes to call [address]! Fast action can save your life. Calling 911 is almost always the fastest way to get lifesaving treatment. Emergency Medical Services staff can begin treatment when they arrive  up to an hour sooner than if someone gets to the hospital by car. The discharge information has been reviewed with the patient. The patient verbalized understanding. Discharge medications reviewed with the patient and appropriate educational materials and side effects teaching were provided. Discharge Instructions Attachments/References FUROSEMIDE (BY MOUTH) (ENGLISH) MEFS - CEFTRIAXONE (ROCEPHIN, NOVAPLUS CEFTRIAXONE, AMERINET CHOICE CEFTRIAXONE) - (BY INJECTION) (ENGLISH) MAGNESIUM OXIDE (BY MOUTH) (ENGLISH) ACETAMINOPHEN/CODEINE (BY MOUTH) (ENGLISH) OXYCODONE/ACETAMINOPHEN (BY MOUTH) (ENGLISH) GABAPENTIN (BY MOUTH) (ENGLISH) ANTIHISTAMINE/DECONGESTANT (BY MOUTH) (ENGLISH) Discharge Orders None [facility] Announcement We are excited to announce that we are making your provider's discharge notes available to you in [employer]. You will see these notes when they are completed and signed by the physician that discharged you from your recent hospital stay. If you have any questions or concerns about any information you see in [employer], please call the Health Information Department where you were seen or reach out to your Primary Care Provider for more information about your plan of care. Introducing [facility]! [facility] introduces [employer] patient portal. Now you can access parts of your medical record, email your doctor's office, and request medication refills online. 1. In your internet browser, go to [email or web address] 2. Click on the First Time User? Click Here link in the Sign In box. You will see the New Member Sign Up page. 3. Enter your [employer] Access Code exactly as it appears below. You will not need to use this code after youve completed the sign-up process. If you do not sign up before the expiration date, you must request a new code. · [employer] Access Code: [email or web address] Expires: 8/23/2017  4:28 PM 
 
4. Enter the last four digits of your Social Security Number (xxxx) and Date of Birth (mm/dd/yyyy) as indicated and click Submit. You will be taken to the next sign-up page. 5. Create a [employer] ID. This will be your [employer] login ID and cannot be changed, so think of one that is secure and easy to remember. 6. Create a [employer] password. You can change your password at any time. 7. Enter your Password Reset Question and Answer. This can be used at a later time if you forget your password. 8. Enter your e-mail address. You will receive e-mail notification when new information is available in [address] 9. Click Sign Up. You can now view and download portions of your medical record. 10. Click the Download Summary menu link to download a portable copy of your medical information. If you have questions, please visit the Frequently Asked Questions section of the [employer] website. Remember, [employer] is NOT to be used for urgent needs. For medical emergencies, dial 911. Now available from your iPhone and Android! General Information Please provide this summary of care documentation to your next provider. Patient Signature:  ____________________________________________________________ Date:  ____________________________________________________________  
  
[place]is [place] Provider Signature:  ____________________________________________________________ Date:  ____________________________________________________________ More Information Furosemide (By mouth) Furosemide (akxn-DT-lg-mide) Treats fluid retention (edema) and high blood pressure. This medicine is a diuretic (water pill). Brand Name(s): Active-Medicated Specimen Collection Kit, Diuscreen Multi-Drug Medicated Test Kit, Lasix, RX-Specimen Collection Kit, Specimen Collection Kit There may be other brand names for this medicine. When This Medicine Should Not Be Used: This medicine is not right for everyone. Do not use it if you had an allergic reaction to furosemide. How to Use This Medicine:  
Liquid, Tablet · Take your medicine as directed. Your dose may need to be changed several times to find what works best for you. · You may take this medicine with food if it upsets your stomach. · Oral liquid: Measure the oral liquid medicine with a marked measuring spoon, oral syringe, or medicine cup. · Tablet: Swallow the tablet whole. Do not crush, break, or chew it. · Missed dose: Take a dose as soon as you remember. If it is almost time for your next dose, wait until then and take a regular dose. Do not take extra medicine to make up for a missed dose. · Store the medicine in a closed container at room temperature, away from heat, moisture, and direct light. Drugs and Foods to Avoid: Ask your doctor or pharmacist before using any other medicine, including over-the-counter medicines, vitamins, and herbal products. · Some medicines can affect how furosemide works. Tell your doctor if you are also using any of the following: ¨ Cisplatin, cyclosporine, digoxin, ethacrynic acid, licorice, lithium, methotrexate, or phenytoin ¨ Adrenocorticotropic hormone (ACTH) ¨ Laxative ¨ Medicine to treat an infection ¨ NSAID pain or arthritis medicine (including aspirin, diclofenac, ibuprofen, indomethacin, naproxen) ¨ Other blood pressure medicines ¨ Steroid medicine (including dexamethasone, hydrocortisone, methylprednisolone, prednisolone, prednisone) ¨ Thyroid medicine · If you also take sucralfate, allow at least 2 hours between the time you take furosemide and the time you take sucralfate. · Alcohol, narcotic pain medicine, or sleeping pills may cause you to feel more lightheaded, dizzy, or faint when used with this medicine. Warnings While Using This Medicine: · Tell your doctor if you are pregnant or breastfeeding, or if you have kidney disease, liver disease (including cirrhosis), diabetes, gout, low blood pressure, lupus, an enlarged prostate, trouble urinating, or an allergy to sulfa drugs. Tell your doctor if you are on a low-salt diet. · This medicine may cause the following problems:  
¨ Low levels of minerals in your blood, such as potassium and sodium ¨ Blood sugar level changes ¨ Hearing problems · Make sure any doctor or dentist who treats you knows that you are using this medicine. · This medicine could lower your blood pressure too much, especially when you first use it or if you are dehydrated. Stand or sit up slowly if you feel lightheaded or dizzy. · This medicine may make your skin more sensitive to sunlight. Wear sunscreen. Do not use sunlamps or tanning beds. · Your doctor will do lab tests at regular visits to check on the effects of this medicine. Keep all appointments. · Keep all medicine out of the reach of children. Never share your medicine with anyone. Possible Side Effects While Using This Medicine:  
Call your doctor right away if you notice any of these side effects: · Allergic reaction: Itching or hives, swelling in your face or hands, swelling or tingling in your mouth or throat, chest tightness, trouble breathing · Blistering, peeling, red skin rash · Confusion, weakness, muscle twitching · Dry mouth, increased thirst, muscle cramps, uneven heartbeat · Sudden and severe stomach pain, nausea, vomiting, fever, lightheadedness · Hearing loss, ringing in the ears · Lightheadedness, dizziness, fainting · Severe diarrhea · Unusual bleeding or bruising · Yellow skin or eyes If you notice these less serious side effects, talk with your doctor: · Loss of appetite, stomach cramps If you notice other side effects that you think are caused by this medicine, tell your doctor. Call your doctor for medical advice about side effects. You may report side effects to FDA at 0-317-FDA-6781 © 2017 [address] Information is for End User's use only and may not be sold, redistributed or otherwise used for commercial purposes. The above information is an  only. It is not intended as medical advice for individual conditions or treatments.  Talk to your doctor, nurse or pharmacist before following any medical regimen to see if it is safe and effective for you. Ceftriaxone (Rocephin, Novaplus cefTRIAXone, Amerinet Choice cefTRIAXone) - (By injection) Why this medicine is used:  
Treats infections. Contact a nurse or doctor right away if you have: · Blistering, peeling, red skin rash · Severe or bloody diarrhea · Loss of appetite, stomach pain, yellow skin or eyes · Dark urine or pale stools · Nausea, vomiting Common side effects: · Vaginal itching or discharge · Pain, redness, swelling where the needle is placed © 2017 [facility] Information is for End User's use only and may not be sold, redistributed or otherwise used for commercial purposes. Magnesium Oxide (By mouth) Magnesium Oxide (mag-NEE-zee-um OX-flower) Treats acid indigestion and upset stomach. Also used as a mineral supplement to add to the magnesium oxide in your daily diet. Brand Name(s): Sherrel D[employer] Naturals Magnesium, Uro-Mag There may be other brand names for this medicine. When This Medicine Should Not Be Used:  
Unless your doctor tells you otherwise, there is no reason for you to not use this medicine. How to Use This Medicine:  
· Your doctor will tell you how much medicine to use. Do not use more than directed. · Follow the instructions on the medicine label if you are using this medicine without a prescription. · If you are using this medicine as an antacid, do not use the medicine for longer than 2 weeks in a row. · Drink at least six to eight (8 ounce) cups of liquid each day, unless your doctor tells you otherwise. If a dose is missed: · Take a dose as soon as you remember. If it is almost time for your next dose, wait until then and take a regular dose. Do not take extra medicine to make up for a missed dose. How to Store and Dispose of This Medicine: · Store the medicine in a closed container at room temperature, away from heat, moisture, and direct light. · Keep all medicine out of the reach of children. Never share your medicine with anyone. · Ask your pharmacist, doctor, or health caregiver about the best way to dispose of any outdated medicine or medicine no longer needed. Drugs and Foods to Avoid: Ask your doctor or pharmacist before using any other medicine, including over-the-counter medicines, vitamins, and herbal products. · There are many other medicines that may not work properly if you use them together with magnesium oxide. Make sure your doctor knows about all other medicines you are using. Warnings While Using This Medicine: · Make sure your doctor knows if you are pregnant or breast feeding, or if you have kidney disease. · Tell your doctor if your acid indigestion does not improve after using the medicine for 1 to 2 weeks. Possible Side Effects While Using This Medicine: If you notice these less serious side effects, talk with your doctor: · Diarrhea. If you notice other side effects that you think are caused by this medicine, tell your doctor. Call your doctor for medical advice about side effects. You may report side effects to FDA at 9-240-FDA-8190 © 2017 [facility] Information is for End User's use only and may not be sold, redistributed or otherwise used for commercial purposes. The above information is an  only. It is not intended as medical advice for individual conditions or treatments. Talk to your doctor, nurse or pharmacist before following any medical regimen to see if it is safe and effective for you. Acetaminophen/Codeine (By mouth) Acetaminophen (i-xyzp-z-MIN-oh-fen), Codeine Phosphate (KOE-yessica FOS-fate) Treats mild to moderately severe pain. This medicine contains a narcotic pain reliever. Brand Name(s): Capital w/Codeine, Tylenol With Codeine No. 4, Tylenol w/Codeine #3, Tylenol with Codeine No. 3 There may be other brand names for this medicine. When This Medicine Should Not Be Used: This medicine is not right for everyone. Do not use it if you had an allergic reaction to acetaminophen or codeine, or to other narcotic medicines. How to Use This Medicine:  
Capsule, Liquid, Tablet · Your doctor will tell you how much medicine to use. Do not use more than directed. · You may take this medicine with food or milk if it upsets your stomach. · Measure the oral liquid medicine with a marked measuring spoon, oral syringe, or medicine cup. · Drink plenty of liquids to help avoid constipation. · Missed dose: Take a dose as soon as you remember. If it is almost time for your next dose, wait until then and take a regular dose. Do not take extra medicine to make up for a missed dose. · Store the medicine in a closed container at room temperature, away from heat, moisture, and direct light. Keep the oral liquid in the refrigerator. Do not freeze. Drugs and Foods to Avoid: Ask your doctor or pharmacist before using any other medicine, including over-the-counter medicines, vitamins, and herbal products. · Some medicines and foods can affect how this medicine works. Tell your doctor if you are taking any of the following: ¨ Depression medicine, such as citalopram, fluoxetine, sertraline ¨ Phenothiazine medicine, such as prochlorperazine ¨ Tranquilizer medicine, such as chlordiazepoxide · Do not drink alcohol while you are using this medicine. Acetaminophen can damage your liver, and alcohol can increase this risk. Do not take acetaminophen without asking your doctor if you have 3 or more drinks of alcohol every day. Warnings While Using This Medicine: · Tell your doctor if you are pregnant or breastfeeding, or if you have kidney disease, liver disease, adrenal problems (such as Galen disease), asthma, or breathing problems (such as respiratory depression, sleep apnea).  Tell your doctor if you have an enlarged prostate, trouble urinating, stomach problems, an underactive thyroid, or a history of head injury or brain damage. Tell your doctor if you had an allergic reaction to sulfites or if you have a history of drug or alcohol abuse. · This medicine can be habit-forming. Do not use more than your prescribed dose. Call your doctor if you think your medicine is not working. · This medicine contains acetaminophen. Read the labels of all other medicines you are using to see if they also contain acetaminophen, or ask your doctor or pharmacist. Isidro Cruz not use more than 4 grams (4,000 milligrams) total of acetaminophen in one day. · If you take this medicine for more than a few weeks, do not stop taking it suddenly. Your doctor will need to slowly decrease your dose before you stop it completely. · Get emergency help immediately if you think you may have taken too much of this medicine. Signs of an overdose include shallow breathing, fainting, confusion, nausea, vomiting, pinpoint pupils of the eyes, or pale or blue lips, fingernails, or skin. · This medicine may make you dizzy or drowsy. Do not drive or do anything that could be dangerous until you know how this medicine affects you. · Tell any doctor or dentist who treats you that you are using this medicine. This medicine may affect certain medical test results. · This medicine may cause constipation, especially with long-term use. Ask your doctor if you should use a laxative to prevent and treat constipation. · Keep all medicine out of the reach of children. Never share your medicine with anyone. Possible Side Effects While Using This Medicine:  
Call your doctor right away if you notice any of these side effects: · Allergic reaction: Itching or hives, swelling in your face or hands, swelling or tingling in your mouth or throat, chest tightness, trouble breathing · Dark urine or pale stools, nausea, vomiting, loss of appetite, stomach pain, yellow skin or eyes · Extreme drowsiness or confusion · Lightheadedness, dizziness, or fainting · Seizures · Trouble breathing, shallow breathing, blue lips or nails · Unusual bleeding, bruising, or weakness If you notice these less serious side effects, talk with your doctor: · Mild dizziness or drowsiness · Mild nausea or vomiting, constipation · Rash or itching skin If you notice other side effects that you think are caused by this medicine, tell your doctor. Call your doctor for medical advice about side effects. You may report side effects to FDA at 2-116-FDA-7001 © 2017 [address] Information is for End User's use only and may not be sold, redistributed or otherwise used for commercial purposes. The above information is an  only. It is not intended as medical advice for individual conditions or treatments. Talk to your doctor, nurse or pharmacist before following any medical regimen to see if it is safe and effective for you. Oxycodone/Acetaminophen (By mouth) Acetaminophen (h-guji-n-MIN-oh-fen), Oxycodone Hydrochloride (wa-z-OEP-done kim-droe-KLOR-flower) Treats moderate to moderately severe pain. This medicine is a narcotic pain reliever. Brand Name(s): Endocet, Percocet, Primlev, Roxicet, Xartemis XR There may be other brand names for this medicine. When This Medicine Should Not Be Used: This medicine is not right for everyone. Do not use it if you had an allergic reaction to acetaminophen or oxycodone, or if you have serious breathing problems or paralytic ileus. How to Use This Medicine:  
Capsule, Liquid, Tablet, Long Acting Tablet · Your doctor will tell you how much medicine to use. Do not use more than directed. · An overdose can be dangerous. Follow directions carefully so you do not get too much medicine at one time. · Oral liquid: Measure the oral liquid medicine with a marked measuring spoon, oral syringe, or medicine cup. · Swallow the extended-release tablet whole. Do not crush, break, or chew it. Do not lick or wet the tablet before placing it in your mouth. Do not give this medicine through a feeding tube. · This medicine should come with a Medication Guide. Ask your pharmacist for a copy if you do not have one. · Missed dose: If you miss a dose of this medicine, skip the missed dose and go back to your regular dosing schedule. Do not double doses. · Store the medicine in a closed container at room temperature, away from heat, moisture, and direct light. Ask your pharmacist about the best way to dispose of medicine you do not use. Drugs and Foods to Avoid: Ask your doctor or pharmacist before using any other medicine, including over-the-counter medicines, vitamins, and herbal products. · Do not use Xartemis XR if you are using or have used an MAO inhibitor in the past 14 days. · Some medicines can affect how this medicine works. Tell your doctor if you are using any of the following: ¨ Carbamazepine, erythromycin, ketoconazole, lamotrigine, mirtazapine, naltrexone, phenytoin, propranolol, rifampin, ritonavir, tramadol, trazodone, or zidovudine ¨ Birth control pills ¨ Diuretic (water pill) ¨ Medicine to treat depression ¨ Phenothiazine medicine ¨ Triptan medicine to treat migraine headaches · Do not drink alcohol while you are using this medicine. Acetaminophen can damage your liver, and alcohol can increase this risk. Do not take acetaminophen without asking your doctor if you have 3 or more drinks of alcohol every day. · Tell your doctor if you use anything else that makes you sleepy. Some examples are allergy medicine, narcotic pain medicine, and alcohol. Tell your doctor if you are using buprenorphine, butorphanol, nalbuphine, pentazocine, a benzodiazepine, or a muscle relaxer. Warnings While Using This Medicine: · Tell your doctor if you are pregnant or breastfeeding, or if you have kidney disease, liver disease, heart disease, low blood pressure, breathing problems or lung disease (such as asthma, COPD), thyroid problems, [place] disease, pancreas or gallbladder problems, prostate problems, trouble urinating, or a stomach problems, or a history of head injury or brain damage, seizures, or alcohol or drug abuse. Tell your doctor if you are allergic to codeine. · This medicine may cause the following problems: 
¨ High risk of overdose, which can lead to death ¨ Respiratory depression (serious breathing problem that can be life-threatening) ¨ Liver problems ¨ Serious skin reactions ¨ Serotonin syndrome (when used with certain medicines) · This medicine may make you dizzy or drowsy. Do not drive or do anything that could be dangerous until you know how this medicine affects you. Sit or lie down if you feel dizzy. Stand up carefully. · This medicine contains acetaminophen. Read the labels of all other medicines you are using to see if they also contain acetaminophen, or ask your doctor or pharmacist. Lluvia Emanuel not use more than 4 grams (4,000 milligrams) total of acetaminophen in one day. · This medicine can be habit-forming. Do not use more than your prescribed dose. Call your doctor if you think your medicine is not working. · Do not stop using this medicine suddenly. Your doctor will need to slowly decrease your dose before you stop it completely. · This medicine could cause infertility. Talk with your doctor before using this medicine if you plan to have children. · This medicine may cause constipation, especially with long-term use. Ask your doctor if you should use a laxative to prevent and treat constipation. · Keep all medicine out of the reach of children. Never share your medicine with anyone. Possible Side Effects While Using This Medicine:  
Call your doctor right away if you notice any of these side effects: · Allergic reaction: Itching or hives, swelling in your face or hands, swelling or tingling in your mouth or throat, chest tightness, trouble breathing · Anxiety, restlessness, fast heartbeat, fever, muscle spasms, twitching, diarrhea, seeing or hearing things that are not there · Blistering, peeling, red skin rash · Blue lips, fingernails, or skin · Dark urine or pale stools, loss of appetite, stomach pain, yellow skin or eyes · Extreme weakness, shallow breathing, uneven heartbeat, seizures, sweating, or cold or clammy skin · Severe confusion, lightheadedness, dizziness, or fainting · Severe constipation, nausea, or vomiting · Trouble breathing or slow breathing If you notice these less serious side effects, talk with your doctor:  
· Headache · Mild constipation, nausea, or vomiting · Mild sleepiness or drowsiness If you notice other side effects that you think are caused by this medicine, tell your doctor. Call your doctor for medical advice about side effects. You may report side effects to FDA at 8-984-FDA-7641 © 2017 [address]  Information is for End User's use only and may not be sold, redistributed or otherwise used for commercial purposes. The above information is an  only. It is not intended as medical advice for individual conditions or treatments. Talk to your doctor, nurse or pharmacist before following any medical regimen to see if it is safe and effective for you. Gabapentin (By mouth) Gabapentin (navi-a-PEN-tin) Treats seizures and pain caused by shingles. Brand Name(s): ACTIVE-PAC with Gabapentin, Convenience Eugenio, Cyclo/Rica 10/300 Pack, FusePaq Fanatrex, Rica-V, Gralise, Neurontin, [place] There may be other brand names for this medicine. When This Medicine Should Not Be Used: This medicine is not right for everyone. Do not use it if you had an allergic reaction to gabapentin. How to Use This Medicine:  
Capsule, Liquid, Tablet · Take your medicine as directed. Your dose may need to be changed several times to find what works best for you. If you have epilepsy, do not allow more than 12 hours to pass between doses. · Capsule: Swallow the capsule whole with plenty of water. Do not open, crush, or chew it. · Gralise® tablet: Swallow the tablet whole . Do not crush, break, or chew it. · Neurontin® tablet: If you break a tablet into 2 pieces, use the second half as your next dose. If you don't use it within 28 days, throw it away. · Measure the oral liquid medicine with a marked measuring spoon, oral syringe, or medicine cup. · This medicine should come with a Medication Guide. Ask your pharmacist for a copy if you do not have one. · Missed dose: Take a dose as soon as you remember. If it is almost time for your next dose, wait until then and take a regular dose. Do not take extra medicine to make up for a missed dose. · Store the medicine in a closed container at room temperature, away from heat, moisture, and direct light. Store the Neurontin® oral liquid in the refrigerator. Do not freeze. Drugs and Foods to Avoid: Ask your doctor or pharmacist before using any other medicine, including over-the-counter medicines, vitamins, and herbal products. · Some medicines can affect how gabapentin works. Tell your doctor if you also use any of the following: ¨ Hydrocodone ¨ Morphine · If you take an antacid, wait at least 2 hours before you take gabapentin. · Tell your doctor if you use anything else that makes you sleepy. Some examples are allergy medicine, narcotic pain medicine, and alcohol. Warnings While Using This Medicine: · Tell your doctor if you are pregnant or breastfeeding, or if you have kidney problems or are receiving dialysis. Tell your doctor if you have a history of depression or mental health problems. · This medicine may increase depression or thoughts of suicide.  Tell your doctor right away if you start to feel more depressed or think about hurting yourself. · This medicine may cause a serious allergic reaction called multiorgan hypersensitivity, which can damage organs and be life-threatening. · Do not stop using this medicine suddenly. Your doctor will need to slowly decrease your dose before you stop it completely. If you take this medicine to prevent seizures, your seizures may return or occur more often if you stop this medicine suddenly. · This medicine may make you dizzy or drowsy. Do not drive or do anything else that could be dangerous until you know how this medicine affects you. · Tell any doctor or dentist who treats you that you are using this medicine. This medicine may affect certain medical test results. · Your doctor will check your progress and the effects of this medicine at regular visits. Keep all appointments. · Keep all medicine out of the reach of children. Never share your medicine with anyone. Possible Side Effects While Using This Medicine:  
Call your doctor right away if you notice any of these side effects: · Allergic reaction: Itching or hives, swelling in your face or hands, swelling or tingling in your mouth or throat, chest tightness, trouble breathing · Behavior problems, aggression, restlessness, trouble concentrating, moodiness (especially in children) · Blistering, peeling, red skin rash · Change in how much or how often you urinate, bloody or cloudy urine, 
· Chest pain, fast heartbeat, trouble breathing · Dark urine or pale stools, nausea, vomiting, loss of appetite, stomach pain, yellow skin or eyes · Fever, rash, swollen or tender glands in the neck, armpit, or groin · Problems with coordination, shakiness, unsteadiness · Rapid weight gain, swelling in your hands, ankles, or feet · Unusual moods or behaviors, thoughts of hurting yourself, feeling depressed If you notice these less serious side effects, talk with your doctor: · Dizziness, drowsiness, sleepiness, tiredness If you notice other side effects that you think are caused by this medicine, tell your doctor. Call your doctor for medical advice about side effects. You may report side effects to FDA at 2-359-FDA-1384 © 2017 [address] Information is for End User's use only and may not be sold, redistributed or otherwise used for commercial purposes. The above information is an  only. It is not intended as medical advice for individual conditions or treatments. Talk to your doctor, nurse or pharmacist before following any medical regimen to see if it is safe and effective for you. Antihistamine/Decongestant (By mouth) Treats stuffy nose, sneezing, runny nose, and sinus congestion caused by hay fever, colds, or flu. Brand Name(s): Acticon, Actifed Cold & Allergy, Ala-Hist PE, Aldex-CT, All Day Allergy-D, Allegra-D, Allegra-D 12 Hour, Allegra-D 12 Hour Allergy & Congestion, Allegra-D 24 Hour Allergy & Congestion, Allegra-D 24Hour, Allerest Maximum Strength, Aprodine, Benadryl-D Allergy Plus Sinus, Bromax D, Brotapp There may be other brand names for this medicine. When This Medicine Should Not Be Used: You should not use this medicine if you have had an allergic reaction to any antihistamine or decongestant, or if you have used an MAO inhibitor (such as Nardil®, Marplan®, or Parnate®) within the past 14 days. Do not give any over-the-counter (OTC) cough and cold medicine to a baby or child under 3years old. Using these medicines in very young children might cause serious or possibly life-threatening side effects. How to Use This Medicine: Thin Sheet, Capsule, Long Acting Capsule, Tablet, Long Acting Tablet, Chewable Tablet, 24 Hour Tablet, Dissolving Tablet, Liquid, Drop · Your doctor will tell you how much medicine to use. Do not use more than directed.  
· Follow the instructions on the medicine label if you are using this medicine without a prescription. · Completely chew the chewable tablet before swallowing it. Swallow the regular tablet, regular capsule, extended-release tablet, or extended-release capsule whole. Do not crush, break, or chew it. · If you use a powder, stir the medicine into water and drink it right away. Do not keep any unused mixture to take later. · If you are using the Softchew®, you may let it melt in your mouth, or you may chew it. If you are using the dissolving tablet, let the tablet melt in your mouth. · Measure the oral liquid medicine with a marked measuring spoon, medicine cup, oral syringe, or medicine dropper. · You might need to shake the oral liquid well just before you use it. Follow the directions on the label or ask your pharmacist. 
If a dose is missed: · If you miss a dose or forget to take your medicine, take it as soon as you can. If it is almost time for your next dose, wait until then to take the medicine and skip the missed dose. · Do not use extra medicine to make up for a missed dose. How to Store and Dispose of This Medicine: · Store the medicine at room temperature, away from heat, moisture, and direct light. Do not freeze. · Ask your pharmacist, doctor, or health caregiver about the best way to dispose of any outdated medicine or medicine no longer needed. · Keep all medicine away from children and never share your medicine with anyone. Drugs and Foods to Avoid: Ask your doctor or pharmacist before using any other medicine, including over-the-counter medicines, vitamins, and herbal products. · Avoid drinking alcohol or using any other medicines that make you sleepy. These include sleeping pills, other cold and allergy medicine, narcotic pain relievers, and sedatives. Warnings While Using This Medicine: · Make sure your doctor knows if you are pregnant or breastfeeding, or if you have heart disease, high blood pressure, diabetes, liver disease, glaucoma, asthma, emphysema, seizure disorder, a disorder of the urinary tract, or an overactive thyroid. · If your symptoms do not improve within 7 days or if they get worse, call your doctor. If you have a severe sore throat, fever, or thick yellow or green mucus, call your doctor. · Some brands of this medicine may contain phenylalanine (aspartame). If you have a health problem called phenylketonuria, ask your doctor before using this medicine. · This medicine can make you drowsy or restless. Avoid taking at bedtime if it makes you restless. Avoid driving, using machines, or doing anything else that could be dangerous if you are not alert. · Some brands of this medicine may contain alcohol. Read the label carefully or ask your pharmacist so you know what is in your product. · Children may be more sensitive to this medicine than adults, especially if they take too much. Always read the medicine label closely so you give your child the right amount. Ask your pharmacist or doctor if you are not sure how much medicine to give your child. Possible Side Effects While Using This Medicine:  
Call your doctor right away if you notice any of these side effects: 
· Fast, pounding, or irregular heartbeat · Severe headache · Skin rash, hives, or itching · Trouble breathing If you notice these less serious side effects, talk with your doctor: · Dry mouth, nose, or throat · Nausea · Trouble urinating If you notice other side effects that you think are caused by this medicine, tell your doctor. Call your doctor for medical advice about side effects. You may report side effects to FDA at 3-203-FDA-8435 © 2017 [facility] Information is for End User's use only and may not be sold, redistributed or otherwise used for commercial purposes. The above information is an  only. It is not intended as medical advice for individual conditions or treatments.  Talk to your doctor, nurse or pharmacist before following any medical regimen to see if it is safe and effective for you.

## 2017-06-07 ENCOUNTER — APPOINTMENT (OUTPATIENT)
Dept: CT IMAGING | Age: 67
DRG: 372 | End: 2017-06-07
Attending: FAMILY MEDICINE
Payer: COMMERCIAL

## 2017-06-07 LAB
ALBUMIN SERPL BCP-MCNC: 1.9 G/DL (ref 3.4–5)
ALBUMIN/GLOB SERPL: 0.5 {RATIO} (ref 0.8–1.7)
ALP SERPL-CCNC: 199 U/L (ref 45–117)
ALT SERPL-CCNC: 30 U/L (ref 13–56)
ANION GAP BLD CALC-SCNC: 8 MMOL/L (ref 3–18)
AST SERPL W P-5'-P-CCNC: 43 U/L (ref 15–37)
BASOPHILS # BLD AUTO: 0 K/UL (ref 0–0.1)
BASOPHILS # BLD: 0 % (ref 0–2)
BILIRUB SERPL-MCNC: 0.5 MG/DL (ref 0.2–1)
BUN SERPL-MCNC: 13 MG/DL (ref 7–18)
BUN/CREAT SERPL: 18 (ref 12–20)
CALCIUM SERPL-MCNC: 8.4 MG/DL (ref 8.5–10.1)
CHLORIDE SERPL-SCNC: 100 MMOL/L (ref 100–108)
CO2 SERPL-SCNC: 29 MMOL/L (ref 21–32)
CREAT SERPL-MCNC: 0.71 MG/DL (ref 0.6–1.3)
DIFFERENTIAL METHOD BLD: ABNORMAL
EOSINOPHIL # BLD: 0.1 K/UL (ref 0–0.4)
EOSINOPHIL NFR BLD: 2 % (ref 0–5)
ERYTHROCYTE [DISTWIDTH] IN BLOOD BY AUTOMATED COUNT: 16.2 % (ref 11.6–14.5)
GLOBULIN SER CALC-MCNC: 4.2 G/DL (ref 2–4)
GLUCOSE BLD STRIP.AUTO-MCNC: 167 MG/DL (ref 70–110)
GLUCOSE BLD STRIP.AUTO-MCNC: 175 MG/DL (ref 70–110)
GLUCOSE BLD STRIP.AUTO-MCNC: 200 MG/DL (ref 70–110)
GLUCOSE BLD STRIP.AUTO-MCNC: 203 MG/DL (ref 70–110)
GLUCOSE BLD STRIP.AUTO-MCNC: 246 MG/DL (ref 70–110)
GLUCOSE SERPL-MCNC: 160 MG/DL (ref 74–99)
HCT VFR BLD AUTO: 25.8 % (ref 35–45)
HGB BLD-MCNC: 8.8 G/DL (ref 12–16)
LYMPHOCYTES # BLD AUTO: 26 % (ref 21–52)
LYMPHOCYTES # BLD: 1.9 K/UL (ref 0.9–3.6)
MCH RBC QN AUTO: 27 PG (ref 24–34)
MCHC RBC AUTO-ENTMCNC: 34.1 G/DL (ref 31–37)
MCV RBC AUTO: 79.1 FL (ref 74–97)
MONOCYTES # BLD: 0.8 K/UL (ref 0.05–1.2)
MONOCYTES NFR BLD AUTO: 11 % (ref 3–10)
NEUTS SEG # BLD: 4.4 K/UL (ref 1.8–8)
NEUTS SEG NFR BLD AUTO: 61 % (ref 40–73)
PLATELET # BLD AUTO: 249 K/UL (ref 135–420)
PMV BLD AUTO: 8.8 FL (ref 9.2–11.8)
POTASSIUM SERPL-SCNC: 3.2 MMOL/L (ref 3.5–5.5)
PROT SERPL-MCNC: 6.1 G/DL (ref 6.4–8.2)
RBC # BLD AUTO: 3.26 M/UL (ref 4.2–5.3)
SODIUM SERPL-SCNC: 137 MMOL/L (ref 136–145)
WBC # BLD AUTO: 7.2 K/UL (ref 4.6–13.2)

## 2017-06-07 PROCEDURE — 65660000000 HC RM CCU STEPDOWN

## 2017-06-07 PROCEDURE — 36430 TRANSFUSION BLD/BLD COMPNT: CPT

## 2017-06-07 PROCEDURE — 74011000258 HC RX REV CODE- 258: Performed by: FAMILY MEDICINE

## 2017-06-07 PROCEDURE — 82962 GLUCOSE BLOOD TEST: CPT

## 2017-06-07 PROCEDURE — 85025 COMPLETE CBC W/AUTO DIFF WBC: CPT | Performed by: FAMILY MEDICINE

## 2017-06-07 PROCEDURE — 36415 COLL VENOUS BLD VENIPUNCTURE: CPT | Performed by: FAMILY MEDICINE

## 2017-06-07 PROCEDURE — 74011250636 HC RX REV CODE- 250/636: Performed by: FAMILY MEDICINE

## 2017-06-07 PROCEDURE — 74011250637 HC RX REV CODE- 250/637: Performed by: FAMILY MEDICINE

## 2017-06-07 PROCEDURE — 74011000250 HC RX REV CODE- 250: Performed by: FAMILY MEDICINE

## 2017-06-07 PROCEDURE — 74011636637 HC RX REV CODE- 636/637: Performed by: FAMILY MEDICINE

## 2017-06-07 PROCEDURE — P9016 RBC LEUKOCYTES REDUCED: HCPCS | Performed by: EMERGENCY MEDICINE

## 2017-06-07 PROCEDURE — 80053 COMPREHEN METABOLIC PANEL: CPT | Performed by: FAMILY MEDICINE

## 2017-06-07 PROCEDURE — 94640 AIRWAY INHALATION TREATMENT: CPT

## 2017-06-07 PROCEDURE — 74176 CT ABD & PELVIS W/O CONTRAST: CPT

## 2017-06-07 RX ORDER — ACETAMINOPHEN 325 MG/1
650 TABLET ORAL
Status: DISCONTINUED | OUTPATIENT
Start: 2017-06-07 | End: 2017-06-22 | Stop reason: HOSPADM

## 2017-06-07 RX ORDER — MIDODRINE HYDROCHLORIDE 2.5 MG/1
2.5 TABLET ORAL
Status: DISCONTINUED | OUTPATIENT
Start: 2017-06-07 | End: 2017-06-22 | Stop reason: HOSPADM

## 2017-06-07 RX ORDER — LANOLIN ALCOHOL/MO/W.PET/CERES
400 CREAM (GRAM) TOPICAL 2 TIMES DAILY
Status: DISCONTINUED | OUTPATIENT
Start: 2017-06-07 | End: 2017-06-22 | Stop reason: HOSPADM

## 2017-06-07 RX ORDER — PRAVASTATIN SODIUM 20 MG/1
40 TABLET ORAL
Status: DISCONTINUED | OUTPATIENT
Start: 2017-06-07 | End: 2017-06-07

## 2017-06-07 RX ORDER — IPRATROPIUM BROMIDE AND ALBUTEROL SULFATE 2.5; .5 MG/3ML; MG/3ML
3 SOLUTION RESPIRATORY (INHALATION)
Status: DISCONTINUED | OUTPATIENT
Start: 2017-06-07 | End: 2017-06-22 | Stop reason: HOSPADM

## 2017-06-07 RX ORDER — SODIUM CHLORIDE 0.9 % (FLUSH) 0.9 %
5-10 SYRINGE (ML) INJECTION EVERY 8 HOURS
Status: DISCONTINUED | OUTPATIENT
Start: 2017-06-07 | End: 2017-06-22 | Stop reason: HOSPADM

## 2017-06-07 RX ORDER — ALLOPURINOL 100 MG/1
50 TABLET ORAL DAILY
Status: DISCONTINUED | OUTPATIENT
Start: 2017-06-07 | End: 2017-06-22 | Stop reason: HOSPADM

## 2017-06-07 RX ORDER — SODIUM CHLORIDE 0.9 % (FLUSH) 0.9 %
5-10 SYRINGE (ML) INJECTION AS NEEDED
Status: DISCONTINUED | OUTPATIENT
Start: 2017-06-07 | End: 2017-06-18

## 2017-06-07 RX ORDER — DEXTROSE 50 % IN WATER (D50W) INTRAVENOUS SYRINGE
25-50 AS NEEDED
Status: DISCONTINUED | OUTPATIENT
Start: 2017-06-07 | End: 2017-06-22 | Stop reason: HOSPADM

## 2017-06-07 RX ORDER — POLYETHYLENE GLYCOL 3350 17 G/17G
17 POWDER, FOR SOLUTION ORAL 2 TIMES DAILY
Status: DISCONTINUED | OUTPATIENT
Start: 2017-06-07 | End: 2017-06-22 | Stop reason: HOSPADM

## 2017-06-07 RX ORDER — FUROSEMIDE 40 MG/1
40 TABLET ORAL DAILY
Status: DISCONTINUED | OUTPATIENT
Start: 2017-06-08 | End: 2017-06-09

## 2017-06-07 RX ORDER — FAMOTIDINE 20 MG/1
20 TABLET, FILM COATED ORAL
Status: DISCONTINUED | OUTPATIENT
Start: 2017-06-07 | End: 2017-06-22 | Stop reason: HOSPADM

## 2017-06-07 RX ORDER — ONDANSETRON 4 MG/1
4 TABLET, ORALLY DISINTEGRATING ORAL
Status: DISCONTINUED | OUTPATIENT
Start: 2017-06-07 | End: 2017-06-22 | Stop reason: HOSPADM

## 2017-06-07 RX ORDER — ACETAMINOPHEN 325 MG/1
650 TABLET ORAL
Status: DISCONTINUED | OUTPATIENT
Start: 2017-06-07 | End: 2017-06-07 | Stop reason: SDUPTHER

## 2017-06-07 RX ORDER — LANOLIN ALCOHOL/MO/W.PET/CERES
325 CREAM (GRAM) TOPICAL 2 TIMES DAILY WITH MEALS
Status: DISCONTINUED | OUTPATIENT
Start: 2017-06-07 | End: 2017-06-22 | Stop reason: HOSPADM

## 2017-06-07 RX ORDER — FOLIC ACID 1 MG/1
1 TABLET ORAL DAILY
Status: DISCONTINUED | OUTPATIENT
Start: 2017-06-07 | End: 2017-06-22 | Stop reason: HOSPADM

## 2017-06-07 RX ORDER — OXYCODONE AND ACETAMINOPHEN 7.5; 325 MG/1; MG/1
1 TABLET ORAL
Status: DISCONTINUED | OUTPATIENT
Start: 2017-06-07 | End: 2017-06-22 | Stop reason: HOSPADM

## 2017-06-07 RX ORDER — DIPHENHYDRAMINE HCL 25 MG
25 CAPSULE ORAL 2 TIMES DAILY
Status: DISCONTINUED | OUTPATIENT
Start: 2017-06-07 | End: 2017-06-22 | Stop reason: HOSPADM

## 2017-06-07 RX ORDER — IPRATROPIUM BROMIDE AND ALBUTEROL SULFATE 2.5; .5 MG/3ML; MG/3ML
3 SOLUTION RESPIRATORY (INHALATION)
Status: DISCONTINUED | OUTPATIENT
Start: 2017-06-07 | End: 2017-06-07

## 2017-06-07 RX ORDER — AMOXICILLIN 250 MG
2 CAPSULE ORAL
Status: DISCONTINUED | OUTPATIENT
Start: 2017-06-07 | End: 2017-06-22 | Stop reason: HOSPADM

## 2017-06-07 RX ORDER — IPRATROPIUM BROMIDE AND ALBUTEROL SULFATE 2.5; .5 MG/3ML; MG/3ML
3 SOLUTION RESPIRATORY (INHALATION) 4 TIMES DAILY
Status: DISCONTINUED | OUTPATIENT
Start: 2017-06-07 | End: 2017-06-07

## 2017-06-07 RX ORDER — POTASSIUM CHLORIDE 7.45 MG/ML
10 INJECTION INTRAVENOUS
Status: COMPLETED | OUTPATIENT
Start: 2017-06-07 | End: 2017-06-12

## 2017-06-07 RX ORDER — UREA 10 %
1 LOTION (ML) TOPICAL 2 TIMES DAILY
Status: DISCONTINUED | OUTPATIENT
Start: 2017-06-07 | End: 2017-06-22 | Stop reason: HOSPADM

## 2017-06-07 RX ORDER — INSULIN GLARGINE 100 [IU]/ML
5 INJECTION, SOLUTION SUBCUTANEOUS
Status: DISCONTINUED | OUTPATIENT
Start: 2017-06-07 | End: 2017-06-22 | Stop reason: HOSPADM

## 2017-06-07 RX ORDER — MELATONIN
2000 DAILY
Status: DISCONTINUED | OUTPATIENT
Start: 2017-06-07 | End: 2017-06-22 | Stop reason: HOSPADM

## 2017-06-07 RX ORDER — INSULIN LISPRO 100 [IU]/ML
INJECTION, SOLUTION INTRAVENOUS; SUBCUTANEOUS
Status: DISCONTINUED | OUTPATIENT
Start: 2017-06-07 | End: 2017-06-07 | Stop reason: SDUPTHER

## 2017-06-07 RX ORDER — MAGNESIUM SULFATE 100 %
16 CRYSTALS MISCELLANEOUS AS NEEDED
Status: DISCONTINUED | OUTPATIENT
Start: 2017-06-07 | End: 2017-06-22 | Stop reason: HOSPADM

## 2017-06-07 RX ORDER — INSULIN LISPRO 100 [IU]/ML
INJECTION, SOLUTION INTRAVENOUS; SUBCUTANEOUS
Status: DISCONTINUED | OUTPATIENT
Start: 2017-06-07 | End: 2017-06-22 | Stop reason: HOSPADM

## 2017-06-07 RX ADMIN — VITAMIN D, TAB 1000IU (100/BT) 2000 UNITS: 25 TAB at 17:34

## 2017-06-07 RX ADMIN — POTASSIUM CHLORIDE 10 MEQ: 10 INJECTION, SOLUTION INTRAVENOUS at 15:40

## 2017-06-07 RX ADMIN — DIPHENHYDRAMINE HYDROCHLORIDE 25 MG: 25 CAPSULE ORAL at 17:23

## 2017-06-07 RX ADMIN — ACETAMINOPHEN 650 MG: 325 TABLET ORAL at 02:32

## 2017-06-07 RX ADMIN — Medication 10 ML: at 02:06

## 2017-06-07 RX ADMIN — LACTOBACILLUS TAB 1 TABLET: TAB at 17:24

## 2017-06-07 RX ADMIN — FERROUS SULFATE TAB 325 MG (65 MG ELEMENTAL FE) 325 MG: 325 (65 FE) TAB at 10:32

## 2017-06-07 RX ADMIN — FOLIC ACID 1 MG: 1 TABLET ORAL at 10:31

## 2017-06-07 RX ADMIN — CEFTRIAXONE 2 G: 2 INJECTION, POWDER, FOR SOLUTION INTRAMUSCULAR; INTRAVENOUS at 05:20

## 2017-06-07 RX ADMIN — INSULIN LISPRO 2 UNITS: 100 INJECTION, SOLUTION INTRAVENOUS; SUBCUTANEOUS at 23:03

## 2017-06-07 RX ADMIN — ALLOPURINOL 50 MG: 100 TABLET ORAL at 10:31

## 2017-06-07 RX ADMIN — POTASSIUM CHLORIDE 10 MEQ: 10 INJECTION, SOLUTION INTRAVENOUS at 22:55

## 2017-06-07 RX ADMIN — IPRATROPIUM BROMIDE AND ALBUTEROL SULFATE 3 ML: .5; 3 SOLUTION RESPIRATORY (INHALATION) at 09:05

## 2017-06-07 RX ADMIN — FAMOTIDINE 20 MG: 20 TABLET ORAL at 23:01

## 2017-06-07 RX ADMIN — MIDODRINE HYDROCHLORIDE 2.5 MG: 2.5 TABLET ORAL at 10:31

## 2017-06-07 RX ADMIN — INSULIN GLARGINE 5 UNITS: 100 INJECTION, SOLUTION SUBCUTANEOUS at 02:05

## 2017-06-07 RX ADMIN — LACTOBACILLUS TAB 1 TABLET: TAB at 10:31

## 2017-06-07 RX ADMIN — INSULIN GLARGINE 5 UNITS: 100 INJECTION, SOLUTION SUBCUTANEOUS at 22:00

## 2017-06-07 RX ADMIN — Medication 10 ML: at 05:33

## 2017-06-07 RX ADMIN — Medication 10 ML: at 23:07

## 2017-06-07 RX ADMIN — Medication 10 ML: at 15:39

## 2017-06-07 RX ADMIN — INSULIN LISPRO 4 UNITS: 100 INJECTION, SOLUTION INTRAVENOUS; SUBCUTANEOUS at 17:20

## 2017-06-07 RX ADMIN — ACETAMINOPHEN 650 MG: 325 TABLET ORAL at 15:47

## 2017-06-07 RX ADMIN — FERROUS SULFATE TAB 325 MG (65 MG ELEMENTAL FE) 325 MG: 325 (65 FE) TAB at 17:23

## 2017-06-07 NOTE — DIABETES MGMT
GLYCEMIC CONTROL SCREENING INITIATED:    Lab Results   Component Value Date/Time    Hemoglobin A1c 8.0 03/30/2017 11:26 AM      Lab Results   Component Value Date/Time    Glucose 160 06/07/2017 05:51 AM         [x]  Recommend corrective insulin per IP Insulin order set. [x]  Standard insulin scale []  Very insuln resistant scale      [x]  Recommend carbohydrate controlled diabetic diet.     Chelsey Pierce RD, CDE

## 2017-06-07 NOTE — PROGRESS NOTES
met with Theo Davalos, who is a 77 y.o.,female to assist in the completion of a [address]. Patients Primary Language is: [place]. The reason the Patient was admitted to is:   Patient Active Problem List    Diagnosis Date Noted    Hypervolemia 06/06/2017    Anemia 06/06/2017    Acute paraplegia ([address] 04/20/2017    Sepsis ([address] 04/20/2017    Psoas abscess, right ([address] 04/20/2017    Krueger catheter in place on admission 04/20/2017    Urinary tract infection due to Enterococcus 04/20/2017    Group B streptococcal infection 04/20/2017    Gout     History of Coumadin therapy     Decreased calculated glomerular filtration rate (GFR) 03/30/2017    History of pyelonephritis 03/30/2017    Iliopsoas muscle hematoma 03/30/2017    Psoas hematoma, right, secondary to anticoagulant therapy 03/30/2017    Impaired mobility and ADLs 03/30/2017    Obesity (BMI 35.0-39.9 without comorbidity) ([address] 03/13/2017    Chronic systolic heart failure ([address]     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    Benign hypertensive heart disease with systolic CHF, NYHA class 2 ([address] 09/05/2012    Chronic anemia 09/05/2012    History of deep venous thrombosis 09/05/2012    Anticoagulated on Coumadin 09/05/2012    Difficult airway for intubation 08/22/2012    AICD generator infection ([address] 08/20/2012    Obstructive sleep apnea on CPAP 02/07/2012    Nonischemic cardiomyopathy ([address] 06/28/2011    History of complete heart block 06/28/2011    Left bundle branch block (LBBB) on electrocardiogram 06/28/2011    Type 2 diabetes mellitus with diabetic neuropathy ([address] 06/28/2011    Dyslipidemia 06/28/2011    Diabetic neuropathy associated with type 2 diabetes mellitus ([address] 06/28/2011    Biventricular implantable cardioverter-defibrillator in situ 04/28/2005        The  provided the following Interventions:  Chart reviewed. Initiated a relationship of care and support. Explored mental status and with the patient's Nurse (*) who confirmed that the Patient was not on any medications that would effect the patients ability to, nor would the patient's mental status effect the ability to execute an Advance Medical Directive. Questioned patient as to date, location, and name. Provided education on the [address]. Offered prayer and assurance of continued prayers on patients behalf. Placed a copy of the executed [address] in the patients paper chart and returned the original to the patient.  conducted an initial consultation and Spiritual Assessment for Gilberto Merritt, who is a 77 y.o.,female. Patients Primary Language is: [place]. According to the patients EMR Christian Affiliation is: [place].      The reason the Patient came to the hospital is:   Patient Active Problem List    Diagnosis Date Noted    Hypervolemia 06/06/2017    Anemia 06/06/2017    Acute paraplegia ([address] 04/20/2017    Sepsis ([address] 04/20/2017    Psoas abscess, right ([address] 04/20/2017    Krueger catheter in place on admission 04/20/2017    Urinary tract infection due to Enterococcus 04/20/2017    Group B streptococcal infection 04/20/2017    Gout     History of Coumadin therapy     Decreased calculated glomerular filtration rate (GFR) 03/30/2017    History of pyelonephritis 03/30/2017    Iliopsoas muscle hematoma 03/30/2017    Psoas hematoma, right, secondary to anticoagulant therapy 03/30/2017    Impaired mobility and ADLs 03/30/2017    Obesity (BMI 35.0-39.9 without comorbidity) ([address] 03/13/2017    Chronic systolic heart failure ([address]     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    Benign hypertensive heart disease with systolic CHF, NYHA class 2 ([address] 09/05/2012    Chronic anemia 09/05/2012    History of deep venous thrombosis 09/05/2012    Anticoagulated on Coumadin 09/05/2012    Difficult airway for intubation 08/22/2012    AICD generator infection ([facility] 08/20/2012    Obstructive sleep apnea on CPAP 02/07/2012    Nonischemic cardiomyopathy ([facility] 06/28/2011    History of complete heart block 06/28/2011    Left bundle branch block (LBBB) on electrocardiogram 06/28/2011    Type 2 diabetes mellitus with diabetic neuropathy ([facility] 06/28/2011    Dyslipidemia 06/28/2011    Diabetic neuropathy associated with type 2 diabetes mellitus ([facility] 06/28/2011    Biventricular implantable cardioverter-defibrillator in situ 04/28/2005        The  provided the following Interventions:  Initiated a relationship of care and support. Explored issues of braulio, belief, spirituality and Pentecostalism/ritual needs while hospitalized. Listened empathically. Provided chaplaincy education. Provided information about Spiritual Care Services. Offered prayer and assurance of continued prayers on patient's behalf. Chart reviewed. The following outcomes where achieved:  Patient shared limited information about both their medical narrative and spiritual journey/beliefs.  confirmed Patient's Anabaptism Affiliation. Patient processed feeling about current hospitalization. Patient expressed gratitude for 's visit. Assessment:  Patient does not have any Pentecostalism/cultural needs that will affect patients preferences in health care. There are no spiritual or Pentecostalism issues which require intervention at this time. Plan:  Chaplains will continue to follow and will provide pastoral care on an as needed/requested basis.  recommends bedside caregivers page  on duty if patient shows signs of acute spiritual or emotional distress. Chaplain Kathy FERNANDEZ  [address] [place] [address]  [phone]

## 2017-06-07 NOTE — ROUTINE PROCESS
[phone] [facility]  Received  Telephone report from Gonzalo Johnson RN . Report includees thee following information, Pt condition, SBAR, Kardex, Procedure Summary, Intake/Output, MAR, Recent Lab Results. Will resume care and monitor Pt. Condition as soon as Pt. Is in designated room. Received  Pt. From ED per stretcher , Pt. Tolerated transfer from stretcher to bed well. Pt. Educated on call bell when in need of help and assistance. .Pt. Educated on MD's  orders and plans for the evening. Pt. Verbalized understanding. Will continue to monitor Pt. Condition. Pt. Head to toe Assessment Done and documented. Unit 1 of blood transfusion on going. Pt. Denies any sign of blood transfusion reaction. 0140  Blood transfusion 1 of 2 completed. 0159  Blood Transfusion 2 of 2 units started. Pt. Educated on blood transfusion reaction. 7531 Notified Dr Any Cheney of Pt. Temp 100.9  MD ordered to give tylenol per MAR.    0330  Temp 99.    0518  Blood transfusion 2/2 completed. Pt denies chills and BT reation. 0600  Pt. Able to rest in between care.

## 2017-06-07 NOTE — PROGRESS NOTES
NUTRITION    BPA/MST Referral       RECOMMENDATIONS / PLAN:     - Change to diabetic diet. - Continue RD inpatient monitoring and evaluation. NUTRITION INTERVENTIONS & DIAGNOSIS:     [x] Meals/Snacks: modified diet    Nutrition Diagnosis: Altered nutrition related laboratory value related to history of diabetes and excessive carbohydrate intake as evidenced by hyperglycemia, blood glucose up to 246 mg/dL today. ASSESSMENT:     Good appetite, tolerating diet and consuming most of meals. Average po intake adequate to meet patients estimated nutritional needs:   [x] Yes     [] No   [] Unable to determine at this time    Diet: DIET REGULAR  DIET NPO Except Meds      Food Allergies: blueberry, shellfish   Current Appetite:   [x] Good     [] Fair     [] Poor     [] Other:  Appetite/meal intake prior to admission:   [x] Good     [] Fair     [] Poor     [] Other:  Feeding Limitations:  [] Swallowing difficulty    [] Chewing difficulty    [] Other:  Current Meal Intake: Patient Vitals for the past 100 hrs:   % Diet Eaten   06/07/17 0937 40 %     BM: PTA   Skin Integrity: WDL  Edema: 2+ generalized, 2-3+ LEs  Pertinent Medications: Reviewed    Recent Labs      06/07/17   0551  06/06/17   1940   NA  137  135*   K  3.2*  3.2*   CL  100  99*   CO2  29  27   GLU  160*  228*   BUN  13  16   CREA  0.71  0.93   CA  8.4*  8.2*   MG   --   1.6   ALB  1.9*  1.7*   SGOT  43*  57*   ALT  30  31       Intake/Output Summary (Last 24 hours) at 06/07/17 1446  Last data filed at 06/07/17 1040   Gross per 24 hour   Intake             1050 ml   Output             1675 ml   Net             -625 ml       Anthropometrics:  Ht Readings from Last 1 Encounters:   05/18/17 5' 7\" (1.702 m)     Last 3 Recorded Weights in this Encounter    06/06/17 1911   Weight: 113.4 kg (250 lb)     Body mass index is 39.16 kg/(m^2).       Obese, Class II     Weight History: patient reports usual weight of 238 lb (weight gain due to fluid)     Weight Metrics 6/6/2017 5/25/2017 4/20/2017 4/6/2017 4/6/2017 3/30/2017 3/16/2017   Weight 250 lb 244 lb 6.4 oz - 233 lb 227 lb 1.6 oz - 240 lb   BMI 39.16 kg/m2 - 38.28 kg/m2 36.49 kg/m2 - 35.57 kg/m2 37.59 kg/m2        Admitting Diagnosis: Anemia  Hypervolemia  Anemia  Past Medical History:   Diagnosis Date    Acute parap[facility]ia ([facility] 4/20/2017    Benign hypertensive heart disease with systolic CHF, NYHA class 2 ([facility] 9/5/2012    Biventricular implantable cardioverter-defibrillator in situ 04/28/2005    Upgraded to BiV AICD; gen change 4/2008; pocket revision 10/2009; Abdominal - done on 8/22/2012 by Dr. Mirza Givens Cardiac cath 08/15/1996    Patent coronaries. Elev LVEDP. EF 50-55%.  Cardiac echocardiogram 06/23/2015    Ltd study. EF 45-50%. Mild, diffuse hypk. Severe apical hypk. No mass or thrombus was clearly identified, although imaging was suboptimal.      Cardiac nuclear imaging test 06/19/2015    Fixed distal apical, distal septal defect more likely due to RV pacing than prior infarct. No ischemia. EF 46%. RWMA c/w RV pacing. Nondiagnostic EKG on pharm stress test.      Cardiovascular lower extremity venous duplex 09/04/2012    Acute, non-occlusive DVT in CFV on right. No DVT on left. No superficial thrombosis bilaterally.  Cardiovascular upper extremity venous duplex 08/27/2012    DVT in axillary vein on left. Left subclavian was not visualized.     Chronic anemia 9/5/2012    Chronic systolic heart failure (HCC)     Decreased calculated glomerular filtration rate (GFR) 3/30/2017    Calculated GFR equivalent to that of CKD stage 3 = 30-59 ml/min    Diabetic neuropathy associated with type 2 diabetes mellitus ([facility] 6/28/2011    Difficult airway for intubation 08/22/2012    see anesthesia airway note    Dyslipidemia 6/28/2011    Gout     History of complete heart block 6/28/2011    History of Coumadin therapy     Anticoagulation for DVT of the LUE; Discontinued on 3/30/2017   Wilfrido Parker History of deep venous thrombosis 9/5/2012    Left upper extremity    History of pyelonephritis 3/30/2017    Left bundle branch block (LBBB) on electrocardiogram 6/28/2011    Nonischemic cardiomyopathy ([facility] 6/28/2011    Obesity (BMI 35.0-39.9 without comorbidity) ([facility] 3/13/2017    Obstructive sleep apnea on CPAP 2/7/2012    Psoas abscess, right ([facility] 4/20/2017    Psoas hematoma, right, secondary to anticoagulant therapy 3/30/2017    Type 2 diabetes mellitus with diabetic neuropathy ([facility] 6/28/2011       Education Needs:        [x] None identified  [] Identified - Not appropriate at this time  []  Identified and addressed - refer to education log  Learning Limitations:   [x] None identified  [] Identified    Cultural, Scientology & ethnic food preferences:  [x] None identified    [] Identified and addressed     ESTIMATED NUTRITION NEEDS:     Calories: 3608-0643 kcal (MSJx1.2-1.3) based on  [x] Usual/Actual  kg    [] IBW   CHO: 248-269 gm (50% kcal)   Protein:  gm (0.8-1 gm/kg) based on  [x] Actual BW      [] IBW   Fluid: 1 mL/kcal     MONITORING & EVALUATION:     Nutrition Goal(s):   1. Po intake of meals will meet >75% of patient estimated nutritional needs within the next 7 days.   Outcome:  [] Met/Ongoing    []  Not Met    [x] New/Initial Goal      Monitoring:   [x] Diet tolerance   [x] Meal intake   [] Supplement intake   [] GI symptoms/ability to tolerate po diet   [] Respiratory status   [x] Glycemic control, blood glucose level     Previous Recommendations (for follow-up assessments only):     []   Implemented       []   Not Implemented (RD to address)     [] No Recommendation Made     Discharge Planning: cardiac, diabetic diet   [x] Participated in care planning, discharge planning, & interdisciplinary rounds as appropriate      Nia Cotton, [address], [address] [place] [address]    Pager: [phone]

## 2017-06-07 NOTE — ED NOTES
Bedside and Verbal shift change report given to Evelia Ramos RN  (oncoming nurse) by Juan Daniel Humphries RN  (offgoing nurse). Report included the following information SBAR, Kardex, ED Summary, Procedure Summary, Intake/Output, MAR, Recent Results and Med Rec Status.

## 2017-06-07 NOTE — H&P
[address] [place] [address] PS    Name:  Joanna Rizzo  MR#:  [id number]  :  1950  Account #:  [de-identified]  Date of Adm:  2017      CHIEF COMPLAINT: Fever, hand tremors, leg pains. She was  concerned that she is having sepsis again and she was sent to the ER  for evaluation. Her hemoglobin is 8 grams, almost the same as  previously. The patient has a long history of ischemic cardiomyopathy,  has had a pacemaker put in place in  and has been on Coumadin  up until recently when she was hospitalized here at [facility]. The  patient reports a fall resulting in acute paraplegia. Apparently, she  developed right iliopsoas hemorrhage with extension to involved central spinal  canal or epidural hemorrhage. Subsequently, the psoas hematoma got  infected and had to be drained with a tube. Dr. Kamilla Hi, Infectious  Disease, assisted in managing this patient at the time in April when  she was very sick, was seen at that time by Dr. Mabel Gardner, neurosurgeon,  also Dr. Jayne López, Neurology. She also developed acute renal failure as  well as acute DVT. A filter was put in place. Her acute renal failure  is improved and was sent to the rehab for physical therapy. While at  the rehab, she is only able to sit down on a wheelchair with help. Most  recently, she started to feel feeling in both lower extremities, but  associated with pain. Prior to discharge, she could not feel her lower  extremities. PAST MEDICAL HISTORY: In brief, she has nonischemic  cardiomyopathy for many years, chronic anticoagulation up until 2017, when she had a fall and had a hematoma in her psoas  muscle, history of hyperlipidemia, pacemaker, defibrillator placement,  paraplegia since the fall, hysterectomy, cholecystectomy. ALLERGIES: MULTIPLE DRUG ALLERGIES LISTED ON THE  RECORD WHICH ARE MULTIPLE INCLUDIN. VANCOMYCIN. 2. CIPROFLOXACIN. 3. PENICILLIN. 4. LEVAQUIN. 5. ALSO ALLERGIC TO ULTRACET.   6. BACLOFEN. 7. VYTORIN. 8. PERCODAN. FAMILY HISTORY: Father had cancer. SOCIAL HISTORY: She is  and does not smoke or drink. REVIEW OF SYSTEMS  HEENT: No headache, no dizziness, no visual or hearing problem. CARDIOVASCULAR/RESPIRATORY: No chest pain. No shortness of  breath. GASTROINTESTINAL: She has discomfort in the left upper quadrant. GENITOURINARY: She is incontinent. MUSCULOSKELETAL: Paraplegic and starting to feel sensation in  the lower extremities, but associated with pain. PHYSICAL EXAMINATION  GENERAL: The patient is awake and alert, no acute distress. VITAL SIGNS: Blood pressure is 139/75, pulse 93, temperature 96. She was febrile last night at 100.9. HEAD: Normocephalic. No facial asymmetry. EYES: Ocular movements intact. NOSE: No septal deviation. MOUTH: Normal tongue, gums, buccal mucosa. NECK: Thyroid not enlarged. Trachea is midline. CHEST: Symmetrical, has a pacemaker, defibrillator. No breast  masses. HEART: Regular. LUNGS: Clear. ABDOMEN: Revealed some tenderness in the left upper quadrant,  otherwise soft, no guarding. EXTREMITIES: Revealed swelling of the right lower extremity, also the  left, but more on the right where she had a previous DVT. LABORATORY DATA: Review of laboratory data revealed the  following: Hemoglobin is 8.8, was 6.9 last night, 8.8 today following  transfusion. Urine showed moderate leukocyte esterase, 5-9 WBC. She  has a Krueger. Chemistry showed BUN 13, creatinine 0.721, potassium  3.2. BNP is 10,145. Chest x-ray showed a small pleural effusion,  improved from prior studies in May. IMPRESSION  1. Low-grade fever, suspect Krueger-related urinary tract infection. 2. Severe anemia. The patient reports a prior transfusion in the past  due to recurrent anemia. No history of bleeding. Last colonoscopy  or GI study was 10 years ago. I do not see stools for occult blood. IMPRESSION  1. Anemia.   2. Left upper quadrant pain, etiology unclear. 3. Ischemic cardiomyopathy with pacemaker defibrillator placement. 4. Paraplegia with neuropathic pain. 5. History of deep venous thrombosis right lower extremity. 6. Buttock crease tear POA  7. Left foot scabbed callous  Base 5th toe POA  8. Right foot callous base great toe, POA  9. Left foot callous bas great toe. POA    DISCUSSION: Resume home medications. Continue Rocephin as  ordered for urinary tract infection. Would ask consultant to see her  again. Will check records.     CODE STATUS: FULL MD Shena Herrera / Jody Cancer  D:  06/07/2017   12:15  T:  06/07/2017   13:27  Job #:  [id number]

## 2017-06-07 NOTE — ED NOTES
TRANSFER - OUT REPORT:    Verbal report given to Rosa Ruiz RN (name) on Gilberto Merritt  being transferred to [facility] room 212(unit) for routine progression of care       Report consisted of patients Situation, Background, Assessment and   Recommendations(SBAR). Information from the following report(s) SBAR, Kardex, ED Summary, Procedure Summary, Intake/Output, MAR, Recent Results, Med Rec Status and Cardiac Rhythm paced rhythm HR 92-94 was reviewed with the receiving nurse. Lines:   Peripheral IV 06/06/17 Right Wrist (Active)   Site Assessment Clean, dry, & intact 6/6/2017  9:34 PM   Phlebitis Assessment 0 6/6/2017  9:34 PM   Infiltration Assessment 0 6/6/2017  9:34 PM   Dressing Status Clean, dry, & intact 6/6/2017  9:34 PM   Dressing Type Transparent 6/6/2017  9:34 PM   Hub Color/Line Status Patent; Flushed 6/6/2017  9:34 PM       Peripheral IV 06/06/17 Left Wrist (Active)   Site Assessment Clean, dry, & intact 6/6/2017 10:40 PM   Phlebitis Assessment 0 6/6/2017 10:40 PM   Infiltration Assessment 0 6/6/2017 10:40 PM   Dressing Status Clean, dry, & intact 6/6/2017 10:40 PM   Dressing Type Tape;Transparent 6/6/2017 10:40 PM   Hub Color/Line Status Pink;Flushed;Patent; Infusing 6/6/2017 10:40 PM   Alcohol Cap Used No 6/6/2017 10:40 PM        Opportunity for questions and clarification was provided.       Patient transported with:   Monitor  Registered Nurse  Tech

## 2017-06-07 NOTE — ED NOTES
Bedside shift change report given to ORQUIDEA Webster RN (oncoming nurse) by Kathy Waller RN (offgoing nurse). Report included the following information SBAR, ED Summary, Intake/Output, MAR and Recent Results.

## 2017-06-07 NOTE — PROGRESS NOTES
Care Management Interventions  Mode of Transport at Discharge: BLS  Transition of Care Consult (CM Consult): SNF  Partner SNF: No  Reason Why Partner SNF Not Chosen: Positive previous encounter, Friend/family recommendation  Current Support Network: Lives with Spouse  Plan discussed with Pt/Family/Caregiver: Yes     Pt is a 77year old female admitted for anemia, Hypervolemia. Pt is alert and oriented in room with her daughter Ena Schwarz at her bedside. Pt states that she was transferred to this facility from [facility] where she was receiving rehab (short term). Pt's plan is to return to [facility] at discharge. Pt was being introduced to maneuvering a wheel chair at facility. Pt's daughter shares that pt has been experiencing pain in her legs. PT/OT orders will be needed for consideration to SNF and authorization from insurance.

## 2017-06-07 NOTE — PROGRESS NOTES
[facility] JET AEROSOL ASSESSMENT    Patient  Gilberto Merritt     77 y.o.   female     6/7/2017  11:25 AM    Breath Sounds Pre Procedure:  Breath Sounds Bilateral: Diminished                                            Breath Sounds Post Procedure: Breath Sounds Bilateral: Diminished                                               Breathing pattern: Pre procedure             Post procedure       Cough: Pre procedure                  Post procedure      Heart Rate: Pre procedure Pulse: 93           Post procedure Pulse: 91    Resp Rate: Pre procedure  Respirations: 19           Post procedure          Nebulizer Therapy: Current medications         Problem List:   Patient Active Problem List   Diagnosis Code    Nonischemic cardiomyopathy ([facility] I42.8    History of complete heart block Z86.79    Biventricular implantable cardioverter-defibrillator in situ Z95.810    Left bundle branch block (LBBB) on electrocardiogram I44.7    Type 2 diabetes mellitus with diabetic neuropathy ([facility]) E11.40    Dyslipidemia E78.5    Diabetic neuropathy associated with type 2 diabetes mellitus ([facility] E11.40    Obstructive sleep apnea on CPAP G47.33, Z99.89    AICD generator infection ([facility]) T82. 7XXA    Difficult airway for intubation T88. 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 1XXA    Impaired mobility and ADLs Z74.09    History of Coumadin therapy Z79.01    Gout M10.9    Acute paraplegia ([facility]) G82.20    Sepsis ([facility] A41.9    Psoas abscess, right ([address] K68.12    Krueger catheter in place on admission Z96.0    Urinary tract infection due to Enterococcus N39.0, B95.2    Group B streptococcal infection A49.1    Hypervolemia E87.70    Anemia D64.9       Patient alert and cooperative to use MDI: Yes    Home Respiratory Therapy Regimen/Frequency:  YES  Medication   Device  Frequency     SEVERITY INDEX:    ITEM 0 1 2 3 4 Score   Respiratory Pattern and or Rate Regular  10-19 Regular  20-24   24-30    30-34 Severe SOB or   Greater than 35 0   Breath Sounds Clear Occasional Wheeze Mild Wheezing Moderate Wheezing  wheezing/Absent breath sounds 0   Shortness of Breath None Dyspnea on Exertion Dyspnea at Rest Moderate Shortness of Breath at Rest Severe Shortness of Breath - Limited Speech 0       Total Score:  0    * Scoring Guidelines  0-4 pts:  PRN-BID   5-7 pts:  BID, TID, QID  8-9 pts:  TID, QID, Q6  10-12 pts:  Q4-Q6  * - Guidelines used with clinical judgement. PRN Treatments can be ordered to supplement scheduled treatments. Regardless of score, frequency should not be less than normal home regimen.     Recommended Order/Frequency:  Q4 PRN    Comments:          Respiratory Therapist: Harrison Hamilton RT

## 2017-06-07 NOTE — ED PROVIDER NOTES
HPI Comments:   8:19 PM Santiago Ramos is a 77 y.o. female with a h/o acute paraplegia, HTN, DVT, anemia, CHF, HLD, and DM, who presents to the ED for the evaluation of anemia. Pt also reports hand tremors, cyclic fever,  fatigue, chills, intermittent leg swelling, and increased L leg pain. Daughter states that her tremors seem to worsen as she becomes septic. Daughter states that the pt has been nonambulatory since falling and becoming septic about a month ago. Pt states that she was discharged from admission about 12 days ago and currently resides at [facility]. Pt is on 81mg ASA daily and is no longer on coumadin. No other complaints at this time. PCP: Lily Bowles,          The history is provided by the patient and a relative. Past Medical History:   Diagnosis Date    Acute paraplegia ([facility] [address] 4/20/2017    Benign hypertensive heart disease with systolic CHF, NYHA class 2 ([facility] [address] 9/5/2012    Biventricular implantable cardioverter-defibrillator in situ 04/28/2005    Upgraded to BiV AICD; gen change 4/2008; pocket revision 10/2009; Abdominal - done on 8/22/2012 by Dr. Vito Stevenson Cardiac cath 08/15/1996    Patent coronaries. Elev LVEDP. EF 50-55%.  Cardiac echocardiogram 06/23/2015    Ltd study. EF 45-50%. Mild, diffuse hypk. Severe apical hypk. No mass or thrombus was clearly identified, although imaging was suboptimal.      Cardiac nuclear imaging test 06/19/2015    Fixed distal apical, distal septal defect more likely due to RV pacing than prior infarct. No ischemia. EF 46%. RWMA c/w RV pacing. Nondiagnostic EKG on pharm stress test.      Cardiovascular lower extremity venous duplex 09/04/2012    Acute, non-occlusive DVT in CFV on right. No DVT on left. No superficial thrombosis bilaterally.  Cardiovascular upper extremity venous duplex 08/27/2012    DVT in axillary vein on left. Left subclavian was not visualized.     Chronic anemia 9/5/2012    Chronic systolic heart failure (HCC)     Decreased calculated glomerular filtration rate (GFR) 3/30/2017    Calculated GFR equivalent to that of CKD stage 3 = 30-59 ml/min    Diabetic neuropathy associated with type 2 diabetes mellitus ([address] 6/28/2011    Difficult airway for intubation 08/22/2012    see anesthesia airway note    Dyslipidemia 6/28/2011    Gout     History of complete heart block 6/28/2011    History of Coumadin therapy     Anticoagulation for DVT of the LUE; Discontinued on 3/30/2017    History of deep venous thrombosis 9/5/2012    Left upper extremity    History of pyelonephritis 3/30/2017    Left bundle branch block (LBBB) on electrocardiogram 6/28/2011    Nonischemic cardiomyopathy ([address] 6/28/2011    Obesity (BMI 35.0-39.9 without comorbidity) ([address] 3/13/2017    Obstructive sleep apnea on CPAP 2/7/2012    Psoas abscess, right ([address] 4/20/2017    Psoas hematoma, right, secondary to anticoagulant therapy 3/30/2017    Type 2 diabetes mellitus with diabetic neuropathy ([address] 6/28/2011       Past Surgical History:   Procedure Laterality Date    HX CARPAL TUNNEL RELEASE  4/07    right     HX CHOLECYSTECTOMY  1994    HX HYSTERECTOMY  1973    HX OTHER SURGICAL  6/11/2012    AICD revision    HX PACEMAKER  4/28/2005    [employer] AICD         Family History:   Problem Relation Age of Onset    Cancer Father      Leukemia       Social History     Social History    Marital status:      Spouse name: N/A    Number of children: N/A    Years of education: N/A     Occupational History    Not on file. Social History Main Topics    Smoking status: Never Smoker    Smokeless tobacco: Never Used    Alcohol use No    Drug use: No    Sexual activity: Yes     Partners: Male     Other Topics Concern    Not on file     Social History Narrative         ALLERGIES: Vancomycin; Ampicillin; Bactrim [sulfamethoxazole-trimethoprim]; Blueberry; Ciprofloxacin; Codeine; Crestor [rosuvastatin];  Darvocet a500 [propoxyphene n-acetaminophen]; Demerol [meperidine]; Levaquin [levofloxacin]; Lipitor [atorvastatin]; Magnesium oxide; Minocin [minocycline]; Pcn [penicillins]; Pravachol [pravastatin]; Sulfa (sulfonamide antibiotics); Ultracet [tramadol-acetaminophen]; Vicodin [hydrocodone-acetaminophen]; Vytorin 10-10 [ezetimibe-simvastatin]; and Percodan [oxycodone hcl-oxycodone-asa]    Review of Systems   Constitutional: Positive for chills, fatigue and fever. Negative for unexpected weight change. HENT: Negative for congestion and rhinorrhea. Respiratory: Negative for chest tightness and shortness of breath. Cardiovascular: Positive for leg swelling. Negative for chest pain and palpitations. Gastrointestinal: Negative for abdominal pain, nausea and vomiting. Genitourinary: Negative for dysuria. Musculoskeletal: Positive for myalgias. Negative for back pain. Skin: Negative for rash. Neurological: Positive for tremors. Negative for dizziness and weakness. Psychiatric/Behavioral: The patient is not nervous/anxious. Vitals:    06/06/17 1900 06/06/17 1901 06/06/17 1911   BP: 128/57     Pulse: 97 99    Resp: 24 20    Temp:   99.6 °F (37.6 °C)   SpO2:  99%    Weight:   113.4 kg (250 lb)        99% on RA, indicating adequate oxygenation. Physical Exam   Constitutional: She is oriented to person, place, and time. She appears well-developed and well-nourished. No distress. Coarse tremor noted    HENT:   Head: Normocephalic and atraumatic. Right Ear: External ear normal.   Left Ear: External ear normal.   Nose: Nose normal.   Mouth/Throat: Oropharynx is clear and moist.   Eyes: Conjunctivae and EOM are normal. Pupils are equal, round, and reactive to light. No scleral icterus. Neck: Normal range of motion. Neck supple. No JVD present. No tracheal deviation present. No thyromegaly present. Cardiovascular: Normal rate, regular rhythm, normal heart sounds and intact distal pulses.   Exam reveals no gallop and no friction rub. No murmur heard. Pulmonary/Chest: Effort normal and breath sounds normal. She exhibits no tenderness. Abdominal: Soft. Bowel sounds are normal. She exhibits no distension. There is tenderness. There is no rebound and no guarding. Diffuse poorly localized pain    Genitourinary:   Genitourinary Comments: Green, trace heme positive    Musculoskeletal: She exhibits no edema or tenderness. Pitting edema B LE, no strength B LE    Lymphadenopathy:     She has no cervical adenopathy. Neurological: She is alert and oriented to person, place, and time. No cranial nerve deficit. Coordination normal.   Coarse tremor in B UE, no arm drift, B LE paraplegia, R arm PICC noted    Skin: Skin is warm and dry. Psychiatric: She has a normal mood and affect. Her behavior is normal. Judgment and thought content normal.   Supportive and insightful family at the bedside    Nursing note and vitals reviewed. MDM  Number of Diagnoses or Management Options  Diagnosis management comments: Pt is a 71yo female with a complicated course after a fall with infected R psoas hematoma with abscess, followed by paraplegia thought to be related to a spinal cord hematoma/infarct, pain at abdominal pacemaker, heart block with pacer in abdominal wall, hx of DVT with apparent IVC filter per family off AC, chronic anemia, NICM with a dropping Hgb and tremor. Pt has been having dark stools with progressive weakness. They are concerned she is getting septic. Unsure why her Hgb is dropping and is on her rocephin in her PICC. Pt is on fluconazole as well and her drain has been removed. Will follow Hgb, lactate, hemmocult and reevaluate.  Hulen Carrel, DO 8:52 PM      ED Course       Procedures    Medications ordered:   Medications   0.9% sodium chloride infusion 250 mL (not administered)         Lab findings:  Recent Results (from the past 12 hour(s))   CBC WITH AUTOMATED DIFF    Collection Time: 06/06/17 7:40 PM   Result Value Ref Range    WBC 7.2 4.6 - 13.2 K/uL    RBC 2.64 (L) 4.20 - 5.30 M/uL    HGB 6.9 (L) 12.0 - 16.0 g/dL    HCT 20.8 (L) 35.0 - 45.0 %    MCV 78.8 74.0 - 97.0 FL    MCH 26.1 24.0 - 34.0 PG    MCHC 33.2 31.0 - 37.0 g/dL    RDW 17.3 (H) 11.6 - 14.5 %    PLATELET 816 342 - 791 K/uL    MPV 8.6 (L) 9.2 - 11.8 FL    NEUTROPHILS 68 40 - 73 %    LYMPHOCYTES 22 21 - 52 %    MONOCYTES 9 3 - 10 %    EOSINOPHILS 1 0 - 5 %    BASOPHILS 0 0 - 2 %    ABS. NEUTROPHILS 4.9 1.8 - 8.0 K/UL    ABS. LYMPHOCYTES 1.6 0.9 - 3.6 K/UL    ABS. MONOCYTES 0.6 0.05 - 1.2 K/UL    ABS. EOSINOPHILS 0.1 0.0 - 0.4 K/UL    ABS. BASOPHILS 0.0 0.0 - 0.1 K/UL    DF AUTOMATED     METABOLIC PANEL, COMPREHENSIVE    Collection Time: 06/06/17  7:40 PM   Result Value Ref Range    Sodium 135 (L) 136 - 145 mmol/L    Potassium 3.2 (L) 3.5 - 5.5 mmol/L    Chloride 99 (L) 100 - 108 mmol/L    CO2 27 21 - 32 mmol/L    Anion gap 9 3.0 - 18 mmol/L    Glucose 228 (H) 74 - 99 mg/dL    BUN 16 7.0 - 18 MG/DL    Creatinine 0.93 0.6 - 1.3 MG/DL    BUN/Creatinine ratio 17 12 - 20      GFR est AA >60 >60 ml/min/1.73m2    GFR est non-AA >60 >60 ml/min/1.73m2    Calcium 8.2 (L) 8.5 - 10.1 MG/DL    Bilirubin, total 0.3 0.2 - 1.0 MG/DL    ALT (SGPT) 31 13 - 56 U/L    AST (SGOT) 57 (H) 15 - 37 U/L    Alk.  phosphatase 206 (H) 45 - 117 U/L    Protein, total 6.3 (L) 6.4 - 8.2 g/dL    Albumin 1.7 (L) 3.4 - 5.0 g/dL    Globulin 4.6 (H) 2.0 - 4.0 g/dL    A-G Ratio 0.4 (L) 0.8 - 1.7     TYPE & SCREEN    Collection Time: 06/06/17  7:40 PM   Result Value Ref Range    Crossmatch Expiration 06/09/2017     ABO/Rh(D) O POSITIVE     Antibody screen NEG     Unit number [id number]     Blood component type RC LR AS1     Unit division 00     Status of unit ALLOCATED     Crossmatch result Compatible     Unit number [id number]     Blood component type RC LR AS3,1     Unit division 00     Status of unit ALLOCATED     Crossmatch result Compatible    PROTHROMBIN TIME + INR Collection Time: 06/06/17  7:40 PM   Result Value Ref Range    Prothrombin time 15.1 11.5 - 15.2 sec    INR 1.2 0.8 - 1.2     PTT    Collection Time: 06/06/17  7:40 PM   Result Value Ref Range    aPTT 42.6 (H) 23.0 - 36.4 SEC   URINALYSIS W/ RFLX MICROSCOPIC    Collection Time: 06/06/17  8:55 PM   Result Value Ref Range    Color YELLOW      Appearance CLOUDY      Specific gravity 1.017 1.005 - 1.030      pH (UA) 6.0 5.0 - 8.0      Protein 30 (A) NEG mg/dL    Glucose NEGATIVE  NEG mg/dL    Ketone NEGATIVE  NEG mg/dL    Bilirubin NEGATIVE  NEG      Blood TRACE (A) NEG      Urobilinogen 1.0 0.2 - 1.0 EU/dL    Nitrites NEGATIVE  NEG      Leukocyte Esterase MODERATE (A) NEG     POC LACTIC ACID    Collection Time: 06/06/17  9:00 PM   Result Value Ref Range    Lactic Acid (POC) 1.5 0.4 - 2.0 mmol/L        EKG interpretation per Tommy Diggs MD :  Paced    X-Ray, CT or other radiology findings or impressions:  No results found. Progress notes, Consult notes or additional Procedure notes:   Pt is trace heme positive and no evidence of sepsis as her lactate, white count are reassuring. Unsure if she has GI bleeding vs chronic disease vs fluid collection. Discussed the case with Dr. Gemma Feliciano and will admit for transfusion and further care. Pt family is aware of the plan. Dione Lima DO 9:17 PM      Reevaluation of patient:   I have reevaluated patient. Patient is feeling unchanged. Diagnosis: Symptomatic anemia, heme positive stools     Follow-up Information     None           Patient's Medications   Start Taking    No medications on file   Continue Taking    ACETAMINOPHEN (TYLENOL) 325 MG TABLET    Take 2 Tabs by mouth every four (4) hours as needed (for fever or pain level less than 5/10). Indications: Fever, Pain    ALBUTEROL-IPRATROPIUM (DUO-NEB) 2.5 MG-0.5 MG/3 ML NEBU    3 mL by Nebulization route four (4) times daily. ALLOPURINOL (ZYLOPRIM) 100 MG TABLET    Take 0.5 Tabs by mouth daily. Indications: HYPERURICEMIA    ALUMINUM-MAGNESIUM HYDROXIDE (MAALOX) 200-200 MG/5 ML SUSPENSION    Take 15 mL by mouth four (4) times daily as needed for Indigestion. CEFTRIAXONE 2 GRAM 2 G IVPB    2 g by IntraVENous route every twenty-four (24) hours for 20 days. CHOLECALCIFEROL (VITAMIN D3) 1,000 UNIT TABLET    Take 2 Tabs by mouth daily. Indications: PREVENTION OF VITAMIN D DEFICIENCY    FAMOTIDINE (PEPCID) 20 MG TABLET    Take 1 Tab by mouth nightly. FERROUS SULFATE 325 MG (65 MG IRON) TABLET    Take 1 Tab by mouth two (2) times daily (with meals) for 14 days. FOLIC ACID (FOLVITE) 1 MG TABLET    Take 1 Tab by mouth daily. INSULIN ASPART (NOVOLOG) 100 UNIT/ML INJECTION    INITIATE INSULIN CORRECTIVE PROTOCOL (HR): Normal Insulin Sensitivity  For Blood Sugar (mg/dL) of:    Less than 150 =   0 units          150 -199 =   2 units 200 -249 =   4 units 250 -299 =   6 units 300 -349 =   8 units 350 and above =   10 units If 2 glucose readings are above 200 mg/dL    INSULIN GLARGINE (LANTUS) 100 UNIT/ML INJECTION    5 Units by SubCUTAneous route nightly. Indications: type 2 diabetes mellitus    LACTOBACILLUS ACIDOPH & BULGAR (FLORANEX) 1 MILLION CELL TAB TABLET    Take 1 Tab by mouth two (2) times a day for 21 days. MIDODRINE (PROAMITINE) 2.5 MG TABLET    5 mg [2 tablets] po three time a day for 5 days then 2.5 mg po three time a day x 5 days then stop    ONDANSETRON (ZOFRAN ODT) 4 MG DISINTEGRATING TABLET    Take 4 mg by mouth every eight (8) hours as needed for Nausea. OXYCODONE-ACETAMINOPHEN (PERCOCET 7.5) 7.5-325 MG PER TABLET    Take 1 Tab by mouth every six (6) hours as needed (for pain level greater than 5/10). Max Daily Amount: 4 Tabs. Indications: Pain    POLYETHYLENE GLYCOL (MIRALAX) 17 GRAM PACKET    Take 1 Packet by mouth two (2) times a day. PRAVASTATIN (PRAVACHOL) 40 MG TABLET    Take 40 mg by mouth nightly.  Indications: DYSLIPIDEMIA    SENNA-DOCUSATE (PERICOLACE) 8.6-50 MG PER TABLET    Take 2 Tabs by mouth daily (after dinner). Indications: Constipation   These Medications have changed    No medications on file   Stop Taking    No medications on file         SCRIBE ATTESTATION STATEMENT  Documented by: Errol Gruber. Johnny Bhatti for, and in the presence of, Gali Alfaro MD 8:18 PM   Signed by Rocío Holly, 6/6/2017 8:18 PM     PROVIDER ATTESTATION STATEMENT  I personally performed the services described in the documentation, reviewed the documentation, as recorded by the scribe in my presence, and it accurately and completely records my words and actions.   Gali Alfaro MD

## 2017-06-08 ENCOUNTER — APPOINTMENT (OUTPATIENT)
Dept: CT IMAGING | Age: 67
DRG: 372 | End: 2017-06-08
Attending: INTERNAL MEDICINE
Payer: COMMERCIAL

## 2017-06-08 LAB
ABO + RH BLD: NORMAL
ATRIAL RATE: 91 BPM
BASOPHILS # BLD AUTO: 0 K/UL (ref 0–0.1)
BASOPHILS # BLD: 1 % (ref 0–2)
BLD PROD TYP BPU: NORMAL
BLD PROD TYP BPU: NORMAL
BLOOD GROUP ANTIBODIES SERPL: NORMAL
BPU ID: NORMAL
BPU ID: NORMAL
CALCULATED P AXIS, ECG09: 93 DEGREES
CALCULATED R AXIS, ECG10: 95 DEGREES
CALCULATED T AXIS, ECG11: 79 DEGREES
CROSSMATCH RESULT,%XM: NORMAL
CROSSMATCH RESULT,%XM: NORMAL
DIAGNOSIS, 93000: NORMAL
DIFFERENTIAL METHOD BLD: ABNORMAL
EOSINOPHIL # BLD: 0.1 K/UL (ref 0–0.4)
EOSINOPHIL NFR BLD: 2 % (ref 0–5)
ERYTHROCYTE [DISTWIDTH] IN BLOOD BY AUTOMATED COUNT: 16.5 % (ref 11.6–14.5)
GLUCOSE BLD STRIP.AUTO-MCNC: 122 MG/DL (ref 70–110)
GLUCOSE BLD STRIP.AUTO-MCNC: 133 MG/DL (ref 70–110)
GLUCOSE BLD STRIP.AUTO-MCNC: 155 MG/DL (ref 70–110)
GLUCOSE BLD STRIP.AUTO-MCNC: 170 MG/DL (ref 70–110)
HCT VFR BLD AUTO: 27.4 % (ref 35–45)
HGB BLD-MCNC: 9.3 G/DL (ref 12–16)
LYMPHOCYTES # BLD AUTO: 27 % (ref 21–52)
LYMPHOCYTES # BLD: 1.9 K/UL (ref 0.9–3.6)
MCH RBC QN AUTO: 27.3 PG (ref 24–34)
MCHC RBC AUTO-ENTMCNC: 33.9 G/DL (ref 31–37)
MCV RBC AUTO: 80.4 FL (ref 74–97)
MONOCYTES # BLD: 0.6 K/UL (ref 0.05–1.2)
MONOCYTES NFR BLD AUTO: 9 % (ref 3–10)
NEUTS SEG # BLD: 4.4 K/UL (ref 1.8–8)
NEUTS SEG NFR BLD AUTO: 61 % (ref 40–73)
P-R INTERVAL, ECG05: 134 MS
PLATELET # BLD AUTO: 284 K/UL (ref 135–420)
PMV BLD AUTO: 8.5 FL (ref 9.2–11.8)
Q-T INTERVAL, ECG07: 454 MS
QRS DURATION, ECG06: 188 MS
QTC CALCULATION (BEZET), ECG08: 558 MS
RBC # BLD AUTO: 3.41 M/UL (ref 4.2–5.3)
SPECIMEN EXP DATE BLD: NORMAL
STATUS OF UNIT,%ST: NORMAL
STATUS OF UNIT,%ST: NORMAL
UNIT DIVISION, %UDIV: 0
UNIT DIVISION, %UDIV: 0
VENTRICULAR RATE, ECG03: 91 BPM
WBC # BLD AUTO: 7.1 K/UL (ref 4.6–13.2)

## 2017-06-08 PROCEDURE — 85025 COMPLETE CBC W/AUTO DIFF WBC: CPT | Performed by: FAMILY MEDICINE

## 2017-06-08 PROCEDURE — 74011636637 HC RX REV CODE- 636/637: Performed by: FAMILY MEDICINE

## 2017-06-08 PROCEDURE — 74177 CT ABD & PELVIS W/CONTRAST: CPT

## 2017-06-08 PROCEDURE — 74011636320 HC RX REV CODE- 636/320: Performed by: FAMILY MEDICINE

## 2017-06-08 PROCEDURE — 77030011256 HC DRSG MEPILEX <16IN NO BORD MOLN -A

## 2017-06-08 PROCEDURE — 77030020186 HC BOOT HL PROTCT SAGE -B

## 2017-06-08 PROCEDURE — 74011250637 HC RX REV CODE- 250/637: Performed by: INTERNAL MEDICINE

## 2017-06-08 PROCEDURE — 74011250636 HC RX REV CODE- 250/636: Performed by: FAMILY MEDICINE

## 2017-06-08 PROCEDURE — 74011250637 HC RX REV CODE- 250/637: Performed by: FAMILY MEDICINE

## 2017-06-08 PROCEDURE — 65660000000 HC RM CCU STEPDOWN

## 2017-06-08 PROCEDURE — 82962 GLUCOSE BLOOD TEST: CPT

## 2017-06-08 PROCEDURE — 77030005538 HC CATH URETH FOL44 BARD -B

## 2017-06-08 PROCEDURE — 74011000258 HC RX REV CODE- 258: Performed by: FAMILY MEDICINE

## 2017-06-08 PROCEDURE — 36415 COLL VENOUS BLD VENIPUNCTURE: CPT | Performed by: FAMILY MEDICINE

## 2017-06-08 PROCEDURE — 77030033263 HC DRSG MEPILEX 16-48IN BORD MOLN -B

## 2017-06-08 RX ORDER — POTASSIUM CHLORIDE 20 MEQ/1
20 TABLET, EXTENDED RELEASE ORAL 2 TIMES DAILY
Status: DISCONTINUED | OUTPATIENT
Start: 2017-06-08 | End: 2017-06-22 | Stop reason: HOSPADM

## 2017-06-08 RX ORDER — GABAPENTIN 300 MG/1
600 CAPSULE ORAL 2 TIMES DAILY
Status: DISCONTINUED | OUTPATIENT
Start: 2017-06-08 | End: 2017-06-14

## 2017-06-08 RX ADMIN — FAMOTIDINE 20 MG: 20 TABLET ORAL at 21:25

## 2017-06-08 RX ADMIN — LACTOBACILLUS TAB 1 TABLET: TAB at 19:05

## 2017-06-08 RX ADMIN — ACETAMINOPHEN 650 MG: 325 TABLET ORAL at 21:29

## 2017-06-08 RX ADMIN — ACETAMINOPHEN 650 MG: 325 TABLET ORAL at 10:37

## 2017-06-08 RX ADMIN — CEFTRIAXONE 2 G: 2 INJECTION, POWDER, FOR SOLUTION INTRAMUSCULAR; INTRAVENOUS at 00:19

## 2017-06-08 RX ADMIN — DIPHENHYDRAMINE HYDROCHLORIDE 25 MG: 25 CAPSULE ORAL at 10:38

## 2017-06-08 RX ADMIN — FERROUS SULFATE TAB 325 MG (65 MG ELEMENTAL FE) 325 MG: 325 (65 FE) TAB at 16:50

## 2017-06-08 RX ADMIN — GABAPENTIN 600 MG: 300 CAPSULE ORAL at 11:43

## 2017-06-08 RX ADMIN — Medication 10 ML: at 07:38

## 2017-06-08 RX ADMIN — OXYCODONE HYDROCHLORIDE AND ACETAMINOPHEN 1 TABLET: 7.5; 325 TABLET ORAL at 15:47

## 2017-06-08 RX ADMIN — POTASSIUM CHLORIDE 10 MEQ: 10 INJECTION, SOLUTION INTRAVENOUS at 01:51

## 2017-06-08 RX ADMIN — INSULIN GLARGINE 5 UNITS: 100 INJECTION, SOLUTION SUBCUTANEOUS at 22:04

## 2017-06-08 RX ADMIN — LACTOBACILLUS TAB 1 TABLET: TAB at 11:31

## 2017-06-08 RX ADMIN — FOLIC ACID 1 MG: 1 TABLET ORAL at 11:32

## 2017-06-08 RX ADMIN — FUROSEMIDE 40 MG: 40 TABLET ORAL at 11:32

## 2017-06-08 RX ADMIN — FERROUS SULFATE TAB 325 MG (65 MG ELEMENTAL FE) 325 MG: 325 (65 FE) TAB at 11:30

## 2017-06-08 RX ADMIN — MIDODRINE HYDROCHLORIDE 2.5 MG: 2.5 TABLET ORAL at 16:50

## 2017-06-08 RX ADMIN — ALLOPURINOL 50 MG: 100 TABLET ORAL at 11:32

## 2017-06-08 RX ADMIN — MIDODRINE HYDROCHLORIDE 2.5 MG: 2.5 TABLET ORAL at 11:31

## 2017-06-08 RX ADMIN — ACETAMINOPHEN 650 MG: 325 TABLET ORAL at 00:19

## 2017-06-08 RX ADMIN — IOPAMIDOL 100 ML: 612 INJECTION, SOLUTION INTRAVENOUS at 11:11

## 2017-06-08 RX ADMIN — GABAPENTIN 600 MG: 300 CAPSULE ORAL at 19:04

## 2017-06-08 RX ADMIN — POTASSIUM CHLORIDE 10 MEQ: 10 INJECTION, SOLUTION INTRAVENOUS at 00:17

## 2017-06-08 RX ADMIN — Medication 400 MG: at 11:32

## 2017-06-08 RX ADMIN — Medication 400 MG: at 19:05

## 2017-06-08 RX ADMIN — POTASSIUM CHLORIDE 20 MEQ: 20 TABLET, EXTENDED RELEASE ORAL at 19:05

## 2017-06-08 RX ADMIN — INSULIN LISPRO 2 UNITS: 100 INJECTION, SOLUTION INTRAVENOUS; SUBCUTANEOUS at 22:04

## 2017-06-08 RX ADMIN — VITAMIN D, TAB 1000IU (100/BT) 2000 UNITS: 25 TAB at 11:31

## 2017-06-08 RX ADMIN — Medication 10 ML: at 14:00

## 2017-06-08 RX ADMIN — DIPHENHYDRAMINE HYDROCHLORIDE 25 MG: 25 CAPSULE ORAL at 16:50

## 2017-06-08 RX ADMIN — INSULIN LISPRO 2 UNITS: 100 INJECTION, SOLUTION INTRAVENOUS; SUBCUTANEOUS at 16:56

## 2017-06-08 NOTE — CONSULTS
[facility] 1947, [facility]   www. [email or web address]      Impression:   1. Anemia. Chronic, may be due to post-surgical loss/hematoma. No overt GI losses described. Plan:     1. Will need to recover from her current sitiuation and have upodated cardiac eval before anything invasive from our standpoint. She will need eventual colonoscopy as OP (followed by Dr. Jesús Briscoe.) Transfuse prior to DC. Will arrange OP visit and be avail if needed during her stay, please call w any questions. Chief Complaint: abd pain      HPI:  Harish Hill is a 77 y.o. female who is being seen on consult for anemia. Had a post op hematoma related to recent fall. Acte paraplegia as well. Pain in LUQ around defibrillator. No bowel issues. No n/v, no bleeding. Last colo neg 10 years ago. Has some underlying cardiac issues, as well. PMH:   Past Medical History:   Diagnosis Date    Acute paraplegia ([facility] 4/20/2017    Benign hypertensive heart disease with systolic CHF, NYHA class 2 ([facility] [address] 9/5/2012    Biventricular implantable cardioverter-defibrillator in situ 04/28/2005    Upgraded to BiV AICD; gen change 4/2008; pocket revision 10/2009; Abdominal - done on 8/22/2012 by Dr. Claire Chin Cardiac cath 08/15/1996    Patent coronaries. Elev LVEDP. EF 50-55%.  Cardiac echocardiogram 06/23/2015    Ltd study. EF 45-50%. Mild, diffuse hypk. Severe apical hypk. No mass or thrombus was clearly identified, although imaging was suboptimal.      Cardiac nuclear imaging test 06/19/2015    Fixed distal apical, distal septal defect more likely due to RV pacing than prior infarct. No ischemia. EF 46%. RWMA c/w RV pacing. Nondiagnostic EKG on pharm stress test.      Cardiovascular lower extremity venous duplex 09/04/2012    Acute, non-occlusive DVT in CFV on right. No DVT on left. No superficial thrombosis bilaterally.     Cardiovascular upper extremity venous duplex 08/27/2012    DVT in axillary vein on left. Left subclavian was not visualized.  Chronic anemia 9/5/2012    Chronic systolic heart failure (HCC)     Decreased calculated glomerular filtration rate (GFR) 3/30/2017    Calculated GFR equivalent to that of CKD stage 3 = 30-59 ml/min    Diabetic neuropathy associated with type 2 diabetes mellitus ([address] 6/28/2011    Difficult airway for intubation 08/22/2012    see anesthesia airway note    Dyslipidemia 6/28/2011    Gout     History of complete heart block 6/28/2011    History of Coumadin therapy     Anticoagulation for DVT of the LUE; Discontinued on 3/30/2017    History of deep venous thrombosis 9/5/2012    Left upper extremity    History of pyelonephritis 3/30/2017    Left bundle branch block (LBBB) on electrocardiogram 6/28/2011    Nonischemic cardiomyopathy ([address] 6/28/2011    Obesity (BMI 35.0-39.9 without comorbidity) ([address] 3/13/2017    Obstructive sleep apnea on CPAP 2/7/2012    Psoas abscess, right ([address] 4/20/2017    Psoas hematoma, right, secondary to anticoagulant therapy 3/30/2017    Type 2 diabetes mellitus with diabetic neuropathy ([address] 6/28/2011       PSH:   Past Surgical History:   Procedure Laterality Date    HX CARPAL TUNNEL RELEASE  4/07    right     HX CHOLECYSTECTOMY  1994    HX HYSTERECTOMY  1973    HX OTHER SURGICAL  6/11/2012    AICD revision    HX PACEMAKER  4/28/2005    [employer] AICD       Social HX:   Social History     Social History    Marital status:      Spouse name: N/A    Number of children: N/A    Years of education: N/A     Occupational History    Not on file.      Social History Main Topics    Smoking status: Never Smoker    Smokeless tobacco: Never Used    Alcohol use No    Drug use: No    Sexual activity: Yes     Partners: Male     Other Topics Concern    Not on file     Social History Narrative       FHX:   Family History   Problem Relation Age of Onset    Cancer Father      Leukemia       Allergy:   Allergies   Allergen Reactions    Vancomycin Itching    Ampicillin Itching    Bactrim [Sulfamethoxazole-Trimethoprim] Unknown (comments)    Blueberry Swelling     Causes throat swelling    Ciprofloxacin Itching    Codeine Other (comments)     Jumpy feeling    Crestor [Rosuvastatin] Itching    Darvocet A500 [Propoxyphene N-Acetaminophen] Itching    Demerol [Meperidine] Itching    Levaquin [Levofloxacin] Itching    Lipitor [Atorvastatin] Myalgia    Magnesium Oxide Itching     nausea    Minocin [Minocycline] Unknown (comments)    Pcn [Penicillins] Itching    Pravachol [Pravastatin] Swelling     Swelling in mouth     Shellfish Derived Unknown (comments)    Sulfa (Sulfonamide Antibiotics) Itching    Ultracet [Tramadol-Acetaminophen] Itching    Vicodin [Hydrocodone-Acetaminophen] Unknown (comments)    Vytorin 10-10 [Ezetimibe-Simvastatin] Myalgia    Percodan [Oxycodone Hcl-Oxycodone-Asa] Itching       Home Medications:     Prescriptions Prior to Admission   Medication Sig    oxyCODONE-acetaminophen (PERCOCET 7.5) 7.5-325 mg per tablet Take 1 Tab by mouth every six (6) hours as needed (for pain level greater than 5/10). Max Daily Amount: 4 Tabs. Indications: Pain    albuterol-ipratropium (DUO-NEB) 2.5 mg-0.5 mg/3 ml nebu 3 mL by Nebulization route four (4) times daily.  allopurinol (ZYLOPRIM) 100 mg tablet Take 0.5 Tabs by mouth daily. Indications: HYPERURICEMIA    cefTRIAXone 2 gram 2 g IVPB 2 g by IntraVENous route every twenty-four (24) hours for 20 days.  midodrine (PROAMITINE) 2.5 mg tablet 5 mg [2 tablets] po three time a day for 5 days then 2.5 mg po three time a day x 5 days then stop    Lactobacillus Acidoph & Bulgar (FLORANEX) 1 million cell tab tablet Take 1 Tab by mouth two (2) times a day for 21 days.  aluminum-magnesium hydroxide (MAALOX) 200-200 mg/5 mL suspension Take 15 mL by mouth four (4) times daily as needed for Indigestion.  folic acid (FOLVITE) 1 mg tablet Take 1 Tab by mouth daily.  insulin glargine (LANTUS) 100 unit/mL injection 5 Units by SubCUTAneous route nightly. Indications: type 2 diabetes mellitus    ferrous sulfate 325 mg (65 mg iron) tablet Take 1 Tab by mouth two (2) times daily (with meals) for 14 days.  famotidine (PEPCID) 20 mg tablet Take 1 Tab by mouth nightly.  polyethylene glycol (MIRALAX) 17 gram packet Take 1 Packet by mouth two (2) times a day.  cholecalciferol (VITAMIN D3) 1,000 unit tablet Take 2 Tabs by mouth daily. Indications: PREVENTION OF VITAMIN D DEFICIENCY    ondansetron (ZOFRAN ODT) 4 mg disintegrating tablet Take 4 mg by mouth every eight (8) hours as needed for Nausea.  acetaminophen (TYLENOL) 325 mg tablet Take 2 Tabs by mouth every four (4) hours as needed (for fever or pain level less than 5/10). Indications: Fever, Pain    senna-docusate (PERICOLACE) 8.6-50 mg per tablet Take 2 Tabs by mouth daily (after dinner). Indications: Constipation    pravastatin (PRAVACHOL) 40 mg tablet Take 40 mg by mouth nightly. Indications: DYSLIPIDEMIA    insulin aspart (NOVOLOG) 100 unit/mL injection INITIATE INSULIN CORRECTIVE PROTOCOL (HR): Normal Insulin Sensitivity  For Blood Sugar (mg/dL) of:    Less than 150 =   0 units          150 -199 =   2 units 200 -249 =   4 units 250 -299 =   6 units 300 -349 =   8 units 350 and above =   10 units If 2 glucose readings are above 200 mg/dL       Review of Systems:     Constitutional: No fevers, chills, weight loss, fatigue. Skin: No rashes, pruritis, jaundice, ulcerations, erythema. HENT: No headaches, nosebleeds, sinus pressure, rhinorrhea, sore throat. Eyes: No visual changes, blurred vision, eye pain, photophobia, jaundice. Cardiovascular: No chest pain, heart palpitations. Respiratory: No cough, SOB, wheezing, chest discomfort, orthopnea. Gastrointestinal: abd pain   Genitourinary: No dysuria, bleeding, discharge, pyuria. Musculoskeletal: No weakness, arthralgias, wasting. Endo: No sweats. Heme: No bruising, easy bleeding. Allergies: As noted. Neurological: Cranial nerves intact. Alert and oriented. Gait not assessed. Psychiatric:  No anxiety, depression, hallucinations. Visit Vitals    /74 (BP 1 Location: Left arm, BP Patient Position: At rest)    Pulse 87    Temp 98.2 °F (36.8 °C)    Resp 18    Wt 100.2 kg (220 lb 14.4 oz)    SpO2 96%    Breastfeeding No    BMI 34.6 kg/m2       Physical Assessment:     constitutional: appearance: well developed, normal habitus, no deformities, in no acute distress. skin: inspection: no rashes, ulcers, icterus or other lesions; no clubbing or telangiectasias. palpation: no induration or subcutaneos nodules. eyes: inspection: normal conjunctivae and lids; no jaundice pupils: symmetrical, normoreactive to light, normal accommodation and size. ENMT: mouth: normal oral mucosa,lips and gums; good dentition. respiratory: effort: normal chest excursion; no intercostal retraction or accessory muscle use. cardiovascular: abdominal aorta: normal size and position; no bruits. palpation: PMI of normal size and position; normal rhythm; no thrill or murmurs. abdominal: abdomen: normal consistency; no tenderness or masses. hernias: no hernias appreciated. liver: normal size and consistency. spleen: not palpable. rectal: hemoccult/guaiac: not performed. musculoskeletal: digits and nails: not assessed. neurologic: cranial nerves: II-XII normal.   psychiatric: judgement/insight: within normal limits. memory: within normal limits for recent and remote events. mood and affect: no evidence of depression, anxiety or agitation. orientation: oriented to time, space and person.         Basic Metabolic Profile   Recent Labs      06/07/17   0551  06/06/17   1940   NA  137  135*   K  3.2*  3.2*   CL  100  99*   CO2  29  27   BUN  13  16   GLU  160*  228*   CA  8.4*  8.2*   MG   --   1.6         CBC w/Diff    Recent Labs      06/07/17 0551   WBC  7.2   RBC  3.26*   HGB  8.8*   HCT  25.8*   MCV  79.1   MCH  27.0   MCHC  34.1   RDW  16.2*   PLT  249    Recent Labs      06/07/17   0551   GRANS  61   LYMPH  26   EOS  2        Hepatic Function   Recent Labs      06/07/17   0551   ALB  1.9*   TP  6.1*   TBILI  0.5   SGOT  43*   AP  199*          Batsheva Mcqueen MD, M.D. [facility], [address] [place] [address]  www. [email or web address]/[facility]

## 2017-06-08 NOTE — CDMP QUERY
Wound care nurse has assessed this patient and noted the following to be POA;          buttocks   crease tear        left foot   scabbed callous, base  ,    5th toe        right foot callous , base ,   great toe        left foot callous ,   base ,    great toe      If you concur, please  document as written   with POA        Thank you,   Taya Nam RN   CCDS

## 2017-06-08 NOTE — WOUND CARE
Physical Exam   Musculoskeletal:        Legs:       Feet:      Wound Sacral/coccyx Posterior (Active), POA   DRESSING STATUS Intact 6/8/2017 12:01 PM   Non-Pressure Injury Partial thickness (epider/derm) 6/8/2017 12:01 PM   Wound Length (cm) 1.5 cm 6/8/2017 12:01 PM   Wound Width (cm) 0.5 cm 6/8/2017 12:01 PM   Wound Depth (cm) 0.1 6/8/2017 12:01 PM   Wound Surface area (cm^3) 0.08 cm^2 6/8/2017 12:01 PM   Condition of Base [place] 6/8/2017 12:01 PM   Condition of Edges Closed 6/8/2017 12:01 PM   Tissue Type Red 6/8/2017 12:01 PM   Tissue Type Percent Red 100 6/8/2017 12:01 PM   Drainage Amount  None 6/8/2017 12:01 PM   Wound Odor None 6/8/2017 12:01 PM   Periwound Skin Condition Erythema, blanchable 6/8/2017 12:01 PM   Number of days:0       Wound Foot Left;Plantar (Active), POA   DRESSING TYPE Open to air 6/8/2017 12:01 PM   Non-Pressure Injury Partial thickness (epider/derm) 6/8/2017 12:01 PM   Epithelialization (%) 100 6/8/2017 12:01 PM   Drainage Amount  None 6/8/2017 12:01 PM   Wound Odor None 6/8/2017 12:01 PM   Number of days:1       Wound Foot Right;Dorsal (Active), POA   DRESSING TYPE Open to air 6/8/2017 12:01 PM   Non-Pressure Injury Partial thickness (epider/derm) 6/8/2017 12:01 PM   Epithelialization (%) 100 6/8/2017 12:01 PM   Drainage Amount  None 6/8/2017 12:01 PM   Wound Odor None 6/8/2017 12:01 PM   Number of days:1          212: Patient seen by wound care pre consult. Dried callous areas noted to bilateral feet. Midline buttocks crease tear noted. Mepilex border applied for protection. Nursing to resume care.   Eda Haddad, Wound Care Department

## 2017-06-08 NOTE — ROUTINE PROCESS
IDR/SLIDR Summary          Patient: Melecio Yap MRN: [id number]    Age: 77 y.o. YOB: 1950 Room/Bed: [facility]/   Admit Diagnosis: Anemia  Hypervolemia  Anemia  Principal Diagnosis: <principal problem not specified>   Goals:  RETURN TO NH,FIND SOURCE OF ANEMIA  Readmission: NO  Quality Measure: Not applicable  VTE Prophylaxis: Mechanical  Influenza Vaccine screening completed? YES  Pneumococcal Vaccine screening completed? YES  Mobility needs: Yes   Nutrition plan:No  Consults:P.T, O.T. and Case Management    Financial concerns:No  Escalated to CM? YES  RRAT Score: 31   Interventions:Home Health  Testing due for pt today?  YES, POSS COLONSCOPY  LOS: 2 days Expected length of stay 2 days  Discharge plan: RETURN TO NH, AUTUMN   PCP: Meme Choi DO  Transportation needs: Yes    Days before discharge:two or more days before discharge   Discharge disposition: SNF    Signed:     Iram Fernandez RN  6/8/2017  5:06 AM

## 2017-06-08 NOTE — CONSULTS
Infectious Disease Consultation Note    Requested by: Dr. Tasha Sandy    Reason: sepsis    Current abx Prior abx   Ceftriaxone 4/24-5/15; 5/22- till date Fluconazole 5/16-5/30  Meropenem 5/15-5/22  Cefepime 4/20-4/21     Lines:       Assessment :      77 y.o., right handed female, with an established history of hypertension, complete heart block with permanent pacemaker placed, diabetes mellitus, diabetic peripheral neuropathy, obesity, admitted to [facility] on 6/6/2017.      Recent hospitalization (4/20/17-5/15/17) for Sepsis  secondary to group B streptococcus bloodstream infection (positive blood cultures 4/20, negative blood cultures 4/21). Most likely source of bloodstream infection is infected right psoas hematoma/abscess. 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       Enterococcus in urine cultures 4/20 likely colonizer    bilateral LE dvt - s/p IVC filter placement 5/11    Now with low grade fevers, abdominal pain, acute anemia. Highly complex clinical picture. Difficult to determine etiology of patients' symptoms. Differential diagnosis: undiagnosed GI bleed/anemia of chronic disease from spinal osteomyelitis & abdominal pain due to radiculopathy. Rule out right hip osteomyelitis. Pain radiating down to right leg is likely radiculopathy/neuropathy - patient was on neurontin prior to last admission and currently is not on it    patient doesn't have erythema at the site of pacemaker. No fluid collection noted at pacemaker pocket site per CT scan 6/7/17 - discussed with radiologist. At this time risk of removal of pacemaker/general pocket change exceed the benefit.        No evidence of pneumonia. No significant pyuria.      No leukocytosis        Recommendations:     1. continue ceftriaxone  2. Obtain ct abd/pelvis with iv contrast, ct right hip  3.  Restart neurontin  4. Stool occult blood  5. F/u blood cultures  6. Change perez  7. Will obtain bone scan if ct scan non diagnostic to evaluate for osteomyelitis of spine  8. Recommend EGD if etiology of anemia remains unidentified and positive stool occult blood.      Above plan was discussed in details with patient, daughter at bedside. All questions answered to their full satisfaction. Spent additional 35 minutes in management and evaluation of this patient. >50% time spent in counselling and coordination of care. . Please call me if any further questions or concerns. Will continue to participate in the care of this patient. Thank you for consultation request.    HPI:    77 y.o., right handed female, with an established history of hypertension, complete heart block with permanent pacemaker placed, diabetes mellitus, diabetic peripheral neuropathy, obesity, admitted to [facility] on 6/6/2017. She is known to me from recent inpatient consultation at [facility] when she was admitted 4/2017-5/2017. Looking back at her history, she was initially admitted to the hospital in 4/2017 for weakness and pain in the left leg and hip region. She was initially thought to have had pyelonephritis treated as an outpatient. Apparently the pain returned worse admitted once again to the emergency department early April and found to have a psoas mass on the right side. This was thought to be secondary to hematoma consequence of having a supratherapeutic INR. This was felt to be a right iliopsoas intramuscular hematoma. She was treated by reversal of her anticoagulation and given antibiotics. She did not have this drained. It was noted that she was having urinary retention and indwelling Perez catheter was placed at one point. She was subsequently transferred to acute rehab. She had been on the rehab unit for approximately 2 weeks and had been improving in her ambulatory ability.  On 4/19/17, while they were trying to discharge her she noted increasing burning of the right leg. There was no overt weakness of the leg worse than she had had from just her general debility. At that this point that a neurology consultation was requested. She was seen on 4/201/7 by Dr. Caron Castle after a CT of the lumbar spine was obtained showing enlargement of the right iliopsoas mass. He found the patient to be moving the left leg well and not moving the right leg normally with some generalized weakness at about 2/5 documented by him. Around 12 noon, she was noted to have inability to move her right leg. she also was noted to spike a temperature up to 102 Fahrenheit. Her wbc count was noted to be 20k. Spine surgery was reconsulted and she was determined to have acute paraplegia. I was consulted for further recommendations. MRI couldn't be performed since she had pacemaker. Spine surgery felt that there was no indication for surgical intervention. She had ct guided drainage of right psoas abscess - issa pus was noted. Cultures revealed group B streptococcus. She was started on ceftriaxone. Her hospital course was complicated by respiratory failure due to pneumonia. She had intermittent abdominal pain and was evaluated by cardiology. The abdominal pacemaker was not removed since there was no clinical or radiographic evidence of infection noted on serial exams. She also had acute renal failure which gradually improved. She had bilateral LE dvt s/p IVC filter. After receiving short course of meropenem for hcap, she was switched back to ceftriaxone. She was discharged on 5/25/17 with plans to continue iv ceftriaxone till 6/14/17. She came back to [facility] on 6/6/17 with c/o fever, hand tremors, leg pains. She was concerned that she is having sepsis again and she was sent to the ER for evaluation. She states that she has had intermittent shakes since discharge. But she felt worst in the last week with increased abdominal pain, nausea.  Also, she was noted to have decreased h/h and sent to ed. Here she was noted to have h/h 6.9/20.8. Lactate was 1.5. She was continued on ceftriaxone. GI was consulted. No urgent intervention was deemed necessary. She had temp of 100.9 on admission. I have been consulted for further recommendations. Patient doesn't feel any better today compared to day of admission. She complains of upper abdominal pain radiating to right upper abdomen and right back. She has dark stool but not sure if this is from iron or blood. Patient denies headaches, visual disturbances, sore throat, runny nose, earaches, cp, sob, chills, cough, diarrhea, burning micturition, increased pain or weakness in extremities. No known h/o MRSA colonization or infection in the past.        Past Medical History:   Diagnosis Date    Acute paraplegia ([facility] [address] 4/20/2017    Benign hypertensive heart disease with systolic CHF, NYHA class 2 ([facility] [address] 9/5/2012    Biventricular implantable cardioverter-defibrillator in situ 04/28/2005    Upgraded to BiV AICD; gen change 4/2008; pocket revision 10/2009; Abdominal - done on 8/22/2012 by Dr. Yesica Jackson Cardiac cath 08/15/1996    Patent coronaries. Elev LVEDP. EF 50-55%.  Cardiac echocardiogram 06/23/2015    Ltd study. EF 45-50%. Mild, diffuse hypk. Severe apical hypk. No mass or thrombus was clearly identified, although imaging was suboptimal.      Cardiac nuclear imaging test 06/19/2015    Fixed distal apical, distal septal defect more likely due to RV pacing than prior infarct. No ischemia. EF 46%. RWMA c/w RV pacing. Nondiagnostic EKG on pharm stress test.      Cardiovascular lower extremity venous duplex 09/04/2012    Acute, non-occlusive DVT in CFV on right. No DVT on left. No superficial thrombosis bilaterally.  Cardiovascular upper extremity venous duplex 08/27/2012    DVT in axillary vein on left. Left subclavian was not visualized.     Chronic anemia 9/5/2012    Chronic systolic heart failure (HCC)     Decreased calculated glomerular filtration rate (GFR) 3/30/2017    Calculated GFR equivalent to that of CKD stage 3 = 30-59 ml/min    Diabetic neuropathy associated with type 2 diabetes mellitus ([address] 6/28/2011    Difficult airway for intubation 08/22/2012    see anesthesia airway note    Dyslipidemia 6/28/2011    Gout     History of complete heart block 6/28/2011    History of Coumadin therapy     Anticoagulation for DVT of the LUE; Discontinued on 3/30/2017    History of deep venous thrombosis 9/5/2012    Left upper extremity    History of pyelonephritis 3/30/2017    Left bundle branch block (LBBB) on electrocardiogram 6/28/2011    Nonischemic cardiomyopathy ([address] 6/28/2011    Obesity (BMI 35.0-39.9 without comorbidity) ([address] 3/13/2017    Obstructive sleep apnea on CPAP 2/7/2012    Psoas abscess, right ([address] 4/20/2017    Psoas hematoma, right, secondary to anticoagulant therapy 3/30/2017    Type 2 diabetes mellitus with diabetic neuropathy ([address] 6/28/2011       Past Surgical History:   Procedure Laterality Date    HX CARPAL TUNNEL RELEASE  4/07    right     HX CHOLECYSTECTOMY  1994    HX HYSTERECTOMY  1973    HX OTHER SURGICAL  6/11/2012    AICD revision    HX PACEMAKER  4/28/2005    [employer] AICD       Home Medication List    Details   oxyCODONE-acetaminophen (PERCOCET 7.5) 7.5-325 mg per tablet Take 1 Tab by mouth every six (6) hours as needed (for pain level greater than 5/10). Max Daily Amount: 4 Tabs. Indications: Pain  Qty: 20 Tab, Refills: 0      albuterol-ipratropium (DUO-NEB) 2.5 mg-0.5 mg/3 ml nebu 3 mL by Nebulization route four (4) times daily. Qty: 30 Nebule, Refills: 0      allopurinol (ZYLOPRIM) 100 mg tablet Take 0.5 Tabs by mouth daily. Indications: HYPERURICEMIA  Qty: 30 Tab, Refills: 0      cefTRIAXone 2 gram 2 g IVPB 2 g by IntraVENous route every twenty-four (24) hours for 20 days.   Qty: 20 Dose, Refills: 0      midodrine (PROAMITINE) 2.5 mg tablet 5 mg [2 tablets] po three time a day for 5 days then 2.5 mg po three time a day x 5 days then stop  Qty: 45 Tab, Refills: 0      Lactobacillus Acidoph & Bulgar (FLORANEX) 1 million cell tab tablet Take 1 Tab by mouth two (2) times a day for 21 days. Qty: 42 Tab, Refills: 0      aluminum-magnesium hydroxide (MAALOX) 200-200 mg/5 mL suspension Take 15 mL by mouth four (4) times daily as needed for Indigestion. Qty: 100 mL, Refills: 0      folic acid (FOLVITE) 1 mg tablet Take 1 Tab by mouth daily. Qty: 30 Tab, Refills: 0      insulin glargine (LANTUS) 100 unit/mL injection 5 Units by SubCUTAneous route nightly. Indications: type 2 diabetes mellitus  Qty: 1 Vial, Refills: 0    Associated Diagnoses: Type 2 diabetes mellitus with diabetic neuropathy, with long-term current use of insulin (HCC)      ferrous sulfate 325 mg (65 mg iron) tablet Take 1 Tab by mouth two (2) times daily (with meals) for 14 days. Qty: 28 Tab, Refills: 0      famotidine (PEPCID) 20 mg tablet Take 1 Tab by mouth nightly. Qty: 30 Tab, Refills: 0      polyethylene glycol (MIRALAX) 17 gram packet Take 1 Packet by mouth two (2) times a day. Qty: 30 Packet, Refills: 0      cholecalciferol (VITAMIN D3) 1,000 unit tablet Take 2 Tabs by mouth daily. Indications: PREVENTION OF VITAMIN D DEFICIENCY  Qty: 30 Tab, Refills: 0      ondansetron (ZOFRAN ODT) 4 mg disintegrating tablet Take 4 mg by mouth every eight (8) hours as needed for Nausea. acetaminophen (TYLENOL) 325 mg tablet Take 2 Tabs by mouth every four (4) hours as needed (for fever or pain level less than 5/10). Indications: Fever, Pain  Qty: 30 Tab, Refills: 0    Associated Diagnoses: Iliopsoas muscle hematoma, right, subsequent encounter      senna-docusate (PERICOLACE) 8.6-50 mg per tablet Take 2 Tabs by mouth daily (after dinner). Indications: Constipation  Qty: 30 Tab, Refills: 0      pravastatin (PRAVACHOL) 40 mg tablet Take 40 mg by mouth nightly.  Indications: DYSLIPIDEMIA      insulin aspart (NOVOLOG) 100 unit/mL injection INITIATE INSULIN CORRECTIVE PROTOCOL (HR): Normal Insulin Sensitivity  For Blood Sugar (mg/dL) of:    Less than 150 =   0 units          150 -199 =   2 units 200 -249 =   4 units 250 -299 =   6 units 300 -349 =   8 units 350 and above =   10 units If 2 glucose readings are above 200 mg/dL  Qty: 10 mL, Refills: 6             Current Facility-Administered Medications   Medication Dose Route Frequency    alteplase (CATHFLO) 2 mg in sterile water (preservative free) 2 mL injection  2 mg InterCATHeter ONCE    ondansetron (ZOFRAN ODT) tablet 4 mg  4 mg Oral Q8H PRN    acetaminophen (TYLENOL) tablet 650 mg  650 mg Oral Q4H PRN    senna-docusate (PERICOLACE) 8.6-50 mg per tablet 2 Tab  2 Tab Oral PCD    cholecalciferol (VITAMIN D3) tablet 2,000 Units  2,000 Units Oral DAILY    oxyCODONE-acetaminophen (PERCOCET 7.5) 7.5-325 mg per tablet 1 Tab  1 Tab Oral Q6H PRN    allopurinol (ZYLOPRIM) tablet 50 mg  50 mg Oral DAILY    midodrine (PROAMITINE) tablet 2.5 mg  2.5 mg Oral TID WITH MEALS    Lactobacillus Acidoph & Bulgar (FLORANEX) tablet 1 Tab  1 Tab Oral BID    folic acid (FOLVITE) tablet 1 mg  1 mg Oral DAILY    insulin glargine (LANTUS) injection 5 Units  5 Units SubCUTAneous QHS    ferrous sulfate tablet 325 mg  325 mg Oral BID WITH MEALS    famotidine (PEPCID) tablet 20 mg  20 mg Oral QHS    polyethylene glycol (MIRALAX) packet 17 g  17 g Oral BID    cefTRIAXone (ROCEPHIN) 2 g in 0.9% sodium chloride (MBP/ADV) 50 mL MBP  2 g IntraVENous Q24H    aluminum-magnesium hydroxide (MAALOX) oral suspension 15 mL  15 mL Oral QID PRN    sodium chloride (NS) flush 5-10 mL  5-10 mL IntraVENous Q8H    sodium chloride (NS) flush 5-10 mL  5-10 mL IntraVENous PRN    albuterol-ipratropium (DUO-NEB) 2.5 MG-0.5 MG/3 ML  3 mL Nebulization Q4H PRN    magnesium oxide (MAG-OX) tablet 400 mg  400 mg Oral BID    furosemide (LASIX) tablet 40 mg  40 mg Oral DAILY    diphenhydrAMINE (BENADRYL) capsule 25 mg  25 mg Oral BID    insulin lispro (HUMALOG) injection   SubCUTAneous AC&HS    glucose chewable tablet 16 g  16 g Oral PRN    glucagon (GLUCAGEN) injection 1 mg  1 mg IntraMUSCular PRN    dextrose (D50W) injection syrg 12.5-25 g  25-50 mL IntraVENous PRN    alteplase (CATHFLO) 2 mg in sterile water (preservative free) 2 mL injection  2 mg InterCATHeter ONCE    0.9% sodium chloride infusion 250 mL  250 mL IntraVENous PRN       Allergies: Vancomycin; Ampicillin; Bactrim [sulfamethoxazole-trimethoprim]; Blueberry; Ciprofloxacin; Codeine; Crestor [rosuvastatin]; Darvocet a500 [propoxyphene n-acetaminophen]; Demerol [meperidine]; Levaquin [levofloxacin]; Lipitor [atorvastatin]; Magnesium oxide; Minocin [minocycline]; Pcn [penicillins]; Pravachol [pravastatin]; Shellfish derived; Sulfa (sulfonamide antibiotics); Ultracet [tramadol-acetaminophen]; Vicodin [hydrocodone-acetaminophen]; Vytorin 10-10 [ezetimibe-simvastatin]; and Percodan [oxycodone hcl-oxycodone-asa]    Family History   Problem Relation Age of Onset    Cancer Father      Leukemia     Social History     Social History    Marital status:      Spouse name: N/A    Number of children: N/A    Years of education: N/A     Occupational History    Not on file. Social History Main Topics    Smoking status: Never Smoker    Smokeless tobacco: Never Used    Alcohol use No    Drug use: No    Sexual activity: Yes     Partners: Male     Other Topics Concern    Not on file     Social History Narrative     History   Smoking Status    Never Smoker   Smokeless Tobacco    Never Used        Temp (24hrs), Av.5 °F (36.9 °C), Min:98.1 °F (36.7 °C), Max:99.2 °F (37.3 °C)    Visit Vitals    /82 (BP 1 Location: Right arm, BP Patient Position: At rest)    Pulse 92    Temp 98.1 °F (36.7 °C)    Resp 18    Wt 100.2 kg (220 lb 14.4 oz)    SpO2 98%    Breastfeeding No    BMI 34.6 kg/m2       ROS: 12 point ROS obtained in details.  Pertinent positives as mentioned in HPI,   otherwise negative    Physical Exam:    General: Well developed, well nourished female laying on the bed, AAOx3 NAD     General:  awake alert and oriented   HEENT:  Normocephalic, atraumatic, PERRL, EOMI, no scleral icterus or pallor; no conjunctival hemmohage; nasal and oral mucous are moist and without evidence of lesions. Neck supple, no bruits. Lymph Nodes:  no cervical, axillary or inguinal adenopathy   Lungs:  non-labored, bilaterally clear to auscultation- no crackles wheezes rales or rhonchi   Heart:  s1 and s2 irregular; no rubs or gallops, no edema, + pedal pulses   Abdomen: soft, non-distended, active bowel sounds, no hepatomegaly, no splenomegaly. no tenderness over the left abdominal pacemaker, no overlying erythema or fluctuance, epigastric/RUQ tenderness   Genitourinary: Krueger in place   Extremities:  no clubbing, cyanosis; no joint effusions or swelling; muscle mass appropriate for age   Neurologic:  No gross focal sensory abnormalities; 5/5 muscle strength to upper extremities. 0/5 strength in lower extremities.  Cranial nerves intact   Skin:  Surgical scars abdomen, left neck well healed   Back: no paraspinal muscle guarding or rigidity, right CVA tenderness, tenderness over lumbar spine around L4-L5   Psychiatric:  No suicidal or homicidal ideations, appropriate mood and affect          Labs: Results:   Chemistry Recent Labs      06/07/17 0551  06/06/17 1940   GLU  160*  228*   NA  137  135*   K  3.2*  3.2*   CL  100  99*   CO2  29  27   BUN  13  16   CREA  0.71  0.93   CA  8.4*  8.2*   AGAP  8  9   BUCR  18  17   AP  199*  206*   TP  6.1*  6.3*   ALB  1.9*  1.7*   GLOB  4.2*  4.6*   AGRAT  0.5*  0.4*      CBC w/Diff Recent Labs      06/07/17   0551  06/06/17 1940   WBC  7.2  7.2   RBC  3.26*  2.64*   HGB  8.8*  6.9*   HCT  25.8*  20.8*   PLT  249  235   GRANS  61  68   LYMPH  26  22   EOS  2  1      Microbiology Recent Labs      06/06/17 2130 06/06/17 2055   CULT NO GROWTH AFTER 9 HOURS  NO GROWTH AFTER 9 HOURS          RADIOLOGY:    All available imaging studies/reports in [facility] for this admission were reviewed    Dr. Uriah Murphy, Infectious Disease Specialist  [phone]  June 8, 2017  8:43 AM

## 2017-06-08 NOTE — PROGRESS NOTES
Bedside and Verbal shift change report given to Antelmo Padilla RN (oncoming nurse) by Ewa Vaughn RN (offgoing nurse). Report included the following information SBAR, Kardex, Intake/Output, MAR, Recent Results and Cardiac Rhythm Sinus Rhythm.

## 2017-06-08 NOTE — PROGRESS NOTES
Cheryl Singh M.D. PROGRESS NOTE    Name: Charles White MRN: [id number]   : 1950 Hospital: [facility]   Date: 2017  Admission Date: 2017     Subjective/Objective/Plans  1. Anemia. 2. Left upper quadrant pain, etiology unclear. 3. Ischemic cardiomyopathy with pacemaker defibrillator placement. 4. Paraplegia with neuropathic pain. 5. History of deep venous thrombosis right lower extremity. 6. Hypokalemia  7. Hypomagnesemia    Dr Gena Mustafa note reviewed , r/o psoas hematoma infection,   GI has been consulted  VSS    Vital Signs:  Visit Vitals    /76 (BP 1 Location: Left arm, BP Patient Position: At rest)    Pulse 87    Temp 98.2 °F (36.8 °C)    Resp 18    Wt 100.2 kg (220 lb 14.4 oz)    SpO2 97%    Breastfeeding No    BMI 34.6 kg/m2       O2 Device: Room air       Temp (24hrs), Av.5 °F (36.9 °C), Min:98.1 °F (36.7 °C), Max:99.2 °F (37.3 °C)     2:14 PM  Intake/Output:   Last shift:       07 -  190  In: 240 [P.O.:240]  Out: 500 [Urine:500]  Last 3 shifts:  190 -  0700  In: 1410 [P.O.:480]  Out: 2375 [Urine:2375]    Intake/Output Summary (Last 24 hours) at 17 1414  Last data filed at 17 1406   Gross per 24 hour   Intake              480 ml   Output             1200 ml   Net             -720 ml         Physical Exam:    General: in no apparent distress, in no respiratory distress and acyanotic and alert    HEENT: pupils equal, no ear discharge   Neck: No abnormally enlarged lymph nodes. , no JDV   Mouth: MMM no lesions    Chest: normal, no breast masses   Lungs: decreased air exchange bilaterally   Heart: Regular rate and rhythm   Abdomen: abdomen is soft without significant tenderness, masses, organomegaly or guarding   Extremity: paraplegia   Neuro: alert    Skin: Skin color, texture, turgor normal. No rashes or lesions    Data Review:    Labs: Results:       Chemistry Recent Labs      17   0551  17   1940   GLU  160*  228* NA  137  135*   K  3.2*  3.2*   CL  100  99*   CO2  29  27   BUN  13  16   CREA  0.71  0.93   CA  8.4*  8.2*   AGAP  8  9   BUCR  18  17   TBILI  0.5  0.3   AP  199*  206*   TP  6.1*  6.3*   ALB  1.9*  1.7*   GLOB  4.2*  4.6*   AGRAT  0.5*  0.4*      CBC w/Diff Recent Labs      06/08/17   0945  06/07/17   0551  06/06/17 1940   WBC  7.1  7.2  7.2   RBC  3.41*  3.26*  2.64*   HGB  9.3*  8.8*  6.9*   HCT  27.4*  25.8*  20.8*   PLT  284  249  235   GRANS  61  61  68   LYMPH  27  26  22   EOS  2  2  1      Cardiac Enzymes Recent Labs      06/06/17 1940   CPK  33   CKND1  3.3      Coagulation Recent Labs      06/06/17 1940   PTP  15.1   INR  1.2   APTT  42.6*       Lipid Panel Lab Results   Component Value Date/Time    Cholesterol, total 154 07/24/2012 01:00 AM    HDL Cholesterol 48 07/24/2012 01:00 AM    LDL, calculated 90.4 07/24/2012 01:00 AM    VLDL, calculated 15.6 07/24/2012 01:00 AM    Triglyceride 78 07/24/2012 01:00 AM    CHOL/HDL Ratio 3.2 07/24/2012 01:00 AM      BNP No results for input(s): BNPP in the last 72 hours. Liver Enzymes Recent Labs      06/07/17   0551   TP  6.1*   ALB  1.9*   TBILI  0.5   AP  199*   SGOT  43*   ALT  30      Thyroid Studies Lab Results   Component Value Date/Time    TSH 0.51 04/20/2017 07:26 PM          Procedures/imaging: see electronic medical records for all procedures, Xrays and details which were not copied into this note but were reviewed. Allergies:   Allergies   Allergen Reactions    Vancomycin Itching    Ampicillin Itching    Bactrim [Sulfamethoxazole-Trimethoprim] Unknown (comments)    Blueberry Swelling     Causes throat swelling    Ciprofloxacin Itching    Codeine Other (comments)     Jumpy feeling    Crestor [Rosuvastatin] Itching    Darvocet A500 [Propoxyphene N-Acetaminophen] Itching    Demerol [Meperidine] Itching    Levaquin [Levofloxacin] Itching    Lipitor [Atorvastatin] Myalgia    Magnesium Oxide Itching     nausea    Minocin [Minocycline] Unknown (comments)    Pcn [Penicillins] Itching    Pravachol [Pravastatin] Swelling     Swelling in mouth     Shellfish Derived Unknown (comments)    Sulfa (Sulfonamide Antibiotics) Itching    Ultracet [Tramadol-Acetaminophen] Itching    Vicodin [Hydrocodone-Acetaminophen] Unknown (comments)    Vytorin 10-10 [Ezetimibe-Simvastatin] Myalgia    Percodan [Oxycodone Hcl-Oxycodone-Asa] Itching       Home Medications:  Prior to Admission Medications   Prescriptions Last Dose Informant Patient Reported? Taking? Lactobacillus Acidoph & Bulgar (FLORANEX) 1 million cell tab tablet   No No   Sig: Take 1 Tab by mouth two (2) times a day for 21 days. acetaminophen (TYLENOL) 325 mg tablet   No No   Sig: Take 2 Tabs by mouth every four (4) hours as needed (for fever or pain level less than 5/10). Indications: Fever, Pain   albuterol-ipratropium (DUO-NEB) 2.5 mg-0.5 mg/3 ml nebu   No No   Sig: 3 mL by Nebulization route four (4) times daily. allopurinol (ZYLOPRIM) 100 mg tablet   No No   Sig: Take 0.5 Tabs by mouth daily. Indications: HYPERURICEMIA   aluminum-magnesium hydroxide (MAALOX) 200-200 mg/5 mL suspension   No No   Sig: Take 15 mL by mouth four (4) times daily as needed for Indigestion. cefTRIAXone 2 gram 2 g IVPB   No No   Si g by IntraVENous route every twenty-four (24) hours for 20 days. cholecalciferol (VITAMIN D3) 1,000 unit tablet   No No   Sig: Take 2 Tabs by mouth daily. Indications: PREVENTION OF VITAMIN D DEFICIENCY   famotidine (PEPCID) 20 mg tablet   No No   Sig: Take 1 Tab by mouth nightly. ferrous sulfate 325 mg (65 mg iron) tablet   No No   Sig: Take 1 Tab by mouth two (2) times daily (with meals) for 14 days. folic acid (FOLVITE) 1 mg tablet   No No   Sig: Take 1 Tab by mouth daily.    insulin aspart (NOVOLOG) 100 unit/mL injection   No No   Sig: INITIATE INSULIN CORRECTIVE PROTOCOL (HR): Normal Insulin Sensitivity  For Blood Sugar (mg/dL) of:    Less than 150 =   0 units          150 -199 =   2 units 200 -249 =   4 units 250 -299 =   6 units 300 -349 =   8 units 350 and above =   10 units If 2 glucose readings are above 200 mg/dL   insulin glargine (LANTUS) 100 unit/mL injection   No No   Si Units by SubCUTAneous route nightly. Indications: type 2 diabetes mellitus   midodrine (PROAMITINE) 2.5 mg tablet   No No   Si mg [2 tablets] po three time a day for 5 days then 2.5 mg po three time a day x 5 days then stop   ondansetron (ZOFRAN ODT) 4 mg disintegrating tablet   Yes No   Sig: Take 4 mg by mouth every eight (8) hours as needed for Nausea. oxyCODONE-acetaminophen (PERCOCET 7.5) 7.5-325 mg per tablet   No No   Sig: Take 1 Tab by mouth every six (6) hours as needed (for pain level greater than 5/10). Max Daily Amount: 4 Tabs. Indications: Pain   polyethylene glycol (MIRALAX) 17 gram packet   No No   Sig: Take 1 Packet by mouth two (2) times a day. pravastatin (PRAVACHOL) 40 mg tablet   Yes No   Sig: Take 40 mg by mouth nightly. Indications: DYSLIPIDEMIA   senna-docusate (PERICOLACE) 8.6-50 mg per tablet   No No   Sig: Take 2 Tabs by mouth daily (after dinner).  Indications: Constipation      Facility-Administered Medications: None       Current Medications:  Current Facility-Administered Medications   Medication Dose Route Frequency    gabapentin (NEURONTIN) capsule 600 mg  600 mg Oral BID    ondansetron (ZOFRAN ODT) tablet 4 mg  4 mg Oral Q8H PRN    acetaminophen (TYLENOL) tablet 650 mg  650 mg Oral Q4H PRN    senna-docusate (PERICOLACE) 8.6-50 mg per tablet 2 Tab  2 Tab Oral PCD    cholecalciferol (VITAMIN D3) tablet 2,000 Units  2,000 Units Oral DAILY    oxyCODONE-acetaminophen (PERCOCET 7.5) 7.5-325 mg per tablet 1 Tab  1 Tab Oral Q6H PRN    allopurinol (ZYLOPRIM) tablet 50 mg  50 mg Oral DAILY    midodrine (PROAMITINE) tablet 2.5 mg  2.5 mg Oral TID WITH MEALS    Lactobacillus Acidoph & Bulgar (FLORANEX) tablet 1 Tab  1 Tab Oral BID    folic acid (FOLVITE) tablet 1 mg  1 mg Oral DAILY    insulin glargine (LANTUS) injection 5 Units  5 Units SubCUTAneous QHS    ferrous sulfate tablet 325 mg  325 mg Oral BID WITH MEALS    famotidine (PEPCID) tablet 20 mg  20 mg Oral QHS    polyethylene glycol (MIRALAX) packet 17 g  17 g Oral BID    cefTRIAXone (ROCEPHIN) 2 g in 0.9% sodium chloride (MBP/ADV) 50 mL MBP  2 g IntraVENous Q24H    aluminum-magnesium hydroxide (MAALOX) oral suspension 15 mL  15 mL Oral QID PRN    sodium chloride (NS) flush 5-10 mL  5-10 mL IntraVENous Q8H    sodium chloride (NS) flush 5-10 mL  5-10 mL IntraVENous PRN    albuterol-ipratropium (DUO-NEB) 2.5 MG-0.5 MG/3 ML  3 mL Nebulization Q4H PRN    magnesium oxide (MAG-OX) tablet 400 mg  400 mg Oral BID    furosemide (LASIX) tablet 40 mg  40 mg Oral DAILY    diphenhydrAMINE (BENADRYL) capsule 25 mg  25 mg Oral BID    insulin lispro (HUMALOG) injection   SubCUTAneous AC&HS    glucose chewable tablet 16 g  16 g Oral PRN    glucagon (GLUCAGEN) injection 1 mg  1 mg IntraMUSCular PRN    dextrose (D50W) injection syrg 12.5-25 g  25-50 mL IntraVENous PRN    0.9% sodium chloride infusion 250 mL  250 mL IntraVENous PRN       Chart and notes reviewed. Data reviewed. I have evaluated and examined the patient. IMPRESSION:   Patient Active Problem List   Diagnosis Code    Nonischemic cardiomyopathy ([facility] I42.8    History of complete heart block Z86.79    Biventricular implantable cardioverter-defibrillator in situ Z95.810    Left bundle branch block (LBBB) on electrocardiogram I44.7    Type 2 diabetes mellitus with diabetic neuropathy ([facility]) E11.40    Dyslipidemia E78.5    Diabetic neuropathy associated with type 2 diabetes mellitus ([email or web address] E11.40    Obstructive sleep apnea on CPAP G47.33, Z99.89    AICD generator infection ([facility] T82. 7XXA    Difficult airway for intubation T88. 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 1XXA    Impaired mobility and ADLs Z74.09    History of Coumadin therapy Z79.01    Gout M10.9    Acute paraplegia ([facility]) G82.20    Sepsis ([address] A41.9    Psoas abscess, right ([address] K68.12    Krueger catheter in place on admission Z96.0    Urinary tract infection due to Enterococcus N39.0, B95.2    Group B streptococcal infection A49.1    Hypervolemia E87.70    Anemia D64.9 ·         PLAN:/DISCUSSION:   · Continue KCL, Mag  · Consulted JENNIFER Yuong and Dr Shari Polanco MD  6/8/2017, 2:14 PM

## 2017-06-08 NOTE — ROUTINE PROCESS
Bedside shift change report given to Sony Petersen RN (oncoming nurse) by Mari CRANE RN (offgoing nurse). Report included the following information SBAR, Kardex, Intake/Output, MAR, Recent Results and Alarm Parameters .

## 2017-06-08 NOTE — ROUTINE PROCESS
0435 PICC found pulled out, length witnessed by 2 RNs, 27 cm. 2x2 pressure drgs applied. Peripheral IV started, will notify .  0700 Dr Bo Roe in at bs, aware PICC line out, no further orders.

## 2017-06-09 LAB
AMYLASE SERPL-CCNC: 44 U/L (ref 25–115)
ANION GAP BLD CALC-SCNC: 7 MMOL/L (ref 3–18)
BASOPHILS # BLD AUTO: 0.1 K/UL (ref 0–0.06)
BASOPHILS # BLD: 1 % (ref 0–2)
BUN SERPL-MCNC: 10 MG/DL (ref 7–18)
BUN/CREAT SERPL: 13 (ref 12–20)
CALCIUM SERPL-MCNC: 9 MG/DL (ref 8.5–10.1)
CHLORIDE SERPL-SCNC: 101 MMOL/L (ref 100–108)
CO2 SERPL-SCNC: 28 MMOL/L (ref 21–32)
CREAT SERPL-MCNC: 0.76 MG/DL (ref 0.6–1.3)
DIFFERENTIAL METHOD BLD: ABNORMAL
EOSINOPHIL # BLD: 0.2 K/UL (ref 0–0.4)
EOSINOPHIL NFR BLD: 3 % (ref 0–5)
ERYTHROCYTE [DISTWIDTH] IN BLOOD BY AUTOMATED COUNT: 16.6 % (ref 11.6–14.5)
GLUCOSE BLD STRIP.AUTO-MCNC: 123 MG/DL (ref 70–110)
GLUCOSE BLD STRIP.AUTO-MCNC: 157 MG/DL (ref 70–110)
GLUCOSE BLD STRIP.AUTO-MCNC: 193 MG/DL (ref 70–110)
GLUCOSE BLD STRIP.AUTO-MCNC: 251 MG/DL (ref 70–110)
GLUCOSE SERPL-MCNC: 95 MG/DL (ref 74–99)
HCT VFR BLD AUTO: 33.2 % (ref 35–45)
HGB BLD-MCNC: 10.9 G/DL (ref 12–16)
LIPASE SERPL-CCNC: 136 U/L (ref 73–393)
LYMPHOCYTES # BLD AUTO: 29 % (ref 21–52)
LYMPHOCYTES # BLD: 2 K/UL (ref 0.9–3.6)
MAGNESIUM SERPL-MCNC: 1.8 MG/DL (ref 1.6–2.6)
MCH RBC QN AUTO: 26.8 PG (ref 24–34)
MCHC RBC AUTO-ENTMCNC: 32.8 G/DL (ref 31–37)
MCV RBC AUTO: 81.8 FL (ref 74–97)
MONOCYTES # BLD: 0.8 K/UL (ref 0.05–1.2)
MONOCYTES NFR BLD AUTO: 11 % (ref 3–10)
NEUTS SEG # BLD: 4 K/UL (ref 1.8–8)
NEUTS SEG NFR BLD AUTO: 56 % (ref 40–73)
PLATELET # BLD AUTO: 251 K/UL (ref 135–420)
PMV BLD AUTO: 8.8 FL (ref 9.2–11.8)
POTASSIUM SERPL-SCNC: 3.9 MMOL/L (ref 3.5–5.5)
RBC # BLD AUTO: 4.06 M/UL (ref 4.2–5.3)
SODIUM SERPL-SCNC: 136 MMOL/L (ref 136–145)
WBC # BLD AUTO: 7 K/UL (ref 4.6–13.2)

## 2017-06-09 PROCEDURE — 74011250637 HC RX REV CODE- 250/637: Performed by: INTERNAL MEDICINE

## 2017-06-09 PROCEDURE — 74011250636 HC RX REV CODE- 250/636: Performed by: FAMILY MEDICINE

## 2017-06-09 PROCEDURE — 74011636637 HC RX REV CODE- 636/637: Performed by: FAMILY MEDICINE

## 2017-06-09 PROCEDURE — 36415 COLL VENOUS BLD VENIPUNCTURE: CPT | Performed by: FAMILY MEDICINE

## 2017-06-09 PROCEDURE — 85025 COMPLETE CBC W/AUTO DIFF WBC: CPT | Performed by: FAMILY MEDICINE

## 2017-06-09 PROCEDURE — 77030033263 HC DRSG MEPILEX 16-48IN BORD MOLN -B

## 2017-06-09 PROCEDURE — 82150 ASSAY OF AMYLASE: CPT | Performed by: PSYCHIATRY & NEUROLOGY

## 2017-06-09 PROCEDURE — 82962 GLUCOSE BLOOD TEST: CPT

## 2017-06-09 PROCEDURE — 74011000258 HC RX REV CODE- 258: Performed by: FAMILY MEDICINE

## 2017-06-09 PROCEDURE — 65660000000 HC RM CCU STEPDOWN

## 2017-06-09 PROCEDURE — 74011250637 HC RX REV CODE- 250/637: Performed by: FAMILY MEDICINE

## 2017-06-09 PROCEDURE — 83690 ASSAY OF LIPASE: CPT | Performed by: PSYCHIATRY & NEUROLOGY

## 2017-06-09 PROCEDURE — 83735 ASSAY OF MAGNESIUM: CPT | Performed by: FAMILY MEDICINE

## 2017-06-09 PROCEDURE — 80048 BASIC METABOLIC PNL TOTAL CA: CPT | Performed by: FAMILY MEDICINE

## 2017-06-09 RX ORDER — FUROSEMIDE 10 MG/ML
40 INJECTION INTRAMUSCULAR; INTRAVENOUS DAILY
Status: DISCONTINUED | OUTPATIENT
Start: 2017-06-10 | End: 2017-06-15

## 2017-06-09 RX ORDER — FUROSEMIDE 40 MG/1
40 TABLET ORAL ONCE
Status: COMPLETED | OUTPATIENT
Start: 2017-06-09 | End: 2017-06-09

## 2017-06-09 RX ADMIN — Medication 400 MG: at 19:18

## 2017-06-09 RX ADMIN — Medication 10 ML: at 17:06

## 2017-06-09 RX ADMIN — DIPHENHYDRAMINE HYDROCHLORIDE 25 MG: 25 CAPSULE ORAL at 17:44

## 2017-06-09 RX ADMIN — DIPHENHYDRAMINE HYDROCHLORIDE 25 MG: 25 CAPSULE ORAL at 08:46

## 2017-06-09 RX ADMIN — FUROSEMIDE 40 MG: 40 TABLET ORAL at 08:46

## 2017-06-09 RX ADMIN — LACTOBACILLUS TAB 1 TABLET: TAB at 12:34

## 2017-06-09 RX ADMIN — VITAMIN D, TAB 1000IU (100/BT) 2000 UNITS: 25 TAB at 09:02

## 2017-06-09 RX ADMIN — POTASSIUM CHLORIDE 20 MEQ: 20 TABLET, EXTENDED RELEASE ORAL at 08:46

## 2017-06-09 RX ADMIN — FUROSEMIDE 40 MG: 40 TABLET ORAL at 19:18

## 2017-06-09 RX ADMIN — FAMOTIDINE 20 MG: 20 TABLET ORAL at 22:10

## 2017-06-09 RX ADMIN — POTASSIUM CHLORIDE 20 MEQ: 20 TABLET, EXTENDED RELEASE ORAL at 17:44

## 2017-06-09 RX ADMIN — MIDODRINE HYDROCHLORIDE 2.5 MG: 2.5 TABLET ORAL at 08:47

## 2017-06-09 RX ADMIN — MIDODRINE HYDROCHLORIDE 2.5 MG: 2.5 TABLET ORAL at 12:34

## 2017-06-09 RX ADMIN — ALLOPURINOL 50 MG: 100 TABLET ORAL at 08:45

## 2017-06-09 RX ADMIN — INSULIN GLARGINE 5 UNITS: 100 INJECTION, SOLUTION SUBCUTANEOUS at 22:09

## 2017-06-09 RX ADMIN — FERROUS SULFATE TAB 325 MG (65 MG ELEMENTAL FE) 325 MG: 325 (65 FE) TAB at 17:44

## 2017-06-09 RX ADMIN — GABAPENTIN 600 MG: 300 CAPSULE ORAL at 17:44

## 2017-06-09 RX ADMIN — Medication 10 ML: at 23:53

## 2017-06-09 RX ADMIN — LACTOBACILLUS TAB 1 TABLET: TAB at 17:43

## 2017-06-09 RX ADMIN — STANDARDIZED SENNA CONCENTRATE AND DOCUSATE SODIUM 2 TABLET: 8.6; 5 TABLET, FILM COATED ORAL at 17:43

## 2017-06-09 RX ADMIN — FERROUS SULFATE TAB 325 MG (65 MG ELEMENTAL FE) 325 MG: 325 (65 FE) TAB at 08:45

## 2017-06-09 RX ADMIN — INSULIN LISPRO 2 UNITS: 100 INJECTION, SOLUTION INTRAVENOUS; SUBCUTANEOUS at 17:41

## 2017-06-09 RX ADMIN — CEFTRIAXONE 2 G: 2 INJECTION, POWDER, FOR SOLUTION INTRAMUSCULAR; INTRAVENOUS at 00:01

## 2017-06-09 RX ADMIN — GABAPENTIN 600 MG: 300 CAPSULE ORAL at 09:02

## 2017-06-09 RX ADMIN — INSULIN LISPRO 2 UNITS: 100 INJECTION, SOLUTION INTRAVENOUS; SUBCUTANEOUS at 12:42

## 2017-06-09 RX ADMIN — Medication 400 MG: at 12:34

## 2017-06-09 RX ADMIN — FOLIC ACID 1 MG: 1 TABLET ORAL at 08:46

## 2017-06-09 RX ADMIN — INSULIN LISPRO 6 UNITS: 100 INJECTION, SOLUTION INTRAVENOUS; SUBCUTANEOUS at 22:10

## 2017-06-09 RX ADMIN — MIDODRINE HYDROCHLORIDE 2.5 MG: 2.5 TABLET ORAL at 17:44

## 2017-06-09 NOTE — PROGRESS NOTES
Selma Cuadra M.D. PROGRESS NOTE    Name: Prince Becker MRN: [id number]   : 1950 Hospital: [facility]   Date: 2017  Admission Date: 2017     Subjective/Objective/Plans  1. Anemia. 2. Left upper quadrant pain, etiology unclear. 3. Ischemic cardiomyopathy with pacemaker defibrillator placement. 4. Paraplegia with neuropathic pain. 5. History of deep venous thrombosis right lower extremity. 6. Hypokalemia  7. Hypomagnesemia    Feeling bloated , she feel she has a lot of fluid  Edema less from yesterday  Will add another Lasix now and  Give IV lasix start in am  Cardiology consult in am  Vital Signs:  Visit Vitals    /84 (BP 1 Location: Right arm, BP Patient Position: At rest)    Pulse 96    Temp 99.1 °F (37.3 °C)    Resp 20    Wt 100 kg (220 lb 7.4 oz)  Comment: (bed scale inoperable)    SpO2 96%    Breastfeeding No    BMI 34.53 kg/m2       O2 Device: Room air       Temp (24hrs), Av.5 °F (36.9 °C), Min:97.9 °F (36.6 °C), Max:99.1 °F (37.3 °C)     5:28 PM  Intake/Output:   Last shift:      701 -  190  In: 360 [P.O.:360]  Out: -   Last 3 shifts:  190 -  0700  In: 510 [P.O.:360; I.V.:150]  Out: 3000 [Urine:3000]    Intake/Output Summary (Last 24 hours) at 17 1728  Last data filed at 17 1328   Gross per 24 hour   Intake              630 ml   Output              900 ml   Net             -270 ml         Physical Exam:    General: in no apparent distress    HEENT: pupils equal, no ear discharge   Neck: No abnormally enlarged lymph nodes. , no JDV   Mouth: MMM no lesions    Chest: normal, no breast masses   Lungs: normal air entry   Heart: Regular rate and rhythm   Abdomen: abdomen is soft without significant tenderness, masses, organomegaly or guarding   Extremity: paraplegia.  1+ edema   Neuro: alert    Skin: Skin color, texture, turgor normal. No rashes or lesions    Data Review:    Labs: Results:       Chemistry Recent Labs 06/09/17 0420 06/07/17   0551  06/06/17 1940   GLU  95  160*  228*   NA  136  137  135*   K  3.9  3.2*  3.2*   CL  101  100  99*   CO2  28  29  27   BUN  10  13  16   CREA  0.76  0.71  0.93   CA  9.0  8.4*  8.2*   AGAP  7  8  9   BUCR  13  18  17   TBILI   --   0.5  0.3   AP   --   199*  206*   TP   --   6.1*  6.3*   ALB   --   1.9*  1.7*   GLOB   --   4.2*  4.6*   AGRAT   --   0.5*  0.4*      CBC w/Diff Recent Labs      06/09/17 0420 06/08/17   0945  06/07/17   0551   WBC  7.0  7.1  7.2   RBC  4.06*  3.41*  3.26*   HGB  10.9*  9.3*  8.8*   HCT  33.2*  27.4*  25.8*   PLT  251  284  249   GRANS  56  61  61   LYMPH  29  27  26   EOS  3  2  2      Cardiac Enzymes Recent Labs      06/06/17 1940   CPK  33   CKND1  3.3      Coagulation Recent Labs      06/06/17 1940   PTP  15.1   INR  1.2   APTT  42.6*       Lipid Panel Lab Results   Component Value Date/Time    Cholesterol, total 154 07/24/2012 01:00 AM    HDL Cholesterol 48 07/24/2012 01:00 AM    LDL, calculated 90.4 07/24/2012 01:00 AM    VLDL, calculated 15.6 07/24/2012 01:00 AM    Triglyceride 78 07/24/2012 01:00 AM    CHOL/HDL Ratio 3.2 07/24/2012 01:00 AM      BNP No results for input(s): BNPP in the last 72 hours. Liver Enzymes Recent Labs      06/07/17   0551   TP  6.1*   ALB  1.9*   TBILI  0.5   AP  199*   SGOT  43*   ALT  30      Thyroid Studies Lab Results   Component Value Date/Time    TSH 0.51 04/20/2017 07:26 PM          Procedures/imaging: see electronic medical records for all procedures, Xrays and details which were not copied into this note but were reviewed. Allergies:   Allergies   Allergen Reactions    Vancomycin Itching    Ampicillin Itching    Bactrim [Sulfamethoxazole-Trimethoprim] Unknown (comments)    Blueberry Swelling     Causes throat swelling    Ciprofloxacin Itching    Codeine Other (comments)     Jumpy feeling    Crestor [Rosuvastatin] Itching    Darvocet A500 [Propoxyphene N-Acetaminophen] Itching    Demerol [Meperidine] Itching    Levaquin [Levofloxacin] Itching    Lipitor [Atorvastatin] Myalgia    Magnesium Oxide Itching     nausea    Minocin [Minocycline] Unknown (comments)    Pcn [Penicillins] Itching    Pravachol [Pravastatin] Swelling     Swelling in mouth     Shellfish Derived Unknown (comments)    Sulfa (Sulfonamide Antibiotics) Itching    Ultracet [Tramadol-Acetaminophen] Itching    Vicodin [Hydrocodone-Acetaminophen] Unknown (comments)    Vytorin 10-10 [Ezetimibe-Simvastatin] Myalgia    Percodan [Oxycodone Hcl-Oxycodone-Asa] Itching       Home Medications:  Prior to Admission Medications   Prescriptions Last Dose Informant Patient Reported? Taking? Lactobacillus Acidoph & Bulgar (FLORANEX) 1 million cell tab tablet   No No   Sig: Take 1 Tab by mouth two (2) times a day for 21 days. acetaminophen (TYLENOL) 325 mg tablet   No No   Sig: Take 2 Tabs by mouth every four (4) hours as needed (for fever or pain level less than 5/10). Indications: Fever, Pain   albuterol-ipratropium (DUO-NEB) 2.5 mg-0.5 mg/3 ml nebu   No No   Sig: 3 mL by Nebulization route four (4) times daily. allopurinol (ZYLOPRIM) 100 mg tablet   No No   Sig: Take 0.5 Tabs by mouth daily. Indications: HYPERURICEMIA   aluminum-magnesium hydroxide (MAALOX) 200-200 mg/5 mL suspension   No No   Sig: Take 15 mL by mouth four (4) times daily as needed for Indigestion. cefTRIAXone 2 gram 2 g IVPB   No No   Si g by IntraVENous route every twenty-four (24) hours for 20 days. cholecalciferol (VITAMIN D3) 1,000 unit tablet   No No   Sig: Take 2 Tabs by mouth daily. Indications: PREVENTION OF VITAMIN D DEFICIENCY   famotidine (PEPCID) 20 mg tablet   No No   Sig: Take 1 Tab by mouth nightly. ferrous sulfate 325 mg (65 mg iron) tablet   No No   Sig: Take 1 Tab by mouth two (2) times daily (with meals) for 14 days. folic acid (FOLVITE) 1 mg tablet   No No   Sig: Take 1 Tab by mouth daily.    insulin aspart (NOVOLOG) 100 unit/mL injection   No No   Sig: INITIATE INSULIN CORRECTIVE PROTOCOL (HR): Normal Insulin Sensitivity  For Blood Sugar (mg/dL) of:    Less than 150 =   0 units          150 -199 =   2 units 200 -249 =   4 units 250 -299 =   6 units 300 -349 =   8 units 350 and above =   10 units If 2 glucose readings are above 200 mg/dL   insulin glargine (LANTUS) 100 unit/mL injection   No No   Si Units by SubCUTAneous route nightly. Indications: type 2 diabetes mellitus   midodrine (PROAMITINE) 2.5 mg tablet   No No   Si mg [2 tablets] po three time a day for 5 days then 2.5 mg po three time a day x 5 days then stop   ondansetron (ZOFRAN ODT) 4 mg disintegrating tablet   Yes No   Sig: Take 4 mg by mouth every eight (8) hours as needed for Nausea. oxyCODONE-acetaminophen (PERCOCET 7.5) 7.5-325 mg per tablet   No No   Sig: Take 1 Tab by mouth every six (6) hours as needed (for pain level greater than 5/10). Max Daily Amount: 4 Tabs. Indications: Pain   polyethylene glycol (MIRALAX) 17 gram packet   No No   Sig: Take 1 Packet by mouth two (2) times a day. pravastatin (PRAVACHOL) 40 mg tablet   Yes No   Sig: Take 40 mg by mouth nightly. Indications: DYSLIPIDEMIA   senna-docusate (PERICOLACE) 8.6-50 mg per tablet   No No   Sig: Take 2 Tabs by mouth daily (after dinner).  Indications: Constipation      Facility-Administered Medications: None       Current Medications:  Current Facility-Administered Medications   Medication Dose Route Frequency    gabapentin (NEURONTIN) capsule 600 mg  600 mg Oral BID    potassium chloride (K-DUR, KLOR-CON) SR tablet 20 mEq  20 mEq Oral BID    ondansetron (ZOFRAN ODT) tablet 4 mg  4 mg Oral Q8H PRN    acetaminophen (TYLENOL) tablet 650 mg  650 mg Oral Q4H PRN    senna-docusate (PERICOLACE) 8.6-50 mg per tablet 2 Tab  2 Tab Oral PCD    cholecalciferol (VITAMIN D3) tablet 2,000 Units  2,000 Units Oral DAILY    oxyCODONE-acetaminophen (PERCOCET 7.5) 7.5-325 mg per tablet 1 Tab  1 Tab Oral Q6H PRN  allopurinol (ZYLOPRIM) tablet 50 mg  50 mg Oral DAILY    midodrine (PROAMITINE) tablet 2.5 mg  2.5 mg Oral TID WITH MEALS    Lactobacillus Acidoph & Bulgar (FLORANEX) tablet 1 Tab  1 Tab Oral BID    folic acid (FOLVITE) tablet 1 mg  1 mg Oral DAILY    insulin glargine (LANTUS) injection 5 Units  5 Units SubCUTAneous QHS    ferrous sulfate tablet 325 mg  325 mg Oral BID WITH MEALS    famotidine (PEPCID) tablet 20 mg  20 mg Oral QHS    polyethylene glycol (MIRALAX) packet 17 g  17 g Oral BID    cefTRIAXone (ROCEPHIN) 2 g in 0.9% sodium chloride (MBP/ADV) 50 mL MBP  2 g IntraVENous Q24H    aluminum-magnesium hydroxide (MAALOX) oral suspension 15 mL  15 mL Oral QID PRN    sodium chloride (NS) flush 5-10 mL  5-10 mL IntraVENous Q8H    sodium chloride (NS) flush 5-10 mL  5-10 mL IntraVENous PRN    albuterol-ipratropium (DUO-NEB) 2.5 MG-0.5 MG/3 ML  3 mL Nebulization Q4H PRN    magnesium oxide (MAG-OX) tablet 400 mg  400 mg Oral BID    furosemide (LASIX) tablet 40 mg  40 mg Oral DAILY    diphenhydrAMINE (BENADRYL) capsule 25 mg  25 mg Oral BID    insulin lispro (HUMALOG) injection   SubCUTAneous AC&HS    glucose chewable tablet 16 g  16 g Oral PRN    glucagon (GLUCAGEN) injection 1 mg  1 mg IntraMUSCular PRN    dextrose (D50W) injection syrg 12.5-25 g  25-50 mL IntraVENous PRN    0.9% sodium chloride infusion 250 mL  250 mL IntraVENous PRN       Chart and notes reviewed. Data reviewed. I have evaluated and examined the patient.         IMPRESSION:   Patient Active Problem List   Diagnosis Code    Nonischemic cardiomyopathy ([facility] I42.8    History of complete heart block Z86.79    Biventricular implantable cardioverter-defibrillator in situ Z95.810    Left bundle branch block (LBBB) on electrocardiogram I44.7    Type 2 diabetes mellitus with diabetic neuropathy (HCC) E11.40    Dyslipidemia E78.5    Diabetic neuropathy associated with type 2 diabetes mellitus ([facility] E11.40    Obstructive sleep apnea on CPAP G47.33, Z99.89    AICD generator infection ([address] T82. 7XXA    Difficult airway for intubation T88. 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 1XXA    Impaired mobility and ADLs Z74.09    History of Coumadin therapy Z79.01    Gout M10.9    Acute paraplegia (HCC) G82.20    Sepsis ([address] A41.9    Psoas abscess, right ([address] K68.12    Krueger catheter in place on admission Z96.0    Urinary tract infection due to Enterococcus N39.0, B95.2    Group B streptococcal infection A49.1    Hypervolemia E87.70    Anemia D64.9 ·         PLAN:/DISCUSSION:   · Discussed with dr Sima Jenkins MD  6/9/2017, 5:28 PM

## 2017-06-09 NOTE — CONSULTS
[address]    Name:  Mart Lange  MR#:  [id number]  :  1950  Account #:  [de-identified]  Date of Adm:  2017  Date of Consultation:  2017      REASON FOR CONSULTATION REQUEST: Evaluate from a  neurologic perspective; the patient with suspected infection. HISTORY OF PRESENT ILLNESS: The patient known to Neurology  Division from previous hospitalization. 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 The  patient has had episodes from her perspective of fever over the last  few days and increased abdominal pain. Abdominal pain is in the  epigastric area, more to the left, and in the area of her cardiac  defibrillator. The patient reports some painful sensation in the leg. She  has been trying to go through rehab and try to recover strength. She  has developed some improvement in regard to strength in the leg, but  certainly nothing to suggest she was able to walk again. She has had  some constipation, but had a large bowel movement today. PAST MEDICAL HISTORY: Otherwise unchanged. SOCIAL HISTORY: Otherwise unchanged. FAMILY HISTORY: Otherwise unchanged. MEDICATIONS: Reviewed. The patient is on Zofran and narcotics,  both of which can cause constipation. PHYSICAL EXAMINATION  CONSTITUTIONAL: Shows the patient is awake and alert, pleasant  and appropriate. VITAL SIGNS: The patient has been afebrile, current temperature 97.9  Fahrenheit with a pulse that is regular at 81, respiratory rate  documented at 22 breaths per minute. The patient did not appear  tachypneic at the bedside. Blood pressure 147/73. Saturating at 91%  currently. Weight 220 pounds. The patient without nuchal rigidity. NEUROLOGIC: Awake and alert. Follows commands. She has good  insight, mood and affect. There is no weakness in the arms. No cranial  nerve abnormalities.  In the lower extremity I could see evidence of 1  strength knee extension both sides and knee flexion both sides. Some  movement of the toes on dorsiflexion and plantar flexion. I saw this  previously also. There is some ability to determine light touch on the  left, less so on the right. RONDA Gimenez MD    WT / TB  D:  06/09/2017   16:28  T:  06/09/2017   17:12  Job #:  [id number]

## 2017-06-09 NOTE — PROGRESS NOTES
NUTRITION    BPA/MST Referral       RECOMMENDATIONS / PLAN:     - Add nutritional supplement: Glucerna Shake once daily  - Continue RD inpatient monitoring and evaluation. NUTRITION INTERVENTIONS & DIAGNOSIS:     [x] Meals/Snacks: modified diet  [x] Medical food supplementation: initiate    Nutrition Diagnosis:   Altered nutrition related laboratory value related to history of diabetes and excessive carbohydrate intake as evidenced by hyperglycemia, blood glucose up to 246 mg/dL today. Inadequate oral intake related to decreased appetite as evidenced by variable meal intake, consuming between 10-50% of meals x last 2 days. ASSESSMENT:     6/9: Appetite decreased, fair intake of meals, usually 50% consumed. BG levels range:  mg/dL. Discussed adding a supplement to consume if she only consumes 50% or less of meals. 6/7: Good appetite, tolerating diet and consuming most of meals.      Average po intake adequate to meet patients estimated nutritional needs:   [] Yes     [x] No   [] Unable to determine at this time    Diet: DIET DIABETIC CONSISTENT CARB Regular      Food Allergies: blueberry, shellfish   Current Appetite:   [x] Good     [x] Fair     [] Poor     [] Other:  Appetite/meal intake prior to admission:   [x] Good     [] Fair     [] Poor     [] Other:  Feeding Limitations:  [] Swallowing difficulty    [] Chewing difficulty    [] Other:  Current Meal Intake: Patient Vitals for the past 100 hrs:   % Diet Eaten   06/09/17 1328 50 %   06/09/17 1020 50 %   06/08/17 1856 20 %   06/08/17 1406 50 %   06/07/17 1810 40 %   06/07/17 1304 15 %   06/07/17 0937 40 %     BM: 6/6  Skin Integrity: skin tear to buttocks (not pressure per documentation)   Edema: 1+ generalized, 2+ LEs  Pertinent Medications: Reviewed    Recent Labs      06/09/17   0420  06/07/17   0551  06/06/17   1940   NA  136  137  135*   K  3.9  3.2*  3.2*   CL  101  100  99*   CO2  28  29  27   GLU  95  160*  228*   BUN  10  13  16   CREA 0. 76  0.71  0.93   CA  9.0  8.4*  8.2*   MG  1.8   --   1.6   ALB   --   1.9*  1.7*   SGOT   --   43*  57*   ALT   --   30  31       Intake/Output Summary (Last 24 hours) at 06/09/17 1349  Last data filed at 06/09/17 1328   Gross per 24 hour   Intake              870 ml   Output             2100 ml   Net            -1230 ml       Anthropometrics:  Ht Readings from Last 1 Encounters:   05/18/17 5' 7\" (1.702 m)     Last 3 Recorded Weights in this Encounter    06/06/17 1911 06/08/17 0350 06/09/17 0535   Weight: 113.4 kg (250 lb) 100.2 kg (220 lb 14.4 oz) 100 kg (220 lb 7.4 oz)     Body mass index is 34.53 kg/(m^2). Obese, Class II     Weight History: patient reports usual weight of 238 lb (weight gain due to fluid)     Weight Metrics 6/9/2017 5/25/2017 4/20/2017 4/6/2017 4/6/2017 3/30/2017 3/16/2017   Weight 220 lb 7.4 oz 244 lb 6.4 oz - 233 lb 227 lb 1.6 oz - 240 lb   BMI 34.53 kg/m2 - 38.28 kg/m2 36.49 kg/m2 - 35.57 kg/m2 37.59 kg/m2        Admitting Diagnosis: Anemia  Hypervolemia  Anemia  PMHx: cardiomyopathy, DM, dyslipidemia, diabetic neuropathy, CHF, GOUT    Education Needs:        [x] None identified  [] Identified - Not appropriate at this time  []  Identified and addressed - refer to education log  Learning Limitations:   [x] None identified  [] Identified    Cultural, Confucianist & ethnic food preferences:  [x] None identified    [] Identified and addressed     ESTIMATED NUTRITION NEEDS:     Calories: 5442-9168 kcal (MSJx1.2-1.3) based on  [x] Usual/Actual  kg    [] IBW   CHO: 248-269 gm (50% kcal)   Protein:  gm (0.8-1 gm/kg) based on  [x] Actual BW      [] IBW   Fluid: 1 mL/kcal     MONITORING & EVALUATION:     Nutrition Goal(s):   1. Po intake of meals will meet >75% of patient estimated nutritional needs within the next 7 days.   Outcome:  [] Met/Ongoing    [x]  Not Met    [] New/Initial Goal      Monitoring:   [x] Diet tolerance   [x] Meal intake   [x] Supplement intake   [] GI symptoms/ability to tolerate po diet   [] Respiratory status   [x] Glycemic control, blood glucose level     Previous Recommendations (for follow-up assessments only):     [x]   Implemented       []   Not Implemented (RD to address)     [] No Recommendation Made     Discharge Planning: cardiac, diabetic diet   [x] Participated in care planning, discharge planning, & interdisciplinary rounds as appropriate      Brigid Bender, [address]    Pager: [phone]

## 2017-06-09 NOTE — PROGRESS NOTES
Infectious Disease progress Note    Requested by: Dr. Concepcion Beck    Reason: sepsis    Current abx Prior abx   Ceftriaxone 4/24-5/15; 5/22- till date Fluconazole 5/16-5/30  Meropenem 5/15-5/22  Cefepime 4/20-4/21     Lines:       Assessment :      77 y.o., right handed female, with an established history of hypertension, complete heart block with permanent pacemaker placed, diabetes mellitus, diabetic peripheral neuropathy, obesity, admitted to [facility] on 6/6/2017.      Recent hospitalization (4/20/17-5/15/17) for Sepsis  secondary to group B streptococcus bloodstream infection (positive blood cultures 4/20, negative blood cultures 4/21). Most likely source of bloodstream infection is infected right psoas hematoma/abscess. 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       Enterococcus in urine cultures 4/20 likely colonizer    bilateral LE dvt - s/p IVC filter placement 5/11    Now with low grade fevers, abdominal pain, acute anemia. Highly complex clinical picture. Difficult to determine etiology of patients' symptoms. Differential diagnosis: undiagnosed GI bleed/anemia of chronic disease from spinal osteomyelitis     Abdominal pain and pain radiating down to right leg is likely radiculopathy/neuropathy - significant clinical improvement after initiation of neurontin    Constipation as seen on ct scan may be contributing to abdominal pain. 6x1 cm residual collection right psoas is likely serous fluid - most recent drains only drained serous material. Lack of fevers/increasing wbc/worsening right flank tenderness argues against worsening infection. Hence, will monitor clinically      patient doesn't have erythema at the site of pacemaker.  No fluid collection noted at pacemaker pocket site per CT scan 6/7/17 - discussed with radiologist. At this time risk of removal of pacemaker/general pocket change exceed the benefit.        No evidence of pneumonia. No significant pyuria.      No leukocytosis        Recommendations:     1. continue ceftriaxone  2. Obtain bone scan to evaluate for chronic thoracolumbar spine osteomyelitis as the cause of anemia  3. F/u blood cultures  4. Recommend EGD if etiology of anemia remains unidentified and positive stool occult blood.      Above plan was discussed in details with patient. Please call me if any further questions or concerns. Will continue to participate in the care of this patient. subjective:    Feels better. Improved abdominal pain/right leg pain/flank pain. No fever, chills, diarrhea. Had one firm BM yesterday. Patient denies headaches, visual disturbances, sore throat, runny nose, earaches, cp, sob, chills, cough, diarrhea, burning micturition, increased pain or weakness in extremities. Home Medication List    Details   oxyCODONE-acetaminophen (PERCOCET 7.5) 7.5-325 mg per tablet Take 1 Tab by mouth every six (6) hours as needed (for pain level greater than 5/10). Max Daily Amount: 4 Tabs. Indications: Pain  Qty: 20 Tab, Refills: 0      albuterol-ipratropium (DUO-NEB) 2.5 mg-0.5 mg/3 ml nebu 3 mL by Nebulization route four (4) times daily. Qty: 30 Nebule, Refills: 0      allopurinol (ZYLOPRIM) 100 mg tablet Take 0.5 Tabs by mouth daily. Indications: HYPERURICEMIA  Qty: 30 Tab, Refills: 0      cefTRIAXone 2 gram 2 g IVPB 2 g by IntraVENous route every twenty-four (24) hours for 20 days. Qty: 20 Dose, Refills: 0      midodrine (PROAMITINE) 2.5 mg tablet 5 mg [2 tablets] po three time a day for 5 days then 2.5 mg po three time a day x 5 days then stop  Qty: 45 Tab, Refills: 0      Lactobacillus Acidoph & Bulgar (FLORANEX) 1 million cell tab tablet Take 1 Tab by mouth two (2) times a day for 21 days.   Qty: 42 Tab, Refills: 0      aluminum-magnesium hydroxide (MAALOX) 200-200 mg/5 mL suspension Take 15 mL by mouth four (4) times daily as needed for Indigestion. Qty: 100 mL, Refills: 0      folic acid (FOLVITE) 1 mg tablet Take 1 Tab by mouth daily. Qty: 30 Tab, Refills: 0      insulin glargine (LANTUS) 100 unit/mL injection 5 Units by SubCUTAneous route nightly. Indications: type 2 diabetes mellitus  Qty: 1 Vial, Refills: 0    Associated Diagnoses: Type 2 diabetes mellitus with diabetic neuropathy, with long-term current use of insulin (HCC)      ferrous sulfate 325 mg (65 mg iron) tablet Take 1 Tab by mouth two (2) times daily (with meals) for 14 days. Qty: 28 Tab, Refills: 0      famotidine (PEPCID) 20 mg tablet Take 1 Tab by mouth nightly. Qty: 30 Tab, Refills: 0      polyethylene glycol (MIRALAX) 17 gram packet Take 1 Packet by mouth two (2) times a day. Qty: 30 Packet, Refills: 0      cholecalciferol (VITAMIN D3) 1,000 unit tablet Take 2 Tabs by mouth daily. Indications: PREVENTION OF VITAMIN D DEFICIENCY  Qty: 30 Tab, Refills: 0      ondansetron (ZOFRAN ODT) 4 mg disintegrating tablet Take 4 mg by mouth every eight (8) hours as needed for Nausea. acetaminophen (TYLENOL) 325 mg tablet Take 2 Tabs by mouth every four (4) hours as needed (for fever or pain level less than 5/10). Indications: Fever, Pain  Qty: 30 Tab, Refills: 0    Associated Diagnoses: Iliopsoas muscle hematoma, right, subsequent encounter      senna-docusate (PERICOLACE) 8.6-50 mg per tablet Take 2 Tabs by mouth daily (after dinner). Indications: Constipation  Qty: 30 Tab, Refills: 0      pravastatin (PRAVACHOL) 40 mg tablet Take 40 mg by mouth nightly.  Indications: DYSLIPIDEMIA      insulin aspart (NOVOLOG) 100 unit/mL injection INITIATE INSULIN CORRECTIVE PROTOCOL (HR): Normal Insulin Sensitivity  For Blood Sugar (mg/dL) of:    Less than 150 =   0 units          150 -199 =   2 units 200 -249 =   4 units 250 -299 =   6 units 300 -349 =   8 units 350 and above =   10 units If 2 glucose readings are above 200 mg/dL  Qty: 10 mL, Refills: 6             Current Facility-Administered Medications   Medication Dose Route Frequency    gabapentin (NEURONTIN) capsule 600 mg  600 mg Oral BID    potassium chloride (K-DUR, KLOR-CON) SR tablet 20 mEq  20 mEq Oral BID    ondansetron (ZOFRAN ODT) tablet 4 mg  4 mg Oral Q8H PRN    acetaminophen (TYLENOL) tablet 650 mg  650 mg Oral Q4H PRN    senna-docusate (PERICOLACE) 8.6-50 mg per tablet 2 Tab  2 Tab Oral PCD    cholecalciferol (VITAMIN D3) tablet 2,000 Units  2,000 Units Oral DAILY    oxyCODONE-acetaminophen (PERCOCET 7.5) 7.5-325 mg per tablet 1 Tab  1 Tab Oral Q6H PRN    allopurinol (ZYLOPRIM) tablet 50 mg  50 mg Oral DAILY    midodrine (PROAMITINE) tablet 2.5 mg  2.5 mg Oral TID WITH MEALS    Lactobacillus Acidoph & Bulgar (FLORANEX) tablet 1 Tab  1 Tab Oral BID    folic acid (FOLVITE) tablet 1 mg  1 mg Oral DAILY    insulin glargine (LANTUS) injection 5 Units  5 Units SubCUTAneous QHS    ferrous sulfate tablet 325 mg  325 mg Oral BID WITH MEALS    famotidine (PEPCID) tablet 20 mg  20 mg Oral QHS    polyethylene glycol (MIRALAX) packet 17 g  17 g Oral BID    cefTRIAXone (ROCEPHIN) 2 g in 0.9% sodium chloride (MBP/ADV) 50 mL MBP  2 g IntraVENous Q24H    aluminum-magnesium hydroxide (MAALOX) oral suspension 15 mL  15 mL Oral QID PRN    sodium chloride (NS) flush 5-10 mL  5-10 mL IntraVENous Q8H    sodium chloride (NS) flush 5-10 mL  5-10 mL IntraVENous PRN    albuterol-ipratropium (DUO-NEB) 2.5 MG-0.5 MG/3 ML  3 mL Nebulization Q4H PRN    magnesium oxide (MAG-OX) tablet 400 mg  400 mg Oral BID    furosemide (LASIX) tablet 40 mg  40 mg Oral DAILY    diphenhydrAMINE (BENADRYL) capsule 25 mg  25 mg Oral BID    insulin lispro (HUMALOG) injection   SubCUTAneous AC&HS    glucose chewable tablet 16 g  16 g Oral PRN    glucagon (GLUCAGEN) injection 1 mg  1 mg IntraMUSCular PRN    dextrose (D50W) injection syrg 12.5-25 g  25-50 mL IntraVENous PRN    0.9% sodium chloride infusion 250 mL  250 mL IntraVENous PRN Allergies: Vancomycin; Ampicillin; Bactrim [sulfamethoxazole-trimethoprim]; Blueberry; Ciprofloxacin; Codeine; Crestor [rosuvastatin]; Darvocet a500 [propoxyphene n-acetaminophen]; Demerol [meperidine]; Levaquin [levofloxacin]; Lipitor [atorvastatin]; Magnesium oxide; Minocin [minocycline]; Pcn [penicillins]; Pravachol [pravastatin]; Shellfish derived; Sulfa (sulfonamide antibiotics); Ultracet [tramadol-acetaminophen]; Vicodin [hydrocodone-acetaminophen]; Vytorin 10-10 [ezetimibe-simvastatin]; and Percodan [oxycodone hcl-oxycodone-asa]    ROS: 12 point ROS obtained in details. Pertinent positives as mentioned in HPI,   otherwise negative    Physical Exam:    General: Well developed, well nourished female laying on the bed, AAOx3 NAD     General:  awake alert and oriented   HEENT:  Normocephalic, atraumatic, PERRL, EOMI, no scleral icterus or pallor; no conjunctival hemmohage; nasal and oral mucous are moist and without evidence of lesions. Neck supple, no bruits. Lymph Nodes:  no cervical, axillary or inguinal adenopathy   Lungs:  non-labored, bilaterally clear to auscultation- no crackles wheezes rales or rhonchi   Heart:  s1 and s2 irregular; no rubs or gallops, no edema, + pedal pulses   Abdomen: soft, non-distended, active bowel sounds, no hepatomegaly, no splenomegaly. no tenderness over the left abdominal pacemaker, no overlying erythema or fluctuance, decreased epigastric/RUQ tenderness   Genitourinary: Krueger in place   Extremities:  no clubbing, cyanosis; no joint effusions or swelling; muscle mass appropriate for age   Neurologic:  No gross focal sensory abnormalities; 5/5 muscle strength to upper extremities. 0/5 strength in lower extremities.  Cranial nerves intact   Skin:  Surgical scars abdomen, left neck well healed   Back: no paraspinal muscle guarding or rigidity, resolved right CVA tenderness, tenderness over lumbar spine around L4-L5   Psychiatric:  No suicidal or homicidal ideations, appropriate mood and affect          Labs: Results:   Chemistry Recent Labs      06/09/17   0420  06/07/17   0551  06/06/17   1940   GLU  95  160*  228*   NA  136  137  135*   K  3.9  3.2*  3.2*   CL  101  100  99*   CO2  28  29  27   BUN  10  13  16   CREA  0.76  0.71  0.93   CA  9.0  8.4*  8.2*   AGAP  7  8  9   BUCR  13  18  17   AP   --   199*  206*   TP   --   6.1*  6.3*   ALB   --   1.9*  1.7*   GLOB   --   4.2*  4.6*   AGRAT   --   0.5*  0.4*      CBC w/Diff Recent Labs      06/09/17   0420  06/08/17   0945  06/07/17   0551   WBC  7.0  7.1  7.2   RBC  4.06*  3.41*  3.26*   HGB  10.9*  9.3*  8.8*   HCT  33.2*  27.4*  25.8*   PLT  251  284  249   GRANS  56  61  61   LYMPH  29  27  26   EOS  3  2  2      Microbiology Recent Labs      06/06/17 2130  06/06/17 2055   CULT  NO GROWTH 3 DAYS  NO GROWTH 3 DAYS          RADIOLOGY:    All available imaging studies/reports in [facility] for this admission were reviewed    Dr. Coley, Infectious Disease Specialist  [phone]  June 9, 2017  8:43 AM

## 2017-06-09 NOTE — PROGRESS NOTES
Per MD order, perez catheter changed. Patient tolerated well, no complaints of pain. Draining clear yellow urine without odor. Hanging with green clip to sheet, no loops, red seal intact, orange tag with date and time of insertion placed on back of drainage bag. Will continue to monitor.

## 2017-06-10 LAB
GLUCOSE BLD STRIP.AUTO-MCNC: 136 MG/DL (ref 70–110)
GLUCOSE BLD STRIP.AUTO-MCNC: 172 MG/DL (ref 70–110)
GLUCOSE BLD STRIP.AUTO-MCNC: 172 MG/DL (ref 70–110)
GLUCOSE BLD STRIP.AUTO-MCNC: 206 MG/DL (ref 70–110)

## 2017-06-10 PROCEDURE — 74011636637 HC RX REV CODE- 636/637: Performed by: FAMILY MEDICINE

## 2017-06-10 PROCEDURE — 74011250636 HC RX REV CODE- 250/636: Performed by: FAMILY MEDICINE

## 2017-06-10 PROCEDURE — 74011250637 HC RX REV CODE- 250/637: Performed by: FAMILY MEDICINE

## 2017-06-10 PROCEDURE — 65660000000 HC RM CCU STEPDOWN

## 2017-06-10 PROCEDURE — 74011000258 HC RX REV CODE- 258: Performed by: FAMILY MEDICINE

## 2017-06-10 PROCEDURE — 74011250637 HC RX REV CODE- 250/637: Performed by: INTERNAL MEDICINE

## 2017-06-10 PROCEDURE — 82962 GLUCOSE BLOOD TEST: CPT

## 2017-06-10 RX ADMIN — Medication 400 MG: at 17:19

## 2017-06-10 RX ADMIN — FERROUS SULFATE TAB 325 MG (65 MG ELEMENTAL FE) 325 MG: 325 (65 FE) TAB at 17:19

## 2017-06-10 RX ADMIN — ACETAMINOPHEN 650 MG: 325 TABLET ORAL at 21:29

## 2017-06-10 RX ADMIN — Medication 400 MG: at 09:50

## 2017-06-10 RX ADMIN — POTASSIUM CHLORIDE 20 MEQ: 20 TABLET, EXTENDED RELEASE ORAL at 09:49

## 2017-06-10 RX ADMIN — MIDODRINE HYDROCHLORIDE 2.5 MG: 2.5 TABLET ORAL at 12:35

## 2017-06-10 RX ADMIN — Medication 10 ML: at 21:30

## 2017-06-10 RX ADMIN — INSULIN LISPRO 4 UNITS: 100 INJECTION, SOLUTION INTRAVENOUS; SUBCUTANEOUS at 12:35

## 2017-06-10 RX ADMIN — DIPHENHYDRAMINE HYDROCHLORIDE 25 MG: 25 CAPSULE ORAL at 17:19

## 2017-06-10 RX ADMIN — POTASSIUM CHLORIDE 20 MEQ: 20 TABLET, EXTENDED RELEASE ORAL at 17:19

## 2017-06-10 RX ADMIN — MIDODRINE HYDROCHLORIDE 2.5 MG: 2.5 TABLET ORAL at 09:48

## 2017-06-10 RX ADMIN — LACTOBACILLUS TAB 1 TABLET: TAB at 17:19

## 2017-06-10 RX ADMIN — POLYETHYLENE GLYCOL 3350 17 G: 17 POWDER, FOR SOLUTION ORAL at 09:47

## 2017-06-10 RX ADMIN — Medication 10 ML: at 17:26

## 2017-06-10 RX ADMIN — INSULIN LISPRO 2 UNITS: 100 INJECTION, SOLUTION INTRAVENOUS; SUBCUTANEOUS at 22:58

## 2017-06-10 RX ADMIN — GABAPENTIN 600 MG: 300 CAPSULE ORAL at 09:49

## 2017-06-10 RX ADMIN — FAMOTIDINE 20 MG: 20 TABLET ORAL at 21:29

## 2017-06-10 RX ADMIN — VITAMIN D, TAB 1000IU (100/BT) 2000 UNITS: 25 TAB at 09:49

## 2017-06-10 RX ADMIN — INSULIN LISPRO 2 UNITS: 100 INJECTION, SOLUTION INTRAVENOUS; SUBCUTANEOUS at 17:21

## 2017-06-10 RX ADMIN — Medication 10 ML: at 10:00

## 2017-06-10 RX ADMIN — ALLOPURINOL 50 MG: 100 TABLET ORAL at 09:49

## 2017-06-10 RX ADMIN — FERROUS SULFATE TAB 325 MG (65 MG ELEMENTAL FE) 325 MG: 325 (65 FE) TAB at 09:49

## 2017-06-10 RX ADMIN — DIPHENHYDRAMINE HYDROCHLORIDE 25 MG: 25 CAPSULE ORAL at 09:49

## 2017-06-10 RX ADMIN — FUROSEMIDE 40 MG: 10 INJECTION, SOLUTION INTRAMUSCULAR; INTRAVENOUS at 09:49

## 2017-06-10 RX ADMIN — GABAPENTIN 600 MG: 300 CAPSULE ORAL at 17:19

## 2017-06-10 RX ADMIN — CEFTRIAXONE 2 G: 2 INJECTION, POWDER, FOR SOLUTION INTRAMUSCULAR; INTRAVENOUS at 00:00

## 2017-06-10 RX ADMIN — INSULIN GLARGINE 5 UNITS: 100 INJECTION, SOLUTION SUBCUTANEOUS at 22:56

## 2017-06-10 RX ADMIN — FOLIC ACID 1 MG: 1 TABLET ORAL at 09:49

## 2017-06-10 RX ADMIN — STANDARDIZED SENNA CONCENTRATE AND DOCUSATE SODIUM 2 TABLET: 8.6; 5 TABLET, FILM COATED ORAL at 17:19

## 2017-06-10 NOTE — PROGRESS NOTES
Re:  Armand Gonzalez,Follow up visit     6/10/2017 3:09 PM    SSN: [id number]    Subjective:   Ubaldo Manning is seen in follow up. 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      Medications:    Current Facility-Administered Medications   Medication Dose Route Frequency Provider Last Rate Last Dose    furosemide (LASIX) injection 40 mg  40 mg IntraVENous DAILY Beba Torrez MD   40 mg at 06/10/17 0949    gabapentin (NEURONTIN) capsule 600 mg  600 mg Oral BID Sue Juan MD   600 mg at 06/10/17 0949    potassium chloride (K-DUR, KLOR-CON) SR tablet 20 mEq  20 mEq Oral BID Beba Torrez MD   20 mEq at 06/10/17 0949    ondansetron (ZOFRAN ODT) tablet 4 mg  4 mg Oral Q8H PRN Beba Torrez MD        acetaminophen (TYLENOL) tablet 650 mg  650 mg Oral Q4H PRN Beba Torrez MD   650 mg at 06/08/17 2129    senna-docusate (Sri Rhodes) 8.6-50 mg per tablet 2 Tab  2 Tab Oral PCD Beba Torrez MD   2 Tab at 06/09/17 1743    cholecalciferol (VITAMIN D3) tablet 2,000 Units  2,000 Units Oral DAILY Beba Torrez MD   2,000 Units at 06/10/17 0949    oxyCODONE-acetaminophen (PERCOCET 7.5) 7.5-325 mg per tablet 1 Tab  1 Tab Oral Q6H PRN Beba Torrez MD   1 Tab at 06/08/17 1547    allopurinol (ZYLOPRIM) tablet 50 mg  50 mg Oral DAILY Beba Torrez MD   50 mg at 06/10/17 0949    midodrine (PROAMITINE) tablet 2.5 mg  2.5 mg Oral TID WITH MEALS Beba Torrez MD   2.5 mg at 06/10/17 1235    Lactobacillus Acidoph & Hollandgar [facility]) tablet 1 Tab  1 Tab Oral BID Beba Torrez MD   1 Tab at 74/77/00 4322    folic acid (FOLVITE) tablet 1 mg  1 mg Oral DAILY Beba Torrez MD   1 mg at 06/10/17 0949    insulin glargine (LANTUS) injection 5 Units  5 Units SubCUTAneous QHS Beba Torrez MD   5 Units at 06/09/17 2209    ferrous sulfate tablet 325 mg  325 mg Oral BID WITH MEALS eBba Torrez MD   325 mg at 06/10/17 0949    famotidine (PEPCID) tablet 20 mg  20 mg Oral [facility] Beba Torrez MD   20 mg at 06/09/17 2210  polyethylene glycol (MIRALAX) packet 17 g  17 g Oral BID Sandeep Silver MD   17 g at 06/10/17 0947    cefTRIAXone (ROCEPHIN) 2 g in 0.9% sodium chloride (MBP/ADV) 50 mL MBP  2 g IntraVENous Q24H Sandeep Silver  mL/hr at 06/10/17 0000 2 g at 06/10/17 0000    aluminum-magnesium hydroxide (MAALOX) oral suspension 15 mL  15 mL Oral QID PRN Sandeep Silver MD        sodium chloride (NS) flush 5-10 mL  5-10 mL IntraVENous Azeem Quigley MD   10 mL at 06/10/17 1000    sodium chloride (NS) flush 5-10 mL  5-10 mL IntraVENous PRN Sandeep Silver MD        albuterol-ipratropium (DUO-NEB) 2.5 MG-0.5 MG/3 ML  3 mL Nebulization Q4H PRN Sadneep Silver MD        magnesium oxide (MAG-OX) tablet 400 mg  400 mg Oral BID Sandeep Silver MD   400 mg at 06/10/17 0950    diphenhydrAMINE (BENADRYL) capsule 25 mg  25 mg Oral BID Sandeep Silver MD   25 mg at 06/10/17 0949    insulin lispro (HUMALOG) injection   SubCUTAneous AC&HS Sandeep Silver MD   4 Units at 06/10/17 1235    glucose chewable tablet 16 g  16 g Oral PRN Sandeep Silver MD        glucagon (GLUCAGEN) injection 1 mg  1 mg IntraMUSCular PRN Sandeep Silver MD        dextrose (D50W) injection syrg 12.5-25 g  25-50 mL IntraVENous PRN Sandeep Silver MD        0.9% sodium chloride infusion 250 mL  250 mL IntraVENous PRN Frank Bhandari MD           Vital signs:    Visit Vitals    /71 (BP 1 Location: Right arm, BP Patient Position: At rest)    Pulse 85    Temp 98.2 °F (36.8 °C)    Resp 17    Wt 99.8 kg (220 lb)  Comment: bed scale inoperable    SpO2 98%    Breastfeeding No    BMI 34.46 kg/m2       Review of Systems:   As above otherwise 11 point review of systems negative including;   Constitutional no fever or chills  Skin denies rash or itching  HEENT  Denies tinnitus, hearing lose  Eyes denies diplopia vision lose  Respiratory denies sortness of breath  Cardiovascular denies chest pain, dyspnea on exertion  Gastrointestinal denies nausea, vomiting, diarrhea, constipation  Musculoskeletal denies joint pain or swelling  Endocrine denies weight change  Hematology denies easy bruising or bleeding   Neurological as above in HPI      Patient Active Problem List   Diagnosis Code    Nonischemic cardiomyopathy ([facility] I42.8    History of complete heart block Z86.79    Biventricular implantable cardioverter-defibrillator in situ Z95.810    Left bundle branch block (LBBB) on electrocardiogram I44.7    Type 2 diabetes mellitus with diabetic neuropathy ([facility]) E11.40    Dyslipidemia E78.5    Diabetic neuropathy associated with type 2 diabetes mellitus ([facility] E11.40    Obstructive sleep apnea on CPAP G47.33, Z99.89    AICD generator infection ([facility] T82. 7XXA    Difficult airway for intubation T88. 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 1XXA    Impaired mobility and ADLs Z74.09    History of Coumadin therapy Z79.01    Gout M10.9    Acute paraplegia ([facility]) G82.20    Sepsis ([facility] A41.9    Psoas abscess, right ([facility] K68.12    Krueger catheter in place on admission Z96.0    Urinary tract infection due to Enterococcus N39.0, B95.2    Group B streptococcal infection A49.1    Hypervolemia E87.70    Anemia D64.9         Objective: The patient is awake, alert, and oriented x 4. Fund of knowledge is adequate. Speech is fluent and memory is intact. Cranial Nerves: II  Visual fields are full to confrontation.   III, IV, VI  Extraocular movements are intact. There is no nystagmus. V  Facial sensation is intact to pinprick. VII  Face is symmetrical.  VIII - Hearing is present. IX, X, XII  Palate is symmetrical.   XI - Shoulder shrugging and head turning intact  Motor: The patient moves the upper limbs fairly well and symmetrically. The legs are nearly plegic with a little toe movement and some hip flexion, no better than 2/5 in those region. No other movement identified. Tone is normal. Reflexes are 2+ and symmetrical. Plantars are mute bilaterally. Gait is not testable. CBC:   Lab Results   Component Value Date/Time    WBC 7.0 06/09/2017 04:20 AM    RBC 4.06 06/09/2017 04:20 AM    HGB 10.9 06/09/2017 04:20 AM    HCT 33.2 06/09/2017 04:20 AM    PLATELET 040 78/69/5739 04:20 AM     BMP:   Lab Results   Component Value Date/Time    Glucose 95 06/09/2017 04:20 AM    Sodium 136 06/09/2017 04:20 AM    Potassium 3.9 06/09/2017 04:20 AM    Chloride 101 06/09/2017 04:20 AM    CO2 28 06/09/2017 04:20 AM    BUN 10 06/09/2017 04:20 AM    Creatinine 0.76 06/09/2017 04:20 AM    Calcium 9.0 06/09/2017 04:20 AM     CMP:   Lab Results   Component Value Date/Time    Glucose 95 06/09/2017 04:20 AM    Sodium 136 06/09/2017 04:20 AM    Potassium 3.9 06/09/2017 04:20 AM    Chloride 101 06/09/2017 04:20 AM    CO2 28 06/09/2017 04:20 AM    BUN 10 06/09/2017 04:20 AM    Creatinine 0.76 06/09/2017 04:20 AM    Calcium 9.0 06/09/2017 04:20 AM    Anion gap 7 06/09/2017 04:20 AM    BUN/Creatinine ratio 13 06/09/2017 04:20 AM    Alk.  phosphatase 199 06/07/2017 05:51 AM    Protein, total 6.1 06/07/2017 05:51 AM    Albumin 1.9 06/07/2017 05:51 AM    Globulin 4.2 06/07/2017 05:51 AM    A-G Ratio 0.5 06/07/2017 05:51 AM     Coagulation:   Lab Results   Component Value Date/Time    Prothrombin time 15.1 06/06/2017 07:40 PM    INR 1.2 06/06/2017 07:40 PM    aPTT 42.6 06/06/2017 07:40 PM     6/9/2017  6:08 PM - José, Lab In [facility]   Component Results   Component Value Flag Ref Range Units Status   Lipase 136  73 - 393 U/L Final     6/9/2017  6:08 PM - José, Lab In [facility]   Component Results   Component Value Flag Ref Range Units Status   Amylase 44  25 - 115 U/L Final         Assessment:  Patient with fever and a history of spinal cord infarction secondary to psoas abscess. Plan:  Not much going on neurologically and will follow intermittently. Sincerely,        Antonio Moritz.  Jonel Mc M.D.

## 2017-06-10 NOTE — ROUTINE PROCESS
Bedside and Verbal shift change report given to Maude (oncoming nurse) by Rashmi Rojas (offgoing nurse). Report included the following information SBAR, Kardex, MAR and Recent Results. SITUATION:    Code Status: Full Code   Reason for Admission: Anemia   Hypervolemia   Anemia    [facility] day: 3   Problem List:       Hospital Problems  Date Reviewed: 5/24/2017          Codes Class Noted POA    Hypervolemia ICD-10-CM: E87.70  ICD-9-CM: 276.69  6/6/2017 Unknown        Anemia ICD-10-CM: D64.9  ICD-9-CM: 285.9  6/6/2017 Unknown              BACKGROUND:    Past Medical History:   Past Medical History:   Diagnosis Date    Acute paraplegia ([facility] 4/20/2017    Benign hypertensive heart disease with systolic CHF, NYHA class 2 ([facility] 9/5/2012    Biventricular implantable cardioverter-defibrillator in situ 04/28/2005    Upgraded to BiV AICD; gen change 4/2008; pocket revision 10/2009; Abdominal - done on 8/22/2012 by Dr. Gardner Cava Cardiac cath 08/15/1996    Patent coronaries. Elev LVEDP. EF 50-55%.  Cardiac echocardiogram 06/23/2015    Ltd study. EF 45-50%. Mild, diffuse hypk. Severe apical hypk. No mass or thrombus was clearly identified, although imaging was suboptimal.      Cardiac nuclear imaging test 06/19/2015    Fixed distal apical, distal septal defect more likely due to RV pacing than prior infarct. No ischemia. EF 46%. RWMA c/w RV pacing. Nondiagnostic EKG on pharm stress test.      Cardiovascular lower extremity venous duplex 09/04/2012    Acute, non-occlusive DVT in CFV on right. No DVT on left. No superficial thrombosis bilaterally.  Cardiovascular upper extremity venous duplex 08/27/2012    DVT in axillary vein on left. Left subclavian was not visualized.     Chronic anemia 9/5/2012    Chronic systolic heart failure (HCC)     Decreased calculated glomerular filtration rate (GFR) 3/30/2017    Calculated GFR equivalent to that of CKD stage 3 = 30-59 ml/min    Diabetic neuropathy associated with type 2 diabetes mellitus ([facility] 6/28/2011    Difficult airway for intubation 08/22/2012    see anesthesia airway note    Dyslipidemia 6/28/2011    Gout     History of complete heart block 6/28/2011    History of Coumadin therapy     Anticoagulation for DVT of the LUE; Discontinued on 3/30/2017    History of deep venous thrombosis 9/5/2012    Left upper extremity    History of pyelonephritis 3/30/2017    Left bundle branch block (LBBB) on electrocardiogram 6/28/2011    Nonischemic cardiomyopathy ([address] 6/28/2011    Obesity (BMI 35.0-39.9 without comorbidity) ([address] 3/13/2017    Obstructive sleep apnea on CPAP 2/7/2012    Psoas abscess, right ([address] 4/20/2017    Psoas hematoma, right, secondary to anticoagulant therapy 3/30/2017    Type 2 diabetes mellitus with diabetic neuropathy ([address] 6/28/2011         Patient taking anticoagulants yes     ASSESSMENT:    Changes in Assessment Throughout Shift: Stable     Patient has Central Line: no Reasons if yes:    Patient has Krueger Cath: yes Reasons if yes: obstruction/retention      Last Vitals:     Vitals:    06/09/17 0812 06/09/17 1238 06/09/17 1632 06/09/17 2010   BP: 150/77 147/73 165/84 136/81   Pulse: 88 81 96 91   Resp: 18 22 20 20   Temp: 99.1 °F (37.3 °C) 97.9 °F (36.6 °C) 99.1 °F (37.3 °C) 100 °F (37.8 °C)   SpO2: 95% 91% 96%    Weight:            IV and DRAINS (will only show if present)   [REMOVED] Peripheral IV 06/06/17 Right Wrist-Site Assessment: Clean, dry, & intact  [REMOVED] Peripheral IV 06/06/17 Left Wrist-Site Assessment: Clean, dry, & intact  Peripheral IV 06/08/17 Left Forearm-Site Assessment: Clean, dry, & intact     WOUND (if present)   Wound Type:  Diabetic ulcers   Dressing present Dressing Present : Yes   Wound Concerns/Notes:  none     PAIN    Pain Assessment    Pain Intensity 1: 0 (06/09/17 1615)    Pain Location 1: Leg    Pain Intervention(s) 1: Medication (see MAR)    Patient Stated Pain Goal: 0  o Interventions for Pain:  none  o Intervention effective:   o Time of last intervention:   o Reassessment Completed: yes      Last 3 Weights:  Last 3 Recorded Weights in this Encounter    06/06/17 1911 06/08/17 0350 06/09/17 0535   Weight: 113.4 kg (250 lb) 100.2 kg (220 lb 14.4 oz) 100 kg (220 lb 7.4 oz)     Weight change: -0.2 kg (-7 oz)     INTAKE/OUPUT    Current Shift:      Last three shifts: 06/08 0701 - 06/09 1900  In: 1110 [P.O.:960; I.V.:150]  Out: 3450 [Urine:3450]     LAB RESULTS     Recent Labs      06/09/17   0420  06/08/17   0945  06/07/17   0551   WBC  7.0  7.1  7.2   HGB  10.9*  9.3*  8.8*   HCT  33.2*  27.4*  25.8*   PLT  251  284  249        Recent Labs      06/09/17   0420  06/07/17   0551   NA  136  137   K  3.9  3.2*   GLU  95  160*   BUN  10  13   CREA  0.76  0.71   CA  9.0  8.4*   MG  1.8   --        RECOMMENDATIONS AND DISCHARGE PLANNING     1. Pending tests/procedures/ Plan of Care or Other Needs: none     2. Discharge plan for patient and Needs/Barriers: no    3. Estimated Discharge Date: 06/13/17 Posted on Whiteboard in [facility] Room: yes      4. The patient's care plan was reviewed with the oncoming nurse. \"HEALS\" SAFETY CHECK      Fall Risk    Total Score: 3    Safety Measures: Safety Measures: Bed/Chair-Wheels locked, Bed in low position, Caregiver at bedside, Side rails X 3, Restraints    A safety check occurred in the patient's room between off going nurse and oncoming nurse listed above.     The safety check included the below items  Area Items   H  High Alert Medications - Verify all high alert medication drips (heparin, PCA, etc.)   E  Equipment - Suction is set up for ALL patients (with yanker)  - Red plugs utilized for all equipment (IV pumps, etc.)  - WOWs wiped down at end of shift.  - Room stocked with oxygen, suction, and other unit-specific supplies   A  Alarms - Bed alarm is set for fall risk patients  - Ensure chair alarm is in place and activated if patient is up in a chair   L  Lines - Check IV for any infiltration  - Krueger bag is empty if patient has a Krueger   - Tubing and IV bags are labeled   S  Safety   - Room is clean, patient is clean, and equipment is clean. - Hallways are clear from equipment besides carts. - Fall bracelet on for fall risk patients  - Ensure room is clear and free of clutter  - Suction is set up for ALL patients (with yanker)  - Hallways are clear from equipment besides carts.    - Isolation precautions followed, supplies available outside room, sign posted     Marta Elbing

## 2017-06-10 NOTE — PROGRESS NOTES
Yakelin Orlando M.D. PROGRESS NOTE    Name: Nito Ashley MRN: [id number]   : 1950 Hospital: [address] [place] [address]   Date: 6/10/2017  Admission Date: 2017     Subjective/Objective/Plans  2. Left upper quadrant pain, etiology unclear. 3. Ischemic cardiomyopathy with pacemaker defibrillator placement. 4. Paraplegia with neuropathic pain. 5. History of deep venous thrombosis right lower extremity. 6. Hypokalemia  7. Hypomagnesemia  8. Chronic diastolic CHF    Good urine output from Lasix  VSS  K+ better    Plan  Reduce KDUR once a day  Vital Signs:  Visit Vitals    /71 (BP 1 Location: Right arm, BP Patient Position: At rest)    Pulse 85    Temp 98.2 °F (36.8 °C)    Resp 17    Wt 99.8 kg (220 lb)  Comment: bed scale inoperable    SpO2 98%    Breastfeeding No    BMI 34.46 kg/m2       O2 Device: Room air       Temp (24hrs), Av °F (37.2 °C), Min:98.2 °F (36.8 °C), Max:100 °F (37.8 °C)     3:09 PM  Intake/Output:   Last shift:      06/10 07 - 06/10 190  In: 360 [P.O.:360]  Out: 1600 [Urine:1600]  Last 3 shifts:  190 - 06/10 0700  In: 750 [P.O.:600; I.V.:150]  Out: 2500 [Urine:2500]    Intake/Output Summary (Last 24 hours) at 06/10/17 1509  Last data filed at 06/10/17 1249   Gross per 24 hour   Intake              600 ml   Output             3200 ml   Net            -2600 ml         Physical Exam:    General: in no apparent distress, well developed and well nourished, in no respiratory distress and acyanotic and alert    HEENT: pupils equal, no ear discharge   Neck: No abnormally enlarged lymph nodes. , no JDV   Mouth: MMM no lesions    Chest: normal, no breast masses   Lungs: decreased air exchange bilaterally   Heart: Regular rate and rhythm   Abdomen: abdomen is soft without significant tenderness, masses, organomegaly or guarding   Extremity: paraplegia   Neuro: alert    Skin: Skin color, texture, turgor normal. No rashes or lesions    Data Review:    Labs: Results: Chemistry Recent Labs      06/09/17   0420   GLU  95   NA  136   K  3.9   CL  101   CO2  28   BUN  10   CREA  0.76   CA  9.0   AGAP  7   BUCR  13      CBC w/Diff Recent Labs      06/09/17   0420  06/08/17   0945   WBC  7.0  7.1   RBC  4.06*  3.41*   HGB  10.9*  9.3*   HCT  33.2*  27.4*   PLT  251  284   GRANS  56  61   LYMPH  29  27   EOS  3  2      Cardiac Enzymes No results for input(s): CPK, CKND1, CARLI in the last 72 hours. No lab exists for component: CKRMB, TROIP   Coagulation No results for input(s): PTP, INR, APTT in the last 72 hours. No lab exists for component: INREXT    Lipid Panel Lab Results   Component Value Date/Time    Cholesterol, total 154 07/24/2012 01:00 AM    HDL Cholesterol 48 07/24/2012 01:00 AM    LDL, calculated 90.4 07/24/2012 01:00 AM    VLDL, calculated 15.6 07/24/2012 01:00 AM    Triglyceride 78 07/24/2012 01:00 AM    CHOL/HDL Ratio 3.2 07/24/2012 01:00 AM      BNP No results for input(s): BNPP in the last 72 hours. Liver Enzymes No results for input(s): TP, ALB, TBILI, AP, SGOT, ALT, CBIL in the last 72 hours. Thyroid Studies Lab Results   Component Value Date/Time    TSH 0.51 04/20/2017 07:26 PM          Procedures/imaging: see electronic medical records for all procedures, Xrays and details which were not copied into this note but were reviewed. Allergies:   Allergies   Allergen Reactions    Vancomycin Itching    Ampicillin Itching    Bactrim [Sulfamethoxazole-Trimethoprim] Unknown (comments)    Blueberry Swelling     Causes throat swelling    Ciprofloxacin Itching    Codeine Other (comments)     Jumpy feeling    Crestor [Rosuvastatin] Itching    Darvocet A500 [Propoxyphene N-Acetaminophen] Itching    Demerol [Meperidine] Itching    Levaquin [Levofloxacin] Itching    Lipitor [Atorvastatin] Myalgia    Magnesium Oxide Itching     nausea    Minocin [Minocycline] Unknown (comments)    Pcn [Penicillins] Itching    Pravachol [Pravastatin] Swelling     Swelling in mouth     Shellfish Derived Unknown (comments)    Sulfa (Sulfonamide Antibiotics) Itching    Ultracet [Tramadol-Acetaminophen] Itching    Vicodin [Hydrocodone-Acetaminophen] Unknown (comments)    Vytorin 10-10 [Ezetimibe-Simvastatin] Myalgia    Percodan [Oxycodone Hcl-Oxycodone-Asa] Itching       Home Medications:  Prior to Admission Medications   Prescriptions Last Dose Informant Patient Reported? Taking? Lactobacillus Acidoph & Bulgar (FLORANEX) 1 million cell tab tablet   No No   Sig: Take 1 Tab by mouth two (2) times a day for 21 days. acetaminophen (TYLENOL) 325 mg tablet   No No   Sig: Take 2 Tabs by mouth every four (4) hours as needed (for fever or pain level less than 5/10). Indications: Fever, Pain   albuterol-ipratropium (DUO-NEB) 2.5 mg-0.5 mg/3 ml nebu   No No   Sig: 3 mL by Nebulization route four (4) times daily. allopurinol (ZYLOPRIM) 100 mg tablet   No No   Sig: Take 0.5 Tabs by mouth daily. Indications: HYPERURICEMIA   aluminum-magnesium hydroxide (MAALOX) 200-200 mg/5 mL suspension   No No   Sig: Take 15 mL by mouth four (4) times daily as needed for Indigestion. cefTRIAXone 2 gram 2 g IVPB   No No   Si g by IntraVENous route every twenty-four (24) hours for 20 days. cholecalciferol (VITAMIN D3) 1,000 unit tablet   No No   Sig: Take 2 Tabs by mouth daily. Indications: PREVENTION OF VITAMIN D DEFICIENCY   famotidine (PEPCID) 20 mg tablet   No No   Sig: Take 1 Tab by mouth nightly. ferrous sulfate 325 mg (65 mg iron) tablet   No No   Sig: Take 1 Tab by mouth two (2) times daily (with meals) for 14 days. folic acid (FOLVITE) 1 mg tablet   No No   Sig: Take 1 Tab by mouth daily.    insulin aspart (NOVOLOG) 100 unit/mL injection   No No   Sig: INITIATE INSULIN CORRECTIVE PROTOCOL (HR): Normal Insulin Sensitivity  For Blood Sugar (mg/dL) of:    Less than 150 =   0 units          150 -199 =   2 units 200 -249 =   4 units 250 -299 =   6 units 300 -349 =   8 units 350 and above = 10 units If 2 glucose readings are above 200 mg/dL   insulin glargine (LANTUS) 100 unit/mL injection   No No   Si Units by SubCUTAneous route nightly. Indications: type 2 diabetes mellitus   midodrine (PROAMITINE) 2.5 mg tablet   No No   Si mg [2 tablets] po three time a day for 5 days then 2.5 mg po three time a day x 5 days then stop   ondansetron (ZOFRAN ODT) 4 mg disintegrating tablet   Yes No   Sig: Take 4 mg by mouth every eight (8) hours as needed for Nausea. oxyCODONE-acetaminophen (PERCOCET 7.5) 7.5-325 mg per tablet   No No   Sig: Take 1 Tab by mouth every six (6) hours as needed (for pain level greater than 5/10). Max Daily Amount: 4 Tabs. Indications: Pain   polyethylene glycol (MIRALAX) 17 gram packet   No No   Sig: Take 1 Packet by mouth two (2) times a day. pravastatin (PRAVACHOL) 40 mg tablet   Yes No   Sig: Take 40 mg by mouth nightly. Indications: DYSLIPIDEMIA   senna-docusate (PERICOLACE) 8.6-50 mg per tablet   No No   Sig: Take 2 Tabs by mouth daily (after dinner).  Indications: Constipation      Facility-Administered Medications: None       Current Medications:  Current Facility-Administered Medications   Medication Dose Route Frequency    furosemide (LASIX) injection 40 mg  40 mg IntraVENous DAILY    gabapentin (NEURONTIN) capsule 600 mg  600 mg Oral BID    potassium chloride (K-DUR, KLOR-CON) SR tablet 20 mEq  20 mEq Oral BID    ondansetron (ZOFRAN ODT) tablet 4 mg  4 mg Oral Q8H PRN    acetaminophen (TYLENOL) tablet 650 mg  650 mg Oral Q4H PRN    senna-docusate (PERICOLACE) 8.6-50 mg per tablet 2 Tab  2 Tab Oral PCD    cholecalciferol (VITAMIN D3) tablet 2,000 Units  2,000 Units Oral DAILY    oxyCODONE-acetaminophen (PERCOCET 7.5) 7.5-325 mg per tablet 1 Tab  1 Tab Oral Q6H PRN    allopurinol (ZYLOPRIM) tablet 50 mg  50 mg Oral DAILY    midodrine (PROAMITINE) tablet 2.5 mg  2.5 mg Oral TID WITH MEALS    Lactobacillus Acidoph & Bulgar (FLORANEX) tablet 1 Tab  1 Tab Oral BID    folic acid (FOLVITE) tablet 1 mg  1 mg Oral DAILY    insulin glargine (LANTUS) injection 5 Units  5 Units SubCUTAneous QHS    ferrous sulfate tablet 325 mg  325 mg Oral BID WITH MEALS    famotidine (PEPCID) tablet 20 mg  20 mg Oral QHS    polyethylene glycol (MIRALAX) packet 17 g  17 g Oral BID    cefTRIAXone (ROCEPHIN) 2 g in 0.9% sodium chloride (MBP/ADV) 50 mL MBP  2 g IntraVENous Q24H    aluminum-magnesium hydroxide (MAALOX) oral suspension 15 mL  15 mL Oral QID PRN    sodium chloride (NS) flush 5-10 mL  5-10 mL IntraVENous Q8H    sodium chloride (NS) flush 5-10 mL  5-10 mL IntraVENous PRN    albuterol-ipratropium (DUO-NEB) 2.5 MG-0.5 MG/3 ML  3 mL Nebulization Q4H PRN    magnesium oxide (MAG-OX) tablet 400 mg  400 mg Oral BID    diphenhydrAMINE (BENADRYL) capsule 25 mg  25 mg Oral BID    insulin lispro (HUMALOG) injection   SubCUTAneous AC&HS    glucose chewable tablet 16 g  16 g Oral PRN    glucagon (GLUCAGEN) injection 1 mg  1 mg IntraMUSCular PRN    dextrose (D50W) injection syrg 12.5-25 g  25-50 mL IntraVENous PRN    0.9% sodium chloride infusion 250 mL  250 mL IntraVENous PRN       Chart and notes reviewed. Data reviewed. I have evaluated and examined the patient. IMPRESSION:   Patient Active Problem List   Diagnosis Code    Nonischemic cardiomyopathy ([facility] I42.8    History of complete heart block Z86.79    Biventricular implantable cardioverter-defibrillator in situ Z95.810    Left bundle branch block (LBBB) on electrocardiogram I44.7    Type 2 diabetes mellitus with diabetic neuropathy ([facility]) E11.40    Dyslipidemia E78.5    Diabetic neuropathy associated with type 2 diabetes mellitus [facility] E11.40    Obstructive sleep apnea on CPAP G47.33, Z99.89    AICD generator infection ([facility] T82. 7XXA    Difficult airway for intubation T88. 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 1XXA    Impaired mobility and ADLs Z74.09    History of Coumadin therapy Z79.01    Gout M10.9    Acute paraplegia ([facility]) G82.20    Sepsis ([address] A41.9    Psoas abscess, right ([address] K68.12    Krueger catheter in place on admission Z96.0    Urinary tract infection due to Enterococcus N39.0, B95.2    Group B streptococcal infection A49.1    Hypervolemia E87.70    Anemia D64.9 ·         PLAN:/DISCUSSION:   · Continue other treatments        Damon Shelley MD  6/10/2017, 3:09 PM

## 2017-06-11 LAB
GLUCOSE BLD STRIP.AUTO-MCNC: 144 MG/DL (ref 70–110)
GLUCOSE BLD STRIP.AUTO-MCNC: 177 MG/DL (ref 70–110)
GLUCOSE BLD STRIP.AUTO-MCNC: 204 MG/DL (ref 70–110)
GLUCOSE BLD STRIP.AUTO-MCNC: 286 MG/DL (ref 70–110)

## 2017-06-11 PROCEDURE — 65660000000 HC RM CCU STEPDOWN

## 2017-06-11 PROCEDURE — 74011250636 HC RX REV CODE- 250/636: Performed by: FAMILY MEDICINE

## 2017-06-11 PROCEDURE — 74011250637 HC RX REV CODE- 250/637: Performed by: FAMILY MEDICINE

## 2017-06-11 PROCEDURE — 82962 GLUCOSE BLOOD TEST: CPT

## 2017-06-11 PROCEDURE — 74011250637 HC RX REV CODE- 250/637: Performed by: INTERNAL MEDICINE

## 2017-06-11 PROCEDURE — 74011000258 HC RX REV CODE- 258: Performed by: FAMILY MEDICINE

## 2017-06-11 PROCEDURE — 74011636637 HC RX REV CODE- 636/637: Performed by: FAMILY MEDICINE

## 2017-06-11 RX ADMIN — Medication 10 ML: at 14:00

## 2017-06-11 RX ADMIN — ACETAMINOPHEN 650 MG: 325 TABLET ORAL at 18:43

## 2017-06-11 RX ADMIN — MIDODRINE HYDROCHLORIDE 2.5 MG: 2.5 TABLET ORAL at 18:40

## 2017-06-11 RX ADMIN — VITAMIN D, TAB 1000IU (100/BT) 2000 UNITS: 25 TAB at 09:55

## 2017-06-11 RX ADMIN — INSULIN LISPRO 6 UNITS: 100 INJECTION, SOLUTION INTRAVENOUS; SUBCUTANEOUS at 18:44

## 2017-06-11 RX ADMIN — LACTOBACILLUS TAB 1 TABLET: TAB at 18:40

## 2017-06-11 RX ADMIN — POTASSIUM CHLORIDE 20 MEQ: 20 TABLET, EXTENDED RELEASE ORAL at 18:40

## 2017-06-11 RX ADMIN — FERROUS SULFATE TAB 325 MG (65 MG ELEMENTAL FE) 325 MG: 325 (65 FE) TAB at 18:40

## 2017-06-11 RX ADMIN — Medication 10 ML: at 22:25

## 2017-06-11 RX ADMIN — DIPHENHYDRAMINE HYDROCHLORIDE 25 MG: 25 CAPSULE ORAL at 09:55

## 2017-06-11 RX ADMIN — ALLOPURINOL 50 MG: 100 TABLET ORAL at 09:54

## 2017-06-11 RX ADMIN — FAMOTIDINE 20 MG: 20 TABLET ORAL at 22:25

## 2017-06-11 RX ADMIN — Medication 400 MG: at 10:05

## 2017-06-11 RX ADMIN — INSULIN GLARGINE 5 UNITS: 100 INJECTION, SOLUTION SUBCUTANEOUS at 22:24

## 2017-06-11 RX ADMIN — Medication 10 ML: at 07:14

## 2017-06-11 RX ADMIN — FUROSEMIDE 40 MG: 10 INJECTION, SOLUTION INTRAMUSCULAR; INTRAVENOUS at 09:54

## 2017-06-11 RX ADMIN — FOLIC ACID 1 MG: 1 TABLET ORAL at 09:55

## 2017-06-11 RX ADMIN — MIDODRINE HYDROCHLORIDE 2.5 MG: 2.5 TABLET ORAL at 09:55

## 2017-06-11 RX ADMIN — INSULIN LISPRO 2 UNITS: 100 INJECTION, SOLUTION INTRAVENOUS; SUBCUTANEOUS at 12:52

## 2017-06-11 RX ADMIN — LACTOBACILLUS TAB 1 TABLET: TAB at 09:54

## 2017-06-11 RX ADMIN — CEFTRIAXONE 2 G: 2 INJECTION, POWDER, FOR SOLUTION INTRAMUSCULAR; INTRAVENOUS at 01:09

## 2017-06-11 RX ADMIN — GABAPENTIN 600 MG: 300 CAPSULE ORAL at 18:40

## 2017-06-11 RX ADMIN — FERROUS SULFATE TAB 325 MG (65 MG ELEMENTAL FE) 325 MG: 325 (65 FE) TAB at 09:55

## 2017-06-11 RX ADMIN — POTASSIUM CHLORIDE 20 MEQ: 20 TABLET, EXTENDED RELEASE ORAL at 09:54

## 2017-06-11 RX ADMIN — INSULIN LISPRO 4 UNITS: 100 INJECTION, SOLUTION INTRAVENOUS; SUBCUTANEOUS at 22:22

## 2017-06-11 RX ADMIN — Medication 400 MG: at 18:40

## 2017-06-11 RX ADMIN — DIPHENHYDRAMINE HYDROCHLORIDE 25 MG: 25 CAPSULE ORAL at 18:40

## 2017-06-11 RX ADMIN — GABAPENTIN 600 MG: 300 CAPSULE ORAL at 09:54

## 2017-06-11 NOTE — PROGRESS NOTES
Safia Abebe M.D. 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 is stable  Mag is low, getting Magoxide BID and tolerated  She is able to move LLE slightly  VSS  Less leg edema  She feel better  No fever  Less anxious  Vital Signs:  Visit Vitals    /67 (BP 1 Location: Right arm, BP Patient Position: At rest)    Pulse 78    Temp 97.6 °F (36.4 °C)    Resp 18    Wt 99.8 kg (220 lb)  Comment: bed scale inoperable    SpO2 98%    Breastfeeding No    BMI 34.46 kg/m2       O2 Device: Room air       Temp (24hrs), Av.7 °F (37.1 °C), Min:97.6 °F (36.4 °C), Max:100.2 °F (37.9 °C)     6:23 AM  Intake/Output:   Last shift:      06/10 1901 -  0700  In: -   Out: 1675 [Urine:1675]  Last 3 shifts:  07 - 06/10 1900  In: 1080 [P.O.:1080]  Out: 3200 [Urine:3200]    Intake/Output Summary (Last 24 hours) at 17 0623  Last data filed at 17 0506   Gross per 24 hour   Intake              480 ml   Output             3275 ml   Net            -2795 ml         Physical Exam:    General: in no apparent distress    HEENT: pupils equal, no ear discharge   Neck: No abnormally enlarged lymph nodes. , no JDV   Mouth: MMM no lesions    Chest: normal, no breast masses   Lungs: normal air entry   Heart: Regular rate and rhythm   Abdomen: abdomen is soft without significant tenderness, masses, organomegaly or guarding   Extremityparaplegia   Neuro: alert    Skin: Skin color, texture, turgor normal. No rashes or lesions    Data Review:    Labs: Results:       Chemistry Recent Labs      17   0420   GLU  95   NA  136   K  3.9   CL  101   CO2  28   BUN  10   CREA  0.76   CA  9.0   AGAP  7   BUCR  13 CBC w/Diff Recent Labs      06/09/17   0420  06/08/17   0945   WBC  7.0  7.1   RBC  4.06*  3.41*   HGB  10.9*  9.3*   HCT  33.2*  27.4*   PLT  251  284   GRANS  56  61   LYMPH  29  27   EOS  3  2      Cardiac Enzymes No results for input(s): CPK, CKND1, CARLI in the last 72 hours. No lab exists for component: CKRMB, TROIP   Coagulation No results for input(s): PTP, INR, APTT in the last 72 hours. No lab exists for component: INREXT    Lipid Panel Lab Results   Component Value Date/Time    Cholesterol, total 154 07/24/2012 01:00 AM    HDL Cholesterol 48 07/24/2012 01:00 AM    LDL, calculated 90.4 07/24/2012 01:00 AM    VLDL, calculated 15.6 07/24/2012 01:00 AM    Triglyceride 78 07/24/2012 01:00 AM    CHOL/HDL Ratio 3.2 07/24/2012 01:00 AM      BNP No results for input(s): BNPP in the last 72 hours. Liver Enzymes No results for input(s): TP, ALB, TBILI, AP, SGOT, ALT, CBIL in the last 72 hours. Thyroid Studies Lab Results   Component Value Date/Time    TSH 0.51 04/20/2017 07:26 PM          Procedures/imaging: see electronic medical records for all procedures, Xrays and details which were not copied into this note but were reviewed. Allergies:   Allergies   Allergen Reactions    Vancomycin Itching    Ampicillin Itching    Bactrim [Sulfamethoxazole-Trimethoprim] Unknown (comments)    Blueberry Swelling     Causes throat swelling    Ciprofloxacin Itching    Codeine Other (comments)     Jumpy feeling    Crestor [Rosuvastatin] Itching    Darvocet A500 [Propoxyphene N-Acetaminophen] Itching    Demerol [Meperidine] Itching    Levaquin [Levofloxacin] Itching    Lipitor [Atorvastatin] Myalgia    Magnesium Oxide Itching     nausea    Minocin [Minocycline] Unknown (comments)    Pcn [Penicillins] Itching    Pravachol [Pravastatin] Swelling     Swelling in mouth     Shellfish Derived Unknown (comments)    Sulfa (Sulfonamide Antibiotics) Itching    Ultracet [Tramadol-Acetaminophen] Itching    Vicodin [Hydrocodone-Acetaminophen] Unknown (comments)    Vytorin 10-10 [Ezetimibe-Simvastatin] Myalgia    Percodan [Oxycodone Hcl-Oxycodone-Asa] Itching       Home Medications:  Prior to Admission Medications   Prescriptions Last Dose Informant Patient Reported? Taking? Lactobacillus Acidoph & Bulgar (FLORANEX) 1 million cell tab tablet   No No   Sig: Take 1 Tab by mouth two (2) times a day for 21 days. acetaminophen (TYLENOL) 325 mg tablet   No No   Sig: Take 2 Tabs by mouth every four (4) hours as needed (for fever or pain level less than 5/10). Indications: Fever, Pain   albuterol-ipratropium (DUO-NEB) 2.5 mg-0.5 mg/3 ml nebu   No No   Sig: 3 mL by Nebulization route four (4) times daily. allopurinol (ZYLOPRIM) 100 mg tablet   No No   Sig: Take 0.5 Tabs by mouth daily. Indications: HYPERURICEMIA   aluminum-magnesium hydroxide (MAALOX) 200-200 mg/5 mL suspension   No No   Sig: Take 15 mL by mouth four (4) times daily as needed for Indigestion. cefTRIAXone 2 gram 2 g IVPB   No No   Si g by IntraVENous route every twenty-four (24) hours for 20 days. cholecalciferol (VITAMIN D3) 1,000 unit tablet   No No   Sig: Take 2 Tabs by mouth daily. Indications: PREVENTION OF VITAMIN D DEFICIENCY   famotidine (PEPCID) 20 mg tablet   No No   Sig: Take 1 Tab by mouth nightly. ferrous sulfate 325 mg (65 mg iron) tablet   No No   Sig: Take 1 Tab by mouth two (2) times daily (with meals) for 14 days. folic acid (FOLVITE) 1 mg tablet   No No   Sig: Take 1 Tab by mouth daily.    insulin aspart (NOVOLOG) 100 unit/mL injection   No No   Sig: INITIATE INSULIN CORRECTIVE PROTOCOL (HR): Normal Insulin Sensitivity  For Blood Sugar (mg/dL) of:    Less than 150 =   0 units          150 -199 =   2 units 200 -249 =   4 units 250 -299 =   6 units 300 -349 =   8 units 350 and above =   10 units If 2 glucose readings are above 200 mg/dL   insulin glargine (LANTUS) 100 unit/mL injection   No No   Si Units by SubCUTAneous route nightly. Indications: type 2 diabetes mellitus   midodrine (PROAMITINE) 2.5 mg tablet   No No   Si mg [2 tablets] po three time a day for 5 days then 2.5 mg po three time a day x 5 days then stop   ondansetron (ZOFRAN ODT) 4 mg disintegrating tablet   Yes No   Sig: Take 4 mg by mouth every eight (8) hours as needed for Nausea. oxyCODONE-acetaminophen (PERCOCET 7.5) 7.5-325 mg per tablet   No No   Sig: Take 1 Tab by mouth every six (6) hours as needed (for pain level greater than 5/10). Max Daily Amount: 4 Tabs. Indications: Pain   polyethylene glycol (MIRALAX) 17 gram packet   No No   Sig: Take 1 Packet by mouth two (2) times a day. pravastatin (PRAVACHOL) 40 mg tablet   Yes No   Sig: Take 40 mg by mouth nightly. Indications: DYSLIPIDEMIA   senna-docusate (PERICOLACE) 8.6-50 mg per tablet   No No   Sig: Take 2 Tabs by mouth daily (after dinner).  Indications: Constipation      Facility-Administered Medications: None       Current Medications:  Current Facility-Administered Medications   Medication Dose Route Frequency    furosemide (LASIX) injection 40 mg  40 mg IntraVENous DAILY    gabapentin (NEURONTIN) capsule 600 mg  600 mg Oral BID    potassium chloride (K-DUR, KLOR-CON) SR tablet 20 mEq  20 mEq Oral BID    ondansetron (ZOFRAN ODT) tablet 4 mg  4 mg Oral Q8H PRN    acetaminophen (TYLENOL) tablet 650 mg  650 mg Oral Q4H PRN    senna-docusate (PERICOLACE) 8.6-50 mg per tablet 2 Tab  2 Tab Oral PCD    cholecalciferol (VITAMIN D3) tablet 2,000 Units  2,000 Units Oral DAILY    oxyCODONE-acetaminophen (PERCOCET 7.5) 7.5-325 mg per tablet 1 Tab  1 Tab Oral Q6H PRN    allopurinol (ZYLOPRIM) tablet 50 mg  50 mg Oral DAILY    midodrine (PROAMITINE) tablet 2.5 mg  2.5 mg Oral TID WITH MEALS    Lactobacillus Acidoph & Bulgar (FLORANEX) tablet 1 Tab  1 Tab Oral BID    folic acid (FOLVITE) tablet 1 mg  1 mg Oral DAILY    insulin glargine (LANTUS) injection 5 Units  5 Units SubCUTAneous QHS    ferrous sulfate tablet 325 mg  325 mg Oral BID WITH MEALS    famotidine (PEPCID) tablet 20 mg  20 mg Oral QHS    polyethylene glycol (MIRALAX) packet 17 g  17 g Oral BID    cefTRIAXone (ROCEPHIN) 2 g in 0.9% sodium chloride (MBP/ADV) 50 mL MBP  2 g IntraVENous Q24H    aluminum-magnesium hydroxide (MAALOX) oral suspension 15 mL  15 mL Oral QID PRN    sodium chloride (NS) flush 5-10 mL  5-10 mL IntraVENous Q8H    sodium chloride (NS) flush 5-10 mL  5-10 mL IntraVENous PRN    albuterol-ipratropium (DUO-NEB) 2.5 MG-0.5 MG/3 ML  3 mL Nebulization Q4H PRN    magnesium oxide (MAG-OX) tablet 400 mg  400 mg Oral BID    diphenhydrAMINE (BENADRYL) capsule 25 mg  25 mg Oral BID    insulin lispro (HUMALOG) injection   SubCUTAneous AC&HS    glucose chewable tablet 16 g  16 g Oral PRN    glucagon (GLUCAGEN) injection 1 mg  1 mg IntraMUSCular PRN    dextrose (D50W) injection syrg 12.5-25 g  25-50 mL IntraVENous PRN    0.9% sodium chloride infusion 250 mL  250 mL IntraVENous PRN       Chart and notes reviewed. Data reviewed. I have evaluated and examined the patient. IMPRESSION:   Patient Active Problem List   Diagnosis Code    [facility]ische[facility] I42.8    History of complete heart block Z86.79    Biventricular implantable cardioverter-defibrillator in situ Z95.810    Left bundle branch block (LBBB) on electrocardiogram I44.7    Type 2 diabetes mellitus with diabetic neuropathy ([facility]) E11.40    Dyslipidemia E78.5    Diabetic neuropathy associated with type 2 diabetes mellitus ([facility] E11.40    Obstructive sleep apnea on CPAP G47.33, Z99.89    AICD generator infection ([facility] T82. 7XXA    Difficult airway for intubation T88. 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 1XXA    Impaired mobility and ADLs Z74.09    History of Coumadin therapy Z79.01    Gout M10.9    Acute paraplegia ([facility]) G82.20    Sepsis ([facility] A41.9    Psoas abscess, right ([facility] K68.12    Krueger catheter in place on admission Z96.0    Urinary tract infection due to Enterococcus N39.0, B95.2    Group B streptococcal infection A49.1    Hypervolemia E87.70    Anemia D64.9 ·         PLAN:/DISCUSSION:   · As per ID        Allyn Goltz, MD  6/11/2017, 6:23 AM

## 2017-06-11 NOTE — PROGRESS NOTES
Bedside and Verbal shift change report given to DANIEL Helms (oncoming nurse) by Dipika Song RN (offgoing nurse). Report included the following information SBAR, Kardex, Intake/Output and Recent Results.

## 2017-06-12 ENCOUNTER — APPOINTMENT (OUTPATIENT)
Dept: NUCLEAR MEDICINE | Age: 67
DRG: 372 | End: 2017-06-12
Attending: INTERNAL MEDICINE
Payer: COMMERCIAL

## 2017-06-12 ENCOUNTER — APPOINTMENT (OUTPATIENT)
Dept: GENERAL RADIOLOGY | Age: 67
DRG: 372 | End: 2017-06-12
Attending: FAMILY MEDICINE
Payer: COMMERCIAL

## 2017-06-12 LAB
BACTERIA SPEC CULT: NORMAL
BACTERIA SPEC CULT: NORMAL
GLUCOSE BLD STRIP.AUTO-MCNC: 193 MG/DL (ref 70–110)
GLUCOSE BLD STRIP.AUTO-MCNC: 216 MG/DL (ref 70–110)
GLUCOSE BLD STRIP.AUTO-MCNC: 216 MG/DL (ref 70–110)
GLUCOSE BLD STRIP.AUTO-MCNC: 243 MG/DL (ref 70–110)
SERVICE CMNT-IMP: NORMAL
SERVICE CMNT-IMP: NORMAL

## 2017-06-12 PROCEDURE — 71010 XR CHEST PORT: CPT

## 2017-06-12 PROCEDURE — 74011250637 HC RX REV CODE- 250/637: Performed by: INTERNAL MEDICINE

## 2017-06-12 PROCEDURE — 65660000000 HC RM CCU STEPDOWN

## 2017-06-12 PROCEDURE — A9503 TC99M MEDRONATE: HCPCS

## 2017-06-12 PROCEDURE — 74011250636 HC RX REV CODE- 250/636: Performed by: FAMILY MEDICINE

## 2017-06-12 PROCEDURE — 74011250637 HC RX REV CODE- 250/637: Performed by: FAMILY MEDICINE

## 2017-06-12 PROCEDURE — 82962 GLUCOSE BLOOD TEST: CPT

## 2017-06-12 PROCEDURE — 74011000258 HC RX REV CODE- 258: Performed by: FAMILY MEDICINE

## 2017-06-12 PROCEDURE — 74011636637 HC RX REV CODE- 636/637: Performed by: FAMILY MEDICINE

## 2017-06-12 RX ORDER — CYCLOBENZAPRINE HCL 10 MG
10 TABLET ORAL
Status: DISCONTINUED | OUTPATIENT
Start: 2017-06-12 | End: 2017-06-14

## 2017-06-12 RX ADMIN — FOLIC ACID 1 MG: 1 TABLET ORAL at 09:00

## 2017-06-12 RX ADMIN — INSULIN LISPRO 4 UNITS: 100 INJECTION, SOLUTION INTRAVENOUS; SUBCUTANEOUS at 11:30

## 2017-06-12 RX ADMIN — FERROUS SULFATE TAB 325 MG (65 MG ELEMENTAL FE) 325 MG: 325 (65 FE) TAB at 17:49

## 2017-06-12 RX ADMIN — CEFTRIAXONE 2 G: 2 INJECTION, POWDER, FOR SOLUTION INTRAMUSCULAR; INTRAVENOUS at 01:47

## 2017-06-12 RX ADMIN — DIPHENHYDRAMINE HYDROCHLORIDE 25 MG: 25 CAPSULE ORAL at 08:30

## 2017-06-12 RX ADMIN — POTASSIUM CHLORIDE 20 MEQ: 20 TABLET, EXTENDED RELEASE ORAL at 17:49

## 2017-06-12 RX ADMIN — ACETAMINOPHEN 650 MG: 325 TABLET ORAL at 13:56

## 2017-06-12 RX ADMIN — POTASSIUM CHLORIDE 20 MEQ: 20 TABLET, EXTENDED RELEASE ORAL at 09:00

## 2017-06-12 RX ADMIN — INSULIN GLARGINE 5 UNITS: 100 INJECTION, SOLUTION SUBCUTANEOUS at 21:55

## 2017-06-12 RX ADMIN — MIDODRINE HYDROCHLORIDE 2.5 MG: 2.5 TABLET ORAL at 08:00

## 2017-06-12 RX ADMIN — Medication 400 MG: at 21:30

## 2017-06-12 RX ADMIN — INSULIN LISPRO 4 UNITS: 100 INJECTION, SOLUTION INTRAVENOUS; SUBCUTANEOUS at 17:51

## 2017-06-12 RX ADMIN — LACTOBACILLUS TAB 1 TABLET: TAB at 09:00

## 2017-06-12 RX ADMIN — GABAPENTIN 600 MG: 300 CAPSULE ORAL at 09:00

## 2017-06-12 RX ADMIN — VITAMIN D, TAB 1000IU (100/BT) 2000 UNITS: 25 TAB at 09:00

## 2017-06-12 RX ADMIN — INSULIN LISPRO 2 UNITS: 100 INJECTION, SOLUTION INTRAVENOUS; SUBCUTANEOUS at 07:30

## 2017-06-12 RX ADMIN — FAMOTIDINE 20 MG: 20 TABLET ORAL at 21:52

## 2017-06-12 RX ADMIN — DIPHENHYDRAMINE HYDROCHLORIDE 25 MG: 25 CAPSULE ORAL at 21:52

## 2017-06-12 RX ADMIN — FERROUS SULFATE TAB 325 MG (65 MG ELEMENTAL FE) 325 MG: 325 (65 FE) TAB at 08:00

## 2017-06-12 RX ADMIN — FUROSEMIDE 40 MG: 10 INJECTION, SOLUTION INTRAMUSCULAR; INTRAVENOUS at 09:00

## 2017-06-12 RX ADMIN — MIDODRINE HYDROCHLORIDE 2.5 MG: 2.5 TABLET ORAL at 12:00

## 2017-06-12 RX ADMIN — ALLOPURINOL 50 MG: 100 TABLET ORAL at 09:00

## 2017-06-12 RX ADMIN — LACTOBACILLUS TAB 1 TABLET: TAB at 17:49

## 2017-06-12 RX ADMIN — GABAPENTIN 600 MG: 300 CAPSULE ORAL at 17:49

## 2017-06-12 RX ADMIN — INSULIN LISPRO 4 UNITS: 100 INJECTION, SOLUTION INTRAVENOUS; SUBCUTANEOUS at 21:54

## 2017-06-12 RX ADMIN — MIDODRINE HYDROCHLORIDE 2.5 MG: 2.5 TABLET ORAL at 17:49

## 2017-06-12 NOTE — PROGRESS NOTES
Bedside and Verbal shift change report given to Karen Hanna RN (oncoming nurse) by Memo Saleh RN (offgoing nurse). Report included the following information SBAR, Kardex, MAR and Recent Results.

## 2017-06-12 NOTE — PROGRESS NOTES
Infectious Disease progress Note    Requested by: Dr. Cyndy Carr    Reason: sepsis    Current abx Prior abx   Ceftriaxone 4/24-5/15; 5/22- till date Fluconazole 5/16-5/30  Meropenem 5/15-5/22  Cefepime 4/20-4/21     Lines:       Assessment :      77 y.o., right handed female, with an established history of hypertension, complete heart block with permanent pacemaker placed, diabetes mellitus, diabetic peripheral neuropathy, obesity, admitted to [facility] on 6/6/2017.      Recent hospitalization (4/20/17-5/15/17) for Sepsis  secondary to group B streptococcus bloodstream infection (positive blood cultures 4/20, negative blood cultures 4/21). Most likely source of bloodstream infection is infected right psoas hematoma/abscess. 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       Enterococcus in urine cultures 4/20 likely colonizer    bilateral LE dvt - s/p IVC filter placement 5/11    Now with low grade fevers, abdominal pain, acute anemia. Highly complex clinical picture. Difficult to determine etiology of patients' symptoms. Differential diagnosis: undiagnosed GI bleed/anemia of chronic disease from spinal osteomyelitis   Persistent upper abdominal discomfort concerning for undiagnosed gastritis/peptic ulcer disease. Will f/u bone scan to rule out spinal infection/radiculopathy as the cause of upper abdominal pain    Abdominal pain and pain radiating down to right leg is likely radiculopathy/neuropathy - significant clinical improvement after initiation of neurontin    Constipation as seen on ct scan may be contributing to abdominal pain. 6x1 cm residual collection right psoas is likely serous fluid - most recent drains only drained serous material. Lack of fevers/increasing wbc/worsening right flank tenderness argues against worsening infection.  Hence, will monitor clinically      patient doesn't have erythema at the site of pacemaker. No fluid collection noted at pacemaker pocket site per CT scan 6/7/17 - discussed with radiologist. At this time risk of removal of pacemaker/general pocket change exceed the benefit.        No evidence of pneumonia. No significant pyuria.      No leukocytosis        Recommendations:     1. continue ceftriaxone  2. Obtain bone scan to evaluate for chronic thoracolumbar spine osteomyelitis as the cause of anemia  3. Recommend EGD if etiology of anemia remains unidentified and positive stool occult blood.      Above plan was discussed in details with patient, daughter at bedside. Please call me if any further questions or concerns. Will continue to participate in the care of this patient. subjective:    Feels better than before. Still with upper abdominal pain. improved right leg pain/flank pain. No fever, chills, diarrhea. Patient denies headaches, visual disturbances, sore throat, runny nose, earaches, cp, sob, chills, cough, diarrhea, burning micturition, increased pain or weakness in extremities. Home Medication List    Details   oxyCODONE-acetaminophen (PERCOCET 7.5) 7.5-325 mg per tablet Take 1 Tab by mouth every six (6) hours as needed (for pain level greater than 5/10). Max Daily Amount: 4 Tabs. Indications: Pain  Qty: 20 Tab, Refills: 0      albuterol-ipratropium (DUO-NEB) 2.5 mg-0.5 mg/3 ml nebu 3 mL by Nebulization route four (4) times daily. Qty: 30 Nebule, Refills: 0      allopurinol (ZYLOPRIM) 100 mg tablet Take 0.5 Tabs by mouth daily. Indications: HYPERURICEMIA  Qty: 30 Tab, Refills: 0      cefTRIAXone 2 gram 2 g IVPB 2 g by IntraVENous route every twenty-four (24) hours for 20 days.   Qty: 20 Dose, Refills: 0      midodrine (PROAMITINE) 2.5 mg tablet 5 mg [2 tablets] po three time a day for 5 days then 2.5 mg po three time a day x 5 days then stop  Qty: 45 Tab, Refills: 0      Lactobacillus Acidoph & Bulgar (FLORANEX) 1 million cell tab tablet Take 1 Tab by mouth two (2) times a day for 21 days. Qty: 42 Tab, Refills: 0      aluminum-magnesium hydroxide (MAALOX) 200-200 mg/5 mL suspension Take 15 mL by mouth four (4) times daily as needed for Indigestion. Qty: 100 mL, Refills: 0      folic acid (FOLVITE) 1 mg tablet Take 1 Tab by mouth daily. Qty: 30 Tab, Refills: 0      insulin glargine (LANTUS) 100 unit/mL injection 5 Units by SubCUTAneous route nightly. Indications: type 2 diabetes mellitus  Qty: 1 Vial, Refills: 0    Associated Diagnoses: Type 2 diabetes mellitus with diabetic neuropathy, with long-term current use of insulin (HCC)      ferrous sulfate 325 mg (65 mg iron) tablet Take 1 Tab by mouth two (2) times daily (with meals) for 14 days. Qty: 28 Tab, Refills: 0      famotidine (PEPCID) 20 mg tablet Take 1 Tab by mouth nightly. Qty: 30 Tab, Refills: 0      polyethylene glycol (MIRALAX) 17 gram packet Take 1 Packet by mouth two (2) times a day. Qty: 30 Packet, Refills: 0      cholecalciferol (VITAMIN D3) 1,000 unit tablet Take 2 Tabs by mouth daily. Indications: PREVENTION OF VITAMIN D DEFICIENCY  Qty: 30 Tab, Refills: 0      ondansetron (ZOFRAN ODT) 4 mg disintegrating tablet Take 4 mg by mouth every eight (8) hours as needed for Nausea. acetaminophen (TYLENOL) 325 mg tablet Take 2 Tabs by mouth every four (4) hours as needed (for fever or pain level less than 5/10). Indications: Fever, Pain  Qty: 30 Tab, Refills: 0    Associated Diagnoses: Iliopsoas muscle hematoma, right, subsequent encounter      senna-docusate (PERICOLACE) 8.6-50 mg per tablet Take 2 Tabs by mouth daily (after dinner). Indications: Constipation  Qty: 30 Tab, Refills: 0      pravastatin (PRAVACHOL) 40 mg tablet Take 40 mg by mouth nightly.  Indications: DYSLIPIDEMIA      insulin aspart (NOVOLOG) 100 unit/mL injection INITIATE INSULIN CORRECTIVE PROTOCOL (HR): Normal Insulin Sensitivity  For Blood Sugar (mg/dL) of:    Less than 150 =   0 units 150 -199 =   2 units 200 -249 =   4 units 250 -299 =   6 units 300 -349 =   8 units 350 and above =   10 units If 2 glucose readings are above 200 mg/dL  Qty: 10 mL, Refills: 6             Current Facility-Administered Medications   Medication Dose Route Frequency    furosemide (LASIX) injection 40 mg  40 mg IntraVENous DAILY    gabapentin (NEURONTIN) capsule 600 mg  600 mg Oral BID    potassium chloride (K-DUR, KLOR-CON) SR tablet 20 mEq  20 mEq Oral BID    ondansetron (ZOFRAN ODT) tablet 4 mg  4 mg Oral Q8H PRN    acetaminophen (TYLENOL) tablet 650 mg  650 mg Oral Q4H PRN    senna-docusate (PERICOLACE) 8.6-50 mg per tablet 2 Tab  2 Tab Oral PCD    cholecalciferol (VITAMIN D3) tablet 2,000 Units  2,000 Units Oral DAILY    oxyCODONE-acetaminophen (PERCOCET 7.5) 7.5-325 mg per tablet 1 Tab  1 Tab Oral Q6H PRN    allopurinol (ZYLOPRIM) tablet 50 mg  50 mg Oral DAILY    midodrine (PROAMITINE) tablet 2.5 mg  2.5 mg Oral TID WITH MEALS    Lactobacillus Acidoph & Bulgar (FLORANEX) tablet 1 Tab  1 Tab Oral BID    folic acid (FOLVITE) tablet 1 mg  1 mg Oral DAILY    insulin glargine (LANTUS) injection 5 Units  5 Units SubCUTAneous QHS    ferrous sulfate tablet 325 mg  325 mg Oral BID WITH MEALS    famotidine (PEPCID) tablet 20 mg  20 mg Oral QHS    polyethylene glycol (MIRALAX) packet 17 g  17 g Oral BID    cefTRIAXone (ROCEPHIN) 2 g in 0.9% sodium chloride (MBP/ADV) 50 mL MBP  2 g IntraVENous Q24H    aluminum-magnesium hydroxide (MAALOX) oral suspension 15 mL  15 mL Oral QID PRN    sodium chloride (NS) flush 5-10 mL  5-10 mL IntraVENous Q8H    sodium chloride (NS) flush 5-10 mL  5-10 mL IntraVENous PRN    albuterol-ipratropium (DUO-NEB) 2.5 MG-0.5 MG/3 ML  3 mL Nebulization Q4H PRN    magnesium oxide (MAG-OX) tablet 400 mg  400 mg Oral BID    diphenhydrAMINE (BENADRYL) capsule 25 mg  25 mg Oral BID    insulin lispro (HUMALOG) injection   SubCUTAneous AC&HS    glucose chewable tablet 16 g  16 g Oral PRN    glucagon (GLUCAGEN) injection 1 mg  1 mg IntraMUSCular PRN    dextrose (D50W) injection syrg 12.5-25 g  25-50 mL IntraVENous PRN    0.9% sodium chloride infusion 250 mL  250 mL IntraVENous PRN       Allergies: Vancomycin; Ampicillin; Bactrim [sulfamethoxazole-trimethoprim]; Blueberry; Ciprofloxacin; Codeine; Crestor [rosuvastatin]; Darvocet a500 [propoxyphene n-acetaminophen]; Demerol [meperidine]; Levaquin [levofloxacin]; Lipitor [atorvastatin]; Magnesium oxide; Minocin [minocycline]; Pcn [penicillins]; Pravachol [pravastatin]; Shellfish derived; Sulfa (sulfonamide antibiotics); Ultracet [tramadol-acetaminophen]; Vicodin [hydrocodone-acetaminophen]; Vytorin 10-10 [ezetimibe-simvastatin]; and Percodan [oxycodone hcl-oxycodone-asa]    ROS: 12 point ROS obtained in details. Pertinent positives as mentioned in HPI,   otherwise negative    Physical Exam:    General: Well developed, well nourished female laying on the bed, AAOx3 NAD     General:  awake alert and oriented   HEENT:  Normocephalic, atraumatic, PERRL, EOMI, no scleral icterus or pallor; no conjunctival hemmohage; nasal and oral mucous are moist and without evidence of lesions. Neck supple, no bruits. Lymph Nodes:  no cervical, axillary or inguinal adenopathy   Lungs:  non-labored, bilaterally clear to auscultation- no crackles wheezes rales or rhonchi   Heart:  s1 and s2 irregular; no rubs or gallops, no edema, + pedal pulses   Abdomen: soft, non-distended, active bowel sounds, no hepatomegaly, no splenomegaly. no tenderness over the left abdominal pacemaker, no overlying erythema or fluctuance, present epigastric/RUQ tenderness   Genitourinary: Krueger in place   Extremities:  no clubbing, cyanosis; no joint effusions or swelling; muscle mass appropriate for age   Neurologic:  No gross focal sensory abnormalities; 5/5 muscle strength to upper extremities. 0/5 strength in lower extremities.  Cranial nerves intact   Skin:  Surgical scars abdomen, left neck well healed   Back: no paraspinal muscle guarding or rigidity, resolved right CVA tenderness, tenderness over lumbar spine around L4-L5   Psychiatric:  No suicidal or homicidal ideations, appropriate mood and affect          Labs: Results:   Chemistry No results for input(s): GLU, NA, K, CL, CO2, BUN, CREA, CA, AGAP, BUCR, TBIL, GPT, AP, TP, ALB, GLOB, AGRAT in the last 72 hours. CBC w/Diff No results for input(s): WBC, RBC, HGB, HCT, PLT, GRANS, LYMPH, EOS, HGBEXT, HCTEXT, PLTEXT, HGBEXT, HCTEXT, PLTEXT in the last 72 hours. Microbiology No results for input(s): CULT in the last 72 hours.        RADIOLOGY:    All available imaging studies/reports in [facility] care for this admission were reviewed    Dr. José Buck, Infectious Disease Specialist  [phone]  June 12, 2017  8:43 AM

## 2017-06-12 NOTE — ADT AUTH CERT NOTES
Utilization Review           Anemia, Iron Deficiency or Unspecified - Care Day 6 (6/11/2017) by Yancy Lisa RN        Review Status Review Entered       Completed 6/12/2017       Details              Care Day: 6 Care Date: 6/11/2017 Level of Care:        Guideline Day 2        Clinical Status       (X) * Hemodynamic stability       (X) * Active blood loss absent       (X) * Signs and symptoms of anemia improved or absent       (X) * Hgb level acceptable and stable       ( ) * Underlying disorder absent or controlled       ( ) * Discharge plans and education understood              Activity       ( ) * Ambulatory [G]              Routes       ( ) * Oral hydration, medications, and diet              6/12/2017 10:51 AM EDT by Cristobal Shetty       Subject: Additional Clinical Information       vs: 98.5, 83, 144/69, 20, 97%orders: diabetic diet, picc line, cardiac monitor, vs continuous, bedrest, foleymeds: rocephin 2g iv daily, lasix 40mg iv dialy, labs: no labs for 6/11  Hg 10 gms posttransfusion, plan colo as outpatient per GI  ID following for possible psoas infection which she had prviously  Paraplegia 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 is stable  Mag is low, getting Magoxide BID and tolerated  She is able to move LLE slightly  VSS  Less leg edema  She feel better  No fever  Less anxious                                   * Milestone                  Anemia, Iron Deficiency or Unspecified - Care Day 4 (6/9/2017) by Yancy Lisa RN        Review Status Review Entered       Completed 6/9/2017       Details              Care Day: 4 Care Date: 6/9/2017 Level of Care:        Guideline Day 2        Clinical Status       (X) * Hemodynamic stability       (X) * Active blood loss absent       (X) * Signs and symptoms of anemia improved or absent       (X) * Hgb level acceptable and stable       ( ) * Underlying disorder absent or controlled       ( ) * Discharge plans and education understood              Activity       ( ) * Ambulatory [G]              Routes       ( ) * Oral hydration, medications, and diet              6/9/2017 11:44 AM EDT by Emma Alex       Subject: Additional Clinical Information       vs: 99.1, 88, 150/77, 18, 95%orders: diabetic diet, bone marrow scan, wound care, perez, vs continuous, bedrest, cardaic monitormeds: rocephin 2g iv daily, lasix 40mg daily, labs: hgb 10.9, hct 33.2,   Highly complex clinical picture. Difficult to determine etiology of patients' symptoms. Differential diagnosis: undiagnosed GI bleed/anemia of chronic disease from spinal osteomyelitis       Abdominal pain and pain radiating down to right leg is likely radiculopathy/neuropathy - significant clinical improvement after initiation of neurontin      Constipation as seen on ct scan may be contributing to abdominal pain. 6x1 cm residual collection right psoas is likely serous fluid - most recent drains only drained serous material. Lack of fevers/increasing wbc/worsening right flank tenderness argues against worsening infection. Hence, will monitor clinically          patient doesn't have erythema at the site of pacemaker. No fluid collection noted at pacemaker pocket site per CT scan 6/7/17 - discussed with radiologist. At this time risk of removal of pacemaker/general pocket change exceed the benefit.         No evidence of pneumonia. No significant pyuria.         No leukocytosis             Recommendations:        1. continue ceftriaxone  2. Obtain bone scan to evaluate for chronic thoracolumbar spine osteomyelitis as the cause of anemia  3. F/u blood cultures  4.  Recommend EGD if etiology of anemia remains unidentified and positive stool occult blood.                                     * Milestone                  Anemia, Iron Deficiency or Unspecified - Care Day 3 (6/8/2017) by Tyler North RN        Review Status Review Entered       Completed 6/8/2017       Details              Care Day: 3 Care Date: 6/8/2017 Level of Care:      Guideline Day 2        Clinical Status       (X) * Hemodynamic stability       (X) * Active blood loss absent       (X) * Signs and symptoms of anemia improved or absent       (X) * Hgb level acceptable and stable       ( ) * Underlying disorder absent or controlled       ( ) * Discharge plans and education understood              Activity       ( ) * Ambulatory [G]              Routes       ( ) * Oral hydration, medications, and diet              6/8/2017 2:23 PM EDT by Fernando Shrestha       Subject: Additional Clinical Information       vs: 98.2, 87, 145/76, 18, 97%orders: diabetic diet, bedrest, cardiac monitor, vs continuousmeds: rocephin 2g iv, lasix 40mg daily, kcl 10meq iv x 2labs: hgb 9.3, hct 27.4,   Now with low grade fevers, abdominal pain, acute anemia.       Highly complex clinical picture. Difficult to determine etiology of patients' symptoms. Differential diagnosis: undiagnosed GI bleed/anemia of chronic disease from spinal osteomyelitis & abdominal pain due to radiculopathy. Rule out right hip osteomyelitis.      Pain radiating down to right leg is likely radiculopathy/neuropathy - patient was on neurontin prior to last admission and currently is not on it      patient doesn't have erythema at the site of pacemaker. No fluid collection noted at pacemaker pocket site per CT scan 6/7/17 - discussed with radiologist. At this time risk of removal of pacemaker/general pocket change exceed the benefit.         No evidence of pneumonia. No significant pyuria.         No leukocytosis             Recommendations:        1. continue ceftriaxone  2. Obtain ct abd/pelvis with iv contrast, ct right hip  3. Restart neurontin  4. Stool occult blood  5. F/u blood cultures  6. Change perez  7. Will obtain bone scan if ct scan non diagnostic to evaluate for osteomyelitis of spine  8.  Recommend EGD if etiology of anemia remains unidentified and positive stool occult blood.

## 2017-06-12 NOTE — PROGRESS NOTES
Hyacinth Logan M.D. PROGRESS NOTE    Name: Hettie Libman MRN: [id number]   : 1950 Hospital: [facility]   Date: 2017  Admission Date: 2017     Subjective/Objective/Plans  Hg 10 gms posttransfusion, plan colo as outpatient per GI  ID following for possible psoas infection which she had prviously  Paraplegia 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 is stable  Mag is low, getting Magoxide BID and tolerated  Having muscle spasms, Ca and Na ok  Less edema    Vital Signs:  Visit Vitals    BP 96/60 (BP 1 Location: Left arm, BP Patient Position: At rest)    Pulse 77    Temp 98.6 °F (37 °C)    Resp 18    Wt 99.8 kg (220 lb)  Comment: bed scale inoperable    SpO2 98%    Breastfeeding No    BMI 34.46 kg/m2       O2 Device: Room air       Temp (24hrs), Av.3 °F (36.8 °C), Min:97 °F (36.1 °C), Max:99 °F (37.2 °C)     4:46 PM  Intake/Output:   Last shift:       07 -  190  In: 740 [P.O.:740]  Out: 1650 [Urine:1650]  Last 3 shifts: 06/10 1901 -  0700  In: 955 [P.O.:955]  Out: 4975 [Urine:4975]    Intake/Output Summary (Last 24 hours) at 17 1646  Last data filed at 17 1517   Gross per 24 hour   Intake              975 ml   Output             2900 ml   Net            -1925 ml         Physical Exam:    General: in no apparent distress    HEENT: pupils equal, no ear discharge   Neck: No abnormally enlarged lymph nodes. , no JDV   Mouth: MMM no lesions    Chest: normal, no breast masses   Lungs: normal air entry   Heart: Regular rate and rhythm   Abdomen: abdomen is soft without significant tenderness, masses, organomegaly or guarding   Extremity: paraplegia, edema much improved   Neuro: alert    Skin: Skin color, texture, turgor normal. No rashes or lesions    Data Review:    Labs: Results:       Chemistry No results for input(s): GLU, NA, K, CL, CO2, BUN, CREA, CA, AGAP, BUCR, TBILI, GPT, AP, TP, ALB, GLOB, AGRAT in the last 72 hours.    CBC w/Diff No results for input(s): WBC, RBC, HGB, HCT, PLT, GRANS, LYMPH, EOS, HGBEXT, HCTEXT, PLTEXT in the last 72 hours. Cardiac Enzymes No results for input(s): CPK, CKND1, CARLI in the last 72 hours. No lab exists for component: CKRMB, TROIP   Coagulation No results for input(s): PTP, INR, APTT in the last 72 hours. No lab exists for component: INREXT    Lipid Panel Lab Results   Component Value Date/Time    Cholesterol, total 154 07/24/2012 01:00 AM    HDL Cholesterol 48 07/24/2012 01:00 AM    LDL, calculated 90.4 07/24/2012 01:00 AM    VLDL, calculated 15.6 07/24/2012 01:00 AM    Triglyceride 78 07/24/2012 01:00 AM    CHOL/HDL Ratio 3.2 07/24/2012 01:00 AM      BNP No results for input(s): BNPP in the last 72 hours. Liver Enzymes No results for input(s): TP, ALB, TBILI, AP, SGOT, ALT, CBIL in the last 72 hours. Thyroid Studies Lab Results   Component Value Date/Time    TSH 0.51 04/20/2017 07:26 PM          Procedures/imaging: see electronic medical records for all procedures, Xrays and details which were not copied into this note but were reviewed. Allergies:   Allergies   Allergen Reactions    Vancomycin Itching    Ampicillin Itching    Bactrim [Sulfamethoxazole-Trimethoprim] Unknown (comments)    Blueberry Swelling     Causes throat swelling    Ciprofloxacin Itching    Codeine Other (comments)     Jumpy feeling    Crestor [Rosuvastatin] Itching    Darvocet A500 [Propoxyphene N-Acetaminophen] Itching    Demerol [Meperidine] Itching    Levaquin [Levofloxacin] Itching    Lipitor [Atorvastatin] Myalgia    Magnesium Oxide Itching     nausea    Minocin [Minocycline] Unknown (comments)    Pcn [Penicillins] Itching    Pravachol [Pravastatin] Swelling     Swelling in mouth     Shellfish Derived Unknown (comments)    Sulfa (Sulfonamide Antibiotics) Itching    Ultracet [Tramadol-Acetaminophen] Itching    Vicodin [Hydrocodone-Acetaminophen] Unknown (comments)    Vytorin 10-10 [Ezetimibe-Simvastatin] Myalgia    Percodan [Oxycodone Hcl-Oxycodone-Asa] Itching       Home Medications:  Prior to Admission Medications   Prescriptions Last Dose Informant Patient Reported? Taking? Lactobacillus Acidoph & Bulgar (FLORANEX) 1 million cell tab tablet   No No   Sig: Take 1 Tab by mouth two (2) times a day for 21 days. acetaminophen (TYLENOL) 325 mg tablet   No No   Sig: Take 2 Tabs by mouth every four (4) hours as needed (for fever or pain level less than 5/10). Indications: Fever, Pain   albuterol-ipratropium (DUO-NEB) 2.5 mg-0.5 mg/3 ml nebu   No No   Sig: 3 mL by Nebulization route four (4) times daily. allopurinol (ZYLOPRIM) 100 mg tablet   No No   Sig: Take 0.5 Tabs by mouth daily. Indications: HYPERURICEMIA   aluminum-magnesium hydroxide (MAALOX) 200-200 mg/5 mL suspension   No No   Sig: Take 15 mL by mouth four (4) times daily as needed for Indigestion. cefTRIAXone 2 gram 2 g IVPB   No No   Si g by IntraVENous route every twenty-four (24) hours for 20 days. cholecalciferol (VITAMIN D3) 1,000 unit tablet   No No   Sig: Take 2 Tabs by mouth daily. Indications: PREVENTION OF VITAMIN D DEFICIENCY   famotidine (PEPCID) 20 mg tablet   No No   Sig: Take 1 Tab by mouth nightly. ferrous sulfate 325 mg (65 mg iron) tablet   No No   Sig: Take 1 Tab by mouth two (2) times daily (with meals) for 14 days. folic acid (FOLVITE) 1 mg tablet   No No   Sig: Take 1 Tab by mouth daily. insulin aspart (NOVOLOG) 100 unit/mL injection   No No   Sig: INITIATE INSULIN CORRECTIVE PROTOCOL (HR): Normal Insulin Sensitivity  For Blood Sugar (mg/dL) of:    Less than 150 =   0 units          150 -199 =   2 units 200 -249 =   4 units 250 -299 =   6 units 300 -349 =   8 units 350 and above =   10 units If 2 glucose readings are above 200 mg/dL   insulin glargine (LANTUS) 100 unit/mL injection   No No   Si Units by SubCUTAneous route nightly.  Indications: type 2 diabetes mellitus   midodrine (PROAMITINE) 2.5 mg tablet   No No   Si mg [2 tablets] po three time a day for 5 days then 2.5 mg po three time a day x 5 days then stop   ondansetron (ZOFRAN ODT) 4 mg disintegrating tablet   Yes No   Sig: Take 4 mg by mouth every eight (8) hours as needed for Nausea. oxyCODONE-acetaminophen (PERCOCET 7.5) 7.5-325 mg per tablet   No No   Sig: Take 1 Tab by mouth every six (6) hours as needed (for pain level greater than 5/10). Max Daily Amount: 4 Tabs. Indications: Pain   polyethylene glycol (MIRALAX) 17 gram packet   No No   Sig: Take 1 Packet by mouth two (2) times a day. pravastatin (PRAVACHOL) 40 mg tablet   Yes No   Sig: Take 40 mg by mouth nightly. Indications: DYSLIPIDEMIA   senna-docusate (PERICOLACE) 8.6-50 mg per tablet   No No   Sig: Take 2 Tabs by mouth daily (after dinner).  Indications: Constipation      Facility-Administered Medications: None       Current Medications:  Current Facility-Administered Medications   Medication Dose Route Frequency    cyclobenzaprine (FLEXERIL) tablet 10 mg  10 mg Oral TID PRN    furosemide (LASIX) injection 40 mg  40 mg IntraVENous DAILY    gabapentin (NEURONTIN) capsule 600 mg  600 mg Oral BID    potassium chloride (K-DUR, KLOR-CON) SR tablet 20 mEq  20 mEq Oral BID    ondansetron (ZOFRAN ODT) tablet 4 mg  4 mg Oral Q8H PRN    acetaminophen (TYLENOL) tablet 650 mg  650 mg Oral Q4H PRN    senna-docusate (PERICOLACE) 8.6-50 mg per tablet 2 Tab  2 Tab Oral PCD    cholecalciferol (VITAMIN D3) tablet 2,000 Units  2,000 Units Oral DAILY    oxyCODONE-acetaminophen (PERCOCET 7.5) 7.5-325 mg per tablet 1 Tab  1 Tab Oral Q6H PRN    allopurinol (ZYLOPRIM) tablet 50 mg  50 mg Oral DAILY    midodrine (PROAMITINE) tablet 2.5 mg  2.5 mg Oral TID WITH MEALS    Lactobacillus Acidoph & Bulgar (FLORANEX) tablet 1 Tab  1 Tab Oral BID    folic acid (FOLVITE) tablet 1 mg  1 mg Oral DAILY    insulin glargine (LANTUS) injection 5 Units  5 Units SubCUTAneous QHS    ferrous sulfate tablet 325 mg  325 mg Oral BID WITH MEALS    famotidine (PEPCID) tablet 20 mg  20 mg Oral QHS    polyethylene glycol (MIRALAX) packet 17 g  17 g Oral BID    cefTRIAXone (ROCEPHIN) 2 g in 0.9% sodium chloride (MBP/ADV) 50 mL MBP  2 g IntraVENous Q24H    aluminum-magnesium hydroxide (MAALOX) oral suspension 15 mL  15 mL Oral QID PRN    sodium chloride (NS) flush 5-10 mL  5-10 mL IntraVENous Q8H    sodium chloride (NS) flush 5-10 mL  5-10 mL IntraVENous PRN    albuterol-ipratropium (DUO-NEB) 2.5 MG-0.5 MG/3 ML  3 mL Nebulization Q4H PRN    magnesium oxide (MAG-OX) tablet 400 mg  400 mg Oral BID    diphenhydrAMINE (BENADRYL) capsule 25 mg  25 mg Oral BID    insulin lispro (HUMALOG) injection   SubCUTAneous AC&HS    glucose chewable tablet 16 g  16 g Oral PRN    glucagon (GLUCAGEN) injection 1 mg  1 mg IntraMUSCular PRN    dextrose (D50W) injection syrg 12.5-25 g  25-50 mL IntraVENous PRN    0.9% sodium chloride infusion 250 mL  250 mL IntraVENous PRN       Chart and notes reviewed. Data reviewed. I have evaluated and examined the patient. IMPRESSION:   Patient Active Problem List   Diagnosis Code    Nonischemic cardiomyopathy ([facility] I42.8    History of complete heart block Z86.79    Biventricular implantable cardioverter-defibrillator in situ Z95.810    Left bundle branch block (LBBB) on electrocardiogram I44.7    Type 2 diabetes mellitus with diabetic neuropathy ([facility]) E11.40    Dyslipidemia E78.5    Diabetic neuropathy associated with type 2 diabetes mellitus ([facility] E11.40    Obstructive sleep apnea on CPAP G47.33, Z99.89    AICD generator infection ([facility] T82. 7XXA    Difficult airway for intubation T88. 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 1XXA    Impaired mobility and ADLs Z74.09    History of Coumadin therapy Z79.01    Gout M10.9    Acute paraplegia ([facility]) G82.20    Sepsis ([facility] A41.9    Psoas abscess, right ([facility] K68.12    Krueger catheter in place on admission Z96.0    Urinary tract infection due to Enterococcus N39.0, B95.2    Group B streptococcal infection A49.1    Hypervolemia E87.70    Anemia D64.9 ·         PLAN:/DISCUSSION:   · As per ID  · Add Flexeril  · Repeat CXR, BNP        Damon Shelley MD  6/12/2017, 4:46 PM

## 2017-06-13 ENCOUNTER — APPOINTMENT (OUTPATIENT)
Dept: INTERVENTIONAL RADIOLOGY/VASCULAR | Age: 67
DRG: 372 | End: 2017-06-13
Attending: FAMILY MEDICINE
Payer: COMMERCIAL

## 2017-06-13 LAB
BNP SERPL-MCNC: 6971 PG/ML (ref 0–900)
GLUCOSE BLD STRIP.AUTO-MCNC: 151 MG/DL (ref 70–110)
GLUCOSE BLD STRIP.AUTO-MCNC: 177 MG/DL (ref 70–110)
GLUCOSE BLD STRIP.AUTO-MCNC: 241 MG/DL (ref 70–110)
MAGNESIUM SERPL-MCNC: 1.7 MG/DL (ref 1.6–2.6)

## 2017-06-13 PROCEDURE — 74011250637 HC RX REV CODE- 250/637: Performed by: FAMILY MEDICINE

## 2017-06-13 PROCEDURE — 36415 COLL VENOUS BLD VENIPUNCTURE: CPT | Performed by: FAMILY MEDICINE

## 2017-06-13 PROCEDURE — C1769 GUIDE WIRE: HCPCS

## 2017-06-13 PROCEDURE — C1892 INTRO/SHEATH,FIXED,PEEL-AWAY: HCPCS

## 2017-06-13 PROCEDURE — 02HV33Z INSERTION OF INFUSION DEVICE INTO SUPERIOR VENA CAVA, PERCUTANEOUS APPROACH: ICD-10-PCS | Performed by: RADIOLOGY

## 2017-06-13 PROCEDURE — 74011000258 HC RX REV CODE- 258: Performed by: FAMILY MEDICINE

## 2017-06-13 PROCEDURE — 83735 ASSAY OF MAGNESIUM: CPT | Performed by: FAMILY MEDICINE

## 2017-06-13 PROCEDURE — 74011636320 HC RX REV CODE- 636/320: Performed by: RADIOLOGY

## 2017-06-13 PROCEDURE — C1893 INTRO/SHEATH, FIXED,NON-PEEL: HCPCS

## 2017-06-13 PROCEDURE — 74011250637 HC RX REV CODE- 250/637: Performed by: INTERNAL MEDICINE

## 2017-06-13 PROCEDURE — 74011636637 HC RX REV CODE- 636/637: Performed by: FAMILY MEDICINE

## 2017-06-13 PROCEDURE — 77030008584 HC TOOL GDWRE DEV TERU -A

## 2017-06-13 PROCEDURE — 83880 ASSAY OF NATRIURETIC PEPTIDE: CPT | Performed by: FAMILY MEDICINE

## 2017-06-13 PROCEDURE — 76937 US GUIDE VASCULAR ACCESS: CPT

## 2017-06-13 PROCEDURE — 82962 GLUCOSE BLOOD TEST: CPT

## 2017-06-13 PROCEDURE — 74011250636 HC RX REV CODE- 250/636: Performed by: FAMILY MEDICINE

## 2017-06-13 PROCEDURE — 65660000000 HC RM CCU STEPDOWN

## 2017-06-13 PROCEDURE — C1894 INTRO/SHEATH, NON-LASER: HCPCS

## 2017-06-13 PROCEDURE — C1887 CATHETER, GUIDING: HCPCS

## 2017-06-13 RX ORDER — SODIUM CHLORIDE 0.9 % (FLUSH) 0.9 %
20 SYRINGE (ML) INJECTION EVERY 24 HOURS
Status: DISCONTINUED | OUTPATIENT
Start: 2017-06-13 | End: 2017-06-18

## 2017-06-13 RX ORDER — SODIUM CHLORIDE 0.9 % (FLUSH) 0.9 %
10 SYRINGE (ML) INJECTION AS NEEDED
Status: DISCONTINUED | OUTPATIENT
Start: 2017-06-13 | End: 2017-06-22 | Stop reason: HOSPADM

## 2017-06-13 RX ORDER — SODIUM CHLORIDE 0.9 % (FLUSH) 0.9 %
10-30 SYRINGE (ML) INJECTION AS NEEDED
Status: DISCONTINUED | OUTPATIENT
Start: 2017-06-13 | End: 2017-06-18

## 2017-06-13 RX ORDER — SODIUM CHLORIDE 0.9 % (FLUSH) 0.9 %
10-40 SYRINGE (ML) INJECTION EVERY 8 HOURS
Status: DISCONTINUED | OUTPATIENT
Start: 2017-06-13 | End: 2017-06-18

## 2017-06-13 RX ORDER — IODIXANOL 320 MG/ML
50 INJECTION, SOLUTION INTRAVASCULAR
Status: COMPLETED | OUTPATIENT
Start: 2017-06-13 | End: 2017-06-13

## 2017-06-13 RX ORDER — SODIUM CHLORIDE 0.9 % (FLUSH) 0.9 %
10 SYRINGE (ML) INJECTION EVERY 24 HOURS
Status: DISCONTINUED | OUTPATIENT
Start: 2017-06-13 | End: 2017-06-22 | Stop reason: HOSPADM

## 2017-06-13 RX ADMIN — Medication 400 MG: at 09:51

## 2017-06-13 RX ADMIN — INSULIN GLARGINE 5 UNITS: 100 INJECTION, SOLUTION SUBCUTANEOUS at 22:57

## 2017-06-13 RX ADMIN — DIPHENHYDRAMINE HYDROCHLORIDE 25 MG: 25 CAPSULE ORAL at 09:51

## 2017-06-13 RX ADMIN — ALLOPURINOL 50 MG: 100 TABLET ORAL at 09:51

## 2017-06-13 RX ADMIN — FUROSEMIDE 40 MG: 10 INJECTION, SOLUTION INTRAMUSCULAR; INTRAVENOUS at 09:51

## 2017-06-13 RX ADMIN — MIDODRINE HYDROCHLORIDE 2.5 MG: 2.5 TABLET ORAL at 11:58

## 2017-06-13 RX ADMIN — LACTOBACILLUS TAB 1 TABLET: TAB at 09:51

## 2017-06-13 RX ADMIN — INSULIN LISPRO 2 UNITS: 100 INJECTION, SOLUTION INTRAVENOUS; SUBCUTANEOUS at 10:10

## 2017-06-13 RX ADMIN — IODIXANOL 50 ML: 320 INJECTION, SOLUTION INTRAVASCULAR at 08:58

## 2017-06-13 RX ADMIN — POTASSIUM CHLORIDE 20 MEQ: 20 TABLET, EXTENDED RELEASE ORAL at 09:50

## 2017-06-13 RX ADMIN — LACTOBACILLUS TAB 1 TABLET: TAB at 18:27

## 2017-06-13 RX ADMIN — MIDODRINE HYDROCHLORIDE 2.5 MG: 2.5 TABLET ORAL at 09:51

## 2017-06-13 RX ADMIN — POTASSIUM CHLORIDE 20 MEQ: 20 TABLET, EXTENDED RELEASE ORAL at 18:00

## 2017-06-13 RX ADMIN — INSULIN LISPRO 4 UNITS: 100 INJECTION, SOLUTION INTRAVENOUS; SUBCUTANEOUS at 22:57

## 2017-06-13 RX ADMIN — GABAPENTIN 600 MG: 300 CAPSULE ORAL at 09:50

## 2017-06-13 RX ADMIN — CEFTRIAXONE 2 G: 2 INJECTION, POWDER, FOR SOLUTION INTRAMUSCULAR; INTRAVENOUS at 00:42

## 2017-06-13 RX ADMIN — STANDARDIZED SENNA CONCENTRATE AND DOCUSATE SODIUM 2 TABLET: 8.6; 5 TABLET, FILM COATED ORAL at 18:27

## 2017-06-13 RX ADMIN — FERROUS SULFATE TAB 325 MG (65 MG ELEMENTAL FE) 325 MG: 325 (65 FE) TAB at 09:51

## 2017-06-13 RX ADMIN — FAMOTIDINE 20 MG: 20 TABLET ORAL at 21:23

## 2017-06-13 RX ADMIN — GABAPENTIN 600 MG: 300 CAPSULE ORAL at 18:27

## 2017-06-13 RX ADMIN — MIDODRINE HYDROCHLORIDE 2.5 MG: 2.5 TABLET ORAL at 18:28

## 2017-06-13 RX ADMIN — FOLIC ACID 1 MG: 1 TABLET ORAL at 09:51

## 2017-06-13 RX ADMIN — Medication 20 ML: at 18:35

## 2017-06-13 RX ADMIN — VITAMIN D, TAB 1000IU (100/BT) 2000 UNITS: 25 TAB at 09:50

## 2017-06-13 RX ADMIN — FERROUS SULFATE TAB 325 MG (65 MG ELEMENTAL FE) 325 MG: 325 (65 FE) TAB at 18:28

## 2017-06-13 RX ADMIN — ACETAMINOPHEN 650 MG: 325 TABLET ORAL at 17:19

## 2017-06-13 RX ADMIN — DIPHENHYDRAMINE HYDROCHLORIDE 25 MG: 25 CAPSULE ORAL at 18:28

## 2017-06-13 RX ADMIN — Medication 400 MG: at 18:28

## 2017-06-13 RX ADMIN — ACETAMINOPHEN 650 MG: 325 TABLET ORAL at 09:51

## 2017-06-13 RX ADMIN — INSULIN LISPRO 2 UNITS: 100 INJECTION, SOLUTION INTRAVENOUS; SUBCUTANEOUS at 18:31

## 2017-06-13 NOTE — ROUTINE PROCESS
Bedside shift change report given to Avtar Rasheed RN (oncoming nurse) by Mikey Lima (offgoing nurse). Report included the following information SBAR, Kardex and MAR.

## 2017-06-13 NOTE — PROCEDURES
DATE: 6/13/17    PROCEDURE(S):  1. Peripherally inserted central venous catheter (PICC) insertion using sonographic and fluoroscopic guidance    INDICATION: 14-year-old female. History of difficult intravenous access and/or long-term intravenous therapy needed. PICC insertion requested. TECHNIQUE: Expected benefits, potential risks, and alternatives to the procedure were discussed with the patient (and/or surrogate decision maker, as applicable) and all questions were answered. Discussed risks include - but are not limited to - bleeding, infection, vascular injury, arrhythmia, thromboembolic events, device dysfunction, and medication reaction. Informed consent was obtained. The patient was placed on the procedure table, the upper extremity was prepared in usual sterile fashion, and the ensuing procedure was performed with full barrier precaution, including caps, masks, gowns, gloves, and drapes. Procedure verification was completed. Upon physician review, a patent vessel was not able to be identified. Consequently, ultrasound evaluation of potential access sites was performed. After successfully identifying a patent vessel, intravascular access was achieved under real-time ultrasound guidance. The needle was visualized entering the vessel. An image was permanently recorded and archived in PACS. An 018 guidewire was introduced through the needle and advanced centrally under fluoroscopic guidance. A dermal incision was made, the needle was removed, and a peel-away sheath was placed. The catheter was trimmed to the desired length and inserted through the peel-away sheath, which was removed as the catheter was advanced to the cavoatrial junction. Blood freely aspirated and the catheter readily flushed. The catheter was flushed, capped, secured in place, and sterilely dressed. An image was obtained to document final position. The patient tolerated the procedure well.     ACCESS: Right basilic vein    CONTRAST: 10 mL    FLUOROSCOPY: 7.5 minutes; 27 images    MEDICATION(S): Local lidocaine    SEDATION TIME: None    COMPLICATION(S): None    ESTIMATED BLOOD LOSS: Minimal    BLOOD ADMINISTERED: None    SPECIMEN: None    IMPLANT(S): None    DRAIN(S): None    : Mela James MD    ASSISTANT(S): None    FINDINGS:  1. 5-Dominican double lumen PICC trimmed to 43 cm terminates at cavoatrial junction    IMPRESSION:  Technically successful insertion of PICC, which is ready for immediate use, including CT power injection.

## 2017-06-13 NOTE — PROGRESS NOTES
Anusha Goldman M.D. PROGRESS NOTE    Name: Harish Hill MRN: [id number]   : 1950 Hospital: [address] [place] [address] Dr   Date: 2017  Admission Date: 2017     Subjective/Objective/Plans  1. Suspected psoas muscle infection on Rocephin  2. Chronic diastolic CHF, edema. Improved with Lasix  3. Paraplegia, some improvement,  4. muscle spasm, on Flexeril  5. Hypokalemia  6. Hypomagnesemia  7. IDDM  8. HLD  9. Gout  10. Hx of Orthostasis, off Midodrine, stable BP    Feeling better  VSS  K+ 3.9  Less edema  BNP better  Lungs clear    Plan  PT/OT  Anibiotics per ID  DC soon      Vital Signs:  Visit Vitals    /67 (BP 1 Location: Left arm, BP Patient Position: At rest)    Pulse 79    Temp 98.7 °F (37.1 °C)    Resp 18    Wt 100.4 kg (221 lb 5.5 oz)  Comment: (bed weight not working. stimated to last recorded [id number])    SpO2 100%    Breastfeeding No    BMI 34.67 kg/m2       O2 Device: Room air       Temp (24hrs), Av.5 °F (36.9 °C), Min:97 °F (36.1 °C), Max:99.2 °F (37.3 °C)     6:50 AM  Intake/Output:   Last shift:      1901 -  0700  In: 200 [I.V.:200]  Out: 950 [Urine:950]  Last 3 shifts:  07 -  190  In: 1935 [P.O.:1935]  Out: 4950 [Urine:4950]    Intake/Output Summary (Last 24 hours) at 17 0650  Last data filed at 17 0300   Gross per 24 hour   Intake             1180 ml   Output             2600 ml   Net            -1420 ml         Physical Exam:    General: in no apparent distress    HEENT: pupils equal, no ear discharge   Neck: No abnormally enlarged lymph nodes. , no JDV   Mouth: MMM no lesions    Chest: normal, no breast masses   Lungs: normal air entry   Heart: Regular rate and rhythm   Abdomen: abdomen is soft without significant tenderness, masses, organomegaly or guarding   Extremity: Trace edema   Neuro: alert    Skin: Skin color, texture, turgor normal. No rashes or lesions    Data Review:    Labs: Results:       Chemistry No results for input(s): GLU, NA, K, CL, CO2, BUN, CREA, CA, AGAP, BUCR, TBILI, GPT, AP, TP, ALB, GLOB, AGRAT in the last 72 hours. CBC w/Diff No results for input(s): WBC, RBC, HGB, HCT, PLT, GRANS, LYMPH, EOS, HGBEXT, HCTEXT, PLTEXT in the last 72 hours. Cardiac Enzymes No results for input(s): CPK, CKND1, CARLI in the last 72 hours. No lab exists for component: CKRMB, TROIP   Coagulation No results for input(s): PTP, INR, APTT in the last 72 hours. No lab exists for component: INREXT    Lipid Panel Lab Results   Component Value Date/Time    Cholesterol, total 154 07/24/2012 01:00 AM    HDL Cholesterol 48 07/24/2012 01:00 AM    LDL, calculated 90.4 07/24/2012 01:00 AM    VLDL, calculated 15.6 07/24/2012 01:00 AM    Triglyceride 78 07/24/2012 01:00 AM    CHOL/HDL Ratio 3.2 07/24/2012 01:00 AM      BNP No results for input(s): BNPP in the last 72 hours. Liver Enzymes No results for input(s): TP, ALB, TBILI, AP, SGOT, ALT, CBIL in the last 72 hours. Thyroid Studies Lab Results   Component Value Date/Time    TSH 0.51 04/20/2017 07:26 PM          Procedures/imaging: see electronic medical records for all procedures, Xrays and details which were not copied into this note but were reviewed. Allergies:   Allergies   Allergen Reactions    Vancomycin Itching    Ampicillin Itching    Bactrim [Sulfamethoxazole-Trimethoprim] Unknown (comments)    Blueberry Swelling     Causes throat swelling    Ciprofloxacin Itching    Codeine Other (comments)     Jumpy feeling    Crestor [Rosuvastatin] Itching    Darvocet A500 [Propoxyphene N-Acetaminophen] Itching    Demerol [Meperidine] Itching    Levaquin [Levofloxacin] Itching    Lipitor [Atorvastatin] Myalgia    Magnesium Oxide Itching     nausea    Minocin [Minocycline] Unknown (comments)    Pcn [Penicillins] Itching    Pravachol [Pravastatin] Swelling     Swelling in mouth     Shellfish Derived Unknown (comments)    Sulfa (Sulfonamide Antibiotics) Itching    Ultracet [Tramadol-Acetaminophen] Itching    Vicodin [Hydrocodone-Acetaminophen] Unknown (comments)    Vytorin 10-10 [Ezetimibe-Simvastatin] Myalgia    Percodan [Oxycodone Hcl-Oxycodone-Asa] Itching       Home Medications:  Prior to Admission Medications   Prescriptions Last Dose Informant Patient Reported? Taking? Lactobacillus Acidoph & Bulgar (FLORANEX) 1 million cell tab tablet   No No   Sig: Take 1 Tab by mouth two (2) times a day for 21 days. acetaminophen (TYLENOL) 325 mg tablet   No No   Sig: Take 2 Tabs by mouth every four (4) hours as needed (for fever or pain level less than 5/10). Indications: Fever, Pain   albuterol-ipratropium (DUO-NEB) 2.5 mg-0.5 mg/3 ml nebu   No No   Sig: 3 mL by Nebulization route four (4) times daily. allopurinol (ZYLOPRIM) 100 mg tablet   No No   Sig: Take 0.5 Tabs by mouth daily. Indications: HYPERURICEMIA   aluminum-magnesium hydroxide (MAALOX) 200-200 mg/5 mL suspension   No No   Sig: Take 15 mL by mouth four (4) times daily as needed for Indigestion. cefTRIAXone 2 gram 2 g IVPB   No No   Si g by IntraVENous route every twenty-four (24) hours for 20 days. cholecalciferol (VITAMIN D3) 1,000 unit tablet   No No   Sig: Take 2 Tabs by mouth daily. Indications: PREVENTION OF VITAMIN D DEFICIENCY   famotidine (PEPCID) 20 mg tablet   No No   Sig: Take 1 Tab by mouth nightly. ferrous sulfate 325 mg (65 mg iron) tablet   No No   Sig: Take 1 Tab by mouth two (2) times daily (with meals) for 14 days. folic acid (FOLVITE) 1 mg tablet   No No   Sig: Take 1 Tab by mouth daily.    insulin aspart (NOVOLOG) 100 unit/mL injection   No No   Sig: INITIATE INSULIN CORRECTIVE PROTOCOL (HR): Normal Insulin Sensitivity  For Blood Sugar (mg/dL) of:    Less than 150 =   0 units          150 -199 =   2 units 200 -249 =   4 units 250 -299 =   6 units 300 -349 =   8 units 350 and above =   10 units If 2 glucose readings are above 200 mg/dL   insulin glargine (LANTUS) 100 unit/mL injection   No No   Si Units by SubCUTAneous route nightly. Indications: type 2 diabetes mellitus   midodrine (PROAMITINE) 2.5 mg tablet   No No   Si mg [2 tablets] po three time a day for 5 days then 2.5 mg po three time a day x 5 days then stop   ondansetron (ZOFRAN ODT) 4 mg disintegrating tablet   Yes No   Sig: Take 4 mg by mouth every eight (8) hours as needed for Nausea. oxyCODONE-acetaminophen (PERCOCET 7.5) 7.5-325 mg per tablet   No No   Sig: Take 1 Tab by mouth every six (6) hours as needed (for pain level greater than 5/10). Max Daily Amount: 4 Tabs. Indications: Pain   polyethylene glycol (MIRALAX) 17 gram packet   No No   Sig: Take 1 Packet by mouth two (2) times a day. pravastatin (PRAVACHOL) 40 mg tablet   Yes No   Sig: Take 40 mg by mouth nightly. Indications: DYSLIPIDEMIA   senna-docusate (PERICOLACE) 8.6-50 mg per tablet   No No   Sig: Take 2 Tabs by mouth daily (after dinner).  Indications: Constipation      Facility-Administered Medications: None       Current Medications:  Current Facility-Administered Medications   Medication Dose Route Frequency    cyclobenzaprine (FLEXERIL) tablet 10 mg  10 mg Oral TID PRN    furosemide (LASIX) injection 40 mg  40 mg IntraVENous DAILY    gabapentin (NEURONTIN) capsule 600 mg  600 mg Oral BID    potassium chloride (K-DUR, KLOR-CON) SR tablet 20 mEq  20 mEq Oral BID    ondansetron (ZOFRAN ODT) tablet 4 mg  4 mg Oral Q8H PRN    acetaminophen (TYLENOL) tablet 650 mg  650 mg Oral Q4H PRN    senna-docusate (PERICOLACE) 8.6-50 mg per tablet 2 Tab  2 Tab Oral PCD    cholecalciferol (VITAMIN D3) tablet 2,000 Units  2,000 Units Oral DAILY    oxyCODONE-acetaminophen (PERCOCET 7.5) 7.5-325 mg per tablet 1 Tab  1 Tab Oral Q6H PRN    allopurinol (ZYLOPRIM) tablet 50 mg  50 mg Oral DAILY    midodrine (PROAMITINE) tablet 2.5 mg  2.5 mg Oral TID WITH MEALS    Lactobacillus Acidoph & Bulgar (FLORANEX) tablet 1 Tab  1 Tab Oral BID    folic acid (FOLVITE) tablet 1 mg  1 mg Oral DAILY    insulin glargine (LANTUS) injection 5 Units  5 Units SubCUTAneous QHS    ferrous sulfate tablet 325 mg  325 mg Oral BID WITH MEALS    famotidine (PEPCID) tablet 20 mg  20 mg Oral QHS    polyethylene glycol (MIRALAX) packet 17 g  17 g Oral BID    cefTRIAXone (ROCEPHIN) 2 g in 0.9% sodium chloride (MBP/ADV) 50 mL MBP  2 g IntraVENous Q24H    aluminum-magnesium hydroxide (MAALOX) oral suspension 15 mL  15 mL Oral QID PRN    sodium chloride (NS) flush 5-10 mL  5-10 mL IntraVENous Q8H    sodium chloride (NS) flush 5-10 mL  5-10 mL IntraVENous PRN    albuterol-ipratropium (DUO-NEB) 2.5 MG-0.5 MG/3 ML  3 mL Nebulization Q4H PRN    magnesium oxide (MAG-OX) tablet 400 mg  400 mg Oral BID    diphenhydrAMINE (BENADRYL) capsule 25 mg  25 mg Oral BID    insulin lispro (HUMALOG) injection   SubCUTAneous AC&HS    glucose chewable tablet 16 g  16 g Oral PRN    glucagon (GLUCAGEN) injection 1 mg  1 mg IntraMUSCular PRN    dextrose (D50W) injection syrg 12.5-25 g  25-50 mL IntraVENous PRN    0.9% sodium chloride infusion 250 mL  250 mL IntraVENous PRN       Chart and notes reviewed. Data reviewed. I have evaluated and examined the patient. IMPRESSION:   Patient Active Problem List   Diagnosis Code    Nonischemic cardiomyopathy ([facility] I42.8    History of complete heart block Z86.79    Biventricular implantable cardioverter-defibrillator in situ Z95.810    Left bundle branch block (LBBB) on electrocardiogram I44.7    Type 2 diabetes mellitus with diabetic neuropathy (HCC) E11.40    Dyslipidemia E78.5    Diabetic neuropathy associated with type 2 diabetes mellitus ([facility] E11.40    Obstructive sleep apnea on CPAP G47.33, Z99.89    AICD generator infection ([facility] T82. 7XXA    Difficult airway for intubation T88. 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 1XXA    Impaired mobility and ADLs Z74.09    History of Coumadin therapy Z79.01    Gout M10.9    Acute paraplegia ([facility]) G82.20    Sepsis ([address] A41.9    Psoas abscess, right ([facility] K68.12    Krueger catheter in place on admission Z96.0    Urinary tract infection due to Enterococcus N39.0, B95.2    Group B streptococcal infection A49.1    Hypervolemia E87.70    Anemia D64.9 ·         PLAN:/DISCUSSION:   · Discussed with CARINE Salazar MD  6/13/2017, 6:50 AM

## 2017-06-13 NOTE — PROGRESS NOTES
Infectious Disease progress Note    Requested by: Dr. Matthew Salomon    Reason: sepsis    Current abx Prior abx   Ceftriaxone 4/24-5/15; 5/22- till date Fluconazole 5/16-5/30  Meropenem 5/15-5/22  Cefepime 4/20-4/21     Lines:       Assessment :      77 y.o., right handed female, with an established history of hypertension, complete heart block with permanent pacemaker placed, diabetes mellitus, diabetic peripheral neuropathy, obesity, admitted to [facility] on 6/6/2017.      Recent hospitalization (4/20/17-5/15/17) for Sepsis  secondary to group B streptococcus bloodstream infection (positive blood cultures 4/20, negative blood cultures 4/21). Most likely source of bloodstream infection is infected right psoas hematoma/abscess. 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       Enterococcus in urine cultures 4/20 likely colonizer    bilateral LE dvt - s/p IVC filter placement 5/11    Now with low grade fevers, abdominal pain, acute anemia. Highly complex clinical picture. Difficult to determine etiology of patients' symptoms. Differential diagnosis: undiagnosed GI bleed/anemia of chronic disease from spinal osteomyelitis   Persistent upper abdominal discomfort concerning for undiagnosed gastritis/peptic ulcer disease. Will f/u bone scan to rule out spinal infection/radiculopathy as the cause of upper abdominal pain    Abdominal pain and pain radiating down to right leg is likely radiculopathy/neuropathy - significant clinical improvement after initiation of neurontin    Constipation as seen on ct scan may be contributing to abdominal pain. 6x1 cm residual collection right psoas is likely serous fluid - most recent drains only drained serous material. Lack of fevers/increasing wbc/worsening right flank tenderness argues against worsening infection.  Hence, will monitor clinically      patient doesn't have erythema at the site of pacemaker. No fluid collection noted at pacemaker pocket site per CT scan 6/7/17 - discussed with radiologist. At this time risk of removal of pacemaker/general pocket change exceed the benefit.        No evidence of pneumonia. No significant pyuria.      No leukocytosis        Recommendations:     1. continue ceftriaxone  2. F/u bone scan to evaluate for chronic thoracolumbar spine osteomyelitis as the cause of anemia  3. IR evaluation to determine need for aspiration of right psoas collection (discussed with dr. Maxi Traylor)  3. Recommend EGD if recurrent drop in h/h       Above plan was discussed in details with patient, dr. Ting Baltazar. Please call me if any further questions or concerns. Will continue to participate in the care of this patient. subjective:    Feels better . Still with upper abdominal pain. improved right leg pain/flank pain. No fever, chills, diarrhea. Patient denies headaches, visual disturbances, sore throat, runny nose, earaches, cp, sob, chills, cough, diarrhea, burning micturition, increased pain or weakness in extremities. Home Medication List    Details   oxyCODONE-acetaminophen (PERCOCET 7.5) 7.5-325 mg per tablet Take 1 Tab by mouth every six (6) hours as needed (for pain level greater than 5/10). Max Daily Amount: 4 Tabs. Indications: Pain  Qty: 20 Tab, Refills: 0      albuterol-ipratropium (DUO-NEB) 2.5 mg-0.5 mg/3 ml nebu 3 mL by Nebulization route four (4) times daily. Qty: 30 Nebule, Refills: 0      allopurinol (ZYLOPRIM) 100 mg tablet Take 0.5 Tabs by mouth daily. Indications: HYPERURICEMIA  Qty: 30 Tab, Refills: 0      cefTRIAXone 2 gram 2 g IVPB 2 g by IntraVENous route every twenty-four (24) hours for 20 days.   Qty: 20 Dose, Refills: 0      midodrine (PROAMITINE) 2.5 mg tablet 5 mg [2 tablets] po three time a day for 5 days then 2.5 mg po three time a day x 5 days then stop  Qty: 45 Tab, Refills: 0 Lactobacillus Acidoph & Bulgar (FLORANEX) 1 million cell tab tablet Take 1 Tab by mouth two (2) times a day for 21 days. Qty: 42 Tab, Refills: 0      aluminum-magnesium hydroxide (MAALOX) 200-200 mg/5 mL suspension Take 15 mL by mouth four (4) times daily as needed for Indigestion. Qty: 100 mL, Refills: 0      folic acid (FOLVITE) 1 mg tablet Take 1 Tab by mouth daily. Qty: 30 Tab, Refills: 0      insulin glargine (LANTUS) 100 unit/mL injection 5 Units by SubCUTAneous route nightly. Indications: type 2 diabetes mellitus  Qty: 1 Vial, Refills: 0    Associated Diagnoses: Type 2 diabetes mellitus with diabetic neuropathy, with long-term current use of insulin (HCC)      ferrous sulfate 325 mg (65 mg iron) tablet Take 1 Tab by mouth two (2) times daily (with meals) for 14 days. Qty: 28 Tab, Refills: 0      famotidine (PEPCID) 20 mg tablet Take 1 Tab by mouth nightly. Qty: 30 Tab, Refills: 0      polyethylene glycol (MIRALAX) 17 gram packet Take 1 Packet by mouth two (2) times a day. Qty: 30 Packet, Refills: 0      cholecalciferol (VITAMIN D3) 1,000 unit tablet Take 2 Tabs by mouth daily. Indications: PREVENTION OF VITAMIN D DEFICIENCY  Qty: 30 Tab, Refills: 0      ondansetron (ZOFRAN ODT) 4 mg disintegrating tablet Take 4 mg by mouth every eight (8) hours as needed for Nausea. acetaminophen (TYLENOL) 325 mg tablet Take 2 Tabs by mouth every four (4) hours as needed (for fever or pain level less than 5/10). Indications: Fever, Pain  Qty: 30 Tab, Refills: 0    Associated Diagnoses: Iliopsoas muscle hematoma, right, subsequent encounter      senna-docusate (PERICOLACE) 8.6-50 mg per tablet Take 2 Tabs by mouth daily (after dinner). Indications: Constipation  Qty: 30 Tab, Refills: 0      pravastatin (PRAVACHOL) 40 mg tablet Take 40 mg by mouth nightly.  Indications: DYSLIPIDEMIA      insulin aspart (NOVOLOG) 100 unit/mL injection INITIATE INSULIN CORRECTIVE PROTOCOL (HR): Normal Insulin Sensitivity  For Blood Sugar (mg/dL) of:    Less than 150 =   0 units          150 -199 =   2 units 200 -249 =   4 units 250 -299 =   6 units 300 -349 =   8 units 350 and above =   10 units If 2 glucose readings are above 200 mg/dL  Qty: 10 mL, Refills: 6             Current Facility-Administered Medications   Medication Dose Route Frequency    cyclobenzaprine (FLEXERIL) tablet 10 mg  10 mg Oral TID PRN    furosemide (LASIX) injection 40 mg  40 mg IntraVENous DAILY    gabapentin (NEURONTIN) capsule 600 mg  600 mg Oral BID    potassium chloride (K-DUR, KLOR-CON) SR tablet 20 mEq  20 mEq Oral BID    ondansetron (ZOFRAN ODT) tablet 4 mg  4 mg Oral Q8H PRN    acetaminophen (TYLENOL) tablet 650 mg  650 mg Oral Q4H PRN    senna-docusate (PERICOLACE) 8.6-50 mg per tablet 2 Tab  2 Tab Oral PCD    cholecalciferol (VITAMIN D3) tablet 2,000 Units  2,000 Units Oral DAILY    oxyCODONE-acetaminophen (PERCOCET 7.5) 7.5-325 mg per tablet 1 Tab  1 Tab Oral Q6H PRN    allopurinol (ZYLOPRIM) tablet 50 mg  50 mg Oral DAILY    midodrine (PROAMITINE) tablet 2.5 mg  2.5 mg Oral TID WITH MEALS    Lactobacillus Acidoph & Bulgar (FLORANEX) tablet 1 Tab  1 Tab Oral BID    folic acid (FOLVITE) tablet 1 mg  1 mg Oral DAILY    insulin glargine (LANTUS) injection 5 Units  5 Units SubCUTAneous QHS    ferrous sulfate tablet 325 mg  325 mg Oral BID WITH MEALS    famotidine (PEPCID) tablet 20 mg  20 mg Oral QHS    polyethylene glycol (MIRALAX) packet 17 g  17 g Oral BID    cefTRIAXone (ROCEPHIN) 2 g in 0.9% sodium chloride (MBP/ADV) 50 mL MBP  2 g IntraVENous Q24H    aluminum-magnesium hydroxide (MAALOX) oral suspension 15 mL  15 mL Oral QID PRN    sodium chloride (NS) flush 5-10 mL  5-10 mL IntraVENous Q8H    sodium chloride (NS) flush 5-10 mL  5-10 mL IntraVENous PRN    albuterol-ipratropium (DUO-NEB) 2.5 MG-0.5 MG/3 ML  3 mL Nebulization Q4H PRN    magnesium oxide (MAG-OX) tablet 400 mg  400 mg Oral BID    diphenhydrAMINE (BENADRYL) capsule 25 mg 25 mg Oral BID    insulin lispro (HUMALOG) injection   SubCUTAneous AC&HS    glucose chewable tablet 16 g  16 g Oral PRN    glucagon (GLUCAGEN) injection 1 mg  1 mg IntraMUSCular PRN    dextrose (D50W) injection syrg 12.5-25 g  25-50 mL IntraVENous PRN    0.9% sodium chloride infusion 250 mL  250 mL IntraVENous PRN       Allergies: Vancomycin; Ampicillin; Bactrim [sulfamethoxazole-trimethoprim]; Blueberry; Ciprofloxacin; Codeine; Crestor [rosuvastatin]; Darvocet a500 [propoxyphene n-acetaminophen]; Demerol [meperidine]; Levaquin [levofloxacin]; Lipitor [atorvastatin]; Magnesium oxide; Minocin [minocycline]; Pcn [penicillins]; Pravachol [pravastatin]; Shellfish derived; Sulfa (sulfonamide antibiotics); Ultracet [tramadol-acetaminophen]; Vicodin [hydrocodone-acetaminophen]; Vytorin 10-10 [ezetimibe-simvastatin]; and Percodan [oxycodone hcl-oxycodone-asa]    ROS: 12 point ROS obtained in details. Pertinent positives as mentioned in HPI,   otherwise negative    Physical Exam:    General: Well developed, well nourished female laying on the bed, AAOx3 NAD     General:  awake alert and oriented   HEENT:  Normocephalic, atraumatic, PERRL, EOMI, no scleral icterus or pallor; no conjunctival hemmohage; nasal and oral mucous are moist and without evidence of lesions. Neck supple, no bruits. Lymph Nodes:  no cervical, axillary or inguinal adenopathy   Lungs:  non-labored, bilaterally clear to auscultation- no crackles wheezes rales or rhonchi   Heart:  s1 and s2 irregular; no rubs or gallops, no edema, + pedal pulses   Abdomen: soft, non-distended, active bowel sounds, no hepatomegaly, no splenomegaly. no tenderness over the left abdominal pacemaker, no overlying erythema or fluctuance, present epigastric/RUQ tenderness   Genitourinary:  Krueger in place   Extremities:  no clubbing, cyanosis; no joint effusions or swelling; muscle mass appropriate for age   Neurologic:  No gross focal sensory abnormalities; 5/5 muscle strength to upper extremities. 0/5 strength in lower extremities. Cranial nerves intact   Skin:  Surgical scars abdomen, left neck well healed   Back: no paraspinal muscle guarding or rigidity, resolved right CVA tenderness, tenderness over lumbar spine around L4-L5   Psychiatric:  No suicidal or homicidal ideations, appropriate mood and affect          Labs: Results:   Chemistry No results for input(s): GLU, NA, K, CL, CO2, BUN, CREA, CA, AGAP, BUCR, TBIL, GPT, AP, TP, ALB, GLOB, AGRAT in the last 72 hours. CBC w/Diff No results for input(s): WBC, RBC, HGB, HCT, PLT, GRANS, LYMPH, EOS, HGBEXT, HCTEXT, PLTEXT, HGBEXT, HCTEXT, PLTEXT in the last 72 hours. Microbiology No results for input(s): CULT in the last 72 hours.        RADIOLOGY:    All available imaging studies/reports in [facility] for this admission were reviewed    Dr. Taran Griffin, Infectious Disease Specialist  [phone]  June 13, 2017  8:43 AM

## 2017-06-14 ENCOUNTER — APPOINTMENT (OUTPATIENT)
Dept: NUCLEAR MEDICINE | Age: 67
DRG: 372 | End: 2017-06-14
Attending: INTERNAL MEDICINE
Payer: COMMERCIAL

## 2017-06-14 LAB
GLUCOSE BLD STRIP.AUTO-MCNC: 170 MG/DL (ref 70–110)
GLUCOSE BLD STRIP.AUTO-MCNC: 175 MG/DL (ref 70–110)
GLUCOSE BLD STRIP.AUTO-MCNC: 185 MG/DL (ref 70–110)
GLUCOSE BLD STRIP.AUTO-MCNC: 275 MG/DL (ref 70–110)

## 2017-06-14 PROCEDURE — 74011250637 HC RX REV CODE- 250/637: Performed by: FAMILY MEDICINE

## 2017-06-14 PROCEDURE — 65660000000 HC RM CCU STEPDOWN

## 2017-06-14 PROCEDURE — 74011636637 HC RX REV CODE- 636/637: Performed by: FAMILY MEDICINE

## 2017-06-14 PROCEDURE — 74011250636 HC RX REV CODE- 250/636: Performed by: FAMILY MEDICINE

## 2017-06-14 PROCEDURE — 82962 GLUCOSE BLOOD TEST: CPT

## 2017-06-14 PROCEDURE — 74011000258 HC RX REV CODE- 258: Performed by: FAMILY MEDICINE

## 2017-06-14 PROCEDURE — A9556 GA67 GALLIUM: HCPCS

## 2017-06-14 PROCEDURE — 74011250637 HC RX REV CODE- 250/637: Performed by: INTERNAL MEDICINE

## 2017-06-14 RX ORDER — GABAPENTIN 300 MG/1
600 CAPSULE ORAL 2 TIMES DAILY
Status: DISCONTINUED | OUTPATIENT
Start: 2017-06-14 | End: 2017-06-17

## 2017-06-14 RX ADMIN — FERROUS SULFATE TAB 325 MG (65 MG ELEMENTAL FE) 325 MG: 325 (65 FE) TAB at 17:14

## 2017-06-14 RX ADMIN — INSULIN LISPRO 6 UNITS: 100 INJECTION, SOLUTION INTRAVENOUS; SUBCUTANEOUS at 12:46

## 2017-06-14 RX ADMIN — GABAPENTIN 600 MG: 300 CAPSULE ORAL at 08:33

## 2017-06-14 RX ADMIN — FUROSEMIDE 40 MG: 10 INJECTION, SOLUTION INTRAMUSCULAR; INTRAVENOUS at 08:35

## 2017-06-14 RX ADMIN — GABAPENTIN 600 MG: 300 CAPSULE ORAL at 17:59

## 2017-06-14 RX ADMIN — INSULIN LISPRO 2 UNITS: 100 INJECTION, SOLUTION INTRAVENOUS; SUBCUTANEOUS at 17:59

## 2017-06-14 RX ADMIN — POTASSIUM CHLORIDE 20 MEQ: 20 TABLET, EXTENDED RELEASE ORAL at 08:34

## 2017-06-14 RX ADMIN — FERROUS SULFATE TAB 325 MG (65 MG ELEMENTAL FE) 325 MG: 325 (65 FE) TAB at 08:32

## 2017-06-14 RX ADMIN — Medication 10 ML: at 17:17

## 2017-06-14 RX ADMIN — CEFTRIAXONE 2 G: 2 INJECTION, POWDER, FOR SOLUTION INTRAMUSCULAR; INTRAVENOUS at 01:21

## 2017-06-14 RX ADMIN — DIPHENHYDRAMINE HYDROCHLORIDE 25 MG: 25 CAPSULE ORAL at 08:34

## 2017-06-14 RX ADMIN — Medication 10 ML: at 22:17

## 2017-06-14 RX ADMIN — INSULIN GLARGINE 5 UNITS: 100 INJECTION, SOLUTION SUBCUTANEOUS at 22:07

## 2017-06-14 RX ADMIN — MIDODRINE HYDROCHLORIDE 2.5 MG: 2.5 TABLET ORAL at 17:15

## 2017-06-14 RX ADMIN — POTASSIUM CHLORIDE 20 MEQ: 20 TABLET, EXTENDED RELEASE ORAL at 17:16

## 2017-06-14 RX ADMIN — FAMOTIDINE 20 MG: 20 TABLET ORAL at 22:11

## 2017-06-14 RX ADMIN — DIPHENHYDRAMINE HYDROCHLORIDE 25 MG: 25 CAPSULE ORAL at 17:14

## 2017-06-14 RX ADMIN — LACTOBACILLUS TAB 1 TABLET: TAB at 17:15

## 2017-06-14 RX ADMIN — MIDODRINE HYDROCHLORIDE 2.5 MG: 2.5 TABLET ORAL at 08:34

## 2017-06-14 RX ADMIN — FOLIC ACID 1 MG: 1 TABLET ORAL at 08:33

## 2017-06-14 RX ADMIN — LACTOBACILLUS TAB 1 TABLET: TAB at 08:34

## 2017-06-14 RX ADMIN — MIDODRINE HYDROCHLORIDE 2.5 MG: 2.5 TABLET ORAL at 12:46

## 2017-06-14 RX ADMIN — INSULIN LISPRO 2 UNITS: 100 INJECTION, SOLUTION INTRAVENOUS; SUBCUTANEOUS at 22:08

## 2017-06-14 RX ADMIN — Medication 400 MG: at 17:15

## 2017-06-14 RX ADMIN — Medication 400 MG: at 08:34

## 2017-06-14 RX ADMIN — INSULIN LISPRO 2 UNITS: 100 INJECTION, SOLUTION INTRAVENOUS; SUBCUTANEOUS at 08:31

## 2017-06-14 RX ADMIN — ALLOPURINOL 50 MG: 100 TABLET ORAL at 08:33

## 2017-06-14 RX ADMIN — VITAMIN D, TAB 1000IU (100/BT) 2000 UNITS: 25 TAB at 08:32

## 2017-06-14 NOTE — PROGRESS NOTES
Infectious Disease progress Note    Requested by: Dr. Tina Whelan    Reason: sepsis    Current abx Prior abx   Ceftriaxone 4/24-5/15; 5/22- till date Fluconazole 5/16-5/30  Meropenem 5/15-5/22  Cefepime 4/20-4/21     Lines:       Assessment :      77 y.o., right handed female, with an established history of hypertension, complete heart block with permanent pacemaker placed, diabetes mellitus, diabetic peripheral neuropathy, obesity, admitted to [facility] on 6/6/2017.      Recent hospitalization (4/20/17-5/15/17) for Sepsis  secondary to group B streptococcus bloodstream infection (positive blood cultures 4/20, negative blood cultures 4/21). Most likely source of bloodstream infection is infected right psoas hematoma/abscess. 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       Enterococcus in urine cultures 4/20 likely colonizer    bilateral LE dvt - s/p IVC filter placement 5/11    Now with low grade fevers, abdominal pain, acute anemia. Highly complex clinical picture. Difficult to determine etiology of patients' symptoms. Differential diagnosis: undiagnosed GI bleed/anemia of chronic disease from spinal osteomyelitis   Persistent upper abdominal discomfort concerning for undiagnosed gastritis/peptic ulcer disease. Will f/u bone scan to rule out spinal infection/radiculopathy as the cause of upper abdominal pain    Abdominal pain and pain radiating down to right leg is likely radiculopathy/neuropathy - significant clinical improvement after initiation of neurontin    Constipation as seen on ct scan may be contributing to abdominal pain. 6x1 cm residual collection right psoas is likely serous fluid - most recent drains only drained serous material. Lack of fevers/increasing wbc/worsening right flank tenderness argues against worsening infection.    Ct findings discussed with  dariolov - no drainable fluid collection in psoas - significant improvement in psoas abnormalities compared to prior ct scan    patient doesn't have erythema at the site of pacemaker. No fluid collection noted at pacemaker pocket site per CT scan 6/7/17 - discussed with radiologist. At this time risk of removal of pacemaker/general pocket change exceed the benefit.        No evidence of pneumonia. No significant pyuria.      No leukocytosis        Recommendations:     1. continue ceftriaxone  2. F/u bone scan to evaluate for chronic thoracolumbar spine osteomyelitis as the cause of anemia  3. Recommend EGD if recurrent drop in h/h       Above plan was discussed in details with patient, dr. Adela Hoang. Please call me if any further questions or concerns. Will continue to participate in the care of this patient. subjective:    Feels better . Still with upper abdominal pain. improved right leg pain/flank pain. No fever, chills, diarrhea. Patient denies headaches, visual disturbances, sore throat, runny nose, earaches, cp, sob, chills, cough, diarrhea, burning micturition, increased pain or weakness in extremities. Home Medication List    Details   oxyCODONE-acetaminophen (PERCOCET 7.5) 7.5-325 mg per tablet Take 1 Tab by mouth every six (6) hours as needed (for pain level greater than 5/10). Max Daily Amount: 4 Tabs. Indications: Pain  Qty: 20 Tab, Refills: 0      albuterol-ipratropium (DUO-NEB) 2.5 mg-0.5 mg/3 ml nebu 3 mL by Nebulization route four (4) times daily. Qty: 30 Nebule, Refills: 0      allopurinol (ZYLOPRIM) 100 mg tablet Take 0.5 Tabs by mouth daily. Indications: HYPERURICEMIA  Qty: 30 Tab, Refills: 0      cefTRIAXone 2 gram 2 g IVPB 2 g by IntraVENous route every twenty-four (24) hours for 20 days.   Qty: 20 Dose, Refills: 0      midodrine (PROAMITINE) 2.5 mg tablet 5 mg [2 tablets] po three time a day for 5 days then 2.5 mg po three time a day x 5 days then stop  Qty: 45 Tab, Refills: 0 Lactobacillus Acidoph & Bulgar (FLORANEX) 1 million cell tab tablet Take 1 Tab by mouth two (2) times a day for 21 days. Qty: 42 Tab, Refills: 0      aluminum-magnesium hydroxide (MAALOX) 200-200 mg/5 mL suspension Take 15 mL by mouth four (4) times daily as needed for Indigestion. Qty: 100 mL, Refills: 0      folic acid (FOLVITE) 1 mg tablet Take 1 Tab by mouth daily. Qty: 30 Tab, Refills: 0      insulin glargine (LANTUS) 100 unit/mL injection 5 Units by SubCUTAneous route nightly. Indications: type 2 diabetes mellitus  Qty: 1 Vial, Refills: 0    Associated Diagnoses: Type 2 diabetes mellitus with diabetic neuropathy, with long-term current use of insulin (HCC)      ferrous sulfate 325 mg (65 mg iron) tablet Take 1 Tab by mouth two (2) times daily (with meals) for 14 days. Qty: 28 Tab, Refills: 0      famotidine (PEPCID) 20 mg tablet Take 1 Tab by mouth nightly. Qty: 30 Tab, Refills: 0      polyethylene glycol (MIRALAX) 17 gram packet Take 1 Packet by mouth two (2) times a day. Qty: 30 Packet, Refills: 0      cholecalciferol (VITAMIN D3) 1,000 unit tablet Take 2 Tabs by mouth daily. Indications: PREVENTION OF VITAMIN D DEFICIENCY  Qty: 30 Tab, Refills: 0      ondansetron (ZOFRAN ODT) 4 mg disintegrating tablet Take 4 mg by mouth every eight (8) hours as needed for Nausea. acetaminophen (TYLENOL) 325 mg tablet Take 2 Tabs by mouth every four (4) hours as needed (for fever or pain level less than 5/10). Indications: Fever, Pain  Qty: 30 Tab, Refills: 0    Associated Diagnoses: Iliopsoas muscle hematoma, right, subsequent encounter      senna-docusate (PERICOLACE) 8.6-50 mg per tablet Take 2 Tabs by mouth daily (after dinner). Indications: Constipation  Qty: 30 Tab, Refills: 0      pravastatin (PRAVACHOL) 40 mg tablet Take 40 mg by mouth nightly.  Indications: DYSLIPIDEMIA      insulin aspart (NOVOLOG) 100 unit/mL injection INITIATE INSULIN CORRECTIVE PROTOCOL (HR): Normal Insulin Sensitivity  For Blood Sugar (mg/dL) of:    Less than 150 =   0 units          150 -199 =   2 units 200 -249 =   4 units 250 -299 =   6 units 300 -349 =   8 units 350 and above =   10 units If 2 glucose readings are above 200 mg/dL  Qty: 10 mL, Refills: 6             Current Facility-Administered Medications   Medication Dose Route Frequency    sodium chloride (NS) flush 10-30 mL  10-30 mL InterCATHeter PRN    sodium chloride (NS) flush 10 mL  10 mL InterCATHeter Q24H    sodium chloride (NS) flush 10 mL  10 mL InterCATHeter PRN    sodium chloride (NS) flush 10-40 mL  10-40 mL InterCATHeter Q8H    sodium chloride (NS) flush 20 mL  20 mL InterCATHeter Q24H    alteplase (CATHFLO) 1 mg in sterile water (preservative free) 1 mL injection  1 mg InterCATHeter PRN    cyclobenzaprine (FLEXERIL) tablet 10 mg  10 mg Oral TID PRN    furosemide (LASIX) injection 40 mg  40 mg IntraVENous DAILY    gabapentin (NEURONTIN) capsule 600 mg  600 mg Oral BID    potassium chloride (K-DUR, KLOR-CON) SR tablet 20 mEq  20 mEq Oral BID    ondansetron (ZOFRAN ODT) tablet 4 mg  4 mg Oral Q8H PRN    acetaminophen (TYLENOL) tablet 650 mg  650 mg Oral Q4H PRN    senna-docusate (PERICOLACE) 8.6-50 mg per tablet 2 Tab  2 Tab Oral PCD    cholecalciferol (VITAMIN D3) tablet 2,000 Units  2,000 Units Oral DAILY    oxyCODONE-acetaminophen (PERCOCET 7.5) 7.5-325 mg per tablet 1 Tab  1 Tab Oral Q6H PRN    allopurinol (ZYLOPRIM) tablet 50 mg  50 mg Oral DAILY    midodrine (PROAMITINE) tablet 2.5 mg  2.5 mg Oral TID WITH MEALS    Lactobacillus Acidoph & Bulgar (FLORANEX) tablet 1 Tab  1 Tab Oral BID    folic acid (FOLVITE) tablet 1 mg  1 mg Oral DAILY    insulin glargine (LANTUS) injection 5 Units  5 Units SubCUTAneous QHS    ferrous sulfate tablet 325 mg  325 mg Oral BID WITH MEALS    famotidine (PEPCID) tablet 20 mg  20 mg Oral QHS    polyethylene glycol (MIRALAX) packet 17 g  17 g Oral BID    cefTRIAXone (ROCEPHIN) 2 g in 0.9% sodium chloride (MBP/ADV) 50 mL MBP  2 g IntraVENous Q24H    aluminum-magnesium hydroxide (MAALOX) oral suspension 15 mL  15 mL Oral QID PRN    sodium chloride (NS) flush 5-10 mL  5-10 mL IntraVENous Q8H    sodium chloride (NS) flush 5-10 mL  5-10 mL IntraVENous PRN    albuterol-ipratropium (DUO-NEB) 2.5 MG-0.5 MG/3 ML  3 mL Nebulization Q4H PRN    magnesium oxide (MAG-OX) tablet 400 mg  400 mg Oral BID    diphenhydrAMINE (BENADRYL) capsule 25 mg  25 mg Oral BID    insulin lispro (HUMALOG) injection   SubCUTAneous AC&HS    glucose chewable tablet 16 g  16 g Oral PRN    glucagon (GLUCAGEN) injection 1 mg  1 mg IntraMUSCular PRN    dextrose (D50W) injection syrg 12.5-25 g  25-50 mL IntraVENous PRN    0.9% sodium chloride infusion 250 mL  250 mL IntraVENous PRN       Allergies: Vancomycin; Ampicillin; Bactrim [sulfamethoxazole-trimethoprim]; Blueberry; Ciprofloxacin; Codeine; Crestor [rosuvastatin]; Darvocet a500 [propoxyphene n-acetaminophen]; Demerol [meperidine]; Levaquin [levofloxacin]; Lipitor [atorvastatin]; Magnesium oxide; Minocin [minocycline]; Pcn [penicillins]; Pravachol [pravastatin]; Shellfish derived; Sulfa (sulfonamide antibiotics); Ultracet [tramadol-acetaminophen]; Vicodin [hydrocodone-acetaminophen]; Vytorin 10-10 [ezetimibe-simvastatin]; and Percodan [oxycodone hcl-oxycodone-asa]    ROS: 12 point ROS obtained in details. Pertinent positives as mentioned in HPI,   otherwise negative    Physical Exam:    General: Well developed, well nourished female laying on the bed, AAOx3 NAD     General:  awake alert and oriented   HEENT:  Normocephalic, atraumatic, PERRL, EOMI, no scleral icterus or pallor; no conjunctival hemmohage; nasal and oral mucous are moist and without evidence of lesions. Neck supple, no bruits.    Lymph Nodes:  no cervical, axillary or inguinal adenopathy   Lungs:  non-labored, bilaterally clear to auscultation- no crackles wheezes rales or rhonchi   Heart:  s1 and s2 irregular; no rubs or gallops, no edema, + pedal pulses   Abdomen: soft, non-distended, active bowel sounds, no hepatomegaly, no splenomegaly. no tenderness over the left abdominal pacemaker, no overlying erythema or fluctuance, present epigastric/RUQ tenderness   Genitourinary: Krueger in place   Extremities:  no clubbing, cyanosis; no joint effusions or swelling; muscle mass appropriate for age   Neurologic:  No gross focal sensory abnormalities; 5/5 muscle strength to upper extremities. 0/5 strength in lower extremities. Cranial nerves intact   Skin:  Surgical scars abdomen, left neck well healed   Back: no paraspinal muscle guarding or rigidity, resolved right CVA tenderness, tenderness over lumbar spine around L4-L5   Psychiatric:  No suicidal or homicidal ideations, appropriate mood and affect          Labs: Results:   Chemistry No results for input(s): GLU, NA, K, CL, CO2, BUN, CREA, CA, AGAP, BUCR, TBIL, GPT, AP, TP, ALB, GLOB, AGRAT in the last 72 hours. CBC w/Diff No results for input(s): WBC, RBC, HGB, HCT, PLT, GRANS, LYMPH, EOS, HGBEXT, HCTEXT, PLTEXT, HGBEXT, HCTEXT, PLTEXT in the last 72 hours. Microbiology No results for input(s): CULT in the last 72 hours.        RADIOLOGY:    All available imaging studies/reports in [facility] for this admission were reviewed    Dr. Roxie Ngo, Infectious Disease Specialist  [phone]  June 14, 2017  8:43 AM

## 2017-06-14 NOTE — CONSULTS
Consult Note    Patient: Karma Dumont               Sex: female          DOA: 6/6/2017         YOB: 1950      Age:  77 y.o.        LOS:  LOS: 8 days              HPI:     Karma Dumont is a 77 y.o. female who has been seen for right psoas muscle abscess    CT reviewed showing less the 1 cm residual fluid collection. Significantly improved overall inflammation and decreased fluid collection are seen however in the interval.    Therefore, given size and overall improvement drainage is not recommended. D/w Dr. Mariposa Starks. Thank you. Past Medical History:   Diagnosis Date    Acute paraplegia ([facility] [address] 4/20/2017    Benign hypertensive heart disease with systolic CHF, NYHA class 2 ([address] 9/5/2012    Biventricular implantable cardioverter-defibrillator in situ 04/28/2005    Upgraded to BiV AICD; gen change 4/2008; pocket revision 10/2009; Abdominal - done on 8/22/2012 by Dr. Darwin Garcia Cardiac cath 08/15/1996    Patent coronaries. Elev LVEDP. EF 50-55%.  Cardiac echocardiogram 06/23/2015    Ltd study. EF 45-50%. Mild, diffuse hypk. Severe apical hypk. No mass or thrombus was clearly identified, although imaging was suboptimal.      Cardiac nuclear imaging test 06/19/2015    Fixed distal apical, distal septal defect more likely due to RV pacing than prior infarct. No ischemia. EF 46%. RWMA c/w RV pacing. Nondiagnostic EKG on pharm stress test.      Cardiovascular lower extremity venous duplex 09/04/2012    Acute, non-occlusive DVT in CFV on right. No DVT on left. No superficial thrombosis bilaterally.  Cardiovascular upper extremity venous duplex 08/27/2012    DVT in axillary vein on left. Left subclavian was not visualized.     Chronic anemia 9/5/2012    Chronic systolic heart failure (HCC)     Decreased calculated glomerular filtration rate (GFR) 3/30/2017    Calculated GFR equivalent to that of CKD stage 3 = 30-59 ml/min    Diabetic neuropathy associated with type 2 diabetes mellitus ([facility] [address] 6/28/2011    Difficult airway for intubation 08/22/2012    see anesthesia airway note    Dyslipidemia 6/28/2011    Gout     History of complete heart block 6/28/2011    History of Coumadin therapy     Anticoagulation for DVT of the LUE; Discontinued on 3/30/2017    History of deep venous thrombosis 9/5/2012    Left upper extremity    History of pyelonephritis 3/30/2017    Left bundle branch block (LBBB) on electrocardiogram 6/28/2011    Nonischemic cardiomyopathy ([facility] [address] 6/28/2011    Obesity (BMI 35.0-39.9 without comorbidity) ([address] 3/13/2017    Obstructive sleep apnea on CPAP 2/7/2012    Psoas abscess, right ([address] 4/20/2017    Psoas hematoma, right, secondary to anticoagulant therapy 3/30/2017    Type 2 diabetes mellitus with diabetic neuropathy ([address] 6/28/2011       Past Surgical History:   Procedure Laterality Date    HX CARPAL TUNNEL RELEASE  4/07    right     HX CHOLECYSTECTOMY  1994    HX HYSTERECTOMY  1973    HX OTHER SURGICAL  6/11/2012    AICD revision    HX PACEMAKER  4/28/2005    [employer] AICD       Family History   Problem Relation Age of Onset    Cancer Father      Leukemia       Social History     Social History    Marital status:      Spouse name: N/A    Number of children: N/A    Years of education: N/A     Social History Main Topics    Smoking status: Never Smoker    Smokeless tobacco: Never Used    Alcohol use No    Drug use: No    Sexual activity: Yes     Partners: Male     Other Topics Concern    Not on file     Social History Narrative       Prior to Admission medications    Medication Sig Start Date End Date Taking? Authorizing Provider   oxyCODONE-acetaminophen (PERCOCET 7.5) 7.5-325 mg per tablet Take 1 Tab by mouth every six (6) hours as needed (for pain level greater than 5/10). Max Daily Amount: 4 Tabs.  Indications: Pain 5/25/17   Savanna Palacios MD   albuterol-ipratropium (DUO-NEB) 2.5 mg-0.5 mg/3 ml nebu 3 mL by Nebulization route four (4) times daily. 5/25/17   Mariposa Ceron MD   allopurinol (ZYLOPRIM) 100 mg tablet Take 0.5 Tabs by mouth daily. Indications: HYPERURICEMIA 5/25/17   Mariposa Ceron MD   cefTRIAXone 2 gram 2 g IVPB 2 g by IntraVENous route every twenty-four (24) hours for 20 days. 5/25/17 6/14/17  Mariposa Ceron MD   midodrine (PROAMITINE) 2.5 mg tablet 5 mg [2 tablets] po three time a day for 5 days then 2.5 mg po three time a day x 5 days then stop 5/25/17   Mariposa Ceron MD   Lactobacillus Acidoph & Stephanie [facility]) 1 million cell tab tablet Take 1 Tab by mouth two (2) times a day for 21 days. 5/25/17 6/15/17  Felix Bates MD   aluminum-magnesium hydroxide (MAALOX) 200-200 mg/5 mL suspension Take 15 mL by mouth four (4) times daily as needed for Indigestion. 5/25/17   Mariposa Ceron MD   folic acid (FOLVITE) 1 mg tablet Take 1 Tab by mouth daily. 5/25/17   Mariposa Ceron MD   insulin glargine (LANTUS) 100 unit/mL injection 5 Units by SubCUTAneous route nightly. Indications: type 2 diabetes mellitus 5/25/17   Mariposa Ceron MD   famotidine (PEPCID) 20 mg tablet Take 1 Tab by mouth nightly. 5/25/17   Mariposa Ceron MD   polyethylene glycol (MIRALAX) 17 gram packet Take 1 Packet by mouth two (2) times a day. 5/25/17   Marpiosa Ceron MD   cholecalciferol (VITAMIN D3) 1,000 unit tablet Take 2 Tabs by mouth daily. Indications: PREVENTION OF VITAMIN D DEFICIENCY 4/19/17   Michael Holley MD   ondansetron [facility] ODT) 4 mg disintegrating tablet Take 4 mg by mouth every eight (8) hours as needed for Nausea. Historical Provider   acetaminophen (TYLENOL) 325 mg tablet Take 2 Tabs by mouth every four (4) hours as needed (for fever or pain level less than 5/10). Indications: Fever, Pain 4/18/17   Michael Holley MD   senna-docusate (PERICOLACE) 8.6-50 mg per tablet Take 2 Tabs by mouth daily (after dinner).  Indications: Constipation 4/18/17   Michael Holley MD   pravastatin (PRAVACHOL) 40 mg tablet Take 40 mg by mouth nightly. Indications: DYSLIPIDEMIA    Historical Provider   insulin aspart (NOVOLOG) 100 unit/mL injection INITIATE INSULIN CORRECTIVE PROTOCOL (HR): Normal Insulin Sensitivity  For Blood Sugar (mg/dL) of:    Less than 150 =   0 units          150 -199 =   2 units 200 -249 =   4 units 250 -299 =   6 units 300 -349 =   8 units 350 and above =   10 units If 2 glucose readings are above 200 mg/dL 9/5/12   LUDMILA Osuna       Allergies   Allergen Reactions    Vancomycin Itching    Ampicillin Itching    Bactrim [Sulfamethoxazole-Trimethoprim] Unknown (comments)    Blueberry Swelling     Causes throat swelling    Ciprofloxacin Itching    Codeine Other (comments)     Jumpy feeling    Crestor [Rosuvastatin] Itching    Darvocet A500 [Propoxyphene N-Acetaminophen] Itching    Demerol [Meperidine] Itching    Levaquin [Levofloxacin] Itching    Lipitor [Atorvastatin] Myalgia    Magnesium Oxide Itching     nausea    Minocin [Minocycline] Unknown (comments)    Pcn [Penicillins] Itching    Pravachol [Pravastatin] Swelling     Swelling in mouth     Shellfish Derived Unknown (comments)    Sulfa (Sulfonamide Antibiotics) Itching    Ultracet [Tramadol-Acetaminophen] Itching    Vicodin [Hydrocodone-Acetaminophen] Unknown (comments)    Vytorin 10-10 [Ezetimibe-Simvastatin] Myalgia    Percodan [Oxycodone Hcl-Oxycodone-Asa] Itching       Review of Systems  Pertinent items are noted in the History of Present Illness. Physical Exam:      Visit Vitals    /75    Pulse 85    Temp 98.6 °F (37 °C)    Resp 18    Wt 100.2 kg (220 lb 14.4 oz)    SpO2 96%    Breastfeeding No    BMI 34.6 kg/m2       Physical Exam:  Physical exam not obtained due to patient factors. Labs Reviewed:  Lab results reviewed. For significant abnormal values and values requiring intervention, see assessment and plan.     Assessment/Plan     Active Problems:    Hypervolemia (6/6/2017)      Anemia (6/6/2017)        As above.     Thank you.

## 2017-06-14 NOTE — PROGRESS NOTES
Gilson Wallace M.D. PROGRESS NOTE    Name: Arya Ley MRN: [id number]   : 1950 Hospital: [facility]   Date: 2017  Admission Date: 2017     Subjective/Objective/Plans  1. Psoas infection  2. Paraplegia  3. DM  4. HLD    Still having spasms despite Flexeril    Plan  Bone scan per ID  DC Flexeril  Increase Neurontin 600 mg TID    Vital Signs:  Visit Vitals    /74 (BP 1 Location: Left arm, BP Patient Position: At rest)    Pulse 81    Temp 99.2 °F (37.3 °C)    Resp 18    Wt 100.2 kg (220 lb 14.4 oz)  Comment: bed inoperable    SpO2 97%    Breastfeeding No    BMI 34.6 kg/m2       O2 Device: Room air       Temp (24hrs), Av.7 °F (37.1 °C), Min:98.1 °F (36.7 °C), Max:99.2 °F (37.3 °C)     5:26 AM  Intake/Output:   Last shift:      1901 -  0700  In: -   Out: 800 [Urine:800]  Last 3 shifts:  07 - 1900  In: 1980 [P.O.:1780; I.V.:200]  Out: 4751 [Urine:4750]    Intake/Output Summary (Last 24 hours) at 17 0526  Last data filed at 17 0439   Gross per 24 hour   Intake              800 ml   Output             2951 ml   Net            -2151 ml         Physical Exam:    General: in no apparent distress    HEENT: pupils equal, no ear discharge   Neck: No abnormally enlarged lymph nodes. , no JDV   Mouth: MMM no lesions    Chest: normal, no breast masses   Lungs: normal air entry   Heart: Regular rate and rhythm   Abdomen: abdomen is soft without significant tenderness, masses, organomegaly or guarding   Extremity: paraplegia   Neuro: alert    Skin: Skin color, texture, turgor normal. No rashes or lesions    Data Review:    Labs: Results:       Chemistry No results for input(s): GLU, NA, K, CL, CO2, BUN, CREA, CA, AGAP, BUCR, TBILI, GPT, AP, TP, ALB, GLOB, AGRAT in the last 72 hours. CBC w/Diff No results for input(s): WBC, RBC, HGB, HCT, PLT, GRANS, LYMPH, EOS, HGBEXT, HCTEXT, PLTEXT in the last 72 hours.    Cardiac Enzymes No results for input(s): CPK, CKND1, CARLI in the last 72 hours. No lab exists for component: CKRMB, TROIP   Coagulation No results for input(s): PTP, INR, APTT in the last 72 hours. No lab exists for component: INREXT    Lipid Panel Lab Results   Component Value Date/Time    Cholesterol, total 154 07/24/2012 01:00 AM    HDL Cholesterol 48 07/24/2012 01:00 AM    LDL, calculated 90.4 07/24/2012 01:00 AM    VLDL, calculated 15.6 07/24/2012 01:00 AM    Triglyceride 78 07/24/2012 01:00 AM    CHOL/HDL Ratio 3.2 07/24/2012 01:00 AM      BNP No results for input(s): BNPP in the last 72 hours. Liver Enzymes No results for input(s): TP, ALB, TBILI, AP, SGOT, ALT, CBIL in the last 72 hours. Thyroid Studies Lab Results   Component Value Date/Time    TSH 0.51 04/20/2017 07:26 PM          Procedures/imaging: see electronic medical records for all procedures, Xrays and details which were not copied into this note but were reviewed. Allergies:   Allergies   Allergen Reactions    Vancomycin Itching    Ampicillin Itching    Bactrim [Sulfamethoxazole-Trimethoprim] Unknown (comments)    Blueberry Swelling     Causes throat swelling    Ciprofloxacin Itching    Codeine Other (comments)     Jumpy feeling    Crestor [Rosuvastatin] Itching    Darvocet A500 [Propoxyphene N-Acetaminophen] Itching    Demerol [Meperidine] Itching    Levaquin [Levofloxacin] Itching    Lipitor [Atorvastatin] Myalgia    Magnesium Oxide Itching     nausea    Minocin [Minocycline] Unknown (comments)    Pcn [Penicillins] Itching    Pravachol [Pravastatin] Swelling     Swelling in mouth     Shellfish Derived Unknown (comments)    Sulfa (Sulfonamide Antibiotics) Itching    Ultracet [Tramadol-Acetaminophen] Itching    Vicodin [Hydrocodone-Acetaminophen] Unknown (comments)    Vytorin 10-10 [Ezetimibe-Simvastatin] Myalgia    Percodan [Oxycodone Hcl-Oxycodone-Asa] Itching       Home Medications:  Prior to Admission Medications   Prescriptions Last Dose Informant Patient Reported? Taking? Lactobacillus Acidoph & Bulgar (FLORANEX) 1 million cell tab tablet   No No   Sig: Take 1 Tab by mouth two (2) times a day for 21 days. acetaminophen (TYLENOL) 325 mg tablet   No No   Sig: Take 2 Tabs by mouth every four (4) hours as needed (for fever or pain level less than 5/10). Indications: Fever, Pain   albuterol-ipratropium (DUO-NEB) 2.5 mg-0.5 mg/3 ml nebu   No No   Sig: 3 mL by Nebulization route four (4) times daily. allopurinol (ZYLOPRIM) 100 mg tablet   No No   Sig: Take 0.5 Tabs by mouth daily. Indications: HYPERURICEMIA   aluminum-magnesium hydroxide (MAALOX) 200-200 mg/5 mL suspension   No No   Sig: Take 15 mL by mouth four (4) times daily as needed for Indigestion. cefTRIAXone 2 gram 2 g IVPB   No No   Si g by IntraVENous route every twenty-four (24) hours for 20 days. cholecalciferol (VITAMIN D3) 1,000 unit tablet   No No   Sig: Take 2 Tabs by mouth daily. Indications: PREVENTION OF VITAMIN D DEFICIENCY   famotidine (PEPCID) 20 mg tablet   No No   Sig: Take 1 Tab by mouth nightly. ferrous sulfate 325 mg (65 mg iron) tablet   No No   Sig: Take 1 Tab by mouth two (2) times daily (with meals) for 14 days. folic acid (FOLVITE) 1 mg tablet   No No   Sig: Take 1 Tab by mouth daily. insulin aspart (NOVOLOG) 100 unit/mL injection   No No   Sig: INITIATE INSULIN CORRECTIVE PROTOCOL (HR): Normal Insulin Sensitivity  For Blood Sugar (mg/dL) of:    Less than 150 =   0 units          150 -199 =   2 units 200 -249 =   4 units 250 -299 =   6 units 300 -349 =   8 units 350 and above =   10 units If 2 glucose readings are above 200 mg/dL   insulin glargine (LANTUS) 100 unit/mL injection   No No   Si Units by SubCUTAneous route nightly.  Indications: type 2 diabetes mellitus   midodrine (PROAMITINE) 2.5 mg tablet   No No   Si mg [2 tablets] po three time a day for 5 days then 2.5 mg po three time a day x 5 days then stop   ondansetron (ZOFRAN ODT) 4 mg disintegrating tablet   Yes No   Sig: Take 4 mg by mouth every eight (8) hours as needed for Nausea. oxyCODONE-acetaminophen (PERCOCET 7.5) 7.5-325 mg per tablet   No No   Sig: Take 1 Tab by mouth every six (6) hours as needed (for pain level greater than 5/10). Max Daily Amount: 4 Tabs. Indications: Pain   polyethylene glycol (MIRALAX) 17 gram packet   No No   Sig: Take 1 Packet by mouth two (2) times a day. pravastatin (PRAVACHOL) 40 mg tablet   Yes No   Sig: Take 40 mg by mouth nightly. Indications: DYSLIPIDEMIA   senna-docusate (PERICOLACE) 8.6-50 mg per tablet   No No   Sig: Take 2 Tabs by mouth daily (after dinner).  Indications: Constipation      Facility-Administered Medications: None       Current Medications:  Current Facility-Administered Medications   Medication Dose Route Frequency    sodium chloride (NS) flush 10-30 mL  10-30 mL InterCATHeter PRN    sodium chloride (NS) flush 10 mL  10 mL InterCATHeter Q24H    sodium chloride (NS) flush 10 mL  10 mL InterCATHeter PRN    sodium chloride (NS) flush 10-40 mL  10-40 mL InterCATHeter Q8H    sodium chloride (NS) flush 20 mL  20 mL InterCATHeter Q24H    alteplase (CATHFLO) 1 mg in sterile water (preservative free) 1 mL injection  1 mg InterCATHeter PRN    cyclobenzaprine (FLEXERIL) tablet 10 mg  10 mg Oral TID PRN    furosemide (LASIX) injection 40 mg  40 mg IntraVENous DAILY    gabapentin (NEURONTIN) capsule 600 mg  600 mg Oral BID    potassium chloride (K-DUR, KLOR-CON) SR tablet 20 mEq  20 mEq Oral BID    ondansetron (ZOFRAN ODT) tablet 4 mg  4 mg Oral Q8H PRN    acetaminophen (TYLENOL) tablet 650 mg  650 mg Oral Q4H PRN    senna-docusate (PERICOLACE) 8.6-50 mg per tablet 2 Tab  2 Tab Oral PCD    cholecalciferol (VITAMIN D3) tablet 2,000 Units  2,000 Units Oral DAILY    oxyCODONE-acetaminophen (PERCOCET 7.5) 7.5-325 mg per tablet 1 Tab  1 Tab Oral Q6H PRN    allopurinol (ZYLOPRIM) tablet 50 mg  50 mg Oral DAILY    midodrine (PROAMITINE) tablet 2.5 mg  2.5 mg Oral TID WITH MEALS    Lactobacillus Acidoph & Bulgar CRESTNavos Health) tablet 1 Tab  1 Tab Oral BID    folic acid (FOLVITE) tablet 1 mg  1 mg Oral DAILY    insulin glargine (LANTUS) injection 5 Units  5 Units SubCUTAneous QHS    ferrous sulfate tablet 325 mg  325 mg Oral BID WITH MEALS    famotidine (PEPCID) tablet 20 mg  20 mg Oral QHS    polyethylene glycol (MIRALAX) packet 17 g  17 g Oral BID    cefTRIAXone (ROCEPHIN) 2 g in 0.9% sodium chloride (MBP/ADV) 50 mL MBP  2 g IntraVENous Q24H    aluminum-magnesium hydroxide (MAALOX) oral suspension 15 mL  15 mL Oral QID PRN    sodium chloride (NS) flush 5-10 mL  5-10 mL IntraVENous Q8H    sodium chloride (NS) flush 5-10 mL  5-10 mL IntraVENous PRN    albuterol-ipratropium (DUO-NEB) 2.5 MG-0.5 MG/3 ML  3 mL Nebulization Q4H PRN    magnesium oxide (MAG-OX) tablet 400 mg  400 mg Oral BID    diphenhydrAMINE (BENADRYL) capsule 25 mg  25 mg Oral BID    insulin lispro (HUMALOG) injection   SubCUTAneous AC&HS    glucose chewable tablet 16 g  16 g Oral PRN    glucagon (GLUCAGEN) injection 1 mg  1 mg IntraMUSCular PRN    dextrose (D50W) injection syrg 12.5-25 g  25-50 mL IntraVENous PRN    0.9% sodium chloride infusion 250 mL  250 mL IntraVENous PRN       Chart and notes reviewed. Data reviewed. I have evaluated and examined the patient. IMPRESSION:   Patient Active Problem List   Diagnosis Code    [facility] I42.8    History of complete heart block Z86.79    Biventricular implantable cardioverter-defibrillator in situ Z95.810    Left bundle branch block (LBBB) on electrocardiogram I44.7    Type 2 diabetes mellitus with diabetic neuropathy (HCC) E11.40    Dyslipidemia E78.5    Diabetic neuropathy associated with type 2 diabetes mellitus ([facility] E11.40    Obstructive sleep apnea on CPAP G47.33, Z99.89    AICD generator infection ([facility] T82. 7XXA    Difficult airway for intubation T88. 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 1XXA    Impaired mobility and ADLs Z74.09    History of Coumadin therapy Z79.01    Gout M10.9    Acute paraplegia (HCC) G82.20    Sepsis ([address] A41.9    Psoas abscess, right ([address] K68.12    Krueger catheter in place on admission Z96.0    Urinary tract infection due to Enterococcus N39.0, B95.2    Group B streptococcal infection A49.1    Hypervolemia E87.70    Anemia D64.9 ·         PLAN:/DISCUSSION:   · Increase Neurontin dose        Allyn Goltz, MD  6/14/2017, 5:26 AM

## 2017-06-14 NOTE — PROGRESS NOTES
NUTRITION    BPA/MST Referral       RECOMMENDATIONS / PLAN:     - Continue current nutrition interventions. - Continue RD inpatient monitoring and evaluation. NUTRITION INTERVENTIONS & DIAGNOSIS:     [x] Meals/Snacks: modified diet  [x] Medical food supplementation: Glucerna Shake once daily    Nutrition Diagnosis:   Altered nutrition related laboratory value related to history of diabetes and excessive carbohydrate intake as evidenced by hyperglycemia, blood glucose up to 275 mg/dL today. Inadequate oral intake related to decreased appetite as evidenced by variable meal intake, consuming between 50-80% of meals for the past several days. ASSESSMENT:     6/14: Variable meal intake. Good appetite today, ate some of breakfast and most of lunch today. Likes Glucerna Shake supplements. BG up to 275 mg/dL today. 6/9: Appetite decreased, fair intake of meals, usually 50% consumed. BG levels range:  mg/dL. Discussed adding a supplement to consume if she only consumes 50% or less of meals. 6/7: Good appetite, tolerating diet and consuming most of meals.      Average po intake adequate to meet patients estimated nutritional needs:   [] Yes     [x] No   [] Unable to determine at this time    Diet: DIET DIABETIC CONSISTENT CARB Regular  DIET NUTRITIONAL SUPPLEMENTS Lunch; Ke[address] Allergies: blueberry, shellfish   Current Appetite:   [x] Good     [] Fair     [] Poor     [] Other:  Appetite/meal intake prior to admission:   [x] Good     [] Fair     [] Poor     [] Other:  Feeding Limitations:  [] Swallowing difficulty    [] Chewing difficulty    [] Other:  Current Meal Intake:   Patient Vitals for the past 100 hrs:   % Diet Eaten   06/13/17 1859 80 %   06/13/17 1300 60 %   06/13/17 0900 60 %   06/12/17 1750 90 %   06/12/17 1246 75 %   06/12/17 0905 75 %   06/11/17 1836 50 %   06/11/17 1345 50 %   06/11/17 0930 100 %   06/10/17 1736 75 %   06/10/17 1249 100 %     BM: 6/13  Skin Integrity: skin tear to buttocks (not pressure per documentation)   Edema: 2+ LEs  Pertinent Medications: Reviewed    Recent Labs      06/13/17   0357   MG  1.7       Intake/Output Summary (Last 24 hours) at 06/14/17 1406  Last data filed at 06/14/17 1117   Gross per 24 hour   Intake              250 ml   Output             2851 ml   Net            -2601 ml       Anthropometrics:  Ht Readings from Last 1 Encounters:   05/18/17 5' 7\" (1.702 m)     Last 3 Recorded Weights in this Encounter    06/10/17 0607 06/13/17 0042 06/14/17 0112   Weight: 99.8 kg (220 lb) 100.4 kg (221 lb 5.5 oz) 100.2 kg (220 lb 14.4 oz)     Body mass index is 34.6 kg/(m^2). Obese, Class II     Weight History: patient reports usual weight of 238 lb (weight gain due to fluid)     Weight Metrics 6/14/2017 5/25/2017 4/20/2017 4/6/2017 4/6/2017 3/30/2017 3/16/2017   Weight 220 lb 14.4 oz 244 lb 6.4 oz - 233 lb 227 lb 1.6 oz - 240 lb   BMI 34.6 kg/m2 - 38.28 kg/m2 36.49 kg/m2 - 35.57 kg/m2 37.59 kg/m2        Admitting Diagnosis: Anemia  Hypervolemia  Anemia  PMHx: cardiomyopathy, DM, dyslipidemia, diabetic neuropathy, CHF, GOUT    Education Needs:        [x] None identified  [] Identified - Not appropriate at this time  []  Identified and addressed - refer to education log  Learning Limitations:   [x] None identified  [] Identified    Cultural, Manda[place] & ethnic food preferences:  [x] None identified    [] Identified and addressed     ESTIMATED NUTRITION NEEDS:     Calories: 1907-6192 kcal (MSJx1.2-1.3) based on  [x] Usual/Actual  kg    [] IBW   CHO: 248-269 gm (50% kcal)   Protein:  gm (0.8-1 gm/kg) based on  [x] Actual BW      [] IBW   Fluid: 1 mL/kcal     MONITORING & EVALUATION:     Nutrition Goal(s):   1. Po intake of meals will meet >75% of patient estimated nutritional needs within the next 7 days.   Outcome:  [] Met/Ongoing    [x]  Not Met    [] New/Initial Goal      Monitoring:   [x] Diet tolerance   [x] Meal intake   [x] Supplement intake   [] GI symptoms/ability to tolerate po diet   [] Respiratory status   [x] Glycemic control, blood glucose level     Previous Recommendations (for follow-up assessments only):     [x]   Implemented       []   Not Implemented (RD to address)     [] No Recommendation Made     Discharge Planning: cardiac, diabetic diet   [x] Participated in care planning, discharge planning, & interdisciplinary rounds as appropriate      Rashi Patel, [address] [place] [address], [address] [place] [address]    Pager: [phone]

## 2017-06-15 ENCOUNTER — TELEPHONE (OUTPATIENT)
Dept: CARDIOLOGY CLINIC | Age: 67
End: 2017-06-15

## 2017-06-15 LAB
ANION GAP BLD CALC-SCNC: 8 MMOL/L (ref 3–18)
BASOPHILS # BLD AUTO: 0 K/UL (ref 0–0.1)
BASOPHILS # BLD: 0 % (ref 0–2)
BUN SERPL-MCNC: 19 MG/DL (ref 7–18)
BUN/CREAT SERPL: 20 (ref 12–20)
CALCIUM SERPL-MCNC: 8 MG/DL (ref 8.5–10.1)
CHLORIDE SERPL-SCNC: 102 MMOL/L (ref 100–108)
CO2 SERPL-SCNC: 28 MMOL/L (ref 21–32)
CREAT SERPL-MCNC: 0.96 MG/DL (ref 0.6–1.3)
DIFFERENTIAL METHOD BLD: ABNORMAL
EOSINOPHIL # BLD: 0.1 K/UL (ref 0–0.4)
EOSINOPHIL NFR BLD: 2 % (ref 0–5)
ERYTHROCYTE [DISTWIDTH] IN BLOOD BY AUTOMATED COUNT: 16.9 % (ref 11.6–14.5)
GLUCOSE BLD STRIP.AUTO-MCNC: 183 MG/DL (ref 70–110)
GLUCOSE BLD STRIP.AUTO-MCNC: 195 MG/DL (ref 70–110)
GLUCOSE BLD STRIP.AUTO-MCNC: 215 MG/DL (ref 70–110)
GLUCOSE BLD STRIP.AUTO-MCNC: 222 MG/DL (ref 70–110)
GLUCOSE SERPL-MCNC: 194 MG/DL (ref 74–99)
HCT VFR BLD AUTO: 29.8 % (ref 35–45)
HEMOCCULT STL QL: NEGATIVE
HGB BLD-MCNC: 9.6 G/DL (ref 12–16)
LYMPHOCYTES # BLD AUTO: 29 % (ref 21–52)
LYMPHOCYTES # BLD: 1.8 K/UL (ref 0.9–3.6)
MAGNESIUM SERPL-MCNC: 1.7 MG/DL (ref 1.6–2.6)
MCH RBC QN AUTO: 26.6 PG (ref 24–34)
MCHC RBC AUTO-ENTMCNC: 32.2 G/DL (ref 31–37)
MCV RBC AUTO: 82.5 FL (ref 74–97)
MONOCYTES # BLD: 0.5 K/UL (ref 0.05–1.2)
MONOCYTES NFR BLD AUTO: 8 % (ref 3–10)
NEUTS SEG # BLD: 3.8 K/UL (ref 1.8–8)
NEUTS SEG NFR BLD AUTO: 61 % (ref 40–73)
PLATELET # BLD AUTO: 241 K/UL (ref 135–420)
PMV BLD AUTO: 8.9 FL (ref 9.2–11.8)
POTASSIUM SERPL-SCNC: 4 MMOL/L (ref 3.5–5.5)
RBC # BLD AUTO: 3.61 M/UL (ref 4.2–5.3)
SODIUM SERPL-SCNC: 138 MMOL/L (ref 136–145)
WBC # BLD AUTO: 6.2 K/UL (ref 4.6–13.2)

## 2017-06-15 PROCEDURE — 83735 ASSAY OF MAGNESIUM: CPT | Performed by: FAMILY MEDICINE

## 2017-06-15 PROCEDURE — 82962 GLUCOSE BLOOD TEST: CPT

## 2017-06-15 PROCEDURE — 82272 OCCULT BLD FECES 1-3 TESTS: CPT | Performed by: FAMILY MEDICINE

## 2017-06-15 PROCEDURE — 85025 COMPLETE CBC W/AUTO DIFF WBC: CPT | Performed by: INTERNAL MEDICINE

## 2017-06-15 PROCEDURE — 74011000258 HC RX REV CODE- 258: Performed by: FAMILY MEDICINE

## 2017-06-15 PROCEDURE — 77030033263 HC DRSG MEPILEX 16-48IN BORD MOLN -B

## 2017-06-15 PROCEDURE — 74011636637 HC RX REV CODE- 636/637: Performed by: FAMILY MEDICINE

## 2017-06-15 PROCEDURE — 74011250637 HC RX REV CODE- 250/637: Performed by: FAMILY MEDICINE

## 2017-06-15 PROCEDURE — 74011250636 HC RX REV CODE- 250/636: Performed by: FAMILY MEDICINE

## 2017-06-15 PROCEDURE — 80048 BASIC METABOLIC PNL TOTAL CA: CPT | Performed by: FAMILY MEDICINE

## 2017-06-15 PROCEDURE — 65660000000 HC RM CCU STEPDOWN

## 2017-06-15 RX ORDER — DIPHENHYDRAMINE HCL 25 MG
25 CAPSULE ORAL
Status: DISCONTINUED | OUTPATIENT
Start: 2017-06-15 | End: 2017-06-22 | Stop reason: HOSPADM

## 2017-06-15 RX ORDER — FUROSEMIDE 40 MG/1
40 TABLET ORAL DAILY
Status: DISCONTINUED | OUTPATIENT
Start: 2017-06-15 | End: 2017-06-22 | Stop reason: HOSPADM

## 2017-06-15 RX ADMIN — DIPHENHYDRAMINE HYDROCHLORIDE 25 MG: 25 CAPSULE ORAL at 11:05

## 2017-06-15 RX ADMIN — FERROUS SULFATE TAB 325 MG (65 MG ELEMENTAL FE) 325 MG: 325 (65 FE) TAB at 17:41

## 2017-06-15 RX ADMIN — ALLOPURINOL 50 MG: 100 TABLET ORAL at 11:04

## 2017-06-15 RX ADMIN — Medication 10 ML: at 22:54

## 2017-06-15 RX ADMIN — Medication 10 ML: at 11:14

## 2017-06-15 RX ADMIN — ACETAMINOPHEN 650 MG: 325 TABLET ORAL at 16:00

## 2017-06-15 RX ADMIN — VITAMIN D, TAB 1000IU (100/BT) 2000 UNITS: 25 TAB at 11:04

## 2017-06-15 RX ADMIN — FAMOTIDINE 20 MG: 20 TABLET ORAL at 22:46

## 2017-06-15 RX ADMIN — INSULIN LISPRO 2 UNITS: 100 INJECTION, SOLUTION INTRAVENOUS; SUBCUTANEOUS at 16:01

## 2017-06-15 RX ADMIN — CEFTRIAXONE 2 G: 2 INJECTION, POWDER, FOR SOLUTION INTRAMUSCULAR; INTRAVENOUS at 01:19

## 2017-06-15 RX ADMIN — LACTOBACILLUS TAB 1 TABLET: TAB at 11:04

## 2017-06-15 RX ADMIN — LACTOBACILLUS TAB 1 TABLET: TAB at 17:41

## 2017-06-15 RX ADMIN — Medication 10 ML: at 17:45

## 2017-06-15 RX ADMIN — FOLIC ACID 1 MG: 1 TABLET ORAL at 11:04

## 2017-06-15 RX ADMIN — Medication 400 MG: at 11:04

## 2017-06-15 RX ADMIN — Medication 400 MG: at 17:41

## 2017-06-15 RX ADMIN — FUROSEMIDE 40 MG: 10 INJECTION, SOLUTION INTRAMUSCULAR; INTRAVENOUS at 11:04

## 2017-06-15 RX ADMIN — GABAPENTIN 600 MG: 300 CAPSULE ORAL at 17:41

## 2017-06-15 RX ADMIN — INSULIN GLARGINE 5 UNITS: 100 INJECTION, SOLUTION SUBCUTANEOUS at 22:52

## 2017-06-15 RX ADMIN — POTASSIUM CHLORIDE 20 MEQ: 20 TABLET, EXTENDED RELEASE ORAL at 17:41

## 2017-06-15 RX ADMIN — FUROSEMIDE 40 MG: 40 TABLET ORAL at 16:00

## 2017-06-15 RX ADMIN — POTASSIUM CHLORIDE 20 MEQ: 20 TABLET, EXTENDED RELEASE ORAL at 11:04

## 2017-06-15 RX ADMIN — GABAPENTIN 600 MG: 300 CAPSULE ORAL at 11:04

## 2017-06-15 RX ADMIN — INSULIN LISPRO 4 UNITS: 100 INJECTION, SOLUTION INTRAVENOUS; SUBCUTANEOUS at 22:49

## 2017-06-15 RX ADMIN — FERROUS SULFATE TAB 325 MG (65 MG ELEMENTAL FE) 325 MG: 325 (65 FE) TAB at 11:05

## 2017-06-15 RX ADMIN — Medication 10 ML: at 17:44

## 2017-06-15 RX ADMIN — MIDODRINE HYDROCHLORIDE 2.5 MG: 2.5 TABLET ORAL at 11:05

## 2017-06-15 RX ADMIN — MIDODRINE HYDROCHLORIDE 2.5 MG: 2.5 TABLET ORAL at 16:00

## 2017-06-15 RX ADMIN — DIPHENHYDRAMINE HYDROCHLORIDE 25 MG: 25 CAPSULE ORAL at 17:41

## 2017-06-15 RX ADMIN — Medication 20 ML: at 17:45

## 2017-06-15 NOTE — PROGRESS NOTES
[facility] Readmission Patient Interview    The following is related to the patient's last admission and the events following discharge from the hospital:      Date of last hospital discharge:  05-17-17    Date of Readmission:  06-06-17    Reason for Readmission:    Drop in HBG, fluid retention, fever  Were you kept informed about your diagnoses during your stay in the hospital and what was being done to further evaluate and treat them? * in reference to index admission*      Interviewer Notes:       [] None of time   [] Some of time   [] Most of time   [x] All of time   At the time of your discharge, did someone talk to you about:  *if patient answers yes - ask them to recall what information they remember*     1. What your diagnoses were? 2. What tests or procedures needed to be done after you left? 3. What to watch out for regarding worsening of your disease? 4. What to do if you were experiencing worsening of your disease? 5. Who to contact (and how) if you were experiencing worsening of your disease? Interviewer Notes:cardiology appointment time/date not provided         [x] Yes  [] No  [] Not Sure   [] Yes  [x] No  [] Not Sure   [] Yes  [x] No  [] Not Sure   [x] Yes  [] No  [] Not Sure   [x] Yes  [] No  [] Not Sure   Were you asked about your understanding of these instructions? Interviewer Notes:Daughter was asked  to sign discharge papers while I was in process of transferring to rehab facility    [] Yes  [x] No  [] Not Sure   Although I requested a copy of the paperwork the nurse sated I could get a copy for the nursing home   [] Yes  [x] No  [] Not Sure   Were the written discharge instructions and plans easy to read and understand? Interviewer Notes: Never got a copy    [] Yes  [x] No  [x] Not Sure   How confident were you about understanding these instructions?     Interviewer Notes:  [] Very confident   [] Somewhat confident   [x] Not confident   [] Not Sure   Do you have a regular doctor who takes care of you for most things? Primary Providers Name/Practice Name:[facility]- had only seen her 2x as new pt. [x] Yes  [] No  [] Not Sure   At the time of your discharge, did someone talk to you about which medications to take when you left and which ones to discontinue? Interviewer Notes:    [] Yes  [x] No  [] Not Sure   Did you take your medications as they were prescribed? Interviewer Notes:they were administerd by SNF nursing staff  [x] Yes  [] No  [] Not Sure   If not, what were the difficulties you experienced with taking your medications? Comments:          After you left the hospital, did you have an appointment with your doctor? [] Yes  [x] No  [] Not Sure     If yes, who made the appointment? [] I did    [] My family   [] Hospital staff   [x] Not Sure   Were you able to get to this appointment?      Interviewer Notes:    [] Yes  [x] No  [] Not Sure   How do you think you became sick enough to be readmitted to the hospital?   Comment:    Unsure- began feeling ill like I had the week before my discharge on 05-17-17     Source:  Modified from [facility], [place], [place]

## 2017-06-15 NOTE — PROGRESS NOTES
Infectious Disease progress Note    Requested by: Dr. Lauren Self    Reason: sepsis    Current abx Prior abx   Ceftriaxone 4/24-5/15; 5/22- till date Fluconazole 5/16-5/30  Meropenem 5/15-5/22  Cefepime 4/20-4/21     Lines:       Assessment :      77 y.o., right handed female, with an established history of hypertension, complete heart block with permanent pacemaker placed, diabetes mellitus, diabetic peripheral neuropathy, obesity, admitted to [facility] on 6/6/2017.      Recent hospitalization (4/20/17-5/15/17) for Sepsis  secondary to group B streptococcus bloodstream infection (positive blood cultures 4/20, negative blood cultures 4/21). Most likely source of bloodstream infection is infected right psoas hematoma/abscess. 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       Enterococcus in urine cultures 4/20 likely colonizer    bilateral LE dvt - s/p IVC filter placement 5/11    Now with low grade fevers, abdominal pain, acute anemia. Highly complex clinical picture. Difficult to determine etiology of patients' symptoms. Differential diagnosis: undiagnosed GI bleed/anemia of chronic disease from spinal osteomyelitis   Persistent upper abdominal discomfort concerning for undiagnosed gastritis/peptic ulcer disease. Will f/u bone scan to rule out spinal infection/radiculopathy as the cause of upper abdominal pain    Abdominal pain and pain radiating down to right leg is likely radiculopathy/neuropathy - significant clinical improvement after initiation of neurontin    Constipation as seen on ct scan may be contributing to abdominal pain. 6x1 cm residual collection right psoas is likely serous fluid - most recent drains only drained serous material. Lack of fevers/increasing wbc/worsening right flank tenderness argues against worsening infection.    Ct findings discussed with  rubi - no drainable fluid collection in psoas - significant improvement in psoas abnormalities compared to prior ct scan    Bone scan reveals evidence of L2-L3 facet arthritis, osteomyelitis. patient doesn't have erythema at the site of pacemaker. No fluid collection noted at pacemaker pocket site per CT scan 6/7/17 - discussed with radiologist. At this time risk of removal of pacemaker/general pocket change exceed the benefit.        No evidence of pneumonia. No significant pyuria.      No leukocytosis        Recommendations:     1. continue ceftriaxone  2. Recommend spine surgery evaluation to determine need for any intervention since patient still has intermittent shooting pain in right flank, right leg (left message for dr. Renzo De Los Santos)  3. Will need iv antibiotics upon discharge      Above plan was discussed in details with patient, dr. Kb Gonzales. Please call me if any further questions or concerns. Will continue to participate in the care of this patient. subjective:    Feels better . Still with upper abdominal pain. improved right leg pain/flank pain. No fever, chills, diarrhea. Patient denies headaches, visual disturbances, sore throat, runny nose, earaches, cp, sob, chills, cough, diarrhea, burning micturition, increased pain or weakness in extremities. Home Medication List    Details   oxyCODONE-acetaminophen (PERCOCET 7.5) 7.5-325 mg per tablet Take 1 Tab by mouth every six (6) hours as needed (for pain level greater than 5/10). Max Daily Amount: 4 Tabs. Indications: Pain  Qty: 20 Tab, Refills: 0      albuterol-ipratropium (DUO-NEB) 2.5 mg-0.5 mg/3 ml nebu 3 mL by Nebulization route four (4) times daily. Qty: 30 Nebule, Refills: 0      allopurinol (ZYLOPRIM) 100 mg tablet Take 0.5 Tabs by mouth daily. Indications: HYPERURICEMIA  Qty: 30 Tab, Refills: 0      cefTRIAXone 2 gram 2 g IVPB 2 g by IntraVENous route every twenty-four (24) hours for 20 days.   Qty: 20 Dose, Refills: 0      midodrine (PROAMITINE) 2.5 mg tablet 5 mg [2 tablets] po three time a day for 5 days then 2.5 mg po three time a day x 5 days then stop  Qty: 45 Tab, Refills: 0      Lactobacillus Acidoph & Bulgar (FLORANEX) 1 million cell tab tablet Take 1 Tab by mouth two (2) times a day for 21 days. Qty: 42 Tab, Refills: 0      aluminum-magnesium hydroxide (MAALOX) 200-200 mg/5 mL suspension Take 15 mL by mouth four (4) times daily as needed for Indigestion. Qty: 100 mL, Refills: 0      folic acid (FOLVITE) 1 mg tablet Take 1 Tab by mouth daily. Qty: 30 Tab, Refills: 0      insulin glargine (LANTUS) 100 unit/mL injection 5 Units by SubCUTAneous route nightly. Indications: type 2 diabetes mellitus  Qty: 1 Vial, Refills: 0    Associated Diagnoses: Type 2 diabetes mellitus with diabetic neuropathy, with long-term current use of insulin (HCC)      ferrous sulfate 325 mg (65 mg iron) tablet Take 1 Tab by mouth two (2) times daily (with meals) for 14 days. Qty: 28 Tab, Refills: 0      famotidine (PEPCID) 20 mg tablet Take 1 Tab by mouth nightly. Qty: 30 Tab, Refills: 0      polyethylene glycol (MIRALAX) 17 gram packet Take 1 Packet by mouth two (2) times a day. Qty: 30 Packet, Refills: 0      cholecalciferol (VITAMIN D3) 1,000 unit tablet Take 2 Tabs by mouth daily. Indications: PREVENTION OF VITAMIN D DEFICIENCY  Qty: 30 Tab, Refills: 0      ondansetron (ZOFRAN ODT) 4 mg disintegrating tablet Take 4 mg by mouth every eight (8) hours as needed for Nausea. acetaminophen (TYLENOL) 325 mg tablet Take 2 Tabs by mouth every four (4) hours as needed (for fever or pain level less than 5/10). Indications: Fever, Pain  Qty: 30 Tab, Refills: 0    Associated Diagnoses: Iliopsoas muscle hematoma, right, subsequent encounter      senna-docusate (PERICOLACE) 8.6-50 mg per tablet Take 2 Tabs by mouth daily (after dinner). Indications: Constipation  Qty: 30 Tab, Refills: 0      pravastatin (PRAVACHOL) 40 mg tablet Take 40 mg by mouth nightly. Indications: DYSLIPIDEMIA      insulin aspart (NOVOLOG) 100 unit/mL injection INITIATE INSULIN CORRECTIVE PROTOCOL (HR): Normal Insulin Sensitivity  For Blood Sugar (mg/dL) of:    Less than 150 =   0 units          150 -199 =   2 units 200 -249 =   4 units 250 -299 =   6 units 300 -349 =   8 units 350 and above =   10 units If 2 glucose readings are above 200 mg/dL  Qty: 10 mL, Refills: 6             Current Facility-Administered Medications   Medication Dose Route Frequency    gabapentin (NEURONTIN) capsule 600 mg  600 mg Oral BID    sodium chloride (NS) flush 10-30 mL  10-30 mL InterCATHeter PRN    sodium chloride (NS) flush 10 mL  10 mL InterCATHeter Q24H    sodium chloride (NS) flush 10 mL  10 mL InterCATHeter PRN    sodium chloride (NS) flush 10-40 mL  10-40 mL InterCATHeter Q8H    sodium chloride (NS) flush 20 mL  20 mL InterCATHeter Q24H    alteplase (CATHFLO) 1 mg in sterile water (preservative free) 1 mL injection  1 mg InterCATHeter PRN    furosemide (LASIX) injection 40 mg  40 mg IntraVENous DAILY    potassium chloride (K-DUR, KLOR-CON) SR tablet 20 mEq  20 mEq Oral BID    ondansetron (ZOFRAN ODT) tablet 4 mg  4 mg Oral Q8H PRN    acetaminophen (TYLENOL) tablet 650 mg  650 mg Oral Q4H PRN    senna-docusate (PERICOLACE) 8.6-50 mg per tablet 2 Tab  2 Tab Oral PCD    cholecalciferol (VITAMIN D3) tablet 2,000 Units  2,000 Units Oral DAILY    oxyCODONE-acetaminophen (PERCOCET 7.5) 7.5-325 mg per tablet 1 Tab  1 Tab Oral Q6H PRN    allopurinol (ZYLOPRIM) tablet 50 mg  50 mg Oral DAILY    midodrine (PROAMITINE) tablet 2.5 mg  2.5 mg Oral TID WITH MEALS    Lactobacillus Acidoph & Bulgar (FLORANEX) tablet 1 Tab  1 Tab Oral BID    folic acid (FOLVITE) tablet 1 mg  1 mg Oral DAILY    insulin glargine (LANTUS) injection 5 Units  5 Units SubCUTAneous QHS    ferrous sulfate tablet 325 mg  325 mg Oral BID WITH MEALS    famotidine (PEPCID) tablet 20 mg  20 mg Oral QHS    polyethylene glycol (MIRALAX) packet 17 g  17 g Oral BID    cefTRIAXone (ROCEPHIN) 2 g in 0.9% sodium chloride (MBP/ADV) 50 mL MBP  2 g IntraVENous Q24H    aluminum-magnesium hydroxide (MAALOX) oral suspension 15 mL  15 mL Oral QID PRN    sodium chloride (NS) flush 5-10 mL  5-10 mL IntraVENous Q8H    sodium chloride (NS) flush 5-10 mL  5-10 mL IntraVENous PRN    albuterol-ipratropium (DUO-NEB) 2.5 MG-0.5 MG/3 ML  3 mL Nebulization Q4H PRN    magnesium oxide (MAG-OX) tablet 400 mg  400 mg Oral BID    diphenhydrAMINE (BENADRYL) capsule 25 mg  25 mg Oral BID    insulin lispro (HUMALOG) injection   SubCUTAneous AC&HS    glucose chewable tablet 16 g  16 g Oral PRN    glucagon (GLUCAGEN) injection 1 mg  1 mg IntraMUSCular PRN    dextrose (D50W) injection syrg 12.5-25 g  25-50 mL IntraVENous PRN    0.9% sodium chloride infusion 250 mL  250 mL IntraVENous PRN       Allergies: Vancomycin; Ampicillin; Bactrim [sulfamethoxazole-trimethoprim]; Blueberry; Ciprofloxacin; Codeine; Crestor [rosuvastatin]; Darvocet a500 [propoxyphene n-acetaminophen]; Demerol [meperidine]; Levaquin [levofloxacin]; Lipitor [atorvastatin]; Magnesium oxide; Minocin [minocycline]; Pcn [penicillins]; Pravachol [pravastatin]; Shellfish derived; Sulfa (sulfonamide antibiotics); Ultracet [tramadol-acetaminophen]; Vicodin [hydrocodone-acetaminophen]; Vytorin 10-10 [ezetimibe-simvastatin]; and Percodan [oxycodone hcl-oxycodone-asa]    ROS: 12 point ROS obtained in details. Pertinent positives as mentioned in HPI,   otherwise negative    Physical Exam:    General: Well developed, well nourished female laying on the bed, AAOx3 NAD     General:  awake alert and oriented   HEENT:  Normocephalic, atraumatic, PERRL, EOMI, no scleral icterus or pallor; no conjunctival hemmohage; nasal and oral mucous are moist and without evidence of lesions. Neck supple, no bruits.    Lymph Nodes:  no cervical, axillary or inguinal adenopathy   Lungs:  non-labored, bilaterally clear to auscultation- no crackles wheezes rales or rhonchi   Heart:  s1 and s2 irregular; no rubs or gallops, no edema, + pedal pulses   Abdomen: soft, non-distended, active bowel sounds, no hepatomegaly, no splenomegaly. no tenderness over the left abdominal pacemaker, no overlying erythema or fluctuance, present epigastric/RUQ tenderness   Genitourinary: Krueger in place   Extremities:  no clubbing, cyanosis; no joint effusions or swelling; muscle mass appropriate for age   Neurologic:  No gross focal sensory abnormalities; 5/5 muscle strength to upper extremities. 0/5 strength in lower extremities. Cranial nerves intact   Skin:  Surgical scars abdomen, left neck well healed   Back: no paraspinal muscle guarding or rigidity, resolved right CVA tenderness, tenderness over lumbar spine around L4-L5   Psychiatric:  No suicidal or homicidal ideations, appropriate mood and affect          Labs: Results:   Chemistry No results for input(s): GLU, NA, K, CL, CO2, BUN, CREA, CA, AGAP, BUCR, TBIL, GPT, AP, TP, ALB, GLOB, AGRAT in the last 72 hours. CBC w/Diff No results for input(s): WBC, RBC, HGB, HCT, PLT, GRANS, LYMPH, EOS, HGBEXT, HCTEXT, PLTEXT, HGBEXT, HCTEXT, PLTEXT in the last 72 hours. Microbiology No results for input(s): CULT in the last 72 hours.        RADIOLOGY:    All available imaging studies/reports in [facility] for this admission were reviewed    Dr. Corinne Wylie, Infectious Disease Specialist  [phone]  Divya 15, 2017  8:43 AM

## 2017-06-15 NOTE — DISCHARGE SUMMARY
[facility]  TRANSFER SUMMARY    Name:  Mihir Stanton  MR#:  [id number]  :  1950  Account #:  [de-identified]  Date of Adm:  2017  Date of Transfer:2017      FINAL DIAGNOSES  1. Anemia of chronic illness. 2. 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 3. History of deep venous thrombosis with inferior vena cava filter  placement on previous admission. 4. Ischemic cardiomyopathy with defibrillator placement. 5. Chronic left-sided abdominal pain, with gastric  pacemaker. 6. Past history of chronic anticoagulation discontinued due to  gastrointestinal bleed. 7. Hyperlipidemia. 8. MULTIPLE DRUG ALLERGIES  a. VANCOMYCIN. b. CIPROFLOXACIN. c. PENICILLIN. d. Miko Pacer. f. BACLOFEN. g. VYTORIN.  h. PERCODAN. 9. History of orthostatic hypotension. 10. Hypokalemia. 11. Peripheral neuropathy. 12. Insulin-dependent diabetes mellitus. 13. Chronic constipation. 14. Hypomagnesemia. 15. UTI sepsis, perez catheter related    DIET: Diabetic, consistent carbohydrates. MEDICATION ON DISCHARGE  1. Rocephin 2 grams IV daily, till 17, last day  2. Vitamin D3, 2000 units daily. 3. Benadryl 25 mg 2 times daily for pruritus p.r.n.  4. Pepcid 20 mg at bedtime. 5. Ferrous sulfate 325 mg b.i.d.  6. Folic acid 1 mg daily. 7. Lasix 40 mg daily p.o.  8. Neurontin 600 mg 3 times daily. 9. Lantus 5 units at bedtime. 10. Humalog per sliding scale. 11. Floranex 1 tablet b.i.d.  12. Magnesium oxide 400 mg b.i.d. Please repeat magnesium, if  magnesium is better, magnesium can be discontinued. 13. Midodrine 2.5 mg 3 times a day. 14. MiraLax 17 grams daily. 15. KCl 20 mEq b.i.d. Again, repeat BMP. Discontinue KCl when  appropriate. 16. Negrita-Colace 2 tablets daily after dinner. 17. Tylenol 650 every 4 hours p.r.n. mild pain or fever. 18. DuoNeb 2.5 every 4 hours p.r.n. shortness of breath.   19. Percocet 7.5/325 one tablet every 6 hours p.r.n. pain. SUMMARY: This is a 14-year-old black female admitted from nursing  home because of fever and leg pains. She was sent to the ER because  of suspected sepsis. In emergency room, her hemoglobin was 6.9,  baseline is around 8. Stools for occult blood was negative. This patient  has a very complicated history from previous records, she had history  of ischemic cardiomyopathy and was on chronic anticoagulation but  developed psoas hematoma and epidural hemorrhage in the spine  causing paraplegia, therefore no longer a candidate for  anticoagulation. While in the hospital on previous admission, she had  an acute DVT, IVC filter was put in place. She was paraplegic at the  time of discharge and was sent back to the nursing home. She came  back because of fever, suspected sepsis. This patient has had right  iliopsoas hemorrhage with sepsis complicated by infection in the past  and possibly causing a similar problem. CLINICAL COURSE AND MANAGEMENT: The patient is paraplegic. She had low-grade fever. She has ischemic cardiomyopathy with  pacemaker defibrillator in place. She has paraplegia with neuropathic  pain in buttocks crease present on admission, she has left foot callus,  right foot callus under great toe present on admission. UTI suspected,  was put on Rocephin. Bone scan to be done today. If negative, she  can continue Rocephin another one week per ID. While in the hospital,  the patient complained of leg spasm. She is now able to move the left  leg, whereas in the past she was totally immobile. She has  encouraging neuropathic symptoms. I increased her Neurontin to 600  mg 3 times a day from paresthesias have improved, but she continued  to have a spasm. I put on Flexeril, but did not help, so this was  discontinued. I believe the patient will need intensive physical therapy  at this time to gain movement of her lower extremities which she is starting to have. Please see  medication reconciliation for complete list of medications. CODE STATUS: Is FULL CODE. DISPOSITION: She is going back to the nursing home for physical  therapy.         Romana Hayward, MD Elden Glenn / Taylor Meyer  D:  06/15/2017   13:04  T:  06/15/2017   13:58  Job #:  [id number]

## 2017-06-15 NOTE — TELEPHONE ENCOUNTER
Received a notice from [facility] that patient is past due for INR testing. Jaya was informed that patient is no longer on Coumadin and to contact patient for return of device.  Honorio Puente LPN

## 2017-06-15 NOTE — PROGRESS NOTES
Danya Jimenez M.D. PROGRESS NOTE    Name: Leonard Delgado MRN: [id number]   : 1950 Hospital: [address] [place] [address]   Date: 6/15/2017  Admission Date: 2017     Subjective/Objective/Plans  1. Psoas infection  2. Paraplegia  3. P. Neuropathy  4. DM    Neuropathic pain better with increase frequency of Neurontin, not lethargic with current dose  Still with spasms  If Bone scan negative, DD on IV antibiotics another week per ID      Vital Signs:  Visit Vitals    /69 (BP 1 Location: Left arm, BP Patient Position: At rest)    Pulse 97    Temp 98.2 °F (36.8 °C)    Resp 18    Wt 100.2 kg (220 lb 14.4 oz)  Comment: bed inoperable    SpO2 97%    Breastfeeding No    BMI 34.6 kg/m2       O2 Device: Room air       Temp (24hrs), Av.5 °F (36.9 °C), Min:97 °F (36.1 °C), Max:99.6 °F (37.6 °C)     6:28 AM  Intake/Output:   Last shift:      1901 - 06/15 0700  In: -   Out: 925 [Urine:925]  Last 3 shifts:  0701 -  1900  In: 1520 [P.O.:1520]  Out: 4251 [Urine:4250]    Intake/Output Summary (Last 24 hours) at 06/15/17 0628  Last data filed at 06/15/17 0431   Gross per 24 hour   Intake              720 ml   Output             2225 ml   Net            -1505 ml         Physical Exam:    General: in no apparent distress    HEENT: pupils equal, no ear discharge   Neck: No abnormally enlarged lymph nodes. , no JDV   Mouth: MMM no lesions    Chest: normal, no breast masses   Lungs: normal air entry   Heart: Regular rate and rhythm   Abdomen: abdomen is soft without significant tenderness, masses, organomegaly or guarding   Extremity: paraplegia   Neuro: alert    Skin: Skin color, texture, turgor normal. No rashes or lesions    Data Review:    Labs: Results:       Chemistry No results for input(s): GLU, NA, K, CL, CO2, BUN, CREA, CA, AGAP, BUCR, TBILI, GPT, AP, TP, ALB, GLOB, AGRAT in the last 72 hours.    CBC w/Diff No results for input(s): WBC, RBC, HGB, HCT, PLT, GRANS, LYMPH, EOS, HGBEXT, HCTEXT, PLTEXT in the last 72 hours. Cardiac Enzymes No results for input(s): CPK, CKND1, CARLI in the last 72 hours. No lab exists for component: CKRMB, TROIP   Coagulation No results for input(s): PTP, INR, APTT in the last 72 hours. No lab exists for component: INREXT    Lipid Panel Lab Results   Component Value Date/Time    Cholesterol, total 154 07/24/2012 01:00 AM    HDL Cholesterol 48 07/24/2012 01:00 AM    LDL, calculated 90.4 07/24/2012 01:00 AM    VLDL, calculated 15.6 07/24/2012 01:00 AM    Triglyceride 78 07/24/2012 01:00 AM    CHOL/HDL Ratio 3.2 07/24/2012 01:00 AM      BNP No results for input(s): BNPP in the last 72 hours. Liver Enzymes No results for input(s): TP, ALB, TBILI, AP, SGOT, ALT, CBIL in the last 72 hours. Thyroid Studies Lab Results   Component Value Date/Time    TSH 0.51 04/20/2017 07:26 PM          Procedures/imaging: see electronic medical records for all procedures, Xrays and details which were not copied into this note but were reviewed. Allergies:   Allergies   Allergen Reactions    Vancomycin Itching    Ampicillin Itching    Bactrim [Sulfamethoxazole-Trimethoprim] Unknown (comments)    Blueberry Swelling     Causes throat swelling    Ciprofloxacin Itching    Codeine Other (comments)     Jumpy feeling    Crestor [Rosuvastatin] Itching    Darvocet A500 [Propoxyphene N-Acetaminophen] Itching    Demerol [Meperidine] Itching    Levaquin [Levofloxacin] Itching    Lipitor [Atorvastatin] Myalgia    Magnesium Oxide Itching     nausea    Minocin [Minocycline] Unknown (comments)    Pcn [Penicillins] Itching    Pravachol [Pravastatin] Swelling     Swelling in mouth     Shellfish Derived Unknown (comments)    Sulfa (Sulfonamide Antibiotics) Itching    Ultracet [Tramadol-Acetaminophen] Itching    Vicodin [Hydrocodone-Acetaminophen] Unknown (comments)    Vytorin 10-10 [Ezetimibe-Simvastatin] Myalgia    Percodan [Oxycodone Hcl-Oxycodone-Asa] Itching       Home Medications:  Prior to Admission Medications   Prescriptions Last Dose Informant Patient Reported? Taking? Lactobacillus Acidoph & Bulgar (FLORANEX) 1 million cell tab tablet   No No   Sig: Take 1 Tab by mouth two (2) times a day for 21 days. acetaminophen (TYLENOL) 325 mg tablet   No No   Sig: Take 2 Tabs by mouth every four (4) hours as needed (for fever or pain level less than 5/10). Indications: Fever, Pain   albuterol-ipratropium (DUO-NEB) 2.5 mg-0.5 mg/3 ml nebu   No No   Sig: 3 mL by Nebulization route four (4) times daily. allopurinol (ZYLOPRIM) 100 mg tablet   No No   Sig: Take 0.5 Tabs by mouth daily. Indications: HYPERURICEMIA   aluminum-magnesium hydroxide (MAALOX) 200-200 mg/5 mL suspension   No No   Sig: Take 15 mL by mouth four (4) times daily as needed for Indigestion. cefTRIAXone 2 gram 2 g IVPB   No No   Si g by IntraVENous route every twenty-four (24) hours for 20 days. cholecalciferol (VITAMIN D3) 1,000 unit tablet   No No   Sig: Take 2 Tabs by mouth daily. Indications: PREVENTION OF VITAMIN D DEFICIENCY   famotidine (PEPCID) 20 mg tablet   No No   Sig: Take 1 Tab by mouth nightly. ferrous sulfate 325 mg (65 mg iron) tablet   No No   Sig: Take 1 Tab by mouth two (2) times daily (with meals) for 14 days. folic acid (FOLVITE) 1 mg tablet   No No   Sig: Take 1 Tab by mouth daily. insulin aspart (NOVOLOG) 100 unit/mL injection   No No   Sig: INITIATE INSULIN CORRECTIVE PROTOCOL (HR): Normal Insulin Sensitivity  For Blood Sugar (mg/dL) of:    Less than 150 =   0 units          150 -199 =   2 units 200 -249 =   4 units 250 -299 =   6 units 300 -349 =   8 units 350 and above =   10 units If 2 glucose readings are above 200 mg/dL   insulin glargine (LANTUS) 100 unit/mL injection   No No   Si Units by SubCUTAneous route nightly.  Indications: type 2 diabetes mellitus   midodrine (PROAMITINE) 2.5 mg tablet   No No   Si mg [2 tablets] po three time a day for 5 days then 2.5 mg po three time a day x 5 days then stop   ondansetron (ZOFRAN ODT) 4 mg disintegrating tablet   Yes No   Sig: Take 4 mg by mouth every eight (8) hours as needed for Nausea. oxyCODONE-acetaminophen (PERCOCET 7.5) 7.5-325 mg per tablet   No No   Sig: Take 1 Tab by mouth every six (6) hours as needed (for pain level greater than 5/10). Max Daily Amount: 4 Tabs. Indications: Pain   polyethylene glycol (MIRALAX) 17 gram packet   No No   Sig: Take 1 Packet by mouth two (2) times a day. pravastatin (PRAVACHOL) 40 mg tablet   Yes No   Sig: Take 40 mg by mouth nightly. Indications: DYSLIPIDEMIA   senna-docusate (PERICOLACE) 8.6-50 mg per tablet   No No   Sig: Take 2 Tabs by mouth daily (after dinner).  Indications: Constipation      Facility-Administered Medications: None       Current Medications:  Current Facility-Administered Medications   Medication Dose Route Frequency    gabapentin (NEURONTIN) capsule 600 mg  600 mg Oral BID    sodium chloride (NS) flush 10-30 mL  10-30 mL InterCATHeter PRN    sodium chloride (NS) flush 10 mL  10 mL InterCATHeter Q24H    sodium chloride (NS) flush 10 mL  10 mL InterCATHeter PRN    sodium chloride (NS) flush 10-40 mL  10-40 mL InterCATHeter Q8H    sodium chloride (NS) flush 20 mL  20 mL InterCATHeter Q24H    alteplase (CATHFLO) 1 mg in sterile water (preservative free) 1 mL injection  1 mg InterCATHeter PRN    furosemide (LASIX) injection 40 mg  40 mg IntraVENous DAILY    potassium chloride (K-DUR, KLOR-CON) SR tablet 20 mEq  20 mEq Oral BID    ondansetron (ZOFRAN ODT) tablet 4 mg  4 mg Oral Q8H PRN    acetaminophen (TYLENOL) tablet 650 mg  650 mg Oral Q4H PRN    senna-docusate (PERICOLACE) 8.6-50 mg per tablet 2 Tab  2 Tab Oral PCD    cholecalciferol (VITAMIN D3) tablet 2,000 Units  2,000 Units Oral DAILY    oxyCODONE-acetaminophen (PERCOCET 7.5) 7.5-325 mg per tablet 1 Tab  1 Tab Oral Q6H PRN    allopurinol (ZYLOPRIM) tablet 50 mg  50 mg Oral DAILY    midodrine (PROAMITINE) tablet 2.5 mg  2.5 mg Oral TID WITH MEALS    Lactobacillus Acidoph & Bulgar [facility]) tablet 1 Tab  1 Tab Oral BID    folic acid (FOLVITE) tablet 1 mg  1 mg Oral DAILY    insulin glargine (LANTUS) injection 5 Units  5 Units SubCUTAneous QHS    ferrous sulfate tablet 325 mg  325 mg Oral BID WITH MEALS    famotidine (PEPCID) tablet 20 mg  20 mg Oral QHS    polyethylene glycol (MIRALAX) packet 17 g  17 g Oral BID    cefTRIAXone (ROCEPHIN) 2 g in 0.9% sodium chloride (MBP/ADV) 50 mL MBP  2 g IntraVENous Q24H    aluminum-magnesium hydroxide (MAALOX) oral suspension 15 mL  15 mL Oral QID PRN    sodium chloride (NS) flush 5-10 mL  5-10 mL IntraVENous Q8H    sodium chloride (NS) flush 5-10 mL  5-10 mL IntraVENous PRN    albuterol-ipratropium (DUO-NEB) 2.5 MG-0.5 MG/3 ML  3 mL Nebulization Q4H PRN    magnesium oxide (MAG-OX) tablet 400 mg  400 mg Oral BID    diphenhydrAMINE (BENADRYL) capsule 25 mg  25 mg Oral BID    insulin lispro (HUMALOG) injection   SubCUTAneous AC&HS    glucose chewable tablet 16 g  16 g Oral PRN    glucagon (GLUCAGEN) injection 1 mg  1 mg IntraMUSCular PRN    dextrose (D50W) injection syrg 12.5-25 g  25-50 mL IntraVENous PRN    0.9% sodium chloride infusion 250 mL  250 mL IntraVENous PRN       Chart and notes reviewed. Data reviewed. I have evaluated and examined the patient. 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 7XXA    Difficult airway for intubation T88. 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 1XXA    Impaired mobility and ADLs Z74.09    History of Coumadin therapy Z79.01    Gout M10.9    Acute paraplegia (HCC) G82.20    Sepsis ([address] A41.9    Psoas abscess, right ([address] K68.12    Krueger catheter in place on admission Z96.0    Urinary tract infection due to Enterococcus N39.0, B95.2    Group B streptococcal infection A49.1    Hypervolemia E87.70    Anemia D64.9 ·         PLAN:/DISCUSSION:   · Dc if scan nicole Amato MD  6/15/2017, 6:28 AM

## 2017-06-15 NOTE — CONSULTS
Asked to see this patient again to review current situation by Infectious disease    78 yo F DM with group b step abscess, bacteremia, psoas abscess,, spinal cord infarct with paraparesis. Currently says she is having episodes of leg spasms to knee. Random. No back pain. Pain in leg cramp like. Some worse on right leg than left leg. PE afebrile  Feels well  Flaccid paralysis BLE incomplete with slight movement of toes. can turn and twist trunk without back pain  Upper extremities normal    Radiographic studies    Reviewed ct of 6/8 and nuclear med study of 6/14  Ct without evidence of bone infection. Residual swelling of right psoas is present. Nuc med study with probable degenerative change in l2/3 facet- unimpressive especially as paitient without mechanical back pain on movement    Labs  Wbc normal    AP    I am not impressed that there is evidence of active spinal infection. I believe current symptoms are residual of spinal cord injury and recovery, as well as plexus injury in the right psoas. Certainly patient has masked infection in the past and it is impossible to be certain. Agree with apparent current discharge plan.

## 2017-06-15 NOTE — PROGRESS NOTES
Bedside and Verbal shift change report given to April RN (oncoming nurse) by Júnior David RN   (offgoing nurse). Report given with SBAR, Kardex, MAR and Recent Results.

## 2017-06-15 NOTE — PROGRESS NOTES
Bedside shift change report given to Zamzam Oliveira RN (oncoming nurse) by DANIEL Guerra (offgoing nurse). Report included the following information SBAR, Kardex, Procedure Summary, Intake/Output, MAR and Recent Results.

## 2017-06-16 LAB
GLUCOSE BLD STRIP.AUTO-MCNC: 118 MG/DL (ref 70–110)
GLUCOSE BLD STRIP.AUTO-MCNC: 171 MG/DL (ref 70–110)
GLUCOSE BLD STRIP.AUTO-MCNC: 244 MG/DL (ref 70–110)
GLUCOSE BLD STRIP.AUTO-MCNC: 282 MG/DL (ref 70–110)

## 2017-06-16 PROCEDURE — 74011000258 HC RX REV CODE- 258: Performed by: FAMILY MEDICINE

## 2017-06-16 PROCEDURE — 65660000000 HC RM CCU STEPDOWN

## 2017-06-16 PROCEDURE — 74011250636 HC RX REV CODE- 250/636: Performed by: FAMILY MEDICINE

## 2017-06-16 PROCEDURE — 74011250637 HC RX REV CODE- 250/637: Performed by: FAMILY MEDICINE

## 2017-06-16 PROCEDURE — 74011636637 HC RX REV CODE- 636/637: Performed by: FAMILY MEDICINE

## 2017-06-16 PROCEDURE — 82962 GLUCOSE BLOOD TEST: CPT

## 2017-06-16 RX ORDER — UREA 10 %
1 LOTION (ML) TOPICAL 2 TIMES DAILY
Qty: 60 TAB | Refills: 0 | Status: SHIPPED | OUTPATIENT
Start: 2017-06-16 | End: 2018-12-21

## 2017-06-16 RX ORDER — INSULIN LISPRO 100 [IU]/ML
INJECTION, SOLUTION INTRAVENOUS; SUBCUTANEOUS
Qty: 1 VIAL | Refills: 0 | Status: ON HOLD
Start: 2017-06-16 | End: 2019-12-16

## 2017-06-16 RX ORDER — POTASSIUM CHLORIDE 20 MEQ/1
20 TABLET, EXTENDED RELEASE ORAL 2 TIMES DAILY
Qty: 30 TAB | Refills: 0 | Status: SHIPPED | OUTPATIENT
Start: 2017-06-16 | End: 2018-10-23

## 2017-06-16 RX ORDER — FUROSEMIDE 40 MG/1
TABLET ORAL
Qty: 60 TAB | Refills: 0 | Status: SHIPPED | OUTPATIENT
Start: 2017-06-16 | End: 2018-12-17 | Stop reason: SDUPTHER

## 2017-06-16 RX ORDER — LANOLIN ALCOHOL/MO/W.PET/CERES
400 CREAM (GRAM) TOPICAL 2 TIMES DAILY
Qty: 60 TAB | Refills: 0 | Status: SHIPPED | OUTPATIENT
Start: 2017-06-16 | End: 2018-05-22

## 2017-06-16 RX ORDER — GABAPENTIN 300 MG/1
600 CAPSULE ORAL 2 TIMES DAILY
Qty: 100 CAP | Refills: 0 | Status: SHIPPED | OUTPATIENT
Start: 2017-06-16

## 2017-06-16 RX ORDER — DIPHENHYDRAMINE HCL 25 MG
25 CAPSULE ORAL 2 TIMES DAILY
Qty: 60 CAP | Refills: 0 | Status: SHIPPED | OUTPATIENT
Start: 2017-06-16 | End: 2017-06-26

## 2017-06-16 RX ORDER — OXYCODONE AND ACETAMINOPHEN 7.5; 325 MG/1; MG/1
1 TABLET ORAL
Qty: 60 TAB | Refills: 0 | Status: SHIPPED | OUTPATIENT
Start: 2017-06-16 | End: 2018-03-12

## 2017-06-16 RX ORDER — DIPHENHYDRAMINE HCL 25 MG
25 CAPSULE ORAL
Qty: 60 CAP | Refills: 0 | Status: SHIPPED | OUTPATIENT
Start: 2017-06-16 | End: 2017-06-26

## 2017-06-16 RX ORDER — LANOLIN ALCOHOL/MO/W.PET/CERES
325 CREAM (GRAM) TOPICAL 2 TIMES DAILY WITH MEALS
Qty: 100 TAB | Refills: 0 | Status: SHIPPED | OUTPATIENT
Start: 2017-06-16 | End: 2018-12-21

## 2017-06-16 RX ADMIN — INSULIN LISPRO 6 UNITS: 100 INJECTION, SOLUTION INTRAVENOUS; SUBCUTANEOUS at 13:18

## 2017-06-16 RX ADMIN — Medication 20 ML: at 14:00

## 2017-06-16 RX ADMIN — CEFTRIAXONE 2 G: 2 INJECTION, POWDER, FOR SOLUTION INTRAMUSCULAR; INTRAVENOUS at 01:15

## 2017-06-16 RX ADMIN — Medication 10 ML: at 15:00

## 2017-06-16 RX ADMIN — DIPHENHYDRAMINE HYDROCHLORIDE 25 MG: 25 CAPSULE ORAL at 18:39

## 2017-06-16 RX ADMIN — INSULIN LISPRO 4 UNITS: 100 INJECTION, SOLUTION INTRAVENOUS; SUBCUTANEOUS at 22:41

## 2017-06-16 RX ADMIN — MIDODRINE HYDROCHLORIDE 2.5 MG: 2.5 TABLET ORAL at 10:28

## 2017-06-16 RX ADMIN — MIDODRINE HYDROCHLORIDE 2.5 MG: 2.5 TABLET ORAL at 18:40

## 2017-06-16 RX ADMIN — GABAPENTIN 600 MG: 300 CAPSULE ORAL at 10:27

## 2017-06-16 RX ADMIN — Medication 400 MG: at 10:28

## 2017-06-16 RX ADMIN — Medication 20 ML: at 15:00

## 2017-06-16 RX ADMIN — FAMOTIDINE 20 MG: 20 TABLET ORAL at 22:03

## 2017-06-16 RX ADMIN — DIPHENHYDRAMINE HYDROCHLORIDE 25 MG: 25 CAPSULE ORAL at 10:27

## 2017-06-16 RX ADMIN — FOLIC ACID 1 MG: 1 TABLET ORAL at 10:27

## 2017-06-16 RX ADMIN — INSULIN GLARGINE 5 UNITS: 100 INJECTION, SOLUTION SUBCUTANEOUS at 22:39

## 2017-06-16 RX ADMIN — POTASSIUM CHLORIDE 20 MEQ: 20 TABLET, EXTENDED RELEASE ORAL at 10:28

## 2017-06-16 RX ADMIN — POTASSIUM CHLORIDE 20 MEQ: 20 TABLET, EXTENDED RELEASE ORAL at 18:40

## 2017-06-16 RX ADMIN — Medication 400 MG: at 18:40

## 2017-06-16 RX ADMIN — GABAPENTIN 600 MG: 300 CAPSULE ORAL at 18:39

## 2017-06-16 RX ADMIN — Medication 10 ML: at 14:00

## 2017-06-16 RX ADMIN — ALLOPURINOL 50 MG: 100 TABLET ORAL at 10:27

## 2017-06-16 RX ADMIN — FUROSEMIDE 40 MG: 40 TABLET ORAL at 10:27

## 2017-06-16 RX ADMIN — MIDODRINE HYDROCHLORIDE 2.5 MG: 2.5 TABLET ORAL at 13:18

## 2017-06-16 RX ADMIN — LACTOBACILLUS TAB 1 TABLET: TAB at 10:27

## 2017-06-16 RX ADMIN — ACETAMINOPHEN 650 MG: 325 TABLET ORAL at 22:03

## 2017-06-16 RX ADMIN — FERROUS SULFATE TAB 325 MG (65 MG ELEMENTAL FE) 325 MG: 325 (65 FE) TAB at 18:40

## 2017-06-16 RX ADMIN — LACTOBACILLUS TAB 1 TABLET: TAB at 18:39

## 2017-06-16 RX ADMIN — FERROUS SULFATE TAB 325 MG (65 MG ELEMENTAL FE) 325 MG: 325 (65 FE) TAB at 10:28

## 2017-06-16 RX ADMIN — INSULIN LISPRO 2 UNITS: 100 INJECTION, SOLUTION INTRAVENOUS; SUBCUTANEOUS at 10:25

## 2017-06-16 RX ADMIN — VITAMIN D, TAB 1000IU (100/BT) 2000 UNITS: 25 TAB at 10:27

## 2017-06-16 NOTE — PROGRESS NOTES
Patient is not available to be assessed at this time.  has stopped by room 5-7 times in 2 days and patient is always asleep.     [address] [facility]   [phone]

## 2017-06-16 NOTE — PROGRESS NOTES
Met with pt who expressed desire to return to [facility] on discharge - (possibly set for today) ; VM message to April Andrew who is  out of building  until after 12 noon this date and entered request in e- discharge  awaiting  a page back. -Ms> Nick Anderson called back has accepted pt and will start B/C auth ASAP and contact me as soon as approval obtained.

## 2017-06-16 NOTE — PROGRESS NOTES
Remain awaiting B/C pre-cert for transfer to SNF, reviewed with daughter,  Melyssa Hodgson, ( with pt's permission)  [address] [facility] 'will call' transport aligned via dispatcher [employer]. Addendum: MsWill Fabiola Lopez , [facility], called CM at 5:00 Pm stating she had not received a response from B/C for pre-cert to re-admit pt after aldovng left 3 messages throughout the afternoon. Insurance is closed over the weekend- she will follow-up on Monday . Pt informed , Dr. Anshul Traore made aware.

## 2017-06-16 NOTE — PROGRESS NOTES
Infectious Disease progress Note    Requested by: Dr. Angelina Crawford    Reason: sepsis    Current abx Prior abx   Ceftriaxone 4/24-5/15; 5/22- till date Fluconazole 5/16-5/30  Meropenem 5/15-5/22  Cefepime 4/20-4/21     Lines:       Assessment :      77 y.o., right handed female, with an established history of hypertension, complete heart block with permanent pacemaker placed, diabetes mellitus, diabetic peripheral neuropathy, obesity, admitted to [facility] on 6/6/2017.      Recent hospitalization (4/20/17-5/15/17) for Sepsis  secondary to group B streptococcus bloodstream infection (positive blood cultures 4/20, negative blood cultures 4/21). Most likely source of bloodstream infection is infected right psoas hematoma/abscess. 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       Enterococcus in urine cultures 4/20 likely colonizer    bilateral LE dvt - s/p IVC filter placement 5/11    Now with low grade fevers, abdominal pain, acute anemia. Highly complex clinical picture. Difficult to determine etiology of patients' symptoms. Differential diagnosis: undiagnosed GI bleed/anemia of chronic disease from spinal osteomyelitis   Persistent upper abdominal discomfort concerning for undiagnosed gastritis/peptic ulcer disease. Will f/u bone scan to rule out spinal infection/radiculopathy as the cause of upper abdominal pain    Abdominal pain and pain radiating down to right leg is likely radiculopathy/neuropathy - significant clinical improvement after initiation of neurontin    Constipation as seen on ct scan may be contributing to abdominal pain. 6x1 cm residual collection right psoas is likely serous fluid - most recent drains only drained serous material. Lack of fevers/increasing wbc/worsening right flank tenderness argues against worsening infection.    Ct findings discussed with  samoilov - no drainable fluid collection in psoas - significant improvement in psoas abnormalities compared to prior ct scan    Bone scan reveals evidence of L2-L3 facet arthritis, osteomyelitis. Spine surgery evaluation appreciated. No evidence of worsening infection clinically to warrant surgical intervention. Infection seems controlled. Imaging studies likely reveal residual changes from recent infection. patient doesn't have erythema at the site of pacemaker. No fluid collection noted at pacemaker pocket site per CT scan 6/7/17 - discussed with radiologist. At this time risk of removal of pacemaker/general pocket change exceed the benefit.        No evidence of pneumonia. No significant pyuria.      No leukocytosis        Recommendations:     1. continue ceftriaxone till 6/30/17  2. Remove picc line once antibiotics completed  3. Outpatient GI follow up for anemia, r/o gi bleed     Above plan was discussed in details with patient, dr. Kb Gonzales. Please call me if any further questions or concerns. Will continue to participate in the care of this patient. subjective:    Feels better . Still with upper abdominal pain. improved right leg pain/flank pain. No fever, chills, diarrhea. Patient denies headaches, visual disturbances, sore throat, runny nose, earaches, cp, sob, chills, cough, diarrhea, burning micturition, increased pain or weakness in extremities. Home Medication List    Details   oxyCODONE-acetaminophen (PERCOCET 7.5) 7.5-325 mg per tablet Take 1 Tab by mouth every six (6) hours as needed (for pain level greater than 5/10). Max Daily Amount: 4 Tabs. Indications: Pain  Qty: 20 Tab, Refills: 0      albuterol-ipratropium (DUO-NEB) 2.5 mg-0.5 mg/3 ml nebu 3 mL by Nebulization route four (4) times daily. Qty: 30 Nebule, Refills: 0      allopurinol (ZYLOPRIM) 100 mg tablet Take 0.5 Tabs by mouth daily.  Indications: HYPERURICEMIA  Qty: 30 Tab, Refills: 0      cefTRIAXone 2 gram 2 g IVPB 2 g by IntraVENous route every twenty-four (24) hours for 20 days. Qty: 20 Dose, Refills: 0      midodrine (PROAMITINE) 2.5 mg tablet 5 mg [2 tablets] po three time a day for 5 days then 2.5 mg po three time a day x 5 days then stop  Qty: 45 Tab, Refills: 0      Lactobacillus Acidoph & Bulgar (FLORANEX) 1 million cell tab tablet Take 1 Tab by mouth two (2) times a day for 21 days. Qty: 42 Tab, Refills: 0      aluminum-magnesium hydroxide (MAALOX) 200-200 mg/5 mL suspension Take 15 mL by mouth four (4) times daily as needed for Indigestion. Qty: 100 mL, Refills: 0      folic acid (FOLVITE) 1 mg tablet Take 1 Tab by mouth daily. Qty: 30 Tab, Refills: 0      insulin glargine (LANTUS) 100 unit/mL injection 5 Units by SubCUTAneous route nightly. Indications: type 2 diabetes mellitus  Qty: 1 Vial, Refills: 0    Associated Diagnoses: Type 2 diabetes mellitus with diabetic neuropathy, with long-term current use of insulin (HCC)      ferrous sulfate 325 mg (65 mg iron) tablet Take 1 Tab by mouth two (2) times daily (with meals) for 14 days. Qty: 28 Tab, Refills: 0      famotidine (PEPCID) 20 mg tablet Take 1 Tab by mouth nightly. Qty: 30 Tab, Refills: 0      polyethylene glycol (MIRALAX) 17 gram packet Take 1 Packet by mouth two (2) times a day. Qty: 30 Packet, Refills: 0      cholecalciferol (VITAMIN D3) 1,000 unit tablet Take 2 Tabs by mouth daily. Indications: PREVENTION OF VITAMIN D DEFICIENCY  Qty: 30 Tab, Refills: 0      ondansetron (ZOFRAN ODT) 4 mg disintegrating tablet Take 4 mg by mouth every eight (8) hours as needed for Nausea. acetaminophen (TYLENOL) 325 mg tablet Take 2 Tabs by mouth every four (4) hours as needed (for fever or pain level less than 5/10). Indications: Fever, Pain  Qty: 30 Tab, Refills: 0    Associated Diagnoses: Iliopsoas muscle hematoma, right, subsequent encounter      senna-docusate (PERICOLACE) 8.6-50 mg per tablet Take 2 Tabs by mouth daily (after dinner).  Indications: Constipation  Qty: 30 Tab, Refills: 0      pravastatin (PRAVACHOL) 40 mg tablet Take 40 mg by mouth nightly.  Indications: DYSLIPIDEMIA      insulin aspart (NOVOLOG) 100 unit/mL injection INITIATE INSULIN CORRECTIVE PROTOCOL (HR): Normal Insulin Sensitivity  For Blood Sugar (mg/dL) of:    Less than 150 =   0 units          150 -199 =   2 units 200 -249 =   4 units 250 -299 =   6 units 300 -349 =   8 units 350 and above =   10 units If 2 glucose readings are above 200 mg/dL  Qty: 10 mL, Refills: 6             Current Facility-Administered Medications   Medication Dose Route Frequency    furosemide (LASIX) tablet 40 mg  40 mg Oral DAILY    diphenhydrAMINE (BENADRYL) capsule 25 mg  25 mg Oral Q6H PRN    gabapentin (NEURONTIN) capsule 600 mg  600 mg Oral BID    sodium chloride (NS) flush 10-30 mL  10-30 mL InterCATHeter PRN    sodium chloride (NS) flush 10 mL  10 mL InterCATHeter Q24H    sodium chloride (NS) flush 10 mL  10 mL InterCATHeter PRN    sodium chloride (NS) flush 10-40 mL  10-40 mL InterCATHeter Q8H    sodium chloride (NS) flush 20 mL  20 mL InterCATHeter Q24H    alteplase (CATHFLO) 1 mg in sterile water (preservative free) 1 mL injection  1 mg InterCATHeter PRN    potassium chloride (K-DUR, KLOR-CON) SR tablet 20 mEq  20 mEq Oral BID    ondansetron (ZOFRAN ODT) tablet 4 mg  4 mg Oral Q8H PRN    acetaminophen (TYLENOL) tablet 650 mg  650 mg Oral Q4H PRN    senna-docusate (PERICOLACE) 8.6-50 mg per tablet 2 Tab  2 Tab Oral PCD    cholecalciferol (VITAMIN D3) tablet 2,000 Units  2,000 Units Oral DAILY    oxyCODONE-acetaminophen (PERCOCET 7.5) 7.5-325 mg per tablet 1 Tab  1 Tab Oral Q6H PRN    allopurinol (ZYLOPRIM) tablet 50 mg  50 mg Oral DAILY    midodrine (PROAMITINE) tablet 2.5 mg  2.5 mg Oral TID WITH MEALS    Lactobacillus Acidoph & Bulgar (FLORANEX) tablet 1 Tab  1 Tab Oral BID    folic acid (FOLVITE) tablet 1 mg  1 mg Oral DAILY    insulin glargine (LANTUS) injection 5 Units  5 Units SubCUTAneous QHS    ferrous sulfate tablet 325 mg  325 mg Oral BID WITH MEALS    famotidine (PEPCID) tablet 20 mg  20 mg Oral QHS    polyethylene glycol (MIRALAX) packet 17 g  17 g Oral BID    cefTRIAXone (ROCEPHIN) 2 g in 0.9% sodium chloride (MBP/ADV) 50 mL MBP  2 g IntraVENous Q24H    aluminum-magnesium hydroxide (MAALOX) oral suspension 15 mL  15 mL Oral QID PRN    sodium chloride (NS) flush 5-10 mL  5-10 mL IntraVENous Q8H    sodium chloride (NS) flush 5-10 mL  5-10 mL IntraVENous PRN    albuterol-ipratropium (DUO-NEB) 2.5 MG-0.5 MG/3 ML  3 mL Nebulization Q4H PRN    magnesium oxide (MAG-OX) tablet 400 mg  400 mg Oral BID    diphenhydrAMINE (BENADRYL) capsule 25 mg  25 mg Oral BID    insulin lispro (HUMALOG) injection   SubCUTAneous AC&HS    glucose chewable tablet 16 g  16 g Oral PRN    glucagon (GLUCAGEN) injection 1 mg  1 mg IntraMUSCular PRN    dextrose (D50W) injection syrg 12.5-25 g  25-50 mL IntraVENous PRN    0.9% sodium chloride infusion 250 mL  250 mL IntraVENous PRN       Allergies: Vancomycin; Ampicillin; Bactrim [sulfamethoxazole-trimethoprim]; Blueberry; Ciprofloxacin; Codeine; Crestor [rosuvastatin]; Darvocet a500 [propoxyphene n-acetaminophen]; Demerol [meperidine]; Levaquin [levofloxacin]; Lipitor [atorvastatin]; Magnesium oxide; Minocin [minocycline]; Pcn [penicillins]; Pravachol [pravastatin]; Shellfish derived; Sulfa (sulfonamide antibiotics); Ultracet [tramadol-acetaminophen]; Vicodin [hydrocodone-acetaminophen]; Vytorin 10-10 [ezetimibe-simvastatin]; and Percodan [oxycodone hcl-oxycodone-asa]    ROS: 12 point ROS obtained in details.  Pertinent positives as mentioned in HPI,   otherwise negative    Physical Exam:    General: Well developed, well nourished female laying on the bed, AAOx3 NAD     General:  awake alert and oriented   HEENT:  Normocephalic, atraumatic, PERRL, EOMI, no scleral icterus or pallor; no conjunctival hemmohage; nasal and oral mucous are moist and without evidence of lesions. Neck supple, no bruits. Lymph Nodes:  no cervical, axillary or inguinal adenopathy   Lungs:  non-labored, bilaterally clear to auscultation- no crackles wheezes rales or rhonchi   Heart:  s1 and s2 irregular; no rubs or gallops, no edema, + pedal pulses   Abdomen: soft, non-distended, active bowel sounds, no hepatomegaly, no splenomegaly. no tenderness over the left abdominal pacemaker, no overlying erythema or fluctuance, present epigastric/RUQ tenderness   Genitourinary: Krueger in place   Extremities:  no clubbing, cyanosis; no joint effusions or swelling; muscle mass appropriate for age   Neurologic:  No gross focal sensory abnormalities; 5/5 muscle strength to upper extremities. 0/5 strength in lower extremities. Cranial nerves intact   Skin:  Surgical scars abdomen, left neck well healed   Back: no paraspinal muscle guarding or rigidity, resolved right CVA tenderness, tenderness over lumbar spine around L4-L5   Psychiatric:  No suicidal or homicidal ideations, appropriate mood and affect          Labs: Results:   Chemistry Recent Labs      06/15/17   2035   GLU  194*   NA  138   K  4.0   CL  102   CO2  28   BUN  19*   CREA  0.96   CA  8.0*   AGAP  8   BUCR  20      CBC w/Diff Recent Labs      06/15/17   1130   WBC  6.2   RBC  3.61*   HGB  9.6*   HCT  29.8*   PLT  241   GRANS  61   LYMPH  29   EOS  2      Microbiology No results for input(s): CULT in the last 72 hours.        RADIOLOGY:    All available imaging studies/reports in [facility] for this admission were reviewed    Dr. Viktoria Smith, Infectious Disease Specialist  [phone]  June 16, 2017  8:43 AM

## 2017-06-16 NOTE — ROUTINE PROCESS
Bedside shift change report given to Radha Wu RN (oncoming nurse) by DANIEL Guerra (offgoing nurse). Report included the following information SBAR, Kardex, Intake/Output, MAR and Recent Results.

## 2017-06-17 LAB
GLUCOSE BLD STRIP.AUTO-MCNC: 135 MG/DL (ref 70–110)
GLUCOSE BLD STRIP.AUTO-MCNC: 154 MG/DL (ref 70–110)
GLUCOSE BLD STRIP.AUTO-MCNC: 206 MG/DL (ref 70–110)
GLUCOSE BLD STRIP.AUTO-MCNC: 283 MG/DL (ref 70–110)

## 2017-06-17 PROCEDURE — 74011250637 HC RX REV CODE- 250/637: Performed by: FAMILY MEDICINE

## 2017-06-17 PROCEDURE — 77030033263 HC DRSG MEPILEX 16-48IN BORD MOLN -B

## 2017-06-17 PROCEDURE — 74011636637 HC RX REV CODE- 636/637: Performed by: FAMILY MEDICINE

## 2017-06-17 PROCEDURE — 74011250636 HC RX REV CODE- 250/636: Performed by: FAMILY MEDICINE

## 2017-06-17 PROCEDURE — 74011000258 HC RX REV CODE- 258: Performed by: FAMILY MEDICINE

## 2017-06-17 PROCEDURE — 87045 FECES CULTURE AEROBIC BACT: CPT | Performed by: FAMILY MEDICINE

## 2017-06-17 PROCEDURE — 82962 GLUCOSE BLOOD TEST: CPT

## 2017-06-17 PROCEDURE — 65660000000 HC RM CCU STEPDOWN

## 2017-06-17 RX ORDER — GABAPENTIN 300 MG/1
600 CAPSULE ORAL 3 TIMES DAILY
Status: DISCONTINUED | OUTPATIENT
Start: 2017-06-17 | End: 2017-06-22 | Stop reason: HOSPADM

## 2017-06-17 RX ADMIN — Medication 10 ML: at 00:00

## 2017-06-17 RX ADMIN — LOPERAMIDE HYDROCHLORIDE 2 MG: 2 SOLUTION ORAL at 18:34

## 2017-06-17 RX ADMIN — VITAMIN D, TAB 1000IU (100/BT) 2000 UNITS: 25 TAB at 10:27

## 2017-06-17 RX ADMIN — INSULIN LISPRO 6 UNITS: 100 INJECTION, SOLUTION INTRAVENOUS; SUBCUTANEOUS at 21:19

## 2017-06-17 RX ADMIN — GABAPENTIN 600 MG: 300 CAPSULE ORAL at 16:22

## 2017-06-17 RX ADMIN — INSULIN LISPRO 4 UNITS: 100 INJECTION, SOLUTION INTRAVENOUS; SUBCUTANEOUS at 12:31

## 2017-06-17 RX ADMIN — GABAPENTIN 600 MG: 300 CAPSULE ORAL at 21:18

## 2017-06-17 RX ADMIN — FERROUS SULFATE TAB 325 MG (65 MG ELEMENTAL FE) 325 MG: 325 (65 FE) TAB at 10:26

## 2017-06-17 RX ADMIN — Medication 10 ML: at 21:23

## 2017-06-17 RX ADMIN — FAMOTIDINE 20 MG: 20 TABLET ORAL at 21:18

## 2017-06-17 RX ADMIN — INSULIN GLARGINE 5 UNITS: 100 INJECTION, SOLUTION SUBCUTANEOUS at 21:19

## 2017-06-17 RX ADMIN — CEFTRIAXONE 2 G: 2 INJECTION, POWDER, FOR SOLUTION INTRAMUSCULAR; INTRAVENOUS at 04:33

## 2017-06-17 RX ADMIN — DIPHENHYDRAMINE HYDROCHLORIDE 25 MG: 25 CAPSULE ORAL at 10:27

## 2017-06-17 RX ADMIN — Medication 10 ML: at 16:29

## 2017-06-17 RX ADMIN — Medication 10 ML: at 21:10

## 2017-06-17 RX ADMIN — Medication 10 ML: at 22:00

## 2017-06-17 RX ADMIN — GABAPENTIN 600 MG: 300 CAPSULE ORAL at 10:26

## 2017-06-17 RX ADMIN — Medication 400 MG: at 10:28

## 2017-06-17 RX ADMIN — FOLIC ACID 1 MG: 1 TABLET ORAL at 10:28

## 2017-06-17 RX ADMIN — FUROSEMIDE 40 MG: 40 TABLET ORAL at 10:27

## 2017-06-17 RX ADMIN — Medication 10 ML: at 16:28

## 2017-06-17 RX ADMIN — MIDODRINE HYDROCHLORIDE 2.5 MG: 2.5 TABLET ORAL at 16:22

## 2017-06-17 RX ADMIN — DIPHENHYDRAMINE HYDROCHLORIDE 25 MG: 25 CAPSULE ORAL at 18:35

## 2017-06-17 RX ADMIN — MIDODRINE HYDROCHLORIDE 2.5 MG: 2.5 TABLET ORAL at 10:28

## 2017-06-17 RX ADMIN — Medication 10 ML: at 10:29

## 2017-06-17 RX ADMIN — LACTOBACILLUS TAB 1 TABLET: TAB at 10:26

## 2017-06-17 RX ADMIN — FERROUS SULFATE TAB 325 MG (65 MG ELEMENTAL FE) 325 MG: 325 (65 FE) TAB at 16:22

## 2017-06-17 RX ADMIN — Medication 20 ML: at 16:29

## 2017-06-17 RX ADMIN — POTASSIUM CHLORIDE 20 MEQ: 20 TABLET, EXTENDED RELEASE ORAL at 10:26

## 2017-06-17 RX ADMIN — ALLOPURINOL 50 MG: 100 TABLET ORAL at 10:26

## 2017-06-17 RX ADMIN — INSULIN LISPRO 2 UNITS: 100 INJECTION, SOLUTION INTRAVENOUS; SUBCUTANEOUS at 10:29

## 2017-06-17 RX ADMIN — Medication 400 MG: at 18:44

## 2017-06-17 RX ADMIN — POTASSIUM CHLORIDE 20 MEQ: 20 TABLET, EXTENDED RELEASE ORAL at 18:35

## 2017-06-17 RX ADMIN — MIDODRINE HYDROCHLORIDE 2.5 MG: 2.5 TABLET ORAL at 12:31

## 2017-06-17 RX ADMIN — LACTOBACILLUS TAB 1 TABLET: TAB at 18:35

## 2017-06-17 NOTE — PROGRESS NOTES
Myrna Flores M.D. PROGRESS NOTE    Name: Rene Claire MRN: [id number]   : 1950 Hospital: [facility] [place]   Date: 2017  Admission Date: 2017     Subjective/Objective/Plans    Loose stools  On rocephin, could be relaled to antibiotics    Plan  Immodium  Increase Neurontin dose for added relief to Neuropathic pain, watch for sedation  Stool culture      Vital Signs:  Visit Vitals    /80 (BP 1 Location: Left arm, BP Patient Position: At rest)    Pulse 85    Temp 97.5 °F (36.4 °C)    Resp 18    Wt 106.5 kg (234 lb 14.4 oz)    SpO2 100%    Breastfeeding No    BMI 36.79 kg/m2       O2 Device: Room air       Temp (24hrs), Av.8 °F (36.6 °C), Min:97.2 °F (36.2 °C), Max:98.8 °F (37.1 °C)     10:47 AM  Intake/Output:   Last shift:         Last 3 shifts: 06/15 1901 -  0700  In: 360 [P.O.:360]  Out: 3250 [Urine:3250]    Intake/Output Summary (Last 24 hours) at 17 1047  Last data filed at 17 0630   Gross per 24 hour   Intake              360 ml   Output             1600 ml   Net            -1240 ml         Physical Exam:    General: in no apparent distress    HEENT: pupils equal, no ear discharge   Neck: No abnormally enlarged lymph nodes. , no JDV   Mouth: MMM no lesions    Chest: normal, no breast masses   Lungs: normal air entry   Heart: Regular rate and rhythm   Abdomen: abdomen is soft without significant tenderness, masses, organomegaly or guarding   Extremity: paraplegia   Neuro: alert    Skin: Skin color, texture, turgor normal. No rashes or lesions    Data Review:    Labs: Results:       Chemistry Recent Labs      06/15/17   2035   GLU  194*   NA  138   K  4.0   CL  102   CO2  28   BUN  19*   CREA  0.96   CA  8.0*   AGAP  8   BUCR  20      CBC w/Diff Recent Labs      06/15/17   1130   WBC  6.2   RBC  3.61*   HGB  9.6*   HCT  29.8*   PLT  241   GRANS  61   LYMPH  29   EOS  2      Cardiac Enzymes No results for input(s): CPK, CKND1, CARLI in the last 72 hours.    No lab exists for component: CKRMB, TROIP   Coagulation No results for input(s): PTP, INR, APTT in the last 72 hours. No lab exists for component: INREXT    Lipid Panel Lab Results   Component Value Date/Time    Cholesterol, total 154 07/24/2012 01:00 AM    HDL Cholesterol 48 07/24/2012 01:00 AM    LDL, calculated 90.4 07/24/2012 01:00 AM    VLDL, calculated 15.6 07/24/2012 01:00 AM    Triglyceride 78 07/24/2012 01:00 AM    CHOL/HDL Ratio 3.2 07/24/2012 01:00 AM      BNP No results for input(s): BNPP in the last 72 hours. Liver Enzymes No results for input(s): TP, ALB, TBILI, AP, SGOT, ALT, CBIL in the last 72 hours. Thyroid Studies Lab Results   Component Value Date/Time    TSH 0.51 04/20/2017 07:26 PM          Procedures/imaging: see electronic medical records for all procedures, Xrays and details which were not copied into this note but were reviewed. Allergies: Allergies   Allergen Reactions    Vancomycin Itching    Ampicillin Itching    Bactrim [Sulfamethoxazole-Trimethoprim] Unknown (comments)    Blueberry Swelling     Causes throat swelling    Ciprofloxacin Itching    Codeine Other (comments)     Jumpy feeling    Crestor [Rosuvastatin] Itching    Darvocet A500 [Propoxyphene N-Acetaminophen] Itching    Demerol [Meperidine] Itching    Levaquin [Levofloxacin] Itching    Lipitor [Atorvastatin] Myalgia    Magnesium Oxide Itching     nausea    Minocin [Minocycline] Unknown (comments)    Pcn [Penicillins] Itching    Pravachol [Pravastatin] Swelling     Swelling in mouth     Shellfish Derived Unknown (comments)    Sulfa (Sulfonamide Antibiotics) Itching    Ultracet [Tramadol-Acetaminophen] Itching    Vicodin [Hydrocodone-Acetaminophen] Unknown (comments)    Vytorin 10-10 [Ezetimibe-Simvastatin] Myalgia    Percodan [Oxycodone Hcl-Oxycodone-Asa] Itching       Home Medications:  Prior to Admission Medications   Prescriptions Last Dose Informant Patient Reported? Taking? Lactobacillus Acidoph & Bulgar (FLORANEX) 1 million cell tab tablet   No No   Sig: Take 1 Tab by mouth two (2) times a day for 21 days. acetaminophen (TYLENOL) 325 mg tablet   No No   Sig: Take 2 Tabs by mouth every four (4) hours as needed (for fever or pain level less than 5/10). Indications: Fever, Pain   albuterol-ipratropium (DUO-NEB) 2.5 mg-0.5 mg/3 ml nebu   No No   Sig: 3 mL by Nebulization route four (4) times daily. allopurinol (ZYLOPRIM) 100 mg tablet   No No   Sig: Take 0.5 Tabs by mouth daily. Indications: HYPERURICEMIA   aluminum-magnesium hydroxide (MAALOX) 200-200 mg/5 mL suspension   No No   Sig: Take 15 mL by mouth four (4) times daily as needed for Indigestion. cefTRIAXone 2 gram 2 g IVPB   No No   Si g by IntraVENous route every twenty-four (24) hours for 20 days. cholecalciferol (VITAMIN D3) 1,000 unit tablet   No No   Sig: Take 2 Tabs by mouth daily. Indications: PREVENTION OF VITAMIN D DEFICIENCY   famotidine (PEPCID) 20 mg tablet   No No   Sig: Take 1 Tab by mouth nightly. ferrous sulfate 325 mg (65 mg iron) tablet   No No   Sig: Take 1 Tab by mouth two (2) times daily (with meals) for 14 days. folic acid (FOLVITE) 1 mg tablet   No No   Sig: Take 1 Tab by mouth daily. insulin aspart (NOVOLOG) 100 unit/mL injection   No No   Sig: INITIATE INSULIN CORRECTIVE PROTOCOL (HR): Normal Insulin Sensitivity  For Blood Sugar (mg/dL) of:    Less than 150 =   0 units          150 -199 =   2 units 200 -249 =   4 units 250 -299 =   6 units 300 -349 =   8 units 350 and above =   10 units If 2 glucose readings are above 200 mg/dL   insulin glargine (LANTUS) 100 unit/mL injection   No No   Si Units by SubCUTAneous route nightly.  Indications: type 2 diabetes mellitus   midodrine (PROAMITINE) 2.5 mg tablet   No No   Si mg [2 tablets] po three time a day for 5 days then 2.5 mg po three time a day x 5 days then stop   ondansetron (ZOFRAN ODT) 4 mg disintegrating tablet   Yes No   Sig: Take 4 mg by mouth every eight (8) hours as needed for Nausea. oxyCODONE-acetaminophen (PERCOCET 7.5) 7.5-325 mg per tablet   No No   Sig: Take 1 Tab by mouth every six (6) hours as needed (for pain level greater than 5/10). Max Daily Amount: 4 Tabs. Indications: Pain   polyethylene glycol (MIRALAX) 17 gram packet   No No   Sig: Take 1 Packet by mouth two (2) times a day. pravastatin (PRAVACHOL) 40 mg tablet   Yes No   Sig: Take 40 mg by mouth nightly. Indications: DYSLIPIDEMIA   senna-docusate (PERICOLACE) 8.6-50 mg per tablet   No No   Sig: Take 2 Tabs by mouth daily (after dinner).  Indications: Constipation      Facility-Administered Medications: None       Current Medications:  Current Facility-Administered Medications   Medication Dose Route Frequency    loperamide (IMODIUM) 1 mg/5 mL oral solution 2 mg  2 mg Oral QID PRN    gabapentin (NEURONTIN) capsule 600 mg  600 mg Oral TID    furosemide (LASIX) tablet 40 mg  40 mg Oral DAILY    diphenhydrAMINE (BENADRYL) capsule 25 mg  25 mg Oral Q6H PRN    sodium chloride (NS) flush 10-30 mL  10-30 mL InterCATHeter PRN    sodium chloride (NS) flush 10 mL  10 mL InterCATHeter Q24H    sodium chloride (NS) flush 10 mL  10 mL InterCATHeter PRN    sodium chloride (NS) flush 10-40 mL  10-40 mL InterCATHeter Q8H    sodium chloride (NS) flush 20 mL  20 mL InterCATHeter Q24H    alteplase (CATHFLO) 1 mg in sterile water (preservative free) 1 mL injection  1 mg InterCATHeter PRN    potassium chloride (K-DUR, KLOR-CON) SR tablet 20 mEq  20 mEq Oral BID    ondansetron (ZOFRAN ODT) tablet 4 mg  4 mg Oral Q8H PRN    acetaminophen (TYLENOL) tablet 650 mg  650 mg Oral Q4H PRN    senna-docusate (PERICOLACE) 8.6-50 mg per tablet 2 Tab  2 Tab Oral PCD    cholecalciferol (VITAMIN D3) tablet 2,000 Units  2,000 Units Oral DAILY    oxyCODONE-acetaminophen (PERCOCET 7.5) 7.5-325 mg per tablet 1 Tab  1 Tab Oral Q6H PRN    allopurinol (ZYLOPRIM) tablet 50 mg  50 mg Oral DAILY  midodrine (PROAMITINE) tablet 2.5 mg  2.5 mg Oral TID WITH MEALS    Lactobacillus Acidoph & Bulgar [facility]) tablet 1 Tab  1 Tab Oral BID    folic acid (FOLVITE) tablet 1 mg  1 mg Oral DAILY    insulin glargine (LANTUS) injection 5 Units  5 Units SubCUTAneous QHS    ferrous sulfate tablet 325 mg  325 mg Oral BID WITH MEALS    famotidine (PEPCID) tablet 20 mg  20 mg Oral QHS    polyethylene glycol (MIRALAX) packet 17 g  17 g Oral BID    cefTRIAXone (ROCEPHIN) 2 g in 0.9% sodium chloride (MBP/ADV) 50 mL MBP  2 g IntraVENous Q24H    aluminum-magnesium hydroxide (MAALOX) oral suspension 15 mL  15 mL Oral QID PRN    sodium chloride (NS) flush 5-10 mL  5-10 mL IntraVENous Q8H    sodium chloride (NS) flush 5-10 mL  5-10 mL IntraVENous PRN    albuterol-ipratropium (DUO-NEB) 2.5 MG-0.5 MG/3 ML  3 mL Nebulization Q4H PRN    magnesium oxide (MAG-OX) tablet 400 mg  400 mg Oral BID    diphenhydrAMINE (BENADRYL) capsule 25 mg  25 mg Oral BID    insulin lispro (HUMALOG) injection   SubCUTAneous AC&HS    glucose chewable tablet 16 g  16 g Oral PRN    glucagon (GLUCAGEN) injection 1 mg  1 mg IntraMUSCular PRN    dextrose (D50W) injection syrg 12.5-25 g  25-50 mL IntraVENous PRN    0.9% sodium chloride infusion 250 mL  250 mL IntraVENous PRN       Chart and notes reviewed. Data reviewed. I have evaluated and examined the patient. IMPRESSION:   Patient Active Problem List   Diagnosis Code    Nonischemic cardiomyopathy ([facility] I42.8    History of complete heart block Z86.79    Biventricular implantable cardioverter-defibrillator in situ Z95.810    Left bundle branch block (LBBB) on electrocardiogram I44.7    Type 2 diabetes mellitus with diabetic neuropathy (HCC) E11.40    Dyslipidemia E78.5    Diabetic neuropathy associated with type 2 diabetes mellitus ([facility] E11.40    Obstructive sleep apnea on CPAP G47.33, Z99.89    AICD generator infection ([facility] T82. 7XXA    Difficult airway for intubation T88. 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 1XXA    Impaired mobility and ADLs Z74.09    History of Coumadin therapy Z79.01    Gout M10.9    Acute paraplegia (HCC) G82.20    Sepsis ([address] A41.9    Psoas abscess, right ([address] K68.12    Krueger catheter in place on admission Z96.0    Urinary tract infection due to Enterococcus N39.0, B95.2    Group B streptococcal infection A49.1    Hypervolemia E87.70    Anemia D64.9 ·         PLAN:/DISCUSSION:   · See orders        Kylee Rodriguez MD  6/17/2017, 10:47 AM

## 2017-06-18 LAB
GLUCOSE BLD STRIP.AUTO-MCNC: 149 MG/DL (ref 70–110)
GLUCOSE BLD STRIP.AUTO-MCNC: 229 MG/DL (ref 70–110)
GLUCOSE BLD STRIP.AUTO-MCNC: 236 MG/DL (ref 70–110)
GLUCOSE BLD STRIP.AUTO-MCNC: 240 MG/DL (ref 70–110)

## 2017-06-18 PROCEDURE — 82962 GLUCOSE BLOOD TEST: CPT

## 2017-06-18 PROCEDURE — 77030011256 HC DRSG MEPILEX <16IN NO BORD MOLN -A

## 2017-06-18 PROCEDURE — 74011000258 HC RX REV CODE- 258: Performed by: FAMILY MEDICINE

## 2017-06-18 PROCEDURE — 77030033263 HC DRSG MEPILEX 16-48IN BORD MOLN -B

## 2017-06-18 PROCEDURE — 74011636637 HC RX REV CODE- 636/637: Performed by: FAMILY MEDICINE

## 2017-06-18 PROCEDURE — 74011250636 HC RX REV CODE- 250/636: Performed by: FAMILY MEDICINE

## 2017-06-18 PROCEDURE — 74011250637 HC RX REV CODE- 250/637: Performed by: FAMILY MEDICINE

## 2017-06-18 PROCEDURE — 65660000000 HC RM CCU STEPDOWN

## 2017-06-18 RX ADMIN — GABAPENTIN 600 MG: 300 CAPSULE ORAL at 09:44

## 2017-06-18 RX ADMIN — INSULIN GLARGINE 5 UNITS: 100 INJECTION, SOLUTION SUBCUTANEOUS at 21:23

## 2017-06-18 RX ADMIN — GABAPENTIN 600 MG: 300 CAPSULE ORAL at 21:22

## 2017-06-18 RX ADMIN — Medication 10 ML: at 21:27

## 2017-06-18 RX ADMIN — Medication 10 ML: at 18:25

## 2017-06-18 RX ADMIN — POTASSIUM CHLORIDE 20 MEQ: 20 TABLET, EXTENDED RELEASE ORAL at 09:45

## 2017-06-18 RX ADMIN — DIPHENHYDRAMINE HYDROCHLORIDE 25 MG: 25 CAPSULE ORAL at 09:45

## 2017-06-18 RX ADMIN — MIDODRINE HYDROCHLORIDE 2.5 MG: 2.5 TABLET ORAL at 09:45

## 2017-06-18 RX ADMIN — FERROUS SULFATE TAB 325 MG (65 MG ELEMENTAL FE) 325 MG: 325 (65 FE) TAB at 18:24

## 2017-06-18 RX ADMIN — DIPHENHYDRAMINE HYDROCHLORIDE 25 MG: 25 CAPSULE ORAL at 18:25

## 2017-06-18 RX ADMIN — FAMOTIDINE 20 MG: 20 TABLET ORAL at 21:22

## 2017-06-18 RX ADMIN — FOLIC ACID 1 MG: 1 TABLET ORAL at 09:44

## 2017-06-18 RX ADMIN — Medication 400 MG: at 18:24

## 2017-06-18 RX ADMIN — Medication 10 ML: at 05:14

## 2017-06-18 RX ADMIN — INSULIN LISPRO 4 UNITS: 100 INJECTION, SOLUTION INTRAVENOUS; SUBCUTANEOUS at 12:41

## 2017-06-18 RX ADMIN — LACTOBACILLUS TAB 1 TABLET: TAB at 18:24

## 2017-06-18 RX ADMIN — LOPERAMIDE HYDROCHLORIDE 2 MG: 2 SOLUTION ORAL at 09:19

## 2017-06-18 RX ADMIN — MIDODRINE HYDROCHLORIDE 2.5 MG: 2.5 TABLET ORAL at 18:24

## 2017-06-18 RX ADMIN — Medication 400 MG: at 09:45

## 2017-06-18 RX ADMIN — VITAMIN D, TAB 1000IU (100/BT) 2000 UNITS: 25 TAB at 09:45

## 2017-06-18 RX ADMIN — GABAPENTIN 600 MG: 300 CAPSULE ORAL at 18:24

## 2017-06-18 RX ADMIN — POLYETHYLENE GLYCOL 3350 17 G: 17 POWDER, FOR SOLUTION ORAL at 18:24

## 2017-06-18 RX ADMIN — FERROUS SULFATE TAB 325 MG (65 MG ELEMENTAL FE) 325 MG: 325 (65 FE) TAB at 09:45

## 2017-06-18 RX ADMIN — MIDODRINE HYDROCHLORIDE 2.5 MG: 2.5 TABLET ORAL at 12:41

## 2017-06-18 RX ADMIN — POTASSIUM CHLORIDE 20 MEQ: 20 TABLET, EXTENDED RELEASE ORAL at 18:24

## 2017-06-18 RX ADMIN — ALLOPURINOL 50 MG: 100 TABLET ORAL at 09:00

## 2017-06-18 RX ADMIN — LACTOBACILLUS TAB 1 TABLET: TAB at 09:45

## 2017-06-18 RX ADMIN — INSULIN LISPRO 4 UNITS: 100 INJECTION, SOLUTION INTRAVENOUS; SUBCUTANEOUS at 18:30

## 2017-06-18 RX ADMIN — FUROSEMIDE 40 MG: 40 TABLET ORAL at 09:45

## 2017-06-18 RX ADMIN — CEFTRIAXONE 2 G: 2 INJECTION, POWDER, FOR SOLUTION INTRAMUSCULAR; INTRAVENOUS at 00:06

## 2017-06-18 RX ADMIN — INSULIN LISPRO 4 UNITS: 100 INJECTION, SOLUTION INTRAVENOUS; SUBCUTANEOUS at 21:22

## 2017-06-18 NOTE — PROGRESS NOTES
Pricilla Delarosa M.D. PROGRESS NOTE    Name: Ty Hu MRN: [id number]   : 1950 Hospital: [address] [place] [address]   Date: 2017  Admission Date: 2017     Subjective/Objective/Plans  Tolerating Neurontin 600 mg TID  VSS  LBM better with Immodium  Vital Signs:  Visit Vitals    /72 (BP 1 Location: Left arm, BP Patient Position: At rest)    Pulse 86    Temp 98.5 °F (36.9 °C)    Resp 20    Ht 5' 7\" (1.702 m)    Wt 102.8 kg (226 lb 11.2 oz)    SpO2 97%    Breastfeeding No    BMI 35.51 kg/m2       O2 Device: Room air       Temp (24hrs), Av.5 °F (36.9 °C), Min:97.9 °F (36.6 °C), Max:99 °F (37.2 °C)     3:31 PM  Intake/Output:   Last shift:       07 -  1900  In: 240 [P.O.:240]  Out: 475 [Urine:475]  Last 3 shifts:  190 -  0700  In: 970 [P.O.:720; I.V.:250]  Out: 2700 [Urine:2700]    Intake/Output Summary (Last 24 hours) at 17 1531  Last data filed at 17 0954   Gross per 24 hour   Intake              730 ml   Output             1725 ml   Net             -995 ml         Physical Exam:    General: in no apparent distress    HEENT: pupils equal, no ear discharge   Neck: No abnormally enlarged lymph nodes. , no JDV   Mouth: MMM no lesions    Chest: normal, no breast masses   Lungs: normal air entry   Heart: Regular rate and rhythm   Abdomen: abdomen is soft without significant tenderness, masses, organomegaly or guarding   Extremity: paraplegia, having some movements on both LE   Neuro: alert    Skin: Skin color, texture, turgor normal. No rashes or lesions    Data Review:    Labs: Results:       Chemistry Recent Labs      06/15/17   2035   GLU  194*   NA  138   K  4.0   CL  102   CO2  28   BUN  19*   CREA  0.96   CA  8.0*   AGAP  8   BUCR  20      CBC w/Diff No results for input(s): WBC, RBC, HGB, HCT, PLT, GRANS, LYMPH, EOS, HGBEXT, HCTEXT, PLTEXT in the last 72 hours. Cardiac Enzymes No results for input(s): CPK, CKND1, CARLI in the last 72 hours.     No lab exists for component: CKRMB, TROIP   Coagulation No results for input(s): PTP, INR, APTT in the last 72 hours. No lab exists for component: INREXT    Lipid Panel Lab Results   Component Value Date/Time    Cholesterol, total 154 07/24/2012 01:00 AM    HDL Cholesterol 48 07/24/2012 01:00 AM    LDL, calculated 90.4 07/24/2012 01:00 AM    VLDL, calculated 15.6 07/24/2012 01:00 AM    Triglyceride 78 07/24/2012 01:00 AM    CHOL/HDL Ratio 3.2 07/24/2012 01:00 AM      BNP No results for input(s): BNPP in the last 72 hours. Liver Enzymes No results for input(s): TP, ALB, TBILI, AP, SGOT, ALT, CBIL in the last 72 hours. Thyroid Studies Lab Results   Component Value Date/Time    TSH 0.51 04/20/2017 07:26 PM          Procedures/imaging: see electronic medical records for all procedures, Xrays and details which were not copied into this note but were reviewed. Allergies: Allergies   Allergen Reactions    Vancomycin Itching    Ampicillin Itching    Bactrim [Sulfamethoxazole-Trimethoprim] Unknown (comments)    Blueberry Swelling     Causes throat swelling    Ciprofloxacin Itching    Codeine Other (comments)     Jumpy feeling    Crestor [Rosuvastatin] Itching    Darvocet A500 [Propoxyphene N-Acetaminophen] Itching    Demerol [Meperidine] Itching    Levaquin [Levofloxacin] Itching    Lipitor [Atorvastatin] Myalgia    Magnesium Oxide Itching     nausea    Minocin [Minocycline] Unknown (comments)    Pcn [Penicillins] Itching    Pravachol [Pravastatin] Swelling     Swelling in mouth     Shellfish Derived Unknown (comments)    Sulfa (Sulfonamide Antibiotics) Itching    Ultracet [Tramadol-Acetaminophen] Itching    Vicodin [Hydrocodone-Acetaminophen] Unknown (comments)    Vytorin 10-10 [Ezetimibe-Simvastatin] Myalgia    Percodan [Oxycodone Hcl-Oxycodone-Asa] Itching       Home Medications:  Prior to Admission Medications   Prescriptions Last Dose Informant Patient Reported? Taking?    Lactobacillus Acidoph & Bulgar [facility] 1 million cell tab tablet   No No   Sig: Take 1 Tab by mouth two (2) times a day for 21 days. acetaminophen (TYLENOL) 325 mg tablet   No No   Sig: Take 2 Tabs by mouth every four (4) hours as needed (for fever or pain level less than 5/10). Indications: Fever, Pain   albuterol-ipratropium (DUO-NEB) 2.5 mg-0.5 mg/3 ml nebu   No No   Sig: 3 mL by Nebulization route four (4) times daily. allopurinol (ZYLOPRIM) 100 mg tablet   No No   Sig: Take 0.5 Tabs by mouth daily. Indications: HYPERURICEMIA   aluminum-magnesium hydroxide (MAALOX) 200-200 mg/5 mL suspension   No No   Sig: Take 15 mL by mouth four (4) times daily as needed for Indigestion. cefTRIAXone 2 gram 2 g IVPB   No No   Si g by IntraVENous route every twenty-four (24) hours for 20 days. cholecalciferol (VITAMIN D3) 1,000 unit tablet   No No   Sig: Take 2 Tabs by mouth daily. Indications: PREVENTION OF VITAMIN D DEFICIENCY   famotidine (PEPCID) 20 mg tablet   No No   Sig: Take 1 Tab by mouth nightly. ferrous sulfate 325 mg (65 mg iron) tablet   No No   Sig: Take 1 Tab by mouth two (2) times daily (with meals) for 14 days. folic acid (FOLVITE) 1 mg tablet   No No   Sig: Take 1 Tab by mouth daily. insulin aspart (NOVOLOG) 100 unit/mL injection   No No   Sig: INITIATE INSULIN CORRECTIVE PROTOCOL (HR): Normal Insulin Sensitivity  For Blood Sugar (mg/dL) of:    Less than 150 =   0 units          150 -199 =   2 units 200 -249 =   4 units 250 -299 =   6 units 300 -349 =   8 units 350 and above =   10 units If 2 glucose readings are above 200 mg/dL   insulin glargine (LANTUS) 100 unit/mL injection   No No   Si Units by SubCUTAneous route nightly.  Indications: type 2 diabetes mellitus   midodrine (PROAMITINE) 2.5 mg tablet   No No   Si mg [2 tablets] po three time a day for 5 days then 2.5 mg po three time a day x 5 days then stop   ondansetron (ZOFRAN ODT) 4 mg disintegrating tablet   Yes No   Sig: Take 4 mg by mouth every eight (8) hours as needed for Nausea. oxyCODONE-acetaminophen (PERCOCET 7.5) 7.5-325 mg per tablet   No No   Sig: Take 1 Tab by mouth every six (6) hours as needed (for pain level greater than 5/10). Max Daily Amount: 4 Tabs. Indications: Pain   polyethylene glycol (MIRALAX) 17 gram packet   No No   Sig: Take 1 Packet by mouth two (2) times a day. pravastatin (PRAVACHOL) 40 mg tablet   Yes No   Sig: Take 40 mg by mouth nightly. Indications: DYSLIPIDEMIA   senna-docusate (PERICOLACE) 8.6-50 mg per tablet   No No   Sig: Take 2 Tabs by mouth daily (after dinner).  Indications: Constipation      Facility-Administered Medications: None       Current Medications:  Current Facility-Administered Medications   Medication Dose Route Frequency    loperamide (IMODIUM) 1 mg/5 mL oral solution 2 mg  2 mg Oral QID PRN    gabapentin (NEURONTIN) capsule 600 mg  600 mg Oral TID    furosemide (LASIX) tablet 40 mg  40 mg Oral DAILY    diphenhydrAMINE (BENADRYL) capsule 25 mg  25 mg Oral Q6H PRN    sodium chloride (NS) flush 10 mL  10 mL InterCATHeter Q24H    sodium chloride (NS) flush 10 mL  10 mL InterCATHeter PRN    alteplase (CATHFLO) 1 mg in sterile water (preservative free) 1 mL injection  1 mg InterCATHeter PRN    potassium chloride (K-DUR, KLOR-CON) SR tablet 20 mEq  20 mEq Oral BID    ondansetron (ZOFRAN ODT) tablet 4 mg  4 mg Oral Q8H PRN    acetaminophen (TYLENOL) tablet 650 mg  650 mg Oral Q4H PRN    senna-docusate (PERICOLACE) 8.6-50 mg per tablet 2 Tab  2 Tab Oral PCD    cholecalciferol (VITAMIN D3) tablet 2,000 Units  2,000 Units Oral DAILY    oxyCODONE-acetaminophen (PERCOCET 7.5) 7.5-325 mg per tablet 1 Tab  1 Tab Oral Q6H PRN    allopurinol (ZYLOPRIM) tablet 50 mg  50 mg Oral DAILY    midodrine (PROAMITINE) tablet 2.5 mg  2.5 mg Oral TID WITH MEALS    Lactobacillus Acidoph & Bulgar (FLORANEX) tablet 1 Tab  1 Tab Oral BID    folic acid (FOLVITE) tablet 1 mg  1 mg Oral DAILY    insulin glargine (LANTUS) injection 5 Units  5 Units SubCUTAneous QHS    ferrous sulfate tablet 325 mg  325 mg Oral BID WITH MEALS    famotidine (PEPCID) tablet 20 mg  20 mg Oral QHS    polyethylene glycol (MIRALAX) packet 17 g  17 g Oral BID    cefTRIAXone (ROCEPHIN) 2 g in 0.9% sodium chloride (MBP/ADV) 50 mL MBP  2 g IntraVENous Q24H    aluminum-magnesium hydroxide (MAALOX) oral suspension 15 mL  15 mL Oral QID PRN    sodium chloride (NS) flush 5-10 mL  5-10 mL IntraVENous Q8H    albuterol-ipratropium (DUO-NEB) 2.5 MG-0.5 MG/3 ML  3 mL Nebulization Q4H PRN    magnesium oxide (MAG-OX) tablet 400 mg  400 mg Oral BID    diphenhydrAMINE (BENADRYL) capsule 25 mg  25 mg Oral BID    insulin lispro (HUMALOG) injection   SubCUTAneous AC&HS    glucose chewable tablet 16 g  16 g Oral PRN    glucagon (GLUCAGEN) injection 1 mg  1 mg IntraMUSCular PRN    dextrose (D50W) injection syrg 12.5-25 g  25-50 mL IntraVENous PRN    0.9% sodium chloride infusion 250 mL  250 mL IntraVENous PRN       Chart and notes reviewed. Data reviewed. I have evaluated and examined the patient. IMPRESSION:   Patient Active Problem List   Diagnosis Code    [facility] I42.8    History of complete heart block Z86.79    Biventricular implantable cardioverter-defibrillator in situ Z95.810    Left bundle branch block (LBBB) on electrocardiogram I44.7    Type 2 diabetes mellitus with diabetic neuropathy ([facility]) E11.40    Dyslipidemia E78.5    Diabetic neuropathy associated with type 2 diabetes mellitus ([facility] E11.40    Obstructive sleep apnea on CPAP G47.33, Z99.89    AICD generator infection ([facility] T82. 7XXA    Difficult airway for intubation T88. 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 1XXA    Impaired mobility and ADLs Z74.09    History of Coumadin therapy Z79.01    Gout M10.9    Acute paraplegia ([facility]) G82.20    Sepsis ([facility] A41.9    Psoas abscess, right ([facility] K68.12    Krueger catheter in place on admission Z96.0    Urinary tract infection due to Enterococcus N39.0, B95.2    Group B streptococcal infection A49.1    Hypervolemia E87.70    Anemia D64.9 ·         PLAN:/DISCUSSION:   · Roseann 39 in am        Octavia Metzger MD  6/18/2017, 3:31 PM

## 2017-06-18 NOTE — ROUTINE PROCESS
Bedside and Verbal shift change report given to Napoleon Cortez (oncoming nurse) by Rubio Billingsley (offgoing nurse). Report included the following information SBAR, Kardex, MAR and Recent Results. SITUATION:    Code Status: Full Code  Reason for Admission: Anemia  Hypervolemia   Anemia    [facility] day: 15   Problem List:       Hospital Problems  Date Reviewed: 5/24/2017          Codes Class Noted POA    Hypervolemia ICD-10-CM: E87.70  ICD-9-CM: 276.69  6/6/2017 Unknown        Anemia ICD-10-CM: D64.9  ICD-9-CM: 285.9  6/6/2017 Unknown              BACKGROUND:    Past Medical History:   Past Medical History:   Diagnosis Date    Acute paraplegia ([facility] 4/20/2017    Benign hypertensive heart disease with systolic CHF, NYHA class 2 ([facility] 9/5/2012    Biventricular implantable cardioverter-defibrillator in situ 04/28/2005    Upgraded to BiV AICD; gen change 4/2008; pocket revision 10/2009; Abdominal - done on 8/22/2012 by Dr. Sekou Caceres Cardiac cath 08/15/1996    Patent coronaries. Elev LVEDP. EF 50-55%.  Cardiac echocardiogram 06/23/2015    Ltd study. EF 45-50%. Mild, diffuse hypk. Severe apical hypk. No mass or thrombus was clearly identified, although imaging was suboptimal.      Cardiac nuclear imaging test 06/19/2015    Fixed distal apical, distal septal defect more likely due to RV pacing than prior infarct. No ischemia. EF 46%. RWMA c/w RV pacing. Nondiagnostic EKG on pharm stress test.      Cardiovascular lower extremity venous duplex 09/04/2012    Acute, non-occlusive DVT in CFV on right. No DVT on left. No superficial thrombosis bilaterally.  Cardiovascular upper extremity venous duplex 08/27/2012    DVT in axillary vein on left. Left subclavian was not visualized.     Chronic anemia 9/5/2012    Chronic systolic heart failure (HCC)     Decreased calculated glomerular filtration rate (GFR) 3/30/2017    Calculated GFR equivalent to that of CKD stage 3 = 30-59 ml/min    Diabetic neuropathy associated with type 2 diabetes mellitus ([facility] 6/28/2011    Difficult airway for intubation 08/22/2012    see anesthesia airway note    Dyslipidemia 6/28/2011    Gout     History of complete heart block 6/28/2011    History of Coumadin therapy     Anticoagulation for DVT of the LUE; Discontinued on 3/30/2017    History of deep venous thrombosis 9/5/2012    Left upper extremity    History of pyelonephritis 3/30/2017    Left bundle branch block (LBBB) on electrocardiogram 6/28/2011    Nonischemic cardiomyopathy ([address] 6/28/2011    Obesity (BMI 35.0-39.9 without comorbidity) ([address] 3/13/2017    Obstructive sleep apnea on CPAP 2/7/2012    Psoas abscess, right ([address] 4/20/2017    Psoas hematoma, right, secondary to anticoagulant therapy 3/30/2017    Type 2 diabetes mellitus with diabetic neuropathy ([address] 6/28/2011         Patient taking anticoagulants no     ASSESSMENT:    Changes in Assessment Throughout Shift: none     Patient has Central Line: yes Reasons if yes: Blood draw   Patient has Krueger Cath: yes Reasons if yes: retention      Last Vitals:     Vitals:    06/17/17 2023 06/17/17 2300 06/18/17 0400 06/18/17 0517   BP: 114/68 117/69 112/67    Pulse: 85 80 83    Resp: 18 16 18    Temp: 99 °F (37.2 °C) 97.9 °F (36.6 °C) 98.4 °F (36.9 °C)    SpO2: 97% 98% 95%    Weight:    102.8 kg (226 lb 11.2 oz)   Height:            IV and DRAINS (will only show if present)   [REMOVED] Peripheral IV 06/06/17 Right Wrist-Site Assessment: Clean, dry, & intact  [REMOVED] Peripheral IV 06/06/17 Left Wrist-Site Assessment: Clean, dry, & intact  [REMOVED] Peripheral IV 06/08/17 Left Forearm-Site Assessment: Clean, dry, & intact  [REMOVED] Peripheral IV 06/11/17 Right Hand-Site Assessment: Clean, dry, & intact  [REMOVED] Peripheral IV 06/12/17 Right Antecubital-Site Assessment: Clean, dry, & intact  PICC Single Lumen 25/70/75 Right;Basilic-Site Assessment: Clean, dry, & intact     WOUND (if present)   Wound Type:  Stage 2 Sacrum   Dressing present Dressing Present : Yes, Changed   Wound Concerns/Notes:  none     PAIN    Pain Assessment    Pain Intensity 1: 0 (06/18/17 0010)    Pain Location 1: Leg    Pain Intervention(s) 1: Medication (see MAR)    Patient Stated Pain Goal: 0  o Interventions for Pain:  none  o Intervention effective: yes  o Time of last intervention: see mar   o Reassessment Completed: yes      Last 3 Weights:  Last 3 Recorded Weights in this Encounter    06/17/17 0500 06/17/17 1602 06/18/17 0517   Weight: 106.5 kg (234 lb 14.4 oz) 106.5 kg (234 lb 14.4 oz) 102.8 kg (226 lb 11.2 oz)     Weight change: 0 kg (0 lb)     INTAKE/OUPUT    Current Shift:      Last three shifts: 06/16 1901 - 06/18 0700  In: 970 [P.O.:720; I.V.:250]  Out: 2700 [Urine:2700]     LAB RESULTS     Recent Labs      06/15/17   1130   WBC  6.2   HGB  9.6*   HCT  29.8*   PLT  241        Recent Labs      06/15/17   2035   NA  138   K  4.0   GLU  194*   BUN  19*   CREA  0.96   CA  8.0*   MG  1.7       RECOMMENDATIONS AND DISCHARGE PLANNING     1. Pending tests/procedures/ Plan of Care or Other Needs: none     2. Discharge plan for patient and Needs/Barriers: none    3. Estimated Discharge Date: 6/19/17 Posted on Whiteboard in Patients Room: no      4. The patient's care plan was reviewed with the oncoming nurse. \"HEALS\" SAFETY CHECK      Fall Risk    Total Score: 3    Safety Measures: Safety Measures: Bed/Chair-Wheels locked, Bed in low position, Call light within reach, Fall prevention (comment), Gripper socks, Side rails X2    A safety check occurred in the patient's room between off going nurse and oncoming nurse listed above.     The safety check included the below items  Area Items   H  High Alert Medications - Verify all high alert medication drips (heparin, PCA, etc.)   E  Equipment - Suction is set up for ALL patients (with yanker)  - Red plugs utilized for all equipment (IV pumps, etc.)  - WOWs wiped down at end of shift.  - Room stocked with oxygen, suction, and other unit-specific supplies   A  Alarms - Bed alarm is set for fall risk patients  - Ensure chair alarm is in place and activated if patient is up in a chair   L  Lines - Check IV for any infiltration  - Krueger bag is empty if patient has a Krueger   - Tubing and IV bags are labeled   S  Safety   - Room is clean, patient is clean, and equipment is clean. - Hallways are clear from equipment besides carts. - Fall bracelet on for fall risk patients  - Ensure room is clear and free of clutter  - Suction is set up for ALL patients (with gautamker)  - Hallways are clear from equipment besides carts.    - Isolation precautions followed, supplies available outside room, sign posted     Kathy Jordan  \

## 2017-06-18 NOTE — ROUTINE PROCESS
Bedside and Verbal shift change report given to Analia SANCHEZ (oncoming nurse) by Gilson Meraz (offgoing nurse). Report included the following information SBAR, Kardex, MAR and Recent Results. SITUATION:    Code Status: Full Code   Reason for Admission: Anemia   Hypervolemia   Anemia    [facility] day: 6   Problem List:       Hospital Problems  Date Reviewed: 5/24/2017          Codes Class Noted POA    Hypervolemia ICD-10-CM: E87.70  ICD-9-CM: 276.69  6/6/2017 Unknown        Anemia ICD-10-CM: D64.9  ICD-9-CM: 285.9  6/6/2017 Unknown              BACKGROUND:    Past Medical History:   Past Medical History:   Diagnosis Date    Acute paraplegia ([facility] [address] 4/20/2017    Benign hypertensive heart disease with systolic CHF, NYHA class 2 ([facility] [address] 9/5/2012    Biventricular implantable cardioverter-defibrillator in situ 04/28/2005    Upgraded to BiV AICD; gen change 4/2008; pocket revision 10/2009; Abdominal - done on 8/22/2012 by Dr. Taiwo Schroeder Cardiac cath 08/15/1996    Patent coronaries. Elev LVEDP. EF 50-55%.  Cardiac echocardiogram 06/23/2015    Ltd study. EF 45-50%. Mild, diffuse hypk. Severe apical hypk. No mass or thrombus was clearly identified, although imaging was suboptimal.      Cardiac nuclear imaging test 06/19/2015    Fixed distal apical, distal septal defect more likely due to RV pacing than prior infarct. No ischemia. EF 46%. RWMA c/w RV pacing. Nondiagnostic EKG on pharm stress test.      Cardiovascular lower extremity venous duplex 09/04/2012    Acute, non-occlusive DVT in CFV on right. No DVT on left. No superficial thrombosis bilaterally.  Cardiovascular upper extremity venous duplex 08/27/2012    DVT in axillary vein on left. Left subclavian was not visualized.     Chronic anemia 9/5/2012    Chronic systolic heart failure (HCC)     Decreased calculated glomerular filtration rate (GFR) 3/30/2017    Calculated GFR equivalent to that of CKD stage 3 = 30-59 ml/min    Diabetic neuropathy associated with type 2 diabetes mellitus ([address] 6/28/2011    Difficult airway for intubation 08/22/2012    see anesthesia airway note    Dyslipidemia 6/28/2011    Gout     History of complete heart block 6/28/2011    History of Coumadin therapy     Anticoagulation for DVT of the LUE; Discontinued on 3/30/2017    History of deep venous thrombosis 9/5/2012    Left upper extremity    History of pyelonephritis 3/30/2017    Left bundle branch block (LBBB) on electrocardiogram 6/28/2011    Nonischemic cardiomyopathy ([address] 6/28/2011    Obesity (BMI 35.0-39.9 without comorbidity) ([address] 3/13/2017    Obstructive sleep apnea on CPAP 2/7/2012    Psoas abscess, right ([address] 4/20/2017    Psoas hematoma, right, secondary to anticoagulant therapy 3/30/2017    Type 2 diabetes mellitus with diabetic neuropathy ([address] 6/28/2011         Patient taking anticoagulants no     ASSESSMENT:    Changes in Assessment Throughout Shift: none     Patient has Central Line: yes Reasons if yes: Blood draw   Patient has Krueger Cath: yes Reasons if yes: retention      Last Vitals:     Vitals:    06/17/17 1151 06/17/17 1525 06/17/17 1602 06/17/17 2023   BP: 117/75 119/66  114/68   Pulse: 79 82  85   Resp: 18 18  18   Temp: 98 °F (36.7 °C) 98.5 °F (36.9 °C)  99 °F (37.2 °C)   SpO2: 100% 97%  97%   Weight:   106.5 kg (234 lb 14.4 oz)    Height:   5' 7\" (1.702 m)         IV and DRAINS (will only show if present)   [REMOVED] Peripheral IV 06/06/17 Right Wrist-Site Assessment: Clean, dry, & intact  [REMOVED] Peripheral IV 06/06/17 Left Wrist-Site Assessment: Clean, dry, & intact  [REMOVED] Peripheral IV 06/08/17 Left Forearm-Site Assessment: Clean, dry, & intact  [REMOVED] Peripheral IV 06/11/17 Right Hand-Site Assessment: Clean, dry, & intact  [REMOVED] Peripheral IV 06/12/17 Right Antecubital-Site Assessment: Clean, dry, & intact  PICC Single Lumen 71/63/28 Right;Basilic-Site Assessment: Clean, dry, & intact     WOUND (if present)   Wound Type:  Stage 2 Sacrum   Dressing present Dressing Present : Yes, Changed   Wound Concerns/Notes:  none     PAIN    Pain Assessment    Pain Intensity 1: 0 (06/17/17 1815)    Pain Location 1: Leg    Pain Intervention(s) 1: Medication (see MAR)    Patient Stated Pain Goal: 0  o Interventions for Pain:  none  o Intervention effective: yes  o Time of last intervention: see mar   o Reassessment Completed: yes      Last 3 Weights:  Last 3 Recorded Weights in this Encounter    06/16/17 0652 06/17/17 0500 06/17/17 1602   Weight: 103.9 kg (229 lb) 106.5 kg (234 lb 14.4 oz) 106.5 kg (234 lb 14.4 oz)     Weight change: 2.676 kg (5 lb 14.4 oz)     INTAKE/OUPUT    Current Shift: 06/17 1901 - 06/18 0700  In: -   Out: 200 [Urine:200]    Last three shifts: 06/16 0701 - 06/17 1900  In: 1080 [P.O.:1080]  Out: 2700 [Urine:2700]     LAB RESULTS     Recent Labs      06/15/17   1130   WBC  6.2   HGB  9.6*   HCT  29.8*   PLT  241        Recent Labs      06/15/17   2035   NA  138   K  4.0   GLU  194*   BUN  19*   CREA  0.96   CA  8.0*   MG  1.7       RECOMMENDATIONS AND DISCHARGE PLANNING     1. Pending tests/procedures/ Plan of Care or Other Needs: none     2. Discharge plan for patient and Needs/Barriers: none    3. Estimated Discharge Date: 6/19/17 Posted on Whiteboard in Patients Room: no      4. The patient's care plan was reviewed with the oncoming nurse. \"HEALS\" SAFETY CHECK      Fall Risk    Total Score: 2    Safety Measures: Safety Measures: Bed/Chair-Wheels locked, Bed in low position, Call light within reach, Gripper socks, Side rails X 3    A safety check occurred in the patient's room between off going nurse and oncoming nurse listed above.     The safety check included the below items  Area Items   H  High Alert Medications - Verify all high alert medication drips (heparin, PCA, etc.)   E  Equipment - Suction is set up for ALL patients (with vania)  - Red plugs utilized for all equipment (IV pumps, etc.)  - WOWs wiped down at end of shift.  - Room stocked with oxygen, suction, and other unit-specific supplies   A  Alarms - Bed alarm is set for fall risk patients  - Ensure chair alarm is in place and activated if patient is up in a chair   L  Lines - Check IV for any infiltration  - Krueger bag is empty if patient has a Krueger   - Tubing and IV bags are labeled   S  Safety   - Room is clean, patient is clean, and equipment is clean. - Hallways are clear from equipment besides carts. - Fall bracelet on for fall risk patients  - Ensure room is clear and free of clutter  - Suction is set up for ALL patients (with vania)  - Hallways are clear from equipment besides carts.    - Isolation precautions followed, supplies available outside room, sign posted     Kayla Allen  \

## 2017-06-19 LAB
ANION GAP BLD CALC-SCNC: 7 MMOL/L (ref 3–18)
BACTERIA SPEC CULT: NORMAL
BASOPHILS # BLD AUTO: 0 K/UL (ref 0–0.1)
BASOPHILS # BLD: 1 % (ref 0–2)
BUN SERPL-MCNC: 23 MG/DL (ref 7–18)
BUN/CREAT SERPL: 21 (ref 12–20)
CALCIUM SERPL-MCNC: 9.6 MG/DL (ref 8.5–10.1)
CHLORIDE SERPL-SCNC: 103 MMOL/L (ref 100–108)
CO2 SERPL-SCNC: 28 MMOL/L (ref 21–32)
CREAT SERPL-MCNC: 1.09 MG/DL (ref 0.6–1.3)
DIFFERENTIAL METHOD BLD: ABNORMAL
EOSINOPHIL # BLD: 0.2 K/UL (ref 0–0.4)
EOSINOPHIL NFR BLD: 3 % (ref 0–5)
ERYTHROCYTE [DISTWIDTH] IN BLOOD BY AUTOMATED COUNT: 16.3 % (ref 11.6–14.5)
GLUCOSE BLD STRIP.AUTO-MCNC: 153 MG/DL (ref 70–110)
GLUCOSE BLD STRIP.AUTO-MCNC: 177 MG/DL (ref 70–110)
GLUCOSE BLD STRIP.AUTO-MCNC: 242 MG/DL (ref 70–110)
GLUCOSE BLD STRIP.AUTO-MCNC: 254 MG/DL (ref 70–110)
GLUCOSE SERPL-MCNC: 153 MG/DL (ref 74–99)
HCT VFR BLD AUTO: 26.9 % (ref 35–45)
HGB BLD-MCNC: 8.8 G/DL (ref 12–16)
LYMPHOCYTES # BLD AUTO: 36 % (ref 21–52)
LYMPHOCYTES # BLD: 2.2 K/UL (ref 0.9–3.6)
MCH RBC QN AUTO: 26.7 PG (ref 24–34)
MCHC RBC AUTO-ENTMCNC: 32.7 G/DL (ref 31–37)
MCV RBC AUTO: 81.8 FL (ref 74–97)
MONOCYTES # BLD: 0.5 K/UL (ref 0.05–1.2)
MONOCYTES NFR BLD AUTO: 8 % (ref 3–10)
NEUTS SEG # BLD: 3.2 K/UL (ref 1.8–8)
NEUTS SEG NFR BLD AUTO: 52 % (ref 40–73)
PLATELET # BLD AUTO: 237 K/UL (ref 135–420)
PMV BLD AUTO: 9 FL (ref 9.2–11.8)
POTASSIUM SERPL-SCNC: 4.2 MMOL/L (ref 3.5–5.5)
RBC # BLD AUTO: 3.29 M/UL (ref 4.2–5.3)
SERVICE CMNT-IMP: NORMAL
SODIUM SERPL-SCNC: 138 MMOL/L (ref 136–145)
WBC # BLD AUTO: 6.2 K/UL (ref 4.6–13.2)

## 2017-06-19 PROCEDURE — 82962 GLUCOSE BLOOD TEST: CPT

## 2017-06-19 PROCEDURE — 85025 COMPLETE CBC W/AUTO DIFF WBC: CPT | Performed by: FAMILY MEDICINE

## 2017-06-19 PROCEDURE — 65660000000 HC RM CCU STEPDOWN

## 2017-06-19 PROCEDURE — 74011250636 HC RX REV CODE- 250/636: Performed by: FAMILY MEDICINE

## 2017-06-19 PROCEDURE — 74011250637 HC RX REV CODE- 250/637: Performed by: FAMILY MEDICINE

## 2017-06-19 PROCEDURE — 74011636637 HC RX REV CODE- 636/637: Performed by: FAMILY MEDICINE

## 2017-06-19 PROCEDURE — 74011000258 HC RX REV CODE- 258: Performed by: FAMILY MEDICINE

## 2017-06-19 PROCEDURE — 80048 BASIC METABOLIC PNL TOTAL CA: CPT | Performed by: FAMILY MEDICINE

## 2017-06-19 RX ADMIN — INSULIN LISPRO 6 UNITS: 100 INJECTION, SOLUTION INTRAVENOUS; SUBCUTANEOUS at 11:30

## 2017-06-19 RX ADMIN — Medication 400 MG: at 09:42

## 2017-06-19 RX ADMIN — GABAPENTIN 600 MG: 300 CAPSULE ORAL at 15:41

## 2017-06-19 RX ADMIN — GABAPENTIN 600 MG: 300 CAPSULE ORAL at 09:42

## 2017-06-19 RX ADMIN — CEFTRIAXONE 2 G: 2 INJECTION, POWDER, FOR SOLUTION INTRAMUSCULAR; INTRAVENOUS at 00:16

## 2017-06-19 RX ADMIN — LACTOBACILLUS TAB 1 TABLET: TAB at 09:00

## 2017-06-19 RX ADMIN — Medication 400 MG: at 18:30

## 2017-06-19 RX ADMIN — STANDARDIZED SENNA CONCENTRATE AND DOCUSATE SODIUM 2 TABLET: 8.6; 5 TABLET, FILM COATED ORAL at 18:29

## 2017-06-19 RX ADMIN — Medication 10 ML: at 23:16

## 2017-06-19 RX ADMIN — GABAPENTIN 600 MG: 300 CAPSULE ORAL at 22:36

## 2017-06-19 RX ADMIN — Medication 10 ML: at 14:00

## 2017-06-19 RX ADMIN — ALLOPURINOL 50 MG: 100 TABLET ORAL at 09:43

## 2017-06-19 RX ADMIN — INSULIN LISPRO 2 UNITS: 100 INJECTION, SOLUTION INTRAVENOUS; SUBCUTANEOUS at 07:30

## 2017-06-19 RX ADMIN — POTASSIUM CHLORIDE 20 MEQ: 20 TABLET, EXTENDED RELEASE ORAL at 09:42

## 2017-06-19 RX ADMIN — LACTOBACILLUS TAB 1 TABLET: TAB at 18:29

## 2017-06-19 RX ADMIN — MIDODRINE HYDROCHLORIDE 2.5 MG: 2.5 TABLET ORAL at 12:00

## 2017-06-19 RX ADMIN — FUROSEMIDE 40 MG: 40 TABLET ORAL at 09:44

## 2017-06-19 RX ADMIN — INSULIN LISPRO 4 UNITS: 100 INJECTION, SOLUTION INTRAVENOUS; SUBCUTANEOUS at 23:16

## 2017-06-19 RX ADMIN — FERROUS SULFATE TAB 325 MG (65 MG ELEMENTAL FE) 325 MG: 325 (65 FE) TAB at 18:29

## 2017-06-19 RX ADMIN — DIPHENHYDRAMINE HYDROCHLORIDE 25 MG: 25 CAPSULE ORAL at 09:39

## 2017-06-19 RX ADMIN — MIDODRINE HYDROCHLORIDE 2.5 MG: 2.5 TABLET ORAL at 09:42

## 2017-06-19 RX ADMIN — VITAMIN D, TAB 1000IU (100/BT) 2000 UNITS: 25 TAB at 09:42

## 2017-06-19 RX ADMIN — INSULIN LISPRO 2 UNITS: 100 INJECTION, SOLUTION INTRAVENOUS; SUBCUTANEOUS at 16:30

## 2017-06-19 RX ADMIN — POTASSIUM CHLORIDE 20 MEQ: 20 TABLET, EXTENDED RELEASE ORAL at 18:30

## 2017-06-19 RX ADMIN — LOPERAMIDE HYDROCHLORIDE 2 MG: 2 SOLUTION ORAL at 09:37

## 2017-06-19 RX ADMIN — Medication 10 ML: at 04:59

## 2017-06-19 RX ADMIN — DIPHENHYDRAMINE HYDROCHLORIDE 25 MG: 25 CAPSULE ORAL at 18:30

## 2017-06-19 RX ADMIN — Medication 10 ML: at 15:00

## 2017-06-19 RX ADMIN — MIDODRINE HYDROCHLORIDE 2.5 MG: 2.5 TABLET ORAL at 18:29

## 2017-06-19 RX ADMIN — INSULIN GLARGINE 5 UNITS: 100 INJECTION, SOLUTION SUBCUTANEOUS at 23:15

## 2017-06-19 RX ADMIN — FAMOTIDINE 20 MG: 20 TABLET ORAL at 22:36

## 2017-06-19 NOTE — ROUTINE PROCESS
Bedside and Verbal shift change report given to Analia SANCHEZ (oncoming nurse) by Laine Beal (offgoing nurse). Report included the following information SBAR, Kardex, MAR and Recent Results. SITUATION:    Code Status: Full Code  Reason for Admission: Anemia  Hypervolemia   Anemia    [facility] day: 15   Problem List:       Hospital Problems  Date Reviewed: 5/24/2017          Codes Class Noted POA    Hypervolemia ICD-10-CM: E87.70  ICD-9-CM: 276.69  6/6/2017 Unknown        Anemia ICD-10-CM: D64.9  ICD-9-CM: 285.9  6/6/2017 Unknown              BACKGROUND:    Past Medical History:   Past Medical History:   Diagnosis Date    Acute paraplegia ([facility] [address] 4/20/2017    Benign hypertensive heart disease with systolic CHF, NYHA class 2 ([facility] [address] 9/5/2012    Biventricular implantable cardioverter-defibrillator in situ 04/28/2005    Upgraded to BiV AICD; gen change 4/2008; pocket revision 10/2009; Abdominal - done on 8/22/2012 by Dr. Blue Abann Cardiac cath 08/15/1996    Patent coronaries. Elev LVEDP. EF 50-55%.  Cardiac echocardiogram 06/23/2015    Ltd study. EF 45-50%. Mild, diffuse hypk. Severe apical hypk. No mass or thrombus was clearly identified, although imaging was suboptimal.      Cardiac nuclear imaging test 06/19/2015    Fixed distal apical, distal septal defect more likely due to RV pacing than prior infarct. No ischemia. EF 46%. RWMA c/w RV pacing. Nondiagnostic EKG on pharm stress test.      Cardiovascular lower extremity venous duplex 09/04/2012    Acute, non-occlusive DVT in CFV on right. No DVT on left. No superficial thrombosis bilaterally.  Cardiovascular upper extremity venous duplex 08/27/2012    DVT in axillary vein on left. Left subclavian was not visualized.     Chronic anemia 9/5/2012    Chronic systolic heart failure (HCC)     Decreased calculated glomerular filtration rate (GFR) 3/30/2017    Calculated GFR equivalent to that of CKD stage 3 = 30-59 ml/min    Diabetic neuropathy associated with type 2 diabetes mellitus ([facility] [address] 6/28/2011    Difficult airway for intubation 08/22/2012    see anesthesia airway note    Dyslipidemia 6/28/2011    Gout     History of complete heart block 6/28/2011    History of Coumadin therapy     Anticoagulation for DVT of the LUE; Discontinued on 3/30/2017    History of deep venous thrombosis 9/5/2012    Left upper extremity    History of pyelonephritis 3/30/2017    Left bundle branch block (LBBB) on electrocardiogram 6/28/2011    Nonischemic cardiomyopathy ([address] 6/28/2011    Obesity (BMI 35.0-39.9 without comorbidity) ([address] 3/13/2017    Obstructive sleep apnea on CPAP 2/7/2012    Psoas abscess, right ([address] 4/20/2017    Psoas hematoma, right, secondary to anticoagulant therapy 3/30/2017    Type 2 diabetes mellitus with diabetic neuropathy ([facility] [address] 6/28/2011         Patient taking anticoagulants no     ASSESSMENT:    Changes in Assessment Throughout Shift: none     Patient has Central Line: yes Reasons if yes: Blood draw   Patient has Krueger Cath: yes Reasons if yes: retention      Last Vitals:     Vitals:    06/18/17 0800 06/18/17 0804 06/18/17 1141 06/18/17 1555   BP:  121/82 130/72 109/70   Pulse:  78 86 80   Resp:  20 20 18   Temp:  98.6 °F (37 °C) 98.5 °F (36.9 °C) 99.2 °F (37.3 °C)   SpO2: 96% 96% 97% 98%   Weight:       Height:            IV and DRAINS (will only show if present)   [REMOVED] Peripheral IV 06/06/17 Right Wrist-Site Assessment: Clean, dry, & intact  [REMOVED] Peripheral IV 06/06/17 Left Wrist-Site Assessment: Clean, dry, & intact  [REMOVED] Peripheral IV 06/08/17 Left Forearm-Site Assessment: Clean, dry, & intact  [REMOVED] Peripheral IV 06/11/17 Right Hand-Site Assessment: Clean, dry, & intact  [REMOVED] Peripheral IV 06/12/17 Right Antecubital-Site Assessment: Clean, dry, & intact  PICC Single Lumen 36/36/80 Right;Basilic-Site Assessment: Clean, dry, & intact     WOUND (if present)   Wound Type:  Stage 2 Sacrum   Dressing present Dressing Present : Yes, Changed   Wound Concerns/Notes:  none     PAIN    Pain Assessment    Pain Intensity 1: 0 (06/18/17 0010)    Pain Location 1: Leg    Pain Intervention(s) 1: Medication (see MAR)    Patient Stated Pain Goal: 0  o Interventions for Pain:  none  o Intervention effective: yes  o Time of last intervention: see mar   o Reassessment Completed: yes      Last 3 Weights:  Last 3 Recorded Weights in this Encounter    06/17/17 0500 06/17/17 1602 06/18/17 0517   Weight: 106.5 kg (234 lb 14.4 oz) 106.5 kg (234 lb 14.4 oz) 102.8 kg (226 lb 11.2 oz)     Weight change: 0 kg (0 lb)     INTAKE/OUPUT    Current Shift:      Last three shifts: 06/17 0701 - 06/18 1900  In: 9995 [P.O.:1440; I.V.:250]  Out: 2375 [Urine:2375]     LAB RESULTS     No results for input(s): WBC, HGB, HCT, PLT, HGBEXT, HCTEXT, PLTEXT, HGBEXT, HCTEXT, PLTEXT in the last 72 hours. Recent Labs      06/15/17   2035   NA  138   K  4.0   GLU  194*   BUN  19*   CREA  0.96   CA  8.0*   MG  1.7       RECOMMENDATIONS AND DISCHARGE PLANNING     1. Pending tests/procedures/ Plan of Care or Other Needs: none     2. Discharge plan for patient and Needs/Barriers: none    3. Estimated Discharge Date: 6/19/17 Posted on Whiteboard in Patients Room: no      4. The patient's care plan was reviewed with the oncoming nurse. \"HEALS\" SAFETY CHECK      Fall Risk    Total Score: 3    Safety Measures: Safety Measures: Bed/Chair-Wheels locked, Bed in low position, Call light within reach, Fall prevention (comment), Gripper socks, Side rails X 3    A safety check occurred in the patient's room between off going nurse and oncoming nurse listed above.     The safety check included the below items  Area Items   H  High Alert Medications - Verify all high alert medication drips (heparin, PCA, etc.)   E  Equipment - Suction is set up for ALL patients (with gautamker)  - Red plugs utilized for all equipment (IV pumps, etc.)  - WOWs wiped down at end of shift.  - Room stocked with oxygen, suction, and other unit-specific supplies   A  Alarms - Bed alarm is set for fall risk patients  - Ensure chair alarm is in place and activated if patient is up in a chair   L  Lines - Check IV for any infiltration  - Krueger bag is empty if patient has a Krueger   - Tubing and IV bags are labeled   S  Safety   - Room is clean, patient is clean, and equipment is clean. - Hallways are clear from equipment besides carts. - Fall bracelet on for fall risk patients  - Ensure room is clear and free of clutter  - Suction is set up for ALL patients (with yanker)  - Hallways are clear from equipment besides carts.    - Isolation precautions followed, supplies available outside room, sign posted     Nory Rebolledo

## 2017-06-19 NOTE — PROGRESS NOTES
Care Management Interventions  Mode of Transport at Discharge: BLS  Transition of Care Consult (CM Consult): SNF  Partner SNF: No  Reason Why Partner SNF Not Chosen: Positive previous encounter, Friend/family recommendation  Current Support Network: Lives with Spouse  Plan discussed with Pt/Family/Caregiver: Yes  Discharge Location  Discharge Placement: Skilled nursing facility     CM met with pt in room. Pt is willing to return to [facility] to resume rehab. CM contacted physician to request PT/OT evals on pt. Pt has insurance that will need to authorize transfer.

## 2017-06-19 NOTE — PROGRESS NOTES
Gilson Wallace M.D. PROGRESS NOTE    Name: Arya Ley MRN: [id number]   : 1950 Hospital: [facility]   Date: 2017  Admission Date: 2017     Subjective/Objective/Plans  Feeling better  VSS  Plan  PT/OT    Vital Signs:  Visit Vitals    /78 (BP 1 Location: Left arm, BP Patient Position: At rest)    Pulse 81    Temp 97.5 °F (36.4 °C)    Resp 20    Ht 5' 7\" (1.702 m)    Wt 108.5 kg (239 lb 4.8 oz)    SpO2 98%    Breastfeeding No    BMI 37.48 kg/m2       O2 Device: Room air       Temp (24hrs), Av.3 °F (36.8 °C), Min:97.5 °F (36.4 °C), Max:99.2 °F (37.3 °C)     12:15 PM  Intake/Output:   Last shift:       07 -  190  In: -   Out: 1450 [Urine:1450]  Last 3 shifts:  190 -  0700  In: 970 [P.O.:720; I.V.:250]  Out: 2925 [Urine:2925]    Intake/Output Summary (Last 24 hours) at 17 1215  Last data filed at 17 1128   Gross per 24 hour   Intake              480 ml   Output             3100 ml   Net            -2620 ml         Physical Exam:    General: in no apparent distress    HEENT: pupils equal, no ear discharge   Neck: No abnormally enlarged lymph nodes. , no JDV   Mouth: MMM no lesions    Chest: normal, no breast masses   Lungs: normal air entry   Heart: Regular rate and rhythm   Abdomen: abdomen is soft without significant tenderness, masses, organomegaly or guarding   Extremity: paraplegia   Neuro: alert    Skin: Skin color, texture, turgor normal. No rashes or lesions    Data Review:    Labs: Results:       Chemistry Recent Labs      17   0500   GLU  153*   NA  138   K  4.2   CL  103   CO2  28   BUN  23*   CREA  1.09   CA  9.6   AGAP  7   BUCR  21*      CBC w/Diff Recent Labs      17   0500   WBC  6.2   RBC  3.29*   HGB  8.8*   HCT  26.9*   PLT  237   GRANS  52   LYMPH  36   EOS  3      Cardiac Enzymes No results for input(s): CPK, CKND1, CARLI in the last 72 hours.     No lab exists for component: CKRMB, TROIP   Coagulation No results for input(s): PTP, INR, APTT in the last 72 hours. No lab exists for component: INREXT    Lipid Panel Lab Results   Component Value Date/Time    Cholesterol, total 154 07/24/2012 01:00 AM    HDL Cholesterol 48 07/24/2012 01:00 AM    LDL, calculated 90.4 07/24/2012 01:00 AM    VLDL, calculated 15.6 07/24/2012 01:00 AM    Triglyceride 78 07/24/2012 01:00 AM    CHOL/HDL Ratio 3.2 07/24/2012 01:00 AM      BNP No results for input(s): BNPP in the last 72 hours. Liver Enzymes No results for input(s): TP, ALB, TBILI, AP, SGOT, ALT, CBIL in the last 72 hours. Thyroid Studies Lab Results   Component Value Date/Time    TSH 0.51 04/20/2017 07:26 PM          Procedures/imaging: see electronic medical records for all procedures, Xrays and details which were not copied into this note but were reviewed. Allergies: Allergies   Allergen Reactions    Vancomycin Itching    Ampicillin Itching    Bactrim [Sulfamethoxazole-Trimethoprim] Unknown (comments)    Blueberry Swelling     Causes throat swelling    Ciprofloxacin Itching    Codeine Other (comments)     Jumpy feeling    Crestor [Rosuvastatin] Itching    Darvocet A500 [Propoxyphene N-Acetaminophen] Itching    Demerol [Meperidine] Itching    Levaquin [Levofloxacin] Itching    Lipitor [Atorvastatin] Myalgia    Magnesium Oxide Itching     nausea    Minocin [Minocycline] Unknown (comments)    Pcn [Penicillins] Itching    Pravachol [Pravastatin] Swelling     Swelling in mouth     Shellfish Derived Unknown (comments)    Sulfa (Sulfonamide Antibiotics) Itching    Ultracet [Tramadol-Acetaminophen] Itching    Vicodin [Hydrocodone-Acetaminophen] Unknown (comments)    Vytorin 10-10 [Ezetimibe-Simvastatin] Myalgia    Percodan [Oxycodone Hcl-Oxycodone-Asa] Itching       Home Medications:  Prior to Admission Medications   Prescriptions Last Dose Informant Patient Reported? Taking?    Lactobacillus Acidoph & Stephanie MENDEZ [facility]) 1 million cell tab tablet   No No   Sig: Take 1 Tab by mouth two (2) times a day for 21 days. acetaminophen (TYLENOL) 325 mg tablet   No No   Sig: Take 2 Tabs by mouth every four (4) hours as needed (for fever or pain level less than 5/10). Indications: Fever, Pain   albuterol-ipratropium (DUO-NEB) 2.5 mg-0.5 mg/3 ml nebu   No No   Sig: 3 mL by Nebulization route four (4) times daily. allopurinol (ZYLOPRIM) 100 mg tablet   No No   Sig: Take 0.5 Tabs by mouth daily. Indications: HYPERURICEMIA   aluminum-magnesium hydroxide (MAALOX) 200-200 mg/5 mL suspension   No No   Sig: Take 15 mL by mouth four (4) times daily as needed for Indigestion. cefTRIAXone 2 gram 2 g IVPB   No No   Si g by IntraVENous route every twenty-four (24) hours for 20 days. cholecalciferol (VITAMIN D3) 1,000 unit tablet   No No   Sig: Take 2 Tabs by mouth daily. Indications: PREVENTION OF VITAMIN D DEFICIENCY   famotidine (PEPCID) 20 mg tablet   No No   Sig: Take 1 Tab by mouth nightly. ferrous sulfate 325 mg (65 mg iron) tablet   No No   Sig: Take 1 Tab by mouth two (2) times daily (with meals) for 14 days. folic acid (FOLVITE) 1 mg tablet   No No   Sig: Take 1 Tab by mouth daily. insulin aspart (NOVOLOG) 100 unit/mL injection   No No   Sig: INITIATE INSULIN CORRECTIVE PROTOCOL (HR): Normal Insulin Sensitivity  For Blood Sugar (mg/dL) of:    Less than 150 =   0 units          150 -199 =   2 units 200 -249 =   4 units 250 -299 =   6 units 300 -349 =   8 units 350 and above =   10 units If 2 glucose readings are above 200 mg/dL   insulin glargine (LANTUS) 100 unit/mL injection   No No   Si Units by SubCUTAneous route nightly. Indications: type 2 diabetes mellitus   midodrine (PROAMITINE) 2.5 mg tablet   No No   Si mg [2 tablets] po three time a day for 5 days then 2.5 mg po three time a day x 5 days then stop   ondansetron (ZOFRAN ODT) 4 mg disintegrating tablet   Yes No   Sig: Take 4 mg by mouth every eight (8) hours as needed for Nausea. oxyCODONE-acetaminophen (PERCOCET 7.5) 7.5-325 mg per tablet   No No   Sig: Take 1 Tab by mouth every six (6) hours as needed (for pain level greater than 5/10). Max Daily Amount: 4 Tabs. Indications: Pain   polyethylene glycol (MIRALAX) 17 gram packet   No No   Sig: Take 1 Packet by mouth two (2) times a day. pravastatin (PRAVACHOL) 40 mg tablet   Yes No   Sig: Take 40 mg by mouth nightly. Indications: DYSLIPIDEMIA   senna-docusate (PERICOLACE) 8.6-50 mg per tablet   No No   Sig: Take 2 Tabs by mouth daily (after dinner).  Indications: Constipation      Facility-Administered Medications: None       Current Medications:  Current Facility-Administered Medications   Medication Dose Route Frequency    loperamide (IMODIUM) 1 mg/5 mL oral solution 2 mg  2 mg Oral QID PRN    gabapentin (NEURONTIN) capsule 600 mg  600 mg Oral TID    furosemide (LASIX) tablet 40 mg  40 mg Oral DAILY    diphenhydrAMINE (BENADRYL) capsule 25 mg  25 mg Oral Q6H PRN    sodium chloride (NS) flush 10 mL  10 mL InterCATHeter Q24H    sodium chloride (NS) flush 10 mL  10 mL InterCATHeter PRN    alteplase (CATHFLO) 1 mg in sterile water (preservative free) 1 mL injection  1 mg InterCATHeter PRN    potassium chloride (K-DUR, KLOR-CON) SR tablet 20 mEq  20 mEq Oral BID    ondansetron (ZOFRAN ODT) tablet 4 mg  4 mg Oral Q8H PRN    acetaminophen (TYLENOL) tablet 650 mg  650 mg Oral Q4H PRN    senna-docusate (PERICOLACE) 8.6-50 mg per tablet 2 Tab  2 Tab Oral PCD    cholecalciferol (VITAMIN D3) tablet 2,000 Units  2,000 Units Oral DAILY    oxyCODONE-acetaminophen (PERCOCET 7.5) 7.5-325 mg per tablet 1 Tab  1 Tab Oral Q6H PRN    allopurinol (ZYLOPRIM) tablet 50 mg  50 mg Oral DAILY    midodrine (PROAMITINE) tablet 2.5 mg  2.5 mg Oral TID WITH MEALS    Lactobacillus Acidoph & Bulgar (FLORANEX) tablet 1 Tab  1 Tab Oral BID    folic acid (FOLVITE) tablet 1 mg  1 mg Oral DAILY    insulin glargine (LANTUS) injection 5 Units  5 Units SubCUTAneous QHS    ferrous sulfate tablet 325 mg  325 mg Oral BID WITH MEALS    famotidine (PEPCID) tablet 20 mg  20 mg Oral QHS    polyethylene glycol (MIRALAX) packet 17 g  17 g Oral BID    cefTRIAXone (ROCEPHIN) 2 g in 0.9% sodium chloride (MBP/ADV) 50 mL MBP  2 g IntraVENous Q24H    aluminum-magnesium hydroxide (MAALOX) oral suspension 15 mL  15 mL Oral QID PRN    sodium chloride (NS) flush 5-10 mL  5-10 mL IntraVENous Q8H    albuterol-ipratropium (DUO-NEB) 2.5 MG-0.5 MG/3 ML  3 mL Nebulization Q4H PRN    magnesium oxide (MAG-OX) tablet 400 mg  400 mg Oral BID    diphenhydrAMINE (BENADRYL) capsule 25 mg  25 mg Oral BID    insulin lispro (HUMALOG) injection   SubCUTAneous AC&HS    glucose chewable tablet 16 g  16 g Oral PRN    glucagon (GLUCAGEN) injection 1 mg  1 mg IntraMUSCular PRN    dextrose (D50W) injection syrg 12.5-25 g  25-50 mL IntraVENous PRN    0.9% sodium chloride infusion 250 mL  250 mL IntraVENous PRN       Chart and notes reviewed. Data reviewed. I have evaluated and examined the patient. IMPRESSION:   Patient Active Problem List   Diagnosis Code    Nonischemic cardiomyopathy ([facility] I42.8    History of complete heart block Z86.79    Biventricular implantable cardioverter-defibrillator in situ Z95.810    Left bundle branch block (LBBB) on electrocardiogram I44.7    Type 2 diabetes mellitus with diabetic neuropathy (HCC) E11.40    Dyslipidemia E78.5    Diabetic neuropathy associated with type 2 diabetes mellitus ([facility] E11.40    Obstructive sleep apnea on CPAP G47.33, Z99.89    AICD generator infection ([facility] T82. 7XXA    Difficult airway for intubation T88. 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 1XXA    Impaired mobility and ADLs Z74.09    History of Coumadin therapy Z79.01    Gout M10.9    Acute paraplegia ([facility]) G82.20    Sepsis ([address] A41.9    Psoas abscess, right ([facility] [address] K68.12    Krueger catheter in place on admission Z96.0    Urinary tract infection due to Enterococcus N39.0, B95.2    Group B streptococcal infection A49.1    Hypervolemia E87.70    Anemia D64.9 ·         PLAN:/DISCUSSION:   · Dc today  ·         Phong Sherman MD  6/19/2017, 12:15 PM

## 2017-06-19 NOTE — PROGRESS NOTES
conducted a Follow up consultation and Spiritual Assessment for Sinai Metcalf, who is a 77 y.o.,female. The  provided the following Interventions:  Continued the relationship of care and support. Listened empathically. Offered prayer and assurance of continued prayer on patients behalf. Chart reviewed. The following outcomes were achieved:  Patient expressed gratitude for 's visit. Assessment:  There are no further spiritual or Mosque issues which require Spiritual Care Services interventions at this time. Plan:  Chaplains will continue to follow and will provide pastoral care on an as needed/requested basis.  recommends bedside caregivers page  on duty if patient shows signs of acute spiritual or emotional distress.      [id number] Certified [address] [place] [address]   [phone]

## 2017-06-19 NOTE — PROGRESS NOTES
Bedside shift change report given to [facility] MERRILL SILVA (oncoming nurse) DANIEL Bella(offgoing nurse). Report included the following information:SBAR,MAR,Kardex,Summary report and recent results. Patient denies any pain or distress and daughter is at bedside.

## 2017-06-19 NOTE — ROUTINE PROCESS
Bedside and Verbal shift change report given to Tori SANCHEZ (oncoming nurse) by Beverli Boas (offgoing nurse). Report included the following information SBAR, Kardex, MAR and Recent Results. SITUATION:    Code Status: Full Code  Reason for Admission: Anemia  Hypervolemia   Anemia    [facility] day: 13   Problem List:       Hospital Problems  Date Reviewed: 5/24/2017          Codes Class Noted POA    Hypervolemia ICD-10-CM: E87.70  ICD-9-CM: 276.69  6/6/2017 Unknown        Anemia ICD-10-CM: D64.9  ICD-9-CM: 285.9  6/6/2017 Unknown              BACKGROUND:    Past Medical History:   Past Medical History:   Diagnosis Date    Acute paraplegia ([facility] 4/20/2017    Benign hypertensive heart disease with systolic CHF, NYHA class 2 ([facility] [facility] 9/5/2012    Biventricular implantable cardioverter-defibrillator in situ 04/28/2005    Upgraded to BiV AICD; gen change 4/2008; pocket revision 10/2009; Abdominal - done on 8/22/2012 by Dr. Channing Belle Cardiac cath 08/15/1996    Patent coronaries. Elev LVEDP. EF 50-55%.  Cardiac echocardiogram 06/23/2015    Ltd study. EF 45-50%. Mild, diffuse hypk. Severe apical hypk. No mass or thrombus was clearly identified, although imaging was suboptimal.      Cardiac nuclear imaging test 06/19/2015    Fixed distal apical, distal septal defect more likely due to RV pacing than prior infarct. No ischemia. EF 46%. RWMA c/w RV pacing. Nondiagnostic EKG on pharm stress test.      Cardiovascular lower extremity venous duplex 09/04/2012    Acute, non-occlusive DVT in CFV on right. No DVT on left. No superficial thrombosis bilaterally.  Cardiovascular upper extremity venous duplex 08/27/2012    DVT in axillary vein on left. Left subclavian was not visualized.     Chronic anemia 9/5/2012    Chronic systolic heart failure (HCC)     Decreased calculated glomerular filtration rate (GFR) 3/30/2017    Calculated GFR equivalent to that of CKD stage 3 = 30-59 ml/min    Diabetic neuropathy associated with type 2 diabetes mellitus ([facility] 6/28/2011    Difficult airway for intubation 08/22/2012    see anesthesia airway note    Dyslipidemia 6/28/2011    Gout     History of complete heart block 6/28/2011    History of Coumadin therapy     Anticoagulation for DVT of the LUE; Discontinued on 3/30/2017    History of deep venous thrombosis 9/5/2012    Left upper extremity    History of pyelonephritis 3/30/2017    Left bundle branch block (LBBB) on electrocardiogram 6/28/2011    Nonischemic cardiomyopathy ([address] 6/28/2011    Obesity (BMI 35.0-39.9 without comorbidity) ([address] 3/13/2017    Obstructive sleep apnea on CPAP 2/7/2012    Psoas abscess, right ([address] 4/20/2017    Psoas hematoma, right, secondary to anticoagulant therapy 3/30/2017    Type 2 diabetes mellitus with diabetic neuropathy ([address] 6/28/2011         Patient taking anticoagulants no     ASSESSMENT:    Changes in Assessment Throughout Shift: none     Patient has Central Line: yes Reasons if yes: Blood draw   Patient has Krueger Cath: yes Reasons if yes: retention      Last Vitals:     Vitals:    06/18/17 1555 06/18/17 2112 06/19/17 0002 06/19/17 0526   BP: 109/70 110/71 113/70 109/68   Pulse: 80 85 81 88   Resp: 18 18 18 20   Temp: 99.2 °F (37.3 °C) 99.2 °F (37.3 °C) 97.7 °F (36.5 °C) 98.5 °F (36.9 °C)   SpO2: 98% 98% 98% 98%   Weight:    108.5 kg (239 lb 4.8 oz)   Height:            IV and DRAINS (will only show if present)   [REMOVED] Peripheral IV 06/06/17 Right Wrist-Site Assessment: Clean, dry, & intact  [REMOVED] Peripheral IV 06/06/17 Left Wrist-Site Assessment: Clean, dry, & intact  [REMOVED] Peripheral IV 06/08/17 Left Forearm-Site Assessment: Clean, dry, & intact  [REMOVED] Peripheral IV 06/11/17 Right Hand-Site Assessment: Clean, dry, & intact  [REMOVED] Peripheral IV 06/12/17 Right Antecubital-Site Assessment: Clean, dry, & intact  PICC Single Lumen 27/97/60 Right;Basilic-Site Assessment: Clean, dry, & intact     WOUND (if present)   Wound Type:  Stage 2 Sacrum   Dressing present Dressing Present : Yes, Changed   Wound Concerns/Notes:  none     PAIN    Pain Assessment    Pain Intensity 1: 0 (06/19/17 0002)    Pain Location 1: Leg    Pain Intervention(s) 1: Medication (see MAR)    Patient Stated Pain Goal: 0  o Interventions for Pain:  none  o Intervention effective: yes  o Time of last intervention: see mar   o Reassessment Completed: yes      Last 3 Weights:  Last 3 Recorded Weights in this Encounter    06/17/17 1602 06/18/17 0517 06/19/17 0526   Weight: 106.5 kg (234 lb 14.4 oz) 102.8 kg (226 lb 11.2 oz) 108.5 kg (239 lb 4.8 oz)     Weight change: 1.996 kg (4 lb 6.4 oz)     INTAKE/OUPUT    Current Shift:      Last three shifts: 06/17 1901 - 06/19 0700  In: 970 [P.O.:720; I.V.:250]  Out: 2925 [Urine:2925]     LAB RESULTS     Recent Labs      06/19/17   0500   WBC  6.2   HGB  8.8*   HCT  26.9*   PLT  237        Recent Labs      06/19/17   0500   NA  138   K  4.2   GLU  153*   BUN  23*   CREA  1.09   CA  9.6       RECOMMENDATIONS AND DISCHARGE PLANNING     1. Pending tests/procedures/ Plan of Care or Other Needs: none     2. Discharge plan for patient and Needs/Barriers: none    3. Estimated Discharge Date: 6/19/17 Posted on Whiteboard in Patients Room: no      4. The patient's care plan was reviewed with the oncoming nurse. \"HEALS\" SAFETY CHECK      Fall Risk    Total Score: 2    Safety Measures: Safety Measures: Bed/Chair-Wheels locked, Bed in low position, Call light within reach, Fall prevention (comment), Gripper socks, Side rails X2    A safety check occurred in the patient's room between off going nurse and oncoming nurse listed above.     The safety check included the below items  Area Items   H  High Alert Medications - Verify all high alert medication drips (heparin, PCA, etc.)   E  Equipment - Suction is set up for ALL patients (with yanker)  - Red plugs utilized for all equipment (IV pumps, etc.)  - WOWs wiped down at end of shift.  - Room stocked with oxygen, suction, and other unit-specific supplies   A  Alarms - Bed alarm is set for fall risk patients  - Ensure chair alarm is in place and activated if patient is up in a chair   L  Lines - Check IV for any infiltration  - Krueger bag is empty if patient has a Krueger   - Tubing and IV bags are labeled   S  Safety   - Room is clean, patient is clean, and equipment is clean. - Hallways are clear from equipment besides carts. - Fall bracelet on for fall risk patients  - Ensure room is clear and free of clutter  - Suction is set up for ALL patients (with vania)  - Hallways are clear from equipment besides carts.    - Isolation precautions followed, supplies available outside room, sign posted     Jose Tye

## 2017-06-20 LAB
GLUCOSE BLD STRIP.AUTO-MCNC: 162 MG/DL (ref 70–110)
GLUCOSE BLD STRIP.AUTO-MCNC: 174 MG/DL (ref 70–110)
GLUCOSE BLD STRIP.AUTO-MCNC: 219 MG/DL (ref 70–110)
GLUCOSE BLD STRIP.AUTO-MCNC: 234 MG/DL (ref 70–110)

## 2017-06-20 PROCEDURE — 65660000000 HC RM CCU STEPDOWN

## 2017-06-20 PROCEDURE — 97530 THERAPEUTIC ACTIVITIES: CPT

## 2017-06-20 PROCEDURE — 82962 GLUCOSE BLOOD TEST: CPT

## 2017-06-20 PROCEDURE — 74011636637 HC RX REV CODE- 636/637: Performed by: FAMILY MEDICINE

## 2017-06-20 PROCEDURE — 74011250637 HC RX REV CODE- 250/637: Performed by: FAMILY MEDICINE

## 2017-06-20 PROCEDURE — 74011250636 HC RX REV CODE- 250/636: Performed by: FAMILY MEDICINE

## 2017-06-20 PROCEDURE — 97161 PT EVAL LOW COMPLEX 20 MIN: CPT

## 2017-06-20 PROCEDURE — 74011000258 HC RX REV CODE- 258: Performed by: FAMILY MEDICINE

## 2017-06-20 RX ADMIN — LOPERAMIDE HYDROCHLORIDE 2 MG: 2 SOLUTION ORAL at 17:48

## 2017-06-20 RX ADMIN — MIDODRINE HYDROCHLORIDE 2.5 MG: 2.5 TABLET ORAL at 09:12

## 2017-06-20 RX ADMIN — GABAPENTIN 600 MG: 300 CAPSULE ORAL at 22:47

## 2017-06-20 RX ADMIN — CEFTRIAXONE 2 G: 2 INJECTION, POWDER, FOR SOLUTION INTRAMUSCULAR; INTRAVENOUS at 01:56

## 2017-06-20 RX ADMIN — Medication 400 MG: at 17:50

## 2017-06-20 RX ADMIN — FUROSEMIDE 40 MG: 40 TABLET ORAL at 09:11

## 2017-06-20 RX ADMIN — INSULIN LISPRO 2 UNITS: 100 INJECTION, SOLUTION INTRAVENOUS; SUBCUTANEOUS at 09:10

## 2017-06-20 RX ADMIN — VITAMIN D, TAB 1000IU (100/BT) 2000 UNITS: 25 TAB at 09:11

## 2017-06-20 RX ADMIN — Medication 400 MG: at 09:12

## 2017-06-20 RX ADMIN — Medication 10 ML: at 22:47

## 2017-06-20 RX ADMIN — POTASSIUM CHLORIDE 20 MEQ: 20 TABLET, EXTENDED RELEASE ORAL at 09:11

## 2017-06-20 RX ADMIN — MIDODRINE HYDROCHLORIDE 2.5 MG: 2.5 TABLET ORAL at 17:00

## 2017-06-20 RX ADMIN — LOPERAMIDE HYDROCHLORIDE 2 MG: 2 SOLUTION ORAL at 09:11

## 2017-06-20 RX ADMIN — Medication 10 ML: at 14:00

## 2017-06-20 RX ADMIN — DIPHENHYDRAMINE HYDROCHLORIDE 25 MG: 25 CAPSULE ORAL at 09:11

## 2017-06-20 RX ADMIN — LACTOBACILLUS TAB 1 TABLET: TAB at 17:49

## 2017-06-20 RX ADMIN — LACTOBACILLUS TAB 1 TABLET: TAB at 09:11

## 2017-06-20 RX ADMIN — FOLIC ACID 1 MG: 1 TABLET ORAL at 09:11

## 2017-06-20 RX ADMIN — FERROUS SULFATE TAB 325 MG (65 MG ELEMENTAL FE) 325 MG: 325 (65 FE) TAB at 17:50

## 2017-06-20 RX ADMIN — ALLOPURINOL 50 MG: 100 TABLET ORAL at 09:11

## 2017-06-20 RX ADMIN — GABAPENTIN 600 MG: 300 CAPSULE ORAL at 09:11

## 2017-06-20 RX ADMIN — INSULIN LISPRO 6 UNITS: 100 INJECTION, SOLUTION INTRAVENOUS; SUBCUTANEOUS at 22:47

## 2017-06-20 RX ADMIN — FERROUS SULFATE TAB 325 MG (65 MG ELEMENTAL FE) 325 MG: 325 (65 FE) TAB at 09:11

## 2017-06-20 RX ADMIN — GABAPENTIN 600 MG: 300 CAPSULE ORAL at 17:49

## 2017-06-20 RX ADMIN — DIPHENHYDRAMINE HYDROCHLORIDE 25 MG: 25 CAPSULE ORAL at 17:50

## 2017-06-20 RX ADMIN — INSULIN GLARGINE 5 UNITS: 100 INJECTION, SOLUTION SUBCUTANEOUS at 22:47

## 2017-06-20 RX ADMIN — Medication 10 ML: at 15:00

## 2017-06-20 RX ADMIN — POTASSIUM CHLORIDE 20 MEQ: 20 TABLET, EXTENDED RELEASE ORAL at 17:49

## 2017-06-20 RX ADMIN — FAMOTIDINE 20 MG: 20 TABLET ORAL at 22:47

## 2017-06-20 NOTE — PROGRESS NOTES
Javed Friedman M.D. PROGRESS NOTE    Name: Rachel Orourke MRN: [id number]   : 1950 Hospital: [address] [place] [address]   Date: 2017  Admission Date: 2017     Subjective/Objective/Plans  Awaiting insurance approval for SNF  Tolerating High dose Neurontin  VSS    Vital Signs:  Visit Vitals    /68 (BP 1 Location: Left arm, BP Patient Position: At rest)    Pulse 80    Temp 99.2 °F (37.3 °C)    Resp 18    Ht 5' 7\" (1.702 m)    Wt 106.1 kg (233 lb 15.9 oz)    SpO2 97%    Breastfeeding No    BMI 36.65 kg/m2       O2 Device: Room air       Temp (24hrs), Av.3 °F (36.8 °C), Min:97.5 °F (36.4 °C), Max:99.2 °F (37.3 °C)     7:46 AM  Intake/Output:   Last shift:         Last 3 shifts:  1901 -  0700  In: 290 [P.O.:240; I.V.:50]  Out: 5625 [Urine:5625]    Intake/Output Summary (Last 24 hours) at 17 0746  Last data filed at 17 0204   Gross per 24 hour   Intake              290 ml   Output             3975 ml   Net            -3685 ml         Physical Exam:    General: in no apparent distress, in no respiratory distress and acyanotic and alert    HEENT: pupils equal, no ear discharge   Neck: No abnormally enlarged lymph nodes. , no JDV   Mouth: MMM no lesions    Chest: normal, no breast masses   Lungs: normal air entry   Heart: Regular rate and rhythm   Abdomen: abdomen is soft without significant tenderness, masses, organomegaly or guarding   Extremity: paraplegia   Neuro: alert    Skin: Skin color, texture, turgor normal. No rashes or lesions    Data Review:    Labs: Results:       Chemistry Recent Labs      17   0500   GLU  153*   NA  138   K  4.2   CL  103   CO2  28   BUN  23*   CREA  1.09   CA  9.6   AGAP  7   BUCR  21*      CBC w/Diff Recent Labs      17   0500   WBC  6.2   RBC  3.29*   HGB  8.8*   HCT  26.9*   PLT  237   GRANS  52   LYMPH  36   EOS  3      Cardiac Enzymes No results for input(s): CPK, CKND1, CARLI in the last 72 hours.     No lab exists for component: CKRMB, TROIP   Coagulation No results for input(s): PTP, INR, APTT in the last 72 hours. No lab exists for component: INREXT    Lipid Panel Lab Results   Component Value Date/Time    Cholesterol, total 154 07/24/2012 01:00 AM    HDL Cholesterol 48 07/24/2012 01:00 AM    LDL, calculated 90.4 07/24/2012 01:00 AM    VLDL, calculated 15.6 07/24/2012 01:00 AM    Triglyceride 78 07/24/2012 01:00 AM    CHOL/HDL Ratio 3.2 07/24/2012 01:00 AM      BNP No results for input(s): BNPP in the last 72 hours. Liver Enzymes No results for input(s): TP, ALB, TBILI, AP, SGOT, ALT, CBIL in the last 72 hours. Thyroid Studies Lab Results   Component Value Date/Time    TSH 0.51 04/20/2017 07:26 PM          Procedures/imaging: see electronic medical records for all procedures, Xrays and details which were not copied into this note but were reviewed. Allergies: Allergies   Allergen Reactions    Vancomycin Itching    Ampicillin Itching    Bactrim [Sulfamethoxazole-Trimethoprim] Unknown (comments)    Blueberry Swelling     Causes throat swelling    Ciprofloxacin Itching    Codeine Other (comments)     Jumpy feeling    Crestor [Rosuvastatin] Itching    Darvocet A500 [Propoxyphene N-Acetaminophen] Itching    Demerol [Meperidine] Itching    Levaquin [Levofloxacin] Itching    Lipitor [Atorvastatin] Myalgia    Magnesium Oxide Itching     nausea    Minocin [Minocycline] Unknown (comments)    Pcn [Penicillins] Itching    Pravachol [Pravastatin] Swelling     Swelling in mouth     Shellfish Derived Unknown (comments)    Sulfa (Sulfonamide Antibiotics) Itching    Ultracet [Tramadol-Acetaminophen] Itching    Vicodin [Hydrocodone-Acetaminophen] Unknown (comments)    Vytorin 10-10 [Ezetimibe-Simvastatin] Myalgia    Percodan [Oxycodone Hcl-Oxycodone-Asa] Itching       Home Medications:  Prior to Admission Medications   Prescriptions Last Dose Informant Patient Reported? Taking?    Lactobacillus Acidoph & [facility]) 1 million cell tab tablet   No No   Sig: Take 1 Tab by mouth two (2) times a day for 21 days. acetaminophen (TYLENOL) 325 mg tablet   No No   Sig: Take 2 Tabs by mouth every four (4) hours as needed (for fever or pain level less than 5/10). Indications: Fever, Pain   albuterol-ipratropium (DUO-NEB) 2.5 mg-0.5 mg/3 ml nebu   No No   Sig: 3 mL by Nebulization route four (4) times daily. allopurinol (ZYLOPRIM) 100 mg tablet   No No   Sig: Take 0.5 Tabs by mouth daily. Indications: HYPERURICEMIA   aluminum-magnesium hydroxide (MAALOX) 200-200 mg/5 mL suspension   No No   Sig: Take 15 mL by mouth four (4) times daily as needed for Indigestion. cefTRIAXone 2 gram 2 g IVPB   No No   Si g by IntraVENous route every twenty-four (24) hours for 20 days. cholecalciferol (VITAMIN D3) 1,000 unit tablet   No No   Sig: Take 2 Tabs by mouth daily. Indications: PREVENTION OF VITAMIN D DEFICIENCY   famotidine (PEPCID) 20 mg tablet   No No   Sig: Take 1 Tab by mouth nightly. ferrous sulfate 325 mg (65 mg iron) tablet   No No   Sig: Take 1 Tab by mouth two (2) times daily (with meals) for 14 days. folic acid (FOLVITE) 1 mg tablet   No No   Sig: Take 1 Tab by mouth daily. insulin aspart (NOVOLOG) 100 unit/mL injection   No No   Sig: INITIATE INSULIN CORRECTIVE PROTOCOL (HR): Normal Insulin Sensitivity  For Blood Sugar (mg/dL) of:    Less than 150 =   0 units          150 -199 =   2 units 200 -249 =   4 units 250 -299 =   6 units 300 -349 =   8 units 350 and above =   10 units If 2 glucose readings are above 200 mg/dL   insulin glargine (LANTUS) 100 unit/mL injection   No No   Si Units by SubCUTAneous route nightly.  Indications: type 2 diabetes mellitus   midodrine (PROAMITINE) 2.5 mg tablet   No No   Si mg [2 tablets] po three time a day for 5 days then 2.5 mg po three time a day x 5 days then stop   ondansetron (ZOFRAN ODT) 4 mg disintegrating tablet   Yes No   Sig: Take 4 mg by mouth every eight (8) hours as needed for Nausea. oxyCODONE-acetaminophen (PERCOCET 7.5) 7.5-325 mg per tablet   No No   Sig: Take 1 Tab by mouth every six (6) hours as needed (for pain level greater than 5/10). Max Daily Amount: 4 Tabs. Indications: Pain   polyethylene glycol (MIRALAX) 17 gram packet   No No   Sig: Take 1 Packet by mouth two (2) times a day. pravastatin (PRAVACHOL) 40 mg tablet   Yes No   Sig: Take 40 mg by mouth nightly. Indications: DYSLIPIDEMIA   senna-docusate (PERICOLACE) 8.6-50 mg per tablet   No No   Sig: Take 2 Tabs by mouth daily (after dinner).  Indications: Constipation      Facility-Administered Medications: None       Current Medications:  Current Facility-Administered Medications   Medication Dose Route Frequency    loperamide (IMODIUM) 1 mg/5 mL oral solution 2 mg  2 mg Oral QID PRN    gabapentin (NEURONTIN) capsule 600 mg  600 mg Oral TID    furosemide (LASIX) tablet 40 mg  40 mg Oral DAILY    diphenhydrAMINE (BENADRYL) capsule 25 mg  25 mg Oral Q6H PRN    sodium chloride (NS) flush 10 mL  10 mL InterCATHeter Q24H    sodium chloride (NS) flush 10 mL  10 mL InterCATHeter PRN    alteplase (CATHFLO) 1 mg in sterile water (preservative free) 1 mL injection  1 mg InterCATHeter PRN    potassium chloride (K-DUR, KLOR-CON) SR tablet 20 mEq  20 mEq Oral BID    ondansetron (ZOFRAN ODT) tablet 4 mg  4 mg Oral Q8H PRN    acetaminophen (TYLENOL) tablet 650 mg  650 mg Oral Q4H PRN    senna-docusate (PERICOLACE) 8.6-50 mg per tablet 2 Tab  2 Tab Oral PCD    cholecalciferol (VITAMIN D3) tablet 2,000 Units  2,000 Units Oral DAILY    oxyCODONE-acetaminophen (PERCOCET 7.5) 7.5-325 mg per tablet 1 Tab  1 Tab Oral Q6H PRN    allopurinol (ZYLOPRIM) tablet 50 mg  50 mg Oral DAILY    midodrine (PROAMITINE) tablet 2.5 mg  2.5 mg Oral TID WITH MEALS    Lactobacillus Acidoph & Bulgar (FLORANEX) tablet 1 Tab  1 Tab Oral BID    folic acid (FOLVITE) tablet 1 mg  1 mg Oral DAILY    insulin glargine (LANTUS) injection 5 Units  5 Units SubCUTAneous QHS    ferrous sulfate tablet 325 mg  325 mg Oral BID WITH MEALS    famotidine (PEPCID) tablet 20 mg  20 mg Oral QHS    polyethylene glycol (MIRALAX) packet 17 g  17 g Oral BID    cefTRIAXone (ROCEPHIN) 2 g in 0.9% sodium chloride (MBP/ADV) 50 mL MBP  2 g IntraVENous Q24H    aluminum-magnesium hydroxide (MAALOX) oral suspension 15 mL  15 mL Oral QID PRN    sodium chloride (NS) flush 5-10 mL  5-10 mL IntraVENous Q8H    albuterol-ipratropium (DUO-NEB) 2.5 MG-0.5 MG/3 ML  3 mL Nebulization Q4H PRN    magnesium oxide (MAG-OX) tablet 400 mg  400 mg Oral BID    diphenhydrAMINE (BENADRYL) capsule 25 mg  25 mg Oral BID    insulin lispro (HUMALOG) injection   SubCUTAneous AC&HS    glucose chewable tablet 16 g  16 g Oral PRN    glucagon (GLUCAGEN) injection 1 mg  1 mg IntraMUSCular PRN    dextrose (D50W) injection syrg 12.5-25 g  25-50 mL IntraVENous PRN    0.9% sodium chloride infusion 250 mL  250 mL IntraVENous PRN       Chart and notes reviewed. Data reviewed. I have evaluated and examined the patient. IMPRESSION:   Patient Active Problem List   Diagnosis Code    Nonischemic cardiomyopathy ([facility] I42.8    History of complete heart block Z86.79    Biventricular implantable cardioverter-defibrillator in situ Z95.810    Left bundle branch block (LBBB) on electrocardiogram I44.7    Type 2 diabetes mellitus with diabetic neuropathy ([facility]) E11.40    Dyslipidemia E78.5    Diabetic neuropathy associated with type 2 diabetes mellitus ([facility] E11.40    Obstructive sleep apnea on CPAP G47.33, Z99.89    AICD generator infection ([facility] T82. 7XXA    Difficult airway for intubation T88. 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 1XXA    Impaired mobility and ADLs Z74.09    History of Coumadin therapy Z79.01    Gout M10.9    Acute paraplegia ([facility]) G82.20    Sepsis ([facility] A41.9    Psoas abscess, right ([facility] K68.12    Krueger catheter in place on admission Z96.0    Urinary tract infection due to Enterococcus N39.0, B95.2    Group B streptococcal infection A49.1    Hypervolemia E87.70    Anemia D64.9 ·         PLAN:/DISCUSSION:   · Dc when bed available        Jeane Soto MD  6/20/2017, 7:46 AM

## 2017-06-20 NOTE — DIABETES MGMT
GLYCEMIC CONTROL PLAN OF CARE    Assessment/Recommendations:  Blood glucose fluctuating above targets  Continue basal insulin coverage  Advance to the very insulin resistant correctional Lispro scale per protocol   Continue inpatient monitoring and intervention    Most recent blood glucose values:  6/19/2017 11:38 6/19/2017 15:38 6/19/2017 22:46 6/20/2017 08:49 6/20/2017 12:01   254 (H) 177 (H) 242 (H) 162 (H) 219 (H)     Current A1C of 8.0% is equivalent to average blood glucose of 183 mg/dl over the past 2-3 months.     Current hospital diabetes medications:   Lantus insulin 5 units every bedtime  Correctional Lispro insulin 4 times daily ACHS (normal insulin sensitivity)    Previous day's insulin requirements:   14 units of correctional Lispro   5 units of Lantus insulin     Home diabetes medications:  Lantus insulin 5 units nightly   Novolog insulin per sliding scale    Diet:  Diabetic consistent carbohydrate    Education:  ____Refer to Diabetes Education Record             __x__Education not indicated at this time      Phani Brady RD, CDE

## 2017-06-20 NOTE — PROGRESS NOTES
PT eval order received and chart reviewed. Pt currently has active bedrest orders conflicting with PT eval. Please D/C bedrest to allow patient participation in eval. Thank you for this referral. Sveta Sierra, PT, DPT.

## 2017-06-20 NOTE — PROGRESS NOTES
Problem: Mobility Impaired (Adult and Pediatric)  Goal: *Acute Goals and Plan of Care (Insert Text)  Physical Therapy Goals  Initiated 6/20/2017 and to be accomplished within 7 day(s)  1. Patient will move from supine to sit and sit to supine   2. Patient will scoot up and down with moderate assistance. 3. Patient will roll side to side in bed with minimal assistance. 4. Patient will demonstrate good sitting balance in order to improve ability to perform functional tasks/HEP sitting EOB. 5. Patient will perform HEP with minimal assistance in order to improve strength and ROM for carryover into functional tasks. PHYSICAL THERAPY EVALUATION     Patient: Montrell Mora (1 y.o. female)  Date: 6/20/2017  Primary Diagnosis: Anemia  Hypervolemia  Anemia        Precautions: Fall         ASSESSMENT :  Pt is 71yo F admitted to hospital for anemia and hypervolemia. Pt presents today alert and agreeable to therapy and objective assessment performed supine. Pt then transferred from supine to sit and sat EOB for further objective assessment. Pt balance sitting EOB was fair and pt required UE support. Pt then transferred back to supine and bed in trendelenburg to assist pt towards HOB. Pt used BUE's to assist in pulling herself up in bed. Pt was left resting supine with call bell by her side and pillows positioned on lateral side of LLE to decreased ER of hip. Pillows placed under BLE's for positing. Pt requested that SCD's be left off to allow her legs \"to breathe\". PT instructed pt to leave stockings off for no more than one hour before calling for assistance to place SCD's on; pt acknowledged understanding. Pt currently demonstrated decreased strength, mobility, and endurance and  will benefit from skilled intervention to address the above impairments.   Patients rehabilitation potential is considered to be Fair  Factors which may influence rehabilitation potential include:   [ ]         None noted  [ ]         Mental ability/status  [X]         Medical condition  [X]         Home/family situation and support systems  [ ]         Safety awareness  [X]         Pain tolerance/management  [ ]         Other:        PLAN :  Recommendations and Planned Interventions:  [X]           Bed Mobility Training             [X]    Neuromuscular Re-Education  [X]           Transfer Training                   [ ]    Orthotic/Prosthetic Training  [X]           Gait Training                          [ ]    Modalities  [X]           Therapeutic Exercises          [ ]    Edema Management/Control  [X]           Therapeutic Activities            [X]    Patient and Family Training/Education  [ ]           Other (comment):     Frequency/Duration: Patient will be followed by physical therapy 1-2 times per day/4-7 days per week to address goals. Discharge Recommendations: Naveen Jamesuel  Further Equipment Recommendations for Discharge: N/A       G-CODES      Mobility  Current  CK= 40-59%   Goal  CJ= 20-39%. The severity rating is based on the Level of Assistance required for Functional Mobility and ADLs.            G-CODES      Eval Complexity: History: MEDIUM  Complexity : 1-2 comorbidities / personal factors will impact the outcome/ POC Exam:LOW Complexity : 1-2 Standardized tests and measures addressing body structure, function, activity limitation and / or participation in recreation  Presentation: LOW Complexity : Stable, uncomplicated  Clinical Decision Making:Low Complexity   Overall Complexity:LOW       SUBJECTIVE:   Patient stated I just take things one day at a time.       OBJECTIVE DATA SUMMARY:       Past Medical History:   Diagnosis Date    Acute paraplegia ([facility] 4/20/2017    Benign hypertensive heart disease with systolic CHF, NYHA class 2 ([facility] 9/5/2012    Biventricular implantable cardioverter-defibrillator in situ 04/28/2005     Upgraded to BiV AICD; gen change 4/2008; pocket revision 10/2009;  Abdominal - done on 8/22/2012 by Dr. Mirza Castanon Cardiac cath 08/15/1996     Patent coronaries. Elev LVEDP. EF 50-55%.  Cardiac echocardiogram 06/23/2015     Ltd study. EF 45-50%. Mild, diffuse hypk. Severe apical hypk. No mass or thrombus was clearly identified, although imaging was suboptimal.      Cardiac nuclear imaging test 06/19/2015     Fixed distal apical, distal septal defect more likely due to RV pacing than prior infarct. No ischemia. EF 46%. RWMA c/w RV pacing. Nondiagnostic EKG on pharm stress test.      Cardiovascular lower extremity venous duplex 09/04/2012     Acute, non-occlusive DVT in CFV on right. No DVT on left. No superficial thrombosis bilaterally.  Cardiovascular upper extremity venous duplex 08/27/2012     DVT in axillary vein on left. Left subclavian was not visualized.     Chronic anemia 9/5/2012    Chronic systolic heart failure (HCC)      Decreased calculated glomerular filtration rate (GFR) 3/30/2017     Calculated GFR equivalent to that of CKD stage 3 = 30-59 ml/min    Diabetic neuropathy associated with type 2 diabetes mellitus ([address] 6/28/2011    Difficult airway for intubation 08/22/2012     see anesthesia airway note    Dyslipidemia 6/28/2011    Gout      History of complete heart block 6/28/2011    History of Coumadin therapy       Anticoagulation for DVT of the LUE; Discontinued on 3/30/2017    History of deep venous thrombosis 9/5/2012     Left upper extremity    History of pyelonephritis 3/30/2017    Left bundle branch block (LBBB) on electrocardiogram 6/28/2011    Nonischemic cardiomyopathy ([address] 6/28/2011    Obesity (BMI 35.0-39.9 without comorbidity) ([address] 3/13/2017    Obstructive sleep apnea on CPAP 2/7/2012    Psoas abscess, right ([address] 4/20/2017    Psoas hematoma, right, secondary to anticoagulant therapy 3/30/2017    Type 2 diabetes mellitus with diabetic neuropathy ([address] 6/28/2011     Past Surgical History:   Procedure Laterality Date    HX CARPAL TUNNEL RELEASE   4/07     right     HX CHOLECYSTECTOMY   1994    HX HYSTERECTOMY   1973    HX OTHER SURGICAL   6/11/2012     AICD revision    HX PACEMAKER   4/28/2005     [employer] AICD     Prior Level of Function/Home Situation: Pt came from SNF where she was working on balance, bed mobility, and was being transferred into a  via [employer]. Pt was also working on activities to improve trunk strength in bed and in R [address]. Home Situation  Home Environment: [address] [place] [address] Name: [facility]  # Steps to Enter: 0  One/Two Story Residence: One story  Living Alone: No  Support Systems: Family member(s)  Patient Expects to be Discharged to[de-identified] Skilled nursing facility  Current DME Used/Available at Home: None  Critical Behavior:   A&Ox4    Strength:    Strength: Generally decreased, functional (BLE 1/5; L diminished relative to R)     Tone & Sensation:    Sensation: Impaired (BLE intact to pressure and pain, diminished to LT L>R)   Range Of Motion:  AROM: Generally decreased, functional (BLE L>R)   Functional Mobility:  Bed Mobility:   Supine to Sit: Moderate assistance (assist with BLE)  Sit to Supine: Moderate assistance  Scooting: Minimum assistance (pt assisted with BUE's on HR's and bed in trendelenberg)  Balance:   Sitting: Impaired; With support  Sitting - Static: Good (unsupported)  Sitting - Dynamic: Fair (occasional)  Pain:  Pt reports 0/10 pain or discomfort prior to treatment. Pt reports 4/10 pain or discomfort post treatment. (BLE during activity)     Activity Tolerance:   Pt tolerated activity well and was left resting with call bell by her side. Please refer to the flowsheet for vital signs taken during this treatment.   After treatment:   [ ]         Patient left in no apparent distress sitting up in chair  [X]         Patient left in no apparent distress in bed  [X]         Call bell left within reach  [ ]         Nursing notified  [ ]         Caregiver present  [ ] Bed alarm activated      COMMUNICATION/EDUCATION:   [X]         Fall prevention education was provided and the patient/caregiver indicated understanding. [X]         Patient/family have participated as able in goal setting and plan of care. [X]         Patient/family agree to work toward stated goals and plan of care. [ ]         Patient understands intent and goals of therapy, but is neutral about his/her participation. [ ]         Patient is unable to participate in goal setting and plan of care.      Thank you for this referral.  Delia Dela Cruz, PT   Time Calculation: 23 mins

## 2017-06-21 LAB
GLUCOSE BLD STRIP.AUTO-MCNC: 140 MG/DL (ref 70–110)
GLUCOSE BLD STRIP.AUTO-MCNC: 197 MG/DL (ref 70–110)
GLUCOSE BLD STRIP.AUTO-MCNC: 226 MG/DL (ref 70–110)
GLUCOSE BLD STRIP.AUTO-MCNC: 246 MG/DL (ref 70–110)

## 2017-06-21 PROCEDURE — 77030033263 HC DRSG MEPILEX 16-48IN BORD MOLN -B

## 2017-06-21 PROCEDURE — 82962 GLUCOSE BLOOD TEST: CPT

## 2017-06-21 PROCEDURE — 74011250636 HC RX REV CODE- 250/636: Performed by: FAMILY MEDICINE

## 2017-06-21 PROCEDURE — 77030027138 HC INCENT SPIROMETER -A

## 2017-06-21 PROCEDURE — 74011250637 HC RX REV CODE- 250/637: Performed by: FAMILY MEDICINE

## 2017-06-21 PROCEDURE — 74011636637 HC RX REV CODE- 636/637: Performed by: FAMILY MEDICINE

## 2017-06-21 PROCEDURE — 97165 OT EVAL LOW COMPLEX 30 MIN: CPT

## 2017-06-21 PROCEDURE — 65660000000 HC RM CCU STEPDOWN

## 2017-06-21 PROCEDURE — 74011000258 HC RX REV CODE- 258: Performed by: FAMILY MEDICINE

## 2017-06-21 PROCEDURE — 97110 THERAPEUTIC EXERCISES: CPT

## 2017-06-21 RX ADMIN — Medication 10 ML: at 06:43

## 2017-06-21 RX ADMIN — MIDODRINE HYDROCHLORIDE 2.5 MG: 2.5 TABLET ORAL at 08:40

## 2017-06-21 RX ADMIN — INSULIN GLARGINE 5 UNITS: 100 INJECTION, SOLUTION SUBCUTANEOUS at 23:39

## 2017-06-21 RX ADMIN — Medication 400 MG: at 08:42

## 2017-06-21 RX ADMIN — VITAMIN D, TAB 1000IU (100/BT) 2000 UNITS: 25 TAB at 08:41

## 2017-06-21 RX ADMIN — Medication 10 ML: at 15:00

## 2017-06-21 RX ADMIN — ALLOPURINOL 50 MG: 100 TABLET ORAL at 08:41

## 2017-06-21 RX ADMIN — FOLIC ACID 1 MG: 1 TABLET ORAL at 08:41

## 2017-06-21 RX ADMIN — INSULIN LISPRO 3 UNITS: 100 INJECTION, SOLUTION INTRAVENOUS; SUBCUTANEOUS at 23:40

## 2017-06-21 RX ADMIN — GABAPENTIN 600 MG: 300 CAPSULE ORAL at 18:04

## 2017-06-21 RX ADMIN — LACTOBACILLUS TAB 1 TABLET: TAB at 08:41

## 2017-06-21 RX ADMIN — DIPHENHYDRAMINE HYDROCHLORIDE 25 MG: 25 CAPSULE ORAL at 18:05

## 2017-06-21 RX ADMIN — POTASSIUM CHLORIDE 20 MEQ: 20 TABLET, EXTENDED RELEASE ORAL at 08:41

## 2017-06-21 RX ADMIN — INSULIN LISPRO 6 UNITS: 100 INJECTION, SOLUTION INTRAVENOUS; SUBCUTANEOUS at 18:05

## 2017-06-21 RX ADMIN — Medication 400 MG: at 18:05

## 2017-06-21 RX ADMIN — FERROUS SULFATE TAB 325 MG (65 MG ELEMENTAL FE) 325 MG: 325 (65 FE) TAB at 08:41

## 2017-06-21 RX ADMIN — Medication 10 ML: at 23:43

## 2017-06-21 RX ADMIN — LOPERAMIDE HYDROCHLORIDE 2 MG: 2 SOLUTION ORAL at 01:22

## 2017-06-21 RX ADMIN — LACTOBACILLUS TAB 1 TABLET: TAB at 18:04

## 2017-06-21 RX ADMIN — POTASSIUM CHLORIDE 20 MEQ: 20 TABLET, EXTENDED RELEASE ORAL at 18:05

## 2017-06-21 RX ADMIN — DIPHENHYDRAMINE HYDROCHLORIDE 25 MG: 25 CAPSULE ORAL at 08:41

## 2017-06-21 RX ADMIN — GABAPENTIN 600 MG: 300 CAPSULE ORAL at 08:41

## 2017-06-21 RX ADMIN — MIDODRINE HYDROCHLORIDE 2.5 MG: 2.5 TABLET ORAL at 18:03

## 2017-06-21 RX ADMIN — GABAPENTIN 600 MG: 300 CAPSULE ORAL at 23:38

## 2017-06-21 RX ADMIN — LOPERAMIDE HYDROCHLORIDE 2 MG: 2 SOLUTION ORAL at 08:40

## 2017-06-21 RX ADMIN — FUROSEMIDE 40 MG: 40 TABLET ORAL at 08:41

## 2017-06-21 RX ADMIN — FERROUS SULFATE TAB 325 MG (65 MG ELEMENTAL FE) 325 MG: 325 (65 FE) TAB at 18:03

## 2017-06-21 RX ADMIN — Medication 10 ML: at 14:00

## 2017-06-21 RX ADMIN — CEFTRIAXONE 2 G: 2 INJECTION, POWDER, FOR SOLUTION INTRAMUSCULAR; INTRAVENOUS at 01:23

## 2017-06-21 RX ADMIN — FAMOTIDINE 20 MG: 20 TABLET ORAL at 23:38

## 2017-06-21 RX ADMIN — LOPERAMIDE HYDROCHLORIDE 2 MG: 2 SOLUTION ORAL at 18:07

## 2017-06-21 NOTE — PROGRESS NOTES
Care Management Interventions  Mode of Transport at Discharge: BLS  Transition of Care Consult (CM Consult): SNF  Partner SNF: No  Reason Why Partner SNF Not Chosen: Positive previous encounter, Friend/family recommendation  Current Support Network: Lives with Spouse  Plan discussed with Pt/Family/Caregiver: Yes  Discharge Location  Discharge Placement: Skilled nursing facility     CM was informed by admission coordinator at [address] that authorization has been obtained for pt's transfer to facility. Physician informed. Discharge is anticipated for tomorrow.

## 2017-06-21 NOTE — PROGRESS NOTES
Problem: Self Care Deficits Care Plan (Adult)  Goal: *Acute Goals and Plan of Care (Insert Text)  Occupational Therapy Goals  Initiated 6/21/2017 within 7 day(s). 1. Patient will perform grooming with modified independence while sitting on the edge of the bed  2. Patient will maneuver in bed with min to mod assistance in preparation for her participation in her self care routine  3. Patient will demonstrate independence with scapular strengthening/setting program in preparation for efficient UB while maneuvering during her self care routine  OCCUPATIONAL THERAPY EVALUATION     Patient: Theo Davalos (20 y.o. female)  Date: 6/21/2017  Primary Diagnosis: Anemia  Hypervolemia  Anemia  Precautions: fall         ASSESSMENT :  Based on the objective data described below, the patient presents with decreased functional mobility, decreased balance in sitting and decreased UB strength which limits her self care skills. She is beginning with shoulder pain (unrated); upon observation, she is relying on her ligaments as she works at maneuvering her body and was instructed on scapular strengthening and setting program and following training is independent return demonstration. Following treatment, this patient increased to being able to sit on the edge of the bed holding on with one hand ofr 45 seconds. This is in preparation for her completion of her grooming tasks while sitting on the edge of the bed. She fatigues following one trial of this. Patient will benefit from skilled intervention to address the above impairments.   Patients rehabilitation potential is considered to be Good  Factors which may influence rehabilitation potential include:   [X]             None noted  [ ]             Mental ability/status  [ ]             Medical condition  [ ]             Home/family situation and support systems  [ ]             Safety awareness  [ ]             Pain tolerance/management  [ ]             Other:        PLAN :  Recommendations and Planned Interventions:  [X]               Self Care Training                  [X]        Therapeutic Activities  [ ]               Functional Mobility Training    [ ]        Cognitive Retraining  [X]               Therapeutic Exercises           [X]        Endurance Activities  [X]               Balance Training                   [ ]        Neuromuscular Re-Education  [ ]               Visual/Perceptual Training     [ ]   Home Safety Training  [X]               Patient Education                 [X]        Family Training/Education  [ ]               Other (comment):     Frequency/Duration: Patient will be followed by occupational therapy 1-2 times per day/4-7 days per week to address goals. Discharge Recommendations: Rehab  Further Equipment Recommendations for Discharge: N/A       PATIENT COMPLEXITY      Eval Complexity: History: LOW Complexity : Brief history review ; Examination: LOW Complexity : 1-3 performance deficits relating to physical, cognitive , or psychosocial skils that result in activity limitations and / or participation restrictions ; Decision Making:LOW Complexity : No comorbidities that affect functional and no verbal or physical assistance needed to complete eval tasks  Assessment: LOW Complexity       G-CODES:      Self Care  Current  CM= 80-99%   Goal  CK= 40-59%. The severity rating is based on the Level of Assistance required for Functional Mobility and ADLs. SUBJECTIVE:   Patient stated I think I have been improving since I was at the rehab center.       OBJECTIVE DATA SUMMARY:       Past Medical History:   Diagnosis Date    Acute paraplegia ([facility] 4/20/2017    Benign hypertensive heart disease with systolic CHF, NYHA class 2 ([facility] 9/5/2012    Biventricular implantable cardioverter-defibrillator in situ 04/28/2005     Upgraded to BiV AICD; gen change 4/2008; pocket revision 10/2009;  Abdominal - done on 8/22/2012 by Dr. Jj Dominguez Cardiac cath 08/15/1996     Patent coronaries. Elev LVEDP. EF 50-55%.  Cardiac echocardiogram 06/23/2015     Ltd study. EF 45-50%. Mild, diffuse hypk. Severe apical hypk. No mass or thrombus was clearly identified, although imaging was suboptimal.      Cardiac nuclear imaging test 06/19/2015     Fixed distal apical, distal septal defect more likely due to RV pacing than prior infarct. No ischemia. EF 46%. RWMA c/w RV pacing. Nondiagnostic EKG on pharm stress test.      Cardiovascular lower extremity venous duplex 09/04/2012     Acute, non-occlusive DVT in CFV on right. No DVT on left. No superficial thrombosis bilaterally.  Cardiovascular upper extremity venous duplex 08/27/2012     DVT in axillary vein on left. Left subclavian was not visualized.     Chronic anemia 9/5/2012    Chronic systolic heart failure (HCC)      Decreased calculated glomerular filtration rate (GFR) 3/30/2017     Calculated GFR equivalent to that of CKD stage 3 = 30-59 ml/min    Diabetic neuropathy associated with type 2 diabetes mellitus ([address] 6/28/2011    Difficult airway for intubation 08/22/2012     see anesthesia airway note    Dyslipidemia 6/28/2011    Gout      History of complete heart block 6/28/2011    History of Coumadin therapy       Anticoagulation for DVT of the LUE; Discontinued on 3/30/2017    History of deep venous thrombosis 9/5/2012     Left upper extremity    History of pyelonephritis 3/30/2017    Left bundle branch block (LBBB) on electrocardiogram 6/28/2011    Nonischemic cardiomyopathy ([address] 6/28/2011    Obesity (BMI 35.0-39.9 without comorbidity) ([address] 3/13/2017    Obstructive sleep apnea on CPAP 2/7/2012    Psoas abscess, right ([address] 4/20/2017    Psoas hematoma, right, secondary to anticoagulant therapy 3/30/2017    Type 2 diabetes mellitus with diabetic neuropathy ([address] 6/28/2011     Past Surgical History:   Procedure Laterality Date    HX CARPAL TUNNEL RELEASE   4/07     right     HX CHOLECYSTECTOMY   1994    HX HYSTERECTOMY   1973    HX OTHER SURGICAL   6/11/2012     AICD revision    HX PACEMAKER   4/28/2005     [employer] AICD     Prior Level of Function/Home Situation: she reports she was independent prior to her initial admission  [address]: [address] [place] [address] Name: [facility]  # Steps to Enter: 0  One/Two Story Residence: One story  Living Alone: No  Support Systems: Family member(s)  Patient Expects to be Discharged to[de-identified] Skilled nursing facility  Current DME Used/Available at Home: None  [X]  Right hand dominant          [ ]  Left hand dominant  Cognitive/Behavioral Status:   she is alert, oriented X 4   Skin: no skin integrity issues noted during OT eval; she is on a pressure relieving bed  Edema: no extremity edema is noted  Vision/Perceptual:     tracking is WFL     Coordination:   Darling WFL   Balance:   min assist for unsupported sitting; she requires assistance of 2 hands sitting on the edge of the bed  Strength:  DRE's: 4/5 with scapular strength 4- to 3+/5   Tone & Sensation:  Darling WFL   Range of Motion:  Darling AROM is [facility]   Functional Mobility and Transfers for ADLs:  Bed Mobility:   supine to sit and return to supine requires max assist for LE management and mod assist for UB management   ADL Assessment:   self feeding: independent  Grooming: set up and min to mod assist for unsupported sitting on the edge of the bed  UB bathing/dressing: min to mod assist while sitting on the edge of the bed (for her balance)  LB bathing/dressing: total assist  Therapeutic Exercise:  As noted above  Pain:  She is reporting mild unrated right shoulder pain that does not change from prior to OT session to following OT session  Activity Tolerance:   No SOB noted; she is reporting generalized fatigue  Please refer to the flowsheet for vital signs taken during this treatment.   After treatment:   [ ] Patient left in no apparent distress sitting up in chair  [X] Patient left in no apparent distress in bed  [X] Call bell left within reach  [ ] Nursing notified  [ ] Caregiver present  [ ] Bed alarm activated      COMMUNICATION/EDUCATION:   [ ] Home safety education was provided and the patient/caregiver indicated understanding. [ ] Patient/family have participated as able in goal setting and plan of care. [X] Patient/family agree to work toward stated goals and plan of care. [ ] Patient understands intent and goals of therapy, but is neutral about his/her participation. [ ] Patient is unable to participate in goal setting and plan of care.      Thank you for this referral.  Brooks Kumar, OTR/L  Time Calculation: 24 mins

## 2017-06-21 NOTE — PROGRESS NOTES
Amos Huber M.D. PROGRESS NOTE    Name: Stefanie Donald MRN: [id number]   : 1950 Hospital: [facility]   Date: 2017  Admission Date: 2017     Subjective/Objective/Plans  1. Psoas infection  2. Paraplegia  3. DM  4. HLD    Having some leg movements, cramps are less  Chronic left upper quadrant pain, gastroparesis, gastric pacemaker  BS fair    Vital Signs:  Visit Vitals    /63 (BP 1 Location: Left arm, BP Patient Position: At rest)    Pulse 78    Temp 98.3 °F (36.8 °C)    Resp 18    Ht 5' 7\" (1.702 m)    Wt 106.8 kg (235 lb 8 oz)    SpO2 99%    Breastfeeding No    BMI 36.88 kg/m2       O2 Device: Room air       Temp (24hrs), Av.8 °F (37.1 °C), Min:97.8 °F (36.6 °C), Max:99.6 °F (37.6 °C)     12:19 PM  Intake/Output:   Last shift:       07 -  190  In: 360 [P.O.:360]  Out: 175 [Urine:175]  Last 3 shifts:  190 -  0700  In: 1540 [P.O.:1440; I.V.:100]  Out: 3200 [Urine:3200]    Intake/Output Summary (Last 24 hours) at 17 1219  Last data filed at 17 0847   Gross per 24 hour   Intake             1660 ml   Output             2625 ml   Net             -965 ml         Physical Exam:    General: in no apparent distress    HEENT: pupils equal, no ear discharge   Neck: No abnormally enlarged lymph nodes. , no JDV   Mouth: MMM no lesions    Chest: normal, no breast masses   Lungs: normal air entry   Heart: Regular rate and rhythm   Abdomen: abdomen is soft without significant tenderness, masses, organomegaly or guarding   Extremity: paraplegia   Neuro: alert    Skin: Skin color, texture, turgor normal. No rashes or lesions    Data Review:    Labs: Results:       Chemistry Recent Labs      17   0500   GLU  153*   NA  138   K  4.2   CL  103   CO2  28   BUN  23*   CREA  1.09   CA  9.6   AGAP  7   BUCR  21*      CBC w/Diff Recent Labs      17   0500   WBC  6.2   RBC  3.29*   HGB  8.8*   HCT  26.9*   PLT  237   GRANS  52   LYMPH  36   EOS  3 Cardiac Enzymes No results for input(s): CPK, CKND1, CARLI in the last 72 hours. No lab exists for component: CKRMB, TROIP   Coagulation No results for input(s): PTP, INR, APTT in the last 72 hours. No lab exists for component: INREXT    Lipid Panel Lab Results   Component Value Date/Time    Cholesterol, total 154 07/24/2012 01:00 AM    HDL Cholesterol 48 07/24/2012 01:00 AM    LDL, calculated 90.4 07/24/2012 01:00 AM    VLDL, calculated 15.6 07/24/2012 01:00 AM    Triglyceride 78 07/24/2012 01:00 AM    CHOL/HDL Ratio 3.2 07/24/2012 01:00 AM      BNP No results for input(s): BNPP in the last 72 hours. Liver Enzymes No results for input(s): TP, ALB, TBILI, AP, SGOT, ALT, CBIL in the last 72 hours. Thyroid Studies Lab Results   Component Value Date/Time    TSH 0.51 04/20/2017 07:26 PM          Procedures/imaging: see electronic medical records for all procedures, Xrays and details which were not copied into this note but were reviewed. Allergies:   Allergies   Allergen Reactions    Vancomycin Itching    Ampicillin Itching    Bactrim [Sulfamethoxazole-Trimethoprim] Unknown (comments)    Blueberry Swelling     Causes throat swelling    Ciprofloxacin Itching    Codeine Other (comments)     Jumpy feeling    Crestor [Rosuvastatin] Itching    Darvocet A500 [Propoxyphene N-Acetaminophen] Itching    Demerol [Meperidine] Itching    Levaquin [Levofloxacin] Itching    Lipitor [Atorvastatin] Myalgia    Magnesium Oxide Itching     nausea    Minocin [Minocycline] Unknown (comments)    Pcn [Penicillins] Itching    Pravachol [Pravastatin] Swelling     Swelling in mouth     Shellfish Derived Unknown (comments)    Sulfa (Sulfonamide Antibiotics) Itching    Ultracet [Tramadol-Acetaminophen] Itching    Vicodin [Hydrocodone-Acetaminophen] Unknown (comments)    Vytorin 10-10 [Ezetimibe-Simvastatin] Myalgia    Percodan [Oxycodone Hcl-Oxycodone-Asa] Itching       Home Medications:  Prior to Admission Medications   Prescriptions Last Dose Informant Patient Reported? Taking? Lactobacillus Acidoph & Bulgar (FLORANEX) 1 million cell tab tablet   No No   Sig: Take 1 Tab by mouth two (2) times a day for 21 days. acetaminophen (TYLENOL) 325 mg tablet   No No   Sig: Take 2 Tabs by mouth every four (4) hours as needed (for fever or pain level less than 5/10). Indications: Fever, Pain   albuterol-ipratropium (DUO-NEB) 2.5 mg-0.5 mg/3 ml nebu   No No   Sig: 3 mL by Nebulization route four (4) times daily. allopurinol (ZYLOPRIM) 100 mg tablet   No No   Sig: Take 0.5 Tabs by mouth daily. Indications: HYPERURICEMIA   aluminum-magnesium hydroxide (MAALOX) 200-200 mg/5 mL suspension   No No   Sig: Take 15 mL by mouth four (4) times daily as needed for Indigestion. cefTRIAXone 2 gram 2 g IVPB   No No   Si g by IntraVENous route every twenty-four (24) hours for 20 days. cholecalciferol (VITAMIN D3) 1,000 unit tablet   No No   Sig: Take 2 Tabs by mouth daily. Indications: PREVENTION OF VITAMIN D DEFICIENCY   famotidine (PEPCID) 20 mg tablet   No No   Sig: Take 1 Tab by mouth nightly. ferrous sulfate 325 mg (65 mg iron) tablet   No No   Sig: Take 1 Tab by mouth two (2) times daily (with meals) for 14 days. folic acid (FOLVITE) 1 mg tablet   No No   Sig: Take 1 Tab by mouth daily. insulin aspart (NOVOLOG) 100 unit/mL injection   No No   Sig: INITIATE INSULIN CORRECTIVE PROTOCOL (HR): Normal Insulin Sensitivity  For Blood Sugar (mg/dL) of:    Less than 150 =   0 units          150 -199 =   2 units 200 -249 =   4 units 250 -299 =   6 units 300 -349 =   8 units 350 and above =   10 units If 2 glucose readings are above 200 mg/dL   insulin glargine (LANTUS) 100 unit/mL injection   No No   Si Units by SubCUTAneous route nightly.  Indications: type 2 diabetes mellitus   midodrine (PROAMITINE) 2.5 mg tablet   No No   Si mg [2 tablets] po three time a day for 5 days then 2.5 mg po three time a day x 5 days then stop   ondansetron (ZOFRAN ODT) 4 mg disintegrating tablet   Yes No   Sig: Take 4 mg by mouth every eight (8) hours as needed for Nausea. oxyCODONE-acetaminophen (PERCOCET 7.5) 7.5-325 mg per tablet   No No   Sig: Take 1 Tab by mouth every six (6) hours as needed (for pain level greater than 5/10). Max Daily Amount: 4 Tabs. Indications: Pain   polyethylene glycol (MIRALAX) 17 gram packet   No No   Sig: Take 1 Packet by mouth two (2) times a day. pravastatin (PRAVACHOL) 40 mg tablet   Yes No   Sig: Take 40 mg by mouth nightly. Indications: DYSLIPIDEMIA   senna-docusate (PERICOLACE) 8.6-50 mg per tablet   No No   Sig: Take 2 Tabs by mouth daily (after dinner).  Indications: Constipation      Facility-Administered Medications: None       Current Medications:  Current Facility-Administered Medications   Medication Dose Route Frequency    loperamide (IMODIUM) 1 mg/5 mL oral solution 2 mg  2 mg Oral QID PRN    gabapentin (NEURONTIN) capsule 600 mg  600 mg Oral TID    furosemide (LASIX) tablet 40 mg  40 mg Oral DAILY    diphenhydrAMINE (BENADRYL) capsule 25 mg  25 mg Oral Q6H PRN    sodium chloride (NS) flush 10 mL  10 mL InterCATHeter Q24H    sodium chloride (NS) flush 10 mL  10 mL InterCATHeter PRN    alteplase (CATHFLO) 1 mg in sterile water (preservative free) 1 mL injection  1 mg InterCATHeter PRN    potassium chloride (K-DUR, KLOR-CON) SR tablet 20 mEq  20 mEq Oral BID    ondansetron (ZOFRAN ODT) tablet 4 mg  4 mg Oral Q8H PRN    acetaminophen (TYLENOL) tablet 650 mg  650 mg Oral Q4H PRN    senna-docusate (PERICOLACE) 8.6-50 mg per tablet 2 Tab  2 Tab Oral PCD    cholecalciferol (VITAMIN D3) tablet 2,000 Units  2,000 Units Oral DAILY    oxyCODONE-acetaminophen (PERCOCET 7.5) 7.5-325 mg per tablet 1 Tab  1 Tab Oral Q6H PRN    allopurinol (ZYLOPRIM) tablet 50 mg  50 mg Oral DAILY    midodrine (PROAMITINE) tablet 2.5 mg  2.5 mg Oral TID WITH MEALS    Lactobacillus Acidoph & Bulgar (FLORANEX) tablet 1 Tab  1 Tab Oral BID    folic acid (FOLVITE) tablet 1 mg  1 mg Oral DAILY    insulin glargine (LANTUS) injection 5 Units  5 Units SubCUTAneous QHS    ferrous sulfate tablet 325 mg  325 mg Oral BID WITH MEALS    famotidine (PEPCID) tablet 20 mg  20 mg Oral QHS    polyethylene glycol (MIRALAX) packet 17 g  17 g Oral BID    cefTRIAXone (ROCEPHIN) 2 g in 0.9% sodium chloride (MBP/ADV) 50 mL MBP  2 g IntraVENous Q24H    aluminum-magnesium hydroxide (MAALOX) oral suspension 15 mL  15 mL Oral QID PRN    sodium chloride (NS) flush 5-10 mL  5-10 mL IntraVENous Q8H    albuterol-ipratropium (DUO-NEB) 2.5 MG-0.5 MG/3 ML  3 mL Nebulization Q4H PRN    magnesium oxide (MAG-OX) tablet 400 mg  400 mg Oral BID    diphenhydrAMINE (BENADRYL) capsule 25 mg  25 mg Oral BID    insulin lispro (HUMALOG) injection   SubCUTAneous AC&HS    glucose chewable tablet 16 g  16 g Oral PRN    glucagon (GLUCAGEN) injection 1 mg  1 mg IntraMUSCular PRN    dextrose (D50W) injection syrg 12.5-25 g  25-50 mL IntraVENous PRN    0.9% sodium chloride infusion 250 mL  250 mL IntraVENous PRN       Chart and notes reviewed. Data reviewed. I have evaluated and examined the patient. IMPRESSION:   Patient Active Problem List   Diagnosis Code    Nonischemic cardiomyopathy (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 1XXA    Impaired mobility and ADLs Z74.09    History of Coumadin therapy Z79.01    Gout M10.9    Acute paraplegia ([facility]) G82.20    Sepsis ([address] A41.9    Psoas abscess, right ([address] K68.12    Krueger catheter in place on admission Z96.0    Urinary tract infection due to Enterococcus N39.0, B95.2    Group B streptococcal infection A49.1    Hypervolemia E87.70    Anemia D64.9 ·         PLAN:/DISCUSSION:   · DC when bed available        Rosa Rodriguez MD  6/21/2017, 12:19 PM

## 2017-06-21 NOTE — ROUTINE PROCESS
06/21/17 @ 0643 Pt with uneventful shift, all needs met, all medication given per [facility]     06/21/17 @ 0525 Rounding, no needs noted. 06/21/17 @ 0445 Reassessment no changes noted, call be remains within easy reach, will continue to monitor. 06/21/17 @ 0340 Rounding, no needs. The documentation for this period is being entered following the guidelines as defined in the [address] [place] [address] downtime policy by Brayan Joe RN.    06/21/17 @ 0123 Rounding, no needs, will continue to monitor. 06/21/17 @ 0045 Reassessment no changes. 06/20/17 @ 2247 Rounding, no needs, medication per [facility], will continue to monitor. 06/20/17 @ 2045 Assessment completed, see flow sheet. Pt remains pain free with no needs, will continue to monitor. 06/20/17 @ 1932 Assumed care of patient, pt in bed with  at bedside. Chest rise and fall equally no respiratory distress noted at this time. No complaints of pain noted, assessment to follow.

## 2017-06-21 NOTE — PROGRESS NOTES
Bedside and Verbal shift change report given to Ishaan Troncoso RN (oncoming nurse) by MATEO Cabrera RN (offgoing nurse). Report given with SBAR, Kardex, Intake/Output and Recent Results.

## 2017-06-22 VITALS
DIASTOLIC BLOOD PRESSURE: 70 MMHG | TEMPERATURE: 99 F | RESPIRATION RATE: 20 BRPM | OXYGEN SATURATION: 99 % | HEART RATE: 72 BPM | WEIGHT: 234 LBS | SYSTOLIC BLOOD PRESSURE: 114 MMHG | HEIGHT: 67 IN | BODY MASS INDEX: 36.73 KG/M2

## 2017-06-22 LAB
GLUCOSE BLD STRIP.AUTO-MCNC: 150 MG/DL (ref 70–110)
GLUCOSE BLD STRIP.AUTO-MCNC: 227 MG/DL (ref 70–110)

## 2017-06-22 PROCEDURE — 82962 GLUCOSE BLOOD TEST: CPT

## 2017-06-22 PROCEDURE — 74011250636 HC RX REV CODE- 250/636: Performed by: FAMILY MEDICINE

## 2017-06-22 PROCEDURE — 74011250637 HC RX REV CODE- 250/637: Performed by: FAMILY MEDICINE

## 2017-06-22 PROCEDURE — 74011636637 HC RX REV CODE- 636/637: Performed by: FAMILY MEDICINE

## 2017-06-22 PROCEDURE — 74011000258 HC RX REV CODE- 258: Performed by: FAMILY MEDICINE

## 2017-06-22 RX ADMIN — POTASSIUM CHLORIDE 20 MEQ: 20 TABLET, EXTENDED RELEASE ORAL at 09:52

## 2017-06-22 RX ADMIN — LACTOBACILLUS TAB 1 TABLET: TAB at 09:51

## 2017-06-22 RX ADMIN — FOLIC ACID 1 MG: 1 TABLET ORAL at 09:52

## 2017-06-22 RX ADMIN — MIDODRINE HYDROCHLORIDE 2.5 MG: 2.5 TABLET ORAL at 09:51

## 2017-06-22 RX ADMIN — VITAMIN D, TAB 1000IU (100/BT) 2000 UNITS: 25 TAB at 09:51

## 2017-06-22 RX ADMIN — GABAPENTIN 600 MG: 300 CAPSULE ORAL at 09:50

## 2017-06-22 RX ADMIN — CEFTRIAXONE 2 G: 2 INJECTION, POWDER, FOR SOLUTION INTRAMUSCULAR; INTRAVENOUS at 01:19

## 2017-06-22 RX ADMIN — MIDODRINE HYDROCHLORIDE 2.5 MG: 2.5 TABLET ORAL at 12:05

## 2017-06-22 RX ADMIN — ALLOPURINOL 50 MG: 100 TABLET ORAL at 09:50

## 2017-06-22 RX ADMIN — INSULIN LISPRO 6 UNITS: 100 INJECTION, SOLUTION INTRAVENOUS; SUBCUTANEOUS at 11:30

## 2017-06-22 RX ADMIN — FUROSEMIDE 40 MG: 40 TABLET ORAL at 09:52

## 2017-06-22 RX ADMIN — FERROUS SULFATE TAB 325 MG (65 MG ELEMENTAL FE) 325 MG: 325 (65 FE) TAB at 09:51

## 2017-06-22 RX ADMIN — Medication 400 MG: at 09:58

## 2017-06-22 RX ADMIN — INSULIN LISPRO 3 UNITS: 100 INJECTION, SOLUTION INTRAVENOUS; SUBCUTANEOUS at 07:30

## 2017-06-22 RX ADMIN — DIPHENHYDRAMINE HYDROCHLORIDE 25 MG: 25 CAPSULE ORAL at 09:51

## 2017-06-22 RX ADMIN — Medication 10 ML: at 07:00

## 2017-06-22 NOTE — ROUTINE PROCESS
Met with pt, confirmed plan for return to [facility] on this date- received order for discharge. E- discharge communication with  [facility] with Ms. Morales , admissions dept. acknowledges acceptance today - entered transfer summary into e-discharge system; transport package with all RX on chart . Nurse Jake Perera , Ms.  Mundo Burns and pt informed all of transport time for 2:00 Pm

## 2017-06-22 NOTE — PROGRESS NOTES
Kayley Solorzano M.D. PROGRESS NOTE    Name: Reza Maguire MRN: [id number]   : 1950 Hospital: [address] [place] [address]   Date: 2017  Admission Date: 2017     Subjective/Objective/Plans  1. Psoas infection  2. Paraplegia  3. DM  4. HLD     Having some leg movements, cramps are less  Chronic left upper quadrant pain, gastroparesis, gastric pacemaker  BS fair     No complaint  VSS  Lab tests ok    Plan  DC [facility]    Vital Signs:  Visit Vitals    /70 (BP 1 Location: Left arm, BP Patient Position: At rest)    Pulse 72    Temp 99 °F (37.2 °C)    Resp 20    Ht 5' 7\" (1.702 m)    Wt 106.1 kg (234 lb)    SpO2 99%    Breastfeeding No    BMI 36.65 kg/m2       O2 Device: Room air       Temp (24hrs), Av.7 °F (37.1 °C), Min:98.1 °F (36.7 °C), Max:99.2 °F (37.3 °C)     10:54 AM  Intake/Output:   Last shift:       07 - 1900  In: -   Out: 300 [Urine:300]  Last 3 shifts:  190 -  0700  In: 2020 [P.O.:1920; I.V.:100]  Out: 3250 [Urine:3250]    Intake/Output Summary (Last 24 hours) at 17 1054  Last data filed at 17 0830   Gross per 24 hour   Intake              480 ml   Output             2125 ml   Net            -1645 ml         Physical Exam:    General: in no apparent distress    HEENT: pupils equal, no ear discharge   Neck: No abnormally enlarged lymph nodes. , no JDV   Mouth: MMM no lesions    Chest: normal, no breast masses   Lungs: normal air entry   Heart: Regular rate and rhythm   Abdomen: abdomen is soft without significant tenderness, masses, organomegaly or guarding   Extremity: paraplegia   Neuro: alert    Skin: Skin color, texture, turgor normal. No rashes or lesions    Data Review:    Labs: Results:       Chemistry No results for input(s): GLU, NA, K, CL, CO2, BUN, CREA, CA, AGAP, BUCR, TBILI, GPT, AP, TP, ALB, GLOB, AGRAT in the last 72 hours.    CBC w/Diff No results for input(s): WBC, RBC, HGB, HCT, PLT, GRANS, LYMPH, EOS, HGBEXT, HCTEXT, PLTEXT in the last 72 hours. Cardiac Enzymes No results for input(s): CPK, CKND1, CARLI in the last 72 hours. No lab exists for component: CKRMB, TROIP   Coagulation No results for input(s): PTP, INR, APTT in the last 72 hours. No lab exists for component: INREXT    Lipid Panel Lab Results   Component Value Date/Time    Cholesterol, total 154 07/24/2012 01:00 AM    HDL Cholesterol 48 07/24/2012 01:00 AM    LDL, calculated 90.4 07/24/2012 01:00 AM    VLDL, calculated 15.6 07/24/2012 01:00 AM    Triglyceride 78 07/24/2012 01:00 AM    CHOL/HDL Ratio 3.2 07/24/2012 01:00 AM      BNP No results for input(s): BNPP in the last 72 hours. Liver Enzymes No results for input(s): TP, ALB, TBILI, AP, SGOT, ALT, CBIL in the last 72 hours. Thyroid Studies Lab Results   Component Value Date/Time    TSH 0.51 04/20/2017 07:26 PM          Procedures/imaging: see electronic medical records for all procedures, Xrays and details which were not copied into this note but were reviewed. Allergies:   Allergies   Allergen Reactions    Vancomycin Itching    Ampicillin Itching    Bactrim [Sulfamethoxazole-Trimethoprim] Unknown (comments)    Blueberry Swelling     Causes throat swelling    Ciprofloxacin Itching    Codeine Other (comments)     Jumpy feeling    Crestor [Rosuvastatin] Itching    Darvocet A500 [Propoxyphene N-Acetaminophen] Itching    Demerol [Meperidine] Itching    Levaquin [Levofloxacin] Itching    Lipitor [Atorvastatin] Myalgia    Magnesium Oxide Itching     nausea    Minocin [Minocycline] Unknown (comments)    Pcn [Penicillins] Itching    Pravachol [Pravastatin] Swelling     Swelling in mouth     Shellfish Derived Unknown (comments)    Sulfa (Sulfonamide Antibiotics) Itching    Ultracet [Tramadol-Acetaminophen] Itching    Vicodin [Hydrocodone-Acetaminophen] Unknown (comments)    Vytorin 10-10 [Ezetimibe-Simvastatin] Myalgia    Percodan [Oxycodone Hcl-Oxycodone-Asa] Itching       Home Medications:  Prior to Admission Medications   Prescriptions Last Dose Informant Patient Reported? Taking? Lactobacillus Acidoph & Bulgar (FLORANEX) 1 million cell tab tablet   No No   Sig: Take 1 Tab by mouth two (2) times a day for 21 days. acetaminophen (TYLENOL) 325 mg tablet   No No   Sig: Take 2 Tabs by mouth every four (4) hours as needed (for fever or pain level less than 5/10). Indications: Fever, Pain   albuterol-ipratropium (DUO-NEB) 2.5 mg-0.5 mg/3 ml nebu   No No   Sig: 3 mL by Nebulization route four (4) times daily. allopurinol (ZYLOPRIM) 100 mg tablet   No No   Sig: Take 0.5 Tabs by mouth daily. Indications: HYPERURICEMIA   aluminum-magnesium hydroxide (MAALOX) 200-200 mg/5 mL suspension   No No   Sig: Take 15 mL by mouth four (4) times daily as needed for Indigestion. cefTRIAXone 2 gram 2 g IVPB   No No   Si g by IntraVENous route every twenty-four (24) hours for 20 days. cholecalciferol (VITAMIN D3) 1,000 unit tablet   No No   Sig: Take 2 Tabs by mouth daily. Indications: PREVENTION OF VITAMIN D DEFICIENCY   famotidine (PEPCID) 20 mg tablet   No No   Sig: Take 1 Tab by mouth nightly. ferrous sulfate 325 mg (65 mg iron) tablet   No No   Sig: Take 1 Tab by mouth two (2) times daily (with meals) for 14 days. folic acid (FOLVITE) 1 mg tablet   No No   Sig: Take 1 Tab by mouth daily. insulin aspart (NOVOLOG) 100 unit/mL injection   No No   Sig: INITIATE INSULIN CORRECTIVE PROTOCOL (HR): Normal Insulin Sensitivity  For Blood Sugar (mg/dL) of:    Less than 150 =   0 units          150 -199 =   2 units 200 -249 =   4 units 250 -299 =   6 units 300 -349 =   8 units 350 and above =   10 units If 2 glucose readings are above 200 mg/dL   insulin glargine (LANTUS) 100 unit/mL injection   No No   Si Units by SubCUTAneous route nightly.  Indications: type 2 diabetes mellitus   midodrine (PROAMITINE) 2.5 mg tablet   No No   Si mg [2 tablets] po three time a day for 5 days then 2.5 mg po three time a day x 5 days then stop   ondansetron (ZOFRAN ODT) 4 mg disintegrating tablet   Yes No   Sig: Take 4 mg by mouth every eight (8) hours as needed for Nausea. oxyCODONE-acetaminophen (PERCOCET 7.5) 7.5-325 mg per tablet   No No   Sig: Take 1 Tab by mouth every six (6) hours as needed (for pain level greater than 5/10). Max Daily Amount: 4 Tabs. Indications: Pain   polyethylene glycol (MIRALAX) 17 gram packet   No No   Sig: Take 1 Packet by mouth two (2) times a day. pravastatin (PRAVACHOL) 40 mg tablet   Yes No   Sig: Take 40 mg by mouth nightly. Indications: DYSLIPIDEMIA   senna-docusate (PERICOLACE) 8.6-50 mg per tablet   No No   Sig: Take 2 Tabs by mouth daily (after dinner).  Indications: Constipation      Facility-Administered Medications: None       Current Medications:  Current Facility-Administered Medications   Medication Dose Route Frequency    loperamide (IMODIUM) 1 mg/5 mL oral solution 2 mg  2 mg Oral QID PRN    gabapentin (NEURONTIN) capsule 600 mg  600 mg Oral TID    furosemide (LASIX) tablet 40 mg  40 mg Oral DAILY    diphenhydrAMINE (BENADRYL) capsule 25 mg  25 mg Oral Q6H PRN    sodium chloride (NS) flush 10 mL  10 mL InterCATHeter Q24H    sodium chloride (NS) flush 10 mL  10 mL InterCATHeter PRN    alteplase (CATHFLO) 1 mg in sterile water (preservative free) 1 mL injection  1 mg InterCATHeter PRN    potassium chloride (K-DUR, KLOR-CON) SR tablet 20 mEq  20 mEq Oral BID    ondansetron (ZOFRAN ODT) tablet 4 mg  4 mg Oral Q8H PRN    acetaminophen (TYLENOL) tablet 650 mg  650 mg Oral Q4H PRN    senna-docusate (PERICOLACE) 8.6-50 mg per tablet 2 Tab  2 Tab Oral PCD    cholecalciferol (VITAMIN D3) tablet 2,000 Units  2,000 Units Oral DAILY    oxyCODONE-acetaminophen (PERCOCET 7.5) 7.5-325 mg per tablet 1 Tab  1 Tab Oral Q6H PRN    allopurinol (ZYLOPRIM) tablet 50 mg  50 mg Oral DAILY    midodrine (PROAMITINE) tablet 2.5 mg  2.5 mg Oral TID WITH MEALS    Lactobacillus Acidoph & Bulgar [facility]) tablet 1 Tab  1 Tab Oral BID    folic acid (FOLVITE) tablet 1 mg  1 mg Oral DAILY    insulin glargine (LANTUS) injection 5 Units  5 Units SubCUTAneous QHS    ferrous sulfate tablet 325 mg  325 mg Oral BID WITH MEALS    famotidine (PEPCID) tablet 20 mg  20 mg Oral QHS    polyethylene glycol (MIRALAX) packet 17 g  17 g Oral BID    cefTRIAXone (ROCEPHIN) 2 g in 0.9% sodium chloride (MBP/ADV) 50 mL MBP  2 g IntraVENous Q24H    aluminum-magnesium hydroxide (MAALOX) oral suspension 15 mL  15 mL Oral QID PRN    sodium chloride (NS) flush 5-10 mL  5-10 mL IntraVENous Q8H    albuterol-ipratropium (DUO-NEB) 2.5 MG-0.5 MG/3 ML  3 mL Nebulization Q4H PRN    magnesium oxide (MAG-OX) tablet 400 mg  400 mg Oral BID    diphenhydrAMINE (BENADRYL) capsule 25 mg  25 mg Oral BID    insulin lispro (HUMALOG) injection   SubCUTAneous AC&HS    glucose chewable tablet 16 g  16 g Oral PRN    glucagon (GLUCAGEN) injection 1 mg  1 mg IntraMUSCular PRN    dextrose (D50W) injection syrg 12.5-25 g  25-50 mL IntraVENous PRN    0.9% sodium chloride infusion 250 mL  250 mL IntraVENous PRN       Chart and notes reviewed. Data reviewed. I have evaluated and examined the patient. 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 7XXA    Difficult airway for intubation T88. 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 1XXA    Impaired mobility and ADLs Z74.09    History of Coumadin therapy Z79.01    Gout M10.9    Acute paraplegia ([facility]) G82.20    Sepsis ([address] A41.9    Psoas abscess, right ([address] K68.12    Krueger catheter in place on admission Z96.0    Urinary tract infection due to Enterococcus N39.0, B95.2    Group B streptococcal infection A49.1    Hypervolemia E87.70    Anemia D64.9 ·         PLAN:/DISCUSSION:   · Henry Estrada MD  6/22/2017, 10:54 AM

## 2017-06-22 NOTE — DISCHARGE INSTRUCTIONS
Anemia: Care Instructions  Your Care Instructions    Anemia is a low level of red blood cells, which carry oxygen throughout your body. Many things can cause anemia. Lack of iron is one of the most common causes. Your body needs iron to make hemoglobin, a substance in red blood cells that carries oxygen from the lungs to your body's cells. Without enough iron, the body produces fewer and smaller red blood cells. As a result, your body's cells do not get enough oxygen, and you feel tired and weak. And you may have trouble concentrating. Bleeding is the most common cause of a lack of iron. You may have heavy menstrual bleeding or bleeding caused by conditions such as ulcers, hemorrhoids, or cancer. Regular use of aspirin or other anti-inflammatory medicines (such as ibuprofen) also can cause bleeding in some people. A lack of iron in your diet also can cause anemia, especially at times when the body needs more iron, such as during pregnancy, infancy, and the teen years. Your doctor may have prescribed iron pills. It may take several months of treatment for your iron levels to return to normal. Your doctor also may suggest that you eat foods that are rich in iron, such as meat and beans. There are many other causes of anemia. It is not always due to a lack of iron. Finding the specific cause of your anemia will help your doctor find the right treatment for you. Follow-up care is a key part of your treatment and safety. Be sure to make and go to all appointments, and call your doctor if you are having problems. It's also a good idea to know your test results and keep a list of the medicines you take. How can you care for yourself at home? · Take your medicines exactly as prescribed. Call your doctor if you think you are having a problem with your medicine. · If your doctor recommends iron pills, take them as directed:  ¨ Try to take the pills on an empty stomach about 1 hour before or 2 hours after meals. But you may need to take iron with food to avoid an upset stomach. ¨ Do not take antacids or drink milk or caffeine drinks (such as coffee, tea, or cola) at the same time or within 2 hours of the time that you take your iron. They can make it hard for your body to absorb the iron. ¨ Vitamin C (from food or supplements) helps your body absorb iron. Try taking iron pills with a glass of orange juice or some other food that is high in vitamin C, such as citrus fruits. ¨ Iron pills may cause stomach problems, such as heartburn, nausea, diarrhea, constipation, and cramps. Be sure to drink plenty of fluids, and include fruits, vegetables, and fiber in your diet each day. Iron pills often make your bowel movements dark or green. ¨ If you forget to take an iron pill, do not take a double dose of iron the next time you take a pill. ¨ Keep iron pills out of the reach of small children. An overdose of iron can be very dangerous. · Follow your doctor's advice about eating iron-rich foods. These include red meat, shellfish, poultry, eggs, beans, raisins, whole-grain bread, and leafy green vegetables. · Steam vegetables to help them keep their iron content. When should you call for help? Call 911 anytime you think you may need emergency care. For example, call if:  · You have symptoms of a heart attack. These may include:  ¨ Chest pain or pressure, or a strange feeling in the chest.  ¨ Sweating. ¨ Shortness of breath. ¨ Nausea or vomiting. ¨ Pain, pressure, or a strange feeling in the back, neck, jaw, or upper belly or in one or both shoulders or arms. ¨ Lightheadedness or sudden weakness. ¨ A fast or irregular heartbeat. After you call 911, the  may tell you to chew 1 adult-strength or 2 to 4 low-dose aspirin. Wait for an ambulance. Do not try to drive yourself. · You passed out (lost consciousness).   Call your doctor now or seek immediate medical care if:  · You have new or increased shortness of breath. · You are dizzy or lightheaded, or you feel like you may faint. · Your fatigue and weakness continue or get worse. · You have any abnormal bleeding, such as:  ¨ Nosebleeds. ¨ Vaginal bleeding that is different (heavier, more frequent, at a different time of the month) than what you are used to. ¨ Bloody or black stools, or rectal bleeding. ¨ Bloody or pink urine. Watch closely for changes in your health, and be sure to contact your doctor if:  · You do not get better as expected. Where can you learn more? Go to http://aren-mirella.info/. Enter R301 in the search box to learn more about \"Anemia: Care Instructions. \"  Current as of: October 13, 2016  Content Version: 11.3  © 8111-8763 [employer]. Care instructions adapted under license by [employer] (which disclaims liability or warranty for this information). If you have questions about a medical condition or this instruction, always ask your healthcare professional. [place] [address] any warranty or liability for your use of this information. Anemia: Care Instructions  Your Care Instructions    Anemia is a low level of red blood cells, which carry oxygen throughout your body. Many things can cause anemia. Lack of iron is one of the most common causes. Your body needs iron to make hemoglobin, a substance in red blood cells that carries oxygen from the lungs to your body's cells. Without enough iron, the body produces fewer and smaller red blood cells. As a result, your body's cells do not get enough oxygen, and you feel tired and weak. And you may have trouble concentrating. Bleeding is the most common cause of a lack of iron. You may have heavy menstrual bleeding or bleeding caused by conditions such as ulcers, hemorrhoids, or cancer. Regular use of aspirin or other anti-inflammatory medicines (such as ibuprofen) also can cause bleeding in some people.  A lack of iron in your diet also can cause anemia, especially at times when the body needs more iron, such as during pregnancy, infancy, and the teen years. Your doctor may have prescribed iron pills. It may take several months of treatment for your iron levels to return to normal. Your doctor also may suggest that you eat foods that are rich in iron, such as meat and beans. There are many other causes of anemia. It is not always due to a lack of iron. Finding the specific cause of your anemia will help your doctor find the right treatment for you. Follow-up care is a key part of your treatment and safety. Be sure to make and go to all appointments, and call your doctor if you are having problems. It's also a good idea to know your test results and keep a list of the medicines you take. How can you care for yourself at home? · Take your medicines exactly as prescribed. Call your doctor if you think you are having a problem with your medicine. · If your doctor recommends iron pills, take them as directed:  ¨ Try to take the pills on an empty stomach about 1 hour before or 2 hours after meals. But you may need to take iron with food to avoid an upset stomach. ¨ Do not take antacids or drink milk or caffeine drinks (such as coffee, tea, or cola) at the same time or within 2 hours of the time that you take your iron. They can make it hard for your body to absorb the iron. ¨ Vitamin C (from food or supplements) helps your body absorb iron. Try taking iron pills with a glass of orange juice or some other food that is high in vitamin C, such as citrus fruits. ¨ Iron pills may cause stomach problems, such as heartburn, nausea, diarrhea, constipation, and cramps. Be sure to drink plenty of fluids, and include fruits, vegetables, and fiber in your diet each day. Iron pills often make your bowel movements dark or green. ¨ If you forget to take an iron pill, do not take a double dose of iron the next time you take a pill.   ¨ Keep iron pills out of the reach of small children. An overdose of iron can be very dangerous. · Follow your doctor's advice about eating iron-rich foods. These include red meat, shellfish, poultry, eggs, beans, raisins, whole-grain bread, and leafy green vegetables. · Steam vegetables to help them keep their iron content. When should you call for help? Call 911 anytime you think you may need emergency care. For example, call if:  · You have symptoms of a heart attack. These may include:  ¨ Chest pain or pressure, or a strange feeling in the chest.  ¨ Sweating. ¨ Shortness of breath. ¨ Nausea or vomiting. ¨ Pain, pressure, or a strange feeling in the back, neck, jaw, or upper belly or in one or both shoulders or arms. ¨ Lightheadedness or sudden weakness. ¨ A fast or irregular heartbeat. After you call 911, the  may tell you to chew 1 adult-strength or 2 to 4 low-dose aspirin. Wait for an ambulance. Do not try to drive yourself. · You passed out (lost consciousness). Call your doctor now or seek immediate medical care if:  · You have new or increased shortness of breath. · You are dizzy or lightheaded, or you feel like you may faint. · Your fatigue and weakness continue or get worse. · You have any abnormal bleeding, such as:  ¨ Nosebleeds. ¨ Vaginal bleeding that is different (heavier, more frequent, at a different time of the month) than what you are used to. ¨ Bloody or black stools, or rectal bleeding. ¨ Bloody or pink urine. Watch closely for changes in your health, and be sure to contact your doctor if:  · You do not get better as expected. Where can you learn more? Go to http://aren-mirella.info/. Enter R301 in the search box to learn more about \"Anemia: Care Instructions. \"  Current as of: October 13, 2016  Content Version: 11.3  © 1635-0856 [employer].  Care instructions adapted under license by [employer] (which disclaims liability or warranty for this information). If you have questions about a medical condition or this instruction, always ask your healthcare professional. [address] any warranty or liability for your use of this information. Patient armband removed and shredded  MyChart Activation    Thank you for requesting access to Trustribe. Please follow the instructions below to securely access and download your online medical record. Trustribe allows you to send messages to your doctor, view your test results, renew your prescriptions, schedule appointments, and more. How Do I Sign Up? 1. In your internet browser, go to [email or web address]  2. Click on the First Time User? Click Here link in the Sign In box. You will be redirect to the New Member Sign Up page. 3. Enter your Trustribe Access Code exactly as it appears below. You will not need to use this code after youve completed the sign-up process. If you do not sign up before the expiration date, you must request a new code. Trustribe Access Code: [email or web address]  Expires: 2017  4:28 PM (This is the date your Trustribe access code will )    4. Enter the last four digits of your Social Security Number (xxxx) and Date of Birth (mm/dd/yyyy) as indicated and click Submit. You will be taken to the next sign-up page. 5. Create a Trustribe ID. This will be your Trustribe login ID and cannot be changed, so think of one that is secure and easy to remember. 6. Create a Trustribe password. You can change your password at any time. 7. Enter your Password Reset Question and Answer. This can be used at a later time if you forget your password. 8. Enter your e-mail address. You will receive e-mail notification when new information is available in [address] 9. Click Sign Up. You can now view and download portions of your medical record. 10. Click the Download Summary menu link to download a portable copy of your medical information.     Additional Information    If you have questions, please visit the Frequently Asked Questions section of the [employer] website at [email or web address] Remember, MyChart is NOT to be used for urgent needs. For medical emergencies, dial 911. DISCHARGE SUMMARY from Nurse    The following personal items are in your possession at time of discharge:    Dental Appliances: None  Visual Aid: None     Home Medications: None  Jewelry: None  Clothing: None  Other Valuables: None  Personal Items Sent to Safe: none          PATIENT INSTRUCTIONS:    After general anesthesia or intravenous sedation, for 24 hours or while taking prescription Narcotics:  · Limit your activities  · Do not drive and operate hazardous machinery  · Do not make important personal or business decisions  · Do  not drink alcoholic beverages  · If you have not urinated within 8 hours after discharge, please contact your surgeon on call. Report the following to your surgeon:  · Excessive pain, swelling, redness or odor of or around the surgical area  · Temperature over 100.5  · Nausea and vomiting lasting longer than 4 hours or if unable to take medications  · Any signs of decreased circulation or nerve impairment to extremity: change in color, persistent  numbness, tingling, coldness or increase pain  · Any questions        What to do at Home:  Recommended activity: Activity as tolerated    If you experience any of the following symptoms weak, lightheaded, pain, chest pains, short of breath please follow up with PCP. *  Please give a list of your current medications to your Primary Care Provider. *  Please update this list whenever your medications are discontinued, doses are      changed, or new medications (including over-the-counter products) are added. *  Please carry medication information at all times in case of emergency situations.           These are general instructions for a healthy lifestyle:    No smoking/ No tobacco products/ Avoid exposure to second hand smoke    Surgeon General's Warning:  Quitting smoking now greatly reduces serious risk to your health. Obesity, smoking, and sedentary lifestyle greatly increases your risk for illness    A healthy diet, regular physical exercise & weight monitoring are important for maintaining a healthy lifestyle    You may be retaining fluid if you have a history of heart failure or if you experience any of the following symptoms:  Weight gain of 3 pounds or more overnight or 5 pounds in a week, increased swelling in our hands or feet or shortness of breath while lying flat in bed. Please call your doctor as soon as you notice any of these symptoms; do not wait until your next office visit. Recognize signs and symptoms of STROKE:    F-face looks uneven    A-arms unable to move or move unevenly    S-speech slurred or non-existent    T-time-call 911 as soon as signs and symptoms begin-DO NOT go       Back to bed or wait to see if you get better-TIME IS BRAIN. Warning Signs of HEART ATTACK     Call 911 if you have these symptoms:   Chest discomfort. Most heart attacks involve discomfort in the center of the chest that lasts more than a few minutes, or that goes away and comes back. It can feel like uncomfortable pressure, squeezing, fullness, or pain.  Discomfort in other areas of the upper body. Symptoms can include pain or discomfort in one or both arms, the back, neck, jaw, or stomach.  Shortness of breath with or without chest discomfort.  Other signs may include breaking out in a cold sweat, nausea, or lightheadedness. Don't wait more than five minutes to call 911 - MINUTES MATTER! Fast action can save your life. Calling 911 is almost always the fastest way to get lifesaving treatment. Emergency Medical Services staff can begin treatment when they arrive -- up to an hour sooner than if someone gets to the hospital by car. The discharge information has been reviewed with the patient.   The patient verbalized understanding. Discharge medications reviewed with the patient and appropriate educational materials and side effects teaching were provided.

## 2017-06-22 NOTE — PROGRESS NOTES
conducted a Follow up consultation and Spiritual Assessment for Hanna Arthur, who is a 77 y.o.,female. The  provided the following Interventions:  Continued the relationship of care and support. Patient discussed her long recovery and discussed her supportive Taoism and family community and how God has gotten her through it all. Affirmed braulio and patience as coping skills for difficult recovery. Listened empathically. Offered prayer and assurance of continued prayer on patients behalf. Read Scripture that patient requested. Psalm 34. Chart reviewed. The following outcomes were achieved:  Patient expressed gratitude for 's visit. Assessment:  There are no further spiritual or Yarsanism issues which require Spiritual Care Services interventions at this time. Plan:  Chaplains will continue to follow and will provide pastoral care on an as needed/requested basis.  recommends bedside caregivers page  on duty if patient shows signs of acute spiritual or emotional distress.        [address] [facility]  Spiritual Care   [phone]

## 2017-06-22 NOTE — PROGRESS NOTES
The patient is being discharged SNF. The patient has been given discharge instructions. The patient education was given. The patient medication list was reviewed and education was given. The patient line was removed. The patient will be transported by EMS. Pt allowed time to ask questions or voice concerns.

## 2017-06-22 NOTE — PROGRESS NOTES
Discharge medications reviewed with patient and appropriate educational materials and side effects teaching were provided. Patient agrees with plan of care, Verbal report given to  RN at [facility] on [address] [facility]  being transferred to [facility]. Report consisted of patients Situation, Background, Assessment and   Recommendations(SBAR). Lines:   PICC Single Lumen 61/39/71 Right;Basilic (Active)   Central Line Being Utilized Yes 6/22/2017 12:06 PM   Criteria for Appropriate Use Long term IV/antibiotic administration 6/22/2017 12:06 PM   Site Assessment Clean 6/22/2017 12:06 PM   Phlebitis Assessment 0 6/22/2017 12:06 PM   Infiltration Assessment 0 6/22/2017 12:06 PM   Arm Circumference (cm) 34 cm 6/13/2017  8:00 AM   Date of Last Dressing Change 06/21/17 6/22/2017 12:06 PM   Dressing Status Clean, dry, & intact 6/22/2017 12:06 PM   External Catheter Length (cm) 0 centimeters 6/13/2017  8:00 AM   Dressing Type Disk with Chlorhexadine gluconate (CHG) 6/22/2017 12:06 PM   Hub Color/Line Status Red 6/22/2017 12:06 PM   Action Taken Dressing reinforced 6/20/2017  3:00 AM   Positive Blood Return (Site #1) Yes 6/22/2017 12:06 PM   Alcohol Cap Used No 6/22/2017 12:06 PM        Opportunity for questions and clarification was provided.

## 2017-06-22 NOTE — ROUTINE PROCESS
Bedside shift change report given to Beverly Avila RN (oncoming nurse) by DANIEL Guerra (offgoing nurse). Report included the following information SBAR, Kardex, Procedure Summary, Intake/Output, MAR and Recent Results.

## 2017-06-23 NOTE — PROGRESS NOTES
Received a call from nurse, Alen Hutchison, at [facility] , asking for clarification of order for Magnesium Oxide as pt has listed drug allergy ( itching) documented . Reviewed with Dr. Carlon Skiff who stated pt had been tolerating the drug since it was initiated 06-16-16 . The medication was needed to replenish a low serum level and the pt. should continue taking the medication until a repeat serum level is  WNL . He stated  benadryl was ordered as a standing medication x 10 days as well as prn  at the same time the MgO2 was ordered. I spoke to Nurse Milo Oh and informed her of this information. She also had reviewed wit her facility's on call MD & received order to pre-medicate with benadryl 30 minutes before administering the medication.

## 2017-08-15 ENCOUNTER — OFFICE VISIT (OUTPATIENT)
Dept: NEUROLOGY | Age: 67
End: 2017-08-15

## 2017-08-15 VITALS
HEART RATE: 82 BPM | OXYGEN SATURATION: 98 % | RESPIRATION RATE: 12 BRPM | BODY MASS INDEX: 32.74 KG/M2 | DIASTOLIC BLOOD PRESSURE: 62 MMHG | SYSTOLIC BLOOD PRESSURE: 120 MMHG | WEIGHT: 216 LBS | HEIGHT: 68 IN | TEMPERATURE: 98.1 F

## 2017-08-15 DIAGNOSIS — G82.20 ACUTE PARAPLEGIA (HCC): Primary | ICD-10-CM

## 2017-08-15 NOTE — COMMUNICATION BODY
Re:  Timothy Gonzalez,Follow up visit     8/15/2017 3:12 PM    SSN: [id number]    Subjective:   Cuate Shaikh is seen in follow up for her paraparesis. 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 This was back in March. Since that time she's spent some time in rehabilitation. Her only major complaint is a burning pain when she's turned onto her right hip. She's currently living at home. She takes tylenol for the pain which helps. Medications:    Current Outpatient Prescriptions   Medication Sig Dispense Refill    SITagliptin (JANUVIA) 50 mg tablet Take 50 mg by mouth daily.  loperamide (IMODIUM) 1 mg/5 mL solution Take 10 mL by mouth four (4) times daily as needed for Diarrhea. 60 mL 0    ferrous sulfate 325 mg (65 mg iron) tablet Take 1 Tab by mouth two (2) times daily (with meals). 100 Tab 0    Lactobacillus Acidoph & Bulgar (FLORANEX) 1 million cell tab tablet Take 1 Tab by mouth two (2) times a day. 60 Tab 0    oxyCODONE-acetaminophen (PERCOCET 7.5) 7.5-325 mg per tablet Take 1 Tab by mouth every six (6) hours as needed (for pain level greater than 5/10). Max Daily Amount: 4 Tabs. Indications: Pain 60 Tab 0    furosemide (LASIX) 40 mg tablet 1 tab daily  Indications: Edema 60 Tab 0    gabapentin (NEURONTIN) 300 mg capsule Take 2 Caps by mouth two (2) times a day. Indications: NEUROPATHIC PAIN 100 Cap 0    insulin lispro (HUMALOG) 100 unit/mL injection See sliding scale 1 Vial 0    magnesium oxide (MAG-OX) 400 mg tablet Take 1 Tab by mouth two (2) times a day. 60 Tab 0    potassium chloride (K-DUR, KLOR-CON) 20 mEq tablet Take 1 Tab by mouth two (2) times a day. 30 Tab 0    albuterol-ipratropium (DUO-NEB) 2.5 mg-0.5 mg/3 ml nebu 3 mL by Nebulization route four (4) times daily. 30 Nebule 0    allopurinol (ZYLOPRIM) 100 mg tablet Take 0.5 Tabs by mouth daily.  Indications: HYPERURICEMIA 30 Tab 0    midodrine (PROAMITINE) 2.5 mg tablet 5 mg [2 tablets] po three time a day for 5 days then 2.5 mg po three time a day x 5 days then stop 45 Tab 0    aluminum-magnesium hydroxide (MAALOX) 200-200 mg/5 mL suspension Take 15 mL by mouth four (4) times daily as needed for Indigestion. 488 mL 0    folic acid (FOLVITE) 1 mg tablet Take 1 Tab by mouth daily. 30 Tab 0    insulin glargine (LANTUS) 100 unit/mL injection 5 Units by SubCUTAneous route nightly. Indications: type 2 diabetes mellitus (Patient taking differently: 10 Units by SubCUTAneous route nightly. Indications: type 2 diabetes mellitus) 1 Vial 0    famotidine (PEPCID) 20 mg tablet Take 1 Tab by mouth nightly. 30 Tab 0    polyethylene glycol (MIRALAX) 17 gram packet Take 1 Packet by mouth two (2) times a day. 30 Packet 0    cholecalciferol (VITAMIN D3) 1,000 unit tablet Take 2 Tabs by mouth daily. Indications: PREVENTION OF VITAMIN D DEFICIENCY 30 Tab 0    ondansetron (ZOFRAN ODT) 4 mg disintegrating tablet Take 4 mg by mouth every eight (8) hours as needed for Nausea.  acetaminophen (TYLENOL) 325 mg tablet Take 2 Tabs by mouth every four (4) hours as needed (for fever or pain level less than 5/10). Indications: Fever, Pain 30 Tab 0    senna-docusate (PERICOLACE) 8.6-50 mg per tablet Take 2 Tabs by mouth daily (after dinner). Indications: Constipation 30 Tab 0    pravastatin (PRAVACHOL) 40 mg tablet Take 40 mg by mouth nightly.  Indications: DYSLIPIDEMIA      insulin aspart (NOVOLOG) 100 unit/mL injection INITIATE INSULIN CORRECTIVE PROTOCOL (HR): Normal Insulin Sensitivity  For Blood Sugar (mg/dL) of:    Less than 150 =   0 units          150 -199 =   2 units 200 -249 =   4 units 250 -299 =   6 units 300 -349 =   8 units 350 and above =   10 units If 2 glucose readings are above 200 mg/dL 10 mL 6       Vital signs:    Visit Vitals    /62 (BP 1 Location: Left arm, BP Patient Position: Sitting)    Pulse 82    Temp 98.1 °F (36.7 °C) (Oral)    Resp 12    Ht 5' 7.5\" (1.715 m)    Wt 98 kg (216 lb)  Comment: w/c- weight per patient    SpO2 98%    BMI 33.33 kg/m2       Review of Systems:   As above otherwise 11 point review of systems negative including;   Constitutional no fever or chills  Skin denies rash or itching  HEENT  Denies tinnitus, hearing lose  Eyes denies diplopia vision lose  Respiratory denies sortness of breath  Cardiovascular denies chest pain, dyspnea on exertion  Gastrointestinal denies nausea, vomiting, diarrhea, constipation  Genitourinary denies incontinence  Musculoskeletal denies joint pain or swelling  Endocrine denies weight change  Hematology denies easy bruising or bleeding   Neurological as above in HPI      Patient Active Problem List   Diagnosis Code    Nonischemic cardiomyopathy ([facility] I42.8    History of complete heart block Z86.79    Biventricular implantable cardioverter-defibrillator in situ Z95.810    Left bundle branch block (LBBB) on electrocardiogram I44.7    Type 2 diabetes mellitus with diabetic neuropathy ([facility]) E11.40    Dyslipidemia E78.5    Diabetic neuropathy associated with type 2 diabetes mellitus ([facility] E11.40    Obstructive sleep apnea on CPAP G47.33, Z99.89    AICD generator infection ([facility] T82. 7XXA    Difficult airway for intubation T88. 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 1XXA    Impaired mobility and ADLs Z74.09    History of Coumadin therapy Z79.01    Gout M10.9    Acute paraplegia (HCC) G82.20    Sepsis ([facility] A41.9    Psoas abscess, right ([address] K68.12    Krueger catheter in place on admission Z96.0    Urinary tract infection due to Enterococcus N39.0, B95.2    Group B streptococcal infection A49.1    Hypervolemia E87.70    Anemia D64.9         Objective: The patient is awake, alert, and oriented x 4. Fund of knowledge is adequate. Speech is fluent and memory is intact. Cranial Nerves: II  Visual fields are full to confrontation. III, IV, VI  Extraocular movements are intact. There is no nystagmus. V  Facial sensation is intact to pinprick. VII  Face is symmetrical.  VIII - Hearing is present. IX, X, XII  Palate is symmetrical.   XI - Shoulder shrugging and head turning intact  Motor: The patient moves upper limbs fairly well and symmetrically. She has about 2/5 strength in the left and 1/5 strength in the right leg diffusely, stronger proximally than distally. Tone is normal. Reflexes are trace+ and symmetrical. Plantars are down going. Gait is not testable, she's wheel chair bound for now.     CBC:   Lab Results   Component Value Date/Time    WBC 6.2 06/19/2017 05:00 AM    RBC 3.29 06/19/2017 05:00 AM    HGB 8.8 06/19/2017 05:00 AM    HCT 26.9 06/19/2017 05:00 AM    PLATELET 554 37/00/7874 05:00 AM     BMP:   Lab Results   Component Value Date/Time    Glucose 153 06/19/2017 05:00 AM    Sodium 138 06/19/2017 05:00 AM    Potassium 4.2 06/19/2017 05:00 AM    Chloride 103 06/19/2017 05:00 AM    CO2 28 06/19/2017 05:00 AM    BUN 23 06/19/2017 05:00 AM    Creatinine 1.09 06/19/2017 05:00 AM    Calcium 9.6 06/19/2017 05:00 AM     CMP:   Lab Results   Component Value Date/Time    Glucose 153 06/19/2017 05:00 AM    Sodium 138 06/19/2017 05:00 AM    Potassium 4.2 06/19/2017 05:00 AM    Chloride 103 06/19/2017 05:00 AM    CO2 28 06/19/2017 05:00 AM    BUN 23 06/19/2017 05:00 AM    Creatinine 1.09 06/19/2017 05:00 AM    Calcium 9.6 06/19/2017 05:00 AM    Anion gap 7 06/19/2017 05:00 AM    BUN/Creatinine ratio 21 06/19/2017 05:00 AM    Alk. phosphatase 199 06/07/2017 05:51 AM    Protein, total 6.1 06/07/2017 05:51 AM    Albumin 1.9 06/07/2017 05:51 AM    Globulin 4.2 06/07/2017 05:51 AM    A-G Ratio 0.5 06/07/2017 05:51 AM     Coagulation:   Lab Results   Component Value Date/Time    Prothrombin time 15.1 06/06/2017 07:40 PM    INR 1.2 06/06/2017 07:40 PM    aPTT 42.6 06/06/2017 07:40 PM     Cardiac markers:   Lab Results   Component Value Date/Time    CK 33 06/06/2017 07:40 PM    CK-MB Index 3.3 06/06/2017 07:40 PM       Assessment:  Spinal cord infarction in this patient who has risk factors including psoas abscess. She seems to be doing well considering the type of injury that she had. Plan:  From my standpoint not a lot to add at this time. Continue rehab efforts. Will see back here in 6 months. Sincerely,        Perry Hoyos.  David Diamond M.D.

## 2017-08-15 NOTE — MR AVS SNAPSHOT
Visit Information Date & Time Provider Department Dept. Phone Encounter #  
 8/15/2017  2:40 PM Mari Bo MD [address] at [address] [facility] [id number] Your Appointments 2/13/2018  9:40 AM  
Follow Up with Mari Bo MD  
[address] at [address] [facility] [address] [facility]) Appt Note: 6 month fu  
 [address] [address] [address] [facility] 630 [facility] [address] 200 [facility] Upcoming Health Maintenance Date Due Hepatitis C Screening 1950 FOOT EXAM Q1 7/27/1960 MICROALBUMIN Q1 7/27/1960 EYE EXAM RETINAL OR DILATED Q1 7/27/1960 DTaP/Tdap/Td series (1 - Tdap) 7/27/1971 ZOSTER VACCINE AGE 60> 5/27/2010 BREAST CANCER SCRN MAMMOGRAM 1/19/2014 GLAUCOMA SCREENING Q2Y 7/27/2015 OSTEOPOROSIS SCREENING (DEXA) 7/27/2015 Pneumococcal 65+ Low/Medium Risk (1 of 2 - PCV13) 7/27/2015 MEDICARE YEARLY EXAM 7/27/2015 LIPID PANEL Q1 11/6/2015 INFLUENZA AGE 9 TO ADULT 8/1/2017 HEMOGLOBIN A1C Q6M 9/30/2017 FOBT Q 1 YEAR AGE 50-75 6/15/2018 Allergies as of 8/15/2017  Review Complete On: 8/15/2017 By: Mart Hinkle LPN Severity Noted Reaction Type Reactions Vancomycin High 08/17/2012   Side Effect Itching Ampicillin  06/11/2010    Itching Bactrim [Sulfamethoxazole-trimethoprim]  06/11/2010    Unknown (comments) Blueberry  03/31/2017    Swelling Causes throat swelling Ciprofloxacin  06/11/2010    Itching Codeine  06/11/2010    Other (comments) Jumpy feeling Crestor [Rosuvastatin]  06/11/2010    Itching Darvocet A500 [Propoxyphene N-acetaminophen]  06/11/2010    Itching Demerol [Meperidine]  06/11/2010    Itching Levaquin [Levofloxacin]  05/20/2014    Itching Lipitor [Atorvastatin]  06/11/2010    Myalgia Magnesium Oxide  06/28/2013    Itching  
 nausea Minocin [Minocycline]  06/11/2010    Unknown (comments) Pcn [Penicillins]  06/11/2010    Itching Pravachol [Pravastatin]  06/11/2010    Swelling Swelling in mouth Shellfish Derived  06/07/2017    Unknown (comments) Sulfa (Sulfonamide Antibiotics)  06/11/2010    Itching Ultracet [Tramadol-acetaminophen]  06/11/2010    Itching Vicodin [Hydrocodone-acetaminophen]  06/11/2010    Unknown (comments) Vytorin 10-10 [Ezetimibe-simvastatin]  06/11/2010    Myalgia Percodan [Oxycodone Hcl-oxycodone-asa] Low 06/11/2010   Side Effect Itching Current Immunizations  Reviewed on 5/2/2017 No immunizations on file. Not reviewed this visit You Were Diagnosed With   
  
 Codes Comments Acute paraplegia (HCC)    -  Primary ICD-10-CM: F53.01 ICD-9-CM: 344. 1 Vitals BP Pulse Temp Resp Height(growth percentile) Weight(growth percentile) 120/62 (BP 1 Location: Left arm, BP Patient Position: Sitting) 82 98.1 °F (36.7 °C) (Oral) 12 5' 7.5\" (1.715 m) 216 lb (98 kg) SpO2 BMI OB Status Smoking Status 98% 33.33 kg/m2 Hysterectomy Never Smoker BMI and BSA Data Body Mass Index Body Surface Area  
 33.33 kg/m 2 2.16 m 2 Preferred Pharmacy Pharmacy Name Phone [address], [facility] [phone] Your Updated Medication List  
  
   
This list is accurate as of: 8/15/17  3:30 PM.  Always use your most recent med list.  
  
  
  
  
 acetaminophen 325 mg tablet Commonly known as:  TYLENOL Take 2 Tabs by mouth every four (4) hours as needed (for fever or pain level less than 5/10). Indications: Fever, Pain  
  
 albuterol-ipratropium 2.5 mg-0.5 mg/3 ml Nebu Commonly known as:  DUO-NEB  
3 mL by Nebulization route four (4) times daily. allopurinol 100 mg tablet Commonly known as:  Zach Broussard Take 0.5 Tabs by mouth daily. Indications: HYPERURICEMIA aluminum-magnesium hydroxide 200-200 mg/5 mL suspension Commonly known as:  MAALOX Take 15 mL by mouth four (4) times daily as needed for Indigestion. cholecalciferol 1,000 unit tablet Commonly known as:  VITAMIN D3 Take 2 Tabs by mouth daily. Indications: PREVENTION OF VITAMIN D DEFICIENCY  
  
 famotidine 20 mg tablet Commonly known as:  PEPCID Take 1 Tab by mouth nightly. ferrous sulfate 325 mg (65 mg iron) tablet Take 1 Tab by mouth two (2) times daily (with meals). folic acid 1 mg tablet Commonly known as:  Google Take 1 Tab by mouth daily. furosemide 40 mg tablet Commonly known as:  LASIX  
1 tab daily  Indications: Edema  
  
 gabapentin 300 mg capsule Commonly known as:  NEURONTIN Take 2 Caps by mouth two (2) times a day. Indications: NEUROPATHIC PAIN  
  
 insulin aspart 100 unit/mL injection Commonly known as:  Keely Moser INITIATE INSULIN CORRECTIVE PROTOCOL (HR): Normal Insulin Sensitivity  For Blood Sugar (mg/dL) of:    Less than 150 =   0 units          150 -199 =   2 units 200 -249 =   4 units 250 -299 =   6 units 300 -349 =   8 units 350 and above =   10 units If 2 glucose readings are above 200 mg/dL  
  
 insulin glargine 100 unit/mL injection Commonly known as:  LANTUS  
5 Units by SubCUTAneous route nightly. Indications: type 2 diabetes mellitus  
  
 insulin lispro 100 unit/mL injection Commonly known as:  HUMALOG See sliding scale JANUVIA 50 mg tablet Generic drug:  SITagliptin Take 50 mg by mouth daily. Lactobacillus Acidoph & Bulgar 1 million cell Tab tablet Commonly known as:  Lori Diya Take 1 Tab by mouth two (2) times a day. loperamide 1 mg/5 mL solution Commonly known as:  IMODIUM Take 10 mL by mouth four (4) times daily as needed for Diarrhea.  
  
 magnesium oxide 400 mg tablet Commonly known as:  MAG-OX Take 1 Tab by mouth two (2) times a day. midodrine 2.5 mg tablet Commonly known as:  PROAMITINE  
5 mg [2 tablets] po three time a day for 5 days then 2.5 mg po three time a day x 5 days then stop  
  
 oxyCODONE-acetaminophen 7.5-325 mg per tablet Commonly known as:  PERCOCET 7.5 Take 1 Tab by mouth every six (6) hours as needed (for pain level greater than 5/10). Max Daily Amount: 4 Tabs. Indications: Pain  
  
 polyethylene glycol 17 gram packet Commonly known as:  Marlane Blinks Take 1 Packet by mouth two (2) times a day. potassium chloride 20 mEq tablet Commonly known as:  K-DUR, KLOR-CON Take 1 Tab by mouth two (2) times a day. pravastatin 40 mg tablet Commonly known as:  PRAVACHOL Take 40 mg by mouth nightly. Indications: DYSLIPIDEMIA  
  
 senna-docusate 8.6-50 mg per tablet Commonly known as:  Ellen Fresh Take 2 Tabs by mouth daily (after dinner). Indications: Constipation ZOFRAN ODT 4 mg disintegrating tablet Generic drug:  ondansetron Take 4 mg by mouth every eight (8) hours as needed for Nausea. Introducing [address]! Yeison Negron introduces [employer] patient portal. Now you can access parts of your medical record, email your doctor's office, and request medication refills online. 1. In your internet browser, go to [email or web address] 2. Click on the First Time User? Click Here link in the Sign In box. You will see the New Member Sign Up page. 3. Enter your [employer] Access Code exactly as it appears below. You will not need to use this code after youve completed the sign-up process. If you do not sign up before the expiration date, you must request a new code. · [employer] Access Code: [email or web address] Expires: 8/23/2017  4:28 PM 
 
4. Enter the last four digits of your Social Security Number (xxxx) and Date of Birth (mm/dd/yyyy) as indicated and click Submit. You will be taken to the next sign-up page. 5. Create a [employer] ID.  This will be your [employer] login ID and cannot be changed, so think of one that is secure and easy to remember. 6. Create a [employer] password. You can change your password at any time. 7. Enter your Password Reset Question and Answer. This can be used at a later time if you forget your password. 8. Enter your e-mail address. You will receive e-mail notification when new information is available in [address] 9. Click Sign Up. You can now view and download portions of your medical record. 10. Click the Download Summary menu link to download a portable copy of your medical information. If you have questions, please visit the Frequently Asked Questions section of the [employer] website. Remember, [employer] is NOT to be used for urgent needs. For medical emergencies, dial 911. Now available from your iPhone and Android! Please provide this summary of care documentation to your next provider. Your primary care clinician is listed as Shanice Garcia. If you have any questions after today's visit, please call [phone].

## 2017-08-15 NOTE — PROGRESS NOTES
Re:  Inés Gonzalez,Follow up visit     8/15/2017 3:12 PM    SSN: [id number]    Subjective:   Katlyn Kendrick is seen in follow up for her paraparesis. 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 This was back in March. Since that time she's spent some time in rehabilitation. Her only major complaint is a burning pain when she's turned onto her right hip. She's currently living at home. She takes tylenol for the pain which helps. Medications:    Current Outpatient Prescriptions   Medication Sig Dispense Refill    SITagliptin (JANUVIA) 50 mg tablet Take 50 mg by mouth daily.  loperamide (IMODIUM) 1 mg/5 mL solution Take 10 mL by mouth four (4) times daily as needed for Diarrhea. 60 mL 0    ferrous sulfate 325 mg (65 mg iron) tablet Take 1 Tab by mouth two (2) times daily (with meals). 100 Tab 0    Lactobacillus Acidoph & Bulgar (FLORANEX) 1 million cell tab tablet Take 1 Tab by mouth two (2) times a day. 60 Tab 0    oxyCODONE-acetaminophen (PERCOCET 7.5) 7.5-325 mg per tablet Take 1 Tab by mouth every six (6) hours as needed (for pain level greater than 5/10). Max Daily Amount: 4 Tabs. Indications: Pain 60 Tab 0    furosemide (LASIX) 40 mg tablet 1 tab daily  Indications: Edema 60 Tab 0    gabapentin (NEURONTIN) 300 mg capsule Take 2 Caps by mouth two (2) times a day. Indications: NEUROPATHIC PAIN 100 Cap 0    insulin lispro (HUMALOG) 100 unit/mL injection See sliding scale 1 Vial 0    magnesium oxide (MAG-OX) 400 mg tablet Take 1 Tab by mouth two (2) times a day. 60 Tab 0    potassium chloride (K-DUR, KLOR-CON) 20 mEq tablet Take 1 Tab by mouth two (2) times a day. 30 Tab 0    albuterol-ipratropium (DUO-NEB) 2.5 mg-0.5 mg/3 ml nebu 3 mL by Nebulization route four (4) times daily. 30 Nebule 0    allopurinol (ZYLOPRIM) 100 mg tablet Take 0.5 Tabs by mouth daily.  Indications: HYPERURICEMIA 30 Tab 0    midodrine (PROAMITINE) 2.5 mg tablet 5 mg [2 tablets] po three time a day for 5 days then 2.5 mg po three time a day x 5 days then stop 45 Tab 0    aluminum-magnesium hydroxide (MAALOX) 200-200 mg/5 mL suspension Take 15 mL by mouth four (4) times daily as needed for Indigestion. 647 mL 0    folic acid (FOLVITE) 1 mg tablet Take 1 Tab by mouth daily. 30 Tab 0    insulin glargine (LANTUS) 100 unit/mL injection 5 Units by SubCUTAneous route nightly. Indications: type 2 diabetes mellitus (Patient taking differently: 10 Units by SubCUTAneous route nightly. Indications: type 2 diabetes mellitus) 1 Vial 0    famotidine (PEPCID) 20 mg tablet Take 1 Tab by mouth nightly. 30 Tab 0    polyethylene glycol (MIRALAX) 17 gram packet Take 1 Packet by mouth two (2) times a day. 30 Packet 0    cholecalciferol (VITAMIN D3) 1,000 unit tablet Take 2 Tabs by mouth daily. Indications: PREVENTION OF VITAMIN D DEFICIENCY 30 Tab 0    ondansetron (ZOFRAN ODT) 4 mg disintegrating tablet Take 4 mg by mouth every eight (8) hours as needed for Nausea.  acetaminophen (TYLENOL) 325 mg tablet Take 2 Tabs by mouth every four (4) hours as needed (for fever or pain level less than 5/10). Indications: Fever, Pain 30 Tab 0    senna-docusate (PERICOLACE) 8.6-50 mg per tablet Take 2 Tabs by mouth daily (after dinner). Indications: Constipation 30 Tab 0    pravastatin (PRAVACHOL) 40 mg tablet Take 40 mg by mouth nightly.  Indications: DYSLIPIDEMIA      insulin aspart (NOVOLOG) 100 unit/mL injection INITIATE INSULIN CORRECTIVE PROTOCOL (HR): Normal Insulin Sensitivity  For Blood Sugar (mg/dL) of:    Less than 150 =   0 units          150 -199 =   2 units 200 -249 =   4 units 250 -299 =   6 units 300 -349 =   8 units 350 and above =   10 units If 2 glucose readings are above 200 mg/dL 10 mL 6       Vital signs:    Visit Vitals    /62 (BP 1 Location: Left arm, BP Patient Position: Sitting)    Pulse 82    Temp 98.1 °F (36.7 °C) (Oral)    Resp 12    Ht 5' 7.5\" (1.715 m)    Wt 98 kg (216 lb)  Comment: w/c- weight per patient    SpO2 98%    BMI 33.33 kg/m2       Review of Systems:   As above otherwise 11 point review of systems negative including;   Constitutional no fever or chills  Skin denies rash or itching  HEENT  Denies tinnitus, hearing lose  Eyes denies diplopia vision lose  Respiratory denies sortness of breath  Cardiovascular denies chest pain, dyspnea on exertion  Gastrointestinal denies nausea, vomiting, diarrhea, constipation  Genitourinary denies incontinence  Musculoskeletal denies joint pain or swelling  Endocrine denies weight change  Hematology denies easy bruising or bleeding   Neurological as above in HPI      Patient Active Problem List   Diagnosis Code    Nonischemic cardiomyopathy ([facility] I42.8    History of complete heart block Z86.79    Biventricular implantable cardioverter-defibrillator in situ Z95.810    Left bundle branch block (LBBB) on electrocardiogram I44.7    Type 2 diabetes mellitus with diabetic neuropathy ([facility]) E11.40    Dyslipidemia E78.5    Diabetic neuropathy associated with type 2 diabetes mellitus ([facility] E11.40    Obstructive sleep apnea on CPAP G47.33, Z99.89    AICD generator infection ([facility] T82. 7XXA    Difficult airway for intubation T88. 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 1XXA    Impaired mobility and ADLs Z74.09    History of Coumadin therapy Z79.01    Gout M10.9    Acute paraplegia (HCC) G82.20    Sepsis ([facility] A41.9    Psoas abscess, right ([address] K68.12    Krueger catheter in place on admission Z96.0    Urinary tract infection due to Enterococcus N39.0, B95.2    Group B streptococcal infection A49.1    Hypervolemia E87.70    Anemia D64.9         Objective: The patient is awake, alert, and oriented x 4. Fund of knowledge is adequate. Speech is fluent and memory is intact. Cranial Nerves: II  Visual fields are full to confrontation. III, IV, VI  Extraocular movements are intact. There is no nystagmus. V  Facial sensation is intact to pinprick. VII  Face is symmetrical.  VIII - Hearing is present. IX, X, XII  Palate is symmetrical.   XI - Shoulder shrugging and head turning intact  Motor: The patient moves upper limbs fairly well and symmetrically. She has about 2/5 strength in the left and 1/5 strength in the right leg diffusely, stronger proximally than distally. Tone is normal. Reflexes are trace+ and symmetrical. Plantars are down going. Gait is not testable, she's wheel chair bound for now.     CBC:   Lab Results   Component Value Date/Time    WBC 6.2 06/19/2017 05:00 AM    RBC 3.29 06/19/2017 05:00 AM    HGB 8.8 06/19/2017 05:00 AM    HCT 26.9 06/19/2017 05:00 AM    PLATELET 154 18/73/6626 05:00 AM     BMP:   Lab Results   Component Value Date/Time    Glucose 153 06/19/2017 05:00 AM    Sodium 138 06/19/2017 05:00 AM    Potassium 4.2 06/19/2017 05:00 AM    Chloride 103 06/19/2017 05:00 AM    CO2 28 06/19/2017 05:00 AM    BUN 23 06/19/2017 05:00 AM    Creatinine 1.09 06/19/2017 05:00 AM    Calcium 9.6 06/19/2017 05:00 AM     CMP:   Lab Results   Component Value Date/Time    Glucose 153 06/19/2017 05:00 AM    Sodium 138 06/19/2017 05:00 AM    Potassium 4.2 06/19/2017 05:00 AM    Chloride 103 06/19/2017 05:00 AM    CO2 28 06/19/2017 05:00 AM    BUN 23 06/19/2017 05:00 AM    Creatinine 1.09 06/19/2017 05:00 AM    Calcium 9.6 06/19/2017 05:00 AM    Anion gap 7 06/19/2017 05:00 AM    BUN/Creatinine ratio 21 06/19/2017 05:00 AM    Alk. phosphatase 199 06/07/2017 05:51 AM    Protein, total 6.1 06/07/2017 05:51 AM    Albumin 1.9 06/07/2017 05:51 AM    Globulin 4.2 06/07/2017 05:51 AM    A-G Ratio 0.5 06/07/2017 05:51 AM     Coagulation:   Lab Results   Component Value Date/Time    Prothrombin time 15.1 06/06/2017 07:40 PM    INR 1.2 06/06/2017 07:40 PM    aPTT 42.6 06/06/2017 07:40 PM     Cardiac markers:   Lab Results   Component Value Date/Time    CK 33 06/06/2017 07:40 PM    CK-MB Index 3.3 06/06/2017 07:40 PM       Assessment:  Spinal cord infarction in this patient who has risk factors including psoas abscess. She seems to be doing well considering the type of injury that she had. Plan:  From my standpoint not a lot to add at this time. Continue rehab efforts. Will see back here in 6 months. Sincerely,        Rogers Torres.  Jefferson Webster M.D.

## 2017-08-15 NOTE — LETTER
8/15/2017 3:25 PM 
 
Patient:  Clemencia Pace YOB: 1950 Date of Visit: 8/15/2017 Dear Shital Roe DO 
[address] [facility] [address] [facility] [address] [id number] [place] [address] VIA Facsimile: [phone] 
 : Thank you for referring Ms. Daphnie Acuña to me for evaluation/treatment. Below are the relevant portions of my assessment and plan of care. Re:  Yanick Gonzalez,Follow up visit     8/15/2017 3:12 PM 
 
SSN: [id number] Subjective:   Clemencia Pace is seen in follow up for her paraparesis. 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 This was back in March. Since that time she's spent some time in rehabilitation. Her only major complaint is a burning pain when she's turned onto her right hip. She's currently living at home. She takes tylenol for the pain which helps. Medications:   
Current Outpatient Prescriptions Medication Sig Dispense Refill  SITagliptin (JANUVIA) 50 mg tablet Take 50 mg by mouth daily.  loperamide (IMODIUM) 1 mg/5 mL solution Take 10 mL by mouth four (4) times daily as needed for Diarrhea. 60 mL 0  
 ferrous sulfate 325 mg (65 mg iron) tablet Take 1 Tab by mouth two (2) times daily (with meals). 100 Tab 0  
 Lactobacillus Acidoph & Bulgar (FLORANEX) 1 million cell tab tablet Take 1 Tab by mouth two (2) times a day. 60 Tab 0  
 oxyCODONE-acetaminophen (PERCOCET 7.5) 7.5-325 mg per tablet Take 1 Tab by mouth every six (6) hours as needed (for pain level greater than 5/10). Max Daily Amount: 4 Tabs. Indications: Pain 60 Tab 0  
 furosemide (LASIX) 40 mg tablet 1 tab daily  Indications: Edema 60 Tab 0  
 gabapentin (NEURONTIN) 300 mg capsule Take 2 Caps by mouth two (2) times a day. Indications: NEUROPATHIC PAIN 100 Cap 0  
 insulin lispro (HUMALOG) 100 unit/mL injection See sliding scale 1 Vial 0  
 magnesium oxide (MAG-OX) 400 mg tablet Take 1 Tab by mouth two (2) times a day.  60 Tab 0  
  potassium chloride (K-DUR, KLOR-CON) 20 mEq tablet Take 1 Tab by mouth two (2) times a day. 30 Tab 0  
 albuterol-ipratropium (DUO-NEB) 2.5 mg-0.5 mg/3 ml nebu 3 mL by Nebulization route four (4) times daily. 30 Nebule 0  
 allopurinol (ZYLOPRIM) 100 mg tablet Take 0.5 Tabs by mouth daily. Indications: HYPERURICEMIA 30 Tab 0  
 midodrine (PROAMITINE) 2.5 mg tablet 5 mg [2 tablets] po three time a day for 5 days then 2.5 mg po three time a day x 5 days then stop 45 Tab 0  
 aluminum-magnesium hydroxide (MAALOX) 200-200 mg/5 mL suspension Take 15 mL by mouth four (4) times daily as needed for Indigestion. 188 mL 0  
 folic acid (FOLVITE) 1 mg tablet Take 1 Tab by mouth daily. 30 Tab 0  
 insulin glargine (LANTUS) 100 unit/mL injection 5 Units by SubCUTAneous route nightly. Indications: type 2 diabetes mellitus (Patient taking differently: 10 Units by SubCUTAneous route nightly. Indications: type 2 diabetes mellitus) 1 Vial 0  
 famotidine (PEPCID) 20 mg tablet Take 1 Tab by mouth nightly. 30 Tab 0  
 polyethylene glycol (MIRALAX) 17 gram packet Take 1 Packet by mouth two (2) times a day. 30 Packet 0  
 cholecalciferol (VITAMIN D3) 1,000 unit tablet Take 2 Tabs by mouth daily. Indications: PREVENTION OF VITAMIN D DEFICIENCY 30 Tab 0  
 ondansetron (ZOFRAN ODT) 4 mg disintegrating tablet Take 4 mg by mouth every eight (8) hours as needed for Nausea.  acetaminophen (TYLENOL) 325 mg tablet Take 2 Tabs by mouth every four (4) hours as needed (for fever or pain level less than 5/10). Indications: Fever, Pain 30 Tab 0  
 senna-docusate (PERICOLACE) 8.6-50 mg per tablet Take 2 Tabs by mouth daily (after dinner). Indications: Constipation 30 Tab 0  pravastatin (PRAVACHOL) 40 mg tablet Take 40 mg by mouth nightly. Indications: DYSLIPIDEMIA  insulin aspart (NOVOLOG) 100 unit/mL injection INITIATE INSULIN CORRECTIVE PROTOCOL (HR): Normal Insulin Sensitivity  For Blood Sugar (mg/dL) of:    Less than 150 =   0 units          150 -199 =   2 units 200 -249 =   4 units 250 -299 =   6 units 300 -349 =   8 units 350 and above =   10 units If 2 glucose readings are above 200 mg/dL 10 mL 6 Vital signs:   
Visit Vitals  /62 (BP 1 Location: Left arm, BP Patient Position: Sitting)  Pulse 82  Temp 98.1 °F (36.7 °C) (Oral)  Resp 12  Ht 5' 7.5\" (1.715 m)  Wt 98 kg (216 lb) Comment: w/c- weight per patient  SpO2 98%  BMI 33.33 kg/m2 Review of Systems: As above otherwise 11 point review of systems negative including;  
Constitutional no fever or chills Skin denies rash or itching HEENT  Denies tinnitus, hearing lose Eyes denies diplopia vision lose Respiratory denies sortness of breath Cardiovascular denies chest pain, dyspnea on exertion Gastrointestinal denies nausea, vomiting, diarrhea, constipation Genitourinary denies incontinence Musculoskeletal denies joint pain or swelling Endocrine denies weight change Hematology denies easy bruising or bleeding Neurological as above in HPI Patient Active Problem List  
Diagnosis Code  Nonischemic cardiomyopathy (HCC) I42.8  History of complete heart block Z86.79  Biventricular implantable cardioverter-defibrillator in situ Z95.810  Left bundle branch block (LBBB) on electrocardiogram I44.7  Type 2 diabetes mellitus with diabetic neuropathy ([facility]) E11.40  Dyslipidemia E78.5  Diabetic neuropathy associated with type 2 diabetes mellitus ([facility]) E11.40  Obstructive sleep apnea on CPAP G47.33, Z99.89  
 AICD generator infection ([facility] T82. 7XXA  Difficult airway for intubation T88. 4XXA  Benign hypertensive heart disease with systolic CHF, NYHA class 2 ([facility]) I11.0, I50.20  Decreased calculated glomerular filtration rate (GFR) R94.4  Chronic anemia D64.9  
 History of deep venous thrombosis Z86.718  Anticoagulated on Coumadin Z51.81, Z79.01  
 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  
 Obesity (BMI 35.0-39.9 without comorbidity) E66.9  
 History of pyelonephritis Z87.440  Iliopsoas muscle hematoma S70.10XA  Psoas hematoma, right, secondary to anticoagulant therapy S30. Lux Sprinkles  Impaired mobility and ADLs Z74.09  
 History of Coumadin therapy Z79.01  
 Gout M10.9  Acute paraplegia (HCC) G82.20  Sepsis ([address] A41.9  Psoas abscess, right ([address] K68.12  
 Krueger catheter in place on admission Z96.0  
 Urinary tract infection due to Enterococcus N39.0, B95.2  Group B streptococcal infection A49.1  Hypervolemia E87.70  Anemia D64.9 Objective: The patient is awake, alert, and oriented x 4. Fund of knowledge is adequate. Speech is fluent and memory is intact. Cranial Nerves: II  Visual fields are full to confrontation. III, IV, VI  Extraocular movements are intact. There is no nystagmus. V  Facial sensation is intact to pinprick. VII  Face is symmetrical.  VIII - Hearing is present. IX, X, XII  Palate is symmetrical.   XI - Shoulder shrugging and head turning intact Motor: The patient moves upper limbs fairly well and symmetrically. She has about 2/5 strength in the left and 1/5 strength in the right leg diffusely, stronger proximally than distally. Tone is normal. Reflexes are trace+ and symmetrical. Plantars are down going. Gait is not testable, she's wheel chair bound for now. CBC:  
Lab Results Component Value Date/Time WBC 6.2 06/19/2017 05:00 AM  
 RBC 3.29 06/19/2017 05:00 AM  
 HGB 8.8 06/19/2017 05:00 AM  
 HCT 26.9 06/19/2017 05:00 AM  
 PLATELET 641 02/58/3121 05:00 AM  
 
BMP:  
Lab Results Component Value Date/Time  Glucose 153 06/19/2017 05:00 AM  
 Sodium 138 06/19/2017 05:00 AM  
 Potassium 4.2 06/19/2017 05:00 AM  
 Chloride 103 06/19/2017 05:00 AM  
 CO2 28 06/19/2017 05:00 AM  
 BUN 23 06/19/2017 05:00 AM  
 Creatinine 1.09 06/19/2017 05:00 AM  
 Calcium 9.6 06/19/2017 05:00 AM  
 
CMP:  
Lab Results Component Value Date/Time Glucose 153 06/19/2017 05:00 AM  
 Sodium 138 06/19/2017 05:00 AM  
 Potassium 4.2 06/19/2017 05:00 AM  
 Chloride 103 06/19/2017 05:00 AM  
 CO2 28 06/19/2017 05:00 AM  
 BUN 23 06/19/2017 05:00 AM  
 Creatinine 1.09 06/19/2017 05:00 AM  
 Calcium 9.6 06/19/2017 05:00 AM  
 Anion gap 7 06/19/2017 05:00 AM  
 BUN/Creatinine ratio 21 06/19/2017 05:00 AM  
 Alk. phosphatase 199 06/07/2017 05:51 AM  
 Protein, total 6.1 06/07/2017 05:51 AM  
 Albumin 1.9 06/07/2017 05:51 AM  
 Globulin 4.2 06/07/2017 05:51 AM  
 A-G Ratio 0.5 06/07/2017 05:51 AM  
 
Coagulation:  
Lab Results Component Value Date/Time Prothrombin time 15.1 06/06/2017 07:40 PM  
 INR 1.2 06/06/2017 07:40 PM  
 aPTT 42.6 06/06/2017 07:40 PM  
 
Cardiac markers:  
Lab Results Component Value Date/Time CK 33 06/06/2017 07:40 PM  
 CK-MB Index 3.3 06/06/2017 07:40 PM  
 
 
Assessment:  Spinal cord infarction in this patient who has risk factors including psoas abscess. She seems to be doing well considering the type of injury that she had. Plan:  From my standpoint not a lot to add at this time. Continue rehab efforts. Will see back here in 6 months. Sincerely, 
 
 
 
Anna Otto. Madina Rice M.D. If you have questions, please do not hesitate to call me. I look forward to following Ms. Yenifer Chawlabeth along with you.  
 
 
 
Sincerely, 
 
 
Amee Ivy MD

## 2017-09-11 ENCOUNTER — CLINICAL SUPPORT (OUTPATIENT)
Dept: CARDIOLOGY CLINIC | Age: 67
End: 2017-09-11

## 2017-09-11 ENCOUNTER — OFFICE VISIT (OUTPATIENT)
Dept: CARDIOLOGY CLINIC | Age: 67
End: 2017-09-11

## 2017-09-11 VITALS
DIASTOLIC BLOOD PRESSURE: 72 MMHG | OXYGEN SATURATION: 98 % | HEIGHT: 67 IN | BODY MASS INDEX: 33.9 KG/M2 | HEART RATE: 72 BPM | SYSTOLIC BLOOD PRESSURE: 122 MMHG | WEIGHT: 216 LBS

## 2017-09-11 DIAGNOSIS — I11.0 HYPERTENSIVE HEART DISEASE WITH HEART FAILURE (HCC): ICD-10-CM

## 2017-09-11 DIAGNOSIS — I44.7 LEFT BUNDLE BRANCH BLOCK (LBBB) ON ELECTROCARDIOGRAM: Chronic | ICD-10-CM

## 2017-09-11 DIAGNOSIS — I50.22 CHRONIC SYSTOLIC HEART FAILURE (HCC): Primary | Chronic | ICD-10-CM

## 2017-09-11 DIAGNOSIS — G82.20 PARAPLEGIA (HCC): ICD-10-CM

## 2017-09-11 DIAGNOSIS — I42.8 NONISCHEMIC CARDIOMYOPATHY (HCC): Chronic | ICD-10-CM

## 2017-09-11 DIAGNOSIS — Z95.0 BIVENTRICULAR CARDIAC PACEMAKER IN SITU: ICD-10-CM

## 2017-09-11 DIAGNOSIS — I42.8 NONISCHEMIC CARDIOMYOPATHY (HCC): Primary | Chronic | ICD-10-CM

## 2017-09-11 DIAGNOSIS — E78.5 DYSLIPIDEMIA: Chronic | ICD-10-CM

## 2017-09-11 NOTE — PROGRESS NOTES
1. Have you been to the ER, urgent care clinic since your last visit? Hospitalized since your last visit? Yes, [facility] 6-6-17/6-22-17 Anemia    2. Have you seen or consulted any other health care providers outside of the [address] [place] [address] since your last visit? Include any pap smears or colon screening.  no

## 2017-09-11 NOTE — PROGRESS NOTES
HPI:  I saw Columba Alvaro Gandara in my office today in cardiovascular evaluation regarding her dilated nonischemic cardiomyopathy. Ms. Bro Gandara is a very pleasant 79year old fair skinned  female with history of a dilated cardiomyopathy with mild left ventricular dysfunction with an ejection fraction of 40-45% range with widely patent coronary arteries on a cardiac catheterization back in August of 1996. Her very last echocardiogram in June of 2015 showed that her ejection fraction was still very mildly decreased at 45-50%. She did have a biventricular AICD placed, but due to trauma and infection in that area that had to be removed and now she has a biventricular pacemaker with a pulse generator residing in her left upper quadrant, placed back in October of 2012. Unfortunately, she fell at home and traumatized her back developed an infected right psoas hematoma with development of an epidural abscess with neurologic compromise resulting in paraplegia back in April 2017. She had a prolonged hospitalization but has done reasonably well recently and is in today without any cardiovascular complaints except for some mild leg swelling and some mild chronic orthopnea. Encounter Diagnoses   Name Primary?  Chronic systolic heart failure (HCC) Yes    Nonischemic cardiomyopathy ([address]     Hypertensive heart disease with heart failure (HCC)     Left bundle branch block (LBBB) on electrocardiogram     Dyslipidemia     Paraplegia ([address]        Discussion: This lady appears to be doing reasonably well on her current medical therapy with chronic stable systolic heart failure. Her last echocardiogram demonstrated an ejection fraction in the 45% range when completed in April 2017. She seems to be tolerating her current medical regimen well for her heart failure issues which simply includes Lasix 40 mg daily and K Dur 20 mEq 2 times daily as well as Mag-Ox 1 by mouth twice daily.     She does have multiple allergies but beta-blockers do not appear to be among and in view of her LV dysfunction getting her on a medication such as Toprol or Coreg seems reasonable and I am going to have to check the old records to see why those medications have not been used. She does have a dyslipidemia, but I do not have a recent lipid profile and I am going to see if I can get one from her family physician. She has been on Pravachol 40 mg daily for treatment of this problem. We did check her biventricular AICD today and this seems to be functioning appropriately with only a couple of short episodes of atrial fibrillation lasting for a minute and a half at most in the last few months and atrial sensing and biventricular pacing throughout the evaluation. Since she is otherwise doing well I will see her again in several months. PCP: Daniel Calix DO        Past Medical History:   Diagnosis Date    Acute paraplegia ([facility] 4/20/2017    Benign hypertensive heart disease with systolic CHF, NYHA class 2 ([facility] 9/5/2012    Biventricular implantable cardioverter-defibrillator in situ 04/28/2005    Upgraded to BiV AICD; gen change 4/2008; pocket revision 10/2009; Abdominal - done on 8/22/2012 by Dr. Lara Better Cardiac cath 08/15/1996    Patent coronaries. Elev LVEDP. EF 50-55%.  Cardiac echocardiogram 06/23/2015    Ltd study. EF 45-50%. Mild, diffuse hypk. Severe apical hypk. No mass or thrombus was clearly identified, although imaging was suboptimal.      Cardiac nuclear imaging test 06/19/2015    Fixed distal apical, distal septal defect more likely due to RV pacing than prior infarct. No ischemia. EF 46%. RWMA c/w RV pacing. Nondiagnostic EKG on pharm stress test.      Cardiovascular lower extremity venous duplex 09/04/2012    Acute, non-occlusive DVT in CFV on right. No DVT on left. No superficial thrombosis bilaterally.     Cardiovascular upper extremity venous duplex 08/27/2012    DVT in axillary vein on left. Left subclavian was not visualized.  Chronic anemia 9/5/2012    Chronic systolic heart failure (HCC)     Decreased calculated glomerular filtration rate (GFR) 3/30/2017    Calculated GFR equivalent to that of CKD stage 3 = 30-59 ml/min    Diabetic neuropathy associated with type 2 diabetes mellitus ([facility] 6/28/2011    Difficult airway for intubation 08/22/2012    see anesthesia airway note    Dyslipidemia 6/28/2011    Gout     History of complete heart block 6/28/2011    History of Coumadin therapy     Anticoagulation for DVT of the LUE; Discontinued on 3/30/2017    History of deep venous thrombosis 9/5/2012    Left upper extremity    History of pyelonephritis 3/30/2017    Left bundle branch block (LBBB) on electrocardiogram 6/28/2011    Nonischemic cardiomyopathy ([address] 6/28/2011    Obesity (BMI 35.0-39.9 without comorbidity) 3/13/2017    Obstructive sleep apnea on CPAP 2/7/2012    Psoas abscess, right ([address] 4/20/2017    Psoas hematoma, right, secondary to anticoagulant therapy 3/30/2017    Type 2 diabetes mellitus with diabetic neuropathy ([address] 6/28/2011         Past Surgical History:   Procedure Laterality Date    HX CARPAL TUNNEL RELEASE  4/07    right     HX CHOLECYSTECTOMY  1994    HX HYSTERECTOMY  1973    HX OTHER SURGICAL  6/11/2012    AICD revision    HX PACEMAKER  4/28/2005    [employer] AICD         Current Outpatient Rx   Name  Route  Sig  Dispense  Refill    pravastatin (PRAVACHOL) 40 mg tablet    Oral    Take 40 mg by mouth nightly.  cyclobenzaprine (FLEXERIL) 10 mg tablet    Oral    Take 10 mg by mouth as needed.  warfarin (COUMADIN) 2 mg tablet        Take 3 tablets by mouth daily or as directed by our office. 90 Tab    6      OTHER    Oral    Take 2 Tabs by mouth two (2) times a day.  NeuRx-TF - total formulation for peripheral nerve health              bumetanide (BUMEX) 1 mg tablet    Oral    Take 1 Tab by mouth two (2) times a day.    180 Tab    3      potassium chloride (KLOR-CON M20) 20 mEq tablet    Oral    Take 2 Tabs by mouth two (2) times a day. Or as directed    360 Tab    3      gabapentin (NEURONTIN) 300 mg tablet    Oral    Take 300 mg by mouth two (2) times daily as needed.  carvedilol (COREG) 25 mg tablet    Oral    Take 25 mg by mouth two (2) times a day.  insulin glargine (LANTUS) 100 unit/mL injection    SubCUTAneous    20 Units by SubCUTAneous route two (2) times a day.  allopurinol (ZYLOPRIM) 100 mg tablet    Oral    Take 100 mg by mouth two (2) times a day.  sitaGLIPtin (JANUVIA) 50 mg tablet    Oral    Take 1 Tab by mouth daily. 15 Tab    0      ferrous sulfate 325 mg (65 mg iron) tablet    Oral    Take 1 Tab by mouth two (2) times daily (with meals). 30 Tab    0      insulin aspart (NOVOLOG) 100 unit/mL injection        INITIATE INSULIN CORRECTIVE PROTOCOL (HR): Normal Insulin Sensitivity  For Blood Sugar (mg/dL) of:    Less than 150 =   0 units          150 -199 =   2 units 200 -249 =   4 units 250 -299 =   6 units 300 -349 =   8 units 350 and above =   10 units If 2 glucose readings are above 200 mg/dL    10 mL    6      Cholecalciferol, Vitamin D3, (VITAMIN D) 5,000 unit Tab    Oral    Take 1 Tab by mouth daily.                    Allergies   Allergen Reactions    Vancomycin Itching    Ampicillin Itching    Bactrim [Sulfamethoxazole-Trimethoprim] Unknown (comments)    Blueberry Swelling     Causes throat swelling    Ciprofloxacin Itching    Codeine Other (comments)     Jumpy feeling    Crestor [Rosuvastatin] Itching    Darvocet A500 [Propoxyphene N-Acetaminophen] Itching    Demerol [Meperidine] Itching    Levaquin [Levofloxacin] Itching    Lipitor [Atorvastatin] Myalgia    Magnesium Oxide Itching     nausea    Minocin [Minocycline] Unknown (comments)    Pcn [Penicillins] Itching    Pravachol [Pravastatin] Swelling     Swelling in mouth     Shellfish Derived Unknown (comments)    Sulfa (Sulfonamide Antibiotics) Itching    Ultracet [Tramadol-Acetaminophen] Itching    Vicodin [Hydrocodone-Acetaminophen] Unknown (comments)    Vytorin 10-10 [Ezetimibe-Simvastatin] Myalgia    Percodan [Oxycodone Hcl-Oxycodone-Asa] Itching         Social   Social History   Substance Use Topics    Smoking status: Never Smoker    Smokeless tobacco: Never Used    Alcohol use No         Family history: family history includes Cancer in her father. Review of Systems:     Constitutional: Negative for chills, diaphoresis, fever, malaise/fatigue and weight loss. Respiratory: Negative. Cardiovascular: Positive for orthopnea and leg swelling. Negative for chest pain, palpitations and PND. Gastrointestinal: Negative. Musculoskeletal: Positive for back pain, falls, joint pain and myalgias. Negative for neck pain. Neurological: Positive for weakness. Physical Exam:   The patient is an alert, oriented, well developed, well nourished 79 y.o.  female who was in no acute distress at the time of my examination. Visit Vitals    /72    Pulse 72    Ht 5' 7\" (1.702 m)    Wt 98 kg (216 lb)    SpO2 98%    BMI 33.83 kg/m2      BP Readings from Last 3 Encounters:   09/11/17 122/72   08/15/17 120/62   06/22/17 114/70        Wt Readings from Last 3 Encounters:   09/11/17 98 kg (216 lb)   08/15/17 98 kg (216 lb)   06/22/17 106.1 kg (234 lb)       HEENT: Conjunctivae pink, sclera clear and white. Neck: Supple without masses or tenderness. There is mild thyromegaly. No jugular venous distention. Carotid upstrokes are full bilaterally, without bruits. Cardiovascular: Chest is symmetrical with good excursion. There  keloid over the incision in left upper chest in former pacemaker site. Kennard is displaced between the MCL and AAL. No lifts, heaves, or thrills felt on palpation.   Normal S1 and S2 with a paradoxical splitting of the S2, with a grade II/VI DEE along the left sternal border, with radiation to the base without diastolic murmur. Lungs: Clear to auscultation in all fields. Abdomen: Protuberant and soft non tender. It was not adequately evaluated since the patient was in a wheelchair at the time of evaluation. Extremities: No edema with palpible peripheral pulses. Neurologic exam: was not completed but the patient is a paraplegic. Review of Data: Please refer to past medical history for most recent cardiac testing. Results for orders placed or performed in visit on 09/11/17   AMB POC EKG ROUTINE W/ 12 LEADS, INTER & REP     Status: None    Narrative    Normal sinus mechanism with atrial sensing and ventricular pacing with a rate of 72. Charlena Schaumann, D.O., F.A.C.C. [facility]  [facility] [place]  [address] [place] [address]. [address]    PLEASE NOTE:  This document has been produced using voice recognition software. Unrecognized errors in transcription may be present.

## 2017-09-11 NOTE — MR AVS SNAPSHOT
Visit Information Date & Time Provider Department Dept. Phone Encounter #  
 9/11/2017 10:20 AM Demian Miles DO [facility] [id number] Follow-up Instructions Return in about 6 months (around 3/11/2018), or if symptoms worsen or fail to improve. Your Appointments 9/15/2017 10:00 AM  
New Patient with Malvin Velez MD  
[facility] ([address]) Appt Note: 3 wk fu per Dr Landers Standing; Called patient about cob. lvm for her to return my call -mcf 4/14/17; pt r/s discharged from rehab has [facility] rehab  
 [address] [facility] [address] 
[address] [facility] [address]  
[address] [facility] [address] [facility] [address] 2/13/2018  9:40 AM  
Follow Up with Lola Dye MD  
[facility] [address] at [address] [facility] [address]) Appt Note: 6 month fu  
 [address] [facility] [address] [facility] [address] [facility] [address] [place] [address]  
  
    
 3/12/2018  2:40 PM  
POST HOSPITAL with Demian Miles DO [facility] ([address]) Appt Note: 6 month f/up [place] [id number]  
[phone] [address] [facility] [address] [facility] [address] Upcoming Health Maintenance Date Due Hepatitis C Screening 1950 FOOT EXAM Q1 7/27/1960 MICROALBUMIN Q1 7/27/1960 EYE EXAM RETINAL OR DILATED Q1 7/27/1960 DTaP/Tdap/Td series (1 - Tdap) 7/27/1971 ZOSTER VACCINE AGE 60> 5/27/2010 BREAST CANCER SCRN MAMMOGRAM 1/19/2014 GLAUCOMA SCREENING Q2Y 7/27/2015 OSTEOPOROSIS SCREENING (DEXA) 7/27/2015 Pneumococcal 65+ Low/Medium Risk (1 of 2 - PCV13) 7/27/2015 MEDICARE YEARLY EXAM 7/27/2015 LIPID PANEL Q1 11/6/2015 INFLUENZA AGE 9 TO ADULT 8/1/2017 HEMOGLOBIN A1C Q6M 9/30/2017 FOBT Q 1 YEAR AGE 50-75 6/15/2018 Allergies as of 9/11/2017  Review Complete On: 9/11/2017 By: Lux Quinn DO Severity Noted Reaction Type Reactions Vancomycin High 08/17/2012   Side Effect Itching Ampicillin  06/11/2010    Itching Bactrim [Sulfamethoxazole-trimethoprim]  06/11/2010    Unknown (comments) Blueberry  03/31/2017    Swelling Causes throat swelling Ciprofloxacin  06/11/2010    Itching Codeine  06/11/2010    Other (comments) Jumpy feeling Crestor [Rosuvastatin]  06/11/2010    Itching Darvocet A500 [Propoxyphene N-acetaminophen]  06/11/2010    Itching Demerol [Meperidine]  06/11/2010    Itching Levaquin [Levofloxacin]  05/20/2014    Itching Lipitor [Atorvastatin]  06/11/2010    Myalgia Magnesium Oxide  06/28/2013    Itching  
 nausea Minocin [Minocycline]  06/11/2010    Unknown (comments) Pcn [Penicillins]  06/11/2010    Itching Pravachol [Pravastatin]  06/11/2010    Swelling Swelling in mouth Shellfish Derived  06/07/2017    Unknown (comments) Sulfa (Sulfonamide Antibiotics)  06/11/2010    Itching Ultracet [Tramadol-acetaminophen]  06/11/2010    Itching Vicodin [Hydrocodone-acetaminophen]  06/11/2010    Unknown (comments) Vytorin 10-10 [Ezetimibe-simvastatin]  06/11/2010    Myalgia Percodan [Oxycodone Hcl-oxycodone-asa] Low 06/11/2010   Side Effect Itching Current Immunizations  Reviewed on 5/2/2017 No immunizations on file. Not reviewed this visit You Were Diagnosed With   
  
 Codes Comments Chronic systolic heart failure (HCC)    -  Primary ICD-10-CM: R58.84 ICD-9-CM: 428.22 Nonischemic cardiomyopathy ([facility]     ICD-10-CM: I42.8 ICD-9-CM: 425.4 Hypertensive heart disease with heart failure ([facility]     ICD-10-CM: I11.0 ICD-9-CM: 402.91, 428.9 Left bundle branch block (LBBB) on electrocardiogram     ICD-10-CM: I44.7 ICD-9-CM: 426. 3 Dyslipidemia     ICD-10-CM: E78.5 ICD-9-CM: 272.4 Paraplegia ([facility]     ICD-10-CM: G82.20 ICD-9-CM: 344. 1 Vitals BP Pulse Height(growth percentile) Weight(growth percentile) SpO2 BMI  
 122/72 72 5' 7\" (1.702 m) 216 lb (98 kg) 98% 33.83 kg/m2 OB Status Smoking Status Hysterectomy Never Smoker Vitals History BMI and BSA Data Body Mass Index Body Surface Area  
 33.83 kg/m 2 2.15 m 2 Preferred Pharmacy Pharmacy Name Phone [address] [facility] [phone] Your Updated Medication List  
  
   
This list is accurate as of: 9/11/17 11:20 AM.  Always use your most recent med list.  
  
  
  
  
 acetaminophen 325 mg tablet Commonly known as:  TYLENOL Take 2 Tabs by mouth every four (4) hours as needed (for fever or pain level less than 5/10). Indications: Fever, Pain  
  
 albuterol-ipratropium 2.5 mg-0.5 mg/3 ml Nebu Commonly known as:  DUO-NEB  
3 mL by Nebulization route four (4) times daily. allopurinol 100 mg tablet Commonly known as:  Navarrete Rboert Take 0.5 Tabs by mouth daily. Indications: HYPERURICEMIA  
  
 aluminum-magnesium hydroxide 200-200 mg/5 mL suspension Commonly known as:  MAALOX Take 15 mL by mouth four (4) times daily as needed for Indigestion. cholecalciferol 1,000 unit tablet Commonly known as:  VITAMIN D3 Take 2 Tabs by mouth daily. Indications: PREVENTION OF VITAMIN D DEFICIENCY  
  
 famotidine 20 mg tablet Commonly known as:  PEPCID Take 1 Tab by mouth nightly. ferrous sulfate 325 mg (65 mg iron) tablet Take 1 Tab by mouth two (2) times daily (with meals). folic acid 1 mg tablet Commonly known as:  Google Take 1 Tab by mouth daily. furosemide 40 mg tablet Commonly known as:  LASIX  
1 tab daily  Indications: Edema  
  
 gabapentin 300 mg capsule Commonly known as:  NEURONTIN Take 2 Caps by mouth two (2) times a day.  Indications: NEUROPATHIC PAIN  
  
 insulin aspart 100 unit/mL injection Commonly known as:  Kelsie Rudd INITIATE INSULIN CORRECTIVE PROTOCOL (HR): Normal Insulin Sensitivity  For Blood Sugar (mg/dL) of:    Less than 150 =   0 units          150 -199 =   2 units 200 -249 =   4 units 250 -299 =   6 units 300 -349 =   8 units 350 and above =   10 units If 2 glucose readings are above 200 mg/dL  
  
 insulin glargine 100 unit/mL injection Commonly known as:  LANTUS  
5 Units by SubCUTAneous route nightly. Indications: type 2 diabetes mellitus  
  
 insulin lispro 100 unit/mL injection Commonly known as:  HUMALOG See sliding scale JANUVIA 50 mg tablet Generic drug:  SITagliptin Take 50 mg by mouth daily. Lactobacillus Acidoph & Bulgar 1 million cell Tab tablet Commonly known as:  Maksim Escobar Take 1 Tab by mouth two (2) times a day. loperamide 1 mg/5 mL solution Commonly known as:  IMODIUM Take 10 mL by mouth four (4) times daily as needed for Diarrhea.  
  
 magnesium oxide 400 mg tablet Commonly known as:  MAG-OX Take 1 Tab by mouth two (2) times a day. midodrine 2.5 mg tablet Commonly known as:  PROAMITINE  
5 mg [2 tablets] po three time a day for 5 days then 2.5 mg po three time a day x 5 days then stop  
  
 oxyCODONE-acetaminophen 7.5-325 mg per tablet Commonly known as:  PERCOCET 7.5 Take 1 Tab by mouth every six (6) hours as needed (for pain level greater than 5/10). Max Daily Amount: 4 Tabs. Indications: Pain  
  
 polyethylene glycol 17 gram packet Commonly known as:  Marcellina Dull Take 1 Packet by mouth two (2) times a day. potassium chloride 20 mEq tablet Commonly known as:  K-DUR, KLOR-CON Take 1 Tab by mouth two (2) times a day. pravastatin 40 mg tablet Commonly known as:  PRAVACHOL Take 40 mg by mouth nightly. Indications: DYSLIPIDEMIA  
  
 senna-docusate 8.6-50 mg per tablet Commonly known as:  Jyl Orf  
 Take 2 Tabs by mouth daily (after dinner). Indications: Constipation ZOFRAN ODT 4 mg disintegrating tablet Generic drug:  ondansetron Take 4 mg by mouth every eight (8) hours as needed for Nausea. We Performed the Following AMB POC EKG ROUTINE W/ 12 LEADS, INTER & REP [71953 CPT(R)] Follow-up Instructions Return in about 6 months (around 3/11/2018), or if symptoms worsen or fail to improve. Introducing [facility]! Johnon Malou introduces [employer] patient portal. Now you can access parts of your medical record, email your doctor's office, and request medication refills online. 1. In your internet browser, go to [email or web address] 2. Click on the First Time User? Click Here link in the Sign In box. You will see the New Member Sign Up page. 3. Enter your [employer] Access Code exactly as it appears below. You will not need to use this code after youve completed the sign-up process. If you do not sign up before the expiration date, you must request a new code. · [employer] Access Code: [email or web address] Expires: 12/10/2017 10:17 AM 
 
4. Enter the last four digits of your Social Security Number (xxxx) and Date of Birth (mm/dd/yyyy) as indicated and click Submit. You will be taken to the next sign-up page. 5. Create a [employer] ID. This will be your [employer] login ID and cannot be changed, so think of one that is secure and easy to remember. 6. Create a [employer] password. You can change your password at any time. 7. Enter your Password Reset Question and Answer. This can be used at a later time if you forget your password. 8. Enter your e-mail address. You will receive e-mail notification when new information is available in [address] 9. Click Sign Up. You can now view and download portions of your medical record. 10. Click the Download Summary menu link to download a portable copy of your medical information. If you have questions, please visit the Frequently Asked Questions section of the [employer] website. Remember, [email or web address] is NOT to be used for urgent needs. For medical emergencies, dial 911. Now available from your iPhone and Android! Please provide this summary of care documentation to your next provider. Your primary care clinician is listed as Dalton Minor. If you have any questions after today's visit, please call [phone].

## 2017-09-11 NOTE — PROGRESS NOTES
Review of Systems   Constitutional: Negative for chills, diaphoresis, fever, malaise/fatigue and weight loss. Respiratory: Negative. Cardiovascular: Positive for orthopnea and leg swelling. Negative for chest pain, palpitations and PND. Gastrointestinal: Negative. Musculoskeletal: Positive for back pain, falls, joint pain and myalgias. Negative for neck pain. Neurological: Positive for weakness.

## 2017-09-13 PROBLEM — Z95.0 BIVENTRICULAR CARDIAC PACEMAKER IN SITU: Status: ACTIVE | Noted: 2017-09-13

## 2017-09-13 NOTE — PROGRESS NOTES
I have personally seen and evaluated the device findings. Interrogation reviewed and I agree with assessment.     Charley Michele

## 2017-10-12 PROBLEM — N31.9 NEUROGENIC BLADDER: Status: ACTIVE | Noted: 2017-10-12

## 2017-12-04 ENCOUNTER — HOSPITAL ENCOUNTER (OUTPATIENT)
Dept: PHYSICAL THERAPY | Age: 67
Discharge: HOME | End: 2017-12-04
Payer: COMMERCIAL

## 2017-12-04 PROCEDURE — G8988 SELF CARE GOAL STATUS: HCPCS

## 2017-12-04 PROCEDURE — 97530 THERAPEUTIC ACTIVITIES: CPT

## 2017-12-04 PROCEDURE — 97163 PT EVAL HIGH COMPLEX 45 MIN: CPT

## 2017-12-04 PROCEDURE — G8987 SELF CARE CURRENT STATUS: HCPCS

## 2017-12-04 NOTE — PROGRESS NOTES
[facility]  [place] [employer] OF [address]  [phone] fax    Plan of Care/ Statement of Necessity for Physical Therapy Services    Patient name: Binh Gonzalez Start of Care: 2017   Referral source: Darshan TinocoDO : 1950    Medical Diagnosis: Paraplegia, unspecified [G82.20]  Infarction of spinal cord ([address] [G95.11]   Onset Date:2017    Treatment Diagnosis: Paraplegia s/p spinal cord injury    Prior Hospitalization: see medical history Provider#: [id number]   Medications: Verified on Patient summary List    Comorbidities: heart disease, pacemaker, DM, arthritis, HTN, CVA (2017)   Prior Level of Function: Ind with ambulation (RW when her L knee was hurting), lives with , Ind with ADLs and housework. The Plan of Care and following information is based on the information from the initial evaluation. Assessment/ key information: Pt is a 80 yo F who reports that she was using her RW to get to the bathroom in 2017 when her L knee buckled and she fell. She had a psoas hematoma and then a psaos abscess that resulted in a spinal cord infarct. She has had inpatient therapy for one month after spinal cord injury and then home health since before presenting to outpatient therapy. She uses a sliding board for chair<>table and chair<>car transfers. Her  assists with bed mobility, transfers, and ADLs. She reports spasms/tone in BLE and is taking Baclofen. Sensation to light touch is absent BLE. She demonstrates significant strength deficits of BLE (measured in sitting) ranging from 0/5 to 3+/5. Pt is Marino with chair<>table transfer with sliding board. She requires modA for BLE management with sit<>supine transfer and maxA with standing in parallel bars with significant UE use. Pt is independent with manual wheelchair negotiation but reports R shoulder pain from propelling wheelchair.   Pt is scheduled to receive a power chair next week. Pt will benefit from skilled PT to address ROM, strength, and functional mobility deficits to improve independence, reduce caregiver burden, and improve QOL. Evaluation Complexity History HIGH Complexity :3+ comorbidities / personal factors will impact the outcome/ POC ; Examination HIGH Complexity : 4+ Standardized tests and measures addressing body structure, function, activity limitation and / or participation in recreation  ;Presentation HIGH Complexity : Unstable and unpredictable characteristics  ; Clinical Decision Making HIGH Complexity : FOTO score of 1- 25 - based on clinical judgment   Overall Complexity Rating: HIGH   Problem List: pain affecting function, decrease ROM, decrease strength, edema affecting function, impaired gait/ balance, decrease ADL/ functional abilitiies, decrease activity tolerance, decrease flexibility/ joint mobility and decrease transfer abilities   Treatment Plan may include any combination of the following: Therapeutic exercise, Therapeutic activities, Neuromuscular re-education, Physical agent/modality, Gait/balance training, Manual therapy, Patient education, Self Care training, Functional mobility training, Home safety training and Stair training  Patient / Family readiness to learn indicated by: asking questions, trying to perform skills and interest  Persons(s) to be included in education: patient (P) and family support person (FSP);list   Barriers to Learning/Limitations: None  Patient Goal (s): to be able to stand and walk  Patient Self Reported Health Status: fair  Rehabilitation Potential: fair    Short Term Goals: To be accomplished in 1 weeks:  1. Pt will be compliant with initial HEP to improve therapy outcomes   Long Term Goals: To be accomplished in 6 weeks:  1. Pt will improve FOTO by 5 points in order to demonstrate functional improvement   2.  Pt will perform sit<>supine transfer with Shon in order to improve independence with functional mobility at home  3. Pt will performing rolling with Shon in order to improve independence with functional mobility at home  4. Pt will perform sit to stand with modA in order to reduce caregiver burden with ADLs     Frequency / Duration: Patient to be seen 2-3 times per week for 6 weeks. Patient/ Caregiver education and instruction: Diagnosis, prognosis, exercises   [x]  Plan of care has been reviewed with PTA    G-Codes (GP)  Self Care   Current  CL= 60-79%   Goal  CK= 40-59%    The severity rating is based on clinical judgment. Certification Period: 12/4/17 to 3/4/18  Blaise Méndez, PT 12/4/2017 11:08 AM    ________________________________________________________________________    I certify that the above Therapy Services are being furnished while the patient is under my care. I agree with the treatment plan and certify that this therapy is necessary.     Physician's Signature:____________________  Date:____________Time: _________    Please sign and return to [facility]  [address]  [phone] fax

## 2017-12-04 NOTE — PROGRESS NOTES
PT DAILY TREATMENT NOTE/NEURO EVAL 3-16    Patient Name: Louie Kirkland  Date:2017  : 1950  [x]  Patient  Verified  Payor: [employer] [place] / Plan: [address] [place] [address] / Product Type: PPO /    In time:11:15  Out time:12:00  Total Treatment Time (min): 45  Total Timed Codes (min):10  1:1 Treatment Time ( only): 39  Visit #: 1 of     Treatment Area: Paraplegia, unspecified [G82.20]  Infarction of spinal cord (HCC) [G95.11]    SUBJECTIVE  Pain Level (0-10 scale): 810 BLE   []constant []intermittent []improving []worsening []no change since onset    Any medication changes, allergies to medications, adverse drug reactions, diagnosis change, or new procedure performed?: [x] No    [] Yes (see summary sheet for update)  Subjective functional status/changes:       Pt reports that she was using her RW to get to the bathroom in 2017. She had a psoas hematoma and then a psaos abscess that resulted in a spinal cord infarct. She has had inpatient therapy for one month after spinal cord injury and then home health since before presenting to outpatient. She uses a slide board for chair<>table and chair<>car transfers. Her  assists with transfers and ADLs.       Barriers: []pain []financial []time []transportation []other  Motivation: high  Substance use: []Alcohol []Tobacco []other:   FABQ Score: []low []elevate  Cognition: A & O x 4    Other:    OBJECTIVE/EXAMINATION      35 min [x]Eval                  []Re-Eval       10 min Therapeutic Activity:  []  See flow sheet :   Rationale: Educated patient regarding findings of evaluation, signs/sx of DVT and call MD/go to ER is signs/sx, findings of evaluation, new therex technique and purpose, verbal review of HEP, purpose of therapy, and therapy plan in order to ensure pt understanding/compliance with therapy              With   [] TE   [] TA   [] neuro   [] other: Patient Education: [x] Review HEP    [] Progressed/Changed HEP based on:   [] positioning   [] body mechanics   [] transfers   [] heat/ice application    [] other:      Other Objective/Functional Measures:     /80 taken manually prior to therapy     Physical Therapy Evaluation  Neurologic    Posture: upright posture in manual wheelchair     Gait: unable    ROM:  decreased AROM knee flexion/DF/PF - not formally assessed    Strength (MMT):    Hip L (1-5) R (1-5)   Hip Flexion 2- 2-   Hip Ext     Hip ABD     Hip ADD     Hip ER 2+ 2+   Hip IR 0 2-     Knee L (1-5) R (1-5)   Knee Flexion 1 2-   Knee Extension 3+ 3   Ankle PF 0 0   Ankle DF 0 2-   Other       MMT was performed in sitting d/t time constraint     Sensation: no sensation to light touch BLE     Reflexes: [] Not Tested   Left Right   Biceps (C5)     Brachioradiais (C6)     Triceps (C7)     Knee Jerk (L4) 1+ 1+   Ankle Jerk (SI) 0 0     Balance/ Equilibrium:             Sitting Balance: Static:  [x] Good    [] Fair    [] Poor     Dynamic:   [x] Good    [] Fair    [] Poor        Standing Balance: Static:   [] Good    [] Fair    [x] Poor     Dynamic:  Unable     Functional Mobility      Bed Mobility:      Scooting:        Rolling:       Sit-Supine: modA - required therapist to manage BLE       Transfers:   Chair-Table: Marino, requiring extra time        Sit-Stand: maxA with parallel bars        Floor-Stand:       Gait: unable            Behavior: [x] Cooperative    [] Impulsive    [] Agitated    [] Perseverative    [] Confused   Oriented x: 4    Other:       Impaired Judgement: [] Y    [x] N      Impaired Vision:  [] Y    [x] N      Safety Awareness Deficits  [x] Y    [] N      Impaired Hearing  [] Y    [x] N      Able to Express Needs [x] Y    [] N      Other test /comments:    L knee pain when knee was blocked by therapist for standing in parallel bars   Significant UE use with parallel bars for pulling into standing and maintaining standing position   Pt was fatigued and increased BLE pain following evaluation, held performing HEP d/t pain/fatigue   Increased pain was decreasing with rest     Tone not assessed d/t time constraint      Pain Level (0-10 scale) post treatment: 9/10    ASSESSMENT/Changes in Function: See POC    Patient will continue to benefit from skilled PT services to modify and progress therapeutic interventions, address functional mobility deficits, address ROM deficits, address strength deficits, analyze and address soft tissue restrictions, analyze and cue movement patterns, assess and modify postural abnormalities, address imbalance/dizziness and instruct in home and community integration to attain remaining goals.      [x]  See Plan of Care  []  See progress note/recertification  []  See Discharge Summary         Progress towards goals / Updated goals:  See POC    PLAN  [x]  Upgrade activities as tolerated     []  Continue plan of care  [x]  Update interventions per flow sheet       []  Discharge due to:_  []  Other:_      Yaniv Calderon PT 12/4/2017  11:06 AM

## 2017-12-08 ENCOUNTER — HOSPITAL ENCOUNTER (OUTPATIENT)
Dept: PHYSICAL THERAPY | Age: 67
End: 2017-12-08
Payer: COMMERCIAL

## 2017-12-11 ENCOUNTER — APPOINTMENT (OUTPATIENT)
Dept: PHYSICAL THERAPY | Age: 67
End: 2017-12-11
Payer: COMMERCIAL

## 2017-12-13 ENCOUNTER — APPOINTMENT (OUTPATIENT)
Dept: PHYSICAL THERAPY | Age: 67
End: 2017-12-13
Payer: COMMERCIAL

## 2017-12-14 ENCOUNTER — CLINICAL SUPPORT (OUTPATIENT)
Dept: CARDIOLOGY CLINIC | Age: 67
End: 2017-12-14

## 2017-12-14 DIAGNOSIS — Z95.0 BIVENTRICULAR CARDIAC PACEMAKER IN SITU: ICD-10-CM

## 2017-12-14 DIAGNOSIS — I42.8 NONISCHEMIC CARDIOMYOPATHY (HCC): Primary | Chronic | ICD-10-CM

## 2017-12-15 ENCOUNTER — APPOINTMENT (OUTPATIENT)
Dept: PHYSICAL THERAPY | Age: 67
End: 2017-12-15
Payer: COMMERCIAL

## 2017-12-18 ENCOUNTER — APPOINTMENT (OUTPATIENT)
Dept: PHYSICAL THERAPY | Age: 67
End: 2017-12-18
Payer: COMMERCIAL

## 2017-12-20 ENCOUNTER — APPOINTMENT (OUTPATIENT)
Dept: PHYSICAL THERAPY | Age: 67
End: 2017-12-20
Payer: COMMERCIAL

## 2017-12-22 ENCOUNTER — APPOINTMENT (OUTPATIENT)
Dept: PHYSICAL THERAPY | Age: 67
End: 2017-12-22
Payer: COMMERCIAL

## 2017-12-27 ENCOUNTER — APPOINTMENT (OUTPATIENT)
Dept: PHYSICAL THERAPY | Age: 67
End: 2017-12-27
Payer: COMMERCIAL

## 2017-12-29 ENCOUNTER — APPOINTMENT (OUTPATIENT)
Dept: PHYSICAL THERAPY | Age: 67
End: 2017-12-29
Payer: COMMERCIAL

## 2018-02-28 NOTE — PROGRESS NOTES
Progress Note    Patient: Pooja Benton MRN: [id number]  SSN: [id number]    YOB: 1950  Age: 77 y.o.   Sex: female      Admit Date: 4/20/2017    LOS: 15 days     Subjective:     NO  FLANK OR ABD PAIN  NO  N/V  TEMP DOWN    REPEAT CT SCAN NOTED    Objective:     Vitals:    05/05/17 0015 05/05/17 0106 05/05/17 0400 05/05/17 0554   BP: 93/52 98/58 108/61    Pulse:  71 73    Resp:  18 18    Temp:  99.1 °F (37.3 °C) 98 °F (36.7 °C)    TempSrc:       SpO2:  96% 94%    Weight:    250 lb 8 oz (113.6 kg)   Height:            Intake and Output:  Current Shift:    Last three shifts: 05/03 1901 - 05/05 0700  In: 830 [P.O.:480; I.V.:350]  Out: 1725 [Urine:1725]    Current Facility-Administered Medications   Medication Dose Route Frequency    0.9% sodium chloride infusion 250 mL  250 mL IntraVENous PRN    famotidine (PEPCID) tablet 20 mg  20 mg Oral DAILY    0.9% sodium chloride infusion  25 mL/hr IntraVENous CONTINUOUS    insulin lispro (HUMALOG) injection   SubCUTAneous AC&HS    0.9% sodium chloride infusion 250 mL  250 mL IntraVENous PRN    cefTRIAXone (ROCEPHIN) 2 g in 0.9% sodium chloride (MBP/ADV) 50 mL MBP  2 g IntraVENous Q24H    insulin glargine (LANTUS) injection 20 Units  20 Units SubCUTAneous DAILY    diphenhydrAMINE (BENADRYL) capsule 50 mg  50 mg Oral Q4H PRN    polyethylene glycol (MIRALAX) packet 17 g  17 g Oral TID    cholecalciferol (VITAMIN D3) tablet 2,000 Units  2,000 Units Oral DAILY    docusate sodium (COLACE) capsule 100 mg  100 mg Oral DAILY AFTER BREAKFAST    gabapentin (NEURONTIN) capsule 400 mg  400 mg Oral BID    oxyCODONE-acetaminophen (PERCOCET 7.5) 7.5-325 mg per tablet 1 Tab  1 Tab Oral Q4H PRN    pravastatin (PRAVACHOL) tablet 40 mg  40 mg Oral QHS    senna-docusate (PERICOLACE) 8.6-50 mg per tablet 2 Tab  2 Tab Oral PCD    acetaminophen (TYLENOL) tablet 500 mg  500 mg Oral Q6H PRN    allopurinol (ZYLOPRIM) tablet 100 mg  100 mg Oral DAILY    clotrimazole [facility]) frances 10 mg  10 mg Oral 5XD    folic acid (FOLVITE) tablet 1 mg  1 mg Oral DAILY    ondansetron (ZOFRAN) injection 4 mg  4 mg IntraVENous Q8H PRN    glucose chewable tablet 16 g  16 g Oral PRN    glucagon (GLUCAGEN) injection 1 mg  1 mg IntraMUSCular PRN    dextrose (D50W) injection syrg 12.5-25 g  25-50 mL IntraVENous PRN         Physical Exam:   GENERAL: alert, cooperative, no distress, appears stated age  ABDOMEN: soft, non-tender. Bowel sounds normal. No masses,  no organomegaly  FLANK: NON      Lab/Data Review:  BMP:   Lab Results   Component Value Date/Time     05/05/2017 04:11 AM    K 3.7 05/05/2017 04:11 AM     05/05/2017 04:11 AM    CO2 25 05/05/2017 04:11 AM    AGAP 7 05/05/2017 04:11 AM    GLU 79 05/05/2017 04:11 AM    BUN 31 (H) 05/05/2017 04:11 AM    CREA 2.80 (H) 05/05/2017 04:11 AM    GFRAA 20 (L) 05/05/2017 04:11 AM    GFRNA 17 (L) 05/05/2017 04:11 AM     CMP:   Lab Results   Component Value Date/Time     05/05/2017 04:11 AM    K 3.7 05/05/2017 04:11 AM     05/05/2017 04:11 AM    CO2 25 05/05/2017 04:11 AM    AGAP 7 05/05/2017 04:11 AM    GLU 79 05/05/2017 04:11 AM    BUN 31 (H) 05/05/2017 04:11 AM    CREA 2.80 (H) 05/05/2017 04:11 AM    GFRAA 20 (L) 05/05/2017 04:11 AM    GFRNA 17 (L) 05/05/2017 04:11 AM    CA 8.1 (L) 05/05/2017 04:11 AM     CBC:   Lab Results   Component Value Date/Time    WBC 5.7 05/05/2017 04:11 AM    HGB 6.5 (L) 05/05/2017 04:11 AM    HCT 19.7 (L) 05/05/2017 04:11 AM     05/05/2017 04:11 AM          CT Results:    Results from Hospital Encounter encounter on 04/20/17   CT ABD PELV WO CONT   Narrative CT Abdomen And Pelvis without Intravenous Contrast    INDICATION: Generalized abdominal pain. Follow-up psoas hematoma/abscess. Now  with dropping hemoglobin.     TECHNIQUE: 5 mm collimation axial images obtained from the diaphragm to the  level of the pubic symphysis without administration of low osmolar, nonionic  intravenous contrast.    Dose reduction techniques used: Automated exposure control, adjustment of the  mAs and/or kVp according to patient size, standardized low-dose protocol, and/or  iterative reconstruction technique. COMPARISON: April 26, 2017. ABDOMEN FINDINGS:    Lung Bases: There are small bilateral pleural effusions, left greater than  right. These are similar in size to prior. There is associated atelectasis. Heart is mildly enlarged in size. Liver: Normal attenuation. Posterior subcapsular calcifications are unchanged. No definite new mass. Gallbladder: Status post cholecystectomy. No biliary ductal dilatation. Pancreas: The pancreas is unchanged. Spleen: The spleen is mildly enlarged in size. Stable calcification band in the  posterior aspect. The spleen measures 14.7 cm. Adrenal Glands: No evidence for mass. Kidneys:     Right: Mild cortical thinning. Hypodensity cortical lesion is stable. No  hydronephrosis. Left:  Nonspecific bilateral perinephric stranding. Exophytic lower pole cyst  is stable. No hydronephrosis. Possible nonobstructive renal calculus versus  vascular calcification. Lymph Nodes: No lymphadenopathy. Aorta:   Normal in caliber. Scattered atherosclerotic disease of aorta. Percutaneous pigtail drainage catheter in the right psoas muscle is unchanged in  position. The right psoas muscle remains enlarged. At the level of the pigtail  catheter, the psoas muscle measures 4.3 cm x 4.0 cm, previously 4.4 cm x 3.9 cm  by similar measurements. More inferiorly, there is increasing low density  measuring approximately 2.1 cm x 2.2 cm in the right psoas muscle. This area  measures complex density. Heterogeneity in the right psoas muscle anterior to the right hip is seen, but  not definitively changed from prior    PELVIS FINDINGS:     Bowel: Small Bowel: Normal in caliber with normal wall thickness. Large Bowel:  Moderate stool burden without evidence of obstruction or  inflammation. Appendix: Normal appendix. Bladder: Collapsed with Krueger catheter in place. The uterus is surgically absent. No suspicious adnexal mass. Stable calcified  left adnexal mass. There is slightly increased confluent presacral edema which measures mildly  complex density. There is diffuse anasarca. Bones: There are degenerative changes of the spine. There is no suspicious  osseous lesion. .         Impression IMPRESSION:    Increasing focal area within the right psoas muscle inferior to the previously  drained area. This measures mildly complex density and could represent either  spread of infection inferiorly or intramuscular hematoma. Differentiation is not  possible without intravenous contrast.    Mild heterogeneity in the right psoas muscle anterior to the right hip is seen  but incompletely assessed without intravenous contrast.    Slightly increasing presacral edema/fluid, some of which measures mildly complex  density. A component of blood products cannot be excluded. Ongoing diffuse anasarca with subcutaneous edema and small pleural effusions. Extensive bibasilar atelectasis with or without infiltrates. Remainder is stable in the short interval. Please see above for complete  details. US Results:    Results from [place] encounter on 04/20/17   US ABD LTD   Narrative ULTRASOUND ABDOMEN-LIMITED    INDICATION: Evaluation for fluid collection/abscess at the site of pacemaker  pocket    COMPARISON: Correlation with CT abdomen/pelvis 4/19/2017    TECHNIQUE: Ultrasound evaluation was performed of the left upper quadrant  abdomen in the area of pacemaker. Selected static images are submitted for  review. FINDINGS:  In the area of concern, there is no evidence for focal fluid collection/abscess. Impression IMPRESSION:  As above.               CT FROM  5/4:  IMPRESSION  IMPRESSION:     Increasing focal area within the right psoas muscle inferior to the previously  drained area. This measures mildly complex density and could represent either  spread of infection inferiorly or intramuscular hematoma. Differentiation is not  possible without intravenous contrast.     Mild heterogeneity in the right psoas muscle anterior to the right hip is seen  but incompletely assessed without intravenous contrast.     Slightly increasing presacral edema/fluid, some of which measures mildly complex  density. A component of blood products cannot be excluded.     Ongoing diffuse anasarca with subcutaneous edema and small pleural effusions. Extensive bibasilar atelectasis with or without infiltrates.     Remainder is stable in the short interval. 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/2012)      Overview: S/p explant 4 leads 8/20/12      Benign hypertensive heart disease with systolic CHF, NYHA class 2 ([address] (9/5/2012)      Decreased calculated glomerular filtration rate (GFR) (3/30/2017)      Overview: Calculated GFR equivalent to that of CKD stage 3 = 30-59 ml/min      Iliopsoas muscle hematoma (3/30/2017)      Psoas hematoma, right, secondary to anticoagulant therapy (3/30/2017)      Impaired mobility and ADLs (3/30/2017)      History of Coumadin therapy ()      Overview: Anticoagulation for chronic atrial fibrillation; Discontinued on 3/30/2017      Sepsis ([address] (4/20/2017)      Psoas abscess, right ([address] (4/20/2017)      Krueger catheter in place on admission (4/20/2017)      Urinary tract infection due to Enterococcus (4/20/2017)      Group B streptococcal infection (4/20/2017)        Plan:     CONTINUING  ANTIBIOTICS. ..  IF NO CLINICAL  RESPONSE,  MAY NEED TO CONSIDER    REPEAT PERC DRAINAGE         Signed By: Sue Cleaning MD , FACS    May 5, 2017 PAGER:  [phone]  CELL:  19 Analy Anne:  [address] [facility]   [phone] normal...

## 2018-03-12 ENCOUNTER — CLINICAL SUPPORT (OUTPATIENT)
Dept: CARDIOLOGY CLINIC | Age: 68
End: 2018-03-12

## 2018-03-12 ENCOUNTER — OFFICE VISIT (OUTPATIENT)
Dept: CARDIOLOGY CLINIC | Age: 68
End: 2018-03-12

## 2018-03-12 VITALS
BODY MASS INDEX: 35.16 KG/M2 | DIASTOLIC BLOOD PRESSURE: 68 MMHG | HEART RATE: 71 BPM | WEIGHT: 232 LBS | HEIGHT: 68 IN | SYSTOLIC BLOOD PRESSURE: 124 MMHG | OXYGEN SATURATION: 97 %

## 2018-03-12 DIAGNOSIS — Z95.0 PACEMAKER: ICD-10-CM

## 2018-03-12 DIAGNOSIS — I50.22 CHRONIC SYSTOLIC HEART FAILURE (HCC): Primary | Chronic | ICD-10-CM

## 2018-03-12 DIAGNOSIS — Z95.0 BIVENTRICULAR CARDIAC PACEMAKER IN SITU: ICD-10-CM

## 2018-03-12 DIAGNOSIS — I42.8 NONISCHEMIC CARDIOMYOPATHY (HCC): Chronic | ICD-10-CM

## 2018-03-12 DIAGNOSIS — G82.20 PARAPLEGIA (HCC): ICD-10-CM

## 2018-03-12 DIAGNOSIS — E78.5 DYSLIPIDEMIA: ICD-10-CM

## 2018-03-12 PROBLEM — E11.21 TYPE 2 DIABETES WITH NEPHROPATHY (HCC): Status: ACTIVE | Noted: 2018-03-12

## 2018-03-12 NOTE — PROGRESS NOTES
Review of Systems   Constitutional: Negative for chills, fever, malaise/fatigue and weight loss. Respiratory: Negative for cough, hemoptysis and shortness of breath. Cardiovascular: Negative for chest pain, palpitations and leg swelling. Gastrointestinal: Negative. Musculoskeletal: Negative for falls, joint pain and myalgias. Neurological: Negative for dizziness.

## 2018-03-12 NOTE — MR AVS SNAPSHOT
[address] [address] [address] [address] [phone] Patient: Ivonne Marcus MRN: [id number] Visit Information Date & Time Provider Department Dept. Phone Encounter #  
 3/12/2018  2:40 PM [facility] [address] [id number] Follow-up Instructions Return in about 6 months (around 2018). 2018 11:00 AM  
Any with Kylee Clayton MD  
[facility] Appt Note: EP- Urinary Retention, 6 month f/u  
 709 [facility] [address] [place] [address]  
  
   
 [address] [address] Upcoming Health Maintenance Date Due Hepatitis C Screening 1950 FOOT EXAM Q1 1960 MICROALBUMIN Q1 1960 EYE EXAM RETINAL OR DILATED Q1 1960 DTaP/Tdap/Td series (1 - Tdap) 1971 ZOSTER VACCINE AGE 60> 2010 BREAST CANCER SCRN MAMMOGRAM 2014 GLAUCOMA SCREENING Q2Y 2015 Bone Densitometry (Dexa) Screening 2015 Pneumococcal 65+ Low/Medium Risk (1 of 2 - PCV13) 2015 MEDICARE YEARLY EXAM 2015 LIPID PANEL Q1 2015 Influenza Age 5 to Adult 2017 HEMOGLOBIN A1C Q6M 2017 FOBT Q 1 YEAR AGE 50-75 6/15/2018 Allergies as of 3/12/2018  Review Complete On: 3/12/2018 By: Tushar James DO Severity Noted Reaction Type Reactions Vancomycin High 2012   Side Effect Itching Ampicillin  2010    Itching Bactrim [Sulfamethoxazole-trimethoprim]  2010    Unknown (comments) Blueberry  2017    Swelling Causes throat swelling Ciprofloxacin  2010    Itching Codeine  2010    Other (comments) Jumpy feeling Crestor [Rosuvastatin]  2010    Itching Darvocet A500 [Propoxyphene N-acetaminophen]  2010    Itching Demerol [Meperidine]  06/11/2010    Itching Levaquin [Levofloxacin]  05/20/2014    Itching Lipitor [Atorvastatin]  06/11/2010    Myalgia Magnesium Oxide  06/28/2013    Itching  
 nausea Minocin [Minocycline]  06/11/2010    Unknown (comments) Pcn [Penicillins]  06/11/2010    Itching Pravachol [Pravastatin]  06/11/2010    Swelling Swelling in mouth Shellfish Derived  06/07/2017    Unknown (comments) Sulfa (Sulfonamide Antibiotics)  06/11/2010    Itching Ultracet [Tramadol-acetaminophen]  06/11/2010    Itching Vicodin [Hydrocodone-acetaminophen]  06/11/2010    Unknown (comments) Vytorin 10-10 [Ezetimibe-simvastatin]  06/11/2010    Myalgia Percodan [Oxycodone Hcl-oxycodone-asa] Low 06/11/2010   Side Effect Itching Current Immunizations  Reviewed on 5/2/2017 No immunizations on file. Not reviewed this visit You Were Diagnosed With   
  
 Codes Comments Chronic systolic heart failure (HCC)    -  Primary ICD-10-CM: A81.94 ICD-9-CM: 428.22 Nonischemic cardiomyopathy ([facility] [address]     ICD-10-CM: I42.8 ICD-9-CM: 425.4 Pacemaker twiddler's syndrome, initial encounter     ICD-10-CM: T82.198A ICD-9-CM: 996.01 Dyslipidemia     ICD-10-CM: E78.5 ICD-9-CM: 272.4 Vitals BP Pulse Height(growth percentile) Weight(growth percentile) SpO2 BMI  
 124/68 71 5' 8\" (1.727 m) 232 lb (105.2 kg) 97% 35.28 kg/m2 OB Status Smoking Status Hysterectomy Never Smoker Vitals History BMI and BSA Data Body Mass Index Body Surface Area  
 35.28 kg/m 2 2.25 m 2 Preferred Pharmacy Pharmacy Name Phone [address], [address] [place] [address] [phone] Your Updated Medication List  
  
   
This list is accurate as of 3/12/18  4:08 PM.  Always use your most recent med list.  
  
  
  
  
 acetaminophen 325 mg tablet Commonly known as:  TYLENOL  
 Take 2 Tabs by mouth every four (4) hours as needed (for fever or pain level less than 5/10). Indications: Fever, Pain  
  
 baclofen 10 mg tablet Commonly known as:  LIORESAL  
  
 cholecalciferol 1,000 unit tablet Commonly known as:  VITAMIN D3 Take 2 Tabs by mouth daily. Indications: PREVENTION OF VITAMIN D DEFICIENCY  
  
 famotidine 20 mg tablet Commonly known as:  PEPCID Take 1 Tab by mouth nightly. ferrous sulfate 325 mg (65 mg iron) tablet Take 1 Tab by mouth two (2) times daily (with meals). folic acid 1 mg tablet Commonly known as:  Google Take 1 Tab by mouth daily. furosemide 40 mg tablet Commonly known as:  LASIX  
1 tab daily  Indications: Edema * gabapentin 300 mg capsule Commonly known as:  NEURONTIN Take 2 Caps by mouth two (2) times a day. Indications: NEUROPATHIC PAIN  
  
 * gabapentin 800 mg tablet Commonly known as:  NEURONTIN  
  
 insulin glargine 100 unit/mL injection Commonly known as:  LANTUS U-100 INSULIN  
5 Units by SubCUTAneous route nightly. Indications: type 2 diabetes mellitus * insulin lispro 100 unit/mL injection Commonly known as:  HUMALOG See sliding scale * HumaLOG KwikPen Insulin 100 unit/mL kwikpen Generic drug:  insulin lispro JANUVIA 50 mg tablet Generic drug:  SITagliptin Take 50 mg by mouth daily. Lactobacillus Acidoph & Bulgar 1 million cell Tab tablet Commonly known as:  Jaclyn Standing Take 1 Tab by mouth two (2) times a day. magnesium oxide 400 mg tablet Commonly known as:  MAG-OX Take 1 Tab by mouth two (2) times a day. potassium chloride 20 mEq tablet Commonly known as:  K-DUR, KLOR-CON Take 1 Tab by mouth two (2) times a day. pravastatin 40 mg tablet Commonly known as:  PRAVACHOL Take 40 mg by mouth nightly. Indications: DYSLIPIDEMIA * Notice:   This list has 4 medication(s) that are the same as other medications prescribed for you. Read the directions carefully, and ask your doctor or other care provider to review them with you. We Performed the Following AMB POC EKG ROUTINE W/ 12 LEADS, INTER & REP [93978 CPT(R)] Follow-up Instructions Return in about 6 months (around 9/12/2018). To-Do List   
 03/12/2018 Lab:  HEPATIC FUNCTION PANEL   
  
 03/12/2018 Lab:  LIPID PANEL Please provide this summary of care documentation to your next provider. Your primary care clinician is listed as Dallin Cleveland. If you have any questions after today's visit, please call [phone].

## 2018-03-12 NOTE — PROGRESS NOTES
1. Have you been to the ER, urgent care clinic since your last visit? Hospitalized since your last visit?no    2. Have you seen or consulted any other health care providers outside of the [address] [place] [address] since your last visit? Include any pap smears or colon screening.  no

## 2018-03-12 NOTE — PROGRESS NOTES
HPI:  I saw Radha Heart in my office today in cardiovascular evaluation regarding her dilated nonischemic cardiomyopathy. Ms. Luís Heart is a very pleasant 79 year old fair skinned  female with history of a dilated cardiomyopathy with mild left ventricular dysfunction with an ejection fraction of 40-45% range with widely patent coronary arteries on a cardiac catheterization back in August of 1996. Her very last echocardiogram in June of 2015 showed that her ejection fraction was still very mildly decreased at 45-50%. She did have a biventricular AICD placed, but due to trauma and infection in that area that had to be removed and now she has a biventricular pacemaker with a pulse generator residing in her left upper quadrant, placed back in October of 2012.      Unfortunately, she fell at home and traumatized her back developed an infected right psoas hematoma with development of an epidural abscess with neurologic compromise resulting in paraplegia back in April 2017. She comes into the office today and relates that she is doing reasonably well. She unfortunately developed an ulcer on the bottom of her right foot for which she is wearing an Unna boot and apparently dressings daily. She is not having any chest pain, shortness of breath or any other cardiovascular complaints at this time. Encounter Diagnoses   Name Primary?  Chronic systolic heart failure (HCC) Yes    Nonischemic cardiomyopathy ([facility] [address]     Dyslipidemia     Pacemaker     Paraplegia [facility])        Discussion: This patient unfortunately has paraplegia and is wheelchair bound, but from a cardiovascular vantage she is remaining fairly stable without any signs of overt heart failure and her pacemaker appears to be working appropriately as documented with a pacemaker check today, although there is only 9 months left on the battery.     She has had patent pacemaker is in different locations and has had problems with infection with pacemakers in the past as indicated above so I am going to have her come in to decrease to see Dr. Janeth Thapa when she has a month or 2 prior to the pacemaker change so that he can assess the best way to access her pacing side and minimize the likelihood of any potential complications. Her latest lipid profile is not available to me currently and we are going to check the records but if it has not been done we will get it completed. In the past she has been on pravastatin but that medication was stopped due to side effects and she has had side effects from all statin drugs including Vytorin although she has never been on Zetia alone so that might be a consideration if her cholesterol is quite high. Her blood pressure is well-controlled today and she is otherwise doing well from my vantage without any signs of overt heart failure so were going to leave her on her current medical regimen and I will see her again in several months. PCP: Loren Lacy DO        Past Medical History:   Diagnosis Date    Acute paraplegia ([facility] [address] 4/20/2017    Benign hypertensive heart disease with systolic CHF, NYHA class 2 ([address] 9/5/2012    Biventricular implantable cardioverter-defibrillator in situ 04/28/2005    Upgraded to BiV AICD; gen change 4/2008; pocket revision 10/2009; Abdominal - done on 8/22/2012 by Dr. Yesica Jackson Cardiac cath 08/15/1996    Patent coronaries. Elev LVEDP. EF 50-55%.  Cardiac echocardiogram 06/23/2015    Ltd study. EF 45-50%. Mild, diffuse hypk. Severe apical hypk. No mass or thrombus was clearly identified, although imaging was suboptimal.      Cardiac nuclear imaging test 06/19/2015    Fixed distal apical, distal septal defect more likely due to RV pacing than prior infarct. No ischemia. EF 46%. RWMA c/w RV pacing. Nondiagnostic EKG on pharm stress test.      Cardiovascular lower extremity venous duplex 09/04/2012    Acute, non-occlusive DVT in CFV on right. No DVT on left. No superficial thrombosis bilaterally.  Cardiovascular upper extremity venous duplex 08/27/2012    DVT in axillary vein on left. Left subclavian was not visualized.  Chronic anemia 9/5/2012    Chronic systolic heart failure (HCC)     Decreased calculated glomerular filtration rate (GFR) 3/30/2017    Calculated GFR equivalent to that of CKD stage 3 = 30-59 ml/min    Diabetic neuropathy associated with type 2 diabetes mellitus ([address] 6/28/2011    Difficult airway for intubation 08/22/2012    see anesthesia airway note    Dyslipidemia 6/28/2011    Gout     History of complete heart block 6/28/2011    History of Coumadin therapy     Anticoagulation for DVT of the LUE; Discontinued on 3/30/2017    History of deep venous thrombosis 9/5/2012    Left upper extremity    History of pyelonephritis 3/30/2017    Left bundle branch block (LBBB) on electrocardiogram 6/28/2011    Nonischemic cardiomyopathy ([address] 6/28/2011    Obesity (BMI 35.0-39.9 without comorbidity) 3/13/2017    Obstructive sleep apnea on CPAP 2/7/2012    Psoas abscess, right ([address] 4/20/2017    Psoas hematoma, right, secondary to anticoagulant therapy 3/30/2017    Type 2 diabetes mellitus with diabetic neuropathy ([address] 6/28/2011         Past Surgical History:   Procedure Laterality Date    HX CARPAL TUNNEL RELEASE  4/07    right     HX CHOLECYSTECTOMY  1994    HX HYSTERECTOMY  1973    HX OTHER SURGICAL  6/11/2012    AICD revision    HX PACEMAKER  4/28/2005    [employer] AICD         Current Outpatient Rx   Name  Route  Sig  Dispense  Refill    pravastatin (PRAVACHOL) 40 mg tablet    Oral    Take 40 mg by mouth nightly.  cyclobenzaprine (FLEXERIL) 10 mg tablet    Oral    Take 10 mg by mouth as needed.  warfarin (COUMADIN) 2 mg tablet        Take 3 tablets by mouth daily or as directed by our office. 90 Tab    6      OTHER    Oral    Take 2 Tabs by mouth two (2) times a day.  NeuRx-TF - total formulation for peripheral nerve health              bumetanide (BUMEX) 1 mg tablet    Oral    Take 1 Tab by mouth two (2) times a day. 180 Tab    3      potassium chloride (KLOR-CON M20) 20 mEq tablet    Oral    Take 2 Tabs by mouth two (2) times a day. Or as directed    360 Tab    3      gabapentin (NEURONTIN) 300 mg tablet    Oral    Take 300 mg by mouth two (2) times daily as needed.  carvedilol (COREG) 25 mg tablet    Oral    Take 25 mg by mouth two (2) times a day.  insulin glargine (LANTUS) 100 unit/mL injection    SubCUTAneous    20 Units by SubCUTAneous route two (2) times a day.  allopurinol (ZYLOPRIM) 100 mg tablet    Oral    Take 100 mg by mouth two (2) times a day.  sitaGLIPtin (JANUVIA) 50 mg tablet    Oral    Take 1 Tab by mouth daily. 15 Tab    0      ferrous sulfate 325 mg (65 mg iron) tablet    Oral    Take 1 Tab by mouth two (2) times daily (with meals). 30 Tab    0      insulin aspart (NOVOLOG) 100 unit/mL injection        INITIATE INSULIN CORRECTIVE PROTOCOL (HR): Normal Insulin Sensitivity  For Blood Sugar (mg/dL) of:    Less than 150 =   0 units          150 -199 =   2 units 200 -249 =   4 units 250 -299 =   6 units 300 -349 =   8 units 350 and above =   10 units If 2 glucose readings are above 200 mg/dL    10 mL    6      Cholecalciferol, Vitamin D3, (VITAMIN D) 5,000 unit Tab    Oral    Take 1 Tab by mouth daily.                    Allergies   Allergen Reactions    Vancomycin Itching    Ampicillin Itching    Bactrim [Sulfamethoxazole-Trimethoprim] Unknown (comments)    Blueberry Swelling     Causes throat swelling    Ciprofloxacin Itching    Codeine Other (comments)     Jumpy feeling    Crestor [Rosuvastatin] Itching    Darvocet A500 [Propoxyphene N-Acetaminophen] Itching    Demerol [Meperidine] Itching    Levaquin [Levofloxacin] Itching    Lipitor [Atorvastatin] Myalgia    Magnesium Oxide Itching     nausea    Minocin [Minocycline] Unknown (comments)    Pcn [Penicillins] Itching    Pravachol [Pravastatin] Swelling     Swelling in mouth     Shellfish Derived Unknown (comments)    Sulfa (Sulfonamide Antibiotics) Itching    Ultracet [Tramadol-Acetaminophen] Itching    Vicodin [Hydrocodone-Acetaminophen] Unknown (comments)    Vytorin 10-10 [Ezetimibe-Simvastatin] Myalgia    Percodan [Oxycodone Hcl-Oxycodone-Asa] Itching         Social   Social History   Substance Use Topics    Smoking status: Never Smoker    Smokeless tobacco: Never Used    Alcohol use No         Family history: family history includes Cancer in her father. Review of Systems:     Constitutional: Negative for chills, fever, malaise/fatigue and weight loss. Respiratory: Negative for cough, hemoptysis and shortness of breath. Cardiovascular: Negative for chest pain, palpitations and leg swelling. Gastrointestinal: Negative. Musculoskeletal: Negative for falls, joint pain and myalgias. Neurological: Negative for dizziness. Physical Exam:   The patient is an alert, oriented, well developed, well nourished 79 y.o.  female who was in no acute distress at the time of my examination. Visit Vitals    /68    Pulse 71    Ht 5' 8\" (1.727 m)    Wt 105.2 kg (232 lb)    SpO2 97%    BMI 35.28 kg/m2      BP Readings from Last 3 Encounters:   03/12/18 124/68   10/27/17 145/67   10/12/17 156/84        Wt Readings from Last 3 Encounters:   03/12/18 105.2 kg (232 lb)   10/27/17 98 kg (216 lb)   10/12/17 98 kg (216 lb)       HEENT: Conjunctivae pink, sclera clear and white. Neck: Supple without masses or tenderness. There is mild thyromegaly. No jugular venous distention. Carotid upstrokes are full bilaterally, without bruits. Cardiovascular: Chest is symmetrical with good excursion. There  keloid over the incision in left upper chest in former pacemaker site. [place] is displaced between the MCL and AAL.   No lifts, heaves, or thrills felt on palpation. Normal S1 and S2 with a paradoxical splitting of the S2, with a grade II/VI DEE along the left sternal border, with radiation to the base without diastolic murmur. Lungs: Clear to auscultation in all fields. Abdomen: Protuberant and soft non tender. It was not adequately evaluated since the patient was in a wheelchair at the time of evaluation. Extremities: No edema with palpible peripheral pulses. Neurologic exam: was not completed but the patient is a paraplegic. Review of Data: Please refer to past medical history for most recent cardiac testing. Results for orders placed or performed in visit on 03/12/18   AMB POC EKG ROUTINE W/ 12 LEADS, INTER & REP     Status: None    Narrative    Normal sinus mechanism with atrial sensing and biventricular pacing with an overall heart rate of 71. There was one fusion beat. Tiffany Ruth D.O., F.A.C.C. [facility]  [facility]  [place] [address]. [address] [place]    PLEASE NOTE:  This document has been produced using voice recognition software. Unrecognized errors in transcription may be present.

## 2018-03-13 NOTE — PROGRESS NOTES
I have personally seen and evaluated the device findings. Interrogation reviewed and I agree with assessment.     Doni Dominguez

## 2018-04-04 ENCOUNTER — HOSPITAL ENCOUNTER (OUTPATIENT)
Dept: LAB | Age: 68
Discharge: HOME | End: 2018-04-04
Payer: COMMERCIAL

## 2018-04-04 DIAGNOSIS — E78.5 DYSLIPIDEMIA: ICD-10-CM

## 2018-04-04 LAB
ALBUMIN SERPL-MCNC: 3.3 G/DL (ref 3.4–5)
ALBUMIN/GLOB SERPL: 0.8 {RATIO} (ref 0.8–1.7)
ALP SERPL-CCNC: 103 U/L (ref 45–117)
ALT SERPL-CCNC: 25 U/L (ref 13–56)
AST SERPL-CCNC: 19 U/L (ref 15–37)
BILIRUB DIRECT SERPL-MCNC: 0.2 MG/DL (ref 0–0.2)
BILIRUB SERPL-MCNC: 0.9 MG/DL (ref 0.2–1)
CHOLEST SERPL-MCNC: 182 MG/DL
GLOBULIN SER CALC-MCNC: 4 G/DL (ref 2–4)
HDLC SERPL-MCNC: 64 MG/DL (ref 40–60)
HDLC SERPL: 2.8 {RATIO} (ref 0–5)
LDLC SERPL CALC-MCNC: 102.8 MG/DL (ref 0–100)
LIPID PROFILE,FLP: ABNORMAL
PROT SERPL-MCNC: 7.3 G/DL (ref 6.4–8.2)
TRIGL SERPL-MCNC: 76 MG/DL (ref ?–150)
VLDLC SERPL CALC-MCNC: 15.2 MG/DL

## 2018-04-04 PROCEDURE — 80061 LIPID PANEL: CPT | Performed by: INTERNAL MEDICINE

## 2018-04-04 PROCEDURE — 80076 HEPATIC FUNCTION PANEL: CPT | Performed by: INTERNAL MEDICINE

## 2018-04-04 PROCEDURE — 36415 COLL VENOUS BLD VENIPUNCTURE: CPT | Performed by: INTERNAL MEDICINE

## 2018-04-06 ENCOUNTER — HOSPITAL ENCOUNTER (OUTPATIENT)
Dept: GENERAL RADIOLOGY | Age: 68
Discharge: HOME | End: 2018-04-06
Payer: COMMERCIAL

## 2018-04-06 ENCOUNTER — HOSPITAL ENCOUNTER (OUTPATIENT)
Dept: LAB | Age: 68
Discharge: HOME | End: 2018-04-06
Payer: COMMERCIAL

## 2018-04-06 DIAGNOSIS — L89.619 PRESSURE ULCER OF RIGHT HEEL: ICD-10-CM

## 2018-04-06 DIAGNOSIS — S91.301S OPEN WOUND OF RIGHT FOOT, SEQUELA: ICD-10-CM

## 2018-04-06 LAB
ANION GAP SERPL CALC-SCNC: 8 MMOL/L (ref 3–18)
BASOPHILS # BLD: 0 K/UL (ref 0–0.1)
BASOPHILS NFR BLD: 0 % (ref 0–2)
BUN SERPL-MCNC: 22 MG/DL (ref 7–18)
BUN/CREAT SERPL: 17 (ref 12–20)
CALCIUM SERPL-MCNC: 9.9 MG/DL (ref 8.5–10.1)
CHLORIDE SERPL-SCNC: 101 MMOL/L (ref 100–108)
CO2 SERPL-SCNC: 30 MMOL/L (ref 21–32)
CREAT SERPL-MCNC: 1.33 MG/DL (ref 0.6–1.3)
DIFFERENTIAL METHOD BLD: NORMAL
EOSINOPHIL # BLD: 0.1 K/UL (ref 0–0.4)
EOSINOPHIL NFR BLD: 1 % (ref 0–5)
ERYTHROCYTE [DISTWIDTH] IN BLOOD BY AUTOMATED COUNT: 14.2 % (ref 11.6–14.5)
ERYTHROCYTE [SEDIMENTATION RATE] IN BLOOD: 36 MM/HR (ref 0–30)
GLUCOSE SERPL-MCNC: 151 MG/DL (ref 74–99)
HCT VFR BLD AUTO: 37.1 % (ref 35–45)
HGB BLD-MCNC: 12.7 G/DL (ref 12–16)
LYMPHOCYTES # BLD: 1.8 K/UL (ref 0.9–3.6)
LYMPHOCYTES NFR BLD: 23 % (ref 21–52)
MCH RBC QN AUTO: 28.5 PG (ref 24–34)
MCHC RBC AUTO-ENTMCNC: 34.2 G/DL (ref 31–37)
MCV RBC AUTO: 83.2 FL (ref 74–97)
MONOCYTES # BLD: 0.4 K/UL (ref 0.05–1.2)
MONOCYTES NFR BLD: 5 % (ref 3–10)
NEUTS SEG # BLD: 5.4 K/UL (ref 1.8–8)
NEUTS SEG NFR BLD: 71 % (ref 40–73)
PLATELET # BLD AUTO: 239 K/UL (ref 135–420)
PMV BLD AUTO: 9.5 FL (ref 9.2–11.8)
POTASSIUM SERPL-SCNC: 4.2 MMOL/L (ref 3.5–5.5)
RBC # BLD AUTO: 4.46 M/UL (ref 4.2–5.3)
SODIUM SERPL-SCNC: 139 MMOL/L (ref 136–145)
WBC # BLD AUTO: 7.7 K/UL (ref 4.6–13.2)

## 2018-04-06 PROCEDURE — 36415 COLL VENOUS BLD VENIPUNCTURE: CPT | Performed by: PODIATRIST

## 2018-04-06 PROCEDURE — 73650 X-RAY EXAM OF HEEL: CPT

## 2018-04-06 PROCEDURE — 85025 COMPLETE CBC W/AUTO DIFF WBC: CPT | Performed by: PODIATRIST

## 2018-04-06 PROCEDURE — 85652 RBC SED RATE AUTOMATED: CPT | Performed by: PODIATRIST

## 2018-04-06 PROCEDURE — 80048 BASIC METABOLIC PNL TOTAL CA: CPT | Performed by: PODIATRIST

## 2018-04-18 NOTE — PROGRESS NOTES
This is a little on the high side, but she can't take other statins and I don't think she can tolerate increasing Pravastatin so just tell her it is a little high and she might increase the fruits and vegetables a little in her diet and decrease animal protein a little.  ES

## 2018-04-28 ENCOUNTER — TELEPHONE (OUTPATIENT)
Dept: CARDIOLOGY CLINIC | Age: 68
End: 2018-04-28

## 2018-04-28 NOTE — TELEPHONE ENCOUNTER
LMOM for patient to call back in reference to below results.       Verbal order and read back per Arnoldo Nichole DO

## 2018-05-04 PROBLEM — E66.9 OBESITY (BMI 30.0-34.9): Status: ACTIVE | Noted: 2018-05-04

## 2018-05-24 PROBLEM — E66.01 SEVERE OBESITY (BMI 35.0-39.9) WITH COMORBIDITY (HCC): Status: ACTIVE | Noted: 2018-05-24

## 2018-07-19 ENCOUNTER — HOSPITAL ENCOUNTER (OUTPATIENT)
Dept: LAB | Age: 68
Discharge: HOME | End: 2018-07-19
Payer: COMMERCIAL

## 2018-07-19 ENCOUNTER — OFFICE VISIT (OUTPATIENT)
Dept: CARDIOLOGY CLINIC | Age: 68
End: 2018-07-19

## 2018-07-19 ENCOUNTER — CLINICAL SUPPORT (OUTPATIENT)
Dept: CARDIOLOGY CLINIC | Age: 68
End: 2018-07-19

## 2018-07-19 VITALS
HEIGHT: 67 IN | HEART RATE: 91 BPM | SYSTOLIC BLOOD PRESSURE: 140 MMHG | DIASTOLIC BLOOD PRESSURE: 74 MMHG | OXYGEN SATURATION: 99 %

## 2018-07-19 DIAGNOSIS — Z95.0 BIVENTRICULAR CARDIAC PACEMAKER IN SITU: ICD-10-CM

## 2018-07-19 DIAGNOSIS — I11.0 BENIGN HYPERTENSIVE HEART DISEASE WITH SYSTOLIC CHF, NYHA CLASS 2 (HCC): Chronic | ICD-10-CM

## 2018-07-19 DIAGNOSIS — I42.8 NONISCHEMIC CARDIOMYOPATHY (HCC): Chronic | ICD-10-CM

## 2018-07-19 DIAGNOSIS — R06.02 SOB (SHORTNESS OF BREATH): Primary | ICD-10-CM

## 2018-07-19 DIAGNOSIS — E78.5 DYSLIPIDEMIA: Chronic | ICD-10-CM

## 2018-07-19 DIAGNOSIS — I50.20 BENIGN HYPERTENSIVE HEART DISEASE WITH SYSTOLIC CHF, NYHA CLASS 2 (HCC): Chronic | ICD-10-CM

## 2018-07-19 DIAGNOSIS — R60.9 EDEMA, UNSPECIFIED TYPE: ICD-10-CM

## 2018-07-19 DIAGNOSIS — R06.02 SOB (SHORTNESS OF BREATH): ICD-10-CM

## 2018-07-19 DIAGNOSIS — I42.8 NONISCHEMIC CARDIOMYOPATHY (HCC): Primary | Chronic | ICD-10-CM

## 2018-07-19 LAB
ANION GAP SERPL CALC-SCNC: 7 MMOL/L (ref 3–18)
BASOPHILS # BLD: 0 K/UL (ref 0–0.1)
BASOPHILS NFR BLD: 0 % (ref 0–2)
BNP SERPL-MCNC: 1604 PG/ML (ref 0–900)
BUN SERPL-MCNC: 32 MG/DL (ref 7–18)
BUN/CREAT SERPL: 21 (ref 12–20)
CALCIUM SERPL-MCNC: 9.2 MG/DL (ref 8.5–10.1)
CHLORIDE SERPL-SCNC: 104 MMOL/L (ref 100–108)
CO2 SERPL-SCNC: 29 MMOL/L (ref 21–32)
CREAT SERPL-MCNC: 1.5 MG/DL (ref 0.6–1.3)
DIFFERENTIAL METHOD BLD: ABNORMAL
EOSINOPHIL # BLD: 0.2 K/UL (ref 0–0.4)
EOSINOPHIL NFR BLD: 2 % (ref 0–5)
ERYTHROCYTE [DISTWIDTH] IN BLOOD BY AUTOMATED COUNT: 14.7 % (ref 11.6–14.5)
GLUCOSE SERPL-MCNC: 213 MG/DL (ref 74–99)
HCT VFR BLD AUTO: 33.7 % (ref 35–45)
HGB BLD-MCNC: 11.5 G/DL (ref 12–16)
LYMPHOCYTES # BLD: 2 K/UL (ref 0.9–3.6)
LYMPHOCYTES NFR BLD: 25 % (ref 21–52)
MCH RBC QN AUTO: 28.1 PG (ref 24–34)
MCHC RBC AUTO-ENTMCNC: 34.1 G/DL (ref 31–37)
MCV RBC AUTO: 82.4 FL (ref 74–97)
MONOCYTES # BLD: 0.5 K/UL (ref 0.05–1.2)
MONOCYTES NFR BLD: 6 % (ref 3–10)
NEUTS SEG # BLD: 5.3 K/UL (ref 1.8–8)
NEUTS SEG NFR BLD: 67 % (ref 40–73)
PLATELET # BLD AUTO: 198 K/UL (ref 135–420)
PMV BLD AUTO: 9.9 FL (ref 9.2–11.8)
POTASSIUM SERPL-SCNC: 3.7 MMOL/L (ref 3.5–5.5)
RBC # BLD AUTO: 4.09 M/UL (ref 4.2–5.3)
SODIUM SERPL-SCNC: 140 MMOL/L (ref 136–145)
WBC # BLD AUTO: 8 K/UL (ref 4.6–13.2)

## 2018-07-19 PROCEDURE — 85025 COMPLETE CBC W/AUTO DIFF WBC: CPT | Performed by: NURSE PRACTITIONER

## 2018-07-19 PROCEDURE — 36415 COLL VENOUS BLD VENIPUNCTURE: CPT | Performed by: NURSE PRACTITIONER

## 2018-07-19 PROCEDURE — 80048 BASIC METABOLIC PNL TOTAL CA: CPT | Performed by: NURSE PRACTITIONER

## 2018-07-19 PROCEDURE — 83880 ASSAY OF NATRIURETIC PEPTIDE: CPT | Performed by: NURSE PRACTITIONER

## 2018-07-19 RX ORDER — CHOLECALCIFEROL TAB 125 MCG (5000 UNIT) 125 MCG
5000 TAB ORAL DAILY
COMMUNITY

## 2018-07-19 RX ORDER — GABAPENTIN 600 MG/1
600 TABLET ORAL 2 TIMES DAILY
COMMUNITY
End: 2018-08-24

## 2018-07-19 NOTE — PROGRESS NOTES
1. Have you been to the ER, urgent care clinic since your last visit? Hospitalized since your last visit? No    2. Have you seen or consulted any other health care providers outside of the [address] [place] [address] since your last visit? Include any pap smears or colon screening.  No

## 2018-07-19 NOTE — PROGRESS NOTES
Filiberto Hyman presents today for evaluation of complaints of edema and shortness of breath as per her PCP. She states that she has felt somewhat sluggish and fatigued as well as dyspneic with exertion. She states that this has been occurring for a few weeks. She also mentions having some occasional palpitations. She continues to have lower extremity edema, right greater than left and she has a brace on the right leg. She is a 79year old female with a history of a dilated cardiomyopathy, mild left ventricular dysfunction, and widely patent coronary arteries (cardiac catheterization done in August 1996). She also has a biventricular pacemaker but not an AICD placed in October of 2012 (in abdomen). She previously had a biventricular ICD but it was removed due to infection at the site. She was last seen by Dr. Karis Villarreal on March 12, 2018 and her last echocardiogram was done in May 2017 which showed ejection fraction of 50%, no wall motion abnormalities, RVSP of 39 mmHg. The stress test was completed on 6/19/15 showed no reversible perfusion imaging abnormalities concerning for myocardial ischemia. In April 2017, she fell at home, traumatized her back and developed an infected right psoas hematoma with development of an epidural abscess with neurological compromise with resulting paraplegia. She is feeling okay. She denies any cardiac complaints. She denies chest pain, tightness, heaviness, and palpitations. She denies shortness of breath at rest, admits to increasing dyspnea on exertion, denies orthopnea and denies PND. She denies abdominal bloating. She denies lightheadedness, dizziness, and syncope. She admits to lower extremity edema and denies claudication. Denies nausea, vomiting, diarrhea, fever, chills. She admits to joint and neuropathy pain. She admits to peripheral neuropathy.       She states that for the past 2 weeks, she has been taking lasix 60mg daily instead of the prescribed 40mg daily. PMH:  Past Medical History:   Diagnosis Date    Acute paraplegia ([address] 4/20/2017    Benign hypertensive heart disease with systolic CHF, NYHA class 2 ([address] 9/5/2012    Biventricular implantable cardioverter-defibrillator in situ 04/28/2005    Upgraded to BiV AICD; gen change 4/2008; pocket revision 10/2009; Abdominal - done on 8/22/2012 by Dr. Steve Douglas Cardiac cath 08/15/1996    Patent coronaries. Elev LVEDP. EF 50-55%.  Cardiac echocardiogram 06/23/2015    Ltd study. EF 45-50%. Mild, diffuse hypk. Severe apical hypk. No mass or thrombus was clearly identified, although imaging was suboptimal.      Cardiac nuclear imaging test 06/19/2015    Fixed distal apical, distal septal defect more likely due to RV pacing than prior infarct. No ischemia. EF 46%. RWMA c/w RV pacing. Nondiagnostic EKG on pharm stress test.      Cardiovascular lower extremity venous duplex 09/04/2012    Acute, non-occlusive DVT in CFV on right. No DVT on left. No superficial thrombosis bilaterally.  Cardiovascular upper extremity venous duplex 08/27/2012    DVT in axillary vein on left. Left subclavian was not visualized.     Chronic anemia 9/5/2012    Chronic systolic heart failure (HCC)     Decreased calculated glomerular filtration rate (GFR) 3/30/2017    Calculated GFR equivalent to that of CKD stage 3 = 30-59 ml/min    Diabetic neuropathy associated with type 2 diabetes mellitus ([address] 6/28/2011    Difficult airway for intubation 08/22/2012    see anesthesia airway note    Dyslipidemia 6/28/2011    Gout     History of complete heart block 6/28/2011    History of Coumadin therapy     Anticoagulation for DVT of the LUE; Discontinued on 3/30/2017    History of deep venous thrombosis 9/5/2012    Left upper extremity    History of pyelonephritis 3/30/2017    Left bundle branch block (LBBB) on electrocardiogram 6/28/2011    Nonischemic cardiomyopathy ([address] 6/28/2011    Obesity (BMI 35.0-39.9 without comorbidity) 3/13/2017    Obstructive sleep apnea on CPAP 2/7/2012    Psoas abscess, right ([facility] 4/20/2017    Psoas hematoma, right, secondary to anticoagulant therapy 3/30/2017    Type 2 diabetes mellitus with diabetic neuropathy ([facility] 6/28/2011       PSH:  Past Surgical History:   Procedure Laterality Date    HX CARPAL TUNNEL RELEASE  4/07    right     HX CHOLECYSTECTOMY  1994    HX HYSTERECTOMY  1973    HX OTHER SURGICAL  6/11/2012    AICD revision    HX PACEMAKER  4/28/2005    [employer] AICD       MEDS:  Current Outpatient Prescriptions   Medication Sig    gabapentin (NEURONTIN) 600 mg tablet Take 600 mg by mouth two (2) times a day.  cholecalciferol, VITAMIN D3, (VITAMIN D3) 5,000 unit tab tablet Take  by mouth daily.  carvedilol (COREG) 6.25 mg tablet     trimethoprim-sulfamethoxazole (BACTRIM DS, SEPTRA DS) 160-800 mg per tablet Take 1 Tab by mouth two (2) times a day.  baclofen (LIORESAL) 10 mg tablet 10 mg daily.  SITagliptin (JANUVIA) 50 mg tablet Take 50 mg by mouth daily.  ferrous sulfate 325 mg (65 mg iron) tablet Take 1 Tab by mouth two (2) times daily (with meals).  Lactobacillus Acidoph & Bulgar (FLORANEX) 1 million cell tab tablet Take 1 Tab by mouth two (2) times a day.  furosemide (LASIX) 40 mg tablet 1 tab daily  Indications: Edema (Patient taking differently: 40 mg. 1 tab daily  Indications: Edema, patient taking 60mg for the past 2 weeks)    gabapentin (NEURONTIN) 300 mg capsule Take 2 Caps by mouth two (2) times a day. Indications: NEUROPATHIC PAIN    insulin lispro (HUMALOG) 100 unit/mL injection See sliding scale (Patient taking differently: See sliding scale  Indications: patient taking 5 units with meals)    potassium chloride (K-DUR, KLOR-CON) 20 mEq tablet Take 1 Tab by mouth two (2) times a day.  folic acid (FOLVITE) 1 mg tablet Take 1 Tab by mouth daily.     insulin glargine (LANTUS) 100 unit/mL injection 5 Units by SubCUTAneous route nightly. Indications: type 2 diabetes mellitus (Patient taking differently: 18 Units by SubCUTAneous route nightly. Indications: type 2 diabetes mellitus)    famotidine (PEPCID) 20 mg tablet Take 1 Tab by mouth nightly. (Patient taking differently: Take 20 mg by mouth daily as needed.)    pravastatin (PRAVACHOL) 40 mg tablet Take 40 mg by mouth nightly. Indications: DYSLIPIDEMIA     No current facility-administered medications for this visit. Allergies and Sensitivities:  Allergies   Allergen Reactions    Vancomycin Itching    Ampicillin Itching    Bactrim [Sulfamethoxazole-Trimethoprim] Unknown (comments)    Blueberry Swelling     Causes throat swelling    Ciprofloxacin Itching    Codeine Other (comments)     Jumpy feeling    Crestor [Rosuvastatin] Itching    Darvocet A500 [Propoxyphene N-Acetaminophen] Itching    Demerol [Meperidine] Itching    Levaquin [Levofloxacin] Itching    Lipitor [Atorvastatin] Myalgia    Magnesium Oxide Itching     nausea    Minocin [Minocycline] Unknown (comments)    Pcn [Penicillins] Itching    Pravachol [Pravastatin] Swelling     Swelling in mouth     Shellfish Derived Unknown (comments)    Sulfa (Sulfonamide Antibiotics) Itching    Ultracet [Tramadol-Acetaminophen] Itching    Vicodin [Hydrocodone-Acetaminophen] Unknown (comments)    Vytorin 10-10 [Ezetimibe-Simvastatin] Myalgia    Percodan [Oxycodone Hcl-Oxycodone-Asa] Itching       Family History:  Family History   Problem Relation Age of Onset    Cancer Father      Leukemia       Social History:  She  reports that she has never smoked. She has never used smokeless tobacco.  She  reports that she does not drink alcohol.       Physical:  Visit Vitals    /74    Pulse 91    Ht 5' 7\" (1.702 m)    SpO2 99%       She is unable to weigh as she is non-weight bearing and paraplegic    Exam:  Neck:  Supple, no JVD, no carotid bruits  CV:  Normal S1 and  S2, grade II/VI DEE noted along the LSB, no rubs, or gallops noted  Lungs:  Clear to ausculation throughout, no wheezes or rales  Abd:  Soft, non-tender, obese, non-distended with good bowel sounds. No hepatosplenomegaly  Extremities:  1+/2+ softly pitting lower extremity edema      Data:  EKG:   Read by Alva Bhandari, DO - AV sequentially paced rhythm with one PVC and some sinus beats with atrial sensing and ventricular pacing      LABS:  Lab Results   Component Value Date/Time    Sodium 139 04/06/2018 11:19 AM    Potassium 4.2 04/06/2018 11:19 AM    Chloride 101 04/06/2018 11:19 AM    CO2 30 04/06/2018 11:19 AM    Glucose 151 (H) 04/06/2018 11:19 AM    BUN 22 (H) 04/06/2018 11:19 AM    Creatinine 1.33 (H) 04/06/2018 11:19 AM     Lab Results   Component Value Date/Time    Cholesterol, total 182 04/04/2018 09:18 AM    HDL Cholesterol 64 (H) 04/04/2018 09:18 AM    LDL, calculated 102.8 (H) 04/04/2018 09:18 AM    Triglyceride 76 04/04/2018 09:18 AM    CHOL/HDL Ratio 2.8 04/04/2018 09:18 AM     Lab Results   Component Value Date/Time    ALT (SGPT) 25 04/04/2018 09:18 AM     Impression / Plan:  1. Non-ischemic cardiomyopathy, with Bi-ventricular pacemaker but not ICD (located in abdomen), has 5 months of battery life left  2. Hypertension, blood pressure well-controlled  3. Chronic systolic heart failure,  NYHA class 2-3, stable  4. Diabetes, recommend Hgb A1c less than 7% from cardiac standpoint  5. Hypercholesterolemia, unable to tolerate statins  6. Fatigue      Mrs. Rui Jordan was seen today for evaluation of complaints of increasing edema and shortness of breath (slightly increased). She complains of some fatigue. She states that she has history of receiving blood transfusions in the past and does not believe that she has had a CBC checked in some time. Will request that she have a CBC, BMP, and NT pro-BNP done.      She was asked to take Lasix 80mg daily for 2 days only and continue her Lasix at 60mg daily as she has been taking this dose for the past 2 weeks.  Will await lab results and if her renal function is stable, we may be able to increase the dose to 80mg daily (or 40mg BID). Her breath sounds are clear and she has 1+/2+ pitting lower extremity edema. Her legs,for the most part, are always in a dependent position as she remains seated for a majority of the day. Her device was interrogated today and it showed no events and she has 5 month of battery life left. She will return for another device check in one month. Congestive heart failure teaching reinforced today. Advised to limit sodium intake to no more than 2000mg per day and to also watch fluid intake. Advised to weigh daily every morning and record weights. Instructed to call our office if progressive weight gain is noted over a 2 to 3 day period of time, shortness of breath increases, or if abdominal bloating, nausea, fatigue, or increased lower extremity edema is noted. She will follow-up with Dr. Allyn Cruz as scheduled and PRN. Pelon Sierra MSN, FNP-BC    Please note:  Portions of this chart were created with Dragon medical speech to text program.  Unrecognized errors may be present.

## 2018-07-19 NOTE — PATIENT INSTRUCTIONS
CBC, BMP, NT pro-BNP today  Lasix 80mg daily for 2 day and then back to 60mg once a day  All other medications to remain the same  Weigh daily and record  Limit sodium intake to 2000mg per day  Limit fluid intake to no more than  6, eight ounce glasses of any type of fluids per day (total of 48 ounces per day)  Call if you notice sudden or progressive weight gain (3-5 pounds in 2-3 days), increasing shortness of breath, abdominal bloating, increasing lower extremity edema, inability to lie flat or on your normal number of pillows, having to sit up to catch your breath, fatigue, increased somnolence (sleeping more), poor appetite

## 2018-07-20 NOTE — PROGRESS NOTES
I have personally seen and evaluated the device findings. Interrogation reviewed and I agree with assessment.     Amanuel Gutiérrez

## 2018-07-27 ENCOUNTER — TELEPHONE (OUTPATIENT)
Dept: CARDIOLOGY CLINIC | Age: 68
End: 2018-07-27

## 2018-07-27 NOTE — TELEPHONE ENCOUNTER
Patient requesting results of labs from last week.  
 
 
 
Verbal order and read back per Swati Traore NP

## 2018-07-27 NOTE — TELEPHONE ENCOUNTER
I called Mrs. Gonzalez and discussed lab results. Her BUN was 32 and creatinine 1.5, which are slightly higher than previous labs. She states that she did notice some improvement in her lower extremity edema after taking the increased dose of lasix for 2 days. Her NT pro-BNP was elevated at 1604. She was instructed to remain on her current dose of Lasix 60mg daily and instructed to call us if she notices worsening symptoms (i.e. Increasing shortness of breath and increasing lower extremity edema). She verbalized her understanding of our discussion.

## 2018-08-16 ENCOUNTER — HOSPITAL ENCOUNTER (INPATIENT)
Age: 68
LOS: 8 days | Discharge: HOME HEALTH | DRG: 629 | End: 2018-08-24
Attending: EMERGENCY MEDICINE | Admitting: HOSPITALIST
Payer: COMMERCIAL

## 2018-08-16 ENCOUNTER — APPOINTMENT (OUTPATIENT)
Dept: GENERAL RADIOLOGY | Age: 68
DRG: 629 | End: 2018-08-16
Attending: EMERGENCY MEDICINE
Payer: COMMERCIAL

## 2018-08-16 DIAGNOSIS — M86.171 OTHER ACUTE OSTEOMYELITIS OF RIGHT FOOT (HCC): Primary | ICD-10-CM

## 2018-08-16 PROBLEM — M86.9 FOOT OSTEOMYELITIS, RIGHT (HCC): Status: ACTIVE | Noted: 2018-08-16

## 2018-08-16 PROBLEM — M86.9 OSTEOMYELITIS (HCC): Status: ACTIVE | Noted: 2018-08-16

## 2018-08-16 LAB
ALBUMIN SERPL-MCNC: 2 G/DL (ref 3.4–5)
ALBUMIN/GLOB SERPL: 0.6 {RATIO} (ref 0.8–1.7)
ALP SERPL-CCNC: 65 U/L (ref 45–117)
ALT SERPL-CCNC: 15 U/L (ref 13–56)
ANION GAP SERPL CALC-SCNC: 6 MMOL/L (ref 3–18)
AST SERPL-CCNC: 39 U/L (ref 15–37)
BASOPHILS # BLD: 0 K/UL (ref 0–0.06)
BASOPHILS NFR BLD: 0 % (ref 0–3)
BILIRUB SERPL-MCNC: 0.6 MG/DL (ref 0.2–1)
BUN SERPL-MCNC: 20 MG/DL (ref 7–18)
BUN/CREAT SERPL: 22 (ref 12–20)
CALCIUM SERPL-MCNC: 6 MG/DL (ref 8.5–10.1)
CHLORIDE SERPL-SCNC: 113 MMOL/L (ref 100–108)
CO2 SERPL-SCNC: 22 MMOL/L (ref 21–32)
CREAT SERPL-MCNC: 0.91 MG/DL (ref 0.6–1.3)
DIFFERENTIAL METHOD BLD: ABNORMAL
EOSINOPHIL # BLD: 0.2 K/UL (ref 0–0.4)
EOSINOPHIL NFR BLD: 3 % (ref 0–5)
ERYTHROCYTE [DISTWIDTH] IN BLOOD BY AUTOMATED COUNT: 15 % (ref 11.6–14.5)
GLOBULIN SER CALC-MCNC: 3.3 G/DL (ref 2–4)
GLUCOSE SERPL-MCNC: 276 MG/DL (ref 74–99)
HCT VFR BLD AUTO: 30.9 % (ref 35–45)
HGB BLD-MCNC: 10.8 G/DL (ref 12–16)
LACTATE BLD-SCNC: 0.9 MMOL/L (ref 0.4–2)
LACTATE SERPL-SCNC: 1.1 MMOL/L (ref 0.4–2)
LYMPHOCYTES # BLD: 2 K/UL (ref 0.8–3.5)
LYMPHOCYTES NFR BLD: 28 % (ref 20–51)
MCH RBC QN AUTO: 28.3 PG (ref 24–34)
MCHC RBC AUTO-ENTMCNC: 35 G/DL (ref 31–37)
MCV RBC AUTO: 81.1 FL (ref 74–97)
MONOCYTES # BLD: 0.3 K/UL (ref 0–1)
MONOCYTES NFR BLD: 4 % (ref 2–9)
NEUTS SEG # BLD: 4.8 K/UL (ref 1.8–8)
NEUTS SEG NFR BLD: 65 % (ref 42–75)
PLATELET # BLD AUTO: 171 K/UL (ref 135–420)
PLATELET COMMENTS,PCOM: ABNORMAL
PMV BLD AUTO: 10.3 FL (ref 9.2–11.8)
POTASSIUM SERPL-SCNC: 3.8 MMOL/L (ref 3.5–5.5)
PROT SERPL-MCNC: 5.3 G/DL (ref 6.4–8.2)
RBC # BLD AUTO: 3.81 M/UL (ref 4.2–5.3)
RBC MORPH BLD: ABNORMAL
SODIUM SERPL-SCNC: 141 MMOL/L (ref 136–145)
WBC # BLD AUTO: 7.3 K/UL (ref 4.6–13.2)

## 2018-08-16 PROCEDURE — 74011250636 HC RX REV CODE- 250/636

## 2018-08-16 PROCEDURE — 74011250636 HC RX REV CODE- 250/636: Performed by: EMERGENCY MEDICINE

## 2018-08-16 PROCEDURE — 74011250637 HC RX REV CODE- 250/637: Performed by: HOSPITALIST

## 2018-08-16 PROCEDURE — 77030005538 HC CATH URETH FOL44 BARD -B

## 2018-08-16 PROCEDURE — 87040 BLOOD CULTURE FOR BACTERIA: CPT | Performed by: EMERGENCY MEDICINE

## 2018-08-16 PROCEDURE — 74011000258 HC RX REV CODE- 258: Performed by: EMERGENCY MEDICINE

## 2018-08-16 PROCEDURE — 96365 THER/PROPH/DIAG IV INF INIT: CPT

## 2018-08-16 PROCEDURE — 83605 ASSAY OF LACTIC ACID: CPT | Performed by: EMERGENCY MEDICINE

## 2018-08-16 PROCEDURE — 74011250636 HC RX REV CODE- 250/636: Performed by: HOSPITALIST

## 2018-08-16 PROCEDURE — 96366 THER/PROPH/DIAG IV INF ADDON: CPT

## 2018-08-16 PROCEDURE — 83605 ASSAY OF LACTIC ACID: CPT

## 2018-08-16 PROCEDURE — 74011636637 HC RX REV CODE- 636/637: Performed by: HOSPITALIST

## 2018-08-16 PROCEDURE — 96368 THER/DIAG CONCURRENT INF: CPT

## 2018-08-16 PROCEDURE — 74011636637 HC RX REV CODE- 636/637: Performed by: INTERNAL MEDICINE

## 2018-08-16 PROCEDURE — 85025 COMPLETE CBC W/AUTO DIFF WBC: CPT | Performed by: EMERGENCY MEDICINE

## 2018-08-16 PROCEDURE — 36415 COLL VENOUS BLD VENIPUNCTURE: CPT | Performed by: EMERGENCY MEDICINE

## 2018-08-16 PROCEDURE — 65270000029 HC RM PRIVATE

## 2018-08-16 PROCEDURE — 80053 COMPREHEN METABOLIC PANEL: CPT | Performed by: EMERGENCY MEDICINE

## 2018-08-16 PROCEDURE — 99283 EMERGENCY DEPT VISIT LOW MDM: CPT

## 2018-08-16 PROCEDURE — 73630 X-RAY EXAM OF FOOT: CPT

## 2018-08-16 RX ORDER — MORPHINE SULFATE 2 MG/ML
2 INJECTION, SOLUTION INTRAMUSCULAR; INTRAVENOUS
Status: COMPLETED | OUTPATIENT
Start: 2018-08-16 | End: 2018-08-16

## 2018-08-16 RX ORDER — SODIUM CHLORIDE 9 MG/ML
75 INJECTION, SOLUTION INTRAVENOUS CONTINUOUS
Status: DISCONTINUED | OUTPATIENT
Start: 2018-08-16 | End: 2018-08-24 | Stop reason: HOSPADM

## 2018-08-16 RX ORDER — INSULIN GLARGINE 100 [IU]/ML
10 INJECTION, SOLUTION SUBCUTANEOUS
Status: DISCONTINUED | OUTPATIENT
Start: 2018-08-17 | End: 2018-08-21

## 2018-08-16 RX ORDER — METRONIDAZOLE 500 MG/100ML
500 INJECTION, SOLUTION INTRAVENOUS EVERY 8 HOURS
Status: DISCONTINUED | OUTPATIENT
Start: 2018-08-16 | End: 2018-08-16

## 2018-08-16 RX ORDER — DEXTROSE 50 % IN WATER (D50W) INTRAVENOUS SYRINGE
25-50 AS NEEDED
Status: DISCONTINUED | OUTPATIENT
Start: 2018-08-16 | End: 2018-08-24 | Stop reason: HOSPADM

## 2018-08-16 RX ORDER — MORPHINE SULFATE 2 MG/ML
INJECTION, SOLUTION INTRAMUSCULAR; INTRAVENOUS
Status: COMPLETED
Start: 2018-08-16 | End: 2018-08-16

## 2018-08-16 RX ORDER — GABAPENTIN 300 MG/1
600 CAPSULE ORAL 2 TIMES DAILY
Status: DISCONTINUED | OUTPATIENT
Start: 2018-08-16 | End: 2018-08-24 | Stop reason: HOSPADM

## 2018-08-16 RX ORDER — INSULIN LISPRO 100 [IU]/ML
INJECTION, SOLUTION INTRAVENOUS; SUBCUTANEOUS
Status: DISCONTINUED | OUTPATIENT
Start: 2018-08-17 | End: 2018-08-24 | Stop reason: HOSPADM

## 2018-08-16 RX ORDER — MAGNESIUM SULFATE 100 %
4 CRYSTALS MISCELLANEOUS AS NEEDED
Status: DISCONTINUED | OUTPATIENT
Start: 2018-08-16 | End: 2018-08-24 | Stop reason: HOSPADM

## 2018-08-16 RX ORDER — INSULIN GLARGINE 100 [IU]/ML
5 INJECTION, SOLUTION SUBCUTANEOUS
Status: DISCONTINUED | OUTPATIENT
Start: 2018-08-16 | End: 2018-08-16

## 2018-08-16 RX ORDER — PRAVASTATIN SODIUM 20 MG/1
40 TABLET ORAL
Status: DISCONTINUED | OUTPATIENT
Start: 2018-08-16 | End: 2018-08-24 | Stop reason: HOSPADM

## 2018-08-16 RX ORDER — ACETAMINOPHEN 325 MG/1
650 TABLET ORAL
Status: DISCONTINUED | OUTPATIENT
Start: 2018-08-16 | End: 2018-08-24 | Stop reason: HOSPADM

## 2018-08-16 RX ORDER — INSULIN LISPRO 100 [IU]/ML
INJECTION, SOLUTION INTRAVENOUS; SUBCUTANEOUS
Status: DISCONTINUED | OUTPATIENT
Start: 2018-08-17 | End: 2018-08-16

## 2018-08-16 RX ORDER — INSULIN LISPRO 100 [IU]/ML
5 INJECTION, SOLUTION INTRAVENOUS; SUBCUTANEOUS
Status: DISCONTINUED | OUTPATIENT
Start: 2018-08-16 | End: 2018-08-24 | Stop reason: HOSPADM

## 2018-08-16 RX ORDER — LANOLIN ALCOHOL/MO/W.PET/CERES
325 CREAM (GRAM) TOPICAL 2 TIMES DAILY WITH MEALS
Status: DISCONTINUED | OUTPATIENT
Start: 2018-08-16 | End: 2018-08-24 | Stop reason: HOSPADM

## 2018-08-16 RX ORDER — HEPARIN SODIUM 5000 [USP'U]/ML
5000 INJECTION, SOLUTION INTRAVENOUS; SUBCUTANEOUS EVERY 8 HOURS
Status: DISCONTINUED | OUTPATIENT
Start: 2018-08-16 | End: 2018-08-24 | Stop reason: HOSPADM

## 2018-08-16 RX ORDER — METRONIDAZOLE 500 MG/100ML
500 INJECTION, SOLUTION INTRAVENOUS EVERY 12 HOURS
Status: DISCONTINUED | OUTPATIENT
Start: 2018-08-16 | End: 2018-08-23 | Stop reason: ALTCHOICE

## 2018-08-16 RX ORDER — CARVEDILOL 6.25 MG/1
6.25 TABLET ORAL 2 TIMES DAILY WITH MEALS
Status: DISCONTINUED | OUTPATIENT
Start: 2018-08-16 | End: 2018-08-24 | Stop reason: HOSPADM

## 2018-08-16 RX ORDER — DIPHENHYDRAMINE HCL 12.5MG/5ML
12.5 ELIXIR ORAL ONCE
Status: DISCONTINUED | OUTPATIENT
Start: 2018-08-17 | End: 2018-08-16

## 2018-08-16 RX ORDER — FOLIC ACID 1 MG/1
1 TABLET ORAL DAILY
Status: DISCONTINUED | OUTPATIENT
Start: 2018-08-17 | End: 2018-08-24 | Stop reason: HOSPADM

## 2018-08-16 RX ORDER — INSULIN GLARGINE 100 [IU]/ML
10 INJECTION, SOLUTION SUBCUTANEOUS
Status: DISCONTINUED | OUTPATIENT
Start: 2018-08-17 | End: 2018-08-16

## 2018-08-16 RX ORDER — ONDANSETRON 2 MG/ML
4 INJECTION INTRAMUSCULAR; INTRAVENOUS
Status: DISCONTINUED | OUTPATIENT
Start: 2018-08-16 | End: 2018-08-24 | Stop reason: HOSPADM

## 2018-08-16 RX ORDER — FUROSEMIDE 40 MG/1
40 TABLET ORAL DAILY
Status: DISCONTINUED | OUTPATIENT
Start: 2018-08-17 | End: 2018-08-24 | Stop reason: HOSPADM

## 2018-08-16 RX ORDER — DIPHENHYDRAMINE HCL 12.5MG/5ML
12.5 ELIXIR ORAL
Status: DISCONTINUED | OUTPATIENT
Start: 2018-08-16 | End: 2018-08-20

## 2018-08-16 RX ORDER — FAMOTIDINE 20 MG/1
20 TABLET, FILM COATED ORAL
Status: DISCONTINUED | OUTPATIENT
Start: 2018-08-16 | End: 2018-08-24 | Stop reason: HOSPADM

## 2018-08-16 RX ORDER — BACLOFEN 10 MG/1
10 TABLET ORAL DAILY
Status: DISCONTINUED | OUTPATIENT
Start: 2018-08-17 | End: 2018-08-24 | Stop reason: HOSPADM

## 2018-08-16 RX ADMIN — METRONIDAZOLE 500 MG: 500 INJECTION, SOLUTION INTRAVENOUS at 11:22

## 2018-08-16 RX ADMIN — VANCOMYCIN HYDROCHLORIDE 2000 MG: 10 INJECTION, POWDER, LYOPHILIZED, FOR SOLUTION INTRAVENOUS at 13:49

## 2018-08-16 RX ADMIN — PRAVASTATIN SODIUM 40 MG: 20 TABLET ORAL at 22:35

## 2018-08-16 RX ADMIN — AZTREONAM 2 G: 2 INJECTION, POWDER, LYOPHILIZED, FOR SOLUTION INTRAMUSCULAR; INTRAVENOUS at 12:39

## 2018-08-16 RX ADMIN — GABAPENTIN 600 MG: 300 CAPSULE ORAL at 17:32

## 2018-08-16 RX ADMIN — CARVEDILOL 6.25 MG: 6.25 TABLET, FILM COATED ORAL at 17:33

## 2018-08-16 RX ADMIN — FERROUS SULFATE TAB 325 MG (65 MG ELEMENTAL FE) 325 MG: 325 (65 FE) TAB at 17:32

## 2018-08-16 RX ADMIN — METRONIDAZOLE 500 MG: 500 INJECTION, SOLUTION INTRAVENOUS at 22:36

## 2018-08-16 RX ADMIN — HEPARIN SODIUM 5000 UNITS: 5000 INJECTION, SOLUTION INTRAVENOUS; SUBCUTANEOUS at 17:33

## 2018-08-16 RX ADMIN — MORPHINE SULFATE 2 MG: 2 INJECTION, SOLUTION INTRAMUSCULAR; INTRAVENOUS at 16:33

## 2018-08-16 RX ADMIN — SODIUM CHLORIDE 75 ML/HR: 900 INJECTION, SOLUTION INTRAVENOUS at 17:32

## 2018-08-16 RX ADMIN — INSULIN GLARGINE 10 UNITS: 100 INJECTION, SOLUTION SUBCUTANEOUS at 23:25

## 2018-08-16 RX ADMIN — INSULIN LISPRO 15 UNITS: 100 INJECTION, SOLUTION INTRAVENOUS; SUBCUTANEOUS at 23:26

## 2018-08-16 RX ADMIN — AZTREONAM 2 G: 2 INJECTION, POWDER, LYOPHILIZED, FOR SOLUTION INTRAMUSCULAR; INTRAVENOUS at 17:33

## 2018-08-16 RX ADMIN — INSULIN LISPRO 5 UNITS: 100 INJECTION, SOLUTION INTRAVENOUS; SUBCUTANEOUS at 17:55

## 2018-08-16 RX ADMIN — Medication 2 MG: at 16:33

## 2018-08-16 NOTE — Clinical Note
Status[de-identified] Inpatient [101] Type of Bed: Medical [8] Inpatient Hospitalization Certified Necessary for the Following Reasons: 3. Patient receiving treatment that can only be provided in an inpatient setting (further clarification in H&P documentation) Admitting Diagnosis: Osteomyelitis ([facility] [[id number]] Admitting Physician: Renay Kendrick [[id number]] Attending Physician: Renay Kendrick [[id number]] Estimated Length of Stay: 2 Midnights Discharge Plan[de-identified] Home with Office Follow-up

## 2018-08-16 NOTE — ED NOTES
Pt. Difficult stick. IV access attained but no blood return. Tech bedside attaining access @ this time.

## 2018-08-16 NOTE — PROGRESS NOTES
Bedside and Verbal shift change report given to Pauline Apgar, RN (oncoming nurse) by Molly Marie RN (offgoing nurse). Report included the following information SBAR, Kardex, Intake/Output, MAR and Recent Results.

## 2018-08-16 NOTE — IP AVS SNAPSHOT
[address] Patient: Remigio Rush MRN: [id number] About your hospitalization You were admitted on:  August 16, 2018 You last received care in the:  [facility] [address] You were discharged on:  August 24, 2018 Why you were hospitalized Your primary diagnosis was:  Not on File Your diagnoses also included:  Osteomyelitis (Hcc), Foot Osteomyelitis, Right (Hcc) Follow-up Information Follow up With Details Comments Contact Info Poonam Melara DO On 8/31/2018 @1:20 w/ NP [address] [facility] [address] [facility] [id number] [phone] Your Scheduled Appointments Friday September 07, 2018 11:10 AM EDT Nurse Visit with Claire Mcqueen [facility]) [address] [place] [address] 200 200 [facility]  
[phone] Monday September 17, 2018 12:40 PM EDT Follow Up with Jonel Morgan DO [facility] ([facility]) [facility] [address]  
[phone] Monday September 17, 2018 12:40 PM EDT PROCEDURE with Pacer Hv Csi [facility] ([facility]) [facility] [address]  
[phone] Discharge Orders None A check bimal indicates which time of day the medication should be taken. My Medications START taking these medications Instructions Each Dose to Equal  
 Morning Noon Evening Bedtime  
 cefpodoxime 200 mg tablet Commonly known as:  Addis Menon Your last dose was: Your next dose is: Take 2 Tabs by mouth every twelve (12) hours for 27 days. 400 mg CHANGE how you take these medications Instructions Each Dose to Equal  
 Morning Noon Evening Bedtime famotidine 20 mg tablet Commonly known as:  PEPCID What changed:   
- when to take this 
- reasons to take this Your last dose was: Your next dose is: Take 1 Tab by mouth nightly. 20 mg  
    
   
   
   
  
 furosemide 40 mg tablet Commonly known as:  LASIX What changed:   
- how much to take 
- additional instructions Your last dose was: Your next dose is:    
   
   
 1 tab daily  Indications: Edema  
     
   
   
   
  
 gabapentin 300 mg capsule Commonly known as:  NEURONTIN What changed:  Another medication with the same name was removed. Continue taking this medication, and follow the directions you see here. Your last dose was: Your next dose is: Take 2 Caps by mouth two (2) times a day. Indications: NEUROPATHIC PAIN  
 600 mg  
    
   
   
   
  
 insulin glargine 100 unit/mL injection Commonly known as:  LANTUS U-100 INSULIN What changed:  how much to take Your last dose was: Your next dose is:    
   
   
 5 Units by SubCUTAneous route nightly. Indications: type 2 diabetes mellitus 5 Units  
    
   
   
   
  
 insulin lispro 100 unit/mL injection Commonly known as:  HUMALOG What changed:  additional instructions Your last dose was: Your next dose is:    
   
   
 See sliding scale CONTINUE taking these medications Instructions Each Dose to Equal  
 Morning Noon Evening Bedtime  
 baclofen 10 mg tablet Commonly known as:  LIORESAL Your last dose was: Your next dose is:    
   
   
 10 mg daily. 10 mg  
    
   
   
   
  
 carvedilol 6.25 mg tablet Commonly known as:  Rock Cervantes Your last dose was: Your next dose is:    
   
   
      
   
   
   
  
 cholecalciferol (VITAMIN D3) 5,000 unit Tab tablet Commonly known as:  VITAMIN D3 Your last dose was: Your next dose is: Take  by mouth daily. ferrous sulfate 325 mg (65 mg iron) tablet Your last dose was: Your next dose is: Take 1 Tab by mouth two (2) times daily (with meals). 325 mg  
    
   
   
   
  
 folic acid 1 mg tablet Commonly known as:  Google Your last dose was: Your next dose is: Take 1 Tab by mouth daily. 1 mg JANUVIA 50 mg tablet Generic drug:  SITagliptin Your last dose was: Your next dose is: Take 50 mg by mouth daily. 50 mg Lactobacillus Acidoph & Bulgar 1 million cell Tab tablet Commonly known as:  Danyelle Mali Your last dose was: Your next dose is: Take 1 Tab by mouth two (2) times a day. 1 Tab  
    
   
   
   
  
 potassium chloride 20 mEq tablet Commonly known as:  K-DUR, KLOR-CON Your last dose was: Your next dose is: Take 1 Tab by mouth two (2) times a day. 20 mEq  
    
   
   
   
  
 pravastatin 40 mg tablet Commonly known as:  PRAVACHOL Your last dose was: Your next dose is: Take 40 mg by mouth nightly. Indications: DYSLIPIDEMIA 40 mg  
    
   
   
   
  
  
STOP taking these medications   
 trimethoprim-sulfamethoxazole 160-800 mg per tablet Commonly known as:  BACTRIM DS, SEPTRA DS Where to Get Your Medications Information on where to get these meds will be given to you by the nurse or doctor. ! Ask your nurse or doctor about these medications  
  cefpodoxime 200 mg tablet Discharge Instructions Osteomyelitis: Care Instructions Your Care Instructions Osteomyelitis (say \"sq-zdvz-po-nm-iv-ZI-tus\") is a bone infection. It is caused by bacteria. The bacteria can infect the bone where it has been injured, or they can be carried through the blood from another area in the body. Osteomyelitis can be a short- or long-term problem. It is treated with antibiotics. You may get the antibiotics as pills or through a needle in a vein (IV). You will probably get treatment in the hospital at first. The type of treatment depends on the type of bacteria causing the infection, the bones affected, and how bad the infection is. Sometimes people need surgery to drain pus from bone or to fix damaged bone. Short-term osteomyelitis that is treated right away usually can be cured. But the long-term form sometimes comes back after treatment. You can help your chances of stopping the infection by taking your medicines as directed. Follow-up care is a key part of your treatment and safety. Be sure to make and go to all appointments, and call your doctor if you are having problems. It's also a good idea to know your test results and keep a list of the medicines you take. How can you care for yourself at home? · Take your antibiotics as directed. Do not stop taking them just because you feel better. You need to take the full course of antibiotics. · Take pain medicines exactly as directed. ¨ If the doctor gave you a prescription medicine for pain, take it as prescribed. ¨ If you are not taking a prescription pain medicine, ask your doctor if you can take an over-the-counter medicine. · Do mild exercise and stretching if your doctor says it is okay. This can help keep your bones and muscles healthy. Avoid strenuous work or exercise until your doctor says you can do it. · Consider physical therapy if your doctor suggests it. Physical therapy may help you have a normal range of movement. · Do not smoke. Smoking can slow healing of the infection. If you need help quitting, talk to your doctor about stop-smoking programs and medicines. These can increase your chances of quitting for good. When should you call for help? Call 911 anytime you think you may need emergency care. For example, call if:   · You have severe bone pain.  
 Call your doctor now or seek immediate medical care if: 
  · You continue to have bone pain.  
  · You have signs of infection, such as: 
¨ Increased pain, swelling, warmth, or redness. ¨ Red streaks leading from a wound. ¨ Pus draining from a wound. ¨ A fever.  
 Watch closely for changes in your health, and be sure to contact your doctor if: 
  · You do not get better as expected. Where can you learn more? Go to http://aren-mirella.info/. Enter Y536 in the search box to learn more about \"Osteomyelitis: Care Instructions. \" Current as of: November 21, 2017 Content Version: 11.7 © 3691-9725 [employer]. Care instructions adapted under license by [employer] (which disclaims liability or warranty for this information). If you have questions about a medical condition or this instruction, always ask your healthcare professional. [place] [address] any warranty or liability for your use of this information. [employer] Announcement We are excited to announce that we are making your provider's discharge notes available to you in [employer]. You will see these notes when they are completed and signed by the physician that discharged you from your recent hospital stay. If you have any questions or concerns about any information you see in [employer], please call the Health Information Department where you were seen or reach out to your Primary Care Provider for more information about your plan of care. Introducing Ángel Pennington As a [facility] patient, I wanted to make you aware of our electronic visit tool called Ángel Pennington. [facility] 24/7 allows you to connect within minutes with a medical provider 24 hours a day, seven days a week via a mobile device or tablet or logging into a secure website from your computer. You can access Ángel Pennington from anywhere in the [place]. A virtual visit might be right for you when you have a simple condition and feel like you just dont want to get out of bed, or cant get away from work for an appointment, when your regular [facility] provider is not available (evenings, weekends or holidays), or when youre out of town and need minor care. Electronic visits cost only $49 and if the [facility] 24/[facility] provider determines a prescription is needed to treat your condition, one can be electronically transmitted to a nearby pharmacy*. Please take a moment to enroll today if you have not already done so. The enrollment process is free and takes just a few minutes. To enroll, please download the [employer] zaire to your tablet or phone, or visit [email or web address] to enroll on your computer. And, as an [address] [place] [address] patient with a vozero account, the results of your visits will be scanned into your electronic medical record and your primary care provider will be able to view the scanned results. We urge you to continue to see your regular [facility] provider for your ongoing medical care. And while your primary care provider may not be the one available when you seek a Ángel Seed[employer]Spark[employer] virtual visit, the peace of mind you get from getting a real diagnosis real time can be priceless. For more information on [employer], view our Frequently Asked Questions (FAQs) at [email or web address]. Sincerely, 
 
Adriana Merritt MD 
Chief Medical Officer Dat Sweta Mas *:  certain medications cannot be prescribed via [employer] Unresulted Labs-Please follow up with your PCP about these lab tests Order Current Status CULTURE, ANAEROBIC Preliminary result CULTURE, TISSUE W GRAM STAIN Preliminary result Providers Seen During Your Hospitalization Provider Specialty Primary office phone Toña Page MD Emergency Medicine [phone] Elvia Essex, MD Internal Medicine [phone] Sheila Juarez MD Internal Medicine [phone] Your Primary Care Physician (PCP) Primary Care Physician Office Phone Office Fax Yoandy Mercado [phone] You are allergic to the following Allergen Reactions Vancomycin Itching Ampicillin Itching Bactrim (Sulfamethoxazole-Trimethoprim) Unknown (comments) Blueberry Swelling Causes throat swelling Ciprofloxacin Itching Codeine Other (comments) Jumpy feeling Crestor (Rosuvastatin) Itching Darvocet A500 (Propoxyphene N-Acetaminophen) Itching Demerol (Meperidine) Itching Levaquin (Levofloxacin) Itching Lipitor (Atorvastatin) Myalgia Magnesium Oxide Itching  
 nausea Minocin (Minocycline) Unknown (comments) Pcn (Penicillins) Itching Pravachol (Pravastatin) Swelling Swelling in mouth. Not allergic per patient, takes at home Shellfish Derived Unknown (comments) Sulfa (Sulfonamide Antibiotics) Itching Ultracet (Tramadol-Acetaminophen) Itching Vicodin (Hydrocodone-Acetaminophen) Unknown (comments) Vytorin 10-10 (Ezetimibe-Simvastatin) Myalgia Percodan (Oxycodone Hcl-Oxycodone-Asa) Itching Recent Documentation Height Weight BMI OB Status Smoking Status 1.702 m 120.2 kg 41.5 kg/m2 Hysterectomy Never Smoker Emergency Contacts Name Discharge Info Relation Home Work Mobile Tomi Gonzalez DISCHARGE CAREGIVER [3] Spouse [3] [phone] Eulalio Severin  Spouse [3] [phone] Patient Belongings The following personal items are in your possession at time of discharge: 
  Dental Appliances: None  Visual Aid: None      Home Medications: None   Jewelry: Earrings  Clothing: Pants, Shirt, Slippers, Undergarments    Other Valuables: Cell Phone Please provide this summary of care documentation to your next provider. Signatures-by signing, you are acknowledging that this After Visit Summary has been reviewed with you and you have received a copy. Patient Signature:  ____________________________________________________________ Date:  ____________________________________________________________  
  
Suzon Mems Provider Signature:  ____________________________________________________________ Date:  ____________________________________________________________

## 2018-08-16 NOTE — ED NOTES
TRANSFER - OUT REPORT:    Verbal report given to Oswald Simental (N Christiana) (name) on Nela Crews  being transferred to  (unit) for routine progression of care       Report consisted of patients Situation, Background, Assessment and   Recommendations(SBAR). Information from the following report(s) SBAR, ED Summary and MAR was reviewed with the receiving nurse. Lines:   Peripheral IV 08/16/18 Left Wrist (Active)   Site Assessment Clean, dry, & intact 8/16/2018  3:19 PM   Phlebitis Assessment 0 8/16/2018  3:19 PM   Infiltration Assessment 0 8/16/2018  3:19 PM   Dressing Status Clean, dry, & intact 8/16/2018  3:19 PM   Dressing Type Transparent 8/16/2018  3:19 PM   Hub Color/Line Status Flushed;Pink 8/16/2018  3:19 PM        Opportunity for questions and clarification was provided.       Patient transported with:  Patient's belongings

## 2018-08-16 NOTE — IP AVS SNAPSHOT
Anila Reese 
 
 
 [address] Patient: Selam Carrera MRN: [id number]:[id number] A check bimal indicates which time of day the medication should be taken. My Medications START taking these medications Instructions Each Dose to Equal  
 Morning Noon Evening Bedtime  
 cefpodoxime 200 mg tablet Commonly known as:  Richad Ioana Your last dose was: Your next dose is: Take 2 Tabs by mouth every twelve (12) hours for 27 days. 400 mg CHANGE how you take these medications Instructions Each Dose to Equal  
 Morning Noon Evening Bedtime  
 famotidine 20 mg tablet Commonly known as:  PEPCID What changed:   
- when to take this 
- reasons to take this Your last dose was: Your next dose is: Take 1 Tab by mouth nightly. 20 mg  
    
   
   
   
  
 furosemide 40 mg tablet Commonly known as:  LASIX What changed:   
- how much to take 
- additional instructions Your last dose was: Your next dose is:    
   
   
 1 tab daily  Indications: Edema  
     
   
   
   
  
 gabapentin 300 mg capsule Commonly known as:  NEURONTIN What changed:  Another medication with the same name was removed. Continue taking this medication, and follow the directions you see here. Your last dose was: Your next dose is: Take 2 Caps by mouth two (2) times a day. Indications: NEUROPATHIC PAIN  
 600 mg  
    
   
   
   
  
 insulin glargine 100 unit/mL injection Commonly known as:  LANTUS U-100 INSULIN What changed:  how much to take Your last dose was: Your next dose is:    
   
   
 5 Units by SubCUTAneous route nightly. Indications: type 2 diabetes mellitus 5 Units  
    
   
   
   
  
 insulin lispro 100 unit/mL injection Commonly known as:  HUMALOG What changed:  additional instructions Your last dose was: Your next dose is:    
   
   
 See sliding scale CONTINUE taking these medications Instructions Each Dose to Equal  
 Morning Noon Evening Bedtime  
 baclofen 10 mg tablet Commonly known as:  LIORESAL Your last dose was: Your next dose is:    
   
   
 10 mg daily. 10 mg  
    
   
   
   
  
 carvedilol 6.25 mg tablet Commonly known as:  Macel Royal Your last dose was: Your next dose is:    
   
   
      
   
   
   
  
 cholecalciferol (VITAMIN D3) 5,000 unit Tab tablet Commonly known as:  VITAMIN D3 Your last dose was: Your next dose is: Take  by mouth daily. ferrous sulfate 325 mg (65 mg iron) tablet Your last dose was: Your next dose is: Take 1 Tab by mouth two (2) times daily (with meals). 325 mg  
    
   
   
   
  
 folic acid 1 mg tablet Commonly known as:  Kurt Your last dose was: Your next dose is: Take 1 Tab by mouth daily. 1 mg JANUVIA 50 mg tablet Generic drug:  SITagliptin Your last dose was: Your next dose is: Take 50 mg by mouth daily. 50 mg Lactobacillus Acidoph & Bulgar 1 million cell Tab tablet Commonly known as:  Horacio Reynoso Your last dose was: Your next dose is: Take 1 Tab by mouth two (2) times a day. 1 Tab  
    
   
   
   
  
 potassium chloride 20 mEq tablet Commonly known as:  K-DUR, KLOR-CON Your last dose was: Your next dose is: Take 1 Tab by mouth two (2) times a day. 20 mEq  
    
   
   
   
  
 pravastatin 40 mg tablet Commonly known as:  PRAVACHOL Your last dose was: Your next dose is: Take 40 mg by mouth nightly. Indications: DYSLIPIDEMIA 40 mg  
    
   
   
   
  
  
STOP taking these medications trimethoprim-sulfamethoxazole 160-800 mg per tablet Commonly known as:  BACTRIM DS, SEPTRA DS Where to Get Your Medications Information on where to get these meds will be given to you by the nurse or doctor. ! Ask your nurse or doctor about these medications  
  cefpodoxime 200 mg tablet

## 2018-08-16 NOTE — H&P
HISTORY & PHYSICAL            Patient: Danna Funes MRN: [id number]  CSN: [id number]    YOB: 1950  Age: 76 y.o. Sex: female    DOA: 8/16/2018 LOS:  LOS: 0 days        DOA: 8/16/2018        Assessment/Plan     Active Problems:    Osteomyelitis ([address] (8/16/2018)      Foot osteomyelitis, right ([facility] [address] (8/16/2018)        Plan:  1. R foot OM -- IV Abx , podiatry consulted by ER - will consult & possible plan for debridement   2. H/o HTN - resume home meds, monitor BP   3. H/o T2DM - resume lantus & SS insulin - monitor BS   4. H/o paraplegia - mentions she is paralysed from waist down & is currently in a motorized wheelchair   5. H/o CAD - stable per pt   6. H/o CVA   7. H/o HLPD - continue lipitor   8. ? H/o A fib - has a PPM in place   9. H/o CHF - continue lasix   DVT px - Heparin   FC           Severity of Signs & Symptoms -- Moderate  Risk of adverse events -- Moderate  Current Medical Rx Plan - As Above   Patient history & comorbidities - Per HPI  Discharge Plan -- Home            HPI:     Danna Funes is a 76 y.o. female who has a h/o  DM2, HTN, CHF, CAD and paraplegia who presents from her Podiatrist office due to worsening wound on her right heel since December. Pt mentions she developed the wound from hitting her foot on a curb when she was getting fitted for a wheel chair   She says the wound on her R heel has been hard to heal with occasional foul smelling discharge   Says she has been getting chills intermittently but the swelling in her right leg is worse. Denies CP, SOB, fever, or any other associated sx. No other complaints or concerns.   ER eval - X ray R foot done - report pending   Podiatry consulted   Will admit for IV Abx     Past Medical History:   Diagnosis Date    Acute paraplegia ([facility] [address] 4/20/2017    Benign hypertensive heart disease with systolic CHF, NYHA class 2 ([facility] [address] 9/5/2012    Biventricular implantable cardioverter-defibrillator in situ 04/28/2005    Upgraded to BiV AICD; gen change 4/2008; pocket revision 10/2009; Abdominal - done on 8/22/2012 by Dr. Jason Metzger Cardiac cath 08/15/1996    Patent coronaries. Elev LVEDP. EF 50-55%.  Cardiac echocardiogram 06/23/2015    Ltd study. EF 45-50%. Mild, diffuse hypk. Severe apical hypk. No mass or thrombus was clearly identified, although imaging was suboptimal.      Cardiac nuclear imaging test 06/19/2015    Fixed distal apical, distal septal defect more likely due to RV pacing than prior infarct. No ischemia. EF 46%. RWMA c/w RV pacing. Nondiagnostic EKG on pharm stress test.      Cardiovascular lower extremity venous duplex 09/04/2012    Acute, non-occlusive DVT in CFV on right. No DVT on left. No superficial thrombosis bilaterally.  Cardiovascular upper extremity venous duplex 08/27/2012    DVT in axillary vein on left. Left subclavian was not visualized.     Chronic anemia 9/5/2012    Chronic systolic heart failure (HCC)     Decreased calculated glomerular filtration rate (GFR) 3/30/2017    Calculated GFR equivalent to that of CKD stage 3 = 30-59 ml/min    Diabetic neuropathy associated with type 2 diabetes mellitus ([address] 6/28/2011    Difficult airway for intubation 08/22/2012    see anesthesia airway note    Dyslipidemia 6/28/2011    Gout     History of complete heart block 6/28/2011    History of Coumadin therapy     Anticoagulation for DVT of the LUE; Discontinued on 3/30/2017    History of deep venous thrombosis 9/5/2012    Left upper extremity    History of pyelonephritis 3/30/2017    Left bundle branch block (LBBB) on electrocardiogram 6/28/2011    Nonischemic cardiomyopathy ([address] 6/28/2011    Obesity (BMI 35.0-39.9 without comorbidity) 3/13/2017    Obstructive sleep apnea on CPAP 2/7/2012    Psoas abscess, right ([address] 4/20/2017    Psoas hematoma, right, secondary to anticoagulant therapy 3/30/2017    Type 2 diabetes mellitus with diabetic neuropathy ([address] 6/28/2011       Past Surgical History:   Procedure Laterality Date    HX CARPAL TUNNEL RELEASE  4/07    right     HX CHOLECYSTECTOMY  1994    HX HYSTERECTOMY  1973    HX OTHER SURGICAL  6/11/2012    AICD revision    HX PACEMAKER  4/28/2005    [employer] AICD       Family History   Problem Relation Age of Onset    Cancer Father      Leukemia       Social History     Social History    Marital status:      Spouse name: N/A    Number of children: N/A    Years of education: N/A     Social History Main Topics    Smoking status: Never Smoker    Smokeless tobacco: Never Used    Alcohol use No    Drug use: No    Sexual activity: Yes     Partners: Male     Other Topics Concern    None     Social History Narrative       Prior to Admission medications    Medication Sig Start Date End Date Taking? Authorizing Provider   gabapentin (NEURONTIN) 600 mg tablet Take 600 mg by mouth two (2) times a day. Yes Historical Provider   cholecalciferol, VITAMIN D3, (VITAMIN D3) 5,000 unit tab tablet Take  by mouth daily. Yes Historical Provider   carvedilol (COREG) 6.25 mg tablet  5/21/18  Yes Historical Provider   SITagliptin (JANUVIA) 50 mg tablet Take 50 mg by mouth daily. Yes Historical Provider   ferrous sulfate 325 mg (65 mg iron) tablet Take 1 Tab by mouth two (2) times daily (with meals). 6/16/17  Yes Tatianna Regan MD   furosemide (LASIX) 40 mg tablet 1 tab daily  Indications: Edema  Patient taking differently: 40 mg. 1 tab daily  Indications: Edema, patient taking 60mg for the past 2 weeks 6/16/17  Yes Tatianna Regan MD   insulin lispro (HUMALOG) 100 unit/mL injection See sliding scale  Patient taking differently: See sliding scale  Indications: patient taking 5 units with meals 6/16/17  Yes Tatianna Regan MD   potassium chloride (K-DUR, KLOR-CON) 20 mEq tablet Take 1 Tab by mouth two (2) times a day. 6/16/17  Yes Tatianna Regan MD   folic acid (FOLVITE) 1 mg tablet Take 1 Tab by mouth daily.  5/25/17  Yes Syd Carrion MD   insulin glargine (LANTUS) 100 unit/mL injection 5 Units by SubCUTAneous route nightly. Indications: type 2 diabetes mellitus  Patient taking differently: 18 Units by SubCUTAneous route nightly. Indications: type 2 diabetes mellitus 5/25/17  Yes Wili Mayers MD   trimethoprim-sulfamethoxazole (BACTRIM DS, SEPTRA DS) 160-800 mg per tablet Take 1 Tab by mouth two (2) times a day. 5/24/18   Kayli Rock NP   baclofen (LIORESAL) 10 mg tablet 10 mg daily. 9/20/17   Historical Provider   Lactobacillus Acidoph & Bulgar (FLORANEX) 1 million cell tab tablet Take 1 Tab by mouth two (2) times a day. 6/16/17   Christa Nelson MD   gabapentin (NEURONTIN) 300 mg capsule Take 2 Caps by mouth two (2) times a day. Indications: NEUROPATHIC PAIN 6/16/17   Christa Nelson MD   famotidine (PEPCID) 20 mg tablet Take 1 Tab by mouth nightly. Patient taking differently: Take 20 mg by mouth daily as needed. 5/25/17   Wili Mayers MD   pravastatin (PRAVACHOL) 40 mg tablet Take 40 mg by mouth nightly. Indications: DYSLIPIDEMIA    Historical Provider       Allergies   Allergen Reactions    Vancomycin Itching    Ampicillin Itching    Bactrim [Sulfamethoxazole-Trimethoprim] Unknown (comments)    Blueberry Swelling     Causes throat swelling    Ciprofloxacin Itching    Codeine Other (comments)     Jumpy feeling    Crestor [Rosuvastatin] Itching    Darvocet A500 [Propoxyphene N-Acetaminophen] Itching    Demerol [Meperidine] Itching    Levaquin [Levofloxacin] Itching    Lipitor [Atorvastatin] Myalgia    Magnesium Oxide Itching     nausea    Minocin [Minocycline] Unknown (comments)    Pcn [Penicillins] Itching    Pravachol [Pravastatin] Swelling     Swelling in mouth.    Not allergic per patient, takes at home    Shellfish Derived Unknown (comments)    Sulfa (Sulfonamide Antibiotics) Itching    Ultracet [Tramadol-Acetaminophen] Itching    Vicodin [Hydrocodone-Acetaminophen] Unknown (comments)    Vytorin 10-10 [Ezetimibe-Simvastatin] Myalgia    Percodan [Oxycodone Hcl-Oxycodone-Asa] Itching       Review of Systems  A comprehensive review of systems was negative except for that written in the History of Present Illness. Physical Exam:      Visit Vitals    /57    Pulse 72    Temp 98.4 °F (36.9 °C)    Resp 12    Ht 5' 7\" (1.702 m)    Wt 107 kg (236 lb)    SpO2 97%    BMI 36.96 kg/m2       Physical Exam:    Gen: In general, this is a well nourished female in no acute distress  HEENT: Sclerae nonicteric. Oral mucous membranes moist. Dentition normal  Neck: Supple with midline trachea. CV: RRR without murmur or rub appreciated. Resp:Respirations are unlabored without use of accessory muscles. Lung fields bilaterally without wheezes or rhonchi. Abd: Soft, nontender, nondistended. Extrem: Extremities are warm, without cyanosis or clubbing. 1+ chronic pitting pretibial edema. R foot - heel wound has necrotic base with yellowish discharge   Skin: Warm, no visible rashes. Neuro: Patient is alert, oriented, and cooperative. No obvious focal defects. Moves all 4 extremities. Labs Reviewed:    Recent Results (from the past 24 hour(s))   METABOLIC PANEL, COMPREHENSIVE    Collection Time: 08/16/18  1:42 PM   Result Value Ref Range    Sodium 141 136 - 145 mmol/L    Potassium 3.8 3.5 - 5.5 mmol/L    Chloride 113 (H) 100 - 108 mmol/L    CO2 22 21 - 32 mmol/L    Anion gap 6 3.0 - 18 mmol/L    Glucose 276 (H) 74 - 99 mg/dL    BUN 20 (H) 7.0 - 18 MG/DL    Creatinine 0.91 0.6 - 1.3 MG/DL    BUN/Creatinine ratio 22 (H) 12 - 20      GFR est AA >60 >60 ml/min/1.73m2    GFR est non-AA >60 >60 ml/min/1.73m2    Calcium 6.0 (L) 8.5 - 10.1 MG/DL    Bilirubin, total 0.6 0.2 - 1.0 MG/DL    ALT (SGPT) 15 13 - 56 U/L    AST (SGOT) 39 (H) 15 - 37 U/L    Alk.  phosphatase 65 45 - 117 U/L    Protein, total 5.3 (L) 6.4 - 8.2 g/dL    Albumin 2.0 (L) 3.4 - 5.0 g/dL    Globulin 3.3 2.0 - 4.0 g/dL    A-G Ratio 0.6 (L) 0.8 - 1.7 CBC WITH AUTOMATED DIFF    Collection Time: 08/16/18  1:42 PM   Result Value Ref Range    WBC 7.3 4.6 - 13.2 K/uL    RBC 3.81 (L) 4.20 - 5.30 M/uL    HGB 10.8 (L) 12.0 - 16.0 g/dL    HCT 30.9 (L) 35.0 - 45.0 %    MCV 81.1 74.0 - 97.0 FL    MCH 28.3 24.0 - 34.0 PG    MCHC 35.0 31.0 - 37.0 g/dL    RDW 15.0 (H) 11.6 - 14.5 %    PLATELET 477 144 - 052 K/uL    MPV 10.3 9.2 - 11.8 FL    NEUTROPHILS 65 42 - 75 %    LYMPHOCYTES 28 20 - 51 %    MONOCYTES 4 2 - 9 %    EOSINOPHILS 3 0 - 5 %    BASOPHILS 0 0 - 3 %    ABS. NEUTROPHILS 4.8 1.8 - 8.0 K/UL    ABS. LYMPHOCYTES 2.0 0.8 - 3.5 K/UL    ABS. MONOCYTES 0.3 0 - 1.0 K/UL    ABS. EOSINOPHILS 0.2 0.0 - 0.4 K/UL    ABS. BASOPHILS 0.0 0.0 - 0.06 K/UL    DF MANUAL      PLATELET COMMENTS ADEQUATE PLATELETS      RBC COMMENTS ANISOCYTOSIS  1+       POC LACTIC ACID    Collection Time: 08/16/18  1:50 PM   Result Value Ref Range    Lactic Acid (POC) 0.9 0.4 - 2.0 mmol/L       Imaging Reviewed:    Xray R foot     IMPRESSION:     Soft tissue swelling along the dorsum of the foot. Severe osteopenia. No definite finding of osteomyelitis. If there is high clinical suspicion would  recommend follow-up nuclear isotope exam or MR. Rusty Irwin MD  8/16/2018, 3:08 PM

## 2018-08-17 ENCOUNTER — APPOINTMENT (OUTPATIENT)
Dept: CT IMAGING | Age: 68
DRG: 629 | End: 2018-08-17
Attending: PODIATRIST
Payer: COMMERCIAL

## 2018-08-17 LAB
ALBUMIN SERPL-MCNC: 2.5 G/DL (ref 3.4–5)
ALBUMIN/GLOB SERPL: 0.6 {RATIO} (ref 0.8–1.7)
ALP SERPL-CCNC: 83 U/L (ref 45–117)
ALT SERPL-CCNC: 17 U/L (ref 13–56)
ANION GAP SERPL CALC-SCNC: 8 MMOL/L (ref 3–18)
APTT PPP: 27.7 SEC (ref 23–36.4)
AST SERPL-CCNC: 14 U/L (ref 15–37)
BILIRUB SERPL-MCNC: 0.6 MG/DL (ref 0.2–1)
BUN SERPL-MCNC: 32 MG/DL (ref 7–18)
BUN/CREAT SERPL: 23 (ref 12–20)
CALCIUM SERPL-MCNC: 8.2 MG/DL (ref 8.5–10.1)
CHLORIDE SERPL-SCNC: 106 MMOL/L (ref 100–108)
CHOLEST SERPL-MCNC: 127 MG/DL
CO2 SERPL-SCNC: 28 MMOL/L (ref 21–32)
CREAT SERPL-MCNC: 1.39 MG/DL (ref 0.6–1.3)
ERYTHROCYTE [DISTWIDTH] IN BLOOD BY AUTOMATED COUNT: 14.8 % (ref 11.6–14.5)
GLOBULIN SER CALC-MCNC: 3.9 G/DL (ref 2–4)
GLUCOSE BLD STRIP.AUTO-MCNC: 158 MG/DL (ref 70–110)
GLUCOSE BLD STRIP.AUTO-MCNC: 166 MG/DL (ref 70–110)
GLUCOSE BLD STRIP.AUTO-MCNC: 255 MG/DL (ref 70–110)
GLUCOSE BLD STRIP.AUTO-MCNC: 258 MG/DL (ref 70–110)
GLUCOSE BLD STRIP.AUTO-MCNC: 291 MG/DL (ref 70–110)
GLUCOSE BLD STRIP.AUTO-MCNC: 429 MG/DL (ref 70–110)
GLUCOSE SERPL-MCNC: 204 MG/DL (ref 74–99)
HBA1C MFR BLD: 10.2 % (ref 4.2–5.6)
HCT VFR BLD AUTO: 30.6 % (ref 35–45)
HDLC SERPL-MCNC: 42 MG/DL (ref 40–60)
HDLC SERPL: 3 {RATIO} (ref 0–5)
HGB BLD-MCNC: 10.2 G/DL (ref 12–16)
LDLC SERPL CALC-MCNC: 61.6 MG/DL (ref 0–100)
LIPID PROFILE,FLP: NORMAL
MCH RBC QN AUTO: 27.7 PG (ref 24–34)
MCHC RBC AUTO-ENTMCNC: 33.3 G/DL (ref 31–37)
MCV RBC AUTO: 83.2 FL (ref 74–97)
PLATELET # BLD AUTO: 188 K/UL (ref 135–420)
PMV BLD AUTO: 9.4 FL (ref 9.2–11.8)
POTASSIUM SERPL-SCNC: 3.4 MMOL/L (ref 3.5–5.5)
PROT SERPL-MCNC: 6.4 G/DL (ref 6.4–8.2)
RBC # BLD AUTO: 3.68 M/UL (ref 4.2–5.3)
SODIUM SERPL-SCNC: 142 MMOL/L (ref 136–145)
TRIGL SERPL-MCNC: 117 MG/DL (ref ?–150)
VLDLC SERPL CALC-MCNC: 23.4 MG/DL
WBC # BLD AUTO: 6 K/UL (ref 4.6–13.2)

## 2018-08-17 PROCEDURE — 93005 ELECTROCARDIOGRAM TRACING: CPT

## 2018-08-17 PROCEDURE — 73700 CT LOWER EXTREMITY W/O DYE: CPT

## 2018-08-17 PROCEDURE — 80053 COMPREHEN METABOLIC PANEL: CPT | Performed by: HOSPITALIST

## 2018-08-17 PROCEDURE — 74011000250 HC RX REV CODE- 250: Performed by: PODIATRIST

## 2018-08-17 PROCEDURE — 74011250636 HC RX REV CODE- 250/636: Performed by: EMERGENCY MEDICINE

## 2018-08-17 PROCEDURE — 74011250637 HC RX REV CODE- 250/637: Performed by: INTERNAL MEDICINE

## 2018-08-17 PROCEDURE — 74011000258 HC RX REV CODE- 258: Performed by: EMERGENCY MEDICINE

## 2018-08-17 PROCEDURE — 65270000029 HC RM PRIVATE

## 2018-08-17 PROCEDURE — 74011250636 HC RX REV CODE- 250/636: Performed by: HOSPITALIST

## 2018-08-17 PROCEDURE — 80061 LIPID PANEL: CPT | Performed by: HOSPITALIST

## 2018-08-17 PROCEDURE — 83036 HEMOGLOBIN GLYCOSYLATED A1C: CPT | Performed by: INTERNAL MEDICINE

## 2018-08-17 PROCEDURE — 85027 COMPLETE CBC AUTOMATED: CPT | Performed by: HOSPITALIST

## 2018-08-17 PROCEDURE — 77010033678 HC OXYGEN DAILY

## 2018-08-17 PROCEDURE — 74011636637 HC RX REV CODE- 636/637: Performed by: INTERNAL MEDICINE

## 2018-08-17 PROCEDURE — 85730 THROMBOPLASTIN TIME PARTIAL: CPT | Performed by: HOSPITALIST

## 2018-08-17 PROCEDURE — 74011636637 HC RX REV CODE- 636/637: Performed by: HOSPITALIST

## 2018-08-17 PROCEDURE — 74011250637 HC RX REV CODE- 250/637: Performed by: HOSPITALIST

## 2018-08-17 PROCEDURE — 36415 COLL VENOUS BLD VENIPUNCTURE: CPT | Performed by: HOSPITALIST

## 2018-08-17 PROCEDURE — 82962 GLUCOSE BLOOD TEST: CPT

## 2018-08-17 RX ADMIN — GABAPENTIN 600 MG: 300 CAPSULE ORAL at 09:24

## 2018-08-17 RX ADMIN — INSULIN LISPRO 3 UNITS: 100 INJECTION, SOLUTION INTRAVENOUS; SUBCUTANEOUS at 22:40

## 2018-08-17 RX ADMIN — INSULIN LISPRO 9 UNITS: 100 INJECTION, SOLUTION INTRAVENOUS; SUBCUTANEOUS at 08:35

## 2018-08-17 RX ADMIN — FUROSEMIDE 40 MG: 40 TABLET ORAL at 08:45

## 2018-08-17 RX ADMIN — INSULIN LISPRO 9 UNITS: 100 INJECTION, SOLUTION INTRAVENOUS; SUBCUTANEOUS at 12:22

## 2018-08-17 RX ADMIN — FERROUS SULFATE TAB 325 MG (65 MG ELEMENTAL FE) 325 MG: 325 (65 FE) TAB at 17:32

## 2018-08-17 RX ADMIN — HEPARIN SODIUM 5000 UNITS: 5000 INJECTION, SOLUTION INTRAVENOUS; SUBCUTANEOUS at 17:36

## 2018-08-17 RX ADMIN — DAKIN'S SOLUTION 0.125% (QUARTER STRENGTH): 0.12 SOLUTION at 17:45

## 2018-08-17 RX ADMIN — AZTREONAM 2 G: 2 INJECTION, POWDER, LYOPHILIZED, FOR SOLUTION INTRAMUSCULAR; INTRAVENOUS at 17:50

## 2018-08-17 RX ADMIN — INSULIN GLARGINE 10 UNITS: 100 INJECTION, SOLUTION SUBCUTANEOUS at 22:39

## 2018-08-17 RX ADMIN — DIPHENHYDRAMINE HYDROCHLORIDE 12.5 MG: 25 SOLUTION ORAL at 09:11

## 2018-08-17 RX ADMIN — INSULIN LISPRO 5 UNITS: 100 INJECTION, SOLUTION INTRAVENOUS; SUBCUTANEOUS at 12:22

## 2018-08-17 RX ADMIN — SODIUM CHLORIDE 75 ML/HR: 900 INJECTION, SOLUTION INTRAVENOUS at 15:10

## 2018-08-17 RX ADMIN — BACLOFEN 10 MG: 10 TABLET ORAL at 08:45

## 2018-08-17 RX ADMIN — FERROUS SULFATE TAB 325 MG (65 MG ELEMENTAL FE) 325 MG: 325 (65 FE) TAB at 09:21

## 2018-08-17 RX ADMIN — CARVEDILOL 6.25 MG: 6.25 TABLET, FILM COATED ORAL at 17:32

## 2018-08-17 RX ADMIN — INSULIN LISPRO 3 UNITS: 100 INJECTION, SOLUTION INTRAVENOUS; SUBCUTANEOUS at 17:30

## 2018-08-17 RX ADMIN — PRAVASTATIN SODIUM 40 MG: 20 TABLET ORAL at 22:36

## 2018-08-17 RX ADMIN — INSULIN LISPRO 5 UNITS: 100 INJECTION, SOLUTION INTRAVENOUS; SUBCUTANEOUS at 17:29

## 2018-08-17 RX ADMIN — HEPARIN SODIUM 5000 UNITS: 5000 INJECTION, SOLUTION INTRAVENOUS; SUBCUTANEOUS at 09:17

## 2018-08-17 RX ADMIN — CARVEDILOL 6.25 MG: 6.25 TABLET, FILM COATED ORAL at 08:46

## 2018-08-17 RX ADMIN — METRONIDAZOLE 500 MG: 500 INJECTION, SOLUTION INTRAVENOUS at 11:46

## 2018-08-17 RX ADMIN — GABAPENTIN 600 MG: 300 CAPSULE ORAL at 17:32

## 2018-08-17 RX ADMIN — HEPARIN SODIUM 5000 UNITS: 5000 INJECTION, SOLUTION INTRAVENOUS; SUBCUTANEOUS at 02:40

## 2018-08-17 RX ADMIN — FOLIC ACID 1 MG: 1 TABLET ORAL at 08:45

## 2018-08-17 RX ADMIN — ACETAMINOPHEN 650 MG: 325 TABLET, FILM COATED ORAL at 22:35

## 2018-08-17 RX ADMIN — DIPHENHYDRAMINE HYDROCHLORIDE 12.5 MG: 25 SOLUTION ORAL at 17:50

## 2018-08-17 RX ADMIN — INSULIN LISPRO 5 UNITS: 100 INJECTION, SOLUTION INTRAVENOUS; SUBCUTANEOUS at 08:33

## 2018-08-17 RX ADMIN — ACETAMINOPHEN 650 MG: 325 TABLET, FILM COATED ORAL at 06:09

## 2018-08-17 RX ADMIN — VANCOMYCIN HYDROCHLORIDE 1750 MG: 10 INJECTION, POWDER, LYOPHILIZED, FOR SOLUTION INTRAVENOUS at 09:15

## 2018-08-17 RX ADMIN — METRONIDAZOLE 500 MG: 500 INJECTION, SOLUTION INTRAVENOUS at 22:39

## 2018-08-17 RX ADMIN — AZTREONAM 2 G: 2 INJECTION, POWDER, LYOPHILIZED, FOR SOLUTION INTRAMUSCULAR; INTRAVENOUS at 02:42

## 2018-08-17 RX ADMIN — AZTREONAM 2 G: 2 INJECTION, POWDER, LYOPHILIZED, FOR SOLUTION INTRAMUSCULAR; INTRAVENOUS at 11:06

## 2018-08-17 NOTE — PROGRESS NOTES
Care Management Interventions  PCP Verified by CM: Yes  Mode of Transport at Discharge: Other (see comment)  Transition of Care Consult (CM Consult): Discharge Planning  Current Support Network: Lives with Spouse  Confirm Follow Up Transport: Family  Plan discussed with Pt/Family/Caregiver: Yes    Reason for Admission:   osteomyelitis               RRAT Score:     26             Resources/supports as identified by patient/family:   Cass Hicks, Medicare part A&B                Top Challenges facing patient (as identified by patient/family and CM): Finances/Medication cost? no                   Transportation? Depends on  and HRT               Support system or lack thereof?  family                     Living arrangements? Lives with her            Self-care/ADLs/Cognition? AOx4,  Self care          Current Advanced Directive/Advance Care Plan:                            Plan for utilizing home health:                          Likelihood of readmission: red/high                 Transition of Care Plan:   Pt states she is wheelchair bound and has power wheelchair, sliding board and also uses CPAP at home. She states her  takes her to her appointments and sometimes HRT bus picks her up. She states she does not have any case management needs at this time. CM will continue to follow and assist with any discharge needs.

## 2018-08-17 NOTE — ROUTINE PROCESS
Bedside and Verbal shift change report given to [facility] (oncoming nurse) by Sarabjit Agudelo RN (offgoing nurse). Report included the following information SBAR, Kardex, MAR and Recent Results. SITUATION:  Code Status: Full Code  Reason for Admission: Osteomyelitis ([facility] [address]  Foot osteomyelitis, right [facility])  Hospital day: 1  Problem List:       Hospital Problems  Date Reviewed: 8/17/2018          Codes Class Noted POA    Osteomyelitis ([facility] [address] ICD-10-CM: M86.9  ICD-9-CM: 730.20  8/16/2018 Unknown        Foot osteomyelitis, right ([facility] [address] ICD-10-CM: M86.9  ICD-9-CM: 730.27  8/16/2018 Unknown              BACKGROUND:   Past Medical History:   Past Medical History:   Diagnosis Date    Acute paraplegia ([facility] [address] 4/20/2017    Benign hypertensive heart disease with systolic CHF, NYHA class 2 ([facility] [address] 9/5/2012    Biventricular implantable cardioverter-defibrillator in situ 04/28/2005    Upgraded to BiV AICD; gen change 4/2008; pocket revision 10/2009; Abdominal - done on 8/22/2012 by Dr. Jason Metzger Cardiac cath 08/15/1996    Patent coronaries. Elev LVEDP. EF 50-55%.  Cardiac echocardiogram 06/23/2015    Ltd study. EF 45-50%. Mild, diffuse hypk. Severe apical hypk. No mass or thrombus was clearly identified, although imaging was suboptimal.      Cardiac nuclear imaging test 06/19/2015    Fixed distal apical, distal septal defect more likely due to RV pacing than prior infarct. No ischemia. EF 46%. RWMA c/w RV pacing. Nondiagnostic EKG on pharm stress test.      Cardiovascular lower extremity venous duplex 09/04/2012    Acute, non-occlusive DVT in CFV on right. No DVT on left. No superficial thrombosis bilaterally.  Cardiovascular upper extremity venous duplex 08/27/2012    DVT in axillary vein on left. Left subclavian was not visualized.     Chronic anemia 9/5/2012    Chronic systolic heart failure (HCC)     Decreased calculated glomerular filtration rate (GFR) 3/30/2017    Calculated GFR equivalent to that of CKD stage 3 = 30-59 ml/min    Diabetic neuropathy associated with type 2 diabetes mellitus ([facility] [address] 6/28/2011    Difficult airway for intubation 08/22/2012    see anesthesia airway note    Dyslipidemia 6/28/2011    Gout     History of complete heart block 6/28/2011    History of Coumadin therapy     Anticoagulation for DVT of the LUE; Discontinued on 3/30/2017    History of deep venous thrombosis 9/5/2012    Left upper extremity    History of pyelonephritis 3/30/2017    Left bundle branch block (LBBB) on electrocardiogram 6/28/2011    Nonischemic cardiomyopathy ([address] 6/28/2011    Obesity (BMI 35.0-39.9 without comorbidity) 3/13/2017    Obstructive sleep apnea on CPAP 2/7/2012    Psoas abscess, right ([address] 4/20/2017    Psoas hematoma, right, secondary to anticoagulant therapy 3/30/2017    Type 2 diabetes mellitus with diabetic neuropathy ([facility] [address] 6/28/2011      Patient taking anticoagulants yes    Patient has a defibrillator: yes   History of shots YES for example, flu, pneumonia, tetanus   Isolation History NO for example, MRSA, CDiff    ASSESSMENT:  Changes in Assessment Throughout Shift: none  Significant Changes in 24 hours (for example, RR/code, fall)  Patient has Central Line: no   Patient has Krueger Cath: yes Reasons if yes: Chronic urinary retention. Patient is paraplegic.    Mobility Issues  PT  IV Patency  OR Checklist  Pending Tests    Last Vitals:  Vitals w/ MEWS Score (last day)     Date/Time MEWS Score Pulse Resp Temp BP Level of Consciousness SpO2    08/17/18 1526 1 73 18 98.6 °F (37 °C) 112/61 Alert 100 %    08/17/18 1118 1 66 19 97.5 °F (36.4 °C) 101/60 Alert 100 %    08/17/18 0806 1 70 18 97.5 °F (36.4 °C) 124/74 Alert 100 %    08/17/18 0357 1 72 18 98 °F (36.7 °C) 112/59 Alert 100 %    08/16/18 2347 1 80 18 97.5 °F (36.4 °C) 110/56 Alert 99 %    08/16/18 2031 1 71 18 97 °F (36.1 °C) 127/66 Alert --    08/16/18 1515 -- 72 12 -- 133/57 -- 97 %    08/16/18 1500 -- 74 19 -- 135/56 -- 99 %    08/16/18 1445 -- 75 14 -- 145/65 -- 99 %    08/16/18 1430 -- 83 21 -- 157/76 -- 99 %    08/16/18 1400 -- 68 10 -- -- -- 94 %    08/16/18 1345 -- -- -- -- -- -- 100 %    08/16/18 1330 -- 69 -- -- -- -- 99 %    08/16/18 1315 -- 65 -- -- -- -- 99 %    08/16/18 1300 -- 69 -- -- -- -- 100 %    08/16/18 1245 -- 67 -- -- -- -- 97 %    08/16/18 1230 -- 66 -- -- -- -- 98 %    08/16/18 1215 -- 69 -- -- -- -- 99 %    08/16/18 1200 -- 67 -- -- -- -- 97 %    08/16/18 11:48:43 -- -- -- -- -- -- 98 %    08/16/18 1145 -- 68 -- -- -- -- 98 %    08/16/18 1004 1 75 20 98.4 °F (36.9 °C) 169/85 Alert 98 %            PAIN    Pain Assessment    Pain Intensity 1: 0 (08/17/18 0756)    Pain Location 1: Foot    Pain Intervention(s) 1: Medication (see MAR) (Pt states Tylenol works just fine, refuses stronger pain med)    Patient Stated Pain Goal: 0  Intervention effective: yes  Time of last intervention: 0609 Reassessment Completed: yes   Other actions taken for pain: Distraction    Last 3 Weights:  Last 3 Recorded Weights in this Encounter    08/16/18 1004   Weight: 107 kg (236 lb)   Weight change:     INTAKE/OUPUT    Current Shift:      Last three shifts: 08/16 0701 - 08/17 1900  In: 3043.8 [P.O.:720; I.V.:2323.8]  Out: 2000 [Urine:2000]    RECOMMENDATIONS AND DISCHARGE PLANNING  Patient needs and requests: Wound Care    Pending tests/procedures: labs     Discharge plan for patient: Home    Discharge planning Needs or Barriers: none    Estimated Discharge Date: 8/23/2018 Posted on Whiteboard in Patients Room: yes       \"HEALS\" SAFETY CHECK  A safety check occurred in the patient's room between off going nurse and oncoming nurse listed above. The safety check included the below items:    H  High Alert Medications Verify all high alert medication drips (heparin, PCA, etc.)  E  Equipment Suction is set up for ALL patients (with yanker)  Red plugs utilized for all equipment (IV pumps, etc.)  WOWs wiped down at end of shift.   Room stocked with oxygen, suction, and other unit-specific supplies  A  Alarms Bed alarm is set for fall risk patients  Ensure chair alarm is in place and activated if patient is up in a chair  L  Lines Check IV for any infiltration  Krueger bag is empty if patient has a Krueger   Tubing and IV bags are labeled  S  Safety  Room is clean, patient is clean, and equipment is clean. Hallways are clear from equipment besides carts. Fall bracelet on for fall risk patients  Ensure room is clear and free of clutter  Suction is set up for ALL patients (with vania)  Hallways are clear from equipment besides carts.    Isolation precautions followed, supplies available outside room, sign posted    Katie Pinedo RN

## 2018-08-17 NOTE — CONSULTS
Consult    Patient: Kerrie Amaya MRN: [id number]  SSN: [id number]    YOB: 1950  Age: 76 y.o. Sex: female      Subjective:      Kerrie Amaya is a 76 y.o. female who is being seen for asked to evaluate and treat ulcer and infection right heel. .    Past Medical History:   Diagnosis Date    [facility] 4/20/2017    Benign hypertensive heart disease with systolic CHF, NYHA class 2 ([facility] 9/5/2012    Biventricular implantable cardioverter-defibrillator in situ 04/28/2005    Upgraded to BiV AICD; gen change 4/2008; pocket revision 10/2009; Abdominal - done on 8/22/2012 by Dr. Long Arroyo Cardiac cath 08/15/1996    Patent coronaries. Elev LVEDP. EF 50-55%.  Cardiac echocardiogram 06/23/2015    Ltd study. EF 45-50%. Mild, diffuse hypk. Severe apical hypk. No mass or thrombus was clearly identified, although imaging was suboptimal.      Cardiac nuclear imaging test 06/19/2015    Fixed distal apical, distal septal defect more likely due to RV pacing than prior infarct. No ischemia. EF 46%. RWMA c/w RV pacing. Nondiagnostic EKG on pharm stress test.      Cardiovascular lower extremity venous duplex 09/04/2012    Acute, non-occlusive DVT in CFV on right. No DVT on left. No superficial thrombosis bilaterally.  Cardiovascular upper extremity venous duplex 08/27/2012    DVT in axillary vein on left. Left subclavian was not visualized.     Chronic anemia 9/5/2012    Chronic systolic heart failure (HCC)     Decreased calculated glomerular filtration rate (GFR) 3/30/2017    Calculated GFR equivalent to that of CKD stage 3 = 30-59 ml/min    Diabetic neuropathy associated with type 2 diabetes mellitus ([facility] 6/28/2011    Difficult airway for intubation 08/22/2012    see anesthesia airway note    Dyslipidemia 6/28/2011    Gout     History of complete heart block 6/28/2011    History of Coumadin therapy     Anticoagulation for DVT of the LUE; Discontinued on 3/30/2017    History of deep venous thrombosis 9/5/2012    Left upper extremity    History of pyelonephritis 3/30/2017    Left bundle branch block (LBBB) on electrocardiogram 6/28/2011    [facility]ische[facility] ([facility] 6/28/2011    Obesity (BMI 35.0-39.9 without comorbidity) 3/13/2017    Obstructive sleep apnea on CPAP 2/7/2012    Psoas abscess, right ([facility] 4/20/2017    Psoas hematoma, right, secondary to anticoagulant therapy 3/30/2017    Type 2 diabetes mellitus with diabetic neuropathy ([facility] 6/28/2011     Past Surgical History:   Procedure Laterality Date    HX CARPAL TUNNEL RELEASE  4/07    right     HX CHOLECYSTECTOMY  1994    HX HYSTERECTOMY  1973    HX OTHER SURGICAL  6/11/2012    AICD revision    HX PACEMAKER  4/28/2005    [employer] AICD      Family History   Problem Relation Age of Onset    Cancer Father      Leukemia     Social History   Substance Use Topics    Smoking status: Never Smoker    Smokeless tobacco: Never Used    Alcohol use No      Current Facility-Administered Medications   Medication Dose Route Frequency Provider Last Rate Last Dose    [START ON 8/18/2018] vancomycin (VANCOCIN) 1,250 mg in 0.9% sodium chloride 250 mL IVPB  1,250 mg IntraVENous Q24H Flavio Julien MD        [START ON 8/18/2018] VANCOMYCIN TROUGH DUE    Other Daily Flavio Julien MD        sodium hypochlorite (QUARTER STRENGTH DAKIN'S) 0.125% irrigation (bottle)   Topical BID Skip Ishaan, DPLONDON        aztreonam (AZACTAM) 2 g in 0.9% sodium chloride (MBP/ADV) 100 mL MBP  2 g IntraVENous Q8H Flavio Julien  mL/hr at 08/17/18 1106 2 g at 08/17/18 1106    metroNIDAZOLE (FLAGYL) IVPB premix 500 mg  500 mg IntraVENous Q12H Flavio Julien  mL/hr at 08/17/18 1146 500 mg at 08/17/18 1146    carvedilol (COREG) tablet 6.25 mg  6.25 mg Oral BID WITH MEALS Swapnil Aaron MD   6.25 mg at 08/17/18 0846    baclofen (LIORESAL) tablet 10 mg  10 mg Oral DAILY Swapnil Aaron MD   10 mg at 08/17/18 0845    famotidine (PEPCID) tablet 20 mg  20 mg Oral QHS Sandra Ramos MD        ferrous sulfate tablet 325 mg  325 mg Oral BID WITH MEALS Sandra Ramos MD   325 mg at 85/37/08 7350    folic acid (FOLVITE) tablet 1 mg  1 mg Oral DAILY Sandra Ramos MD   1 mg at 08/17/18 0845    furosemide (LASIX) tablet 40 mg  40 mg Oral DAILY Sandra Ramos MD   40 mg at 08/17/18 0845    gabapentin (NEURONTIN) capsule 600 mg  600 mg Oral BID Sandra Ramos MD   600 mg at 08/17/18 0924    insulin lispro (HUMALOG) injection 5 Units  5 Units SubCUTAneous TIDAC Sandra Ramos MD   5 Units at 08/17/18 1222    pravastatin (PRAVACHOL) tablet 40 mg  40 mg Oral QHS Sandra Ramos MD   40 mg at 08/16/18 2235    ondansetron (ZOFRAN) injection 4 mg  4 mg IntraVENous Q6H PRN Sandra Ramos MD        0.9% sodium chloride infusion  75 mL/hr IntraVENous CONTINUOUS Sandra Ramos MD 75 mL/hr at 08/16/18 1732 75 mL/hr at 08/16/18 1732    heparin (porcine) injection 5,000 Units  5,000 Units SubCUTAneous Q8H Sandra Ramos MD   5,000 Units at 08/17/18 0917    acetaminophen (TYLENOL) tablet 650 mg  650 mg Oral Q6H PRN Sandra Ramos MD   650 mg at 08/17/18 8687    glucose chewable tablet 16 g  4 Tab Oral PRN Sandra Ramos MD        glucagon (GLUCAGEN) injection 1 mg  1 mg IntraMUSCular PRN Sandra Ramos MD        dextrose (D50W) injection syrg 12.5-25 g  25-50 mL IntraVENous PRN Sandra Ramos MD        insulin lispro (HUMALOG) injection   SubCUTAneous AC&HS Billy Rajput MD   9 Units at 08/17/18 1222    insulin glargine (LANTUS) injection 10 Units  10 Units SubCUTAneous QHS Billy Rajput MD   10 Units at 08/16/18 2325    diphenhydrAMINE (BENADRYL) 12.5 mg/5 mL oral elixir 12.5 mg  12.5 mg Oral Q6H PRN Billy Rajput MD   12.5 mg at 08/17/18 0911        Allergies   Allergen Reactions    Vancomycin Itching    Ampicillin Itching    Bactrim [Sulfamethoxazole-Trimethoprim] Unknown (comments)    Blueberry Swelling     Causes throat swelling    Ciprofloxacin Itching    Codeine Other (comments)     Jumpy feeling    Crestor [Rosuvastatin] Itching    Darvocet A500 [Propoxyphene N-Acetaminophen] Itching    Demerol [Meperidine] Itching    Levaquin [Levofloxacin] Itching    Lipitor [Atorvastatin] Myalgia    Magnesium Oxide Itching     nausea    Minocin [Minocycline] Unknown (comments)    Pcn [Penicillins] Itching    Pravachol [Pravastatin] Swelling     Swelling in mouth. Not allergic per patient, takes at home    Shellfish Derived Unknown (comments)    Sulfa (Sulfonamide Antibiotics) Itching    Ultracet [Tramadol-Acetaminophen] Itching    Vicodin [Hydrocodone-Acetaminophen] Unknown (comments)    Vytorin 10-10 [Ezetimibe-Simvastatin] Myalgia    Percodan [Oxycodone Hcl-Oxycodone-Asa] Itching       Review of Systems:  A comprehensive review of systems was negative except for that written in the History of Present Illness. Objective:     Vitals:    08/16/18 2347 08/17/18 0357 08/17/18 0806 08/17/18 1118   BP: 110/56 112/59 124/74 101/60   Pulse: 80 72 70 66   Resp: 18 18 18 19   Temp: 97.5 °F (36.4 °C) 98 °F (36.7 °C) 97.5 °F (36.4 °C) 97.5 °F (36.4 °C)   SpO2: 99% 100% 100% 100%   Weight:       Height:            Physical Exam:  Seen at bedside awake and alert. Inspected right heel. Has posterior ulcer,70% granular,  Slough tissue, exposed lytic bone. No pus. Clear drainage,smells. X-ray shows diffuse osteopenia , no gross lytic process. Assessment:     Hospital Problems  Date Reviewed: 8/17/2018          Codes Class Noted POA    Osteomyelitis ([facility] ICD-10-CM: M86.9  ICD-9-CM: 730.20  8/16/2018 Unknown        Foot osteomyelitis, right ([facility] ICD-10-CM: M86.9  ICD-9-CM: 730.27  8/16/2018 Unknown              Plan:     Infect ulcer right heel. Osteitis. CT scan. Wound care. Needs debridement and wound vac. Planned over weekend or Monday.     Signed By: Candelaria Jacskon DPM     August 17, 2018

## 2018-08-17 NOTE — ROUTINE PROCESS
Bedside and Verbal shift change report given to DANIEL Woodson (oncoming nurse) by Valeria Gordon RN (offgoing nurse). Report included the following information SBAR, Kardex, MAR and Recent Results. SITUATION:  Code Status: Full Code  Reason for Admission: Osteomyelitis ([facility]  Foot osteomyelitis, right [facility])  Hospital day: 1  Problem List:       Hospital Problems  Date Reviewed: 7/19/2018          Codes Class Noted POA    Osteomyelitis ([facility] ICD-10-CM: M86.9  ICD-9-CM: 730.20  8/16/2018 Unknown        Foot osteomyelitis, right ([address] ICD-10-CM: M86.9  ICD-9-CM: 730.27  8/16/2018 Unknown              BACKGROUND:   Past Medical History:   Past Medical History:   Diagnosis Date    Acute paraplegia ([facility] 4/20/2017    Benign hypertensive heart disease with systolic CHF, NYHA class 2 ([address] 9/5/2012    Biventricular implantable cardioverter-defibrillator in situ 04/28/2005    Upgraded to BiV AICD; gen change 4/2008; pocket revision 10/2009; Abdominal - done on 8/22/2012 by Dr. Ck Holley Cardiac cath 08/15/1996    Patent coronaries. Elev LVEDP. EF 50-55%.  Cardiac echocardiogram 06/23/2015    Ltd study. EF 45-50%. Mild, diffuse hypk. Severe apical hypk. No mass or thrombus was clearly identified, although imaging was suboptimal.      Cardiac nuclear imaging test 06/19/2015    Fixed distal apical, distal septal defect more likely due to RV pacing than prior infarct. No ischemia. EF 46%. RWMA c/w RV pacing. Nondiagnostic EKG on pharm stress test.      Cardiovascular lower extremity venous duplex 09/04/2012    Acute, non-occlusive DVT in CFV on right. No DVT on left. No superficial thrombosis bilaterally.  Cardiovascular upper extremity venous duplex 08/27/2012    DVT in axillary vein on left. Left subclavian was not visualized.     Chronic anemia 9/5/2012    Chronic systolic heart failure (HCC)     Decreased calculated glomerular filtration rate (GFR) 3/30/2017    Calculated GFR equivalent to that of CKD stage 3 = 30-59 ml/min    Diabetic neuropathy associated with type 2 diabetes mellitus ([facility] 6/28/2011    Difficult airway for intubation 08/22/2012    see anesthesia airway note    Dyslipidemia 6/28/2011    Gout     History of complete heart block 6/28/2011    History of Coumadin therapy     Anticoagulation for DVT of the LUE; Discontinued on 3/30/2017    History of deep venous thrombosis 9/5/2012    Left upper extremity    History of pyelonephritis 3/30/2017    Left bundle branch block (LBBB) on electrocardiogram 6/28/2011    Nonischemic cardiomyopathy ([facility] 6/28/2011    Obesity (BMI 35.0-39.9 without comorbidity) 3/13/2017    Obstructive sleep apnea on CPAP 2/7/2012    Psoas abscess, right ([facility] 4/20/2017    Psoas hematoma, right, secondary to anticoagulant therapy 3/30/2017    Type 2 diabetes mellitus with diabetic neuropathy ([facility] 6/28/2011      Patient taking anticoagulants yes    Patient has a defibrillator: yes   History of shots YES for example, flu, pneumonia, tetanus   Isolation History NO for example, MRSA, CDiff    ASSESSMENT:  Changes in Assessment Throughout Shift: none  Significant Changes in 24 hours (for example, RR/code, fall)  Patient has Central Line: no   Patient has Krueger Cath: yes Reasons if yes: Chronic urinary retention. Patient is paraplegic.    Mobility Issues  PT  IV Patency  OR Checklist  Pending Tests    Last Vitals:  Vitals w/ MEWS Score (last day)     Date/Time MEWS Score Pulse Resp Temp BP Level of Consciousness SpO2    08/16/18 2347 1 80 18 97.5 °F (36.4 °C) 110/56 Alert 99 %    08/16/18 2031 1 71 18 97 °F (36.1 °C) 127/66 Alert --    08/16/18 1515 -- 72 12 -- 133/57 -- 97 %    08/16/18 1500 -- 74 19 -- 135/56 -- 99 %    08/16/18 1445 -- 75 14 -- 145/65 -- 99 %    08/16/18 1430 -- 83 21 -- 157/76 -- 99 %    08/16/18 1400 -- 68 10 -- -- -- 94 %    08/16/18 1345 -- -- -- -- -- -- 100 %    08/16/18 1330 -- 69 -- -- -- -- 99 %    08/16/18 1315 -- 65 -- -- -- -- 99 %    08/16/18 1300 -- 69 -- -- -- -- 100 %    08/16/18 1245 -- 67 -- -- -- -- 97 %    08/16/18 1230 -- 66 -- -- -- -- 98 %    08/16/18 1215 -- 69 -- -- -- -- 99 %    08/16/18 1200 -- 67 -- -- -- -- 97 %    08/16/18 11:48:43 -- -- -- -- -- -- 98 %    08/16/18 1145 -- 68 -- -- -- -- 98 %    08/16/18 1004 1 75 20 98.4 °F (36.9 °C) 169/85 Alert 98 %            PAIN    Pain Assessment    Pain Intensity 1: 7 (08/16/18 1004)    Pain Location 1:  (R heel)         Patient Stated Pain Goal: 0  Intervention effective: yes  Time of last intervention: 0609 Reassessment Completed: yes   Other actions taken for pain: Distraction    Last 3 Weights:  Last 3 Recorded Weights in this Encounter    08/16/18 1004   Weight: 107 kg (236 lb)   Weight change:     INTAKE/OUPUT    Current Shift:      Last three shifts:      RECOMMENDATIONS AND DISCHARGE PLANNING  Patient needs and requests: Wound Care    Pending tests/procedures: labs     Discharge plan for patient: Home    Discharge planning Needs or Barriers: none    Estimated Discharge Date: 8/23/2018 Posted on Whiteboard in Patients Room: yes       \"HEALS\" SAFETY CHECK  A safety check occurred in the patient's room between off going nurse and oncoming nurse listed above. The safety check included the below items:    H  High Alert Medications Verify all high alert medication drips (heparin, PCA, etc.)  E  Equipment Suction is set up for ALL patients (with yanker)  Red plugs utilized for all equipment (IV pumps, etc.)  WOWs wiped down at end of shift. Room stocked with oxygen, suction, and other unit-specific supplies  A  Alarms Bed alarm is set for fall risk patients  Ensure chair alarm is in place and activated if patient is up in a chair  L  Lines Check IV for any infiltration  Krueger bag is empty if patient has a Krueger   Tubing and IV bags are labeled  S  Safety  Room is clean, patient is clean, and equipment is clean. Hallways are clear from equipment besides carts.    Fall bracelet on for fall risk patients  Ensure room is clear and free of clutter  Suction is set up for ALL patients (with vania)  Hallways are clear from equipment besides carts.    Isolation precautions followed, supplies available outside room, sign posted    Idania Mena RN

## 2018-08-17 NOTE — ROUTINE PROCESS
Blood Glucose checked by JULIAN Shin at 2228 = 413 mg/dl, rechecked at 2231= 429 mg/dl but results have not uploaded on patient's chart yet. Dr. Dawna Mcmillan was informed. New order was received to change Lantus to 10 units at bedtime. Patient c/o itching on her left arm and soreness on her tongue (She is not sure whether it is caused bu her sucking on ice chips.), and states that every time she receives antibiotic that she is also given Benadryl due to allergies to so many antibiotics and other medications. No hives, rashes or tongue swelling noted. Applied moisturizing lotion and she verbalized relief of itching but states her tongue is still sore. Dr. Dawna Mcmillan was informed. New order for Benadryl was received.

## 2018-08-17 NOTE — WOUND CARE
Physical Exam   Room 460: pt to be followed by Dr. Gil Neal for podiatry services.   Italia ISAACSN, RN, [facility], [address]

## 2018-08-17 NOTE — DIABETES MGMT
NUTRITIONAL ASSESSMENT GLYCEMIC CONTROL/ PLAN OF CARE     Kiana Gimenez           76 y.o.           8/16/2018               No diagnosis found. INTERVENTIONS/PLAN:   Consider increasing Lantus insulin to 15 units nightly  Diabetes education   ASSESSMENT:   Pt is a 76year old female with a past medical history significant for type 2 diabetes, hypertension, CHF, CAD, and paraplegia who presented with worsening wound on right heel. Blood glucose elevated above targets. Pt has order for basal, prandial, and correctional insulin coverage. Diabetic diet restriction added to cardiac diet order. Pt reports trying to eat healthy and limit her portions Pt had glucometer and reports regularly checking glucose at home. No problems with hypoglycemia. Pt states she would be interested in free outpatient diabetes education classes, information provided.      Diabetes Management:   Recent blood glucose:  6/22/2017 07:39 6/22/2017 12:21 8/16/2018 22:31 8/17/2018 01:00 8/17/2018 08:30   150 (H) 227 (H) 429 (HH) 291 (H) 258 (H)      Within target range (non-ICU: <140; ICU<180): [] Yes   [x]  No    Current Insulin regimen:   Lantus insulin 10 units every bedtime  Prandial Lispro insulin 5 units TID with meals  Correctional Lispro insulin 4 times daily ACHS (very resistant scale)  Home medication/insulin regimen:    Lantus insulin 18 units nightly   Lispro insulin 5 units with meals  Januvia   HbA1c: 10.2% (estimated average glucose of 246 mg/dL)  Adequate glycemic control PTA:  [] Yes  [x] No     SUBJECTIVE/OBJECTIVE:   Information obtained from: patient, chart review     Diet: Cardiac, consistent carbohydrate 1800 Kcal    Medications: [x] Reviewed     Most Recent POC Glucose:   Recent Labs      08/17/18   0240  08/16/18   1342   GLU  204*  276*      Labs:   Lab Results   Component Value Date/Time    Hemoglobin A1c 10.2 (H) 08/17/2018 02:40 AM     Lab Results   Component Value Date/Time    Sodium 142 08/17/2018 02:40 AM    Potassium 3.4 (L) 08/17/2018 02:40 AM    Chloride 106 08/17/2018 02:40 AM    CO2 28 08/17/2018 02:40 AM    Anion gap 8 08/17/2018 02:40 AM    Glucose 204 (H) 08/17/2018 02:40 AM    BUN 32 (H) 08/17/2018 02:40 AM    Creatinine 1.39 (H) 08/17/2018 02:40 AM    Calcium 8.2 (L) 08/17/2018 02:40 AM    Magnesium 1.7 06/15/2017 08:35 PM    Phosphorus 4.0 05/15/2017 06:04 AM    Albumin 2.5 (L) 08/17/2018 02:40 AM     Anthropometrics: BMI (calculated): 37  Wt Readings from Last 1 Encounters:   08/16/18 107 kg (236 lb)      Ht Readings from Last 1 Encounters:   08/16/18 5' 7\" (1.702 m)     Estimated Nutrition Needs:  8800-9616 Kcal/day,  grams protein/day   Based on:   [x]   Actual BW    []   IBW   []  Adjusted BW      Nutrition Diagnoses:    Altered nutrition related lab value related to diabetes as evidenced by Hemoglobin A1c of 10.2%   Nutrition Interventions: diabetes education, coordination of care   Goal: Blood glucose will be within target range of  mg/dL by 8/20/18    Nutrition Monitoring and Evaluation    []     Monitor po intake on meal rounds  [x]     Continue inpatient monitoring and intervention  []     Other:    Mely Mohan RD, CDE  pgr [phone]

## 2018-08-17 NOTE — CDMP QUERY
Please clarify if this patient is being treated/managed for:    => Uncontrolled DM2 with hyperglycemia as evidenced by persistently elevated BS and HgbA1C 10.2  =>Other Explanation of clinical findings  =>Unable to Determine (no explanation of clinical findings)    The medical record reflects the following:    Risk: DM2, paraplegic    Clinical Indicators: Patient presented w/ non-healing ulceration to L heel. On admission her glucose = 276. HgbA1c = 10.2. Blood sugars have ranged from 255-429    Treatment:  SSI, lantus, humalog, frequent monitoring of blood sugar, diabetic education    Please clarify and document your clinical opinion in the progress notes and discharge summary including the definitive and/or presumptive diagnosis, (suspected or probable), related to the above clinical findings. Please include clinical findings supporting your diagnosis. If you DECLINE this query or would like to communicate with [facility], please utilize the \"[employer] message box\" at the TOP of the Progress Note on the right.       Thank you,  Janie Abdi [facility], [phone]

## 2018-08-17 NOTE — PROGRESS NOTES
[facility]ist [facility]  Progress Note    Patient: Nela Vaughan Age: 76 y.o. : 1950 MR#: [id number] SSN: [id number]  Date/Time: 2018     Subjective:     Review of systems    No CP   NO NVD  No SOB  NO Cough     Assessment/Plan:   1. Osteomyelitis right foot   2 HTN  3 DM2  4 HLD   5 H/o Iliopsoas abscess with paraplegia   6 Chronic Urinary retention on Indwelling cath   7 H/o PPM h/o Afib - not on anticoagulation - due to severe bleed/hematoma   8 H/o CVA   9 chronic systolic Heart failure ( overview - Her very last echocardiogram in 2015 showed that her ejection fraction was still very mildly decreased at 45-50%.  She did have a biventricular AICD placed, but due to trauma and infection in that area that had to be removed and now she has a biventricular pacemaker with a pulse generator residing in her left upper quadrant, placed back in 2012. )        PLAN   - Local wound care   - Continue current antibiotics   - Follow with Podiatry   - Follow Cultures       Case discussed with:  [x]Patient  []Family  []Nursing  []Case Management  DVT Prophylaxis:  []Lovenox  []Hep SQ  []SCDs  []Coumadin   []On Heparin gtt          Objective:   VS:   Visit Vitals    /74 (BP 1 Location: Right arm, BP Patient Position: At rest)    Pulse 70    Temp 97.5 °F (36.4 °C)    Resp 18    Ht 5' 7\" (1.702 m)    Wt 107 kg (236 lb)    SpO2 100%    BMI 36.96 kg/m2      Tmax/24hrs: Temp (24hrs), Av.5 °F (36.4 °C), Min:97 °F (36.1 °C), Max:98 °F (36.7 °C)  IOBRIEF  Intake/Output Summary (Last 24 hours) at 18 1028  Last data filed at 18 0659   Gross per 24 hour   Intake          2228.75 ml   Output              650 ml   Net          1578.75 ml       General:  Alert, cooperative, no acute distress   Cardiovascular: S1S2 - regular , No Murmur   Pulmonary: Equal expansion , No Use of accessory muscles , No Rales No Rhonchi    GI:  +BS in all four quadrants, soft, non-tender  Extremities:  No edema; 2+ dorsalis pedis pulses bilaterally  Neuro: Alert and oriented X 2.        Medications:   Current Facility-Administered Medications   Medication Dose Route Frequency    aztreonam (AZACTAM) 2 g in 0.9% sodium chloride (MBP/ADV) 100 mL MBP  2 g IntraVENous Q8H    metroNIDAZOLE (FLAGYL) IVPB premix 500 mg  500 mg IntraVENous Q12H    vancomycin (VANCOCIN) 1,750 mg in 0.9% sodium chloride 500 mL IVPB  1,750 mg IntraVENous Q18H    carvedilol (COREG) tablet 6.25 mg  6.25 mg Oral BID WITH MEALS    baclofen (LIORESAL) tablet 10 mg  10 mg Oral DAILY    famotidine (PEPCID) tablet 20 mg  20 mg Oral QHS    ferrous sulfate tablet 325 mg  325 mg Oral BID WITH MEALS    folic acid (FOLVITE) tablet 1 mg  1 mg Oral DAILY    furosemide (LASIX) tablet 40 mg  40 mg Oral DAILY    gabapentin (NEURONTIN) capsule 600 mg  600 mg Oral BID    insulin lispro (HUMALOG) injection 5 Units  5 Units SubCUTAneous TIDAC    pravastatin (PRAVACHOL) tablet 40 mg  40 mg Oral QHS    ondansetron (ZOFRAN) injection 4 mg  4 mg IntraVENous Q6H PRN    0.9% sodium chloride infusion  75 mL/hr IntraVENous CONTINUOUS    heparin (porcine) injection 5,000 Units  5,000 Units SubCUTAneous Q8H    acetaminophen (TYLENOL) tablet 650 mg  650 mg Oral Q6H PRN    glucose chewable tablet 16 g  4 Tab Oral PRN    glucagon (GLUCAGEN) injection 1 mg  1 mg IntraMUSCular PRN    dextrose (D50W) injection syrg 12.5-25 g  25-50 mL IntraVENous PRN    insulin lispro (HUMALOG) injection   SubCUTAneous AC&HS    insulin glargine (LANTUS) injection 10 Units  10 Units SubCUTAneous QHS    diphenhydrAMINE (BENADRYL) 12.5 mg/5 mL oral elixir 12.5 mg  12.5 mg Oral Q6H PRN       Labs:    Recent Labs      08/17/18   0240  08/16/18   1342   WBC  6.0  7.3   HGB  10.2*  10.8*   HCT  30.6*  30.9*   PLT  188  171     Recent Labs      08/17/18   0240  08/16/18   1342   NA  142  141   K  3.4*  3.8   CL  106  113*   CO2  28  22   GLU  204*  276* BUN  32*  20*   CREA  1.39*  0.91   CA  8.2*  6.0*   ALB  2.5*  2.0*   SGOT  14*  39*   ALT  17  15         Signed By: Jaime Quinones MD     August 17, 2018

## 2018-08-17 NOTE — INTERDISCIPLINARY ROUNDS
Interdisciplinary Round Note   Patient Information:   Ramsey Garcia   120/54   Reason for Admission: Osteomyelitis [facility])  Foot osteomyelitis, right [facility])   Attending Provider:   Aisha Mendoza MD  Primary Care Physician:       Lillie Overton, [address]   Estimated discharge date:  8/23/2018   Hospital day: 1  [unfilled]  - - -  RRAT Score: High Risk            26       Total Score        3 Has Seen PCP in Last 6 Months (Yes=3, No=0)    5 Pt. Coverage (Medicare=5 , Medicaid, or Self-Pay=4)    18 Charlson Comorbidity Score (Age + Comorbid Conditions)        Criteria that do not apply:    . Living with Significant Other. Assisted Living. LTAC. SNF. or   Rehab    Patient Length of Stay (>5 days = 3)    IP Visits Last 12 Months (1-3=4, 4=9, >4=11)            No         Chemical      Lines, Drains, & Airways  Krueger and PIV catheter       IV Antibiotics:    Current Antimicrobial Therapy (168h ago through future)    Ordered     Start Stop    08/16/18 1459  vancomycin (VANCOCIN) 1,750 mg in 0.9% sodium chloride 500 mL IVPB  1,750 mg,   IntraVENous,   EVERY 18 HOURS      08/17/18 0800 --    08/16/18 1031  metroNIDAZOLE (FLAGYL) IVPB premix 500 mg  500 mg,   IntraVENous,   EVERY 12 HOURS      08/16/18 1032 --    08/16/18 1025  aztreonam (AZACTAM) 2 g in 0.9% sodium chloride (MBP/ADV) 100 mL MBP  2 g,   IntraVENous,   EVERY 8 HOURS      08/16/18 1026 --        GI Prophylaxis: GI Prophylaxis: yes   Type: Famotidine PO, but patient refuses to take. Recent Glucose Results:   Lab Results   Component Value Date/Time     (H) 08/16/2018 01:42 PM    GLUCPOC 291 (H) 08/17/2018 01:00 AM      Activity Level: Activity Level: Bed Rest    Needs assistance with ADLs: no       Goals for Today:  To complete evaluation by podiatrist   Recommendations:   Discharge Disposition: Home with home health PT  P.T, O.T. and CM  Follow up phone call to assess:   medication knowledge and compliance, follow up physician appointments and ongoing needs assessment    Needs for Discharge:  IDR Team:   Recommendations from IDR team:     Other Notes:

## 2018-08-17 NOTE — INTERDISCIPLINARY ROUNDS
Interdisciplinary Round Note   Patient Information:   Kerrie Amaya   197/00   Reason for Admission: Osteomyelitis [facility])  Foot osteomyelitis, right [facility])   Attending Provider:   Fariha Temple MD  Primary Care Physician:       Elida Felton DO       [phone]   Estimated discharge date:  8/23/2018   Hospital day: 1  [unfilled]  - - -  RRAT Score: High Risk            26       Total Score        3 Has Seen PCP in Last 6 Months (Yes=3, No=0)    5 Pt. Coverage (Medicare=5 , Medicaid, or Self-Pay=4)    18 Charlson Comorbidity Score (Age + Comorbid Conditions)        Criteria that do not apply:    . Living with Significant Other. Assisted Living. LTAC. SNF. or   Rehab    Patient Length of Stay (>5 days = 3)    IP Visits Last 12 Months (1-3=4, 4=9, >4=11)            No         Chemical      Lines, Drains, & Airways  Krueger and PIV catheter       IV Antibiotics:    Current Antimicrobial Therapy (168h ago through future)    Ordered     Start Stop    08/16/18 1459  vancomycin (VANCOCIN) 1,750 mg in 0.9% sodium chloride 500 mL IVPB  1,750 mg,   IntraVENous,   EVERY 18 HOURS      08/17/18 0800 --    08/16/18 1031  metroNIDAZOLE (FLAGYL) IVPB premix 500 mg  500 mg,   IntraVENous,   EVERY 12 HOURS      08/16/18 1032 --    08/16/18 1025  aztreonam (AZACTAM) 2 g in 0.9% sodium chloride (MBP/ADV) 100 mL MBP  2 g,   IntraVENous,   EVERY 8 HOURS      08/16/18 1026 --        GI Prophylaxis: GI Prophylaxis: yes   Type: Famotidine PO, but patient refuses to take. Recent Glucose Results:   Lab Results   Component Value Date/Time     (H) 08/17/2018 02:40 AM     (H) 08/16/2018 01:42 PM    GLUCPOC 255 (H) 08/17/2018 11:16 AM    GLUCPOC 258 (H) 08/17/2018 08:30 AM    GLUCPOC 291 (H) 08/17/2018 01:00 AM      Activity Level: Activity Level: Bed Rest    Needs assistance with ADLs: no       Goals for Today:  To complete evaluation by podiatrist   Recommendations:   Discharge Disposition: Home with home health PT  P.T, O.T. and CM  Follow up phone call to assess:   medication knowledge and compliance, follow up physician appointments and ongoing needs assessment    Needs for Discharge:  IDR Team:   Recommendations from IDR team:     Other Notes:

## 2018-08-17 NOTE — PROGRESS NOTES
Problem: Falls - Risk of  Goal: *Absence of Falls  Document Brad Fall Risk and appropriate interventions in the flowsheet. Outcome: Progressing Towards Goal  Fall Risk Interventions:  Mobility Interventions: Bed/chair exit alarm, Utilize walker, cane, or other assistive device         Medication Interventions: Bed/chair exit alarm, Patient to call before getting OOB    Elimination Interventions: Bed/chair exit alarm, Call light in reach, Toilet paper/wipes in reach, Toileting schedule/hourly rounds             Problem: Pressure Injury - Risk of  Goal: *Prevention of pressure injury  Document Surjit Scale and appropriate interventions in the flowsheet.    Outcome: Progressing Towards Goal  Pressure Injury Interventions:  Sensory Interventions: Assess changes in LOC, Assess need for specialty bed, Check visual cues for pain, Float heels, Keep linens dry and wrinkle-free, Minimize linen layers         Activity Interventions: Assess need for specialty bed, Pressure redistribution bed/mattress(bed type)    Mobility Interventions: Float heels, HOB 30 degrees or less, Pressure redistribution bed/mattress (bed type)    Nutrition Interventions: Document food/fluid/supplement intake    Friction and Shear Interventions: HOB 30 degrees or less, Lift sheet, Minimize layers

## 2018-08-17 NOTE — DIABETES MGMT
Diabetes Patient/Family Education Record    Factors That  May Influence Patients Ability  to Learn or  Comply with Recommendations   []   Language barrier    []   Cultural needs   []   Motivation    []   Cognitive limitation    []   Physical   []   Education    []   Physiological factors   []   Hearing/vision/speaking impairment   []   Rastafari beliefs    []   Financial factors   []  Other:   [x]  No factors identified at this time.      Person Instructed:   [x]   Patient   []   Family   []  Other     Preference for Learning:   [x]   Verbal   [x]   Written   []  Demonstration     Level of Comprehension & Competence:    []  Good                                      [x] Fair                                     []  Poor                             []  Needs Reinforcement   [x]  Teachback completed    Education Component:   [x]  Medication management, including how to administer insulin (if appropriate) and potential medication interactions    [x]  Nutritional management    []  Exercise   [x]  Signs, symptoms, and treatment of hyperglycemia and hypoglycemia   [x] Prevention, recognition and treatment of hyperglycemia and hypoglycemia   [x]  Importance of blood glucose monitoring and how to obtain a blood glucose meter    []  Instruction on use of the blood glucose meter   [x]  Discuss the importance of HbA1C monitoring    []  Sick day guidelines   []  Proper use and disposal of lancets, needles, syringes or insulin pens (if appropriate)   [x]  Potential long-term complications (retinopathy, kidney disease, neuropathy, foot care)   [] Information about whom to contact in case of emergency or for more information    [x]  Goal:  Patient/family will demonstrate understanding of Diabetes Self Management Skills by: 8/24/18  Plan for post-discharge education or self-management support:    [x] Outpatient class schedule provided            [] Patient Declined    [] Scheduled for outpatient classes (date) _______     Juliocesar Renner RD, CDE  pgr [phone]

## 2018-08-18 LAB
ANION GAP SERPL CALC-SCNC: 7 MMOL/L (ref 3–18)
ATRIAL RATE: 81 BPM
BUN SERPL-MCNC: 25 MG/DL (ref 7–18)
BUN/CREAT SERPL: 19 (ref 12–20)
CALCIUM SERPL-MCNC: 8.2 MG/DL (ref 8.5–10.1)
CALCULATED P AXIS, ECG09: 59 DEGREES
CALCULATED R AXIS, ECG10: 66 DEGREES
CALCULATED T AXIS, ECG11: -108 DEGREES
CHLORIDE SERPL-SCNC: 110 MMOL/L (ref 100–108)
CO2 SERPL-SCNC: 26 MMOL/L (ref 21–32)
CREAT SERPL-MCNC: 1.3 MG/DL (ref 0.6–1.3)
DATE LAST DOSE: NORMAL
DIAGNOSIS, 93000: NORMAL
GLUCOSE BLD STRIP.AUTO-MCNC: 126 MG/DL (ref 70–110)
GLUCOSE BLD STRIP.AUTO-MCNC: 188 MG/DL (ref 70–110)
GLUCOSE BLD STRIP.AUTO-MCNC: 198 MG/DL (ref 70–110)
GLUCOSE BLD STRIP.AUTO-MCNC: 332 MG/DL (ref 70–110)
GLUCOSE SERPL-MCNC: 149 MG/DL (ref 74–99)
P-R INTERVAL, ECG05: 146 MS
POTASSIUM SERPL-SCNC: 3.5 MMOL/L (ref 3.5–5.5)
Q-T INTERVAL, ECG07: 478 MS
QRS DURATION, ECG06: 194 MS
QTC CALCULATION (BEZET), ECG08: 555 MS
REPORTED DOSE,DOSE: NORMAL UNITS
REPORTED DOSE/TIME,TMG: 900
SODIUM SERPL-SCNC: 143 MMOL/L (ref 136–145)
VANCOMYCIN TROUGH SERPL-MCNC: 13.3 UG/ML (ref 10–20)
VENTRICULAR RATE, ECG03: 81 BPM

## 2018-08-18 PROCEDURE — 74011636637 HC RX REV CODE- 636/637: Performed by: INTERNAL MEDICINE

## 2018-08-18 PROCEDURE — 74011250637 HC RX REV CODE- 250/637: Performed by: INTERNAL MEDICINE

## 2018-08-18 PROCEDURE — 94660 CPAP INITIATION&MGMT: CPT

## 2018-08-18 PROCEDURE — 74011636637 HC RX REV CODE- 636/637: Performed by: HOSPITALIST

## 2018-08-18 PROCEDURE — 74011000258 HC RX REV CODE- 258: Performed by: EMERGENCY MEDICINE

## 2018-08-18 PROCEDURE — 82962 GLUCOSE BLOOD TEST: CPT

## 2018-08-18 PROCEDURE — 65270000029 HC RM PRIVATE

## 2018-08-18 PROCEDURE — 74011250636 HC RX REV CODE- 250/636: Performed by: EMERGENCY MEDICINE

## 2018-08-18 PROCEDURE — 80048 BASIC METABOLIC PNL TOTAL CA: CPT | Performed by: EMERGENCY MEDICINE

## 2018-08-18 PROCEDURE — 36415 COLL VENOUS BLD VENIPUNCTURE: CPT | Performed by: EMERGENCY MEDICINE

## 2018-08-18 PROCEDURE — 74011250637 HC RX REV CODE- 250/637: Performed by: HOSPITALIST

## 2018-08-18 PROCEDURE — 74011250636 HC RX REV CODE- 250/636: Performed by: HOSPITALIST

## 2018-08-18 PROCEDURE — 5A09357 ASSISTANCE WITH RESPIRATORY VENTILATION, LESS THAN 24 CONSECUTIVE HOURS, CONTINUOUS POSITIVE AIRWAY PRESSURE: ICD-10-PCS | Performed by: INTERNAL MEDICINE

## 2018-08-18 PROCEDURE — 80202 ASSAY OF VANCOMYCIN: CPT | Performed by: EMERGENCY MEDICINE

## 2018-08-18 RX ORDER — POTASSIUM CHLORIDE 20 MEQ/1
40 TABLET, EXTENDED RELEASE ORAL
Status: COMPLETED | OUTPATIENT
Start: 2018-08-18 | End: 2018-08-18

## 2018-08-18 RX ADMIN — INSULIN LISPRO 3 UNITS: 100 INJECTION, SOLUTION INTRAVENOUS; SUBCUTANEOUS at 08:22

## 2018-08-18 RX ADMIN — PRAVASTATIN SODIUM 40 MG: 20 TABLET ORAL at 23:07

## 2018-08-18 RX ADMIN — CARVEDILOL 6.25 MG: 6.25 TABLET, FILM COATED ORAL at 08:30

## 2018-08-18 RX ADMIN — DIPHENHYDRAMINE HYDROCHLORIDE 12.5 MG: 25 SOLUTION ORAL at 10:10

## 2018-08-18 RX ADMIN — AZTREONAM 2 G: 2 INJECTION, POWDER, LYOPHILIZED, FOR SOLUTION INTRAMUSCULAR; INTRAVENOUS at 10:10

## 2018-08-18 RX ADMIN — METRONIDAZOLE 500 MG: 500 INJECTION, SOLUTION INTRAVENOUS at 10:52

## 2018-08-18 RX ADMIN — SODIUM CHLORIDE 75 ML/HR: 900 INJECTION, SOLUTION INTRAVENOUS at 09:23

## 2018-08-18 RX ADMIN — HEPARIN SODIUM 5000 UNITS: 5000 INJECTION, SOLUTION INTRAVENOUS; SUBCUTANEOUS at 17:28

## 2018-08-18 RX ADMIN — FERROUS SULFATE TAB 325 MG (65 MG ELEMENTAL FE) 325 MG: 325 (65 FE) TAB at 08:29

## 2018-08-18 RX ADMIN — FERROUS SULFATE TAB 325 MG (65 MG ELEMENTAL FE) 325 MG: 325 (65 FE) TAB at 17:27

## 2018-08-18 RX ADMIN — INSULIN LISPRO 5 UNITS: 100 INJECTION, SOLUTION INTRAVENOUS; SUBCUTANEOUS at 08:21

## 2018-08-18 RX ADMIN — GABAPENTIN 600 MG: 300 CAPSULE ORAL at 08:29

## 2018-08-18 RX ADMIN — FAMOTIDINE 20 MG: 20 TABLET ORAL at 23:07

## 2018-08-18 RX ADMIN — INSULIN GLARGINE 10 UNITS: 100 INJECTION, SOLUTION SUBCUTANEOUS at 23:14

## 2018-08-18 RX ADMIN — INSULIN LISPRO 3 UNITS: 100 INJECTION, SOLUTION INTRAVENOUS; SUBCUTANEOUS at 17:32

## 2018-08-18 RX ADMIN — BACLOFEN 10 MG: 10 TABLET ORAL at 08:30

## 2018-08-18 RX ADMIN — FUROSEMIDE 40 MG: 40 TABLET ORAL at 08:29

## 2018-08-18 RX ADMIN — HEPARIN SODIUM 5000 UNITS: 5000 INJECTION, SOLUTION INTRAVENOUS; SUBCUTANEOUS at 10:17

## 2018-08-18 RX ADMIN — CARVEDILOL 6.25 MG: 6.25 TABLET, FILM COATED ORAL at 17:28

## 2018-08-18 RX ADMIN — DAKIN'S SOLUTION 0.125% (QUARTER STRENGTH): 0.12 SOLUTION at 09:14

## 2018-08-18 RX ADMIN — INSULIN LISPRO 5 UNITS: 100 INJECTION, SOLUTION INTRAVENOUS; SUBCUTANEOUS at 11:58

## 2018-08-18 RX ADMIN — POTASSIUM CHLORIDE 40 MEQ: 20 TABLET, EXTENDED RELEASE ORAL at 15:35

## 2018-08-18 RX ADMIN — DAKIN'S SOLUTION 0.125% (QUARTER STRENGTH): 0.12 SOLUTION at 23:16

## 2018-08-18 RX ADMIN — FOLIC ACID 1 MG: 1 TABLET ORAL at 08:29

## 2018-08-18 RX ADMIN — HEPARIN SODIUM 5000 UNITS: 5000 INJECTION, SOLUTION INTRAVENOUS; SUBCUTANEOUS at 02:28

## 2018-08-18 RX ADMIN — INSULIN LISPRO 5 UNITS: 100 INJECTION, SOLUTION INTRAVENOUS; SUBCUTANEOUS at 17:31

## 2018-08-18 RX ADMIN — VANCOMYCIN HYDROCHLORIDE 1250 MG: 10 INJECTION, POWDER, LYOPHILIZED, FOR SOLUTION INTRAVENOUS at 15:35

## 2018-08-18 RX ADMIN — INSULIN LISPRO 12 UNITS: 100 INJECTION, SOLUTION INTRAVENOUS; SUBCUTANEOUS at 11:58

## 2018-08-18 RX ADMIN — AZTREONAM 2 G: 2 INJECTION, POWDER, LYOPHILIZED, FOR SOLUTION INTRAMUSCULAR; INTRAVENOUS at 02:28

## 2018-08-18 RX ADMIN — GABAPENTIN 600 MG: 300 CAPSULE ORAL at 17:27

## 2018-08-18 RX ADMIN — AZTREONAM 2 G: 2 INJECTION, POWDER, LYOPHILIZED, FOR SOLUTION INTRAMUSCULAR; INTRAVENOUS at 17:40

## 2018-08-18 RX ADMIN — DIPHENHYDRAMINE HYDROCHLORIDE 12.5 MG: 25 SOLUTION ORAL at 17:38

## 2018-08-18 RX ADMIN — METRONIDAZOLE 500 MG: 500 INJECTION, SOLUTION INTRAVENOUS at 23:10

## 2018-08-18 NOTE — PROGRESS NOTES
met with Patient completed the initial Spiritual Assessment of the patient, and offered Pastoral Care, see flow sheets for interventions. Patient does not have any Catholic/cultural needs that will affect patients preferences in health care. Charted reviewed. Chaplains will continue to follow and will provide pastoral care on an as needed/requested basis.       Chloé MPH   [facility]  Office:[phone]

## 2018-08-18 NOTE — ROUTINE PROCESS
Bedside and Verbal shift change report given to DANIEL Woodson (oncoming nurse) by Jose Page RN (offgoing nurse). Report included the following information SBAR, Kardex, MAR and Recent Results. SITUATION:  Code Status: Full Code  Reason for Admission: Osteomyelitis ([facility] [address]  Foot osteomyelitis, right [facility])  Hospital day: 2  Problem List:       Hospital Problems  Date Reviewed: 8/17/2018          Codes Class Noted POA    Osteomyelitis ([facility] [address] ICD-10-CM: M86.9  ICD-9-CM: 730.20  8/16/2018 Unknown        Foot osteomyelitis, right ([address] ICD-10-CM: M86.9  ICD-9-CM: 730.27  8/16/2018 Unknown              BACKGROUND:   Past Medical History:   Past Medical History:   Diagnosis Date    Acute paraplegia ([facility] [address] 4/20/2017    Benign hypertensive heart disease with systolic CHF, NYHA class 2 ([address] 9/5/2012    Biventricular implantable cardioverter-defibrillator in situ 04/28/2005    Upgraded to BiV AICD; gen change 4/2008; pocket revision 10/2009; Abdominal - done on 8/22/2012 by Dr. Fransisco Tejada Cardiac cath 08/15/1996    Patent coronaries. Elev LVEDP. EF 50-55%.  Cardiac echocardiogram 06/23/2015    Ltd study. EF 45-50%. Mild, diffuse hypk. Severe apical hypk. No mass or thrombus was clearly identified, although imaging was suboptimal.      Cardiac nuclear imaging test 06/19/2015    Fixed distal apical, distal septal defect more likely due to RV pacing than prior infarct. No ischemia. EF 46%. RWMA c/w RV pacing. Nondiagnostic EKG on pharm stress test.      Cardiovascular lower extremity venous duplex 09/04/2012    Acute, non-occlusive DVT in CFV on right. No DVT on left. No superficial thrombosis bilaterally.  Cardiovascular upper extremity venous duplex 08/27/2012    DVT in axillary vein on left. Left subclavian was not visualized.     Chronic anemia 9/5/2012    Chronic systolic heart failure (HCC)     Decreased calculated glomerular filtration rate (GFR) 3/30/2017    Calculated GFR equivalent to that of CKD stage 3 = 30-59 ml/min    Diabetic neuropathy associated with type 2 diabetes mellitus ([facility] 6/28/2011    Difficult airway for intubation 08/22/2012    see anesthesia airway note    Dyslipidemia 6/28/2011    Gout     History of complete heart block 6/28/2011    History of Coumadin therapy     Anticoagulation for DVT of the LUE; Discontinued on 3/30/2017    History of deep venous thrombosis 9/5/2012    Left upper extremity    History of pyelonephritis 3/30/2017    Left bundle branch block (LBBB) on electrocardiogram 6/28/2011    Nonischemic cardiomyopathy ([address] 6/28/2011    Obesity (BMI 35.0-39.9 without comorbidity) 3/13/2017    Obstructive sleep apnea on CPAP 2/7/2012    Psoas abscess, right ([address] 4/20/2017    Psoas hematoma, right, secondary to anticoagulant therapy 3/30/2017    Type 2 diabetes mellitus with diabetic neuropathy ([facility] [address] 6/28/2011      Patient taking anticoagulants yes    Patient has a defibrillator: yes   History of shots YES for example, flu, pneumonia, tetanus   Isolation History NO for example, MRSA, CDiff    ASSESSMENT:  Changes in Assessment Throughout Shift: none  Significant Changes in 24 hours (for example, RR/code, fall)  Patient has Central Line: no   Patient has Krueger Cath: yes Reasons if yes: Chronic urinary retention. Patient is paraplegic.    Mobility Issues  PT  IV Patency  OR Checklist  Pending Tests    Last Vitals:  Vitals w/ MEWS Score (last day)     Date/Time MEWS Score Pulse Resp Temp BP Level of Consciousness SpO2    08/18/18 0400 1 68 16 97.1 °F (36.2 °C) 110/61 Alert 100 %    08/18/18 0000 1 70 16 97.8 °F (36.6 °C) 110/62 Alert 100 %    08/17/18 2000 1 75 16 97.2 °F (36.2 °C) 118/69 Alert 100 %    08/17/18 1526 1 73 18 98.6 °F (37 °C) 112/61 Alert 100 %    08/17/18 1118 1 66 19 97.5 °F (36.4 °C) 101/60 Alert 100 %    08/17/18 0806 1 70 18 97.5 °F (36.4 °C) 124/74 Alert 100 %    08/17/18 0357 1 72 18 98 °F (36.7 °C) 112/59 Alert 100 % PAIN    Pain Assessment    Pain Intensity 1: 0 (08/18/18 0416)    Pain Location 1: Leg    Pain Intervention(s) 1: Medication (see MAR)    Patient Stated Pain Goal: 0  Intervention effective: yes  Time of last intervention: 2235 Reassessment Completed: yes   Other actions taken for pain: Distraction    Last 3 Weights:  Last 3 Recorded Weights in this Encounter    08/16/18 1004 08/18/18 0416   Weight: 107 kg (236 lb) 107.5 kg (237 lb 1.6 oz)   Weight change: 0.499 kg (1 lb 1.6 oz)    INTAKE/OUPUT    Current Shift:      Last three shifts: 08/16 1901 - 08/18 0700  In: 4468.8 [P.O.:960; I.V.:3508.8]  Out: 3450 [Urine:3450]    RECOMMENDATIONS AND DISCHARGE PLANNING  Patient needs and requests: Wound Care    Pending tests/procedures: labs     Discharge plan for patient: Home    Discharge planning Needs or Barriers: none    Estimated Discharge Date: 8/23/2018 Posted on Whiteboard in Patients Room: yes       \"HEALS\" SAFETY CHECK  A safety check occurred in the patient's room between off going nurse and oncoming nurse listed above. The safety check included the below items:    H  High Alert Medications Verify all high alert medication drips (heparin, PCA, etc.)  E  Equipment Suction is set up for ALL patients (with vania)  Red plugs utilized for all equipment (IV pumps, etc.)  WOWs wiped down at end of shift. Room stocked with oxygen, suction, and other unit-specific supplies  A  Alarms Bed alarm is set for fall risk patients  Ensure chair alarm is in place and activated if patient is up in a chair  L  Lines Check IV for any infiltration  Krueger bag is empty if patient has a Krueger   Tubing and IV bags are labeled  S  Safety  Room is clean, patient is clean, and equipment is clean. Hallways are clear from equipment besides carts. Fall bracelet on for fall risk patients  Ensure room is clear and free of clutter  Suction is set up for ALL patients (with vania)  Hallways are clear from equipment besides carts. Isolation precautions followed, supplies available outside room, sign posted    Damaris Kauffman, RN

## 2018-08-18 NOTE — ROUTINE PROCESS
Bedside and Verbal shift change report given to Ron Solares (oncoming nurse) by Maicol Pablo RN (offgoing nurse). Report included the following information SBAR, Kardex, MAR and Recent Results. SITUATION:  Code Status: Full Code  Reason for Admission: Osteomyelitis ([facility] [address]  Foot osteomyelitis, right [facility])  Hospital day: 2  Problem List:       Hospital Problems  Date Reviewed: 8/17/2018          Codes Class Noted POA    Osteomyelitis ([facility] [address] ICD-10-CM: M86.9  ICD-9-CM: 730.20  8/16/2018 Unknown        Foot osteomyelitis, right ([facility] [address] ICD-10-CM: M86.9  ICD-9-CM: 730.27  8/16/2018 Unknown              BACKGROUND:   Past Medical History:   Past Medical History:   Diagnosis Date    Acute paraplegia ([facility] [address] 4/20/2017    Benign hypertensive heart disease with systolic CHF, NYHA class 2 ([facility] [address] 9/5/2012    Biventricular implantable cardioverter-defibrillator in situ 04/28/2005    Upgraded to BiV AICD; gen change 4/2008; pocket revision 10/2009; Abdominal - done on 8/22/2012 by Dr. Rancho Valente Cardiac cath 08/15/1996    Patent coronaries. Elev LVEDP. EF 50-55%.  Cardiac echocardiogram 06/23/2015    Ltd study. EF 45-50%. Mild, diffuse hypk. Severe apical hypk. No mass or thrombus was clearly identified, although imaging was suboptimal.      Cardiac nuclear imaging test 06/19/2015    Fixed distal apical, distal septal defect more likely due to RV pacing than prior infarct. No ischemia. EF 46%. RWMA c/w RV pacing. Nondiagnostic EKG on pharm stress test.      Cardiovascular lower extremity venous duplex 09/04/2012    Acute, non-occlusive DVT in CFV on right. No DVT on left. No superficial thrombosis bilaterally.  Cardiovascular upper extremity venous duplex 08/27/2012    DVT in axillary vein on left. Left subclavian was not visualized.     Chronic anemia 9/5/2012    Chronic systolic heart failure (HCC)     Decreased calculated glomerular filtration rate (GFR) 3/30/2017    Calculated GFR equivalent to that of CKD stage 3 = 30-59 ml/min    Diabetic neuropathy associated with type 2 diabetes mellitus ([facility] [address] 6/28/2011    Difficult airway for intubation 08/22/2012    see anesthesia airway note    Dyslipidemia 6/28/2011    Gout     History of complete heart block 6/28/2011    History of Coumadin therapy     Anticoagulation for DVT of the LUE; Discontinued on 3/30/2017    History of deep venous thrombosis 9/5/2012    Left upper extremity    History of pyelonephritis 3/30/2017    Left bundle branch block (LBBB) on electrocardiogram 6/28/2011    Nonischemic cardiomyopathy ([address] 6/28/2011    Obesity (BMI 35.0-39.9 without comorbidity) 3/13/2017    Obstructive sleep apnea on CPAP 2/7/2012    Psoas abscess, right ([address] 4/20/2017    Psoas hematoma, right, secondary to anticoagulant therapy 3/30/2017    Type 2 diabetes mellitus with diabetic neuropathy ([facility] [address] 6/28/2011      Patient taking anticoagulants yes    Patient has a defibrillator: yes   History of shots YES for example, flu, pneumonia, tetanus   Isolation History NO for example, MRSA, CDiff    ASSESSMENT:  Changes in Assessment Throughout Shift: none  Significant Changes in 24 hours (for example, RR/code, fall)  Patient has Central Line: no   Patient has Krueger Cath: yes Reasons if yes: Chronic urinary retention. Patient is paraplegic.    Mobility Issues  PT  IV Patency  OR Checklist  Pending Tests    Last Vitals:  Vitals w/ MEWS Score (last day)     Date/Time MEWS Score Pulse Resp Temp BP Level of Consciousness SpO2    08/18/18 1601 1 69 18 97.3 °F (36.3 °C) 124/71 Alert 99 %    08/18/18 1143 1 80 16 97.9 °F (36.6 °C) 113/59 Alert 100 %    08/18/18 0811 1 77 18 97.1 °F (36.2 °C) 140/74 Alert 99 %    08/18/18 0400 1 68 16 97.1 °F (36.2 °C) 110/61 Alert 100 %    08/18/18 0000 1 70 16 97.8 °F (36.6 °C) 110/62 Alert 100 %    08/17/18 2000 1 75 16 97.2 °F (36.2 °C) 118/69 Alert 100 %    08/17/18 1526 1 73 18 98.6 °F (37 °C) 112/61 Alert 100 % 08/17/18 1118 1 66 19 97.5 °F (36.4 °C) 101/60 Alert 100 %    08/17/18 0806 1 70 18 97.5 °F (36.4 °C) 124/74 Alert 100 %    08/17/18 0357 1 72 18 98 °F (36.7 °C) 112/59 Alert 100 %            PAIN    Pain Assessment    Pain Intensity 1: 0 (08/18/18 0800)    Pain Location 1: Leg    Pain Intervention(s) 1: Medication (see MAR)    Patient Stated Pain Goal: 0  Intervention effective: yes  Time of last intervention: 1830 Reassessment Completed: yes   Other actions taken for pain: Distraction    Last 3 Weights:  Last 3 Recorded Weights in this Encounter    08/16/18 1004 08/18/18 0416   Weight: 107 kg (236 lb) 107.5 kg (237 lb 1.6 oz)   Weight change: 0.499 kg (1 lb 1.6 oz)    INTAKE/OUPUT    Current Shift: 08/18 1901 - 08/19 0700  In: -   Out: 950 [Urine:950]    Last three shifts: 08/17 0701 - 08/18 1900  In: 1455 [P.O.:780; I.V.:2215]  Out: 4350 [Urine:4350]    RECOMMENDATIONS AND DISCHARGE PLANNING  Patient needs and requests: Wound Care    Pending tests/procedures: labs     Discharge plan for patient: Home    Discharge planning Needs or Barriers: none    Estimated Discharge Date: 8/23/2018 Posted on Whiteboard in Patients Room: yes       \"HEALS\" SAFETY CHECK  A safety check occurred in the patient's room between off going nurse and oncoming nurse listed above. The safety check included the below items:    H  High Alert Medications Verify all high alert medication drips (heparin, PCA, etc.)  E  Equipment Suction is set up for ALL patients (with yanker)  Red plugs utilized for all equipment (IV pumps, etc.)  WOWs wiped down at end of shift.   Room stocked with oxygen, suction, and other unit-specific supplies  A  Alarms Bed alarm is set for fall risk patients  Ensure chair alarm is in place and activated if patient is up in a chair  L  Lines Check IV for any infiltration  Krueger bag is empty if patient has a Krueger   Tubing and IV bags are labeled  S  Safety  Room is clean, patient is clean, and equipment is clean.  Hallways are clear from equipment besides carts. Fall bracelet on for fall risk patients  Ensure room is clear and free of clutter  Suction is set up for ALL patients (with vania)  Hallways are clear from equipment besides carts.    Isolation precautions followed, supplies available outside room, sign posted    Madhavi Marks RN

## 2018-08-18 NOTE — PROGRESS NOTES
CPAP set up at 10cm per order. Mask adjusted and placed on patient. Patient acknowledged that it felt fine and requested that it be taken off until approx. 10 pm tonight when she goes to sleep.

## 2018-08-18 NOTE — PROGRESS NOTES
[facility] Hospitalist Group  Progress Note    Patient: Kerrie Amaya Age: 76 y.o. : 1950 MR#: [id number] SSN: [id number]  Date/Time: 2018     Subjective:     Review of systems    Lying in bed comfortable   No CP  No SOB  No NVD     Assessment/Plan:   1. Osteomyelitis right foot   2 HTN  3 DM2  4 HLD   5 H/o Iliopsoas abscess with paraplegia   6 Chronic Urinary retention on Indwelling cath   7 H/o PPM h/o Afib - not on anticoagulation - due to severe bleed/hematoma   8 H/o CVA   9 chronic systolic Heart failure ( overview - Her very last echocardiogram in 2015 showed that her ejection fraction was still very mildly decreased at 45-50%.  She did have a biventricular AICD placed, but due to trauma and infection in that area that had to be removed and now she has a biventricular pacemaker with a pulse generator residing in her left upper quadrant, placed back in 2012. )        PLAN   - Local right foot/ heel dressings   - Podiatry note reviewed   - For surgery soon   - Continue antibiotics / ssi / monitor     - D/w Family in room with her     Case discussed with:  [x]Patient  [x]Family  []Nursing  []Case Management  DVT Prophylaxis:  []Lovenox  []Hep SQ  []SCDs  []Coumadin   []On Heparin gtt          Objective:   VS:   Visit Vitals    /59 (BP 1 Location: Right arm, BP Patient Position: At rest)    Pulse 80    Temp 97.9 °F (36.6 °C)    Resp 16    Ht 5' 7\" (1.702 m)    Wt 107.5 kg (237 lb 1.6 oz)    SpO2 100%    BMI 37.14 kg/m2      Tmax/24hrs: Temp (24hrs), Av.6 °F (36.4 °C), Min:97.1 °F (36.2 °C), Max:98.6 °F (37 °C)  IOBRIEF    Intake/Output Summary (Last 24 hours) at 18 1447  Last data filed at 18 1315   Gross per 24 hour   Intake          2241.25 ml   Output             3800 ml   Net         -1558.75 ml       General:  Alert, cooperative, no acute distress   Cardiovascular: S1S2 - regular , No Murmur   Pulmonary: Equal expansion , No Use of accessory muscles , No Rales No Rhonchi    GI:  +BS in all four quadrants, soft, non-tender  Extremities:  No edema; 2+ dorsalis pedis pulses bilaterally  Neuro: Alert and oriented X 2.        Medications:   Current Facility-Administered Medications   Medication Dose Route Frequency    potassium chloride (K-DUR, KLOR-CON) SR tablet 40 mEq  40 mEq Oral NOW    vancomycin (VANCOCIN) 1,250 mg in 0.9% sodium chloride 250 mL IVPB  1,250 mg IntraVENous Q24H    VANCOMYCIN TROUGH DUE    Other Daily    sodium hypochlorite (QUARTER STRENGTH DAKIN'S) 0.125% irrigation (bottle)   Topical BID    aztreonam (AZACTAM) 2 g in 0.9% sodium chloride (MBP/ADV) 100 mL MBP  2 g IntraVENous Q8H    metroNIDAZOLE (FLAGYL) IVPB premix 500 mg  500 mg IntraVENous Q12H    carvedilol (COREG) tablet 6.25 mg  6.25 mg Oral BID WITH MEALS    baclofen (LIORESAL) tablet 10 mg  10 mg Oral DAILY    famotidine (PEPCID) tablet 20 mg  20 mg Oral QHS    ferrous sulfate tablet 325 mg  325 mg Oral BID WITH MEALS    folic acid (FOLVITE) tablet 1 mg  1 mg Oral DAILY    furosemide (LASIX) tablet 40 mg  40 mg Oral DAILY    gabapentin (NEURONTIN) capsule 600 mg  600 mg Oral BID    insulin lispro (HUMALOG) injection 5 Units  5 Units SubCUTAneous TIDAC    pravastatin (PRAVACHOL) tablet 40 mg  40 mg Oral QHS    ondansetron (ZOFRAN) injection 4 mg  4 mg IntraVENous Q6H PRN    0.9% sodium chloride infusion  75 mL/hr IntraVENous CONTINUOUS    heparin (porcine) injection 5,000 Units  5,000 Units SubCUTAneous Q8H    acetaminophen (TYLENOL) tablet 650 mg  650 mg Oral Q6H PRN    glucose chewable tablet 16 g  4 Tab Oral PRN    glucagon (GLUCAGEN) injection 1 mg  1 mg IntraMUSCular PRN    dextrose (D50W) injection syrg 12.5-25 g  25-50 mL IntraVENous PRN    insulin lispro (HUMALOG) injection   SubCUTAneous AC&HS    insulin glargine (LANTUS) injection 10 Units  10 Units SubCUTAneous QHS    diphenhydrAMINE (BENADRYL) 12.5 mg/5 mL oral elixir 12.5 mg  12.5 mg Oral Q6H PRN       Labs:    Recent Labs      08/17/18   0240  08/16/18   1342   WBC  6.0  7.3   HGB  10.2*  10.8*   HCT  30.6*  30.9*   PLT  188  171     Recent Labs      08/18/18   0315  08/17/18   0240  08/16/18   1342   NA  143  142  141   K  3.5  3.4*  3.8   CL  110*  106  113*   CO2  26  28  22   GLU  149*  204*  276*   BUN  25*  32*  20*   CREA  1.30  1.39*  0.91   CA  8.2*  8.2*  6.0*   ALB   --   2.5*  2.0*   SGOT   --   14*  39*   ALT   --   17  15         Signed By: Tran Covarrubias MD     August 18, 2018

## 2018-08-18 NOTE — PROGRESS NOTES
Problem: Falls - Risk of  Goal: *Absence of Falls  Document Brad Fall Risk and appropriate interventions in the flowsheet. Outcome: Progressing Towards Goal  Fall Risk Interventions:  Mobility Interventions: Bed/chair exit alarm         Medication Interventions: Bed/chair exit alarm, Evaluate medications/consider consulting pharmacy, Patient to call before getting OOB    Elimination Interventions: Patient to call for help with toileting needs             Problem: Pressure Injury - Risk of  Goal: *Prevention of pressure injury  Document Surjit Scale and appropriate interventions in the flowsheet.    Outcome: Progressing Towards Goal  Pressure Injury Interventions:  Sensory Interventions: Assess changes in LOC         Activity Interventions: Pressure redistribution bed/mattress(bed type)    Mobility Interventions: HOB 30 degrees or less    Nutrition Interventions: Document food/fluid/supplement intake    Friction and Shear Interventions: HOB 30 degrees or less

## 2018-08-18 NOTE — PROGRESS NOTES
Problem: Falls - Risk of  Goal: *Absence of Falls  Document Brad Fall Risk and appropriate interventions in the flowsheet. Outcome: Progressing Towards Goal  Fall Risk Interventions:  Mobility Interventions: Bed/chair exit alarm, Patient to call before getting OOB, Utilize walker, cane, or other assistive device         Medication Interventions: Bed/chair exit alarm, Patient to call before getting OOB    Elimination Interventions: Bed/chair exit alarm, Call light in reach, Patient to call for help with toileting needs, Toileting schedule/hourly rounds             Problem: Pressure Injury - Risk of  Goal: *Prevention of pressure injury  Document Surjit Scale and appropriate interventions in the flowsheet.    Outcome: Progressing Towards Goal  Pressure Injury Interventions:  Sensory Interventions: Assess changes in LOC, Assess need for specialty bed, Check visual cues for pain, Float heels, Keep linens dry and wrinkle-free, Minimize linen layers         Activity Interventions: Assess need for specialty bed, Pressure redistribution bed/mattress(bed type)    Mobility Interventions: Assess need for specialty bed, Float heels, HOB 30 degrees or less, Pressure redistribution bed/mattress (bed type)    Nutrition Interventions: Document food/fluid/supplement intake    Friction and Shear Interventions: HOB 30 degrees or less, Lift sheet, Minimize layers

## 2018-08-19 LAB
ANION GAP SERPL CALC-SCNC: 7 MMOL/L (ref 3–18)
BASOPHILS # BLD: 0 K/UL (ref 0–0.1)
BASOPHILS NFR BLD: 0 % (ref 0–2)
BUN SERPL-MCNC: 22 MG/DL (ref 7–18)
BUN/CREAT SERPL: 19 (ref 12–20)
CALCIUM SERPL-MCNC: 8.7 MG/DL (ref 8.5–10.1)
CHLORIDE SERPL-SCNC: 111 MMOL/L (ref 100–108)
CO2 SERPL-SCNC: 25 MMOL/L (ref 21–32)
CREAT SERPL-MCNC: 1.14 MG/DL (ref 0.6–1.3)
DIFFERENTIAL METHOD BLD: ABNORMAL
EOSINOPHIL # BLD: 0.2 K/UL (ref 0–0.4)
EOSINOPHIL NFR BLD: 4 % (ref 0–5)
ERYTHROCYTE [DISTWIDTH] IN BLOOD BY AUTOMATED COUNT: 14.9 % (ref 11.6–14.5)
GLUCOSE BLD STRIP.AUTO-MCNC: 130 MG/DL (ref 70–110)
GLUCOSE BLD STRIP.AUTO-MCNC: 133 MG/DL (ref 70–110)
GLUCOSE BLD STRIP.AUTO-MCNC: 185 MG/DL (ref 70–110)
GLUCOSE BLD STRIP.AUTO-MCNC: 258 MG/DL (ref 70–110)
GLUCOSE BLD STRIP.AUTO-MCNC: 303 MG/DL (ref 70–110)
GLUCOSE SERPL-MCNC: 135 MG/DL (ref 74–99)
HCT VFR BLD AUTO: 29.9 % (ref 35–45)
HGB BLD-MCNC: 10.3 G/DL (ref 12–16)
INR PPP: 1.1 (ref 0.8–1.2)
LYMPHOCYTES # BLD: 1.3 K/UL (ref 0.9–3.6)
LYMPHOCYTES NFR BLD: 29 % (ref 21–52)
MCH RBC QN AUTO: 28.2 PG (ref 24–34)
MCHC RBC AUTO-ENTMCNC: 34.4 G/DL (ref 31–37)
MCV RBC AUTO: 81.9 FL (ref 74–97)
MONOCYTES # BLD: 0.4 K/UL (ref 0.05–1.2)
MONOCYTES NFR BLD: 10 % (ref 3–10)
NEUTS SEG # BLD: 2.6 K/UL (ref 1.8–8)
NEUTS SEG NFR BLD: 57 % (ref 40–73)
PLATELET # BLD AUTO: 181 K/UL (ref 135–420)
PMV BLD AUTO: 9.7 FL (ref 9.2–11.8)
POTASSIUM SERPL-SCNC: 3.9 MMOL/L (ref 3.5–5.5)
PROTHROMBIN TIME: 14.1 SEC (ref 11.5–15.2)
RBC # BLD AUTO: 3.65 M/UL (ref 4.2–5.3)
SODIUM SERPL-SCNC: 143 MMOL/L (ref 136–145)
WBC # BLD AUTO: 4.6 K/UL (ref 4.6–13.2)

## 2018-08-19 PROCEDURE — 74011250636 HC RX REV CODE- 250/636: Performed by: HOSPITALIST

## 2018-08-19 PROCEDURE — 74011636637 HC RX REV CODE- 636/637: Performed by: HOSPITALIST

## 2018-08-19 PROCEDURE — 74011250637 HC RX REV CODE- 250/637: Performed by: INTERNAL MEDICINE

## 2018-08-19 PROCEDURE — 74011250636 HC RX REV CODE- 250/636: Performed by: EMERGENCY MEDICINE

## 2018-08-19 PROCEDURE — 74011636637 HC RX REV CODE- 636/637: Performed by: INTERNAL MEDICINE

## 2018-08-19 PROCEDURE — 80048 BASIC METABOLIC PNL TOTAL CA: CPT | Performed by: INTERNAL MEDICINE

## 2018-08-19 PROCEDURE — 85610 PROTHROMBIN TIME: CPT | Performed by: INTERNAL MEDICINE

## 2018-08-19 PROCEDURE — 65270000029 HC RM PRIVATE

## 2018-08-19 PROCEDURE — 85025 COMPLETE CBC W/AUTO DIFF WBC: CPT | Performed by: INTERNAL MEDICINE

## 2018-08-19 PROCEDURE — 94660 CPAP INITIATION&MGMT: CPT

## 2018-08-19 PROCEDURE — 82962 GLUCOSE BLOOD TEST: CPT

## 2018-08-19 PROCEDURE — 36415 COLL VENOUS BLD VENIPUNCTURE: CPT | Performed by: INTERNAL MEDICINE

## 2018-08-19 PROCEDURE — 74011250637 HC RX REV CODE- 250/637: Performed by: HOSPITALIST

## 2018-08-19 PROCEDURE — 74011000258 HC RX REV CODE- 258: Performed by: EMERGENCY MEDICINE

## 2018-08-19 RX ADMIN — FAMOTIDINE 20 MG: 20 TABLET ORAL at 21:39

## 2018-08-19 RX ADMIN — HEPARIN SODIUM 5000 UNITS: 5000 INJECTION, SOLUTION INTRAVENOUS; SUBCUTANEOUS at 18:42

## 2018-08-19 RX ADMIN — GABAPENTIN 600 MG: 300 CAPSULE ORAL at 17:23

## 2018-08-19 RX ADMIN — INSULIN LISPRO 9 UNITS: 100 INJECTION, SOLUTION INTRAVENOUS; SUBCUTANEOUS at 21:40

## 2018-08-19 RX ADMIN — INSULIN LISPRO 5 UNITS: 100 INJECTION, SOLUTION INTRAVENOUS; SUBCUTANEOUS at 12:09

## 2018-08-19 RX ADMIN — CARVEDILOL 6.25 MG: 6.25 TABLET, FILM COATED ORAL at 09:20

## 2018-08-19 RX ADMIN — ACETAMINOPHEN 650 MG: 325 TABLET, FILM COATED ORAL at 16:18

## 2018-08-19 RX ADMIN — FERROUS SULFATE TAB 325 MG (65 MG ELEMENTAL FE) 325 MG: 325 (65 FE) TAB at 16:19

## 2018-08-19 RX ADMIN — GABAPENTIN 600 MG: 300 CAPSULE ORAL at 09:19

## 2018-08-19 RX ADMIN — BACLOFEN 10 MG: 10 TABLET ORAL at 09:20

## 2018-08-19 RX ADMIN — METRONIDAZOLE 500 MG: 500 INJECTION, SOLUTION INTRAVENOUS at 12:00

## 2018-08-19 RX ADMIN — HEPARIN SODIUM 5000 UNITS: 5000 INJECTION, SOLUTION INTRAVENOUS; SUBCUTANEOUS at 02:30

## 2018-08-19 RX ADMIN — DAKIN'S SOLUTION 0.125% (QUARTER STRENGTH): 0.12 SOLUTION at 23:05

## 2018-08-19 RX ADMIN — INSULIN LISPRO 3 UNITS: 100 INJECTION, SOLUTION INTRAVENOUS; SUBCUTANEOUS at 09:27

## 2018-08-19 RX ADMIN — INSULIN LISPRO 12 UNITS: 100 INJECTION, SOLUTION INTRAVENOUS; SUBCUTANEOUS at 12:08

## 2018-08-19 RX ADMIN — DAKIN'S SOLUTION 0.125% (QUARTER STRENGTH): 0.12 SOLUTION at 12:01

## 2018-08-19 RX ADMIN — AZTREONAM 2 G: 2 INJECTION, POWDER, LYOPHILIZED, FOR SOLUTION INTRAMUSCULAR; INTRAVENOUS at 12:05

## 2018-08-19 RX ADMIN — INSULIN GLARGINE 10 UNITS: 100 INJECTION, SOLUTION SUBCUTANEOUS at 21:49

## 2018-08-19 RX ADMIN — FERROUS SULFATE TAB 325 MG (65 MG ELEMENTAL FE) 325 MG: 325 (65 FE) TAB at 09:20

## 2018-08-19 RX ADMIN — AZTREONAM 2 G: 2 INJECTION, POWDER, LYOPHILIZED, FOR SOLUTION INTRAMUSCULAR; INTRAVENOUS at 03:18

## 2018-08-19 RX ADMIN — DIPHENHYDRAMINE HYDROCHLORIDE 12.5 MG: 25 SOLUTION ORAL at 16:59

## 2018-08-19 RX ADMIN — FOLIC ACID 1 MG: 1 TABLET ORAL at 09:20

## 2018-08-19 RX ADMIN — INSULIN LISPRO 5 UNITS: 100 INJECTION, SOLUTION INTRAVENOUS; SUBCUTANEOUS at 09:28

## 2018-08-19 RX ADMIN — VANCOMYCIN HYDROCHLORIDE 1250 MG: 10 INJECTION, POWDER, LYOPHILIZED, FOR SOLUTION INTRAVENOUS at 16:59

## 2018-08-19 RX ADMIN — PRAVASTATIN SODIUM 40 MG: 20 TABLET ORAL at 21:39

## 2018-08-19 RX ADMIN — FUROSEMIDE 40 MG: 40 TABLET ORAL at 09:20

## 2018-08-19 RX ADMIN — INSULIN LISPRO 5 UNITS: 100 INJECTION, SOLUTION INTRAVENOUS; SUBCUTANEOUS at 19:02

## 2018-08-19 RX ADMIN — AZTREONAM 2 G: 2 INJECTION, POWDER, LYOPHILIZED, FOR SOLUTION INTRAMUSCULAR; INTRAVENOUS at 21:37

## 2018-08-19 RX ADMIN — CARVEDILOL 6.25 MG: 6.25 TABLET, FILM COATED ORAL at 16:19

## 2018-08-19 RX ADMIN — METRONIDAZOLE 500 MG: 500 INJECTION, SOLUTION INTRAVENOUS at 23:03

## 2018-08-19 RX ADMIN — HEPARIN SODIUM 5000 UNITS: 5000 INJECTION, SOLUTION INTRAVENOUS; SUBCUTANEOUS at 09:26

## 2018-08-19 NOTE — PROGRESS NOTES
[facility] Hospitalist Group  Progress Note    Patient: Jayne Garza Age: 76 y.o. : 1950 MR#: [id number] SSN: [id number]  Date/Time: 2018     Subjective:     Review of systems  In bed   No distress   Comfortable   No CP  No NVD     Assessment/Plan:   1. Osteomyelitis right foot   2 HTN  3 DM2  4 HLD   5 H/o Iliopsoas abscess with paraplegia   6 Chronic Urinary retention on Indwelling cath   7 H/o PPM h/o Afib - not on anticoagulation - due to severe bleed/hematoma   8 H/o CVA   9 chronic systolic Heart failure ( overview - Her very last echocardiogram in 2015 showed that her ejection fraction was still very mildly decreased at 45-50%.  She did have a biventricular AICD placed, but due to trauma and infection in that area that had to be removed and now she has a biventricular pacemaker with a pulse generator residing in her left upper quadrant, placed back in 2012. )        PLAN   - for possible surgery tomorrow , keep NPO after MN - Podiatry to follow   - Continue antibiotics coverage   - Poor DM control - d/w patient and she is advised to check blood glucose more frequently and use SSI , can not adjust insulin now as she will be NPO for procedure       Case discussed with:  [x]Patient  [x]Family  []Nursing  []Case Management  DVT Prophylaxis:  []Lovenox  []Hep SQ  []SCDs  []Coumadin   []On Heparin gtt          Objective:   VS:   Visit Vitals    /62 (BP 1 Location: Right arm, BP Patient Position: At rest)    Pulse 64    Temp 97.8 °F (36.6 °C)    Resp 18    Ht 5' 7\" (1.702 m)    Wt 107.5 kg (237 lb 1.6 oz)    SpO2 100%    BMI 37.14 kg/m2      Tmax/24hrs: Temp (24hrs), Av.7 °F (36.5 °C), Min:97.3 °F (36.3 °C), Max:97.9 °F (36.6 °C)  IOBRIEF    Intake/Output Summary (Last 24 hours) at 18 0958  Last data filed at 18 0916   Gross per 24 hour   Intake              440 ml   Output             5950 ml   Net            -5510 ml General:  Alert, cooperative, no acute distress   Cardiovascular: S1S2 - regular , No Murmur   Pulmonary: Equal expansion , No Use of accessory muscles , No Rales No Rhonchi    GI:  +BS in all four quadrants, soft, non-tender  Extremities:  No edema; 2+ dorsalis pedis pulses bilaterally  Neuro: Alert and oriented X 2.        Medications:   Current Facility-Administered Medications   Medication Dose Route Frequency    vancomycin (VANCOCIN) 1,250 mg in 0.9% sodium chloride 250 mL IVPB  1,250 mg IntraVENous Q24H    sodium hypochlorite (QUARTER STRENGTH DAKIN'S) 0.125% irrigation (bottle)   Topical BID    aztreonam (AZACTAM) 2 g in 0.9% sodium chloride (MBP/ADV) 100 mL MBP  2 g IntraVENous Q8H    metroNIDAZOLE (FLAGYL) IVPB premix 500 mg  500 mg IntraVENous Q12H    carvedilol (COREG) tablet 6.25 mg  6.25 mg Oral BID WITH MEALS    baclofen (LIORESAL) tablet 10 mg  10 mg Oral DAILY    famotidine (PEPCID) tablet 20 mg  20 mg Oral QHS    ferrous sulfate tablet 325 mg  325 mg Oral BID WITH MEALS    folic acid (FOLVITE) tablet 1 mg  1 mg Oral DAILY    furosemide (LASIX) tablet 40 mg  40 mg Oral DAILY    gabapentin (NEURONTIN) capsule 600 mg  600 mg Oral BID    insulin lispro (HUMALOG) injection 5 Units  5 Units SubCUTAneous TIDAC    pravastatin (PRAVACHOL) tablet 40 mg  40 mg Oral QHS    ondansetron (ZOFRAN) injection 4 mg  4 mg IntraVENous Q6H PRN    0.9% sodium chloride infusion  75 mL/hr IntraVENous CONTINUOUS    heparin (porcine) injection 5,000 Units  5,000 Units SubCUTAneous Q8H    acetaminophen (TYLENOL) tablet 650 mg  650 mg Oral Q6H PRN    glucose chewable tablet 16 g  4 Tab Oral PRN    glucagon (GLUCAGEN) injection 1 mg  1 mg IntraMUSCular PRN    dextrose (D50W) injection syrg 12.5-25 g  25-50 mL IntraVENous PRN    insulin lispro (HUMALOG) injection   SubCUTAneous AC&HS    insulin glargine (LANTUS) injection 10 Units  10 Units SubCUTAneous QHS    diphenhydrAMINE (BENADRYL) 12.5 mg/5 mL oral elixir 12.5 mg  12.5 mg Oral Q6H PRN       Labs:    Recent Labs      08/19/18   0116  08/17/18   0240  08/16/18   1342   WBC  4.6  6.0  7.3   HGB  10.3*  10.2*  10.8*   HCT  29.9*  30.6*  30.9*   PLT  181  188  171     Recent Labs      08/19/18   0116  08/18/18   0315  08/17/18   0240  08/16/18   1342   NA  143  143  142  141   K  3.9  3.5  3.4*  3.8   CL  111*  110*  106  113*   CO2  25  26  28  22   GLU  135*  149*  204*  276*   BUN  22*  25*  32*  20*   CREA  1.14  1.30  1.39*  0.91   CA  8.7  8.2*  8.2*  6.0*   ALB   --    --   2.5*  2.0*   SGOT   --    --   14*  39*   ALT   --    --   17  15   INR  1.1   --    --    --          Signed By: Teri Zavala MD     August 19, 2018

## 2018-08-19 NOTE — ROUTINE PROCESS
Bedside and Verbal shift change report given to Jaswinder Mendez RN (oncoming nurse) by US Brooks RN (offgoing nurse). Report included the following information SBAR, Kardex, Intake/Output and MAR.

## 2018-08-19 NOTE — PROGRESS NOTES
Seen at bedside awake and alert. CAT scan shows osteitis heel bone.   Plan for debridement and application wound vac in AM.

## 2018-08-19 NOTE — PROGRESS NOTES
Patients arrives from [facility] to room 506, alert awake and oriented, Rfoot with a dressings dry and intact, CMS+. Patients is wheelchair. No s/sofdistress or sob.  bound

## 2018-08-20 ENCOUNTER — ANESTHESIA (OUTPATIENT)
Dept: SURGERY | Age: 68
DRG: 629 | End: 2018-08-20
Payer: COMMERCIAL

## 2018-08-20 ENCOUNTER — ANESTHESIA EVENT (OUTPATIENT)
Dept: SURGERY | Age: 68
DRG: 629 | End: 2018-08-20
Payer: COMMERCIAL

## 2018-08-20 LAB
ANION GAP SERPL CALC-SCNC: 7 MMOL/L (ref 3–18)
BASOPHILS # BLD: 0 K/UL (ref 0–0.1)
BASOPHILS NFR BLD: 1 % (ref 0–2)
BUN SERPL-MCNC: 19 MG/DL (ref 7–18)
BUN/CREAT SERPL: 16 (ref 12–20)
CALCIUM SERPL-MCNC: 8.6 MG/DL (ref 8.5–10.1)
CHLORIDE SERPL-SCNC: 110 MMOL/L (ref 100–108)
CO2 SERPL-SCNC: 27 MMOL/L (ref 21–32)
CREAT SERPL-MCNC: 1.18 MG/DL (ref 0.6–1.3)
DIFFERENTIAL METHOD BLD: ABNORMAL
EOSINOPHIL # BLD: 0.2 K/UL (ref 0–0.4)
EOSINOPHIL NFR BLD: 4 % (ref 0–5)
ERYTHROCYTE [DISTWIDTH] IN BLOOD BY AUTOMATED COUNT: 15.1 % (ref 11.6–14.5)
GLUCOSE BLD STRIP.AUTO-MCNC: 123 MG/DL (ref 70–110)
GLUCOSE BLD STRIP.AUTO-MCNC: 129 MG/DL (ref 70–110)
GLUCOSE BLD STRIP.AUTO-MCNC: 148 MG/DL (ref 70–110)
GLUCOSE BLD STRIP.AUTO-MCNC: 150 MG/DL (ref 70–110)
GLUCOSE BLD STRIP.AUTO-MCNC: 166 MG/DL (ref 70–110)
GLUCOSE BLD STRIP.AUTO-MCNC: 179 MG/DL (ref 70–110)
GLUCOSE BLD STRIP.AUTO-MCNC: 292 MG/DL (ref 70–110)
GLUCOSE BLD STRIP.AUTO-MCNC: 309 MG/DL (ref 70–110)
GLUCOSE SERPL-MCNC: 120 MG/DL (ref 74–99)
HCT VFR BLD AUTO: 31.1 % (ref 35–45)
HGB BLD-MCNC: 10.5 G/DL (ref 12–16)
LYMPHOCYTES # BLD: 1.1 K/UL (ref 0.9–3.6)
LYMPHOCYTES NFR BLD: 22 % (ref 21–52)
MCH RBC QN AUTO: 27.7 PG (ref 24–34)
MCHC RBC AUTO-ENTMCNC: 33.8 G/DL (ref 31–37)
MCV RBC AUTO: 82.1 FL (ref 74–97)
MONOCYTES # BLD: 0.5 K/UL (ref 0.05–1.2)
MONOCYTES NFR BLD: 9 % (ref 3–10)
NEUTS SEG # BLD: 3.3 K/UL (ref 1.8–8)
NEUTS SEG NFR BLD: 64 % (ref 40–73)
PLATELET # BLD AUTO: 177 K/UL (ref 135–420)
PMV BLD AUTO: 9.3 FL (ref 9.2–11.8)
POTASSIUM SERPL-SCNC: 4 MMOL/L (ref 3.5–5.5)
RBC # BLD AUTO: 3.79 M/UL (ref 4.2–5.3)
SODIUM SERPL-SCNC: 144 MMOL/L (ref 136–145)
WBC # BLD AUTO: 5.1 K/UL (ref 4.6–13.2)

## 2018-08-20 PROCEDURE — 74011000258 HC RX REV CODE- 258: Performed by: EMERGENCY MEDICINE

## 2018-08-20 PROCEDURE — 74011250636 HC RX REV CODE- 250/636

## 2018-08-20 PROCEDURE — 88304 TISSUE EXAM BY PATHOLOGIST: CPT | Performed by: PODIATRIST

## 2018-08-20 PROCEDURE — 65270000029 HC RM PRIVATE

## 2018-08-20 PROCEDURE — 74011250636 HC RX REV CODE- 250/636: Performed by: EMERGENCY MEDICINE

## 2018-08-20 PROCEDURE — 82962 GLUCOSE BLOOD TEST: CPT

## 2018-08-20 PROCEDURE — 74011636637 HC RX REV CODE- 636/637: Performed by: INTERNAL MEDICINE

## 2018-08-20 PROCEDURE — 76010000138 HC OR TIME 0.5 TO 1 HR: Performed by: PODIATRIST

## 2018-08-20 PROCEDURE — 87075 CULTR BACTERIA EXCEPT BLOOD: CPT | Performed by: PODIATRIST

## 2018-08-20 PROCEDURE — 77030020782 HC GWN BAIR PAWS FLX 3M -B: Performed by: PODIATRIST

## 2018-08-20 PROCEDURE — 77030018836 HC SOL IRR NACL ICUM -A: Performed by: PODIATRIST

## 2018-08-20 PROCEDURE — 74011250636 HC RX REV CODE- 250/636: Performed by: HOSPITALIST

## 2018-08-20 PROCEDURE — 85025 COMPLETE CBC W/AUTO DIFF WBC: CPT | Performed by: INTERNAL MEDICINE

## 2018-08-20 PROCEDURE — 74011250637 HC RX REV CODE- 250/637: Performed by: HOSPITALIST

## 2018-08-20 PROCEDURE — 74011000250 HC RX REV CODE- 250: Performed by: PODIATRIST

## 2018-08-20 PROCEDURE — 88311 DECALCIFY TISSUE: CPT | Performed by: PODIATRIST

## 2018-08-20 PROCEDURE — 76060000032 HC ANESTHESIA 0.5 TO 1 HR: Performed by: PODIATRIST

## 2018-08-20 PROCEDURE — 77030019934 HC DRSG VAC ASST KCON -B: Performed by: PODIATRIST

## 2018-08-20 PROCEDURE — 36415 COLL VENOUS BLD VENIPUNCTURE: CPT | Performed by: INTERNAL MEDICINE

## 2018-08-20 PROCEDURE — 74011250636 HC RX REV CODE- 250/636: Performed by: PODIATRIST

## 2018-08-20 PROCEDURE — 74011250636 HC RX REV CODE- 250/636: Performed by: ANESTHESIOLOGY

## 2018-08-20 PROCEDURE — 74011250637 HC RX REV CODE- 250/637: Performed by: ANESTHESIOLOGY

## 2018-08-20 PROCEDURE — 74011000272 HC RX REV CODE- 272: Performed by: PODIATRIST

## 2018-08-20 PROCEDURE — 74011000258 HC RX REV CODE- 258

## 2018-08-20 PROCEDURE — 94660 CPAP INITIATION&MGMT: CPT

## 2018-08-20 PROCEDURE — 76210000006 HC OR PH I REC 0.5 TO 1 HR: Performed by: PODIATRIST

## 2018-08-20 PROCEDURE — 0QBL0ZZ EXCISION OF RIGHT TARSAL, OPEN APPROACH: ICD-10-PCS | Performed by: PODIATRIST

## 2018-08-20 PROCEDURE — 80048 BASIC METABOLIC PNL TOTAL CA: CPT | Performed by: INTERNAL MEDICINE

## 2018-08-20 PROCEDURE — 74011636637 HC RX REV CODE- 636/637: Performed by: NURSE ANESTHETIST, CERTIFIED REGISTERED

## 2018-08-20 PROCEDURE — 74011250637 HC RX REV CODE- 250/637: Performed by: INTERNAL MEDICINE

## 2018-08-20 PROCEDURE — 87070 CULTURE OTHR SPECIMN AEROBIC: CPT | Performed by: PODIATRIST

## 2018-08-20 RX ORDER — PROPOFOL 10 MG/ML
INJECTION, EMULSION INTRAVENOUS
Status: DISCONTINUED | OUTPATIENT
Start: 2018-08-20 | End: 2018-08-20 | Stop reason: HOSPADM

## 2018-08-20 RX ORDER — DIPHENHYDRAMINE HCL 12.5MG/5ML
12.5 ELIXIR ORAL
Status: DISCONTINUED | OUTPATIENT
Start: 2018-08-20 | End: 2018-08-24 | Stop reason: HOSPADM

## 2018-08-20 RX ORDER — DEXTROSE 50 % IN WATER (D50W) INTRAVENOUS SYRINGE
25-50 AS NEEDED
Status: DISCONTINUED | OUTPATIENT
Start: 2018-08-20 | End: 2018-08-20 | Stop reason: HOSPADM

## 2018-08-20 RX ORDER — FAMOTIDINE 20 MG/1
20 TABLET, FILM COATED ORAL ONCE
Status: COMPLETED | OUTPATIENT
Start: 2018-08-20 | End: 2018-08-20

## 2018-08-20 RX ORDER — SODIUM CHLORIDE, SODIUM LACTATE, POTASSIUM CHLORIDE, CALCIUM CHLORIDE 600; 310; 30; 20 MG/100ML; MG/100ML; MG/100ML; MG/100ML
75 INJECTION, SOLUTION INTRAVENOUS CONTINUOUS
Status: DISCONTINUED | OUTPATIENT
Start: 2018-08-20 | End: 2018-08-20 | Stop reason: HOSPADM

## 2018-08-20 RX ORDER — LIDOCAINE HYDROCHLORIDE 20 MG/ML
INJECTION, SOLUTION EPIDURAL; INFILTRATION; INTRACAUDAL; PERINEURAL AS NEEDED
Status: DISCONTINUED | OUTPATIENT
Start: 2018-08-20 | End: 2018-08-20 | Stop reason: HOSPADM

## 2018-08-20 RX ORDER — INSULIN LISPRO 100 [IU]/ML
INJECTION, SOLUTION INTRAVENOUS; SUBCUTANEOUS ONCE
Status: COMPLETED | OUTPATIENT
Start: 2018-08-20 | End: 2018-08-20

## 2018-08-20 RX ORDER — SODIUM CHLORIDE 0.9 % (FLUSH) 0.9 %
5-10 SYRINGE (ML) INJECTION AS NEEDED
Status: DISCONTINUED | OUTPATIENT
Start: 2018-08-20 | End: 2018-08-20 | Stop reason: HOSPADM

## 2018-08-20 RX ORDER — MAGNESIUM SULFATE 100 %
4 CRYSTALS MISCELLANEOUS AS NEEDED
Status: DISCONTINUED | OUTPATIENT
Start: 2018-08-20 | End: 2018-08-20 | Stop reason: HOSPADM

## 2018-08-20 RX ORDER — SODIUM CHLORIDE, SODIUM LACTATE, POTASSIUM CHLORIDE, CALCIUM CHLORIDE 600; 310; 30; 20 MG/100ML; MG/100ML; MG/100ML; MG/100ML
100 INJECTION, SOLUTION INTRAVENOUS CONTINUOUS
Status: DISCONTINUED | OUTPATIENT
Start: 2018-08-20 | End: 2018-08-21

## 2018-08-20 RX ADMIN — AZTREONAM 2 G: 2 INJECTION, POWDER, LYOPHILIZED, FOR SOLUTION INTRAMUSCULAR; INTRAVENOUS at 05:24

## 2018-08-20 RX ADMIN — METRONIDAZOLE 500 MG: 500 INJECTION, SOLUTION INTRAVENOUS at 21:51

## 2018-08-20 RX ADMIN — FERROUS SULFATE TAB 325 MG (65 MG ELEMENTAL FE) 325 MG: 325 (65 FE) TAB at 16:56

## 2018-08-20 RX ADMIN — CARVEDILOL 6.25 MG: 6.25 TABLET, FILM COATED ORAL at 09:14

## 2018-08-20 RX ADMIN — AZTREONAM 2 G: 2 INJECTION, POWDER, LYOPHILIZED, FOR SOLUTION INTRAMUSCULAR; INTRAVENOUS at 20:37

## 2018-08-20 RX ADMIN — CARVEDILOL 6.25 MG: 6.25 TABLET, FILM COATED ORAL at 16:56

## 2018-08-20 RX ADMIN — HEPARIN SODIUM 5000 UNITS: 5000 INJECTION, SOLUTION INTRAVENOUS; SUBCUTANEOUS at 16:57

## 2018-08-20 RX ADMIN — FOLIC ACID 1 MG: 1 TABLET ORAL at 15:47

## 2018-08-20 RX ADMIN — FUROSEMIDE 40 MG: 40 TABLET ORAL at 15:47

## 2018-08-20 RX ADMIN — INSULIN GLARGINE 10 UNITS: 100 INJECTION, SOLUTION SUBCUTANEOUS at 22:09

## 2018-08-20 RX ADMIN — PRAVASTATIN SODIUM 40 MG: 20 TABLET ORAL at 21:51

## 2018-08-20 RX ADMIN — INSULIN LISPRO 3 UNITS: 100 INJECTION, SOLUTION INTRAVENOUS; SUBCUTANEOUS at 14:46

## 2018-08-20 RX ADMIN — DIPHENHYDRAMINE HYDROCHLORIDE 12.5 MG: 25 SOLUTION ORAL at 21:41

## 2018-08-20 RX ADMIN — PROPOFOL 100 MCG/KG/MIN: 10 INJECTION, EMULSION INTRAVENOUS at 14:03

## 2018-08-20 RX ADMIN — BACLOFEN 10 MG: 10 TABLET ORAL at 15:47

## 2018-08-20 RX ADMIN — GABAPENTIN 600 MG: 300 CAPSULE ORAL at 15:47

## 2018-08-20 RX ADMIN — INSULIN LISPRO 9 UNITS: 100 INJECTION, SOLUTION INTRAVENOUS; SUBCUTANEOUS at 22:04

## 2018-08-20 RX ADMIN — AZTREONAM 2 G: 2 INJECTION, POWDER, LYOPHILIZED, FOR SOLUTION INTRAMUSCULAR; INTRAVENOUS at 13:55

## 2018-08-20 RX ADMIN — VANCOMYCIN HYDROCHLORIDE 1250 MG: 10 INJECTION, POWDER, LYOPHILIZED, FOR SOLUTION INTRAVENOUS at 16:54

## 2018-08-20 RX ADMIN — FERROUS SULFATE TAB 325 MG (65 MG ELEMENTAL FE) 325 MG: 325 (65 FE) TAB at 15:47

## 2018-08-20 RX ADMIN — FAMOTIDINE 20 MG: 20 TABLET ORAL at 21:51

## 2018-08-20 RX ADMIN — FAMOTIDINE 20 MG: 20 TABLET ORAL at 15:51

## 2018-08-20 RX ADMIN — SODIUM CHLORIDE, SODIUM LACTATE, POTASSIUM CHLORIDE, AND CALCIUM CHLORIDE 100 ML/HR: 600; 310; 30; 20 INJECTION, SOLUTION INTRAVENOUS at 15:56

## 2018-08-20 NOTE — ROUTINE PROCESS
Bedside and Verbal shift change report given to Connie RN (oncoming nurse) by Rosalio Griffith RN (offgoing nurse). Report included the following information SBAR, Kardex, MAR and Recent Results. SITUATION:    Code Status: Full Code   Reason for Admission: Osteomyelitis ([facility] [address]   Foot osteomyelitis, right [facility])    [facility] day: 4   Problem List:       Hospital Problems  Date Reviewed: 8/17/2018          Codes Class Noted POA    Osteomyelitis ([facility] [address] ICD-10-CM: M86.9  ICD-9-CM: 730.20  8/16/2018 Unknown        Foot osteomyelitis, right ([facility] [address] ICD-10-CM: M86.9  ICD-9-CM: 730.27  8/16/2018 Unknown              BACKGROUND:    Past Medical History:   Past Medical History:   Diagnosis Date    Acute paraplegia ([facility] [address] 4/20/2017    Benign hypertensive heart disease with systolic CHF, NYHA class 2 ([facility] [address] 9/5/2012    Biventricular implantable cardioverter-defibrillator in situ 04/28/2005    Upgraded to BiV AICD; gen change 4/2008; pocket revision 10/2009; Abdominal - done on 8/22/2012 by Dr. Milind Kim Cardiac cath 08/15/1996    Patent coronaries. Elev LVEDP. EF 50-55%.  Cardiac echocardiogram 06/23/2015    Ltd study. EF 45-50%. Mild, diffuse hypk. Severe apical hypk. No mass or thrombus was clearly identified, although imaging was suboptimal.      Cardiac nuclear imaging test 06/19/2015    Fixed distal apical, distal septal defect more likely due to RV pacing than prior infarct. No ischemia. EF 46%. RWMA c/w RV pacing. Nondiagnostic EKG on pharm stress test.      Cardiovascular lower extremity venous duplex 09/04/2012    Acute, non-occlusive DVT in CFV on right. No DVT on left. No superficial thrombosis bilaterally.  Cardiovascular upper extremity venous duplex 08/27/2012    DVT in axillary vein on left. Left subclavian was not visualized.     Chronic anemia 9/5/2012    Chronic systolic heart failure (HCC)     Decreased calculated glomerular filtration rate (GFR) 3/30/2017    Calculated GFR equivalent to that of CKD stage 3 = 30-59 ml/min    Diabetic neuropathy associated with type 2 diabetes mellitus ([facility] [address] 6/28/2011    Difficult airway for intubation 08/22/2012    see anesthesia airway note    Dyslipidemia 6/28/2011    Gout     History of complete heart block 6/28/2011    History of Coumadin therapy     Anticoagulation for DVT of the LUE; Discontinued on 3/30/2017    History of deep venous thrombosis 9/5/2012    Left upper extremity    History of pyelonephritis 3/30/2017    Left bundle branch block (LBBB) on electrocardiogram 6/28/2011    Nonischemic cardiomyopathy ([address] 6/28/2011    Obesity (BMI 35.0-39.9 without comorbidity) 3/13/2017    Obstructive sleep apnea on CPAP 2/7/2012    Psoas abscess, right ([address] 4/20/2017    Psoas hematoma, right, secondary to anticoagulant therapy 3/30/2017    Type 2 diabetes mellitus with diabetic neuropathy ([facility] [address] 6/28/2011         Patient taking anticoagulants no     ASSESSMENT:    Changes in Assessment Throughout Shift: No     Patient has Central Line: no Reasons if yes: no   Patient has Krueger Cath: no Reasons if yes: No      Last Vitals:     Vitals:    08/19/18 1200 08/19/18 1514 08/19/18 2000 08/19/18 2350   BP:  142/82 132/65    Pulse:  76 70    Resp:  18 18    Temp:  98.6 °F (37 °C) 97.7 °F (36.5 °C)    SpO2:  98% 100% 99%   Weight: 107.5 kg (237 lb 1.6 oz)      Height:            IV and DRAINS (will only show if present)   [REMOVED] Peripheral IV 08/16/18 Right Antecubital-Site Assessment: Clean, dry, & intact  Peripheral IV 08/17/18 Left Forearm-Site Assessment: Clean, dry, & intact  [REMOVED] Peripheral IV 08/16/18 Left Wrist-Site Assessment: Clean, dry, & intact     WOUND (if present)   Wound Type:  none   Dressing present Dressing Present : No   Wound Concerns/Notes:  none     PAIN    Pain Assessment    Pain Intensity 1: 0 (08/19/18 4121)    Pain Location 1: Leg    Pain Intervention(s) 1: Medication (see MAR)    Patient Stated Pain Goal: 0  o Interventions for Pain:  none  o Intervention effective: no  o Time of last intervention: 0700   o Reassessment Completed: no      Last 3 Weights:  Last 3 Recorded Weights in this Encounter    08/16/18 1004 08/18/18 0416 08/19/18 1200   Weight: 107 kg (236 lb) 107.5 kg (237 lb 1.6 oz) 107.5 kg (237 lb 1.6 oz)     Weight change:      INTAKE/OUPUT    Current Shift: 08/19 1901 - 08/20 0700  In: -   Out: 2800 [Urine:2800]    Last three shifts: 08/18 0701 - 08/19 1900  In: 955 [P.O.:540; I.V.:415]  Out: 5950 [Urine:5950]     LAB RESULTS     Recent Labs      08/19/18   0116   WBC  4.6   HGB  10.3*   HCT  29.9*   PLT  181        Recent Labs      08/19/18   0116  08/18/18   0315   NA  143  143   K  3.9  3.5   GLU  135*  149*   BUN  22*  25*   CREA  1.14  1.30   CA  8.7  8.2*   INR  1.1   --        RECOMMENDATIONS AND DISCHARGE PLANNING     1. Pending tests/procedures/ Plan of Care or Other Needs: TBD     2. Discharge plan for patient and Needs/Barriers: TBD    3. Estimated Discharge Date: TBD Posted on Whiteboard in Patients Room: no      4. The patient's care plan was reviewed with the oncoming nurse. \"HEALS\" SAFETY CHECK      Fall Risk    Total Score: 3    Safety Measures: Safety Measures: Bed/Chair alarm on, Bed/Chair-Wheels locked, Bed in low position, Call light within reach, Fall prevention (comment), Side rails X 3    A safety check occurred in the patient's room between off going nurse and oncoming nurse listed above.     The safety check included the below items  Area Items   H  High Alert Medications - Verify all high alert medication drips (heparin, PCA, etc.)   E  Equipment - Suction is set up for ALL patients (with yanker)  - Red plugs utilized for all equipment (IV pumps, etc.)  - WOWs wiped down at end of shift.  - Room stocked with oxygen, suction, and other unit-specific supplies   A  Alarms - Bed alarm is set for fall risk patients  - Ensure chair alarm is in place and activated if patient is up in a chair   L  Lines - Check IV for any infiltration  - Krueger bag is empty if patient has a Krueger   - Tubing and IV bags are labeled   S  Safety   - Room is clean, patient is clean, and equipment is clean. - Hallways are clear from equipment besides carts. - Fall bracelet on for fall risk patients  - Ensure room is clear and free of clutter  - Suction is set up for ALL patients (with yanker)  - Hallways are clear from equipment besides carts.    - Isolation precautions followed, supplies available outside room, sign posted     Shmuel Pardo RN

## 2018-08-20 NOTE — PROGRESS NOTES
[facility] Hospitalist Group  Progress Note    Patient: Lianne Harris Age: 76 y.o. : 1950 MR#: [id number] SSN: [id number]  Date/Time: 2018     Subjective:     Review of systems  S/p - foot surgery   Sleepy     Assessment/Plan:   1. Osteomyelitis right foot   2 HTN  3 DM2  4 HLD   5 H/o Iliopsoas abscess with paraplegia   6 Chronic Urinary retention on Indwelling cath   7 H/o PPM h/o Afib - not on anticoagulation - due to severe bleed/hematoma   8 H/o CVA   9 chronic systolic Heart failure ( overview - Her very last echocardiogram in 2015 showed that her ejection fraction was still very mildly decreased at 45-50%.  She did have a biventricular AICD placed, but due to trauma and infection in that area that had to be removed and now she has a biventricular pacemaker with a pulse generator residing in her left upper quadrant, placed back in 2012. )        PLAN   - debriding of right heel   - local wound care   - continue current antibiotics         Case discussed with:  [x]Patient  [x]Family  []Nursing  []Case Management  DVT Prophylaxis:  []Lovenox  []Hep SQ  []SCDs  []Coumadin   []On Heparin gtt          Objective:   VS:   Visit Vitals    /68    Pulse 75    Temp 98.2 °F (36.8 °C)    Resp 15    Ht 5' 7\" (1.702 m)    Wt 107.5 kg (237 lb 1.6 oz)    SpO2 99%    BMI 37.14 kg/m2      Tmax/24hrs: Temp (24hrs), Av.9 °F (36.6 °C), Min:97 °F (36.1 °C), Max:98.6 °F (37 °C)  IOBRIEF    Intake/Output Summary (Last 24 hours) at 18 1457  Last data filed at 18 1439   Gross per 24 hour   Intake                0 ml   Output             4550 ml   Net            -4550 ml       General:  Alert, cooperative, no acute distress   Cardiovascular: S1S2 - regular , No Murmur   Pulmonary: Equal expansion , No Use of accessory muscles , No Rales No Rhonchi    GI:  +BS in all four quadrants, soft, non-tender  Extremities:  No edema; 2+ dorsalis pedis pulses bilaterally  Neuro: Alert and oriented X 2.        Medications:   Current Facility-Administered Medications   Medication Dose Route Frequency    lactated Ringers infusion  100 mL/hr IntraVENous CONTINUOUS    famotidine (PEPCID) tablet 20 mg  20 mg Oral ONCE    sodium chloride (NS) flush 5-10 mL  5-10 mL IntraVENous PRN    glucose chewable tablet 16 g  4 Tab Oral PRN    glucagon (GLUCAGEN) injection 1 mg  1 mg IntraMUSCular PRN    dextrose (D50W) injection syrg 12.5-25 g  25-50 mL IntraVENous PRN    lactated Ringers infusion  75 mL/hr IntraVENous CONTINUOUS    vancomycin (VANCOCIN) 1,250 mg in 0.9% sodium chloride 250 mL IVPB  1,250 mg IntraVENous Q24H    sodium hypochlorite (QUARTER STRENGTH DAKIN'S) 0.125% irrigation (bottle)   Topical BID    aztreonam (AZACTAM) 2 g in 0.9% sodium chloride (MBP/ADV) 100 mL MBP  2 g IntraVENous Q8H    metroNIDAZOLE (FLAGYL) IVPB premix 500 mg  500 mg IntraVENous Q12H    carvedilol (COREG) tablet 6.25 mg  6.25 mg Oral BID WITH MEALS    baclofen (LIORESAL) tablet 10 mg  10 mg Oral DAILY    famotidine (PEPCID) tablet 20 mg  20 mg Oral QHS    ferrous sulfate tablet 325 mg  325 mg Oral BID WITH MEALS    folic acid (FOLVITE) tablet 1 mg  1 mg Oral DAILY    furosemide (LASIX) tablet 40 mg  40 mg Oral DAILY    gabapentin (NEURONTIN) capsule 600 mg  600 mg Oral BID    insulin lispro (HUMALOG) injection 5 Units  5 Units SubCUTAneous TIDAC    pravastatin (PRAVACHOL) tablet 40 mg  40 mg Oral QHS    ondansetron (ZOFRAN) injection 4 mg  4 mg IntraVENous Q6H PRN    0.9% sodium chloride infusion  75 mL/hr IntraVENous CONTINUOUS    heparin (porcine) injection 5,000 Units  5,000 Units SubCUTAneous Q8H    acetaminophen (TYLENOL) tablet 650 mg  650 mg Oral Q6H PRN    glucose chewable tablet 16 g  4 Tab Oral PRN    glucagon (GLUCAGEN) injection 1 mg  1 mg IntraMUSCular PRN    dextrose (D50W) injection syrg 12.5-25 g  25-50 mL IntraVENous PRN    insulin lispro (HUMALOG) injection   SubCUTAneous AC&HS    insulin glargine (LANTUS) injection 10 Units  10 Units SubCUTAneous QHS    diphenhydrAMINE (BENADRYL) 12.5 mg/5 mL oral elixir 12.5 mg  12.5 mg Oral Q6H PRN       Labs:    Recent Labs      08/20/18 0356  08/19/18   0116   WBC  5.1  4.6   HGB  10.5*  10.3*   HCT  31.1*  29.9*   PLT  177  181     Recent Labs      08/20/18 0356  08/19/18   0116  08/18/18   0315   NA  144  143  143   K  4.0  3.9  3.5   CL  110*  111*  110*   CO2  27  25  26   GLU  120*  135*  149*   BUN  19*  22*  25*   CREA  1.18  1.14  1.30   CA  8.6  8.7  8.2*   INR   --   1.1   --          Signed By: Margi Peralta MD     August 20, 2018

## 2018-08-20 NOTE — ROUTINE PROCESS
0253: Pt is stable and sleeping. Chlorhexidine wipe done. Wound wet to dry dressing done. Pt denies pain, nausea or vomiting. Consent forms signed. Abx treatment continues. Will continue with current plan of care.

## 2018-08-20 NOTE — PROGRESS NOTES
Patient has debridement of right heel today. Will continue to follow and assist with d/c planning as d/c needs are identified. LISHA Zafar, Care Manager.

## 2018-08-20 NOTE — PERIOP NOTES
TRANSFER - OUT REPORT:    [facility]oc[facility]  being transferred to  for routine post - op       Report consisted of patients Situation, Background, Assessment and   Recommendations(SBAR). Information from the following report(s) SBAR and MAR was reviewed with the receiving nurse. Lines:   Peripheral IV 08/17/18 Left Forearm (Active)   Site Assessment Clean, dry, & intact 8/20/2018  8:00 AM   Phlebitis Assessment 0 8/20/2018  8:00 AM   Infiltration Assessment 0 8/20/2018  8:00 AM   Dressing Status Clean, dry, & intact 8/20/2018  8:00 AM   Dressing Type Transparent 8/20/2018  8:00 AM   Hub Color/Line Status Blue; Infusing 8/18/2018  9:51 AM   Action Taken Open ports on tubing capped 8/17/2018 10:46 PM   Alcohol Cap Used Yes 8/17/2018 10:46 PM       Peripheral IV 08/20/18 Left Antecubital (Active)   Site Assessment Clean, dry, & intact 8/20/2018  2:29 PM   Phlebitis Assessment 0 8/20/2018  2:29 PM   Infiltration Assessment 0 8/20/2018  2:29 PM   Dressing Status Clean, dry, & intact 8/20/2018  2:29 PM   Dressing Type Tape;Transparent 8/20/2018  2:29 PM   Hub Color/Line Status Pink;Positional;Infusing 8/20/2018  2:29 PM        Opportunity for questions and clarification was provided.       Patient transported with:   [employer]

## 2018-08-20 NOTE — ANESTHESIA PREPROCEDURE EVALUATION
Anesthetic History   No history of anesthetic complications            Review of Systems / Medical History  Patient summary reviewed and pertinent labs reviewed    Pulmonary        Sleep apnea: CPAP           Neuro/Psych   Within defined limits           Cardiovascular            Dysrhythmias       Exercise tolerance: >4 METS     GI/Hepatic/Renal                Endo/Other    Diabetes: well controlled, type 2    Morbid obesity     Other Findings   Comments: Documentation of current medication  Current medications obtained, documented and obtained? YES      Risk Factors for Postoperative nausea/vomiting:       History of postoperative nausea/vomiting? NO       Female? YES       Motion sickness? NO       Intended opioid administration for postoperative analgesia? YES      Smoking Abstinence:  Current Smoker? NO  Elective Surgery? YES  Seen preoperatively by anesthesiologist or proxy prior to day of surgery? YES  Pt abstained from smoking 24 hours prior to anesthesia?  N/A    Preventive care/screening for High Blood Pressure:  Aged 18 years and older: YES  Screened for high blood pressure: YES  Patients with high blood pressure referred to primary care provider   for BP management: YES                 Physical Exam    Airway  Mallampati: III  TM Distance: 4 - 6 cm  Neck ROM: decreased range of motion   Mouth opening: Normal     Cardiovascular    Rhythm: regular  Rate: normal         Dental  No notable dental hx       Pulmonary  Breath sounds clear to auscultation               Abdominal  GI exam deferred       Other Findings            Anesthetic Plan    ASA: 3  Anesthesia type: MAC            Anesthetic plan and risks discussed with: Patient

## 2018-08-20 NOTE — BRIEF OP NOTE
BRIEF OPERATIVE NOTE    Date of Procedure: 8/20/2018   Preoperative Diagnosis: ulcer and infection right heel  Postoperative Diagnosis: ulcer and infection right heel    Procedure(s):  DEBRIDEMENT RIGHT HEEL WITH REMOVAL INFECTED TISSUE AND BONE AND APPLICATION OF WOUND VAC  Surgeon(s) and Role:     * Bridget Butt DPM - Primary         Surgical Assistant: JASON    Surgical Staff:  Circ-1: Steve Dominguez RN  Scrub Tech-1: Letha Goddard  Surg Asst-1: Shy Andersen  Event Time In   Incision Start 1409   Incision Close      Anesthesia: MAC   Estimated Blood Loss: 0  Specimens:   ID Type Source Tests Collected by Time Destination   1 : bone right heel Preservative Heel  Bridget Butt DPM 8/20/2018 1414 Pathology   1 : c/s bone right heel Wound Heel CULTURE, ANAEROBIC, CULTURE, WOUND W Veatrice Riedel, ZACHARY [facility] 8/20/2018 1411 Microbiology      Findings: osteitis   Complications: none  Implants: * No implants in log *

## 2018-08-20 NOTE — WOUND CARE
Physical Exam   Room 506: notified Amos Khan CM of need for home wound vac machine. Wound measurements placed into [employer] [facility]. Wound care orders written.   Mookie ISAACSN, RN, [facility], [address]

## 2018-08-20 NOTE — PROGRESS NOTES
Talked with wound care nurse, and she stated that patient had a wound vac placed in surgery today and will need a home wound vac at discharge. Will continue to follow and assist with d/c planning for home wound vac. LISHA Gambino, Care Manager.

## 2018-08-21 LAB
ANION GAP SERPL CALC-SCNC: 6 MMOL/L (ref 3–18)
BASOPHILS # BLD: 0 K/UL (ref 0–0.1)
BASOPHILS NFR BLD: 0 % (ref 0–2)
BUN SERPL-MCNC: 18 MG/DL (ref 7–18)
BUN/CREAT SERPL: 15 (ref 12–20)
CALCIUM SERPL-MCNC: 8.2 MG/DL (ref 8.5–10.1)
CHLORIDE SERPL-SCNC: 109 MMOL/L (ref 100–108)
CO2 SERPL-SCNC: 26 MMOL/L (ref 21–32)
CREAT SERPL-MCNC: 1.2 MG/DL (ref 0.6–1.3)
CRP SERPL-MCNC: 2.4 MG/DL (ref 0–0.3)
DIFFERENTIAL METHOD BLD: ABNORMAL
EOSINOPHIL # BLD: 0.2 K/UL (ref 0–0.4)
EOSINOPHIL NFR BLD: 3 % (ref 0–5)
ERYTHROCYTE [DISTWIDTH] IN BLOOD BY AUTOMATED COUNT: 15.2 % (ref 11.6–14.5)
GLUCOSE BLD STRIP.AUTO-MCNC: 150 MG/DL (ref 70–110)
GLUCOSE BLD STRIP.AUTO-MCNC: 176 MG/DL (ref 70–110)
GLUCOSE BLD STRIP.AUTO-MCNC: 201 MG/DL (ref 70–110)
GLUCOSE BLD STRIP.AUTO-MCNC: 264 MG/DL (ref 70–110)
GLUCOSE SERPL-MCNC: 146 MG/DL (ref 74–99)
HCT VFR BLD AUTO: 31.3 % (ref 35–45)
HGB BLD-MCNC: 10.5 G/DL (ref 12–16)
LYMPHOCYTES # BLD: 1.2 K/UL (ref 0.9–3.6)
LYMPHOCYTES NFR BLD: 23 % (ref 21–52)
MCH RBC QN AUTO: 27.6 PG (ref 24–34)
MCHC RBC AUTO-ENTMCNC: 33.5 G/DL (ref 31–37)
MCV RBC AUTO: 82.4 FL (ref 74–97)
MONOCYTES # BLD: 0.5 K/UL (ref 0.05–1.2)
MONOCYTES NFR BLD: 9 % (ref 3–10)
NEUTS SEG # BLD: 3.3 K/UL (ref 1.8–8)
NEUTS SEG NFR BLD: 65 % (ref 40–73)
PLATELET # BLD AUTO: 183 K/UL (ref 135–420)
PMV BLD AUTO: 9.3 FL (ref 9.2–11.8)
POTASSIUM SERPL-SCNC: 3.4 MMOL/L (ref 3.5–5.5)
RBC # BLD AUTO: 3.8 M/UL (ref 4.2–5.3)
SODIUM SERPL-SCNC: 141 MMOL/L (ref 136–145)
WBC # BLD AUTO: 5.1 K/UL (ref 4.6–13.2)

## 2018-08-21 PROCEDURE — 74011250636 HC RX REV CODE- 250/636: Performed by: HOSPITALIST

## 2018-08-21 PROCEDURE — 74011250636 HC RX REV CODE- 250/636: Performed by: INTERNAL MEDICINE

## 2018-08-21 PROCEDURE — 65270000029 HC RM PRIVATE

## 2018-08-21 PROCEDURE — 74011250636 HC RX REV CODE- 250/636: Performed by: EMERGENCY MEDICINE

## 2018-08-21 PROCEDURE — 74011250637 HC RX REV CODE- 250/637: Performed by: HOSPITALIST

## 2018-08-21 PROCEDURE — 74011636637 HC RX REV CODE- 636/637: Performed by: INTERNAL MEDICINE

## 2018-08-21 PROCEDURE — 80048 BASIC METABOLIC PNL TOTAL CA: CPT | Performed by: PODIATRIST

## 2018-08-21 PROCEDURE — 74011636637 HC RX REV CODE- 636/637: Performed by: HOSPITALIST

## 2018-08-21 PROCEDURE — 74011250637 HC RX REV CODE- 250/637: Performed by: INTERNAL MEDICINE

## 2018-08-21 PROCEDURE — A4216 STERILE WATER/SALINE, 10 ML: HCPCS | Performed by: INTERNAL MEDICINE

## 2018-08-21 PROCEDURE — 85025 COMPLETE CBC W/AUTO DIFF WBC: CPT | Performed by: PODIATRIST

## 2018-08-21 PROCEDURE — 82962 GLUCOSE BLOOD TEST: CPT

## 2018-08-21 PROCEDURE — 77030027138 HC INCENT SPIROMETER -A

## 2018-08-21 PROCEDURE — 86140 C-REACTIVE PROTEIN: CPT | Performed by: INTERNAL MEDICINE

## 2018-08-21 PROCEDURE — 74011000258 HC RX REV CODE- 258: Performed by: EMERGENCY MEDICINE

## 2018-08-21 PROCEDURE — 36415 COLL VENOUS BLD VENIPUNCTURE: CPT | Performed by: PODIATRIST

## 2018-08-21 RX ORDER — LOPERAMIDE HYDROCHLORIDE 2 MG/1
2 CAPSULE ORAL
Status: DISCONTINUED | OUTPATIENT
Start: 2018-08-21 | End: 2018-08-24 | Stop reason: HOSPADM

## 2018-08-21 RX ORDER — POTASSIUM CHLORIDE 20 MEQ/1
40 TABLET, EXTENDED RELEASE ORAL 3 TIMES DAILY
Status: COMPLETED | OUTPATIENT
Start: 2018-08-21 | End: 2018-08-22

## 2018-08-21 RX ORDER — INSULIN GLARGINE 100 [IU]/ML
14 INJECTION, SOLUTION SUBCUTANEOUS
Status: DISCONTINUED | OUTPATIENT
Start: 2018-08-21 | End: 2018-08-24 | Stop reason: HOSPADM

## 2018-08-21 RX ADMIN — LOPERAMIDE HYDROCHLORIDE 2 MG: 2 CAPSULE ORAL at 19:51

## 2018-08-21 RX ADMIN — INSULIN LISPRO 5 UNITS: 100 INJECTION, SOLUTION INTRAVENOUS; SUBCUTANEOUS at 12:45

## 2018-08-21 RX ADMIN — METRONIDAZOLE 500 MG: 500 INJECTION, SOLUTION INTRAVENOUS at 10:10

## 2018-08-21 RX ADMIN — POTASSIUM CHLORIDE 40 MEQ: 20 TABLET, EXTENDED RELEASE ORAL at 22:03

## 2018-08-21 RX ADMIN — INSULIN LISPRO 5 UNITS: 100 INJECTION, SOLUTION INTRAVENOUS; SUBCUTANEOUS at 08:13

## 2018-08-21 RX ADMIN — INSULIN LISPRO 2 UNITS: 100 INJECTION, SOLUTION INTRAVENOUS; SUBCUTANEOUS at 06:57

## 2018-08-21 RX ADMIN — INSULIN LISPRO 3 UNITS: 100 INJECTION, SOLUTION INTRAVENOUS; SUBCUTANEOUS at 16:43

## 2018-08-21 RX ADMIN — SODIUM CHLORIDE 75 ML/HR: 900 INJECTION, SOLUTION INTRAVENOUS at 02:23

## 2018-08-21 RX ADMIN — GABAPENTIN 600 MG: 300 CAPSULE ORAL at 09:50

## 2018-08-21 RX ADMIN — INSULIN GLARGINE 14 UNITS: 100 INJECTION, SOLUTION SUBCUTANEOUS at 22:04

## 2018-08-21 RX ADMIN — INSULIN LISPRO 6 UNITS: 100 INJECTION, SOLUTION INTRAVENOUS; SUBCUTANEOUS at 12:45

## 2018-08-21 RX ADMIN — METRONIDAZOLE 500 MG: 500 INJECTION, SOLUTION INTRAVENOUS at 22:10

## 2018-08-21 RX ADMIN — HEPARIN SODIUM 5000 UNITS: 5000 INJECTION, SOLUTION INTRAVENOUS; SUBCUTANEOUS at 09:50

## 2018-08-21 RX ADMIN — FUROSEMIDE 40 MG: 40 TABLET ORAL at 09:12

## 2018-08-21 RX ADMIN — CARVEDILOL 6.25 MG: 6.25 TABLET, FILM COATED ORAL at 16:41

## 2018-08-21 RX ADMIN — PRAVASTATIN SODIUM 40 MG: 20 TABLET ORAL at 22:02

## 2018-08-21 RX ADMIN — FERROUS SULFATE TAB 325 MG (65 MG ELEMENTAL FE) 325 MG: 325 (65 FE) TAB at 16:41

## 2018-08-21 RX ADMIN — INSULIN LISPRO 5 UNITS: 100 INJECTION, SOLUTION INTRAVENOUS; SUBCUTANEOUS at 16:43

## 2018-08-21 RX ADMIN — GABAPENTIN 600 MG: 300 CAPSULE ORAL at 17:11

## 2018-08-21 RX ADMIN — HEPARIN SODIUM 5000 UNITS: 5000 INJECTION, SOLUTION INTRAVENOUS; SUBCUTANEOUS at 02:24

## 2018-08-21 RX ADMIN — BACLOFEN 10 MG: 10 TABLET ORAL at 09:12

## 2018-08-21 RX ADMIN — HEPARIN SODIUM 5000 UNITS: 5000 INJECTION, SOLUTION INTRAVENOUS; SUBCUTANEOUS at 17:11

## 2018-08-21 RX ADMIN — INSULIN LISPRO 9 UNITS: 100 INJECTION, SOLUTION INTRAVENOUS; SUBCUTANEOUS at 22:06

## 2018-08-21 RX ADMIN — FOLIC ACID 1 MG: 1 TABLET ORAL at 09:12

## 2018-08-21 RX ADMIN — POTASSIUM CHLORIDE 40 MEQ: 20 TABLET, EXTENDED RELEASE ORAL at 16:41

## 2018-08-21 RX ADMIN — FERROUS SULFATE TAB 325 MG (65 MG ELEMENTAL FE) 325 MG: 325 (65 FE) TAB at 09:12

## 2018-08-21 RX ADMIN — WATER 2 G: 1 INJECTION INTRAMUSCULAR; INTRAVENOUS; SUBCUTANEOUS at 16:42

## 2018-08-21 RX ADMIN — AZTREONAM 2 G: 2 INJECTION, POWDER, LYOPHILIZED, FOR SOLUTION INTRAMUSCULAR; INTRAVENOUS at 05:20

## 2018-08-21 RX ADMIN — CARVEDILOL 6.25 MG: 6.25 TABLET, FILM COATED ORAL at 09:12

## 2018-08-21 RX ADMIN — DIPHENHYDRAMINE HYDROCHLORIDE 12.5 MG: 25 SOLUTION ORAL at 05:20

## 2018-08-21 NOTE — PROGRESS NOTES
[facility] Hospitalist Group  Progress Note    Patient: Reena Mobley Age: 76 y.o. : 1950 MR#: [id number] SSN: [id number]  Date/Time: 2018     Subjective:     Review of systems  S/p - foot surgery   Sleepy     Assessment/Plan:   1. Osteomyelitis right foot   2 HTN  3 DM2  4 HLD   5 H/o Iliopsoas abscess with paraplegia   6 Chronic Urinary retention on Indwelling cath   7 H/o PPM h/o Afib - not on anticoagulation - due to severe bleed/hematoma   8 H/o CVA   9 chronic systolic Heart failure ( overview - Her very last echocardiogram in 2015 showed that her ejection fraction was still very mildly decreased at 45-50%.  She did have a biventricular AICD placed, but due to trauma and infection in that area that had to be removed and now she has a biventricular pacemaker with a pulse generator residing in her left upper quadrant, placed back in 2012. )        PLAN   - s/p debridement/ wound vac   - so far no growth from cultures   - Discontinue antibiotics - local wound care   - patient hs multiple listed allergies - patient tolerated Azactam and vancomycin this admission   - replace and monitor lytes        Case discussed with:  [x]Patient  [x]Family  []Nursing  []Case Management  DVT Prophylaxis:  []Lovenox  []Hep SQ  []SCDs  []Coumadin   []On Heparin gtt          Objective:   VS:   Visit Vitals    BP (!) 155/91 (BP 1 Location: Left arm, BP Patient Position: Supine)    Pulse 74    Temp 97.1 °F (36.2 °C)    Resp 16    Ht 5' 7\" (1.702 m)    Wt 118.3 kg (260 lb 14.4 oz)    SpO2 100%    BMI 40.86 kg/m2      Tmax/24hrs: Temp (24hrs), Av.8 °F (36.6 °C), Min:97.1 °F (36.2 °C), Max:98.5 °F (36.9 °C)  IOBRIEF    Intake/Output Summary (Last 24 hours) at 18 1150  Last data filed at 18 1111   Gross per 24 hour   Intake           6622.5 ml   Output             4251 ml   Net           2371.5 ml       General:  Alert, cooperative, no acute distress Cardiovascular: S1S2 - regular , No Murmur   Pulmonary: Equal expansion , No Use of accessory muscles , No Rales No Rhonchi    GI:  +BS in all four quadrants, soft, non-tender  Extremities:  No edema; 2+ dorsalis pedis pulses bilaterally  Neuro: Alert and oriented X 2.        Medications:   Current Facility-Administered Medications   Medication Dose Route Frequency    insulin glargine (LANTUS) injection 14 Units  14 Units SubCUTAneous QHS    potassium chloride (K-DUR, KLOR-CON) SR tablet 40 mEq  40 mEq Oral TID    lactated Ringers infusion  100 mL/hr IntraVENous CONTINUOUS    diphenhydrAMINE (BENADRYL) 12.5 mg/5 mL oral elixir 12.5 mg  12.5 mg Oral Q6H PRN    metroNIDAZOLE (FLAGYL) IVPB premix 500 mg  500 mg IntraVENous Q12H    carvedilol (COREG) tablet 6.25 mg  6.25 mg Oral BID WITH MEALS    baclofen (LIORESAL) tablet 10 mg  10 mg Oral DAILY    famotidine (PEPCID) tablet 20 mg  20 mg Oral QHS    ferrous sulfate tablet 325 mg  325 mg Oral BID WITH MEALS    folic acid (FOLVITE) tablet 1 mg  1 mg Oral DAILY    furosemide (LASIX) tablet 40 mg  40 mg Oral DAILY    gabapentin (NEURONTIN) capsule 600 mg  600 mg Oral BID    insulin lispro (HUMALOG) injection 5 Units  5 Units SubCUTAneous TIDAC    pravastatin (PRAVACHOL) tablet 40 mg  40 mg Oral QHS    ondansetron (ZOFRAN) injection 4 mg  4 mg IntraVENous Q6H PRN    0.9% sodium chloride infusion  75 mL/hr IntraVENous CONTINUOUS    heparin (porcine) injection 5,000 Units  5,000 Units SubCUTAneous Q8H    acetaminophen (TYLENOL) tablet 650 mg  650 mg Oral Q6H PRN    glucose chewable tablet 16 g  4 Tab Oral PRN    glucagon (GLUCAGEN) injection 1 mg  1 mg IntraMUSCular PRN    dextrose (D50W) injection syrg 12.5-25 g  25-50 mL IntraVENous PRN    insulin lispro (HUMALOG) injection   SubCUTAneous AC&HS       Labs:    Recent Labs      08/21/18   0301  08/20/18   0356  08/19/18   0116   WBC  5.1  5.1  4.6   HGB  10.5*  10.5*  10.3*   HCT  31.3*  31.1*  29.9* PLT  183  177  181     Recent Labs      08/21/18   0301  08/20/18   0356  08/19/18   0116   NA  141  144  143   K  3.4*  4.0  3.9   CL  109*  110*  111*   CO2  26  27  25   GLU  146*  120*  135*   BUN  18  19*  22*   CREA  1.20  1.18  1.14   CA  8.2*  8.6  8.7   INR   --    --   1.1         Signed By: Manav Castle MD     August 21, 2018

## 2018-08-21 NOTE — PROGRESS NOTES
Bedside and Verbal shift change report given to Disha Badillo RN (oncoming nurse) by Jaycee Amezcua RN (offgoing nurse). Report included the following information SBAR, Kardex, OR Summary, Intake/Output and MAR.

## 2018-08-21 NOTE — PROGRESS NOTES
1700:  Pt c/o diarrhea, states three BM today; requests 'something for diarrhea. I usually take Immodium at home\". Dr Kemar Flowers called, updated to pt request for Immodium. New orders received for same. 1945:  Bedside and Verbal shift change report given to Mary (oncoming nurse) by Jeanette Jiménez RN   (offgoing nurse). Report included the following information SBAR, Kardex, Procedure Summary, Intake/Output, MAR, Accordion and Recent Results.

## 2018-08-21 NOTE — PROGRESS NOTES
2038  Received pt in stable condition,   General: lying in bed in supine, not apparent distress  Neuro: AOx4, paraplegic  Cardio: pedal pulses palpable to BLE, denies chest pain  Respiratory: denies shortness of breath, using CPAP  Skin: Dressing to right foot clean, dry and intact, wound vac draining and intact  GI: perez, clear, yellow urine  : denies nausea and vomiting  Mus: bed rest  Bed in low position and call bell within reach. 4560  Alert, NAD, stable condition.

## 2018-08-21 NOTE — PROGRESS NOTES
0730:  Bedside and Verbal shift change report given to Leonor Miranda RN   (oncoming nurse) by Flaquita Smith (offgoing nurse). Report included the following information SBAR, Kardex, Intake/Output, MAR, Accordion and Recent Results. Patient has left 5South unit prior to this shift change to go to OR. Await patients return to 5S for assessment. 1043:  Patient will not return post op to this 5S unit, but will go to ICU. Report called to DANIEL Olivia at ICU with information as relayed to this RN at 0730.

## 2018-08-21 NOTE — CONSULTS
Infectious Disease Consultation Note    Requested by: Dr. Valente Dolan    Reason: right heel infection    Current abx Prior abx   Metronidazole since 8/16 Aztreonam, vancomycin 8/16-8/21     Lines:       Assessment :    76 y.o. female who has a h/o  uncontrolled DM2 (last hgbA1C 10.2 on 8/17/18), HTN, CHF, CAD and paraplegia who presented from her Podiatrist office on 8/16/18 due to worsening wound on her right heel.          hospitalization (4/20/17-5/15/17) for Sepsis  secondary to group B streptococcus bloodstream infection (positive blood cultures 4/20, negative blood cultures 4/21). Most likely source of bloodstream infection is infected right psoas hematoma/abscess. 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        bilateral LE dvt - s/p IVC filter placement 5/11     Clinical presentation c/w infected right heel ulcer, acute osteomyelitis right calcaneum. S/p Radical debridement of necrotic tissue and debridement of heel bone. Wound irrigation, application of wound VAC on 8/20/18. Intra op cultures negative. Recommendations:      1. Continue metronidazole. Start ceftriaxone   2. F/u intra op cultures  3. Will make final abx decisions based on intra op cultures, clinical course  4. Obtain baseline crp         Advance Care planning: full code: discussed  with patient/surrogate decision maker:Tomi Gonzalez: [phone]    Thank you for consultation request. Above plan was discussed in details with patient, family and dr Triny Howard. Please call me if any further questions or concerns. Will continue to participate in the care of this patient. HPI:     76 y.o. female who has a h/o  uncontrolled DM2 (last hgbA1C 10.2 on 8/17/18), HTN, CHF, CAD and paraplegia who presented from her Podiatrist office on 8/16/18 due to worsening wound on her right heel.      Patient is known to me from prior inpatient consultation at [facility]. She had h/o sepsis due to group strep bacteremia, infected right psoas hematoma/abscess with subsequent paraplegia since 4/20/17 due to spinal cord infarct, septic thrombophlebitis. She has been wheelchair bound since then. She noted a small ulcer right heel since December. Pt mentions she developed the wound from hitting her foot on a curb when she was getting fitted for a wheel chair. She says the wound on her R heel has been hard to heal with occasional foul smelling discharge. She was followed up by dr. Carroll Lal (podiatrist) and was managed with wound care, abx. Last abx was one month ago. Says she has been getting chills intermittently but the swelling in her right leg is worse. She also started having increased drainage right heel. She came to ed with these complaints on 8/16. X ray right heel didn't reveal evidence of osteomyelitis. She was evaluated by podiatrist. Underwent Radical debridement of necrotic tissue and debridement of heel bone. Wound irrigation, application of wound VAC on 8/20/18. Intra op cultures negative. I have been consulted for further recommendations. Patient denies headaches, visual disturbances, sore throat, runny nose, earaches, cp, sob, cough, abdominal pain, diarrhea, burning micturition, or weakness in extremities. she denies back pain/flank pain. she denies recent sick contacts. No h/o recent travel. No known h/o MRSA colonization or infection in the past.       Past Medical History:   Diagnosis Date    Acute paraplegia ([facility] [address] 4/20/2017    Benign hypertensive heart disease with systolic CHF, NYHA class 2 ([address] 9/5/2012    Biventricular implantable cardioverter-defibrillator in situ 04/28/2005    Upgraded to BiV AICD; gen change 4/2008; pocket revision 10/2009; Abdominal - done on 8/22/2012 by Dr. Rich Potosi Cardiac cath 08/15/1996    Patent coronaries. Elev LVEDP. EF 50-55%.  Cardiac echocardiogram 06/23/2015    Ltd study. EF 45-50%. Mild, diffuse hypk. Severe apical hypk. No mass or thrombus was clearly identified, although imaging was suboptimal.      Cardiac nuclear imaging test 06/19/2015    Fixed distal apical, distal septal defect more likely due to RV pacing than prior infarct. No ischemia. EF 46%. RWMA c/w RV pacing. Nondiagnostic EKG on pharm stress test.      Cardiovascular lower extremity venous duplex 09/04/2012    Acute, non-occlusive DVT in CFV on right. No DVT on left. No superficial thrombosis bilaterally.  Cardiovascular upper extremity venous duplex 08/27/2012    DVT in axillary vein on left. Left subclavian was not visualized.     Chronic anemia 9/5/2012    Chronic systolic heart failure (HCC)     Decreased calculated glomerular filtration rate (GFR) 3/30/2017    Calculated GFR equivalent to that of CKD stage 3 = 30-59 ml/min    Diabetic neuropathy associated with type 2 diabetes mellitus ([address] 6/28/2011    Difficult airway for intubation 08/22/2012    see anesthesia airway note    Dyslipidemia 6/28/2011    Gout     History of complete heart block 6/28/2011    History of Coumadin therapy     Anticoagulation for DVT of the LUE; Discontinued on 3/30/2017    History of deep venous thrombosis 9/5/2012    Left upper extremity    History of pyelonephritis 3/30/2017    Left bundle branch block (LBBB) on electrocardiogram 6/28/2011    Nonischemic cardiomyopathy ([address] 6/28/2011    Obesity (BMI 35.0-39.9 without comorbidity) 3/13/2017    Obstructive sleep apnea on CPAP 2/7/2012    Psoas abscess, right ([address] 4/20/2017    Psoas hematoma, right, secondary to anticoagulant therapy 3/30/2017    Type 2 diabetes mellitus with diabetic neuropathy ([address] 6/28/2011       Past Surgical History:   Procedure Laterality Date    HX CARPAL TUNNEL RELEASE  4/07    right     HX CHOLECYSTECTOMY  1994    HX HYSTERECTOMY  1973    HX OTHER SURGICAL  6/11/2012    AICD revision    HX PACEMAKER  4/28/2005    [employer] AICD home Medication List    Details   gabapentin (NEURONTIN) 600 mg tablet Take 600 mg by mouth two (2) times a day. cholecalciferol, VITAMIN D3, (VITAMIN D3) 5,000 unit tab tablet Take  by mouth daily. carvedilol (COREG) 6.25 mg tablet       SITagliptin (JANUVIA) 50 mg tablet Take 50 mg by mouth daily. ferrous sulfate 325 mg (65 mg iron) tablet Take 1 Tab by mouth two (2) times daily (with meals). Qty: 100 Tab, Refills: 0      furosemide (LASIX) 40 mg tablet 1 tab daily  Indications: Edema  Qty: 60 Tab, Refills: 0      insulin lispro (HUMALOG) 100 unit/mL injection See sliding scale  Qty: 1 Vial, Refills: 0      potassium chloride (K-DUR, KLOR-CON) 20 mEq tablet Take 1 Tab by mouth two (2) times a day. Qty: 30 Tab, Refills: 0      folic acid (FOLVITE) 1 mg tablet Take 1 Tab by mouth daily. Qty: 30 Tab, Refills: 0      insulin glargine (LANTUS) 100 unit/mL injection 5 Units by SubCUTAneous route nightly. Indications: type 2 diabetes mellitus  Qty: 1 Vial, Refills: 0    Associated Diagnoses: Type 2 diabetes mellitus with diabetic neuropathy, with long-term current use of insulin (HCC)      trimethoprim-sulfamethoxazole (BACTRIM DS, SEPTRA DS) 160-800 mg per tablet Take 1 Tab by mouth two (2) times a day. Qty: 10 Tab, Refills: 0      baclofen (LIORESAL) 10 mg tablet 10 mg daily. Lactobacillus Acidoph & Bulgar (FLORANEX) 1 million cell tab tablet Take 1 Tab by mouth two (2) times a day. Qty: 60 Tab, Refills: 0      gabapentin (NEURONTIN) 300 mg capsule Take 2 Caps by mouth two (2) times a day. Indications: NEUROPATHIC PAIN  Qty: 100 Cap, Refills: 0      famotidine (PEPCID) 20 mg tablet Take 1 Tab by mouth nightly. Qty: 30 Tab, Refills: 0      pravastatin (PRAVACHOL) 40 mg tablet Take 40 mg by mouth nightly.  Indications: DYSLIPIDEMIA             Current Facility-Administered Medications   Medication Dose Route Frequency    insulin glargine (LANTUS) injection 14 Units  14 Units SubCUTAneous QHS    potassium chloride (K-DUR, KLOR-CON) SR tablet 40 mEq  40 mEq Oral TID    lactated Ringers infusion  100 mL/hr IntraVENous CONTINUOUS    diphenhydrAMINE (BENADRYL) 12.5 mg/5 mL oral elixir 12.5 mg  12.5 mg Oral Q6H PRN    metroNIDAZOLE (FLAGYL) IVPB premix 500 mg  500 mg IntraVENous Q12H    carvedilol (COREG) tablet 6.25 mg  6.25 mg Oral BID WITH MEALS    baclofen (LIORESAL) tablet 10 mg  10 mg Oral DAILY    famotidine (PEPCID) tablet 20 mg  20 mg Oral QHS    ferrous sulfate tablet 325 mg  325 mg Oral BID WITH MEALS    folic acid (FOLVITE) tablet 1 mg  1 mg Oral DAILY    furosemide (LASIX) tablet 40 mg  40 mg Oral DAILY    gabapentin (NEURONTIN) capsule 600 mg  600 mg Oral BID    insulin lispro (HUMALOG) injection 5 Units  5 Units SubCUTAneous TIDAC    pravastatin (PRAVACHOL) tablet 40 mg  40 mg Oral QHS    ondansetron (ZOFRAN) injection 4 mg  4 mg IntraVENous Q6H PRN    0.9% sodium chloride infusion  75 mL/hr IntraVENous CONTINUOUS    heparin (porcine) injection 5,000 Units  5,000 Units SubCUTAneous Q8H    acetaminophen (TYLENOL) tablet 650 mg  650 mg Oral Q6H PRN    glucose chewable tablet 16 g  4 Tab Oral PRN    glucagon (GLUCAGEN) injection 1 mg  1 mg IntraMUSCular PRN    dextrose (D50W) injection syrg 12.5-25 g  25-50 mL IntraVENous PRN    insulin lispro (HUMALOG) injection   SubCUTAneous AC&HS       Allergies: Vancomycin; Ampicillin; Bactrim [sulfamethoxazole-trimethoprim]; Blueberry; Ciprofloxacin; Codeine; Crestor [rosuvastatin]; Darvocet a500 [propoxyphene n-acetaminophen]; Demerol [meperidine]; Levaquin [levofloxacin]; Lipitor [atorvastatin]; Magnesium oxide; Minocin [minocycline]; Pcn [penicillins]; Pravachol [pravastatin]; Shellfish derived; Sulfa (sulfonamide antibiotics); Ultracet [tramadol-acetaminophen]; Vicodin [hydrocodone-acetaminophen];  Vytorin 10-10 [ezetimibe-simvastatin]; and Percodan [oxycodone hcl-oxycodone-asa]    Family History   Problem Relation Age of Onset  Cancer Father      Leukemia     Social History     Social History    Marital status:      Spouse name: N/A    Number of children: N/A    Years of education: N/A     Occupational History    Not on file. Social History Main Topics    Smoking status: Never Smoker    Smokeless tobacco: Never Used    Alcohol use No    Drug use: No    Sexual activity: Yes     Partners: Male     Other Topics Concern    Not on file     Social History Narrative     History   Smoking Status    Never Smoker   Smokeless Tobacco    Never Used        Temp (24hrs), Av.8 °F (36.6 °C), Min:97.1 °F (36.2 °C), Max:98.5 °F (36.9 °C)    Visit Vitals    BP (!) 155/91 (BP 1 Location: Left arm, BP Patient Position: Supine)    Pulse 74    Temp 97.1 °F (36.2 °C)    Resp 16    Ht 5' 7\" (1.702 m)    Wt 118.3 kg (260 lb 14.4 oz)    SpO2 100%    BMI 40.86 kg/m2       ROS: 12 point ROS obtained in details. Pertinent positives as mentioned in HPI,   otherwise negative    Physical Exam:    General: Well developed, well nourished female laying on the bed, AAOx3 NAD      General:  awake alert and oriented   HEENT:  Normocephalic, atraumatic, PERRL, EOMI, no scleral icterus or pallor; no conjunctival hemmohage; nasal and oral mucous are moist and without evidence of lesions. Neck supple, no bruits. Lymph Nodes:  no cervical, axillary or inguinal adenopathy   Lungs:  non-labored, bilaterally clear to auscultation- no crackles wheezes rales or rhonchi   Heart:  s1 and s2 irregular; no rubs or gallops, no edema, + pedal pulses   Abdomen: soft, non-distended, active bowel sounds, no hepatomegaly, no splenomegaly.  no tenderness over the left abdominal pacemaker, no overlying erythema or fluctuance,no tenderness   Genitourinary: deferred   Extremities:  no clubbing, cyanosis; no joint effusions or swelling; muscle mass appropriate for age, wound vac right heel   Neurologic:  No gross focal sensory abnormalities; 5/5 muscle strength to upper extremities. 1/5 strength in lower extremities.  Cranial nerves intact   Skin:  Surgical scars abdomen, left neck well healed   Back: no paraspinal muscle guarding or rigidity, no spinal or paraspinal tenderness   Psychiatric:  No suicidal or homicidal ideations, appropriate mood and affect              Labs: Results:   Chemistry Recent Labs      08/21/18   0301  08/20/18   0356  08/19/18   0116   GLU  146*  120*  135*   NA  141  144  143   K  3.4*  4.0  3.9   CL  109*  110*  111*   CO2  26  27  25   BUN  18  19*  22*   CREA  1.20  1.18  1.14   CA  8.2*  8.6  8.7   AGAP  6  7  7   BUCR  15  16  19      CBC w/Diff Recent Labs      08/21/18   0301  08/20/18   0356  08/19/18   0116   WBC  5.1  5.1  4.6   RBC  3.80*  3.79*  3.65*   HGB  10.5*  10.5*  10.3*   HCT  31.3*  31.1*  29.9*   PLT  183  177  181   GRANS  65  64  57   LYMPH  23  22  29   EOS  3  4  4      Microbiology Recent Labs      08/20/18   1411   CULT  CULTURE IN PROGRESS,FURTHER UPDATES TO FOLLOW  NO GROWTH AFTER 21 HOURS          RADIOLOGY:    All available imaging studies/reports in [facility] care for this admission were reviewed    Dr. Mohsen Taylor, Infectious Disease Specialist  [phone]  August 21, 2018  1:02 PM

## 2018-08-21 NOTE — ANESTHESIA POSTPROCEDURE EVALUATION
Post-Anesthesia Evaluation and Assessment    Patient: Selam Carrera MRN: [id number]  SSN: [id number]    YOB: 1950  Age: 76 y.o. Sex: female       Cardiovascular Function/Vital Signs  Visit Vitals    /67    Pulse 72    Temp 36.8 °C (98.2 °F)    Resp 20    Ht 5' 7\" (1.702 m)    Wt 118.3 kg (260 lb 14.4 oz)    SpO2 95%    BMI 40.86 kg/m2       Patient is status post MAC anesthesia for Procedure(s):  DEBRIDEMENT RIGHT HEEL WITH REMOVAL INFECTED TISSUE AND BONE AND APPLICATION OF WOUND VAC. Nausea/Vomiting: None    Postoperative hydration reviewed and adequate. Pain:  Pain Scale 1: Numeric (0 - 10) (08/20/18 1929)  Pain Intensity 1: 0 (08/20/18 1929)   Managed    Neurological Status:   Neuro (WDL): Within Defined Limits (08/20/18 1429)  Neuro  Neurologic State: Alert (08/20/18 0027)  Orientation Level: Oriented X4 (08/20/18 0027)  Cognition: Follows commands (08/20/18 0027)  Speech: Clear (08/20/18 0027)  LLE Motor Response: Flaccid (08/20/18 0800)  RLE Motor Response: Flaccid (08/20/18 0800)   At baseline    Mental Status and Level of Consciousness: Arousable    Pulmonary Status:   O2 Device: Room air (08/20/18 1929)   Adequate oxygenation and airway patent    Complications related to anesthesia: None    Post-anesthesia assessment completed.  No concerns    Signed By: Pepper Gant MD     August 20, 2018

## 2018-08-21 NOTE — OP NOTES
[facility]  OPERATIVE REPORT    Name:Maciel MARS.  MR#: [id number]  : 1950  ACCOUNT #: [de-identified]   DATE OF SERVICE: 2018    PREOPERATIVE DIAGNOSIS:  Osteomyelitis and open ulcer, right heel. POSTOPERATIVE DIAGNOSIS:  Osteomyelitis and open ulcer, right heel. PROCEDURES PERFORMED:  Radical debridement of necrotic tissue and debridement of heel bone. Wound irrigation, application of wound VAC all right heel. SURGEON:  Ashley Alberto DPM    ASSISTANT:  -.     ANESTHESIA:  MAC.    ESTIMATED BLOOD LOSS:  -. SPECIMENS REMOVED:  -.     COMPLICATIONS:  -. IMPLANTS:  -. PROCEDURE:  On 2018, the patient was placed on the operating table in supine position. After adequate induction of MAC anesthesia, the right lower extremity was prepped and draped in the usual sterile fashion. Attention was directed to the right heel. There was devitalized fat tissue and Achilles tendon, which was excised sharply. Centrally calcaneus was exposed using osteotome and a mallet. A section was excised, sent for pathology and culture. Wound was thoroughly debrided, irrigated with antibiotic solution. A significant size wound was noted. Wound VAC was applied. Patient tolerated the procedure and anesthesia well with vital signs stable throughout. Good perfusion noted. Transferred to recovery room in stable condition.       MOOKIE Whitley / CAITLIN  D: 2018 14:26     T: 2018 09:40  JOB #: [id number]  CC: Leigh Elizondo DPM

## 2018-08-22 LAB
BACTERIA SPEC CULT: NORMAL
BACTERIA SPEC CULT: NORMAL
GLUCOSE BLD STRIP.AUTO-MCNC: 108 MG/DL (ref 70–110)
GLUCOSE BLD STRIP.AUTO-MCNC: 171 MG/DL (ref 70–110)
GLUCOSE BLD STRIP.AUTO-MCNC: 184 MG/DL (ref 70–110)
GLUCOSE BLD STRIP.AUTO-MCNC: 212 MG/DL (ref 70–110)
SERVICE CMNT-IMP: NORMAL
SERVICE CMNT-IMP: NORMAL

## 2018-08-22 PROCEDURE — 82962 GLUCOSE BLOOD TEST: CPT

## 2018-08-22 PROCEDURE — 74011250637 HC RX REV CODE- 250/637: Performed by: INTERNAL MEDICINE

## 2018-08-22 PROCEDURE — 74011636637 HC RX REV CODE- 636/637: Performed by: HOSPITALIST

## 2018-08-22 PROCEDURE — 74011000250 HC RX REV CODE- 250: Performed by: INTERNAL MEDICINE

## 2018-08-22 PROCEDURE — 74011250636 HC RX REV CODE- 250/636: Performed by: EMERGENCY MEDICINE

## 2018-08-22 PROCEDURE — 74011250636 HC RX REV CODE- 250/636: Performed by: HOSPITALIST

## 2018-08-22 PROCEDURE — 74011250637 HC RX REV CODE- 250/637: Performed by: HOSPITALIST

## 2018-08-22 PROCEDURE — 74011636637 HC RX REV CODE- 636/637: Performed by: INTERNAL MEDICINE

## 2018-08-22 PROCEDURE — 77030019934 HC DRSG VAC ASST KCON -B

## 2018-08-22 PROCEDURE — 65270000029 HC RM PRIVATE

## 2018-08-22 PROCEDURE — 74011250636 HC RX REV CODE- 250/636: Performed by: INTERNAL MEDICINE

## 2018-08-22 RX ADMIN — METRONIDAZOLE 500 MG: 500 INJECTION, SOLUTION INTRAVENOUS at 22:32

## 2018-08-22 RX ADMIN — INSULIN LISPRO 3 UNITS: 100 INJECTION, SOLUTION INTRAVENOUS; SUBCUTANEOUS at 11:59

## 2018-08-22 RX ADMIN — FOLIC ACID 1 MG: 1 TABLET ORAL at 08:05

## 2018-08-22 RX ADMIN — GABAPENTIN 600 MG: 300 CAPSULE ORAL at 17:01

## 2018-08-22 RX ADMIN — INSULIN LISPRO 6 UNITS: 100 INJECTION, SOLUTION INTRAVENOUS; SUBCUTANEOUS at 16:57

## 2018-08-22 RX ADMIN — HEPARIN SODIUM 5000 UNITS: 5000 INJECTION, SOLUTION INTRAVENOUS; SUBCUTANEOUS at 11:59

## 2018-08-22 RX ADMIN — WATER 2 G: 1 INJECTION INTRAMUSCULAR; INTRAVENOUS; SUBCUTANEOUS at 16:57

## 2018-08-22 RX ADMIN — SODIUM CHLORIDE 75 ML/HR: 900 INJECTION, SOLUTION INTRAVENOUS at 08:15

## 2018-08-22 RX ADMIN — INSULIN LISPRO 5 UNITS: 100 INJECTION, SOLUTION INTRAVENOUS; SUBCUTANEOUS at 11:58

## 2018-08-22 RX ADMIN — HEPARIN SODIUM 5000 UNITS: 5000 INJECTION, SOLUTION INTRAVENOUS; SUBCUTANEOUS at 01:04

## 2018-08-22 RX ADMIN — FUROSEMIDE 40 MG: 40 TABLET ORAL at 08:05

## 2018-08-22 RX ADMIN — FERROUS SULFATE TAB 325 MG (65 MG ELEMENTAL FE) 325 MG: 325 (65 FE) TAB at 08:05

## 2018-08-22 RX ADMIN — DIPHENHYDRAMINE HYDROCHLORIDE 12.5 MG: 25 SOLUTION ORAL at 12:10

## 2018-08-22 RX ADMIN — SODIUM CHLORIDE 75 ML/HR: 900 INJECTION, SOLUTION INTRAVENOUS at 21:07

## 2018-08-22 RX ADMIN — PRAVASTATIN SODIUM 40 MG: 20 TABLET ORAL at 21:02

## 2018-08-22 RX ADMIN — FERROUS SULFATE TAB 325 MG (65 MG ELEMENTAL FE) 325 MG: 325 (65 FE) TAB at 16:57

## 2018-08-22 RX ADMIN — GABAPENTIN 600 MG: 300 CAPSULE ORAL at 08:05

## 2018-08-22 RX ADMIN — POTASSIUM CHLORIDE 40 MEQ: 20 TABLET, EXTENDED RELEASE ORAL at 08:05

## 2018-08-22 RX ADMIN — HEPARIN SODIUM 5000 UNITS: 5000 INJECTION, SOLUTION INTRAVENOUS; SUBCUTANEOUS at 17:01

## 2018-08-22 RX ADMIN — ACETAMINOPHEN 650 MG: 325 TABLET, FILM COATED ORAL at 15:36

## 2018-08-22 RX ADMIN — INSULIN LISPRO 5 UNITS: 100 INJECTION, SOLUTION INTRAVENOUS; SUBCUTANEOUS at 08:06

## 2018-08-22 RX ADMIN — INSULIN LISPRO 5 UNITS: 100 INJECTION, SOLUTION INTRAVENOUS; SUBCUTANEOUS at 16:56

## 2018-08-22 RX ADMIN — INSULIN GLARGINE 14 UNITS: 100 INJECTION, SOLUTION SUBCUTANEOUS at 21:03

## 2018-08-22 RX ADMIN — BACLOFEN 10 MG: 10 TABLET ORAL at 08:05

## 2018-08-22 RX ADMIN — INSULIN LISPRO 3 UNITS: 100 INJECTION, SOLUTION INTRAVENOUS; SUBCUTANEOUS at 08:08

## 2018-08-22 RX ADMIN — METRONIDAZOLE 500 MG: 500 INJECTION, SOLUTION INTRAVENOUS at 12:00

## 2018-08-22 RX ADMIN — CARVEDILOL 6.25 MG: 6.25 TABLET, FILM COATED ORAL at 16:58

## 2018-08-22 RX ADMIN — CARVEDILOL 6.25 MG: 6.25 TABLET, FILM COATED ORAL at 08:05

## 2018-08-22 NOTE — ROUTINE PROCESS
IV site infiltrated . New IV #20 catheter  inserted in the right fore arm ,patient tolerated procedure.

## 2018-08-22 NOTE — ROUTINE PROCESS
Patient in bed AAOx4 watching TV, no c/o pain at this time. Wound vac to the right heel, intact draining scant amount of serosanguinous liquids.  No acute distress noted

## 2018-08-22 NOTE — PROGRESS NOTES
Problem: Falls - Risk of  Goal: *Absence of Falls  Document Brad Fall Risk and appropriate interventions in the flowsheet. Outcome: Progressing Towards Goal  Fall Risk Interventions:  Mobility Interventions: Bed/chair exit alarm         Medication Interventions: Evaluate medications/consider consulting pharmacy    Elimination Interventions: Call light in reach             Problem: Pressure Injury - Risk of  Goal: *Prevention of pressure injury  Document Surjit Scale and appropriate interventions in the flowsheet. Outcome: Progressing Towards Goal  Pressure Injury Interventions:  Sensory Interventions: Assess changes in LOC, Assess need for specialty bed, Avoid rigorous massage over bony prominences, Chair cushion, Check visual cues for pain, Discuss PT/OT consult with provider, Float heels, Keep linens dry and wrinkle-free, Maintain/enhance activity level, Minimize linen layers, Pressure redistribution bed/mattress (bed type), Sit a 90-degree angle/use footstool if needed, Turn and reposition approx. every two hours (pillows and wedges if needed), Use 30-degree side-lying position    Moisture Interventions: Absorbent underpads, Apply protective barrier, creams and emollients, Assess need for specialty bed, Check for incontinence Q2 hours and as needed, Contain wound drainage, Limit adult briefs, Maintain skin hydration (lotion/cream), Minimize layers, Moisture barrier, Offer toileting Q_hr    Activity Interventions: Assess need for specialty bed, Increase time out of bed, Pressure redistribution bed/mattress(bed type)    Mobility Interventions: Assess need for specialty bed, Float heels, HOB 30 degrees or less, Pressure redistribution bed/mattress (bed type), PT/OT evaluation, Turn and reposition approx.  every two hours(pillow and wedges)    Nutrition Interventions: Document food/fluid/supplement intake, Discuss nutritional consult with provider, Offer support with meals,snacks and hydration    Friction and Shear Interventions: Feet elevated on foot rest, Foam dressings/transparent film/skin sealants, Lift sheet, Lift team/patient mobility team, Minimize layers, Transferring/repositioning devices

## 2018-08-22 NOTE — DIABETES MGMT
Glycemic Control Plan of Care    T2DM with current A1c of 10.2% (8/17/2018). See separate notes, 8/17/2018, for assessment of home diabetes management and eduction. Home diabetes meds: Lantus insulin 18 units daily and humalog insulin 5 units TID AC. POC BG range on 8/21/2018: 150-224 mg/dL. POC BG report on 8/21/2018 at time of review: 171 mg/dL. Seen patient this morning. Discussed and encouraged to continue to follow diabetes treatment plan. Reinforced the importance of serving size/portion control and reading food label: 1 carb:15 gm of carbs including bananas. Recommendation(s):  1.) Continue on current insulin orders: basal, prandial and correctional as ordered. 2.) Modified diabetic diet by adding no concentrated sweets. Assessment:  Patient is 76year old with past medical history including type 2 diabetes mellitus with neuropathy, dyslipidemia, HALI on Cpap, AICD, CHF, obesity, non ischemic cardiomyopathy, paraplegia from waist down, CVA, and hypertension - was admitted on 8/16/2018 with report of non healing wound right heel. Noted:  Ulcer and infection right heel. Status post debridement right heel with removal infected tissue and bone and application of wound vac on 8/20/2018. Chronic UTI on indwelling urinary catheter. T2DM with current A1c of 10.2% (8/17/2018). Most recent blood glucose values:    Results for Yfn Garcia (MRN [id number]) as of 8/22/2018 10:32   Ref. Range 8/21/2018 06:14 8/21/2018 11:44 8/21/2018 16:01 8/21/2018 22:01   GLUCOSE,FAST - POC Latest Ref Range: 70 - 110 mg/dL 176 (H) 201 (H) 150 (H) 264 (H)     Results for Yfn Garcia (MRN [id number]) as of 8/22/2018 10:32   Ref. Range 8/22/2018 05:40   GLUCOSE,FAST - POC Latest Ref Range: 70 - 110 mg/dL 171 (H)     Current A1C: 10.2% (8/17/2018) which is equivalent to estimated average blood glucose of 246 mg/dL during the past 2-3 months.     Current hospital diabetes medications:   Basal lantus insulin 14 units daily at bedtime. Prandial lispro insulin 5 units TID AC. Correctional lispro insulin ACHS. Very resistant dose. Total daily dose insulin requirement previous day: 8/21/2018  Lantus: 14 units  Lispro: 35 units (prandial and correctional)  TDD: 49 units of insulin    Home diabetes medications: Patient reported on 8/22/2018:  Lantus insulin 18 units daily. Humalog insulin 5 units TID AC. Diet: Diabetic consistent carb regular; no concentrated sweets.     Goals:  Blood glucose will be within target range of  mg/dL by 8/25/2018    Education:  _X__  Refer to Diabetes Education Record: 8/17/2018             ___  Education not indicated at this time    Thomas Millan RN [facility]  Pager: [phone]

## 2018-08-22 NOTE — PROGRESS NOTES
Infectious Disease Progress Note    Requested by: Dr. Dennison First    Reason: right heel infection    Current abx Prior abx   Metronidazole since 8/16  Ceftriaxone since 8/21 Aztreonam, vancomycin 8/16-8/21     Lines:       Assessment :    76 y.o. female who has a h/o  uncontrolled DM2 (last hgbA1C 10.2 on 8/17/18), HTN, CHF, CAD and paraplegia who presented from her Podiatrist office on 8/16/18 due to worsening wound on her right heel.          hospitalization (4/20/17-5/15/17) for Sepsis  secondary to group B streptococcus bloodstream infection (positive blood cultures 4/20, negative blood cultures 4/21). Most likely source of bloodstream infection is infected right psoas hematoma/abscess. 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        bilateral LE dvt - s/p IVC filter placement 5/11     Clinical presentation c/w infected right heel ulcer, acute osteomyelitis right calcaneum. S/p Radical debridement of necrotic tissue and debridement of heel bone. Wound irrigation, application of wound VAC on 8/20/18. Intra op cultures negative. CRP 2.4 on 8/21/18    Recommendations:      1. Continue metronidazole, ceftriaxone   2. F/u intra op cultures  3. Will make final abx decisions based on intra op cultures, clinical course       Advance Care planning: full code: discussed  with patient/surrogate decision maker:Tomi Gonzalez: [phone]   Above plan was discussed in details with patient, family and dr Alicia Thacker. Please call me if any further questions or concerns. Will continue to participate in the care of this patient. subjective:    Patient denies headaches, visual disturbances, sore throat, runny nose, earaches, cp, sob, cough, abdominal pain, diarrhea, burning micturition, or weakness in extremities. she denies back pain/flank pain.       home Medication List    Details   gabapentin (NEURONTIN) 600 mg tablet Take 600 mg by mouth two (2) times a day. cholecalciferol, VITAMIN D3, (VITAMIN D3) 5,000 unit tab tablet Take  by mouth daily. carvedilol (COREG) 6.25 mg tablet       SITagliptin (JANUVIA) 50 mg tablet Take 50 mg by mouth daily. ferrous sulfate 325 mg (65 mg iron) tablet Take 1 Tab by mouth two (2) times daily (with meals). Qty: 100 Tab, Refills: 0      furosemide (LASIX) 40 mg tablet 1 tab daily  Indications: Edema  Qty: 60 Tab, Refills: 0      insulin lispro (HUMALOG) 100 unit/mL injection See sliding scale  Qty: 1 Vial, Refills: 0      potassium chloride (K-DUR, KLOR-CON) 20 mEq tablet Take 1 Tab by mouth two (2) times a day. Qty: 30 Tab, Refills: 0      folic acid (FOLVITE) 1 mg tablet Take 1 Tab by mouth daily. Qty: 30 Tab, Refills: 0      insulin glargine (LANTUS) 100 unit/mL injection 5 Units by SubCUTAneous route nightly. Indications: type 2 diabetes mellitus  Qty: 1 Vial, Refills: 0    Associated Diagnoses: Type 2 diabetes mellitus with diabetic neuropathy, with long-term current use of insulin (HCC)      trimethoprim-sulfamethoxazole (BACTRIM DS, SEPTRA DS) 160-800 mg per tablet Take 1 Tab by mouth two (2) times a day. Qty: 10 Tab, Refills: 0      baclofen (LIORESAL) 10 mg tablet 10 mg daily. Lactobacillus Acidoph & Bulgar (FLORANEX) 1 million cell tab tablet Take 1 Tab by mouth two (2) times a day. Qty: 60 Tab, Refills: 0      gabapentin (NEURONTIN) 300 mg capsule Take 2 Caps by mouth two (2) times a day. Indications: NEUROPATHIC PAIN  Qty: 100 Cap, Refills: 0      famotidine (PEPCID) 20 mg tablet Take 1 Tab by mouth nightly. Qty: 30 Tab, Refills: 0      pravastatin (PRAVACHOL) 40 mg tablet Take 40 mg by mouth nightly.  Indications: DYSLIPIDEMIA             Current Facility-Administered Medications   Medication Dose Route Frequency    insulin glargine (LANTUS) injection 14 Units  14 Units SubCUTAneous QHS    cefTRIAXone (ROCEPHIN) 2 g in sterile water (preservative free) 20 mL IV syringe  2 g IntraVENous Q24H    loperamide (IMODIUM) capsule 2 mg  2 mg Oral Q12H PRN    diphenhydrAMINE (BENADRYL) 12.5 mg/5 mL oral elixir 12.5 mg  12.5 mg Oral Q6H PRN    metroNIDAZOLE (FLAGYL) IVPB premix 500 mg  500 mg IntraVENous Q12H    carvedilol (COREG) tablet 6.25 mg  6.25 mg Oral BID WITH MEALS    baclofen (LIORESAL) tablet 10 mg  10 mg Oral DAILY    famotidine (PEPCID) tablet 20 mg  20 mg Oral QHS    ferrous sulfate tablet 325 mg  325 mg Oral BID WITH MEALS    folic acid (FOLVITE) tablet 1 mg  1 mg Oral DAILY    furosemide (LASIX) tablet 40 mg  40 mg Oral DAILY    gabapentin (NEURONTIN) capsule 600 mg  600 mg Oral BID    insulin lispro (HUMALOG) injection 5 Units  5 Units SubCUTAneous TIDAC    pravastatin (PRAVACHOL) tablet 40 mg  40 mg Oral QHS    ondansetron (ZOFRAN) injection 4 mg  4 mg IntraVENous Q6H PRN    0.9% sodium chloride infusion  75 mL/hr IntraVENous CONTINUOUS    heparin (porcine) injection 5,000 Units  5,000 Units SubCUTAneous Q8H    acetaminophen (TYLENOL) tablet 650 mg  650 mg Oral Q6H PRN    glucose chewable tablet 16 g  4 Tab Oral PRN    glucagon (GLUCAGEN) injection 1 mg  1 mg IntraMUSCular PRN    dextrose (D50W) injection syrg 12.5-25 g  25-50 mL IntraVENous PRN    insulin lispro (HUMALOG) injection   SubCUTAneous AC&HS       Allergies: Vancomycin; Ampicillin; Bactrim [sulfamethoxazole-trimethoprim]; Blueberry; Ciprofloxacin; Codeine; Crestor [rosuvastatin]; Darvocet a500 [propoxyphene n-acetaminophen]; Demerol [meperidine]; Levaquin [levofloxacin]; Lipitor [atorvastatin]; Magnesium oxide; Minocin [minocycline]; Pcn [penicillins]; Pravachol [pravastatin]; Shellfish derived; Sulfa (sulfonamide antibiotics); Ultracet [tramadol-acetaminophen]; Vicodin [hydrocodone-acetaminophen];  Vytorin 10-10 [ezetimibe-simvastatin]; and Percodan [oxycodone hcl-oxycodone-asa]    Temp (24hrs), Av °F (36.7 °C), Min:97 °F (36.1 °C), Max:99.1 °F (37.3 °C)    Visit Vitals    /65 (BP 1 Location: Left arm, BP Patient Position: At rest)    Pulse 63    Temp 97.1 °F (36.2 °C)    Resp 18    Ht 5' 7\" (1.702 m)    Wt 116.1 kg (256 lb)    SpO2 100%    BMI 40.1 kg/m2       ROS: 12 point ROS obtained in details. Pertinent positives as mentioned in HPI,   otherwise negative    Physical Exam:    General: Well developed, well nourished female laying on the bed, AAOx3 NAD      General:  awake alert and oriented   HEENT:  Normocephalic, atraumatic, PERRL, EOMI, no scleral icterus or pallor; no conjunctival hemmohage; nasal and oral mucous are moist and without evidence of lesions. Neck supple, no bruits. Lymph Nodes:  no cervical, axillary or inguinal adenopathy   Lungs:  non-labored, bilaterally clear to auscultation- no crackles wheezes rales or rhonchi   Heart:  s1 and s2 irregular; no rubs or gallops, no edema, + pedal pulses   Abdomen: soft, non-distended, active bowel sounds, no hepatomegaly, no splenomegaly. no tenderness over the left abdominal pacemaker, no overlying erythema or fluctuance,no tenderness   Genitourinary: deferred   Extremities:  no clubbing, cyanosis; no joint effusions or swelling; muscle mass appropriate for age, wound vac right heel   Neurologic:  No gross focal sensory abnormalities; 5/5 muscle strength to upper extremities. 1/5 strength in lower extremities.  Cranial nerves intact   Skin:  Surgical scars abdomen, left neck well healed   Back: no paraspinal muscle guarding or rigidity, no spinal or paraspinal tenderness   Psychiatric:  No suicidal or homicidal ideations, appropriate mood and affect              Labs: Results:   Chemistry Recent Labs      08/21/18   0301  08/20/18   0356   GLU  146*  120*   NA  141  144   K  3.4*  4.0   CL  109*  110*   CO2  26  27   BUN  18  19*   CREA  1.20  1.18   CA  8.2*  8.6   AGAP  6  7   BUCR  15  16      CBC w/Diff Recent Labs      08/21/18   0301  08/20/18   0356   WBC  5.1  5.1   RBC  3.80*  3.79*   HGB 10.5*  10.5*   HCT  31.3*  31.1*   PLT  183  177   GRANS  65  64   LYMPH  23  22   EOS  3  4      Microbiology Recent Labs      08/20/18   1411   CULT  CULTURE IN PROGRESS,FURTHER UPDATES TO FOLLOW  NO ANAEROBES ISOLATED 2 DAYS          RADIOLOGY:    All available imaging studies/reports in [facility] for this admission were reviewed    Dr. Parminder Neal, Infectious Disease Specialist  [phone]  August 22, 2018  1:02 PM

## 2018-08-22 NOTE — PROGRESS NOTES
[facility] Hospitalist Group  Progress Note    Patient: Hannah Hernandez Age: 76 y.o. : 1950 MR#: [id number] SSN: [id number]  Date/Time: 2018     Subjective:     Review of systems  S/p - foot surgery   Leg edema = bilaterally     Assessment/Plan:   1. Osteomyelitis right foot   2 HTN  3 DM2- Uncontrolled high blood glucose - HbA1c - > 10   4 HLD   5 H/o Iliopsoas abscess with paraplegia   6 Chronic Urinary retention on Indwelling cath   7 H/o PPM h/o Afib - not on anticoagulation - due to severe bleed/hematoma   8 H/o CVA   9 chronic systolic Heart failure ( overview - Her very last echocardiogram in 2015 showed that her ejection fraction was still very mildly decreased at 45-50%.  She did have a biventricular AICD placed, but due to trauma and infection in that area that had to be removed and now she has a biventricular pacemaker with a pulse generator residing in her left upper quadrant, placed back in 2012. )        PLAN   - Adjust dose lasix - home dose 40 Am and 20 PM - per patient suggested by her cardiology -   - Wound cultures pending - id consult appreciated   - Continue local wound care         Case discussed with:  [x]Patient  [x]Family  []Nursing  []Case Management  DVT Prophylaxis:  []Lovenox  []Hep SQ  []SCDs  []Coumadin   []On Heparin gtt          Objective:   VS:   Visit Vitals    /74 (BP 1 Location: Left arm, BP Patient Position: At rest)    Pulse 76    Temp 97 °F (36.1 °C)    Resp 18    Ht 5' 7\" (1.702 m)    Wt 118.3 kg (260 lb 14.4 oz)    SpO2 98%    BMI 40.86 kg/m2      Tmax/24hrs: Temp (24hrs), Av °F (36.7 °C), Min:97 °F (36.1 °C), Max:99.1 °F (37.3 °C)  IOBRIEF    Intake/Output Summary (Last 24 hours) at 18 0933  Last data filed at 18 3383   Gross per 24 hour   Intake             1260 ml   Output             2925 ml   Net            -1665 ml       General:  Alert, cooperative, no acute distress   Cardiovascular: S1S2 - regular , No Murmur   Pulmonary: Equal expansion , No Use of accessory muscles , No Rales No Rhonchi    GI:  +BS in all four quadrants, soft, non-tender  Extremities:  1 + bilateral edema; 2+ dorsalis pedis pulses bilaterally  Neuro: Alert and oriented X 2.        Medications:   Current Facility-Administered Medications   Medication Dose Route Frequency    insulin glargine (LANTUS) injection 14 Units  14 Units SubCUTAneous QHS    cefTRIAXone (ROCEPHIN) 2 g in sterile water (preservative free) 20 mL IV syringe  2 g IntraVENous Q24H    loperamide (IMODIUM) capsule 2 mg  2 mg Oral Q12H PRN    diphenhydrAMINE (BENADRYL) 12.5 mg/5 mL oral elixir 12.5 mg  12.5 mg Oral Q6H PRN    metroNIDAZOLE (FLAGYL) IVPB premix 500 mg  500 mg IntraVENous Q12H    carvedilol (COREG) tablet 6.25 mg  6.25 mg Oral BID WITH MEALS    baclofen (LIORESAL) tablet 10 mg  10 mg Oral DAILY    famotidine (PEPCID) tablet 20 mg  20 mg Oral QHS    ferrous sulfate tablet 325 mg  325 mg Oral BID WITH MEALS    folic acid (FOLVITE) tablet 1 mg  1 mg Oral DAILY    furosemide (LASIX) tablet 40 mg  40 mg Oral DAILY    gabapentin (NEURONTIN) capsule 600 mg  600 mg Oral BID    insulin lispro (HUMALOG) injection 5 Units  5 Units SubCUTAneous TIDAC    pravastatin (PRAVACHOL) tablet 40 mg  40 mg Oral QHS    ondansetron (ZOFRAN) injection 4 mg  4 mg IntraVENous Q6H PRN    0.9% sodium chloride infusion  75 mL/hr IntraVENous CONTINUOUS    heparin (porcine) injection 5,000 Units  5,000 Units SubCUTAneous Q8H    acetaminophen (TYLENOL) tablet 650 mg  650 mg Oral Q6H PRN    glucose chewable tablet 16 g  4 Tab Oral PRN    glucagon (GLUCAGEN) injection 1 mg  1 mg IntraMUSCular PRN    dextrose (D50W) injection syrg 12.5-25 g  25-50 mL IntraVENous PRN    insulin lispro (HUMALOG) injection   SubCUTAneous AC&HS       Labs:    Recent Labs      08/21/18   0301  08/20/18   0356   WBC  5.1  5.1   HGB  10.5*  10.5*   HCT  31.3*  31.1*   PLT  183  177 Recent Labs      08/21/18   0301  08/20/18   0356   NA  141  144   K  3.4*  4.0   CL  109*  110*   CO2  26  27   GLU  146*  120*   BUN  18  19*   CREA  1.20  1.18   CA  8.2*  8.6         Signed By: Mary Lou Baxter MD     August 22, 2018

## 2018-08-22 NOTE — PROGRESS NOTES
Bedside and Verbal shift change report given to Uzair Rn (oncoming nurse) by Wu Friday RN (offgoing nurse). Report included the following information SBAR, Kardex, Intake/Output, MAR and Recent Results.

## 2018-08-23 LAB
GLUCOSE BLD STRIP.AUTO-MCNC: 115 MG/DL (ref 70–110)
GLUCOSE BLD STRIP.AUTO-MCNC: 121 MG/DL (ref 70–110)
GLUCOSE BLD STRIP.AUTO-MCNC: 186 MG/DL (ref 70–110)
GLUCOSE BLD STRIP.AUTO-MCNC: 199 MG/DL (ref 70–110)

## 2018-08-23 PROCEDURE — 74011250637 HC RX REV CODE- 250/637: Performed by: INTERNAL MEDICINE

## 2018-08-23 PROCEDURE — 74011636637 HC RX REV CODE- 636/637: Performed by: HOSPITALIST

## 2018-08-23 PROCEDURE — 94660 CPAP INITIATION&MGMT: CPT

## 2018-08-23 PROCEDURE — 74011250636 HC RX REV CODE- 250/636: Performed by: HOSPITALIST

## 2018-08-23 PROCEDURE — 74011250636 HC RX REV CODE- 250/636: Performed by: EMERGENCY MEDICINE

## 2018-08-23 PROCEDURE — 74011250637 HC RX REV CODE- 250/637: Performed by: HOSPITALIST

## 2018-08-23 PROCEDURE — 65270000029 HC RM PRIVATE

## 2018-08-23 PROCEDURE — 82962 GLUCOSE BLOOD TEST: CPT

## 2018-08-23 PROCEDURE — 74011636637 HC RX REV CODE- 636/637: Performed by: INTERNAL MEDICINE

## 2018-08-23 RX ORDER — CEFPODOXIME PROXETIL 200 MG/1
400 TABLET, FILM COATED ORAL EVERY 12 HOURS
Status: DISCONTINUED | OUTPATIENT
Start: 2018-08-23 | End: 2018-08-24 | Stop reason: HOSPADM

## 2018-08-23 RX ADMIN — INSULIN LISPRO 3 UNITS: 100 INJECTION, SOLUTION INTRAVENOUS; SUBCUTANEOUS at 22:06

## 2018-08-23 RX ADMIN — FOLIC ACID 1 MG: 1 TABLET ORAL at 09:49

## 2018-08-23 RX ADMIN — INSULIN LISPRO 5 UNITS: 100 INJECTION, SOLUTION INTRAVENOUS; SUBCUTANEOUS at 08:05

## 2018-08-23 RX ADMIN — PRAVASTATIN SODIUM 40 MG: 20 TABLET ORAL at 22:06

## 2018-08-23 RX ADMIN — METRONIDAZOLE 500 MG: 500 INJECTION, SOLUTION INTRAVENOUS at 10:41

## 2018-08-23 RX ADMIN — FERROUS SULFATE TAB 325 MG (65 MG ELEMENTAL FE) 325 MG: 325 (65 FE) TAB at 09:49

## 2018-08-23 RX ADMIN — ACETAMINOPHEN 650 MG: 325 TABLET, FILM COATED ORAL at 01:35

## 2018-08-23 RX ADMIN — FAMOTIDINE 20 MG: 20 TABLET ORAL at 22:06

## 2018-08-23 RX ADMIN — CEFPODOXIME PROXETIL 400 MG: 200 TABLET, FILM COATED ORAL at 22:06

## 2018-08-23 RX ADMIN — INSULIN LISPRO 5 UNITS: 100 INJECTION, SOLUTION INTRAVENOUS; SUBCUTANEOUS at 17:07

## 2018-08-23 RX ADMIN — HEPARIN SODIUM 5000 UNITS: 5000 INJECTION, SOLUTION INTRAVENOUS; SUBCUTANEOUS at 01:33

## 2018-08-23 RX ADMIN — HEPARIN SODIUM 5000 UNITS: 5000 INJECTION, SOLUTION INTRAVENOUS; SUBCUTANEOUS at 17:10

## 2018-08-23 RX ADMIN — INSULIN GLARGINE 14 UNITS: 100 INJECTION, SOLUTION SUBCUTANEOUS at 22:06

## 2018-08-23 RX ADMIN — SODIUM CHLORIDE 75 ML/HR: 900 INJECTION, SOLUTION INTRAVENOUS at 12:36

## 2018-08-23 RX ADMIN — FUROSEMIDE 40 MG: 40 TABLET ORAL at 09:49

## 2018-08-23 RX ADMIN — INSULIN LISPRO 3 UNITS: 100 INJECTION, SOLUTION INTRAVENOUS; SUBCUTANEOUS at 11:38

## 2018-08-23 RX ADMIN — BACLOFEN 10 MG: 10 TABLET ORAL at 09:49

## 2018-08-23 RX ADMIN — ACETAMINOPHEN 650 MG: 325 TABLET, FILM COATED ORAL at 22:06

## 2018-08-23 RX ADMIN — GABAPENTIN 600 MG: 300 CAPSULE ORAL at 09:49

## 2018-08-23 RX ADMIN — INSULIN LISPRO 5 UNITS: 100 INJECTION, SOLUTION INTRAVENOUS; SUBCUTANEOUS at 11:40

## 2018-08-23 RX ADMIN — CARVEDILOL 6.25 MG: 6.25 TABLET, FILM COATED ORAL at 17:07

## 2018-08-23 RX ADMIN — DIPHENHYDRAMINE HYDROCHLORIDE 12.5 MG: 25 SOLUTION ORAL at 10:43

## 2018-08-23 RX ADMIN — CARVEDILOL 6.25 MG: 6.25 TABLET, FILM COATED ORAL at 09:49

## 2018-08-23 RX ADMIN — GABAPENTIN 600 MG: 300 CAPSULE ORAL at 17:07

## 2018-08-23 RX ADMIN — HEPARIN SODIUM 5000 UNITS: 5000 INJECTION, SOLUTION INTRAVENOUS; SUBCUTANEOUS at 09:50

## 2018-08-23 RX ADMIN — FERROUS SULFATE TAB 325 MG (65 MG ELEMENTAL FE) 325 MG: 325 (65 FE) TAB at 17:08

## 2018-08-23 NOTE — PROGRESS NOTES
Infectious Disease Progress Note    Requested by: Dr. Desmond Metzger    Reason: right heel infection    Current abx Prior abx   Metronidazole since 8/16  Ceftriaxone since 8/21 Aztreonam, vancomycin 8/16-8/21     Lines:       Assessment :    76 y.o. female who has a h/o  uncontrolled DM2 (last hgbA1C 10.2 on 8/17/18), HTN, CHF, CAD and paraplegia who presented from her Podiatrist office on 8/16/18 due to worsening wound on her right heel.          hospitalization (4/20/17-5/15/17) for Sepsis  secondary to group B streptococcus bloodstream infection (positive blood cultures 4/20, negative blood cultures 4/21). Most likely source of bloodstream infection is infected right psoas hematoma/abscess. 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        bilateral LE dvt - s/p IVC filter placement 5/11     Clinical presentation c/w infected right heel ulcer, acute osteomyelitis right calcaneum. S/p Radical debridement of necrotic tissue and debridement of heel bone. Wound irrigation, application of wound VAC on 8/20/18. Intra op cultures negative. CRP 2.4 on 8/21/18    Recommendations:      1. discontinue metronidazole, ceftriaxone   2. Recommend to switch to po cefpodoxime till 9/20/18   3. Wound care right heel per podiatry.       [address] [place] [address] planning: full code: discussed  with patient/surrogate decision maker:Mike Gonzalezan: [phone]   Above plan was discussed in details with patient, family and dr Eben Kussmaul. Please call me if any further questions or concerns. Will continue to participate in the care of this patient. subjective:    Patient denies headaches, visual disturbances, sore throat, runny nose, earaches, cp, sob, cough, abdominal pain, diarrhea, burning micturition, or weakness in extremities. she denies back pain/flank pain.       home Medication List    Details   gabapentin (NEURONTIN) 600 mg tablet Take 600 mg by mouth two (2) times a day. cholecalciferol, VITAMIN D3, (VITAMIN D3) 5,000 unit tab tablet Take  by mouth daily. carvedilol (COREG) 6.25 mg tablet       SITagliptin (JANUVIA) 50 mg tablet Take 50 mg by mouth daily. ferrous sulfate 325 mg (65 mg iron) tablet Take 1 Tab by mouth two (2) times daily (with meals). Qty: 100 Tab, Refills: 0      furosemide (LASIX) 40 mg tablet 1 tab daily  Indications: Edema  Qty: 60 Tab, Refills: 0      insulin lispro (HUMALOG) 100 unit/mL injection See sliding scale  Qty: 1 Vial, Refills: 0      potassium chloride (K-DUR, KLOR-CON) 20 mEq tablet Take 1 Tab by mouth two (2) times a day. Qty: 30 Tab, Refills: 0      folic acid (FOLVITE) 1 mg tablet Take 1 Tab by mouth daily. Qty: 30 Tab, Refills: 0      insulin glargine (LANTUS) 100 unit/mL injection 5 Units by SubCUTAneous route nightly. Indications: type 2 diabetes mellitus  Qty: 1 Vial, Refills: 0    Associated Diagnoses: Type 2 diabetes mellitus with diabetic neuropathy, with long-term current use of insulin (HCC)      trimethoprim-sulfamethoxazole (BACTRIM DS, SEPTRA DS) 160-800 mg per tablet Take 1 Tab by mouth two (2) times a day. Qty: 10 Tab, Refills: 0      baclofen (LIORESAL) 10 mg tablet 10 mg daily. Lactobacillus Acidoph & Bulgar (FLORANEX) 1 million cell tab tablet Take 1 Tab by mouth two (2) times a day. Qty: 60 Tab, Refills: 0      gabapentin (NEURONTIN) 300 mg capsule Take 2 Caps by mouth two (2) times a day. Indications: NEUROPATHIC PAIN  Qty: 100 Cap, Refills: 0      famotidine (PEPCID) 20 mg tablet Take 1 Tab by mouth nightly. Qty: 30 Tab, Refills: 0      pravastatin (PRAVACHOL) 40 mg tablet Take 40 mg by mouth nightly.  Indications: DYSLIPIDEMIA             Current Facility-Administered Medications   Medication Dose Route Frequency    insulin glargine (LANTUS) injection 14 Units  14 Units SubCUTAneous QHS    cefTRIAXone (ROCEPHIN) 2 g in sterile water (preservative free) 20 mL IV syringe  2 g IntraVENous Q24H    loperamide (IMODIUM) capsule 2 mg  2 mg Oral Q12H PRN    diphenhydrAMINE (BENADRYL) 12.5 mg/5 mL oral elixir 12.5 mg  12.5 mg Oral Q6H PRN    metroNIDAZOLE (FLAGYL) IVPB premix 500 mg  500 mg IntraVENous Q12H    carvedilol (COREG) tablet 6.25 mg  6.25 mg Oral BID WITH MEALS    baclofen (LIORESAL) tablet 10 mg  10 mg Oral DAILY    famotidine (PEPCID) tablet 20 mg  20 mg Oral QHS    ferrous sulfate tablet 325 mg  325 mg Oral BID WITH MEALS    folic acid (FOLVITE) tablet 1 mg  1 mg Oral DAILY    furosemide (LASIX) tablet 40 mg  40 mg Oral DAILY    gabapentin (NEURONTIN) capsule 600 mg  600 mg Oral BID    insulin lispro (HUMALOG) injection 5 Units  5 Units SubCUTAneous TIDAC    pravastatin (PRAVACHOL) tablet 40 mg  40 mg Oral QHS    ondansetron (ZOFRAN) injection 4 mg  4 mg IntraVENous Q6H PRN    0.9% sodium chloride infusion  75 mL/hr IntraVENous CONTINUOUS    heparin (porcine) injection 5,000 Units  5,000 Units SubCUTAneous Q8H    acetaminophen (TYLENOL) tablet 650 mg  650 mg Oral Q6H PRN    glucose chewable tablet 16 g  4 Tab Oral PRN    glucagon (GLUCAGEN) injection 1 mg  1 mg IntraMUSCular PRN    dextrose (D50W) injection syrg 12.5-25 g  25-50 mL IntraVENous PRN    insulin lispro (HUMALOG) injection   SubCUTAneous AC&HS       Allergies: Vancomycin; Ampicillin; Bactrim [sulfamethoxazole-trimethoprim]; Blueberry; Ciprofloxacin; Codeine; Crestor [rosuvastatin]; Darvocet a500 [propoxyphene n-acetaminophen]; Demerol [meperidine]; Levaquin [levofloxacin]; Lipitor [atorvastatin]; Magnesium oxide; Minocin [minocycline]; Pcn [penicillins]; Pravachol [pravastatin]; Shellfish derived; Sulfa (sulfonamide antibiotics); Ultracet [tramadol-acetaminophen]; Vicodin [hydrocodone-acetaminophen];  Vytorin 10-10 [ezetimibe-simvastatin]; and Percodan [oxycodone hcl-oxycodone-asa]    Temp (24hrs), Av.7 °F (36.5 °C), Min:97 °F (36.1 °C), Max:98.2 °F (36.8 °C)    Visit Vitals  /73 (BP 1 Location: Left arm, BP Patient Position: At rest)    Pulse 72    Temp 97 °F (36.1 °C)    Resp 18    Ht 5' 7\" (1.702 m)    Wt 116.1 kg (256 lb)    SpO2 99%    BMI 40.1 kg/m2       ROS: 12 point ROS obtained in details. Pertinent positives as mentioned in HPI,   otherwise negative    Physical Exam:    General: Well developed, well nourished female laying on the bed, AAOx3 NAD      General:  awake alert and oriented   HEENT:  Normocephalic, atraumatic, PERRL, EOMI, no scleral icterus or pallor; no conjunctival hemmohage; nasal and oral mucous are moist and without evidence of lesions. Neck supple, no bruits. Lymph Nodes:  no cervical, axillary or inguinal adenopathy   Lungs:  non-labored, bilaterally clear to auscultation- no crackles wheezes rales or rhonchi   Heart:  s1 and s2 irregular; no rubs or gallops, no edema, + pedal pulses   Abdomen: soft, non-distended, active bowel sounds, no hepatomegaly, no splenomegaly. no tenderness over the left abdominal pacemaker, no overlying erythema or fluctuance,no tenderness   Genitourinary: deferred   Extremities:  no clubbing, cyanosis; no joint effusions or swelling; muscle mass appropriate for age, wound vac right heel   Neurologic:  No gross focal sensory abnormalities; 5/5 muscle strength to upper extremities. 1/5 strength in lower extremities.  Cranial nerves intact   Skin:  Surgical scars abdomen, left neck well healed   Back: no paraspinal muscle guarding or rigidity, no spinal or paraspinal tenderness   Psychiatric:  No suicidal or homicidal ideations, appropriate mood and affect              Labs: Results:   Chemistry Recent Labs      08/21/18   0301   GLU  146*   NA  141   K  3.4*   CL  109*   CO2  26   BUN  18   CREA  1.20   CA  8.2*   AGAP  6   BUCR  15      CBC w/Diff Recent Labs      08/21/18   0301   WBC  5.1   RBC  3.80*   HGB  10.5*   HCT  31.3*   PLT  183   GRANS  65   LYMPH  23   EOS  3      Microbiology Recent Labs 08/20/18   1411   CULT  NO GROWTH 3 DAYS  NO ANAEROBES ISOLATED 3 DAYS          RADIOLOGY:    All available imaging studies/reports in [facility] for this admission were reviewed    Dr. Ruben Langston, Infectious Disease Specialist  [phone]  August 23, 2018  1:02 PM

## 2018-08-23 NOTE — PROGRESS NOTES
Problem: Falls - Risk of  Goal: *Absence of Falls  Document Brad Fall Risk and appropriate interventions in the flowsheet. Outcome: Progressing Towards Goal  Fall Risk Interventions:  Mobility Interventions: Bed/chair exit alarm         Medication Interventions: Bed/chair exit alarm    Elimination Interventions: Call light in reach             Problem: Pressure Injury - Risk of  Goal: *Prevention of pressure injury  Document Surjit Scale and appropriate interventions in the flowsheet.    Outcome: Progressing Towards Goal  Pressure Injury Interventions:  Sensory Interventions: Assess changes in LOC    Moisture Interventions: Absorbent underpads, Apply protective barrier, creams and emollients, Assess need for specialty bed, Check for incontinence Q2 hours and as needed, Contain wound drainage, Limit adult briefs, Maintain skin hydration (lotion/cream), Minimize layers, Moisture barrier, Offer toileting Q_hr    Activity Interventions: Pressure redistribution bed/mattress(bed type)    Mobility Interventions: Pressure redistribution bed/mattress (bed type)    Nutrition Interventions: Document food/fluid/supplement intake    Friction and Shear Interventions: Minimize layers

## 2018-08-23 NOTE — ROUTINE PROCESS
8143 assumed care of pt after bedside verbal report was given by off going nurse, pt resting in bed awake, normal saline infusing at 75 ml/hr, no acute distress noted,

## 2018-08-23 NOTE — PROGRESS NOTES
[facility] Hospitalist Group  Progress Note    Patient: Shabnam Pimentel Age: 76 y.o. : 1950 MR#: [id number] SSN: [id number]  Date/Time: 2018     Subjective:     Review of systems  S/p - foot surgery   Leg edema = bilaterally     Assessment/Plan:   1. Osteomyelitis right foot   2 HTN  3 DM2- Uncontrolled high blood glucose - HbA1c - > 10   4 HLD   5 H/o Iliopsoas abscess with paraplegia   6 Chronic Urinary retention on Indwelling cath   7 H/o PPM h/o Afib - not on anticoagulation - due to severe bleed/hematoma   8 H/o CVA   9 chronic systolic Heart failure ( overview - Her very last echocardiogram in 2015 showed that her ejection fraction was still very mildly decreased at 45-50%.  She did have a biventricular AICD placed, but due to trauma and infection in that area that had to be removed and now she has a biventricular pacemaker with a pulse generator residing in her left upper quadrant, placed back in 2012. )        PLAN   - Adjust dose lasix - home dose 40 Am and 20 PM - per patient suggested by her cardiology -   - Wound cultures pending - id consult appreciated   - Continue local wound care   - Antibiotics -  cefpodoxime till 18         Case discussed with:  [x]Patient  [x]Family  []Nursing  []Case Management  DVT Prophylaxis:  []Lovenox  []Hep SQ  []SCDs  []Coumadin   []On Heparin gtt          Objective:   VS:   Visit Vitals    /77 (BP 1 Location: Left arm, BP Patient Position: At rest)    Pulse 70    Temp 97 °F (36.1 °C)    Resp 18    Ht 5' 7\" (1.702 m)    Wt 116.1 kg (256 lb)    SpO2 97%    BMI 40.1 kg/m2      Tmax/24hrs: Temp (24hrs), Av.6 °F (36.4 °C), Min:97 °F (36.1 °C), Max:98.2 °F (36.8 °C)  IOBRIEF    Intake/Output Summary (Last 24 hours) at 18 1628  Last data filed at 18 1510   Gross per 24 hour   Intake             3943 ml   Output             2125 ml   Net             1818 ml       General:  Alert, cooperative, no acute distress   Cardiovascular: S1S2 - regular , No Murmur   Pulmonary: Equal expansion , No Use of accessory muscles , No Rales No Rhonchi    GI:  +BS in all four quadrants, soft, non-tender  Extremities:  1 + bilateral edema; 2+ dorsalis pedis pulses bilaterally  Neuro: Alert and oriented X 2.        Medications:   Current Facility-Administered Medications   Medication Dose Route Frequency    cefpodoxime (VANTIN) tablet 400 mg  400 mg Oral Q12H    insulin glargine (LANTUS) injection 14 Units  14 Units SubCUTAneous QHS    loperamide (IMODIUM) capsule 2 mg  2 mg Oral Q12H PRN    diphenhydrAMINE (BENADRYL) 12.5 mg/5 mL oral elixir 12.5 mg  12.5 mg Oral Q6H PRN    carvedilol (COREG) tablet 6.25 mg  6.25 mg Oral BID WITH MEALS    baclofen (LIORESAL) tablet 10 mg  10 mg Oral DAILY    famotidine (PEPCID) tablet 20 mg  20 mg Oral QHS    ferrous sulfate tablet 325 mg  325 mg Oral BID WITH MEALS    folic acid (FOLVITE) tablet 1 mg  1 mg Oral DAILY    furosemide (LASIX) tablet 40 mg  40 mg Oral DAILY    gabapentin (NEURONTIN) capsule 600 mg  600 mg Oral BID    insulin lispro (HUMALOG) injection 5 Units  5 Units SubCUTAneous TIDAC    pravastatin (PRAVACHOL) tablet 40 mg  40 mg Oral QHS    ondansetron (ZOFRAN) injection 4 mg  4 mg IntraVENous Q6H PRN    0.9% sodium chloride infusion  75 mL/hr IntraVENous CONTINUOUS    heparin (porcine) injection 5,000 Units  5,000 Units SubCUTAneous Q8H    acetaminophen (TYLENOL) tablet 650 mg  650 mg Oral Q6H PRN    glucose chewable tablet 16 g  4 Tab Oral PRN    glucagon (GLUCAGEN) injection 1 mg  1 mg IntraMUSCular PRN    dextrose (D50W) injection syrg 12.5-25 g  25-50 mL IntraVENous PRN    insulin lispro (HUMALOG) injection   SubCUTAneous AC&HS       Labs:    Recent Labs      08/21/18   0301   WBC  5.1   HGB  10.5*   HCT  31.3*   PLT  183     Recent Labs      08/21/18   0301   NA  141   K  3.4*   CL  109*   CO2  26   GLU  146*   BUN  18   CREA  1.20   CA  8.2* Signed By: Loiuse Martinez MD     August 23, 2018

## 2018-08-23 NOTE — ROUTINE PROCESS
Bedside and Verbal shift change report given to Dorothy Dukes (oncoming nurse) by Jeri Charles RN (offgoing nurse). Report included the following information SBAR, Kardex, Intake/Output, MAR and Recent Results.

## 2018-08-24 VITALS
RESPIRATION RATE: 16 BRPM | HEIGHT: 67 IN | DIASTOLIC BLOOD PRESSURE: 77 MMHG | TEMPERATURE: 96.6 F | OXYGEN SATURATION: 99 % | HEART RATE: 67 BPM | SYSTOLIC BLOOD PRESSURE: 148 MMHG | BODY MASS INDEX: 41.59 KG/M2 | WEIGHT: 265 LBS

## 2018-08-24 LAB
GLUCOSE BLD STRIP.AUTO-MCNC: 133 MG/DL (ref 70–110)
GLUCOSE BLD STRIP.AUTO-MCNC: 142 MG/DL (ref 70–110)
GLUCOSE BLD STRIP.AUTO-MCNC: 161 MG/DL (ref 70–110)

## 2018-08-24 PROCEDURE — 74011250637 HC RX REV CODE- 250/637: Performed by: INTERNAL MEDICINE

## 2018-08-24 PROCEDURE — 82962 GLUCOSE BLOOD TEST: CPT

## 2018-08-24 PROCEDURE — 74011250637 HC RX REV CODE- 250/637: Performed by: HOSPITALIST

## 2018-08-24 PROCEDURE — 74011250636 HC RX REV CODE- 250/636: Performed by: HOSPITALIST

## 2018-08-24 PROCEDURE — 77030012935 HC DRSG AQUACEL BMS -B

## 2018-08-24 PROCEDURE — 74011636637 HC RX REV CODE- 636/637: Performed by: HOSPITALIST

## 2018-08-24 RX ORDER — CEFPODOXIME PROXETIL 200 MG/1
400 TABLET, FILM COATED ORAL EVERY 12 HOURS
Qty: 108 TAB | Refills: 0 | Status: SHIPPED | OUTPATIENT
Start: 2018-08-24 | End: 2018-09-17 | Stop reason: ALTCHOICE

## 2018-08-24 RX ADMIN — FOLIC ACID 1 MG: 1 TABLET ORAL at 08:17

## 2018-08-24 RX ADMIN — BACLOFEN 10 MG: 10 TABLET ORAL at 08:17

## 2018-08-24 RX ADMIN — CARVEDILOL 6.25 MG: 6.25 TABLET, FILM COATED ORAL at 08:17

## 2018-08-24 RX ADMIN — HEPARIN SODIUM 5000 UNITS: 5000 INJECTION, SOLUTION INTRAVENOUS; SUBCUTANEOUS at 01:12

## 2018-08-24 RX ADMIN — FUROSEMIDE 40 MG: 40 TABLET ORAL at 08:17

## 2018-08-24 RX ADMIN — FERROUS SULFATE TAB 325 MG (65 MG ELEMENTAL FE) 325 MG: 325 (65 FE) TAB at 08:16

## 2018-08-24 RX ADMIN — INSULIN LISPRO 5 UNITS: 100 INJECTION, SOLUTION INTRAVENOUS; SUBCUTANEOUS at 08:21

## 2018-08-24 RX ADMIN — GABAPENTIN 600 MG: 300 CAPSULE ORAL at 08:16

## 2018-08-24 RX ADMIN — CEFPODOXIME PROXETIL 400 MG: 200 TABLET, FILM COATED ORAL at 08:17

## 2018-08-24 RX ADMIN — INSULIN LISPRO 5 UNITS: 100 INJECTION, SOLUTION INTRAVENOUS; SUBCUTANEOUS at 11:57

## 2018-08-24 RX ADMIN — SODIUM CHLORIDE 75 ML/HR: 900 INJECTION, SOLUTION INTRAVENOUS at 01:11

## 2018-08-24 NOTE — DIABETES MGMT
NUTRITIONAL ASSESSMENT GLYCEMIC CONTROL/ PLAN OF CARE     Shmuel Denny           76 y.o.           8/16/2018                 1. Other acute osteomyelitis of right foot ([address]       INTERVENTIONS/PLAN:   Continue inpatient monitoring and intervention  ASSESSMENT:   Pt is a 76year old female with a past medical history significant for type 2 diabetes, hypertension, CHF, CAD, and paraplegia who presented with worsening wound on right heel. Blood glucose remaining overall within targets. Noted pt being discharged     Diabetes Management:   Recent blood glucose:     8/23/2018 15:31 8/23/2018 21:49 8/24/2018 06:02 8/24/2018 11:54   121 (H) 186 (H) 142 (H) 161 (H)   Within target range (non-ICU: <140; ICU<180): [x] Yes   []  No    Current Insulin regimen:   Lantus insulin 14 units every bedtime  Prandial Lispro insulin 5 units TID with meals  Correctional Lispro insulin 4 times daily ACHS (very resistant scale)  Home medication/insulin regimen:    Lantus insulin 18 units nightly   Lispro insulin 5 units with meals  Januvia   HbA1c: 10.2% (estimated average glucose of 246 mg/dL)  Adequate glycemic control PTA:  [] Yes  [x] No     SUBJECTIVE/OBJECTIVE:   Information obtained from: patient, chart review      Diet: Diabetic consistent carbohydrate, no concentrated sweets    Patient Vitals for the past 100 hrs:   % Diet Eaten   08/21/18 1732 90 %   08/20/18 1900 100 %   08/20/18 1240 0 %     Medications: [x]    Reviewed     Most Recent POC Glucose: No results for input(s): GLU in the last 72 hours.       Labs:   Lab Results   Component Value Date/Time    Hemoglobin A1c 10.2 (H) 08/17/2018 02:40 AM     Lab Results   Component Value Date/Time    Sodium 141 08/21/2018 03:01 AM    Potassium 3.4 (L) 08/21/2018 03:01 AM    Chloride 109 (H) 08/21/2018 03:01 AM    CO2 26 08/21/2018 03:01 AM    Anion gap 6 08/21/2018 03:01 AM    Glucose 146 (H) 08/21/2018 03:01 AM    BUN 18 08/21/2018 03:01 AM    Creatinine 1.20 08/21/2018 03:01 AM Calcium 8.2 (L) 08/21/2018 03:01 AM    Magnesium 1.7 06/15/2017 08:35 PM    Phosphorus 4.0 05/15/2017 06:04 AM    Albumin 2.5 (L) 08/17/2018 02:40 AM     Anthropometrics:  BMI (calculated): 41.5  Wt Readings from Last 1 Encounters:   08/24/18 120.2 kg (265 lb)      Ht Readings from Last 1 Encounters:   08/16/18 5' 7\" (1.702 m)     Estimated Nutrition Needs:  1651-6491 Kcal/day,  grams protein/day   Based on:   [x]Actual BW    [] IBW   [] Adjusted BW      Nutrition Diagnoses:    Altered nutrition related lab value related to diabetes as evidenced by Hemoglobin A1c of 10.2%   Nutrition Interventions: diabetes education, coordination of care   Goal: Blood glucose will be within target range of  mg/dL by 8/20/18 (met)     Nutrition Monitoring and Evaluation    []     Monitor po intake on meal rounds  [x]     Continue inpatient monitoring and intervention  []     Other:    Chelsey Hernandez RD, CDE  pgr [phone]

## 2018-08-24 NOTE — PROGRESS NOTES
This  faxed the entirre packet that included [employer] form with MD signature to [facility] [phone], marked stat. Miriam at [facility] [place] said they didn't have the form with MD signature but copy of ok status from fax on 8/21 was received.  Awaiting approval from [facility]

## 2018-08-24 NOTE — ROUTINE PROCESS
Bedside and Verbal shift change report given to Lakisha Marie (oncoming nurse) by Gertrude Mcdowell RN   (offgoing nurse). Report included the following information SBAR, Kardex, Intake/Output and MAR.

## 2018-08-24 NOTE — PROGRESS NOTES
Also awaiting home health wound vac orders from physician and these will need to be faxed to  [facility] (fax: [phone]) once written by physician. LISHA Spivey, Care Manager.

## 2018-08-24 NOTE — ROUTINE PROCESS
4033 assumed care of pt after bedside verbal report was given by off going nurse, pt awake in bed, normal saline infusing at 75 ml/hr, no acute distress noted, wound vac to right heel intact without any leakages noted, will monitor

## 2018-08-24 NOTE — PROGRESS NOTES
(Assigned patient for today) This CM talked with physician and patient is ready for d/c today. Orders for home health have been written. This CM reviewed the home health agency list and FOC is signed and on chart. [facility] chose [facility]. Reff has been made and orders faxed to [facility]. ... Heather Marin Patient will also d/c home with a [employer] wound vac. Talked with Rufina Buck at [facility] and she stated that she had not received any information on this patient yet. This CM talked with previous CM of this case and she stated that she will refax [employer] paperwork to process paperwork. Informed physician that there may be a delay in patient receiving wound vac for today. Will continue to follow and assist with d/c planning for home with home health and [employer] wound vac. LISHA Sharp, Care Manager.

## 2018-08-24 NOTE — PROGRESS NOTES
Danika from [facility] called and states we are using the wrong form to request for wound vac and the vac order for pt will not be processed unless the right form is used and the doctor includes his NPI number. She states pt can go home and the vac will be delivered to pt's home. She sent to form to CM and CM gave Dr. Desmond Metzger to fill out the form and form was faxed back to [facility]. [facility] transportation will be picking up pt at 7:30pm to home.

## 2018-08-24 NOTE — DISCHARGE SUMMARY
Discharge Summary     Patient ID:  Humble Cho  [id number]  76 y.o.  1950  Body mass index is 41.5 kg/(m^2). PCP on record: Del Denver, DO    Admit date: 8/16/2018  Discharge date and time: 8/24/2018    Discharge Diagnoses:                                           1 acute on chronic osteomyelitis - right foot s/p surgery   2 DM2 - uncontrolled high blood glucose   3 H/o Paraplegia and bed bound   4 Chronic urinary retention with indwelling cath           Consults: ID and podiatry     Hospital Course by problems:  Patient admitted with worsening right foot infection , further imaging suggested osteomyelitis , Podiatry consulted - now s/p surgery and doing well and placed on PO antibiotics per ID . Ventin to 9-     Poor control of DM2 - detail d/w patient and diabetic education done by me. Home health will follow patient for his wound care , out patient follow up with Podiatry - d/w them           Patient seen and examined by me on discharge day.   Pertinent Findings:  Patient is Alert Awake and oriented   HEENT - NAD    RS - Clear , no rales no rhonchi   CVS - regular rhythm and rate acceptable    abd - benign, BS present , no Distension   EXT - no edema , no calf tenderness   Neuro - alert and awake , grossly motor and sensory intact       Significant Diagnostic Studies:  BRIEF OPERATIVE NOTE     Date of Procedure: 8/20/2018   Preoperative Diagnosis: ulcer and infection right heel  Postoperative Diagnosis: ulcer and infection right heel    Procedure(s):  DEBRIDEMENT RIGHT HEEL WITH REMOVAL INFECTED TISSUE AND BONE AND APPLICATION OF WOUND VAC  Surgeon(s) and Role:     * Krystina Blackwell DPM - Primary      Surgical Assistant: JASON     Surgical Staff:  Circ-1: Aaron Bojorquez RN  Scrub Tech-1: Joanie Goddard  Surg Asst-1: Radha Pal  Event Time In   Incision Start 1409   Incision Close        Anesthesia: MAC   Estimated Blood Loss: 0  Specimens:   ID Type Source Tests Collected by Time Destination   1 : bone right heel Preservative Heel   Dedra Velázquez DPM 8/20/2018 1414 Pathology   1 : c/s bone right heel Wound Heel CULTURE, ANAEROBIC, CULTURE, WOUND W GRAM STAIN Dedra Velázquez [facility] 8/20/2018 1411 Microbiology       Findings: osteitis   Complications: none  Implants: * No implants in log *        Final result (Exam End: 8/17/2018  4:38 PM) Open        Study Result      CT Lower extremity without contrast     Indications: Poor healing of wound at the heel of the right ankle since an  injury December 2017. Patient is diabetic. Concern for osteomyelitis.     Technique: Contiguous 1.25 mm thickness axial images were obtained through the  right ankle/foot. Sagittal and coronal MPR generated.     CT scans at this facility are performed using dose optimization technique as  appropriate with performed exam, to include automated exposure control,  adjustment of mA and/or kV according to patient's size (including appropriate  matching for site-specific examinations), or use of iterative reconstruction  technique.     Comparison: Plain films April and August 2018     Findings:      Posterior lateral heel ulcer with exposed calcaneal margin. There is erosion of  the subjacent calcaneus involving a more than 2 cm span. There is increased  sclerosis around this area within the posterior half of the calcaneus. No  abscess.     There is generalized bone demineralization of the foot. No fracture. There is  moderately-sized plantar heel spur formation.     Mild to moderate degenerative spurring of the ankle joint proper. There is no  collapse of the talar dome, but likely small degenerative subchondral cyst along  its medial margin. Some residual trabeculation within the talar dome, not a  clearly circumscribed region to suggest AVN.    More moderate multifocal degenerative spurring of the midfoot at multiple  joints.  No fracture or bone erosion.     Moderately pronounced degenerative changes of the 1st MTP joint and sesamoids. There is prominent length of the medial sesamoid likely from ankylosis with  ossification of the plantar plate.     There is the expected regional subcutaneous tissue edema that is most pronounced  at the posterior ankle region. The soft tissue ulceration does involve the  distal most medial aspect of the Achilles tendon.     Fairly diffuse atrophy of intrinsic foot musculature as with diabetic  neuropathy. There is regional atherosclerosis.     IMPRESSION  IMPRESSION[de-identified]     1. Right posterior medial heel ulcer with CT evidence of osteomyelitis at the  subjacent calcaneus. No abscess. Presumed involvement of the traversing segment  of distal most Achilles tendon.     2. Regional demineralization and other findings that more likely relates to  underlying neuropathy.     3. Moderate regional degenerative findings as well, as delineated above.     Thank you for this referral.       Imaging      CT FOOT RT WO CONT (Order #[id number]) on 8/17/2018 - Imaging Information                     Pertinent Lab Data:  No results for input(s): WBC, HGB, HCT, PLT, HGBEXT, HCTEXT, PLTEXT, HGBEXT, HCTEXT, PLTEXT in the last 72 hours. No results for input(s): NA, K, CL, CO2, GLU, BUN, CREA, CA, MG, PHOS, ALB, TBIL, SGOT, ALT, INR in the last 72 hours. No lab exists for component: INREXT, INREXT    DISCHARGE MEDICATIONS:   @  Current Discharge Medication List      START taking these medications    Details   cefpodoxime (VANTIN) 200 mg tablet Take 2 Tabs by mouth every twelve (12) hours for 27 days. Qty: 108 Tab, Refills: 0         CONTINUE these medications which have NOT CHANGED    Details   cholecalciferol, VITAMIN D3, (VITAMIN D3) 5,000 unit tab tablet Take  by mouth daily. carvedilol (COREG) 6.25 mg tablet       SITagliptin (JANUVIA) 50 mg tablet Take 50 mg by mouth daily. ferrous sulfate 325 mg (65 mg iron) tablet Take 1 Tab by mouth two (2) times daily (with meals).   Qty: 100 Tab, Refills: 0      furosemide (LASIX) 40 mg tablet 1 tab daily  Indications: Edema  Qty: 60 Tab, Refills: 0      insulin lispro (HUMALOG) 100 unit/mL injection See sliding scale  Qty: 1 Vial, Refills: 0      potassium chloride (K-DUR, KLOR-CON) 20 mEq tablet Take 1 Tab by mouth two (2) times a day. Qty: 30 Tab, Refills: 0      folic acid (FOLVITE) 1 mg tablet Take 1 Tab by mouth daily. Qty: 30 Tab, Refills: 0      insulin glargine (LANTUS) 100 unit/mL injection 5 Units by SubCUTAneous route nightly. Indications: type 2 diabetes mellitus  Qty: 1 Vial, Refills: 0    Associated Diagnoses: Type 2 diabetes mellitus with diabetic neuropathy, with long-term current use of insulin (HCC)      baclofen (LIORESAL) 10 mg tablet 10 mg daily. Lactobacillus Acidoph & Bulgar (FLORANEX) 1 million cell tab tablet Take 1 Tab by mouth two (2) times a day. Qty: 60 Tab, Refills: 0      gabapentin (NEURONTIN) 300 mg capsule Take 2 Caps by mouth two (2) times a day. Indications: NEUROPATHIC PAIN  Qty: 100 Cap, Refills: 0      famotidine (PEPCID) 20 mg tablet Take 1 Tab by mouth nightly. Qty: 30 Tab, Refills: 0      pravastatin (PRAVACHOL) 40 mg tablet Take 40 mg by mouth nightly. Indications: DYSLIPIDEMIA         STOP taking these medications       gabapentin (NEURONTIN) 600 mg tablet Comments:   Reason for Stopping:         trimethoprim-sulfamethoxazole (BACTRIM DS, SEPTRA DS) 160-800 mg per tablet Comments:   Reason for Stopping:                 My Recommended Diet, Activity, Wound Care, and follow-up labs are listed in the patient's Discharge Insturctions which I have personally completed and reviewed. Disposition:     [] Home with family     [] [place] PT/RN   [] SNF/NH   [] Inpatient Rehab/LEIGH  Condition at Discharge:  Stable    Follow up with:   PCP : Renata Santos, DO      Please follow-up tests/labs that are still pendin.  None  2.    >30 minutes spent coordinating this discharge (review instructions/follow-up, prescriptions, preparing report for sign off)    Signed:  Louise Martinez MD  8/24/2018  10:32 AM

## 2018-08-24 NOTE — ROUTINE PROCESS
1940 Bedside and Verbal shift change report given to Demetris Mcgraw RN (oncoming nurse) by DANIEL Thornton (offgoing nurse). Report included the following information SBAR, Kardex and MAR.

## 2018-08-24 NOTE — DISCHARGE INSTRUCTIONS
Osteomyelitis: Care Instructions  Your Care Instructions  Osteomyelitis (say \"vp-[id number]-kq-qs-FA-tus\") is a bone infection. It is caused by bacteria. The bacteria can infect the bone where it has been injured, or they can be carried through the blood from another area in the body. Osteomyelitis can be a short- or long-term problem. It is treated with antibiotics. You may get the antibiotics as pills or through a needle in a vein (IV). You will probably get treatment in the hospital at first. The type of treatment depends on the type of bacteria causing the infection, the bones affected, and how bad the infection is. Sometimes people need surgery to drain pus from bone or to fix damaged bone. Short-term osteomyelitis that is treated right away usually can be cured. But the long-term form sometimes comes back after treatment. You can help your chances of stopping the infection by taking your medicines as directed. Follow-up care is a key part of your treatment and safety. Be sure to make and go to all appointments, and call your doctor if you are having problems. It's also a good idea to know your test results and keep a list of the medicines you take. How can you care for yourself at home? · Take your antibiotics as directed. Do not stop taking them just because you feel better. You need to take the full course of antibiotics. · Take pain medicines exactly as directed. ¨ If the doctor gave you a prescription medicine for pain, take it as prescribed. ¨ If you are not taking a prescription pain medicine, ask your doctor if you can take an over-the-counter medicine. · Do mild exercise and stretching if your doctor says it is okay. This can help keep your bones and muscles healthy. Avoid strenuous work or exercise until your doctor says you can do it. · Consider physical therapy if your doctor suggests it. Physical therapy may help you have a normal range of movement. · Do not smoke.  Smoking can slow healing of the infection. If you need help quitting, talk to your doctor about stop-smoking programs and medicines. These can increase your chances of quitting for good. When should you call for help? Call 911 anytime you think you may need emergency care. For example, call if:    · You have severe bone pain.    Call your doctor now or seek immediate medical care if:    · You continue to have bone pain.     · You have signs of infection, such as:  ¨ Increased pain, swelling, warmth, or redness. ¨ Red streaks leading from a wound. ¨ Pus draining from a wound. ¨ A fever.    Watch closely for changes in your health, and be sure to contact your doctor if:    · You do not get better as expected. Where can you learn more? Go to http://aren-mirella.info/. Enter G877 in the search box to learn more about \"Osteomyelitis: Care Instructions. \"  Current as of: November 21, 2017  Content Version: 11.7  © 2368-6860 [employer]. Care instructions adapted under license by [employer] (which disclaims liability or warranty for this information). If you have questions about a medical condition or this instruction, always ask your healthcare professional. [address] any warranty or liability for your use of this information.

## 2018-08-24 NOTE — ROUTINE PROCESS
I was advised by the nurse that Ms. Rodriguez Care is getting discharged. I will get the bipap out of the room when nurses are done with dressing changes and such.

## 2018-08-25 LAB
BACTERIA SPEC CULT: NORMAL
BACTERIA SPEC CULT: NORMAL
GRAM STN SPEC: NORMAL
GRAM STN SPEC: NORMAL
SERVICE CMNT-IMP: NORMAL
SERVICE CMNT-IMP: NORMAL

## 2018-08-25 NOTE — PROGRESS NOTES
8/25/18 at 1700  CM paged to call pt. Called pt and she states she has not heard from [facility] and [employer]. Called [employer] and left a message for Danika and for her to call me back. Called [employer] and answering service state someone will call me back.   Informed answering service someone needs to contact pt.  1723:  Called pt and she states she has not heard from [employer]

## 2018-08-25 NOTE — PROGRESS NOTES
Pt discharged to home, picked up by [address] Wound vac removed and aquacel AG w/ dry sterile gauze applied to L heel wound as ordered prior to discharge. Wound bed moist, beefy red, no active bleeding noted. Serosanguinous drng in canister about 25ml only.

## 2018-09-04 ENCOUNTER — APPOINTMENT (OUTPATIENT)
Dept: GENERAL RADIOLOGY | Age: 68
End: 2018-09-04
Attending: EMERGENCY MEDICINE
Payer: COMMERCIAL

## 2018-09-04 ENCOUNTER — APPOINTMENT (OUTPATIENT)
Dept: CT IMAGING | Age: 68
End: 2018-09-04
Attending: EMERGENCY MEDICINE
Payer: COMMERCIAL

## 2018-09-04 ENCOUNTER — APPOINTMENT (OUTPATIENT)
Dept: VASCULAR SURGERY | Age: 68
End: 2018-09-04
Attending: EMERGENCY MEDICINE
Payer: COMMERCIAL

## 2018-09-04 ENCOUNTER — HOSPITAL ENCOUNTER (EMERGENCY)
Age: 68
Discharge: HOME | End: 2018-09-04
Attending: EMERGENCY MEDICINE
Payer: COMMERCIAL

## 2018-09-04 VITALS
WEIGHT: 245 LBS | HEART RATE: 73 BPM | RESPIRATION RATE: 14 BRPM | BODY MASS INDEX: 38.37 KG/M2 | DIASTOLIC BLOOD PRESSURE: 80 MMHG | SYSTOLIC BLOOD PRESSURE: 138 MMHG | TEMPERATURE: 97.7 F | OXYGEN SATURATION: 100 %

## 2018-09-04 DIAGNOSIS — R06.00 DYSPNEA, UNSPECIFIED TYPE: Primary | ICD-10-CM

## 2018-09-04 DIAGNOSIS — J20.9 ACUTE BRONCHITIS, UNSPECIFIED ORGANISM: ICD-10-CM

## 2018-09-04 LAB
ALBUMIN SERPL-MCNC: 3.3 G/DL (ref 3.4–5)
ALBUMIN/GLOB SERPL: 0.6 {RATIO} (ref 0.8–1.7)
ALP SERPL-CCNC: 224 U/L (ref 45–117)
ALT SERPL-CCNC: 47 U/L (ref 13–56)
ANION GAP SERPL CALC-SCNC: 7 MMOL/L (ref 3–18)
APTT PPP: 31 SEC (ref 23–36.4)
AST SERPL-CCNC: 35 U/L (ref 15–37)
ATRIAL RATE: 69 BPM
BASOPHILS # BLD: 0.1 K/UL (ref 0–0.1)
BASOPHILS NFR BLD: 1 % (ref 0–2)
BILIRUB SERPL-MCNC: 0.8 MG/DL (ref 0.2–1)
BNP SERPL-MCNC: 1921 PG/ML (ref 0–900)
BUN SERPL-MCNC: 28 MG/DL (ref 7–18)
BUN/CREAT SERPL: 19 (ref 12–20)
CALCIUM SERPL-MCNC: 9.3 MG/DL (ref 8.5–10.1)
CALCULATED P AXIS, ECG09: 80 DEGREES
CALCULATED R AXIS, ECG10: 60 DEGREES
CALCULATED T AXIS, ECG11: 21 DEGREES
CHLORIDE SERPL-SCNC: 105 MMOL/L (ref 100–108)
CK MB CFR SERPL CALC: 1.5 % (ref 0–4)
CK MB SERPL-MCNC: 3.6 NG/ML (ref 5–25)
CK SERPL-CCNC: 233 U/L (ref 26–192)
CO2 SERPL-SCNC: 28 MMOL/L (ref 21–32)
CREAT SERPL-MCNC: 1.45 MG/DL (ref 0.6–1.3)
DIAGNOSIS, 93000: NORMAL
DIFFERENTIAL METHOD BLD: ABNORMAL
EOSINOPHIL # BLD: 0.3 K/UL (ref 0–0.4)
EOSINOPHIL NFR BLD: 4 % (ref 0–5)
ERYTHROCYTE [DISTWIDTH] IN BLOOD BY AUTOMATED COUNT: 15.2 % (ref 11.6–14.5)
GLOBULIN SER CALC-MCNC: 5.2 G/DL (ref 2–4)
GLUCOSE SERPL-MCNC: 162 MG/DL (ref 74–99)
HCT VFR BLD AUTO: 35.4 % (ref 35–45)
HGB BLD-MCNC: 11.8 G/DL (ref 12–16)
INR PPP: 1 (ref 0.8–1.2)
LYMPHOCYTES # BLD: 1.9 K/UL (ref 0.9–3.6)
LYMPHOCYTES NFR BLD: 27 % (ref 21–52)
MAGNESIUM SERPL-MCNC: 2 MG/DL (ref 1.6–2.6)
MCH RBC QN AUTO: 28 PG (ref 24–34)
MCHC RBC AUTO-ENTMCNC: 33.3 G/DL (ref 31–37)
MCV RBC AUTO: 83.9 FL (ref 74–97)
MONOCYTES # BLD: 0.3 K/UL (ref 0.05–1.2)
MONOCYTES NFR BLD: 4 % (ref 3–10)
NEUTS SEG # BLD: 4.5 K/UL (ref 1.8–8)
NEUTS SEG NFR BLD: 64 % (ref 40–73)
P-R INTERVAL, ECG05: 132 MS
PLATELET # BLD AUTO: 222 K/UL (ref 135–420)
PMV BLD AUTO: 9.7 FL (ref 9.2–11.8)
POTASSIUM SERPL-SCNC: 3.9 MMOL/L (ref 3.5–5.5)
PROT SERPL-MCNC: 8.5 G/DL (ref 6.4–8.2)
PROTHROMBIN TIME: 13.1 SEC (ref 11.5–15.2)
Q-T INTERVAL, ECG07: 488 MS
QRS DURATION, ECG06: 198 MS
QTC CALCULATION (BEZET), ECG08: 522 MS
RBC # BLD AUTO: 4.22 M/UL (ref 4.2–5.3)
SODIUM SERPL-SCNC: 140 MMOL/L (ref 136–145)
TROPONIN I SERPL-MCNC: <0.02 NG/ML (ref 0–0.04)
VENTRICULAR RATE, ECG03: 69 BPM
WBC # BLD AUTO: 7.1 K/UL (ref 4.6–13.2)

## 2018-09-04 PROCEDURE — 74011250637 HC RX REV CODE- 250/637: Performed by: EMERGENCY MEDICINE

## 2018-09-04 PROCEDURE — 85730 THROMBOPLASTIN TIME PARTIAL: CPT | Performed by: EMERGENCY MEDICINE

## 2018-09-04 PROCEDURE — 93005 ELECTROCARDIOGRAM TRACING: CPT

## 2018-09-04 PROCEDURE — 80053 COMPREHEN METABOLIC PANEL: CPT | Performed by: EMERGENCY MEDICINE

## 2018-09-04 PROCEDURE — 74011250636 HC RX REV CODE- 250/636: Performed by: EMERGENCY MEDICINE

## 2018-09-04 PROCEDURE — 96374 THER/PROPH/DIAG INJ IV PUSH: CPT

## 2018-09-04 PROCEDURE — 77030029684 HC NEB SM VOL KT MONA -A

## 2018-09-04 PROCEDURE — 74011636320 HC RX REV CODE- 636/320: Performed by: EMERGENCY MEDICINE

## 2018-09-04 PROCEDURE — 74011000250 HC RX REV CODE- 250: Performed by: EMERGENCY MEDICINE

## 2018-09-04 PROCEDURE — 83880 ASSAY OF NATRIURETIC PEPTIDE: CPT | Performed by: EMERGENCY MEDICINE

## 2018-09-04 PROCEDURE — 71045 X-RAY EXAM CHEST 1 VIEW: CPT

## 2018-09-04 PROCEDURE — 93971 EXTREMITY STUDY: CPT

## 2018-09-04 PROCEDURE — 83735 ASSAY OF MAGNESIUM: CPT | Performed by: EMERGENCY MEDICINE

## 2018-09-04 PROCEDURE — 99283 EMERGENCY DEPT VISIT LOW MDM: CPT

## 2018-09-04 PROCEDURE — 82550 ASSAY OF CK (CPK): CPT | Performed by: EMERGENCY MEDICINE

## 2018-09-04 PROCEDURE — 94640 AIRWAY INHALATION TREATMENT: CPT

## 2018-09-04 PROCEDURE — 71275 CT ANGIOGRAPHY CHEST: CPT

## 2018-09-04 PROCEDURE — 85025 COMPLETE CBC W/AUTO DIFF WBC: CPT | Performed by: EMERGENCY MEDICINE

## 2018-09-04 PROCEDURE — 85610 PROTHROMBIN TIME: CPT | Performed by: EMERGENCY MEDICINE

## 2018-09-04 RX ORDER — IPRATROPIUM BROMIDE AND ALBUTEROL SULFATE 2.5; .5 MG/3ML; MG/3ML
3 SOLUTION RESPIRATORY (INHALATION)
Status: COMPLETED | OUTPATIENT
Start: 2018-09-04 | End: 2018-09-04

## 2018-09-04 RX ORDER — BENZONATATE 100 MG/1
200 CAPSULE ORAL
Qty: 30 CAP | Refills: 0 | Status: SHIPPED | OUTPATIENT
Start: 2018-09-04 | End: 2018-09-11

## 2018-09-04 RX ORDER — DIPHENHYDRAMINE HYDROCHLORIDE 50 MG/ML
25 INJECTION, SOLUTION INTRAMUSCULAR; INTRAVENOUS ONCE
Status: COMPLETED | OUTPATIENT
Start: 2018-09-04 | End: 2018-09-04

## 2018-09-04 RX ORDER — BENZONATATE 100 MG/1
200 CAPSULE ORAL
Status: COMPLETED | OUTPATIENT
Start: 2018-09-04 | End: 2018-09-04

## 2018-09-04 RX ORDER — ALBUTEROL SULFATE 90 UG/1
2 AEROSOL, METERED RESPIRATORY (INHALATION)
Qty: 1 INHALER | Refills: 0 | Status: SHIPPED | OUTPATIENT
Start: 2018-09-04 | End: 2018-09-09

## 2018-09-04 RX ADMIN — DIPHENHYDRAMINE HYDROCHLORIDE 25 MG: 50 INJECTION, SOLUTION INTRAMUSCULAR; INTRAVENOUS at 17:12

## 2018-09-04 RX ADMIN — IOPAMIDOL 78 ML: 755 INJECTION, SOLUTION INTRAVENOUS at 19:25

## 2018-09-04 RX ADMIN — BENZONATATE 200 MG: 100 CAPSULE ORAL at 16:13

## 2018-09-04 RX ADMIN — IPRATROPIUM BROMIDE AND ALBUTEROL SULFATE 3 ML: .5; 3 SOLUTION RESPIRATORY (INHALATION) at 15:00

## 2018-09-04 NOTE — ED TRIAGE NOTES
Patient arrived from primary care office c/o chest pain and shortness of breath. Patient hx heart failure, patient takes several home medications. Patient aaox4 and wheel-chair bound

## 2018-09-04 NOTE — ED PROVIDER NOTES
"Chief Complaint   Patient presents with     Results     MRI Right Knee/ xrays first       Initial /74  Pulse 62  Temp 98.5  F (36.9  C) (Tympanic)  Ht 5' 5\" (1.651 m)  Wt 230 lb (104.3 kg)  SpO2 96%  BMI 38.27 kg/m2 Estimated body mass index is 38.27 kg/(m^2) as calculated from the following:    Height as of this encounter: 5' 5\" (1.651 m).    Weight as of this encounter: 230 lb (104.3 kg).  Medication Reconciliation: complete   Radha Ruth      " EMERGENCY DEPARTMENT HISTORY AND PHYSICAL EXAM 
 
12:06 PM 
 
 
Date: 9/4/2018 Patient Name: Su Newsome History of Presenting Illness Chief Complaint Patient presents with  Chest Pain  Shortness of Breath History Provided By: Patient Chief Complaint: Shortness of breath Duration: 2 Days Timing:  Intermittent Location: Chest 
Severity: Moderate Modifying Factors: Exacerbated by exertion Associated Symptoms: Cough that is progressively worsening and intermittent chest pain described as chest tightness onset 2 days ago. Patient denies any other associated symptoms or complaints. Additional History (Context): Su Newsome is a 76 y.o. female with a past medical history of obesity, nonischemic cardiomyopathy, diabetic neuropathy associated with type 2 DM, history of DVT, dyslipidemia, history of complete heart block, chronic systolic heart failure, chronic anemia, and acute paraplegia who presents with c/o shortness of breath onset 2 days ago. Patient describes the shortness of breath as intermittent, moderate, and is exacerbated by exertion. She has associated symptoms of cough and intermittent chest pain described as chest tightness onset 2 days ago. Patient was sent over by Dr. Mikayla Jewell for shortness of breath and chest pain. She has a history of right heel surgery with Dr. Lesia Councilman and is currently taking antibiotics for this. Patient does not take any blood thinner medications at home but was given blood thinners while at the hospital for her surgery. Patient denies any other associated symptoms or complaints. PCP: Maikel Adan DO 
 
Current Facility-Administered Medications Medication Dose Route Frequency Provider Last Rate Last Dose  iopamidol (ISOVUE-370) 76 % injection  mL   mL IntraVENous RAD ONCE Lea Price MD      
 
Current Outpatient Prescriptions Medication Sig Dispense Refill  benzonatate (TESSALON PERLES) 100 mg capsule Take 2 Caps by mouth three (3) times daily as needed for Cough for up to 7 days. 30 Cap 0  
 albuterol (PROVENTIL HFA, VENTOLIN HFA, PROAIR HFA) 90 mcg/actuation inhaler Take 2 Puffs by inhalation every four (4) hours as needed for Wheezing or Shortness of Breath for up to 5 days. 1 Inhaler 0  
 cefpodoxime (VANTIN) 200 mg tablet Take 2 Tabs by mouth every twelve (12) hours for 27 days. 108 Tab 0  cholecalciferol, VITAMIN D3, (VITAMIN D3) 5,000 unit tab tablet Take  by mouth daily.  carvedilol (COREG) 6.25 mg tablet  baclofen (LIORESAL) 10 mg tablet 10 mg daily.  SITagliptin (JANUVIA) 50 mg tablet Take 50 mg by mouth daily.  ferrous sulfate 325 mg (65 mg iron) tablet Take 1 Tab by mouth two (2) times daily (with meals). 100 Tab 0  
 Lactobacillus Acidoph & Bulgar (FLORANEX) 1 million cell tab tablet Take 1 Tab by mouth two (2) times a day. 60 Tab 0  
 furosemide (LASIX) 40 mg tablet 1 tab daily  Indications: Edema (Patient taking differently: 40 mg. 1 tab daily  Indications: Edema, patient taking 60mg for the past 2 weeks) 60 Tab 0  
 gabapentin (NEURONTIN) 300 mg capsule Take 2 Caps by mouth two (2) times a day. Indications: NEUROPATHIC PAIN 100 Cap 0  
 insulin lispro (HUMALOG) 100 unit/mL injection See sliding scale (Patient taking differently: See sliding scale  Indications: patient taking 5 units with meals) 1 Vial 0  
 potassium chloride (K-DUR, KLOR-CON) 20 mEq tablet Take 1 Tab by mouth two (2) times a day. 30 Tab 0  
 folic acid (FOLVITE) 1 mg tablet Take 1 Tab by mouth daily. 30 Tab 0  
 insulin glargine (LANTUS) 100 unit/mL injection 5 Units by SubCUTAneous route nightly. Indications: type 2 diabetes mellitus (Patient taking differently: 18 Units by SubCUTAneous route nightly. Indications: type 2 diabetes mellitus) 1 Vial 0  
 famotidine (PEPCID) 20 mg tablet Take 1 Tab by mouth nightly.  (Patient taking differently: Take 20 mg by mouth daily as needed.) 30 Tab 0  pravastatin (PRAVACHOL) 40 mg tablet Take 40 mg by mouth nightly. Indications: DYSLIPIDEMIA Past History Past Medical History: 
Past Medical History:  
Diagnosis Date  Acute paraplegia ([address] 4/20/2017  Benign hypertensive heart disease with systolic CHF, NYHA class 2 ([address] 9/5/2012  Biventricular implantable cardioverter-defibrillator in situ 04/28/2005 Upgraded to BiV AICD; gen change 4/2008; pocket revision 10/2009; Abdominal - done on 8/22/2012 by Dr. Salazar Kaye  Cardiac cath 08/15/1996 Patent coronaries. Elev LVEDP. EF 50-55%.  Cardiac echocardiogram 06/23/2015 Ltd study. EF 45-50%. Mild, diffuse hypk. Severe apical hypk. No mass or thrombus was clearly identified, although imaging was suboptimal.    
 Cardiac nuclear imaging test 06/19/2015 Fixed distal apical, distal septal defect more likely due to RV pacing than prior infarct. No ischemia. EF 46%. RWMA c/w RV pacing. Nondiagnostic EKG on pharm stress test.    
 Cardiovascular lower extremity venous duplex 09/04/2012 Acute, non-occlusive DVT in CFV on right. No DVT on left. No superficial thrombosis bilaterally.  Cardiovascular upper extremity venous duplex 08/27/2012 DVT in axillary vein on left. Left subclavian was not visualized.  Chronic anemia 9/5/2012  Chronic systolic heart failure ([address]  Decreased calculated glomerular filtration rate (GFR) 3/30/2017 Calculated GFR equivalent to that of CKD stage 3 = 30-59 ml/min  Diabetic neuropathy associated with type 2 diabetes mellitus ([address] 6/28/2011  Difficult airway for intubation 08/22/2012  
 see anesthesia airway note  Dyslipidemia 6/28/2011  Gout  History of complete heart block 6/28/2011  History of Coumadin therapy Anticoagulation for DVT of the LUE; Discontinued on 3/30/2017  History of deep venous thrombosis 9/5/2012 Left upper extremity  History of pyelonephritis 3/30/2017  Left bundle branch block (LBBB) on electrocardiogram 6/28/2011  Nonischemic cardiomyopathy ([facility] 6/28/2011  Obesity (BMI 35.0-39.9 without comorbidity) 3/13/2017  Obstructive sleep apnea on CPAP 2/7/2012  Psoas abscess, right ([address] 4/20/2017  Psoas hematoma, right, secondary to anticoagulant therapy 3/30/2017  Type 2 diabetes mellitus with diabetic neuropathy ([facility] 6/28/2011 Past Surgical History: 
Past Surgical History:  
Procedure Laterality Date  HX CARPAL TUNNEL RELEASE  4/07 right [address]  HX OTHER SURGICAL  6/11/2012 AICD revision  HX PACEMAKER  4/28/2005 [employer] AICD Family History: 
Family History Problem Relation Age of Onset  Cancer Father Leukemia Social History: 
Social History Substance Use Topics  Smoking status: Never Smoker  Smokeless tobacco: Never Used  Alcohol use No  
 
 
Allergies: Allergies Allergen Reactions  Vancomycin Itching  Ampicillin Itching  Bactrim [Sulfamethoxazole-Trimethoprim] Unknown (comments)  Blueberry Swelling Causes throat swelling  Ciprofloxacin Itching  Codeine Other (comments) Jumpy feeling  Crestor [Rosuvastatin] Itching  Darvocet A500 [Propoxyphene N-Acetaminophen] Itching  Demerol [Meperidine] Itching  Levaquin [Levofloxacin] Itching  Lipitor [Atorvastatin] Myalgia  Magnesium Oxide Itching  
  nausea  Minocin [Minocycline] Unknown (comments)  Pcn [Penicillins] Itching  Pravachol [Pravastatin] Swelling Swelling in mouth. Not allergic per patient, takes at home  Shellfish Derived Unknown (comments)  Sulfa (Sulfonamide Antibiotics) Itching  Ultracet [Tramadol-Acetaminophen] Itching  Vicodin [Hydrocodone-Acetaminophen] Unknown (comments)  Vytorin 10-10 [Ezetimibe-Simvastatin] Myalgia  Percodan [Oxycodone Hcl-Oxycodone-Asa] Itching Review of Systems Review of Systems Constitutional: Negative for activity change, fatigue and fever. HENT: Negative for congestion and rhinorrhea. Eyes: Negative for visual disturbance. Respiratory: Positive for shortness of breath. Cardiovascular: Positive for chest pain. Negative for palpitations. Gastrointestinal: Negative for abdominal pain, diarrhea, nausea and vomiting. Genitourinary: Negative for dysuria and hematuria. Musculoskeletal: Negative for back pain. Skin: Negative for rash. Neurological: Negative for dizziness, weakness and light-headedness. All other systems reviewed and are negative. Physical Exam  
 
Visit Vitals  /68 (BP 1 Location: Left arm, BP Patient Position: At rest)  Pulse 83  Temp 98.6 °F (37 °C)  Resp 18  Wt 111.1 kg (245 lb)  SpO2 94%  BMI 38.37 kg/m2 Physical Exam  
Constitutional: She is oriented to person, place, and time. She appears well-developed and well-nourished. No distress. In a  Wheelchair HENT:  
Head: Normocephalic and atraumatic. Right Ear: External ear normal.  
Left Ear: External ear normal.  
Nose: Nose normal.  
Mouth/Throat: Oropharynx is clear and moist.  
Eyes: Conjunctivae and EOM are normal. Pupils are equal, round, and reactive to light. No scleral icterus. Neck: Normal range of motion. Neck supple. No JVD present. No tracheal deviation present. No thyromegaly present. Cardiovascular: Normal rate, regular rhythm, normal heart sounds and intact distal pulses. Exam reveals no gallop and no friction rub. No murmur heard. Sx scarring Pulmonary/Chest: Effort normal. She exhibits no tenderness. Rhonchi Abdominal: Soft. Bowel sounds are normal. She exhibits no distension. There is no tenderness. There is no rebound and no guarding. Musculoskeletal: She exhibits edema and tenderness. R LE diffusely swollen with wound vac in place, distal pulses intact, no erythema Lymphadenopathy:  
  She has no cervical adenopathy. Neurological: She is alert and oriented to person, place, and time. No cranial nerve deficit. Coordination normal.  
Chronic weakness B LE, gait not observed, moves UE Skin: Skin is warm and dry. Psychiatric: She has a normal mood and affect. Her behavior is normal. Judgment and thought content normal.  
Nursing note and vitals reviewed. Diagnostic Study Results Labs - Recent Results (from the past 12 hour(s)) EKG, 12 LEAD, INITIAL Collection Time: 09/04/18 11:06 AM  
Result Value Ref Range Ventricular Rate 69 BPM  
 Atrial Rate 69 BPM  
 P-R Interval 132 ms QRS Duration 198 ms Q-T Interval 488 ms QTC Calculation (Bezet) 522 ms Calculated P Axis 80 degrees Calculated R Axis 60 degrees Calculated T Axis 21 degrees Diagnosis Electronic ventricular pacemaker When compared with ECG of 17-AUG-2018 14:33, 
Vent. rate has decreased BY  12 BPM 
Confirmed by Maile Perez ([id number]) on 9/4/2018 2:54:13 PM 
  
CARDIAC PANEL,(CK, CKMB & TROPONIN) Collection Time: 09/04/18 12:25 PM  
Result Value Ref Range  (H) 26 - 192 U/L  
 CK - MB 3.6 (H) <3.6 ng/ml CK-MB Index 1.5 0.0 - 4.0 % Troponin-I, Qt. <0.02 0.0 - 0.045 NG/ML  
CBC WITH AUTOMATED DIFF Collection Time: 09/04/18 12:25 PM  
Result Value Ref Range WBC 7.1 4.6 - 13.2 K/uL  
 RBC 4.22 4.20 - 5.30 M/uL  
 HGB 11.8 (L) 12.0 - 16.0 g/dL HCT 35.4 35.0 - 45.0 % MCV 83.9 74.0 - 97.0 FL  
 MCH 28.0 24.0 - 34.0 PG  
 MCHC 33.3 31.0 - 37.0 g/dL  
 RDW 15.2 (H) 11.6 - 14.5 % PLATELET 869 625 - 824 K/uL MPV 9.7 9.2 - 11.8 FL  
 NEUTROPHILS 64 40 - 73 % LYMPHOCYTES 27 21 - 52 % MONOCYTES 4 3 - 10 % EOSINOPHILS 4 0 - 5 % BASOPHILS 1 0 - 2 %  
 ABS. NEUTROPHILS 4.5 1.8 - 8.0 K/UL  
 ABS. LYMPHOCYTES 1.9 0.9 - 3.6 K/UL ABS. MONOCYTES 0.3 0.05 - 1.2 K/UL  
 ABS. EOSINOPHILS 0.3 0.0 - 0.4 K/UL  
 ABS. BASOPHILS 0.1 0.0 - 0.1 K/UL  
 DF AUTOMATED METABOLIC PANEL, COMPREHENSIVE Collection Time: 09/04/18 12:25 PM  
Result Value Ref Range Sodium 140 136 - 145 mmol/L Potassium 3.9 3.5 - 5.5 mmol/L Chloride 105 100 - 108 mmol/L  
 CO2 28 21 - 32 mmol/L Anion gap 7 3.0 - 18 mmol/L Glucose 162 (H) 74 - 99 mg/dL BUN 28 (H) 7.0 - 18 MG/DL Creatinine 1.45 (H) 0.6 - 1.3 MG/DL  
 BUN/Creatinine ratio 19 12 - 20 GFR est AA 44 (L) >60 ml/min/1.73m2 GFR est non-AA 36 (L) >60 ml/min/1.73m2 Calcium 9.3 8.5 - 10.1 MG/DL Bilirubin, total 0.8 0.2 - 1.0 MG/DL  
 ALT (SGPT) 47 13 - 56 U/L  
 AST (SGOT) 35 15 - 37 U/L Alk. phosphatase 224 (H) 45 - 117 U/L Protein, total 8.5 (H) 6.4 - 8.2 g/dL Albumin 3.3 (L) 3.4 - 5.0 g/dL Globulin 5.2 (H) 2.0 - 4.0 g/dL A-G Ratio 0.6 (L) 0.8 - 1.7 NT-PRO BNP Collection Time: 09/04/18 12:25 PM  
Result Value Ref Range NT pro-BNP 1921 (H) 0 - 900 PG/ML  
PROTHROMBIN TIME + INR Collection Time: 09/04/18 12:25 PM  
Result Value Ref Range Prothrombin time 13.1 11.5 - 15.2 sec INR 1.0 0.8 - 1.2 PTT Collection Time: 09/04/18 12:25 PM  
Result Value Ref Range aPTT 31.0 23.0 - 36.4 SEC MAGNESIUM Collection Time: 09/04/18 12:25 PM  
Result Value Ref Range Magnesium 2.0 1.6 - 2.6 mg/dL Radiologic Studies -  
XR CHEST SNGL V Final Result CTA CHEST W OR W WO CONT    (Results Pending) Xr Chest Sngl V Result Date: 9/4/2018 INDICATION:  Dyspnea. COMPARISON:  6/17 FINDINGS: A portable AP radiograph of the chest was obtained at 1339 hours. Post surgical changes of the cardiac silhouette and cardiac silhouette are stable. Pulmonary vascularity is within normal limits. No confluent consolidation. No pneumothorax. No acute osseous abnormality. IMPRESSION: No definite acute cardiopulmonary process.  
  
 
2:25 PM 
 RADIOLOGY FINDINGS Chest X-ray shows: no acute process Pending review by Radiologist 
Recorded by Jacey Kirkland ED Scribe, as dictated by Alex Mora DO Medical Decision Making I am the first provider for this patient. I reviewed the vital signs, available nursing notes, past medical history, past surgical history, family history and social history. Vital Signs-Reviewed the patient's vital signs. Pulse Oximetry Analysis -  94% on room air (normal) Records Reviewed: Nursing Notes (Time of Review: 12:06 PM) 
 
ED Course: Progress Notes, Reevaluation, and Consults: 
3:12 PM 
IVC filter as seen on CT scan from 1 year ago. 4:02 PM 
Dr. Sonny Rodriguez states that patient does not need anticoagulation for chronic DVT. Provider Notes (Medical Decision Making): Pt is a 67yo female with a hx of psoas hematoma, paraplegia now uses a wheel chair, DM, gout, CHF with ICD, most recently with chronic OM of the heel s/p surgery with wound vac (that was delayed) and is on Vantin presents with cough, dyspnea, and increasing R LE swelling. Pt has a hx of UE DVT and is not on AC. Will follow cardiac labs, CXR, duplex R LE, then reevaluate. Paul Ford DO 12:45 PM 
 
 
Diagnosis Clinical Impression: 1. Dyspnea, unspecified type 2. Acute bronchitis, unspecified organism Disposition: 8:10 PM : Pt care transferred to Dr. Maya Chi, ED provider. History of patient complaint(s), available diagnostic reports and current treatment plan has been discussed thoroughly. Bedside rounding on patient occured : yes . Intended disposition of patient : Pending Pending diagnostics reports and/or labs (please list): CTA Chest. 
 
Follow-up Information Follow up With Details Comments Contact Info Scar De La Paz DO Schedule an appointment as soon as possible for a visit ED visit follow up [facility] [address] [place] [id number] [phone] [facility] EMERGENCY DEPT  As needed, If symptoms worsen [address] [place] [address] [address] Patient's Medications Start Taking ALBUTEROL (PROVENTIL HFA, VENTOLIN HFA, PROAIR HFA) 90 MCG/ACTUATION INHALER    Take 2 Puffs by inhalation every four (4) hours as needed for Wheezing or Shortness of Breath for up to 5 days. BENZONATATE (TESSALON PERLES) 100 MG CAPSULE    Take 2 Caps by mouth three (3) times daily as needed for Cough for up to 7 days. Continue Taking BACLOFEN (LIORESAL) 10 MG TABLET    10 mg daily. CARVEDILOL (COREG) 6.25 MG TABLET      
 CEFPODOXIME (VANTIN) 200 MG TABLET    Take 2 Tabs by mouth every twelve (12) hours for 27 days. CHOLECALCIFEROL, VITAMIN D3, (VITAMIN D3) 5,000 UNIT TAB TABLET    Take  by mouth daily. FAMOTIDINE (PEPCID) 20 MG TABLET    Take 1 Tab by mouth nightly. FERROUS SULFATE 325 MG (65 MG IRON) TABLET    Take 1 Tab by mouth two (2) times daily (with meals). FOLIC ACID (FOLVITE) 1 MG TABLET    Take 1 Tab by mouth daily. FUROSEMIDE (LASIX) 40 MG TABLET    1 tab daily  Indications: Edema GABAPENTIN (NEURONTIN) 300 MG CAPSULE    Take 2 Caps by mouth two (2) times a day. Indications: NEUROPATHIC PAIN INSULIN GLARGINE (LANTUS) 100 UNIT/ML INJECTION    5 Units by SubCUTAneous route nightly. Indications: type 2 diabetes mellitus INSULIN LISPRO (HUMALOG) 100 UNIT/ML INJECTION    See sliding scale LACTOBACILLUS ACIDOPH & BULGAR (FLORANEX) 1 MILLION CELL TAB TABLET    Take 1 Tab by mouth two (2) times a day. POTASSIUM CHLORIDE (K-DUR, KLOR-CON) 20 MEQ TABLET    Take 1 Tab by mouth two (2) times a day. PRAVASTATIN (PRAVACHOL) 40 MG TABLET    Take 40 mg by mouth nightly. Indications: DYSLIPIDEMIA  
 SITAGLIPTIN (JANUVIA) 50 MG TABLET    Take 50 mg by mouth daily. These Medications have changed No medications on file Stop Taking No medications on file  
 
_______________________________ Attestations: Scribe Attestation Renate Boucher acting as a scribe for and in the presence of Demetri Hui MD     
September 04, 2018 at 12:06 PM 
    
Provider Attestation:     
I personally performed the services described in the documentation, reviewed the documentation, as recorded by the scribe in my presence, and it accurately and completely records my words and actions. September 04, 2018 at 12:06 PM - Demetri Hui MD   
_______________________________

## 2018-09-05 NOTE — ED NOTES
Discharge instructions reviewed with pt. Pt voiced understanding. Pt wheeled self to lobby in motorized wheelchair

## 2018-09-05 NOTE — DISCHARGE INSTRUCTIONS
Shortness of Breath: Care Instructions  Your Care Instructions  Shortness of breath has many causes. Sometimes conditions such as anxiety can lead to shortness of breath. Some people get mild shortness of breath when they exercise. Trouble breathing also can be a symptom of a serious problem, such as asthma, lung disease, emphysema, heart problems, and pneumonia. If your shortness of breath continues, you may need tests and treatment. Watch for any changes in your breathing and other symptoms. Follow-up care is a key part of your treatment and safety. Be sure to make and go to all appointments, and call your doctor if you are having problems. It's also a good idea to know your test results and keep a list of the medicines you take. How can you care for yourself at home? · Do not smoke or allow others to smoke around you. If you need help quitting, talk to your doctor about stop-smoking programs and medicines. These can increase your chances of quitting for good. · Get plenty of rest and sleep. · Take your medicines exactly as prescribed. Call your doctor if you think you are having a problem with your medicine. · Find healthy ways to deal with stress. ¨ Exercise daily. ¨ Get plenty of sleep. ¨ Eat regularly and well. When should you call for help? Call 911 anytime you think you may need emergency care. For example, call if:    · You have severe shortness of breath.     · You have symptoms of a heart attack. These may include:  ¨ Chest pain or pressure, or a strange feeling in the chest.  ¨ Sweating. ¨ Shortness of breath. ¨ Nausea or vomiting. ¨ Pain, pressure, or a strange feeling in the back, neck, jaw, or upper belly or in one or both shoulders or arms. ¨ Lightheadedness or sudden weakness. ¨ A fast or irregular heartbeat. After you call 911, the  may tell you to chew 1 adult-strength or 2 to 4 low-dose aspirin. Wait for an ambulance.  Do not try to drive yourself.    Call your doctor now or seek immediate medical care if:    · Your shortness of breath gets worse or you start to wheeze. Wheezing is a high-pitched sound when you breathe.     · You wake up at night out of breath or have to prop your head up on several pillows to breathe.     · You are short of breath after only light activity or while at rest.    Watch closely for changes in your health, and be sure to contact your doctor if:    · You do not get better over the next 1 to 2 days. Where can you learn more? Go to http://aren-mirella.info/. Enter S780 in the search box to learn more about \"Shortness of Breath: Care Instructions. \"  Current as of: December 6, 2017  Content Version: 11.7  © 1739-4685 [employer]. Care instructions adapted under license by [employer] (which disclaims liability or warranty for this information). If you have questions about a medical condition or this instruction, always ask your healthcare professional. [place] [address] any warranty or liability for your use of this information. Bronchitis: Care Instructions  Your Care Instructions    Bronchitis is inflammation of the bronchial tubes, which carry air to the lungs. The tubes swell and produce mucus, or phlegm. The mucus and inflamed bronchial tubes make you cough. You may have trouble breathing. Most cases of bronchitis are caused by viruses like those that cause colds. Antibiotics usually do not help and they may be harmful. Bronchitis usually develops rapidly and lasts about 2 to 3 weeks in otherwise healthy people. Follow-up care is a key part of your treatment and safety. Be sure to make and go to all appointments, and call your doctor if you are having problems. It's also a good idea to know your test results and keep a list of the medicines you take. How can you care for yourself at home? · Take all medicines exactly as prescribed.  Call your doctor if you think you are having a problem with your medicine. · Get some extra rest.  · Take an over-the-counter pain medicine, such as acetaminophen (Tylenol), ibuprofen (Advil, Motrin), or naproxen (Aleve) to reduce fever and relieve body aches. Read and follow all instructions on the label. · Do not take two or more pain medicines at the same time unless the doctor told you to. Many pain medicines have acetaminophen, which is Tylenol. Too much acetaminophen (Tylenol) can be harmful. · Take an over-the-counter cough medicine that contains dextromethorphan to help quiet a dry, hacking cough so that you can sleep. Avoid cough medicines that have more than one active ingredient. Read and follow all instructions on the label. · Breathe moist air from a humidifier, hot shower, or sink filled with hot water. The heat and moisture will thin mucus so you can cough it out. · Do not smoke. Smoking can make bronchitis worse. If you need help quitting, talk to your doctor about stop-smoking programs and medicines. These can increase your chances of quitting for good. When should you call for help? Call 911 anytime you think you may need emergency care. For example, call if:    · You have severe trouble breathing.    Call your doctor now or seek immediate medical care if:    · You have new or worse trouble breathing.     · You cough up dark brown or bloody mucus (sputum).     · You have a new or higher fever.     · You have a new rash.    Watch closely for changes in your health, and be sure to contact your doctor if:    · You cough more deeply or more often, especially if you notice more mucus or a change in the color of your mucus.     · You are not getting better as expected. Where can you learn more? Go to http://aren-mirella.info/. Enter H333 in the search box to learn more about \"Bronchitis: Care Instructions. \"  Current as of: December 6, 2017  Content Version: 11.7  © 6156-1026 [employer] [facility].  Care instructions adapted under license by [employer] (which disclaims liability or warranty for this information). If you have questions about a medical condition or this instruction, always ask your healthcare professional. [address] any warranty or liability for your use of this information.

## 2018-09-05 NOTE — ED NOTES
8:23 PM :Pt care assumed from Dr. Kalpana Hubbard , ED provider. Pt complaint(s), current treatment plan, progression and available diagnostic results have been discussed thoroughly. Rounding occurred: yes Intended Disposition: TBD Pending diagnostic reports and/or labs (please list): CT read Scribe Attestation Bonniecolumba Montes acting as a scribe for and in the presence of Bridgette Falcon MD     
September 04, 2018 at 8:24 PM 
    
Provider Attestation:     
I personally performed the services described in the documentation, reviewed the documentation, as recorded by the scribe in my presence, and it accurately and completely records my words and actions.  September 04, 2018 at 8:24 PM - Bridgette Falcon MD

## 2018-09-17 ENCOUNTER — CLINICAL SUPPORT (OUTPATIENT)
Dept: CARDIOLOGY CLINIC | Age: 68
End: 2018-09-17

## 2018-09-17 ENCOUNTER — OFFICE VISIT (OUTPATIENT)
Dept: CARDIOLOGY CLINIC | Age: 68
End: 2018-09-17

## 2018-09-17 VITALS
HEIGHT: 67 IN | OXYGEN SATURATION: 98 % | BODY MASS INDEX: 38.3 KG/M2 | WEIGHT: 244 LBS | DIASTOLIC BLOOD PRESSURE: 64 MMHG | HEART RATE: 69 BPM | SYSTOLIC BLOOD PRESSURE: 122 MMHG

## 2018-09-17 DIAGNOSIS — I42.8 NONISCHEMIC CARDIOMYOPATHY (HCC): Primary | Chronic | ICD-10-CM

## 2018-09-17 DIAGNOSIS — E78.5 DYSLIPIDEMIA: Chronic | ICD-10-CM

## 2018-09-17 DIAGNOSIS — I11.0 BENIGN HYPERTENSIVE HEART DISEASE WITH SYSTOLIC CHF, NYHA CLASS 2 (HCC): Chronic | ICD-10-CM

## 2018-09-17 DIAGNOSIS — I50.20 BENIGN HYPERTENSIVE HEART DISEASE WITH SYSTOLIC CHF, NYHA CLASS 2 (HCC): Chronic | ICD-10-CM

## 2018-09-17 DIAGNOSIS — I42.8 NONISCHEMIC CARDIOMYOPATHY (HCC): Chronic | ICD-10-CM

## 2018-09-17 DIAGNOSIS — Z95.0 CARDIAC PACEMAKER IN SITU: ICD-10-CM

## 2018-09-17 DIAGNOSIS — Z95.0 BIVENTRICULAR CARDIAC PACEMAKER IN SITU: ICD-10-CM

## 2018-09-17 DIAGNOSIS — I44.7 LEFT BUNDLE BRANCH BLOCK (LBBB) ON ELECTROCARDIOGRAM: Chronic | ICD-10-CM

## 2018-09-17 DIAGNOSIS — Z86.79 HISTORY OF COMPLETE HEART BLOCK: ICD-10-CM

## 2018-09-17 DIAGNOSIS — I50.22 CHRONIC SYSTOLIC HEART FAILURE (HCC): Primary | Chronic | ICD-10-CM

## 2018-09-17 NOTE — PROGRESS NOTES
HPI:    I saw Radha Lo in my office today in cardiovascular evaluation regarding her dilated nonischemic cardiomyopathy. Ms. Jose Manuel Lo is a very pleasant 78 year old fair skinned  female with history of a dilated cardiomyopathy with mild left ventricular dysfunction with an ejection fraction of 40-45% range with widely patent coronary arteries on a cardiac catheterization back in August of 1996. Her very last echocardiogram in June of 2015 showed that her ejection fraction was still very mildly decreased at 45-50%. She did have a biventricular AICD placed, but due to trauma and infection in that area that had to be removed and now she has a biventricular pacemaker with a pulse generator residing in her left upper quadrant, placed back in October of 2012.  
   
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 She comes in today and relates that she is doing fair. She has chronic lower extremity edema and some shortness of breath with exertion which of course is very limited in view of her paraplegia. She does sleep with her head up at night, but is not having any chest pain or significant shortness of breath issues though she does get short of breath with significant exertion. Encounter Diagnoses Name Primary?  Chronic systolic heart failure ([address] Yes  Nonischemic cardiomyopathy ([address]  Left bundle branch block (LBBB) on electrocardiogram   
 Biventricular cardiac pacemaker in situ  Benign hypertensive heart disease with systolic CHF, NYHA class 2 ([address]  Dyslipidemia Discussion:  This lady appears to be doing reasonably well but historically she does have some mild LV dysfunction and is having some edema issues so she certainly could have a component of heart failure although her examination does not otherwise suggest any signs of heart failure. Unfortunately we cannot weigh her daily so I had like to get a BMP and a BNP of has not been done recently to see if we can sort out whether she may have a component of heart failure. Her latest lipid profile which was completed on August 17, 2018 showed total cholesterol 127 with triglycerides 117, HDL 42, LDL of 61.6, and VLDL of 23.4 which I think is very good control on just Pravachol 20 mg daily. Her hemoglobin A1c at the same time on August 17, 2018 showed a level of 10.2 suggesting her diabetes is suboptimally controlled and she is working with Dr. Belen Maxwell to get this better controlled moving forward. We did check her biventricular [employer] pacemaker today and she has 3 months remaining on her battery so we will plan to have her come back in a month for another pacemaker check. She has remained in sinus rhythm without any atrial fibrillation or ventricular high rates. Her blood pressure is well-controlled today and she otherwise seems to be doing reasonably well so I am simply get a plan to see her again in several months from a cardiac vantage though she will undoubtedly get a pacemaker before that time. PCP: Scarlet Salazar MD  
 
  
Past Medical History:  
Diagnosis Date  Acute paraplegia ([facility] 4/20/2017  Benign hypertensive heart disease with systolic CHF, NYHA class 2 ([address] 9/5/2012  Biventricular implantable cardioverter-defibrillator in situ 04/28/2005 Upgraded to BiV AICD; gen change 4/2008; pocket revision 10/2009; Abdominal - done on 8/22/2012 by Dr. Doris Joshi  Cardiac cath 08/15/1996 Patent coronaries. Elev LVEDP. EF 50-55%.  Cardiac echocardiogram 06/23/2015 Ltd study. EF 45-50%. Mild, diffuse hypk. Severe apical hypk. No mass or thrombus was clearly identified, although imaging was suboptimal.    
 Cardiac nuclear imaging test 06/19/2015 Fixed distal apical, distal septal defect more likely due to RV pacing than prior infarct. No ischemia. EF 46%. RWMA c/w RV pacing. Nondiagnostic EKG on pharm stress test.    
 Cardiovascular lower extremity venous duplex 09/04/2012 Acute, non-occlusive DVT in CFV on right. No DVT on left. No superficial thrombosis bilaterally.  Cardiovascular upper extremity venous duplex 08/27/2012 DVT in axillary vein on left. Left subclavian was not visualized.  Chronic anemia 9/5/2012  Chronic systolic heart failure ([address]  Decreased calculated glomerular filtration rate (GFR) 3/30/2017 Calculated GFR equivalent to that of CKD stage 3 = 30-59 ml/min  Diabetic neuropathy associated with type 2 diabetes mellitus ([address] 6/28/2011  Difficult airway for intubation 08/22/2012  
 see anesthesia airway note  Dyslipidemia 6/28/2011  Gout  History of complete heart block 6/28/2011  History of Coumadin therapy Anticoagulation for DVT of the LUE; Discontinued on 3/30/2017  History of deep venous thrombosis 9/5/2012 Left upper extremity  History of pyelonephritis 3/30/2017  Left bundle branch block (LBBB) on electrocardiogram 6/28/2011  Nonischemic cardiomyopathy ([address] 6/28/2011  Obesity (BMI 35.0-39.9 without comorbidity) 3/13/2017  Obstructive sleep apnea on CPAP 2/7/2012  Psoas abscess, right ([address] 4/20/2017  Psoas hematoma, right, secondary to anticoagulant therapy 3/30/2017  Type 2 diabetes mellitus with diabetic neuropathy ([address] 6/28/2011 Past Surgical History:  
Procedure Laterality Date  HX CARPAL TUNNEL RELEASE  4/07 right 5 [address]  HX OTHER SURGICAL  6/11/2012 AICD revision  HX PACEMAKER  4/28/2005 Med[employer] AICD Current Outpatient Rx Name  Route  Sig  Dispense  Refill  pravastatin (PRAVACHOL) 40 mg tablet Oral 
  Take 40 mg by mouth nightly.  cyclobenzaprine (FLEXERIL) 10 mg tablet Oral 
  Take 10 mg by mouth as needed.  warfarin (COUMADIN) 2 mg tablet Take 3 tablets by mouth daily or as directed by our office. 90 Tab 
  6 
  
 OTHER Oral 
  Take 2 Tabs by mouth two (2) times a day. NeuRx-TF - total formulation for peripheral nerve health  bumetanide (BUMEX) 1 mg tablet Oral 
  Take 1 Tab by mouth two (2) times a day. 180 Tab 
  3  potassium chloride (KLOR-CON M20) 20 mEq tablet Oral 
  Take 2 Tabs by mouth two (2) times a day. Or as directed 360 Tab 
  3 
  
 gabapentin (NEURONTIN) 300 mg tablet Oral 
  Take 300 mg by mouth two (2) times daily as needed.  carvedilol (COREG) 25 mg tablet Oral 
  Take 25 mg by mouth two (2) times a day.  insulin glargine (LANTUS) 100 unit/mL injection SubCUTAneous 20 Units by SubCUTAneous route two (2) times a day.  allopurinol (ZYLOPRIM) 100 mg tablet Oral 
  Take 100 mg by mouth two (2) times a day.  sitaGLIPtin (JANUVIA) 50 mg tablet Oral 
  Take 1 Tab by mouth daily. 15 Tab 
  0 
  
 ferrous sulfate 325 mg (65 mg iron) tablet Oral 
  Take 1 Tab by mouth two (2) times daily (with meals). 30 Tab 
  0 
  
 insulin aspart (NOVOLOG) 100 unit/mL injection INITIATE INSULIN CORRECTIVE PROTOCOL (HR): Normal Insulin Sensitivity  For Blood Sugar (mg/dL) of:    Less than 150 =   0 units          150 -199 =   2 units 200 -249 =   4 units 250 -299 =   6 units 300 -349 =   8 units 350 and above =   10 units If 2 glucose readings are above 200 mg/dL 10 mL 
  6  Cholecalciferol, Vitamin D3, (VITAMIN D) 5,000 unit Tab Oral 
  Take 1 Tab by mouth daily. Allergies Allergen Reactions  Vancomycin Itching  Ampicillin Itching  Bactrim [Sulfamethoxazole-Trimethoprim] Unknown (comments)  Blueberry Swelling Causes throat swelling  Ciprofloxacin Itching  Codeine Other (comments) Jumpy feeling  Crestor [Rosuvastatin] Itching  Darvocet A500 [Propoxyphene N-Acetaminophen] Itching  Demerol [Meperidine] Itching  Levaquin [Levofloxacin] Itching  Lipitor [Atorvastatin] Myalgia  Magnesium Oxide Itching  
  nausea  Minocin [Minocycline] Unknown (comments)  Pcn [Penicillins] Itching  Pravachol [Pravastatin] Swelling Swelling in mouth. Not allergic per patient, takes at home  Shellfish Derived Unknown (comments)  Sulfa (Sulfonamide Antibiotics) Itching  Ultracet [Tramadol-Acetaminophen] Itching  Vicodin [Hydrocodone-Acetaminophen] Unknown (comments)  Vytorin 10-10 [Ezetimibe-Simvastatin] Myalgia  Percodan [Oxycodone Hcl-Oxycodone-Asa] Itching Social  
Social History Substance Use Topics  Smoking status: Never Smoker  Smokeless tobacco: Never Used  Alcohol use No  
 
   
Family history: family history includes Cancer in her father. Review of Systems:    
Constitutional: Negative. Respiratory: Positive for cough. Negative for hemoptysis, shortness of breath and wheezing. Cardiovascular: Positive for orthopnea and leg swelling. Negative for chest pain and palpitations. Gastrointestinal: Negative. Musculoskeletal: Positive for joint pain and myalgias. Negative for falls. Neurological: Negative for dizziness. Physical Exam:  
The patient is an alert, oriented, well developed, well nourished 76 y.o.  female who was in no acute distress at the time of my examination. Visit Vitals  /64  Pulse 69  Ht 5' 7\" (1.702 m)  Wt 110.7 kg (244 lb)  SpO2 98%  BMI 38.22 kg/m2 BP Readings from Last 3 Encounters:  
09/17/18 122/64  
09/04/18 138/80  
08/24/18 148/77 Wt Readings from Last 3 Encounters:  
09/17/18 110.7 kg (244 lb) 09/04/18 111.1 kg (245 lb)  
08/24/18 120.2 kg (265 lb) HEENT: Conjunctivae pink, sclera clear and white. Neck: Supple without masses or tenderness. There is mild thyromegaly. No jugular venous distention. Carotid upstrokes are full bilaterally, without bruits. Cardiovascular: Chest is symmetrical with good excursion. There  keloid over the incision in left upper chest in former pacemaker site. [place] is displaced between the MCL and AAL. No lifts, heaves, or thrills felt on palpation. Normal S1 and S2 with a paradoxical splitting of the S2, with a grade II/VI DEE along the left sternal border, with radiation to the base without diastolic murmur. Lungs: Clear to auscultation in all fields. Abdomen: Protuberant and soft non tender. It was not adequately evaluated since the patient was in a wheelchair at the time of evaluation. Extremities: No edema with palpible peripheral pulses. Neurologic exam: was not completed but the patient is a paraplegic. Review of Data: Please refer to past medical history for most recent cardiac testing. Results for orders placed or performed in visit on 07/19/18 AMB POC EKG ROUTINE W/ 12 LEADS, INTER & REP     Status: None Narrative Read by Jonel Morgan, DO - AV sequentially paced rhythm with one PVC and some sinus beats with atrial sensing and ventricular pacing. Jonel Morgan D.O., F.A.C.C. [facility] [address] [place] [address] Vik[place] Melchor [phone] PLEASE NOTE:  This document has been produced using voice recognition software. Unrecognized errors in transcription may be present.

## 2018-09-17 NOTE — PROGRESS NOTES
Review of Systems Constitutional: Negative. Respiratory: Positive for cough. Negative for hemoptysis, shortness of breath and wheezing. Cardiovascular: Positive for orthopnea and leg swelling. Negative for chest pain and palpitations. Gastrointestinal: Negative. Musculoskeletal: Positive for joint pain and myalgias. Negative for falls. Neurological: Negative for dizziness.

## 2018-09-17 NOTE — PROGRESS NOTES
I have personally seen and evaluated the device findings. Interrogation reviewed and I agree with assessment. Jose Cristina

## 2018-09-17 NOTE — MR AVS SNAPSHOT
[address] [place] [address] Halina [place] [address] 
[phone] Patient: Kely Richards MRN: [id number] [id number] Visit Information Date & Time Provider Department Dept. Phone Encounter #  
 9/17/2018 12:40 PM Tamir Dobbs, [address] [facility] [id number] Upcoming Health Maintenance Date Due Hepatitis C Screening 1950 FOOT EXAM Q1 7/27/1960 MICROALBUMIN Q1 7/27/1960 EYE EXAM RETINAL OR DILATED Q1 7/27/1960 DTaP/Tdap/Td series (1 - Tdap) 7/27/1971 ZOSTER VACCINE AGE 60> 5/27/2010 BREAST CANCER SCRN MAMMOGRAM 1/19/2014 GLAUCOMA SCREENING Q2Y 7/27/2015 Bone Densitometry (Dexa) Screening 7/27/2015 Pneumococcal 65+ Low/Medium Risk (1 of 2 - PCV13) 7/27/2015 MEDICARE YEARLY EXAM 3/28/2018 FOBT Q 1 YEAR AGE 50-75 6/15/2018 Influenza Age 5 to Adult 8/1/2018 HEMOGLOBIN A1C Q6M 2/17/2019 LIPID PANEL Q1 8/17/2019 Allergies as of 9/17/2018  Review Complete On: 9/17/2018 By: Tamir Dobbs DO Severity Noted Reaction Type Reactions Vancomycin High 08/17/2012   Side Effect Itching Ampicillin  06/11/2010    Itching Bactrim [Sulfamethoxazole-trimethoprim]  06/11/2010    Unknown (comments) Blueberry  03/31/2017    Swelling Causes throat swelling Ciprofloxacin  06/11/2010    Itching Codeine  06/11/2010    Other (comments) Jumpy feeling Crestor [Rosuvastatin]  06/11/2010    Itching Darvocet A500 [Propoxyphene N-acetaminophen]  06/11/2010    Itching Demerol [Meperidine]  06/11/2010    Itching Levaquin [Levofloxacin]  05/20/2014    Itching Lipitor [Atorvastatin]  06/11/2010    Myalgia Magnesium Oxide  06/28/2013    Itching  
 nausea Minocin [Minocycline]  06/11/2010    Unknown (comments) Pcn [Penicillins]  06/11/2010    Itching Pravachol [Pravastatin]  06/11/2010    Swelling Swelling in mouth. Not allergic per patient, takes at home Shellfish Derived  06/07/2017    Unknown (comments) Sulfa (Sulfonamide Antibiotics)  06/11/2010    Itching Ultracet [Tramadol-acetaminophen]  06/11/2010    Itching Vicodin [Hydrocodone-acetaminophen]  06/11/2010    Unknown (comments) Vytorin 10-10 [Ezetimibe-simvastatin]  06/11/2010    Myalgia Percodan [Oxycodone Hcl-oxycodone-asa] Low 06/11/2010   Side Effect Itching Current Immunizations  Reviewed on 5/2/2017 No immunizations on file. Not reviewed this visit You Were Diagnosed With   
  
 Codes Comments Chronic systolic heart failure (HCC)    -  Primary ICD-10-CM: E86.97 ICD-9-CM: 428.22 Nonischemic cardiomyopathy ([facility]     ICD-10-CM: I42.8 ICD-9-CM: 425.4 Left bundle branch block (LBBB) on electrocardiogram     ICD-10-CM: I44.7 ICD-9-CM: 426.3 Biventricular cardiac pacemaker in situ     ICD-10-CM: Z95.0 ICD-9-CM: V45.01 Benign hypertensive heart disease with systolic CHF, NYHA class 2 (HCC)     ICD-10-CM: I11.0, I50.20 ICD-9-CM: 402.11, 428.20, 428.0 Dyslipidemia     ICD-10-CM: E78.5 ICD-9-CM: 272.4 Vitals BP Pulse Height(growth percentile) Weight(growth percentile) SpO2 BMI  
 122/64 69 5' 7\" (1.702 m) 244 lb (110.7 kg) 98% 38.22 kg/m2 OB Status Smoking Status Hysterectomy Never Smoker Vitals History BMI and BSA Data Body Mass Index Body Surface Area  
 38.22 kg/m 2 2.29 m 2 Preferred Pharmacy Pharmacy Name Phone [address], [address] [place] [address] [phone] Your Updated Medication List  
  
   
This list is accurate as of 9/17/18  1:33 PM.  Always use your most recent med list.  
  
  
  
  
 carvedilol 6.25 mg tablet Commonly known as:  COREG  
  
 cholecalciferol (VITAMIN D3) 5,000 unit Tab tablet Commonly known as:  VITAMIN D3 Take  by mouth daily. famotidine 20 mg tablet Commonly known as:  PEPCID Take 1 Tab by mouth nightly. ferrous sulfate 325 mg (65 mg iron) tablet Take 1 Tab by mouth two (2) times daily (with meals). folic acid 1 mg tablet Commonly known as:  Google Take 1 Tab by mouth daily. furosemide 40 mg tablet Commonly known as:  LASIX  
1 tab daily  Indications: Edema  
  
 gabapentin 300 mg capsule Commonly known as:  NEURONTIN Take 2 Caps by mouth two (2) times a day. Indications: NEUROPATHIC PAIN  
  
 insulin glargine 100 unit/mL injection Commonly known as:  LANTUS U-100 INSULIN  
5 Units by SubCUTAneous route nightly. Indications: type 2 diabetes mellitus  
  
 insulin lispro 100 unit/mL injection Commonly known as:  HUMALOG See sliding scale JANUVIA 50 mg tablet Generic drug:  SITagliptin Take 50 mg by mouth daily. Lactobacillus Acidoph & Bulgar 1 million cell Tab tablet Commonly known as:  Jackqueline Gull Take 1 Tab by mouth two (2) times a day. potassium chloride 20 mEq tablet Commonly known as:  K-DUR, KLOR-CON Take 1 Tab by mouth two (2) times a day. pravastatin 40 mg tablet Commonly known as:  PRAVACHOL Take 40 mg by mouth nightly. Indications: DYSLIPIDEMIA We Performed the Following AMB POC EKG ROUTINE W/ 12 LEADS, INTER & REP [94109 CPT(R)] To-Do List   
 09/17/2018 Lab:  NT-PRO BNP Introducing [facility] HE[facility]! [facility] introduces [employer] patient portal. Now you can access parts of your medical record, email your doctor's office, and request medication refills online. 1. In your internet browser, go to [email or web address] 2. Click on the First Time User? Click Here link in the Sign In box. You will see the New Member Sign Up page. 3. Enter your [employer] Access Code exactly as it appears below. You will not need to use this code after youve completed the sign-up process.  If you do not sign up before the expiration date, you must request a new code. · [employer] Access Code: [email or web address] Expires: 12/16/2018 12:34 PM 
 
4. Enter the last four digits of your Social Security Number (xxxx) and Date of Birth (mm/dd/yyyy) as indicated and click Submit. You will be taken to the next sign-up page. 5. Create a [employer] ID. This will be your [employer] login ID and cannot be changed, so think of one that is secure and easy to remember. 6. Create a [employer] password. You can change your password at any time. 7. Enter your Password Reset Question and Answer. This can be used at a later time if you forget your password. 8. Enter your e-mail address. You will receive e-mail notification when new information is available in [address] 9. Click Sign Up. You can now view and download portions of your medical record. 10. Click the Download Summary menu link to download a portable copy of your medical information. If you have questions, please visit the Frequently Asked Questions section of the [employer] website. Remember, [employer] is NOT to be used for urgent needs. For medical emergencies, dial 911. Now available from your iPhone and Android! Please provide this summary of care documentation to your next provider. Your primary care clinician is listed as [address] [place] [address]. If you have any questions after today's visit, please call [phone].

## 2018-10-03 ENCOUNTER — HOSPITAL ENCOUNTER (OUTPATIENT)
Dept: LAB | Age: 68
Discharge: HOME | End: 2018-10-03
Payer: COMMERCIAL

## 2018-10-03 DIAGNOSIS — I50.22 CHRONIC SYSTOLIC HEART FAILURE (HCC): Chronic | ICD-10-CM

## 2018-10-03 LAB — BNP SERPL-MCNC: 2119 PG/ML (ref 0–900)

## 2018-10-03 PROCEDURE — 36415 COLL VENOUS BLD VENIPUNCTURE: CPT | Performed by: INTERNAL MEDICINE

## 2018-10-03 PROCEDURE — 83880 ASSAY OF NATRIURETIC PEPTIDE: CPT | Performed by: INTERNAL MEDICINE

## 2018-10-04 NOTE — PROGRESS NOTES
This lady appears to be doing reasonably well but historically she does have some mild LV dysfunction and is having some edema issues so she certainly could have a component of heart failure although her examination does not otherwise suggest any signs of heart failure. Unfortunately we cannot weigh her daily so I had like to get a BMP and a BNP of has not been done recently to see if we can sort out whether she may have a component of heart failure.

## 2018-10-17 ENCOUNTER — APPOINTMENT (OUTPATIENT)
Dept: GENERAL RADIOLOGY | Age: 68
DRG: 629 | End: 2018-10-17
Attending: PHYSICIAN ASSISTANT
Payer: COMMERCIAL

## 2018-10-17 ENCOUNTER — HOSPITAL ENCOUNTER (INPATIENT)
Age: 68
LOS: 6 days | Discharge: HOME HEALTH | DRG: 629 | End: 2018-10-23
Attending: EMERGENCY MEDICINE | Admitting: INTERNAL MEDICINE
Payer: COMMERCIAL

## 2018-10-17 DIAGNOSIS — M86.171 OTHER ACUTE OSTEOMYELITIS OF RIGHT FOOT (HCC): Primary | ICD-10-CM

## 2018-10-17 PROBLEM — L97.409 HEEL ULCER (HCC): Status: ACTIVE | Noted: 2018-10-17

## 2018-10-17 LAB
ALBUMIN SERPL-MCNC: 3 G/DL (ref 3.4–5)
ALBUMIN/GLOB SERPL: 0.6 {RATIO} (ref 0.8–1.7)
ALP SERPL-CCNC: 120 U/L (ref 45–117)
ALT SERPL-CCNC: 27 U/L (ref 13–56)
AMORPH CRY URNS QL MICRO: ABNORMAL
ANION GAP SERPL CALC-SCNC: 8 MMOL/L (ref 3–18)
APPEARANCE UR: ABNORMAL
APTT PPP: 21.9 SEC (ref 23–36.4)
AST SERPL-CCNC: 25 U/L (ref 15–37)
BACTERIA SPEC CULT: ABNORMAL
BACTERIA SPEC CULT: ABNORMAL
BACTERIA URNS QL MICRO: ABNORMAL /HPF
BASOPHILS # BLD: 0 K/UL (ref 0–0.1)
BASOPHILS NFR BLD: 0 % (ref 0–2)
BILIRUB SERPL-MCNC: 0.6 MG/DL (ref 0.2–1)
BILIRUB UR QL: NEGATIVE
BUN SERPL-MCNC: 24 MG/DL (ref 7–18)
BUN/CREAT SERPL: 17 (ref 12–20)
CALCIUM SERPL-MCNC: 9 MG/DL (ref 8.5–10.1)
CHLORIDE SERPL-SCNC: 104 MMOL/L (ref 100–108)
CO2 SERPL-SCNC: 24 MMOL/L (ref 21–32)
COLOR UR: YELLOW
CREAT SERPL-MCNC: 1.39 MG/DL (ref 0.6–1.3)
DIFFERENTIAL METHOD BLD: ABNORMAL
EOSINOPHIL # BLD: 0.2 K/UL (ref 0–0.4)
EOSINOPHIL NFR BLD: 2 % (ref 0–5)
ERYTHROCYTE [DISTWIDTH] IN BLOOD BY AUTOMATED COUNT: 14.3 % (ref 11.6–14.5)
GLOBULIN SER CALC-MCNC: 5.4 G/DL (ref 2–4)
GLUCOSE BLD STRIP.AUTO-MCNC: 162 MG/DL (ref 70–110)
GLUCOSE BLD STRIP.AUTO-MCNC: 226 MG/DL (ref 70–110)
GLUCOSE SERPL-MCNC: 140 MG/DL (ref 74–99)
GLUCOSE UR STRIP.AUTO-MCNC: NEGATIVE MG/DL
HCT VFR BLD AUTO: 34.2 % (ref 35–45)
HGB BLD-MCNC: 11.7 G/DL (ref 12–16)
HGB UR QL STRIP: ABNORMAL
HYALINE CASTS URNS QL MICRO: ABNORMAL /LPF (ref 0–2)
INR PPP: 1 (ref 0.8–1.2)
KETONES UR QL STRIP.AUTO: NEGATIVE MG/DL
LACTATE BLD-SCNC: 1 MMOL/L (ref 0.4–2)
LEUKOCYTE ESTERASE UR QL STRIP.AUTO: ABNORMAL
LYMPHOCYTES # BLD: 2.1 K/UL (ref 0.9–3.6)
LYMPHOCYTES NFR BLD: 27 % (ref 21–52)
MCH RBC QN AUTO: 28.1 PG (ref 24–34)
MCHC RBC AUTO-ENTMCNC: 34.2 G/DL (ref 31–37)
MCV RBC AUTO: 82 FL (ref 74–97)
MONOCYTES # BLD: 0.4 K/UL (ref 0.05–1.2)
MONOCYTES NFR BLD: 6 % (ref 3–10)
NEUTS SEG # BLD: 4.9 K/UL (ref 1.8–8)
NEUTS SEG NFR BLD: 65 % (ref 40–73)
NITRITE UR QL STRIP.AUTO: POSITIVE
PH UR STRIP: 5 [PH] (ref 5–8)
PLATELET # BLD AUTO: 241 K/UL (ref 135–420)
PMV BLD AUTO: 9.1 FL (ref 9.2–11.8)
POTASSIUM SERPL-SCNC: 4 MMOL/L (ref 3.5–5.5)
PROT SERPL-MCNC: 8.4 G/DL (ref 6.4–8.2)
PROT UR STRIP-MCNC: 100 MG/DL
PROTHROMBIN TIME: 12.9 SEC (ref 11.5–15.2)
RBC # BLD AUTO: 4.17 M/UL (ref 4.2–5.3)
RBC #/AREA URNS HPF: ABNORMAL /HPF (ref 0–5)
SERVICE CMNT-IMP: ABNORMAL
SODIUM SERPL-SCNC: 136 MMOL/L (ref 136–145)
SP GR UR REFRACTOMETRY: 1.01 (ref 1–1.03)
UROBILINOGEN UR QL STRIP.AUTO: 0.2 EU/DL (ref 0.2–1)
WBC # BLD AUTO: 7.5 K/UL (ref 4.6–13.2)
WBC URNS QL MICRO: ABNORMAL /HPF (ref 0–4)

## 2018-10-17 PROCEDURE — 77030010545

## 2018-10-17 PROCEDURE — 87086 URINE CULTURE/COLONY COUNT: CPT | Performed by: HOSPITALIST

## 2018-10-17 PROCEDURE — 74011636637 HC RX REV CODE- 636/637: Performed by: INTERNAL MEDICINE

## 2018-10-17 PROCEDURE — 81001 URINALYSIS AUTO W/SCOPE: CPT | Performed by: PHYSICIAN ASSISTANT

## 2018-10-17 PROCEDURE — 82962 GLUCOSE BLOOD TEST: CPT

## 2018-10-17 PROCEDURE — 80053 COMPREHEN METABOLIC PANEL: CPT | Performed by: PHYSICIAN ASSISTANT

## 2018-10-17 PROCEDURE — 85025 COMPLETE CBC W/AUTO DIFF WBC: CPT | Performed by: PHYSICIAN ASSISTANT

## 2018-10-17 PROCEDURE — 87070 CULTURE OTHR SPECIMN AEROBIC: CPT | Performed by: INTERNAL MEDICINE

## 2018-10-17 PROCEDURE — 74011250637 HC RX REV CODE- 250/637: Performed by: INTERNAL MEDICINE

## 2018-10-17 PROCEDURE — 73630 X-RAY EXAM OF FOOT: CPT

## 2018-10-17 PROCEDURE — 74011250636 HC RX REV CODE- 250/636: Performed by: INTERNAL MEDICINE

## 2018-10-17 PROCEDURE — 99285 EMERGENCY DEPT VISIT HI MDM: CPT

## 2018-10-17 PROCEDURE — 87641 MR-STAPH DNA AMP PROBE: CPT | Performed by: INTERNAL MEDICINE

## 2018-10-17 PROCEDURE — 87077 CULTURE AEROBIC IDENTIFY: CPT | Performed by: INTERNAL MEDICINE

## 2018-10-17 PROCEDURE — 65270000029 HC RM PRIVATE

## 2018-10-17 PROCEDURE — 87186 SC STD MICRODIL/AGAR DIL: CPT | Performed by: INTERNAL MEDICINE

## 2018-10-17 PROCEDURE — 85730 THROMBOPLASTIN TIME PARTIAL: CPT | Performed by: EMERGENCY MEDICINE

## 2018-10-17 PROCEDURE — 87040 BLOOD CULTURE FOR BACTERIA: CPT | Performed by: EMERGENCY MEDICINE

## 2018-10-17 PROCEDURE — 87077 CULTURE AEROBIC IDENTIFY: CPT | Performed by: HOSPITALIST

## 2018-10-17 PROCEDURE — 77030037878 HC DRSG MEPILEX >48IN BORD MOLN -B

## 2018-10-17 PROCEDURE — 85610 PROTHROMBIN TIME: CPT | Performed by: EMERGENCY MEDICINE

## 2018-10-17 PROCEDURE — 87186 SC STD MICRODIL/AGAR DIL: CPT | Performed by: HOSPITALIST

## 2018-10-17 PROCEDURE — 83605 ASSAY OF LACTIC ACID: CPT

## 2018-10-17 RX ORDER — UREA 10 %
1 LOTION (ML) TOPICAL 2 TIMES DAILY
Status: DISCONTINUED | OUTPATIENT
Start: 2018-10-17 | End: 2018-10-23 | Stop reason: HOSPADM

## 2018-10-17 RX ORDER — GABAPENTIN 300 MG/1
600 CAPSULE ORAL 2 TIMES DAILY
Status: DISCONTINUED | OUTPATIENT
Start: 2018-10-17 | End: 2018-10-23 | Stop reason: HOSPADM

## 2018-10-17 RX ORDER — CARVEDILOL 6.25 MG/1
6.25 TABLET ORAL 2 TIMES DAILY WITH MEALS
Status: DISCONTINUED | OUTPATIENT
Start: 2018-10-18 | End: 2018-10-23 | Stop reason: HOSPADM

## 2018-10-17 RX ORDER — MAGNESIUM SULFATE 100 %
16 CRYSTALS MISCELLANEOUS AS NEEDED
Status: DISCONTINUED | OUTPATIENT
Start: 2018-10-17 | End: 2018-10-19 | Stop reason: SDUPTHER

## 2018-10-17 RX ORDER — DEXTROSE 50 % IN WATER (D50W) INTRAVENOUS SYRINGE
25-50 AS NEEDED
Status: DISCONTINUED | OUTPATIENT
Start: 2018-10-17 | End: 2018-10-19 | Stop reason: SDUPTHER

## 2018-10-17 RX ORDER — INSULIN LISPRO 100 [IU]/ML
INJECTION, SOLUTION INTRAVENOUS; SUBCUTANEOUS
Status: DISCONTINUED | OUTPATIENT
Start: 2018-10-17 | End: 2018-10-23 | Stop reason: HOSPADM

## 2018-10-17 RX ORDER — ONDANSETRON 2 MG/ML
4 INJECTION INTRAMUSCULAR; INTRAVENOUS
Status: DISCONTINUED | OUTPATIENT
Start: 2018-10-17 | End: 2018-10-22

## 2018-10-17 RX ORDER — FAMOTIDINE 20 MG/1
20 TABLET, FILM COATED ORAL
Status: DISCONTINUED | OUTPATIENT
Start: 2018-10-17 | End: 2018-10-23 | Stop reason: HOSPADM

## 2018-10-17 RX ORDER — LANOLIN ALCOHOL/MO/W.PET/CERES
325 CREAM (GRAM) TOPICAL 2 TIMES DAILY WITH MEALS
Status: DISCONTINUED | OUTPATIENT
Start: 2018-10-18 | End: 2018-10-23 | Stop reason: HOSPADM

## 2018-10-17 RX ORDER — PRAVASTATIN SODIUM 20 MG/1
40 TABLET ORAL
Status: DISCONTINUED | OUTPATIENT
Start: 2018-10-17 | End: 2018-10-23 | Stop reason: HOSPADM

## 2018-10-17 RX ORDER — INSULIN GLARGINE 100 [IU]/ML
5 INJECTION, SOLUTION SUBCUTANEOUS
Status: DISCONTINUED | OUTPATIENT
Start: 2018-10-17 | End: 2018-10-23 | Stop reason: HOSPADM

## 2018-10-17 RX ORDER — HEPARIN SODIUM 5000 [USP'U]/ML
5000 INJECTION, SOLUTION INTRAVENOUS; SUBCUTANEOUS EVERY 8 HOURS
Status: DISCONTINUED | OUTPATIENT
Start: 2018-10-17 | End: 2018-10-23 | Stop reason: HOSPADM

## 2018-10-17 RX ORDER — FOLIC ACID 1 MG/1
1 TABLET ORAL DAILY
Status: DISCONTINUED | OUTPATIENT
Start: 2018-10-18 | End: 2018-10-23 | Stop reason: HOSPADM

## 2018-10-17 RX ADMIN — INSULIN GLARGINE 5 UNITS: 100 INJECTION, SOLUTION SUBCUTANEOUS at 22:00

## 2018-10-17 RX ADMIN — LACTOBACILLUS TAB 1 TABLET: TAB at 21:59

## 2018-10-17 RX ADMIN — GABAPENTIN 600 MG: 300 CAPSULE ORAL at 21:59

## 2018-10-17 RX ADMIN — FAMOTIDINE 20 MG: 20 TABLET ORAL at 21:59

## 2018-10-17 RX ADMIN — HEPARIN SODIUM 5000 UNITS: 5000 INJECTION, SOLUTION INTRAVENOUS; SUBCUTANEOUS at 22:00

## 2018-10-17 RX ADMIN — INSULIN LISPRO 4 UNITS: 100 INJECTION, SOLUTION INTRAVENOUS; SUBCUTANEOUS at 22:00

## 2018-10-17 RX ADMIN — PRAVASTATIN SODIUM 40 MG: 20 TABLET ORAL at 21:59

## 2018-10-17 NOTE — ROUTINE PROCESS
Bedside and Verbal shift change report given to Ela Elmore (oncoming nurse) by Karime Clark RN (offgoing nurse). Report included the following information SBAR, Kardex, MAR and Recent Results. SITUATION:  
? Code Status: Full Code 
? Reason for Admission: Heel ulcer ([facility] [facility] ? Hospital day: 0 
? Problem List:  
   
Hospital Problems  Date Reviewed: 9/17/2018 Codes Class Noted POA Heel ulcer ([facility] ICD-10-CM: L97.409 ICD-9-CM: 707.14  10/17/2018 Unknown BACKGROUND:  
 Past Medical History:  
Past Medical History:  
Diagnosis Date  Acute paraplegia ([facility] 4/20/2017  Benign hypertensive heart disease with systolic CHF, NYHA class 2 ([facility] [facility] 9/5/2012  Biventricular implantable cardioverter-defibrillator in situ 04/28/2005 Upgraded to BiV AICD; gen change 4/2008; pocket revision 10/2009; Abdominal - done on 8/22/2012 by Dr. Lavelle White  Cardiac cath 08/15/1996 Patent coronaries. Elev LVEDP. EF 50-55%.  Cardiac echocardiogram 06/23/2015 Ltd study. EF 45-50%. Mild, diffuse hypk. Severe apical hypk. No mass or thrombus was clearly identified, although imaging was suboptimal.    
 Cardiac nuclear imaging test 06/19/2015 Fixed distal apical, distal septal defect more likely due to RV pacing than prior infarct. No ischemia. EF 46%. RWMA c/w RV pacing. Nondiagnostic EKG on pharm stress test.    
 Cardiovascular lower extremity venous duplex 09/04/2012 Acute, non-occlusive DVT in CFV on right. No DVT on left. No superficial thrombosis bilaterally.  Cardiovascular upper extremity venous duplex 08/27/2012 DVT in axillary vein on left. Left subclavian was not visualized.  Chronic anemia 9/5/2012  Chronic systolic heart failure ([facility] [facility]  Decreased calculated glomerular filtration rate (GFR) 3/30/2017 Calculated GFR equivalent to that of CKD stage 3 = 30-59 ml/min  Diabetic neuropathy associated with type 2 diabetes mellitus ([facility] [address] 6/28/2011  Difficult airway for intubation 08/22/2012  
 see anesthesia airway note  Dyslipidemia 6/28/2011  Gout  History of complete heart block 6/28/2011  History of Coumadin therapy Anticoagulation for DVT of the LUE; Discontinued on 3/30/2017  History of deep venous thrombosis 9/5/2012 Left upper extremity  History of pyelonephritis 3/30/2017  Left bundle branch block (LBBB) on electrocardiogram 6/28/2011  Nonischemic cardiomyopathy ([facility] [address] 6/28/2011  Obesity (BMI 35.0-39.9 without comorbidity) 3/13/2017  Obstructive sleep apnea on CPAP 2/7/2012  Psoas abscess, right ([facility] [address] 4/20/2017  Psoas hematoma, right, secondary to anticoagulant therapy 3/30/2017  Type 2 diabetes mellitus with diabetic neuropathy ([facility] [address] 6/28/2011 Patient taking anticoagulants no ASSESSMENT:  
? Changes in Assessment Throughout Shift: no 
 
? Patient has Central Line: no Reasons if yes: n/a 
? Patient has Perez Cath: yes Reasons if yes: chronic perez for retention ? Last Vitals: 
  
Vitals:  
 10/17/18 1138 10/17/18 1230 10/17/18 1706 BP: 154/81  120/73 Pulse: 75  73 Resp: 18  17 Temp: 98.3 °F (36.8 °C)  97.2 °F (36.2 °C) SpO2: 99% 99% 100% Weight: 111.6 kg (246 lb) Height: 5' 7\" (1.702 m) ? IV and DRAINS (will only show if present) Peripheral IV 10/17/18 Left Arm-Site Assessment: Clean, dry, & intact ? WOUND (if present) Wound Type:  none Dressing present Wound Concerns/Notes:  none ? PAIN Pain Assessment Pain Intensity 1: 8 (10/17/18 0525) Pain Location 1: Leg 
    
  Patient Stated Pain Goal: 7 
o Interventions for Pain:  none 
o Intervention effective: n/a 
o Time of last intervention: none 
o Reassessment Completed: no  
 
? Last 3 Weights: 
Last 3 Recorded Weights in this Encounter 10/17/18 1138 Weight: 111.6 kg (246 lb) Weight change: ? INTAKE/OUPUT Current Shift: No intake/output data recorded. Last three shifts: 10/16 0701 - 10/17 1900 In: -  
Out: 1500 [Urine:1500] ? LAB RESULTS Recent Labs 10/17/18 
1211 WBC 7.5 HGB 11.7* HCT 34.2*  
 Recent Labs 10/17/18 1410 10/17/18 
1211 NA  --  136 K  --  4.0  
GLU  --  140* BUN  --  24* CREA  --  1.39* CA  --  9.0 INR 1.0  --   
 
 
RECOMMENDATIONS AND DISCHARGE PLANNING 1. Pending tests/procedures/ Plan of Care or Other Needs: wound care 2. Discharge plan for patient and Needs/Barriers: Home 3. Estimated Discharge Date: TBD Posted on Whiteboard in Patients Room: no   
 
4. The patient's care plan was reviewed with the oncoming nurse. \"HEALS\" SAFETY CHECK Fall Risk Total Score: 2 Safety Measures: A safety check occurred in the patient's room between off going nurse and oncoming nurse listed above. The safety check included the below items Area Items H High Alert Medications ? Verify all high alert medication drips (heparin, PCA, etc.) E Equipment ? Suction is set up for ALL patients (with vania) ? Red plugs utilized for all equipment (IV pumps, etc.) ? WOWs wiped down at end of shift. ? Room stocked with oxygen, suction, and other unit-specific supplies A Alarms ? Bed alarm is set for fall risk patients ? Ensure chair alarm is in place and activated if patient is up in a chair L Lines ? Check IV for any infiltration ? Krueger bag is empty if patient has a Krueger ? Tubing and IV bags are labeled Adrianna Longs Safety ? Room is clean, patient is clean, and equipment is clean. ? Hallways are clear from equipment besides carts. ? Fall bracelet on for fall risk patients ? Ensure room is clear and free of clutter ? Suction is set up for ALL patients (with vania) ? Hallways are clear from equipment besides carts. ? Isolation precautions followed, supplies available outside room, sign posted Karime Clark RN

## 2018-10-17 NOTE — ED NOTES
TRANSFER - OUT REPORT: 
 
Verbal report given to DANIEL Kuhn (name) on Malachi Schwab  being transferred to 4N (unit) for routine progression of care Report consisted of patients Situation, Background, Assessment and  
Recommendations(SBAR). Information from the following report(s) SBAR, ED Summary and MAR was reviewed with the receiving nurse. Lines:  
Peripheral IV 10/17/18 Left Arm (Active) Site Assessment Clean, dry, & intact 10/17/2018  3:32 PM  
Phlebitis Assessment 0 10/17/2018  3:32 PM  
Infiltration Assessment 0 10/17/2018  3:32 PM  
Dressing Status Clean, dry, & intact 10/17/2018  3:32 PM  
Hub Color/Line Status Blue 10/17/2018  3:32 PM  
  
 
Opportunity for questions and clarification was provided. Patient transported with: 
Wheel Chair

## 2018-10-17 NOTE — PROGRESS NOTES
conducted an initial consultation and Spiritual Assessment for Phong Leung, who is a 76 y.o.,female. Patients Primary Language is: [place]. According to the patients EMR Zoroastrian Affiliation is: Fernanda Goff. The reason the Patient came to the hospital is:  
Patient Active Problem List  
 Diagnosis Date Noted  Heel ulcer ([address] 10/17/2018  Osteomyelitis ([address] 08/16/2018  Foot osteomyelitis, right ([address] 08/16/2018  Severe obesity (BMI 35.0-39. 9) with comorbidity ([address] 05/24/2018  Obesity (BMI 30.0-34.9) 05/04/2018  Type 2 diabetes with nephropathy ([address] 03/12/2018  Neurogenic bladder 10/12/2017  Biventricular cardiac pacemaker in situ 09/13/2017  Hypervolemia 06/06/2017  Anemia 06/06/2017  Acute paraplegia ([address] 04/20/2017  Sepsis ([address] 04/20/2017  Psoas abscess, right ([address] 04/20/2017  Krueger catheter in place on admission 04/20/2017  Urinary tract infection due to Enterococcus 04/20/2017  Group B streptococcal infection 04/20/2017  Gout  History of Coumadin therapy  Decreased calculated glomerular filtration rate (GFR) 03/30/2017  History of pyelonephritis 03/30/2017  Iliopsoas muscle hematoma 03/30/2017  Psoas hematoma, right, secondary to anticoagulant therapy 03/30/2017  Impaired mobility and ADLs 03/30/2017  Obesity (BMI 35.0-39.9 without comorbidity) 03/13/2017  Chronic systolic heart failure ([address]  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  Benign hypertensive heart disease with systolic CHF, NYHA class 2 ([address] 09/05/2012  Chronic anemia 09/05/2012  History of deep venous thrombosis 09/05/2012  Anticoagulated on Coumadin 09/05/2012  Difficult airway for intubation 08/22/2012  AICD generator infection ([address] 08/20/2012  Obstructive sleep apnea on CPAP 02/07/2012  Nonischemic cardiomyopathy ([address] 06/28/2011  History of complete heart block 06/28/2011  Left bundle branch block (LBBB) on electrocardiogram 06/28/2011  Type 2 diabetes mellitus with diabetic neuropathy ([facility] 06/28/2011  Dyslipidemia 06/28/2011  Diabetic neuropathy associated with type 2 diabetes mellitus ([facility] [facility] 06/28/2011  Biventricular implantable cardioverter-defibrillator in situ 04/28/2005 The  provided the following Interventions: 
Initiated a relationship of care and support. Explored issues of braulio, belief, spirituality and Catholic/ritual needs while hospitalized. Listened empathically. Provided chaplaincy education. Offered prayer and assurance of continued prayers on patient's behalf. Chart reviewed. The following outcomes where achieved: 
Patient shared limited information about both their medical narrative and spiritual journey/beliefs. Patient processed feeling about current hospitalization. Patient expressed gratitude for 's visit. Assessment: 
Patient does not have any Catholic/cultural needs that will affect patients preferences in health care. There are no spiritual or Catholic issues which require intervention at this time. Plan: 
Chaplains will continue to follow and will provide pastoral care on an as needed/requested basis.  recommends bedside caregivers page  on duty if patient shows signs of acute spiritual or emotional distress. [address] [place] [address] Spiritual Care  
[phone]

## 2018-10-17 NOTE — ED PROVIDER NOTES
EMERGENCY DEPARTMENT HISTORY AND PHYSICAL EXAM 
 
11:58 AM 
 
 
Date: 10/17/2018 Patient Name: Kade Brice History of Presenting Illness No chief complaint on file. History Provided By: Patient Chief Complaint: Wound check Duration: several Days Timing:  Worsening Location: Right heel Quality: Ulcer Severity: Severe Modifying Factors: No modifying factors Associated Symptoms: foul smell Additional History (Context): 12:06 PM Kade Brice is a 76 y.o. female with h/o DM and CHF who presents to ED complaining of worsened severe ulcer to the right heel associated with a foul smell onset several days. Patient states that she hd wound care and the wound seemed to be improving but then yesterday her nurse noted a foul smell and a weird appearance. States that she went to Dr. Darleen Domingo office and was sent to the ED for admission to the hospital. Denies any fever or being on abx. Denies having any vascular issues or a vascular specialist. States she has allergies to a lot of medicines including vancomycin and \"all the cillins\". Admits to having shin pain. No other concerns or symptoms at this time. PCP: Modesta Hunt MD 
 
 
Current Outpatient Medications Medication Sig Dispense Refill  cholecalciferol, VITAMIN D3, (VITAMIN D3) 5,000 unit tab tablet Take  by mouth daily.  carvedilol (COREG) 6.25 mg tablet  SITagliptin (JANUVIA) 50 mg tablet Take 50 mg by mouth daily.  ferrous sulfate 325 mg (65 mg iron) tablet Take 1 Tab by mouth two (2) times daily (with meals). 100 Tab 0  
 Lactobacillus Acidoph & Bulgar (FLORANEX) 1 million cell tab tablet Take 1 Tab by mouth two (2) times a day.  60 Tab 0  
 furosemide (LASIX) 40 mg tablet 1 tab daily  Indications: Edema (Patient taking differently: 40 mg. 1 tab daily  Indications: Edema, patient taking 60mg for the past 2 weeks) 60 Tab 0  
 gabapentin (NEURONTIN) 300 mg capsule Take 2 Caps by mouth two (2) times a day. Indications: NEUROPATHIC PAIN 100 Cap 0  
 insulin lispro (HUMALOG) 100 unit/mL injection See sliding scale (Patient taking differently: See sliding scale  Indications: patient taking 5 units with meals) 1 Vial 0  
 potassium chloride (K-DUR, KLOR-CON) 20 mEq tablet Take 1 Tab by mouth two (2) times a day. 30 Tab 0  
 folic acid (FOLVITE) 1 mg tablet Take 1 Tab by mouth daily. 30 Tab 0  
 insulin glargine (LANTUS) 100 unit/mL injection 5 Units by SubCUTAneous route nightly. Indications: type 2 diabetes mellitus (Patient taking differently: 18 Units by SubCUTAneous route nightly. Indications: type 2 diabetes mellitus) 1 Vial 0  
 famotidine (PEPCID) 20 mg tablet Take 1 Tab by mouth nightly. (Patient taking differently: Take 20 mg by mouth daily as needed.) 30 Tab 0  pravastatin (PRAVACHOL) 40 mg tablet Take 40 mg by mouth nightly. Indications: DYSLIPIDEMIA Past History Past Medical History: 
Past Medical History:  
Diagnosis Date  Acute paraplegia ([facility] [address] 4/20/2017  Benign hypertensive heart disease with systolic CHF, NYHA class 2 ([facility] [address] 9/5/2012  Biventricular implantable cardioverter-defibrillator in situ 04/28/2005 Upgraded to BiV AICD; gen change 4/2008; pocket revision 10/2009; Abdominal - done on 8/22/2012 by Dr. Katy Tran  Cardiac cath 08/15/1996 Patent coronaries. Elev LVEDP. EF 50-55%.  Cardiac echocardiogram 06/23/2015 Ltd study. EF 45-50%. Mild, diffuse hypk. Severe apical hypk. No mass or thrombus was clearly identified, although imaging was suboptimal.    
 Cardiac nuclear imaging test 06/19/2015 Fixed distal apical, distal septal defect more likely due to RV pacing than prior infarct. No ischemia. EF 46%. RWMA c/w RV pacing. Nondiagnostic EKG on pharm stress test.    
 Cardiovascular lower extremity venous duplex 09/04/2012 Acute, non-occlusive DVT in CFV on right. No DVT on left. No superficial thrombosis bilaterally.  Cardiovascular upper extremity venous duplex 08/27/2012 DVT in axillary vein on left. Left subclavian was not visualized.  Chronic anemia 9/5/2012  Chronic systolic heart failure ([address]  Decreased calculated glomerular filtration rate (GFR) 3/30/2017 Calculated GFR equivalent to that of CKD stage 3 = 30-59 ml/min  Diabetic neuropathy associated with type 2 diabetes mellitus ([address] 6/28/2011  Difficult airway for intubation 08/22/2012  
 see anesthesia airway note  Dyslipidemia 6/28/2011  Gout  History of complete heart block 6/28/2011  History of Coumadin therapy Anticoagulation for DVT of the LUE; Discontinued on 3/30/2017  History of deep venous thrombosis 9/5/2012 Left upper extremity  History of pyelonephritis 3/30/2017  Left bundle branch block (LBBB) on electrocardiogram 6/28/2011  Nonischemic cardiomyopathy ([address] 6/28/2011  Obesity (BMI 35.0-39.9 without comorbidity) 3/13/2017  Obstructive sleep apnea on CPAP 2/7/2012  Psoas abscess, right ([address] 4/20/2017  Psoas hematoma, right, secondary to anticoagulant therapy 3/30/2017  Type 2 diabetes mellitus with diabetic neuropathy ([address] 6/28/2011 Past Surgical History: 
Past Surgical History:  
Procedure Laterality Date  HX CARPAL TUNNEL RELEASE  4/07 right [address]  HX OTHER SURGICAL  6/11/2012 AICD revision  HX PACEMAKER  4/28/2005 [employer] AICD Family History: 
Family History Problem Relation Age of Onset  Cancer Father Leukemia Social History: 
Social History Tobacco Use  Smoking status: Never Smoker  Smokeless tobacco: Never Used Substance Use Topics  Alcohol use: No  
 Drug use: No  
 
 
Allergies: Allergies Allergen Reactions  Vancomycin Itching  Ampicillin Itching  Bactrim [Sulfamethoxazole-Trimethoprim] Unknown (comments)  Blueberry Swelling Causes throat swelling  Ciprofloxacin Itching  Codeine Other (comments) Jumpy feeling  Crestor [Rosuvastatin] Itching  Darvocet A500 [Propoxyphene N-Acetaminophen] Itching  Demerol [Meperidine] Itching  Levaquin [Levofloxacin] Itching  Lipitor [Atorvastatin] Myalgia  Magnesium Oxide Itching  
  nausea  Minocin [Minocycline] Unknown (comments)  Pcn [Penicillins] Itching  Pravachol [Pravastatin] Swelling Swelling in mouth. Not allergic per patient, takes at home  Shellfish Derived Unknown (comments)  Sulfa (Sulfonamide Antibiotics) Itching  Ultracet [Tramadol-Acetaminophen] Itching  Vicodin [Hydrocodone-Acetaminophen] Unknown (comments)  Vytorin 10-10 [Ezetimibe-Simvastatin] Myalgia  Percodan [Oxycodone Hcl-Oxycodone-Asa] Itching Review of Systems Review of Systems Constitutional: Negative for chills and fever. Musculoskeletal:  
     Admits to right shin pain Skin: Positive for wound (ulcer on right heel). All other systems reviewed and are negative. Visit Vitals /81 (BP 1 Location: Left arm, BP Patient Position: Sitting) Pulse 75 Temp 98.3 °F (36.8 °C) Resp 18 Ht 5' 7\" (1.702 m) Wt 111.6 kg (246 lb) SpO2 99% BMI 38.53 kg/m² Physical Exam / Medical Decision Making I am the first provider for this patient. I reviewed the vital signs, available nursing notes, past medical history, past surgical history, family history and social history. Vital Signs-Reviewed the patient's vital signs. Physical exam: 
General: Obese, no apparent distress. Head:  Normocephalic atraumatic. Eyes:  Pupils midrange extraocular movements intact. No pallor or conjunctival injection. Nose:  No rhinorrhea, inspection grossly normal.   
Ears:  Grossly normal to inspection, no discharge. Mouth:  Mucous membranes moist, no appreciable intraoral lesion. Neck/Back:  Trachea midline, no asymmetry. Chest:  Grossly normal inspection, symmetric chest rise. Pulmonary:  Clear to auscultation bilaterally no wheezes rhonchi or rales. Cardiovascular:  S1-S2 no murmurs rubs or gallops. Abdomen: Soft, nontender, nondistended no guarding rebound or peritoneal signs. Extremities:  Grossly normal to inspection, peripheral pulses intact  RIGHT foot: 2+ dorsalis pedis pulse, heel ulcer with deep structures visible and foul odor no erythematous streaking no pain but insensate Neurologic:  Alert and oriented no appreciable focal neurologic deficit Skin:  Warm and dry Psychiatric:  Grossly normal mood and affect Nursing note reviewed, vital signs reviewed. ED course: 
Patient presents with heel ulcer getting worse despite care at home, afebrile and not tachycardic saturation normal on room air primary concern for a osteomyelitis, no sepsis at this time X-ray per my interpretation no fracture Labs unremarkable Consult:  Discussed care with Dr. Samuel Sullivan. Standard discussion; including history of patients chief complaint, available diagnostic results, and treatment course. Please admit to hospitalist and consult ID Consult:  Discussed care with Dr. Ariela Munoz. Standard discussion; including history of patients chief complaint, available diagnostic results, and treatment course. No antibiotics at this time, wait for cultures Patient has a MRI incompatible prosthesis Patient's presentation, history, physical exam and laboratory evaluations were reviewed. I felt the patient would benefit from inpatient management and treatment. Consult:  Discussed care with Dr. Sandra Rhodes. Standard discussion; including history of patients chief complaint, available diagnostic results, and treatment course. Patient was accepted to their service. Disposition: 
 
Admitted to medicine service Portions of this chart were created with Dragon medical speech to text program.   Unrecognized errors may be present. Diagnostic Study Results Labs - Recent Results (from the past 12 hour(s)) CBC WITH AUTOMATED DIFF Collection Time: 10/17/18 12:11 PM  
Result Value Ref Range WBC 7.5 4.6 - 13.2 K/uL  
 RBC 4.17 (L) 4.20 - 5.30 M/uL  
 HGB 11.7 (L) 12.0 - 16.0 g/dL HCT 34.2 (L) 35.0 - 45.0 % MCV 82.0 74.0 - 97.0 FL  
 MCH 28.1 24.0 - 34.0 PG  
 MCHC 34.2 31.0 - 37.0 g/dL  
 RDW 14.3 11.6 - 14.5 % PLATELET 834 714 - 990 K/uL MPV 9.1 (L) 9.2 - 11.8 FL  
 NEUTROPHILS 65 40 - 73 % LYMPHOCYTES 27 21 - 52 % MONOCYTES 6 3 - 10 % EOSINOPHILS 2 0 - 5 % BASOPHILS 0 0 - 2 %  
 ABS. NEUTROPHILS 4.9 1.8 - 8.0 K/UL  
 ABS. LYMPHOCYTES 2.1 0.9 - 3.6 K/UL  
 ABS. MONOCYTES 0.4 0.05 - 1.2 K/UL  
 ABS. EOSINOPHILS 0.2 0.0 - 0.4 K/UL  
 ABS. BASOPHILS 0.0 0.0 - 0.1 K/UL  
 DF AUTOMATED METABOLIC PANEL, COMPREHENSIVE Collection Time: 10/17/18 12:11 PM  
Result Value Ref Range Sodium 136 136 - 145 mmol/L Potassium 4.0 3.5 - 5.5 mmol/L Chloride 104 100 - 108 mmol/L  
 CO2 24 21 - 32 mmol/L Anion gap 8 3.0 - 18 mmol/L Glucose 140 (H) 74 - 99 mg/dL BUN 24 (H) 7.0 - 18 MG/DL Creatinine 1.39 (H) 0.6 - 1.3 MG/DL  
 BUN/Creatinine ratio 17 12 - 20 GFR est AA 46 (L) >60 ml/min/1.73m2 GFR est non-AA 38 (L) >60 ml/min/1.73m2 Calcium 9.0 8.5 - 10.1 MG/DL Bilirubin, total 0.6 0.2 - 1.0 MG/DL  
 ALT (SGPT) 27 13 - 56 U/L  
 AST (SGOT) 25 15 - 37 U/L Alk. phosphatase 120 (H) 45 - 117 U/L Protein, total 8.4 (H) 6.4 - 8.2 g/dL Albumin 3.0 (L) 3.4 - 5.0 g/dL Globulin 5.4 (H) 2.0 - 4.0 g/dL A-G Ratio 0.6 (L) 0.8 - 1.7 POC LACTIC ACID Collection Time: 10/17/18 12:13 PM  
Result Value Ref Range Lactic Acid (POC) 1.0 0.4 - 2.0 mmol/L PROTHROMBIN TIME + INR Collection Time: 10/17/18  2:10 PM  
Result Value Ref Range Prothrombin time 12.9 11.5 - 15.2 sec INR 1.0 0.8 - 1.2 PTT Collection Time: 10/17/18  2:10 PM  
Result Value Ref Range aPTT 21.9 (L) 23.0 - 36.4 SEC Radiologic Studies -  
XR FOOT RT MIN 3 V Final Result Diagnosis Clinical Impression: No diagnosis found. Follow-up Information None Medication List  
  
ASK your doctor about these medications   
carvedilol 6.25 mg tablet Commonly known as:  COREG 
  
cholecalciferol (VITAMIN D3) 5,000 unit Tab tablet Commonly known as:  VITAMIN D3 
  
famotidine 20 mg tablet Commonly known as:  PEPCID Take 1 Tab by mouth nightly. ferrous sulfate 325 mg (65 mg iron) tablet Take 1 Tab by mouth two (2) times daily (with meals). folic acid 1 mg tablet Commonly known as:  [employer] Take 1 Tab by mouth daily. furosemide 40 mg tablet Commonly known as:  LASIX 
1 tab daily  Indications: Edema 
  
gabapentin 300 mg capsule Commonly known as:  NEURONTIN Take 2 Caps by mouth two (2) times a day. Indications: NEUROPATHIC PAIN 
  
insulin glargine 100 unit/mL injection Commonly known as:  LANTUS U-100 INSULIN 
5 Units by SubCUTAneous route nightly. Indications: type 2 diabetes mellitus 
  
insulin lispro 100 unit/mL injection Commonly known as:  HUMALOG See sliding scale JANUVIA 50 mg tablet Generic drug:  SITagliptin Lactobacillus Acidoph & Bulgar 1 million cell Tab tablet Commonly known as:  Crittenden Ling Take 1 Tab by mouth two (2) times a day. potassium chloride 20 mEq tablet Commonly known as:  K-DUR, KLOR-CON Take 1 Tab by mouth two (2) times a day. pravastatin 40 mg tablet Commonly known as:  PRAVACHOL 
  
  
 
_______________________________ Attestations: 
Scribe Attestation Vicky Avila acting as a scribe for and in the presence of Flavia Candelaria MD     
October 17, 2018 at 11:58 AM 
    
Provider Attestation: I personally performed the services described in the documentation, reviewed the documentation, as recorded by the scribe in my presence, and it accurately and completely records my words and actions. October 17, 2018 at 11:58 AM - Birdie Fraser MD   
_______________________________

## 2018-10-17 NOTE — ED TRIAGE NOTES
Pt. States \"Dr. Rodger Acosta sent me here to be admitted; I have an infection in my right heel\". Observed coban to foot along with large foam dressing.

## 2018-10-17 NOTE — PROGRESS NOTES
I spoke with Dr. Amish Grover to inform him this patient has a Synchra CRT-P Pacemaker that IS NOT MRI Conditional and it NOT Safe to scan this patient in MRI. The pacemaker was checked with the [employer] website and with Stefan Condon

## 2018-10-17 NOTE — H&P
History and Physical 
NAME:  Tevin Alatorre :   1950 MRN:   [id number] Date/Time:  10/17/2018 CHIEF COMPLAINT: right foot infection HISTORY OF PRESENT ILLNESS:    
Ms. Tom Ellis is a 76 y.o.   female with a PMH of DM-2, CHF, HTN, hyperlipidemia, gout and HALI who presents with c/c of right foot infection. She has chronic right heel ulcer which become more worse over the past few days with foul smelling discharge. She denies fever or chills. Denies pain in the foot but has swelling. Her home nurse has noticed the changes and she went to see her podiatrist Dr Melissa Whitlock who send her to ED for admission and IV antibiotics. ED physician has also consulted ID, Dr Harshal Khan who recommended to get xray and hold off antibiotics for now according to Dr Brian Sorensen in the ED. She denies chest pain, cough, SOB or orthopnea. No other complaint at this time. Past Medical History:  
Diagnosis Date  Acute paraplegia ([facility] 2017  Benign hypertensive heart disease with systolic CHF, NYHA class 2 ([address] 2012  Biventricular implantable cardioverter-defibrillator in situ 2005 Upgraded to BiV AICD; gen change 2008; pocket revision 10/2009; Abdominal - done on 2012 by Dr. Amanda Harper  Cardiac cath 08/15/1996 Patent coronaries. Elev LVEDP. EF 50-55%.  Cardiac echocardiogram 2015 Ltd study. EF 45-50%. Mild, diffuse hypk. Severe apical hypk. No mass or thrombus was clearly identified, although imaging was suboptimal.    
 Cardiac nuclear imaging test 2015 Fixed distal apical, distal septal defect more likely due to RV pacing than prior infarct. No ischemia. EF 46%. RWMA c/w RV pacing. Nondiagnostic EKG on pharm stress test.    
 Cardiovascular lower extremity venous duplex 2012 Acute, non-occlusive DVT in CFV on right. No DVT on left. No superficial thrombosis bilaterally.  Cardiovascular upper extremity venous duplex 08/27/2012 DVT in axillary vein on left. Left subclavian was not visualized.  Chronic anemia 9/5/2012  Chronic systolic heart failure ([address]  Decreased calculated glomerular filtration rate (GFR) 3/30/2017 Calculated GFR equivalent to that of CKD stage 3 = 30-59 ml/min  Diabetic neuropathy associated with type 2 diabetes mellitus ([address] 6/28/2011  Difficult airway for intubation 08/22/2012  
 see anesthesia airway note  Dyslipidemia 6/28/2011  Gout  History of complete heart block 6/28/2011  History of Coumadin therapy Anticoagulation for DVT of the LUE; Discontinued on 3/30/2017  History of deep venous thrombosis 9/5/2012 Left upper extremity  History of pyelonephritis 3/30/2017  Left bundle branch block (LBBB) on electrocardiogram 6/28/2011  Nonischemic cardiomyopathy ([address] 6/28/2011  Obesity (BMI 35.0-39.9 without comorbidity) 3/13/2017  Obstructive sleep apnea on CPAP 2/7/2012  Psoas abscess, right ([address] 4/20/2017  Psoas hematoma, right, secondary to anticoagulant therapy 3/30/2017  Type 2 diabetes mellitus with diabetic neuropathy ([address] 6/28/2011 Past Surgical History:  
Procedure Laterality Date  HX CARPAL TUNNEL RELEASE  4/07 right [address] [place] [address]  HX OTHER SURGICAL  6/11/2012 AICD revision  HX PACEMAKER  4/28/2005 [employer] AICD Social History Tobacco Use  Smoking status: Never Smoker  Smokeless tobacco: Never Used Substance Use Topics  Alcohol use: No  
  
 
Family History Problem Relation Age of Onset  Cancer Father Leukemia Allergies Allergen Reactions  Vancomycin Itching  Ampicillin Itching  Bactrim [Sulfamethoxazole-Trimethoprim] Unknown (comments)  Blueberry Swelling Causes throat swelling  Ciprofloxacin Itching  Codeine Other (comments) Jumpy feeling  Crestor [Rosuvastatin] Itching  Darvocet A500 [Propoxyphene N-Acetaminophen] Itching  Demerol [Meperidine] Itching  Levaquin [Levofloxacin] Itching  Lipitor [Atorvastatin] Myalgia  Magnesium Oxide Itching  
  nausea  Minocin [Minocycline] Unknown (comments)  Pcn [Penicillins] Itching  Pravachol [Pravastatin] Swelling Swelling in mouth. Not allergic per patient, takes at home  Shellfish Derived Unknown (comments)  Sulfa (Sulfonamide Antibiotics) Itching  Ultracet [Tramadol-Acetaminophen] Itching  Vicodin [Hydrocodone-Acetaminophen] Unknown (comments)  Vytorin 10-10 [Ezetimibe-Simvastatin] Myalgia  Percodan [Oxycodone Hcl-Oxycodone-Asa] Itching Prior to Admission medications Medication Sig Start Date End Date Taking? Authorizing Provider  
cholecalciferol, VITAMIN D3, (VITAMIN D3) 5,000 unit tab tablet Take  by mouth daily. Provider, Historical  
carvedilol (COREG) 6.25 mg tablet  5/21/18   Provider, Historical  
SITagliptin (JANUVIA) 50 mg tablet Take 50 mg by mouth daily. Provider, Historical  
ferrous sulfate 325 mg (65 mg iron) tablet Take 1 Tab by mouth two (2) times daily (with meals). 6/16/17   MD Clara Carreno & Stephanie [facility]) 1 million cell tab tablet Take 1 Tab by mouth two (2) times a day. 6/16/17   Jeremiah Ibarra MD  
furosemide (LASIX) 40 mg tablet 1 tab daily  Indications: Edema Patient taking differently: 40 mg. 1 tab daily  Indications: Edema, patient taking 60mg for the past 2 weeks 6/16/17   Jeremiah Ibarra MD  
gabapentin (NEURONTIN) 300 mg capsule Take 2 Caps by mouth two (2) times a day. Indications: NEUROPATHIC PAIN 6/16/17   Jeremiah Ibarra MD  
insulin lispro (HUMALOG) 100 unit/mL injection See sliding scale Patient taking differently: See sliding scale  Indications: patient taking 5 units with meals 6/16/17   Jeremiah Ibarra MD  
 potassium chloride (K-DUR, KLOR-CON) 20 mEq tablet Take 1 Tab by mouth two (2) times a day. 6/16/17   Kaye Neil MD  
folic acid (FOLVITE) 1 mg tablet Take 1 Tab by mouth daily. 5/25/17   Gurvinder De Jesus MD  
insulin glargine (LANTUS) 100 unit/mL injection 5 Units by SubCUTAneous route nightly. Indications: type 2 diabetes mellitus Patient taking differently: 18 Units by SubCUTAneous route nightly. Indications: type 2 diabetes mellitus 5/25/17   Gurvinder De Jesus MD  
famotidine (PEPCID) 20 mg tablet Take 1 Tab by mouth nightly. Patient taking differently: Take 20 mg by mouth daily as needed. 5/25/17   Gurvinder De Jesus MD  
pravastatin (PRAVACHOL) 40 mg tablet Take 40 mg by mouth nightly. Indications: DYSLIPIDEMIA    Provider, Historical  
 
 
REVIEW OF SYSTEMS:  
 
CONSTITUTIONAL: No Fever, No chills, No weight loss, No Night sweats HEENT:  No epistaxis, No diff in swallowing CVS: No chest pain, No palpitations, No syncope, No peripheral edema, No PND, No orthopnea RS: No shortness of breath, No cough, No hemoptysis, No pleuritic chest pain GI: No abd pain, No vomitting, No diarrhea, No hematemesis, No rectal bleeding, No acid reflux or heartburn NEURO: No focal weakness, No headaches, No seizures PSYCH: No anxiety, No depression MUSCULOSKLETAL: right foot wound : No hematuria or dysuria SKIN: No rash Physical Exam: VITALS:   
Vital signs reviewed; most recent are: 
 
Visit Vitals /81 (BP 1 Location: Left arm, BP Patient Position: Sitting) Pulse 75 Temp 98.3 °F (36.8 °C) Resp 18 Ht 5' 7\" (1.702 m) Wt 111.6 kg (246 lb) SpO2 99% BMI 38.53 kg/m² SpO2 Readings from Last 6 Encounters:  
10/17/18 99% 09/17/18 98% 09/04/18 100% 08/24/18 99% 07/19/18 99% 03/12/18 97% No intake or output data in the 24 hours ending 10/17/18 1436 GENERAL: Not in acute distress HEENT: pink conjunctiva, un icteric sclera,  
 NECK: No lymphadenopthy or thyroid swelling, JVD not seen LYMPH: No supraclavicular or cervical or axillary nodes on both sides CVS: S1S2, No murmurs, No gallop or rub RS: CTA, No wheezing or crackles Abd: Soft, non tender, not distended, No guarding, No rigidity NEURO:  No focal neurologic deficits Extrm: right heel ulcer Skin: No rash Labs: 
Recent Results (from the past 24 hour(s)) CBC WITH AUTOMATED DIFF Collection Time: 10/17/18 12:11 PM  
Result Value Ref Range WBC 7.5 4.6 - 13.2 K/uL  
 RBC 4.17 (L) 4.20 - 5.30 M/uL  
 HGB 11.7 (L) 12.0 - 16.0 g/dL HCT 34.2 (L) 35.0 - 45.0 % MCV 82.0 74.0 - 97.0 FL  
 MCH 28.1 24.0 - 34.0 PG  
 MCHC 34.2 31.0 - 37.0 g/dL  
 RDW 14.3 11.6 - 14.5 % PLATELET 474 365 - 069 K/uL MPV 9.1 (L) 9.2 - 11.8 FL  
 NEUTROPHILS 65 40 - 73 % LYMPHOCYTES 27 21 - 52 % MONOCYTES 6 3 - 10 % EOSINOPHILS 2 0 - 5 % BASOPHILS 0 0 - 2 %  
 ABS. NEUTROPHILS 4.9 1.8 - 8.0 K/UL  
 ABS. LYMPHOCYTES 2.1 0.9 - 3.6 K/UL  
 ABS. MONOCYTES 0.4 0.05 - 1.2 K/UL  
 ABS. EOSINOPHILS 0.2 0.0 - 0.4 K/UL  
 ABS. BASOPHILS 0.0 0.0 - 0.1 K/UL  
 DF AUTOMATED METABOLIC PANEL, COMPREHENSIVE Collection Time: 10/17/18 12:11 PM  
Result Value Ref Range Sodium 136 136 - 145 mmol/L Potassium 4.0 3.5 - 5.5 mmol/L Chloride 104 100 - 108 mmol/L  
 CO2 24 21 - 32 mmol/L Anion gap 8 3.0 - 18 mmol/L Glucose 140 (H) 74 - 99 mg/dL BUN 24 (H) 7.0 - 18 MG/DL Creatinine 1.39 (H) 0.6 - 1.3 MG/DL  
 BUN/Creatinine ratio 17 12 - 20 GFR est AA 46 (L) >60 ml/min/1.73m2 GFR est non-AA 38 (L) >60 ml/min/1.73m2 Calcium 9.0 8.5 - 10.1 MG/DL Bilirubin, total 0.6 0.2 - 1.0 MG/DL  
 ALT (SGPT) 27 13 - 56 U/L  
 AST (SGOT) 25 15 - 37 U/L Alk. phosphatase 120 (H) 45 - 117 U/L Protein, total 8.4 (H) 6.4 - 8.2 g/dL Albumin 3.0 (L) 3.4 - 5.0 g/dL Globulin 5.4 (H) 2.0 - 4.0 g/dL A-G Ratio 0.6 (L) 0.8 - 1.7 POC LACTIC ACID  
 Collection Time: 10/17/18 12:13 PM  
Result Value Ref Range Lactic Acid (POC) 1.0 0.4 - 2.0 mmol/L PROTHROMBIN TIME + INR Collection Time: 10/17/18  2:10 PM  
Result Value Ref Range Prothrombin time 12.9 11.5 - 15.2 sec INR 1.0 0.8 - 1.2 PTT Collection Time: 10/17/18  2:10 PM  
Result Value Ref Range aPTT 21.9 (L) 23.0 - 36.4 SEC Active Problems: 
  Heel ulcer ([address] (10/17/2018) Assessment: 1. Right diabetic foot infection 2. DM-2, controlled 3. Chronic systolic CHF, EF 00-60% 4. HTN, controlled 5. Hyperlipidemia,  
6. Gout 7. HALI Plan: · Admit to medical floor · Podiatry and ID already consulted per ED · Per ED physician, Dr Navjot French has said that he is told by ID to hold off antibiotics for now. · Resume home medications · Wound care consult · Monitor blood glucose, needs tight glycemic control. · Will resume lantus and SSI insulin · Full code · DVT prophylaxsis Total time:  58 minutes 
 
        
_______________________________________________________________________ Attending Physician:  Philly Man MD  
 
 
Copies: Luis Shepherd MD

## 2018-10-18 ENCOUNTER — ANESTHESIA EVENT (OUTPATIENT)
Dept: SURGERY | Age: 68
DRG: 629 | End: 2018-10-18
Payer: COMMERCIAL

## 2018-10-18 LAB
ABO + RH BLD: NORMAL
ANION GAP SERPL CALC-SCNC: 9 MMOL/L (ref 3–18)
BASOPHILS # BLD: 0 K/UL (ref 0–0.1)
BASOPHILS NFR BLD: 1 % (ref 0–2)
BLOOD GROUP ANTIBODIES SERPL: NORMAL
BUN SERPL-MCNC: 25 MG/DL (ref 7–18)
BUN/CREAT SERPL: 19 (ref 12–20)
CALCIUM SERPL-MCNC: 8.5 MG/DL (ref 8.5–10.1)
CHLORIDE SERPL-SCNC: 106 MMOL/L (ref 100–108)
CO2 SERPL-SCNC: 26 MMOL/L (ref 21–32)
CREAT SERPL-MCNC: 1.35 MG/DL (ref 0.6–1.3)
DIFFERENTIAL METHOD BLD: ABNORMAL
EOSINOPHIL # BLD: 0.2 K/UL (ref 0–0.4)
EOSINOPHIL NFR BLD: 3 % (ref 0–5)
ERYTHROCYTE [DISTWIDTH] IN BLOOD BY AUTOMATED COUNT: 14.4 % (ref 11.6–14.5)
ERYTHROCYTE [SEDIMENTATION RATE] IN BLOOD: 62 MM/HR (ref 0–30)
EST. AVERAGE GLUCOSE BLD GHB EST-MCNC: 203 MG/DL
GLUCOSE BLD STRIP.AUTO-MCNC: 167 MG/DL (ref 70–110)
GLUCOSE BLD STRIP.AUTO-MCNC: 175 MG/DL (ref 70–110)
GLUCOSE BLD STRIP.AUTO-MCNC: 215 MG/DL (ref 70–110)
GLUCOSE SERPL-MCNC: 125 MG/DL (ref 74–99)
HBA1C MFR BLD: 8.7 % (ref 4.2–5.6)
HCT VFR BLD AUTO: 30.4 % (ref 35–45)
HGB BLD-MCNC: 10 G/DL (ref 12–16)
LYMPHOCYTES # BLD: 2.1 K/UL (ref 0.9–3.6)
LYMPHOCYTES NFR BLD: 33 % (ref 21–52)
MCH RBC QN AUTO: 27.6 PG (ref 24–34)
MCHC RBC AUTO-ENTMCNC: 32.9 G/DL (ref 31–37)
MCV RBC AUTO: 84 FL (ref 74–97)
MONOCYTES # BLD: 0.3 K/UL (ref 0.05–1.2)
MONOCYTES NFR BLD: 5 % (ref 3–10)
NEUTS SEG # BLD: 3.8 K/UL (ref 1.8–8)
NEUTS SEG NFR BLD: 58 % (ref 40–73)
PLATELET # BLD AUTO: 208 K/UL (ref 135–420)
PMV BLD AUTO: 9.1 FL (ref 9.2–11.8)
POTASSIUM SERPL-SCNC: 3.4 MMOL/L (ref 3.5–5.5)
RBC # BLD AUTO: 3.62 M/UL (ref 4.2–5.3)
SODIUM SERPL-SCNC: 141 MMOL/L (ref 136–145)
SPECIMEN EXP DATE BLD: NORMAL
WBC # BLD AUTO: 6.4 K/UL (ref 4.6–13.2)

## 2018-10-18 PROCEDURE — 85025 COMPLETE CBC W/AUTO DIFF WBC: CPT | Performed by: INTERNAL MEDICINE

## 2018-10-18 PROCEDURE — 65270000029 HC RM PRIVATE

## 2018-10-18 PROCEDURE — 82962 GLUCOSE BLOOD TEST: CPT

## 2018-10-18 PROCEDURE — 74011000250 HC RX REV CODE- 250: Performed by: PODIATRIST

## 2018-10-18 PROCEDURE — 74011250637 HC RX REV CODE- 250/637: Performed by: INTERNAL MEDICINE

## 2018-10-18 PROCEDURE — 36415 COLL VENOUS BLD VENIPUNCTURE: CPT | Performed by: INTERNAL MEDICINE

## 2018-10-18 PROCEDURE — 77030018836 HC SOL IRR NACL ICUM -A

## 2018-10-18 PROCEDURE — 80048 BASIC METABOLIC PNL TOTAL CA: CPT | Performed by: INTERNAL MEDICINE

## 2018-10-18 PROCEDURE — 77030020186 HC BOOT HL PROTCT SAGE -B

## 2018-10-18 PROCEDURE — 74011250636 HC RX REV CODE- 250/636: Performed by: INTERNAL MEDICINE

## 2018-10-18 PROCEDURE — 83036 HEMOGLOBIN GLYCOSYLATED A1C: CPT | Performed by: HOSPITALIST

## 2018-10-18 PROCEDURE — 86900 BLOOD TYPING SEROLOGIC ABO: CPT | Performed by: EMERGENCY MEDICINE

## 2018-10-18 PROCEDURE — 85652 RBC SED RATE AUTOMATED: CPT | Performed by: INTERNAL MEDICINE

## 2018-10-18 PROCEDURE — 74011636637 HC RX REV CODE- 636/637: Performed by: INTERNAL MEDICINE

## 2018-10-18 PROCEDURE — 74011636637 HC RX REV CODE- 636/637: Performed by: HOSPITALIST

## 2018-10-18 RX ADMIN — CARVEDILOL 6.25 MG: 6.25 TABLET, FILM COATED ORAL at 17:05

## 2018-10-18 RX ADMIN — INSULIN LISPRO 3 UNITS: 100 INJECTION, SOLUTION INTRAVENOUS; SUBCUTANEOUS at 08:33

## 2018-10-18 RX ADMIN — LACTOBACILLUS TAB 1 TABLET: TAB at 08:34

## 2018-10-18 RX ADMIN — HEPARIN SODIUM 5000 UNITS: 5000 INJECTION, SOLUTION INTRAVENOUS; SUBCUTANEOUS at 20:05

## 2018-10-18 RX ADMIN — HEPARIN SODIUM 5000 UNITS: 5000 INJECTION, SOLUTION INTRAVENOUS; SUBCUTANEOUS at 05:24

## 2018-10-18 RX ADMIN — INSULIN LISPRO 6 UNITS: 100 INJECTION, SOLUTION INTRAVENOUS; SUBCUTANEOUS at 12:30

## 2018-10-18 RX ADMIN — GABAPENTIN 600 MG: 300 CAPSULE ORAL at 17:03

## 2018-10-18 RX ADMIN — SITAGLIPTIN 50 MG: 50 TABLET, FILM COATED ORAL at 08:35

## 2018-10-18 RX ADMIN — FERROUS SULFATE TAB 325 MG (65 MG ELEMENTAL FE) 325 MG: 325 (65 FE) TAB at 08:34

## 2018-10-18 RX ADMIN — HEPARIN SODIUM 5000 UNITS: 5000 INJECTION, SOLUTION INTRAVENOUS; SUBCUTANEOUS at 12:31

## 2018-10-18 RX ADMIN — FOLIC ACID 1 MG: 1 TABLET ORAL at 08:35

## 2018-10-18 RX ADMIN — DAKIN'S SOLUTION 0.125% (QUARTER STRENGTH): 0.12 SOLUTION at 17:55

## 2018-10-18 RX ADMIN — LACTOBACILLUS TAB 1 TABLET: TAB at 17:04

## 2018-10-18 RX ADMIN — GABAPENTIN 600 MG: 300 CAPSULE ORAL at 08:35

## 2018-10-18 RX ADMIN — INSULIN LISPRO 3 UNITS: 100 INJECTION, SOLUTION INTRAVENOUS; SUBCUTANEOUS at 17:03

## 2018-10-18 RX ADMIN — CARVEDILOL 6.25 MG: 6.25 TABLET, FILM COATED ORAL at 08:35

## 2018-10-18 NOTE — PROGRESS NOTES
NUTRITION Nursing Referral: Pressure Injury RECOMMENDATIONS / PLAN:  
 
- Update food preference - Add supplement: Lukas BID, Magic Cup ARTHUR, TID 
- Continue RD inpatient monitoring and evaluation. NUTRITION INTERVENTIONS & DIAGNOSIS:  
 
[x] Meals/snacks: modified composition 
[x] Medical food supplement therapy:  initiate Nutrition Diagnosis:  Inadequate oral intake related to decreased appetite as evidenced by poor meal intake x 2 days PTA and since admission. Increased nutrient needs (protein) related to promotion of wound healing as evidenced by pressure injury ASSESSMENT:  
 
Pt reported appetite and meal intake are usually good; decreased x 2 days PTA and since admission. Poor meal intake this morning. Food preference discussed. Agreeable to nutrition supplement; options discussed. Pt with pressure injury; agreeable to protein supplement Average po intake adequate to meet patients estimated nutritional needs:   [] Yes     [x] No   [] Unable to determine at this time Diet: DIET DIABETIC CONSISTENT CARB Regular Food Allergies:  Blueberry, shellfish Current Appetite:   [] Good     [x] Fair     [x] Poor     [] Other: 
Appetite/meal intake prior to admission:   [x] Good: usually    [] Fair     [x] Poor: x 2 days PTA    [] Other: 
Feeding Limitations:  [] Swallowing difficulty    [] Chewing difficulty    [] Other: 
Current Meal Intake: No data found. BM: 10/17 Skin Integrity:  Stage III right heel pressure injury Edema:   [] No     [x] Yes:  LEs Pertinent Medications: Reviewed: pepcid, ferrous sulfate, folic acid, lantus, SSI, zofran, [place] Recent Labs 10/18/18 
0050 10/17/18 
1211  136  
K 3.4* 4.0  
 104 CO2 26 24 * 140* BUN 25* 24* CREA 1.35* 1.39* CA 8.5 9.0 ALB  --  3.0*  
SGOT  --  25 ALT  --  27 Intake/Output Summary (Last 24 hours) at 10/18/2018 1031 Last data filed at 10/18/2018 4144 Gross per 24 hour Intake  Output 2250 ml Net -2250 ml Anthropometrics: 
Ht Readings from Last 1 Encounters:  
10/17/18 5' 7\" (1.702 m) Last 3 Recorded Weights in this Encounter 10/17/18 1138 Weight: 111.6 kg (246 lb) Body mass index is 38.53 kg/m². Weight History:  Pt reported weight gain due to fluid retention PTA Weight Metrics 10/17/2018 9/17/2018 9/4/2018 8/24/2018 8/7/2018 6/27/2018 5/22/2018 Weight 246 lb 244 lb 245 lb 265 lb 234 lb 234 lb 232 lb BMI 38.53 kg/m2 38.22 kg/m2 38.37 kg/m2 41.5 kg/m2 36.65 kg/m2 36.65 kg/m2 36.34 kg/m2 Admitting Diagnosis: Heel ulcer ([address] Pertinent PMHx:  Hypertensive heart disease with systolic CHF, dyslipidemia, gout, DM Education Needs:        [x] None identified  [] Identified - Not appropriate at this time  []  Identified and addressed - refer to education log Learning Limitations:   [x] None identified  [] Identified Cultural, Sikh & ethnic food preferences:  [x] None identified    [] Identified and addressed ESTIMATED NUTRITION NEEDS:  
 
Calories: 0485-9290 kcal (MSJx1.2-1.3) based on  [x] Actual BW: 112 kg      [] IBW Protein: 140-168 gm (1.25-1.5 gm/kg) based on  [x] Actual BW      [] IBW Fluid: 1 mL/kcal 
  
MONITORING & EVALUATION:  
 
Nutrition Goal(s): 1. Po intake of meals will meet >75% of patient estimated nutritional needs within the next 7 days. Outcome:  [] Met/Ongoing    []  Not Met    [x] New/Initial Goal  
 
Monitoring:   [x] Food and beverage intake   [x] Diet order   [x] Nutrition-focused physical findings   [x] Treatment/therapy   [] Weight   [] Enteral nutrition intake Previous Recommendations (for follow-up assessments only):     []   Implemented       []   Not Implemented (RD to address)      [] No Longer Appropriate     [] No Recommendation Made Discharge Planning:  Diabetic diet [x] Participated in care planning, discharge planning, & interdisciplinary rounds as appropriate Frida Flannery RD  
 Pager: [phone]

## 2018-10-18 NOTE — ROUTINE PROCESS
Bedside shift change report given to  Paz RN (oncoming nurse) by Rubio Bowens RN (offgoing nurse). Report included the following information SBAR, Kardex, ED Summary and Recent Results.

## 2018-10-18 NOTE — PROGRESS NOTES
[facility] Hospitalist Group Progress Note Patient: Bertin Art Age: 76 y.o. : 1950 MR#: [id number] SSN: [id number] Date: 10/18/2018 Subjective:  
Alert and oriented X 3. Daughter at bedside. NAD. States was instructed by podiatry and home wound care RN to report to ED for eval of chronic wound. Pt has no complaints. No cp, f/c, n/v/d/c or abd pain. No sob. Daughter states pt has previously received treatment for chronic right foot ulcer, states last cycle of abx X 1 month were \"stopped early\" due to \"allergic reaction. \" Assessment/Plan: 1. Chronic Right foot ulcer infection. VVS. AF, no leukocytosis. Podiatry and ID input appreciated. Scheduled for debridement with wound vac tomorrow. Per ID, hold off on abx for now until procedure tomorrow. Wound care following. 2. DMT2 with neuropathy: a1c 8.7. Lantus. SSI. Neurontin. 3. HTN: controlled. Continue current regimen. 4. SCHF, non-ischemic cardiomyopathy with BiV AICD. Continue current regimen. 5. HLD: statin. 6. 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 Baseline. 8. Elevated Cr superimposed on Chronically decreased calculated GFR, equivalent to that of CKD stage 3. Cr down-trending. Trend renal fx, avoid nephrotoxic agents. 9. Obesity: diet, exercise, lifestyle modification. 10. HALI on CPAP. RT consult for CPAP. Additional Notes:   
 
Case discussed with:  [x]Patient  [x]Family  [x]Nursing  [x]Case Management DVT Prophylaxis:  []Lovenox  [x]Hep SQ  []SCDs  []Coumadin   []On Heparin gtt Objective:  
VS:  
Visit Vitals /68 (BP 1 Location: Left arm, BP Patient Position: At rest) Pulse 74 Temp 98.1 °F (36.7 °C) Resp 18 Ht 5' 7\" (1.702 m) Wt 111.6 kg (246 lb) SpO2 98% Breastfeeding? No  
BMI 38.53 kg/m² Tmax/24hrs: Temp (24hrs), Av °F (36.7 °C), Min:97.2 °F (36.2 °C), Max:98.4 °F (36.9 °C) Intake/Output Summary (Last 24 hours) at 10/18/2018 1540 Last data filed at 10/18/2018 1234 Gross per 24 hour Intake 200 ml Output 2250 ml Net -2050 ml General:  Alert, NAD Cardiovascular:  RRR Pulmonary: diminished to BLL; respiratory effort WNL 
GI:  +BS in all four quadrants, soft, non-tender. Chronic perez. Extremities:1+ pitting BLE edema; 2+ dorsalis pedis pulses bilaterally, right foot ulcer. Neuro: alert and oriented X 3. Labs:   
Recent Results (from the past 24 hour(s)) GLUCOSE, POC Collection Time: 10/17/18  5:19 PM  
Result Value Ref Range Glucose (POC) 162 (H) 70 - 110 mg/dL CULTURE, WOUND W GRAM STAIN Collection Time: 10/17/18  6:30 PM  
Result Value Ref Range Special Requests: NO SPECIAL REQUESTS    
 GRAM STAIN NO WBC'S SEEN    
 GRAM STAIN FEW GRAM POSITIVE COCCI IN PAIRS    
 GRAM STAIN RARE GRAM POSITIVE RODS Culture result: FEW GRAM NEGATIVE RODS (A) Culture result: CULTURE IN PROGRESS,FURTHER UPDATES TO FOLLOW URINALYSIS W/ RFLX MICROSCOPIC Collection Time: 10/17/18  6:45 PM  
Result Value Ref Range Color YELLOW Appearance CLOUDY Specific gravity 1.015 1.005 - 1.030    
 pH (UA) 5.0 5.0 - 8.0 Protein 100 (A) NEG mg/dL Glucose NEGATIVE  NEG mg/dL Ketone NEGATIVE  NEG mg/dL Bilirubin NEGATIVE  NEG Blood TRACE (A) NEG Urobilinogen 0.2 0.2 - 1.0 EU/dL Nitrites POSITIVE (A) NEG Leukocyte Esterase LARGE (A) NEG    
MRSA SCREEN - PCR (NASAL) Collection Time: 10/17/18  6:45 PM  
Result Value Ref Range Special Requests: NO SPECIAL REQUESTS Culture result: (A) MRSA target DNA is detected (presumptive positive for MRSA colonization). Culture result:    
  CALLED TO Leticia Khalil RN 4N AT [phone]. REPEATED TO [phone]. URINE MICROSCOPIC ONLY Collection Time: 10/17/18  6:45 PM  
Result Value Ref Range WBC 35 to 50 0 - 4 /hpf  
 RBC 4 to 11 0 - 5 /hpf  Bacteria 2+ (A) NEG /hpf  
 Amorphous Crystals 1+ (A) NEG Hyaline cast 0 to 4 0 - 2 /lpf  
GLUCOSE, POC Collection Time: 10/17/18  9:44 PM  
Result Value Ref Range Glucose (POC) 226 (H) 70 - 110 mg/dL TYPE & SCREEN Collection Time: 10/18/18 12:50 AM  
Result Value Ref Range Crossmatch Expiration 10/21/2018 ABO/Rh(D) O POSITIVE Antibody screen NEG METABOLIC PANEL, BASIC Collection Time: 10/18/18 12:50 AM  
Result Value Ref Range Sodium 141 136 - 145 mmol/L Potassium 3.4 (L) 3.5 - 5.5 mmol/L Chloride 106 100 - 108 mmol/L  
 CO2 26 21 - 32 mmol/L Anion gap 9 3.0 - 18 mmol/L Glucose 125 (H) 74 - 99 mg/dL BUN 25 (H) 7.0 - 18 MG/DL Creatinine 1.35 (H) 0.6 - 1.3 MG/DL  
 BUN/Creatinine ratio 19 12 - 20 GFR est AA 47 (L) >60 ml/min/1.73m2 GFR est non-AA 39 (L) >60 ml/min/1.73m2 Calcium 8.5 8.5 - 10.1 MG/DL  
CBC WITH AUTOMATED DIFF Collection Time: 10/18/18 12:50 AM  
Result Value Ref Range WBC 6.4 4.6 - 13.2 K/uL  
 RBC 3.62 (L) 4.20 - 5.30 M/uL  
 HGB 10.0 (L) 12.0 - 16.0 g/dL HCT 30.4 (L) 35.0 - 45.0 % MCV 84.0 74.0 - 97.0 FL  
 MCH 27.6 24.0 - 34.0 PG  
 MCHC 32.9 31.0 - 37.0 g/dL  
 RDW 14.4 11.6 - 14.5 % PLATELET 943 329 - 019 K/uL MPV 9.1 (L) 9.2 - 11.8 FL  
 NEUTROPHILS 58 40 - 73 % LYMPHOCYTES 33 21 - 52 % MONOCYTES 5 3 - 10 % EOSINOPHILS 3 0 - 5 % BASOPHILS 1 0 - 2 %  
 ABS. NEUTROPHILS 3.8 1.8 - 8.0 K/UL  
 ABS. LYMPHOCYTES 2.1 0.9 - 3.6 K/UL  
 ABS. MONOCYTES 0.3 0.05 - 1.2 K/UL  
 ABS. EOSINOPHILS 0.2 0.0 - 0.4 K/UL  
 ABS. BASOPHILS 0.0 0.0 - 0.1 K/UL  
 DF AUTOMATED HEMOGLOBIN A1C WITH EAG Collection Time: 10/18/18 12:50 AM  
Result Value Ref Range Hemoglobin A1c 8.7 (H) 4.2 - 5.6 % Est. average glucose 203 mg/dL GLUCOSE, POC Collection Time: 10/18/18  8:09 AM  
Result Value Ref Range Glucose (POC) 175 (H) 70 - 110 mg/dL GLUCOSE, POC Collection Time: 10/18/18 11:52 AM  
Result Value Ref Range Glucose (POC) 215 (H) 70 - 110 mg/dL SED RATE (ESR) Collection Time: 10/18/18  1:37 PM  
Result Value Ref Range Sed rate, automated 62 (H) 0 - 30 mm/hr Signed By: Oseas Chester NP October 18, 2018

## 2018-10-18 NOTE — NURSE NAVIGATOR
Reason for Admission:   Heel ulcer RRAT Score:     30 Resources/supports as identified by patient/family:    
             
Top Challenges facing patient (as identified by patient/family and CM): Finances/Medication cost?       
           
Transportation? They have a wheelchair [facility] Support system or lack thereof? Living arrangements? Lives with  Self-care/ADLs/Cognition? Patient independent with self care but unable to stand/bear weight at all due to previous stroke Current Advanced Directive/Advance Care Plan:   
                       
Plan for utilizing home health:    Is active with [facility] Likelihood of readmission: high Transition of Care Plan: This patient lives in a 1 story home with her spouse. There are 3-4 steps at the entry. She was independent in her self-care/ADL's prior to admission, however is unable to stand or bear any weight on her legs due to previous stroke. Her spouse will be available to transport patient home upon discharge. They have a electric wheelchair, bedside commode, hospital bed and a wheelchair ramp at her home. She is currently active with [facility] and plans on continuing with them upon discharge. Care Management Interventions PCP Verified by CM: Yes Last Visit to PCP: 10/15/18 Mode of Transport at Discharge: [place] [facility] Transition of Care Consult (CM Consult): Home Health [facility] - INPATIENT: No 
Reason Outside [place]: Patient already serviced by other home care/hospice agency Physical Therapy Consult: No 
Occupational Therapy Consult: No 
Current Support Network: Lives with Spouse, Own Home Confirm Follow Up Transport: Wheelchair [facility] [place] Discharge Location Discharge Placement: Home with home health [address] [place] [address]. DURAN Tatum, RN Care Management

## 2018-10-18 NOTE — PROGRESS NOTES
Problem: Falls - Risk of 
Goal: *Absence of Falls Document Genevive Camera Fall Risk and appropriate interventions in the flowsheet. Outcome: Progressing Towards Goal 
Fall Risk Interventions: 
  
 
  
 
Medication Interventions: Patient to call before getting OOB Elimination Interventions: Bed/chair exit alarm Problem: Pressure Injury - Risk of 
Goal: *Prevention of pressure injury Document Surjit Scale and appropriate interventions in the flowsheet. Outcome: Progressing Towards Goal 
Pressure Injury Interventions: 
Sensory Interventions: Assess changes in LOC Activity Interventions: Pressure redistribution bed/mattress(bed type) Mobility Interventions: Pressure redistribution bed/mattress (bed type) Nutrition Interventions: Document food/fluid/supplement intake Friction and Shear Interventions: Foam dressings/transparent film/skin sealants

## 2018-10-18 NOTE — DIABETES MGMT
Diabetes Patient/Family Education RecordFactors That  May Influence Patients Ability  to Learn or  Comply with Recommendations []   Language barrier    []   Cultural needs   []   Motivation  
 []   Cognitive limitation    []   Physical   [x]   Education  
 []   Physiological factors   []   Hearing/vision/speaking impairment   []   Roman Catholic beliefs []   Financial factors   []  Other:   []  No factors identified at this time. Person Instructed: [x]   Patient   []   Family   []  Other Preference for Learning: 
 [x]   Verbal   []   Written   []  Demonstration Level of Comprehension & Competence:   
[x]  Good                                      [] Fair                                     []  Poor                             []  Needs Reinforcement  
[x]  Teachback completed Education Component:  
[x]  Medication management, including how to administer insulin (if appropriate) and potential medication interactions: Patient reported current home diabetes meds PTA: 
Lantus insulin 20 units daily at bedtime. Lispro insulin 4 units 3x daily before meals. [x]  Nutritional management: See separate notes by registered dietician.   
[]  Exercise  
[]  Signs, symptoms, and treatment of hyperglycemia and hypoglycemia  
[] Prevention, recognition and treatment of hyperglycemia and hypoglycemia [x]  Importance of blood glucose monitoring and how to obtain a blood glucose meter: Patient reported that she has BG meter and testing supplies at home. She checks her blood sugar regularly and reported elevated fasting blood sugar in the 200's despite taking her diabetes meds and following recommended diet for diabetes. Patient stated that she's still working with her medical provider, Dr. Michela Milner, about changing her diabetes meds. []  Instruction on use of the blood glucose meter [x]  Discuss the importance of HbA1C monitoring: Patient verbalized understanding about A1c level with last report of 10.2% (8/17/2018) indicating that her diabetes is not controlled. Patient reported that she's still working with her medical provider, Dr. Ro Coreas, about changing her diabetes meds. Entered A1c lab order on 10/18/2018. []  Sick day guidelines  
[]  Proper use and disposal of lancets, needles, syringes or insulin pens (if appropriate) [x]  Potential long-term complications (retinopathy, kidney disease, neuropathy, foot care) [x] Information about whom to contact in case of emergency or for more information   
[x]  Goal:  Patient/family will demonstrate understanding of Diabetes Self Management Skills by: 10/25/2018 Plan for post-discharge education or self-management support: 
  [x] Outpatient class schedule provided: Patient stated no need to give her another copy because she kept the class sched that I gave her from prior hosp admission. [] Patient Declined 
  [] Scheduled for outpatient classes (date) _______ Rajani Callejas RN 
pgr [phone]

## 2018-10-18 NOTE — ROUTINE PROCESS
In to see client. No distress noted. Explained to client that dressing is to be changed. Old dressing removed. Small spot of brown drainage noted. Wound bed majority pink with slough noted. Wound cleaned with normal saline, dermal wound cleanser and then again with normal saline. Moist dakins solution 4x4 dressing x2 applied to wound and covered with dry 4x4 and 1 abd pad. Thin kerlix lightly covered all dressings to hold them in place. Padded heel boot reapplied and heel elevated off bed. Left client resting in bed. Bed low and locked. Call bell close. Client now visiting with daughter and . [facility] / Cherylene Nest, MSN RN [employer] Instructor.

## 2018-10-18 NOTE — DIABETES MGMT
Glycemic Control Plan of Care 
 
T2DM with last A1c level of 10.2% (8/17/2018). Pending result of A1c ordered on 10/18/2018. See separate notes, 10/18/2018, for assessment of home diabetes management and education. Home diabetes meds: Lantus insulin 20 units daily at bedtime and meal time lispro insulin 4 units TID AC. POC BG range on 10/17/2018: 162-226 mg/dL. POC BG report on 10/18/2018 at time of review: 175, 215 mg/dL. Patient is on 5 units lantus insulin QHS and normal sensitivity dose of correctional lispro insulin ACHS. Recommendation(s): 
1.) Consider increasing basal lantus insulin dose from 5 to 10 units daily at bedtime. 2.) Modified correctional lispro insulin to very resistant dose. Assessment: 
Patient is 76year old with past medical history of type 2 diabetes mellitus with neuropathy, acute paraplegia, chronic systolic CHF, biventricular implantable cardioverter defibrillator, DVT left upper extremity, chronic anemia, dyslipidemia, obstructive sleep apnea, psoas abscess right, and pyelonephritis - was admitted on 10/17/2018 with report of right foot infection, foul smell drainage. Noted: 
Right heel ulcer. T2DM with pending A1c result. Most recent blood glucose values: 
 
Results for Harvey Vaz (MRN [id number]) as of 10/18/2018 12:17 Ref. Range 10/17/2018 17:19 10/17/2018 21:44 GLUCOSE,FAST - POC Latest Ref Range: 70 - 110 mg/dL 162 (H) 226 (H) Results for Harvey Vza (MRN [id number]) as of 10/18/2018 12:17 Ref. Range 10/18/2018 08:09 10/18/2018 11:52 GLUCOSE,FAST - POC Latest Ref Range: 70 - 110 mg/dL 175 (H) 215 (H) Current A1C: Last A1c of 10.2% (8/17/2018). Pending result of A1c lab entered on 10/18/2018. Current hospital diabetes medications: 
Basal lantus insulin 5 units daily at bedtime. Correctional lispro insulin ACHS. Very resistant dose. Total daily dose insulin requirement previous day: 10/17/2018 Lantus: 5 units Lispro: 4 units TDD: 9 units of insulin Home diabetes medications: Patient reported on 10/18/2018: 
Lantus insulin 20 units daily at bedtime. Lispro insulin 4 units 3x daily before meals. Diet: Diabetic consistent carb regular. Goals:  Blood glucose will be within target range of  mg/dL by 10/21/2018. Education:  _X__  Refer to Diabetes Education Record: 10/18/2018 
           ___  Education not indicated at this time Doug Peguero RN [facility] Pager: [phone]

## 2018-10-18 NOTE — CONSULTS
Infectious Disease Consultation Note    Requested by: Dr. Kirit tucker    Reason: right heel infection     Current abx Prior abx         Lines:       Assessment :    76 y. o. female who has a h/o  uncontrolled DM2 (last hgbA1C 8.7 on 10/18/18), HTN, CHF, CAD and paraplegia who presented to ed on 10/17/18 due to worsening wound on her right heel.           hospitalization (4/20/17-5/15/17) for Sepsis  secondary to group B streptococcus bloodstream infection (positive blood cultures 4/20, negative blood cultures 4/21). Most likely source of bloodstream infection is infected right psoas hematoma/abscess. 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        bilateral LE dvt - s/p IVC filter placement 5/11     Hospitalization 8/2018 for infected right heel ulcer, acute osteomyelitis right calcaneum. S/p Radical debridement of necrotic tissue and debridement of heel bone.  Wound irrigation, application of wound VAC on 8/20/18. Intra op cultures negative. CRP 2.4 on 8/21/18    Now with non healing right heel ulcer. Highly complex clinical picture. I agree that clinical presentation is concerning for chronic osteomyelitis right calcaneum. However, after obtaining detailed history/exam/review of labs; I am not sure if the non healing ulcer is due to indolent infection versus non infectious etiologies - edema due to chf interfering with healing, constant pressure. Patient's prior op cultures 8/2018 were negative which could have been due to prior abx versus non infectious etiology. Hence, this time I would recommend to hold off antibiotics, obtain bone biopsy & intra op culture to determine further plan of care. Wound cultures 10/17- gram negative rods - could be colonizer.      Recommendations:      1. hold off abx in anticipation of surgery  2. I&D right heel per podiatry.  Obtain bone biopsy intra op - send tissue for histology, cultures  3. Obtain baseline esr, cpr         Advance Care planning: full code: discussed  with patient/surrogate decision maker:Tomi Gonzalez: [phone]   Above plan was discussed in details with patient, dr. Saud Meyers and dr Ja Stroud. Please call me if any further questions or concerns. Will continue to participate in the care of this patient.      thank you for consultation request.       HPI:    76 y. o. female who has a h/o  uncontrolled DM2 (last hgbA1C 8.7 on 10/18/18), HTN, CHF, CAD and paraplegia who presented to ed on 10/17/18 due to worsening wound on her right heel.      Patient is known to me from prior inpatient consultation at [facility]. She had h/o sepsis due to group strep bacteremia, infected right psoas hematoma/abscess with subsequent paraplegia since 4/20/17 due to spinal cord infarct, septic thrombophlebitis. She has been wheelchair bound since then. She noted a small ulcer right heel since December. Pt mentions she developed the wound from hitting her foot on a curb when she was getting fitted for a wheel chair. She says the wound on her R heel has been hard to heal with occasional foul smelling discharge. She was followed up by dr. Anna Mendez (podiatrist) and was managed with wound care, abx. She came to ed with foul smelling drainage right heel, chills on 8/16/18. X ray right heel didn't reveal evidence of osteomyelitis. She was evaluated by podiatrist. Tonya Joaquin Radical debridement of necrotic tissue and debridement of heel bone.  Wound irrigation, application of wound VAC on 8/20/18. Intra op cultures negative. I was consulted for further recommendations. she was treated with 4 weeks of oral antibiotics since there was no definitive evidence of osteomyelitis. Patient couldn't tolerate her pills despite benadryl and missed many doses. Her wound was gradually healing up until a week ago when she started having increased right foot/leg swelling, drainage.  She was seen by dr. Mayra Levi and admitted for I&D. I have been consulted for further recommendations.      Patient denies headaches, visual disturbances, sore throat, runny nose, earaches, cp, sob, cough, abdominal pain, diarrhea, burning micturition, or weakness in extremities. she denies back pain/flank pain. prior h/o MRSA colonization/ infection in the past.             Past Medical History:   Diagnosis Date    Acute paraplegia ([facility] 4/20/2017    Benign hypertensive heart disease with systolic CHF, NYHA class 2 ([address] 9/5/2012    Biventricular implantable cardioverter-defibrillator in situ 04/28/2005    Upgraded to BiV AICD; gen change 4/2008; pocket revision 10/2009; Abdominal - done on 8/22/2012 by Dr. Lanning Severin Cardiac cath 08/15/1996    Patent coronaries. Elev LVEDP. EF 50-55%.  Cardiac echocardiogram 06/23/2015    Ltd study. EF 45-50%. Mild, diffuse hypk. Severe apical hypk. No mass or thrombus was clearly identified, although imaging was suboptimal.      Cardiac nuclear imaging test 06/19/2015    Fixed distal apical, distal septal defect more likely due to RV pacing than prior infarct. No ischemia. EF 46%. RWMA c/w RV pacing. Nondiagnostic EKG on pharm stress test.      Cardiovascular lower extremity venous duplex 09/04/2012    Acute, non-occlusive DVT in CFV on right. No DVT on left. No superficial thrombosis bilaterally.  Cardiovascular upper extremity venous duplex 08/27/2012    DVT in axillary vein on left. Left subclavian was not visualized.     Chronic anemia 9/5/2012    Chronic systolic heart failure (HCC)     Decreased calculated glomerular filtration rate (GFR) 3/30/2017    Calculated GFR equivalent to that of CKD stage 3 = 30-59 ml/min    Diabetic neuropathy associated with type 2 diabetes mellitus ([facility] 6/28/2011    Difficult airway for intubation 08/22/2012    see anesthesia airway note    Dyslipidemia 6/28/2011    Gout     History of complete heart block 6/28/2011    History of Coumadin therapy     Anticoagulation for DVT of the LUE; Discontinued on 3/30/2017    History of deep venous thrombosis 9/5/2012    Left upper extremity    History of pyelonephritis 3/30/2017    Left bundle branch block (LBBB) on electrocardiogram 6/28/2011    Nonischemic cardiomyopathy ([facility] 6/28/2011    Obesity (BMI 35.0-39.9 without comorbidity) 3/13/2017    Obstructive sleep apnea on CPAP 2/7/2012    Psoas abscess, right ([address] 4/20/2017    Psoas hematoma, right, secondary to anticoagulant therapy 3/30/2017    Type 2 diabetes mellitus with diabetic neuropathy ([facility] 6/28/2011       Past Surgical History:   Procedure Laterality Date    HX CARPAL TUNNEL RELEASE  4/07    right     HX CHOLECYSTECTOMY  1994    HX HYSTERECTOMY  1973    HX OTHER SURGICAL  6/11/2012    AICD revision    HX PACEMAKER  4/28/2005    [employer] AICD          Medication List      ASK your doctor about these medications    carvedilol 6.25 mg tablet  Commonly known as:  COREG     cholecalciferol (VITAMIN D3) 5,000 unit Tab tablet  Commonly known as:  VITAMIN D3     famotidine 20 mg tablet  Commonly known as:  PEPCID  Take 1 Tab by mouth nightly. ferrous sulfate 325 mg (65 mg iron) tablet  Take 1 Tab by mouth two (2) times daily (with meals). folic acid 1 mg tablet  Commonly known as:  FOLVITE  Take 1 Tab by mouth daily. furosemide 40 mg tablet  Commonly known as:  LASIX  1 tab daily  Indications: Edema     gabapentin 300 mg capsule  Commonly known as:  NEURONTIN  Take 2 Caps by mouth two (2) times a day. Indications: NEUROPATHIC PAIN     insulin glargine 100 unit/mL injection  Commonly known as:  LANTUS U-100 INSULIN  5 Units by SubCUTAneous route nightly.  Indications: type 2 diabetes mellitus     insulin lispro 100 unit/mL injection  Commonly known as:  HUMALOG  See sliding scale     JANUVIA 50 mg tablet  Generic drug:  SITagliptin     Lactobacillus Acidoph & Bulgar 1 million cell Tab tablet  Commonly known as:  FLORANEX  Take 1 Tab by mouth two (2) times a day. potassium chloride 20 mEq tablet  Commonly known as:  K-DUR, KLOR-CON  Take 1 Tab by mouth two (2) times a day. pravastatin 40 mg tablet  Commonly known as:  PRAVACHOL            Current Facility-Administered Medications   Medication Dose Route Frequency    sodium hypochlorite (QUARTER STRENGTH DAKIN'S) 0.125% irrigation (bottle)   Topical BID    carvedilol (COREG) tablet 6.25 mg  6.25 mg Oral BID WITH MEALS    famotidine (PEPCID) tablet 20 mg  20 mg Oral QHS    ferrous sulfate tablet 325 mg  325 mg Oral BID WITH MEALS    folic acid (FOLVITE) tablet 1 mg  1 mg Oral DAILY    gabapentin (NEURONTIN) capsule 600 mg  600 mg Oral BID    insulin glargine (LANTUS) injection 5 Units  5 Units SubCUTAneous QHS    Lactobacillus Acidoph & Bulgar (FLORANEX) tablet 1 Tab  1 Tab Oral BID    pravastatin (PRAVACHOL) tablet 40 mg  40 mg Oral QHS    SITagliptin (JANUVIA) tablet 50 mg  50 mg Oral DAILY    ondansetron (ZOFRAN) injection 4 mg  4 mg IntraVENous Q4H PRN    heparin (porcine) injection 5,000 Units  5,000 Units SubCUTAneous Q8H    insulin lispro (HUMALOG) injection   SubCUTAneous AC&HS    glucose chewable tablet 16 g  16 g Oral PRN    glucagon (GLUCAGEN) injection 1 mg  1 mg IntraMUSCular PRN    dextrose (D50W) injection syrg 12.5-25 g  25-50 mL IntraVENous PRN       Allergies: Vancomycin; Ampicillin; Bactrim [sulfamethoxazole-trimethoprim]; Blueberry; Ciprofloxacin; Codeine; Crestor [rosuvastatin]; Darvocet a500 [propoxyphene n-acetaminophen]; Demerol [meperidine]; Levaquin [levofloxacin]; Lipitor [atorvastatin]; Magnesium oxide; Minocin [minocycline]; Pcn [penicillins]; Pravachol [pravastatin]; Shellfish derived; Sulfa (sulfonamide antibiotics); Ultracet [tramadol-acetaminophen]; Vicodin [hydrocodone-acetaminophen];  Vytorin 10-10 [ezetimibe-simvastatin]; and Percodan [oxycodone hcl-oxycodone-asa]    Family History   Problem Relation Age of Onset    Cancer Father         Leukemia     Social History     Socioeconomic History    Marital status:      Spouse name: Not on file    Number of children: Not on file    Years of education: Not on file    Highest education level: Not on file   Social Needs    Financial resource strain: Not on file    Food insecurity - worry: Not on file    Food insecurity - inability: Not on file   [employer] needs - medical: Not on file   [employer] needs - non-medical: Not on file   Occupational History    Not on file   Tobacco Use    Smoking status: Never Smoker    Smokeless tobacco: Never Used   Substance and Sexual Activity    Alcohol use: No    Drug use: No    Sexual activity: Yes     Partners: Male   Other Topics Concern    Not on file   Social History Narrative    Not on file     Social History     Tobacco Use   Smoking Status Never Smoker   Smokeless Tobacco Never Used        Temp (24hrs), Av °F (36.7 °C), Min:97.2 °F (36.2 °C), Max:98.4 °F (36.9 °C)    Visit Vitals  /68 (BP 1 Location: Left arm, BP Patient Position: At rest)   Pulse 74   Temp 98.1 °F (36.7 °C)   Resp 18   Ht 5' 7\" (1.702 m)   Wt 111.6 kg (246 lb)   SpO2 98%   Breastfeeding? No   BMI 38.53 kg/m²       ROS: 12 point ROS obtained in details. Pertinent positives as mentioned in HPI,   otherwise negative    Physical Exam:    General: Well developed, well nourished female laying on the bed, AAOx3 NAD      General:  awake alert and oriented   HEENT:  Normocephalic, atraumatic, PERRL, EOMI, no scleral icterus or pallor; no conjunctival hemmohage; nasal and oral mucous are moist and without evidence of lesions. Neck supple, no bruits.    Lymph Nodes:  no cervical, axillary or inguinal adenopathy   Lungs:  non-labored, bilaterally clear to auscultation- no crackles wheezes rales or rhonchi   Heart:  s1 and s2 irregular; no rubs or gallops, no edema, + pedal pulses   Abdomen: soft, non-distended, active bowel sounds, no hepatomegaly, no splenomegaly. no tenderness over the left abdominal pacemaker, no overlying erythema or fluctuance,no tenderness   Genitourinary: deferred   Extremities:  no clubbing, cyanosis; ulcer posterior right heel without surrounding erythema, edema right leg/foot,no purulent drainage   Neurologic:  No gross focal sensory abnormalities; 5/5 muscle strength to upper extremities. 1/5 strength in lower extremities. Cranial nerves intact   Skin:  Surgical scars abdomen, left neck well healed   Back: no paraspinal muscle guarding or rigidity, no spinal or paraspinal tenderness   Psychiatric:  No suicidal or homicidal ideations, appropriate mood and affect                  Labs: Results:   Chemistry Recent Labs     10/18/18  0050 10/17/18  1211   * 140*    136   K 3.4* 4.0    104   CO2 26 24   BUN 25* 24*   CREA 1.35* 1.39*   CA 8.5 9.0   AGAP 9 8   BUCR 19 17   AP  --  120*   TP  --  8.4*   ALB  --  3.0*   GLOB  --  5.4*   AGRAT  --  0.6*      CBC w/Diff Recent Labs     10/18/18  0050 10/17/18  1211   WBC 6.4 7.5   RBC 3.62* 4.17*   HGB 10.0* 11.7*   HCT 30.4* 34.2*    241   GRANS 58 65   LYMPH 33 27   EOS 3 2      Microbiology Recent Labs     10/17/18  1845 10/17/18  1830 10/17/18  1215 10/17/18  1200   CULT MRSA target DNA is detected (presumptive positive for MRSA colonization). *  CALLED TO Vania Scherer RN 4N AT [phone]. REPEATED TO [phone].  FEW GRAM NEGATIVE RODS*  CULTURE IN PROGRESS,FURTHER UPDATES TO FOLLOW NO GROWTH AFTER 18 HOURS NO GROWTH AFTER 18 HOURS          RADIOLOGY:    All available imaging studies/reports in [facility] for this admission were reviewed    Dr. Bev Tejeda, Infectious Disease Specialist  [phone]  October 18, 2018  1:58 PM

## 2018-10-18 NOTE — INTERDISCIPLINARY ROUNDS
Interdisciplinary Round Note Patient Information: 
 Isa Escalante 
 [birth year] Reason for Admission: Heel ulcer ([address] Attending Provider:   Hanna Courtney Primary Care Physician: 
     Milo Rose MD 
     [phone] Estimated discharge date:  10/20/2018 Hospital day: 1 
[unfilled] 
- - - 
RRAT Score: High Risk   
      
 30 Total Score 3 Has Seen PCP in Last 6 Months (Yes=3, No=0)  
 4 IP Visits Last 12 Months (1-3=4, 4=9, >4=11) 5 Pt. Coverage (Medicare=5 , Medicaid, or Self-Pay=4) 18 Charlson Comorbidity Score (Age + Comorbid Conditions) Criteria that do not apply:  
 . Living with Significant Other. Assisted Living. LTAC. SNF. or  
Rehab Patient Length of Stay (>5 days = 3)  
   
non tele No 
none Other none {VTE Prophylaxis:72093} Lines, Drains, & Airways Krueger IV Antibiotics:   
Current Antimicrobial Therapy (168h ago, onward) None GI Prophylaxis: GI Prophylaxis: no 
 Type:  Recent Glucose Results:  
Lab Results Component Value Date/Time  (H) 10/18/2018 12:50 AM  
  (H) 10/17/2018 12:11 PM  
 GLUCPOC 175 (H) 10/18/2018 08:09 AM  
 GLUCPOC 226 (H) 10/17/2018 09:44 PM  
 GLUCPOC 162 (H) 10/17/2018 05:19 PM  
  
Activity Level: Activity Level: Chair Needs assistance with ADLs: no 
 
 
 Goals for Today: Krueger care, accu checks AC & HS, maintained contact isolation, Recommendations:  
Discharge Disposition: Home with home health PT 
P.T and wound care Care Management involvement for home health follow up for:  wound care and mobility Needs for Discharge: wound care,  IDR Team: nurse and  
Recommendations from IDR team: *** Other Notes: ***

## 2018-10-18 NOTE — WOUND CARE
Physical Exam  
Room 462: pt to be seen by Dr. Archie Simpson for podiatry services.  
Odell GARZON, RN, [address], [address] [address]

## 2018-10-18 NOTE — PROGRESS NOTES
Bedside and Verbal shift change report given to Ina Simental RN (oncoming nurse) by Jayce Hamlin RN 
 (offgoing nurse). Report given with SBAR, Elida, MAR and Recent Results.

## 2018-10-18 NOTE — CONSULTS
Consult    Patient: Anthony Howard MRN: [id number]  SSN: [id number]    YOB: 1950  Age: 76 y.o. Sex: female      Subjective:      Anthony Howard is a 76 y.o. female who is being seen for asked to evaluate and treat ulcer and infection right heel. .    Past Medical History:   Diagnosis Date    [facility] 4/20/2017    Benign hypertensive heart disease with systolic CHF, NYHA class 2 ([facility] 9/5/2012    Biventricular implantable cardioverter-defibrillator in situ 04/28/2005    Upgraded to BiV AICD; gen change 4/2008; pocket revision 10/2009; Abdominal - done on 8/22/2012 by Dr. Tamar Angel Cardiac cath 08/15/1996    Patent coronaries. Elev LVEDP. EF 50-55%.  Cardiac echocardiogram 06/23/2015    Ltd study. EF 45-50%. Mild, diffuse hypk. Severe apical hypk. No mass or thrombus was clearly identified, although imaging was suboptimal.      Cardiac nuclear imaging test 06/19/2015    Fixed distal apical, distal septal defect more likely due to RV pacing than prior infarct. No ischemia. EF 46%. RWMA c/w RV pacing. Nondiagnostic EKG on pharm stress test.      Cardiovascular lower extremity venous duplex 09/04/2012    Acute, non-occlusive DVT in CFV on right. No DVT on left. No superficial thrombosis bilaterally.  Cardiovascular upper extremity venous duplex 08/27/2012    DVT in axillary vein on left. Left subclavian was not visualized.     Chronic anemia 9/5/2012    Chronic systolic heart failure (HCC)     Decreased calculated glomerular filtration rate (GFR) 3/30/2017    Calculated GFR equivalent to that of CKD stage 3 = 30-59 ml/min    Diabetic neuropathy associated with type 2 diabetes mellitus ([facility] 6/28/2011    Difficult airway for intubation 08/22/2012    see anesthesia airway note    Dyslipidemia 6/28/2011    Gout     History of complete heart block 6/28/2011    History of Coumadin therapy     Anticoagulation for DVT of the LUE; Discontinued on 3/30/2017  History of deep venous thrombosis 9/5/2012    Left upper extremity    History of pyelonephritis 3/30/2017    Left bundle branch block (LBBB) on electrocardiogram 6/28/2011    [facility]ischemic [facility] 6/28/2011    Obesity (BMI 35.0-39.9 without comorbidity) 3/13/2017    Obstructive sleep apnea on CPAP 2/7/2012    [facility]so[facility] abscess, right [facility] 4/20/2017    Psoas hematoma, right, secondary to anticoagulant therapy 3/30/2017    Type 2 diabetes mellitus with diabetic neuropathy ([facility] 6/28/2011     Past Surgical History:   Procedure Laterality Date    HX CARPAL TUNNEL RELEASE  4/07    right     HX CHOLECYSTECTOMY  1994    HX HYSTERECTOMY  1973    HX OTHER SURGICAL  6/11/2012    AICD revision    HX PACEMAKER  4/28/2005    [employer] AICD      Family History   Problem Relation Age of Onset    Cancer Father         Leukemia     Social History     Tobacco Use    Smoking status: Never Smoker    Smokeless tobacco: Never Used   Substance Use Topics    Alcohol use: No      Current Facility-Administered Medications   Medication Dose Route Frequency Provider Last Rate Last Dose    carvedilol (COREG) tablet 6.25 mg  6.25 mg Oral BID WITH MEALS Frantz Matute MD   6.25 mg at 10/18/18 0835    famotidine (PEPCID) tablet 20 mg  20 mg Oral QHS Frantz Matute MD   20 mg at 10/17/18 2159    ferrous sulfate tablet 325 mg  325 mg Oral BID WITH MEALS Frantz Matute MD   325 mg at 04/25/36 6213    folic acid (FOLVITE) tablet 1 mg  1 mg Oral DAILY Frantz Matute MD   1 mg at 10/18/18 9154    gabapentin (NEURONTIN) capsule 600 mg  600 mg Oral BID Frantz Matute MD   600 mg at 10/18/18 0835    insulin glargine (LANTUS) injection 5 Units  5 Units SubCUTAneous QHS Frantz Matute MD   5 Units at 10/17/18 2200    Lactobacillus Acidoph & Stephanie MENDEZ [facility]) tablet 1 Tab  1 Tab Oral BID Frantz Matute MD   1 Tab at 10/18/18 0834    pravastatin (PRAVACHOL) tablet 40 mg  40 mg Oral QHS Farntz Matute MD   40 mg at 10/17/18 2159    SITagliptin (JANUVIA) tablet 50 mg  50 mg Oral DAILY Nano Whitehead MD   50 mg at 10/18/18 0835    ondansetron (ZOFRAN) injection 4 mg  4 mg IntraVENous Q4H PRN Nano Whitehead MD        heparin (porcine) injection 5,000 Units  5,000 Units SubCUTAneous Q8H Nano Whitehead MD   5,000 Units at 10/18/18 0524    insulin lispro (HUMALOG) injection   SubCUTAneous AC&HS Miko Forutne MD   2 Units at 10/18/18 6601    glucose chewable tablet 16 g  16 g Oral PRN Nano Whitehead MD        glucagon (GLUCAGEN) injection 1 mg  1 mg IntraMUSCular PRN Nano Whitehead MD        dextrose (D50W) injection syrg 12.5-25 g  25-50 mL IntraVENous PRN Nano Whitehead MD            Allergies   Allergen Reactions    Vancomycin Itching    Ampicillin Itching    Bactrim [Sulfamethoxazole-Trimethoprim] Unknown (comments)    Blueberry Swelling     Causes throat swelling    Ciprofloxacin Itching    Codeine Other (comments)     Jumpy feeling    Crestor [Rosuvastatin] Itching    Darvocet A500 [Propoxyphene N-Acetaminophen] Itching    Demerol [Meperidine] Itching    Levaquin [Levofloxacin] Itching    Lipitor [Atorvastatin] Myalgia    Magnesium Oxide Itching     nausea    Minocin [Minocycline] Unknown (comments)    Pcn [Penicillins] Itching    Pravachol [Pravastatin] Swelling     Swelling in mouth. Not allergic per patient, takes at home    Shellfish Derived Unknown (comments)    Sulfa (Sulfonamide Antibiotics) Itching    Ultracet [Tramadol-Acetaminophen] Itching    Vicodin [Hydrocodone-Acetaminophen] Unknown (comments)    Vytorin 10-10 [Ezetimibe-Simvastatin] Myalgia    Percodan [Oxycodone Hcl-Oxycodone-Asa] Itching       Review of Systems:  A comprehensive review of systems was negative except for that written in the History of Present Illness.     Objective:     Vitals:    10/18/18 0125 10/18/18 0320 10/18/18 0803 10/18/18 1122   BP: 136/78 125/73 157/83 119/68   Pulse: 68 78 76 74   Resp: 17 18 18 18   Temp: 98.4 °F (36.9 °C) 98.2 °F (36.8 °C) 98.1 °F (36.7 °C) 98.1 °F (36.7 °C)   SpO2: 99% 97% 99% 98%   Weight:       Height:            Physical Exam:  Seen at bedside  Awake and alert. Right heel open posterior heel wound. Bone exposure, drainage, necrotic tissue. X-ray shows lytic bone process. Assessment:     Hospital Problems  Date Reviewed: 10/18/2018          Codes Class Noted POA    [facility] ICD-10-CM: L97.409  ICD-9-CM: 707.14  10/17/2018 Unknown              Plan:     Neuropathic ulcer and osteitis. Plan debridement and wound vac.      Signed By: Holli Tierney DPM     October 18, 2018

## 2018-10-19 ENCOUNTER — ANESTHESIA (OUTPATIENT)
Dept: SURGERY | Age: 68
DRG: 629 | End: 2018-10-19
Payer: COMMERCIAL

## 2018-10-19 LAB
ANION GAP SERPL CALC-SCNC: 5 MMOL/L (ref 3–18)
BASOPHILS # BLD: 0 K/UL (ref 0–0.1)
BASOPHILS NFR BLD: 1 % (ref 0–2)
BUN SERPL-MCNC: 25 MG/DL (ref 7–18)
BUN/CREAT SERPL: 20 (ref 12–20)
CALCIUM SERPL-MCNC: 8.3 MG/DL (ref 8.5–10.1)
CHLORIDE SERPL-SCNC: 109 MMOL/L (ref 100–108)
CO2 SERPL-SCNC: 28 MMOL/L (ref 21–32)
CREAT SERPL-MCNC: 1.26 MG/DL (ref 0.6–1.3)
CRP SERPL-MCNC: 2.4 MG/DL (ref 0–0.3)
DIFFERENTIAL METHOD BLD: ABNORMAL
EOSINOPHIL # BLD: 0.2 K/UL (ref 0–0.4)
EOSINOPHIL NFR BLD: 4 % (ref 0–5)
ERYTHROCYTE [DISTWIDTH] IN BLOOD BY AUTOMATED COUNT: 14.5 % (ref 11.6–14.5)
GLUCOSE BLD STRIP.AUTO-MCNC: 109 MG/DL (ref 70–110)
GLUCOSE BLD STRIP.AUTO-MCNC: 119 MG/DL (ref 70–110)
GLUCOSE BLD STRIP.AUTO-MCNC: 121 MG/DL (ref 70–110)
GLUCOSE BLD STRIP.AUTO-MCNC: 130 MG/DL (ref 70–110)
GLUCOSE BLD STRIP.AUTO-MCNC: 137 MG/DL (ref 70–110)
GLUCOSE BLD STRIP.AUTO-MCNC: 157 MG/DL (ref 70–110)
GLUCOSE SERPL-MCNC: 130 MG/DL (ref 74–99)
HCT VFR BLD AUTO: 31.7 % (ref 35–45)
HGB BLD-MCNC: 10.5 G/DL (ref 12–16)
LYMPHOCYTES # BLD: 1.6 K/UL (ref 0.9–3.6)
LYMPHOCYTES NFR BLD: 33 % (ref 21–52)
MCH RBC QN AUTO: 27.8 PG (ref 24–34)
MCHC RBC AUTO-ENTMCNC: 33.1 G/DL (ref 31–37)
MCV RBC AUTO: 83.9 FL (ref 74–97)
MONOCYTES # BLD: 0.3 K/UL (ref 0.05–1.2)
MONOCYTES NFR BLD: 6 % (ref 3–10)
NEUTS SEG # BLD: 2.8 K/UL (ref 1.8–8)
NEUTS SEG NFR BLD: 56 % (ref 40–73)
PLATELET # BLD AUTO: 199 K/UL (ref 135–420)
PMV BLD AUTO: 9.5 FL (ref 9.2–11.8)
POTASSIUM SERPL-SCNC: 3.9 MMOL/L (ref 3.5–5.5)
RBC # BLD AUTO: 3.78 M/UL (ref 4.2–5.3)
SODIUM SERPL-SCNC: 142 MMOL/L (ref 136–145)
WBC # BLD AUTO: 4.9 K/UL (ref 4.6–13.2)

## 2018-10-19 PROCEDURE — 88311 DECALCIFY TISSUE: CPT | Performed by: PODIATRIST

## 2018-10-19 PROCEDURE — 76010000138 HC OR TIME 0.5 TO 1 HR: Performed by: PODIATRIST

## 2018-10-19 PROCEDURE — 87075 CULTR BACTERIA EXCEPT BLOOD: CPT | Performed by: PODIATRIST

## 2018-10-19 PROCEDURE — 74011250637 HC RX REV CODE- 250/637: Performed by: INTERNAL MEDICINE

## 2018-10-19 PROCEDURE — 74011250636 HC RX REV CODE- 250/636: Performed by: INTERNAL MEDICINE

## 2018-10-19 PROCEDURE — 77030019952 HC CANSTR VAC ASST KCON -B: Performed by: PODIATRIST

## 2018-10-19 PROCEDURE — 77030018836 HC SOL IRR NACL ICUM -A: Performed by: PODIATRIST

## 2018-10-19 PROCEDURE — 0QBL0ZX EXCISION OF RIGHT TARSAL, OPEN APPROACH, DIAGNOSTIC: ICD-10-PCS | Performed by: PODIATRIST

## 2018-10-19 PROCEDURE — 80048 BASIC METABOLIC PNL TOTAL CA: CPT | Performed by: NURSE PRACTITIONER

## 2018-10-19 PROCEDURE — 36415 COLL VENOUS BLD VENIPUNCTURE: CPT | Performed by: NURSE PRACTITIONER

## 2018-10-19 PROCEDURE — 77030019934 HC DRSG VAC ASST KCON -B: Performed by: PODIATRIST

## 2018-10-19 PROCEDURE — 86140 C-REACTIVE PROTEIN: CPT | Performed by: INTERNAL MEDICINE

## 2018-10-19 PROCEDURE — 74011250636 HC RX REV CODE- 250/636: Performed by: NURSE ANESTHETIST, CERTIFIED REGISTERED

## 2018-10-19 PROCEDURE — 88307 TISSUE EXAM BY PATHOLOGIST: CPT | Performed by: PODIATRIST

## 2018-10-19 PROCEDURE — 0QBL0ZZ EXCISION OF RIGHT TARSAL, OPEN APPROACH: ICD-10-PCS | Performed by: PODIATRIST

## 2018-10-19 PROCEDURE — 87116 MYCOBACTERIA CULTURE: CPT | Performed by: INTERNAL MEDICINE

## 2018-10-19 PROCEDURE — 74011250636 HC RX REV CODE- 250/636: Performed by: PODIATRIST

## 2018-10-19 PROCEDURE — 76210000006 HC OR PH I REC 0.5 TO 1 HR: Performed by: PODIATRIST

## 2018-10-19 PROCEDURE — 85025 COMPLETE CBC W/AUTO DIFF WBC: CPT | Performed by: NURSE PRACTITIONER

## 2018-10-19 PROCEDURE — 87102 FUNGUS ISOLATION CULTURE: CPT | Performed by: INTERNAL MEDICINE

## 2018-10-19 PROCEDURE — 77030013179 HC SHOE PSTOP OPN DJOR -A: Performed by: PODIATRIST

## 2018-10-19 PROCEDURE — 74011250636 HC RX REV CODE- 250/636

## 2018-10-19 PROCEDURE — 88304 TISSUE EXAM BY PATHOLOGIST: CPT

## 2018-10-19 PROCEDURE — 87077 CULTURE AEROBIC IDENTIFY: CPT | Performed by: PODIATRIST

## 2018-10-19 PROCEDURE — 65270000029 HC RM PRIVATE

## 2018-10-19 PROCEDURE — 87186 SC STD MICRODIL/AGAR DIL: CPT | Performed by: PODIATRIST

## 2018-10-19 PROCEDURE — 77030020782 HC GWN BAIR PAWS FLX 3M -B: Performed by: PODIATRIST

## 2018-10-19 PROCEDURE — 87070 CULTURE OTHR SPECIMN AEROBIC: CPT | Performed by: PODIATRIST

## 2018-10-19 PROCEDURE — 74011000250 HC RX REV CODE- 250: Performed by: INTERNAL MEDICINE

## 2018-10-19 PROCEDURE — 87076 CULTURE ANAEROBE IDENT EACH: CPT | Performed by: PODIATRIST

## 2018-10-19 PROCEDURE — 74011250637 HC RX REV CODE- 250/637: Performed by: NURSE ANESTHETIST, CERTIFIED REGISTERED

## 2018-10-19 PROCEDURE — 76060000032 HC ANESTHESIA 0.5 TO 1 HR: Performed by: PODIATRIST

## 2018-10-19 PROCEDURE — 74011000250 HC RX REV CODE- 250: Performed by: PODIATRIST

## 2018-10-19 PROCEDURE — 74011636637 HC RX REV CODE- 636/637: Performed by: INTERNAL MEDICINE

## 2018-10-19 PROCEDURE — 82962 GLUCOSE BLOOD TEST: CPT

## 2018-10-19 RX ORDER — LIDOCAINE HYDROCHLORIDE 10 MG/ML
INJECTION, SOLUTION EPIDURAL; INFILTRATION; INTRACAUDAL; PERINEURAL AS NEEDED
Status: DISCONTINUED | OUTPATIENT
Start: 2018-10-19 | End: 2018-10-19 | Stop reason: HOSPADM

## 2018-10-19 RX ORDER — SODIUM CHLORIDE, SODIUM LACTATE, POTASSIUM CHLORIDE, CALCIUM CHLORIDE 600; 310; 30; 20 MG/100ML; MG/100ML; MG/100ML; MG/100ML
75 INJECTION, SOLUTION INTRAVENOUS CONTINUOUS
Status: DISCONTINUED | OUTPATIENT
Start: 2018-10-19 | End: 2018-10-19 | Stop reason: HOSPADM

## 2018-10-19 RX ORDER — DEXTROSE 50 % IN WATER (D50W) INTRAVENOUS SYRINGE
25-50 AS NEEDED
Status: DISCONTINUED | OUTPATIENT
Start: 2018-10-19 | End: 2018-10-19 | Stop reason: HOSPADM

## 2018-10-19 RX ORDER — PROPOFOL 10 MG/ML
INJECTION, EMULSION INTRAVENOUS
Status: DISCONTINUED | OUTPATIENT
Start: 2018-10-19 | End: 2018-10-19 | Stop reason: HOSPADM

## 2018-10-19 RX ORDER — SODIUM CHLORIDE 0.9 % (FLUSH) 0.9 %
5-10 SYRINGE (ML) INJECTION AS NEEDED
Status: DISCONTINUED | OUTPATIENT
Start: 2018-10-19 | End: 2018-10-19 | Stop reason: HOSPADM

## 2018-10-19 RX ORDER — NEOMYCIN AND POLYMYXIN B SULFATES 40; 200000 MG/ML; [USP'U]/ML
SOLUTION IRRIGATION AS NEEDED
Status: DISCONTINUED | OUTPATIENT
Start: 2018-10-19 | End: 2018-10-19 | Stop reason: HOSPADM

## 2018-10-19 RX ORDER — LINEZOLID 2 MG/ML
600 INJECTION, SOLUTION INTRAVENOUS EVERY 12 HOURS
Status: DISCONTINUED | OUTPATIENT
Start: 2018-10-19 | End: 2018-10-22 | Stop reason: ALTCHOICE

## 2018-10-19 RX ORDER — LIDOCAINE HYDROCHLORIDE 10 MG/ML
0.1 INJECTION, SOLUTION EPIDURAL; INFILTRATION; INTRACAUDAL; PERINEURAL AS NEEDED
Status: DISCONTINUED | OUTPATIENT
Start: 2018-10-19 | End: 2018-10-19 | Stop reason: HOSPADM

## 2018-10-19 RX ORDER — MAGNESIUM SULFATE 100 %
4 CRYSTALS MISCELLANEOUS AS NEEDED
Status: DISCONTINUED | OUTPATIENT
Start: 2018-10-19 | End: 2018-10-19 | Stop reason: HOSPADM

## 2018-10-19 RX ORDER — KETOROLAC TROMETHAMINE 15 MG/ML
15 INJECTION, SOLUTION INTRAMUSCULAR; INTRAVENOUS ONCE
Status: COMPLETED | OUTPATIENT
Start: 2018-10-19 | End: 2018-10-19

## 2018-10-19 RX ORDER — INSULIN LISPRO 100 [IU]/ML
INJECTION, SOLUTION INTRAVENOUS; SUBCUTANEOUS ONCE
Status: DISCONTINUED | OUTPATIENT
Start: 2018-10-19 | End: 2018-10-19 | Stop reason: HOSPADM

## 2018-10-19 RX ORDER — LIDOCAINE HYDROCHLORIDE 20 MG/ML
INJECTION, SOLUTION EPIDURAL; INFILTRATION; INTRACAUDAL; PERINEURAL AS NEEDED
Status: DISCONTINUED | OUTPATIENT
Start: 2018-10-19 | End: 2018-10-19 | Stop reason: HOSPADM

## 2018-10-19 RX ORDER — FAMOTIDINE 20 MG/1
20 TABLET, FILM COATED ORAL ONCE
Status: COMPLETED | OUTPATIENT
Start: 2018-10-19 | End: 2018-10-19

## 2018-10-19 RX ORDER — FENTANYL CITRATE 50 UG/ML
INJECTION, SOLUTION INTRAMUSCULAR; INTRAVENOUS AS NEEDED
Status: DISCONTINUED | OUTPATIENT
Start: 2018-10-19 | End: 2018-10-19 | Stop reason: HOSPADM

## 2018-10-19 RX ORDER — SODIUM CHLORIDE, SODIUM LACTATE, POTASSIUM CHLORIDE, CALCIUM CHLORIDE 600; 310; 30; 20 MG/100ML; MG/100ML; MG/100ML; MG/100ML
50 INJECTION, SOLUTION INTRAVENOUS CONTINUOUS
Status: DISCONTINUED | OUTPATIENT
Start: 2018-10-19 | End: 2018-10-19 | Stop reason: HOSPADM

## 2018-10-19 RX ORDER — ONDANSETRON 2 MG/ML
4 INJECTION INTRAMUSCULAR; INTRAVENOUS ONCE
Status: DISCONTINUED | OUTPATIENT
Start: 2018-10-19 | End: 2018-10-19 | Stop reason: HOSPADM

## 2018-10-19 RX ORDER — HYDROMORPHONE HYDROCHLORIDE 2 MG/ML
0.5 INJECTION, SOLUTION INTRAMUSCULAR; INTRAVENOUS; SUBCUTANEOUS AS NEEDED
Status: DISCONTINUED | OUTPATIENT
Start: 2018-10-19 | End: 2018-10-19 | Stop reason: HOSPADM

## 2018-10-19 RX ORDER — MIDAZOLAM HYDROCHLORIDE 1 MG/ML
INJECTION, SOLUTION INTRAMUSCULAR; INTRAVENOUS AS NEEDED
Status: DISCONTINUED | OUTPATIENT
Start: 2018-10-19 | End: 2018-10-19 | Stop reason: HOSPADM

## 2018-10-19 RX ADMIN — MIDAZOLAM HYDROCHLORIDE 1 MG: 1 INJECTION, SOLUTION INTRAMUSCULAR; INTRAVENOUS at 13:43

## 2018-10-19 RX ADMIN — DAKIN'S SOLUTION 0.125% (QUARTER STRENGTH): 0.12 SOLUTION at 10:01

## 2018-10-19 RX ADMIN — INSULIN GLARGINE 5 UNITS: 100 INJECTION, SOLUTION SUBCUTANEOUS at 21:53

## 2018-10-19 RX ADMIN — GABAPENTIN 600 MG: 300 CAPSULE ORAL at 17:39

## 2018-10-19 RX ADMIN — PROPOFOL 25 MCG/KG/MIN: 10 INJECTION, EMULSION INTRAVENOUS at 13:57

## 2018-10-19 RX ADMIN — LACTOBACILLUS TAB 1 TABLET: TAB at 08:31

## 2018-10-19 RX ADMIN — FOLIC ACID 1 MG: 1 TABLET ORAL at 08:32

## 2018-10-19 RX ADMIN — LACTOBACILLUS TAB 1 TABLET: TAB at 17:39

## 2018-10-19 RX ADMIN — PRAVASTATIN SODIUM 40 MG: 20 TABLET ORAL at 00:25

## 2018-10-19 RX ADMIN — FERROUS SULFATE TAB 325 MG (65 MG ELEMENTAL FE) 325 MG: 325 (65 FE) TAB at 17:40

## 2018-10-19 RX ADMIN — GABAPENTIN 600 MG: 300 CAPSULE ORAL at 08:31

## 2018-10-19 RX ADMIN — PRAVASTATIN SODIUM 40 MG: 20 TABLET ORAL at 21:30

## 2018-10-19 RX ADMIN — CARVEDILOL 6.25 MG: 6.25 TABLET, FILM COATED ORAL at 08:32

## 2018-10-19 RX ADMIN — SITAGLIPTIN 50 MG: 50 TABLET, FILM COATED ORAL at 08:31

## 2018-10-19 RX ADMIN — INSULIN GLARGINE 5 UNITS: 100 INJECTION, SOLUTION SUBCUTANEOUS at 02:54

## 2018-10-19 RX ADMIN — MIDAZOLAM HYDROCHLORIDE 1 MG: 1 INJECTION, SOLUTION INTRAMUSCULAR; INTRAVENOUS at 13:50

## 2018-10-19 RX ADMIN — FAMOTIDINE 20 MG: 20 TABLET, FILM COATED ORAL at 12:39

## 2018-10-19 RX ADMIN — LINEZOLID 600 MG: 600 INJECTION, SOLUTION INTRAVENOUS at 16:15

## 2018-10-19 RX ADMIN — LIDOCAINE HYDROCHLORIDE 100 MG: 20 INJECTION, SOLUTION EPIDURAL; INFILTRATION; INTRACAUDAL; PERINEURAL at 13:50

## 2018-10-19 RX ADMIN — FERROUS SULFATE TAB 325 MG (65 MG ELEMENTAL FE) 325 MG: 325 (65 FE) TAB at 08:32

## 2018-10-19 RX ADMIN — KETOROLAC TROMETHAMINE 15 MG: 15 INJECTION, SOLUTION INTRAMUSCULAR; INTRAVENOUS at 02:50

## 2018-10-19 RX ADMIN — SODIUM CHLORIDE, SODIUM LACTATE, POTASSIUM CHLORIDE, AND CALCIUM CHLORIDE 50 ML/HR: 600; 310; 30; 20 INJECTION, SOLUTION INTRAVENOUS at 12:39

## 2018-10-19 RX ADMIN — WATER 2 G: 1 INJECTION INTRAMUSCULAR; INTRAVENOUS; SUBCUTANEOUS at 19:03

## 2018-10-19 RX ADMIN — CARVEDILOL 6.25 MG: 6.25 TABLET, FILM COATED ORAL at 17:40

## 2018-10-19 RX ADMIN — FENTANYL CITRATE 25 MCG: 50 INJECTION, SOLUTION INTRAMUSCULAR; INTRAVENOUS at 13:57

## 2018-10-19 RX ADMIN — HEPARIN SODIUM 5000 UNITS: 5000 INJECTION, SOLUTION INTRAVENOUS; SUBCUTANEOUS at 21:29

## 2018-10-19 RX ADMIN — FAMOTIDINE 20 MG: 20 TABLET ORAL at 00:25

## 2018-10-19 NOTE — PERIOP NOTES
TRANSFER - OUT REPORT: 
 
Verbal report given to Paz SANCHEZ on Yonathan Bashir  being transferred to  for routine post - op Report consisted of patients Situation, Background, Assessment and  
Recommendations(SBAR). Information from the following report(s) SBAR and MAR was reviewed with the receiving nurse. Lines:  
Peripheral IV 10/18/18 Left Forearm (Active) Site Assessment Clean, dry, & intact 10/18/2018  7:35 PM  
Phlebitis Assessment 0 10/18/2018  7:35 PM  
Infiltration Assessment 0 10/18/2018  7:35 PM  
Dressing Status Clean, dry, & intact 10/18/2018  7:35 PM  
Dressing Type Transparent 10/18/2018  7:35 PM  
Hub Color/Line Status Blue;Patent; End cap changed; Flushed 10/18/2018  7:35 PM  
Action Taken Other (comment) 10/18/2018  7:35 PM  
Alcohol Cap Used No 10/18/2018  7:35 PM  
  
 
Opportunity for questions and clarification was provided. Patient transported with: 
 Registered Nurse

## 2018-10-19 NOTE — PROGRESS NOTES
Infectious Disease progress Note Requested by: Dr. Cydney Cervantes Reason: right heel infection Current abx Prior abx Lines:  
 
 
Assessment : 
 
76 y. o. female who has a h/o  uncontrolled DM2 (last hgbA1C 8.7 on 10/18/18), HTN, CHF, CAD and paraplegia who presented to ed on 10/17/18 due to worsening wound on her right heel.  
  
   
 hospitalization (4/20/17-5/15/17) for Sepsis  secondary to group B streptococcus bloodstream infection (positive blood cultures 4/20, negative blood cultures 4/21). Most likely source of bloodstream infection is infected right psoas hematoma/abscess. 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    
  
bilateral LE dvt - s/p IVC filter placement 5/11 
  
Hospitalization 8/2018 for infected right heel ulcer, acute osteomyelitis right calcaneum. S/p Radical debridement of necrotic tissue and debridement of heel bone.  Wound irrigation, application of wound VAC on 8/20/18. Intra op cultures negative. CRP 2.4 on 8/21/18 Now with non healing right heel ulcer. Highly complex clinical picture. I agree that clinical presentation is concerning for chronic osteomyelitis right calcaneum. However, after obtaining detailed history/exam/review of labs; I am not sure if the non healing ulcer is due to indolent infection versus non infectious etiologies - edema due to chf interfering with healing, constant pressure. Patient's prior op cultures 8/2018 were negative which could have been due to prior abx versus non infectious etiology. Hence, this time I would recommend to hold off antibiotics, obtain bone biopsy & intra op culture to determine further plan of care. Esr: 58 on 10/18. Crp: not done despite orders. Wound cultures 10/17- gram negative rods - could be colonizer. Urine cultures 10/17 reveals multiple gram negative rods likely colonizer. No s/s to suggest cystitis. Decreased edema right leg/right foot 
 
abx management highly complicated due to h/o multiple abx allergies including ampicillin/vancomycin. Has tolerated ceftriaxone in the past. Significant itching with vancomycin.  
  
Recommendations: 
   
1. hold off abx in anticipation of surgery - start cefepime, linezolid after surgery 2. I&D right heel per podiatry. Obtain bone biopsy intra op - send tissue for histology, afb/fungal/bacterial cultures - discussed with dr. Leidy Rodriguez. 3. Obtain baseline crp stat - d/w lab 
  
  
 Advance Care planning: full code: discussed  with patient/surrogate decision maker:Tomi Gonzalez: [phone] Above plan was discussed in details with patient, dr. Josef Cordova and dr Simuel Boxer. Please call me if any further questions or concerns. Will continue to participate in the care of this patient. 
  
 subjective: 
 
 no new complaints. Patient denies headaches, visual disturbances, sore throat, runny nose, earaches, cp, sob, cough, abdominal pain, diarrhea, burning micturition, or weakness in extremities. she denies back pain/flank pain. Medication List  
  
ASK your doctor about these medications   
carvedilol 6.25 mg tablet Commonly known as:  COREG 
  
cholecalciferol (VITAMIN D3) 5,000 unit Tab tablet Commonly known as:  VITAMIN D3 
  
famotidine 20 mg tablet Commonly known as:  PEPCID Take 1 Tab by mouth nightly. ferrous sulfate 325 mg (65 mg iron) tablet Take 1 Tab by mouth two (2) times daily (with meals). folic acid 1 mg tablet Commonly known as:  Google Take 1 Tab by mouth daily. furosemide 40 mg tablet Commonly known as:  LASIX 
1 tab daily  Indications: Edema 
  
gabapentin 300 mg capsule Commonly known as:  NEURONTIN Take 2 Caps by mouth two (2) times a day. Indications: NEUROPATHIC PAIN 
  
insulin glargine 100 unit/mL injection Commonly known as:  LANTUS U-100 INSULIN 
5 Units by SubCUTAneous route nightly. Indications: type 2 diabetes mellitus 
  
insulin lispro 100 unit/mL injection Commonly known as:  HUMALOG See sliding scale JANUVIA 50 mg tablet Generic drug:  SITagliptin Lactobacillus Acidoph & Bulgar 1 million cell Tab tablet Commonly known as:  Fleeta Boast Take 1 Tab by mouth two (2) times a day. potassium chloride 20 mEq tablet Commonly known as:  K-DUR, KLOR-CON Take 1 Tab by mouth two (2) times a day. pravastatin 40 mg tablet Commonly known as:  PRAVACHOL Current Facility-Administered Medications Medication Dose Route Frequency  lactated Ringers infusion  50 mL/hr IntraVENous CONTINUOUS  
 sodium hypochlorite (QUARTER STRENGTH DAKIN'S) 0.125% irrigation (bottle)   Topical BID  carvedilol (COREG) tablet 6.25 mg  6.25 mg Oral BID WITH MEALS  famotidine (PEPCID) tablet 20 mg  20 mg Oral QHS  ferrous sulfate tablet 325 mg  325 mg Oral BID WITH MEALS  folic acid (FOLVITE) tablet 1 mg  1 mg Oral DAILY  gabapentin (NEURONTIN) capsule 600 mg  600 mg Oral BID  insulin glargine (LANTUS) injection 5 Units  5 Units SubCUTAneous QHS  Lactobacillus Acidoph & Bulgar [facility]) tablet 1 Tab  1 Tab Oral BID  pravastatin (PRAVACHOL) tablet 40 mg  40 mg Oral QHS  SITagliptin (JANUVIA) tablet 50 mg  50 mg Oral DAILY  ondansetron (ZOFRAN) injection 4 mg  4 mg IntraVENous Q4H PRN  
 heparin (porcine) injection 5,000 Units  5,000 Units SubCUTAneous Q8H  
 insulin lispro (HUMALOG) injection   SubCUTAneous AC&HS  
 glucose chewable tablet 16 g  16 g Oral PRN  
 glucagon (GLUCAGEN) injection 1 mg  1 mg IntraMUSCular PRN  
 dextrose (D50W) injection syrg 12.5-25 g  25-50 mL IntraVENous PRN Allergies: Vancomycin; Ampicillin; Bactrim [sulfamethoxazole-trimethoprim]; Blueberry; Ciprofloxacin; Codeine; Crestor [rosuvastatin];  Darvocet a500 [propoxyphene n-acetaminophen]; Demerol [meperidine]; Levaquin [levofloxacin]; Lipitor [atorvastatin]; Magnesium oxide; Minocin [minocycline]; Pcn [penicillins]; Pravachol [pravastatin]; Shellfish derived; Sulfa (sulfonamide antibiotics); Ultracet [tramadol-acetaminophen]; Vicodin [hydrocodone-acetaminophen]; Vytorin 10-10 [ezetimibe-simvastatin]; and Percodan [oxycodone hcl-oxycodone-asa] Temp (24hrs), Av.2 °F (36.8 °C), Min:98 °F (36.7 °C), Max:98.5 °F (36.9 °C) Visit Vitals /71 (BP 1 Location: Left arm, BP Patient Position: At rest) Pulse 75 Temp 98 °F (36.7 °C) Resp 16 Ht 5' 7\" (1.702 m) Wt 111.6 kg (246 lb) SpO2 98% Breastfeeding? No  
BMI 38.53 kg/m² ROS: 12 point ROS obtained in details. Pertinent positives as mentioned in HPI,  
otherwise negative Physical Exam: 
 
General: Well developed, well nourished female laying on the bed, AAOx3 NAD 
   
General:  awake alert and oriented HEENT:  Normocephalic, atraumatic, PERRL, EOMI, no scleral icterus or pallor; no conjunctival hemmohage; nasal and oral mucous are moist and without evidence of lesions. Neck supple, no bruits. Lymph Nodes:  no cervical, axillary or inguinal adenopathy Lungs:  non-labored, bilaterally clear to auscultation- no crackles wheezes rales or rhonchi Heart:  s1 and s2 irregular; no rubs or gallops, no edema, + pedal pulses Abdomen: soft, non-distended, active bowel sounds, no hepatomegaly, no splenomegaly. no tenderness over the left abdominal pacemaker, no overlying erythema or fluctuance,no tenderness Genitourinary: deferred Extremities:  no clubbing, cyanosis; ulcer posterior right heel without surrounding erythema, resolved edema right leg/foot,no purulent drainage Neurologic:  No gross focal sensory abnormalities; 5/5 muscle strength to upper extremities. 1/5 strength in lower extremities. Cranial nerves intact Skin:  Surgical scars abdomen, left neck well healed Back: no paraspinal muscle guarding or rigidity, no spinal or paraspinal tenderness Psychiatric:  No suicidal or homicidal ideations, appropriate mood and affect  
   
  
 
 
 
 
Labs: Results:  
Chemistry Recent Labs 10/19/18 
5128 10/18/18 
0050 10/17/18 
1211 * 125* 140*  141 136  
K 3.9 3.4* 4.0  
* 106 104 CO2 28 26 24 BUN 25* 25* 24* CREA 1.26 1.35* 1.39* CA 8.3* 8.5 9.0 AGAP 5 9 8 BUCR 20 19 17 AP  --   --  120* TP  --   --  8.4* ALB  --   --  3.0*  
GLOB  --   --  5.4* AGRAT  --   --  0.6* CBC w/Diff Recent Labs 10/19/18 
1901 10/18/18 
0050 10/17/18 
1211 WBC 4.9 6.4 7.5  
RBC 3.78* 3.62* 4.17* HGB 10.5* 10.0* 11.7* HCT 31.7* 30.4* 34.2*  
 208 241 GRANS 56 58 65 LYMPH 33 33 27 EOS 4 3 2 Microbiology Recent Labs 10/17/18 
1845 10/17/18 
1830 10/17/18 
1215 10/17/18 
1200 CULT MRSA target DNA is detected (presumptive positive for MRSA colonization). *  CALLED TO Leonard Wang RN 4N AT [phone]. REPEATED TO [phone]. FEW GRAM NEGATIVE RODS*  CULTURE IN 2321 [address] UPDATES TO FOLLOW NO GROWTH 2 DAYS NO GROWTH 2 DAYS  
  
 
 
RADIOLOGY: 
 
All available imaging studies/reports in [facility] for this admission were reviewed Dr. Leif Narayanan, Infectious Disease Specialist 
[phone] October 19, 2018 
1:58 PM

## 2018-10-19 NOTE — ANESTHESIA PREPROCEDURE EVALUATION
Anesthetic History No history of anesthetic complications Review of Systems / Medical History Patient summary reviewed and pertinent labs reviewed Pulmonary Sleep apnea: No treatment Neuro/Psych Within defined limits Cardiovascular Dysrhythmias Exercise tolerance: <4 METS 
  
GI/Hepatic/Renal 
  
 
 
 
 
 
 Endo/Other Diabetes: well controlled, type 2 Morbid obesity Other Findings Physical Exam 
 
Airway Mallampati: III 
TM Distance: 4 - 6 cm Neck ROM: decreased range of motion Mouth opening: Diminished (comment) Cardiovascular Rhythm: regular Rate: normal 
 
 
 
 Dental 
 
Dentition: Poor dentition Pulmonary Breath sounds clear to auscultation Abdominal 
GI exam deferred Other Findings Anesthetic Plan ASA: 3 Anesthesia type: MAC Induction: Intravenous Anesthetic plan and risks discussed with: Patient

## 2018-10-19 NOTE — PROGRESS NOTES
[facility] Patient: Gume Gross Age: 76 y.o. : 1950 MR#: [id number] [id number]: [id number] Date/Time: 10/19/2018 Subjective:  
Patient  report feeling same, has no fever, chills or rigor She was waiting for debridement of her right heel wound Assessment/Plan: 1. Right diabetic foot infection: podiatry did debridement and wound vac application. Antibiotics started post debridement: on Linezolid and Cefepime 2. DM-2: continue Jaqnuvia, lantus 5 units and sliding scale,a ccucheck 3. Chronic systolic CHF, EF 59-94%: continue: Coreg,  
4. HTN, controlled: continue Coerg 5. Hyperlipidemia, continue Pravachol 
 
  
Diet: carb consistent Code: full code I spent 30 minutes with the patient in face-to-face consultation, of which greater than 50% was spent in counseling and coordination of care as described above. Case discussed with:  [x]Patient  []Family  []Nursing  []Case Management DVT Prophylaxis:  []Lovenox  [x]Hep SQ  []SCDs  []Coumadin   []On Heparin gtt Objective:  
VS:  
Visit Vitals /63 Pulse 73 Temp 98.4 °F (36.9 °C) Resp 15 Ht 5' 7\" (1.702 m) Wt 111.6 kg (246 lb) SpO2 98% Breastfeeding? No  
BMI 38.53 kg/m² Tmax/24hrs: Temp (24hrs), Av.1 °F (36.7 °C), Min:97.2 °F (36.2 °C), Max:98.4 °F (36.9 °C) IOBRIEF Intake/Output Summary (Last 24 hours) at 10/19/2018 1619 Last data filed at 10/19/2018 1415 Gross per 24 hour Intake 550 ml Output 2500 ml Net -1950 ml General:  Alert, cooperative, no acute distress   
HEENT: PERRLA, anicteric sclerae. Pulmonary:  CTA Bilaterally. No Wheezing/Rhonchi/Rales. Cardiovascular: Regular rate and Rhythm. GI:  Soft, Non distended, Non tender. + Bowel sounds. Extremities:  No edema, cyanosis, clubbing. No calf tenderness. Psych: Good insight. Not anxious or agitated. Neurologic: Alert and oriented X 3. No acute neuro deficits. Additional: Medications:  
Current Facility-Administered Medications Medication Dose Route Frequency  linezolid in dextrose 5% (ZYVOX) IVPB premix in D5W 600 mg  600 mg IntraVENous Q12H  cefepime (MAXIPIME) 2 g in sterile water (preservative free) 10 mL IV syringe  2 g IntraVENous Q8H  
 sodium hypochlorite (QUARTER STRENGTH DAKIN'S) 0.125% irrigation (bottle)   Topical BID  carvedilol (COREG) tablet 6.25 mg  6.25 mg Oral BID WITH MEALS  famotidine (PEPCID) tablet 20 mg  20 mg Oral QHS  ferrous sulfate tablet 325 mg  325 mg Oral BID WITH MEALS  folic acid (FOLVITE) tablet 1 mg  1 mg Oral DAILY  gabapentin (NEURONTIN) capsule 600 mg  600 mg Oral BID  insulin glargine (LANTUS) injection 5 Units  5 Units SubCUTAneous QHS  Lactobacillus Acidoph & Bulgar [facility]) tablet 1 Tab  1 Tab Oral BID  pravastatin (PRAVACHOL) tablet 40 mg  40 mg Oral QHS  SITagliptin (JANUVIA) tablet 50 mg  50 mg Oral DAILY  ondansetron (ZOFRAN) injection 4 mg  4 mg IntraVENous Q4H PRN  
 heparin (porcine) injection 5,000 Units  5,000 Units SubCUTAneous Q8H  
 insulin lispro (HUMALOG) injection   SubCUTAneous AC&HS Labs:   
Recent Results (from the past 24 hour(s)) GLUCOSE, POC Collection Time: 10/19/18 12:33 AM  
Result Value Ref Range Glucose (POC) 121 (H) 70 - 110 mg/dL CBC WITH AUTOMATED DIFF Collection Time: 10/19/18  3:51 AM  
Result Value Ref Range WBC 4.9 4.6 - 13.2 K/uL  
 RBC 3.78 (L) 4.20 - 5.30 M/uL  
 HGB 10.5 (L) 12.0 - 16.0 g/dL HCT 31.7 (L) 35.0 - 45.0 % MCV 83.9 74.0 - 97.0 FL  
 MCH 27.8 24.0 - 34.0 PG  
 MCHC 33.1 31.0 - 37.0 g/dL  
 RDW 14.5 11.6 - 14.5 % PLATELET 155 111 - 719 K/uL MPV 9.5 9.2 - 11.8 FL  
 NEUTROPHILS 56 40 - 73 % LYMPHOCYTES 33 21 - 52 % MONOCYTES 6 3 - 10 % EOSINOPHILS 4 0 - 5 % BASOPHILS 1 0 - 2 %  
 ABS. NEUTROPHILS 2.8 1.8 - 8.0 K/UL  
 ABS. LYMPHOCYTES 1.6 0.9 - 3.6 K/UL  
 ABS. MONOCYTES 0.3 0.05 - 1.2 K/UL ABS. EOSINOPHILS 0.2 0.0 - 0.4 K/UL  
 ABS. BASOPHILS 0.0 0.0 - 0.1 K/UL  
 DF AUTOMATED METABOLIC PANEL, BASIC Collection Time: 10/19/18  3:51 AM  
Result Value Ref Range Sodium 142 136 - 145 mmol/L Potassium 3.9 3.5 - 5.5 mmol/L Chloride 109 (H) 100 - 108 mmol/L  
 CO2 28 21 - 32 mmol/L Anion gap 5 3.0 - 18 mmol/L Glucose 130 (H) 74 - 99 mg/dL BUN 25 (H) 7.0 - 18 MG/DL Creatinine 1.26 0.6 - 1.3 MG/DL  
 BUN/Creatinine ratio 20 12 - 20 GFR est AA 51 (L) >60 ml/min/1.73m2 GFR est non-AA 42 (L) >60 ml/min/1.73m2 Calcium 8.3 (L) 8.5 - 10.1 MG/DL  
GLUCOSE, POC Collection Time: 10/19/18  8:10 AM  
Result Value Ref Range Glucose (POC) 137 (H) 70 - 110 mg/dL GLUCOSE, POC Collection Time: 10/19/18 11:21 AM  
Result Value Ref Range Glucose (POC) 130 (H) 70 - 110 mg/dL CULTURE, FUNGUS Collection Time: 10/19/18  2:10 PM  
Result Value Ref Range Special Requests: HEEL FUNGUS SMEAR NO FUNGAL ELEMENTS SEEN Culture result: PENDING   
CULTURE, TISSUE W GRAM STAIN Collection Time: 10/19/18  2:10 PM  
Result Value Ref Range Special Requests: RT HEEL   
 GRAM STAIN NO WBC'S SEEN    
 GRAM STAIN NO ORGANISMS SEEN Culture result: PENDING   
GLUCOSE, POC Collection Time: 10/19/18  2:42 PM  
Result Value Ref Range Glucose (POC) 109 70 - 110 mg/dL Signed By: Dory Mckinney MD   
 October 19, 2018

## 2018-10-19 NOTE — PROGRESS NOTES
H&P Update: 
Vamsi Velazquez was seen and examined. History and physical has been reviewed. The patient has been examined.  There have been no significant clinical changes since the completion of the originally dated History and Physical. 
 
Signed By: Kervin Kim DPM   
 October 19, 2018 1:08 PM

## 2018-10-19 NOTE — BRIEF OP NOTE
BRIEF OPERATIVE NOTE Date of Procedure: 10/19/2018 Preoperative Diagnosis: non healing right heel ulcer/Neuropathic ulcer and osteitis. Postoperative Diagnosis: non healing right heel ulcer/Neuropathic ulcer and osteitis. Procedure(s): DEBRIDEMENT RIGHT HEEL/ APPLICATION OF WOUND VAC Surgeon(s) and Role: Mary Hooks DPM - Primary Surgical Assistant: Aramis Escobar Surgical Staff: 
Circ-1: Flavio Landin Scrub Tech-1: Saud Box Surg Asst-1: Rainelle Fothergill Event Time In Time Out Incision Start 10/19/2018 1404 Incision Close Anesthesia: MAC Estimated Blood Loss: 10cc Specimens:  
ID Type Source Tests Collected by Time Destination 1 : right heel bone Preservative Bone  [facility] 10/19/2018 1411 Pathology 1 : right heel bone Wound Heel CULTURE, ANAEROBIC, CULTURE, WOUND [facility] 10/19/2018 1409 Microbiology Findings: osteitis Complications: none Implants: * No implants in log *

## 2018-10-19 NOTE — ANESTHESIA POSTPROCEDURE EVALUATION
Procedure(s): DEBRIDEMENT RIGHT HEEL/ APPLICATION OF WOUND VAC. 
 
<BSHSIANPOST> Visit Vitals BP (!) 412/64 Pulse 75 Temp 36.9 °C (98.4 °F) Resp 15 Ht 5' 7\" (1.702 m) Wt 111.6 kg (246 lb) SpO2 100% Breastfeeding? No  
BMI 38.53 kg/m²

## 2018-10-20 LAB
ANION GAP SERPL CALC-SCNC: 6 MMOL/L (ref 3–18)
BACTERIA SPEC CULT: ABNORMAL
BASOPHILS # BLD: 0 K/UL (ref 0–0.1)
BASOPHILS NFR BLD: 0 % (ref 0–2)
BUN SERPL-MCNC: 21 MG/DL (ref 7–18)
BUN/CREAT SERPL: 17 (ref 12–20)
CALCIUM SERPL-MCNC: 8.4 MG/DL (ref 8.5–10.1)
CHLORIDE SERPL-SCNC: 109 MMOL/L (ref 100–108)
CO2 SERPL-SCNC: 28 MMOL/L (ref 21–32)
CREAT SERPL-MCNC: 1.25 MG/DL (ref 0.6–1.3)
DIFFERENTIAL METHOD BLD: ABNORMAL
EOSINOPHIL # BLD: 0.2 K/UL (ref 0–0.4)
EOSINOPHIL NFR BLD: 3 % (ref 0–5)
ERYTHROCYTE [DISTWIDTH] IN BLOOD BY AUTOMATED COUNT: 14.4 % (ref 11.6–14.5)
GLUCOSE BLD STRIP.AUTO-MCNC: 140 MG/DL (ref 70–110)
GLUCOSE BLD STRIP.AUTO-MCNC: 157 MG/DL (ref 70–110)
GLUCOSE BLD STRIP.AUTO-MCNC: 158 MG/DL (ref 70–110)
GLUCOSE BLD STRIP.AUTO-MCNC: 208 MG/DL (ref 70–110)
GLUCOSE SERPL-MCNC: 142 MG/DL (ref 74–99)
GRAM STN SPEC: ABNORMAL
HCT VFR BLD AUTO: 30.3 % (ref 35–45)
HGB BLD-MCNC: 10 G/DL (ref 12–16)
LYMPHOCYTES # BLD: 1.5 K/UL (ref 0.9–3.6)
LYMPHOCYTES NFR BLD: 27 % (ref 21–52)
MCH RBC QN AUTO: 27.4 PG (ref 24–34)
MCHC RBC AUTO-ENTMCNC: 33 G/DL (ref 31–37)
MCV RBC AUTO: 83 FL (ref 74–97)
MONOCYTES # BLD: 0.3 K/UL (ref 0.05–1.2)
MONOCYTES NFR BLD: 5 % (ref 3–10)
NEUTS SEG # BLD: 3.6 K/UL (ref 1.8–8)
NEUTS SEG NFR BLD: 65 % (ref 40–73)
PLATELET # BLD AUTO: 194 K/UL (ref 135–420)
PMV BLD AUTO: 9.1 FL (ref 9.2–11.8)
POTASSIUM SERPL-SCNC: 3.8 MMOL/L (ref 3.5–5.5)
RBC # BLD AUTO: 3.65 M/UL (ref 4.2–5.3)
SERVICE CMNT-IMP: ABNORMAL
SODIUM SERPL-SCNC: 143 MMOL/L (ref 136–145)
WBC # BLD AUTO: 5.6 K/UL (ref 4.6–13.2)

## 2018-10-20 PROCEDURE — 74011000250 HC RX REV CODE- 250: Performed by: INTERNAL MEDICINE

## 2018-10-20 PROCEDURE — 74011636637 HC RX REV CODE- 636/637: Performed by: INTERNAL MEDICINE

## 2018-10-20 PROCEDURE — 85025 COMPLETE CBC W/AUTO DIFF WBC: CPT | Performed by: PODIATRIST

## 2018-10-20 PROCEDURE — 36415 COLL VENOUS BLD VENIPUNCTURE: CPT | Performed by: PODIATRIST

## 2018-10-20 PROCEDURE — 74011250636 HC RX REV CODE- 250/636: Performed by: INTERNAL MEDICINE

## 2018-10-20 PROCEDURE — 74011636637 HC RX REV CODE- 636/637: Performed by: HOSPITALIST

## 2018-10-20 PROCEDURE — 82962 GLUCOSE BLOOD TEST: CPT

## 2018-10-20 PROCEDURE — 65270000029 HC RM PRIVATE

## 2018-10-20 PROCEDURE — 80048 BASIC METABOLIC PNL TOTAL CA: CPT | Performed by: PODIATRIST

## 2018-10-20 PROCEDURE — 74011250637 HC RX REV CODE- 250/637: Performed by: INTERNAL MEDICINE

## 2018-10-20 RX ADMIN — FERROUS SULFATE TAB 325 MG (65 MG ELEMENTAL FE) 325 MG: 325 (65 FE) TAB at 09:34

## 2018-10-20 RX ADMIN — LINEZOLID 600 MG: 600 INJECTION, SOLUTION INTRAVENOUS at 09:34

## 2018-10-20 RX ADMIN — CARVEDILOL 6.25 MG: 6.25 TABLET, FILM COATED ORAL at 17:32

## 2018-10-20 RX ADMIN — LACTOBACILLUS TAB 1 TABLET: TAB at 17:32

## 2018-10-20 RX ADMIN — INSULIN LISPRO 6 UNITS: 100 INJECTION, SOLUTION INTRAVENOUS; SUBCUTANEOUS at 22:26

## 2018-10-20 RX ADMIN — DAKIN'S SOLUTION 0.125% (QUARTER STRENGTH): 0.12 SOLUTION at 09:44

## 2018-10-20 RX ADMIN — INSULIN GLARGINE 5 UNITS: 100 INJECTION, SOLUTION SUBCUTANEOUS at 22:28

## 2018-10-20 RX ADMIN — SITAGLIPTIN 50 MG: 50 TABLET, FILM COATED ORAL at 09:34

## 2018-10-20 RX ADMIN — WATER 2 G: 1 INJECTION INTRAMUSCULAR; INTRAVENOUS; SUBCUTANEOUS at 02:03

## 2018-10-20 RX ADMIN — CARVEDILOL 6.25 MG: 6.25 TABLET, FILM COATED ORAL at 09:34

## 2018-10-20 RX ADMIN — HEPARIN SODIUM 5000 UNITS: 5000 INJECTION, SOLUTION INTRAVENOUS; SUBCUTANEOUS at 22:28

## 2018-10-20 RX ADMIN — FOLIC ACID 1 MG: 1 TABLET ORAL at 09:34

## 2018-10-20 RX ADMIN — LINEZOLID 600 MG: 600 INJECTION, SOLUTION INTRAVENOUS at 22:26

## 2018-10-20 RX ADMIN — HEPARIN SODIUM 5000 UNITS: 5000 INJECTION, SOLUTION INTRAVENOUS; SUBCUTANEOUS at 17:29

## 2018-10-20 RX ADMIN — WATER 2 G: 1 INJECTION INTRAMUSCULAR; INTRAVENOUS; SUBCUTANEOUS at 09:33

## 2018-10-20 RX ADMIN — LACTOBACILLUS TAB 1 TABLET: TAB at 09:33

## 2018-10-20 RX ADMIN — GABAPENTIN 600 MG: 300 CAPSULE ORAL at 09:34

## 2018-10-20 RX ADMIN — HEPARIN SODIUM 5000 UNITS: 5000 INJECTION, SOLUTION INTRAVENOUS; SUBCUTANEOUS at 05:41

## 2018-10-20 RX ADMIN — WATER 2 G: 1 INJECTION INTRAMUSCULAR; INTRAVENOUS; SUBCUTANEOUS at 18:29

## 2018-10-20 RX ADMIN — GABAPENTIN 600 MG: 300 CAPSULE ORAL at 17:32

## 2018-10-20 RX ADMIN — FERROUS SULFATE TAB 325 MG (65 MG ELEMENTAL FE) 325 MG: 325 (65 FE) TAB at 17:32

## 2018-10-20 RX ADMIN — INSULIN LISPRO 2 UNITS: 100 INJECTION, SOLUTION INTRAVENOUS; SUBCUTANEOUS at 17:32

## 2018-10-20 RX ADMIN — PRAVASTATIN SODIUM 40 MG: 20 TABLET ORAL at 22:25

## 2018-10-20 RX ADMIN — INSULIN LISPRO 2 UNITS: 100 INJECTION, SOLUTION INTRAVENOUS; SUBCUTANEOUS at 13:17

## 2018-10-20 NOTE — PROGRESS NOTES
[facility] Hospitalist Group Progress Note Patient: Ines Chaudhary Age: 76 y.o. : 1950 MR#: [id number] SSN: [id number] Date/Time: 10/20/2018 Subjective:  
 reports irritation around IV side during ZYVOX infusion,  
Assessment/Plan: 1.  Right foot Open ulcer with bone infection and Achilles tendon infection, right heel. nfection: podiatry did debridement and wound vac application. Antibiotics started post debridement: on Linezolid and Cefepime, per ID 2. DM-2: well controled; continue Jaqnuvia, lantus 5 units and sliding scale,a ccucheck 3. Chronic systolic CHF, EF 11-28%: continue: Coreg,  
4. HTN, controlled: continue Coerg 5. Hyperlipidemia, continue Pravachol 
  
  
Diet: carb consistent Code: full code I spent 30 minutes with the patient in face-to-face consultation, of which greater than 50% was spent in counseling and coordination of care as described above. 
  
 
 
Case discussed with:  [x]Patient  []Family  []Nursing  []Case Management DVT Prophylaxis:  []Lovenox  [x]Hep SQ  []SCDs  []Coumadin   []On Heparin gtt Objective:  
VS:  
Visit Vitals /75 (BP 1 Location: Left arm, BP Patient Position: At rest) Pulse 74 Temp 98.6 °F (37 °C) Resp 16 Ht 5' 7\" (1.702 m) Wt 111.6 kg (246 lb) SpO2 98% Breastfeeding? No  
BMI 38.53 kg/m² Tmax/24hrs: Temp (24hrs), Av.2 °F (36.8 °C), Min:97.7 °F (36.5 °C), Max:98.6 °F (37 °C) IOBRIEF Intake/Output Summary (Last 24 hours) at 10/20/2018 1625 Last data filed at 10/20/2018 1337 Gross per 24 hour Intake 480 ml Output 1270 ml Net -790 ml General:  Alert, cooperative, no acute distress   
HEENT: PERRLA, anicteric sclerae. Pulmonary:  CTA Bilaterally. No Wheezing/Rhonchi/Rales. Cardiovascular: Regular rate and Rhythm. GI:  Soft, Non distended, Non tender. + Bowel sounds. Extremities:  No edema, cyanosis, clubbing. No calf tenderness. Psych: Good insight. Not anxious or agitated. Neurologic: Alert and oriented X 3. No acute neuro deficits. Additional: 
 
Medications:  
Current Facility-Administered Medications Medication Dose Route Frequency  linezolid in dextrose 5% (ZYVOX) IVPB premix in D5W 600 mg  600 mg IntraVENous Q12H  cefepime (MAXIPIME) 2 g in sterile water (preservative free) 10 mL IV syringe  2 g IntraVENous Q8H  
 sodium hypochlorite (QUARTER STRENGTH DAKIN'S) 0.125% irrigation (bottle)   Topical BID  carvedilol (COREG) tablet 6.25 mg  6.25 mg Oral BID WITH MEALS  famotidine (PEPCID) tablet 20 mg  20 mg Oral QHS  ferrous sulfate tablet 325 mg  325 mg Oral BID WITH MEALS  folic acid (FOLVITE) tablet 1 mg  1 mg Oral DAILY  gabapentin (NEURONTIN) capsule 600 mg  600 mg Oral BID  insulin glargine (LANTUS) injection 5 Units  5 Units SubCUTAneous QHS  Lactobacillus Acidoph & Bulgar [facility]) tablet 1 Tab  1 Tab Oral BID  pravastatin (PRAVACHOL) tablet 40 mg  40 mg Oral QHS  SITagliptin (JANUVIA) tablet 50 mg  50 mg Oral DAILY  ondansetron (ZOFRAN) injection 4 mg  4 mg IntraVENous Q4H PRN  
 heparin (porcine) injection 5,000 Units  5,000 Units SubCUTAneous Q8H  
 insulin lispro (HUMALOG) injection   SubCUTAneous AC&HS Labs:   
Recent Results (from the past 24 hour(s)) GLUCOSE, POC Collection Time: 10/19/18  4:38 PM  
Result Value Ref Range Glucose (POC) 119 (H) 70 - 110 mg/dL GLUCOSE, POC Collection Time: 10/19/18  9:31 PM  
Result Value Ref Range Glucose (POC) 157 (H) 70 - 110 mg/dL CBC WITH AUTOMATED DIFF Collection Time: 10/20/18  1:46 AM  
Result Value Ref Range WBC 5.6 4.6 - 13.2 K/uL  
 RBC 3.65 (L) 4.20 - 5.30 M/uL  
 HGB 10.0 (L) 12.0 - 16.0 g/dL HCT 30.3 (L) 35.0 - 45.0 % MCV 83.0 74.0 - 97.0 FL  
 MCH 27.4 24.0 - 34.0 PG  
 MCHC 33.0 31.0 - 37.0 g/dL  
 RDW 14.4 11.6 - 14.5 % PLATELET 210 203 - 117 K/uL  MPV 9.1 (L) 9.2 - 11.8 FL  
 NEUTROPHILS 65 40 - 73 % LYMPHOCYTES 27 21 - 52 % MONOCYTES 5 3 - 10 % EOSINOPHILS 3 0 - 5 % BASOPHILS 0 0 - 2 %  
 ABS. NEUTROPHILS 3.6 1.8 - 8.0 K/UL  
 ABS. LYMPHOCYTES 1.5 0.9 - 3.6 K/UL  
 ABS. MONOCYTES 0.3 0.05 - 1.2 K/UL  
 ABS. EOSINOPHILS 0.2 0.0 - 0.4 K/UL  
 ABS. BASOPHILS 0.0 0.0 - 0.1 K/UL  
 DF AUTOMATED METABOLIC PANEL, BASIC Collection Time: 10/20/18  1:46 AM  
Result Value Ref Range Sodium 143 136 - 145 mmol/L Potassium 3.8 3.5 - 5.5 mmol/L Chloride 109 (H) 100 - 108 mmol/L  
 CO2 28 21 - 32 mmol/L Anion gap 6 3.0 - 18 mmol/L Glucose 142 (H) 74 - 99 mg/dL BUN 21 (H) 7.0 - 18 MG/DL Creatinine 1.25 0.6 - 1.3 MG/DL  
 BUN/Creatinine ratio 17 12 - 20 GFR est AA 52 (L) >60 ml/min/1.73m2 GFR est non-AA 43 (L) >60 ml/min/1.73m2 Calcium 8.4 (L) 8.5 - 10.1 MG/DL  
GLUCOSE, POC Collection Time: 10/20/18  8:11 AM  
Result Value Ref Range Glucose (POC) 140 (H) 70 - 110 mg/dL GLUCOSE, POC Collection Time: 10/20/18 11:58 AM  
Result Value Ref Range Glucose (POC) 158 (H) 70 - 110 mg/dL GLUCOSE, POC Collection Time: 10/20/18  3:50 PM  
Result Value Ref Range Glucose (POC) 157 (H) 70 - 110 mg/dL Signed By: Lorena Lopez MD   
 October 20, 2018

## 2018-10-20 NOTE — PROGRESS NOTES
Bedside and Verbal shift change report given to Joceline Cortez (oncoming nurse) by Nely Hernandez RN 
 (offgoing nurse). Report given with SBAR, Elida, MAR and Recent Results.

## 2018-10-20 NOTE — PROGRESS NOTES
Pt concerned about current episode of diarrhea, pt started on cefepime and Zyvox which was started today. RN attempt to assist pt back to bed from commode, pt refuse stating she felt like she has 'to go' all the time. MD made aware, 
 
0883: Wound culture previously positive for MRSA, is also positive for ESBL, Dr. Reed Curiel notifies. No further incidence of bowel movement during this shift, pt assisted with personal hygiene. 9741: Bedside shift change report given to Iam Chaparro RN (oncoming nurse) by Niels Solorzano RN (offgoing nurse). Report included the following information SBAR, Procedure Summary, Intake/Output, MAR and Recent Results.

## 2018-10-20 NOTE — OP NOTES
[facility]  OPERATIVE REPORT    Name:Modesta MARS  MR#: [id number]  : 1950  ACCOUNT #: [de-identified]   DATE OF SERVICE: 10/19/2018    PREOPERATIVE DIAGNOSIS:  Open ulcer with bone infection and Achilles tendon infection, right heel. POSTOPERATIVE DIAGNOSIS:  Open ulcer with bone infection and Achilles tendon infection, right heel. PROCEDURE PERFORMED:  Radical excisional debridement of skin, subcutaneous tissue, muscle, tendon and bone, partial resection and drainage of calcaneus, right heel; application of wound VAC. SURGEON:  Michelle Murphy DPM    ASSISTANT:  -    ANESTHESIA:  MAC.    ESTIMATED BLOOD LOSS:  -    SPECIMENS REMOVED:  -    COMPLICATIONS:  -    IMPLANTS:  -    PROCEDURE:  The patient was placed on the operating table in supine position. After induction of MAC anesthesia, the right lower extremity was prepped and draped in usual sterile manner. Attention was directed to the posterior aspect of the right heel area, ulcerated with exposure of the calcaneus, devitalized bone, devitalized tendon with the tendo Achilles insertion. Excisional debridement of the edges of the wound were excising down to the bone through the necrotic tendon and bone. Using osteotome and mallet, the posterior aspect of the calcaneus was resected. The bone was sent for culture and pathology. Wound edges were debrided back to healthy tissue. Small blood vessels used the Bovie as necessary. Bone was sent for culture and pathology. Wound thoroughly irrigated with antibiotic solution and a wound VAC was applied with a compressive dressing. Patient tolerated the procedure and anesthesia well, vital signs stable throughout. Patient was transported to the recovery room in stable condition.       MOOKIE Tate / BRIDGETT  D: 10/19/2018 14:21     T: 10/20/2018 11:41  JOB #: [id number]  CC: Angelica Min DPM

## 2018-10-21 LAB
ANION GAP SERPL CALC-SCNC: 6 MMOL/L (ref 3–18)
BASOPHILS # BLD: 0 K/UL (ref 0–0.1)
BASOPHILS NFR BLD: 1 % (ref 0–2)
BUN SERPL-MCNC: 25 MG/DL (ref 7–18)
BUN/CREAT SERPL: 17 (ref 12–20)
CALCIUM SERPL-MCNC: 8.6 MG/DL (ref 8.5–10.1)
CHLORIDE SERPL-SCNC: 108 MMOL/L (ref 100–108)
CO2 SERPL-SCNC: 28 MMOL/L (ref 21–32)
CREAT SERPL-MCNC: 1.43 MG/DL (ref 0.6–1.3)
DIFFERENTIAL METHOD BLD: ABNORMAL
EOSINOPHIL # BLD: 0.2 K/UL (ref 0–0.4)
EOSINOPHIL NFR BLD: 4 % (ref 0–5)
ERYTHROCYTE [DISTWIDTH] IN BLOOD BY AUTOMATED COUNT: 14.4 % (ref 11.6–14.5)
GLUCOSE BLD STRIP.AUTO-MCNC: 125 MG/DL (ref 70–110)
GLUCOSE BLD STRIP.AUTO-MCNC: 159 MG/DL (ref 70–110)
GLUCOSE BLD STRIP.AUTO-MCNC: 203 MG/DL (ref 70–110)
GLUCOSE BLD STRIP.AUTO-MCNC: 212 MG/DL (ref 70–110)
GLUCOSE SERPL-MCNC: 153 MG/DL (ref 74–99)
HCT VFR BLD AUTO: 29.8 % (ref 35–45)
HGB BLD-MCNC: 9.8 G/DL (ref 12–16)
LYMPHOCYTES # BLD: 1.3 K/UL (ref 0.9–3.6)
LYMPHOCYTES NFR BLD: 28 % (ref 21–52)
MCH RBC QN AUTO: 27.5 PG (ref 24–34)
MCHC RBC AUTO-ENTMCNC: 32.9 G/DL (ref 31–37)
MCV RBC AUTO: 83.5 FL (ref 74–97)
MONOCYTES # BLD: 0.3 K/UL (ref 0.05–1.2)
MONOCYTES NFR BLD: 7 % (ref 3–10)
NEUTS SEG # BLD: 3 K/UL (ref 1.8–8)
NEUTS SEG NFR BLD: 60 % (ref 40–73)
PLATELET # BLD AUTO: 176 K/UL (ref 135–420)
PMV BLD AUTO: 9.3 FL (ref 9.2–11.8)
POTASSIUM SERPL-SCNC: 4.2 MMOL/L (ref 3.5–5.5)
RBC # BLD AUTO: 3.57 M/UL (ref 4.2–5.3)
SODIUM SERPL-SCNC: 142 MMOL/L (ref 136–145)
WBC # BLD AUTO: 4.8 K/UL (ref 4.6–13.2)

## 2018-10-21 PROCEDURE — 65270000029 HC RM PRIVATE

## 2018-10-21 PROCEDURE — 74011250636 HC RX REV CODE- 250/636: Performed by: INTERNAL MEDICINE

## 2018-10-21 PROCEDURE — 85025 COMPLETE CBC W/AUTO DIFF WBC: CPT | Performed by: PODIATRIST

## 2018-10-21 PROCEDURE — 74011000250 HC RX REV CODE- 250: Performed by: INTERNAL MEDICINE

## 2018-10-21 PROCEDURE — 82962 GLUCOSE BLOOD TEST: CPT

## 2018-10-21 PROCEDURE — 74011636637 HC RX REV CODE- 636/637: Performed by: INTERNAL MEDICINE

## 2018-10-21 PROCEDURE — 74011250637 HC RX REV CODE- 250/637: Performed by: INTERNAL MEDICINE

## 2018-10-21 PROCEDURE — 74011636637 HC RX REV CODE- 636/637: Performed by: HOSPITALIST

## 2018-10-21 PROCEDURE — 80048 BASIC METABOLIC PNL TOTAL CA: CPT | Performed by: PODIATRIST

## 2018-10-21 PROCEDURE — 36415 COLL VENOUS BLD VENIPUNCTURE: CPT | Performed by: PODIATRIST

## 2018-10-21 RX ORDER — BACLOFEN 10 MG/1
10 TABLET ORAL 3 TIMES DAILY
Status: DISCONTINUED | OUTPATIENT
Start: 2018-10-21 | End: 2018-10-23 | Stop reason: HOSPADM

## 2018-10-21 RX ADMIN — HEPARIN SODIUM 5000 UNITS: 5000 INJECTION, SOLUTION INTRAVENOUS; SUBCUTANEOUS at 23:50

## 2018-10-21 RX ADMIN — LINEZOLID 600 MG: 600 INJECTION, SOLUTION INTRAVENOUS at 22:48

## 2018-10-21 RX ADMIN — CARVEDILOL 6.25 MG: 6.25 TABLET, FILM COATED ORAL at 09:17

## 2018-10-21 RX ADMIN — WATER 2 G: 1 INJECTION INTRAMUSCULAR; INTRAVENOUS; SUBCUTANEOUS at 03:12

## 2018-10-21 RX ADMIN — GABAPENTIN 600 MG: 300 CAPSULE ORAL at 09:17

## 2018-10-21 RX ADMIN — INSULIN LISPRO 3 UNITS: 100 INJECTION, SOLUTION INTRAVENOUS; SUBCUTANEOUS at 09:07

## 2018-10-21 RX ADMIN — MEROPENEM 1 G: 1 INJECTION, POWDER, FOR SOLUTION INTRAVENOUS at 16:21

## 2018-10-21 RX ADMIN — GABAPENTIN 600 MG: 300 CAPSULE ORAL at 17:23

## 2018-10-21 RX ADMIN — FOLIC ACID 1 MG: 1 TABLET ORAL at 09:16

## 2018-10-21 RX ADMIN — INSULIN LISPRO 6 UNITS: 100 INJECTION, SOLUTION INTRAVENOUS; SUBCUTANEOUS at 17:08

## 2018-10-21 RX ADMIN — WATER 2 G: 1 INJECTION INTRAMUSCULAR; INTRAVENOUS; SUBCUTANEOUS at 09:00

## 2018-10-21 RX ADMIN — LACTOBACILLUS TAB 1 TABLET: TAB at 09:16

## 2018-10-21 RX ADMIN — BACLOFEN 10 MG: 10 TABLET ORAL at 17:23

## 2018-10-21 RX ADMIN — HEPARIN SODIUM 5000 UNITS: 5000 INJECTION, SOLUTION INTRAVENOUS; SUBCUTANEOUS at 03:12

## 2018-10-21 RX ADMIN — CARVEDILOL 6.25 MG: 6.25 TABLET, FILM COATED ORAL at 16:21

## 2018-10-21 RX ADMIN — HEPARIN SODIUM 5000 UNITS: 5000 INJECTION, SOLUTION INTRAVENOUS; SUBCUTANEOUS at 12:57

## 2018-10-21 RX ADMIN — FERROUS SULFATE TAB 325 MG (65 MG ELEMENTAL FE) 325 MG: 325 (65 FE) TAB at 09:17

## 2018-10-21 RX ADMIN — PRAVASTATIN SODIUM 40 MG: 20 TABLET ORAL at 23:54

## 2018-10-21 RX ADMIN — SITAGLIPTIN 50 MG: 50 TABLET, FILM COATED ORAL at 09:16

## 2018-10-21 RX ADMIN — INSULIN LISPRO 6 UNITS: 100 INJECTION, SOLUTION INTRAVENOUS; SUBCUTANEOUS at 12:44

## 2018-10-21 RX ADMIN — FERROUS SULFATE TAB 325 MG (65 MG ELEMENTAL FE) 325 MG: 325 (65 FE) TAB at 16:21

## 2018-10-21 RX ADMIN — LINEZOLID 600 MG: 600 INJECTION, SOLUTION INTRAVENOUS at 08:56

## 2018-10-21 RX ADMIN — BACLOFEN 10 MG: 10 TABLET ORAL at 23:54

## 2018-10-21 RX ADMIN — INSULIN GLARGINE 5 UNITS: 100 INJECTION, SOLUTION SUBCUTANEOUS at 23:56

## 2018-10-21 RX ADMIN — LACTOBACILLUS TAB 1 TABLET: TAB at 17:23

## 2018-10-21 NOTE — ROUTINE PROCESS
Bedside and Verbal shift change report given to  Sven Leung by Alin Gomez RN 
(offgoing nurse). Report included the following information SBAR, MAR and Recent Results.

## 2018-10-21 NOTE — ROUTINE PROCESS
Bedside shift change report given to [address] (oncoming nurse) by Jm Smith RN (offgoing nurse). Report included the following information SBAR, Kardex, Intake/Output, MAR and Recent Results.

## 2018-10-21 NOTE — PROGRESS NOTES
Problem: Falls - Risk of 
Goal: *Absence of Falls Document Teresita Albert Fall Risk and appropriate interventions in the flowsheet. Outcome: Progressing Towards Goal 
Fall Risk Interventions: 
Mobility Interventions: Bed/chair exit alarm Medication Interventions: Bed/chair exit alarm Elimination Interventions: Patient to call for help with toileting needs, Call light in reach Problem: Pressure Injury - Risk of 
Goal: *Prevention of pressure injury Document Surjit Scale and appropriate interventions in the flowsheet. Outcome: Progressing Towards Goal 
Pressure Injury Interventions: 
Sensory Interventions: Assess changes in LOC, Float heels, Keep linens dry and wrinkle-free, Minimize linen layers, Pad between skin to skin Activity Interventions: Pressure redistribution bed/mattress(bed type), PT/OT evaluation Mobility Interventions: HOB 30 degrees or less Nutrition Interventions: Document food/fluid/supplement intake Friction and Shear Interventions: HOB 30 degrees or less, Apply protective barrier, creams and emollients

## 2018-10-21 NOTE — PROGRESS NOTES
[facility] Hospitalist Group Progress Note Patient: Wiliam Daniels Age: 76 y.o. : 1950 MR#: [id number] SSN: [id number] Date/Time: 10/21/2018 Subjective:  
. Assessment/Plan: 1. Right foot Open ulcer with bone infection and Achilles tendon infection, right heel. nfection: podiatry did debridement and wound vac application. Antibiotics started post debridement: on Linezolid and Meropenem . SC Cefepime as Klebsiella isolate is resistant to it 2. DM-2: well controled; continue Jaqnuvia, lantus 5 units and sliding scale,a ccucheck 3. Chronic systolic CHF, EF 02-49%: continue: Coreg,  
4. HTN, controlled: continue Coerg 5. Hyperlipidemia, continue Pravachol 
  
  
Diet: carb consistent Code: full code 
  
   
I spent 25  minutes with the patient in face-to-face consultation, of which greater than 50% was spent in counseling and coordination of care as described above. Case discussed with:  []Patient  []Family  [x]Nursing  []Case Management DVT Prophylaxis:  []Lovenox  [x]Hep SQ  []SCDs  []Coumadin   []On Heparin gtt Objective:  
VS:  
Visit Vitals /71 (BP 1 Location: Left arm, BP Patient Position: At rest) Pulse 75 Temp 98.1 °F (36.7 °C) Resp 18 Ht 5' 7\" (1.702 m) Wt 111.6 kg (246 lb) SpO2 100% Breastfeeding? No  
BMI 38.53 kg/m² Tmax/24hrs: Temp (24hrs), Av.4 °F (36.9 °C), Min:98.1 °F (36.7 °C), Max:99 °F (37.2 °C) IOBRIEF Intake/Output Summary (Last 24 hours) at 10/21/2018 1445 Last data filed at 10/21/2018 1233 Gross per 24 hour Intake 550 ml Output 700 ml Net -150 ml General:  Alert, cooperative, no acute distress   
HEENT: PERRLA, anicteric sclerae. Pulmonary:  CTA Bilaterally. No Wheezing/Rhonchi/Rales. Cardiovascular: Regular rate and Rhythm. GI:  Soft, Non distended, Non tender. + Bowel sounds. Extremities:  Right foot has dressing and wound Vac Psych: Good insight. Not anxious or agitated. Neurologic: Alert and oriented X 3. No acute neuro deficits. Additional: 
 
Medications:  
Current Facility-Administered Medications Medication Dose Route Frequency  linezolid in dextrose 5% (ZYVOX) IVPB premix in D5W 600 mg  600 mg IntraVENous Q12H  cefepime (MAXIPIME) 2 g in sterile water (preservative free) 10 mL IV syringe  2 g IntraVENous Q8H  
 sodium hypochlorite (QUARTER STRENGTH DAKIN'S) 0.125% irrigation (bottle)   Topical BID  carvedilol (COREG) tablet 6.25 mg  6.25 mg Oral BID WITH MEALS  famotidine (PEPCID) tablet 20 mg  20 mg Oral QHS  ferrous sulfate tablet 325 mg  325 mg Oral BID WITH MEALS  folic acid (FOLVITE) tablet 1 mg  1 mg Oral DAILY  gabapentin (NEURONTIN) capsule 600 mg  600 mg Oral BID  insulin glargine (LANTUS) injection 5 Units  5 Units SubCUTAneous QHS  Lactobacillus Acidoph & Bulgar [facility]) tablet 1 Tab  1 Tab Oral BID  pravastatin (PRAVACHOL) tablet 40 mg  40 mg Oral QHS  SITagliptin (JANUVIA) tablet 50 mg  50 mg Oral DAILY  ondansetron (ZOFRAN) injection 4 mg  4 mg IntraVENous Q4H PRN  
 heparin (porcine) injection 5,000 Units  5,000 Units SubCUTAneous Q8H  
 insulin lispro (HUMALOG) injection   SubCUTAneous AC&HS Labs:   
Recent Results (from the past 24 hour(s)) GLUCOSE, POC Collection Time: 10/20/18  3:50 PM  
Result Value Ref Range Glucose (POC) 157 (H) 70 - 110 mg/dL GLUCOSE, POC Collection Time: 10/20/18  9:40 PM  
Result Value Ref Range Glucose (POC) 208 (H) 70 - 110 mg/dL CBC WITH AUTOMATED DIFF Collection Time: 10/21/18  5:35 AM  
Result Value Ref Range WBC 4.8 4.6 - 13.2 K/uL  
 RBC 3.57 (L) 4.20 - 5.30 M/uL HGB 9.8 (L) 12.0 - 16.0 g/dL HCT 29.8 (L) 35.0 - 45.0 % MCV 83.5 74.0 - 97.0 FL  
 MCH 27.5 24.0 - 34.0 PG  
 MCHC 32.9 31.0 - 37.0 g/dL  
 RDW 14.4 11.6 - 14.5 % PLATELET 322 114 - 533 K/uL MPV 9.3 9.2 - 11.8 FL  
 NEUTROPHILS 60 40 - 73 % LYMPHOCYTES 28 21 - 52 % MONOCYTES 7 3 - 10 % EOSINOPHILS 4 0 - 5 % BASOPHILS 1 0 - 2 %  
 ABS. NEUTROPHILS 3.0 1.8 - 8.0 K/UL  
 ABS. LYMPHOCYTES 1.3 0.9 - 3.6 K/UL  
 ABS. MONOCYTES 0.3 0.05 - 1.2 K/UL  
 ABS. EOSINOPHILS 0.2 0.0 - 0.4 K/UL  
 ABS. BASOPHILS 0.0 0.0 - 0.1 K/UL  
 DF AUTOMATED METABOLIC PANEL, BASIC Collection Time: 10/21/18  5:35 AM  
Result Value Ref Range Sodium 142 136 - 145 mmol/L Potassium 4.2 3.5 - 5.5 mmol/L Chloride 108 100 - 108 mmol/L  
 CO2 28 21 - 32 mmol/L Anion gap 6 3.0 - 18 mmol/L Glucose 153 (H) 74 - 99 mg/dL BUN 25 (H) 7.0 - 18 MG/DL Creatinine 1.43 (H) 0.6 - 1.3 MG/DL  
 BUN/Creatinine ratio 17 12 - 20 GFR est AA 44 (L) >60 ml/min/1.73m2 GFR est non-AA 36 (L) >60 ml/min/1.73m2 Calcium 8.6 8.5 - 10.1 MG/DL  
GLUCOSE, POC Collection Time: 10/21/18  7:54 AM  
Result Value Ref Range Glucose (POC) 159 (H) 70 - 110 mg/dL GLUCOSE, POC Collection Time: 10/21/18 12:02 PM  
Result Value Ref Range Glucose (POC) 212 (H) 70 - 110 mg/dL Signed By: Candi Bunn MD   
 October 21, 2018

## 2018-10-21 NOTE — PROGRESS NOTES
PT orders received and chart was reviewed. Pt will require weight bearing orders for right LE prior to PT evaluation. Will continue to follow.   Ce Morel PT

## 2018-10-21 NOTE — PROGRESS NOTES
Occupational Therapy Note: Orders received, chart reviewed and this patient is pending weight bearing status prior to OT eval and treatment being completed. Will f/u as appropriate for this patient.  Agnes Becker, OTR/L

## 2018-10-21 NOTE — PROGRESS NOTES
Received report from [facility]. Patient is on contact isolation for MRSA/ESBL. A/OX4. Denies pain. Appears to be in no resp. distress. Fall precautions. Call bell phone within reach. Side rails X3. Wound vac right heel intact, prevalon boot in place.

## 2018-10-22 LAB
BACTERIA SPEC CULT: ABNORMAL
GLUCOSE BLD STRIP.AUTO-MCNC: 119 MG/DL (ref 70–110)
GLUCOSE BLD STRIP.AUTO-MCNC: 127 MG/DL (ref 70–110)
GLUCOSE BLD STRIP.AUTO-MCNC: 131 MG/DL (ref 70–110)
GLUCOSE BLD STRIP.AUTO-MCNC: 169 MG/DL (ref 70–110)
GRAM STN SPEC: ABNORMAL
GRAM STN SPEC: ABNORMAL
SERVICE CMNT-IMP: ABNORMAL

## 2018-10-22 PROCEDURE — 74011250636 HC RX REV CODE- 250/636: Performed by: INTERNAL MEDICINE

## 2018-10-22 PROCEDURE — 74011636637 HC RX REV CODE- 636/637: Performed by: INTERNAL MEDICINE

## 2018-10-22 PROCEDURE — 74011250637 HC RX REV CODE- 250/637: Performed by: PODIATRIST

## 2018-10-22 PROCEDURE — 74011636637 HC RX REV CODE- 636/637: Performed by: HOSPITALIST

## 2018-10-22 PROCEDURE — 74011250637 HC RX REV CODE- 250/637: Performed by: INTERNAL MEDICINE

## 2018-10-22 PROCEDURE — 82962 GLUCOSE BLOOD TEST: CPT

## 2018-10-22 PROCEDURE — 74011250636 HC RX REV CODE- 250/636: Performed by: PODIATRIST

## 2018-10-22 PROCEDURE — 65270000029 HC RM PRIVATE

## 2018-10-22 PROCEDURE — 77030027138 HC INCENT SPIROMETER -A

## 2018-10-22 PROCEDURE — 77030019934 HC DRSG VAC ASST KCON -B

## 2018-10-22 PROCEDURE — 97161 PT EVAL LOW COMPLEX 20 MIN: CPT

## 2018-10-22 PROCEDURE — 74011250637 HC RX REV CODE- 250/637: Performed by: HOSPITALIST

## 2018-10-22 PROCEDURE — 74011000250 HC RX REV CODE- 250: Performed by: INTERNAL MEDICINE

## 2018-10-22 RX ORDER — DIPHENHYDRAMINE HYDROCHLORIDE 50 MG/ML
12.5 INJECTION, SOLUTION INTRAMUSCULAR; INTRAVENOUS 3 TIMES WEEKLY
Status: DISCONTINUED | OUTPATIENT
Start: 2018-10-22 | End: 2018-10-22

## 2018-10-22 RX ORDER — LEVOFLOXACIN 500 MG/1
500 TABLET, FILM COATED ORAL EVERY 24 HOURS
Status: DISCONTINUED | OUTPATIENT
Start: 2018-10-22 | End: 2018-10-23

## 2018-10-22 RX ORDER — HYDROCODONE BITARTRATE AND ACETAMINOPHEN 10; 325 MG/1; MG/1
1 TABLET ORAL ONCE
Status: COMPLETED | OUTPATIENT
Start: 2018-10-22 | End: 2018-10-22

## 2018-10-22 RX ORDER — ONDANSETRON 4 MG/1
4 TABLET, ORALLY DISINTEGRATING ORAL
Status: DISCONTINUED | OUTPATIENT
Start: 2018-10-22 | End: 2018-10-23 | Stop reason: HOSPADM

## 2018-10-22 RX ORDER — DIPHENHYDRAMINE HCL 25 MG
25 CAPSULE ORAL
Status: COMPLETED | OUTPATIENT
Start: 2018-10-22 | End: 2018-10-22

## 2018-10-22 RX ORDER — AMOXICILLIN AND CLAVULANATE POTASSIUM 875; 125 MG/1; MG/1
1 TABLET, FILM COATED ORAL EVERY 12 HOURS
Status: DISCONTINUED | OUTPATIENT
Start: 2018-10-22 | End: 2018-10-23

## 2018-10-22 RX ORDER — MORPHINE SULFATE 4 MG/ML
1 INJECTION INTRAVENOUS
Status: DISCONTINUED | OUTPATIENT
Start: 2018-10-22 | End: 2018-10-22

## 2018-10-22 RX ORDER — HYDROXYZINE PAMOATE 25 MG/1
25 CAPSULE ORAL
Status: DISCONTINUED | OUTPATIENT
Start: 2018-10-22 | End: 2018-10-23 | Stop reason: HOSPADM

## 2018-10-22 RX ORDER — DIPHENHYDRAMINE HCL 25 MG
25 CAPSULE ORAL
Status: DISCONTINUED | OUTPATIENT
Start: 2018-10-22 | End: 2018-10-23 | Stop reason: HOSPADM

## 2018-10-22 RX ADMIN — DIPHENHYDRAMINE HYDROCHLORIDE 25 MG: 25 CAPSULE ORAL at 13:03

## 2018-10-22 RX ADMIN — SITAGLIPTIN 50 MG: 50 TABLET, FILM COATED ORAL at 09:58

## 2018-10-22 RX ADMIN — HEPARIN SODIUM 5000 UNITS: 5000 INJECTION, SOLUTION INTRAVENOUS; SUBCUTANEOUS at 17:55

## 2018-10-22 RX ADMIN — FAMOTIDINE 20 MG: 20 TABLET ORAL at 22:44

## 2018-10-22 RX ADMIN — PRAVASTATIN SODIUM 40 MG: 20 TABLET ORAL at 22:44

## 2018-10-22 RX ADMIN — CARVEDILOL 6.25 MG: 6.25 TABLET, FILM COATED ORAL at 17:55

## 2018-10-22 RX ADMIN — CARVEDILOL 6.25 MG: 6.25 TABLET, FILM COATED ORAL at 09:58

## 2018-10-22 RX ADMIN — LACTOBACILLUS TAB 1 TABLET: TAB at 17:55

## 2018-10-22 RX ADMIN — BACLOFEN 10 MG: 10 TABLET ORAL at 17:55

## 2018-10-22 RX ADMIN — GABAPENTIN 600 MG: 300 CAPSULE ORAL at 17:54

## 2018-10-22 RX ADMIN — DIPHENHYDRAMINE HYDROCHLORIDE 25 MG: 25 CAPSULE ORAL at 11:39

## 2018-10-22 RX ADMIN — HYDROCODONE BITARTRATE AND ACETAMINOPHEN 1 TABLET: 10; 325 TABLET ORAL at 11:39

## 2018-10-22 RX ADMIN — FERROUS SULFATE TAB 325 MG (65 MG ELEMENTAL FE) 325 MG: 325 (65 FE) TAB at 17:55

## 2018-10-22 RX ADMIN — AMOXICILLIN AND CLAVULANATE POTASSIUM 1 TABLET: 875; 125 TABLET, FILM COATED ORAL at 13:42

## 2018-10-22 RX ADMIN — FERROUS SULFATE TAB 325 MG (65 MG ELEMENTAL FE) 325 MG: 325 (65 FE) TAB at 09:58

## 2018-10-22 RX ADMIN — ONDANSETRON 4 MG: 4 TABLET, ORALLY DISINTEGRATING ORAL at 18:21

## 2018-10-22 RX ADMIN — BACLOFEN 10 MG: 10 TABLET ORAL at 09:58

## 2018-10-22 RX ADMIN — HEPARIN SODIUM 5000 UNITS: 5000 INJECTION, SOLUTION INTRAVENOUS; SUBCUTANEOUS at 09:57

## 2018-10-22 RX ADMIN — INSULIN GLARGINE 5 UNITS: 100 INJECTION, SOLUTION SUBCUTANEOUS at 22:52

## 2018-10-22 RX ADMIN — LEVOFLOXACIN 500 MG: 500 TABLET, FILM COATED ORAL at 13:42

## 2018-10-22 RX ADMIN — FOLIC ACID 1 MG: 1 TABLET ORAL at 09:58

## 2018-10-22 RX ADMIN — HYDROXYZINE PAMOATE 25 MG: 25 CAPSULE ORAL at 19:18

## 2018-10-22 RX ADMIN — AMOXICILLIN AND CLAVULANATE POTASSIUM 1 TABLET: 875; 125 TABLET, FILM COATED ORAL at 22:44

## 2018-10-22 RX ADMIN — LACTOBACILLUS TAB 1 TABLET: TAB at 09:58

## 2018-10-22 RX ADMIN — GABAPENTIN 600 MG: 300 CAPSULE ORAL at 09:58

## 2018-10-22 RX ADMIN — INSULIN LISPRO 3 UNITS: 100 INJECTION, SOLUTION INTRAVENOUS; SUBCUTANEOUS at 09:59

## 2018-10-22 RX ADMIN — BACLOFEN 10 MG: 10 TABLET ORAL at 22:44

## 2018-10-22 RX ADMIN — MEROPENEM 1 G: 1 INJECTION, POWDER, FOR SOLUTION INTRAVENOUS at 04:33

## 2018-10-22 NOTE — PROGRESS NOTES
Seen at bedside awake and alert. Wound vac in place. Stable after surgery. No inflammation or necrosis. Cleared for discharge pending  Final ID report.

## 2018-10-22 NOTE — PROGRESS NOTES
Pt's RN requested to hold OT eval for now, as the pt was recently given 2 Benadryl, and is subsequently sleepy/drowsy. Will follow-up to complete OT eval late today, if able.

## 2018-10-22 NOTE — ROUTINE PROCESS
Bedside and Verbal shift change report given to  Juli Christina (oncoming nurse) by Jayne Freeman RN 
(offgoing nurse). Report included the following information SBAR, MAR and Recent Results.

## 2018-10-22 NOTE — PROGRESS NOTES
Problem: Mobility Impaired (Adult and Pediatric) Goal: *Acute Goals and Plan of Care (Insert Text) Outcome: Resolved/Met Date Met: 10/22/18 physical Therapy EVALUATION & Discharge Patient: Kade Brice (89 y.o. female) Date: 10/22/2018 Primary Diagnosis: Heel ulcer ([address] Procedure(s) (LRB): DEBRIDEMENT RIGHT HEEL/ APPLICATION OF WOUND VAC (Right) 3 Days Post-Op Precautions: NWB RLE 
 
ASSESSMENT AND RECOMMENDATIONS: 
Patient is 67yo F admitted to hospital for right heel wound and underwent debridement with wound vac placement and presents today alert and agreeable to therapy and was supine in bed upon arrival. Patient transferred to EOB and performed bed to chair transfer from elevated bed height to bedside commode with excellent RLE NWB compliance as patient did not contact BLE to ground. Patient reports she's been performing this transfer with nursing present while she's been in hospital. Patient is requesting to have additional time on commode and reports she will use call bell to request nursing assist/supervision back to bed. Call bell left by patient's side and she denied further need for assist. Patient is functioning at her baseline and nursing in room notified that patient will be D/C from PT. Patient encouraged to continue getting to [facility] and into [facility] during the day and nursing present for discussion. Skilled physical therapy is not indicated at this time. Discharge Recommendations: None Further Equipment Recommendations for Discharge: N/A   
 
G-:CODES Mobility [id number] Current  CI= 1-19%   Goal  CI= 1-19%  D/C  CI= 1-19%. The severity rating is based on the Level of Assistance required for Functional Mobility and ADLs. Evaluation Complexity Eval Complexity: History: MEDIUM  Complexity : 1-2 comorbidities / personal factors will impact the outcome/ POC Exam:LOW Complexity : 1-2 Standardized tests and measures addressing body structure, function, activity limitation and / or participation in recreation  Presentation: LOW Complexity : Stable, uncomplicated  Clinical Decision Making:Low Complexity   Overall Complexity:LOW SUBJECTIVE:  
Patient stated I've learned I just have to get stuff done.  OBJECTIVE DATA SUMMARY:  
 
Past Medical History:  
Diagnosis Date  Acute paraplegia ([facility] [address] 4/20/2017  Benign hypertensive heart disease with systolic CHF, NYHA class 2 ([facility] [address] 9/5/2012  Biventricular implantable cardioverter-defibrillator in situ 04/28/2005 Upgraded to BiV AICD; gen change 4/2008; pocket revision 10/2009; Abdominal - done on 8/22/2012 by Dr. Xochilt Pradhan  Cardiac cath 08/15/1996 Patent coronaries. Elev LVEDP. EF 50-55%.  Cardiac echocardiogram 06/23/2015 Ltd study. EF 45-50%. Mild, diffuse hypk. Severe apical hypk. No mass or thrombus was clearly identified, although imaging was suboptimal.    
 Cardiac nuclear imaging test 06/19/2015 Fixed distal apical, distal septal defect more likely due to RV pacing than prior infarct. No ischemia. EF 46%. RWMA c/w RV pacing. Nondiagnostic EKG on pharm stress test.    
 Cardiovascular lower extremity venous duplex 09/04/2012 Acute, non-occlusive DVT in CFV on right. No DVT on left. No superficial thrombosis bilaterally.  Cardiovascular upper extremity venous duplex 08/27/2012 DVT in axillary vein on left. Left subclavian was not visualized.  Chronic anemia 9/5/2012  Chronic systolic heart failure ([facility] [address]  Decreased calculated glomerular filtration rate (GFR) 3/30/2017 Calculated GFR equivalent to that of CKD stage 3 = 30-59 ml/min  Diabetic neuropathy associated with type 2 diabetes mellitus ([address] 6/28/2011  Difficult airway for intubation 08/22/2012  
 see anesthesia airway note  Dyslipidemia 6/28/2011  Gout  History of complete heart block 6/28/2011  History of Coumadin therapy Anticoagulation for DVT of the LUE; Discontinued on 3/30/2017  History of deep venous thrombosis 9/5/2012 Left upper extremity  History of pyelonephritis 3/30/2017  Left bundle branch block (LBBB) on electrocardiogram 6/28/2011  Nonischemic cardiomyopathy ([facility] [address] 6/28/2011  Obesity (BMI 35.0-39.9 without comorbidity) 3/13/2017  Obstructive sleep apnea on CPAP 2/7/2012  Psoas abscess, right ([address] 4/20/2017  Psoas hematoma, right, secondary to anticoagulant therapy 3/30/2017  Type 2 diabetes mellitus with diabetic neuropathy ([facility] [address] 6/28/2011 Past Surgical History:  
Procedure Laterality Date  HX CARPAL TUNNEL RELEASE  4/07 right [address] [place] [address]  HX OTHER SURGICAL  6/11/2012 AICD revision  HX PACEMAKER  4/28/2005 [employer] AICD Barriers to Learning/Limitations: None Compensate with: N/A Prior Level of Function/Home Situation: Patient lives with  in 1 story home with ramp to enter and has motorized WC for mobility. She has hospital bed and most of the time uses tranfer board to transfer bed <> chair. Home Situation Home Environment: Private residence # Steps to Enter: 4(has ramp) One/Two Story Residence: One story Living Alone: No 
Support Systems: Family member(s) Patient Expects to be Discharged to[de-identified] Private residence Current DME Used/Available at Home: Wheelchair, power, Hospital bedCritical Behavior: A&Ox4 Strength:   
Strength: Generally decreased, functional(BLE) Tone & Sensation:  
Tone: Normal(BLE) Sensation: Intact(BLE) Range Of Motion: 
AROM: Generally decreased, functional(BLE) Functional Mobility: 
Bed Mobility: 
 Supine to Sit: Modified independent Scooting: Modified independent Transfers: 
 Bed to Chair: Modified independent;Supervision Balance:  
Sitting: IntactPain: 
Pt reports 0/10 pain or discomfort prior to treatment.   
Pt reports 0/10 pain or discomfort post treatment. Activity Tolerance:  
Patient tolerated activity well and was left resting with call bell by his side Please refer to the flowsheet for vital signs taken during this treatment. After treatment:  
[x]         Patient left in no apparent distress sitting up in chair 
[]         Patient left in no apparent distress in bed 
[x]         Call bell left within reach [x]         Nursing notified 
[]         Caregiver present 
[]         Bed alarm activated 
[]         SCDs applied to B LE 
 
COMMUNICATION/EDUCATION:  
[x]         Fall prevention education was provided and the patient/caregiver indicated understanding. [x]         Patient/family have participated as able in goal setting and plan of care. [x]         Patient/family agree to work toward stated goals and plan of care. []         Patient understands intent and goals of therapy, but is neutral about his/her participation. []         Patient is unable to participate in goal setting and plan of care. Thank you for this referral. 
Domingo Christy, PT Time Calculation: 19 mins

## 2018-10-22 NOTE — PROGRESS NOTES
Dr. Shane Nichols aware of no IV access. She told me to give benadryl 30 min prior to oral abx. No need for IV access. Plan on discharge tomorrow

## 2018-10-22 NOTE — PROGRESS NOTES
Pt pleasantly declined to participate in the OT session, stating she is very nauseous, as a result of taking oral antibiotics. Will continue efforts tomorrow.

## 2018-10-22 NOTE — PROGRESS NOTES
Spoke to Nathalie Lares re: Pt lost PIV access and unable to obtain new PIV after two attempts. She has poor venous access and does not want to be \"stuck again, it hurts too much\". Spoke to Dr. Amanuel Moses also, waiting for I.D to determine if she needs home iv abx and PICC line. Brock said she would speak with ID. Informed unable to give abx due now.

## 2018-10-22 NOTE — DIABETES MGMT
Glycemic Control Plan of Care 
 
T2DM with last A1c level of 8.7% (10/18/2018). See separate notes, 10/18/2018, for assessment of home diabetes management and education. Home diabetes meds: Lantus insulin 20 units daily at bedtime and meal time lispro insulin 4 units TID AC. POC BG range on 10/21/2018: 125-212 mg/dL. POC BG report on 10/22/2018 at time of review: 169 mg/dL. Seen patient this morning and stated that her blood sugar is getting better since she had surgery of right heel. Patient is on 5 units lantus insulin QHS and very resistant dose of correctional lispro insulin ACHS. Recommendation(s): 
1.) Continue monitoring and consider increasing basal lantus insulin dose from 5 to 10 units daily at bedtime if BG is above target range. Assessment: 
Patient is 76year old with past medical history of type 2 diabetes mellitus with neuropathy, acute paraplegia, chronic systolic CHF, biventricular implantable cardioverter defibrillator, DVT left upper extremity, chronic anemia, dyslipidemia, obstructive sleep apnea, psoas abscess right, and pyelonephritis - was admitted on 10/17/2018 with report of right foot infection, foul smell drainage. Noted: 
Right heel ulcer. Status post debridement right heel/application of wound vac on 10/19/2018. T2DM with pending A1c result. Most recent blood glucose values: 
 
Results for Adalgisa Bravo (MRN [id number]) as of 10/22/2018 12:26 Ref. Range 10/21/2018 07:54 10/21/2018 12:02 10/21/2018 16:17 10/21/2018 22:26 GLUCOSE,FAST - POC Latest Ref Range: 70 - 110 mg/dL 159 (H) 212 (H) 203 (H) 125 (H) Results for Adalgisa Bravo (MRN [id number]) as of 10/22/2018 12:26 Ref. Range 10/22/2018 08:32 GLUCOSE,FAST - POC Latest Ref Range: 70 - 110 mg/dL 169 (H) Current A1C: Last A1c of 8.7% (10/18/2018) which is equivalent to estimated average blood glucose of 203 mg/dL during the past 2-3 months. Current hospital diabetes medications: Basal lantus insulin 5 units daily at bedtime. Correctional lispro insulin ACHS. Very resistant dose. Total daily dose insulin requirement previous day: 10/21/2018 Lantus: 5 units Lispro: 15 units (correctional) TDD: 10 units of insulin Home diabetes medications: Patient reported on 10/18/2018: 
Lantus insulin 20 units daily at bedtime. Lispro insulin 4 units 3x daily before meals. Diet: Diabetic consistent carb regular; nutr suppl: magic cups with breakfast, lunch and dinner and luz mix drink with lunch and dinner. Goals:  Blood glucose will be within target range of  mg/dL by 10/25/2018. Education:  _X__  Refer to Diabetes Education Record: 10/18/2018 
           ___  Education not indicated at this time Virginia Marlow RN [facility] Pager: [phone]

## 2018-10-23 ENCOUNTER — APPOINTMENT (OUTPATIENT)
Dept: INTERVENTIONAL RADIOLOGY/VASCULAR | Age: 68
DRG: 629 | End: 2018-10-23
Attending: INTERNAL MEDICINE
Payer: COMMERCIAL

## 2018-10-23 VITALS
TEMPERATURE: 97.7 F | OXYGEN SATURATION: 95 % | DIASTOLIC BLOOD PRESSURE: 79 MMHG | BODY MASS INDEX: 38.61 KG/M2 | WEIGHT: 246 LBS | HEART RATE: 70 BPM | RESPIRATION RATE: 20 BRPM | SYSTOLIC BLOOD PRESSURE: 147 MMHG | HEIGHT: 67 IN

## 2018-10-23 LAB
ANION GAP SERPL CALC-SCNC: 7 MMOL/L (ref 3–18)
BACTERIA SPEC CULT: NORMAL
BACTERIA SPEC CULT: NORMAL
BASOPHILS # BLD: 0 K/UL (ref 0–0.1)
BASOPHILS NFR BLD: 1 % (ref 0–2)
BUN SERPL-MCNC: 26 MG/DL (ref 7–18)
BUN/CREAT SERPL: 19 (ref 12–20)
CALCIUM SERPL-MCNC: 8.9 MG/DL (ref 8.5–10.1)
CHLORIDE SERPL-SCNC: 109 MMOL/L (ref 100–108)
CO2 SERPL-SCNC: 26 MMOL/L (ref 21–32)
CREAT SERPL-MCNC: 1.34 MG/DL (ref 0.6–1.3)
DIFFERENTIAL METHOD BLD: ABNORMAL
EOSINOPHIL # BLD: 0.2 K/UL (ref 0–0.4)
EOSINOPHIL NFR BLD: 6 % (ref 0–5)
ERYTHROCYTE [DISTWIDTH] IN BLOOD BY AUTOMATED COUNT: 14.8 % (ref 11.6–14.5)
GLUCOSE BLD STRIP.AUTO-MCNC: 132 MG/DL (ref 70–110)
GLUCOSE BLD STRIP.AUTO-MCNC: 139 MG/DL (ref 70–110)
GLUCOSE BLD STRIP.AUTO-MCNC: 151 MG/DL (ref 70–110)
GLUCOSE SERPL-MCNC: 113 MG/DL (ref 74–99)
HCT VFR BLD AUTO: 31.3 % (ref 35–45)
HGB BLD-MCNC: 10.3 G/DL (ref 12–16)
LYMPHOCYTES # BLD: 1.4 K/UL (ref 0.9–3.6)
LYMPHOCYTES NFR BLD: 32 % (ref 21–52)
MCH RBC QN AUTO: 27.9 PG (ref 24–34)
MCHC RBC AUTO-ENTMCNC: 32.9 G/DL (ref 31–37)
MCV RBC AUTO: 84.8 FL (ref 74–97)
MONOCYTES # BLD: 0.2 K/UL (ref 0.05–1.2)
MONOCYTES NFR BLD: 4 % (ref 3–10)
NEUTS SEG # BLD: 2.4 K/UL (ref 1.8–8)
NEUTS SEG NFR BLD: 57 % (ref 40–73)
PLATELET # BLD AUTO: 171 K/UL (ref 135–420)
PMV BLD AUTO: 9.2 FL (ref 9.2–11.8)
POTASSIUM SERPL-SCNC: 4 MMOL/L (ref 3.5–5.5)
RBC # BLD AUTO: 3.69 M/UL (ref 4.2–5.3)
SERVICE CMNT-IMP: NORMAL
SERVICE CMNT-IMP: NORMAL
SODIUM SERPL-SCNC: 142 MMOL/L (ref 136–145)
WBC # BLD AUTO: 4.2 K/UL (ref 4.6–13.2)

## 2018-10-23 PROCEDURE — 77030010545

## 2018-10-23 PROCEDURE — 74011250636 HC RX REV CODE- 250/636: Performed by: INTERNAL MEDICINE

## 2018-10-23 PROCEDURE — 36415 COLL VENOUS BLD VENIPUNCTURE: CPT | Performed by: PHYSICIAN ASSISTANT

## 2018-10-23 PROCEDURE — 82962 GLUCOSE BLOOD TEST: CPT

## 2018-10-23 PROCEDURE — 85025 COMPLETE CBC W/AUTO DIFF WBC: CPT | Performed by: PHYSICIAN ASSISTANT

## 2018-10-23 PROCEDURE — 74011250637 HC RX REV CODE- 250/637: Performed by: INTERNAL MEDICINE

## 2018-10-23 PROCEDURE — 74011000250 HC RX REV CODE- 250: Performed by: INTERNAL MEDICINE

## 2018-10-23 PROCEDURE — 74011636637 HC RX REV CODE- 636/637: Performed by: HOSPITALIST

## 2018-10-23 PROCEDURE — 74011250637 HC RX REV CODE- 250/637: Performed by: HOSPITALIST

## 2018-10-23 PROCEDURE — 80048 BASIC METABOLIC PNL TOTAL CA: CPT | Performed by: PHYSICIAN ASSISTANT

## 2018-10-23 PROCEDURE — 05HY33Z INSERTION OF INFUSION DEVICE INTO UPPER VEIN, PERCUTANEOUS APPROACH: ICD-10-PCS | Performed by: RADIOLOGY

## 2018-10-23 PROCEDURE — 76937 US GUIDE VASCULAR ACCESS: CPT

## 2018-10-23 RX ORDER — LINEZOLID 2 MG/ML
600 INJECTION, SOLUTION INTRAVENOUS EVERY 12 HOURS
Status: DISCONTINUED | OUTPATIENT
Start: 2018-10-23 | End: 2018-10-23 | Stop reason: HOSPADM

## 2018-10-23 RX ORDER — BACLOFEN 10 MG/1
10 TABLET ORAL 3 TIMES DAILY
Qty: 1 TAB | Refills: 0 | Status: SHIPPED
Start: 2018-10-23 | End: 2019-03-21

## 2018-10-23 RX ORDER — HYDROXYZINE PAMOATE 25 MG/1
25 CAPSULE ORAL
Qty: 12 CAP | Refills: 0 | Status: SHIPPED | OUTPATIENT
Start: 2018-10-23 | End: 2018-11-06

## 2018-10-23 RX ORDER — ONDANSETRON 4 MG/1
4 TABLET, ORALLY DISINTEGRATING ORAL
Qty: 10 TAB | Refills: 0 | Status: SHIPPED | OUTPATIENT
Start: 2018-10-23 | End: 2019-02-07 | Stop reason: ALTCHOICE

## 2018-10-23 RX ADMIN — LACTOBACILLUS TAB 1 TABLET: TAB at 10:35

## 2018-10-23 RX ADMIN — MEROPENEM 1 G: 1 INJECTION, POWDER, FOR SOLUTION INTRAVENOUS at 20:14

## 2018-10-23 RX ADMIN — SITAGLIPTIN 50 MG: 50 TABLET, FILM COATED ORAL at 10:33

## 2018-10-23 RX ADMIN — BACLOFEN 10 MG: 10 TABLET ORAL at 19:17

## 2018-10-23 RX ADMIN — GABAPENTIN 600 MG: 300 CAPSULE ORAL at 19:17

## 2018-10-23 RX ADMIN — HEPARIN SODIUM 5000 UNITS: 5000 INJECTION, SOLUTION INTRAVENOUS; SUBCUTANEOUS at 01:00

## 2018-10-23 RX ADMIN — HEPARIN SODIUM 5000 UNITS: 5000 INJECTION, SOLUTION INTRAVENOUS; SUBCUTANEOUS at 10:33

## 2018-10-23 RX ADMIN — LINEZOLID 600 MG: 600 INJECTION, SOLUTION INTRAVENOUS at 15:09

## 2018-10-23 RX ADMIN — HYDROXYZINE PAMOATE 25 MG: 25 CAPSULE ORAL at 10:20

## 2018-10-23 RX ADMIN — FERROUS SULFATE TAB 325 MG (65 MG ELEMENTAL FE) 325 MG: 325 (65 FE) TAB at 10:34

## 2018-10-23 RX ADMIN — FOLIC ACID 1 MG: 1 TABLET ORAL at 10:35

## 2018-10-23 RX ADMIN — BACLOFEN 10 MG: 10 TABLET ORAL at 10:35

## 2018-10-23 RX ADMIN — INSULIN LISPRO 3 UNITS: 100 INJECTION, SOLUTION INTRAVENOUS; SUBCUTANEOUS at 12:28

## 2018-10-23 RX ADMIN — CARVEDILOL 6.25 MG: 6.25 TABLET, FILM COATED ORAL at 19:23

## 2018-10-23 RX ADMIN — FERROUS SULFATE TAB 325 MG (65 MG ELEMENTAL FE) 325 MG: 325 (65 FE) TAB at 19:17

## 2018-10-23 RX ADMIN — CARVEDILOL 6.25 MG: 6.25 TABLET, FILM COATED ORAL at 10:34

## 2018-10-23 RX ADMIN — LACTOBACILLUS TAB 1 TABLET: TAB at 19:17

## 2018-10-23 RX ADMIN — GABAPENTIN 600 MG: 300 CAPSULE ORAL at 10:34

## 2018-10-23 NOTE — PROGRESS NOTES
Infectious Disease progress Note Requested by: Dr. Emily Lisa Reason: right heel infection Current abx Prior abx Amoxicillin/clavulanate, levofloxacin since 1022 Cefepime 10/19-10/21 
linezolid 10/19-10/21 Lines:  
 
 
Assessment : 
 
76 y. o. female who has a h/o  uncontrolled DM2 (last hgbA1C 8.7 on 10/18/18), HTN, CHF, CAD and paraplegia who presented to ed on 10/17/18 due to worsening wound on her right heel.  
  
   
 hospitalization (4/20/17-5/15/17) for Sepsis  secondary to group B streptococcus bloodstream infection (positive blood cultures 4/20, negative blood cultures 4/21). Most likely source of bloodstream infection is infected right psoas hematoma/abscess. 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    
  
bilateral LE dvt - s/p IVC filter placement 5/11 
  
Hospitalization 8/2018 for infected right heel ulcer, acute osteomyelitis right calcaneum. S/p Radical debridement of necrotic tissue and debridement of heel bone.  Wound irrigation, application of wound VAC on 8/20/18. Intra op cultures negative. CRP 2.4 on 8/21/18 Now with non healing right heel ulcer. Highly complex clinical picture. clinical presentation consistent with acute on chronic osteomyelitis right calcaneum. S/p I&D. Intra op pathology confirms diagnosis of acute on chronic osteomyelitis. Intra op cultures 10/19 (off antibiotics)- esbl klebsiella, rare enterococcus faecalis, peptostreptococcus. Esr: 58 on 10/18. Crp: 2.4 on 10/19 Wound cultures 10/17- MRSA, esbl klebsiella, enterococcus faecalis, diphtheroids Urine cultures 10/17 reveals esbl e.coli, klebsiella likely colonizer. No s/s to suggest cystitis.   
 
Decreased edema right leg/right foot 
 
abx management highly complicated due to h/o multiple abx allergies including ampicillin/vancomycin. Has tolerated ceftriaxone in the past. Significant itching with vancomycin, quinolones.  
  
Recommendations: 
   
1. d/c augmentin, levaquin. Start iv meropenem, linezolid while inpatient 2. PICC line for outpatient iv antibiotics 3. Recommend to use meropenem, daptomycin till 12/7/18. Weekly labs while on abx. 4. Patient should follow up with dr. Shantell Lorenzo ([facility] outpatient infectious disease physician - [phone]) after 2 weeks. Please have outpatient labs faxed to her @ [phone]. 
  
  
 Advance Care planning: full code: discussed  with patient/surrogate decision maker:Tomi Gonzalez: [phone] Above plan was discussed in details with patient, , primary team. D/w ,  *, patient. All questions answered to their full satisfaction. Spent additional 35 minutes in management and evaluation of this patient. >50% time spent in counselling and coordination of care. Please call me if any further questions or concerns. Will continue to participate in the care of this patient. 
  
 subjective: 
 
 no new complaints. Patient denies headaches, visual disturbances, sore throat, runny nose, earaches, cp, sob, cough, abdominal pain, diarrhea, burning micturition, or weakness in extremities. she denies back pain/flank pain. Medication List  
  
ASK your doctor about these medications   
carvedilol 6.25 mg tablet Commonly known as:  COREG 
  
cholecalciferol (VITAMIN D3) 5,000 unit Tab tablet Commonly known as:  VITAMIN D3 
  
famotidine 20 mg tablet Commonly known as:  PEPCID Take 1 Tab by mouth nightly. ferrous sulfate 325 mg (65 mg iron) tablet Take 1 Tab by mouth two (2) times daily (with meals). folic acid 1 mg tablet Commonly known as:  Google Take 1 Tab by mouth daily. furosemide 40 mg tablet Commonly known as:  LASIX 
1 tab daily  Indications: Edema 
  
gabapentin 300 mg capsule Commonly known as:  NEURONTIN Take 2 Caps by mouth two (2) times a day. Indications: NEUROPATHIC PAIN 
  
insulin glargine 100 unit/mL injection Commonly known as:  LANTUS U-100 INSULIN 
5 Units by SubCUTAneous route nightly. Indications: type 2 diabetes mellitus 
  
insulin lispro 100 unit/mL injection Commonly known as:  HUMALOG See sliding scale JANUVIA 50 mg tablet Generic drug:  SITagliptin Lactobacillus Acidoph & Bulgar 1 million cell Tab tablet Commonly known as:  [place] Irena Take 1 Tab by mouth two (2) times a day. potassium chloride 20 mEq tablet Commonly known as:  K-DUR, KLOR-CON Take 1 Tab by mouth two (2) times a day. pravastatin 40 mg tablet Commonly known as:  PRAVACHOL Current Facility-Administered Medications Medication Dose Route Frequency  levoFLOXacin (LEVAQUIN) tablet 500 mg  500 mg Oral Q24H  
 amoxicillin-clavulanate (AUGMENTIN) 875-125 mg per tablet 1 Tab  1 Tab Oral Q12H  diphenhydrAMINE (BENADRYL) capsule 25 mg  25 mg Oral Q6H PRN  
 ondansetron (ZOFRAN ODT) tablet 4 mg  4 mg SubLINGual Q6H PRN  
 hydrOXYzine pamoate (VISTARIL) capsule 25 mg  25 mg Oral QID PRN  
 baclofen (LIORESAL) tablet 10 mg  10 mg Oral TID  carvedilol (COREG) tablet 6.25 mg  6.25 mg Oral BID WITH MEALS  famotidine (PEPCID) tablet 20 mg  20 mg Oral QHS  ferrous sulfate tablet 325 mg  325 mg Oral BID WITH MEALS  folic acid (FOLVITE) tablet 1 mg  1 mg Oral DAILY  gabapentin (NEURONTIN) capsule 600 mg  600 mg Oral BID  insulin glargine (LANTUS) injection 5 Units  5 Units SubCUTAneous QHS  Lactobacillus Acidoph & Bulgar [facility]) tablet 1 Tab  1 Tab Oral BID  pravastatin (PRAVACHOL) tablet 40 mg  40 mg Oral QHS  SITagliptin (JANUVIA) tablet 50 mg  50 mg Oral DAILY  heparin (porcine) injection 5,000 Units  5,000 Units SubCUTAneous Q8H  
 insulin lispro (HUMALOG) injection   SubCUTAneous AC&HS Allergies: Vancomycin; Ampicillin;  Bactrim [sulfamethoxazole-trimethoprim]; Blueberry; Ciprofloxacin; Codeine; Crestor [rosuvastatin]; Darvocet a500 [propoxyphene n-acetaminophen]; Demerol [meperidine]; Levaquin [levofloxacin]; Lipitor [atorvastatin]; Magnesium oxide; Minocin [minocycline]; Pcn [penicillins]; Pravachol [pravastatin]; Shellfish derived; Sulfa (sulfonamide antibiotics); Ultracet [tramadol-acetaminophen]; Vicodin [hydrocodone-acetaminophen]; Vytorin 10-10 [ezetimibe-simvastatin]; and Percodan [oxycodone hcl-oxycodone-asa] Temp (24hrs), Av.6 °F (36.4 °C), Min:97.4 °F (36.3 °C), Max:98 °F (36.7 °C) Visit Vitals /74 (BP 1 Location: Left arm, BP Patient Position: At rest) Pulse 67 Temp 97.5 °F (36.4 °C) Resp 20 Ht 5' 7\" (1.702 m) Wt 111.6 kg (246 lb) SpO2 97% Breastfeeding? No  
BMI 38.53 kg/m² ROS: 12 point ROS obtained in details. Pertinent positives as mentioned in HPI,  
otherwise negative Physical Exam: 
 
General: Well developed, well nourished female laying on the bed, AAOx3 NAD 
   
General:  awake alert and oriented HEENT:  Normocephalic, atraumatic, PERRL, EOMI, no scleral icterus or pallor; no conjunctival hemmohage; nasal and oral mucous are moist and without evidence of lesions. Neck supple, no bruits. Lymph Nodes:  no cervical, axillary or inguinal adenopathy Lungs:  non-labored, bilaterally clear to auscultation- no crackles wheezes rales or rhonchi Heart:  s1 and s2 irregular; no rubs or gallops, no edema, + pedal pulses Abdomen: soft, non-distended, active bowel sounds, no hepatomegaly, no splenomegaly. no tenderness over the left abdominal pacemaker, no overlying erythema or fluctuance,no tenderness Genitourinary: deferred Extremities:  no clubbing, cyanosis; wound vac right heel,  resolved edema right leg/foot,no purulent drainage Neurologic:  No gross focal sensory abnormalities; 5/5 muscle strength to upper extremities. 1/5 strength in lower extremities.  Cranial nerves intact Skin:  Surgical scars abdomen, left neck well healed Back: no paraspinal muscle guarding or rigidity, no spinal or paraspinal tenderness Psychiatric:  No suicidal or homicidal ideations, appropriate mood and affect  
   
  
 
 
 
 
Labs: Results:  
Chemistry Recent Labs 10/23/18 
2825 10/21/18 
2026 * 153*  142  
K 4.0 4.2 * 108 CO2 26 28 BUN 26* 25* CREA 1.34* 1.43* CA 8.9 8.6 AGAP 7 6 BUCR 19 17 CBC w/Diff Recent Labs 10/23/18 
4418 10/21/18 
2201 WBC 4.2* 4.8  
RBC 3.69* 3.57* HGB 10.3* 9.8* HCT 31.3* 29.8*  
 176 GRANS 57 60 LYMPH 32 28 EOS 6* 4 Microbiology No results for input(s): CULT in the last 72 hours. RADIOLOGY: 
 
All available imaging studies/reports in [facility] for this admission were reviewed Dr. Bev Tejeda, Infectious Disease Specialist 
[phone] October 23, 2018 
1:58 PM

## 2018-10-23 NOTE — PROCEDURES
INTERVENTIONAL RADIOLOGY POST PICC LINE NOTE       October 23, 2018       4:07 PM     Preoperative Diagnosis:   IV Access    Postoperative Diagnosis:  Same. :  Isabel Ashley    Assistant:  None. Attending Physician: Isabel Ashley    Type of Anesthesia:  1% Lidocaine local    Procedure/Description: right brachial  upper extremity PICC Line. Findings:  right brachial 4 [place] catheter placed. Tip at axillar vein. Length 29 cm. Estimated blood Loss: Minimal    Specimen Removed: None    Drains: None     Complications:  None. Condition:  Stable.     Discharge Plan:  continue present therapy

## 2018-10-23 NOTE — PROGRESS NOTES
Patient is not available to be assessed at this time. Patient was not in room at the time of visit. [id number] [facility]  
[phone]

## 2018-10-23 NOTE — PROGRESS NOTES
This pt was referred in e-discharge for [facility] who is this pt's previous provider, prior to this admission. Home health order for I.V. Antibiotic for home, called [facility] for the importance regarding this matter. This pt is assigned to the [facility] office. CM will continue to assist as needed. This pt was accepted by Tyra Moreno who reports that this pt is covered by 100% for home I.V. Antibiotic. The plan is for this pt is to be discharged home when this pt has her PICC line which will be faxed to the [facility] agency. Pt made aware of this plan and the cost involved. [facility] [employer]  [phone]

## 2018-10-23 NOTE — DISCHARGE SUMMARY
[facility]  Discharge Summary       Patient: Yonathan Bashir Age: 76 y.o. : 1950 MR#: [id number] SSN: [id number]  PCP on record: Vannesa Martinez MD  Admit date: 10/17/2018  Discharge date: 10/23/2018    Disposition:    []Home   [x]Home with Home Health   []SNF/NH   []Rehab   []Home with family   []Alternate Facility:____________________    Admission Diagnoses:  Heel ulcer ([facility] [address]    Discharge Diagnoses:                             1.  Right heel open ulcer with acute on chronic OM and Achilles tendon infection: s/p debridement and wound vac application per podiatry on 10/2018   2. Type 2 Diabetes Mellitus with neuropathy   3. Chronic systolic CHF, EF 48-28%   4. HTN, controlled  5. Hyperlipidemia   6. HALI on CPAP  7. CKD stage 3     Current Discharge Medication List      START taking these medications    Details   ondansetron (ZOFRAN ODT) 4 mg disintegrating tablet 1 Tab by SubLINGual route every six (6) hours as needed. Qty: 10 Tab, Refills: 0      meropenem 1 g IV syringe 1 g by IntraVENous route every eight (8) hours for 45 days. Qty: 1 Dose, Refills: 0      baclofen (LIORESAL) 10 mg tablet Take 1 Tab by mouth three (3) times daily. Qty: 1 Tab, Refills: 0      hydrOXYzine pamoate (VISTARIL) 25 mg capsule Take 1 Cap by mouth four (4) times daily as needed for Itching for up to 14 days. Qty: 12 Cap, Refills: 0      daptomycin 6 mg/kg IV q 24 hour     CONTINUE these medications which have NOT CHANGED    Details   cholecalciferol, VITAMIN D3, (VITAMIN D3) 5,000 unit tab tablet Take  by mouth daily. insulin lispro (HUMALOG) 100 unit/mL injection See sliding scale  Qty: 1 Vial, Refills: 0      folic acid (FOLVITE) 1 mg tablet Take 1 Tab by mouth daily. Qty: 30 Tab, Refills: 0      insulin glargine (LANTUS) 100 unit/mL injection 5 Units by SubCUTAneous route nightly.  Indications: type 2 diabetes mellitus  Qty: 1 Vial, Refills: 0    Associated Diagnoses: Type 2 diabetes mellitus with diabetic neuropathy, with long-term current use of insulin (HCC)      pravastatin (PRAVACHOL) 40 mg tablet Take 40 mg by mouth nightly. Indications: DYSLIPIDEMIA      carvedilol (COREG) 6.25 mg tablet 6.25 mg two (2) times a day. SITagliptin (JANUVIA) 50 mg tablet Take 50 mg by mouth daily. ferrous sulfate 325 mg (65 mg iron) tablet Take 1 Tab by mouth two (2) times daily (with meals). Qty: 100 Tab, Refills: 0      Lactobacillus Acidoph & Bulgar (FLORANEX) 1 million cell tab tablet Take 1 Tab by mouth two (2) times a day. Qty: 60 Tab, Refills: 0      furosemide (LASIX) 40 mg tablet 1 tab daily  Indications: Edema  Qty: 60 Tab, Refills: 0      gabapentin (NEURONTIN) 300 mg capsule Take 2 Caps by mouth two (2) times a day. Indications: NEUROPATHIC PAIN  Qty: 100 Cap, Refills: 0      famotidine (PEPCID) 20 mg tablet Take 1 Tab by mouth nightly. Qty: 30 Tab, Refills: 0         STOP taking these medications       potassium chloride (K-DUR, KLOR-CON) 20 mEq tablet Comments:   Reason for Stopping:             Consults:    - podiatry   - infectious disease   - interventional radiology     Procedures:  -   PROCEDURE PERFORMED:  Radical excisional debridement of skin, subcutaneous tissue, muscle, tendon and bone, partial resection and drainage of calcaneus, right heel; application of wound VAC on 10/20/2018    PROCEDURE:  The patient was placed on the operating table in supine position. After induction of MAC anesthesia, the right lower extremity was prepped and draped in usual sterile manner. Attention was directed to the posterior aspect of the right heel area, ulcerated with exposure of the calcaneus, devitalized bone, devitalized tendon with the tendo Achilles insertion. Excisional debridement of the edges of the wound were excising down to the bone through the necrotic tendon and bone. Using osteotome and mallet, the posterior aspect of the calcaneus was resected.   The bone was sent for culture and pathology. Wound edges were debrided back to healthy tissue. Small blood vessels used the Bovie as necessary. Bone was sent for culture and pathology. Wound thoroughly irrigated with antibiotic solution and a wound VAC was applied with a compressive dressing. Patient tolerated the procedure and anesthesia well, vital signs stable throughout. Patient was transported to the recovery room in stable condition. PICC line placement right arm on 10/23/2018    Significant Diagnostic Studies:   -  XR FOOT RT MIN 3 V on 10/17/2018  IMPRESSION:     Marked osteopenia. The bony erosion/infection at medial posterior calcaneus on   prior CT is not well visualized due to limitation of modality. Follow-up CT   advised. Large calcaneal spurs. Diffuse edema, slightly improved as above. Hospital Course by Problem   1. Right heel open ulcer with acute on chronic OM and Achilles tendon infection: s/p debridement and wound vac application per podiatry on 10/2018. ID appreciated and s/p PICC line placement today - will need meropenem, daptomycin until 12/7/18 per ID. Weekly labs (CBC diff, BUN/creatinine, crp) weekly while on abx and follow up with ID Dr. Jorge Mcadams as outpatient. 2. Type 2 Diabetes Mellitus with neuropathy- A1c 8.7 earlier this month, improved from 10.2 in 08/2017. Continued Januvia, lantus 5 units and sliding scale in addition to neurontin during admission with good control. Continue same upon discharge. 3. Chronic systolic CHF, EF 20-66%: continue - no acute issues, continued on coreg and resume lasix upon discharge. Patient in the past on potassium but potassium stable while inpatient. Follow as outpatient for stability of BMP. 4. HTN, controlled - continued Coreg  5. Hyperlipidemia - no acute issues, continued on pravachol  6. HALI on CPAP  7. CKD stage 3 - stable during admission with creatinine of 1.34 at time of discharge.       Today's examination of the patient revealed:     Subjective:   No complaints of nausea or itching at time of discharge. States itching / nausea present last night following oral abx administration. Itching is most bothersome for patient and states vistaril more effective. No other complaints including no CP, SOB, constipation, denies any pain to extremities including right foot. Objective:   VS:   Visit Vitals  /79 (BP 1 Location: Left arm, BP Patient Position: At rest)   Pulse 70   Temp 97.7 °F (36.5 °C)   Resp 20   Ht 5' 7\" (1.702 m)   Wt 111.6 kg (246 lb)   SpO2 95%   Breastfeeding? No   BMI 38.53 kg/m²      Tmax/24hrs: Temp (24hrs), Av.7 °F (36.5 °C), Min:97.4 °F (36.3 °C), Max:98.1 °F (36.7 °C)     Input/Output:     Intake/Output Summary (Last 24 hours) at 10/23/2018 1600  Last data filed at 10/23/2018 1529  Gross per 24 hour   Intake 1360 ml   Output 5675 ml   Net -4315 ml     General:  Awake, alert, NAD  Cardiovascular:  RRR  Pulmonary: CTA   GI:  NT, normal BS  Extremities:  No edema or cyanosis. Right heel dressing clean and intact. Wound vac connected. Labs:    Recent Results (from the past 24 hour(s))   GLUCOSE, POC    Collection Time: 10/22/18  4:36 PM   Result Value Ref Range    Glucose (POC) 131 (H) 70 - 110 mg/dL   GLUCOSE, POC    Collection Time: 10/22/18 10:51 PM   Result Value Ref Range    Glucose (POC) 119 (H) 70 - 110 mg/dL   CBC WITH AUTOMATED DIFF    Collection Time: 10/23/18  2:58 AM   Result Value Ref Range    WBC 4.2 (L) 4.6 - 13.2 K/uL    RBC 3.69 (L) 4.20 - 5.30 M/uL    HGB 10.3 (L) 12.0 - 16.0 g/dL    HCT 31.3 (L) 35.0 - 45.0 %    MCV 84.8 74.0 - 97.0 FL    MCH 27.9 24.0 - 34.0 PG    MCHC 32.9 31.0 - 37.0 g/dL    RDW 14.8 (H) 11.6 - 14.5 %    PLATELET 705 322 - 364 K/uL    MPV 9.2 9.2 - 11.8 FL    NEUTROPHILS 57 40 - 73 %    LYMPHOCYTES 32 21 - 52 %    MONOCYTES 4 3 - 10 %    EOSINOPHILS 6 (H) 0 - 5 %    BASOPHILS 1 0 - 2 %    ABS. NEUTROPHILS 2.4 1.8 - 8.0 K/UL    ABS.  LYMPHOCYTES 1.4 0.9 - 3.6 K/UL ABS. MONOCYTES 0.2 0.05 - 1.2 K/UL    ABS. EOSINOPHILS 0.2 0.0 - 0.4 K/UL    ABS.  BASOPHILS 0.0 0.0 - 0.1 K/UL    DF AUTOMATED     METABOLIC PANEL, BASIC    Collection Time: 10/23/18  2:58 AM   Result Value Ref Range    Sodium 142 136 - 145 mmol/L    Potassium 4.0 3.5 - 5.5 mmol/L    Chloride 109 (H) 100 - 108 mmol/L    CO2 26 21 - 32 mmol/L    Anion gap 7 3.0 - 18 mmol/L    Glucose 113 (H) 74 - 99 mg/dL    BUN 26 (H) 7.0 - 18 MG/DL    Creatinine 1.34 (H) 0.6 - 1.3 MG/DL    BUN/Creatinine ratio 19 12 - 20      GFR est AA 48 (L) >60 ml/min/1.73m2    GFR est non-AA 39 (L) >60 ml/min/1.73m2    Calcium 8.9 8.5 - 10.1 MG/DL   GLUCOSE, POC    Collection Time: 10/23/18  8:40 AM   Result Value Ref Range    Glucose (POC) 132 (H) 70 - 110 mg/dL   GLUCOSE, POC    Collection Time: 10/23/18 11:55 AM   Result Value Ref Range    Glucose (POC) 151 (H) 70 - 110 mg/dL   GLUCOSE, POC    Collection Time: 10/23/18  3:36 PM   Result Value Ref Range    Glucose (POC) 139 (H) 70 - 110 mg/dL     Additional Data Reviewed:     Condition: Stable  Follow-up Appointments:   Dr. Alexander Ramirez in 5-7 days   Dr. Maame Rodriguez in 7-10 days  Dr. Chinmay Duke (ID) in 3 weeks per Dr. Lee Farley. susan    >30 minutes spent coordinating this discharge (review instructions/follow-up, prescriptions, preparing report for sign off)    Signed:  Jacques Milian NP  10/23/2018  4:00 PM

## 2018-10-23 NOTE — PROGRESS NOTES
NUTRITION Nursing Referral: Pressure Injury RECOMMENDATIONS / PLAN:  
 
- Continue current nutrition interventions - Monitor meal/ supplement intake - Continue RD inpatient monitoring and evaluation. NUTRITION INTERVENTIONS & DIAGNOSIS:  
 
[x] Meals/snacks: modified composition 
[x] Medical food supplement therapy:  Lukas BID, Magic Cup ARTHUR, TID Nutrition Diagnosis:  Inadequate oral intake related to decreased appetite as evidenced by poor meal intake x 2 days PTA and since admission. Increased nutrient needs (protein) related to promotion of wound healing as evidenced by pressure injury ASSESSMENT:  
 
10/23: Pt off unit at time of visit. Has good meal intake per chart documentation. Will continue to monitor. Appetite improving since admission; good appetite per chart 10/18: Pt reported appetite and meal intake are usually good; decreased x 2 days PTA and since admission. Poor meal intake this morning. Food preference discussed. Agreeable to nutrition supplement; options discussed. Pt with pressure injury; agreeable to protein supplement Average po intake adequate to meet patients estimated nutritional needs:   [] Yes     [x] No   [] Unable to determine at this time Diet: DIET NUTRITIONAL SUPPLEMENTS Breakfast, Lunch, Dinner; [employer] CUPS ARTHUR 
DIET NUTRITIONAL SUPPLEMENTS Lunch, Dinner; LUKAS DRINK MIX 
DIET DIABETIC CONSISTENT CARB Regular Food Allergies:  Blueberry, shellfish Current Appetite:   [x] Good: per chart on 10/22     [] Fair     [] Poor     [] Other: 
Appetite/meal intake prior to admission:   [x] Good: usually    [] Fair     [x] Poor: x 2 days PTA    [] Other: 
Feeding Limitations:  [] Swallowing difficulty    [] Chewing difficulty    [] Other: 
Current Meal Intake:  
Patient Vitals for the past 100 hrs: 
 % Diet Eaten 10/23/18 1343 90 % 10/23/18 0946 95 % 10/22/18 1756 0 % 10/22/18 1200 75 % 10/22/18 0934 100 % 10/21/18 1233 75 % 10/20/18 1337 50 % 10/20/18 0900 50 % 10/20/18 0541 400 % BM: 10/22 Skin Integrity:  Stage III right heel pressure injury Edema:   [] No     [x] Yes:  LEs Pertinent Medications: Reviewed: pepcid, ferrous sulfate, folic acid, lantus, SSI, zofran, [place] [facility] [place] Recent Labs 10/23/18 
5756 10/21/18 
9724  142  
K 4.0 4.2 * 108 CO2 26 28 * 153* BUN 26* 25* CREA 1.34* 1.43* CA 8.9 8.6 Intake/Output Summary (Last 24 hours) at 10/23/2018 1451 Last data filed at 10/23/2018 1343 Gross per 24 hour Intake 1180 ml Output 4975 ml Net -3795 ml Anthropometrics: 
Ht Readings from Last 1 Encounters:  
10/17/18 5' 7\" (1.702 m) Last 3 Recorded Weights in this Encounter 10/17/18 1138 Weight: 111.6 kg (246 lb) Body mass index is 38.53 kg/m². Weight History:  Pt reported weight gain due to fluid retention PTA Weight Metrics 10/17/2018 9/17/2018 9/4/2018 8/24/2018 8/7/2018 6/27/2018 5/22/2018 Weight 246 lb 244 lb 245 lb 265 lb 234 lb 234 lb 232 lb BMI 38.53 kg/m2 38.22 kg/m2 38.37 kg/m2 41.5 kg/m2 36.65 kg/m2 36.65 kg/m2 36.34 kg/m2 Admitting Diagnosis: Heel ulcer ([facility] [address] Pertinent PMHx:  Hypertensive heart disease with systolic CHF, dyslipidemia, gout, DM Education Needs:        [x] None identified  [] Identified - Not appropriate at this time  []  Identified and addressed - refer to education log Learning Limitations:   [x] None identified  [] Identified Cultural, Synagogue & ethnic food preferences:  [x] None identified    [] Identified and addressed ESTIMATED NUTRITION NEEDS:  
 
Calories: 2102-7253 kcal (MSJx1.2-1.3) based on  [x] Actual BW: 112 kg      [] IBW Protein: 140-168 gm (1.25-1.5 gm/kg) based on  [x] Actual BW      [] IBW Fluid: 1 mL/kcal 
  
MONITORING & EVALUATION:  
 
Nutrition Goal(s): 1. Po intake of meals will meet >75% of patient estimated nutritional needs within the next 7 days.   Outcome:  [] Met/Ongoing    [x]  Not Met/Progressing    [] New/Initial Goal  
 
Monitoring:   [x] Food and beverage intake   [x] Diet order   [x] Nutrition-focused physical findings   [x] Treatment/therapy   [] Weight   [] Enteral nutrition intake Previous Recommendations (for follow-up assessments only):     [x]   Implemented       []   Not Implemented (RD to address)      [] No Longer Appropriate     [] No Recommendation Made Discharge Planning:  Diabetic diet [x] Participated in care planning, discharge planning, & interdisciplinary rounds as appropriate Frida Flannery RD Pager: [phone]

## 2018-10-23 NOTE — PROGRESS NOTES
OT was unsuccessful x2 with seeing the pt today. On the first attempt around 12:15 p.m., the pt had just received her lunch tray, then on the 2nd attempt around 2:25 p.m., the pt was being transported for PICC line placement.

## 2018-10-23 NOTE — DISCHARGE INSTRUCTIONS
Osteomyelitis: Care Instructions  Your Care Instructions  Osteomyelitis (say \"uw-zrce-rc-ri-rt-NP-tus\") is a bone infection. It is caused by bacteria. The bacteria can infect the bone where it has been injured, or they can be carried through the blood from another area in the body. Osteomyelitis can be a short- or long-term problem. It is treated with antibiotics. You may get the antibiotics as pills or through a needle in a vein (IV). You will probably get treatment in the hospital at first. The type of treatment depends on the type of bacteria causing the infection, the bones affected, and how bad the infection is. Sometimes people need surgery to drain pus from bone or to fix damaged bone. Short-term osteomyelitis that is treated right away usually can be cured. But the long-term form sometimes comes back after treatment. You can help your chances of stopping the infection by taking your medicines as directed. Follow-up care is a key part of your treatment and safety. Be sure to make and go to all appointments, and call your doctor if you are having problems. It's also a good idea to know your test results and keep a list of the medicines you take. How can you care for yourself at home? · Take your antibiotics as directed. Do not stop taking them just because you feel better. You need to take the full course of antibiotics. · Take pain medicines exactly as directed. ? If the doctor gave you a prescription medicine for pain, take it as prescribed. ? If you are not taking a prescription pain medicine, ask your doctor if you can take an over-the-counter medicine. · Do mild exercise and stretching if your doctor says it is okay. This can help keep your bones and muscles healthy. Avoid strenuous work or exercise until your doctor says you can do it. · Consider physical therapy if your doctor suggests it. Physical therapy may help you have a normal range of movement. · Do not smoke.  Smoking can slow healing of the infection. If you need help quitting, talk to your doctor about stop-smoking programs and medicines. These can increase your chances of quitting for good. When should you call for help? Call 911 anytime you think you may need emergency care. For example, call if:    · You have severe bone pain.    Call your doctor now or seek immediate medical care if:    · You continue to have bone pain.     · You have signs of infection, such as:  ? Increased pain, swelling, warmth, or redness. ? Red streaks leading from a wound. ? Pus draining from a wound. ? A fever.    Watch closely for changes in your health, and be sure to contact your doctor if:    · You do not get better as expected. Where can you learn more? Go to http://aren-mirella.info/. Enter E248 in the search box to learn more about \"Osteomyelitis: Care Instructions. \"  Current as of: November 21, 2017  Content Version: 11.8  © 3589-4257 [employer]. Care instructions adapted under license by [employer] (which disclaims liability or warranty for this information). If you have questions about a medical condition or this instruction, always ask your healthcare professional. [place] [address] any warranty or liability for your use of this information. Patient armband removed and shredded  MyChart Activation    Thank you for requesting access to [employer]. Please follow the instructions below to securely access and download your online medical record. [employer] allows you to send messages to your doctor, view your test results, renew your prescriptions, schedule appointments, and more. How Do I Sign Up? 1. In your internet browser, go to [email or web address]  2. Click on the First Time User? Click Here link in the Sign In box. You will be redirect to the New Member Sign Up page. 3. Enter your [employer] Access Code exactly as it appears below.  You will not need to use this code after youve completed the sign-up process. If you do not sign up before the expiration date, you must request a new code. [employer] Access Code: [email or web address]  Expires: 2018 12:34 PM (This is the date your [employer] access code will )    4. Enter the last four digits of your Social Security Number (xxxx) and Date of Birth (mm/dd/yyyy) as indicated and click Submit. You will be taken to the next sign-up page. 5. Create a [employer] ID. This will be your [employer] login ID and cannot be changed, so think of one that is secure and easy to remember. 6. Create a [employer] password. You can change your password at any time. 7. Enter your Password Reset Question and Answer. This can be used at a later time if you forget your password. 8. Enter your e-mail address. You will receive e-mail notification when new information is available in [address]. 9. Click Sign Up. You can now view and download portions of your medical record. 10. Click the Download Summary menu link to download a portable copy of your medical information. Additional Information    If you have questions, please visit the Frequently Asked Questions section of the [employer] website at [email or web address] Remember, [employer] is NOT to be used for urgent needs. For medical emergencies, dial 911. DISCHARGE SUMMARY from Nurse    PATIENT INSTRUCTIONS:    After general anesthesia or intravenous sedation, for 24 hours or while taking prescription Narcotics:  · Limit your activities  · Do not drive and operate hazardous machinery  · Do not make important personal or business decisions  · Do  not drink alcoholic beverages  · If you have not urinated within 8 hours after discharge, please contact your surgeon on call.     Report the following to your surgeon:  · Excessive pain, swelling, redness or odor of or around the surgical area  · Temperature over 100.5  · Nausea and vomiting lasting longer than 4 hours or if unable to take medications  · Any signs of decreased circulation or nerve impairment to extremity: change in color, persistent  numbness, tingling, coldness or increase pain  · Any questions    What to do at Home:  Recommended activity: Activity as tolerated,     If you experience any of the following symptoms increased pain from PICC, please follow up with [address] *  Please give a list of your current medications to your Primary Care Provider. *  Please update this list whenever your medications are discontinued, doses are      changed, or new medications (including over-the-counter products) are added. *  Please carry medication information at all times in case of emergency situations. These are general instructions for a healthy lifestyle:    No smoking/ No tobacco products/ Avoid exposure to second hand smoke  Surgeon General's Warning:  Quitting smoking now greatly reduces serious risk to your health. Obesity, smoking, and sedentary lifestyle greatly increases your risk for illness    A healthy diet, regular physical exercise & weight monitoring are important for maintaining a healthy lifestyle    You may be retaining fluid if you have a history of heart failure or if you experience any of the following symptoms:  Weight gain of 3 pounds or more overnight or 5 pounds in a week, increased swelling in our hands or feet or shortness of breath while lying flat in bed. Please call your doctor as soon as you notice any of these symptoms; do not wait until your next office visit. Recognize signs and symptoms of STROKE:    F-face looks uneven    A-arms unable to move or move unevenly    S-speech slurred or non-existent    T-time-call 911 as soon as signs and symptoms begin-DO NOT go       Back to bed or wait to see if you get better-TIME IS BRAIN. Warning Signs of HEART ATTACK     Call 911 if you have these symptoms:   Chest discomfort.  Most heart attacks involve discomfort in the center of the chest that lasts more than a few minutes, or that goes away and comes back. It can feel like uncomfortable pressure, squeezing, fullness, or pain.  Discomfort in other areas of the upper body. Symptoms can include pain or discomfort in one or both arms, the back, neck, jaw, or stomach.  Shortness of breath with or without chest discomfort.  Other signs may include breaking out in a cold sweat, nausea, or lightheadedness. Don't wait more than five minutes to call 911 - MINUTES MATTER! Fast action can save your life. Calling 911 is almost always the fastest way to get lifesaving treatment. Emergency Medical Services staff can begin treatment when they arrive -- up to an hour sooner than if someone gets to the hospital by car. The discharge information has been reviewed with the patient and spouse. The patient and spouse verbalized understanding. Discharge medications reviewed with the patient and spouse and appropriate educational materials and side effects teaching were provided. ___________________________________________________________________________________________________________________________________  Discharge Instructions    Patient: Wade Ordonez MRN: [id number]  [facility]: [id number]    YOB: 1950  Age: 76 y.o.   Sex: female    DOA: 10/17/2018 LOS:  LOS: 6 days   Discharge Date:      DIET:  Diabetic Diet    ACTIVITY: Bedrest, activity as able  Home health care for Skilled care for DM, Hypertension and medication management, wound care, IV antibiotics, routine PICC line and perez care    ADDITIONAL INFORMATION: If you experience any of the following symptoms but not limited to Fever, chills, nausea, vomiting, diarrhea, change in mentation, falling, bleeding, shortness of breath, chest pain, please call your primary care physician or return to the emergency room if you cannot get hold of your doctor:     FOLLOW UP CARE:  Dr. Polk Found in 5-7 days   Dr. Jyoti Hernandez in 7-10 days  Dr. Blanca Smith (ID) in 3 weeks per Dr. Thomas Mccollum, NP  10/23/2018 3:55 PM

## 2018-10-24 NOTE — PROGRESS NOTES
[facility] Hospitalist Group Progress Note Patient: Narayan Kunz Age: 76 y.o. : 1950 MR#: [id number] SSN: [id number] Date: 10/23/2018 Subjective:  
 
Reports itching and nausea / no vomiting last night after oral antibiotics. At time of visit denies itching / nausea. Denies pain including no pain to right foot. Denies CP, SOB, constipation. Assessment/Plan: 1. Right heel open ulcer with acute on chronic OM and Achilles tendon infection: s/p debridement and wound vac application per podiatry on 10/2018. ID appreciated and s/p PICC line placement today - will need meropenem, daptomycin until 18 per ID. 2. DM-2 with neuropathy: continue Januvia, lantus 5 units and sliding scale. Cont Neurontin. 3. Chronic systolic CHF, EF 39-94%: continue: Coreg 4. HTN, controlled: continue Coreg 5. Hyperlipidemia, continue Pravachol 6. HALI on CPAP 7. CKD stage 3: stable, monitor 8. Dispo - anticipate d/c to home later today after abx complete. Goals of care: Full code Disposition:  [x]PT/OT ordered   [x] Case management referral 
 
Case discussed with:  [x]Patient  []Family  [x]Nursing  []Case Management DVT Prophylaxis:  []Lovenox  []Hep SQ  [x]SCDs  []Coumadin   []On Heparin gtt Objective:  
VS:  
Visit Vitals /79 (BP 1 Location: Left arm, BP Patient Position: At rest) Pulse 70 Temp 97.7 °F (36.5 °C) Resp 20 Ht 5' 7\" (1.702 m) Wt 111.6 kg (246 lb) SpO2 95% Breastfeeding? No  
BMI 38.53 kg/m² Tmax/24hrs: Temp (24hrs), Av.7 °F (36.5 °C), Min:97.4 °F (36.3 °C), Max:98.1 °F (36.7 °C) Intake/Output Summary (Last 24 hours) at 10/23/2018 2025 Last data filed at 10/23/2018 1818 Gross per 24 hour Intake 1140 ml Output 4725 ml Net -3585 ml General:  Awake, alert, NAD Cardiovascular:  RRR Pulmonary: CTA  
GI:  NT, normal BS Extremities:  No edema or cyanosis. Right heel dressing clean and intact. Wound vac connected. Labs:   
Recent Results (from the past 24 hour(s)) GLUCOSE, POC Collection Time: 10/22/18 10:51 PM  
Result Value Ref Range Glucose (POC) 119 (H) 70 - 110 mg/dL CBC WITH AUTOMATED DIFF Collection Time: 10/23/18  2:58 AM  
Result Value Ref Range WBC 4.2 (L) 4.6 - 13.2 K/uL  
 RBC 3.69 (L) 4.20 - 5.30 M/uL  
 HGB 10.3 (L) 12.0 - 16.0 g/dL HCT 31.3 (L) 35.0 - 45.0 % MCV 84.8 74.0 - 97.0 FL  
 MCH 27.9 24.0 - 34.0 PG  
 MCHC 32.9 31.0 - 37.0 g/dL  
 RDW 14.8 (H) 11.6 - 14.5 % PLATELET 725 255 - 124 K/uL MPV 9.2 9.2 - 11.8 FL  
 NEUTROPHILS 57 40 - 73 % LYMPHOCYTES 32 21 - 52 % MONOCYTES 4 3 - 10 % EOSINOPHILS 6 (H) 0 - 5 % BASOPHILS 1 0 - 2 %  
 ABS. NEUTROPHILS 2.4 1.8 - 8.0 K/UL  
 ABS. LYMPHOCYTES 1.4 0.9 - 3.6 K/UL  
 ABS. MONOCYTES 0.2 0.05 - 1.2 K/UL  
 ABS. EOSINOPHILS 0.2 0.0 - 0.4 K/UL  
 ABS. BASOPHILS 0.0 0.0 - 0.1 K/UL  
 DF AUTOMATED METABOLIC PANEL, BASIC Collection Time: 10/23/18  2:58 AM  
Result Value Ref Range Sodium 142 136 - 145 mmol/L Potassium 4.0 3.5 - 5.5 mmol/L Chloride 109 (H) 100 - 108 mmol/L  
 CO2 26 21 - 32 mmol/L Anion gap 7 3.0 - 18 mmol/L Glucose 113 (H) 74 - 99 mg/dL BUN 26 (H) 7.0 - 18 MG/DL Creatinine 1.34 (H) 0.6 - 1.3 MG/DL  
 BUN/Creatinine ratio 19 12 - 20 GFR est AA 48 (L) >60 ml/min/1.73m2 GFR est non-AA 39 (L) >60 ml/min/1.73m2 Calcium 8.9 8.5 - 10.1 MG/DL  
GLUCOSE, POC Collection Time: 10/23/18  8:40 AM  
Result Value Ref Range Glucose (POC) 132 (H) 70 - 110 mg/dL GLUCOSE, POC Collection Time: 10/23/18 11:55 AM  
Result Value Ref Range Glucose (POC) 151 (H) 70 - 110 mg/dL GLUCOSE, POC Collection Time: 10/23/18  3:36 PM  
Result Value Ref Range Glucose (POC) 139 (H) 70 - 110 mg/dL Signed By: Cherelle Alvarado NP October 23, 2018

## 2018-10-24 NOTE — PROGRESS NOTES
Pt has recvd her Meropenum @ 2010 and is now being escorted by writer to car driven by her . She is in her own electric wheel chair. Discharge orders reviewed and signed acknowledged. Her wound vac dressing left in place and she will reconnect tubing and resume her wound vac at home. Her PICC line in intact, her indwelling perez was placed to a leg bag for the ride home per her request. She voices much appreciation.

## 2018-10-26 ENCOUNTER — HOSPITAL ENCOUNTER (EMERGENCY)
Age: 68
Discharge: HOME | End: 2018-10-26
Attending: EMERGENCY MEDICINE
Payer: COMMERCIAL

## 2018-10-26 ENCOUNTER — APPOINTMENT (OUTPATIENT)
Dept: GENERAL RADIOLOGY | Age: 68
End: 2018-10-26
Attending: EMERGENCY MEDICINE
Payer: COMMERCIAL

## 2018-10-26 VITALS
OXYGEN SATURATION: 100 % | RESPIRATION RATE: 18 BRPM | WEIGHT: 246 LBS | DIASTOLIC BLOOD PRESSURE: 70 MMHG | HEART RATE: 87 BPM | TEMPERATURE: 98 F | BODY MASS INDEX: 38.61 KG/M2 | SYSTOLIC BLOOD PRESSURE: 132 MMHG | HEIGHT: 67 IN

## 2018-10-26 DIAGNOSIS — T82.838A BLEEDING FROM PICC LINE, INITIAL ENCOUNTER (HCC): Primary | ICD-10-CM

## 2018-10-26 PROCEDURE — 99284 EMERGENCY DEPT VISIT MOD MDM: CPT

## 2018-10-26 PROCEDURE — 74011250636 HC RX REV CODE- 250/636: Performed by: EMERGENCY MEDICINE

## 2018-10-26 PROCEDURE — 71045 X-RAY EXAM CHEST 1 VIEW: CPT

## 2018-10-26 RX ORDER — HEPARIN SODIUM (PORCINE) LOCK FLUSH IV SOLN 100 UNIT/ML 100 UNIT/ML
500 SOLUTION INTRAVENOUS
Status: COMPLETED | OUTPATIENT
Start: 2018-10-26 | End: 2018-10-26

## 2018-10-26 RX ADMIN — HEPARIN SODIUM (PORCINE) LOCK FLUSH IV SOLN 100 UNIT/ML 500 UNITS: 100 SOLUTION at 21:33

## 2018-10-26 NOTE — ED PROVIDER NOTES
EMERGENCY DEPARTMENT HISTORY AND PHYSICAL EXAM 
 
7:06 PM 
 
 
Date: 10/26/2018 Patient Name: Maricarmen Mcmillan History of Presenting Illness Chief Complaint Patient presents with  
 Other History Provided By: Patient Chief Complaint: PICC line malfunction Duration:  \"this afternoon\" Timing:  Acute Location: right upper extremity Quality: \"sore and tender\" Severity: Moderate Modifying Factors: None Associated Symptoms: right finger tips tingling Additional History (Context): Maricarmen Mcmillan is a 76 y.o. female with PMHx of chronic systolic heart failure, DVT, diabetes, and pyelonephritis who presents with c/o moderate acute onset PICC line malfunction located in the right upper extremity that is \"sore and tender to touch\" with associated Sx of right finger tip tingling described as pins and needles that occurred last night and has subsided. Pt is receiving antibiotics, Daptomycin and Meropenem through her PICC line for treatment of osteomyelitis of the right foot. Pt states this afternoon she noticed her PICC line was filled with blood and \"fluid\". Per the pt she states the tube is in the same position. She states due to the PICC line malfunction she wasn't able to receive for antibiotic dose 4 hrs ago. Her next dose of antibiotics is 2 hours from now and 4 hours from now. She states the area around the PICC line is more tender and sore than it was when she left the hospital. Pt also states blood wasn't able to be extracted from the PICC line 4 days ago. Radiology placed the PICC line per the pt. Denies any further complaints or symptoms at the moment. PCP: Stefany Galvan MD 
 
Current Outpatient Medications Medication Sig Dispense Refill  ondansetron (ZOFRAN ODT) 4 mg disintegrating tablet 1 Tab by SubLINGual route every six (6) hours as needed. 10 Tab 0  
 meropenem 1 g IV syringe 1 g by IntraVENous route every eight (8) hours for 45 days.  1 Dose 0  
  baclofen (LIORESAL) 10 mg tablet Take 1 Tab by mouth three (3) times daily. 1 Tab 0  
 hydrOXYzine pamoate (VISTARIL) 25 mg capsule Take 1 Cap by mouth four (4) times daily as needed for Itching for up to 14 days. 12 Cap 0  cholecalciferol, VITAMIN D3, (VITAMIN D3) 5,000 unit tab tablet Take  by mouth daily.  carvedilol (COREG) 6.25 mg tablet 6.25 mg two (2) times a day.  SITagliptin (JANUVIA) 50 mg tablet Take 50 mg by mouth daily.  ferrous sulfate 325 mg (65 mg iron) tablet Take 1 Tab by mouth two (2) times daily (with meals). 100 Tab 0  
 Lactobacillus Acidoph & Bulgar (FLORANEX) 1 million cell tab tablet Take 1 Tab by mouth two (2) times a day. 60 Tab 0  
 furosemide (LASIX) 40 mg tablet 1 tab daily  Indications: Edema (Patient taking differently: 40 mg. 1 tab daily  Indications: Edema, patient taking 60mg for the past 2 weeks) 60 Tab 0  
 gabapentin (NEURONTIN) 300 mg capsule Take 2 Caps by mouth two (2) times a day. Indications: NEUROPATHIC PAIN 100 Cap 0  
 insulin lispro (HUMALOG) 100 unit/mL injection See sliding scale (Patient taking differently: 4 Units Before breakfast, lunch, and dinner. See sliding scale) 1 Vial 0  
 folic acid (FOLVITE) 1 mg tablet Take 1 Tab by mouth daily. 30 Tab 0  
 insulin glargine (LANTUS) 100 unit/mL injection 5 Units by SubCUTAneous route nightly. Indications: type 2 diabetes mellitus (Patient taking differently: 20 Units by SubCUTAneous route nightly.) 1 Vial 0  
 famotidine (PEPCID) 20 mg tablet Take 1 Tab by mouth nightly. (Patient taking differently: Take 20 mg by mouth daily as needed.) 30 Tab 0  pravastatin (PRAVACHOL) 40 mg tablet Take 40 mg by mouth nightly. Indications: DYSLIPIDEMIA Past History Past Medical History: 
Past Medical History:  
Diagnosis Date  Acute paraplegia ([facility] 4/20/2017  Benign hypertensive heart disease with systolic CHF, NYHA class 2 ([facility] 9/5/2012  Biventricular implantable cardioverter-defibrillator in situ 04/28/2005 Upgraded to BiV AICD; gen change 4/2008; pocket revision 10/2009; Abdominal - done on 8/22/2012 by Dr. Mili Grissom  Cardiac cath 08/15/1996 Patent coronaries. Elev LVEDP. EF 50-55%.  Cardiac echocardiogram 06/23/2015 Ltd study. EF 45-50%. Mild, diffuse hypk. Severe apical hypk. No mass or thrombus was clearly identified, although imaging was suboptimal.    
 Cardiac nuclear imaging test 06/19/2015 Fixed distal apical, distal septal defect more likely due to RV pacing than prior infarct. No ischemia. EF 46%. RWMA c/w RV pacing. Nondiagnostic EKG on pharm stress test.    
 Cardiovascular lower extremity venous duplex 09/04/2012 Acute, non-occlusive DVT in CFV on right. No DVT on left. No superficial thrombosis bilaterally.  Cardiovascular upper extremity venous duplex 08/27/2012 DVT in axillary vein on left. Left subclavian was not visualized.  Chronic anemia 9/5/2012  Chronic systolic heart failure ([address]  Decreased calculated glomerular filtration rate (GFR) 3/30/2017 Calculated GFR equivalent to that of CKD stage 3 = 30-59 ml/min  Diabetic neuropathy associated with type 2 diabetes mellitus ([address] 6/28/2011  Difficult airway for intubation 08/22/2012  
 see anesthesia airway note  Dyslipidemia 6/28/2011  Gout  History of complete heart block 6/28/2011  History of Coumadin therapy Anticoagulation for DVT of the LUE; Discontinued on 3/30/2017  History of deep venous thrombosis 9/5/2012 Left upper extremity  History of pyelonephritis 3/30/2017  Left bundle branch block (LBBB) on electrocardiogram 6/28/2011  Nonischemic cardiomyopathy ([address] 6/28/2011  Obesity (BMI 35.0-39.9 without comorbidity) 3/13/2017  Obstructive sleep apnea on CPAP 2/7/2012  Psoas abscess, right ([address] 4/20/2017  Psoas hematoma, right, secondary to anticoagulant therapy 3/30/2017  Type 2 diabetes mellitus with diabetic neuropathy ([facility] 6/28/2011 Past Surgical History: 
Past Surgical History:  
Procedure Laterality Date  HX CARPAL TUNNEL RELEASE  4/07 right [address]  HX OTHER SURGICAL  6/11/2012 AICD revision  HX PACEMAKER  4/28/2005 [employer] AICD Family History: 
Family History Problem Relation Age of Onset  Cancer Father Leukemia Social History: 
Social History Tobacco Use  Smoking status: Never Smoker  Smokeless tobacco: Never Used Substance Use Topics  Alcohol use: No  
 Drug use: No  
 
 
Allergies: Allergies Allergen Reactions  Vancomycin Itching  Ampicillin Itching  Bactrim [Sulfamethoxazole-Trimethoprim] Unknown (comments)  Blueberry Swelling Causes throat swelling  Ciprofloxacin Itching  Codeine Other (comments) Jumpy feeling  Crestor [Rosuvastatin] Itching  Darvocet A500 [Propoxyphene N-Acetaminophen] Itching  Demerol [Meperidine] Itching  Levaquin [Levofloxacin] Itching  Lipitor [Atorvastatin] Myalgia  Magnesium Oxide Itching  
  nausea  Minocin [Minocycline] Unknown (comments)  Pcn [Penicillins] Itching  Pravachol [Pravastatin] Swelling Swelling in mouth. Not allergic per patient, takes at home  Shellfish Derived Unknown (comments)  Sulfa (Sulfonamide Antibiotics) Itching  Ultracet [Tramadol-Acetaminophen] Itching  Vicodin [Hydrocodone-Acetaminophen] Unknown (comments)  Vytorin 10-10 [Ezetimibe-Simvastatin] Myalgia  Percodan [Oxycodone Hcl-Oxycodone-Asa] Itching Review of Systems Review of Systems Constitutional: Negative for activity change and appetite change. HENT: Negative for congestion. Eyes: Negative for visual disturbance. Respiratory: Negative for cough and shortness of breath. Cardiovascular: Negative for chest pain. Gastrointestinal: Negative for abdominal pain, diarrhea, nausea and vomiting. Genitourinary: Negative for dysuria. Musculoskeletal: Negative for arthralgias and myalgias. Positive for PICC line malfunction (right upper extremity) Skin: Negative for rash. Neurological: Negative for weakness and numbness. Positive for tingling (right finger tips) Physical Exam  
 
Visit Vitals /70 (BP 1 Location: Left arm, BP Patient Position: At rest) Pulse 87 Temp 98 °F (36.7 °C) Resp 18 Ht 5' 7\" (1.702 m) Wt 111.6 kg (246 lb) SpO2 100% BMI 38.53 kg/m² Physical Exam  
Constitutional: She is oriented to person, place, and time. She appears well-developed and well-nourished. HENT:  
Head: Normocephalic and atraumatic. Mouth/Throat: Oropharynx is clear and moist.  
Eyes: Conjunctivae are normal.  
Neck: Normal range of motion. Neck supple. No JVD present. Cardiovascular: Normal rate, regular rhythm, normal heart sounds, intact distal pulses and normal pulses. No murmur heard. Pulmonary/Chest: Effort normal and breath sounds normal.  
Abdominal: Soft. Bowel sounds are normal. She exhibits no distension. There is no tenderness. Musculoskeletal: Normal range of motion. She exhibits no deformity. PICC line site in right upper extremity Blood soaked into Biopatch Line and dressing intact Minimal tenderness Normal sensation Lymphadenopathy:  
  She has no cervical adenopathy. Neurological: She is alert and oriented to person, place, and time. No sensory deficit. Coordination normal.  
Skin: Skin is warm and dry. No rash noted. Psychiatric: She has a normal mood and affect. Nursing note and vitals reviewed. Diagnostic Study Results Labs - No results found for this or any previous visit (from the past 12 hour(s)). Radiologic Studies -  
XR CHEST PORT    (Results Pending) Medical Decision Making I am the first provider for this patient. I reviewed the vital signs, available nursing notes, past medical history, past surgical history, family history and social history. Vital Signs-Reviewed the patient's vital signs. Pulse Oximetry Analysis -  100% on room air (Interpretation) normal 
 
Records Reviewed: Nursing Notes (Time of Review: 7:06 PM) 
 
ED Course: Progress Notes, Reevaluation, and Consults: 
 
10:01 PM 
Patient was offered her dose of antibiotics and she can be discharged. Provider Notes (Medical Decision Making):  
77 yo F with concern for problem with PICC line, in place for treatment of osteomyelitis. Some bleeding apparent around tube, but now discontinued. No palpable hematoma, no active bleeding, and neurovascularly intact. PICC line appears to be intact. Differential Diagnosis: will evaluate line position and function, but do nto suspect complication at this time. Testing: xr 
Treatments: heparin flush and check function XR shows good position. Flushes well. Discussed with patient, who would prefer to do her antibiotics at home tonight. The patient will be discharged home. Findings were discussed at length and questions were answered. Information on all newly prescribed medications was given. the patient was instructed to follow-up with her PCP, or return to the Emergency Department with any worsened symptoms or concerns. Return precautions were given. Diagnosis Clinical Impression: 1. Bleeding from PICC line, initial encounter ([facility] Disposition: discharged Follow-up Information Follow up With Specialties Details Why Contact Info Joyce Rosado MD Internal Medicine  As needed [address] [place] [address] [address] [place] [address] 
[phone] [facility] EMERGENCY DEPT Emergency Medicine  If symptoms worsen [address] [id number] 
[phone] Medication List  
  
 ASK your doctor about these medications   
baclofen 10 mg tablet Commonly known as:  LIORESAL Take 1 Tab by mouth three (3) times daily. carvedilol 6.25 mg tablet Commonly known as:  COREG 
  
cholecalciferol (VITAMIN D3) 5,000 unit Tab tablet Commonly known as:  VITAMIN D3 
  
famotidine 20 mg tablet Commonly known as:  PEPCID Take 1 Tab by mouth nightly. ferrous sulfate 325 mg (65 mg iron) tablet Take 1 Tab by mouth two (2) times daily (with meals). folic acid 1 mg tablet Commonly known as:  [employer] Take 1 Tab by mouth daily. furosemide 40 mg tablet Commonly known as:  LASIX 
1 tab daily  Indications: Edema 
  
gabapentin 300 mg capsule Commonly known as:  NEURONTIN Take 2 Caps by mouth two (2) times a day. Indications: NEUROPATHIC PAIN 
  
hydrOXYzine pamoate 25 mg capsule Commonly known as:  VISTARIL Take 1 Cap by mouth four (4) times daily as needed for Itching for up to 14 days. insulin glargine 100 unit/mL injection Commonly known as:  LANTUS U-100 INSULIN 
5 Units by SubCUTAneous route nightly. Indications: type 2 diabetes mellitus 
  
insulin lispro 100 unit/mL injection Commonly known as:  HUMALOG See sliding scale JANUVIA 50 mg tablet Generic drug:  SITagliptin Lactobacillus Acidoph & Bulgar 1 million cell Tab tablet Commonly known as:  Jessa Shackle Take 1 Tab by mouth two (2) times a day. meropenem 1 g IV syringe 1 g by IntraVENous route every eight (8) hours for 45 days. ondansetron 4 mg disintegrating tablet Commonly known as:  ZOFRAN ODT 
1 Tab by SubLINGual route every six (6) hours as needed. pravastatin 40 mg tablet Commonly known as:  PRAVACHOL 
  
  
 
_______________________________ Attestations: 
Scribe Attestation Maude Cobian acting as a scribe for and in the presence of Paul Clark MD     
October 26, 2018 at 7:06 PM 
    
Provider Attestation:     
I personally performed the services described in the documentation, reviewed the documentation, as recorded by the scribe in my presence, and it accurately and completely records my words and actions. October 26, 2018 at 7:06 PM - Manjula Doty MD   
_______________________________

## 2018-10-26 NOTE — ED TRIAGE NOTES
Patient states her home health nurse told her to come here because her PICC line is not in place, he told her it looks like it is coming out.

## 2018-10-26 NOTE — ED NOTES
Bedside shift change report given to Aurelio James RN (oncoming nurse) by Fernanda Robert RN (offgoing nurse). Report included the following information SBAR, ED Summary and MAR.

## 2018-11-06 ENCOUNTER — OFFICE VISIT (OUTPATIENT)
Dept: INTERNAL MEDICINE CLINIC | Age: 68
End: 2018-11-06

## 2018-11-06 ENCOUNTER — HOSPITAL ENCOUNTER (OUTPATIENT)
Dept: LAB | Age: 68
Discharge: HOME | End: 2018-11-06
Payer: COMMERCIAL

## 2018-11-06 VITALS
SYSTOLIC BLOOD PRESSURE: 124 MMHG | RESPIRATION RATE: 16 BRPM | TEMPERATURE: 98.7 F | HEIGHT: 67 IN | HEART RATE: 78 BPM | BODY MASS INDEX: 38.53 KG/M2 | DIASTOLIC BLOOD PRESSURE: 64 MMHG | OXYGEN SATURATION: 99 %

## 2018-11-06 DIAGNOSIS — E11.40 TYPE 2 DIABETES MELLITUS WITH DIABETIC NEUROPATHY, WITH LONG-TERM CURRENT USE OF INSULIN (HCC): ICD-10-CM

## 2018-11-06 DIAGNOSIS — M86.9 OSTEOMYELITIS OF RIGHT FOOT, UNSPECIFIED TYPE (HCC): Primary | ICD-10-CM

## 2018-11-06 DIAGNOSIS — Z79.4 TYPE 2 DIABETES MELLITUS WITH DIABETIC NEUROPATHY, WITH LONG-TERM CURRENT USE OF INSULIN (HCC): ICD-10-CM

## 2018-11-06 DIAGNOSIS — M86.9 OSTEOMYELITIS OF RIGHT FOOT, UNSPECIFIED TYPE (HCC): ICD-10-CM

## 2018-11-06 LAB
ALBUMIN SERPL-MCNC: 3.5 G/DL (ref 3.4–5)
ALBUMIN/GLOB SERPL: 0.8 {RATIO} (ref 0.8–1.7)
ALP SERPL-CCNC: 129 U/L (ref 45–117)
ALT SERPL-CCNC: 46 U/L (ref 13–56)
ANION GAP SERPL CALC-SCNC: 8 MMOL/L (ref 3–18)
AST SERPL-CCNC: 65 U/L (ref 15–37)
BASOPHILS # BLD: 0 K/UL (ref 0–0.1)
BASOPHILS NFR BLD: 1 % (ref 0–2)
BILIRUB SERPL-MCNC: 1.1 MG/DL (ref 0.2–1)
BUN SERPL-MCNC: 30 MG/DL (ref 7–18)
BUN/CREAT SERPL: 19 (ref 12–20)
CALCIUM SERPL-MCNC: 10 MG/DL (ref 8.5–10.1)
CHLORIDE SERPL-SCNC: 103 MMOL/L (ref 100–108)
CK SERPL-CCNC: 1200 U/L (ref 26–192)
CO2 SERPL-SCNC: 27 MMOL/L (ref 21–32)
CREAT SERPL-MCNC: 1.57 MG/DL (ref 0.6–1.3)
DIFFERENTIAL METHOD BLD: ABNORMAL
EOSINOPHIL # BLD: 0.4 K/UL (ref 0–0.4)
EOSINOPHIL NFR BLD: 5 % (ref 0–5)
ERYTHROCYTE [DISTWIDTH] IN BLOOD BY AUTOMATED COUNT: 15 % (ref 11.6–14.5)
ERYTHROCYTE [SEDIMENTATION RATE] IN BLOOD: 72 MM/HR (ref 0–30)
GLOBULIN SER CALC-MCNC: 4.5 G/DL (ref 2–4)
GLUCOSE SERPL-MCNC: 98 MG/DL (ref 74–99)
HCT VFR BLD AUTO: 36.1 % (ref 35–45)
HGB BLD-MCNC: 11.8 G/DL (ref 12–16)
LYMPHOCYTES # BLD: 2.1 K/UL (ref 0.9–3.6)
LYMPHOCYTES NFR BLD: 28 % (ref 21–52)
MCH RBC QN AUTO: 27.4 PG (ref 24–34)
MCHC RBC AUTO-ENTMCNC: 32.7 G/DL (ref 31–37)
MCV RBC AUTO: 83.8 FL (ref 74–97)
MONOCYTES # BLD: 0.6 K/UL (ref 0.05–1.2)
MONOCYTES NFR BLD: 7 % (ref 3–10)
NEUTS SEG # BLD: 4.5 K/UL (ref 1.8–8)
NEUTS SEG NFR BLD: 59 % (ref 40–73)
PLATELET # BLD AUTO: 242 K/UL (ref 135–420)
PMV BLD AUTO: 9.9 FL (ref 9.2–11.8)
POTASSIUM SERPL-SCNC: 4.1 MMOL/L (ref 3.5–5.5)
PROT SERPL-MCNC: 8 G/DL (ref 6.4–8.2)
RBC # BLD AUTO: 4.31 M/UL (ref 4.2–5.3)
SODIUM SERPL-SCNC: 138 MMOL/L (ref 136–145)
WBC # BLD AUTO: 7.6 K/UL (ref 4.6–13.2)

## 2018-11-06 PROCEDURE — 85025 COMPLETE CBC W/AUTO DIFF WBC: CPT | Performed by: INTERNAL MEDICINE

## 2018-11-06 PROCEDURE — 82550 ASSAY OF CK (CPK): CPT | Performed by: INTERNAL MEDICINE

## 2018-11-06 PROCEDURE — 80053 COMPREHEN METABOLIC PANEL: CPT | Performed by: INTERNAL MEDICINE

## 2018-11-06 PROCEDURE — 85652 RBC SED RATE AUTOMATED: CPT | Performed by: INTERNAL MEDICINE

## 2018-11-06 RX ORDER — HYDROXYZINE HYDROCHLORIDE 10 MG/1
10 TABLET, FILM COATED ORAL
Qty: 90 TAB | Refills: 1 | Status: SHIPPED | OUTPATIENT
Start: 2018-11-06 | End: 2018-11-16

## 2018-11-06 NOTE — PROGRESS NOTES
CORINA Gonzalez is a 76 y.o. female with relevant past medical history ofCHF, HTN, DM2 with neuropathy (HbA1c 8.7 10/2018), h/o BL LE DVT s/p IVC fileter 5/2018, history of sepsis due to GBS bacteremia secondary to psoas hematoma/abscess complicated by spinal cord infarct, causing paraplegia?(patient reports weakness not paralysis), back in 4/2018. Here for outpatient ID follow up after recent admission for R heel acute on chronic osteomyelitis. Apparently the patient had been admitted to the hospital in August,16-24, 2018 for R foot heel ulcer complicated by acute osteomyelitis, and undergo radical debridement of tissue and bone, with wound vac placement (8/20/18), neg intraoperative cultures. Discharged on oral antibiotics- vantin- cefpodoxime to take 27 more days. The patient says she could not tolerate this antibiotic due to side effects and says she might have taken it maybe for only a week more upon discharge. She said that for a few days her heels seemed to be doing well, but then she started having malaise, fevers, chills, and worsening right heel ulcer. She was admitted again on 10/17/18 until 10/23/18, and was diagnosed again with calcaneus OM, achilles tendon infection, and underwent debridement and wound vac application by podiatry on 10/19/18. Wound cultures on admisson grew ESBL- Kleb penumo, MRSA. VSE and dipheteroids. Intraoperative cultures from 10/19/18 grew ESBL Kleb pneumo, VSE, diptheroids and peptstreptococcus. The patient was initially treated with cefepime and linezolid (10/18-10/21) then briefly transitioned to amox-clav on 10/22/18, ID inpatient, Dr. Kathy Quinones was consulted, who recommended to treat with daptomycin and meropenem until 12/7/18, given patient microbacterial findings and antibiotic allergies including vancomycin, ampicillin, FQ. The patient reports mild itching with meropenem relieved by atarax and would like a refill. Denies any respiratory or oral symptoms. Labs from hospital 
CRP 1.9 (10/30) from 2.4 (10/19)  (10/30/18) Cr 1.2 (10/30) The patient is due for repeat labs today. ROS As above included in HPI. Otherwise 11 point review of systems negative including constitutional, skin, HENT, eyes, respiratory, cardiovascular, gastrointestinal, genitourinary, musculoskeletal, endocrine, hematologic, allergy, and neurologic. Past Medical History Past Medical History:  
Diagnosis Date  Acute paraplegia ([address] 4/20/2017  Benign hypertensive heart disease with systolic CHF, NYHA class 2 ([address] 9/5/2012  Biventricular implantable cardioverter-defibrillator in situ 04/28/2005 Upgraded to BiV AICD; gen change 4/2008; pocket revision 10/2009; Abdominal - done on 8/22/2012 by Dr. Chan Wilson  Cardiac cath 08/15/1996 Patent coronaries. Elev LVEDP. EF 50-55%.  Cardiac echocardiogram 06/23/2015 Ltd study. EF 45-50%. Mild, diffuse hypk. Severe apical hypk. No mass or thrombus was clearly identified, although imaging was suboptimal.    
 Cardiac nuclear imaging test 06/19/2015 Fixed distal apical, distal septal defect more likely due to RV pacing than prior infarct. No ischemia. EF 46%. RWMA c/w RV pacing. Nondiagnostic EKG on pharm stress test.    
 Cardiovascular lower extremity venous duplex 09/04/2012 Acute, non-occlusive DVT in CFV on right. No DVT on left. No superficial thrombosis bilaterally.  Cardiovascular upper extremity venous duplex 08/27/2012 DVT in axillary vein on left. Left subclavian was not visualized.  Chronic anemia 9/5/2012  Chronic systolic heart failure ([address]  Decreased calculated glomerular filtration rate (GFR) 3/30/2017 Calculated GFR equivalent to that of CKD stage 3 = 30-59 ml/min  Diabetic neuropathy associated with type 2 diabetes mellitus ([address] 6/28/2011  Difficult airway for intubation 08/22/2012  
 see anesthesia airway note  Dyslipidemia 6/28/2011  Gout  History of complete heart block 6/28/2011  History of Coumadin therapy Anticoagulation for DVT of the LUE; Discontinued on 3/30/2017  History of deep venous thrombosis 9/5/2012 Left upper extremity  History of pyelonephritis 3/30/2017  Left bundle branch block (LBBB) on electrocardiogram 6/28/2011  Nonischemic cardiomyopathy ([facility] 6/28/2011  Obesity (BMI 35.0-39.9 without comorbidity) 3/13/2017  Obstructive sleep apnea on CPAP 2/7/2012  Psoas abscess, right ([address] 4/20/2017  Psoas hematoma, right, secondary to anticoagulant therapy 3/30/2017  Type 2 diabetes mellitus with diabetic neuropathy ([facility] 6/28/2011 Past Surgical History:  
Procedure Laterality Date  HX CARPAL TUNNEL RELEASE  4/07 right [address]  HX OTHER SURGICAL  6/11/2012 AICD revision  HX PACEMAKER  4/28/2005 [employer] AICD Family History Family History Problem Relation Age of Onset  Cancer Father Leukemia Social History She  reports that  has never smoked. she has never used smokeless tobacco.  
Social History Substance and Sexual Activity Alcohol Use No  
 
 
Immunization History There is no immunization history on file for this patient. Allergies Allergies Allergen Reactions  Vancomycin Itching  Ampicillin Itching  Bactrim [Sulfamethoxazole-Trimethoprim] Unknown (comments)  Blueberry Swelling Causes throat swelling  Ciprofloxacin Itching  Codeine Other (comments) Jumpy feeling  Crestor [Rosuvastatin] Itching  Darvocet A500 [Propoxyphene N-Acetaminophen] Itching  Demerol [Meperidine] Itching  Levaquin [Levofloxacin] Itching  Lipitor [Atorvastatin] Myalgia  Magnesium Oxide Itching  
  nausea  Minocin [Minocycline] Unknown (comments)  Pcn [Penicillins] Itching  Pravachol [Pravastatin] Swelling Swelling in mouth. Not allergic per patient, takes at home  Shellfish Derived Unknown (comments)  Sulfa (Sulfonamide Antibiotics) Itching  Ultracet [Tramadol-Acetaminophen] Itching  Vicodin [Hydrocodone-Acetaminophen] Unknown (comments)  Vytorin 10-10 [Ezetimibe-Simvastatin] Myalgia  Percodan [Oxycodone Hcl-Oxycodone-Asa] Itching Medications Current Outpatient Medications Medication Sig  
 hydrOXYzine HCl (ATARAX) 10 mg tablet Take 1 Tab by mouth three (3) times daily as needed for Itching for up to 10 days.  ondansetron (ZOFRAN ODT) 4 mg disintegrating tablet 1 Tab by SubLINGual route every six (6) hours as needed.  meropenem 1 g IV syringe 1 g by IntraVENous route every eight (8) hours for 45 days.  cholecalciferol, VITAMIN D3, (VITAMIN D3) 5,000 unit tab tablet Take  by mouth daily.  carvedilol (COREG) 6.25 mg tablet 6.25 mg two (2) times a day.  Lactobacillus Acidoph & Bulgar (FLORANEX) 1 million cell tab tablet Take 1 Tab by mouth two (2) times a day.  furosemide (LASIX) 40 mg tablet 1 tab daily  Indications: Edema (Patient taking differently: 40 mg. 1 tab daily  Indications: Edema, patient taking 60mg for the past 2 weeks)  gabapentin (NEURONTIN) 300 mg capsule Take 2 Caps by mouth two (2) times a day. Indications: NEUROPATHIC PAIN  
 insulin lispro (HUMALOG) 100 unit/mL injection See sliding scale (Patient taking differently: 4 Units Before breakfast, lunch, and dinner. See sliding scale)  folic acid (FOLVITE) 1 mg tablet Take 1 Tab by mouth daily.  insulin glargine (LANTUS) 100 unit/mL injection 5 Units by SubCUTAneous route nightly. Indications: type 2 diabetes mellitus (Patient taking differently: 20 Units by SubCUTAneous route nightly.)  baclofen (LIORESAL) 10 mg tablet Take 1 Tab by mouth three (3) times daily.  hydrOXYzine pamoate (VISTARIL) 25 mg capsule Take 1 Cap by mouth four (4) times daily as needed for Itching for up to 14 days.  SITagliptin (JANUVIA) 50 mg tablet Take 50 mg by mouth daily.  ferrous sulfate 325 mg (65 mg iron) tablet Take 1 Tab by mouth two (2) times daily (with meals).  famotidine (PEPCID) 20 mg tablet Take 1 Tab by mouth nightly. (Patient taking differently: Take 20 mg by mouth daily as needed.) No current facility-administered medications for this visit. Visit Vitals /64 (BP 1 Location: Left arm, BP Patient Position: Sitting) Pulse 78 Temp 98.7 °F (37.1 °C) (Oral) Resp 16 Ht 5' 7\" (1.702 m) LMP  (LMP Unknown) SpO2 99% BMI 38.53 kg/m² Body mass index is 38.53 kg/m². Physical Exam  
Constitutional: She is oriented to person, place, and time and well-developed, well-nourished, and in no distress. No distress. HENT:  
Head: Normocephalic and atraumatic. Eyes: Pupils are equal, round, and reactive to light. Cardiovascular: Normal rate and regular rhythm. Pulmonary/Chest: Effort normal and breath sounds normal.  
Abdominal: Soft. Musculoskeletal: She exhibits edema (R heel surrounding edema, no significant warmth or erythema). She exhibits no tenderness. Right heel dressed with clean dressing, wound vac in place Neurological: She is alert and oriented to person, place, and time. Skin: She is not diaphoretic. Nursing note and vitals reviewed. REVIEW OF DATA Labs Admission on 10/17/2018, Discharged on 10/23/2018 No results displayed because visit has over 200 results. CT Results (most recent): 
Results from Hospital Encounter encounter on 09/04/18 CTA CHEST W OR W WO CONT Narrative EXAMINATION: CTA chest pulmonary INDICATION: Acute chest pain, shortness of breath COMPARISON: None TECHNIQUE: CT angiography of the pulmonary arteries performed following 78 cc IV Isovue 370 with 3-D MIP multiplanar reformations. All CT scans at this facility 
are performed using dose optimization technique as appropriate to a performed exam, to include automated exposure control, adjustment of the mA and/or kV 
according to patient size (including appropriate matching first site specific 
examinations), or use of iterative reconstruction technique. FINDINGS: 
 
Evaluation limited by streak artifact from arms at side. Motion artifact further 
limits evaluation. Cardiovascular: No pulmonary arterial filling defects identified to suggest 
pulmonary embolus. Smaller segmental and subsegmental arteries not as well 
evaluated. Thoracic aorta is normal in course and caliber. Heart size within 
normal limits. Post median sternotomy. Epicardial leads noted from inferior 
approach. Mediastinum: Enlarged heterogeneous thyroid. No mediastinal adenopathy by size 
criteria. Small hiatal hernia. Pleura: No effusions or pneumothorax. Lungs/airways: Motion limits evaluation. Bandlike consolidation in the right 
posterior lower lobe, likely atelectasis/scar, and linear atelectasis/scar in 
the left lower lobe. Upper abdomen: Status post cholecystectomy. Spleen is slightly prominent in 
size. Small hiatal hernia. Probable bilateral renal cysts and right renal 
atrophy and scarring, incompletely evaluated. Miscellaneous: Superficial soft tissues unremarkable. Bones: No acute osseous findings. Impression IMPRESSION: 
 
1. No pulmonary embolus identified. Smaller segmental and subsegmental arteries 
not as well evaluated. See limitations above. 2. Bandlike consolidation in the right lower lobe likely atelectasis/scar. Additional suspected atelectasis or scar in the left lower lobe. 2. Enlarged heterogeneous thyroid. 
-Small hiatal hernia. -Status post cholecystectomy and mildly prominent spleen. 
-Bilateral probable renal cysts and right renal atrophy/scarring, incompletely 
evaluated. XR Results (most recent): 
Results from Hospital Encounter encounter on 10/26/18 XR CHEST PORT  Narrative Examination: Portable AP chest  
 History: PICC position Comparison: September 4, 2018 Findings: Right arm PICC with the tip in the central right axillary vein region. Lungs are grossly clear. Heart is enlarged. Epicardial leads are seen. Tortuous 
thoracic aorta. Impression Impression: 1. Right arm PICC with the tip in the central right axillary vein region. CT All Micro Results None DIAGNOSIS AND PLAN Patient Active Problem List  
Diagnosis Code  Nonischemic cardiomyopathy (HCC) I42.8  History of complete heart block Z86.79  Biventricular implantable cardioverter-defibrillator in situ Z95.810  Left bundle branch block (LBBB) on electrocardiogram I44.7  Type 2 diabetes mellitus with diabetic neuropathy ([facility]) E11.40  Dyslipidemia E78.5  Diabetic neuropathy associated with type 2 diabetes mellitus ([facility]) E11.40  Obstructive sleep apnea on CPAP G47.33, Z99.89  
 AICD generator infection ([address] T82. 7XXA  Difficult airway for intubation T88. 4XXA  Benign hypertensive heart disease with systolic CHF, NYHA class 2 ([facility]) I11.0, I50.20  Decreased calculated glomerular filtration rate (GFR) R94.4  Chronic anemia D64.9  
 History of deep venous thrombosis Z86.718  Anticoagulated on Coumadin Z51.81, Z79.01  
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  
 Obesity (BMI 35.0-39.9 without comorbidity) E66.9  
 History of pyelonephritis Z87.448  Iliopsoas muscle hematoma S70.10XA  Psoas hematoma, right, secondary to anticoagulant therapy S30. Bev Gutierrez  Impaired mobility and ADLs Z74.09  
 History of Coumadin therapy Z92.29  
 Gout M10.9  Acute paraplegia (HCC) G82.20  Sepsis ([address] A41.9  Psoas abscess, right ([address] K68.12  
 Krueger catheter in place on admission Z96.0  
 Urinary tract infection due to Enterococcus N39.0, B95.2  Group B streptococcal infection A49.1  Hypervolemia E87.70  Anemia D64.9  Biventricular cardiac pacemaker in situ Z95.0  Neurogenic bladder N31.9  Type 2 diabetes with nephropathy (HCC) E11.21  
 Obesity (BMI 30.0-34. 9) E66.9  Severe obesity (BMI 35.0-39. 9) with comorbidity ([address] E66.01  
 Osteomyelitis ([address] M86.9  Foot osteomyelitis, right ([address] M86.9  Heel ulcer ([address] L97.409 1. Osteomyelitis of right foot, unspecified type ([address] 2. Type 2 diabetes mellitus with diabetic neuropathy, with long-term current use of insulin ([address] R heel acute on chronic osteomyelitis. Admitted from 10/17/18 until 10/23/18, for R foot calcaneus OM, achilles tendon infection, s/p debridement and wound vac application by podiatry on 10/19/18. Wound cultures on 10/17/18 grew ESBL- Kleb penumo, MRSA. VSE and dipheteroids. Intraoperative cultures from 10/19/18 grew ESBL Kleb pneumo, VSE, diptheroids and peptstreptococcus. The patient was initially treated with cefepime and linezolid (10/18-10/21) then briefly transitioned to amox-clav on 10/22/18, ID inpatient, Dr. Jacqueline Echeverria was consulted, who recommended to treat with daptomycin and meropenem until 12/7/18, given patient microbacterial findings and antibiotic allergies including vancomycin, ampicillin, FQ. The patient reports mild itching with meropenem relieved by atarax and would like a refill. Denies any respiratory or oral symptoms. Labs from hospital 
CRP 1.9 (10/30) from 2.4 (10/19)  (10/30/18) Cr 1.2 (10/30) The patient is due for repeat labs today. Labs to be done in clinic today If CPK or creatinine trending up we may need to adjust daptomycin dose vs modifying antibiotic entirely. C/w meropenem as prescribed, ataraz PRN. C/w wound vac/wound care/podiatry follow up closely Follow up in 4 weeks or sooner depending on clinical progress and lab trend. Diabetes strict control discussed with patient, adherence to medications in addition to diet recommended emphasized. Follow-up Disposition: Return in about 4 weeks (around 12/4/2018). Joanne Noble MD

## 2018-11-06 NOTE — PROGRESS NOTES
ROOM # 11 
 
Millicent Rater presents today for Chief Complaint Patient presents with  Follow-up Heel ulcer Rhesa Rater preferred language for health care discussion is english/other. Is someone accompanying this pt? No 
 
Is the patient using any DME equipment during OV? No 
 
Depression Screening: PHQ over the last two weeks 11/6/2018 3/24/2014 Little interest or pleasure in doing things Not at all Not at all Feeling down, depressed, irritable, or hopeless Not at all Not at all Total Score PHQ 2 0 0 Learning Assessment: 
Learning Assessment 11/1/2016 4/11/2016 3/2/2015 3/24/2014 PRIMARY LEARNER Patient Patient Patient Patient HIGHEST LEVEL OF EDUCATION - PRIMARY LEARNER  - - - SOME COLLEGE  
BARRIERS PRIMARY LEARNER - - - NONE  
CO-LEARNER CAREGIVER - - - No  
PRIMARY LANGUAGE ENGLISH ENGLISH ENGLISH ENGLISH  
LEARNER PREFERENCE PRIMARY DEMONSTRATION DEMONSTRATION DEMONSTRATION DEMONSTRATION  
ANSWERED BY Patient patient patient patient RELATIONSHIP SELF SELF SELF SELF Abuse Screening: No flowsheet data found. Fall Risk Fall Risk Assessment, last 12 mths 11/6/2018 3/24/2014 Able to walk? No Yes Fall in past 12 months? - No  
 
 
Visit Vitals /64 (BP 1 Location: Left arm, BP Patient Position: Sitting) Pulse 78 Temp 98.7 °F (37.1 °C) (Oral) Resp 16 Ht 5' 7\" (1.702 m) SpO2 99% BMI 38.53 kg/m² Health Maintenance reviewed and discussed per provider. Yes Millicent Rater is due for Health Maintenance Due Topic Date Due  
 Hepatitis C Screening  1950  
 FOOT EXAM Q1  07/27/1960  MICROALBUMIN Q1  07/27/1960  
 EYE EXAM RETINAL OR DILATED Q1  07/27/1960  
 DTaP/Tdap/Td series (1 - Tdap) 07/27/1971  Shingrix Vaccine Age 50> (1 of 2) 07/27/2000  BREAST CANCER SCRN MAMMOGRAM  01/19/2014  GLAUCOMA SCREENING Q2Y  07/27/2015  Bone Densitometry (Dexa) Screening  07/27/2015  Pneumococcal 65+ Low/Medium Risk (1 of 2 - PCV13) 07/27/2015  MEDICARE YEARLY EXAM  03/28/2018  FOBT Q 1 YEAR AGE 50-75  06/15/2018  Influenza Age 5 to Adult  08/01/2018 Please order/place referral if appropriate. Advance Directive: 1. Do you have an advance directive in place? Patient Reply: No 
 
2. If not, would you like material regarding how to put one in place? Patient Reply: No 
 
Coordination of Care: 1. Have you been to the ER, urgent care clinic since your last visit? Hospitalized since your last visit? No 
 
2. Have you seen or consulted any other health care providers outside of the [address] [place] [address] since your last visit? Include any pap smears or colon screening.  No

## 2018-11-07 NOTE — PROGRESS NOTES
Stop daptomycin now due to elevated CPK, creatinine, LFTs. Start instead linezolid 600 mg IV q12 for the next 4 weeks. Monitor CBC weekly as ordered. Could you please contact "[employer]" to let them know of change.  Thanks

## 2018-11-09 ENCOUNTER — TELEPHONE (OUTPATIENT)
Dept: INTERNAL MEDICINE CLINIC | Age: 68
End: 2018-11-09

## 2018-11-09 NOTE — TELEPHONE ENCOUNTER
----- Message from Cristela Francis MD sent at 11/7/2018  4:49 PM EST ----- Stop daptomycin now due to elevated CPK, creatinine, LFTs. Start instead linezolid 600 mg IV q12 for the next 4 weeks. Monitor CBC weekly as ordered. Could you please contact [employer] to let them know of change. Thanks

## 2018-11-14 ENCOUNTER — OFFICE VISIT (OUTPATIENT)
Dept: CARDIOLOGY CLINIC | Age: 68
End: 2018-11-14

## 2018-11-14 ENCOUNTER — CLINICAL SUPPORT (OUTPATIENT)
Dept: CARDIOLOGY CLINIC | Age: 68
End: 2018-11-14

## 2018-11-14 VITALS
WEIGHT: 239 LBS | HEART RATE: 81 BPM | DIASTOLIC BLOOD PRESSURE: 76 MMHG | BODY MASS INDEX: 37.51 KG/M2 | HEIGHT: 67 IN | SYSTOLIC BLOOD PRESSURE: 132 MMHG

## 2018-11-14 DIAGNOSIS — I11.0 BENIGN HYPERTENSIVE HEART DISEASE WITH SYSTOLIC CHF, NYHA CLASS 2 (HCC): ICD-10-CM

## 2018-11-14 DIAGNOSIS — I42.8 NONISCHEMIC CARDIOMYOPATHY (HCC): Primary | Chronic | ICD-10-CM

## 2018-11-14 DIAGNOSIS — I44.7 LEFT BUNDLE BRANCH BLOCK (LBBB) ON ELECTROCARDIOGRAM: ICD-10-CM

## 2018-11-14 DIAGNOSIS — I50.22 CHRONIC SYSTOLIC HEART FAILURE (HCC): ICD-10-CM

## 2018-11-14 DIAGNOSIS — Z95.0 BIVENTRICULAR CARDIAC PACEMAKER IN SITU: ICD-10-CM

## 2018-11-14 DIAGNOSIS — G82.20 PARAPLEGIA (HCC): ICD-10-CM

## 2018-11-14 DIAGNOSIS — Z95.0 CARDIAC PACEMAKER IN SITU: ICD-10-CM

## 2018-11-14 DIAGNOSIS — I50.20 BENIGN HYPERTENSIVE HEART DISEASE WITH SYSTOLIC CHF, NYHA CLASS 2 (HCC): ICD-10-CM

## 2018-11-14 DIAGNOSIS — E78.5 DYSLIPIDEMIA: ICD-10-CM

## 2018-11-14 DIAGNOSIS — I42.8 NONISCHEMIC CARDIOMYOPATHY (HCC): Primary | ICD-10-CM

## 2018-11-14 RX ORDER — METFORMIN HYDROCHLORIDE 500 MG/1
500 TABLET, EXTENDED RELEASE ORAL 2 TIMES DAILY WITH MEALS
COMMUNITY
Start: 2020-05-05

## 2018-11-14 RX ORDER — LINEZOLID 2 MG/ML
600 INJECTION, SOLUTION INTRAVENOUS EVERY 12 HOURS
COMMUNITY
End: 2018-12-21

## 2018-11-15 ENCOUNTER — HOSPITAL ENCOUNTER (OUTPATIENT)
Dept: LAB | Age: 68
Discharge: HOME | End: 2018-11-15
Payer: COMMERCIAL

## 2018-11-15 DIAGNOSIS — I50.22 CHRONIC SYSTOLIC HEART FAILURE (HCC): ICD-10-CM

## 2018-11-15 DIAGNOSIS — I42.8 NONISCHEMIC CARDIOMYOPATHY (HCC): ICD-10-CM

## 2018-11-15 LAB — BNP SERPL-MCNC: 2525 PG/ML (ref 0–900)

## 2018-11-15 PROCEDURE — 83880 ASSAY OF NATRIURETIC PEPTIDE: CPT

## 2018-11-15 PROCEDURE — 36415 COLL VENOUS BLD VENIPUNCTURE: CPT

## 2018-11-16 NOTE — PROGRESS NOTES
Per your last note \" This lady is doing fairly well, but she is a little bit more short of breath with activity like shifting from her wheelchair to bed and she does feel a little bit more fatigued. She does have some rales today in her bases and I suspect that her heart failure is a little bit worse so I am going to increase her Lasix from 40 mg to 60 mg daily and check a BNP on her just to see if that is increasing as compared to the one we did in October which was mildly elevated. Will then plan to check a BMP and a BNP in a couple of weeks.

## 2018-11-17 NOTE — PROGRESS NOTES
This lady's BNP is climbing and I increased her Lasix I think from 40 mg to 60 mg daily with a repeat BMP and BNP in a couple of weeks, so let her know the results and just check to see if a repeat study has been ordered. Please also review her chart. She has a lot of allergies but I do not see an ACE or an ARB in the list and she could be a candidate for Entresto since we have labeled her as systolic heart failure and from talking to the drug reps as long as we labeled systolic heart failure we may be able to get it approved even if ejection fraction is not lower than 40%.   ES

## 2018-11-19 LAB
BACTERIA SPEC CULT: NORMAL
FUNGUS SMEAR,FNGSMR: NORMAL
SERVICE CMNT-IMP: NORMAL

## 2018-11-27 NOTE — PROGRESS NOTES
I have personally seen and evaluated the device findings. Interrogation reviewed and I agree with assessment. Cuca Acevedo

## 2018-11-30 ENCOUNTER — TELEPHONE (OUTPATIENT)
Dept: INTERNAL MEDICINE CLINIC | Age: 68
End: 2018-11-30

## 2018-11-30 NOTE — TELEPHONE ENCOUNTER
Patient had diarrhea and vomiting yesterday. Missed her evening dose. She told that patient it was ok but the patient wants to hear from us that it is ok to resume her IV antibiotics. Please call patient. [phone]

## 2018-11-30 NOTE — TELEPHONE ENCOUNTER
Sara Wells is calling from [address] [place] [address] stating the patient told her that Dr. Monserrat Ballesteros called her and told her to d/c her antibiotic. They need that in writing faxed to them at [phone]

## 2018-12-02 LAB
ACID FAST STN SPEC: NEGATIVE
MYCOBACTERIUM SPEC QL CULT: NEGATIVE
SPECIMEN PREPARATION: NORMAL
SPECIMEN SOURCE: NORMAL

## 2018-12-06 ENCOUNTER — OFFICE VISIT (OUTPATIENT)
Dept: INTERNAL MEDICINE CLINIC | Age: 68
End: 2018-12-06

## 2018-12-06 ENCOUNTER — TELEPHONE (OUTPATIENT)
Dept: INTERNAL MEDICINE CLINIC | Age: 68
End: 2018-12-06

## 2018-12-06 VITALS
BODY MASS INDEX: 37.43 KG/M2 | HEART RATE: 95 BPM | OXYGEN SATURATION: 100 % | TEMPERATURE: 98.1 F | DIASTOLIC BLOOD PRESSURE: 76 MMHG | RESPIRATION RATE: 14 BRPM | HEIGHT: 67 IN | SYSTOLIC BLOOD PRESSURE: 130 MMHG

## 2018-12-06 DIAGNOSIS — E11.40 TYPE 2 DIABETES MELLITUS WITH DIABETIC NEUROPATHY, WITH LONG-TERM CURRENT USE OF INSULIN (HCC): ICD-10-CM

## 2018-12-06 DIAGNOSIS — M86.171 OTHER ACUTE OSTEOMYELITIS OF RIGHT FOOT (HCC): ICD-10-CM

## 2018-12-06 DIAGNOSIS — M86.9 OSTEOMYELITIS OF RIGHT FOOT, UNSPECIFIED TYPE (HCC): Primary | ICD-10-CM

## 2018-12-06 DIAGNOSIS — Z79.4 TYPE 2 DIABETES MELLITUS WITH DIABETIC NEUROPATHY, WITH LONG-TERM CURRENT USE OF INSULIN (HCC): ICD-10-CM

## 2018-12-06 NOTE — Clinical Note
Hi, could you please call "[facility]" and let them know the patient can stop anbitiotic (meropenem) and have PICC line removed after tomorrow's dose. Also could you please send a copy of our note from today to Dr. Ravinder Hoang- podiatry.  Thanks

## 2018-12-06 NOTE — PROGRESS NOTES
Order faxed to [facility] to DC meropenum and picc line after dose on 12/7/18 Todays OV note sent to Dr Mora Scot

## 2018-12-06 NOTE — TELEPHONE ENCOUNTER
I faxed an order to them earlier DC abx and picc after her dose tomorrow 12/7/18 Jena Castillo was made aware and verbalized understanding

## 2018-12-06 NOTE — PROGRESS NOTES
CORINA Gross is a 76 y.o. female with relevant past medical history ofCHF, HTN, DM2 with neuropathy (HbA1c 8.7 10/2018), h/o BL LE DVT s/p IVC fileter 5/2018, history of sepsis due to GBS bacteremia secondary to psoas hematoma/abscess complicated by spinal cord infarct, causing paraplegia?(patient reports weakness not paralysis), back in 4/2018. Here for outpatient ID follow up after recent admission for R heel acute on chronic osteomyelitis. Apparently the patient had been admitted to the hospital in August,16-24, 2018 for R foot heel ulcer complicated by acute osteomyelitis, and undergo radical debridement of tissue and bone, with wound vac placement (8/20/18), neg intraoperative cultures. Discharged on oral antibiotics- vantin- cefpodoxime to take 27 more days. The patient says she could not tolerate this antibiotic due to side effects and says she might have taken it maybe for only a week more upon discharge. She said that for a few days her heels seemed to be doing well, but then she started having malaise, fevers, chills, and worsening right heel ulcer. She was admitted again on 10/17/18 until 10/23/18, and was diagnosed again with calcaneus OM, achilles tendon infection, and underwent debridement and wound vac application by podiatry on 10/19/18. Wound cultures on admisson grew ESBL- Kleb penumo, MRSA. VSE and dipheteroids. Intraoperative cultures from 10/19/18 grew ESBL Kleb pneumo, VSE, diptheroids and peptstreptococcus. The patient was initially treated with cefepime and linezolid (10/18-10/21) then briefly transitioned to amox-clav on 10/22/18, ID inpatient, Dr. Garrick Ledbetter was consulted, who recommended to treat with daptomycin and meropenem until 12/7/18, given patient microbacterial findings and antibiotic allergies including vancomycin, ampicillin, FQ.   
The patient was changed to linezolid from daptomycin on 11/7/18 due to elevated CPK on daptomycin. She completed approx 3 more weeks on linezolid but it was stopped around 11/30/18 due to severe nausea, vomiting, malaise and decreased H/H, plat, presumably all secondary to the antibiotic, she completed 6 weeks total of MRSA coverage. She is feeling much better after discontinuation of linezolid. She will complete 7 weeks of antibiotic- meropenem on 12/7/18 from procedure 10/19/18 as planned. She has her wound vac in place and states she her wound is healing. Podiatry has been considering grafting in the near future. ROS As above included in HPI. Otherwise 11 point review of systems negative including constitutional, skin, HENT, eyes, respiratory, cardiovascular, gastrointestinal, genitourinary, musculoskeletal, endocrine, hematologic, allergy, and neurologic. Past Medical History Past Medical History:  
Diagnosis Date  Acute paraplegia ([facility] [address] 4/20/2017  Benign hypertensive heart disease with systolic CHF, NYHA class 2 ([address] 9/5/2012  Biventricular implantable cardioverter-defibrillator in situ 04/28/2005 Upgraded to BiV AICD; gen change 4/2008; pocket revision 10/2009; Abdominal - done on 8/22/2012 by Dr. Mili Grissom  Cardiac cath 08/15/1996 Patent coronaries. Elev LVEDP. EF 50-55%.  Cardiac echocardiogram 06/23/2015 Ltd study. EF 45-50%. Mild, diffuse hypk. Severe apical hypk. No mass or thrombus was clearly identified, although imaging was suboptimal.    
 Cardiac nuclear imaging test 06/19/2015 Fixed distal apical, distal septal defect more likely due to RV pacing than prior infarct. No ischemia. EF 46%. RWMA c/w RV pacing. Nondiagnostic EKG on pharm stress test.    
 Cardiovascular lower extremity venous duplex 09/04/2012 Acute, non-occlusive DVT in CFV on right. No DVT on left. No superficial thrombosis bilaterally.  Cardiovascular upper extremity venous duplex 08/27/2012 DVT in axillary vein on left. Left subclavian was not visualized.  Chronic anemia 9/5/2012  Chronic systolic heart failure ([address]  Decreased calculated glomerular filtration rate (GFR) 3/30/2017 Calculated GFR equivalent to that of CKD stage 3 = 30-59 ml/min  Diabetic neuropathy associated with type 2 diabetes mellitus ([address] 6/28/2011  Difficult airway for intubation 08/22/2012  
 see anesthesia airway note  Dyslipidemia 6/28/2011  Gout  History of complete heart block 6/28/2011  History of Coumadin therapy Anticoagulation for DVT of the LUE; Discontinued on 3/30/2017  History of deep venous thrombosis 9/5/2012 Left upper extremity  History of pyelonephritis 3/30/2017  Left bundle branch block (LBBB) on electrocardiogram 6/28/2011  Nonischemic cardiomyopathy ([address] 6/28/2011  Obesity (BMI 35.0-39.9 without comorbidity) 3/13/2017  Obstructive sleep apnea on CPAP 2/7/2012  Psoas abscess, right ([address] 4/20/2017  Psoas hematoma, right, secondary to anticoagulant therapy 3/30/2017  Type 2 diabetes mellitus with diabetic neuropathy ([address] 6/28/2011 Past Surgical History:  
Procedure Laterality Date  HX CARPAL TUNNEL RELEASE  4[facility]07 [facility] [address]  HX OTHER SURGICAL  6/11/2012 AICD revision  HX PACEMAKER  4/28/2005 [employer] AICD Family History Family History Problem Relation Age of Onset  Cancer Father Leukemia Social History She  reports that  has never smoked. she has never used smokeless tobacco.  
Social History Substance and Sexual Activity Alcohol Use No  
 
 
Immunization History There is no immunization history on file for this patient. Allergies Allergies Allergen Reactions  Vancomycin Itching  Ampicillin Itching  Bactrim [Sulfamethoxazole-Trimethoprim] Unknown (comments)  Blueberry Swelling Causes throat swelling  Ciprofloxacin Itching  Codeine Other (comments) Jumpy feeling  Crestor [Rosuvastatin] Itching  Darvocet A500 [Propoxyphene N-Acetaminophen] Itching  Demerol [Meperidine] Itching  Levaquin [Levofloxacin] Itching  Lipitor [Atorvastatin] Myalgia  Magnesium Oxide Itching  
  nausea  Minocin [Minocycline] Unknown (comments)  Pcn [Penicillins] Itching  Pravachol [Pravastatin] Swelling Swelling in mouth. Not allergic per patient, takes at home  Shellfish Derived Unknown (comments)  Sulfa (Sulfonamide Antibiotics) Itching  Ultracet [Tramadol-Acetaminophen] Itching  Vicodin [Hydrocodone-Acetaminophen] Unknown (comments)  Vytorin 10-10 [Ezetimibe-Simvastatin] Myalgia  Percodan [Oxycodone Hcl-Oxycodone-Asa] Itching Medications Current Outpatient Medications Medication Sig  
 metFORMIN ER (GLUCOPHAGE XR) 500 mg tablet  ondansetron (ZOFRAN ODT) 4 mg disintegrating tablet 1 Tab by SubLINGual route every six (6) hours as needed.  meropenem 1 g IV syringe 1 g by IntraVENous route every eight (8) hours for 45 days.  baclofen (LIORESAL) 10 mg tablet Take 1 Tab by mouth three (3) times daily.  cholecalciferol, VITAMIN D3, (VITAMIN D3) 5,000 unit tab tablet Take  by mouth daily.  carvedilol (COREG) 6.25 mg tablet 6.25 mg two (2) times a day.  ferrous sulfate 325 mg (65 mg iron) tablet Take 1 Tab by mouth two (2) times daily (with meals).  Lactobacillus Acidoph & Bulgar (FLORANEX) 1 million cell tab tablet Take 1 Tab by mouth two (2) times a day.  furosemide (LASIX) 40 mg tablet 1 tab daily  Indications: Edema (Patient taking differently: 40 mg. 1 tab daily  Indications: Edema, patient taking 60mg for the past 2 weeks)  gabapentin (NEURONTIN) 300 mg capsule Take 2 Caps by mouth two (2) times a day.  Indications: NEUROPATHIC PAIN  
 insulin lispro (HUMALOG) 100 unit/mL injection See sliding scale (Patient taking differently: 4 Units Before breakfast, lunch, and dinner. See sliding scale)  folic acid (FOLVITE) 1 mg tablet Take 1 Tab by mouth daily.  insulin glargine (LANTUS) 100 unit/mL injection 5 Units by SubCUTAneous route nightly. Indications: type 2 diabetes mellitus (Patient taking differently: 20 Units by SubCUTAneous route nightly.)  famotidine (PEPCID) 20 mg tablet Take 1 Tab by mouth nightly. (Patient taking differently: Take 20 mg by mouth daily as needed.)  linezolid (ZYVOX) 600 mg/300 mL solp 600 mg by IntraVENous route every twelve (12) hours. No current facility-administered medications for this visit. Visit Vitals /76 (BP 1 Location: Left arm, BP Patient Position: Sitting) Pulse 95 Temp 98.1 °F (36.7 °C) (Axillary) Resp 14 Ht 5' 7\" (1.702 m) LMP  (LMP Unknown) SpO2 100% BMI 37.43 kg/m² Body mass index is 37.43 kg/m². Physical Exam  
Constitutional: She is oriented to person, place, and time and well-developed, well-nourished, and in no distress. No distress. HENT:  
Head: Normocephalic and atraumatic. Eyes: Pupils are equal, round, and reactive to light. Cardiovascular: Normal rate and regular rhythm. Pulmonary/Chest: Effort normal and breath sounds normal.  
Abdominal: Soft. Musculoskeletal: She exhibits edema (R heel surrounding edema, no significant warmth or erythema). She exhibits no tenderness. Right heel dressed with clean dressing, wound vac in place Neurological: She is alert and oriented to person, place, and time. Skin: She is not diaphoretic. Nursing note and vitals reviewed. REVIEW OF DATA Labs Hospital Outpatient Visit on 11/15/2018 Component Date Value Ref Range Status  NT pro-BNP 11/15/2018 2,525* 0 - 900 PG/ML Final  
 
 
 
CT Results (most recent): 
Results from Hospital Encounter encounter on 09/04/18 CTA CHEST W OR W WO CONT Narrative EXAMINATION: CTA chest pulmonary INDICATION: Acute chest pain, shortness of breath COMPARISON: None TECHNIQUE: CT angiography of the pulmonary arteries performed following 78 cc IV Isovue 370 with 3-D MIP multiplanar reformations. All CT scans at this facility 
are performed using dose optimization technique as appropriate to a performed 
exam, to include automated exposure control, adjustment of the mA and/or kV 
according to patient size (including appropriate matching first site specific 
examinations), or use of iterative reconstruction technique. FINDINGS: 
 
Evaluation limited by streak artifact from arms at side. Motion artifact further 
limits evaluation. Cardiovascular: No pulmonary arterial filling defects identified to suggest 
pulmonary embolus. Smaller segmental and subsegmental arteries not as well 
evaluated. Thoracic aorta is normal in course and caliber. Heart size within 
normal limits. Post median sternotomy. Epicardial leads noted from inferior 
approach. Mediastinum: Enlarged heterogeneous thyroid. No mediastinal adenopathy by size 
criteria. Small hiatal hernia. Pleura: No effusions or pneumothorax. Lungs/airways: Motion limits evaluation. Bandlike consolidation in the right 
posterior lower lobe, likely atelectasis/scar, and linear atelectasis/scar in 
the left lower lobe. Upper abdomen: Status post cholecystectomy. Spleen is slightly prominent in 
size. Small hiatal hernia. Probable bilateral renal cysts and right renal 
atrophy and scarring, incompletely evaluated. Miscellaneous: Superficial soft tissues unremarkable. Bones: No acute osseous findings. Impression IMPRESSION: 
 
1. No pulmonary embolus identified. Smaller segmental and subsegmental arteries 
not as well evaluated. See limitations above. 2. Bandlike consolidation in the right lower lobe likely atelectasis/scar. Additional suspected atelectasis or scar in the left lower lobe. 2. Enlarged heterogeneous thyroid. -Small hiatal hernia. -Status post cholecystectomy and mildly prominent spleen. 
-Bilateral probable renal cysts and right renal atrophy/scarring, incompletely 
evaluated. XR Results (most recent): 
Results from Hospital Encounter encounter on 10/26/18 XR CHEST PORT Narrative Examination: Portable AP chest  
 
History: PICC position Comparison: September 4, 2018 Findings: Right arm PICC with the tip in the central right axillary vein region. Lungs are grossly clear. Heart is enlarged. Epicardial leads are seen. Tortuous 
thoracic aorta. Impression Impression: 1. Right arm PICC with the tip in the central right axillary vein region. CT All Micro Results None DIAGNOSIS AND PLAN Patient Active Problem List  
Diagnosis Code  Nonischemic cardiomyopathy (HCC) I42.8  History of complete heart block Z86.79  Biventricular implantable cardioverter-defibrillator in situ Z95.810  Left bundle branch block (LBBB) on electrocardiogram I44.7  Type 2 diabetes mellitus with diabetic neuropathy (HCC) E11.40  Dyslipidemia E78.5  Diabetic neuropathy associated with type 2 diabetes mellitus (HCC) E11.40  Obstructive sleep apnea on CPAP G47.33, Z99.89  
 AICD generator infection ([facility] [address] T82. 7XXA  Difficult airway for intubation T88. 4XXA  Benign hypertensive heart disease with systolic CHF, NYHA class 2 (HCC) I11.0, I50.20  Decreased calculated glomerular filtration rate (GFR) R94.4  Chronic anemia D64.9  
 History of deep venous thrombosis Z86.718  Anticoagulated on Coumadin Z51.81, Z79.01  
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  
 Obesity (BMI 35.0-39.9 without comorbidity) E66.9  
 History of pyelonephritis Z87.448  Iliopsoas muscle hematoma S70.10XA  Psoas hematoma, right, secondary to anticoagulant therapy S30. Katrina Lank  Impaired mobility and ADLs Z74.09  
  History of Coumadin therapy Z92.29  
 Gout M10.9  Acute paraplegia (HCC) G82.20  Sepsis ([address] A41.9  Psoas abscess, right ([address] K68.12  
 Krueger catheter in place on admission Z96.0  
 Urinary tract infection due to Enterococcus N39.0, B95.2  Group B streptococcal infection A49.1  Hypervolemia E87.70  Anemia D64.9  Biventricular cardiac pacemaker in situ Z95.0  Neurogenic bladder N31.9  Type 2 diabetes with nephropathy ([facility]) E11.21  
 Obesity (BMI 30.0-34. 9) E66.9  Severe obesity (BMI 35.0-39. 9) with comorbidity ([address] E66.01  
 Osteomyelitis ([address] M86.9  Foot osteomyelitis, right ([address] M86.9  Heel ulcer ([address] L97.409 1. Osteomyelitis of right foot, unspecified type ([address] 2. Type 2 diabetes mellitus with diabetic neuropathy, with long-term current use of insulin ([address] R heel acute on chronic osteomyelitis. Admitted from 10/17/18 until 10/23/18, for R foot calcaneus OM, achilles tendon infection, s/p debridement and wound vac application by podiatry on 10/19/18. Wound cultures on 10/17/18 grew ESBL- Kleb penumo, MRSA. VSE and dipheteroids. Intraoperative cultures from 10/19/18 grew ESBL Kleb pneumo, VSE, diptheroids and peptstreptococcus. The patient was initially treated with cefepime and linezolid (10/18-10/21) then briefly transitioned to amox-clav on 10/22/18, ID inpatient, Dr. Bob Bryson was consulted, who recommended to treat with daptomycin and meropenem until 12/7/18, given patient microbacterial findings and antibiotic allergies including vancomycin, ampicillin, FQ. The patient reports mild itching with meropenem relieved by atarax and would like a refill. Denies any respiratory or oral symptoms. Labs from hospital 
CRP 0.2 (12/3/18) from 2.4 (10/19) WBC 5.9, H/H 8.7/26, Plat 105. The patient was changed to linezolid from daptomycin on 11/7/18 due to elevated CPK on daptomycin.  She completed approx 3 more weeks on linezolid but it was stopped around 11/30/18 due to severe nausea, vomiting, malaise and decreased H/H, plat, presumably all secondary to the antibiotic, she completed 6 weeks total of MRSA coverage. She is feeling much better after discontinuation of linezolid. She will complete 7 weeks of antibiotic- meropenem on 12/7/18 from procedure 10/19/18 as planned. - May stop antibiotics tomorrow 12/7/18 and remove PICC line after antibiotics. - C/w wound vac/wound care/podiatry follow up closely - Follow up in 4 weeks or sooner depending on clinical progress/changes/concerns  
- Diabetes strict control discussed with patient, adherence to medications in addition to diet recommended emphasized. Follow-up Disposition: Not on File Joanne Barnard MD

## 2018-12-13 ENCOUNTER — HOSPITAL ENCOUNTER (OUTPATIENT)
Dept: LAB | Age: 68
Discharge: HOME | End: 2018-12-13
Payer: COMMERCIAL

## 2018-12-13 LAB
ANION GAP SERPL CALC-SCNC: 7 MMOL/L (ref 3–18)
BNP SERPL-MCNC: 2896 PG/ML (ref 0–900)
BUN SERPL-MCNC: 24 MG/DL (ref 7–18)
BUN/CREAT SERPL: 19 (ref 12–20)
CALCIUM SERPL-MCNC: 9.4 MG/DL (ref 8.5–10.1)
CHLORIDE SERPL-SCNC: 105 MMOL/L (ref 100–108)
CO2 SERPL-SCNC: 29 MMOL/L (ref 21–32)
CREAT SERPL-MCNC: 1.24 MG/DL (ref 0.6–1.3)
GLUCOSE SERPL-MCNC: 79 MG/DL (ref 74–99)
POTASSIUM SERPL-SCNC: 3.5 MMOL/L (ref 3.5–5.5)
SODIUM SERPL-SCNC: 141 MMOL/L (ref 136–145)

## 2018-12-13 PROCEDURE — 83880 ASSAY OF NATRIURETIC PEPTIDE: CPT

## 2018-12-13 PROCEDURE — 80048 BASIC METABOLIC PNL TOTAL CA: CPT

## 2018-12-13 PROCEDURE — 36415 COLL VENOUS BLD VENIPUNCTURE: CPT

## 2018-12-17 ENCOUNTER — TELEPHONE (OUTPATIENT)
Dept: CARDIOLOGY CLINIC | Age: 68
End: 2018-12-17

## 2018-12-17 RX ORDER — CARVEDILOL 6.25 MG/1
6.25 TABLET ORAL 2 TIMES DAILY
Qty: 60 TAB | Refills: 6 | Status: SHIPPED | OUTPATIENT
Start: 2018-12-17 | End: 2019-03-01 | Stop reason: SDUPTHER

## 2018-12-17 RX ORDER — FUROSEMIDE 40 MG/1
TABLET ORAL
Qty: 30 TAB | Refills: 6 | Status: SHIPPED | OUTPATIENT
Start: 2018-12-17 | End: 2019-03-01 | Stop reason: SDUPTHER

## 2018-12-17 NOTE — TELEPHONE ENCOUNTER
Patient called office with few questions. She needed a refill on two medications which was processed. She wanted to confirm her upcomming device check this week and she wanted to see if Dr. Nancy Estes had reviewed her labs she had done on 12/13/18. Labs printed and shown to Dr. Nancy Estes patient notified she will receive a call back if any new orders are needed.

## 2018-12-19 ENCOUNTER — OFFICE VISIT (OUTPATIENT)
Dept: CARDIOLOGY CLINIC | Age: 68
End: 2018-12-19

## 2018-12-19 DIAGNOSIS — Z95.0 BIVENTRICULAR CARDIAC PACEMAKER IN SITU: ICD-10-CM

## 2018-12-19 DIAGNOSIS — Z95.810 BIVENTRICULAR IMPLANTABLE CARDIOVERTER-DEFIBRILLATOR IN SITU: ICD-10-CM

## 2018-12-19 DIAGNOSIS — I42.8 NONISCHEMIC CARDIOMYOPATHY (HCC): Primary | Chronic | ICD-10-CM

## 2018-12-21 ENCOUNTER — APPOINTMENT (OUTPATIENT)
Dept: GENERAL RADIOLOGY | Age: 68
End: 2018-12-21
Attending: EMERGENCY MEDICINE
Payer: COMMERCIAL

## 2018-12-21 ENCOUNTER — TELEPHONE (OUTPATIENT)
Dept: INTERNAL MEDICINE CLINIC | Age: 68
End: 2018-12-21

## 2018-12-21 ENCOUNTER — HOSPITAL ENCOUNTER (EMERGENCY)
Age: 68
Discharge: HOME | End: 2018-12-21
Attending: EMERGENCY MEDICINE
Payer: COMMERCIAL

## 2018-12-21 VITALS
OXYGEN SATURATION: 100 % | BODY MASS INDEX: 36.37 KG/M2 | RESPIRATION RATE: 16 BRPM | HEIGHT: 68 IN | DIASTOLIC BLOOD PRESSURE: 72 MMHG | TEMPERATURE: 99.5 F | HEART RATE: 95 BPM | SYSTOLIC BLOOD PRESSURE: 145 MMHG | WEIGHT: 240 LBS

## 2018-12-21 DIAGNOSIS — N39.0 URINARY TRACT INFECTION WITHOUT HEMATURIA, SITE UNSPECIFIED: Primary | ICD-10-CM

## 2018-12-21 DIAGNOSIS — Z51.89 ENCOUNTER FOR WOUND RE-CHECK: ICD-10-CM

## 2018-12-21 LAB
ALBUMIN SERPL-MCNC: 3.3 G/DL (ref 3.4–5)
ALBUMIN/GLOB SERPL: 0.8 {RATIO} (ref 0.8–1.7)
ALP SERPL-CCNC: 130 U/L (ref 45–117)
ALT SERPL-CCNC: 38 U/L (ref 13–56)
ANION GAP SERPL CALC-SCNC: 10 MMOL/L (ref 3–18)
APPEARANCE UR: CLEAR
AST SERPL-CCNC: 31 U/L (ref 15–37)
BACTERIA URNS QL MICRO: ABNORMAL /HPF
BASOPHILS # BLD: 0 K/UL (ref 0–0.1)
BASOPHILS NFR BLD: 0 % (ref 0–2)
BILIRUB SERPL-MCNC: 0.9 MG/DL (ref 0.2–1)
BILIRUB UR QL: NEGATIVE
BUN SERPL-MCNC: 25 MG/DL (ref 7–18)
BUN/CREAT SERPL: 16 (ref 12–20)
CALCIUM SERPL-MCNC: 9.7 MG/DL (ref 8.5–10.1)
CHLORIDE SERPL-SCNC: 103 MMOL/L (ref 100–108)
CO2 SERPL-SCNC: 28 MMOL/L (ref 21–32)
COLOR UR: YELLOW
CREAT SERPL-MCNC: 1.54 MG/DL (ref 0.6–1.3)
DIFFERENTIAL METHOD BLD: ABNORMAL
EOSINOPHIL # BLD: 0.3 K/UL (ref 0–0.4)
EOSINOPHIL NFR BLD: 4 % (ref 0–5)
EPITH CASTS URNS QL MICRO: ABNORMAL /LPF (ref 0–5)
ERYTHROCYTE [DISTWIDTH] IN BLOOD BY AUTOMATED COUNT: 18.5 % (ref 11.6–14.5)
GLOBULIN SER CALC-MCNC: 4.4 G/DL (ref 2–4)
GLUCOSE SERPL-MCNC: 110 MG/DL (ref 74–99)
GLUCOSE UR STRIP.AUTO-MCNC: NEGATIVE MG/DL
HCT VFR BLD AUTO: 30 % (ref 35–45)
HGB BLD-MCNC: 9.8 G/DL (ref 12–16)
HGB UR QL STRIP: ABNORMAL
KETONES UR QL STRIP.AUTO: NEGATIVE MG/DL
LACTATE BLD-SCNC: 0.87 MMOL/L (ref 0.4–2)
LEUKOCYTE ESTERASE UR QL STRIP.AUTO: ABNORMAL
LYMPHOCYTES # BLD: 1.6 K/UL (ref 0.9–3.6)
LYMPHOCYTES NFR BLD: 20 % (ref 21–52)
MCH RBC QN AUTO: 27.8 PG (ref 24–34)
MCHC RBC AUTO-ENTMCNC: 32.7 G/DL (ref 31–37)
MCV RBC AUTO: 85 FL (ref 74–97)
MONOCYTES # BLD: 0.8 K/UL (ref 0.05–1.2)
MONOCYTES NFR BLD: 10 % (ref 3–10)
NEUTS SEG # BLD: 5.3 K/UL (ref 1.8–8)
NEUTS SEG NFR BLD: 66 % (ref 40–73)
NITRITE UR QL STRIP.AUTO: POSITIVE
PH UR STRIP: 6.5 [PH] (ref 5–8)
PLATELET # BLD AUTO: 262 K/UL (ref 135–420)
PMV BLD AUTO: 9.7 FL (ref 9.2–11.8)
POTASSIUM SERPL-SCNC: 3.4 MMOL/L (ref 3.5–5.5)
PROT SERPL-MCNC: 7.7 G/DL (ref 6.4–8.2)
PROT UR STRIP-MCNC: 30 MG/DL
RBC # BLD AUTO: 3.53 M/UL (ref 4.2–5.3)
RBC #/AREA URNS HPF: ABNORMAL /HPF (ref 0–5)
SODIUM SERPL-SCNC: 141 MMOL/L (ref 136–145)
SP GR UR REFRACTOMETRY: 1.01 (ref 1–1.03)
UROBILINOGEN UR QL STRIP.AUTO: 1 EU/DL (ref 0.2–1)
WBC # BLD AUTO: 8.1 K/UL (ref 4.6–13.2)
WBC URNS QL MICRO: ABNORMAL /HPF (ref 0–4)

## 2018-12-21 PROCEDURE — 73610 X-RAY EXAM OF ANKLE: CPT

## 2018-12-21 PROCEDURE — 87040 BLOOD CULTURE FOR BACTERIA: CPT

## 2018-12-21 PROCEDURE — 80053 COMPREHEN METABOLIC PANEL: CPT

## 2018-12-21 PROCEDURE — 83605 ASSAY OF LACTIC ACID: CPT

## 2018-12-21 PROCEDURE — 71046 X-RAY EXAM CHEST 2 VIEWS: CPT

## 2018-12-21 PROCEDURE — 99283 EMERGENCY DEPT VISIT LOW MDM: CPT

## 2018-12-21 PROCEDURE — 81001 URINALYSIS AUTO W/SCOPE: CPT

## 2018-12-21 PROCEDURE — 85025 COMPLETE CBC W/AUTO DIFF WBC: CPT

## 2018-12-21 NOTE — TELEPHONE ENCOUNTER
Spoke with [facility], she said they were with the pt earlier today and noted the wound smelled foul, smell was noted by pt on Wednesday  No fever, no other symptoms. She stopped abx on 12/7  Per Dr Chinmay Duke, the cx on 10/19/18 grew ESBL Kleb pneumo, VSE, diptheroids and peptstreptococcus, which some are resistant to oral abx. Since dr Chinmay Duke will be out of the office for 5 days, she recommends pt go to the ER for possible IV abx if needed. Called [facility], gave her message for pt to go to the ER for evaluation.  She will call the pt now to let her know

## 2018-12-21 NOTE — TELEPHONE ENCOUNTER
Rogelio Campos from [employer] called- PT has a Very foul odor from her  right heel wound- it was bleeding- does she need any labs or cultures for possible antibiotic therapy?  Please advise- office # [phone]  Cell# [phone]

## 2018-12-21 NOTE — TELEPHONE ENCOUNTER
As discussed, given patient history, needs to go into the ED for evaluation. H/o ESBL Klebsiella foot infection, will not respond to oral antibiotics. Needs assessment ASAP for possible debridement and IV antibiotic therapy.

## 2018-12-22 NOTE — DISCHARGE INSTRUCTIONS
Urinary Tract Infection in Women: Care Instructions  Your Care Instructions    A urinary tract infection, or UTI, is a general term for an infection anywhere between the kidneys and the urethra (where urine comes out). Most UTIs are bladder infections. They often cause pain or burning when you urinate. UTIs are caused by bacteria and can be cured with antibiotics. Be sure to complete your treatment so that the infection goes away. Follow-up care is a key part of your treatment and safety. Be sure to make and go to all appointments, and call your doctor if you are having problems. It's also a good idea to know your test results and keep a list of the medicines you take. How can you care for yourself at home? · Take your antibiotics as directed. Do not stop taking them just because you feel better. You need to take the full course of antibiotics. · Drink extra water and other fluids for the next day or two. This may help wash out the bacteria that are causing the infection. (If you have kidney, heart, or liver disease and have to limit fluids, talk with your doctor before you increase your fluid intake.)  · Avoid drinks that are carbonated or have caffeine. They can irritate the bladder. · Urinate often. Try to empty your bladder each time. · To relieve pain, take a hot bath or lay a heating pad set on low over your lower belly or genital area. Never go to sleep with a heating pad in place. To prevent UTIs  · Drink plenty of water each day. This helps you urinate often, which clears bacteria from your system. (If you have kidney, heart, or liver disease and have to limit fluids, talk with your doctor before you increase your fluid intake.)  · Urinate when you need to. · Urinate right after you have sex. · Change sanitary pads often. · Avoid douches, bubble baths, feminine hygiene sprays, and other feminine hygiene products that have deodorants.   · After going to the bathroom, wipe from front to back.  When should you call for help? Call your doctor now or seek immediate medical care if:    · Symptoms such as fever, chills, nausea, or vomiting get worse or appear for the first time.     · You have new pain in your back just below your rib cage. This is called flank pain.     · There is new blood or pus in your urine.     · You have any problems with your antibiotic medicine.    Watch closely for changes in your health, and be sure to contact your doctor if:    · You are not getting better after taking an antibiotic for 2 days.     · Your symptoms go away but then come back. Where can you learn more? Go to http://aren-mirella.info/. Enter C601 in the search box to learn more about \"Urinary Tract Infection in Women: Care Instructions. \"  Current as of: March 21, 2018  Content Version: 11.8  © 3629-5582 [employer]. Care instructions adapted under license by [facility] (which disclaims liability or warranty for this information). If you have questions about a medical condition or this instruction, always ask your healthcare professional. [address] any warranty or liability for your use of this information.

## 2018-12-22 NOTE — ED NOTES
Wiliam Daniels is a 76 y.o. female that was discharged in stable. Pt was accompanied by spouse. Pt is not driving. The patients diagnosis, condition and treatment were explained to  patient and aftercare instructions were given. The patient verbalized understanding. Patient armband removed and shredded.

## 2018-12-22 NOTE — ED PROVIDER NOTES
EMERGENCY DEPARTMENT HISTORY AND PHYSICAL EXAM    7:26 PM      Date: 12/21/2018  Patient Name: Bertin Art    History of Presenting Illness     Chief Complaint   Patient presents with    Wound Check         History Provided By: Patient    Chief Complaint: Possible infected wound  Duration:  1 Day  Timing:  Constant  Location: Right foot  Quality: \"Odorous and burning\"  Severity: 7 out of 10  Modifying Factors: Patient finished IV antibotics 1 week ago  Associated Symptoms: Chills. Patient denies any other associated symptoms or complaints. Additional History (Context): Bertin Art is a 76 y.o. female with a past medical history of gout, psoas abscess, and obesity who presents with c/o possible infected wound onset 1 day ago. Patient describes the wound as constant and rated 7 out of 10. She said that the wound initially was diagnosed a bone infection 2 years ago but since has improved with treatment. She was receiving IV antibiotics which she finished last week. She also states that the wound is cleaned and re-bandaged every week by a specialized caretaker. She notes that she also has had two surgeries on the wound area in the past. Patient reports that today, she noticed an odor and a burning sensation. The caretaker also came today and referred her to the ER for evaluation. Patent states that she has associated symptoms or fever and chills. She admits that she has diabetes and that she takes Metformin and insulin. Patient denies any other associated symptoms or complaints. PCP: Ritu Todd MD    Current Outpatient Medications   Medication Sig Dispense Refill    carvedilol (COREG) 6.25 mg tablet Take 1 Tab by mouth two (2) times a day. 60 Tab 6    furosemide (LASIX) 40 mg tablet 1 tab daily 30 Tab 6    metFORMIN ER (GLUCOPHAGE XR) 500 mg tablet       ondansetron (ZOFRAN ODT) 4 mg disintegrating tablet 1 Tab by SubLINGual route every six (6) hours as needed.  10 Tab 0    baclofen (LIORESAL) 10 mg tablet Take 1 Tab by mouth three (3) times daily. 1 Tab 0    cholecalciferol, VITAMIN D3, (VITAMIN D3) 5,000 unit tab tablet Take  by mouth daily.  gabapentin (NEURONTIN) 300 mg capsule Take 2 Caps by mouth two (2) times a day. Indications: NEUROPATHIC PAIN 100 Cap 0    insulin lispro (HUMALOG) 100 unit/mL injection See sliding scale (Patient taking differently: 4 Units Before breakfast, lunch, and dinner. See sliding scale) 1 Vial 0    folic acid (FOLVITE) 1 mg tablet Take 1 Tab by mouth daily. 30 Tab 0    famotidine (PEPCID) 20 mg tablet Take 1 Tab by mouth nightly. (Patient taking differently: Take 20 mg by mouth daily as needed.) 30 Tab 0    insulin glargine (LANTUS) 100 unit/mL injection 5 Units by SubCUTAneous route nightly. Indications: type 2 diabetes mellitus (Patient taking differently: 20 Units by SubCUTAneous route nightly.) 1 Vial 0       Past History     Past Medical History:  Past Medical History:   Diagnosis Date    Acute paraplegia ([facility] [address] 4/20/2017    Benign hypertensive heart disease with systolic CHF, NYHA class 2 ([facility] [address] 9/5/2012    Biventricular implantable cardioverter-defibrillator in situ 04/28/2005    Upgraded to BiV AICD; gen change 4/2008; pocket revision 10/2009; Abdominal - done on 8/22/2012 by Dr. Laura Goyal Cardiac cath 08/15/1996    Patent coronaries. Elev LVEDP. EF 50-55%.  Cardiac echocardiogram 06/23/2015    Ltd study. EF 45-50%. Mild, diffuse hypk. Severe apical hypk. No mass or thrombus was clearly identified, although imaging was suboptimal.      Cardiac nuclear imaging test 06/19/2015    Fixed distal apical, distal septal defect more likely due to RV pacing than prior infarct. No ischemia. EF 46%. RWMA c/w RV pacing. Nondiagnostic EKG on pharm stress test.      Cardiovascular lower extremity venous duplex 09/04/2012    Acute, non-occlusive DVT in CFV on right. No DVT on left. No superficial thrombosis bilaterally.     Cardiovascular upper extremity venous duplex 08/27/2012    DVT in axillary vein on left. Left subclavian was not visualized.  Chronic anemia 9/5/2012    Chronic systolic heart failure (HCC)     Decreased calculated glomerular filtration rate (GFR) 3/30/2017    Calculated GFR equivalent to that of CKD stage 3 = 30-59 ml/min    Diabetic neuropathy associated with type 2 diabetes mellitus ([address] 6/28/2011    Difficult airway for intubation 08/22/2012    see anesthesia airway note    Dyslipidemia 6/28/2011    Gout     History of complete heart block 6/28/2011    History of Coumadin therapy     Anticoagulation for DVT of the LUE; Discontinued on 3/30/2017    History of deep venous thrombosis 9/5/2012    Left upper extremity    History of pyelonephritis 3/30/2017    Left bundle branch block (LBBB) on electrocardiogram 6/28/2011    Nonischemic cardiomyopathy ([address] 6/28/2011    Obesity (BMI 35.0-39.9 without comorbidity) 3/13/2017    Obstructive sleep apnea on CPAP 2/7/2012    Psoas abscess, right ([address] 4/20/2017    Psoas hematoma, right, secondary to anticoagulant therapy 3/30/2017    Type 2 diabetes mellitus with diabetic neuropathy ([address] 6/28/2011       Past Surgical History:  Past Surgical History:   Procedure Laterality Date    HX CARPAL TUNNEL RELEASE  4/07    right     HX CHOLECYSTECTOMY  1994    HX HYSTERECTOMY  1973    HX OTHER SURGICAL  6/11/2012    AICD revision    HX PACEMAKER  4/28/2005    [employer] AICD       Family History:  Family History   Problem Relation Age of Onset    Cancer Father         Leukemia       Social History:  Social History     Tobacco Use    Smoking status: Never Smoker    Smokeless tobacco: Never Used   Substance Use Topics    Alcohol use: No    Drug use: No       Allergies:   Allergies   Allergen Reactions    Vancomycin Itching    Ampicillin Itching    Bactrim [Sulfamethoxazole-Trimethoprim] Unknown (comments)    Blueberry Swelling     Causes throat swelling    Ciprofloxacin Itching    Codeine Other (comments)     Jumpy feeling    Crestor [Rosuvastatin] Itching    Darvocet A500 [Propoxyphene N-Acetaminophen] Itching    Demerol [Meperidine] Itching    Levaquin [Levofloxacin] Itching    Lipitor [Atorvastatin] Myalgia    Magnesium Oxide Itching     nausea    Minocin [Minocycline] Unknown (comments)    Pcn [Penicillins] Itching    Pravachol [Pravastatin] Swelling     Swelling in mouth. Not allergic per patient, takes at home    Shellfish Derived Unknown (comments)    Sulfa (Sulfonamide Antibiotics) Itching    Ultracet [Tramadol-Acetaminophen] Itching    Vicodin [Hydrocodone-Acetaminophen] Unknown (comments)    Vytorin 10-10 [Ezetimibe-Simvastatin] Myalgia    Percodan [Oxycodone Hcl-Oxycodone-Asa] Itching         Review of Systems       Review of Systems   Constitutional: Positive for chills. Negative for activity change and fatigue. HENT: Negative for congestion and rhinorrhea. Eyes: Negative for visual disturbance. Respiratory: Negative for shortness of breath. Cardiovascular: Negative for chest pain and palpitations. Gastrointestinal: Negative for abdominal pain, diarrhea, nausea and vomiting. Genitourinary: Negative for dysuria and hematuria. Musculoskeletal: Negative for back pain. Skin: Positive for wound (right foot). Negative for rash. Neurological: Negative for dizziness, weakness and light-headedness. All other systems reviewed and are negative. Physical Exam     Visit Vitals  /82 (BP 1 Location: Left arm, BP Patient Position: At rest)   Pulse 98   Temp 99.5 °F (37.5 °C)   Resp 20   Ht 5' 7.5\" (1.715 m)   Wt 108.9 kg (240 lb)   LMP  (LMP Unknown)   SpO2 100%   BMI 37.03 kg/m²         Physical Exam   Constitutional: She is oriented to person, place, and time. She appears well-developed and well-nourished. No distress. Obese. On a mobile chair. HENT:   Head: Normocephalic.    Right Ear: External ear normal.   Left Ear: External ear normal.   Mouth/Throat: No oropharyngeal exudate. Eyes: Conjunctivae and EOM are normal. Pupils are equal, round, and reactive to light. Right eye exhibits no discharge. Left eye exhibits no discharge. No scleral icterus. Neck: Normal range of motion. Neck supple. No JVD present. No tracheal deviation present. No thyromegaly present. Cardiovascular: Normal rate, regular rhythm, normal heart sounds and intact distal pulses. Exam reveals no gallop and no friction rub. No murmur heard. Lower extremity pulses intact. Pulmonary/Chest: Effort normal and breath sounds normal. No stridor. No respiratory distress. She has no wheezes. She has no rales. She exhibits no tenderness. Abdominal: Soft. Bowel sounds are normal. She exhibits no distension and no mass. There is no tenderness. There is no rebound and no guarding. Musculoskeletal: Normal range of motion. She exhibits no tenderness. Right foot with edema. No TTP on right foot. Lymphadenopathy:     She has no cervical adenopathy. Neurological: She is alert and oriented to person, place, and time. She displays normal reflexes. No cranial nerve deficit. She exhibits normal muscle tone. Coordination normal.   Sensation intact. Skin: Skin is warm and dry. No rash noted. She is not diaphoretic. No erythema. No pallor. Right foot wound vac in place. Nursing note and vitals reviewed.         Diagnostic Study Results     Labs -  Recent Results (from the past 12 hour(s))   CBC WITH AUTOMATED DIFF    Collection Time: 12/21/18  8:25 PM   Result Value Ref Range    WBC 8.1 4.6 - 13.2 K/uL    RBC 3.53 (L) 4.20 - 5.30 M/uL    HGB 9.8 (L) 12.0 - 16.0 g/dL    HCT 30.0 (L) 35.0 - 45.0 %    MCV 85.0 74.0 - 97.0 FL    MCH 27.8 24.0 - 34.0 PG    MCHC 32.7 31.0 - 37.0 g/dL    RDW 18.5 (H) 11.6 - 14.5 %    PLATELET 034 188 - 134 K/uL    MPV 9.7 9.2 - 11.8 FL    NEUTROPHILS 66 40 - 73 %    LYMPHOCYTES 20 (L) 21 - 52 %    MONOCYTES 10 3 - 10 %    EOSINOPHILS 4 0 - 5 %    BASOPHILS 0 0 - 2 %    ABS. NEUTROPHILS 5.3 1.8 - 8.0 K/UL    ABS. LYMPHOCYTES 1.6 0.9 - 3.6 K/UL    ABS. MONOCYTES 0.8 0.05 - 1.2 K/UL    ABS. EOSINOPHILS 0.3 0.0 - 0.4 K/UL    ABS. BASOPHILS 0.0 0.0 - 0.1 K/UL    DF AUTOMATED     METABOLIC PANEL, COMPREHENSIVE    Collection Time: 12/21/18  8:25 PM   Result Value Ref Range    Sodium 141 136 - 145 mmol/L    Potassium 3.4 (L) 3.5 - 5.5 mmol/L    Chloride 103 100 - 108 mmol/L    CO2 28 21 - 32 mmol/L    Anion gap 10 3.0 - 18 mmol/L    Glucose 110 (H) 74 - 99 mg/dL    BUN 25 (H) 7.0 - 18 MG/DL    Creatinine 1.54 (H) 0.6 - 1.3 MG/DL    BUN/Creatinine ratio 16 12 - 20      GFR est AA 41 (L) >60 ml/min/1.73m2    GFR est non-AA 34 (L) >60 ml/min/1.73m2    Calcium 9.7 8.5 - 10.1 MG/DL    Bilirubin, total 0.9 0.2 - 1.0 MG/DL    ALT (SGPT) 38 13 - 56 U/L    AST (SGOT) 31 15 - 37 U/L    Alk. phosphatase 130 (H) 45 - 117 U/L    Protein, total 7.7 6.4 - 8.2 g/dL    Albumin 3.3 (L) 3.4 - 5.0 g/dL    Globulin 4.4 (H) 2.0 - 4.0 g/dL    A-G Ratio 0.8 0.8 - 1.7     URINALYSIS W/ RFLX MICROSCOPIC    Collection Time: 12/21/18  8:30 PM   Result Value Ref Range    Color YELLOW      Appearance CLEAR      Specific gravity 1.011 1.005 - 1.030      pH (UA) 6.5 5.0 - 8.0      Protein 30 (A) NEG mg/dL    Glucose NEGATIVE  NEG mg/dL    Ketone NEGATIVE  NEG mg/dL    Bilirubin NEGATIVE  NEG      Blood TRACE (A) NEG      Urobilinogen 1.0 0.2 - 1.0 EU/dL    Nitrites POSITIVE (A) NEG      Leukocyte Esterase LARGE (A) NEG     URINE MICROSCOPIC ONLY    Collection Time: 12/21/18  8:30 PM   Result Value Ref Range    WBC 21 to 35 0 - 4 /hpf    RBC 0 to 3 0 - 5 /hpf    Epithelial cells 1+ 0 - 5 /lpf    Bacteria 4+ (A) NEG /hpf   POC LACTIC ACID    Collection Time: 12/21/18  8:32 PM   Result Value Ref Range    Lactic Acid (POC) 0.87 0.40 - 2.00 mmol/L       Radiologic Studies -   XR ANKLE RT MIN 3 V   Final Result   IMPRESSION:   1.  Persistent sclerotic appearance of the calcaneus and interval loss of cortical integrity throughout much of the posterior aspect of the calcaneus. These findings are concerning for ongoing osteomyelitis. 2. Otherwise, no convincing evidence of acute fracture. 3. Soft tissue swelling as described. 4. Chronic/degenerative findings are as described. XR CHEST PA LAT    (Results Pending)     Xr Ankle Rt Min 3 V    Result Date: 12/21/2018  RIGHT ANKLE RADIOGRAPH-3 VIEWS INDICATION: Right ankle pain. Possible infected wound to right foot. COMPARISON: Right foot x-ray 10/17/2018. FINDINGS: Diffuse osteopenia. Persistent relative sclerosis of the calcaneus with interval loss of integrity throughout much of the posterior calcaneal cortex since prior exam of 10/17/2018. Overlying soft tissue at the posterior aspect of the calcaneus appears relatively thinned. Presumed wound vac present at the medial aspect of the distal right lower leg. Degenerative changes noted at the ankle and moderate degenerative changes noted at the midfoot. Large plantar calcaneal spur. No convincing evidence for acute fracture. Additional diffuse soft tissue swelling including the dorsal aspect of the mid to distal foot. IMPRESSION: 1. Persistent sclerotic appearance of the calcaneus and interval loss of cortical integrity throughout much of the posterior aspect of the calcaneus. These findings are concerning for ongoing osteomyelitis. 2. Otherwise, no convincing evidence of acute fracture. 3. Soft tissue swelling as described. 4. Chronic/degenerative findings are as described. Medical Decision Making   I am the first provider for this patient. I reviewed the vital signs, available nursing notes, past medical history, past surgical history, family history and social history. Vital Signs-Reviewed the patient's vital signs.     Pulse Oximetry Analysis -  100% on room air (normal)    Records Reviewed: Nursing Notes (Time of Review: 7:26 PM)    ED Course: Progress Notes, Reevaluation, and Consults:  10:29 PM Consult: I discussed care with Dr. Darleen Domingo (podiatry). It was a standard discussion including patient history, chief complaint, available diagnostic results, and predicted treatment course. Dr. Darleen Domingo knows the patient well and recommends discharge and follow up with him on Monday morning. Provider Notes (Medical Decision Making): DDx include osteomyelitis, recurrent wound, anxiety, and peripheral neuropathy    Diagnosis     Clinical Impression: wound check    Disposition: discharge    Follow-up Information    None             Medication List      ASK your doctor about these medications    baclofen 10 mg tablet  Commonly known as:  LIORESAL  Take 1 Tab by mouth three (3) times daily. carvedilol 6.25 mg tablet  Commonly known as:  COREG  Take 1 Tab by mouth two (2) times a day. cholecalciferol (VITAMIN D3) 5,000 unit Tab tablet  Commonly known as:  VITAMIN D3     famotidine 20 mg tablet  Commonly known as:  PEPCID  Take 1 Tab by mouth nightly. folic acid 1 mg tablet  Commonly known as:  FOLVITE  Take 1 Tab by mouth daily. furosemide 40 mg tablet  Commonly known as:  LASIX  1 tab daily     gabapentin 300 mg capsule  Commonly known as:  NEURONTIN  Take 2 Caps by mouth two (2) times a day. Indications: NEUROPATHIC PAIN     insulin glargine 100 unit/mL injection  Commonly known as:  LANTUS U-100 INSULIN  5 Units by SubCUTAneous route nightly. Indications: type 2 diabetes mellitus     insulin lispro 100 unit/mL injection  Commonly known as:  HUMALOG  See sliding scale     metFORMIN  mg tablet  Commonly known as:  GLUCOPHAGE XR     ondansetron 4 mg disintegrating tablet  Commonly known as:  ZOFRAN ODT  1 Tab by SubLINGual route every six (6) hours as needed.           _______________________________    Attestations:  Scribe Attestation     Claudia Mcdaniels acting as a scribe for and in the presence of Mariah Walton MD      December 21, 2018 at 7:26 PM       Provider Attestation:      I personally performed the services described in the documentation, reviewed the documentation, as recorded by the scribe in my presence, and it accurately and completely records my words and actions.  December 21, 2018 at 7:26 PM - Laly Flynn MD    _______________________________

## 2018-12-22 NOTE — ED TRIAGE NOTES
Pt presents to ER for evaluation of possible infected wound to right foot. States she just completed IV antibiotic ~1 week ago & noticed an odor to the foot today. Home health saw pt today and referred her to ER for evaluation.

## 2018-12-26 NOTE — PROGRESS NOTES
This lady's BMP is fine and her creatinine actually was lower at 1.24 than it was a month ago. However, she still has significant elevation of her NT proBNP suggesting a component of persistent heart failure. See if her weight is down any further than it was 2 weeks ago and if not we may need to further adjust her diuretics and I probably would double her Lasix to start for a week and then recheck a BMP and BNP. Since she is paraplegic she really cannot weigh and therefore were simply going to have to go by the numbers to see if we got her failure adequately treated.  ES

## 2018-12-27 ENCOUNTER — HOSPITAL ENCOUNTER (INPATIENT)
Age: 68
LOS: 12 days | Discharge: HOME HEALTH | DRG: 629 | End: 2019-01-08
Attending: EMERGENCY MEDICINE | Admitting: HOSPITALIST
Payer: COMMERCIAL

## 2018-12-27 ENCOUNTER — APPOINTMENT (OUTPATIENT)
Dept: GENERAL RADIOLOGY | Age: 68
DRG: 629 | End: 2018-12-27
Attending: PHYSICIAN ASSISTANT
Payer: COMMERCIAL

## 2018-12-27 DIAGNOSIS — T14.8XXA WOUND INFECTION: ICD-10-CM

## 2018-12-27 DIAGNOSIS — L97.419 HEEL ULCERATION, RIGHT, WITH UNSPECIFIED SEVERITY (HCC): Primary | ICD-10-CM

## 2018-12-27 DIAGNOSIS — L08.9 WOUND INFECTION: ICD-10-CM

## 2018-12-27 LAB
ALBUMIN SERPL-MCNC: 3.5 G/DL (ref 3.4–5)
ALBUMIN/GLOB SERPL: 0.7 {RATIO} (ref 0.8–1.7)
ALP SERPL-CCNC: 137 U/L (ref 45–117)
ALT SERPL-CCNC: 29 U/L (ref 13–56)
ANION GAP SERPL CALC-SCNC: 6 MMOL/L (ref 3–18)
AST SERPL-CCNC: 21 U/L (ref 15–37)
BACTERIA SPEC CULT: NORMAL
BACTERIA SPEC CULT: NORMAL
BASOPHILS # BLD: 0 K/UL (ref 0–0.1)
BASOPHILS NFR BLD: 0 % (ref 0–2)
BILIRUB SERPL-MCNC: 0.6 MG/DL (ref 0.2–1)
BUN SERPL-MCNC: 18 MG/DL (ref 7–18)
BUN/CREAT SERPL: 14 (ref 12–20)
CALCIUM SERPL-MCNC: 9.5 MG/DL (ref 8.5–10.1)
CHLORIDE SERPL-SCNC: 107 MMOL/L (ref 100–108)
CO2 SERPL-SCNC: 29 MMOL/L (ref 21–32)
CREAT SERPL-MCNC: 1.28 MG/DL (ref 0.6–1.3)
DIFFERENTIAL METHOD BLD: ABNORMAL
EOSINOPHIL # BLD: 0.2 K/UL (ref 0–0.4)
EOSINOPHIL NFR BLD: 3 % (ref 0–5)
ERYTHROCYTE [DISTWIDTH] IN BLOOD BY AUTOMATED COUNT: 17.3 % (ref 11.6–14.5)
GLOBULIN SER CALC-MCNC: 4.9 G/DL (ref 2–4)
GLUCOSE BLD STRIP.AUTO-MCNC: 124 MG/DL (ref 70–110)
GLUCOSE SERPL-MCNC: 117 MG/DL (ref 74–99)
HCT VFR BLD AUTO: 30.6 % (ref 35–45)
HGB BLD-MCNC: 10.2 G/DL (ref 12–16)
LYMPHOCYTES # BLD: 1.6 K/UL (ref 0.9–3.6)
LYMPHOCYTES NFR BLD: 22 % (ref 21–52)
MCH RBC QN AUTO: 27.5 PG (ref 24–34)
MCHC RBC AUTO-ENTMCNC: 33.3 G/DL (ref 31–37)
MCV RBC AUTO: 82.5 FL (ref 74–97)
MONOCYTES # BLD: 0.4 K/UL (ref 0.05–1.2)
MONOCYTES NFR BLD: 6 % (ref 3–10)
NEUTS SEG # BLD: 4.9 K/UL (ref 1.8–8)
NEUTS SEG NFR BLD: 69 % (ref 40–73)
PLATELET # BLD AUTO: 227 K/UL (ref 135–420)
PMV BLD AUTO: 9 FL (ref 9.2–11.8)
POTASSIUM SERPL-SCNC: 3.5 MMOL/L (ref 3.5–5.5)
PROT SERPL-MCNC: 8.4 G/DL (ref 6.4–8.2)
RBC # BLD AUTO: 3.71 M/UL (ref 4.2–5.3)
SERVICE CMNT-IMP: NORMAL
SERVICE CMNT-IMP: NORMAL
SODIUM SERPL-SCNC: 142 MMOL/L (ref 136–145)
WBC # BLD AUTO: 7.2 K/UL (ref 4.6–13.2)

## 2018-12-27 PROCEDURE — 74011250636 HC RX REV CODE- 250/636: Performed by: HOSPITALIST

## 2018-12-27 PROCEDURE — 80053 COMPREHEN METABOLIC PANEL: CPT

## 2018-12-27 PROCEDURE — 77030027138 HC INCENT SPIROMETER -A

## 2018-12-27 PROCEDURE — 99282 EMERGENCY DEPT VISIT SF MDM: CPT

## 2018-12-27 PROCEDURE — 65270000029 HC RM PRIVATE

## 2018-12-27 PROCEDURE — 74011000250 HC RX REV CODE- 250: Performed by: HOSPITALIST

## 2018-12-27 PROCEDURE — 74011636637 HC RX REV CODE- 636/637: Performed by: HOSPITALIST

## 2018-12-27 PROCEDURE — 51702 INSERT TEMP BLADDER CATH: CPT

## 2018-12-27 PROCEDURE — 82962 GLUCOSE BLOOD TEST: CPT

## 2018-12-27 PROCEDURE — 85025 COMPLETE CBC W/AUTO DIFF WBC: CPT

## 2018-12-27 PROCEDURE — 74011250636 HC RX REV CODE- 250/636: Performed by: NURSE PRACTITIONER

## 2018-12-27 PROCEDURE — 87040 BLOOD CULTURE FOR BACTERIA: CPT

## 2018-12-27 PROCEDURE — 73620 X-RAY EXAM OF FOOT: CPT

## 2018-12-27 PROCEDURE — 74011000250 HC RX REV CODE- 250: Performed by: NURSE PRACTITIONER

## 2018-12-27 PROCEDURE — 74011250637 HC RX REV CODE- 250/637: Performed by: HOSPITALIST

## 2018-12-27 RX ORDER — FAMOTIDINE 20 MG/1
20 TABLET, FILM COATED ORAL
Status: DISCONTINUED | OUTPATIENT
Start: 2018-12-27 | End: 2019-01-08 | Stop reason: HOSPADM

## 2018-12-27 RX ORDER — CARVEDILOL 6.25 MG/1
6.25 TABLET ORAL 2 TIMES DAILY
Status: DISCONTINUED | OUTPATIENT
Start: 2018-12-27 | End: 2019-01-08 | Stop reason: HOSPADM

## 2018-12-27 RX ORDER — INSULIN LISPRO 100 [IU]/ML
0.05 INJECTION, SOLUTION INTRAVENOUS; SUBCUTANEOUS
Status: DISCONTINUED | OUTPATIENT
Start: 2018-12-27 | End: 2019-01-08 | Stop reason: HOSPADM

## 2018-12-27 RX ORDER — ACETAMINOPHEN 325 MG/1
650 TABLET ORAL
Status: DISCONTINUED | OUTPATIENT
Start: 2018-12-27 | End: 2019-01-08 | Stop reason: HOSPADM

## 2018-12-27 RX ORDER — LINEZOLID 2 MG/ML
600 INJECTION, SOLUTION INTRAVENOUS EVERY 12 HOURS
Status: COMPLETED | OUTPATIENT
Start: 2018-12-28 | End: 2018-12-28

## 2018-12-27 RX ORDER — GABAPENTIN 300 MG/1
600 CAPSULE ORAL 2 TIMES DAILY
Status: DISCONTINUED | OUTPATIENT
Start: 2018-12-27 | End: 2019-01-08 | Stop reason: HOSPADM

## 2018-12-27 RX ORDER — LINEZOLID 2 MG/ML
600 INJECTION, SOLUTION INTRAVENOUS EVERY 12 HOURS
Status: DISCONTINUED | OUTPATIENT
Start: 2018-12-27 | End: 2018-12-27

## 2018-12-27 RX ORDER — FOLIC ACID 1 MG/1
1 TABLET ORAL DAILY
Status: DISCONTINUED | OUTPATIENT
Start: 2018-12-28 | End: 2019-01-02

## 2018-12-27 RX ORDER — ENOXAPARIN SODIUM 100 MG/ML
40 INJECTION SUBCUTANEOUS EVERY 24 HOURS
Status: DISCONTINUED | OUTPATIENT
Start: 2018-12-27 | End: 2019-01-08 | Stop reason: HOSPADM

## 2018-12-27 RX ORDER — INSULIN GLARGINE 100 [IU]/ML
10 INJECTION, SOLUTION SUBCUTANEOUS
Status: DISCONTINUED | OUTPATIENT
Start: 2018-12-27 | End: 2019-01-08 | Stop reason: HOSPADM

## 2018-12-27 RX ORDER — FUROSEMIDE 40 MG/1
40 TABLET ORAL DAILY
Status: DISCONTINUED | OUTPATIENT
Start: 2018-12-28 | End: 2019-01-04

## 2018-12-27 RX ADMIN — LINEZOLID 600 MG: 600 INJECTION, SOLUTION INTRAVENOUS at 16:55

## 2018-12-27 RX ADMIN — FAMOTIDINE 20 MG: 20 TABLET ORAL at 21:28

## 2018-12-27 RX ADMIN — INSULIN GLARGINE 10 UNITS: 100 INJECTION, SOLUTION SUBCUTANEOUS at 22:07

## 2018-12-27 RX ADMIN — MEROPENEM 1 G: 1 INJECTION, POWDER, FOR SOLUTION INTRAVENOUS at 23:57

## 2018-12-27 RX ADMIN — GABAPENTIN 600 MG: 300 CAPSULE ORAL at 19:00

## 2018-12-27 RX ADMIN — ACETAMINOPHEN 650 MG: 325 TABLET ORAL at 20:33

## 2018-12-27 RX ADMIN — CARVEDILOL 6.25 MG: 6.25 TABLET, FILM COATED ORAL at 19:00

## 2018-12-27 RX ADMIN — MEROPENEM 1 G: 1 INJECTION, POWDER, FOR SOLUTION INTRAVENOUS at 15:38

## 2018-12-27 RX ADMIN — ENOXAPARIN SODIUM 40 MG: 100 INJECTION SUBCUTANEOUS at 19:00

## 2018-12-27 NOTE — ED TRIAGE NOTES
Pt. States \"Dr. Charlotte Cruz sent me here to be admitted for infection to my right foot\". Observed Prevalon boot in place along with ace wrap and wound vac.

## 2018-12-27 NOTE — ED PROVIDER NOTES
1:24 PM   76 y.o. female presents to ED C/O foot wound. Patient has a HX of paraplegia, cardiac cath, HTN, complete heart block, AICD. Patient was sent in by Dr Fabio Wells for infected right heel ulcer. Per patient was on Linezolid and meropenem via picc until 3 weeks ago, chronic right heel ulcer was heeling well and she was suppose to get a skin graft. However this week she has noted a strong odor from wound on right foot, although her wound vac is working well, and when seen by podiatry today there was a concern for worsening wound healing and development of osteo. Patient denies N/V, abdominal pain, CP, SOB. paitent does reports chills and hot flashes which has gotten worse this week, no documented fever. Patient denies any other symptoms or complaints. Past Medical History:   Diagnosis Date    Acute paraplegia ([facility] [address] 4/20/2017    Benign hypertensive heart disease with systolic CHF, NYHA class 2 ([facility] [address] 9/5/2012    Biventricular implantable cardioverter-defibrillator in situ 04/28/2005    Upgraded to BiV AICD; gen change 4/2008; pocket revision 10/2009; Abdominal - done on 8/22/2012 by Dr. Chyna Velasco Cardiac cath 08/15/1996    Patent coronaries. Elev LVEDP. EF 50-55%.  Cardiac echocardiogram 06/23/2015    Ltd study. EF 45-50%. Mild, diffuse hypk. Severe apical hypk. No mass or thrombus was clearly identified, although imaging was suboptimal.      Cardiac nuclear imaging test 06/19/2015    Fixed distal apical, distal septal defect more likely due to RV pacing than prior infarct. No ischemia. EF 46%. RWMA c/w RV pacing. Nondiagnostic EKG on pharm stress test.      Cardiovascular lower extremity venous duplex 09/04/2012    Acute, non-occlusive DVT in CFV on right. No DVT on left. No superficial thrombosis bilaterally.  Cardiovascular upper extremity venous duplex 08/27/2012    DVT in axillary vein on left. Left subclavian was not visualized.     Chronic anemia 9/5/2012    Chronic systolic heart failure (HCC)     Decreased calculated glomerular filtration rate (GFR) 3/30/2017    Calculated GFR equivalent to that of CKD stage 3 = 30-59 ml/min    Diabetic neuropathy associated with type 2 diabetes mellitus ([facility] [address] 6/28/2011    Difficult airway for intubation 08/22/2012    see anesthesia airway note    Dyslipidemia 6/28/2011    Gout     History of complete heart block 6/28/2011    History of Coumadin therapy     Anticoagulation for DVT of the LUE; Discontinued on 3/30/2017    History of deep venous thrombosis 9/5/2012    Left upper extremity    History of pyelonephritis 3/30/2017    Left bundle branch block (LBBB) on electrocardiogram 6/28/2011    Nonischemic cardiomyopathy ([address] 6/28/2011    Obesity (BMI 35.0-39.9 without comorbidity) 3/13/2017    Obstructive sleep apnea on CPAP 2/7/2012    Psoas abscess, right ([address] 4/20/2017    Psoas hematoma, right, secondary to anticoagulant therapy 3/30/2017    Type 2 diabetes mellitus with diabetic neuropathy ([address] 6/28/2011       Past Surgical History:   Procedure Laterality Date    HX CARPAL TUNNEL RELEASE  4/07    right     HX CHOLECYSTECTOMY  1994    HX HYSTERECTOMY  1973    HX OTHER SURGICAL  6/11/2012    AICD revision    HX PACEMAKER  4/28/2005    [employer] AICD         Family History:   Problem Relation Age of Onset    Cancer Father         Leukemia       Social History     Socioeconomic History    Marital status:      Spouse name: Not on file    Number of children: Not on file    Years of education: Not on file    Highest education level: Not on file   Social Needs    Financial resource strain: Not on file    Food insecurity - worry: Not on file    Food insecurity - inability: Not on file   [place] needs - medical: Not on file   [place] needs - non-medical: Not on file   Occupational History    Not on file   Tobacco Use    Smoking status: Never Smoker    Smokeless tobacco: Never Used   Substance and Sexual Activity    Alcohol use: No    Drug use: No    Sexual activity: Yes     Partners: Male   Other Topics Concern    Not on file   Social History Narrative    Not on file         ALLERGIES: Vancomycin; Ampicillin; Bactrim [sulfamethoxazole-trimethoprim]; Blueberry; Ciprofloxacin; Codeine; Crestor [rosuvastatin]; Darvocet a500 [propoxyphene n-acetaminophen]; Demerol [meperidine]; Levaquin [levofloxacin]; Lipitor [atorvastatin]; Magnesium oxide; Minocin [minocycline]; Pcn [penicillins]; Pravachol [pravastatin]; Shellfish derived; Sulfa (sulfonamide antibiotics); Ultracet [tramadol-acetaminophen]; Vicodin [hydrocodone-acetaminophen]; Vytorin 10-10 [ezetimibe-simvastatin]; and Percodan [oxycodone hcl-oxycodone-asa]    Review of Systems   Constitutional: Positive for chills and fatigue. Negative for appetite change and fever. HENT: Negative for congestion, rhinorrhea and sore throat. Respiratory: Negative for cough, shortness of breath and wheezing. Cardiovascular: Negative for chest pain and leg swelling. Gastrointestinal: Negative for abdominal pain, constipation, diarrhea, nausea and vomiting. Genitourinary: Negative for dysuria. Musculoskeletal: Negative for arthralgias and back pain. Skin: Positive for wound. Neurological: Negative for dizziness, syncope and headaches. All other systems reviewed and are negative. Vitals:    12/27/18 1236 12/27/18 1721   BP: 157/89 153/85   Pulse: 86 87   Resp: 20 16   Temp: 98.3 °F (36.8 °C) 97.1 °F (36.2 °C)   SpO2: 100% 99%   Weight: 109.3 kg (241 lb)    Height: 5' 7.5\" (1.715 m)             Physical Exam   Constitutional: She is oriented to person, place, and time. Obese chronically ill appearing patient. HENT:   Head: Atraumatic. Right Ear: External ear normal.   Left Ear: External ear normal.   Mouth/Throat: Oropharynx is clear and moist.   Cardiovascular: Normal rate, regular rhythm and intact distal pulses. Pulses:       Dorsalis pedis pulses are 1+ on the right side, and 1+ on the left side. Pulmonary/Chest: Effort normal and breath sounds normal.   Abdominal: She exhibits no distension. There is no tenderness. There is no guarding. Urinary cath in place   Neurological: She is alert and oriented to person, place, and time. Paraplegia    Skin:        Nursing note and vitals reviewed. MDM  Number of Diagnoses or Management Options  Heel ulceration, right, with unspecified severity ([address]): Wound infection:   Diagnosis management comments: MDM:  Plan - CBC, BMP, xray of right foot and blood cultures. -spoke with Dr Nereida Ryan - he reports significant changes in tissue granulation and odor since would was evaluated 1-2 weeks ago, I will leave wound vac in place and admit, also xray of right foot is concerning for developing osteomyelitis        Amount and/or Complexity of Data Reviewed  Clinical lab tests: ordered and reviewed  Tests in the radiology section of CPT®: ordered and reviewed      ED Course as of Dec 27 1819   Thu Dec 27, 2018   1401 CONSULT NOTE:   2:02 PM  I spoke with Dr Zackary Harrell   Specialty: podiatry   Discussed pt's hx, disposition, and available diagnostic and imaging results. Reviewed care plans. Consulting physician agrees with plans as outlined. Request admission by medicine and will come and consult on patient.       []   [address] Patient previously taking Linezolid, will consult pharmacy on available and meropenum, which has been ordered. []   1432 CONSULT NOTE:   2:32 PM  I spoke with Dr Crhistian Mathews  Specialty: Hospitalist  Discussed pt's hx, disposition, and available diagnostic and imaging results. Reviewed care plans. Consulting physician agrees with plans as outlined.  Agrees with plan for admission.       []   [phone] Discussed case with pharmacy and need for linezolid, this will be evaluated and ordered by pharmacy team  []      ED Course User Index  [] Rosana Kumar C, NP       Procedures        RESULTS:    XR FOOT RT AP/LAT   Final Result   IMPRESSION:        1. Soft tissue defect in the posterior heel region. 2.  Foreshortened calcaneus with sharply marginated posterior calcaneal region. Query recent surgical intervention. Possible subtle cortical defect at the   inferior margin. Developing infectious process cannot be excluded. Labs Reviewed   CBC WITH AUTOMATED DIFF - Abnormal; Notable for the following components:       Result Value    RBC 3.71 (*)     HGB 10.2 (*)     HCT 30.6 (*)     RDW 17.3 (*)     MPV 9.0 (*)     All other components within normal limits   METABOLIC PANEL, COMPREHENSIVE - Abnormal; Notable for the following components:    Glucose 117 (*)     GFR est AA 50 (*)     GFR est non-AA 41 (*)     Alk. phosphatase 137 (*)     Protein, total 8.4 (*)     Globulin 4.9 (*)     A-G Ratio 0.7 (*)     All other components within normal limits   CULTURE, BLOOD   CULTURE, BLOOD       Recent Results (from the past 12 hour(s))   CBC WITH AUTOMATED DIFF    Collection Time: 12/27/18  1:26 PM   Result Value Ref Range    WBC 7.2 4.6 - 13.2 K/uL    RBC 3.71 (L) 4.20 - 5.30 M/uL    HGB 10.2 (L) 12.0 - 16.0 g/dL    HCT 30.6 (L) 35.0 - 45.0 %    MCV 82.5 74.0 - 97.0 FL    MCH 27.5 24.0 - 34.0 PG    MCHC 33.3 31.0 - 37.0 g/dL    RDW 17.3 (H) 11.6 - 14.5 %    PLATELET 457 319 - 903 K/uL    MPV 9.0 (L) 9.2 - 11.8 FL    NEUTROPHILS 69 40 - 73 %    LYMPHOCYTES 22 21 - 52 %    MONOCYTES 6 3 - 10 %    EOSINOPHILS 3 0 - 5 %    BASOPHILS 0 0 - 2 %    ABS. NEUTROPHILS 4.9 1.8 - 8.0 K/UL    ABS. LYMPHOCYTES 1.6 0.9 - 3.6 K/UL    ABS. MONOCYTES 0.4 0.05 - 1.2 K/UL    ABS. EOSINOPHILS 0.2 0.0 - 0.4 K/UL    ABS.  BASOPHILS 0.0 0.0 - 0.1 K/UL    DF AUTOMATED     METABOLIC PANEL, COMPREHENSIVE    Collection Time: 12/27/18  1:26 PM   Result Value Ref Range    Sodium 142 136 - 145 mmol/L    Potassium 3.5 3.5 - 5.5 mmol/L    Chloride 107 100 - 108 mmol/L    CO2 29 21 - 32 mmol/L Anion gap 6 3.0 - 18 mmol/L    Glucose 117 (H) 74 - 99 mg/dL    BUN 18 7.0 - 18 MG/DL    Creatinine 1.28 0.6 - 1.3 MG/DL    BUN/Creatinine ratio 14 12 - 20      GFR est AA 50 (L) >60 ml/min/1.73m2    GFR est non-AA 41 (L) >60 ml/min/1.73m2    Calcium 9.5 8.5 - 10.1 MG/DL    Bilirubin, total 0.6 0.2 - 1.0 MG/DL    ALT (SGPT) 29 13 - 56 U/L    AST (SGOT) 21 15 - 37 U/L    Alk. phosphatase 137 (H) 45 - 117 U/L    Protein, total 8.4 (H) 6.4 - 8.2 g/dL    Albumin 3.5 3.4 - 5.0 g/dL    Globulin 4.9 (H) 2.0 - 4.0 g/dL    A-G Ratio 0.7 (L) 0.8 - 1.7         PROGRESS NOTE:   1:24 PM   Initial assessment completed. Written by Elvis ZHOU    DISPOSITION - Admitted     CLINICAL IMPRESSION:    1. Heel ulceration, right, with unspecified severity ([facility] [address]    2.  Wound infection            Written by Elvis ZHOU

## 2018-12-28 LAB
ANION GAP SERPL CALC-SCNC: 5 MMOL/L (ref 3–18)
ATRIAL RATE: 75 BPM
BASOPHILS # BLD: 0 K/UL (ref 0–0.1)
BASOPHILS NFR BLD: 0 % (ref 0–2)
BUN SERPL-MCNC: 15 MG/DL (ref 7–18)
BUN/CREAT SERPL: 12 (ref 12–20)
CALCIUM SERPL-MCNC: 8.7 MG/DL (ref 8.5–10.1)
CALCULATED P AXIS, ECG09: 45 DEGREES
CALCULATED R AXIS, ECG10: 70 DEGREES
CALCULATED T AXIS, ECG11: 36 DEGREES
CHLORIDE SERPL-SCNC: 109 MMOL/L (ref 100–108)
CO2 SERPL-SCNC: 28 MMOL/L (ref 21–32)
CREAT SERPL-MCNC: 1.24 MG/DL (ref 0.6–1.3)
DIAGNOSIS, 93000: NORMAL
DIFFERENTIAL METHOD BLD: ABNORMAL
EOSINOPHIL # BLD: 0.2 K/UL (ref 0–0.4)
EOSINOPHIL NFR BLD: 4 % (ref 0–5)
ERYTHROCYTE [DISTWIDTH] IN BLOOD BY AUTOMATED COUNT: 17.6 % (ref 11.6–14.5)
GLUCOSE BLD STRIP.AUTO-MCNC: 127 MG/DL (ref 70–110)
GLUCOSE BLD STRIP.AUTO-MCNC: 132 MG/DL (ref 70–110)
GLUCOSE BLD STRIP.AUTO-MCNC: 84 MG/DL (ref 70–110)
GLUCOSE SERPL-MCNC: 123 MG/DL (ref 74–99)
HCT VFR BLD AUTO: 26.8 % (ref 35–45)
HGB BLD-MCNC: 8.6 G/DL (ref 12–16)
INR PPP: 1 (ref 0.8–1.2)
LYMPHOCYTES # BLD: 1.3 K/UL (ref 0.9–3.6)
LYMPHOCYTES NFR BLD: 23 % (ref 21–52)
MCH RBC QN AUTO: 26.7 PG (ref 24–34)
MCHC RBC AUTO-ENTMCNC: 32.1 G/DL (ref 31–37)
MCV RBC AUTO: 83.2 FL (ref 74–97)
MONOCYTES # BLD: 0.5 K/UL (ref 0.05–1.2)
MONOCYTES NFR BLD: 10 % (ref 3–10)
NEUTS SEG # BLD: 3.5 K/UL (ref 1.8–8)
NEUTS SEG NFR BLD: 63 % (ref 40–73)
P-R INTERVAL, ECG05: 138 MS
PLATELET # BLD AUTO: 188 K/UL (ref 135–420)
PMV BLD AUTO: 8.9 FL (ref 9.2–11.8)
POTASSIUM SERPL-SCNC: 3.2 MMOL/L (ref 3.5–5.5)
PROTHROMBIN TIME: 13.2 SEC (ref 11.5–15.2)
Q-T INTERVAL, ECG07: 488 MS
QRS DURATION, ECG06: 198 MS
QTC CALCULATION (BEZET), ECG08: 544 MS
RBC # BLD AUTO: 3.22 M/UL (ref 4.2–5.3)
SODIUM SERPL-SCNC: 142 MMOL/L (ref 136–145)
VENTRICULAR RATE, ECG03: 75 BPM
WBC # BLD AUTO: 5.6 K/UL (ref 4.6–13.2)

## 2018-12-28 PROCEDURE — 74011000250 HC RX REV CODE- 250: Performed by: HOSPITALIST

## 2018-12-28 PROCEDURE — 77030035230

## 2018-12-28 PROCEDURE — 65270000029 HC RM PRIVATE

## 2018-12-28 PROCEDURE — 82962 GLUCOSE BLOOD TEST: CPT

## 2018-12-28 PROCEDURE — 93005 ELECTROCARDIOGRAM TRACING: CPT

## 2018-12-28 PROCEDURE — 74011250637 HC RX REV CODE- 250/637: Performed by: HOSPITALIST

## 2018-12-28 PROCEDURE — 74011636637 HC RX REV CODE- 636/637: Performed by: HOSPITALIST

## 2018-12-28 PROCEDURE — 74011250636 HC RX REV CODE- 250/636: Performed by: HOSPITALIST

## 2018-12-28 PROCEDURE — 85025 COMPLETE CBC W/AUTO DIFF WBC: CPT

## 2018-12-28 PROCEDURE — 77030018836 HC SOL IRR NACL ICUM -A

## 2018-12-28 PROCEDURE — 85610 PROTHROMBIN TIME: CPT

## 2018-12-28 PROCEDURE — 80048 BASIC METABOLIC PNL TOTAL CA: CPT

## 2018-12-28 PROCEDURE — 36415 COLL VENOUS BLD VENIPUNCTURE: CPT

## 2018-12-28 RX ORDER — DIPHENHYDRAMINE HYDROCHLORIDE 50 MG/ML
12.5 INJECTION, SOLUTION INTRAMUSCULAR; INTRAVENOUS
Status: DISCONTINUED | OUTPATIENT
Start: 2018-12-28 | End: 2019-01-03

## 2018-12-28 RX ADMIN — LINEZOLID 600 MG: 600 INJECTION, SOLUTION INTRAVENOUS at 02:46

## 2018-12-28 RX ADMIN — MEROPENEM 1 G: 1 INJECTION, POWDER, FOR SOLUTION INTRAVENOUS at 16:10

## 2018-12-28 RX ADMIN — GABAPENTIN 600 MG: 300 CAPSULE ORAL at 18:27

## 2018-12-28 RX ADMIN — INSULIN GLARGINE 10 UNITS: 100 INJECTION, SOLUTION SUBCUTANEOUS at 21:53

## 2018-12-28 RX ADMIN — FUROSEMIDE 40 MG: 40 TABLET ORAL at 08:49

## 2018-12-28 RX ADMIN — CARVEDILOL 6.25 MG: 6.25 TABLET, FILM COATED ORAL at 08:49

## 2018-12-28 RX ADMIN — ACETAMINOPHEN 650 MG: 325 TABLET ORAL at 21:53

## 2018-12-28 RX ADMIN — CARVEDILOL 6.25 MG: 6.25 TABLET, FILM COATED ORAL at 18:27

## 2018-12-28 RX ADMIN — ACETAMINOPHEN 650 MG: 325 TABLET ORAL at 16:11

## 2018-12-28 RX ADMIN — FOLIC ACID 1 MG: 1 TABLET ORAL at 08:49

## 2018-12-28 RX ADMIN — FAMOTIDINE 20 MG: 20 TABLET ORAL at 21:53

## 2018-12-28 RX ADMIN — DIPHENHYDRAMINE HYDROCHLORIDE 12.5 MG: 50 INJECTION, SOLUTION INTRAMUSCULAR; INTRAVENOUS at 16:10

## 2018-12-28 RX ADMIN — ACETAMINOPHEN 650 MG: 325 TABLET ORAL at 08:49

## 2018-12-28 RX ADMIN — MEROPENEM 1 G: 1 INJECTION, POWDER, FOR SOLUTION INTRAVENOUS at 08:49

## 2018-12-28 RX ADMIN — GABAPENTIN 600 MG: 300 CAPSULE ORAL at 08:49

## 2018-12-28 RX ADMIN — ENOXAPARIN SODIUM 40 MG: 100 INJECTION SUBCUTANEOUS at 18:27

## 2018-12-28 RX ADMIN — ACETAMINOPHEN 650 MG: 325 TABLET ORAL at 01:58

## 2018-12-28 NOTE — PROGRESS NOTES
Bedside and Verbal shift change report given to Cliff Luna (oncoming nurse) by Margie Marie RN (offgoing nurse). Report included the following information SBAR, Kardex, Procedure Summary, Intake/Output, MAR and Recent Results.

## 2018-12-28 NOTE — PROGRESS NOTES
conducted an initial consultation and Spiritual Assessment for Warden Parks, who is a 76 y.o.,female. Patients Primary Language is: [place]. According to the patients EMR Voodoo Affiliation is: Ema Meek. The reason the Patient came to the hospital is:  
Patient Active Problem List  
 Diagnosis Date Noted  Infected wound 12/27/2018  Heel ulcer ([address] 10/17/2018  Osteomyelitis ([address] 08/16/2018  Foot osteomyelitis, right ([address] 08/16/2018  Severe obesity (BMI 35.0-39. 9) with comorbidity ([address] 05/24/2018  Obesity (BMI 30.0-34.9) 05/04/2018  Type 2 diabetes with nephropathy ([address] 03/12/2018  Neurogenic bladder 10/12/2017  Biventricular cardiac pacemaker in situ 09/13/2017  Hypervolemia 06/06/2017  Anemia 06/06/2017  Acute paraplegia ([address] 04/20/2017  Sepsis ([address] 04/20/2017  Psoas abscess, right ([address] 04/20/2017  Krueger catheter in place on admission 04/20/2017  Urinary tract infection due to Enterococcus 04/20/2017  Group B streptococcal infection 04/20/2017  Gout  History of Coumadin therapy  Decreased calculated glomerular filtration rate (GFR) 03/30/2017  History of pyelonephritis 03/30/2017  Iliopsoas muscle hematoma 03/30/2017  Psoas hematoma, right, secondary to anticoagulant therapy 03/30/2017  Impaired mobility and ADLs 03/30/2017  Obesity (BMI 35.0-39.9 without comorbidity) 03/13/2017  Chronic systolic heart failure ([address]  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  Benign hypertensive heart disease with systolic CHF, NYHA class 2 ([address] 09/05/2012  Chronic anemia 09/05/2012  History of deep venous thrombosis 09/05/2012  Anticoagulated on Coumadin 09/05/2012  Difficult airway for intubation 08/22/2012  AICD generator infection ([address] 08/20/2012  Obstructive sleep apnea on CPAP 02/07/2012  Nonischemic cardiomyopathy ([address] 06/28/2011  History of complete heart block 06/28/2011  Left bundle branch block (LBBB) on electrocardiogram 06/28/2011  Type 2 diabetes mellitus with diabetic neuropathy ([facility] [address] 06/28/2011  Dyslipidemia 06/28/2011  Diabetic neuropathy associated with type 2 diabetes mellitus ([facility] [address] 06/28/2011  Biventricular implantable cardioverter-defibrillator in situ 04/28/2005 The  provided the following Interventions: 
Initiated a relationship of care and support. Explored issues of braulio, belief, spirituality and Methodist/ritual needs while hospitalized. Listened empathically. Provided chaplaincy education. Provided information about Spiritual Care Services. Offered prayer and assurance of continued prayers on patient's behalf. Chart reviewed. The following outcomes where achieved: 
Patient shared limited information about both their medical narrative and spiritual journey/beliefs.  confirmed Patient's [employer] Affiliation. Patient processed feeling about current hospitalization. Patient expressed gratitude for 's visit. Assessment: 
Patient does not have any Methodist/cultural needs that will affect patients preferences in health care. There are no spiritual or Methodist issues which require intervention at this time. Plan: 
Chaplains will continue to follow and will provide pastoral care on an as needed/requested basis.  recommends bedside caregivers page  on duty if patient shows signs of acute spiritual or emotional distress. Chaplain Zachary Delvalle [facility]  
[phone]

## 2018-12-28 NOTE — PROGRESS NOTES
Reason for Admission:   Infected wound RRAT Score:     32 Resources/supports as identified by patient/family:    
             
Top Challenges facing patient (as identified by patient/family and CM): Finances/Medication cost?       
           
Transportation? spouse Support system or lack thereof? Living arrangements? Wheelchair bound, stays ground floor sunroom Self-care/ADLs/Cognition? Alert and oriented x4, Dresses herself Current Advanced Directive/Advance Care Plan:   
                       
Plan for utilizing home health: Active with [facility] Likelihood of readmission: high red Transition of Care Plan:   Demographic information confirmed by patient and PCP. Patient has her own wheelchair, no other DME.  is Princess Arce at [phone] and will transport at d/c. 
[facility] comes out 2xweek to change wound vac. Patient brought wound vac into hospital. 
 
Care Management Interventions PCP Verified by CM: Yes 
Palliative Care Criteria Met (RRAT>21 & CHF Dx)?: No 
Mode of Transport at Discharge: Other (see comment)() Transition of Care Consult (CM Consult): Home Health [address] [place] [address] Road: No 
Reason Outside [facility]: Patient already serviced by other home care/hospice agency Current Support Network: Lives with Spouse Confirm Follow Up Transport: Family Plan discussed with Pt/Family/Caregiver: Yes [employer] Information Provided?: No 
Discharge Location Discharge Placement: Home with home health Tiffanie Choe RN, BSN  [phone]

## 2018-12-28 NOTE — ROUTINE PROCESS
Called doctor about patients pain he ordered tylenol due to her many allergies. Patient is alert and oriented times three with no signs or symptoms of distress. She is a paraplegic and her feet are elevated with dee boots on. She has a wound vac attached to her leg and it has a foul smell.

## 2018-12-28 NOTE — PROGRESS NOTES
Bedside and Verbal shift change report given to Imelda Miller RN (oncoming nurse) by Vanessa Henry RN (offgoing nurse). Report included the following information SBAR, Kardex, Procedure Summary, Intake/Output, MAR and Recent Results.

## 2018-12-28 NOTE — H&P
History & Physical    Patient: Cuate Shaikh MRN: [id number]  [facility]: [id number]    YOB: 1950  Age: 76 y.o. Sex: female      DOA: 12/27/2018    Chief Complaint   Patient presents with    Wound Infection     right heel/wound vac          HPI:     Cuate Shaikh is a 76 y.o. female who has diabetic, cardiomyopathy s/p ICD (non ischemic), CVA, urine incontinence on chronic perez, long standing right heel ulcer. She has had the ulcer for more than a year with no complete healing. She states she has been doing well and the ulcer had been clean about 2 weeks ago. She was told that she was a candidate for grafting. However in the last 10 days she has noted increased discharge and smell from the ulcer area. She is a patient of Dr. Cammy Mustafa and she has had cleaning done and a wound vac done. She comes to hospital for increasing pain and suspicion of osteomyelitis based on outpatient work up. Pain is about 8/10. Has chills, but no fever. She has a hx of treatment for ESBL organisms  She has an extensive list of allergies. Past Medical History:   Diagnosis Date    Acute paraplegia [facility] 4/20/2017    Benign hypertensive heart disease with systolic CHF, NYHA class 2 ([address] 9/5/2012    Biventricular implantable cardioverter-defibrillator in situ 04/28/2005    Upgraded to BiV AICD; gen change 4/2008; pocket revision 10/2009; Abdominal - done on 8/22/2012 by Dr. Daisy Hensley Cardiac cath 08/15/1996    Patent coronaries. Elev LVEDP. EF 50-55%.  Cardiac echocardiogram 06/23/2015    Ltd study. EF 45-50%. Mild, diffuse hypk. Severe apical hypk. No mass or thrombus was clearly identified, although imaging was suboptimal.      Cardiac nuclear imaging test 06/19/2015    Fixed distal apical, distal septal defect more likely due to RV pacing than prior infarct. No ischemia. EF 46%. RWMA c/w RV pacing.   Nondiagnostic EKG on pharm stress test.      Cardiovascular lower extremity venous duplex 09/04/2012    Acute, non-occlusive DVT in CFV on right. No DVT on left. No superficial thrombosis bilaterally.  Cardiovascular upper extremity venous duplex 08/27/2012    DVT in axillary vein on left. Left subclavian was not visualized.     Chronic anemia 9/5/2012    Chronic systolic heart failure (HCC)     Decreased calculated glomerular filtration rate (GFR) 3/30/2017    Calculated GFR equivalent to that of CKD stage 3 = 30-59 ml/min    Diabetic neuropathy associated with type 2 diabetes mellitus ([address] 6/28/2011    Difficult airway for intubation 08/22/2012    see anesthesia airway note    Dyslipidemia 6/28/2011    Gout     History of complete heart block 6/28/2011    History of Coumadin therapy     Anticoagulation for DVT of the LUE; Discontinued on 3/30/2017    History of deep venous thrombosis 9/5/2012    Left upper extremity    History of pyelonephritis 3/30/2017    Left bundle branch block (LBBB) on electrocardiogram 6/28/2011    Nonischemic cardiomyopathy ([address] 6/28/2011    Obesity (BMI 35.0-39.9 without comorbidity) 3/13/2017    Obstructive sleep apnea on CPAP 2/7/2012    Psoas abscess, right ([address] 4/20/2017    Psoas hematoma, right, secondary to anticoagulant therapy 3/30/2017    Type 2 diabetes mellitus with diabetic neuropathy ([address] 6/28/2011       Past Surgical History:   Procedure Laterality Date    HX CARPAL TUNNEL RELEASE  4/07    right     HX CHOLECYSTECTOMY  1994    HX HYSTERECTOMY  1973    HX OTHER SURGICAL  6/11/2012    AICD revision    HX PACEMAKER  4/28/2005    [employer] AICD       Family History   Problem Relation Age of Onset    Cancer Father         Leukemia       Social History     Socioeconomic History    Marital status:      Spouse name: Not on file    Number of children: Not on file    Years of education: Not on file    Highest education level: Not on file   Tobacco Use    Smoking status: Never Smoker    Smokeless tobacco: Never Used Substance and Sexual Activity    Alcohol use: No    Drug use: No    Sexual activity: Yes     Partners: Male       Prior to Admission medications    Medication Sig Start Date End Date Taking? Authorizing Provider   carvedilol (COREG) 6.25 mg tablet Take 1 Tab by mouth two (2) times a day. 12/17/18  Yes Taye Dupont,    furosemide (LASIX) 40 mg tablet 1 tab daily 12/17/18  Yes Taye Dupont DO   metFORMIN ER (GLUCOPHAGE XR) 500 mg tablet  10/25/18  Yes Provider, Historical   ondansetron (ZOFRAN ODT) 4 mg disintegrating tablet 1 Tab by SubLINGual route every six (6) hours as needed. 10/23/18  Yes Kelly Rodney NP   baclofen (LIORESAL) 10 mg tablet Take 1 Tab by mouth three (3) times daily. 10/23/18  Yes Vilma LEBLANC NP   cholecalciferol, VITAMIN D3, (VITAMIN D3) 5,000 unit tab tablet Take  by mouth daily. Yes Provider, Historical   gabapentin (NEURONTIN) 300 mg capsule Take 2 Caps by mouth two (2) times a day. Indications: NEUROPATHIC PAIN 6/16/17  Yes Vicky Fritz MD   insulin lispro (HUMALOG) 100 unit/mL injection See sliding scale  Patient taking differently: 4 Units Before breakfast, lunch, and dinner. See sliding scale 6/16/17  Yes Vicky Fritz MD   folic acid (FOLVITE) 1 mg tablet Take 1 Tab by mouth daily. 5/25/17  Yes Amy Cervantes MD   insulin glargine (LANTUS) 100 unit/mL injection 5 Units by SubCUTAneous route nightly. Indications: type 2 diabetes mellitus  Patient taking differently: 20 Units by SubCUTAneous route nightly. 5/25/17  Yes Amy Cervantes MD   famotidine (PEPCID) 20 mg tablet Take 1 Tab by mouth nightly. Patient taking differently: Take 20 mg by mouth daily as needed.  5/25/17  Yes Amy Cervantes MD       Allergies   Allergen Reactions    Vancomycin Itching    Ampicillin Itching    Bactrim [Sulfamethoxazole-Trimethoprim] Unknown (comments)    Blueberry Swelling     Causes throat swelling    Ciprofloxacin Itching    Codeine Other (comments) Jumpy feeling    Crestor [Rosuvastatin] Itching    Darvocet A500 [Propoxyphene N-Acetaminophen] Itching    Demerol [Meperidine] Itching    Levaquin [Levofloxacin] Itching    Lipitor [Atorvastatin] Myalgia    Magnesium Oxide Itching     nausea    Minocin [Minocycline] Unknown (comments)    Pcn [Penicillins] Itching    Pravachol [Pravastatin] Swelling     Swelling in mouth. Not allergic per patient, takes at home    Shellfish Derived Unknown (comments)    Sulfa (Sulfonamide Antibiotics) Itching    Ultracet [Tramadol-Acetaminophen] Itching    Vicodin [Hydrocodone-Acetaminophen] Unknown (comments)    Vytorin 10-10 [Ezetimibe-Simvastatin] Myalgia    Percodan [Oxycodone Hcl-Oxycodone-Asa] Itching       Review of Systems    Refer to HPI for positive findings. All other systems reviewed and are negative    Physical Exam:     Physical Exam:  Visit Vitals  /60 (BP 1 Location: Left arm, BP Patient Position: At rest)   Pulse 85   Temp 97.4 °F (36.3 °C)   Resp 20   Ht 5' 7.5\" (1.715 m)   Wt 109.3 kg (241 lb)   LMP  (LMP Unknown)   SpO2 99%   BMI 37.19 kg/m²      O2 Device: Room air    Temp (24hrs), Av.5 °F (36.4 °C), Min:97.1 °F (36.2 °C), Max:98.3 °F (36.8 °C)       No intake/output data recorded.  0701 -  1900  In: -   Out:  [[facility]:[id number]]    General:  Alert, cooperative, no distress, appears stated age. Head:  Normocephalic, without obvious abnormality, atraumatic. Eyes:  Conjunctivae/corneas clear. PERRL, EOMs intact. Nose: Nares normal. No drainage or sinus tenderness. Throat: Lips, mucosa, and tongue normal. Teeth and gums normal.   Neck: Supple, symmetrical, trachea midline, no adenopathy, thyroid: no enlargement/tenderness/nodules, no carotid bruit and no JVD. Back:   ROM normal. No CVA tenderness. Lungs:   Clear to auscultation bilaterally. Chest wall:  No tenderness or deformity. Heart:  Regular rate and rhythm, S1, S2 normal, no murmur, click, rub or gallop. Abdomen: Soft, non-tender. Bowel sounds normal. No masses,  No organomegaly. Extremities: Extremities normal, atraumatic, no cyanosis or edema. Pulses: 2+ and symmetric all extremities. Skin: Skin color, texture, turgor normal. No rashes or lesions   Neurologic: CNII-XII intact. No focal motor or sensory deficit. Labs Reviewed:    Recent Results (from the past 24 hour(s))   CBC WITH AUTOMATED DIFF    Collection Time: 12/27/18  1:26 PM   Result Value Ref Range    WBC 7.2 4.6 - 13.2 K/uL    RBC 3.71 (L) 4.20 - 5.30 M/uL    HGB 10.2 (L) 12.0 - 16.0 g/dL    HCT 30.6 (L) 35.0 - 45.0 %    MCV 82.5 74.0 - 97.0 FL    MCH 27.5 24.0 - 34.0 PG    MCHC 33.3 31.0 - 37.0 g/dL    RDW 17.3 (H) 11.6 - 14.5 %    PLATELET 141 473 - 391 K/uL    MPV 9.0 (L) 9.2 - 11.8 FL    NEUTROPHILS 69 40 - 73 %    LYMPHOCYTES 22 21 - 52 %    MONOCYTES 6 3 - 10 %    EOSINOPHILS 3 0 - 5 %    BASOPHILS 0 0 - 2 %    ABS. NEUTROPHILS 4.9 1.8 - 8.0 K/UL    ABS. LYMPHOCYTES 1.6 0.9 - 3.6 K/UL    ABS. MONOCYTES 0.4 0.05 - 1.2 K/UL    ABS. EOSINOPHILS 0.2 0.0 - 0.4 K/UL    ABS. BASOPHILS 0.0 0.0 - 0.1 K/UL    DF AUTOMATED     METABOLIC PANEL, COMPREHENSIVE    Collection Time: 12/27/18  1:26 PM   Result Value Ref Range    Sodium 142 136 - 145 mmol/L    Potassium 3.5 3.5 - 5.5 mmol/L    Chloride 107 100 - 108 mmol/L    CO2 29 21 - 32 mmol/L    Anion gap 6 3.0 - 18 mmol/L    Glucose 117 (H) 74 - 99 mg/dL    BUN 18 7.0 - 18 MG/DL    Creatinine 1.28 0.6 - 1.3 MG/DL    BUN/Creatinine ratio 14 12 - 20      GFR est AA 50 (L) >60 ml/min/1.73m2    GFR est non-AA 41 (L) >60 ml/min/1.73m2    Calcium 9.5 8.5 - 10.1 MG/DL    Bilirubin, total 0.6 0.2 - 1.0 MG/DL    ALT (SGPT) 29 13 - 56 U/L    AST (SGOT) 21 15 - 37 U/L    Alk.  phosphatase 137 (H) 45 - 117 U/L    Protein, total 8.4 (H) 6.4 - 8.2 g/dL    Albumin 3.5 3.4 - 5.0 g/dL    Globulin 4.9 (H) 2.0 - 4.0 g/dL    A-G Ratio 0.7 (L) 0.8 - 1.7     GLUCOSE, POC    Collection Time: 12/27/18 10:03 PM   Result Value Ref Range    Glucose (POC) 124 (H) 70 - 110 mg/dL       Procedures/imaging: see electronic medical records for all procedures/Xrays and details which were not copied into this note but were reviewed prior to creation of Plan        Assessment/Plan     1. Infected Right heel ucler   2. Rule out osteomyelitis  3. Non ischemic cardiomyopathy s/p ICD  4. Type 2 DM, last A1c 8.7  5. Urinary incontinence  6.  Hx of ESBL    Plan    -Contact Isolation  -Start Linezolid and Meropenem (patient has tolerated them in the past)  -Podiatry consult to Dr. Nancy Hancock  - Lantus and SSI for BG control  - pain control  - NPO after midnight.   - additional management per podiatry recommendations    Active Problems:    Infected wound (12/27/2018)        Jerry Jiang MD  December 28, 2018

## 2018-12-28 NOTE — DIABETES MGMT
Diabetes Patient/Family Education RecordFactors That  May Influence Patients Ability  to Learn or  Comply with Recommendations []   Language barrier    []   Cultural needs   []   Motivation  
 []   Cognitive limitation    []   Physical   [x]   Education  
 []   Physiological factors   []   Hearing/vision/speaking impairment   []   Sabianist beliefs []   Financial factors   []  Other:   []  No factors identified at this time. Person Instructed: [x]   Patient   []   Family   []  Other Preference for Learning: 
 [x]   Verbal   []   Written   []  Demonstration Level of Comprehension & Competence:   
[x]  Good                                      [] Fair                                     []  Poor                             []  Needs Reinforcement  
[x]  Teachback completed Education Component:  
[x]  Medication management, including how to administer insulin (if appropriate) and potential medication interactions Takes Lantus 20 units every bedtime, humalog 4 units 3 times daily with meals and metformin 500 mg daily. She is supposed to take metformin 500 mg bid, but since she has been on antibiotics for her foot, she has only been taking it once daily r/t diarrhea. Once she completes her antibiotics, she plans on taking her metformin twice a day as prescribed. []  Nutritional management -obtain usual meal pattern  
[]  Exercise [x]  Signs, symptoms, and treatment of hyperglycemia and hypoglycemia [x] Prevention, recognition and treatment of hyperglycemia and hypoglycemia [x]  Importance of blood glucose monitoring and how to obtain a blood glucose meter has a glucometer and supplies at home. States she checks her blood glucose 1-3 times daily. \"I definitely check every morning and it runs around 104. \"  Has a meter through [facility] and she states they follow up with her.  
[]  Instruction on use of the blood glucose meter [x]  Discuss the importance of HbA1C monitoring 8.7% equivalent to an average blood glucose of 197 mg/dl over the past 2-3 months. Was 10.2% in August 2018. [x]  Sick day guidelines  
[x]  Proper use and disposal of lancets, needles, syringes or insulin pens (if appropriate) [x]  Potential long-term complications (retinopathy, kidney disease, neuropathy, foot care) [x] Information about whom to contact in case of emergency or for more information   
[x]  Goal:  Patient/family will demonstrate understanding of Diabetes Self Management Skills by: (date) _______ Plan for post-discharge education or self-management support: 
  [] Outpatient class schedule provided            [] Patient Declined 
  [] Scheduled for outpatient classes (date) _______ Verify: 
Does patient understand how diabetes medications work? yes Does patient know what their most recent A1c is? yes Does patient monitor glucose at home? yes Does patient have difficulty obtaining diabetes medications? no 
  
Rommel Pichardo RN CDE Ext [phone]

## 2018-12-28 NOTE — DIABETES MGMT
NUTRITIONAL ASSESSMENT GLYCEMIC CONTROL/ PLAN OF CARE Nolan Goss           76 y.o.           12/27/2018 1. Heel ulceration, right, with unspecified severity ([address] 2. Wound infection INTERVENTIONS/PLAN:  
Continue inpatient monitoring and intervention ASSESSMENT:  
Pt is a 76year old female with a past medical history significant for diabetes, cardiomyopathy, CVA, and heel ulcer. Blood glucose currently within targets. Noted diet advanced. Pt reports normally having a good appetite, but appetite has been decreased over past couple of days. Diabetes Management: Recent blood glucose:   
10/23/2018 11:55 10/23/2018 15:36 12/27/2018 22:03 12/28/2018 06:18  
151 (H) 139 (H) 124 (H) 127 (H) Within target range (non-ICU: <140; ICU<180): [x] Yes   []  No 
 
Current Insulin regimen:  
Lantus insulin 10 units every bedtime Lispro insulin 5 units TID with meals Home medication/insulin regimen:  Lantus 20 units every bedtime, humalog 4 units 3 times daily with meals and metformin 500 mg daily. HbA1c: 8.7% Adequate glycemic control PTA:  [] Yes  [x] No 
  
SUBJECTIVE/OBJECTIVE:  
 
Diet: Diabetic consistent carbohydrate Medications: [x]  Reviewed Most Recent POC Glucose:  
Recent Labs  
  12/28/18 
0430 12/27/18 
1326 * 117* Labs:  
Lab Results Component Value Date/Time Hemoglobin A1c 8.7 (H) 10/18/2018 12:50 AM  
 
Lab Results Component Value Date/Time Sodium 142 12/28/2018 04:30 AM  
 Potassium 3.2 (L) 12/28/2018 04:30 AM  
 Chloride 109 (H) 12/28/2018 04:30 AM  
 CO2 28 12/28/2018 04:30 AM  
 Anion gap 5 12/28/2018 04:30 AM  
 Glucose 123 (H) 12/28/2018 04:30 AM  
 BUN 15 12/28/2018 04:30 AM  
 Creatinine 1.24 12/28/2018 04:30 AM  
 Calcium 8.7 12/28/2018 04:30 AM  
 Magnesium 2.0 09/04/2018 12:25 PM  
 Phosphorus 4.0 05/15/2017 06:04 AM  
 Albumin 3.5 12/27/2018 01:26 PM  
 
Anthropometrics:   BMI (calculated): 37.2 Wt Readings from Last 1 Encounters:  
12/27/18 109.3 kg (241 lb) Ht Readings from Last 1 Encounters:  
12/27/18 5' 7.5\" (1.715 m) Estimated Nutrition Needs:    8628-4582 Kcal/day,  grams protein/day Based on:   [x] Actual BW    [] IBW   []  Adjusted BW   
 
Nutrition Diagnoses: Altered nutrition related lab value related to diabetes as evidenced by Hemoglobin A1c of 8.7% Nutrition Interventions: coordination of care, assessment of home management Goal: Blood glucose will be within target range of  mg/dL by 12/31/18 Nutrition Monitoring and Evaluation []     Monitor po intake on meal rounds 
[x]     Continue inpatient monitoring and intervention 
[]     Other: 
 
Tonja Red RD, CDE pgr [phone]

## 2018-12-28 NOTE — CONSULTS
Consult    Patient: Cuate Sahikh MRN: [id number]  SSN: [id number]    YOB: 1950  Age: 76 y.o. Sex: female      Subjective:      Cuate Shaikh is a 76 y.o. female who is being seen for asked to evaluate and treat recurrent infection right heel. .    Past Medical History:   Diagnosis Date    Acute paraplegia ([facility] 4/20/2017    Benign hypertensive heart disease with systolic CHF, NYHA class 2 ([facility] 9/5/2012    Biventricular implantable cardioverter-defibrillator in situ 04/28/2005    Upgraded to BiV AICD; gen change 4/2008; pocket revision 10/2009; Abdominal - done on 8/22/2012 by Dr. Daisy Hensley Cardiac cath 08/15/1996    Patent coronaries. Elev LVEDP. EF 50-55%.  Cardiac echocardiogram 06/23/2015    Ltd study. EF 45-50%. Mild, diffuse hypk. Severe apical hypk. No mass or thrombus was clearly identified, although imaging was suboptimal.      Cardiac nuclear imaging test 06/19/2015    Fixed distal apical, distal septal defect more likely due to RV pacing than prior infarct. No ischemia. EF 46%. RWMA c/w RV pacing. Nondiagnostic EKG on pharm stress test.      Cardiovascular lower extremity venous duplex 09/04/2012    Acute, non-occlusive DVT in CFV on right. No DVT on left. No superficial thrombosis bilaterally.  Cardiovascular upper extremity venous duplex 08/27/2012    DVT in axillary vein on left. Left subclavian was not visualized.     Chronic anemia 9/5/2012    Chronic systolic heart failure (HCC)     Decreased calculated glomerular filtration rate (GFR) 3/30/2017    Calculated GFR equivalent to that of CKD stage 3 = 30-59 ml/min    Diabetic neuropathy associated with type 2 diabetes mellitus ([facility] 6/28/2011    Difficult airway for intubation 08/22/2012    see anesthesia airway note    Dyslipidemia 6/28/2011    Gout     History of complete heart block 6/28/2011    History of Coumadin therapy     Anticoagulation for DVT of the LUE; Discontinued on 3/30/2017    History of deep venous thrombosis 9/5/2012    Left upper extremity    History of pyelonephritis 3/30/2017    Left bundle branch block (LBBB) on electrocardiogram 6/28/2011    [facility]ische[facility] 6/28/2011    Obesity (BMI 35.0-39.9 without comorbidity) 3/13/2017    Obstructive sleep apnea on CPAP 2/7/2012    Psoas abscess, right ([facility] [facility] 4/20/2017    Psoas hematoma, right, secondary to anticoagulant therapy 3/30/2017    Type 2 diabetes mellitus with diabetic neuropathy ([facility] 6/28/2011     Past Surgical History:   Procedure Laterality Date    HX CARPAL TUNNEL RELEASE  4/07    right     HX CHOLECYSTECTOMY  1994    HX HYSTERECTOMY  1973    HX OTHER SURGICAL  6/11/2012    AICD revision    HX PACEMAKER  4/28/2005    [employer] AICD      Family History   Problem Relation Age of Onset    Cancer Father         Leukemia     Social History     Tobacco Use    Smoking status: Never Smoker    Smokeless tobacco: Never Used   Substance Use Topics    Alcohol use: No      Current Facility-Administered Medications   Medication Dose Route Frequency Provider Last Rate Last Dose    diphenhydrAMINE (BENADRYL) injection 12.5 mg  12.5 mg IntraVENous Q8H PRN Cathie Álvarez MD        sodium hypochlorite (QUARTER STRENGTH DAKIN'S) 0.125% irrigation (bottle)   Topical BID Shital Head, DPLNODON        carvedilol (COREG) tablet 6.25 mg  6.25 mg Oral BID Cathie Álvarez MD   6.25 mg at 12/28/18 0849    famotidine (PEPCID) tablet 20 mg  20 mg Oral QHS Cathie Álvarez MD   20 mg at 56/05/27 2839    folic acid (FOLVITE) tablet 1 mg  1 mg Oral DAILY Cathie Álvarez MD   1 mg at 12/28/18 0849    furosemide (LASIX) tablet 40 mg  40 mg Oral DAILY Cathie Álvarez MD   40 mg at 12/28/18 0849    gabapentin (NEURONTIN) capsule 600 mg  600 mg Oral BID Cathie Álvarez MD   600 mg at 12/28/18 0849    insulin glargine (LANTUS) injection 10 Units  10 Units SubCUTAneous QHS Puma Hanley MD [place]   10 Units at 12/27/18 2207    insulin lispro (HUMALOG) injection 5 Units  0.05 Units/kg SubCUTAneous TID WITH MEALS Amy Hernandez MD   Stopped at 12/27/18 1800    enoxaparin (LOVENOX) injection 40 mg  40 mg SubCUTAneous Q24H Amy Hernandez MD   40 mg at 12/27/18 1900    meropenem (MERREM) 1 g in sterile water (preservative free) 20 mL IV syringe  1 g IntraVENous Q8H Amy Hernandez MD   1 g at 12/28/18 0849    acetaminophen (TYLENOL) tablet 650 mg  650 mg Oral Q4H PRN Amy Hernandez MD   650 mg at 12/28/18 3734        Allergies   Allergen Reactions    Vancomycin Itching    Ampicillin Itching    Bactrim [Sulfamethoxazole-Trimethoprim] Unknown (comments)    Blueberry Swelling     Causes throat swelling    Ciprofloxacin Itching    Codeine Other (comments)     Jumpy feeling    Crestor [Rosuvastatin] Itching    Darvocet A500 [Propoxyphene N-Acetaminophen] Itching    Demerol [Meperidine] Itching    Levaquin [Levofloxacin] Itching    Lipitor [Atorvastatin] Myalgia    Magnesium Oxide Itching     nausea    Minocin [Minocycline] Unknown (comments)    Pcn [Penicillins] Itching    Pravachol [Pravastatin] Swelling     Swelling in mouth. Not allergic per patient, takes at home    Shellfish Derived Unknown (comments)    Sulfa (Sulfonamide Antibiotics) Itching    Ultracet [Tramadol-Acetaminophen] Itching    Vicodin [Hydrocodone-Acetaminophen] Unknown (comments)    Vytorin 10-10 [Ezetimibe-Simvastatin] Myalgia    Percodan [Oxycodone Hcl-Oxycodone-Asa] Itching       Review of Systems:  A comprehensive review of systems was negative except for that written in the History of Present Illness.     Objective:     Vitals:    12/27/18 1918 12/27/18 2245 12/28/18 0705 12/28/18 1151   BP: 122/66 111/60 111/72 129/69   Pulse: 85 85 75 77   Resp: 18 20 20 19   Temp: 97.1 °F (36.2 °C) 97.4 °F (36.3 °C) 97 °F (36.1 °C) 97.2 °F (36.2 °C)   SpO2: 96% 99% 98% 99%   Weight:       Height: Physical Exam:  Seen at bedside awake and alert. Poor granular tissue,exposed  Bone right heel. Pain and smell. Swelling  X-ray's shows diffuse osteopenia. Possible osteitis. Assessment:     Hospital Problems  Date Reviewed: 12/28/2018          Codes Class Noted POA    Infected wound ICD-10-CM: T14. 8XXA, L08.9  ICD-9-CM: 958.3  12/27/2018 Unknown              Plan:     Advise wound care and IV antibiotics at this time.     Signed By: Laurence Cheung DPM     December 28, 2018

## 2018-12-28 NOTE — PROGRESS NOTES
Hospitalist Progress NotePatient: Antoine Angulo MRN: [id number]  CSN: [id number] YOB: 1950  Age: 76 y.o. Sex: female DOA: 12/27/2018 LOS:  LOS: 1 day Assessment/Plan Active Problems: 
  Infected wound (12/27/2018) Plan -  
 
1. Infected R heel ulcer - R/o Osteo - Per podiatry plan to take pt to OR today - continue IV Abx 2. [address] - s/p ICD 3. T2DM - last A1c 8.7 - insulin SS  
4. H/o ESBL 5. H/o urinary incontinence - has chronic perez catheter in place 6. H/o CVA DVT Px - Heparin FC Subjective:  
 
CC: No new events overnight , pt admitted for infected R heel ulcer Objective:  
  
Visit Vitals /69 (BP 1 Location: Right arm, BP Patient Position: At rest) Pulse 77 Temp 97.2 °F (36.2 °C) Resp 19 Ht 5' 7.5\" (1.715 m) Wt 109.3 kg (241 lb) SpO2 99% BMI 37.19 kg/m² Physical Exam: 
 
Gen: In general, this is a well nourished female in no acute distress HEENT: Sclerae nonicteric. Oral mucous membranes moist. Dentition normal 
Neck: Supple with midline trachea. CV: RRR without murmur or rub appreciated. Resp:Respirations are unlabored without use of accessory muscles. Lung fields bilaterally without wheezes or rhonchi. Abd: Soft, nontender, nondistended. Extrem: Extremities are warm, without cyanosis or clubbing. No pitting pretibial edema. - infected R heel ulcer Skin: Warm, no visible rashes. Neuro: Patient is alert, oriented, and cooperative. No obvious focal defects. Moves all 4 extremities. Intake and Output: 
Current Shift:  12/28 0701 - 12/28 1900 In: -  
Out: 5864 [[facility]:[id number]] Last three shifts:  12/26 1901 - 12/28 0700 In: 320 [I.V.:320] Out: 2950 [Urine:2950] Recent Results (from the past 24 hour(s)) CULTURE, BLOOD Collection Time: 12/27/18  2:45 PM  
Result Value Ref Range Special Requests: NO SPECIAL REQUESTS  Culture result: NO GROWTH AFTER 16 HOURS    
 CULTURE, BLOOD Collection Time: 12/27/18  3:00 PM  
Result Value Ref Range Special Requests: NO SPECIAL REQUESTS Culture result: NO GROWTH AFTER 16 HOURS    
GLUCOSE, POC Collection Time: 12/27/18 10:03 PM  
Result Value Ref Range Glucose (POC) 124 (H) 70 - 110 mg/dL METABOLIC PANEL, BASIC Collection Time: 12/28/18  4:30 AM  
Result Value Ref Range Sodium 142 136 - 145 mmol/L Potassium 3.2 (L) 3.5 - 5.5 mmol/L Chloride 109 (H) 100 - 108 mmol/L  
 CO2 28 21 - 32 mmol/L Anion gap 5 3.0 - 18 mmol/L Glucose 123 (H) 74 - 99 mg/dL BUN 15 7.0 - 18 MG/DL Creatinine 1.24 0.6 - 1.3 MG/DL  
 BUN/Creatinine ratio 12 12 - 20 GFR est AA 52 (L) >60 ml/min/1.73m2 GFR est non-AA 43 (L) >60 ml/min/1.73m2 Calcium 8.7 8.5 - 10.1 MG/DL  
CBC WITH AUTOMATED DIFF Collection Time: 12/28/18  4:30 AM  
Result Value Ref Range WBC 5.6 4.6 - 13.2 K/uL  
 RBC 3.22 (L) 4.20 - 5.30 M/uL HGB 8.6 (L) 12.0 - 16.0 g/dL HCT 26.8 (L) 35.0 - 45.0 % MCV 83.2 74.0 - 97.0 FL  
 MCH 26.7 24.0 - 34.0 PG  
 MCHC 32.1 31.0 - 37.0 g/dL  
 RDW 17.6 (H) 11.6 - 14.5 % PLATELET 168 702 - 844 K/uL MPV 8.9 (L) 9.2 - 11.8 FL  
 NEUTROPHILS 63 40 - 73 % LYMPHOCYTES 23 21 - 52 % MONOCYTES 10 3 - 10 % EOSINOPHILS 4 0 - 5 % BASOPHILS 0 0 - 2 %  
 ABS. NEUTROPHILS 3.5 1.8 - 8.0 K/UL  
 ABS. LYMPHOCYTES 1.3 0.9 - 3.6 K/UL  
 ABS. MONOCYTES 0.5 0.05 - 1.2 K/UL  
 ABS. EOSINOPHILS 0.2 0.0 - 0.4 K/UL  
 ABS. BASOPHILS 0.0 0.0 - 0.1 K/UL  
 DF AUTOMATED PROTHROMBIN TIME + INR Collection Time: 12/28/18  4:30 AM  
Result Value Ref Range Prothrombin time 13.2 11.5 - 15.2 sec INR 1.0 0.8 - 1.2 GLUCOSE, POC Collection Time: 12/28/18  6:18 AM  
Result Value Ref Range Glucose (POC) 127 (H) 70 - 110 mg/dL EKG, 12 LEAD, INITIAL Collection Time: 12/28/18  9:13 AM  
Result Value Ref Range Ventricular Rate 75 BPM  
 Atrial Rate 75 BPM  
 P-R Interval 138 ms QRS Duration 198 ms Q-T Interval 488 ms QTC Calculation (Bezet) 544 ms Calculated P Axis 45 degrees Calculated R Axis 70 degrees Calculated T Axis 36 degrees Diagnosis Electronic ventricular pacemaker When compared with ECG of 04-SEP-2018 11:06, 
Vent. rate has increased BY   6 BPM 
  
 
 
Current Meds - Reviewed Lab Results Component Value Date/Time  Glucose 123 (H) 12/28/2018 04:30 AM  
 Glucose 117 (H) 12/27/2018 01:26 PM  
 Glucose 110 (H) 12/21/2018 08:25 PM  
 Glucose 79 12/13/2018 01:57 PM  
 Glucose 98 11/06/2018 01:30 PM  
  
 
 
 
Veronica Mendez MD 
12/28/2018, 2:30 PM

## 2018-12-29 LAB
GLUCOSE BLD STRIP.AUTO-MCNC: 102 MG/DL (ref 70–110)
GLUCOSE BLD STRIP.AUTO-MCNC: 106 MG/DL (ref 70–110)
GLUCOSE BLD STRIP.AUTO-MCNC: 122 MG/DL (ref 70–110)
GLUCOSE BLD STRIP.AUTO-MCNC: 173 MG/DL (ref 70–110)
GLUCOSE BLD STRIP.AUTO-MCNC: 187 MG/DL (ref 70–110)

## 2018-12-29 PROCEDURE — 74011250637 HC RX REV CODE- 250/637: Performed by: HOSPITALIST

## 2018-12-29 PROCEDURE — 65270000029 HC RM PRIVATE

## 2018-12-29 PROCEDURE — 74011636637 HC RX REV CODE- 636/637: Performed by: HOSPITALIST

## 2018-12-29 PROCEDURE — 74011000250 HC RX REV CODE- 250: Performed by: PODIATRIST

## 2018-12-29 PROCEDURE — 82962 GLUCOSE BLOOD TEST: CPT

## 2018-12-29 PROCEDURE — 74011000250 HC RX REV CODE- 250: Performed by: HOSPITALIST

## 2018-12-29 PROCEDURE — 74011250636 HC RX REV CODE- 250/636: Performed by: HOSPITALIST

## 2018-12-29 RX ORDER — CALCIUM CARBONATE 200(500)MG
200 TABLET,CHEWABLE ORAL
Status: DISCONTINUED | OUTPATIENT
Start: 2018-12-29 | End: 2019-01-04

## 2018-12-29 RX ADMIN — FOLIC ACID 1 MG: 1 TABLET ORAL at 09:50

## 2018-12-29 RX ADMIN — INSULIN LISPRO 5 UNITS: 100 INJECTION, SOLUTION INTRAVENOUS; SUBCUTANEOUS at 09:49

## 2018-12-29 RX ADMIN — ACETAMINOPHEN 650 MG: 325 TABLET ORAL at 01:01

## 2018-12-29 RX ADMIN — ACETAMINOPHEN 650 MG: 325 TABLET ORAL at 10:17

## 2018-12-29 RX ADMIN — CALCIUM CARBONATE (ANTACID) CHEW TAB 500 MG 200 MG: 500 CHEW TAB at 14:14

## 2018-12-29 RX ADMIN — FUROSEMIDE 40 MG: 40 TABLET ORAL at 09:50

## 2018-12-29 RX ADMIN — FAMOTIDINE 20 MG: 20 TABLET ORAL at 21:41

## 2018-12-29 RX ADMIN — ACETAMINOPHEN 650 MG: 325 TABLET ORAL at 20:31

## 2018-12-29 RX ADMIN — MEROPENEM 1 G: 1 INJECTION, POWDER, FOR SOLUTION INTRAVENOUS at 17:26

## 2018-12-29 RX ADMIN — MEROPENEM 1 G: 1 INJECTION, POWDER, FOR SOLUTION INTRAVENOUS at 23:57

## 2018-12-29 RX ADMIN — MEROPENEM 1 G: 1 INJECTION, POWDER, FOR SOLUTION INTRAVENOUS at 09:50

## 2018-12-29 RX ADMIN — CALCIUM CARBONATE (ANTACID) CHEW TAB 500 MG 200 MG: 500 CHEW TAB at 17:26

## 2018-12-29 RX ADMIN — MEROPENEM 1 G: 1 INJECTION, POWDER, FOR SOLUTION INTRAVENOUS at 00:54

## 2018-12-29 RX ADMIN — INSULIN GLARGINE 10 UNITS: 100 INJECTION, SOLUTION SUBCUTANEOUS at 21:41

## 2018-12-29 RX ADMIN — ENOXAPARIN SODIUM 40 MG: 100 INJECTION SUBCUTANEOUS at 17:27

## 2018-12-29 RX ADMIN — ACETAMINOPHEN 650 MG: 325 TABLET ORAL at 23:57

## 2018-12-29 RX ADMIN — DAKIN'S SOLUTION 0.125% (QUARTER STRENGTH): 0.12 SOLUTION at 17:28

## 2018-12-29 RX ADMIN — CARVEDILOL 6.25 MG: 6.25 TABLET, FILM COATED ORAL at 09:50

## 2018-12-29 RX ADMIN — GABAPENTIN 600 MG: 300 CAPSULE ORAL at 17:26

## 2018-12-29 RX ADMIN — CARVEDILOL 6.25 MG: 6.25 TABLET, FILM COATED ORAL at 17:26

## 2018-12-29 RX ADMIN — GABAPENTIN 600 MG: 300 CAPSULE ORAL at 09:50

## 2018-12-29 NOTE — ROUTINE PROCESS
Patient is alert and oriented times three with no sign sor symptoms of distress. Dressing is intact and pulses palpable

## 2018-12-29 NOTE — PROGRESS NOTES
Hospitalist Progress NotePatient: Amanda Garcia MRN: [id number]  CSN: [id number] YOB: 1950  Age: 76 y.o. Sex: female DOA: 12/27/2018 LOS:  LOS: 2 days Assessment/Plan Active Problems: 
  Infected wound (12/27/2018) Plan -  
 
1. Infected R heel ulcer - R/o Osteo - Per podiatry consult note from yesterday - no OR at this time - IV Abx & wound care 2. [address] - s/p ICD 3. T2DM - last A1c 8.7 - insulin SS  
4. H/o ESBL 5. H/o urinary incontinence - has chronic perez catheter in place 6. H/o CVA DVT Px - Heparin FC Subjective:  
 
CC: No new events overnight , pt admitted for infected R heel ulcer - says she is doing well on the Abx - swelling has improved Objective:  
  
Visit Vitals /72 (BP 1 Location: Right arm, BP Patient Position: At rest) Pulse 76 Temp 97.5 °F (36.4 °C) Resp 17 Ht 5' 7\" (1.702 m) Wt 108.1 kg (238 lb 6.4 oz) SpO2 100% BMI 37.34 kg/m² Physical Exam: 
 
Gen: In general, this is a well nourished female in no acute distress HEENT: Sclerae nonicteric. Oral mucous membranes moist. Dentition normal 
Neck: Supple with midline trachea. CV: RRR without murmur or rub appreciated. Resp:Respirations are unlabored without use of accessory muscles. Lung fields bilaterally without wheezes or rhonchi. Abd: Soft, nontender, nondistended. Extrem: Extremities are warm, without cyanosis or clubbing. No pitting pretibial edema. - infected R heel ulcer Skin: Warm, no visible rashes. Neuro: Patient is alert, oriented, and cooperative. No obvious focal defects. Moves all 4 extremities. Intake and Output: 
Current Shift:  12/29 0701 - 12/29 1900 In: 480 [P.O.:480] Out: 675 [address] Last three shifts:  12/27 1901 - 12/29 0700 In: 2300 [P.O.:1920; I.V.:380] Out: [address] [[facility]:2818] Recent Results (from the past 24 hour(s)) GLUCOSE, POC  Collection Time: 12/28/18 10:00 PM  
 Result Value Ref Range Glucose (POC) 132 (H) 70 - 110 mg/dL GLUCOSE, POC Collection Time: 12/29/18  6:18 AM  
Result Value Ref Range Glucose (POC) 122 (H) 70 - 110 mg/dL GLUCOSE, POC Collection Time: 12/29/18  9:49 AM  
Result Value Ref Range Glucose (POC) 173 (H) 70 - 110 mg/dL GLUCOSE, POC Collection Time: 12/29/18 11:56 AM  
Result Value Ref Range Glucose (POC) 102 70 - 110 mg/dL GLUCOSE, POC Collection Time: 12/29/18  4:29 PM  
Result Value Ref Range Glucose (POC) 106 70 - 110 mg/dL Current Meds - Reviewed Lab Results Component Value Date/Time  Glucose 123 (H) 12/28/2018 04:30 AM  
 Glucose 117 (H) 12/27/2018 01:26 PM  
 Glucose 110 (H) 12/21/2018 08:25 PM  
 Glucose 79 12/13/2018 01:57 PM  
 Glucose 98 11/06/2018 01:30 PM  
  
 
 
 
Rush Phillips MD 
12/29/2018, 2:30 PM

## 2018-12-29 NOTE — ROUTINE PROCESS
Patient is alert an doriented times three with no signs or symptoms of distress. Dressing on for is intact and clean. Pulses palpable

## 2018-12-29 NOTE — PROGRESS NOTES
Bedside and Verbal shift change report given to Julian Jaime RN (oncoming nurse) by Sylvain Feng RN (offgoing nurse). Report included the following information SBAR, Kardex, Procedure Summary, Intake/Output, MAR and Recent Results.

## 2018-12-29 NOTE — ROUTINE PROCESS
Patient is alert and oriented times three with no signs or symptoms of distress. Dressing on the foot is clean dry and intact and pulses palpable

## 2018-12-30 LAB
GLUCOSE BLD STRIP.AUTO-MCNC: 103 MG/DL (ref 70–110)
GLUCOSE BLD STRIP.AUTO-MCNC: 118 MG/DL (ref 70–110)
GLUCOSE BLD STRIP.AUTO-MCNC: 130 MG/DL (ref 70–110)
GLUCOSE BLD STRIP.AUTO-MCNC: 136 MG/DL (ref 70–110)
GLUCOSE BLD STRIP.AUTO-MCNC: 172 MG/DL (ref 70–110)
GLUCOSE BLD STRIP.AUTO-MCNC: 66 MG/DL (ref 70–110)

## 2018-12-30 PROCEDURE — 82962 GLUCOSE BLOOD TEST: CPT

## 2018-12-30 PROCEDURE — 65270000029 HC RM PRIVATE

## 2018-12-30 PROCEDURE — 74011636637 HC RX REV CODE- 636/637: Performed by: HOSPITALIST

## 2018-12-30 PROCEDURE — 74011250636 HC RX REV CODE- 250/636: Performed by: HOSPITALIST

## 2018-12-30 PROCEDURE — 74011000250 HC RX REV CODE- 250: Performed by: HOSPITALIST

## 2018-12-30 PROCEDURE — 74011250637 HC RX REV CODE- 250/637: Performed by: HOSPITALIST

## 2018-12-30 RX ORDER — BACLOFEN 10 MG/1
10 TABLET ORAL 3 TIMES DAILY
Status: DISCONTINUED | OUTPATIENT
Start: 2018-12-30 | End: 2019-01-02

## 2018-12-30 RX ADMIN — FUROSEMIDE 40 MG: 40 TABLET ORAL at 09:30

## 2018-12-30 RX ADMIN — INSULIN LISPRO 5 UNITS: 100 INJECTION, SOLUTION INTRAVENOUS; SUBCUTANEOUS at 09:33

## 2018-12-30 RX ADMIN — MEROPENEM 1 G: 1 INJECTION, POWDER, FOR SOLUTION INTRAVENOUS at 09:30

## 2018-12-30 RX ADMIN — INSULIN GLARGINE 10 UNITS: 100 INJECTION, SOLUTION SUBCUTANEOUS at 21:42

## 2018-12-30 RX ADMIN — FOLIC ACID 1 MG: 1 TABLET ORAL at 09:31

## 2018-12-30 RX ADMIN — FAMOTIDINE 20 MG: 20 TABLET ORAL at 21:41

## 2018-12-30 RX ADMIN — ACETAMINOPHEN 650 MG: 325 TABLET ORAL at 04:01

## 2018-12-30 RX ADMIN — DAKIN'S SOLUTION 0.125% (QUARTER STRENGTH): 0.12 SOLUTION at 17:35

## 2018-12-30 RX ADMIN — GABAPENTIN 600 MG: 300 CAPSULE ORAL at 09:30

## 2018-12-30 RX ADMIN — ACETAMINOPHEN 650 MG: 325 TABLET ORAL at 16:36

## 2018-12-30 RX ADMIN — BACLOFEN 10 MG: 10 TABLET ORAL at 17:28

## 2018-12-30 RX ADMIN — BACLOFEN 10 MG: 10 TABLET ORAL at 21:41

## 2018-12-30 RX ADMIN — CARVEDILOL 6.25 MG: 6.25 TABLET, FILM COATED ORAL at 09:30

## 2018-12-30 RX ADMIN — ACETAMINOPHEN 650 MG: 325 TABLET ORAL at 09:31

## 2018-12-30 RX ADMIN — MEROPENEM 1 G: 1 INJECTION, POWDER, FOR SOLUTION INTRAVENOUS at 16:34

## 2018-12-30 RX ADMIN — CALCIUM CARBONATE (ANTACID) CHEW TAB 500 MG 200 MG: 500 CHEW TAB at 16:34

## 2018-12-30 RX ADMIN — GABAPENTIN 600 MG: 300 CAPSULE ORAL at 17:28

## 2018-12-30 RX ADMIN — ENOXAPARIN SODIUM 40 MG: 100 INJECTION SUBCUTANEOUS at 17:28

## 2018-12-30 RX ADMIN — CALCIUM CARBONATE (ANTACID) CHEW TAB 500 MG 200 MG: 500 CHEW TAB at 12:13

## 2018-12-30 RX ADMIN — CALCIUM CARBONATE (ANTACID) CHEW TAB 500 MG 200 MG: 500 CHEW TAB at 09:29

## 2018-12-30 RX ADMIN — CARVEDILOL 6.25 MG: 6.25 TABLET, FILM COATED ORAL at 17:28

## 2018-12-30 NOTE — ROUTINE PROCESS
Patient is alert and oriented times three with no signs or symptoms of distress. Up on the bedside commode. Foot dressing is clean and intact and pulses palpable. Swelling is much better today in feet.

## 2018-12-30 NOTE — PROGRESS NOTES
Hospitalist Progress NotePatient: Britt Severs MRN: [id number]  CSN: [id number] YOB: 1950  Age: 76 y.o. Sex: female DOA: 12/27/2018 LOS:  LOS: 3 days Assessment/Plan Active Problems: 
  Infected wound (12/27/2018) Plan -  
 
1. Infected R heel ulcer - Continue IV Abx - Per Podiatry note - Minor Debridement & irrigation with application of A cell donor graft in AM  
2. [address] - s/p ICD 3. T2DM - last A1c 8.7 - insulin SS  
4. H/o ESBL 5. H/o urinary incontinence - has chronic perez catheter in place 6. H/o CVA DVT Px - Heparin FC Subjective:  
 
CC: No new events overnight , pt is c/o Left shoulder pain - says likely because of the way she slept Objective:  
  
Visit Vitals /68 (BP 1 Location: Right arm, BP Patient Position: At rest;During activity) Pulse 71 Temp 97.5 °F (36.4 °C) Resp 20 Ht 5' 7\" (1.702 m) Wt 105.5 kg (232 lb 8 oz) SpO2 97% BMI 36.41 kg/m² Physical Exam: 
 
Gen: In general, this is a well nourished female in no acute distress HEENT: Sclerae nonicteric. Oral mucous membranes moist. Dentition normal 
Neck: Supple with midline trachea. CV: RRR without murmur or rub appreciated. Resp:Respirations are unlabored without use of accessory muscles. Lung fields bilaterally without wheezes or rhonchi. Abd: Soft, nontender, nondistended. Extrem: Extremities are warm, without cyanosis or clubbing. No pitting pretibial edema. - infected R heel ulcer Skin: Warm, no visible rashes. Neuro: Patient is alert, oriented, and cooperative. No obvious focal defects. Moves all 4 extremities. Intake and Output: 
Current Shift:  12/30 0701 - 12/30 1900 In: 480 [P.O.:480] Out: 1300 [Urine:1300] Last three shifts:  12/28 1901 - 12/30 0700 In: 5 [P.O.:4700; I.V.:120] Out: 2526 [Urine:2525] Recent Results (from the past 24 hour(s)) GLUCOSE, POC  Collection Time: 12/29/18  4:29 PM  
 Result Value Ref Range Glucose (POC) 106 70 - 110 mg/dL GLUCOSE, POC Collection Time: 12/29/18  9:28 PM  
Result Value Ref Range Glucose (POC) 187 (H) 70 - 110 mg/dL GLUCOSE, POC Collection Time: 12/30/18  6:00 AM  
Result Value Ref Range Glucose (POC) 136 (H) 70 - 110 mg/dL GLUCOSE, POC Collection Time: 12/30/18  9:33 AM  
Result Value Ref Range Glucose (POC) 172 (H) 70 - 110 mg/dL GLUCOSE, POC Collection Time: 12/30/18 12:15 PM  
Result Value Ref Range Glucose (POC) 66 (L) 70 - 110 mg/dL GLUCOSE, POC Collection Time: 12/30/18 12:46 PM  
Result Value Ref Range Glucose (POC) 103 70 - 110 mg/dL Current Meds - Reviewed Lab Results Component Value Date/Time  Glucose 123 (H) 12/28/2018 04:30 AM  
 Glucose 117 (H) 12/27/2018 01:26 PM  
 Glucose 110 (H) 12/21/2018 08:25 PM  
 Glucose 79 12/13/2018 01:57 PM  
 Glucose 98 11/06/2018 01:30 PM  
  
 
 
 
Eze Schreiber MD 
12/30/2018, 2:30 PM

## 2018-12-30 NOTE — ROUTINE PROCESS
Patient is alert and oriented times three with no signs or symptoms of distress. Dressing is intact and pulses palpable

## 2018-12-30 NOTE — PROGRESS NOTES
Bedside and Verbal shift change report given to Arie Mora RN (oncoming nurse) by Hodan Trammell (offgoing nurse). Report included the following information SBAR, Kardex, Procedure Summary, Intake/Output, MAR and Recent Results.

## 2018-12-30 NOTE — PROGRESS NOTES
Problem: Pressure Injury - Risk of 
Goal: *Prevention of pressure injury Document Surjit Scale and appropriate interventions in the flowsheet. Outcome: Progressing Towards Goal 
Pressure Injury Interventions: 
Sensory Interventions: Assess changes in LOC, Assess need for specialty bed, Avoid rigorous massage over bony prominences, Check visual cues for pain, Discuss PT/OT consult with provider, Float heels, Keep linens dry and wrinkle-free, Monitor skin under medical devices, Pad between skin to skin Moisture Interventions: Absorbent underpads, Assess need for specialty bed, Check for incontinence Q2 hours and as needed, Internal/External urinary devices Activity Interventions: Assess need for specialty bed, Increase time out of bed, Pressure redistribution bed/mattress(bed type), PT/OT evaluation Mobility Interventions: Assess need for specialty bed, HOB 30 degrees or less, Pressure redistribution bed/mattress (bed type), PT/OT evaluation Nutrition Interventions: Document food/fluid/supplement intake, Offer support with meals,snacks and hydration Friction and Shear Interventions: Apply protective barrier, creams and emollients, Foam dressings/transparent film/skin sealants, HOB 30 degrees or less, Lift sheet, Lift team/patient mobility team, Minimize layers, Sit at 90-degree angle

## 2018-12-30 NOTE — PROGRESS NOTES
Seen at bedside awake and alert. Feels improved. Marked reduced swelling and inflammation right foot and lower leg. Wound has improved. No drainage. Improved color of wound edges and increased granular tissue. Plan for minor  Debridement and irrigation with application of A Cell donor graft in AM.

## 2018-12-30 NOTE — PROGRESS NOTES
Bedside and Verbal shift change report given to DANIEL Huertas (oncoming nurse) by Geannie Alpers, RN (offgoing nurse). Report included the following information SBAR, Kardex, Procedure Summary, Intake/Output, MAR and Recent Results.

## 2018-12-31 ENCOUNTER — ANESTHESIA (OUTPATIENT)
Dept: SURGERY | Age: 68
DRG: 629 | End: 2018-12-31
Payer: COMMERCIAL

## 2018-12-31 ENCOUNTER — ANESTHESIA EVENT (OUTPATIENT)
Dept: SURGERY | Age: 68
DRG: 629 | End: 2018-12-31
Payer: COMMERCIAL

## 2018-12-31 LAB
ERYTHROCYTE [DISTWIDTH] IN BLOOD BY AUTOMATED COUNT: 17.3 % (ref 11.6–14.5)
GLUCOSE BLD STRIP.AUTO-MCNC: 112 MG/DL (ref 70–110)
GLUCOSE BLD STRIP.AUTO-MCNC: 126 MG/DL (ref 70–110)
GLUCOSE BLD STRIP.AUTO-MCNC: 129 MG/DL (ref 70–110)
GLUCOSE BLD STRIP.AUTO-MCNC: 138 MG/DL (ref 70–110)
GLUCOSE BLD STRIP.AUTO-MCNC: 168 MG/DL (ref 70–110)
GLUCOSE BLD STRIP.AUTO-MCNC: 98 MG/DL (ref 70–110)
HCT VFR BLD AUTO: 30.3 % (ref 35–45)
HGB BLD-MCNC: 9.8 G/DL (ref 12–16)
MCH RBC QN AUTO: 27.5 PG (ref 24–34)
MCHC RBC AUTO-ENTMCNC: 32.3 G/DL (ref 31–37)
MCV RBC AUTO: 84.9 FL (ref 74–97)
PLATELET # BLD AUTO: 202 K/UL (ref 135–420)
PMV BLD AUTO: 9.2 FL (ref 9.2–11.8)
RBC # BLD AUTO: 3.57 M/UL (ref 4.2–5.3)
WBC # BLD AUTO: 3.4 K/UL (ref 4.6–13.2)

## 2018-12-31 PROCEDURE — 74011250636 HC RX REV CODE- 250/636: Performed by: HOSPITALIST

## 2018-12-31 PROCEDURE — 74011250636 HC RX REV CODE- 250/636

## 2018-12-31 PROCEDURE — 76060000032 HC ANESTHESIA 0.5 TO 1 HR: Performed by: PODIATRIST

## 2018-12-31 PROCEDURE — 87070 CULTURE OTHR SPECIMN AEROBIC: CPT

## 2018-12-31 PROCEDURE — 77030018836 HC SOL IRR NACL ICUM -A: Performed by: PODIATRIST

## 2018-12-31 PROCEDURE — 77030032490 HC SLV COMPR SCD KNE COVD -B: Performed by: PODIATRIST

## 2018-12-31 PROCEDURE — 65270000029 HC RM PRIVATE

## 2018-12-31 PROCEDURE — 74011250636 HC RX REV CODE- 250/636: Performed by: PODIATRIST

## 2018-12-31 PROCEDURE — 82962 GLUCOSE BLOOD TEST: CPT

## 2018-12-31 PROCEDURE — 74011250636 HC RX REV CODE- 250/636: Performed by: ANESTHESIOLOGY

## 2018-12-31 PROCEDURE — 88304 TISSUE EXAM BY PATHOLOGIST: CPT

## 2018-12-31 PROCEDURE — 74011636637 HC RX REV CODE- 636/637: Performed by: HOSPITALIST

## 2018-12-31 PROCEDURE — 74011250637 HC RX REV CODE- 250/637: Performed by: HOSPITALIST

## 2018-12-31 PROCEDURE — 36415 COLL VENOUS BLD VENIPUNCTURE: CPT

## 2018-12-31 PROCEDURE — 0QBL0ZZ EXCISION OF RIGHT TARSAL, OPEN APPROACH: ICD-10-PCS | Performed by: PODIATRIST

## 2018-12-31 PROCEDURE — 0HRMXK3 REPLACEMENT OF RIGHT FOOT SKIN WITH NONAUTOLOGOUS TISSUE SUBSTITUTE, FULL THICKNESS, EXTERNAL APPROACH: ICD-10-PCS | Performed by: PODIATRIST

## 2018-12-31 PROCEDURE — 85027 COMPLETE CBC AUTOMATED: CPT

## 2018-12-31 PROCEDURE — 77030020782 HC GWN BAIR PAWS FLX 3M -B: Performed by: PODIATRIST

## 2018-12-31 PROCEDURE — 76210000006 HC OR PH I REC 0.5 TO 1 HR: Performed by: PODIATRIST

## 2018-12-31 PROCEDURE — 74011250637 HC RX REV CODE- 250/637: Performed by: ANESTHESIOLOGY

## 2018-12-31 PROCEDURE — 76010000138 HC OR TIME 0.5 TO 1 HR: Performed by: PODIATRIST

## 2018-12-31 PROCEDURE — 74011000272 HC RX REV CODE- 272: Performed by: PODIATRIST

## 2018-12-31 PROCEDURE — 88311 DECALCIFY TISSUE: CPT

## 2018-12-31 PROCEDURE — 74011000250 HC RX REV CODE- 250: Performed by: HOSPITALIST

## 2018-12-31 PROCEDURE — 87075 CULTR BACTERIA EXCEPT BLOOD: CPT

## 2018-12-31 PROCEDURE — 74011000250 HC RX REV CODE- 250: Performed by: PODIATRIST

## 2018-12-31 DEVICE — DRESSING WND 3 LAYR 7X10 CM MTRX CYTAL: Type: IMPLANTABLE DEVICE | Site: HEEL | Status: FUNCTIONAL

## 2018-12-31 RX ORDER — LIDOCAINE HYDROCHLORIDE 20 MG/ML
INJECTION, SOLUTION EPIDURAL; INFILTRATION; INTRACAUDAL; PERINEURAL AS NEEDED
Status: DISCONTINUED | OUTPATIENT
Start: 2018-12-31 | End: 2018-12-31 | Stop reason: HOSPADM

## 2018-12-31 RX ORDER — MIDAZOLAM HYDROCHLORIDE 1 MG/ML
INJECTION, SOLUTION INTRAMUSCULAR; INTRAVENOUS AS NEEDED
Status: DISCONTINUED | OUTPATIENT
Start: 2018-12-31 | End: 2018-12-31 | Stop reason: HOSPADM

## 2018-12-31 RX ORDER — PROPOFOL 10 MG/ML
INJECTION, EMULSION INTRAVENOUS AS NEEDED
Status: DISCONTINUED | OUTPATIENT
Start: 2018-12-31 | End: 2018-12-31 | Stop reason: HOSPADM

## 2018-12-31 RX ORDER — FENTANYL CITRATE 50 UG/ML
50 INJECTION, SOLUTION INTRAMUSCULAR; INTRAVENOUS
Status: DISCONTINUED | OUTPATIENT
Start: 2018-12-31 | End: 2018-12-31

## 2018-12-31 RX ORDER — FAMOTIDINE 20 MG/1
20 TABLET, FILM COATED ORAL ONCE
Status: COMPLETED | OUTPATIENT
Start: 2018-12-31 | End: 2018-12-31

## 2018-12-31 RX ORDER — SODIUM CHLORIDE, SODIUM LACTATE, POTASSIUM CHLORIDE, CALCIUM CHLORIDE 600; 310; 30; 20 MG/100ML; MG/100ML; MG/100ML; MG/100ML
125 INJECTION, SOLUTION INTRAVENOUS CONTINUOUS
Status: DISCONTINUED | OUTPATIENT
Start: 2018-12-31 | End: 2018-12-31 | Stop reason: HOSPADM

## 2018-12-31 RX ORDER — ONDANSETRON 2 MG/ML
4 INJECTION INTRAMUSCULAR; INTRAVENOUS ONCE
Status: DISCONTINUED | OUTPATIENT
Start: 2018-12-31 | End: 2018-12-31

## 2018-12-31 RX ORDER — FAMOTIDINE 20 MG/1
TABLET, FILM COATED ORAL
Status: DISCONTINUED
Start: 2018-12-31 | End: 2018-12-31

## 2018-12-31 RX ORDER — FENTANYL CITRATE 50 UG/ML
INJECTION, SOLUTION INTRAMUSCULAR; INTRAVENOUS AS NEEDED
Status: DISCONTINUED | OUTPATIENT
Start: 2018-12-31 | End: 2018-12-31 | Stop reason: HOSPADM

## 2018-12-31 RX ORDER — PROPOFOL 10 MG/ML
INJECTION, EMULSION INTRAVENOUS
Status: DISCONTINUED | OUTPATIENT
Start: 2018-12-31 | End: 2018-12-31 | Stop reason: HOSPADM

## 2018-12-31 RX ADMIN — GABAPENTIN 600 MG: 300 CAPSULE ORAL at 18:01

## 2018-12-31 RX ADMIN — PROPOFOL 30 MG: 10 INJECTION, EMULSION INTRAVENOUS at 12:14

## 2018-12-31 RX ADMIN — BACLOFEN 10 MG: 10 TABLET ORAL at 21:45

## 2018-12-31 RX ADMIN — MEROPENEM 1 G: 1 INJECTION, POWDER, FOR SOLUTION INTRAVENOUS at 23:46

## 2018-12-31 RX ADMIN — CARVEDILOL 6.25 MG: 6.25 TABLET, FILM COATED ORAL at 18:01

## 2018-12-31 RX ADMIN — LIDOCAINE HYDROCHLORIDE 50 MG: 20 INJECTION, SOLUTION EPIDURAL; INFILTRATION; INTRACAUDAL; PERINEURAL at 12:13

## 2018-12-31 RX ADMIN — FUROSEMIDE 40 MG: 40 TABLET ORAL at 08:29

## 2018-12-31 RX ADMIN — FENTANYL CITRATE 25 MCG: 50 INJECTION, SOLUTION INTRAMUSCULAR; INTRAVENOUS at 12:21

## 2018-12-31 RX ADMIN — MEROPENEM 1 G: 1 INJECTION, POWDER, FOR SOLUTION INTRAVENOUS at 17:01

## 2018-12-31 RX ADMIN — SODIUM CHLORIDE, SODIUM LACTATE, POTASSIUM CHLORIDE, AND CALCIUM CHLORIDE 125 ML/HR: 600; 310; 30; 20 INJECTION, SOLUTION INTRAVENOUS at 10:47

## 2018-12-31 RX ADMIN — PROPOFOL 25 MCG/KG/MIN: 10 INJECTION, EMULSION INTRAVENOUS at 12:15

## 2018-12-31 RX ADMIN — FENTANYL CITRATE 25 MCG: 50 INJECTION, SOLUTION INTRAMUSCULAR; INTRAVENOUS at 12:32

## 2018-12-31 RX ADMIN — DIPHENHYDRAMINE HYDROCHLORIDE 12.5 MG: 50 INJECTION, SOLUTION INTRAMUSCULAR; INTRAVENOUS at 23:46

## 2018-12-31 RX ADMIN — ACETAMINOPHEN 650 MG: 325 TABLET ORAL at 21:48

## 2018-12-31 RX ADMIN — CARVEDILOL 6.25 MG: 6.25 TABLET, FILM COATED ORAL at 08:27

## 2018-12-31 RX ADMIN — ACETAMINOPHEN 650 MG: 325 TABLET ORAL at 00:48

## 2018-12-31 RX ADMIN — FOLIC ACID 1 MG: 1 TABLET ORAL at 08:28

## 2018-12-31 RX ADMIN — MEROPENEM 1 G: 1 INJECTION, POWDER, FOR SOLUTION INTRAVENOUS at 00:49

## 2018-12-31 RX ADMIN — BACLOFEN 10 MG: 10 TABLET ORAL at 16:59

## 2018-12-31 RX ADMIN — ACETAMINOPHEN 650 MG: 325 TABLET ORAL at 04:53

## 2018-12-31 RX ADMIN — BACLOFEN 10 MG: 10 TABLET ORAL at 08:27

## 2018-12-31 RX ADMIN — FAMOTIDINE 20 MG: 20 TABLET ORAL at 21:45

## 2018-12-31 RX ADMIN — MEROPENEM 1 G: 1 INJECTION, POWDER, FOR SOLUTION INTRAVENOUS at 08:31

## 2018-12-31 RX ADMIN — GABAPENTIN 600 MG: 300 CAPSULE ORAL at 08:30

## 2018-12-31 RX ADMIN — FENTANYL CITRATE 50 MCG: 50 INJECTION, SOLUTION INTRAMUSCULAR; INTRAVENOUS at 12:05

## 2018-12-31 RX ADMIN — ENOXAPARIN SODIUM 40 MG: 100 INJECTION SUBCUTANEOUS at 18:03

## 2018-12-31 RX ADMIN — MIDAZOLAM HYDROCHLORIDE 1 MG: 1 INJECTION, SOLUTION INTRAMUSCULAR; INTRAVENOUS at 12:04

## 2018-12-31 RX ADMIN — INSULIN GLARGINE 10 UNITS: 100 INJECTION, SOLUTION SUBCUTANEOUS at 21:44

## 2018-12-31 RX ADMIN — MIDAZOLAM HYDROCHLORIDE 1 MG: 1 INJECTION, SOLUTION INTRAMUSCULAR; INTRAVENOUS at 12:09

## 2018-12-31 RX ADMIN — FAMOTIDINE 20 MG: 20 TABLET ORAL at 10:48

## 2018-12-31 RX ADMIN — CALCIUM CARBONATE (ANTACID) CHEW TAB 500 MG 200 MG: 500 CHEW TAB at 17:00

## 2018-12-31 NOTE — ROUTINE PROCESS
Patient is alert and oriented times three with no signs or symptoms of distress. Dressing is intact and dry and pulses palpable

## 2018-12-31 NOTE — BRIEF OP NOTE
BRIEF OPERATIVE NOTE Date of Procedure: 12/31/2018 Preoperative Diagnosis: recurrent infection right heel Postoperative Diagnosis: right heel infection Procedure(s): DEBRIDEMENT RIGHT HEEL/ APPLICATION OF A CELL Surgeon(s) and Role: Klever Canseco DPM - Primary Surgical Assistant: Salazar Pelayo 
 
Surgical Staff: 
Circ-1: Patricia Funes Scrub Tech-1: Roxana Ulloa Surg Asst-1: Mery Eastman Event Time In Time Out Incision Start 12/31/2018 1224 Incision Close 12/31/2018 1238 Anesthesia: MAC Estimated Blood Loss: 0 Specimens:  
ID Type Source Tests Collected by Time Destination 1 : Right Calcaneous Preservative Heel  [place], [place] 12/31/2018 1226 Pathology 1 : Right Calcaneous for cultures Wound Heel CULTURE, ANAEROBIC, CULTURE, WOUND W Nahomy Melchor [place], [place] 12/31/2018 1236 Microbiology Findings: wound Complications: none Implants:  
Implant Name Type Inv. Item Serial No.  Lot No. LRB No. Used Action GRAFT WND 3-LAYER 7X10CM -- CYTAL - [id number]  GRAFT WND 3-LAYER 7X10CM -- CYTAL  [employer] [id number] Right 1 Implanted

## 2018-12-31 NOTE — PERIOP NOTES
TRANSFER - OUT REPORT: 
 
Verbal report given to [facility] tomás Dougherty  being transferred to [phone] for routine post - op Report consisted of patients Situation, Background, Assessment and  
Recommendations(SBAR). Information from the following report(s) SBAR, OR Summary and Recent Results was reviewed with the receiving nurse. Lines:  
Peripheral IV 12/27/18 Left Antecubital (Active) Site Assessment Clean, dry, & intact 12/31/2018 12:49 PM  
Phlebitis Assessment 0 12/31/2018 12:49 PM  
Infiltration Assessment 0 12/31/2018 12:49 PM  
Dressing Status Clean, dry, & intact 12/31/2018 12:49 PM  
Dressing Type Tape;Transparent 12/31/2018 12:49 PM  
Hub Color/Line Status Pink 12/31/2018 12:49 PM  
Action Taken Open ports on tubing capped 12/31/2018  4:19 AM  
Alcohol Cap Used Yes 12/31/2018  7:30 AM  
  
 
Opportunity for questions and clarification was provided. Patient transported with: 
 [employer]

## 2018-12-31 NOTE — PROGRESS NOTES
Hospitalist Progress Note Patient: Patricia Hikcs Age: 76 y.o. : 1950 MR#: [id number] SSN: [id number] Date/Time: 2018 4:59 PM 
 
Subjective: She was admitted on 18 for right heel ulcer infection despite on wound vac with care. Outpt workup was suspicious for osteomyelitis. She was started on IV antibiotic with history of ESBL organisms. She underwent debridement of right heel with bone biopsy/culture, application of ACell graft (18). She tolerated well. ROS: no fever/chills, no headache, no dizziness, No swallowing pain, No chest pain, no palpitation, no shortness of breath, no abd pain, No diarrhea, no urinary complaint, no leg pain or swelling Assessment/Plan: 1. Chronic Right heel ulcer with infection, ?osteomyelitis -s/p debridement of right heel and bone biopsy 
   - on meropenem daily. F/u culture  
   -wound care per Podiatry 2. H/o ESBL in right foot ulcer 3. DMT2, on sliding scale insulin 4. Non-ischemic cardiomyopathy, EF 50% (2017), s/p BiV AICD 
    - continue her home dose of carvedilol and lasix. No sign of decompensation 5. HTN, on carvedilol and lasix 6. HALI, ? CPAP 7. Chronic normocytic anemia, check her iron profile, ferritin, vitb12, folate 8. Chronic urinary catheter, stable. 9.  H/o CVA 10.  Paraplegia with wheelchair dependent. Stable, fall precaution Full Code Additional Notes:   
 
Case discussed with:  [x]Patient  []Family  []Nursing  []Case Management DVT Prophylaxis:  [x]Lovenox  []Hep SQ  []SCDs  []Coumadin   []On Heparin gtt Signed By: Fabiola Wilkins MD   
 2018 4:59 PM  
  
 
 
Objective:  
VS:  
Visit Vitals /73 Pulse 75 Temp 97.9 °F (36.6 °C) Resp 13 Ht 5' 7\" (1.702 m) Wt 105.5 kg (232 lb 8 oz) LMP  (LMP Unknown) SpO2 99% BMI 36.41 kg/m² Tmax/24hrs: Temp (24hrs), Av.3 °F (36.3 °C), Min:96.9 °F (36.1 °C), Max:97.9 °F (36.6 °C) Intake/Output Summary (Last 24 hours) at 12/31/2018 1659 Last data filed at 12/31/2018 1239 Gross per 24 hour Intake 1270 ml Output 3002 ml Net -1732 ml Tele: sinus tachycardia General:  Cooperative, No acute distress, speaks in full sentence while in bed HEENT: PERRL, EOMI, supple neck, no JVD, dry oral mucosa Cardiovascular: S1S2 regular, no rub/gallop Pulmonary: Air entry bilaterally, no wheezing, ++ crackle bilaterally GI:  Soft, non tender, non distended, +bs, no guarding Extremities:  trace pedal edema, +distal pulses appreciated Neuro: AOx3, moving all upper extremities, lower extremities deficit. SKIN: right heel dressed, dry. Additional:  
Current Facility-Administered Medications Medication Dose Route Frequency  famotidine (PEPCID) 20 mg tablet  baclofen (LIORESAL) tablet 10 mg  10 mg Oral TID  calcium carbonate (TUMS) chewable tablet 200 mg [elemental]  200 mg Oral TID WITH MEALS  diphenhydrAMINE (BENADRYL) injection 12.5 mg  12.5 mg IntraVENous Q8H PRN  
 sodium hypochlorite (QUARTER STRENGTH DAKIN'S) 0.125% irrigation (bottle)   Topical BID  carvedilol (COREG) tablet 6.25 mg  6.25 mg Oral BID  famotidine (PEPCID) tablet 20 mg  20 mg Oral QHS  folic acid (FOLVITE) tablet 1 mg  1 mg Oral DAILY  furosemide (LASIX) tablet 40 mg  40 mg Oral DAILY  gabapentin (NEURONTIN) capsule 600 mg  600 mg Oral BID  insulin glargine (LANTUS) injection 10 Units  10 Units SubCUTAneous QHS  insulin lispro (HUMALOG) injection 5 Units  0.05 Units/kg SubCUTAneous TID WITH MEALS  enoxaparin (LOVENOX) injection 40 mg  40 mg SubCUTAneous Q24H  
 meropenem (MERREM) 1 g in sterile water (preservative free) 20 mL IV syringe  1 g IntraVENous Q8H  
 acetaminophen (TYLENOL) tablet 650 mg  650 mg Oral Q4H PRN Labs:   
Recent Results (from the past 24 hour(s)) GLUCOSE, POC Collection Time: 12/30/18  9:46 PM  
Result Value Ref Range Glucose (POC) 118 (H) 70 - 110 mg/dL CBC W/O DIFF Collection Time: 12/31/18  4:52 AM  
Result Value Ref Range WBC 3.4 (L) 4.6 - 13.2 K/uL  
 RBC 3.57 (L) 4.20 - 5.30 M/uL HGB 9.8 (L) 12.0 - 16.0 g/dL HCT 30.3 (L) 35.0 - 45.0 % MCV 84.9 74.0 - 97.0 FL  
 MCH 27.5 24.0 - 34.0 PG  
 MCHC 32.3 31.0 - 37.0 g/dL  
 RDW 17.3 (H) 11.6 - 14.5 % PLATELET 113 968 - 881 K/uL MPV 9.2 9.2 - 11.8 FL  
GLUCOSE, POC Collection Time: 12/31/18  6:19 AM  
Result Value Ref Range Glucose (POC) 112 (H) 70 - 110 mg/dL GLUCOSE, POC Collection Time: 12/31/18  8:24 AM  
Result Value Ref Range Glucose (POC) 129 (H) 70 - 110 mg/dL GLUCOSE, POC Collection Time: 12/31/18 10:40 AM  
Result Value Ref Range Glucose (POC) 126 (H) 70 - 110 mg/dL CULTURE, TISSUE W GRAM STAIN Collection Time: 12/31/18 12:26 PM  
Result Value Ref Range Special Requests: RIGHT CALCANEOUS   
 GRAM STAIN FEW WBC'S    
 GRAM STAIN NO ORGANISMS SEEN Culture result: PENDING   
GLUCOSE, POC Collection Time: 12/31/18  1:05 PM  
Result Value Ref Range Glucose (POC) 98 70 - 110 mg/dL GLUCOSE, POC Collection Time: 12/31/18  4:28 PM  
Result Value Ref Range Glucose (POC) 138 (H) 70 - 110 mg/dL Xray of right foot: 12/27/18 IMPRESSION:   
  
1. Soft tissue defect in the posterior heel region. 2.  Foreshortened calcaneus with sharply marginated posterior calcaneal region. Query recent surgical intervention. Possible subtle cortical defect at the 
inferior margin.   Developing infectious process cannot be excluded.

## 2018-12-31 NOTE — ANESTHESIA POSTPROCEDURE EVALUATION
Procedure(s): DEBRIDEMENT RIGHT HEEL/ APPLICATION OF A CELL. Anesthesia Post Evaluation Multimodal analgesia: multimodal analgesia used between 6 hours prior to anesthesia start to PACU discharge Patient location during evaluation: bedside Patient participation: complete - patient participated Level of consciousness: awake Pain management: adequate Airway patency: patent Anesthetic complications: no 
Cardiovascular status: stable Respiratory status: acceptable Hydration status: acceptable Post anesthesia nausea and vomiting:  controlled Visit Vitals /73 Pulse 75 Temp 36.6 °C (97.9 °F) Resp 13 Ht 5' 7\" (1.702 m) Wt 105.5 kg (232 lb 8 oz) SpO2 99% BMI 36.41 kg/m²

## 2018-12-31 NOTE — OP NOTES
[address] [place] [address]   OPERATIVE REPORT    Name:Chelsea MARS  MR#: [id number]  : 1950  ACCOUNT #: [de-identified]   DATE OF SERVICE: 2018    PREOPERATIVE DIAGNOSIS:  Open wound with possible osteomyelitis, delayed healing right heel. POSTOPERATIVE DIAGNOSIS:  Open wound with possible osteomyelitis, delayed healing right heel. PROCEDURES PERFORMED:  Debridement of right heel bone biopsy and culture, application of ACell graft, all right heel. SURGEON:  Kelly Schmidt DPM    ASSISTANT:  -.     ANESTHESIA:  MAC.    ESTIMATED BLOOD LOSS:   -. SPECIMENS REMOVED:  -.     COMPLICATIONS:  -. IMPLANTS:  --. PROCEDURE:  On 2018, the patient was placed on the operating table in supine position. After adequate induction of MAC anesthesia, the right lower extremity was prepped and draped in usual sterile fashion. Attention was directed to the right heel. The wound was debrided with a curette of any devitalized tissue, mostly granular tissue. Next, using a curette and a rongeur, a deeper biopsy of the bone was accomplished for pathology and culture. Wound was thoroughly irrigated with antibiotic solution and a compressive dressing was applied to maintain his hemostasis. Once this was resolved, ACell graft was applied, stapled in place with Adaptic stapled in place and ointment. A compressive dressing had been applied. Patient tolerated the procedure and anesthesia well, vital signs stable throughout. She was transported to the recovery room in stable condition.       MOOKIE Mcdaniel / CAITLIN  D: 2018 13:00     T: 2018 14:04  JOB #: [id number]  CC: Ofe Yang DPM

## 2018-12-31 NOTE — PROGRESS NOTES
Problem: Pressure Injury - Risk of 
Goal: *Prevention of pressure injury Document Surjit Scale and appropriate interventions in the flowsheet. Outcome: Progressing Towards Goal 
Pressure Injury Interventions: 
Sensory Interventions: Assess need for specialty bed, Float heels, Maintain/enhance activity level, Keep linens dry and wrinkle-free, Minimize linen layers, Monitor skin under medical devices, Suspension boots Moisture Interventions: Absorbent underpads, Assess need for specialty bed, Check for incontinence Q2 hours and as needed, Internal/External urinary devices Activity Interventions: Assess need for specialty bed, Increase time out of bed, Pressure redistribution bed/mattress(bed type) Mobility Interventions: Float heels, Assess need for specialty bed, Suspension boots Nutrition Interventions: Document food/fluid/supplement intake Friction and Shear Interventions: Feet elevated on foot rest, HOB 30 degrees or less, Lift sheet

## 2018-12-31 NOTE — H&P
H&P Update: 
Gladystine Patience was seen and examined. History and physical has been reviewed. The patient has been examined.  There have been no significant clinical changes since the completion of the originally dated History and Physical. 
 
Signed By: Bridgett Norton DPM   
 December 31, 2018 11:49 AM

## 2018-12-31 NOTE — ANESTHESIA PREPROCEDURE EVALUATION
Anesthetic History Increased risk of difficult airway Comments: H/o difficult airway (pt is not aware) Review of Systems / Medical History Patient summary reviewed and pertinent labs reviewed Pulmonary Sleep apnea Neuro/Psych Within defined limits Comments: PARAPLEGIA S/P TRAUMA/ INFECTION 2017 Cardiovascular Dysrhythmias Pacemaker Exercise tolerance: <4 METS Comments: AICD 2005 removed, new PPM/ ICD? In abdominal wall (2012) complete heart block GI/Hepatic/Renal 
Within defined limits Endo/Other Diabetes: type 2 Morbid obesity Other Findings Physical Exam 
 
Airway Mallampati: II 
TM Distance: 4 - 6 cm Neck ROM: normal range of motion Mouth opening: Normal 
 
 Cardiovascular Regular rate and rhythm,  S1 and S2 normal,  no murmur, click, rub, or gallop Rhythm: regular Rate: normal 
 
 
 
 Dental 
 
Dentition: Poor dentition Comments: The patient has very poor dentition. Loose, broken, and or missing teeth. I explained the risk of dental damage and or loss. The patient understands and accepts these risks. Pulmonary Breath sounds clear to auscultation Abdominal 
GI exam deferred Other Findings Anesthetic Plan ASA: 3 Anesthesia type: MAC and general - backup Induction: Intravenous Anesthetic plan and risks discussed with: Patient

## 2018-12-31 NOTE — PROGRESS NOTES
Bedside and Verbal shift change report given to DANIEL yoo (oncoming nurse) by Jody Perez RN (offgoing nurse). Report included the following information SBAR, Kardex, Procedure Summary, Intake/Output, MAR and Recent Results.

## 2018-12-31 NOTE — PROGRESS NOTES
Bedside and Verbal shift change report given to Hershal Litten, RN (oncoming nurse) by Vanessa Henry RN (offgoing nurse). Report included the following information SBAR, Kardex, Procedure Summary, Intake/Output, MAR and Recent Results.

## 2018-12-31 NOTE — ROUTINE PROCESS
Patient is NPO for surgery sine 1200 midnight no signs or symptoms of distress. Dressing is intact and pulses palpable

## 2019-01-01 LAB
ANION GAP SERPL CALC-SCNC: 4 MMOL/L (ref 3–18)
BASOPHILS # BLD: 0 K/UL (ref 0–0.1)
BASOPHILS NFR BLD: 0 % (ref 0–2)
BNP SERPL-MCNC: 2700 PG/ML (ref 0–900)
BUN SERPL-MCNC: 17 MG/DL (ref 7–18)
BUN/CREAT SERPL: 12 (ref 12–20)
CALCIUM SERPL-MCNC: 9.1 MG/DL (ref 8.5–10.1)
CHLORIDE SERPL-SCNC: 106 MMOL/L (ref 100–108)
CO2 SERPL-SCNC: 31 MMOL/L (ref 21–32)
CREAT SERPL-MCNC: 1.39 MG/DL (ref 0.6–1.3)
DIFFERENTIAL METHOD BLD: ABNORMAL
EOSINOPHIL # BLD: 0.3 K/UL (ref 0–0.4)
EOSINOPHIL NFR BLD: 5 % (ref 0–5)
ERYTHROCYTE [DISTWIDTH] IN BLOOD BY AUTOMATED COUNT: 17.4 % (ref 11.6–14.5)
FERRITIN SERPL-MCNC: 681 NG/ML (ref 8–388)
FOLATE SERPL-MCNC: >20 NG/ML (ref 3.1–17.5)
GLUCOSE BLD STRIP.AUTO-MCNC: 109 MG/DL (ref 70–110)
GLUCOSE BLD STRIP.AUTO-MCNC: 119 MG/DL (ref 70–110)
GLUCOSE BLD STRIP.AUTO-MCNC: 135 MG/DL (ref 70–110)
GLUCOSE BLD STRIP.AUTO-MCNC: 144 MG/DL (ref 70–110)
GLUCOSE SERPL-MCNC: 97 MG/DL (ref 74–99)
HCT VFR BLD AUTO: 30.2 % (ref 35–45)
HGB BLD-MCNC: 9.8 G/DL (ref 12–16)
IRON SATN MFR SERPL: 18 %
IRON SERPL-MCNC: 36 UG/DL (ref 50–175)
LYMPHOCYTES # BLD: 1.4 K/UL (ref 0.9–3.6)
LYMPHOCYTES NFR BLD: 27 % (ref 21–52)
MAGNESIUM SERPL-MCNC: 2.3 MG/DL (ref 1.6–2.6)
MCH RBC QN AUTO: 27.2 PG (ref 24–34)
MCHC RBC AUTO-ENTMCNC: 32.5 G/DL (ref 31–37)
MCV RBC AUTO: 83.9 FL (ref 74–97)
MONOCYTES # BLD: 0.4 K/UL (ref 0.05–1.2)
MONOCYTES NFR BLD: 8 % (ref 3–10)
NEUTS SEG # BLD: 3.1 K/UL (ref 1.8–8)
NEUTS SEG NFR BLD: 60 % (ref 40–73)
PLATELET # BLD AUTO: 197 K/UL (ref 135–420)
PMV BLD AUTO: 9.3 FL (ref 9.2–11.8)
POTASSIUM SERPL-SCNC: 3.8 MMOL/L (ref 3.5–5.5)
RBC # BLD AUTO: 3.6 M/UL (ref 4.2–5.3)
SODIUM SERPL-SCNC: 141 MMOL/L (ref 136–145)
TIBC SERPL-MCNC: 195 UG/DL (ref 250–450)
VIT B12 SERPL-MCNC: 876 PG/ML (ref 211–911)
WBC # BLD AUTO: 5.1 K/UL (ref 4.6–13.2)

## 2019-01-01 PROCEDURE — 80048 BASIC METABOLIC PNL TOTAL CA: CPT

## 2019-01-01 PROCEDURE — 82962 GLUCOSE BLOOD TEST: CPT

## 2019-01-01 PROCEDURE — 74011250636 HC RX REV CODE- 250/636: Performed by: HOSPITALIST

## 2019-01-01 PROCEDURE — 74011250637 HC RX REV CODE- 250/637: Performed by: HOSPITALIST

## 2019-01-01 PROCEDURE — 82607 VITAMIN B-12: CPT

## 2019-01-01 PROCEDURE — 83540 ASSAY OF IRON: CPT

## 2019-01-01 PROCEDURE — 83735 ASSAY OF MAGNESIUM: CPT

## 2019-01-01 PROCEDURE — 82728 ASSAY OF FERRITIN: CPT

## 2019-01-01 PROCEDURE — 74011250637 HC RX REV CODE- 250/637: Performed by: INTERNAL MEDICINE

## 2019-01-01 PROCEDURE — 85025 COMPLETE CBC W/AUTO DIFF WBC: CPT

## 2019-01-01 PROCEDURE — 74011250636 HC RX REV CODE- 250/636: Performed by: INTERNAL MEDICINE

## 2019-01-01 PROCEDURE — 36415 COLL VENOUS BLD VENIPUNCTURE: CPT

## 2019-01-01 PROCEDURE — 74011000250 HC RX REV CODE- 250: Performed by: HOSPITALIST

## 2019-01-01 PROCEDURE — 65270000029 HC RM PRIVATE

## 2019-01-01 PROCEDURE — 74011636637 HC RX REV CODE- 636/637: Performed by: HOSPITALIST

## 2019-01-01 PROCEDURE — 83880 ASSAY OF NATRIURETIC PEPTIDE: CPT

## 2019-01-01 RX ORDER — LORATADINE 10 MG/1
10 TABLET ORAL DAILY
Status: DISCONTINUED | OUTPATIENT
Start: 2019-01-01 | End: 2019-01-08 | Stop reason: HOSPADM

## 2019-01-01 RX ORDER — ACETAMINOPHEN 325 MG/1
650 TABLET ORAL 3 TIMES DAILY
Status: DISCONTINUED | OUTPATIENT
Start: 2019-01-01 | End: 2019-01-08 | Stop reason: HOSPADM

## 2019-01-01 RX ORDER — FLUTICASONE PROPIONATE 50 MCG
2 SPRAY, SUSPENSION (ML) NASAL DAILY
Status: DISCONTINUED | OUTPATIENT
Start: 2019-01-02 | End: 2019-01-08 | Stop reason: HOSPADM

## 2019-01-01 RX ORDER — ONDANSETRON 2 MG/ML
4 INJECTION INTRAMUSCULAR; INTRAVENOUS
Status: DISCONTINUED | OUTPATIENT
Start: 2019-01-01 | End: 2019-01-02

## 2019-01-01 RX ADMIN — GABAPENTIN 600 MG: 300 CAPSULE ORAL at 08:29

## 2019-01-01 RX ADMIN — FAMOTIDINE 20 MG: 20 TABLET ORAL at 22:00

## 2019-01-01 RX ADMIN — ENOXAPARIN SODIUM 40 MG: 100 INJECTION SUBCUTANEOUS at 17:56

## 2019-01-01 RX ADMIN — GABAPENTIN 600 MG: 300 CAPSULE ORAL at 17:55

## 2019-01-01 RX ADMIN — MEROPENEM 1 G: 1 INJECTION, POWDER, FOR SOLUTION INTRAVENOUS at 08:29

## 2019-01-01 RX ADMIN — CARVEDILOL 6.25 MG: 6.25 TABLET, FILM COATED ORAL at 17:55

## 2019-01-01 RX ADMIN — INSULIN GLARGINE 10 UNITS: 100 INJECTION, SOLUTION SUBCUTANEOUS at 22:46

## 2019-01-01 RX ADMIN — BACLOFEN 10 MG: 10 TABLET ORAL at 22:00

## 2019-01-01 RX ADMIN — BACLOFEN 10 MG: 10 TABLET ORAL at 15:28

## 2019-01-01 RX ADMIN — ONDANSETRON 4 MG: 2 INJECTION INTRAMUSCULAR; INTRAVENOUS at 12:33

## 2019-01-01 RX ADMIN — LORATADINE 10 MG: 10 TABLET ORAL at 15:28

## 2019-01-01 RX ADMIN — BACLOFEN 10 MG: 10 TABLET ORAL at 08:30

## 2019-01-01 RX ADMIN — FOLIC ACID 1 MG: 1 TABLET ORAL at 08:30

## 2019-01-01 RX ADMIN — FUROSEMIDE 40 MG: 40 TABLET ORAL at 08:30

## 2019-01-01 RX ADMIN — CALCIUM CARBONATE (ANTACID) CHEW TAB 500 MG 200 MG: 500 CHEW TAB at 08:30

## 2019-01-01 RX ADMIN — ACETAMINOPHEN 650 MG: 325 TABLET ORAL at 20:21

## 2019-01-01 RX ADMIN — MEROPENEM 1 G: 1 INJECTION, POWDER, FOR SOLUTION INTRAVENOUS at 15:28

## 2019-01-01 RX ADMIN — ACETAMINOPHEN 650 MG: 325 TABLET ORAL at 15:29

## 2019-01-01 RX ADMIN — CARVEDILOL 6.25 MG: 6.25 TABLET, FILM COATED ORAL at 08:29

## 2019-01-01 RX ADMIN — CALCIUM CARBONATE (ANTACID) CHEW TAB 500 MG 200 MG: 500 CHEW TAB at 17:55

## 2019-01-01 RX ADMIN — CALCIUM CARBONATE (ANTACID) CHEW TAB 500 MG 200 MG: 500 CHEW TAB at 11:51

## 2019-01-01 NOTE — PROGRESS NOTES
Hospitalist Progress Note Patient: Nidia Mendez Age: 76 y.o. : 1950 MR#: [id number] SSN: [id number] Date/Time: 2019 2:47 PM 
 
Subjective: She was admitted on 18 for right heel ulcer infection despite on wound vac with care. Outpt workup was suspicious for osteomyelitis. She was started on IV antibiotic with history of ESBL organisms. She underwent debridement of right heel with bone biopsy/culture, application of ACell graft (18). She tolerated well. No overnight event. She has nausea this AM with nasal congestion and rhinorrhea. No fever/chill. Feels pain in her heels but tolerated without narcotic. Has been on gabapentin/baclofen Lab review with chronic proBNP elevated. She is on home dose diuretics Creatinine is slightly increased. ROS: no fever/chills, no headache, no dizziness, no facial pain, ++ sinus congestion, No swallowing pain, No chest pain, no palpitation, no shortness of breath, no abd pain, No diarrhea, no urinary complaint, no leg pain or swelling Assessment/Plan: 1. Chronic Right heel ulcer with infection, ?osteomyelitis -s/p debridement of Rt heel and bone biopsy,  
   - on meropenem daily due to past ESBL, will need culture f/u - wound per Podiatry - pt asked for Dr. Helen Mock for consultation if needed 2. H/o ESBL in right foot ulcer 3. DMT2, on sliding scale insulin 4. Non-ischemic cardiomyopathy, EF 50% (2017), s/p BiV AICD 
    -elevated probnp but same as previous, ?baseline 
    - no evidence of decompensation, continues her lasix and carvedilol 5. HTN, on carvedilol and lasix 6. HALI, ? CPAP 7. Chronic normocytic anemia, evidence of low iron, but elevated ferritin,  
    - can give iron supplement after finished antibiotics 8. Chronic urinary catheter, stable. 9.  H/o CVA 10.  Paraplegia with wheelchair dependent. Stable, fall precaution 11. Sinus allergy, given flonase and loratadine Full Code Additional Notes:   
 
Case discussed with:  [x]Patient  []Family  []Nursing  []Case Management DVT Prophylaxis:  [x]Lovenox  []Hep SQ  []SCDs  []Coumadin   []On Heparin gtt Signed By: Shruthi Raya MD   
 2019 2:47 PM  
  
 
 
Objective:  
VS:  
Visit Vitals /87 (BP 1 Location: Right arm, BP Patient Position: At rest) Pulse 74 Temp 97.2 °F (36.2 °C) Resp 18 Ht 5' 7\" (1.702 m) Wt 111.1 kg (245 lb) LMP  (LMP Unknown) SpO2 99% BMI 38.37 kg/m² Tmax/24hrs: Temp (24hrs), Av.6 °F (36.4 °C), Min:97 °F (36.1 °C), Max:98.8 °F (37.1 °C) Intake/Output Summary (Last 24 hours) at 2019 1447 Last data filed at 2019 1243 Gross per 24 hour Intake 1200 ml Output 2050 ml Net -850 ml Tele: sinus tachycardia General:  Cooperative, No acute distress, speaks in full sentence while in bed HEENT: PERRL, EOMI, supple neck, no JVD, dry oral mucosa Cardiovascular: S1S2 regular, no rub/gallop Pulmonary: Air entry bilaterally, no wheezing, ++ crackle bilaterally GI:  Soft, non tender, non distended, +bs, no guarding Extremities:  trace pedal edema, +distal pulses appreciated Neuro: AOx3, moving all upper extremities, lower extremities deficit. SKIN: right heel dressed, dry. Additional:  
Current Facility-Administered Medications Medication Dose Route Frequency  ondansetron (ZOFRAN) injection 4 mg  4 mg IntraVENous Q4H PRN  
 baclofen (LIORESAL) tablet 10 mg  10 mg Oral TID  calcium carbonate (TUMS) chewable tablet 200 mg [elemental]  200 mg Oral TID WITH MEALS  diphenhydrAMINE (BENADRYL) injection 12.5 mg  12.5 mg IntraVENous Q8H PRN  
 sodium hypochlorite (QUARTER STRENGTH DAKIN'S) 0.125% irrigation (bottle)   Topical BID  carvedilol (COREG) tablet 6.25 mg  6.25 mg Oral BID  famotidine (PEPCID) tablet 20 mg  20 mg Oral QHS  folic acid (FOLVITE) tablet 1 mg  1 mg Oral DAILY  furosemide (LASIX) tablet 40 mg  40 mg Oral DAILY  gabapentin (NEURONTIN) capsule 600 mg  600 mg Oral BID  insulin glargine (LANTUS) injection 10 Units  10 Units SubCUTAneous QHS  insulin lispro (HUMALOG) injection 5 Units  0.05 Units/kg SubCUTAneous TID WITH MEALS  enoxaparin (LOVENOX) injection 40 mg  40 mg SubCUTAneous Q24H  
 meropenem (MERREM) 1 g in sterile water (preservative free) 20 mL IV syringe  1 g IntraVENous Q8H  
 acetaminophen (TYLENOL) tablet 650 mg  650 mg Oral Q4H PRN Labs:   
Recent Results (from the past 24 hour(s)) GLUCOSE, POC Collection Time: 12/31/18  4:28 PM  
Result Value Ref Range Glucose (POC) 138 (H) 70 - 110 mg/dL GLUCOSE, POC Collection Time: 12/31/18  8:42 PM  
Result Value Ref Range Glucose (POC) 168 (H) 70 - 110 mg/dL CBC WITH AUTOMATED DIFF Collection Time: 01/01/19  4:41 AM  
Result Value Ref Range WBC 5.1 4.6 - 13.2 K/uL  
 RBC 3.60 (L) 4.20 - 5.30 M/uL HGB 9.8 (L) 12.0 - 16.0 g/dL HCT 30.2 (L) 35.0 - 45.0 % MCV 83.9 74.0 - 97.0 FL  
 MCH 27.2 24.0 - 34.0 PG  
 MCHC 32.5 31.0 - 37.0 g/dL  
 RDW 17.4 (H) 11.6 - 14.5 % PLATELET 227 894 - 409 K/uL MPV 9.3 9.2 - 11.8 FL  
 NEUTROPHILS 60 40 - 73 % LYMPHOCYTES 27 21 - 52 % MONOCYTES 8 3 - 10 % EOSINOPHILS 5 0 - 5 % BASOPHILS 0 0 - 2 %  
 ABS. NEUTROPHILS 3.1 1.8 - 8.0 K/UL  
 ABS. LYMPHOCYTES 1.4 0.9 - 3.6 K/UL  
 ABS. MONOCYTES 0.4 0.05 - 1.2 K/UL  
 ABS. EOSINOPHILS 0.3 0.0 - 0.4 K/UL  
 ABS. BASOPHILS 0.0 0.0 - 0.1 K/UL  
 DF AUTOMATED METABOLIC PANEL, BASIC Collection Time: 01/01/19  4:41 AM  
Result Value Ref Range Sodium 141 136 - 145 mmol/L Potassium 3.8 3.5 - 5.5 mmol/L Chloride 106 100 - 108 mmol/L  
 CO2 31 21 - 32 mmol/L Anion gap 4 3.0 - 18 mmol/L Glucose 97 74 - 99 mg/dL BUN 17 7.0 - 18 MG/DL Creatinine 1.39 (H) 0.6 - 1.3 MG/DL  
 BUN/Creatinine ratio 12 12 - 20 GFR est AA 46 (L) >60 ml/min/1.73m2 GFR est non-AA 38 (L) >60 ml/min/1.73m2 Calcium 9.1 8.5 - 10.1 MG/DL MAGNESIUM Collection Time: 01/01/19  4:41 AM  
Result Value Ref Range Magnesium 2.3 1.6 - 2.6 mg/dL IRON PROFILE Collection Time: 01/01/19  4:41 AM  
Result Value Ref Range Iron 36 (L) 50 - 175 ug/dL TIBC 195 (L) 250 - 450 ug/dL Iron % saturation 18 % VITAMIN B12 & FOLATE Collection Time: 01/01/19  4:41 AM  
Result Value Ref Range Vitamin B12 876 211 - 911 pg/mL Folate >20.0 (H) 3.10 - 17.50 ng/mL FERRITIN Collection Time: 01/01/19  4:41 AM  
Result Value Ref Range Ferritin 681 (H) 8 - 388 NG/ML  
NT-PRO BNP Collection Time: 01/01/19  4:41 AM  
Result Value Ref Range NT pro-BNP 2,700 (H) 0 - 900 PG/ML  
GLUCOSE, POC Collection Time: 01/01/19  5:54 AM  
Result Value Ref Range Glucose (POC) 109 70 - 110 mg/dL GLUCOSE, POC Collection Time: 01/01/19 11:33 AM  
Result Value Ref Range Glucose (POC) 119 (H) 70 - 110 mg/dL Xray of right foot: 12/27/18 IMPRESSION:   
  
1. Soft tissue defect in the posterior heel region. 2.  Foreshortened calcaneus with sharply marginated posterior calcaneal region. Query recent surgical intervention. Possible subtle cortical defect at the 
inferior margin.   Developing infectious process cannot be excluded.

## 2019-01-01 NOTE — ROUTINE PROCESS
Bedside and Verbal shift change report given to Grover Warren RN (oncoming nurse) by Mai Saini RN (offgoing nurse). Report included the following information SBAR, Kardex, MAR and Recent Results. SITUATION:  
? Code Status: Full Code 
? Reason for Admission: Infected wound 
 
? Hospital day: 5 
? Problem List:  
   
Hospital Problems  Date Reviewed: 12/28/2018 Codes Class Noted POA Infected wound ICD-10-CM: T14. 8XXA, L08.9 ICD-9-CM: 958.3  12/27/2018 Unknown BACKGROUND:  
 Past Medical History:  
Past Medical History:  
Diagnosis Date  Acute paraplegia ([facility] [address] 4/20/2017  Benign hypertensive heart disease with systolic CHF, NYHA class 2 ([address] 9/5/2012  Biventricular implantable cardioverter-defibrillator in situ 04/28/2005 Upgraded to BiV AICD; gen change 4/2008; pocket revision 10/2009; Abdominal - done on 8/22/2012 by Dr. Puma Wells  Cardiac cath 08/15/1996 Patent coronaries. Elev LVEDP. EF 50-55%.  Cardiac echocardiogram 06/23/2015 Ltd study. EF 45-50%. Mild, diffuse hypk. Severe apical hypk. No mass or thrombus was clearly identified, although imaging was suboptimal.    
 Cardiac nuclear imaging test 06/19/2015 Fixed distal apical, distal septal defect more likely due to RV pacing than prior infarct. No ischemia. EF 46%. RWMA c/w RV pacing. Nondiagnostic EKG on pharm stress test.    
 Cardiovascular lower extremity venous duplex 09/04/2012 Acute, non-occlusive DVT in CFV on right. No DVT on left. No superficial thrombosis bilaterally.  Cardiovascular upper extremity venous duplex 08/27/2012 DVT in axillary vein on left. Left subclavian was not visualized.  Chronic anemia 9/5/2012  Chronic systolic heart failure ([facility] [address]  Decreased calculated glomerular filtration rate (GFR) 3/30/2017 Calculated GFR equivalent to that of CKD stage 3 = 30-59 ml/min  Diabetic neuropathy associated with type 2 diabetes mellitus ([address] 6/28/2011  Difficult airway for intubation 08/22/2012  
 see anesthesia airway note  Dyslipidemia 6/28/2011  Gout  History of complete heart block 6/28/2011  History of Coumadin therapy Anticoagulation for DVT of the LUE; Discontinued on 3/30/2017  History of deep venous thrombosis 9/5/2012 Left upper extremity  History of pyelonephritis 3/30/2017  Left bundle branch block (LBBB) on electrocardiogram 6/28/2011  Nonischemic cardiomyopathy ([facility] [address] 6/28/2011  Obesity (BMI 35.0-39.9 without comorbidity) 3/13/2017  Obstructive sleep apnea on CPAP 2/7/2012  Psoas abscess, right ([facility] [address] 4/20/2017  Psoas hematoma, right, secondary to anticoagulant therapy 3/30/2017  Type 2 diabetes mellitus with diabetic neuropathy ([facility] [address] 6/28/2011 Patient taking anticoagulants no ASSESSMENT:  
? Changes in Assessment Throughout Shift: none ? Patient has Central Line: no Reasons if yes:  
? Patient has Krueger Cath: yes Reasons if yes: chronic ? Last Vitals: 
  
Vitals:  
 12/31/18 2308 01/01/19 0200 01/01/19 0300 01/01/19 0600 BP: 115/61 109/68  118/73 Pulse: 73 70  70 Resp: 20 17  17 Temp: 97.4 °F (36.3 °C) 97.4 °F (36.3 °C)  97 °F (36.1 °C) SpO2: 92% 96%  97% Weight:   111.1 kg (245 lb) Height:      
 
 
? IV and DRAINS (will only show if present) Peripheral IV 12/27/18 Left Antecubital-Site Assessment: Clean, dry, & intact ? WOUND (if present) Wound Type:  foot Dressing present Wound Concerns/Notes:  none ? PAIN Pain Assessment Pain Intensity 1: 0 (12/31/18 1917) Pain Location 1: Foot Pain Intervention(s) 1: Medication (see MAR) Patient Stated Pain Goal: 0 
o Interventions for Pain:  See mar 
o Intervention effective: yes 
o Time of last intervention: see mar  
o Reassessment Completed: yes ? Last 3 Weights: 
Last 3 Recorded Weights in this Encounter 12/29/18 0601 12/30/18 0802 01/01/19 0300 Weight: 108.1 kg (238 lb 6.4 oz) 105.5 kg (232 lb 8 oz) 111.1 kg (245 lb) Weight change: 5.67 kg (12 lb 8 oz) ? INTAKE/OUPUT Current Shift: 12/31 1901 - 01/01 0700 In: 480 [P.O.:480] Out: - Last three shifts: 12/30 0701 - 12/31 1900 In: 4126 [P.O.:2160; I.V.:310] Out: Pat Castanon [[id number]] ? LAB RESULTS Recent Labs 01/01/19 
0441 12/31/18 
4268 WBC 5.1 3.4* HGB 9.8* 9.8* HCT 30.2* 30.3*  202 Recent Labs 01/01/19 0441   
K 3.8 GLU 97 BUN 17 CREA 1.39* CA 9.1 MG 2.3 RECOMMENDATIONS AND DISCHARGE PLANNING 1. Pending tests/procedures/ Plan of Care or Other Needs: none 2. Discharge plan for patient and Needs/Barriers: Rehab? 3. Estimated Discharge Date: unknown Posted on Whiteboard in Patients Room: no   
 
4. The patient's care plan was reviewed with the oncoming nurse. \"HEALS\" SAFETY CHECK Fall Risk Total Score: 3 Safety Measures: Safety Measures: Bed/Chair-Wheels locked, Bed in low position, Call light within reach, Side rails X 3 A safety check occurred in the patient's room between off going nurse and oncoming nurse listed above. The safety check included the below items Area Items H High Alert Medications ? Verify all high alert medication drips (heparin, PCA, etc.) E Equipment ? Suction is set up for ALL patients (with yanker) ? Red plugs utilized for all equipment (IV pumps, etc.) ? WOWs wiped down at end of shift. ? Room stocked with oxygen, suction, and other unit-specific supplies A Alarms ? Bed alarm is set for fall risk patients ? Ensure chair alarm is in place and activated if patient is up in a chair L Lines ? Check IV for any infiltration ? Krueger bag is empty if patient has a Krueger ? Tubing and IV bags are labeled Natacha Agustin Safety ? Room is clean, patient is clean, and equipment is clean. ? Hallways are clear from equipment besides carts. ? Fall bracelet on for fall risk patients ? Ensure room is clear and free of clutter ? Suction is set up for ALL patients (with vania) ? Hallways are clear from equipment besides carts. ? Isolation precautions followed, supplies available outside room, sign posted Sierra Harris RN

## 2019-01-01 NOTE — ROUTINE PROCESS
0730 Patient denies any unusual numbness or tingling in her right foot and leg. Is able to move her foot. Cap refill <3 seconds. Temp of extremity is warm. 1330 Patient denies any unusual numbness or tingling in her right foot and leg. Is able to move her foot. Cap refill <3 seconds. Temp of extremity is warm. Patient returned from PACU. She is alert and oriented. Denies any pain. VSS. No signs of distress noted. 1700 Patient denies any unusual numbness or tingling in her right foot and leg. Is able to move her foot. Cap refill <3 seconds. Temp of extremity is warm.  
 
1900 Shift report given to Shmuel Vargas

## 2019-01-02 LAB
BACTERIA SPEC CULT: NORMAL
BACTERIA SPEC CULT: NORMAL
GLUCOSE BLD STRIP.AUTO-MCNC: 106 MG/DL (ref 70–110)
GLUCOSE BLD STRIP.AUTO-MCNC: 122 MG/DL (ref 70–110)
GLUCOSE BLD STRIP.AUTO-MCNC: 148 MG/DL (ref 70–110)
GLUCOSE BLD STRIP.AUTO-MCNC: 93 MG/DL (ref 70–110)
SERVICE CMNT-IMP: NORMAL
SERVICE CMNT-IMP: NORMAL

## 2019-01-02 PROCEDURE — 74011250637 HC RX REV CODE- 250/637: Performed by: INTERNAL MEDICINE

## 2019-01-02 PROCEDURE — 74011250636 HC RX REV CODE- 250/636: Performed by: INTERNAL MEDICINE

## 2019-01-02 PROCEDURE — 74011250637 HC RX REV CODE- 250/637: Performed by: HOSPITALIST

## 2019-01-02 PROCEDURE — 74011250636 HC RX REV CODE- 250/636: Performed by: HOSPITALIST

## 2019-01-02 PROCEDURE — 74011636637 HC RX REV CODE- 636/637: Performed by: HOSPITALIST

## 2019-01-02 PROCEDURE — 82962 GLUCOSE BLOOD TEST: CPT

## 2019-01-02 PROCEDURE — 65270000029 HC RM PRIVATE

## 2019-01-02 PROCEDURE — 74011000250 HC RX REV CODE- 250: Performed by: HOSPITALIST

## 2019-01-02 RX ORDER — BACLOFEN 10 MG/1
10 TABLET ORAL
Status: DISCONTINUED | OUTPATIENT
Start: 2019-01-02 | End: 2019-01-03

## 2019-01-02 RX ADMIN — ENOXAPARIN SODIUM 40 MG: 100 INJECTION SUBCUTANEOUS at 18:50

## 2019-01-02 RX ADMIN — ACETAMINOPHEN 650 MG: 325 TABLET ORAL at 08:30

## 2019-01-02 RX ADMIN — CALCIUM CARBONATE (ANTACID) CHEW TAB 500 MG 200 MG: 500 CHEW TAB at 12:27

## 2019-01-02 RX ADMIN — MEROPENEM 1 G: 1 INJECTION, POWDER, FOR SOLUTION INTRAVENOUS at 08:32

## 2019-01-02 RX ADMIN — GABAPENTIN 600 MG: 300 CAPSULE ORAL at 18:48

## 2019-01-02 RX ADMIN — CALCIUM CARBONATE (ANTACID) CHEW TAB 500 MG 200 MG: 500 CHEW TAB at 16:29

## 2019-01-02 RX ADMIN — ONDANSETRON 4 MG: 2 INJECTION INTRAMUSCULAR; INTRAVENOUS at 11:34

## 2019-01-02 RX ADMIN — CARVEDILOL 6.25 MG: 6.25 TABLET, FILM COATED ORAL at 18:44

## 2019-01-02 RX ADMIN — MEROPENEM 1 G: 1 INJECTION, POWDER, FOR SOLUTION INTRAVENOUS at 00:19

## 2019-01-02 RX ADMIN — BACLOFEN 10 MG: 10 TABLET ORAL at 10:13

## 2019-01-02 RX ADMIN — ONDANSETRON 4 MG: 2 INJECTION INTRAMUSCULAR; INTRAVENOUS at 05:25

## 2019-01-02 RX ADMIN — LORATADINE 10 MG: 10 TABLET ORAL at 10:16

## 2019-01-02 RX ADMIN — ACETAMINOPHEN 650 MG: 325 TABLET ORAL at 21:25

## 2019-01-02 RX ADMIN — FAMOTIDINE 20 MG: 20 TABLET ORAL at 21:25

## 2019-01-02 RX ADMIN — FUROSEMIDE 40 MG: 40 TABLET ORAL at 10:15

## 2019-01-02 RX ADMIN — MEROPENEM 1 G: 1 INJECTION, POWDER, FOR SOLUTION INTRAVENOUS at 16:46

## 2019-01-02 RX ADMIN — CALCIUM CARBONATE (ANTACID) CHEW TAB 500 MG 200 MG: 500 CHEW TAB at 08:31

## 2019-01-02 RX ADMIN — ACETAMINOPHEN 650 MG: 325 TABLET ORAL at 14:09

## 2019-01-02 RX ADMIN — CARVEDILOL 6.25 MG: 6.25 TABLET, FILM COATED ORAL at 10:14

## 2019-01-02 RX ADMIN — MEROPENEM 1 G: 1 INJECTION, POWDER, FOR SOLUTION INTRAVENOUS at 23:35

## 2019-01-02 RX ADMIN — INSULIN GLARGINE 10 UNITS: 100 INJECTION, SOLUTION SUBCUTANEOUS at 21:50

## 2019-01-02 RX ADMIN — GABAPENTIN 600 MG: 300 CAPSULE ORAL at 10:16

## 2019-01-02 NOTE — ROUTINE PROCESS
Patient is alert and oriented times three with no signs or symptms of distress. Dressing is clean dry and intact and pulses palpable

## 2019-01-02 NOTE — ROUTINE PROCESS
Patient is alert and oriented times three with no signs or symptoms of distress  Dressing is intact  And pulses palpable. Swelling is going down

## 2019-01-02 NOTE — ROUTINE PROCESS
Patient condition is stable. Drsg. D&I to Rt. Foot. Patient report decreased sensation to foot; diabetic neuropathy. Color and warmth of foot is WNL.

## 2019-01-02 NOTE — DIABETES MGMT
GLYCEMIC CONTROL AND NUTRITION Assessment/Recommendations: 
Blood glucose this am 122 mg/dl Blood glucose within target range. Recommend corrective insulin coverage AC&HS, instead of lispro 5 units 3 times daily with meals. Will continue inpatient monitoring. Most recent blood glucose values: 
Results for Tyrell Mckeon (MRN [id number]) as of 1/2/2019 14:42 Ref. Range 1/1/2019 11:33 1/1/2019 17:12 1/1/2019 22:23 1/2/2019 06:29 1/2/2019 11:18  
GLUCOSE,FAST - POC Latest Ref Range: 70 - 110 mg/dL 119 (H) 144 (H) 135 (H) 122 (H) 148 (H) Current A1C of 8.7 % is equivalent to average blood glucose of 203 mg/dl over the past 2-3 months. Current hospital diabetes medications:  
Lantus 10 units daily Lispro 5 units 3 times daily with meals. Previous day's insulin requirements:  
lantus 10 units Home diabetes medications: 
Takes Lantus 20 units every bedtime, humalog 4 units 3 times daily with meals and metformin 500 mg daily. Diet:   
Diabetic consistent carb Education:  __x_Refer to Diabetes Education Record  
          ____Education not indicated at this time Steven Lee RN CDE Ext [phone]

## 2019-01-02 NOTE — ROUTINE PROCESS
Patient reports decreasing hand strength and duration of performing such task as holding a cup for an extended period of time. Her  are equal and she is holding a cup for me now. Will pass on information to her doctor.

## 2019-01-02 NOTE — ROUTINE PROCESS
0730 Patient denies any numbness or tingling in her right foot. Cap refill <3 seconds. Right foot it warm to touch. Patient denies pain. 1200 Patient denies any numbness or tingling in her right foot. Cap refill <3 seconds. Right foot it warm to touch. Patient denies pain. 1700 Patient denies any numbness or tingling in her right foot. Cap refill <3 seconds. Right foot it warm to touch. Patient denies pain.

## 2019-01-02 NOTE — PROGRESS NOTES
Problem: Pressure Injury - Risk of 
Goal: *Prevention of pressure injury Document Surjit Scale and appropriate interventions in the flowsheet. Outcome: Progressing Towards Goal 
Pressure Injury Interventions: 
Sensory Interventions: Assess need for specialty bed, Float heels, Keep linens dry and wrinkle-free, Maintain/enhance activity level, Turn and reposition approx. every two hours (pillows and wedges if needed), Suspension boots Moisture Interventions: Offer toileting Q_hr, Internal/External urinary devices, Check for incontinence Q2 hours and as needed, Assess need for specialty bed, Absorbent underpads Activity Interventions: Assess need for specialty bed, Increase time out of bed, Pressure redistribution bed/mattress(bed type) Mobility Interventions: Float heels, Pressure redistribution bed/mattress (bed type), Suspension boots Nutrition Interventions: Document food/fluid/supplement intake, Offer support with meals,snacks and hydration Friction and Shear Interventions: Lift sheet

## 2019-01-02 NOTE — PROGRESS NOTES
Hospitalist Progress Note Patient: Linda Gonsalez Age: 76 y.o. : 1950 MR#: [id number] SSN: [id number] Date/Time: 2019 4:27 PM 
 
Subjective: She was admitted on 18 for right heel ulcer infection despite on wound vac with care. Outpt workup was suspicious for osteomyelitis. She was started on IV antibiotic with history of ESBL organisms. She underwent debridement of right heel with bone biopsy/culture, application of ACell graft (18). She tolerated well. Allergies symptom resolved. She takes Baclofen infrequently, has it TID while here. Feels she is sleepy all the time here. Has one loose BM this AM. No shortness of breath, no chest pain. Has nausea but no vomiting. ROS: no fever/chills, no headache, no dizziness,no sinus congestion, No swallowing pain, No chest pain, no palpitation, no shortness of breath, no abd pain, No diarrhea, no urinary complaint, no leg pain or swelling Assessment/Plan: 1. Chronic Right heel ulcer with infection, ?osteomyelitis -s/p debridement of Right heel and bone biopsy 
  -cont meropenem, f/u cultures 
  -wound care per Podiatry 
  -ID consult to follow up  
2. H/o ESBL in right foot ulcer 3. DMT2, on sliding scale insulin 4. Non-ischemic cardiomyopathy, EF 50% (2017), s/p BiV AICD 
   -no shortness of breath, has been maintain on her home dose lasix 
   - no decompensation evidence  
   - elevated ProBNP as previous. Continue lasix and carvedilol 5. HTN, on carvedilol and lasix 6. HALI, ? CPAP 7. Chronic normocytic anemia, evidence of low iron, but elevated ferritin,  
    -she stated that she has sickle cell trait, could contribute to her anemia. - can hold off folate if she does not want to take it. 8.  Chronic urinary catheter, stable. 9.  H/o CVA 10.  Paraplegia with wheelchair dependent. Stable, fall precaution 11. Sinus allergy, given flonase and loratadine 12.  Right hand/arm tremor, likely from her baclofen use, monitor now baclofen is prn only. 13.  Mild elevated creatinine, monitor for now Full Code Additional Notes:   
 
Case discussed with:  [x]Patient  []Family  []Nursing  []Case Management DVT Prophylaxis:  [x]Lovenox  []Hep SQ  []SCDs  []Coumadin   []On Heparin gtt Signed By: Toi Orta MD   
 2019 4:27 PM  
  
 
 
Objective:  
VS:  
Visit Vitals /78 (BP 1 Location: Right arm, BP Patient Position: At rest) Pulse 73 Temp 96.6 °F (35.9 °C) Resp 19 Ht 5' 7\" (1.702 m) Wt 111 kg (244 lb 11.4 oz) LMP  (LMP Unknown) SpO2 98% BMI 38.33 kg/m² Tmax/24hrs: Temp (24hrs), Av.9 °F (36.1 °C), Min:96.6 °F (35.9 °C), Max:97.1 °F (36.2 °C) Intake/Output Summary (Last 24 hours) at 2019 1627 Last data filed at 2019 1200 Gross per 24 hour Intake 1210 ml Output 4400 ml Net -3190 ml Tele: sinus tachycardia General:  Cooperative, No acute distress, speaks in full sentence while in bed HEENT: PERRL, EOMI, supple neck, no JVD, dry oral mucosa Cardiovascular: S1S2 regular, no rub/gallop Pulmonary: Air entry bilaterally, no wheezing, ++ crackle bilaterally GI:  Soft, non tender, non distended, +bs, no guarding Extremities:  trace pedal edema, +distal pulses appreciated Neuro: AOx3, moving all upper extremities, lower extremities deficit. SKIN: right heel dressed, dry. Additional:  
Current Facility-Administered Medications Medication Dose Route Frequency  ondansetron (ZOFRAN) injection 4 mg  4 mg IntraVENous Q4H PRN  
 fluticasone (FLONASE) 50 mcg/actuation nasal spray 2 Spray  2 Spray Both Nostrils DAILY  loratadine (CLARITIN) tablet 10 mg  10 mg Oral DAILY  acetaminophen (TYLENOL) tablet 650 mg  650 mg Oral TID  baclofen (LIORESAL) tablet 10 mg  10 mg Oral TID  calcium carbonate (TUMS) chewable tablet 200 mg [elemental]  200 mg Oral TID WITH MEALS  
  diphenhydrAMINE (BENADRYL) injection 12.5 mg  12.5 mg IntraVENous Q8H PRN  
 sodium hypochlorite (QUARTER STRENGTH DAKIN'S) 0.125% irrigation (bottle)   Topical BID  carvedilol (COREG) tablet 6.25 mg  6.25 mg Oral BID  famotidine (PEPCID) tablet 20 mg  20 mg Oral QHS  folic acid (FOLVITE) tablet 1 mg  1 mg Oral DAILY  furosemide (LASIX) tablet 40 mg  40 mg Oral DAILY  gabapentin (NEURONTIN) capsule 600 mg  600 mg Oral BID  insulin glargine (LANTUS) injection 10 Units  10 Units SubCUTAneous QHS  insulin lispro (HUMALOG) injection 5 Units  0.05 Units/kg SubCUTAneous TID WITH MEALS  enoxaparin (LOVENOX) injection 40 mg  40 mg SubCUTAneous Q24H  
 meropenem (MERREM) 1 g in sterile water (preservative free) 20 mL IV syringe  1 g IntraVENous Q8H  
 acetaminophen (TYLENOL) tablet 650 mg  650 mg Oral Q4H PRN Labs:   
Recent Results (from the past 24 hour(s)) GLUCOSE, POC Collection Time: 01/01/19  5:12 PM  
Result Value Ref Range Glucose (POC) 144 (H) 70 - 110 mg/dL GLUCOSE, POC Collection Time: 01/01/19 10:23 PM  
Result Value Ref Range Glucose (POC) 135 (H) 70 - 110 mg/dL GLUCOSE, POC Collection Time: 01/02/19  6:29 AM  
Result Value Ref Range Glucose (POC) 122 (H) 70 - 110 mg/dL GLUCOSE, POC Collection Time: 01/02/19 11:18 AM  
Result Value Ref Range Glucose (POC) 148 (H) 70 - 110 mg/dL Xray of right foot: 12/27/18 IMPRESSION:   
  
1. Soft tissue defect in the posterior heel region. 2.  Foreshortened calcaneus with sharply marginated posterior calcaneal region. Query recent surgical intervention. Possible subtle cortical defect at the 
inferior margin.   Developing infectious process cannot be excluded.

## 2019-01-03 LAB
ANION GAP SERPL CALC-SCNC: 5 MMOL/L (ref 3–18)
APPEARANCE UR: CLEAR
BACTERIA URNS QL MICRO: ABNORMAL /HPF
BASOPHILS # BLD: 0 K/UL (ref 0–0.1)
BASOPHILS NFR BLD: 1 % (ref 0–2)
BILIRUB UR QL: NEGATIVE
BUN SERPL-MCNC: 15 MG/DL (ref 7–18)
BUN/CREAT SERPL: 12 (ref 12–20)
CALCIUM SERPL-MCNC: 9 MG/DL (ref 8.5–10.1)
CHLORIDE SERPL-SCNC: 105 MMOL/L (ref 100–108)
CO2 SERPL-SCNC: 31 MMOL/L (ref 21–32)
COLOR UR: YELLOW
CREAT SERPL-MCNC: 1.29 MG/DL (ref 0.6–1.3)
DIFFERENTIAL METHOD BLD: ABNORMAL
EOSINOPHIL # BLD: 0.3 K/UL (ref 0–0.4)
EOSINOPHIL NFR BLD: 6 % (ref 0–5)
EPITH CASTS URNS QL MICRO: ABNORMAL /LPF (ref 0–5)
ERYTHROCYTE [DISTWIDTH] IN BLOOD BY AUTOMATED COUNT: 17.3 % (ref 11.6–14.5)
GLUCOSE BLD STRIP.AUTO-MCNC: 108 MG/DL (ref 70–110)
GLUCOSE BLD STRIP.AUTO-MCNC: 122 MG/DL (ref 70–110)
GLUCOSE BLD STRIP.AUTO-MCNC: 98 MG/DL (ref 70–110)
GLUCOSE BLD STRIP.AUTO-MCNC: 99 MG/DL (ref 70–110)
GLUCOSE SERPL-MCNC: 80 MG/DL (ref 74–99)
GLUCOSE UR STRIP.AUTO-MCNC: NEGATIVE MG/DL
HCT VFR BLD AUTO: 32.2 % (ref 35–45)
HGB BLD-MCNC: 10.3 G/DL (ref 12–16)
HGB UR QL STRIP: NEGATIVE
KETONES UR QL STRIP.AUTO: NEGATIVE MG/DL
LEUKOCYTE ESTERASE UR QL STRIP.AUTO: ABNORMAL
LYMPHOCYTES # BLD: 1.5 K/UL (ref 0.9–3.6)
LYMPHOCYTES NFR BLD: 33 % (ref 21–52)
MAGNESIUM SERPL-MCNC: 2.2 MG/DL (ref 1.6–2.6)
MCH RBC QN AUTO: 27 PG (ref 24–34)
MCHC RBC AUTO-ENTMCNC: 32 G/DL (ref 31–37)
MCV RBC AUTO: 84.3 FL (ref 74–97)
MONOCYTES # BLD: 0.3 K/UL (ref 0.05–1.2)
MONOCYTES NFR BLD: 8 % (ref 3–10)
NEUTS SEG # BLD: 2.3 K/UL (ref 1.8–8)
NEUTS SEG NFR BLD: 52 % (ref 40–73)
NITRITE UR QL STRIP.AUTO: NEGATIVE
PH UR STRIP: 7 [PH] (ref 5–8)
PLATELET # BLD AUTO: 203 K/UL (ref 135–420)
PMV BLD AUTO: 9.1 FL (ref 9.2–11.8)
POTASSIUM SERPL-SCNC: 3.6 MMOL/L (ref 3.5–5.5)
PROT UR STRIP-MCNC: NEGATIVE MG/DL
RBC # BLD AUTO: 3.82 M/UL (ref 4.2–5.3)
RBC #/AREA URNS HPF: ABNORMAL /HPF (ref 0–5)
SODIUM SERPL-SCNC: 141 MMOL/L (ref 136–145)
SP GR UR REFRACTOMETRY: <1.005 (ref 1–1.03)
UROBILINOGEN UR QL STRIP.AUTO: 0.2 EU/DL (ref 0.2–1)
WBC # BLD AUTO: 4.4 K/UL (ref 4.6–13.2)
WBC URNS QL MICRO: ABNORMAL /HPF (ref 0–4)

## 2019-01-03 PROCEDURE — 36415 COLL VENOUS BLD VENIPUNCTURE: CPT

## 2019-01-03 PROCEDURE — 81001 URINALYSIS AUTO W/SCOPE: CPT

## 2019-01-03 PROCEDURE — 74011250637 HC RX REV CODE- 250/637: Performed by: INTERNAL MEDICINE

## 2019-01-03 PROCEDURE — 74011000250 HC RX REV CODE- 250: Performed by: HOSPITALIST

## 2019-01-03 PROCEDURE — 74011000258 HC RX REV CODE- 258: Performed by: INTERNAL MEDICINE

## 2019-01-03 PROCEDURE — 65270000029 HC RM PRIVATE

## 2019-01-03 PROCEDURE — 74011250636 HC RX REV CODE- 250/636: Performed by: INTERNAL MEDICINE

## 2019-01-03 PROCEDURE — 85025 COMPLETE CBC W/AUTO DIFF WBC: CPT

## 2019-01-03 PROCEDURE — 87086 URINE CULTURE/COLONY COUNT: CPT

## 2019-01-03 PROCEDURE — 77030035230

## 2019-01-03 PROCEDURE — 82962 GLUCOSE BLOOD TEST: CPT

## 2019-01-03 PROCEDURE — 74011250636 HC RX REV CODE- 250/636: Performed by: HOSPITALIST

## 2019-01-03 PROCEDURE — 74011250637 HC RX REV CODE- 250/637: Performed by: HOSPITALIST

## 2019-01-03 PROCEDURE — 80048 BASIC METABOLIC PNL TOTAL CA: CPT

## 2019-01-03 PROCEDURE — 74011636637 HC RX REV CODE- 636/637: Performed by: HOSPITALIST

## 2019-01-03 PROCEDURE — 83735 ASSAY OF MAGNESIUM: CPT

## 2019-01-03 RX ORDER — ONDANSETRON 4 MG/1
4 TABLET, FILM COATED ORAL
Status: DISCONTINUED | OUTPATIENT
Start: 2019-01-03 | End: 2019-01-08 | Stop reason: HOSPADM

## 2019-01-03 RX ORDER — BACLOFEN 10 MG/1
10 TABLET ORAL
Status: DISCONTINUED | OUTPATIENT
Start: 2019-01-03 | End: 2019-01-08 | Stop reason: HOSPADM

## 2019-01-03 RX ORDER — DIPHENHYDRAMINE HCL 25 MG
25 CAPSULE ORAL
Status: DISCONTINUED | OUTPATIENT
Start: 2019-01-03 | End: 2019-01-08 | Stop reason: HOSPADM

## 2019-01-03 RX ORDER — AMOXICILLIN AND CLAVULANATE POTASSIUM 400; 57 MG/5ML; MG/5ML
800 POWDER, FOR SUSPENSION ORAL EVERY 12 HOURS
Status: DISCONTINUED | OUTPATIENT
Start: 2019-01-03 | End: 2019-01-08 | Stop reason: HOSPADM

## 2019-01-03 RX ADMIN — GABAPENTIN 600 MG: 300 CAPSULE ORAL at 08:08

## 2019-01-03 RX ADMIN — CALCIUM CARBONATE (ANTACID) CHEW TAB 500 MG 200 MG: 500 CHEW TAB at 08:08

## 2019-01-03 RX ADMIN — LORATADINE 10 MG: 10 TABLET ORAL at 08:09

## 2019-01-03 RX ADMIN — ACETAMINOPHEN 650 MG: 325 TABLET ORAL at 15:00

## 2019-01-03 RX ADMIN — ACETAMINOPHEN 650 MG: 325 TABLET ORAL at 08:08

## 2019-01-03 RX ADMIN — CALCIUM CARBONATE (ANTACID) CHEW TAB 500 MG 200 MG: 500 CHEW TAB at 12:40

## 2019-01-03 RX ADMIN — ACETAMINOPHEN 650 MG: 325 TABLET ORAL at 20:35

## 2019-01-03 RX ADMIN — CALCIUM CARBONATE (ANTACID) CHEW TAB 500 MG 200 MG: 500 CHEW TAB at 16:16

## 2019-01-03 RX ADMIN — AMOXICILLIN AND CLAVULANATE POTASSIUM 800 MG: 400; 57 POWDER, FOR SUSPENSION ORAL at 23:28

## 2019-01-03 RX ADMIN — ENOXAPARIN SODIUM 40 MG: 100 INJECTION SUBCUTANEOUS at 17:19

## 2019-01-03 RX ADMIN — GABAPENTIN 600 MG: 300 CAPSULE ORAL at 17:19

## 2019-01-03 RX ADMIN — AMOXICILLIN AND CLAVULANATE POTASSIUM 800 MG: 400; 57 POWDER, FOR SUSPENSION ORAL at 15:06

## 2019-01-03 RX ADMIN — MEROPENEM 1 G: 1 INJECTION, POWDER, FOR SOLUTION INTRAVENOUS at 16:16

## 2019-01-03 RX ADMIN — INSULIN GLARGINE 10 UNITS: 100 INJECTION, SOLUTION SUBCUTANEOUS at 22:31

## 2019-01-03 RX ADMIN — FUROSEMIDE 40 MG: 40 TABLET ORAL at 08:08

## 2019-01-03 RX ADMIN — CARVEDILOL 6.25 MG: 6.25 TABLET, FILM COATED ORAL at 08:08

## 2019-01-03 RX ADMIN — MEROPENEM 1 G: 1 INJECTION, POWDER, FOR SOLUTION INTRAVENOUS at 08:08

## 2019-01-03 RX ADMIN — DIPHENHYDRAMINE HYDROCHLORIDE 25 MG: 25 CAPSULE ORAL at 22:41

## 2019-01-03 RX ADMIN — FAMOTIDINE 20 MG: 20 TABLET ORAL at 22:31

## 2019-01-03 RX ADMIN — PROMETHAZINE HYDROCHLORIDE 12.5 MG: 25 INJECTION, SOLUTION INTRAMUSCULAR; INTRAVENOUS at 05:43

## 2019-01-03 RX ADMIN — CARVEDILOL 6.25 MG: 6.25 TABLET, FILM COATED ORAL at 17:19

## 2019-01-03 NOTE — ROUTINE PROCESS
Patient is alert and oriented times three with no signs or symptoms of distress. Dressing is intact dry and and pulses palpable

## 2019-01-03 NOTE — PROGRESS NOTES
Patient is not available to be assessed at this time. [facility]plain [facility]  
[phone]

## 2019-01-03 NOTE — PROGRESS NOTES
Hospitalist Progress Note Patient: Solo Arora Age: 76 y.o. : 1950 MR#: [id number] SSN: [id number] Date/Time: 1/3/2019 4:43 PM 
 
Subjective: She was admitted on 18 for right heel ulcer infection despite on wound vac with care. Outpt workup was suspicious for osteomyelitis. She was started on IV antibiotic with history of ESBL organisms. She underwent debridement of right heel with bone biopsy/culture, application of ACell graft (18). She tolerated well. Feels better today off Baclofen. Hand shake less. Nausea improved. No fever. Still have loose. Dr. Nicolas Torres added Augmentin, currently without rash or side effect. ROS: no fever/chills, no headache, no dizziness,no sinus congestion, No swallowing pain, No chest pain, no palpitation, no shortness of breath, no abd pain, No diarrhea, no urinary complaint, no leg pain or swelling Assessment/Plan: 1. Chronic Right heel ulcer with infection, ?osteomyelitis -s/p debridement of Right heel and bone biopsy 
   - cont Meropenem, ID added Augmentin, f/u cultures 
   -wound care per Podiatry. -ID follows 2. H/o ESBL in right foot ulcer 3. DMT2, on sliding scale insulin 4. Non-ischemic cardiomyopathy, EF 50% (2017), s/p BiV AICD 
   -no shortness of breath, has been maintain on her home dose lasix 
   - no decompensation evidence  
   - elevated ProBNP as previous. Continue lasix and carvedilol 5. HTN, on carvedilol and lasix 6. HALI, ? CPAP 7. Chronic normocytic anemia, evidence of low iron, but elevated ferritin,  
    -she stated that she has sickle cell trait, could contribute to her anemia. - can hold off folate if she does not want to take it. 8.  Chronic urinary catheter, stable. 9.  H/o CVA 10.  Paraplegia with wheelchair dependent. Stable, fall precaution 11. Sinus allergy, given flonase and loratadine 12.   Right hand/arm tremor, likely from her baclofen use, monitor now baclofen is prn only. 13.  Mild elevated creatinine, monitor for now, resolved. - she take 60mg of lasix at home, only using 40mg here. Full Code Additional Notes:   
 
Case discussed with:  [x]Patient  []Family  []Nursing  []Case Management DVT Prophylaxis:  [x]Lovenox  []Hep SQ  []SCDs  []Coumadin   []On Heparin gtt Signed By: Deborah Calhoun MD   
 January 3, 2019 4:43 PM  
  
 
 
Objective:  
VS:  
Visit Vitals /65 Pulse 76 Temp 97.3 °F (36.3 °C) Resp 18 Ht 5' 7\" (1.702 m) Wt 112 kg (247 lb) LMP  (LMP Unknown) SpO2 99% BMI 38.69 kg/m² Tmax/24hrs: Temp (24hrs), Av.3 °F (36.3 °C), Min:96.7 °F (35.9 °C), Max:97.9 °F (36.6 °C) Intake/Output Summary (Last 24 hours) at 1/3/2019 1643 Last data filed at 1/3/2019 1145 Gross per 24 hour Intake 1980 ml Output 4350 ml Net -2370 ml Tele: sinus tachycardia General:  Cooperative, No acute distress, speaks in full sentence while in bed HEENT: PERRL, EOMI, supple neck, no JVD, dry oral mucosa Cardiovascular: S1S2 regular, no rub/gallop Pulmonary: Air entry bilaterally, no wheezing, ++ crackle bilaterally GI:  Soft, non tender, non distended, +bs, no guarding Extremities:  trace pedal edema, +distal pulses appreciated Neuro: AOx3, moving all upper extremities, lower extremities deficit. SKIN: right heel dressed, dry. Additional:  
Current Facility-Administered Medications Medication Dose Route Frequency  amoxicillin-clavulanate (AUGMENTIN) 400-57 mg/5 mL oral suspension 800 mg  800 mg Oral Q12H  diphenhydrAMINE (BENADRYL) capsule 25 mg  25 mg Oral Q6H PRN  
 ondansetron hcl (ZOFRAN) tablet 4 mg  4 mg Oral Q6H PRN  
 baclofen (LIORESAL) tablet 10 mg  10 mg Oral TID PRN  promethazine (PHENERGAN) 12.5 mg in 0.9% sodium chloride 50 mL IVPB  12.5 mg IntraVENous Q6H PRN  
 fluticasone (FLONASE) 50 mcg/actuation nasal spray 2 Spray  2 Spray Both Nostrils DAILY  loratadine (CLARITIN) tablet 10 mg  10 mg Oral DAILY  acetaminophen (TYLENOL) tablet 650 mg  650 mg Oral TID  calcium carbonate (TUMS) chewable tablet 200 mg [elemental]  200 mg Oral TID WITH MEALS  sodium hypochlorite (QUARTER STRENGTH DAKIN'S) 0.125% irrigation (bottle)   Topical BID  carvedilol (COREG) tablet 6.25 mg  6.25 mg Oral BID  famotidine (PEPCID) tablet 20 mg  20 mg Oral QHS  furosemide (LASIX) tablet 40 mg  40 mg Oral DAILY  gabapentin (NEURONTIN) capsule 600 mg  600 mg Oral BID  insulin glargine (LANTUS) injection 10 Units  10 Units SubCUTAneous QHS  insulin lispro (HUMALOG) injection 5 Units  0.05 Units/kg SubCUTAneous TID WITH MEALS  enoxaparin (LOVENOX) injection 40 mg  40 mg SubCUTAneous Q24H  
 meropenem (MERREM) 1 g in sterile water (preservative free) 20 mL IV syringe  1 g IntraVENous Q8H  
 acetaminophen (TYLENOL) tablet 650 mg  650 mg Oral Q4H PRN Labs:   
Recent Results (from the past 24 hour(s)) GLUCOSE, POC Collection Time: 01/02/19  9:39 PM  
Result Value Ref Range Glucose (POC) 106 70 - 110 mg/dL CBC WITH AUTOMATED DIFF Collection Time: 01/03/19  4:42 AM  
Result Value Ref Range WBC 4.4 (L) 4.6 - 13.2 K/uL  
 RBC 3.82 (L) 4.20 - 5.30 M/uL  
 HGB 10.3 (L) 12.0 - 16.0 g/dL HCT 32.2 (L) 35.0 - 45.0 % MCV 84.3 74.0 - 97.0 FL  
 MCH 27.0 24.0 - 34.0 PG  
 MCHC 32.0 31.0 - 37.0 g/dL  
 RDW 17.3 (H) 11.6 - 14.5 % PLATELET 231 122 - 587 K/uL MPV 9.1 (L) 9.2 - 11.8 FL  
 NEUTROPHILS 52 40 - 73 % LYMPHOCYTES 33 21 - 52 % MONOCYTES 8 3 - 10 % EOSINOPHILS 6 (H) 0 - 5 % BASOPHILS 1 0 - 2 %  
 ABS. NEUTROPHILS 2.3 1.8 - 8.0 K/UL  
 ABS. LYMPHOCYTES 1.5 0.9 - 3.6 K/UL  
 ABS. MONOCYTES 0.3 0.05 - 1.2 K/UL  
 ABS. EOSINOPHILS 0.3 0.0 - 0.4 K/UL  
 ABS. BASOPHILS 0.0 0.0 - 0.1 K/UL  
 DF AUTOMATED METABOLIC PANEL, BASIC Collection Time: 01/03/19  4:42 AM  
Result Value Ref Range  Sodium 141 136 - 145 mmol/L  
 Potassium 3.6 3.5 - 5.5 mmol/L Chloride 105 100 - 108 mmol/L  
 CO2 31 21 - 32 mmol/L Anion gap 5 3.0 - 18 mmol/L Glucose 80 74 - 99 mg/dL BUN 15 7.0 - 18 MG/DL Creatinine 1.29 0.6 - 1.3 MG/DL  
 BUN/Creatinine ratio 12 12 - 20 GFR est AA 50 (L) >60 ml/min/1.73m2 GFR est non-AA 41 (L) >60 ml/min/1.73m2 Calcium 9.0 8.5 - 10.1 MG/DL MAGNESIUM Collection Time: 01/03/19  4:42 AM  
Result Value Ref Range Magnesium 2.2 1.6 - 2.6 mg/dL GLUCOSE, POC Collection Time: 01/03/19  6:07 AM  
Result Value Ref Range Glucose (POC) 99 70 - 110 mg/dL GLUCOSE, POC Collection Time: 01/03/19 11:34 AM  
Result Value Ref Range Glucose (POC) 98 70 - 110 mg/dL GLUCOSE, POC Collection Time: 01/03/19  3:39 PM  
Result Value Ref Range Glucose (POC) 108 70 - 110 mg/dL Xray of right foot: 12/27/18 IMPRESSION:   
  
1. Soft tissue defect in the posterior heel region. 2.  Foreshortened calcaneus with sharply marginated posterior calcaneal region. Query recent surgical intervention. Possible subtle cortical defect at the 
inferior margin.   Developing infectious process cannot be excluded.

## 2019-01-03 NOTE — CONSULTS
Infectious Disease Consultation Note    Requested by: Dr. Nolberto Mcardle    Reason: right heel infected ulcer    Current abx Prior abx   Meropenem since 12/28/18 linezolid 12/27-12/28/18     Lines:       Assessment :    76 y. o. female who has a h/o  uncontrolled DM2 (last hgbA1C 8.7 on 10/18/18), HTN, CHF, CAD and paraplegia who presented to ed on 10/17/18 due to worsening wound on her right heel.           hospitalization (4/20/17-5/15/17) for Sepsis  secondary to group B streptococcus bloodstream infection (positive blood cultures 4/20, negative blood cultures 4/21). Most likely source of bloodstream infection is infected right psoas hematoma/abscess. S/p ct guided drainage of hematoma on 4/20  - cultures group B streptococcus  Paraplegia since 4/20 likely due to spinal cord infarct/septic thrombophlebitis.        bilateral LE dvt - s/p IVC filter placement 5/11     Hospitalization 8/2018 for infected right heel ulcer, acute osteomyelitis right calcaneum. S/p Radical debridement of necrotic tissue and debridement of heel bone.  Wound irrigation, application of wound VAC on 8/20/18. Intra op cultures negative. CRP 2.4 on 8/21/18     Hospitalization 10/2018 for acute on chronic osteomyelitis right calcaneum. S/p I&D. Intra op pathology confirms diagnosis of acute on chronic osteomyelitis. Intra op cultures 10/19 (off antibiotics)- esbl klebsiella, rare enterococcus faecalis, peptostreptococcus.      Esr: 62 on 10/18. Crp: 2.4 on 10/19     Wound cultures 10/17- MRSA, esbl klebsiella, enterococcus faecalis, diphtheroids     Urine cultures 10/17 reveals esbl e.coli, klebsiella likely colonizer. No s/s to suggest cystitis. Now with non healing right heel ulcer. Highly complex clinical picture. Clinical presentation c/w acute cellulitis right foot/leg likely due to non healing right heel ulcer. S/p I&D 12/31- intra op cultures negative. Intra op findings d/w dr. Cammy Mustafa. No purulence.  No evidence of chronic osteomyelitis. Histology of calcaneum suggestive of chronic inflammation. Looking at the above information, I am concerned that patient's cellulitis was likely due to acute on chronic osteomyelitis right calcaneum - breakthrough infection after discontinuation of antibiotics. Recurrence despite recent prolonged iv abx makes abx management very complicated. Due to neuropathy and delayed healing, she will likely need very prolonged abx for cure. This can be best achieved with prolonged po abx.        abx management highly complicated due to h/o multiple abx allergies including ampicillin/vancomycin. Has tolerated ceftriaxone in the past. Significant itching with vancomycin, quinolones. Patient is willing to try liquid antibiotics to see if she can tolerate it. Her side effects on most abx include nausea, itching sometimes controlled with benadryl. resolved edema right leg/right foot     Recommendations:      1. cont iv meropenem, start po liquid augmentin. Monitor for allergic reaction  2. f/u podiatry recommendations  3. Will make decisions about longterm po abx versus iv abx based on clinical course         Advance Care planning: full code: discussed  with patient/surrogate decision maker:Tomi Gonzalez: [phone]  [facility] plan was discussed in details with patient, dr. Damion Diez. Please call me if any further questions or concerns. Will continue to participate in the care of this patient.     Thank you for consultation request.    HPI:    76 y. o. female who has a h/o  uncontrolled DM2 (last hgbA1C 8.7 on 10/18/18), HTN, CHF, CAD and paraplegia who presented to ed on 10/17/18 due to worsening wound on her right heel.      Patient is known to me from prior inpatient consultation at [facility]. She had h/o sepsis due to group strep bacteremia, infected right psoas hematoma/abscess with subsequent paraplegia since 4/20/17 due to spinal cord infarct, septic thrombophlebitis.  She has been wheelchair bound since then. She noted a small ulcer right heel since December. Pt mentions she developed the wound from hitting her foot on a curb when she was getting fitted for a wheel chair. She says the wound on her R heel has been hard to heal with occasional foul smelling discharge. She was followed up by dr. Arabella Louis (podiatrist) and was managed with wound care, abx. She came to ed with foul smelling drainage right heel, chills on 8/16/18. X ray right heel didn't reveal evidence of osteomyelitis. She was evaluated by podiatrist. Ammon Harada debridement of necrotic tissue and debridement of heel bone.  Wound irrigation, application of wound VAC on 8/20/18. Intra op cultures negative. I was consulted for further recommendations. she was treated with 4 weeks of oral antibiotics since there was no definitive evidence of osteomyelitis. Patient couldn't tolerate her pills despite benadryl and missed many doses. Her wound was gradually healing up until a week ago when she started having increased right foot/leg swelling, drainage. She was admitted to [facility] on 10/17/18 for acute on chronic osteomyelitis right calcaneum. S/p I&D. Intra op pathology confirms diagnosis of acute on chronic osteomyelitis. Intra op cultures 10/19 (off antibiotics)- esbl klebsiella, rare enterococcus faecalis, peptostreptococcus. I  Was consulted for further recommendations. Patient was discharged on iv meropenem, daptomycin till 12/7/18. Was seen by dr. Caron Garcia as outpatient. Was doing fine. Per patient,  right heel ulcer was heeling well and she was supposed to get a skin graft. However last week she started having increased redness/swelling right leg, noted a strong odor from wound on right foot, although her wound vac was working well, and when seen by podiatry 12/27, there was a concern for worsening wound healing and development of osteo. Hence, admitted. She underwent I&D 12/31/18. I have been consulted for further recommendations.      I discussed intra op findings with dr. Kassandra Leon. No definitive evidence of osteomyelitis noted intra op. Patient denies headaches, visual disturbances, sore throat, runny nose, earaches, cp, sob, cough, abdominal pain, diarrhea, burning micturition, or weakness in extremities. she denies back pain/flank pain.  prior h/o MRSA colonization/ infection in the past.                      Past Medical History:   Diagnosis Date    Acute paraplegia ([facility] [address] 4/20/2017    Benign hypertensive heart disease with systolic CHF, NYHA class 2 ([address] 9/5/2012    Biventricular implantable cardioverter-defibrillator in situ 04/28/2005    Upgraded to BiV AICD; gen change 4/2008; pocket revision 10/2009; Abdominal - done on 8/22/2012 by Dr. Turner Feldman Cardiac cath 08/15/1996    Patent coronaries. Elev LVEDP. EF 50-55%.  Cardiac echocardiogram 06/23/2015    Ltd study. EF 45-50%. Mild, diffuse hypk. Severe apical hypk. No mass or thrombus was clearly identified, although imaging was suboptimal.      Cardiac nuclear imaging test 06/19/2015    Fixed distal apical, distal septal defect more likely due to RV pacing than prior infarct. No ischemia. EF 46%. RWMA c/w RV pacing. Nondiagnostic EKG on pharm stress test.      Cardiovascular lower extremity venous duplex 09/04/2012    Acute, non-occlusive DVT in CFV on right. No DVT on left. No superficial thrombosis bilaterally.  Cardiovascular upper extremity venous duplex 08/27/2012    DVT in axillary vein on left. Left subclavian was not visualized.     Chronic anemia 9/5/2012    Chronic systolic heart failure (HCC)     Decreased calculated glomerular filtration rate (GFR) 3/30/2017    Calculated GFR equivalent to that of CKD stage 3 = 30-59 ml/min    Diabetic neuropathy associated with type 2 diabetes mellitus ([facility] [address] 6/28/2011    Difficult airway for intubation 08/22/2012    see anesthesia airway note    Dyslipidemia 6/28/2011    Gout     History of complete heart block 6/28/2011    History of Coumadin therapy     Anticoagulation for DVT of the LUE; Discontinued on 3/30/2017    History of deep venous thrombosis 9/5/2012    Left upper extremity    History of pyelonephritis 3/30/2017    Left bundle branch block (LBBB) on electrocardiogram 6/28/2011    Nonischemic cardiomyopathy ([facility] [address] 6/28/2011    Obesity (BMI 35.0-39.9 without comorbidity) 3/13/2017    Obstructive sleep apnea on CPAP 2/7/2012    Psoas abscess, right ([address] 4/20/2017    Psoas hematoma, right, secondary to anticoagulant therapy 3/30/2017    Type 2 diabetes mellitus with diabetic neuropathy ([facility] [address] 6/28/2011       Past Surgical History:   Procedure Laterality Date    HX CARPAL TUNNEL RELEASE  4/07    right     HX CHOLECYSTECTOMY  1994    HX HYSTERECTOMY  1973    HX OTHER SURGICAL  6/11/2012    AICD revision    HX PACEMAKER  4/28/2005    [employer] AICD          Medication List      ASK your doctor about these medications    baclofen 10 mg tablet  Commonly known as:  LIORESAL  Take 1 Tab by mouth three (3) times daily. carvedilol 6.25 mg tablet  Commonly known as:  COREG  Take 1 Tab by mouth two (2) times a day. cholecalciferol (VITAMIN D3) 5,000 unit Tab tablet  Commonly known as:  VITAMIN D3     famotidine 20 mg tablet  Commonly known as:  PEPCID  Take 1 Tab by mouth nightly. folic acid 1 mg tablet  Commonly known as:  FOLVITE  Take 1 Tab by mouth daily. furosemide 40 mg tablet  Commonly known as:  LASIX  1 tab daily     gabapentin 300 mg capsule  Commonly known as:  NEURONTIN  Take 2 Caps by mouth two (2) times a day. Indications: NEUROPATHIC PAIN     insulin glargine 100 unit/mL injection  Commonly known as:  LANTUS U-100 INSULIN  5 Units by SubCUTAneous route nightly.  Indications: type 2 diabetes mellitus     insulin lispro 100 unit/mL injection  Commonly known as:  HUMALOG  See sliding scale     metFORMIN  mg tablet  Commonly known as:  GLUCOPHAGE XR     ondansetron 4 mg disintegrating tablet  Commonly known as:  ZOFRAN ODT  1 Tab by SubLINGual route every six (6) hours as needed. Current Facility-Administered Medications   Medication Dose Route Frequency    baclofen (LIORESAL) tablet 10 mg  10 mg Oral TID PRN    promethazine (PHENERGAN) 12.5 mg in 0.9% sodium chloride 50 mL IVPB  12.5 mg IntraVENous Q6H PRN    fluticasone (FLONASE) 50 mcg/actuation nasal spray 2 Spray  2 Spray Both Nostrils DAILY    loratadine (CLARITIN) tablet 10 mg  10 mg Oral DAILY    acetaminophen (TYLENOL) tablet 650 mg  650 mg Oral TID    calcium carbonate (TUMS) chewable tablet 200 mg [elemental]  200 mg Oral TID WITH MEALS    diphenhydrAMINE (BENADRYL) injection 12.5 mg  12.5 mg IntraVENous Q8H PRN    sodium hypochlorite (QUARTER STRENGTH DAKIN'S) 0.125% irrigation (bottle)   Topical BID    carvedilol (COREG) tablet 6.25 mg  6.25 mg Oral BID    famotidine (PEPCID) tablet 20 mg  20 mg Oral QHS    furosemide (LASIX) tablet 40 mg  40 mg Oral DAILY    gabapentin (NEURONTIN) capsule 600 mg  600 mg Oral BID    insulin glargine (LANTUS) injection 10 Units  10 Units SubCUTAneous QHS    insulin lispro (HUMALOG) injection 5 Units  0.05 Units/kg SubCUTAneous TID WITH MEALS    enoxaparin (LOVENOX) injection 40 mg  40 mg SubCUTAneous Q24H    meropenem (MERREM) 1 g in sterile water (preservative free) 20 mL IV syringe  1 g IntraVENous Q8H    acetaminophen (TYLENOL) tablet 650 mg  650 mg Oral Q4H PRN       Allergies: Vancomycin; Ampicillin; Bactrim [sulfamethoxazole-trimethoprim]; Blueberry; Ciprofloxacin; Codeine; Crestor [rosuvastatin]; Darvocet a500 [propoxyphene n-acetaminophen]; Demerol [meperidine]; Levaquin [levofloxacin]; Lipitor [atorvastatin]; Magnesium oxide; Minocin [minocycline]; Pcn [penicillins]; Pravachol [pravastatin]; Shellfish derived; Sulfa (sulfonamide antibiotics); Ultracet [tramadol-acetaminophen]; Vicodin [hydrocodone-acetaminophen];  Vytorin 10-10 [ezetimibe-simvastatin]; and Percodan [oxycodone hcl-oxycodone-asa]    Family History   Problem Relation Age of Onset    Cancer Father         Leukemia     Social History     Socioeconomic History    Marital status:      Spouse name: Not on file    Number of children: Not on file    Years of education: Not on file    Highest education level: Not on file   Social Needs    Financial resource strain: Not on file    Food insecurity - worry: Not on file    Food insecurity - inability: Not on file   [employer] needs - medical: Not on file   [employer] needs - non-medical: Not on file   Occupational History    Not on file   Tobacco Use    Smoking status: Never Smoker    Smokeless tobacco: Never Used   Substance and Sexual Activity    Alcohol use: No    Drug use: No    Sexual activity: Yes     Partners: Male   Other Topics Concern    Not on file   Social History Narrative    Not on file     Social History     Tobacco Use   Smoking Status Never Smoker   Smokeless Tobacco Never Used        Temp (24hrs), Av.3 °F (36.3 °C), Min:96.7 °F (35.9 °C), Max:97.9 °F (36.6 °C)    Visit Vitals  /65   Pulse 76   Temp 97.3 °F (36.3 °C)   Resp 18   Ht 5' 7\" (1.702 m)   Wt 112 kg (247 lb)   LMP  (LMP Unknown)   SpO2 99%   BMI 38.69 kg/m²       ROS: 12 point ROS obtained in details. Pertinent positives as mentioned in HPI,   otherwise negative    Physical Exam:    General: Well developed, well nourished female laying on the bed, AAOx3 NAD      General:  awake alert and oriented   HEENT:  Normocephalic, atraumatic, PERRL, EOMI, no scleral icterus or pallor; no conjunctival hemmohage; nasal and oral mucous are moist and without evidence of lesions. Neck supple, no bruits.    Lymph Nodes:  no cervical, axillary or inguinal adenopathy   Lungs:  non-labored, bilaterally clear to auscultation- no crackles wheezes rales or rhonchi   Heart:  s1 and s2 irregular; no rubs or gallops, no edema, + pedal pulses Abdomen: soft, non-distended, active bowel sounds, no hepatomegaly, no splenomegaly. no tenderness over the left abdominal pacemaker, no overlying erythema or fluctuance,no tenderness   Genitourinary: deferred   Extremities:  no clubbing, cyanosis; resolved edema right leg/foot, right foot dressing not opened   Neurologic:  No gross focal sensory abnormalities; 5/5 muscle strength to upper extremities. 1/5 strength in lower extremities.  Cranial nerves intact   Skin:  Surgical scars abdomen, left neck well healed   Back: no paraspinal muscle guarding or rigidity, no spinal or paraspinal tenderness   Psychiatric:  No suicidal or homicidal ideations, appropriate mood and affect                  Labs: Results:   Chemistry Recent Labs     01/03/19  0442 01/01/19  0441   GLU 80 97    141   K 3.6 3.8    106   CO2 31 31   BUN 15 17   CREA 1.29 1.39*   CA 9.0 9.1   AGAP 5 4   BUCR 12 12      CBC w/Diff Recent Labs     01/03/19  0442 01/01/19  0441   WBC 4.4* 5.1   RBC 3.82* 3.60*   HGB 10.3* 9.8*   HCT 32.2* 30.2*    197   GRANS 52 60   LYMPH 33 27   EOS 6* 5      Microbiology Recent Labs     12/31/18  1226   CULT CULTURE IN PROGRESS,FURTHER UPDATES TO FOLLOW  CULTURE IN PROGRESS,FURTHER UPDATES TO FOLLOW          RADIOLOGY:    All available imaging studies/reports in [facility] for this admission were reviewed    Dr. Lucita Wick, Infectious Disease Specialist  [phone]  January 3, 2019  11:44 AM

## 2019-01-04 LAB
GLUCOSE BLD STRIP.AUTO-MCNC: 110 MG/DL (ref 70–110)
GLUCOSE BLD STRIP.AUTO-MCNC: 116 MG/DL (ref 70–110)
GLUCOSE BLD STRIP.AUTO-MCNC: 140 MG/DL (ref 70–110)
GLUCOSE BLD STRIP.AUTO-MCNC: 145 MG/DL (ref 70–110)

## 2019-01-04 PROCEDURE — 74011250637 HC RX REV CODE- 250/637: Performed by: INTERNAL MEDICINE

## 2019-01-04 PROCEDURE — 74011250636 HC RX REV CODE- 250/636: Performed by: HOSPITALIST

## 2019-01-04 PROCEDURE — 74011000250 HC RX REV CODE- 250: Performed by: INTERNAL MEDICINE

## 2019-01-04 PROCEDURE — 82962 GLUCOSE BLOOD TEST: CPT

## 2019-01-04 PROCEDURE — 65270000029 HC RM PRIVATE

## 2019-01-04 PROCEDURE — 74011250637 HC RX REV CODE- 250/637: Performed by: HOSPITALIST

## 2019-01-04 PROCEDURE — 74011000250 HC RX REV CODE- 250: Performed by: HOSPITALIST

## 2019-01-04 PROCEDURE — 74011636637 HC RX REV CODE- 636/637: Performed by: HOSPITALIST

## 2019-01-04 PROCEDURE — 74011250636 HC RX REV CODE- 250/636: Performed by: INTERNAL MEDICINE

## 2019-01-04 RX ORDER — CIPROFLOXACIN 500 MG/5ML
500 KIT ORAL EVERY 12 HOURS
Status: DISCONTINUED | OUTPATIENT
Start: 2019-01-05 | End: 2019-01-08 | Stop reason: HOSPADM

## 2019-01-04 RX ADMIN — ACETAMINOPHEN 650 MG: 325 TABLET ORAL at 14:12

## 2019-01-04 RX ADMIN — MEROPENEM 1 G: 1 INJECTION, POWDER, FOR SOLUTION INTRAVENOUS at 15:59

## 2019-01-04 RX ADMIN — CARVEDILOL 6.25 MG: 6.25 TABLET, FILM COATED ORAL at 10:19

## 2019-01-04 RX ADMIN — FAMOTIDINE 20 MG: 20 TABLET ORAL at 21:22

## 2019-01-04 RX ADMIN — FUROSEMIDE 40 MG: 40 TABLET ORAL at 10:19

## 2019-01-04 RX ADMIN — ACETAMINOPHEN 650 MG: 325 TABLET ORAL at 10:18

## 2019-01-04 RX ADMIN — GABAPENTIN 600 MG: 300 CAPSULE ORAL at 10:19

## 2019-01-04 RX ADMIN — AMOXICILLIN AND CLAVULANATE POTASSIUM 800 MG: 400; 57 POWDER, FOR SUSPENSION ORAL at 09:00

## 2019-01-04 RX ADMIN — MEROPENEM 1 G: 1 INJECTION, POWDER, FOR SOLUTION INTRAVENOUS at 10:17

## 2019-01-04 RX ADMIN — MEROPENEM 1 G: 1 INJECTION, POWDER, FOR SOLUTION INTRAVENOUS at 00:06

## 2019-01-04 RX ADMIN — CALCIUM CARBONATE (ANTACID) CHEW TAB 500 MG 200 MG: 500 CHEW TAB at 10:19

## 2019-01-04 RX ADMIN — FLUTICASONE PROPIONATE 2 SPRAY: 50 SPRAY, METERED NASAL at 09:00

## 2019-01-04 RX ADMIN — ACETAMINOPHEN 650 MG: 325 TABLET ORAL at 21:22

## 2019-01-04 RX ADMIN — ENOXAPARIN SODIUM 40 MG: 100 INJECTION SUBCUTANEOUS at 18:00

## 2019-01-04 RX ADMIN — LORATADINE 10 MG: 10 TABLET ORAL at 10:19

## 2019-01-04 RX ADMIN — INSULIN GLARGINE 10 UNITS: 100 INJECTION, SOLUTION SUBCUTANEOUS at 21:22

## 2019-01-04 RX ADMIN — CALCIUM CARBONATE (ANTACID) CHEW TAB 500 MG 200 MG: 500 CHEW TAB at 12:02

## 2019-01-04 RX ADMIN — DIPHENHYDRAMINE HYDROCHLORIDE 25 MG: 25 CAPSULE ORAL at 14:19

## 2019-01-04 RX ADMIN — GABAPENTIN 600 MG: 300 CAPSULE ORAL at 17:19

## 2019-01-04 RX ADMIN — AMOXICILLIN AND CLAVULANATE POTASSIUM 800 MG: 400; 57 POWDER, FOR SUSPENSION ORAL at 21:24

## 2019-01-04 RX ADMIN — CARVEDILOL 6.25 MG: 6.25 TABLET, FILM COATED ORAL at 17:20

## 2019-01-04 NOTE — ROUTINE PROCESS
Patient is alert and oriented times three with no signs or symptoms of distress. Dressing on foot is clean dry and intact.

## 2019-01-04 NOTE — PROGRESS NOTES
Infectious Disease Progress Note Requested by: Dr. James Leslie Reason: right heel infected ulcer Current abx Prior abx Meropenem since 12/28/18 linezolid 12/27-12/28/18 Lines:  
 
 
Assessment : 
 
76 y. o. female who has a h/o  uncontrolled DM2 (last hgbA1C 8.7 on 10/18/18), HTN, CHF, CAD and paraplegia who presented to ed on 10/17/18 due to worsening wound on her right heel.  
  
   
 hospitalization (4/20/17-5/15/17) for Sepsis  secondary to group B streptococcus bloodstream infection (positive blood cultures 4/20, negative blood cultures 4/21). Most likely source of bloodstream infection is infected right psoas hematoma/abscess. 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    
  
bilateral LE dvt - s/p IVC filter placement 5/11 
  
Hospitalization 8/2018 for infected right heel ulcer, acute osteomyelitis right calcaneum. S/p Radical debridement of necrotic tissue and debridement of heel bone.  Wound irrigation, application of wound VAC on 8/20/18. Intra op cultures negative. CRP 2.4 on 8/21/18 
  
Hospitalization 10/2018 for acute on chronic osteomyelitis right calcaneum. S/p I&D. Intra op pathology confirms diagnosis of acute on chronic osteomyelitis. Intra op cultures 10/19 (off antibiotics)- esbl klebsiella, rare enterococcus faecalis, peptostreptococcus.  
  
Esr: 62 on 10/18. Crp: 2.4 on 10/19 
  
Wound cultures 10/17- MRSA, esbl klebsiella, enterococcus faecalis, diphtheroids 
  
Urine cultures 10/17 reveals esbl e.coli, klebsiella likely colonizer. No s/s to suggest cystitis. Now with non healing right heel ulcer. Highly complex clinical picture. Clinical presentation c/w acute cellulitis right foot/leg likely due to non healing right heel ulcer. S/p I&D 12/31- intra op cultures negative. Intra op findings d/w dr. José Miguel Dolan. No purulence.  No evidence of chronic osteomyelitis. Histology of calcaneum suggestive of chronic inflammation. Looking at the above information, I am concerned that patient's cellulitis was likely due to acute on chronic osteomyelitis right calcaneum - breakthrough infection after discontinuation of antibiotics. Recurrence despite recent prolonged iv abx makes abx management very complicated. Due to neuropathy and delayed healing, she will likely need very prolonged abx for cure. This can be best achieved with prolonged po abx.  
  
 
abx management highly complicated due to h/o multiple abx allergies including ampicillin/vancomycin. Has tolerated ceftriaxone in the past. Significant itching with vancomycin, quinolones. Patient is willing to try liquid antibiotics to see if she can tolerate it. Her side effects on most abx include nausea, itching sometimes controlled with benadryl. resolved edema right leg/right foot 
  
Recommendations: 
   
1. cont iv meropenem,  po liquid augmentin (for enterococcus). Monitor for allergic reaction. If able to continue to tolerate po augmentin, will d/c meropenem, start po ciprofloxacin (liquid formulation) in am.  
2. f/u podiatry recommendations 3. Will make decisions about longterm po abx versus iv abx based on clinical course 
  
  
 Advance Care planning: full code: discussed  with patient/surrogate decision maker:Tomi Gonzalez: [phone] 
[facility] plan was discussed in details with patient, dr. Alf Braun. Please call me if any further questions or concerns. Will continue to participate in the care of this patient. 
  
 
subjective: 
 
Had some scratchy throat with augmentin. No major side effects. Patient denies headaches, visual disturbances, sore throat, runny nose, earaches, cp, sob, cough, abdominal pain, diarrhea, burning micturition, or weakness in extremities. she denies back pain/flank pain.    
  
  
 
 
  
Medication List  
  
ASK your doctor about these medications baclofen 10 mg tablet Commonly known as:  LIORESAL Take 1 Tab by mouth three (3) times daily. carvedilol 6.25 mg tablet Commonly known as:  Dennys Heal Take 1 Tab by mouth two (2) times a day. cholecalciferol (VITAMIN D3) 5,000 unit Tab tablet Commonly known as:  VITAMIN D3 
  
famotidine 20 mg tablet Commonly known as:  PEPCID Take 1 Tab by mouth nightly. folic acid 1 mg tablet Commonly known as:  Google Take 1 Tab by mouth daily. furosemide 40 mg tablet Commonly known as:  LASIX 
1 tab daily 
  
gabapentin 300 mg capsule Commonly known as:  NEURONTIN Take 2 Caps by mouth two (2) times a day. Indications: NEUROPATHIC PAIN 
  
insulin glargine 100 unit/mL injection Commonly known as:  LANTUS U-100 INSULIN 
5 Units by SubCUTAneous route nightly. Indications: type 2 diabetes mellitus 
  
insulin lispro 100 unit/mL injection Commonly known as:  HUMALOG See sliding scale 
  
metFORMIN  mg tablet Commonly known as:  GLUCOPHAGE XR 
  
ondansetron 4 mg disintegrating tablet Commonly known as:  ZOFRAN ODT 
1 Tab by SubLINGual route every six (6) hours as needed. Current Facility-Administered Medications Medication Dose Route Frequency  amoxicillin-clavulanate (AUGMENTIN) 400-57 mg/5 mL oral suspension 800 mg  800 mg Oral Q12H  diphenhydrAMINE (BENADRYL) capsule 25 mg  25 mg Oral Q6H PRN  
 ondansetron hcl (ZOFRAN) tablet 4 mg  4 mg Oral Q6H PRN  
 baclofen (LIORESAL) tablet 10 mg  10 mg Oral TID PRN  promethazine (PHENERGAN) 12.5 mg in 0.9% sodium chloride 50 mL IVPB  12.5 mg IntraVENous Q6H PRN  
 fluticasone (FLONASE) 50 mcg/actuation nasal spray 2 Spray  2 Spray Both Nostrils DAILY  loratadine (CLARITIN) tablet 10 mg  10 mg Oral DAILY  acetaminophen (TYLENOL) tablet 650 mg  650 mg Oral TID  calcium carbonate (TUMS) chewable tablet 200 mg [elemental]  200 mg Oral TID WITH MEALS  sodium hypochlorite (QUARTER STRENGTH DAKIN'S) 0.125% irrigation (bottle)   Topical BID  carvedilol (COREG) tablet 6.25 mg  6.25 mg Oral BID  famotidine (PEPCID) tablet 20 mg  20 mg Oral QHS  furosemide (LASIX) tablet 40 mg  40 mg Oral DAILY  gabapentin (NEURONTIN) capsule 600 mg  600 mg Oral BID  insulin glargine (LANTUS) injection 10 Units  10 Units SubCUTAneous QHS  insulin lispro (HUMALOG) injection 5 Units  0.05 Units/kg SubCUTAneous TID WITH MEALS  enoxaparin (LOVENOX) injection 40 mg  40 mg SubCUTAneous Q24H  
 meropenem (MERREM) 1 g in sterile water (preservative free) 20 mL IV syringe  1 g IntraVENous Q8H  
 acetaminophen (TYLENOL) tablet 650 mg  650 mg Oral Q4H PRN Allergies: Vancomycin; Ampicillin; Bactrim [sulfamethoxazole-trimethoprim]; Blueberry; Ciprofloxacin; Codeine; Crestor [rosuvastatin]; Darvocet a500 [propoxyphene n-acetaminophen]; Demerol [meperidine]; Levaquin [levofloxacin]; Lipitor [atorvastatin]; Magnesium oxide; Minocin [minocycline]; Pcn [penicillins]; Pravachol [pravastatin]; Shellfish derived; Sulfa (sulfonamide antibiotics); Ultracet [tramadol-acetaminophen]; Vicodin [hydrocodone-acetaminophen]; Vytorin 10-10 [ezetimibe-simvastatin]; and Percodan [oxycodone hcl-oxycodone-asa] Temp (24hrs), Av.5 °F (36.4 °C), Min:97 °F (36.1 °C), Max:97.9 °F (36.6 °C) Visit Vitals /64 Pulse 82 Temp 97.7 °F (36.5 °C) Resp 16 Ht 5' 7\" (1.702 m) Wt 112 kg (247 lb) LMP  (LMP Unknown) SpO2 96% BMI 38.69 kg/m² ROS: 12 point ROS obtained in details. Pertinent positives as mentioned in HPI,  
otherwise negative Physical Exam: 
 
General: Well developed, well nourished female laying on the bed, AAOx3 NAD 
   
General:  awake alert and oriented HEENT:  Normocephalic, atraumatic, PERRL, EOMI, no scleral icterus or pallor; no conjunctival hemmohage; nasal and oral mucous are moist and without evidence of lesions. Neck supple, no bruits. Lymph Nodes:  no cervical, axillary or inguinal adenopathy Lungs:  non-labored, bilaterally clear to auscultation- no crackles wheezes rales or rhonchi Heart:  s1 and s2 irregular; no rubs or gallops, no edema, + pedal pulses Abdomen: soft, non-distended, active bowel sounds, no hepatomegaly, no splenomegaly. no tenderness over the left abdominal pacemaker, no overlying erythema or fluctuance,no tenderness Genitourinary: deferred Extremities:  no clubbing, cyanosis; resolved edema right leg/foot, right foot dressing not opened Neurologic:  No gross focal sensory abnormalities; 5/5 muscle strength to upper extremities. 1/5 strength in lower extremities. Cranial nerves intact Skin:  Surgical scars abdomen, left neck well healed Back: no paraspinal muscle guarding or rigidity, no spinal or paraspinal tenderness Psychiatric:  No suicidal or homicidal ideations, appropriate mood and affect  
   
  
 
 
 
 
Labs: Results:  
Chemistry Recent Labs 01/03/19 
3010 GLU 80   
K 3.6  CO2 31 BUN 15  
CREA 1.29  
CA 9.0 AGAP 5  
BUCR 12  
  
CBC w/Diff Recent Labs 01/03/19 
7326 WBC 4.4*  
RBC 3.82* HGB 10.3* HCT 32.2*  
 GRANS 52 LYMPH 33 EOS 6* Microbiology Recent Labs 01/03/19 
1830 CULT NO GROWTH AFTER 15 HOURS  
  
 
 
RADIOLOGY: 
 
All available imaging studies/reports in [facility] for this admission were reviewed Dr. Hawa Yang, Infectious Disease Specialist 
[phone] January 4, 2019 11:44 AM

## 2019-01-04 NOTE — PROGRESS NOTES
Hospitalist Progress Note Patient: Shanique Lino Age: 76 y.o. : 1950 MR#: [id number] SSN: [id number] Date/Time: 2019 2:29 PM 
 
Subjective: She was admitted on 18 for right heel ulcer infection despite on wound vac with care. Outpt workup was suspicious for osteomyelitis. She was started on IV antibiotic with history of ESBL organisms. She underwent debridement of right heel with bone biopsy/culture, application of ACell graft (18). She tolerated well. Stated that she felt her throat was hard to swallow after taking the Augmentin, no rash. No fever. Off baclofen, her hands are better. Nausea improved. Biopsy of bone showed RIGHT CALCANEUS:  
BONE WITH FOCAL CHRONIC INFLAMMATION AND FIBROSIS Tissue culture has been pending. Dr. Rebeka Bethea has switched her meropenem to liquid cipro with Augmentin. ROS: no fever/chills, no headache, no dizziness,no sinus congestion, No swallowing pain, No chest pain, no palpitation, no shortness of breath, no abd pain, No diarrhea, no urinary complaint, no leg pain or swelling Assessment/Plan: 1. Chronic Right heel ulcer with infection, no evidence of osteomyelitis -s/p debridement of Right heel and bone biopsy RIGHT CALCANEUS:  
     BONE WITH FOCAL CHRONIC INFLAMMATION AND FIBROSIS  
   -she continue Augmentin, ciprofloxacin added for tomorrow - wound care per podiatry. - ID follows 
    - needs culture finalization 2. H/o ESBL in right foot ulcer 3. DMT2, on sliding scale insulin 4. Non-ischemic cardiomyopathy, EF 50% (2017), s/p BiV AICD 
   -no shortness of breath, has been maintain on her home dose lasix 
   - no decompensation evidence  
   - elevated ProBNP as previous. Continue lasix and carvedilol 5. HTN, on carvedilol and lasix 6. HALI, ? CPAP 7. Chronic normocytic anemia, evidence of low iron, but elevated ferritin, -she stated that she has sickle cell trait, could contribute to her anemia. - can hold off folate if she does not want to take it. 8.  Chronic urinary catheter, stable. 9.  H/o CVA 10.  Paraplegia with wheelchair dependent. Stable, fall precaution 11. Sinus allergy, given flonase and loratadine 12. Right hand/arm tremor, likely from her baclofen use, monitor now baclofen is prn only. 13.  Mild elevated creatinine, monitor for now, resolved. - resume 60mg of lasix at home Full Code Additional Notes:   
 
Case discussed with:  [x]Patient  []Family  []Nursing  []Case Management DVT Prophylaxis:  [x]Lovenox  []Hep SQ  []SCDs  []Coumadin   []On Heparin gtt Signed By: rTuman Maharaj MD   
 2019 2:29 PM  
  
 
 
Objective:  
VS:  
Visit Vitals /64 Pulse 82 Temp 97.7 °F (36.5 °C) Resp 16 Ht 5' 7\" (1.702 m) Wt 112 kg (247 lb) LMP  (LMP Unknown) SpO2 96% BMI 38.69 kg/m² Tmax/24hrs: Temp (24hrs), Av.5 °F (36.4 °C), Min:97 °F (36.1 °C), Max:97.9 °F (36.6 °C) Intake/Output Summary (Last 24 hours) at 2019 1429 Last data filed at 2019 6765 Gross per 24 hour Intake 1550 ml Output 2950 ml Net -1400 ml Tele: sinus tachycardia General:  Cooperative, No acute distress, speaks in full sentence while in bed HEENT: PERRL, EOMI, supple neck, no JVD, dry oral mucosa Cardiovascular: S1S2 regular, no rub/gallop Pulmonary: Air entry bilaterally, no wheezing, ++ crackle bilaterally GI:  Soft, non tender, non distended, +bs, no guarding Extremities:  trace pedal edema, +distal pulses appreciated Neuro: AOx3, moving all upper extremities, lower extremities deficit. SKIN: right heel dressed, dry. Additional:  
Current Facility-Administered Medications Medication Dose Route Frequency  [START ON 2019] ciprofloxacin (CIPRO) oral suspension 500 mg  500 mg Oral Q12H  amoxicillin-clavulanate (AUGMENTIN) 400-57 mg/5 mL oral suspension 800 mg  800 mg Oral Q12H  diphenhydrAMINE (BENADRYL) capsule 25 mg  25 mg Oral Q6H PRN  
 ondansetron hcl (ZOFRAN) tablet 4 mg  4 mg Oral Q6H PRN  
 baclofen (LIORESAL) tablet 10 mg  10 mg Oral TID PRN  promethazine (PHENERGAN) 12.5 mg in 0.9% sodium chloride 50 mL IVPB  12.5 mg IntraVENous Q6H PRN  
 fluticasone (FLONASE) 50 mcg/actuation nasal spray 2 Spray  2 Spray Both Nostrils DAILY  loratadine (CLARITIN) tablet 10 mg  10 mg Oral DAILY  acetaminophen (TYLENOL) tablet 650 mg  650 mg Oral TID  sodium hypochlorite (QUARTER STRENGTH DAKIN'S) 0.125% irrigation (bottle)   Topical BID  carvedilol (COREG) tablet 6.25 mg  6.25 mg Oral BID  famotidine (PEPCID) tablet 20 mg  20 mg Oral QHS  furosemide (LASIX) tablet 40 mg  40 mg Oral DAILY  gabapentin (NEURONTIN) capsule 600 mg  600 mg Oral BID  insulin glargine (LANTUS) injection 10 Units  10 Units SubCUTAneous QHS  insulin lispro (HUMALOG) injection 5 Units  0.05 Units/kg SubCUTAneous TID WITH MEALS  enoxaparin (LOVENOX) injection 40 mg  40 mg SubCUTAneous Q24H  
 meropenem (MERREM) 1 g in sterile water (preservative free) 20 mL IV syringe  1 g IntraVENous Q8H  
 acetaminophen (TYLENOL) tablet 650 mg  650 mg Oral Q4H PRN Labs:   
Recent Results (from the past 24 hour(s)) GLUCOSE, POC Collection Time: 01/03/19  3:39 PM  
Result Value Ref Range Glucose (POC) 108 70 - 110 mg/dL CULTURE, URINE Collection Time: 01/03/19  6:30 PM  
Result Value Ref Range Special Requests: NO SPECIAL REQUESTS Culture result: NO GROWTH AFTER 15 HOURS    
URINALYSIS W/ RFLX MICROSCOPIC Collection Time: 01/03/19  6:30 PM  
Result Value Ref Range Color YELLOW Appearance CLEAR Specific gravity <1.005 (L) 1.005 - 1.030  
 pH (UA) 7.0 5.0 - 8.0 Protein NEGATIVE  NEG mg/dL Glucose NEGATIVE  NEG mg/dL Ketone NEGATIVE  NEG mg/dL Bilirubin NEGATIVE  NEG Blood NEGATIVE  NEG Urobilinogen 0.2 0.2 - 1.0 EU/dL Nitrites NEGATIVE  NEG Leukocyte Esterase MODERATE (A) NEG URINE MICROSCOPIC ONLY Collection Time: 01/03/19  6:30 PM  
Result Value Ref Range WBC 10 to 15 0 - 4 /hpf  
 RBC NONE 0 - 5 /hpf Epithelial cells 2+ 0 - 5 /lpf Bacteria 1+ (A) NEG /hpf  
GLUCOSE, POC Collection Time: 01/03/19 10:34 PM  
Result Value Ref Range Glucose (POC) 122 (H) 70 - 110 mg/dL GLUCOSE, POC Collection Time: 01/04/19 12:01 PM  
Result Value Ref Range Glucose (POC) 110 70 - 110 mg/dL Xray of right foot: 12/27/18 IMPRESSION:   
  
1. Soft tissue defect in the posterior heel region. 2.  Foreshortened calcaneus with sharply marginated posterior calcaneal region. Query recent surgical intervention. Possible subtle cortical defect at the 
inferior margin.   Developing infectious process cannot be excluded.

## 2019-01-04 NOTE — ROUTINE PROCESS
Bedside and Verbal shift change report given to aditi LEBLANC (oncoming nurse) by Annabelle Quinn (offgoing nurse). Report included the following information SBAR, Kardex, Intake/Output, MAR and Recent Results.

## 2019-01-04 NOTE — ROUTINE PROCESS
Bedside and Verbal shift change report given to Judy Burnette (oncoming nurse) by Yaron Peralta (offgoing nurse). Report included the following information SBAR, Kardex, Intake/Output, MAR and Recent Results.

## 2019-01-04 NOTE — PROGRESS NOTES
Discharge planning Review chart. Met with patient to discuss discharge plan. Discharge plan is for home with home health. Pt is active with [facility]. To be determined per ID; if pt will need IV or po ABX. Spouse will provide transportation at discharge. CM will continue to monitor for transitional needs for a safe discharge. DURAN Guzman, RN Pager # [phone] Care Manager

## 2019-01-05 ENCOUNTER — APPOINTMENT (OUTPATIENT)
Dept: CT IMAGING | Age: 69
DRG: 629 | End: 2019-01-05
Attending: INTERNAL MEDICINE
Payer: COMMERCIAL

## 2019-01-05 LAB
BACTERIA SPEC CULT: ABNORMAL
BACTERIA SPEC CULT: ABNORMAL
BACTERIA SPEC CULT: NORMAL
BACTERIA SPEC CULT: NORMAL
ERYTHROCYTE [DISTWIDTH] IN BLOOD BY AUTOMATED COUNT: 17.2 % (ref 11.6–14.5)
GLUCOSE BLD STRIP.AUTO-MCNC: 100 MG/DL (ref 70–110)
GLUCOSE BLD STRIP.AUTO-MCNC: 105 MG/DL (ref 70–110)
GLUCOSE BLD STRIP.AUTO-MCNC: 111 MG/DL (ref 70–110)
GLUCOSE BLD STRIP.AUTO-MCNC: 119 MG/DL (ref 70–110)
GRAM STN SPEC: NORMAL
GRAM STN SPEC: NORMAL
HCT VFR BLD AUTO: 32.4 % (ref 35–45)
HGB BLD-MCNC: 10.6 G/DL (ref 12–16)
MCH RBC QN AUTO: 27.5 PG (ref 24–34)
MCHC RBC AUTO-ENTMCNC: 32.7 G/DL (ref 31–37)
MCV RBC AUTO: 84.2 FL (ref 74–97)
PLATELET # BLD AUTO: 195 K/UL (ref 135–420)
PMV BLD AUTO: 9.1 FL (ref 9.2–11.8)
RBC # BLD AUTO: 3.85 M/UL (ref 4.2–5.3)
SERVICE CMNT-IMP: ABNORMAL
SERVICE CMNT-IMP: NORMAL
SERVICE CMNT-IMP: NORMAL
WBC # BLD AUTO: 4.2 K/UL (ref 4.6–13.2)

## 2019-01-05 PROCEDURE — 82962 GLUCOSE BLOOD TEST: CPT

## 2019-01-05 PROCEDURE — 74011636637 HC RX REV CODE- 636/637: Performed by: HOSPITALIST

## 2019-01-05 PROCEDURE — 74011250637 HC RX REV CODE- 250/637: Performed by: HOSPITALIST

## 2019-01-05 PROCEDURE — 74011250637 HC RX REV CODE- 250/637: Performed by: INTERNAL MEDICINE

## 2019-01-05 PROCEDURE — 36415 COLL VENOUS BLD VENIPUNCTURE: CPT

## 2019-01-05 PROCEDURE — 74011250636 HC RX REV CODE- 250/636: Performed by: HOSPITALIST

## 2019-01-05 PROCEDURE — 65270000029 HC RM PRIVATE

## 2019-01-05 PROCEDURE — 85027 COMPLETE CBC AUTOMATED: CPT

## 2019-01-05 RX ADMIN — ENOXAPARIN SODIUM 40 MG: 100 INJECTION SUBCUTANEOUS at 17:21

## 2019-01-05 RX ADMIN — CIPROFLOXACIN 500 MG: KIT at 20:50

## 2019-01-05 RX ADMIN — INSULIN GLARGINE 10 UNITS: 100 INJECTION, SOLUTION SUBCUTANEOUS at 21:39

## 2019-01-05 RX ADMIN — ACETAMINOPHEN 650 MG: 325 TABLET ORAL at 07:45

## 2019-01-05 RX ADMIN — CARVEDILOL 6.25 MG: 6.25 TABLET, FILM COATED ORAL at 09:07

## 2019-01-05 RX ADMIN — Medication 1 CAPSULE: at 15:45

## 2019-01-05 RX ADMIN — LORATADINE 10 MG: 10 TABLET ORAL at 09:06

## 2019-01-05 RX ADMIN — AMOXICILLIN AND CLAVULANATE POTASSIUM 800 MG: 400; 57 POWDER, FOR SUSPENSION ORAL at 15:18

## 2019-01-05 RX ADMIN — FLUTICASONE PROPIONATE 2 SPRAY: 50 SPRAY, METERED NASAL at 09:06

## 2019-01-05 RX ADMIN — CARVEDILOL 6.25 MG: 6.25 TABLET, FILM COATED ORAL at 17:21

## 2019-01-05 RX ADMIN — FUROSEMIDE 60 MG: 40 TABLET ORAL at 09:06

## 2019-01-05 RX ADMIN — AMOXICILLIN AND CLAVULANATE POTASSIUM 800 MG: 400; 57 POWDER, FOR SUSPENSION ORAL at 20:50

## 2019-01-05 RX ADMIN — ACETAMINOPHEN 650 MG: 325 TABLET ORAL at 12:12

## 2019-01-05 RX ADMIN — FAMOTIDINE 20 MG: 20 TABLET ORAL at 20:51

## 2019-01-05 RX ADMIN — CIPROFLOXACIN 500 MG: KIT at 14:50

## 2019-01-05 RX ADMIN — ACETAMINOPHEN 650 MG: 325 TABLET ORAL at 20:50

## 2019-01-05 RX ADMIN — GABAPENTIN 600 MG: 300 CAPSULE ORAL at 17:21

## 2019-01-05 RX ADMIN — GABAPENTIN 600 MG: 300 CAPSULE ORAL at 09:06

## 2019-01-05 NOTE — ROUTINE PROCESS
Bedside and Verbal shift change report given to Martha Lee RN (oncoming nurse) by Anahi Jacobo RN (offgoing nurse). Report included the following information SBAR, Kardex, MAR and Recent Results. SITUATION:  
? Code Status: Full Code 
? Reason for Admission: Infected wound 
 
? Hospital day: 9 
? Problem List:  
   
Hospital Problems  Date Reviewed: 12/28/2018 Codes Class Noted POA Infected wound ICD-10-CM: T14. 8XXA, L08.9 ICD-9-CM: 958.3  12/27/2018 Unknown BACKGROUND:  
 Past Medical History:  
Past Medical History:  
Diagnosis Date  Acute paraplegia ([facility] [address] 4/20/2017  Benign hypertensive heart disease with systolic CHF, NYHA class 2 ([address] 9/5/2012  Biventricular implantable cardioverter-defibrillator in situ 04/28/2005 Upgraded to BiV AICD; gen change 4/2008; pocket revision 10/2009; Abdominal - done on 8/22/2012 by Dr. Ze Granger  Cardiac cath 08/15/1996 Patent coronaries. Elev LVEDP. EF 50-55%.  Cardiac echocardiogram 06/23/2015 Ltd study. EF 45-50%. Mild, diffuse hypk. Severe apical hypk. No mass or thrombus was clearly identified, although imaging was suboptimal.    
 Cardiac nuclear imaging test 06/19/2015 Fixed distal apical, distal septal defect more likely due to RV pacing than prior infarct. No ischemia. EF 46%. RWMA c/w RV pacing. Nondiagnostic EKG on pharm stress test.    
 Cardiovascular lower extremity venous duplex 09/04/2012 Acute, non-occlusive DVT in CFV on right. No DVT on left. No superficial thrombosis bilaterally.  Cardiovascular upper extremity venous duplex 08/27/2012 DVT in axillary vein on left. Left subclavian was not visualized.  Chronic anemia 9/5/2012  Chronic systolic heart failure ([address]  Decreased calculated glomerular filtration rate (GFR) 3/30/2017 Calculated GFR equivalent to that of CKD stage 3 = 30-59 ml/min  Diabetic neuropathy associated with type 2 diabetes mellitus ([address] 6/28/2011  Difficult airway for intubation 08/22/2012  
 see anesthesia airway note  Dyslipidemia 6/28/2011  Gout  History of complete heart block 6/28/2011  History of Coumadin therapy Anticoagulation for DVT of the LUE; Discontinued on 3/30/2017  History of deep venous thrombosis 9/5/2012 Left upper extremity  History of pyelonephritis 3/30/2017  Left bundle branch block (LBBB) on electrocardiogram 6/28/2011  Nonischemic cardiomyopathy ([facility] [address] 6/28/2011  Obesity (BMI 35.0-39.9 without comorbidity) 3/13/2017  Obstructive sleep apnea on CPAP 2/7/2012  Psoas abscess, right ([facility] [address] 4/20/2017  Psoas hematoma, right, secondary to anticoagulant therapy 3/30/2017  Type 2 diabetes mellitus with diabetic neuropathy ([facility] [address] 6/28/2011 Patient taking anticoagulants no ASSESSMENT:  
? Changes in Assessment Throughout Shift: none ? Patient has Central Line: no Reasons if yes:  
? Patient has Krueger Cath: yes Reasons if yes: chronic ? Last Vitals: 
  
Vitals:  
 01/04/19 1543 01/04/19 1848 01/04/19 2302 01/05/19 0236 BP: 129/80 126/68 111/61 106/66 Pulse: 77 83 72 73 Resp: 18 18 16 20 Temp: 97.4 °F (36.3 °C) 97.1 °F (36.2 °C) 96.9 °F (36.1 °C) 97.1 °F (36.2 °C) SpO2: 97% 98% 100% 99% Weight:      
Height:      
 
 
? IV and DRAINS (will only show if present) [REMOVED] Peripheral IV 12/27/18 Left Antecubital-Site Assessment: Clean, dry, & intact ? WOUND (if present) Wound Type:  foot Dressing present  yes Wound Concerns/Notes:  none ? PAIN Pain Assessment Pain Intensity 1: 2 (01/05/19 0358) Pain Location 1: Foot Pain Intervention(s) 1: Medication (see MAR) Patient Stated Pain Goal: 2 
o Interventions for Pain:  See mar 
o Intervention effective: yes 
o Time of last intervention: see mar  
o Reassessment Completed: yes ? Last 3 Weights: 
Last 3 Recorded Weights in this Encounter 01/02/19 0524 01/03/19 0541 01/04/19 1017 Weight: 111 kg (244 lb 11.4 oz) 112 kg (247 lb) 112 kg (247 lb) Weight change: ? INTAKE/OUPUT Current Shift: 01/04 1901 - 01/05 0700 In: 480 [P.O.:480] Out: 1700 [Urine:1700] Last three shifts: 01/03 0701 - 01/04 1900 In: 2750 [P.O.:2640; I.V.:110] Out: 5500 [Urine:5500] ? LAB RESULTS Recent Labs 01/03/19 
3578 WBC 4.4* HGB 10.3* HCT 32.2*  
 Recent Labs 01/03/19 
1871   
K 3.6 GLU 80 BUN 15  
CREA 1.29  
CA 9.0 MG 2.2 RECOMMENDATIONS AND DISCHARGE PLANNING 1. Pending tests/procedures/ Plan of Care or Other Needs: none 2. Discharge plan for patient and Needs/Barriers: Rehab? 3. Estimated Discharge Date: unknown Posted on Whiteboard in Patients Room: no   
 
4. The patient's care plan was reviewed with the oncoming nurse. \"HEALS\" SAFETY CHECK Fall Risk Total Score: 3 Safety Measures: Safety Measures: Bed/Chair-Wheels locked, Bed in low position, Call light within reach, Gripper socks, Side rails X 3 A safety check occurred in the patient's room between off going nurse and oncoming nurse listed above. The safety check included the below items Area Items H High Alert Medications ? Verify all high alert medication drips (heparin, PCA, etc.) E Equipment ? Suction is set up for ALL patients (with yanker) ? Red plugs utilized for all equipment (IV pumps, etc.) ? WOWs wiped down at end of shift. ? Room stocked with oxygen, suction, and other unit-specific supplies A Alarms ? Bed alarm is set for fall risk patients ? Ensure chair alarm is in place and activated if patient is up in a chair L Lines ? Check IV for any infiltration ? Krueger bag is empty if patient has a Krueger ? Tubing and IV bags are labeled Taylor Blase Safety ? Room is clean, patient is clean, and equipment is clean. ? Hallways are clear from equipment besides carts. ? Fall bracelet on for fall risk patients ? Ensure room is clear and free of clutter ? Suction is set up for ALL patients (with vania) ? Hallways are clear from equipment besides carts. ? Isolation precautions followed, supplies available outside room, sign posted Terence Babb RN

## 2019-01-05 NOTE — PROGRESS NOTES
Bedside and Verbal shift change report given to Bronson Arias RN (oncoming nurse) by Nisha Snider RN (offgoing nurse). Report included the following information SBAR, Kardex, Procedure Summary, Intake/Output, MAR and Recent Results.

## 2019-01-05 NOTE — PROGRESS NOTES
Hospitalist Progress Note Patient: Caitlyn Estrada Age: 76 y.o. : 1950 MR#: [id number] SSN: [id number] Date/Time: 2019 2:40 PM 
 
Subjective: She was admitted on 18 for right heel ulcer infection despite on wound vac with care. Outpt workup was suspicious for osteomyelitis. She was started on IV antibiotic with history of ESBL organisms. She underwent debridement of right heel with bone biopsy/culture, application of ACell graft (18). She tolerated well. She has been on liquid Augmentin, tolerated ok, has complaint of funny after taste but no rash. She was prescribed liquid ciprofloxacin but has not taking it today. Bone biopsy without osteomyelitis She has abdominal pain today and two bowel of loose stool. No fever, No increase in WBC. stil with nausea today. ROS: no fever/chills, no headache, no dizziness, no facial pain, no sinus congestion, No swallowing pain, No chest pain, no palpitation, no shortness of breath, ++ abd pain, 
++ diarrhea, no urinary complaint, no leg pain or swelling Assessment/Plan: 1. Chronic Right heel ulcer with infection, no evidence of osteomyelitis -s/p debridement of Right heel and bone biopsy (18) RIGHT CALCANEUS:  
     BONE WITH FOCAL CHRONIC INFLAMMATION AND FIBROSIS  
   -she continue Augmentin, ciprofloxacin Will see how she tolerate the new regiment as it will determine her outpt regimens - wound care per podiatry. - ID follows 
    - needs culture finalization 2. H/o ESBL in right foot ulcer 3. DMT2, on sliding scale insulin 4. Non-ischemic cardiomyopathy, EF 50% (2017), s/p BiV AICD 
   -no shortness of breath, has been maintain on her home dose lasix 
   - no decompensation evidence  
   - elevated ProBNP as previous. Continue lasix and carvedilol 5. HTN, on carvedilol and lasix 6. HALI, ? CPAP 7.   Chronic normocytic anemia, evidence of low iron, but elevated ferritin,  
    -she stated that she has sickle cell trait, could contribute to her anemia. - can hold off folate if she does not want to take it. 8.  Chronic urinary catheter, stable. 9.  H/o CVA 10.  Paraplegia with wheelchair dependent. Stable, fall precaution 11. Sinus allergy, given flonase and loratadine 12. Right hand/arm tremor, likely from her baclofen use, monitor now baclofen is prn only. 13.  Mild elevated creatinine, monitor for now, resolved. - resume 60mg of lasix at home 14. Abdominal pain with loose stool, likely due to antibiotics 
    - add lactobacillus 
    - CT abd/pelv today Full Code Additional Notes:   
 
Case discussed with:  [x]Patient  []Family  []Nursing  []Case Management DVT Prophylaxis:  [x]Lovenox  []Hep SQ  []SCDs  []Coumadin   []On Heparin gtt Signed By: Deborah Calhoun MD   
 2019 2:40 PM  
  
 
 
Objective:  
VS:  
Visit Vitals /71 (BP 1 Location: Left arm, BP Patient Position: At rest) Pulse 73 Temp 97.1 °F (36.2 °C) Resp 18 Ht 5' 7\" (1.702 m) Wt 112 kg (247 lb) LMP  (LMP Unknown) SpO2 98% BMI 38.69 kg/m² Tmax/24hrs: Temp (24hrs), Av.1 °F (36.2 °C), Min:96.9 °F (36.1 °C), Max:97.4 °F (36.3 °C) Intake/Output Summary (Last 24 hours) at 2019 1440 Last data filed at 2019 3054 Gross per 24 hour Intake 720 ml Output 2750 ml Net -2030 ml Tele: sinus tachycardia General:  Cooperative, No acute distress, speaks in full sentence while in bed HEENT: PERRL, EOMI, supple neck, no JVD, dry oral mucosa Cardiovascular: S1S2 regular, no rub/gallop Pulmonary: Air entry bilaterally, no wheezing, ++ crackle bilaterally GI:  Soft, ++RUQ tender, non distended, +bs, no guarding Extremities:  trace pedal edema, +distal pulses appreciated Neuro: AOx3, moving all upper extremities, lower extremities deficit. SKIN: right heel dressed, dry. Additional: Current Facility-Administered Medications Medication Dose Route Frequency  lactobacillus sp. 50 billion cpu (BIO-K PLUS) capsule 1 Cap  1 Cap Oral DAILY  ciprofloxacin (CIPRO) oral suspension 500 mg  500 mg Oral Q12H  furosemide (LASIX) tablet 60 mg  60 mg Oral DAILY  amoxicillin-clavulanate (AUGMENTIN) 400-57 mg/5 mL oral suspension 800 mg  800 mg Oral Q12H  diphenhydrAMINE (BENADRYL) capsule 25 mg  25 mg Oral Q6H PRN  
 ondansetron hcl (ZOFRAN) tablet 4 mg  4 mg Oral Q6H PRN  
 baclofen (LIORESAL) tablet 10 mg  10 mg Oral TID PRN  promethazine (PHENERGAN) 12.5 mg in 0.9% sodium chloride 50 mL IVPB  12.5 mg IntraVENous Q6H PRN  
 fluticasone (FLONASE) 50 mcg/actuation nasal spray 2 Spray  2 Spray Both Nostrils DAILY  loratadine (CLARITIN) tablet 10 mg  10 mg Oral DAILY  acetaminophen (TYLENOL) tablet 650 mg  650 mg Oral TID  sodium hypochlorite (QUARTER STRENGTH DAKIN'S) 0.125% irrigation (bottle)   Topical BID  carvedilol (COREG) tablet 6.25 mg  6.25 mg Oral BID  famotidine (PEPCID) tablet 20 mg  20 mg Oral QHS  gabapentin (NEURONTIN) capsule 600 mg  600 mg Oral BID  insulin glargine (LANTUS) injection 10 Units  10 Units SubCUTAneous QHS  insulin lispro (HUMALOG) injection 5 Units  0.05 Units/kg SubCUTAneous TID WITH MEALS  enoxaparin (LOVENOX) injection 40 mg  40 mg SubCUTAneous Q24H  
 acetaminophen (TYLENOL) tablet 650 mg  650 mg Oral Q4H PRN Labs:   
Recent Results (from the past 24 hour(s)) GLUCOSE, POC Collection Time: 01/04/19  4:29 PM  
Result Value Ref Range Glucose (POC) 116 (H) 70 - 110 mg/dL GLUCOSE, POC Collection Time: 01/04/19  6:51 PM  
Result Value Ref Range Glucose (POC) 140 (H) 70 - 110 mg/dL GLUCOSE, POC Collection Time: 01/04/19 10:08 PM  
Result Value Ref Range Glucose (POC) 145 (H) 70 - 110 mg/dL GLUCOSE, POC Collection Time: 01/05/19  6:03 AM  
Result Value Ref Range Glucose (POC) 105 70 - 110 mg/dL CBC W/O DIFF Collection Time: 01/05/19  6:33 AM  
Result Value Ref Range WBC 4.2 (L) 4.6 - 13.2 K/uL  
 RBC 3.85 (L) 4.20 - 5.30 M/uL  
 HGB 10.6 (L) 12.0 - 16.0 g/dL HCT 32.4 (L) 35.0 - 45.0 % MCV 84.2 74.0 - 97.0 FL  
 MCH 27.5 24.0 - 34.0 PG  
 MCHC 32.7 31.0 - 37.0 g/dL  
 RDW 17.2 (H) 11.6 - 14.5 % PLATELET 671 271 - 547 K/uL MPV 9.1 (L) 9.2 - 11.8 FL  
GLUCOSE, POC Collection Time: 01/05/19 11:17 AM  
Result Value Ref Range Glucose (POC) 100 70 - 110 mg/dL Xray of right foot: 12/27/18 IMPRESSION:   
  
1. Soft tissue defect in the posterior heel region. 2.  Foreshortened calcaneus with sharply marginated posterior calcaneal region. Query recent surgical intervention. Possible subtle cortical defect at the 
inferior margin.   Developing infectious process cannot be excluded.

## 2019-01-06 ENCOUNTER — APPOINTMENT (OUTPATIENT)
Dept: CT IMAGING | Age: 69
DRG: 629 | End: 2019-01-06
Attending: INTERNAL MEDICINE
Payer: COMMERCIAL

## 2019-01-06 LAB
ALBUMIN SERPL-MCNC: 2.8 G/DL (ref 3.4–5)
ALBUMIN/GLOB SERPL: 0.7 {RATIO} (ref 0.8–1.7)
ALP SERPL-CCNC: 113 U/L (ref 45–117)
ALT SERPL-CCNC: 33 U/L (ref 13–56)
ANION GAP SERPL CALC-SCNC: 6 MMOL/L (ref 3–18)
AST SERPL-CCNC: 28 U/L (ref 15–37)
BILIRUB SERPL-MCNC: 0.9 MG/DL (ref 0.2–1)
BUN SERPL-MCNC: 21 MG/DL (ref 7–18)
BUN/CREAT SERPL: 16 (ref 12–20)
CALCIUM SERPL-MCNC: 8.6 MG/DL (ref 8.5–10.1)
CHLORIDE SERPL-SCNC: 106 MMOL/L (ref 100–108)
CO2 SERPL-SCNC: 29 MMOL/L (ref 21–32)
CREAT SERPL-MCNC: 1.33 MG/DL (ref 0.6–1.3)
ERYTHROCYTE [DISTWIDTH] IN BLOOD BY AUTOMATED COUNT: 17.2 % (ref 11.6–14.5)
GLOBULIN SER CALC-MCNC: 4.1 G/DL (ref 2–4)
GLUCOSE BLD STRIP.AUTO-MCNC: 103 MG/DL (ref 70–110)
GLUCOSE BLD STRIP.AUTO-MCNC: 125 MG/DL (ref 70–110)
GLUCOSE BLD STRIP.AUTO-MCNC: 131 MG/DL (ref 70–110)
GLUCOSE BLD STRIP.AUTO-MCNC: 87 MG/DL (ref 70–110)
GLUCOSE SERPL-MCNC: 84 MG/DL (ref 74–99)
HCT VFR BLD AUTO: 31.1 % (ref 35–45)
HGB BLD-MCNC: 9.9 G/DL (ref 12–16)
MCH RBC QN AUTO: 26.8 PG (ref 24–34)
MCHC RBC AUTO-ENTMCNC: 31.8 G/DL (ref 31–37)
MCV RBC AUTO: 84.3 FL (ref 74–97)
PLATELET # BLD AUTO: 196 K/UL (ref 135–420)
PMV BLD AUTO: 9.3 FL (ref 9.2–11.8)
POTASSIUM SERPL-SCNC: 3.7 MMOL/L (ref 3.5–5.5)
PROT SERPL-MCNC: 6.9 G/DL (ref 6.4–8.2)
RBC # BLD AUTO: 3.69 M/UL (ref 4.2–5.3)
SODIUM SERPL-SCNC: 141 MMOL/L (ref 136–145)
WBC # BLD AUTO: 4 K/UL (ref 4.6–13.2)

## 2019-01-06 PROCEDURE — 74011250636 HC RX REV CODE- 250/636: Performed by: HOSPITALIST

## 2019-01-06 PROCEDURE — 74011636637 HC RX REV CODE- 636/637: Performed by: HOSPITALIST

## 2019-01-06 PROCEDURE — 85027 COMPLETE CBC AUTOMATED: CPT

## 2019-01-06 PROCEDURE — 74011250637 HC RX REV CODE- 250/637: Performed by: INTERNAL MEDICINE

## 2019-01-06 PROCEDURE — 74011636320 HC RX REV CODE- 636/320: Performed by: INTERNAL MEDICINE

## 2019-01-06 PROCEDURE — 74177 CT ABD & PELVIS W/CONTRAST: CPT

## 2019-01-06 PROCEDURE — 74011250637 HC RX REV CODE- 250/637: Performed by: HOSPITALIST

## 2019-01-06 PROCEDURE — 36415 COLL VENOUS BLD VENIPUNCTURE: CPT

## 2019-01-06 PROCEDURE — 82962 GLUCOSE BLOOD TEST: CPT

## 2019-01-06 PROCEDURE — 80053 COMPREHEN METABOLIC PANEL: CPT

## 2019-01-06 PROCEDURE — 65270000029 HC RM PRIVATE

## 2019-01-06 RX ADMIN — INSULIN GLARGINE 10 UNITS: 100 INJECTION, SOLUTION SUBCUTANEOUS at 21:31

## 2019-01-06 RX ADMIN — DIATRIZOATE MEGLUMINE AND DIATRIZOATE SODIUM 30 ML: 660; 100 LIQUID ORAL; RECTAL at 09:34

## 2019-01-06 RX ADMIN — LORATADINE 10 MG: 10 TABLET ORAL at 08:53

## 2019-01-06 RX ADMIN — ENOXAPARIN SODIUM 40 MG: 100 INJECTION SUBCUTANEOUS at 17:01

## 2019-01-06 RX ADMIN — CIPROFLOXACIN 500 MG: KIT at 10:10

## 2019-01-06 RX ADMIN — Medication 1 CAPSULE: at 08:50

## 2019-01-06 RX ADMIN — IOPAMIDOL 70 ML: 612 INJECTION, SOLUTION INTRAVENOUS at 12:33

## 2019-01-06 RX ADMIN — ACETAMINOPHEN 650 MG: 325 TABLET ORAL at 08:50

## 2019-01-06 RX ADMIN — ACETAMINOPHEN 650 MG: 325 TABLET ORAL at 20:28

## 2019-01-06 RX ADMIN — CARVEDILOL 6.25 MG: 6.25 TABLET, FILM COATED ORAL at 17:01

## 2019-01-06 RX ADMIN — AMOXICILLIN AND CLAVULANATE POTASSIUM 800 MG: 400; 57 POWDER, FOR SUSPENSION ORAL at 21:31

## 2019-01-06 RX ADMIN — GABAPENTIN 600 MG: 300 CAPSULE ORAL at 08:52

## 2019-01-06 RX ADMIN — AMOXICILLIN AND CLAVULANATE POTASSIUM 800 MG: 400; 57 POWDER, FOR SUSPENSION ORAL at 09:33

## 2019-01-06 RX ADMIN — CIPROFLOXACIN 500 MG: KIT at 22:31

## 2019-01-06 RX ADMIN — ACETAMINOPHEN 650 MG: 325 TABLET ORAL at 15:29

## 2019-01-06 RX ADMIN — FUROSEMIDE 60 MG: 40 TABLET ORAL at 08:53

## 2019-01-06 RX ADMIN — GABAPENTIN 600 MG: 300 CAPSULE ORAL at 17:01

## 2019-01-06 NOTE — PROGRESS NOTES
Hospitalist Progress Note Patient: Abhay Garcia Age: 76 y.o. : 1950 MR#: [id number] SSN: [id number] Date/Time: 2019 2:29 PM 
 
Subjective: She was admitted on 18 for right heel ulcer infection despite on wound vac with care. Outpt workup was suspicious for osteomyelitis. She was started on IV antibiotic with history of ESBL organisms. She underwent debridement of right heel with bone biopsy/culture, application of ACell graft (18). She tolerated well. Stated that she felt her throat was hard to swallow after taking the Augmentin, no rash. No fever. Off baclofen, her hands are better. Nausea improved. Biopsy of bone showed RIGHT CALCANEUS:  
BONE WITH FOCAL CHRONIC INFLAMMATION AND FIBROSIS Tissue culture has been pending. Dr. Dimitry Samayoa has switched her meropenem to liquid cipro with Augmentin. Patient alert awake and offers no new complaints No CP No NVD Assessment/Plan: 1. Chronic Right heel ulcer with infection, no evidence of osteomyelitis -s/p debridement of Right heel and bone biopsy RIGHT CALCANEUS:  
     BONE WITH FOCAL CHRONIC INFLAMMATION AND FIBROSIS  
   - continue Augmentin 2. H/o ESBL in right foot ulcer 3. DMT2, on sliding scale insulin 4. Non-ischemic cardiomyopathy, EF 50% (2017), s/p BiV AICD 
   -no shortness of breath, has been maintain on her home dose lasix 
   - no decompensation evidence  
   - elevated ProBNP as previous. Continue lasix and carvedilol 5. HTN, on carvedilol and lasix 6. HALI, ? CPAP 7. Chronic normocytic anemia, evidence of low iron, but elevated ferritin,  
    -she stated that she has sickle cell trait, could contribute to her anemia. - can hold off folate if she does not want to take it. 8.  Chronic urinary catheter, stable. 9.  H/o CVA 10.  Paraplegia with wheelchair dependent. Stable, fall precaution 11. Sinus allergy, given flonase and loratadine 12.  Right hand/arm tremor, likely from her baclofen use, monitor now baclofen is prn only. 13.  Mild elevated creatinine, monitor for now, resolved. - resume 60mg of lasix at home Full Code Additional Notes:   
 
Case discussed with:  [x]Patient  []Family  []Nursing  []Case Management DVT Prophylaxis:  [x]Lovenox  []Hep SQ  []SCDs  []Coumadin   []On Heparin gtt Signed By: Damaris Olmstead MD   
 2019 2:29 PM  
  
 
 
Objective:  
VS:  
Visit Vitals /78 (BP 1 Location: Right arm, BP Patient Position: At rest) Pulse 81 Temp 96.9 °F (36.1 °C) Resp 18 Ht 5' 7\" (1.702 m) Wt 112 kg (247 lb) LMP  (LMP Unknown) SpO2 100% BMI 38.69 kg/m² Tmax/24hrs: Temp (24hrs), Av.7 °F (36.5 °C), Min:96.9 °F (36.1 °C), Max:98.3 °F (36.8 °C) Intake/Output Summary (Last 24 hours) at 2019 1730 Last data filed at 2019 6774 Gross per 24 hour Intake 360 ml Output 2525 ml Net -2165 ml Tele: sinus tachycardia General:  Cooperative, No acute distress, speaks in full sentence while in bed HEENT: PERRL, EOMI, supple neck, no JVD, dry oral mucosa Cardiovascular: S1S2 regular, no rub/gallop Pulmonary: Air entry bilaterally, no wheezing, ++ crackle bilaterally GI:  Soft, non tender, non distended, +bs, no guarding Extremities:  trace pedal edema, +distal pulses appreciated Neuro: AOx3, moving all upper extremities, lower extremities deficit. SKIN: right heel dressed, dry. Additional:  
Current Facility-Administered Medications Medication Dose Route Frequency  lactobacillus sp. 50 billion cpu (BIO-K PLUS) capsule 1 Cap  1 Cap Oral DAILY  ciprofloxacin (CIPRO) oral suspension 500 mg  500 mg Oral Q12H  furosemide (LASIX) tablet 60 mg  60 mg Oral DAILY  amoxicillin-clavulanate (AUGMENTIN) 400-57 mg/5 mL oral suspension 800 mg  800 mg Oral Q12H  diphenhydrAMINE (BENADRYL) capsule 25 mg  25 mg Oral Q6H PRN  
  ondansetron hcl (ZOFRAN) tablet 4 mg  4 mg Oral Q6H PRN  
 baclofen (LIORESAL) tablet 10 mg  10 mg Oral TID PRN  promethazine (PHENERGAN) 12.5 mg in 0.9% sodium chloride 50 mL IVPB  12.5 mg IntraVENous Q6H PRN  
 fluticasone (FLONASE) 50 mcg/actuation nasal spray 2 Spray  2 Spray Both Nostrils DAILY  loratadine (CLARITIN) tablet 10 mg  10 mg Oral DAILY  acetaminophen (TYLENOL) tablet 650 mg  650 mg Oral TID  sodium hypochlorite (QUARTER STRENGTH DAKIN'S) 0.125% irrigation (bottle)   Topical BID  carvedilol (COREG) tablet 6.25 mg  6.25 mg Oral BID  famotidine (PEPCID) tablet 20 mg  20 mg Oral QHS  gabapentin (NEURONTIN) capsule 600 mg  600 mg Oral BID  insulin glargine (LANTUS) injection 10 Units  10 Units SubCUTAneous QHS  insulin lispro (HUMALOG) injection 5 Units  0.05 Units/kg SubCUTAneous TID WITH MEALS  enoxaparin (LOVENOX) injection 40 mg  40 mg SubCUTAneous Q24H  
 acetaminophen (TYLENOL) tablet 650 mg  650 mg Oral Q4H PRN Labs:   
Recent Results (from the past 24 hour(s)) GLUCOSE, POC Collection Time: 01/05/19  9:28 PM  
Result Value Ref Range Glucose (POC) 119 (H) 70 - 110 mg/dL CBC W/O DIFF Collection Time: 01/06/19  6:25 AM  
Result Value Ref Range WBC 4.0 (L) 4.6 - 13.2 K/uL  
 RBC 3.69 (L) 4.20 - 5.30 M/uL HGB 9.9 (L) 12.0 - 16.0 g/dL HCT 31.1 (L) 35.0 - 45.0 % MCV 84.3 74.0 - 97.0 FL  
 MCH 26.8 24.0 - 34.0 PG  
 MCHC 31.8 31.0 - 37.0 g/dL  
 RDW 17.2 (H) 11.6 - 14.5 % PLATELET 286 448 - 007 K/uL MPV 9.3 9.2 - 72.5 FL  
METABOLIC PANEL, COMPREHENSIVE Collection Time: 01/06/19  6:25 AM  
Result Value Ref Range Sodium 141 136 - 145 mmol/L Potassium 3.7 3.5 - 5.5 mmol/L Chloride 106 100 - 108 mmol/L  
 CO2 29 21 - 32 mmol/L Anion gap 6 3.0 - 18 mmol/L Glucose 84 74 - 99 mg/dL BUN 21 (H) 7.0 - 18 MG/DL  Creatinine 1.33 (H) 0.6 - 1.3 MG/DL  
 BUN/Creatinine ratio 16 12 - 20    
 GFR est AA 48 (L) >60 ml/min/1.73m2 GFR est non-AA 40 (L) >60 ml/min/1.73m2 Calcium 8.6 8.5 - 10.1 MG/DL Bilirubin, total 0.9 0.2 - 1.0 MG/DL  
 ALT (SGPT) 33 13 - 56 U/L  
 AST (SGOT) 28 15 - 37 U/L Alk. phosphatase 113 45 - 117 U/L Protein, total 6.9 6.4 - 8.2 g/dL Albumin 2.8 (L) 3.4 - 5.0 g/dL Globulin 4.1 (H) 2.0 - 4.0 g/dL A-G Ratio 0.7 (L) 0.8 - 1.7 GLUCOSE, POC Collection Time: 01/06/19  7:09 AM  
Result Value Ref Range Glucose (POC) 87 70 - 110 mg/dL GLUCOSE, POC Collection Time: 01/06/19 11:22 AM  
Result Value Ref Range Glucose (POC) 125 (H) 70 - 110 mg/dL GLUCOSE, POC Collection Time: 01/06/19  4:53 PM  
Result Value Ref Range Glucose (POC) 103 70 - 110 mg/dL Xray of right foot: 12/27/18 IMPRESSION:   
  
1. Soft tissue defect in the posterior heel region. 2.  Foreshortened calcaneus with sharply marginated posterior calcaneal region. Query recent surgical intervention. Possible subtle cortical defect at the 
inferior margin.   Developing infectious process cannot be excluded.

## 2019-01-07 LAB
GLUCOSE BLD STRIP.AUTO-MCNC: 118 MG/DL (ref 70–110)
GLUCOSE BLD STRIP.AUTO-MCNC: 118 MG/DL (ref 70–110)
GLUCOSE BLD STRIP.AUTO-MCNC: 144 MG/DL (ref 70–110)
GLUCOSE BLD STRIP.AUTO-MCNC: 153 MG/DL (ref 70–110)

## 2019-01-07 PROCEDURE — 74011250637 HC RX REV CODE- 250/637: Performed by: HOSPITALIST

## 2019-01-07 PROCEDURE — 74011250637 HC RX REV CODE- 250/637: Performed by: INTERNAL MEDICINE

## 2019-01-07 PROCEDURE — 82962 GLUCOSE BLOOD TEST: CPT

## 2019-01-07 PROCEDURE — 74011636637 HC RX REV CODE- 636/637: Performed by: HOSPITALIST

## 2019-01-07 PROCEDURE — 74011250636 HC RX REV CODE- 250/636: Performed by: HOSPITALIST

## 2019-01-07 PROCEDURE — 65270000029 HC RM PRIVATE

## 2019-01-07 RX ORDER — BISACODYL 5 MG
10 TABLET, DELAYED RELEASE (ENTERIC COATED) ORAL DAILY PRN
Status: DISCONTINUED | OUTPATIENT
Start: 2019-01-07 | End: 2019-01-08 | Stop reason: HOSPADM

## 2019-01-07 RX ADMIN — CARVEDILOL 6.25 MG: 6.25 TABLET, FILM COATED ORAL at 17:20

## 2019-01-07 RX ADMIN — Medication 1 CAPSULE: at 08:33

## 2019-01-07 RX ADMIN — CIPROFLOXACIN 500 MG: KIT at 09:05

## 2019-01-07 RX ADMIN — CARVEDILOL 6.25 MG: 6.25 TABLET, FILM COATED ORAL at 08:33

## 2019-01-07 RX ADMIN — ACETAMINOPHEN 650 MG: 325 TABLET ORAL at 08:32

## 2019-01-07 RX ADMIN — BACLOFEN 10 MG: 10 TABLET ORAL at 21:04

## 2019-01-07 RX ADMIN — AMOXICILLIN AND CLAVULANATE POTASSIUM 800 MG: 400; 57 POWDER, FOR SUSPENSION ORAL at 21:05

## 2019-01-07 RX ADMIN — ACETAMINOPHEN 650 MG: 325 TABLET ORAL at 17:28

## 2019-01-07 RX ADMIN — AMOXICILLIN AND CLAVULANATE POTASSIUM 800 MG: 400; 57 POWDER, FOR SUSPENSION ORAL at 09:05

## 2019-01-07 RX ADMIN — LORATADINE 10 MG: 10 TABLET ORAL at 08:32

## 2019-01-07 RX ADMIN — ACETAMINOPHEN 650 MG: 325 TABLET ORAL at 21:04

## 2019-01-07 RX ADMIN — ENOXAPARIN SODIUM 40 MG: 100 INJECTION SUBCUTANEOUS at 17:21

## 2019-01-07 RX ADMIN — GABAPENTIN 600 MG: 300 CAPSULE ORAL at 17:20

## 2019-01-07 RX ADMIN — INSULIN GLARGINE 10 UNITS: 100 INJECTION, SOLUTION SUBCUTANEOUS at 21:07

## 2019-01-07 RX ADMIN — GABAPENTIN 600 MG: 300 CAPSULE ORAL at 08:33

## 2019-01-07 RX ADMIN — FUROSEMIDE 60 MG: 40 TABLET ORAL at 08:33

## 2019-01-07 RX ADMIN — CIPROFLOXACIN 500 MG: KIT at 21:05

## 2019-01-07 RX ADMIN — ACETAMINOPHEN 650 MG: 325 TABLET ORAL at 13:16

## 2019-01-07 NOTE — ROUTINE PROCESS
Bedside and Verbal shift change report given to Analy Armstrong 364 (oncoming nurse) by Rashi Winkler RN (offgoing nurse). Report included the following information SBAR, Kardex, Intake/Output, MAR and Recent Results.

## 2019-01-07 NOTE — PROGRESS NOTES
Seen at bedside awake and alert. Inspected wound. Clean and no signs acute infection. Graft intact. Agree with ID. Cleared for discharge.

## 2019-01-07 NOTE — PROGRESS NOTES
Infectious Disease Progress Note Requested by: Dr. Shira Ryder Reason: right heel infected ulcer Current abx Prior abx Ciprofloxacin since 1/5/19 Amoxicillin/clavulanate since 1/3/19 linezolid 12/27-12/28/18 Meropenem 12/28/18-1/4/19 Lines:  
 
 
Assessment : 
 
76 y. o. female who has a h/o  uncontrolled DM2 (last hgbA1C 8.7 on 10/18/18), HTN, CHF, CAD and paraplegia who presented to ed on 10/17/18 due to worsening wound on her right heel.  
  
   
 hospitalization (4/20/17-5/15/17) for Sepsis  secondary to group B streptococcus bloodstream infection (positive blood cultures 4/20, negative blood cultures 4/21). Most likely source of bloodstream infection is infected right psoas hematoma/abscess. 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    
  
bilateral LE dvt - s/p IVC filter placement 5/11 
  
Hospitalization 8/2018 for infected right heel ulcer, acute osteomyelitis right calcaneum. S/p Radical debridement of necrotic tissue and debridement of heel bone.  Wound irrigation, application of wound VAC on 8/20/18. Intra op cultures negative. CRP 2.4 on 8/21/18 
  
Hospitalization 10/2018 for acute on chronic osteomyelitis right calcaneum. S/p I&D. Intra op pathology confirms diagnosis of acute on chronic osteomyelitis. Intra op cultures 10/19 (off antibiotics)- esbl klebsiella, rare enterococcus faecalis, peptostreptococcus.  
  
Esr: 62 on 10/18. Crp: 2.4 on 10/19 
  
Wound cultures 10/17- MRSA, esbl klebsiella, enterococcus faecalis, diphtheroids 
  
Urine cultures 10/17 reveals esbl e.coli, klebsiella likely colonizer. No s/s to suggest cystitis. Now with non healing right heel ulcer. Highly complex clinical picture. Clinical presentation c/w acute cellulitis right foot/leg likely due to non healing right heel ulcer. S/p I&D 12/31- intra op cultures negative. Intra op findings d/w dr. Nereida Ryan. No purulence. No evidence of chronic osteomyelitis. Histology of calcaneum suggestive of chronic inflammation. Looking at the above information, I am concerned that patient's cellulitis was likely due to acute on chronic osteomyelitis right calcaneum - breakthrough infection after discontinuation of antibiotics. Recurrence despite recent prolonged iv abx makes abx management very complicated. Due to neuropathy and delayed healing, she will likely need very prolonged abx for cure. This can be best achieved with prolonged po abx.  
  
 
abx management highly complicated due to h/o multiple abx allergies including ampicillin/vancomycin. Has tolerated ceftriaxone in the past. Significant itching with vancomycin, quinolones. Patient is willing to try liquid antibiotics to see if she can tolerate it. Her side effects on most abx include nausea, itching sometimes controlled with benadryl. resolved edema right leg/right foot 
  
Recommendations: 
   
1. cont liquid po ciprofloxacin ,  po liquid augmentin (for enterococcus) for 6-12 weeks based on clinical response 2. f/u podiatry recommendations 3. Patient should follow up with dr. Claire Dhalwial ([facility] outpatient infectious disease physician - [phone]) after 3 weeks.  
Brooks Hassan to d/c patient if ok with podiatrist.  
  
 Advance Care planning: full code: discussed  with patient/surrogate decision maker:Tomi Gonzalez: [phone] 
[facility] plan was discussed in details with patient, dr. Nereida Ryan. Please call me if any further questions or concerns. Will continue to participate in the care of this patient. 
  
 
subjective: 
 
Had some scratchy throat with augmentin. No major side effects.   
Patient denies headaches, visual disturbances, sore throat, runny nose, earaches, cp, sob, cough, abdominal pain, diarrhea, burning micturition, or weakness in extremities. she denies back pain/flank pain.    
  
  
 
 
  
 Medication List  
  
ASK your doctor about these medications   
baclofen 10 mg tablet Commonly known as:  LIORESAL Take 1 Tab by mouth three (3) times daily. carvedilol 6.25 mg tablet Commonly known as:  Wendall Lundborg Take 1 Tab by mouth two (2) times a day. cholecalciferol (VITAMIN D3) 5,000 unit Tab tablet Commonly known as:  VITAMIN D3 
  
famotidine 20 mg tablet Commonly known as:  PEPCID Take 1 Tab by mouth nightly. folic acid 1 mg tablet Commonly known as:  [employer] Take 1 Tab by mouth daily. furosemide 40 mg tablet Commonly known as:  LASIX 
1 tab daily 
  
gabapentin 300 mg capsule Commonly known as:  NEURONTIN Take 2 Caps by mouth two (2) times a day. Indications: NEUROPATHIC PAIN 
  
insulin glargine 100 unit/mL injection Commonly known as:  LANTUS U-100 INSULIN 
5 Units by SubCUTAneous route nightly. Indications: type 2 diabetes mellitus 
  
insulin lispro 100 unit/mL injection Commonly known as:  HUMALOG See sliding scale 
  
metFORMIN  mg tablet Commonly known as:  GLUCOPHAGE XR 
  
ondansetron 4 mg disintegrating tablet Commonly known as:  ZOFRAN ODT 
1 Tab by SubLINGual route every six (6) hours as needed. Current Facility-Administered Medications Medication Dose Route Frequency  lactobacillus sp. 50 billion cpu (BIO-K PLUS) capsule 1 Cap  1 Cap Oral DAILY  ciprofloxacin (CIPRO) oral suspension 500 mg  500 mg Oral Q12H  furosemide (LASIX) tablet 60 mg  60 mg Oral DAILY  amoxicillin-clavulanate (AUGMENTIN) 400-57 mg/5 mL oral suspension 800 mg  800 mg Oral Q12H  diphenhydrAMINE (BENADRYL) capsule 25 mg  25 mg Oral Q6H PRN  
 ondansetron hcl (ZOFRAN) tablet 4 mg  4 mg Oral Q6H PRN  
 baclofen (LIORESAL) tablet 10 mg  10 mg Oral TID PRN  promethazine (PHENERGAN) 12.5 mg in 0.9% sodium chloride 50 mL IVPB  12.5 mg IntraVENous Q6H PRN  
 fluticasone (FLONASE) 50 mcg/actuation nasal spray 2 Spray  2 Spray Both Nostrils DAILY  loratadine (CLARITIN) tablet 10 mg  10 mg Oral DAILY  acetaminophen (TYLENOL) tablet 650 mg  650 mg Oral TID  sodium hypochlorite (QUARTER STRENGTH DAKIN'S) 0.125% irrigation (bottle)   Topical BID  carvedilol (COREG) tablet 6.25 mg  6.25 mg Oral BID  famotidine (PEPCID) tablet 20 mg  20 mg Oral QHS  gabapentin (NEURONTIN) capsule 600 mg  600 mg Oral BID  insulin glargine (LANTUS) injection 10 Units  10 Units SubCUTAneous QHS  insulin lispro (HUMALOG) injection 5 Units  0.05 Units/kg SubCUTAneous TID WITH MEALS  enoxaparin (LOVENOX) injection 40 mg  40 mg SubCUTAneous Q24H  
 acetaminophen (TYLENOL) tablet 650 mg  650 mg Oral Q4H PRN Allergies: Vancomycin; Ampicillin; Bactrim [sulfamethoxazole-trimethoprim]; Blueberry; Ciprofloxacin; Codeine; Crestor [rosuvastatin]; Darvocet a500 [propoxyphene n-acetaminophen]; Demerol [meperidine]; Levaquin [levofloxacin]; Lipitor [atorvastatin]; Magnesium oxide; Minocin [minocycline]; Pcn [penicillins]; Pravachol [pravastatin]; Shellfish derived; Sulfa (sulfonamide antibiotics); Ultracet [tramadol-acetaminophen]; Vicodin [hydrocodone-acetaminophen]; Vytorin 10-10 [ezetimibe-simvastatin]; and Percodan [oxycodone hcl-oxycodone-asa] Temp (24hrs), Av.3 °F (36.3 °C), Min:97 °F (36.1 °C), Max:97.7 °F (36.5 °C) Visit Vitals /75 (BP 1 Location: Right arm, BP Patient Position: At rest) Pulse 77 Temp 97 °F (36.1 °C) Resp 18 Ht 5' 7\" (1.702 m) Wt 112 kg (247 lb) LMP  (LMP Unknown) SpO2 100% BMI 38.69 kg/m² ROS: 12 point ROS obtained in details. Pertinent positives as mentioned in HPI,  
otherwise negative Physical Exam: 
 
General: Well developed, well nourished female laying on the bed, AAOx3 NAD 
   
General:  awake alert and oriented HEENT:  Normocephalic, atraumatic, PERRL, EOMI, no scleral icterus or pallor; no conjunctival hemmohage; nasal and oral mucous are moist and without evidence of lesions. Neck supple, no bruits. Lymph Nodes:  no cervical, axillary or inguinal adenopathy Lungs:  non-labored, bilaterally clear to auscultation- no crackles wheezes rales or rhonchi Heart:  s1 and s2 irregular; no rubs or gallops, no edema, + pedal pulses Abdomen: soft, non-distended, active bowel sounds, no hepatomegaly, no splenomegaly. no tenderness over the left abdominal pacemaker, no overlying erythema or fluctuance,no tenderness Genitourinary: deferred Extremities:  no clubbing, cyanosis; resolved edema right leg/foot, right foot dressing not opened Neurologic:  No gross focal sensory abnormalities; 5/5 muscle strength to upper extremities. 1/5 strength in lower extremities. Cranial nerves intact Skin:  Surgical scars abdomen, left neck well healed Back: no paraspinal muscle guarding or rigidity, no spinal or paraspinal tenderness Psychiatric:  No suicidal or homicidal ideations, appropriate mood and affect  
   
  
 
 
 
 
Labs: Results:  
Chemistry Recent Labs 01/06/19 
0474 GLU 84   
K 3.7  CO2 29 BUN 21* CREA 1.33* CA 8.6 AGAP 6  
BUCR 16  
  
TP 6.9 ALB 2.8*  
GLOB 4.1* AGRAT 0.7* CBC w/Diff Recent Labs 01/06/19 
4500 01/05/19 
7729 WBC 4.0* 4.2*  
RBC 3.69* 3.85* HGB 9.9* 10.6* HCT 31.1* 32.4*  
 195 Microbiology No results for input(s): CULT in the last 72 hours. RADIOLOGY: 
 
All available imaging studies/reports in [facility] for this admission were reviewed Dr. Rosy Moreno, Infectious Disease Specialist 
[phone] January 7, 2019 11:44 AM

## 2019-01-07 NOTE — PROGRESS NOTES
Problem: Pressure Injury - Risk of 
Goal: *Prevention of pressure injury Document Surjit Scale and appropriate interventions in the flowsheet. Outcome: Progressing Towards Goal 
Pressure Injury Interventions: 
Sensory Interventions: Pressure redistribution bed/mattress (bed type) Moisture Interventions: Absorbent underpads Activity Interventions: Pressure redistribution bed/mattress(bed type) Mobility Interventions: Pressure redistribution bed/mattress (bed type) Nutrition Interventions: Document food/fluid/supplement intake Friction and Shear Interventions: Lift sheet, Foam dressings/transparent film/skin sealants

## 2019-01-07 NOTE — PROGRESS NOTES
Hospitalist Progress Note Patient: Kalin Boyer Age: 76 y.o. : 1950 MR#: [id number] SSN: [id number] Date/Time: 2019 2:29 PM 
 
Subjective: She was admitted on 18 for right heel ulcer infection despite on wound vac with care. Outpt workup was suspicious for osteomyelitis. She was started on IV antibiotic with history of ESBL organisms. She underwent debridement of right heel with bone biopsy/culture, application of ACell graft (18). She tolerated well. Stated that she felt her throat was hard to swallow after taking the Augmentin, no rash. No fever. Off baclofen, her hands are better. Nausea improved. Biopsy of bone showed RIGHT CALCANEUS:  
BONE WITH FOCAL CHRONIC INFLAMMATION AND FIBROSIS Tissue culture has been pending. Dr. Gabriela Don has switched her meropenem to liquid cipro with Augmentin. Stable No CP No SOB No NVD Assessment/Plan: 1. Chronic Right heel ulcer with infection, no evidence of osteomyelitis -s/p debridement of Right heel and bone biopsy RIGHT CALCANEUS:  
     BONE WITH FOCAL CHRONIC INFLAMMATION AND FIBROSIS  
   - continue Augmentin and Cipro for 6 weeks Follow up with out patient ID 2. H/o ESBL in right foot ulcer 3. DMT2, on sliding scale insulin 4. Non-ischemic cardiomyopathy, EF 50% (2017), s/p BiV AICD 
   -no shortness of breath, has been maintain on her home dose lasix 
   - no decompensation evidence  
   - elevated ProBNP as previous. Continue lasix and carvedilol 5. HTN, on carvedilol and lasix 6. HALI, ? CPAP 7. Chronic normocytic anemia, evidence of low iron, but elevated ferritin,  
    -she stated that she has sickle cell trait, could contribute to her anemia. - can hold off folate if she does not want to take it. 8.  Chronic urinary catheter, stable. 9.  H/o CVA 10.  Paraplegia with wheelchair dependent. Stable, fall precaution 11.  Sinus allergy, given flonase and loratadine 12. Right hand/arm tremor, likely from her baclofen use, monitor now baclofen is prn only. 13.  Mild elevated creatinine, monitor for now, resolved. - resume 60mg of lasix at home Full Code Additional Notes:   
 
Case discussed with:  [x]Patient  []Family  []Nursing  []Case Management DVT Prophylaxis:  [x]Lovenox  []Hep SQ  []SCDs  []Coumadin   []On Heparin gtt Signed By: April Rojas MD   
 2019 2:29 PM  
  
 
 
Objective:  
VS:  
Visit Vitals /75 (BP 1 Location: Right arm, BP Patient Position: At rest) Pulse 75 Temp 97.4 °F (36.3 °C) Resp 17 Ht 5' 7\" (1.702 m) Wt 111.6 kg (246 lb) LMP  (LMP Unknown) SpO2 100% BMI 38.53 kg/m² Tmax/24hrs: Temp (24hrs), Av.4 °F (36.3 °C), Min:97 °F (36.1 °C), Max:97.7 °F (36.5 °C) Intake/Output Summary (Last 24 hours) at 2019 1702 Last data filed at 2019 1550 Gross per 24 hour Intake 360 ml Output 2801 ml Net -2441 ml Tele: sinus tachycardia General:  Cooperative, No acute distress, speaks in full sentence while in bed HEENT: PERRL, EOMI, supple neck, no JVD, dry oral mucosa Cardiovascular: S1S2 regular, no rub/gallop Pulmonary: Air entry bilaterally, no wheezing, ++ crackle bilaterally GI:  Soft, non tender, non distended, +bs, no guarding Extremities:  trace pedal edema, +distal pulses appreciated Neuro: AOx3, moving all upper extremities, lower extremities deficit. SKIN: right heel dressed, dry. Additional:  
Current Facility-Administered Medications Medication Dose Route Frequency  lactobacillus sp. 50 billion cpu (BIO-K PLUS) capsule 1 Cap  1 Cap Oral DAILY  ciprofloxacin (CIPRO) oral suspension 500 mg  500 mg Oral Q12H  furosemide (LASIX) tablet 60 mg  60 mg Oral DAILY  amoxicillin-clavulanate (AUGMENTIN) 400-57 mg/5 mL oral suspension 800 mg  800 mg Oral Q12H  diphenhydrAMINE (BENADRYL) capsule 25 mg  25 mg Oral Q6H PRN  
 ondansetron hcl (ZOFRAN) tablet 4 mg  4 mg Oral Q6H PRN  
 baclofen (LIORESAL) tablet 10 mg  10 mg Oral TID PRN  promethazine (PHENERGAN) 12.5 mg in 0.9% sodium chloride 50 mL IVPB  12.5 mg IntraVENous Q6H PRN  
 fluticasone (FLONASE) 50 mcg/actuation nasal spray 2 Spray  2 Spray Both Nostrils DAILY  loratadine (CLARITIN) tablet 10 mg  10 mg Oral DAILY  acetaminophen (TYLENOL) tablet 650 mg  650 mg Oral TID  sodium hypochlorite (QUARTER STRENGTH DAKIN'S) 0.125% irrigation (bottle)   Topical BID  carvedilol (COREG) tablet 6.25 mg  6.25 mg Oral BID  famotidine (PEPCID) tablet 20 mg  20 mg Oral QHS  gabapentin (NEURONTIN) capsule 600 mg  600 mg Oral BID  insulin glargine (LANTUS) injection 10 Units  10 Units SubCUTAneous QHS  insulin lispro (HUMALOG) injection 5 Units  0.05 Units/kg SubCUTAneous TID WITH MEALS  enoxaparin (LOVENOX) injection 40 mg  40 mg SubCUTAneous Q24H  
 acetaminophen (TYLENOL) tablet 650 mg  650 mg Oral Q4H PRN Labs:   
Recent Results (from the past 24 hour(s)) GLUCOSE, POC Collection Time: 01/06/19  9:34 PM  
Result Value Ref Range Glucose (POC) 131 (H) 70 - 110 mg/dL GLUCOSE, POC Collection Time: 01/07/19  5:50 AM  
Result Value Ref Range Glucose (POC) 118 (H) 70 - 110 mg/dL GLUCOSE, POC Collection Time: 01/07/19 11:33 AM  
Result Value Ref Range Glucose (POC) 144 (H) 70 - 110 mg/dL GLUCOSE, POC Collection Time: 01/07/19  3:39 PM  
Result Value Ref Range Glucose (POC) 118 (H) 70 - 110 mg/dL Xray of right foot: 12/27/18 IMPRESSION:   
  
1. Soft tissue defect in the posterior heel region. 2.  Foreshortened calcaneus with sharply marginated posterior calcaneal region. Query recent surgical intervention. Possible subtle cortical defect at the 
inferior margin.   Developing infectious process cannot be excluded.

## 2019-01-07 NOTE — PROGRESS NOTES
conducted a Follow up consultation and Spiritual Assessment for Anitha Garay, who is a 76 y.o.,female. The  provided the following Interventions: 
Continued the relationship of care and support. Listened empathically. Offered assurance of continued prayer on patients behalf. Chart reviewed. The following outcomes were achieved: 
Patient expressed gratitude for 's visit. Assessment: 
There are no further spiritual or Temple issues which require Spiritual Care Services interventions at this time. Plan: 
Chaplains will continue to follow and will provide pastoral care on an as needed/requested basis.  recommends bedside caregivers page  on duty if patient shows signs of acute spiritual or emotional distress. santhosh Cedeno [facility]  
[phone]

## 2019-01-07 NOTE — DIABETES MGMT
NUTRITIONAL ASSESSMENT GLYCEMIC CONTROL/ PLAN OF CARE Marcella Finnegan           76 y.o.           12/27/2018 1. Heel ulceration, right, with unspecified severity ([address] 2. Wound infection INTERVENTIONS/PLAN:  
Consider discontinuing prandial Lispro insulin, pt has not been receiving prandial coverage and glucose within target range. ASSESSMENT:  
Pt is a 76year old female with a past medical history significant for diabetes, cardiomyopathy, CVA, and heel ulcer. Blood glucose currently within targets. Pt tolerating diet. Diabetes Management: Recent blood glucose:   
1/6/2019 16:53 1/6/2019 21:34 1/7/2019 05:50 1/7/2019 11:33  
103 131 (H) 118 (H) 144 (H) Within target range (non-ICU: <140; ICU<180): [x] Yes   []  No 
 
Current Insulin regimen:  
Lantus insulin 10 units every bedtime Lispro insulin 5 units TID with meals Home medication/insulin regimen:  Lantus 20 units every bedtime, humalog 4 units 3 times daily with meals and metformin 500 mg daily. HbA1c: 8.7% Adequate glycemic control PTA:  [] Yes  [x] No 
 
SUBJECTIVE/OBJECTIVE:  
 
Diet: Diabetic consistent carbohydrate Patient Vitals for the past 100 hrs: 
 % Diet Eaten 01/07/19 0849 75 % 01/06/19 0939 100 % 01/04/19 1848 75 % 01/04/19 1412 75 % 01/04/19 1017 100 % 01/03/19 1859 100 % 01/03/19 1620 0 % 01/03/19 1145 0 % Medications: [x] Reviewed Most Recent POC Glucose:  
Recent Labs 01/06/19 
9269 GLU 84 Labs:  
Lab Results Component Value Date/Time Hemoglobin A1c 8.7 (H) 10/18/2018 12:50 AM  
 
Lab Results Component Value Date/Time  Sodium 141 01/06/2019 06:25 AM  
 Potassium 3.7 01/06/2019 06:25 AM  
 Chloride 106 01/06/2019 06:25 AM  
 CO2 29 01/06/2019 06:25 AM  
 Anion gap 6 01/06/2019 06:25 AM  
 Glucose 84 01/06/2019 06:25 AM  
 BUN 21 (H) 01/06/2019 06:25 AM  
 Creatinine 1.33 (H) 01/06/2019 06:25 AM  
 Calcium 8.6 01/06/2019 06:25 AM  
 Magnesium 2.2 01/03/2019 04:42 AM  
 Phosphorus 4.0 05/15/2017 06:04 AM  
 Albumin 2.8 (L) 01/06/2019 06:25 AM  
 
Anthropometrics:   BMI (calculated): 38.7 Wt Readings from Last 1 Encounters:  
01/04/19 112 kg (247 lb) Ht Readings from Last 1 Encounters:  
12/29/18 5' 7\" (1.702 m) Estimated Nutrition Needs:   4124-0286 Kcal/day,  grams protein/day Based on:   [x]  Actual BW    []  IBW   []  Adjusted BW   
 
Nutrition Diagnoses: Altered nutrition related lab value related to diabetes as evidenced by Hemoglobin A1c of 8.7% Nutrition Interventions: coordination of care Goal: Blood glucose will remain within target range of  mg/dL by 1/10/19 Nutrition Monitoring and Evaluation []     Monitor po intake on meal rounds 
[x]     Continue inpatient monitoring and intervention 
[]     Other: 
 
Gallito Talley RD, CDE [facility] [phone]

## 2019-01-08 VITALS
WEIGHT: 246 LBS | OXYGEN SATURATION: 100 % | BODY MASS INDEX: 38.61 KG/M2 | HEART RATE: 82 BPM | HEIGHT: 67 IN | SYSTOLIC BLOOD PRESSURE: 142 MMHG | TEMPERATURE: 97.2 F | RESPIRATION RATE: 17 BRPM | DIASTOLIC BLOOD PRESSURE: 76 MMHG

## 2019-01-08 LAB
GLUCOSE BLD STRIP.AUTO-MCNC: 113 MG/DL (ref 70–110)
GLUCOSE BLD STRIP.AUTO-MCNC: 124 MG/DL (ref 70–110)

## 2019-01-08 PROCEDURE — 74011250637 HC RX REV CODE- 250/637: Performed by: INTERNAL MEDICINE

## 2019-01-08 PROCEDURE — 74011636637 HC RX REV CODE- 636/637: Performed by: HOSPITALIST

## 2019-01-08 PROCEDURE — 82962 GLUCOSE BLOOD TEST: CPT

## 2019-01-08 PROCEDURE — 74011250637 HC RX REV CODE- 250/637: Performed by: HOSPITALIST

## 2019-01-08 RX ORDER — FLUTICASONE PROPIONATE 50 MCG
SPRAY, SUSPENSION (ML) NASAL
Qty: 1 BOTTLE | Refills: 0 | Status: ON HOLD | OUTPATIENT
Start: 2019-01-09 | End: 2019-12-12

## 2019-01-08 RX ORDER — AMOXICILLIN AND CLAVULANATE POTASSIUM 400; 57 MG/5ML; MG/5ML
800 POWDER, FOR SUSPENSION ORAL EVERY 12 HOURS
Qty: 100 ML | Refills: 1 | Status: SHIPPED | OUTPATIENT
Start: 2019-01-08 | End: 2019-01-22 | Stop reason: ALTCHOICE

## 2019-01-08 RX ORDER — DIPHENHYDRAMINE HCL 25 MG
25 CAPSULE ORAL
Qty: 30 CAP | Refills: 0 | Status: SHIPPED | OUTPATIENT
Start: 2019-01-08 | End: 2019-01-18

## 2019-01-08 RX ORDER — CIPROFLOXACIN 500 MG/5ML
500 KIT ORAL EVERY 12 HOURS
Qty: 100 ML | Refills: 0 | Status: SHIPPED | OUTPATIENT
Start: 2019-01-08 | End: 2019-02-07 | Stop reason: ALTCHOICE

## 2019-01-08 RX ADMIN — FLUTICASONE PROPIONATE 2 SPRAY: 50 SPRAY, METERED NASAL at 08:46

## 2019-01-08 RX ADMIN — CARVEDILOL 6.25 MG: 6.25 TABLET, FILM COATED ORAL at 08:47

## 2019-01-08 RX ADMIN — Medication 1 CAPSULE: at 08:46

## 2019-01-08 RX ADMIN — GABAPENTIN 600 MG: 300 CAPSULE ORAL at 08:46

## 2019-01-08 RX ADMIN — ACETAMINOPHEN 650 MG: 325 TABLET ORAL at 08:47

## 2019-01-08 RX ADMIN — LORATADINE 10 MG: 10 TABLET ORAL at 08:46

## 2019-01-08 RX ADMIN — CIPROFLOXACIN 500 MG: KIT at 11:02

## 2019-01-08 RX ADMIN — FUROSEMIDE 60 MG: 40 TABLET ORAL at 08:47

## 2019-01-08 RX ADMIN — AMOXICILLIN AND CLAVULANATE POTASSIUM 800 MG: 400; 57 POWDER, FOR SUSPENSION ORAL at 11:01

## 2019-01-08 NOTE — DISCHARGE SUMMARY
Discharge Summary     Patient ID:  Antoine Angulo  [id number]  76 y.o.  1950  Body mass index is 38.53 kg/m². PCP on record: Mehnaz Sky MD    Admit date: 12/27/2018  Discharge date and time: 1/8/2019    Discharge Diagnoses:                                           1 ) Chronic right Heel ulcer -   2) DM2   3 ) Chronic indwelling cath   4 ) HTN   5 ) Non-ischemic cardiomyopathy, EF 50% (July/2017), s/p BiV AICD    Patient is chronic sebastian bed bound due to Bilateral LL paraplegia/Weakness - h/o Psoas abscess and its complications - regaining some LL movement with PT /OT       Consults: ID and [facility] Course by problems:  HPI per admitting MD :  Alana Wahl is a 76 y.o. female who has diabetic, cardiomyopathy s/p ICD (non ischemic), CVA, urine incontinence on chronic perez, long standing right heel ulcer. She has had the ulcer for more than a year with no complete healing. She states she has been doing well and the ulcer had been clean about 2 weeks ago. She was told that she was a candidate for grafting. However in the last 10 days she has noted increased discharge and smell from the ulcer area. She is a patient of Dr. Fabio Wells and she has had cleaning done and a wound vac done. She comes to hospital for increasing pain and suspicion of osteomyelitis based on outpatient work up. Pain is about 8/10.      Has chills, but no fever. She has a hx of treatment for ESBL organisms  She has an extensive list of allergies.  \"    Admitted with right heel ulcer and local infection   - Suspected osteomyelitis   - Biopsy - No OM   - Now placed on long term po antibiotics - Cipro and Augmentin per ID - Dr Ronda Severance - Listed allergy but patient is tolerating it well , more than 2 doses so far and no adverse reactions , Benadryl prn if develops itching   - continue antibiotics for 6 weeks - out patient follow up with ID       Patient seen and examined by me on discharge day.  Pertinent Findings:  Patient is Alert Awake and oriented   HEENT - NAD    RS - Clear , no rales no rhonchi   CVS - regular rhythm and rate acceptable    abd - benign, BS present , no Distension   EXT - no edema , no calf tenderness   Neuro - alert and awake , grossly motor and sensory intact       Significant Diagnostic Studies:  *SURGICAL PATHOLOGY REPORT* * *  ==========================================================================        Patient: Kathy Goyal           Specimen #:    Age:  1950 (Age: 76)              Date of Procedure: 12/31/2018  Sex:  F                                Date of Receipt:  1/2/2019  [facility]#:  [id number]             Date of Report: 1/3/2019  Med. Record #:  [id number]  Location: [address] [place] [address]  Room/Bed:   [facility]  Physician(s): JOANN Clemens DIAGNOSIS:  Recurrent infection right heel    TYPE OF SPECIMEN:  A: RIGHT CALCANEOUS      ==========================================================================                                   * * *FINAL DIAGNOSIS* * *       Right calcaneus:       Bone with focal chronic inflammation and fibrosis. GROSS DESCRIPTION:  Received in formalin labeled with patient's identifiers and \"right  calcaneus\", are three fragments of bone aggregating to 0.5 x 0.5 x 0.3 cm.   The specimen is submitted entirely in one cassette after decal.    [facility] 1/2/2019 09:54 AM    STA/sjr     * * *MICROSCOPIC DESCRIPTION:* * *    This is bone showing some reactive changes, a little focal endosteal  fibrosis, and an increase in plasma cells in the fatty marrow.  There is  no neutrophilic inflammation.  The bone is viable.       MADISON Mccormick III, M.D./alan Albarado M.D. Results   CT ABD PELV W CONT (Accession [id number]) (Order [id number])   Allergies        High: Vancomycin   Unspecified: Ampicillin; Bactrim [Sulfamethoxazole-trimethoprim];   Blueberry;  Ciprofloxacin; Codeine;  Crestor [Rosuvastatin]; Darvocet A500 [Propoxyphene N-acetaminophen]; Demerol [Meperidine]; Levaquin [Levofloxacin]; Lipitor [Atorvastatin]; Magnesium Oxide;  Minocin [Minocycline];  Pcn [Penicillins]; Pravachol [Pravastatin]; Shellfish Derived;  Sulfa (Sulfonamide Antibiotics); Ultracet [Tramadol-acetaminophen]; Vicodin [Hydrocodone-acetaminophen]; Vytorin 10-10 [Ezetimibe-simvastatin]   Low: Percodan [Oxycodone Hcl-oxycodone-asa]   Result Information     Status: Final result (Exam End: 1/6/2019 12:31) Provider Status: Open   Study Result     CT ABDOMEN AND PELVIS WITH ENHANCEMENT     INDICATION: Paraplegic with Admitted with right foot infection suspicious for  osteomyelitis with dysphagia, nausea, abdominal pain.     TECHNIQUE: Axial images obtained of the abdomen and pelvis following the  uneventful administration of  70 cc's Isovue-300 nonionic intravenous contrast.   Coronal and sagittal reformatted images of the abdomen and pelvis were obtained.     All CT scans at this facility are performed using dose optimization technique as  appropriate to a performed exam, to include automated exposure control,  adjustment of the mA and/or kV according to patient size (including appropriate  matching first site-specific examinations), or use of iterative reconstruction  technique.     COMPARISON: 6/8/2017; CT chest 9/4/2018. ABDOMEN FINDINGS:      Liver: Unremarkable. Spleen: Similar mildly enlarged measuring 13.2 cm in AP diameter. Pancreas: Unremarkable. Biliary: Cholecystectomy. No biliary ductal dilation. Bowel: Moderate colonic stool burden in the colon and rectum. Normal appendix. Peritoneum/ Retroperitoneum: The prior right psoas enlargement and abscess have  resolved. No ascites or free fluid. Lymph Nodes: Unremarkable.     Adrenal Glands: Unremarkable. Kidneys: Cortical scarring right greater than left.  There is a similar sized 1.5  cm exophytic lesion at the right upper renal pole with density greater than  water since at least 6/8/2017. Size is reassuring for benign or indolent lesion. There is associated cortical scarring. Similar 6 cm exophytic left lower pole  renal cyst. Similar to small to characterize right interpolar renal lesion.     Vessels: Mild atherosclerosis. Infrarenal IVC filter in place. IVC and renal  veins are flattened.     PELVIS FINDINGS:      Bladder/ Pelvic Organs: Bladder is decompressed with Krueger in place. Status post  hysterectomy. No suspicious adnexal lesions. No free fluid in the pelvis.     Lung Base: Status post CABG. Epicardial pacer leads. Left anterior abdominal  wall cardiac device. Similar region of subsegmental atelectasis or scarring at  the right greater than left lung bases. There is associated mild bronchiectasis.     Bones: Sarcopenia compatible with history of paraplegia. Incompletely visualized  median sternotomy. Lumbar facet hypertrophy. Osteopenia. There is a linear 3.8 x  1.6 cm soft tissue density within the soft tissues overlying the left  coccyx/medial gluteal fold (91). No fluid density. Underlying cortex is intact. Degenerative changes left greater than right hips. Degenerative disc disease.     IMPRESSION  IMPRESSION:     1. Moderate colonic stool burden; correlate for constipation. 2.  Indeterminate new superficial soft tissue density near the coccyx/left  medial gluteal fold without fluid density. This could represent phlegmon or  scarring. No underlying findings of osteomyelitis. 3.  Resolved right psoas abscess. 4.  Hypovolemia. 5.  Similar mild splenomegaly.   6.  Lung base bronchiectasis, likely sequela of prior infection.        Pertinent Lab Data:  Recent Labs     01/06/19  0625   WBC 4.0*   HGB 9.9*   HCT 31.1*        Recent Labs     01/06/19  0625      K 3.7      CO2 29   GLU 84   BUN 21*   CREA 1.33*   CA 8.6   ALB 2.8*   SGOT 28   ALT 33       DISCHARGE MEDICATIONS:   @  Current Discharge Medication List      START taking these medications    Details   amoxicillin-clavulanate (AUGMENTIN) 400-57 mg/5 mL suspension Take 10 mL by mouth every twelve (12) hours. Qty: 100 mL, Refills: 1      ciprofloxacin (CIPRO) 500 mg/5 mL suspension Take 5 mL by mouth every twelve (12) hours. Qty: 100 mL, Refills: 0      diphenhydrAMINE (BENADRYL) 25 mg capsule Take 1 Cap by mouth every six (6) hours as needed for Itching for up to 10 days. Qty: 30 Cap, Refills: 0      fluticasone (FLONASE) 50 mcg/actuation nasal spray 1 spray each nostril BID  Qty: 1 Bottle, Refills: 0      lactobacillus sp. 50 billion cpu (BIO-K PLUS) 50 billion cell -375 mg cap capsule Take 1 Cap by mouth daily. Qty: 60 Cap, Refills: 1         CONTINUE these medications which have NOT CHANGED    Details   carvedilol (COREG) 6.25 mg tablet Take 1 Tab by mouth two (2) times a day. Qty: 60 Tab, Refills: 6      furosemide (LASIX) 40 mg tablet 1 tab daily  Qty: 30 Tab, Refills: 6      metFORMIN ER (GLUCOPHAGE XR) 500 mg tablet       ondansetron (ZOFRAN ODT) 4 mg disintegrating tablet 1 Tab by SubLINGual route every six (6) hours as needed. Qty: 10 Tab, Refills: 0      baclofen (LIORESAL) 10 mg tablet Take 1 Tab by mouth three (3) times daily. Qty: 1 Tab, Refills: 0      cholecalciferol, VITAMIN D3, (VITAMIN D3) 5,000 unit tab tablet Take  by mouth daily. gabapentin (NEURONTIN) 300 mg capsule Take 2 Caps by mouth two (2) times a day. Indications: NEUROPATHIC PAIN  Qty: 100 Cap, Refills: 0      insulin lispro (HUMALOG) 100 unit/mL injection See sliding scale  Qty: 1 Vial, Refills: 0      folic acid (FOLVITE) 1 mg tablet Take 1 Tab by mouth daily. Qty: 30 Tab, Refills: 0      insulin glargine (LANTUS) 100 unit/mL injection 5 Units by SubCUTAneous route nightly.  Indications: type 2 diabetes mellitus  Qty: 1 Vial, Refills: 0    Associated Diagnoses: Type 2 diabetes mellitus with diabetic neuropathy, with long-term current use of insulin (HCC) famotidine (PEPCID) 20 mg tablet Take 1 Tab by mouth nightly. Qty: 30 Tab, Refills: 0               My Recommended Diet, Activity, Wound Care, and follow-up labs are listed in the patient's Discharge Insturctions which I have personally completed and reviewed. Disposition:     [x] Home with family     [] [facility] PT/RN   [] SNF/NH   [] Inpatient Rehab/[facility]  Condition at Discharge:  Stable    Follow up with:   PCP : Ortiz Marie MD      Please follow-up tests/labs that are still pendin.  None  2.    >30 minutes spent coordinating this discharge (review instructions/follow-up, prescriptions, preparing report for sign off)    Signed:  Tiffany Guillory MD  2019  8:59 AM

## 2019-01-08 NOTE — PROGRESS NOTES
Bedside and Verbal shift change report given to DANIEL Aguilera (oncoming nurse) by Hawa Haley RN (offgoing nurse). Report included the following information SBAR, Kardex, Procedure Summary, Intake/Output, MAR and Recent Results.

## 2019-01-08 NOTE — ROUTINE PROCESS
Bedside and Verbal shift change report given to Denisse Sebastian (oncoming nurse) by Jose Carlos Hurtado (offgoing nurse). Report included the following information SBAR, Kardex, Intake/Output, MAR and Recent Results.

## 2019-01-08 NOTE — PROGRESS NOTES
1500 Patient discharged home. Prescriptions faxed to [facility] per patient request. Discharge instructions provided and reviewed. As part of the discharge instructions, medications already given today were discussed with the patient. The next dose due of all ordered meds was highlighted as part of the medication discharge instructions. Discussed with the patient the importance of taking medications as directed, as well as the side effects and adverse reactions to medications ordered.

## 2019-01-08 NOTE — PROGRESS NOTES
Discharge planning Discharge order noted for today. Pt has been accepted to [facility] and will resume care. Met with pt and  agreeable to the transition plan today. Transport has been arranged with spouse. P'ts discharge [facility] orders have been forwarded to  [facility]. CM will continue to monitor for transitional needs for a safe discharge. FERNY DuarnN, RN Pager # [phone] Care Manager

## 2019-01-08 NOTE — PROGRESS NOTES
Problem: Pressure Injury - Risk of 
Goal: *Prevention of pressure injury Document Surjit Scale and appropriate interventions in the flowsheet. Outcome: Progressing Towards Goal 
Pressure Injury Interventions: 
Sensory Interventions: Assess need for specialty bed, Avoid rigorous massage over bony prominences Moisture Interventions: Absorbent underpads Activity Interventions: Increase time out of bed, Pressure redistribution bed/mattress(bed type) Mobility Interventions: HOB 30 degrees or less, Pressure redistribution bed/mattress (bed type) Nutrition Interventions: Document food/fluid/supplement intake Friction and Shear Interventions: Foam dressings/transparent film/skin sealants, HOB 30 degrees or less, Minimize layers

## 2019-01-08 NOTE — PROGRESS NOTES
0800:  Report received, care assumed. Complete assessment performed. Denies pain or discomfort. See flowsheets. Monitor. 1200:  Patient for discharge home today. Prescriptions being filled at [address] per patient request.  No acute changes noted. Bedside and Verbal shift change report given to [facility] (oncoming nurse) by Charles Landa RN(offgoing nurse). Report included the following information SBAR, Kardex, Intake/Output, MAR, Accordion and Recent Results.

## 2019-01-08 NOTE — PROGRESS NOTES
Infectious Disease Progress Note Requested by: Dr. Leon Nieves Reason: right heel infected ulcer Current abx Prior abx Ciprofloxacin since 1/5/19 Amoxicillin/clavulanate since 1/3/19 linezolid 12/27-12/28/18 Meropenem 12/28/18-1/4/19 Lines:  
 
 
Assessment : 
 
76 y. o. female who has a h/o  uncontrolled DM2 (last hgbA1C 8.7 on 10/18/18), HTN, CHF, CAD and paraplegia who presented to ed on 10/17/18 due to worsening wound on her right heel.  
  
   
 hospitalization (4/20/17-5/15/17) for Sepsis  secondary to group B streptococcus bloodstream infection (positive blood cultures 4/20, negative blood cultures 4/21). Most likely source of bloodstream infection is infected right psoas hematoma/abscess. 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    
  
bilateral LE dvt - s/p IVC filter placement 5/11 
  
Hospitalization 8/2018 for infected right heel ulcer, acute osteomyelitis right calcaneum. S/p Radical debridement of necrotic tissue and debridement of heel bone.  Wound irrigation, application of wound VAC on 8/20/18. Intra op cultures negative. CRP 2.4 on 8/21/18 
  
Hospitalization 10/2018 for acute on chronic osteomyelitis right calcaneum. S/p I&D. Intra op pathology confirms diagnosis of acute on chronic osteomyelitis. Intra op cultures 10/19 (off antibiotics)- esbl klebsiella, rare enterococcus faecalis, peptostreptococcus.  
  
Esr: 62 on 10/18. Crp: 2.4 on 10/19 
  
Wound cultures 10/17- MRSA, esbl klebsiella, enterococcus faecalis, diphtheroids 
  
Urine cultures 10/17 reveals esbl e.coli, klebsiella likely colonizer. No s/s to suggest cystitis. Now with non healing right heel ulcer. Highly complex clinical picture. Clinical presentation c/w acute cellulitis right foot/leg likely due to non healing right heel ulcer. S/p I&D 12/31- intra op cultures negative. Intra op cultures negative likely due to prior antibiotics. Intra op findings d/w dr. Kylah aHrt. No purulence. No evidence of chronic osteomyelitis. Histology of calcaneum suggestive of chronic inflammation. Looking at the above information, I am concerned that patient's cellulitis was likely due to acute on chronic osteomyelitis right calcaneum - breakthrough infection after discontinuation of antibiotics. Recurrence despite recent prolonged iv abx makes abx management very complicated. Due to neuropathy and delayed healing, she will likely need very prolonged abx for cure. This can be best achieved with prolonged po abx.  
  
 
abx management highly complicated due to h/o multiple abx allergies including ampicillin/vancomycin. Has tolerated ceftriaxone in the past. Significant itching with vancomycin, quinolones. Currently patient has tolerated liquid ciprofloxacin/augmentin. resolved edema right leg/right foot. podiatry f/u appreciated.  
  
Recommendations: 
   
1. cont liquid po ciprofloxacin ,  po liquid augmentin (for enterococcus) for 6-12 weeks based on clinical response 2. f/u podiatry recommendations 3. Patient should follow up with dr. Rubina Can ([facility] outpatient infectious disease physician - [phone]) after 3 weeks.  
Debby Thomas to d/c patient if ok with podiatrist.  
5. Probiotics while on abx 
  
 Advance Care planning: full code: discussed  with patient/surrogate decision maker:Tomi Gonzalez: [phone] 
[facility] plan was discussed in details with patient, dr. Kylah Hart. Please call me if any further questions or concerns. Will continue to participate in the care of this patient. 
  
 
subjective: 
 
Feels fine. No problems with liquid antibiotics.   
Patient denies headaches, visual disturbances, sore throat, runny nose, earaches, cp, sob, cough, abdominal pain, diarrhea, burning micturition, or weakness in extremities. she denies back pain/flank pain.    
  
  
 
 
  
 Medication List  
  
START taking these medications   
amoxicillin-clavulanate 400-57 mg/5 mL suspension Commonly known as:  AUGMENTIN Take 10 mL by mouth every twelve (12) hours. ciprofloxacin 500 mg/5 mL suspension Commonly known as:  CIPRO Take 5 mL by mouth every twelve (12) hours. diphenhydrAMINE 25 mg capsule Commonly known as:  BENADRYL Take 1 Cap by mouth every six (6) hours as needed for Itching for up to 10 days. fluticasone 50 mcg/actuation nasal spray Commonly known as:  FLONASE 
1 spray each nostril BID Start taking on:  1/9/2019 
  
lactobacillus sp. 50 billion cpu 50 billion cell -375 mg Cap capsule Commonly known as:  BIO-K PLUS Take 1 Cap by mouth daily. Start taking on:  1/9/2019 CHANGE how you take these medications   
famotidine 20 mg tablet Commonly known as:  PEPCID Take 1 Tab by mouth nightly. What changed:   
· when to take this · reasons to take this 
  
insulin glargine 100 unit/mL injection Commonly known as:  LANTUS U-100 INSULIN 
5 Units by SubCUTAneous route nightly. Indications: type 2 diabetes mellitus What changed:  how much to take 
  
insulin lispro 100 unit/mL injection Commonly known as:  HUMALOG See sliding scale What changed:   
· how much to take · when to take this 
· additional instructions CONTINUE taking these medications   
baclofen 10 mg tablet Commonly known as:  LIORESAL Take 1 Tab by mouth three (3) times daily. carvedilol 6.25 mg tablet Commonly known as:  Wendall Lundborg Take 1 Tab by mouth two (2) times a day. cholecalciferol (VITAMIN D3) 5,000 unit Tab tablet Commonly known as:  VITAMIN D3 
  
folic acid 1 mg tablet Commonly known as:  [employer] Take 1 Tab by mouth daily. furosemide 40 mg tablet Commonly known as:  LASIX 
1 tab daily 
  
gabapentin 300 mg capsule Commonly known as:  NEURONTIN Take 2 Caps by mouth two (2) times a day. Indications: NEUROPATHIC PAIN 
  
metFORMIN  mg tablet Commonly known as:  GLUCOPHAGE XR 
  
ondansetron 4 mg disintegrating tablet Commonly known as:  ZOFRAN ODT 
1 Tab by SubLINGual route every six (6) hours as needed. Where to Get Your Medications Information about where to get these medications is not yet available Ask your nurse or doctor about these medications · amoxicillin-clavulanate 400-57 mg/5 mL suspension · ciprofloxacin 500 mg/5 mL suspension · diphenhydrAMINE 25 mg capsule · fluticasone 50 mcg/actuation nasal spray · lactobacillus sp. 50 billion cpu 50 billion cell -375 mg Cap capsule Current Facility-Administered Medications Medication Dose Route Frequency  bisacodyl (DULCOLAX) tablet 10 mg  10 mg Oral DAILY PRN  
 lactobacillus sp. 50 billion cpu (BIO-K PLUS) capsule 1 Cap  1 Cap Oral DAILY  ciprofloxacin (CIPRO) oral suspension 500 mg  500 mg Oral Q12H  furosemide (LASIX) tablet 60 mg  60 mg Oral DAILY  amoxicillin-clavulanate (AUGMENTIN) 400-57 mg/5 mL oral suspension 800 mg  800 mg Oral Q12H  diphenhydrAMINE (BENADRYL) capsule 25 mg  25 mg Oral Q6H PRN  
 ondansetron hcl (ZOFRAN) tablet 4 mg  4 mg Oral Q6H PRN  
 baclofen (LIORESAL) tablet 10 mg  10 mg Oral TID PRN  promethazine (PHENERGAN) 12.5 mg in 0.9% sodium chloride 50 mL IVPB  12.5 mg IntraVENous Q6H PRN  
 fluticasone (FLONASE) 50 mcg/actuation nasal spray 2 Spray  2 Spray Both Nostrils DAILY  loratadine (CLARITIN) tablet 10 mg  10 mg Oral DAILY  acetaminophen (TYLENOL) tablet 650 mg  650 mg Oral TID  sodium hypochlorite (QUARTER STRENGTH DAKIN'S) 0.125% irrigation (bottle)   Topical BID  carvedilol (COREG) tablet 6.25 mg  6.25 mg Oral BID  famotidine (PEPCID) tablet 20 mg  20 mg Oral QHS  gabapentin (NEURONTIN) capsule 600 mg  600 mg Oral BID  insulin glargine (LANTUS) injection 10 Units  10 Units SubCUTAneous QHS  insulin lispro (HUMALOG) injection 5 Units  0.05 Units/kg SubCUTAneous TID WITH MEALS  
  enoxaparin (LOVENOX) injection 40 mg  40 mg SubCUTAneous Q24H  
 acetaminophen (TYLENOL) tablet 650 mg  650 mg Oral Q4H PRN Allergies: Vancomycin; Ampicillin; Bactrim [sulfamethoxazole-trimethoprim]; Blueberry; Ciprofloxacin; Codeine; Crestor [rosuvastatin]; Darvocet a500 [propoxyphene n-acetaminophen]; Demerol [meperidine]; Levaquin [levofloxacin]; Lipitor [atorvastatin]; Magnesium oxide; Minocin [minocycline]; Pcn [penicillins]; Pravachol [pravastatin]; Shellfish derived; Sulfa (sulfonamide antibiotics); Ultracet [tramadol-acetaminophen]; Vicodin [hydrocodone-acetaminophen]; Vytorin 10-10 [ezetimibe-simvastatin]; and Percodan [oxycodone hcl-oxycodone-asa] Temp (24hrs), Av.6 °F (36.4 °C), Min:96.7 °F (35.9 °C), Max:98.8 °F (37.1 °C) Visit Vitals /64 (BP 1 Location: Right arm, BP Patient Position: At rest) Pulse 87 Temp 97.9 °F (36.6 °C) Resp 18 Ht 5' 7\" (1.702 m) Wt 111.6 kg (246 lb) LMP  (LMP Unknown) SpO2 99% BMI 38.53 kg/m² ROS: 12 point ROS obtained in details. Pertinent positives as mentioned in HPI,  
otherwise negative Physical Exam: 
 
General: Well developed, well nourished female laying on the bed, AAOx3 NAD 
   
General:  awake alert and oriented HEENT:  Normocephalic, atraumatic, PERRL, EOMI, no scleral icterus or pallor; no conjunctival hemmohage; nasal and oral mucous are moist and without evidence of lesions. Neck supple, no bruits. Lymph Nodes:  no cervical, axillary or inguinal adenopathy Lungs:  non-labored, bilaterally clear to auscultation- no crackles wheezes rales or rhonchi Heart:  s1 and s2 irregular; no rubs or gallops, no edema, + pedal pulses Abdomen: soft, non-distended, active bowel sounds, no hepatomegaly, no splenomegaly. no tenderness over the left abdominal pacemaker, no overlying erythema or fluctuance,no tenderness Genitourinary: deferred Extremities:  no clubbing, cyanosis; resolved edema right leg/foot, right foot dressing not opened Neurologic:  No gross focal sensory abnormalities; 5/5 muscle strength to upper extremities. 1/5 strength in lower extremities. Cranial nerves intact Skin:  Surgical scars abdomen, left neck well healed Back: no paraspinal muscle guarding or rigidity, no spinal or paraspinal tenderness Psychiatric:  No suicidal or homicidal ideations, appropriate mood and affect  
   
  
 
 
 
 
Labs: Results:  
Chemistry Recent Labs 01/06/19 
8592 GLU 84   
K 3.7  CO2 29 BUN 21* CREA 1.33* CA 8.6 AGAP 6  
BUCR 16  
  
TP 6.9 ALB 2.8*  
GLOB 4.1* AGRAT 0.7* CBC w/Diff Recent Labs 01/06/19 
5720 WBC 4.0*  
RBC 3.69* HGB 9.9*  
HCT 31.1*  
 Microbiology No results for input(s): CULT in the last 72 hours. RADIOLOGY: 
 
All available imaging studies/reports in [facility] for this admission were reviewed Dr. Annamaria Jean, Infectious Disease Specialist 
[phone] January 8, 2019 11:44 AM

## 2019-01-22 ENCOUNTER — OFFICE VISIT (OUTPATIENT)
Dept: INTERNAL MEDICINE CLINIC | Age: 69
End: 2019-01-22

## 2019-01-22 ENCOUNTER — HOSPITAL ENCOUNTER (OUTPATIENT)
Dept: LAB | Age: 69
Discharge: HOME | End: 2019-01-22
Payer: COMMERCIAL

## 2019-01-22 VITALS
HEART RATE: 80 BPM | OXYGEN SATURATION: 97 % | DIASTOLIC BLOOD PRESSURE: 78 MMHG | RESPIRATION RATE: 18 BRPM | BODY MASS INDEX: 38.53 KG/M2 | SYSTOLIC BLOOD PRESSURE: 136 MMHG | HEIGHT: 67 IN | TEMPERATURE: 98 F

## 2019-01-22 DIAGNOSIS — E11.628 DIABETIC FOOT INFECTION (HCC): ICD-10-CM

## 2019-01-22 DIAGNOSIS — L97.412 SKIN ULCER OF RIGHT HEEL WITH FAT LAYER EXPOSED (HCC): ICD-10-CM

## 2019-01-22 DIAGNOSIS — L08.9 DIABETIC FOOT INFECTION (HCC): ICD-10-CM

## 2019-01-22 DIAGNOSIS — L97.412 SKIN ULCER OF RIGHT HEEL WITH FAT LAYER EXPOSED (HCC): Primary | ICD-10-CM

## 2019-01-22 PROCEDURE — 80053 COMPREHEN METABOLIC PANEL: CPT

## 2019-01-22 PROCEDURE — 86140 C-REACTIVE PROTEIN: CPT

## 2019-01-22 RX ORDER — CLINDAMYCIN HYDROCHLORIDE 300 MG/1
300 CAPSULE ORAL 3 TIMES DAILY
Qty: 90 CAP | Refills: 0 | Status: SHIPPED | OUTPATIENT
Start: 2019-01-22 | End: 2019-02-07 | Stop reason: ALTCHOICE

## 2019-01-22 RX ORDER — METRONIDAZOLE 500 MG/1
500 TABLET ORAL 3 TIMES DAILY
Qty: 90 TAB | Refills: 0 | Status: SHIPPED | OUTPATIENT
Start: 2019-01-22 | End: 2019-02-07 | Stop reason: ALTCHOICE

## 2019-01-22 NOTE — PROGRESS NOTES
CORINA Arora is a 76 y.o. female with relevant past medical history ofCHF, HTN, DM2 with neuropathy (HbA1c 8.7 10/2018), h/o BL LE DVT s/p IVC fileter 5/2018, history of sepsis due to GBS bacteremia secondary to psoas hematoma/abscess complicated by spinal cord infarct, causing paraplegia?(patient reports weakness not paralysis), back in 4/2018. Here for outpatient ID follow up after recent admission for R heel acute on chronic osteomyelitis. Apparently the patient had been admitted to the hospital in August,16-24, 2018 for R foot heel ulcer complicated by acute osteomyelitis, and undergo radical debridement of tissue and bone, with wound vac placement (8/20/18), neg intraoperative cultures. Discharged on oral antibiotics- vantin- cefpodoxime to take 27 more days. The patient says she could not tolerate this antibiotic due to side effects and says she might have taken it maybe for only a week more upon discharge. She said that for a few days her heels seemed to be doing well, but then she started having malaise, fevers, chills, and worsening right heel ulcer. She was admitted again on 10/17/18 until 10/23/18, and was diagnosed again with calcaneus OM, achilles tendon infection, and underwent debridement and wound vac application by podiatry on 10/19/18. Wound cultures on admisson grew ESBL- Kleb penumo, MRSA. VSE and dipheteroids. Intraoperative cultures from 10/19/18 grew ESBL Kleb pneumo, VSE, diptheroids and peptstreptococcus. The patient was initially treated with cefepime and linezolid (10/18-10/21) then briefly transitioned to amox-clav on 10/22/18, ID inpatient, Dr. Nicolas Torres was consulted, who recommended to treat with daptomycin and meropenem until 12/7/18, given patient microbacterial findings and antibiotic allergies including vancomycin, ampicillin, FQ.   
The patient was changed to linezolid from daptomycin on 11/7/18 due to elevated CPK on daptomycin. She completed approx 3 more weeks on linezolid but it was stopped around 11/30/18 due to severe nausea, vomiting, malaise and decreased H/H, plat, presumably all secondary to the antibiotic, she completed 6 weeks total of MRSA coverage. She completed 7 weeks of antibiotic- meropenem on 12/7/18 from procedure done 10/19/18 as planned. CRP had come down to 0.2 (12/3/18) from 2.4 (10/19) On 12/21/19 we were notified her wound had a foul smell again and more drainage so the patient was recommended to go to the ED. She was admitted from 12/2/7/18 to 1/8/19, for suspected wound infection, cellulitis and osteomyelitis, on 12/27/18 she undergo debridment of R heel and bone and culture, application of ACell graft by podiatry Dr. Hannah Johnson. Per notes, intra-oprative findings with no evidence of chronic osteomyelitis, but chronic inflammation. Dr. Nicolas Torres (ID) recommended prolonged antibiotics with ciprofloxacin and augmentin. The patient was to have antibiotics liquid form for better tolerance, for 6-12 weeks with follow up here, however she says she has been off all antibiotics for about 5 days now, as they run out. She reports no pain, no systemic symptoms, but has been told by wound care that she has increased drainage. She has h/o penicillin, FQ, minocycline, sulfa allergies, tolerated poorly vancomycin, linezolid and daptomycin. H/o MRSA, ESBL Klebsiella, VSE. 
WC-bone cultures from most recent procedure on R calcaneous + diphtheroids only. ROS As above included in HPI. Otherwise 11 point review of systems negative including constitutional, skin, HENT, eyes, respiratory, cardiovascular, gastrointestinal, genitourinary, musculoskeletal, endocrine, hematologic, allergy, and neurologic. Past Medical History Past Medical History:  
Diagnosis Date  Acute paraplegia ([facility] 4/20/2017  Benign hypertensive heart disease with systolic CHF, NYHA class 2 ([facility] [facility] 9/5/2012  Biventricular implantable cardioverter-defibrillator in situ 04/28/2005 Upgraded to BiV AICD; gen change 4/2008; pocket revision 10/2009; Abdominal - done on 8/22/2012 by Dr. Susan Madrid  Cardiac cath 08/15/1996 Patent coronaries. Elev LVEDP. EF 50-55%.  Cardiac echocardiogram 06/23/2015 Ltd study. EF 45-50%. Mild, diffuse hypk. Severe apical hypk. No mass or thrombus was clearly identified, although imaging was suboptimal.    
 Cardiac nuclear imaging test 06/19/2015 Fixed distal apical, distal septal defect more likely due to RV pacing than prior infarct. No ischemia. EF 46%. RWMA c/w RV pacing. Nondiagnostic EKG on pharm stress test.    
 Cardiovascular lower extremity venous duplex 09/04/2012 Acute, non-occlusive DVT in CFV on right. No DVT on left. No superficial thrombosis bilaterally.  Cardiovascular upper extremity venous duplex 08/27/2012 DVT in axillary vein on left. Left subclavian was not visualized.  Chronic anemia 9/5/2012  Chronic systolic heart failure ([address]  Decreased calculated glomerular filtration rate (GFR) 3/30/2017 Calculated GFR equivalent to that of CKD stage 3 = 30-59 ml/min  Diabetic neuropathy associated with type 2 diabetes mellitus ([address] 6/28/2011  Difficult airway for intubation 08/22/2012  
 see anesthesia airway note  Dyslipidemia 6/28/2011  Gout  History of complete heart block 6/28/2011  History of Coumadin therapy Anticoagulation for DVT of the LUE; Discontinued on 3/30/2017  History of deep venous thrombosis 9/5/2012 Left upper extremity  History of pyelonephritis 3/30/2017  Left bundle branch block (LBBB) on electrocardiogram 6/28/2011  Nonischemic cardiomyopathy ([address] 6/28/2011  Obesity (BMI 35.0-39.9 without comorbidity) 3/13/2017  Obstructive sleep apnea on CPAP 2/7/2012  Psoas abscess, right ([address] 4/20/2017  Psoas hematoma, right, secondary to anticoagulant therapy 3/30/2017  Type 2 diabetes mellitus with diabetic neuropathy ([facility] 6/28/2011 Past Surgical History:  
Procedure Laterality Date  HX CARPAL TUNNEL RELEASE  4/07 right [address] [facility] [address]  HX OTHER SURGICAL  6/11/2012 AICD revision  HX PACEMAKER  4/28/2005 [employer] AICD Family History Family History Problem Relation Age of Onset  Cancer Father Leukemia Social History She  reports that  has never smoked. she has never used smokeless tobacco.  
Social History Substance and Sexual Activity Alcohol Use No  
 
 
Immunization History There is no immunization history on file for this patient. Allergies Allergies Allergen Reactions  Vancomycin Itching  Ampicillin Itching  Bactrim [Sulfamethoxazole-Trimethoprim] Unknown (comments)  Blueberry Swelling Causes throat swelling  Ciprofloxacin Itching  Codeine Other (comments) Jumpy feeling  Crestor [Rosuvastatin] Itching  Darvocet A500 [Propoxyphene N-Acetaminophen] Itching  Demerol [Meperidine] Itching  Levaquin [Levofloxacin] Itching  Lipitor [Atorvastatin] Myalgia  Magnesium Oxide Itching  
  nausea  Minocin [Minocycline] Unknown (comments)  Pcn [Penicillins] Itching  Pravachol [Pravastatin] Swelling Swelling in mouth. Not allergic per patient, takes at home  Shellfish Derived Unknown (comments)  Sulfa (Sulfonamide Antibiotics) Itching  Ultracet [Tramadol-Acetaminophen] Itching  Vicodin [Hydrocodone-Acetaminophen] Unknown (comments)  Vytorin 10-10 [Ezetimibe-Simvastatin] Myalgia  Percodan [Oxycodone Hcl-Oxycodone-Asa] Itching Medications Current Outpatient Medications Medication Sig  
 clindamycin (CLEOCIN) 300 mg capsule Take 1 Cap by mouth three (3) times daily for 30 days.  metroNIDAZOLE (FLAGYL) 500 mg tablet Take 1 Tab by mouth three (3) times daily for 30 days.  fluticasone (FLONASE) 50 mcg/actuation nasal spray 1 spray each nostril BID  lactobacillus sp. 50 billion cpu (BIO-K PLUS) 50 billion cell -375 mg cap capsule Take 1 Cap by mouth daily.  carvedilol (COREG) 6.25 mg tablet Take 1 Tab by mouth two (2) times a day.  furosemide (LASIX) 40 mg tablet 1 tab daily  metFORMIN ER (GLUCOPHAGE XR) 500 mg tablet  ondansetron (ZOFRAN ODT) 4 mg disintegrating tablet 1 Tab by SubLINGual route every six (6) hours as needed.  baclofen (LIORESAL) 10 mg tablet Take 1 Tab by mouth three (3) times daily.  cholecalciferol, VITAMIN D3, (VITAMIN D3) 5,000 unit tab tablet Take  by mouth daily.  gabapentin (NEURONTIN) 300 mg capsule Take 2 Caps by mouth two (2) times a day. Indications: NEUROPATHIC PAIN  
 insulin lispro (HUMALOG) 100 unit/mL injection See sliding scale (Patient taking differently: 4 Units Before breakfast, lunch, and dinner. See sliding scale)  folic acid (FOLVITE) 1 mg tablet Take 1 Tab by mouth daily.  insulin glargine (LANTUS) 100 unit/mL injection 5 Units by SubCUTAneous route nightly. Indications: type 2 diabetes mellitus (Patient taking differently: 20 Units by SubCUTAneous route nightly.)  famotidine (PEPCID) 20 mg tablet Take 1 Tab by mouth nightly. (Patient taking differently: Take 20 mg by mouth daily as needed.)  ciprofloxacin (CIPRO) 500 mg/5 mL suspension Take 5 mL by mouth every twelve (12) hours. No current facility-administered medications for this visit. Visit Vitals /78 (BP 1 Location: Left arm, BP Patient Position: Sitting) Pulse 80 Temp 98 °F (36.7 °C) (Axillary) Resp 18 Ht 5' 7\" (1.702 m) LMP  (LMP Unknown) SpO2 97% BMI 38.53 kg/m² Body mass index is 38.53 kg/m².  
 
Physical Exam  
Constitutional: She is oriented to person, place, and time and well-developed, well-nourished, and in no distress. No distress. HENT:  
Head: Normocephalic and atraumatic. Eyes: Pupils are equal, round, and reactive to light. Cardiovascular: Normal rate and regular rhythm. Pulmonary/Chest: Effort normal and breath sounds normal.  
Abdominal: Soft. Musculoskeletal: She exhibits edema (R heel surrounding edema, no significant warmth or erythema). She exhibits no tenderness. Right heel ulcer with foul smell and purulent discharge. Stage 3. approx size 6x4 cm. Neurological: She is alert and oriented to person, place, and time. Skin: She is not diaphoretic. Nursing note and vitals reviewed. REVIEW OF DATA Labs Admission on 12/27/2018, Discharged on 01/08/2019 No results displayed because visit has over 200 results. CT Results (most recent): 
Results from [facility] [place] Encounter encounter on 12/27/18 CT ABD PELV W CONT Narrative CT ABDOMEN AND PELVIS WITH ENHANCEMENT INDICATION: Paraplegic with Admitted with right foot infection suspicious for 
osteomyelitis with dysphagia, nausea, abdominal pain. TECHNIQUE: Axial images obtained of the abdomen and pelvis following the 
uneventful administration of  70 cc's Isovue-300 nonionic intravenous contrast.  
Coronal and sagittal reformatted images of the abdomen and pelvis were obtained. All CT scans at this facility are performed using dose optimization technique as 
appropriate to a performed exam, to include automated exposure control, 
adjustment of the mA and/or kV according to patient size (including appropriate 
matching first site-specific examinations), or use of iterative reconstruction 
technique. COMPARISON: 6/8/2017; CT chest 9/4/2018. ABDOMEN FINDINGS:  
 
Liver: Unremarkable. Spleen: Similar mildly enlarged measuring 13.2 cm in AP diameter. Pancreas: Unremarkable. Biliary: Cholecystectomy. No biliary ductal dilation. Bowel: Moderate colonic stool burden in the colon and rectum. Normal appendix. Peritoneum/ Retroperitoneum: The prior right psoas enlargement and abscess have 
resolved. No ascites or free fluid. Lymph Nodes: Unremarkable. Adrenal Glands: Unremarkable. Kidneys: Cortical scarring right greater than left. There is a similar sized 1.5 
cm exophytic lesion at the right upper renal pole with density greater than 
water since at least 6/8/2017. Size is reassuring for benign or indolent lesion. There is associated cortical scarring. Similar 6 cm exophytic left lower pole 
renal cyst. Similar to small to characterize right interpolar renal lesion. Vessels: Mild atherosclerosis. Infrarenal IVC filter in place. IVC and renal 
veins are flattened. PELVIS FINDINGS:  
 
Bladder/ Pelvic Organs: Bladder is decompressed with Krueger in place. Status post 
hysterectomy. No suspicious adnexal lesions. No free fluid in the pelvis. Lung Base: Status post CABG. Epicardial pacer leads. Left anterior abdominal 
wall cardiac device. Similar region of subsegmental atelectasis or scarring at 
the right greater than left lung bases. There is associated mild bronchiectasis. Bones: Sarcopenia compatible with history of paraplegia. Incompletely visualized 
median sternotomy. Lumbar facet hypertrophy. Osteopenia. There is a linear 3.8 x 
1.6 cm soft tissue density within the soft tissues overlying the left 
coccyx/medial gluteal fold (91). No fluid density. Underlying cortex is intact. Degenerative changes left greater than right hips. Degenerative disc disease. Impression IMPRESSION: 
 
1. Moderate colonic stool burden; correlate for constipation. 2.  Indeterminate new superficial soft tissue density near the coccyx/left 
medial gluteal fold without fluid density. This could represent phlegmon or 
scarring. No underlying findings of osteomyelitis. 3.  Resolved right psoas abscess. 4.  Hypovolemia. 5.  Similar mild splenomegaly. 6.  Lung base bronchiectasis, likely sequela of prior infection. XR Results (most recent): 
Results from Hospital Encounter encounter on 12/27/18 XR FOOT RT AP/LAT Narrative EXAM:  Right foot series CLINICAL INDICATION:  Infection to the right foot COMPARISON:  12/21/18 TECHNIQUE:  Frontal and lateral views of the right foot FINDINGS:  A large soft tissue defect is noted at the posterior heel region, 
with unusually foreshortened appearance of the calcaneus and markedly pronounced 
sclerotic changes to the calcaneus. Possible subtle cortical resorption in the 
inferior aspect of the calcaneus. There is diffuse bony mineralization. Impression IMPRESSION:   
 
1. Soft tissue defect in the posterior heel region. 2.  Foreshortened calcaneus with sharply marginated posterior calcaneal region. Query recent surgical intervention. Possible subtle cortical defect at the 
inferior margin. Developing infectious process cannot be excluded. CT All Micro Results None DIAGNOSIS AND PLAN Patient Active Problem List  
Diagnosis Code  Nonischemic cardiomyopathy ([facility]) I42.8  History of complete heart block Z86.79  Biventricular implantable cardioverter-defibrillator in situ Z95.810  Left bundle branch block (LBBB) on electrocardiogram I44.7  Type 2 diabetes mellitus with diabetic neuropathy ([facility]) E11.40  Dyslipidemia E78.5  Diabetic neuropathy associated with type 2 diabetes mellitus ([facility]) E11.40  Obstructive sleep apnea on CPAP G47.33, Z99.89  
 AICD generator infection ([facility] T82. 7XXA  Difficult airway for intubation T88. 4XXA  Benign hypertensive heart disease with systolic CHF, NYHA class 2 ([facility]) I11.0, I50.20  Decreased calculated glomerular filtration rate (GFR) R94.4  Chronic anemia D64.9  
 History of deep venous thrombosis Z86.718  Anticoagulated on Coumadin Z51.81, Z79.01  
 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  
 Obesity (BMI 35.0-39.9 without comorbidity) E66.9  
 History of pyelonephritis Z87.448  Iliopsoas muscle hematoma S70.10XA  Psoas hematoma, right, secondary to anticoagulant therapy S30. [place]  Impaired mobility and ADLs Z74.09  
 History of Coumadin therapy Z92.29  
 Gout M10.9  Acute paraplegia ([facility]) G82.20  Sepsis ([address] A41.9  Psoas abscess, right ([address] K68.12  
 Krueger catheter in place on admission Z96.0  
 Urinary tract infection due to Enterococcus N39.0, B95.2  Group B streptococcal infection A49.1  Hypervolemia E87.70  Anemia D64.9  Biventricular cardiac pacemaker in situ Z95.0  Neurogenic bladder N31.9  Type 2 diabetes with nephropathy ([facility]) E11.21  
 Obesity (BMI 30.0-34. 9) E66.9  Severe obesity (BMI 35.0-39. 9) with comorbidity ([address] E66.01  
 Osteomyelitis ([address] M86.9  Foot osteomyelitis, right ([address] M86.9  Heel ulcer ([address] L97.409  Infected wound T14. 8XXA, L08.9 1. Right heel non healing ulcer 2. Diabetic foot infection History of admission on 10/17/18 until 10/23/18, for R foot calcaneus OM, achilles tendon infection, s/p debridement and wound vac application by podiatry on 10/19/18. Wound cultures on 10/17/18 grew ESBL- Kleb penumo, MRSA. VSE and dipheteroids. Intraoperative cultures from 10/19/18 grew ESBL Kleb pneumo, VSE, diptheroids and peptstreptococcus. The patient was initially treated with cefepime and linezolid (10/18-10/21) then briefly transitioned to amox-clav on 10/22/18, ID inpatient, Dr. Cheryl Gonzalez was consulted, who recommended to treat with daptomycin and meropenem until 12/7/18, given patient microbacterial findings and antibiotic allergies including vancomycin, ampicillin, FQ. The patient reported mild itching with meropenem but it was relieved by atarax.  
The patient was changed to linezolid from daptomycin on 11/7/18 due to elevated CPK on daptomycin. She completed approx 3 more weeks on linezolid but it was stopped around 11/30/18 due to severe nausea, vomiting, malaise and decreased H/H, plat, presumably all secondary to the antibiotic, she completed 6 weeks total of MRSA coverage. She felt much better after discontinuation of linezolid. She completed 7 weeks of antibiotic- meropenem on 12/7/18 from procedure done 10/19/18 as planned. CRP had come down to 0.2 (12/3/18) from 2.4 (10/19) On 12/21/19 we were notified her wound had a foul smell again and more drainage so the patient was recommended to go to the ED. She was admitted from 12/2/7/18 to 1/8/19, for suspected wound infection, cellulitis and osteomyelitis, on 12/27/18 she undergo debridment of R heel and bone and culture, application of ACell graft by podiatry Dr. Puja Brar. Per notes, intra-oprative findings with no evidence of chronic osteomyelitis, but chronic inflammation. Dr. Grace Murillo (ID) recommended prolonged antibiotics with ciprofloxacin and augmentin. The patient was to have antibiotics liquid form for better tolerance, for 6-12 weeks with follow up here, however she says she has been off all antibiotics for about 5 days now, as they run out. She reports no pain, no systemic symptoms, but has been told by wound care that she has increased drainage. She has h/o penicillin, FQ, minocycline, sulfa allergies, tolerated poorly vancomycin, linezolid and daptomycin. H/o MRSA, ESBL Klebsiella, VSE. 
WC-bone cultures from most recent procedure on R calcaneous + diphtheroids only. Plan Will obtain baseline labs today including CRP, imaging depending on clinical progress Re-start in the meantime oral antibiotics- clindamycin and metronidazol with close follow up.  If wound not improving, may need to place PICC line and re-start broad-spectrum antibiotic therapy, including ESBL kleb pneumo coverage and MRSA coverage found in cultures before. Difficult case due to comorbidities and allergy history. Follow-up Disposition: 
Return in about 2 weeks (around 2/5/2019). Joanne Reese MD

## 2019-01-22 NOTE — PROGRESS NOTES
1. Have you been to the ER, urgent care clinic or hospitalized since your last visit? YES.  
 
2. Have you seen or consulted any other health care providers outside of the [address] [place] [address] since your last visit (Include any pap smears or colon screening)? YES  Dr Mana Pitt, podiatry Do you have an Advanced Directive? NO Would you like information on Advanced Directives?  NO

## 2019-01-23 ENCOUNTER — TELEPHONE (OUTPATIENT)
Dept: INTERNAL MEDICINE CLINIC | Age: 69
End: 2019-01-23

## 2019-01-23 LAB
ALBUMIN SERPL-MCNC: 3.7 G/DL (ref 3.4–5)
ALBUMIN/GLOB SERPL: 0.9 {RATIO} (ref 0.8–1.7)
ALP SERPL-CCNC: 111 U/L (ref 45–117)
ALT SERPL-CCNC: 26 U/L (ref 13–56)
ANION GAP SERPL CALC-SCNC: 10 MMOL/L (ref 3–18)
AST SERPL-CCNC: 29 U/L (ref 15–37)
BILIRUB SERPL-MCNC: 0.6 MG/DL (ref 0.2–1)
BUN SERPL-MCNC: 24 MG/DL (ref 7–18)
BUN/CREAT SERPL: 18 (ref 12–20)
CALCIUM SERPL-MCNC: 9.3 MG/DL (ref 8.5–10.1)
CHLORIDE SERPL-SCNC: 107 MMOL/L (ref 100–108)
CO2 SERPL-SCNC: 24 MMOL/L (ref 21–32)
CREAT SERPL-MCNC: 1.31 MG/DL (ref 0.6–1.3)
CRP SERPL-MCNC: 2.2 MG/DL (ref 0–0.3)
GLOBULIN SER CALC-MCNC: 4 G/DL (ref 2–4)
GLUCOSE SERPL-MCNC: 90 MG/DL (ref 74–99)
POTASSIUM SERPL-SCNC: 4 MMOL/L (ref 3.5–5.5)
PROT SERPL-MCNC: 7.7 G/DL (ref 6.4–8.2)
SODIUM SERPL-SCNC: 141 MMOL/L (ref 136–145)

## 2019-01-23 NOTE — TELEPHONE ENCOUNTER
Chelo Maki called from [place] [id number] lab to let us know that quantity for the CBC draw from yesterday was not sufficient. Will need to be redrawn.

## 2019-01-30 ENCOUNTER — TELEPHONE (OUTPATIENT)
Dept: INTERNAL MEDICINE CLINIC | Age: 69
End: 2019-01-30

## 2019-01-30 NOTE — TELEPHONE ENCOUNTER
Patient is calling to let you know that the Clindamycin HCL caused her to have some side effects (itching, hives, nausea, bad taste in mouth) She stopped taking it for 3 days and has been feeling a whole lot better She is still taking the Metronidazole.

## 2019-01-31 DIAGNOSIS — L08.9 DIABETIC FOOT INFECTION (HCC): Primary | ICD-10-CM

## 2019-01-31 DIAGNOSIS — E11.628 DIABETIC FOOT INFECTION (HCC): Primary | ICD-10-CM

## 2019-01-31 DIAGNOSIS — Z88.9 MULTIPLE DRUG ALLERGIES: ICD-10-CM

## 2019-02-07 ENCOUNTER — OFFICE VISIT (OUTPATIENT)
Dept: CARDIOLOGY CLINIC | Age: 69
End: 2019-02-07

## 2019-02-07 ENCOUNTER — CLINICAL SUPPORT (OUTPATIENT)
Dept: CARDIOLOGY CLINIC | Age: 69
End: 2019-02-07

## 2019-02-07 ENCOUNTER — OFFICE VISIT (OUTPATIENT)
Dept: INTERNAL MEDICINE CLINIC | Age: 69
End: 2019-02-07

## 2019-02-07 ENCOUNTER — TELEPHONE (OUTPATIENT)
Dept: CARDIOLOGY CLINIC | Age: 69
End: 2019-02-07

## 2019-02-07 VITALS
OXYGEN SATURATION: 97 % | SYSTOLIC BLOOD PRESSURE: 130 MMHG | HEIGHT: 67 IN | DIASTOLIC BLOOD PRESSURE: 78 MMHG | HEART RATE: 80 BPM | BODY MASS INDEX: 38.53 KG/M2

## 2019-02-07 VITALS
HEART RATE: 82 BPM | DIASTOLIC BLOOD PRESSURE: 78 MMHG | SYSTOLIC BLOOD PRESSURE: 136 MMHG | TEMPERATURE: 97.8 F | BODY MASS INDEX: 38.53 KG/M2 | OXYGEN SATURATION: 98 % | RESPIRATION RATE: 17 BRPM | HEIGHT: 67 IN

## 2019-02-07 DIAGNOSIS — M79.89 LEG SWELLING: Primary | ICD-10-CM

## 2019-02-07 DIAGNOSIS — M86.9 OSTEOMYELITIS OF RIGHT FOOT, UNSPECIFIED TYPE (HCC): ICD-10-CM

## 2019-02-07 DIAGNOSIS — Z95.810 BIVENTRICULAR IMPLANTABLE CARDIOVERTER-DEFIBRILLATOR IN SITU: ICD-10-CM

## 2019-02-07 DIAGNOSIS — I50.22 CHRONIC SYSTOLIC HEART FAILURE (HCC): ICD-10-CM

## 2019-02-07 DIAGNOSIS — I42.8 NONISCHEMIC CARDIOMYOPATHY (HCC): Primary | Chronic | ICD-10-CM

## 2019-02-07 DIAGNOSIS — O22.30 DVT (DEEP VEIN THROMBOSIS) IN PREGNANCY: ICD-10-CM

## 2019-02-07 DIAGNOSIS — Z79.4 TYPE 2 DIABETES MELLITUS WITH DIABETIC NEUROPATHY, WITH LONG-TERM CURRENT USE OF INSULIN (HCC): ICD-10-CM

## 2019-02-07 DIAGNOSIS — I42.8 NONISCHEMIC CARDIOMYOPATHY (HCC): Chronic | ICD-10-CM

## 2019-02-07 DIAGNOSIS — I44.7 LEFT BUNDLE BRANCH BLOCK (LBBB) ON ELECTROCARDIOGRAM: ICD-10-CM

## 2019-02-07 DIAGNOSIS — G82.20 PARAPLEGIA (HCC): ICD-10-CM

## 2019-02-07 DIAGNOSIS — E11.40 TYPE 2 DIABETES MELLITUS WITH DIABETIC NEUROPATHY, WITH LONG-TERM CURRENT USE OF INSULIN (HCC): ICD-10-CM

## 2019-02-07 DIAGNOSIS — E11.628 DIABETIC FOOT INFECTION (HCC): ICD-10-CM

## 2019-02-07 DIAGNOSIS — L08.9 DIABETIC FOOT INFECTION (HCC): ICD-10-CM

## 2019-02-07 DIAGNOSIS — I11.0 BENIGN HYPERTENSIVE HEART DISEASE WITH SYSTOLIC CHF, NYHA CLASS 2 (HCC): ICD-10-CM

## 2019-02-07 DIAGNOSIS — I50.20 BENIGN HYPERTENSIVE HEART DISEASE WITH SYSTOLIC CHF, NYHA CLASS 2 (HCC): ICD-10-CM

## 2019-02-07 DIAGNOSIS — L97.412 SKIN ULCER OF RIGHT HEEL WITH FAT LAYER EXPOSED (HCC): Primary | ICD-10-CM

## 2019-02-07 RX ORDER — PREDNISONE 20 MG/1
TABLET ORAL
Qty: 6 TAB | Refills: 0 | Status: SHIPPED | OUTPATIENT
Start: 2019-02-07 | End: 2019-08-19 | Stop reason: ALTCHOICE

## 2019-02-07 RX ORDER — DIPHENHYDRAMINE HCL 25 MG
CAPSULE ORAL
Qty: 2 CAP | Refills: 0 | Status: ON HOLD | OUTPATIENT
Start: 2019-02-07 | End: 2019-12-12

## 2019-02-07 RX ORDER — METRONIDAZOLE 500 MG/1
TABLET ORAL
Refills: 0 | COMMUNITY
Start: 2019-01-22 | End: 2019-02-21 | Stop reason: SDUPTHER

## 2019-02-07 NOTE — PROGRESS NOTES
HPI: I saw Dae Serrano REY Ramos in my office today in cardiovascular evaluation regarding her dilated nonischemic cardiomyopathy. Ms. Josh Ramos is a very pleasant 78 year old fair skinned  female with history of a dilated cardiomyopathy with mild left ventricular dysfunction with an ejection fraction of 40-45% range with widely patent coronary arteries on a cardiac catheterization back in August of 1996. Her very last echocardiogram in June of 2015 showed that her ejection fraction was still very mildly decreased at 45-50%. She did have a biventricular AICD placed, but due to trauma and infection in that area that had to be removed and now she has a biventricular pacemaker with a pulse generator residing in her left upper quadrant, placed back in October of 2012.  
   
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 She comes in today and unfortunately she is still being treated for an infection in her right foot and her has pacer battery is at [facility] and she is pacer dependent with the battery at recommended replacement time since January 13, 2019. Since the patient is pacer dependent she will need to have her generator changed despite her ongoing infection which apparently is improving. She relates that she is doing reasonably well without any cardiovascular complaints except for chronic lower extremity edema. She does sleep with her head up at night chronically but has had no paroxysmal nocturnal dyspnea. Encounter Diagnoses Name Primary?  Nonischemic cardiomyopathy ([address] Yes  Chronic systolic heart failure ([address]  Left bundle branch block (LBBB) on electrocardiogram   
 Biventricular implantable cardioverter-defibrillator in situ and at [facility]  Benign hypertensive heart disease with systolic CHF, NYHA class 2 ([address]  Paraplegia ([facility] Discussion: This patient appears to be doing reasonably well at this juncture but her biventricular ICD is at [facility] and has been for nearly a month so we need to get this replaced in the near future. In view of her ongoing foot infection and the fact that this lady is pacer dependent we discussed her case with Dr. Afshan Vale and he is going to place the generator with an antibiotic envelope. Her edema seems to be somewhat worse than it has been in the past and I am going to check a BNP when we get some of her preprocedure labs including a BMP and will except to adjust her diuretics moving forward. In view of the increased edema on the right leg and the fact that she is on no anticoagulants I am going to check to see if she has had a venous study to rule out DVT in the recent past and lower extremity and if not I am going to get that completed. Her blood pressure is adequately controlled today and she is otherwise doing well so I will simply plan to see her again in about 3 months following the placement of her device. PCP: Eddy Camacho MD  
 
  
Past Medical History:  
Diagnosis Date  Acute paraplegia ([facility] 4/20/2017  Benign hypertensive heart disease with systolic CHF, NYHA class 2 ([facility] 9/5/2012  Biventricular implantable cardioverter-defibrillator in situ 04/28/2005 Upgraded to BiV AICD; gen change 4/2008; pocket revision 10/2009; Abdominal - done on 8/22/2012 by Dr. Ze Granger  Cardiac cath 08/15/1996 Patent coronaries. Elev LVEDP. EF 50-55%.  Cardiac echocardiogram 06/23/2015 Ltd study. EF 45-50%. Mild, diffuse hypk. Severe apical hypk. No mass or thrombus was clearly identified, although imaging was suboptimal.    
 Cardiac nuclear imaging test 06/19/2015 Fixed distal apical, distal septal defect more likely due to RV pacing than prior infarct. No ischemia. EF 46%. RWMA c/w RV pacing.   Nondiagnostic EKG on pharm stress test.    
  Cardiovascular lower extremity venous duplex 09/04/2012 Acute, non-occlusive DVT in CFV on right. No DVT on left. No superficial thrombosis bilaterally.  Cardiovascular upper extremity venous duplex 08/27/2012 DVT in axillary vein on left. Left subclavian was not visualized.  Chronic anemia 9/5/2012  Chronic systolic heart failure ([address]  Decreased calculated glomerular filtration rate (GFR) 3/30/2017 Calculated GFR equivalent to that of CKD stage 3 = 30-59 ml/min  Diabetic neuropathy associated with type 2 diabetes mellitus ([address] 6/28/2011  Difficult airway for intubation 08/22/2012  
 see anesthesia airway note  Dyslipidemia 6/28/2011  Gout  History of complete heart block 6/28/2011  History of Coumadin therapy Anticoagulation for DVT of the LUE; Discontinued on 3/30/2017  History of deep venous thrombosis 9/5/2012 Left upper extremity  History of pyelonephritis 3/30/2017  Left bundle branch block (LBBB) on electrocardiogram 6/28/2011  Nonischemic cardiomyopathy ([address] 6/28/2011  Obesity (BMI 35.0-39.9 without comorbidity) 3/13/2017  Obstructive sleep apnea on CPAP 2/7/2012  Psoas abscess, right ([address] 4/20/2017  Psoas hematoma, right, secondary to anticoagulant therapy 3/30/2017  Type 2 diabetes mellitus with diabetic neuropathy ([address] 6/28/2011 Past Surgical History:  
Procedure Laterality Date  HX CARPAL TUNNEL RELEASE  4/07 right [address]  HX OTHER SURGICAL  6/11/2012 AICD revision  HX PACEMAKER  4/28/2005 [employer] AICD Current Outpatient Medications Medication Sig  
 metroNIDAZOLE (FLAGYL) 500 mg tablet TAKE 1 TABLET BY MOUTH 3 TIMES A DAY  predniSONE (DELTASONE) 20 mg tablet TAKE 60MG NIGHT PRIOR TO PROCEDURE AND TAKE 60MG 1 HOUR BEFORE PROCEDURE.   
 diphenhydrAMINE (BENADRYL) 25 mg capsule TAKE 25MG NIGHT PRIOR TO PROCEDURE, AND TAKE 25MG 1 HOUR PRIOR TO PROCEDURE.  fluticasone (FLONASE) 50 mcg/actuation nasal spray 1 spray each nostril BID  lactobacillus sp. 50 billion cpu (BIO-K PLUS) 50 billion cell -375 mg cap capsule Take 1 Cap by mouth daily.  carvedilol (COREG) 6.25 mg tablet Take 1 Tab by mouth two (2) times a day.  furosemide (LASIX) 40 mg tablet 1 tab daily (Patient taking differently: 60 mg daily. 1 tab daily)  metFORMIN ER (GLUCOPHAGE XR) 500 mg tablet  cholecalciferol, VITAMIN D3, (VITAMIN D3) 5,000 unit tab tablet Take  by mouth daily.  gabapentin (NEURONTIN) 300 mg capsule Take 2 Caps by mouth two (2) times a day. Indications: NEUROPATHIC PAIN  
 insulin lispro (HUMALOG) 100 unit/mL injection See sliding scale (Patient taking differently: 4 Units Before breakfast, lunch, and dinner. See sliding scale)  folic acid (FOLVITE) 1 mg tablet Take 1 Tab by mouth daily.  insulin glargine (LANTUS) 100 unit/mL injection 5 Units by SubCUTAneous route nightly. Indications: type 2 diabetes mellitus (Patient taking differently: 10 Units by SubCUTAneous route nightly.)  baclofen (LIORESAL) 10 mg tablet Take 1 Tab by mouth three (3) times daily. No current facility-administered medications for this visit. Allergies Allergen Reactions  Vancomycin Itching  Ampicillin Itching  Bactrim [Sulfamethoxazole-Trimethoprim] Unknown (comments)  Blueberry Swelling Causes throat swelling  Ciprofloxacin Itching  Codeine Other (comments) Jumpy feeling  Crestor [Rosuvastatin] Itching  Darvocet A500 [Propoxyphene N-Acetaminophen] Itching  Demerol [Meperidine] Itching  Levaquin [Levofloxacin] Itching  Lipitor [Atorvastatin] Myalgia  Magnesium Oxide Itching  
  nausea  Minocin [Minocycline] Unknown (comments)  Pcn [Penicillins] Itching  Pravachol [Pravastatin] Swelling Swelling in mouth. Not allergic per patient, takes at home  Shellfish Derived Unknown (comments)  Sulfa (Sulfonamide Antibiotics) Itching  Ultracet [Tramadol-Acetaminophen] Itching  Vicodin [Hydrocodone-Acetaminophen] Unknown (comments)  Vytorin 10-10 [Ezetimibe-Simvastatin] Myalgia  Percodan [Oxycodone Hcl-Oxycodone-Asa] Itching Social  
Social History Tobacco Use  Smoking status: Never Smoker  Smokeless tobacco: Never Used Substance Use Topics  Alcohol use: No  
 
   
Family history: family history includes Cancer in her father. Review of Systems:    
Constitutional: Negative. Respiratory: Negative. Cardiovascular: Positive for orthopnea and leg swelling. Negative for chest pain and palpitations. Gastrointestinal: Negative. Musculoskeletal: Positive for myalgias. Negative for falls and joint pain. Neurological: Negative for dizziness. Physical Exam:  
The patient is an alert, oriented, well developed, well nourished 76 y.o.  female who was in no acute distress at the time of my examination. Visit Vitals /78 Pulse 80 Ht 5' 7\" (1.702 m) SpO2 97% BMI 38.53 kg/m² BP Readings from Last 3 Encounters:  
02/07/19 130/78  
02/07/19 136/78  
01/22/19 136/78 Wt Readings from Last 3 Encounters:  
01/07/19 111.6 kg (246 lb) 12/21/18 108.9 kg (240 lb)  
11/14/18 108.4 kg (239 lb) HEENT: Conjunctivae pink, sclera clear and white. Neck: Supple without masses or tenderness. There is mild thyromegaly. No clear jugular venous distention. Carotid upstrokes are full bilaterally, without bruits. Cardiovascular: Chest is symmetrical with good excursion. There  keloid over the incision in left upper chest in former pacemaker site. [place] is displaced between the MCL and AAL. No lifts, heaves, or thrills felt on palpation.   Normal S1 and S2 with a paradoxical splitting of the S2, with a grade II/VI DEE along the left sternal border, with radiation to the base without diastolic murmur. Lungs: Bibasilar rales Abdomen: Protuberant and soft non tender. It was not adequately evaluated since the patient was in a wheelchair at the time of evaluation. Extremities: 1+ edema on the left leg and 2+ edema on the right leg which is also in a fixed boot type device up the posterior portion of her leg. Carolina Farias Neurologic exam: was not completed but the patient is a paraplegic. Review of Data: Please refer to past medical history for most recent cardiac testing. Results for orders placed or performed in visit on 02/07/19 AMB POC EKG ROUTINE W/ 12 LEADS, INTER & REP     Status: None Narrative Normal sinus mechanism with atrial sensing and biventricular pacing with a rate of 80. Juanita Block D.O., F.A.C.C. [facility] [place] [address] [address] [facility] [address] [place] [phone] PLEASE NOTE:  This document has been produced using voice recognition software. Unrecognized errors in transcription may be present.

## 2019-02-07 NOTE — PROGRESS NOTES
HPI  
 
Pablito Tyler is a 76 y.o. female with relevant past medical history ofCHF, HTN, DM2 with neuropathy (HbA1c 8.7 10/2018), h/o BL LE DVT s/p IVC fileter 5/2018, history of sepsis due to GBS bacteremia secondary to psoas hematoma/abscess complicated by spinal cord infarct, causing paraplegia?(patient reports weakness not paralysis), back in 4/2018. Here for outpatient ID follow up after recent admission for R heel acute on chronic osteomyelitis. Apparently the patient had been admitted to the hospital in August,16-24, 2018 for R foot heel ulcer complicated by acute osteomyelitis, and undergo radical debridement of tissue and bone, with wound vac placement (8/20/18), neg intraoperative cultures. Discharged on oral antibiotics- vantin- cefpodoxime to take 27 more days. The patient says she could not tolerate this antibiotic due to side effects and says she might have taken it maybe for only a week more upon discharge. She said that for a few days her heels seemed to be doing well, but then she started having malaise, fevers, chills, and worsening right heel ulcer. She was admitted again on 10/17/18 until 10/23/18, and was diagnosed again with calcaneus OM, achilles tendon infection, and underwent debridement and wound vac application by podiatry on 10/19/18. Wound cultures on admisson grew ESBL- Kleb penumo, MRSA. VSE and dipheteroids. Intraoperative cultures from 10/19/18 grew ESBL Kleb pneumo, VSE, diptheroids and peptstreptococcus. The patient was initially treated with cefepime and linezolid (10/18-10/21) then briefly transitioned to amox-clav on 10/22/18, ID inpatient, Dr. Pepe Delacruz was consulted, who recommended to treat with daptomycin and meropenem until 12/7/18, given patient microbacterial findings and antibiotic allergies including vancomycin, ampicillin, FQ.   
The patient was changed to linezolid from daptomycin on 11/7/18 due to elevated CPK on daptomycin. She completed approx 3 more weeks on linezolid but it was stopped around 11/30/18 due to severe nausea, vomiting, malaise and decreased H/H, plat, presumably all secondary to the antibiotic, she completed 6 weeks total of MRSA coverage. She completed 7 weeks of antibiotic- meropenem on 12/7/18 from procedure done 10/19/18 as planned. CRP had come down to 0.2 (12/3/18) from 2.4 (10/19) On 12/21/19 we were notified her wound had a foul smell again and more drainage so the patient was recommended to go to the ED. She was admitted from 12/2/7/18 to 1/8/19, for suspected wound infection, cellulitis and osteomyelitis, on 12/27/18 she undergo debridment of R heel and bone and culture, application of ACell graft by podiatry Dr. Avelino Mortimer. Per notes, intra-oprative findings with no evidence of chronic osteomyelitis, but chronic inflammation. Dr. Phillip Mckay (ID) recommended prolonged antibiotics with ciprofloxacin and augmentin. The patient was to have antibiotics liquid form for better tolerance, for 6-12 weeks with follow up here, however she says she stopped them as they ran out a few days after leaving the hospital. 
   
She has h/o penicillin, FQ, minocycline, sulfa allergies, tolerated poorly vancomycin, linezolid and daptomycin. H/o MRSA, ESBL Klebsiella, VSE. 
WC-bone cultures from most recent procedure on R calcaneous + diphtheroids only. CRP on 1/22/19 2.2, Cr 1.31. The patient was recommended to start clindamycin and metronidazol empirically while awaiting lab results and this clinic follow up visit to see if it would help ulcer healed. However, she could not tolerate clindamycin due to GI distres, improved after discontinuing the medication, and her heel ulcer, per patient, is not improving. She denies any worsneing pain or drainage, but no improvement in size. Denies any systemic symptoms.  
We spoke over the phone about a week ago after reviewing labs and I recommended that at this point given her h/o MRSA and ESBL GN infections and in the setting of her multiple antibiotic infections she might need to re-start IV therapy. She agreed with referral to ID for continuity of care, as I am no longer going to be practicing infectious diseases in this clinic. She continues to take metronidazol which she reports good tolerance to. ROS As above included in HPI. Otherwise 11 point review of systems negative including constitutional, skin, HENT, eyes, respiratory, cardiovascular, gastrointestinal, genitourinary, musculoskeletal, endocrine, hematologic, allergy, and neurologic. Past Medical History Past Medical History:  
Diagnosis Date  Acute paraplegia ([address] 4/20/2017  Benign hypertensive heart disease with systolic CHF, NYHA class 2 ([address] 9/5/2012  Biventricular implantable cardioverter-defibrillator in situ 04/28/2005 Upgraded to BiV AICD; gen change 4/2008; pocket revision 10/2009; Abdominal - done on 8/22/2012 by Dr. Hanna Allen  Cardiac cath 08/15/1996 Patent coronaries. Elev LVEDP. EF 50-55%.  Cardiac echocardiogram 06/23/2015 Ltd study. EF 45-50%. Mild, diffuse hypk. Severe apical hypk. No mass or thrombus was clearly identified, although imaging was suboptimal.    
 Cardiac nuclear imaging test 06/19/2015 Fixed distal apical, distal septal defect more likely due to RV pacing than prior infarct. No ischemia. EF 46%. RWMA c/w RV pacing. Nondiagnostic EKG on pharm stress test.    
 Cardiovascular lower extremity venous duplex 09/04/2012 Acute, non-occlusive DVT in CFV on right. No DVT on left. No superficial thrombosis bilaterally.  Cardiovascular upper extremity venous duplex 08/27/2012 DVT in axillary vein on left. Left subclavian was not visualized.  Chronic anemia 9/5/2012  Chronic systolic heart failure ([address]  Decreased calculated glomerular filtration rate (GFR) 3/30/2017 Calculated GFR equivalent to that of CKD stage 3 = 30-59 ml/min  Diabetic neuropathy associated with type 2 diabetes mellitus ([address] 6/28/2011  Difficult airway for intubation 08/22/2012  
 see anesthesia airway note  Dyslipidemia 6/28/2011  Gout  History of complete heart block 6/28/2011  History of Coumadin therapy Anticoagulation for DVT of the LUE; Discontinued on 3/30/2017  History of deep venous thrombosis 9/5/2012 Left upper extremity  History of pyelonephritis 3/30/2017  Left bundle branch block (LBBB) on electrocardiogram 6/28/2011  Nonischemic cardiomyopathy ([address] 6/28/2011  Obesity (BMI 35.0-39.9 without comorbidity) 3/13/2017  Obstructive sleep apnea on CPAP 2/7/2012  Psoas abscess, right ([address] 4/20/2017  Psoas hematoma, right, secondary to anticoagulant therapy 3/30/2017  Type 2 diabetes mellitus with diabetic neuropathy ([address] 6/28/2011 Past Surgical History:  
Procedure Laterality Date  HX CARPAL TUNNEL RELEASE  4/07 right [address]  HX OTHER SURGICAL  6/11/2012 AICD revision  HX PACEMAKER  4/28/2005 [employer] AICD Family History Family History Problem Relation Age of Onset  Cancer Father Leukemia Social History She  reports that  has never smoked. she has never used smokeless tobacco.  
Social History Substance and Sexual Activity Alcohol Use No  
 
 
Immunization History There is no immunization history on file for this patient. Allergies Allergies Allergen Reactions  Vancomycin Itching  Ampicillin Itching  Bactrim [Sulfamethoxazole-Trimethoprim] Unknown (comments)  Blueberry Swelling Causes throat swelling  Ciprofloxacin Itching  Codeine Other (comments) Jumpy feeling  Crestor [Rosuvastatin] Itching  Darvocet A500 [Propoxyphene N-Acetaminophen] Itching  Demerol [Meperidine] Itching  Levaquin [Levofloxacin] Itching  Lipitor [Atorvastatin] Myalgia  Magnesium Oxide Itching  
  nausea  Minocin [Minocycline] Unknown (comments)  Pcn [Penicillins] Itching  Pravachol [Pravastatin] Swelling Swelling in mouth. Not allergic per patient, takes at home  Shellfish Derived Unknown (comments)  Sulfa (Sulfonamide Antibiotics) Itching  Ultracet [Tramadol-Acetaminophen] Itching  Vicodin [Hydrocodone-Acetaminophen] Unknown (comments)  Vytorin 10-10 [Ezetimibe-Simvastatin] Myalgia  Percodan [Oxycodone Hcl-Oxycodone-Asa] Itching Medications Current Outpatient Medications Medication Sig  
 metroNIDAZOLE (FLAGYL) 500 mg tablet Take 1 Tab by mouth three (3) times daily for 30 days.  fluticasone (FLONASE) 50 mcg/actuation nasal spray 1 spray each nostril BID  lactobacillus sp. 50 billion cpu (BIO-K PLUS) 50 billion cell -375 mg cap capsule Take 1 Cap by mouth daily.  carvedilol (COREG) 6.25 mg tablet Take 1 Tab by mouth two (2) times a day.  furosemide (LASIX) 40 mg tablet 1 tab daily  metFORMIN ER (GLUCOPHAGE XR) 500 mg tablet  ondansetron (ZOFRAN ODT) 4 mg disintegrating tablet 1 Tab by SubLINGual route every six (6) hours as needed.  baclofen (LIORESAL) 10 mg tablet Take 1 Tab by mouth three (3) times daily.  cholecalciferol, VITAMIN D3, (VITAMIN D3) 5,000 unit tab tablet Take  by mouth daily.  gabapentin (NEURONTIN) 300 mg capsule Take 2 Caps by mouth two (2) times a day. Indications: NEUROPATHIC PAIN  
 insulin lispro (HUMALOG) 100 unit/mL injection See sliding scale (Patient taking differently: 4 Units Before breakfast, lunch, and dinner. See sliding scale)  folic acid (FOLVITE) 1 mg tablet Take 1 Tab by mouth daily.  insulin glargine (LANTUS) 100 unit/mL injection 5 Units by SubCUTAneous route nightly. Indications: type 2 diabetes mellitus (Patient taking differently: 20 Units by SubCUTAneous route nightly.)  famotidine (PEPCID) 20 mg tablet Take 1 Tab by mouth nightly. (Patient taking differently: Take 20 mg by mouth daily as needed.)  clindamycin (CLEOCIN) 300 mg capsule Take 1 Cap by mouth three (3) times daily for 30 days.  ciprofloxacin (CIPRO) 500 mg/5 mL suspension Take 5 mL by mouth every twelve (12) hours. No current facility-administered medications for this visit. Visit Vitals /78 (BP 1 Location: Right arm, BP Patient Position: Sitting) Pulse 82 Temp 97.8 °F (36.6 °C) (Axillary) Resp 17 Ht 5' 7\" (1.702 m) LMP  (LMP Unknown) SpO2 98% BMI 38.53 kg/m² Body mass index is 38.53 kg/m². Physical Exam  
Constitutional: She is oriented to person, place, and time and well-developed, well-nourished, and in no distress. No distress. HENT:  
Head: Normocephalic and atraumatic. Eyes: Pupils are equal, round, and reactive to light. Cardiovascular: Normal rate and regular rhythm. Pulmonary/Chest: Effort normal and breath sounds normal.  
Abdominal: Soft. Musculoskeletal: She exhibits edema (R heel surrounding edema, no significant warmth or erythema). She exhibits no tenderness. Right heel ulcer with foul smell and purulent discharge. Stage 3. approx size 6x4 cm. Neurological: She is alert and oriented to person, place, and time. Skin: She is not diaphoretic. Nursing note and vitals reviewed. REVIEW OF DATA Labs Hospital Outpatient Visit on 01/22/2019 Component Date Value Ref Range Status  Sodium 01/22/2019 141  136 - 145 mmol/L Final  
 Potassium 01/22/2019 4.0  3.5 - 5.5 mmol/L Final  
 Chloride 01/22/2019 107  100 - 108 mmol/L Final  
 CO2 01/22/2019 24  21 - 32 mmol/L Final  
 Anion gap 01/22/2019 10  3.0 - 18 mmol/L Final  
 Glucose 01/22/2019 90  74 - 99 mg/dL Final  
 BUN 01/22/2019 24* 7.0 - 18 MG/DL Final  
 Creatinine 01/22/2019 1.31* 0.6 - 1.3 MG/DL Final  
 BUN/Creatinine ratio 01/22/2019 18  12 - 20   Final  
  GFR est AA 01/22/2019 49* >60 ml/min/1.73m2 Final  
 GFR est non-AA 01/22/2019 40* >60 ml/min/1.73m2 Final  
 Calcium 01/22/2019 9.3  8.5 - 10.1 MG/DL Final  
 Bilirubin, total 01/22/2019 0.6  0.2 - 1.0 MG/DL Final  
 ALT (SGPT) 01/22/2019 26  13 - 56 U/L Final  
 AST (SGOT) 01/22/2019 29  15 - 37 U/L Final  
 Alk. phosphatase 01/22/2019 111  45 - 117 U/L Final  
 Protein, total 01/22/2019 7.7  6.4 - 8.2 g/dL Final  
 Albumin 01/22/2019 3.7  3.4 - 5.0 g/dL Final  
 Globulin 01/22/2019 4.0  2.0 - 4.0 g/dL Final  
 A-G Ratio 01/22/2019 0.9  0.8 - 1.7   Final  
 C-Reactive protein 01/22/2019 2.2* 0 - 0.3 mg/dL Final  
Admission on 12/27/2018, Discharged on 01/08/2019 No results displayed because visit has over 200 results. CT Results (most recent): 
[place] [facility] [place]18 CT ABD PELV W CONT Narrative CT ABDOMEN AND PELVIS WITH ENHANCEMENT INDICATION: Paraplegic with Admitted with right foot infection suspicious for 
osteomyelitis with dysphagia, nausea, abdominal pain. TECHNIQUE: Axial images obtained of the abdomen and pelvis following the 
uneventful administration of  70 cc's Isovue-300 nonionic intravenous contrast.  
Coronal and sagittal reformatted images of the abdomen and pelvis were obtained. All CT scans at this facility are performed using dose optimization technique as 
appropriate to a performed exam, to include automated exposure control, 
adjustment of the mA and/or kV according to patient size (including appropriate 
matching first site-specific examinations), or use of iterative reconstruction 
technique. COMPARISON: 6/8/2017; CT chest 9/4/2018. ABDOMEN FINDINGS:  
 
Liver: Unremarkable. Spleen: Similar mildly enlarged measuring 13.2 cm in AP diameter. Pancreas: Unremarkable. Biliary: Cholecystectomy. No biliary ductal dilation. Bowel: Moderate colonic stool burden in the colon and rectum. Normal appendix. Peritoneum/ Retroperitoneum: The prior right psoas enlargement and abscess have 
resolved. No ascites or free fluid. Lymph Nodes: Unremarkable. Adrenal Glands: Unremarkable. Kidneys: Cortical scarring right greater than left. There is a similar sized 1.5 
cm exophytic lesion at the right upper renal pole with density greater than 
water since at least 6/8/2017. Size is reassuring for benign or indolent lesion. There is associated cortical scarring. Similar 6 cm exophytic left lower pole 
renal cyst. Similar to small to characterize right interpolar renal lesion. Vessels: Mild atherosclerosis. Infrarenal IVC filter in place. IVC and renal 
veins are flattened. PELVIS FINDINGS:  
 
Bladder/ Pelvic Organs: Bladder is decompressed with Krueger in place. Status post 
hysterectomy. No suspicious adnexal lesions. No free fluid in the pelvis. Lung Base: Status post CABG. Epicardial pacer leads. Left anterior abdominal 
wall cardiac device. Similar region of subsegmental atelectasis or scarring at 
the right greater than left lung bases. There is associated mild bronchiectasis. Bones: Sarcopenia compatible with history of paraplegia. Incompletely visualized 
median sternotomy. Lumbar facet hypertrophy. Osteopenia. There is a linear 3.8 x 
1.6 cm soft tissue density within the soft tissues overlying the left 
coccyx/medial gluteal fold (91). No fluid density. Underlying cortex is intact. Degenerative changes left greater than right hips. Degenerative disc disease. Impression IMPRESSION: 
 
1. Moderate colonic stool burden; correlate for constipation. 2.  Indeterminate new superficial soft tissue density near the coccyx/left 
medial gluteal fold without fluid density. This could represent phlegmon or 
scarring. No underlying findings of osteomyelitis. 3.  Resolved right psoas abscess. 4.  Hypovolemia. 5.  Similar mild splenomegaly. 6.  Lung base bronchiectasis, likely sequela of prior infection. XR Results (most recent): 
Results from Hospital Encounter encounter on 12/27/18 XR FOOT RT AP/LAT Narrative EXAM:  Right foot series CLINICAL INDICATION:  Infection to the right foot COMPARISON:  12/21/18 TECHNIQUE:  Frontal and lateral views of the right foot FINDINGS:  A large soft tissue defect is noted at the posterior heel region, 
with unusually foreshortened appearance of the calcaneus and markedly pronounced 
sclerotic changes to the calcaneus. Possible subtle cortical resorption in the 
inferior aspect of the calcaneus. There is diffuse bony mineralization. Impression IMPRESSION:   
 
1. Soft tissue defect in the posterior heel region. 2.  Foreshortened calcaneus with sharply marginated posterior calcaneal region. Query recent surgical intervention. Possible subtle cortical defect at the 
inferior margin. Developing infectious process cannot be excluded. CT All Micro Results None DIAGNOSIS AND PLAN Patient Active Problem List  
Diagnosis Code  Nonischemic cardiomyopathy ([facility]) I42.8  History of complete heart block Z86.79  Biventricular implantable cardioverter-defibrillator in situ Z95.810  Left bundle branch block (LBBB) on electrocardiogram I44.7  Type 2 diabetes mellitus with diabetic neuropathy ([facility]) E11.40  Dyslipidemia E78.5  Diabetic neuropathy associated with type 2 diabetes mellitus ([facility]) E11.40  Obstructive sleep apnea on CPAP G47.33, Z99.89  
 AICD generator infection ([facility] T82. 7XXA  Difficult airway for intubation T88. 4XXA  Benign hypertensive heart disease with systolic CHF, NYHA class 2 ([facility]) I11.0, I50.20  Decreased calculated glomerular filtration rate (GFR) R94.4  Chronic anemia D64.9  
 History of deep venous thrombosis Z86.718  Anticoagulated on Coumadin Z51.81, Z79.01  
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  
  Obesity (BMI 35.0-39.9 without comorbidity) E66.9  
 History of pyelonephritis Z87.448  Iliopsoas muscle hematoma S70.10XA  Psoas hematoma, right, secondary to anticoagulant therapy S30. Sonia Nicole  Impaired mobility and ADLs Z74.09  
 History of Coumadin therapy Z92.29  
 Gout M10.9  Acute paraplegia (HCC) G82.20  Sepsis ([address] A41.9  Psoas abscess, right ([address] K68.12  
 Krueger catheter in place on admission Z96.0  
 Urinary tract infection due to Enterococcus N39.0, B95.2  Group B streptococcal infection A49.1  Hypervolemia E87.70  Anemia D64.9  Biventricular cardiac pacemaker in situ Z95.0  Neurogenic bladder N31.9  Type 2 diabetes with nephropathy (HCC) E11.21  
 Obesity (BMI 30.0-34. 9) E66.9  Severe obesity (BMI 35.0-39. 9) with comorbidity ([address] E66.01  
 Osteomyelitis ([address] M86.9  Foot osteomyelitis, right ([address] M86.9  Heel ulcer ([address] L97.409  Infected wound T14. 8XXA, L08.9 1. Right heel non healing ulcer 2. Diabetic foot infection History of admission on 10/17/18 until 10/23/18, for R foot calcaneus OM, achilles tendon infection, s/p debridement and wound vac application by podiatry on 10/19/18. Wound cultures on 10/17/18 grew ESBL- Kleb penumo, MRSA. VSE and dipheteroids. Intraoperative cultures from 10/19/18 grew ESBL Kleb pneumo, VSE, diptheroids and peptstreptococcus. The patient was initially treated with cefepime and linezolid (10/18-10/21) then briefly transitioned to amox-clav on 10/22/18, ID inpatient, Dr. Mary Mcelroy was consulted, who recommended to treat with daptomycin and meropenem until 12/7/18, given patient microbacterial findings and antibiotic allergies including vancomycin, ampicillin, FQ. The patient reported mild itching with meropenem but it was relieved by atarax. The patient was changed to linezolid from daptomycin on 11/7/18 due to elevated CPK on daptomycin.  She completed approx 3 more weeks on linezolid but it was stopped around 11/30/18 due to severe nausea, vomiting, malaise and decreased H/H, plat, presumably all secondary to the antibiotic, she completed 6 weeks total of MRSA coverage. She felt much better after discontinuation of linezolid. She completed 7 weeks of antibiotic- meropenem on 12/7/18 from procedure done 10/19/18 as planned. CRP had come down to 0.2 (12/3/18) from 2.4 (10/19) On 12/21/19 we were notified her wound had a foul smell again and more drainage so the patient was recommended to go to the ED. She was admitted from 12/2/7/18 to 1/8/19, for suspected wound infection, cellulitis and osteomyelitis, on 12/27/18 she undergo debridment of R heel and bone and culture, application of ACell graft by podiatry Dr. Puja Brar. Per notes, intra-oprative findings with no evidence of chronic osteomyelitis, but chronic inflammation. Dr. Grace Murillo (ID) recommended prolonged antibiotics with ciprofloxacin and augmentin. The patient was to have antibiotics liquid form for better tolerance, for 6-12 weeks with follow up here, however she says she has been off all antibiotics for about 5 days now, as they run out. She reports no pain, no systemic symptoms, but has been told by wound care that she has increased drainage. She has h/o penicillin, FQ, minocycline, sulfa allergies, tolerated poorly vancomycin, linezolid and daptomycin. H/o MRSA, ESBL Klebsiella, VSE. 
WC-bone cultures from most recent procedure on R calcaneous + diphtheroids only. CRP on 1/22/19 2.2, Cr 1.31. The patient could not tolerate clindamycin due to GI distress, improved after discontinuing the medication, presently on metronidazol. Heel ulcer per patient without significant change. No worsening pain or drainage, but no improvement in size. Denies any systemic symptoms. Plan As I discussed with the patient on our previous encounter, that if the wound is not improving, we may need to place PICC line and re-start broad-spectrum antibiotic therapy, including ESBL kleb pneumo coverage and MRSA coverage found in cultures before. Unfortunately, I am no longer providing infectious disease services in clinic, so I have referred the patient to another provider in the area for continuity of care, second opinion. The patient agreed and is awaiting call back to schedule visit. Difficult case due to comorbidities and allergy history. I recommended for now to continue with metronidazol as prescribed as she is clinically stable, but if having any clinical deterioration, including fevers, chills, malaise, weakness, severe pain etc, to report to the ER for evaluation. Follow-up Disposition: 
Return if symptoms worsen or fail to improve. Joanne Walton MD

## 2019-02-07 NOTE — PROGRESS NOTES
Nikolas Chappell presents today for Chief Complaint Patient presents with  Follow-up 3 month  Leg Swelling  
  both legs everyday Nikolas Chappell preferred language for health care discussion is english/other. Is someone accompanying this pt? No  
 
Is the patient using any DME equipment during OV? Wheel chair Depression Screening: PHQ over the last two weeks 11/14/2018 Little interest or pleasure in doing things Not at all Feeling down, depressed, irritable, or hopeless Not at all Total Score PHQ 2 0 Learning Assessment: 
Learning Assessment 11/1/2016 PRIMARY LEARNER Patient HIGHEST LEVEL OF EDUCATION - PRIMARY LEARNER  -  
BARRIERS PRIMARY LEARNER -  
CO-LEARNER CAREGIVER -  
PRIMARY LANGUAGE ENGLISH  
LEARNER PREFERENCE PRIMARY DEMONSTRATION  
ANSWERED BY Patient RELATIONSHIP SELF Abuse Screening: No flowsheet data found. Fall Risk Fall Risk Assessment, last 12 mths 11/14/2018 Able to walk? Yes Fall in past 12 months? No  
 
 
Pt currently taking Antiplatelet therapy? No  
 
Coordination of Care: 1. Have you been to the ER, urgent care clinic since your last visit? Hospitalized since your last visit? No  
 
2. Have you seen or consulted any other health care providers outside of the [address] [place] [address] since your last visit? Include any pap smears or colon screening.  No

## 2019-02-07 NOTE — PROGRESS NOTES
Review of Systems Constitutional: Negative. Respiratory: Negative. Cardiovascular: Positive for orthopnea and leg swelling. Negative for chest pain and palpitations. Gastrointestinal: Negative. Musculoskeletal: Positive for myalgias. Negative for falls and joint pain. Neurological: Negative for dizziness.

## 2019-02-07 NOTE — TELEPHONE ENCOUNTER
After patient office visit today, Dr. Jeramy Palacios ordered lower extremity doppler to evaluate for DVT related to leg swelling. Orders placed and given to . This has been fully explained to the patient, who indicates understanding.

## 2019-02-08 NOTE — PROGRESS NOTES
I have personally seen and evaluated the device findings. Interrogation reviewed and I agree with assessment. Aaliyah Barry

## 2019-02-12 ENCOUNTER — TELEPHONE (OUTPATIENT)
Dept: INTERNAL MEDICINE CLINIC | Age: 69
End: 2019-02-12

## 2019-02-12 NOTE — TELEPHONE ENCOUNTER
Pt calling says she tried to get appt to see Tammy Nuñez for ID but they say they need referral, last note and last labs before they can set up appt. Says they never received. Please resend.

## 2019-02-20 ENCOUNTER — HOSPITAL ENCOUNTER (OUTPATIENT)
Dept: VASCULAR SURGERY | Age: 69
Discharge: HOME | End: 2019-02-20
Attending: INTERNAL MEDICINE
Payer: COMMERCIAL

## 2019-02-20 ENCOUNTER — HOSPITAL ENCOUNTER (OUTPATIENT)
Dept: GENERAL RADIOLOGY | Age: 69
Discharge: HOME | End: 2019-02-20
Attending: INTERNAL MEDICINE
Payer: COMMERCIAL

## 2019-02-20 ENCOUNTER — HOSPITAL ENCOUNTER (OUTPATIENT)
Dept: PREADMISSION TESTING | Age: 69
Discharge: HOME | End: 2019-02-20
Payer: COMMERCIAL

## 2019-02-20 DIAGNOSIS — I42.8 NONISCHEMIC CARDIOMYOPATHY (HCC): Chronic | ICD-10-CM

## 2019-02-20 DIAGNOSIS — M79.89 LEG SWELLING: ICD-10-CM

## 2019-02-20 DIAGNOSIS — O22.30 DVT (DEEP VEIN THROMBOSIS) IN PREGNANCY: ICD-10-CM

## 2019-02-20 LAB
ALBUMIN SERPL-MCNC: 3.6 G/DL (ref 3.4–5)
ALBUMIN/GLOB SERPL: 0.9 {RATIO} (ref 0.8–1.7)
ALP SERPL-CCNC: 72 U/L (ref 45–117)
ALT SERPL-CCNC: 21 U/L (ref 13–56)
ANION GAP SERPL CALC-SCNC: 5 MMOL/L (ref 3–18)
AST SERPL-CCNC: 24 U/L (ref 15–37)
BASOPHILS # BLD: 0 K/UL (ref 0–0.1)
BASOPHILS NFR BLD: 0 % (ref 0–2)
BILIRUB SERPL-MCNC: 0.5 MG/DL (ref 0.2–1)
BNP SERPL-MCNC: 3904 PG/ML (ref 0–900)
BUN SERPL-MCNC: 19 MG/DL (ref 7–18)
BUN/CREAT SERPL: 16 (ref 12–20)
CALCIUM SERPL-MCNC: 9.3 MG/DL (ref 8.5–10.1)
CHLORIDE SERPL-SCNC: 106 MMOL/L (ref 100–108)
CO2 SERPL-SCNC: 30 MMOL/L (ref 21–32)
CREAT SERPL-MCNC: 1.18 MG/DL (ref 0.6–1.3)
DIFFERENTIAL METHOD BLD: ABNORMAL
EOSINOPHIL # BLD: 0.2 K/UL (ref 0–0.4)
EOSINOPHIL NFR BLD: 3 % (ref 0–5)
ERYTHROCYTE [DISTWIDTH] IN BLOOD BY AUTOMATED COUNT: 15 % (ref 11.6–14.5)
GLOBULIN SER CALC-MCNC: 3.9 G/DL (ref 2–4)
GLUCOSE SERPL-MCNC: 92 MG/DL (ref 74–99)
HCT VFR BLD AUTO: 35.5 % (ref 35–45)
HGB BLD-MCNC: 12 G/DL (ref 12–16)
INR PPP: 0.9 (ref 0.8–1.2)
LYMPHOCYTES # BLD: 1.8 K/UL (ref 0.9–3.6)
LYMPHOCYTES NFR BLD: 32 % (ref 21–52)
MCH RBC QN AUTO: 28.2 PG (ref 24–34)
MCHC RBC AUTO-ENTMCNC: 33.8 G/DL (ref 31–37)
MCV RBC AUTO: 83.5 FL (ref 74–97)
MONOCYTES # BLD: 0.4 K/UL (ref 0.05–1.2)
MONOCYTES NFR BLD: 7 % (ref 3–10)
NEUTS SEG # BLD: 3.3 K/UL (ref 1.8–8)
NEUTS SEG NFR BLD: 58 % (ref 40–73)
PLATELET # BLD AUTO: 207 K/UL (ref 135–420)
PMV BLD AUTO: 9.5 FL (ref 9.2–11.8)
POTASSIUM SERPL-SCNC: 3.5 MMOL/L (ref 3.5–5.5)
PROT SERPL-MCNC: 7.5 G/DL (ref 6.4–8.2)
PROTHROMBIN TIME: 12.3 SEC (ref 11.5–15.2)
RBC # BLD AUTO: 4.25 M/UL (ref 4.2–5.3)
SODIUM SERPL-SCNC: 141 MMOL/L (ref 136–145)
WBC # BLD AUTO: 5.6 K/UL (ref 4.6–13.2)

## 2019-02-20 PROCEDURE — 83880 ASSAY OF NATRIURETIC PEPTIDE: CPT

## 2019-02-20 PROCEDURE — 36415 COLL VENOUS BLD VENIPUNCTURE: CPT

## 2019-02-20 PROCEDURE — 85025 COMPLETE CBC W/AUTO DIFF WBC: CPT

## 2019-02-20 PROCEDURE — 93970 EXTREMITY STUDY: CPT

## 2019-02-20 PROCEDURE — 80053 COMPREHEN METABOLIC PANEL: CPT

## 2019-02-20 PROCEDURE — 71046 X-RAY EXAM CHEST 2 VIEWS: CPT

## 2019-02-20 PROCEDURE — 85610 PROTHROMBIN TIME: CPT

## 2019-02-21 ENCOUNTER — TELEPHONE (OUTPATIENT)
Dept: INTERNAL MEDICINE CLINIC | Age: 69
End: 2019-02-21

## 2019-02-21 RX ORDER — METRONIDAZOLE 500 MG/1
500 TABLET ORAL 3 TIMES DAILY
Qty: 90 TAB | Refills: 0 | Status: SHIPPED | OUTPATIENT
Start: 2019-02-21 | End: 2019-03-21

## 2019-02-21 NOTE — TELEPHONE ENCOUNTER
Pt said she was told she would be referred to ID  ,but has not heard anything from them yet please call her back [phone]

## 2019-02-21 NOTE — TELEPHONE ENCOUNTER
Last Visit: 02/07/2019 with MD Alen Hernandez Next Appointment: none Previous Refill Encounter(s): 01/22/2019 per MD Alen Hernandez #[id number] Requested Prescriptions Pending Prescriptions Disp Refills  metroNIDAZOLE (FLAGYL) 500 mg tablet 90 Tab 0 Sig: Take 1 Tab by mouth three (3) times daily.

## 2019-02-22 NOTE — TELEPHONE ENCOUNTER
Could you please help me follow up on referral status, done 1/31/19 to Dr. Elisabeth Cervantes. Please give me and the patient an updated. Thanks

## 2019-02-25 NOTE — TELEPHONE ENCOUNTER
Called and spoke to Mauro Morejon at [place] [address] and the providers are still reviewing the case and Mauro Morejon stated that if they feel that they can assist the patient then they will give the patient a call to schedule and if they can't assist the patient they will still give the patient a call to advise why. If they do not feel they can assist the patients needs then they will fax us a reasoning why. Called and spoke to the patient about the above information and the patient understood and didn't have any additional questions.

## 2019-02-25 NOTE — H&P
Plan pacemaker generator changeout, pacemaker dependent. Long h/o recurrent infections. Allergic to vanc and ampicillin. Will plan clindamycin. Discussed increased risk for infection. HPI: I saw Barrington Anne Martinez in my office today in cardiovascular evaluation regarding her dilated nonischemic cardiomyopathy. Ms. Carmela Martinez is a very pleasant 78 year old fair skinned  female with history of a dilated cardiomyopathy with mild left ventricular dysfunction with an ejection fraction of 40-45% range with widely patent coronary arteries on a cardiac catheterization back in August of 1996. Her very last echocardiogram in June of 2015 showed that her ejection fraction was still very mildly decreased at 45-50%. She did have a biventricular AICD placed, but due to trauma and infection in that area that had to be removed and now she has a biventricular pacemaker with a pulse generator residing in her left upper quadrant, placed back in October of 2012.  
   
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 
  
She comes in today and unfortunately she is still being treated for an infection in her right foot and her has pacer battery is at [facility] and she is pacer dependent with the battery at recommended replacement time since January 13, 2019. Since the patient is pacer dependent she will need to have her generator changed despite her ongoing infection which apparently is improving. She relates that she is doing reasonably well without any cardiovascular complaints except for chronic lower extremity edema. She does sleep with her head up at night chronically but has had no paroxysmal nocturnal dyspnea. 
  
    
Encounter Diagnoses Name Primary?  Nonischemic cardiomyopathy ([address] Yes  Chronic systolic heart failure (HCC)    
  Left bundle branch block (LBBB) on electrocardiogram    
 Biventricular implantable cardioverter-defibrillator in situ and at [facility]    
 Benign hypertensive heart disease with systolic CHF, NYHA class 2 (HCC)    
 Paraplegia ([facility]    
  
  
Discussion: This patient appears to be doing reasonably well at this juncture but her biventricular ICD is at [facility] and has been for nearly a month so we need to get this replaced in the near future. In view of her ongoing foot infection and the fact that this lady is pacer dependent we discussed her case with Dr. Afshan Vale and he is going to place the generator with an antibiotic envelope. 
  
Her edema seems to be somewhat worse than it has been in the past and I am going to check a BNP when we get some of her preprocedure labs including a BMP and will except to adjust her diuretics moving forward. 
  
In view of the increased edema on the right leg and the fact that she is on no anticoagulants I am going to check to see if she has had a venous study to rule out DVT in the recent past and lower extremity and if not I am going to get that completed. 
  
Her blood pressure is adequately controlled today and she is otherwise doing well so I will simply plan to see her again in about 3 months following the placement of her device. 
  
PCP: Eddy Camacho MD  
  
  
    
Past Medical History:  
Diagnosis Date  Acute paraplegia ([facility] 4/20/2017  Benign hypertensive heart disease with systolic CHF, NYHA class 2 ([facility] 9/5/2012  Biventricular implantable cardioverter-defibrillator in situ 04/28/2005  
  Upgraded to BiV AICD; gen change 4/2008; pocket revision 10/2009; Abdominal - done on 8/22/2012 by Dr. Ze Granger  Cardiac cath 08/15/1996  
  Patent coronaries. Elev LVEDP. EF 50-55%.  Cardiac echocardiogram 06/23/2015  
  Ltd study. EF 45-50%. Mild, diffuse hypk. Severe apical hypk.   No mass or thrombus was clearly identified, although imaging was suboptimal.  Cardiac nuclear imaging test 06/19/2015  
  Fixed distal apical, distal septal defect more likely due to RV pacing than prior infarct. No ischemia. EF 46%. RWMA c/w RV pacing. Nondiagnostic EKG on pharm stress test.    
 Cardiovascular lower extremity venous duplex 09/04/2012  
  Acute, non-occlusive DVT in CFV on right. No DVT on left. No superficial thrombosis bilaterally.  Cardiovascular upper extremity venous duplex 08/27/2012  
  DVT in axillary vein on left. Left subclavian was not visualized.  Chronic anemia 9/5/2012  Chronic systolic heart failure (HCC)    
 Decreased calculated glomerular filtration rate (GFR) 3/30/2017  
  Calculated GFR equivalent to that of CKD stage 3 = 30-59 ml/min  Diabetic neuropathy associated with type 2 diabetes mellitus ([address] 6/28/2011  Difficult airway for intubation 08/22/2012  
  see anesthesia airway note  Dyslipidemia 6/28/2011  Gout    
 History of complete heart block 6/28/2011  History of Coumadin therapy    
  Anticoagulation for DVT of the LUE; Discontinued on 3/30/2017  History of deep venous thrombosis 9/5/2012  
  Left upper extremity  History of pyelonephritis 3/30/2017  Left bundle branch block (LBBB) on electrocardiogram 6/28/2011  Nonischemic cardiomyopathy ([address] 6/28/2011  Obesity (BMI 35.0-39.9 without comorbidity) 3/13/2017  Obstructive sleep apnea on CPAP 2/7/2012  Psoas abscess, right ([address] 4/20/2017  Psoas hematoma, right, secondary to anticoagulant therapy 3/30/2017  Type 2 diabetes mellitus with diabetic neuropathy ([address] 6/28/2011  
  
  
  
     
Past Surgical History:  
Procedure Laterality Date  HX CARPAL TUNNEL RELEASE   4/07  
  right Karli [id number]  HX OTHER SURGICAL   6/11/2012  
  AICD revision  HX PACEMAKER   4/28/2005  
  [employer] AICD  
  
    
Current Outpatient Medications Medication Sig  
  metroNIDAZOLE (FLAGYL) 500 mg tablet TAKE 1 TABLET BY MOUTH 3 TIMES A DAY  predniSONE (DELTASONE) 20 mg tablet TAKE 60MG NIGHT PRIOR TO PROCEDURE AND TAKE 60MG 1 HOUR BEFORE PROCEDURE.  diphenhydrAMINE (BENADRYL) 25 mg capsule TAKE 25MG NIGHT PRIOR TO PROCEDURE, AND TAKE 25MG 1 HOUR PRIOR TO PROCEDURE.  fluticasone (FLONASE) 50 mcg/actuation nasal spray 1 spray each nostril BID  lactobacillus sp. 50 billion cpu (BIO-K PLUS) 50 billion cell -375 mg cap capsule Take 1 Cap by mouth daily.  carvedilol (COREG) 6.25 mg tablet Take 1 Tab by mouth two (2) times a day.  furosemide (LASIX) 40 mg tablet 1 tab daily (Patient taking differently: 60 mg daily. 1 tab daily)  metFORMIN ER (GLUCOPHAGE XR) 500 mg tablet    
 cholecalciferol, VITAMIN D3, (VITAMIN D3) 5,000 unit tab tablet Take  by mouth daily.  gabapentin (NEURONTIN) 300 mg capsule Take 2 Caps by mouth two (2) times a day. Indications: NEUROPATHIC PAIN  
 insulin lispro (HUMALOG) 100 unit/mL injection See sliding scale (Patient taking differently: 4 Units Before breakfast, lunch, and dinner. See sliding scale)  folic acid (FOLVITE) 1 mg tablet Take 1 Tab by mouth daily.  insulin glargine (LANTUS) 100 unit/mL injection 5 Units by SubCUTAneous route nightly. Indications: type 2 diabetes mellitus (Patient taking differently: 10 Units by SubCUTAneous route nightly.)  baclofen (LIORESAL) 10 mg tablet Take 1 Tab by mouth three (3) times daily.  
  
No current facility-administered medications for this visit.   
  
  
  
     
Allergies Allergen Reactions  Vancomycin Itching  Ampicillin Itching  Bactrim [Sulfamethoxazole-Trimethoprim] Unknown (comments)  Blueberry Swelling  
    Causes throat swelling  Ciprofloxacin Itching  Codeine Other (comments)  
    Jumpy feeling  Crestor [Rosuvastatin] Itching  Darvocet A500 [Propoxyphene N-Acetaminophen] Itching  Demerol [Meperidine] Itching  Levaquin [Levofloxacin] Itching  Lipitor [Atorvastatin] Myalgia  Magnesium Oxide Itching  
    nausea  Minocin [Minocycline] Unknown (comments)  Pcn [Penicillins] Itching  Pravachol [Pravastatin] Swelling  
    Swelling in mouth. Not allergic per patient, takes at home  Shellfish Derived Unknown (comments)  Sulfa (Sulfonamide Antibiotics) Itching  Ultracet [Tramadol-Acetaminophen] Itching  Vicodin [Hydrocodone-Acetaminophen] Unknown (comments)  Vytorin 10-10 [Ezetimibe-Simvastatin] Myalgia  Percodan [Oxycodone Hcl-Oxycodone-Asa] Itching  
  
  
  
Social  
Social History  
  
    
Tobacco Use  Smoking status: Never Smoker  Smokeless tobacco: Never Used Substance Use Topics  Alcohol use: No  
  
   
Family history: family history includes Cancer in her father.   
  
Review of Systems:    
Constitutional: Negative. Respiratory: Negative. Cardiovascular: Positive for orthopnea and leg swelling. Negative for chest pain and palpitations. Gastrointestinal: Negative. Musculoskeletal: Positive for myalgias. Negative for falls and joint pain. Neurological: Negative for dizziness.  
  
  
Physical Exam:  
The patient is an alert, oriented, well developed, well nourished 76 y.o.  female who was in no acute distress at the time of my examination. Visit Vitals /78 Pulse 80 Ht 5' 7\" (1.702 m) SpO2 97% BMI 38.53 kg/m² BP Readings from Last 3 Encounters:  
02/07/19 130/78  
02/07/19 136/78  
01/22/19 136/78  
  
  
   
Wt Readings from Last 3 Encounters:  
01/07/19 111.6 kg (246 lb) 12/21/18 108.9 kg (240 lb)  
11/14/18 108.4 kg (239 lb)  
  
 
HEENT: Conjunctivae pink, sclera clear and white. Neck: Supple without masses or tenderness. There is mild thyromegaly. No clear jugular venous distention. Carotid upstrokes are full bilaterally, without bruits. Cardiovascular: Chest is symmetrical with good excursion. There  keloid over the incision in left upper chest in former pacemaker site. Castana is displaced between the MCL and AAL. No lifts, heaves, or thrills felt on palpation. Normal S1 and S2 with a paradoxical splitting of the S2, with a grade II/VI DEE along the left sternal border, with radiation to the base without diastolic murmur. Lungs: Bibasilar rales Abdomen: Protuberant and soft non tender. It was not adequately evaluated since the patient was in a wheelchair at the time of evaluation. Extremities: 1+ edema on the left leg and 2+ edema on the right leg which is also in a fixed boot type device up the posterior portion of her leg. Arbutus Ring Neurologic exam: was not completed but the patient is a paraplegic. 
  
Review of Data: Please refer to past medical history for most recent cardiac testing. 
  
   
Results for orders placed or performed in visit on 02/07/19 AMB POC EKG ROUTINE W/ 12 LEADS, INTER & REP     Status: None  
  Narrative  
  Normal sinus mechanism with atrial sensing and biventricular pacing with a rate of 80.  
  
  
  
Kristina Yee D.O., F.A.C.C. [facility] [address] [place] [address] [address] [facility] [address]

## 2019-02-26 ENCOUNTER — ANESTHESIA EVENT (OUTPATIENT)
Dept: CARDIAC CATH/INVASIVE PROCEDURES | Age: 69
End: 2019-02-26
Payer: COMMERCIAL

## 2019-02-26 ENCOUNTER — ANESTHESIA (OUTPATIENT)
Dept: CARDIAC CATH/INVASIVE PROCEDURES | Age: 69
End: 2019-02-26
Payer: COMMERCIAL

## 2019-02-26 ENCOUNTER — HOSPITAL ENCOUNTER (OUTPATIENT)
Age: 69
Setting detail: OUTPATIENT SURGERY
Discharge: HOME | End: 2019-02-26
Attending: INTERNAL MEDICINE | Admitting: INTERNAL MEDICINE
Payer: COMMERCIAL

## 2019-02-26 VITALS
SYSTOLIC BLOOD PRESSURE: 166 MMHG | BODY MASS INDEX: 35.77 KG/M2 | HEART RATE: 98 BPM | OXYGEN SATURATION: 94 % | RESPIRATION RATE: 17 BRPM | HEIGHT: 68 IN | DIASTOLIC BLOOD PRESSURE: 93 MMHG | WEIGHT: 236 LBS

## 2019-02-26 DIAGNOSIS — Z45.010 ENCOUNTER FOR PACEMAKER AT END OF BATTERY LIFE: ICD-10-CM

## 2019-02-26 LAB — GLUCOSE BLD STRIP.AUTO-MCNC: 212 MG/DL (ref 70–110)

## 2019-02-26 PROCEDURE — 77030002933 HC SUT MCRYL J&J -A: Performed by: INTERNAL MEDICINE

## 2019-02-26 PROCEDURE — 77030028698 HC BLD TISS PLSM MEDT -D: Performed by: INTERNAL MEDICINE

## 2019-02-26 PROCEDURE — C2621 PMKR, OTHER THAN SING/DUAL: HCPCS | Performed by: INTERNAL MEDICINE

## 2019-02-26 PROCEDURE — 77030031139 HC SUT VCRL2 J&J -A: Performed by: INTERNAL MEDICINE

## 2019-02-26 PROCEDURE — 77030018673: Performed by: INTERNAL MEDICINE

## 2019-02-26 PROCEDURE — 74011250636 HC RX REV CODE- 250/636

## 2019-02-26 PROCEDURE — 33229 REMV&REPLC PM GEN MULT LEADS: CPT | Performed by: INTERNAL MEDICINE

## 2019-02-26 PROCEDURE — 77030002996 HC SUT SLK J&J -A: Performed by: INTERNAL MEDICINE

## 2019-02-26 PROCEDURE — 87070 CULTURE OTHR SPECIMN AEROBIC: CPT

## 2019-02-26 PROCEDURE — 77030037029 HC IMPL ENV ICD ANTIBACT ABSRB TYRX MEDT -G: Performed by: INTERNAL MEDICINE

## 2019-02-26 PROCEDURE — 77030018729 HC ELECTRD DEFIB PAD CARD -B: Performed by: INTERNAL MEDICINE

## 2019-02-26 PROCEDURE — 74011000250 HC RX REV CODE- 250: Performed by: INTERNAL MEDICINE

## 2019-02-26 PROCEDURE — 33228 REMV&REPLC PM GEN DUAL LEAD: CPT | Performed by: INTERNAL MEDICINE

## 2019-02-26 PROCEDURE — 74011000250 HC RX REV CODE- 250

## 2019-02-26 PROCEDURE — 82962 GLUCOSE BLOOD TEST: CPT

## 2019-02-26 PROCEDURE — 76060000033 HC ANESTHESIA 1 TO 1.5 HR: Performed by: INTERNAL MEDICINE

## 2019-02-26 PROCEDURE — 74011000258 HC RX REV CODE- 258: Performed by: INTERNAL MEDICINE

## 2019-02-26 PROCEDURE — 74011250636 HC RX REV CODE- 250/636: Performed by: INTERNAL MEDICINE

## 2019-02-26 DEVICE — IMPLANTABLE DEVICE: Type: IMPLANTABLE DEVICE | Site: ABDOMEN | Status: FUNCTIONAL

## 2019-02-26 DEVICE — ENVELOPE CMRM6133 ABSORB LRG US
Type: IMPLANTABLE DEVICE | Site: ABDOMEN | Status: FUNCTIONAL
Brand: TYRX™

## 2019-02-26 RX ORDER — GLYCOPYRROLATE 0.2 MG/ML
INJECTION INTRAMUSCULAR; INTRAVENOUS AS NEEDED
Status: DISCONTINUED | OUTPATIENT
Start: 2019-02-26 | End: 2019-02-26 | Stop reason: HOSPADM

## 2019-02-26 RX ORDER — FENTANYL CITRATE 50 UG/ML
INJECTION, SOLUTION INTRAMUSCULAR; INTRAVENOUS AS NEEDED
Status: DISCONTINUED | OUTPATIENT
Start: 2019-02-26 | End: 2019-02-26 | Stop reason: HOSPADM

## 2019-02-26 RX ORDER — DEXTROSE 50 % IN WATER (D50W) INTRAVENOUS SYRINGE
25-50 AS NEEDED
Status: CANCELLED | OUTPATIENT
Start: 2019-02-26

## 2019-02-26 RX ORDER — MAGNESIUM SULFATE 100 %
4 CRYSTALS MISCELLANEOUS AS NEEDED
Status: CANCELLED | OUTPATIENT
Start: 2019-02-26

## 2019-02-26 RX ORDER — ONDANSETRON 2 MG/ML
INJECTION INTRAMUSCULAR; INTRAVENOUS AS NEEDED
Status: DISCONTINUED | OUTPATIENT
Start: 2019-02-26 | End: 2019-02-26 | Stop reason: HOSPADM

## 2019-02-26 RX ORDER — LIDOCAINE HYDROCHLORIDE 20 MG/ML
INJECTION, SOLUTION EPIDURAL; INFILTRATION; INTRACAUDAL; PERINEURAL AS NEEDED
Status: DISCONTINUED | OUTPATIENT
Start: 2019-02-26 | End: 2019-02-26 | Stop reason: HOSPADM

## 2019-02-26 RX ORDER — ONDANSETRON 2 MG/ML
4 INJECTION INTRAMUSCULAR; INTRAVENOUS ONCE
Status: CANCELLED | OUTPATIENT
Start: 2019-02-26 | End: 2019-02-26

## 2019-02-26 RX ORDER — SODIUM CHLORIDE 0.9 % (FLUSH) 0.9 %
5-40 SYRINGE (ML) INJECTION AS NEEDED
Status: CANCELLED | OUTPATIENT
Start: 2019-02-26

## 2019-02-26 RX ORDER — SODIUM CHLORIDE 0.9 % (FLUSH) 0.9 %
5-40 SYRINGE (ML) INJECTION EVERY 8 HOURS
Status: CANCELLED | OUTPATIENT
Start: 2019-02-26

## 2019-02-26 RX ORDER — PROPOFOL 10 MG/ML
INJECTION, EMULSION INTRAVENOUS
Status: DISCONTINUED | OUTPATIENT
Start: 2019-02-26 | End: 2019-02-26 | Stop reason: HOSPADM

## 2019-02-26 RX ORDER — PROPOFOL 10 MG/ML
INJECTION, EMULSION INTRAVENOUS AS NEEDED
Status: DISCONTINUED | OUTPATIENT
Start: 2019-02-26 | End: 2019-02-26 | Stop reason: HOSPADM

## 2019-02-26 RX ORDER — FENTANYL CITRATE 50 UG/ML
50 INJECTION, SOLUTION INTRAMUSCULAR; INTRAVENOUS AS NEEDED
Status: CANCELLED | OUTPATIENT
Start: 2019-02-26

## 2019-02-26 RX ORDER — SODIUM CHLORIDE 9 MG/ML
INJECTION, SOLUTION INTRAVENOUS
Status: DISCONTINUED | OUTPATIENT
Start: 2019-02-26 | End: 2019-02-26 | Stop reason: HOSPADM

## 2019-02-26 RX ORDER — INSULIN LISPRO 100 [IU]/ML
INJECTION, SOLUTION INTRAVENOUS; SUBCUTANEOUS ONCE
Status: CANCELLED | OUTPATIENT
Start: 2019-02-26 | End: 2019-02-26

## 2019-02-26 RX ORDER — LIDOCAINE HYDROCHLORIDE 10 MG/ML
INJECTION, SOLUTION EPIDURAL; INFILTRATION; INTRACAUDAL; PERINEURAL AS NEEDED
Status: DISCONTINUED | OUTPATIENT
Start: 2019-02-26 | End: 2019-02-26 | Stop reason: HOSPADM

## 2019-02-26 RX ORDER — MIDAZOLAM HYDROCHLORIDE 1 MG/ML
INJECTION, SOLUTION INTRAMUSCULAR; INTRAVENOUS AS NEEDED
Status: DISCONTINUED | OUTPATIENT
Start: 2019-02-26 | End: 2019-02-26 | Stop reason: HOSPADM

## 2019-02-26 RX ADMIN — PROPOFOL 140 MCG/KG/MIN: 10 INJECTION, EMULSION INTRAVENOUS at 10:44

## 2019-02-26 RX ADMIN — ONDANSETRON 4 MG: 2 INJECTION INTRAMUSCULAR; INTRAVENOUS at 10:40

## 2019-02-26 RX ADMIN — PROPOFOL 50 MG: 10 INJECTION, EMULSION INTRAVENOUS at 10:40

## 2019-02-26 RX ADMIN — PROPOFOL 30 MG: 10 INJECTION, EMULSION INTRAVENOUS at 11:10

## 2019-02-26 RX ADMIN — MIDAZOLAM HYDROCHLORIDE 2 MG: 1 INJECTION, SOLUTION INTRAMUSCULAR; INTRAVENOUS at 10:38

## 2019-02-26 RX ADMIN — SODIUM CHLORIDE 600 MG: 900 INJECTION, SOLUTION INTRAVENOUS at 10:39

## 2019-02-26 RX ADMIN — LIDOCAINE HYDROCHLORIDE 100 MG: 20 INJECTION, SOLUTION EPIDURAL; INFILTRATION; INTRACAUDAL; PERINEURAL at 10:40

## 2019-02-26 RX ADMIN — FENTANYL CITRATE 25 MCG: 50 INJECTION, SOLUTION INTRAMUSCULAR; INTRAVENOUS at 11:10

## 2019-02-26 RX ADMIN — SODIUM CHLORIDE: 9 INJECTION, SOLUTION INTRAVENOUS at 10:37

## 2019-02-26 RX ADMIN — FENTANYL CITRATE 25 MCG: 50 INJECTION, SOLUTION INTRAMUSCULAR; INTRAVENOUS at 11:09

## 2019-02-26 RX ADMIN — FENTANYL CITRATE 50 MCG: 50 INJECTION, SOLUTION INTRAMUSCULAR; INTRAVENOUS at 10:38

## 2019-02-26 RX ADMIN — GLYCOPYRROLATE 0.2 MG: 0.2 INJECTION INTRAMUSCULAR; INTRAVENOUS at 10:39

## 2019-02-26 NOTE — Clinical Note
A Bovie was used. Blend setting: pure. Mode: monopolar. Coagulation Settin. Cut Settin. Site (pad location): anterior thigh. Laterality: right.

## 2019-02-26 NOTE — Clinical Note
TRANSFER - IN REPORT:  
 
Verbal report received from: Lydia Parks RN. Report consisted of patient's Situation, Background, Assessment and  
Recommendations(SBAR). Opportunity for questions and clarification was provided. Assessment completed upon patient's arrival to unit and care assumed. Patient transported with a Cardiac Cath Tech / Patient Care Tech.

## 2019-02-26 NOTE — ANESTHESIA PREPROCEDURE EVALUATION
Anesthetic History Increased risk of difficult airway Review of Systems / Medical History Patient summary reviewed and pertinent labs reviewed Pulmonary Sleep apnea: No treatment Neuro/Psych CVA Psychiatric history Comments: Neuropathy Leg weakness Cardiovascular Hypertension Valvular problems/murmurs: tricuspid insufficiency CHF: NYHA Classification II, dyspnea on exertion Dysrhythmias Pacemaker, CAD and hyperlipidemia Exercise tolerance: <4 METS Comments: NICM 
EF 55% Mild TI  
GI/Hepatic/Renal 
  
 
 
Renal disease: CRI Endo/Other Diabetes: type 2, using insulin Obesity and anemia Other Findings Physical Exam 
 
Airway Mallampati: III 
TM Distance: > 6 cm Neck ROM: normal range of motion Mouth opening: Normal 
 
 Cardiovascular Regular rate and rhythm,  S1 and S2 normal,  no murmur, click, rub, or gallop Dental 
No notable dental hx Pulmonary Breath sounds clear to auscultation Abdominal 
GI exam deferred Other Findings Anesthetic Plan ASA: 4 Anesthesia type: MAC and general - backup Induction: Intravenous Anesthetic plan and risks discussed with: Patient

## 2019-02-26 NOTE — ANESTHESIA POSTPROCEDURE EVALUATION
Procedure(s): REMOVE & REPLACE PPM GEN DUAL LEAD. Anesthesia Post Evaluation Multimodal analgesia: multimodal analgesia used between 6 hours prior to anesthesia start to PACU discharge Patient location during evaluation: bedside Patient participation: complete - patient participated Level of consciousness: awake Pain score: 2 Pain management: adequate Airway patency: patent Anesthetic complications: no 
Cardiovascular status: stable Respiratory status: acceptable Hydration status: acceptable Post anesthesia nausea and vomiting:  controlled Visit Vitals BP (!) 166/93 Pulse 98 Resp 17 Ht 5' 7.5\" (1.715 m) Wt 107 kg (236 lb) SpO2 94% Breastfeeding? No  
BMI 36.42 kg/m²

## 2019-02-26 NOTE — DISCHARGE INSTRUCTIONS
Disposition:  Will need follow-up with device/wound check in 7-10 days in my office. Please contact office at [phone] to confirm appointment. Main Office:    [address], [address]    Restrictions:  Keep incision clean and dry for a total of 72 hours after procedure. Remove dressing in 24 hours if not already removed. Please remove the steristrips (small white adhesive strips over wound) after 7 days if they have not already fallen off. No hot tubs or pools for 2 weeks. OK to shower with \"pat\" dry incision after 72 hours. DISCHARGE SUMMARY from Nurse    PATIENT INSTRUCTIONS:    After general anesthesia or intravenous sedation, for 24 hours or while taking prescription Narcotics:  · Limit your activities  · Do not drive and operate hazardous machinery  · Do not make important personal or business decisions  · Do  not drink alcoholic beverages  · If you have not urinated within 8 hours after discharge, please contact your surgeon on call. Report the following to your surgeon:  · Excessive pain, swelling, redness or odor of or around the surgical area  · Temperature over 100.5  · Nausea and vomiting lasting longer than 4 hours or if unable to take medications  · Any signs of decreased circulation or nerve impairment to extremity: change in color, persistent  numbness, tingling, coldness or increase pain  · Any questions    What to do at Home:    *  Please give a list of your current medications to your Primary Care Provider. *  Please update this list whenever your medications are discontinued, doses are      changed, or new medications (including over-the-counter products) are added. *  Please carry medication information at all times in case of emergency situations.     These are general instructions for a healthy lifestyle:    No smoking/ No tobacco products/ Avoid exposure to second hand smoke  Surgeon General's Warning:  Quitting smoking now greatly reduces serious risk to your health. Obesity, smoking, and sedentary lifestyle greatly increases your risk for illness    A healthy diet, regular physical exercise & weight monitoring are important for maintaining a healthy lifestyle    You may be retaining fluid if you have a history of heart failure or if you experience any of the following symptoms:  Weight gain of 3 pounds or more overnight or 5 pounds in a week, increased swelling in our hands or feet or shortness of breath while lying flat in bed. Please call your doctor as soon as you notice any of these symptoms; do not wait until your next office visit. Recognize signs and symptoms of STROKE:    F-face looks uneven    A-arms unable to move or move unevenly    S-speech slurred or non-existent    T-time-call 911 as soon as signs and symptoms begin-DO NOT go       Back to bed or wait to see if you get better-TIME IS BRAIN. Warning Signs of HEART ATTACK     Call 911 if you have these symptoms:   Chest discomfort. Most heart attacks involve discomfort in the center of the chest that lasts more than a few minutes, or that goes away and comes back. It can feel like uncomfortable pressure, squeezing, fullness, or pain.  Discomfort in other areas of the upper body. Symptoms can include pain or discomfort in one or both arms, the back, neck, jaw, or stomach.  Shortness of breath with or without chest discomfort.  Other signs may include breaking out in a cold sweat, nausea, or lightheadedness. Don't wait more than five minutes to call 911 - MINUTES MATTER! Fast action can save your life. Calling 911 is almost always the fastest way to get lifesaving treatment. Emergency Medical Services staff can begin treatment when they arrive -- up to an hour sooner than if someone gets to the hospital by car. The discharge information has been reviewed with the patient and caregiver. The patient and caregiver verbalized understanding.   Discharge medications reviewed with the patient and caregiver and appropriate educational materials and side effects teaching were provided.   ___________________________________________________________________________________________________________________________________

## 2019-02-26 NOTE — PROGRESS NOTES
Cath holding summary Patient escorted to cath holding from waiting area ambulatory, alert and oriented x 4, voicing no complaints. Changed into gown and placed on monitor. NPO since MN. Lab results, med rec and H&P reviewed on chart. PIV x 1 inserted without difficulty. Family to bedside. 1130 patient arrived to cath holding awake and alert, vital signs stable, left abdomen site clean dry and intact with no hematoma present, no C/O pain will continue to monitor.

## 2019-02-26 NOTE — Clinical Note
TRANSFER - OUT REPORT:  
 
Verbal report given to: Jennifer Ramey. Report consisted of patient's Situation, Background, Assessment and  
Recommendations(SBAR). Opportunity for questions and clarification was provided. Patient transported with a Cardiac Cath Tech / Patient Care Tech. Patient transported to: [address]

## 2019-03-01 ENCOUNTER — TELEPHONE (OUTPATIENT)
Dept: CARDIOLOGY CLINIC | Age: 69
End: 2019-03-01

## 2019-03-01 LAB
BACTERIA SPEC CULT: NORMAL
GRAM STN SPEC: NORMAL
GRAM STN SPEC: NORMAL
SERVICE CMNT-IMP: NORMAL

## 2019-03-01 RX ORDER — CARVEDILOL 6.25 MG/1
6.25 TABLET ORAL 2 TIMES DAILY
Qty: 60 TAB | Refills: 6 | Status: SHIPPED | OUTPATIENT
Start: 2019-03-01 | End: 2019-10-15 | Stop reason: SDUPTHER

## 2019-03-01 RX ORDER — FUROSEMIDE 40 MG/1
TABLET ORAL
Qty: 135 TAB | Refills: 2 | Status: SHIPPED | OUTPATIENT
Start: 2019-03-01 | End: 2019-05-06

## 2019-03-01 NOTE — TELEPHONE ENCOUNTER
Maximiliano Roberson discussed all the patient's laboratory tests with her. She had upper generator change on February 26, 2019 is a still little sore at the incision site. Her laboratory work really all look fine except that her NT proBNP was somewhat more elevated at 3904 than it had been view of small insulin was just 2700. She does feel that she is having a little bit more trouble with transfers from bed to the wheelchair or does feel that she is a little bit more fluid overloaded than she had been in the past I have told her to increase her Lasix from 60 mg a day to 80 mg a day we need to do a BMP and a BNP on her in about 2 weeks. Please get that scheduled.  ES

## 2019-03-01 NOTE — PROGRESS NOTES
Per your last note \" Her edema seems to be somewhat worse than it has been in the past and I am going to check a BNP when we get some of her preprocedure labs including a BMP and will except to adjust her diuretics moving forward.

## 2019-03-07 ENCOUNTER — CLINICAL SUPPORT (OUTPATIENT)
Dept: CARDIOLOGY CLINIC | Age: 69
End: 2019-03-07

## 2019-03-07 DIAGNOSIS — I42.8 NONISCHEMIC CARDIOMYOPATHY (HCC): Primary | Chronic | ICD-10-CM

## 2019-03-07 DIAGNOSIS — Z95.0 BIVENTRICULAR CARDIAC PACEMAKER IN SITU: ICD-10-CM

## 2019-03-08 NOTE — PROGRESS NOTES
I have personally seen and evaluated the device findings. Interrogation reviewed and I agree with assessment.     Misty Madrid

## 2019-03-14 NOTE — ROUTINE PROCESS
Bedside shift report given to [address] (oncoming nurse) Reviewed: · Kardex · SBAR 
· MAR 
· I/Os Denies known Latex allergy or symptoms of Latex sensitivity.  Medications verified, no changes.  Patient would like communication of their results via:      [facility]    Letter  Health Maintenance Due   Topic Date Due   • Depression Screening  12/24/1963   • DTaP/Tdap/Td Vaccine (1 - Tdap) 12/24/1970   • Shingles Vaccine (1 of 2) 12/24/2001   • Abdominal Aortic Aneurysm (AAA) Screening  12/24/2016   • Medicare Wellness 65+  12/24/2016   • Pneumococcal Vaccine 65+ Low/Medium Risk (1 of 2 - PCV13) 12/24/2016   • Influenza Vaccine (1) 09/01/2018       Patient is due for topics as listed above but is not proceeding with Immunization(s) Shingles at this time.   Pt. is here today for f/u b/p and medications.

## 2019-05-06 DIAGNOSIS — I42.8 NONISCHEMIC CARDIOMYOPATHY (HCC): Chronic | ICD-10-CM

## 2019-05-06 DIAGNOSIS — I50.20 BENIGN HYPERTENSIVE HEART DISEASE WITH SYSTOLIC CHF, NYHA CLASS 2 (HCC): Primary | Chronic | ICD-10-CM

## 2019-05-06 DIAGNOSIS — I11.0 BENIGN HYPERTENSIVE HEART DISEASE WITH SYSTOLIC CHF, NYHA CLASS 2 (HCC): Primary | Chronic | ICD-10-CM

## 2019-05-06 RX ORDER — FUROSEMIDE 40 MG/1
80 TABLET ORAL DAILY
Qty: 60 TAB | Refills: 6 | Status: SHIPPED | OUTPATIENT
Start: 2019-05-06 | End: 2019-05-08

## 2019-05-06 NOTE — TELEPHONE ENCOUNTER
Per Dr. Nolen Scriver telephone note dated 3/1/19, pt was instructed to increase Lasix to 80 mg daily. He also advised patient to have BMP/BNP drawn in 2 weeks which was not done. I informed patient of lab order and she states the soonest she will be able to have labs drawn is Friday 5/10/19.

## 2019-05-07 ENCOUNTER — HOSPITAL ENCOUNTER (OUTPATIENT)
Dept: LAB | Age: 69
Discharge: HOME | End: 2019-05-07
Payer: COMMERCIAL

## 2019-05-07 DIAGNOSIS — I50.20 BENIGN HYPERTENSIVE HEART DISEASE WITH SYSTOLIC CHF, NYHA CLASS 2 (HCC): Chronic | ICD-10-CM

## 2019-05-07 DIAGNOSIS — I42.8 NONISCHEMIC CARDIOMYOPATHY (HCC): Chronic | ICD-10-CM

## 2019-05-07 DIAGNOSIS — I11.0 BENIGN HYPERTENSIVE HEART DISEASE WITH SYSTOLIC CHF, NYHA CLASS 2 (HCC): Chronic | ICD-10-CM

## 2019-05-07 LAB
ANION GAP SERPL CALC-SCNC: 4 MMOL/L (ref 3–18)
BNP SERPL-MCNC: 3471 PG/ML (ref 0–900)
BUN SERPL-MCNC: 27 MG/DL (ref 7–18)
BUN/CREAT SERPL: 23 (ref 12–20)
CALCIUM SERPL-MCNC: 9.7 MG/DL (ref 8.5–10.1)
CHLORIDE SERPL-SCNC: 102 MMOL/L (ref 100–108)
CO2 SERPL-SCNC: 33 MMOL/L (ref 21–32)
CREAT SERPL-MCNC: 1.2 MG/DL (ref 0.6–1.3)
GLUCOSE SERPL-MCNC: 106 MG/DL (ref 74–99)
POTASSIUM SERPL-SCNC: 4.3 MMOL/L (ref 3.5–5.5)
SODIUM SERPL-SCNC: 139 MMOL/L (ref 136–145)

## 2019-05-07 PROCEDURE — 80048 BASIC METABOLIC PNL TOTAL CA: CPT

## 2019-05-07 PROCEDURE — 36415 COLL VENOUS BLD VENIPUNCTURE: CPT

## 2019-05-07 PROCEDURE — 83880 ASSAY OF NATRIURETIC PEPTIDE: CPT

## 2019-05-08 ENCOUNTER — OFFICE VISIT (OUTPATIENT)
Dept: CARDIOLOGY CLINIC | Age: 69
End: 2019-05-08

## 2019-05-08 VITALS
HEIGHT: 67 IN | BODY MASS INDEX: 36.96 KG/M2 | DIASTOLIC BLOOD PRESSURE: 82 MMHG | OXYGEN SATURATION: 98 % | SYSTOLIC BLOOD PRESSURE: 138 MMHG | HEART RATE: 80 BPM

## 2019-05-08 DIAGNOSIS — I50.22 CHRONIC SYSTOLIC HEART FAILURE (HCC): Primary | ICD-10-CM

## 2019-05-08 DIAGNOSIS — I42.8 NONISCHEMIC CARDIOMYOPATHY (HCC): ICD-10-CM

## 2019-05-08 DIAGNOSIS — R06.02 SOB (SHORTNESS OF BREATH): ICD-10-CM

## 2019-05-08 DIAGNOSIS — I50.20 BENIGN HYPERTENSIVE HEART DISEASE WITH SYSTOLIC CHF, NYHA CLASS 2 (HCC): ICD-10-CM

## 2019-05-08 DIAGNOSIS — I11.0 BENIGN HYPERTENSIVE HEART DISEASE WITH SYSTOLIC CHF, NYHA CLASS 2 (HCC): ICD-10-CM

## 2019-05-08 DIAGNOSIS — G82.20 PARAPLEGIA (HCC): ICD-10-CM

## 2019-05-08 DIAGNOSIS — I44.7 LEFT BUNDLE BRANCH BLOCK (LBBB) ON ELECTROCARDIOGRAM: ICD-10-CM

## 2019-05-08 DIAGNOSIS — Z95.810 BIVENTRICULAR IMPLANTABLE CARDIOVERTER-DEFIBRILLATOR IN SITU: ICD-10-CM

## 2019-05-08 DIAGNOSIS — R60.9 EDEMA, UNSPECIFIED TYPE: ICD-10-CM

## 2019-05-08 RX ORDER — METOLAZONE 5 MG/1
TABLET ORAL
Qty: 8 TAB | Refills: 3 | Status: SHIPPED | OUTPATIENT
Start: 2019-05-08 | End: 2019-05-20

## 2019-05-08 RX ORDER — FUROSEMIDE 80 MG/1
80 TABLET ORAL DAILY
Qty: 30 TAB | Refills: 3 | Status: SHIPPED | OUTPATIENT
Start: 2019-05-08 | End: 2019-10-15 | Stop reason: SDUPTHER

## 2019-05-08 NOTE — PROGRESS NOTES
HPI: I saw Dayron Sesay in my office in cardiovascular evaluation regarding her dilated nonischemic cardiomyopathy. Ms. Yossi Sesay is a pleasant 61-year-old fair skinned -American female with history of a dilated car myopathy with mild left ventricular dysfunction and an ejection fraction in the 40 to 45% range with widely patent coronary arteries and a cardiac catheterization back in August 1996. Her last echocardiogram completed on May 5, 2017 suggested an ejection fraction of 50%. She did have a biventricular AICD placed but due to trauma and infection in that area she had that removed and now she has a biventricular pacemaker with pulse generator residing in her left upper quadrant placed back in October 2012. Unfortunately she fell at home and traumatized her back developing an infection of her right so as hematoma with development of epidural abscess with neurologic compromise resulting in paraplegia back in April 2017. She had her pacemaker generator changed by Dr. Daniel Walsh back on February 26, 2019. It should be noted that she has an infected right foot and was on antibiotics for for some time before the generator change and she is now off antibiotics and foot is now healing by secondary intention and apparently is doing well according to the patient. Her foot was wrapped extensively at the time of my evaluation and the dressing was not removed. She has noted some increased peripheral edema and a little bit more shortness of breath with movement suggesting some fluid overload. Encounter Diagnoses Name Primary?  Chronic systolic heart failure ([address] Yes  SOB (shortness of breath)  Nonischemic cardiomyopathy ([address]  Left bundle branch block (LBBB) on electrocardiogram   
 Biventricular implantable cardioverter-defibrillator in situ  Benign hypertensive heart disease with systolic CHF, NYHA class 2 ([address]  Paraplegia ([address]  Edema, unspecified type Discussion: This patient appears to be doing only fair and she does seem to be having some problems with fluid overload with recent laboratory tests just done yesterday showing very mild renal insufficiency with BUN/creatinine of 27 and 1.2 but an NT proBNP that was elevated at 3471 suggesting ongoing heart failure. She has been on Lasix 80 mg daily for the last 3 months and this does not seem to have helped much with her failure so I am going to get an echocardiogram to again reassess her LV function and I am in add Zaroxolyn 5 mg on Mondays and Thursdays to her regimen with a repeat BMP and BNP in a week to 10 days and we will try to adjust her diuretics to her blood work since she is unable to stand to weigh herself due to her paraplegia issues. I will plan to see her again within 3 months. PCP: Lea Kim MD  
 
  
Past Medical History:  
Diagnosis Date  Acute paraplegia ([facility] 4/20/2017  Benign hypertensive heart disease with systolic CHF, NYHA class 2 ([facility] 9/5/2012  Biventricular implantable cardioverter-defibrillator in situ 04/28/2005 Upgraded to BiV AICD; gen change 4/2008; pocket revision 10/2009; Abdominal - done on 8/22/2012 by Dr. Marie Gilroy  Cardiac cath 08/15/1996 Patent coronaries. Elev LVEDP. EF 50-55%.  Cardiac echocardiogram 06/23/2015 Ltd study. EF 45-50%. Mild, diffuse hypk. Severe apical hypk. No mass or thrombus was clearly identified, although imaging was suboptimal.    
 Cardiac nuclear imaging test 06/19/2015 Fixed distal apical, distal septal defect more likely due to RV pacing than prior infarct. No ischemia. EF 46%. RWMA c/w RV pacing. Nondiagnostic EKG on pharm stress test.    
 Cardiovascular lower extremity venous duplex 09/04/2012 Acute, non-occlusive DVT in CFV on right. No DVT on left. No superficial thrombosis bilaterally.  Cardiovascular upper extremity venous duplex 08/27/2012 DVT in axillary vein on left. Left subclavian was not visualized.  Chronic anemia 9/5/2012  Chronic systolic heart failure ([address]  Decreased calculated glomerular filtration rate (GFR) 3/30/2017 Calculated GFR equivalent to that of CKD stage 3 = 30-59 ml/min  Diabetic neuropathy associated with type 2 diabetes mellitus ([address] 6/28/2011  Difficult airway for intubation 08/22/2012  
 see anesthesia airway note  Dyslipidemia 6/28/2011  Gout  History of complete heart block 6/28/2011  History of Coumadin therapy Anticoagulation for DVT of the LUE; Discontinued on 3/30/2017  History of deep venous thrombosis 9/5/2012 Left upper extremity  History of pyelonephritis 3/30/2017  Left bundle branch block (LBBB) on electrocardiogram 6/28/2011  Nonischemic cardiomyopathy ([address] 6/28/2011  Obesity (BMI 35.0-39.9 without comorbidity) 3/13/2017  Obstructive sleep apnea on CPAP 2/7/2012  Psoas abscess, right ([address] 4/20/2017  Psoas hematoma, right, secondary to anticoagulant therapy 3/30/2017  Type 2 diabetes mellitus with diabetic neuropathy ([address] 6/28/2011 Past Surgical History:  
Procedure Laterality Date  HX CARPAL TUNNEL RELEASE  4/07 right [address] [place] [address]  HX OTHER SURGICAL  6/11/2012 AICD revision  HX PACEMAKER  4/28/2005 [employer] AICD  [place] REMVL PERM PM PLS GEN W/REPL PLSE GEN 2 LEAD SYS N/A 2/26/2019 REMOVE & REPLACE PPM GEN DUAL LEAD performed by Herminia Jackson MD at [facility] CATH LAB Current Outpatient Medications Medication Sig  
 metOLazone (ZAROXOLYN) 5 mg tablet Take one tablet on Mondays and Thursdays.  furosemide (LASIX) 80 mg tablet Take 1 Tab by mouth daily.  carvedilol (COREG) 6.25 mg tablet Take 1 Tab by mouth two (2) times a day.  predniSONE (DELTASONE) 20 mg tablet TAKE 60MG NIGHT PRIOR TO PROCEDURE AND TAKE 60MG 1 HOUR BEFORE PROCEDURE.  diphenhydrAMINE (BENADRYL) 25 mg capsule TAKE 25MG NIGHT PRIOR TO PROCEDURE, AND TAKE 25MG 1 HOUR PRIOR TO PROCEDURE.  fluticasone (FLONASE) 50 mcg/actuation nasal spray 1 spray each nostril BID  metFORMIN ER (GLUCOPHAGE XR) 500 mg tablet Take 500 mg by mouth daily.  cholecalciferol, VITAMIN D3, (VITAMIN D3) 5,000 unit tab tablet Take  by mouth daily.  gabapentin (NEURONTIN) 300 mg capsule Take 2 Caps by mouth two (2) times a day. Indications: NEUROPATHIC PAIN  
 insulin lispro (HUMALOG) 100 unit/mL injection See sliding scale (Patient taking differently: 4 Units Before breakfast, lunch, and dinner. See sliding scale)  folic acid (FOLVITE) 1 mg tablet Take 1 Tab by mouth daily.  insulin glargine (LANTUS) 100 unit/mL injection 5 Units by SubCUTAneous route nightly. Indications: type 2 diabetes mellitus (Patient taking differently: 10 Units by SubCUTAneous route nightly.) No current facility-administered medications for this visit. Allergies Allergen Reactions  Vancomycin Itching  Ampicillin Itching  Bactrim [Sulfamethoxazole-Trimethoprim] Unknown (comments)  Blueberry Swelling Causes throat swelling  Ciprofloxacin Itching  Codeine Other (comments) Jumpy feeling  Crestor [Rosuvastatin] Itching  Darvocet A500 [Propoxyphene N-Acetaminophen] Itching  Demerol [Meperidine] Itching  Levaquin [Levofloxacin] Itching  Lipitor [Atorvastatin] Myalgia  Magnesium Oxide Itching  
  nausea  Minocin [Minocycline] Unknown (comments)  Pcn [Penicillins] Itching  Pravachol [Pravastatin] Swelling Swelling in mouth. Not allergic per patient, takes at home  Shellfish Derived Unknown (comments)  Sulfa (Sulfonamide Antibiotics) Itching  Ultracet [Tramadol-Acetaminophen] Itching  Vicodin [Hydrocodone-Acetaminophen] Unknown (comments)  Vytorin 10-10 [Ezetimibe-Simvastatin] Myalgia  Percodan [Oxycodone Hcl-Oxycodone-Asa] Itching Social  
Social History Tobacco Use  Smoking status: Never Smoker  Smokeless tobacco: Never Used Substance Use Topics  Alcohol use: Yes Family history: family history includes Cancer in her father. Review of Systems:    
Constitutional: Negative. Respiratory: Negative. Cardiovascular: Positive for orthopnea and leg swelling. Negative for chest pain and palpitations. Gastrointestinal: Negative. Musculoskeletal: Positive for joint pain  and myalgias. Negative for falls Neurological: Negative for dizziness. Physical Exam:  
The patient is an alert, oriented, well developed, well nourished 76 y.o.  female who was in no acute distress at the time of my examination. Visit Vitals /82 Pulse 80 Ht 5' 7\" (1.702 m) SpO2 98% BMI 36.96 kg/m² BP Readings from Last 3 Encounters:  
05/08/19 138/82  
03/21/19 142/82  
02/26/19 (!) 166/93 Wt Readings from Last 3 Encounters:  
04/23/19 107 kg (236 lb)  
03/21/19 107 kg (236 lb)  
02/26/19 107 kg (236 lb) HEENT: Conjunctivae pink, sclera clear and white. Neck: Supple without masses or tenderness. There is mild thyromegaly. No clear jugular venous distention. Carotid upstrokes are full bilaterally, without bruits. Cardiovascular: Chest is symmetrical with good excursion. There  keloid over the incision in left upper chest in former pacemaker site. [place] is displaced between the MCL and AAL. No lifts, heaves, or thrills felt on palpation. Normal S1 and S2 with a paradoxical splitting of the S2, with a grade II/VI DEE along the left sternal border, with radiation to the base without diastolic murmur. Lungs: Bibasilar rales Abdomen: Protuberant and soft non tender. It was not adequately evaluated since the patient was in a wheelchair at the time of evaluation.  
Extremities: 1 + edema on the left leg and 1 + edema on the right leg which is also in a fixed boot type device up the posterior portion of her leg. [place] Neurologic exam: was not completed but the patient is a paraplegic. Review of Data: Please refer to past medical history for most recent cardiac testing. Results for orders placed or performed in visit on 02/07/19 AMB POC EKG ROUTINE W/ 12 LEADS, INTER & REP     Status: None Narrative Normal sinus mechanism with atrial sensing and biventricular pacing with a rate of 80. Marv George D.O., F.A.C.C. [facility] [address] [place] [address] [facility] [place] [address] [facility] [address] [address] Tanya [facility] [phone] PLEASE NOTE:  This document has been produced using voice recognition software. Unrecognized errors in transcription may be present.

## 2019-05-15 ENCOUNTER — HOSPITAL ENCOUNTER (OUTPATIENT)
Dept: LAB | Age: 69
Discharge: HOME | End: 2019-05-15
Payer: COMMERCIAL

## 2019-05-15 DIAGNOSIS — I50.22 CHRONIC SYSTOLIC HEART FAILURE (HCC): ICD-10-CM

## 2019-05-15 DIAGNOSIS — R06.02 SOB (SHORTNESS OF BREATH): ICD-10-CM

## 2019-05-15 LAB
ANION GAP SERPL CALC-SCNC: 6 MMOL/L (ref 3–18)
BNP SERPL-MCNC: 1657 PG/ML (ref 0–900)
BUN SERPL-MCNC: 42 MG/DL (ref 7–18)
BUN/CREAT SERPL: 27 (ref 12–20)
CALCIUM SERPL-MCNC: 10 MG/DL (ref 8.5–10.1)
CHLORIDE SERPL-SCNC: 99 MMOL/L (ref 100–108)
CO2 SERPL-SCNC: 34 MMOL/L (ref 21–32)
CREAT SERPL-MCNC: 1.58 MG/DL (ref 0.6–1.3)
GLUCOSE SERPL-MCNC: 102 MG/DL (ref 74–99)
POTASSIUM SERPL-SCNC: 3.3 MMOL/L (ref 3.5–5.5)
SODIUM SERPL-SCNC: 139 MMOL/L (ref 136–145)

## 2019-05-15 PROCEDURE — 36415 COLL VENOUS BLD VENIPUNCTURE: CPT

## 2019-05-15 PROCEDURE — 80048 BASIC METABOLIC PNL TOTAL CA: CPT

## 2019-05-15 PROCEDURE — 83880 ASSAY OF NATRIURETIC PEPTIDE: CPT

## 2019-05-16 ENCOUNTER — HOSPITAL ENCOUNTER (OUTPATIENT)
Dept: NON INVASIVE DIAGNOSTICS | Age: 69
Discharge: HOME | End: 2019-05-16
Attending: INTERNAL MEDICINE
Payer: COMMERCIAL

## 2019-05-16 VITALS
BODY MASS INDEX: 37.04 KG/M2 | DIASTOLIC BLOOD PRESSURE: 82 MMHG | HEIGHT: 67 IN | SYSTOLIC BLOOD PRESSURE: 138 MMHG | WEIGHT: 236 LBS

## 2019-05-16 DIAGNOSIS — I50.22 CHRONIC SYSTOLIC HEART FAILURE (HCC): ICD-10-CM

## 2019-05-16 DIAGNOSIS — R06.02 SOB (SHORTNESS OF BREATH): ICD-10-CM

## 2019-05-16 LAB
ECHO AO ROOT DIAM: 3.26 CM
ECHO LA AREA 4C: 17.2 CM2
ECHO LA VOL 2C: 83.4 ML (ref 22–52)
ECHO LA VOL 4C: 40.93 ML (ref 22–52)
ECHO LA VOL BP: 64.33 ML (ref 22–52)
ECHO LA VOL/BSA BIPLANE: 29.65 ML/M2 (ref 16–28)
ECHO LA VOLUME INDEX A2C: 38.44 ML/M2 (ref 16–28)
ECHO LA VOLUME INDEX A4C: 18.87 ML/M2 (ref 16–28)
ECHO LV INTERNAL DIMENSION DIASTOLIC: 6.27 CM (ref 3.9–5.3)
ECHO LV INTERNAL DIMENSION SYSTOLIC: 5.56 CM
ECHO LV IVSD: 1.01 CM (ref 0.6–0.9)
ECHO LV MASS 2D: 315.8 G (ref 67–162)
ECHO LV MASS INDEX 2D: 145.6 G/M2 (ref 43–95)
ECHO LV POSTERIOR WALL DIASTOLIC: 0.98 CM (ref 0.6–0.9)
ECHO LVOT DIAM: 2.1 CM
ECHO LVOT PEAK GRADIENT: 2.3 MMHG
ECHO LVOT PEAK VELOCITY: 75.04 CM/S
ECHO LVOT VTI: 14.47 CM
ECHO MV A VELOCITY: 100.85 CM/S
ECHO MV E DECELERATION TIME (DT): 116.6 MS
ECHO MV E VELOCITY: 73.68 CM/S
ECHO MV E/A RATIO: 0.73
ECHO TV REGURGITANT MAX VELOCITY: 254.35 CM/S
ECHO TV REGURGITANT PEAK GRADIENT: 25.9 MMHG

## 2019-05-16 PROCEDURE — C8929 TTE W OR WO FOL WCON,DOPPLER: HCPCS

## 2019-05-16 PROCEDURE — 74011250636 HC RX REV CODE- 250/636: Performed by: INTERNAL MEDICINE

## 2019-05-16 RX ADMIN — PERFLUTREN 2 ML: 6.52 INJECTION, SUSPENSION INTRAVENOUS at 13:54

## 2019-05-17 ENCOUNTER — TELEPHONE (OUTPATIENT)
Dept: CARDIOLOGY CLINIC | Age: 69
End: 2019-05-17

## 2019-05-17 DIAGNOSIS — R06.02 SOB (SHORTNESS OF BREATH): ICD-10-CM

## 2019-05-17 DIAGNOSIS — I50.22 CHRONIC SYSTOLIC HEART FAILURE (HCC): Primary | ICD-10-CM

## 2019-05-17 RX ORDER — SPIRONOLACTONE 25 MG/1
25 TABLET ORAL DAILY
Qty: 30 TAB | Refills: 3 | Status: SHIPPED | OUTPATIENT
Start: 2019-05-17 | End: 2019-07-15

## 2019-05-18 ENCOUNTER — TELEPHONE (OUTPATIENT)
Dept: CARDIOLOGY CLINIC | Age: 69
End: 2019-05-18

## 2019-05-18 NOTE — TELEPHONE ENCOUNTER
I called and talked to the patient with regard to her echo. Her echo shows decreased LV function with EF 26-30% and her renal function worsening on diuretics. We started Aldactone 25 mg and would start Entresto 47/53 BID since good blood pressure and would decrease Lasix to 40 mg daily as well as stop Zaroxolyn. Would repeat labs in week or so and will need to see within a month.  Jabari Cardona or me) to further adjust. ES

## 2019-05-18 NOTE — TELEPHONE ENCOUNTER
----- Message from Emily Abraham RN sent at 5/17/2019  3:44 PM EDT ----- Per your last office note: This patient appears to be doing only fair and she does seem to be having some problems with fluid overload with recent laboratory tests just done yesterday showing very mild renal insufficiency with BUN/creatinine of 27 and 1.2 but an NT proBNP that was elevated at 3471 suggesting ongoing heart failure. She has been on Lasix 80 mg daily for the last 3 months and this does not seem to have helped much with her failure so I am going to get an echocardiogram to again reassess her LV function and I am in add Zaroxolyn 5 mg on Mondays and Thursdays to her regimen with a repeat BMP and BNP in a week to 10 days and we will try to adjust her diuretics to her blood work since she is unable to stand to weigh herself due to her paraplegia issues. I will plan to see her again within 3 months.

## 2019-05-20 NOTE — TELEPHONE ENCOUNTER
Per Dr. Jason Simon, order placed and sent to pharmacy for Entresto 49/51 BID, zaroxolyn discontinued. Patient informed and verbalized understanding. Will repeat labs in 7-10 days.

## 2019-05-24 ENCOUNTER — HOSPITAL ENCOUNTER (OUTPATIENT)
Dept: LAB | Age: 69
Discharge: HOME | End: 2019-05-24
Payer: COMMERCIAL

## 2019-05-24 DIAGNOSIS — I50.22 CHRONIC SYSTOLIC HEART FAILURE (HCC): ICD-10-CM

## 2019-05-24 DIAGNOSIS — R06.02 SOB (SHORTNESS OF BREATH): ICD-10-CM

## 2019-05-24 LAB
ANION GAP SERPL CALC-SCNC: 5 MMOL/L (ref 3–18)
BNP SERPL-MCNC: 2479 PG/ML (ref 0–900)
BUN SERPL-MCNC: 39 MG/DL (ref 7–18)
BUN/CREAT SERPL: 27 (ref 12–20)
CALCIUM SERPL-MCNC: 9.8 MG/DL (ref 8.5–10.1)
CHLORIDE SERPL-SCNC: 103 MMOL/L (ref 100–108)
CO2 SERPL-SCNC: 32 MMOL/L (ref 21–32)
CREAT SERPL-MCNC: 1.46 MG/DL (ref 0.6–1.3)
GLUCOSE SERPL-MCNC: 129 MG/DL (ref 74–99)
POTASSIUM SERPL-SCNC: 4.1 MMOL/L (ref 3.5–5.5)
SODIUM SERPL-SCNC: 140 MMOL/L (ref 136–145)

## 2019-05-24 PROCEDURE — 36415 COLL VENOUS BLD VENIPUNCTURE: CPT

## 2019-05-24 PROCEDURE — 80048 BASIC METABOLIC PNL TOTAL CA: CPT

## 2019-05-24 PROCEDURE — 83880 ASSAY OF NATRIURETIC PEPTIDE: CPT

## 2019-05-30 NOTE — PROGRESS NOTES
Per your last telephone encounter: This patient appears to be doing only fair and she does seem to be having some problems with fluid overload with recent laboratory tests just done yesterday showing very mild renal insufficiency with BUN/creatinine of 27 and 1.2 but an NT proBNP that was elevated at 3471 suggesting ongoing heart failure. She has been on Lasix 80 mg daily for the last 3 months and this does not seem to have helped much with her failure so I am going to get an echocardiogram to again reassess her LV function and I am in add Zaroxolyn 5 mg on Mondays and Thursdays to her regimen with a repeat BMP and BNP in a week to 10 days and we will try to adjust her diuretics to her blood work since she is unable to stand to weigh herself due to her paraplegia issues. I will plan to see her again within 3 months.

## 2019-06-10 ENCOUNTER — TELEPHONE (OUTPATIENT)
Dept: CARDIOLOGY CLINIC | Age: 69
End: 2019-06-10

## 2019-06-10 DIAGNOSIS — R06.02 SOB (SHORTNESS OF BREATH): Primary | ICD-10-CM

## 2019-06-10 DIAGNOSIS — I50.22 CHRONIC SYSTOLIC HEART FAILURE (HCC): ICD-10-CM

## 2019-06-10 DIAGNOSIS — Z86.79 HISTORY OF COMPLETE HEART BLOCK: ICD-10-CM

## 2019-06-10 NOTE — TELEPHONE ENCOUNTER
----- Message from Idania Rawls RN sent at 5/30/2019  3:32 PM EDT ----- Per your last telephone encounter: This patient appears to be doing only fair and she does seem to be having some problems with fluid overload with recent laboratory tests just done yesterday showing very mild renal insufficiency with BUN/creatinine of 27 and 1.2 but an NT proBNP that was elevated at 3471 suggesting ongoing heart failure. She has been on Lasix 80 mg daily for the last 3 months and this does not seem to have helped much with her failure so I am going to get an echocardiogram to again reassess her LV function and I am in add Zaroxolyn 5 mg on Mondays and Thursdays to her regimen with a repeat BMP and BNP in a week to 10 days and we will try to adjust her diuretics to her blood work since she is unable to stand to weigh herself due to her paraplegia issues. I will plan to see her again within 3 months.

## 2019-06-10 NOTE — DIABETES MGMT
General Surgery History and Physical    Patient's Name/Date of Birth: Zandra Moreno / 1968    Date: 6/10/2019     Surgeon: Abdelrahman Vargas M.D.    PCP: Glory Cooks, DO     Chief Complaint: Screening for colon cancer, needs colonoscopy    HPI:   Zandra Moreno is a 46 y.o. female who presents for evaluation of screening colonoscopy. No history of hemorrhoids, denies history of rectal bleeding, constipation, abdominal pain, abdominal operations. Admits dark or black stools. Admits reflux, heart burn. Admits history of colon cancer in their family, father age 68. They are a nonsmoker. Taking omeprazole and pecid and carafate. States reflux better but still dailty symptoms on medications. No Known Allergies    Prior to Admission medications    Medication Sig Start Date End Date Taking? Authorizing Provider   hydrocortisone 2.5 % cream Apply topically 2 times daily. 4/30/19  Yes Charlene P Catterlin, DO   bumetanide (BUMEX) 1 MG tablet Take 1 tablet by mouth every other day 4/15/19  Yes Charlene P Catterlin, DO   Dulaglutide (TRULICITY) 1.5 BV/8.8CX SOPN Inject 1.5 mLs into the skin once a week 4/15/19  Yes Charlene P Catterlin, DO   etodolac (LODINE) 300 MG capsule TK 1 C PO D 4/15/19  Yes Charlene P Catterlin, DO   famotidine (PEPCID) 20 MG tablet Take 1 tablet by mouth 2 times daily 4/15/19  Yes Charlene P Catterlin, DO   insulin glargine (BASAGLAR KWIKPEN) 100 UNIT/ML injection pen Inject 44 Units into the skin nightly 4/15/19  Yes Charlene P Catterlin, DO   insulin lispro (ADMELOG SOLOSTAR) 100 UNIT/ML pen As directed per sliding scale up to 13 units 4 times per day.  4/15/19  Yes Charlene P Catterlin, DO   Metoprolol Succinate 25 MG CS24 Take 0.5 tablets by mouth daily 4/15/19  Yes Charlene P Catterlin, DO   omeprazole (PRILOSEC) 40 MG delayed release capsule TK 1 C PO D 4/15/19  Yes Charlene P Catterlin, DO   ondansetron (ZOFRAN ODT) 4 MG disintegrating tablet Take 1 tablet by mouth every 8 hours as needed for Glycemic Control Plan of Care 
 
T2DM with last A1c level of 8.7% (10/18/2018). See separate notes, 10/18/2018, for assessment of home diabetes management and education. Home diabetes meds: Lantus insulin 20 units daily at bedtime and meal time lispro insulin 4 units TID AC. POC BG range on 10/18/2018: 167-215 mg/dL. POC BG report on 10/19/2018 at time of review: 121, 137, 130 mg/dL. Patient back from OR this afternoon. Patient is on 5 units lantus insulin QHS and very resistant dose of correctional lispro insulin ACHS. Recommendation(s): 
1.) Continue monitoring and consider increasing basal lantus insulin dose from 5 to 10 units daily at bedtime if BG is above target range. Assessment: 
Patient is 76year old with past medical history of type 2 diabetes mellitus with neuropathy, acute paraplegia, chronic systolic CHF, biventricular implantable cardioverter defibrillator, DVT left upper extremity, chronic anemia, dyslipidemia, obstructive sleep apnea, psoas abscess right, and pyelonephritis - was admitted on 10/17/2018 with report of right foot infection, foul smell drainage. Noted: 
Right heel ulcer. Status post debridement right heel/application of wound vac on 10/19/2018. T2DM with pending A1c result. Most recent blood glucose values: 
 
Results for Mendez Dodd (MRN [id number]) as of 10/18/2018 12:17 Ref. Range 10/17/2018 17:19 10/17/2018 21:44 GLUCOSE,FAST - POC Latest Ref Range: 70 - 110 mg/dL 162 (H) 226 (H) Results for Mendez Dodd (MRN [id number]) as of 10/18/2018 12:17 Ref. Range 10/18/2018 08:09 10/18/2018 11:52 GLUCOSE,FAST - POC Latest Ref Range: 70 - 110 mg/dL 175 (H) 215 (H) Current A1C: Last A1c of 8.7% (10/18/2018) which is equivalent to estimated average blood glucose of 203 mg/dL during the past 2-3 months. Current hospital diabetes medications: 
Basal lantus insulin 5 units daily at bedtime. Correctional lispro insulin ACHS. Very resistant dose. Nausea or Vomiting 4/15/19  Yes Charlene P Catterlin, DO   nitroGLYCERIN (NITROSTAT) 0.4 MG SL tablet Place 1 tablet under the tongue every 5 minutes as needed for Chest pain 4/15/19  Yes Charlene P Catterlin, DO   rosuvastatin (CRESTOR) 10 MG tablet Take 0.5 tablets by mouth daily 4/15/19  Yes Charlene P Catterlin, DO   sertraline (ZOLOFT) 100 MG tablet Take 1 tablet by mouth daily as needed (depression) 4/15/19  Yes Charlene P Catterlin, DO   sucralfate (CARAFATE) 1 GM/10ML suspension Take 10 mLs by mouth 4 times daily 4/15/19  Yes Charlene P Catterlin, DO   metFORMIN (GLUCOPHAGE) 500 MG tablet Take 2 tablets by mouth 2 times daily (with meals) 4/1/19  Yes Charlene P Catterlin, DO   Wheat Dextrin (BENEFIBER) POWD Take 4 g by mouth 3 times daily (with meals) 2/13/19  Yes Brady Paulino MD   Insulin Syringe-Needle U-100 ([employer] INSULIN SYR 1CC/30G) 30G X 5/16\" 1 ML MISC 1 each by Does not apply route 4 times daily 1/15/19  Yes Jann Goldsmithtertess, DO   Insulin Pen Needle 32G X 4 MM MISC 1 each by Does not apply route daily 11/26/18  Yes Charlene P Catterlin, DO   blood glucose monitor strips Test once daily & as needed for symptoms of irregular blood glucose. 11/19/18  Yes Charlene P Catterlin, DO   lidocaine-prilocaine (EMLA) 2.5-2.5 % cream Apply topically as needed. 11/17/18  Yes LAKESHA Lim - CNP   Lancets MISC 1 each by Does not apply route daily 11/5/18  Yes Charlene P Catterlin, DO   glucose monitoring kit (FREESTYLE) monitoring kit 1 kit by Does not apply route daily 10/29/18  Yes Charlene P Catterlin, DO   Blood Pressure KIT 1 kit by Does not apply route daily 10/10/18  Yes Charlene P Catterlin, DO   Misc. Devices (QUAD CANE) MISC 1 Quad cane 10/10/18  Yes Charlene Velez, DO   Multiple Vitamins-Minerals (THERAPEUTIC MULTIVITAMIN-MINERALS) tablet Take 1 tablet by mouth daily   Yes Historical Provider, MD   B Complex Vitamins (B COMPLEX 1 PO) Take 1 tablet by mouth daily.      Yes Historical Provider, MD       Past Total daily dose insulin requirement previous day: 10/18/2018 Lantus: 5 units (HS dose 10/18/2018 was given after midnight on 10/19/2018). Lispro: 12 units Home diabetes medications: Patient reported on 10/18/2018: 
Lantus insulin 20 units daily at bedtime. Lispro insulin 4 units 3x daily before meals. Diet: Diabetic consistent carb regular. Goals:  Blood glucose will be within target range of  mg/dL by 10/22/2018. Education:  _X__  Refer to Diabetes Education Record: 10/18/2018 
           ___  Education not indicated at this time Tanya Childers RN [facility] Pager: [phone]

## 2019-06-10 NOTE — TELEPHONE ENCOUNTER
I called and talked to the patient about her edema. Her edema is getting worse and I have asked her to increase her Lasix which she has decreased to 40 mg a day when we initiated the Entresto to 60 mg daily and will need to repeat a BMP and BNP on her in 2 weeks. She will need to see Kenny Guerra or Elaina Harrell at that time to see how she is doing and to decide after seeing the BMP and BNP how much diuretic we are going to use moving forward. I also have to see whether or not she is still taking Zaroxolyn which I forgot to ask her about.  ES

## 2019-06-11 NOTE — TELEPHONE ENCOUNTER
Patient states she is not taking Zaroxolyn, she's taking Lasix 60 mg which was increased at last office visit and  Aldactone 25 mg. Will have BMP and BNP done in 2 weeks and can't follow up with Deondre Andre until July 15th.

## 2019-06-12 ENCOUNTER — OFFICE VISIT (OUTPATIENT)
Dept: CARDIOLOGY CLINIC | Age: 69
End: 2019-06-12

## 2019-06-12 DIAGNOSIS — I42.8 NONISCHEMIC CARDIOMYOPATHY (HCC): Primary | Chronic | ICD-10-CM

## 2019-06-12 DIAGNOSIS — Z95.0 CARDIAC PACEMAKER IN SITU: ICD-10-CM

## 2019-06-26 ENCOUNTER — HOSPITAL ENCOUNTER (OUTPATIENT)
Dept: LAB | Age: 69
Discharge: HOME | End: 2019-06-26
Payer: COMMERCIAL

## 2019-06-26 DIAGNOSIS — I50.22 CHRONIC SYSTOLIC HEART FAILURE (HCC): ICD-10-CM

## 2019-06-26 DIAGNOSIS — R06.02 SOB (SHORTNESS OF BREATH): ICD-10-CM

## 2019-06-26 DIAGNOSIS — Z86.79 HISTORY OF COMPLETE HEART BLOCK: ICD-10-CM

## 2019-06-26 LAB
ANION GAP SERPL CALC-SCNC: 5 MMOL/L (ref 3–18)
BNP SERPL-MCNC: 2790 PG/ML (ref 0–900)
BUN SERPL-MCNC: 37 MG/DL (ref 7–18)
BUN/CREAT SERPL: 24 (ref 12–20)
CALCIUM SERPL-MCNC: 9.4 MG/DL (ref 8.5–10.1)
CHLORIDE SERPL-SCNC: 106 MMOL/L (ref 100–108)
CO2 SERPL-SCNC: 30 MMOL/L (ref 21–32)
CREAT SERPL-MCNC: 1.53 MG/DL (ref 0.6–1.3)
GLUCOSE SERPL-MCNC: 195 MG/DL (ref 74–99)
POTASSIUM SERPL-SCNC: 4.5 MMOL/L (ref 3.5–5.5)
SODIUM SERPL-SCNC: 141 MMOL/L (ref 136–145)

## 2019-06-26 PROCEDURE — 80048 BASIC METABOLIC PNL TOTAL CA: CPT

## 2019-06-26 PROCEDURE — 36415 COLL VENOUS BLD VENIPUNCTURE: CPT

## 2019-06-26 PROCEDURE — 83880 ASSAY OF NATRIURETIC PEPTIDE: CPT

## 2019-06-27 NOTE — PROGRESS NOTES
Per your last telephone encounter: \"This patient appears to be doing only fair and she does seem to be having some problems with fluid overload with recent laboratory tests just done yesterday showing very mild renal insufficiency with BUN/creatinine of 27 and 1.2 but an NT proBNP that was elevated at 3471 suggesting ongoing heart failure. She has been on Lasix 80 mg daily for the last 3 months and this does not seem to have helped much with her failure so I am going to get an echocardiogram to again reassess her LV function and I am in add Zaroxolyn 5 mg on Mondays and Thursdays to her regimen with a repeat BMP and BNP in a week to 10 days and we will try to adjust her diuretics to her blood work since she is unable to stand to weigh herself due to her paraplegia issues. I will plan to see her again within 3 months. \"

## 2019-07-02 ENCOUNTER — TELEPHONE (OUTPATIENT)
Dept: CARDIOLOGY CLINIC | Age: 69
End: 2019-07-02

## 2019-07-02 NOTE — TELEPHONE ENCOUNTER
I called and placed a message on the patient's answering machine to call the office for the results. Her renal function is mildly to moderately depressed with stage 3 chronic kidney disease which appears stable. She still has some failure by BNP. Please see exactly how much diuretic she is taking and how she feels.  ES

## 2019-07-02 NOTE — TELEPHONE ENCOUNTER
----- Message from Joretta Gosselin, RN sent at 6/27/2019  8:56 AM EDT ----- Per your last telephone encounter: \"This patient appears to be doing only fair and she does seem to be having some problems with fluid overload with recent laboratory tests just done yesterday showing very mild renal insufficiency with BUN/creatinine of 27 and 1.2 but an NT proBNP that was elevated at 3471 suggesting ongoing heart failure. She has been on Lasix 80 mg daily for the last 3 months and this does not seem to have helped much with her failure so I am going to get an echocardiogram to again reassess her LV function and I am in add Zaroxolyn 5 mg on Mondays and Thursdays to her regimen with a repeat BMP and BNP in a week to 10 days and we will try to adjust her diuretics to her blood work since she is unable to stand to weigh herself due to her paraplegia issues. I will plan to see her again within 3 months. \"

## 2019-07-03 NOTE — TELEPHONE ENCOUNTER
Spoke to patient. She reports she is taking lasix 60mg daily and aldactone 25mg daily. She said she does get SOB on exertion. Some days she is ok some days she is not as good. She is not taking the entresto ( something with the insurance) and was wondering if we could try doing that for her again and see if her insurance will cover it.

## 2019-07-09 NOTE — TELEPHONE ENCOUNTER
I called and talked to the patient about her breathing. Her shortness of breath is a problem intermittently but not daily and she says her swelling if anything is somewhat less. Her kidney function does seem to be deteriorating to some degree and I would like to lower her Lasix which will require us to try to get her back on Entresto. Please see if we can apply to get that drug approved for her again. She has significant left ventricular dysfunction and chronic heart failure so she is clearly a candidate for that medication. However, I did explain to her that if we are able to get it started we will have to follow her kidney function rather closely because she is now developing a little renal dysfunction which may potentially worsen slightly with Entresto.  ES

## 2019-07-10 NOTE — TELEPHONE ENCOUNTER
Order placed. Unable to reach patient by phone. Voicemail left for patient to return call to office. Will notify Dr. Oli Harden if medication is approved so he can lower lasix dose as desired.

## 2019-07-15 ENCOUNTER — OFFICE VISIT (OUTPATIENT)
Dept: CARDIOLOGY CLINIC | Age: 69
End: 2019-07-15

## 2019-07-15 ENCOUNTER — HOSPITAL ENCOUNTER (OUTPATIENT)
Dept: LAB | Age: 69
Discharge: HOME | End: 2019-07-15
Payer: COMMERCIAL

## 2019-07-15 VITALS
BODY MASS INDEX: 36.96 KG/M2 | DIASTOLIC BLOOD PRESSURE: 80 MMHG | SYSTOLIC BLOOD PRESSURE: 138 MMHG | HEART RATE: 77 BPM | HEIGHT: 67 IN | OXYGEN SATURATION: 98 %

## 2019-07-15 DIAGNOSIS — R06.02 SOB (SHORTNESS OF BREATH): ICD-10-CM

## 2019-07-15 DIAGNOSIS — I11.0 HYPERTENSIVE HEART DISEASE WITH HEART FAILURE (HCC): ICD-10-CM

## 2019-07-15 DIAGNOSIS — I42.8 NONISCHEMIC CARDIOMYOPATHY (HCC): Primary | ICD-10-CM

## 2019-07-15 DIAGNOSIS — G82.20 PARAPLEGIA (HCC): ICD-10-CM

## 2019-07-15 DIAGNOSIS — I42.8 NONISCHEMIC CARDIOMYOPATHY (HCC): ICD-10-CM

## 2019-07-15 DIAGNOSIS — Z95.810 BIVENTRICULAR IMPLANTABLE CARDIOVERTER-DEFIBRILLATOR IN SITU: ICD-10-CM

## 2019-07-15 DIAGNOSIS — I50.22 CHRONIC SYSTOLIC HEART FAILURE (HCC): ICD-10-CM

## 2019-07-15 DIAGNOSIS — I44.7 LEFT BUNDLE BRANCH BLOCK (LBBB) ON ELECTROCARDIOGRAM: ICD-10-CM

## 2019-07-15 LAB
ANION GAP SERPL CALC-SCNC: 7 MMOL/L (ref 3–18)
BNP SERPL-MCNC: 2362 PG/ML (ref 0–900)
BUN SERPL-MCNC: 33 MG/DL (ref 7–18)
BUN/CREAT SERPL: 20 (ref 12–20)
CALCIUM SERPL-MCNC: 9.7 MG/DL (ref 8.5–10.1)
CHLORIDE SERPL-SCNC: 104 MMOL/L (ref 100–108)
CO2 SERPL-SCNC: 29 MMOL/L (ref 21–32)
CREAT SERPL-MCNC: 1.68 MG/DL (ref 0.6–1.3)
GLUCOSE SERPL-MCNC: 115 MG/DL (ref 74–99)
POTASSIUM SERPL-SCNC: 4.8 MMOL/L (ref 3.5–5.5)
SODIUM SERPL-SCNC: 140 MMOL/L (ref 136–145)

## 2019-07-15 PROCEDURE — 36415 COLL VENOUS BLD VENIPUNCTURE: CPT

## 2019-07-15 PROCEDURE — 83880 ASSAY OF NATRIURETIC PEPTIDE: CPT

## 2019-07-15 PROCEDURE — 80048 BASIC METABOLIC PNL TOTAL CA: CPT

## 2019-07-15 NOTE — PROGRESS NOTES
I have personally seen and evaluated the device findings. Interrogation reviewed and I agree with assessment.     Katie Rodriguez

## 2019-07-15 NOTE — PROGRESS NOTES
HPI:  I saw Ambreen Blaine Lozada in my office in cardiovascular evaluation regarding her dilated nonischemic cardiomyopathy. Ms. Sujatha Lozada is a pleasant 70-year-old fair skinned -American female with history of a dilated car myopathy with mild left ventricular dysfunction and an ejection fraction in the 40 to 45% range with widely patent coronary arteries and a cardiac catheterization back in August 1996. Her last echocardiogram completed on May 5, 2017 suggested an ejection fraction of 50%. She did have a biventricular AICD placed but due to trauma and infection in that area she had that removed and now she has a biventricular pacemaker with pulse generator residing in her left upper quadrant placed back in October 2012.     Unfortunately she fell at home and traumatized her back developing an infection of her right so as hematoma with development of epidural abscess with neurologic compromise resulting in paraplegia back in April 2017.     She had her pacemaker generator changed by Dr. Rikki Estrella back on February 26, 2019. It should be noted that she has an infected right foot and was on antibiotics for for some time before the generator change and she is now off antibiotics and foot is now healing by secondary intention and apparently is doing well according to the patient. She has had some problems with chronic heart failure for which we have tried to adjust her diuretics and her last echocardiogram which was completed on May 16, 2019 showed an ejection fraction 26 to 30% range and I felt that she would benefit from [place] but we have been unable to get her approved for that medication by her insurance company. She comes in today and still relates that she is short of breath with any significant exertion and still has a fair amount of lower extremity edema. She, of course, is unable to weigh herself because of her paraplegia so managing her heart failure is somewhat challenging.     Encounter Diagnoses Name Primary?  Nonischemic cardiomyopathy (HCC) Yes    Chronic systolic heart failure (HCC)     Biventricular implantable cardioverter-defibrillator in situ     Left bundle branch block (LBBB) on electrocardiogram     Hypertensive heart disease with heart failure (HCC)     Paraplegia (HCC)     SOB (shortness of breath)        Discussion: This lady appears to be doing fair but she is getting short of breath with any significant exertion and continues to have problems with peripheral edema and her most recent laboratory work did demonstrate some elevation of her creatinine, so I would really like to get her diuretic therapy decreased. I did try to get her on Entresto but apparently there was a preauthorization that was not completed so I am going to work on getting that medication started at the 49/51 mg twice a day dosage at which time I will decrease her Lasix from 80mg to 40 mg daily and will plan to have her see my nurse practitioner in a month and myself in about 3 months. Hopefully we can get her on higher dose Entresto and really minimize her diuretics and preserve her renal function somewhat. I am going to get a BMP and a BNP as a baseline now prior to initiating her Entresto. It should be noted that we did check her device today and there was some impedance issues and we had the [employer] rep call him and further adjust her device and hopefully this will diminish on only the impedance issue but some discomfort she is been having down in her left abdomen over the device area. PCP: Goldie Cao MD        Past Medical History:   Diagnosis Date    Acute paraplegia ([facility] [address] 4/20/2017    Benign hypertensive heart disease with systolic CHF, NYHA class 2 ([address] 9/5/2012    Biventricular implantable cardioverter-defibrillator in situ 04/28/2005    Upgraded to BiV AICD; gen change 4/2008; pocket revision 10/2009;  Abdominal - done on 8/22/2012 by Dr. Gerson Kapadia Cardiac cath 08/15/1996 Patent coronaries. Elev LVEDP. EF 50-55%.  Cardiac echocardiogram 06/23/2015    Ltd study. EF 45-50%. Mild, diffuse hypk. Severe apical hypk. No mass or thrombus was clearly identified, although imaging was suboptimal.      Cardiac nuclear imaging test 06/19/2015    Fixed distal apical, distal septal defect more likely due to RV pacing than prior infarct. No ischemia. EF 46%. RWMA c/w RV pacing. Nondiagnostic EKG on pharm stress test.      Cardiovascular lower extremity venous duplex 09/04/2012    Acute, non-occlusive DVT in CFV on right. No DVT on left. No superficial thrombosis bilaterally.  Cardiovascular upper extremity venous duplex 08/27/2012    DVT in axillary vein on left. Left subclavian was not visualized.     Chronic anemia 9/5/2012    Chronic systolic heart failure (HCC)     Decreased calculated glomerular filtration rate (GFR) 3/30/2017    Calculated GFR equivalent to that of CKD stage 3 = 30-59 ml/min    Diabetic neuropathy associated with type 2 diabetes mellitus ([address] 6/28/2011    Difficult airway for intubation 08/22/2012    see anesthesia airway note    Dyslipidemia 6/28/2011    Gout     History of complete heart block 6/28/2011    History of Coumadin therapy     Anticoagulation for DVT of the LUE; Discontinued on 3/30/2017    History of deep venous thrombosis 9/5/2012    Left upper extremity    History of pyelonephritis 3/30/2017    Left bundle branch block (LBBB) on electrocardiogram 6/28/2011    Nonischemic cardiomyopathy ([address] 6/28/2011    Obesity (BMI 35.0-39.9 without comorbidity) 3/13/2017    Obstructive sleep apnea on CPAP 2/7/2012    Psoas abscess, right ([address] 4/20/2017    Psoas hematoma, right, secondary to anticoagulant therapy 3/30/2017    Type 2 diabetes mellitus with diabetic neuropathy ([address] 6/28/2011         Past Surgical History:   Procedure Laterality Date    HX CARPAL TUNNEL RELEASE  4/07    right     HX CHOLECYSTECTOMY  1994    HX HYSTERECTOMY  1973    HX OTHER SURGICAL  6/11/2012    AICD revision    HX PACEMAKER  4/28/2005    [employer] AICD    [place] REMVL PERM PM PLS GEN W/REPL PLSE GEN 2 LEAD SYS N/A 2/26/2019    REMOVE & REPLACE PPM GEN DUAL LEAD performed by Moon Sandhu MD at [facility] CATH LAB     Current Outpatient Medications   Medication Sig    sacubitril-valsartan (ENTRESTO) 49 mg/51 mg tablet Take 1 Tab by mouth two (2) times a day.  nitrofurantoin, macrocrystal-monohydrate, (MACROBID) 100 mg capsule Take 1 Cap by mouth two (2) times a day.  furosemide (LASIX) 80 mg tablet Take 1 Tab by mouth daily. (Patient taking differently: Take 60 mg by mouth daily.)    carvedilol (COREG) 6.25 mg tablet Take 1 Tab by mouth two (2) times a day.  predniSONE (DELTASONE) 20 mg tablet TAKE 60MG NIGHT PRIOR TO PROCEDURE AND TAKE 60MG 1 HOUR BEFORE PROCEDURE.  diphenhydrAMINE (BENADRYL) 25 mg capsule TAKE 25MG NIGHT PRIOR TO PROCEDURE, AND TAKE 25MG 1 HOUR PRIOR TO PROCEDURE.  fluticasone (FLONASE) 50 mcg/actuation nasal spray 1 spray each nostril BID    metFORMIN ER (GLUCOPHAGE XR) 500 mg tablet Take 500 mg by mouth daily.  cholecalciferol, VITAMIN D3, (VITAMIN D3) 5,000 unit tab tablet Take  by mouth daily.  gabapentin (NEURONTIN) 300 mg capsule Take 2 Caps by mouth two (2) times a day. Indications: NEUROPATHIC PAIN    insulin lispro (HUMALOG) 100 unit/mL injection See sliding scale (Patient taking differently: 4 Units Before breakfast, lunch, and dinner. See sliding scale)    folic acid (FOLVITE) 1 mg tablet Take 1 Tab by mouth daily.  insulin glargine (LANTUS) 100 unit/mL injection 5 Units by SubCUTAneous route nightly. Indications: type 2 diabetes mellitus (Patient taking differently: 10 Units by SubCUTAneous route nightly.)     No current facility-administered medications for this visit.           Allergies   Allergen Reactions    Vancomycin Itching    Ampicillin Itching    Bactrim [Sulfamethoxazole-Trimethoprim] Unknown (comments)    Blueberry Swelling     Causes throat swelling    Ciprofloxacin Itching    Codeine Other (comments)     Jumpy feeling    Crestor [Rosuvastatin] Itching    Darvocet A500 [Propoxyphene N-Acetaminophen] Itching    Demerol [Meperidine] Itching    Levaquin [Levofloxacin] Itching    Lipitor [Atorvastatin] Myalgia    Magnesium Oxide Itching     nausea    Minocin [Minocycline] Unknown (comments)    Pcn [Penicillins] Itching    Pravachol [Pravastatin] Swelling     Swelling in mouth. Not allergic per patient, takes at home    Shellfish Derived Unknown (comments)    Sulfa (Sulfonamide Antibiotics) Itching    Ultracet [Tramadol-Acetaminophen] Itching    Vicodin [Hydrocodone-Acetaminophen] Unknown (comments)    Vytorin 10-10 [Ezetimibe-Simvastatin] Myalgia    Percodan [Oxycodone Hcl-Oxycodone-Asa] Itching         Social   Social History     Tobacco Use    Smoking status: Never Smoker    Smokeless tobacco: Never Used   Substance Use Topics    Alcohol use: Yes         Family history: family history includes Cancer in her father. Review of Systems:     Constitutional: Positive for chills. Negative for fever, malaise/fatigue and weight loss. Respiratory: Positive for shortness of breath. Negative for cough, hemoptysis and wheezing. Cardiovascular: Positive for orthopnea and leg swelling. Negative for chest pain. Gastrointestinal: Negative. Musculoskeletal: Positive for joint pain and myalgias. Negative for falls. Neurological: Negative for dizziness. Physical Exam:   The patient is an alert, oriented, well developed, well nourished 76 y.o.  female who was in no acute distress at the time of my examination.   Visit Vitals  /80   Pulse 77   Ht 5' 7\" (1.702 m)   SpO2 98%   BMI 36.96 kg/m²      BP Readings from Last 3 Encounters:   07/15/19 138/80   07/03/19 140/74   05/16/19 138/82        Wt Readings from Last 3 Encounters:   07/03/19 107 kg (236 lb)   05/16/19 107 kg (236 lb)   04/23/19 107 kg (236 lb)       HEENT: Conjunctivae pink, sclera clear and white. Neck: Supple without masses or tenderness. There is mild thyromegaly. No clear jugular venous distention. Carotid upstrokes are full bilaterally, without bruits. Cardiovascular: Chest is symmetrical with good excursion. There  keloid over the incision in left upper chest in former pacemaker site. Winn is displaced between the MCL and AAL. No lifts, heaves, or thrills felt on palpation. Normal S1 and S2 with a paradoxical splitting of the S2, with a grade II/VI DEE along the left sternal border, with radiation to the base without diastolic murmur. Lungs: Bibasilar rales   Abdomen: Protuberant and soft non tender. It was not adequately evaluated since the patient was in a wheelchair at the time of evaluation. Extremities: 1 + edema on the left leg and 1 + edema on the right leg which is also in a fixed boot type device up the posterior portion of her leg. [place] Neurologic exam: was not completed but the patient is a paraplegic. Review of Data: Please refer to past medical history for most recent cardiac testing. Results for orders placed or performed in visit on 02/07/19   AMB POC EKG ROUTINE W/ 12 LEADS, INTER & REP     Status: None    Narrative    Normal sinus mechanism with atrial sensing and biventricular pacing with a rate of 80. Gómez Contreras D.O., F.A.C.C. [facility]  [facility] [place]  [address] [place]    PLEASE NOTE:  This document has been produced using voice recognition software. Unrecognized errors in transcription may be present.

## 2019-07-16 NOTE — PROGRESS NOTES
Per your last note'  I am going to get a BMP and a BNP as a baseline now prior to initiating her Entresto.

## 2019-07-20 NOTE — PROGRESS NOTES
These are labs for initiation of Entresto and show some CHF and mild kidney dysfunction. We just need to repeat in 2-3 weeks. Please let the patient know.   ES

## 2019-07-24 ENCOUNTER — TELEPHONE (OUTPATIENT)
Dept: CARDIOLOGY CLINIC | Age: 69
End: 2019-07-24

## 2019-08-07 PROBLEM — E11.42 DIABETIC POLYNEUROPATHY ASSOCIATED WITH TYPE 2 DIABETES MELLITUS (HCC): Status: ACTIVE | Noted: 2019-03-26

## 2019-08-07 PROBLEM — S91.301A NON-HEALING WOUND OF RIGHT HEEL: Status: ACTIVE | Noted: 2019-03-26

## 2019-08-14 NOTE — PROGRESS NOTES
Tyrone Loza presents today for a one month check-up. She was last seen by Dr. Jason Yoon on 7/15/19. During that visit, she complained of shortness of breath with any significant exertion as well as lower extremity edema. Her renal indices were elevated and Dr. Jason Yoon wanted to decrease her lasix. Her pre-authorization for Mauro Pichardo was not completed at that time of that visit and his plan was to initiate Entresto 49/51 BID and decrease her Lasix to 40mg daily. Her Mauro Pichardo was approved several weeks almost 2 weeks later and she was finally able to get the medication towards the end of July 2019. She has been on Entresto 49/51 twice a day for about 3 weeks. She is also only taking Lasix 40 mg at this time. While reviewing her medications, she states that she was given instructions to discontinue her Spironolactone but she is not sure when she discontinued it. She is a 71year old female with a history of a dilated cardiomyopathy, mild left ventricular dysfunction, and widely patent coronary arteries (cardiac catheterization done in August 1996). She also has a biventricular pacemaker but not an AICD placed in October of 2012 (in abdomen). She previously had a biventricular ICD but it was removed due to infection at the site. She was last seen by Dr. Jason Yoon on March 12, 2018 and her last echocardiogram was done in May 2017 which showed ejection fraction of 50%, no wall motion abnormalities, RVSP of 39 mmHg. The stress test was completed on 6/19/15 showed no reversible perfusion imaging abnormalities concerning for myocardial ischemia. In April 2017, she fell at home, traumatized her back and developed an infected right psoas hematoma with development of an epidural abscess with neurological compromise with resulting paraplegia. She had her pacemaker generator changed by Dr. Lyubov Braun back on February 26, 2019.   She had an echocardiogram done on May 16, 2019 showed an ejection fraction 26 to 30%.    She reports that she has had problems with recurrent UTIs due to her indwelling Krueger catheter. She is being followed by urology and she describes what sounds like the recommendation for a urostomy tube which she is still considering. Cardiac wise, she is feeling okay. She denies any chest pain, palpitations, and shortness of breath at rest.  She denies chest pain, tightness, heaviness, and palpitations. She denies shortness of breath at rest, admits to some dyspnea on exertion, denies orthopnea and denies PND. She denies abdominal bloating. She denies lightheadedness, dizziness, and syncope. She admits to lower extremity edema and denies claudication. Denies nausea, vomiting, diarrhea, fever, chills. She admits to joint and neuropathy pain. She admits to peripheral neuropathy. She has an infection in her right foot which appears to be healing. She states that she has a little bit more swelling in the right leg than in the left. PMH:  Past Medical History:   Diagnosis Date    Acute paraplegia ([facility] [address] 4/20/2017    Benign hypertensive heart disease with systolic CHF, NYHA class 2 ([facility] [address] 9/5/2012    Biventricular implantable cardioverter-defibrillator in situ 04/28/2005    Upgraded to BiV AICD; gen change 4/2008; pocket revision 10/2009; Abdominal - done on 8/22/2012 by Dr. Chan Wood Cardiac cath 08/15/1996    Patent coronaries. Elev LVEDP. EF 50-55%.  Cardiac echocardiogram 06/23/2015    Ltd study. EF 45-50%. Mild, diffuse hypk. Severe apical hypk. No mass or thrombus was clearly identified, although imaging was suboptimal.      Cardiac nuclear imaging test 06/19/2015    Fixed distal apical, distal septal defect more likely due to RV pacing than prior infarct. No ischemia. EF 46%. RWMA c/w RV pacing. Nondiagnostic EKG on pharm stress test.      Cardiovascular lower extremity venous duplex 09/04/2012    Acute, non-occlusive DVT in CFV on right. No DVT on left.   No superficial thrombosis bilaterally.  Cardiovascular upper extremity venous duplex 08/27/2012    DVT in axillary vein on left. Left subclavian was not visualized.  Chronic anemia 9/5/2012    Chronic systolic heart failure (HCC)     Decreased calculated glomerular filtration rate (GFR) 3/30/2017    Calculated GFR equivalent to that of CKD stage 3 = 30-59 ml/min    Diabetic neuropathy associated with type 2 diabetes mellitus ([address] 6/28/2011    Difficult airway for intubation 08/22/2012    see anesthesia airway note    Dyslipidemia 6/28/2011    Gout     History of complete heart block 6/28/2011    History of Coumadin therapy     Anticoagulation for DVT of the LUE; Discontinued on 3/30/2017    History of deep venous thrombosis 9/5/2012    Left upper extremity    History of pyelonephritis 3/30/2017    Left bundle branch block (LBBB) on electrocardiogram 6/28/2011    Nonischemic cardiomyopathy ([address] 6/28/2011    Obesity (BMI 35.0-39.9 without comorbidity) 3/13/2017    Obstructive sleep apnea on CPAP 2/7/2012    Psoas abscess, right ([address] 4/20/2017    Psoas hematoma, right, secondary to anticoagulant therapy 3/30/2017    Type 2 diabetes mellitus with diabetic neuropathy ([address] 6/28/2011       PSH:  Past Surgical History:   Procedure Laterality Date    HX CARPAL TUNNEL RELEASE  4/07    right     HX CHOLECYSTECTOMY  1994    HX HYSTERECTOMY  1973    HX OTHER SURGICAL  6/11/2012    AICD revision    HX PACEMAKER  4/28/2005    [employer] AICD    [place] REMVL PERM PM PLS GEN W/REPL PLSE GEN 2 LEAD SYS N/A 2/26/2019    REMOVE & REPLACE PPM GEN DUAL LEAD performed by Viki Shea MD at [facility] CATH LAB       MEDS:  Current Outpatient Medications   Medication Sig    estradiol (ESTRACE) 0.01 % (0.1 mg/gram) vaginal cream Insert 0.5 g into vagina every Monday, Wednesday, Friday.  sacubitril-valsartan (ENTRESTO) 49 mg/51 mg tablet Take 1 Tab by mouth two (2) times a day.     furosemide (LASIX) 80 mg tablet Take 1 Tab by mouth daily. (Patient taking differently: Take 40 mg by mouth daily.)    carvedilol (COREG) 6.25 mg tablet Take 1 Tab by mouth two (2) times a day.  diphenhydrAMINE (BENADRYL) 25 mg capsule TAKE 25MG NIGHT PRIOR TO PROCEDURE, AND TAKE 25MG 1 HOUR PRIOR TO PROCEDURE.  fluticasone (FLONASE) 50 mcg/actuation nasal spray 1 spray each nostril BID    metFORMIN ER (GLUCOPHAGE XR) 500 mg tablet Take 500 mg by mouth daily.  cholecalciferol, VITAMIN D3, (VITAMIN D3) 5,000 unit tab tablet Take  by mouth daily.  gabapentin (NEURONTIN) 300 mg capsule Take 2 Caps by mouth two (2) times a day. Indications: NEUROPATHIC PAIN    insulin lispro (HUMALOG) 100 unit/mL injection See sliding scale (Patient taking differently: 4 Units Before breakfast, lunch, and dinner. See sliding scale)    folic acid (FOLVITE) 1 mg tablet Take 1 Tab by mouth daily.  insulin glargine (LANTUS) 100 unit/mL injection 5 Units by SubCUTAneous route nightly. Indications: type 2 diabetes mellitus (Patient taking differently: 10 Units by SubCUTAneous route nightly.)     No current facility-administered medications for this visit. Allergies and Sensitivities:  Allergies   Allergen Reactions    Vancomycin Itching    Ampicillin Itching    Bactrim [Sulfamethoxazole-Trimethoprim] Unknown (comments)    Blueberry Swelling     Causes throat swelling    Ciprofloxacin Itching    Codeine Other (comments)     Jumpy feeling    Crestor [Rosuvastatin] Itching    Darvocet A500 [Propoxyphene N-Acetaminophen] Itching    Demerol [Meperidine] Itching    Levaquin [Levofloxacin] Itching    Lipitor [Atorvastatin] Myalgia    Magnesium Oxide Itching     nausea    Minocin [Minocycline] Unknown (comments)    Pcn [Penicillins] Itching    Pravachol [Pravastatin] Swelling     Swelling in mouth.    Not allergic per patient, takes at home    Shellfish Derived Unknown (comments)    Sulfa (Sulfonamide Antibiotics) Itching    Ultracet [Tramadol-Acetaminophen] Itching    Vicodin [Hydrocodone-Acetaminophen] Unknown (comments)    Vytorin 10-10 [Ezetimibe-Simvastatin] Myalgia    Percodan [Oxycodone Hcl-Oxycodone-Asa] Itching       Family History:  Family History   Problem Relation Age of Onset    Cancer Father         Leukemia       Social History:  She  reports that she has never smoked. She has never used smokeless tobacco.  She  reports that she drinks alcohol. Physical:  Visit Vitals  /70   Pulse 73   Ht 5' 7\" (1.702 m)   SpO2 98%   BMI 36.96 kg/m²       She is unable to weigh as she is non-weight bearing and paraplegic    Exam:  Neck:  Supple, no JVD, no carotid bruits  CV:  Normal S1 and  S2, grade II/VI DEE noted along the LSB, no rubs, or gallops noted  Lungs:  Clear to ausculation throughout, no wheezes or rales  Abd:  Soft, non-tender, obese, non-distended with good bowel sounds. No hepatosplenomegaly  Extremities:  Trace to 1+ softly pitting lower extremity edema, right greater than left      Data:  EKG:   Not done today      LABS:  Lab Results   Component Value Date/Time    Sodium 140 07/15/2019 01:23 PM    Potassium 4.8 07/15/2019 01:23 PM    Chloride 104 07/15/2019 01:23 PM    CO2 29 07/15/2019 01:23 PM    Glucose 115 (H) 07/15/2019 01:23 PM    BUN 33 (H) 07/15/2019 01:23 PM    Creatinine 1.68 (H) 07/15/2019 01:23 PM     Lab Results   Component Value Date/Time    Cholesterol, total 127 08/17/2018 02:40 AM    HDL Cholesterol 42 08/17/2018 02:40 AM    LDL, calculated 61.6 08/17/2018 02:40 AM    Triglyceride 117 08/17/2018 02:40 AM    CHOL/HDL Ratio 3.0 08/17/2018 02:40 AM     Lab Results   Component Value Date/Time    ALT (SGPT) 21 02/20/2019 11:57 AM     Impression / Plan:  1. Non-ischemic cardiomyopathy, with Bi-ventricular pacemaker but not ICD (located in abdomen)  2. Hypertension, blood pressure well-controlled  3. Chronic systolic heart failure,  NYHA class 2-3, stable  4.   Diabetes, recommend Hgb A1c less than 7% from cardiac standpoint  5. Hypercholesterolemia, unable to tolerate statins  6. Fatigue  7. Recurrent UTIs, followed by urology      Mrs. Marcella Waller was seen today for a one month follow-up after finally being able to obtain [facility] 49/51. She has been taking this dose for about 3 weeks and appears to be tolerating it well. She states that she is currently taking Lasix 40 mg daily which was decreased during her last visit with Dr. Eunice Cortes. She has not noticed any increase in her lower extremity edema since her Lasix dose was decreased to 40 mg daily. Her blood pressure is well controlled, lungs are clear, and she has trace to 1+ lower extremity edema; right slightly greater than left. Most recent BMP from mid July 2019 showed a BUN of 33 and creatinine of 1.6. These results are prior to her Lasix being decreased to 40 mg as well as prior to being started on the [facility]. She was given a lab slip for BMP  and NT proBNP to be done today. We will await results and call her if any medication changes need to be made. At this time, I asked that she remain on her current dose of Entresto 49/51 twice a day until lab results are received and reviewed. She has an appointment scheduled to see Dr. Eunice Cortes in about 2 weeks and if her renal indices are stable, her dose of Entresto can be increased to 97/103 twice a day at that time. Congestive heart failure teaching reinforced today. Advised to limit sodium intake to no more than 2000mg per day and to also watch fluid intake. Advised to weigh daily every morning and record weights. Instructed to call our office if progressive weight gain is noted over a 2 to 3 day period of time, shortness of breath increases, or if abdominal bloating, nausea, fatigue, or increased lower extremity edema is noted. She will follow-up with Dr. Eunice Cortes as scheduled and PRN.       Felicia Garvey MSN, FNP-BC    Please note:  Portions of this chart were created with Dragon medical speech to text program.  Unrecognized errors may be present.

## 2019-08-19 ENCOUNTER — OFFICE VISIT (OUTPATIENT)
Dept: CARDIOLOGY CLINIC | Age: 69
End: 2019-08-19

## 2019-08-19 ENCOUNTER — HOSPITAL ENCOUNTER (OUTPATIENT)
Dept: LAB | Age: 69
Discharge: HOME | End: 2019-08-19
Payer: COMMERCIAL

## 2019-08-19 VITALS
BODY MASS INDEX: 36.96 KG/M2 | DIASTOLIC BLOOD PRESSURE: 70 MMHG | HEIGHT: 67 IN | OXYGEN SATURATION: 98 % | HEART RATE: 73 BPM | SYSTOLIC BLOOD PRESSURE: 122 MMHG

## 2019-08-19 DIAGNOSIS — I42.8 NONISCHEMIC CARDIOMYOPATHY (HCC): ICD-10-CM

## 2019-08-19 DIAGNOSIS — I50.22 CHRONIC SYSTOLIC HEART FAILURE (HCC): ICD-10-CM

## 2019-08-19 DIAGNOSIS — R60.9 EDEMA, UNSPECIFIED TYPE: ICD-10-CM

## 2019-08-19 DIAGNOSIS — R06.02 SOB (SHORTNESS OF BREATH): ICD-10-CM

## 2019-08-19 DIAGNOSIS — Z95.810 BIVENTRICULAR IMPLANTABLE CARDIOVERTER-DEFIBRILLATOR IN SITU: ICD-10-CM

## 2019-08-19 DIAGNOSIS — I50.22 CHRONIC SYSTOLIC HEART FAILURE (HCC): Primary | ICD-10-CM

## 2019-08-19 LAB
ANION GAP SERPL CALC-SCNC: 6 MMOL/L (ref 3–18)
BNP SERPL-MCNC: 1684 PG/ML (ref 0–900)
BUN SERPL-MCNC: 39 MG/DL (ref 7–18)
BUN/CREAT SERPL: 23 (ref 12–20)
CALCIUM SERPL-MCNC: 9.6 MG/DL (ref 8.5–10.1)
CHLORIDE SERPL-SCNC: 107 MMOL/L (ref 100–111)
CO2 SERPL-SCNC: 30 MMOL/L (ref 21–32)
CREAT SERPL-MCNC: 1.67 MG/DL (ref 0.6–1.3)
GLUCOSE SERPL-MCNC: 106 MG/DL (ref 74–99)
POTASSIUM SERPL-SCNC: 4.8 MMOL/L (ref 3.5–5.5)
SODIUM SERPL-SCNC: 143 MMOL/L (ref 136–145)

## 2019-08-19 PROCEDURE — 80048 BASIC METABOLIC PNL TOTAL CA: CPT

## 2019-08-19 PROCEDURE — 83880 ASSAY OF NATRIURETIC PEPTIDE: CPT

## 2019-08-19 PROCEDURE — 36415 COLL VENOUS BLD VENIPUNCTURE: CPT

## 2019-08-19 NOTE — PATIENT INSTRUCTIONS
BMP and NT pro-BNP to be done today  Will await results and will call you if any medication changes need to be made  Continue present medication regimen  Follow-up with Dr. Ankit Ochoa as scheduled and as needed.

## 2019-09-04 ENCOUNTER — HOSPITAL ENCOUNTER (OUTPATIENT)
Dept: PREADMISSION TESTING | Age: 69
Discharge: HOME | End: 2019-09-04
Payer: COMMERCIAL

## 2019-09-04 DIAGNOSIS — R33.9 RETENTION, URINE: ICD-10-CM

## 2019-09-04 LAB
AMORPH CRY URNS QL MICRO: ABNORMAL
ANION GAP SERPL CALC-SCNC: 4 MMOL/L (ref 3–18)
APPEARANCE UR: ABNORMAL
BACTERIA URNS QL MICRO: ABNORMAL /HPF
BILIRUB UR QL: NEGATIVE
BUN SERPL-MCNC: 34 MG/DL (ref 7–18)
BUN/CREAT SERPL: 20 (ref 12–20)
CALCIUM SERPL-MCNC: 9.4 MG/DL (ref 8.5–10.1)
CHLORIDE SERPL-SCNC: 109 MMOL/L (ref 100–111)
CO2 SERPL-SCNC: 31 MMOL/L (ref 21–32)
COLOR UR: YELLOW
CREAT SERPL-MCNC: 1.66 MG/DL (ref 0.6–1.3)
EPITH CASTS URNS QL MICRO: ABNORMAL /LPF (ref 0–5)
ERYTHROCYTE [DISTWIDTH] IN BLOOD BY AUTOMATED COUNT: 14.9 % (ref 11.6–14.5)
GLUCOSE SERPL-MCNC: 108 MG/DL (ref 74–99)
GLUCOSE UR STRIP.AUTO-MCNC: NEGATIVE MG/DL
HCT VFR BLD AUTO: 35.4 % (ref 35–45)
HGB BLD-MCNC: 12.2 G/DL (ref 12–16)
HGB UR QL STRIP: ABNORMAL
KETONES UR QL STRIP.AUTO: NEGATIVE MG/DL
LEUKOCYTE ESTERASE UR QL STRIP.AUTO: ABNORMAL
MCH RBC QN AUTO: 29.3 PG (ref 24–34)
MCHC RBC AUTO-ENTMCNC: 34.5 G/DL (ref 31–37)
MCV RBC AUTO: 85.1 FL (ref 74–97)
NITRITE UR QL STRIP.AUTO: NEGATIVE
PH UR STRIP: 7.5 [PH] (ref 5–8)
PLATELET # BLD AUTO: 182 K/UL (ref 135–420)
PMV BLD AUTO: 9.7 FL (ref 9.2–11.8)
POTASSIUM SERPL-SCNC: 4.5 MMOL/L (ref 3.5–5.5)
PROT UR STRIP-MCNC: ABNORMAL MG/DL
RBC # BLD AUTO: 4.16 M/UL (ref 4.2–5.3)
RBC #/AREA URNS HPF: ABNORMAL /HPF (ref 0–5)
SODIUM SERPL-SCNC: 144 MMOL/L (ref 136–145)
SP GR UR REFRACTOMETRY: 1.01 (ref 1–1.03)
UROBILINOGEN UR QL STRIP.AUTO: 0.2 EU/DL (ref 0.2–1)
WBC # BLD AUTO: 5.1 K/UL (ref 4.6–13.2)
WBC URNS QL MICRO: ABNORMAL /HPF (ref 0–4)

## 2019-09-04 PROCEDURE — 36415 COLL VENOUS BLD VENIPUNCTURE: CPT

## 2019-09-04 PROCEDURE — 81001 URINALYSIS AUTO W/SCOPE: CPT

## 2019-09-04 PROCEDURE — 93005 ELECTROCARDIOGRAM TRACING: CPT

## 2019-09-04 PROCEDURE — 85027 COMPLETE CBC AUTOMATED: CPT

## 2019-09-04 PROCEDURE — 87086 URINE CULTURE/COLONY COUNT: CPT

## 2019-09-04 PROCEDURE — 80048 BASIC METABOLIC PNL TOTAL CA: CPT

## 2019-09-05 ENCOUNTER — TELEPHONE (OUTPATIENT)
Dept: CARDIOLOGY CLINIC | Age: 69
End: 2019-09-05

## 2019-09-05 LAB
ATRIAL RATE: 73 BPM
BACTERIA SPEC CULT: NORMAL
CALCULATED P AXIS, ECG09: 45 DEGREES
CALCULATED R AXIS, ECG10: -91 DEGREES
CALCULATED T AXIS, ECG11: -116 DEGREES
DIAGNOSIS, 93000: NORMAL
P-R INTERVAL, ECG05: 154 MS
Q-T INTERVAL, ECG07: 472 MS
QRS DURATION, ECG06: 174 MS
QTC CALCULATION (BEZET), ECG08: 519 MS
SERVICE CMNT-IMP: NORMAL
VENTRICULAR RATE, ECG03: 73 BPM

## 2019-09-05 NOTE — TELEPHONE ENCOUNTER
Surgical clearance request received from urology of [facility] for sp tube placement on 9/17/19. Patient cleared by Dr. Galen Vickers. High risk. Form signed and faxed back to the office.

## 2019-09-10 NOTE — PROGRESS NOTES
Janeen Man presents today for a follow-up. She was supposed to be seen by Dr. Rosenda Hendrickson on 9/3/19 but she cancelled her appointment. When I saw her on 8/19/19, she was doing [place]. \"  I requested a BMP and NT pro-BNP and it showed her BUN to be 39 and Creatinine 1.67. Her NT pro-BNP was 1684. Since that visit, she has been cleared to proceed with a urologic procedure (she was considering a suprapubic tube as she has had problems with recurrent UTIs due to her indwelling Krueger catheter). She is scheduled for a cystoscopy and suprapubic tube placement on 9/17/19. She is a 71year old female with a history of a dilated cardiomyopathy, mild left ventricular dysfunction, and widely patent coronary arteries (cardiac catheterization done in August 1996). She also has a biventricular pacemaker but not an AICD placed in October of 2012 (in abdomen). She previously had a biventricular ICD but it was removed due to infection at the site. She was last seen by Dr. Rosenda Hendrickson on March 12, 2018 and her last echocardiogram was done in May 2017 which showed ejection fraction of 50%, no wall motion abnormalities, RVSP of 39 mmHg. The stress test was completed on 6/19/15 showed no reversible perfusion imaging abnormalities concerning for myocardial ischemia. In April 2017, she fell at home, traumatized her back and developed an infected right psoas hematoma with development of an epidural abscess with neurological compromise with resulting paraplegia. She had her pacemaker generator changed by Dr. Pan Half back on February 26, 2019. She had an echocardiogram done on May 16, 2019 showed an ejection fraction 26 to 30%. She is now on Entresto 49/51 BID and remains on Lasix 40mg daily. Cardiac wise, she is feeling okay. She denies any chest pain, palpitations, and shortness of breath at rest.  She denies chest pain, tightness, heaviness, and palpitations.   She denies shortness of breath at rest, admits to some dyspnea on exertion, denies orthopnea and denies PND. She denies abdominal bloating. She denies lightheadedness, dizziness, and syncope. She admits to lower extremity edema and denies claudication. Denies nausea, vomiting, diarrhea, fever, chills. She admits to joint and neuropathy pain. She admits to peripheral neuropathy. She has an infection in her right foot which appears to be healing. She states that she has a little bit more swelling in the right leg than in the left. PMH:  Past Medical History:   Diagnosis Date    Acute paraplegia ([address] 4/20/2017    Benign hypertensive heart disease with systolic CHF, NYHA class 2 ([address] 9/5/2012    Biventricular implantable cardioverter-defibrillator in situ 04/28/2005    Upgraded to BiV AICD; gen change 4/2008; pocket revision 10/2009; Abdominal - done on 8/22/2012 by Dr. Regina Whitehead Cardiac cath 08/15/1996    Patent coronaries. Elev LVEDP. EF 50-55%.  Cardiac echocardiogram 06/23/2015    Ltd study. EF 45-50%. Mild, diffuse hypk. Severe apical hypk. No mass or thrombus was clearly identified, although imaging was suboptimal.      Cardiac nuclear imaging test 06/19/2015    Fixed distal apical, distal septal defect more likely due to RV pacing than prior infarct. No ischemia. EF 46%. RWMA c/w RV pacing. Nondiagnostic EKG on pharm stress test.      Cardiovascular lower extremity venous duplex 09/04/2012    Acute, non-occlusive DVT in CFV on right. No DVT on left. No superficial thrombosis bilaterally.  Cardiovascular upper extremity venous duplex 08/27/2012    DVT in axillary vein on left. Left subclavian was not visualized.     Chronic anemia 9/5/2012    Chronic systolic heart failure (HCC)     Decreased calculated glomerular filtration rate (GFR) 3/30/2017    Calculated GFR equivalent to that of CKD stage 3 = 30-59 ml/min    Diabetic neuropathy associated with type 2 diabetes mellitus ([address] 6/28/2011    Difficult airway for intubation 08/22/2012    see anesthesia airway note    Dyslipidemia 6/28/2011    Gout     History of complete heart block 6/28/2011    History of Coumadin therapy     Anticoagulation for DVT of the LUE; Discontinued on 3/30/2017    History of deep venous thrombosis 9/5/2012    Left upper extremity    History of pyelonephritis 3/30/2017    Left bundle branch block (LBBB) on electrocardiogram 6/28/2011    Nonischemic cardiomyopathy ([facility] 6/28/2011    Obesity (BMI 35.0-39.9 without comorbidity) 3/13/2017    Obstructive sleep apnea on CPAP 2/7/2012    Psoas abscess, right ([address] 4/20/2017    Psoas hematoma, right, secondary to anticoagulant therapy 3/30/2017    Type 2 diabetes mellitus with diabetic neuropathy ([facility] 6/28/2011       PSH:  Past Surgical History:   Procedure Laterality Date    HX CARPAL TUNNEL RELEASE  4/07    right     HX CHOLECYSTECTOMY  1994    HX HYSTERECTOMY  1973    HX OTHER SURGICAL  6/11/2012    AICD revision    HX PACEMAKER  4/28/2005    [employer] AICD    [place] REMVL PERM PM PLS GEN W/REPL PLSE GEN 2 LEAD SYS N/A 2/26/2019    REMOVE & REPLACE PPM GEN DUAL LEAD performed by Monica Doherty MD at [address] [facility]       MEDS:  Current Outpatient Medications   Medication Sig    exenatide microspheres (BYDUREON) 2 mg serr 2 mg by SubCUTAneous route every seven (7) days.  multivitamin (ONE A DAY) tablet Take 1 Tab by mouth daily.  sacubitril-valsartan (ENTRESTO) 49 mg/51 mg tablet Take 1 Tab by mouth two (2) times a day.  furosemide (LASIX) 80 mg tablet Take 1 Tab by mouth daily. (Patient taking differently: Take 40 mg by mouth daily.)    carvedilol (COREG) 6.25 mg tablet Take 1 Tab by mouth two (2) times a day.  diphenhydrAMINE (BENADRYL) 25 mg capsule TAKE 25MG NIGHT PRIOR TO PROCEDURE, AND TAKE 25MG 1 HOUR PRIOR TO PROCEDURE. (Patient taking differently: 25 mg every six (6) hours as needed.  TAKE 25MG NIGHT PRIOR TO PROCEDURE, AND TAKE 25MG 1 HOUR PRIOR TO PROCEDURE.)    fluticasone (FLONASE) 50 mcg/actuation nasal spray 1 spray each nostril BID (Patient taking differently: daily as needed. 1 spray each nostril BID)    metFORMIN ER (GLUCOPHAGE XR) 500 mg tablet Take 500 mg by mouth daily.  cholecalciferol, VITAMIN D3, (VITAMIN D3) 5,000 unit tab tablet Take  by mouth daily.  gabapentin (NEURONTIN) 300 mg capsule Take 2 Caps by mouth two (2) times a day. Indications: NEUROPATHIC PAIN    insulin lispro (HUMALOG) 100 unit/mL injection See sliding scale (Patient taking differently: 4 Units Before breakfast, lunch, and dinner. See sliding scale)    folic acid (FOLVITE) 1 mg tablet Take 1 Tab by mouth daily.  insulin glargine (LANTUS) 100 unit/mL injection 5 Units by SubCUTAneous route nightly. Indications: type 2 diabetes mellitus (Patient taking differently: 10 Units by SubCUTAneous route nightly.)     No current facility-administered medications for this visit. Allergies and Sensitivities:  Allergies   Allergen Reactions    Vancomycin Itching    Ampicillin Itching    Bactrim [Sulfamethoxazole-Trimethoprim] Unknown (comments)    Blueberry Swelling     Causes throat swelling    Ciprofloxacin Itching    Codeine Other (comments)     Jumpy feeling    Crestor [Rosuvastatin] Itching    Darvocet A500 [Propoxyphene N-Acetaminophen] Itching    Demerol [Meperidine] Itching    Levaquin [Levofloxacin] Itching    Lipitor [Atorvastatin] Myalgia    Magnesium Oxide Itching     nausea    Minocin [Minocycline] Unknown (comments)    Pcn [Penicillins] Itching    Pravachol [Pravastatin] Swelling     Swelling in mouth.    Not allergic per patient, takes at home    Shellfish Derived Unknown (comments)    Sulfa (Sulfonamide Antibiotics) Itching    Ultracet [Tramadol-Acetaminophen] Itching    Vicodin [Hydrocodone-Acetaminophen] Unknown (comments)    Vytorin 10-10 [Ezetimibe-Simvastatin] Myalgia    Percodan [Oxycodone Hcl-Oxycodone-Asa] Itching       Family History:  Family History   Problem Relation Age of Onset    Cancer Father         Leukemia       Social History:  She  reports that she has never smoked. She has never used smokeless tobacco.  She  reports that she drinks alcohol. Physical:  Visit Vitals  /62   Pulse 65   SpO2 99%         She is unable to weigh as she is non-weight bearing and paraplegic    Exam:  Neck:  Supple, no JVD, no carotid bruits  CV:  Normal S1 and  S2, grade II/VI DEE noted along the LSB, no rubs, or gallops noted  Lungs:  Clear to ausculation throughout, no wheezes or rales  Abd:  Soft, non-tender, obese, non-distended with good bowel sounds. No hepatosplenomegaly  Extremities:  Trace to 1+ softly pitting lower extremity edema, right greater than left      Data:  EKG:   Not done today      LABS:  Lab Results   Component Value Date/Time    Sodium 144 09/04/2019 10:04 AM    Potassium 4.5 09/04/2019 10:04 AM    Chloride 109 09/04/2019 10:04 AM    CO2 31 09/04/2019 10:04 AM    Glucose 108 (H) 09/04/2019 10:04 AM    BUN 34 (H) 09/04/2019 10:04 AM    Creatinine 1.66 (H) 09/04/2019 10:04 AM     Lab Results   Component Value Date/Time    Cholesterol, total 127 08/17/2018 02:40 AM    HDL Cholesterol 42 08/17/2018 02:40 AM    LDL, calculated 61.6 08/17/2018 02:40 AM    Triglyceride 117 08/17/2018 02:40 AM    CHOL/HDL Ratio 3.0 08/17/2018 02:40 AM     Lab Results   Component Value Date/Time    ALT (SGPT) 21 02/20/2019 11:57 AM     Impression / Plan:  1. Non-ischemic cardiomyopathy, with Bi-ventricular pacemaker but not ICD (located in abdomen)  2. Hypertension, blood pressure well-controlled  3. Chronic systolic heart failure,  NYHA class 2-3, stable  4. Diabetes, recommend Hgb A1c less than 7% from cardiac standpoint  5. Hypercholesterolemia, unable to tolerate statins  6. Fatigue  7. Recurrent UTIs, followed by urology      Mrs. Ovidio Perez was seen today for follow-up as she cancelled her appointment with Dr. Aldo Singh on 9/3/19.   During that appointment, her [employer] was going to be titrated to 97/103. She states that she has been feeling better since began taking Entresto. Her blood pressure is stable and her most recent BMP results are noted above. BUN/creatinine about the same over the past 2 months. Creatinine is 1.6. She has about 10 days left of her current dose of Entresto 49/51 and asked that she finished those tablets. A new prescription for Entresto 97/103 twice daily will be sent to her pharmacy dose. She will monitor her blood pressures at home and will call the office with the blood pressure reading she obtains 4 weeks (to the day) that she begins taking the Entresto 97/103. She was also given a requisition for a BMP to be done at that time. Her device was interrogated today and it did show continued elevated OptiVol levels. She also complains of a pulling sensation in her left abdomen which is new and that the same time, there was some impedance noted during the device check. The device is functioning appropriately. She has been asked to return on Thursday, 9/26/19, so the device can be checked by a company rep. Congestive heart failure teaching reinforced today. Advised to limit sodium intake to no more than 2000mg per day and to also watch fluid intake. Advised to weigh daily every morning and record weights. Instructed to call our office if progressive weight gain is noted over a 2 to 3 day period of time, shortness of breath increases, or if abdominal bloating, nausea, fatigue, or increased lower extremity edema is noted. She is scheduled for a cystoscopy and suprapubic tube insertion tomorrow, 9/17/19. She will follow-up with Dr. Galen Vickers as scheduled and PRN. Martha Pena MSN, FNP-BC    Please note:  Portions of this chart were created with Dragon medical speech to text program.  Unrecognized errors may be present.

## 2019-09-12 RX ORDER — BISMUTH SUBSALICYLATE 262 MG
1 TABLET,CHEWABLE ORAL DAILY
COMMUNITY

## 2019-09-16 ENCOUNTER — ANESTHESIA EVENT (OUTPATIENT)
Dept: SURGERY | Age: 69
End: 2019-09-16
Payer: COMMERCIAL

## 2019-09-16 ENCOUNTER — CLINICAL SUPPORT (OUTPATIENT)
Dept: CARDIOLOGY CLINIC | Age: 69
End: 2019-09-16

## 2019-09-16 ENCOUNTER — OFFICE VISIT (OUTPATIENT)
Dept: CARDIOLOGY CLINIC | Age: 69
End: 2019-09-16

## 2019-09-16 VITALS — SYSTOLIC BLOOD PRESSURE: 118 MMHG | HEART RATE: 65 BPM | DIASTOLIC BLOOD PRESSURE: 62 MMHG | OXYGEN SATURATION: 99 %

## 2019-09-16 DIAGNOSIS — E78.5 DYSLIPIDEMIA: ICD-10-CM

## 2019-09-16 DIAGNOSIS — Z95.810 BIVENTRICULAR IMPLANTABLE CARDIOVERTER-DEFIBRILLATOR IN SITU: ICD-10-CM

## 2019-09-16 DIAGNOSIS — I42.8 NONISCHEMIC CARDIOMYOPATHY (HCC): ICD-10-CM

## 2019-09-16 DIAGNOSIS — Z95.810 BIVENTRICULAR IMPLANTABLE CARDIOVERTER-DEFIBRILLATOR IN SITU: Primary | ICD-10-CM

## 2019-09-16 DIAGNOSIS — I50.22 CHRONIC SYSTOLIC HEART FAILURE (HCC): Primary | ICD-10-CM

## 2019-09-16 DIAGNOSIS — R60.9 EDEMA, UNSPECIFIED TYPE: ICD-10-CM

## 2019-09-16 NOTE — PROGRESS NOTES
Janie Leal presents today for No chief complaint on file. Janie Leal preferred language for health care discussion is english/other. Is someone accompanying this pt? no    Is the patient using any DME equipment during [address]? wheelchair    Depression Screening:  3 most recent PHQ Screens 7/15/2019   Little interest or pleasure in doing things Not at all   Feeling down, depressed, irritable, or hopeless Not at all   Total Score PHQ 2 0       Learning Assessment:  Learning Assessment 5/8/2019   PRIMARY LEARNER Patient   HIGHEST LEVEL OF EDUCATION - PRIMARY LEARNER  -   BARRIERS PRIMARY LEARNER -   CO-LEARNER CAREGIVER -   PRIMARY LANGUAGE ENGLISH   LEARNER PREFERENCE PRIMARY DEMONSTRATION   ANSWERED BY patient   RELATIONSHIP SELF       Abuse Screening:  Abuse Screening Questionnaire 5/8/2019   Do you ever feel afraid of your partner? N   Are you in a relationship with someone who physically or mentally threatens you? N   Is it safe for you to go home? Y       Fall Risk  Fall Risk Assessment, last 12 mths 7/15/2019   Able to walk? Yes   Fall in past 12 months? No       Pt currently taking Anticoagulant therapy? Coordination of Care:  1. Have you been to the ER, urgent care clinic since your last visit? Hospitalized since your last visit? no    2. Have you seen or consulted any other health care providers outside of the [address] [place] [address] since your last visit? Include any pap smears or colon screening.  no

## 2019-09-16 NOTE — PATIENT INSTRUCTIONS
Increase Entresto to 97/103 twice a day  All other medications to remain the same  Monitor blood pressures at home and monitor and call the office with the blood pressure reading obtained 4 weeks from starting the dose  BMP to be done after being on the increased dose of Entresto for 4 weeks  In-office device check next week as scheduled  Follow-up with Dr. Nabil Garay as scheduled and as needed

## 2019-09-17 ENCOUNTER — ANESTHESIA (OUTPATIENT)
Dept: SURGERY | Age: 69
End: 2019-09-17
Payer: COMMERCIAL

## 2019-09-17 ENCOUNTER — HOSPITAL ENCOUNTER (OUTPATIENT)
Age: 69
Discharge: HOME | End: 2019-09-17
Attending: UROLOGY | Admitting: UROLOGY
Payer: COMMERCIAL

## 2019-09-17 VITALS
SYSTOLIC BLOOD PRESSURE: 134 MMHG | DIASTOLIC BLOOD PRESSURE: 79 MMHG | HEIGHT: 67 IN | RESPIRATION RATE: 20 BRPM | HEART RATE: 72 BPM | WEIGHT: 236 LBS | TEMPERATURE: 96.8 F | OXYGEN SATURATION: 98 % | BODY MASS INDEX: 37.04 KG/M2

## 2019-09-17 DIAGNOSIS — R33.9 URINARY RETENTION: Primary | ICD-10-CM

## 2019-09-17 LAB
GLUCOSE BLD STRIP.AUTO-MCNC: 107 MG/DL (ref 70–110)
GLUCOSE BLD STRIP.AUTO-MCNC: 134 MG/DL (ref 70–110)

## 2019-09-17 PROCEDURE — 77030020442 HC NDL ACS RENAL BSC -B: Performed by: UROLOGY

## 2019-09-17 PROCEDURE — 77030012863 HC BG URIN LEG HOLL -A: Performed by: UROLOGY

## 2019-09-17 PROCEDURE — 74011250636 HC RX REV CODE- 250/636: Performed by: NURSE ANESTHETIST, CERTIFIED REGISTERED

## 2019-09-17 PROCEDURE — 74011000250 HC RX REV CODE- 250

## 2019-09-17 PROCEDURE — 76010000138 HC OR TIME 0.5 TO 1 HR: Performed by: UROLOGY

## 2019-09-17 PROCEDURE — 77030040361 HC SLV COMPR DVT MDII -B: Performed by: UROLOGY

## 2019-09-17 PROCEDURE — 77030013553 HC DIL URETH MEATAL COOK -C: Performed by: UROLOGY

## 2019-09-17 PROCEDURE — 76210000026 HC REC RM PH II 1 TO 1.5 HR: Performed by: UROLOGY

## 2019-09-17 PROCEDURE — 77030020782 HC GWN BAIR PAWS FLX 3M -B: Performed by: UROLOGY

## 2019-09-17 PROCEDURE — 77030012961 HC IRR KT CYSTO/TUR ICUM -A: Performed by: UROLOGY

## 2019-09-17 PROCEDURE — 77030018836 HC SOL IRR NACL ICUM -A: Performed by: UROLOGY

## 2019-09-17 PROCEDURE — 77030034696 HC CATH URETH FOL 2W BARD -A: Performed by: UROLOGY

## 2019-09-17 PROCEDURE — 74011250636 HC RX REV CODE- 250/636

## 2019-09-17 PROCEDURE — 76210000016 HC OR PH I REC 1 TO 1.5 HR: Performed by: UROLOGY

## 2019-09-17 PROCEDURE — C1769 GUIDE WIRE: HCPCS | Performed by: UROLOGY

## 2019-09-17 PROCEDURE — 74011250637 HC RX REV CODE- 250/637: Performed by: UROLOGY

## 2019-09-17 PROCEDURE — 76060000032 HC ANESTHESIA 0.5 TO 1 HR: Performed by: UROLOGY

## 2019-09-17 PROCEDURE — 74011250636 HC RX REV CODE- 250/636: Performed by: UROLOGY

## 2019-09-17 PROCEDURE — 82962 GLUCOSE BLOOD TEST: CPT

## 2019-09-17 RX ORDER — OXYCODONE AND ACETAMINOPHEN 5; 325 MG/1; MG/1
1 TABLET ORAL
Qty: 12 TAB | Refills: 0 | Status: SHIPPED | OUTPATIENT
Start: 2019-09-17 | End: 2019-09-20

## 2019-09-17 RX ORDER — LIDOCAINE HYDROCHLORIDE 10 MG/ML
0.1 INJECTION, SOLUTION EPIDURAL; INFILTRATION; INTRACAUDAL; PERINEURAL AS NEEDED
Status: DISCONTINUED | OUTPATIENT
Start: 2019-09-17 | End: 2019-09-17 | Stop reason: HOSPADM

## 2019-09-17 RX ORDER — MAGNESIUM SULFATE 100 %
4 CRYSTALS MISCELLANEOUS AS NEEDED
Status: DISCONTINUED | OUTPATIENT
Start: 2019-09-17 | End: 2019-09-17 | Stop reason: HOSPADM

## 2019-09-17 RX ORDER — SODIUM CHLORIDE 0.9 % (FLUSH) 0.9 %
5-40 SYRINGE (ML) INJECTION AS NEEDED
Status: DISCONTINUED | OUTPATIENT
Start: 2019-09-17 | End: 2019-09-17 | Stop reason: HOSPADM

## 2019-09-17 RX ORDER — FENTANYL CITRATE 50 UG/ML
50 INJECTION, SOLUTION INTRAMUSCULAR; INTRAVENOUS AS NEEDED
Status: DISCONTINUED | OUTPATIENT
Start: 2019-09-17 | End: 2019-09-17 | Stop reason: HOSPADM

## 2019-09-17 RX ORDER — GENTAMICIN SULFATE 60 MG/50ML
120 INJECTION, SOLUTION INTRAVENOUS ONCE
Status: COMPLETED | OUTPATIENT
Start: 2019-09-17 | End: 2019-09-17

## 2019-09-17 RX ORDER — PROPOFOL 10 MG/ML
INJECTION, EMULSION INTRAVENOUS AS NEEDED
Status: DISCONTINUED | OUTPATIENT
Start: 2019-09-17 | End: 2019-09-17 | Stop reason: HOSPADM

## 2019-09-17 RX ORDER — FAMOTIDINE 20 MG/1
20 TABLET, FILM COATED ORAL ONCE
Status: COMPLETED | OUTPATIENT
Start: 2019-09-17 | End: 2019-09-17

## 2019-09-17 RX ORDER — DEXTROSE 50 % IN WATER (D50W) INTRAVENOUS SYRINGE
25-50 AS NEEDED
Status: DISCONTINUED | OUTPATIENT
Start: 2019-09-17 | End: 2019-09-17 | Stop reason: HOSPADM

## 2019-09-17 RX ORDER — FENTANYL CITRATE 50 UG/ML
INJECTION, SOLUTION INTRAMUSCULAR; INTRAVENOUS AS NEEDED
Status: DISCONTINUED | OUTPATIENT
Start: 2019-09-17 | End: 2019-09-17 | Stop reason: HOSPADM

## 2019-09-17 RX ORDER — ONDANSETRON 2 MG/ML
INJECTION INTRAMUSCULAR; INTRAVENOUS AS NEEDED
Status: DISCONTINUED | OUTPATIENT
Start: 2019-09-17 | End: 2019-09-17 | Stop reason: HOSPADM

## 2019-09-17 RX ORDER — INSULIN LISPRO 100 [IU]/ML
INJECTION, SOLUTION INTRAVENOUS; SUBCUTANEOUS ONCE
Status: DISCONTINUED | OUTPATIENT
Start: 2019-09-17 | End: 2019-09-17 | Stop reason: HOSPADM

## 2019-09-17 RX ORDER — SODIUM CHLORIDE, SODIUM LACTATE, POTASSIUM CHLORIDE, CALCIUM CHLORIDE 600; 310; 30; 20 MG/100ML; MG/100ML; MG/100ML; MG/100ML
25 INJECTION, SOLUTION INTRAVENOUS CONTINUOUS
Status: DISCONTINUED | OUTPATIENT
Start: 2019-09-17 | End: 2019-09-17 | Stop reason: HOSPADM

## 2019-09-17 RX ORDER — NALOXONE HYDROCHLORIDE 0.4 MG/ML
0.1 INJECTION, SOLUTION INTRAMUSCULAR; INTRAVENOUS; SUBCUTANEOUS AS NEEDED
Status: DISCONTINUED | OUTPATIENT
Start: 2019-09-17 | End: 2019-09-17 | Stop reason: HOSPADM

## 2019-09-17 RX ORDER — SODIUM CHLORIDE, SODIUM LACTATE, POTASSIUM CHLORIDE, CALCIUM CHLORIDE 600; 310; 30; 20 MG/100ML; MG/100ML; MG/100ML; MG/100ML
50 INJECTION, SOLUTION INTRAVENOUS CONTINUOUS
Status: DISCONTINUED | OUTPATIENT
Start: 2019-09-17 | End: 2019-09-17 | Stop reason: HOSPADM

## 2019-09-17 RX ORDER — FAMOTIDINE 20 MG/1
20 TABLET, FILM COATED ORAL ONCE
Status: DISCONTINUED | OUTPATIENT
Start: 2019-09-17 | End: 2019-09-17

## 2019-09-17 RX ORDER — MIDAZOLAM HYDROCHLORIDE 1 MG/ML
INJECTION, SOLUTION INTRAMUSCULAR; INTRAVENOUS AS NEEDED
Status: DISCONTINUED | OUTPATIENT
Start: 2019-09-17 | End: 2019-09-17 | Stop reason: HOSPADM

## 2019-09-17 RX ORDER — DIPHENHYDRAMINE HYDROCHLORIDE 50 MG/ML
12.5 INJECTION, SOLUTION INTRAMUSCULAR; INTRAVENOUS
Status: DISCONTINUED | OUTPATIENT
Start: 2019-09-17 | End: 2019-09-17 | Stop reason: HOSPADM

## 2019-09-17 RX ORDER — SODIUM CHLORIDE 0.9 % (FLUSH) 0.9 %
5-40 SYRINGE (ML) INJECTION EVERY 8 HOURS
Status: DISCONTINUED | OUTPATIENT
Start: 2019-09-17 | End: 2019-09-17 | Stop reason: HOSPADM

## 2019-09-17 RX ORDER — LIDOCAINE HYDROCHLORIDE 20 MG/ML
INJECTION, SOLUTION EPIDURAL; INFILTRATION; INTRACAUDAL; PERINEURAL AS NEEDED
Status: DISCONTINUED | OUTPATIENT
Start: 2019-09-17 | End: 2019-09-17 | Stop reason: HOSPADM

## 2019-09-17 RX ORDER — HYDROMORPHONE HYDROCHLORIDE 2 MG/ML
0.5 INJECTION, SOLUTION INTRAMUSCULAR; INTRAVENOUS; SUBCUTANEOUS
Status: DISCONTINUED | OUTPATIENT
Start: 2019-09-17 | End: 2019-09-17 | Stop reason: HOSPADM

## 2019-09-17 RX ADMIN — HYDROMORPHONE HYDROCHLORIDE 0.5 MG: 2 INJECTION INTRAMUSCULAR; INTRAVENOUS; SUBCUTANEOUS at 11:50

## 2019-09-17 RX ADMIN — DIPHENHYDRAMINE HYDROCHLORIDE 12.5 MG: 50 INJECTION INTRAMUSCULAR; INTRAVENOUS at 12:09

## 2019-09-17 RX ADMIN — HYDROMORPHONE HYDROCHLORIDE 0.5 MG: 2 INJECTION INTRAMUSCULAR; INTRAVENOUS; SUBCUTANEOUS at 11:40

## 2019-09-17 RX ADMIN — ONDANSETRON 4 MG: 2 INJECTION INTRAMUSCULAR; INTRAVENOUS at 10:49

## 2019-09-17 RX ADMIN — DIPHENHYDRAMINE HYDROCHLORIDE 12.5 MG: 50 INJECTION INTRAMUSCULAR; INTRAVENOUS at 11:58

## 2019-09-17 RX ADMIN — SODIUM CHLORIDE, SODIUM LACTATE, POTASSIUM CHLORIDE, AND CALCIUM CHLORIDE 25 ML/HR: 600; 310; 30; 20 INJECTION, SOLUTION INTRAVENOUS at 09:34

## 2019-09-17 RX ADMIN — MIDAZOLAM HYDROCHLORIDE 2 MG: 1 INJECTION, SOLUTION INTRAMUSCULAR; INTRAVENOUS at 10:23

## 2019-09-17 RX ADMIN — FENTANYL CITRATE 25 MCG: 50 INJECTION, SOLUTION INTRAMUSCULAR; INTRAVENOUS at 10:56

## 2019-09-17 RX ADMIN — FAMOTIDINE 20 MG: 20 TABLET ORAL at 09:17

## 2019-09-17 RX ADMIN — HYDROMORPHONE HYDROCHLORIDE 0.5 MG: 2 INJECTION INTRAMUSCULAR; INTRAVENOUS; SUBCUTANEOUS at 11:30

## 2019-09-17 RX ADMIN — GENTAMICIN SULFATE 120 MG: 60 INJECTION, SOLUTION INTRAVENOUS at 10:37

## 2019-09-17 RX ADMIN — LIDOCAINE HYDROCHLORIDE 100 MG: 20 INJECTION, SOLUTION EPIDURAL; INFILTRATION; INTRACAUDAL; PERINEURAL at 10:28

## 2019-09-17 RX ADMIN — PROPOFOL 150 MG: 10 INJECTION, EMULSION INTRAVENOUS at 10:28

## 2019-09-17 NOTE — ANESTHESIA POSTPROCEDURE EVALUATION
Procedure(s):  CYSTOSCOPY/SUPRAPUBIC TUBE PLACEMENT. general    Anesthesia Post Evaluation      Multimodal analgesia: multimodal analgesia used between 6 hours prior to anesthesia start to PACU discharge  Patient location during evaluation: bedside  Patient participation: complete - patient participated  Level of consciousness: awake  Pain management: adequate  Airway patency: patent  Anesthetic complications: no  Cardiovascular status: stable  Respiratory status: acceptable  Hydration status: acceptable  Post anesthesia nausea and vomiting:  controlled      Vitals Value Taken Time   /68 9/17/2019 12:17 PM   Temp 36.4 °C (97.5 °F) 9/17/2019 11:22 AM   Pulse 73 9/17/2019 12:19 PM   Resp 11 9/17/2019 12:19 PM   SpO2 98 % 9/17/2019 12:20 PM   Vitals shown include unvalidated device data.

## 2019-09-17 NOTE — H&P
Consult Note    Patient: Annel Patton               Sex: female          DOA: 9/17/2019       YOB: 1950      Age:  71 y.o.        LOS:  LOS: 0 days              ASSESSMENT:   1. Chronic urinary incontinence managed with a perez catheter since 2017     2. Paraplegia s/p spinal chord injury       3. S/p CVA     4. Diabetes    Ongoing urethral erosion, recommended proceeding to SPT. Understands may still leak and then require bladder neck sling if so. Missy Goodell, MD  [phone] Office  [phone]  Pager    CC: Neurogenic bladder     HISTORY OF PRESENT ILLNESS:  Annel Patton is a 71 y.o. female with incontinence and neurogenic bladder here today for SP tube placement. She had been counciled towards this a year ago due to concern for possible urethral erosion. She declined. The erosion progressed. Now she is amenable to SP. She has been on macrobid x 1 week. Urine is clear. No symptoms. No flowsheet data found. Past Medical History:   Diagnosis Date    Acute paraplegia ([facility] [address] 4/20/2017    Benign hypertensive heart disease with systolic CHF, NYHA class 2 ([facility] [address] 9/5/2012    Biventricular implantable cardioverter-defibrillator in situ 04/28/2005    Upgraded to BiV AICD; gen change 4/2008; pocket revision 10/2009; Abdominal - done on 8/22/2012 by Dr. Jalil Garduno Cardiac cath 08/15/1996    Patent coronaries. Elev LVEDP. EF 50-55%.  Cardiac echocardiogram 06/23/2015    Ltd study. EF 45-50%. Mild, diffuse hypk. Severe apical hypk. No mass or thrombus was clearly identified, although imaging was suboptimal.      Cardiac nuclear imaging test 06/19/2015    Fixed distal apical, distal septal defect more likely due to RV pacing than prior infarct. No ischemia. EF 46%. RWMA c/w RV pacing. Nondiagnostic EKG on pharm stress test.      Cardiovascular lower extremity venous duplex 09/04/2012    Acute, non-occlusive DVT in CFV on right. No DVT on left.   No superficial thrombosis bilaterally.  Cardiovascular upper extremity venous duplex 08/27/2012    DVT in axillary vein on left. Left subclavian was not visualized.     Chronic anemia 9/5/2012    Chronic systolic heart failure (HCC)     Decreased calculated glomerular filtration rate (GFR) 3/30/2017    Calculated GFR equivalent to that of CKD stage 3 = 30-59 ml/min    Diabetic neuropathy associated with type 2 diabetes mellitus ([address] 6/28/2011    Difficult airway for intubation 08/22/2012    see anesthesia airway note    Dyslipidemia 6/28/2011    Gout     History of complete heart block 6/28/2011    History of Coumadin therapy     Anticoagulation for DVT of the LUE; Discontinued on 3/30/2017    History of deep venous thrombosis 9/5/2012    Left upper extremity    History of pyelonephritis 3/30/2017    Left bundle branch block (LBBB) on electrocardiogram 6/28/2011    Nonischemic cardiomyopathy ([address] 6/28/2011    Obesity (BMI 35.0-39.9 without comorbidity) 3/13/2017    Obstructive sleep apnea on CPAP 2/7/2012    Psoas abscess, right ([address] 4/20/2017    Psoas hematoma, right, secondary to anticoagulant therapy 3/30/2017    Type 2 diabetes mellitus with diabetic neuropathy ([address] 6/28/2011       Past Surgical History:   Procedure Laterality Date    HX CARPAL TUNNEL RELEASE  4/07    right     HX CHOLECYSTECTOMY  1994    HX HYSTERECTOMY  1973    HX OTHER SURGICAL  6/11/2012    AICD revision    HX PACEMAKER  4/28/2005    [employer] AICD    TN REMVL PERM PM PLS GEN W/REPL PLSE GEN 2 LEAD SYS N/A 2/26/2019    REMOVE & REPLACE PPM GEN DUAL LEAD performed by Soledad Levi MD at [facility] CATH LAB       Social History     Tobacco Use    Smoking status: Never Smoker    Smokeless tobacco: Never Used   Substance Use Topics    Alcohol use: Not Currently    Drug use: No       Allergies   Allergen Reactions    Vancomycin Itching    Ampicillin Itching    Bactrim [Sulfamethoxazole-Trimethoprim] Itching  Blueberry Swelling     Causes throat swelling    Ciprofloxacin Itching    Codeine Other (comments)     Jumpy feeling    Crestor [Rosuvastatin] Itching    Darvocet A500 [Propoxyphene N-Acetaminophen] Itching    Demerol [Meperidine] Itching    Levaquin [Levofloxacin] Itching    Lipitor [Atorvastatin] Myalgia    Magnesium Oxide Itching     nausea    Minocin [Minocycline] Itching    Pcn [Penicillins] Itching    Pravachol [Pravastatin] Swelling     Swelling in mouth. Not allergic per patient, takes at home    Shellfish Derived Swelling     itching    Sulfa (Sulfonamide Antibiotics) Itching    Ultracet [Tramadol-Acetaminophen] Itching    Vicodin [Hydrocodone-Acetaminophen] Itching    Vytorin 10-10 [Ezetimibe-Simvastatin] Myalgia    Percodan [Oxycodone Hcl-Oxycodone-Asa] Itching       Family History   Problem Relation Age of Onset    Cancer Father         Leukemia       Current Facility-Administered Medications   Medication Dose Route Frequency Provider Last Rate Last Dose    lidocaine (PF) (XYLOCAINE) 10 mg/mL (1 %) injection 0.1 mL  0.1 mL SubCUTAneous PRN Batsheva Randhawa CRNA        lactated Ringers infusion  25 mL/hr IntraVENous CONTINUOUS Batsheva Randhawa CRNA 25 mL/hr at 09/17/19 0934 25 mL/hr at 09/17/19 0934    sodium chloride (NS) flush 5-40 mL  5-40 mL IntraVENous Q8H Batsheva Randhawa CRNA        sodium chloride (NS) flush 5-40 mL  5-40 mL IntraVENous PRN Batsheva Randhawa CRNA        insulin lispro (HUMALOG) injection   SubCUTAneous Sammie Hernandez CRNA        gentamicin in saline (iso-osm) (GARAMYCIN) IVPB 120 mg  120 mg IntraVENous ONCE Alexandra Rosado MD           Review of Systems  Constitutional: No fever, chills, or weight loss  Respiratory: No dyspnea  Cardiovascular: No chest pain  Gastrointestinal: No vomiting or abdominal pain. Genitourinary: Denies frequency, urgency, dysuria, hematuria. Neurological: No focal motor changes.      PHYSICAL EXAMINATION:   Visit Vitals  /66   Pulse 79   Temp 98.2 °F (36.8 °C)   Resp 20   Ht 5' 7\" (1.702 m)   Wt 236 lb (107 kg)   LMP  (LMP Unknown)   SpO2 100%   BMI 36.96 kg/m²     Constitutional: Well developed, well nourished female. No acute distress. HEENT: Normocephalic, Atraumatic, EOM's intact   CV:  Normal radial pulse. Respiratory: No respiratory distress or difficulties breathing   Abdomen:  Nontender, nondistended. :  No CVA tenderness. Krueger in place draining CYU. Skin: No evidence of jaundice. Normal color  Neuro/Psych:  Alert and oriented. Affect appropriate. Lymphatic:   No enlarged inguinal lymph nodes. REVIEW OF LABS AND IMAGING:      Labs: Results:   Chemistry  No results for input(s): GLU, NA, K, CL, CO2, BUN, CREA, CA, AGAP, BUCR, TBIL, GPT, AP, TP, ALB, GLOB, AGRAT in the last 72 hours. CBC w/Diff No results for input(s): WBC, RBC, HGB, HCT, PLT, GRANS, LYMPH, EOS, HGBEXT, HCTEXT, PLTEXT in the last 72 hours. Cultures No results for input(s): CULT in the last 72 hours.   All Micro Results     None            Urinalysis Color   Date Value Ref Range Status   09/04/2019 YELLOW   Final     Appearance   Date Value Ref Range Status   09/04/2019 CLOUDY   Final     Specific gravity   Date Value Ref Range Status   09/04/2019 1.011 1.005 - 1.030   Final     pH (UA)   Date Value Ref Range Status   09/04/2019 7.5 5.0 - 8.0   Final     Protein   Date Value Ref Range Status   09/04/2019 TRACE (A) NEG mg/dL Final     Ketone   Date Value Ref Range Status   09/04/2019 NEGATIVE  NEG mg/dL Final     Bilirubin   Date Value Ref Range Status   09/04/2019 NEGATIVE  NEG   Final     Blood   Date Value Ref Range Status   09/04/2019 TRACE (A) NEG   Final     Urobilinogen   Date Value Ref Range Status   09/04/2019 0.2 0.2 - 1.0 EU/dL Final     Nitrites   Date Value Ref Range Status   09/04/2019 NEGATIVE  NEG   Final     Leukocyte Esterase   Date Value Ref Range Status   09/04/2019 LARGE (A) NEG   Final     Potassium Date Value Ref Range Status   09/04/2019 4.5 3.5 - 5.5 mmol/L Final     Creatinine   Date Value Ref Range Status   09/04/2019 1.66 (H) 0.6 - 1.3 MG/DL Final     BUN   Date Value Ref Range Status   09/04/2019 34 (H) 7.0 - 18 MG/DL Final      PSA No results for input(s): PSA in the last 72 hours. Coagulation Lab Results   Component Value Date/Time    Prothrombin time 12.3 02/20/2019 11:57 AM    Prothrombin time 13.2 12/28/2018 04:30 AM    INR 0.9 02/20/2019 11:57 AM    INR 1.0 12/28/2018 04:30 AM    aPTT 21.9 (L) 10/17/2018 02:10 PM    aPTT 31.0 09/04/2018 12:25 PM           US Results (most recent):  Results from Hospital Encounter encounter on 04/20/17   US CHEST    Narrative Ultrasound of the chest    HISTORY: Pleural effusion. COMPARISON: Chest x-ray May 16, 2017. FINDINGS: Images of the right-sided chest was performed. There is no significant  pleural fluid present for safe thoracentesis. Thoracentesis was not performed. Impression IMPRESSION: Insufficient volume of right pleural effusion for thoracentesis. chest    CT Results (most recent):   Results from Hospital Encounter encounter on 12/27/18   CT ABD PELV W CONT    Narrative CT ABDOMEN AND PELVIS WITH ENHANCEMENT    INDICATION: Paraplegic with Admitted with right foot infection suspicious for  osteomyelitis with dysphagia, nausea, abdominal pain. TECHNIQUE: Axial images obtained of the abdomen and pelvis following the  uneventful administration of  70 cc's Isovue-300 nonionic intravenous contrast.   Coronal and sagittal reformatted images of the abdomen and pelvis were obtained. All CT scans at this facility are performed using dose optimization technique as  appropriate to a performed exam, to include automated exposure control,  adjustment of the mA and/or kV according to patient size (including appropriate  matching first site-specific examinations), or use of iterative reconstruction  technique.     COMPARISON: 6/8/2017; CT chest 9/4/2018. ABDOMEN FINDINGS:     Liver: Unremarkable. Spleen: Similar mildly enlarged measuring 13.2 cm in AP diameter. Pancreas: Unremarkable. Biliary: Cholecystectomy. No biliary ductal dilation. Bowel: Moderate colonic stool burden in the colon and rectum. Normal appendix. Peritoneum/ Retroperitoneum: The prior right psoas enlargement and abscess have  resolved. No ascites or free fluid. Lymph Nodes: Unremarkable. Adrenal Glands: Unremarkable. Kidneys: Cortical scarring right greater than left. There is a similar sized 1.5  cm exophytic lesion at the right upper renal pole with density greater than  water since at least 6/8/2017. Size is reassuring for benign or indolent lesion. There is associated cortical scarring. Similar 6 cm exophytic left lower pole  renal cyst. Similar to small to characterize right interpolar renal lesion. Vessels: Mild atherosclerosis. Infrarenal IVC filter in place. IVC and renal  veins are flattened. PELVIS FINDINGS:     Bladder/ Pelvic Organs: Bladder is decompressed with Krueger in place. Status post  hysterectomy. No suspicious adnexal lesions. No free fluid in the pelvis. Lung Base: Status post CABG. Epicardial pacer leads. Left anterior abdominal  wall cardiac device. Similar region of subsegmental atelectasis or scarring at  the right greater than left lung bases. There is associated mild bronchiectasis. Bones: Sarcopenia compatible with history of paraplegia. Incompletely visualized  median sternotomy. Lumbar facet hypertrophy. Osteopenia. There is a linear 3.8 x  1.6 cm soft tissue density within the soft tissues overlying the left  coccyx/medial gluteal fold (91). No fluid density. Underlying cortex is intact. Degenerative changes left greater than right hips. Degenerative disc disease. Impression IMPRESSION:    1. Moderate colonic stool burden; correlate for constipation.   2.  Indeterminate new superficial soft tissue density near the coccyx/left  medial gluteal fold without fluid density. This could represent phlegmon or  scarring. No underlying findings of osteomyelitis. 3.  Resolved right psoas abscess. 4.  Hypovolemia. 5.  Similar mild splenomegaly. 6.  Lung base bronchiectasis, likely sequela of prior infection. ABD      MRI Results (most recent): No procedure found. No diagnosis found.

## 2019-09-17 NOTE — DISCHARGE INSTRUCTIONS
Discharge Instructions      Patient: Theodora Garcia               Sex: female          DOA: 9/17/2019         YOB: 1950      Age:  71 y.o.        LOS:  LOS: 0 days     ACUTE DIAGNOSES:  Urinary retention with urethral erosion    CHRONIC MEDICAL DIAGNOSES:  Problem List as of 9/17/2019 Date Reviewed: 9/16/2019          Codes Class Noted - Resolved    Urinary retention ICD-10-CM: R33.9  ICD-9-CM: 788.20  9/17/2019 - Present        Diabetic polyneuropathy associated with type 2 diabetes mellitus ([facility] ICD-10-CM: E11.42  ICD-9-CM: 250.60, 357.2  3/26/2019 - Present        Non-healing wound of right heel ICD-10-CM: S91.301A  ICD-9-CM: 892.1  3/26/2019 - Present        Infected wound ICD-10-CM: T14. 8XXA, L08.9  ICD-9-CM: 958.3  12/27/2018 - Present        Heel ulcer ([facility] ICD-10-CM: L97.409  ICD-9-CM: 707.14  10/17/2018 - Present        Osteomyelitis ([facility] ICD-10-CM: M86.9  ICD-9-CM: 730.20  8/16/2018 - Present        Foot osteomyelitis, right ([facility] ICD-10-CM: M86.9  ICD-9-CM: 730.27  8/16/2018 - Present        Severe obesity (BMI 35.0-39. 9) with comorbidity ([facility] ICD-10-CM: E66.01  ICD-9-CM: 278.01  5/24/2018 - Present        Obesity (BMI 30.0-34.9) ICD-10-CM: E66.9  ICD-9-CM: 278.00  5/4/2018 - Present        Type 2 diabetes with nephropathy ([facility] ICD-10-CM: E11.21  ICD-9-CM: 250.40, 583.81  3/12/2018 - Present        Neurogenic bladder ICD-10-CM: N31.9  ICD-9-CM: 596.54  10/12/2017 - Present        Biventricular cardiac pacemaker in situ ICD-10-CM: Z95.0  ICD-9-CM: V45.01  9/13/2017 - Present        Hypervolemia ICD-10-CM: E87.70  ICD-9-CM: 276.69  6/6/2017 - Present        Anemia ICD-10-CM: D64.9  ICD-9-CM: 285.9  6/6/2017 - Present        Acute paraplegia ([facility] ICD-10-CM: G82.20  ICD-9-CM: 344.1  4/20/2017 - Present        Sepsis ([facility] ICD-10-CM: A41.9  ICD-9-CM: 038.9, 995.91  4/20/2017 - Present        Psoas abscess, right ([facility] ICD-10-CM: N93.87  ICD-9-CM: 567.31  4/20/2017 - Present Krueger catheter in place on admission ICD-10-CM: Z96.0  ICD-9-CM: V45.89  4/20/2017 - Present        Urinary tract infection due to Enterococcus ICD-10-CM: N39.0, B95.2  ICD-9-CM: 599.0, 041.04  4/20/2017 - Present        Group B streptococcal infection ICD-10-CM: A49.1  ICD-9-CM: 041.02  4/20/2017 - Present        Gout (Chronic) ICD-10-CM: M10.9  ICD-9-CM: 274.9  Unknown - Present        History of Coumadin therapy ICD-10-CM: Z92.29  ICD-9-CM: V58.61  Unknown - Present    Overview Signed 4/6/2017 10:35 PM by Leyla Berger MD     Anticoagulation for chronic atrial fibrillation; Discontinued on 3/30/2017             Decreased calculated glomerular filtration rate (GFR) ICD-10-CM: R94.4  ICD-9-CM: 794.4  3/30/2017 - Present    Overview Signed 4/6/2017  5:47 PM by Leyla Berger MD     Calculated GFR equivalent to that of CKD stage 3 = 30-59 ml/min             History of pyelonephritis ICD-10-CM: E34.424  ICD-9-CM: V13.02  3/30/2017 - Present        Iliopsoas muscle hematoma ICD-10-CM: F63.59DB  ICD-9-CM: 924.00  3/30/2017 - Present        Psoas hematoma, right, secondary to anticoagulant therapy ICD-10-CM: S30. 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  ICD-9-CM: 996.01  10/8/2012 - Present        Benign hypertensive heart disease with systolic CHF, NYHA class 2 (HCC) (Chronic) ICD-10-CM: I11.0, I50.20  ICD-9-CM: 402.11, 428.20, 428.0  9/5/2012 - Present        Chronic anemia (Chronic) ICD-10-CM: D64.9  ICD-9-CM: 285.9  9/5/2012 - Present        History of deep venous thrombosis ICD-10-CM: N97.346  ICD-9-CM: V12.51  9/5/2012 - Present    Overview Addendum 4/6/2017  5:42 PM by Lukas Bryant MD     Left upper extremity             Anticoagulated on Coumadin ICD-10-CM: Z79.01  ICD-9-CM: V58.61  9/5/2012 - Present        Difficult airway for intubation ICD-10-CM: T88. 4XXA  ICD-9-CM: 999.9  8/22/2012 - Present    Overview Signed 8/23/2012  9:53 AM by Tereasa Bamberger, MD     see anesthesia airway note             AICD generator infection [facility]) ICD-10-CM: T82. 7XXA  ICD-9-CM: 996.61  8/20/2012 - Present    Overview Signed 8/20/2012  6:13 PM by Oliva Kimble MD     S/p explant 4 leads 8/20/12             Obstructive sleep apnea on CPAP (Chronic) ICD-10-CM: G47.33, Z99.89  ICD-9-CM: 327.23, V46.8  2/7/2012 - Present        Nonischemic cardiomyopathy (HCC) (Chronic) ICD-10-CM: I42.8  ICD-9-CM: 425.4  6/28/2011 - Present        History of complete heart block ICD-10-CM: Z86.79  ICD-9-CM: V12.59  6/28/2011 - Present        Left bundle branch block (LBBB) on electrocardiogram (Chronic) ICD-10-CM: I44.7  ICD-9-CM: 426.3  6/28/2011 - Present        Type 2 diabetes mellitus with diabetic neuropathy (HCC) (Chronic) ICD-10-CM: E11.40  ICD-9-CM: 250.60, 357.2  6/28/2011 - Present        Dyslipidemia (Chronic) ICD-10-CM: E78.5  ICD-9-CM: 272.4  6/28/2011 - Present        Diabetic neuropathy associated with type 2 diabetes mellitus (HCC) (Chronic) ICD-10-CM: E11.40  ICD-9-CM: 250.60, 357.2  6/28/2011 - Present        Biventricular implantable cardioverter-defibrillator in situ ICD-10-CM: Z95.810  ICD-9-CM: V45.02  4/28/2005 - Present    Overview Addendum 4/6/2017 10:39 PM by Lukas Bryant MD     Upgraded to BiV AICD; gen change 4/2008; pocket revision 10/2009;  Abdominal - done on 8/22/2012 by Dr. Sera Alanman: Infection ICD-10-CM: B99.9  ICD-9-CM: 136.9  8/9/2012 - 9/5/2012        RESOLVED: Fatigue ICD-10-CM: R53.83  ICD-9-CM: 780.79  6/28/2011 - 9/5/2012        RESOLVED: Pacemaker ICD-10-CM: Z95.0  ICD-9-CM: V45.01  6/28/2011 - 6/28/2011 DIET: General     ACTIVITY:   You are restricted from lifting greater than 10 pounds for 1 week from the date of surgery until the urine is consistently clear, whichever is longer. You may resume driving once able to perform the activities of driving without pain. CATHETER:  Indwelling suprapubic catheter care:  1. Maintain sterile, closed gravity drainage system:          a. Secure the catheter to upper thigh or abdomen to avoid urethral irritation and contamination. b. Empty the catheter bag as needed. c. Do not routinely irrigate the catheter. d. Do not clamp or kink the drainage tubing, and keep the collection bag below bladder level at all times. 2.     If urine leaks around the catheter in the absence of obstruction, it is likely due to a bladder spasm. ADDITIONAL INFORMATION:     - If you experience any fevers > 100.4, significant nausea / vomiting, or significant worsening of pain, please contact us at the number below. - It is common to experience recurrent blood in your urine for several days to weeks after surgery. Although there should be a general trend of decreasing bleeding over time, it is not uncommon for bleeding to recur after periods of no bleeding. If you notice passage of clots, you should increase your liquid intake in order to reduce the number of clots encountered. If the bleeding continues to worsen with inability to pass clots, inability to urinate, or you experience significant light-headedness, please contact us. FOLLOW UP CARE:  You have an appointment in approximately 4 weeks for next catheter exchange. Information obtained by :  I understand that if any problems occur once I am at home I am to contact my physician. I understand and acknowledge receipt of the instructions indicated above. Physician's or R.N.'s Signature                                                                  Date/Time                                                                                                                                              Patient or Representative Signature                                                          Date/Time    Patient Education        Learning About Urinary Catheter Care to Prevent Infection  What is a urinary catheter? A urinary catheter is a flexible plastic tube used to drain urine from your bladder when you can't urinate on your own. The catheter allows urine to drain from the bladder into a bag. Two types of drainage bags may be used with a urinary catheter. · A bedside bag is a large bag that you can hang on the side of your bed or on a chair. You can use it overnight or anytime you will be sitting or lying down for a long time. · A leg bag is a small bag that you can use during the day. It is usually attached to your thigh or calf and hidden under your clothes. Having a urinary catheter increases your risk of getting a urinary tract infection. Germs may get on the catheter and cause an infection in your bladder or kidneys. The longer you have a catheter, the more likely it is that you will get an infection. You can help prevent this problem with good hygiene and careful handling of your catheter and drainage bags. How can you help prevent infection? Take care to be clean  · Always wash your hands well before and after you handle your catheter. · Clean the skin around the catheter twice a day using soap and water. Dry with a clean towel afterward. You can shower with your catheter and drainage bag in place unless your doctor told you not to. · When you clean around the catheter, check the surrounding skin for signs of infection. Look for things like pus or irritated, swollen, red, or tender skin around the catheter.   Be careful with your drainage bag  · Always keep the drainage bag below the level of your bladder. This will help keep urine from flowing back into your bladder. · Check often to see that urine is flowing through the catheter into the drainage bag. · Empty the drainage bag when it is half full. This will keep it from overflowing or backing up. · When you empty the drainage bag, do not let the tubing or drain spout touch anything. Be careful with your catheter  · Do not unhook the catheter from the drain tube. That could let germs get into the tube. · Make sure that the catheter tubing does not get twisted or kinked. · Do not tug or pull on the catheter. And make sure that the drainage bag does not drag or pull on the catheter. · Do not put powder or lotion on the skin around the catheter. · Talk with your doctor about your options for sexual intercourse while wearing a catheter. How do you empty a urine drainage bag? If your doctor has asked you to keep a record, write down the amount of urine in the bag before you empty it. Wash your hands before and after you touch the bag. 1. Remove the drain spout from its sleeve at the bottom of the drainage bag.  2. Open the valve on the drain spout. Let the urine flow out into the toilet or a container. Be careful not to let the tubing or drain spout touch anything. 3. After you empty the bag, close the valve. Then put the drain spout back into its sleeve at the bottom of the collection bag. How do you add a bedside bag to a leg bag? Wash your hands before and after you handle the bags. 1. Empty the leg bag attached to the catheter. 2. Put a clean towel under the leg bag.  3. Use an alcohol wipe to clean the tip of the bedside bag. Then connect the bedside bag to the leg bag. How can you clean a bedside drainage bag? Many people clean their bedside bag in the morning if they switch to a leg bag. To clean a bedside drainage ba.  Remove the bedside bag from the leg bag.  2. Fill the bag with 2 parts vinegar and 3 parts water. Let it stand for 20 minutes. 3. Empty the bag, and let it air dry. When should you call for help? Call your doctor now or seek immediate medical care if:  · You have symptoms of a urinary infection. These may include:  ? Pain or burning when you urinate. ? A frequent need to urinate without being able to pass much urine. ? Pain in the flank, which is just below the rib cage and above the waist on either side of the back. ? Blood in your urine. ? A fever. · Your urine smells bad. · You see large blood clots in your urine. · No urine or very little urine is flowing into the bag for 4 or more hours. Watch closely for changes in your health, and be sure to contact your doctor if:  · The area around the catheter becomes irritated, swollen, red, or tender, or there is pus draining from it. · Urine is leaking from the place where the catheter enters your body. Follow-up care is a key part of your treatment and safety. Be sure to make and go to all appointments, and call your doctor if you are having problems. It's also a good idea to know your test results and keep a list of the medicines you take. Where can you learn more? Go to http://aren-mirella.info/. Enter U010 in the search box to learn more about \"Learning About Urinary Catheter Care to Prevent Infection. \"  Current as of: December 19, 2018  Content Version: 12.1  © 3885-9465 [employer]. Care instructions adapted under license by [employer] (which disclaims liability or warranty for this information). If you have questions about a medical condition or this instruction, always ask your healthcare professional. [place] [address] any warranty or liability for your use of this information.       DISCHARGE SUMMARY from Nurse    PATIENT INSTRUCTIONS:    After general anesthesia or intravenous sedation, for 24 hours or while taking prescription Narcotics:  · Limit your activities  · Do not drive and operate hazardous machinery  · Do not make important personal or business decisions  · Do  not drink alcoholic beverages  · If you have not urinated within 8 hours after discharge, please contact your surgeon on call. Report the following to your surgeon:  · Excessive pain, swelling, redness or odor of or around the surgical area  · Temperature over 100.5  · Nausea and vomiting lasting longer than 4 hours or if unable to take medications  · Any signs of decreased circulation or nerve impairment to extremity: change in color, persistent  numbness, tingling, coldness or increase pain  · Any questions    What to do at Home:  Recommended activity: Activity as tolerated and no driving for today. *  Please give a list of your current medications to your Primary Care Provider. *  Please update this list whenever your medications are discontinued, doses are      changed, or new medications (including over-the-counter products) are added. *  Please carry medication information at all times in case of emergency situations. These are general instructions for a healthy lifestyle:    No smoking/ No tobacco products/ Avoid exposure to second hand smoke  Surgeon General's Warning:  Quitting smoking now greatly reduces serious risk to your health. Obesity, smoking, and sedentary lifestyle greatly increases your risk for illness    A healthy diet, regular physical exercise & weight monitoring are important for maintaining a healthy lifestyle    You may be retaining fluid if you have a history of heart failure or if you experience any of the following symptoms:  Weight gain of 3 pounds or more overnight or 5 pounds in a week, increased swelling in our hands or feet or shortness of breath while lying flat in bed. Please call your doctor as soon as you notice any of these symptoms; do not wait until your next office visit.         The discharge information has been reviewed with the patient and family member. The patient and family memeber verbalized understanding. Discharge medications reviewed with the patient and family member and appropriate educational materials and side effects teaching were provided.   ___________________________________________________________________________________________________________________________________

## 2019-09-17 NOTE — OP NOTES
Operative Note  Patient: Avila Klein               Sex: female             MRN: [id number]  YOB: 1950      Age:  71 y.o. Preoperative Diagnosis: R33.9 URINARY RETENTION, URETHRAL EROSION    Postoperative Diagnosis:  R33.9 URINARY RETENTION, URETHRAL EROSION    Surgeon: Swati Pratt MD    Fellow: Yanick Cameron MD    Indication: This is a 71year old female with neurogenic bladder managed with chronic indwelling Krueger catheter. She has developed a significant urethral erosion. After discussion of all management options, she elects to proceed with suprapubic tube placement. Procedure:    1) Cystoscopy  2) Insertion of suprapubic cystostomy tube    Findings:    1). Bladder was normal   2). Suprapubic tube placed without issues    Narrative of Events: The patient was brought to the operative suite. Anesthesia was induced and preoperative antibiotics were administered. They were then placed in the dorsal lithotomy position and their external genitalia and lower abdomen was prepped and draped in the usual fashion. A surgical timeout was performed confirming the patient's name, date of birth, laterality, and antibiotics. All were in agreement. A 22 Filipino cystourethroscope was then inserted into the patients bladder. The urethral meatus was patulous about 30 Filipino. The bladder neck was relatively continent appearing. Thorough cystoscopy was performed with both the 30 degree and 70 degree lens which revealed some bullous edema along the high posterior bladder wall consistent with catheter trauma, no masses, no stones in the bladder, both ureteral orifices visualized in orthotopic position with clear efflux of urine. In the midline about 2 fingerbreadths above the pubic symphysis,  The Bow scientific percutaneous access needle was used to enter the bladder near the dome under direct vision.   A super stiff Amplatz wire was passed through the needle and grasped with a flexible grasper and pulled out of the meatus establishing through and through access. A 1 cm nick in the skin was created with a 15 blade. Serial dilation of the tract was then performed using urethral dilators from 8F to 20F in two Arabic increments. An 01I silicon Krueger was then councilized and passed over the guidewire through the bladder and out of the urethral meatus. The cystoscope was then used to follow the catheter back into the bladder and it was seated nicely at the dome of the bladder where the balloon was instilled with 10 cc of sterile water. Clear urine was draining from the catheter. The catheter was doubly secured at the skin using 2-0 suture. The skin was cleansed and dried. Dry sterile dressings were applied. The catheter was connected to gravity drainage. The patient was then awakened and transported to the recovery room in satisfactory condition. Estimated Blood Loss: <5CC     Anesthesia:  General                  Implants: * No implants in log *    Specimens: * No specimens in log *     Drains: 18F suprapubic tube           Complications:  None           Counts: Sponge and needle counts were correct times two. Lucrecia Chou MD  9/17/2019    I was present for the entirety of the procedure and scrubbed.      Dr. Deb Henson

## 2019-09-17 NOTE — ANESTHESIA PREPROCEDURE EVALUATION
Relevant Problems   No relevant active problems       Anesthetic History     Increased risk of difficult airway (H/O difficult intubation)          Review of Systems / Medical History  Patient summary reviewed and pertinent labs reviewed    Pulmonary        Sleep apnea: CPAP           Neuro/Psych             Comments: Paraplegia due to spinal cord injury Cardiovascular          CHF        Exercise tolerance: <4 METS: Wheelchair bound     GI/Hepatic/Renal                Endo/Other    Diabetes: well controlled, type 2    Morbid obesity     Other Findings            Physical Exam    Airway  Mallampati: II  TM Distance: 4 - 6 cm  Neck ROM: normal range of motion   Mouth opening: Normal     Cardiovascular  Regular rate and rhythm,  S1 and S2 normal,  no murmur, click, rub, or gallop             Dental  No notable dental hx       Pulmonary  Breath sounds clear to auscultation               Abdominal  GI exam deferred       Other Findings            Anesthetic Plan    ASA: 4  Anesthesia type: general          Induction: Intravenous  Anesthetic plan and risks discussed with: Patient

## 2019-09-26 ENCOUNTER — CLINICAL SUPPORT (OUTPATIENT)
Dept: CARDIOLOGY CLINIC | Age: 69
End: 2019-09-26

## 2019-09-26 DIAGNOSIS — I42.8 NONISCHEMIC CARDIOMYOPATHY (HCC): Primary | ICD-10-CM

## 2019-09-26 DIAGNOSIS — Z95.810 BIVENTRICULAR IMPLANTABLE CARDIOVERTER-DEFIBRILLATOR IN SITU: ICD-10-CM

## 2019-09-26 NOTE — PROGRESS NOTES
I have personally seen and evaluated the device findings. Interrogation reviewed and I agree with assessment.     Kyrie Meza

## 2019-10-01 NOTE — PROCEDURES
[facility]  *** FINAL REPORT ***    Name: Blake Villa  MRN: [id number]    Inpatient  : 1950  HIS Order #: [id number]  [address] [facility] Visit #: [id number]  Date: 2017    TYPE OF TEST: Peripheral Venous Testing    REASON FOR TEST  Pain in limb, Limb swelling    Right Leg:-  Deep venous thrombosis:           No  Superficial venous thrombosis:    No  Deep venous insufficiency:        Not examined  Superficial venous insufficiency: Not examined    Left Leg:-  Deep venous thrombosis:           No  Superficial venous thrombosis:    No  Deep venous insufficiency:        Not examined  Superficial venous insufficiency: Not examined      INTERPRETATION/FINDINGS  Duplex images were obtained using 2-D gray scale, color flow, and  spectral Doppler analysis. Right le. No evidence of deep venous thrombosis detected in the veins  visualized. 2. Deep veins visualized include the common femoral, femoral,  popliteal, posterior tibial and peroneal veins. 3. No evidence of superficial thrombosis detected. 4. Superficial veins visualized include the proximal great saphenous  vein. Left le. No evidence of deep venous thrombosis detected in the veins  visualized. 2. Deep veins visualized include the common femoral, femoral,  popliteal, posterior tibial and peroneal veins. 3. No evidence of superficial thrombosis detected. 4. Superficial veins visualized include the proximal great saphenous  vein. ADDITIONAL COMMENTS  Limitations: Unable to position optimally due to pain. Unable to perform adequate compression analysis of the right calf  veins. Patency of these vessels is demonstrated by color flow and  Doppler signal. Cannot rule out non-occlusive thrombus in these  segments. No change when compared to previous exam on 17. I have personally reviewed the data relevant to the interpretation of  this  study.     TECHNOLOGIST: Shruti Nath RVT  Signed: 2017 03:51 PM    PHYSICIAN: Isabella Nieves MD  Signed: 04/19/2017 04:36 PM good, to achieve stated therapy goals

## 2019-10-14 ENCOUNTER — HOSPITAL ENCOUNTER (OUTPATIENT)
Dept: LAB | Age: 69
Discharge: HOME | End: 2019-10-14
Payer: COMMERCIAL

## 2019-10-14 DIAGNOSIS — I50.22 CHRONIC SYSTOLIC HEART FAILURE (HCC): ICD-10-CM

## 2019-10-14 DIAGNOSIS — I42.8 NONISCHEMIC CARDIOMYOPATHY (HCC): ICD-10-CM

## 2019-10-14 DIAGNOSIS — R60.9 EDEMA, UNSPECIFIED TYPE: ICD-10-CM

## 2019-10-14 LAB
ANION GAP SERPL CALC-SCNC: 4 MMOL/L (ref 3–18)
BUN SERPL-MCNC: 36 MG/DL (ref 7–18)
BUN/CREAT SERPL: 21 (ref 12–20)
CALCIUM SERPL-MCNC: 9.8 MG/DL (ref 8.5–10.1)
CHLORIDE SERPL-SCNC: 107 MMOL/L (ref 100–111)
CO2 SERPL-SCNC: 29 MMOL/L (ref 21–32)
CREAT SERPL-MCNC: 1.68 MG/DL (ref 0.6–1.3)
GLUCOSE SERPL-MCNC: 102 MG/DL (ref 74–99)
POTASSIUM SERPL-SCNC: 4.6 MMOL/L (ref 3.5–5.5)
SODIUM SERPL-SCNC: 140 MMOL/L (ref 136–145)

## 2019-10-14 PROCEDURE — 36415 COLL VENOUS BLD VENIPUNCTURE: CPT

## 2019-10-14 PROCEDURE — 80048 BASIC METABOLIC PNL TOTAL CA: CPT

## 2019-10-16 RX ORDER — CARVEDILOL 6.25 MG/1
6.25 TABLET ORAL 2 TIMES DAILY
Qty: 180 TAB | Refills: 3 | Status: SHIPPED | OUTPATIENT
Start: 2019-10-16 | End: 2020-01-09 | Stop reason: SDUPTHER

## 2019-10-16 RX ORDER — FUROSEMIDE 40 MG/1
40 TABLET ORAL DAILY
Qty: 180 TAB | Refills: 3 | Status: ON HOLD | OUTPATIENT
Start: 2019-10-16 | End: 2019-12-16 | Stop reason: SDUPTHER

## 2019-10-19 ENCOUNTER — TELEPHONE (OUTPATIENT)
Dept: CARDIOLOGY CLINIC | Age: 69
End: 2019-10-19

## 2019-10-19 DIAGNOSIS — I50.22 CHRONIC SYSTOLIC HEART FAILURE (HCC): Chronic | ICD-10-CM

## 2019-10-19 DIAGNOSIS — R06.02 SOB (SHORTNESS OF BREATH): Primary | ICD-10-CM

## 2019-10-19 NOTE — TELEPHONE ENCOUNTER
----- Message from Georgie Mcleod LPN sent at 43/29/5480  9:34 AM EDT ----- Regarding: lab results Please review her lastest BMP results ordered by Jose Meyers because she increased her entresto at last visit. She called to see how they looked and if her entresto dose needs to stay the same. Thanks! METABOLIC PANEL, BASIC [[id number]] (Order [id number]) Lab Date: 10/14/2019 Department: [facility] Released By: David Polanco Authorizing: Cody Oneill NP Provider Information Ordering User Authorizing Provider ROSSI Riley Dortha Daunt, NP 
PCP Patric Campbell MD 
10/14/2019  1:28 PM - José, Lab In [facility]  
 
Component Value Flag Ref Range Units Status Sodium 140   136 - 145 mmol/L Final 
Potassium 4.6   3.5 - 5.5 mmol/L Final 
Chloride 107   100 - 111 mmol/L Final 
CO2 29   21 - 32 mmol/L Final 
Anion gap 4   3.0 - 18 mmol/L Final 
Glucose 102  High   74 - 99 mg/dL Final 
BUN 36  High   7.0 - 18 MG/DL Final 
Creatinine 1.68  High   0.6 - 1.3 MG/DL Final 
BUN/Creatinine ratio 21  High   12 - 20   Final 
GFR est AA 37  Low   >60 ml/min/1.73m2 Final 
GFR est non-AA 30  Low   >60 ml/min/1.73m2 Final

## 2019-10-19 NOTE — TELEPHONE ENCOUNTER
I called to talk to the patient about her labs. He labs look OK. BUN and creatinine are about the same at 36/1.68 that they have been for several months, but BNP was down to 1684 on 8/19/2019. I actually thought that the BNP was done this time and I told her that, but actually it hasn't been done since August, so I think we should probably repeat the BMP with BNP in November just to see if BNP is getting better after over a month of high dose Entresto and to be sure renal function is remaining stable. Please let the patient know.  ES

## 2019-11-05 ENCOUNTER — TELEPHONE (OUTPATIENT)
Dept: CARDIOLOGY CLINIC | Age: 69
End: 2019-11-05

## 2019-11-05 ENCOUNTER — HOSPITAL ENCOUNTER (OUTPATIENT)
Dept: LAB | Age: 69
Discharge: HOME | End: 2019-11-05
Payer: COMMERCIAL

## 2019-11-05 DIAGNOSIS — R06.02 SOB (SHORTNESS OF BREATH): ICD-10-CM

## 2019-11-05 DIAGNOSIS — I50.22 CHRONIC SYSTOLIC HEART FAILURE (HCC): Chronic | ICD-10-CM

## 2019-11-05 LAB
ANION GAP SERPL CALC-SCNC: 6 MMOL/L (ref 3–18)
BNP SERPL-MCNC: 1617 PG/ML (ref 0–900)
BUN SERPL-MCNC: 28 MG/DL (ref 7–18)
BUN/CREAT SERPL: 20 (ref 12–20)
CALCIUM SERPL-MCNC: 9.5 MG/DL (ref 8.5–10.1)
CHLORIDE SERPL-SCNC: 104 MMOL/L (ref 100–111)
CO2 SERPL-SCNC: 31 MMOL/L (ref 21–32)
CREAT SERPL-MCNC: 1.4 MG/DL (ref 0.6–1.3)
GLUCOSE SERPL-MCNC: 100 MG/DL (ref 74–99)
POTASSIUM SERPL-SCNC: 4.4 MMOL/L (ref 3.5–5.5)
SODIUM SERPL-SCNC: 141 MMOL/L (ref 136–145)

## 2019-11-05 PROCEDURE — 83880 ASSAY OF NATRIURETIC PEPTIDE: CPT

## 2019-11-05 PROCEDURE — 80048 BASIC METABOLIC PNL TOTAL CA: CPT

## 2019-11-05 PROCEDURE — 36415 COLL VENOUS BLD VENIPUNCTURE: CPT

## 2019-11-05 NOTE — TELEPHONE ENCOUNTER
I called and discussed the results of the patient's laboratory work with her. Her renal function is mildly depressed but better than it was in the past and her NT proBNP is a little bit elevated but I think she is tolerating her high-dose Entresto 97/103 well and I would make any changes at this time.  ES

## 2019-12-12 ENCOUNTER — HOSPITAL ENCOUNTER (INPATIENT)
Age: 69
LOS: 5 days | Discharge: HOME HEALTH | DRG: 291 | End: 2019-12-17
Attending: EMERGENCY MEDICINE | Admitting: HOSPITALIST
Payer: COMMERCIAL

## 2019-12-12 ENCOUNTER — APPOINTMENT (OUTPATIENT)
Dept: GENERAL RADIOLOGY | Age: 69
DRG: 291 | End: 2019-12-12
Attending: EMERGENCY MEDICINE
Payer: COMMERCIAL

## 2019-12-12 DIAGNOSIS — E11.40 TYPE 2 DIABETES MELLITUS WITH DIABETIC NEUROPATHY, WITH LONG-TERM CURRENT USE OF INSULIN (HCC): Chronic | ICD-10-CM

## 2019-12-12 DIAGNOSIS — R77.8 ELEVATED TROPONIN: ICD-10-CM

## 2019-12-12 DIAGNOSIS — Z79.4 TYPE 2 DIABETES MELLITUS WITH DIABETIC NEUROPATHY, WITH LONG-TERM CURRENT USE OF INSULIN (HCC): Chronic | ICD-10-CM

## 2019-12-12 DIAGNOSIS — R06.00 DYSPNEA, UNSPECIFIED TYPE: Primary | ICD-10-CM

## 2019-12-12 DIAGNOSIS — R07.9 ACUTE CHEST PAIN: ICD-10-CM

## 2019-12-12 DIAGNOSIS — E87.70 HYPERVOLEMIA, UNSPECIFIED HYPERVOLEMIA TYPE: ICD-10-CM

## 2019-12-12 LAB
ALBUMIN SERPL-MCNC: 4.1 G/DL (ref 3.4–5)
ALBUMIN/GLOB SERPL: 1 {RATIO} (ref 0.8–1.7)
ALP SERPL-CCNC: 126 U/L (ref 45–117)
ALT SERPL-CCNC: 55 U/L (ref 13–56)
AMORPH CRY URNS QL MICRO: ABNORMAL
ANION GAP SERPL CALC-SCNC: 7 MMOL/L (ref 3–18)
APPEARANCE UR: CLEAR
APTT PPP: 26.1 SEC (ref 23–36.4)
AST SERPL-CCNC: 45 U/L (ref 10–38)
ATRIAL RATE: 93 BPM
BACTERIA URNS QL MICRO: ABNORMAL /HPF
BASOPHILS # BLD: 0 K/UL (ref 0–0.1)
BASOPHILS NFR BLD: 0 % (ref 0–2)
BILIRUB SERPL-MCNC: 1.2 MG/DL (ref 0.2–1)
BILIRUB UR QL: NEGATIVE
BNP SERPL-MCNC: 3357 PG/ML (ref 0–900)
BUN SERPL-MCNC: 30 MG/DL (ref 7–18)
BUN/CREAT SERPL: 20 (ref 12–20)
CALCIUM SERPL-MCNC: 10.1 MG/DL (ref 8.5–10.1)
CALCULATED P AXIS, ECG09: 72 DEGREES
CALCULATED R AXIS, ECG10: -83 DEGREES
CALCULATED T AXIS, ECG11: 7 DEGREES
CHLORIDE SERPL-SCNC: 107 MMOL/L (ref 100–111)
CK MB CFR SERPL CALC: 1.4 % (ref 0–4)
CK MB CFR SERPL CALC: 2.1 % (ref 0–4)
CK MB SERPL-MCNC: 4.4 NG/ML (ref 5–25)
CK MB SERPL-MCNC: 5.4 NG/ML (ref 5–25)
CK SERPL-CCNC: 257 U/L (ref 26–192)
CK SERPL-CCNC: 304 U/L (ref 26–192)
CO2 SERPL-SCNC: 29 MMOL/L (ref 21–32)
COLOR UR: YELLOW
CREAT SERPL-MCNC: 1.51 MG/DL (ref 0.6–1.3)
DIAGNOSIS, 93000: NORMAL
DIFFERENTIAL METHOD BLD: NORMAL
EOSINOPHIL # BLD: 0.2 K/UL (ref 0–0.4)
EOSINOPHIL NFR BLD: 2 % (ref 0–5)
EPITH CASTS URNS QL MICRO: ABNORMAL /LPF (ref 0–5)
ERYTHROCYTE [DISTWIDTH] IN BLOOD BY AUTOMATED COUNT: 13.7 % (ref 11.6–14.5)
EST. AVERAGE GLUCOSE BLD GHB EST-MCNC: 123 MG/DL
FLUAV AG NPH QL IA: NEGATIVE
FLUBV AG NOSE QL IA: NEGATIVE
GLOBULIN SER CALC-MCNC: 4 G/DL (ref 2–4)
GLUCOSE BLD STRIP.AUTO-MCNC: 126 MG/DL (ref 70–110)
GLUCOSE BLD STRIP.AUTO-MCNC: 227 MG/DL (ref 70–110)
GLUCOSE SERPL-MCNC: 126 MG/DL (ref 74–99)
GLUCOSE UR STRIP.AUTO-MCNC: NEGATIVE MG/DL
HBA1C MFR BLD: 5.9 % (ref 4.2–5.6)
HCT VFR BLD AUTO: 41.8 % (ref 35–45)
HGB BLD-MCNC: 14.7 G/DL (ref 12–16)
HGB UR QL STRIP: ABNORMAL
INR PPP: 0.9 (ref 0.8–1.2)
KETONES UR QL STRIP.AUTO: NEGATIVE MG/DL
LACTATE BLD-SCNC: 1.7 MMOL/L (ref 0.4–2)
LEUKOCYTE ESTERASE UR QL STRIP.AUTO: ABNORMAL
LYMPHOCYTES # BLD: 1.9 K/UL (ref 0.9–3.6)
LYMPHOCYTES NFR BLD: 24 % (ref 21–52)
MAGNESIUM SERPL-MCNC: 2.2 MG/DL (ref 1.6–2.6)
MCH RBC QN AUTO: 28.5 PG (ref 24–34)
MCHC RBC AUTO-ENTMCNC: 35.2 G/DL (ref 31–37)
MCV RBC AUTO: 81.2 FL (ref 74–97)
MONOCYTES # BLD: 0.6 K/UL (ref 0.05–1.2)
MONOCYTES NFR BLD: 8 % (ref 3–10)
NEUTS SEG # BLD: 5.3 K/UL (ref 1.8–8)
NEUTS SEG NFR BLD: 66 % (ref 40–73)
NITRITE UR QL STRIP.AUTO: NEGATIVE
P-R INTERVAL, ECG05: 138 MS
PH UR STRIP: 7.5 [PH] (ref 5–8)
PLATELET # BLD AUTO: 224 K/UL (ref 135–420)
PMV BLD AUTO: 10 FL (ref 9.2–11.8)
POTASSIUM SERPL-SCNC: 4.5 MMOL/L (ref 3.5–5.5)
PROT SERPL-MCNC: 8.1 G/DL (ref 6.4–8.2)
PROT UR STRIP-MCNC: 30 MG/DL
PROTHROMBIN TIME: 12.4 SEC (ref 11.5–15.2)
Q-T INTERVAL, ECG07: 460 MS
QRS DURATION, ECG06: 180 MS
QTC CALCULATION (BEZET), ECG08: 571 MS
RBC # BLD AUTO: 5.15 M/UL (ref 4.2–5.3)
RBC #/AREA URNS HPF: ABNORMAL /HPF (ref 0–5)
SODIUM SERPL-SCNC: 143 MMOL/L (ref 136–145)
SP GR UR REFRACTOMETRY: 1.01 (ref 1–1.03)
TROPONIN I SERPL-MCNC: 0.03 NG/ML (ref 0–0.04)
TROPONIN I SERPL-MCNC: 0.05 NG/ML (ref 0–0.04)
UROBILINOGEN UR QL STRIP.AUTO: 0.2 EU/DL (ref 0.2–1)
VENTRICULAR RATE, ECG03: 93 BPM
WBC # BLD AUTO: 8 K/UL (ref 4.6–13.2)
WBC URNS QL MICRO: ABNORMAL /HPF (ref 0–4)

## 2019-12-12 PROCEDURE — 81001 URINALYSIS AUTO W/SCOPE: CPT

## 2019-12-12 PROCEDURE — 85610 PROTHROMBIN TIME: CPT

## 2019-12-12 PROCEDURE — 94640 AIRWAY INHALATION TREATMENT: CPT

## 2019-12-12 PROCEDURE — 83880 ASSAY OF NATRIURETIC PEPTIDE: CPT

## 2019-12-12 PROCEDURE — 74011636637 HC RX REV CODE- 636/637: Performed by: HOSPITALIST

## 2019-12-12 PROCEDURE — 87086 URINE CULTURE/COLONY COUNT: CPT

## 2019-12-12 PROCEDURE — 87070 CULTURE OTHR SPECIMN AEROBIC: CPT

## 2019-12-12 PROCEDURE — 93005 ELECTROCARDIOGRAM TRACING: CPT

## 2019-12-12 PROCEDURE — 83036 HEMOGLOBIN GLYCOSYLATED A1C: CPT

## 2019-12-12 PROCEDURE — 83735 ASSAY OF MAGNESIUM: CPT

## 2019-12-12 PROCEDURE — 85025 COMPLETE CBC W/AUTO DIFF WBC: CPT

## 2019-12-12 PROCEDURE — 83605 ASSAY OF LACTIC ACID: CPT

## 2019-12-12 PROCEDURE — 87186 SC STD MICRODIL/AGAR DIL: CPT

## 2019-12-12 PROCEDURE — 82962 GLUCOSE BLOOD TEST: CPT

## 2019-12-12 PROCEDURE — 71045 X-RAY EXAM CHEST 1 VIEW: CPT

## 2019-12-12 PROCEDURE — 87077 CULTURE AEROBIC IDENTIFY: CPT

## 2019-12-12 PROCEDURE — 74011000250 HC RX REV CODE- 250: Performed by: EMERGENCY MEDICINE

## 2019-12-12 PROCEDURE — 77030041247 HC PROTECTOR HEEL HEELMEDIX MDII -B

## 2019-12-12 PROCEDURE — 96374 THER/PROPH/DIAG INJ IV PUSH: CPT

## 2019-12-12 PROCEDURE — 80053 COMPREHEN METABOLIC PANEL: CPT

## 2019-12-12 PROCEDURE — 82550 ASSAY OF CK (CPK): CPT

## 2019-12-12 PROCEDURE — 74011250636 HC RX REV CODE- 250/636: Performed by: EMERGENCY MEDICINE

## 2019-12-12 PROCEDURE — 77030037878 HC DRSG MEPILEX >48IN BORD MOLN -B

## 2019-12-12 PROCEDURE — 87040 BLOOD CULTURE FOR BACTERIA: CPT

## 2019-12-12 PROCEDURE — 87804 INFLUENZA ASSAY W/OPTIC: CPT

## 2019-12-12 PROCEDURE — 85730 THROMBOPLASTIN TIME PARTIAL: CPT

## 2019-12-12 PROCEDURE — 65660000000 HC RM CCU STEPDOWN

## 2019-12-12 RX ORDER — SODIUM CHLORIDE 0.9 % (FLUSH) 0.9 %
5-40 SYRINGE (ML) INJECTION AS NEEDED
Status: DISCONTINUED | OUTPATIENT
Start: 2019-12-12 | End: 2019-12-17 | Stop reason: HOSPADM

## 2019-12-12 RX ORDER — DEXTROSE 50 % IN WATER (D50W) INTRAVENOUS SYRINGE
25-50 AS NEEDED
Status: DISCONTINUED | OUTPATIENT
Start: 2019-12-12 | End: 2019-12-13

## 2019-12-12 RX ORDER — ONDANSETRON 2 MG/ML
4 INJECTION INTRAMUSCULAR; INTRAVENOUS
Status: DISCONTINUED | OUTPATIENT
Start: 2019-12-12 | End: 2019-12-17 | Stop reason: HOSPADM

## 2019-12-12 RX ORDER — DIPHENHYDRAMINE HYDROCHLORIDE 50 MG/ML
25 INJECTION, SOLUTION INTRAMUSCULAR; INTRAVENOUS
Status: DISCONTINUED | OUTPATIENT
Start: 2019-12-12 | End: 2019-12-17 | Stop reason: HOSPADM

## 2019-12-12 RX ORDER — CYCLOBENZAPRINE HCL 10 MG
10 TABLET ORAL
Status: ON HOLD | COMMUNITY
End: 2019-12-12

## 2019-12-12 RX ORDER — GABAPENTIN 300 MG/1
600 CAPSULE ORAL 2 TIMES DAILY
Status: DISCONTINUED | OUTPATIENT
Start: 2019-12-13 | End: 2019-12-14

## 2019-12-12 RX ORDER — THYROID, PORCINE 30 MG/1
1 TABLET ORAL
Refills: 0 | COMMUNITY
Start: 2019-11-14 | End: 2020-01-07 | Stop reason: ALTCHOICE

## 2019-12-12 RX ORDER — FUROSEMIDE 10 MG/ML
40 INJECTION INTRAMUSCULAR; INTRAVENOUS ONCE
Status: COMPLETED | OUTPATIENT
Start: 2019-12-13 | End: 2019-12-13

## 2019-12-12 RX ORDER — CARVEDILOL 6.25 MG/1
6.25 TABLET ORAL 2 TIMES DAILY
Status: DISCONTINUED | OUTPATIENT
Start: 2019-12-13 | End: 2019-12-17 | Stop reason: HOSPADM

## 2019-12-12 RX ORDER — SODIUM CHLORIDE 0.9 % (FLUSH) 0.9 %
5-40 SYRINGE (ML) INJECTION EVERY 8 HOURS
Status: DISCONTINUED | OUTPATIENT
Start: 2019-12-13 | End: 2019-12-17 | Stop reason: HOSPADM

## 2019-12-12 RX ORDER — LEVOTHYROXINE AND LIOTHYRONINE 19; 4.5 UG/1; UG/1
30 TABLET ORAL
Status: DISCONTINUED | OUTPATIENT
Start: 2019-12-13 | End: 2019-12-17 | Stop reason: HOSPADM

## 2019-12-12 RX ORDER — DOCUSATE SODIUM 100 MG/1
100 CAPSULE, LIQUID FILLED ORAL 2 TIMES DAILY
Status: DISCONTINUED | OUTPATIENT
Start: 2019-12-13 | End: 2019-12-17 | Stop reason: HOSPADM

## 2019-12-12 RX ORDER — FUROSEMIDE 10 MG/ML
40 INJECTION INTRAMUSCULAR; INTRAVENOUS
Status: COMPLETED | OUTPATIENT
Start: 2019-12-12 | End: 2019-12-12

## 2019-12-12 RX ORDER — IPRATROPIUM BROMIDE AND ALBUTEROL SULFATE 2.5; .5 MG/3ML; MG/3ML
3 SOLUTION RESPIRATORY (INHALATION) ONCE
Status: COMPLETED | OUTPATIENT
Start: 2019-12-12 | End: 2019-12-12

## 2019-12-12 RX ORDER — INSULIN LISPRO 100 [IU]/ML
INJECTION, SOLUTION INTRAVENOUS; SUBCUTANEOUS
Status: DISCONTINUED | OUTPATIENT
Start: 2019-12-13 | End: 2019-12-17 | Stop reason: HOSPADM

## 2019-12-12 RX ORDER — HEPARIN SODIUM 5000 [USP'U]/ML
5000 INJECTION, SOLUTION INTRAVENOUS; SUBCUTANEOUS EVERY 8 HOURS
Status: DISCONTINUED | OUTPATIENT
Start: 2019-12-13 | End: 2019-12-17 | Stop reason: HOSPADM

## 2019-12-12 RX ORDER — SULFAMETHOXAZOLE AND TRIMETHOPRIM 800; 160 MG/1; MG/1
1 TABLET ORAL
Refills: 1 | Status: ON HOLD | COMMUNITY
Start: 2019-12-14 | End: 2019-12-16

## 2019-12-12 RX ORDER — INSULIN GLARGINE 100 [IU]/ML
10 INJECTION, SOLUTION SUBCUTANEOUS
Status: DISCONTINUED | OUTPATIENT
Start: 2019-12-12 | End: 2019-12-17 | Stop reason: HOSPADM

## 2019-12-12 RX ADMIN — INSULIN GLARGINE 10 UNITS: 100 INJECTION, SOLUTION SUBCUTANEOUS at 23:15

## 2019-12-12 RX ADMIN — IPRATROPIUM BROMIDE AND ALBUTEROL SULFATE 3 ML: .5; 3 SOLUTION RESPIRATORY (INHALATION) at 18:23

## 2019-12-12 RX ADMIN — FUROSEMIDE 40 MG: 10 INJECTION, SOLUTION INTRAMUSCULAR; INTRAVENOUS at 18:23

## 2019-12-12 NOTE — ED PROVIDER NOTES
EMERGENCY DEPARTMENT HISTORY AND PHYSICAL EXAM    4:19 PM      Date: 12/12/2019  Patient Name: Sanchez Marr    History of Presenting Illness     Chief Complaint   Patient presents with    Shortness of Breath    Anxiety         History Provided By: Patient  Location/Duration/Severity/Modifying factors   Patient is a 69-year-old female with a history of paraplegia status post infection in her lumbar spine, hypertension, diabetes, nonischemic cardiomyopathy, chronic right heel ulcer, Krueger catheter due to skin breakdown, presents emergency department with complaint of 4 days of increasing dyspnea with movement as well as orthopnea. Patient know she needs to sit up more at night to sleep and has been having increased weight gain. Patient and daughter have noticed that her lower extremities been more swollen in her sports bras fitting tighter. Patient uses a motorized scooter and went today to get her suprapubic catheter changed and had to be changed to a smaller size catheter and there was some difficulty placing it. After that procedure the patient was noting she was having some shortness of breath decided to go to her primary doctor and was sent to the emergency department for further evaluation due to increasing shortness of breath. Patient denies productive cough or any fevers however patient daughter notes that when she has these episodes she either has worsening congestive heart failure or she has an infection. Patient has a right heel ulcer that is being cared for well at home and is dressed daily. Patient denies any diarrhea, vomiting, or nausea. Patient has been having a nonproductive cough for the last 4 days. There are several sick contacts at home. Patient is not a smoker or drinker. There are no other aggravating or alleviating factors.           PCP: Carlos Santiago MD    Current Outpatient Medications   Medication Sig Dispense Refill    carvedilol (COREG) 6.25 mg tablet Take 1 Tab by mouth two (2) times a day. 180 Tab 3    sacubitril-valsartan (ENTRESTO) 97 mg/103 mg tablet Take 1 Tab by mouth two (2) times a day. 180 Tab 3    furosemide (LASIX) 40 mg tablet Take 1 Tab by mouth daily. 180 Tab 3    exenatide microspheres (BYDUREON) 2 mg serr 2 mg by SubCUTAneous route every seven (7) days.  multivitamin (ONE A DAY) tablet Take 1 Tab by mouth daily.  diphenhydrAMINE (BENADRYL) 25 mg capsule TAKE 25MG NIGHT PRIOR TO PROCEDURE, AND TAKE 25MG 1 HOUR PRIOR TO PROCEDURE. (Patient taking differently: 25 mg every six (6) hours as needed. TAKE 25MG NIGHT PRIOR TO PROCEDURE, AND TAKE 25MG 1 HOUR PRIOR TO PROCEDURE.) 2 Cap 0    fluticasone (FLONASE) 50 mcg/actuation nasal spray 1 spray each nostril BID (Patient taking differently: daily as needed. 1 spray each nostril BID) 1 Bottle 0    metFORMIN ER (GLUCOPHAGE XR) 500 mg tablet Take 500 mg by mouth daily.  cholecalciferol, VITAMIN D3, (VITAMIN D3) 5,000 unit tab tablet Take  by mouth daily.  gabapentin (NEURONTIN) 300 mg capsule Take 2 Caps by mouth two (2) times a day. Indications: NEUROPATHIC PAIN 100 Cap 0    insulin lispro (HUMALOG) 100 unit/mL injection See sliding scale (Patient taking differently: 4 Units Before breakfast, lunch, and dinner. See sliding scale) 1 Vial 0    folic acid (FOLVITE) 1 mg tablet Take 1 Tab by mouth daily. 30 Tab 0    insulin glargine (LANTUS) 100 unit/mL injection 5 Units by SubCUTAneous route nightly. Indications: type 2 diabetes mellitus (Patient taking differently: 10 Units by SubCUTAneous route nightly.) 1 Vial 0       Past History     Past Medical History:  Past Medical History:   Diagnosis Date    Acute paraplegia ([facility] [address] 4/20/2017    Benign hypertensive heart disease with systolic CHF, NYHA class 2 ([address] 9/5/2012    Biventricular implantable cardioverter-defibrillator in situ 04/28/2005    Upgraded to BiV AICD; gen change 4/2008; pocket revision 10/2009;  Abdominal - done on 8/22/2012 by  [id number] Cardiac cath 08/15/1996    Patent coronaries. Elev LVEDP. EF 50-55%.  Cardiac echocardiogram 06/23/2015    Ltd study. EF 45-50%. Mild, diffuse hypk. Severe apical hypk. No mass or thrombus was clearly identified, although imaging was suboptimal.      Cardiac nuclear imaging test 06/19/2015    Fixed distal apical, distal septal defect more likely due to RV pacing than prior infarct. No ischemia. EF 46%. RWMA c/w RV pacing. Nondiagnostic EKG on pharm stress test.      Cardiovascular lower extremity venous duplex 09/04/2012    Acute, non-occlusive DVT in CFV on right. No DVT on left. No superficial thrombosis bilaterally.  Cardiovascular upper extremity venous duplex 08/27/2012    DVT in axillary vein on left. Left subclavian was not visualized.     Chronic anemia 9/5/2012    Chronic systolic heart failure (HCC)     Decreased calculated glomerular filtration rate (GFR) 3/30/2017    Calculated GFR equivalent to that of CKD stage 3 = 30-59 ml/min    Diabetic neuropathy associated with type 2 diabetes mellitus ([address] 6/28/2011    Difficult airway for intubation 08/22/2012    see anesthesia airway note    Dyslipidemia 6/28/2011    Gout     History of complete heart block 6/28/2011    History of Coumadin therapy     Anticoagulation for DVT of the LUE; Discontinued on 3/30/2017    History of deep venous thrombosis 9/5/2012    Left upper extremity    History of pyelonephritis 3/30/2017    Left bundle branch block (LBBB) on electrocardiogram 6/28/2011    Nonischemic cardiomyopathy ([address] 6/28/2011    Obesity (BMI 35.0-39.9 without comorbidity) 3/13/2017    Obstructive sleep apnea on CPAP 2/7/2012    Psoas abscess, right ([address] 4/20/2017    Psoas hematoma, right, secondary to anticoagulant therapy 3/30/2017    Type 2 diabetes mellitus with diabetic neuropathy ([address] 6/28/2011       Past Surgical History:  Past Surgical History:   Procedure Laterality Date    HX CARPAL TUNNEL RELEASE  4/07    right     HX CHOLECYSTECTOMY  1994    HX HYSTERECTOMY  1973    HX OTHER SURGICAL  6/11/2012    AICD revision    HX PACEMAKER  4/28/2005    [employer] AICD    ID REMVL PERM PM PLS GEN W/REPL PLSE GEN 2 LEAD SYS N/A 2/26/2019    REMOVE & REPLACE PPM GEN DUAL LEAD performed by Siobhan Santana MD at [facility]       Family History:  Family History   Problem Relation Age of Onset    Cancer Father         Leukemia       Social History:  Social History     Tobacco Use    Smoking status: Never Smoker    Smokeless tobacco: Never Used   Substance Use Topics    Alcohol use: Not Currently    Drug use: No       Allergies: Allergies   Allergen Reactions    Latex Itching    Vancomycin Itching    Ampicillin Itching    Bactrim [Sulfamethoxazole-Trimethoprim] Itching    Blueberry Swelling     Causes throat swelling    Ciprofloxacin Itching    Codeine Other (comments)     Jumpy feeling    Crestor [Rosuvastatin] Itching    Darvocet A500 [Propoxyphene N-Acetaminophen] Itching    Demerol [Meperidine] Itching    Levaquin [Levofloxacin] Itching    Lipitor [Atorvastatin] Myalgia    Magnesium Oxide Itching     nausea    Minocin [Minocycline] Itching    Pcn [Penicillins] Itching    Pravachol [Pravastatin] Swelling     Swelling in mouth. Not allergic per patient, takes at home    Shellfish Derived Swelling     itching    Sulfa (Sulfonamide Antibiotics) Itching    Ultracet [Tramadol-Acetaminophen] Itching    Vicodin [Hydrocodone-Acetaminophen] Itching    Vytorin 10-10 [Ezetimibe-Simvastatin] Myalgia    Percodan [Oxycodone Hcl-Oxycodone-Asa] Itching         Review of Systems       Review of Systems   Constitutional: Positive for activity change. Negative for fatigue and fever. HENT: Positive for congestion. Negative for rhinorrhea. Eyes: Negative for visual disturbance. Respiratory: Positive for chest tightness and shortness of breath.     Cardiovascular: Positive for leg swelling. Negative for chest pain and palpitations. Gastrointestinal: Positive for abdominal pain. Negative for diarrhea, nausea and vomiting. Genitourinary: Negative for dysuria and hematuria. Musculoskeletal: Negative for back pain. Skin: Negative for rash. Neurological: Positive for weakness. Negative for dizziness and light-headedness. All other systems reviewed and are negative. Physical Exam     Visit Vitals  LMP  (LMP Unknown)         Physical Exam  Vitals signs and nursing note reviewed. Constitutional:       General: She is not in acute distress. Appearance: She is well-developed. HENT:      Head: Normocephalic and atraumatic. Right Ear: External ear normal.      Left Ear: External ear normal.      Nose: Nose normal.   Eyes:      General: No scleral icterus. Conjunctiva/sclera: Conjunctivae normal.      Pupils: Pupils are equal, round, and reactive to light. Neck:      Musculoskeletal: Normal range of motion and neck supple. Thyroid: No thyromegaly. Vascular: No JVD. Trachea: No tracheal deviation. Cardiovascular:      Rate and Rhythm: Normal rate and regular rhythm. Heart sounds: Normal heart sounds. No murmur. No friction rub. No gallop. Pulmonary:      Effort: Pulmonary effort is normal.      Breath sounds: Examination of the right-lower field reveals rhonchi and rales. Examination of the left-lower field reveals rhonchi and rales. Rhonchi and rales present. Chest:      Chest wall: No tenderness. Abdominal:      General: Bowel sounds are normal. There is no distension. Palpations: Abdomen is soft. Tenderness: There is no tenderness. There is no guarding or rebound. Musculoskeletal: Normal range of motion. General: No tenderness. Right lower leg: Edema present. Left lower leg: Edema present.       Comments: Bilateral pitting edema right greater than left, right ankle with a healing dry ulceration without active drainage   Lymphadenopathy:      Cervical: No cervical adenopathy. Skin:     General: Skin is warm and dry. Capillary Refill: Capillary refill takes less than 2 seconds. Neurological:      Mental Status: She is alert and oriented to person, place, and time. Cranial Nerves: No cranial nerve deficit. Coordination: Coordination normal.      Comments: No sensory loss, Gait normal, Motor 5/5   Psychiatric:         Behavior: Behavior normal.         Thought Content: Thought content normal.         Judgment: Judgment normal.      Comments: Supportive daughter at the bedside           Diagnostic Study Results     Labs -  Recent Results (from the past 12 hour(s))   AMB POC URINALYSIS DIP STICK AUTO W/O MICRO    Collection Time: 12/12/19 12:40 PM   Result Value Ref Range    Color (UA POC) Yellow     Clarity (UA POC) Clear     Glucose (UA POC) 1+ Negative    Bilirubin (UA POC) Negative Negative    Ketones (UA POC) Negative Negative    Specific gravity (UA POC) 1.020 1.001 - 1.035    Blood (UA POC) 3+ Negative    pH (UA POC) 7.0 4.6 - 8.0    Protein (UA POC) 2+ Negative    Urobilinogen (UA POC) 0.2 mg/dL 0.2 - 1    Nitrites (UA POC) Negative Negative    Leukocyte esterase (UA POC) Trace Negative   EKG, 12 LEAD, INITIAL    Collection Time: 12/12/19  2:39 PM   Result Value Ref Range    Ventricular Rate 93 BPM    Atrial Rate 93 BPM    P-R Interval 138 ms    QRS Duration 180 ms    Q-T Interval 460 ms    QTC Calculation (Bezet) 571 ms    Calculated P Axis 72 degrees    Calculated R Axis -83 degrees    Calculated T Axis 7 degrees    Diagnosis       Atrial-sensed ventricular-paced rhythm  Abnormal ECG  When compared with ECG of 04-SEP-2019 10:16,  Vent.  rate has increased BY  20 BPM  Confirmed by Seferino Olivia MD, ----- ([id number]) on 12/12/2019 4:36:02 PM     CBC WITH AUTOMATED DIFF    Collection Time: 12/12/19  4:02 PM   Result Value Ref Range    WBC 8.0 4.6 - 13.2 K/uL    RBC 5.15 4.20 - 5.30 M/uL    HGB 14.7 12.0 - 16.0 g/dL    HCT 41.8 35.0 - 45.0 %    MCV 81.2 74.0 - 97.0 FL    MCH 28.5 24.0 - 34.0 PG    MCHC 35.2 31.0 - 37.0 g/dL    RDW 13.7 11.6 - 14.5 %    PLATELET 171 035 - 250 K/uL    MPV 10.0 9.2 - 11.8 FL    NEUTROPHILS 66 40 - 73 %    LYMPHOCYTES 24 21 - 52 %    MONOCYTES 8 3 - 10 %    EOSINOPHILS 2 0 - 5 %    BASOPHILS 0 0 - 2 %    ABS. NEUTROPHILS 5.3 1.8 - 8.0 K/UL    ABS. LYMPHOCYTES 1.9 0.9 - 3.6 K/UL    ABS. MONOCYTES 0.6 0.05 - 1.2 K/UL    ABS. EOSINOPHILS 0.2 0.0 - 0.4 K/UL    ABS. BASOPHILS 0.0 0.0 - 0.1 K/UL    DF AUTOMATED     METABOLIC PANEL, COMPREHENSIVE    Collection Time: 12/12/19  4:02 PM   Result Value Ref Range    Sodium 143 136 - 145 mmol/L    Potassium 4.5 3.5 - 5.5 mmol/L    Chloride 107 100 - 111 mmol/L    CO2 29 21 - 32 mmol/L    Anion gap 7 3.0 - 18 mmol/L    Glucose 126 (H) 74 - 99 mg/dL    BUN 30 (H) 7.0 - 18 MG/DL    Creatinine 1.51 (H) 0.6 - 1.3 MG/DL    BUN/Creatinine ratio 20 12 - 20      GFR est AA 41 (L) >60 ml/min/1.73m2    GFR est non-AA 34 (L) >60 ml/min/1.73m2    Calcium 10.1 8.5 - 10.1 MG/DL    Bilirubin, total 1.2 (H) 0.2 - 1.0 MG/DL    ALT (SGPT) 55 13 - 56 U/L    AST (SGOT) 45 (H) 10 - 38 U/L    Alk.  phosphatase 126 (H) 45 - 117 U/L    Protein, total 8.1 6.4 - 8.2 g/dL    Albumin 4.1 3.4 - 5.0 g/dL    Globulin 4.0 2.0 - 4.0 g/dL    A-G Ratio 1.0 0.8 - 1.7     MAGNESIUM    Collection Time: 12/12/19  4:02 PM   Result Value Ref Range    Magnesium 2.2 1.6 - 2.6 mg/dL   NT-PRO BNP    Collection Time: 12/12/19  4:02 PM   Result Value Ref Range    NT pro-BNP 3,357 (H) 0 - 900 PG/ML   CARDIAC PANEL,(CK, CKMB & TROPONIN)    Collection Time: 12/12/19  4:02 PM   Result Value Ref Range     (H) 26 - 192 U/L    CK - MB 5.4 (H) <3.6 ng/ml    CK-MB Index 2.1 0.0 - 4.0 %    Troponin-I, QT 0.03 0.0 - 0.045 NG/ML   PROTHROMBIN TIME + INR    Collection Time: 12/12/19  4:02 PM   Result Value Ref Range    Prothrombin time 12.4 11.5 - 15.2 sec    INR 0.9 0.8 - 1.2     PTT    Collection Time: 12/12/19  4:02 PM   Result Value Ref Range    aPTT 26.1 23.0 - 36.4 SEC   URINALYSIS W/ RFLX MICROSCOPIC    Collection Time: 12/12/19  4:02 PM   Result Value Ref Range    Color YELLOW      Appearance CLEAR      Specific gravity 1.006 1.005 - 1.030      pH (UA) 7.5 5.0 - 8.0      Protein 30 (A) NEG mg/dL    Glucose NEGATIVE  NEG mg/dL    Ketone NEGATIVE  NEG mg/dL    Bilirubin NEGATIVE  NEG      Blood SMALL (A) NEG      Urobilinogen 0.2 0.2 - 1.0 EU/dL    Nitrites NEGATIVE  NEG      Leukocyte Esterase TRACE (A) NEG     URINE MICROSCOPIC ONLY    Collection Time: 12/12/19  4:02 PM   Result Value Ref Range    WBC 0 to 2 0 - 4 /hpf    RBC 3 to 10 0 - 5 /hpf    Epithelial cells FEW 0 - 5 /lpf    Bacteria FEW (A) NEG /hpf    Amorphous Crystals FEW (A) NEG     POC LACTIC ACID    Collection Time: 12/12/19  4:13 PM   Result Value Ref Range    Lactic Acid (POC) 1.70 0.40 - 2.00 mmol/L   CARDIAC PANEL,(CK, CKMB & TROPONIN)    Collection Time: 12/12/19  6:16 PM   Result Value Ref Range     (H) 26 - 192 U/L    CK - MB 4.4 (H) <3.6 ng/ml    CK-MB Index 1.4 0.0 - 4.0 %    Troponin-I, QT 0.05 (H) 0.0 - 0.045 NG/ML   INFLUENZA A & B AG (RAPID TEST)    Collection Time: 12/12/19  6:20 PM   Result Value Ref Range    Influenza A Antigen NEGATIVE  NEG      Influenza B Antigen NEGATIVE  NEG     GLUCOSE, POC    Collection Time: 12/12/19  6:27 PM   Result Value Ref Range    Glucose (POC) 126 (H) 70 - 110 mg/dL       Radiologic Studies -   XR CHEST SNGL V   Final Result   IMPRESSION: No evidence of acute cardiopulmonary disease. Mild cardiomegaly. No   significant interval changes. Medical Decision Making   I am the first provider for this patient. I reviewed the vital signs, available nursing notes, past medical history, past surgical history, family history and social history. Vital Signs-Reviewed the patient's vital signs.       EKG: Paced and interpreted by me    Records Reviewed: Nursing Notes and Old Medical Records (Time of Review: 4:19 PM)    ED Course: Progress Notes, Reevaluation, and Consults:     Patient has diuresed however continues to have shortness of breath with little bit of movement especially with lying flat. Patient's troponin is mildly trending upwards and given the poor improvement in the rising troponin will discuss admission with the hospitalist.Blaze Thayer, DO 7:49 PM    I discussed the rising troponin with the hospitalist and persistent shortness of breath and will admit the patient for further evaluation of congestive heart failure and heart disease. 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 Patient is having some anasarca-like symptoms with the tightening of her close as well as extremity swelling. Patient has orthopnea as well. Patient is on Lasix 60 mg daily and continues to diurese on that medication. Suspect patient has multifactorial dyspnea and will evaluate for infectious causes of her shortness of breath as well as volume overload. Will follow lactic acid, blood cultures, chest x-ray, cardiac labs, BNP, and then reevaluate. Patient's right foot does not appear to be acutely infected. Sivakumar Sensing, DO 4:26 PM        Procedures    Critical Care Time: Critical Care Time:  The services I provided to this patient were to treat and/or prevent clinically significant deterioration that could result in the failure of one or more body systems and/or organ systems due to volume overload.     Services included the following:  -reviewing nursing notes and old charts  -vital sign assessments  -direct patient care  -medication orders and management  -interpreting and reviewing diagnostic studies/labs  -re-evaluations  -documentation time    Aggregate critical care time was 36 minutes, which includes only time during which I was engaged in work directly related to the patient's care as described above, whether I was at bedside or elsewhere in the Emergency Department. It did not include time spent performing other reported procedures or the services of residents, students, nurses, or advance practice providers. Annie Walker DO 8:49 PM        Diagnosis     Clinical Impression:   1. Dyspnea, unspecified type    2. Hypervolemia, unspecified hypervolemia type    3. Elevated troponin    4. Acute chest pain        Disposition: Admit    Follow-up Information    None          Patient's Medications   Start Taking    No medications on file   Continue Taking    CARVEDILOL (COREG) 6.25 MG TABLET    Take 1 Tab by mouth two (2) times a day. CHOLECALCIFEROL, VITAMIN D3, (VITAMIN D3) 5,000 UNIT TAB TABLET    Take  by mouth daily. DIPHENHYDRAMINE (BENADRYL) 25 MG CAPSULE    TAKE 25MG NIGHT PRIOR TO PROCEDURE, AND TAKE 25MG 1 HOUR PRIOR TO PROCEDURE. EXENATIDE MICROSPHERES (BYDUREON) 2 MG SERR    2 mg by SubCUTAneous route every seven (7) days. FLUTICASONE (FLONASE) 50 MCG/ACTUATION NASAL SPRAY    1 spray each nostril BID    FOLIC ACID (FOLVITE) 1 MG TABLET    Take 1 Tab by mouth daily. FUROSEMIDE (LASIX) 40 MG TABLET    Take 1 Tab by mouth daily. GABAPENTIN (NEURONTIN) 300 MG CAPSULE    Take 2 Caps by mouth two (2) times a day. Indications: NEUROPATHIC PAIN    INSULIN GLARGINE (LANTUS) 100 UNIT/ML INJECTION    5 Units by SubCUTAneous route nightly.  Indications: type 2 diabetes mellitus    INSULIN LISPRO (HUMALOG) 100 UNIT/ML INJECTION    See sliding scale    METFORMIN ER (GLUCOPHAGE XR) 500 MG TABLET    Take 500 mg by mouth daily. MULTIVITAMIN (ONE A DAY) TABLET    Take 1 Tab by mouth daily. SACUBITRIL-VALSARTAN (ENTRESTO) 97 MG/103 MG TABLET    Take 1 Tab by mouth two (2) times a day. These Medications have changed    No medications on file   Stop Taking    No medications on file     Disclaimer: Sections of this note are dictated using utilizing voice recognition software. Minor typographical errors may be present. If questions arise, please do not hesitate to contact me or call our department.

## 2019-12-12 NOTE — ED TRIAGE NOTES
Per EMS, Patient was Dr. Luzma De Jesus office for difficulty breathing and palpitations. Extensive hx, meds and allergies. Patient end tidal was 20 and we did talk down techniques. VS signs were stable.

## 2019-12-13 ENCOUNTER — APPOINTMENT (OUTPATIENT)
Dept: NON INVASIVE DIAGNOSTICS | Age: 69
DRG: 291 | End: 2019-12-13
Attending: PHYSICIAN ASSISTANT
Payer: COMMERCIAL

## 2019-12-13 PROBLEM — I50.23 SYSTOLIC CHF, ACUTE ON CHRONIC (HCC): Status: ACTIVE | Noted: 2019-12-13

## 2019-12-13 LAB
ALBUMIN SERPL-MCNC: 3.3 G/DL (ref 3.4–5)
ALBUMIN/GLOB SERPL: 0.9 {RATIO} (ref 0.8–1.7)
ALP SERPL-CCNC: 105 U/L (ref 45–117)
ALT SERPL-CCNC: 40 U/L (ref 13–56)
ANION GAP SERPL CALC-SCNC: 6 MMOL/L (ref 3–18)
AST SERPL-CCNC: 32 U/L (ref 10–38)
ATRIAL RATE: 74 BPM
BASOPHILS # BLD: 0 K/UL (ref 0–0.1)
BASOPHILS NFR BLD: 1 % (ref 0–2)
BILIRUB DIRECT SERPL-MCNC: 0.3 MG/DL (ref 0–0.2)
BILIRUB SERPL-MCNC: 1.1 MG/DL (ref 0.2–1)
BUN SERPL-MCNC: 35 MG/DL (ref 7–18)
BUN/CREAT SERPL: 21 (ref 12–20)
CALCIUM SERPL-MCNC: 9.4 MG/DL (ref 8.5–10.1)
CALCULATED P AXIS, ECG09: 61 DEGREES
CALCULATED R AXIS, ECG10: 98 DEGREES
CALCULATED T AXIS, ECG11: 156 DEGREES
CHLORIDE SERPL-SCNC: 106 MMOL/L (ref 100–111)
CK MB CFR SERPL CALC: 1.7 % (ref 0–4)
CK MB CFR SERPL CALC: 2 % (ref 0–4)
CK MB SERPL-MCNC: 4.5 NG/ML (ref 5–25)
CK MB SERPL-MCNC: 4.9 NG/ML (ref 5–25)
CK SERPL-CCNC: 227 U/L (ref 26–192)
CK SERPL-CCNC: 286 U/L (ref 26–192)
CO2 SERPL-SCNC: 29 MMOL/L (ref 21–32)
CREAT SERPL-MCNC: 1.68 MG/DL (ref 0.6–1.3)
DIAGNOSIS, 93000: NORMAL
DIFFERENTIAL METHOD BLD: NORMAL
ECHO LV EDV TEICHHOLZ: 0.75 ML
ECHO LV ESV TEICHHOLZ: 0.54 ML
ECHO LV INTERNAL DIMENSION DIASTOLIC: 5.16 CM (ref 3.9–5.3)
ECHO LV INTERNAL DIMENSION SYSTOLIC: 4.48 CM
ECHO LV IVSD: 0.8 CM (ref 0.6–0.9)
ECHO LV MASS 2D: 162.8 G (ref 67–162)
ECHO LV MASS INDEX 2D: 74.8 G/M2 (ref 43–95)
ECHO LV POSTERIOR WALL DIASTOLIC: 0.78 CM (ref 0.6–0.9)
ECHO TV REGURGITANT MAX VELOCITY: 215.01 CM/S
ECHO TV REGURGITANT PEAK GRADIENT: 18.5 MMHG
EOSINOPHIL # BLD: 0.1 K/UL (ref 0–0.4)
EOSINOPHIL NFR BLD: 2 % (ref 0–5)
ERYTHROCYTE [DISTWIDTH] IN BLOOD BY AUTOMATED COUNT: 14.2 % (ref 11.6–14.5)
GLOBULIN SER CALC-MCNC: 3.7 G/DL (ref 2–4)
GLUCOSE BLD STRIP.AUTO-MCNC: 141 MG/DL (ref 70–110)
GLUCOSE BLD STRIP.AUTO-MCNC: 142 MG/DL (ref 70–110)
GLUCOSE BLD STRIP.AUTO-MCNC: 144 MG/DL (ref 70–110)
GLUCOSE BLD STRIP.AUTO-MCNC: 167 MG/DL (ref 70–110)
GLUCOSE SERPL-MCNC: 109 MG/DL (ref 74–99)
HCT VFR BLD AUTO: 35.9 % (ref 35–45)
HGB BLD-MCNC: 12.4 G/DL (ref 12–16)
LVFS 2D: 13.17 %
LVSV (TEICH): 15.81 ML
LYMPHOCYTES # BLD: 2.2 K/UL (ref 0.9–3.6)
LYMPHOCYTES NFR BLD: 35 % (ref 21–52)
MAGNESIUM SERPL-MCNC: 2.2 MG/DL (ref 1.6–2.6)
MCH RBC QN AUTO: 28.2 PG (ref 24–34)
MCHC RBC AUTO-ENTMCNC: 34.5 G/DL (ref 31–37)
MCV RBC AUTO: 81.6 FL (ref 74–97)
MONOCYTES # BLD: 0.6 K/UL (ref 0.05–1.2)
MONOCYTES NFR BLD: 10 % (ref 3–10)
NEUTS SEG # BLD: 3.3 K/UL (ref 1.8–8)
NEUTS SEG NFR BLD: 52 % (ref 40–73)
P-R INTERVAL, ECG05: 142 MS
PHOSPHATE SERPL-MCNC: 4.1 MG/DL (ref 2.5–4.9)
PLATELET # BLD AUTO: 178 K/UL (ref 135–420)
PMV BLD AUTO: 10.1 FL (ref 9.2–11.8)
POTASSIUM SERPL-SCNC: 3.9 MMOL/L (ref 3.5–5.5)
PROT SERPL-MCNC: 7 G/DL (ref 6.4–8.2)
Q-T INTERVAL, ECG07: 520 MS
QRS DURATION, ECG06: 184 MS
QTC CALCULATION (BEZET), ECG08: 577 MS
RBC # BLD AUTO: 4.4 M/UL (ref 4.2–5.3)
SODIUM SERPL-SCNC: 141 MMOL/L (ref 136–145)
TROPONIN I SERPL-MCNC: 0.04 NG/ML (ref 0–0.04)
TROPONIN I SERPL-MCNC: 0.05 NG/ML (ref 0–0.04)
VENTRICULAR RATE, ECG03: 74 BPM
WBC # BLD AUTO: 6.2 K/UL (ref 4.6–13.2)

## 2019-12-13 PROCEDURE — 80076 HEPATIC FUNCTION PANEL: CPT

## 2019-12-13 PROCEDURE — 36415 COLL VENOUS BLD VENIPUNCTURE: CPT

## 2019-12-13 PROCEDURE — 77010033678 HC OXYGEN DAILY

## 2019-12-13 PROCEDURE — C8923 2D TTE W OR W/O FOL W/CON,CO: HCPCS

## 2019-12-13 PROCEDURE — 84100 ASSAY OF PHOSPHORUS: CPT

## 2019-12-13 PROCEDURE — 94760 N-INVAS EAR/PLS OXIMETRY 1: CPT

## 2019-12-13 PROCEDURE — 82962 GLUCOSE BLOOD TEST: CPT

## 2019-12-13 PROCEDURE — 83735 ASSAY OF MAGNESIUM: CPT

## 2019-12-13 PROCEDURE — 85025 COMPLETE CBC W/AUTO DIFF WBC: CPT

## 2019-12-13 PROCEDURE — 74011250637 HC RX REV CODE- 250/637: Performed by: FAMILY MEDICINE

## 2019-12-13 PROCEDURE — 74011250636 HC RX REV CODE- 250/636: Performed by: FAMILY MEDICINE

## 2019-12-13 PROCEDURE — 65660000000 HC RM CCU STEPDOWN

## 2019-12-13 PROCEDURE — 74011250636 HC RX REV CODE- 250/636: Performed by: PHYSICIAN ASSISTANT

## 2019-12-13 PROCEDURE — 93005 ELECTROCARDIOGRAM TRACING: CPT

## 2019-12-13 PROCEDURE — 82550 ASSAY OF CK (CPK): CPT

## 2019-12-13 PROCEDURE — 74011250636 HC RX REV CODE- 250/636: Performed by: HOSPITALIST

## 2019-12-13 PROCEDURE — 74011636637 HC RX REV CODE- 636/637: Performed by: HOSPITALIST

## 2019-12-13 PROCEDURE — 74011250637 HC RX REV CODE- 250/637: Performed by: HOSPITALIST

## 2019-12-13 PROCEDURE — 80048 BASIC METABOLIC PNL TOTAL CA: CPT

## 2019-12-13 RX ORDER — CYCLOBENZAPRINE HCL 5 MG
5 TABLET ORAL
Status: DISCONTINUED | OUTPATIENT
Start: 2019-12-13 | End: 2019-12-17 | Stop reason: HOSPADM

## 2019-12-13 RX ORDER — DEXTROSE MONOHYDRATE 100 MG/ML
125-250 INJECTION, SOLUTION INTRAVENOUS AS NEEDED
Status: DISCONTINUED | OUTPATIENT
Start: 2019-12-13 | End: 2019-12-17 | Stop reason: HOSPADM

## 2019-12-13 RX ORDER — HYDROXYZINE PAMOATE 25 MG/1
50 CAPSULE ORAL
Status: DISCONTINUED | OUTPATIENT
Start: 2019-12-13 | End: 2019-12-17 | Stop reason: HOSPADM

## 2019-12-13 RX ORDER — HYDROXYZINE PAMOATE 25 MG/1
25 CAPSULE ORAL
Status: DISCONTINUED | OUTPATIENT
Start: 2019-12-13 | End: 2019-12-17 | Stop reason: HOSPADM

## 2019-12-13 RX ORDER — FUROSEMIDE 10 MG/ML
40 INJECTION INTRAMUSCULAR; INTRAVENOUS 2 TIMES DAILY
Status: DISCONTINUED | OUTPATIENT
Start: 2019-12-13 | End: 2019-12-14

## 2019-12-13 RX ADMIN — CYCLOBENZAPRINE HYDROCHLORIDE 5 MG: 5 TABLET, FILM COATED ORAL at 12:54

## 2019-12-13 RX ADMIN — FUROSEMIDE 40 MG: 10 INJECTION, SOLUTION INTRAMUSCULAR; INTRAVENOUS at 17:23

## 2019-12-13 RX ADMIN — HYDROXYZINE PAMOATE 50 MG: 25 CAPSULE ORAL at 02:57

## 2019-12-13 RX ADMIN — HYDROXYZINE PAMOATE 25 MG: 25 CAPSULE ORAL at 12:54

## 2019-12-13 RX ADMIN — GABAPENTIN 600 MG: 300 CAPSULE ORAL at 08:34

## 2019-12-13 RX ADMIN — CARVEDILOL 6.25 MG: 6.25 TABLET, FILM COATED ORAL at 17:23

## 2019-12-13 RX ADMIN — Medication 10 ML: at 06:22

## 2019-12-13 RX ADMIN — INSULIN GLARGINE 10 UNITS: 100 INJECTION, SOLUTION SUBCUTANEOUS at 22:36

## 2019-12-13 RX ADMIN — HEPARIN SODIUM 5000 UNITS: 5000 INJECTION INTRAVENOUS; SUBCUTANEOUS at 23:30

## 2019-12-13 RX ADMIN — CARVEDILOL 6.25 MG: 6.25 TABLET, FILM COATED ORAL at 08:34

## 2019-12-13 RX ADMIN — GABAPENTIN 600 MG: 300 CAPSULE ORAL at 17:23

## 2019-12-13 RX ADMIN — SACUBITRIL AND VALSARTAN 1 TABLET: 97; 103 TABLET, FILM COATED ORAL at 09:00

## 2019-12-13 RX ADMIN — LEVOTHYROXINE, LIOTHYRONINE 30 MG: 19; 4.5 TABLET ORAL at 06:18

## 2019-12-13 RX ADMIN — Medication 5 ML: at 22:38

## 2019-12-13 RX ADMIN — FUROSEMIDE 40 MG: 10 INJECTION, SOLUTION INTRAMUSCULAR; INTRAVENOUS at 00:00

## 2019-12-13 RX ADMIN — DOCUSATE SODIUM 100 MG: 100 CAPSULE, LIQUID FILLED ORAL at 17:24

## 2019-12-13 RX ADMIN — HEPARIN SODIUM 5000 UNITS: 5000 INJECTION INTRAVENOUS; SUBCUTANEOUS at 00:01

## 2019-12-13 RX ADMIN — HEPARIN SODIUM 5000 UNITS: 5000 INJECTION INTRAVENOUS; SUBCUTANEOUS at 08:34

## 2019-12-13 RX ADMIN — Medication 10 ML: at 00:01

## 2019-12-13 RX ADMIN — FUROSEMIDE 40 MG: 10 INJECTION, SOLUTION INTRAMUSCULAR; INTRAVENOUS at 13:00

## 2019-12-13 RX ADMIN — PERFLUTREN 2 ML: 6.52 INJECTION, SUSPENSION INTRAVENOUS at 14:00

## 2019-12-13 RX ADMIN — DOCUSATE SODIUM 100 MG: 100 CAPSULE, LIQUID FILLED ORAL at 08:34

## 2019-12-13 RX ADMIN — HEPARIN SODIUM 5000 UNITS: 5000 INJECTION INTRAVENOUS; SUBCUTANEOUS at 17:24

## 2019-12-13 RX ADMIN — SACUBITRIL AND VALSARTAN 1 TABLET: 97; 103 TABLET, FILM COATED ORAL at 18:00

## 2019-12-13 NOTE — PROGRESS NOTES
Progress Note         Patient: Hannah Hernandez MRN: [id number]  CSN: [id number]    YOB: 1950  Age: 71 y.o. Sex: female    DOA: 12/12/2019 LOS:  LOS: 1 day                    Subjective:     Hannah Hernandez is a 71 y.o. female with a PMHx of systolic CHF w/PPM, paraplegia s/p ruptured hematoma in L spine, HTN, Type II DM, chronic R heel ulcer and suprapubic cathter who is admitted for acute on chronic systolic CHF. Presented to ED with 4 days increasing GROVER, L swelling and orthopnea   NT pro-BNP 3357, troponin 0.05>0.05>0.04     Comfortable in bed   Breathing improved, not at baseline  C/o itching and back spasms        Objective:     Physical Exam:  Visit Vitals  /68   Pulse 74   Temp 97.4 °F (36.3 °C)   Resp 18   Ht 5' 7\" (1.702 m)   Wt 108 kg (238 lb)   SpO2 100%   Breastfeeding No   BMI 37.28 kg/m²        General:         Alert, cooperative, no acute distress    HEENT: NC, Atraumatic. Anicteric sclerae. Lungs: Diminished sound with rales in bases   Heart:  Regular  rhythm,  No murmur, No Rubs, No Gallops  Abdomen: Soft, Non distended, Non tender. +Bowel sounds, + suprapubic catheter   Extremities: 2+ BLE pitting edema to the knee, R heel with clean, dry bandage in place    Psych:   Good insight. Not anxious or agitated  Neurologic:  Alert and oriented X 3; 5/5 strength X 4 extremities     Intake and Output:  Current Shift:  No intake/output data recorded.   Last three shifts:  12/11 1901 - 12/13 0700  In: 360 [P.O.:360]  Out: 925 [Urine:925]    Labs: Results:       Chemistry Recent Labs     12/13/19  0518 12/12/19  1602   * 126*    143   K 3.9 4.5    107   CO2 29 29   BUN 35* 30*   CREA 1.68* 1.51*   CA 9.4 10.1   AGAP 6 7   BUCR 21* 20    126*   TP 7.0 8.1   ALB 3.3* 4.1   GLOB 3.7 4.0   AGRAT 0.9 1.0      CBC w/Diff Recent Labs     12/13/19  0518 12/12/19  1602   WBC 6.2 8.0   RBC 4.40 5.15   HGB 12.4 14.7   HCT 35.9 41.8    224   GRANS 52 66   LYMPH 35 24 EOS 2 2      Cardiac Enzymes Recent Labs     12/13/19  0518 12/13/19  0010   * 227*   CKND1 1.7 2.0      Coagulation Recent Labs     12/12/19  1602   PTP 12.4   INR 0.9   APTT 26.1       Lipid Panel Lab Results   Component Value Date/Time    Cholesterol, total 127 08/17/2018 02:40 AM    HDL Cholesterol 42 08/17/2018 02:40 AM    LDL, calculated 61.6 08/17/2018 02:40 AM    VLDL, calculated 23.4 08/17/2018 02:40 AM    Triglyceride 117 08/17/2018 02:40 AM    CHOL/HDL Ratio 3.0 08/17/2018 02:40 AM      BNP No results for input(s): BNPP in the last 72 hours. Liver Enzymes Recent Labs     12/13/19  0518   TP 7.0   ALB 3.3*      SGOT 32      Thyroid Studies Lab Results   Component Value Date/Time    TSH 0.51 04/20/2017 07:26 PM                Assessment and Plan:     Berhane Sportsman is a 71 y.o. female with a PMHx of systolic CHF w/PPM, paraplegia s/p ruptured hematoma in L spine, HTN, Type II DM, chronic R heel ulcer and suprapubic cathter who is admitted for acute on chronic systolic CHF. Condition is improved but not baseline. 1. Acute on chronic systolic CHF- Echo 2/17 showed EF 26-30%,   2. CKD Stage III   3. HTN   4. Type II DM   5. Hypothyroidism   6. Hx suprapubic catheter- exchanged 12/12 by urology   7. Hx paraplegia  8. Chronic back spams   9. Itching       Cardiology consulted, appreciate recommendations   Cont IV lasix   FU Echo   Monitor BMP, CBC, check HbA1c   Will need to hold Entresto if renal fxn worsens   Cont home meds: carvedilol, entresto   Cont home lantus, SSI w/accuchecks   Cont home meds: gabapentin, armour   Flexeril prn, hydroxyzine prn, zofran prn   PT, OT       Case discussed with:  [x]Patient  [x]Family  [x]Nursing  [x]Case Management  DVT prophylaxis: SQH   Diet: Cardiac   Contact:   Code Status: FULL   Disposition: Cont care in tele, >2 midnights       COURT Klein, DO  12/13/2019

## 2019-12-13 NOTE — CONSULTS
[facility] - Consult Note    Consultation request by Win Norman DO for advice/opinion related to evaluating Elevated troponin [R79.89]  Dyspnea [R06.00]    Date of  Admission: 12/12/2019  2:25 PM   Primary Care Physician:  Maurice Cantu MD     Assessment:     Patient Active Problem List   Diagnosis Code    Nonischemic cardiomyopathy ([facility] I42.8    History of complete heart block Z86.79    Biventricular implantable cardioverter-defibrillator in situ Z95.810    Left bundle branch block (LBBB) on electrocardiogram I44.7    Type 2 diabetes mellitus with diabetic neuropathy ([facility] E11.40    Dyslipidemia E78.5    Diabetic neuropathy associated with type 2 diabetes mellitus ([facility] E11.40    Obstructive sleep apnea on CPAP G47.33, Z99.89    AICD generator infection ([facility] T82. 7XXA    Difficult airway for intubation T88. 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 1XXA    Impaired mobility and ADLs Z74.09    History of Coumadin therapy Z92.29    Gout M10.9    Acute paraplegia (HCC) G82.20    Sepsis (HCC) A41.9    Psoas abscess, right ([facility] K68.12    Krueger catheter in place on admission Z96.0    Urinary tract infection due to Enterococcus N39.0, B95.2    Group B streptococcal infection A49.1    Hypervolemia E87.70    Anemia D64.9    Biventricular cardiac pacemaker in situ Z95.0    Neurogenic bladder N31.9    Type 2 diabetes with nephropathy (HCC) E11.21    Obesity (BMI 30.0-34. 9) E66.9    Severe obesity (BMI 35.0-39. 9) with comorbidity ([facility] E66.01    Osteomyelitis ([facility] M86.9    Foot osteomyelitis, right ([facility] M86.9    Heel ulcer ([facility] L97.409    Infected wound T14. 8XXA, L08.9    Diabetic polyneuropathy associated with type 2 diabetes mellitus ([facility] E11.42    Non-healing wound of right heel S91.301A    Urinary retention R33.9    Dyspnea R06.00    Elevated troponin R79.89       -Acute on chronic systolic heart failure. Symptomatically improving with IV lasix.  -Chronic kidney disease. Creatinine worsening since last month, but appears creatinine 1.4-1.6 over the previous few months.  -Nonischemic cardiomyopathy. EF 26-30% on echo 5/2019. widely patent coronaries 8/1996. Nuclear stress 6/2015 without ischemia. -Biventricular PPM 10/2012 (in abdomen) with generator change out 2/2019. She previously had a biventricular ICD but it was removed due to infection at the site.  -Hypertension. Low normal BP.  -H/o suprapubic catheter. Recent change out. Increased tenderness suprapubic area. -H/o recurrent infections.  -Paraplegia. Fall 4/2017 resulting in trauma to her back developing an infected right psoas hematoma with development of an epidural abscess with neurological compromise with resulting paraplegia. Primary cardiologist Dr. Salo Parsons. Plan:     Would continue diuresis with IV lasix as renal function allows, patient feeling symptomatic improvement overnight. Patient is continued on entresto, if renal function worsens will need to hold. Patient is continued on coreg. May need to consider urologic consult with significant tenderness at suprapubic site. Will review echocardiogram once complete. Will interrogate pacemaker. History of Present Illness: This is a 71 y.o. female admitted for Elevated troponin [R79.89]  Dyspnea [R06.00]. Patient complains of: SOB, edema. Patient is a 71year old female who has history of [address] and heart failure.  Patient has had recent increased weight gain, edema along with SOB and orthopnea. Patient has abdominal pain and tenderness. Patient denies CP, palp, syncope. Patient was treated with IV lasix and feels edema is improving. Cardiac risk factors: obesity, sedentary life style, hypertension      Review of Symptoms:  Except as stated above include:  Constitutional:  negative  Respiratory:  negative  Cardiovascular: C/o weight gain, SOB, orthopnea. Denies CP, palp, syncope. Gastrointestinal: negative  Genitourinary:  negative  Musculoskeletal:  Negative  Neurological:  Negative  Dermatological:  Negative  Endocrinological: Negative  Psychological:  Negative         Past Medical History:     Past Medical History:   Diagnosis Date    Acute paraplegia ([facility] 4/20/2017    Benign hypertensive heart disease with systolic CHF, NYHA class 2 ([facility] 9/5/2012    Biventricular implantable cardioverter-defibrillator in situ 04/28/2005    Upgraded to BiV AICD; gen change 4/2008; pocket revision 10/2009; Abdominal - done on 8/22/2012 by Dr. Wilber Velasquez Cardiac cath 08/15/1996    Patent coronaries. Elev LVEDP. EF 50-55%.  Cardiac echocardiogram 06/23/2015    Ltd study. EF 45-50%. Mild, diffuse hypk. Severe apical hypk. No mass or thrombus was clearly identified, although imaging was suboptimal.      Cardiac nuclear imaging test 06/19/2015    Fixed distal apical, distal septal defect more likely due to RV pacing than prior infarct. No ischemia. EF 46%. RWMA c/w RV pacing. Nondiagnostic EKG on pharm stress test.      Cardiovascular lower extremity venous duplex 09/04/2012    Acute, non-occlusive DVT in CFV on right. No DVT on left. No superficial thrombosis bilaterally.  Cardiovascular upper extremity venous duplex 08/27/2012    DVT in axillary vein on left. Left subclavian was not visualized.     Chronic anemia 9/5/2012    Chronic systolic heart failure (HCC)     Decreased calculated glomerular filtration rate (GFR) 3/30/2017    Calculated GFR equivalent to that of CKD stage 3 = 30-59 ml/min    Diabetic neuropathy associated with type 2 diabetes mellitus ([facility] 6/28/2011    Difficult airway for intubation 08/22/2012    see anesthesia airway note    Dyslipidemia 6/28/2011    Gout     History of complete heart block 6/28/2011    History of Coumadin therapy     Anticoagulation for DVT of the LUE; Discontinued on 3/30/2017    History of deep venous thrombosis 9/5/2012    Left upper extremity    History of pyelonephritis 3/30/2017    Left bundle branch block (LBBB) on electrocardiogram 6/28/2011    Nonischemic cardiomyopathy ([facility] 6/28/2011    Obesity (BMI 35.0-39.9 without comorbidity) 3/13/2017    Obstructive sleep apnea on CPAP 2/7/2012    Psoas abscess, right ([facility] 4/20/2017    Psoas hematoma, right, secondary to anticoagulant therapy 3/30/2017    Type 2 diabetes mellitus with diabetic neuropathy ([facility] 6/28/2011         Social History:     Social History     Socioeconomic History    Marital status:      Spouse name: Not on file    Number of children: Not on file    Years of education: Not on file    Highest education level: Not on file   Tobacco Use    Smoking status: Never Smoker    Smokeless tobacco: Never Used   Substance and Sexual Activity    Alcohol use: Not Currently    Drug use: No    Sexual activity: Not Currently     Partners: Male        Family History:     Family History   Problem Relation Age of Onset    Cancer Father         Leukemia        Medications:      Allergies   Allergen Reactions    Latex Itching    Vancomycin Itching    Ampicillin Itching    Bactrim [Sulfamethoxazole-Trimethoprim] Itching    Blueberry Swelling     Causes throat swelling    Ciprofloxacin Itching    Codeine Other (comments)     Jumpy feeling    Crestor [Rosuvastatin] Itching    Darvocet A500 [Propoxyphene N-Acetaminophen] Itching    Demerol [Meperidine] Itching    Levaquin [Levofloxacin] Itching    Lipitor [Atorvastatin] Myalgia    Magnesium Oxide Itching     nausea    Minocin [Minocycline] Itching    Pcn [Penicillins] Itching    Pravachol [Pravastatin] Swelling     Swelling in mouth.    Not allergic per patient, takes at home    Shellfish Derived Swelling     itching    Sulfa (Sulfonamide Antibiotics) Itching    Ultracet [Tramadol-Acetaminophen] Itching    Vicodin [Hydrocodone-Acetaminophen] Itching    Vytorin 10-10 [Ezetimibe-Simvastatin] Myalgia    Percodan [Oxycodone Hcl-Oxycodone-Asa] Itching        Current Facility-Administered Medications   Medication Dose Route Frequency    hydrOXYzine pamoate (VISTARIL) capsule 50 mg  50 mg Oral QHS PRN    dextrose 10% infusion 125-250 mL  125-250 mL IntraVENous PRN    insulin glargine (LANTUS) injection 10 Units  10 Units SubCUTAneous QHS    insulin lispro (HUMALOG) injection   SubCUTAneous AC&HS    sodium chloride (NS) flush 5-40 mL  5-40 mL IntraVENous Q8H    sodium chloride (NS) flush 5-40 mL  5-40 mL IntraVENous PRN    diphenhydrAMINE (BENADRYL) injection 25 mg  25 mg IntraVENous Q4H PRN    ondansetron (ZOFRAN) injection 4 mg  4 mg IntraVENous Q4H PRN    docusate sodium (COLACE) capsule 100 mg  100 mg Oral BID    heparin (porcine) injection 5,000 Units  5,000 Units SubCUTAneous Q8H    thyroid (Pork) (ARMOUR) tablet 30 mg  30 mg Oral 6am    carvedilol (COREG) tablet 6.25 mg  6.25 mg Oral BID    gabapentin (NEURONTIN) capsule 600 mg  600 mg Oral BID    sacubitril-valsartan (ENTRESTO)  mg tablet 1 Tab  1 Tab Oral BID         Physical Exam:     Visit Vitals  /68 (BP 1 Location: Left arm, BP Patient Position: At rest)   Pulse 78   Temp 97.7 °F (36.5 °C)   Resp 18   Ht 5' 7\" (1.702 m)   Wt 108 kg (238 lb)   SpO2 100%   Breastfeeding No   BMI 37.28 kg/m²     BP Readings from Last 3 Encounters:   12/13/19 107/68   10/16/19 116/74   09/17/19 134/79     Pulse Readings from Last 3 Encounters:   12/13/19 78   09/17/19 72 09/16/19 65     Wt Readings from Last 3 Encounters:   12/13/19 108 kg (238 lb)   10/16/19 107 kg (236 lb)   10/01/19 107 kg (236 lb)       General:  alert, cooperative, no distress, appears stated age  Neck:  no JVD  Lungs:  clear to auscultation bilaterally  Heart:  regular rate and rhythm  Abdomen:  abdomen is soft tender suprapubic  Extremities:  extremities normal, atraumatic, no cyanosis trace to 1+ edema  Skin: Warm and dry. no hyperpigmentation, vitiligo, or suspicious lesions  Neuro: alert, oriented x3, affect appropriate  Psych: non focal     Data Review:     Recent Labs     12/13/19  0518 12/12/19  1602   WBC 6.2 8.0   HGB 12.4 14.7   HCT 35.9 41.8    224     Recent Labs     12/13/19  0518 12/12/19  1602    143   K 3.9 4.5    107   CO2 29 29   * 126*   BUN 35* 30*   CREA 1.68* 1.51*   CA 9.4 10.1   MG 2.2 2.2   PHOS 4.1  --    ALB 3.3* 4.1   SGOT 32 45*   ALT 40 55   INR  --  0.9       Results for orders placed or performed during the hospital encounter of 12/12/19   EKG, 12 LEAD, INITIAL   Result Value Ref Range    Ventricular Rate 93 BPM    Atrial Rate 93 BPM    P-R Interval 138 ms    QRS Duration 180 ms    Q-T Interval 460 ms    QTC Calculation (Bezet) 571 ms    Calculated P Axis 72 degrees    Calculated R Axis -83 degrees    Calculated T Axis 7 degrees    Diagnosis       Atrial-sensed ventricular-paced rhythm  Abnormal ECG  When compared with ECG of 04-SEP-2019 10:16,  Vent. rate has increased BY  20 BPM  Confirmed by Warren Dougherty MD, ----- ([phone] on 12/12/2019 4:36:02 PM     Results for orders placed or performed in visit on 02/07/19   AMB POC EKG ROUTINE W/ 12 LEADS, INTER & REP    Narrative    Normal sinus mechanism with atrial sensing and biventricular pacing with a rate of 80. Results for orders placed or performed in visit on 08/07/14   PACEMAKER CHECK    Impression    Bi-V paced - 100%; A-sensed - 74%; Lead impedances and threshold  WNL;  No events       All Cardiac Markers in the last 24 hours:    Lab Results   Component Value Date/Time     (H) 12/13/2019 05:18 AM     (H) 12/13/2019 12:10 AM     (H) 12/12/2019 06:16 PM     (H) 12/12/2019 04:02 PM    CKMB 4.9 (H) 12/13/2019 05:18 AM    CKMB 4.5 (H) 12/13/2019 12:10 AM    CKMB 4.4 (H) 12/12/2019 06:16 PM    CKMB 5.4 (H) 12/12/2019 04:02 PM    CKND1 1.7 12/13/2019 05:18 AM    CKND1 2.0 12/13/2019 12:10 AM    CKND1 1.4 12/12/2019 06:16 PM    CKND1 2.1 12/12/2019 04:02 PM    TROIQ 0.04 12/13/2019 05:18 AM    TROIQ 0.05 (H) 12/13/2019 12:10 AM    TROIQ 0.05 (H) 12/12/2019 06:16 PM    TROIQ 0.03 12/12/2019 04:02 PM       Last Lipid:    Lab Results   Component Value Date/Time    Cholesterol, total 127 08/17/2018 02:40 AM    HDL Cholesterol 42 08/17/2018 02:40 AM    LDL, calculated 61.6 08/17/2018 02:40 AM    Triglyceride 117 08/17/2018 02:40 AM    CHOL/HDL Ratio 3.0 08/17/2018 02:40 AM       Signed By: LUDMILA Yancey     December 13, 2019

## 2019-12-13 NOTE — PROGRESS NOTES
NUTRITION    Nursing Referral: Pressure Injury     RECOMMENDATIONS / PLAN:     - Add double protein portion to all meals.  - Add supplements: Lukas Drink BID.  - Continue RD inpatient monitoring and evaluation. NUTRITION INTERVENTIONS & DIAGNOSIS:     - Meals/snacks: modify composition  - Medical food supplement therapy: initiate   - Collaboration and referral of nutrition care: interdisciplinary rounds    Nutrition Diagnosis: Increased nutrient needs protein related to increased demand for wound healing as evidenced by pt with pressure injury    ASSESSMENT:     Paraplegia s/o infection in her lumbar spine. Pressure injury noted, excellent meal intake since admission, 100% of lunch today. Tolerating diet. Agreeable to supplements.     Nutritional intake adequate to meet patients estimated nutritional needs:  No    Diet: DIET DIABETIC CONSISTENT CARB Regular      Food Allergies: Blueberry, Shellfish  Current Appetite: Good  Appetite/meal intake prior to admission: Good decreased intake x 2 weeks (2 meals/day)  Feeding Limitations:  [] Swallowing difficulty    [] Chewing difficulty    [] Other:  Current Meal Intake:   Patient Vitals for the past 100 hrs:   % Diet Eaten   12/13/19 1414 100 %   12/13/19 0934 95 %       BM: 12/12  Skin Integrity: stage 3 pressure injury to right heel,  Edema:   [x] No     [] Yes   Pertinent Medications: Reviewed: colace, furosemide, lantus (10 units), SSI, ondansetron    Recent Labs     12/13/19  0518 12/12/19  1602    143   K 3.9 4.5    107   CO2 29 29   * 126*   BUN 35* 30*   CREA 1.68* 1.51*   CA 9.4 10.1   MG 2.2 2.2   PHOS 4.1  --    ALB 3.3* 4.1   SGOT 32 45*   ALT 40 55       Intake/Output Summary (Last 24 hours) at 12/13/2019 1414  Last data filed at 12/13/2019 0934  Gross per 24 hour   Intake 360 ml   Output 925 ml   Net -565 ml       Anthropometrics:  Ht Readings from Last 1 Encounters:   12/13/19 5' 7\" (1.702 m)     Last 3 Recorded Weights in this Encounter    12/12/19 2236 12/13/19 0430 12/13/19 1350   Weight: 108 kg (238 lb) 108 kg (238 lb) 108 kg (238 lb)     Body mass index is 37.28 kg/m². Obese Class I    Weight History: Pt reports a UBW of 232 lbs. Weight Metrics 12/13/2019 12/12/2019 10/16/2019 10/1/2019 9/17/2019 8/19/2019 8/7/2019   Weight 238 lb - 236 lb 236 lb 236 lb - 236 lb   BMI - 37.28 kg/m2 36.96 kg/m2 36.96 kg/m2 36.96 kg/m2 36.96 kg/m2 36.96 kg/m2        Admitting Diagnosis: Elevated troponin [R79.89]  Dyspnea [R06.00]  Pertinent PMHx: acute paraplegia, chronic heart failure, DM with neuropathy, dyslipidemia, DVT, HALI    Education Needs:        [x] None identified  [] Identified - Not appropriate at this time  []  Identified and addressed - refer to education log  Learning Limitations:   [x] None identified  [] Identified    Cultural, Latter-day & ethnic food preferences:  [x] None identified    [] Identified and addressed     ESTIMATED NUTRITION NEEDS:     Calories: 9764-6528 kcal (MSJx1.2-1.3) based on  [x] Actual BW: 108 kg      [] IBW   Protein: 130-162 gm (1.2-1.5 gm/kg) based on  [x] Actual BW      [] IBW   Fluid: 1 mL/kcal     MONITORING & EVALUATION:     Nutrition Goal(s):   - PO nutrition intake will meet >75% of patient estimated nutritional needs within the next 7 days. Outcome: New/Initial goal     Monitoring:   [x] Food and nutrient intake   [x] Food and nutrient administration  [x] Comparative standards   [x] Nutrition-focused physical findings   [x] Anthropometric Measurements   [x] Treatment/therapy   [x] Biochemical data, medical tests, and procedures        Previous Recommendations (for follow-up assessments only):  Not Applicable     Discharge Planning: No nutritional discharge needs at this time. Participated in care planning, discharge planning, & interdisciplinary rounds as appropriate.       Mary Burnette RD  Pager: [phone]

## 2019-12-13 NOTE — ROUTINE PROCESS
TRANSFER - IN REPORT: 
 
Verbal report received from Christiane(name) on Mery Ramirez  being received from ER(unit) for routine progression of care Report consisted of patients Situation, Background, Assessment and  
Recommendations(SBAR). Information from the following report(s) Kardex and MAR was reviewed with the receiving nurse. Opportunity for questions and clarification was provided. Assessment completed upon patients arrival to unit and care assumed. 10:32 PM - Received pt from ER per stretcher, accompanied by daughter in no distress. Orientation to room provided. dressing to right heel wound changed and suprapubic catheter dressing changed. Primary Nurse Nory Menon, DANIEL and Sherry Griffith RN performed a dual skin assessment on this patient Impairment noted- see wound doc flow sheet Surjit score is 17. 
 
 
2:48 AM - Dr. Timoteo Marshall came to bedside to evaluate patient. Patient complaining of unable to sleep and itching. 7:40 AM - Bedside shift change report given to JOEL Medina (oncoming nurse) by Pipo Sanders RN (offgoing nurse). Report given with SBAR, Kardex, Intake/Output, MAR and Recent Results.

## 2019-12-13 NOTE — H&P
History & Physical    Patient: Mery Ramirez MRN: [id number]  CSN: [id number]    YOB: 1950  Age: 71 y.o. Sex: female      DOA: 12/12/2019       HPI:     Mery Ramirez is a 71 y.o. female with a history of diabetes mellitus with neuropathy and chronic right heel ulcer followed by Dr. Red Nelson;  systolic congestive heart failure, biventricular implantable defibrillator that was relocated to an abdominal compartment 2012; psoas hematoma secondary to anticoagulant therapy in 2017 and paraplegia with suprapubic catheter. Around 12/8/2019 patient developed increased shortness of breath and orthopnea. Patient presented for change of suprapubic catheter on 12/12/2019, and was noted to have increased shortness of breath postprocedure and sent to the emergency room at [facility]. In the ED, Ms. Cha Graham had a lactic acid of 1.7, WBC 8.0, hemoglobin 14.7, hematocrit 41.8, platelets 670, sodium 143, potassium 4.5, chloride 107, CO2 29, blood glucose 126, BUN 30 creatinine 1.51 GFR 34, BNP 3357, troponin  0.03, A1c 5.9. The patient received Lasix 40 mg IV x2 with effective diuresis and persistent symptoms. Patient was admitted to telemetry with consults nephrology and cardiology. Past Medical History:   Diagnosis Date    Acute paraplegia ([facility] 4/20/2017    Benign hypertensive heart disease with systolic CHF, NYHA class 2 ([facility] [address] 9/5/2012    Biventricular implantable cardioverter-defibrillator in situ 04/28/2005    Upgraded to BiV AICD; gen change 4/2008; pocket revision 10/2009; Abdominal - done on 8/22/2012 by Dr. Anisha Beltran Cardiac cath 08/15/1996    Patent coronaries. Elev LVEDP. EF 50-55%.  Cardiac echocardiogram 06/23/2015    Ltd study. EF 45-50%. Mild, diffuse hypk. Severe apical hypk.   No mass or thrombus was clearly identified, although imaging was suboptimal.      Cardiac nuclear imaging test 06/19/2015    Fixed distal apical, distal septal defect more likely due to RV pacing than prior infarct. No ischemia. EF 46%. RWMA c/w RV pacing. Nondiagnostic EKG on pharm stress test.      Cardiovascular lower extremity venous duplex 09/04/2012    Acute, non-occlusive DVT in CFV on right. No DVT on left. No superficial thrombosis bilaterally.  Cardiovascular upper extremity venous duplex 08/27/2012    DVT in axillary vein on left. Left subclavian was not visualized.     Chronic anemia 9/5/2012    Chronic systolic heart failure (HCC)     Decreased calculated glomerular filtration rate (GFR) 3/30/2017    Calculated GFR equivalent to that of CKD stage 3 = 30-59 ml/min    Diabetic neuropathy associated with type 2 diabetes mellitus ([address] 6/28/2011    Difficult airway for intubation 08/22/2012    see anesthesia airway note    Dyslipidemia 6/28/2011    Gout     History of complete heart block 6/28/2011    History of Coumadin therapy     Anticoagulation for DVT of the LUE; Discontinued on 3/30/2017    History of deep venous thrombosis 9/5/2012    Left upper extremity    History of pyelonephritis 3/30/2017    Left bundle branch block (LBBB) on electrocardiogram 6/28/2011    Nonischemic cardiomyopathy ([address] 6/28/2011    Obesity (BMI 35.0-39.9 without comorbidity) 3/13/2017    Obstructive sleep apnea on CPAP 2/7/2012    Psoas abscess, right ([address] 4/20/2017    Psoas hematoma, right, secondary to anticoagulant therapy 3/30/2017    Type 2 diabetes mellitus with diabetic neuropathy ([address] 6/28/2011       Past Surgical History:   Procedure Laterality Date    HX CARPAL TUNNEL RELEASE  4/07    right     HX CHOLECYSTECTOMY  1994    HX HYSTERECTOMY  1973    HX OTHER SURGICAL  6/11/2012    AICD revision    HX PACEMAKER  4/28/2005    [employer] AICD    KS REMVL PERM PM PLS GEN W/REPL PLSE GEN 2 LEAD SYS N/A 2/26/2019    REMOVE & REPLACE PPM GEN DUAL LEAD performed by Giselle Carranza MD at [facility] CATH LAB       Family History   Problem Relation Age of Onset    Cancer Father Leukemia       Social History     Socioeconomic History    Marital status:      Spouse name: Not on file    Number of children: Not on file    Years of education: Not on file    Highest education level: Not on file   Tobacco Use    Smoking status: Never Smoker    Smokeless tobacco: Never Used   Substance and Sexual Activity    Alcohol use: Not Currently    Drug use: No    Sexual activity: Not Currently     Partners: Male       Prior to Admission medications    Medication Sig Start Date End Date Taking? Authorizing Provider   carvedilol (COREG) 6.25 mg tablet Take 1 Tab by mouth two (2) times a day. 10/16/19  Yes Equilla Sizer, DO   sacubitril-valsartan (ENTRESTO) 97 mg/103 mg tablet Take 1 Tab by mouth two (2) times a day. 10/16/19  Yes Equilla Sizer, DO   furosemide (LASIX) 40 mg tablet Take 1 Tab by mouth daily. Patient taking differently: Take 60 mg by mouth daily. 10/16/19  Yes Equilla Sizer, DO   multivitamin (ONE A DAY) tablet Take 1 Tab by mouth daily. Yes Provider, Historical   metFORMIN ER (GLUCOPHAGE XR) 500 mg tablet Take 500 mg by mouth daily. 10/25/18  Yes Provider, Historical   cholecalciferol, VITAMIN D3, (VITAMIN D3) 5,000 unit tab tablet Take  by mouth daily. Yes Provider, Historical   gabapentin (NEURONTIN) 300 mg capsule Take 2 Caps by mouth two (2) times a day. Indications: NEUROPATHIC PAIN 6/16/17  Yes Pedro Velazquez MD   insulin glargine (LANTUS) 100 unit/mL injection 5 Units by SubCUTAneous route nightly. Indications: type 2 diabetes mellitus  Patient taking differently: 10 Units by SubCUTAneous route nightly. 5/25/17  Yes Stanislaw Marcano MD ARMOUR THYROID 30 mg tablet Take 1 Tab by mouth Daily (before breakfast). 11/14/19   Provider, Historical   ARMOUR THYROID 60 mg tablet Take 1 Tab by mouth Daily (before breakfast). 12/14/19   Provider, Historical   exenatide microspheres (BYDUREON) 2 mg serr 2 mg by SubCUTAneous route every seven (7) days. Provider, Historical   insulin lispro (HUMALOG) 100 unit/mL injection See sliding scale  Patient taking differently: 4 Units Before breakfast, lunch, and dinner. See sliding scale 6/16/17   Pedro Velazquez MD   folic acid (FOLVITE) 1 mg tablet Take 1 Tab by mouth daily. 5/25/17   Stanislaw Marcano MD       Allergies   Allergen Reactions    Latex Itching    Vancomycin Itching    Ampicillin Itching    Bactrim [Sulfamethoxazole-Trimethoprim] Itching    Blueberry Swelling     Causes throat swelling    Ciprofloxacin Itching    Codeine Other (comments)     Jumpy feeling    Crestor [Rosuvastatin] Itching    Darvocet A500 [Propoxyphene N-Acetaminophen] Itching    Demerol [Meperidine] Itching    Levaquin [Levofloxacin] Itching    Lipitor [Atorvastatin] Myalgia    Magnesium Oxide Itching     nausea    Minocin [Minocycline] Itching    Pcn [Penicillins] Itching    Pravachol [Pravastatin] Swelling     Swelling in mouth. Not allergic per patient, takes at home    Shellfish Derived Swelling     itching    Sulfa (Sulfonamide Antibiotics) Itching    Ultracet [Tramadol-Acetaminophen] Itching    Vicodin [Hydrocodone-Acetaminophen] Itching    Vytorin 10-10 [Ezetimibe-Simvastatin] Myalgia    Percodan [Oxycodone Hcl-Oxycodone-Asa] Itching     . Review of Systems  A 12 point review of systems was performed and is unremarkable except   as detailed in HPI.         Physical Exam:      Visit Vitals  /67   Pulse 95   Temp 97.8 °F (36.6 °C)   Resp 20   Ht 5' 7\" (1.702 m)   Wt 108 kg (238 lb)   SpO2 99%   Breastfeeding No   BMI 37.28 kg/m²       Physical Exam:  GENERAL: fatigued, cooperative, mild distress  HEENT speech clear and goal-directed, conjunctiva clear, anicteric, O2 nasal cannula on, facial symmetry, no oral lesions, pharynx clear, neck supple  Chest cicatrix left upper chest wall   heart rate 95 and regular, coarse breath sounds posteriorly, good airflow anteriorly  Abdomen diffuse periumbilical tenderness small anterior abdominal scar left lower quadrant-patient states AICD  Suprapubic catheter to gravity  Extremities moving upper extremities while in bed  Right heel with open ulceration-granulation tissue is present, no foul odor, small amount of serous sanguinous drainage, pulses positive      Lab/Data Review:  Labs: Results:       Chemistry Recent Labs     12/12/19  1602   *      K 4.5      CO2 29   BUN 30*   CREA 1.51*   CA 10.1   AGAP 7   BUCR 20   *   TP 8.1   ALB 4.1   GLOB 4.0   AGRAT 1.0      CBC w/Diff Recent Labs     12/12/19  1602   WBC 8.0   RBC 5.15   HGB 14.7   HCT 41.8      GRANS 66   LYMPH 24   EOS 2      Coagulation Recent Labs     12/12/19  1602   PTP 12.4   INR 0.9   APTT 26.1       Iron/Ferritin No results for input(s): IRON in the last 72 hours. No lab exists for component: TIBCCALC   BNP No results for input(s): BNPP in the last 72 hours. Cardiac Enzymes Recent Labs     12/12/19  1816 12/12/19  1602   * 257*   CKND1 1.4 2.1      Liver Enzymes Recent Labs     12/12/19  1602   TP 8.1   ALB 4.1   *   SGOT 45*      Thyroid Studies Lab Results   Component Value Date/Time    TSH 0.51 04/20/2017 07:26 PM          All Micro Results     Procedure Component Value Units Date/Time    CULTURE, Christina Liz STAIN [[id number]] Collected:  12/12/19 2158    Order Status:  Completed Specimen:  Wound from Heel Updated:  12/12/19 2235    INFLUENZA A & B AG (RAPID TEST) [[id number]] Collected:  12/12/19 1820    Order Status:  Completed Specimen:  Nasopharyngeal from Nasal washing Updated:  12/12/19 1859     Influenza A Antigen NEGATIVE         Comment: A negative result does not exclude influenza virus infection, seasonal or H1N1 due to suboptimal sensitivity. If influenza is circulating in your community, a diagnosis of influenza should be considered based on a patients clinical presentation and empiric antiviral treatment should be considered, if indicated. Influenza B Antigen NEGATIVE        CULTURE, BLOOD [[id number]] Collected:  12/12/19 1730    Order Status:  Completed Specimen:  Blood Updated:  12/12/19 1841    CULTURE, URINE [[id number]] Collected:  12/12/19 1602    Order Status:  Completed Specimen:  Clean catch Updated:  12/12/19 1623    CULTURE, BLOOD [[id number]] Collected:  12/12/19 1602    Order Status:  Completed Specimen:  Blood Updated:  12/12/19 1619          Imaging Reviewed:  XR Results (most recent):  Results from Hospital Encounter encounter on 12/12/19   XR CHEST SNGL V    Narrative PORTABLE CHEST    HISTORY: Dyspnea, palpitations    COMPARISON: 3/20/2019    FINDINGS: No evidence of pneumonia or acute pulmonary infiltrate. Lung volumes  are satisfactory and there is no evidence of a pleural effusion or pneumothorax. Cardiac size is borderline prominent. Pulmonary markings are within normal  limits. The patient is status post sternotomy. There are several pacing wires  projecting over the cardiac silhouette; unchanged since the prior study. Impression IMPRESSION: No evidence of acute cardiopulmonary disease. Mild cardiomegaly. No  significant interval changes.        Assessment:   Principal Problem:    Systolic CHF, acute on chronic ([address] (12/13/2019)    Active Problems:    Nonischemic cardiomyopathy ([address] (6/28/2011)      Type 2 diabetes mellitus with diabetic neuropathy ([address] (6/28/2011)      Heel ulcer ([address] (10/17/2018)      Dyspnea (12/12/2019)      Elevated troponin (12/12/2019)          Plan:   Admit to telemetry  Continue to diurese as renal tolerated  Nephrology has been consulted  Cardiology following-low up echo and pacemaker check  Continue wound care right heel    Full code    Kerrie Herr DO  12/12/2019, 11:25 PM

## 2019-12-14 LAB
ANION GAP SERPL CALC-SCNC: 5 MMOL/L (ref 3–18)
BASOPHILS # BLD: 0 K/UL (ref 0–0.1)
BASOPHILS NFR BLD: 1 % (ref 0–2)
BUN SERPL-MCNC: 52 MG/DL (ref 7–18)
BUN/CREAT SERPL: 22 (ref 12–20)
CALCIUM SERPL-MCNC: 9.1 MG/DL (ref 8.5–10.1)
CHLORIDE SERPL-SCNC: 106 MMOL/L (ref 100–111)
CO2 SERPL-SCNC: 29 MMOL/L (ref 21–32)
CREAT SERPL-MCNC: 2.34 MG/DL (ref 0.6–1.3)
DIFFERENTIAL METHOD BLD: ABNORMAL
EOSINOPHIL # BLD: 0.2 K/UL (ref 0–0.4)
EOSINOPHIL NFR BLD: 4 % (ref 0–5)
ERYTHROCYTE [DISTWIDTH] IN BLOOD BY AUTOMATED COUNT: 14.3 % (ref 11.6–14.5)
GLUCOSE BLD STRIP.AUTO-MCNC: 113 MG/DL (ref 70–110)
GLUCOSE BLD STRIP.AUTO-MCNC: 151 MG/DL (ref 70–110)
GLUCOSE BLD STRIP.AUTO-MCNC: 172 MG/DL (ref 70–110)
GLUCOSE BLD STRIP.AUTO-MCNC: 212 MG/DL (ref 70–110)
GLUCOSE SERPL-MCNC: 147 MG/DL (ref 74–99)
HBA1C MFR BLD: 5.8 % (ref 4.2–5.6)
HCT VFR BLD AUTO: 35.1 % (ref 35–45)
HGB BLD-MCNC: 11.8 G/DL (ref 12–16)
LYMPHOCYTES # BLD: 2.1 K/UL (ref 0.9–3.6)
LYMPHOCYTES NFR BLD: 41 % (ref 21–52)
MCH RBC QN AUTO: 27.6 PG (ref 24–34)
MCHC RBC AUTO-ENTMCNC: 33.6 G/DL (ref 31–37)
MCV RBC AUTO: 82.2 FL (ref 74–97)
MONOCYTES # BLD: 0.5 K/UL (ref 0.05–1.2)
MONOCYTES NFR BLD: 10 % (ref 3–10)
NEUTS SEG # BLD: 2.3 K/UL (ref 1.8–8)
NEUTS SEG NFR BLD: 44 % (ref 40–73)
PLATELET # BLD AUTO: 160 K/UL (ref 135–420)
PMV BLD AUTO: 10.1 FL (ref 9.2–11.8)
POTASSIUM SERPL-SCNC: 4.2 MMOL/L (ref 3.5–5.5)
RBC # BLD AUTO: 4.27 M/UL (ref 4.2–5.3)
SODIUM SERPL-SCNC: 140 MMOL/L (ref 136–145)
TSH SERPL DL<=0.05 MIU/L-ACNC: 0.29 UIU/ML (ref 0.36–3.74)
WBC # BLD AUTO: 5.1 K/UL (ref 4.6–13.2)

## 2019-12-14 PROCEDURE — 77010033678 HC OXYGEN DAILY

## 2019-12-14 PROCEDURE — 82962 GLUCOSE BLOOD TEST: CPT

## 2019-12-14 PROCEDURE — 97530 THERAPEUTIC ACTIVITIES: CPT

## 2019-12-14 PROCEDURE — 83036 HEMOGLOBIN GLYCOSYLATED A1C: CPT

## 2019-12-14 PROCEDURE — 80048 BASIC METABOLIC PNL TOTAL CA: CPT

## 2019-12-14 PROCEDURE — 74011250637 HC RX REV CODE- 250/637: Performed by: HOSPITALIST

## 2019-12-14 PROCEDURE — 74011636637 HC RX REV CODE- 636/637: Performed by: HOSPITALIST

## 2019-12-14 PROCEDURE — 74011250637 HC RX REV CODE- 250/637: Performed by: FAMILY MEDICINE

## 2019-12-14 PROCEDURE — 85025 COMPLETE CBC W/AUTO DIFF WBC: CPT

## 2019-12-14 PROCEDURE — 65660000000 HC RM CCU STEPDOWN

## 2019-12-14 PROCEDURE — 97161 PT EVAL LOW COMPLEX 20 MIN: CPT

## 2019-12-14 PROCEDURE — 84443 ASSAY THYROID STIM HORMONE: CPT

## 2019-12-14 PROCEDURE — 74011250637 HC RX REV CODE- 250/637: Performed by: INTERNAL MEDICINE

## 2019-12-14 PROCEDURE — 36415 COLL VENOUS BLD VENIPUNCTURE: CPT

## 2019-12-14 PROCEDURE — 74011250636 HC RX REV CODE- 250/636: Performed by: HOSPITALIST

## 2019-12-14 PROCEDURE — 74011250636 HC RX REV CODE- 250/636: Performed by: PHYSICIAN ASSISTANT

## 2019-12-14 RX ORDER — FUROSEMIDE 10 MG/ML
40 INJECTION INTRAMUSCULAR; INTRAVENOUS DAILY
Status: DISCONTINUED | OUTPATIENT
Start: 2019-12-15 | End: 2019-12-15

## 2019-12-14 RX ORDER — GABAPENTIN 300 MG/1
300 CAPSULE ORAL 2 TIMES DAILY
Status: DISCONTINUED | OUTPATIENT
Start: 2019-12-14 | End: 2019-12-17 | Stop reason: HOSPADM

## 2019-12-14 RX ADMIN — LEVOTHYROXINE, LIOTHYRONINE 30 MG: 19; 4.5 TABLET ORAL at 07:16

## 2019-12-14 RX ADMIN — INSULIN LISPRO 2 UNITS: 100 INJECTION, SOLUTION INTRAVENOUS; SUBCUTANEOUS at 08:19

## 2019-12-14 RX ADMIN — GABAPENTIN 600 MG: 300 CAPSULE ORAL at 08:18

## 2019-12-14 RX ADMIN — CARVEDILOL 6.25 MG: 6.25 TABLET, FILM COATED ORAL at 17:52

## 2019-12-14 RX ADMIN — SACUBITRIL AND VALSARTAN 1 TABLET: 97; 103 TABLET, FILM COATED ORAL at 09:00

## 2019-12-14 RX ADMIN — CYCLOBENZAPRINE HYDROCHLORIDE 5 MG: 5 TABLET, FILM COATED ORAL at 12:39

## 2019-12-14 RX ADMIN — HEPARIN SODIUM 5000 UNITS: 5000 INJECTION INTRAVENOUS; SUBCUTANEOUS at 17:52

## 2019-12-14 RX ADMIN — DOCUSATE SODIUM 100 MG: 100 CAPSULE, LIQUID FILLED ORAL at 17:52

## 2019-12-14 RX ADMIN — FUROSEMIDE 40 MG: 10 INJECTION, SOLUTION INTRAMUSCULAR; INTRAVENOUS at 08:18

## 2019-12-14 RX ADMIN — INSULIN GLARGINE 10 UNITS: 100 INJECTION, SOLUTION SUBCUTANEOUS at 22:42

## 2019-12-14 RX ADMIN — GABAPENTIN 300 MG: 300 CAPSULE ORAL at 17:52

## 2019-12-14 RX ADMIN — CARVEDILOL 6.25 MG: 6.25 TABLET, FILM COATED ORAL at 08:18

## 2019-12-14 RX ADMIN — Medication 5 ML: at 06:00

## 2019-12-14 RX ADMIN — HEPARIN SODIUM 5000 UNITS: 5000 INJECTION INTRAVENOUS; SUBCUTANEOUS at 08:18

## 2019-12-14 RX ADMIN — HEPARIN SODIUM 5000 UNITS: 5000 INJECTION INTRAVENOUS; SUBCUTANEOUS at 22:41

## 2019-12-14 RX ADMIN — INSULIN LISPRO 4 UNITS: 100 INJECTION, SOLUTION INTRAVENOUS; SUBCUTANEOUS at 12:34

## 2019-12-14 RX ADMIN — Medication 5 ML: at 22:46

## 2019-12-14 RX ADMIN — DOCUSATE SODIUM 100 MG: 100 CAPSULE, LIQUID FILLED ORAL at 08:18

## 2019-12-14 NOTE — PROGRESS NOTES
PHYSICAL THERAPY EVALUATION AND DISCHARGE    Patient: Humble Cho (23 y.o. female)  Date: 12/14/2019  Primary Diagnosis: Elevated troponin [R79.89]  Dyspnea [R06.00]        Precautions: Skin  PLOF: Patient has been paraplegic for several years and has power WC, sliding board, drop arm BSC at home. Patient is independent with mobility and performs I/ADL's as much as able. ASSESSMENT :  Patient is 69yo F admitted to hospital for dyspnea and elevated troponin. Patient presents today alert and agreeable to therapy and performed transfer bed to chair with bariatric drop arm BSC. Patient reports BSC in her room was difficult to use as it did not sit flush to the bed as her commode does at home; obtained needed [facility] and patient able to manage controls on hospital bed and transfer bed <> BSC. Patient then performed rolling and was left resting with call bell by her side. Encouraged patient to continue transfer to [facility] and change positions daily. Patient agreeable to D/C from PT at this level of care and does not require further skilled intervention at this level of care. PLAN :  Recommendations and Planned Interventions:   No formal PT needs identified at this time. Discharge Recommendations: None  Further Equipment Recommendations for Discharge: bariatric drop arm BSC (addended 12/16/19 as patient had previously said he had a drop arm commode at home; according to CM patient is reporting she does not have one at home)     SUBJECTIVE:   Patient stated I need a bedside commode that's wider for weight shifting so I can use it here in the hospital. Obtained [facility] for patient to use in room.      OBJECTIVE DATA SUMMARY:     Past Medical History:   Diagnosis Date    Acute paraplegia ([address] 4/20/2017    Benign hypertensive heart disease with systolic CHF, NYHA class 2 ([address] 9/5/2012    Biventricular implantable cardioverter-defibrillator in situ 04/28/2005    Upgraded to BiV AICD; gen change 4/2008; pocket revision 10/2009; Abdominal - done on 8/22/2012 by Dr. Steve Douglas Cardiac cath 08/15/1996    Patent coronaries. Elev LVEDP. EF 50-55%.  Cardiac echocardiogram 06/23/2015    Ltd study. EF 45-50%. Mild, diffuse hypk. Severe apical hypk. No mass or thrombus was clearly identified, although imaging was suboptimal.      Cardiac nuclear imaging test 06/19/2015    Fixed distal apical, distal septal defect more likely due to RV pacing than prior infarct. No ischemia. EF 46%. RWMA c/w RV pacing. Nondiagnostic EKG on pharm stress test.      Cardiovascular lower extremity venous duplex 09/04/2012    Acute, non-occlusive DVT in CFV on right. No DVT on left. No superficial thrombosis bilaterally.  Cardiovascular upper extremity venous duplex 08/27/2012    DVT in axillary vein on left. Left subclavian was not visualized.     Chronic anemia 9/5/2012    Chronic systolic heart failure (HCC)     Decreased calculated glomerular filtration rate (GFR) 3/30/2017    Calculated GFR equivalent to that of CKD stage 3 = 30-59 ml/min    Diabetic neuropathy associated with type 2 diabetes mellitus ([address] 6/28/2011    Difficult airway for intubation 08/22/2012    see anesthesia airway note    Dyslipidemia 6/28/2011    Gout     History of complete heart block 6/28/2011    History of Coumadin therapy     Anticoagulation for DVT of the LUE; Discontinued on 3/30/2017    History of deep venous thrombosis 9/5/2012    Left upper extremity    History of pyelonephritis 3/30/2017    Left bundle branch block (LBBB) on electrocardiogram 6/28/2011    Nonischemic cardiomyopathy ([address] 6/28/2011    Obesity (BMI 35.0-39.9 without comorbidity) 3/13/2017    Obstructive sleep apnea on CPAP 2/7/2012    Psoas abscess, right ([address] 4/20/2017    Psoas hematoma, right, secondary to anticoagulant therapy 3/30/2017    Type 2 diabetes mellitus with diabetic neuropathy ([address] 6/28/2011     Past Surgical History:   Procedure Laterality Date  HX CARPAL TUNNEL RELEASE  4/07    right     HX CHOLECYSTECTOMY  1994    HX HYSTERECTOMY  1973    HX OTHER SURGICAL  6/11/2012    AICD revision    HX PACEMAKER  4/28/2005    [employer] AICD    KS REMVL PERM PM PLS GEN W/REPL PLSE GEN 2 LEAD SYS N/A 2/26/2019    REMOVE & REPLACE PPM GEN DUAL LEAD performed by Renée Lobo MD at [facility]     Barriers to Learning/Limitations: None  Compensate with: N/A  Home Situation:   Home Situation  Home Environment: Private residence  One/Two Story Residence: Two story  Living Alone: No  Support Systems: Family member(s), Child(lea)  Patient Expects to be Discharged to[de-identified] Private residence  Current DME Used/Available at Home: Wheelchair, Commode, bedside  Critical Behavior:    A&Ox4  Strength:    Strength: Generally decreased, functional(BLE)   Tone & Sensation:   Tone: Normal(BLE)   Range Of Motion:  AROM: Generally decreased, functional(BLE)    PROM: Generally decreased, functional(BLE)   Functional Mobility:  Bed Mobility:   Supine to Sit: Modified independent  Sit to Supine: Modified independent  Scooting: Modified independent  Transfers:   Lateral Transfers: Modified independent      Balance:   Sitting: Intact  Pain:  Pain level pre-treatment: 0/10   Pain level post-treatment: 0/10    Activity Tolerance:   Patient tolerated activity well and was left resting with call bell by her side. Please refer to the flowsheet for vital signs taken during this treatment. After treatment:   []         Patient left in no apparent distress sitting up in chair  [x]         Patient left in no apparent distress in bed  [x]         Call bell left within reach  []         Nursing notified  [x]         Caregiver present  []         Bed alarm activated  []         SCDs applied    COMMUNICATION/EDUCATION:   [x]         Role of Physical Therapy in the acute care setting.   [x]         Fall prevention education was provided and the patient/caregiver indicated understanding. [x]         Patient/family have participated as able in goal setting and plan of care. [x]         Patient/family agree to work toward stated goals and plan of care. []         Patient understands intent and goals of therapy, but is neutral about his/her participation. []         Patient is unable to participate in goal setting/plan of care: ongoing with therapy staff.  []         Other:     Thank you for this referral.  Angeles Garnett, PT   Time Calculation: 23 mins      Eval Complexity: History: LOW Complexity : Zero comorbidities / personal factors that will impact the outcome / POCExam:LOW Complexity : 1-2 Standardized tests and measures addressing body structure, function, activity limitation and / or participation in recreation  Presentation: LOW Complexity : Stable, uncomplicated  Clinical Decision Making:Low Complexity    Overall Complexity:LOW

## 2019-12-14 NOTE — ROUTINE PROCESS
Report received from Cyrus . Patient is alert, in bed. Family is at the bedside. No distress noted. Call bell in reach. 0030  Patient is in bed, meds given, no distress noted.

## 2019-12-14 NOTE — PROGRESS NOTES
Progress Note         Patient: Chikis Martinez MRN: [id number]  CSN: [id number]    YOB: 1950  Age: 71 y.o. Sex: female    DOA: 12/12/2019 LOS:  LOS: 2 days                    Subjective:     Chikis Martinez is a 71 y.o. female with a PMHx of systolic CHF w/PPM, paraplegia s/p ruptured hematoma in L spine, HTN, Type II DM, chronic R heel ulcer and suprapubic cathter who is admitted for acute on chronic systolic CHF. Comfortable in bed   Breathing improved, not at baseline  Worked with PT today and was able to get up with assistance       Objective:     Physical Exam:  Visit Vitals  /67 (BP 1 Location: Right arm, BP Patient Position: At rest)   Pulse 76   Temp 97.3 °F (36.3 °C)   Resp 22   Ht 5' 7\" (1.702 m)   Wt 107.9 kg (237 lb 12.8 oz)   SpO2 100%   Breastfeeding No   BMI 37.24 kg/m²        General:         Alert, cooperative, no acute distress    HEENT: NC, Atraumatic. Anicteric sclerae. Lungs: Diminished sound with rales in bases   Heart:  Regular  rhythm,  No murmur, No Rubs, No Gallops  Abdomen: Soft, Non distended, Non tender. +Bowel sounds, + suprapubic catheter   Extremities: 1+ BLE edema to the mid-shin, R heel with clean, dry bandage in place    Psych:   Good insight. Not anxious or agitated  Neurologic:  Alert and oriented X 3; 5/5 strength X 4 extremities     Intake and Output:  Current Shift:  No intake/output data recorded.   Last three shifts:  12/12 1901 - 12/14 0700  In: 600 [P.O.:600]  Out: 1325 [Urine:1325]    Labs: Results:       Chemistry Recent Labs     12/14/19  0144 12/13/19  0518 12/12/19  1602   * 109* 126*    141 143   K 4.2 3.9 4.5    106 107   CO2 29 29 29   BUN 52* 35* 30*   CREA 2.34* 1.68* 1.51*   CA 9.1 9.4 10.1   AGAP 5 6 7   BUCR 22* 21* 20   AP  --  105 126*   TP  --  7.0 8.1   ALB  --  3.3* 4.1   GLOB  --  3.7 4.0   AGRAT  --  0.9 1.0      CBC w/Diff Recent Labs     12/14/19  0144 12/13/19  0518 12/12/19  1602   WBC 5.1 6.2 8.0   RBC 4. 27 4.40 5.15   HGB 11.8* 12.4 14.7   HCT 35.1 35.9 41.8    178 224   GRANS 44 52 66   LYMPH 41 35 24   EOS 4 2 2      Cardiac Enzymes Recent Labs     12/13/19  0518 12/13/19  0010   * 227*   CKND1 1.7 2.0      Coagulation Recent Labs     12/12/19  1602   PTP 12.4   INR 0.9   APTT 26.1       Lipid Panel Lab Results   Component Value Date/Time    Cholesterol, total 127 08/17/2018 02:40 AM    HDL Cholesterol 42 08/17/2018 02:40 AM    LDL, calculated 61.6 08/17/2018 02:40 AM    VLDL, calculated 23.4 08/17/2018 02:40 AM    Triglyceride 117 08/17/2018 02:40 AM    CHOL/HDL Ratio 3.0 08/17/2018 02:40 AM      BNP No results for input(s): BNPP in the last 72 hours. Liver Enzymes Recent Labs     12/13/19  0518   TP 7.0   ALB 3.3*      SGOT 32      Thyroid Studies Lab Results   Component Value Date/Time    TSH 0.29 (L) 12/14/2019 12:55 PM                Assessment and Plan:     Janyth Kocher is a 71 y.o. female with a PMHx of systolic CHF w/PPM, paraplegia s/p ruptured hematoma in L spine, HTN, Type II DM, chronic R heel ulcer and suprapubic cathter who is admitted for acute on chronic systolic CHF. Condition is improved but not baseline. 1. Acute on chronic systolic CHF- Echo 32/92 showed EF 20-25%  2. LISBETH on CKD Stage III   3. HTN   4. Type II DM- HbA1c 5.9 12/12/19   5. Hypothyroidism   6. Hx suprapubic catheter- exchanged 12/12 by urology   7. Hx paraplegia  8. Chronic back spams   9.  Itching     Nephrology consulted, appreciate recommendations   Cardiology consulted, appreciate recommendations   Cont IV lasix   Monitor BMP, CBC  Hold Entresto for now   Cont home meds: carvedilol   Cont home lantus, SSI w/accuchecks   Cont home meds: gabapentin, armour   Flexeril prn, hydroxyzine prn, zofran prn   PT, OT    PT recommends HH       Case discussed with:  [x]Patient  [x]Family  [x]Nursing  [x]Case Management  DVT prophylaxis: SQH   Diet: Cardiac   Code Status: FULL   Disposition: Cont care in tele, >2 midnights       COURT Weber,   12/14/2019

## 2019-12-14 NOTE — PROGRESS NOTES
[facility]  -  Progress Note      Patient: Roya Granados MRN: [id number]  SSN: [id number]    YOB: 1950  Age: 71 y.o. Sex: female      Admit Date: 12/12/2019    Assessment:   -Acute on chronic systolic heart failure. Symptomatically improving with IV lasix. Ejection fraction remains severely depressed, EF 20 to 25% on repeat echocardiogram this admission.  -Chronic kidney disease. Creatinine worsening since last month, but appears creatinine 1.4-1.6 over the previous few months. Increased creatinine noted. -Severe nonischemic cardiomyopathy. EF 26-30% on echo 5/2019. widely patent coronaries 8/1996. Nuclear stress 6/2015 without ischemia. -Biventricular PPM 10/2012 (in abdomen) with generator change out 2/2019.  She previously had a biventricular ICD but it was removed due to infection at the site. Normal device function on interrogation, no significant arrhythmias.  -Hypertension. Low normal BP.  -H/o suprapubic catheter. Recent change out. Increased tenderness suprapubic area. -H/o recurrent infections.  -Paraplegia. Fall 4/2017 resulting in trauma to her back developing an infected right psoas hematoma with development of an epidural abscess with neurological compromise with resulting paraplegia.     Primary cardiologist Dr. Kevin Fischer. Plan:     Would continue IV Lasix as renal function will tolerate. I would also like to check a thyroid level. Patient may benefit from nephrology input. Agree with holding Entresto for now due to low blood pressure. Subjective:     Patient still short of breath with any physical activity.     Objective:      Patient Vitals for the past 8 hrs:   Temp Pulse Resp BP SpO2   12/14/19 1054 97.3 °F (36.3 °C) 76 22 112/67 100 %   12/14/19 0716 97.4 °F (36.3 °C) 80 18 103/64 97 %         Patient Vitals for the past 96 hrs:   Weight   12/14/19 0401 107.9 kg (237 lb 12.8 oz)   12/13/19 1350 108 kg (238 lb)   12/13/19 0430 108 kg (238 lb) 12/12/19 2236 108 kg (238 lb)         Intake/Output Summary (Last 24 hours) at 12/14/2019 1320  Last data filed at 12/14/2019 0658  Gross per 24 hour   Intake 240 ml   Output 400 ml   Net -160 ml       Physical Exam:  General:  alert, cooperative, no distress, appears older than stated age  Neck:  no JVD  Lungs:  diminished breath sounds R base, L base  Heart:  regular rate and rhythm  Abdomen:  abdomen is soft without significant tenderness, masses, organomegaly or guarding  Extremities:  edema 1+ bilateral lower extremity to the shins    Data Review:     Labs: Results:       Chemistry Recent Labs     12/14/19  0144 12/13/19  0518 12/12/19  1602   * 109* 126*    141 143   K 4.2 3.9 4.5    106 107   CO2 29 29 29   BUN 52* 35* 30*   CREA 2.34* 1.68* 1.51*   CA 9.1 9.4 10.1   MG  --  2.2 2.2   PHOS  --  4.1  --    AGAP 5 6 7   BUCR 22* 21* 20   AP  --  105 126*   TP  --  7.0 8.1   ALB  --  3.3* 4.1   GLOB  --  3.7 4.0   AGRAT  --  0.9 1.0      CBC w/Diff Recent Labs     12/14/19  0144 12/13/19  0518 12/12/19  1602   WBC 5.1 6.2 8.0   RBC 4.27 4.40 5.15   HGB 11.8* 12.4 14.7   HCT 35.1 35.9 41.8    178 224   GRANS 44 52 66   LYMPH 41 35 24   EOS 4 2 2      Cardiac Enzymes No results found for: CPK, CK, CKMMB, CKMB, RCK3, CKMBT, CKNDX, CKND1, CARLI, TROPT, TROIQ, AMBERLY, TROPT, TNIPOC, BNP, BNPP   Coagulation Recent Labs     12/12/19  1602   PTP 12.4   INR 0.9   APTT 26.1       Lipid Panel Lab Results   Component Value Date/Time    Cholesterol, total 127 08/17/2018 02:40 AM    HDL Cholesterol 42 08/17/2018 02:40 AM    LDL, calculated 61.6 08/17/2018 02:40 AM    VLDL, calculated 23.4 08/17/2018 02:40 AM    Triglyceride 117 08/17/2018 02:40 AM    CHOL/HDL Ratio 3.0 08/17/2018 02:40 AM      BNP Lab Results   Component Value Date/Time    B-type Natriuretic Peptide 111.9 (H) 11/19/2013 10:17 AM      Liver Enzymes Recent Labs     12/13/19  0518   TP 7.0   ALB 3.3*      SGOT 32      Digoxin Thyroid Studies Lab Results   Component Value Date/Time    TSH 0.51 04/20/2017 07:26 PM

## 2019-12-14 NOTE — CONSULTS
Consult Note  Consult requested by: dr Taylor Pate is a 71 y.o. female [address] who is being seen on consult for renal failure  Chief Complaint   Patient presents with    Shortness of Breath    Anxiety     Admission diagnosis: Systolic CHF, acute on chronic (HCC)     HPI: 71 y o  [place] female admitted with sob. she has hx of crf stage 3,chronic suprapubic cath due to urinary retention from paraplegia,hx of cardiomyopathy,chf,dvt,dm,sleep apnea. asked to see today because of increasing creatinine,had been receiving iv lasix since admission,enteresto was held,not taking any nephrotoxic meds  Past Medical History:   Diagnosis Date    Acute paraplegia ([facility] 4/20/2017    Benign hypertensive heart disease with systolic CHF, NYHA class 2 ([facility] [address] 9/5/2012    Biventricular implantable cardioverter-defibrillator in situ 04/28/2005    Upgraded to BiV AICD; gen change 4/2008; pocket revision 10/2009; Abdominal - done on 8/22/2012 by Dr. Fransisco Tejada Cardiac cath 08/15/1996    Patent coronaries. Elev LVEDP. EF 50-55%.  Cardiac echocardiogram 06/23/2015    Ltd study. EF 45-50%. Mild, diffuse hypk. Severe apical hypk. No mass or thrombus was clearly identified, although imaging was suboptimal.      Cardiac nuclear imaging test 06/19/2015    Fixed distal apical, distal septal defect more likely due to RV pacing than prior infarct. No ischemia. EF 46%. RWMA c/w RV pacing. Nondiagnostic EKG on pharm stress test.      Cardiovascular lower extremity venous duplex 09/04/2012    Acute, non-occlusive DVT in CFV on right. No DVT on left. No superficial thrombosis bilaterally.  Cardiovascular upper extremity venous duplex 08/27/2012    DVT in axillary vein on left. Left subclavian was not visualized.     Chronic anemia 9/5/2012    Chronic systolic heart failure (HCC)     Decreased calculated glomerular filtration rate (GFR) 3/30/2017 Calculated GFR equivalent to that of CKD stage 3 = 30-59 ml/min    Diabetic neuropathy associated with type 2 diabetes mellitus ([facility] [address] 6/28/2011    Difficult airway for intubation 08/22/2012    see anesthesia airway note    Dyslipidemia 6/28/2011    Gout     History of complete heart block 6/28/2011    History of Coumadin therapy     Anticoagulation for DVT of the LUE; Discontinued on 3/30/2017    History of deep venous thrombosis 9/5/2012    Left upper extremity    History of pyelonephritis 3/30/2017    Left bundle branch block (LBBB) on electrocardiogram 6/28/2011    Nonischemic cardiomyopathy ([facility] [address] 6/28/2011    Obesity (BMI 35.0-39.9 without comorbidity) 3/13/2017    Obstructive sleep apnea on CPAP 2/7/2012    Psoas abscess, right ([address] 4/20/2017    Psoas hematoma, right, secondary to anticoagulant therapy 3/30/2017    Type 2 diabetes mellitus with diabetic neuropathy ([facility] [address] 6/28/2011      Past Surgical History:   Procedure Laterality Date    HX CARPAL TUNNEL RELEASE  4/07    right     HX CHOLECYSTECTOMY  1994    HX HYSTERECTOMY  1973    HX OTHER SURGICAL  6/11/2012    AICD revision    HX PACEMAKER  4/28/2005    [employer] AICD    SC REMVL PERM PM PLS GEN W/REPL PLSE GEN 2 LEAD SYS N/A 2/26/2019    REMOVE & REPLACE PPM GEN DUAL LEAD performed by Neal Albrecht MD at [facility]       Social History     Socioeconomic History    Marital status:      Spouse name: Not on file    Number of children: Not on file    Years of education: Not on file    Highest education level: Not on file   Occupational History    Not on file   Social Needs    Financial resource strain: Not on file    Food insecurity:     Worry: Not on file     Inability: Not on file    Transportation needs:     Medical: Not on file     Non-medical: Not on file   Tobacco Use    Smoking status: Never Smoker    Smokeless tobacco: Never Used   Substance and Sexual Activity    Alcohol use: Not Currently    Drug use: No    Sexual activity: Not Currently     Partners: Male   Lifestyle    Physical activity:     Days per week: Not on file     Minutes per session: Not on file    Stress: Not on file   Relationships    Social connections:     Talks on phone: Not on file     Gets together: Not on file     Attends Yazidism service: Not on file     Active member of club or organization: Not on file     Attends meetings of clubs or organizations: Not on file     Relationship status: Not on file    Intimate partner violence:     Fear of current or ex partner: Not on file     Emotionally abused: Not on file     Physically abused: Not on file     Forced sexual activity: Not on file   Other Topics Concern    Not on file   Social History Narrative    Not on file       Family History   Problem Relation Age of Onset    Cancer Father         Leukemia     Allergies   Allergen Reactions    Latex Itching    Vancomycin Itching    Ampicillin Itching    Bactrim [Sulfamethoxazole-Trimethoprim] Itching    Blueberry Swelling     Causes throat swelling    Ciprofloxacin Itching    Codeine Other (comments)     Jumpy feeling    Crestor [Rosuvastatin] Itching    Darvocet A500 [Propoxyphene N-Acetaminophen] Itching    Demerol [Meperidine] Itching    Levaquin [Levofloxacin] Itching    Lipitor [Atorvastatin] Myalgia    Magnesium Oxide Itching     nausea    Minocin [Minocycline] Itching    Pcn [Penicillins] Itching    Pravachol [Pravastatin] Swelling     Swelling in mouth. Not allergic per patient, takes at home    Shellfish Derived Swelling     itching    Sulfa (Sulfonamide Antibiotics) Itching    Ultracet [Tramadol-Acetaminophen] Itching    Vicodin [Hydrocodone-Acetaminophen] Itching    Vytorin 10-10 [Ezetimibe-Simvastatin] Myalgia    Percodan [Oxycodone Hcl-Oxycodone-Asa] Itching        Home Medications:     Prior to Admission Medications   Prescriptions Last Dose Informant Patient Reported? Taking?    ARMOUR THYROID 30 mg tablet 2019 at Unknown time  Yes Yes   Sig: Take 1 Tab by mouth Daily (before breakfast). ARMOUR THYROID 60 mg tablet Not Taking at Unknown time  Yes No   Sig: Take 1 Tab by mouth Daily (before breakfast). carvedilol (COREG) 6.25 mg tablet 2019 at Unknown time  No Yes   Sig: Take 1 Tab by mouth two (2) times a day. cholecalciferol, VITAMIN D3, (VITAMIN D3) 5,000 unit tab tablet 2019 at Unknown time  Yes Yes   Sig: Take  by mouth daily. exenatide microspheres (BYDUREON) 2 mg serr 2019  Yes No   Si mg by SubCUTAneous route every seven (7) days. folic acid (FOLVITE) 1 mg tablet Not Taking at Unknown time  No No   Sig: Take 1 Tab by mouth daily. furosemide (LASIX) 40 mg tablet 2019 at Unknown time  No Yes   Sig: Take 1 Tab by mouth daily. Patient taking differently: Take 60 mg by mouth daily. gabapentin (NEURONTIN) 300 mg capsule 2019 at Unknown time  No Yes   Sig: Take 2 Caps by mouth two (2) times a day. Indications: NEUROPATHIC PAIN   insulin glargine (LANTUS) 100 unit/mL injection 2019 at Unknown time  No Yes   Si Units by SubCUTAneous route nightly. Indications: type 2 diabetes mellitus   Patient taking differently: 10 Units by SubCUTAneous route nightly. insulin lispro (HUMALOG) 100 unit/mL injection   No No   Sig: See sliding scale   Patient taking differently: 4 Units Before breakfast, lunch, and dinner. See sliding scale   metFORMIN ER (GLUCOPHAGE XR) 500 mg tablet 2019 at Unknown time  Yes Yes   Sig: Take 500 mg by mouth daily. multivitamin (ONE A DAY) tablet 2019 at Unknown time  Yes Yes   Sig: Take 1 Tab by mouth daily. sacubitril-valsartan (ENTRESTO) 97 mg/103 mg tablet 2019 at Unknown time  No Yes   Sig: Take 1 Tab by mouth two (2) times a day.       Facility-Administered Medications: None       Current Facility-Administered Medications   Medication Dose Route Frequency    [START ON 12/15/2019] furosemide (LASIX) injection 40 mg  40 mg IntraVENous DAILY    hydrOXYzine pamoate (VISTARIL) capsule 50 mg  50 mg Oral QHS PRN    dextrose 10% infusion 125-250 mL  125-250 mL IntraVENous PRN    cyclobenzaprine (FLEXERIL) tablet 5 mg  5 mg Oral TID PRN    hydrOXYzine pamoate (VISTARIL) capsule 25 mg  25 mg Oral TID PRN    insulin glargine (LANTUS) injection 10 Units  10 Units SubCUTAneous QHS    insulin lispro (HUMALOG) injection   SubCUTAneous AC&HS    sodium chloride (NS) flush 5-40 mL  5-40 mL IntraVENous Q8H    sodium chloride (NS) flush 5-40 mL  5-40 mL IntraVENous PRN    diphenhydrAMINE (BENADRYL) injection 25 mg  25 mg IntraVENous Q4H PRN    ondansetron (ZOFRAN) injection 4 mg  4 mg IntraVENous Q4H PRN    docusate sodium (COLACE) capsule 100 mg  100 mg Oral BID    heparin (porcine) injection 5,000 Units  5,000 Units SubCUTAneous Q8H    thyroid (Pork) (ARMOUR) tablet 30 mg  30 mg Oral 6am    carvedilol (COREG) tablet 6.25 mg  6.25 mg Oral BID    gabapentin (NEURONTIN) capsule 600 mg  600 mg Oral BID    [Held by provider] sacubitril-valsartan (ENTRESTO)  mg tablet 1 Tab  1 Tab Oral BID       Review of Systems:   A comprehensive review of systems was negative except for that written in the HPI.   Data Review:    Labs: Results:       Chemistry Recent Labs     12/14/19  0144 12/13/19  0518 12/12/19  1602   * 109* 126*    141 143   K 4.2 3.9 4.5    106 107   CO2 29 29 29   BUN 52* 35* 30*   CREA 2.34* 1.68* 1.51*   CA 9.1 9.4 10.1   AGAP 5 6 7   BUCR 22* 21* 20   AP  --  105 126*   TP  --  7.0 8.1   ALB  --  3.3* 4.1   GLOB  --  3.7 4.0   AGRAT  --  0.9 1.0      PTH  Lab Results   Component Value Date/Time    Calcium 9.1 12/14/2019 01:44 AM    CALCIUM,IONIZED 4.90 10/27/2017 02:26 PM    Phosphorus 4.1 12/13/2019 05:18 AM    PTH, Intact 339.5 (H) 08/23/2012 12:25 AM      CBC w/Diff Recent Labs     12/14/19  0144 12/13/19  0518 12/12/19  1602   WBC 5.1 6.2 8.0   RBC 4.27 4.40 5.15   HGB 11.8* 12.4 14.7   HCT 35.1 35.9 41.8    178 224   GRANS 44 52 66   LYMPH 41 35 24   EOS 4 2 2      Coagulation Recent Labs     12/12/19  1602   PTP 12.4   INR 0.9   APTT 26.1       Iron/Ferritin No results for input(s): IRON in the last 72 hours. No lab exists for component: TIBCCALC   BNP No results for input(s): BNPP in the last 72 hours. Cardiac Enzymes Recent Labs     12/13/19  0518 12/13/19  0010   * 227*   CKND1 1.7 2.0      Liver Enzymes Recent Labs     12/13/19 0518   TP 7.0   ALB 3.3*      SGOT 32      Thyroid Studies Lab Results   Component Value Date/Time    TSH 0.51 04/20/2017 07:26 PM        Urinalysis Lab Results   Component Value Date/Time    Color YELLOW 12/12/2019 04:02 PM    Appearance CLEAR 12/12/2019 04:02 PM    Specific gravity 1.006 12/12/2019 04:02 PM    pH (UA) 7.5 12/12/2019 04:02 PM    Protein 30 (A) 12/12/2019 04:02 PM    Glucose NEGATIVE  12/12/2019 04:02 PM    Ketone NEGATIVE  12/12/2019 04:02 PM    Bilirubin NEGATIVE  12/12/2019 04:02 PM    Urobilinogen 0.2 12/12/2019 04:02 PM    Nitrites NEGATIVE  12/12/2019 04:02 PM    Leukocyte Esterase TRACE (A) 12/12/2019 04:02 PM    Epithelial cells FEW 12/12/2019 04:02 PM    Bacteria FEW (A) 12/12/2019 04:02 PM    WBC 0 to 2 12/12/2019 04:02 PM    RBC 3 to 10 12/12/2019 04:02 PM         IMAGES:   XR Results (maximum last 3): Results from Hospital Encounter encounter on 12/12/19   XR CHEST SNGL V    Narrative PORTABLE CHEST    HISTORY: Dyspnea, palpitations    COMPARISON: 3/20/2019    FINDINGS: No evidence of pneumonia or acute pulmonary infiltrate. Lung volumes  are satisfactory and there is no evidence of a pleural effusion or pneumothorax. Cardiac size is borderline prominent. Pulmonary markings are within normal  limits. The patient is status post sternotomy. There are several pacing wires  projecting over the cardiac silhouette; unchanged since the prior study.       Impression IMPRESSION: No evidence of acute cardiopulmonary disease. Mild cardiomegaly. No  significant interval changes. Results from [place] encounter on 02/20/19   XR CHEST PA LAT    Narrative CHEST PA AND LATERAL:    CPT CODE: 11512    COMPARISON: 12/21/2018    INDICATION: Preop. Multiple prior cardiac procedures. FINDINGS: Status post median sternotomy. Transthoracic pacing wires are present. The heart is enlarged. Lungs are clear with no infiltrates. I see no evidence of  pneumonia or heart failure. No pleural effusions are seen. Inferior vena cava  filter present. Impression Impression:    Cardiomegaly with no acute cardiopulmonary abnormalities. No significant change  since 12/21/2018. Results from [place] encounter on 12/27/18   XR FOOT RT AP/LAT    Narrative EXAM:  Right foot series    CLINICAL INDICATION:  Infection to the right foot     COMPARISON:  12/21/18      TECHNIQUE:  Frontal and lateral views of the right foot    FINDINGS:  A large soft tissue defect is noted at the posterior heel region,  with unusually foreshortened appearance of the calcaneus and markedly pronounced  sclerotic changes to the calcaneus. Possible subtle cortical resorption in the  inferior aspect of the calcaneus. There is diffuse bony mineralization. Impression IMPRESSION:      1. Soft tissue defect in the posterior heel region. 2.  Foreshortened calcaneus with sharply marginated posterior calcaneal region. Query recent surgical intervention. Possible subtle cortical defect at the  inferior margin. Developing infectious process cannot be excluded. CT Results (maximum last 3): Results from [place] encounter on 12/27/18   CT ABD PELV W CONT    Narrative CT ABDOMEN AND PELVIS WITH ENHANCEMENT    INDICATION: Paraplegic with Admitted with right foot infection suspicious for  osteomyelitis with dysphagia, nausea, abdominal pain.     TECHNIQUE: Axial images obtained of the abdomen and pelvis following the  uneventful administration of  70 cc's Isovue-300 nonionic intravenous contrast.   Coronal and sagittal reformatted images of the abdomen and pelvis were obtained. All CT scans at this facility are performed using dose optimization technique as  appropriate to a performed exam, to include automated exposure control,  adjustment of the mA and/or kV according to patient size (including appropriate  matching first site-specific examinations), or use of iterative reconstruction  technique. COMPARISON: 6/8/2017; CT chest 9/4/2018. ABDOMEN FINDINGS:     Liver: Unremarkable. Spleen: Similar mildly enlarged measuring 13.2 cm in AP diameter. Pancreas: Unremarkable. Biliary: Cholecystectomy. No biliary ductal dilation. Bowel: Moderate colonic stool burden in the colon and rectum. Normal appendix. Peritoneum/ Retroperitoneum: The prior right psoas enlargement and abscess have  resolved. No ascites or free fluid. Lymph Nodes: Unremarkable. Adrenal Glands: Unremarkable. Kidneys: Cortical scarring right greater than left. There is a similar sized 1.5  cm exophytic lesion at the right upper renal pole with density greater than  water since at least 6/8/2017. Size is reassuring for benign or indolent lesion. There is associated cortical scarring. Similar 6 cm exophytic left lower pole  renal cyst. Similar to small to characterize right interpolar renal lesion. Vessels: Mild atherosclerosis. Infrarenal IVC filter in place. IVC and renal  veins are flattened. PELVIS FINDINGS:     Bladder/ Pelvic Organs: Bladder is decompressed with Krueger in place. Status post  hysterectomy. No suspicious adnexal lesions. No free fluid in the pelvis. Lung Base: Status post CABG. Epicardial pacer leads. Left anterior abdominal  wall cardiac device. Similar region of subsegmental atelectasis or scarring at  the right greater than left lung bases. There is associated mild bronchiectasis.     Bones: Sarcopenia compatible with history of paraplegia. Incompletely visualized  median sternotomy. Lumbar facet hypertrophy. Osteopenia. There is a linear 3.8 x  1.6 cm soft tissue density within the soft tissues overlying the left  coccyx/medial gluteal fold (91). No fluid density. Underlying cortex is intact. Degenerative changes left greater than right hips. Degenerative disc disease. Impression IMPRESSION:    1. Moderate colonic stool burden; correlate for constipation. 2.  Indeterminate new superficial soft tissue density near the coccyx/left  medial gluteal fold without fluid density. This could represent phlegmon or  scarring. No underlying findings of osteomyelitis. 3.  Resolved right psoas abscess. 4.  Hypovolemia. 5.  Similar mild splenomegaly. 6.  Lung base bronchiectasis, likely sequela of prior infection. Results from [place] encounter on 09/04/18   CTA CHEST W OR W WO CONT    Narrative EXAMINATION: CTA chest pulmonary    INDICATION: Acute chest pain, shortness of breath    COMPARISON: None    TECHNIQUE: CT angiography of the pulmonary arteries performed following 78 cc IV  Isovue 370 with 3-D MIP multiplanar reformations. All CT scans at this facility  are performed using dose optimization technique as appropriate to a performed  exam, to include automated exposure control, adjustment of the mA and/or kV  according to patient size (including appropriate matching first site specific  examinations), or use of iterative reconstruction technique. FINDINGS:    Evaluation limited by streak artifact from arms at side. Motion artifact further  limits evaluation. Cardiovascular: No pulmonary arterial filling defects identified to suggest  pulmonary embolus. Smaller segmental and subsegmental arteries not as well  evaluated. Thoracic aorta is normal in course and caliber. Heart size within  normal limits. Post median sternotomy. Epicardial leads noted from inferior  approach.     Mediastinum: Enlarged heterogeneous thyroid. No mediastinal adenopathy by size  criteria. Small hiatal hernia. Pleura: No effusions or pneumothorax. Lungs/airways: Motion limits evaluation. Bandlike consolidation in the right  posterior lower lobe, likely atelectasis/scar, and linear atelectasis/scar in  the left lower lobe. Upper abdomen: Status post cholecystectomy. Spleen is slightly prominent in  size. Small hiatal hernia. Probable bilateral renal cysts and right renal  atrophy and scarring, incompletely evaluated. Miscellaneous: Superficial soft tissues unremarkable. Bones: No acute osseous findings. Impression IMPRESSION:    1. No pulmonary embolus identified. Smaller segmental and subsegmental arteries  not as well evaluated. See limitations above. 2. Bandlike consolidation in the right lower lobe likely atelectasis/scar. Additional suspected atelectasis or scar in the left lower lobe. 2. Enlarged heterogeneous thyroid.  -Small hiatal hernia. -Status post cholecystectomy and mildly prominent spleen.  -Bilateral probable renal cysts and right renal atrophy/scarring, incompletely  evaluated. Results from [place] encounter on 08/16/18   CT FOOT RT WO CONT    Narrative CT Lower extremity without contrast    Indications: Poor healing of wound at the heel of the right ankle since an  injury December 2017. Patient is diabetic. Concern for osteomyelitis. Technique: Contiguous 1.25 mm thickness axial images were obtained through the  right ankle/foot. Sagittal and coronal MPR generated. CT scans at this facility are performed using dose optimization technique as  appropriate with performed exam, to include automated exposure control,  adjustment of mA and/or kV according to patient's size (including appropriate  matching for site-specific examinations), or use of iterative reconstruction  technique.     Comparison: Plain films April and August 2018    Findings:     Posterior lateral heel ulcer with exposed calcaneal margin. There is erosion of  the subjacent calcaneus involving a more than 2 cm span. There is increased  sclerosis around this area within the posterior half of the calcaneus. No  abscess. There is generalized bone demineralization of the foot. No fracture. There is  moderately-sized plantar heel spur formation. Mild to moderate degenerative spurring of the ankle joint proper. There is no  collapse of the talar dome, but likely small degenerative subchondral cyst along  its medial margin. Some residual trabeculation within the talar dome, not a  clearly circumscribed region to suggest AVN. More moderate multifocal degenerative spurring of the midfoot at multiple  joints. No fracture or bone erosion. Moderately pronounced degenerative changes of the 1st MTP joint and sesamoids. There is prominent length of the medial sesamoid likely from ankylosis with  ossification of the plantar plate. There is the expected regional subcutaneous tissue edema that is most pronounced  at the posterior ankle region. The soft tissue ulceration does involve the  distal most medial aspect of the Achilles tendon. Fairly diffuse atrophy of intrinsic foot musculature as with diabetic  neuropathy. There is regional atherosclerosis. Impression IMPRESSION[de-identified]    1.  Right posterior medial heel ulcer with CT evidence of osteomyelitis at the  subjacent calcaneus. No abscess. Presumed involvement of the traversing segment  of distal most Achilles tendon. 2. Regional demineralization and other findings that more likely relates to  underlying neuropathy. 3. Moderate regional degenerative findings as well, as delineated above. Thank you for this referral.       MRI Results (maximum last 3): No results found for this or any previous visit. Nuclear Medicine Results (maximum last 3):   Results from Hospital Encounter encounter on 06/06/17   NM BONE SCAN TENISHA SPECT    Narrative EXAM: Nuclear medicine gallium scan of the abdomen and pelvis with SPECT CT    INDICATION: Concern for osteomyelitis of the lumbar spine and abscess of the  psoas. TECHNIQUE: Nuclear medicine Bone Scan with SPECT-CT followed by Gallium-67 scan  with SPECT-CT performed. Ga-67 images obtained 48 hours post injection. Patient  given 27.5 mCi Tc-99m MDP and 5.5 mCi Ga-67 Citrate by IV in the right  antecubital fossa. COMPARISON: CT abdomen pelvis dated 6/8/2017 and CT of the lumbar spine dated  4/19/2017    FINDINGS:   The bone scan demonstrates degenerative changes in the ankles and knees. There  is a focus of increased uptake in the right facet of L2-L3. Extensive  degenerative changes are noted at this level which have been present previously. However, Ga-67 images demonstrates intense uptake in this facet as well. This  may represent an inflammatory arthropathy or infection. No abnormal uptake  identified in the psoas musculature especially the right iliopsoas muscle  correspond with prior abscess. No additional foci of abnormal uptake identified  in the abdomen or pelvis on the gallium citrate exam.      Impression IMPRESSION:   1. Increased uptake on Tc-99m Bone Scan and Ga-67 Citrate scan in the right  L2-L3 facet. This may represent an inflamed facet or septic arthritis with  adjacent osteomyelitis. As this is adjacent the previously seen fluid  collection, infection should be considered. Correlation with ESR and CRP may be  helpful. If continued clinical concern, may consider CT guided aspiration. 2.  No evidence of psoas abscess or other intraabdominal fluid collection. NM INFLAM PROC TENISHA SPECT    Narrative EXAM: Nuclear medicine gallium scan of the abdomen and pelvis with SPECT CT    INDICATION: Concern for osteomyelitis of the lumbar spine and abscess of the  psoas. TECHNIQUE: Nuclear medicine Bone Scan with SPECT-CT followed by Gallium-67 scan  with SPECT-CT performed.  Ga-67 images obtained 48 hours post injection. Patient  given 27.5 mCi Tc-99m MDP and 5.5 mCi Ga-67 Citrate by IV in the right  antecubital fossa. COMPARISON: CT abdomen pelvis dated 6/8/2017 and CT of the lumbar spine dated  4/19/2017    FINDINGS:   The bone scan demonstrates degenerative changes in the ankles and knees. There  is a focus of increased uptake in the right facet of L2-L3. Extensive  degenerative changes are noted at this level which have been present previously. However, Ga-67 images demonstrates intense uptake in this facet as well. This  may represent an inflammatory arthropathy or infection. No abnormal uptake  identified in the psoas musculature especially the right iliopsoas muscle  correspond with prior abscess. No additional foci of abnormal uptake identified  in the abdomen or pelvis on the gallium citrate exam.      Impression IMPRESSION:   1. Increased uptake on Tc-99m Bone Scan and Ga-67 Citrate scan in the right  L2-L3 facet. This may represent an inflamed facet or septic arthritis with  adjacent osteomyelitis. As this is adjacent the previously seen fluid  collection, infection should be considered. Correlation with ESR and CRP may be  helpful. If continued clinical concern, may consider CT guided aspiration. 2.  No evidence of psoas abscess or other intraabdominal fluid collection. Results from [place] encounter on 04/20/17   NM LUNG PERFUSION W VENT    Narrative VQ SCAN     INDICATION: Shortness of breath and chest pain. History of blood clot in the  right arm. .    COMPARISON: Chest x-ray 5/10/2017. SITE OF INJECTION: Right sided PICC line. DESCRIPTION: After aerosolization of  32.0 mCi 99mTc-DTPA, which delivers approximately 1-2 mCi of radiopharmaceutical  to the lungs, ventilatory images of the lungs were obtained in multiple  projections.   After intravenous administration of 5.8 mCi 99mTc-MAA at the right  PICC line, perfusion images of the lungs were obtained in multiple projections. Images are correlated with chest radiographic study dated 5/10/2017. There is a matched defect at the bilateral lower lobes, more prominent on  ventilatory scan. This corresponds with the opacity seen on same day chest  x-ray. No evidence for moderate to large segmental mismatched perfusion defect. Impression IMPRESSION:    Low probability VQ scan for pulmonary embolism. US Results (maximum last 3): Results from [place] encounter on 04/20/17   US CHEST    Narrative Ultrasound of the chest    HISTORY: Pleural effusion. COMPARISON: Chest x-ray May 16, 2017. FINDINGS: Images of the right-sided chest was performed. There is no significant  pleural fluid present for safe thoracentesis. Thoracentesis was not performed. Impression IMPRESSION: Insufficient volume of right pleural effusion for thoracentesis. US ABD LTD    Narrative ULTRASOUND RIGHT UPPER QUADRANT    CPT CODE: 94688    HISTORY: Elevated LFT. FINDINGS: Realtime sonography of the right upper quadrant demonstrates a liver  of normal echogenicity without focal abnormalities. The gallbladder is  surgically absent. The biliary ducts are nondilated. Common bile duct  measurement is 10 mm; however, the measurement is somewhat suspect without  orthogonal image confirmation. The pancreas is incompletely seen but the  portion of the head and body imaged is unremarkable. Incidental examination of  the right kidney demonstrates significantly increased parenchymal echogenicity  as well as increased pelvic lipomatosis suggesting medical renal disease with an  overall renal length of 11.2 cm. No significant free fluid is seen. Impression IMPRESSION:    Cholecystectomy; possible prominence extrahepatic biliary ducts but not  intrahepatic biliary ducts. The liver is unremarkable. The right kidney has  increased parenchymal echogenicity suggesting medical renal disease.   The  pancreas is incompletely seen but the visualized portion of the head and body is  unremarkable. US ABD LTD    Narrative ULTRASOUND ABDOMEN-LIMITED    INDICATION: Evaluation for fluid collection/abscess at the site of pacemaker  pocket    COMPARISON: Correlation with CT abdomen/pelvis 4/19/2017    TECHNIQUE: Ultrasound evaluation was performed of the left upper quadrant  abdomen in the area of pacemaker. Selected static images are submitted for  review. FINDINGS:  In the area of concern, there is no evidence for focal fluid collection/abscess. Impression IMPRESSION:  As above. DEXA Results (maximum last 3): No results found for this or any previous visit. DIXON Results (maximum last 3):      IR Results (maximum last 3): Results from [place] encounter on 10/17/18   IR PICC INSERT WO PORT OVER 5 YEARS    Narrative DUAL LUMEN PICC    :  Francheska Braga    INDICATION:   Long-term venous access. Assistant: None    Complications: None    Estimated Blood loss: Minimal    TECHNIQUE: Risks of the procedure were explained and written informed consent  was obtained. Maximum sterile technique was followed including: Cap and mask, sterile gown,  sterile gloves, large sterile drape, hand hygiene, and 2% chlorhexidine for  cutaneous antisepsis or acceptable alternative antiseptics per current  guidelines. Ultrasound guidance: Ultrasound guidance was needed to localize a potential  vessel for access. Ultrasound probe was placed in a sterile sleeve. Sterile gel  was applied to the right upper arm. Ultrasound examination of the arm veins was  performed. The vein was noted to be compressible and patent. Image was saved to  patient's medical record. Under ultrasound guidance, access into the vein was  achieved using a micropuncture needle. Needle was seen entering the vain. Image  was saved to patient's medical record. Guidewire was inserted. Peel-away sheath was inserted.  The guidewire was able to  be advanced centrally. However catheter was not able to be advanced centrally. The 4 Maltese Bard power PICC line was then trimmed to 29 cm. Catheter tip was  positioned in the axillary vein. A spot radiograph of catheter tip position and US image of access vein were  obtained for documentation purposes. Radiation exposure:  Fluoroscopy 11 minutes min. 12 images were obtained. Disposition: [Patient was transfer back to nursing unit. Results from [place] encounter on 06/06/17   IR PICC INSERT WO PORT OVER 5 YEARS    Narrative PROCEDURE(S):  1. Peripherally inserted central venous catheter (PICC) insertion using  sonographic and fluoroscopic guidance    INDICATION: 42-year-old female. History of difficult intravenous access and/or  long-term intravenous therapy needed. PICC insertion requested. TECHNIQUE: Expected benefits, potential risks, and alternatives to the procedure  were discussed with the patient (and/or surrogate decision maker, as applicable)  and all questions were answered. Discussed risks include - but are not limited  to - bleeding, infection, vascular injury, arrhythmia, thromboembolic events,  device dysfunction, and medication reaction. Informed consent was obtained. The  patient was placed on the procedure table, the upper extremity was prepared in  usual sterile fashion, and the ensuing procedure was performed with full barrier  precaution, including caps, masks, gowns, gloves, and drapes. Procedure  verification was completed. Upon physician review, a patent vessel was not able to be identified. Consequently, ultrasound evaluation of potential access sites was performed. After successfully identifying a patent vessel, intravascular access was  achieved under real-time ultrasound guidance. The needle was visualized entering  the vessel. An image was permanently recorded and archived in PACS.  An 018  guidewire was introduced through the needle and advanced centrally under  fluoroscopic guidance. A dermal incision was made, the needle was removed, and a  peel-away sheath was placed. The catheter was trimmed to the desired length and  inserted through the peel-away sheath, which was removed as the catheter was  advanced to the cavoatrial junction. Blood freely aspirated and the catheter  readily flushed. The catheter was flushed, capped, secured in place, and  sterilely dressed. An image was obtained to document final position. The patient  tolerated the procedure well. ACCESS: Right basilic vein    CONTRAST: 10 mL    FLUOROSCOPY: 7.5 minutes; 27 images    MEDICATION(S): Local lidocaine    SEDATION TIME: None    COMPLICATION(S): None    ESTIMATED BLOOD LOSS: Minimal    BLOOD ADMINISTERED: None    SPECIMEN: None    IMPLANT(S): None    DRAIN(S): None    : Dagmar Silva MD    ASSISTANT(S): None    FINDINGS:  1. 5-Bulgarian double lumen PICC trimmed to 43 cm terminates at cavoatrial junction      Impression IMPRESSION:  Technically successful insertion of PICC, which is ready for immediate use,  including CT power injection. Results from [place] encounter on 04/20/17   IR PICC INSERT WO PORT OVER 5 YEARS    Narrative PROCEDURE(S):  1. Peripherally inserted central venous catheter (PICC) insertion using  sonographic and fluoroscopic guidance    INDICATION: 59-year-old female. History of difficult intravenous access and/or  long-term intravenous therapy needed. PICC insertion requested. TECHNIQUE: Expected benefits, potential risks, and alternatives to the procedure  were discussed with the patient (and/or surrogate decision maker, as applicable)  and all questions were answered. Discussed risks include - but are not limited  to - bleeding, infection, vascular injury, arrhythmia, thromboembolic events,  device dysfunction, and medication reaction. Informed consent was obtained.  The  patient was placed on the procedure table, the upper extremity was prepared in  usual sterile fashion, and the ensuing procedure was performed with full barrier  precaution, including caps, masks, gowns, gloves, and drapes. Procedure  verification was completed. Upon physician review, a patent vessel was not able to be identified. Consequently, ultrasound evaluation of potential access sites was performed. After successfully identifying a patent vessel, intravascular access was  achieved under real-time ultrasound guidance. The needle was visualized entering  the vessel. An image was permanently recorded and archived in PACS. An 018  guidewire was introduced through the needle and advanced centrally under  fluoroscopic guidance. A dermal incision was made, the needle was removed, and a  peel-away sheath was placed. The catheter was trimmed to the desired length and  inserted through the peel-away sheath, which was removed as the catheter was  advanced centrally. Blood freely aspirated and the catheter readily flushed. The  catheter was flushed, capped, secured in place, and sterilely dressed. An image  was obtained to document final position. The patient tolerated the procedure  well. ACCESS: Right basilic vein    CONTRAST: None    FLUOROSCOPY: 0.2 minutes; 1 image    MEDICATION(S): Local lidocaine    SEDATION TIME: None    COMPLICATION(S): None    ESTIMATED BLOOD LOSS: Minimal    BLOOD ADMINISTERED: None    SPECIMEN: None    IMPLANT(S): None    DRAIN(S): None    : Arline Angel MD    ASSISTANT(S): None    FINDINGS:  1. 5-Hungarian double lumen PICC trimmed to 27 cm terminates at subclavian vein      Impression IMPRESSION:  Technically successful insertion of PICC, which is ready for immediate use,  including CT power injection. VAS/US Results (maximum last 3):   Results from [place] encounter on 10/27/17   DUPLEX LOWER EXT VENOUS BILAT   Results from Hospital Encounter encounter on 04/20/17   DUPLEX LOWER EXT VENOUS BILAT   DUPLEX RENAL ART/ANÍBAL BILATERAL       PET Results (maximum last 3): No results found for this or any previous visit. No results found for this or any previous visit. @[id number]([id number])    CULTURE:   )  Recent Labs     12/12/19 2158 12/12/19  1730 12/12/19  1602   CULT FEW STAPHYLOCOCCUS SPECIES, COAGULASE NEGATIVE* NO GROWTH 2 DAYS >100,000 COLONIES/mL GRAM NEGATIVE RODS*  >100,000 COLONIES/mL 2ND GRAM NEGATIVE BRITANY*  >100,000  COLONIES/mL  3RD GRAM NEGATIVE BRITANY  *  NO GROWTH 2 DAYS     Recent Labs     12/12/19  2158 12/12/19  1730 12/12/19  1602   CULT FEW STAPHYLOCOCCUS SPECIES, COAGULASE NEGATIVE* NO GROWTH 2 DAYS >100,000 COLONIES/mL GRAM NEGATIVE RODS*  >100,000 COLONIES/mL 2ND GRAM NEGATIVE BRITANY*  >100,000  COLONIES/mL  3RD GRAM NEGATIVE BRITANY  *  NO GROWTH 2 DAYS       Physical Assessment:     Visit Vitals  /67 (BP 1 Location: Right arm, BP Patient Position: At rest)   Pulse 76   Temp 97.3 °F (36.3 °C)   Resp 22   Ht 5' 7\" (1.702 m)   Wt 107.9 kg (237 lb 12.8 oz)   SpO2 100%   Breastfeeding No   BMI 37.24 kg/m²     Last 3 Recorded Weights in this Encounter    12/13/19 0430 12/13/19 1350 12/14/19 0401   Weight: 108 kg (238 lb) 108 kg (238 lb) 107.9 kg (237 lb 12.8 oz)       Intake/Output Summary (Last 24 hours) at 12/14/2019 1332  Last data filed at 12/14/2019 6547  Gross per 24 hour   Intake 240 ml   Output 400 ml   Net -160 ml       Physial Exam:  General appearance: alert, cooperative, no distress, appears stated age  Skin: normal coloration and turgor, no rashes, no suspicious skin lesions noted. HEENT: Head; normocephalic, atraumatic. DAPHNE. ENT- ENT exam normal, no neck nodes or sinus tenderness. Lungs: clear to auscultation bilaterally  Heart: regular rate and rhythm, S1, S2 normal, no murmur, click, rub or gallop  Abdomen: soft, non-tender.  Bowel sounds normal. No masses,  no organomegaly  Extremities: edema +    PLAN / RECOMMENDATION:    Acute/crf stage 3,cardiorenal,worse with diuresis,agree with holding enteresto,hx of chronic urinary retention and suprapubic cath,check bladder scan ,if high will need irrigation of cath. continue iv lasix for now. discussed with pt and her daughter  Sec hyperparathyroidism,recheck phos and treat     Thank you for the consultation to participate in patient's care. I have personally discussed my plan with the referring physician.      Evon Morales MD  December 14, 2019

## 2019-12-14 NOTE — PROGRESS NOTES
Patient is not available to be assessed at this time. No family at bedside.     [address] [place] [address]   Board Certified [address] [place] [address]   [phone]

## 2019-12-15 LAB
ANION GAP SERPL CALC-SCNC: 10 MMOL/L (ref 3–18)
BACTERIA SPEC CULT: ABNORMAL
BASOPHILS # BLD: 0 K/UL (ref 0–0.1)
BASOPHILS NFR BLD: 0 % (ref 0–2)
BNP SERPL-MCNC: 1078 PG/ML (ref 0–900)
BUN SERPL-MCNC: 57 MG/DL (ref 7–18)
BUN/CREAT SERPL: 25 (ref 12–20)
CALCIUM SERPL-MCNC: 8.5 MG/DL (ref 8.5–10.1)
CHLORIDE SERPL-SCNC: 102 MMOL/L (ref 100–111)
CO2 SERPL-SCNC: 25 MMOL/L (ref 21–32)
CREAT SERPL-MCNC: 2.24 MG/DL (ref 0.6–1.3)
DIFFERENTIAL METHOD BLD: ABNORMAL
EOSINOPHIL # BLD: 0.2 K/UL (ref 0–0.4)
EOSINOPHIL NFR BLD: 4 % (ref 0–5)
ERYTHROCYTE [DISTWIDTH] IN BLOOD BY AUTOMATED COUNT: 13.8 % (ref 11.6–14.5)
GLUCOSE BLD STRIP.AUTO-MCNC: 129 MG/DL (ref 70–110)
GLUCOSE BLD STRIP.AUTO-MCNC: 132 MG/DL (ref 70–110)
GLUCOSE BLD STRIP.AUTO-MCNC: 141 MG/DL (ref 70–110)
GLUCOSE BLD STRIP.AUTO-MCNC: 156 MG/DL (ref 70–110)
GLUCOSE SERPL-MCNC: 143 MG/DL (ref 74–99)
GRAM STN SPEC: ABNORMAL
GRAM STN SPEC: ABNORMAL
HCT VFR BLD AUTO: 33.5 % (ref 35–45)
HGB BLD-MCNC: 11.3 G/DL (ref 12–16)
LYMPHOCYTES # BLD: 1.8 K/UL (ref 0.9–3.6)
LYMPHOCYTES NFR BLD: 35 % (ref 21–52)
MCH RBC QN AUTO: 27.4 PG (ref 24–34)
MCHC RBC AUTO-ENTMCNC: 33.7 G/DL (ref 31–37)
MCV RBC AUTO: 81.3 FL (ref 74–97)
MONOCYTES # BLD: 0.5 K/UL (ref 0.05–1.2)
MONOCYTES NFR BLD: 9 % (ref 3–10)
NEUTS SEG # BLD: 2.6 K/UL (ref 1.8–8)
NEUTS SEG NFR BLD: 52 % (ref 40–73)
PLATELET # BLD AUTO: 160 K/UL (ref 135–420)
PMV BLD AUTO: 9.8 FL (ref 9.2–11.8)
POTASSIUM SERPL-SCNC: 3.7 MMOL/L (ref 3.5–5.5)
RBC # BLD AUTO: 4.12 M/UL (ref 4.2–5.3)
SERVICE CMNT-IMP: ABNORMAL
SODIUM SERPL-SCNC: 137 MMOL/L (ref 136–145)
WBC # BLD AUTO: 5 K/UL (ref 4.6–13.2)

## 2019-12-15 PROCEDURE — 74011250637 HC RX REV CODE- 250/637: Performed by: INTERNAL MEDICINE

## 2019-12-15 PROCEDURE — 74011250637 HC RX REV CODE- 250/637: Performed by: HOSPITALIST

## 2019-12-15 PROCEDURE — 74011250636 HC RX REV CODE- 250/636: Performed by: HOSPITALIST

## 2019-12-15 PROCEDURE — 36415 COLL VENOUS BLD VENIPUNCTURE: CPT

## 2019-12-15 PROCEDURE — 85025 COMPLETE CBC W/AUTO DIFF WBC: CPT

## 2019-12-15 PROCEDURE — 82962 GLUCOSE BLOOD TEST: CPT

## 2019-12-15 PROCEDURE — 97165 OT EVAL LOW COMPLEX 30 MIN: CPT

## 2019-12-15 PROCEDURE — 65660000000 HC RM CCU STEPDOWN

## 2019-12-15 PROCEDURE — 74011636637 HC RX REV CODE- 636/637: Performed by: HOSPITALIST

## 2019-12-15 PROCEDURE — 74011250637 HC RX REV CODE- 250/637: Performed by: FAMILY MEDICINE

## 2019-12-15 PROCEDURE — 83880 ASSAY OF NATRIURETIC PEPTIDE: CPT

## 2019-12-15 PROCEDURE — 74011250636 HC RX REV CODE- 250/636: Performed by: FAMILY MEDICINE

## 2019-12-15 PROCEDURE — 80048 BASIC METABOLIC PNL TOTAL CA: CPT

## 2019-12-15 RX ORDER — FUROSEMIDE 40 MG/1
40 TABLET ORAL DAILY
Status: DISCONTINUED | OUTPATIENT
Start: 2019-12-16 | End: 2019-12-15

## 2019-12-15 RX ORDER — POTASSIUM CHLORIDE 20 MEQ/1
40 TABLET, EXTENDED RELEASE ORAL
Status: COMPLETED | OUTPATIENT
Start: 2019-12-15 | End: 2019-12-15

## 2019-12-15 RX ADMIN — LEVOTHYROXINE, LIOTHYRONINE 30 MG: 19; 4.5 TABLET ORAL at 07:05

## 2019-12-15 RX ADMIN — DOCUSATE SODIUM 100 MG: 100 CAPSULE, LIQUID FILLED ORAL at 10:01

## 2019-12-15 RX ADMIN — INSULIN LISPRO 2 UNITS: 100 INJECTION, SOLUTION INTRAVENOUS; SUBCUTANEOUS at 08:01

## 2019-12-15 RX ADMIN — FUROSEMIDE 40 MG: 10 INJECTION, SOLUTION INTRAMUSCULAR; INTRAVENOUS at 10:01

## 2019-12-15 RX ADMIN — Medication 10 ML: at 21:59

## 2019-12-15 RX ADMIN — CYCLOBENZAPRINE HYDROCHLORIDE 5 MG: 5 TABLET, FILM COATED ORAL at 21:45

## 2019-12-15 RX ADMIN — HEPARIN SODIUM 5000 UNITS: 5000 INJECTION INTRAVENOUS; SUBCUTANEOUS at 07:06

## 2019-12-15 RX ADMIN — POTASSIUM CHLORIDE 40 MEQ: 20 TABLET, EXTENDED RELEASE ORAL at 12:20

## 2019-12-15 RX ADMIN — GABAPENTIN 300 MG: 300 CAPSULE ORAL at 17:41

## 2019-12-15 RX ADMIN — GABAPENTIN 300 MG: 300 CAPSULE ORAL at 10:01

## 2019-12-15 RX ADMIN — CARVEDILOL 6.25 MG: 6.25 TABLET, FILM COATED ORAL at 17:41

## 2019-12-15 RX ADMIN — DOCUSATE SODIUM 100 MG: 100 CAPSULE, LIQUID FILLED ORAL at 17:41

## 2019-12-15 RX ADMIN — Medication 5 ML: at 06:00

## 2019-12-15 RX ADMIN — HEPARIN SODIUM 5000 UNITS: 5000 INJECTION INTRAVENOUS; SUBCUTANEOUS at 14:00

## 2019-12-15 RX ADMIN — HEPARIN SODIUM 5000 UNITS: 5000 INJECTION INTRAVENOUS; SUBCUTANEOUS at 21:46

## 2019-12-15 RX ADMIN — INSULIN GLARGINE 10 UNITS: 100 INJECTION, SOLUTION SUBCUTANEOUS at 21:46

## 2019-12-15 RX ADMIN — CARVEDILOL 6.25 MG: 6.25 TABLET, FILM COATED ORAL at 10:01

## 2019-12-15 NOTE — ROUTINE PROCESS
Bedside shift change report given to DANIEL Helms (oncoming nurse) by Bonnie Suero (offgoing nurse). Report included the following information SBAR, Kardex, Intake/Output, Recent Results and Cardiac Rhythm Paced.

## 2019-12-15 NOTE — PROGRESS NOTES
[facility]  -  Progress Note      Patient: Selam Carrera MRN: [id number]  SSN: [id number]    YOB: 1950  Age: 71 y.o. Sex: female      Admit Date: 12/12/2019    Assessment:     -Acute on chronic systolic heart failure.  Significantly improved with IV diuretic. NT proBNP level approaching baseline. Ejection fraction remains severely depressed, EF 20 to 25% on repeat echocardiogram this admission.  -Chronic kidney disease. Creatinine worsening since last month, but appears creatinine 1.4-1.6 over the previous few months. Increased creatinine noted, however this has stabilized. -Severe nonischemic cardiomyopathy. EF 26-30% on echo 5/2019. widely patent coronaries 8/1996. Nuclear stress 6/2015 without ischemia. -Biventricular PPM 10/2012 (in abdomen) with generator change out 2/2019.  She previously had a biventricular ICD but it was removed due to infection at the site. Normal device function on interrogation, no significant arrhythmias.  -Hypertension. Low normal BP.  -H/o suprapubic catheter. Recent change out. Increased tenderness suprapubic area. -H/o recurrent infections.  -Paraplegia. Fall 4/2017 resulting in trauma to her back developing an infected right psoas hematoma with development of an epidural abscess with neurological compromise with resulting paraplegia.  -History of hypothyroidism. On replacement. Checked TSH level yesterday which was a little low. Primary cardiologist Dr. Al So: We will stop IV Lasix and plan on resuming home Lasix regimen tomorrow morning  Will defer abnormal TSH levels to the hospitalist  Anticipate patient will be able to be discharged home tomorrow. I would continue the remainder of her cardiac medical regimen. Subjective:     Feeling better.   Patient states her shortness of breath has improved    Objective:      Patient Vitals for the past 8 hrs:   Temp Pulse Resp BP SpO2   12/15/19 0729 97.4 °F (36.3 °C) 64 17 105/69 100 %   12/15/19 0509 97.4 °F (36.3 °C) 65 18 99/63 100 %         Patient Vitals for the past 96 hrs:   Weight   12/15/19 0509 110.2 kg (243 lb)   12/14/19 0401 107.9 kg (237 lb 12.8 oz)   12/13/19 1350 108 kg (238 lb)   12/13/19 0430 108 kg (238 lb)   12/12/19 2236 108 kg (238 lb)         Intake/Output Summary (Last 24 hours) at 12/15/2019 1109  Last data filed at 12/15/2019 0510  Gross per 24 hour   Intake 240 ml   Output 1750 ml   Net -1510 ml       Physical Exam:  General:  alert, cooperative, no distress, appears stated age  Neck:  no JVD  Lungs:  diminished breath sounds R base, L base, no wheezes, rhonchi or rales  Heart:  regular rate and rhythm  Abdomen:  abdomen is soft without significant tenderness, masses, organomegaly or guarding  Extremities:  edema 1+ chronic appearing bilateral    Data Review:     Labs: Results:       Chemistry Recent Labs     12/15/19  0311 12/14/19  0144 12/13/19  0518 12/12/19  1602   * 147* 109* 126*    140 141 143   K 3.7 4.2 3.9 4.5    106 106 107   CO2 25 29 29 29   BUN 57* 52* 35* 30*   CREA 2.24* 2.34* 1.68* 1.51*   CA 8.5 9.1 9.4 10.1   MG  --   --  2.2 2.2   PHOS  --   --  4.1  --    AGAP 10 5 6 7   BUCR 25* 22* 21* 20   AP  --   --  105 126*   TP  --   --  7.0 8.1   ALB  --   --  3.3* 4.1   GLOB  --   --  3.7 4.0   AGRAT  --   --  0.9 1.0      CBC w/Diff Recent Labs     12/15/19  0311 12/14/19  0144 12/13/19  0518   WBC 5.0 5.1 6.2   RBC 4.12* 4.27 4.40   HGB 11.3* 11.8* 12.4   HCT 33.5* 35.1 35.9    160 178   GRANS 52 44 52   LYMPH 35 41 35   EOS 4 4 2      Cardiac Enzymes No results found for: CPK, CK, CKMMB, CKMB, RCK3, CKMBT, CKNDX, CKND1, CARLI, TROPT, TROIQ, AMBERLY, TROPT, TNIPOC, BNP, BNPP   Coagulation Recent Labs     12/12/19  1602   PTP 12.4   INR 0.9   APTT 26.1       Lipid Panel Lab Results   Component Value Date/Time    Cholesterol, total 127 08/17/2018 02:40 AM    HDL Cholesterol 42 08/17/2018 02:40 AM    LDL, calculated 61.6 08/17/2018 02:40 AM    VLDL, calculated 23.4 08/17/2018 02:40 AM    Triglyceride 117 08/17/2018 02:40 AM    CHOL/HDL Ratio 3.0 08/17/2018 02:40 AM      BNP Lab Results   Component Value Date/Time    B-type Natriuretic Peptide 111.9 (H) 11/19/2013 10:17 AM      Liver Enzymes Recent Labs     12/13/19  0518   TP 7.0   ALB 3.3*      SGOT 32      Digoxin    Thyroid Studies Lab Results   Component Value Date/Time    TSH 0.29 (L) 12/14/2019 12:55 PM

## 2019-12-15 NOTE — PROGRESS NOTES
RENAL DAILY PROGRESS NOTE    Patient: Kathy Martini               Sex: female          DOA: 12/12/2019  2:25 PM        YOB: 1950      Age:  71 y.o.        LOS:  LOS: 3 days     Subjective:     Kathy Martini is a 71 y.o.  who presents with Elevated troponin [R79.89]  Dyspnea [R06.00]. Asked to evaluate for renal failure,admitted with sob. hx of chf,crf stage 3,chronic suprapubic cath  Chief complains: Patient denies nausea, vomiting, chest pain, dizziness, shortness of breath or headache.  - Reviewed last 24 hrs events     Current Facility-Administered Medications   Medication Dose Route Frequency    [START ON 12/16/2019] furosemide (LASIX) tablet 40 mg  40 mg Oral DAILY    gabapentin (NEURONTIN) capsule 300 mg  300 mg Oral BID    hydrOXYzine pamoate (VISTARIL) capsule 50 mg  50 mg Oral QHS PRN    dextrose 10% infusion 125-250 mL  125-250 mL IntraVENous PRN    cyclobenzaprine (FLEXERIL) tablet 5 mg  5 mg Oral TID PRN    hydrOXYzine pamoate (VISTARIL) capsule 25 mg  25 mg Oral TID PRN    insulin glargine (LANTUS) injection 10 Units  10 Units SubCUTAneous QHS    insulin lispro (HUMALOG) injection   SubCUTAneous AC&HS    sodium chloride (NS) flush 5-40 mL  5-40 mL IntraVENous Q8H    sodium chloride (NS) flush 5-40 mL  5-40 mL IntraVENous PRN    diphenhydrAMINE (BENADRYL) injection 25 mg  25 mg IntraVENous Q4H PRN    ondansetron (ZOFRAN) injection 4 mg  4 mg IntraVENous Q4H PRN    docusate sodium (COLACE) capsule 100 mg  100 mg Oral BID    heparin (porcine) injection 5,000 Units  5,000 Units SubCUTAneous Q8H    thyroid (Pork) (ARMOUR) tablet 30 mg  30 mg Oral 6am    carvedilol (COREG) tablet 6.25 mg  6.25 mg Oral BID    [Held by provider] sacubitril-valsartan (ENTRESTO)  mg tablet 1 Tab  1 Tab Oral BID       Objective:     Visit Vitals  /69 (BP 1 Location: Left arm, BP Patient Position: At rest)   Pulse 64   Temp 97.4 °F (36.3 °C)   Resp 17   Ht 5' 7\" (1.702 m) Wt 110.2 kg (243 lb)   SpO2 100%   Breastfeeding No   BMI 38.06 kg/m²       Intake/Output Summary (Last 24 hours) at 12/15/2019 1108  Last data filed at 12/15/2019 0510  Gross per 24 hour   Intake 240 ml   Output 1750 ml   Net -1510 ml       Physical Examination:     GEN: AAO X 3, NAD  RS: Chest is bilateral equal, no wheezing / rales / crackles  CVS: S1-S2 heard, RRR, No S3 / murmur  Abdomen: Soft, Non tender, Not distended, Positive bowel sounds, no organomegaly, no CVA / supra pubic tenderness  Extremities: + edema, no cyanosis, skin is warm on touch  CNS: Awake & follows commands, CN II-XII are grossly intact. HEENT: Head is atraumatic, PERRLA, conjunctiva pink & non icteric. No JVD or carotid bruit     Data Review:      Labs:     Hematology:   Recent Labs     12/15/19  0311 12/14/19  0144 12/13/19 0518 12/12/19  1602   WBC 5.0 5.1 6.2 8.0   HGB 11.3* 11.8* 12.4 14.7   HCT 33.5* 35.1 35.9 41.8     Chemistry:   Recent Labs     12/15/19  0311 12/14/19  0144 12/13/19  0518 12/12/19  1602   BUN 57* 52* 35* 30*   CREA 2.24* 2.34* 1.68* 1.51*   CA 8.5 9.1 9.4 10.1   ALB  --   --  3.3* 4.1   K 3.7 4.2 3.9 4.5    140 141 143    106 106 107   CO2 25 29 29 29   PHOS  --   --  4.1  --    * 147* 109* 126*        Images:    XR (Most Recent). CXR reviewed by me and compared with previous CXR Results from Hospital Encounter encounter on 12/12/19   XR CHEST SNGL V    Narrative PORTABLE CHEST    HISTORY: Dyspnea, palpitations    COMPARISON: 3/20/2019    FINDINGS: No evidence of pneumonia or acute pulmonary infiltrate. Lung volumes  are satisfactory and there is no evidence of a pleural effusion or pneumothorax. Cardiac size is borderline prominent. Pulmonary markings are within normal  limits. The patient is status post sternotomy. There are several pacing wires  projecting over the cardiac silhouette; unchanged since the prior study.       Impression IMPRESSION: No evidence of acute cardiopulmonary disease. Mild cardiomegaly. No  significant interval changes. CT (Most Recent) Results from Hospital Encounter encounter on 12/27/18   CT ABD PELV W CONT    Narrative CT ABDOMEN AND PELVIS WITH ENHANCEMENT    INDICATION: Paraplegic with Admitted with right foot infection suspicious for  osteomyelitis with dysphagia, nausea, abdominal pain. TECHNIQUE: Axial images obtained of the abdomen and pelvis following the  uneventful administration of  70 cc's Isovue-300 nonionic intravenous contrast.   Coronal and sagittal reformatted images of the abdomen and pelvis were obtained. All CT scans at this facility are performed using dose optimization technique as  appropriate to a performed exam, to include automated exposure control,  adjustment of the mA and/or kV according to patient size (including appropriate  matching first site-specific examinations), or use of iterative reconstruction  technique. COMPARISON: 6/8/2017; CT chest 9/4/2018. ABDOMEN FINDINGS:     Liver: Unremarkable. Spleen: Similar mildly enlarged measuring 13.2 cm in AP diameter. Pancreas: Unremarkable. Biliary: Cholecystectomy. No biliary ductal dilation. Bowel: Moderate colonic stool burden in the colon and rectum. Normal appendix. Peritoneum/ Retroperitoneum: The prior right psoas enlargement and abscess have  resolved. No ascites or free fluid. Lymph Nodes: Unremarkable. Adrenal Glands: Unremarkable. Kidneys: Cortical scarring right greater than left. There is a similar sized 1.5  cm exophytic lesion at the right upper renal pole with density greater than  water since at least 6/8/2017. Size is reassuring for benign or indolent lesion. There is associated cortical scarring. Similar 6 cm exophytic left lower pole  renal cyst. Similar to small to characterize right interpolar renal lesion. Vessels: Mild atherosclerosis. Infrarenal IVC filter in place. IVC and renal  veins are flattened.     PELVIS FINDINGS:     Bladder/ Pelvic Organs: Bladder is decompressed with Krueger in place. Status post  hysterectomy. No suspicious adnexal lesions. No free fluid in the pelvis. Lung Base: Status post CABG. Epicardial pacer leads. Left anterior abdominal  wall cardiac device. Similar region of subsegmental atelectasis or scarring at  the right greater than left lung bases. There is associated mild bronchiectasis. Bones: Sarcopenia compatible with history of paraplegia. Incompletely visualized  median sternotomy. Lumbar facet hypertrophy. Osteopenia. There is a linear 3.8 x  1.6 cm soft tissue density within the soft tissues overlying the left  coccyx/medial gluteal fold (91). No fluid density. Underlying cortex is intact. Degenerative changes left greater than right hips. Degenerative disc disease. Impression IMPRESSION:    1. Moderate colonic stool burden; correlate for constipation. 2.  Indeterminate new superficial soft tissue density near the coccyx/left  medial gluteal fold without fluid density. This could represent phlegmon or  scarring. No underlying findings of osteomyelitis. 3.  Resolved right psoas abscess. 4.  Hypovolemia. 5.  Similar mild splenomegaly. 6.  Lung base bronchiectasis, likely sequela of prior infection. EKG Results for orders placed or performed in visit on 02/07/19   AMB POC EKG ROUTINE W/ 12 LEADS, INTER & REP     Status: None    Narrative    Normal sinus mechanism with atrial sensing and biventricular pacing with a rate of 80. I have personally reviewed the old medical records and patient's labs    Plan / Recommendation:      1. Acute/crf stage 3,cardiorenal ,diuretics. agree with switching to po lasix  2.chronic urinary retention related to paraplegia,chronic  suprapubic cath   3.hypokalemia,give one dose potasium today    D/w Dr. Rigoberto Gant MD  Nephrology  12/15/2019

## 2019-12-15 NOTE — PROGRESS NOTES
Problem: Self Care Deficits Care Plan (Adult)  Goal: *Acute Goals and Plan of Care (Insert Text)  Outcome: Resolved/Met    OCCUPATIONAL THERAPY EVALUATION/DISCHARGE    Patient: Filiberto Hyman (51 y.o. female)  Date: 12/15/2019  Primary Diagnosis: Elevated troponin [R79.89]  Dyspnea [R06.00]        Precautions: standard fall precautions    PLOF: modified independence    ASSESSMENT AND RECOMMENDATIONS:  Based on the objective data described below, the patient presents with modified independence. She is at her baseline level of function; therefore, skilled occupational therapy is not indicated at this time. Discharge Recommendations: None  Further Equipment Recommendations for Discharge: N/A      SUBJECTIVE:   Patient stated I do need a new drop arm bedside commode, mine is getting wobbly.     OBJECTIVE DATA SUMMARY:     Past Medical History:   Diagnosis Date    Acute paraplegia ([facility] [address] 4/20/2017    Benign hypertensive heart disease with systolic CHF, NYHA class 2 ([facility] [address] 9/5/2012    Biventricular implantable cardioverter-defibrillator in situ 04/28/2005    Upgraded to BiV AICD; gen change 4/2008; pocket revision 10/2009; Abdominal - done on 8/22/2012 by Dr. Jose Cruz Lawson     Cardiac cath 08/15/1996    Patent coronaries. Elev LVEDP. EF 50-55%. Cardiac echocardiogram 06/23/2015    Ltd study. EF 45-50%. Mild, diffuse hypk. Severe apical hypk. No mass or thrombus was clearly identified, although imaging was suboptimal.      Cardiac nuclear imaging test 06/19/2015    Fixed distal apical, distal septal defect more likely due to RV pacing than prior infarct. No ischemia. EF 46%. RWMA c/w RV pacing. Nondiagnostic EKG on pharm stress test.      Cardiovascular lower extremity venous duplex 09/04/2012    Acute, non-occlusive DVT in CFV on right. No DVT on left. No superficial thrombosis bilaterally. Cardiovascular upper extremity venous duplex 08/27/2012    DVT in axillary vein on left.   Left subclavian was not visualized.     Chronic anemia 9/5/2012    Chronic systolic heart failure (HCC)     Decreased calculated glomerular filtration rate (GFR) 3/30/2017    Calculated GFR equivalent to that of CKD stage 3 = 30-59 ml/min    Diabetic neuropathy associated with type 2 diabetes mellitus ([facility] [address] 6/28/2011    Difficult airway for intubation 08/22/2012    see anesthesia airway note    Dyslipidemia 6/28/2011    Gout     History of complete heart block 6/28/2011    History of Coumadin therapy     Anticoagulation for DVT of the LUE; Discontinued on 3/30/2017    History of deep venous thrombosis 9/5/2012    Left upper extremity    History of pyelonephritis 3/30/2017    Left bundle branch block (LBBB) on electrocardiogram 6/28/2011    Nonischemic cardiomyopathy ([address] 6/28/2011    Obesity (BMI 35.0-39.9 without comorbidity) 3/13/2017    Obstructive sleep apnea on CPAP 2/7/2012    Psoas abscess, right ([address] 4/20/2017    Psoas hematoma, right, secondary to anticoagulant therapy 3/30/2017    Type 2 diabetes mellitus with diabetic neuropathy ([address] 6/28/2011     Past Surgical History:   Procedure Laterality Date    HX CARPAL TUNNEL RELEASE  4/07    right     HX CHOLECYSTECTOMY  1994    HX HYSTERECTOMY  1973    HX OTHER SURGICAL  6/11/2012    AICD revision    HX PACEMAKER  4/28/2005    [employer] AICD    SC REMVL PERM PM PLS GEN W/REPL PLSE GEN 2 LEAD SYS N/A 2/26/2019    REMOVE & REPLACE PPM GEN DUAL LEAD performed by Yohan Serrano MD at [facility]     Barriers to Learning/Limitations: None    Home Situation:   Home Situation  Home Environment: Private residence  One/Two Story Residence: Two story  Living Alone: No  Support Systems: Family member(s), Child(lea)  Patient Expects to be Discharged to[de-identified] Private residence  Current DME Used/Available at Home: Wheelchair, Commode, bedside  [x]     Right hand dominant   []     Left hand dominant    Cognitive/Behavioral Status:  Neurologic State: Alert  Orientation Level: Oriented X4 Skin: no skin integrity issues noted  Edema: no UE edema noted    Vision/Perceptual:      Tracking is WFL     Coordination: BUE   WFL    Balance:   Sitting WFL    Strength: BUE  4 to 4+/5   Tone & Sensation: BUE  WFL   Range of Motion: BUE  AROM WFL   Functional Mobility and Transfers for ADLs:  Bed Mobility:   Modified independent rolling and supine to sit   Transfers: she just got back to bed   PT, She and her daughter report modified independent sit pivot transfers bed to [facility] with a drop arm (this is her baseline)   ADL Assessment:   Self feeding: independent  Grooming: independent (simulated)  UB bathing/dressing: independent (simulated)  LB bathing/dressing: modified independent (using a reacher and a sock aid) her reacher is worn and needs to be replaced (this is done)   Pain:  Pain level pre-treatment: 0/10   Pain level post-treatment: 0/10   Activity Tolerance:   No SOB noted and no c/o fatigue  Please refer to the flowsheet for vital signs taken during this treatment. After treatment:   []  Patient left in no apparent distress sitting up in chair  [x]  Patient left in no apparent distress in bed  [x]  Call bell left within reach  []  Nursing notified  [x]  Daughter is present  []  Bed alarm activated    COMMUNICATION/EDUCATION:   []      Role of Occupational Therapy in the acute care setting  []      Home safety education was provided and the patient/caregiver indicated understanding. [x]      Patient/family have participated as able and agree with findings and recommendations. []      Patient is unable to participate in plan of care at this time. Thank you for this referral.  Hillary Godwin OTR/L  Time Calculation: 12 mins      Eval Complexity: History: LOW Complexity : Brief history review ; Examination: LOW Complexity : 1-3 performance deficits relating to physical, cognitive , or psychosocial skils that result in activity limitations and / or participation restrictions ;    Decision Making:LOW Complexity : No comorbidities that affect functional and no verbal or physical assistance needed to complete eval tasks

## 2019-12-15 NOTE — PROGRESS NOTES
Progress Note         Patient: Jayne Garza MRN: [id number]  CSN: [id number]    YOB: 1950  Age: 71 y.o. Sex: female    DOA: 12/12/2019 LOS:  LOS: 3 days                    Subjective:     Jayne Garza is a 71 y.o. female with a PMHx of systolic CHF w/PPM, paraplegia s/p ruptured hematoma in L spine, HTN, Type II DM, chronic R heel ulcer and suprapubic cathter who is admitted for acute on chronic systolic CHF. Comfortable in bed   Breathing improved, close to baseline      Objective:     Physical Exam:  Visit Vitals  BP 99/65 (BP 1 Location: Right arm, BP Patient Position: At rest)   Pulse 72   Temp 97.4 °F (36.3 °C)   Resp 22   Ht 5' 7\" (1.702 m)   Wt 110.2 kg (243 lb)   SpO2 100%   Breastfeeding No   BMI 38.06 kg/m²        General:         Alert, cooperative, no acute distress    HEENT: NC, Atraumatic. Anicteric sclerae. Lungs: Diminished sounds bilaterally, CTAB   Heart:  Regular  rhythm,  No murmur, No Rubs, No Gallops  Abdomen: Soft, Non distended, Non tender. +Bowel sounds, + suprapubic catheter   Extremities: Trace edema to the mid-shin, R heel with clean, dry bandage in place    Psych:   Good insight. Not anxious or agitated  Neurologic:  Alert and oriented X 3; 5/5 strength X 4 extremities     Intake and Output:  Current Shift:  No intake/output data recorded.   Last three shifts:  12/13 1901 - 12/15 0700  In: 240 [P.O.:240]  Out: 2150 [Urine:2150]    Labs: Results:       Chemistry Recent Labs     12/15/19  0311 12/14/19  0144 12/13/19  0518   * 147* 109*    140 141   K 3.7 4.2 3.9    106 106   CO2 25 29 29   BUN 57* 52* 35*   CREA 2.24* 2.34* 1.68*   CA 8.5 9.1 9.4   AGAP 10 5 6   BUCR 25* 22* 21*   AP  --   --  105   TP  --   --  7.0   ALB  --   --  3.3*   GLOB  --   --  3.7   AGRAT  --   --  0.9      CBC w/Diff Recent Labs     12/15/19  0311 12/14/19  0144 12/13/19  0518   WBC 5.0 5.1 6.2   RBC 4.12* 4.27 4.40   HGB 11.3* 11.8* 12.4   HCT 33.5* 35.1 35.9   PLT 160 160 178   GRANS 52 44 52   LYMPH 35 41 35   EOS 4 4 2      Cardiac Enzymes Recent Labs     12/13/19  0518 12/13/19  0010   * 227*   CKND1 1.7 2.0      Coagulation No results for input(s): PTP, INR, APTT, INREXT, INREXT in the last 72 hours. Lipid Panel Lab Results   Component Value Date/Time    Cholesterol, total 127 08/17/2018 02:40 AM    HDL Cholesterol 42 08/17/2018 02:40 AM    LDL, calculated 61.6 08/17/2018 02:40 AM    VLDL, calculated 23.4 08/17/2018 02:40 AM    Triglyceride 117 08/17/2018 02:40 AM    CHOL/HDL Ratio 3.0 08/17/2018 02:40 AM      BNP No results for input(s): BNPP in the last 72 hours. Liver Enzymes Recent Labs     12/13/19  0518   TP 7.0   ALB 3.3*      SGOT 32      Thyroid Studies Lab Results   Component Value Date/Time    TSH 0.29 (L) 12/14/2019 12:55 PM                Assessment and Plan:     Mckayla Carreon is a 71 y.o. female with a PMHx of systolic CHF w/PPM, paraplegia s/p ruptured hematoma in L spine, HTN, Type II DM, chronic R heel ulcer and suprapubic cathter who is admitted for acute on chronic systolic CHF. Condition is improved but not baseline. 1. Acute on chronic systolic CHF- Echo 59/86 showed EF 20-25%  2. LISBETH on CKD Stage III   3. HTN   4. Type II DM- HbA1c 5.9 12/12/19   5. Hypothyroidism- TSH is a bit low; will let PCP make adjustments  6. Hx suprapubic catheter- exchanged 12/12 by urology   7. Hx paraplegia  8. Chronic back spams   9.  Itching     Nephrology consulted, appreciate recommendations   Cardiology consulted, appreciate recommendations   Cont IV lasix today; PO lasix tomorrow   Monitor BMP, CBC  Hold Entresto for now   Cont home meds: carvedilol   Cont home lantus, SSI w/accuchecks   Cont home meds: gabapentin, armour   Flexeril prn, hydroxyzine prn, zofran prn  PT, OT    PT recommends HH       Case discussed with:  [x]Patient  [x]Family  [x]Nursing  []Case Management  DVT prophylaxis: SQH   Diet: Cardiac   Code Status: FULL   Disposition: Cont care in tele, likely home tomorrow       H.  Yeimi Georges, DO  12/15/2019

## 2019-12-16 ENCOUNTER — APPOINTMENT (OUTPATIENT)
Dept: CT IMAGING | Age: 69
DRG: 291 | End: 2019-12-16
Attending: FAMILY MEDICINE
Payer: COMMERCIAL

## 2019-12-16 ENCOUNTER — HOME HEALTH ADMISSION (OUTPATIENT)
Dept: HOME HEALTH SERVICES | Facility: HOME HEALTH | Age: 69
End: 2019-12-16
Payer: COMMERCIAL

## 2019-12-16 LAB
ANION GAP SERPL CALC-SCNC: 5 MMOL/L (ref 3–18)
BACTERIA SPEC CULT: ABNORMAL
BASOPHILS # BLD: 0 K/UL (ref 0–0.1)
BASOPHILS NFR BLD: 0 % (ref 0–2)
BUN SERPL-MCNC: 54 MG/DL (ref 7–18)
BUN/CREAT SERPL: 27 (ref 12–20)
CALCIUM SERPL-MCNC: 8.9 MG/DL (ref 8.5–10.1)
CHLORIDE SERPL-SCNC: 107 MMOL/L (ref 100–111)
CO2 SERPL-SCNC: 27 MMOL/L (ref 21–32)
CREAT SERPL-MCNC: 1.98 MG/DL (ref 0.6–1.3)
DIFFERENTIAL METHOD BLD: ABNORMAL
EOSINOPHIL # BLD: 0.2 K/UL (ref 0–0.4)
EOSINOPHIL NFR BLD: 4 % (ref 0–5)
ERYTHROCYTE [DISTWIDTH] IN BLOOD BY AUTOMATED COUNT: 13.9 % (ref 11.6–14.5)
GLUCOSE BLD STRIP.AUTO-MCNC: 147 MG/DL (ref 70–110)
GLUCOSE BLD STRIP.AUTO-MCNC: 148 MG/DL (ref 70–110)
GLUCOSE BLD STRIP.AUTO-MCNC: 200 MG/DL (ref 70–110)
GLUCOSE BLD STRIP.AUTO-MCNC: 209 MG/DL (ref 70–110)
GLUCOSE SERPL-MCNC: 145 MG/DL (ref 74–99)
HCT VFR BLD AUTO: 35.3 % (ref 35–45)
HGB BLD-MCNC: 11.9 G/DL (ref 12–16)
LYMPHOCYTES # BLD: 1.5 K/UL (ref 0.9–3.6)
LYMPHOCYTES NFR BLD: 30 % (ref 21–52)
MCH RBC QN AUTO: 27.6 PG (ref 24–34)
MCHC RBC AUTO-ENTMCNC: 33.7 G/DL (ref 31–37)
MCV RBC AUTO: 81.9 FL (ref 74–97)
MONOCYTES # BLD: 0.3 K/UL (ref 0.05–1.2)
MONOCYTES NFR BLD: 7 % (ref 3–10)
NEUTS SEG # BLD: 2.8 K/UL (ref 1.8–8)
NEUTS SEG NFR BLD: 59 % (ref 40–73)
PLATELET # BLD AUTO: 146 K/UL (ref 135–420)
PMV BLD AUTO: 10 FL (ref 9.2–11.8)
POTASSIUM SERPL-SCNC: 4.2 MMOL/L (ref 3.5–5.5)
RBC # BLD AUTO: 4.31 M/UL (ref 4.2–5.3)
SERVICE CMNT-IMP: ABNORMAL
SODIUM SERPL-SCNC: 139 MMOL/L (ref 136–145)
WBC # BLD AUTO: 4.8 K/UL (ref 4.6–13.2)

## 2019-12-16 PROCEDURE — 74011250637 HC RX REV CODE- 250/637: Performed by: INTERNAL MEDICINE

## 2019-12-16 PROCEDURE — 77030011255 HC DSG AQUACEL AG BMS -A

## 2019-12-16 PROCEDURE — 85025 COMPLETE CBC W/AUTO DIFF WBC: CPT

## 2019-12-16 PROCEDURE — 80048 BASIC METABOLIC PNL TOTAL CA: CPT

## 2019-12-16 PROCEDURE — 71250 CT THORAX DX C-: CPT

## 2019-12-16 PROCEDURE — 65660000000 HC RM CCU STEPDOWN

## 2019-12-16 PROCEDURE — 82962 GLUCOSE BLOOD TEST: CPT

## 2019-12-16 PROCEDURE — 74011250637 HC RX REV CODE- 250/637: Performed by: FAMILY MEDICINE

## 2019-12-16 PROCEDURE — 77030037878 HC DRSG MEPILEX >48IN BORD MOLN -B

## 2019-12-16 PROCEDURE — 74011250636 HC RX REV CODE- 250/636: Performed by: HOSPITALIST

## 2019-12-16 PROCEDURE — 74011250637 HC RX REV CODE- 250/637: Performed by: HOSPITALIST

## 2019-12-16 PROCEDURE — 74011636637 HC RX REV CODE- 636/637: Performed by: HOSPITALIST

## 2019-12-16 PROCEDURE — 36415 COLL VENOUS BLD VENIPUNCTURE: CPT

## 2019-12-16 RX ORDER — INSULIN GLARGINE 100 [IU]/ML
10 INJECTION, SOLUTION SUBCUTANEOUS
Qty: 10 VIAL | Refills: 0 | Status: SHIPPED | OUTPATIENT
Start: 2019-12-16

## 2019-12-16 RX ORDER — FUROSEMIDE 40 MG/1
60 TABLET ORAL DAILY
Qty: 30 TAB | Refills: 0 | Status: SHIPPED | OUTPATIENT
Start: 2019-12-16 | End: 2020-01-09 | Stop reason: SDUPTHER

## 2019-12-16 RX ADMIN — DOCUSATE SODIUM 100 MG: 100 CAPSULE, LIQUID FILLED ORAL at 17:02

## 2019-12-16 RX ADMIN — HEPARIN SODIUM 5000 UNITS: 5000 INJECTION INTRAVENOUS; SUBCUTANEOUS at 22:10

## 2019-12-16 RX ADMIN — Medication 10 ML: at 16:44

## 2019-12-16 RX ADMIN — GABAPENTIN 300 MG: 300 CAPSULE ORAL at 08:59

## 2019-12-16 RX ADMIN — INSULIN LISPRO 4 UNITS: 100 INJECTION, SOLUTION INTRAVENOUS; SUBCUTANEOUS at 12:45

## 2019-12-16 RX ADMIN — LEVOTHYROXINE, LIOTHYRONINE 30 MG: 19; 4.5 TABLET ORAL at 06:19

## 2019-12-16 RX ADMIN — INSULIN LISPRO 4 UNITS: 100 INJECTION, SOLUTION INTRAVENOUS; SUBCUTANEOUS at 22:11

## 2019-12-16 RX ADMIN — CARVEDILOL 6.25 MG: 6.25 TABLET, FILM COATED ORAL at 17:02

## 2019-12-16 RX ADMIN — FUROSEMIDE 60 MG: 40 TABLET ORAL at 08:59

## 2019-12-16 RX ADMIN — HEPARIN SODIUM 5000 UNITS: 5000 INJECTION INTRAVENOUS; SUBCUTANEOUS at 16:41

## 2019-12-16 RX ADMIN — Medication 10 ML: at 22:14

## 2019-12-16 RX ADMIN — Medication 10 ML: at 06:23

## 2019-12-16 RX ADMIN — HEPARIN SODIUM 5000 UNITS: 5000 INJECTION INTRAVENOUS; SUBCUTANEOUS at 06:19

## 2019-12-16 RX ADMIN — CARVEDILOL 6.25 MG: 6.25 TABLET, FILM COATED ORAL at 08:59

## 2019-12-16 RX ADMIN — GABAPENTIN 300 MG: 300 CAPSULE ORAL at 17:02

## 2019-12-16 RX ADMIN — INSULIN GLARGINE 10 UNITS: 100 INJECTION, SOLUTION SUBCUTANEOUS at 22:08

## 2019-12-16 RX ADMIN — DOCUSATE SODIUM 100 MG: 100 CAPSULE, LIQUID FILLED ORAL at 08:59

## 2019-12-16 RX ADMIN — CYCLOBENZAPRINE HYDROCHLORIDE 5 MG: 5 TABLET, FILM COATED ORAL at 16:41

## 2019-12-16 NOTE — PROGRESS NOTES
NUTRITION    Nursing Referral: Pressure Injury     RECOMMENDATIONS / PLAN:     - Continue Lukas Drink BID x week post-discharge. - Continue RD inpatient monitoring and evaluation. NUTRITION INTERVENTIONS & DIAGNOSIS:     - Meals/snacks: modify composition  - Medical food supplement therapy: Lukas Drink, BID  - Collaboration and referral of nutrition care: interdisciplinary rounds    Nutrition Diagnosis: Increased nutrient needs protein related to increased demand for wound healing as evidenced by pt with pressure injury    ASSESSMENT:     12/16: Continues with good meal intake per pt report. States she only received Lukas 1x since order placed but did like it, discussed with kitchen staff. Plan for discharge today, information for Kareen Rodriguez provided and recommended pt continue with use x week. 12/13: Paraplegia s/p infection in her lumbar spine. Pressure injury noted, excellent meal intake since admission, 100% of lunch today. Tolerating diet. Agreeable to supplements.     Nutritional intake adequate to meet patients estimated nutritional needs:  Unable to determine at this time    Diet: DIET DIABETIC CONSISTENT CARB Regular  DIET NUTRITIONAL SUPPLEMENTS Lunch, Dinner; [address] DRINK MIX      Food Allergies: Blueberry, Shellfish  Current Appetite: Good  Appetite/meal intake prior to admission: Good decreased intake x 2 weeks (2 meals/day)  Feeding Limitations:  [] Swallowing difficulty    [] Chewing difficulty    [] Other:  Current Meal Intake:   Patient Vitals for the past 100 hrs:   % Diet Eaten   12/13/19 1814 45 %   12/13/19 1414 100 %   12/13/19 0934 95 %       BM: 12/15  Skin Integrity: stage 3 pressure injury to right heel  Edema:   [x] No     [] Yes   Pertinent Medications: Reviewed: colace, furosemide, lantus (10 units), SSI, ondansetron    Recent Labs     12/16/19  0529 12/15/19  0311 12/14/19  0144    137 140   K 4.2 3.7 4.2    102 106   CO2 27 25 29   * 143* 147*   BUN 54* 57* 52*   CREA 1.98* 2.24* 2.34*   CA 8.9 8.5 9.1       Intake/Output Summary (Last 24 hours) at 12/16/2019 1238  Last data filed at 12/16/2019 0520  Gross per 24 hour   Intake    Output 1750 ml   Net -1750 ml       Anthropometrics:  Ht Readings from Last 1 Encounters:   12/13/19 5' 7\" (1.702 m)     Last 3 Recorded Weights in this Encounter    12/14/19 0401 12/15/19 0509 12/16/19 0648   Weight: 107.9 kg (237 lb 12.8 oz) 110.2 kg (243 lb) 108.4 kg (239 lb)     Body mass index is 37.43 kg/m². Obese Class II    Weight History: Pt reports a UBW of 232 lbs. Weight Metrics 12/16/2019 12/12/2019 10/16/2019 10/1/2019 9/17/2019 8/19/2019 8/7/2019   Weight 239 lb - 236 lb 236 lb 236 lb - 236 lb   BMI - 37.43 kg/m2 36.96 kg/m2 36.96 kg/m2 36.96 kg/m2 36.96 kg/m2 36.96 kg/m2        Admitting Diagnosis: Elevated troponin [R79.89]  Dyspnea [R06.00]  Pertinent PMHx: acute paraplegia, chronic heart failure, DM with neuropathy, dyslipidemia, DVT, HALI    Education Needs:        [x] None identified  [] Identified - Not appropriate at this time  []  Identified and addressed - refer to education log  Learning Limitations:   [x] None identified  [] Identified    Cultural, Faith & ethnic food preferences:  [x] None identified    [] Identified and addressed     ESTIMATED NUTRITION NEEDS:     Calories: 1210-6417 kcal (MSJx1.2-1.3) based on  [x] Actual BW: 108 kg      [] IBW   Protein: 130-162 gm (1.2-1.5 gm/kg) based on  [x] Actual BW      [] IBW   Fluid: 1 mL/kcal     MONITORING & EVALUATION:     Nutrition Goal(s):   - PO nutrition intake will meet >75% of patient estimated nutritional needs within the next 7 days.    Outcome: Progressing towards goal     Monitoring:   [x] Food and nutrient intake   [x] Food and nutrient administration  [x] Comparative standards   [x] Nutrition-focused physical findings   [x] Anthropometric Measurements   [x] Treatment/therapy   [x] Biochemical data, medical tests, and procedures        Previous Recommendations (for follow-up assessments only): Implemented      Discharge Planning: Oral diet + Lukas Drink BID x week  Participated in care planning, discharge planning, & interdisciplinary rounds as appropriate.       Yaron Jacome RD  Pager: [phone]

## 2019-12-16 NOTE — DISCHARGE INSTRUCTIONS
Patient Education        Avoiding Triggers With Heart Failure: Care Instructions  Your Care Instructions    Triggers are anything that make your heart failure flare up. A flare-up is also called \"sudden heart failure\" or \"acute heart failure. \" When you have a flare-up, fluid builds up in your lungs, and you have problems breathing. You might need to go to the hospital. By watching for changes in your condition and avoiding triggers, you can prevent heart failure flare-ups. Follow-up care is a key part of your treatment and safety. Be sure to make and go to all appointments, and call your doctor if you are having problems. It's also a good idea to know your test results and keep a list of the medicines you take. How can you care for yourself at home? Watch for changes in your weight and condition  · Weigh yourself without clothing at the same time each day. Record your weight. Call your doctor if you have sudden weight gain, such as more than 2 to 3 pounds in a day or 5 pounds in a week. (Your doctor may suggest a different range of weight gain.) A sudden weight gain may mean that your heart failure is getting worse. · Keep a daily record of your symptoms. Write down any changes in how you feel, such as new shortness of breath, cough, or problems eating. Also record if your ankles are more swollen than usual and if you feel more tired than usual. Note anything that you ate or did that could have triggered these changes. Limit sodium  Sodium causes your body to hold on to extra water. This may cause your heart failure symptoms to get worse. People get most of their sodium from processed foods. Fast food and restaurant meals also tend to be very high in sodium. · Your doctor may suggest that you limit sodium to 2,000 milligrams (mg) a day or less. That is less than 1 teaspoon of salt a day, including all the salt you eat in cooking or in packaged foods. · Read food labels on cans and food packages.  They tell you how much sodium you get in one serving. Check the serving size. If you eat more than one serving, you are getting more sodium. · Be aware that sodium can come in forms other than salt, including monosodium glutamate (MSG), sodium citrate, and sodium bicarbonate (baking soda). MSG is often added to Asian food. You can sometimes ask for food without MSG or salt. · Slowly reducing salt will help you adjust to the taste. Take the salt shaker off the table. · Flavor your food with garlic, lemon juice, onion, vinegar, herbs, and spices instead of salt. Do not use soy sauce, steak sauce, onion salt, garlic salt, mustard, or ketchup on your food, unless it is labeled \"low-sodium\" or \"low-salt. \"  · Make your own salad dressings, sauces, and ketchup without adding salt. · Use fresh or frozen ingredients, instead of canned ones, whenever you can. Choose low-sodium canned goods. · Eat less processed food and food from restaurants, including fast food. Exercise as directed  Moderate, regular exercise is very good for your heart. It improves your blood flow and helps control your weight. But too much exercise can stress your heart and cause a heart failure flare-up. · Check with your doctor before you start an exercise program.  · Walking is an easy way to get exercise. Start out slowly. Gradually increase the length and pace of your walk. Swimming, riding a bike, and using a treadmill are also good forms of exercise. · When you exercise, watch for signs that your heart is working too hard. You are pushing yourself too hard if you cannot talk while you are exercising. If you become short of breath or dizzy or have chest pain, stop, sit down, and rest.  · Do not exercise when you do not feel well. Take medicines correctly  · Take your medicines exactly as prescribed. Call your doctor if you think you are having a problem with your medicine. · Make a list of all the medicines you take.  Include those prescribed to you by other doctors and any over-the-counter medicines, vitamins, or supplements you take. Take this list with you when you go to any doctor. · Take your medicines at the same time every day. It may help you to post a list of all the medicines you take every day and what time of day you take them. · Make taking your medicine as simple as you can. Plan times to take your medicines when you are doing other things, such as eating a meal or getting ready for bed. This will make it easier to remember to take your medicines. · Get organized. Use helpful tools, such as daily or weekly pill containers. When should you call for help? Call 911 if you have symptoms of sudden heart failure such as:    · You have severe trouble breathing.     · You cough up pink, foamy mucus.     · You have a new irregular or rapid heartbeat.    Call your doctor now or seek immediate medical care if:    · You have new or increased shortness of breath.     · You are dizzy or lightheaded, or you feel like you may faint.     · You have sudden weight gain, such as more than 2 to 3 pounds in a day or 5 pounds in a week. (Your doctor may suggest a different range of weight gain.)     · You have increased swelling in your legs, ankles, or feet.     · You are suddenly so tired or weak that you cannot do your usual activities.    Watch closely for changes in your health, and be sure to contact your doctor if you develop new symptoms. Where can you learn more? Go to http://aren-mirella.info/. Enter F326 in the search box to learn more about \"Avoiding Triggers With Heart Failure: Care Instructions. \"  Current as of: April 9, 2019  Content Version: 12.2  © 6031-8753 [employer]. Care instructions adapted under license by [employer] (which disclaims liability or warranty for this information).  If you have questions about a medical condition or this instruction, always ask your healthcare professional. [employer] disclaims any warranty or liability for your use of this information. Patient Education        Learning About Heart Failure Zones  What are heart failure zones? Heart failure zones give you an easy way to see changes in your heart failure symptoms. They also tell you when you need to get help. Check every day to see which zone you are in. Green zone. You are doing well. This is where you want to be. · Your weight is stable. This means it is not going up or down. · You breathe easily. · You are sleeping well. You are able to lie flat without shortness of breath. · You can do your usual activities. Yellow zone. Be careful. Your symptoms are changing. Call your doctor. · You have new or increased shortness of breath. · You are dizzy or lightheaded, or you feel like you may faint. · You have sudden weight gain, such as more than 2 to 3 pounds in a day or 5 pounds in a week. (Your doctor may suggest a different range of weight gain.)  · You have increased swelling in your legs, ankles, or feet. · You are so tired or weak that you cannot do your usual activities. · You are not sleeping well. Shortness of breath wakes you up at night. You need extra pillows. Your doctor's name: ____________________________________________________________  Your doctor's contact information: _________________________________________________  Red zone. This is an emergency. Call 911. You have symptoms of sudden heart failure, such as:  · You have severe trouble breathing. · You cough up pink, foamy mucus. · You have a new irregular or fast heartbeat. You have symptoms of a heart attack. These may include:  · Chest pain or pressure, or a strange feeling in the chest.  · Sweating. · Shortness of breath. · Nausea or vomiting. · Pain, pressure, or a strange feeling in the back, neck, jaw, or upper belly or in one or both shoulders or arms. · Lightheadedness or sudden weakness.   · A fast or irregular heartbeat. If you have symptoms of a heart attack: After you call 911, the  may tell you to chew 1 adult-strength or 2 to 4 low-dose aspirin. Wait for an ambulance. Do not try to drive yourself. Follow-up care is a key part of your treatment and safety. Be sure to make and go to all appointments, and call your doctor if you are having problems. It's also a good idea to know your test results and keep a list of the medicines you take. Where can you learn more? Go to http://aren-mirella.info/. Enter T174 in the search box to learn more about \"Learning About Heart Failure Zones. \"  Current as of: April 9, 2019  Content Version: 12.2  © 5216-7995 [employer]. Care instructions adapted under license by [employer] (which disclaims liability or warranty for this information). If you have questions about a medical condition or this instruction, always ask your healthcare professional. [place] [address] any warranty or liability for your use of this information.

## 2019-12-16 NOTE — PROGRESS NOTES
RENAL DAILY PROGRESS NOTE            68y F with PMH HTn, heart failure, CKD, chronic suprapubic catheter, seen for renal failure   Subjective:       Complaint:   Overnight events noted  Breathing is at baseline,   Good urine output    IMPRESSION:   Acute on chronic renal failure   CKD  Neurogenic bladder, has suprapubic catheter   Paraplegia s/p spinal cord injury , h/o CVA  Heart failure HFrEF, non ischemic cardiomyopathy,     PLAN:    Appears stable from volume stand point, her renal function continue to improve. Okay to discharge from renal stand point. Follow up in clinic in 4-6 weeks. Current Facility-Administered Medications   Medication Dose Route Frequency    furosemide (LASIX) tablet 60 mg  60 mg Oral DAILY    gabapentin (NEURONTIN) capsule 300 mg  300 mg Oral BID    hydrOXYzine pamoate (VISTARIL) capsule 50 mg  50 mg Oral QHS PRN    dextrose 10% infusion 125-250 mL  125-250 mL IntraVENous PRN    cyclobenzaprine (FLEXERIL) tablet 5 mg  5 mg Oral TID PRN    hydrOXYzine pamoate (VISTARIL) capsule 25 mg  25 mg Oral TID PRN    insulin glargine (LANTUS) injection 10 Units  10 Units SubCUTAneous QHS    insulin lispro (HUMALOG) injection   SubCUTAneous AC&HS    sodium chloride (NS) flush 5-40 mL  5-40 mL IntraVENous Q8H    sodium chloride (NS) flush 5-40 mL  5-40 mL IntraVENous PRN    diphenhydrAMINE (BENADRYL) injection 25 mg  25 mg IntraVENous Q4H PRN    ondansetron (ZOFRAN) injection 4 mg  4 mg IntraVENous Q4H PRN    docusate sodium (COLACE) capsule 100 mg  100 mg Oral BID    heparin (porcine) injection 5,000 Units  5,000 Units SubCUTAneous Q8H    thyroid (Pork) (ARMOUR) tablet 30 mg  30 mg Oral 6am    carvedilol (COREG) tablet 6.25 mg  6.25 mg Oral BID    [Held by provider] sacubitril-valsartan (ENTRESTO)  mg tablet 1 Tab  1 Tab Oral BID       Review of Symptoms: comprehensive ROS negative except above.    Objective:     Patient Vitals for the past 24 hrs: Temp Pulse Resp BP SpO2   12/16/19 1055 97.3 °F (36.3 °C) 76 16 134/62 100 %   12/16/19 0720 97.3 °F (36.3 °C) 71 18 116/66 100 %   12/16/19 0521 97.3 °F (36.3 °C) 72 18 117/70 95 %   12/15/19 2313 98.1 °F (36.7 °C) 68 18 101/61 99 %   12/15/19 2014 97.9 °F (36.6 °C) 73 19 108/69 99 %   12/15/19 1551 97.4 °F (36.3 °C) 72 22 99/65 100 %        Weight change: -1.814 kg (-4 lb)     12/14 1901 - 12/16 0700  In: 240 [P.O.:240]  Out: 2500 [Urine:2500]    Intake/Output Summary (Last 24 hours) at 12/16/2019 1135  Last data filed at 12/16/2019 0520  Gross per 24 hour   Intake    Output 1750 ml   Net -1750 ml     Physical Exam:   General: comfortable, no acute distress   HEENT sclera anicteric, supple neck, no thyromegaly  CVS: S1S2 heard,  no rub  RS: + air entry b/l,   Abd: Soft, Non tender, Not distended, Positive bowel sounds, no organomegaly, no CVA / supra pubic tenderness  Neuro: non focal, awake, alert , CN II-XII are grossly intact  Extrm: ++edema, no cyanosis, clubbing   Skin: no visible  Rash  Musculoskeletal: No gross joints or bone deformities         Data Review:     LABS:   Hematology:   Recent Labs     12/16/19  0529 12/15/19  0311 12/14/19  0144   WBC 4.8 5.0 5.1   HGB 11.9* 11.3* 11.8*   HCT 35.3 33.5* 35.1     Chemistry:   Recent Labs     12/16/19  0529 12/15/19  0311 12/14/19  0144   BUN 54* 57* 52*   CREA 1.98* 2.24* 2.34*   CA 8.9 8.5 9.1   K 4.2 3.7 4.2    137 140    102 106   CO2 27 25 29   * 143* 147*            Procedures/imaging: see electronic medical records for all procedures, Xrays and details which were not copied into this note but were reviewed prior to creation of Plan          Assessment & Plan:     As above       Av Borden MD  12/16/2019  11:35 AM

## 2019-12-16 NOTE — PROGRESS NOTES
Reason for Admission:   Elevated troponin [R79.89]  Dyspnea [R06.00]               RRAT Score:     30             Resources/supports as identified by patient/family:       Top Challenges facing patient (as identified by patient/family and CM): Finances/Medication cost?     No needs  Transportation      daughter  Support system or lack thereof?   family  Living arrangements? Lives with    Self-care/ADLs/Cognition? Alert and oriented. Patient is paraplegic          Current Advanced Directive/Advance Care Plan:   no                          Plan for utilizing home health:    yes                      Likelihood of readmission:   HIGH    Transition of Care Plan:                    Initial assessment completed with patient. Cognitive status of patient: oriented to time, place, person and situation. Face sheet information confirmed:  yes. The patient designates Spouse Sharmila Bearden [phone] to participate in her discharge plan and to receive any needed information. This patient lives in a single family home with patient and . Patient is not able to navigate steps as needed. Prior to hospitalization, patient was considered to be independent with ADLs/IADLS : no . If not independent,  patient needs assist with : dressing, bathing, food preparation, cooking and toileting    Patient has a current ACP document on file: no  The patient and  will be available to transport patient home upon discharge. The patient already has [employer] sliding board  medical equipment available in the home. Patient is not currently active with home health. Patient has stayed in a skilled nursing facility or rehab. Was  stay within last 60 days : no. This patient is on dialysis :no    List of available Home Health agencies were provided and reviewed with the patient prior to discharge. Freedom of choice signed: yes, for [facility].  Currently, the discharge plan is Home with Home Health. The patient states that she can obtain her medications from the pharmacy, and take her medications as directed. Patient's current insurance is [employer] and Medicare. Care Management Interventions  PCP Verified by CM:  Yes  Mode of Transport at Discharge: Self  Transition of Care Consult (CM Consult): [address]: Yes  Physical Therapy Consult: Yes  Occupational Therapy Consult: Yes  Current Support Network: Lives with Spouse  Confirm Follow Up Transport: Family  Plan discussed with Pt/Family/Caregiver: Yes  Freedom of Choice Offered: Yes  Discharge Location  Discharge Placement: Home with home health        Trae Morataya RN BSN  Care Manager  [phone]

## 2019-12-16 NOTE — PROGRESS NOTES
Per echo tech, [employer], definity was given, but not scanned into MAR. I scanned the definity in the [facility] for given date and time 12/13/19 @ 1400.

## 2019-12-16 NOTE — ROUTINE PROCESS
Bedside shift change report given DANIEL Villa (oncoming nurse) by Anthony Parker RN (offgoing nurse). Report included the following information SBAR, Kardex, Intake/Output, Recent Results and Cardiac Rhythm NSR/paced. ]

## 2019-12-16 NOTE — DISCHARGE SUMMARY
Discharge Summary      Patient: Kimberley Owens MRN: [id number]  CSN: [id number]    YOB: 1950  Age: 71 y.o. Sex: female    DOA: 12/12/2019 LOS:  LOS: 5 days   Discharge Date: 12/16/19     Admission Diagnoses: Elevated troponin [R79.89]  Dyspnea [R06.00]    Discharge Diagnoses:    1. Acute on chronic systolic CHF  2. LISBETH on CKD Stage III   3. HTN   4. Type II DM  5. Hypothyroidism  6. Hx suprapubic catheter- exchanged 12/12 by urology   7. Hx paraplegia  8. Chronic back spams   9. Itching     Discharge Condition: Stable    PHYSICAL EXAM  Visit Vitals  /66 (BP 1 Location: Left arm, BP Patient Position: At rest)   Pulse 76   Temp 97.4 °F (36.3 °C)   Resp 16   Ht 5' 7\" (1.702 m)   Wt 110 kg (242 lb 6.4 oz)   SpO2 99%   Breastfeeding No   BMI 37.97 kg/m²     General:         Alert, cooperative, no acute distress    HEENT:           NC, Atraumatic. Anicteric sclerae. Lungs:            Diminished sounds bilaterally, CTAB   Heart:              Regular  rhythm,  No murmur, No Rubs, No Gallops  Abdomen:      Soft, Non distended, mild tenderness in epigastrium and LUQ;  +Bowel sounds, + suprapubic catheter   Extremities:   Trace edema to the mid-shin, R heel with clean, dry bandage in place    Psych:              Good insight. Not anxious or agitated  Neurologic:     Alert and oriented X 3; 5/5 strength X 4 extremities        Hospital Course:   Ms. Neda Meza is a 78-year-old female with a past medical history of systolic CHF with PPM, paraplegia status post ruptured hematoma in L-spine, HTN, type II DM, chronic right heel ulcer and suprapubic catheter who presented to the ED with increased shortness of breath and orthopnea. Rales were present on exam along with bilateral pitting edema and an open ulcer on her right heel. Work-up was significant for NT proBNP 3357, and negative troponin x3. Ms. Karin García was then treated with IV Lasix with effective diuresis.   Nephrology and cardiology were consulted and she was admitted with acute on chronic systolic CHF. Overnight her breathing improved but was not back to baseline. An echocardiogram was done and showed an EF of 20 to 25%, slightly worse than her previous echo done in 5/19 which showed an EF of 26 to 30%. Nephrology and cardiology made recommendations for continued diuresis in her setting of CKD stage III. Over the next couple days Ms. Gonzalez diuresed well and her breathing returned to baseline. On the day of anticipated discharge, she complained of left upper quadrant and left lower chest pain. A CT chest abdomen pelvis was done on 12/16 and showed abnormalities but no specific reason for her pain. The CT was also reviewed by cardiology and the radiologist, and showed that her pacemaker and the pocket was intact. Overnight Ms. Gonzalez's pain resolved without intervention. Her breathing was at baseline and she remained hemodynamically stable. She was then discharged to home with home health for continued PT, OT, and wound care. She should follow-up with her PCP in 1 week and with her cardiologist within the next 2 to 4 weeks. Consults:   Cardiology- Nathalie Boyd; Dr. Catalina Patel MD   Nephrology- Dr. Bronson Resendiz MD     Significant Diagnostic Studies:     Echo 12/13/19: Image quality for this study was poor. · Definity contrast was given to enhance imaging. · Normal wall thickness. Mildly dilated left ventricle. Severe systolic dysfunction. Estimated left ventricular ejection fraction is 20 - 25%. Visually measured ejection fraction. Left ventricular global hypokinesis. Pulmonary arterial systolic pressure is 26 mmHg. CT Chest Abd Pelv 12/16/19: Nonspecific hyperdense foci noted within the liver and spleen. Findings appear to be likely chronic. It may be sequela of remote injury and/or infection. Atherosclerosis. Renal atrophy. IVC filter. Hiatal hernia. Question thyroid nodularity.  Ultrasound can be obtained for clarification. Discharge Medications:  Current Discharge Medication List      CONTINUE these medications which have CHANGED    Details   furosemide (LASIX) 40 mg tablet Take 1.5 Tabs by mouth daily. Qty: 30 Tab, Refills: 0      insulin glargine (LANTUS U-100 INSULIN) 100 unit/mL injection 10 Units by SubCUTAneous route nightly. Indications: type 2 diabetes mellitus  Qty: 10 Vial, Refills: 0    Associated Diagnoses: Type 2 diabetes mellitus with diabetic neuropathy, with long-term current use of insulin (HCC)         CONTINUE these medications which have NOT CHANGED    Details   ARMOUR THYROID 30 mg tablet Take 1 Tab by mouth Daily (before breakfast). Refills: 0      carvedilol (COREG) 6.25 mg tablet Take 1 Tab by mouth two (2) times a day. Qty: 180 Tab, Refills: 3      sacubitril-valsartan (ENTRESTO) 97 mg/103 mg tablet Take 1 Tab by mouth two (2) times a day. Qty: 180 Tab, Refills: 3      exenatide microspheres (BYDUREON) 2 mg serr 2 mg by SubCUTAneous route every seven (7) days. multivitamin (ONE A DAY) tablet Take 1 Tab by mouth daily. metFORMIN ER (GLUCOPHAGE XR) 500 mg tablet Take 500 mg by mouth daily. cholecalciferol, VITAMIN D3, (VITAMIN D3) 5,000 unit tab tablet Take  by mouth daily. gabapentin (NEURONTIN) 300 mg capsule Take 2 Caps by mouth two (2) times a day.  Indications: NEUROPATHIC PAIN  Qty: 100 Cap, Refills: 0              Activity: activity as tolerated    Diet: Cardiac, low-sodium       Follow-up Information     Follow up With Specialties Details Why Contact Info    Nohemi Peck MD Internal Medicine Schedule an appointment as soon as possible for a visit in 1 week F/U  [address] [place] [address] 25[address],  Cardiology Schedule an appointment as soon as possible for a visit in 3 weeks F/U  [address]  [phone]             Minutes spent on discharge: >30 minutes spent coordinating this discharge (review instructions/follow-up, prescriptions, preparing report for sign off)          COURT Leon, DO   December 17, 2019         [facility] 1/14/20 for CDMP Query     Dx   LISBETH on CKD Stage III-  CKD most likely 2/2 HTN and DM although there could be other contributing factors     COURT Leon, DO   January 14, 2020

## 2019-12-16 NOTE — PROGRESS NOTES
Repeat page @ 32 16 66    Paged hospitalist service at [phone] to clarify orders in regard to CRF/ contrast.

## 2019-12-16 NOTE — PROGRESS NOTES
Progress Note         Patient: José Clemente MRN: [id number]  CSN: [id number]    YOB: 1950  Age: 71 y.o. Sex: female    DOA: 12/12/2019 LOS:  LOS: 4 days                    Subjective:     José Clemente is a 71 y.o. female with a PMHx of systolic CHF w/PPM, paraplegia s/p ruptured hematoma in L spine, HTN, Type II DM, chronic R heel ulcer and suprapubic cathter who is admitted for acute on chronic systolic CHF. C/o pain to LUQ abd, L lower chest; described as pins and needles sharp pain that comes on at rest, goes away w/o intervention   Breathing improved, close to baseline      Objective:     Physical Exam:  Visit Vitals  /66 (BP 1 Location: Left arm, BP Patient Position: At rest)   Pulse 76   Temp 98 °F (36.7 °C)   Resp 16   Ht 5' 7\" (1.702 m)   Wt 108.4 kg (239 lb)   SpO2 100%   Breastfeeding No   BMI 37.43 kg/m²        General:         Alert, cooperative, no acute distress    HEENT: NC, Atraumatic. Anicteric sclerae. Lungs: Diminished sounds bilaterally, CTAB   Heart:  Regular  rhythm,  No murmur, No Rubs, No Gallops  Abdomen: Soft, Non distended, mild tenderness in epigastrium and LUQ;  +Bowel sounds, + suprapubic catheter   Extremities: Trace edema to the mid-shin, R heel with clean, dry bandage in place    Psych:   Good insight. Not anxious or agitated  Neurologic:  Alert and oriented X 3; 5/5 strength X 4 extremities     Intake and Output:  Current Shift:  No intake/output data recorded.   Last three shifts:  12/14 1901 - 12/16 0700  In: 240 [P.O.:240]  Out: 2500 [Urine:2500]    Labs: Results:       Chemistry Recent Labs     12/16/19  0529 12/15/19  0311 12/14/19  0144   * 143* 147*    137 140   K 4.2 3.7 4.2    102 106   CO2 27 25 29   BUN 54* 57* 52*   CREA 1.98* 2.24* 2.34*   CA 8.9 8.5 9.1   AGAP 5 10 5   BUCR 27* 25* 22*      CBC w/Diff Recent Labs     12/16/19  0529 12/15/19  0311 12/14/19  0144   WBC 4.8 5.0 5.1   RBC 4.31 4.12* 4.27   HGB 11.9* 11.3* 11.8*   HCT 35.3 33.5* 35.1    160 160   GRANS 59 52 44   LYMPH 30 35 41   EOS 4 4 4      Cardiac Enzymes No results for input(s): CPK, CKND1, CARLI in the last 72 hours. No lab exists for component: CKRMB, TROIP   Coagulation No results for input(s): PTP, INR, APTT, INREXT, INREXT in the last 72 hours. Lipid Panel Lab Results   Component Value Date/Time    Cholesterol, total 127 08/17/2018 02:40 AM    HDL Cholesterol 42 08/17/2018 02:40 AM    LDL, calculated 61.6 08/17/2018 02:40 AM    VLDL, calculated 23.4 08/17/2018 02:40 AM    Triglyceride 117 08/17/2018 02:40 AM    CHOL/HDL Ratio 3.0 08/17/2018 02:40 AM      BNP No results for input(s): BNPP in the last 72 hours. Liver Enzymes No results for input(s): TP, ALB, TBIL, AP, SGOT, GPT in the last 72 hours. No lab exists for component: DBIL   Thyroid Studies Lab Results   Component Value Date/Time    TSH 0.29 (L) 12/14/2019 12:55 PM                Assessment and Plan:     Ramsey Garcia is a 71 y.o. female with a PMHx of systolic CHF w/PPM, paraplegia s/p ruptured hematoma in L spine, HTN, Type II DM, chronic R heel ulcer and suprapubic cathter who is admitted for acute on chronic systolic CHF. Condition is improved but not baseline. 1. Acute on chronic systolic CHF- Echo 88/17 showed EF 20-25%  2. LISBETH on CKD Stage III   3. HTN   4. Type II DM- HbA1c 5.9 12/12/19   5. Hypothyroidism- TSH is a bit low; will let PCP make adjustments  6. Hx suprapubic catheter- exchanged 12/12 by urology   7. Hx paraplegia  8. Chronic back spams   9.  Itching     Nephrology consulted, appreciate recommendations   Cardiology consulted, appreciate recommendations   Will get CT Chest/Abd for c/o pt's LUQ/L lower chest pain   Cont PO lasix home dose   Monitor BMP, CBC  Hold Entresto for now   Cont home meds: carvedilol   Cont home lantus, SSI w/accuchecks   Cont home meds: gabapentin, armour   Flexeril prn, hydroxyzine prn, zofran prn  PT, OT     PT recommends [place] ADDENDUM: CT shows no explanation for pt's pain; Cardiology note acknowledged; cardilogy reviewed with radiologist, pacemaker and pocket intact   Will discharge pt in am as she is still not feeling good, still having pain. Case discussed with:  [x]Patient  [x]Family  [x]Nursing  []Case Management  DVT prophylaxis: SQH   Diet: Cardiac   Code Status: FULL   Disposition: Cont care in tele, home tomorrow       COURT Laguna, DO  12/16/2019

## 2019-12-16 NOTE — PROGRESS NOTES
Home health orders noted. Orders sent to [facility] care, office called, patient put in que.     Kali Urena, RN BSN  Care Manager  [phone]

## 2019-12-16 NOTE — ROUTINE PROCESS
Bedside shift change report given to Mark Frederick (oncoming nurse) by Dominga Merrill RN (offgoing nurse). Report included the following information SBAR, Kardex, Intake/Output, Recent Results and Cardiac Rhythm NSR/paced. ]

## 2019-12-16 NOTE — PROGRESS NOTES
[facility]  -  Progress Note      Patient: Kerrie Amaya MRN: [id number]  SSN: [id number]    YOB: 1950  Age: 71 y.o. Sex: female      Admit Date: 12/12/2019    Assessment:     Hospital Problems  Date Reviewed: 9/16/2019          Codes Class Noted POA    * (Principal) Systolic CHF, acute on chronic (HCC) ICD-10-CM: I50.23  ICD-9-CM: 428.23, 428.0  12/13/2019 Yes        Dyspnea ICD-10-CM: R06.00  ICD-9-CM: 786.09  12/12/2019 Yes        Elevated troponin ICD-10-CM: R79.89  ICD-9-CM: 790.6  12/12/2019 Yes        Heel ulcer ([facility] ICD-10-CM: L97.409  ICD-9-CM: 707.14  10/17/2018 Yes        Nonischemic cardiomyopathy ([facility] (Chronic) ICD-10-CM: I42.8  ICD-9-CM: 425.4  6/28/2011 Yes        Type 2 diabetes mellitus with diabetic neuropathy ([facility]) (Chronic) ICD-10-CM: E11.40  ICD-9-CM: 250.60, 357.2  6/28/2011 Yes            -Acute on chronic systolic heart failure.  Significantly improved with IV diuretic. NT proBNP level approaching baseline.  Ejection fraction remains severely depressed, EF 20 to 25% on repeat echocardiogram this admission.  -Chronic kidney disease. Creatinine worsening since last month, but appears creatinine 1.4-1.6 over the previous few months.  Increased creatinine noted, however this has stabilized. -Severe nonischemic cardiomyopathy. EF 26-30% on echo 5/2019. widely patent coronaries 8/1996. Nuclear stress 6/2015 without ischemia. -Biventricular PPM 10/2012 (in abdomen) with generator change out 2/2019.  She previously had a biventricular ICD but it was removed due to infection at the site.  Normal device function on interrogation, no significant arrhythmias.  -Hypertension. Low normal BP.  -H/o suprapubic catheter. Recent change out. Increased tenderness suprapubic area. -H/o recurrent infections.  -Paraplegia.  Fall 4/2017 resulting in trauma to her back developing an infected right psoas hematoma with development of an epidural abscess with neurological compromise with resulting paraplegia.  -History of hypothyroidism. On replacement. Checked TSH level yesterday which was a little low.     Primary cardiologist Dr. Val Shanks:     Stable and doing better with improved symptoms. Would continue with present medication regimen and anticipate home today if all other teams agree. Patient can have follow up in office in 3-4 weeks for recheck with Dr. Charo Nava    Subjective:     No new complaints.  She states that her breathing is improved and near baseline    Objective:      Patient Vitals for the past 8 hrs:   Temp Pulse Resp BP SpO2   12/16/19 0720 97.3 °F (36.3 °C) 71 18 116/66 100 %   12/16/19 0521 97.3 °F (36.3 °C) 72 18 117/70 95 %         Patient Vitals for the past 96 hrs:   Weight   12/16/19 0648 108.4 kg (239 lb)   12/15/19 0509 110.2 kg (243 lb)   12/14/19 0401 107.9 kg (237 lb 12.8 oz)   12/13/19 1350 108 kg (238 lb)   12/13/19 0430 108 kg (238 lb)   12/12/19 2236 108 kg (238 lb)         Intake/Output Summary (Last 24 hours) at 12/16/2019 0918  Last data filed at 12/16/2019 0520  Gross per 24 hour   Intake    Output 1750 ml   Net -1750 ml       Physical Exam:  General:  alert, cooperative, no distress, appears stated age  Neck:  nontender, no JVD  Lungs:  diminished breath sounds bases  Heart:  regular rate and rhythm, S1, S2 normal, no murmur, click, rub or gallop  Extremities:  extremities normal, atraumatic, no cyanosis or edema    Data Review:     Labs: Results:       Chemistry Recent Labs     12/16/19  0529 12/15/19  0311 12/14/19  0144   * 143* 147*    137 140   K 4.2 3.7 4.2    102 106   CO2 27 25 29   BUN 54* 57* 52*   CREA 1.98* 2.24* 2.34*   CA 8.9 8.5 9.1   AGAP 5 10 5   BUCR 27* 25* 22*      CBC w/Diff Recent Labs     12/16/19  0529 12/15/19  0311 12/14/19  0144   WBC 4.8 5.0 5.1   RBC 4.31 4.12* 4.27   HGB 11.9* 11.3* 11.8*   HCT 35.3 33.5* 35.1    160 160   GRANS 59 52 44   LYMPH 30 35 41   EOS 4 4 4      Cardiac Enzymes No results found for: CPK, CK, CKMMB, CKMB, RCK3, CKMBT, CKNDX, CKND1, CARLI, TROPT, TROIQ, AMBERLY, TROPT, TNIPOC, BNP, BNPP   Coagulation No results for input(s): PTP, INR, APTT, INREXT in the last 72 hours. Lipid Panel Lab Results   Component Value Date/Time    Cholesterol, total 127 08/17/2018 02:40 AM    HDL Cholesterol 42 08/17/2018 02:40 AM    LDL, calculated 61.6 08/17/2018 02:40 AM    VLDL, calculated 23.4 08/17/2018 02:40 AM    Triglyceride 117 08/17/2018 02:40 AM    CHOL/HDL Ratio 3.0 08/17/2018 02:40 AM      BNP Lab Results   Component Value Date/Time    B-type Natriuretic Peptide 111.9 (H) 11/19/2013 10:17 AM      Liver Enzymes No results for input(s): TP, ALB, TBIL, AP, SGOT, GPT in the last 72 hours.     No lab exists for component: DBIL   Digoxin    Thyroid Studies Lab Results   Component Value Date/Time    TSH 0.29 (L) 12/14/2019 12:55 PM

## 2019-12-16 NOTE — PROGRESS NOTES
I reviewed CT with radiologist, pacemaker and pocket intact. No evidence fluid collection. Ok to discharge from cardiac standpoint.

## 2019-12-17 VITALS
TEMPERATURE: 97.4 F | BODY MASS INDEX: 38.04 KG/M2 | HEIGHT: 67 IN | RESPIRATION RATE: 16 BRPM | SYSTOLIC BLOOD PRESSURE: 131 MMHG | WEIGHT: 242.4 LBS | HEART RATE: 76 BPM | DIASTOLIC BLOOD PRESSURE: 66 MMHG | OXYGEN SATURATION: 99 %

## 2019-12-17 LAB
ANION GAP SERPL CALC-SCNC: 5 MMOL/L (ref 3–18)
BASOPHILS # BLD: 0 K/UL (ref 0–0.1)
BASOPHILS NFR BLD: 1 % (ref 0–2)
BUN SERPL-MCNC: 56 MG/DL (ref 7–18)
BUN/CREAT SERPL: 33 (ref 12–20)
CALCIUM SERPL-MCNC: 9.1 MG/DL (ref 8.5–10.1)
CHLORIDE SERPL-SCNC: 107 MMOL/L (ref 100–111)
CO2 SERPL-SCNC: 28 MMOL/L (ref 21–32)
CREAT SERPL-MCNC: 1.68 MG/DL (ref 0.6–1.3)
DIFFERENTIAL METHOD BLD: ABNORMAL
EOSINOPHIL # BLD: 0.2 K/UL (ref 0–0.4)
EOSINOPHIL NFR BLD: 5 % (ref 0–5)
ERYTHROCYTE [DISTWIDTH] IN BLOOD BY AUTOMATED COUNT: 13.9 % (ref 11.6–14.5)
GLUCOSE BLD STRIP.AUTO-MCNC: 102 MG/DL (ref 70–110)
GLUCOSE BLD STRIP.AUTO-MCNC: 131 MG/DL (ref 70–110)
GLUCOSE SERPL-MCNC: 108 MG/DL (ref 74–99)
HCT VFR BLD AUTO: 34.6 % (ref 35–45)
HGB BLD-MCNC: 11.6 G/DL (ref 12–16)
LYMPHOCYTES # BLD: 1.5 K/UL (ref 0.9–3.6)
LYMPHOCYTES NFR BLD: 36 % (ref 21–52)
MCH RBC QN AUTO: 27.6 PG (ref 24–34)
MCHC RBC AUTO-ENTMCNC: 33.5 G/DL (ref 31–37)
MCV RBC AUTO: 82.2 FL (ref 74–97)
MONOCYTES # BLD: 0.4 K/UL (ref 0.05–1.2)
MONOCYTES NFR BLD: 9 % (ref 3–10)
NEUTS SEG # BLD: 2.1 K/UL (ref 1.8–8)
NEUTS SEG NFR BLD: 49 % (ref 40–73)
PLATELET # BLD AUTO: 167 K/UL (ref 135–420)
PMV BLD AUTO: 10.3 FL (ref 9.2–11.8)
POTASSIUM SERPL-SCNC: 4.3 MMOL/L (ref 3.5–5.5)
RBC # BLD AUTO: 4.21 M/UL (ref 4.2–5.3)
SODIUM SERPL-SCNC: 140 MMOL/L (ref 136–145)
WBC # BLD AUTO: 4.3 K/UL (ref 4.6–13.2)

## 2019-12-17 PROCEDURE — 85025 COMPLETE CBC W/AUTO DIFF WBC: CPT

## 2019-12-17 PROCEDURE — 36415 COLL VENOUS BLD VENIPUNCTURE: CPT

## 2019-12-17 PROCEDURE — 74011250636 HC RX REV CODE- 250/636: Performed by: HOSPITALIST

## 2019-12-17 PROCEDURE — 74011250637 HC RX REV CODE- 250/637: Performed by: INTERNAL MEDICINE

## 2019-12-17 PROCEDURE — 82962 GLUCOSE BLOOD TEST: CPT

## 2019-12-17 PROCEDURE — 74011250637 HC RX REV CODE- 250/637: Performed by: FAMILY MEDICINE

## 2019-12-17 PROCEDURE — 74011250637 HC RX REV CODE- 250/637: Performed by: HOSPITALIST

## 2019-12-17 PROCEDURE — 80048 BASIC METABOLIC PNL TOTAL CA: CPT

## 2019-12-17 RX ORDER — TRAMADOL HYDROCHLORIDE 50 MG/1
50 TABLET ORAL
Status: DISCONTINUED | OUTPATIENT
Start: 2019-12-17 | End: 2019-12-17

## 2019-12-17 RX ORDER — TRAMADOL HYDROCHLORIDE 50 MG/1
50 TABLET ORAL
Status: COMPLETED | OUTPATIENT
Start: 2019-12-17 | End: 2019-12-17

## 2019-12-17 RX ADMIN — CYCLOBENZAPRINE HYDROCHLORIDE 5 MG: 5 TABLET, FILM COATED ORAL at 00:09

## 2019-12-17 RX ADMIN — CARVEDILOL 6.25 MG: 6.25 TABLET, FILM COATED ORAL at 08:33

## 2019-12-17 RX ADMIN — DOCUSATE SODIUM 100 MG: 100 CAPSULE, LIQUID FILLED ORAL at 08:34

## 2019-12-17 RX ADMIN — HEPARIN SODIUM 5000 UNITS: 5000 INJECTION INTRAVENOUS; SUBCUTANEOUS at 05:16

## 2019-12-17 RX ADMIN — LEVOTHYROXINE, LIOTHYRONINE 30 MG: 19; 4.5 TABLET ORAL at 05:16

## 2019-12-17 RX ADMIN — FUROSEMIDE 60 MG: 40 TABLET ORAL at 08:33

## 2019-12-17 RX ADMIN — GABAPENTIN 300 MG: 300 CAPSULE ORAL at 08:34

## 2019-12-17 RX ADMIN — Medication 10 ML: at 05:45

## 2019-12-17 RX ADMIN — TRAMADOL HYDROCHLORIDE 50 MG: 50 TABLET, FILM COATED ORAL at 04:02

## 2019-12-17 NOTE — PROGRESS NOTES
Complains of spasms to abdomen, flexeril already given. MD contacted who ordered a one time dose of Tramadol.

## 2019-12-17 NOTE — PROGRESS NOTES
Discharge order noted for today. Pt has been accepted to [facility] agency. Met with patient and daughter and are agreeable to the transition plan today. Transport has been arranged through  in wheelchair van. Patient's discharge summary and home health  orders have been forwarded to [address] [place] [address] health  agency via [facility] Updated bedside RN, Yamil Thapa,  to the transition plan. Discharge information has been documented on the AVS. Orders received for Bariatric drop bedside commode. First choice consulted for order.     Richard Delacruz RN BSN  Care Manager  [phone]

## 2019-12-17 NOTE — PROGRESS NOTES
[facility] - Progress Note  Admit Date: 12/12/2019    Assessment:     -Acute on chronic systolic heart failure.  Significantly improved with IV diuretic.  NT proBNP level approaching baseline.  Ejection fraction remains severely depressed, EF 20 to 25% on repeat echocardiogram this admission.  -Pain at pacemaker site, CT unremarkable  -Chronic kidney disease. Creatinine worsening since last month, but appears creatinine 1.4-1.6 over the previous few months.  Increased creatinine noted, however this has stabilized. -Severe nonischemic cardiomyopathy. EF 26-30% on echo 5/2019. widely patent coronaries 8/1996. Nuclear stress 6/2015 without ischemia. -Biventricular PPM 10/2012 (in abdomen) with generator change out 2/2019.  She previously had a biventricular ICD but it was removed due to infection at the site.  Normal device function on interrogation, no significant arrhythmias.  -Hypertension. Low normal BP.  -H/o suprapubic catheter. Recent change out. Increased tenderness suprapubic area. -H/o recurrent infections.  -Paraplegia. Fall 4/2017 resulting in trauma to her back developing an infected right psoas hematoma with development of an epidural abscess with neurological compromise with resulting paraplegia.  -History of hypothyroidism.  On replacement.  Checked TSH level yesterday which was a little low.     Primary cardiologist Dr. Tushar Lopez:     CT unremarkable around pacemaker. No further cardiac workup, please call if questions. Followup Dr. Kevin Fischer. Subjective:     No new complaints.      Objective:      Patient Vitals for the past 8 hrs:   Temp Pulse Resp BP SpO2   12/17/19 0717 97.4 °F (36.3 °C) 81 16 111/71 99 %   12/17/19 0344 97.3 °F (36.3 °C) 77 18 117/71 99 %         Patient Vitals for the past 96 hrs:   Weight   12/17/19 0405 110 kg (242 lb 6.4 oz)   12/16/19 0648 108.4 kg (239 lb)   12/15/19 0509 110.2 kg (243 lb)   12/14/19 0401 107.9 kg (237 lb 12.8 oz)   12/13/19 1350 108 kg (238 lb)                    Intake/Output Summary (Last 24 hours) at 12/17/2019 0939  Last data filed at 12/17/2019 0935  Gross per 24 hour   Intake 240 ml   Output 1000 ml   Net -760 ml       Physical Exam:  General:  alert, cooperative, no distress, appears stated age  Neck:  nontender  Lungs:  clear to auscultation bilaterally  Heart:  regular rate and rhythm, S1, S2 normal, no murmur, click, rub or gallop  Abdomen:  abdomen is soft, pacer pocket intact  Extremities:  extremities normal, atraumatic, no cyanosis or edema    Data Review:     Labs: Results:       Chemistry Recent Labs     12/17/19  0516 12/16/19  0529 12/15/19  0311   * 145* 143*    139 137   K 4.3 4.2 3.7    107 102   CO2 28 27 25   BUN 56* 54* 57*   CREA 1.68* 1.98* 2.24*   CA 9.1 8.9 8.5   AGAP 5 5 10   BUCR 33* 27* 25*      CBC w/Diff Recent Labs     12/17/19  0516 12/16/19  0529 12/15/19  0311   WBC 4.3* 4.8 5.0   RBC 4.21 4.31 4.12*   HGB 11.6* 11.9* 11.3*   HCT 34.6* 35.3 33.5*    146 160   GRANS 49 59 52   LYMPH 36 30 35   EOS 5 4 4      Cardiac Enzymes No results found for: CPK, CK, CKMMB, CKMB, RCK3, CKMBT, CKNDX, CKND1, CARLI, TROPT, TROIQ, AMBERLY, TROPT, TNIPOC, BNP, BNPP   Coagulation No results for input(s): PTP, INR, APTT, INREXT in the last 72 hours. Lipid Panel Lab Results   Component Value Date/Time    Cholesterol, total 127 08/17/2018 02:40 AM    HDL Cholesterol 42 08/17/2018 02:40 AM    LDL, calculated 61.6 08/17/2018 02:40 AM    VLDL, calculated 23.4 08/17/2018 02:40 AM    Triglyceride 117 08/17/2018 02:40 AM    CHOL/HDL Ratio 3.0 08/17/2018 02:40 AM      BNP Lab Results   Component Value Date/Time    B-type Natriuretic Peptide 111.9 (H) 11/19/2013 10:17 AM      Liver Enzymes No results for input(s): TP, ALB, TBIL, AP, SGOT, GPT in the last 72 hours.     No lab exists for component: DBIL   Digoxin    Thyroid Studies Lab Results   Component Value Date/Time    TSH 0.29 (L) 12/14/2019 12:55 PM Signed By: Fely Aguilar MD     December 17, 2019

## 2019-12-17 NOTE — ROUTINE PROCESS
Bedside and Verbal shift change report given to Aleksandr Bishop (oncoming nurse) by guzman JENKINS (offgoing nurse). Report included the following information Kardex, Intake/Output and MAR.

## 2019-12-17 NOTE — ROUTINE PROCESS
Bedside shift change report given to Lindsay Gimenez RN (oncoming nurse) by Alannah Romero RN (offgoing nurse). Report included the following information SBAR, Kardex, Intake/Output, Recent Results and Cardiac Rhythm NSR/paced. ]

## 2019-12-17 NOTE — HOME CARE
Discharge noted for today. Received home health referral for [facility] for SN, PT, and OT. Spoke with patient, explained services and answered all questions. Demographics verified. Order processed and emailed to central office. Patient has the following DME: electric scooter. She requested a drop arm BSC.  Lars Thomson and LUCI Pulido informed.   Santosh Kunz, [facility] Liaison

## 2019-12-17 NOTE — PROGRESS NOTES
Important Message from [facility]" reviewed and explained with the patient and/or representative at bedside and signature was obtained. A signed copy provided to patient/representative. Original signed document placed in patient's chart.

## 2019-12-18 LAB
BACTERIA SPEC CULT: NORMAL
BACTERIA SPEC CULT: NORMAL
SERVICE CMNT-IMP: NORMAL
SERVICE CMNT-IMP: NORMAL

## 2019-12-19 ENCOUNTER — HOME CARE VISIT (OUTPATIENT)
Dept: SCHEDULING | Facility: HOME HEALTH | Age: 69
End: 2019-12-19
Payer: COMMERCIAL

## 2019-12-19 PROCEDURE — G0299 HHS/HOSPICE OF RN EA 15 MIN: HCPCS

## 2019-12-19 PROCEDURE — 400013 HH SOC

## 2019-12-20 ENCOUNTER — HOME CARE VISIT (OUTPATIENT)
Dept: HOME HEALTH SERVICES | Facility: HOME HEALTH | Age: 69
End: 2019-12-20
Payer: COMMERCIAL

## 2019-12-23 ENCOUNTER — HOME CARE VISIT (OUTPATIENT)
Dept: SCHEDULING | Facility: HOME HEALTH | Age: 69
End: 2019-12-23
Payer: COMMERCIAL

## 2019-12-23 VITALS — HEART RATE: 76 BPM | DIASTOLIC BLOOD PRESSURE: 82 MMHG | OXYGEN SATURATION: 100 % | SYSTOLIC BLOOD PRESSURE: 141 MMHG

## 2019-12-23 VITALS
DIASTOLIC BLOOD PRESSURE: 67 MMHG | OXYGEN SATURATION: 100 % | RESPIRATION RATE: 18 BRPM | HEART RATE: 74 BPM | TEMPERATURE: 96.4 F | SYSTOLIC BLOOD PRESSURE: 122 MMHG

## 2019-12-23 PROCEDURE — G0152 HHCP-SERV OF OT,EA 15 MIN: HCPCS

## 2019-12-23 PROCEDURE — G0151 HHCP-SERV OF PT,EA 15 MIN: HCPCS

## 2019-12-24 ENCOUNTER — HOME CARE VISIT (OUTPATIENT)
Dept: SCHEDULING | Facility: HOME HEALTH | Age: 69
End: 2019-12-24
Payer: COMMERCIAL

## 2019-12-24 PROCEDURE — G0300 HHS/HOSPICE OF LPN EA 15 MIN: HCPCS

## 2019-12-26 VITALS
RESPIRATION RATE: 16 BRPM | OXYGEN SATURATION: 98 % | TEMPERATURE: 97.7 F | SYSTOLIC BLOOD PRESSURE: 130 MMHG | HEART RATE: 77 BPM | DIASTOLIC BLOOD PRESSURE: 78 MMHG

## 2019-12-27 ENCOUNTER — HOME CARE VISIT (OUTPATIENT)
Dept: SCHEDULING | Facility: HOME HEALTH | Age: 69
End: 2019-12-27
Payer: COMMERCIAL

## 2019-12-27 ENCOUNTER — HOME CARE VISIT (OUTPATIENT)
Dept: HOME HEALTH SERVICES | Facility: HOME HEALTH | Age: 69
End: 2019-12-27
Payer: COMMERCIAL

## 2019-12-27 VITALS
OXYGEN SATURATION: 99 % | SYSTOLIC BLOOD PRESSURE: 118 MMHG | TEMPERATURE: 97.5 F | HEART RATE: 68 BPM | DIASTOLIC BLOOD PRESSURE: 66 MMHG

## 2019-12-27 PROCEDURE — G0151 HHCP-SERV OF PT,EA 15 MIN: HCPCS

## 2019-12-30 ENCOUNTER — HOME CARE VISIT (OUTPATIENT)
Dept: SCHEDULING | Facility: HOME HEALTH | Age: 69
End: 2019-12-30
Payer: COMMERCIAL

## 2019-12-30 VITALS
OXYGEN SATURATION: 99 % | HEART RATE: 65 BPM | DIASTOLIC BLOOD PRESSURE: 86 MMHG | TEMPERATURE: 98.1 F | SYSTOLIC BLOOD PRESSURE: 129 MMHG

## 2019-12-30 PROCEDURE — G0300 HHS/HOSPICE OF LPN EA 15 MIN: HCPCS

## 2019-12-30 PROCEDURE — G0157 HHC PT ASSISTANT EA 15: HCPCS

## 2020-01-01 ENCOUNTER — HOME CARE VISIT (OUTPATIENT)
Dept: SCHEDULING | Facility: HOME HEALTH | Age: 70
End: 2020-01-01
Payer: COMMERCIAL

## 2020-01-01 VITALS
TEMPERATURE: 98 F | DIASTOLIC BLOOD PRESSURE: 70 MMHG | RESPIRATION RATE: 18 BRPM | SYSTOLIC BLOOD PRESSURE: 128 MMHG | OXYGEN SATURATION: 98 % | HEART RATE: 78 BPM

## 2020-01-01 VITALS
RESPIRATION RATE: 15 BRPM | SYSTOLIC BLOOD PRESSURE: 128 MMHG | HEART RATE: 68 BPM | DIASTOLIC BLOOD PRESSURE: 78 MMHG | TEMPERATURE: 97.2 F | OXYGEN SATURATION: 97 %

## 2020-01-01 VITALS
RESPIRATION RATE: 16 BRPM | DIASTOLIC BLOOD PRESSURE: 68 MMHG | SYSTOLIC BLOOD PRESSURE: 114 MMHG | TEMPERATURE: 97.7 F | OXYGEN SATURATION: 97 % | HEART RATE: 64 BPM

## 2020-01-01 PROCEDURE — G0300 HHS/HOSPICE OF LPN EA 15 MIN: HCPCS

## 2020-01-02 ENCOUNTER — HOSPITAL ENCOUNTER (OUTPATIENT)
Dept: LAB | Age: 70
Discharge: HOME | End: 2020-01-02
Payer: COMMERCIAL

## 2020-01-02 DIAGNOSIS — N18.30 CHRONIC KIDNEY DISEASE, STAGE III (MODERATE) (HCC): ICD-10-CM

## 2020-01-02 LAB
ALBUMIN SERPL-MCNC: 3.7 G/DL (ref 3.4–5)
ANION GAP SERPL CALC-SCNC: 7 MMOL/L (ref 3–18)
BUN SERPL-MCNC: 41 MG/DL (ref 7–18)
BUN/CREAT SERPL: 28 (ref 12–20)
CALCIUM SERPL-MCNC: 9.3 MG/DL (ref 8.5–10.1)
CHLORIDE SERPL-SCNC: 108 MMOL/L (ref 100–111)
CO2 SERPL-SCNC: 26 MMOL/L (ref 21–32)
CREAT SERPL-MCNC: 1.45 MG/DL (ref 0.6–1.3)
ERYTHROCYTE [DISTWIDTH] IN BLOOD BY AUTOMATED COUNT: 13.7 % (ref 11.6–14.5)
GLUCOSE SERPL-MCNC: 100 MG/DL (ref 74–99)
HCT VFR BLD AUTO: 35.3 % (ref 35–45)
HGB BLD-MCNC: 12.1 G/DL (ref 12–16)
MCH RBC QN AUTO: 27.7 PG (ref 24–34)
MCHC RBC AUTO-ENTMCNC: 34.3 G/DL (ref 31–37)
MCV RBC AUTO: 80.8 FL (ref 74–97)
PHOSPHATE SERPL-MCNC: 4.1 MG/DL (ref 2.5–4.9)
PLATELET # BLD AUTO: 163 K/UL (ref 135–420)
PMV BLD AUTO: 10.4 FL (ref 9.2–11.8)
POTASSIUM SERPL-SCNC: 4.1 MMOL/L (ref 3.5–5.5)
RBC # BLD AUTO: 4.37 M/UL (ref 4.2–5.3)
SODIUM SERPL-SCNC: 141 MMOL/L (ref 136–145)
WBC # BLD AUTO: 5.9 K/UL (ref 4.6–13.2)

## 2020-01-02 PROCEDURE — 85027 COMPLETE CBC AUTOMATED: CPT

## 2020-01-02 PROCEDURE — 36415 COLL VENOUS BLD VENIPUNCTURE: CPT

## 2020-01-02 PROCEDURE — 80069 RENAL FUNCTION PANEL: CPT

## 2020-01-03 ENCOUNTER — HOME CARE VISIT (OUTPATIENT)
Dept: SCHEDULING | Facility: HOME HEALTH | Age: 70
End: 2020-01-03
Payer: COMMERCIAL

## 2020-01-03 PROCEDURE — G0157 HHC PT ASSISTANT EA 15: HCPCS

## 2020-01-04 NOTE — PROGRESS NOTES
Hannah Hernandez presents today for a post-hospital follow-up. She was hospitalized from 12/12/19 through 12/16/19 after presenting to the ER with complaints of increasing shortness of breath and orthopnea. Her NT pro-BNP was noted to be 3357 and she was diagnosed with acute on chronic systolic heart failure. She was treated with IV lasix with improvement in her symptoms. Her suprapubic catheter was changed on 12/12/19 by urology. She had an echo done on 12/13/19 and it showed an EF of 20-25%, LV global hypokinesis. On day of discharge, she complained of LUQ and left lower chest pain. A CT scan of the abdomen and pelvis was negative. The pain resolved on its own. She is a 71year old female with a history of a dilated cardiomyopathy, mild left ventricular dysfunction, and widely patent coronary arteries (cardiac catheterization done in August 1996). She also has a biventricular pacemaker but not an AICD placed in October of 2012 (in abdomen). She previously had a biventricular ICD but it was removed due to infection at the site. She was last seen by Dr. Salo Parsons on July 15, 2019. A pharmacologic nuclear stress test was completed on 6/19/15 showed no reversible perfusion imaging abnormalities concerning for myocardial ischemia. In April 2017, she fell at home, traumatized her back and developed an infected right psoas hematoma with development of an epidural abscess with neurological compromise with resulting paraplegia. She had her pacemaker generator changed by Dr. Mariela Head back on February 26, 2019. She had an echocardiogram done on May 16, 2019 showed an ejection fraction 26 to 30%. She is now on Entresto 49/51 BID and remains on Lasix 40mg daily. When I saw her on 8/19/19, she was doing [place]. \"  I requested a BMP and NT pro-BNP and it showed her BUN to be 39 and Creatinine 1.67. Her NT pro-BNP was 1684.   Since that visit, she has been cleared to proceed with a urologic procedure (she was considering a suprapubic tube as she has had problems with recurrent UTIs due to her indwelling Krueger catheter). She is s/p cystoscopy and suprapubic tube placement on 9/17/19. She denies any chest pain, palpitations, and shortness of breath at rest.  She denies chest pain, tightness, heaviness, and palpitations. She denies shortness of breath at rest, admits to some dyspnea on exertion, denies orthopnea and denies PND. She denies abdominal bloating. She denies lightheadedness, dizziness, and syncope. She admits to lower extremity edema and denies claudication. Denies nausea, vomiting, diarrhea, fever, chills. She admits to joint and neuropathy pain. She admits to peripheral neuropathy. She has an infection in her right foot which appears to be healing. She states that she has a little bit more swelling in the right leg than in the left. PMH:  Past Medical History:   Diagnosis Date    Acute paraplegia ([facility] [address] 4/20/2017    Benign hypertensive heart disease with systolic CHF, NYHA class 2 ([address] 9/5/2012    Biventricular implantable cardioverter-defibrillator in situ 04/28/2005    Upgraded to BiV AICD; gen change 4/2008; pocket revision 10/2009; Abdominal - done on 8/22/2012 by Dr. Tomás Contreras Cardiac cath 08/15/1996    Patent coronaries. Elev LVEDP. EF 50-55%.  Cardiac echocardiogram 06/23/2015    Ltd study. EF 45-50%. Mild, diffuse hypk. Severe apical hypk. No mass or thrombus was clearly identified, although imaging was suboptimal.      Cardiac nuclear imaging test 06/19/2015    Fixed distal apical, distal septal defect more likely due to RV pacing than prior infarct. No ischemia. EF 46%. RWMA c/w RV pacing. Nondiagnostic EKG on pharm stress test.      Cardiovascular lower extremity venous duplex 09/04/2012    Acute, non-occlusive DVT in CFV on right. No DVT on left. No superficial thrombosis bilaterally.     Cardiovascular upper extremity venous duplex 08/27/2012    DVT in axillary vein on left. Left subclavian was not visualized.  Chronic anemia 9/5/2012    Chronic systolic heart failure (HCC)     Decreased calculated glomerular filtration rate (GFR) 3/30/2017    Calculated GFR equivalent to that of CKD stage 3 = 30-59 ml/min    Diabetic neuropathy associated with type 2 diabetes mellitus ([facility] [address] 6/28/2011    Difficult airway for intubation 08/22/2012    see anesthesia airway note    Dyslipidemia 6/28/2011    Gout     History of complete heart block 6/28/2011    History of Coumadin therapy     Anticoagulation for DVT of the LUE; Discontinued on 3/30/2017    History of deep venous thrombosis 9/5/2012    Left upper extremity    History of pyelonephritis 3/30/2017    Left bundle branch block (LBBB) on electrocardiogram 6/28/2011    Nonischemic cardiomyopathy ([facility] [address] 6/28/2011    Obesity (BMI 35.0-39.9 without comorbidity) 3/13/2017    Obstructive sleep apnea on CPAP 2/7/2012    Psoas abscess, right ([facility] [address] 4/20/2017    Psoas hematoma, right, secondary to anticoagulant therapy 3/30/2017    Type 2 diabetes mellitus with diabetic neuropathy ([facility] [address] 6/28/2011       PSH:  Past Surgical History:   Procedure Laterality Date    HX CARPAL TUNNEL RELEASE  4/07    right     HX CHOLECYSTECTOMY  1994    HX HYSTERECTOMY  1973    HX OTHER SURGICAL  6/11/2012    AICD revision    HX PACEMAKER  4/28/2005    [employer] AICD    AK REMVL PERM PM PLS GEN W/REPL PLSE GEN 2 LEAD SYS N/A 2/26/2019    REMOVE & REPLACE PPM GEN DUAL LEAD performed by Rebeka Pringle MD at [address] [place] [address] LAB       MEDS:  Current Outpatient Medications   Medication Sig    furosemide (LASIX) 40 mg tablet Take 1.5 Tabs by mouth daily.  insulin glargine (LANTUS U-100 INSULIN) 100 unit/mL injection 10 Units by SubCUTAneous route nightly. Indications: type 2 diabetes mellitus    carvedilol (COREG) 6.25 mg tablet Take 1 Tab by mouth two (2) times a day.     sacubitril-valsartan (ENTRESTO) 97 mg/103 mg tablet Take 1 Tab by mouth two (2) times a day.  exenatide microspheres (BYDUREON) 2 mg serr 2 mg by SubCUTAneous route every seven (7) days.  multivitamin (ONE A DAY) tablet Take 1 Tab by mouth daily.  metFORMIN ER (GLUCOPHAGE XR) 500 mg tablet Take 500 mg by mouth daily.  cholecalciferol, VITAMIN D3, (VITAMIN D3) 5,000 unit tab tablet Take 5,000 Units by mouth daily. take 1 tab daily of vitamin D3, 5000 units    gabapentin (NEURONTIN) 300 mg capsule Take 2 Caps by mouth two (2) times a day. Indications: NEUROPATHIC PAIN     No current facility-administered medications for this visit. Allergies and Sensitivities:  Allergies   Allergen Reactions    Latex Itching    Vancomycin Itching    Ampicillin Itching    Bactrim [Sulfamethoxazole-Trimethoprim] Itching    Blueberry Swelling     Causes throat swelling    Ciprofloxacin Itching    Codeine Other (comments)     Jumpy feeling    Crestor [Rosuvastatin] Itching    Darvocet A500 [Propoxyphene N-Acetaminophen] Itching    Demerol [Meperidine] Itching    Levaquin [Levofloxacin] Itching    Lipitor [Atorvastatin] Myalgia    Magnesium Oxide Itching     nausea    Minocin [Minocycline] Itching    Pcn [Penicillins] Itching    Pravachol [Pravastatin] Swelling     Swelling in mouth. Not allergic per patient, takes at home    Shellfish Derived Swelling     itching    Sulfa (Sulfonamide Antibiotics) Itching    Ultracet [Tramadol-Acetaminophen] Itching    Vicodin [Hydrocodone-Acetaminophen] Itching    Vytorin 10-10 [Ezetimibe-Simvastatin] Myalgia    Percodan [Oxycodone Hcl-Oxycodone-Asa] Itching       Family History:  Family History   Problem Relation Age of Onset    Cancer Father         Leukemia       Social History:  She  reports that she has never smoked. She has never used smokeless tobacco.  She  reports previous alcohol use.       Physical:  Visit Vitals  /60 (BP 1 Location: Right arm, BP Patient Position: Sitting)   Pulse 75   Resp 16 Ht 5' 7\" (1.702 m)   Wt 109.8 kg (242 lb)   SpO2 98%   BMI 37.90 kg/m²         She is unable to weigh as she is non-weight bearing and paraplegic    Exam:  Neck:  Supple, no JVD, no carotid bruits  CV:  Normal S1 and  S2, grade II/VI DEE noted along the LSB, no rubs, or gallops noted  Lungs:  Clear to ausculation throughout, no wheezes or rales  Abd:  Soft, non-tender, obese, non-distended with good bowel sounds. No hepatosplenomegaly  Extremities:  Trace to 1+ softly pitting lower extremity edema, right greater than left      Data:  EKG:   Read by Branden Leong DO. Atrially sensed and ventricularly paced rhythm with heart rate 88      LABS:  Lab Results   Component Value Date/Time    Sodium 141 01/02/2020 01:26 PM    Potassium 4.1 01/02/2020 01:26 PM    Chloride 108 01/02/2020 01:26 PM    CO2 26 01/02/2020 01:26 PM    Glucose 100 (H) 01/02/2020 01:26 PM    BUN 41 (H) 01/02/2020 01:26 PM    Creatinine 1.45 (H) 01/02/2020 01:26 PM     Lab Results   Component Value Date/Time    Cholesterol, total 127 08/17/2018 02:40 AM    HDL Cholesterol 42 08/17/2018 02:40 AM    LDL, calculated 61.6 08/17/2018 02:40 AM    Triglyceride 117 08/17/2018 02:40 AM    CHOL/HDL Ratio 3.0 08/17/2018 02:40 AM     Lab Results   Component Value Date/Time    ALT (SGPT) 40 12/13/2019 05:18 AM     Impression / Plan:  1. Non-ischemic cardiomyopathy, with Bi-ventricular pacemaker but not ICD (located in abdomen)  2. Hypertension, blood pressure well-controlled  3. Chronic systolic heart failure,  NYHA class 2-3, appears compensated   4. Diabetes, recommend Hgb A1c less than 7% from cardiac standpoint  5. Hypercholesterolemia, unable to tolerate statins  6. Fatigue  7. Recurrent UTIs, followed by urology, now with suprapubic catheter      Mrs. Jose Manuel Lo was seen today for a post-hospital follow-up. She states that since being discharged home, she has noticed some increasing edema but no shortness of breath.   She states that during her December hospitalization for heart failure, she was diuresed well and the edema in her thighs and legs had markedly improved. Now, she has noticed it increasing again. She was discharged home on Lasix 60mg daily. Her creatinine on admission was 1.9 and down to 1.6 by discharge. Her most recent BMP results are noted above. Her blood pressure is well controlled, lungs are clear, and she has 1+ lower extremity edema. She was instructed to take Lasix 80mg daily for 3 days and then back to 60mg daily. She is scheduled to see Dr. Austin Webster next week and further adjustments can be made to her Lasix at that time if needed. Congestive heart failure teaching reinforced today. Advised to limit sodium intake to no more than 2000mg per day and to also watch fluid intake. Advised to weigh daily every morning and record weights. Instructed to call our office if progressive weight gain is noted over a 2 to 3 day period of time, shortness of breath increases, or if abdominal bloating, nausea, fatigue, or increased lower extremity edema is noted. She will follow-up with Dr. Scott Sawant as scheduled and PRN. Alisa Terrazas MSN, FNP-BC    Please note:  Portions of this chart were created with Dragon medical speech to text program.  Unrecognized errors may be present.

## 2020-01-06 ENCOUNTER — HOME CARE VISIT (OUTPATIENT)
Dept: SCHEDULING | Facility: HOME HEALTH | Age: 70
End: 2020-01-06
Payer: COMMERCIAL

## 2020-01-06 VITALS
SYSTOLIC BLOOD PRESSURE: 124 MMHG | TEMPERATURE: 97.4 F | DIASTOLIC BLOOD PRESSURE: 76 MMHG | RESPIRATION RATE: 17 BRPM | HEART RATE: 61 BPM | OXYGEN SATURATION: 97 %

## 2020-01-06 PROCEDURE — G0157 HHC PT ASSISTANT EA 15: HCPCS

## 2020-01-06 PROCEDURE — G0300 HHS/HOSPICE OF LPN EA 15 MIN: HCPCS

## 2020-01-07 ENCOUNTER — OFFICE VISIT (OUTPATIENT)
Dept: CARDIOLOGY CLINIC | Age: 70
End: 2020-01-07

## 2020-01-07 VITALS
DIASTOLIC BLOOD PRESSURE: 60 MMHG | HEART RATE: 75 BPM | WEIGHT: 242 LBS | RESPIRATION RATE: 16 BRPM | SYSTOLIC BLOOD PRESSURE: 112 MMHG | OXYGEN SATURATION: 98 % | HEIGHT: 67 IN | BODY MASS INDEX: 37.98 KG/M2

## 2020-01-07 VITALS
TEMPERATURE: 97.8 F | SYSTOLIC BLOOD PRESSURE: 126 MMHG | OXYGEN SATURATION: 98 % | DIASTOLIC BLOOD PRESSURE: 78 MMHG | RESPIRATION RATE: 18 BRPM | HEART RATE: 70 BPM

## 2020-01-07 DIAGNOSIS — Z95.810 BIVENTRICULAR IMPLANTABLE CARDIOVERTER-DEFIBRILLATOR IN SITU: ICD-10-CM

## 2020-01-07 DIAGNOSIS — I11.0 BENIGN HYPERTENSIVE HEART DISEASE WITH SYSTOLIC CHF, NYHA CLASS 2 (HCC): Chronic | ICD-10-CM

## 2020-01-07 DIAGNOSIS — I50.22 CHRONIC SYSTOLIC HEART FAILURE (HCC): Primary | Chronic | ICD-10-CM

## 2020-01-07 DIAGNOSIS — E78.5 DYSLIPIDEMIA: ICD-10-CM

## 2020-01-07 DIAGNOSIS — Z86.718 HISTORY OF DEEP VENOUS THROMBOSIS: ICD-10-CM

## 2020-01-07 DIAGNOSIS — I50.20 BENIGN HYPERTENSIVE HEART DISEASE WITH SYSTOLIC CHF, NYHA CLASS 2 (HCC): Chronic | ICD-10-CM

## 2020-01-07 DIAGNOSIS — I42.8 NONISCHEMIC CARDIOMYOPATHY (HCC): ICD-10-CM

## 2020-01-07 PROCEDURE — A6212 FOAM DRG <=16 SQ IN W/BORDER: HCPCS

## 2020-01-07 PROCEDURE — A6216 NON-STERILE GAUZE<=16 SQ IN: HCPCS

## 2020-01-07 PROCEDURE — A6446 CONFORM BAND S W>=3" <5"/YD: HCPCS

## 2020-01-07 PROCEDURE — A6237 HYDROCOLLD DRG <=16 IN W/BDR: HCPCS

## 2020-01-07 PROCEDURE — A6260 WOUND CLEANSER ANY TYPE/SIZE: HCPCS

## 2020-01-07 PROCEDURE — A4452 WATERPROOF TAPE: HCPCS

## 2020-01-07 NOTE — PROGRESS NOTES
Identified pt with two pt identifiers(name and ). Reviewed record in preparation for visit and have obtained necessary documentation. Chief Complaint   Patient presents with   [facility] Follow Up     2019 - 2019 [facility]        Health Maintenance Due   Topic    Hepatitis C Screening     MICROALBUMIN Q1     EYE EXAM RETINAL OR DILATED     DTaP/Tdap/Td series (1 - Tdap)    Shingrix Vaccine Age 50> (1 of 2)    BREAST CANCER SCRN MAMMOGRAM     GLAUCOMA SCREENING Q2Y     Bone Densitometry (Dexa) Screening     Pneumococcal 65+ years (1 of 1 - PPSV23)    MEDICARE YEARLY EXAM     FOBT Q 1 YEAR AGE 54-65     Influenza Age 5 to Adult     FOOT EXAM Q1        Coordination of Care Questionnaire:  :   1) Have you been to an emergency room, urgent care, or hospitalized since your last visit? If yes, where when, and reason for visit? yes       2. Have seen or consulted any other health care provider since your last visit? If yes, where when, and reason for visit? NO      3) Do you have an Advanced Directive/ Living Will in place? NO  If yes, do we have a copy on file NO  If no, would you like information NO      Learning Assessment 2019   PRIMARY LEARNER Patient   HIGHEST LEVEL OF EDUCATION - PRIMARY LEARNER  -   BARRIERS PRIMARY LEARNER -   CO-LEARNER CAREGIVER -   PRIMARY LANGUAGE ENGLISH   LEARNER PREFERENCE PRIMARY DEMONSTRATION   ANSWERED BY patient   RELATIONSHIP SELF        3 most recent PHQ Screens 2020   Little interest or pleasure in doing things Not at all   Feeling down, depressed, irritable, or hopeless Not at all   Total Score PHQ 2 0        Abuse Screening Questionnaire 2019   Do you ever feel afraid of your partner? N   Are you in a relationship with someone who physically or mentally threatens you? N   Is it safe for you to go home? Y        Fall Risk Assessment, last 12 mths 2020   Able to walk?  No   Fall in past 12 months? -

## 2020-01-07 NOTE — PATIENT INSTRUCTIONS
Take Lasix 80mg daily for 3 days and then back to 60mg daily  Follow-up with Dr. Gali Echeverria as scheduled for 1/14/20. He can adjust Lasix if needed at that time.   Follow-up with Dr. Frances Wilkins as scheduled and as needed  Weigh daily and record if able  Limit sodium intake to 2000mg per day  Limit fluid intake to no more than  6, eight ounce glasses of any type of fluids per day (total of 48 ounces per day)  Call if you notice sudden or progressive weight gain (3-5 pounds in 2-3 days), increasing shortness of breath, abdominal bloating, increasing lower extremity edema, inability to lie flat or on your normal number of pillows, having to sit up to catch your breath, fatigue, increased somnolence (sleeping more), poor appetite

## 2020-01-08 ENCOUNTER — HOME CARE VISIT (OUTPATIENT)
Dept: SCHEDULING | Facility: HOME HEALTH | Age: 70
End: 2020-01-08
Payer: COMMERCIAL

## 2020-01-08 VITALS
HEART RATE: 78 BPM | SYSTOLIC BLOOD PRESSURE: 124 MMHG | DIASTOLIC BLOOD PRESSURE: 76 MMHG | TEMPERATURE: 98.2 F | OXYGEN SATURATION: 98 % | RESPIRATION RATE: 18 BRPM

## 2020-01-08 PROCEDURE — G0300 HHS/HOSPICE OF LPN EA 15 MIN: HCPCS

## 2020-01-09 RX ORDER — FUROSEMIDE 40 MG/1
60 TABLET ORAL DAILY
Qty: 135 TAB | Refills: 3 | Status: SHIPPED | OUTPATIENT
Start: 2020-01-09 | End: 2020-02-11

## 2020-01-09 RX ORDER — CARVEDILOL 6.25 MG/1
6.25 TABLET ORAL 2 TIMES DAILY
Qty: 180 TAB | Refills: 3 | Status: SHIPPED | OUTPATIENT
Start: 2020-01-09 | End: 2020-12-17

## 2020-01-10 ENCOUNTER — HOME CARE VISIT (OUTPATIENT)
Dept: SCHEDULING | Facility: HOME HEALTH | Age: 70
End: 2020-01-10
Payer: COMMERCIAL

## 2020-01-10 VITALS
OXYGEN SATURATION: 98 % | HEART RATE: 70 BPM | SYSTOLIC BLOOD PRESSURE: 120 MMHG | DIASTOLIC BLOOD PRESSURE: 80 MMHG | RESPIRATION RATE: 16 BRPM | TEMPERATURE: 98 F

## 2020-01-10 PROCEDURE — G0151 HHCP-SERV OF PT,EA 15 MIN: HCPCS

## 2020-01-12 VITALS
HEART RATE: 82 BPM | DIASTOLIC BLOOD PRESSURE: 78 MMHG | OXYGEN SATURATION: 98 % | SYSTOLIC BLOOD PRESSURE: 128 MMHG | RESPIRATION RATE: 17 BRPM | TEMPERATURE: 97.5 F

## 2020-01-14 ENCOUNTER — HOME CARE VISIT (OUTPATIENT)
Dept: HOME HEALTH SERVICES | Facility: HOME HEALTH | Age: 70
End: 2020-01-14
Payer: COMMERCIAL

## 2020-01-15 ENCOUNTER — OFFICE VISIT (OUTPATIENT)
Dept: CARDIOLOGY CLINIC | Age: 70
End: 2020-01-15

## 2020-01-15 DIAGNOSIS — I42.8 NONISCHEMIC CARDIOMYOPATHY (HCC): Primary | ICD-10-CM

## 2020-01-15 DIAGNOSIS — Z95.810 BIVENTRICULAR IMPLANTABLE CARDIOVERTER-DEFIBRILLATOR IN SITU: ICD-10-CM

## 2020-01-18 ENCOUNTER — HOME CARE VISIT (OUTPATIENT)
Dept: SCHEDULING | Facility: HOME HEALTH | Age: 70
End: 2020-01-18
Payer: COMMERCIAL

## 2020-01-18 PROCEDURE — G0299 HHS/HOSPICE OF RN EA 15 MIN: HCPCS

## 2020-01-19 VITALS
RESPIRATION RATE: 14 BRPM | HEART RATE: 78 BPM | TEMPERATURE: 97.8 F | DIASTOLIC BLOOD PRESSURE: 80 MMHG | OXYGEN SATURATION: 98 % | SYSTOLIC BLOOD PRESSURE: 140 MMHG

## 2020-01-20 ENCOUNTER — HOME CARE VISIT (OUTPATIENT)
Dept: SCHEDULING | Facility: HOME HEALTH | Age: 70
End: 2020-01-20
Payer: COMMERCIAL

## 2020-01-20 PROCEDURE — A6212 FOAM DRG <=16 SQ IN W/BORDER: HCPCS

## 2020-01-20 PROCEDURE — A6446 CONFORM BAND S W>=3" <5"/YD: HCPCS

## 2020-01-21 ENCOUNTER — HOME CARE VISIT (OUTPATIENT)
Dept: SCHEDULING | Facility: HOME HEALTH | Age: 70
End: 2020-01-21
Payer: COMMERCIAL

## 2020-01-21 PROCEDURE — G0300 HHS/HOSPICE OF LPN EA 15 MIN: HCPCS

## 2020-01-23 VITALS
SYSTOLIC BLOOD PRESSURE: 140 MMHG | TEMPERATURE: 98.3 F | DIASTOLIC BLOOD PRESSURE: 76 MMHG | HEART RATE: 84 BPM | RESPIRATION RATE: 18 BRPM | OXYGEN SATURATION: 97 %

## 2020-01-27 ENCOUNTER — HOME CARE VISIT (OUTPATIENT)
Dept: SCHEDULING | Facility: HOME HEALTH | Age: 70
End: 2020-01-27
Payer: COMMERCIAL

## 2020-01-27 PROCEDURE — G0300 HHS/HOSPICE OF LPN EA 15 MIN: HCPCS

## 2020-01-27 NOTE — PROGRESS NOTES
I have personally seen and evaluated the device findings. Interrogation reviewed and I agree with assessment.     Avis Obregon

## 2020-01-28 VITALS
OXYGEN SATURATION: 100 % | HEART RATE: 69 BPM | DIASTOLIC BLOOD PRESSURE: 78 MMHG | RESPIRATION RATE: 17 BRPM | TEMPERATURE: 97.6 F | SYSTOLIC BLOOD PRESSURE: 120 MMHG

## 2020-01-30 ENCOUNTER — HOME CARE VISIT (OUTPATIENT)
Dept: SCHEDULING | Facility: HOME HEALTH | Age: 70
End: 2020-01-30
Payer: COMMERCIAL

## 2020-01-30 PROCEDURE — G0299 HHS/HOSPICE OF RN EA 15 MIN: HCPCS

## 2020-02-01 VITALS
OXYGEN SATURATION: 98 % | DIASTOLIC BLOOD PRESSURE: 62 MMHG | TEMPERATURE: 98.5 F | HEART RATE: 74 BPM | RESPIRATION RATE: 16 BRPM | SYSTOLIC BLOOD PRESSURE: 120 MMHG

## 2020-02-01 PROCEDURE — A6197 ALGINATE DRSG >16 <=48 SQ IN: HCPCS

## 2020-02-01 PROCEDURE — A6212 FOAM DRG <=16 SQ IN W/BORDER: HCPCS

## 2020-02-03 ENCOUNTER — HOME CARE VISIT (OUTPATIENT)
Dept: SCHEDULING | Facility: HOME HEALTH | Age: 70
End: 2020-02-03
Payer: COMMERCIAL

## 2020-02-06 VITALS
HEART RATE: 76 BPM | DIASTOLIC BLOOD PRESSURE: 70 MMHG | TEMPERATURE: 97.5 F | OXYGEN SATURATION: 96 % | RESPIRATION RATE: 15 BRPM | SYSTOLIC BLOOD PRESSURE: 118 MMHG

## 2020-02-11 ENCOUNTER — HOSPITAL ENCOUNTER (EMERGENCY)
Age: 70
Discharge: HOME | End: 2020-02-12
Attending: EMERGENCY MEDICINE
Payer: COMMERCIAL

## 2020-02-11 ENCOUNTER — APPOINTMENT (OUTPATIENT)
Dept: GENERAL RADIOLOGY | Age: 70
End: 2020-02-11
Attending: EMERGENCY MEDICINE
Payer: COMMERCIAL

## 2020-02-11 VITALS
BODY MASS INDEX: 37.98 KG/M2 | HEIGHT: 67 IN | RESPIRATION RATE: 14 BRPM | DIASTOLIC BLOOD PRESSURE: 80 MMHG | HEART RATE: 80 BPM | SYSTOLIC BLOOD PRESSURE: 146 MMHG | WEIGHT: 242 LBS | TEMPERATURE: 98.1 F | OXYGEN SATURATION: 100 %

## 2020-02-11 DIAGNOSIS — E08.621 DIABETIC ULCER OF HEEL ASSOCIATED WITH DIABETES MELLITUS DUE TO UNDERLYING CONDITION, WITH FAT LAYER EXPOSED, UNSPECIFIED LATERALITY (HCC): Primary | ICD-10-CM

## 2020-02-11 DIAGNOSIS — L97.402 DIABETIC ULCER OF HEEL ASSOCIATED WITH DIABETES MELLITUS DUE TO UNDERLYING CONDITION, WITH FAT LAYER EXPOSED, UNSPECIFIED LATERALITY (HCC): Primary | ICD-10-CM

## 2020-02-11 LAB
ALBUMIN SERPL-MCNC: 3.5 G/DL (ref 3.4–5)
ALBUMIN/GLOB SERPL: 0.8 {RATIO} (ref 0.8–1.7)
ALP SERPL-CCNC: 106 U/L (ref 45–117)
ALT SERPL-CCNC: 26 U/L (ref 13–56)
ANION GAP SERPL CALC-SCNC: 2 MMOL/L (ref 3–18)
AST SERPL-CCNC: 26 U/L (ref 10–38)
BASOPHILS # BLD: 0 K/UL (ref 0–0.1)
BASOPHILS NFR BLD: 0 % (ref 0–2)
BILIRUB SERPL-MCNC: 0.7 MG/DL (ref 0.2–1)
BUN SERPL-MCNC: 36 MG/DL (ref 7–18)
BUN/CREAT SERPL: 24 (ref 12–20)
CALCIUM SERPL-MCNC: 9.3 MG/DL (ref 8.5–10.1)
CHLORIDE SERPL-SCNC: 108 MMOL/L (ref 100–111)
CO2 SERPL-SCNC: 31 MMOL/L (ref 21–32)
CREAT SERPL-MCNC: 1.48 MG/DL (ref 0.6–1.3)
CRP SERPL-MCNC: 1.6 MG/DL (ref 0–0.3)
DIFFERENTIAL METHOD BLD: ABNORMAL
EOSINOPHIL # BLD: 0.2 K/UL (ref 0–0.4)
EOSINOPHIL NFR BLD: 4 % (ref 0–5)
ERYTHROCYTE [DISTWIDTH] IN BLOOD BY AUTOMATED COUNT: 14.8 % (ref 11.6–14.5)
ERYTHROCYTE [SEDIMENTATION RATE] IN BLOOD: 38 MM/HR (ref 0–30)
GLOBULIN SER CALC-MCNC: 4.2 G/DL (ref 2–4)
GLUCOSE SERPL-MCNC: 142 MG/DL (ref 74–99)
HCT VFR BLD AUTO: 34.7 % (ref 35–45)
HGB BLD-MCNC: 12.2 G/DL (ref 12–16)
LACTATE SERPL-SCNC: 0.5 MMOL/L (ref 0.4–2)
LYMPHOCYTES # BLD: 1.7 K/UL (ref 0.9–3.6)
LYMPHOCYTES NFR BLD: 32 % (ref 21–52)
MCH RBC QN AUTO: 28.9 PG (ref 24–34)
MCHC RBC AUTO-ENTMCNC: 35.2 G/DL (ref 31–37)
MCV RBC AUTO: 82.2 FL (ref 74–97)
MONOCYTES # BLD: 0.3 K/UL (ref 0.05–1.2)
MONOCYTES NFR BLD: 5 % (ref 3–10)
NEUTS SEG # BLD: 3.2 K/UL (ref 1.8–8)
NEUTS SEG NFR BLD: 59 % (ref 40–73)
PLATELET # BLD AUTO: 179 K/UL (ref 135–420)
PMV BLD AUTO: 10.1 FL (ref 9.2–11.8)
POTASSIUM SERPL-SCNC: 3.9 MMOL/L (ref 3.5–5.5)
PROT SERPL-MCNC: 7.7 G/DL (ref 6.4–8.2)
RBC # BLD AUTO: 4.22 M/UL (ref 4.2–5.3)
SODIUM SERPL-SCNC: 141 MMOL/L (ref 136–145)
WBC # BLD AUTO: 5.4 K/UL (ref 4.6–13.2)

## 2020-02-11 PROCEDURE — 86140 C-REACTIVE PROTEIN: CPT

## 2020-02-11 PROCEDURE — 74011250637 HC RX REV CODE- 250/637: Performed by: EMERGENCY MEDICINE

## 2020-02-11 PROCEDURE — 80053 COMPREHEN METABOLIC PANEL: CPT

## 2020-02-11 PROCEDURE — 85652 RBC SED RATE AUTOMATED: CPT

## 2020-02-11 PROCEDURE — 73620 X-RAY EXAM OF FOOT: CPT

## 2020-02-11 PROCEDURE — 99282 EMERGENCY DEPT VISIT SF MDM: CPT

## 2020-02-11 PROCEDURE — 73630 X-RAY EXAM OF FOOT: CPT

## 2020-02-11 PROCEDURE — 83605 ASSAY OF LACTIC ACID: CPT

## 2020-02-11 PROCEDURE — 85025 COMPLETE CBC W/AUTO DIFF WBC: CPT

## 2020-02-11 RX ORDER — LEVOFLOXACIN 500 MG/1
500 TABLET, FILM COATED ORAL
Status: COMPLETED | OUTPATIENT
Start: 2020-02-11 | End: 2020-02-11

## 2020-02-11 RX ORDER — LEVOFLOXACIN 500 MG/1
500 TABLET, FILM COATED ORAL DAILY
Qty: 10 TAB | Refills: 0 | Status: SHIPPED | OUTPATIENT
Start: 2020-02-11 | End: 2020-02-21

## 2020-02-11 RX ADMIN — LEVOFLOXACIN 500 MG: 500 TABLET, FILM COATED ORAL at 19:03

## 2020-02-11 NOTE — ED PROVIDER NOTES
EMERGENCY DEPARTMENT HISTORY AND PHYSICAL EXAM      Date: 2/11/2020  Patient Name: Ramsey Garcia    History of Presenting Illness     Chief Complaint   Patient presents with    Foot Pain       History Provided By: Patient and Patient's     Chief Complaint: Painful and swollen foot    Additional History (Context): Ramsey Garcia is a 71 y.o. female who presents with ulcerative wound to the posterior aspect of her right heel. States that she has been seen and followed by her primary care provider for this, as well as her podiatrist.  Was referred to see her podiatrist today, Jacques James, who sent her to the emergency department for further evaluation. Patient states that the wound is slightly painful and has had a slightly malodorous discharge that has been worsening for the last couple days. Denies any fevers, chills, sweats. Denies any nausea, vomiting, diarrhea. PCP: Brennan Mayorga MD    Current Facility-Administered Medications   Medication Dose Route Frequency Provider Last Rate Last Dose    levoFLOXacin (LEVAQUIN) tablet 500 mg  500 mg Oral NOW Vida Boone MD         Current Outpatient Medications   Medication Sig Dispense Refill    levoFLOXacin (LEVAQUIN) 500 mg tablet Take 1 Tab by mouth daily for 10 days. 10 Tab 0    furosemide (LASIX) 40 mg tablet Take 60 mg by mouth daily.  carvedilol (COREG) 6.25 mg tablet Take 1 Tab by mouth two (2) times a day. 180 Tab 3    sacubitril-valsartan (ENTRESTO) 97 mg/103 mg tablet Take 1 Tab by mouth two (2) times a day. 180 Tab 3    insulin glargine (LANTUS U-100 INSULIN) 100 unit/mL injection 10 Units by SubCUTAneous route nightly. Indications: type 2 diabetes mellitus 10 Vial 0    exenatide microspheres (BYDUREON) 2 mg serr 2 mg by SubCUTAneous route every seven (7) days.  multivitamin (ONE A DAY) tablet Take 1 Tab by mouth daily.  metFORMIN ER (GLUCOPHAGE XR) 500 mg tablet Take 500 mg by mouth daily.       cholecalciferol, VITAMIN D3, (VITAMIN D3) 5,000 unit tab tablet Take 5,000 Units by mouth daily. take 1 tab daily of vitamin D3, 5000 units      gabapentin (NEURONTIN) 300 mg capsule Take 2 Caps by mouth two (2) times a day. Indications: NEUROPATHIC PAIN 100 Cap 0       Past History     Past Medical History:  Past Medical History:   Diagnosis Date    Acute paraplegia ([facility] [address] 4/20/2017    Benign hypertensive heart disease with systolic CHF, NYHA class 2 ([facility] [address] 9/5/2012    Biventricular implantable cardioverter-defibrillator in situ 04/28/2005    Upgraded to BiV AICD; gen change 4/2008; pocket revision 10/2009; Abdominal - done on 8/22/2012 by Dr. Jenna Abbott Cardiac cath 08/15/1996    Patent coronaries. Elev LVEDP. EF 50-55%.  Cardiac echocardiogram 06/23/2015    Ltd study. EF 45-50%. Mild, diffuse hypk. Severe apical hypk. No mass or thrombus was clearly identified, although imaging was suboptimal.      Cardiac nuclear imaging test 06/19/2015    Fixed distal apical, distal septal defect more likely due to RV pacing than prior infarct. No ischemia. EF 46%. RWMA c/w RV pacing. Nondiagnostic EKG on pharm stress test.      Cardiovascular lower extremity venous duplex 09/04/2012    Acute, non-occlusive DVT in CFV on right. No DVT on left. No superficial thrombosis bilaterally.  Cardiovascular upper extremity venous duplex 08/27/2012    DVT in axillary vein on left. Left subclavian was not visualized.     Chronic anemia 9/5/2012    Chronic systolic heart failure (HCC)     Decreased calculated glomerular filtration rate (GFR) 3/30/2017    Calculated GFR equivalent to that of CKD stage 3 = 30-59 ml/min    Diabetic neuropathy associated with type 2 diabetes mellitus ([facility] [address] 6/28/2011    Difficult airway for intubation 08/22/2012    see anesthesia airway note    Dyslipidemia 6/28/2011    Gout     History of complete heart block 6/28/2011    History of Coumadin therapy     Anticoagulation for DVT of the LUE; Discontinued on 3/30/2017    History of deep venous thrombosis 9/5/2012    Left upper extremity    History of pyelonephritis 3/30/2017    Left bundle branch block (LBBB) on electrocardiogram 6/28/2011    Nonischemic cardiomyopathy ([facility] 6/28/2011    Obesity (BMI 35.0-39.9 without comorbidity) 3/13/2017    Obstructive sleep apnea on CPAP 2/7/2012    Psoas abscess, right ([facility] [address] 4/20/2017    Psoas hematoma, right, secondary to anticoagulant therapy 3/30/2017    Type 2 diabetes mellitus with diabetic neuropathy ([facility] [address] 6/28/2011       Past Surgical History:  Past Surgical History:   Procedure Laterality Date    HX CARPAL TUNNEL RELEASE  4/07    right     HX CHOLECYSTECTOMY  1994    HX HYSTERECTOMY  1973    HX OTHER SURGICAL  6/11/2012    AICD revision    HX PACEMAKER  4/28/2005    [employer] AICD    NM REMVL PERM PM PLS GEN W/REPL PLSE GEN 2 LEAD SYS N/A 2/26/2019    REMOVE & REPLACE PPM GEN DUAL LEAD performed by Héctor Marcos MD at [facility]       Family History:  Family History   Problem Relation Age of Onset    Cancer Father         Leukemia       Social History:  Social History     Tobacco Use    Smoking status: Never Smoker    Smokeless tobacco: Never Used   Substance Use Topics    Alcohol use: Not Currently    Drug use: No       Allergies: Allergies   Allergen Reactions    Latex Itching    Vancomycin Itching    Ampicillin Itching    Bactrim [Sulfamethoxazole-Trimethoprim] Itching    Blueberry Swelling     Causes throat swelling    Ciprofloxacin Itching    Codeine Other (comments)     Jumpy feeling    Crestor [Rosuvastatin] Itching    Darvocet A500 [Propoxyphene N-Acetaminophen] Itching    Demerol [Meperidine] Itching    Levaquin [Levofloxacin] Itching    Lipitor [Atorvastatin] Myalgia    Magnesium Oxide Itching     nausea    Minocin [Minocycline] Itching    Pcn [Penicillins] Itching    Pravachol [Pravastatin] Swelling     Swelling in mouth.    Not allergic per patient, takes at home    Shellfish Derived Swelling     itching    Sulfa (Sulfonamide Antibiotics) Itching    Ultracet [Tramadol-Acetaminophen] Itching    Vicodin [Hydrocodone-Acetaminophen] Itching    Vytorin 10-10 [Ezetimibe-Simvastatin] Myalgia    Percodan [Oxycodone Hcl-Oxycodone-Asa] Itching         Review of Systems   Review of Systems   Constitutional: Negative for chills, fatigue and fever. HENT: Negative for congestion, rhinorrhea, sore throat and trouble swallowing. Eyes: Negative for discharge, redness and itching. Respiratory: Negative for cough, shortness of breath, wheezing and stridor. Cardiovascular: Negative for chest pain, palpitations and leg swelling. Gastrointestinal: Negative for abdominal pain, blood in stool, diarrhea, nausea and vomiting. Genitourinary: Negative for difficulty urinating and dysuria. Musculoskeletal: Negative for back pain. Skin: Positive for color change and wound. Negative for rash. Neurological: Negative for syncope and light-headedness. Psychiatric/Behavioral: Negative for behavioral problems and confusion. All other systems reviewed and are negative. Physical Exam     Vitals:    02/11/20 1615   BP: 146/80   Pulse: 80   Resp: 14   Temp: 98.1 °F (36.7 °C)   SpO2: 100%   Weight: 109.8 kg (242 lb)   Height: 5' 7\" (1.702 m)     Physical Exam  Vitals signs and nursing note reviewed. Constitutional:       General: She is not in acute distress. Appearance: She is well-developed. She is obese. She is not diaphoretic. Comments: Appears chronically ill, is in motorized wheelchair   HENT:      Head: Normocephalic and atraumatic. Mouth/Throat:      Mouth: Mucous membranes are moist.   Eyes:      Extraocular Movements: Extraocular movements intact. Conjunctiva/sclera: Conjunctivae normal.      Pupils: Pupils are equal, round, and reactive to light. Neck:      Musculoskeletal: Normal range of motion and neck supple.    Cardiovascular: Rate and Rhythm: Normal rate and regular rhythm. Heart sounds: Normal heart sounds. No murmur. No friction rub. No gallop. Pulmonary:      Effort: Pulmonary effort is normal.      Breath sounds: Normal breath sounds. No wheezing or rales. Abdominal:      General: Bowel sounds are normal. There is no distension. Palpations: Abdomen is soft. Tenderness: There is no abdominal tenderness. Musculoskeletal: Normal range of motion. Lymphadenopathy:      Cervical: No cervical adenopathy. Skin:     General: Skin is warm and dry. Comments: Right foot:  In soft air dressing. Bandage in place and removed for exam.  Has a superficial decubitus ulcer in the posterior heel ~2.5x2.5cm which has good granulation tissue. Has some minimal necrotic tissue around the edges of the wound. And some scant purulent drainage and foul odor. No fluctuance or signs of abscess. Foot is neurovascular intact distally. Does have mild to moderate dorsal foot edema. Neurological:      Mental Status: She is alert and oriented to person, place, and time. Diagnostic Study Results     Labs -     Recent Results (from the past 12 hour(s))   METABOLIC PANEL, COMPREHENSIVE    Collection Time: 02/11/20  4:22 PM   Result Value Ref Range    Sodium 141 136 - 145 mmol/L    Potassium 3.9 3.5 - 5.5 mmol/L    Chloride 108 100 - 111 mmol/L    CO2 31 21 - 32 mmol/L    Anion gap 2 (L) 3.0 - 18 mmol/L    Glucose 142 (H) 74 - 99 mg/dL    BUN 36 (H) 7.0 - 18 MG/DL    Creatinine 1.48 (H) 0.6 - 1.3 MG/DL    BUN/Creatinine ratio 24 (H) 12 - 20      GFR est AA 42 (L) >60 ml/min/1.73m2    GFR est non-AA 35 (L) >60 ml/min/1.73m2    Calcium 9.3 8.5 - 10.1 MG/DL    Bilirubin, total 0.7 0.2 - 1.0 MG/DL    ALT (SGPT) 26 13 - 56 U/L    AST (SGOT) 26 10 - 38 U/L    Alk.  phosphatase 106 45 - 117 U/L    Protein, total 7.7 6.4 - 8.2 g/dL    Albumin 3.5 3.4 - 5.0 g/dL    Globulin 4.2 (H) 2.0 - 4.0 g/dL    A-G Ratio 0.8 0.8 - 1.7     CBC WITH AUTOMATED DIFF    Collection Time: 02/11/20  4:22 PM   Result Value Ref Range    WBC 5.4 4.6 - 13.2 K/uL    RBC 4.22 4.20 - 5.30 M/uL    HGB 12.2 12.0 - 16.0 g/dL    HCT 34.7 (L) 35.0 - 45.0 %    MCV 82.2 74.0 - 97.0 FL    MCH 28.9 24.0 - 34.0 PG    MCHC 35.2 31.0 - 37.0 g/dL    RDW 14.8 (H) 11.6 - 14.5 %    PLATELET 511 118 - 608 K/uL    MPV 10.1 9.2 - 11.8 FL    NEUTROPHILS 59 40 - 73 %    LYMPHOCYTES 32 21 - 52 %    MONOCYTES 5 3 - 10 %    EOSINOPHILS 4 0 - 5 %    BASOPHILS 0 0 - 2 %    ABS. NEUTROPHILS 3.2 1.8 - 8.0 K/UL    ABS. LYMPHOCYTES 1.7 0.9 - 3.6 K/UL    ABS. MONOCYTES 0.3 0.05 - 1.2 K/UL    ABS. EOSINOPHILS 0.2 0.0 - 0.4 K/UL    ABS. BASOPHILS 0.0 0.0 - 0.1 K/UL    DF AUTOMATED     LACTIC ACID    Collection Time: 02/11/20  4:22 PM   Result Value Ref Range    Lactic acid 0.5 0.4 - 2.0 MMOL/L       Radiologic Studies -   XR FOOT RT AP/LAT   Final Result   IMPRESSION:      Marked dorsal soft tissue swelling of the forefoot. No definite radiographic finding for acute osteomyelitis or acute fracture. Generalized bone demineralization. Surgical staple in the posterior ankle soft tissues. CT Results  (Last 48 hours)    None        CXR Results  (Last 48 hours)    None            Medical Decision Making   I am the first provider for this patient. I reviewed the vital signs, available nursing notes, past medical history, past surgical history, family history and social history. Vital Signs-Reviewed the patient's vital signs. Records Reviewed: Old Medical Records    ED Course:   Remained stable and without additional complaint during her emergency department stay. Very interested in going home and not getting admitted. Disposition:  Discharge home    DISCHARGE NOTE:     Pt has been reexamined. Patient has no new complaints, changes, or physical findings. Care plan outlined and precautions discussed. Results of labs and x-ray were reviewed with the patient.  All medications were reviewed with the patient; will d/c home with Levaquin. All of pt's questions and concerns were addressed. Patient was instructed and agrees to follow up with home health and podiatry and primary care provider, as well as to return to the ED upon further deterioration. Patient is ready to go home. Follow-up Information     Follow up With Specialties Details Why Contact Info    Barbara German MD Internal Medicine Call in 1 day  [place] [address] [facility] EMERGENCY DEPT Emergency Medicine  As needed, If symptoms worsen [address] [address]  [phone]          Current Discharge Medication List      START taking these medications    Details   levoFLOXacin (LEVAQUIN) 500 mg tablet Take 1 Tab by mouth daily for 10 days. Qty: 10 Tab, Refills: 0    Comments: Has previously tolerated levaquin. Also note h/o ESBL. Associated Diagnoses: Diabetic ulcer of heel associated with diabetes mellitus due to underlying condition, with fat layer exposed, unspecified laterality ([facility] [address]         CONTINUE these medications which have NOT CHANGED    Details   furosemide (LASIX) 40 mg tablet Take 60 mg by mouth daily. carvedilol (COREG) 6.25 mg tablet Take 1 Tab by mouth two (2) times a day. Qty: 180 Tab, Refills: 3      sacubitril-valsartan (ENTRESTO) 97 mg/103 mg tablet Take 1 Tab by mouth two (2) times a day. Qty: 180 Tab, Refills: 3      insulin glargine (LANTUS U-100 INSULIN) 100 unit/mL injection 10 Units by SubCUTAneous route nightly. Indications: type 2 diabetes mellitus  Qty: 10 Vial, Refills: 0    Associated Diagnoses: Type 2 diabetes mellitus with diabetic neuropathy, with long-term current use of insulin (HCC)      exenatide microspheres (BYDUREON) 2 mg serr 2 mg by SubCUTAneous route every seven (7) days. multivitamin (ONE A DAY) tablet Take 1 Tab by mouth daily. metFORMIN ER (GLUCOPHAGE XR) 500 mg tablet Take 500 mg by mouth daily. cholecalciferol, VITAMIN D3, (VITAMIN D3) 5,000 unit tab tablet Take 5,000 Units by mouth daily. take 1 tab daily of vitamin D3, 5000 units      gabapentin (NEURONTIN) 300 mg capsule Take 2 Caps by mouth two (2) times a day. Indications: NEUROPATHIC PAIN  Qty: 100 Cap, Refills: 0           Consult:  Discussed care with Dr. Gerald Rolon, Specialty: podiatry. Standard discussion; including history of patients chief complaint, available diagnostic results, and treatment course. Reassuring labs. No indication for admission or acute surgical intervention at this point. Recommends trial of oral antibiotics as outpatient. He will call patient's home health and arrange for wound care at home. Recommend that I called Dr. Priyanka Siegel for oral antibiotic recommendations. 6:40 PM, 2/11/2020     Consult:  Discussed care with Dr. Mohsen Taylor, Specialty: Infectious disease. Standard discussion; including history of patients chief complaint, available diagnostic results, and treatment course. She is familiar with the patient, known ESBL. Although patient has a documented allergy to Levaquin with \"itching,\" states that patient has tolerated Levaquin in the past and recommends this is the best choice for oral outpatient antibiotics. 6:41 PM, 2/11/2020       Provider Notes (Medical Decision Making):   Patient with ESBL now with infected right heel decubitus ulcer. Appreciate consultation with patient's podiatrist, as well as patient's infectious disease provider. Recommendation is for trial of outpatient Levaquin, home health with local wound care, podiatry follow-up for consideration of more formal debridement, and strict return precautions. Diagnosis     Clinical Impression:   1.  Diabetic ulcer of heel associated with diabetes mellitus due to underlying condition, with fat layer exposed, unspecified laterality ([facility] [address]

## 2020-02-11 NOTE — DISCHARGE INSTRUCTIONS
Patient Education        Diabetes Foot Health: Care Instructions  Your Care Instructions    When you have diabetes, your feet need extra care and attention. Diabetes can damage the nerve endings and blood vessels in your feet, making you less likely to notice when your feet are injured. Diabetes also limits your body's ability to fight infection and get blood to areas that need it. If you get a minor foot injury, it could become an ulcer or a serious infection. With good foot care, you can prevent most of these problems. Caring for your feet can be quick and easy. Most of the care can be done when you are bathing or getting ready for bed. Follow-up care is a key part of your treatment and safety. Be sure to make and go to all appointments, and call your doctor if you are having problems. It's also a good idea to know your test results and keep a list of the medicines you take. How can you care for yourself at home? · Keep your blood sugar close to normal by watching what and how much you eat, monitoring blood sugar, taking medicines if prescribed, and getting regular exercise. · Do not smoke. Smoking affects blood flow and can make foot problems worse. If you need help quitting, talk to your doctor about stop-smoking programs and medicines. These can increase your chances of quitting for good. · Eat a diet that is low in fats. High fat intake can cause fat to build up in your blood vessels and decrease blood flow. · Inspect your feet daily for blisters, cuts, cracks, or sores. If you cannot see well, use a mirror or have someone help you. · Take care of your feet:  ? Wash your feet every day. Use warm (not hot) water. Check the water temperature with your wrists or other part of your body, not your feet. ? Dry your feet well. Pat them dry. Do not rub the skin on your feet too hard. Dry well between your toes.  If the skin on your feet stays moist, bacteria or a fungus can grow, which can lead to infection. ? Keep your skin soft. Use moisturizing skin cream to keep the skin on your feet soft and prevent calluses and cracks. But do not put the cream between your toes, and stop using any cream that causes a rash. ? Clean underneath your toenails carefully. Do not use a sharp object to clean underneath your toenails. Use the blunt end of a nail file or other rounded tool. ? Trim and file your toenails straight across to prevent ingrown toenails. Use a nail clipper, not scissors. Use an emery board to smooth the edges. · Change socks daily. Socks without seams are best, because seams often rub the feet. You can find socks for people with diabetes from specialty catalogs. · Look inside your shoes every day for things like gravel or torn linings, which could cause blisters or sores. · Buy shoes that fit well:  ? Look for shoes that have plenty of space around the toes. This helps prevent bunions and blisters. ? Try on shoes while wearing the kind of socks you will usually wear with the shoes. ? Avoid plastic shoes. They may rub your feet and cause blisters. Good shoes should be made of materials that are flexible and breathable, such as leather or cloth. ? Break in new shoes slowly by wearing them for no more than an hour a day for several days. Take extra time to check your feet for red areas, blisters, or other problems after you wear new shoes. · Do not go barefoot. Do not wear sandals, and do not wear shoes with very thin soles. Thin soles are easy to puncture. They also do not protect your feet from hot pavement or cold weather. · Have your doctor check your feet during each visit. If you have a foot problem, see your doctor. Do not try to treat an early foot problem at home. Home remedies or treatments that you can buy without a prescription (such as corn removers) can be harmful. · Always get early treatment for foot problems.  A minor irritation can lead to a major problem if not properly cared for early. When should you call for help? Call your doctor now or seek immediate medical care if:    · You have a foot sore, an ulcer or break in the skin that is not healing after 4 days, bleeding corns or calluses, or an ingrown toenail.     · You have blue or black areas, which can mean bruising or blood flow problems.     · You have peeling skin or tiny blisters between your toes or cracking or oozing of the skin.     · You have a fever for more than 24 hours and a foot sore.     · You have new numbness or tingling in your feet that does not go away after you move your feet or change positions.     · You have unexplained or unusual swelling of the foot or ankle.    Watch closely for changes in your health, and be sure to contact your doctor if:    · You cannot do proper foot care. Where can you learn more? Go to http://aren-mirella.info/. Enter A739 in the search box to learn more about \"Diabetes Foot Health: Care Instructions. \"  Current as of: April 16, 2019  Content Version: 12.2  © 2571-1395 [employer]. Care instructions adapted under license by [employer] (which disclaims liability or warranty for this information). If you have questions about a medical condition or this instruction, always ask your healthcare professional. [address] any warranty or liability for your use of this information.

## 2020-02-13 ENCOUNTER — HOME CARE VISIT (OUTPATIENT)
Dept: SCHEDULING | Facility: HOME HEALTH | Age: 70
End: 2020-02-13
Payer: COMMERCIAL

## 2020-02-13 PROCEDURE — G0299 HHS/HOSPICE OF RN EA 15 MIN: HCPCS

## 2020-02-18 ENCOUNTER — HOME CARE VISIT (OUTPATIENT)
Dept: SCHEDULING | Facility: HOME HEALTH | Age: 70
End: 2020-02-18
Payer: COMMERCIAL

## 2020-02-18 PROCEDURE — 400014 HH F/U

## 2020-02-18 PROCEDURE — G0300 HHS/HOSPICE OF LPN EA 15 MIN: HCPCS

## 2020-02-19 VITALS
HEART RATE: 74 BPM | TEMPERATURE: 97.6 F | SYSTOLIC BLOOD PRESSURE: 118 MMHG | OXYGEN SATURATION: 98 % | RESPIRATION RATE: 19 BRPM | DIASTOLIC BLOOD PRESSURE: 78 MMHG

## 2020-02-19 PROCEDURE — A6197 ALGINATE DRSG >16 <=48 SQ IN: HCPCS

## 2020-02-20 ENCOUNTER — HOME CARE VISIT (OUTPATIENT)
Dept: SCHEDULING | Facility: HOME HEALTH | Age: 70
End: 2020-02-20
Payer: COMMERCIAL

## 2020-02-20 PROCEDURE — G0300 HHS/HOSPICE OF LPN EA 15 MIN: HCPCS

## 2020-02-22 VITALS
SYSTOLIC BLOOD PRESSURE: 140 MMHG | OXYGEN SATURATION: 97 % | HEART RATE: 68 BPM | TEMPERATURE: 97.3 F | RESPIRATION RATE: 16 BRPM | DIASTOLIC BLOOD PRESSURE: 80 MMHG

## 2020-02-24 ENCOUNTER — HOME CARE VISIT (OUTPATIENT)
Dept: SCHEDULING | Facility: HOME HEALTH | Age: 70
End: 2020-02-24
Payer: COMMERCIAL

## 2020-02-24 PROCEDURE — G0300 HHS/HOSPICE OF LPN EA 15 MIN: HCPCS

## 2020-02-26 VITALS
OXYGEN SATURATION: 98 % | DIASTOLIC BLOOD PRESSURE: 66 MMHG | SYSTOLIC BLOOD PRESSURE: 118 MMHG | HEART RATE: 86 BPM | TEMPERATURE: 97.6 F | RESPIRATION RATE: 19 BRPM

## 2020-02-26 PROCEDURE — A6212 FOAM DRG <=16 SQ IN W/BORDER: HCPCS

## 2020-02-26 PROCEDURE — A6216 NON-STERILE GAUZE<=16 SQ IN: HCPCS

## 2020-02-26 PROCEDURE — A6445 CONFORM BAND S W <3"/YD: HCPCS

## 2020-02-28 ENCOUNTER — HOME CARE VISIT (OUTPATIENT)
Dept: SCHEDULING | Facility: HOME HEALTH | Age: 70
End: 2020-02-28
Payer: COMMERCIAL

## 2020-03-03 ENCOUNTER — HOME CARE VISIT (OUTPATIENT)
Dept: SCHEDULING | Facility: HOME HEALTH | Age: 70
End: 2020-03-03
Payer: COMMERCIAL

## 2020-03-03 PROCEDURE — A6446 CONFORM BAND S W>=3" <5"/YD: HCPCS

## 2020-03-05 ENCOUNTER — HOSPITAL ENCOUNTER (OUTPATIENT)
Dept: WOUND CARE | Age: 70
Discharge: HOME | End: 2020-03-05
Payer: COMMERCIAL

## 2020-03-05 VITALS
DIASTOLIC BLOOD PRESSURE: 78 MMHG | SYSTOLIC BLOOD PRESSURE: 136 MMHG | OXYGEN SATURATION: 99 % | TEMPERATURE: 99.1 F | HEART RATE: 74 BPM

## 2020-03-05 PROBLEM — Z99.3 WHEELCHAIR DEPENDENCE: Status: ACTIVE | Noted: 2020-03-05

## 2020-03-05 PROBLEM — L97.412: Status: ACTIVE | Noted: 2020-03-05

## 2020-03-05 PROCEDURE — 97597 DBRDMT OPN WND 1ST 20 CM/<: CPT

## 2020-03-05 PROCEDURE — 87205 SMEAR GRAM STAIN: CPT

## 2020-03-05 NOTE — DISCHARGE INSTRUCTIONS
Wound Care Instructions    Patient: Nela Vaughan MRN: [id number]  SSN: [id number]     YOB: 1950  Age:69 y.o. Sex: female       MsWill Juan Francisco Norris RN recommends the following discharge instruction:    Offloading    [x]   Felt/Foam Offloading- Prevelon          [x]  Wheelchair           Mattress:   Other:     Edema Control    []   Elevated legs as much as possible. Recommend above level of heart.     []   Layered Wraps             []   Left      []   Right      []  BILATERAL          - Type:            []   Unna Boot       []  Multi-Layer                                   []   Two Layers     []  Three Layers   []  Four Layers     []   Tubular Bandages        []   Left      []   Right     SIZE:      []   Stockings                      []   Left      []   Right     []   Compression Pump     []   Left      []   Right    ___ millimeters of mercury  ___ millimeters of mercury for ___ minutes for ___ day(s)        Other:       Nutritional Supplements    []  Multi-Vitamin     []  Other:       [employer]     [x]  Home Health: [employer]     []  Long Term Care:      []  DME:     Consult    []   Nutrition     []   Vascular     []   Orthotist/Pedorthotist     []   Infectious Disease     []   Lymphedema Therapist   Other:     Dressings:  Another brand of generically equivalent product may be dispensed unless specifically ordered otherwise.     Home Ulcer/wound Hygiene (cleanse with)         [x]   Normal Saline     [x]   Wound Cleanser              Apply        [x]   Collagen: Yulissa                               Other/Specific Instructions:    Cover With    []      [x]   Foam and Roll Gauze           [x]   Foam      [x]    Bordered                        Negrita-Ulcer Care    []     [x]   Lotion to feet and legs              Change Dressing    []   Once Daily     [x]   Every Other Day                            Follow-Up:   []  Follow up                              [x]  As Needed (PRN)     []   Sadaf Lewis MD    []  Clair Leon DPM    [x]  Katerine Quintana DPM   []   Khushi Cantor DPM      []   Nurse Visit            Please call the wound clinic ([phone]) regarding any questions or concerns.

## 2020-03-05 NOTE — PROGRESS NOTES
Podiatry Consultation Note    Assessment:       Patient Active Problem List   Diagnosis Code    Nonischemic cardiomyopathy ([facility] I42.8    History of complete heart block Z86.79    Biventricular implantable cardioverter-defibrillator in situ Z95.810    Left bundle branch block (LBBB) on electrocardiogram I44.7    Type 2 diabetes mellitus with diabetic neuropathy ([facility]) E11.40    Dyslipidemia E78.5    Diabetic neuropathy associated with type 2 diabetes mellitus ([facility] E11.40    Obstructive sleep apnea on CPAP G47.33, Z99.89    AICD generator infection ([facility] T82. 7XXA    Difficult airway for intubation T88. 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 1XXA    Impaired mobility and ADLs Z74.09    History of Coumadin therapy Z92.29    Gout M10.9    Acute paraplegia (HCC) G82.20    Sepsis ([facility]) A41.9    Psoas abscess, right ([facility] K68.12    Krueger catheter in place on admission Z96.0    Urinary tract infection due to Enterococcus N39.0, B95.2    Group B streptococcal infection A49.1    Hypervolemia E87.70    Anemia D64.9    Biventricular cardiac pacemaker in situ Z95.0    Neurogenic bladder N31.9    Type 2 diabetes with nephropathy ([facility]) E11.21    Obesity (BMI 30.0-34. 9) E66.9    Severe obesity (BMI 35.0-39. 9) with comorbidity ([facility] E66.01    Osteomyelitis ([facility] M86.9    Foot osteomyelitis, right ([facility] M86.9    Heel ulcer ([facility] L97.409    Infected wound T14. 8XXA, L08.9    Diabetic polyneuropathy associated with type 2 diabetes mellitus ([facility] E11.42    Non-healing wound of right heel S91.301A    Urinary retention R33.9    Dyspnea R06.00    Elevated troponin C82.45    Systolic CHF, acute on chronic (HCC) I50.23    Neuropathic ulcer of heel, right, with fat layer exposed ([address] L97.412    Wheelchair dependence Z99.3       Plan:     Selective excisional debridement right heel will start [address] with ijeoma, offloading foam, and mepilex. She is to continue with prevalon boots as previously instructed. After NS flush deep C&S + gram stain was performed, right posterior heel. Pending results, if needed, antibx therapy will be ordered. Will attempt to authorize Kerecis graft for application at the next visit. Subjective:     I was asked by Dr. Kath Longoria to evaluate Elaine Velázquez for chronic non healing right heel ulcer. Patient was seen in the ER and Rx Levaquin x 10 days. She  is a 71 y.o. female admitted on 3/5/2020 for Wound Care. Allergies   Allergen Reactions    Latex Itching    Vancomycin Itching    Ampicillin Itching    Bactrim [Sulfamethoxazole-Trimethoprim] Itching    Blueberry Swelling     Causes throat swelling    Ciprofloxacin Itching    Codeine Other (comments)     Jumpy feeling    Crestor [Rosuvastatin] Itching    Darvocet A500 [Propoxyphene N-Acetaminophen] Itching    Demerol [Meperidine] Itching    Levaquin [Levofloxacin] Itching    Lipitor [Atorvastatin] Myalgia    Magnesium Oxide Itching     nausea    Minocin [Minocycline] Itching    Pcn [Penicillins] Itching    Pravachol [Pravastatin] Swelling     Swelling in mouth.    Not allergic per patient, takes at home    Shellfish Derived Swelling     itching    Sulfa (Sulfonamide Antibiotics) Itching    Ultracet [Tramadol-Acetaminophen] Itching    Vicodin [Hydrocodone-Acetaminophen] Itching    Vytorin 10-10 [Ezetimibe-Simvastatin] Myalgia    Percodan [Oxycodone Hcl-Oxycodone-Asa] Itching       Current Outpatient Medications   Medication Sig    furosemide (LASIX) 40 mg tablet Take 60 mg by mouth daily.  carvedilol (COREG) 6.25 mg tablet Take 1 Tab by mouth two (2) times a day.  sacubitril-valsartan (ENTRESTO) 97 mg/103 mg tablet Take 1 Tab by mouth two (2) times a day.  insulin glargine (LANTUS U-100 INSULIN) 100 unit/mL injection 10 Units by SubCUTAneous route nightly. Indications: type 2 diabetes mellitus    exenatide microspheres (BYDUREON) 2 mg serr 2 mg by SubCUTAneous route every seven (7) days.  multivitamin (ONE A DAY) tablet Take 1 Tab by mouth daily.  metFORMIN ER (GLUCOPHAGE XR) 500 mg tablet Take 500 mg by mouth daily.  cholecalciferol, VITAMIN D3, (VITAMIN D3) 5,000 unit tab tablet Take 5,000 Units by mouth daily. take 1 tab daily of vitamin D3, 5000 units    gabapentin (NEURONTIN) 300 mg capsule Take 2 Caps by mouth two (2) times a day. Indications: NEUROPATHIC PAIN     No current facility-administered medications for this encounter. Past Medical History:   Diagnosis Date    Acute paraplegia ([facility] 4/20/2017    Benign hypertensive heart disease with systolic CHF, NYHA class 2 ([facility] 9/5/2012    Biventricular implantable cardioverter-defibrillator in situ 04/28/2005    Upgraded to BiV AICD; gen change 4/2008; pocket revision 10/2009; Abdominal - done on 8/22/2012 by Dr. Long Arroyo Cardiac cath 08/15/1996    Patent coronaries. Elev LVEDP. EF 50-55%.  Cardiac echocardiogram 06/23/2015    Ltd study. EF 45-50%. Mild, diffuse hypk. Severe apical hypk. No mass or thrombus was clearly identified, although imaging was suboptimal.      Cardiac nuclear imaging test 06/19/2015    Fixed distal apical, distal septal defect more likely due to RV pacing than prior infarct. No ischemia. EF 46%. RWMA c/w RV pacing. Nondiagnostic EKG on pharm stress test.      Cardiovascular lower extremity venous duplex 09/04/2012    Acute, non-occlusive DVT in CFV on right. No DVT on left. No superficial thrombosis bilaterally.     Cardiovascular upper extremity venous duplex 08/27/2012    DVT in axillary vein on left. Left subclavian was not visualized.     Chronic anemia 9/5/2012    Chronic systolic heart failure (HCC)     Decreased calculated glomerular filtration rate (GFR) 3/30/2017    Calculated GFR equivalent to that of CKD stage 3 = 30-59 ml/min    Diabetic neuropathy associated with type 2 diabetes mellitus ([address] 6/28/2011    Difficult airway for intubation 08/22/2012    see anesthesia airway note    Dyslipidemia 6/28/2011    Gout     History of complete heart block 6/28/2011    History of Coumadin therapy     Anticoagulation for DVT of the LUE; Discontinued on 3/30/2017    History of deep venous thrombosis 9/5/2012    Left upper extremity    History of pyelonephritis 3/30/2017    Left bundle branch block (LBBB) on electrocardiogram 6/28/2011    Nonischemic cardiomyopathy ([address] 6/28/2011    Obesity (BMI 35.0-39.9 without comorbidity) 3/13/2017    Obstructive sleep apnea on CPAP 2/7/2012    Psoas abscess, right ([address] 4/20/2017    Psoas hematoma, right, secondary to anticoagulant therapy 3/30/2017    Type 2 diabetes mellitus with diabetic neuropathy ([address] 6/28/2011     Past Surgical History:   Procedure Laterality Date    HX CARPAL TUNNEL RELEASE  4/07    right     HX CHOLECYSTECTOMY  1994    HX HYSTERECTOMY  1973    HX OTHER SURGICAL  6/11/2012    AICD revision    HX PACEMAKER  4/28/2005    [employer] AICD    LA REMVL PERM PM PLS GEN W/REPL PLSE GEN 2 LEAD SYS N/A 2/26/2019    REMOVE & REPLACE PPM GEN DUAL LEAD performed by Elyse Bullock MD at [facility]     Family History   Problem Relation Age of Onset    Cancer Father         Leukemia     Social History     Socioeconomic History    Marital status:      Spouse name: Not on file    Number of children: Not on file    Years of education: Not on file    Highest education level: Not on file   Occupational History    Not on file   Social Needs    Financial resource strain: Not on file    Food insecurity:     Worry: Not on file     Inability: Not on file    Transportation needs:     Medical: Not on file     Non-medical: Not on file   Tobacco Use    Smoking status: Never Smoker    Smokeless tobacco: Never Used   Substance and Sexual Activity    Alcohol use: Not Currently    Drug use: No    Sexual activity: Not Currently     Partners: Male   Lifestyle    Physical activity:     Days per week: Not on file     Minutes per session: Not on file    Stress: Not on file   Relationships    Social connections:     Talks on phone: Not on file     Gets together: Not on file     Attends [employer] service: Not on file     Active member of club or organization: Not on file     Attends meetings of clubs or organizations: Not on file     Relationship status: Not on file    Intimate partner violence:     Fear of current or ex partner: Not on file     Emotionally abused: Not on file     Physically abused: Not on file     Forced sexual activity: Not on file   Other Topics Concern    Not on file   Social History Narrative    Not on file       Physical Exam:  GENERAL: alert, cooperative, no distress, appears stated age    REVIEW OF SYSTEMS:  General: denies chronic fatigue, weight loss, fever, anemia, bruising, depression, nervousness, panic attacks  HEENT: denies ringing in ears, ear infections, dizzy spells, poor vision, glaucoma, sinus trouble, hoarseness, eye infections  GI: denies diarrhea, gas, bloating, heartburn, regurgitation, difficulty swallowing, painful swallowing, nausea, vomiting, constipation, abdominal pain, decreased appetite, blood in stools, black stools, jaundice, dark urine  Lungs: denies pneumonia, asthma, cough, SOB, hemoptysis  Heart: denies chest pain, irregular heart beat, ankle swelling, HTN  Skin: denies rashes, hives, allergic reaction  Urinary: denies UTI, kidney stones, decreased urine force and flow, urination at night, blood in urine, painful urination  Bones and Joints: denies arthritis, rheumatism, back pain, gout, osteoporosis  Neurologic: denies stroke, seizures, headaches, numbness, tingling      Vitals:    03/05/20 1216   BP: 136/78   Pulse: 74   Temp: 99.1 °F (37.3 °C)   SpO2: 99%       OBJECTIVE:    Patient is alert, well developed, cooperative, pleasant and in no apparent distress. Lower Extremity Exam:     VASCULAR EXAM:. Pedal pulses are palpable 1/4 DP 1/4 PT right foot. DP and PT heard with doppler PT biphasic and DP monophasic, right. Skin temperature is warm to warm right and left foot. Digital capillary fill time is 4 seconds right and left foot. There is edema of the right and left foot and ankle. NEUROLOGICAL EXAM:. Sensation Decrease with soft touch right and left foot. Deep tendon reflexes intact and symmetrical on the right and left foot. Babinski is age appropriate    MUSCULOSKELETAL EXAM:. Muscle tone is normal.  Muscle strength of the flexor and extensor group inversion and eversion Bilateral. 5/5. DERMATOLOGICAL EXAM:. Skin is of abnormal texture and turgor with atrophic skin changes noting hair growth, nail changes (thickening), Bilateral. There is evidence of healed fine-lined incision site posterior right ankle and heel. There is a ulcer full thickness with granulation tissue noted 12 o'clock to 12 o'clock with no undermining noted. There is evidence of slight maceration of the wound edges at 3 o'clock. There is noted minimal serous exudate noted without odor       No results found for this or any previous visit (from the past 24 hour(s)). Imaging  Right foot 2 views     HISTORY: Infection evaluation     COMPARISON: 12/27/2018     FINDINGS: Frontal and lateral imaging of the right foot. Generalized bone  demineralization. Mild arthritic change throughout the metatarsophalangeal and  in interphalangeal joint spaces. There is marked dorsal soft tissue swelling.   There is a skin staple posterior soft tissues of the ankle. The soft tissues of  the ankle and plantar foot are edematous. Fracture of the distal second  metatarsal head. There are no definite findings for periosteal reaction or  erosion.     IMPRESSION  IMPRESSION:     Marked dorsal soft tissue swelling of the forefoot.     No definite radiographic finding for acute osteomyelitis or acute fracture.     Generalized bone demineralization.     Surgical staple in the posterior ankle soft tissues. Imaging     XR FOOT RT AP/LAT (Order: [id number]) - 2/11/2020   :         EXCISIONAL DEBRIDEMENT NOTE    Selective sharp instrument debridement of slough and devitilized tissue    After the benefits/risks/SE were discussed, the patient agreed to proceed. Time out was done:   * Patient was identified by name and date of birth   * Agreement on procedure being performed was verified   * Procedure site verified and marked as necessary   * Patient was positioned for comfort   * Consent was signed and verified. Site: posterior right heel    Instruments used:    [x]  Dermal curette  [] Blade        [] #15  [] #10  [] Forceps  [] Tissue nippers  [] sterile scissors  [] Other     Anesthesia:    []  EMLA 2.5% cream: applied to wound beds for approximately 15minutes. []   Lidocaine 2% Topical Gel      []  Lidocaine injectable 1% with epinephrine 1:100,000    []  Lidocaine injectable 1% without epinephrine    []  Other:     [x]  None         [x] patient is insensate due to neuropathy         [] patient declines        [] allergy to anesthetic        [] tissue for debridement is either superficial, loosely adherent and/or necrotic and denervated     After satisfactory anesthesia achieved, the wound/s was/were sharply excisional debrided necrotic, devitalized and granulation tissue down to the sub Q layer, revealing a clean and viable wound bed.     Post debridement measurement was 5.0_x__5.0_x_0.1_cm, posterior right heel        Bleeding: <5mL Resolved with light focal pressure. Wounds were cleaned and irrigated with saline. Wound care applied:   []   Hydrogell   []  Hypergel   []   Hydrofiber/Aquacel      []   Cadexomer Iodine (Iodosorb)   []  Silver Alginate    []   Medihoney:    []   Collagenase:Santyl   []  Calcium Alginate   [x]   Collagen:Yulissa    [x]   Foam offloading   []  Non-Adherent Contact Layer   []   Xeroform    []   Adaptec:   []   Hydrocolloid   []   Transparent Film    []   Promogran   []   Yulissa   []   Promogran       []  Antibiotic ointment/cream  []   Wound VAC   []   Other (see below)     Other:     []   Dry Gauze and Roll Gauze    []   Foam and Roll Gauze    []   Dry Gauze    []    Bordered gauze:     []   Secure with Tape    [x]   Other  mepilex    []   Compression Wrap:          []   Unna Boot    []Multi-Layer    []Tubular Bandages       Negrita-Ulcer Care    []   Cream     []   Lotion     []   Ointment     []   Barrier     []   Other:       The patient tolerated the procedure well with no complications. The patient left the exam room in satisfactory and stable condition.        Mela Toure DPM  [facility]  [id number]  3/5/2020, 1:57 PM

## 2020-03-06 PROCEDURE — A6021 COLLAGEN DRESSING <=16 SQ IN: HCPCS

## 2020-03-06 PROCEDURE — A6212 FOAM DRG <=16 SQ IN W/BORDER: HCPCS

## 2020-03-07 ENCOUNTER — HOME CARE VISIT (OUTPATIENT)
Dept: SCHEDULING | Facility: HOME HEALTH | Age: 70
End: 2020-03-07
Payer: COMMERCIAL

## 2020-03-07 PROCEDURE — G0299 HHS/HOSPICE OF RN EA 15 MIN: HCPCS

## 2020-03-08 VITALS
HEART RATE: 84 BPM | OXYGEN SATURATION: 100 % | RESPIRATION RATE: 18 BRPM | TEMPERATURE: 98.6 F | SYSTOLIC BLOOD PRESSURE: 116 MMHG | DIASTOLIC BLOOD PRESSURE: 79 MMHG

## 2020-03-08 LAB
BACTERIA SPEC CULT: NORMAL
GRAM STN SPEC: NORMAL
GRAM STN SPEC: NORMAL
SERVICE CMNT-IMP: NORMAL

## 2020-03-09 ENCOUNTER — HOME CARE VISIT (OUTPATIENT)
Dept: SCHEDULING | Facility: HOME HEALTH | Age: 70
End: 2020-03-09
Payer: COMMERCIAL

## 2020-03-09 PROCEDURE — G0300 HHS/HOSPICE OF LPN EA 15 MIN: HCPCS

## 2020-03-11 ENCOUNTER — HOSPITAL ENCOUNTER (OUTPATIENT)
Dept: CT IMAGING | Age: 70
Discharge: HOME | End: 2020-03-11
Attending: UROLOGY
Payer: COMMERCIAL

## 2020-03-11 ENCOUNTER — HOME CARE VISIT (OUTPATIENT)
Dept: HOME HEALTH SERVICES | Facility: HOME HEALTH | Age: 70
End: 2020-03-11
Payer: COMMERCIAL

## 2020-03-11 DIAGNOSIS — R10.9 ABDOMINAL PAIN, UNSPECIFIED ABDOMINAL LOCATION: ICD-10-CM

## 2020-03-11 DIAGNOSIS — Z43.5 ENCOUNTER FOR CARE OR REPLACEMENT OF SUPRAPUBIC TUBE (HCC): ICD-10-CM

## 2020-03-11 PROCEDURE — 74176 CT ABD & PELVIS W/O CONTRAST: CPT

## 2020-03-11 PROCEDURE — G0300 HHS/HOSPICE OF LPN EA 15 MIN: HCPCS

## 2020-03-12 NOTE — PROGRESS NOTES
Let her know CT shows no issues with her tube. Her symptoms may be related to severe constipation from neurogenic bowel. Recommend she see GI consult for severe constipation.

## 2020-03-13 ENCOUNTER — HOME CARE VISIT (OUTPATIENT)
Dept: SCHEDULING | Facility: HOME HEALTH | Age: 70
End: 2020-03-13
Payer: COMMERCIAL

## 2020-03-13 PROCEDURE — G0300 HHS/HOSPICE OF LPN EA 15 MIN: HCPCS

## 2020-03-13 PROCEDURE — A6260 WOUND CLEANSER ANY TYPE/SIZE: HCPCS

## 2020-03-13 PROCEDURE — A6021 COLLAGEN DRESSING <=16 SQ IN: HCPCS

## 2020-03-13 PROCEDURE — A6446 CONFORM BAND S W>=3" <5"/YD: HCPCS

## 2020-03-16 ENCOUNTER — HOME CARE VISIT (OUTPATIENT)
Dept: SCHEDULING | Facility: HOME HEALTH | Age: 70
End: 2020-03-16
Payer: COMMERCIAL

## 2020-03-16 PROCEDURE — G0300 HHS/HOSPICE OF LPN EA 15 MIN: HCPCS

## 2020-03-17 VITALS
HEART RATE: 78 BPM | RESPIRATION RATE: 17 BRPM | OXYGEN SATURATION: 98 % | HEART RATE: 70 BPM | TEMPERATURE: 97.1 F | HEART RATE: 70 BPM | DIASTOLIC BLOOD PRESSURE: 80 MMHG | TEMPERATURE: 97.7 F | RESPIRATION RATE: 15 BRPM | RESPIRATION RATE: 18 BRPM | OXYGEN SATURATION: 99 % | SYSTOLIC BLOOD PRESSURE: 122 MMHG | DIASTOLIC BLOOD PRESSURE: 64 MMHG | DIASTOLIC BLOOD PRESSURE: 68 MMHG | SYSTOLIC BLOOD PRESSURE: 128 MMHG | SYSTOLIC BLOOD PRESSURE: 118 MMHG | OXYGEN SATURATION: 98 % | TEMPERATURE: 97.5 F

## 2020-03-18 ENCOUNTER — HOME CARE VISIT (OUTPATIENT)
Dept: SCHEDULING | Facility: HOME HEALTH | Age: 70
End: 2020-03-18
Payer: COMMERCIAL

## 2020-03-19 ENCOUNTER — HOSPITAL ENCOUNTER (OUTPATIENT)
Dept: WOUND CARE | Age: 70
Discharge: HOME | End: 2020-03-19
Payer: COMMERCIAL

## 2020-03-19 VITALS
OXYGEN SATURATION: 98 % | TEMPERATURE: 97.2 F | SYSTOLIC BLOOD PRESSURE: 137 MMHG | DIASTOLIC BLOOD PRESSURE: 80 MMHG | HEART RATE: 76 BPM

## 2020-03-19 PROCEDURE — 15275 SKIN SUB GRAFT FACE/NK/HF/G: CPT

## 2020-03-19 NOTE — DISCHARGE INSTRUCTIONS
Discharge Instructions for  [address]  [address]  Telephone: [phone]    NAME:  Edinson Bryant OF BIRTH:  1950  MEDICAL RECORD NUMBER:  [id number]  EPISODE DATE:  3/19/2020        Ms. Verito Reid, Dr. Ladarius Vidal recommends the following discharge instruction:    Offloading    [x]   Felt/Foam Offloading                Mattress:   Other:         Nutritional Supplements    [x]  Multi-Vitamin       [employer]     [x]  Home Health: [employer]               Other/Specific Instructions: Please hold wound care for this patient for one week. She has had a graft placed today and  will be seen in the wound clinic next week. New dressing orders will be sent next week. Thank You! Follow-Up: 3/23/20 and 3/26/20                   [x]   Nurse Visit               Electronically signed Jodie Harris RN on 3/19/2020 at 3:53 PM     [address] [facility] Information: Should you experience any significant changes in your wound(s) or have questions about your wound care, please contact the [facility] [place] at  [phone]) Shad 12 8:00 am - 4:30. If you need help with your wound outside these hours and cannot wait until we are again available, contact your PCP or go to the hospital emergency room. PLEASE NOTE: IF YOU ARE UNABLE TO OBTAIN WOUND SUPPLIES, CONTINUE TO USE THE SUPPLIES YOU HAVE AVAILABLE UNTIL YOU ARE ABLE TO REACH US. IT IS MOST IMPORTANT TO KEEP THE WOUND COVERED AT ALL TIMES.      Physician Signature:_______________________    Date: ___________ Time:  ____________

## 2020-03-19 NOTE — WOUND CARE
Wound/Ostomy Nurse Progress Note Patient: Shmuel Denny VYS:[birth year] MRN: [id number] Situation: Wound care follow up with Dr. Sarabjit Miranda. Background: Stage 4 pressure injury right heel. Assessment: see Flow Sheet Recommendation: Follow up on 3/23 and 3/26 for nurse visit. Follow up after that with Dr. Sarabjit Miranda. 03/19/20 1113 Wound Heel Right stage 4- healing Date First Assessed/Time First Assessed: 03/05/20 1230   Primary Wound Type: Pressure Injury  Location: Heel  Wound Location Orientation: Right  Wound Description: stage 4- healing Dressing Status Removed Dressing Type Foam  
Pressure Injury Stage 4 Wound Length (cm) 5 cm Wound Width (cm) 3.2 cm Wound Depth (cm) 0.2 cm Wound Surface Area (cm^2) 16 cm^2 Wound Volume (cm^3) 3.2 cm^3 Change in Wound Size % 34.69 Condition of Base Granulation Assessment Drainage;Red Tissue Type Percent Pink 20 Tissue Type Percent Red 80 Drainage Amount Moderate Drainage Color Cloudy;Brown Wound Odor None Negrita-wound Assessment Maceration Cleansing and Cleansing Agents  Dermal wound cleanser Dressing Changed Changed/New Dressing Type Applied Other (Comment) 
(Kerecis graft, staples, Cutimed, foam) Wound Procedure Type Skin Substitue Procedure Time Out 1106 Consent Obtained  Yes Procedure Bleeding None Skin Sub Application  7/08 Reconstituted with NS  Yes Skin Sub Lot #  [id number] Expiration Date  01/05/22 Affixed with (Staples) Post Procedure Pain Scale Numeric 0/10 Assisted Physician in Procedure  Yes

## 2020-03-19 NOTE — PROGRESS NOTES
Kerecis Omega3 and Debridement Procedure Note    Pre-Operative Diagnosis: Non-Healing Wound  Post-Operative Diagnosis: Same    Site: posterior right heel     Procedure:   1. Selective sharp instrument debridement of slough and devitilized tissue  2. Application of Kerecis Omega3       Indications: 1. Removal of devitalized and necrotic tissue to promote healing and evaluate the extent of healing. 2. Stage 1 of a planned staged series of  10                        applications ofKerecis Omega3 in wound not responding to conventional therapy     After the benefits/risks/SE were discussed, the patient agreed to proceed. Time out was done:   * Patient was identified by name and date of birth   * Agreement on procedure being performed was verified   * Procedure site verified and marked as necessary   * Patient was positioned for comfort   * Consent was signed and verified. Instruments used:    [x]  Dermal curette  Blade        [] #15  [] #10  [] Forceps  [] Tissue nippers  [] sterile scissors  [] Other     Anesthesia used:    []  EMLA 2.5% cream: applied to wound beds for approximately 15minutes. []   Lidocaine 2% Topical Gel      []  Lidocaine injectable 1% with epinephrine 1:100,000; Amount used: mL    []  Lidocaine injectable 1% without epinephrine; Amount used: mL    []  Other:     [x]  None         [x] patient is insensate due to neuropathy         [] patient declines        [] allergy to anesthetic        [] tissue for debridement is either superficial, loosely adherent and/or necrotic and denervated     Description of Procedure: After usual preparation, sharp debridement of slough and devitalized tissue was performed utilizing the instruments as above. Tissue was removed from the sub Q layer. The wound was debrided to remove non-viable tissue and to clearly reveal the wound margins. The wound was debrided to an acute state with some bleeding from the capillary bed.      Pre-debridement measurement: see accompanying progress note  Post debridement measurement:  5.0__x_3.4__x0.2_cm . Bleeding: <5mL Resolved with light focal pressure. Wounds were cleaned and irrigated with saline. After usual preparation,Kerecis Omega3 applied to the wound and affixed to the skin with steri strips and covered with non-adherent contact layer. The patient tolerated the procedure without complication. Kerecis Omega3 21 sq cm sheet  Kerecis Omega3 used: 21 sq cm  Kerecis Omega3 discarded: 0 sq cm    Wound care applied:   []   Hydrogell   []  Hypergel   []   Hydrofiber/Aquacel      []   Cadexomer Iodine (Iodosorb)   []  Silver Alginate    []   Medihoney:    []   Collagenase:Santyl   []  Calcium Alginate   []   Collagen:    []   Foam   []  Non-Adherent Contact Layer   []   Xeroform    []   Adaptec:   []   Hydrocolloid   []   Transparent Film    []  Antibiotic ointment/cream    []  hyderofera blue   [x]   Other (see below)    [] Mesalt       Other:cutimed sorbact with slain moistened gauze     []   Dry Gauze and Roll Gauze    []   Foam and Roll Gauze    []   Dry Gauze    []    Bordered gauze:     []   Secure with Tape    [x]   Other mepilex     []   Compression Wrap:          []   Unna Boot    []Multi-Layer    []Tubular Bandages       Negrita-Ulcer Care    []   Cream     []   Lotion     []   Ointment     []   Barrier     []   Other:       The patient tolerated the procedure well with no complications. The patient left the exam room in satisfactory and stable condition.      Eda Paige DPM

## 2020-03-20 ENCOUNTER — HOME CARE VISIT (OUTPATIENT)
Dept: HOME HEALTH SERVICES | Facility: HOME HEALTH | Age: 70
End: 2020-03-20
Payer: COMMERCIAL

## 2020-03-23 ENCOUNTER — HOSPITAL ENCOUNTER (OUTPATIENT)
Dept: WOUND CARE | Age: 70
Discharge: HOME | End: 2020-03-23
Payer: COMMERCIAL

## 2020-03-23 PROCEDURE — 99213 OFFICE O/P EST LOW 20 MIN: CPT

## 2020-03-23 NOTE — WOUND CARE
Wound/Ostomy Nurse Progress Note Patient: Kali Marquez FUL:9/66/1941 MRN: [id number] Situation: Nurse visit for graft check and dressing change. Background: Non-healing ulcer of the right heel; stage 4 pressure injury. Assessment: The Kerecis graft is 70%-80% dissolved. There is a large amount of brown drainage on the dressing. Her wound is not visible as there is still some graft integrating into the wound bed. Patient denies pain. There are no s/s of infection. A new dressing of collagen, Cutimed, and bordered foam was applied. Recommendation: Follow up on 3/26/20 for next dressing change.

## 2020-03-26 ENCOUNTER — HOSPITAL ENCOUNTER (OUTPATIENT)
Dept: WOUND CARE | Age: 70
Discharge: HOME | End: 2020-03-26
Payer: COMMERCIAL

## 2020-03-26 VITALS
OXYGEN SATURATION: 99 % | SYSTOLIC BLOOD PRESSURE: 128 MMHG | DIASTOLIC BLOOD PRESSURE: 84 MMHG | TEMPERATURE: 96.9 F | RESPIRATION RATE: 17 BRPM | HEART RATE: 74 BPM

## 2020-03-26 PROCEDURE — 99212 OFFICE O/P EST SF 10 MIN: CPT

## 2020-03-26 NOTE — DISCHARGE INSTRUCTIONS
Discharge Instructions for  [facility]  [address] [place] [address]  Telephone: [phone]    NAME:  Marycarmen Sevilla OF BIRTH:  1950  MEDICAL RECORD NUMBER:  [id number]  EPISODE DATE:  3/26/2020        Ms. Sima Pickens, Dr Shawna Hernandez recommends the following discharge instruction:    Offloading    [x]   Felt/Foam Offloading Boot   [] Removable Cast Louise Holstein       []   Wedge Shoe   [x]  Wheelchair     []   Total Contact Cast   []  Surgical Shoe     []   Crutches        Other   Mattress:   Other:     Edema Control    []   Elevated legs as much as possible. Recommend above level of heart.     []   Layered Wraps    []   Left      []   Right      []  BILATERAL    []   Unna Boot            []  Other compression wrap                                         []   2-Layer      []  3-Layer  []  4-Layer     []   Tubular Bandages        []   Left      []   Right     SIZE:      []   Stockings                      []   Left      []   Right     []   Compression Pump     []   Left      []   Right       Other:       Nutritional Supplements    []  Multi-Vitamin       []  Other:       [employer]     [x]  Home Health:  [facility]     []  Long Term Care:      []  DME:     Consult    []   Nutrition     []   Vascular     []   Orthotist/Pedorthotist     []   Infectious Disease     []   Lymphedema Therapist   Other:     Dressings:  Home Ulcer/wound Hygiene (cleanse with)      []   Distilled Water     []   Normal Saline     [x]   Wound Cleanser     []   Mild antimicrobial soap and water     []   Chlorhexidine liquid soap and water     []   Other:     Apply    []   Hydrogel   []  Hypergel   []   Aquacel Ag     []   Cadexomer            Iodine (Iodosorb)   []  Silver Alginate    []   Medihoney:     []   Collagenase:Santyl   []  Calcium Alginate   [x]   Collagen: Yulissa     []   Plain Foam   []  Non-Adherent          Contact Layer   []   Xeroform     []   Silver foam   [] Hydrocolloid        []   Acticoat   []   Adaptic: Cutimed      Other/Specific Instructions: Cleanse wound with Dermal Wound Cleanser. Apply Yulissa to wound bed and cover with Cutimed dressing. Apply bordered foam dressing and secure with tape. Offload Heel with Foam boot at all times. Change dressings every other day beginning Saturday 3/28/20. Pt will follow up with Dr Burkett Miles 4/9/2020 at 10:30 at her [place] office. Cover With    []   Dry Gauze and Roll Gauze     []   Foam and Roll Gauze     []   Dry Gauze     []   Bordered gauze:     []   Foam      [x]    Bordered         []   Nonbordered     []   Secure with Tape     []   Other       Negrita-Ulcer Care    []   Cream     []   Lotion     []   Ointment     []   Barrier     []   Other:     Change Dressing    []   Once Daily     [x]   Every Other Day     []   Every 3 Days       []   Every 5 Days     []   Every 7 Days     []   Do Not Change       []   2 x per week (Mon and Thurs. )     []   3 x per week (Mon, Wed, Fri. )     []   Other          Follow-Up:   [x]  Appointment on: 4/9/2020                             []  As Needed (PRN)     []   Anahi Webb MD    []  Christiana Ceorn DPM    [x]  Bardley Olea DPM   []   Roberta Hancock DPM      []   Nurse Visit               Electronically signed Louie Browning RN, CWON on 3/26/2020 at 10:18 AM     [address] Information: Should you experience any significant changes in your wound(s) or have questions about your wound care, please contact the [address] [place] [address] at  [phone]) [address] 8:00 am - 4:30. If you need help with your wound outside these hours and cannot wait until we are again available, contact your PCP or go to the hospital emergency room. PLEASE NOTE: IF YOU ARE UNABLE TO OBTAIN WOUND SUPPLIES, CONTINUE TO USE THE SUPPLIES YOU HAVE AVAILABLE UNTIL YOU ARE ABLE TO REACH US. IT IS MOST IMPORTANT TO KEEP THE WOUND COVERED AT ALL TIMES.      Physician Signature:_______________________    Date: ___________ Time:  ____________

## 2020-03-26 NOTE — WOUND CARE
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 [facility] to perform dressings changes every other day starting Saturday 3/25/2020.  
  
 
 03/26/20 1008 Wound Heel Right stage 4- healing Date First Assessed/Time First Assessed: 03/05/20 1230   Primary Wound Type: Pressure Injury  Location: Heel  Wound Location Orientation: Right  Wound Description: stage 4- healing Dressing Status Removed Dressing Type Other (Comment) 
(cutimed, foam ) Pressure Injury Stage 4 Wound Length (cm) 4.8 cm Wound Width (cm) 6 cm Wound Depth (cm) 0.3 cm Wound Surface Area (cm^2) 28.8 cm^2 Wound Volume (cm^3) 8.64 cm^3 Change in Wound Size % -17.55 Tissue Type Percent Maroon/Purple 10 % Tissue Type Percent Pink 80 Tissue Type Percent Red 10 Drainage Amount Moderate Drainage Color Clear;Sanguinous Wound Odor None Negrita-wound Assessment Maceration Cleansing and Cleansing Agents  Dermal wound cleanser Dressing Changed Changed/New Dressing Type Applied Other (Comment) (Yulissa, cutimed, bordered foam)

## 2020-03-30 ENCOUNTER — HOME CARE VISIT (OUTPATIENT)
Dept: SCHEDULING | Facility: HOME HEALTH | Age: 70
End: 2020-03-30
Payer: COMMERCIAL

## 2020-03-30 PROCEDURE — 400014 HH F/U

## 2020-03-30 PROCEDURE — G0300 HHS/HOSPICE OF LPN EA 15 MIN: HCPCS

## 2020-03-31 VITALS
HEART RATE: 70 BPM | DIASTOLIC BLOOD PRESSURE: 74 MMHG | OXYGEN SATURATION: 98 % | RESPIRATION RATE: 16 BRPM | SYSTOLIC BLOOD PRESSURE: 130 MMHG | TEMPERATURE: 97.6 F

## 2020-04-01 ENCOUNTER — HOME CARE VISIT (OUTPATIENT)
Dept: SCHEDULING | Facility: HOME HEALTH | Age: 70
End: 2020-04-01
Payer: COMMERCIAL

## 2020-04-01 PROCEDURE — G0300 HHS/HOSPICE OF LPN EA 15 MIN: HCPCS

## 2020-04-01 PROCEDURE — A6212 FOAM DRG <=16 SQ IN W/BORDER: HCPCS

## 2020-04-01 PROCEDURE — A4452 WATERPROOF TAPE: HCPCS

## 2020-04-01 PROCEDURE — A4927 NON-STERILE GLOVES: HCPCS

## 2020-04-03 ENCOUNTER — HOME CARE VISIT (OUTPATIENT)
Dept: HOME HEALTH SERVICES | Facility: HOME HEALTH | Age: 70
End: 2020-04-03
Payer: COMMERCIAL

## 2020-04-06 ENCOUNTER — HOME CARE VISIT (OUTPATIENT)
Dept: SCHEDULING | Facility: HOME HEALTH | Age: 70
End: 2020-04-06
Payer: COMMERCIAL

## 2020-04-06 VITALS
DIASTOLIC BLOOD PRESSURE: 74 MMHG | OXYGEN SATURATION: 99 % | SYSTOLIC BLOOD PRESSURE: 126 MMHG | TEMPERATURE: 97.7 F | HEART RATE: 94 BPM | RESPIRATION RATE: 18 BRPM

## 2020-04-06 PROCEDURE — G0300 HHS/HOSPICE OF LPN EA 15 MIN: HCPCS

## 2020-04-07 DIAGNOSIS — E78.5 DYSLIPIDEMIA: Primary | ICD-10-CM

## 2020-04-07 RX ORDER — ALIROCUMAB 75 MG/ML
75 INJECTION, SOLUTION SUBCUTANEOUS EVERY 2 WEEKS
Qty: 2 SYRINGE | Refills: 3 | Status: SHIPPED | OUTPATIENT
Start: 2020-04-07 | End: 2020-10-20

## 2020-04-07 NOTE — TELEPHONE ENCOUNTER
Received request for prior authorization for Repatha 140 mg/mL from [facility] [address]. Prior Auth requested through Cover My Meds and was denied due to: \"Coverage is provided in situations where the patient has tried the preferred formulary alternative, Praluent. Coverage cannot be authorized at this time. Request for Praluent Rx sent to Dr Henry Hyde for dosing and approval. 
 
Lipid Panel results on 02/05/2020 scanned from Dr Nazanin Pate: 
Chol, Tot     255 (H) Trig              137 HDL Chol       55 VLDL              27 
LDL Chol      173 (H)

## 2020-04-08 ENCOUNTER — HOME CARE VISIT (OUTPATIENT)
Dept: SCHEDULING | Facility: HOME HEALTH | Age: 70
End: 2020-04-08
Payer: COMMERCIAL

## 2020-04-08 PROCEDURE — G0300 HHS/HOSPICE OF LPN EA 15 MIN: HCPCS

## 2020-04-09 VITALS
OXYGEN SATURATION: 98 % | DIASTOLIC BLOOD PRESSURE: 70 MMHG | TEMPERATURE: 98 F | TEMPERATURE: 97.8 F | OXYGEN SATURATION: 98 % | HEART RATE: 78 BPM | HEART RATE: 72 BPM | SYSTOLIC BLOOD PRESSURE: 134 MMHG | RESPIRATION RATE: 18 BRPM | RESPIRATION RATE: 18 BRPM | DIASTOLIC BLOOD PRESSURE: 68 MMHG | SYSTOLIC BLOOD PRESSURE: 124 MMHG

## 2020-04-10 ENCOUNTER — HOME CARE VISIT (OUTPATIENT)
Dept: SCHEDULING | Facility: HOME HEALTH | Age: 70
End: 2020-04-10
Payer: COMMERCIAL

## 2020-04-10 PROCEDURE — G0300 HHS/HOSPICE OF LPN EA 15 MIN: HCPCS

## 2020-04-14 ENCOUNTER — HOME CARE VISIT (OUTPATIENT)
Dept: SCHEDULING | Facility: HOME HEALTH | Age: 70
End: 2020-04-14
Payer: COMMERCIAL

## 2020-04-14 VITALS
RESPIRATION RATE: 17 BRPM | OXYGEN SATURATION: 98 % | TEMPERATURE: 97.8 F | DIASTOLIC BLOOD PRESSURE: 72 MMHG | HEART RATE: 82 BPM | SYSTOLIC BLOOD PRESSURE: 130 MMHG

## 2020-04-14 PROCEDURE — G0299 HHS/HOSPICE OF RN EA 15 MIN: HCPCS

## 2020-04-14 PROCEDURE — 400014 HH F/U

## 2020-04-17 ENCOUNTER — HOME CARE VISIT (OUTPATIENT)
Dept: HOME HEALTH SERVICES | Facility: HOME HEALTH | Age: 70
End: 2020-04-17
Payer: COMMERCIAL

## 2020-04-18 ENCOUNTER — HOME CARE VISIT (OUTPATIENT)
Dept: SCHEDULING | Facility: HOME HEALTH | Age: 70
End: 2020-04-18
Payer: COMMERCIAL

## 2020-04-18 PROCEDURE — 400014 HH F/U

## 2020-04-18 PROCEDURE — G0299 HHS/HOSPICE OF RN EA 15 MIN: HCPCS

## 2020-04-19 PROCEDURE — A6251 ABSORPT DRG <=16 SQ IN W/O B: HCPCS

## 2020-04-19 PROCEDURE — A6021 COLLAGEN DRESSING <=16 SQ IN: HCPCS

## 2020-04-20 ENCOUNTER — HOME CARE VISIT (OUTPATIENT)
Dept: HOME HEALTH SERVICES | Facility: HOME HEALTH | Age: 70
End: 2020-04-20
Payer: COMMERCIAL

## 2020-04-20 ENCOUNTER — HOME CARE VISIT (OUTPATIENT)
Dept: SCHEDULING | Facility: HOME HEALTH | Age: 70
End: 2020-04-20
Payer: COMMERCIAL

## 2020-04-20 VITALS
TEMPERATURE: 97.3 F | DIASTOLIC BLOOD PRESSURE: 80 MMHG | OXYGEN SATURATION: 98 % | HEART RATE: 77 BPM | RESPIRATION RATE: 16 BRPM | SYSTOLIC BLOOD PRESSURE: 122 MMHG

## 2020-04-20 VITALS
SYSTOLIC BLOOD PRESSURE: 128 MMHG | HEART RATE: 72 BPM | DIASTOLIC BLOOD PRESSURE: 74 MMHG | OXYGEN SATURATION: 98 % | RESPIRATION RATE: 20 BRPM | TEMPERATURE: 97.6 F

## 2020-04-20 PROCEDURE — G0300 HHS/HOSPICE OF LPN EA 15 MIN: HCPCS

## 2020-04-21 ENCOUNTER — HOME CARE VISIT (OUTPATIENT)
Dept: HOME HEALTH SERVICES | Facility: HOME HEALTH | Age: 70
End: 2020-04-21
Payer: COMMERCIAL

## 2020-04-21 VITALS — RESPIRATION RATE: 15 BRPM | OXYGEN SATURATION: 97 %

## 2020-04-22 ENCOUNTER — HOME CARE VISIT (OUTPATIENT)
Dept: SCHEDULING | Facility: HOME HEALTH | Age: 70
End: 2020-04-22
Payer: COMMERCIAL

## 2020-04-22 VITALS
RESPIRATION RATE: 18 BRPM | OXYGEN SATURATION: 99 % | TEMPERATURE: 97.2 F | SYSTOLIC BLOOD PRESSURE: 136 MMHG | DIASTOLIC BLOOD PRESSURE: 80 MMHG | HEART RATE: 74 BPM

## 2020-04-22 PROCEDURE — G0300 HHS/HOSPICE OF LPN EA 15 MIN: HCPCS

## 2020-04-25 ENCOUNTER — HOME CARE VISIT (OUTPATIENT)
Dept: SCHEDULING | Facility: HOME HEALTH | Age: 70
End: 2020-04-25
Payer: COMMERCIAL

## 2020-04-25 PROCEDURE — G0299 HHS/HOSPICE OF RN EA 15 MIN: HCPCS

## 2020-04-27 ENCOUNTER — HOME CARE VISIT (OUTPATIENT)
Dept: SCHEDULING | Facility: HOME HEALTH | Age: 70
End: 2020-04-27
Payer: COMMERCIAL

## 2020-04-27 VITALS
HEART RATE: 74 BPM | SYSTOLIC BLOOD PRESSURE: 124 MMHG | OXYGEN SATURATION: 99 % | DIASTOLIC BLOOD PRESSURE: 72 MMHG | TEMPERATURE: 98.1 F | RESPIRATION RATE: 16 BRPM

## 2020-04-27 VITALS
TEMPERATURE: 98.7 F | SYSTOLIC BLOOD PRESSURE: 140 MMHG | DIASTOLIC BLOOD PRESSURE: 88 MMHG | HEART RATE: 79 BPM | OXYGEN SATURATION: 99 %

## 2020-04-27 PROCEDURE — G0300 HHS/HOSPICE OF LPN EA 15 MIN: HCPCS

## 2020-04-29 ENCOUNTER — HOME CARE VISIT (OUTPATIENT)
Dept: SCHEDULING | Facility: HOME HEALTH | Age: 70
End: 2020-04-29
Payer: COMMERCIAL

## 2020-04-29 VITALS
HEART RATE: 91 BPM | DIASTOLIC BLOOD PRESSURE: 72 MMHG | OXYGEN SATURATION: 99 % | SYSTOLIC BLOOD PRESSURE: 120 MMHG | TEMPERATURE: 99.6 F

## 2020-04-29 PROCEDURE — G0300 HHS/HOSPICE OF LPN EA 15 MIN: HCPCS

## 2020-05-01 ENCOUNTER — HOME CARE VISIT (OUTPATIENT)
Dept: SCHEDULING | Facility: HOME HEALTH | Age: 70
End: 2020-05-01
Payer: COMMERCIAL

## 2020-05-01 PROCEDURE — G0300 HHS/HOSPICE OF LPN EA 15 MIN: HCPCS

## 2020-05-04 ENCOUNTER — HOME CARE VISIT (OUTPATIENT)
Dept: SCHEDULING | Facility: HOME HEALTH | Age: 70
End: 2020-05-04
Payer: COMMERCIAL

## 2020-05-04 PROCEDURE — G0300 HHS/HOSPICE OF LPN EA 15 MIN: HCPCS

## 2020-05-06 ENCOUNTER — HOME CARE VISIT (OUTPATIENT)
Dept: SCHEDULING | Facility: HOME HEALTH | Age: 70
End: 2020-05-06
Payer: COMMERCIAL

## 2020-05-06 VITALS
DIASTOLIC BLOOD PRESSURE: 76 MMHG | TEMPERATURE: 98.2 F | SYSTOLIC BLOOD PRESSURE: 124 MMHG | RESPIRATION RATE: 18 BRPM | HEART RATE: 80 BPM | OXYGEN SATURATION: 98 %

## 2020-05-06 PROCEDURE — G0300 HHS/HOSPICE OF LPN EA 15 MIN: HCPCS

## 2020-05-07 VITALS
OXYGEN SATURATION: 98 % | TEMPERATURE: 97.2 F | HEART RATE: 77 BPM | RESPIRATION RATE: 18 BRPM | DIASTOLIC BLOOD PRESSURE: 72 MMHG | SYSTOLIC BLOOD PRESSURE: 138 MMHG

## 2020-05-08 ENCOUNTER — HOME CARE VISIT (OUTPATIENT)
Dept: SCHEDULING | Facility: HOME HEALTH | Age: 70
End: 2020-05-08
Payer: COMMERCIAL

## 2020-05-08 PROCEDURE — A6212 FOAM DRG <=16 SQ IN W/BORDER: HCPCS

## 2020-05-08 PROCEDURE — A6446 CONFORM BAND S W>=3" <5"/YD: HCPCS

## 2020-05-08 PROCEDURE — A6021 COLLAGEN DRESSING <=16 SQ IN: HCPCS

## 2020-05-08 PROCEDURE — G0300 HHS/HOSPICE OF LPN EA 15 MIN: HCPCS

## 2020-05-11 ENCOUNTER — HOME CARE VISIT (OUTPATIENT)
Dept: SCHEDULING | Facility: HOME HEALTH | Age: 70
End: 2020-05-11
Payer: COMMERCIAL

## 2020-05-11 VITALS
RESPIRATION RATE: 18 BRPM | SYSTOLIC BLOOD PRESSURE: 136 MMHG | TEMPERATURE: 98.2 F | HEART RATE: 76 BPM | OXYGEN SATURATION: 98 % | DIASTOLIC BLOOD PRESSURE: 72 MMHG

## 2020-05-11 PROCEDURE — G0300 HHS/HOSPICE OF LPN EA 15 MIN: HCPCS

## 2020-05-13 ENCOUNTER — HOME CARE VISIT (OUTPATIENT)
Dept: SCHEDULING | Facility: HOME HEALTH | Age: 70
End: 2020-05-13
Payer: COMMERCIAL

## 2020-05-13 PROCEDURE — G0300 HHS/HOSPICE OF LPN EA 15 MIN: HCPCS

## 2020-05-14 PROCEDURE — A6212 FOAM DRG <=16 SQ IN W/BORDER: HCPCS

## 2020-05-14 PROCEDURE — A5120 SKIN BARRIER, WIPE OR SWAB: HCPCS

## 2020-05-19 ENCOUNTER — HOME CARE VISIT (OUTPATIENT)
Dept: SCHEDULING | Facility: HOME HEALTH | Age: 70
End: 2020-05-19
Payer: COMMERCIAL

## 2020-05-19 PROCEDURE — 400014 HH F/U

## 2020-05-19 PROCEDURE — G0300 HHS/HOSPICE OF LPN EA 15 MIN: HCPCS

## 2020-05-20 VITALS
TEMPERATURE: 97.4 F | HEART RATE: 88 BPM | DIASTOLIC BLOOD PRESSURE: 72 MMHG | RESPIRATION RATE: 18 BRPM | SYSTOLIC BLOOD PRESSURE: 136 MMHG | OXYGEN SATURATION: 99 % | TEMPERATURE: 97.7 F | TEMPERATURE: 98.1 F | OXYGEN SATURATION: 97 % | DIASTOLIC BLOOD PRESSURE: 70 MMHG | DIASTOLIC BLOOD PRESSURE: 76 MMHG | RESPIRATION RATE: 18 BRPM | OXYGEN SATURATION: 97 % | SYSTOLIC BLOOD PRESSURE: 138 MMHG | SYSTOLIC BLOOD PRESSURE: 122 MMHG | SYSTOLIC BLOOD PRESSURE: 128 MMHG | RESPIRATION RATE: 18 BRPM | OXYGEN SATURATION: 99 % | TEMPERATURE: 97.4 F | HEART RATE: 80 BPM | HEART RATE: 72 BPM | HEART RATE: 92 BPM | DIASTOLIC BLOOD PRESSURE: 84 MMHG | RESPIRATION RATE: 18 BRPM

## 2020-05-21 ENCOUNTER — HOME CARE VISIT (OUTPATIENT)
Dept: SCHEDULING | Facility: HOME HEALTH | Age: 70
End: 2020-05-21
Payer: COMMERCIAL

## 2020-05-23 ENCOUNTER — HOME CARE VISIT (OUTPATIENT)
Dept: SCHEDULING | Facility: HOME HEALTH | Age: 70
End: 2020-05-23
Payer: COMMERCIAL

## 2020-05-23 PROCEDURE — G0300 HHS/HOSPICE OF LPN EA 15 MIN: HCPCS

## 2020-05-25 ENCOUNTER — HOME CARE VISIT (OUTPATIENT)
Dept: SCHEDULING | Facility: HOME HEALTH | Age: 70
End: 2020-05-25
Payer: COMMERCIAL

## 2020-05-25 VITALS
RESPIRATION RATE: 16 BRPM | OXYGEN SATURATION: 100 % | TEMPERATURE: 97.4 F | HEART RATE: 76 BPM | SYSTOLIC BLOOD PRESSURE: 122 MMHG | DIASTOLIC BLOOD PRESSURE: 74 MMHG

## 2020-05-25 PROCEDURE — G0300 HHS/HOSPICE OF LPN EA 15 MIN: HCPCS

## 2020-05-26 VITALS
OXYGEN SATURATION: 97 % | HEART RATE: 76 BPM | SYSTOLIC BLOOD PRESSURE: 132 MMHG | DIASTOLIC BLOOD PRESSURE: 68 MMHG | RESPIRATION RATE: 20 BRPM | TEMPERATURE: 98.2 F

## 2020-05-27 ENCOUNTER — HOME CARE VISIT (OUTPATIENT)
Dept: SCHEDULING | Facility: HOME HEALTH | Age: 70
End: 2020-05-27
Payer: COMMERCIAL

## 2020-05-27 PROCEDURE — G0300 HHS/HOSPICE OF LPN EA 15 MIN: HCPCS

## 2020-05-28 VITALS — RESPIRATION RATE: 17 BRPM

## 2020-05-29 ENCOUNTER — HOME CARE VISIT (OUTPATIENT)
Dept: SCHEDULING | Facility: HOME HEALTH | Age: 70
End: 2020-05-29
Payer: COMMERCIAL

## 2020-05-29 PROCEDURE — G0300 HHS/HOSPICE OF LPN EA 15 MIN: HCPCS

## 2020-05-31 VITALS
HEART RATE: 68 BPM | OXYGEN SATURATION: 100 % | RESPIRATION RATE: 17 BRPM | TEMPERATURE: 97.3 F | SYSTOLIC BLOOD PRESSURE: 128 MMHG | DIASTOLIC BLOOD PRESSURE: 76 MMHG

## 2020-06-01 ENCOUNTER — HOME CARE VISIT (OUTPATIENT)
Dept: SCHEDULING | Facility: HOME HEALTH | Age: 70
End: 2020-06-01
Payer: COMMERCIAL

## 2020-06-01 PROCEDURE — G0300 HHS/HOSPICE OF LPN EA 15 MIN: HCPCS

## 2020-06-03 ENCOUNTER — HOME CARE VISIT (OUTPATIENT)
Dept: SCHEDULING | Facility: HOME HEALTH | Age: 70
End: 2020-06-03
Payer: COMMERCIAL

## 2020-06-03 PROCEDURE — A5120 SKIN BARRIER, WIPE OR SWAB: HCPCS

## 2020-06-03 PROCEDURE — A4452 WATERPROOF TAPE: HCPCS

## 2020-06-03 PROCEDURE — G0300 HHS/HOSPICE OF LPN EA 15 MIN: HCPCS

## 2020-06-03 PROCEDURE — A6212 FOAM DRG <=16 SQ IN W/BORDER: HCPCS

## 2020-06-04 VITALS
TEMPERATURE: 98 F | SYSTOLIC BLOOD PRESSURE: 122 MMHG | HEART RATE: 88 BPM | HEART RATE: 74 BPM | OXYGEN SATURATION: 99 % | RESPIRATION RATE: 16 BRPM | DIASTOLIC BLOOD PRESSURE: 68 MMHG | DIASTOLIC BLOOD PRESSURE: 72 MMHG | OXYGEN SATURATION: 100 % | SYSTOLIC BLOOD PRESSURE: 126 MMHG | RESPIRATION RATE: 16 BRPM | TEMPERATURE: 97.9 F

## 2020-06-05 ENCOUNTER — HOME CARE VISIT (OUTPATIENT)
Dept: SCHEDULING | Facility: HOME HEALTH | Age: 70
End: 2020-06-05
Payer: COMMERCIAL

## 2020-06-05 PROCEDURE — G0300 HHS/HOSPICE OF LPN EA 15 MIN: HCPCS

## 2020-06-08 ENCOUNTER — HOME CARE VISIT (OUTPATIENT)
Dept: SCHEDULING | Facility: HOME HEALTH | Age: 70
End: 2020-06-08
Payer: COMMERCIAL

## 2020-06-08 VITALS
DIASTOLIC BLOOD PRESSURE: 78 MMHG | RESPIRATION RATE: 20 BRPM | SYSTOLIC BLOOD PRESSURE: 108 MMHG | TEMPERATURE: 98.7 F | OXYGEN SATURATION: 20 % | HEART RATE: 78 BPM

## 2020-06-08 VITALS
HEART RATE: 80 BPM | TEMPERATURE: 97.6 F | OXYGEN SATURATION: 99 % | DIASTOLIC BLOOD PRESSURE: 70 MMHG | RESPIRATION RATE: 17 BRPM | SYSTOLIC BLOOD PRESSURE: 126 MMHG

## 2020-06-08 PROCEDURE — G0300 HHS/HOSPICE OF LPN EA 15 MIN: HCPCS

## 2020-06-10 ENCOUNTER — HOME CARE VISIT (OUTPATIENT)
Dept: SCHEDULING | Facility: HOME HEALTH | Age: 70
End: 2020-06-10
Payer: COMMERCIAL

## 2020-06-10 PROCEDURE — G0300 HHS/HOSPICE OF LPN EA 15 MIN: HCPCS

## 2020-06-12 ENCOUNTER — HOME CARE VISIT (OUTPATIENT)
Dept: SCHEDULING | Facility: HOME HEALTH | Age: 70
End: 2020-06-12
Payer: COMMERCIAL

## 2020-06-12 PROCEDURE — G0299 HHS/HOSPICE OF RN EA 15 MIN: HCPCS

## 2020-06-15 ENCOUNTER — HOME CARE VISIT (OUTPATIENT)
Dept: SCHEDULING | Facility: HOME HEALTH | Age: 70
End: 2020-06-15
Payer: COMMERCIAL

## 2020-06-15 VITALS
HEART RATE: 74 BPM | TEMPERATURE: 97 F | SYSTOLIC BLOOD PRESSURE: 126 MMHG | RESPIRATION RATE: 20 BRPM | OXYGEN SATURATION: 98 % | DIASTOLIC BLOOD PRESSURE: 66 MMHG

## 2020-06-15 PROCEDURE — G0300 HHS/HOSPICE OF LPN EA 15 MIN: HCPCS

## 2020-06-16 ENCOUNTER — HOME CARE VISIT (OUTPATIENT)
Dept: HOME HEALTH SERVICES | Facility: HOME HEALTH | Age: 70
End: 2020-06-16
Payer: COMMERCIAL

## 2020-06-16 VITALS
SYSTOLIC BLOOD PRESSURE: 118 MMHG | TEMPERATURE: 97 F | RESPIRATION RATE: 17 BRPM | DIASTOLIC BLOOD PRESSURE: 76 MMHG | HEART RATE: 78 BPM | OXYGEN SATURATION: 99 %

## 2020-06-16 PROCEDURE — A6446 CONFORM BAND S W>=3" <5"/YD: HCPCS

## 2020-06-16 PROCEDURE — A6212 FOAM DRG <=16 SQ IN W/BORDER: HCPCS

## 2020-06-16 PROCEDURE — A6021 COLLAGEN DRESSING <=16 SQ IN: HCPCS

## 2020-06-16 PROCEDURE — A6209 FOAM DRSG <=16 SQ IN W/O BDR: HCPCS

## 2020-06-17 ENCOUNTER — HOME CARE VISIT (OUTPATIENT)
Dept: SCHEDULING | Facility: HOME HEALTH | Age: 70
End: 2020-06-17
Payer: COMMERCIAL

## 2020-06-17 PROCEDURE — 400014 HH F/U

## 2020-06-17 PROCEDURE — G0300 HHS/HOSPICE OF LPN EA 15 MIN: HCPCS

## 2020-06-20 ENCOUNTER — HOME CARE VISIT (OUTPATIENT)
Dept: HOME HEALTH SERVICES | Facility: HOME HEALTH | Age: 70
End: 2020-06-20
Payer: COMMERCIAL

## 2020-06-22 ENCOUNTER — HOME CARE VISIT (OUTPATIENT)
Dept: SCHEDULING | Facility: HOME HEALTH | Age: 70
End: 2020-06-22
Payer: COMMERCIAL

## 2020-06-22 VITALS
RESPIRATION RATE: 17 BRPM | DIASTOLIC BLOOD PRESSURE: 74 MMHG | RESPIRATION RATE: 18 BRPM | HEART RATE: 70 BPM | OXYGEN SATURATION: 98 % | SYSTOLIC BLOOD PRESSURE: 126 MMHG | HEART RATE: 72 BPM | DIASTOLIC BLOOD PRESSURE: 82 MMHG | SYSTOLIC BLOOD PRESSURE: 118 MMHG | TEMPERATURE: 97.6 F | OXYGEN SATURATION: 99 % | TEMPERATURE: 97.7 F

## 2020-06-22 PROCEDURE — G0300 HHS/HOSPICE OF LPN EA 15 MIN: HCPCS

## 2020-06-23 VITALS
DIASTOLIC BLOOD PRESSURE: 80 MMHG | HEART RATE: 90 BPM | TEMPERATURE: 98.4 F | SYSTOLIC BLOOD PRESSURE: 132 MMHG | RESPIRATION RATE: 18 BRPM | OXYGEN SATURATION: 100 %

## 2020-06-24 ENCOUNTER — HOME CARE VISIT (OUTPATIENT)
Dept: SCHEDULING | Facility: HOME HEALTH | Age: 70
End: 2020-06-24
Payer: COMMERCIAL

## 2020-06-24 PROCEDURE — G0300 HHS/HOSPICE OF LPN EA 15 MIN: HCPCS

## 2020-06-25 VITALS
DIASTOLIC BLOOD PRESSURE: 72 MMHG | HEART RATE: 82 BPM | RESPIRATION RATE: 18 BRPM | SYSTOLIC BLOOD PRESSURE: 140 MMHG | TEMPERATURE: 98.4 F | OXYGEN SATURATION: 99 %

## 2020-06-26 ENCOUNTER — HOME CARE VISIT (OUTPATIENT)
Dept: SCHEDULING | Facility: HOME HEALTH | Age: 70
End: 2020-06-26
Payer: COMMERCIAL

## 2020-06-26 PROCEDURE — G0300 HHS/HOSPICE OF LPN EA 15 MIN: HCPCS

## 2020-06-28 VITALS
HEART RATE: 79 BPM | TEMPERATURE: 98.6 F | OXYGEN SATURATION: 99 % | SYSTOLIC BLOOD PRESSURE: 130 MMHG | DIASTOLIC BLOOD PRESSURE: 80 MMHG

## 2020-06-29 ENCOUNTER — HOME CARE VISIT (OUTPATIENT)
Dept: SCHEDULING | Facility: HOME HEALTH | Age: 70
End: 2020-06-29
Payer: COMMERCIAL

## 2020-06-29 PROCEDURE — G0300 HHS/HOSPICE OF LPN EA 15 MIN: HCPCS

## 2020-06-30 ENCOUNTER — HOME CARE VISIT (OUTPATIENT)
Dept: HOME HEALTH SERVICES | Facility: HOME HEALTH | Age: 70
End: 2020-06-30
Payer: COMMERCIAL

## 2020-06-30 VITALS
HEART RATE: 80 BPM | TEMPERATURE: 97 F | DIASTOLIC BLOOD PRESSURE: 68 MMHG | OXYGEN SATURATION: 99 % | RESPIRATION RATE: 17 BRPM | SYSTOLIC BLOOD PRESSURE: 130 MMHG

## 2020-07-01 ENCOUNTER — HOME CARE VISIT (OUTPATIENT)
Dept: HOME HEALTH SERVICES | Facility: HOME HEALTH | Age: 70
End: 2020-07-01
Payer: COMMERCIAL

## 2020-07-01 PROCEDURE — A6021 COLLAGEN DRESSING <=16 SQ IN: HCPCS

## 2020-07-01 PROCEDURE — A6212 FOAM DRG <=16 SQ IN W/BORDER: HCPCS

## 2020-07-03 ENCOUNTER — HOME CARE VISIT (OUTPATIENT)
Dept: SCHEDULING | Facility: HOME HEALTH | Age: 70
End: 2020-07-03
Payer: COMMERCIAL

## 2020-07-06 ENCOUNTER — HOME CARE VISIT (OUTPATIENT)
Dept: SCHEDULING | Facility: HOME HEALTH | Age: 70
End: 2020-07-06
Payer: COMMERCIAL

## 2020-07-06 PROCEDURE — G0300 HHS/HOSPICE OF LPN EA 15 MIN: HCPCS

## 2020-07-08 ENCOUNTER — HOME CARE VISIT (OUTPATIENT)
Dept: SCHEDULING | Facility: HOME HEALTH | Age: 70
End: 2020-07-08
Payer: COMMERCIAL

## 2020-07-08 PROCEDURE — A6260 WOUND CLEANSER ANY TYPE/SIZE: HCPCS

## 2020-07-08 PROCEDURE — G0300 HHS/HOSPICE OF LPN EA 15 MIN: HCPCS

## 2020-07-08 PROCEDURE — A6216 NON-STERILE GAUZE<=16 SQ IN: HCPCS

## 2020-07-09 VITALS
DIASTOLIC BLOOD PRESSURE: 70 MMHG | RESPIRATION RATE: 20 BRPM | OXYGEN SATURATION: 97 % | TEMPERATURE: 97.2 F | HEART RATE: 76 BPM | SYSTOLIC BLOOD PRESSURE: 118 MMHG

## 2020-07-10 ENCOUNTER — HOME CARE VISIT (OUTPATIENT)
Dept: SCHEDULING | Facility: HOME HEALTH | Age: 70
End: 2020-07-10
Payer: COMMERCIAL

## 2020-07-10 PROCEDURE — G0300 HHS/HOSPICE OF LPN EA 15 MIN: HCPCS

## 2020-07-12 VITALS
RESPIRATION RATE: 16 BRPM | OXYGEN SATURATION: 99 % | SYSTOLIC BLOOD PRESSURE: 128 MMHG | DIASTOLIC BLOOD PRESSURE: 74 MMHG | HEART RATE: 76 BPM | TEMPERATURE: 97.5 F

## 2020-07-13 ENCOUNTER — HOME CARE VISIT (OUTPATIENT)
Dept: SCHEDULING | Facility: HOME HEALTH | Age: 70
End: 2020-07-13
Payer: COMMERCIAL

## 2020-07-13 PROCEDURE — A6215 FOAM DRESSING WOUND FILLER: HCPCS

## 2020-07-13 PROCEDURE — A6260 WOUND CLEANSER ANY TYPE/SIZE: HCPCS

## 2020-07-13 PROCEDURE — A6212 FOAM DRG <=16 SQ IN W/BORDER: HCPCS

## 2020-07-13 PROCEDURE — A9270 NON-COVERED ITEM OR SERVICE: HCPCS

## 2020-07-13 PROCEDURE — A6022 COLLAGEN DRSG>16<=48 SQ IN: HCPCS

## 2020-07-13 PROCEDURE — G0300 HHS/HOSPICE OF LPN EA 15 MIN: HCPCS

## 2020-07-13 PROCEDURE — A6446 CONFORM BAND S W>=3" <5"/YD: HCPCS

## 2020-07-13 PROCEDURE — A4452 WATERPROOF TAPE: HCPCS

## 2020-07-13 PROCEDURE — A6223 GAUZE >16<=48 NO W/SAL W/O B: HCPCS

## 2020-07-15 ENCOUNTER — HOME CARE VISIT (OUTPATIENT)
Dept: SCHEDULING | Facility: HOME HEALTH | Age: 70
End: 2020-07-15
Payer: COMMERCIAL

## 2020-07-15 VITALS
HEART RATE: 78 BPM | SYSTOLIC BLOOD PRESSURE: 141 MMHG | RESPIRATION RATE: 20 BRPM | TEMPERATURE: 97.9 F | DIASTOLIC BLOOD PRESSURE: 85 MMHG | OXYGEN SATURATION: 97 %

## 2020-07-16 PROCEDURE — 3331090001 HH PPS REVENUE CREDIT

## 2020-07-16 PROCEDURE — 3331090002 HH PPS REVENUE DEBIT

## 2020-07-17 ENCOUNTER — HOME CARE VISIT (OUTPATIENT)
Dept: SCHEDULING | Facility: HOME HEALTH | Age: 70
End: 2020-07-17
Payer: MEDICARE

## 2020-07-17 PROCEDURE — 3331090001 HH PPS REVENUE CREDIT

## 2020-07-17 PROCEDURE — 3331090002 HH PPS REVENUE DEBIT

## 2020-07-17 PROCEDURE — G0300 HHS/HOSPICE OF LPN EA 15 MIN: HCPCS

## 2020-07-17 PROCEDURE — 400014 HH F/U

## 2020-07-18 PROCEDURE — 3331090001 HH PPS REVENUE CREDIT

## 2020-07-18 PROCEDURE — 3331090002 HH PPS REVENUE DEBIT

## 2020-07-19 PROCEDURE — 3331090001 HH PPS REVENUE CREDIT

## 2020-07-19 PROCEDURE — 3331090002 HH PPS REVENUE DEBIT

## 2020-07-20 ENCOUNTER — HOME CARE VISIT (OUTPATIENT)
Dept: HOME HEALTH SERVICES | Facility: HOME HEALTH | Age: 70
End: 2020-07-20
Payer: MEDICARE

## 2020-07-20 PROCEDURE — 3331090001 HH PPS REVENUE CREDIT

## 2020-07-20 PROCEDURE — 3331090002 HH PPS REVENUE DEBIT

## 2020-07-20 PROCEDURE — G0300 HHS/HOSPICE OF LPN EA 15 MIN: HCPCS

## 2020-07-21 PROCEDURE — 3331090002 HH PPS REVENUE DEBIT

## 2020-07-21 PROCEDURE — 3331090001 HH PPS REVENUE CREDIT

## 2020-07-22 ENCOUNTER — HOME CARE VISIT (OUTPATIENT)
Dept: SCHEDULING | Facility: HOME HEALTH | Age: 70
End: 2020-07-22
Payer: MEDICARE

## 2020-07-22 PROCEDURE — G0300 HHS/HOSPICE OF LPN EA 15 MIN: HCPCS

## 2020-07-22 PROCEDURE — 3331090001 HH PPS REVENUE CREDIT

## 2020-07-22 PROCEDURE — 3331090002 HH PPS REVENUE DEBIT

## 2020-07-23 VITALS
HEART RATE: 70 BPM | TEMPERATURE: 97.6 F | RESPIRATION RATE: 17 BRPM | TEMPERATURE: 97 F | SYSTOLIC BLOOD PRESSURE: 126 MMHG | DIASTOLIC BLOOD PRESSURE: 74 MMHG | OXYGEN SATURATION: 98 % | OXYGEN SATURATION: 99 % | SYSTOLIC BLOOD PRESSURE: 130 MMHG | RESPIRATION RATE: 18 BRPM | HEART RATE: 72 BPM | DIASTOLIC BLOOD PRESSURE: 76 MMHG

## 2020-07-23 PROCEDURE — 3331090001 HH PPS REVENUE CREDIT

## 2020-07-23 PROCEDURE — 3331090002 HH PPS REVENUE DEBIT

## 2020-07-24 ENCOUNTER — HOME CARE VISIT (OUTPATIENT)
Dept: SCHEDULING | Facility: HOME HEALTH | Age: 70
End: 2020-07-24
Payer: MEDICARE

## 2020-07-24 PROCEDURE — 3331090002 HH PPS REVENUE DEBIT

## 2020-07-24 PROCEDURE — 3331090001 HH PPS REVENUE CREDIT

## 2020-07-24 PROCEDURE — G0300 HHS/HOSPICE OF LPN EA 15 MIN: HCPCS

## 2020-07-25 PROCEDURE — 3331090001 HH PPS REVENUE CREDIT

## 2020-07-25 PROCEDURE — 3331090002 HH PPS REVENUE DEBIT

## 2020-07-26 PROCEDURE — 3331090001 HH PPS REVENUE CREDIT

## 2020-07-26 PROCEDURE — 3331090002 HH PPS REVENUE DEBIT

## 2020-07-27 ENCOUNTER — HOME CARE VISIT (OUTPATIENT)
Dept: SCHEDULING | Facility: HOME HEALTH | Age: 70
End: 2020-07-27
Payer: MEDICARE

## 2020-07-27 VITALS
RESPIRATION RATE: 18 BRPM | OXYGEN SATURATION: 99 % | SYSTOLIC BLOOD PRESSURE: 138 MMHG | TEMPERATURE: 97.2 F | TEMPERATURE: 98.2 F | SYSTOLIC BLOOD PRESSURE: 140 MMHG | OXYGEN SATURATION: 99 % | DIASTOLIC BLOOD PRESSURE: 74 MMHG | RESPIRATION RATE: 16 BRPM | HEART RATE: 68 BPM | HEART RATE: 80 BPM | DIASTOLIC BLOOD PRESSURE: 70 MMHG

## 2020-07-27 PROCEDURE — 3331090001 HH PPS REVENUE CREDIT

## 2020-07-27 PROCEDURE — 3331090002 HH PPS REVENUE DEBIT

## 2020-07-27 PROCEDURE — G0300 HHS/HOSPICE OF LPN EA 15 MIN: HCPCS

## 2020-07-28 PROCEDURE — 3331090001 HH PPS REVENUE CREDIT

## 2020-07-28 PROCEDURE — 3331090002 HH PPS REVENUE DEBIT

## 2020-07-29 ENCOUNTER — HOME CARE VISIT (OUTPATIENT)
Dept: SCHEDULING | Facility: HOME HEALTH | Age: 70
End: 2020-07-29
Payer: MEDICARE

## 2020-07-29 PROCEDURE — G0300 HHS/HOSPICE OF LPN EA 15 MIN: HCPCS

## 2020-07-29 PROCEDURE — 3331090001 HH PPS REVENUE CREDIT

## 2020-07-29 PROCEDURE — 3331090002 HH PPS REVENUE DEBIT

## 2020-07-30 VITALS
SYSTOLIC BLOOD PRESSURE: 122 MMHG | RESPIRATION RATE: 16 BRPM | DIASTOLIC BLOOD PRESSURE: 70 MMHG | OXYGEN SATURATION: 97 % | HEART RATE: 72 BPM | TEMPERATURE: 97.6 F

## 2020-07-30 VITALS
DIASTOLIC BLOOD PRESSURE: 80 MMHG | HEART RATE: 82 BPM | OXYGEN SATURATION: 98 % | TEMPERATURE: 98.2 F | SYSTOLIC BLOOD PRESSURE: 120 MMHG

## 2020-07-30 PROCEDURE — 3331090001 HH PPS REVENUE CREDIT

## 2020-07-30 PROCEDURE — 3331090002 HH PPS REVENUE DEBIT

## 2020-07-31 ENCOUNTER — HOME CARE VISIT (OUTPATIENT)
Dept: SCHEDULING | Facility: HOME HEALTH | Age: 70
End: 2020-07-31
Payer: MEDICARE

## 2020-07-31 PROCEDURE — 3331090001 HH PPS REVENUE CREDIT

## 2020-07-31 PROCEDURE — 3331090002 HH PPS REVENUE DEBIT

## 2020-07-31 PROCEDURE — G0300 HHS/HOSPICE OF LPN EA 15 MIN: HCPCS

## 2020-08-01 PROCEDURE — 3331090001 HH PPS REVENUE CREDIT

## 2020-08-01 PROCEDURE — 3331090002 HH PPS REVENUE DEBIT

## 2020-08-02 PROCEDURE — 3331090002 HH PPS REVENUE DEBIT

## 2020-08-02 PROCEDURE — 3331090001 HH PPS REVENUE CREDIT

## 2020-08-03 ENCOUNTER — HOSPITAL ENCOUNTER (OUTPATIENT)
Dept: LAB | Age: 70
Discharge: HOME | End: 2020-08-03
Payer: COMMERCIAL

## 2020-08-03 ENCOUNTER — OFFICE VISIT (OUTPATIENT)
Dept: CARDIOLOGY CLINIC | Age: 70
End: 2020-08-03

## 2020-08-03 ENCOUNTER — HOME CARE VISIT (OUTPATIENT)
Dept: SCHEDULING | Facility: HOME HEALTH | Age: 70
End: 2020-08-03
Payer: MEDICARE

## 2020-08-03 VITALS
OXYGEN SATURATION: 99 % | HEIGHT: 67 IN | HEART RATE: 74 BPM | BODY MASS INDEX: 37.9 KG/M2 | DIASTOLIC BLOOD PRESSURE: 64 MMHG | SYSTOLIC BLOOD PRESSURE: 128 MMHG

## 2020-08-03 VITALS
HEART RATE: 84 BPM | TEMPERATURE: 97 F | RESPIRATION RATE: 17 BRPM | SYSTOLIC BLOOD PRESSURE: 132 MMHG | OXYGEN SATURATION: 98 % | DIASTOLIC BLOOD PRESSURE: 70 MMHG

## 2020-08-03 DIAGNOSIS — I42.8 NONISCHEMIC CARDIOMYOPATHY (HCC): Primary | ICD-10-CM

## 2020-08-03 DIAGNOSIS — I50.22 CHRONIC SYSTOLIC HEART FAILURE (HCC): ICD-10-CM

## 2020-08-03 DIAGNOSIS — G82.20 PARAPLEGIA (HCC): ICD-10-CM

## 2020-08-03 DIAGNOSIS — I11.0 HYPERTENSIVE HEART DISEASE WITH HEART FAILURE (HCC): ICD-10-CM

## 2020-08-03 DIAGNOSIS — I42.8 NONISCHEMIC CARDIOMYOPATHY (HCC): ICD-10-CM

## 2020-08-03 DIAGNOSIS — R06.02 SOB (SHORTNESS OF BREATH): ICD-10-CM

## 2020-08-03 DIAGNOSIS — Z95.810 BIVENTRICULAR IMPLANTABLE CARDIOVERTER-DEFIBRILLATOR IN SITU: ICD-10-CM

## 2020-08-03 DIAGNOSIS — I44.7 LEFT BUNDLE BRANCH BLOCK (LBBB) ON ELECTROCARDIOGRAM: ICD-10-CM

## 2020-08-03 LAB
ANION GAP SERPL CALC-SCNC: 4 MMOL/L (ref 3–18)
BASOPHILS # BLD: 0.1 K/UL (ref 0–0.06)
BASOPHILS NFR BLD: 1 % (ref 0–3)
BNP SERPL-MCNC: 959 PG/ML (ref 0–900)
BUN SERPL-MCNC: 30 MG/DL (ref 7–18)
BUN/CREAT SERPL: 19 (ref 12–20)
CALCIUM SERPL-MCNC: 9.5 MG/DL (ref 8.5–10.1)
CHLORIDE SERPL-SCNC: 105 MMOL/L (ref 100–111)
CO2 SERPL-SCNC: 32 MMOL/L (ref 21–32)
CREAT SERPL-MCNC: 1.57 MG/DL (ref 0.6–1.3)
DIFFERENTIAL METHOD BLD: ABNORMAL
EOSINOPHIL # BLD: 0.2 K/UL (ref 0–0.4)
EOSINOPHIL NFR BLD: 3 % (ref 0–5)
ERYTHROCYTE [DISTWIDTH] IN BLOOD BY AUTOMATED COUNT: 17.1 % (ref 11.6–14.5)
GLUCOSE SERPL-MCNC: 141 MG/DL (ref 74–99)
HCT VFR BLD AUTO: 33.8 % (ref 35–45)
HGB BLD-MCNC: 11.3 G/DL (ref 12–16)
LYMPHOCYTES # BLD: 2.2 K/UL (ref 0.8–3.5)
LYMPHOCYTES NFR BLD: 34 % (ref 20–51)
MAGNESIUM SERPL-MCNC: 2.1 MG/DL (ref 1.6–2.6)
MCH RBC QN AUTO: 27 PG (ref 24–34)
MCHC RBC AUTO-ENTMCNC: 33.4 G/DL (ref 31–37)
MCV RBC AUTO: 80.9 FL (ref 74–97)
MONOCYTES # BLD: 0.5 K/UL (ref 0–1)
MONOCYTES NFR BLD: 7 % (ref 2–9)
NEUTS BAND NFR BLD MANUAL: 1 % (ref 0–5)
NEUTS SEG # BLD: 3.5 K/UL (ref 1.8–8)
NEUTS SEG NFR BLD: 54 % (ref 42–75)
PLATELET # BLD AUTO: 237 K/UL (ref 135–420)
PLATELET COMMENTS,PCOM: ABNORMAL
PMV BLD AUTO: 9.9 FL (ref 9.2–11.8)
POTASSIUM SERPL-SCNC: 4.2 MMOL/L (ref 3.5–5.5)
RBC # BLD AUTO: 4.18 M/UL (ref 4.2–5.3)
RBC MORPH BLD: ABNORMAL
SODIUM SERPL-SCNC: 141 MMOL/L (ref 136–145)
WBC # BLD AUTO: 6.5 K/UL (ref 4.6–13.2)

## 2020-08-03 PROCEDURE — A6251 ABSORPT DRG <=16 SQ IN W/O B: HCPCS

## 2020-08-03 PROCEDURE — A6215 FOAM DRESSING WOUND FILLER: HCPCS

## 2020-08-03 PROCEDURE — 3331090001 HH PPS REVENUE CREDIT

## 2020-08-03 PROCEDURE — 85025 COMPLETE CBC W/AUTO DIFF WBC: CPT

## 2020-08-03 PROCEDURE — 83880 ASSAY OF NATRIURETIC PEPTIDE: CPT

## 2020-08-03 PROCEDURE — G0300 HHS/HOSPICE OF LPN EA 15 MIN: HCPCS

## 2020-08-03 PROCEDURE — 83735 ASSAY OF MAGNESIUM: CPT

## 2020-08-03 PROCEDURE — 36415 COLL VENOUS BLD VENIPUNCTURE: CPT

## 2020-08-03 PROCEDURE — 80048 BASIC METABOLIC PNL TOTAL CA: CPT

## 2020-08-03 PROCEDURE — 3331090002 HH PPS REVENUE DEBIT

## 2020-08-03 NOTE — PATIENT INSTRUCTIONS
Bnp, bmp,mag Echo Follow up 3 months If you have not heard from the central scheduler to schedule your testing in 48 hours, please call [phone].

## 2020-08-03 NOTE — PROGRESS NOTES
Marta Beltran presents today for   Chief Complaint   Patient presents with    Leg Swelling     3 WEEKS     Shortness of Breath    Arm Pain     LEFT ARM     Palpitations     RACING 2 DAYS AGO        Wen Hendricks preferred language for health care discussion is english/other. Is someone accompanying this pt? yes    Is the patient using any DME equipment during OV? yes    Depression Screening:  3 most recent PHQ Screens 1/7/2020   Little interest or pleasure in doing things Not at all   Feeling down, depressed, irritable, or hopeless Not at all   Total Score PHQ 2 0       Learning Assessment:  Learning Assessment 5/8/2019   PRIMARY LEARNER Patient   HIGHEST LEVEL OF EDUCATION - PRIMARY LEARNER  -   BARRIERS PRIMARY LEARNER -   CO-LEARNER CAREGIVER -   PRIMARY LANGUAGE ENGLISH   LEARNER PREFERENCE PRIMARY DEMONSTRATION   ANSWERED BY patient   RELATIONSHIP SELF       Abuse Screening:  Abuse Screening Questionnaire 5/8/2019   Do you ever feel afraid of your partner? N   Are you in a relationship with someone who physically or mentally threatens you? N   Is it safe for you to go home? Y       Fall Risk  Fall Risk Assessment, last 12 mths 1/7/2020   Able to walk? No   Fall in past 12 months? -       Pt currently taking Anticoagulant therapy? no    Coordination of Care:  1. Have you been to the ER, urgent care clinic since your last visit? Hospitalized since your last visit? yes    2. Have you seen or consulted any other health care providers outside of the [address] [place] [address] since your last visit? Include any pap smears or colon screening.  no

## 2020-08-03 NOTE — PROGRESS NOTES
HPI:  I saw Antonina Mullins D. Glennie Frankel in my office in cardiovascular evaluation regarding her dilated nonischemic cardiomyopathy. Ms. Glennie Frankel is a very pleasant 68-year-old fair skinned -American female with history of a dilated cardiomyopathy with mild left ventricular dysfunction and an ejection fraction in the 40 to 45% range with widely patent coronary arteries and a cardiac catheterization back in August 1996. An echocardiogram completed on May 5, 2017 suggested an ejection fraction of 50%. She did have a biventricular AICD placed but due to trauma and infection in that area she had that removed and now she has a biventricular pacemaker with pulse generator residing in her left upper quadrant placed back in October 2012.     Unfortunately, she fell at home and traumatized her back developing an infection of her right Psoas muscle hematoma with development of epidural abscess with neurologic compromise resulting in paraplegia back in April 2017.     She had her pacemaker generator changed by Dr. Ana Valenzuela back on February 26, 2019. It should be noted that she had an infected right foot and was on antibiotics for for some time before the generator change and she is now off antibiotics and foot is now healing by secondary intention and apparently is doing well according to the patient. She has had some problems with chronic heart failure for which we have tried to adjust her diuretics and her last echocardiogram which was completed on December 13, 2019 demonstrated an ejection fraction of 20 to 25% suggesting no significant benefit from [facility] she is been on now for several months. .    She comes in today and relates that she is somewhat more short of breath with activity and is having more lower extremity edema for the past 3 weeks. .  She does have chronic fatigue and does sleep with her head up, but denies any anginal type chest pain. Encounter Diagnoses   Name Primary?     Nonischemic cardiomyopathy ([facility] Yes  Chronic systolic heart failure (HCC)     Biventricular implantable cardioverter-defibrillator in situ     Left bundle branch block (LBBB) on electrocardiogram     Hypertensive heart disease with heart failure (HCC)     Paraplegia (HCC)     SOB (shortness of breath)        Discussion: This lady appears to be doing fair, but she is getting short of breath with any significant exertion and continues to have problems with peripheral edema. I am going to go ahead and repeat some of her lab work including a CBC, BMP, BNP, and magnesium. We have increased Entresto to its maximal dose about 4 months ago so I am going to recheck an echo to see if there is any improvement in overall left ventricular function. We did check her dual-chamber Medtronic pacemaker and she has 6 years remaining on the battery with atrial pacing 2.6% of the time and biventricular pacing 100% of the time and the OptiVol appeared to be suggesting that her fluid status was not increased but at a normal level. I will review all of these laboratory tests and make further recommendations pending that review and her course. PCP: Verner Carrie, MD        Past Medical History:   Diagnosis Date    Acute paraplegia ([facility] 4/20/2017    Benign hypertensive heart disease with systolic CHF, NYHA class 2 ([address] 9/5/2012    Biventricular implantable cardioverter-defibrillator in situ 04/28/2005    Upgraded to BiV AICD; gen change 4/2008; pocket revision 10/2009; Abdominal - done on 8/22/2012 by Dr. Leatha Lazaro Cardiac cath 08/15/1996    Patent coronaries. Elev LVEDP. EF 50-55%.  Cardiac echocardiogram 06/23/2015    Ltd study. EF 45-50%. Mild, diffuse hypk. Severe apical hypk. No mass or thrombus was clearly identified, although imaging was suboptimal.      Cardiac nuclear imaging test 06/19/2015    Fixed distal apical, distal septal defect more likely due to RV pacing than prior infarct. No ischemia. EF 46%.   RWMA c/w RV pacing. Nondiagnostic EKG on pharm stress test.      Cardiovascular lower extremity venous duplex 09/04/2012    Acute, non-occlusive DVT in CFV on right. No DVT on left. No superficial thrombosis bilaterally.  Cardiovascular upper extremity venous duplex 08/27/2012    DVT in axillary vein on left. Left subclavian was not visualized.     Chronic anemia 9/5/2012    Chronic systolic heart failure (HCC)     Decreased calculated glomerular filtration rate (GFR) 3/30/2017    Calculated GFR equivalent to that of CKD stage 3 = 30-59 ml/min    Diabetic neuropathy associated with type 2 diabetes mellitus ([address] 6/28/2011    Difficult airway for intubation 08/22/2012    see anesthesia airway note    Dyslipidemia 6/28/2011    Gout     History of complete heart block 06/28/2011    History of Coumadin therapy     Anticoagulation for DVT of the LUE; Discontinued on 3/30/2017    History of deep venous thrombosis 9/5/2012    Left upper extremity    History of pyelonephritis 3/30/2017    Left bundle branch block (LBBB) on electrocardiogram 06/28/2011    Nonischemic cardiomyopathy ([address] 6/28/2011    Obesity (BMI 35.0-39.9 without comorbidity) 3/13/2017    Obstructive sleep apnea on CPAP 2/7/2012    Psoas abscess, right ([address] 4/20/2017    Psoas hematoma, right, secondary to anticoagulant therapy 3/30/2017    Type 2 diabetes mellitus with diabetic neuropathy ([address] 6/28/2011         Past Surgical History:   Procedure Laterality Date    HX CARPAL TUNNEL RELEASE  4/07    right     HX CHOLECYSTECTOMY  1994    HX HYSTERECTOMY  1973    HX OTHER SURGICAL  6/11/2012    AICD revision    HX PACEMAKER  4/28/2005    [employer] AICD    OK REMVL PERM PM PLS GEN W/REPL PLSE GEN 2 LEAD SYS N/A 2/26/2019    REMOVE & REPLACE PPM GEN DUAL LEAD performed by Amee Byrd MD at [facility] CATH LAB     Current Outpatient Medications   Medication Sig    nitrofurantoin, macrocrystal-monohydrate, (MACROBID) 100 mg capsule Take 1 Cap by mouth two (2) times a day.  VITAMIN D3-VITAMIN K2, MK4, PO Take 1 Tab by mouth daily.  cyanocobalamin/folic acid (vitamin F97-JHRAC acid) 8,104-764 mcg lozg Take 2 Tabs by mouth daily.  ZINC GLUCONATE PO Take 25 mg by mouth daily.  POTASSIUM IODIDE PO Take 15 mg by mouth daily.  magnesium hydroxide (Camara Milk of Magnesia) 400 mg/5 mL suspension Take 5 mL by mouth as needed for Constipation.  levoFLOXacin (LEVAQUIN) 500 mg tablet Take 500 mg by mouth daily. take for 7 days    furosemide (LASIX) 40 mg tablet Take 60 mg by mouth daily.  carvedilol (COREG) 6.25 mg tablet Take 1 Tab by mouth two (2) times a day.  sacubitril-valsartan (ENTRESTO) 97 mg/103 mg tablet Take 1 Tab by mouth two (2) times a day.  insulin glargine (LANTUS U-100 INSULIN) 100 unit/mL injection 10 Units by SubCUTAneous route nightly. Indications: type 2 diabetes mellitus    exenatide microspheres (BYDUREON) 2 mg serr 2 mg by SubCUTAneous route every seven (7) days.  multivitamin (ONE A DAY) tablet Take 1 Tab by mouth daily.  metFORMIN ER (GLUCOPHAGE XR) 500 mg tablet Take 500 mg by mouth two (2) times daily (with meals).  cholecalciferol, VITAMIN D3, (VITAMIN D3) 5,000 unit tab tablet Take 5,000 Units by mouth daily. take 1 tab daily of vitamin D3, 5000 units    gabapentin (NEURONTIN) 300 mg capsule Take 2 Caps by mouth two (2) times a day. Indications: NEUROPATHIC PAIN    IODINE PO Take 10 mg by mouth daily.  alirocumab (Praluent Pen) 75 mg/mL injector pen 1 mL by SubCUTAneous route Once every 2 weeks. No current facility-administered medications for this visit.           Allergies   Allergen Reactions    Latex Itching    Vancomycin Itching    Ampicillin Itching    Bactrim [Sulfamethoxazole-Trimethoprim] Itching    Blueberry Swelling     Causes throat swelling    Ciprofloxacin Itching    Codeine Other (comments)     Jumpy feeling    Crestor [Rosuvastatin] Itching    Darvocet A500 [Propoxyphene N-Acetaminophen] Itching    Demerol [Meperidine] Itching    Levaquin [Levofloxacin] Itching    Lipitor [Atorvastatin] Myalgia    Magnesium Oxide Itching     nausea    Minocin [Minocycline] Itching    Pcn [Penicillins] Itching    Pravachol [Pravastatin] Swelling     Swelling in mouth. Not allergic per patient, takes at home    Shellfish Derived Swelling     itching    Sulfa (Sulfonamide Antibiotics) Itching    Ultracet [Tramadol-Acetaminophen] Itching    Vicodin [Hydrocodone-Acetaminophen] Itching    Vytorin 10-10 [Ezetimibe-Simvastatin] Myalgia    Percodan [Oxycodone Hcl-Oxycodone-Asa] Itching         Social   Social History     Tobacco Use    Smoking status: Never Smoker    Smokeless tobacco: Never Used   Substance Use Topics    Alcohol use: Not Currently         Family history: family history includes Cancer in her father. Review of Systems:     Constitutional: Positive for chills. Negative for fever, malaise/fatigue and weight loss. Respiratory: Positive for shortness of breath. Negative for cough, hemoptysis and wheezing. Cardiovascular: Positive for orthopnea and leg swelling. Negative for chest pain. Gastrointestinal: Negative. Musculoskeletal: Positive for joint pain and myalgias. Negative for falls. Neurological: Negative for dizziness. Physical Exam:   The patient is an alert, oriented, well developed, well nourished 79 y.o.  female who was in no acute distress at the time of my examination. Visit Vitals  /64   Pulse 74   Ht 5' 7\" (1.702 m)   SpO2 99%   BMI 37.90 kg/m²      BP Readings from Last 3 Encounters:   08/03/20 128/64   07/31/20 132/70   07/29/20 122/70        Wt Readings from Last 3 Encounters:   04/29/20 242 lb (109.8 kg)   04/08/20 242 lb (109.8 kg)   02/11/20 242 lb (109.8 kg)       HEENT: Conjunctivae pink, sclera clear and white. Neck: Supple without masses or tenderness. There is mild thyromegaly.   No clear jugular venous distention. Carotid upstrokes are full bilaterally, without bruits. Cardiovascular: Chest is symmetrical with good excursion. There  keloid over the incision in left upper chest in former pacemaker site. [place] is displaced between the MCL and AAL. No lifts, heaves, or thrills felt on palpation. Normal S1 and S2 with a paradoxical splitting of the S2, with a grade II/VI DEE along the left sternal border, with radiation to the base without diastolic murmur. Lungs: Clear to ausculation. Abdomen: Protuberant and soft non tender. It was not adequately evaluated since the patient was in a wheelchair at the time of evaluation. Extremities: 1-2 + edema on the left leg and 1 + edema on the right leg. Neurologic exam: was not completed but the patient is a paraplegic. Review of Data: Please refer to past medical history for most recent cardiac testing. Results for orders placed or performed in visit on 01/07/20   AMB POC EKG ROUTINE W/ 12 LEADS, INTER & REP     Status: None    Narrative    Read by Freddy Sierra DO. Atrially sensed and ventricularly paced rhythm with heart rate 88. Freddy Sierra D.O., F.A.C.C. [facility]  [facility]  [address] [place] [address]. [address]    PLEASE NOTE:  This document has been produced using voice recognition software. Unrecognized errors in transcription may be present.

## 2020-08-04 VITALS
TEMPERATURE: 97.5 F | SYSTOLIC BLOOD PRESSURE: 128 MMHG | OXYGEN SATURATION: 97 % | DIASTOLIC BLOOD PRESSURE: 74 MMHG | RESPIRATION RATE: 16 BRPM | HEART RATE: 72 BPM

## 2020-08-04 PROCEDURE — 3331090002 HH PPS REVENUE DEBIT

## 2020-08-04 PROCEDURE — 3331090001 HH PPS REVENUE CREDIT

## 2020-08-05 ENCOUNTER — HOME CARE VISIT (OUTPATIENT)
Dept: SCHEDULING | Facility: HOME HEALTH | Age: 70
End: 2020-08-05
Payer: MEDICARE

## 2020-08-05 ENCOUNTER — TELEPHONE (OUTPATIENT)
Dept: CARDIOLOGY CLINIC | Age: 70
End: 2020-08-05

## 2020-08-05 PROCEDURE — 3331090001 HH PPS REVENUE CREDIT

## 2020-08-05 PROCEDURE — 3331090002 HH PPS REVENUE DEBIT

## 2020-08-05 PROCEDURE — G0300 HHS/HOSPICE OF LPN EA 15 MIN: HCPCS

## 2020-08-05 NOTE — TELEPHONE ENCOUNTER
I called and talked to the patient about her laboratory work and her renal function appeared to be slightly worse but her NT proBNP was just a little over 900 so it would appear that her heart failure is fairly well compensated. She is concerned about her edema but I told her that we may have to just let her have some edema so were not pushing her kidneys too hard. She is on Lasix 60 mg daily right now and is going to be getting blood work for Dr. Brian Guerra on the 18th or 19 July so I told her that starting on July 8 she should increase her Lasix to 80 mg daily for the following 10 days and when she gets her blood work to back off to the 60 mg. That way we can get an idea of how her renal function will react to increase Lasix and she can also see whether the edema gets any better with the increase Lasix and then she can further discuss it with Dr. Brian Guerra when she sees him.  ES

## 2020-08-06 PROCEDURE — 3331090002 HH PPS REVENUE DEBIT

## 2020-08-06 PROCEDURE — 3331090001 HH PPS REVENUE CREDIT

## 2020-08-07 ENCOUNTER — HOME CARE VISIT (OUTPATIENT)
Dept: SCHEDULING | Facility: HOME HEALTH | Age: 70
End: 2020-08-07
Payer: MEDICARE

## 2020-08-07 PROCEDURE — 3331090001 HH PPS REVENUE CREDIT

## 2020-08-07 PROCEDURE — 3331090002 HH PPS REVENUE DEBIT

## 2020-08-07 PROCEDURE — G0300 HHS/HOSPICE OF LPN EA 15 MIN: HCPCS

## 2020-08-08 PROCEDURE — 3331090001 HH PPS REVENUE CREDIT

## 2020-08-08 PROCEDURE — 3331090002 HH PPS REVENUE DEBIT

## 2020-08-09 PROCEDURE — 3331090002 HH PPS REVENUE DEBIT

## 2020-08-09 PROCEDURE — 3331090001 HH PPS REVENUE CREDIT

## 2020-08-10 ENCOUNTER — HOME CARE VISIT (OUTPATIENT)
Dept: SCHEDULING | Facility: HOME HEALTH | Age: 70
End: 2020-08-10
Payer: MEDICARE

## 2020-08-10 PROCEDURE — 3331090002 HH PPS REVENUE DEBIT

## 2020-08-10 PROCEDURE — 3331090001 HH PPS REVENUE CREDIT

## 2020-08-11 PROCEDURE — 3331090001 HH PPS REVENUE CREDIT

## 2020-08-11 PROCEDURE — 3331090002 HH PPS REVENUE DEBIT

## 2020-08-12 ENCOUNTER — HOME CARE VISIT (OUTPATIENT)
Dept: SCHEDULING | Facility: HOME HEALTH | Age: 70
End: 2020-08-12
Payer: MEDICARE

## 2020-08-12 PROCEDURE — G0300 HHS/HOSPICE OF LPN EA 15 MIN: HCPCS

## 2020-08-12 PROCEDURE — 3331090002 HH PPS REVENUE DEBIT

## 2020-08-12 PROCEDURE — 3331090001 HH PPS REVENUE CREDIT

## 2020-08-13 PROCEDURE — 3331090001 HH PPS REVENUE CREDIT

## 2020-08-13 PROCEDURE — 3331090002 HH PPS REVENUE DEBIT

## 2020-08-14 ENCOUNTER — HOME CARE VISIT (OUTPATIENT)
Dept: SCHEDULING | Facility: HOME HEALTH | Age: 70
End: 2020-08-14
Payer: COMMERCIAL

## 2020-08-14 VITALS
OXYGEN SATURATION: 98 % | HEART RATE: 68 BPM | RESPIRATION RATE: 18 BRPM | DIASTOLIC BLOOD PRESSURE: 78 MMHG | SYSTOLIC BLOOD PRESSURE: 138 MMHG | TEMPERATURE: 97.2 F

## 2020-08-14 PROCEDURE — G0299 HHS/HOSPICE OF RN EA 15 MIN: HCPCS

## 2020-08-14 PROCEDURE — 3331090001 HH PPS REVENUE CREDIT

## 2020-08-14 PROCEDURE — 3331090002 HH PPS REVENUE DEBIT

## 2020-08-15 PROCEDURE — 400014 HH F/U

## 2020-08-15 PROCEDURE — 3331090002 HH PPS REVENUE DEBIT

## 2020-08-15 PROCEDURE — 3331090001 HH PPS REVENUE CREDIT

## 2020-08-15 PROCEDURE — 400013 HH SOC

## 2020-08-16 PROCEDURE — A6212 FOAM DRG <=16 SQ IN W/BORDER: HCPCS

## 2020-08-16 PROCEDURE — A6260 WOUND CLEANSER ANY TYPE/SIZE: HCPCS

## 2020-08-16 PROCEDURE — 3331090002 HH PPS REVENUE DEBIT

## 2020-08-16 PROCEDURE — 3331090001 HH PPS REVENUE CREDIT

## 2020-08-17 ENCOUNTER — HOSPITAL ENCOUNTER (OUTPATIENT)
Dept: NON INVASIVE DIAGNOSTICS | Age: 70
Discharge: HOME | End: 2020-08-17
Attending: INTERNAL MEDICINE
Payer: COMMERCIAL

## 2020-08-17 ENCOUNTER — HOME CARE VISIT (OUTPATIENT)
Dept: SCHEDULING | Facility: HOME HEALTH | Age: 70
End: 2020-08-17
Payer: COMMERCIAL

## 2020-08-17 VITALS
HEIGHT: 67 IN | WEIGHT: 242 LBS | BODY MASS INDEX: 37.98 KG/M2 | SYSTOLIC BLOOD PRESSURE: 128 MMHG | DIASTOLIC BLOOD PRESSURE: 64 MMHG

## 2020-08-17 DIAGNOSIS — I50.22 CHRONIC SYSTOLIC HEART FAILURE (HCC): ICD-10-CM

## 2020-08-17 DIAGNOSIS — I42.8 NONISCHEMIC CARDIOMYOPATHY (HCC): ICD-10-CM

## 2020-08-17 DIAGNOSIS — R06.02 SOB (SHORTNESS OF BREATH): ICD-10-CM

## 2020-08-17 LAB
ECHO AO ROOT DIAM: 3.51 CM
ECHO LA AREA 4C: 18.85 CM2
ECHO LA VOL 2C: 59.24 ML (ref 22–52)
ECHO LA VOL 4C: 51.44 ML (ref 22–52)
ECHO LA VOL BP: 60.37 ML (ref 22–52)
ECHO LA VOL/BSA BIPLANE: 27.53 ML/M2 (ref 16–28)
ECHO LA VOLUME INDEX A2C: 27.02 ML/M2 (ref 16–28)
ECHO LA VOLUME INDEX A4C: 23.46 ML/M2 (ref 16–28)
ECHO LV E' LATERAL VELOCITY: 4.75 CM/S
ECHO LV E' SEPTAL VELOCITY: 4.86 CM/S
ECHO LV INTERNAL DIMENSION DIASTOLIC: 5.44 CM (ref 3.9–5.3)
ECHO LV INTERNAL DIMENSION SYSTOLIC: 4.68 CM
ECHO LV IVSD: 1.01 CM (ref 0.6–0.9)
ECHO LV MASS 2D: 229 G (ref 67–162)
ECHO LV MASS INDEX 2D: 104.4 G/M2 (ref 43–95)
ECHO LV POSTERIOR WALL DIASTOLIC: 1.13 CM (ref 0.6–0.9)
ECHO LVOT CARDIAC OUTPUT: 4.03 LITER/MINUTE
ECHO LVOT DIAM: 1.98 CM
ECHO LVOT PEAK GRADIENT: 2.8 MMHG
ECHO LVOT PEAK VELOCITY: 83.72 CM/S
ECHO LVOT SV: 60.2 ML
ECHO LVOT VTI: 19.53 CM
ECHO MV A VELOCITY: 105.11 CM/S
ECHO MV E DECELERATION TIME (DT): 0.22 S
ECHO MV E VELOCITY: 74.66 CM/S
ECHO MV E/A RATIO: 0.71
ECHO MV E/E' LATERAL: 15.72
ECHO MV E/E' RATIO (AVERAGED): 15.54
ECHO MV E/E' SEPTAL: 15.36
ECHO RV TAPSE: 1.77 CM (ref 1.5–2)
ECHO TV REGURGITANT MAX VELOCITY: 256.53 CM/S
ECHO TV REGURGITANT PEAK GRADIENT: 26.32 MMHG
LVOT MG: 1.48 MMHG

## 2020-08-17 PROCEDURE — 400014 HH F/U

## 2020-08-17 PROCEDURE — C8929 TTE W OR WO FOL WCON,DOPPLER: HCPCS

## 2020-08-17 PROCEDURE — 3331090001 HH PPS REVENUE CREDIT

## 2020-08-17 PROCEDURE — G0300 HHS/HOSPICE OF LPN EA 15 MIN: HCPCS

## 2020-08-17 PROCEDURE — 74011250636 HC RX REV CODE- 250/636: Performed by: INTERNAL MEDICINE

## 2020-08-17 PROCEDURE — 3331090002 HH PPS REVENUE DEBIT

## 2020-08-17 PROCEDURE — A6215 FOAM DRESSING WOUND FILLER: HCPCS

## 2020-08-17 PROCEDURE — A6251 ABSORPT DRG <=16 SQ IN W/O B: HCPCS

## 2020-08-17 RX ADMIN — PERFLUTREN 2 ML: 6.52 INJECTION, SUSPENSION INTRAVENOUS at 12:34

## 2020-08-18 VITALS — RESPIRATION RATE: 16 BRPM

## 2020-08-18 PROCEDURE — 3331090002 HH PPS REVENUE DEBIT

## 2020-08-18 PROCEDURE — 3331090001 HH PPS REVENUE CREDIT

## 2020-08-19 ENCOUNTER — HOME CARE VISIT (OUTPATIENT)
Dept: SCHEDULING | Facility: HOME HEALTH | Age: 70
End: 2020-08-19
Payer: COMMERCIAL

## 2020-08-19 PROCEDURE — G0300 HHS/HOSPICE OF LPN EA 15 MIN: HCPCS

## 2020-08-19 PROCEDURE — 3331090002 HH PPS REVENUE DEBIT

## 2020-08-19 PROCEDURE — 3331090001 HH PPS REVENUE CREDIT

## 2020-08-20 PROCEDURE — 3331090002 HH PPS REVENUE DEBIT

## 2020-08-20 PROCEDURE — 3331090001 HH PPS REVENUE CREDIT

## 2020-08-20 NOTE — PROGRESS NOTES
Per your last note Discussion: This lady appears to be doing fair, but she is getting short of breath with any significant exertion and continues to have problems with peripheral edema. I am going to go ahead and repeat some of her lab work including a CBC, BMP, BNP, and magnesium.     We have increased Entresto to its maximal dose about 4 months ago so I am going to recheck an echo to see if there is any improvement in overall left ventricular function.

## 2020-08-21 ENCOUNTER — HOME CARE VISIT (OUTPATIENT)
Dept: SCHEDULING | Facility: HOME HEALTH | Age: 70
End: 2020-08-21
Payer: COMMERCIAL

## 2020-08-21 PROCEDURE — A5120 SKIN BARRIER, WIPE OR SWAB: HCPCS

## 2020-08-21 PROCEDURE — 3331090001 HH PPS REVENUE CREDIT

## 2020-08-21 PROCEDURE — 3331090002 HH PPS REVENUE DEBIT

## 2020-08-21 PROCEDURE — A6212 FOAM DRG <=16 SQ IN W/BORDER: HCPCS

## 2020-08-21 PROCEDURE — G0300 HHS/HOSPICE OF LPN EA 15 MIN: HCPCS

## 2020-08-21 PROCEDURE — A6446 CONFORM BAND S W>=3" <5"/YD: HCPCS

## 2020-08-22 PROCEDURE — 3331090001 HH PPS REVENUE CREDIT

## 2020-08-22 PROCEDURE — 3331090002 HH PPS REVENUE DEBIT

## 2020-08-23 PROCEDURE — 3331090001 HH PPS REVENUE CREDIT

## 2020-08-23 PROCEDURE — 3331090002 HH PPS REVENUE DEBIT

## 2020-08-24 ENCOUNTER — HOME CARE VISIT (OUTPATIENT)
Dept: SCHEDULING | Facility: HOME HEALTH | Age: 70
End: 2020-08-24
Payer: COMMERCIAL

## 2020-08-24 PROCEDURE — 3331090002 HH PPS REVENUE DEBIT

## 2020-08-24 PROCEDURE — 3331090001 HH PPS REVENUE CREDIT

## 2020-08-24 PROCEDURE — G0300 HHS/HOSPICE OF LPN EA 15 MIN: HCPCS

## 2020-08-25 PROCEDURE — 3331090001 HH PPS REVENUE CREDIT

## 2020-08-25 PROCEDURE — 3331090002 HH PPS REVENUE DEBIT

## 2020-08-26 ENCOUNTER — HOME CARE VISIT (OUTPATIENT)
Dept: SCHEDULING | Facility: HOME HEALTH | Age: 70
End: 2020-08-26
Payer: COMMERCIAL

## 2020-08-26 PROCEDURE — A6199 ALGINATE DRSG WOUND FILLER: HCPCS

## 2020-08-26 PROCEDURE — 3331090002 HH PPS REVENUE DEBIT

## 2020-08-26 PROCEDURE — G0300 HHS/HOSPICE OF LPN EA 15 MIN: HCPCS

## 2020-08-26 PROCEDURE — 3331090001 HH PPS REVENUE CREDIT

## 2020-08-27 PROCEDURE — 3331090002 HH PPS REVENUE DEBIT

## 2020-08-27 PROCEDURE — 3331090001 HH PPS REVENUE CREDIT

## 2020-08-28 ENCOUNTER — HOME CARE VISIT (OUTPATIENT)
Dept: SCHEDULING | Facility: HOME HEALTH | Age: 70
End: 2020-08-28
Payer: COMMERCIAL

## 2020-08-28 VITALS
HEART RATE: 68 BPM | DIASTOLIC BLOOD PRESSURE: 72 MMHG | SYSTOLIC BLOOD PRESSURE: 120 MMHG | OXYGEN SATURATION: 99 % | RESPIRATION RATE: 18 BRPM | TEMPERATURE: 97.7 F

## 2020-08-28 PROCEDURE — A6212 FOAM DRG <=16 SQ IN W/BORDER: HCPCS

## 2020-08-28 PROCEDURE — A6215 FOAM DRESSING WOUND FILLER: HCPCS

## 2020-08-28 PROCEDURE — 3331090002 HH PPS REVENUE DEBIT

## 2020-08-28 PROCEDURE — 3331090001 HH PPS REVENUE CREDIT

## 2020-08-28 PROCEDURE — G0300 HHS/HOSPICE OF LPN EA 15 MIN: HCPCS

## 2020-08-28 PROCEDURE — A6251 ABSORPT DRG <=16 SQ IN W/O B: HCPCS

## 2020-08-29 PROCEDURE — 3331090001 HH PPS REVENUE CREDIT

## 2020-08-29 PROCEDURE — 3331090002 HH PPS REVENUE DEBIT

## 2020-08-30 PROCEDURE — 3331090002 HH PPS REVENUE DEBIT

## 2020-08-30 PROCEDURE — 3331090001 HH PPS REVENUE CREDIT

## 2020-08-31 ENCOUNTER — HOME CARE VISIT (OUTPATIENT)
Dept: SCHEDULING | Facility: HOME HEALTH | Age: 70
End: 2020-08-31
Payer: COMMERCIAL

## 2020-08-31 VITALS
SYSTOLIC BLOOD PRESSURE: 126 MMHG | TEMPERATURE: 97.9 F | OXYGEN SATURATION: 98 % | HEART RATE: 70 BPM | DIASTOLIC BLOOD PRESSURE: 70 MMHG | RESPIRATION RATE: 15 BRPM

## 2020-08-31 PROCEDURE — 3331090002 HH PPS REVENUE DEBIT

## 2020-08-31 PROCEDURE — G0300 HHS/HOSPICE OF LPN EA 15 MIN: HCPCS

## 2020-08-31 PROCEDURE — 3331090001 HH PPS REVENUE CREDIT

## 2020-09-01 ENCOUNTER — TELEPHONE (OUTPATIENT)
Dept: CARDIOLOGY CLINIC | Age: 70
End: 2020-09-01

## 2020-09-01 PROCEDURE — 3331090001 HH PPS REVENUE CREDIT

## 2020-09-01 PROCEDURE — 3331090002 HH PPS REVENUE DEBIT

## 2020-09-01 NOTE — TELEPHONE ENCOUNTER
Called and talked to the patient about her echocardiogram.  Echocardiogram appeared to demonstrate moderately severe left ventricular dysfunction with ejection fraction of 25 to 30% range which was may be slightly better than her last echo. She is to continue her Entresto at 97/103 twice a day level.  TAMAR

## 2020-09-02 ENCOUNTER — HOME CARE VISIT (OUTPATIENT)
Dept: SCHEDULING | Facility: HOME HEALTH | Age: 70
End: 2020-09-02
Payer: COMMERCIAL

## 2020-09-02 VITALS
OXYGEN SATURATION: 97 % | HEART RATE: 74 BPM | HEART RATE: 72 BPM | SYSTOLIC BLOOD PRESSURE: 126 MMHG | TEMPERATURE: 98.5 F | SYSTOLIC BLOOD PRESSURE: 134 MMHG | RESPIRATION RATE: 18 BRPM | DIASTOLIC BLOOD PRESSURE: 76 MMHG | DIASTOLIC BLOOD PRESSURE: 66 MMHG | OXYGEN SATURATION: 99 % | TEMPERATURE: 97.7 F | RESPIRATION RATE: 16 BRPM

## 2020-09-02 PROCEDURE — 3331090002 HH PPS REVENUE DEBIT

## 2020-09-02 PROCEDURE — 3331090001 HH PPS REVENUE CREDIT

## 2020-09-02 PROCEDURE — G0300 HHS/HOSPICE OF LPN EA 15 MIN: HCPCS

## 2020-09-03 ENCOUNTER — HOME CARE VISIT (OUTPATIENT)
Dept: HOME HEALTH SERVICES | Facility: HOME HEALTH | Age: 70
End: 2020-09-03
Payer: COMMERCIAL

## 2020-09-03 PROCEDURE — 3331090001 HH PPS REVENUE CREDIT

## 2020-09-03 PROCEDURE — 3331090002 HH PPS REVENUE DEBIT

## 2020-09-04 ENCOUNTER — HOME CARE VISIT (OUTPATIENT)
Dept: SCHEDULING | Facility: HOME HEALTH | Age: 70
End: 2020-09-04
Payer: COMMERCIAL

## 2020-09-04 PROCEDURE — 3331090002 HH PPS REVENUE DEBIT

## 2020-09-04 PROCEDURE — 3331090001 HH PPS REVENUE CREDIT

## 2020-09-04 PROCEDURE — G0300 HHS/HOSPICE OF LPN EA 15 MIN: HCPCS

## 2020-09-05 PROCEDURE — 3331090001 HH PPS REVENUE CREDIT

## 2020-09-05 PROCEDURE — 3331090002 HH PPS REVENUE DEBIT

## 2020-09-06 PROCEDURE — 3331090002 HH PPS REVENUE DEBIT

## 2020-09-06 PROCEDURE — 3331090001 HH PPS REVENUE CREDIT

## 2020-09-07 ENCOUNTER — HOME CARE VISIT (OUTPATIENT)
Dept: SCHEDULING | Facility: HOME HEALTH | Age: 70
End: 2020-09-07
Payer: COMMERCIAL

## 2020-09-07 VITALS
TEMPERATURE: 98.9 F | SYSTOLIC BLOOD PRESSURE: 120 MMHG | DIASTOLIC BLOOD PRESSURE: 62 MMHG | HEART RATE: 78 BPM | OXYGEN SATURATION: 99 %

## 2020-09-07 PROCEDURE — 3331090001 HH PPS REVENUE CREDIT

## 2020-09-07 PROCEDURE — 3331090002 HH PPS REVENUE DEBIT

## 2020-09-07 PROCEDURE — G0300 HHS/HOSPICE OF LPN EA 15 MIN: HCPCS

## 2020-09-08 ENCOUNTER — HOME CARE VISIT (OUTPATIENT)
Dept: HOME HEALTH SERVICES | Facility: HOME HEALTH | Age: 70
End: 2020-09-08
Payer: COMMERCIAL

## 2020-09-08 PROCEDURE — 3331090002 HH PPS REVENUE DEBIT

## 2020-09-08 PROCEDURE — 3331090001 HH PPS REVENUE CREDIT

## 2020-09-09 ENCOUNTER — HOSPITAL ENCOUNTER (OUTPATIENT)
Dept: LAB | Age: 70
Discharge: HOME | End: 2020-09-09
Payer: COMMERCIAL

## 2020-09-09 ENCOUNTER — HOME CARE VISIT (OUTPATIENT)
Dept: SCHEDULING | Facility: HOME HEALTH | Age: 70
End: 2020-09-09
Payer: COMMERCIAL

## 2020-09-09 PROCEDURE — A6215 FOAM DRESSING WOUND FILLER: HCPCS

## 2020-09-09 PROCEDURE — 87077 CULTURE AEROBIC IDENTIFY: CPT

## 2020-09-09 PROCEDURE — A6446 CONFORM BAND S W>=3" <5"/YD: HCPCS

## 2020-09-09 PROCEDURE — 87205 SMEAR GRAM STAIN: CPT

## 2020-09-09 PROCEDURE — A5120 SKIN BARRIER, WIPE OR SWAB: HCPCS

## 2020-09-09 PROCEDURE — A6212 FOAM DRG <=16 SQ IN W/BORDER: HCPCS

## 2020-09-09 PROCEDURE — 87186 SC STD MICRODIL/AGAR DIL: CPT

## 2020-09-09 PROCEDURE — A6216 NON-STERILE GAUZE<=16 SQ IN: HCPCS

## 2020-09-09 PROCEDURE — 3331090001 HH PPS REVENUE CREDIT

## 2020-09-09 PROCEDURE — 3331090002 HH PPS REVENUE DEBIT

## 2020-09-09 PROCEDURE — A6251 ABSORPT DRG <=16 SQ IN W/O B: HCPCS

## 2020-09-09 PROCEDURE — G0300 HHS/HOSPICE OF LPN EA 15 MIN: HCPCS

## 2020-09-10 PROCEDURE — 3331090002 HH PPS REVENUE DEBIT

## 2020-09-10 PROCEDURE — 3331090001 HH PPS REVENUE CREDIT

## 2020-09-11 ENCOUNTER — HOME CARE VISIT (OUTPATIENT)
Dept: SCHEDULING | Facility: HOME HEALTH | Age: 70
End: 2020-09-11
Payer: COMMERCIAL

## 2020-09-11 VITALS
RESPIRATION RATE: 17 BRPM | DIASTOLIC BLOOD PRESSURE: 66 MMHG | OXYGEN SATURATION: 100 % | HEART RATE: 72 BPM | RESPIRATION RATE: 15 BRPM | SYSTOLIC BLOOD PRESSURE: 126 MMHG | HEART RATE: 70 BPM | SYSTOLIC BLOOD PRESSURE: 124 MMHG | OXYGEN SATURATION: 98 % | TEMPERATURE: 97.3 F | TEMPERATURE: 97.6 F | DIASTOLIC BLOOD PRESSURE: 72 MMHG

## 2020-09-11 PROCEDURE — 3331090002 HH PPS REVENUE DEBIT

## 2020-09-11 PROCEDURE — 3331090001 HH PPS REVENUE CREDIT

## 2020-09-11 PROCEDURE — G0300 HHS/HOSPICE OF LPN EA 15 MIN: HCPCS

## 2020-09-12 PROCEDURE — 3331090001 HH PPS REVENUE CREDIT

## 2020-09-12 PROCEDURE — 3331090002 HH PPS REVENUE DEBIT

## 2020-09-13 PROCEDURE — 3331090002 HH PPS REVENUE DEBIT

## 2020-09-13 PROCEDURE — 3331090001 HH PPS REVENUE CREDIT

## 2020-09-14 ENCOUNTER — HOME CARE VISIT (OUTPATIENT)
Dept: SCHEDULING | Facility: HOME HEALTH | Age: 70
End: 2020-09-14
Payer: COMMERCIAL

## 2020-09-14 LAB
BACTERIA SPEC CULT: ABNORMAL
GRAM STN SPEC: ABNORMAL
SERVICE CMNT-IMP: ABNORMAL

## 2020-09-14 PROCEDURE — 3331090002 HH PPS REVENUE DEBIT

## 2020-09-14 PROCEDURE — G0300 HHS/HOSPICE OF LPN EA 15 MIN: HCPCS

## 2020-09-14 PROCEDURE — 400014 HH F/U

## 2020-09-14 PROCEDURE — 3331090001 HH PPS REVENUE CREDIT

## 2020-09-15 VITALS
HEART RATE: 74 BPM | TEMPERATURE: 97 F | OXYGEN SATURATION: 97 % | DIASTOLIC BLOOD PRESSURE: 70 MMHG | SYSTOLIC BLOOD PRESSURE: 134 MMHG | RESPIRATION RATE: 16 BRPM

## 2020-09-15 PROCEDURE — 3331090001 HH PPS REVENUE CREDIT

## 2020-09-15 PROCEDURE — 3331090002 HH PPS REVENUE DEBIT

## 2020-09-16 ENCOUNTER — HOSPITAL ENCOUNTER (OUTPATIENT)
Dept: VASCULAR SURGERY | Age: 70
Discharge: HOME | End: 2020-09-16
Attending: PODIATRIST
Payer: COMMERCIAL

## 2020-09-16 ENCOUNTER — HOME CARE VISIT (OUTPATIENT)
Dept: SCHEDULING | Facility: HOME HEALTH | Age: 70
End: 2020-09-16
Payer: COMMERCIAL

## 2020-09-16 DIAGNOSIS — I73.9 PERIPHERAL VASCULAR DISEASE, UNSPECIFIED (HCC): ICD-10-CM

## 2020-09-16 LAB
LEFT ABI: 1.27
LEFT ANTERIOR TIBIAL: 158 MMHG
LEFT ARM BP: 127 MMHG
LEFT CALF PRESSURE: 200 MMHG
LEFT POSTERIOR TIBIAL: 167 MMHG
RIGHT ABI: 1.19
RIGHT ANTERIOR TIBIAL: 153 MMHG
RIGHT ARM BP: 132 MMHG
RIGHT CALF PRESSURE: 203 MMHG
RIGHT POSTERIOR TIBIAL: 157 MMHG

## 2020-09-16 PROCEDURE — 3331090002 HH PPS REVENUE DEBIT

## 2020-09-16 PROCEDURE — 3331090001 HH PPS REVENUE CREDIT

## 2020-09-16 PROCEDURE — G0300 HHS/HOSPICE OF LPN EA 15 MIN: HCPCS

## 2020-09-16 PROCEDURE — 93923 UPR/LXTR ART STDY 3+ LVLS: CPT

## 2020-09-17 VITALS
OXYGEN SATURATION: 100 % | SYSTOLIC BLOOD PRESSURE: 138 MMHG | DIASTOLIC BLOOD PRESSURE: 74 MMHG | RESPIRATION RATE: 18 BRPM | HEART RATE: 75 BPM | TEMPERATURE: 97.2 F

## 2020-09-17 PROCEDURE — 3331090002 HH PPS REVENUE DEBIT

## 2020-09-17 PROCEDURE — 3331090001 HH PPS REVENUE CREDIT

## 2020-09-18 ENCOUNTER — HOME CARE VISIT (OUTPATIENT)
Dept: SCHEDULING | Facility: HOME HEALTH | Age: 70
End: 2020-09-18
Payer: COMMERCIAL

## 2020-09-18 PROCEDURE — 3331090001 HH PPS REVENUE CREDIT

## 2020-09-18 PROCEDURE — G0300 HHS/HOSPICE OF LPN EA 15 MIN: HCPCS

## 2020-09-18 PROCEDURE — 3331090002 HH PPS REVENUE DEBIT

## 2020-09-19 PROCEDURE — 3331090002 HH PPS REVENUE DEBIT

## 2020-09-19 PROCEDURE — 3331090001 HH PPS REVENUE CREDIT

## 2020-09-20 PROCEDURE — 3331090002 HH PPS REVENUE DEBIT

## 2020-09-20 PROCEDURE — 3331090001 HH PPS REVENUE CREDIT

## 2020-09-21 ENCOUNTER — HOME CARE VISIT (OUTPATIENT)
Dept: SCHEDULING | Facility: HOME HEALTH | Age: 70
End: 2020-09-21
Payer: COMMERCIAL

## 2020-09-21 VITALS
TEMPERATURE: 97.7 F | HEART RATE: 82 BPM | HEART RATE: 66 BPM | DIASTOLIC BLOOD PRESSURE: 78 MMHG | SYSTOLIC BLOOD PRESSURE: 118 MMHG | OXYGEN SATURATION: 97 % | OXYGEN SATURATION: 99 % | RESPIRATION RATE: 18 BRPM | DIASTOLIC BLOOD PRESSURE: 72 MMHG | TEMPERATURE: 97.9 F | SYSTOLIC BLOOD PRESSURE: 116 MMHG | RESPIRATION RATE: 16 BRPM

## 2020-09-21 PROCEDURE — G0300 HHS/HOSPICE OF LPN EA 15 MIN: HCPCS

## 2020-09-21 PROCEDURE — 3331090002 HH PPS REVENUE DEBIT

## 2020-09-21 PROCEDURE — 3331090001 HH PPS REVENUE CREDIT

## 2020-09-22 VITALS
TEMPERATURE: 97.6 F | RESPIRATION RATE: 15 BRPM | SYSTOLIC BLOOD PRESSURE: 128 MMHG | DIASTOLIC BLOOD PRESSURE: 72 MMHG | HEART RATE: 76 BPM | OXYGEN SATURATION: 98 %

## 2020-09-22 PROCEDURE — 3331090001 HH PPS REVENUE CREDIT

## 2020-09-22 PROCEDURE — 3331090002 HH PPS REVENUE DEBIT

## 2020-09-23 ENCOUNTER — HOME CARE VISIT (OUTPATIENT)
Dept: SCHEDULING | Facility: HOME HEALTH | Age: 70
End: 2020-09-23
Payer: COMMERCIAL

## 2020-09-23 VITALS
TEMPERATURE: 98.1 F | HEART RATE: 75 BPM | OXYGEN SATURATION: 95 % | DIASTOLIC BLOOD PRESSURE: 60 MMHG | SYSTOLIC BLOOD PRESSURE: 122 MMHG

## 2020-09-23 PROCEDURE — 3331090002 HH PPS REVENUE DEBIT

## 2020-09-23 PROCEDURE — G0299 HHS/HOSPICE OF RN EA 15 MIN: HCPCS

## 2020-09-23 PROCEDURE — 3331090001 HH PPS REVENUE CREDIT

## 2020-09-24 PROCEDURE — 3331090002 HH PPS REVENUE DEBIT

## 2020-09-24 PROCEDURE — 3331090001 HH PPS REVENUE CREDIT

## 2020-09-25 PROCEDURE — 3331090001 HH PPS REVENUE CREDIT

## 2020-09-25 PROCEDURE — 3331090002 HH PPS REVENUE DEBIT

## 2020-09-26 ENCOUNTER — HOME CARE VISIT (OUTPATIENT)
Dept: SCHEDULING | Facility: HOME HEALTH | Age: 70
End: 2020-09-26
Payer: COMMERCIAL

## 2020-09-26 PROCEDURE — 3331090001 HH PPS REVENUE CREDIT

## 2020-09-26 PROCEDURE — G0300 HHS/HOSPICE OF LPN EA 15 MIN: HCPCS

## 2020-09-26 PROCEDURE — 3331090002 HH PPS REVENUE DEBIT

## 2020-09-27 VITALS
TEMPERATURE: 97.8 F | OXYGEN SATURATION: 97 % | SYSTOLIC BLOOD PRESSURE: 134 MMHG | HEART RATE: 78 BPM | RESPIRATION RATE: 20 BRPM | DIASTOLIC BLOOD PRESSURE: 58 MMHG

## 2020-09-27 PROCEDURE — 3331090002 HH PPS REVENUE DEBIT

## 2020-09-27 PROCEDURE — 3331090001 HH PPS REVENUE CREDIT

## 2020-09-28 PROCEDURE — 3331090002 HH PPS REVENUE DEBIT

## 2020-09-28 PROCEDURE — 3331090001 HH PPS REVENUE CREDIT

## 2020-09-29 ENCOUNTER — HOME CARE VISIT (OUTPATIENT)
Dept: SCHEDULING | Facility: HOME HEALTH | Age: 70
End: 2020-09-29
Payer: COMMERCIAL

## 2020-09-29 PROCEDURE — 3331090001 HH PPS REVENUE CREDIT

## 2020-09-29 PROCEDURE — G0299 HHS/HOSPICE OF RN EA 15 MIN: HCPCS

## 2020-09-29 PROCEDURE — 3331090002 HH PPS REVENUE DEBIT

## 2020-09-30 ENCOUNTER — HOME CARE VISIT (OUTPATIENT)
Dept: SCHEDULING | Facility: HOME HEALTH | Age: 70
End: 2020-09-30
Payer: COMMERCIAL

## 2020-09-30 ENCOUNTER — TRANSCRIBE ORDER (OUTPATIENT)
Dept: SCHEDULING | Age: 70
End: 2020-09-30

## 2020-09-30 DIAGNOSIS — M86.9 OSTEOMYELITIS (HCC): ICD-10-CM

## 2020-09-30 DIAGNOSIS — L97.509 FOOT ULCER (HCC): Primary | ICD-10-CM

## 2020-09-30 PROCEDURE — 3331090002 HH PPS REVENUE DEBIT

## 2020-09-30 PROCEDURE — A4385 OST SKN BARRIER SLD EXT WEAR: HCPCS

## 2020-09-30 PROCEDURE — 3331090001 HH PPS REVENUE CREDIT

## 2020-09-30 PROCEDURE — G0300 HHS/HOSPICE OF LPN EA 15 MIN: HCPCS

## 2020-09-30 PROCEDURE — A4452 WATERPROOF TAPE: HCPCS

## 2020-10-01 VITALS
TEMPERATURE: 97.5 F | HEART RATE: 64 BPM | OXYGEN SATURATION: 99 % | DIASTOLIC BLOOD PRESSURE: 74 MMHG | RESPIRATION RATE: 18 BRPM | SYSTOLIC BLOOD PRESSURE: 126 MMHG

## 2020-10-01 PROCEDURE — 3331090002 HH PPS REVENUE DEBIT

## 2020-10-01 PROCEDURE — 3331090001 HH PPS REVENUE CREDIT

## 2020-10-02 ENCOUNTER — HOME CARE VISIT (OUTPATIENT)
Dept: SCHEDULING | Facility: HOME HEALTH | Age: 70
End: 2020-10-02
Payer: COMMERCIAL

## 2020-10-02 PROCEDURE — 3331090001 HH PPS REVENUE CREDIT

## 2020-10-02 PROCEDURE — 3331090002 HH PPS REVENUE DEBIT

## 2020-10-02 PROCEDURE — G0300 HHS/HOSPICE OF LPN EA 15 MIN: HCPCS

## 2020-10-03 VITALS
DIASTOLIC BLOOD PRESSURE: 76 MMHG | OXYGEN SATURATION: 100 % | HEART RATE: 71 BPM | TEMPERATURE: 97.4 F | RESPIRATION RATE: 20 BRPM | SYSTOLIC BLOOD PRESSURE: 110 MMHG

## 2020-10-03 PROCEDURE — 3331090002 HH PPS REVENUE DEBIT

## 2020-10-03 PROCEDURE — 3331090001 HH PPS REVENUE CREDIT

## 2020-10-04 PROCEDURE — 3331090001 HH PPS REVENUE CREDIT

## 2020-10-04 PROCEDURE — 3331090002 HH PPS REVENUE DEBIT

## 2020-10-05 ENCOUNTER — HOME CARE VISIT (OUTPATIENT)
Dept: SCHEDULING | Facility: HOME HEALTH | Age: 70
End: 2020-10-05
Payer: COMMERCIAL

## 2020-10-05 VITALS
RESPIRATION RATE: 17 BRPM | SYSTOLIC BLOOD PRESSURE: 136 MMHG | HEART RATE: 84 BPM | DIASTOLIC BLOOD PRESSURE: 70 MMHG | OXYGEN SATURATION: 99 % | TEMPERATURE: 97.9 F

## 2020-10-05 PROCEDURE — G0300 HHS/HOSPICE OF LPN EA 15 MIN: HCPCS

## 2020-10-05 PROCEDURE — 3331090002 HH PPS REVENUE DEBIT

## 2020-10-05 PROCEDURE — 3331090001 HH PPS REVENUE CREDIT

## 2020-10-06 PROCEDURE — 3331090002 HH PPS REVENUE DEBIT

## 2020-10-06 PROCEDURE — 3331090001 HH PPS REVENUE CREDIT

## 2020-10-07 ENCOUNTER — HOME CARE VISIT (OUTPATIENT)
Dept: SCHEDULING | Facility: HOME HEALTH | Age: 70
End: 2020-10-07
Payer: COMMERCIAL

## 2020-10-07 PROCEDURE — 3331090001 HH PPS REVENUE CREDIT

## 2020-10-07 PROCEDURE — G0300 HHS/HOSPICE OF LPN EA 15 MIN: HCPCS

## 2020-10-07 PROCEDURE — 3331090002 HH PPS REVENUE DEBIT

## 2020-10-08 ENCOUNTER — HOSPITAL ENCOUNTER (OUTPATIENT)
Age: 70
Discharge: HOME | End: 2020-10-08
Attending: INTERNAL MEDICINE

## 2020-10-08 ENCOUNTER — TRANSCRIBE ORDER (OUTPATIENT)
Dept: SCHEDULING | Age: 70
End: 2020-10-08

## 2020-10-08 DIAGNOSIS — L97.509 FOOT ULCER (HCC): ICD-10-CM

## 2020-10-08 DIAGNOSIS — M86.9 OSTEOMYELITIS (HCC): ICD-10-CM

## 2020-10-08 DIAGNOSIS — M86.9 OSTEOMYELITIS (HCC): Primary | ICD-10-CM

## 2020-10-08 PROCEDURE — A4385 OST SKN BARRIER SLD EXT WEAR: HCPCS

## 2020-10-08 PROCEDURE — 3331090002 HH PPS REVENUE DEBIT

## 2020-10-08 PROCEDURE — A4927 NON-STERILE GLOVES: HCPCS

## 2020-10-08 PROCEDURE — 3331090001 HH PPS REVENUE CREDIT

## 2020-10-09 ENCOUNTER — HOME CARE VISIT (OUTPATIENT)
Dept: SCHEDULING | Facility: HOME HEALTH | Age: 70
End: 2020-10-09
Payer: COMMERCIAL

## 2020-10-09 ENCOUNTER — TRANSCRIBE ORDER (OUTPATIENT)
Dept: SCHEDULING | Age: 70
End: 2020-10-09

## 2020-10-09 DIAGNOSIS — L97.509 FOOT ULCER (HCC): ICD-10-CM

## 2020-10-09 DIAGNOSIS — M86.9 OSTEOMYELITIS, LOWER LEG (HCC): Primary | ICD-10-CM

## 2020-10-09 PROCEDURE — 3331090002 HH PPS REVENUE DEBIT

## 2020-10-09 PROCEDURE — G0299 HHS/HOSPICE OF RN EA 15 MIN: HCPCS

## 2020-10-09 PROCEDURE — 3331090001 HH PPS REVENUE CREDIT

## 2020-10-10 PROCEDURE — 3331090001 HH PPS REVENUE CREDIT

## 2020-10-10 PROCEDURE — 3331090002 HH PPS REVENUE DEBIT

## 2020-10-11 PROCEDURE — 3331090001 HH PPS REVENUE CREDIT

## 2020-10-11 PROCEDURE — 3331090002 HH PPS REVENUE DEBIT

## 2020-10-12 ENCOUNTER — HOME CARE VISIT (OUTPATIENT)
Dept: SCHEDULING | Facility: HOME HEALTH | Age: 70
End: 2020-10-12
Payer: COMMERCIAL

## 2020-10-12 VITALS
DIASTOLIC BLOOD PRESSURE: 82 MMHG | HEART RATE: 78 BPM | RESPIRATION RATE: 17 BRPM | TEMPERATURE: 97.9 F | SYSTOLIC BLOOD PRESSURE: 116 MMHG | OXYGEN SATURATION: 99 %

## 2020-10-12 VITALS
RESPIRATION RATE: 18 BRPM | TEMPERATURE: 97.6 F | HEART RATE: 68 BPM | DIASTOLIC BLOOD PRESSURE: 80 MMHG | SYSTOLIC BLOOD PRESSURE: 136 MMHG | OXYGEN SATURATION: 98 %

## 2020-10-12 VITALS
HEART RATE: 76 BPM | SYSTOLIC BLOOD PRESSURE: 124 MMHG | DIASTOLIC BLOOD PRESSURE: 82 MMHG | OXYGEN SATURATION: 98 % | TEMPERATURE: 98.6 F | RESPIRATION RATE: 18 BRPM

## 2020-10-12 PROCEDURE — A6446 CONFORM BAND S W>=3" <5"/YD: HCPCS

## 2020-10-12 PROCEDURE — 3331090001 HH PPS REVENUE CREDIT

## 2020-10-12 PROCEDURE — G0300 HHS/HOSPICE OF LPN EA 15 MIN: HCPCS

## 2020-10-12 PROCEDURE — 3331090002 HH PPS REVENUE DEBIT

## 2020-10-12 PROCEDURE — A5120 SKIN BARRIER, WIPE OR SWAB: HCPCS

## 2020-10-13 PROCEDURE — 3331090002 HH PPS REVENUE DEBIT

## 2020-10-13 PROCEDURE — 3331090001 HH PPS REVENUE CREDIT

## 2020-10-14 ENCOUNTER — HOME CARE VISIT (OUTPATIENT)
Dept: SCHEDULING | Facility: HOME HEALTH | Age: 70
End: 2020-10-14
Payer: COMMERCIAL

## 2020-10-14 ENCOUNTER — HOSPITAL ENCOUNTER (OUTPATIENT)
Dept: CT IMAGING | Age: 70
Discharge: HOME | End: 2020-10-14
Attending: INTERNAL MEDICINE
Payer: COMMERCIAL

## 2020-10-14 DIAGNOSIS — L97.509 FOOT ULCER (HCC): ICD-10-CM

## 2020-10-14 DIAGNOSIS — M86.9 OSTEOMYELITIS, LOWER LEG (HCC): ICD-10-CM

## 2020-10-14 LAB — CREAT UR-MCNC: 1.6 MG/DL (ref 0.6–1.3)

## 2020-10-14 PROCEDURE — G0300 HHS/HOSPICE OF LPN EA 15 MIN: HCPCS

## 2020-10-14 PROCEDURE — 82565 ASSAY OF CREATININE: CPT

## 2020-10-14 PROCEDURE — 73701 CT LOWER EXTREMITY W/DYE: CPT

## 2020-10-14 PROCEDURE — 74011000636 HC RX REV CODE- 636: Performed by: INTERNAL MEDICINE

## 2020-10-14 PROCEDURE — 400014 HH F/U

## 2020-10-14 RX ADMIN — IOPAMIDOL 60 ML: 612 INJECTION, SOLUTION INTRAVENOUS at 16:26

## 2020-10-16 ENCOUNTER — HOME CARE VISIT (OUTPATIENT)
Dept: SCHEDULING | Facility: HOME HEALTH | Age: 70
End: 2020-10-16
Payer: COMMERCIAL

## 2020-10-16 PROCEDURE — G0300 HHS/HOSPICE OF LPN EA 15 MIN: HCPCS

## 2020-10-19 ENCOUNTER — HOME CARE VISIT (OUTPATIENT)
Dept: SCHEDULING | Facility: HOME HEALTH | Age: 70
End: 2020-10-19
Payer: COMMERCIAL

## 2020-10-19 PROCEDURE — G0300 HHS/HOSPICE OF LPN EA 15 MIN: HCPCS

## 2020-10-20 ENCOUNTER — OFFICE VISIT (OUTPATIENT)
Dept: VASCULAR SURGERY | Age: 70
End: 2020-10-20
Payer: MEDICARE

## 2020-10-20 VITALS
SYSTOLIC BLOOD PRESSURE: 150 MMHG | OXYGEN SATURATION: 97 % | BODY MASS INDEX: 37.98 KG/M2 | WEIGHT: 242 LBS | RESPIRATION RATE: 16 BRPM | HEIGHT: 67 IN | HEART RATE: 61 BPM | DIASTOLIC BLOOD PRESSURE: 82 MMHG

## 2020-10-20 DIAGNOSIS — E11.40 TYPE 2 DIABETES MELLITUS WITH DIABETIC NEUROPATHY, WITH LONG-TERM CURRENT USE OF INSULIN (HCC): ICD-10-CM

## 2020-10-20 DIAGNOSIS — L97.411 CHRONIC ULCER OF RIGHT HEEL LIMITED TO BREAKDOWN OF SKIN (HCC): ICD-10-CM

## 2020-10-20 DIAGNOSIS — M79.89 LEG SWELLING: ICD-10-CM

## 2020-10-20 DIAGNOSIS — I42.8 NONISCHEMIC CARDIOMYOPATHY (HCC): ICD-10-CM

## 2020-10-20 DIAGNOSIS — Z01.818 PREOP TESTING: ICD-10-CM

## 2020-10-20 DIAGNOSIS — I73.9 PERIPHERAL VASCULAR DISEASE, UNSPECIFIED (HCC): Primary | ICD-10-CM

## 2020-10-20 DIAGNOSIS — Z79.4 TYPE 2 DIABETES MELLITUS WITH DIABETIC NEUROPATHY, WITH LONG-TERM CURRENT USE OF INSULIN (HCC): ICD-10-CM

## 2020-10-20 DIAGNOSIS — Z86.718 HISTORY OF DEEP VEIN THROMBOSIS (DVT) OF LOWER EXTREMITY: ICD-10-CM

## 2020-10-20 PROCEDURE — G8427 DOCREV CUR MEDS BY ELIG CLIN: HCPCS | Performed by: PHYSICIAN ASSISTANT

## 2020-10-20 PROCEDURE — 2022F DILAT RTA XM EVC RTNOPTHY: CPT | Performed by: PHYSICIAN ASSISTANT

## 2020-10-20 PROCEDURE — 1090F PRES/ABSN URINE INCON ASSESS: CPT | Performed by: PHYSICIAN ASSISTANT

## 2020-10-20 PROCEDURE — G8400 PT W/DXA NO RESULTS DOC: HCPCS | Performed by: PHYSICIAN ASSISTANT

## 2020-10-20 PROCEDURE — G8432 DEP SCR NOT DOC, RNG: HCPCS | Performed by: PHYSICIAN ASSISTANT

## 2020-10-20 PROCEDURE — 1101F PT FALLS ASSESS-DOCD LE1/YR: CPT | Performed by: PHYSICIAN ASSISTANT

## 2020-10-20 PROCEDURE — 99204 OFFICE O/P NEW MOD 45 MIN: CPT | Performed by: PHYSICIAN ASSISTANT

## 2020-10-20 PROCEDURE — G8536 NO DOC ELDER MAL SCRN: HCPCS | Performed by: PHYSICIAN ASSISTANT

## 2020-10-20 PROCEDURE — 3046F HEMOGLOBIN A1C LEVEL >9.0%: CPT | Performed by: PHYSICIAN ASSISTANT

## 2020-10-20 PROCEDURE — 3017F COLORECTAL CA SCREEN DOC REV: CPT | Performed by: PHYSICIAN ASSISTANT

## 2020-10-20 PROCEDURE — G8417 CALC BMI ABV UP PARAM F/U: HCPCS | Performed by: PHYSICIAN ASSISTANT

## 2020-10-20 NOTE — PROGRESS NOTES
Luis Miguel Balderrama    Chief Complaint   Patient presents with    New Patient       HPI    Luis Miguel Balderrama is a 79 y.o. female previously known to our practice with history of bilateral DVT and IVC filter placed. She also has bilateral lower extremity edema with history of dilated nonischemic cardiomyopathy with EF 25-30% and follows with Dr Michelle Falcon for this issue. She is on diuretics. She has CKD with baseline creatinine of ~1.5. She presents to our office today at the request of her Podiatrist for PAD. She has a chronic nonhealing right heel ulcer. She was following with ID in the past for chronic wound infection and she has had multiple debridements and skin grafts in the past. Currently she has a wound vac in place. She does have Quincy Locke coming for ongoing wound care. Pictures of her wound were sent from her [id number] [address] [place] yesterday as dressing change was done at that time. She states that she has been dealing with this wound for about 2 years. She had a fall causing paraplegia and has not ambulated in about 2 years time. She states that during physical therapy she was transitioning to her wheelchair and hit her right heel hard on the ground which was the start of her wound. She is not complaining of any pain in the office today. She did have arterial studies done 9/16/20 which revealed the resting ankle-brachial index is in the normal range bilaterally. There is evidence of tibial artery disease by waveform analysis in the posterior tibial arteries bilaterally. No toe pressures were obtained. She also has history of diabetes with neuropathy. Not complaining of any fevers or chills. Otherwise she seems to be in her usual state of health. She remains very hopeful that she may heal her wound and walk again.       Past Medical History:   Diagnosis Date    Acute paraplegia ([address] 4/20/2017    Benign hypertensive heart disease with systolic CHF, NYHA class 2 ([address] 9/5/2012    Biventricular implantable cardioverter-defibrillator in situ 04/28/2005    Upgraded to BiV AICD; gen change 4/2008; pocket revision 10/2009; Abdominal - done on 8/22/2012 by Dr. Alf Newsome Cardiac cath 08/15/1996    Patent coronaries. Elev LVEDP. EF 50-55%.  Cardiac echocardiogram 06/23/2015    Ltd study. EF 45-50%. Mild, diffuse hypk. Severe apical hypk. No mass or thrombus was clearly identified, although imaging was suboptimal.      Cardiac nuclear imaging test 06/19/2015    Fixed distal apical, distal septal defect more likely due to RV pacing than prior infarct. No ischemia. EF 46%. RWMA c/w RV pacing. Nondiagnostic EKG on pharm stress test.      Cardiovascular lower extremity venous duplex 09/04/2012    Acute, non-occlusive DVT in CFV on right. No DVT on left. No superficial thrombosis bilaterally.  Cardiovascular upper extremity venous duplex 08/27/2012    DVT in axillary vein on left. Left subclavian was not visualized.     Chronic anemia 9/5/2012    Chronic systolic heart failure (HCC)     Decreased calculated glomerular filtration rate (GFR) 3/30/2017    Calculated GFR equivalent to that of CKD stage 3 = 30-59 ml/min    Diabetic neuropathy associated with type 2 diabetes mellitus ([address] 6/28/2011    Difficult airway for intubation 08/22/2012    see anesthesia airway note    Dyslipidemia 6/28/2011    Gout     History of complete heart block 06/28/2011    History of Coumadin therapy     Anticoagulation for DVT of the LUE; Discontinued on 3/30/2017    History of deep venous thrombosis 9/5/2012    Left upper extremity    History of pyelonephritis 3/30/2017    Left bundle branch block (LBBB) on electrocardiogram 06/28/2011    Nonischemic cardiomyopathy ([address] 6/28/2011    Obesity (BMI 35.0-39.9 without comorbidity) 3/13/2017    Obstructive sleep apnea on CPAP 2/7/2012    Psoas abscess, right ([address] 4/20/2017    Psoas hematoma, right, secondary to anticoagulant therapy 3/30/2017    Type 2 diabetes mellitus with diabetic neuropathy ([facility] 6/28/2011     Patient Active Problem List   Diagnosis Code    Nonischemic cardiomyopathy ([facility] I42.8    History of complete heart block Z86.79    Biventricular implantable cardioverter-defibrillator in situ Z95.810    Left bundle branch block (LBBB) on electrocardiogram I44.7    Type 2 diabetes mellitus with diabetic neuropathy ([facility]) E11.40    Dyslipidemia E78.5    Diabetic neuropathy associated with type 2 diabetes mellitus ([facility] E11.40    Obstructive sleep apnea on CPAP G47.33, Z99.89    AICD generator infection ([facility] T82. 7XXA    Difficult airway for intubation T88. 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 1XXA    Impaired mobility and ADLs Z74.09, Z78.9    History of Coumadin therapy Z92.29    Gout M10.9    Acute paraplegia ([facility]) G82.20    Sepsis ([facility]) A41.9    Psoas abscess, right ([facility] K68.12    Krueger catheter in place on admission Z97.8    Urinary tract infection due to Enterococcus N39.0, B95.2    Group B streptococcal infection A49.1    Hypervolemia E87.70    Anemia D64.9    Biventricular cardiac pacemaker in situ Z95.0    Neurogenic bladder N31.9    Type 2 diabetes with nephropathy ([facility]) E11.21    Obesity (BMI 30.0-34. 9) E66.9    Severe obesity (BMI 35.0-39. 9) with comorbidity ([facility] E66.01    Osteomyelitis ([facility] M86.9    Foot osteomyelitis, right ([facility] M86.9    Heel ulcer ([facility] L97.409    Infected wound T14. 8XXA, L08.9    Diabetic polyneuropathy associated with type 2 diabetes mellitus (HCC) E11.42    Non-healing wound of right heel S91.301A    Urinary retention R33.9    Dyspnea R06.00    Elevated troponin W70.2    Systolic CHF, acute on chronic (HCC) I50.23    Neuropathic ulcer of heel, right, with fat layer exposed ([address] L97.412    Wheelchair dependence Z99.3     Past Surgical History:   Procedure Laterality Date    HX CARPAL TUNNEL RELEASE  4/07    right     HX CHOLECYSTECTOMY  1994    HX HYSTERECTOMY  1973    HX OTHER SURGICAL  6/11/2012    AICD revision    HX PACEMAKER  4/28/2005    [employer] AICD    [place] REMVL PERM PM PLS GEN W/REPL PLSE GEN 2 LEAD SYS N/A 2/26/2019    REMOVE & REPLACE PPM GEN DUAL LEAD performed by Hayley Braga MD at [facility] CATH LAB     Current Outpatient Medications   Medication Sig Dispense Refill    acetaminophen (TYLENOL) 500 mg tablet Take 650 mg by mouth every eight (8) hours as needed for Pain.  levoFLOXacin (LEVAQUIN) 500 mg tablet Take 1 Tab by mouth daily. 7 Tab 0    diazePAM (Valium) 10 mg tablet Take 1 Tab by mouth every six (6) hours as needed for Anxiety. Max Daily Amount: 40 mg. 12 Tab 1    nitrofurantoin, macrocrystal-monohydrate, (MACROBID) 100 mg capsule Take 1 Cap by mouth two (2) times a day. 14 Cap 0    VITAMIN D3-VITAMIN K2, MK4, PO Take 1 Tab by mouth daily.  cyanocobalamin/folic acid (vitamin F00-MPTDW acid) 7,187-623 mcg lozg Take 2 Tabs by mouth daily.  ZINC GLUCONATE PO Take 25 mg by mouth daily.  IODINE PO Take 10 mg by mouth daily.  magnesium hydroxide (Camara Milk of Magnesia) 400 mg/5 mL suspension Take 5 mL by mouth as needed for Constipation.  furosemide (LASIX) 40 mg tablet Take 60 mg by mouth daily.  carvedilol (COREG) 6.25 mg tablet Take 1 Tab by mouth two (2) times a day. 180 Tab 3    sacubitril-valsartan (ENTRESTO) 97 mg/103 mg tablet Take 1 Tab by mouth two (2) times a day.  180 Tab 3    insulin glargine (LANTUS U-100 INSULIN) 100 unit/mL injection 10 Units by SubCUTAneous route nightly. Indications: type 2 diabetes mellitus 10 Vial 0    exenatide microspheres (BYDUREON) 2 mg serr 2 mg by SubCUTAneous route every seven (7) days.  multivitamin (ONE A DAY) tablet Take 1 Tab by mouth daily.  metFORMIN ER (GLUCOPHAGE XR) 500 mg tablet Take 500 mg by mouth two (2) times daily (with meals).  cholecalciferol, VITAMIN D3, (VITAMIN D3) 5,000 unit tab tablet Take 5,000 Units by mouth daily. take 1 tab daily of vitamin D3, 5000 units      gabapentin (NEURONTIN) 300 mg capsule Take 2 Caps by mouth two (2) times a day. Indications: NEUROPATHIC PAIN 100 Cap 0     Allergies   Allergen Reactions    Latex Itching    Vancomycin Itching    Ampicillin Itching    Bactrim [Sulfamethoxazole-Trimethoprim] Itching    Blueberry Swelling     Causes throat swelling    Ciprofloxacin Itching    Codeine Other (comments)     Jumpy feeling    Crestor [Rosuvastatin] Itching    Darvocet A500 [Propoxyphene N-Acetaminophen] Itching    Demerol [Meperidine] Itching    Levaquin [Levofloxacin] Itching    Lipitor [Atorvastatin] Myalgia    Magnesium Oxide Itching     nausea    Minocin [Minocycline] Itching    Pcn [Penicillins] Itching    Pravachol [Pravastatin] Swelling     Swelling in mouth.    Not allergic per patient, takes at home    Shellfish Derived Swelling     itching    Sulfa (Sulfonamide Antibiotics) Itching    Ultracet [Tramadol-Acetaminophen] Itching    Vicodin [Hydrocodone-Acetaminophen] Itching    Vytorin 10-10 [Ezetimibe-Simvastatin] Myalgia    Percodan [Oxycodone Hcl-Oxycodone-Asa] Itching     Social History     Socioeconomic History    Marital status:      Spouse name: Not on file    Number of children: Not on file    Years of education: Not on file    Highest education level: Not on file   Occupational History    Not on file   Social Needs    Financial resource strain: Not on file    Food insecurity     Worry: Not on file     Inability: Not on file   [employer] needs     Medical: Not on file     Non-medical: Not on file   Tobacco Use    Smoking status: Never Smoker    Smokeless tobacco: Never Used   Substance and Sexual Activity    Alcohol use: Not Currently    Drug use: No    Sexual activity: Not Currently     Partners: Male   Lifestyle    Physical activity     Days per week: Not on file     Minutes per session: Not on file    Stress: Not on file   Relationships    Social connections     Talks on phone: Not on file     Gets together: Not on file     Attends [employer] service: Not on file     Active member of club or organization: Not on file     Attends meetings of clubs or organizations: Not on file     Relationship status: Not on file    Intimate partner violence     Fear of current or ex partner: Not on file     Emotionally abused: Not on file     Physically abused: Not on file     Forced sexual activity: Not on file   Other Topics Concern    Not on file   Social History Narrative    Not on file      Family History   Problem Relation Age of Onset    Cancer Father         Leukemia       Review of Systems    Constitutional: negative  HEENT: negative   Respiratory: negative   Cardiovascular: negative   Gastrointestinal: negative   Genitourinary:negative   Hematologic/lymphatic: negative   Musculoskeletal: Positive for nonhealing right heel ulcer, bilateral lower extremity edema. Neurological: positive for BLE weakness, neuropathy, wheelchair bound  Behavioral/Psych: negative   Endocrine: negative   Allergic/Immunologic: negative      Physical Exam:    Visit Vitals  BP (!) 150/82 (BP 1 Location: Left arm, BP Patient Position: Sitting)   Pulse 61   Resp 16   Ht 5' 7\" (1.702 m)   Wt 242 lb (109.8 kg)   LMP  (LMP Unknown)   SpO2 97%   BMI 37.90 kg/m²      General: female in no acute distress . Patient is in a wheelchair. She is wearing a facemask. HEENT: EOMI, no scleral icterus is noted.  No carotid bruits are heard bilaterally Cardiovascular: Regular rhythm normal S1-S2 no rubs murmurs or gallops   Pulmonary: No increased work or breathing is noted. Clear to auscultation bilaterally. No wheeze, rales or rhonchi. Abdomen: obese, soft, nondistended. Extremities: Warm and well perfused bilaterally. She does have 3+ pitting edema in the left lower extremity. Mild edema on the right. Right foot dressing and wound VAC in place. Neuro: Cranial nerves II through XII are grossly intact. BLE weakness      Impression and Plan:  Myranda Shipley is a 79 y.o. female with chronic nonhealing right heel wound. She also has evidence of peripheral arterial disease with focal posterior tibial artery disease bilaterally. ABIs were within normal limits. I discussed that she likely has adequate perfusion to heal her wound but that we could consider intervention with angiogram to further assess and treat if needed. Due to her chronic kidney disease I did recommend possible CO2 angiogram, even though this is not the ideal procedure for tibial disease. He expresses understanding to this. Also due to her extensive cardiac history I would recommend obtaining cardiac clearance prior to procedure. Risk versus benefits of the procedure were discussed with her in the office today. I did also give her educational material in the office today. Patient feels she would like to move forward with the angiogram in efforts to try and heal her wound as she remains very hopeful to walk again 1 day. She is understanding that by performing the angiogram there may be no significant stenosis or obstruction treatable that would heal this wound. She is very realistic about her options. She would like to discuss angiogram with her daughter and  and will call to schedule once she has made her final decision. I discussed that we are available to answer any questions that she or her family may have in order to help with their decision making.   All questions were answered to the best of my ability. We will await her final decision and proceed accordingly. Plan was discussed. Patient expresses understanding and agrees. We reviewed the plan with the patient and the patient understands. We also gave the patient appropriate instructions on their disease process and when to call back. Greater than 50% of this visit was spent with face to face discussion. PLEASE NOTE:  This document has been produced using voice recognition software. Unrecognized errors in transcription may be present.

## 2020-10-21 ENCOUNTER — HOME CARE VISIT (OUTPATIENT)
Dept: SCHEDULING | Facility: HOME HEALTH | Age: 70
End: 2020-10-21
Payer: COMMERCIAL

## 2020-10-21 VITALS
TEMPERATURE: 97.5 F | SYSTOLIC BLOOD PRESSURE: 138 MMHG | OXYGEN SATURATION: 98 % | DIASTOLIC BLOOD PRESSURE: 72 MMHG | RESPIRATION RATE: 16 BRPM | HEART RATE: 84 BPM

## 2020-10-21 PROCEDURE — G0300 HHS/HOSPICE OF LPN EA 15 MIN: HCPCS

## 2020-10-22 PROCEDURE — A6216 NON-STERILE GAUZE<=16 SQ IN: HCPCS

## 2020-10-22 PROCEDURE — A4385 OST SKN BARRIER SLD EXT WEAR: HCPCS

## 2020-10-23 ENCOUNTER — HOME CARE VISIT (OUTPATIENT)
Dept: SCHEDULING | Facility: HOME HEALTH | Age: 70
End: 2020-10-23
Payer: COMMERCIAL

## 2020-10-23 PROCEDURE — G0300 HHS/HOSPICE OF LPN EA 15 MIN: HCPCS

## 2020-10-26 ENCOUNTER — HOME CARE VISIT (OUTPATIENT)
Dept: SCHEDULING | Facility: HOME HEALTH | Age: 70
End: 2020-10-26
Payer: COMMERCIAL

## 2020-10-26 PROCEDURE — G0300 HHS/HOSPICE OF LPN EA 15 MIN: HCPCS

## 2020-10-28 ENCOUNTER — HOME CARE VISIT (OUTPATIENT)
Dept: SCHEDULING | Facility: HOME HEALTH | Age: 70
End: 2020-10-28
Payer: COMMERCIAL

## 2020-10-28 VITALS
OXYGEN SATURATION: 100 % | HEART RATE: 76 BPM | RESPIRATION RATE: 17 BRPM | DIASTOLIC BLOOD PRESSURE: 72 MMHG | TEMPERATURE: 97 F | SYSTOLIC BLOOD PRESSURE: 124 MMHG

## 2020-10-30 ENCOUNTER — HOME CARE VISIT (OUTPATIENT)
Dept: SCHEDULING | Facility: HOME HEALTH | Age: 70
End: 2020-10-30
Payer: COMMERCIAL

## 2020-10-30 PROCEDURE — G0300 HHS/HOSPICE OF LPN EA 15 MIN: HCPCS

## 2020-11-02 ENCOUNTER — HOME CARE VISIT (OUTPATIENT)
Dept: SCHEDULING | Facility: HOME HEALTH | Age: 70
End: 2020-11-02
Payer: COMMERCIAL

## 2020-11-02 PROCEDURE — G0300 HHS/HOSPICE OF LPN EA 15 MIN: HCPCS

## 2020-11-03 PROCEDURE — A6260 WOUND CLEANSER ANY TYPE/SIZE: HCPCS

## 2020-11-03 PROCEDURE — A6212 FOAM DRG <=16 SQ IN W/BORDER: HCPCS

## 2020-11-03 PROCEDURE — A6446 CONFORM BAND S W>=3" <5"/YD: HCPCS

## 2020-11-03 PROCEDURE — A6209 FOAM DRSG <=16 SQ IN W/O BDR: HCPCS

## 2020-11-04 ENCOUNTER — HOME CARE VISIT (OUTPATIENT)
Dept: SCHEDULING | Facility: HOME HEALTH | Age: 70
End: 2020-11-04
Payer: COMMERCIAL

## 2020-11-04 VITALS
DIASTOLIC BLOOD PRESSURE: 64 MMHG | TEMPERATURE: 98.4 F | TEMPERATURE: 98.1 F | DIASTOLIC BLOOD PRESSURE: 76 MMHG | SYSTOLIC BLOOD PRESSURE: 122 MMHG | HEART RATE: 76 BPM | OXYGEN SATURATION: 97 % | RESPIRATION RATE: 15 BRPM | SYSTOLIC BLOOD PRESSURE: 136 MMHG | OXYGEN SATURATION: 98 % | RESPIRATION RATE: 18 BRPM | HEART RATE: 84 BPM

## 2020-11-04 VITALS
HEART RATE: 67 BPM | OXYGEN SATURATION: 97 % | RESPIRATION RATE: 18 BRPM | TEMPERATURE: 97.9 F | DIASTOLIC BLOOD PRESSURE: 80 MMHG | SYSTOLIC BLOOD PRESSURE: 120 MMHG

## 2020-11-04 PROCEDURE — G0299 HHS/HOSPICE OF RN EA 15 MIN: HCPCS

## 2020-11-06 ENCOUNTER — HOME CARE VISIT (OUTPATIENT)
Dept: SCHEDULING | Facility: HOME HEALTH | Age: 70
End: 2020-11-06
Payer: COMMERCIAL

## 2020-11-06 PROCEDURE — G0300 HHS/HOSPICE OF LPN EA 15 MIN: HCPCS

## 2020-11-06 PROCEDURE — A4452 WATERPROOF TAPE: HCPCS

## 2020-11-09 ENCOUNTER — HOME CARE VISIT (OUTPATIENT)
Dept: SCHEDULING | Facility: HOME HEALTH | Age: 70
End: 2020-11-09
Payer: COMMERCIAL

## 2020-11-09 VITALS
SYSTOLIC BLOOD PRESSURE: 132 MMHG | RESPIRATION RATE: 17 BRPM | OXYGEN SATURATION: 98 % | TEMPERATURE: 97.9 F | HEART RATE: 82 BPM | DIASTOLIC BLOOD PRESSURE: 75 MMHG

## 2020-11-09 PROCEDURE — G0300 HHS/HOSPICE OF LPN EA 15 MIN: HCPCS

## 2020-11-10 ENCOUNTER — OFFICE VISIT (OUTPATIENT)
Dept: CARDIOLOGY CLINIC | Age: 70
End: 2020-11-10
Payer: MEDICARE

## 2020-11-10 VITALS
OXYGEN SATURATION: 98 % | HEART RATE: 75 BPM | BODY MASS INDEX: 37.98 KG/M2 | WEIGHT: 242 LBS | DIASTOLIC BLOOD PRESSURE: 68 MMHG | SYSTOLIC BLOOD PRESSURE: 120 MMHG | HEIGHT: 67 IN

## 2020-11-10 DIAGNOSIS — I44.7 LEFT BUNDLE BRANCH BLOCK (LBBB) ON ELECTROCARDIOGRAM: ICD-10-CM

## 2020-11-10 DIAGNOSIS — R06.02 SOB (SHORTNESS OF BREATH): ICD-10-CM

## 2020-11-10 DIAGNOSIS — I11.0 HYPERTENSIVE HEART DISEASE WITH HEART FAILURE (HCC): ICD-10-CM

## 2020-11-10 DIAGNOSIS — I42.8 NONISCHEMIC CARDIOMYOPATHY (HCC): Primary | ICD-10-CM

## 2020-11-10 DIAGNOSIS — E78.5 DYSLIPIDEMIA: ICD-10-CM

## 2020-11-10 DIAGNOSIS — R60.9 EDEMA, UNSPECIFIED TYPE: ICD-10-CM

## 2020-11-10 DIAGNOSIS — G82.20 PARAPLEGIA (HCC): ICD-10-CM

## 2020-11-10 DIAGNOSIS — I50.22 CHRONIC SYSTOLIC HEART FAILURE (HCC): ICD-10-CM

## 2020-11-10 DIAGNOSIS — Z95.810 BIVENTRICULAR IMPLANTABLE CARDIOVERTER-DEFIBRILLATOR IN SITU: ICD-10-CM

## 2020-11-10 PROCEDURE — G8427 DOCREV CUR MEDS BY ELIG CLIN: HCPCS | Performed by: INTERNAL MEDICINE

## 2020-11-10 PROCEDURE — G8417 CALC BMI ABV UP PARAM F/U: HCPCS | Performed by: INTERNAL MEDICINE

## 2020-11-10 PROCEDURE — G8400 PT W/DXA NO RESULTS DOC: HCPCS | Performed by: INTERNAL MEDICINE

## 2020-11-10 PROCEDURE — 93000 ELECTROCARDIOGRAM COMPLETE: CPT | Performed by: INTERNAL MEDICINE

## 2020-11-10 PROCEDURE — G8536 NO DOC ELDER MAL SCRN: HCPCS | Performed by: INTERNAL MEDICINE

## 2020-11-10 PROCEDURE — G8432 DEP SCR NOT DOC, RNG: HCPCS | Performed by: INTERNAL MEDICINE

## 2020-11-10 PROCEDURE — 99214 OFFICE O/P EST MOD 30 MIN: CPT | Performed by: INTERNAL MEDICINE

## 2020-11-10 NOTE — PROGRESS NOTES
HPI:   I saw Radha Perry in my office in cardiovascular evaluation regarding her dilated nonischemic cardiomyopathy. Ms. Lam Perry is a very pleasant 70-year-old fair skinned -American female with history of a dilated cardiomyopathy with mild left ventricular dysfunction and an ejection fraction in the 40 to 45% range with widely patent coronary arteries and a cardiac catheterization back in August 1996. An echocardiogram completed on May 5, 2017 suggested an ejection fraction of 50%.  She did have a biventricular AICD placed but due to trauma and infection in that area she had that removed and now she has a biventricular pacemaker with pulse generator residing in her left upper quadrant placed back in October 2012.     Unfortunately, she fell at home and traumatized her back developing an infection of her right Psoas muscle hematoma with development of epidural abscess with neurologic compromise resulting in paraplegia back in April 2017.     She had her pacemaker generator changed by Dr. oRdrigue Coppola on February 26, 2019. Marcos Basurto should be noted that she had an infected right foot and was on antibiotics for for some time before the generator change and she is now off antibiotics and foot is now healing by secondary intention and apparently is doing well according to the patient.      She has had some problems with chronic heart failure for which we have tried to adjust her diuretics. Her last echocardiogram was completed on August 17, 2020 demonstrated a left ventricular ejection fraction of 25 to 30% which was slightly improved from past studies and suggested some benefit from her ongoing Entresto therapy. She does have stage III chronic kidney disease which makes more aggressive diuresis a little challenging. She comes in today and relates that she thinks that she is getting a little fluid overloaded. She has been more short of breath and thinks that her lower extremity edema is worse.      Encounter Diagnoses   Name Primary?  Nonischemic cardiomyopathy (HCC) Yes    Chronic systolic heart failure (HCC)     Left bundle branch block (LBBB) on electrocardiogram     Hypertensive heart disease with heart failure (HCC)     Paraplegia (HCC)     SOB (shortness of breath)     Edema, both legs     Dyslipidemia     Biventricular implantable cardioverter-defibrillator in situ        Discussion: This lady is difficult to manage related to the fact that we cannot really weigh her to get a better sense as to her overall volume status. I am going to get a BMP and a BNP on her and and will make further recommendations after reviewing those tests. She does have a Med[employer]onic biventricular ICD and this was checked and revealed no tachycardic events with biventricular pacing 100% of the time together with atrial sensing. There was 7.4 years remaining on the battery and OptiVol suggested normal fluid status. Her blood pressure is well controlled today and her overall heart rate seems to be stable in the mid 70's, so I am simply going to await her laboratory tests with further recommendations pending those results and her course. PCP: Zach Landrum MD        Past Medical History:   Diagnosis Date    Acute paraplegia ([facility] [address] [address] 4/20/2017    Benign hypertensive heart disease with systolic CHF, NYHA class 2 ([address] 9/5/2012    Biventricular implantable cardioverter-defibrillator in situ 04/28/2005    Upgraded to BiV AICD; gen change 4/2008; pocket revision 10/2009; Abdominal - done on 8/22/2012 by Dr. Bonifacio Langston Cardiac cath 08/15/1996    Patent coronaries. Elev LVEDP. EF 50-55%.  Cardiac echocardiogram 06/23/2015    Ltd study. EF 45-50%. Mild, diffuse hypk. Severe apical hypk. No mass or thrombus was clearly identified, although imaging was suboptimal.      Cardiac nuclear imaging test 06/19/2015    Fixed distal apical, distal septal defect more likely due to RV pacing than prior infarct.   No ischemia. EF 46%. RWMA c/w RV pacing. Nondiagnostic EKG on pharm stress test.      Cardiovascular lower extremity venous duplex 09/04/2012    Acute, non-occlusive DVT in CFV on right. No DVT on left. No superficial thrombosis bilaterally.  Cardiovascular upper extremity venous duplex 08/27/2012    DVT in axillary vein on left. Left subclavian was not visualized.     Chronic anemia 9/5/2012    Chronic systolic heart failure (HCC)     Decreased calculated glomerular filtration rate (GFR) 3/30/2017    Calculated GFR equivalent to that of CKD stage 3 = 30-59 ml/min    Diabetic neuropathy associated with type 2 diabetes mellitus ([address] 6/28/2011    Difficult airway for intubation 08/22/2012    see anesthesia airway note    Dyslipidemia 6/28/2011    Gout     History of complete heart block 06/28/2011    History of Coumadin therapy     Anticoagulation for DVT of the LUE; Discontinued on 3/30/2017    History of deep venous thrombosis 9/5/2012    Left upper extremity    History of pyelonephritis 3/30/2017    Left bundle branch block (LBBB) on electrocardiogram 06/28/2011    Nonischemic cardiomyopathy ([address] 6/28/2011    Obesity (BMI 35.0-39.9 without comorbidity) 3/13/2017    Obstructive sleep apnea on CPAP 2/7/2012    Psoas abscess, right ([address] 4/20/2017    Psoas hematoma, right, secondary to anticoagulant therapy 3/30/2017    Type 2 diabetes mellitus with diabetic neuropathy ([address] 6/28/2011         Past Surgical History:   Procedure Laterality Date    HX CARPAL TUNNEL RELEASE  4/07    right     HX CHOLECYSTECTOMY  1994    HX HYSTERECTOMY  1973    HX OTHER SURGICAL  6/11/2012    AICD revision    HX PACEMAKER  4/28/2005    [employer] AICD    NM REMVL PERM PM PLS GEN W/REPL PLSE GEN 2 LEAD SYS N/A 2/26/2019    REMOVE & REPLACE PPM GEN DUAL LEAD performed by Petr Quiroz MD at [facility] C[facility] LAB     Current Outpatient Medications   Medication Sig    levoFLOXacin (LEVAQUIN) 500 mg tablet Take 1 Tab by mouth daily.  diazePAM (Valium) 10 mg tablet Take 1 Tab by mouth every six (6) hours as needed for Anxiety. Max Daily Amount: 40 mg.    VITAMIN D3-VITAMIN K2, MK4, PO Take 1 Tab by mouth daily.  cyanocobalamin/folic acid (vitamin W46-PKZSN acid) 5,690-947 mcg lozg Take 2 Tabs by mouth daily.  ZINC GLUCONATE PO Take 25 mg by mouth daily.  IODINE PO Take 10 mg by mouth daily.  magnesium hydroxide (Camara Milk of Magnesia) 400 mg/5 mL suspension Take 5 mL by mouth as needed for Constipation.  furosemide (LASIX) 40 mg tablet Take 60 mg by mouth daily.  carvedilol (COREG) 6.25 mg tablet Take 1 Tab by mouth two (2) times a day.  sacubitril-valsartan (ENTRESTO) 97 mg/103 mg tablet Take 1 Tab by mouth two (2) times a day.  insulin glargine (LANTUS U-100 INSULIN) 100 unit/mL injection 10 Units by SubCUTAneous route nightly. Indications: type 2 diabetes mellitus    exenatide microspheres (BYDUREON) 2 mg serr 2 mg by SubCUTAneous route every seven (7) days.  multivitamin (ONE A DAY) tablet Take 1 Tab by mouth daily.  metFORMIN ER (GLUCOPHAGE XR) 500 mg tablet Take 500 mg by mouth two (2) times daily (with meals).  cholecalciferol, VITAMIN D3, (VITAMIN D3) 5,000 unit tab tablet Take 5,000 Units by mouth daily. take 1 tab daily of vitamin D3, 5000 units    gabapentin (NEURONTIN) 300 mg capsule Take 2 Caps by mouth two (2) times a day. Indications: NEUROPATHIC PAIN     No current facility-administered medications for this visit.           Allergies   Allergen Reactions    Latex Itching    Vancomycin Itching    Ampicillin Itching    Bactrim [Sulfamethoxazole-Trimethoprim] Itching    Blueberry Swelling     Causes throat swelling    Ciprofloxacin Itching    Codeine Other (comments)     Jumpy feeling    Crestor [Rosuvastatin] Itching    Darvocet A500 [Propoxyphene N-Acetaminophen] Itching    Demerol [Meperidine] Itching    Levaquin [Levofloxacin] Itching    Lipitor [Atorvastatin] Myalgia    Magnesium Oxide Itching     nausea    Minocin [Minocycline] Itching    Pcn [Penicillins] Itching    Pravachol [Pravastatin] Swelling     Swelling in mouth. Not allergic per patient, takes at home    Shellfish Derived Swelling     itching    Sulfa (Sulfonamide Antibiotics) Itching    Ultracet [Tramadol-Acetaminophen] Itching    Vicodin [Hydrocodone-Acetaminophen] Itching    Vytorin 10-10 [Ezetimibe-Simvastatin] Myalgia    Percodan [Oxycodone Hcl-Oxycodone-Asa] Itching         Social   Social History     Tobacco Use    Smoking status: Never Smoker    Smokeless tobacco: Never Used   Substance Use Topics    Alcohol use: Not Currently         Family history: family history includes Cancer in her father. Review of Systems:     Constitutional: Negative. Respiratory: Negative. Cardiovascular: Positive for leg swelling. Negative for chest pain, palpitations and orthopnea. Gastrointestinal: Negative. Musculoskeletal: Positive for back pain, joint pain and neck pain. Negative for falls. Neurological: Negative for dizziness. Physical Exam:   The patient is an alert, oriented, well developed, well nourished 79 y.o.  female who was in no acute distress at the time of my examination. Visit Vitals  /68 (BP 1 Location: Left arm, BP Patient Position: Sitting)   Pulse 75   Ht 5' 7\" (1.702 m)   Wt 109.8 kg (242 lb)   SpO2 98%   BMI 37.90 kg/m²      BP Readings from Last 3 Encounters:   11/16/20 116/64   11/14/20 130/74   11/11/20 136/70        Wt Readings from Last 3 Encounters:   11/10/20 109.8 kg (242 lb)   10/20/20 109.8 kg (242 lb)   08/17/20 109.8 kg (242 lb)       HEENT: Conjunctivae pink, sclera clear and white. Neck: Supple without masses or tenderness. There is mild thyromegaly. No clear jugular venous distention. Carotid upstrokes are full bilaterally, without bruits. Cardiovascular: Chest is symmetrical with good excursion.   There keloid over the incision in left upper chest in former pacemaker site. Mayflower is displaced between the MCL and AAL. No lifts, heaves, or thrills felt on palpation. Normal S1 and S2 with a paradoxical splitting of the S2, with a grade II/VI DEE along the left sternal border, with radiation to the base without diastolic murmur. Lungs: Clear to ausculation. Abdomen: Protuberant and soft non tender. It was not adequately evaluated since the patient was in a wheelchair at the time of evaluation. Extremities: 1-2 + edema on the left leg and 1 + edema on the right leg. Neurologic exam: was not completed but the patient is a paraplegic. Review of Data: Please refer to past medical history for most recent cardiac testing. Results for orders placed or performed in visit on 01/07/20   AMB POC EKG ROUTINE W/ 12 LEADS, INTER & REP     Status: None    Narrative    Read by Viji Dorantes DO. Atrially sensed and ventricularly paced rhythm with heart rate 88. Viji Dorantes D.O., F.A.C.C. [facility]  [facility]  [address] [place] [address]. [address]    PLEASE NOTE:  This document has been produced using voice recognition software. Unrecognized errors in transcription may be present.

## 2020-11-10 NOTE — PROGRESS NOTES
Sheron Lancaster presents today for No chief complaint on file. Sheron Lancaster preferred language for health care discussion is english/other. Is someone accompanying this pt? no    Is the patient using any DME equipment during [address]? no    Depression Screening:  3 most recent PHQ Screens 10/20/2020   Little interest or pleasure in doing things Not at all   Feeling down, depressed, irritable, or hopeless Not at all   Total Score PHQ 2 0       Learning Assessment:  Learning Assessment 10/20/2020   PRIMARY LEARNER Patient   HIGHEST LEVEL OF EDUCATION - PRIMARY LEARNER  -   BARRIERS PRIMARY LEARNER -   CO-LEARNER CAREGIVER -   PRIMARY LANGUAGE ENGLISH   LEARNER PREFERENCE PRIMARY LISTENING   ANSWERED BY patient   RELATIONSHIP SELF       Abuse Screening:  Abuse Screening Questionnaire 5/8/2019   Do you ever feel afraid of your partner? N   Are you in a relationship with someone who physically or mentally threatens you? N   Is it safe for you to go home? Y       Fall Risk  Fall Risk Assessment, last 12 mths 10/20/2020   Able to walk? No   Fall in past 12 months? -       Pt currently taking Anticoagulant therapy? no    Coordination of Care:  1. Have you been to the ER, urgent care clinic since your last visit? Hospitalized since your last visit? no    2. Have you seen or consulted any other health care providers outside of the [address] [place] [address] since your last visit? Include any pap smears or colon screening.  no

## 2020-11-10 NOTE — PROGRESS NOTES
Review of Systems Constitutional: Negative. Respiratory: Negative. Cardiovascular: Positive for leg swelling. Negative for chest pain, palpitations and orthopnea. Gastrointestinal: Negative. Musculoskeletal: Positive for back pain, joint pain and neck pain. Negative for falls. Neurological: Negative for dizziness.

## 2020-11-11 ENCOUNTER — HOME CARE VISIT (OUTPATIENT)
Dept: SCHEDULING | Facility: HOME HEALTH | Age: 70
End: 2020-11-11
Payer: COMMERCIAL

## 2020-11-11 PROCEDURE — G0300 HHS/HOSPICE OF LPN EA 15 MIN: HCPCS

## 2020-11-12 PROCEDURE — A4385 OST SKN BARRIER SLD EXT WEAR: HCPCS

## 2020-11-12 PROCEDURE — A6212 FOAM DRG <=16 SQ IN W/BORDER: HCPCS

## 2020-11-13 VITALS
SYSTOLIC BLOOD PRESSURE: 136 MMHG | RESPIRATION RATE: 15 BRPM | HEART RATE: 80 BPM | OXYGEN SATURATION: 98 % | DIASTOLIC BLOOD PRESSURE: 70 MMHG | TEMPERATURE: 97.5 F

## 2020-11-14 ENCOUNTER — HOME CARE VISIT (OUTPATIENT)
Dept: SCHEDULING | Facility: HOME HEALTH | Age: 70
End: 2020-11-14
Payer: COMMERCIAL

## 2020-11-14 PROCEDURE — G0300 HHS/HOSPICE OF LPN EA 15 MIN: HCPCS

## 2020-11-14 PROCEDURE — 400014 HH F/U

## 2020-11-15 VITALS
OXYGEN SATURATION: 99 % | TEMPERATURE: 98.7 F | HEART RATE: 87 BPM | SYSTOLIC BLOOD PRESSURE: 130 MMHG | DIASTOLIC BLOOD PRESSURE: 74 MMHG

## 2020-11-16 ENCOUNTER — HOME CARE VISIT (OUTPATIENT)
Dept: SCHEDULING | Facility: HOME HEALTH | Age: 70
End: 2020-11-16
Payer: COMMERCIAL

## 2020-11-16 VITALS
SYSTOLIC BLOOD PRESSURE: 134 MMHG | DIASTOLIC BLOOD PRESSURE: 80 MMHG | RESPIRATION RATE: 17 BRPM | HEART RATE: 80 BPM | TEMPERATURE: 98.3 F | OXYGEN SATURATION: 99 %

## 2020-11-16 PROCEDURE — G0300 HHS/HOSPICE OF LPN EA 15 MIN: HCPCS

## 2020-11-17 VITALS
HEART RATE: 74 BPM | RESPIRATION RATE: 17 BRPM | DIASTOLIC BLOOD PRESSURE: 64 MMHG | TEMPERATURE: 98.3 F | OXYGEN SATURATION: 98 % | SYSTOLIC BLOOD PRESSURE: 116 MMHG

## 2020-11-18 ENCOUNTER — HOME CARE VISIT (OUTPATIENT)
Dept: SCHEDULING | Facility: HOME HEALTH | Age: 70
End: 2020-11-18
Payer: COMMERCIAL

## 2020-11-18 PROCEDURE — G0300 HHS/HOSPICE OF LPN EA 15 MIN: HCPCS

## 2020-11-20 ENCOUNTER — HOME CARE VISIT (OUTPATIENT)
Dept: HOME HEALTH SERVICES | Facility: HOME HEALTH | Age: 70
End: 2020-11-20
Payer: COMMERCIAL

## 2020-11-20 ENCOUNTER — HOME CARE VISIT (OUTPATIENT)
Dept: SCHEDULING | Facility: HOME HEALTH | Age: 70
End: 2020-11-20
Payer: COMMERCIAL

## 2020-11-20 PROCEDURE — G0300 HHS/HOSPICE OF LPN EA 15 MIN: HCPCS

## 2020-11-21 ENCOUNTER — HOSPITAL ENCOUNTER (OUTPATIENT)
Dept: LAB | Age: 70
Discharge: HOME | End: 2020-11-21
Payer: COMMERCIAL

## 2020-11-21 ENCOUNTER — HOME CARE VISIT (OUTPATIENT)
Dept: SCHEDULING | Facility: HOME HEALTH | Age: 70
End: 2020-11-21
Payer: COMMERCIAL

## 2020-11-21 VITALS
OXYGEN SATURATION: 97 % | TEMPERATURE: 97.9 F | RESPIRATION RATE: 16 BRPM | SYSTOLIC BLOOD PRESSURE: 130 MMHG | DIASTOLIC BLOOD PRESSURE: 78 MMHG | HEART RATE: 76 BPM

## 2020-11-21 VITALS
DIASTOLIC BLOOD PRESSURE: 80 MMHG | RESPIRATION RATE: 20 BRPM | HEART RATE: 78 BPM | OXYGEN SATURATION: 98 % | SYSTOLIC BLOOD PRESSURE: 146 MMHG | TEMPERATURE: 98.7 F

## 2020-11-21 LAB
ANION GAP SERPL CALC-SCNC: 6 MMOL/L (ref 3–18)
BNP SERPL-MCNC: 885 PG/ML (ref 0–900)
BUN SERPL-MCNC: 34 MG/DL (ref 7–18)
BUN/CREAT SERPL: 20 (ref 12–20)
CALCIUM SERPL-MCNC: 9.6 MG/DL (ref 8.5–10.1)
CHLORIDE SERPL-SCNC: 106 MMOL/L (ref 100–111)
CO2 SERPL-SCNC: 28 MMOL/L (ref 21–32)
CREAT SERPL-MCNC: 1.66 MG/DL (ref 0.6–1.3)
GLUCOSE SERPL-MCNC: 106 MG/DL (ref 74–99)
POTASSIUM SERPL-SCNC: 4 MMOL/L (ref 3.5–5.5)
SODIUM SERPL-SCNC: 140 MMOL/L (ref 136–145)

## 2020-11-21 PROCEDURE — 83880 ASSAY OF NATRIURETIC PEPTIDE: CPT

## 2020-11-21 PROCEDURE — G0299 HHS/HOSPICE OF RN EA 15 MIN: HCPCS

## 2020-11-21 PROCEDURE — 80048 BASIC METABOLIC PNL TOTAL CA: CPT

## 2020-11-23 VITALS
DIASTOLIC BLOOD PRESSURE: 78 MMHG | OXYGEN SATURATION: 99 % | HEART RATE: 82 BPM | RESPIRATION RATE: 16 BRPM | SYSTOLIC BLOOD PRESSURE: 136 MMHG | TEMPERATURE: 97.7 F

## 2020-11-25 NOTE — PATIENT INSTRUCTIONS
[facility] Patient  EP Instructions 1. You are scheduled to have a BIVI PACEMAKER GENERATOR CHANGE ON ________ AT _________, ARRIVE AT__________. 2. Please go to [facility] and park in the outpatient parking lot that is located around to the back of the hospital and enter through the [facility] building. Once you enter through the [facility] check in with the  there. The  will either give you directions or assist you in getting to the cath holding area. 3.  You are not to eat or drink anything after midnight the night before your  procedure. 4. Please continue to take your medications with a small sip. HOLD LASIX MORNING OF PROCEDURE. Take Prednisone 60 mg and Benadryl 25 mg by mouth at Bedtime the night before procedure and again 1 hour prior to procedure. DO NOT drive after taking the Benadryl. This is to prevent you from having an allergic reaction to the dye. 
 
5. If you are diabetic, do not take your insulin/sugar pill the morning of the procedure. 6. We encourage families to wait in the waiting room on the first floor while the procedure is being done. The Doctor will come out and talk with you as soon as the procedure is over. 7. There is the possibility that you may spend the night in the hospital, depending on the results of the procedure. This will be determined after the procedure is done. 8.   If you or your family have any questions, please call our office Monday-Friday 9:00am  
      -4:30 pm , at [phone], and ask to speak to one of the nurses. low salt low cholesterol diet

## 2020-11-28 ENCOUNTER — HOME CARE VISIT (OUTPATIENT)
Dept: SCHEDULING | Facility: HOME HEALTH | Age: 70
End: 2020-11-28
Payer: COMMERCIAL

## 2020-11-28 PROCEDURE — G0300 HHS/HOSPICE OF LPN EA 15 MIN: HCPCS

## 2020-11-28 PROCEDURE — A6212 FOAM DRG <=16 SQ IN W/BORDER: HCPCS

## 2020-11-30 VITALS
OXYGEN SATURATION: 100 % | HEART RATE: 68 BPM | DIASTOLIC BLOOD PRESSURE: 78 MMHG | RESPIRATION RATE: 16 BRPM | TEMPERATURE: 97.3 F | SYSTOLIC BLOOD PRESSURE: 136 MMHG

## 2020-12-01 ENCOUNTER — HOME CARE VISIT (OUTPATIENT)
Dept: SCHEDULING | Facility: HOME HEALTH | Age: 70
End: 2020-12-01
Payer: COMMERCIAL

## 2020-12-01 PROCEDURE — G0300 HHS/HOSPICE OF LPN EA 15 MIN: HCPCS

## 2020-12-03 ENCOUNTER — HOME CARE VISIT (OUTPATIENT)
Dept: SCHEDULING | Facility: HOME HEALTH | Age: 70
End: 2020-12-03
Payer: COMMERCIAL

## 2020-12-03 VITALS
TEMPERATURE: 97.3 F | HEART RATE: 70 BPM | RESPIRATION RATE: 17 BRPM | DIASTOLIC BLOOD PRESSURE: 72 MMHG | SYSTOLIC BLOOD PRESSURE: 118 MMHG | OXYGEN SATURATION: 97 %

## 2020-12-03 PROCEDURE — G0300 HHS/HOSPICE OF LPN EA 15 MIN: HCPCS

## 2020-12-05 VITALS
OXYGEN SATURATION: 100 % | SYSTOLIC BLOOD PRESSURE: 126 MMHG | DIASTOLIC BLOOD PRESSURE: 68 MMHG | RESPIRATION RATE: 18 BRPM | TEMPERATURE: 97.3 F | HEART RATE: 72 BPM

## 2020-12-08 ENCOUNTER — HOME CARE VISIT (OUTPATIENT)
Dept: SCHEDULING | Facility: HOME HEALTH | Age: 70
End: 2020-12-08
Payer: COMMERCIAL

## 2020-12-08 PROCEDURE — G0300 HHS/HOSPICE OF LPN EA 15 MIN: HCPCS

## 2020-12-09 VITALS
OXYGEN SATURATION: 98 % | SYSTOLIC BLOOD PRESSURE: 128 MMHG | TEMPERATURE: 97.1 F | HEART RATE: 70 BPM | DIASTOLIC BLOOD PRESSURE: 64 MMHG | RESPIRATION RATE: 18 BRPM

## 2020-12-11 ENCOUNTER — HOME CARE VISIT (OUTPATIENT)
Dept: HOME HEALTH SERVICES | Facility: HOME HEALTH | Age: 70
End: 2020-12-11
Payer: COMMERCIAL

## 2020-12-12 ENCOUNTER — HOME CARE VISIT (OUTPATIENT)
Dept: HOME HEALTH SERVICES | Facility: HOME HEALTH | Age: 70
End: 2020-12-12
Payer: COMMERCIAL

## 2020-12-12 ENCOUNTER — HOME CARE VISIT (OUTPATIENT)
Dept: SCHEDULING | Facility: HOME HEALTH | Age: 70
End: 2020-12-12
Payer: COMMERCIAL

## 2020-12-12 VITALS
SYSTOLIC BLOOD PRESSURE: 118 MMHG | RESPIRATION RATE: 16 BRPM | HEART RATE: 78 BPM | OXYGEN SATURATION: 98 % | DIASTOLIC BLOOD PRESSURE: 68 MMHG | TEMPERATURE: 97.6 F

## 2020-12-12 PROCEDURE — G0299 HHS/HOSPICE OF RN EA 15 MIN: HCPCS

## 2020-12-14 VITALS
OXYGEN SATURATION: 98 % | HEART RATE: 78 BPM | RESPIRATION RATE: 16 BRPM | DIASTOLIC BLOOD PRESSURE: 68 MMHG | SYSTOLIC BLOOD PRESSURE: 118 MMHG | TEMPERATURE: 97.6 F

## 2020-12-17 RX ORDER — FUROSEMIDE 40 MG/1
TABLET ORAL
Qty: 90 TAB | Refills: 3 | Status: SHIPPED | OUTPATIENT
Start: 2020-12-17 | End: 2021-02-01

## 2020-12-17 RX ORDER — CARVEDILOL 6.25 MG/1
TABLET ORAL
Qty: 180 TAB | Refills: 3 | Status: SHIPPED | OUTPATIENT
Start: 2020-12-17 | End: 2021-11-01

## 2020-12-24 RX ORDER — SACUBITRIL AND VALSARTAN 97; 103 MG/1; MG/1
TABLET, FILM COATED ORAL
Qty: 180 TAB | Refills: 3 | Status: SHIPPED | OUTPATIENT
Start: 2020-12-24 | End: 2022-01-04

## 2021-01-12 ENCOUNTER — DOCUMENTATION ONLY (OUTPATIENT)
Dept: VASCULAR SURGERY | Age: 71
End: 2021-01-12

## 2021-02-01 RX ORDER — FUROSEMIDE 40 MG/1
TABLET ORAL
Qty: 135 TAB | Refills: 3 | Status: SHIPPED | OUTPATIENT
Start: 2021-02-01 | End: 2022-03-08

## 2021-02-11 ENCOUNTER — OFFICE VISIT (OUTPATIENT)
Dept: CARDIOLOGY CLINIC | Age: 71
End: 2021-02-11
Payer: MEDICARE

## 2021-02-11 VITALS
OXYGEN SATURATION: 98 % | HEIGHT: 67 IN | DIASTOLIC BLOOD PRESSURE: 64 MMHG | HEART RATE: 72 BPM | SYSTOLIC BLOOD PRESSURE: 120 MMHG | BODY MASS INDEX: 37.9 KG/M2

## 2021-02-11 DIAGNOSIS — R06.02 SOB (SHORTNESS OF BREATH): Primary | ICD-10-CM

## 2021-02-11 DIAGNOSIS — I42.8 NONISCHEMIC CARDIOMYOPATHY (HCC): Chronic | ICD-10-CM

## 2021-02-11 DIAGNOSIS — R00.2 PALPITATIONS: ICD-10-CM

## 2021-02-11 PROCEDURE — G8432 DEP SCR NOT DOC, RNG: HCPCS | Performed by: INTERNAL MEDICINE

## 2021-02-11 PROCEDURE — G8427 DOCREV CUR MEDS BY ELIG CLIN: HCPCS | Performed by: INTERNAL MEDICINE

## 2021-02-11 PROCEDURE — 99214 OFFICE O/P EST MOD 30 MIN: CPT | Performed by: INTERNAL MEDICINE

## 2021-02-11 PROCEDURE — 3017F COLORECTAL CA SCREEN DOC REV: CPT | Performed by: INTERNAL MEDICINE

## 2021-02-11 PROCEDURE — G8400 PT W/DXA NO RESULTS DOC: HCPCS | Performed by: INTERNAL MEDICINE

## 2021-02-11 PROCEDURE — G8417 CALC BMI ABV UP PARAM F/U: HCPCS | Performed by: INTERNAL MEDICINE

## 2021-02-11 PROCEDURE — 1090F PRES/ABSN URINE INCON ASSESS: CPT | Performed by: INTERNAL MEDICINE

## 2021-02-11 PROCEDURE — G8536 NO DOC ELDER MAL SCRN: HCPCS | Performed by: INTERNAL MEDICINE

## 2021-02-11 PROCEDURE — 1101F PT FALLS ASSESS-DOCD LE1/YR: CPT | Performed by: INTERNAL MEDICINE

## 2021-02-11 RX ORDER — METOLAZONE 2.5 MG/1
TABLET ORAL
Qty: 10 TAB | Refills: 0 | Status: SHIPPED | OUTPATIENT
Start: 2021-02-11 | End: 2021-09-09

## 2021-02-11 NOTE — PROGRESS NOTES
Jody Zaragoza presents today for   Chief Complaint   Patient presents with    Follow-up     3 month follow up        Ronnykhushboo Zaragoza preferred language for health care discussion is english/other. Is someone accompanying this pt? no    Is the patient using any DME equipment during [address]? no    Depression Screening:  3 most recent PHQ Screens 10/20/2020   Little interest or pleasure in doing things Not at all   Feeling down, depressed, irritable, or hopeless Not at all   Total Score PHQ 2 0       Learning Assessment:  Learning Assessment 10/20/2020   PRIMARY LEARNER Patient   HIGHEST LEVEL OF EDUCATION - PRIMARY LEARNER  -   BARRIERS PRIMARY LEARNER -   CO-LEARNER CAREGIVER -   PRIMARY LANGUAGE ENGLISH   LEARNER PREFERENCE PRIMARY LISTENING   ANSWERED BY patient   RELATIONSHIP SELF       Abuse Screening:  Abuse Screening Questionnaire 5/8/2019   Do you ever feel afraid of your partner? N   Are you in a relationship with someone who physically or mentally threatens you? N   Is it safe for you to go home? Y       Fall Risk  Fall Risk Assessment, last 12 mths 10/20/2020   Able to walk? No   Fall in past 12 months? -       Pt currently taking Anticoagulant therapy? no    Coordination of Care:  1. Have you been to the ER, urgent care clinic since your last visit? Hospitalized since your last visit? no    2. Have you seen or consulted any other health care providers outside of the [address] [place] [address] since your last visit? Include any pap smears or colon screening.  no

## 2021-02-11 NOTE — PATIENT INSTRUCTIONS
Start Metolazone 2.5mg Take 3 times a week for 1 week, then 2 times a week for second week, then once weekly thereafter. Cmp, bnp (blood work ) Follow up Bg Rubio 4-6 weeks Follow up Dr Rishabh Cortes 6 months

## 2021-02-11 NOTE — PROGRESS NOTES
Idalia Burn    Chief Complaint   Patient presents with    Follow-up     3 month follow up     Palpitations     sometimes    Leg Swelling     mild   f/u HFmrEF, ICD  Prior Jim pt, last seen 11/10/2020    CORINA Vickers is a 79 y.o. -American female with history of a dilated cardiomyopathy with mild left ventricular dysfunction and an ejection fraction in the 40 to 45% range with widely patent coronary arteries and a cardiac catheterization back in August 1996. An echocardiogram completed on May 5, 2017 suggested an ejection fraction of 50%.  She did have a biventricular AICD placed but due to trauma and infection in that area she had that removed and now she has a biventricular pacemaker with pulse generator residing in her left upper quadrant placed back in October 2012.     Unfortunately, she fell at home and traumatized her back developing an infection of her right Psoas muscle hematoma with development of epidural abscess with neurologic compromise resulting in paraplegia back in April 2017.     She had her pacemaker generator changed by Dr. Aidan Bravo on February 26, 2019. Maik Stoll should be noted that she had an infected right foot and was on antibiotics for for some time before the generator change and she is now off antibiotics and foot is now healing by secondary intention and apparently is doing well according to the patient.      She has had some problems with chronic heart failure for which we have tried to adjust her diuretics. Her last echocardiogram was completed on August 17, 2020 demonstrated a left ventricular ejection fraction of 25 to 30% which was slightly improved from past studies and suggested some benefit from her ongoing Entresto therapy.  She does have stage III chronic kidney disease which makes more aggressive diuresis a little challenging.       Past Medical History:   Diagnosis Date    Acute paraplegia ([address] 4/20/2017    Benign hypertensive heart disease with systolic CHF, NYHA class 2 ([address] 9/5/2012    Biventricular implantable cardioverter-defibrillator in situ 04/28/2005    Upgraded to BiV AICD; gen change 4/2008; pocket revision 10/2009; Abdominal - done on 8/22/2012 by Dr. Leonard Mccarthy Cardiac cath 08/15/1996    Patent coronaries. Elev LVEDP. EF 50-55%.  Cardiac echocardiogram 06/23/2015    Ltd study. EF 45-50%. Mild, diffuse hypk. Severe apical hypk. No mass or thrombus was clearly identified, although imaging was suboptimal.      Cardiac nuclear imaging test 06/19/2015    Fixed distal apical, distal septal defect more likely due to RV pacing than prior infarct. No ischemia. EF 46%. RWMA c/w RV pacing. Nondiagnostic EKG on pharm stress test.      Cardiovascular lower extremity venous duplex 09/04/2012    Acute, non-occlusive DVT in CFV on right. No DVT on left. No superficial thrombosis bilaterally.  Cardiovascular upper extremity venous duplex 08/27/2012    DVT in axillary vein on left. Left subclavian was not visualized.     Chronic anemia 9/5/2012    Chronic systolic heart failure (HCC)     Decreased calculated glomerular filtration rate (GFR) 3/30/2017    Calculated GFR equivalent to that of CKD stage 3 = 30-59 ml/min    Diabetic neuropathy associated with type 2 diabetes mellitus ([address] 6/28/2011    Difficult airway for intubation 08/22/2012    see anesthesia airway note    Dyslipidemia 6/28/2011    Gout     History of complete heart block 06/28/2011    History of Coumadin therapy     Anticoagulation for DVT of the LUE; Discontinued on 3/30/2017    History of deep venous thrombosis 9/5/2012    Left upper extremity    History of pyelonephritis 3/30/2017    Left bundle branch block (LBBB) on electrocardiogram 06/28/2011    Nonischemic cardiomyopathy ([address] 6/28/2011    Obesity (BMI 35.0-39.9 without comorbidity) 3/13/2017    Obstructive sleep apnea on CPAP 2/7/2012    Psoas abscess, right ([address] 4/20/2017    Psoas hematoma, right, secondary to anticoagulant therapy 3/30/2017    Type 2 diabetes mellitus with diabetic neuropathy ([facility] 6/28/2011       Past Surgical History:   Procedure Laterality Date    HX CARPAL TUNNEL RELEASE  4/07    right     HX CHOLECYSTECTOMY  1994    HX HYSTERECTOMY  1973    HX OTHER SURGICAL  6/11/2012    AICD revision    HX PACEMAKER  4/28/2005    [employer] AICD    OR REMVL PERM PM PLS GEN W/REPL PLSE GEN 2 LEAD SYS N/A 2/26/2019    REMOVE & REPLACE PPM GEN DUAL LEAD performed by Dorrine Dakins, MD at [facility] CATH LAB       Current Outpatient Medications   Medication Sig Dispense Refill    furosemide (LASIX) 40 mg tablet TAKE 1 AND 1/2 TABLETS BY MOUTH DAILY 135 Tab 3    diazePAM (Valium) 10 mg tablet Take 1 Tab by mouth every six (6) hours as needed for Anxiety. Max Daily Amount: 40 mg. 12 Tab 1    Entresto  mg tablet TAKE 1 TABLET BY MOUTH TWICE A  Tab 3    carvediloL (COREG) 6.25 mg tablet TAKE 1 TABLET BY MOUTH TWICE A  Tab 3    levoFLOXacin (LEVAQUIN) 500 mg tablet Take 1 Tab by mouth daily. 7 Tab 0    VITAMIN D3-VITAMIN K2, MK4, PO Take 1 Tab by mouth daily.  cyanocobalamin/folic acid (vitamin E41-UEZHD acid) 1,230-234 mcg lozg Take 2 Tabs by mouth daily.  ZINC GLUCONATE PO Take 25 mg by mouth daily.  IODINE PO Take 10 mg by mouth daily.  magnesium hydroxide (Camara Milk of Magnesia) 400 mg/5 mL suspension Take 5 mL by mouth as needed for Constipation.  insulin glargine (LANTUS U-100 INSULIN) 100 unit/mL injection 10 Units by SubCUTAneous route nightly. Indications: type 2 diabetes mellitus 10 Vial 0    exenatide microspheres (BYDUREON) 2 mg serr 2 mg by SubCUTAneous route every seven (7) days.  multivitamin (ONE A DAY) tablet Take 1 Tab by mouth daily.  metFORMIN ER (GLUCOPHAGE XR) 500 mg tablet Take 500 mg by mouth two (2) times daily (with meals).       cholecalciferol, VITAMIN D3, (VITAMIN D3) 5,000 unit tab tablet Take 5,000 Units by mouth daily. take 1 tab daily of vitamin D3, 5000 units      gabapentin (NEURONTIN) 300 mg capsule Take 2 Caps by mouth two (2) times a day. Indications: NEUROPATHIC PAIN 100 Cap 0       Allergies   Allergen Reactions    Latex Itching    Vancomycin Itching    Ampicillin Itching    Bactrim [Sulfamethoxazole-Trimethoprim] Itching    Blueberry Swelling     Causes throat swelling    Ciprofloxacin Itching    Codeine Other (comments)     Jumpy feeling    Crestor [Rosuvastatin] Itching    Darvocet A500 [Propoxyphene N-Acetaminophen] Itching    Demerol [Meperidine] Itching    Levaquin [Levofloxacin] Itching    Lipitor [Atorvastatin] Myalgia    Magnesium Oxide Itching     nausea    Minocin [Minocycline] Itching    Pcn [Penicillins] Itching    Pravachol [Pravastatin] Swelling     Swelling in mouth.    Not allergic per patient, takes at home    Shellfish Derived Swelling     itching    Sulfa (Sulfonamide Antibiotics) Itching    Ultracet [Tramadol-Acetaminophen] Itching    Vicodin [Hydrocodone-Acetaminophen] Itching    Vytorin 10-10 [Ezetimibe-Simvastatin] Myalgia    Percodan [Oxycodone Hcl-Oxycodone-Asa] Itching       Social History     Socioeconomic History    Marital status:      Spouse name: Not on file    Number of children: Not on file    Years of education: Not on file    Highest education level: Not on file   Occupational History    Not on file   Social Needs    Financial resource strain: Not on file    Food insecurity     Worry: Not on file     Inability: Not on file    Transportation needs     Medical: Not on file     Non-medical: Not on file   Tobacco Use    Smoking status: Never Smoker    Smokeless tobacco: Never Used   Substance and Sexual Activity    Alcohol use: Not Currently    Drug use: No    Sexual activity: Not Currently     Partners: Male   Lifestyle    Physical activity     Days per week: Not on file     Minutes per session: Not on file    Stress: Not on file   Relationships    Social connections     Talks on phone: Not on file     Gets together: Not on file     Attends Orthodoxy service: Not on file     Active member of club or organization: Not on file     Attends meetings of clubs or organizations: Not on file     Relationship status: Not on file    Intimate partner violence     Fear of current or ex partner: Not on file     Emotionally abused: Not on file     Physically abused: Not on file     Forced sexual activity: Not on file   Other Topics Concern    Not on file   Social History Narrative    Not on file        The patient has a family history of    Review of Systems    14 pt Review of Systems is negative unless otherwise mentioned in the HPI. Wt Readings from Last 3 Encounters:   01/22/21 109.8 kg (242 lb)   11/18/20 109.8 kg (242 lb)   11/10/20 109.8 kg (242 lb)     Temp Readings from Last 3 Encounters:   01/22/21 97.3 °F (36.3 °C)   12/12/20 97.6 °F (36.4 °C) (Tympanic)   12/12/20 97.6 °F (36.4 °C)     BP Readings from Last 3 Encounters:   02/11/21 120/64   12/12/20 118/68   12/12/20 118/68     Pulse Readings from Last 3 Encounters:   02/11/21 72   12/12/20 78   12/12/20 78       08/17/20   ECHO ADULT COMPLETE 08/17/2020 8/17/2020    Narrative · Contrast used: DEFINITY. · Echo study was technically difficulty. · LV: Estimated LVEF is 25 - 30%. Visually measured ejection fraction. Normal cavity size. Mildly increased wall thickness. Severely reduced   systolic function. Mild (grade 1) left ventricular diastolic dysfunction. · IVC: Moderately elevated central venous pressure (10-15 mmHg); IVC   diameter is larger than 21 mm and collapses more than 50% with   respiration. · PA: Pulmonary arterial systolic pressure is 34 mmHg. Pulmonary   hypertension not suggested by Doppler findings. · LA: Left Atrium volume index is 28 mL/m2. · RV: Dilated right ventricle. Reduced systolic function. · RA: Dilated right atrium.         Signed by: Miladys Kumar Lola Mendez MD       Physical Exam:    Visit Vitals  /64 (BP 1 Location: Right arm, BP Patient Position: Sitting, BP Cuff Size: Large adult)   Pulse 72   Ht 5' 7\" (1.702 m)   LMP  (LMP Unknown)   SpO2 98%   BMI 37.90 kg/m²      Physical Exam  HENT:      Head: Normocephalic and atraumatic. Eyes:      Pupils: Pupils are equal, round, and reactive to light. Cardiovascular:      Rate and Rhythm: Normal rate and regular rhythm. Heart sounds: Normal heart sounds. No murmur. No friction rub. No gallop. Comments: No JVD, JVP just above clavicle with pt at 90 degrees  Pulmonary:      Effort: Pulmonary effort is normal. No respiratory distress. Breath sounds: Normal breath sounds. No wheezing or rales. Chest:      Chest wall: No tenderness. Abdominal:      General: Bowel sounds are normal.      Palpations: Abdomen is soft. Musculoskeletal:         General: No tenderness. Right lower le+ Pitting Edema present. Left lower le+ Pitting Edema present. Skin:     General: Skin is warm and dry. Neurological:      Mental Status: She is alert and oriented to person, place, and time. EKG today shows: NSR, normal axis and intervals, no ST segment abnormalities    Lab Results   Component Value Date/Time    Sodium 140 2020 02:30 PM    Potassium 4.0 2020 02:30 PM    Chloride 106 2020 02:30 PM    CO2 28 2020 02:30 PM    Anion gap 6 2020 02:30 PM    Glucose 106 (H) 2020 02:30 PM    BUN 34 (H) 2020 02:30 PM    Creatinine 1.66 (H) 2020 02:30 PM    BUN/Creatinine ratio 20 2020 02:30 PM    GFR est AA 37 (L) 2020 02:30 PM    GFR est non-AA 31 (L) 2020 02:30 PM    Calcium 9.6 2020 02:30 PM    Bilirubin, total 0.7 2020 04:22 PM    Alk.  phosphatase 106 2020 04:22 PM    Protein, total 7.7 2020 04:22 PM    Albumin 3.5 2020 04:22 PM    Globulin 4.2 (H) 2020 04:22 PM    A-G Ratio 0.8 2020 04:22 PM    ALT (SGPT) 26 02/11/2020 04:22 PM    AST (SGOT) 26 02/11/2020 04:22 PM     Lab Results   Component Value Date/Time    WBC 6.5 08/03/2020 04:49 PM    Hemoglobin, POC 7.8 (L) 08/22/2012 09:30 AM    HGB 11.3 (L) 08/03/2020 04:49 PM    Hematocrit, POC 23 (L) 08/22/2012 09:30 AM    HCT 33.8 (L) 08/03/2020 04:49 PM    PLATELET 434 18/41/3694 04:49 PM    MCV 80.9 08/03/2020 04:49 PM     Lab Results   Component Value Date/Time    Cholesterol, total 127 08/17/2018 02:40 AM    HDL Cholesterol 42 08/17/2018 02:40 AM    LDL, calculated 61.6 08/17/2018 02:40 AM    VLDL, calculated 23.4 08/17/2018 02:40 AM    Triglyceride 117 08/17/2018 02:40 AM    CHOL/HDL Ratio 3.0 08/17/2018 02:40 AM     Lab Results   Component Value Date/Time    TSH 0.29 (L) 12/14/2019 12:55 PM     Lab Results   Component Value Date/Time    Hemoglobin A1c 5.8 (H) 12/14/2019 01:44 AM         Impression and Plan:  Mariela Baker is a 79 y.o. with:    1.) AECHF/ HFmrEF 40-45%, with +LE edema (but JVD normal)  2.) ICD, with h/o infection, device checked today  3.) DM2, well controlled  4.) HTN, at goal  5.) CKD 3, SCr baseline ~1.4  6.) Abn TSH?  7.) H/o recurrent infections  8.) Poor functional status/ Paraplegia    1.) Ok to continue Lasix 60 mg daily, but on \"bad days\" takes 80 mg  2.) Will add Metolazone 2.5 mg 3x/week for just one week, then twice a week and hopefully can go weekly vs stop within a few weeks  3.) Has historically not needed K replacement, but with aggressive sequential nephron blockade (and inability to track her weights/ she cannot stand), will check labs (Chem + BNP) and close follow up with NP 4 weeks  4.) RTC with me 6 months    Meeting pt for first time as she transitions her long term CV care over to me  Has multiple med problems, and presents in AECHF/ HFmrEF  All questions answered with pt and her daughter who brings her  >60 mins spent, incl education and extensive review of her case      Thank you for allowing me to participate in the care of your patient, please do not hesitate to call with questions or concerns.     [address] [facility],    [facility], DO

## 2021-02-12 LAB
BUN SERPL-MCNC: 42 MG/DL (ref 8–27)
BUN/CREAT SERPL: 23 (ref 12–28)
CALCIUM SERPL-MCNC: 9.6 MG/DL (ref 8.7–10.3)
CHLORIDE SERPL-SCNC: 101 MMOL/L (ref 96–106)
CO2 SERPL-SCNC: 23 MMOL/L (ref 20–29)
CREAT SERPL-MCNC: 1.84 MG/DL (ref 0.57–1)
GLUCOSE SERPL-MCNC: 179 MG/DL (ref 65–99)
NT-PROBNP SERPL-MCNC: 756 PG/ML (ref 0–301)
POTASSIUM SERPL-SCNC: 4.8 MMOL/L (ref 3.5–5.2)
SODIUM SERPL-SCNC: 142 MMOL/L (ref 134–144)

## 2021-02-15 NOTE — PROGRESS NOTES
Per your last note\"  Jody Zaragoza is a 79 y.o. with:     1.) AECHF/ HFmrEF 40-45%, with +LE edema (but JVD normal)  2.) ICD, with h/o infection, device checked today  3.) DM2, well controlled  4.) HTN, at goal  5.) CKD 3, SCr baseline ~1.4  6.) Abn TSH?  7.) H/o recurrent infections  8.) Poor functional status/ Paraplegia     1.) Ok to continue Lasix 60 mg daily, but on \"bad days\" takes 80 mg  2.) Will add Metolazone 2.5 mg 3x/week for just one week, then twice a week and hopefully can go weekly vs stop within a few weeks  3.) Has historically not needed K replacement, but with aggressive sequential nephron blockade (and inability to track her weights/ she cannot stand), will check labs (Chem + BNP) and close follow up with NP 4 weeks  4.) RTC with me 6 months     Meeting pt for first time as she transitions her long term CV care over to me  Has multiple med problems, and presents in AECHF/ HFmrEF  All questions answered with pt and her daughter who brings her  >60 mins spent, incl education and extensive review of her case

## 2021-02-18 ENCOUNTER — TELEPHONE (OUTPATIENT)
Dept: CARDIOLOGY CLINIC | Age: 71
End: 2021-02-18

## 2021-02-18 NOTE — TELEPHONE ENCOUNTER
Verified patient name and . This has been fully explained to the patient, who indicates understanding.

## 2021-02-18 NOTE — TELEPHONE ENCOUNTER
----- Message from Tio Salas DO sent at 2/16/2021  2:23 PM EST ----- Labs are stable, slight increase in her SCr Can discuss further at her appt with Adan Jimenez in a few weeks 
----- Message ----- From: Moses Barry LPN Sent: 2/15/2021   2:27 PM EST To: Tio Salas DO Per your last note\"  Bere Ha is a 79 y.o. with: 
  
1.) AECHF/ HFmrEF 40-45%, with +LE edema (but JVD normal) 2.) ICD, with h/o infection, device checked today 
3.) DM2, well controlled 4.) HTN, at goal 
5.) CKD 3, SCr baseline ~1.4 
6.) Abn TSH? 
7.) H/o recurrent infections 8.) Poor functional status/ Paraplegia 
  
1.) Ok to continue Lasix 60 mg daily, but on \"bad days\" takes 80 mg 
2.) Will add Metolazone 2.5 mg 3x/week for just one week, then twice a week and hopefully can go weekly vs stop within a few weeks 3.) Has historically not needed K replacement, but with aggressive sequential nephron blockade (and inability to track her weights/ she cannot stand), will check labs (Chem + BNP) and close follow up with NP 4 weeks 4.) RTC with me 6 months 
  
Meeting pt for first time as she transitions her long term CV care over to me Has multiple med problems, and presents in AECHF/ HFmrEF All questions answered with pt and her daughter who brings her 
>60 mins spent, incl education and extensive review of her case

## 2021-03-08 NOTE — PROGRESS NOTES
Charlie Borja presents today for a 4-6 week follow-up. She saw Dr Kyree Smith for the first time on 2/11/21. During that visit, she appeared to be in acute on chronic moderately reduced EF heart failure. She was instructed to continue taking Lasix 60mg daily but was started on metolazone 2.5mg 3 times a week for one week then 2 times a week for one week and then as directed. She had a BMP and NT pro-BNP done and it showed some mild elevation of her creatinine at 1.8 and her NT pro-BNP was 756 (0 to 301 normal). Mrs. Yuriy Deras and her daughter state that they have noticed some improvement in her lower extremity edema. The left leg always has slightly more. She has history of DVT in her right lower extremity. She still experiences some GROVER. Since the metolazone is down to 2.5mg once a week, she has not noticed any further improvement in her edema and shortness of breath. She is a 79year old female with a history of a dilated cardiomyopathy, mild left ventricular dysfunction, and widely patent coronary arteries (cardiac catheterization done in August 1996). She also has a biventricular pacemaker but not an AICD placed in October of 2012 (in abdomen). She previously had a biventricular ICD but it was removed due to infection at the site. She was last seen by Dr. Marcello Montes on July 15, 2019. A pharmacologic nuclear stress test was completed on 6/19/15 showed no reversible perfusion imaging abnormalities concerning for myocardial ischemia. In April 2017, she fell at home, traumatized her back and developed an infected right psoas hematoma with development of an epidural abscess with neurological compromise with resulting paraplegia. She had her pacemaker generator changed by Dr. Adolph Lewis back on February 26, 2019. She had an echocardiogram done on May 16, 2019 showed an ejection fraction 26 to 30%.   Her last echo was done in August 2020 and it showed an EF of 25-30%, severely reduced systolic function, and grade 1 diastolic dysfunction. She is s/p cystoscopy and suprapubic tube placement on 9/17/19 (due to recurrent UTIs). She was hospitalized from 12/12/19 through 12/16/19 after presenting to the ER with complaints of increasing shortness of breath and orthopnea. Her NT pro-BNP was noted to be 3357 and she was diagnosed with acute on chronic systolic heart failure. She was treated with IV lasix with improvement in her symptoms. Her suprapubic catheter was changed on 12/12/19 by urology. She had an echo done on 12/13/19 and it showed an EF of 20-25%, LV global hypokinesis. She denies any chest pain, palpitations, and shortness of breath at rest.  She denies chest pain, tightness, heaviness, and palpitations. She denies shortness of breath at rest, admits to some dyspnea on exertion, denies orthopnea and denies PND. She denies abdominal bloating. She denies lightheadedness, dizziness, and syncope. She admits to lower extremity edema and denies claudication. Denies nausea, vomiting, diarrhea, fever, chills. She admits to joint and neuropathy pain. She admits to peripheral neuropathy. She has an infection in her right foot which appears to be healing. She states that she has a little bit more swelling in the right leg than in the left. PMH:  Past Medical History:   Diagnosis Date    Acute paraplegia ([facility] 4/20/2017    Benign hypertensive heart disease with systolic CHF, NYHA class 2 ([facility] 9/5/2012    Biventricular implantable cardioverter-defibrillator in situ 04/28/2005    Upgraded to BiV AICD; gen change 4/2008; pocket revision 10/2009; Abdominal - done on 8/22/2012 by Dr. Mi Jarquin Cardiac cath 08/15/1996    Patent coronaries. Elev LVEDP. EF 50-55%.  Cardiac echocardiogram 06/23/2015    Ltd study. EF 45-50%. Mild, diffuse hypk. Severe apical hypk.   No mass or thrombus was clearly identified, although imaging was suboptimal.      Cardiac nuclear imaging test 06/19/2015    Fixed distal apical, distal septal defect more likely due to RV pacing than prior infarct. No ischemia. EF 46%. RWMA c/w RV pacing. Nondiagnostic EKG on pharm stress test.      Cardiovascular lower extremity venous duplex 09/04/2012    Acute, non-occlusive DVT in CFV on right. No DVT on left. No superficial thrombosis bilaterally.  Cardiovascular upper extremity venous duplex 08/27/2012    DVT in axillary vein on left. Left subclavian was not visualized.     Chronic anemia 9/5/2012    Chronic systolic heart failure (HCC)     Decreased calculated glomerular filtration rate (GFR) 3/30/2017    Calculated GFR equivalent to that of CKD stage 3 = 30-59 ml/min    Diabetic neuropathy associated with type 2 diabetes mellitus ([address] 6/28/2011    Difficult airway for intubation 08/22/2012    see anesthesia airway note    Dyslipidemia 6/28/2011    Gout     History of complete heart block 06/28/2011    History of Coumadin therapy     Anticoagulation for DVT of the LUE; Discontinued on 3/30/2017    History of deep venous thrombosis 9/5/2012    Left upper extremity    History of pyelonephritis 3/30/2017    Left bundle branch block (LBBB) on electrocardiogram 06/28/2011    Nonischemic cardiomyopathy ([address] 6/28/2011    Obesity (BMI 35.0-39.9 without comorbidity) 3/13/2017    Obstructive sleep apnea on CPAP 2/7/2012    Psoas abscess, right ([address] 4/20/2017    Psoas hematoma, right, secondary to anticoagulant therapy 3/30/2017    Type 2 diabetes mellitus with diabetic neuropathy ([address] 6/28/2011       PSH:  Past Surgical History:   Procedure Laterality Date    HX CARPAL TUNNEL RELEASE  4/07    right     HX CHOLECYSTECTOMY  1994    HX HYSTERECTOMY  1973    HX OTHER SURGICAL  6/11/2012    AICD revision    HX PACEMAKER  4/28/2005    [employer] AICD    [place] REMVL PERM PM PLS GEN W/REPL PLSE GEN 2 LEAD SYS N/A 2/26/2019    REMOVE & REPLACE PPM GEN DUAL LEAD performed by Allan Narayanan MD at [facility] CATH LAB MEDS:  Current Outpatient Medications   Medication Sig    metOLazone (ZAROXOLYN) 2.5 mg tablet Take 3 times a week for 1 week, then 2 times a week for second week, then once weekly thereafter.  furosemide (LASIX) 40 mg tablet TAKE 1 AND 1/2 TABLETS BY MOUTH DAILY    diazePAM (Valium) 10 mg tablet Take 1 Tab by mouth every six (6) hours as needed for Anxiety. Max Daily Amount: 40 mg.    Entresto  mg tablet TAKE 1 TABLET BY MOUTH TWICE A DAY    carvediloL (COREG) 6.25 mg tablet TAKE 1 TABLET BY MOUTH TWICE A DAY    levoFLOXacin (LEVAQUIN) 500 mg tablet Take 1 Tab by mouth daily.  VITAMIN D3-VITAMIN K2, MK4, PO Take 1 Tab by mouth daily.  cyanocobalamin/folic acid (vitamin X11-YSCKV acid) 4,522-350 mcg lozg Take 2 Tabs by mouth daily.  ZINC GLUCONATE PO Take 25 mg by mouth daily.  IODINE PO Take 10 mg by mouth daily.  magnesium hydroxide (Camara Milk of Magnesia) 400 mg/5 mL suspension Take 5 mL by mouth as needed for Constipation.  insulin glargine (LANTUS U-100 INSULIN) 100 unit/mL injection 10 Units by SubCUTAneous route nightly. Indications: type 2 diabetes mellitus    exenatide microspheres (BYDUREON) 2 mg serr 2 mg by SubCUTAneous route every seven (7) days.  multivitamin (ONE A DAY) tablet Take 1 Tab by mouth daily.  metFORMIN ER (GLUCOPHAGE XR) 500 mg tablet Take 500 mg by mouth two (2) times daily (with meals).  cholecalciferol, VITAMIN D3, (VITAMIN D3) 5,000 unit tab tablet Take 5,000 Units by mouth daily. take 1 tab daily of vitamin D3, 5000 units    gabapentin (NEURONTIN) 300 mg capsule Take 2 Caps by mouth two (2) times a day. Indications: NEUROPATHIC PAIN     No current facility-administered medications for this visit.         Allergies and Sensitivities:  Allergies   Allergen Reactions    Latex Itching    Vancomycin Itching    Ampicillin Itching    Bactrim [Sulfamethoxazole-Trimethoprim] Itching    Blueberry Swelling     Causes throat swelling    Ciprofloxacin Itching    Codeine Other (comments)     Jumpy feeling    Crestor [Rosuvastatin] Itching    Darvocet A500 [Propoxyphene N-Acetaminophen] Itching    Demerol [Meperidine] Itching    Levaquin [Levofloxacin] Itching    Lipitor [Atorvastatin] Myalgia    Magnesium Oxide Itching     nausea    Minocin [Minocycline] Itching    Pcn [Penicillins] Itching    Pravachol [Pravastatin] Swelling     Swelling in mouth. Not allergic per patient, takes at home    Shellfish Derived Swelling     itching    Sulfa (Sulfonamide Antibiotics) Itching    Ultracet [Tramadol-Acetaminophen] Itching    Vicodin [Hydrocodone-Acetaminophen] Itching    Vytorin 10-10 [Ezetimibe-Simvastatin] Myalgia    Percodan [Oxycodone Hcl-Oxycodone-Asa] Itching       Family History:  Family History   Problem Relation Age of Onset    Cancer Father         Leukemia       Social History:  She  reports that she has never smoked. She has never used smokeless tobacco.  She  reports previous alcohol use. Physical:  Visit Vitals  /70 (BP 1 Location: Right upper arm, BP Patient Position: Sitting, BP Cuff Size: Adult)   Pulse 64   Ht 5' 7\" (1.702 m)   Wt 107 kg (236 lb)   SpO2 100%   BMI 36.96 kg/m²     She reports that the weight noted above is her weight from her last hospitalization    She is unable to weigh as she is non-weight bearing and paraplegic    Exam:  Neck:  Supple, no JVD, no carotid bruits  CV:  Normal S1 and  S2, grade II/VI DEE noted along the LSB, no rubs, or gallops noted  Lungs:  Clear to ausculation throughout, no wheezes or rales  Abd:  Soft, non-tender, obese, non-distended with good bowel sounds. No hepatosplenomegaly. Suprapubic catheter in place  Extremities:  Trace to 1+ softly pitting lower extremity edema, left greater than right at present.   No redness or unusual warmth      Data:  EKG:   Not done today      LABS:  Lab Results   Component Value Date/Time    Sodium 142 02/11/2021 03:17 PM    Potassium 4.8 02/11/2021 03:17 PM    Chloride 101 02/11/2021 03:17 PM    CO2 23 02/11/2021 03:17 PM    Glucose 179 (H) 02/11/2021 03:17 PM    BUN 42 (H) 02/11/2021 03:17 PM    Creatinine 1.84 (H) 02/11/2021 03:17 PM     Lab Results   Component Value Date/Time    Cholesterol, total 127 08/17/2018 02:40 AM    HDL Cholesterol 42 08/17/2018 02:40 AM    LDL, calculated 61.6 08/17/2018 02:40 AM    Triglyceride 117 08/17/2018 02:40 AM    CHOL/HDL Ratio 3.0 08/17/2018 02:40 AM     Lab Results   Component Value Date/Time    ALT (SGPT) 26 02/11/2020 04:22 PM     Impression / Plan:  1. Non-ischemic cardiomyopathy, with Bi-ventricular pacemaker but not ICD (located in abdomen)  2. Hypertension, blood pressure well-controlled  3. Chronic systolic heart failure,  NYHA class 2-3, appears compensated   4. Diabetes, recommend Hgb A1c less than 7% from cardiac standpoint  5. Hypercholesterolemia, unable to tolerate statins  6. Fatigue  7. Recurrent UTIs, followed by urology, now with suprapubic catheter      Mrs. Tracy Blankenship was seen today for a 3-4 week follow-up of CHF. When seen by Dr. Penny Khalil on 2/11/21, she was started on metolazone 2.5mg 3x/week for one week, then 2x/week for one week, and currently 2.5mg once a week due to volume overload. She and her daughter report that they have noticed improvement in her lower extremity edema after the initiation of metolazone. Currently, the edema is greater in the left leg versus the right leg but still improved compared to pre-metolazone. She is still experiencing some GROVER especially when transferring from her chair to her bed. Her BMP and NT pro-BNP results were discussed with them (pre-metolazone). I asked that she take metolazone twice a week this week only and then back to once a week. She was advised to take the metolazone at least 30 minutes prior to taking the Lasix. She was given a lab slip for a repeat BMP and NT pro-BNP in about 2 weeks.   She states that she is due to have labs done for Dr. Jeff Forward (Nephrology) in about 2 weeks, prior to her appointment with him near the end of March. I asked that she just have the labs done at that time. She will let us know if nephrology allows her to continue taking the metolazone. Congestive heart failure teaching reinforced today. Advised to limit sodium intake to no more than 2000mg per day and to also watch fluid intake. She states that she drinks between 48 and 56 ounces per day. Advised to weigh daily every morning and record weights (not possible). Instructed to call our office if progressive weight gain is noted over a 2 to 3 day period of time, shortness of breath increases, or if abdominal bloating, nausea, fatigue, or increased lower extremity edema is noted. She will follow-up with Dr. Blanca Porter as scheduled and PRN. Kaleen Goldmann MSN, FNP-BC    Please note:  Portions of this chart were created with Dragon medical speech to text program.  Unrecognized errors may be present.

## 2021-03-15 ENCOUNTER — OFFICE VISIT (OUTPATIENT)
Dept: CARDIOLOGY CLINIC | Age: 71
End: 2021-03-15
Payer: MEDICARE

## 2021-03-15 VITALS
HEART RATE: 64 BPM | DIASTOLIC BLOOD PRESSURE: 70 MMHG | BODY MASS INDEX: 37.04 KG/M2 | HEIGHT: 67 IN | WEIGHT: 236 LBS | OXYGEN SATURATION: 100 % | SYSTOLIC BLOOD PRESSURE: 128 MMHG

## 2021-03-15 DIAGNOSIS — I50.20 BENIGN HYPERTENSIVE HEART DISEASE WITH SYSTOLIC CHF, NYHA CLASS 2 (HCC): ICD-10-CM

## 2021-03-15 DIAGNOSIS — Z95.810 BIVENTRICULAR IMPLANTABLE CARDIOVERTER-DEFIBRILLATOR IN SITU: ICD-10-CM

## 2021-03-15 DIAGNOSIS — I50.22 CHRONIC SYSTOLIC HEART FAILURE (HCC): Primary | ICD-10-CM

## 2021-03-15 DIAGNOSIS — E78.5 DYSLIPIDEMIA: ICD-10-CM

## 2021-03-15 DIAGNOSIS — R06.02 SHORTNESS OF BREATH: ICD-10-CM

## 2021-03-15 DIAGNOSIS — I42.8 NONISCHEMIC CARDIOMYOPATHY (HCC): ICD-10-CM

## 2021-03-15 DIAGNOSIS — I11.0 BENIGN HYPERTENSIVE HEART DISEASE WITH SYSTOLIC CHF, NYHA CLASS 2 (HCC): ICD-10-CM

## 2021-03-15 PROCEDURE — 1090F PRES/ABSN URINE INCON ASSESS: CPT | Performed by: NURSE PRACTITIONER

## 2021-03-15 PROCEDURE — G8417 CALC BMI ABV UP PARAM F/U: HCPCS | Performed by: NURSE PRACTITIONER

## 2021-03-15 PROCEDURE — G8432 DEP SCR NOT DOC, RNG: HCPCS | Performed by: NURSE PRACTITIONER

## 2021-03-15 PROCEDURE — G8427 DOCREV CUR MEDS BY ELIG CLIN: HCPCS | Performed by: NURSE PRACTITIONER

## 2021-03-15 PROCEDURE — 3017F COLORECTAL CA SCREEN DOC REV: CPT | Performed by: NURSE PRACTITIONER

## 2021-03-15 PROCEDURE — G8400 PT W/DXA NO RESULTS DOC: HCPCS | Performed by: NURSE PRACTITIONER

## 2021-03-15 PROCEDURE — 1101F PT FALLS ASSESS-DOCD LE1/YR: CPT | Performed by: NURSE PRACTITIONER

## 2021-03-15 PROCEDURE — G8536 NO DOC ELDER MAL SCRN: HCPCS | Performed by: NURSE PRACTITIONER

## 2021-03-15 PROCEDURE — 99213 OFFICE O/P EST LOW 20 MIN: CPT | Performed by: NURSE PRACTITIONER

## 2021-03-15 NOTE — PROGRESS NOTES
Natalie Hurt presents today for   Chief Complaint   Patient presents with    Follow-up     4 week f/u       Natalie Hurt preferred language for health care discussion is english/other. Is someone accompanying this pt? Yes, daughter    Is the patient using any DME equipment during OV? Mobility scooter    Depression Screening:  3 most recent PHQ Screens 3/15/2021   Little interest or pleasure in doing things Not at all   Feeling down, depressed, irritable, or hopeless Not at all   Total Score PHQ 2 0       Learning Assessment:  Learning Assessment 3/15/2021   PRIMARY LEARNER Patient   HIGHEST LEVEL OF EDUCATION - PRIMARY LEARNER  -   BARRIERS PRIMARY LEARNER -   CO-LEARNER CAREGIVER -   PRIMARY LANGUAGE ENGLISH   LEARNER PREFERENCE PRIMARY DEMONSTRATION   ANSWERED BY patient   RELATIONSHIP SELF       Abuse Screening:  Abuse Screening Questionnaire 3/15/2021   Do you ever feel afraid of your partner? N   Are you in a relationship with someone who physically or mentally threatens you? N   Is it safe for you to go home? Y       Fall Risk  Fall Risk Assessment, last 12 mths 3/15/2021   Able to walk? Yes   Fall in past 12 months? 0   Do you feel unsteady? 0   Are you worried about falling 0       Pt currently taking Anticoagulant therapy? no    Coordination of Care:  1. Have you been to the ER, urgent care clinic since your last visit? Hospitalized since your last visit? no    2. Have you seen or consulted any other health care providers outside of the [address] [place] [address] since your last visit? Include any pap smears or colon screening.  no

## 2021-03-15 NOTE — PATIENT INSTRUCTIONS
Metolazone 2.5mg twice a week for one week only and then once a week. Please take the metolazone at least 30 minutes prior to the dose of Lasix. BMP and NT pro-BNP in 2 weeks or with labs to be done for Nephrology Follow-up with Dr. Naga Jones as scheduled and as needed Continue low sodium diet, 2000mg per day Limit fluid intake to 48 to 56 ounces per day, 6 to 7, eight ounce cups of anything liquid.

## 2021-03-23 LAB
BNP SERPL-MCNC: 131.7 PG/ML (ref 0–100)
BUN SERPL-MCNC: 41 MG/DL (ref 8–27)
BUN/CREAT SERPL: 21 (ref 12–28)
CALCIUM SERPL-MCNC: 9.8 MG/DL (ref 8.7–10.3)
CHLORIDE SERPL-SCNC: 101 MMOL/L (ref 96–106)
CO2 SERPL-SCNC: 25 MMOL/L (ref 20–29)
CREAT SERPL-MCNC: 1.98 MG/DL (ref 0.57–1)
GLUCOSE SERPL-MCNC: 241 MG/DL (ref 65–99)
POTASSIUM SERPL-SCNC: 5 MMOL/L (ref 3.5–5.2)
SODIUM SERPL-SCNC: 140 MMOL/L (ref 134–144)
SPECIMEN STATUS REPORT, ROLRST: NORMAL

## 2021-06-05 NOTE — MR AVS SNAPSHOT
[address] [address] [address] [address] 
[phone] Patient: Ivon Dave MRN: [id number] RPJ:[id number] Visit Information Date & Time Provider Department Dept. Phone Encounter #  
 7/19/2018 11:30 AM Niels Sanz NP [facility] [address] [id number] Your Appointments 9/17/2018 12:40 PM  
Follow Up with Bossman Damian DO [facility] Appt Note: 6 month f/up [place] [address] [address] [id number] [phone] [facility] [place] [address] [address] [facility] [address]  
  
    
  
 9/27/2018  8:45 AM  
Any with Steph Brand MD  
[facility] ([facility]) Appt Note: Return in about 3 months (around 9/27/2018) for follow up. [address] [facility] [address] [address] [address] [facility]  
  
   
 [address] [facility] [address] [facility],[address] [facility] Upcoming Health Maintenance Date Due Hepatitis C Screening 1950 FOOT EXAM Q1 7/27/1960 MICROALBUMIN Q1 7/27/1960 EYE EXAM RETINAL OR DILATED Q1 7/27/1960 DTaP/Tdap/Td series (1 - Tdap) 7/27/1971 ZOSTER VACCINE AGE 60> 5/27/2010 BREAST CANCER SCRN MAMMOGRAM 1/19/2014 GLAUCOMA SCREENING Q2Y 7/27/2015 Bone Densitometry (Dexa) Screening 7/27/2015 Pneumococcal 65+ Low/Medium Risk (1 of 2 - PCV13) 7/27/2015 HEMOGLOBIN A1C Q6M 9/30/2017 MEDICARE YEARLY EXAM 3/28/2018 FOBT Q 1 YEAR AGE 50-75 6/15/2018 Influenza Age 5 to Adult 8/1/2018 LIPID PANEL Q1 4/4/2019 Allergies as of 7/19/2018  Review Complete On: 7/19/2018 By: Niels Sanz NP Severity Noted Reaction Type Reactions Vancomycin High 08/17/2012   Side Effect Itching Ampicillin  06/11/2010    Itching appt 05/20/2020   Bactrim [Sulfamethoxazole-trimethoprim]  06/11/2010    Unknown (comments) Blueberry  03/31/2017    Swelling Causes throat swelling Ciprofloxacin  06/11/2010    Itching Codeine  06/11/2010    Other (comments) Jumpy feeling Crestor [Rosuvastatin]  06/11/2010    Itching Darvocet A500 [Propoxyphene N-acetaminophen]  06/11/2010    Itching Demerol [Meperidine]  06/11/2010    Itching Levaquin [Levofloxacin]  05/20/2014    Itching Lipitor [Atorvastatin]  06/11/2010    Myalgia Magnesium Oxide  06/28/2013    Itching  
 nausea Minocin [Minocycline]  06/11/2010    Unknown (comments) Pcn [Penicillins]  06/11/2010    Itching Pravachol [Pravastatin]  06/11/2010    Swelling Swelling in mouth Shellfish Derived  06/07/2017    Unknown (comments) Sulfa (Sulfonamide Antibiotics)  06/11/2010    Itching Ultracet [Tramadol-acetaminophen]  06/11/2010    Itching Vicodin [Hydrocodone-acetaminophen]  06/11/2010    Unknown (comments) Vytorin 10-10 [Ezetimibe-simvastatin]  06/11/2010    Myalgia Percodan [Oxycodone Hcl-oxycodone-asa] Low 06/11/2010   Side Effect Itching Current Immunizations  Reviewed on 5/2/2017 No immunizations on file. Not reviewed this visit You Were Diagnosed With   
  
 Codes Comments SOB (shortness of breath)    -  Primary ICD-10-CM: R06.02 
ICD-9-CM: 786.05   
 Edema, unspecified type     ICD-10-CM: R60.9 ICD-9-CM: 782.3 Nonischemic cardiomyopathy ([facility]     ICD-10-CM: I42.8 ICD-9-CM: 425.4 Dyslipidemia     ICD-10-CM: E78.5 ICD-9-CM: 272.4 Benign hypertensive heart disease with systolic CHF, NYHA class 2 (HCC)     ICD-10-CM: I11.0, I50.20 ICD-9-CM: 402.11, 428.20, 428.0 Vitals BP Pulse Height(growth percentile) SpO2 OB Status Smoking Status 140/74 91 5' 7\" (1.702 m) 99% Hysterectomy Never Smoker Vitals History Preferred Pharmacy Pharmacy Name Phone [address] [place] [address] [phone] Your Updated Medication List  
  
   
This list is accurate as of 7/19/18 12:30 PM.  Always use your most recent med list.  
  
  
  
  
 baclofen 10 mg tablet Commonly known as:  LIORESAL 10 mg daily. carvedilol 6.25 mg tablet Commonly known as:  COREG  
  
 cholecalciferol (VITAMIN D3) 5,000 unit Tab tablet Commonly known as:  VITAMIN D3 Take  by mouth daily. famotidine 20 mg tablet Commonly known as:  PEPCID Take 1 Tab by mouth nightly. ferrous sulfate 325 mg (65 mg iron) tablet Take 1 Tab by mouth two (2) times daily (with meals). folic acid 1 mg tablet Commonly known as:  Google Take 1 Tab by mouth daily. furosemide 40 mg tablet Commonly known as:  LASIX  
1 tab daily  Indications: Edema * gabapentin 600 mg tablet Commonly known as:  NEURONTIN Take 600 mg by mouth two (2) times a day. * gabapentin 300 mg capsule Commonly known as:  NEURONTIN Take 2 Caps by mouth two (2) times a day. Indications: NEUROPATHIC PAIN  
  
 insulin glargine 100 unit/mL injection Commonly known as:  LANTUS U-100 INSULIN  
5 Units by SubCUTAneous route nightly. Indications: type 2 diabetes mellitus  
  
 insulin lispro 100 unit/mL injection Commonly known as:  HUMALOG See sliding scale JANUVIA 50 mg tablet Generic drug:  SITagliptin Take 50 mg by mouth daily. Lactobacillus Acidoph & Bulgar 1 million cell Tab tablet Commonly known as:  Matilde Coffin Take 1 Tab by mouth two (2) times a day. potassium chloride 20 mEq tablet Commonly known as:  K-DUR, KLOR-CON Take 1 Tab by mouth two (2) times a day. pravastatin 40 mg tablet Commonly known as:  PRAVACHOL Take 40 mg by mouth nightly. Indications: DYSLIPIDEMIA  
  
 trimethoprim-sulfamethoxazole 160-800 mg per tablet Commonly known as:  BACTRIM DS, SEPTRA DS  
 Take 1 Tab by mouth two (2) times a day. * Notice: This list has 2 medication(s) that are the same as other medications prescribed for you. Read the directions carefully, and ask your doctor or other care provider to review them with you. We Performed the Following AMB POC EKG ROUTINE W/ 12 LEADS, INTER & REP [54599 CPT(R)] To-Do List   
 07/19/2018 Lab:  CBC WITH AUTOMATED DIFF Around 07/19/2018 Lab:  METABOLIC PANEL, BASIC Around 07/19/2018 Lab:  NT-PRO BNP Patient Instructions CBC, BMP, NT pro-BNP today Lasix 80mg daily for 2 day and then back to 60mg once a day All other medications to remain the same Weigh daily and record Limit sodium intake to 2000mg per day Limit fluid intake to no more than  6, eight ounce glasses of any type of fluids per day (total of 48 ounces per day) Call if you notice sudden or progressive weight gain (3-5 pounds in 2-3 days), increasing shortness of breath, abdominal bloating, increasing lower extremity edema, inability to lie flat or on your normal number of pillows, having to sit up to catch your breath, fatigue, increased somnolence (sleeping more), poor appetite Please provide this summary of care documentation to your next provider. Your primary care clinician is listed as Alesia Thompson. If you have any questions after today's visit, please call [phone].

## 2021-08-03 PROBLEM — D64.9 ANEMIA: Status: RESOLVED | Noted: 2017-06-06 | Resolved: 2021-08-03

## 2021-08-31 ENCOUNTER — HOSPITAL ENCOUNTER (EMERGENCY)
Age: 71
Discharge: HOME | End: 2021-08-31
Attending: EMERGENCY MEDICINE | Admitting: EMERGENCY MEDICINE
Payer: MEDICARE

## 2021-08-31 VITALS
DIASTOLIC BLOOD PRESSURE: 82 MMHG | OXYGEN SATURATION: 100 % | RESPIRATION RATE: 16 BRPM | HEIGHT: 67 IN | TEMPERATURE: 97.5 F | HEART RATE: 88 BPM | WEIGHT: 242 LBS | SYSTOLIC BLOOD PRESSURE: 149 MMHG | BODY MASS INDEX: 37.98 KG/M2

## 2021-08-31 DIAGNOSIS — K62.5 RECTAL BLEEDING: Primary | ICD-10-CM

## 2021-08-31 LAB
ALBUMIN SERPL-MCNC: 3.6 G/DL (ref 3.4–5)
ALBUMIN/GLOB SERPL: 0.9 {RATIO} (ref 0.8–1.7)
ALP SERPL-CCNC: 94 U/L (ref 45–117)
ALT SERPL-CCNC: 32 U/L (ref 13–56)
ANION GAP SERPL CALC-SCNC: 5 MMOL/L (ref 3–18)
APTT PPP: <20 SEC (ref 23–36.4)
AST SERPL-CCNC: 26 U/L (ref 10–38)
BASOPHILS # BLD: 0 K/UL (ref 0–0.1)
BASOPHILS NFR BLD: 0 % (ref 0–2)
BILIRUB SERPL-MCNC: 0.7 MG/DL (ref 0.2–1)
BUN SERPL-MCNC: 30 MG/DL (ref 7–18)
BUN/CREAT SERPL: 16 (ref 12–20)
CALCIUM SERPL-MCNC: 9 MG/DL (ref 8.5–10.1)
CHLORIDE SERPL-SCNC: 108 MMOL/L (ref 100–111)
CO2 SERPL-SCNC: 28 MMOL/L (ref 21–32)
CREAT SERPL-MCNC: 1.92 MG/DL (ref 0.6–1.3)
DIFFERENTIAL METHOD BLD: ABNORMAL
EOSINOPHIL # BLD: 0.2 K/UL (ref 0–0.4)
EOSINOPHIL NFR BLD: 2 % (ref 0–5)
ERYTHROCYTE [DISTWIDTH] IN BLOOD BY AUTOMATED COUNT: 14.4 % (ref 11.6–14.5)
GLOBULIN SER CALC-MCNC: 3.8 G/DL (ref 2–4)
GLUCOSE SERPL-MCNC: 96 MG/DL (ref 74–99)
HCT VFR BLD AUTO: 34.9 % (ref 35–45)
HGB BLD-MCNC: 11.9 G/DL (ref 12–16)
INR PPP: 1 (ref 0.8–1.2)
LYMPHOCYTES # BLD: 1.7 K/UL (ref 0.9–3.6)
LYMPHOCYTES NFR BLD: 25 % (ref 21–52)
MCH RBC QN AUTO: 27.7 PG (ref 24–34)
MCHC RBC AUTO-ENTMCNC: 34.1 G/DL (ref 31–37)
MCV RBC AUTO: 81.4 FL (ref 78–100)
MONOCYTES # BLD: 0.5 K/UL (ref 0.05–1.2)
MONOCYTES NFR BLD: 7 % (ref 3–10)
NEUTS SEG # BLD: 4.5 K/UL (ref 1.8–8)
NEUTS SEG NFR BLD: 66 % (ref 40–73)
PLATELET # BLD AUTO: 185 K/UL (ref 135–420)
PMV BLD AUTO: 10.7 FL (ref 9.2–11.8)
POTASSIUM SERPL-SCNC: 3.9 MMOL/L (ref 3.5–5.5)
PROT SERPL-MCNC: 7.4 G/DL (ref 6.4–8.2)
PROTHROMBIN TIME: 13.1 SEC (ref 11.5–15.2)
RBC # BLD AUTO: 4.29 M/UL (ref 4.2–5.3)
SODIUM SERPL-SCNC: 141 MMOL/L (ref 136–145)
WBC # BLD AUTO: 6.9 K/UL (ref 4.6–13.2)

## 2021-08-31 PROCEDURE — 99282 EMERGENCY DEPT VISIT SF MDM: CPT

## 2021-08-31 PROCEDURE — 85025 COMPLETE CBC W/AUTO DIFF WBC: CPT

## 2021-08-31 PROCEDURE — 85610 PROTHROMBIN TIME: CPT

## 2021-08-31 PROCEDURE — 80053 COMPREHEN METABOLIC PANEL: CPT

## 2021-08-31 PROCEDURE — 85730 THROMBOPLASTIN TIME PARTIAL: CPT

## 2021-08-31 NOTE — ED PROVIDER NOTES
EMERGENCY DEPARTMENT HISTORY AND PHYSICAL EXAM    1:50 PM      Date: 8/31/2021  Patient Name: Maggie Davis    History of Presenting Illness     No chief complaint on file. History Provided By: patient    Additional History (Context): Maggie aDvis is a 70 y.o. female presents with abdominal pain this morning while wiping noted blood on the toilet paper after 2 or 3 wipes, noted blood in the stool with a large clot. No lightheadedness chest pain syncope. No abdominal pain anywhere no vomiting. Last colonoscopy was 12 years ago. Complex medical history had a fall with a what sounds like spinal hematoma leaving her quadriplegic and confined to an electric wheelchair. Pain at the time my examination is 0. PCP: Ena Mann MD    Chief Complaint:   Duration:    Timing:    Location:   Quality:   Severity:   Modifying Factors:   Associated Symptoms:       Current Outpatient Medications   Medication Sig Dispense Refill    ciprofloxacin HCl (CIPRO) 500 mg tablet Take 1 Tablet by mouth two (2) times a day. 10 Tablet 0    cefdinir (OMNICEF) 300 mg capsule Take 1 Capsule by mouth two (2) times a day. 14 Capsule 0    azithromycin (ZITHROMAX) 250 mg tablet       hydrOXYzine HCL (ATARAX) 25 mg tablet TAKE 1 TABLET BY MOUTH EVERY DAY      metOLazone (ZAROXOLYN) 2.5 mg tablet Take 3 times a week for 1 week, then 2 times a week for second week, then once weekly thereafter. 10 Tab 0    furosemide (LASIX) 40 mg tablet TAKE 1 AND 1/2 TABLETS BY MOUTH DAILY 135 Tab 3    diazePAM (Valium) 10 mg tablet Take 1 Tab by mouth every six (6) hours as needed for Anxiety. Max Daily Amount: 40 mg. 12 Tab 1    Entresto  mg tablet TAKE 1 TABLET BY MOUTH TWICE A  Tab 3    carvediloL (COREG) 6.25 mg tablet TAKE 1 TABLET BY MOUTH TWICE A  Tab 3    VITAMIN D3-VITAMIN K2, MK4, PO Take 1 Tab by mouth daily.       cyanocobalamin/folic acid (vitamin C07-GHOHA acid) 1,218-174 mcg lozg Take 2 Tabs by mouth daily.  ZINC GLUCONATE PO Take 25 mg by mouth daily.  IODINE PO Take 10 mg by mouth daily.  magnesium hydroxide (Camara Milk of Magnesia) 400 mg/5 mL suspension Take 5 mL by mouth as needed for Constipation.  insulin glargine (LANTUS U-100 INSULIN) 100 unit/mL injection 10 Units by SubCUTAneous route nightly. Indications: type 2 diabetes mellitus 10 Vial 0    exenatide microspheres (BYDUREON) 2 mg serr 2 mg by SubCUTAneous route every seven (7) days.  multivitamin (ONE A DAY) tablet Take 1 Tab by mouth daily.  metFORMIN ER (GLUCOPHAGE XR) 500 mg tablet Take 500 mg by mouth two (2) times daily (with meals).  cholecalciferol, VITAMIN D3, (VITAMIN D3) 5,000 unit tab tablet Take 5,000 Units by mouth daily. take 1 tab daily of vitamin D3, 5000 units      gabapentin (NEURONTIN) 300 mg capsule Take 2 Caps by mouth two (2) times a day. Indications: NEUROPATHIC PAIN 100 Cap 0       Past History     Past Medical History:  Past Medical History:   Diagnosis Date    Acute paraplegia ([facility] [address] 4/20/2017    Benign hypertensive heart disease with systolic CHF, NYHA class 2 ([facility] [address] 9/5/2012    Biventricular implantable cardioverter-defibrillator in situ 04/28/2005    Upgraded to BiV AICD; gen change 4/2008; pocket revision 10/2009; Abdominal - done on 8/22/2012 by Dr. Hosea Serrano Cardiac cath 08/15/1996    Patent coronaries. Elev LVEDP. EF 50-55%.  Cardiac echocardiogram 06/23/2015    Ltd study. EF 45-50%. Mild, diffuse hypk. Severe apical hypk. No mass or thrombus was clearly identified, although imaging was suboptimal.      Cardiac nuclear imaging test 06/19/2015    Fixed distal apical, distal septal defect more likely due to RV pacing than prior infarct. No ischemia. EF 46%. RWMA c/w RV pacing. Nondiagnostic EKG on pharm stress test.      Cardiovascular lower extremity venous duplex 09/04/2012    Acute, non-occlusive DVT in CFV on right. No DVT on left.   No superficial thrombosis bilaterally.  Cardiovascular upper extremity venous duplex 08/27/2012    DVT in axillary vein on left. Left subclavian was not visualized.     Chronic anemia 9/5/2012    Chronic systolic heart failure (HCC)     Decreased calculated glomerular filtration rate (GFR) 3/30/2017    Calculated GFR equivalent to that of CKD stage 3 = 30-59 ml/min    Diabetic neuropathy associated with type 2 diabetes mellitus ([address] 6/28/2011    Difficult airway for intubation 08/22/2012    see anesthesia airway note    Dyslipidemia 6/28/2011    Gout     History of complete heart block 06/28/2011    History of Coumadin therapy     Anticoagulation for DVT of the LUE; Discontinued on 3/30/2017    History of deep venous thrombosis 9/5/2012    Left upper extremity    History of pyelonephritis 3/30/2017    Left bundle branch block (LBBB) on electrocardiogram 06/28/2011    Nonischemic cardiomyopathy ([address] 6/28/2011    Obesity (BMI 35.0-39.9 without comorbidity) 3/13/2017    Obstructive sleep apnea on CPAP 2/7/2012    Psoas abscess, right ([address] 4/20/2017    Psoas hematoma, right, secondary to anticoagulant therapy 3/30/2017    Type 2 diabetes mellitus with diabetic neuropathy ([address] 6/28/2011       Past Surgical History:  Past Surgical History:   Procedure Laterality Date    HX CARPAL TUNNEL RELEASE  4/07    right     HX CHOLECYSTECTOMY  1994    HX HYSTERECTOMY  1973    HX OTHER SURGICAL  6/11/2012    AICD revision    HX PACEMAKER  4/28/2005    [employer] AICD    PA REMVL PERM PM PLS GEN W/REPL PLSE GEN 2 LEAD SYS N/A 2/26/2019    REMOVE & REPLACE PPM GEN DUAL LEAD performed by Jose Solis MD at [facility]       Family History:  Family History   Problem Relation Age of Onset    Cancer Father         Leukemia       Social History:  Social History     Tobacco Use    Smoking status: Never Smoker    Smokeless tobacco: Never Used   Vaping Use    Vaping Use: Never used   Substance Use Topics    Alcohol use: Not Currently    Drug use: No       Allergies: Allergies   Allergen Reactions    Latex Itching    Vancomycin Itching    Ampicillin Itching    Bactrim [Sulfamethoxazole-Trimethoprim] Itching    Blueberry Swelling     Causes throat swelling    Ciprofloxacin Itching    Codeine Other (comments)     Jumpy feeling    Crestor [Rosuvastatin] Itching    Darvocet A500 [Propoxyphene N-Acetaminophen] Itching    Demerol [Meperidine] Itching    Levaquin [Levofloxacin] Itching    Lipitor [Atorvastatin] Myalgia    Magnesium Oxide Itching     nausea    Minocin [Minocycline] Itching    Pcn [Penicillins] Itching    Pravachol [Pravastatin] Swelling     Swelling in mouth. Not allergic per patient, takes at home    Shellfish Derived Swelling     itching    Sulfa (Sulfonamide Antibiotics) Itching    Ultracet [Tramadol-Acetaminophen] Itching    Vicodin [Hydrocodone-Acetaminophen] Itching    Vytorin 10-10 [Ezetimibe-Simvastatin] Myalgia    Percodan [Oxycodone Hcl-Oxycodone-Asa] Itching         Review of Systems     Review of Systems   Constitutional: Negative for diaphoresis and fever. HENT: Negative for congestion and sore throat. Eyes: Negative for pain and itching. Respiratory: Negative for cough and shortness of breath. Cardiovascular: Negative for chest pain and palpitations. Gastrointestinal: Positive for blood in stool. Negative for abdominal pain and diarrhea. Endocrine: Negative for polydipsia and polyuria. Genitourinary: Negative for dysuria and hematuria. Musculoskeletal: Negative for arthralgias and myalgias. Skin: Negative for rash and wound. Neurological: Negative for seizures and syncope. Hematological: Does not bruise/bleed easily. Psychiatric/Behavioral: Negative for agitation and hallucinations. Physical Exam       No data found. Physical Exam  Vitals and nursing note reviewed. Constitutional:       General: She is not in acute distress. Appearance: Normal appearance. She is well-developed. She is obese. She is not ill-appearing. Comments: Seated in electric wheelchair   HENT:      Head: Normocephalic and atraumatic. Eyes:      General: No scleral icterus. Conjunctiva/sclera: Conjunctivae normal.   Neck:      Vascular: No JVD. Cardiovascular:      Rate and Rhythm: Normal rate and regular rhythm. Heart sounds: Normal heart sounds. Comments: 4 intact extremity pulses  Pulmonary:      Effort: Pulmonary effort is normal.      Breath sounds: Normal breath sounds. Abdominal:      Palpations: Abdomen is soft. There is no mass. Tenderness: There is no abdominal tenderness. Musculoskeletal:         General: Normal range of motion. Cervical back: Normal range of motion and neck supple. Lymphadenopathy:      Cervical: No cervical adenopathy. Skin:     General: Skin is warm and dry. Neurological:      Mental Status: She is alert. Diagnostic Study Results   Labs -  No results found for this or any previous visit (from the past 12 hour(s)). Radiologic Studies -   No orders to display     No results found. Medications ordered:   Medications - No data to display      Medical Decision Making   Initial Medical Decision Making and DDx: We will assist patient to bed and perform rectal exam.  Thorough discussion with the patient, plan for H&H evaluate for need for transfusion or coagulopathy, if remains stable plan will be for conservative management as outpatient and elective diagnostic colonoscopy with proper prep. ED Course: Progress Notes, Reevaluation, and Consults:  ED Course as of Sep 17 0739   Tue Aug 31, 2021   1412 Nurse Maikel Morrison as chaperone, rectal exam, few soft noninflamed hemorrhoids, digital rectal exam good rectal tone no stool or blood in the vault    [CB]   65 Was able to establish a 22-gauge in the left hand but unable to draw blood after multiple sticks.   I started right external jugular line with 20-gauge diffuse X. Turned over to tech to draw labs    [CB]      ED Course User Index  [CB] Paul Rodriguez MD     Dispo, no need for transfusion no alarming levels of GI bleeding, suitable for discharge follow-up with GI return to emergency department for any new or alarming symptoms    I am the first provider for this patient. I reviewed the vital signs, available nursing notes, past medical history, past surgical history, family history and social history. No data found. Vital Signs-Reviewed the patient's vital signs. Pulse Oximetry Analysis, Cardiac Monitor, 12 lead ekg:     Interpreted by the EP. Records Reviewed: Nursing notes reviewed (Time of Review: 1:50 PM)    Procedures:   Critical Care Time:   Aspirin: (was aspirin given for stroke?)    Diagnosis     Clinical Impression: No diagnosis found. Disposition:       Follow-up Information    None          Patient's Medications   Start Taking    No medications on file   Continue Taking    AZITHROMYCIN (ZITHROMAX) 250 MG TABLET        CARVEDILOL (COREG) 6.25 MG TABLET    TAKE 1 TABLET BY MOUTH TWICE A DAY    CEFDINIR (OMNICEF) 300 MG CAPSULE    Take 1 Capsule by mouth two (2) times a day. CHOLECALCIFEROL, VITAMIN D3, (VITAMIN D3) 5,000 UNIT TAB TABLET    Take 5,000 Units by mouth daily. take 1 tab daily of vitamin D3, 5000 units    CIPROFLOXACIN HCL (CIPRO) 500 MG TABLET    Take 1 Tablet by mouth two (2) times a day. CYANOCOBALAMIN/FOLIC ACID (VITAMIN X29-ZMEEF ACID) 3,112-097 MCG LOZG    Take 2 Tabs by mouth daily. DIAZEPAM (VALIUM) 10 MG TABLET    Take 1 Tab by mouth every six (6) hours as needed for Anxiety. Max Daily Amount: 40 mg. ENTRESTO  MG TABLET    TAKE 1 TABLET BY MOUTH TWICE A DAY    EXENATIDE MICROSPHERES (BYDUREON) 2 MG SERR    2 mg by SubCUTAneous route every seven (7) days.     FUROSEMIDE (LASIX) 40 MG TABLET    TAKE 1 AND 1/2 TABLETS BY MOUTH DAILY    GABAPENTIN (NEURONTIN) 300 MG CAPSULE Take 2 Caps by mouth two (2) times a day. Indications: NEUROPATHIC PAIN    HYDROXYZINE HCL (ATARAX) 25 MG TABLET    TAKE 1 TABLET BY MOUTH EVERY DAY    INSULIN GLARGINE (LANTUS U-100 INSULIN) 100 UNIT/ML INJECTION    10 Units by SubCUTAneous route nightly. Indications: type 2 diabetes mellitus    IODINE PO    Take 10 mg by mouth daily. MAGNESIUM HYDROXIDE (LOPEZ MILK OF MAGNESIA) 400 MG/5 ML SUSPENSION    Take 5 mL by mouth as needed for Constipation. METFORMIN ER (GLUCOPHAGE XR) 500 MG TABLET    Take 500 mg by mouth two (2) times daily (with meals). METOLAZONE (ZAROXOLYN) 2.5 MG TABLET    Take 3 times a week for 1 week, then 2 times a week for second week, then once weekly thereafter. MULTIVITAMIN (ONE A DAY) TABLET    Take 1 Tab by mouth daily. VITAMIN D3-VITAMIN K2, MK4, PO    Take 1 Tab by mouth daily. ZINC GLUCONATE PO    Take 25 mg by mouth daily.    These Medications have changed    No medications on file   Stop Taking    No medications on file     _______________________________    Notes:    Rasheed David MD using Dragon dictation      _______________________________

## 2021-09-09 ENCOUNTER — CLINICAL SUPPORT (OUTPATIENT)
Dept: CARDIOLOGY CLINIC | Age: 71
End: 2021-09-09

## 2021-09-09 ENCOUNTER — OFFICE VISIT (OUTPATIENT)
Dept: CARDIOLOGY CLINIC | Age: 71
End: 2021-09-09
Payer: MEDICARE

## 2021-09-09 VITALS
HEART RATE: 54 BPM | SYSTOLIC BLOOD PRESSURE: 120 MMHG | DIASTOLIC BLOOD PRESSURE: 64 MMHG | HEIGHT: 67 IN | OXYGEN SATURATION: 99 % | BODY MASS INDEX: 37.9 KG/M2

## 2021-09-09 DIAGNOSIS — I50.22 CHRONIC SYSTOLIC HEART FAILURE (HCC): Primary | ICD-10-CM

## 2021-09-09 DIAGNOSIS — Z95.810 BIVENTRICULAR IMPLANTABLE CARDIOVERTER-DEFIBRILLATOR IN SITU: Primary | ICD-10-CM

## 2021-09-09 DIAGNOSIS — I42.8 NONISCHEMIC CARDIOMYOPATHY (HCC): ICD-10-CM

## 2021-09-09 PROCEDURE — 99214 OFFICE O/P EST MOD 30 MIN: CPT | Performed by: INTERNAL MEDICINE

## 2021-09-09 PROCEDURE — 1090F PRES/ABSN URINE INCON ASSESS: CPT | Performed by: INTERNAL MEDICINE

## 2021-09-09 PROCEDURE — G8536 NO DOC ELDER MAL SCRN: HCPCS | Performed by: INTERNAL MEDICINE

## 2021-09-09 PROCEDURE — 93280 PM DEVICE PROGR EVAL DUAL: CPT | Performed by: INTERNAL MEDICINE

## 2021-09-09 PROCEDURE — G8400 PT W/DXA NO RESULTS DOC: HCPCS | Performed by: INTERNAL MEDICINE

## 2021-09-09 PROCEDURE — G8417 CALC BMI ABV UP PARAM F/U: HCPCS | Performed by: INTERNAL MEDICINE

## 2021-09-09 PROCEDURE — 3017F COLORECTAL CA SCREEN DOC REV: CPT | Performed by: INTERNAL MEDICINE

## 2021-09-09 PROCEDURE — G8427 DOCREV CUR MEDS BY ELIG CLIN: HCPCS | Performed by: INTERNAL MEDICINE

## 2021-09-09 PROCEDURE — G8432 DEP SCR NOT DOC, RNG: HCPCS | Performed by: INTERNAL MEDICINE

## 2021-09-09 PROCEDURE — 93000 ELECTROCARDIOGRAM COMPLETE: CPT | Performed by: INTERNAL MEDICINE

## 2021-09-09 PROCEDURE — 1101F PT FALLS ASSESS-DOCD LE1/YR: CPT | Performed by: INTERNAL MEDICINE

## 2021-09-09 RX ORDER — METOLAZONE 2.5 MG/1
TABLET ORAL
Qty: 8 TABLET | Refills: 3 | Status: SHIPPED | OUTPATIENT
Start: 2021-09-09 | End: 2022-02-03

## 2021-09-09 NOTE — PROGRESS NOTES
DATE OF PROCEDURE:  2018 

 

PREOPERATIVE DIAGNOSIS:  Right distal ureteral stone with obstruction.



POSTOPERATIVE DIAGNOSIS:  Right distal ureteral stone with obstruction.  



PROCEDURES PERFORMED:  

1. Cystoscopy.

2. Right retrograde pyelogram.

3. Right ureteroscopy with stone extraction.

4. Placement of right ureteral stent.  



ANESTHESIA:  General. 



ESTIMATED BLOOD LOSS:  Minimal. 



INDICATIONS:  Ms. Shruthi Fiore is a 28-year-old woman with a history of 

right flank pain, found to have a 1 cm right distal ureteral stone.  She 

now presents for definite surgical management of that problem:  



PROCEDURE IN DETAIL:  The patient was brought into the operating room and 

placed in the supine position.  After initiation of general anesthesia, she 

was prepped and draped in usual  sterile fashion.  Cystourethroscopy was 

performed using 20-Montserratian cystocope.  The anterior and posterior regions 

were noted to be normal.  Bladder was entered without difficulty.  Upon 

entrance into the bladder, the ureteral orifices were in normal anatomical 

position.  The stone could be seen  behind the right ureteral 

orifice.  The left ureteral orifice was completely normal.  There were no 

mucosal lesions identified.  Using a 5-Montserratian open tipped catheter, a right 

retrograde pyelogram was performed.  This revealed a stone fitting 

comfortably in the distal right ureter just above the ureteral orifice.  

There was minimal hydronephrosis noted behind this.  An Amplatz wire was 

then placed under fluoroscopic guidance up into the right renal pelvis.  

The ureteral orifice dilated with two 18-Montserratian.  Rigid ureteroscopy was 

then performed.  The previously described stone was seen in the previously 

described location and this was grasped with a basket and removed in its 

entirety.  It was sent to pathology for microscopic analysis.  Repeat 

ureteroscopy up to the ureteropelvic junction revealed no other 

calcifications or lesions.  There was mild dilation noted.  The 

ureteroscope was then removed and a 6-Montserratian double pigtail stent for her 

height was placed such that one coil was in the renal pelvis and the 

subsequent coil was in her bladder.  The string was allowed to exit through 

the ureteral meatus.  The bladder was then drained in its entirety, and the 

scope and the sheath were removed.  The patient was returned to the supine 

position, and anesthesia was reversed.  She was transferred to a bed and 

taken to the postanesthesia care unit in good condition.  Of note, the 

needle and the instrument counts were correct at the conclusion of the 

case.  













DD:  2018 09:57

DT:  2018 12:51

Job#:  [id number] GH Shirin Washington    f/u HFmrEF, ICD, preop    HPI    Shirin Washington is a 70 y.o. -American female with history of a dilated cardiomyopathy with mild left ventricular dysfunction and an ejection fraction in the 40 to 45% range with widely patent coronary arteries and a cardiac catheterization back in August 1996. An echocardiogram completed on May 5, 2017 suggested an ejection fraction of 50%.  She did have a biventricular AICD placed but due to trauma and infection in that area she had that removed and now she has a biventricular pacemaker with pulse generator residing in her left upper quadrant placed back in October 2012.     Unfortunately, she fell at home and traumatized her back developing an infection of her right Psoas muscle hematoma with development of epidural abscess with neurologic compromise resulting in paraplegia back in April 2017.     She had her pacemaker generator changed by Dr. Marlo Christian on February 26, 2019. Mayda Hylton should be noted that she had an infected right foot and was on antibiotics for for some time before the generator change and she is now off antibiotics and foot is now healing by secondary intention and apparently is doing well according to the patient.      She has had some problems with chronic heart failure for which we have tried to adjust her diuretics. Her last echocardiogram was completed on August 17, 2020 demonstrated a left ventricular ejection fraction of 25 to 30% which was slightly improved from past studies and suggested some benefit from her ongoing Entresto therapy.  She does have stage III chronic kidney disease which makes more aggressive diuresis a little challenging.       Past Medical History:   Diagnosis Date    Acute paraplegia ([facility] 4/20/2017    Benign hypertensive heart disease with systolic CHF, NYHA class 2 ([address] 9/5/2012    Biventricular implantable cardioverter-defibrillator in situ 04/28/2005    Upgraded to BiV AICD; gen change 4/2008; pocket revision 10/2009; Abdominal - done on 8/22/2012 by Dr. Merlinda Darter Cardiac cath 08/15/1996    Patent coronaries. Elev LVEDP. EF 50-55%.  Cardiac echocardiogram 06/23/2015    Ltd study. EF 45-50%. Mild, diffuse hypk. Severe apical hypk. No mass or thrombus was clearly identified, although imaging was suboptimal.      Cardiac nuclear imaging test 06/19/2015    Fixed distal apical, distal septal defect more likely due to RV pacing than prior infarct. No ischemia. EF 46%. RWMA c/w RV pacing. Nondiagnostic EKG on pharm stress test.      Cardiovascular lower extremity venous duplex 09/04/2012    Acute, non-occlusive DVT in CFV on right. No DVT on left. No superficial thrombosis bilaterally.  Cardiovascular upper extremity venous duplex 08/27/2012    DVT in axillary vein on left. Left subclavian was not visualized.     Chronic anemia 9/5/2012    Chronic systolic heart failure (HCC)     Decreased calculated glomerular filtration rate (GFR) 3/30/2017    Calculated GFR equivalent to that of CKD stage 3 = 30-59 ml/min    Diabetic neuropathy associated with type 2 diabetes mellitus ([address] 6/28/2011    Difficult airway for intubation 08/22/2012    see anesthesia airway note    Dyslipidemia 6/28/2011    Gout     History of complete heart block 06/28/2011    History of Coumadin therapy     Anticoagulation for DVT of the LUE; Discontinued on 3/30/2017    History of deep venous thrombosis 9/5/2012    Left upper extremity    History of pyelonephritis 3/30/2017    Left bundle branch block (LBBB) on electrocardiogram 06/28/2011    Nonischemic cardiomyopathy ([address] 6/28/2011    Obesity (BMI 35.0-39.9 without comorbidity) 3/13/2017    Obstructive sleep apnea on CPAP 2/7/2012    Psoas abscess, right ([address] 4/20/2017    Psoas hematoma, right, secondary to anticoagulant therapy 3/30/2017    Type 2 diabetes mellitus with diabetic neuropathy ([address] 6/28/2011       Past Surgical History:   Procedure Laterality Date    HX CARPAL TUNNEL RELEASE  4/07    right     HX CHOLECYSTECTOMY  1994    HX HYSTERECTOMY  1973    HX OTHER SURGICAL  6/11/2012    AICD revision    HX PACEMAKER  4/28/2005    [employer] AICD    [place] REMVL PERM PM PLS GEN W/REPL PLSE GEN 2 LEAD SYS N/A 2/26/2019    REMOVE & REPLACE PPM GEN DUAL LEAD performed by Brenda Alonzo MD at [facility]       Current Outpatient Medications   Medication Sig Dispense Refill    hydrOXYzine HCL (ATARAX) 25 mg tablet TAKE 1 TABLET BY MOUTH EVERY DAY      furosemide (LASIX) 40 mg tablet TAKE 1 AND 1/2 TABLETS BY MOUTH DAILY 135 Tab 3    Entresto  mg tablet TAKE 1 TABLET BY MOUTH TWICE A  Tab 3    carvediloL (COREG) 6.25 mg tablet TAKE 1 TABLET BY MOUTH TWICE A  Tab 3    VITAMIN D3-VITAMIN K2, MK4, PO Take 1 Tab by mouth daily.  cyanocobalamin/folic acid (vitamin Q16-SWCVU acid) 2,578-686 mcg lozg Take 2 Tabs by mouth daily.  ZINC GLUCONATE PO Take 25 mg by mouth daily.  magnesium hydroxide (E-Sign Milk of Magnesia) 400 mg/5 mL suspension Take 5 mL by mouth as needed for Constipation.  insulin glargine (LANTUS U-100 INSULIN) 100 unit/mL injection 10 Units by SubCUTAneous route nightly. Indications: type 2 diabetes mellitus 10 Vial 0    exenatide microspheres (BYDUREON) 2 mg serr 2 mg by SubCUTAneous route every seven (7) days.  multivitamin (ONE A DAY) tablet Take 1 Tab by mouth daily.  metFORMIN ER (GLUCOPHAGE XR) 500 mg tablet Take 500 mg by mouth two (2) times daily (with meals).  cholecalciferol, VITAMIN D3, (VITAMIN D3) 5,000 unit tab tablet Take 5,000 Units by mouth daily. take 1 tab daily of vitamin D3, 5000 units      gabapentin (NEURONTIN) 300 mg capsule Take 2 Caps by mouth two (2) times a day.  Indications: NEUROPATHIC PAIN 100 Cap 0       Allergies   Allergen Reactions    Latex Itching    Vancomycin Itching    Ampicillin Itching    Bactrim [Sulfamethoxazole-Trimethoprim] Itching    Blueberry Swelling     Causes throat swelling    Ciprofloxacin Itching    Codeine Other (comments)     Jumpy feeling    Crestor [Rosuvastatin] Itching    Darvocet A500 [Propoxyphene N-Acetaminophen] Itching    Demerol [Meperidine] Itching    Levaquin [Levofloxacin] Itching    Lipitor [Atorvastatin] Myalgia    Magnesium Oxide Itching     nausea    Minocin [Minocycline] Itching    Pcn [Penicillins] Itching    Pravachol [Pravastatin] Swelling     Swelling in mouth. Not allergic per patient, takes at home    Shellfish Derived Swelling     itching    Sulfa (Sulfonamide Antibiotics) Itching    Ultracet [Tramadol-Acetaminophen] Itching    Vicodin [Hydrocodone-Acetaminophen] Itching    Vytorin 10-10 [Ezetimibe-Simvastatin] Myalgia    Percodan [Oxycodone Hcl-Oxycodone-Asa] Itching       Social History     Socioeconomic History    Marital status:      Spouse name: Not on file    Number of children: Not on file    Years of education: Not on file    Highest education level: Not on file   Occupational History    Not on file   Tobacco Use    Smoking status: Never Smoker    Smokeless tobacco: Never Used   Vaping Use    Vaping Use: Never used   Substance and Sexual Activity    Alcohol use: Not Currently    Drug use: No    Sexual activity: Not Currently     Partners: Male   Other Topics Concern    Not on file   Social History Narrative    Not on file     Social Determinants of Health     Financial Resource Strain:     Difficulty of Paying Living Expenses:    Food Insecurity:     Worried About Running Out of Food in the Last Year:     Ran Out of Food in the Last Year:    Transportation Needs:     Lack of Transportation (Medical):      Lack of Transportation (Non-Medical):    Physical Activity:     Days of Exercise per Week:     Minutes of Exercise per Session:    Stress:     Feeling of Stress :    Social Connections:     Frequency of Communication with Friends and Family:     Frequency of Social Gatherings with Friends and Family:     Attends Shinto Services:     Active Member of Clubs or Organizations:     Attends Club or Organization Meetings:     Marital Status:    Intimate Partner Violence:     Fear of Current or Ex-Partner:     Emotionally Abused:     Physically Abused:     Sexually Abused:         FH: n/a    Review of Systems    14 pt Review of Systems is negative unless otherwise mentioned in the HPI. Wt Readings from Last 3 Encounters:   09/02/21 109.8 kg (242 lb)   08/31/21 109.8 kg (242 lb)   04/30/21 107 kg (236 lb)     Temp Readings from Last 3 Encounters:   09/02/21 98.7 °F (37.1 °C)   08/31/21 97.5 °F (36.4 °C)   08/12/21 97.4 °F (36.3 °C)     BP Readings from Last 3 Encounters:   09/09/21 120/64   08/31/21 (!) 149/82   03/15/21 128/70     Pulse Readings from Last 3 Encounters:   09/09/21 (!) 54   08/31/21 88   03/15/21 64       08/17/20   ECHO ADULT COMPLETE 08/17/2020 8/17/2020    Narrative · Contrast used: DEFINITY. · Echo study was technically difficulty. · LV: Estimated LVEF is 25 - 30%. Visually measured ejection fraction. Normal cavity size. Mildly increased wall thickness. Severely reduced   systolic function. Mild (grade 1) left ventricular diastolic dysfunction. · IVC: Moderately elevated central venous pressure (10-15 mmHg); IVC   diameter is larger than 21 mm and collapses more than 50% with   respiration. · PA: Pulmonary arterial systolic pressure is 34 mmHg. Pulmonary   hypertension not suggested by Doppler findings. · LA: Left Atrium volume index is 28 mL/m2. · RV: Dilated right ventricle. Reduced systolic function. · RA: Dilated right atrium.         Signed by: Christie Ansari MD       Physical Exam:    Visit Vitals  /64 (BP 1 Location: Right upper arm, BP Patient Position: Sitting, BP Cuff Size: Large adult)   Pulse (!) 54   Ht 5' 7\" (1.702 m)   LMP  (LMP Unknown)   SpO2 99%   BMI 37.90 kg/m²      Physical Exam  HENT:      Head: Normocephalic and atraumatic. Eyes:      Pupils: Pupils are equal, round, and reactive to light. Cardiovascular:      Rate and Rhythm: Normal rate and regular rhythm. Heart sounds: Normal heart sounds. No murmur heard. No friction rub. No gallop. Comments: No JVD, JVP just above clavicle with pt at 90 degrees  Pulmonary:      Effort: Pulmonary effort is normal. No respiratory distress. Breath sounds: Normal breath sounds. No wheezing or rales. Chest:      Chest wall: No tenderness. Abdominal:      General: Bowel sounds are normal.      Palpations: Abdomen is soft. Musculoskeletal:         General: No tenderness. Right lower le+ Pitting Edema present. Left lower le+ Pitting Edema present. Skin:     General: Skin is warm and dry. Neurological:      Mental Status: She is alert and oriented to person, place, and time. EKG today shows: NSR, normal axis and intervals, no ST segment abnormalities    Lab Results   Component Value Date/Time    Sodium 141 2021 02:50 PM    Potassium 3.9 2021 02:50 PM    Chloride 108 2021 02:50 PM    CO2 28 2021 02:50 PM    Anion gap 5 2021 02:50 PM    Glucose 96 2021 02:50 PM    BUN 30 (H) 2021 02:50 PM    Creatinine 1.92 (H) 2021 02:50 PM    BUN/Creatinine ratio 16 2021 02:50 PM    GFR est AA 31 (L) 2021 02:50 PM    GFR est non-AA 26 (L) 2021 02:50 PM    Calcium 9.0 2021 02:50 PM    Bilirubin, total 0.7 2021 02:50 PM    Alk.  phosphatase 94 2021 02:50 PM    Protein, total 7.4 2021 02:50 PM    Albumin 3.6 2021 02:50 PM    Globulin 3.8 2021 02:50 PM    A-G Ratio 0.9 2021 02:50 PM    ALT (SGPT) 32 2021 02:50 PM    AST (SGOT) 26 2021 02:50 PM     Lab Results   Component Value Date/Time    WBC 6.9 2021 02:50 PM    Hemoglobin, POC 7.8 (L) 2012 09:30 AM    HGB 11.9 (L) 2021 02:50 PM    Hematocrit, POC 23 (L) 08/22/2012 09:30 AM    HCT 34.9 (L) 08/31/2021 02:50 PM    PLATELET 780 78/30/0425 02:50 PM    MCV 81.4 08/31/2021 02:50 PM     Lab Results   Component Value Date/Time    Cholesterol, total 127 08/17/2018 02:40 AM    HDL Cholesterol 42 08/17/2018 02:40 AM    LDL, calculated 61.6 08/17/2018 02:40 AM    VLDL, calculated 23.4 08/17/2018 02:40 AM    Triglyceride 117 08/17/2018 02:40 AM    CHOL/HDL Ratio 3.0 08/17/2018 02:40 AM     Lab Results   Component Value Date/Time    TSH 0.29 (L) 12/14/2019 12:55 PM     Lab Results   Component Value Date/Time    Hemoglobin A1c 5.8 (H) 12/14/2019 01:44 AM         Impression and Plan:  Barb Oar is a 70 y.o. with:    1.) AECHF/ HFmrEF 40-45%, with +LE edema (but JVD normal)  2.) ICD, with h/o infection, device checked today  3.) DM2, well controlled  4.) HTN, at goal  5.) CKD 3, SCr baseline ~1.4  6.) Abn TSH?  7.) H/o recurrent infections  8.) Poor functional status/ Paraplegia    1.) Ok to continue Lasix 60 mg daily, but on \"bad days\" takes 80 mg  2.) Will continue Metolazone 2.5 mg 2x/week, I trailed this back in Feb and she felt it helped her  3.) Has historically not needed K replacement, but will need to keep an eye  4.) RTC 6 months    Patient is low to moderate risk from a cardiac standpoint can proceed for low risk endoscopy. She has no signs or symptoms of cardiac decompensation     Thank you for allowing me to participate in the care of your patient, please do not hesitate to call with questions or concerns. Follow-up and Dispositions    · Return in about 6 months (around 3/9/2022).      [facility],    [facility], DO

## 2021-09-09 NOTE — LETTER
9/9/2021 12:16 PM    Ms. Antoinette Bender was seen in our office on 9/9/2021 for cardiac evaluation. From a cardiac standpoint she is low to moderate risk for upper and lower endoscopy, may proceed without further testing. Please feel free to contact our office if you have any questions regarding this patient.          Sincerely,        Dipika Benavides, DO

## 2021-09-23 NOTE — PROGRESS NOTES
I have personally seen and evaluated the device findings. Interrogation reviewed and I agree with assessment.     Tatiana Perez

## 2021-09-30 ENCOUNTER — HOSPITAL ENCOUNTER (OUTPATIENT)
Dept: PREADMISSION TESTING | Age: 71
Discharge: HOME | End: 2021-09-30
Payer: MEDICARE

## 2021-09-30 ENCOUNTER — TRANSCRIBE ORDER (OUTPATIENT)
Dept: REGISTRATION | Age: 71
End: 2021-09-30

## 2021-09-30 DIAGNOSIS — Z01.812 BLOOD TESTS PRIOR TO TREATMENT OR PROCEDURE: ICD-10-CM

## 2021-09-30 DIAGNOSIS — Z01.812 BLOOD TESTS PRIOR TO TREATMENT OR PROCEDURE: Primary | ICD-10-CM

## 2021-09-30 PROCEDURE — U0003 INFECTIOUS AGENT DETECTION BY NUCLEIC ACID (DNA OR RNA); SEVERE ACUTE RESPIRATORY SYNDROME CORONAVIRUS 2 (SARS-COV-2) (CORONAVIRUS DISEASE [COVID-19]), AMPLIFIED PROBE TECHNIQUE, MAKING USE OF HIGH THROUGHPUT TECHNOLOGIES AS DESCRIBED BY CMS-2020-01-R: HCPCS

## 2021-10-01 LAB — SARS-COV-2, NAA: NOT DETECTED

## 2021-10-01 RX ORDER — NITROFURANTOIN (MACROCRYSTALS) 100 MG/1
CAPSULE ORAL 2 TIMES DAILY
COMMUNITY
End: 2021-10-19

## 2021-10-01 NOTE — PERIOP NOTES
PRE-SURGICAL INSTRUCTIONS        Patient's Name:  Shai Meehan      AQBHH'X Date:  10/1/2021            Covid Testing Date and Time:    Surgery Date:  10/5/2021                1. Do NOT eat or drink anything, including candy, gum, or ice chips after midnight on 10/04/21, unless you have specific instructions from your surgeon or anesthesia provider to do so.  2. You may brush your teeth before coming to the hospital.  3. No smoking 24 hours prior to the day of surgery. 4. No alcohol 24 hours prior to the day of surgery. 5. No recreational drugs for one week prior to the day of surgery. 6. Leave all valuables, including money/purse, at home. 7. Remove all jewelry, nail polish, acrylic nails, and makeup (including mascara); no lotions powders, deodorant, or perfume/cologne/after shave on the skin. 8. Follow instruction for Hibiclens washes and CHG wipes from surgeon's office. 9. Glasses/contact lenses and dentures may be worn to the hospital.  They will be removed prior to surgery. 10. Call your doctor if symptoms of a cold or illness develop within 24-48 hours prior to your surgery. 11.  If you are having an outpatient procedure, please make arrangements for a responsible ADULT TO [address] [place] [address] and stay with you for 24 hours after your surgery. 12. ONE VISITOR in the hospital at this time for outpatient procedures. Exceptions may be made for surgical admissions, per nursing unit guidelines      Special Instructions:      Bring list of CURRENT medications. Bring any pertinent legal medical records. Take bloodpressure medications the morning of surgery with a sip of water: Follow physician instructions about insulin. Follow physician instructions about stopping anticoagulants. On the day of surgery, come in the main entrance of [facility]. Let the  at the desk know you are there for surgery.   A staff member will come escort you to the surgical area on the second floor. If you have any questions or concerns, please do not hesitate to call:     (Prior to the day of surgery) PAT department:  [phone]   (Day of surgery) Pre-Op department:  [phone]    These surgical instructions were reviewed with patient during the PAT phone call.

## 2021-10-04 ENCOUNTER — ANESTHESIA EVENT (OUTPATIENT)
Dept: ENDOSCOPY | Age: 71
End: 2021-10-04
Payer: MEDICARE

## 2021-10-05 ENCOUNTER — HOSPITAL ENCOUNTER (OUTPATIENT)
Age: 71
Setting detail: OUTPATIENT SURGERY
Discharge: HOME | End: 2021-10-05
Attending: INTERNAL MEDICINE | Admitting: INTERNAL MEDICINE
Payer: MEDICARE

## 2021-10-05 ENCOUNTER — ANESTHESIA (OUTPATIENT)
Dept: ENDOSCOPY | Age: 71
End: 2021-10-05
Payer: MEDICARE

## 2021-10-05 VITALS
OXYGEN SATURATION: 100 % | WEIGHT: 236 LBS | DIASTOLIC BLOOD PRESSURE: 77 MMHG | HEART RATE: 82 BPM | HEIGHT: 61 IN | BODY MASS INDEX: 44.56 KG/M2 | TEMPERATURE: 97.7 F | RESPIRATION RATE: 15 BRPM | SYSTOLIC BLOOD PRESSURE: 134 MMHG

## 2021-10-05 LAB
GLUCOSE BLD STRIP.AUTO-MCNC: 125 MG/DL (ref 70–110)
GLUCOSE BLD STRIP.AUTO-MCNC: 152 MG/DL (ref 70–110)

## 2021-10-05 PROCEDURE — 2709999900 HC NON-CHARGEABLE SUPPLY: Performed by: INTERNAL MEDICINE

## 2021-10-05 PROCEDURE — 77030019988 HC FCPS ENDOSC DISP BSC -B: Performed by: INTERNAL MEDICINE

## 2021-10-05 PROCEDURE — 82962 GLUCOSE BLOOD TEST: CPT

## 2021-10-05 PROCEDURE — 77030021593 HC FCPS BIOP ENDOSC BSC -A: Performed by: INTERNAL MEDICINE

## 2021-10-05 PROCEDURE — 76040000019: Performed by: INTERNAL MEDICINE

## 2021-10-05 PROCEDURE — 88305 TISSUE EXAM BY PATHOLOGIST: CPT

## 2021-10-05 PROCEDURE — 77030013992 HC SNR POLYP ENDOSC BSC -B: Performed by: INTERNAL MEDICINE

## 2021-10-05 PROCEDURE — 76060000031 HC ANESTHESIA FIRST 0.5 HR: Performed by: INTERNAL MEDICINE

## 2021-10-05 PROCEDURE — 74011000250 HC RX REV CODE- 250: Performed by: NURSE ANESTHETIST, CERTIFIED REGISTERED

## 2021-10-05 PROCEDURE — 74011250636 HC RX REV CODE- 250/636: Performed by: ANESTHESIOLOGY

## 2021-10-05 PROCEDURE — 74011250636 HC RX REV CODE- 250/636: Performed by: NURSE ANESTHETIST, CERTIFIED REGISTERED

## 2021-10-05 PROCEDURE — 00813 ANES UPR LWR GI NDSC PX: CPT | Performed by: ANESTHESIOLOGY

## 2021-10-05 PROCEDURE — 74011000250 HC RX REV CODE- 250: Performed by: ANESTHESIOLOGY

## 2021-10-05 PROCEDURE — 74011636637 HC RX REV CODE- 636/637: Performed by: NURSE ANESTHETIST, CERTIFIED REGISTERED

## 2021-10-05 PROCEDURE — 99100 ANES PT EXTEME AGE<1 YR&>70: CPT | Performed by: ANESTHESIOLOGY

## 2021-10-05 PROCEDURE — 77030008565 HC TBNG SUC IRR ERBE -B: Performed by: INTERNAL MEDICINE

## 2021-10-05 RX ORDER — MAGNESIUM SULFATE 100 %
4 CRYSTALS MISCELLANEOUS AS NEEDED
Status: DISCONTINUED | OUTPATIENT
Start: 2021-10-05 | End: 2021-10-05 | Stop reason: HOSPADM

## 2021-10-05 RX ORDER — DEXTROSE 50 % IN WATER (D50W) INTRAVENOUS SYRINGE
25-50 AS NEEDED
Status: DISCONTINUED | OUTPATIENT
Start: 2021-10-05 | End: 2021-10-05 | Stop reason: HOSPADM

## 2021-10-05 RX ORDER — PROPOFOL 10 MG/ML
INJECTION, EMULSION INTRAVENOUS AS NEEDED
Status: DISCONTINUED | OUTPATIENT
Start: 2021-10-05 | End: 2021-10-05 | Stop reason: HOSPADM

## 2021-10-05 RX ORDER — LIDOCAINE HYDROCHLORIDE 20 MG/ML
INJECTION, SOLUTION EPIDURAL; INFILTRATION; INTRACAUDAL; PERINEURAL AS NEEDED
Status: DISCONTINUED | OUTPATIENT
Start: 2021-10-05 | End: 2021-10-05 | Stop reason: HOSPADM

## 2021-10-05 RX ORDER — INSULIN LISPRO 100 [IU]/ML
INJECTION, SOLUTION INTRAVENOUS; SUBCUTANEOUS ONCE
Status: DISCONTINUED | OUTPATIENT
Start: 2021-10-05 | End: 2021-10-05 | Stop reason: HOSPADM

## 2021-10-05 RX ORDER — SODIUM CHLORIDE 0.9 % (FLUSH) 0.9 %
5-40 SYRINGE (ML) INJECTION AS NEEDED
Status: DISCONTINUED | OUTPATIENT
Start: 2021-10-05 | End: 2021-10-05 | Stop reason: HOSPADM

## 2021-10-05 RX ORDER — INSULIN LISPRO 100 [IU]/ML
INJECTION, SOLUTION INTRAVENOUS; SUBCUTANEOUS ONCE
Status: COMPLETED | OUTPATIENT
Start: 2021-10-05 | End: 2021-10-05

## 2021-10-05 RX ORDER — SODIUM CHLORIDE, SODIUM LACTATE, POTASSIUM CHLORIDE, CALCIUM CHLORIDE 600; 310; 30; 20 MG/100ML; MG/100ML; MG/100ML; MG/100ML
25 INJECTION, SOLUTION INTRAVENOUS CONTINUOUS
Status: DISCONTINUED | OUTPATIENT
Start: 2021-10-05 | End: 2021-10-05 | Stop reason: HOSPADM

## 2021-10-05 RX ORDER — SODIUM CHLORIDE 0.9 % (FLUSH) 0.9 %
5-40 SYRINGE (ML) INJECTION EVERY 8 HOURS
Status: DISCONTINUED | OUTPATIENT
Start: 2021-10-05 | End: 2021-10-05 | Stop reason: HOSPADM

## 2021-10-05 RX ADMIN — PROPOFOL 25 MG: 10 INJECTION, EMULSION INTRAVENOUS at 10:58

## 2021-10-05 RX ADMIN — PROPOFOL 25 MG: 10 INJECTION, EMULSION INTRAVENOUS at 11:02

## 2021-10-05 RX ADMIN — PROPOFOL 50 MG: 10 INJECTION, EMULSION INTRAVENOUS at 10:54

## 2021-10-05 RX ADMIN — PROPOFOL 25 MG: 10 INJECTION, EMULSION INTRAVENOUS at 10:52

## 2021-10-05 RX ADMIN — LIDOCAINE HYDROCHLORIDE 25 MG: 20 INJECTION, SOLUTION EPIDURAL; INFILTRATION; INTRACAUDAL; PERINEURAL at 10:48

## 2021-10-05 RX ADMIN — INSULIN LISPRO 3 UNITS: 100 INJECTION, SOLUTION INTRAVENOUS; SUBCUTANEOUS at 10:35

## 2021-10-05 RX ADMIN — PROPOFOL 25 MG: 10 INJECTION, EMULSION INTRAVENOUS at 10:51

## 2021-10-05 RX ADMIN — PROPOFOL 25 MG: 10 INJECTION, EMULSION INTRAVENOUS at 10:56

## 2021-10-05 RX ADMIN — PROPOFOL 50 MG: 10 INJECTION, EMULSION INTRAVENOUS at 10:49

## 2021-10-05 RX ADMIN — FAMOTIDINE 20 MG: 10 INJECTION INTRAVENOUS at 10:31

## 2021-10-05 RX ADMIN — PROPOFOL 50 MG: 10 INJECTION, EMULSION INTRAVENOUS at 10:48

## 2021-10-05 RX ADMIN — SODIUM CHLORIDE, SODIUM LACTATE, POTASSIUM CHLORIDE, AND CALCIUM CHLORIDE 25 ML/HR: 600; 310; 30; 20 INJECTION, SOLUTION INTRAVENOUS at 10:30

## 2021-10-05 RX ADMIN — PROPOFOL 25 MG: 10 INJECTION, EMULSION INTRAVENOUS at 11:00

## 2021-10-05 NOTE — ANESTHESIA PREPROCEDURE EVALUATION
Relevant Problems   RESPIRATORY SYSTEM   (+) Dyspnea   (+) History of pyelonephritis   (+) Obstructive sleep apnea on CPAP      CARDIOVASCULAR   (+) Benign hypertensive heart disease with systolic CHF, NYHA class 2 (HCC)   (+) Biventricular cardiac pacemaker in situ   (+) Pacemaker twiddler's syndrome      ENDOCRINE   (+) Severe obesity (BMI 35.0-39. 9) with comorbidity (HCC)   (+) Type 2 diabetes mellitus with diabetic neuropathy (HCC)   (+) Type 2 diabetes with nephropathy (HCC)      HEMATOLOGY   (+) Chronic anemia   (+) Foot osteomyelitis, right (HCC)   (+) Osteomyelitis (HCC)       Anesthetic History     Increased risk of difficult airway (H/O difficult intubation)          Review of Systems / Medical History  Patient summary reviewed and pertinent labs reviewed    Pulmonary        Sleep apnea: CPAP           Neuro/Psych         Neuromuscular disease    Comments: Paraplegia due to spinal cord injury Cardiovascular    Hypertension      CHF: NYHA Classification III  Dysrhythmias       Exercise tolerance: <4 METS: Wheelchair bound  Comments: EF 30 % last year, NYHA  3   GI/Hepatic/Renal     GERD           Endo/Other    Diabetes: well controlled, type 2, using insulin    Morbid obesity     Other Findings            Physical Exam    Airway  Mallampati: II  TM Distance: 4 - 6 cm  Neck ROM: normal range of motion   Mouth opening: Normal     Cardiovascular  Regular rate and rhythm,  S1 and S2 normal,  no murmur, click, rub, or gallop             Dental  No notable dental hx       Pulmonary  Breath sounds clear to auscultation               Abdominal  GI exam deferred       Other Findings            Anesthetic Plan    ASA: 4  Anesthesia type: MAC          Induction: Intravenous  Anesthetic plan and risks discussed with: Patient

## 2021-10-05 NOTE — ANESTHESIA POSTPROCEDURE EVALUATION
Procedure(s):  UPPER ENDOSCOPY with BX  COLONOSCOPY. MAC    Anesthesia Post Evaluation      Multimodal analgesia: multimodal analgesia used between 6 hours prior to anesthesia start to PACU discharge  Patient location during evaluation: PACU  Patient participation: complete - patient participated  Level of consciousness: awake and alert  Pain management: adequate  Airway patency: patent  Anesthetic complications: no  Cardiovascular status: acceptable  Respiratory status: acceptable  Hydration status: acceptable  Post anesthesia nausea and vomiting:  none  Final Post Anesthesia Temperature Assessment:  Normothermia (36.0-37.5 degrees C)      INITIAL Post-op Vital signs:   Vitals Value Taken Time   /62 10/05/21 1121   Temp 36.5 °C (97.7 °F) 10/05/21 1115   Pulse 79 10/05/21 1123   Resp 15 10/05/21 1123   SpO2 99 % 10/05/21 1123   Vitals shown include unvalidated device data.

## 2021-10-05 NOTE — H&P
[email or web address]  [phone]    GASTROENTEROLOGY Pre-Procedure H and P      Impression/Plan:   1. This patient is consented for an EGD and colonoscopy for bleeding/heme pos stool       Chief Complaint: bleeding/heme pos stool         HPI:  Smiley Perez is a 70 y.o. female who is being is having an EGD and colonoscopy bleeding/heme pos stool       PMH:   Past Medical History:   Diagnosis Date    Acute paraplegia ([facility] 4/20/2017    Benign hypertensive heart disease with systolic CHF, NYHA class 2 ([facility] 9/5/2012    Biventricular implantable cardioverter-defibrillator in situ 04/28/2005    Upgraded to BiV AICD; gen change 4/2008; pocket revision 10/2009; Abdominal - done on 8/22/2012 by Dr. Mi Jarquin Cardiac cath 08/15/1996    Patent coronaries. Elev LVEDP. EF 50-55%.  Cardiac echocardiogram 06/23/2015    Ltd study. EF 45-50%. Mild, diffuse hypk. Severe apical hypk. No mass or thrombus was clearly identified, although imaging was suboptimal.      Cardiac nuclear imaging test 06/19/2015    Fixed distal apical, distal septal defect more likely due to RV pacing than prior infarct. No ischemia. EF 46%. RWMA c/w RV pacing. Nondiagnostic EKG on pharm stress test.      Cardiovascular lower extremity venous duplex 09/04/2012    Acute, non-occlusive DVT in CFV on right. No DVT on left. No superficial thrombosis bilaterally.  Cardiovascular upper extremity venous duplex 08/27/2012    DVT in axillary vein on left. Left subclavian was not visualized.     Chronic anemia 9/5/2012    Chronic systolic heart failure (HCC)     Decreased calculated glomerular filtration rate (GFR) 3/30/2017    Calculated GFR equivalent to that of CKD stage 3 = 30-59 ml/min    Diabetic neuropathy associated with type 2 diabetes mellitus ([facility] [address] 6/28/2011    Difficult airway for intubation 08/22/2012    see anesthesia airway note    Dyslipidemia 6/28/2011    Gout     History of complete heart block 06/28/2011    History of Coumadin therapy     Anticoagulation for DVT of the LUE; Discontinued on 3/30/2017    History of deep venous thrombosis 9/5/2012    Left upper extremity    History of pyelonephritis 3/30/2017    Left bundle branch block (LBBB) on electrocardiogram 06/28/2011    [facility] 6/28/2011    Obesity (BMI 35.0-39.9 without comorbidity) 3/13/2017    Obstructive sleep apnea on CPAP 2/7/2012    Psoas abscess, right ([facility] 4/20/2017    Psoas hematoma, right, secondary to anticoagulant therapy 3/30/2017    Type 2 diabetes mellitus with diabetic neuropathy ([facility] 6/28/2011       PSH:   Past Surgical History:   Procedure Laterality Date    HX CARPAL TUNNEL RELEASE  4/07    right     HX CHOLECYSTECTOMY  1994    HX HYSTERECTOMY  1973    HX OTHER SURGICAL  6/11/2012    AICD revision    HX PACEMAKER  4/28/2005    [employer] AICD    PA REMVL PERM PM PLS GEN W/REPL PLSE GEN 2 LEAD SYS N/A 2/26/2019    REMOVE & REPLACE PPM GEN DUAL LEAD performed by Priyanka Walter MD at [facility]       Social HX:   Social History     Socioeconomic History    Marital status:      Spouse name: Not on file    Number of children: Not on file    Years of education: Not on file    Highest education level: Not on file   Occupational History    Not on file   Tobacco Use    Smoking status: Never Smoker    Smokeless tobacco: Never Used   Vaping Use    Vaping Use: Never used   Substance and Sexual Activity    Alcohol use: Never    Drug use: Never    Sexual activity: Not Currently     Partners: Male   Other Topics Concern    Not on file   Social History Narrative    Not on file     Social Determinants of Health     Financial Resource Strain:     Difficulty of Paying Living Expenses:    Food Insecurity:     Worried About Running Out of Food in the Last Year:     [address] in the Last Year:    Transportation Needs:     Lack of Transportation (Medical):      Lack of Transportation (Non-Medical):    Physical Activity:     Days of Exercise per Week:     Minutes of Exercise per Session:    Stress:     Feeling of Stress :    Social Connections:     Frequency of Communication with Friends and Family:     Frequency of Social Gatherings with Friends and Family:     Attends Jew Services:     Active Member of Clubs or Organizations:     Attends Club or Organization Meetings:     Marital Status:    Intimate Partner Violence:     Fear of Current or Ex-Partner:     Emotionally Abused:     Physically Abused:     Sexually Abused:        FHX:   Family History   Problem Relation Age of Onset    Cancer Father         Leukemia       Allergy:   Allergies   Allergen Reactions    Latex Itching    Vancomycin Itching    Ampicillin Itching    Bactrim [Sulfamethoxazole-Trimethoprim] Itching    Blueberry Swelling     Causes throat swelling    Ciprofloxacin Itching    Codeine Other (comments)     Jumpy feeling    Crestor [Rosuvastatin] Itching    Darvocet A500 [Propoxyphene N-Acetaminophen] Itching    Demerol [Meperidine] Itching    Levaquin [Levofloxacin] Itching    Lipitor [Atorvastatin] Myalgia    Magnesium Oxide Itching     nausea    Minocin [Minocycline] Itching    Pcn [Penicillins] Itching    Pravachol [Pravastatin] Swelling     Swelling in mouth. Not allergic per patient, takes at home    Shellfish Derived Swelling     itching    Sulfa (Sulfonamide Antibiotics) Itching    Ultracet [Tramadol-Acetaminophen] Itching    Vicodin [Hydrocodone-Acetaminophen] Itching    Vytorin 10-10 [Ezetimibe-Simvastatin] Myalgia    Percodan [Oxycodone Hcl-Oxycodone-Asa] Itching       Home Medications:     Medications Prior to Admission   Medication Sig    nitrofurantoin (MACRODANTIN) 100 mg capsule Take  by mouth two (2) times a day. LAST DOSE TO COMPLETE WILL BE 10/3/21    diazePAM (Valium) 10 mg tablet Take 1 Tablet by mouth every six (6) hours as needed for Anxiety.  Max Daily Amount: 40 mg.    metOLazone (ZAROXOLYN) 2.5 mg tablet Take 1 tablet by mouth twice weekly.  hydrOXYzine HCL (ATARAX) 25 mg tablet TAKE 1 TABLET BY MOUTH EVERY DAY    furosemide (LASIX) 40 mg tablet TAKE 1 AND 1/2 TABLETS BY MOUTH DAILY    Entresto  mg tablet TAKE 1 TABLET BY MOUTH TWICE A DAY    carvediloL (COREG) 6.25 mg tablet TAKE 1 TABLET BY MOUTH TWICE A DAY    VITAMIN D3-VITAMIN K2, MK4, PO Take 1 Tab by mouth daily.  cyanocobalamin/folic acid (vitamin N70-FXOXQ acid) 3,923-486 mcg lozg Take 2 Tabs by mouth daily.  ZINC GLUCONATE PO Take 25 mg by mouth daily.  magnesium hydroxide (Camara Milk of Magnesia) 400 mg/5 mL suspension Take 5 mL by mouth as needed for Constipation.  insulin glargine (LANTUS U-100 INSULIN) 100 unit/mL injection 10 Units by SubCUTAneous route nightly. Indications: type 2 diabetes mellitus    exenatide microspheres (BYDUREON) 2 mg serr 2 mg by SubCUTAneous route every seven (7) days.  multivitamin (ONE A DAY) tablet Take 1 Tab by mouth daily.  metFORMIN ER (GLUCOPHAGE XR) 500 mg tablet Take 500 mg by mouth two (2) times daily (with meals).  cholecalciferol, VITAMIN D3, (VITAMIN D3) 5,000 unit tab tablet Take 5,000 Units by mouth daily. take 1 tab daily of vitamin D3, 5000 units    gabapentin (NEURONTIN) 300 mg capsule Take 2 Caps by mouth two (2) times a day. Indications: NEUROPATHIC PAIN       Review of Systems:     Constitutional: No fevers, chills, weight loss, fatigue. Skin: No rashes, pruritis, jaundice, ulcerations, erythema. HENT: No headaches, nosebleeds, sinus pressure, rhinorrhea, sore throat. Eyes: No visual changes, blurred vision, eye pain, photophobia, jaundice. Cardiovascular: No chest pain, heart palpitations. Respiratory: No cough, SOB, wheezing, chest discomfort, orthopnea. Gastrointestinal: Neg unless noted otherwise in H&P   Genitourinary: No dysuria, bleeding, discharge, pyuria.    Musculoskeletal: No weakness, arthralgias, wasting. Endo: No sweats. Heme: No bruising, easy bleeding. Allergies: As noted. Neurological: Cranial nerves intact. Alert and oriented. Gait not assessed. Psychiatric:  No anxiety, depression, hallucinations. Visit Vitals  Ht 5' 1\" (1.549 m)   Wt 107 kg (236 lb)   LMP  (LMP Unknown)   BMI 44.59 kg/m²       Physical Assessment:     constitutional: appearance: well developed, well nourished, normal habitus, no deformities, in no acute distress. skin: inspection: no rashes, ulcers, icterus or other lesions; no clubbing or telangiectasias. palpation: no induration or subcutaneos nodules. eyes: inspection: normal conjunctivae and lids; no jaundice pupils: normal  ENMT: mouth: normal oral mucosa,lips and gums; good dentition. oropharynx: normal tongue, hard and soft palate; posterior pharynx without erithema, exudate or lesions. neck: thyroid: normal size, consistency and position; no masses or tenderness. respiratory: effort: normal chest excursion; no intercostal retraction or accessory muscle use. cardiovascular: abdominal aorta: normal size and position; no bruits. palpation: PMI of normal size and position; normal rhythm; no thrill or murmurs. abdominal: abdomen: normal consistency; no tenderness or masses. hernias: no hernias appreciated. liver: normal size and consistency. spleen: not palpable. rectal: hemoccult/guaiac: not performed. musculoskeletal: digits and nails: no clubbing, cyanosis, petechiae or other inflammatory conditions. gait: normal gait and station head and neck: normal range of motion; no pain, crepitation or contracture. spine/ribs/pelvis: normal range of motion; no pain, deformity or contracture. neurologic: cranial nerves: II-XII normal.   psychiatric: judgement/insight: within normal limits. memory: within normal limits for recent and remote events. mood and affect: no evidence of depression, anxiety or agitation.  orientation: oriented to time, space and person. Basic Metabolic Profile   No results for input(s): NA, K, CL, CO2, BUN, GLU, CA, MG, PHOS in the last 72 hours. No lab exists for component: CREAT      CBC w/Diff    No results for input(s): WBC, RBC, HGB, HCT, MCV, MCH, MCHC, RDW, PLT, HGBEXT, HCTEXT, PLTEXT in the last 72 hours. No lab exists for component: MPV No results for input(s): GRANS, LYMPH, EOS, PRO, MYELO, METAS, BLAST in the last 72 hours. No lab exists for component: MONO, BASO     Hepatic Function   No results for input(s): ALB, TP, TBILI, AP, AML, LPSE in the last 72 hours. No lab exists for component: DBILI, GPT, SGOT     Coags   No results for input(s): PTP, INR, APTT, INREXT in the last 72 hours. Elodia Hoang MD  [facility] [address]  Cell: [phone]  Direct pager: [phone]  [email or web address]  [email or web address]

## 2021-10-05 NOTE — DISCHARGE INSTRUCTIONS
Upper GI Endoscopy: What to Expect at [address] [place] [address]  After you have an endoscopy, you will stay at the hospital or clinic for 1 to 2 hours. This will allow the medicine to wear off. You will be able to go home after your doctor or nurse checks to make sure you are not having any problems. You may have to stay overnight if you had treatment during the test. You may have a sore throat for a day or two after the test.  This care sheet gives you a general idea about what to expect after the test.  How can you care for yourself at home? Activity  · Rest as much as you need to after you go home. · You should be able to go back to your usual activities the day after the test.  Diet  · Follow your doctor's directions for eating after the test.  · Drink plenty of fluids (unless your doctor has told you not to). Medications  · If you have a sore throat the day after the test, use an over-the-counter spray to numb your throat. Follow-up care is a key part of your treatment and safety. Be sure to make and go to all appointments, and call your doctor if you are having problems. It's also a good idea to know your test results and keep a list of the medicines you take. When should you call for help? Call 911 anytime you think you may need emergency care. For example, call if:  · You passed out (lost consciousness). · You cough up blood. · You vomit blood or what looks like coffee grounds. · You pass maroon or very bloody stools. Call your doctor now or seek immediate medical care if:  · You have trouble swallowing. · You have belly pain. · Your stools are black and tarlike or have streaks of blood. · You are sick to your stomach or cannot keep fluids down. Watch closely for changes in your health, and be sure to contact your doctor if:  · Your throat still hurts after a day or two. · You do not get better as expected. Where can you learn more?    Go to DealExplorer.be  Enter J454 in the search box to learn more about \"Upper GI Endoscopy: What to Expect at Home. \"   © 9248-3296 Healthwise, Incorporated. Care instructions adapted under license by [employer] (which disclaims liability or warranty for this information). This care instruction is for use with your licensed healthcare professional. If you have questions about a medical condition or this instruction, always ask your healthcare professional. [address]  any warranty or liability for your use of this information. Content Version: 24.8.886380; Current as of: November 14, 2014    Patient Education        Esophagitis: Care Instructions  Your Care Instructions     Esophagitis (say \"ih-sof-uh-JY-tus\") is irritation of the esophagus, the tube that carries food from your throat to your stomach. Acid reflux is the most common cause of this condition. When you have reflux, stomach acid and juices flow upward. This can cause pain or a burning feeling in your chest. You may have a sore throat. It may be hard to swallow. Other causes of this condition include some medicines and supplements. Allergies or an infection can also cause it. Your doctor will ask about your symptoms and past health. He or she might do tests to find the cause of your symptoms. Treatment depends on what is causing the problem. Treatment might include changing your diet or taking medicine to relieve your symptoms. It might also include changing a medicine that is causing your symptoms. If you have reflux, medicine that reduces the stomach acid helps your body heal. It might take 1 to 3 weeks to heal.  Follow-up care is a key part of your treatment and safety. Be sure to make and go to all appointments, and call your doctor if you are having problems. It's also a good idea to know your test results and keep a list of the medicines you take. How can you care for yourself at home? · If you have acid reflux, your doctor may recommend that you:  ?  Eat several small meals instead of two or three large meals. After you eat, wait 2 to 3 hours before you lie down. ? Avoid chocolate, mint, alcohol, and spicy foods. ? Don't smoke or use smokeless tobacco. Smoking can make this condition worse. If you need help quitting, talk to your doctor about stop-smoking programs and medicines. These can increase your chances of quitting for good. ? Raise the head of your bed 6 to 8 inches if you have symptoms at night. ? Lose weight if you are overweight. ? Take an over-the-counter antacid, such as Maalox, Mylanta, or Tums. Be careful when you take over-the-counter antacid medicines. Many of these medicines have aspirin in them. Read the label to make sure that you are not taking more than the recommended dose. Too much aspirin can be harmful. ? Take stronger acid reducers. Examples are famotidine (such as Pepcid) and omeprazole (such as Prilosec). · If your condition is caused by infection, allergy, or other problems, use the medicine or treatments that your doctor recommends. · Be safe with medicines. Take your medicines exactly as prescribed. Call your doctor if you think you are having a problem with your medicine. When should you call for help? Call your doctor now or seek immediate medical care if:    · You have new or worse belly pain.     · You are vomiting. Watch closely for changes in your health, and be sure to contact your doctor if:    · You have new or worse symptoms of reflux.     · You have trouble or pain swallowing.     · You are losing weight.     · You do not get better as expected. Where can you learn more? Go to http://www.gray.com/  Enter N977 in the search box to learn more about \"Esophagitis: Care Instructions. \"  Current as of: February 10, 2021               Content Version: 13.0  © 4858-0050 Healthwise, Incorporated.    Care instructions adapted under license by [employer] (which disclaims liability or warranty for this information). If you have questions about a medical condition or this instruction, always ask your healthcare professional. [place] [address] any warranty or liability for your use of this information. Colonoscopy: What to Expect at [address] [place] [address]  After you have a colonoscopy, you will stay at the clinic for 1 to 2 hours until the medicines wear off. Then you can go home. But you will need to arrange for a ride. Your doctor will tell you when you can eat and do your other usual activities. Your doctor will talk to you about when you will need your next colonoscopy. Your doctor can help you decide how often you need to be checked. This will depend on the results of your test and your risk for colorectal cancer. After the test, you may be bloated or have gas pains. You may need to pass gas. If a biopsy was done or a polyp was removed, you may have streaks of blood in your stool (feces) for a few days. This care sheet gives you a general idea about how long it will take for you to recover. But each person recovers at a different pace. Follow the steps below to get better as quickly as possible. How can you care for yourself at home? Activity  · Rest when you feel tired. · You can do your normal activities when it feels okay to do so. Diet  · Follow your doctor's directions for eating. · Unless your doctor has told you not to, drink plenty of fluids. This helps to replace the fluids that were lost during the colon prep. · Do not drink alcohol. Medicines  · If polyps were removed or a biopsy was done during the test, your doctor may tell you not to take aspirin or other anti-inflammatory medicines for a few days. These include ibuprofen (Advil, Motrin) and naproxen (Aleve). Other instructions  · For your safety, do not drive or operate machinery until the medicine wears off and you can think clearly.  Your doctor may tell you not to drive or operate machinery until the day after your test.  · Do not sign legal documents or make major decisions until the medicine wears off and you can think clearly. The anesthesia can make it hard for you to fully understand what you are agreeing to. Follow-up care is a key part of your treatment and safety. Be sure to make and go to all appointments, and call your doctor if you are having problems. It's also a good idea to know your test results and keep a list of the medicines you take. When should you call for help? Call 911 anytime you think you may need emergency care. For example, call if:  · You passed out (lost consciousness). · You pass maroon or bloody stools. · You have severe belly pain. Call your doctor now or seek immediate medical care if:  · Your stools are black and tarlike. · Your stools have streaks of blood, but you did not have a biopsy or any polyps removed. · You have belly pain, or your belly is swollen and firm. · You vomit. · You have a fever. · You are very dizzy. Watch closely for changes in your health, and be sure to contact your doctor if you have any problems. Where can you learn more? Go to Tello.be  Enter E264 in the search box to learn more about \"Colonoscopy: What to Expect at Home. \"   © 7826-1584 Healthwise, Incorporated. Care instructions adapted under license by [employer] (which disclaims liability or warranty for this information). This care instruction is for use with your licensed healthcare professional. If you have questions about a medical condition or this instruction, always ask your healthcare professional. [place] [address] any warranty or liability for your use of this information. Content Version: 96.0.204959; Current as of: November 14, 2014      DISCHARGE SUMMARY from Nurse     POST-PROCEDURE INSTRUCTIONS:    Call your Physician if you:  ? Observe any excess bleeding. ? Develop a temperature over 100.5o F.  ?  Experience abdominal, shoulder or chest pain. ? Notice any signs of decreased circulation or nerve impairment to an extremity such as a change in color, persistent numbness, tingling, coldness or increase in pain. ? Vomit blood or you have nausea and vomiting lasting longer than 4 hours. ? Are unable to take medications. ? Are unable to urinate within 8 hours after discharge following general anesthesia or intravenous sedation. For the next 24 hours after receiving general anesthesia or intravenous sedation, or while taking prescription Narcotics, limit your activities:  ? Do NOT drive a motor vehicle, operate hazard machinery or power tools, or perform tasks that require coordination. The medication you received during your procedure may have some effect on your mental awareness. ? Do NOT make important personal or business decisions. The medication you received during your procedure may have some effect on your mental awareness. ? Do NOT drink alcoholic beverages. These drinks do not mix well with the medications that have been given to you. ? Upon discharge from the hospital, you must be accompanied by a responsible adult. ? Resume your diet as directed by your physician. ? Resume medications as your physician has prescribed. ? Please give a list of your current medications to your Primary Care Provider. ? Please update this list whenever your medications are discontinued, doses are changed, or new medications (including over-the-counter products) are added. ? Please carry medication information at all times in case of emergency situations. These are general instructions for a healthy lifestyle:    No smoking/ No tobacco products/ Avoid exposure to second hand smoke.  Surgeon General's Warning:  Quitting smoking now greatly reduces serious risk to your health. Obesity, smoking, and a sedentary lifestyle greatly increase your risk for illness.    A healthy diet, regular physical exercise & weight monitoring are important for maintaining a healthy lifestyle   You may be retaining fluid if you have a history of heart failure or if you experience any of the following symptoms:  Weight gain of 3 pounds or more overnight or 5 pounds in a week, increased swelling in our hands or feet or shortness of breath while lying flat in bed. Please call your doctor as soon as you notice any of these symptoms; do not wait until your next office visit. Colorectal Screening   Colorectal cancer almost always develops from precancerous polyps (abnormal growths) in the colon or rectum. Screening tests can find precancerous polyps, so that they can be removed before they turn into cancer. Screening tests can also find colorectal cancer early, when treatment works best.  [employer] Speak with your physician about when you should begin screening and how often you should be tested. Additional Information    Educational references and/or instructions provided during this visit included:    See attached. APPOINTMENTS:    Per MD Instruction. Discharge information has been reviewed with the patient. The patient verbalized understanding.

## 2021-11-01 RX ORDER — CARVEDILOL 6.25 MG/1
TABLET ORAL
Qty: 180 TABLET | Refills: 3 | Status: SHIPPED | OUTPATIENT
Start: 2021-11-01 | End: 2022-05-02 | Stop reason: SDUPTHER

## 2021-11-01 NOTE — PROGRESS NOTES
Progress Note    Patient: Shanique Lino MRN: [id number]  SSN: [id number]    YOB: 1950  Age: 77 y.o. Sex: female      Admit Date: 4/20/2017    LOS: 25 days     Subjective:     RECENT EVENTS REVIEWED. .. SPIKED HI FEVER AND HYPOTENSION. Yonatan Brown   ADMITTED TO ICU  GROWING  GPC IN BLOOD  NOT ON PRESSORS NOW  MNIMAL  OUTPUT FROM  DRAIN    Objective:     Vitals:    05/15/17 0300 05/15/17 0400 05/15/17 0700 05/15/17 0730   BP: 109/51 139/78 95/41 104/41   Pulse: 90 (!) 105 85 85   Resp: 23 15 13 13   Temp:  (!) 100.7 °F (38.2 °C)     TempSrc:       SpO2: 100% 100% 99% 98%   Weight:       Height:            Intake and Output:  Current Shift:    Last three shifts: 05/13 1901 - 05/15 0700  In: 3823.8 [P.O.:360; I.V.:3433.8]  Out: 1560 [Urine:1550; Drains:10]    Current Facility-Administered Medications   Medication Dose Route Frequency    magnesium sulfate 1 g/100 ml IVPB (premix or compounded)  1 g IntraVENous ONCE    acetaminophen (TYLENOL) suppository 650 mg  650 mg Rectal Q6H PRN    acetaminophen (TYLENOL) tablet 500 mg  500 mg Oral Q6H PRN    linezolid (ZYVOX) IVPB premix in D5W 600 mg  600 mg IntraVENous Q12H    gabapentin (NEURONTIN) capsule 200 mg  200 mg Oral DAILY    diclofenac (VOLTAREN) 1 % topical gel 2 g  2 g Topical Q8H PRN    allopurinol (ZYLOPRIM) tablet 50 mg  50 mg Oral DAILY    aluminum-magnesium hydroxide (MAALOX) oral suspension 15 mL  15 mL Oral QID PRN    albuterol-ipratropium (DUO-NEB) 2.5 MG-0.5 MG/3 ML  3 mL Nebulization Q4HWA RT    mirtazapine (REMERON SOL-TAB) disintegrating tablet 15 mg  15 mg Oral QHS    Lactobacillus Acidoph & Bulgar (FLORANEX) tablet 1 Tab  1 Tab Oral BID    sodium chloride (NS) flush 5-10 mL  5-10 mL IntraVENous Q8H    naloxone (NARCAN) injection 0.1 mg  0.1 mg IntraVENous Multiple    acetaminophen (TYLENOL) tablet 650 mg  650 mg Oral Q4H PRN    0.9% sodium chloride infusion  100 mL/hr IntraVENous CONTINUOUS    famotidine (PEPCID) tablet 20 mg 20 mg Oral DAILY    insulin lispro (HUMALOG) injection   SubCUTAneous AC&HS    0.9% sodium chloride infusion 250 mL  250 mL IntraVENous PRN    cefTRIAXone (ROCEPHIN) 2 g in 0.9% sodium chloride (MBP/ADV) 50 mL MBP  2 g IntraVENous Q24H    insulin glargine (LANTUS) injection 20 Units  20 Units SubCUTAneous DAILY    diphenhydrAMINE (BENADRYL) capsule 50 mg  50 mg Oral Q4H PRN    polyethylene glycol (MIRALAX) packet 17 g  17 g Oral TID    cholecalciferol (VITAMIN D3) tablet 2,000 Units  2,000 Units Oral DAILY    docusate sodium (COLACE) capsule 100 mg  100 mg Oral DAILY AFTER BREAKFAST    oxyCODONE-acetaminophen (PERCOCET 7.5) 7.5-325 mg per tablet 1 Tab  1 Tab Oral Q4H PRN    pravastatin (PRAVACHOL) tablet 40 mg  40 mg Oral QHS    senna-docusate (PERICOLACE) 8.6-50 mg per tablet 2 Tab  2 Tab Oral PCD    folic acid (FOLVITE) tablet 1 mg  1 mg Oral DAILY    ondansetron (ZOFRAN) injection 4 mg  4 mg IntraVENous Q8H PRN    glucose chewable tablet 16 g  16 g Oral PRN    glucagon (GLUCAGEN) injection 1 mg  1 mg IntraMUSCular PRN    dextrose (D50W) injection syrg 12.5-25 g  25-50 mL IntraVENous PRN         Physical Exam:   GENERAL: alert, cooperative, no distress, appears stated age  ABDOMEN: soft, non-tender.  Bowel sounds normal. No masses,  no organomegaly  FLANK: NON  TENDER      Lab/Data Review:  BMP:   Lab Results   Component Value Date/Time     05/15/2017 06:04 AM    K 4.0 05/15/2017 06:04 AM     05/15/2017 06:04 AM    CO2 25 05/15/2017 06:04 AM    AGAP 9 05/15/2017 06:04 AM     (H) 05/15/2017 06:04 AM    BUN 30 (H) 05/15/2017 06:04 AM    CREA 2.98 (H) 05/15/2017 06:04 AM    GFRAA 19 (L) 05/15/2017 06:04 AM    GFRNA 16 (L) 05/15/2017 06:04 AM     CMP:   Lab Results   Component Value Date/Time     05/15/2017 06:04 AM    K 4.0 05/15/2017 06:04 AM     05/15/2017 06:04 AM    CO2 25 05/15/2017 06:04 AM    AGAP 9 05/15/2017 06:04 AM     (H) 05/15/2017 06:04 AM    BUN 30 (H) 05/15/2017 06:04 AM    CREA 2.98 (H) 05/15/2017 06:04 AM    GFRAA 19 (L) 05/15/2017 06:04 AM    GFRNA 16 (L) 05/15/2017 06:04 AM    CA 8.1 (L) 05/15/2017 06:04 AM    MG 1.8 05/15/2017 06:04 AM    PHOS 4.0 05/15/2017 06:04 AM    ALB 1.3 (L) 05/14/2017 04:30 PM    TP 5.9 (L) 05/14/2017 04:30 PM    GLOB 4.6 (H) 05/14/2017 04:30 PM    AGRAT 0.3 (L) 05/14/2017 04:30 PM    SGOT 83 (H) 05/14/2017 04:30 PM    ALT 69 (H) 05/14/2017 04:30 PM     CBC:   Lab Results   Component Value Date/Time    WBC 10.6 05/15/2017 06:04 AM    HGB 7.1 (L) 05/15/2017 06:04 AM    HCT 21.5 (L) 05/15/2017 06:04 AM     05/15/2017 06:04 AM      CT DRAIN  FROM  5/12    IMPRESSION  IMPRESSION:        1. Successful, uncomplicated new CT-guided right psoas muscle fluid collection  drainage and drainage catheter placement. 2. Successful uncomplicated removal of the old right psoas muscle drainage  catheter.     PLAN: Drainage catheter should be attached to bulb suction. Outputs should be  monitored Q shift. Catheter should be flushed in the antegrade fashion with 10  cc of sterile normal saline 3 times a day.       CT Results:    Results from Hospital Encounter encounter on 04/20/17   CT DRAIN ABS W CATH PERC   Narrative PREOPERATIVE DIAGNOSIS: Right psoas muscle abscess. POSTOPERATIVE DIAGNOSIS: Same    INDICATION: Residual right psoas muscle fluid collection. ATTENDING: JOANN Medina Sickle: None. PROCEDURES:  1. New CT-guided drainage of a small right psoas muscle residual fluid  collection and drainage catheter placement. 2. Old right psoas muscle drainage catheter removal.    ANESTHESIA: Local 1% lidocaine as well as moderate intravenous sedation with  Versed and fentanyl given and monitored per independently trained interventional  radiology nurse under my direct supervision for 30 minutes. Please see detailed  nursing records for medication dosing. CONTRAST: None.     COMPLICATIONS: None    DRAIN: No    CATHETER: None. EBL: None. SPECIMEN: None. TECHNIQUE:     All CT scans at this facility are performed using dose optimization technique as  appropriate to a performed exam, to include automated exposure control,  adjustment of the mA and/or kV according to patient size (including appropriate  matching for site-specific examinations), or use of iterative reconstruction  technique. The risks, benefits, and alternatives were discussed. Written and verbal consent  obtained. Patient was placed supine on CT table and the right lower quadrant  region was prepped and draped in usual sterile fashion. Maximum sterile barrier  technique was used. Timeout was performed. 1% lidocaine was then used. Preliminary CT imaging of the lower thoracic and lumbar spine was obtained. Under direct CT fluoroscopy guidance using 18-gauge trocar needle was advanced  down into collection. J-wire was advanced through the needle. Serial dilatations  were performed. 8.5 [employer] all-purpose drainage catheter was placed into  collection under direct CT fluoroscopy. Distal coil was formed. Catheter was  flushed with normal saline and attached to bulb suction. External tubing was  affixed to the skin with 0 proline. Sterile dressing was applied. Old right psoas muscle drainage catheter has been removed without difficulty and  discarded. Sterile dressing was applied. Patient tolerated procedure fairly well. There were no immediate complications. Patient was transferred to recovery in stable condition. Dr. Jerson Tafoya was  present and performed the procedure. FINDINGS: Initial imaging demonstrated very minimal residual right psoas muscle  fluid collection extending inferiorly. Significant reduction of the size of the  right psoas muscle is demonstrated. Previously placed drainage catheter has been  minimally pulled back. Therefore old drainage catheter was removed given its small position.     New drainage catheter was placed as described above. .    CT fluoroscopic guidance demonstrated good position of the access needle as well  as new drainage catheter. Impression IMPRESSION:      1. Successful, uncomplicated new CT-guided right psoas muscle fluid collection  drainage and drainage catheter placement. 2. Successful uncomplicated removal of the old right psoas muscle drainage  catheter. PLAN: Drainage catheter should be attached to bulb suction. Outputs should be  monitored Q shift. Catheter should be flushed in the antegrade fashion with 10  cc of sterile normal saline 3 times a day. US Results:    Results from [place] encounter on 04/20/17   US ABD LTD   Narrative ULTRASOUND ABDOMEN-LIMITED    INDICATION: Evaluation for fluid collection/abscess at the site of pacemaker  pocket    COMPARISON: Correlation with CT abdomen/pelvis 4/19/2017    TECHNIQUE: Ultrasound evaluation was performed of the left upper quadrant  abdomen in the area of pacemaker. Selected static images are submitted for  review. FINDINGS:  In the area of concern, there is no evidence for focal fluid collection/abscess. Impression IMPRESSION:  As above.               Assessment:     Principal Problem:    Acute paraplegia ([address] (4/20/2017)    Active Problems:    Type 2 diabetes mellitus with diabetic neuropathy ([address] (6/28/2011)      AICD generator infection ([address] (8/20/2012)      Overview: S/p explant 4 leads 8/20/12      Benign hypertensive heart disease with systolic CHF, NYHA class 2 ([address] (9/5/2012)      Decreased calculated glomerular filtration rate (GFR) (3/30/2017)      Overview: Calculated GFR equivalent to that of CKD stage 3 = 30-59 ml/min      Iliopsoas muscle hematoma (3/30/2017)      Psoas hematoma, right, secondary to anticoagulant therapy (3/30/2017)      Impaired mobility and ADLs (3/30/2017)      History of Coumadin therapy ()      Overview: Anticoagulation for chronic atrial fibrillation; Discontinued on 3/30/2017      Sepsis ([facility] (4/20/2017)      Psoas abscess, right ([facility] (4/20/2017)      Krueger catheter in place on admission (4/20/2017)      Urinary tract infection due to Enterococcus (4/20/2017)      Group B streptococcal infection (4/20/2017)        Plan:     SHOULD CONSIDER REPEAT  CT TO SEE IF ANY NEW ABSCESS. ...   AFTER THIS MANY ATTEMPTS AT  [facility] DRAINAGE,  IF STILL NOT CONTROLLING INFECTION,   AND REPEAT ABSCESS FOUND,   MAY NEED TO CONSIDER  OPEN  EXPLORATION  AND DRAINAGE  WILL D/W  PCP AND GEN SURG    Signed By: Ortiz Burns MD , FACS    May 15, 2017      PAGER:  [phone]  CELL:  [facility]  OFFICE:  [address] [place], [place] [address] [phone] no

## 2022-01-04 RX ORDER — SACUBITRIL AND VALSARTAN 97; 103 MG/1; MG/1
TABLET, FILM COATED ORAL
Qty: 180 TABLET | Refills: 3 | Status: SHIPPED | OUTPATIENT
Start: 2022-01-04 | End: 2022-05-02 | Stop reason: SDUPTHER

## 2022-02-03 RX ORDER — METOLAZONE 2.5 MG/1
TABLET ORAL
Qty: 8 TABLET | Refills: 3 | Status: SHIPPED | OUTPATIENT
Start: 2022-02-03

## 2022-02-15 ENCOUNTER — APPOINTMENT (OUTPATIENT)
Dept: PHYSICAL THERAPY | Age: 72
End: 2022-02-15
Payer: MEDICARE

## 2022-02-16 ENCOUNTER — HOSPITAL ENCOUNTER (OUTPATIENT)
Dept: PHYSICAL THERAPY | Age: 72
Discharge: HOME | End: 2022-02-16
Payer: MEDICARE

## 2022-02-16 PROCEDURE — 97161 PT EVAL LOW COMPLEX 20 MIN: CPT

## 2022-02-16 PROCEDURE — 97530 THERAPEUTIC ACTIVITIES: CPT

## 2022-02-16 NOTE — PROGRESS NOTES
PT DAILY TREATMENT NOTE/LUMBAR EVAL     Patient Name: Navarro Ocampo  Date:2022  : 1950  [x]  Patient  Verified  Payor: Anabel Mary / [facility] / Product Type: Managed Care Medicare /    In time: 9:45   Out time: 10:33  Total Treatment Time (min): 48  Visit #: 1 of     Medicare/BCBS Only   Total Timed Codes (min):  23 1:1 Treatment Time:  48     Treatment Area: Other low back pain [M54.59]  SUBJECTIVE  Pain Level (0-10 scale): \"10/10\"  []constant []intermittent []improving []worsening []no change since onset    Any medication changes, allergies to medications, adverse drug reactions, diagnosis change, or new procedure performed?: [x] No    [] Yes (see summary sheet for update)  Subjective functional status/changes:     PLOF: using WC since 2018  Limitations to PLOF:   Mechanism of Injury:   Current symptoms/Complaints: Ms. Ralph Gonzalez is a 70 y/o, F pt with CC of LBP/Right SI joint region. She denies numbness/tingling but reports a lot of cramping with B hands/UEs. Pt has been WC bound since 2018 after a fall. Previous Treatment/Compliance:   PMHx/Surgical Hx:   Work Hx:   Living Situation:   Pt Goals: to walk with or without assistance, pain mangement  Barriers: []pain []financial []time []transportation []other  Motivation:   Substance use: []Alcohol []Tobacco []other:   FABQ Score: []low []elevate  Cognition: A & O x     Other:    OBJECTIVE/EXAMINATION  Domestic Life:   Activity/Recreational Limitations:   Mobility:   Self Care:          Modality rationale: decrease pain and increase tissue extensibility to improve the patients ability to tolerate ADLs   Min Type Additional Details    [] Estim:  []Unatt       []IFC  []Premod                        []Other:  []w/ice   []w/heat  Position:  Location:    [] Estim: []Att    []TENS instruct  []NMES                    []Other:  []w/US   []w/ice   []w/heat  Position:  Location:    []  Traction: [] Cervical []Lumbar                       [] Prone          []Supine                       []Intermittent   []Continuous Lbs:  [] before manual  [] after manual    []  Ultrasound: []Continuous   [] Pulsed                           []1MHz   []3MHz Location:  W/cm2:    []  Iontophoresis with dexamethasone         Location: [] Take home patch   [] In clinic   10 (5 min during TA) []  Ice     [x]  heat  []  Ice massage  []  Laser   []  Anodyne Position: sitting  Location:back    []  Laser with stim  []  Other: Position:  Location:    []  Vasopneumatic Device Pressure:       [] lo [] med [] hi   Temperature: [] lo [] med [] hi   [x] Skin assessment post-treatment:  [x]intact []redness- no adverse reaction    []redness  adverse reaction:     20 min []Eval                  []Re-Eval       23 min Therapeutic Activity:  []  See flow sheet :Pt edu within scope of practice on prognosis, POC, modalities use, positioning, AD. Rationale: increase ROM, increase strength, improve coordination, improve balance and increase proprioception  to improve the patients ability to perform ADLs with more ease             With   [] TE   [] TA   [] neuro   [] other: Patient Education: [x] Review HEP    [] Progressed/Changed HEP based on:   [] positioning   [] body mechanics   [] transfers   [] heat/ice application    [] other:      Other Objective/Functional Measures:     Physical Therapy Evaluation - Lumbar Spine ([facility])    SUBJECTIVE  Chief Complaint:    Mechanism of injury:    Symptoms:  Aggravated by:   [] Bending [] Sitting [] Standing [] Walking   [] Moving [] Cough [] Sneeze [] Valsalva   [] AM  [] PM  Lying:  [] sup   [] pro   [] sidelying   [] Other:     Eased by:    [] Bending [] Sitting [] Standing [] Walking   [] Moving [] AM  [] PM  Lying: [] sup  [] pro  [] sidelying   [] Other:     General Health:  Red Flags Indicated? [] Yes    [] No  [] Yes [] No Recent weight change (If yes, due to dieting?  [] Yes  [] No)   [] Yes [] No Weakness in legs during walking  [] Yes [] No Unremitting pain at night  [] Yes [] No Abdominal pain or problems  [] Yes [] No Rectal bleeding  [] Yes [] No Feet more cold or painful in cold weather  [] Yes [] No Menstrual irregularities  [] Yes [] No Blood or pain with urination  [x] Yes [] No Dysfunction of bowel or bladder  [] Yes [] No Recent illness within past 3 weeks (i.e, cold, flu)  [] Yes [x] No Numbness/tingling in buttock/genitalia region    Past History/Treatments:     Diagnostic Tests: [] Lab work [] X-rays    [] CT [] MRI     [] Other:  Results:    Functional Status  Prior level of function:  Present functional limitations:  What position do you sleep in?:    OBJECTIVE  Posture:  Lateral Shift: [] R    [] L     [] +  [] -  Kyphosis: [] Increased [] Decreased   []  WNL  Lordosis:  [] Increased [] Decreased   [] WNL  Pelvic symmetry: [] WNL    [] Other:    Gait:  [] Normal     [] Abnormal:    Active Movements: [] N/A   [] Too acute   [] Other:  ROM % AROM % PROM Comments:pain, area   Forward flexion 40-60 WFL     Extension 20-30 25%     SB right 20-30 50%     SB left 20-30 50%     Rotation right 5-10 WFL     Rotation left 5-10 WFL       Neuro Screen [] WNL WNL with light touch except B feet  Myotome/Dermatome/Reflexes:  Comments:    Dural Mobility:  SLR Sitting: [] R    [] L    [] +    [] -  @ (degrees):           Supine: [] R    [] L    [] +    [] -  @ (degrees):   Slump Test: [] R    [] L    [] +    [] -  @ (degrees):   Prone Knee Bend: [] R    [] L    [] +    [] -     Palpation  [] Min  [] Mod  [x] Severe    Location: L3-5 & sacrum  [] Min  [] Mod  [] Severe    Location:  [] Min  [] Mod  [] Severe    Location:    Strength   L(0-5) R (0-5) N/T   Hip Flexion (L1,2) 3- 3- []   Knee Extension (L3,4) 4+ 4+ []   Ankle Dorsiflexion (L4) 1 1 []   Great Toe Extension (L5) 0 0 []   Ankle Plantarflexion (S1) ~1+ ~1- []   Knee Flexion (S1,2) 1- 1- []   Upper Abdominals   []   Lower Abdominals   [] Paraspinals   []   Back Rotators   []   Gluteus Jaime ~2- ~2- []   Other   []     Special Tests  Lumbar:  Lumb. Compression: [] Pos  [] Neg               Lumbar Distraction:   [] Pos  [] Neg    Quadrant:  [] Pos  [] Neg   [] Flex  [] Ext    Sacroilliac:  Gaenslen's: [] R    [] L    [] +    [] -     Compression: [] +    [] -     Gapping:  [] +    [] -     Thigh Thrust: [] R    [] L    [] +    [] -     Leg Length: [] +    [] -   Position:    Crests:    ASIS:    PSIS:    Sacral Sulcus:    Mobility: Standing flex:     Sitting flex:     Supine to sit:     Prone knee bend:         Hip: Allie Babinski:  [] R    [] L    [] +    [] -     Scour:  [] R    [] L    [] +    [] -     Piriformis: [x] R    [x] L    [x] +    [] -          Deficits: Ammy's: [] R    [] L    [] +    [] -     Darwin: [] R    [] L    [] +    [] -     Hamstrings 90/90: max tightness with Right, mod with Left    Gastrocsoleus (to neutral): max tightness B Right: Left:       Global Muscular Weakness:  Abdominals:  Quadratus Lumborum:  Paraspinals: Other:    Other tests/comments:   Good sitting balance       Pain Level (0-10 scale) post treatment: 9/10    ASSESSMENT/Changes in Function: see POC    Patient will continue to benefit from skilled PT services to modify and progress therapeutic interventions, address functional mobility deficits, address ROM deficits, address strength deficits, analyze and address soft tissue restrictions, analyze and cue movement patterns, analyze and modify body mechanics/ergonomics, assess and modify postural abnormalities, address imbalance/dizziness and instruct in home and community integration to attain remaining goals.      [x]  See Plan of Care  []  See progress note/recertification  []  See Discharge Summary         Progress towards goals / Updated goals:  See POC    PLAN  [x]  Upgrade activities as tolerated     [x]  Continue plan of care  []  Update interventions per flow sheet       []  Discharge due to:_  [] Other:_      Ulysses Manchester 2/16/2022  9:12 AM

## 2022-02-21 ENCOUNTER — HOSPITAL ENCOUNTER (OUTPATIENT)
Dept: PHYSICAL THERAPY | Age: 72
Discharge: HOME | End: 2022-02-21
Payer: MEDICARE

## 2022-02-21 PROCEDURE — 97110 THERAPEUTIC EXERCISES: CPT

## 2022-02-21 PROCEDURE — 97140 MANUAL THERAPY 1/> REGIONS: CPT

## 2022-02-21 NOTE — PROGRESS NOTES
PT DAILY TREATMENT NOTE     Patient Name: Navarro Ocampo  Date:2022  : 1950  [x]  Patient  Verified  Payor: Anabel Mary / Plan: [facility] MEDIC[facility] COMPLETE / Product Type: Managed Care Medicare /    In NVBB:[id number]  Out time:1200  Total Treatment Time (min): 42  Visit #: 2 of     Medicare/BCBS Only   Total Timed Codes (min):  42 1:1 Treatment Time:  42       Treatment Area: Other low back pain [M54.59]    SUBJECTIVE  Pain Level (0-10 scale): 610  Any medication changes, allergies to medications, adverse drug reactions, diagnosis change, or new procedure performed?: [x] No    [] Yes (see summary sheet for update)  Subjective functional status/changes:   [] No changes reported  Pt reports she has been in a WC since 2018 and has not walked nor is unable to stand up due to weakness/Neurpathy. PMHX includes Paraplegia (in [facility] care)  He  and daughter helps take care of her. She has a hospital bed, Trapeze and sliding board. She also has an Aide. Pt reports independence for self care activities and dressing. UE ROM is WNL. OBJECTIVE       34 min Therapeutic Exercise:  [] See flow sheet :   Rationale: increase ROM, increase strength and improve coordination to improve the patients ability to ease with adl's    8 min Manual Therapy:  Ball massage to hip/glute and itband   The manual therapy interventions were performed at a separate and distinct time from the therapeutic activities interventions. Rationale: increase ROM, increase tissue extensibility and increase postural awareness to ease with adl's          With   [] TE   [] TA   [] neuro   [] other: Patient Education: [x] Review HEP    [] Progressed/Changed HEP based on:   [] positioning   [] body mechanics   [] transfers   [] heat/ice application    [] other:      Other Objective/Functional Measures:  All ex's performed seated.    Strength    L(0-5) R (0-5) N/T   Hip Flexion (L1,2) 3- 3- []?    Knee Extension (L3,4) 4+ 4+ []?    Ankle Dorsiflexion (L4) 1 1 []?    Great Toe Extension (L5) 0 0 []?    Ankle Plantarflexion (S1) ~1+ ~1- []?    Knee Flexion (S1,2) 1- 1- []?    Upper Abdominals     []?    Lower Abdominals     []?    Paraspinals     []?    Back Rotators     []?    Gluteus Jaime ~2- ~2- []?    Other     []?      Pt understood that ambulation will be difficult/limited due to decreased LE strength/weakness. Pain Level (0-10 scale) post treatment: 6/10    ASSESSMENT/Changes in Function: Pt reports she had a Hematoma  in 2018 that became septic and was hospitalized (ICU) for a while. She currently continues to have Pain to the same area of the right glute/hip. Today She was introduced to ball massage to improve circulation and blood flow to area and to decrease leg tightness. Pt understands she will initially work on seated ex's / sliding transfers, bed mobility/ core strength, trunk stability. She has a complex history. Patient will continue to benefit from skilled PT services to modify and progress therapeutic interventions, address functional mobility deficits, address ROM deficits, address strength deficits, analyze and address soft tissue restrictions, analyze and cue movement patterns, analyze and modify body mechanics/ergonomics and assess and modify postural abnormalities to attain remaining goals. [x]  See Plan of Care  []  See progress note/recertification  []  See Discharge Summary         Progress towards goals / Updated goals:  1. Pt will be independent with HEP to maintain progression. Eval status: given and reviewed HEP     Long term goals: To be accomplished within 4 weeks  1. Pt will improve FOTO score by 21 points to 37/100 to show improvement with functional mobility performance. Eval status: 16                2. Pt will report at least 50% improvement with back pain to improve her QOL.   Eval status: \"10/10\"                3. Pt will be able to perform sit to stand mod Ind to improve ease with ADLs/transfer. Eval status: unable to perform due to weakness and fear of falling                4. Pt will be able to amb 50ft at least with RW, AFO and min A to improve ease with ADLs/transfer.   Eval status: WC bound since 2018    PLAN  [x]  Upgrade activities as tolerated     [x]  Continue plan of care  []  Update interventions per flow sheet       []  Discharge due to:_  []  Other:_      Jann Taylor PT 2/21/2022  11:27 AM    Future Appointments   Date Time Provider Stanley Mayer   2/25/2022  9:45 AM Ellis Will PT [id number]   3/10/2022  3:20 PM [facility] NURSE [facility] JAYMIE SCHED   3/18/2022  8:20 AM [facility] [place] [facility] AMB   3/18/2022  8:45 AM [facility] BS AMB   3/31/2022 10:20 AM [facility] NURSE [facility] JAYMIE SCHED   4/22/2022 11:20 AM [facility] NURSE [facility] SCHED   4/29/2022 11:00 AM LUDMILA Saleh

## 2022-02-25 ENCOUNTER — APPOINTMENT (OUTPATIENT)
Dept: PHYSICAL THERAPY | Age: 72
End: 2022-02-25
Payer: MEDICARE

## 2022-03-01 ENCOUNTER — HOSPITAL ENCOUNTER (OUTPATIENT)
Dept: PHYSICAL THERAPY | Age: 72
Discharge: HOME | End: 2022-03-01
Payer: MEDICARE

## 2022-03-01 PROCEDURE — 97140 MANUAL THERAPY 1/> REGIONS: CPT

## 2022-03-01 PROCEDURE — 97110 THERAPEUTIC EXERCISES: CPT

## 2022-03-01 NOTE — PROGRESS NOTES
PT DAILY TREATMENT NOTE     Patient Name: Jerod Chew  WZTY:[id number]  : 1950  [x]  Patient  Verified  Payor: Mihir Plan / Plan: [facility] MEDICARE COMPLETE / Product Type: Managed Care Medicare /    In time: 11:18  Out time: 11:58  Total Treatment Time (min): 40  Visit #: 3 of     Medicare/BCBS Only   Total Timed Codes (min):  40 1:1 Treatment Time:  40       Treatment Area: Other low back pain [M54.59]    SUBJECTIVE  Pain Level (0-10 scale): 7/10 L/S and right SIJ  Any medication changes, allergies to medications, adverse drug reactions, diagnosis change, or new procedure performed?: [x] No    [] Yes (see summary sheet for update)  Subjective functional status/changes:   [] No changes reported  Pt reports compliance with her HEP. OBJECTIVE     28 min Therapeutic Exercise:  [] See flow sheet :   Rationale: increase ROM, increase strength and improve coordination to improve the patients ability to ease with adl's    12 min Manual Therapy: manual HS/calf/piriformisstretches bilaterally; manual assistance with LTRs   The manual therapy interventions were performed at a separate and distinct time from the therapeutic activities interventions. Rationale: increase ROM, increase tissue extensibility and increase postural awareness to ease with adl's          With   [] TE   [] TA   [] neuro   [] other: Patient Education: [x] Review HEP    [] Progressed/Changed HEP based on:   [] positioning   [] body mechanics   [] transfers   [] heat/ice application    [] other:      Other Objective/Functional Measures:   W/C <> plinth transfer with sliding board from LEFT side with setup assist  HOB elevated to 45 deg for supine therex and manual   Assistance with bathroom door for toileting at end of session    Pain Level (0-10 scale) post treatment: 7/10    ASSESSMENT/Changes in Function:   Patient with fair tolerance to session with increased pain during stretches that subsided when ceased.  No change in pain level or sx after session. Additional time required for setup for transfers. Patient will continue to benefit from skilled PT services to modify and progress therapeutic interventions, address functional mobility deficits, address ROM deficits, address strength deficits, analyze and address soft tissue restrictions, analyze and cue movement patterns, analyze and modify body mechanics/ergonomics and assess and modify postural abnormalities to attain remaining goals. [x]  See Plan of Care  []  See progress note/recertification  []  See Discharge Summary         Progress towards goals / Updated goals:  1. Pt will be independent with HEP to maintain progression. Eval status: given and reviewed HEP  Current: MET (3/1/22)     Long term goals: To be accomplished within 4 weeks  1. Pt will improve FOTO score by 21 points to 37/100 to show improvement with functional mobility performance. Eval status: 16                2. Pt will report at least 50% improvement with back pain to improve her QOL. Eval status: \"10/10\"                3. Pt will be able to perform sit to stand mod Ind to improve ease with ADLs/transfer. Eval status: unable to perform due to weakness and fear of falling                4. Pt will be able to amb 50ft at least with RW, AFO and min A to improve ease with ADLs/transfer.   Eval status: WC bound since 2018    PLAN  [x]  Upgrade activities as tolerated     [x]  Continue plan of care  []  Update interventions per flow sheet       []  Discharge due to:_  []  Other:_      SERG Ruelas 3/1/2022  11:58 AM    Future Appointments   Date Time Provider Stanley Mayer   3/1/2022 11:15 AM Teresa Elizabeth MMCPTPB [facility]   3/10/2022  3:20 PM [facility]   3/18/2022  8:20 AM Imelda Parnell DO [address] [address] BS AMB   3/18/2022  8:45 AM CSI, PACER [facility] BS AMB   3/30/2022 10:20 AM [facility] NURSE [facility] JAYMIE LEVY   4/22/2022 11:20 AM [facility] NURSE [address] [facility]   4/29/2022 11:00 AM Victor M Garcia, [address]

## 2022-03-04 ENCOUNTER — HOSPITAL ENCOUNTER (OUTPATIENT)
Dept: PHYSICAL THERAPY | Age: 72
Discharge: HOME | End: 2022-03-04
Payer: MEDICARE

## 2022-03-04 PROCEDURE — 97110 THERAPEUTIC EXERCISES: CPT | Performed by: GENERAL ACUTE CARE HOSPITAL

## 2022-03-04 NOTE — PROGRESS NOTES
PT DAILY TREATMENT NOTE     Patient Name: Edith Cooper  Date:3/4/2022  : 1950  [x]  Patient  Verified  Payor: Jagdeep [employer] / Plan: [facility] MEDICARE COMPLETE / Product Type: Managed Care Medicare /    In time: 12:50  Out time: 1:30  Total Treatment Time (min): 40  Visit #: 4 of     Medicare/BCBS Only   Total Timed Codes (min):  40 1:1 Treatment Time:  40       Treatment Area: Other low back pain [M54.59]    SUBJECTIVE  Pain Level (0-10 scale): 6/10 L/S and right SIJ   Any medication changes, allergies to medications, adverse drug reactions, diagnosis change, or new procedure performed?: [x] No    [] Yes (see summary sheet for update)  Subjective functional status/changes:   [] No changes reported  Pt reports feeling a little better today. Notes that she was tired after her last session, but felt okay. OBJECTIVE     40 min Therapeutic Exercise:  [] See flow sheet :   Rationale: increase ROM, increase strength and improve coordination to improve the patients ability to ease with adl's    NI min Manual Therapy: manual HS/calf/piriformisstretches bilaterally; manual assistance with LTRs   The manual therapy interventions were performed at a separate and distinct time from the therapeutic activities interventions. Rationale: increase ROM, increase tissue extensibility and increase postural awareness to ease with adl's          With   [] TE   [] TA   [] neuro   [] other: Patient Education: [x] Review HEP    [] Progressed/Changed HEP based on:   [] positioning   [] body mechanics   [] transfers   [] heat/ice application    [] other:      Other Objective/Functional Measures:    Added LAQ, [address] (no TBand), SB rollout (limited range), seated OH ball raise (red SB), SLR with assistance, partial range bridges (noted over use of B UE to help liftoff)     Pain Level (0-10 scale) post treatment: 7/10     ASSESSMENT/Changes in Function:  Pt is able to complete slide board transfers with set up support only. Pt demonstrates trunk extension during transfers and seated scooting EOB, but VC for attempting trunk flexion in order to unweight hips for ease of transfer. Added SB rollout to encourage trunk flexion and anterior weight shift, but body habitus limiting ability secondary to adipose tissue. Pt very fatigued by current exercises and progressions today. Continue to progress as tolerated. Patient will continue to benefit from skilled PT services to modify and progress therapeutic interventions, address functional mobility deficits, address ROM deficits, address strength deficits, analyze and address soft tissue restrictions, analyze and cue movement patterns, analyze and modify body mechanics/ergonomics and assess and modify postural abnormalities to attain remaining goals. [x]  See Plan of Care  []  See progress note/recertification  []  See Discharge Summary         Progress towards goals / Updated goals:  1. Pt will be independent with HEP to maintain progression. Eval status: given and reviewed HEP  Current: MET (3/1/22)     Long term goals: To be accomplished within 4 weeks  1. Pt will improve FOTO score by 21 points to 37/100 to show improvement with functional mobility performance. Eval status: 16                2. Pt will report at least 50% improvement with back pain to improve her QOL. Eval status: \"10/10\"                3. Pt will be able to perform sit to stand mod Ind to improve ease with ADLs/transfer. Eval status: unable to perform due to weakness and fear of falling                4. Pt will be able to amb 50ft at least with RW, AFO and min A to improve ease with ADLs/transfer.   Eval status: WC bound since 2018    PLAN  [x]  Upgrade activities as tolerated     [x]  Continue plan of care  []  Update interventions per flow sheet       []  Discharge due to:_  []  Other:_      Ginger Mancilla, PT 3/4/2022  11:58 AM    Future Appointments   Date Time Provider Stanley Mayer 3/4/2022 12:45 PM Anastacia Francisco, PT MMCPTPB [facility]   3/9/2022 10:30 AM Eugune Glory MMCPTPB [facility]   3/10/2022  3:20 PM [facility] NURSE [facility] JAYMIE SCHED   3/15/2022 11:15 AM Sly Washington, PT [id number] [facility]   3/17/2022 12:00 PM Eugune Glory MMCPTPB [facility]   3/18/2022  8:20 AM Jay Jay CINTRON DO [facility] BS AMB   3/18/2022  8:45 AM CSI, PACER [facility] BS AMB   3/30/2022 10:20 AM [facility] NURSE [facility] JAYMIE SCHED   4/20/2022  2:00 PM [facility] NURSE [facility] JAYMIE SCHED   5/18/2022  2:45 PM Leopoldo Fletcher, [address]

## 2022-03-08 RX ORDER — FUROSEMIDE 40 MG/1
TABLET ORAL
Qty: 135 TABLET | Refills: 3 | Status: SHIPPED | OUTPATIENT
Start: 2022-03-08 | End: 2022-05-02 | Stop reason: SDUPTHER

## 2022-03-09 ENCOUNTER — HOSPITAL ENCOUNTER (OUTPATIENT)
Dept: PHYSICAL THERAPY | Age: 72
Discharge: HOME | End: 2022-03-09
Payer: MEDICARE

## 2022-03-09 PROCEDURE — 97110 THERAPEUTIC EXERCISES: CPT

## 2022-03-09 NOTE — PROGRESS NOTES
PT DAILY TREATMENT NOTE     Patient Name: Brittanie Deng  Date:3/9/2022  : 1950  [x]  Patient  Verified  Payor: Miladys Shay / Plan: [facility] MEDICARE COMPLETE / Product Type: Managed Care Medicare /    In time: 10:38  Out time: 11:15  Total Treatment Time (min): 37  Visit #: 5 of     Medicare/BCBS Only   Total Timed Codes (min):  37 1:1 Treatment Time:  37       Treatment Area: Other low back pain [M54.59]    SUBJECTIVE  Pain Level (0-10 scale): 8/10 L/S and right SIJ   Any medication changes, allergies to medications, adverse drug reactions, diagnosis change, or new procedure performed?: [x] No    [] Yes (see summary sheet for update)  Subjective functional status/changes:   [] No changes reported  The following day after last session she was in a lot of pain and it lasted about 3 days. She only did her HEP 1x since last session because of her pain. OBJECTIVE     30 min Therapeutic Exercise:  [] See flow sheet :   Rationale: increase ROM, increase strength and improve coordination to improve the patients ability to ease with adl's    7 min Manual Therapy: LAD/leg lengthening for left upslip and MET to correct suspected left PI/right AI rotation - decreased but not fully corrected   The manual therapy interventions were performed at a separate and distinct time from the therapeutic activities interventions.   Rationale: increase ROM, increase tissue extensibility and increase postural awareness to ease with adl's          With   [x] TE   [] TA   [] neuro   [] other: Patient Education: [x] Review HEP    [] Progressed/Changed HEP based on:   [] positioning   [] body mechanics   [] transfers   [] heat/ice application    [x] other: diaphragmatic breathing with attention to ms tension in shoulder and neck     Other Objective/Functional Measures:   - increased BUE use on plinth to maintain seated balance for therex, decreased core strength  - challenged with seated TA and GS, ball/band, without compensatory UT use and valsalva    Pain Level (0-10 scale) post treatment: 8/10     ASSESSMENT/Changes in Function:    Patient with fair tolerance to treatment session with focus on diaphragmatic breathing and isolating core and glute ms while in seated position to improve seated balance/tolerance. Max challenge to perform without UT compensation and valsalva. Patient continues to c/o increased pain in right SIJ/glute region. She had left upslip and possible left PI rotation, difficulty palpating pelvis d/t increased body habitus and adipose tissue, also TTP. Patient stated she has a diagnoses LLD with left leg shorter than right leg, she has a heel lift that was helpful when she was walking. Patient will continue to benefit from skilled PT services to modify and progress therapeutic interventions, address functional mobility deficits, address ROM deficits, address strength deficits, analyze and address soft tissue restrictions, analyze and cue movement patterns, analyze and modify body mechanics/ergonomics and assess and modify postural abnormalities to attain remaining goals. [x]  See Plan of Care  []  See progress note/recertification  []  See Discharge Summary         Progress towards goals / Updated goals:  1. Pt will be independent with HEP to maintain progression. Eval status: given and reviewed HEP  Current: MET (3/1/22)     Long term goals: To be accomplished within 4 weeks  1. Pt will improve FOTO score by 21 points to 37/100 to show improvement with functional mobility performance. Eval status: 16                2. Pt will report at least 50% improvement with back pain to improve her QOL. Eval status: \"10/10\"                3. Pt will be able to perform sit to stand mod Ind to improve ease with ADLs/transfer.   Eval status: unable to perform due to weakness and fear of falling                4. Pt will be able to amb 50ft at least with RW, AFO and min A to improve ease with ADLs/transfer.   Eval status: WC bound since 2018    PLAN  [x]  Upgrade activities as tolerated     [x]  Continue plan of care  []  Update interventions per flow sheet       []  Discharge due to:_  []  Other:_      SERG Champion 3/9/2022  11:15 AM    Future Appointments   Date Time Provider Stanley Mayer   3/9/2022 10:30 AM Maria M Echavarria KTPLVUR [facility]   3/10/2022  3:20 PM Marisa Thomson SCHED   3/15/2022 11:15 AM Newt Phoenix, PT [id number] [facility]   3/17/2022 12:00 PM Maria M Echavarria [id number] [facility]   3/18/2022  8:20 AM Matias CINTRON DO [facility] BS AMB   3/18/2022  8:45 AM CSI, PACER [facility] BS AMB   3/30/2022 10:20 AM [facility] NURSE [facility] JAYMIE LEVY   4/20/2022  2:00 PM Naveen Amado   5/18/2022  2:45 PM Reginaldo Newman, [address]

## 2022-03-15 ENCOUNTER — HOSPITAL ENCOUNTER (OUTPATIENT)
Dept: PHYSICAL THERAPY | Age: 72
Discharge: HOME | End: 2022-03-15
Payer: MEDICARE

## 2022-03-15 PROCEDURE — 97110 THERAPEUTIC EXERCISES: CPT

## 2022-03-15 NOTE — PROGRESS NOTES
PT DAILY TREATMENT NOTE     Patient Name: Flex Pimentel  Date:3/15/2022  : 1950  [x]  Patient  Verified  Payor: Pamela Lay / Plan: [facility] MEDICARE COMPLETE / Product Type: Managed Care Medicare /    In time: 11:18  Out time: 12:00  Total Treatment Time (min): 42  Visit #: 6 of     Medicare/BCBS Only   Total Timed Codes (min):  42 1:1 Treatment Time:  42       Treatment Area: Other low back pain [M54.59]    SUBJECTIVE  Pain Level (0-10 scale): 7-8/10- Low back; Right UT- 7-8/10  Any medication changes, allergies to medications, adverse drug reactions, diagnosis change, or new procedure performed?: [x] No    [] Yes (see summary sheet for update)  Subjective functional status/changes:   [] No changes reported  Patient stated that she had a hard time sleeping last night because of pain. Patient denies any incident or injuries that would have caused increased pain. OBJECTIVE    42 min Therapeutic Exercise:  [x] See flow sheet :   Rationale: increase ROM and increase strength to improve the patients ability to perform ADLs safely. With   [] TE   [] TA   [] neuro   [] other: Patient Education: [x] Review HEP    [] Progressed/Changed HEP based on:   [] positioning   [] body mechanics   [] transfers   [] heat/ice application    [] other:      Other Objective/Functional Measures: Patient attempted to lay supine with just pillows under head, but was too uncomfortable so had patient sit up. Patient was on a bed that could not be manually moved into a recline position. Had patient perform the exercises in sitting to avoid exacerbating pain. Was not able to assess pelvic alignment due to patient not tolerating supine position today. Pain Level (0-10 scale) post treatment: 7-8/10     ASSESSMENT/Changes in Function: Therapist provided cues for correct technique and muscle activation. Noted difficulty with hamstring activation with seated HS curl (without resistance).  Patient was able to perform slide board transfer with set up assistance, but required increased time. Patient reported no increase in pain at end of the session. Patient stated her goal is to be able to stand again. Patient will continue to benefit from skilled PT services to modify and progress therapeutic interventions, address functional mobility deficits, address ROM deficits, address strength deficits, analyze and address soft tissue restrictions, analyze and cue movement patterns, analyze and modify body mechanics/ergonomics, assess and modify postural abnormalities and address imbalance/dizziness to attain remaining goals. []  See Plan of Care  []  See progress note/recertification  []  See Discharge Summary         Progress towards goals / Updated goals:  1. Pt will be independent with HEP to maintain progression. Eval status: given and reviewed HEP  Current: MET (3/1/22)     Long term goals: To be accomplished within 4 weeks  1. Pt will improve FOTO score by 21 points to 37/100 to show improvement with functional mobility performance. Eval status: 16                2. Pt will report at least 50% improvement with back pain to improve her QOL. Eval status: \"10/10\"                6. Pt will be able to perform sit to stand mod Ind to improve ease with ADLs/transfer. Eval status: unable to perform due to weakness and fear of falling                4. Pt will be able to amb 50ft at least with RW, AFO and min A to improve ease with ADLs/transfer.   Eval status: WC bound since 2018       PLAN  []  Upgrade activities as tolerated     [x]  Continue plan of care  []  Update interventions per flow sheet       []  Discharge due to:_  []  Other:_      Claudy Lopez, PT 3/15/2022  11:21 AM    Future Appointments   Date Time Provider Stanley Mayer   3/17/2022 12:00 [facility] [address]   3/18/2022  8:20 AM Isaias Ibrahim DO [facility] BS AMB   3/18/2022  8:45 AM CSI, PACER HV [facility] BS AMB   3/30/2022 10:20 AM [address]    4/20/2022  2:00 PM [facility] NURSE JAJA LEVY   5/18/2022  2:45 PM Vaibhav Peñaloza, [address] [place] [address]

## 2022-03-17 ENCOUNTER — HOSPITAL ENCOUNTER (OUTPATIENT)
Dept: PHYSICAL THERAPY | Age: 72
Discharge: HOME | End: 2022-03-17
Payer: MEDICARE

## 2022-03-17 PROCEDURE — 97530 THERAPEUTIC ACTIVITIES: CPT

## 2022-03-17 PROCEDURE — 97110 THERAPEUTIC EXERCISES: CPT

## 2022-03-17 NOTE — PROGRESS NOTES
[facility]  [employer] [facility] NAZARIO [address]  [phone] fax    Continued Plan of Care/ Re-certification for Physical Therapy Services    Patient name: Cinthya Hawley Start of Care: 2022   Referral source: Samuel Townsend MD : 1950   Medical/Treatment Diagnosis: Other low back pain [M54.59]  Payor: [address] [place] [address] / Plan: [address] / Product Type: [employer] Medicare /  Onset Date: aggravated about several weeks ago     Prior Hospitalization: see medical history Provider#: [id number]   Medications: Verified on Patient Summary List    Comorbidities: heart disease, arthritis, diabetes, pacemaker, HTN, latex allergy, strok, gall bladder surgery, Right heel surgery  Prior Level of Function: mod ind with ADLs, using WC since 2018    Visits from Start of Care: 7    Missed Visits: 1    The Plan of Care and following information is based on the patient's current status:  1. Pt will be independent with HEP to maintain progression. Eval status: given and reviewed HEP  Current: MET (3/1/22)     Long term goals: To be accomplished within 4 weeks  1. Pt will improve FOTO score by 21 points to 37/100 to show improvement with functional mobility performance. Eval status: 16  Current Status: not met, 18/100 (+2pts)                2. Pt will report at least 50% improvement with back pain to improve her QOL. Eval status: 10/10  Current Status: met, 50%                3. Pt will be able to perform sit to stand mod Ind to improve ease with ADLs/transfer. Eval status: unable to perform due to weakness and fear of falling  Current Status: not met, 3 attempts with 2 person assist, unable                 4. Pt will be able to amb 50ft at least with RW, AFO and min A to improve ease with ADLs/transfer.   Eval status: WC bound since   Current Status: not met    Key functional changes: increased mobility, increased strength, improved ease with transfers: getting in/out bed and on/off commode. Problems/ barriers to goal attainment: fear avoidance    Problem List: pain affecting function, decrease ROM, decrease strength, edema affecting function, impaired gait/ balance, decrease ADL/ functional abilitiies, decrease activity tolerance, decrease flexibility/ joint mobility and decrease transfer abilities    Treatment Plan: Therapeutic exercise, Therapeutic activities, Neuromuscular re-education, Physical agent/modality, Gait/balance training, Manual therapy, Patient education, Self Care training, Functional mobility training, Home safety training and Stair training    Patient Goal (s) has been updated and includes: \"be able to independently walk, maybe with a walker\"    Goals for this certification period to be accomplished in 4 weeks:  1. Pt will be independent with updated HEP to maintain progression. Status at last Recert: new goal  Current:      2. Pt will improve FOTO score by 21 points to 37/100 to show improvement with functional mobility performance. Status at last Recert: 18/100  Current Status:                 3. Pt will report at least 75% improvement with back pain to improve her QOL. Status at last Recert: 50%  Current Status:                 4. Pt will be able to perform sit to stand with Min A to improve ease with ADLs/transfer. Status at last Recert: not met, 3 attempts with Dep A x 2, with fear of falling   Current Status:                 5. Pt will be able to perform bed mobility including scooting to EOB with Mod I to improve ease with ADLs/transfer. Status at last Recert: new goal  Current Status:     Frequency / Duration: Patient to be seen 2-3 times per week for 4 weeks:    Assessment / Recommendations: Pt has been making slow progress in therapy since their IE on 2/16/22.  Pt has been seen for 7 visits and therapy has included: therapeutic exercise, therapeutic activity, neuromuscular re-education, manual therapy for pelvic alignment, patient education and HEP. She is limited with progress partially due to fear avoidance of falling. She is also limited with progress due to right SIJ pain preventing her from being able lie in proper position to correct alignment. Exercise has mainly been performed in seated position.       Pt reports 50% overall improvement with functional ADL's since beginning PT. Pt's pain range 5-8/10. Patient reports the following functional improvements in therapy: increased mobility, increased strength, improved ease with transfers: getting in/out bed and on/off commode. Patient continues to need work on: decreasing pain, increasing strength, increase mobility. Certification Period: 3/18/22-4/16/22    April Cazares Collin  3/17/2022  2:16 PM    ________________________________________________________________________  I certify that the above Therapy Services are being furnished while the patient is under my care. I agree with the treatment plan and certify that this therapy is necessary. [] I have read the above and request that my patient continue as recommended.   [] I have read the above report and request that my patient continue therapy with the following changes/special instructions: _______________________________________  [] I have read the above report and request that my patient be discharged from therapy    Physician's Signature:____________Date:_________TIME:________     Lola Wilde MD  ** Signature, Date and Time must be completed for valid certification **    Please sign and return to [facility]  [address] [facility] [employer] [facility] NAZARIO  [facility] [place]  [phone] fax

## 2022-03-17 NOTE — PROGRESS NOTES
PT DAILY TREATMENT NOTE     Patient Name: Saroj Powell  Date:3/17/2022  : 1950  [x]  Patient  Verified  Payor: Tavares Scott / Plan: [facility] MEDICARE COMPLETE / Product Type: Managed Care Medicare /    In time: 12:02  Out time: 12:50  Total Treatment Time (min): 48  Visit #: 7 of     Medicare/BCBS Only   Total Timed Codes (min):  48 1:1 Treatment Time:  48       Treatment Area: Other low back pain [M54.59]    SUBJECTIVE  Pain Level (0-10 scale): 7/10 l/s and right SIJ  Any medication changes, allergies to medications, adverse drug reactions, diagnosis change, or new procedure performed?: [x] No    [] Yes (see summary sheet for update)  Subjective functional status/changes:   [] No changes reported  Patient reports she is fearful of trying to stand and of falling. OBJECTIVE    38 min Therapeutic Exercise:  [x] See flow sheet :   Rationale: increase ROM and increase strength to improve the patients ability to perform ADLs safely. 10 min Therapeutic Activity:  [x] See flow sheet : sit to stand x 3 attempts with Dependent A x 2   Rationale: increase ROM and increase strength to improve the patients ability to perform ADLs safely. With   [] TE   [x] TA   [] neuro   [] other: Patient Education: [] Review HEP    [x] Progressed/Changed HEP based on: added SL Press with GTB, DF and PF with GTB  [] positioning   [] body mechanics   [x] transfers   [] heat/ice application    [x] other: strengthening of LEs and glutes to assist with sit to stands     Other Objective/Functional Measures:   Sit to Stand: 3 attempts with Dep A x 2, unable, pt fearful and unable to scoot to edge of bed or weightbear through LEs - added GTB single leg press and ankle DF/PFto both program and HEP    FOTO: 18/100 (+2)    Pain Level (0-10 scale) post treatment: 7/10     ASSESSMENT/Changes in Function: SEE VLAD  Pt has been making slow progress in therapy since their IE on 22.  Pt has been seen for 7 visits and therapy has included: therapeutic exercise, therapeutic activity, neuromuscular re-education, manual therapy for pelvic alignment, patient education and HEP. Pt reports 50% overall improvement with functional ADL's since beginning PT. Pt's pain range 5-8/10. Patient reports the following functional improvements in therapy: increased mobility, increased strength, improved ease with transfers: getting in/out bed and on/off commode. Patient continues to need work on: decreasing pain, increasing strength, increase mobility. Patient's main goal for continuing therapy is: \"be able to independently walk, maybe with a walker\"    Patient will continue to benefit from skilled PT services to modify and progress therapeutic interventions, address functional mobility deficits, address ROM deficits, address strength deficits, analyze and address soft tissue restrictions, analyze and cue movement patterns, analyze and modify body mechanics/ergonomics, assess and modify postural abnormalities and address imbalance/dizziness to attain remaining goals. []  See Plan of Care  [x]  See progress note/recertification  []  See Discharge Summary         Progress towards goals / Updated goals:  1. Pt will be independent with HEP to maintain progression. Eval status: given and reviewed HEP  Current: MET (3/1/22)     Long term goals: To be accomplished within 4 weeks  1. Pt will improve FOTO score by 21 points to 37/100 to show improvement with functional mobility performance. Eval status: 16  Current Status: not met, 18/100 (+2pts)                2. Pt will report at least 50% improvement with back pain to improve her QOL. Eval status: 10/10  Current Status: met, 50%                3. Pt will be able to perform sit to stand mod Ind to improve ease with ADLs/transfer.   Eval status: unable to perform due to weakness and fear of falling  Current Status: not met, 3 attempts with 2 person assist, unable               4. Pt will be able to amb 50ft at least with RW, AFO and min A to improve ease with ADLs/transfer.   Eval status: WC bound since 2018  Current Status: not met     PLAN  []  Upgrade activities as tolerated     [x]  Continue plan of care  []  Update interventions per flow sheet       []  Discharge due to:_  [x]  Other:_ continue therapy 2-3 times per week for 4 weeks    SERG Castillo 3/17/2022  12:50 PM    Future Appointments   Date Time Provider Stanley Mayer   3/17/2022 12:00 PM Channing Craig [id number] [facility]   3/18/2022  8:20 AM Pérez Miller DO [address] Raquel Reyes BS AMB   3/18/2022  8:45 AM CSI, PACER [facility] AMB   3/30/2022 10:20 AM [facility] NURSE [facility] JAYMIE LEVY   4/20/2022  2:00 PM Naveen Amado   5/18/2022  2:40 PM Skyler Lloyd, [address]

## 2022-03-18 ENCOUNTER — CLINICAL SUPPORT (OUTPATIENT)
Dept: CARDIOLOGY CLINIC | Age: 72
End: 2022-03-18
Payer: MEDICARE

## 2022-03-18 ENCOUNTER — OFFICE VISIT (OUTPATIENT)
Dept: CARDIOLOGY CLINIC | Age: 72
End: 2022-03-18

## 2022-03-18 VITALS
SYSTOLIC BLOOD PRESSURE: 100 MMHG | OXYGEN SATURATION: 100 % | HEART RATE: 94 BPM | HEIGHT: 61 IN | DIASTOLIC BLOOD PRESSURE: 62 MMHG | BODY MASS INDEX: 44.59 KG/M2

## 2022-03-18 DIAGNOSIS — Z95.810 BIVENTRICULAR IMPLANTABLE CARDIOVERTER-DEFIBRILLATOR IN SITU: ICD-10-CM

## 2022-03-18 DIAGNOSIS — I11.0 HYPERTENSIVE HEART DISEASE WITH HEART FAILURE (HCC): ICD-10-CM

## 2022-03-18 DIAGNOSIS — I42.8 NONISCHEMIC CARDIOMYOPATHY (HCC): Primary | ICD-10-CM

## 2022-03-18 PROBLEM — L97.409 HEEL ULCER (HCC): Status: ACTIVE | Noted: 2018-10-17

## 2022-03-18 PROBLEM — R77.8 ELEVATED TROPONIN: Status: ACTIVE | Noted: 2019-12-12

## 2022-03-18 PROBLEM — R79.89 ELEVATED TROPONIN: Status: ACTIVE | Noted: 2019-12-12

## 2022-03-18 PROBLEM — L08.9 INFECTED WOUND: Status: ACTIVE | Noted: 2018-12-27

## 2022-03-18 PROBLEM — T14.8XXA INFECTED WOUND: Status: ACTIVE | Noted: 2018-12-27

## 2022-03-18 PROBLEM — E66.01 SEVERE OBESITY (BMI 35.0-39.9) WITH COMORBIDITY (HCC): Status: ACTIVE | Noted: 2018-05-24

## 2022-03-18 PROBLEM — K68.12 PSOAS ABSCESS, RIGHT (HCC): Status: ACTIVE | Noted: 2017-04-20

## 2022-03-18 PROBLEM — Z78.9 IMPAIRED MOBILITY AND ADLS: Status: ACTIVE | Noted: 2017-03-30

## 2022-03-18 PROBLEM — Z74.09 IMPAIRED MOBILITY AND ADLS: Status: ACTIVE | Noted: 2017-03-30

## 2022-03-18 PROBLEM — S30.1XXA PSOAS HEMATOMA, RIGHT, SECONDARY TO ANTICOAGULANT THERAPY: Status: ACTIVE | Noted: 2017-03-30

## 2022-03-18 PROCEDURE — 1101F PT FALLS ASSESS-DOCD LE1/YR: CPT | Performed by: INTERNAL MEDICINE

## 2022-03-18 PROCEDURE — G8510 SCR DEP NEG, NO PLAN REQD: HCPCS | Performed by: INTERNAL MEDICINE

## 2022-03-18 PROCEDURE — G8536 NO DOC ELDER MAL SCRN: HCPCS | Performed by: INTERNAL MEDICINE

## 2022-03-18 PROCEDURE — G8427 DOCREV CUR MEDS BY ELIG CLIN: HCPCS | Performed by: INTERNAL MEDICINE

## 2022-03-18 PROCEDURE — G8417 CALC BMI ABV UP PARAM F/U: HCPCS | Performed by: INTERNAL MEDICINE

## 2022-03-18 PROCEDURE — 93281 PM DEVICE PROGR EVAL MULTI: CPT | Performed by: INTERNAL MEDICINE

## 2022-03-18 PROCEDURE — 3017F COLORECTAL CA SCREEN DOC REV: CPT | Performed by: INTERNAL MEDICINE

## 2022-03-18 PROCEDURE — 99214 OFFICE O/P EST MOD 30 MIN: CPT | Performed by: INTERNAL MEDICINE

## 2022-03-18 PROCEDURE — G8400 PT W/DXA NO RESULTS DOC: HCPCS | Performed by: INTERNAL MEDICINE

## 2022-03-18 PROCEDURE — 1090F PRES/ABSN URINE INCON ASSESS: CPT | Performed by: INTERNAL MEDICINE

## 2022-03-18 NOTE — PROGRESS NOTES
Josephine Ham    f/u HFmrEF, ICD, preop    HPI    Josephine Ham is a 70 y.o. -American female with history of a dilated cardiomyopathy with mild left ventricular dysfunction and an ejection fraction in the 40 to 45% range with widely patent coronary arteries and a cardiac catheterization back in August 1996. An echocardiogram completed on May 5, 2017 suggested an ejection fraction of 50%.  She did have a biventricular AICD placed but due to trauma and infection in that area she had that removed and now she has a biventricular pacemaker with pulse generator residing in her left upper quadrant placed back in October 2012.     Unfortunately, she fell at home and traumatized her back developing an infection of her right Psoas muscle hematoma with development of epidural abscess with neurologic compromise resulting in paraplegia back in April 2017.     She had her pacemaker generator changed by Dr. Veronica Mcgee on February 26, 2019. Yvette Cabrales should be noted that she had an infected right foot and was on antibiotics for for some time before the generator change and she is now off antibiotics and foot is now healing by secondary intention and apparently is doing well according to the patient.      She has had some problems with chronic heart failure for which we have tried to adjust her diuretics. Her last echocardiogram was completed on August 17, 2020 demonstrated a left ventricular ejection fraction of 25 to 30% which was slightly improved from past studies and suggested some benefit from her ongoing Entresto therapy.  She does have stage III chronic kidney disease which makes more aggressive diuresis a little challenging.       Past Medical History:   Diagnosis Date    Acute paraplegia ([facility] 4/20/2017    Benign hypertensive heart disease with systolic CHF, NYHA class 2 ([address] 9/5/2012    Biventricular implantable cardioverter-defibrillator in situ 04/28/2005    Upgraded to BiV AICD; gen change 4/2008; pocket revision 10/2009; Abdominal - done on 8/22/2012 by Dr. Jarvis Para Cardiac cath 08/15/1996    Patent coronaries. Elev LVEDP. EF 50-55%.  Cardiac echocardiogram 06/23/2015    Ltd study. EF 45-50%. Mild, diffuse hypk. Severe apical hypk. No mass or thrombus was clearly identified, although imaging was suboptimal.      Cardiac nuclear imaging test 06/19/2015    Fixed distal apical, distal septal defect more likely due to RV pacing than prior infarct. No ischemia. EF 46%. RWMA c/w RV pacing. Nondiagnostic EKG on pharm stress test.      Cardiovascular lower extremity venous duplex 09/04/2012    Acute, non-occlusive DVT in CFV on right. No DVT on left. No superficial thrombosis bilaterally.  Cardiovascular upper extremity venous duplex 08/27/2012    DVT in axillary vein on left. Left subclavian was not visualized.     Chronic anemia 9/5/2012    Chronic systolic heart failure (HCC)     Decreased calculated glomerular filtration rate (GFR) 3/30/2017    Calculated GFR equivalent to that of CKD stage 3 = 30-59 ml/min    Diabetic neuropathy associated with type 2 diabetes mellitus ([address] 6/28/2011    Difficult airway for intubation 08/22/2012    see anesthesia airway note    Dyslipidemia 6/28/2011    Gout     History of complete heart block 06/28/2011    History of Coumadin therapy     Anticoagulation for DVT of the LUE; Discontinued on 3/30/2017    History of deep venous thrombosis 9/5/2012    Left upper extremity    History of pyelonephritis 3/30/2017    Left bundle branch block (LBBB) on electrocardiogram 06/28/2011    Nonischemic cardiomyopathy ([address] 6/28/2011    Obesity (BMI 35.0-39.9 without comorbidity) 3/13/2017    Obstructive sleep apnea on CPAP 2/7/2012    Psoas abscess, right ([address] 4/20/2017    Psoas hematoma, right, secondary to anticoagulant therapy 3/30/2017    Type 2 diabetes mellitus with diabetic neuropathy ([address] 6/28/2011       Past Surgical History:   Procedure Laterality Date    COLONOSCOPY N/A 10/5/2021    COLONOS[facility] Blanca [facility]barran [facility] 9  4/07    right     HX CHOLECYSTECTOMY  1994    HX HYSTERECTOMY  1973    HX OTHER SURGICAL  6/11/2012    AICD revision    HX PACEMAKER  4/28/2005    [employer] AICD    AK REMVL PERM PM PLS GEN W/REPL PLSE GEN 2 LEAD SYS N/A 2/26/2019    REMOVE & REPLACE PPM GEN DUAL LEAD performed by Ghazal Nielson MD at [facility]       Current Outpatient Medications   Medication Sig Dispense Refill    Entresto  mg tablet TAKE 1 TABLET BY MOUTH TWICE A  Tablet 3    furosemide (LASIX) 40 mg tablet TAKE 1 AND 1/2 TABLETS BY MOUTH DAILY 135 Tablet 3    doxycycline (ADOXA) 100 mg tablet Take 1 Tablet by mouth two (2) times a day. 14 Tablet 0    metOLazone (ZAROXOLYN) 2.5 mg tablet TAKE 1 TABLET BY MOUTH TWICE WEEKLY 8 Tablet 3    carvediloL (COREG) 6.25 mg tablet TAKE 1 TABLET BY MOUTH TWICE A  Tablet 3    fosfomycin (MONUROL) 3 gram pack oral packet Take 1 Packet by mouth every three (3) days. 3 Packet 0    diazePAM (Valium) 10 mg tablet Take 1 Tablet by mouth every six (6) hours as needed for Anxiety. Max Daily Amount: 40 mg. (Patient not taking: Reported on 10/5/2021) 12 Tablet 1    hydrOXYzine HCL (ATARAX) 25 mg tablet TAKE 1 TABLET BY MOUTH EVERY DAY      VITAMIN D3-VITAMIN K2, MK4, PO Take 1 Tab by mouth daily.  cyanocobalamin/folic acid (vitamin O15-QIXEL acid) 5,861-035 mcg lozg Take 2 Tabs by mouth daily.  ZINC GLUCONATE PO Take 25 mg by mouth daily.  magnesium hydroxide (Camara Milk of Magnesia) 400 mg/5 mL suspension Take 5 mL by mouth as needed for Constipation.  insulin glargine (LANTUS U-100 INSULIN) 100 unit/mL injection 10 Units by SubCUTAneous route nightly. Indications: type 2 diabetes mellitus 10 Vial 0    exenatide microspheres (BYDUREON) 2 mg serr 2 mg by SubCUTAneous route every seven (7) days.       multivitamin (ONE A DAY) tablet Take 1 Tab by mouth daily.  metFORMIN ER (GLUCOPHAGE XR) 500 mg tablet Take 500 mg by mouth two (2) times daily (with meals).  cholecalciferol, VITAMIN D3, (VITAMIN D3) 5,000 unit tab tablet Take 5,000 Units by mouth daily. take 1 tab daily of vitamin D3, 5000 units      gabapentin (NEURONTIN) 300 mg capsule Take 2 Caps by mouth two (2) times a day. Indications: NEUROPATHIC PAIN 100 Cap 0       Allergies   Allergen Reactions    Latex Itching    Vancomycin Itching    Ampicillin Itching    Bactrim [Sulfamethoxazole-Trimethoprim] Itching    Blueberry Swelling     Causes throat swelling    Ciprofloxacin Itching    Codeine Other (comments)     Jumpy feeling    Crestor [Rosuvastatin] Itching    Darvocet A500 [Propoxyphene N-Acetaminophen] Itching    Demerol [Meperidine] Itching    Levaquin [Levofloxacin] Itching    Lipitor [Atorvastatin] Myalgia    Magnesium Oxide Itching     nausea    Minocin [Minocycline] Itching    Pcn [Penicillins] Itching    Pravachol [Pravastatin] Swelling     Swelling in mouth.    Not allergic per patient, takes at home    Shellfish Derived Swelling     itching    Sulfa (Sulfonamide Antibiotics) Itching    Ultracet [Tramadol-Acetaminophen] Itching    Vicodin [Hydrocodone-Acetaminophen] Itching    Vytorin 10-10 [Ezetimibe-Simvastatin] Myalgia    Percodan [Oxycodone Hcl-Oxycodone-Asa] Itching       Social History     Socioeconomic History    Marital status:      Spouse name: Not on file    Number of children: Not on file    Years of education: Not on file    Highest education level: Not on file   Occupational History    Not on file   Tobacco Use    Smoking status: Never Smoker    Smokeless tobacco: Never Used   Vaping Use    Vaping Use: Never used   Substance and Sexual Activity    Alcohol use: Never    Drug use: Never    Sexual activity: Not Currently     Partners: Male   Other Topics Concern    Not on file   Social History Narrative    Not on file     Social Determinants of Health     Financial Resource Strain:     Difficulty of Paying Living Expenses: Not on file   Food Insecurity:     Worried About Running Out of Food in the Last Year: Not on file    Nathan of Food in the Last Year: Not on file   Transportation Needs:     Lack of Transportation (Medical): Not on file    Lack of Transportation (Non-Medical): Not on file   Physical Activity:     Days of Exercise per Week: Not on file    Minutes of Exercise per Session: Not on file   Stress:     Feeling of Stress : Not on file   Social Connections:     Frequency of Communication with Friends and Family: Not on file    Frequency of Social Gatherings with Friends and Family: Not on file    Attends Congregation Services: Not on file    Active Member of [address] [place] [address] [employer] or Organizations: Not on file    Attends Club or Organization Meetings: Not on file    Marital Status: Not on file   Intimate Partner Violence:     Fear of Current or Ex-Partner: Not on file    Emotionally Abused: Not on file    Physically Abused: Not on file    Sexually Abused: Not on file   Housing Stability:     Unable to Pay for Housing in the Last Year: Not on file    Number of [place] in the Last Year: Not on file    Unstable Housing in the Last Year: Not on file        FH: n/a    Review of Systems    14 pt Review of Systems is negative unless otherwise mentioned in the HPI. Wt Readings from Last 3 Encounters:   11/05/21 107 kg (236 lb)   10/14/21 107 kg (236 lb)   10/01/21 107 kg (236 lb)     Temp Readings from Last 3 Encounters:   01/28/22 97.5 °F (36.4 °C)   01/07/22 97.3 °F (36.3 °C)   11/05/21 98.5 °F (36.9 °C)     BP Readings from Last 3 Encounters:   03/18/22 100/62   10/05/21 134/77   09/09/21 120/64     Pulse Readings from Last 3 Encounters:   03/18/22 94   10/05/21 82   09/09/21 (!) 54       08/17/20   ECHO ADULT COMPLETE 08/17/2020 8/17/2020    Narrative · Contrast used: DEFINITY.   · Echo study was technically difficulty. · LV: Estimated LVEF is 25 - 30%. Visually measured ejection fraction. Normal cavity size. Mildly increased wall thickness. Severely reduced   systolic function. Mild (grade 1) left ventricular diastolic dysfunction. · IVC: Moderately elevated central venous pressure (10-15 mmHg); IVC   diameter is larger than 21 mm and collapses more than 50% with   respiration. · PA: Pulmonary arterial systolic pressure is 34 mmHg. Pulmonary   hypertension not suggested by Doppler findings. · LA: Left Atrium volume index is 28 mL/m2. · RV: Dilated right ventricle. Reduced systolic function. · RA: Dilated right atrium. Signed by: Meagan Epstein MD       Physical Exam:    Visit Vitals  /62 (BP 1 Location: Right upper arm, BP Patient Position: Sitting, BP Cuff Size: Adult)   Pulse 94   Ht 5' 1\" (1.549 m)   LMP  (LMP Unknown)   SpO2 100%   BMI 44.59 kg/m²      Physical Exam  HENT:      Head: Normocephalic and atraumatic. Eyes:      Pupils: Pupils are equal, round, and reactive to light. Cardiovascular:      Rate and Rhythm: Normal rate and regular rhythm. Heart sounds: Normal heart sounds. No murmur heard. No friction rub. No gallop. Comments: No JVD, JVP just above clavicle with pt at 90 degrees  Pulmonary:      Effort: Pulmonary effort is normal. No respiratory distress. Breath sounds: Normal breath sounds. No wheezing or rales. Chest:      Chest wall: No tenderness. Abdominal:      General: Bowel sounds are normal.      Palpations: Abdomen is soft. Musculoskeletal:         General: No tenderness. Right lower le+ Pitting Edema present. Left lower le+ Pitting Edema present. Skin:     General: Skin is warm and dry. Neurological:      Mental Status: She is alert and oriented to person, place, and time.          EKG today shows: NSR, normal axis and intervals, no ST segment abnormalities    Lab Results   Component Value Date/Time    Sodium 141 08/31/2021 02:50 PM    Potassium 3.9 08/31/2021 02:50 PM    Chloride 108 08/31/2021 02:50 PM    CO2 28 08/31/2021 02:50 PM    Anion gap 5 08/31/2021 02:50 PM    Glucose 96 08/31/2021 02:50 PM    BUN 30 (H) 08/31/2021 02:50 PM    Creatinine 1.92 (H) 08/31/2021 02:50 PM    BUN/Creatinine ratio 16 08/31/2021 02:50 PM    GFR est AA 31 (L) 08/31/2021 02:50 PM    GFR est non-AA 26 (L) 08/31/2021 02:50 PM    Calcium 9.0 08/31/2021 02:50 PM    Bilirubin, total 0.7 08/31/2021 02:50 PM    Alk.  phosphatase 94 08/31/2021 02:50 PM    Protein, total 7.4 08/31/2021 02:50 PM    Albumin 3.6 08/31/2021 02:50 PM    Globulin 3.8 08/31/2021 02:50 PM    A-G Ratio 0.9 08/31/2021 02:50 PM    ALT (SGPT) 32 08/31/2021 02:50 PM    AST (SGOT) 26 08/31/2021 02:50 PM     Lab Results   Component Value Date/Time    WBC 6.9 08/31/2021 02:50 PM    Hemoglobin, POC 7.8 (L) 08/22/2012 09:30 AM    HGB 11.9 (L) 08/31/2021 02:50 PM    Hematocrit, POC 23 (L) 08/22/2012 09:30 AM    HCT 34.9 (L) 08/31/2021 02:50 PM    PLATELET 821 01/55/0092 02:50 PM    MCV 81.4 08/31/2021 02:50 PM     Lab Results   Component Value Date/Time    Cholesterol, total 127 08/17/2018 02:40 AM    HDL Cholesterol 42 08/17/2018 02:40 AM    LDL, calculated 61.6 08/17/2018 02:40 AM    VLDL, calculated 23.4 08/17/2018 02:40 AM    Triglyceride 117 08/17/2018 02:40 AM    CHOL/HDL Ratio 3.0 08/17/2018 02:40 AM     Lab Results   Component Value Date/Time    TSH 0.29 (L) 12/14/2019 12:55 PM     Lab Results   Component Value Date/Time    Hemoglobin A1c 5.8 (H) 12/14/2019 01:44 AM         Impression and Plan:  Merari Clay is a 70 y.o. with:    1.) AECHF/ HFmrEF 40-45%, with +LE edema (but JVD normal)  2.) ICD, with h/o infection, device checked today  3.) DM2, well controlled  4.) HTN, at goal  5.) CKD 3, SCr baseline ~1.4  6.) Abn TSH?  7.) H/o recurrent infections  8.) Poor functional status/ Paraplegia    1.) Ok to continue Lasix 60 mg daily, but on \"bad days\" takes 80 mg  2.) Will continue Metolazone 2.5 mg 2x/week, I trailed this back in Feb and she felt it helped her  3.) Has historically not needed K replacement, but will need to keep an eye  4.) RTC 6 months with device check    Lost another family member   going through cancer dx  But CV status is stable  35 mins spent    Thank you for allowing me to participate in the care of your patient, please do not hesitate to call with questions or concerns. Follow-up and Dispositions    · Return in about 6 months (around 9/18/2022).      [address] [facility] [address],    [place], DO

## 2022-03-18 NOTE — PROGRESS NOTES
Anhosvaldo Guille presents today for   Chief Complaint   Patient presents with    Follow-up     6 month follow up     Leg Swelling     only in left foot and ankle        Freddy French preferred language for health care discussion is english/other. Is someone accompanying this pt? no    Is the patient using any DME equipment during [address]? Electrical wheelchair     Depression Screening:  3 most recent PHQ Screens 3/18/2022   Little interest or pleasure in doing things Not at all   Feeling down, depressed, irritable, or hopeless Not at all   Total Score PHQ 2 0       Learning Assessment:  Learning Assessment 3/15/2021   PRIMARY LEARNER Patient   HIGHEST LEVEL OF EDUCATION - PRIMARY LEARNER  -   BARRIERS PRIMARY LEARNER -   CO-LEARNER CAREGIVER -   PRIMARY LANGUAGE ENGLISH   LEARNER PREFERENCE PRIMARY DEMONSTRATION   ANSWERED BY patient   RELATIONSHIP SELF       Abuse Screening:  Abuse Screening Questionnaire 3/18/2022   Do you ever feel afraid of your partner? N   Are you in a relationship with someone who physically or mentally threatens you? N   Is it safe for you to go home? Y       Fall Risk  Fall Risk Assessment, last 12 mths 3/18/2022   Able to walk? Yes   Fall in past 12 months? 0   Do you feel unsteady? 0   Are you worried about falling 0       Pt currently taking Anticoagulant therapy? no    Coordination of Care:  1. Have you been to the ER, urgent care clinic since your last visit? Hospitalized since your last visit? no    2. Have you seen or consulted any other health care providers outside of the [address] [place] [address] since your last visit? Include any pap smears or colon screening.  no

## 2022-03-19 PROBLEM — E66.9 OBESITY (BMI 35.0-39.9 WITHOUT COMORBIDITY): Status: ACTIVE | Noted: 2017-03-13

## 2022-03-19 PROBLEM — M86.9 FOOT OSTEOMYELITIS, RIGHT (HCC): Status: ACTIVE | Noted: 2018-08-16

## 2022-03-19 PROBLEM — E66.9 OBESITY (BMI 30.0-34.9): Status: ACTIVE | Noted: 2018-05-04

## 2022-03-19 PROBLEM — A41.9 SEPSIS (HCC): Status: ACTIVE | Noted: 2017-04-20

## 2022-03-19 PROBLEM — E11.42 DIABETIC POLYNEUROPATHY ASSOCIATED WITH TYPE 2 DIABETES MELLITUS (HCC): Status: ACTIVE | Noted: 2019-03-26

## 2022-03-19 PROBLEM — A49.1 GROUP B STREPTOCOCCAL INFECTION: Status: ACTIVE | Noted: 2017-04-20

## 2022-03-19 PROBLEM — B95.2 URINARY TRACT INFECTION DUE TO ENTEROCOCCUS: Status: ACTIVE | Noted: 2017-04-20

## 2022-03-19 PROBLEM — M86.9 OSTEOMYELITIS (HCC): Status: ACTIVE | Noted: 2018-08-16

## 2022-03-19 PROBLEM — Z87.448 HISTORY OF PYELONEPHRITIS: Status: ACTIVE | Noted: 2017-03-30

## 2022-03-19 PROBLEM — Z99.3 WHEELCHAIR DEPENDENCE: Status: ACTIVE | Noted: 2020-03-05

## 2022-03-19 PROBLEM — N39.0 URINARY TRACT INFECTION DUE TO ENTEROCOCCUS: Status: ACTIVE | Noted: 2017-04-20

## 2022-03-19 PROBLEM — S70.10XA ILIOPSOAS MUSCLE HEMATOMA: Status: ACTIVE | Noted: 2017-03-30

## 2022-03-19 PROBLEM — G82.20 ACUTE PARAPLEGIA (HCC): Status: ACTIVE | Noted: 2017-04-20

## 2022-03-19 PROBLEM — Z97.8 FOLEY CATHETER IN PLACE ON ADMISSION: Status: ACTIVE | Noted: 2017-04-20

## 2022-03-19 PROBLEM — R33.9 URINARY RETENTION: Status: ACTIVE | Noted: 2019-09-17

## 2022-03-19 PROBLEM — L97.412: Status: ACTIVE | Noted: 2020-03-05

## 2022-03-19 PROBLEM — I50.23 SYSTOLIC CHF, ACUTE ON CHRONIC (HCC): Status: ACTIVE | Noted: 2019-12-13

## 2022-03-19 PROBLEM — R94.4 DECREASED CALCULATED GLOMERULAR FILTRATION RATE (GFR): Status: ACTIVE | Noted: 2017-03-30

## 2022-03-19 PROBLEM — E11.21 TYPE 2 DIABETES WITH NEPHROPATHY (HCC): Status: ACTIVE | Noted: 2018-03-12

## 2022-03-19 PROBLEM — E66.811 OBESITY (BMI 30.0-34.9): Status: ACTIVE | Noted: 2018-05-04

## 2022-03-19 PROBLEM — R06.00 DYSPNEA: Status: ACTIVE | Noted: 2019-12-12

## 2022-03-20 PROBLEM — E87.70 HYPERVOLEMIA: Status: ACTIVE | Noted: 2017-06-06

## 2022-03-20 PROBLEM — N31.9 NEUROGENIC BLADDER: Status: ACTIVE | Noted: 2017-10-12

## 2022-03-20 PROBLEM — Z95.0 BIVENTRICULAR CARDIAC PACEMAKER IN SITU: Status: ACTIVE | Noted: 2017-09-13

## 2022-03-20 PROBLEM — S91.301A NON-HEALING WOUND OF RIGHT HEEL: Status: ACTIVE | Noted: 2019-03-26

## 2022-03-21 ENCOUNTER — HOSPITAL ENCOUNTER (OUTPATIENT)
Dept: PHYSICAL THERAPY | Age: 72
Discharge: HOME | End: 2022-03-21
Payer: MEDICARE

## 2022-03-21 PROCEDURE — 97530 THERAPEUTIC ACTIVITIES: CPT

## 2022-03-21 PROCEDURE — 97110 THERAPEUTIC EXERCISES: CPT

## 2022-03-21 NOTE — PROGRESS NOTES
PT DAILY TREATMENT NOTE     Patient Name: Yarely Vigil  Date:3/21/2022  : 1950  [x]  Patient  Verified  Payor: Theressa Mortimer / [facility] [place] [facility] / Product Type: Managed Care Medicare /    In time: 9:04  Out time: 9:48  Total Treatment Time (min): 44  Visit #: 1 of     Medicare/BCBS Only   Total Timed Codes (min):  44 1:1 Treatment Time:  44       Treatment Area: Other low back pain [M54.59]    SUBJECTIVE  Pain Level (0-10 scale): 6-7/10 l/s and right SIJ  Any medication changes, allergies to medications, adverse drug reactions, diagnosis change, or new procedure performed?: [x] No    [] Yes (see summary sheet for update)  Subjective functional status/changes:   [] No changes reported  Patient reports discomfort last night but that was the only night. She started her new exercises but she did not do them yesterday. OBJECTIVE    30 min Therapeutic Exercise:  [x] See flow sheet :   Rationale: increase ROM and increase strength to improve the patients ability to perform ADLs safely. 14 min Therapeutic Activity:  [x] See flow sheet : manual assistance for HS and piriformis stretches; tapping to assist with ms contraction to perform DF and PF; anterior weight shifting in seated to progress towards sit to stands   Rationale: increase ROM and increase strength to improve the patients ability to perform ADLs safely.       With   [x] TE   [] TA   [] neuro   [] other: Patient Education: [x] Review HEP    [] Progressed/Changed HEP based on:  [] positioning   [] body mechanics   [x] transfers   [] heat/ice application    [] other:      Other Objective/Functional Measures:   - max challenge with SL Leg Press with TB  - trace ms contraction with AROM PF/DF  - added forward weight shift in seated, 10 reps with stool, 10 reps reaching for floor  - unable to perform AROM HSC, modified to HS isometric with max challenge    Pain Level (0-10 scale) post treatment: -8/10 ASSESSMENT/Changes in Function:   Patient is making slow progress in therapy d/t significant global ms weakness and is limited by pain to allow for proper positioning into supine to allow for gravity minimized therex. Patient unable to perform AROM ankle DF/PF or HS curls in seated today. Increased pain with manual assistance with piriformis stretch, however pt tolerated for full hold time. Patient will continue to benefit from skilled PT services to modify and progress therapeutic interventions, address functional mobility deficits, address ROM deficits, address strength deficits, analyze and address soft tissue restrictions, analyze and cue movement patterns, analyze and modify body mechanics/ergonomics, assess and modify postural abnormalities and address imbalance/dizziness to attain remaining goals. []  See Plan of Care  [x]  See progress note/recertification  []  See Discharge Summary         Goals for this certification period to be accomplished in 4 weeks:  1. Pt will be independent with updated HEP to maintain progression. Status at last Recert: new goal  Current: Progressing. (3/21/22)     2. Pt will improve FOTO score by 21 points to 37/100 to show improvement with functional mobility performance. Status at last Recert: 18/100  Current Status:                 3. Pt will report at least 75% improvement with back pain to improve her QOL. Status at last Recert: 50%  Current Status:                 4. Pt will be able to perform sit to stand with Min A to improve ease with ADLs/transfer. Status at last Recert: not met, 3 attempts with Dep A x 2, with fear of falling   Current Status:                 9. Pt will be able to perform bed mobility including scooting to EOB with Mod I to improve ease with ADLs/transfer.   Status at last Recert: new goal  Current Status:      PLAN  []  Upgrade activities as tolerated     [x]  Continue plan of care  []  Update interventions per flow sheet       [] Discharge due to:_  []  Other:_    Collin Peoples 3/21/2022  9:48 AM    Future Appointments   Date Time Provider Stanley Leyla   3/21/2022  9:00 AM Jacobo Suarez [facility]   3/23/2022  2:15 PM Cliff Mcmanus, ALEN [facility]   3/30/2022 10:20 AM [facility] NURSE [facility] JAYMIE SCHED   4/20/2022  2:00 PM Naveen Amado   5/18/2022  2:40 PM LUDMILA Bueno [facility] JAYMIE SCHED   9/22/2022 10:00 AM Trish Coleman DO [facility] BS AMB

## 2022-03-23 ENCOUNTER — HOSPITAL ENCOUNTER (OUTPATIENT)
Dept: PHYSICAL THERAPY | Age: 72
Discharge: HOME | End: 2022-03-23
Payer: MEDICARE

## 2022-03-23 PROCEDURE — 97110 THERAPEUTIC EXERCISES: CPT

## 2022-03-23 PROCEDURE — 97530 THERAPEUTIC ACTIVITIES: CPT

## 2022-03-23 NOTE — PROGRESS NOTES
PT DAILY TREATMENT NOTE     Patient Name: Dmitriy Petersen  Date:3/23/2022  : 1950  [x]  Patient  Verified  Payor: Be Sullivan / Plan: [facility] MEDICARE COMPLETE / Product Type: Managed Care Medicare /    In time: 2:15  Out time: 3:00  Total Treatment Time (min): 45  Visit #: 2 of     Medicare/BCBS Only   Total Timed Codes (min):  45 1:1 Treatment Time:  45       Treatment Area: Other low back pain [M54.59]    SUBJECTIVE  Pain Level (0-10 scale): 610  Any medication changes, allergies to medications, adverse drug reactions, diagnosis change, or new procedure performed?: [x] No    [] Yes (see summary sheet for update)  Subjective functional status/changes:   [] No changes reported  Pt reports soreness in her right hip where the hematoma was and soreness in her stomach muscles from the other day session. She was hoping this clinic had a standing frame because that is what she used at inpatient rehab to get up and moving again. She needs to be able to do more for herself at home because her  is ill and unable to help like he used to. She would like to be able to roll in the bed to get off her back but notes weakness and inability to complete by herself. OBJECTIVE    10 min Therapeutic Exercise:  [x] See flow sheet :   Rationale: increase ROM and increase strength to improve the patients ability to perform ADLs safely. 35 min Therapeutic Activity:  [x] See flow sheet : w/c>left>plinth with supervision using sliding board    Supine to left side lying with Mod A to sitting    Sitting>right with sliding board to w/c transfer with Supervision Min A for board set up and table height adjustment to aide in ease    Education regarding compression stockings and elevation  Discussion of therapy plan for progression    Rationale: increase ROM and increase strength to improve the patients ability to perform ADLs safely.       With   [x] TE   [] TA   [] neuro   [] other: Patient Education: [x] Review HEP    [] Progressed/Changed HEP based on:  [] positioning   [] body mechanics   [x] transfers   [] heat/ice application    [] other:      Other Objective/Functional Measures:   Pt agreeable to work on rolling on large plinth in upcoming sessions  B genu valgus deformity  In supine left leg is shorter than right so will need to be monitored if standing progression becomes available to adjust for heel lift  Decreased B knee flexion mobility causing some difficulty to aide in rolling to side lying  Educated to work on elevation and getting swelling in left leg down to help with weight of leg and ease of movement  Educated to continue with gentle strengthening exercises at home and therapy could progress more functional strengthening for bed mobility and potential transfers depending on fear/confidence      Pain Level (0-10 scale) post treatment: 6/10    ASSESSMENT/Changes in Function: Pt with left>right LE swelling causing increased weight, decreased strength and decreased ease of transfers. She needs to work on rolling in bed for pressure relief and LBP comfort. She will need progression of LE strength and anterior weight shift with consistent team to build rapport and confidence to work towards ability to potentially try standing for transfers and benefits of weightbearing. Patient will continue to benefit from skilled PT services to modify and progress therapeutic interventions, address functional mobility deficits, address ROM deficits, address strength deficits, analyze and address soft tissue restrictions, analyze and cue movement patterns, analyze and modify body mechanics/ergonomics, assess and modify postural abnormalities and address imbalance/dizziness to attain remaining goals. []  See Plan of Care  [x]  See progress note/recertification  []  See Discharge Summary         Goals for this certification period to be accomplished in 4 weeks:  1.  Pt will be independent with updated HEP to maintain progression. Status at last Recert: new goal  Current: Progressing. (3/21/22)   2. Pt will improve FOTO score by 21 points to 37/100 to show improvement with functional mobility performance. Status at last Recert: 18/100  Current Status:               3. Pt will report at least 75% improvement with back pain to improve her QOL. Status at last Recert: 50%  Current Status:               4. Pt will be able to perform sit to stand with Min A to improve ease with ADLs/transfer. Status at last Recert: not met, 3 attempts with Dep A x 2, with fear of falling   Current Status:               5. Pt will be able to perform bed mobility including scooting to EOB with Mod I to improve ease with ADLs/transfer.   Status at last Recert: new goal  Current Status:      PLAN  [x]  Upgrade activities as tolerated     [x]  Continue plan of care  []  Update interventions per flow sheet       []  Discharge due to:_  []  Other:_    Keiry Wilkinson PTA 3/23/2022  9:48 AM    Future Appointments   Date Time Provider Stanley Benavidesi   3/23/2022  2:15 PM Cliff Mcmanus PTA [facility]   3/30/2022 10:20 AM [facility]   4/20/2022  2:00 PM [facility]   5/18/2022  2:40 PM LUDMILA Bueno [facility] JAYMIE LEVY   9/22/2022 10:00 AM Trish Coleman DO [facility] BS AMB

## 2022-03-28 ENCOUNTER — HOSPITAL ENCOUNTER (OUTPATIENT)
Dept: PHYSICAL THERAPY | Age: 72
Discharge: HOME | End: 2022-03-28
Payer: MEDICARE

## 2022-03-28 PROCEDURE — 97530 THERAPEUTIC ACTIVITIES: CPT

## 2022-03-28 PROCEDURE — 97110 THERAPEUTIC EXERCISES: CPT

## 2022-03-28 NOTE — PROGRESS NOTES
I have personally seen and evaluated the device findings. Interrogation reviewed and I agree with assessment.     Shane Terry

## 2022-03-28 NOTE — PROGRESS NOTES
PT DAILY TREATMENT NOTE     Patient Name: Terri Coreas  Date:3/28/2022  : 1950  [x]  Patient  Verified  Payor: Ignacio De Leon / Plan: [facility] MEDICARE COMPLETE / Product Type: Managed Care Medicare /    In time: 9:11  Out time: 9:50  Total Treatment Time (min): 39  Visit #: 3 of     Medicare/BCBS Only   Total Timed Codes (min):  39 1:1 Treatment Time:  39       Treatment Area: Other low back pain [M54.59]    SUBJECTIVE  Pain Level (0-10 scale): 0/10  Any medication changes, allergies to medications, adverse drug reactions, diagnosis change, or new procedure performed?: [x] No    [] Yes (see summary sheet for update)  Subjective functional status/changes:   [] No changes reported  Pt reports practicing rolling in bed at home     OBJECTIVE    25 min Therapeutic Exercise:  [x] See flow sheet :   Rationale: increase ROM and increase strength to improve the patients ability to perform ADLs with more ease    14 min Therapeutic Activity:  [x]  See flow sheet : reviewed rolling, ant weight shift with buttock lift to initiate sit to stand   Rationale: improve coordination, improve balance and increase proprioception  to improve the patients ability to perform ADLs with more ease      With   [] TE   [] TA   [] neuro   [] other: Patient Education: [x] Review HEP    [] Progressed/Changed HEP based on:   [] positioning   [] body mechanics   [] transfers   [] heat/ice application    [] other:      Other Objective/Functional Measures:    Cont to demonstrate elevated fear with sit to stand   Poor strength of HS & glute   Challenged with ant weight shift, replied on UEs for buttock lift than using LEs. Mod challenged with rolling     Pain Level (0-10 scale) post treatment: 0/10    ASSESSMENT/Changes in Function: Pt cont to present with poor strength and difficulty with ant weight shift. She feels like her Left leg has been weaker during the last several weeks, probably due to swelling.  Educated to discuss with MD for better management with swelling and extra imaging for back. Will cont with sit to stand training in parallel bars with UEs assistance as needed to build confidence. Patient will continue to benefit from skilled PT services to modify and progress therapeutic interventions, address functional mobility deficits, address ROM deficits, address strength deficits, analyze and address soft tissue restrictions, analyze and cue movement patterns, analyze and modify body mechanics/ergonomics, assess and modify postural abnormalities, address imbalance/dizziness and instruct in home and community integration to attain remaining goals. []  See Plan of Care  [x]  See progress note/recertification  []  See Discharge Summary         Progress towards goals / Updated goals:  Goals for this certification period to be accomplished in 4 weeks:  1. Pt will be independent with updated HEP to maintain progression. Status at last Recert: new goal  Current: Progressing. (3/21/22)   2. Pt will improve FOTO score by 21 points to 37/100 to show improvement with functional mobility performance. Status at last Recert: 18/100  Current Status:               3. Pt will report at least 75% improvement with back pain to improve her QOL. Status at last Recert: 50%  Current Status:               4. Pt will be able to perform sit to stand with Min A to improve ease with ADLs/transfer. Status at last Recert: not met, 3 attempts with Dep A x 2, with fear of falling   Current Status:               0. Pt will be able to perform bed mobility including scooting to EOB with Mod I to improve ease with ADLs/transfer.   Status at last Recert: new goal  Current Status:     PLAN  [x]  Upgrade activities as tolerated     [x]  Continue plan of care  []  Update interventions per flow sheet       []  Discharge due to:_  []  Other:_      Kaitlynn Bravo 3/28/2022  8:06 AM    Future Appointments   Date Time Provider Stanley Mayer 3/28/2022  9:00 AM Shima CALLAHAN [facility]   3/30/2022 10:20 AM [facility] NURSE [place] JAYMIE SCHED   4/1/2022 10:30 AM Paris Collet [facility]   4/6/2022  9:45 AM Tamanna Pizarro PTA [facility]   4/20/2022  2:00 PM Naveen Amado   5/18/2022  2:40 PM LUDMILA Valerio [facility] JAYMIE SCHED   9/22/2022 10:00 AM Veronica Schafer DO [facility] BS AMB

## 2022-04-01 ENCOUNTER — HOSPITAL ENCOUNTER (OUTPATIENT)
Dept: PHYSICAL THERAPY | Age: 72
End: 2022-04-01
Payer: MEDICARE

## 2022-04-06 ENCOUNTER — HOSPITAL ENCOUNTER (OUTPATIENT)
Dept: PHYSICAL THERAPY | Age: 72
Discharge: HOME | End: 2022-04-06
Payer: MEDICARE

## 2022-04-06 PROCEDURE — 97530 THERAPEUTIC ACTIVITIES: CPT

## 2022-04-06 PROCEDURE — 97140 MANUAL THERAPY 1/> REGIONS: CPT

## 2022-04-06 NOTE — PROGRESS NOTES
PT DAILY TREATMENT NOTE     Patient Name: Daniel Santos  Date:2022  : 1950  [x]  Patient  Verified  Payor: Sue Hunt / Plan: [facility] MEDICARE COMPLETE / Product Type: Managed Care Medicare /    In time: 9:46  Out time: 10:30  Total Treatment Time (min): 44  Visit #: 4 of     Medicare/BCBS Only   Total Timed Codes (min):  44 1:1 Treatment Time:  44       Treatment Area: Other low back pain [M54.59]    SUBJECTIVE  Pain Level (0-10 scale): 9-10/10  Any medication changes, allergies to medications, adverse drug reactions, diagnosis change, or new procedure performed?: [x] No    [] Yes (see summary sheet for update)  Subjective functional status/changes:   [] No changes reported  Pt reports increased right buttocks pain that is burning and almost caused her to go to the emergency room. She states the flexeril and cream rub is only helping temporarily. She has a doctor appt on Friday and will see what he has to say before return to therapy.     OBJECTIVE      29 min Therapeutic Activity:  [x]  See flow sheet : pt education regarding use of ice and how to make \"do it yourself\" ice pack for right SI and right shoulder; education on side sleeping posture with pillow between legs; education on gentle TPR and tennis ball rolling for right piriformis and glute to reduce tension    Provided biofreeze to try    Review of even weight shift in sitting to reduce sacral torsion and upslip   Rationale: improve coordination, improve balance and increase proprioception  to improve the patients ability to perform ADLs with more ease    15 min Manual Therapy:  [x]  See flow sheet : leg lengthening for left upslip; MET for left PI; shotgun technique; gentle MET and then MWM to correct a left/left sacral torsion   Rationale: improve coordination, improve balance and increase proprioception  to improve the patients ability to perform ADLs with more ease      With   [] TE   [] TA   [] neuro   [] other: Patient Education: [x] Review HEP    [] Progressed/Changed HEP based on:   [] positioning   [] body mechanics   [] transfers   [] heat/ice application    [] other:      Other Objective/Functional Measures:   Pt education as listed above in therapeutic activity  Pt tends to sit with increased right weight shift  Max A with rolling to left side lying and with bed mobility  Min A to assist with transfer board to w/c but usually pt is SBA just assisted due to increased pain  TTP right SI and along right piriformis muscle  Unable to fully correct left sacral torsion due to weakness and difficulty with positioning for corrections     Pain Level (0-10 scale) post treatment: 0/10 just a burn but pain is less    ASSESSMENT/Changes in Function: Pt with increased right SI pain likely due to prolonged sitting with increased right weight shift along with continued B LE weakness and swelling causing increased LE heaviness. She will benefit from cocontraction exercises and isometrics to improve hip and core strength to help with pelvic stability in order to reduce SIJD symptoms and pain in order to progress ability to work on standing or further LE strengthening. Patient will continue to benefit from skilled PT services to modify and progress therapeutic interventions, address functional mobility deficits, address ROM deficits, address strength deficits, analyze and address soft tissue restrictions, analyze and cue movement patterns, analyze and modify body mechanics/ergonomics, assess and modify postural abnormalities, address imbalance/dizziness and instruct in home and community integration to attain remaining goals. []  See Plan of Care  [x]  See progress note/recertification  []  See Discharge Summary         Progress towards goals / Updated goals:  Goals for this certification period to be accomplished in 4 weeks:  1. Pt will be independent with updated HEP to maintain progression.   Status at last Recert: new goal  Current: Progressing. (3/21/22)   2. Pt will improve FOTO score by 21 points to 37/100 to show improvement with functional mobility performance. Status at last Recert: 18/100  Current Status:               3. Pt will report at least 75% improvement with back pain to improve her QOL. Status at last Recert: 50%  Current Status:               4. Pt will be able to perform sit to stand with Min A to improve ease with ADLs/transfer. Status at last Recert: not met, 3 attempts with Dep A x 2, with fear of falling   Current Status:               0. Pt will be able to perform bed mobility including scooting to EOB with Mod I to improve ease with ADLs/transfer.   Status at last Recert: new goal  Current Status:     PLAN  [x]  Upgrade activities as tolerated     [x]  Continue plan of care  []  Update interventions per flow sheet       []  Discharge due to:_  []  Other:_      Stefany Jamison PTA 4/6/2022  8:06 AM    Future Appointments   Date Time Provider Stanley Mayer   4/6/2022  9:45 AM Niecy Vidales PTA [facility]   4/20/2022  2:00 PM [facility]   5/18/2022  2:40 PM LUDMILA Burdick [facility] JAYMIE SCHED   9/22/2022 10:00 AM Chris Cote DO [facility] BS AMB

## 2022-05-02 RX ORDER — SACUBITRIL AND VALSARTAN 97; 103 MG/1; MG/1
1 TABLET, FILM COATED ORAL 2 TIMES DAILY
Qty: 180 TABLET | Refills: 3 | Status: SHIPPED | OUTPATIENT
Start: 2022-05-02 | End: 2022-08-29 | Stop reason: SDUPTHER

## 2022-05-02 RX ORDER — FUROSEMIDE 40 MG/1
TABLET ORAL
Qty: 135 TABLET | Refills: 3 | Status: SHIPPED | OUTPATIENT
Start: 2022-05-02

## 2022-05-02 RX ORDER — CARVEDILOL 6.25 MG/1
6.25 TABLET ORAL 2 TIMES DAILY
Qty: 180 TABLET | Refills: 3 | Status: SHIPPED | OUTPATIENT
Start: 2022-05-02

## 2022-05-13 NOTE — PROGRESS NOTES
Problem: Mobility Impaired (Adult and Pediatric)  Goal: *Acute Goals and Plan of Care (Insert Text)  STGs to be addressed within 3 days:  1. Bed mobility: Supine to sit to supine MaxAx1 with HR for meals. 2. Activity tolerance: Tolerate EOB > 15 minutes for ADLs. 3. Transfers: SPT-->to chair max/mod A with LRAD for ADLs. 4. Pt demo fair seated balance in preparation for functional transfers. LTGs to be addressed within 7 days:  1. Transfers: SB-->to chair/wc max/mod A for ADLs. 2. Activity tolerance: Tolerated > 1 hr in chair for change of position. 3. Patient Education: Independent with HEP for home safety. Outcome: Progressing Towards Goal  PHYSICAL THERAPY TREATMENT     Patient: Pamela Hayes (62 y.o. female)  Date: 5/19/2017  Diagnosis: Acute paraplegia  Acute paraplegia ([facility] [address] Acute paraplegia [facility])       Precautions: Fall, Skin  Chart, physical therapy assessment, plan of care and goals were reviewed. ASSESSMENT:  Pt is seen for skilled PT session to address her current deficits in strength, mobility, transfers, safety and balance. Pt was encouraged to sit EOB with therapist to address her seated balance deficits. She agreed and was able with maxA to get EOB and sit for 3 minutes support with HOB and holding on to bedrails. Pt was fatigued and began increasing her respirations after 3 minutes of seated activity and although shewas encouraged to sit longer and use relaxation breathing to improve respirations, she was unable. She was returned to bed with mas X for scooting and moving up to the [facility]. Pt would benefit from continued PT to improve her endurance and strength. Progression toward goals:  [ ]      Improving appropriately and progressing toward goals  [X]      Improving slowly and progressing toward goals  [ ]      Not making progress toward goals and plan of care will be adjusted       PLAN:  Patient continues to benefit from skilled intervention to address the above impairments. Continue treatment per established plan of care. Discharge Recommendations: To Be Determined  Further Equipment Recommendations for Discharge:  N/A       G-CODES:      Mobility  Current  CM= 80-99%. The severity rating is based on the Level of Assistance required for Functional Mobility and ADLs. SUBJECTIVE:   Patient stated I remember you from the rehab center.       OBJECTIVE DATA SUMMARY:   Critical Behavior:  Neurologic State: Alert, Eyes open spontaneously  Orientation Level: Oriented to person, Oriented to place, Oriented to time  Cognition: Appropriate for age attention/concentration  Safety/Judgement: Decreased insight into deficits, Decreased awareness of need for assistance  Functional Mobility Training:  Bed Mobility:  Rolling: Maximum assistance  Supine to Sit: Maximum assistance  Sit to Supine: Maximum assistance  Scooting: Total assistance     Balance:  Sitting: Impaired  Sitting - Static: Poor (constant support)  Sitting - Dynamic: Poor (constant support)     Neuro Re-Education:  Pt is challenged to sit EOB x 3 minutes with support to enhance her sitting balance and endurance. Therapeutic Exercises:   LLE-Passive ROM to hip and knee with increased c/o pain and discomfort. RLE-AAROM for QS,GS,heel slides and abduction. Pain:  Pt reports 10/10 pain or discomfort prior to treatment. (BLEs and abdomen)  Pt reports 11/10 pain or discomfort post treatment. Activity Tolerance:   PT WITH IMPROVED TOLERANCE TO SKILLED  Session today. Pt is encouraged to participate in skilled sessions daily to increase and enhance her rehab potential with mobility and eventually to improve functional independence. Please refer to the flowsheet for vital signs taken during this treatment.   After treatment:   [ ] Patient left in no apparent distress sitting up in chair  [X] Patient left in no apparent distress in bed  [X] Call bell left within reach  [ ] Nursing notified  [X] Caregiver present  [ ] Bed alarm activated      Jo Ann Ohms   Time Calculation: 30 mins negative - No discharge, No redness

## 2022-06-15 ENCOUNTER — HOSPITAL ENCOUNTER (OUTPATIENT)
Dept: CT IMAGING | Age: 72
Discharge: HOME | End: 2022-06-15
Attending: PHYSICIAN ASSISTANT
Payer: MEDICARE

## 2022-06-15 DIAGNOSIS — N39.0 RECURRENT UTI: ICD-10-CM

## 2022-06-15 DIAGNOSIS — N20.0 KIDNEY STONES: ICD-10-CM

## 2022-06-15 PROCEDURE — 74176 CT ABD & PELVIS W/O CONTRAST: CPT

## 2022-06-22 NOTE — PROGRESS NOTES
Reviewed CT with Dr. Alex Guthrie, no stones that we are seeing that would be causing UTIs but he would like to get patient scheduled for cysto to see if there is something else in the bladder not picked up CT scan. Can we get patient scheduled for next available cysto with Dr. Alex Guthrie?

## 2022-07-13 NOTE — PROGRESS NOTES
[facility] - Manny Valenzuela [address]  [phone] fax    Physical Therapy Discharge Summary    Patient name: Cristiana Knutson Start of Care: 2022   Referral source: Ettie Hodgkins, MD : 1950   Medical/Treatment Diagnosis: Other low back pain [M54.59]  Payor: Sobeida Mendoza / Plan: Kelly Pillar / Product Type: [employer] /  Onset Date: aggravated about several weeks ago      Prior Hospitalization: see medical history Provider#: [id number]   Medications: Verified on Patient Summary List     Comorbidities: heart disease, arthritis, diabetes, pacemaker, HTN, latex allergy, strok, gall bladder surgery, Right heel surgery  Prior Level of Function: mod ind with ADLs, using WC since 2018    Visits from Start of Care: 11    Missed Visits: 1    Reporting Period : 3-17-22 to 22    Summary of Care:  1. Pt will be independent with updated HEP to maintain progression. Status at last Recert: new goal  Current: Progressing. (3/21/22)   Status at discharge: not met, unplanned discharge    2. Pt will improve FOTO score by 21 points to 37/100 to show improvement with functional mobility performance. Status at last Recert: 69/812  Status at discharge: not met, unplanned discharge    3. Pt will report at least 75% improvement with back pain to improve her QOL. Status at last Recert: 74%  Status at discharge: not met, unplanned discharge                4. Pt will be able to perform sit to stand with Min A to improve ease with ADLs/transfer. Status at last Recert: not met, 3 attempts with Dep A x 2, with fear of falling   Status at discharge: not met, unplanned discharge              5. Pt will be able to perform bed mobility including scooting to EOB with Mod I to improve ease with ADLs/transfer.   Status at last Recert: new goal  Status at discharge: not met, unplanned discharge      ASSESSMENT/RECOMMENDATIONS: Pt cont to present with pelvic obliquity. Pain and alignment improves after manual. Poor carry over of pain relief due to decreased core strength and flexibility of B Les. She didn't schedule to cont PT as planned.      [x]Discontinue therapy: []Patient has reached or is progressing toward set goals      [x]Patient is non-compliant or has abdicated      [x]Due to lack of appreciable progress towards set goals    Bayron Orosco 7/13/2022 10:48 AM

## 2022-08-29 RX ORDER — SACUBITRIL AND VALSARTAN 97; 103 MG/1; MG/1
1 TABLET, FILM COATED ORAL 2 TIMES DAILY
Qty: 180 TABLET | Refills: 3 | Status: SHIPPED | OUTPATIENT
Start: 2022-08-29

## 2022-09-22 ENCOUNTER — CLINICAL SUPPORT (OUTPATIENT)
Dept: CARDIOLOGY CLINIC | Age: 72
End: 2022-09-22

## 2022-09-22 ENCOUNTER — OFFICE VISIT (OUTPATIENT)
Dept: CARDIOLOGY CLINIC | Age: 72
End: 2022-09-22
Payer: MEDICARE

## 2022-09-22 VITALS
SYSTOLIC BLOOD PRESSURE: 120 MMHG | HEART RATE: 90 BPM | DIASTOLIC BLOOD PRESSURE: 72 MMHG | OXYGEN SATURATION: 97 % | HEIGHT: 61 IN | BODY MASS INDEX: 43.84 KG/M2

## 2022-09-22 DIAGNOSIS — I50.22 CHRONIC SYSTOLIC HEART FAILURE (HCC): ICD-10-CM

## 2022-09-22 DIAGNOSIS — I44.7 LEFT BUNDLE BRANCH BLOCK (LBBB) ON ELECTROCARDIOGRAM: ICD-10-CM

## 2022-09-22 DIAGNOSIS — I42.8 NONISCHEMIC CARDIOMYOPATHY (HCC): Primary | ICD-10-CM

## 2022-09-22 PROCEDURE — 1123F ACP DISCUSS/DSCN MKR DOCD: CPT | Performed by: INTERNAL MEDICINE

## 2022-09-22 PROCEDURE — G8417 CALC BMI ABV UP PARAM F/U: HCPCS | Performed by: INTERNAL MEDICINE

## 2022-09-22 PROCEDURE — G8510 SCR DEP NEG, NO PLAN REQD: HCPCS | Performed by: INTERNAL MEDICINE

## 2022-09-22 PROCEDURE — 1101F PT FALLS ASSESS-DOCD LE1/YR: CPT | Performed by: INTERNAL MEDICINE

## 2022-09-22 PROCEDURE — 1090F PRES/ABSN URINE INCON ASSESS: CPT | Performed by: INTERNAL MEDICINE

## 2022-09-22 PROCEDURE — G8427 DOCREV CUR MEDS BY ELIG CLIN: HCPCS | Performed by: INTERNAL MEDICINE

## 2022-09-22 PROCEDURE — 99214 OFFICE O/P EST MOD 30 MIN: CPT | Performed by: INTERNAL MEDICINE

## 2022-09-22 PROCEDURE — 3017F COLORECTAL CA SCREEN DOC REV: CPT | Performed by: INTERNAL MEDICINE

## 2022-09-22 PROCEDURE — G8536 NO DOC ELDER MAL SCRN: HCPCS | Performed by: INTERNAL MEDICINE

## 2022-09-22 PROCEDURE — G8400 PT W/DXA NO RESULTS DOC: HCPCS | Performed by: INTERNAL MEDICINE

## 2022-09-22 NOTE — PROGRESS NOTES
Roger Godinez presents today for   Chief Complaint   Patient presents with    Follow-up     6 month follow up          Roger Godinez preferred language for health care discussion is english/other. Is someone accompanying this pt? no    Is the patient using any DME equipment during [address]? wheelchair    Depression Screening:  3 most recent PHQ Screens 9/22/2022   Little interest or pleasure in doing things Not at all   Feeling down, depressed, irritable, or hopeless Not at all   Total Score PHQ 2 0       Learning Assessment:  Learning Assessment 3/15/2021   PRIMARY LEARNER Patient   HIGHEST LEVEL OF EDUCATION - PRIMARY LEARNER  -   BARRIERS PRIMARY LEARNER -   CO-LEARNER CAREGIVER -   PRIMARY LANGUAGE ENGLISH   LEARNER PREFERENCE PRIMARY DEMONSTRATION   ANSWERED BY patient   RELATIONSHIP SELF       Abuse Screening:  Abuse Screening Questionnaire 9/22/2022   Do you ever feel afraid of your partner? N   Are you in a relationship with someone who physically or mentally threatens you? N   Is it safe for you to go home? Y       Fall Risk  Fall Risk Assessment, last 12 mths 9/22/2022   Able to walk? Yes   Fall in past 12 months? 0   Do you feel unsteady? 0   Are you worried about falling 0       Pt currently taking Anticoagulant therapy? no    Coordination of Care:  1. Have you been to the ER, urgent care clinic since your last visit? Hospitalized since your last visit? no    2. Have you seen or consulted any other health care providers outside of the [address] [place] [address] since your last visit? Include any pap smears or colon screening.  no

## 2022-09-22 NOTE — PROGRESS NOTES
Jorge Nbole    f/u HFmrEF, ICD, preop    HPI    Jorge Noble is a 67 y.o. -American female with history of a dilated cardiomyopathy with mild left ventricular dysfunction and an ejection fraction in the 40 to 45% range with widely patent coronary arteries and a cardiac catheterization back in August 1996. An echocardiogram completed on May 5, 2017 suggested an ejection fraction of 50%. She did have a biventricular AICD placed but due to trauma and infection in that area she had that removed and now she has a biventricular pacemaker with pulse generator residing in her left upper quadrant placed back in October 2012. Unfortunately, she fell at home and traumatized her back developing an infection of her right Psoas muscle hematoma with development of epidural abscess with neurologic compromise resulting in paraplegia back in April 2017. She had her pacemaker generator changed by Dr. Regan Knight back on February 26, 2019. It should be noted that she had an infected right foot and was on antibiotics for for some time before the generator change and she is now off antibiotics and foot is now healing by secondary intention and apparently is doing well according to the patient. She has had some problems with chronic heart failure for which we have tried to adjust her diuretics. Her last echocardiogram was completed on August 17, 2020 demonstrated a left ventricular ejection fraction of 25 to 30% which was slightly improved from past studies and suggested some benefit from her ongoing Entresto therapy. She does have stage III chronic kidney disease which makes more aggressive diuresis a little challenging.        Past Medical History:   Diagnosis Date    Acute paraplegia ([address] 4/20/2017    Benign hypertensive heart disease with systolic CHF, NYHA class 2 ([address] 9/5/2012    Biventricular implantable cardioverter-defibrillator in situ 04/28/2005    Upgraded to BiV AICD; gen change 4/2008; pocket revision 10/2009; Abdominal - done on 8/22/2012 by Dr. Trace Wasserman     Cardiac cath 08/15/1996    Patent coronaries. Elev LVEDP. EF 50-55%. Cardiac echocardiogram 06/23/2015    Ltd study. EF 45-50%. Mild, diffuse hypk. Severe apical hypk. No mass or thrombus was clearly identified, although imaging was suboptimal.      Cardiac nuclear imaging test 06/19/2015    Fixed distal apical, distal septal defect more likely due to RV pacing than prior infarct. No ischemia. EF 46%. RWMA c/w RV pacing. Nondiagnostic EKG on pharm stress test.      Cardiovascular lower extremity venous duplex 09/04/2012    Acute, non-occlusive DVT in CFV on right. No DVT on left. No superficial thrombosis bilaterally. Cardiovascular upper extremity venous duplex 08/27/2012    DVT in axillary vein on left. Left subclavian was not visualized.     Chronic anemia 9/5/2012    Chronic systolic heart failure (HCC)     Decreased calculated glomerular filtration rate (GFR) 3/30/2017    Calculated GFR equivalent to that of CKD stage 3 = 30-59 ml/min    Diabetic neuropathy associated with type 2 diabetes mellitus ([address] 6/28/2011    Difficult airway for intubation 08/22/2012    see anesthesia airway note    Dyslipidemia 6/28/2011    Gout     History of complete heart block 06/28/2011    History of Coumadin therapy     Anticoagulation for DVT of the LUE; Discontinued on 3/30/2017    History of deep venous thrombosis 9/5/2012    Left upper extremity    History of pyelonephritis 3/30/2017    Left bundle branch block (LBBB) on electrocardiogram 06/28/2011    Nonischemic cardiomyopathy ([address] 6/28/2011    Obesity (BMI 35.0-39.9 without comorbidity) 3/13/2017    Obstructive sleep apnea on CPAP 2/7/2012    Psoas abscess, right ([address] 4/20/2017    Psoas hematoma, right, secondary to anticoagulant therapy 3/30/2017    Type 2 diabetes mellitus with diabetic neuropathy ([address] 6/28/2011       Past Surgical History:   Procedure Laterality Date    COLONOSCOPY N/A 10/5/2021    COLONOSCOPY performed by Eileen Lu MD at [address] [place] [address]  4/07    right     HX CHOLECYSTECTOMY  1994    HX HYSTERECTOMY  1973    HX OTHER SURGICAL  6/11/2012    AICD revision    HX PACEMAKER  4/28/2005    Med[employer]ic AICD    CO REMVL PERM PM PLS GEN W/REPL PLSE GEN 2 LEAD SYS N/A 2/26/2019    REMOVE & REPLACE PPM GEN DUAL LEAD performed by Teresa Goodrich MD at [facility]       Current Outpatient Medications   Medication Sig Dispense Refill    sacubitriL-valsartan (Entresto)  mg tablet Take 1 Tablet by mouth two (2) times a day. 180 Tablet 3    furosemide (LASIX) 40 mg tablet TAKE 1 AND 1/2 TABLETS BY MOUTH DAILY 135 Tablet 3    carvediloL (COREG) 6.25 mg tablet Take 1 Tablet by mouth two (2) times a day. 180 Tablet 3    doxycycline (ADOXA) 100 mg tablet Take 1 Tablet by mouth two (2) times a day. 14 Tablet 0    metOLazone (ZAROXOLYN) 2.5 mg tablet TAKE 1 TABLET BY MOUTH TWICE WEEKLY 8 Tablet 3    fosfomycin (MONUROL) 3 gram pack oral packet Take 1 Packet by mouth every three (3) days. 3 Packet 0    diazePAM (Valium) 10 mg tablet Take 1 Tablet by mouth every six (6) hours as needed for Anxiety. Max Daily Amount: 40 mg. 12 Tablet 1    hydrOXYzine HCL (ATARAX) 25 mg tablet TAKE 1 TABLET BY MOUTH EVERY DAY      VITAMIN D3-VITAMIN K2, MK4, PO Take 1 Tab by mouth daily. cyanocobalamin/folic acid (vitamin Z10-TNHCY acid) 1,754-162 mcg lozg Take 2 Tabs by mouth daily. ZINC GLUCONATE PO Take 25 mg by mouth daily. magnesium hydroxide (Camara Milk of Magnesia) 400 mg/5 mL suspension Take 5 mL by mouth as needed for Constipation. insulin glargine (LANTUS U-100 INSULIN) 100 unit/mL injection 10 Units by SubCUTAneous route nightly. Indications: type 2 diabetes mellitus 10 Vial 0    exenatide microspheres (BYDUREON) 2 mg serr 2 mg by SubCUTAneous route every seven (7) days. multivitamin (ONE A DAY) tablet Take 1 Tab by mouth daily. metFORMIN ER (GLUCOPHAGE XR) 500 mg tablet Take 500 mg by mouth two (2) times daily (with meals). cholecalciferol, VITAMIN D3, (VITAMIN D3) 5,000 unit tab tablet Take 5,000 Units by mouth daily. take 1 tab daily of vitamin D3, 5000 units      gabapentin (NEURONTIN) 300 mg capsule Take 2 Caps by mouth two (2) times a day. Indications: NEUROPATHIC PAIN 100 Cap 0       Allergies   Allergen Reactions    Latex Itching    Vancomycin Itching    Ampicillin Itching    Bactrim [Sulfamethoxazole-Trimethoprim] Itching    Blueberry Swelling     Causes throat swelling    Ciprofloxacin Itching    Codeine Other (comments)     Jumpy feeling    Crestor [Rosuvastatin] Itching    Darvocet A500 [Propoxyphene N-Acetaminophen] Itching    Demerol [Meperidine] Itching    Levaquin [Levofloxacin] Itching    Lipitor [Atorvastatin] Myalgia    Magnesium Oxide Itching     nausea    Minocin [Minocycline] Itching    Pcn [Penicillins] Itching    Pravachol [Pravastatin] Swelling     Swelling in mouth.    Not allergic per patient, takes at home    Shellfish Derived Swelling     itching    Sulfa (Sulfonamide Antibiotics) Itching    Ultracet [Tramadol-Acetaminophen] Itching    Vicodin [Hydrocodone-Acetaminophen] Itching    Vytorin 10-10 [Ezetimibe-Simvastatin] Myalgia    Percodan [Oxycodone Hcl-Oxycodone-Asa] Itching       Social History     Socioeconomic History    Marital status:      Spouse name: Not on file    Number of children: Not on file    Years of education: Not on file    Highest education level: Not on file   Occupational History    Not on file   Tobacco Use    Smoking status: Never    Smokeless tobacco: Never   Vaping Use    Vaping Use: Never used   Substance and Sexual Activity    Alcohol use: Never    Drug use: Never    Sexual activity: Not Currently     Partners: Male   Other Topics Concern    Not on file   Social History Narrative    Not on file     Social Determinants of Health     Financial Resource Strain: Not on file Food Insecurity: Not on file   Transportation Needs: Not on file   Physical Activity: Not on file   Stress: Not on file   Social Connections: Not on file   Intimate Partner Violence: Not on file   Housing Stability: Not on file        FH: n/a    Review of Systems    14 pt Review of Systems is negative unless otherwise mentioned in the HPI. Wt Readings from Last 3 Encounters:   07/01/22 105.2 kg (232 lb)   05/18/22 107 kg (236 lb)   11/05/21 107 kg (236 lb)     Temp Readings from Last 3 Encounters:   01/28/22 97.5 °F (36.4 °C)   01/07/22 97.3 °F (36.3 °C)   11/05/21 98.5 °F (36.9 °C)     BP Readings from Last 3 Encounters:   09/22/22 120/72   03/18/22 100/62   10/05/21 134/77     Pulse Readings from Last 3 Encounters:   09/22/22 90   03/18/22 94   10/05/21 82       08/17/20   ECHO ADULT COMPLETE 08/17/2020 8/17/2020    Narrative · Contrast used: DEFINITY. · Echo study was technically difficulty. · LV: Estimated LVEF is 25 - 30%. Visually measured ejection fraction. Normal cavity size. Mildly increased wall thickness. Severely reduced   systolic function. Mild (grade 1) left ventricular diastolic dysfunction. · IVC: Moderately elevated central venous pressure (10-15 mmHg); IVC   diameter is larger than 21 mm and collapses more than 50% with   respiration. · PA: Pulmonary arterial systolic pressure is 34 mmHg. Pulmonary   hypertension not suggested by Doppler findings. · LA: Left Atrium volume index is 28 mL/m2. · RV: Dilated right ventricle. Reduced systolic function. · RA: Dilated right atrium. Signed by: Mandy Obregon MD       Physical Exam:    Visit Vitals  /72 (BP 1 Location: Left upper arm, BP Patient Position: Sitting, BP Cuff Size: Adult)   Pulse 90   Ht 5' 1\" (1.549 m)   LMP  (LMP Unknown)   SpO2 97%   BMI 43.84 kg/m²      Physical Exam  HENT:      Head: Normocephalic and atraumatic. Eyes:      Pupils: Pupils are equal, round, and reactive to light.    Cardiovascular:      Rate and Rhythm: Normal rate and regular rhythm. Heart sounds: Normal heart sounds. No murmur heard. No friction rub. No gallop. Comments: No JVD, JVP just above clavicle with pt at 90 degrees  Pulmonary:      Effort: Pulmonary effort is normal. No respiratory distress. Breath sounds: Normal breath sounds. No wheezing or rales. Chest:      Chest wall: No tenderness. Abdominal:      General: Bowel sounds are normal.      Palpations: Abdomen is soft. Musculoskeletal:         General: No tenderness. Right lower le+ Pitting Edema present. Left lower le+ Pitting Edema present. Skin:     General: Skin is warm and dry. Neurological:      Mental Status: She is alert and oriented to person, place, and time. EKG today shows: NSR, normal axis and intervals, no ST segment abnormalities    Lab Results   Component Value Date/Time    Sodium 141 2021 02:50 PM    Potassium 3.9 2021 02:50 PM    Chloride 108 2021 02:50 PM    CO2 28 2021 02:50 PM    Anion gap 5 2021 02:50 PM    Glucose 96 2021 02:50 PM    BUN 30 (H) 2021 02:50 PM    Creatinine 1.92 (H) 2021 02:50 PM    BUN/Creatinine ratio 16 2021 02:50 PM    GFR est AA 31 (L) 2021 02:50 PM    GFR est non-AA 26 (L) 2021 02:50 PM    Calcium 9.0 2021 02:50 PM    Bilirubin, total 0.7 2021 02:50 PM    Alk.  phosphatase 94 2021 02:50 PM    Protein, total 7.4 2021 02:50 PM    Albumin 3.6 2021 02:50 PM    Globulin 3.8 2021 02:50 PM    A-G Ratio 0.9 2021 02:50 PM    ALT (SGPT) 32 2021 02:50 PM    AST (SGOT) 26 2021 02:50 PM     Lab Results   Component Value Date/Time    WBC 6.9 2021 02:50 PM    Hemoglobin, POC 7.8 (L) 2012 09:30 AM    HGB 11.9 (L) 2021 02:50 PM    Hematocrit, POC 23 (L) 2012 09:30 AM    HCT 34.9 (L) 2021 02:50 PM    PLATELET 139  02:50 PM    MCV 81.4 2021 02:50 PM Lab Results   Component Value Date/Time    Cholesterol, total 127 08/17/2018 02:40 AM    HDL Cholesterol 42 08/17/2018 02:40 AM    LDL, calculated 61.6 08/17/2018 02:40 AM    VLDL, calculated 23.4 08/17/2018 02:40 AM    Triglyceride 117 08/17/2018 02:40 AM    CHOL/HDL Ratio 3.0 08/17/2018 02:40 AM     Lab Results   Component Value Date/Time    TSH 0.29 (L) 12/14/2019 12:55 PM     Lab Results   Component Value Date/Time    Hemoglobin A1c 5.8 (H) 12/14/2019 01:44 AM         Impression and Plan:  Mariela Baker is a 67 y.o. with:    1.) AECHF/ HFmrEF 40-45%, with +LE edema (but JVD normal)  2.) ICD, with h/o infection, device checked today  3.) DM2, well controlled  4.) HTN, at goal  5.) CKD 3, SCr baseline ~1.4 ([facility])  6.) Abn TSH?  7.) H/o recurrent infections  8.) Poor functional status/ Paraplegia    1.) Ok to continue Lasix 60 mg daily, but on \"bad days\" takes 80 mg  2.) Not currently on Metalazone, has follow up labs and appt with Demetria Turner in a few weeks  3.) Has historically not needed K replacement, but will need to keep an eye  4.) RTC 6 months with device check    35 mins spent    Thank you for allowing me to participate in the care of your patient, please do not hesitate to call with questions or concerns.       [address],    Paige Dupont, DO

## 2023-01-06 RX ORDER — CARVEDILOL 6.25 MG/1
TABLET ORAL
Qty: 180 TABLET | Refills: 3 | Status: SHIPPED | OUTPATIENT
Start: 2023-01-06

## 2023-02-03 RX ORDER — METOLAZONE 2.5 MG/1
TABLET ORAL
Qty: 8 TABLET | Refills: 3 | Status: SHIPPED | OUTPATIENT
Start: 2023-02-03

## 2023-04-25 RX ORDER — METOLAZONE 2.5 MG/1
TABLET ORAL
Qty: 24 TABLET | Refills: 3 | Status: SHIPPED | OUTPATIENT
Start: 2023-04-25

## 2023-04-26 ENCOUNTER — OFFICE VISIT (OUTPATIENT)
Age: 73
End: 2023-04-26
Payer: MEDICARE

## 2023-04-26 ENCOUNTER — TELEPHONE (OUTPATIENT)
Age: 73
End: 2023-04-26

## 2023-04-26 ENCOUNTER — NURSE ONLY (OUTPATIENT)
Age: 73
End: 2023-04-26

## 2023-04-26 VITALS
BODY MASS INDEX: 36.42 KG/M2 | HEART RATE: 84 BPM | SYSTOLIC BLOOD PRESSURE: 124 MMHG | HEIGHT: 68 IN | DIASTOLIC BLOOD PRESSURE: 82 MMHG | OXYGEN SATURATION: 98 %

## 2023-04-26 DIAGNOSIS — I42.8 NONISCHEMIC CARDIOMYOPATHY (HCC): Primary | ICD-10-CM

## 2023-04-26 DIAGNOSIS — I50.22 CHRONIC SYSTOLIC (CONGESTIVE) HEART FAILURE (HCC): Primary | ICD-10-CM

## 2023-04-26 DIAGNOSIS — I44.7 LEFT BUNDLE-BRANCH BLOCK, UNSPECIFIED: ICD-10-CM

## 2023-04-26 DIAGNOSIS — Z95.0 BIVENTRICULAR CARDIAC PACEMAKER IN SITU: ICD-10-CM

## 2023-04-26 DIAGNOSIS — I50.22 CHRONIC SYSTOLIC (CONGESTIVE) HEART FAILURE (HCC): ICD-10-CM

## 2023-04-26 PROCEDURE — 1123F ACP DISCUSS/DSCN MKR DOCD: CPT | Performed by: INTERNAL MEDICINE

## 2023-04-26 PROCEDURE — 99214 OFFICE O/P EST MOD 30 MIN: CPT | Performed by: INTERNAL MEDICINE

## 2023-04-26 PROCEDURE — 93000 ELECTROCARDIOGRAM COMPLETE: CPT | Performed by: INTERNAL MEDICINE

## 2023-04-26 RX ORDER — EXENATIDE 2 MG/.85ML
INJECTION, SUSPENSION, EXTENDED RELEASE SUBCUTANEOUS
COMMUNITY
Start: 2023-04-18

## 2023-04-26 RX ORDER — BUMETANIDE 2 MG/1
1 TABLET ORAL DAILY
Qty: 90 TABLET | Refills: 3 | Status: SHIPPED | OUTPATIENT
Start: 2023-04-26

## 2023-04-26 RX ORDER — METHENAMINE HIPPURATE 1000 MG/1
1 TABLET ORAL 2 TIMES DAILY WITH MEALS
COMMUNITY

## 2023-04-26 RX ORDER — INSULIN GLARGINE 100 [IU]/ML
10 INJECTION, SOLUTION SUBCUTANEOUS NIGHTLY
COMMUNITY

## 2023-04-26 ASSESSMENT — PATIENT HEALTH QUESTIONNAIRE - PHQ9
2. FEELING DOWN, DEPRESSED OR HOPELESS: 0
SUM OF ALL RESPONSES TO PHQ QUESTIONS 1-9: 0
SUM OF ALL RESPONSES TO PHQ QUESTIONS 1-9: 0
1. LITTLE INTEREST OR PLEASURE IN DOING THINGS: 0
SUM OF ALL RESPONSES TO PHQ QUESTIONS 1-9: 0
SUM OF ALL RESPONSES TO PHQ9 QUESTIONS 1 & 2: 0
SUM OF ALL RESPONSES TO PHQ QUESTIONS 1-9: 0

## 2023-04-26 NOTE — TELEPHONE ENCOUNTER
Verbal order and read back per Sharla Cain,   Stop taking Lasix  Start Bumex 1 mg once daily  BMP in 4 weeks     Called to update pt, left vm, waiting for call back.

## 2023-04-26 NOTE — PROGRESS NOTES
Jonel García    Chief Complaint   Patient presents with    Follow-up     7 month f/u     Device Check     Medtronic device     Shortness of Breath     SOB constant laying and sittng     Edema     Left ankle/feet swelling constant        HPI    Jonel García is a 67 y.o. -American female with history of a dilated cardiomyopathy with mild left ventricular dysfunction and an ejection fraction in the 40 to 45% range with widely patent coronary arteries and a cardiac catheterization back in August 1996 who followed with Dr. Kirk Hardwick for years. An echocardiogram completed on May 5, 2017 suggested an ejection fraction of 50%. She did have a biventricular AICD placed but due to trauma and infection in that area she had that removed and now she has a biventricular pacemaker with pulse generator residing in her left upper quadrant placed back in October 2012. 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 She had her pacemaker generator changed by Dr. Qamar Caruso back on February 26, 2019. It should be noted that she had an infected right foot and was on antibiotics for for some time before the generator change and she is now off antibiotics and foot is now healing by secondary intention and apparently is doing well according to the patient. She has had some problems with chronic heart failure for which we have tried to adjust her diuretics. Her last echocardiogram was completed on August 17, 2020 demonstrated a left ventricular ejection fraction of 25 to 30% which was slightly improved from past studies and suggested some benefit from her ongoing Entresto therapy. She does have stage 3-4 chronic kidney disease which makes more aggressive diuresis a little challenging.     She is complaining of more swelling LLE>>RLE, she elevates her legs and keeps her head propped to

## 2023-04-26 NOTE — PROGRESS NOTES
Vanessa Arteaga presents today for   Chief Complaint   Patient presents with    Follow-up     7 month f/u     Device Check     Medtronic device     Shortness of Breath     SOB constant laying and sittng     Edema     Left ankle/feet swelling constant        Libby Rossi preferred language for health care discussion is english/other. Is someone accompanying this pt? yes    Is the patient using any DME equipment during OV? yes    Depression Screening:  Depression: Not at risk    PHQ-2 Score: 0        Learning Assessment:  Who is the primary learner? Patient    What is the preferred language for health care of the primary learner? ENGLISH    How does the primary learner prefer to learn new concepts? DEMONSTRATION    Answered By patient    Relationship to Learner SELF           Pt currently taking Anticoagulant therapy? no    Pt currently taking Antiplatelet therapy ? no      Coordination of Care:  1. Have you been to the ER, urgent care clinic since your last visit? Hospitalized since your last visit? no    2. Have you seen or consulted any other health care providers outside of the [address] [place] [address] since your last visit? Include any pap smears or colon screening.  no

## 2023-05-01 RX ORDER — BUMETANIDE 2 MG/1
1 TABLET ORAL DAILY
Qty: 45 TABLET | Refills: 3 | Status: SHIPPED | OUTPATIENT
Start: 2023-05-01

## 2023-05-10 ENCOUNTER — HOSPITAL ENCOUNTER (OUTPATIENT)
Facility: HOSPITAL | Age: 73
Discharge: HOME | End: 2023-05-13
Payer: MEDICARE

## 2023-05-10 DIAGNOSIS — Z12.31 SCREENING MAMMOGRAM FOR HIGH-RISK PATIENT: ICD-10-CM

## 2023-05-10 DIAGNOSIS — Z12.39 SCREENING BREAST EXAMINATION: ICD-10-CM

## 2023-05-10 PROCEDURE — 77067 SCR MAMMO BI INCL CAD: CPT

## 2023-05-25 ENCOUNTER — HOSPITAL ENCOUNTER (OUTPATIENT)
Facility: HOSPITAL | Age: 73
Discharge: HOME | End: 2023-05-25
Payer: MEDICARE

## 2023-05-25 DIAGNOSIS — I50.22 CHRONIC SYSTOLIC (CONGESTIVE) HEART FAILURE (HCC): ICD-10-CM

## 2023-05-25 LAB
ANION GAP SERPL CALC-SCNC: 5 MMOL/L (ref 3–18)
BUN SERPL-MCNC: 41 MG/DL (ref 7–18)
BUN/CREAT SERPL: 24 (ref 12–20)
CALCIUM SERPL-MCNC: 9.3 MG/DL (ref 8.5–10.1)
CHLORIDE SERPL-SCNC: 106 MMOL/L (ref 100–111)
CO2 SERPL-SCNC: 30 MMOL/L (ref 21–32)
CREAT SERPL-MCNC: 1.71 MG/DL (ref 0.6–1.3)
GLUCOSE SERPL-MCNC: 128 MG/DL (ref 74–99)
POTASSIUM SERPL-SCNC: 3.9 MMOL/L (ref 3.5–5.5)
SODIUM SERPL-SCNC: 141 MMOL/L (ref 136–145)

## 2023-05-25 PROCEDURE — 36415 COLL VENOUS BLD VENIPUNCTURE: CPT

## 2023-05-25 PROCEDURE — 80048 BASIC METABOLIC PNL TOTAL CA: CPT

## 2023-05-26 NOTE — PROGRESS NOTES
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 Visits from Start of Care: 1    Missed Visits: 1    Reporting Period : 17 to 17    Summary of Care:  Short Term Goals: To be accomplished in 1 weeks:  1. Pt will be compliant with initial HEP to improve therapy outcomes   Long Term Goals: To be accomplished in 6 weeks:  1. Pt will improve FOTO by 5 points in order to demonstrate functional improvement   2. Pt will perform sit<>supine transfer with Shon in order to improve independence with functional mobility at home  3. Pt will performing rolling with Shon in order to improve independence with functional mobility at home  4. Pt will perform sit to stand with modA in order to reduce caregiver burden with ADLs     No goals met as pt did not return to therapy after initial evaluation       ASSESSMENT/RECOMMENDATIONS:  Pt did not return to therapy after initial evaluation. She has an ulcer on her foot and self-DC d/t other medical issues. She will get a new script for PT if she chooses to return.      [x]Discontinue therapy: []Patient has reached or is progressing toward set goals      [x]Patient is non-compliant or has abdicated      []Due to lack of appreciable progress towards set goals    Madalyn Pallas, PT 3/9/2018 12:16 PM with patient

## 2023-05-30 ENCOUNTER — TELEPHONE (OUTPATIENT)
Age: 73
End: 2023-05-30

## 2023-05-30 RX ORDER — SACUBITRIL AND VALSARTAN 97; 103 MG/1; MG/1
1 TABLET, FILM COATED ORAL 2 TIMES DAILY
Qty: 180 TABLET | Refills: 3 | Status: SHIPPED | OUTPATIENT
Start: 2023-05-30

## 2023-05-30 NOTE — TELEPHONE ENCOUNTER
----- Message from Suni Sung DO sent at 5/26/2023  1:00 PM EDT -----  Her kidney function and electrolytes look great   She should be on Bumex correct? Does she notice improvement in swelling and her weight  Thanks    ----- Message -----  From: Robert Gill RN  Sent: 5/26/2023   8:23 AM EDT  To: Suni Sung DO    Per your last note:  She is complaining of more swelling LLE>>RLE, she elevates her legs and keeps her head propped to sleep.  She's currently taking 60 mg of Lasix in the am and Metolazone once a week on Weds usually but feels its not helping enough.     Telephone encounter:     Verbal order and read back per Suni Sung DO  Stop taking Lasix  Start Bumex 1 mg once daily  BMP in 4 weeks

## 2023-05-30 NOTE — TELEPHONE ENCOUNTER
Pt given results, and states her PCP has referred her to vascular, for her legs. She did say the fluid has gotten better but she is experiencing ulcers. No other concerns at this time.

## 2023-06-29 ENCOUNTER — OFFICE VISIT (OUTPATIENT)
Age: 73
End: 2023-06-29
Payer: MEDICARE

## 2023-06-29 VITALS
HEIGHT: 67 IN | SYSTOLIC BLOOD PRESSURE: 128 MMHG | WEIGHT: 232 LBS | DIASTOLIC BLOOD PRESSURE: 70 MMHG | BODY MASS INDEX: 36.41 KG/M2

## 2023-06-29 DIAGNOSIS — I70.262 ATHEROSCLEROSIS OF NATIVE ARTERIES OF EXTREMITIES WITH GANGRENE, LEFT LEG (HCC): Primary | ICD-10-CM

## 2023-06-29 DIAGNOSIS — L89.629 PRESSURE INJURY OF SKIN OF LEFT HEEL, UNSPECIFIED INJURY STAGE: ICD-10-CM

## 2023-06-29 PROCEDURE — 99204 OFFICE O/P NEW MOD 45 MIN: CPT | Performed by: SURGERY

## 2023-06-29 PROCEDURE — 1123F ACP DISCUSS/DSCN MKR DOCD: CPT | Performed by: SURGERY

## 2023-07-05 ENCOUNTER — CLINICAL DOCUMENTATION (OUTPATIENT)
Age: 73
End: 2023-07-05

## 2023-07-11 ENCOUNTER — HOSPITAL ENCOUNTER (EMERGENCY)
Facility: HOSPITAL | Age: 73
Discharge: HOME | End: 2023-07-11
Attending: EMERGENCY MEDICINE
Payer: MEDICARE

## 2023-07-11 VITALS
HEART RATE: 76 BPM | BODY MASS INDEX: 36.88 KG/M2 | RESPIRATION RATE: 17 BRPM | HEIGHT: 67 IN | OXYGEN SATURATION: 100 % | WEIGHT: 235 LBS | DIASTOLIC BLOOD PRESSURE: 89 MMHG | SYSTOLIC BLOOD PRESSURE: 136 MMHG | TEMPERATURE: 98.2 F

## 2023-07-11 DIAGNOSIS — T83.510A URINARY TRACT INFECTION ASSOCIATED WITH CYSTOSTOMY CATHETER, INITIAL ENCOUNTER (HCC): Primary | ICD-10-CM

## 2023-07-11 DIAGNOSIS — N39.0 URINARY TRACT INFECTION ASSOCIATED WITH CYSTOSTOMY CATHETER, INITIAL ENCOUNTER (HCC): Primary | ICD-10-CM

## 2023-07-11 LAB
APPEARANCE UR: CLEAR
BACTERIA URNS QL MICRO: ABNORMAL /HPF
BILIRUB UR QL: NEGATIVE
COLOR UR: YELLOW
EPITH CASTS URNS QL MICRO: ABNORMAL /LPF (ref 0–5)
GLUCOSE UR STRIP.AUTO-MCNC: NEGATIVE MG/DL
HGB UR QL STRIP: ABNORMAL
KETONES UR QL STRIP.AUTO: NEGATIVE MG/DL
LEUKOCYTE ESTERASE UR QL STRIP.AUTO: ABNORMAL
NITRITE UR QL STRIP.AUTO: POSITIVE
PH UR STRIP: 7.5 (ref 5–8)
PROT UR STRIP-MCNC: 30 MG/DL
RBC #/AREA URNS HPF: ABNORMAL /HPF (ref 0–5)
SP GR UR REFRACTOMETRY: 1.01 (ref 1–1.03)
UROBILINOGEN UR QL STRIP.AUTO: 0.2 EU/DL (ref 0.2–1)
WBC URNS QL MICRO: ABNORMAL /HPF (ref 0–4)

## 2023-07-11 PROCEDURE — 99283 EMERGENCY DEPT VISIT LOW MDM: CPT

## 2023-07-11 PROCEDURE — 51798 US URINE CAPACITY MEASURE: CPT

## 2023-07-11 PROCEDURE — 6370000000 HC RX 637 (ALT 250 FOR IP): Performed by: EMERGENCY MEDICINE

## 2023-07-11 PROCEDURE — 87086 URINE CULTURE/COLONY COUNT: CPT

## 2023-07-11 PROCEDURE — 81001 URINALYSIS AUTO W/SCOPE: CPT

## 2023-07-11 RX ORDER — NITROFURANTOIN 25; 75 MG/1; MG/1
100 CAPSULE ORAL 2 TIMES DAILY
Qty: 14 CAPSULE | Refills: 0 | Status: SHIPPED | OUTPATIENT
Start: 2023-07-11 | End: 2023-07-13

## 2023-07-11 RX ORDER — NITROFURANTOIN 25; 75 MG/1; MG/1
100 CAPSULE ORAL
Status: COMPLETED | OUTPATIENT
Start: 2023-07-11 | End: 2023-07-11

## 2023-07-11 RX ADMIN — NITROFURANTOIN MONOHYDRATE/MACROCRYSTALS 100 MG: 25; 75 CAPSULE ORAL at 21:21

## 2023-07-11 ASSESSMENT — PAIN - FUNCTIONAL ASSESSMENT: PAIN_FUNCTIONAL_ASSESSMENT: NONE - DENIES PAIN

## 2023-07-11 NOTE — ED PROVIDER NOTES
[facility] EMERGENCY DEPT  EMERGENCY DEPARTMENT ENCOUNTER      Pt Name: Elyse Almonte  MRN: [id number]  [address] [facility] 1950  Date of evaluation: 7/11/2023  Provider: Renu Rosenberg MD    CHIEF COMPLAINT       Chief Complaint   Patient presents with    Other     Suprapubic catheter backing up       HPI:  Elyse Almonte is a 67 y.o. female who presents to the emergency department pt w h/o chronic suprapubic cath due to neurogenic bladder after accident x 2 yrs per pt. Says cath freq leaks, is changed q 3 weeks at urology office. Says leaking around site now. Denies abd pain. No fever. No n/v.      HPI    Nursing Notes were reviewed. REVIEW OF SYSTEMS    (2-9 systems for level 4, 10 or more for level 5)     Review of Systems    Except as noted above the remainder of the review of systems was reviewed and negative. PAST MEDICAL HISTORY     Past Medical History:   Diagnosis Date    Abnormal nuclear cardiac imaging test 06/19/2015    Fixed distal apical, distal septal defect more likely due to RV pacing than prior infarct. No ischemia. EF 46%. RWMA c/w RV pacing. Nondiagnostic EKG on pharm stress test.      Acute paraplegia ([address]) 4/20/2017    Benign hypertensive heart disease with systolic CHF, NYHA class 2 ([address]) 9/5/2012    Biventricular implantable cardioverter-defibrillator in situ 04/28/2005    Upgraded to BiV AICD; gen change 4/2008; pocket revision 10/2009; Abdominal - done on 8/22/2012 by Dr. Neyda Roach     Chronic anemia 9/5/2012    Chronic systolic heart failure (HCC)     Decreased calculated glomerular filtration rate (GFR) 3/30/2017    Calculated GFR equivalent to that of CKD stage 3 = 30-59 ml/min    Diabetic neuropathy associated with type 2 diabetes mellitus ([address]) 6/28/2011    Difficult airway for intubation 08/22/2012    see anesthesia airway note    DVT (deep venous thrombosis) ([address]) 08/27/2012    DVT in axillary vein on left. Left subclavian was not visualized.     DVT (deep venous thrombosis)

## 2023-07-11 NOTE — ED TRIAGE NOTES
Pt wheeled to triage c/o suprapubic catheter backing up since yesterday. Pt reports having to use 3-4 depends today.

## 2023-07-11 NOTE — ED NOTES
Bladder scan completed. Volumes of 35ml, 33ml obtained. Tolerated well. New collection bag applied to suprapubic catheter to facilitate urine specimen.       Soco Morfin RN  07/11/23 1952

## 2023-07-12 NOTE — DISCHARGE INSTRUCTIONS
Return for any pain, fever not resolving with motrin or tylenol, shortness of breath, vomiting, decreased fluid intake, weakness, numbness, dizziness, or any change or concerns.

## 2023-07-15 LAB
BACTERIA SPEC CULT: NORMAL
CC UR VC: NORMAL
SERVICE CMNT-IMP: NORMAL

## 2023-07-20 ENCOUNTER — OFFICE VISIT (OUTPATIENT)
Age: 73
End: 2023-07-20

## 2023-07-20 VITALS
DIASTOLIC BLOOD PRESSURE: 60 MMHG | HEART RATE: 79 BPM | WEIGHT: 235 LBS | HEIGHT: 67 IN | OXYGEN SATURATION: 100 % | SYSTOLIC BLOOD PRESSURE: 110 MMHG | BODY MASS INDEX: 36.88 KG/M2

## 2023-07-20 DIAGNOSIS — L89.629 PRESSURE INJURY OF SKIN OF LEFT HEEL, UNSPECIFIED INJURY STAGE: Primary | ICD-10-CM

## 2023-07-20 NOTE — PROGRESS NOTES
[facility]  [address]  Dept: [phone]           Chart reviewed for the following:   Romina FONSECA MA, have reviewed the medications and updated the Allergic reactions for Meggan D [facility] performed immediately prior to start of procedure:   Romina FONSECA MA, have performed the following reviews on Megan Hayes prior to the start of the procedure:            * Patient was identified by name and date of birth   * Agreement that Otelia Ruddle boot removed and reapplied   * Procedure site verified   * Patient was positioned for comfort  * Verbal consent was given by patient     Time: 1000      Date of procedure: 7/20/2023    Procedure performed by: Arlen Bernal MD    How tolerated by patient:Tolerated                                                                                       Comments:  Applied aquaphor, iodosorb, and K2 wrap to left leg.   Dressing change 2xwk
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, left leg ([facility])    Pressure injury of skin of left heel     Current Outpatient Medications   Medication Sig Dispense Refill    sacubitril-valsartan (ENTRESTO)  MG per tablet Take 1 tablet by mouth 2 times daily 180 tablet 3    bumetanide (BUMEX) 2 MG tablet Take 0.5 tablets by mouth daily 45 tablet 3    BYDUREON BCISE 2 MG/0.85ML injection INJECT 1 (ONE) INJECTION UNDER THE SKIN WEEKLY      Multiple Vitamin (MULTIVITAMIN ADULT PO) Take 1 tablet by mouth daily      insulin glargine (LANTUS) 100 UNIT/ML injection vial Inject 10 Units into the skin nightly      Cranberry (THERACRAN PO) Take by mouth daily      Cobalamin Combinations (VITAMIN B12-FOLIC ACID PO) Take by mouth daily 2 tabs by mouth daily 1000-400 mcg Lozg      metOLazone (ZAROXOLYN) 2.5 MG tablet TAKE 1 TABLET BY MOUTH TWICE WEEKLY 24 tablet 3    carvedilol (COREG) 6.25 MG tablet Take 1 tablet by mouth 2 times daily      vitamin D3 (CHOLECALCIFEROL) 125 MCG (5000 UT) TABS tablet Take 1 tablet by mouth daily      gabapentin (NEURONTIN) 300 MG capsule Take 2 capsules by mouth 2 times daily. hydrOXYzine HCl (ATARAX) 25 MG tablet TAKE 1 TABLET BY MOUTH EVERY DAY       No current facility-administered medications for this visit.      Allergies   Allergen Reactions    Latex Itching    Vancomycin Itching    Atorvastatin Myalgia    Blueberry Swelling     Causes throat swelling    Ciprofloxacin Itching    Codeine Other (See Comments)     Jumpy feeling    Ezetimibe-Simvastatin Myalgia    Hydrocodone-Acetaminophen Itching

## 2023-08-14 ENCOUNTER — OFFICE VISIT (OUTPATIENT)
Age: 73
End: 2023-08-14
Payer: MEDICARE

## 2023-08-14 VITALS
HEART RATE: 68 BPM | SYSTOLIC BLOOD PRESSURE: 122 MMHG | DIASTOLIC BLOOD PRESSURE: 73 MMHG | HEIGHT: 67 IN | BODY MASS INDEX: 36.88 KG/M2 | WEIGHT: 235 LBS | OXYGEN SATURATION: 100 %

## 2023-08-14 DIAGNOSIS — L89.629 PRESSURE INJURY OF SKIN OF LEFT HEEL, UNSPECIFIED INJURY STAGE: Primary | ICD-10-CM

## 2023-08-14 PROCEDURE — 99213 OFFICE O/P EST LOW 20 MIN: CPT | Performed by: SURGERY

## 2023-08-14 PROCEDURE — 1123F ACP DISCUSS/DSCN MKR DOCD: CPT | Performed by: SURGERY

## 2023-08-14 NOTE — PROGRESS NOTES
Joe Duncan    Chief Complaint   Patient presents with    Leg Swelling     Follow up        History and Physical    Joe Duncan is a 68 y.o. female with PMH significant for CAD, CHF on diuretics, pacemaker/AICD, IDDM last A1c 6.8, CKD with Cr 1.7, GFR 31, SANJU not on CPAP, morbid obesity with BMI > 36,   Patient in motorized wheelchair since 2017, unable to walk. she returns today for left heel ulcer. Current wound care:   Betadine to wound, K2 wrap    Patient states that since her last visit, she was evaluated at [facility] and will receive ONE compression garment for the left. No garment is planned for the right    Today:           Last visit:         Previous visit      Previously obtained venous history:   she describes  a new wound on the left heel that she noticed about 3 weeks ago. It started out with a blister which progressed to form an eschar. This is not painful and has not been red and draining any fluid. She states her home health nurse is putting wet to dry dressings on it. She has a h/o right heel ulcer after trauma. This healed with wound care but required Kerecis skin substitute. She states that she has worn out the off loading  booties she wears at night. Onset of symptoms was years ago and have been worsening. Associated symptoms:   [x] edema  [] varicose veins  [] heaviness/aching  [] fatigue  [] Pain  [x]  current ulcer(s)    Patient   [] has   [x] has not   been wearing compression stockings. Relevant history:   [] female gender  [] Family history of venous disease: n/a  [] history of DVT/PE  [x] history of vein procedure - possible vein closure RLE - patient unsure      The most recent PVL was reviewed and discussed with the patient. This is from 3 years ago and is a BLE KATHY. This shows normal ABIs bilaterally. PVR was performed in the office today.  This shows a normal resting arterial exam with TBI > 0.6 bilaterally            Past Medical History:

## 2023-08-14 NOTE — PROGRESS NOTES
[facility]  [address]  Dept: [phone]           Chart reviewed for the following:   Javier FONSECA MA, have reviewed the medications and updated the Allergic reactions for Meggan D [facility] performed immediately prior to start of procedure:   Javier FONSECA MA, have performed the following reviews on Silver Rin prior to the start of the procedure:            * Patient was identified by name and date of birth   * Agreement that Karen Aye boot removed and reapplied   * Procedure site verified   * Patient was positioned for comfort  * Verbal consent was given by patient     Time: [phone]      Date of procedure: 8/14/2023    Procedure performed by:   Lizzie Thomason MD    How tolerated by patient: Tolerated                                                                                   Comments:  Applied iodosord to heel and K2 wrap to left leg

## 2023-08-15 ENCOUNTER — HOSPITAL ENCOUNTER (OUTPATIENT)
Facility: HOSPITAL | Age: 73
Discharge: HOME | End: 2023-08-18
Attending: UROLOGY
Payer: MEDICARE

## 2023-08-15 DIAGNOSIS — N20.0 KIDNEY STONE: ICD-10-CM

## 2023-08-15 PROCEDURE — 74176 CT ABD & PELVIS W/O CONTRAST: CPT

## 2023-09-14 ENCOUNTER — OFFICE VISIT (OUTPATIENT)
Age: 73
End: 2023-09-14
Payer: MEDICARE

## 2023-09-14 DIAGNOSIS — L89.629 PRESSURE INJURY OF SKIN OF LEFT HEEL, UNSPECIFIED INJURY STAGE: Primary | ICD-10-CM

## 2023-09-14 PROBLEM — I70.262 ATHEROSCLEROSIS OF NATIVE ARTERIES OF EXTREMITIES WITH GANGRENE, LEFT LEG (HCC): Status: RESOLVED | Noted: 2023-06-29 | Resolved: 2023-09-14

## 2023-09-14 PROCEDURE — 99213 OFFICE O/P EST LOW 20 MIN: CPT | Performed by: SURGERY

## 2023-09-14 PROCEDURE — 1123F ACP DISCUSS/DSCN MKR DOCD: CPT | Performed by: SURGERY

## 2023-09-14 NOTE — PROGRESS NOTES
[facility]  [address]  Dept: [phone]           Chart reviewed for the following:   Jina FONSECA MA, have reviewed the medications and updated the Allergic reactions for Meggan D [address] [place] [address] performed immediately prior to start of procedure:   Jina FONSECA MA, have performed the following reviews on Srinivasa Castillo prior to the start of the procedure:            * Patient was identified by name and date of birth   * Agreement that Gissel Fallow boot removed and reapplied   * Procedure site verified   * Patient was positioned for comfort  * Verbal consent was given by patient     Time: 1130      Date of procedure: 9/14/2023    Procedure performed by: Lucas Sanchez MD    How tolerated by patient: Tolerated                                                                               Comments:  Applied iodosorb to heel, a pad over top of foot and K2 wrap to left leg.
Surgery          PLEASE NOTE:  This document has been produced using voice recognition software. Unrecognized errors in transcription may be present.

## 2023-09-15 VITALS
OXYGEN SATURATION: 99 % | HEART RATE: 66 BPM | HEIGHT: 67 IN | RESPIRATION RATE: 18 BRPM | BODY MASS INDEX: 36.88 KG/M2 | SYSTOLIC BLOOD PRESSURE: 115 MMHG | DIASTOLIC BLOOD PRESSURE: 60 MMHG | WEIGHT: 235 LBS

## 2023-09-15 ASSESSMENT — PATIENT HEALTH QUESTIONNAIRE - PHQ9
1. LITTLE INTEREST OR PLEASURE IN DOING THINGS: 0
SUM OF ALL RESPONSES TO PHQ QUESTIONS 1-9: 0
2. FEELING DOWN, DEPRESSED OR HOPELESS: 0
SUM OF ALL RESPONSES TO PHQ9 QUESTIONS 1 & 2: 0
SUM OF ALL RESPONSES TO PHQ QUESTIONS 1-9: 0

## 2023-09-26 NOTE — PROGRESS NOTES
Patience Halsted    Chief Complaint   Patient presents with    Leg Pain     Follow up        History and Physical    Patience Halsted is a 68 y.o. female with PMH significant for CAD, CHF on diuretics, pacemaker/AICD, IDDM last A1c 6.8, CKD with Cr 1.7, GFR 31, SANJU not on CPAP, morbid obesity with BMI > 36,   Patient in motorized wheelchair since 2017, unable to walk. she returns today for left heel ulcer. Current wound care:   Betadine to wound, K2 wrap    Since her last visit, there have been no changes with regards to her wound. Continues with home health. She tells me today that her  has made the decision to stop treatment for his aggressive, metastatic prostate cancer and has transitioned to hospice/comfort care only. She is understandably very emotional about this. Today:                 Last visit:                         Last visit:         Previously obtained venous history:   she describes  a new wound on the left heel that she noticed about 3 weeks ago. It started out with a blister which progressed to form an eschar. This is not painful and has not been red and draining any fluid. She states her home health nurse is putting wet to dry dressings on it. She has a h/o right heel ulcer after trauma. This healed with wound care but required Kerecis skin substitute. She states that she has worn out the off loading  booties she wears at night. Onset of symptoms was years ago and have been worsening. Associated symptoms:   [x] edema  [] varicose veins  [] heaviness/aching  [] fatigue  [] Pain  [x]  current ulcer(s)    Patient   [] has   [x] has not   been wearing compression stockings. Relevant history:   [] female gender  [] Family history of venous disease: n/a  [] history of DVT/PE  [x] history of vein procedure - possible vein closure RLE - patient unsure      The most recent PVL was reviewed and discussed with the patient. This is from 3 years ago and is a BLE KATHY.  This

## 2023-09-27 ENCOUNTER — OFFICE VISIT (OUTPATIENT)
Age: 73
End: 2023-09-27

## 2023-09-27 VITALS
OXYGEN SATURATION: 100 % | HEIGHT: 67 IN | SYSTOLIC BLOOD PRESSURE: 130 MMHG | HEART RATE: 73 BPM | DIASTOLIC BLOOD PRESSURE: 70 MMHG | BODY MASS INDEX: 36.88 KG/M2 | WEIGHT: 235 LBS

## 2023-09-27 DIAGNOSIS — L89.629 PRESSURE INJURY OF SKIN OF LEFT HEEL, UNSPECIFIED INJURY STAGE: Primary | ICD-10-CM

## 2023-09-27 NOTE — PROGRESS NOTES
[facility]  [address]  Dept: [phone]           Chart reviewed for the following:   Hilary FONSECA MA, have reviewed the medications and updated the Allergic reactions for Meggan D [address] [place] [address] performed immediately prior to start of procedure:   Hilary FONSECA MA, have performed the following reviews on Bianca Hopkins prior to the start of the procedure:            * Patient was identified by name and date of birth   * Agreement that Rama Primus boot removed and reapplied   * Procedure site verified   * Patient was positioned for comfort  * Verbal consent was given by patient     Time: 1100      Date of procedure: 9/27/2023    Procedure performed by: Socrates Mcrae MD    How tolerated by patient:Tolerated                                                                                   Comments:  Applied 2 ABD Pads to top of foot. Iodosorb to heel and a mepilex and K2.

## 2023-10-19 ENCOUNTER — OFFICE VISIT (OUTPATIENT)
Age: 73
End: 2023-10-19
Payer: MEDICARE

## 2023-10-19 VITALS
HEART RATE: 84 BPM | OXYGEN SATURATION: 100 % | SYSTOLIC BLOOD PRESSURE: 118 MMHG | WEIGHT: 235 LBS | BODY MASS INDEX: 36.88 KG/M2 | DIASTOLIC BLOOD PRESSURE: 62 MMHG | HEIGHT: 67 IN

## 2023-10-19 DIAGNOSIS — L89.629 PRESSURE INJURY OF SKIN OF LEFT HEEL, UNSPECIFIED INJURY STAGE: Primary | ICD-10-CM

## 2023-10-19 PROCEDURE — 1123F ACP DISCUSS/DSCN MKR DOCD: CPT | Performed by: SURGERY

## 2023-10-19 PROCEDURE — 99213 OFFICE O/P EST LOW 20 MIN: CPT | Performed by: SURGERY

## 2023-10-19 NOTE — PROGRESS NOTES
Vonda Garcia    Chief Complaint   Patient presents with    Leg Swelling     Follow up        History and Physical    Vonda Garcia is a 68 y.o. female with PMH significant for CAD, CHF on diuretics, pacemaker/AICD, IDDM last A1c 6.8, CKD with Cr 1.7, GFR 31, SANJU not on CPAP, morbid obesity with BMI > 36,   Patient in motorized wheelchair since 2017, unable to walk. she returns today for left heel ulcer. Current wound care:   Betadine to wound, K2 wrap    Since her last visit, there have been no changes with regards to her wound per the patient. Continues with home health. Review of chart shows that wound has been enlarging      Today:         Last time:              Previously obtained venous history:   she describes  a new wound on the left heel that she noticed about 3 weeks ago. It started out with a blister which progressed to form an eschar. This is not painful and has not been red and draining any fluid. She states her home health nurse is putting wet to dry dressings on it. She has a h/o right heel ulcer after trauma. This healed with wound care but required Kerecis skin substitute. She states that she has worn out the off loading  booties she wears at night. Onset of symptoms was years ago and have been worsening. Associated symptoms:   [x] edema  [] varicose veins  [] heaviness/aching  [] fatigue  [] Pain  [x]  current ulcer(s)    Patient   [] has   [x] has not   been wearing compression stockings. Relevant history:   [] female gender  [] Family history of venous disease: n/a  [] history of DVT/PE  [x] history of vein procedure - possible vein closure RLE - patient unsure      The most recent PVL was reviewed and discussed with the patient. This is from 3 years ago and is a BLE KATHY. This shows normal ABIs bilaterally. PVR was performed in the office today.  This shows a normal resting arterial exam with TBI > 0.6 bilaterally            Past Medical History:   Diagnosis Date

## 2023-10-24 ENCOUNTER — CLINICAL DOCUMENTATION (OUTPATIENT)
Age: 73
End: 2023-10-24

## 2023-10-24 NOTE — PROGRESS NOTES
Faxed over referral, office notes, and studies to Dr. Vaughan Citizen at [facility], faxed confirmed at 7:20 am Restrict portions on carbohydrates, especially rice bread pasta and potatoes, to a fist-sized portion per meal.   [employer]

## 2023-11-09 ENCOUNTER — NURSE ONLY (OUTPATIENT)
Age: 73
End: 2023-11-09

## 2023-11-09 ENCOUNTER — OFFICE VISIT (OUTPATIENT)
Age: 73
End: 2023-11-09

## 2023-11-09 VITALS
SYSTOLIC BLOOD PRESSURE: 110 MMHG | BODY MASS INDEX: 36.81 KG/M2 | DIASTOLIC BLOOD PRESSURE: 60 MMHG | OXYGEN SATURATION: 92 % | HEIGHT: 67 IN | HEART RATE: 84 BPM

## 2023-11-09 DIAGNOSIS — I50.22 CHRONIC SYSTOLIC (CONGESTIVE) HEART FAILURE (HCC): Primary | ICD-10-CM

## 2023-11-09 DIAGNOSIS — Z95.0 BIVENTRICULAR CARDIAC PACEMAKER IN SITU: ICD-10-CM

## 2023-11-09 DIAGNOSIS — I42.8 NONISCHEMIC CARDIOMYOPATHY (HCC): ICD-10-CM

## 2023-11-09 DIAGNOSIS — I11.0 HYPERTENSIVE HEART DISEASE WITH HEART FAILURE (HCC): ICD-10-CM

## 2023-11-09 DIAGNOSIS — I42.8 NONISCHEMIC CARDIOMYOPATHY (HCC): Primary | ICD-10-CM

## 2023-11-09 PROBLEM — R80.9 PROTEINURIA: Status: ACTIVE | Noted: 2021-03-29

## 2023-11-09 RX ORDER — FOLIC ACID 1 MG/1
1000 TABLET ORAL DAILY
COMMUNITY
Start: 2023-09-26

## 2023-11-09 RX ORDER — INSULIN LISPRO 100 [IU]/ML
INJECTION, SOLUTION INTRAVENOUS; SUBCUTANEOUS
COMMUNITY
Start: 2023-11-07

## 2023-11-09 RX ORDER — KETOCONAZOLE 20 MG/ML
SHAMPOO TOPICAL DAILY PRN
COMMUNITY

## 2023-11-09 RX ORDER — CYCLOBENZAPRINE HCL 10 MG
10 TABLET ORAL 3 TIMES DAILY PRN
COMMUNITY

## 2023-11-09 RX ORDER — FUROSEMIDE 40 MG/1
40 TABLET ORAL DAILY
COMMUNITY
End: 2023-11-09

## 2023-11-09 RX ORDER — METRONIDAZOLE 250 MG/1
500 TABLET ORAL EVERY 8 HOURS
COMMUNITY
End: 2023-11-09

## 2023-11-09 RX ORDER — NYSTATIN 100000 [USP'U]/G
POWDER TOPICAL 2 TIMES DAILY
COMMUNITY

## 2023-11-09 ASSESSMENT — PATIENT HEALTH QUESTIONNAIRE - PHQ9
SUM OF ALL RESPONSES TO PHQ QUESTIONS 1-9: 0
SUM OF ALL RESPONSES TO PHQ QUESTIONS 1-9: 0
SUM OF ALL RESPONSES TO PHQ9 QUESTIONS 1 & 2: 0
SUM OF ALL RESPONSES TO PHQ QUESTIONS 1-9: 0
2. FEELING DOWN, DEPRESSED OR HOPELESS: 0
1. LITTLE INTEREST OR PLEASURE IN DOING THINGS: 0
SUM OF ALL RESPONSES TO PHQ QUESTIONS 1-9: 0

## 2023-11-09 NOTE — PROGRESS NOTES
Valerie Cash presents today for   Chief Complaint   Patient presents with    6 Month Follow-Up    Edema     Edema to left leg       Alessio Melendezbirgit Rossi preferred language for health care discussion is english/other. Is someone accompanying this pt? no    Is the patient using any DME equipment during OV? no    Depression Screening:  Depression: Not at risk (11/9/2023)    PHQ-2     PHQ-2 Score: 0        Learning Assessment:  Who is the primary learner? Patient    What is the preferred language for health care of the primary learner? ENGLISH    How does the primary learner prefer to learn new concepts? DEMONSTRATION    Answered By patient    Relationship to Learner SELF           Pt currently taking Anticoagulant therapy? no    Pt currently taking Antiplatelet therapy ? no      Coordination of Care:  1. Have you been to the ER, urgent care clinic since your last visit? Hospitalized since your last visit? no    2. Have you seen or consulted any other health care providers outside of the [address] [place] [address] since your last visit? Include any pap smears or colon screening.  no

## 2023-11-09 NOTE — PROGRESS NOTES
Lien Wilkerson    Chief Complaint   Patient presents with    6 Month Follow-Up    Edema     Edema to left leg       HPI    Lien Wilkerson is a 68 y.o. -American female with history of a dilated cardiomyopathy with mild left ventricular dysfunction and an ejection fraction in the 40 to 45% range with widely patent coronary arteries and a cardiac catheterization back in August 1996 who followed with Dr. Gage Perdomo for years. An echocardiogram completed on May 5, 2017 suggested an ejection fraction of 50%. She did have a biventricular AICD placed but due to trauma and infection in that area she had that removed and now she has a biventricular pacemaker with pulse generator residing in her left upper quadrant placed back in October 2012. 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 She had her pacemaker generator changed by Dr. Vamsi Andrade back on February 26, 2019. It should be noted that she had an infected right foot and was on antibiotics for for some time before the generator change and she is now off antibiotics and foot is now healing by secondary intention and apparently is doing well according to the patient. She has had some problems with chronic heart failure for which we have tried to adjust her diuretics. Her last echocardiogram was completed on August 17, 2020 demonstrated a left ventricular ejection fraction of 25 to 30% which was slightly improved from past studies and suggested some benefit from her ongoing Entresto therapy. She does have stage 3-4 chronic kidney disease which makes more aggressive diuresis a little challenging. She is complaining of more swelling LLE>>RLE, she elevates her legs and keeps her head propped to sleep.  She's currently taking 60 mg of Lasix in the am and Metolazone once a week on Weds usually but feels its not helping

## 2023-11-14 ENCOUNTER — CLINICAL DOCUMENTATION (OUTPATIENT)
Age: 73
End: 2023-11-14

## 2023-11-14 NOTE — PROGRESS NOTES
11/14/2023 Patient called office to cancel appointment with Dr Rivka Newton on 11/16/2023. She does not feel she needs an appointment at this time, she is following up with Dr. Tosin Gutierrez with podiatry. She will contact us when she is ready to reschedule.  VC

## 2023-11-14 NOTE — RESULT ENCOUNTER NOTE
Device check personally reviewed by me. Chronically elevated RV threshold noted. Otherwise normal device function on interrogation. See scanned interrogation document for complete details.

## 2023-12-02 NOTE — TELEPHONE ENCOUNTER
----- Message from Elva Marie DO sent at 4/18/2018  8:38 AM EDT -----  This is a little on the high side, but she can't take other statins and I don't think she can tolerate increasing Pravastatin so just tell her it is a little high and she might increase the fruits and vegetables a little in her diet and decrease animal protein a little.  ES  used

## 2023-12-13 RX ORDER — CARVEDILOL 6.25 MG/1
6.25 TABLET ORAL 2 TIMES DAILY
Qty: 180 TABLET | Refills: 3 | Status: SHIPPED | OUTPATIENT
Start: 2023-12-13

## 2024-02-08 ENCOUNTER — NURSE ONLY (OUTPATIENT)
Age: 74
End: 2024-02-08

## 2024-02-08 DIAGNOSIS — Z95.0 BIVENTRICULAR CARDIAC PACEMAKER IN SITU: Primary | ICD-10-CM

## 2024-02-08 DIAGNOSIS — I42.8 NONISCHEMIC CARDIOMYOPATHY (HCC): ICD-10-CM

## 2024-02-09 NOTE — RESULT ENCOUNTER NOTE
Device check personally reviewed by me.  Normal device function on interrogation.  Patient's battery approaching REJI.  Agree with rechecking in 6 weeks.  See scanned interrogation document for complete details.

## 2024-02-25 NOTE — TELEPHONE ENCOUNTER
Dr. Martín Tipton was consulted in the hospital on the patient, but there was nothing from a spine perspective to do and he signed off. She will have to medical handle the magnesium and have neurology see her for the tremors. Looks like Dr Nikolas Benson was consulted for neuro while in the hospital also. DISCHARGE

## 2024-03-28 ENCOUNTER — NURSE ONLY (OUTPATIENT)
Age: 74
End: 2024-03-28
Payer: MEDICARE

## 2024-03-28 DIAGNOSIS — Z95.0 BIVENTRICULAR CARDIAC PACEMAKER IN SITU: Primary | ICD-10-CM

## 2024-03-28 DIAGNOSIS — I42.8 NONISCHEMIC CARDIOMYOPATHY (HCC): ICD-10-CM

## 2024-04-02 PROCEDURE — 93288 INTERROG EVL PM/LDLS PM IP: CPT | Performed by: INTERNAL MEDICINE

## 2024-04-02 NOTE — RESULT ENCOUNTER NOTE
Device check personally reviewed by me.  Device approaching elective replacement time, would recheck in 1 month, otherwise normal device function on interrogation.  See scanned interrogation document for complete details.

## 2024-04-26 NOTE — TELEPHONE ENCOUNTER
Jena Castillo from [employer] need end of therapy date Office # [phone] Fax# [phone] Refill Decision Note   Anderson Reyes  is requesting a refill authorization.  Brief Assessment and Rationale for Refill:  Quick Discontinue     Medication Therapy Plan:  PT is now on 200 mg    Medication Reconciliation Completed: No   Comments:     No Care Gaps recommended.     Note composed:11:34 PM 04/25/2024

## 2024-05-02 ENCOUNTER — OFFICE VISIT (OUTPATIENT)
Age: 74
End: 2024-05-02
Payer: MEDICARE

## 2024-05-02 VITALS
HEIGHT: 67 IN | DIASTOLIC BLOOD PRESSURE: 62 MMHG | BODY MASS INDEX: 36.81 KG/M2 | SYSTOLIC BLOOD PRESSURE: 120 MMHG | HEART RATE: 67 BPM | OXYGEN SATURATION: 97 %

## 2024-05-02 DIAGNOSIS — I50.23 ACUTE ON CHRONIC SYSTOLIC CONGESTIVE HEART FAILURE (HCC): ICD-10-CM

## 2024-05-02 DIAGNOSIS — I10 ESSENTIAL (PRIMARY) HYPERTENSION: ICD-10-CM

## 2024-05-02 DIAGNOSIS — E78.5 HYPERLIPIDEMIA, UNSPECIFIED HYPERLIPIDEMIA TYPE: ICD-10-CM

## 2024-05-02 DIAGNOSIS — R06.02 SHORTNESS OF BREATH: Primary | ICD-10-CM

## 2024-05-02 DIAGNOSIS — I42.8 NONISCHEMIC CARDIOMYOPATHY (HCC): ICD-10-CM

## 2024-05-02 DIAGNOSIS — Z95.0 BIVENTRICULAR CARDIAC PACEMAKER IN SITU: ICD-10-CM

## 2024-05-02 PROCEDURE — 3074F SYST BP LT 130 MM HG: CPT | Performed by: NURSE PRACTITIONER

## 2024-05-02 PROCEDURE — 3078F DIAST BP <80 MM HG: CPT | Performed by: NURSE PRACTITIONER

## 2024-05-02 PROCEDURE — 1123F ACP DISCUSS/DSCN MKR DOCD: CPT | Performed by: NURSE PRACTITIONER

## 2024-05-02 PROCEDURE — 99214 OFFICE O/P EST MOD 30 MIN: CPT | Performed by: NURSE PRACTITIONER

## 2024-05-02 RX ORDER — BENZONATATE 100 MG/1
100 CAPSULE ORAL 3 TIMES DAILY PRN
COMMUNITY

## 2024-05-02 RX ORDER — LORATADINE 10 MG/1
10 TABLET ORAL DAILY
COMMUNITY

## 2024-05-02 ASSESSMENT — PATIENT HEALTH QUESTIONNAIRE - PHQ9
2. FEELING DOWN, DEPRESSED OR HOPELESS: NOT AT ALL
SUM OF ALL RESPONSES TO PHQ QUESTIONS 1-9: 0
1. LITTLE INTEREST OR PLEASURE IN DOING THINGS: NOT AT ALL
SUM OF ALL RESPONSES TO PHQ9 QUESTIONS 1 & 2: 0
SUM OF ALL RESPONSES TO PHQ QUESTIONS 1-9: 0

## 2024-05-02 ASSESSMENT — ENCOUNTER SYMPTOMS
NAUSEA: 0
COUGH: 1
CHEST TIGHTNESS: 0
DIARRHEA: 0
SHORTNESS OF BREATH: 1
CONSTIPATION: 0
WHEEZING: 0
ABDOMINAL DISTENTION: 1
BLOOD IN STOOL: 0
VOMITING: 0

## 2024-05-02 NOTE — PROGRESS NOTES
Meggan Rossi presents today for   Chief Complaint   Patient presents with    Follow-up     1 month       Meggan Rossi preferred language for health care discussion is english/other.    Is someone accompanying this pt? no    Is the patient using any DME equipment during OV? no    Depression Screening:  Depression: Not at risk (5/2/2024)    PHQ-2     PHQ-2 Score: 0        Learning Assessment:  Who is the primary learner? Patient    What is the preferred language for health care of the primary learner? ENGLISH    How does the primary learner prefer to learn new concepts? DEMONSTRATION    Answered By patient    Relationship to Learner SELF           Pt currently taking Anticoagulant therapy? no    Pt currently taking Antiplatelet therapy ? no      Coordination of Care:  1. Have you been to the ER, urgent care clinic since your last visit? Hospitalized since your last visit? no    2. Have you seen or consulted any other health care providers outside of the [facility] since your last visit? Include any pap smears or colon screening. no    
followed by urology, now with suprapubic catheter    Mrs. Rossi was seen today for a possible pre-procedure history and physical for a device generator change. Her device was interrogated today and it was not at REJI yet.  However, it was noted that her Opti-vol has been increasing since 4/2/24 and she remains above threshold.     She has fine crackles in the bases.  She is unable to weigh but she is complaining of abdominal fullness/bloating, increasing lower extremity edema, shortness of breath when talking and when lying down.  She also complains of a cough when lying down but improves when she sits up.      Her normal dose of bumetanide is 1mg daily along with metolazone 2.5mg one day a week.  Will increase her bumetanide to 2mg daily for one week (from 5/3/24 through 5/9/24).  She was instructed to take an additional dose of metolazone on 5/3/24, at least 30 minutes prior to her bumetanide dose.  She was given a lab slip for a BMP and NT pro-BNP to be done today or tomorrow.     She is already scheduled to see Dr Guzmán next week so her acute on chronic CHF can be reevaluated at that time.     Dr. Gardner came in and spoke with her regarding the device being in her abdomen and the need for generator change in the near future.     Congestive heart failure teaching reinforced today.  Advised to limit sodium intake to no more than 2000mg per day and to also watch fluid intake.  She states that she drinks between 48 and 56 ounces per day.  Advised to weigh daily every morning and record weights (not possible).  Instructed to call our office if progressive weight gain is noted over a 2 to 3 day period of time, shortness of breath increases, or if abdominal bloating, nausea, fatigue, or increased lower extremity edema is noted.     She will follow-up with Dr. Guzmán as scheduled and PRN.      Tila Jackson MSN, FNP-BC    Please note:  Portions of this chart were created with Dragon medical speech to text

## 2024-05-02 NOTE — PATIENT INSTRUCTIONS
BMP and NT pro-BNP   Increase bumetanide to 2mg once a day for one week (this Friday through Thurs. 5/9/24)  Continue metolazone 2.5mg once a week but take a dose on Fri. 5/3/24 (at least 30 minutes prior to your bumetanide dose)  Follow-up in one week with Dr. Guzmán as scheduled

## 2024-05-03 NOTE — PROGRESS NOTES
HPI:    I saw Radha Francis in my office today in cardiovascular evaluation regarding her dilated nonischemic cardiomyopathy. Ms. Vanesa Francis is a very pleasant 78 year old fair skinned  female with history of a dilated cardiomyopathy with mild left ventricular dysfunction with an ejection fraction of 40-45% range with widely patent coronary arteries on a cardiac catheterization back in August of 1996. Her very last echocardiogram in June of 2015 showed that her ejection fraction was still very mildly decreased at 45-50%. She did have a biventricular AICD placed, but due to trauma and infection in that area that had to be removed and now she has a biventricular pacemaker with a pulse generator residing in her left upper quadrant, placed back in October of 2012.  
   
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 She comes in today and unfortunately has developed an ulcer on her foot which appears to be quite deep and possibly involved with osteomyelitis for which she is on intravenous Zyvox. She continues to have some lower extremity edema and 2 pillow orthopnea but denies any other cardiovascular complaints. Encounter Diagnoses Name Primary?  Nonischemic cardiomyopathy ([address] Yes  Chronic systolic heart failure ([address]  Left bundle branch block (LBBB) on electrocardiogram   
 Biventricular cardiac pacemaker in situ  Benign hypertensive heart disease with systolic CHF, NYHA class 2 ([address]  Dyslipidemia  Paraplegia ([address] Discussion: This lady is doing fairly well, but she is a little bit more short of breath with activity like shifting from her wheelchair to bed and she does feel a little bit more fatigued.   She does have some rales today in her bases and I suspect that her heart failure is a little bit worse so I am going to increase her Lasix from 40 mg to 60 mg daily and check a BNP on her just to see if that is increasing as compared to the one we did in October which was mildly elevated. Will then plan to check a BMP and a BNP in a couple of weeks. Her latest lipid profile which was completed on August 17, 2018 was reasonably good with total cholesterol 127, triglycerides 117, HDL 42, LDL of 61.6, and VLDL of 23.4 which I think is a reasonably good level for patient who is not on any statin therapy currently. Her hemoglobin A1c back in August was 10.2 and she is working diligently on her diabetes see if we can get that better controlled. We did check her Medtronic biventricular pacer today and she has 2 months remaining of battery life until it reaches RICK. She was a paced 2% of the time and V paced 99.8% of time with no high ventricular rates or atrial rates. We are going to have to check her again in a month and once she gets to RICK we will be scheduling her for a battery replacement which will probably be in February 2019. Her blood pressure is well-controlled today and she is otherwise doing well so I am simply get a plan to see her again in a few months. PCP: Ritu Todd MD  
 
  
Past Medical History:  
Diagnosis Date  Acute paraplegia ([facility] 4/20/2017  Benign hypertensive heart disease with systolic CHF, NYHA class 2 ([facility] 9/5/2012  Biventricular implantable cardioverter-defibrillator in situ 04/28/2005 Upgraded to BiV AICD; gen change 4/2008; pocket revision 10/2009; Abdominal - done on 8/22/2012 by Dr. Cadena Cargo  Cardiac cath 08/15/1996 Patent coronaries. Elev LVEDP. EF 50-55%.  Cardiac echocardiogram 06/23/2015 Ltd study. EF 45-50%. Mild, diffuse hypk. Severe apical hypk. No mass or thrombus was clearly identified, although imaging was suboptimal.    
 Cardiac nuclear imaging test 06/19/2015 Fixed distal apical, distal septal defect more likely due to RV pacing than prior infarct. No ischemia. EF 46%. RWMA c/w RV pacing. Nondiagnostic EKG on pharm stress test.    
 Cardiovascular lower extremity venous duplex 09/04/2012 Acute, non-occlusive DVT in CFV on right. No DVT on left. No superficial thrombosis bilaterally.  Cardiovascular upper extremity venous duplex 08/27/2012 DVT in axillary vein on left. Left subclavian was not visualized.  Chronic anemia 9/5/2012  Chronic systolic heart failure ([address]  Decreased calculated glomerular filtration rate (GFR) 3/30/2017 Calculated GFR equivalent to that of CKD stage 3 = 30-59 ml/min  Diabetic neuropathy associated with type 2 diabetes mellitus ([address] 6/28/2011  Difficult airway for intubation 08/22/2012  
 see anesthesia airway note  Dyslipidemia 6/28/2011  Gout  History of complete heart block 6/28/2011  History of Coumadin therapy Anticoagulation for DVT of the LUE; Discontinued on 3/30/2017  History of deep venous thrombosis 9/5/2012 Left upper extremity  History of pyelonephritis 3/30/2017  Left bundle branch block (LBBB) on electrocardiogram 6/28/2011  Nonischemic cardiomyopathy ([address] 6/28/2011  Obesity (BMI 35.0-39.9 without comorbidity) 3/13/2017  Obstructive sleep apnea on CPAP 2/7/2012  Psoas abscess, right ([address] 4/20/2017  Psoas hematoma, right, secondary to anticoagulant therapy 3/30/2017  Type 2 diabetes mellitus with diabetic neuropathy ([address] 6/28/2011 Past Surgical History:  
Procedure Laterality Date  HX CARPAL TUNNEL RELEASE  4/07 right [address]  HX OTHER SURGICAL  6/11/2012 AICD revision  HX PACEMAKER  4/28/2005 Medtroic AICD Current Outpatient Medications Medication Sig  
 metFORMIN ER (GLUCOPHAGE XR) 500 mg tablet  linezolid (ZYVOX) 600 mg/300 mL solp 600 mg by IntraVENous route every twelve (12) hours.  hydrOXYzine HCl (ATARAX) 10 mg tablet Take 1 Tab by mouth three (3) times daily as needed for Itching for up to 10 days.  ondansetron (ZOFRAN ODT) 4 mg disintegrating tablet 1 Tab by SubLINGual route every six (6) hours as needed.  meropenem 1 g IV syringe 1 g by IntraVENous route every eight (8) hours for 45 days.  baclofen (LIORESAL) 10 mg tablet Take 1 Tab by mouth three (3) times daily.  cholecalciferol, VITAMIN D3, (VITAMIN D3) 5,000 unit tab tablet Take  by mouth daily.  carvedilol (COREG) 6.25 mg tablet 6.25 mg two (2) times a day.  ferrous sulfate 325 mg (65 mg iron) tablet Take 1 Tab by mouth two (2) times daily (with meals).  Lactobacillus Acidoph & Bulgar (FLORANEX) 1 million cell tab tablet Take 1 Tab by mouth two (2) times a day.  furosemide (LASIX) 40 mg tablet 1 tab daily  Indications: Edema (Patient taking differently: 40 mg. 1 tab daily  Indications: Edema, patient taking 60mg for the past 2 weeks)  gabapentin (NEURONTIN) 300 mg capsule Take 2 Caps by mouth two (2) times a day. Indications: NEUROPATHIC PAIN  
 insulin lispro (HUMALOG) 100 unit/mL injection See sliding scale (Patient taking differently: 4 Units Before breakfast, lunch, and dinner. See sliding scale)  folic acid (FOLVITE) 1 mg tablet Take 1 Tab by mouth daily.  insulin glargine (LANTUS) 100 unit/mL injection 5 Units by SubCUTAneous route nightly. Indications: type 2 diabetes mellitus (Patient taking differently: 20 Units by SubCUTAneous route nightly.)  famotidine (PEPCID) 20 mg tablet Take 1 Tab by mouth nightly. (Patient taking differently: Take 20 mg by mouth daily as needed.) No current facility-administered medications for this visit. Allergies Allergen Reactions  Vancomycin Itching  Ampicillin Itching  Bactrim [Sulfamethoxazole-Trimethoprim] Unknown (comments)  Blueberry Swelling Causes throat swelling  Ciprofloxacin Itching  Codeine Other (comments) Jumpy feeling  Crestor [Rosuvastatin] Itching  Darvocet A500 [Propoxyphene N-Acetaminophen] Itching  Demerol [Meperidine] Itching  Levaquin [Levofloxacin] Itching  Lipitor [Atorvastatin] Myalgia  Magnesium Oxide Itching  
  nausea  Minocin [Minocycline] Unknown (comments)  Pcn [Penicillins] Itching  Pravachol [Pravastatin] Swelling Swelling in mouth. Not allergic per patient, takes at home  Shellfish Derived Unknown (comments)  Sulfa (Sulfonamide Antibiotics) Itching  Ultracet [Tramadol-Acetaminophen] Itching  Vicodin [Hydrocodone-Acetaminophen] Unknown (comments)  Vytorin 10-10 [Ezetimibe-Simvastatin] Myalgia  Percodan [Oxycodone Hcl-Oxycodone-Asa] Itching Social  
Social History Tobacco Use  Smoking status: Never Smoker  Smokeless tobacco: Never Used Substance Use Topics  Alcohol use: No  
 
   
Family history: family history includes Cancer in her father. Review of Systems:    
Constitutional: Positive for weight loss. Negative for chills, fever and malaise/fatigue. Respiratory: Negative. Cardiovascular: Positive for orthopnea and leg swelling. Negative for chest pain and palpitations. Gastrointestinal: Positive for diarrhea. Negative for abdominal pain, blood in stool, constipation, heartburn, melena, nausea and vomiting. Musculoskeletal: Positive for joint pain. Negative for falls and myalgias. Neurological: Negative for dizziness. Physical Exam:  
The patient is an alert, oriented, well developed, well nourished 76 y.o.  female who was in no acute distress at the time of my examination. Visit Vitals /76 Pulse 81 Ht 5' 7\" (1.702 m) Wt 108.4 kg (239 lb) BMI 37.43 kg/m² BP Readings from Last 3 Encounters:  
11/14/18 132/76  
11/06/18 124/64  
10/26/18 132/70 Wt Readings from Last 3 Encounters:  
11/14/18 108.4 kg (239 lb) 10/26/18 111.6 kg (246 lb) 10/17/18 111.6 kg (246 lb) HEENT: Conjunctivae pink, sclera clear and white. Neck: Supple without masses or tenderness. There is mild thyromegaly. No clear jugular venous distention. Carotid upstrokes are full bilaterally, without bruits. Cardiovascular: Chest is symmetrical with good excursion. There  keloid over the incision in left upper chest in former pacemaker site. Ferriday is displaced between the MCL and AAL. No lifts, heaves, or thrills felt on palpation. Normal S1 and S2 with a paradoxical splitting of the S2, with a grade II/VI DEE along the left sternal border, with radiation to the base without diastolic murmur. Lungs: Bibasilar rales Abdomen: Protuberant and soft non tender. It was not adequately evaluated since the patient was in a wheelchair at the time of evaluation. Extremities: Trace to 1 + edema with palpible peripheral pulses on the left and right foot bandaged to upper calf. Sammie Harper Neurologic exam: was not completed but the patient is a paraplegic. Review of Data: Please refer to past medical history for most recent cardiac testing. Results for orders placed or performed in visit on 07/19/18 AMB POC EKG ROUTINE W/ 12 LEADS, INTER & REP     Status: None Narrative Read by Jaden Thomas, DO - AV sequentially paced rhythm with one PVC and some sinus beats with atrial sensing and ventricular pacing. Jaden Thomas D.O., F.A.C.C. [facility] [place] [address] [address] [facility] [address] [place] [phone] PLEASE NOTE:  This document has been produced using voice recognition software. Unrecognized errors in transcription may be present. See MAR for last dose taken

## 2024-05-04 LAB
BUN SERPL-MCNC: 53 MG/DL (ref 8–27)
BUN/CREAT SERPL: 25 (ref 12–28)
CALCIUM SERPL-MCNC: 9.7 MG/DL (ref 8.7–10.3)
CHLORIDE SERPL-SCNC: 98 MMOL/L (ref 96–106)
CO2 SERPL-SCNC: 24 MMOL/L (ref 20–29)
CREAT SERPL-MCNC: 2.1 MG/DL (ref 0.57–1)
EGFRCR SERPLBLD CKD-EPI 2021: 24 ML/MIN/1.73
GLUCOSE SERPL-MCNC: 61 MG/DL (ref 70–99)
NT-PROBNP SERPL-MCNC: 1290 PG/ML (ref 0–301)
POTASSIUM SERPL-SCNC: 5.5 MMOL/L (ref 3.5–5.2)
SODIUM SERPL-SCNC: 138 MMOL/L (ref 134–144)
SPECIMEN STATUS REPORT: NORMAL

## 2024-05-09 ENCOUNTER — OFFICE VISIT (OUTPATIENT)
Age: 74
End: 2024-05-09
Payer: MEDICARE

## 2024-05-09 ENCOUNTER — NURSE ONLY (OUTPATIENT)
Age: 74
End: 2024-05-09

## 2024-05-09 VITALS
BODY MASS INDEX: 36.81 KG/M2 | HEIGHT: 67 IN | SYSTOLIC BLOOD PRESSURE: 118 MMHG | OXYGEN SATURATION: 100 % | DIASTOLIC BLOOD PRESSURE: 60 MMHG | HEART RATE: 69 BPM

## 2024-05-09 DIAGNOSIS — E78.5 HYPERLIPIDEMIA, UNSPECIFIED HYPERLIPIDEMIA TYPE: ICD-10-CM

## 2024-05-09 DIAGNOSIS — I42.8 NONISCHEMIC CARDIOMYOPATHY (HCC): ICD-10-CM

## 2024-05-09 DIAGNOSIS — I10 ESSENTIAL (PRIMARY) HYPERTENSION: ICD-10-CM

## 2024-05-09 DIAGNOSIS — I50.23 ACUTE ON CHRONIC SYSTOLIC CONGESTIVE HEART FAILURE (HCC): ICD-10-CM

## 2024-05-09 DIAGNOSIS — I50.22 CHRONIC SYSTOLIC (CONGESTIVE) HEART FAILURE (HCC): Primary | ICD-10-CM

## 2024-05-09 DIAGNOSIS — R06.02 SHORTNESS OF BREATH: ICD-10-CM

## 2024-05-09 DIAGNOSIS — I44.7 LEFT BUNDLE-BRANCH BLOCK, UNSPECIFIED: ICD-10-CM

## 2024-05-09 DIAGNOSIS — Z95.0 BIVENTRICULAR CARDIAC PACEMAKER IN SITU: Primary | ICD-10-CM

## 2024-05-09 DIAGNOSIS — I11.0 HYPERTENSIVE HEART DISEASE WITH HEART FAILURE (HCC): ICD-10-CM

## 2024-05-09 DIAGNOSIS — Z95.0 BIVENTRICULAR CARDIAC PACEMAKER IN SITU: ICD-10-CM

## 2024-05-09 PROCEDURE — 3074F SYST BP LT 130 MM HG: CPT | Performed by: INTERNAL MEDICINE

## 2024-05-09 PROCEDURE — 1123F ACP DISCUSS/DSCN MKR DOCD: CPT | Performed by: INTERNAL MEDICINE

## 2024-05-09 PROCEDURE — 3078F DIAST BP <80 MM HG: CPT | Performed by: INTERNAL MEDICINE

## 2024-05-09 PROCEDURE — 99214 OFFICE O/P EST MOD 30 MIN: CPT | Performed by: INTERNAL MEDICINE

## 2024-05-09 ASSESSMENT — PATIENT HEALTH QUESTIONNAIRE - PHQ9
SUM OF ALL RESPONSES TO PHQ QUESTIONS 1-9: 0
SUM OF ALL RESPONSES TO PHQ QUESTIONS 1-9: 0
SUM OF ALL RESPONSES TO PHQ9 QUESTIONS 1 & 2: 0
SUM OF ALL RESPONSES TO PHQ QUESTIONS 1-9: 0
1. LITTLE INTEREST OR PLEASURE IN DOING THINGS: NOT AT ALL
2. FEELING DOWN, DEPRESSED OR HOPELESS: NOT AT ALL
SUM OF ALL RESPONSES TO PHQ QUESTIONS 1-9: 0

## 2024-05-09 NOTE — PROGRESS NOTES
Meggan Rossi presents today for   Chief Complaint   Patient presents with    Follow-up     6 months    Shortness of Breath    Edema     Left leg       Meggan Rossi preferred language for health care discussion is english/other.    Is someone accompanying this pt? no    Is the patient using any DME equipment during OV? no    Depression Screening:  Depression: Not at risk (5/9/2024)    PHQ-2     PHQ-2 Score: 0        Learning Assessment:  Who is the primary learner? Patient    What is the preferred language for health care of the primary learner? ENGLISH    How does the primary learner prefer to learn new concepts? DEMONSTRATION    Answered By patient    Relationship to Learner SELF           Pt currently taking Anticoagulant therapy? no    Pt currently taking Antiplatelet therapy ? no      Coordination of Care:  1. Have you been to the ER, urgent care clinic since your last visit? Hospitalized since your last visit? no    2. Have you seen or consulted any other health care providers outside of the [facility] since your last visit? Include any pap smears or colon screening. no

## 2024-05-09 NOTE — PROGRESS NOTES
Meggan Rossi    Chief Complaint   Patient presents with    Follow-up     6 months    Shortness of Breath    Edema     Left leg       HPI    Meggan Rossi is a 73 y.o. -American female with history of a dilated cardiomyopathy with mild left ventricular dysfunction and an ejection fraction in the 40 to 45% range with widely patent coronary arteries and a cardiac catheterization back in August 1996 who followed with Dr. Barger for years. An echocardiogram completed on May 5, 2017 suggested an ejection fraction of 50%.  She did have a biventricular AICD placed but due to trauma and infection in that area she had that removed and now she has a biventricular pacemaker with pulse generator residing in her left upper quadrant placed back in October 2012.     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     She had her pacemaker generator changed by Dr. Jang back on February 26, 2019.  It should be noted that she had an infected right foot and was on antibiotics for for some time before the generator change and she is now off antibiotics and foot is now healing by secondary intention and apparently is doing well according to the patient.      She has had some problems with chronic heart failure for which we have tried to adjust her diuretics. Her last echocardiogram was completed on August 17, 2020 demonstrated a left ventricular ejection fraction of 25 to 30% which was slightly improved from past studies and suggested some benefit from her ongoing Entresto therapy. She does have stage 3-4 chronic kidney disease which makes more aggressive diuresis a little challenging.    She is complaining of more swelling LLE>>RLE, she elevates her legs and keeps her head propped to sleep so her bumex was increasead. She is feeling better but not yet to her baseline. She's also on abx

## 2024-05-30 ENCOUNTER — NURSE ONLY (OUTPATIENT)
Age: 74
End: 2024-05-30

## 2024-05-30 ENCOUNTER — PREP FOR PROCEDURE (OUTPATIENT)
Age: 74
End: 2024-05-30

## 2024-05-30 DIAGNOSIS — Z13.9 SCREENING DUE: ICD-10-CM

## 2024-05-30 DIAGNOSIS — Z45.010 ENCOUNTER FOR PACEMAKER AT END OF BATTERY LIFE: Primary | ICD-10-CM

## 2024-05-30 DIAGNOSIS — Z95.810 BIVENTRICULAR IMPLANTABLE CARDIOVERTER-DEFIBRILLATOR IN SITU: Primary | ICD-10-CM

## 2024-05-30 DIAGNOSIS — Z13.0 SCREENING FOR DEFICIENCY ANEMIA: ICD-10-CM

## 2024-05-30 RX ORDER — SODIUM CHLORIDE 0.9 % (FLUSH) 0.9 %
5-40 SYRINGE (ML) INJECTION PRN
OUTPATIENT
Start: 2024-05-30

## 2024-05-30 RX ORDER — SODIUM CHLORIDE 9 MG/ML
INJECTION, SOLUTION INTRAVENOUS PRN
OUTPATIENT
Start: 2024-05-30

## 2024-05-30 RX ORDER — SODIUM CHLORIDE 0.9 % (FLUSH) 0.9 %
5-40 SYRINGE (ML) INJECTION EVERY 12 HOURS SCHEDULED
OUTPATIENT
Start: 2024-05-30

## 2024-05-30 NOTE — PATIENT INSTRUCTIONS
[facility]   Patient  EP Instructions  [address] [place] [address]                You are scheduled to have a pacemaker battery change on  June 19, 2024, at 10:30 am. Please check in at 9:15 am.     Please go to [facility] and park in the outpatient parking lot that is located around to the back of the hospital and enter through the [facility] [place].   Once you enter through the [facility] check in with the  there.  The  will either give you directions or assist you in getting to the cath holding area.           3.  You are not to eat or drink anything after midnight the night before your procedure.      Please continue to take your medications with a small sip of water on the morning of the procedure.     If you are diabetic, do not take your insulin/sugar pill the morning of the procedure.     We encourage families to wait in the waiting room on the first floor while the procedure is being done.  The Doctor will come out and talk with you as soon as the procedure is over. You will not be able to drive yourself home after your procedure is done.      There is the possibility that you may spend the night in the hospital, depending on the results of the procedure.  This will be determined after the procedure is done.      8.   If you or your family have any questions, please call our office Monday-Friday 8:30 am - 4:30 pm , at [phone], and ask to speak to one of the nurses.

## 2024-06-26 ENCOUNTER — HOME HEALTH ADMISSION (OUTPATIENT)
Age: 74
End: 2024-06-26

## 2024-07-08 RX ORDER — BUMETANIDE 2 MG/1
1 TABLET ORAL DAILY
Qty: 45 TABLET | Refills: 3 | Status: ON HOLD | OUTPATIENT
Start: 2024-07-08

## 2024-07-09 ENCOUNTER — HOSPITAL ENCOUNTER (EMERGENCY)
Facility: HOSPITAL | Age: 74
Discharge: HOME | DRG: 258 | End: 2024-07-10
Attending: EMERGENCY MEDICINE
Payer: MEDICARE

## 2024-07-09 ENCOUNTER — APPOINTMENT (OUTPATIENT)
Facility: HOSPITAL | Age: 74
DRG: 258 | End: 2024-07-09
Payer: MEDICARE

## 2024-07-09 DIAGNOSIS — N18.9 CHRONIC RENAL FAILURE, UNSPECIFIED CKD STAGE: ICD-10-CM

## 2024-07-09 DIAGNOSIS — R73.9 HYPERGLYCEMIA: ICD-10-CM

## 2024-07-09 DIAGNOSIS — I50.9 ACUTE ON CHRONIC CONGESTIVE HEART FAILURE, UNSPECIFIED HEART FAILURE TYPE (HCC): Primary | ICD-10-CM

## 2024-07-09 DIAGNOSIS — D64.9 ANEMIA, UNSPECIFIED TYPE: ICD-10-CM

## 2024-07-09 DIAGNOSIS — R07.9 CHEST PAIN, UNSPECIFIED TYPE: ICD-10-CM

## 2024-07-09 LAB
ALBUMIN SERPL-MCNC: 2.8 G/DL (ref 3.4–5)
ALBUMIN/GLOB SERPL: 0.7 (ref 0.8–1.7)
ALP SERPL-CCNC: 120 U/L (ref 45–117)
ALT SERPL-CCNC: 28 U/L (ref 13–56)
ANION GAP SERPL CALC-SCNC: 6 MMOL/L (ref 3–18)
AST SERPL-CCNC: 21 U/L (ref 10–38)
BASOPHILS # BLD: 0.1 K/UL (ref 0–0.1)
BASOPHILS NFR BLD: 1 % (ref 0–2)
BILIRUB SERPL-MCNC: 0.6 MG/DL (ref 0.2–1)
BUN SERPL-MCNC: 64 MG/DL (ref 7–18)
BUN/CREAT SERPL: 28 (ref 12–20)
CALCIUM SERPL-MCNC: 8.9 MG/DL (ref 8.5–10.1)
CHLORIDE SERPL-SCNC: 103 MMOL/L (ref 100–111)
CO2 SERPL-SCNC: 27 MMOL/L (ref 21–32)
CREAT SERPL-MCNC: 2.26 MG/DL (ref 0.6–1.3)
D DIMER PPP FEU-MCNC: 0.5 UG/ML(FEU)
DIFFERENTIAL METHOD BLD: ABNORMAL
EOSINOPHIL # BLD: 0.4 K/UL (ref 0–0.4)
EOSINOPHIL NFR BLD: 6 % (ref 0–5)
ERYTHROCYTE [DISTWIDTH] IN BLOOD BY AUTOMATED COUNT: 16.6 % (ref 11.6–14.5)
GLOBULIN SER CALC-MCNC: 4.3 G/DL (ref 2–4)
GLUCOSE SERPL-MCNC: 326 MG/DL (ref 74–99)
HCT VFR BLD AUTO: 24.9 % (ref 35–45)
HGB BLD-MCNC: 8.4 G/DL (ref 12–16)
IMM GRANULOCYTES # BLD AUTO: 0 K/UL (ref 0–0.04)
IMM GRANULOCYTES NFR BLD AUTO: 0 % (ref 0–0.5)
LYMPHOCYTES # BLD: 1.8 K/UL (ref 0.9–3.6)
LYMPHOCYTES NFR BLD: 28 % (ref 21–52)
MCH RBC QN AUTO: 26.5 PG (ref 24–34)
MCHC RBC AUTO-ENTMCNC: 33.7 G/DL (ref 31–37)
MCV RBC AUTO: 78.5 FL (ref 78–100)
MONOCYTES # BLD: 0.6 K/UL (ref 0.05–1.2)
MONOCYTES NFR BLD: 9 % (ref 3–10)
NEUTS SEG # BLD: 3.6 K/UL (ref 1.8–8)
NEUTS SEG NFR BLD: 56 % (ref 40–73)
NRBC # BLD: 0 K/UL (ref 0–0.01)
NRBC BLD-RTO: 0 PER 100 WBC
NT PRO BNP: 1330 PG/ML (ref 0–900)
PLATELET # BLD AUTO: 246 K/UL (ref 135–420)
PMV BLD AUTO: 9.3 FL (ref 9.2–11.8)
POTASSIUM SERPL-SCNC: 3.9 MMOL/L (ref 3.5–5.5)
PROT SERPL-MCNC: 7.1 G/DL (ref 6.4–8.2)
RBC # BLD AUTO: 3.17 M/UL (ref 4.2–5.3)
SODIUM SERPL-SCNC: 136 MMOL/L (ref 136–145)
TROPONIN I SERPL HS-MCNC: 16 NG/L (ref 0–54)
WBC # BLD AUTO: 6.5 K/UL (ref 4.6–13.2)

## 2024-07-09 PROCEDURE — 93005 ELECTROCARDIOGRAM TRACING: CPT | Performed by: EMERGENCY MEDICINE

## 2024-07-09 PROCEDURE — 6360000002 HC RX W HCPCS: Performed by: EMERGENCY MEDICINE

## 2024-07-09 PROCEDURE — 83880 ASSAY OF NATRIURETIC PEPTIDE: CPT

## 2024-07-09 PROCEDURE — 80053 COMPREHEN METABOLIC PANEL: CPT

## 2024-07-09 PROCEDURE — 71045 X-RAY EXAM CHEST 1 VIEW: CPT

## 2024-07-09 PROCEDURE — 84484 ASSAY OF TROPONIN QUANT: CPT

## 2024-07-09 PROCEDURE — 85025 COMPLETE CBC W/AUTO DIFF WBC: CPT

## 2024-07-09 PROCEDURE — 85379 FIBRIN DEGRADATION QUANT: CPT

## 2024-07-09 RX ORDER — IPRATROPIUM BROMIDE AND ALBUTEROL SULFATE 2.5; .5 MG/3ML; MG/3ML
1 SOLUTION RESPIRATORY (INHALATION)
Status: COMPLETED | OUTPATIENT
Start: 2024-07-09 | End: 2024-07-10

## 2024-07-09 RX ORDER — FUROSEMIDE 10 MG/ML
20 INJECTION INTRAMUSCULAR; INTRAVENOUS
Status: COMPLETED | OUTPATIENT
Start: 2024-07-09 | End: 2024-07-09

## 2024-07-09 RX ORDER — FUROSEMIDE 10 MG/ML
20 INJECTION INTRAMUSCULAR; INTRAVENOUS
Status: COMPLETED | OUTPATIENT
Start: 2024-07-09 | End: 2024-07-10

## 2024-07-09 RX ORDER — INSULIN GLARGINE 100 [IU]/ML
20 INJECTION, SOLUTION SUBCUTANEOUS DAILY
Status: ON HOLD | COMMUNITY
Start: 2024-06-25

## 2024-07-09 RX ADMIN — FUROSEMIDE 20 MG: 10 INJECTION, SOLUTION INTRAMUSCULAR; INTRAVENOUS at 21:54

## 2024-07-09 ASSESSMENT — LIFESTYLE VARIABLES
HOW MANY STANDARD DRINKS CONTAINING ALCOHOL DO YOU HAVE ON A TYPICAL DAY: PATIENT DOES NOT DRINK
HOW OFTEN DO YOU HAVE A DRINK CONTAINING ALCOHOL: NEVER

## 2024-07-09 ASSESSMENT — PAIN SCALES - GENERAL: PAINLEVEL_OUTOF10: 6

## 2024-07-09 ASSESSMENT — PAIN - FUNCTIONAL ASSESSMENT: PAIN_FUNCTIONAL_ASSESSMENT: 0-10

## 2024-07-09 ASSESSMENT — PAIN DESCRIPTION - LOCATION: LOCATION: CHEST

## 2024-07-10 ENCOUNTER — HOSPITAL ENCOUNTER (INPATIENT)
Facility: HOSPITAL | Age: 74
LOS: 9 days | Discharge: NURSING, REHAB OR LONG-TERM CARE | DRG: 258 | End: 2024-07-19
Attending: STUDENT IN AN ORGANIZED HEALTH CARE EDUCATION/TRAINING PROGRAM | Admitting: INTERNAL MEDICINE
Payer: MEDICARE

## 2024-07-10 VITALS
OXYGEN SATURATION: 99 % | HEART RATE: 71 BPM | BODY MASS INDEX: 37.35 KG/M2 | RESPIRATION RATE: 19 BRPM | WEIGHT: 238 LBS | TEMPERATURE: 98.1 F | SYSTOLIC BLOOD PRESSURE: 133 MMHG | DIASTOLIC BLOOD PRESSURE: 48 MMHG | HEIGHT: 67 IN

## 2024-07-10 DIAGNOSIS — R60.0 EDEMA OF LEFT LOWER EXTREMITY: ICD-10-CM

## 2024-07-10 DIAGNOSIS — R07.9 CHEST PAIN, UNSPECIFIED TYPE: ICD-10-CM

## 2024-07-10 DIAGNOSIS — Z45.010 PACEMAKER GENERATOR END OF LIFE: ICD-10-CM

## 2024-07-10 DIAGNOSIS — I42.9 CARDIOMYOPATHY, UNSPECIFIED TYPE (HCC): ICD-10-CM

## 2024-07-10 DIAGNOSIS — I50.9 ACUTE ON CHRONIC CONGESTIVE HEART FAILURE, UNSPECIFIED HEART FAILURE TYPE (HCC): Primary | ICD-10-CM

## 2024-07-10 DIAGNOSIS — Z45.010 ENCOUNTER FOR PACEMAKER AT END OF BATTERY LIFE: ICD-10-CM

## 2024-07-10 LAB
ANION GAP SERPL CALC-SCNC: 10 MMOL/L (ref 3–18)
BASOPHILS # BLD: 0.1 K/UL (ref 0–0.1)
BASOPHILS NFR BLD: 1 % (ref 0–2)
BUN SERPL-MCNC: 64 MG/DL (ref 7–18)
BUN/CREAT SERPL: 28 (ref 12–20)
CALCIUM SERPL-MCNC: 8.9 MG/DL (ref 8.5–10.1)
CHLORIDE SERPL-SCNC: 103 MMOL/L (ref 100–111)
CO2 SERPL-SCNC: 23 MMOL/L (ref 21–32)
CREAT SERPL-MCNC: 2.32 MG/DL (ref 0.6–1.3)
DIFFERENTIAL METHOD BLD: ABNORMAL
EKG ATRIAL RATE: 74 BPM
EKG DIAGNOSIS: NORMAL
EKG DIAGNOSIS: NORMAL
EKG P AXIS: 86 DEGREES
EKG P-R INTERVAL: 126 MS
EKG Q-T INTERVAL: 476 MS
EKG Q-T INTERVAL: 480 MS
EKG QRS DURATION: 168 MS
EKG QRS DURATION: 170 MS
EKG QTC CALCULATION (BAZETT): 525 MS
EKG QTC CALCULATION (BAZETT): 528 MS
EKG R AXIS: 95 DEGREES
EKG R AXIS: 95 DEGREES
EKG T AXIS: 149 DEGREES
EKG T AXIS: 92 DEGREES
EKG VENTRICULAR RATE: 72 BPM
EKG VENTRICULAR RATE: 74 BPM
EOSINOPHIL # BLD: 0.3 K/UL (ref 0–0.4)
EOSINOPHIL NFR BLD: 4 % (ref 0–5)
ERYTHROCYTE [DISTWIDTH] IN BLOOD BY AUTOMATED COUNT: 16.7 % (ref 11.6–14.5)
GLUCOSE BLD STRIP.AUTO-MCNC: 234 MG/DL (ref 70–110)
GLUCOSE BLD STRIP.AUTO-MCNC: 406 MG/DL (ref 70–110)
GLUCOSE BLD STRIP.AUTO-MCNC: 409 MG/DL (ref 70–110)
GLUCOSE SERPL-MCNC: 312 MG/DL (ref 74–99)
HCT VFR BLD AUTO: 25.4 % (ref 35–45)
HGB BLD-MCNC: 8.6 G/DL (ref 12–16)
IMM GRANULOCYTES # BLD AUTO: 0 K/UL (ref 0–0.04)
IMM GRANULOCYTES NFR BLD AUTO: 0 % (ref 0–0.5)
LYMPHOCYTES # BLD: 1.4 K/UL (ref 0.9–3.6)
LYMPHOCYTES NFR BLD: 16 % (ref 21–52)
MCH RBC QN AUTO: 26.5 PG (ref 24–34)
MCHC RBC AUTO-ENTMCNC: 33.9 G/DL (ref 31–37)
MCV RBC AUTO: 78.2 FL (ref 78–100)
MONOCYTES # BLD: 0.5 K/UL (ref 0.05–1.2)
MONOCYTES NFR BLD: 6 % (ref 3–10)
NEUTS SEG # BLD: 6.4 K/UL (ref 1.8–8)
NEUTS SEG NFR BLD: 73 % (ref 40–73)
NRBC # BLD: 0 K/UL (ref 0–0.01)
NRBC BLD-RTO: 0 PER 100 WBC
PLATELET # BLD AUTO: 254 K/UL (ref 135–420)
PMV BLD AUTO: 9.8 FL (ref 9.2–11.8)
POTASSIUM SERPL-SCNC: 5.5 MMOL/L (ref 3.5–5.5)
RBC # BLD AUTO: 3.25 M/UL (ref 4.2–5.3)
SODIUM SERPL-SCNC: 136 MMOL/L (ref 136–145)
TROPONIN I SERPL HS-MCNC: 18 NG/L (ref 0–54)
TROPONIN I SERPL HS-MCNC: 18 NG/L (ref 0–54)
WBC # BLD AUTO: 8.8 K/UL (ref 4.6–13.2)

## 2024-07-10 PROCEDURE — 99223 1ST HOSP IP/OBS HIGH 75: CPT | Performed by: HOSPITALIST

## 2024-07-10 PROCEDURE — 2580000003 HC RX 258: Performed by: STUDENT IN AN ORGANIZED HEALTH CARE EDUCATION/TRAINING PROGRAM

## 2024-07-10 PROCEDURE — 6360000002 HC RX W HCPCS: Performed by: STUDENT IN AN ORGANIZED HEALTH CARE EDUCATION/TRAINING PROGRAM

## 2024-07-10 PROCEDURE — 93010 ELECTROCARDIOGRAM REPORT: CPT | Performed by: INTERNAL MEDICINE

## 2024-07-10 PROCEDURE — 2700000000 HC OXYGEN THERAPY PER DAY

## 2024-07-10 PROCEDURE — 94761 N-INVAS EAR/PLS OXIMETRY MLT: CPT

## 2024-07-10 PROCEDURE — 82962 GLUCOSE BLOOD TEST: CPT

## 2024-07-10 PROCEDURE — 93005 ELECTROCARDIOGRAM TRACING: CPT | Performed by: STUDENT IN AN ORGANIZED HEALTH CARE EDUCATION/TRAINING PROGRAM

## 2024-07-10 PROCEDURE — 6370000000 HC RX 637 (ALT 250 FOR IP): Performed by: HOSPITALIST

## 2024-07-10 PROCEDURE — 2580000003 HC RX 258: Performed by: HOSPITALIST

## 2024-07-10 PROCEDURE — 6360000002 HC RX W HCPCS: Performed by: EMERGENCY MEDICINE

## 2024-07-10 PROCEDURE — 6360000002 HC RX W HCPCS: Performed by: HOSPITALIST

## 2024-07-10 PROCEDURE — 85025 COMPLETE CBC W/AUTO DIFF WBC: CPT

## 2024-07-10 PROCEDURE — 99285 EMERGENCY DEPT VISIT HI MDM: CPT

## 2024-07-10 PROCEDURE — 1100000000 HC RM PRIVATE

## 2024-07-10 PROCEDURE — 80048 BASIC METABOLIC PNL TOTAL CA: CPT

## 2024-07-10 PROCEDURE — 84484 ASSAY OF TROPONIN QUANT: CPT

## 2024-07-10 PROCEDURE — 6370000000 HC RX 637 (ALT 250 FOR IP): Performed by: EMERGENCY MEDICINE

## 2024-07-10 RX ORDER — BENZONATATE 200 MG/1
200 CAPSULE ORAL 2 TIMES DAILY PRN
Qty: 20 CAPSULE | Refills: 0 | Status: ON HOLD | OUTPATIENT
Start: 2024-07-10 | End: 2024-07-10

## 2024-07-10 RX ORDER — ATORVASTATIN CALCIUM 80 MG/1
80 TABLET, FILM COATED ORAL DAILY
COMMUNITY

## 2024-07-10 RX ORDER — INSULIN LISPRO 100 [IU]/ML
0-8 INJECTION, SOLUTION INTRAVENOUS; SUBCUTANEOUS
Status: DISCONTINUED | OUTPATIENT
Start: 2024-07-10 | End: 2024-07-19 | Stop reason: HOSPADM

## 2024-07-10 RX ORDER — INSULIN LISPRO 100 [IU]/ML
0-4 INJECTION, SOLUTION INTRAVENOUS; SUBCUTANEOUS NIGHTLY
Status: DISCONTINUED | OUTPATIENT
Start: 2024-07-10 | End: 2024-07-19 | Stop reason: HOSPADM

## 2024-07-10 RX ORDER — MULTIVITAMIN WITH IRON
1 TABLET ORAL DAILY
Status: DISCONTINUED | OUTPATIENT
Start: 2024-07-11 | End: 2024-07-19 | Stop reason: HOSPADM

## 2024-07-10 RX ORDER — SODIUM CHLORIDE 0.9 % (FLUSH) 0.9 %
5-40 SYRINGE (ML) INJECTION EVERY 12 HOURS SCHEDULED
Status: DISCONTINUED | OUTPATIENT
Start: 2024-07-10 | End: 2024-07-19 | Stop reason: HOSPADM

## 2024-07-10 RX ORDER — ATORVASTATIN CALCIUM 40 MG/1
80 TABLET, FILM COATED ORAL DAILY
Status: DISCONTINUED | OUTPATIENT
Start: 2024-07-11 | End: 2024-07-19 | Stop reason: HOSPADM

## 2024-07-10 RX ORDER — FUROSEMIDE 10 MG/ML
20 INJECTION INTRAMUSCULAR; INTRAVENOUS ONCE
Status: COMPLETED | OUTPATIENT
Start: 2024-07-10 | End: 2024-07-10

## 2024-07-10 RX ORDER — SODIUM CHLORIDE 9 MG/ML
INJECTION, SOLUTION INTRAVENOUS PRN
Status: DISCONTINUED | OUTPATIENT
Start: 2024-07-10 | End: 2024-07-19 | Stop reason: HOSPADM

## 2024-07-10 RX ORDER — DEXTROSE MONOHYDRATE 100 MG/ML
INJECTION, SOLUTION INTRAVENOUS CONTINUOUS PRN
Status: DISCONTINUED | OUTPATIENT
Start: 2024-07-10 | End: 2024-07-19 | Stop reason: HOSPADM

## 2024-07-10 RX ORDER — SACUBITRIL AND VALSARTAN 97; 103 MG/1; MG/1
1 TABLET, FILM COATED ORAL 2 TIMES DAILY
Qty: 180 TABLET | Refills: 3 | Status: ON HOLD | OUTPATIENT
Start: 2024-07-10

## 2024-07-10 RX ORDER — FUROSEMIDE 10 MG/ML
20 INJECTION INTRAMUSCULAR; INTRAVENOUS
Status: COMPLETED | OUTPATIENT
Start: 2024-07-10 | End: 2024-07-10

## 2024-07-10 RX ORDER — PEN NEEDLE, DIABETIC 31 GX3/16"
1 NEEDLE, DISPOSABLE MISCELLANEOUS 4 TIMES DAILY
COMMUNITY

## 2024-07-10 RX ORDER — HEPARIN SODIUM 5000 [USP'U]/ML
5000 INJECTION, SOLUTION INTRAVENOUS; SUBCUTANEOUS EVERY 8 HOURS SCHEDULED
Status: DISCONTINUED | OUTPATIENT
Start: 2024-07-10 | End: 2024-07-10

## 2024-07-10 RX ORDER — AZITHROMYCIN 250 MG/1
500 TABLET, FILM COATED ORAL
Status: COMPLETED | OUTPATIENT
Start: 2024-07-10 | End: 2024-07-10

## 2024-07-10 RX ORDER — AZITHROMYCIN 250 MG/1
250 TABLET, FILM COATED ORAL DAILY
Qty: 4 TABLET | Refills: 0 | Status: ON HOLD | OUTPATIENT
Start: 2024-07-10 | End: 2024-07-10 | Stop reason: ALTCHOICE

## 2024-07-10 RX ORDER — POLYETHYLENE GLYCOL 3350 17 G/17G
17 POWDER, FOR SOLUTION ORAL DAILY PRN
Status: DISCONTINUED | OUTPATIENT
Start: 2024-07-10 | End: 2024-07-19 | Stop reason: HOSPADM

## 2024-07-10 RX ORDER — ONDANSETRON 2 MG/ML
4 INJECTION INTRAMUSCULAR; INTRAVENOUS EVERY 6 HOURS PRN
Status: DISCONTINUED | OUTPATIENT
Start: 2024-07-10 | End: 2024-07-19 | Stop reason: HOSPADM

## 2024-07-10 RX ORDER — 0.9 % SODIUM CHLORIDE 0.9 %
250 INTRAVENOUS SOLUTION INTRAVENOUS ONCE
Status: COMPLETED | OUTPATIENT
Start: 2024-07-10 | End: 2024-07-10

## 2024-07-10 RX ORDER — INSULIN GLARGINE 100 [IU]/ML
20 INJECTION, SOLUTION SUBCUTANEOUS
Status: DISCONTINUED | OUTPATIENT
Start: 2024-07-10 | End: 2024-07-11

## 2024-07-10 RX ORDER — SODIUM CHLORIDE 0.9 % (FLUSH) 0.9 %
5-40 SYRINGE (ML) INJECTION PRN
Status: DISCONTINUED | OUTPATIENT
Start: 2024-07-10 | End: 2024-07-19 | Stop reason: HOSPADM

## 2024-07-10 RX ORDER — ONDANSETRON 4 MG/1
4 TABLET, ORALLY DISINTEGRATING ORAL EVERY 8 HOURS PRN
Status: DISCONTINUED | OUTPATIENT
Start: 2024-07-10 | End: 2024-07-19 | Stop reason: HOSPADM

## 2024-07-10 RX ORDER — METHENAMINE HIPPURATE 1000 MG/1
1 TABLET ORAL 2 TIMES DAILY WITH MEALS
Status: DISCONTINUED | OUTPATIENT
Start: 2024-07-10 | End: 2024-07-19 | Stop reason: HOSPADM

## 2024-07-10 RX ORDER — HYDROXYZINE HYDROCHLORIDE 25 MG/1
25 TABLET, FILM COATED ORAL EVERY 6 HOURS PRN
Status: DISCONTINUED | OUTPATIENT
Start: 2024-07-10 | End: 2024-07-19 | Stop reason: HOSPADM

## 2024-07-10 RX ORDER — CARVEDILOL 6.25 MG/1
6.25 TABLET ORAL 2 TIMES DAILY WITH MEALS
Status: DISCONTINUED | OUTPATIENT
Start: 2024-07-10 | End: 2024-07-19 | Stop reason: HOSPADM

## 2024-07-10 RX ADMIN — APIXABAN 5 MG: 5 TABLET, FILM COATED ORAL at 20:11

## 2024-07-10 RX ADMIN — FUROSEMIDE 20 MG: 10 INJECTION, SOLUTION INTRAMUSCULAR; INTRAVENOUS at 00:05

## 2024-07-10 RX ADMIN — SODIUM CHLORIDE, PRESERVATIVE FREE 10 ML: 5 INJECTION INTRAVENOUS at 20:11

## 2024-07-10 RX ADMIN — AZITHROMYCIN DIHYDRATE 500 MG: 250 TABLET ORAL at 01:13

## 2024-07-10 RX ADMIN — BUMETANIDE 1 MG/HR: 0.25 INJECTION INTRAMUSCULAR; INTRAVENOUS at 19:52

## 2024-07-10 RX ADMIN — SODIUM CHLORIDE 250 ML: 9 INJECTION, SOLUTION INTRAVENOUS at 10:40

## 2024-07-10 RX ADMIN — IPRATROPIUM BROMIDE AND ALBUTEROL SULFATE 1 DOSE: .5; 3 SOLUTION RESPIRATORY (INHALATION) at 00:05

## 2024-07-10 RX ADMIN — CARVEDILOL 6.25 MG: 6.25 TABLET, FILM COATED ORAL at 16:59

## 2024-07-10 RX ADMIN — INSULIN GLARGINE 20 UNITS: 100 INJECTION, SOLUTION SUBCUTANEOUS at 16:59

## 2024-07-10 RX ADMIN — INSULIN LISPRO 4 UNITS: 100 INJECTION, SOLUTION INTRAVENOUS; SUBCUTANEOUS at 21:12

## 2024-07-10 RX ADMIN — FUROSEMIDE 20 MG: 10 INJECTION, SOLUTION INTRAMUSCULAR; INTRAVENOUS at 15:50

## 2024-07-10 RX ADMIN — FUROSEMIDE 20 MG: 10 INJECTION, SOLUTION INTRAMUSCULAR; INTRAVENOUS at 09:28

## 2024-07-10 ASSESSMENT — PAIN SCALES - GENERAL
PAINLEVEL_OUTOF10: 0
PAINLEVEL_OUTOF10: 7
PAINLEVEL_OUTOF10: 0

## 2024-07-10 ASSESSMENT — PAIN DESCRIPTION - LOCATION: LOCATION: ABDOMEN

## 2024-07-10 ASSESSMENT — PAIN DESCRIPTION - ORIENTATION: ORIENTATION: RIGHT

## 2024-07-10 ASSESSMENT — PAIN - FUNCTIONAL ASSESSMENT: PAIN_FUNCTIONAL_ASSESSMENT: 0-10

## 2024-07-10 NOTE — ED PROVIDER NOTES
[facility] EMERGENCY DEPT  EMERGENCY DEPARTMENT ENCOUNTER      Pt Name: Meggan Rossi  MRN: [id number]  Birthdate 1950  Date of evaluation: 7/9/2024  Provider: Jimmy Devine MD    CHIEF COMPLAINT       Chief Complaint   Patient presents with    Cough    Shortness of Breath       HPI:  Meggan Rossi is a 73 y.o. female who presents to the emergency department pt c/o cough/sob, intermittent cp x 3 days, worse w lyling flat.  H/o same w chf.  Using alb mdi, not helping  No abd pain. No leg pain  H/o spinal cord infection, uses wheelchair, has cystomy tube       HPI    Nursing Notes were reviewed.    REVIEW OF SYSTEMS    (2-9 systems for level 4, 10 or more for level 5)     Review of Systems    Except as noted above the remainder of the review of systems was reviewed and negative.       PAST MEDICAL HISTORY     Past Medical History:   Diagnosis Date    Abnormal nuclear cardiac imaging test 06/19/2015    Fixed distal apical, distal septal defect more likely due to RV pacing than prior infarct.  No ischemia.  EF 46%.  RWMA c/w RV pacing.  Nondiagnostic EKG on pharm stress test.      Acute paraplegia (HCC) 4/20/2017    Benign hypertensive heart disease with systolic CHF, NYHA class 2 ([facility]) 9/5/2012    Biventricular implantable cardioverter-defibrillator in situ 04/28/2005    Upgraded to BiV AICD; gen change 4/2008; pocket revision 10/2009; Abdominal - done on 8/22/2012 by Dr. Ashok Myers     Chronic anemia 9/5/2012    Chronic systolic heart failure (HCC)     Decreased calculated glomerular filtration rate (GFR) 3/30/2017    Calculated GFR equivalent to that of CKD stage 3 = 30-59 ml/min    Diabetic neuropathy associated with type 2 diabetes mellitus ([facility]) 6/28/2011    Difficult airway for intubation 08/22/2012    see anesthesia airway note    DVT (deep venous thrombosis) ([facility]) 08/27/2012    DVT in axillary vein on left.  Left subclavian was not visualized.    DVT (deep venous thrombosis) ([facility]) 09/04/2012    Acute,    bumetanide (BUMEX) 2 MG tablet TAKE 1/2 TABLET BY MOUTH EVERY DAY, Disp-45 tablet, R-3Normal      loratadine (CLARITIN) 10 MG tablet Take 1 tablet by mouth dailyHistorical Med      methenamine (HIPREX) 1 g tablet TAKE 1 TABLET BY MOUTH TWICE A DAY WITH MEALS, Disp-180 tablet, R-0Normal      carvedilol (COREG) 6.25 MG tablet TAKE 1 TABLET BY MOUTH TWICE A DAY, Disp-180 tablet, R-3Normal      folic acid (FOLVITE) 1 MG tablet Take 1 tablet by mouth dailyHistorical Med      HUMALOG KWIKPEN 100 UNIT/ML SOPN DAWHistorical Med      Albuterol Sulfate, sensor, 108 (90 Base) MCG/ACT AEPB Inhale into the lungs as neededHistorical Med      nystatin (NYAMYC) 439337 UNIT/GM powder Apply topically 2 times daily Apply topically 4 times daily., Topical, 2 TIMES DAILY, Historical Med      ketoconazole (NIZORAL) 2 % shampoo Apply topically daily as needed for Itching Apply topically daily as needed., Topical, DAILY PRN, Historical Med      cyclobenzaprine (FLEXERIL) 10 MG tablet Take 1 tablet by mouth 3 times daily as needed for Muscle spasmsHistorical Med      Vitamin D-Vitamin K (D3 + K2 DOTS PO) Take 1 tablet by mouth dailyHistorical Med      sacubitril-valsartan (ENTRESTO)  MG per tablet Take 1 tablet by mouth 2 times daily, Disp-180 tablet, R-3Normal      BYDUREON BCISE 2 MG/0.85ML injection INJECT 1 (ONE) INJECTION UNDER THE SKIN WEEKLY, DAWHistorical Med      Multiple Vitamin (MULTIVITAMIN ADULT PO) Take 1 tablet by mouth dailyHistorical Med      Cranberry (THERACRAN PO) Take by mouth dailyHistorical Med      Cobalamin Combinations (VITAMIN B12-FOLIC ACID PO) Take by mouth daily 2 tabs by mouth daily 1000-400 mcg LozgHistorical Med      metOLazone (ZAROXOLYN) 2.5 MG tablet TAKE 1 TABLET BY MOUTH TWICE WEEKLY, Disp-24 tablet, R-3Normal      hydrOXYzine HCl (ATARAX) 25 MG tablet Take 1 tablet by mouth as neededHistorical Med      gabapentin (NEURONTIN) 300 MG capsule Take 2 capsules by mouth 2 times daily.Historical Med

## 2024-07-10 NOTE — DISCHARGE INSTRUCTIONS
Return for pain, fever not resolving with motrin or tylenol, shortness of breath, vomiting, decreased fluid intake, weakness, numbness, dizziness, or any change or concerns or for admission as discussed/offered now.

## 2024-07-10 NOTE — PROGRESS NOTES
Advance Care Planning   Healthcare Decision Maker:    Today we documented Decision Maker(s) consistent with Legal Next of Kin hierarchy.       Tito Rossi Spouse     Primary Phone: [phone] (M)Home Phone: [phone] Phone: [phone]      Tanisha Jacobs    Primary Phone: [phone] (H)Home Phone: [phone]         conducted an initial consultation and Spiritual Assessment for Meggan Rossi, who is a 73 y.o.,female. Patient's Primary Language is: English.   According to the patient's EMR Buddhist Affiliation is: Mosque.     The reason the Patient came to the hospital is:   Patient Active Problem List    Diagnosis Date Noted    Heart failure (HCC) 07/10/2024    Pressure injury of skin of left heel 06/29/2023    Proteinuria 03/29/2021    Wheelchair dependence 03/05/2020    Neuropathic ulcer of heel, right, with fat layer exposed ([facility]) 03/05/2020    Systolic CHF, acute on chronic ([facility]) 12/13/2019    Elevated troponin 12/12/2019    Dyspnea 12/12/2019    Urinary retention 09/17/2019    Diabetic polyneuropathy associated with type 2 diabetes mellitus ([facility]) 03/26/2019    Non-healing wound of right heel 03/26/2019    Infected wound 12/27/2018    Heel ulcer ([facility]) 10/17/2018    Osteomyelitis ([facility]) 08/16/2018    Foot osteomyelitis, right ([facility]) 08/16/2018    Severe obesity (BMI 35.0-39.9) with comorbidity ([facility]) 05/24/2018    Obesity (BMI 30.0-34.9) 05/04/2018    Type 2 diabetes with nephropathy ([facility]) 03/12/2018    Neurogenic bladder 10/12/2017    Biventricular cardiac pacemaker in situ 09/13/2017    Hypervolemia 06/06/2017    Psoas abscess, right ([facility]) 04/20/2017    Urinary tract infection due to Enterococcus 04/20/2017    Sepsis ([facility]) 04/20/2017    Rivera catheter in place on admission 04/20/2017    Group B streptococcal infection 04/20/2017    Acute paraplegia ([facility]) 04/20/2017    History of Coumadin therapy     Gout     Chronic systolic heart failure (HCC)     Psoas hematoma, right,

## 2024-07-10 NOTE — ED TRIAGE NOTES
A&O female with continued coughing after discharge from ED earlier this AM. Unable to sleep secondary to coughing. Has pain in left side just under rib cage from coughing.

## 2024-07-10 NOTE — ED NOTES
Discussed hypotension with Dr. Barnett.  Patient recently received lasix.  Will continue to monitor BP.  No further orders at this time

## 2024-07-10 NOTE — ED NOTES
TRANSFER - OUT REPORT:    Verbal report given to ELSIE Munguia on Meggan Rossi  being transferred to [facility] 547 for routine progression of patient care       Report consisted of patient's Situation, Background, Assessment and   Recommendations(SBAR).     Information from the following report(s) Nurse Handoff Report was reviewed with the receiving nurse.    Edith Fall Assessment:    Presents to emergency department  because of falls (Syncope, seizure, or loss of consciousness): No  Age > 70: Yes  Altered Mental Status, Intoxication with alcohol or substance confusion (Disorientation, impaired judgment, poor safety awaremess, or inability to follow instructions): No  Impaired Mobility: Ambulates or transfers with assistive devices or assistance; Unable to ambulate or transer.: Yes  Nursing Judgement: Yes          Lines:   Peripheral IV 07/10/24 Left;Posterior Hand (Active)   Site Assessment Clean, dry & intact 07/10/24 0755   Line Status Blood return noted;Specimen collected 07/10/24 0755   Phlebitis Assessment No symptoms 07/10/24 0755   Infiltration Assessment 0 07/10/24 0755        Opportunity for questions and clarification was provided.      Patient transported with:  Monitor  NC  IVF       Report to ELSIE Munguia.  Phone call disconnected before last name obtained.

## 2024-07-10 NOTE — ED PROVIDER NOTES
EMERGENCY DEPARTMENT HISTORY AND PHYSICAL EXAM      Date: 7/10/2024  Patient Name: Meggan Rossi    History of Presenting Illness     Chief Complaint   Patient presents with    Cough       73-year-old female presenting to the emergency department for evaluation of cough and shortness of breath with chest tightness for the past 3 days.  Worse with lying supine.  History of CHF with pacemaker in place.  Patient was seen here for the same last night and was feeling better after receiving some Lasix and was discharged.  However, said she was not able to lay flat and she continues to cough all night prompting her visit back in the emergency department.            PCP: Darci Garcia MD    No current facility-administered medications for this encounter.       Past History     Past Medical History:  Past Medical History:   Diagnosis Date    Abnormal nuclear cardiac imaging test 06/19/2015    Fixed distal apical, distal septal defect more likely due to RV pacing than prior infarct.  No ischemia.  EF 46%.  RWMA c/w RV pacing.  Nondiagnostic EKG on pharm stress test.      Acute paraplegia ([facility]) 4/20/2017    Benign hypertensive heart disease with systolic CHF, NYHA class 2 ([facility]) 9/5/2012    Biventricular implantable cardioverter-defibrillator in situ 04/28/2005    Upgraded to BiV AICD; gen change 4/2008; pocket revision 10/2009; Abdominal - done on 8/22/2012 by Dr. Ashok Myers     Chronic anemia 9/5/2012    Chronic systolic heart failure ([facility])     Decreased calculated glomerular filtration rate (GFR) 3/30/2017    Calculated GFR equivalent to that of CKD stage 3 = 30-59 ml/min    Diabetic neuropathy associated with type 2 diabetes mellitus ([facility]) 6/28/2011    Difficult airway for intubation 08/22/2012    see anesthesia airway note    DVT (deep venous thrombosis) ([facility]) 08/27/2012    DVT in axillary vein on left.  Left subclavian was not visualized.    DVT (deep venous thrombosis) ([facility]) 09/04/2012    Acute, non-occlusive DVT in  21:26,  Electronic atrial pacemaker has replaced Atrial fibrillation  Nonspecific T wave abnormality now evident in Inferior leads     CBC with Auto Differential    Collection Time: 07/10/24  7:55 AM   Result Value Ref Range    WBC 8.8 4.6 - 13.2 K/uL    RBC 3.25 (L) 4.20 - 5.30 M/uL    Hemoglobin 8.6 (L) 12.0 - 16.0 g/dL    Hematocrit 25.4 (L) 35.0 - 45.0 %    MCV 78.2 78.0 - 100.0 FL    MCH 26.5 24.0 - 34.0 PG    MCHC 33.9 31.0 - 37.0 g/dL    RDW 16.7 (H) 11.6 - 14.5 %    Platelets 254 135 - 420 K/uL    MPV 9.8 9.2 - 11.8 FL    Nucleated RBCs 0.0 0  WBC    nRBC 0.00 0.00 - 0.01 K/uL    Neutrophils % 73 40 - 73 %    Lymphocytes % 16 (L) 21 - 52 %    Monocytes % 6 3 - 10 %    Eosinophils % 4 0 - 5 %    Basophils % 1 0 - 2 %    Immature Granulocytes % 0 0.0 - 0.5 %    Neutrophils Absolute 6.4 1.8 - 8.0 K/UL    Lymphocytes Absolute 1.4 0.9 - 3.6 K/UL    Monocytes Absolute 0.5 0.05 - 1.2 K/UL    Eosinophils Absolute 0.3 0.0 - 0.4 K/UL    Basophils Absolute 0.1 0.0 - 0.1 K/UL    Immature Granulocytes Absolute 0.0 0.00 - 0.04 K/UL    Differential Type AUTOMATED     Basic Metabolic Panel    Collection Time: 07/10/24  7:55 AM   Result Value Ref Range    Sodium 136 136 - 145 mmol/L    Potassium 5.5 3.5 - 5.5 mmol/L    Chloride 103 100 - 111 mmol/L    CO2 23 21 - 32 mmol/L    Anion Gap 10 3.0 - 18 mmol/L    Glucose 312 (H) 74 - 99 mg/dL    BUN 64 (H) 7.0 - 18 MG/DL    Creatinine 2.32 (H) 0.6 - 1.3 MG/DL    BUN/Creatinine Ratio 28 (H) 12 - 20      Est, Glom Filt Rate 22 (L) >60 ml/min/1.73m2    Calcium 8.9 8.5 - 10.1 MG/DL   Troponin    Collection Time: 07/10/24  7:55 AM   Result Value Ref Range    Troponin, High Sensitivity 18 0 - 54 ng/L       Radiologic Studies -   Non-plain film images such as CT, Ultrasound and MRI are read by the radiologist. Plain radiographic images are visualized and preliminarily interpreted by the emergency physician.    No orders to display           Medical Decision Making   I am the first

## 2024-07-10 NOTE — PROGRESS NOTES
Patient arrived on unit with daughters at bedside. Patient alert and oriented x4. Endorses pain on left side/rib cage due to persistent cough. Patient is on 2LPM via nasal cannula. Respirations are unlabored and even. MD made aware of patient's arrival to unit. Vitals are currently stable.       1530- Family of patient notified primary RN that patient's respirations are becoming more labored. RN assesses patients RR at 30. Patient is in tears with pain. MD paged and made aware. Family also concerned with patient being on med surg unit and not in the heart center.

## 2024-07-10 NOTE — H&P
History & Physical    Patient: Meggan Rossi MRN: [id number]  CSN: [id number]    YOB: 1950  Age: 73 y.o.  Sex: female      DOA: 7/10/2024    Chief Complaint   Patient presents with    Cough          HPI:     Meggan Rossi is a 73 y.o. female with past medical hx of stroke with residual right-sided deficit and bedbound at baseline, Bell's palsy, chronic systolic CHF, CKD stage III, chronic nonocclusive DVT, complete heart block status post pacemaker, diabetes type 2, chronic suprapubic catheter who presented to the ED with several days of progressively worsening shortness of breath.  Patient has a history of chronic systolic CHF, and CKD 3 and recently changes were made to her home medication regimen.  Patient was admitted to [facility] from 5/30/2024 to 6/6/2024 for acute osteomyelitis of left foot, bilateral heel ulcers.  During that hospital stay, she underwent biopsy of left calcaneal bone which confirmed the diagnosis of the osteomyelitis.  Patient subsequently underwent bilateral heel debridement with left partial calcanectomy 6/4/2024 per Jorge Hernandez DPM, [facility].  Patient was discharged to SNF for subacute rehab, on Vancomycin 750 mg IV Q daily, Pharmacy to adjust, stop date: 7/05/24.  Patient was since discharged back to home, under the care of her daughters who have cared for her for several years.    Patient has since followed up with her primary care physician, and medications were adjusted at that time.  She is also followed up with podiatry, and skin grafts are planned per daughter's description.  Daughters further states that pacemaker could not be changed given infectious issues, and the cardiologist was waiting for clearance from infectious disease.  Per daughters, OSMAN Martinez cleared patient for surgery as of 7/5/2024.  Daughters further states that podiatry has been changing her dressings in their office.    Patient began to experience shortness of  fibrillation  Nonspecific T wave abnormality now evident in Inferior leads     CBC with Auto Differential    Collection Time: 07/10/24  7:55 AM   Result Value Ref Range    WBC 8.8 4.6 - 13.2 K/uL    RBC 3.25 (L) 4.20 - 5.30 M/uL    Hemoglobin 8.6 (L) 12.0 - 16.0 g/dL    Hematocrit 25.4 (L) 35.0 - 45.0 %    MCV 78.2 78.0 - 100.0 FL    MCH 26.5 24.0 - 34.0 PG    MCHC 33.9 31.0 - 37.0 g/dL    RDW 16.7 (H) 11.6 - 14.5 %    Platelets 254 135 - 420 K/uL    MPV 9.8 9.2 - 11.8 FL    Nucleated RBCs 0.0 0  WBC    nRBC 0.00 0.00 - 0.01 K/uL    Neutrophils % 73 40 - 73 %    Lymphocytes % 16 (L) 21 - 52 %    Monocytes % 6 3 - 10 %    Eosinophils % 4 0 - 5 %    Basophils % 1 0 - 2 %    Immature Granulocytes % 0 0.0 - 0.5 %    Neutrophils Absolute 6.4 1.8 - 8.0 K/UL    Lymphocytes Absolute 1.4 0.9 - 3.6 K/UL    Monocytes Absolute 0.5 0.05 - 1.2 K/UL    Eosinophils Absolute 0.3 0.0 - 0.4 K/UL    Basophils Absolute 0.1 0.0 - 0.1 K/UL    Immature Granulocytes Absolute 0.0 0.00 - 0.04 K/UL    Differential Type AUTOMATED     Basic Metabolic Panel    Collection Time: 07/10/24  7:55 AM   Result Value Ref Range    Sodium 136 136 - 145 mmol/L    Potassium 5.5 3.5 - 5.5 mmol/L    Chloride 103 100 - 111 mmol/L    CO2 23 21 - 32 mmol/L    Anion Gap 10 3.0 - 18 mmol/L    Glucose 312 (H) 74 - 99 mg/dL    BUN 64 (H) 7.0 - 18 MG/DL    Creatinine 2.32 (H) 0.6 - 1.3 MG/DL    BUN/Creatinine Ratio 28 (H) 12 - 20      Est, Glom Filt Rate 22 (L) >60 ml/min/1.73m2    Calcium 8.9 8.5 - 10.1 MG/DL   Troponin    Collection Time: 07/10/24  7:55 AM   Result Value Ref Range    Troponin, High Sensitivity 18 0 - 54 ng/L       Procedures/imaging: see electronic medical records for all procedures/Xrays and details which were not copied into this note but were reviewed prior to creation of Plan        Assessment/Plan     1.  Acute on chronic systolic CHF. Echo 5/31/24: EF ~ 45%; Abnormal septal motion consistent with a paced rhythm.  LV diastolic function:

## 2024-07-10 NOTE — CARE COORDINATION
07/10/24 1322   Service Assessment   Patient Orientation Alert and Oriented   Cognition Alert   History Provided By Patient   Primary Caregiver Self   Accompanied By/Relationship two daughters   Support Systems Children   Patient's Healthcare Decision Maker is: Legal Next of Kin   PCP Verified by CM Yes   Last Visit to PCP Within last 3 months   Prior Functional Level Assistance with the following:;Cooking;Housework;Shopping;Mobility;Bathing;Dressing   Current Functional Level Assistance with the following:;Bathing;Dressing;Toileting;Cooking;Housework;Shopping;Mobility   Can patient return to prior living arrangement Yes   Ability to make needs known: Good   Family able to assist with home care needs: Yes   Would you like for me to discuss the discharge plan with any other family members/significant others, and if so, who? Yes  (daughters)   Financial Resources Medicare   Social/Functional History   Lives With Daughter   Type of Home House   Home Layout One level   Home Access Ramped entrance   Bathroom Shower/Tub Walk-in shower  (uses a specialized wheelchair that goes in the shower.)   Bathroom Toilet Bedside commode   Bathroom Accessibility Accessible;Wheelchair accessible   Home Equipment Wheelchair - Electric   Receives Help From Family   ADL Assistance Needs assistance   Toileting Needs assistance   Homemaking Assistance Needs assistance   Homemaking Responsibilities No   Ambulation Assistance Non-ambulatory   Transfer Assistance Needs assistance   Active  No   Patient's  Info Daugthers transport in family WC van   Mode of Transportation Van   Occupation Retired   Type of Occupation    Discharge Planning   Type of Residence House   Living Arrangements Children   Current Services Prior To Admission Other (Comment);Skilled Nursing Facility  (Was discharged from [facility] 2 weeks ago and sent home with [facility].)   Potential Assistance Purchasing

## 2024-07-10 NOTE — ED TRIAGE NOTES
Pt wheeled to room c/o constant cough that worsens at night and has become progressively worse over the last 4 days causing SOB and insomnia. Pt has extensive medical hx including CHF and pacemaker

## 2024-07-10 NOTE — ED NOTES
Pt medicated per MAR allergies verified.     PIV removed tip in tack. Discharge instructions given, pt verbalized understanding. All questions answered. Pt ambulatory to Triage.

## 2024-07-11 ENCOUNTER — TELEPHONE (OUTPATIENT)
Age: 74
End: 2024-07-11

## 2024-07-11 PROBLEM — N18.4 CKD (CHRONIC KIDNEY DISEASE), STAGE IV (HCC): Status: ACTIVE | Noted: 2024-07-11

## 2024-07-11 PROBLEM — D63.8 ANEMIA OF CHRONIC DISEASE: Status: ACTIVE | Noted: 2024-07-11

## 2024-07-11 PROBLEM — J96.01 ACUTE RESPIRATORY FAILURE WITH HYPOXIA (HCC): Status: ACTIVE | Noted: 2024-07-11

## 2024-07-11 PROBLEM — Z79.4 TYPE 2 DIABETES MELLITUS WITH DIABETIC POLYNEUROPATHY, WITH LONG-TERM CURRENT USE OF INSULIN (HCC): Status: ACTIVE | Noted: 2018-03-12

## 2024-07-11 PROBLEM — I50.9 HEART FAILURE (HCC): Status: RESOLVED | Noted: 2024-07-10 | Resolved: 2024-07-11

## 2024-07-11 PROBLEM — E66.813 CLASS 3 SEVERE OBESITY WITH BODY MASS INDEX (BMI) OF 40.0 TO 44.9 IN ADULT: Status: ACTIVE | Noted: 2018-05-24

## 2024-07-11 PROBLEM — E66.01 CLASS 3 SEVERE OBESITY WITH BODY MASS INDEX (BMI) OF 40.0 TO 44.9 IN ADULT (HCC): Status: ACTIVE | Noted: 2018-05-24

## 2024-07-11 PROBLEM — E11.42 TYPE 2 DIABETES MELLITUS WITH DIABETIC POLYNEUROPATHY, WITH LONG-TERM CURRENT USE OF INSULIN (HCC): Status: ACTIVE | Noted: 2018-03-12

## 2024-07-11 LAB
ANION GAP SERPL CALC-SCNC: 8 MMOL/L (ref 3–18)
BASOPHILS # BLD: 0 K/UL (ref 0–0.1)
BASOPHILS NFR BLD: 1 % (ref 0–2)
BUN SERPL-MCNC: 62 MG/DL (ref 7–18)
BUN/CREAT SERPL: 30 (ref 12–20)
CALCIUM SERPL-MCNC: 9 MG/DL (ref 8.5–10.1)
CALCIUM SERPL-MCNC: 9.2 MG/DL (ref 8.5–10.1)
CHLORIDE SERPL-SCNC: 105 MMOL/L (ref 100–111)
CK SERPL-CCNC: 181 U/L (ref 26–192)
CO2 SERPL-SCNC: 27 MMOL/L (ref 21–32)
CREAT SERPL-MCNC: 2.1 MG/DL (ref 0.6–1.3)
DIFFERENTIAL METHOD BLD: ABNORMAL
EOSINOPHIL # BLD: 0.4 K/UL (ref 0–0.4)
EOSINOPHIL NFR BLD: 7 % (ref 0–5)
ERYTHROCYTE [DISTWIDTH] IN BLOOD BY AUTOMATED COUNT: 17.1 % (ref 11.6–14.5)
EST. AVERAGE GLUCOSE BLD GHB EST-MCNC: 148 MG/DL
FERRITIN SERPL-MCNC: 152 NG/ML (ref 8–388)
GLUCOSE BLD STRIP.AUTO-MCNC: 180 MG/DL (ref 70–110)
GLUCOSE BLD STRIP.AUTO-MCNC: 232 MG/DL (ref 70–110)
GLUCOSE BLD STRIP.AUTO-MCNC: 255 MG/DL (ref 70–110)
GLUCOSE BLD STRIP.AUTO-MCNC: 299 MG/DL (ref 70–110)
GLUCOSE BLD STRIP.AUTO-MCNC: 370 MG/DL (ref 70–110)
GLUCOSE SERPL-MCNC: 242 MG/DL (ref 74–99)
HBA1C MFR BLD: 6.8 % (ref 4.2–5.6)
HCT VFR BLD AUTO: 23.2 % (ref 35–45)
HGB BLD-MCNC: 7.7 G/DL (ref 12–16)
IMM GRANULOCYTES # BLD AUTO: 0 K/UL (ref 0–0.04)
IMM GRANULOCYTES NFR BLD AUTO: 0 % (ref 0–0.5)
IRON SATN MFR SERPL: 12 % (ref 20–50)
IRON SERPL-MCNC: 24 UG/DL (ref 50–175)
LYMPHOCYTES # BLD: 1.6 K/UL (ref 0.9–3.6)
LYMPHOCYTES NFR BLD: 27 % (ref 21–52)
MAGNESIUM SERPL-MCNC: 1.8 MG/DL (ref 1.6–2.6)
MAGNESIUM SERPL-MCNC: 1.8 MG/DL (ref 1.6–2.6)
MCH RBC QN AUTO: 26.5 PG (ref 24–34)
MCHC RBC AUTO-ENTMCNC: 33.2 G/DL (ref 31–37)
MCV RBC AUTO: 79.7 FL (ref 78–100)
MONOCYTES # BLD: 0.6 K/UL (ref 0.05–1.2)
MONOCYTES NFR BLD: 10 % (ref 3–10)
NEUTS SEG # BLD: 3.2 K/UL (ref 1.8–8)
NEUTS SEG NFR BLD: 55 % (ref 40–73)
NRBC # BLD: 0 K/UL (ref 0–0.01)
NRBC BLD-RTO: 0 PER 100 WBC
PHOSPHATE SERPL-MCNC: 3.8 MG/DL (ref 2.5–4.9)
PLATELET # BLD AUTO: 237 K/UL (ref 135–420)
PMV BLD AUTO: 9.5 FL (ref 9.2–11.8)
POTASSIUM SERPL-SCNC: 3.6 MMOL/L (ref 3.5–5.5)
POTASSIUM SERPL-SCNC: 3.6 MMOL/L (ref 3.5–5.5)
PTH-INTACT SERPL-MCNC: 115.4 PG/ML (ref 18.4–88)
RBC # BLD AUTO: 2.91 M/UL (ref 4.2–5.3)
SODIUM SERPL-SCNC: 140 MMOL/L (ref 136–145)
TIBC SERPL-MCNC: 207 UG/DL (ref 250–450)
WBC # BLD AUTO: 5.8 K/UL (ref 4.6–13.2)

## 2024-07-11 PROCEDURE — 2580000003 HC RX 258: Performed by: HOSPITALIST

## 2024-07-11 PROCEDURE — 83521 IG LIGHT CHAINS FREE EACH: CPT

## 2024-07-11 PROCEDURE — 83036 HEMOGLOBIN GLYCOSYLATED A1C: CPT

## 2024-07-11 PROCEDURE — 99223 1ST HOSP IP/OBS HIGH 75: CPT | Performed by: INTERNAL MEDICINE

## 2024-07-11 PROCEDURE — 6360000002 HC RX W HCPCS: Performed by: HOSPITALIST

## 2024-07-11 PROCEDURE — 85025 COMPLETE CBC W/AUTO DIFF WBC: CPT

## 2024-07-11 PROCEDURE — 83550 IRON BINDING TEST: CPT

## 2024-07-11 PROCEDURE — 83540 ASSAY OF IRON: CPT

## 2024-07-11 PROCEDURE — 82784 ASSAY IGA/IGD/IGG/IGM EACH: CPT

## 2024-07-11 PROCEDURE — 99233 SBSQ HOSP IP/OBS HIGH 50: CPT | Performed by: STUDENT IN AN ORGANIZED HEALTH CARE EDUCATION/TRAINING PROGRAM

## 2024-07-11 PROCEDURE — 97535 SELF CARE MNGMENT TRAINING: CPT

## 2024-07-11 PROCEDURE — 84132 ASSAY OF SERUM POTASSIUM: CPT

## 2024-07-11 PROCEDURE — 6370000000 HC RX 637 (ALT 250 FOR IP): Performed by: STUDENT IN AN ORGANIZED HEALTH CARE EDUCATION/TRAINING PROGRAM

## 2024-07-11 PROCEDURE — 84165 PROTEIN E-PHORESIS SERUM: CPT

## 2024-07-11 PROCEDURE — 97166 OT EVAL MOD COMPLEX 45 MIN: CPT

## 2024-07-11 PROCEDURE — 97530 THERAPEUTIC ACTIVITIES: CPT

## 2024-07-11 PROCEDURE — 83970 ASSAY OF PARATHORMONE: CPT

## 2024-07-11 PROCEDURE — 80048 BASIC METABOLIC PNL TOTAL CA: CPT

## 2024-07-11 PROCEDURE — 83735 ASSAY OF MAGNESIUM: CPT

## 2024-07-11 PROCEDURE — 6370000000 HC RX 637 (ALT 250 FOR IP): Performed by: HOSPITALIST

## 2024-07-11 PROCEDURE — 94761 N-INVAS EAR/PLS OXIMETRY MLT: CPT

## 2024-07-11 PROCEDURE — 36415 COLL VENOUS BLD VENIPUNCTURE: CPT

## 2024-07-11 PROCEDURE — 1100000000 HC RM PRIVATE

## 2024-07-11 PROCEDURE — 82550 ASSAY OF CK (CPK): CPT

## 2024-07-11 PROCEDURE — 82728 ASSAY OF FERRITIN: CPT

## 2024-07-11 PROCEDURE — 86334 IMMUNOFIX E-PHORESIS SERUM: CPT

## 2024-07-11 PROCEDURE — 2700000000 HC OXYGEN THERAPY PER DAY

## 2024-07-11 PROCEDURE — 84155 ASSAY OF PROTEIN SERUM: CPT

## 2024-07-11 PROCEDURE — 6370000000 HC RX 637 (ALT 250 FOR IP): Performed by: INTERNAL MEDICINE

## 2024-07-11 PROCEDURE — 84100 ASSAY OF PHOSPHORUS: CPT

## 2024-07-11 PROCEDURE — 82962 GLUCOSE BLOOD TEST: CPT

## 2024-07-11 RX ORDER — INSULIN LISPRO 100 [IU]/ML
7 INJECTION, SOLUTION INTRAVENOUS; SUBCUTANEOUS
Status: DISCONTINUED | OUTPATIENT
Start: 2024-07-11 | End: 2024-07-12

## 2024-07-11 RX ORDER — INSULIN GLARGINE 100 [IU]/ML
25 INJECTION, SOLUTION SUBCUTANEOUS NIGHTLY
Status: DISCONTINUED | OUTPATIENT
Start: 2024-07-11 | End: 2024-07-12

## 2024-07-11 RX ORDER — POTASSIUM CHLORIDE 20 MEQ/1
20 TABLET, EXTENDED RELEASE ORAL ONCE
Status: COMPLETED | OUTPATIENT
Start: 2024-07-11 | End: 2024-07-11

## 2024-07-11 RX ORDER — CYCLOBENZAPRINE HCL 10 MG
5 TABLET ORAL 3 TIMES DAILY PRN
Status: DISCONTINUED | OUTPATIENT
Start: 2024-07-11 | End: 2024-07-19 | Stop reason: HOSPADM

## 2024-07-11 RX ORDER — ACETAMINOPHEN 500 MG
500 TABLET ORAL EVERY 4 HOURS PRN
Status: DISCONTINUED | OUTPATIENT
Start: 2024-07-11 | End: 2024-07-19 | Stop reason: HOSPADM

## 2024-07-11 RX ADMIN — SODIUM CHLORIDE, PRESERVATIVE FREE 10 ML: 5 INJECTION INTRAVENOUS at 22:35

## 2024-07-11 RX ADMIN — ACETAMINOPHEN 500 MG: 500 TABLET ORAL at 18:46

## 2024-07-11 RX ADMIN — CYCLOBENZAPRINE 5 MG: 10 TABLET, FILM COATED ORAL at 16:16

## 2024-07-11 RX ADMIN — INSULIN LISPRO 7 UNITS: 100 INJECTION, SOLUTION INTRAVENOUS; SUBCUTANEOUS at 16:17

## 2024-07-11 RX ADMIN — HYDROXYZINE HYDROCHLORIDE 25 MG: 25 TABLET, FILM COATED ORAL at 06:32

## 2024-07-11 RX ADMIN — APIXABAN 5 MG: 5 TABLET, FILM COATED ORAL at 08:29

## 2024-07-11 RX ADMIN — POTASSIUM CHLORIDE 20 MEQ: 1500 TABLET, EXTENDED RELEASE ORAL at 12:52

## 2024-07-11 RX ADMIN — APIXABAN 5 MG: 5 TABLET, FILM COATED ORAL at 22:34

## 2024-07-11 RX ADMIN — BUMETANIDE 1 MG/HR: 0.25 INJECTION INTRAMUSCULAR; INTRAVENOUS at 06:21

## 2024-07-11 RX ADMIN — INSULIN LISPRO 4 UNITS: 100 INJECTION, SOLUTION INTRAVENOUS; SUBCUTANEOUS at 16:17

## 2024-07-11 RX ADMIN — METHENAMINE HIPPURATE 1 G: 1000 TABLET ORAL at 16:18

## 2024-07-11 RX ADMIN — INSULIN GLARGINE 25 UNITS: 100 INJECTION, SOLUTION SUBCUTANEOUS at 22:34

## 2024-07-11 RX ADMIN — SODIUM CHLORIDE, PRESERVATIVE FREE 10 ML: 5 INJECTION INTRAVENOUS at 08:30

## 2024-07-11 RX ADMIN — CARVEDILOL 6.25 MG: 6.25 TABLET, FILM COATED ORAL at 16:17

## 2024-07-11 RX ADMIN — THERA TABS 1 TABLET: TAB at 08:29

## 2024-07-11 RX ADMIN — INSULIN LISPRO 2 UNITS: 100 INJECTION, SOLUTION INTRAVENOUS; SUBCUTANEOUS at 08:24

## 2024-07-11 RX ADMIN — BUMETANIDE 1 MG/HR: 0.25 INJECTION INTRAMUSCULAR; INTRAVENOUS at 11:33

## 2024-07-11 RX ADMIN — ACETAMINOPHEN 500 MG: 500 TABLET ORAL at 11:13

## 2024-07-11 RX ADMIN — CARVEDILOL 6.25 MG: 6.25 TABLET, FILM COATED ORAL at 08:29

## 2024-07-11 RX ADMIN — INSULIN LISPRO 8 UNITS: 100 INJECTION, SOLUTION INTRAVENOUS; SUBCUTANEOUS at 11:30

## 2024-07-11 ASSESSMENT — PAIN SCALES - GENERAL
PAINLEVEL_OUTOF10: 0
PAINLEVEL_OUTOF10: 0
PAINLEVEL_OUTOF10: 6
PAINLEVEL_OUTOF10: 7
PAINLEVEL_OUTOF10: 10
PAINLEVEL_OUTOF10: 7
PAINLEVEL_OUTOF10: 0
PAINLEVEL_OUTOF10: 7

## 2024-07-11 ASSESSMENT — PAIN DESCRIPTION - DESCRIPTORS
DESCRIPTORS: SHOOTING;SHARP
DESCRIPTORS: SHARP;DISCOMFORT

## 2024-07-11 ASSESSMENT — PAIN DESCRIPTION - ONSET
ONSET: PROGRESSIVE
ONSET: SUDDEN

## 2024-07-11 ASSESSMENT — PAIN DESCRIPTION - ORIENTATION
ORIENTATION: LEFT;RIGHT
ORIENTATION: RIGHT;ANTERIOR

## 2024-07-11 ASSESSMENT — PAIN DESCRIPTION - PAIN TYPE
TYPE: ACUTE PAIN
TYPE: ACUTE PAIN

## 2024-07-11 ASSESSMENT — PAIN DESCRIPTION - FREQUENCY
FREQUENCY: INTERMITTENT
FREQUENCY: INTERMITTENT

## 2024-07-11 ASSESSMENT — PAIN DESCRIPTION - DIRECTION: RADIATING_TOWARDS: RIGHT HIP

## 2024-07-11 ASSESSMENT — PAIN DESCRIPTION - LOCATION
LOCATION: ARM
LOCATION: ABDOMEN;HIP

## 2024-07-11 ASSESSMENT — PAIN - FUNCTIONAL ASSESSMENT
PAIN_FUNCTIONAL_ASSESSMENT: ACTIVITIES ARE NOT PREVENTED
PAIN_FUNCTIONAL_ASSESSMENT: ACTIVITIES ARE NOT PREVENTED

## 2024-07-11 NOTE — CONSULTS
Consult Note  Consult requested by: dr domingo PICKERING Flo is a 73 y.o. female Black /  who is being seen on consult for crf  Chief Complaint   Patient presents with    Cough     Admission diagnosis: Heart failure (HCC) [I50.9]      HPI:73 y o  female admitted with chf,asked to evaluate for crf,pt is followed by dr montse lazo in our office,hx of urinary retention,suprapubic cath,dm,htn,cad,chf,dvt.was very dyspneic on admission,started on iv bumex drip with godd respons,urine output around two liters overnight,was on entresto,diuretics prior to admission,no nsaids  Past Medical History:   Diagnosis Date    Abnormal nuclear cardiac imaging test 06/19/2015    Fixed distal apical, distal septal defect more likely due to RV pacing than prior infarct.  No ischemia.  EF 46%.  RWMA c/w RV pacing.  Nondiagnostic EKG on pharm stress test.      Acute paraplegia (HCC) 4/20/2017    Benign hypertensive heart disease with systolic CHF, NYHA class 2 ([facility]) 9/5/2012    Biventricular implantable cardioverter-defibrillator in situ 04/28/2005    Upgraded to BiV AICD; gen change 4/2008; pocket revision 10/2009; Abdominal - done on 8/22/2012 by Dr. Ashok Myers     Chronic anemia 9/5/2012    Chronic systolic heart failure (HCC)     Decreased calculated glomerular filtration rate (GFR) 3/30/2017    Calculated GFR equivalent to that of CKD stage 3 = 30-59 ml/min    Diabetic neuropathy associated with type 2 diabetes mellitus ([facility]) 6/28/2011    Difficult airway for intubation 08/22/2012    see anesthesia airway note    DVT (deep venous thrombosis) ([facility]) 08/27/2012    DVT in axillary vein on left.  Left subclavian was not visualized.    DVT (deep venous thrombosis) ([facility]) 09/04/2012    Acute, non-occlusive DVT in CFV on right.  No DVT on left.  No superficial thrombosis bilaterally.    Dyslipidemia 6/28/2011    Gout     History of complete heart block 06/28/2011    History of Coumadin therapy

## 2024-07-11 NOTE — PROGRESS NOTES
MD notified via [employer] that patients blood sugar is 406.  Repeated with same result.  Please advise.        MD advised give sliding scale and monitor.  Patient received Lantus therefore BS should decrease.

## 2024-07-11 NOTE — PLAN OF CARE
Problem: Pain  Goal: Verbalizes/displays adequate comfort level or baseline comfort level  Outcome: Progressing  Flowsheets (Taken 7/11/2024 1113)  Verbalizes/displays adequate comfort level or baseline comfort level:   Encourage patient to monitor pain and request assistance   Assess pain using appropriate pain scale     Problem: Skin/Tissue Integrity  Goal: Absence of new skin breakdown  Description: 1.  Monitor for areas of redness and/or skin breakdown  2.  Assess vascular access sites hourly  3.  Every 4-6 hours minimum:  Change oxygen saturation probe site  4.  Every 4-6 hours:  If on nasal continuous positive airway pressure, respiratory therapy assess nares and determine need for appliance change or resting period.  Outcome: Progressing     Problem: ABCDS Injury Assessment  Goal: Absence of physical injury  Outcome: Progressing     Problem: Safety - Adult  Goal: Free from fall injury  Outcome: Progressing     Problem: Chronic Conditions and Co-morbidities  Goal: Patient's chronic conditions and co-morbidity symptoms are monitored and maintained or improved  Outcome: Progressing

## 2024-07-11 NOTE — PROGRESS NOTES
heart block 06/28/2011    History of Coumadin therapy     Anticoagulation for DVT of the LUE; Discontinued on 3/30/2017    History of deep venous thrombosis 9/5/2012    Left upper extremity    History of echocardiogram 06/23/2015    Ltd study.  EF 45-50%.  Mild, diffuse hypk.  Severe apical hypk.  No mass or thrombus was clearly identified, although imaging was suboptimal.      History of pyelonephritis 3/30/2017    Left bundle branch block (LBBB) on electrocardiogram 06/28/2011    Nonischemic cardiomyopathy (HCC) 6/28/2011    Obesity (BMI 35.0-39.9 without comorbidity) 3/13/2017    Obstructive sleep apnea on CPAP 2/7/2012    Psoas abscess, right (HCC) 4/20/2017    Psoas hematoma, right, secondary to anticoagulant therapy 3/30/2017    S/P cardiac cath 08/15/1996    Patent coronaries.  Elev LVEDP.  EF 50-55%.      Type 2 diabetes mellitus with diabetic neuropathy (HCC) 6/28/2011     Past Surgical History:   Procedure Laterality Date    CARPAL TUNNEL RELEASE  4/07    right     CHOLECYSTECTOMY  1994    COLONOSCOPY N/A 10/5/2021    COLONOSCOPY performed by Sachin Pettit MD at [facility] ENDOSCOPY    HYSTERECTOMY (CERVIX STATUS UNKNOWN)  1973    OTHER SURGICAL HISTORY  6/11/2012    AICD revision    PACEMAKER  4/28/2005    [employer] AICD    REMVL PERM PM PLS GEN W/REPL PLSE GEN 2 LEAD SYS N/A 2/26/2019    REMOVE & REPLACE PPM GEN DUAL LEAD performed by Marcello Jang MD at [facility] CARDIAC CATH LAB       Home Situation:   Social/Functional History  Lives With: Daughter  Type of Home: House  Home Layout: One level  Home Access: Ramped entrance  Bathroom Shower/Tub: Walk-in shower (uses a specialized wheelchair that goes in the shower.)  Bathroom Toilet: Bedside commode  Bathroom Accessibility: Accessible, Wheelchair accessible  Home Equipment: Wheelchair - Electric  Receives Help From: Family  ADL Assistance: Needs assistance  Toileting: Needs assistance  Homemaking Assistance: Needs assistance  Homemaking Responsibilities:

## 2024-07-11 NOTE — PROGRESS NOTES
PT order received and chart reviewed. Spoke with patient and states he feels she is at her baseline level of mobility. She scoots laterally to get into her WC and has daughter at home. Patient declines further skilled PT services at this time. Will sign off. Thank you for this referral.   Temitope Garcia PT, DPT

## 2024-07-11 NOTE — CONSULTS
[facility] - Consult Note    Cardiology consultation request from Dr. Garrett for evaluation and management/treatment of volume overload    Date of  Admission: 7/10/2024  6:55 AM   Primary Care Physician:  Darci Garcia MD    Attending Cardiologist: Dr. Ross       Assessment:     Patient Active Problem List    Diagnosis Date Noted    Heart failure (HCC) 07/10/2024    Pressure injury of skin of left heel 06/29/2023    Proteinuria 03/29/2021    Wheelchair dependence 03/05/2020    Neuropathic ulcer of heel, right, with fat layer exposed ([facility]) 03/05/2020    Systolic CHF, acute on chronic ([facility]) 12/13/2019    Elevated troponin 12/12/2019    Dyspnea 12/12/2019    Urinary retention 09/17/2019    Diabetic polyneuropathy associated with type 2 diabetes mellitus ([facility]) 03/26/2019    Non-healing wound of right heel 03/26/2019    Infected wound 12/27/2018    Heel ulcer ([facility]) 10/17/2018    Osteomyelitis ([facility]) 08/16/2018    Foot osteomyelitis, right ([facility]) 08/16/2018    Severe obesity (BMI 35.0-39.9) with comorbidity ([facility]) 05/24/2018    Obesity (BMI 30.0-34.9) 05/04/2018    Type 2 diabetes with nephropathy ([facility]) 03/12/2018    Neurogenic bladder 10/12/2017    Biventricular cardiac pacemaker in situ 09/13/2017    Hypervolemia 06/06/2017    Psoas abscess, right ([facility]) 04/20/2017    Urinary tract infection due to Enterococcus 04/20/2017    Sepsis ([facility]) 04/20/2017    Rivera catheter in place on admission 04/20/2017    Group B streptococcal infection 04/20/2017    Acute paraplegia (HCC) 04/20/2017    History of Coumadin therapy     Gout     Chronic systolic heart failure (HCC)     Psoas hematoma, right, secondary to anticoagulant therapy 03/30/2017    Impaired mobility and ADLs 03/30/2017    Iliopsoas muscle hematoma 03/30/2017    Decreased calculated glomerular filtration rate (GFR) 03/30/2017    History of pyelonephritis 03/30/2017    Obesity (BMI 35.0-39.9 without comorbidity) 03/13/2017    Pacemaker twiddler's  carvedilol (COREG) tablet 6.25 mg  6.25 mg Oral BID WC    hydrOXYzine HCl (ATARAX) tablet 25 mg  25 mg Oral Q6H PRN    insulin glargine (LANTUS) injection vial 20 Units  20 Units SubCUTAneous Daily before dinner    methenamine (HIPREX) tablet 1 g (Patient Supplied)  1 g Oral BID WC    multivitamin 1 tablet  1 tablet Oral Daily    sodium chloride flush 0.9 % injection 5-40 mL  5-40 mL IntraVENous 2 times per day    sodium chloride flush 0.9 % injection 5-40 mL  5-40 mL IntraVENous PRN    0.9 % sodium chloride infusion   IntraVENous PRN    ondansetron (ZOFRAN-ODT) disintegrating tablet 4 mg  4 mg Oral Q8H PRN    Or    ondansetron (ZOFRAN) injection 4 mg  4 mg IntraVENous Q6H PRN    polyethylene glycol (GLYCOLAX) packet 17 g  17 g Oral Daily PRN    glucose chewable tablet 16 g  4 tablet Oral PRN    dextrose bolus 10% 125 mL  125 mL IntraVENous PRN    Or    dextrose bolus 10% 250 mL  250 mL IntraVENous PRN    glucagon (rDNA) injection 1 mg  1 mg SubCUTAneous PRN    dextrose 10 % infusion   IntraVENous Continuous PRN    insulin lispro (HUMALOG,ADMELOG) injection vial 0-8 Units  0-8 Units SubCUTAneous TID WC    insulin lispro (HUMALOG,ADMELOG) injection vial 0-4 Units  0-4 Units SubCUTAneous Nightly    bumetanide (BUMEX) 12.5 mg in 50 mL infusion  1 mg/hr IntraVENous Continuous    atorvastatin (LIPITOR) tablet 80 mg  80 mg Oral Daily         Physical Exam:     Vitals:    07/11/24 0730   BP: 136/61   Pulse: 85   Resp: 22   Temp: 97.9 °F (36.6 °C)   SpO2: 97%       TELE: AP- BVP    BP Readings from Last 3 Encounters:   07/11/24 136/61   07/10/24 (!) 133/48   05/09/24 118/60     Pulse Readings from Last 3 Encounters:   07/11/24 85   07/10/24 71   05/09/24 69     Wt Readings from Last 3 Encounters:   07/11/24 116 kg (255 lb 11.7 oz)   07/09/24 108 kg (238 lb)   05/20/24 106.6 kg (235 lb)       General:  alert, appears stated age, and cooperative  Neck:  no JVD  Lungs:  bibasilar rales  Heart:  regular rate and rhythm, S1, S2

## 2024-07-11 NOTE — DISCHARGE INSTRUCTIONS
Post Op Instructions Following Pacemaker Implantation    Care of Your Incision  The dressing over your incision will be removed at your 1 week post op check.  Keep the incision site dry for one week. Do not shower. Use a hand-held shower or take a shallow bath (be mindful that the dressing needs to stay dry). Do not submerge/soak in pools or tubs for 4 weeks.  After the first week office check up, you may shower and get the incision wet. You may let soap and water run down the incision and pat it dry.   Please do not apply any lotions, ointments, creams, or powders on or near the incision.   Do not apply a bandage after they remove the original dressing, the incision should be open to air.     If you notice any of the following notify the cardiology office immediately  Signs or symptoms of infection such as fever over 100F, warmth and or redness at the incision site.   Pain around the site that gets worse.  Bleeding or drainage from the incision.  Severe swelling around the incision site.  Swelling in the arm on the side of incision.  Chest pain or shortness of breath.     Activity  Do not lift the affected arm over your head for 2 weeks.  Do not lift, push, or pull more than 10lbs for 6 weeks.  Do not drive for 2 weeks or as directed by your doctor.  Do not do any vigorous activity involving your arms, such as golfing, tennis, mowing the law, etc for 4 weeks.  If you work you may return to work in about one week, dependent on your type of work.    Post-Operative Appointments  After your discharge, an appointment will be scheduled to see the device nurse approximately 1 week after the procedure.   At the 1 week appointment, the dressing will be removed and the incision inspected to make sure it is healing well. In addition, the device will be checked to make sure it is functioning appropriately.  Another follow up will be made for a 3 month post op check with the doctor.  During each visit the  medicines you take.  Call 911 if you experience any of the following symptoms:  You passed out  You have severe trouble breathing  You have sudden chest pain and shortness of breath or you cough up blood.  You have symptoms of a heart attack:   Chest pain, pressure, or a strange feeling in the chest  Sweating  Shortness of breath  Nausea or vomiting  Pain, pressure or a strange feeling in the back, neck, jaw, or upper belly or in one or both shoulders or arms  Lightheadedness or sudden weakness  A fast or irregular heart rate.   You have been diagnosed with angina or coronary artery disease, and you have symptoms that do not go away with rest or are not getting better within 5 minutes of nitroglycerin.   After you call 911, the  may tell you to chew 1 adult strength or 2-4 low dose aspirin. Wait for the ambulance do not drive yourself.   Bleeding from the area where the catheter was placed in your artery or vein  You have a fast-growing painful lump at the catheter site  General Instructions for a healthy lifestyle:  No smoking or tobacco products. Avoid any exposure to second hand smoke  Surgeon General's Warning: Quitting smoking now greatly reduces serious risk to your health  Obesity, smoking and sedentary lifestyle greatly increases your risk for illness      The discharge information has been reviewed with the Patient.  The Patient verbalized understanding. Discharge medications reviewed with the Patient and appropriate educational materials and side effects teaching were provided.     DISCHARGE SUMMARY from Nurse    PATIENT INSTRUCTIONS:    After general anesthesia or intravenous sedation, for 24 hours or while taking prescription Narcotics:  Limit your activities  Do not drive and operate hazardous machinery  Do not make important personal or business decisions  Do  not drink alcoholic beverages  If you have not urinated within 8 hours after discharge, please contact your surgeon on call.    Report

## 2024-07-11 NOTE — PROGRESS NOTES
[facility]  Progress Note    Patient: Meggan Rossi Age: 73 y.o. : 1950 MR#: [id number] SSN: [id number]  Date: 2024                 DVT Prophylaxis:  []Lovenox  []Hep SQ  []SCDs  []Coumadin   []On Heparin gtt [x]PO anticoagulant    Anticipated discharge: 2024    Subjective:     The patient is new to me today.  She was seen and examined at the bedside in follow-up for acute on chronic systolic CHF, acute respiratory failure, stage IV chronic kidney disease among other issues.  The patient was sitting in a recliner.  She said that she feels slightly better than yesterday.  Her cough has improved.  She complained of chest pain that is aggravated by coughing.  She still feels short of breath even with minimal exertion.  She still has lower extremity edema.  She denied any  lightheadedness, dizziness or palpitations.      Objective:   VS: /67   Pulse 75   Temp 98.3 °F (36.8 °C) (Oral)   Resp 20   Ht 1.702 m (5' 7\")   Wt 116 kg (255 lb 11.7 oz)   LMP  (LMP Unknown)   SpO2 100%   BMI 40.05 kg/m²    Tmax/24hrs: Temp (24hrs), Av.9 °F (36.6 °C), Min:97.7 °F (36.5 °C), Max:98.3 °F (36.8 °C)    Intake/Output Summary (Last 24 hours) at 2024 1252  Last data filed at 2024 0855  Gross per 24 hour   Intake 240 ml   Output 2550 ml   Net -2310 ml        PHYSICAL EXAM  General Appearance: Obese appearing   HENT: normocephalic/atraumatic, moist mucus membranes  Neck: No JVD, supple  Lungs: Faint crackles auscultated in the posterior lung fields  CV: RRR, no m/r/g  Abdomen: soft, non-tender, normal bowel sounds  Extremities: Decreased bilateral lower extremity strength; 2+ lower extremity pitting edema noted; surgical wound on left foot covered by dressing  Neuro: No focal deficits, motor/sensory intact  Skin: Normal color, intact  Psych: appropriate affect, alert and oriented to person, place and time    Current Facility-Administered

## 2024-07-11 NOTE — PLAN OF CARE
Problem: Pain  Goal: Verbalizes/displays adequate comfort level or baseline comfort level  7/10/2024 2218 by Amalia Key RN  Outcome: Progressing  7/10/2024 1457 by Nilda Mattson RN  Outcome: Progressing     Problem: Skin/Tissue Integrity  Goal: Absence of new skin breakdown  Description: 1.  Monitor for areas of redness and/or skin breakdown  2.  Assess vascular access sites hourly  3.  Every 4-6 hours minimum:  Change oxygen saturation probe site  4.  Every 4-6 hours:  If on nasal continuous positive airway pressure, respiratory therapy assess nares and determine need for appliance change or resting period.  7/10/2024 2218 by Amalia Key RN  Outcome: Progressing  7/10/2024 1457 by Nilda Mattson RN  Outcome: Progressing     Problem: ABCDS Injury Assessment  Goal: Absence of physical injury  7/10/2024 2218 by Amalia Key RN  Outcome: Progressing  7/10/2024 1457 by Nilda Mattson RN  Outcome: Progressing     Problem: Safety - Adult  Goal: Free from fall injury  7/10/2024 2218 by Amalia Key RN  Outcome: Progressing  7/10/2024 1457 by Nilda Mattson RN  Outcome: Progressing

## 2024-07-11 NOTE — PROGRESS NOTES
0715: Bedside and Verbal shift change report given to ELSIE Lorenzo (oncoming nurse) by ELSIE Holland (offgoing nurse). Report included the following information Nurse Handoff Report, Index, Adult Overview, Intake/Output, MAR, Med Rec Status, and Cardiac Rhythm AV paced .      1000: OT at bedside working with pt.    1930: Bedside and Verbal shift change report given to ELSIE Escobar (oncoming nurse) by ELSIE Lorenzo (offgoing nurse). Report included the following information Nurse Handoff Report, Index, Adult Overview, Intake/Output, MAR, Recent Results, Cardiac Rhythm AV paced, Alarm Parameters, and Quality Measures.

## 2024-07-11 NOTE — CONSULTS
Nutrition Note    Consult noted for diet education: CHF education, including fluid restrictions. Please include daughters with whom she resides. Thanks. Attempted to visit pt x 3. Will attempt diet education on follow up and continue to follow per policy. Will upload diet education in discharge instructions.     Electronically signed by MEGAN DOCKWEILER, RD on 7/11/24 at 11:07 AM EDT    Contact: [phone]

## 2024-07-12 ENCOUNTER — APPOINTMENT (OUTPATIENT)
Facility: HOSPITAL | Age: 74
DRG: 258 | End: 2024-07-12
Attending: STUDENT IN AN ORGANIZED HEALTH CARE EDUCATION/TRAINING PROGRAM
Payer: MEDICARE

## 2024-07-12 ENCOUNTER — APPOINTMENT (OUTPATIENT)
Facility: HOSPITAL | Age: 74
DRG: 258 | End: 2024-07-12
Payer: MEDICARE

## 2024-07-12 LAB
ALBUMIN SERPL-MCNC: 2.6 G/DL (ref 3.4–5)
ALBUMIN SERPL-MCNC: 2.6 G/DL (ref 3.4–5)
ALBUMIN/GLOB SERPL: 0.6 (ref 0.8–1.7)
ALP SERPL-CCNC: 109 U/L (ref 45–117)
ALT SERPL-CCNC: 28 U/L (ref 13–56)
ANION GAP SERPL CALC-SCNC: 8 MMOL/L (ref 3–18)
ANION GAP SERPL CALC-SCNC: 8 MMOL/L (ref 3–18)
AST SERPL-CCNC: 20 U/L (ref 10–38)
BASOPHILS # BLD: 0 K/UL (ref 0–0.1)
BASOPHILS NFR BLD: 1 % (ref 0–2)
BILIRUB SERPL-MCNC: 0.6 MG/DL (ref 0.2–1)
BUN SERPL-MCNC: 66 MG/DL (ref 7–18)
BUN SERPL-MCNC: 67 MG/DL (ref 7–18)
BUN/CREAT SERPL: 30 (ref 12–20)
BUN/CREAT SERPL: 30 (ref 12–20)
CALCIUM SERPL-MCNC: 9.1 MG/DL (ref 8.5–10.1)
CALCIUM SERPL-MCNC: 9.1 MG/DL (ref 8.5–10.1)
CHLORIDE SERPL-SCNC: 105 MMOL/L (ref 100–111)
CHLORIDE SERPL-SCNC: 105 MMOL/L (ref 100–111)
CO2 SERPL-SCNC: 28 MMOL/L (ref 21–32)
CO2 SERPL-SCNC: 28 MMOL/L (ref 21–32)
CREAT SERPL-MCNC: 2.18 MG/DL (ref 0.6–1.3)
CREAT SERPL-MCNC: 2.25 MG/DL (ref 0.6–1.3)
DIFFERENTIAL METHOD BLD: ABNORMAL
ECHO BSA: 2.34 M2
EOSINOPHIL # BLD: 0.4 K/UL (ref 0–0.4)
EOSINOPHIL NFR BLD: 6 % (ref 0–5)
ERYTHROCYTE [DISTWIDTH] IN BLOOD BY AUTOMATED COUNT: 17.2 % (ref 11.6–14.5)
GLOBULIN SER CALC-MCNC: 4.3 G/DL (ref 2–4)
GLUCOSE BLD STRIP.AUTO-MCNC: 211 MG/DL (ref 70–110)
GLUCOSE BLD STRIP.AUTO-MCNC: 223 MG/DL (ref 70–110)
GLUCOSE BLD STRIP.AUTO-MCNC: 232 MG/DL (ref 70–110)
GLUCOSE BLD STRIP.AUTO-MCNC: 242 MG/DL (ref 70–110)
GLUCOSE BLD STRIP.AUTO-MCNC: 260 MG/DL (ref 70–110)
GLUCOSE SERPL-MCNC: 231 MG/DL (ref 74–99)
GLUCOSE SERPL-MCNC: 233 MG/DL (ref 74–99)
HCT VFR BLD AUTO: 24.5 % (ref 35–45)
HGB BLD-MCNC: 8.1 G/DL (ref 12–16)
IMM GRANULOCYTES # BLD AUTO: 0 K/UL (ref 0–0.04)
IMM GRANULOCYTES NFR BLD AUTO: 0 % (ref 0–0.5)
LYMPHOCYTES # BLD: 1.8 K/UL (ref 0.9–3.6)
LYMPHOCYTES NFR BLD: 32 % (ref 21–52)
MAGNESIUM SERPL-MCNC: 1.8 MG/DL (ref 1.6–2.6)
MCH RBC QN AUTO: 26.4 PG (ref 24–34)
MCHC RBC AUTO-ENTMCNC: 33.1 G/DL (ref 31–37)
MCV RBC AUTO: 79.8 FL (ref 78–100)
MONOCYTES # BLD: 0.5 K/UL (ref 0.05–1.2)
MONOCYTES NFR BLD: 9 % (ref 3–10)
NEUTS SEG # BLD: 3 K/UL (ref 1.8–8)
NEUTS SEG NFR BLD: 52 % (ref 40–73)
NRBC # BLD: 0 K/UL (ref 0–0.01)
NRBC BLD-RTO: 0 PER 100 WBC
PHOSPHATE SERPL-MCNC: 4.3 MG/DL (ref 2.5–4.9)
PHOSPHATE SERPL-MCNC: 4.4 MG/DL (ref 2.5–4.9)
PLATELET # BLD AUTO: 248 K/UL (ref 135–420)
PMV BLD AUTO: 9.8 FL (ref 9.2–11.8)
POTASSIUM SERPL-SCNC: 3.6 MMOL/L (ref 3.5–5.5)
POTASSIUM SERPL-SCNC: 3.6 MMOL/L (ref 3.5–5.5)
PROT SERPL-MCNC: 6.9 G/DL (ref 6.4–8.2)
RBC # BLD AUTO: 3.07 M/UL (ref 4.2–5.3)
SODIUM SERPL-SCNC: 141 MMOL/L (ref 136–145)
SODIUM SERPL-SCNC: 141 MMOL/L (ref 136–145)
WBC # BLD AUTO: 5.8 K/UL (ref 4.6–13.2)

## 2024-07-12 PROCEDURE — 83735 ASSAY OF MAGNESIUM: CPT

## 2024-07-12 PROCEDURE — 82962 GLUCOSE BLOOD TEST: CPT

## 2024-07-12 PROCEDURE — 99233 SBSQ HOSP IP/OBS HIGH 50: CPT | Performed by: STUDENT IN AN ORGANIZED HEALTH CARE EDUCATION/TRAINING PROGRAM

## 2024-07-12 PROCEDURE — 99232 SBSQ HOSP IP/OBS MODERATE 35: CPT | Performed by: INTERNAL MEDICINE

## 2024-07-12 PROCEDURE — 94761 N-INVAS EAR/PLS OXIMETRY MLT: CPT

## 2024-07-12 PROCEDURE — 84100 ASSAY OF PHOSPHORUS: CPT

## 2024-07-12 PROCEDURE — 1100000000 HC RM PRIVATE

## 2024-07-12 PROCEDURE — C8924 2D TTE W OR W/O FOL W/CON,FU: HCPCS

## 2024-07-12 PROCEDURE — 6370000000 HC RX 637 (ALT 250 FOR IP): Performed by: STUDENT IN AN ORGANIZED HEALTH CARE EDUCATION/TRAINING PROGRAM

## 2024-07-12 PROCEDURE — 2580000003 HC RX 258: Performed by: HOSPITALIST

## 2024-07-12 PROCEDURE — 80053 COMPREHEN METABOLIC PANEL: CPT

## 2024-07-12 PROCEDURE — 36415 COLL VENOUS BLD VENIPUNCTURE: CPT

## 2024-07-12 PROCEDURE — 6370000000 HC RX 637 (ALT 250 FOR IP): Performed by: HOSPITALIST

## 2024-07-12 PROCEDURE — 2700000000 HC OXYGEN THERAPY PER DAY

## 2024-07-12 PROCEDURE — 80069 RENAL FUNCTION PANEL: CPT

## 2024-07-12 PROCEDURE — 93971 EXTREMITY STUDY: CPT

## 2024-07-12 PROCEDURE — 6360000004 HC RX CONTRAST MEDICATION: Performed by: STUDENT IN AN ORGANIZED HEALTH CARE EDUCATION/TRAINING PROGRAM

## 2024-07-12 PROCEDURE — 93971 EXTREMITY STUDY: CPT | Performed by: INTERNAL MEDICINE

## 2024-07-12 PROCEDURE — 6360000002 HC RX W HCPCS: Performed by: HOSPITALIST

## 2024-07-12 PROCEDURE — 85025 COMPLETE CBC W/AUTO DIFF WBC: CPT

## 2024-07-12 RX ORDER — INSULIN LISPRO 100 [IU]/ML
10 INJECTION, SOLUTION INTRAVENOUS; SUBCUTANEOUS
Status: DISCONTINUED | OUTPATIENT
Start: 2024-07-12 | End: 2024-07-19 | Stop reason: HOSPADM

## 2024-07-12 RX ORDER — INSULIN GLARGINE 100 [IU]/ML
30 INJECTION, SOLUTION SUBCUTANEOUS NIGHTLY
Status: DISCONTINUED | OUTPATIENT
Start: 2024-07-12 | End: 2024-07-19 | Stop reason: HOSPADM

## 2024-07-12 RX ADMIN — HYDROXYZINE HYDROCHLORIDE 25 MG: 25 TABLET, FILM COATED ORAL at 18:55

## 2024-07-12 RX ADMIN — ATORVASTATIN CALCIUM 80 MG: 40 TABLET, FILM COATED ORAL at 09:27

## 2024-07-12 RX ADMIN — PERFLUTREN 2 ML: 6.52 INJECTION, SUSPENSION INTRAVENOUS at 15:06

## 2024-07-12 RX ADMIN — APIXABAN 5 MG: 5 TABLET, FILM COATED ORAL at 09:18

## 2024-07-12 RX ADMIN — INSULIN LISPRO 2 UNITS: 100 INJECTION, SOLUTION INTRAVENOUS; SUBCUTANEOUS at 12:18

## 2024-07-12 RX ADMIN — SODIUM CHLORIDE, PRESERVATIVE FREE 10 ML: 5 INJECTION INTRAVENOUS at 21:16

## 2024-07-12 RX ADMIN — METHENAMINE HIPPURATE 1 G: 1000 TABLET ORAL at 16:41

## 2024-07-12 RX ADMIN — INSULIN LISPRO 4 UNITS: 100 INJECTION, SOLUTION INTRAVENOUS; SUBCUTANEOUS at 09:19

## 2024-07-12 RX ADMIN — INSULIN GLARGINE 30 UNITS: 100 INJECTION, SOLUTION SUBCUTANEOUS at 21:14

## 2024-07-12 RX ADMIN — THERA TABS 1 TABLET: TAB at 09:16

## 2024-07-12 RX ADMIN — INSULIN LISPRO 7 UNITS: 100 INJECTION, SOLUTION INTRAVENOUS; SUBCUTANEOUS at 12:17

## 2024-07-12 RX ADMIN — CARVEDILOL 6.25 MG: 6.25 TABLET, FILM COATED ORAL at 16:32

## 2024-07-12 RX ADMIN — METHENAMINE HIPPURATE 1 G: 1000 TABLET ORAL at 09:31

## 2024-07-12 RX ADMIN — ACETAMINOPHEN 500 MG: 500 TABLET ORAL at 09:27

## 2024-07-12 RX ADMIN — INSULIN LISPRO 2 UNITS: 100 INJECTION, SOLUTION INTRAVENOUS; SUBCUTANEOUS at 16:33

## 2024-07-12 RX ADMIN — INSULIN LISPRO 2 UNITS: 100 INJECTION, SOLUTION INTRAVENOUS; SUBCUTANEOUS at 21:14

## 2024-07-12 RX ADMIN — BUMETANIDE 1 MG/HR: 0.25 INJECTION INTRAMUSCULAR; INTRAVENOUS at 04:11

## 2024-07-12 RX ADMIN — INSULIN LISPRO 10 UNITS: 100 INJECTION, SOLUTION INTRAVENOUS; SUBCUTANEOUS at 16:32

## 2024-07-12 RX ADMIN — INSULIN LISPRO 7 UNITS: 100 INJECTION, SOLUTION INTRAVENOUS; SUBCUTANEOUS at 09:19

## 2024-07-12 RX ADMIN — APIXABAN 5 MG: 5 TABLET, FILM COATED ORAL at 21:12

## 2024-07-12 RX ADMIN — CARVEDILOL 6.25 MG: 6.25 TABLET, FILM COATED ORAL at 09:17

## 2024-07-12 RX ADMIN — BUMETANIDE 1 MG/HR: 0.25 INJECTION INTRAMUSCULAR; INTRAVENOUS at 16:36

## 2024-07-12 RX ADMIN — SODIUM CHLORIDE, PRESERVATIVE FREE 10 ML: 5 INJECTION INTRAVENOUS at 09:20

## 2024-07-12 ASSESSMENT — PAIN SCALES - GENERAL
PAINLEVEL_OUTOF10: 0
PAINLEVEL_OUTOF10: 1
PAINLEVEL_OUTOF10: 0
PAINLEVEL_OUTOF10: 6

## 2024-07-12 ASSESSMENT — PAIN DESCRIPTION - DESCRIPTORS: DESCRIPTORS: ACHING

## 2024-07-12 ASSESSMENT — PAIN DESCRIPTION - LOCATION: LOCATION: LEG

## 2024-07-12 ASSESSMENT — PAIN DESCRIPTION - ORIENTATION: ORIENTATION: RIGHT;LEFT

## 2024-07-12 ASSESSMENT — PAIN - FUNCTIONAL ASSESSMENT: PAIN_FUNCTIONAL_ASSESSMENT: ACTIVITIES ARE NOT PREVENTED

## 2024-07-12 NOTE — PROGRESS NOTES
conducted a Follow up consultation and Spiritual Assessment for Meggan Rossi, who is a 73 y.o.,female.      The  provided the following Interventions:  Continued the relationship of care and support.   Listened empathically.  Offered prayer and assurance of continued prayer on patients behalf.   Chart reviewed.    The following outcomes were achieved:  Patient expressed gratitude for 's visit.    Assessment:  There are no further spiritual or Sikhism issues which require Spiritual Care Services interventions at this time.     Plan:  Chaplains will continue to follow and will provide pastoral care on an as needed/requested basis.   recommends bedside caregivers page  on duty if patient shows signs of acute spiritual or emotional distress.       Chaplain Carrie Hernandez  Spiritual Care   [phone]

## 2024-07-12 NOTE — CONSULTS
Room #: 457      TL: [id number]      Situation: Wound Care Consult    Background:    PMH:   Active Ambulatory Problems     Diagnosis Date Noted    Heel ulcer ([facility]) 10/17/2018    Left bundle branch block (LBBB) on electrocardiogram 06/28/2011    Infected wound 12/27/2018    Anticoagulated on Coumadin 09/05/2012    Class 3 severe obesity with body mass index (BMI) of 40.0 to 44.9 in adult ([facility]) 05/24/2018    Obstructive sleep apnea on CPAP 02/07/2012    AICD generator infection ([facility]) 08/20/2012    Psoas hematoma, right, secondary to anticoagulant therapy 03/30/2017    Difficult airway for intubation 08/22/2012    History of Coumadin therapy     Impaired mobility and ADLs 03/30/2017    Chronic anemia 09/05/2012    Psoas abscess, right ([facility]) 04/20/2017    Gout     Elevated troponin 12/12/2019    Osteomyelitis ([facility]) 08/16/2018    Diabetic polyneuropathy associated with type 2 diabetes mellitus ([facility]) 03/26/2019    Obesity (BMI 35.0-39.9 without comorbidity) 03/13/2017    Biventricular implantable cardioverter-defibrillator in situ 04/28/2005    Urinary retention 09/17/2019    Wheelchair dependence 03/05/2020    Heart failure with mildly reduced ejection fraction (HFmrEF) ([facility])     Obesity (BMI 30.0-34.9) 05/04/2018    Diabetic neuropathy associated with type 2 diabetes mellitus ([facility]) 06/28/2011    Neuropathic ulcer of heel, right, with fat layer exposed ([facility]) 03/05/2020    Iliopsoas muscle hematoma 03/30/2017    Systolic CHF, acute on chronic (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) 09/05/2012    Nonischemic cardiomyopathy ([facility]) 06/28/2011    Sepsis ([facility]) 04/20/2017    Rivera catheter in place on admission 04/20/2017    Group B streptococcal infection  04/20/2017    Type 2 diabetes mellitus with diabetic polyneuropathy, with long-term current use of insulin ([facility]) 03/12/2018    History of pyelonephritis 03/30/2017    Acute paraplegia ([facility]) 04/20/2017    Foot osteomyelitis, right ([facility]) 08/16/2018    Type 2 diabetes mellitus with diabetic neuropathy ([facility]) 06/28/2011    Hypervolemia 06/06/2017    Non-healing wound of right heel 03/26/2019    Biventricular cardiac pacemaker in situ 09/13/2017    History of complete heart block 06/28/2011    Neurogenic bladder 10/12/2017    Pressure injury of skin of left heel 06/29/2023    MDRO (multiple drug resistant organisms) resistance 08/16/2012    Proteinuria 03/29/2021     Resolved Ambulatory Problems     Diagnosis Date Noted    Atherosclerosis of native arteries of extremities with gangrene, left leg ([facility]) 06/29/2023     Past Medical History:   Diagnosis Date    Abnormal nuclear cardiac imaging test 06/19/2015    Chronic systolic heart failure ([facility])     DVT (deep venous thrombosis) ([facility]) 08/27/2012    DVT (deep venous thrombosis) ([facility]) 09/04/2012    History of deep venous thrombosis 9/5/2012    History of echocardiogram 06/23/2015    S/P cardiac cath 08/15/1996        Cl Score: 11/23   BMI: Body mass index is 39.94 kg/m².   Preventive measures in place: limited layers, turning, heel boots, silicone dressing to sacrum and ischium (unable to apply to heels due to dressings required), Glide sheet    Assessment:   Patient found reclined.  Patient is Awake and alert, Oriented x person, place, time and situation, and Pleasant and conversant.     Wound(s) Description:           Wound 07/12/24 Thigh Posterior;Proximal;Right skin tear (Active)   Wound Image   07/12/24 1228   Wound Etiology Traumatic 07/12/24 1228   Dressing Status Reinforced dressing 07/12/24 1228   Dressing/Treatment Silicone pad 07/12/24 1228   Wound Length (cm) 0.5 cm 07/12/24 1228   Wound Width (cm) 1 cm 07/12/24 1228   Wound Surface Area (cm^2) 0.5 cm^2

## 2024-07-12 NOTE — PROGRESS NOTES
mmol/L    Anion Gap 8 3.0 - 18 mmol/L    Glucose 231 (H) 74 - 99 mg/dL    BUN 67 (H) 7.0 - 18 MG/DL    Creatinine 2.25 (H) 0.6 - 1.3 MG/DL    BUN/Creatinine Ratio 30 (H) 12 - 20      Est, Glom Filt Rate 22 (L) >60 ml/min/1.73m2    Calcium 9.1 8.5 - 10.1 MG/DL    Total Bilirubin 0.6 0.2 - 1.0 MG/DL    ALT 28 13 - 56 U/L    AST 20 10 - 38 U/L    Alk Phosphatase 109 45 - 117 U/L    Total Protein 6.9 6.4 - 8.2 g/dL    Albumin 2.6 (L) 3.4 - 5.0 g/dL    Globulin 4.3 (H) 2.0 - 4.0 g/dL    Albumin/Globulin Ratio 0.6 (L) 0.8 - 1.7     CBC with Auto Differential    Collection Time: 07/12/24  1:56 AM   Result Value Ref Range    WBC 5.8 4.6 - 13.2 K/uL    RBC 3.07 (L) 4.20 - 5.30 M/uL    Hemoglobin 8.1 (L) 12.0 - 16.0 g/dL    Hematocrit 24.5 (L) 35.0 - 45.0 %    MCV 79.8 78.0 - 100.0 FL    MCH 26.4 24.0 - 34.0 PG    MCHC 33.1 31.0 - 37.0 g/dL    RDW 17.2 (H) 11.6 - 14.5 %    Platelets 248 135 - 420 K/uL    MPV 9.8 9.2 - 11.8 FL    Nucleated RBCs 0.0 0  WBC    nRBC 0.00 0.00 - 0.01 K/uL    Neutrophils % 52 40 - 73 %    Lymphocytes % 32 21 - 52 %    Monocytes % 9 3 - 10 %    Eosinophils % 6 (H) 0 - 5 %    Basophils % 1 0 - 2 %    Immature Granulocytes % 0 0.0 - 0.5 %    Neutrophils Absolute 3.0 1.8 - 8.0 K/UL    Lymphocytes Absolute 1.8 0.9 - 3.6 K/UL    Monocytes Absolute 0.5 0.05 - 1.2 K/UL    Eosinophils Absolute 0.4 0.0 - 0.4 K/UL    Basophils Absolute 0.0 0.0 - 0.1 K/UL    Immature Granulocytes Absolute 0.0 0.00 - 0.04 K/UL    Differential Type AUTOMATED     Magnesium    Collection Time: 07/12/24  1:56 AM   Result Value Ref Range    Magnesium 1.8 1.6 - 2.6 mg/dL   Phosphorus    Collection Time: 07/12/24  1:56 AM   Result Value Ref Range    Phosphorus 4.3 2.5 - 4.9 MG/DL   POCT Glucose    Collection Time: 07/12/24  8:02 AM   Result Value Ref Range    POC Glucose 260 (H) 70 - 110 mg/dL   POCT Glucose    Collection Time: 07/12/24 11:39 AM   Result Value Ref Range    POC Glucose 242 (H) 70 - 110 mg/dL       Imaging:  XR  CHEST PORTABLE    Result Date: 7/10/2024  Procedure: Portable AP chest Indication: Cough Comparison: December 12, 2019 Findings: Lungs are clear. The heart is moderately enlarged in size. There our epicardial leads noted. Prior median sternotomy. No acute osseous abnormality.     1. No acute pulmonary process. Thank you for this referral. Electronically signed by Nikolas Richards      Assessment/Plan:     Acute on chronic heart failure with mildly reduced ejection fraction  2D echo from May 2024 revealed an EF of 45%.  The patient has acute on chronic heart failure with mildly reduced ejection fraction due to nonischemic cardiomyopathy.  Continue patient on a bumetanide infusion for diuresis  Monitor intake and output.  Measure weight on a daily basis.  Continue low-sodium diet  Continue carvedilol and atorvastatin.  Hold Entresto since the patient's renal function is at risk of deterioration from bumetanide.  Cardiology and nephrology input appreciated  Continue cardiac monitoring    2.  Acute respiratory failure with hypoxia  Acute respiratory failure is secondary to CHF exacerbation; continue management of underlying etiology  Continue supplemental oxygen to keep oxygen saturation between 93% to 96%  Continue apixaban for VTE prophylaxis    3.  Stage IV chronic kidney disease  Could be secondary to diabetic nephropathy; renal function is stable-continue to monitor  Nephrology input appreciated    4.  Type 2 diabetes mellitus with hyperglycemia  The dose of Lantus and scheduled lispro has been increased.  Continue correction dose insulin.  Continue carbohydrate controlled diet  Hemoglobin A1c 6.8%, reflecting adequate long-term glycemic control    5.  History of complete heart block  S/p biventricular pacemaker placement; the patient is scheduled to have the battery replaced later this month but the patient would like to know if it can be replaced during this hospitalization; will discuss with

## 2024-07-12 NOTE — PROGRESS NOTES
[facility] - Progress Note    Admit Date: 7/10/2024  Attending Cardiologist: Dr. Ross    Assessment:     Patient Active Problem List    Diagnosis Date Noted    CKD (chronic kidney disease), stage IV ([facility]) 07/11/2024    Anemia of chronic disease 07/11/2024    Acute respiratory failure with hypoxia ([facility]) 07/11/2024    Pressure injury of skin of left heel 06/29/2023    Proteinuria 03/29/2021    Wheelchair dependence 03/05/2020    Neuropathic ulcer of heel, right, with fat layer exposed ([facility]) 03/05/2020    Systolic CHF, acute on chronic ([facility]) 12/13/2019    Elevated troponin 12/12/2019    Dyspnea 12/12/2019    Urinary retention 09/17/2019    Diabetic polyneuropathy associated with type 2 diabetes mellitus ([facility]) 03/26/2019    Non-healing wound of right heel 03/26/2019    Infected wound 12/27/2018    Heel ulcer ([facility]) 10/17/2018    Osteomyelitis ([facility]) 08/16/2018    Foot osteomyelitis, right ([facility]) 08/16/2018    Class 3 severe obesity with body mass index (BMI) of 40.0 to 44.9 in adult ([facility]) 05/24/2018    Obesity (BMI 30.0-34.9) 05/04/2018    Type 2 diabetes mellitus with diabetic polyneuropathy, with long-term current use of insulin ([facility]) 03/12/2018    Neurogenic bladder 10/12/2017    Biventricular cardiac pacemaker in situ 09/13/2017    Hypervolemia 06/06/2017    Psoas abscess, right ([facility]) 04/20/2017    Urinary tract infection due to Enterococcus 04/20/2017    Sepsis ([facility]) 04/20/2017    Rivera catheter in place on admission 04/20/2017    Group B streptococcal infection 04/20/2017    Acute paraplegia ([facility]) 04/20/2017    History of Coumadin therapy     Gout     Heart failure with mildly reduced ejection fraction (HFmrEF) ([facility])     Psoas hematoma, right, secondary to anticoagulant therapy 03/30/2017    Impaired mobility and ADLs 03/30/2017    Iliopsoas muscle hematoma 03/30/2017    Decreased calculated glomerular filtration rate (GFR) 03/30/2017    History of pyelonephritis 03/30/2017    Obesity (BMI 35.0-39.9

## 2024-07-12 NOTE — PROGRESS NOTES
RENAL DAILY PROGRESS NOTE              Subjective:       Complaint:   Overnight events noted  no nausea, vomiting, chest pain  Breathing is better    IMPRESSION:   ALEXA due to cardiorenal syndrome  CKD stage 4 due to diabetes,hypertension  Diabetes  Hx diabetic foot,osteomyelitis,heel ulcers s/p calcanectomy 6/4/24.  Controlled hypertension  NICMO   Hx spinal cord injury with chronic bladder retention, s/p supra pubic catheter  Hx diabetic neuropathy   PLAN:    C/w bumex drip for now  I and O  Daily labs  Echo pending  Daily weights           Current Facility-Administered Medications   Medication Dose Route Frequency    perflutren lipid microspheres (DEFINITY) injection 2 mL  2 mL IntraVENous ONCE PRN    acetaminophen (TYLENOL) tablet 500 mg  500 mg Oral Q4H PRN    insulin lispro (HUMALOG,ADMELOG) injection vial 7 Units  7 Units SubCUTAneous TID AC    insulin glargine (LANTUS) injection vial 25 Units  25 Units SubCUTAneous Nightly    cyclobenzaprine (FLEXERIL) tablet 5 mg  5 mg Oral TID PRN    camphor-menthol-methyl salicylate (BENGAY ULTRA STRENGTH) 4-10-30 % cream   Apply externally TID PRN    apixaban (ELIQUIS) tablet 5 mg  5 mg Oral BID    carvedilol (COREG) tablet 6.25 mg  6.25 mg Oral BID WC    hydrOXYzine HCl (ATARAX) tablet 25 mg  25 mg Oral Q6H PRN    methenamine (HIPREX) tablet 1 g (Patient Supplied)  1 g Oral BID WC    multivitamin 1 tablet  1 tablet Oral Daily    sodium chloride flush 0.9 % injection 5-40 mL  5-40 mL IntraVENous 2 times per day    sodium chloride flush 0.9 % injection 5-40 mL  5-40 mL IntraVENous PRN    0.9 % sodium chloride infusion   IntraVENous PRN    ondansetron (ZOFRAN-ODT) disintegrating tablet 4 mg  4 mg Oral Q8H PRN    Or    ondansetron (ZOFRAN) injection 4 mg  4 mg IntraVENous Q6H PRN    polyethylene glycol (GLYCOLAX) packet 17 g  17 g Oral Daily PRN    glucose chewable tablet 16 g  4 tablet Oral PRN    dextrose bolus 10% 125 mL  125 mL IntraVENous PRN    Or       WBC 6.5 8.8 5.8 5.8   HGB 8.4* 8.6* 7.7* 8.1*   HCT 24.9* 25.4* 23.2* 24.5*     Chemistry:   Recent Labs     07/09/24  2153 07/10/24  0755 07/11/24  0314 07/11/24  1833 07/12/24  0156   BUN 64* 64* 62*  --  67*  66*   K 3.9 5.5 3.6 3.6 3.6  3.6    136 140  --  141  141    103 105  --  105  105   CO2 27 23 27  --  28  28   PHOS  --   --  3.8  --  4.3  4.4            Procedures/imaging: see electronic medical records for all procedures, Xrays and details which were not copied into this note but were reviewed prior to creation of Plan          Assessment & Plan:             PETE PUENTES MD  7/12/2024  11:59 AM

## 2024-07-13 LAB
ANION GAP SERPL CALC-SCNC: 9 MMOL/L (ref 3–18)
BUN SERPL-MCNC: 63 MG/DL (ref 7–18)
BUN/CREAT SERPL: 29 (ref 12–20)
CALCIUM SERPL-MCNC: 9.4 MG/DL (ref 8.5–10.1)
CHLORIDE SERPL-SCNC: 105 MMOL/L (ref 100–111)
CO2 SERPL-SCNC: 29 MMOL/L (ref 21–32)
CREAT SERPL-MCNC: 2.19 MG/DL (ref 0.6–1.3)
ECHO BSA: 2.34 M2
ECHO EST RA PRESSURE: 8 MMHG
ECHO LA DIAMETER INDEX: 1.83 CM/M2
ECHO LA DIAMETER: 4.1 CM
ECHO LA VOL A-L A2C: 49 ML (ref 22–52)
ECHO LA VOL A-L A4C: 54 ML (ref 22–52)
ECHO LA VOL BP: 54 ML (ref 22–52)
ECHO LA VOL MOD A2C: 47 ML (ref 22–52)
ECHO LA VOL MOD A4C: 52 ML (ref 22–52)
ECHO LA VOL/BSA BIPLANE: 24 ML/M2 (ref 16–34)
ECHO LA VOLUME AREA LENGTH: 57 ML
ECHO LA VOLUME INDEX A-L A2C: 22 ML/M2 (ref 16–34)
ECHO LA VOLUME INDEX A-L A4C: 24 ML/M2 (ref 16–34)
ECHO LA VOLUME INDEX AREA LENGTH: 25 ML/M2 (ref 16–34)
ECHO LA VOLUME INDEX MOD A2C: 21 ML/M2 (ref 16–34)
ECHO LA VOLUME INDEX MOD A4C: 23 ML/M2 (ref 16–34)
ECHO LV FRACTIONAL SHORTENING: 22 % (ref 28–44)
ECHO LV INTERNAL DIMENSION DIASTOLE INDEX: 2.19 CM/M2
ECHO LV INTERNAL DIMENSION DIASTOLIC: 4.9 CM (ref 3.9–5.3)
ECHO LV INTERNAL DIMENSION SYSTOLIC INDEX: 1.7 CM/M2
ECHO LV INTERNAL DIMENSION SYSTOLIC: 3.8 CM
ECHO LV IVSD: 1 CM (ref 0.6–0.9)
ECHO LV MASS 2D: 188.1 G (ref 67–162)
ECHO LV MASS INDEX 2D: 84 G/M2 (ref 43–95)
ECHO LV POSTERIOR WALL DIASTOLIC: 1.1 CM (ref 0.6–0.9)
ECHO LV RELATIVE WALL THICKNESS RATIO: 0.45
ECHO RA VOLUME BIPLANE METHOD OF DISKS: 38 ML
ECHO RA VOLUME INDEX BP: 17 ML/M2
ECHO RA VOLUME: 38 ML
ECHO RA VOLUME: 40 ML
ECHO RIGHT VENTRICULAR SYSTOLIC PRESSURE (RVSP): 23 MMHG
ECHO RV BASAL DIMENSION: 3.3 CM
ECHO RV TAPSE: 2 CM (ref 1.7–?)
ECHO TV REGURGITANT MAX VELOCITY: 1.93 M/S
ECHO TV REGURGITANT PEAK GRADIENT: 15 MMHG
ERYTHROCYTE [DISTWIDTH] IN BLOOD BY AUTOMATED COUNT: 17.2 % (ref 11.6–14.5)
GLUCOSE BLD STRIP.AUTO-MCNC: 149 MG/DL (ref 70–110)
GLUCOSE BLD STRIP.AUTO-MCNC: 186 MG/DL (ref 70–110)
GLUCOSE BLD STRIP.AUTO-MCNC: 230 MG/DL (ref 70–110)
GLUCOSE BLD STRIP.AUTO-MCNC: 81 MG/DL (ref 70–110)
GLUCOSE BLD STRIP.AUTO-MCNC: 99 MG/DL (ref 70–110)
GLUCOSE SERPL-MCNC: 169 MG/DL (ref 74–99)
HCT VFR BLD AUTO: 26.1 % (ref 35–45)
HGB BLD-MCNC: 8.4 G/DL (ref 12–16)
MAGNESIUM SERPL-MCNC: 1.8 MG/DL (ref 1.6–2.6)
MCH RBC QN AUTO: 26.4 PG (ref 24–34)
MCHC RBC AUTO-ENTMCNC: 32.2 G/DL (ref 31–37)
MCV RBC AUTO: 82.1 FL (ref 78–100)
NRBC # BLD: 0 K/UL (ref 0–0.01)
NRBC BLD-RTO: 0 PER 100 WBC
PLATELET # BLD AUTO: 255 K/UL (ref 135–420)
PMV BLD AUTO: 9.9 FL (ref 9.2–11.8)
POTASSIUM SERPL-SCNC: 3.5 MMOL/L (ref 3.5–5.5)
RBC # BLD AUTO: 3.18 M/UL (ref 4.2–5.3)
SODIUM SERPL-SCNC: 143 MMOL/L (ref 136–145)
WBC # BLD AUTO: 6.3 K/UL (ref 4.6–13.2)

## 2024-07-13 PROCEDURE — 93321 DOPPLER ECHO F-UP/LMTD STD: CPT | Performed by: INTERNAL MEDICINE

## 2024-07-13 PROCEDURE — 2700000000 HC OXYGEN THERAPY PER DAY

## 2024-07-13 PROCEDURE — 6370000000 HC RX 637 (ALT 250 FOR IP): Performed by: HOSPITALIST

## 2024-07-13 PROCEDURE — 6370000000 HC RX 637 (ALT 250 FOR IP): Performed by: STUDENT IN AN ORGANIZED HEALTH CARE EDUCATION/TRAINING PROGRAM

## 2024-07-13 PROCEDURE — 6360000002 HC RX W HCPCS: Performed by: HOSPITALIST

## 2024-07-13 PROCEDURE — 82962 GLUCOSE BLOOD TEST: CPT

## 2024-07-13 PROCEDURE — 99232 SBSQ HOSP IP/OBS MODERATE 35: CPT | Performed by: EMERGENCY MEDICINE

## 2024-07-13 PROCEDURE — 99233 SBSQ HOSP IP/OBS HIGH 50: CPT | Performed by: INTERNAL MEDICINE

## 2024-07-13 PROCEDURE — 93325 DOPPLER ECHO COLOR FLOW MAPG: CPT | Performed by: INTERNAL MEDICINE

## 2024-07-13 PROCEDURE — 94761 N-INVAS EAR/PLS OXIMETRY MLT: CPT

## 2024-07-13 PROCEDURE — 6370000000 HC RX 637 (ALT 250 FOR IP): Performed by: INTERNAL MEDICINE

## 2024-07-13 PROCEDURE — 85027 COMPLETE CBC AUTOMATED: CPT

## 2024-07-13 PROCEDURE — 36415 COLL VENOUS BLD VENIPUNCTURE: CPT

## 2024-07-13 PROCEDURE — 83735 ASSAY OF MAGNESIUM: CPT

## 2024-07-13 PROCEDURE — 93308 TTE F-UP OR LMTD: CPT | Performed by: INTERNAL MEDICINE

## 2024-07-13 PROCEDURE — 80048 BASIC METABOLIC PNL TOTAL CA: CPT

## 2024-07-13 PROCEDURE — 2580000003 HC RX 258: Performed by: HOSPITALIST

## 2024-07-13 PROCEDURE — 1100000000 HC RM PRIVATE

## 2024-07-13 RX ORDER — GUAIFENESIN 600 MG/1
600 TABLET, EXTENDED RELEASE ORAL 2 TIMES DAILY
Status: DISCONTINUED | OUTPATIENT
Start: 2024-07-13 | End: 2024-07-19 | Stop reason: HOSPADM

## 2024-07-13 RX ADMIN — INSULIN LISPRO 2 UNITS: 100 INJECTION, SOLUTION INTRAVENOUS; SUBCUTANEOUS at 12:00

## 2024-07-13 RX ADMIN — BUMETANIDE 1 MG/HR: 0.25 INJECTION INTRAMUSCULAR; INTRAVENOUS at 16:31

## 2024-07-13 RX ADMIN — APIXABAN 5 MG: 5 TABLET, FILM COATED ORAL at 08:06

## 2024-07-13 RX ADMIN — METHENAMINE HIPPURATE 1 G: 1000 TABLET ORAL at 08:05

## 2024-07-13 RX ADMIN — METHENAMINE HIPPURATE 1 G: 1000 TABLET ORAL at 16:27

## 2024-07-13 RX ADMIN — SODIUM CHLORIDE, PRESERVATIVE FREE 10 ML: 5 INJECTION INTRAVENOUS at 19:41

## 2024-07-13 RX ADMIN — APIXABAN 5 MG: 5 TABLET, FILM COATED ORAL at 19:41

## 2024-07-13 RX ADMIN — GUAIFENESIN 600 MG: 600 TABLET, EXTENDED RELEASE ORAL at 19:41

## 2024-07-13 RX ADMIN — INSULIN LISPRO 10 UNITS: 100 INJECTION, SOLUTION INTRAVENOUS; SUBCUTANEOUS at 08:07

## 2024-07-13 RX ADMIN — INSULIN LISPRO 10 UNITS: 100 INJECTION, SOLUTION INTRAVENOUS; SUBCUTANEOUS at 16:23

## 2024-07-13 RX ADMIN — ATORVASTATIN CALCIUM 80 MG: 40 TABLET, FILM COATED ORAL at 08:06

## 2024-07-13 RX ADMIN — CARVEDILOL 6.25 MG: 6.25 TABLET, FILM COATED ORAL at 16:24

## 2024-07-13 RX ADMIN — CARVEDILOL 6.25 MG: 6.25 TABLET, FILM COATED ORAL at 08:06

## 2024-07-13 RX ADMIN — INSULIN LISPRO 10 UNITS: 100 INJECTION, SOLUTION INTRAVENOUS; SUBCUTANEOUS at 12:00

## 2024-07-13 RX ADMIN — THERA TABS 1 TABLET: TAB at 08:06

## 2024-07-13 RX ADMIN — SODIUM CHLORIDE, PRESERVATIVE FREE 10 ML: 5 INJECTION INTRAVENOUS at 08:07

## 2024-07-13 RX ADMIN — POLYETHYLENE GLYCOL 3350 17 G: 17 POWDER, FOR SOLUTION ORAL at 14:03

## 2024-07-13 RX ADMIN — BUMETANIDE 1 MG/HR: 0.25 INJECTION INTRAMUSCULAR; INTRAVENOUS at 23:36

## 2024-07-13 RX ADMIN — BUMETANIDE 1 MG/HR: 0.25 INJECTION INTRAMUSCULAR; INTRAVENOUS at 05:13

## 2024-07-13 RX ADMIN — GUAIFENESIN 600 MG: 600 TABLET, EXTENDED RELEASE ORAL at 11:59

## 2024-07-13 ASSESSMENT — PAIN SCALES - GENERAL
PAINLEVEL_OUTOF10: 0

## 2024-07-13 NOTE — PLAN OF CARE
Problem: Pain  Goal: Verbalizes/displays adequate comfort level or baseline comfort level  Outcome: Progressing  Flowsheets  Taken 7/13/2024 1115 by Tila Ramires, RN  Verbalizes/displays adequate comfort level or baseline comfort level:   Encourage patient to monitor pain and request assistance   Assess pain using appropriate pain scale   Administer analgesics based on type and severity of pain and evaluate response   Implement non-pharmacological measures as appropriate and evaluate response   Consider cultural and social influences on pain and pain management  Taken 7/12/2024 2300 by Luz Macias RN  Verbalizes/displays adequate comfort level or baseline comfort level: Encourage patient to monitor pain and request assistance     Problem: Skin/Tissue Integrity  Goal: Absence of new skin breakdown  Description: 1.  Monitor for areas of redness and/or skin breakdown  2.  Assess vascular access sites hourly  3.  Every 4-6 hours minimum:  Change oxygen saturation probe site  4.  Every 4-6 hours:  If on nasal continuous positive airway pressure, respiratory therapy assess nares and determine need for appliance change or resting period.  Outcome: Progressing     Problem: ABCDS Injury Assessment  Goal: Absence of physical injury  Outcome: Progressing     Problem: Safety - Adult  Goal: Free from fall injury  Outcome: Progressing     Problem: Chronic Conditions and Co-morbidities  Goal: Patient's chronic conditions and co-morbidity symptoms are monitored and maintained or improved  Outcome: Progressing  Flowsheets (Taken 7/13/2024 0830)  Care Plan - Patient's Chronic Conditions and Co-Morbidity Symptoms are Monitored and Maintained or Improved: Monitor and assess patient's chronic conditions and comorbid symptoms for stability, deterioration, or improvement

## 2024-07-13 NOTE — PROGRESS NOTES
[facility] - Progress Note    Admit Date: 7/10/2024  Attending Cardiologist: Dr. Ross    Assessment:     Patient Active Problem List    Diagnosis Date Noted    CKD (chronic kidney disease), stage IV ([facility]) 07/11/2024    Anemia of chronic disease 07/11/2024    Acute respiratory failure with hypoxia ([facility]) 07/11/2024    Pressure injury of skin of left heel 06/29/2023    Proteinuria 03/29/2021    Wheelchair dependence 03/05/2020    Neuropathic ulcer of heel, right, with fat layer exposed ([facility]) 03/05/2020    Systolic CHF, acute on chronic ([facility]) 12/13/2019    Elevated troponin 12/12/2019    Dyspnea 12/12/2019    Urinary retention 09/17/2019    Diabetic polyneuropathy associated with type 2 diabetes mellitus ([facility]) 03/26/2019    Non-healing wound of right heel 03/26/2019    Infected wound 12/27/2018    Heel ulcer ([facility]) 10/17/2018    Osteomyelitis ([facility]) 08/16/2018    Foot osteomyelitis, right ([facility]) 08/16/2018    Class 3 severe obesity with body mass index (BMI) of 40.0 to 44.9 in adult ([facility]) 05/24/2018    Obesity (BMI 30.0-34.9) 05/04/2018    Type 2 diabetes mellitus with diabetic polyneuropathy, with long-term current use of insulin ([facility]) 03/12/2018    Neurogenic bladder 10/12/2017    Biventricular cardiac pacemaker in situ 09/13/2017    Hypervolemia 06/06/2017    Psoas abscess, right ([facility]) 04/20/2017    Urinary tract infection due to Enterococcus 04/20/2017    Sepsis ([facility]) 04/20/2017    Rivera catheter in place on admission 04/20/2017    Group B streptococcal infection 04/20/2017    Acute paraplegia ([facility]) 04/20/2017    History of Coumadin therapy     Gout     Heart failure with mildly reduced ejection fraction (HFmrEF) ([facility])     Psoas hematoma, right, secondary to anticoagulant therapy 03/30/2017    Impaired mobility and ADLs 03/30/2017    Iliopsoas muscle hematoma 03/30/2017    Decreased calculated glomerular filtration rate (GFR) 03/30/2017    History of pyelonephritis 03/30/2017    Obesity (BMI 35.0-39.9    Medication Dose Route Frequency    insulin lispro (HUMALOG,ADMELOG) injection vial 10 Units  10 Units SubCUTAneous TID AC    insulin glargine (LANTUS) injection vial 30 Units  30 Units SubCUTAneous Nightly    acetaminophen (TYLENOL) tablet 500 mg  500 mg Oral Q4H PRN    cyclobenzaprine (FLEXERIL) tablet 5 mg  5 mg Oral TID PRN    camphor-menthol-methyl salicylate (BENGAY ULTRA STRENGTH) 4-10-30 % cream   Apply externally TID PRN    apixaban (ELIQUIS) tablet 5 mg  5 mg Oral BID    carvedilol (COREG) tablet 6.25 mg  6.25 mg Oral BID WC    hydrOXYzine HCl (ATARAX) tablet 25 mg  25 mg Oral Q6H PRN    methenamine (HIPREX) tablet 1 g (Patient Supplied)  1 g Oral BID WC    multivitamin 1 tablet  1 tablet Oral Daily    sodium chloride flush 0.9 % injection 5-40 mL  5-40 mL IntraVENous 2 times per day    sodium chloride flush 0.9 % injection 5-40 mL  5-40 mL IntraVENous PRN    0.9 % sodium chloride infusion   IntraVENous PRN    ondansetron (ZOFRAN-ODT) disintegrating tablet 4 mg  4 mg Oral Q8H PRN    Or    ondansetron (ZOFRAN) injection 4 mg  4 mg IntraVENous Q6H PRN    polyethylene glycol (GLYCOLAX) packet 17 g  17 g Oral Daily PRN    glucose chewable tablet 16 g  4 tablet Oral PRN    dextrose bolus 10% 125 mL  125 mL IntraVENous PRN    Or    dextrose bolus 10% 250 mL  250 mL IntraVENous PRN    glucagon (rDNA) injection 1 mg  1 mg SubCUTAneous PRN    dextrose 10 % infusion   IntraVENous Continuous PRN    insulin lispro (HUMALOG,ADMELOG) injection vial 0-8 Units  0-8 Units SubCUTAneous TID WC    insulin lispro (HUMALOG,ADMELOG) injection vial 0-4 Units  0-4 Units SubCUTAneous Nightly    bumetanide (BUMEX) 12.5 mg in 50 mL infusion  1 mg/hr IntraVENous Continuous    atorvastatin (LIPITOR) tablet 80 mg  80 mg Oral Daily         Intake/Output Summary (Last 24 hours) at 7/13/2024 1055  Last data filed at 7/13/2024 0120  Gross per 24 hour   Intake 240 ml   Output 2250 ml   Net -2010 ml       Physical Exam:  General:  alert,

## 2024-07-13 NOTE — PROGRESS NOTES
[facility]  Progress Note    Patient: Meggan Rossi Age: 73 y.o. : 1950 MR#: [id number] SSN: [id number]  Date: 2024             DVT Prophylaxis:  []Lovenox  []Hep SQ  []SCDs  []Coumadin   []On Heparin gtt [x]PO anticoagulant    Anticipated discharge: 2024    Subjective:     I personally saw and evaluated this patient face-to-face today.  Patient is sitting in bed in no apparent distress, awake and alert.  Complains of dyspnea on exertion    Objective:   VS: /74   Pulse 67   Temp 97.9 °F (36.6 °C) (Oral)   Resp 18   Ht 1.702 m (5' 7\")   Wt 115.7 kg (255 lb)   LMP  (LMP Unknown)   SpO2 99%   BMI 39.94 kg/m²    Tmax/24hrs: Temp (24hrs), Av.2 °F (36.8 °C), Min:97.9 °F (36.6 °C), Max:99 °F (37.2 °C)    Intake/Output Summary (Last 24 hours) at 2024 1554  Last data filed at 2024 0120  Gross per 24 hour   Intake 240 ml   Output 2250 ml   Net -2010 ml        PHYSICAL EXAM  General:  Awake, alert  Cardiovascular:  S1S2+, RRR  Pulmonary: Decreased breath sounds bilateral bases  GI:  Soft, BS+, NT, ND  Extremities:  2+ edema      Current Facility-Administered Medications   Medication Dose Route Frequency    guaiFENesin (MUCINEX) extended release tablet 600 mg  600 mg Oral BID    insulin lispro (HUMALOG,ADMELOG) injection vial 10 Units  10 Units SubCUTAneous TID AC    insulin glargine (LANTUS) injection vial 30 Units  30 Units SubCUTAneous Nightly    acetaminophen (TYLENOL) tablet 500 mg  500 mg Oral Q4H PRN    cyclobenzaprine (FLEXERIL) tablet 5 mg  5 mg Oral TID PRN    camphor-menthol-methyl salicylate (BENGAY ULTRA STRENGTH) 4-10-30 % cream   Apply externally TID PRN    apixaban (ELIQUIS) tablet 5 mg  5 mg Oral BID    carvedilol (COREG) tablet 6.25 mg  6.25 mg Oral BID WC    hydrOXYzine HCl (ATARAX) tablet 25 mg  25 mg Oral Q6H PRN    methenamine (HIPREX) tablet 1 g (Patient Supplied)  1 g Oral BID WC    multivitamin 1 tablet  1  but the patient would like to know if it can be replaced during this hospitalization; will discuss with cardiology    6.  History of left lower extremity DVT  Continue patient on apixaban    7.  Anemia of chronic disease  Continue to monitor hemoglobin and hematocrit.    8.  Hyperlipidemia  Continue atorvastatin    I discussed the care plan with the patient    Dragon medical dictation software was used for portions of this report. Unintended errors may occur.      Vijay Varghese MD

## 2024-07-13 NOTE — PROGRESS NOTES
[facility] DAILY PROGRESS NOTE              Subjective:       Complaint:   Overnight events noted  no nausea, vomiting, chest pain  Breathing is better  Coughing is bothersome    IMPRESSION:   ALEXA due to cardiorenal syndrome  CKD stage 4 due to diabetes,hypertension  Diabetes  Hx diabetic foot,osteomyelitis,heel ulcers s/p calcanectomy 6/4/24.  Controlled hypertension  NICMO EF 35-40  Hx spinal cord injury with chronic bladder retention, s/p supra pubic catheter  Hx diabetic neuropathy   PLAN:    C/w bumex drip for now  I and O  Daily labs  Daily weights           Current Facility-Administered Medications   Medication Dose Route Frequency    insulin lispro (HUMALOG,ADMELOG) injection vial 10 Units  10 Units SubCUTAneous TID AC    insulin glargine (LANTUS) injection vial 30 Units  30 Units SubCUTAneous Nightly    acetaminophen (TYLENOL) tablet 500 mg  500 mg Oral Q4H PRN    cyclobenzaprine (FLEXERIL) tablet 5 mg  5 mg Oral TID PRN    camphor-menthol-methyl salicylate (BENGAY ULTRA STRENGTH) 4-10-30 % cream   Apply externally TID PRN    apixaban (ELIQUIS) tablet 5 mg  5 mg Oral BID    carvedilol (COREG) tablet 6.25 mg  6.25 mg Oral BID WC    hydrOXYzine HCl (ATARAX) tablet 25 mg  25 mg Oral Q6H PRN    methenamine (HIPREX) tablet 1 g (Patient Supplied)  1 g Oral BID WC    multivitamin 1 tablet  1 tablet Oral Daily    sodium chloride flush 0.9 % injection 5-40 mL  5-40 mL IntraVENous 2 times per day    sodium chloride flush 0.9 % injection 5-40 mL  5-40 mL IntraVENous PRN    0.9 % sodium chloride infusion   IntraVENous PRN    ondansetron (ZOFRAN-ODT) disintegrating tablet 4 mg  4 mg Oral Q8H PRN    Or    ondansetron (ZOFRAN) injection 4 mg  4 mg IntraVENous Q6H PRN    polyethylene glycol (GLYCOLAX) packet 17 g  17 g Oral Daily PRN    glucose chewable tablet 16 g  4 tablet Oral PRN    dextrose bolus 10% 125 mL  125 mL IntraVENous PRN    Or    dextrose bolus 10% 250 mL  250 mL IntraVENous PRN    glucagon (rDNA)

## 2024-07-14 LAB
ANION GAP SERPL CALC-SCNC: 8 MMOL/L (ref 3–18)
BASOPHILS # BLD: 0 K/UL (ref 0–0.1)
BASOPHILS NFR BLD: 1 % (ref 0–2)
BUN SERPL-MCNC: 64 MG/DL (ref 7–18)
BUN/CREAT SERPL: 31 (ref 12–20)
CALCIUM SERPL-MCNC: 9.3 MG/DL (ref 8.5–10.1)
CHLORIDE SERPL-SCNC: 102 MMOL/L (ref 100–111)
CO2 SERPL-SCNC: 29 MMOL/L (ref 21–32)
CREAT SERPL-MCNC: 2.08 MG/DL (ref 0.6–1.3)
DIFFERENTIAL METHOD BLD: ABNORMAL
EOSINOPHIL # BLD: 0.3 K/UL (ref 0–0.4)
EOSINOPHIL NFR BLD: 4 % (ref 0–5)
ERYTHROCYTE [DISTWIDTH] IN BLOOD BY AUTOMATED COUNT: 17.1 % (ref 11.6–14.5)
GLUCOSE BLD STRIP.AUTO-MCNC: 161 MG/DL (ref 70–110)
GLUCOSE BLD STRIP.AUTO-MCNC: 177 MG/DL (ref 70–110)
GLUCOSE BLD STRIP.AUTO-MCNC: 186 MG/DL (ref 70–110)
GLUCOSE BLD STRIP.AUTO-MCNC: 230 MG/DL (ref 70–110)
GLUCOSE SERPL-MCNC: 182 MG/DL (ref 74–99)
HCT VFR BLD AUTO: 27.4 % (ref 35–45)
HGB BLD-MCNC: 8.8 G/DL (ref 12–16)
IMM GRANULOCYTES # BLD AUTO: 0 K/UL (ref 0–0.04)
IMM GRANULOCYTES NFR BLD AUTO: 0 % (ref 0–0.5)
LYMPHOCYTES # BLD: 1.8 K/UL (ref 0.9–3.6)
LYMPHOCYTES NFR BLD: 29 % (ref 21–52)
MCH RBC QN AUTO: 26.1 PG (ref 24–34)
MCHC RBC AUTO-ENTMCNC: 32.1 G/DL (ref 31–37)
MCV RBC AUTO: 81.3 FL (ref 78–100)
MONOCYTES # BLD: 0.5 K/UL (ref 0.05–1.2)
MONOCYTES NFR BLD: 8 % (ref 3–10)
NEUTS SEG # BLD: 3.7 K/UL (ref 1.8–8)
NEUTS SEG NFR BLD: 58 % (ref 40–73)
NRBC # BLD: 0 K/UL (ref 0–0.01)
NRBC BLD-RTO: 0 PER 100 WBC
PLATELET # BLD AUTO: 248 K/UL (ref 135–420)
PMV BLD AUTO: 9.6 FL (ref 9.2–11.8)
POTASSIUM SERPL-SCNC: 3.4 MMOL/L (ref 3.5–5.5)
RBC # BLD AUTO: 3.37 M/UL (ref 4.2–5.3)
SODIUM SERPL-SCNC: 139 MMOL/L (ref 136–145)
WBC # BLD AUTO: 6.4 K/UL (ref 4.6–13.2)

## 2024-07-14 PROCEDURE — 94761 N-INVAS EAR/PLS OXIMETRY MLT: CPT

## 2024-07-14 PROCEDURE — 2580000003 HC RX 258: Performed by: HOSPITALIST

## 2024-07-14 PROCEDURE — 80048 BASIC METABOLIC PNL TOTAL CA: CPT

## 2024-07-14 PROCEDURE — 82962 GLUCOSE BLOOD TEST: CPT

## 2024-07-14 PROCEDURE — 1100000000 HC RM PRIVATE

## 2024-07-14 PROCEDURE — 6360000002 HC RX W HCPCS: Performed by: HOSPITALIST

## 2024-07-14 PROCEDURE — 6370000000 HC RX 637 (ALT 250 FOR IP): Performed by: INTERNAL MEDICINE

## 2024-07-14 PROCEDURE — 6370000000 HC RX 637 (ALT 250 FOR IP): Performed by: HOSPITALIST

## 2024-07-14 PROCEDURE — 36415 COLL VENOUS BLD VENIPUNCTURE: CPT

## 2024-07-14 PROCEDURE — 6370000000 HC RX 637 (ALT 250 FOR IP): Performed by: STUDENT IN AN ORGANIZED HEALTH CARE EDUCATION/TRAINING PROGRAM

## 2024-07-14 PROCEDURE — 2700000000 HC OXYGEN THERAPY PER DAY

## 2024-07-14 PROCEDURE — 85025 COMPLETE CBC W/AUTO DIFF WBC: CPT

## 2024-07-14 PROCEDURE — 99232 SBSQ HOSP IP/OBS MODERATE 35: CPT | Performed by: INTERNAL MEDICINE

## 2024-07-14 RX ORDER — METOLAZONE 2.5 MG/1
2.5 TABLET ORAL ONCE
Status: COMPLETED | OUTPATIENT
Start: 2024-07-14 | End: 2024-07-14

## 2024-07-14 RX ORDER — POTASSIUM CHLORIDE 20 MEQ/1
40 TABLET, EXTENDED RELEASE ORAL DAILY
Status: DISCONTINUED | OUTPATIENT
Start: 2024-07-14 | End: 2024-07-19 | Stop reason: HOSPADM

## 2024-07-14 RX ADMIN — INSULIN LISPRO 10 UNITS: 100 INJECTION, SOLUTION INTRAVENOUS; SUBCUTANEOUS at 08:34

## 2024-07-14 RX ADMIN — INSULIN LISPRO 2 UNITS: 100 INJECTION, SOLUTION INTRAVENOUS; SUBCUTANEOUS at 12:03

## 2024-07-14 RX ADMIN — HYDROXYZINE HYDROCHLORIDE 25 MG: 25 TABLET, FILM COATED ORAL at 02:18

## 2024-07-14 RX ADMIN — METHENAMINE HIPPURATE 1 G: 1000 TABLET ORAL at 16:58

## 2024-07-14 RX ADMIN — CARVEDILOL 6.25 MG: 6.25 TABLET, FILM COATED ORAL at 16:58

## 2024-07-14 RX ADMIN — INSULIN LISPRO 10 UNITS: 100 INJECTION, SOLUTION INTRAVENOUS; SUBCUTANEOUS at 12:03

## 2024-07-14 RX ADMIN — POLYETHYLENE GLYCOL 3350 17 G: 17 POWDER, FOR SOLUTION ORAL at 22:14

## 2024-07-14 RX ADMIN — METHENAMINE HIPPURATE 1 G: 1000 TABLET ORAL at 08:44

## 2024-07-14 RX ADMIN — METOLAZONE 2.5 MG: 2.5 TABLET ORAL at 12:03

## 2024-07-14 RX ADMIN — POTASSIUM CHLORIDE 40 MEQ: 1500 TABLET, EXTENDED RELEASE ORAL at 08:34

## 2024-07-14 RX ADMIN — GUAIFENESIN 600 MG: 600 TABLET, EXTENDED RELEASE ORAL at 22:03

## 2024-07-14 RX ADMIN — INSULIN GLARGINE 30 UNITS: 100 INJECTION, SOLUTION SUBCUTANEOUS at 22:03

## 2024-07-14 RX ADMIN — CARVEDILOL 6.25 MG: 6.25 TABLET, FILM COATED ORAL at 08:34

## 2024-07-14 RX ADMIN — APIXABAN 5 MG: 5 TABLET, FILM COATED ORAL at 22:03

## 2024-07-14 RX ADMIN — APIXABAN 5 MG: 5 TABLET, FILM COATED ORAL at 08:34

## 2024-07-14 RX ADMIN — THERA TABS 1 TABLET: TAB at 08:34

## 2024-07-14 RX ADMIN — INSULIN LISPRO 10 UNITS: 100 INJECTION, SOLUTION INTRAVENOUS; SUBCUTANEOUS at 16:58

## 2024-07-14 RX ADMIN — GUAIFENESIN 600 MG: 600 TABLET, EXTENDED RELEASE ORAL at 08:34

## 2024-07-14 RX ADMIN — BUMETANIDE 1 MG/HR: 0.25 INJECTION INTRAMUSCULAR; INTRAVENOUS at 22:08

## 2024-07-14 RX ADMIN — BUMETANIDE 1 MG/HR: 0.25 INJECTION INTRAMUSCULAR; INTRAVENOUS at 12:09

## 2024-07-14 RX ADMIN — SODIUM CHLORIDE, PRESERVATIVE FREE 10 ML: 5 INJECTION INTRAVENOUS at 08:37

## 2024-07-14 ASSESSMENT — PAIN SCALES - GENERAL
PAINLEVEL_OUTOF10: 0

## 2024-07-14 NOTE — PROGRESS NOTES
[facility]  Progress Note    Patient: Meggan Rossi Age: 73 y.o. : 1950 MR#: [id number] SSN: [id number]  Date: 2024             DVT Prophylaxis:  []Lovenox  []Hep SQ  []SCDs  []Coumadin   []On Heparin gtt [x]PO anticoagulant    Disposition- 1-2 days.  Home with home health care    Subjective:     I personally saw and evaluated this patient face-to-face today.  Patient is sitting in bed in no apparent distress, has dyspnea on exertion.  Daughter and grandson at bedside    Objective:   VS: /69   Pulse 65   Temp 97.6 °F (36.4 °C) (Oral)   Resp 18   Ht 1.702 m (5' 7\")   Wt 112.4 kg (247 lb 12.8 oz)   LMP  (LMP Unknown)   SpO2 100%   BMI 38.81 kg/m²    Tmax/24hrs: Temp (24hrs), Av.9 °F (36.6 °C), Min:97.5 °F (36.4 °C), Max:98.3 °F (36.8 °C)    Intake/Output Summary (Last 24 hours) at 2024 1158  Last data filed at 2024 0801  Gross per 24 hour   Intake --   Output 4200 ml   Net -4200 ml        PHYSICAL EXAM  General:  Awake, alert  Cardiovascular:  S1S2+, RRR  Pulmonary:  CTA b/l  GI:  Soft, BS+, NT, ND  Extremities:  2+ edema        Current Facility-Administered Medications   Medication Dose Route Frequency    potassium chloride (KLOR-CON M) extended release tablet 40 mEq  40 mEq Oral Daily    metOLazone (ZAROXOLYN) tablet 2.5 mg  2.5 mg Oral Once    guaiFENesin (MUCINEX) extended release tablet 600 mg  600 mg Oral BID    insulin lispro (HUMALOG,ADMELOG) injection vial 10 Units  10 Units SubCUTAneous TID AC    insulin glargine (LANTUS) injection vial 30 Units  30 Units SubCUTAneous Nightly    acetaminophen (TYLENOL) tablet 500 mg  500 mg Oral Q4H PRN    cyclobenzaprine (FLEXERIL) tablet 5 mg  5 mg Oral TID PRN    camphor-menthol-methyl salicylate (BENGAY ULTRA STRENGTH) 4-10-30 % cream   Apply externally TID PRN    apixaban (ELIQUIS) tablet 5 mg  5 mg Oral BID    carvedilol (COREG) tablet 6.25 mg  6.25 mg Oral BID WC    hydrOXYzine HCl

## 2024-07-14 NOTE — PLAN OF CARE
Problem: Pain  Goal: Verbalizes/displays adequate comfort level or baseline comfort level  Outcome: Progressing     Problem: Skin/Tissue Integrity  Goal: Absence of new skin breakdown  Description: 1.  Monitor for areas of redness and/or skin breakdown  2.  Assess vascular access sites hourly  3.  Every 4-6 hours minimum:  Change oxygen saturation probe site  4.  Every 4-6 hours:  If on nasal continuous positive airway pressure, respiratory therapy assess nares and determine need for appliance change or resting period.  Outcome: Progressing     Problem: ABCDS Injury Assessment  Goal: Absence of physical injury  Outcome: Progressing     Problem: Safety - Adult  Goal: Free from fall injury  Outcome: Progressing     Problem: Chronic Conditions and Co-morbidities  Goal: Patient's chronic conditions and co-morbidity symptoms are monitored and maintained or improved  Outcome: Progressing

## 2024-07-14 NOTE — PLAN OF CARE
Problem: Pain  Goal: Verbalizes/displays adequate comfort level or baseline comfort level  7/14/2024 1053 by Nilda Mattson RN  Outcome: Progressing  7/14/2024 0740 by Hernando Farooq RN  Outcome: Progressing     Problem: Skin/Tissue Integrity  Goal: Absence of new skin breakdown  Description: 1.  Monitor for areas of redness and/or skin breakdown  2.  Assess vascular access sites hourly  3.  Every 4-6 hours minimum:  Change oxygen saturation probe site  4.  Every 4-6 hours:  If on nasal continuous positive airway pressure, respiratory therapy assess nares and determine need for appliance change or resting period.  7/14/2024 1053 by Nilda Mattson RN  Outcome: Progressing  7/14/2024 0740 by Hernando Farooq RN  Outcome: Progressing     Problem: ABCDS Injury Assessment  Goal: Absence of physical injury  7/14/2024 1053 by Nilda Mattson RN  Outcome: Progressing  7/14/2024 0740 by Hernando Farooq RN  Outcome: Progressing     Problem: Safety - Adult  Goal: Free from fall injury  7/14/2024 1053 by Nilda Mattson RN  Outcome: Progressing  Flowsheets (Taken 7/14/2024 0803)  Free From Fall Injury: Instruct family/caregiver on patient safety  7/14/2024 0740 by Hernando Farooq RN  Outcome: Progressing     Problem: Chronic Conditions and Co-morbidities  Goal: Patient's chronic conditions and co-morbidity symptoms are monitored and maintained or improved  7/14/2024 1053 by Nilda Mattson RN  Outcome: Progressing  Flowsheets (Taken 7/14/2024 0803)  Care Plan - Patient's Chronic Conditions and Co-Morbidity Symptoms are Monitored and Maintained or Improved:   Monitor and assess patient's chronic conditions and comorbid symptoms for stability, deterioration, or improvement   Collaborate with multidisciplinary team to address chronic and comorbid conditions and prevent exacerbation or deterioration  7/14/2024 0740 by Hernando Farooq RN  Outcome: Progressing

## 2024-07-14 NOTE — PROGRESS NOTES
[facility]  -  Progress Note      Patient: Meggan Rossi MRN: [id number]  SSN: [id number]    YOB: 1950  Age: 73 y.o.  Sex: female      Admit Date: 7/10/2024    Assessment:     - Acute on chronic HFmrEF: Presented with shortness of breath and  bilateral lower extremity edema. Nt probnp  1330. Patient with significant diuresis on bumex gtt.  - Nonischemic Dilated Cardiomyopathy: Echo 7/12/24 EF 35-40% global hypokinesis. Echo 5/31/2024 EF ~ 45%, abnormal septal motion consistent w/ paced rhythm, normal diastolic function.  EF has been as low as 25%. [facility] 8/1996 with widely patent coronary arteries. On bumex and coreg as outpatient. GDMT limited due to CKD.  - CHB s/p BIV PPM: Device nearing elective replacement time. Scheduled to have device replaced 7/24/24. Device check 7/12/24 by MDT rep; Normal device function,  no significant arrhythmias, less than one month remaining until device reaches elective replacement time. Of note, device has been implanted in abdomen.  - ALEXA on CKD4, primary nephrologist DR. KINGSLEY Doan. Creatinine on admission 2.3, improving  - HTN; normotensive   - T2DM  - History of chronic, nonocclusive left common femoral vein DVT, on Eliquis. Most recent PVL 7/12/2024 with no evidence of acute deep or supervision vein thrombosis  - History of CVA with right sided deficits. Patient is bedbound. On statin   - Recent osteomyelitis, bilateral heel wounds, status post debridement, and left partial calcanectomy 6/4/2024. Antibiotic course completed.    - History of chronic bladder retention from spinal cord injury, status post suprapubic catheter placement 2023. Followed by urology as outpatient     Primary cardiologist: Dr. Guzmán    Plan:     Patient continues to diurese well.  Not yet ready or able to have generator change due to orthopnea.  Would continue to diurese.  Once able to lay flat for 60-90 minutes, will try to arrange anesthesia and possible generator

## 2024-07-14 NOTE — PROGRESS NOTES
New OT order received and chart reviewed.  Patient evaluated and discharged from skilled OT caseload on 7/11/2024.  Please see full note for details.  No change in medical/functional status noted.  Will acknowledge/complete the order.  Thank you for the referral.      Lonnie Segura MS, OTR/L

## 2024-07-14 NOTE — PROGRESS NOTES
grossly intact  Extrm: no cyanosis, clubbing . Edema in thigh area. Hyperaesthesia to right leg  Skin: no visible  Rash  Musculoskeletal: No gross joints or bone deformities         Data Review:     LABS:   Hematology:   Recent Labs     07/12/24  0156 07/13/24 0434 07/14/24  0243   WBC 5.8 6.3 6.4   HGB 8.1* 8.4* 8.8*   HCT 24.5* 26.1* 27.4*       Chemistry:   Recent Labs     07/11/24  1833 07/12/24  0156 07/13/24 0434 07/14/24  0243   BUN  --  67*  66* 63* 64*   K 3.6 3.6  3.6 3.5 3.4*   NA  --  141  141 143 139   CL  --  105  105 105 102   CO2  --  28  28 29 29   PHOS  --  4.3  4.4  --   --               Procedures/imaging: see electronic medical records for all procedures, Xrays and details which were not copied into this note but were reviewed prior to creation of Plan          Assessment & Plan:     See above        PETE PUENTES MD  7/14/2024  10:08 AM

## 2024-07-15 PROBLEM — R06.02 SOB (SHORTNESS OF BREATH): Status: ACTIVE | Noted: 2019-12-12

## 2024-07-15 PROBLEM — Z95.0 PACEMAKER: Status: ACTIVE | Noted: 2017-09-13

## 2024-07-15 PROBLEM — R09.02 HYPOXIA: Status: ACTIVE | Noted: 2024-07-15

## 2024-07-15 PROBLEM — R60.0 EDEMA OF LEFT LOWER EXTREMITY: Status: ACTIVE | Noted: 2024-07-15

## 2024-07-15 LAB
ANION GAP SERPL CALC-SCNC: 8 MMOL/L (ref 3–18)
BASOPHILS # BLD: 0.1 K/UL (ref 0–0.1)
BASOPHILS NFR BLD: 1 % (ref 0–2)
BUN SERPL-MCNC: 69 MG/DL (ref 7–18)
BUN/CREAT SERPL: 29 (ref 12–20)
CALCIUM SERPL-MCNC: 9.2 MG/DL (ref 8.5–10.1)
CHLORIDE SERPL-SCNC: 101 MMOL/L (ref 100–111)
CO2 SERPL-SCNC: 30 MMOL/L (ref 21–32)
CREAT SERPL-MCNC: 2.37 MG/DL (ref 0.6–1.3)
DIFFERENTIAL METHOD BLD: ABNORMAL
EOSINOPHIL # BLD: 0.3 K/UL (ref 0–0.4)
EOSINOPHIL NFR BLD: 5 % (ref 0–5)
ERYTHROCYTE [DISTWIDTH] IN BLOOD BY AUTOMATED COUNT: 17.2 % (ref 11.6–14.5)
GLUCOSE BLD STRIP.AUTO-MCNC: 114 MG/DL (ref 70–110)
GLUCOSE BLD STRIP.AUTO-MCNC: 206 MG/DL (ref 70–110)
GLUCOSE BLD STRIP.AUTO-MCNC: 229 MG/DL (ref 70–110)
GLUCOSE BLD STRIP.AUTO-MCNC: 251 MG/DL (ref 70–110)
GLUCOSE SERPL-MCNC: 204 MG/DL (ref 74–99)
HCT VFR BLD AUTO: 27.8 % (ref 35–45)
HGB BLD-MCNC: 9 G/DL (ref 12–16)
IMM GRANULOCYTES # BLD AUTO: 0 K/UL (ref 0–0.04)
IMM GRANULOCYTES NFR BLD AUTO: 0 % (ref 0–0.5)
LYMPHOCYTES # BLD: 1.9 K/UL (ref 0.9–3.6)
LYMPHOCYTES NFR BLD: 30 % (ref 21–52)
MCH RBC QN AUTO: 26.3 PG (ref 24–34)
MCHC RBC AUTO-ENTMCNC: 32.4 G/DL (ref 31–37)
MCV RBC AUTO: 81.3 FL (ref 78–100)
MONOCYTES # BLD: 0.6 K/UL (ref 0.05–1.2)
MONOCYTES NFR BLD: 9 % (ref 3–10)
NEUTS SEG # BLD: 3.4 K/UL (ref 1.8–8)
NEUTS SEG NFR BLD: 55 % (ref 40–73)
NRBC # BLD: 0 K/UL (ref 0–0.01)
NRBC BLD-RTO: 0 PER 100 WBC
PLATELET # BLD AUTO: 252 K/UL (ref 135–420)
PMV BLD AUTO: 10.1 FL (ref 9.2–11.8)
POTASSIUM SERPL-SCNC: 3.7 MMOL/L (ref 3.5–5.5)
RBC # BLD AUTO: 3.42 M/UL (ref 4.2–5.3)
SODIUM SERPL-SCNC: 139 MMOL/L (ref 136–145)
WBC # BLD AUTO: 6.3 K/UL (ref 4.6–13.2)

## 2024-07-15 PROCEDURE — 97162 PT EVAL MOD COMPLEX 30 MIN: CPT

## 2024-07-15 PROCEDURE — 1100000000 HC RM PRIVATE

## 2024-07-15 PROCEDURE — 82962 GLUCOSE BLOOD TEST: CPT

## 2024-07-15 PROCEDURE — 6370000000 HC RX 637 (ALT 250 FOR IP): Performed by: STUDENT IN AN ORGANIZED HEALTH CARE EDUCATION/TRAINING PROGRAM

## 2024-07-15 PROCEDURE — 85025 COMPLETE CBC W/AUTO DIFF WBC: CPT

## 2024-07-15 PROCEDURE — 2580000003 HC RX 258: Performed by: HOSPITALIST

## 2024-07-15 PROCEDURE — 6360000002 HC RX W HCPCS: Performed by: HOSPITALIST

## 2024-07-15 PROCEDURE — 6370000000 HC RX 637 (ALT 250 FOR IP): Performed by: HOSPITALIST

## 2024-07-15 PROCEDURE — 97530 THERAPEUTIC ACTIVITIES: CPT

## 2024-07-15 PROCEDURE — 99232 SBSQ HOSP IP/OBS MODERATE 35: CPT | Performed by: INTERNAL MEDICINE

## 2024-07-15 PROCEDURE — 6360000002 HC RX W HCPCS: Performed by: INTERNAL MEDICINE

## 2024-07-15 PROCEDURE — 80048 BASIC METABOLIC PNL TOTAL CA: CPT

## 2024-07-15 PROCEDURE — 99232 SBSQ HOSP IP/OBS MODERATE 35: CPT | Performed by: HOSPITALIST

## 2024-07-15 PROCEDURE — 6370000000 HC RX 637 (ALT 250 FOR IP): Performed by: INTERNAL MEDICINE

## 2024-07-15 PROCEDURE — 36415 COLL VENOUS BLD VENIPUNCTURE: CPT

## 2024-07-15 RX ORDER — METOLAZONE 2.5 MG/1
TABLET ORAL
Qty: 24 TABLET | Refills: 3 | OUTPATIENT
Start: 2024-07-15

## 2024-07-15 RX ADMIN — INSULIN LISPRO 10 UNITS: 100 INJECTION, SOLUTION INTRAVENOUS; SUBCUTANEOUS at 08:30

## 2024-07-15 RX ADMIN — GUAIFENESIN 600 MG: 600 TABLET, EXTENDED RELEASE ORAL at 20:28

## 2024-07-15 RX ADMIN — INSULIN LISPRO 10 UNITS: 100 INJECTION, SOLUTION INTRAVENOUS; SUBCUTANEOUS at 16:46

## 2024-07-15 RX ADMIN — POTASSIUM CHLORIDE 40 MEQ: 1500 TABLET, EXTENDED RELEASE ORAL at 08:31

## 2024-07-15 RX ADMIN — SODIUM CHLORIDE, PRESERVATIVE FREE 10 ML: 5 INJECTION INTRAVENOUS at 08:33

## 2024-07-15 RX ADMIN — INSULIN LISPRO 2 UNITS: 100 INJECTION, SOLUTION INTRAVENOUS; SUBCUTANEOUS at 11:48

## 2024-07-15 RX ADMIN — BUMETANIDE 1 MG/HR: 0.25 INJECTION INTRAMUSCULAR; INTRAVENOUS at 10:24

## 2024-07-15 RX ADMIN — INSULIN LISPRO 10 UNITS: 100 INJECTION, SOLUTION INTRAVENOUS; SUBCUTANEOUS at 11:48

## 2024-07-15 RX ADMIN — METHENAMINE HIPPURATE 1 G: 1000 TABLET ORAL at 08:35

## 2024-07-15 RX ADMIN — HYDROXYZINE HYDROCHLORIDE 25 MG: 25 TABLET, FILM COATED ORAL at 11:48

## 2024-07-15 RX ADMIN — INSULIN GLARGINE 30 UNITS: 100 INJECTION, SOLUTION SUBCUTANEOUS at 20:50

## 2024-07-15 RX ADMIN — INSULIN LISPRO 4 UNITS: 100 INJECTION, SOLUTION INTRAVENOUS; SUBCUTANEOUS at 08:30

## 2024-07-15 RX ADMIN — CARVEDILOL 6.25 MG: 6.25 TABLET, FILM COATED ORAL at 16:46

## 2024-07-15 RX ADMIN — CARVEDILOL 6.25 MG: 6.25 TABLET, FILM COATED ORAL at 08:31

## 2024-07-15 RX ADMIN — METHENAMINE HIPPURATE 1 G: 1000 TABLET ORAL at 16:46

## 2024-07-15 RX ADMIN — SODIUM CHLORIDE, PRESERVATIVE FREE 10 ML: 5 INJECTION INTRAVENOUS at 20:29

## 2024-07-15 RX ADMIN — SODIUM CHLORIDE, PRESERVATIVE FREE 10 ML: 5 INJECTION INTRAVENOUS at 01:42

## 2024-07-15 RX ADMIN — ATORVASTATIN CALCIUM 80 MG: 40 TABLET, FILM COATED ORAL at 08:32

## 2024-07-15 RX ADMIN — BUMETANIDE 0.5 MG/HR: 0.25 INJECTION INTRAMUSCULAR; INTRAVENOUS at 23:28

## 2024-07-15 RX ADMIN — APIXABAN 5 MG: 5 TABLET, FILM COATED ORAL at 08:32

## 2024-07-15 RX ADMIN — GUAIFENESIN 600 MG: 600 TABLET, EXTENDED RELEASE ORAL at 08:32

## 2024-07-15 RX ADMIN — THERA TABS 1 TABLET: TAB at 08:31

## 2024-07-15 ASSESSMENT — PAIN SCALES - GENERAL
PAINLEVEL_OUTOF10: 0

## 2024-07-15 NOTE — CONSULTS
[facility]  (A Division of [facility])      Consultation Note      Date of Admission: 7/10/2024    Date of Note: 7/15/2024      Reason for Referral: Bilateral lower extremity heel wounds  Referring Physician: Dr. Cody Barrera from this admission:   None    Current Antimicrobials:    Prior Antimicrobials:  None Azithromycin 7/90 x 1 dose       Assessment:         Osteomyelitis and diabetic foot ulcer of the left calcaneus: Status post I&D with Calc ectomy on 6/4.  Surgical cultures positive for Enterococcus faecalis.  Biopsy consistent with osteomyelitis.  Completed therapy on 7/5 with IV vancomycin..   -Overall wound to the left heel as well as the right seem to be clean although there is some drainage at the base of the left heel.  Acute on chronic HFmrEF: Initial BNP 1330.  Nonischemic dilated cardiomyopathy  ALEXA with CKD stage IV  Diabetes mellitus type 2  Chronic nonocclusive left femoral DVT on Eliquis  Plan:   Continue with local wound care.  Wound care team is following.   -Discussed with nursing to ensure that dressings are changed every 3 days.  Last dressing change was on 7/12    No additional antibiotics needed for now.  Wounds are going to be slow to heal given immobility and underlying diabetes with neuropathy.  Anticipate another 2 to 3 months before wounds are closed.   -Tight glucose control for optimal wound healing   -Adequate nutrition for optimal wound healing    In the interim, okay to exchange pacemaker.    Discussed with Dr. Ross at bedside    [facility]  [address], [address], [place] [address]  Office: [phone], Ext [phone]      Lines / Catheters:  Peripheral    HPI:  Ms. Rossi is a very pleasant 73-year-old female with a past medical history of anemia of chronic disease, chronic systolic heart failure, diabetes mellitus type 2 with neuropathy chronic kidney disease, prior  Vital Sign     Unable to Pay for Housing in the Last Year: No     Number of Places Lived in the Last Year: 1     Unstable Housing in the Last Year: No        Review of Systems    Negative Unless BOLDED    General: fevers, chills, unexplained weight loss, malaise, fatigue, prior antibtiotics  HEENT:  headaches,sinus pain or presure, recent URI, recent dental procedures;  tinnitus, hearing loss, catarats, dizziness, sore throat, rhinorrhea, hoarseness   PUlMONARY:  cough , shortness of breath, sputum production, wheezing,  hx of asthma, hx COPD, previous treatement for TB/PPD +.  Cardiovascular: chest pain, previous CAD/MI, hx valvular heart disease,  murmurs, peripheral edema, weight gain  GI:   nausea, vomiting, diarrhea, abdominal pain, prior C.diff, heartburn, melena, hematochezia, constipation  :  urinary frequency, dysuria, hematuria, bladder incontinence, flank pain, chronic lee, recurrent UTI's, urine retention  Neurologic:  seizures, syncope, prior CVA/TIA, confusion, memory impairment, neuropathy, weakness of extremities, difficulty speaking/word finding, headache, vision changes  Musculoskeletal:  myalgias, arthralgias, joint pain, joint swelling, back pain, falls, prior joint replacement  Skin:  Pruritis, rash, chronic stasis changes, diabetic foot ulcers, lymphedema, wounds  Endocrine: polyuria, polydipsia, hair loss  Psych: anxiousness, prior substance abuse, suicidal ideation, depressed mood.  Heme-Onc: prior DVT, easy bruising, fatigue, malignancy        Objective:      /70   Pulse 69   Temp 98 °F (36.7 °C) (Oral)   Resp 20   Ht 1.702 m (5' 7\")   Wt 112.4 kg (247 lb 12.8 oz)   LMP  (LMP Unknown)   SpO2 100%   BMI 38.81 kg/m²   Temp (24hrs), Av.1 °F (36.7 °C), Min:97.9 °F (36.6 °C), Max:98.4 °F (36.9 °C)        General:   awake alert and oriented, appears stated age   Skin:    no jaundice, right heel with shallow wound, minimal drainage, no odor, good granulation tissue.  Left foot

## 2024-07-15 NOTE — PROGRESS NOTES
no evidence of acute deep or supervision vein thrombosis  - History of CVA with right sided deficits. Patient is bedbound. On statin   - Recent osteomyelitis, bilateral heel wounds, status post debridement, and left partial calcanectomy 6/4/2024. Antibiotic course completed.    - History of chronic bladder retention from spinal cord injury, status post suprapubic catheter placement 2023. Followed by urology as outpatient     Primary cardiologist: Dr. Guzmán    Plan:     Addendum: Independently seen and evaluated.  Agree with below.  Appreciate ID input.  Tentatively, she is scheduled for generator change on Wednesday if she is able to lay flat for 60-90 minutes.  Her Eliquis will be held for 48 hours prior to the procedure.  Continue diuresis.  She has had good response.    - Volume management per nephrology. Monitor renal function and electrolytes. Patient remains on bumex gtt.  - Keep K > 4.0 and Mg > 2.0, replace as necessary  - Continue coreg and statin.  - Have asked ID to see patient with recent osteomyelitis and recurrent infections.  - Will tentatively plan for pacemaker generator change on Wednesday if patient is able to lie flat and is cleared by ID. Eliquis has been held.  Will place NPO orders accordingly.    Subjective:     No new complaints.     Objective:      Patient Vitals for the past 8 hrs:   Temp Pulse Resp BP SpO2   07/15/24 0814 98.4 °F (36.9 °C) 64 20 118/68 99 %         Patient Vitals for the past 96 hrs:   Weight   07/14/24 0600 112.4 kg (247 lb 12.8 oz)   07/12/24 1012 115.7 kg (255 lb)       TELE: paced               Current Facility-Administered Medications   Medication Dose Route Frequency    potassium chloride (KLOR-CON M) extended release tablet 40 mEq  40 mEq Oral Daily    guaiFENesin (MUCINEX) extended release tablet 600 mg  600 mg Oral BID    insulin lispro (HUMALOG,ADMELOG) injection vial 10 Units  10 Units SubCUTAneous TID AC    insulin glargine (LANTUS) injection vial 30 Units   30 Units SubCUTAneous Nightly    acetaminophen (TYLENOL) tablet 500 mg  500 mg Oral Q4H PRN    cyclobenzaprine (FLEXERIL) tablet 5 mg  5 mg Oral TID PRN    camphor-menthol-methyl salicylate (BENGAY ULTRA STRENGTH) 4-10-30 % cream   Apply externally TID PRN    apixaban (ELIQUIS) tablet 5 mg  5 mg Oral BID    carvedilol (COREG) tablet 6.25 mg  6.25 mg Oral BID WC    hydrOXYzine HCl (ATARAX) tablet 25 mg  25 mg Oral Q6H PRN    methenamine (HIPREX) tablet 1 g (Patient Supplied)  1 g Oral BID WC    multivitamin 1 tablet  1 tablet Oral Daily    sodium chloride flush 0.9 % injection 5-40 mL  5-40 mL IntraVENous 2 times per day    sodium chloride flush 0.9 % injection 5-40 mL  5-40 mL IntraVENous PRN    0.9 % sodium chloride infusion   IntraVENous PRN    ondansetron (ZOFRAN-ODT) disintegrating tablet 4 mg  4 mg Oral Q8H PRN    Or    ondansetron (ZOFRAN) injection 4 mg  4 mg IntraVENous Q6H PRN    polyethylene glycol (GLYCOLAX) packet 17 g  17 g Oral Daily PRN    glucose chewable tablet 16 g  4 tablet Oral PRN    dextrose bolus 10% 125 mL  125 mL IntraVENous PRN    Or    dextrose bolus 10% 250 mL  250 mL IntraVENous PRN    glucagon (rDNA) injection 1 mg  1 mg SubCUTAneous PRN    dextrose 10 % infusion   IntraVENous Continuous PRN    insulin lispro (HUMALOG,ADMELOG) injection vial 0-8 Units  0-8 Units SubCUTAneous TID WC    insulin lispro (HUMALOG,ADMELOG) injection vial 0-4 Units  0-4 Units SubCUTAneous Nightly    bumetanide (BUMEX) 12.5 mg in 50 mL infusion  1 mg/hr IntraVENous Continuous    atorvastatin (LIPITOR) tablet 80 mg  80 mg Oral Daily         Intake/Output Summary (Last 24 hours) at 7/15/2024 1111  Last data filed at 7/14/2024 2030  Gross per 24 hour   Intake 1740 ml   Output 1200 ml   Net 540 ml       Physical Exam:  General:  alert, appears stated age, and cooperative  Neck:  no JVD  Lungs:  rales bibasilar  Heart:  regular rate and rhythm, S1, S2 normal, no murmur, click, rub or gallop  Abdomen:  abdomen is

## 2024-07-15 NOTE — CARE COORDINATION
Order noted for DME. DME hospital bed ordered through [facility] to be delivered to patients home.     Genevieve EDOUARD, RN   Case Management   [phone]

## 2024-07-15 NOTE — PROGRESS NOTES
Physician Progress Note      PATIENT:               REJI CRISOSTOMO  CSN #:                  [id number]  :                       1950  ADMIT DATE:       7/10/2024 6:55 AM  DISCH DATE:  RESPONDING  PROVIDER #:        Scotty Fragoso MD          QUERY TEXT:    Pt admitted with heart failure. Pt noted to have pressure ulcers per WOCN   consult note. If possible, please document in progress notes and discharge   summary the present on admission status of stage 4 pressure ulcers to Right   Heel, Left Heel, and Left foot Lateral Distal:    The medical record reflects the following:  Risk Factors: 73 year old female, past medical hx of stroke with residual   right-sided deficit and bedbound at baseline, Bell's palsy  Clinical Indicators:  WOCN consult note - Pressure Stage 4 Heel Right,   Pressure Stage 4 Heel Left, Pressure Stage 4 Foot Left;Lateral;Distal  Treatment: Clean wound to bilateral heels with wound spray then apply Opticell   Ag  then cover with dry bulky dressing. Change dressing every 3 days and PRN        Please email [email or web address] with any questions  Options provided:  -- Yes, stage 4 pressure ulcers to Right Heel, Left Heel, and Left foot   Lateral Distal was present at the time of the order to admit to the hospital  -- No, stage 4 pressure ulcers to Right Heel, Left Heel, and Left foot Lateral   Distal was not present on admission and developed during the inpatient stay  -- Other - I will add my own diagnosis  -- Disagree - Not applicable / Not valid  -- Disagree - Clinically unable to determine / Unknown  -- Refer to Clinical Documentation Reviewer    PROVIDER RESPONSE TEXT:    Yes, stage 4 pressure ulcers to Right Heel, Left Heel, and Left foot Lateral   Distal was present at the time of the order to admit to the hospital.    Query created by: Nga De La Cruz on 7/15/2024 8:46 AM      QUERY TEXT:    Patient admitted with CHF exacerbation. Noted  documentation of acute   respiratory failure in 7/11 Attending PN. In order to support the diagnosis of   acute respiratory failure, please include additional clinical indicators in   your documentation.  Or please document if the diagnosis of acute respiratory   failure has been ruled out after further study.    The medical record reflects the following:  Risk Factors: 73 year old female, SOB, Cough, Afib, CKD  Clinical Indicators: 7/11 Attending PN - Acute respiratory failure is   secondary to CHF exacerbation; continue management of underlying etiology  Continue supplemental oxygen to keep oxygen saturation between 93% to 96%  Lungs: Faint crackles auscultated in the posterior lung fields  7/10 ER RN note - Pt placed on 2L/nc O2 for comfort  7/10 H&P - PULMONARY: +shortness of breath. +cough when laying flat.  No   wheeze.  Awake and alert, oriented x 4.  Sitting up in bed, speaking in full sentences   on supplemental oxygen.  RR: 9-30 per flowsheet  O2 sats: % before 2L NC placed  Treatment: 2L nasal cannula    Acute Respiratory Failure Clinical Indicators per 3M MS-DRG Training Guide and   Quick Reference Guide:  pO2 < 60 mmHg or SpO2 (pulse oximetry) < 91% breathing room air  pCO2 > 50 and pH < 7.35  P/F ratio (pO2 / FIO2) < 300  pO2 decrease or pCO2 increase by 10 mmHg from baseline (if known)  Supplemental oxygen of 40% or more  Presence of respiratory distress, tachypnea, dyspnea, shortness of breath,   wheezing  Unable to speak in complete sentences  Use of accessory muscles to breathe  Extreme anxiety and feeling of impending doom  Tripod position  Confusion/altered mental status/obtunded      Please email [email or web address] with any questions  Options provided:  -- Acute Respiratory Failure as evidenced by, Please document evidence.  -- Acute Respiratory Failure ruled out after study  -- Other - I will add my own diagnosis  -- Disagree - Not applicable / Not valid  -- Disagree - Clinically

## 2024-07-15 NOTE — PROGRESS NOTES
[facility]  Progress Note    Patient: Meggan Rossi Age: 73 y.o. : 1950 MR#: [id number] SSN: [id number]  Date: 7/15/2024         DVT Prophylaxis:  []Lovenox  []Hep SQ  []SCDs  []Coumadin   []On Heparin gtt [x]PO anticoagulant      Subjective:     Patient lying in the bed, feels slightly better.  Still not able to lie flat due to orthopnea.  Patient diuresing well with Bumex drip.    Objective:   VS: /70   Pulse 69   Temp 98 °F (36.7 °C) (Oral)   Resp 20   Ht 1.702 m (5' 7\")   Wt 112.4 kg (247 lb 12.8 oz)   LMP  (LMP Unknown)   SpO2 100%   BMI 38.81 kg/m²    Tmax/24hrs: Temp (24hrs), Av.1 °F (36.7 °C), Min:97.9 °F (36.6 °C), Max:98.4 °F (36.9 °C)    Intake/Output Summary (Last 24 hours) at 7/15/2024 1535  Last data filed at 7/15/2024 1135  Gross per 24 hour   Intake 780 ml   Output 2000 ml   Net -1220 ml        PHYSICAL EXAM  General:  Awake, alert  Cardiovascular:  S1S2+, RRR  Pulmonary: Bilateral basal Rales  GI:  Soft, BS+, NT, ND  Extremities:  2+ edema        Current Facility-Administered Medications   Medication Dose Route Frequency    potassium chloride (KLOR-CON M) extended release tablet 40 mEq  40 mEq Oral Daily    guaiFENesin (MUCINEX) extended release tablet 600 mg  600 mg Oral BID    insulin lispro (HUMALOG,ADMELOG) injection vial 10 Units  10 Units SubCUTAneous TID AC    insulin glargine (LANTUS) injection vial 30 Units  30 Units SubCUTAneous Nightly    acetaminophen (TYLENOL) tablet 500 mg  500 mg Oral Q4H PRN    cyclobenzaprine (FLEXERIL) tablet 5 mg  5 mg Oral TID PRN    camphor-menthol-methyl salicylate (BENGAY ULTRA STRENGTH) 4-10-30 % cream   Apply externally TID PRN    [Held by provider] apixaban (ELIQUIS) tablet 5 mg  5 mg Oral BID    carvedilol (COREG) tablet 6.25 mg  6.25 mg Oral BID WC    hydrOXYzine HCl (ATARAX) tablet 25 mg  25 mg Oral Q6H PRN    methenamine (HIPREX) tablet 1 g (Patient Supplied)  1 g Oral BID WC     multivitamin 1 tablet  1 tablet Oral Daily    sodium chloride flush 0.9 % injection 5-40 mL  5-40 mL IntraVENous 2 times per day    sodium chloride flush 0.9 % injection 5-40 mL  5-40 mL IntraVENous PRN    0.9 % sodium chloride infusion   IntraVENous PRN    ondansetron (ZOFRAN-ODT) disintegrating tablet 4 mg  4 mg Oral Q8H PRN    Or    ondansetron (ZOFRAN) injection 4 mg  4 mg IntraVENous Q6H PRN    polyethylene glycol (GLYCOLAX) packet 17 g  17 g Oral Daily PRN    glucose chewable tablet 16 g  4 tablet Oral PRN    dextrose bolus 10% 125 mL  125 mL IntraVENous PRN    Or    dextrose bolus 10% 250 mL  250 mL IntraVENous PRN    glucagon (rDNA) injection 1 mg  1 mg SubCUTAneous PRN    dextrose 10 % infusion   IntraVENous Continuous PRN    insulin lispro (HUMALOG,ADMELOG) injection vial 0-8 Units  0-8 Units SubCUTAneous TID WC    insulin lispro (HUMALOG,ADMELOG) injection vial 0-4 Units  0-4 Units SubCUTAneous Nightly    bumetanide (BUMEX) 12.5 mg in 50 mL infusion  0.5 mg/hr IntraVENous Continuous    atorvastatin (LIPITOR) tablet 80 mg  80 mg Oral Daily        Labs:    Recent Results (from the past 24 hour(s))   POCT Glucose    Collection Time: 07/14/24  4:10 PM   Result Value Ref Range    POC Glucose 161 (H) 70 - 110 mg/dL   POCT Glucose    Collection Time: 07/14/24 10:03 PM   Result Value Ref Range    POC Glucose 177 (H) 70 - 110 mg/dL   Basic Metabolic Panel    Collection Time: 07/15/24  2:39 AM   Result Value Ref Range    Sodium 139 136 - 145 mmol/L    Potassium 3.7 3.5 - 5.5 mmol/L    Chloride 101 100 - 111 mmol/L    CO2 30 21 - 32 mmol/L    Anion Gap 8 3.0 - 18 mmol/L    Glucose 204 (H) 74 - 99 mg/dL    BUN 69 (H) 7.0 - 18 MG/DL    Creatinine 2.37 (H) 0.6 - 1.3 MG/DL    BUN/Creatinine Ratio 29 (H) 12 - 20      Est, Glom Filt Rate 21 (L) >60 ml/min/1.73m2    Calcium 9.2 8.5 - 10.1 MG/DL   CBC with Auto Differential    Collection Time: 07/15/24  2:39 AM   Result Value Ref Range    WBC 6.3 4.6 - 13.2 K/uL    RBC 3.42

## 2024-07-15 NOTE — PLAN OF CARE
coronaries.  Elev LVEDP.  EF 50-55%.      Type 2 diabetes mellitus with diabetic neuropathy (HCC) 6/28/2011     Past Surgical History:   Procedure Laterality Date    CARPAL TUNNEL RELEASE  4/07    right     CHOLECYSTECTOMY  1994    COLONOSCOPY N/A 10/5/2021    COLONOSCOPY performed by Sachin Pettit MD at [facility] ENDOSCOPY    HYSTERECTOMY (CERVIX STATUS UNKNOWN)  1973    OTHER SURGICAL HISTORY  6/11/2012    AICD revision    PACEMAKER  4/28/2005    [employer] AICD    REMVL PERM PM PLS GEN W/REPL PLSE GEN 2 LEAD SYS N/A 2/26/2019    REMOVE & REPLACE PPM GEN DUAL LEAD performed by Marcello Jang MD at [facility] CARDIAC CATH LAB       Home Situation:  Social/Functional History  Lives With: Daughter  Type of Home: House  Home Layout: One level  Home Access: Ramped entrance  Bathroom Shower/Tub: Walk-in shower (uses a specialized wheelchair that goes in the shower.)  Bathroom Toilet: Bedside commode  Bathroom Accessibility: Accessible, Wheelchair accessible  Home Equipment: Wheelchair - Electric  Receives Help From: Family  ADL Assistance: Needs assistance  Toileting: Needs assistance  Homemaking Assistance: Needs assistance  Homemaking Responsibilities: No  Ambulation Assistance: Non-ambulatory  Transfer Assistance: Needs assistance  Active : No  Patient's  Info: Daugthers transport in family  van  Mode of Transportation: Van  Occupation: Retired  Type of Occupation:   Critical Behavior:  Orientation  Overall Orientation Status: Within Normal Limits  Orientation Level: Oriented X4       Strength:    Strength: Generally decreased, functional         Range Of Motion:  AROM: Generally decreased, functional       Functional Mobility:  Bed Mobility:     Bed Mobility Training  Bed Mobility Training: Yes  Supine to Sit: Minimum assistance  Sit to Supine: Minimum assistance  Scooting: Minimum assistance    Balance:     Balance  Sitting: Impaired  Sitting - Static: Good (unsupported)  Sitting - Dynamic:

## 2024-07-15 NOTE — PROGRESS NOTES
RENAL DAILY PROGRESS NOTE              Subjective:       Complaint:   Overnight events noted  no nausea, vomiting, chest pain  Breathing is better  Coughing is somewhat better  No documentation from last night shift. Need more accurate and diligent documentation.    IMPRESSION:   ALEXA due to cardiorenal syndrome  CKD stage 4 due to diabetes,hypertension  Diabetes  Hx diabetic foot,osteomyelitis,heel ulcers s/p calcanectomy 6/4/24.  Controlled hypertension  NICMO EF 35-40  Hx spinal cord injury with chronic bladder retention, s/p supra pubic catheter  Hx diabetic neuropathy   PLAN:   Cut down on dose of bumex drip  I and O  Daily labs  Daily weights           Current Facility-Administered Medications   Medication Dose Route Frequency    potassium chloride (KLOR-CON M) extended release tablet 40 mEq  40 mEq Oral Daily    guaiFENesin (MUCINEX) extended release tablet 600 mg  600 mg Oral BID    insulin lispro (HUMALOG,ADMELOG) injection vial 10 Units  10 Units SubCUTAneous TID AC    insulin glargine (LANTUS) injection vial 30 Units  30 Units SubCUTAneous Nightly    acetaminophen (TYLENOL) tablet 500 mg  500 mg Oral Q4H PRN    cyclobenzaprine (FLEXERIL) tablet 5 mg  5 mg Oral TID PRN    camphor-menthol-methyl salicylate (BENGAY ULTRA STRENGTH) 4-10-30 % cream   Apply externally TID PRN    [Held by provider] apixaban (ELIQUIS) tablet 5 mg  5 mg Oral BID    carvedilol (COREG) tablet 6.25 mg  6.25 mg Oral BID WC    hydrOXYzine HCl (ATARAX) tablet 25 mg  25 mg Oral Q6H PRN    methenamine (HIPREX) tablet 1 g (Patient Supplied)  1 g Oral BID WC    multivitamin 1 tablet  1 tablet Oral Daily    sodium chloride flush 0.9 % injection 5-40 mL  5-40 mL IntraVENous 2 times per day    sodium chloride flush 0.9 % injection 5-40 mL  5-40 mL IntraVENous PRN    0.9 % sodium chloride infusion   IntraVENous PRN    ondansetron (ZOFRAN-ODT) disintegrating tablet 4 mg  4 mg Oral Q8H PRN    Or    ondansetron (ZOFRAN) injection 4 mg

## 2024-07-16 LAB
ALBUMIN SERPL ELPH-MCNC: 2.8 G/DL (ref 2.9–4.4)
ALBUMIN/GLOB SERPL: 0.9 (ref 0.7–1.7)
ALPHA1 GLOB SERPL ELPH-MCNC: 0.3 G/DL (ref 0–0.4)
ALPHA2 GLOB SERPL ELPH-MCNC: 0.8 G/DL (ref 0.4–1)
ANION GAP SERPL CALC-SCNC: 6 MMOL/L (ref 3–18)
B-GLOBULIN SERPL ELPH-MCNC: 1 G/DL (ref 0.7–1.3)
BASOPHILS # BLD: 0.1 K/UL (ref 0–0.1)
BASOPHILS NFR BLD: 1 % (ref 0–2)
BUN SERPL-MCNC: 74 MG/DL (ref 7–18)
BUN/CREAT SERPL: 28 (ref 12–20)
CALCIUM SERPL-MCNC: 9.3 MG/DL (ref 8.5–10.1)
CHLORIDE SERPL-SCNC: 102 MMOL/L (ref 100–111)
CO2 SERPL-SCNC: 30 MMOL/L (ref 21–32)
CREAT SERPL-MCNC: 2.61 MG/DL (ref 0.6–1.3)
DIFFERENTIAL METHOD BLD: ABNORMAL
EOSINOPHIL # BLD: 0.3 K/UL (ref 0–0.4)
EOSINOPHIL NFR BLD: 5 % (ref 0–5)
ERYTHROCYTE [DISTWIDTH] IN BLOOD BY AUTOMATED COUNT: 17.1 % (ref 11.6–14.5)
GAMMA GLOB SERPL ELPH-MCNC: 1.3 G/DL (ref 0.4–1.8)
GLOBULIN SER-MCNC: 3.4 G/DL (ref 2.2–3.9)
GLUCOSE BLD STRIP.AUTO-MCNC: 138 MG/DL (ref 70–110)
GLUCOSE BLD STRIP.AUTO-MCNC: 149 MG/DL (ref 70–110)
GLUCOSE BLD STRIP.AUTO-MCNC: 183 MG/DL (ref 70–110)
GLUCOSE BLD STRIP.AUTO-MCNC: 248 MG/DL (ref 70–110)
GLUCOSE SERPL-MCNC: 144 MG/DL (ref 74–99)
HCT VFR BLD AUTO: 27.7 % (ref 35–45)
HGB BLD-MCNC: 8.9 G/DL (ref 12–16)
IGA SERPL-MCNC: 158 MG/DL (ref 64–422)
IGG SERPL-MCNC: 1489 MG/DL (ref 586–1602)
IGM SERPL-MCNC: 42 MG/DL (ref 26–217)
IMM GRANULOCYTES # BLD AUTO: 0 K/UL (ref 0–0.04)
IMM GRANULOCYTES NFR BLD AUTO: 0 % (ref 0–0.5)
INTERPRETATION SERPL IEP-IMP: ABNORMAL
KAPPA LC FREE SER-MCNC: 88.3 MG/L (ref 3.3–19.4)
KAPPA LC FREE/LAMBDA FREE SER: 1.85 (ref 0.26–1.65)
LAMBDA LC FREE SERPL-MCNC: 47.8 MG/L (ref 5.7–26.3)
LYMPHOCYTES # BLD: 2.1 K/UL (ref 0.9–3.6)
LYMPHOCYTES NFR BLD: 30 % (ref 21–52)
M PROTEIN SERPL ELPH-MCNC: ABNORMAL G/DL
MAGNESIUM SERPL-MCNC: 2 MG/DL (ref 1.6–2.6)
MCH RBC QN AUTO: 26 PG (ref 24–34)
MCHC RBC AUTO-ENTMCNC: 32.1 G/DL (ref 31–37)
MCV RBC AUTO: 81 FL (ref 78–100)
MONOCYTES # BLD: 0.6 K/UL (ref 0.05–1.2)
MONOCYTES NFR BLD: 9 % (ref 3–10)
NEUTS SEG # BLD: 3.9 K/UL (ref 1.8–8)
NEUTS SEG NFR BLD: 56 % (ref 40–73)
NRBC # BLD: 0 K/UL (ref 0–0.01)
NRBC BLD-RTO: 0 PER 100 WBC
PLATELET # BLD AUTO: 239 K/UL (ref 135–420)
PMV BLD AUTO: 9.8 FL (ref 9.2–11.8)
POTASSIUM SERPL-SCNC: 3.4 MMOL/L (ref 3.5–5.5)
PROT SERPL-MCNC: 6.2 G/DL (ref 6–8.5)
RBC # BLD AUTO: 3.42 M/UL (ref 4.2–5.3)
SODIUM SERPL-SCNC: 138 MMOL/L (ref 136–145)
WBC # BLD AUTO: 7 K/UL (ref 4.6–13.2)

## 2024-07-16 PROCEDURE — 6370000000 HC RX 637 (ALT 250 FOR IP): Performed by: HOSPITALIST

## 2024-07-16 PROCEDURE — 2580000003 HC RX 258: Performed by: HOSPITALIST

## 2024-07-16 PROCEDURE — 80048 BASIC METABOLIC PNL TOTAL CA: CPT

## 2024-07-16 PROCEDURE — 82962 GLUCOSE BLOOD TEST: CPT

## 2024-07-16 PROCEDURE — 85025 COMPLETE CBC W/AUTO DIFF WBC: CPT

## 2024-07-16 PROCEDURE — 94761 N-INVAS EAR/PLS OXIMETRY MLT: CPT

## 2024-07-16 PROCEDURE — 99232 SBSQ HOSP IP/OBS MODERATE 35: CPT | Performed by: HOSPITALIST

## 2024-07-16 PROCEDURE — 1100000000 HC RM PRIVATE

## 2024-07-16 PROCEDURE — 36415 COLL VENOUS BLD VENIPUNCTURE: CPT

## 2024-07-16 PROCEDURE — 6360000002 HC RX W HCPCS: Performed by: HOSPITALIST

## 2024-07-16 PROCEDURE — 6370000000 HC RX 637 (ALT 250 FOR IP): Performed by: INTERNAL MEDICINE

## 2024-07-16 PROCEDURE — 83735 ASSAY OF MAGNESIUM: CPT

## 2024-07-16 PROCEDURE — 6370000000 HC RX 637 (ALT 250 FOR IP): Performed by: STUDENT IN AN ORGANIZED HEALTH CARE EDUCATION/TRAINING PROGRAM

## 2024-07-16 RX ORDER — DOCUSATE SODIUM 100 MG/1
100 CAPSULE, LIQUID FILLED ORAL 2 TIMES DAILY
Status: DISCONTINUED | OUTPATIENT
Start: 2024-07-16 | End: 2024-07-19 | Stop reason: HOSPADM

## 2024-07-16 RX ORDER — HEPARIN SODIUM 5000 [USP'U]/ML
5000 INJECTION, SOLUTION INTRAVENOUS; SUBCUTANEOUS EVERY 8 HOURS SCHEDULED
Status: DISCONTINUED | OUTPATIENT
Start: 2024-07-16 | End: 2024-07-17

## 2024-07-16 RX ADMIN — METHENAMINE HIPPURATE 1 G: 1000 TABLET ORAL at 07:59

## 2024-07-16 RX ADMIN — PSYLLIUM HUSK 1 PACKET: 3.4 POWDER ORAL at 21:43

## 2024-07-16 RX ADMIN — SODIUM CHLORIDE, PRESERVATIVE FREE 10 ML: 5 INJECTION INTRAVENOUS at 21:43

## 2024-07-16 RX ADMIN — INSULIN GLARGINE 30 UNITS: 100 INJECTION, SOLUTION SUBCUTANEOUS at 21:43

## 2024-07-16 RX ADMIN — INSULIN LISPRO 10 UNITS: 100 INJECTION, SOLUTION INTRAVENOUS; SUBCUTANEOUS at 07:59

## 2024-07-16 RX ADMIN — THERA TABS 1 TABLET: TAB at 07:59

## 2024-07-16 RX ADMIN — DOCUSATE SODIUM 100 MG: 100 CAPSULE, LIQUID FILLED ORAL at 21:43

## 2024-07-16 RX ADMIN — INSULIN LISPRO 10 UNITS: 100 INJECTION, SOLUTION INTRAVENOUS; SUBCUTANEOUS at 11:49

## 2024-07-16 RX ADMIN — DOCUSATE SODIUM 100 MG: 100 CAPSULE, LIQUID FILLED ORAL at 11:49

## 2024-07-16 RX ADMIN — CARVEDILOL 6.25 MG: 6.25 TABLET, FILM COATED ORAL at 16:24

## 2024-07-16 RX ADMIN — SODIUM CHLORIDE, PRESERVATIVE FREE 10 ML: 5 INJECTION INTRAVENOUS at 08:04

## 2024-07-16 RX ADMIN — METHENAMINE HIPPURATE 1 G: 1000 TABLET ORAL at 16:30

## 2024-07-16 RX ADMIN — POTASSIUM CHLORIDE 40 MEQ: 1500 TABLET, EXTENDED RELEASE ORAL at 07:59

## 2024-07-16 RX ADMIN — ATORVASTATIN CALCIUM 80 MG: 40 TABLET, FILM COATED ORAL at 07:59

## 2024-07-16 RX ADMIN — GUAIFENESIN 600 MG: 600 TABLET, EXTENDED RELEASE ORAL at 07:59

## 2024-07-16 RX ADMIN — INSULIN LISPRO 10 UNITS: 100 INJECTION, SOLUTION INTRAVENOUS; SUBCUTANEOUS at 16:24

## 2024-07-16 RX ADMIN — GUAIFENESIN 600 MG: 600 TABLET, EXTENDED RELEASE ORAL at 21:43

## 2024-07-16 RX ADMIN — INSULIN LISPRO 2 UNITS: 100 INJECTION, SOLUTION INTRAVENOUS; SUBCUTANEOUS at 11:51

## 2024-07-16 RX ADMIN — PSYLLIUM HUSK 1 PACKET: 3.4 POWDER ORAL at 11:51

## 2024-07-16 RX ADMIN — HYDROXYZINE HYDROCHLORIDE 25 MG: 25 TABLET, FILM COATED ORAL at 09:23

## 2024-07-16 RX ADMIN — HEPARIN SODIUM 5000 UNITS: 5000 INJECTION INTRAVENOUS; SUBCUTANEOUS at 21:54

## 2024-07-16 ASSESSMENT — PAIN SCALES - GENERAL
PAINLEVEL_OUTOF10: 0

## 2024-07-16 NOTE — PROGRESS NOTES
Supplemental oxygen removed, pt O2 sat 97% on RA, ADL care performed in bed, pt O2 sat 92% while providing care.

## 2024-07-16 NOTE — PROGRESS NOTES
[facility]  (A Division of [facility])      Consultation Note      Date of Admission: 7/10/2024    Date of Note: 7/16/2024      Reason for Referral: Bilateral lower extremity heel wounds  Referring Physician: Dr. Cody Barrera from this admission:   None    Current Antimicrobials:    Prior Antimicrobials:  None Azithromycin 7/90 x 1 dose       Assessment:         Osteomyelitis and diabetic foot ulcer of the left calcaneus: Status post I&D with Calc ectomy on 6/4.  Surgical cultures positive for Enterococcus faecalis.  Biopsy consistent with osteomyelitis.  Completed therapy on 7/5 with IV vancomycin..   -Overall wound to the left heel as well as the right seem to be clean although there is some drainage at the base of the left heel.  Acute on chronic HFmrEF: Initial BNP 1330.  Nonischemic dilated cardiomyopathy  ALEXA with CKD stage IV  Diabetes mellitus type 2  Chronic nonocclusive left femoral DVT on Eliquis  Plan:   Continue with local wound care.  Wound care team is following.   -Discussed with nursing to ensure that dressings are changed every 3 days.  Last dressing change was on 7/12    No additional antibiotics needed for now.  Wounds are going to be slow to heal given immobility and underlying diabetes with neuropathy.  Anticipate another 2 to 3 months before wounds are closed.   -Tight glucose control for optimal wound healing   -Adequate nutrition for optimal wound healing    In the interim, okay to exchange pacemaker.      Kayla Montenegro DO  [facility]  [address], [address] [address]  Office: [phone], Ext [phone]      Lines / Catheters:  Peripheral      Subjective:   Seen and examined.  Feeling much better today.  Trying to lay flat so she can get her pacemaker changed out.  Changed from IV Bumex to p.o.  Overall feeling better hoping to be able to go home soon    Tmax 98.7  WBC 7.0  HPI:  Ms. Rossi is

## 2024-07-16 NOTE — PROGRESS NOTES
tenderness  Neuro: non focal, awake, alert , CN II-XII are grossly intact  Extrm: no cyanosis, clubbing . No edema  Skin: no visible  Rash  Musculoskeletal: No gross joints or bone deformities         Data Review:     LABS:   Hematology:   Recent Labs     07/14/24  0243 07/15/24  0239 07/16/24  0323   WBC 6.4 6.3 7.0   HGB 8.8* 9.0* 8.9*   HCT 27.4* 27.8* 27.7*       Chemistry:   Recent Labs     07/14/24  0243 07/15/24  0239 07/16/24  0323   BUN 64* 69* 74*   K 3.4* 3.7 3.4*    139 138    101 102   CO2 29 30 30              Procedures/imaging: see electronic medical records for all procedures, Xrays and details which were not copied into this note but were reviewed prior to creation of Plan          Assessment & Plan:     See above        PETE PUENTES MD  7/16/2024  10:50 AM

## 2024-07-16 NOTE — PLAN OF CARE
Problem: Pain  Goal: Verbalizes/displays adequate comfort level or baseline comfort level  Outcome: Progressing  Flowsheets  Taken 7/15/2024 1929 by Maria Eugenia Akhtar RN  Verbalizes/displays adequate comfort level or baseline comfort level:   Assess pain using appropriate pain scale   Encourage patient to monitor pain and request assistance    Problem: Safety - Adult  Goal: Free from fall injury  Outcome: Progressing     Problem: Chronic Conditions and Co-morbidities  Goal: Patient's chronic conditions and co-morbidity symptoms are monitored and maintained or improved  Outcome: Progressing  Flowsheets (Taken 7/15/2024 1929)  Care Plan - Patient's Chronic Conditions and Co-Morbidity Symptoms are Monitored and Maintained or Improved:   Monitor and assess patient's chronic conditions and comorbid symptoms for stability, deterioration, or improvement   Collaborate with multidisciplinary team to address chronic and comorbid conditions and prevent exacerbation or deterioration

## 2024-07-16 NOTE — PROGRESS NOTES
[facility] - Progress Note    Admit Date: 7/10/2024  Attending Cardiologist: Dr. Phillips    Assessment:     Patient Active Problem List    Diagnosis Date Noted    Edema of left lower extremity 07/15/2024    Hypoxia 07/15/2024    CKD (chronic kidney disease), stage IV ([facility]) 07/11/2024    Anemia of chronic disease 07/11/2024    Acute respiratory failure with hypoxia ([facility]) 07/11/2024    Acute on chronic congestive heart failure ([facility]) 07/10/2024    Pressure injury of skin of left heel 06/29/2023    Proteinuria 03/29/2021    Wheelchair dependence 03/05/2020    Neuropathic ulcer of heel, right, with fat layer exposed ([facility]) 03/05/2020    Systolic CHF, acute on chronic ([facility]) 12/13/2019    Elevated troponin 12/12/2019    SOB (shortness of breath) 12/12/2019    Urinary retention 09/17/2019    Diabetic polyneuropathy associated with type 2 diabetes mellitus ([facility]) 03/26/2019    Non-healing wound of right heel 03/26/2019    Infected wound 12/27/2018    Heel ulcer ([facility]) 10/17/2018    Osteomyelitis ([facility]) 08/16/2018    Foot osteomyelitis, right ([facility]) 08/16/2018    Class 3 severe obesity with body mass index (BMI) of 40.0 to 44.9 in adult ([facility]) 05/24/2018    Obesity (BMI 30.0-34.9) 05/04/2018    Type 2 diabetes mellitus with diabetic polyneuropathy, with long-term current use of insulin ([facility]) 03/12/2018    Neurogenic bladder 10/12/2017    Pacemaker 09/13/2017    Hypervolemia 06/06/2017    Psoas abscess, right ([facility]) 04/20/2017    Urinary tract infection due to Enterococcus 04/20/2017    Sepsis ([facility]) 04/20/2017    Rivera catheter in place on admission 04/20/2017    Group B streptococcal infection 04/20/2017    Acute paraplegia ([facility]) 04/20/2017    History of Coumadin therapy     Gout     Heart failure with mildly reduced ejection fraction (HFmrEF) ([facility])     Psoas hematoma, right, secondary to anticoagulant therapy 03/30/2017    Impaired mobility and ADLs 03/30/2017    Iliopsoas muscle hematoma 03/30/2017    Decreased

## 2024-07-16 NOTE — PROGRESS NOTES
[facility] Hospitalist Group  Progress Note    Patient: Meggan Rossi Age: 73 y.o. : 1950 MR#: [id number] SSN: [id number]  Date: 2024         DVT Prophylaxis:  []Lovenox  []Hep SQ  []SCDs  []Coumadin   []On Heparin gtt [x]PO anticoagulant      Subjective:     Patient lying in the bed, feeling better, slightly constipated.  No abdominal pain, no nausea or vomiting.    Objective:   VS: /75   Pulse 77   Temp 98.7 °F (37.1 °C) (Oral)   Resp 18   Ht 1.702 m (5' 7\")   Wt 113 kg (249 lb 1.9 oz)   LMP  (LMP Unknown)   SpO2 100%   BMI 39.02 kg/m²    Tmax/24hrs: Temp (24hrs), Av.1 °F (36.7 °C), Min:97.9 °F (36.6 °C), Max:98.7 °F (37.1 °C)    Intake/Output Summary (Last 24 hours) at 2024 1730  Last data filed at 2024 1626  Gross per 24 hour   Intake 960 ml   Output 2600 ml   Net -1640 ml        PHYSICAL EXAM  General:  Awake, alert  Cardiovascular:  S1S2+, RRR  Pulmonary: Bilateral basal minimal Rales  GI:  Soft, BS+, NT, ND  Extremities:  2+ edema  Alert awake Olympus 4, moves all extremities      Current Facility-Administered Medications   Medication Dose Route Frequency    docusate sodium (COLACE) capsule 100 mg  100 mg Oral BID    psyllium husk-aspartame (METAMUCIL FIBER) packet 1 packet  1 packet Oral BID    phenylephrine-mineral oil-petrolatum (PREPARATION H) rectal ointment   Rectal BID PRN    heparin (porcine) injection 5,000 Units  5,000 Units SubCUTAneous 3 times per day    potassium chloride (KLOR-CON M) extended release tablet 40 mEq  40 mEq Oral Daily    guaiFENesin (MUCINEX) extended release tablet 600 mg  600 mg Oral BID    insulin lispro (HUMALOG,ADMELOG) injection vial 10 Units  10 Units SubCUTAneous TID AC    insulin glargine (LANTUS) injection vial 30 Units  30 Units SubCUTAneous Nightly    acetaminophen (TYLENOL) tablet 500 mg  500 mg Oral Q4H PRN    cyclobenzaprine (FLEXERIL) tablet 5 mg  5 mg Oral TID PRN    camphor-menthol-methyl

## 2024-07-17 ENCOUNTER — ANESTHESIA (OUTPATIENT)
Facility: HOSPITAL | Age: 74
End: 2024-07-17
Payer: MEDICARE

## 2024-07-17 ENCOUNTER — ANESTHESIA EVENT (OUTPATIENT)
Facility: HOSPITAL | Age: 74
End: 2024-07-17
Payer: MEDICARE

## 2024-07-17 PROBLEM — Z45.010 PACEMAKER GENERATOR END OF LIFE: Status: ACTIVE | Noted: 2024-07-17

## 2024-07-17 LAB
ALBUMIN SERPL-MCNC: 3 G/DL (ref 3.4–5)
ANION GAP SERPL CALC-SCNC: 8 MMOL/L (ref 3–18)
BUN SERPL-MCNC: 72 MG/DL (ref 7–18)
BUN/CREAT SERPL: 29 (ref 12–20)
CALCIUM SERPL-MCNC: 9.8 MG/DL (ref 8.5–10.1)
CHLORIDE SERPL-SCNC: 101 MMOL/L (ref 100–111)
CO2 SERPL-SCNC: 29 MMOL/L (ref 21–32)
CREAT SERPL-MCNC: 2.45 MG/DL (ref 0.6–1.3)
ECHO BSA: 2.34 M2
GLUCOSE BLD STRIP.AUTO-MCNC: 159 MG/DL (ref 70–110)
GLUCOSE BLD STRIP.AUTO-MCNC: 161 MG/DL (ref 70–110)
GLUCOSE BLD STRIP.AUTO-MCNC: 214 MG/DL (ref 70–110)
GLUCOSE BLD STRIP.AUTO-MCNC: 288 MG/DL (ref 70–110)
GLUCOSE SERPL-MCNC: 219 MG/DL (ref 74–99)
PHOSPHATE SERPL-MCNC: 4.6 MG/DL (ref 2.5–4.9)
POTASSIUM SERPL-SCNC: 3.3 MMOL/L (ref 3.5–5.5)
SODIUM SERPL-SCNC: 138 MMOL/L (ref 136–145)

## 2024-07-17 PROCEDURE — 33229 REMV&REPLC PM GEN MULT LEADS: CPT | Performed by: INTERNAL MEDICINE

## 2024-07-17 PROCEDURE — 3700000001 HC ADD 15 MINUTES (ANESTHESIA): Performed by: INTERNAL MEDICINE

## 2024-07-17 PROCEDURE — 2709999900 HC NON-CHARGEABLE SUPPLY: Performed by: INTERNAL MEDICINE

## 2024-07-17 PROCEDURE — 36415 COLL VENOUS BLD VENIPUNCTURE: CPT

## 2024-07-17 PROCEDURE — 6370000000 HC RX 637 (ALT 250 FOR IP): Performed by: STUDENT IN AN ORGANIZED HEALTH CARE EDUCATION/TRAINING PROGRAM

## 2024-07-17 PROCEDURE — 2580000003 HC RX 258: Performed by: INTERNAL MEDICINE

## 2024-07-17 PROCEDURE — 2580000003 HC RX 258: Performed by: HOSPITALIST

## 2024-07-17 PROCEDURE — 82962 GLUCOSE BLOOD TEST: CPT

## 2024-07-17 PROCEDURE — 6370000000 HC RX 637 (ALT 250 FOR IP): Performed by: HOSPITALIST

## 2024-07-17 PROCEDURE — 0JPT0PZ REMOVAL OF CARDIAC RHYTHM RELATED DEVICE FROM TRUNK SUBCUTANEOUS TISSUE AND FASCIA, OPEN APPROACH: ICD-10-PCS | Performed by: INTERNAL MEDICINE

## 2024-07-17 PROCEDURE — 2500000003 HC RX 250 WO HCPCS: Performed by: NURSE ANESTHETIST, CERTIFIED REGISTERED

## 2024-07-17 PROCEDURE — 80069 RENAL FUNCTION PANEL: CPT

## 2024-07-17 PROCEDURE — 1100000000 HC RM PRIVATE

## 2024-07-17 PROCEDURE — 6360000002 HC RX W HCPCS: Performed by: INTERNAL MEDICINE

## 2024-07-17 PROCEDURE — C1892 INTRO/SHEATH,FIXED,PEEL-AWAY: HCPCS | Performed by: INTERNAL MEDICINE

## 2024-07-17 PROCEDURE — 3700000000 HC ANESTHESIA ATTENDED CARE: Performed by: INTERNAL MEDICINE

## 2024-07-17 PROCEDURE — 2500000003 HC RX 250 WO HCPCS: Performed by: INTERNAL MEDICINE

## 2024-07-17 PROCEDURE — 2580000003 HC RX 258: Performed by: NURSE ANESTHETIST, CERTIFIED REGISTERED

## 2024-07-17 PROCEDURE — 6370000000 HC RX 637 (ALT 250 FOR IP): Performed by: INTERNAL MEDICINE

## 2024-07-17 PROCEDURE — 0JH806Z INSERTION OF PACEMAKER, DUAL CHAMBER INTO ABDOMEN SUBCUTANEOUS TISSUE AND FASCIA, OPEN APPROACH: ICD-10-PCS | Performed by: INTERNAL MEDICINE

## 2024-07-17 PROCEDURE — 6360000002 HC RX W HCPCS: Performed by: NURSE ANESTHETIST, CERTIFIED REGISTERED

## 2024-07-17 PROCEDURE — C2621 PMKR, OTHER THAN SING/DUAL: HCPCS | Performed by: INTERNAL MEDICINE

## 2024-07-17 DEVICE — CRTP W1TR02 SERENA CRTP MRI US
Type: IMPLANTABLE DEVICE | Status: FUNCTIONAL
Brand: SERENA™ CRT-P MRI SURESCAN™

## 2024-07-17 RX ORDER — LIDOCAINE HYDROCHLORIDE 20 MG/ML
INJECTION, SOLUTION EPIDURAL; INFILTRATION; INTRACAUDAL; PERINEURAL PRN
Status: DISCONTINUED | OUTPATIENT
Start: 2024-07-17 | End: 2024-07-17 | Stop reason: SDUPTHER

## 2024-07-17 RX ORDER — DIPHENHYDRAMINE HYDROCHLORIDE 50 MG/ML
INJECTION INTRAMUSCULAR; INTRAVENOUS PRN
Status: DISCONTINUED | OUTPATIENT
Start: 2024-07-17 | End: 2024-07-17 | Stop reason: SDUPTHER

## 2024-07-17 RX ORDER — PROPOFOL 10 MG/ML
INJECTION, EMULSION INTRAVENOUS CONTINUOUS PRN
Status: DISCONTINUED | OUTPATIENT
Start: 2024-07-17 | End: 2024-07-17 | Stop reason: SDUPTHER

## 2024-07-17 RX ORDER — SODIUM CHLORIDE 9 MG/ML
INJECTION, SOLUTION INTRAVENOUS CONTINUOUS PRN
Status: DISCONTINUED | OUTPATIENT
Start: 2024-07-17 | End: 2024-07-17 | Stop reason: SDUPTHER

## 2024-07-17 RX ORDER — POTASSIUM CHLORIDE 20 MEQ/1
20 TABLET, EXTENDED RELEASE ORAL ONCE
Status: COMPLETED | OUTPATIENT
Start: 2024-07-17 | End: 2024-07-17

## 2024-07-17 RX ORDER — FENTANYL CITRATE 50 UG/ML
INJECTION, SOLUTION INTRAMUSCULAR; INTRAVENOUS PRN
Status: DISCONTINUED | OUTPATIENT
Start: 2024-07-17 | End: 2024-07-17 | Stop reason: SDUPTHER

## 2024-07-17 RX ADMIN — INSULIN GLARGINE 30 UNITS: 100 INJECTION, SOLUTION SUBCUTANEOUS at 21:30

## 2024-07-17 RX ADMIN — SODIUM CHLORIDE: 9 INJECTION, SOLUTION INTRAVENOUS at 10:07

## 2024-07-17 RX ADMIN — VANCOMYCIN HYDROCHLORIDE 1000 MG: 1 INJECTION, POWDER, LYOPHILIZED, FOR SOLUTION INTRAVENOUS at 10:16

## 2024-07-17 RX ADMIN — INSULIN LISPRO 4 UNITS: 100 INJECTION, SOLUTION INTRAVENOUS; SUBCUTANEOUS at 17:39

## 2024-07-17 RX ADMIN — INSULIN LISPRO 10 UNITS: 100 INJECTION, SOLUTION INTRAVENOUS; SUBCUTANEOUS at 17:39

## 2024-07-17 RX ADMIN — METHENAMINE HIPPURATE 1 G: 1000 TABLET ORAL at 18:18

## 2024-07-17 RX ADMIN — FENTANYL CITRATE 25 MCG: 50 INJECTION INTRAMUSCULAR; INTRAVENOUS at 10:17

## 2024-07-17 RX ADMIN — PSYLLIUM HUSK 1 PACKET: 3.4 POWDER ORAL at 21:30

## 2024-07-17 RX ADMIN — FENTANYL CITRATE 25 MCG: 50 INJECTION INTRAMUSCULAR; INTRAVENOUS at 10:27

## 2024-07-17 RX ADMIN — DIPHENHYDRAMINE HYDROCHLORIDE 25 MG: 50 INJECTION INTRAMUSCULAR; INTRAVENOUS at 10:13

## 2024-07-17 RX ADMIN — DOCUSATE SODIUM 100 MG: 100 CAPSULE, LIQUID FILLED ORAL at 13:30

## 2024-07-17 RX ADMIN — ATORVASTATIN CALCIUM 80 MG: 40 TABLET, FILM COATED ORAL at 13:49

## 2024-07-17 RX ADMIN — LIDOCAINE HYDROCHLORIDE 50 MG: 20 INJECTION, SOLUTION EPIDURAL; INFILTRATION; INTRACAUDAL; PERINEURAL at 10:13

## 2024-07-17 RX ADMIN — GUAIFENESIN 600 MG: 600 TABLET, EXTENDED RELEASE ORAL at 21:30

## 2024-07-17 RX ADMIN — FENTANYL CITRATE 25 MCG: 50 INJECTION INTRAMUSCULAR; INTRAVENOUS at 10:12

## 2024-07-17 RX ADMIN — THERA TABS 1 TABLET: TAB at 13:50

## 2024-07-17 RX ADMIN — PSYLLIUM HUSK 1 PACKET: 3.4 POWDER ORAL at 13:49

## 2024-07-17 RX ADMIN — CYCLOBENZAPRINE 5 MG: 10 TABLET, FILM COATED ORAL at 14:34

## 2024-07-17 RX ADMIN — SODIUM CHLORIDE, PRESERVATIVE FREE 10 ML: 5 INJECTION INTRAVENOUS at 21:31

## 2024-07-17 RX ADMIN — ACETAMINOPHEN 500 MG: 500 TABLET ORAL at 14:34

## 2024-07-17 RX ADMIN — CARVEDILOL 6.25 MG: 6.25 TABLET, FILM COATED ORAL at 18:17

## 2024-07-17 RX ADMIN — METHENAMINE HIPPURATE 1 G: 1000 TABLET ORAL at 13:49

## 2024-07-17 RX ADMIN — DOCUSATE SODIUM 100 MG: 100 CAPSULE, LIQUID FILLED ORAL at 21:30

## 2024-07-17 RX ADMIN — HYDROXYZINE HYDROCHLORIDE 25 MG: 25 TABLET, FILM COATED ORAL at 21:29

## 2024-07-17 RX ADMIN — PROPOFOL 50 MCG/KG/MIN: 10 INJECTION, EMULSION INTRAVENOUS at 10:21

## 2024-07-17 RX ADMIN — GUAIFENESIN 600 MG: 600 TABLET, EXTENDED RELEASE ORAL at 13:49

## 2024-07-17 RX ADMIN — CARVEDILOL 6.25 MG: 6.25 TABLET, FILM COATED ORAL at 13:49

## 2024-07-17 RX ADMIN — POTASSIUM CHLORIDE 20 MEQ: 1500 TABLET, EXTENDED RELEASE ORAL at 21:30

## 2024-07-17 RX ADMIN — SODIUM CHLORIDE, PRESERVATIVE FREE 10 ML: 5 INJECTION INTRAVENOUS at 13:51

## 2024-07-17 RX ADMIN — FENTANYL CITRATE 25 MCG: 50 INJECTION INTRAMUSCULAR; INTRAVENOUS at 10:37

## 2024-07-17 RX ADMIN — POTASSIUM CHLORIDE 40 MEQ: 1500 TABLET, EXTENDED RELEASE ORAL at 13:49

## 2024-07-17 RX ADMIN — ACETAMINOPHEN 500 MG: 500 TABLET ORAL at 21:29

## 2024-07-17 ASSESSMENT — PAIN DESCRIPTION - LOCATION
LOCATION: ABDOMEN
LOCATION: ABDOMEN

## 2024-07-17 ASSESSMENT — PAIN DESCRIPTION - ORIENTATION
ORIENTATION: LEFT
ORIENTATION: LEFT

## 2024-07-17 ASSESSMENT — PAIN SCALES - GENERAL
PAINLEVEL_OUTOF10: 0
PAINLEVEL_OUTOF10: 4
PAINLEVEL_OUTOF10: 0
PAINLEVEL_OUTOF10: 5

## 2024-07-17 ASSESSMENT — PAIN - FUNCTIONAL ASSESSMENT: PAIN_FUNCTIONAL_ASSESSMENT: ACTIVITIES ARE NOT PREVENTED

## 2024-07-17 ASSESSMENT — PAIN DESCRIPTION - PAIN TYPE: TYPE: ACUTE PAIN

## 2024-07-17 ASSESSMENT — ENCOUNTER SYMPTOMS: SHORTNESS OF BREATH: 1

## 2024-07-17 ASSESSMENT — PAIN DESCRIPTION - DESCRIPTORS: DESCRIPTORS: SORE

## 2024-07-17 NOTE — ANESTHESIA POSTPROCEDURE EVALUATION
Department of Anesthesiology  Postprocedure Note    Patient: Mgegan Rossi  MRN: [id number]  YOB: 1950  Date of evaluation: 7/17/2024    Procedure Summary       Date: 07/17/24 Room / Location: [facility] EP LAB 1 / [facility] CARDIAC CATH LAB    Anesthesia Start: 1007 Anesthesia Stop: 1152    Procedure: Remove & replace PPM gen biv multi leads Diagnosis: Encounter for pacemaker at end of battery life    Providers: Paulo Gardner MD Responsible Provider: Andrea Brewer MD    Anesthesia Type: MAC ASA Status: 3            Anesthesia Type: MAC    Reynaldo Phase I:      Reynaldo Phase II:      Anesthesia Post Evaluation    Patient location during evaluation: PACU  Patient participation: complete - patient participated  Level of consciousness: sleepy but conscious  Pain score: 0  Airway patency: patent  Nausea & Vomiting: no nausea and no vomiting  Cardiovascular status: blood pressure returned to baseline  Respiratory status: acceptable  Hydration status: euvolemic  Pain management: adequate    No notable events documented.

## 2024-07-17 NOTE — ANESTHESIA PRE PROCEDURE
Department of Anesthesiology  Preprocedure Note       Name:  Meggan Rossi   Age:  73 y.o.  :  1950                                          MRN:  [id number]         Date:  2024      Surgeon: Surgeon(s):  Paulo Gardner MD    Procedure: Procedure(s):  Remove & replace PPM gen biv multi leads    Medications prior to admission:   Prior to Admission medications    Medication Sig Start Date End Date Taking? Authorizing Provider   atorvastatin (LIPITOR) 80 MG tablet Take 1 tablet by mouth daily   Yes Asif Casanova MD   collagenase 250 UNIT/GM ointment Apply 1 g topically daily Apply topically daily.   Yes Asif Casanova MD   Insulin Pen Needle (UNIFINE PENTIPS) 31G X 5 MM MISC 1 each by Does not apply route 4 times daily   Yes Aisf Casanova MD   ENTRESTO  MG per tablet TAKE 1 TABLET BY MOUTH TWICE A DAY 7/10/24   Tila Jackson APRN - NP   LANTUS SOLOSTAR 100 UNIT/ML injection pen Inject 20 Units into the skin daily 24   Asif Casanova MD   apixaban (ELIQUIS) 5 MG TABS tablet Take 1 tablet by mouth 2 times daily    Asif Casanova MD   bumetanide (BUMEX) 2 MG tablet TAKE 1/2 TABLET BY MOUTH EVERY DAY  Patient taking differently: Take 1 tablet by mouth daily 24   Tila Jackson APRN - NP   carvedilol (COREG) 6.25 MG tablet TAKE 1 TABLET BY MOUTH TWICE A DAY 23   Sommer Guzmán DO   folic acid (FOLVITE) 1 MG tablet Take 1 tablet by mouth daily 23   Asif Casanova MD   HUMALOG KWIKPEN 100 UNIT/ML SOPN Inject 6 Units into the skin 3 times daily (before meals) 23   Asif Casanova MD   Albuterol Sulfate, sensor, 108 (90 Base) MCG/ACT AEPB Inhale into the lungs as needed    Asif Casanova MD   nystatin (NYAMYC) 425173 UNIT/GM powder Apply topically 2 times daily Apply topically 4 times daily.    Asif Casanova MD   ketoconazole (NIZORAL) 2 % shampoo Apply topically daily as needed for Itching Apply

## 2024-07-17 NOTE — PROGRESS NOTES
Cath holding summary  Patient escorted to cath holding from room #457, alert and oriented x 4, voicing no complaints. Placed on monitor.  NPO since MN.  Lab results, med rec and H&P reviewed on chart.  PIV x 1 inserted PTA. Family at bedside.    0815  TRANSFER - IN REPORT:  Verbal report received from inpatient RN on [place] (name) on Meggan D Rossi  being received from room 457(unit) for ordered procedure   Report consisted of patient’s Situation, Background, Assessment and   Recommendations(SBAR).   Information from the following report(s) SBAR, Recent Results, and Procedure Verification was reviewed with the receiving nurse.  Opportunity for questions and clarification was provided.    Assessment completed upon patient’s arrival to unit and care assumed.     1000  TRANSFER - OUT REPORT:  Verbal report given to Yanni (name) on Meggan D Rossi  being transferred to EP Lab (unit) for ordered procedure  Report consisted of patient’s Situation, Background, Assessment and   Recommendations(SBAR).   Information from the following report(s) SBAR, Intake/Output, MAR, and Pre Procedure Checklist was reviewed with the receiving nurse.  Lines:   Peripheral IV 07/10/24 Left;Posterior Hand (Active)   Site Assessment Clean, dry & intact 07/17/24 0800   Line Status Flushed 07/17/24 0800   Line Care Connections checked and tightened 07/17/24 0800   Phlebitis Assessment No symptoms 07/17/24 0800   Infiltration Assessment 0 07/17/24 0800   Alcohol Cap Used Yes 07/17/24 0800   Dressing Status Clean, dry & intact 07/17/24 0800   Dressing Type Transparent 07/17/24 0800     Opportunity for questions and clarification was provided.    Patient transported with:  Tech    1150  TRANSFER - IN REPORT:  Verbal report received from [facility] (name) on Meggan D Rossi  being received from EP Lab (unit) for ordered procedure   Report consisted of patient’s Situation, Background, Assessment and   Recommendations(SBAR).   Information from the following

## 2024-07-17 NOTE — PROGRESS NOTES
[facility]  (A Division of [facility])      Consultation Note      Date of Admission: 7/10/2024    Date of Note: 7/17/2024      Reason for Referral: Bilateral lower extremity heel wounds  Referring Physician: Dr. Cody Barrera from this admission:   None    Current Antimicrobials:    Prior Antimicrobials:  None Azithromycin 7/90 x 1 dose       Assessment:         Osteomyelitis and diabetic foot ulcer of the left calcaneus: Status post I&D with Calc ectomy on 6/4.  Surgical cultures positive for Enterococcus faecalis.  Biopsy consistent with osteomyelitis.  Completed therapy on 7/5 with IV vancomycin..   -Overall wound to the left heel as well as the right seem to be clean although there is some drainage at the base of the left heel.  Acute on chronic HFmrEF: Initial BNP 1330.  Nonischemic dilated cardiomyopathy  ALEXA with CKD stage IV  Diabetes mellitus type 2  Chronic nonocclusive left femoral DVT on Eliquis  Plan:   Continue with local wound care.  Wound care team is following.   -Discussed with nursing to ensure that dressings are changed every 3 days.  Last dressing change was on 7/12    Discussed with Dr. Jj Montenegro DO  [facility]  [address], [address], [place] [address]  Office: [phone], Ext [phone]      Lines / Catheters:  Peripheral      Subjective:   Seen and examined in cath lab holding following pacemaker exchange. Still a little sleepy from sedation. No overnight events.    Tmax 98.7  WBC 7.0    HPI:  Ms. Rossi is a very pleasant 73-year-old female with a past medical history of anemia of chronic disease, chronic systolic heart failure, diabetes mellitus type 2 with neuropathy chronic kidney disease, prior DVT, dyslipidemia, and AICD who was recently admitted to [facility] in early June secondary to left heel osteomyelitis.  She has a history of diabetic neuropathy with decreased  mobility which had resulted in bilateral pressure sores.  She follows with podiatry and had had a biopsy and culture in the outpatient setting.  She was then admitted and underwent Ostia ectomy and Calc ectomy on .  With cultures positive for Enterococcus faecalis.  Biopsy consistent with acute osteomyelitis.  She was treated with vancomycin through .  She continues to have routine wound care every other day during the week with home health.  She recalls that they are using Aquacel and possibly collagenase particularly to the left heel.  She has not had any new fevers or chills.  She did initially have some left lower extremity swelling which has since improved.    She is currently admitted with increasing shortness of breath that had started about 4 days prior to her current admission.  She is being followed by cardiology as well as nephrology for management of volume overload related to acute on chronic HFmrEF.  Plans are in place for pacemaker exchange in the next 2 to 3 days if patient is able to lie flat.           Objective:      BP (!) 142/76   Pulse 71   Temp 98.1 °F (36.7 °C) (Oral)   Resp 19   Ht 1.702 m (5' 7\")   Wt 113 kg (249 lb 1.9 oz)   LMP  (LMP Unknown)   SpO2 96%   BMI 39.02 kg/m²   Temp (24hrs), Av.3 °F (36.8 °C), Min:98.1 °F (36.7 °C), Max:98.7 °F (37.1 °C)        General:   awake alert and oriented, appears stated age   Skin:    no jaundice, new clean dressings in place to bilateral feet and heels.  No strikethrough   HEENT:  Normocephalic, atraumatic, PERRL, EOMI, no scleral icterus; no conjunctival hemmohage;    Lymph Nodes:   no cervical or inguinal adenopathy   Lungs:   non-labored, bilaterally clear to aspiration   Heart:  RRR, s1 and s2; no murmurs, 1+ left lower extremity edema, + pedal pulses   Abdomen:  soft, non-distended, active bowel sounds. Non-tender   Genitourinary:  deferred   Extremities:   no clubbing, cyanosis; no joint effusions or swelling; muscle mass

## 2024-07-17 NOTE — PROGRESS NOTES
[facility] - Progress Note    Admit Date: 7/10/2024      Assessment:     - Acute on chronic HFmrEF: Presented with shortness of breath and  bilateral lower extremity edema. Nt probnp  1330. Patient diuresed well on bumetanide infusion which has since been stopped.  Her breathing is back to baseline.  - Nonischemic Dilated Cardiomyopathy: Echo 7/12/24 EF 35-40% global hypokinesis. Echo 5/31/2024 EF ~ 45%, abnormal septal motion consistent w/ paced rhythm, normal diastolic function.  EF has been as low as 25%. [facility] 8/1996 with widely patent coronary arteries. On bumex and coreg as outpatient. GDMT limited due to CKD.  - CHB s/p BIV PPM: Device nearing elective replacement time. Scheduled to have device replaced 7/24/24. Device check 7/12/24 by MDT rep; Normal device function,  no significant arrhythmias, less than one month remaining until device reaches elective replacement time.  Patient with an abdominal generator implant with epicardial leads.  - ALEXA on CKD4, primary nephrologist DR. KINGSLEY Doan. Creatinine on admission 2.3, improving  - HTN; normotensive   - T2DM  - History of chronic, nonocclusive left common femoral vein DVT, on Eliquis. Most recent PVL 7/12/2024 with no evidence of acute deep or supervision vein thrombosis.  Eliquis currently on hold.  - History of CVA with right sided deficits. Patient is bedbound. On statin   - Recent osteomyelitis, bilateral heel wounds, status post debridement, and left partial calcanectomy 6/4/2024. Antibiotic course completed.  No active infection per ID.  - History of chronic bladder retention from spinal cord injury, status post suprapubic catheter placement 2023. Followed by urology as outpatient     Primary cardiologist: Dr. Guzmán           Plan:     Will proceed with biventricular pacemaker generator exchange today with the assistance of anesthesia for deep sedation  Antibiotic options are limited due to multiple allergies.  Discussed at length with  patient and family, they are willing to proceed.    Subjective:     No new complaints.  Breathing has improved.  No leg swelling, no fevers or chills    Objective:      Patient Vitals for the past 8 hrs:   Temp Pulse Resp BP SpO2   07/17/24 0753 98.1 °F (36.7 °C) 71 20 (!) 125/55 97 %   07/17/24 0413 98.1 °F (36.7 °C) 63 20 (!) 107/56 97 %         Patient Vitals for the past 96 hrs:   Weight   07/16/24 0600 113 kg (249 lb 1.9 oz)   07/14/24 0600 112.4 kg (247 lb 12.8 oz)       TELE:  100% biventricular paced rhythm               Current Facility-Administered Medications   Medication Dose Route Frequency    docusate sodium (COLACE) capsule 100 mg  100 mg Oral BID    psyllium husk-aspartame (METAMUCIL FIBER) packet 1 packet  1 packet Oral BID    phenylephrine-mineral oil-petrolatum (PREPARATION H) rectal ointment   Rectal BID PRN    heparin (porcine) injection 5,000 Units  5,000 Units SubCUTAneous 3 times per day    potassium chloride (KLOR-CON M) extended release tablet 40 mEq  40 mEq Oral Daily    guaiFENesin (MUCINEX) extended release tablet 600 mg  600 mg Oral BID    insulin lispro (HUMALOG,ADMELOG) injection vial 10 Units  10 Units SubCUTAneous TID AC    insulin glargine (LANTUS) injection vial 30 Units  30 Units SubCUTAneous Nightly    acetaminophen (TYLENOL) tablet 500 mg  500 mg Oral Q4H PRN    cyclobenzaprine (FLEXERIL) tablet 5 mg  5 mg Oral TID PRN    camphor-menthol-methyl salicylate (BENGAY ULTRA STRENGTH) 4-10-30 % cream   Apply externally TID PRN    [Held by provider] apixaban (ELIQUIS) tablet 5 mg  5 mg Oral BID    carvedilol (COREG) tablet 6.25 mg  6.25 mg Oral BID WC    hydrOXYzine HCl (ATARAX) tablet 25 mg  25 mg Oral Q6H PRN    methenamine (HIPREX) tablet 1 g (Patient Supplied)  1 g Oral BID WC    multivitamin 1 tablet  1 tablet Oral Daily    sodium chloride flush 0.9 % injection 5-40 mL  5-40 mL IntraVENous 2 times per day    sodium chloride flush 0.9 % injection 5-40 mL  5-40 mL IntraVENous PRN

## 2024-07-17 NOTE — PROGRESS NOTES
Comprehensive Nutrition Assessment    Type and Reason for Visit:  Patient Education, Consult, RD Nutrition Re-Screen/LOS    Nutrition Recommendations/Plan:   Continue current diet as tolerated.  No ONS recommended at this time  Continue multivitamin supplementation daily, Daily wts.  Continue to monitor tolerance of PO, weight, labs, and plan of care during admission.     Malnutrition Assessment:  Malnutrition Status:  Insufficient data (07/18/24 1418)    Context:  Acute Illness     Findings of the 6 clinical characteristics of malnutrition:  Energy Intake:  Mild decrease in energy intake (Comment) (Decreased appetite, consuming 2 meals per day prior to admission.)  Weight Loss:  No significant weight loss     Body Fat Loss:  Unable to assess     Muscle Mass Loss:  Unable to assess    Fluid Accumulation:  Unable to assess     Strength:  Not Performed    Nutrition History and Allergies:   Follwoing a generalized diet with fluid restriciton prior to admission. Allergies: Blueberries, strawberries, citrus and tomatoes; cause itching.    Past Medical History:   Diagnosis Date    Abnormal nuclear cardiac imaging test 06/19/2015    Fixed distal apical, distal septal defect more likely due to RV pacing than prior infarct.  No ischemia.  EF 46%.  RWMA c/w RV pacing.  Nondiagnostic EKG on pharm stress test.      Acute paraplegia (HCC) 4/20/2017    Benign hypertensive heart disease with systolic CHF, NYHA class 2 (HCC) 9/5/2012    Biventricular implantable cardioverter-defibrillator in situ 04/28/2005    Upgraded to BiV AICD; gen change 4/2008; pocket revision 10/2009; Abdominal - done on 8/22/2012 by Dr. Ashok Myers     Chronic anemia 9/5/2012    Chronic systolic heart failure (HCC)     Decreased calculated glomerular filtration rate (GFR) 3/30/2017    Calculated GFR equivalent to that of CKD stage 3 = 30-59 ml/min    Diabetic neuropathy associated with type 2 diabetes mellitus (HCC) 6/28/2011    Difficult airway for

## 2024-07-17 NOTE — PLAN OF CARE
Problem: Pain  Goal: Verbalizes/displays adequate comfort level or baseline comfort level  Outcome: Progressing  Flowsheets  Taken 7/16/2024 2011 by Temitope Denny, RN  Verbalizes/displays adequate comfort level or baseline comfort level:   Encourage patient to monitor pain and request assistance   Assess pain using appropriate pain scale  Taken 7/16/2024 1508 by Blessing Nguyen, ELSIE  Verbalizes/displays adequate comfort level or baseline comfort level:   Encourage patient to monitor pain and request assistance   Assess pain using appropriate pain scale     Problem: Skin/Tissue Integrity  Goal: Absence of new skin breakdown  Description: 1.  Monitor for areas of redness and/or skin breakdown  2.  Assess vascular access sites hourly  3.  Every 4-6 hours minimum:  Change oxygen saturation probe site  4.  Every 4-6 hours:  If on nasal continuous positive airway pressure, respiratory therapy assess nares and determine need for appliance change or resting period.  Outcome: Progressing     Problem: ABCDS Injury Assessment  Goal: Absence of physical injury  Outcome: Progressing  Flowsheets (Taken 7/16/2024 2011)  Absence of Physical Injury: Implement safety measures based on patient assessment     Problem: Safety - Adult  Goal: Free from fall injury  Outcome: Progressing  Flowsheets (Taken 7/16/2024 2011)  Free From Fall Injury:   Instruct family/caregiver on patient safety   Based on caregiver fall risk screen, instruct family/caregiver to ask for assistance with transferring infant if caregiver noted to have fall risk factors     Problem: Chronic Conditions and Co-morbidities  Goal: Patient's chronic conditions and co-morbidity symptoms are monitored and maintained or improved  Outcome: Progressing  Flowsheets (Taken 7/16/2024 2011)  Care Plan - Patient's Chronic Conditions and Co-Morbidity Symptoms are Monitored and Maintained or Improved:   Monitor and assess patient's chronic conditions and comorbid symptoms for  stability, deterioration, or improvement   Collaborate with multidisciplinary team to address chronic and comorbid conditions and prevent exacerbation or deterioration   Update acute care plan with appropriate goals if chronic or comorbid symptoms are exacerbated and prevent overall improvement and discharge

## 2024-07-17 NOTE — PROGRESS NOTES
1940 Bedside shift report given to Temitope Denny RN from Blessing ZIMMERMAN. Report including the following information SBAR, Kardex, recent results, MAR, intake/output and cardiac rhythm AV Paced.     2013 Pt assessment and vitals done, pt complains of no pain, all pt care needs met.    2143 Pt medicated per MAR     2313 Pt vitals taken pt complains of no pain.    0011 Pt reassessment done, pt complains of no pain     0413 Pt vitals taken, pt reassessment done.     0700 Bedside shift report given to Kate ZIMMERMAN by Temitope ZIMMERMAN, all question and concerns answered.

## 2024-07-17 NOTE — CARE COORDINATION
Pre-cert Request   7/17/2024, 1:39 PM    Patient Name: Meggan Rossi                   YOB: 1950    Submitted via [employer].  Requested Facility:  [facility] Admit Date to SNF:  7/18/24  Pending Auth #:  [id number]    Silviajerry Banks  Case Management Department  Ph: [phone]

## 2024-07-17 NOTE — CARE COORDINATION
[facility] has accepted pt. Email sent to Shweta Banks in Case Management to please start auth for pt.     Tammi HUERTAN,RN, [facility]  Case Management  [phone]

## 2024-07-17 NOTE — PROGRESS NOTES
Physical Therapy  Pt not seen for skilled PT due to:    []  Nausea/vomiting  []  Eating  []  Pain  []  Pt lethargic  [x]  Off Unit @ [facility] 8:38AM and 10:05AM  Other:     Will f/u later as schedule allows. Thank you.  Maximilian Hernandez, PT, DPT

## 2024-07-17 NOTE — CARE COORDINATION
Spoke with pts luz elena Garay, she is concerned that pt may need rehab prior to coming home. [facility] is [facility]. Clinicals uploaded to [facility] via Epic Link. Additional SNF referrals sent via [facility] for review for acceptance.    Tammi EDOUARD,RN, [facility]  Case Management  [phone]

## 2024-07-17 NOTE — PROGRESS NOTES
0820-patient taken down to cath lab for pacemaker generator exchange.      1330-patient returned from cath lab, Leo mepilex noted to Left side of abdomen where puncture site is for pacemaker battery exchange, dressing is  clean/dry and intact. Patient alert, oriented, vital signs stable, glucose stable. at bedside speaking with patient.  Received telephone report from ELSIE Rodarte.    1400-bilateral foot dressings changed at this time.

## 2024-07-17 NOTE — PLAN OF CARE
Problem: Pain  Goal: Verbalizes/displays adequate comfort level or baseline comfort level  7/17/2024 1217 by Christofer Staton RN  Outcome: Progressing  7/17/2024 0505 by Temitope Denny RN  Outcome: Progressing  Flowsheets  Taken 7/16/2024 2011 by Temitope Denny RN  Verbalizes/displays adequate comfort level or baseline comfort level:   Encourage patient to monitor pain and request assistance   Assess pain using appropriate pain scale  Taken 7/16/2024 1508 by Blessing Nguyen RN  Verbalizes/displays adequate comfort level or baseline comfort level:   Encourage patient to monitor pain and request assistance   Assess pain using appropriate pain scale     Problem: Skin/Tissue Integrity  Goal: Absence of new skin breakdown  Description: 1.  Monitor for areas of redness and/or skin breakdown  2.  Assess vascular access sites hourly  3.  Every 4-6 hours minimum:  Change oxygen saturation probe site  4.  Every 4-6 hours:  If on nasal continuous positive airway pressure, respiratory therapy assess nares and determine need for appliance change or resting period.  7/17/2024 1217 by Christofer Staton RN  Outcome: Progressing  7/17/2024 0505 by Temitope Denny RN  Outcome: Progressing     Problem: ABCDS Injury Assessment  Goal: Absence of physical injury  7/17/2024 1217 by Christofer Staton RN  Outcome: Progressing  7/17/2024 0505 by Temitope Denny RN  Outcome: Progressing  Flowsheets (Taken 7/16/2024 2011)  Absence of Physical Injury: Implement safety measures based on patient assessment     Problem: Safety - Adult  Goal: Free from fall injury  7/17/2024 1217 by Christofer Staton RN  Outcome: Progressing  7/17/2024 0505 by Temitope Denny RN  Outcome: Progressing  Flowsheets (Taken 7/16/2024 2011)  Free From Fall Injury:   Instruct family/caregiver on patient safety   Based on caregiver fall risk screen, instruct family/caregiver to ask for assistance with transferring infant if caregiver noted to have fall risk factors      Problem: Chronic Conditions and Co-morbidities  Goal: Patient's chronic conditions and co-morbidity symptoms are monitored and maintained or improved  7/17/2024 1217 by Christofer Staton, RN  Outcome: Progressing  Flowsheets (Taken 7/17/2024 0745)  Care Plan - Patient's Chronic Conditions and Co-Morbidity Symptoms are Monitored and Maintained or Improved: Monitor and assess patient's chronic conditions and comorbid symptoms for stability, deterioration, or improvement  7/17/2024 0505 by Temitope Denny, RN  Outcome: Progressing  Flowsheets (Taken 7/16/2024 2011)  Care Plan - Patient's Chronic Conditions and Co-Morbidity Symptoms are Monitored and Maintained or Improved:   Monitor and assess patient's chronic conditions and comorbid symptoms for stability, deterioration, or improvement   Collaborate with multidisciplinary team to address chronic and comorbid conditions and prevent exacerbation or deterioration   Update acute care plan with appropriate goals if chronic or comorbid symptoms are exacerbated and prevent overall improvement and discharge

## 2024-07-17 NOTE — PROGRESS NOTES
[facility]  Progress Note    Patient: Meggan Rossi Age: 73 y.o. : 1950 MR#: [id number] SSN: [id number]  Date: 2024         DVT Prophylaxis:  []Lovenox  []Hep SQ  []SCDs  []Coumadin   []On Heparin gtt [x]PO anticoagulant      Subjective:     Patient lying in the bed, feeling better, slight pain and pacemaker site.    Objective:   VS: BP (!) 148/64   Pulse 74   Temp 98 °F (36.7 °C) (Oral)   Resp 15   Ht 1.702 m (5' 7\")   Wt 113 kg (249 lb 1.9 oz)   LMP  (LMP Unknown)   SpO2 98%   BMI 39.02 kg/m²    Tmax/24hrs: Temp (24hrs), Av.2 °F (36.8 °C), Min:98 °F (36.7 °C), Max:98.5 °F (36.9 °C)    Intake/Output Summary (Last 24 hours) at 2024 1544  Last data filed at 2024 1146  Gross per 24 hour   Intake 1250 ml   Output 1190 ml   Net 60 ml        PHYSICAL EXAM  General:  Awake, alert  Cardiovascular:  S1S2+, RRR  Pulmonary: Bilateral basal minimal Rales  GI:  Soft, BS+, NT, ND  Extremities:  2+ edema  Alert awake Olympus 4, moves all extremities  Pacemaker site surgical center clean  Suprapubic catheter in place    Current Facility-Administered Medications   Medication Dose Route Frequency    docusate sodium (COLACE) capsule 100 mg  100 mg Oral BID    psyllium husk-aspartame (METAMUCIL FIBER) packet 1 packet  1 packet Oral BID    phenylephrine-mineral oil-petrolatum (PREPARATION H) rectal ointment   Rectal BID PRN    potassium chloride (KLOR-CON M) extended release tablet 40 mEq  40 mEq Oral Daily    guaiFENesin (MUCINEX) extended release tablet 600 mg  600 mg Oral BID    insulin lispro (HUMALOG,ADMELOG) injection vial 10 Units  10 Units SubCUTAneous TID AC    insulin glargine (LANTUS) injection vial 30 Units  30 Units SubCUTAneous Nightly    acetaminophen (TYLENOL) tablet 500 mg  500 mg Oral Q4H PRN    cyclobenzaprine (FLEXERIL) tablet 5 mg  5 mg Oral TID PRN    camphor-menthol-methyl salicylate (BENGAY ULTRA STRENGTH) 4-10-30 % cream   Apply

## 2024-07-17 NOTE — PROGRESS NOTES
1141 97.9 °F (36.6 °C) 70 18 134/68 97 %          Weight change:      07/15 1901 - 07/17 0700  In: 1320 [P.O.:1320]  Out: 2175 [Urine:2175]    Intake/Output Summary (Last 24 hours) at 7/17/2024 1019  Last data filed at 7/16/2024 2104  Gross per 24 hour   Intake 840 ml   Output 1175 ml   Net -335 ml       Physical Exam:   General: comfortable  HEENT sclera anicteric, supple neck, no thyromegaly  CVS: S1S2 heard,  no rub  RS: + air entry b/l  Abd: Soft, Non tender, Not distended, Positive bowel sounds, no organomegaly, no CVA / supra pubic tenderness  Neuro: non focal, awake, alert , CN II-XII are grossly intact  Extrm: no cyanosis, clubbing . No edema  Skin: no visible  Rash  Musculoskeletal: No gross joints or bone deformities         Data Review:     LABS:   Hematology:   Recent Labs     07/15/24  0239 07/16/24  0323   WBC 6.3 7.0   HGB 9.0* 8.9*   HCT 27.8* 27.7*       Chemistry:   Recent Labs     07/15/24  0239 07/16/24  0323   BUN 69* 74*   K 3.7 3.4*    138    102   CO2 30 30              Procedures/imaging: see electronic medical records for all procedures, Xrays and details which were not copied into this note but were reviewed prior to creation of Plan          Assessment & Plan:     See above        PETE PUENTES MD  7/17/2024  10:19 AM

## 2024-07-18 LAB
ALBUMIN SERPL-MCNC: 2.9 G/DL (ref 3.4–5)
ANION GAP SERPL CALC-SCNC: 7 MMOL/L (ref 3–18)
BUN SERPL-MCNC: 78 MG/DL (ref 7–18)
BUN/CREAT SERPL: 32 (ref 12–20)
CALCIUM SERPL-MCNC: 9.3 MG/DL (ref 8.5–10.1)
CHLORIDE SERPL-SCNC: 103 MMOL/L (ref 100–111)
CO2 SERPL-SCNC: 27 MMOL/L (ref 21–32)
CREAT SERPL-MCNC: 2.43 MG/DL (ref 0.6–1.3)
GLUCOSE BLD STRIP.AUTO-MCNC: 112 MG/DL (ref 70–110)
GLUCOSE BLD STRIP.AUTO-MCNC: 168 MG/DL (ref 70–110)
GLUCOSE BLD STRIP.AUTO-MCNC: 201 MG/DL (ref 70–110)
GLUCOSE BLD STRIP.AUTO-MCNC: 284 MG/DL (ref 70–110)
GLUCOSE SERPL-MCNC: 112 MG/DL (ref 74–99)
PHOSPHATE SERPL-MCNC: 4.7 MG/DL (ref 2.5–4.9)
POTASSIUM SERPL-SCNC: 3.7 MMOL/L (ref 3.5–5.5)
SODIUM SERPL-SCNC: 137 MMOL/L (ref 136–145)

## 2024-07-18 PROCEDURE — 6370000000 HC RX 637 (ALT 250 FOR IP): Performed by: HOSPITALIST

## 2024-07-18 PROCEDURE — 99232 SBSQ HOSP IP/OBS MODERATE 35: CPT | Performed by: HOSPITALIST

## 2024-07-18 PROCEDURE — 6370000000 HC RX 637 (ALT 250 FOR IP): Performed by: INTERNAL MEDICINE

## 2024-07-18 PROCEDURE — 80069 RENAL FUNCTION PANEL: CPT

## 2024-07-18 PROCEDURE — 6370000000 HC RX 637 (ALT 250 FOR IP): Performed by: STUDENT IN AN ORGANIZED HEALTH CARE EDUCATION/TRAINING PROGRAM

## 2024-07-18 PROCEDURE — 94761 N-INVAS EAR/PLS OXIMETRY MLT: CPT

## 2024-07-18 PROCEDURE — 2580000003 HC RX 258: Performed by: HOSPITALIST

## 2024-07-18 PROCEDURE — 36415 COLL VENOUS BLD VENIPUNCTURE: CPT

## 2024-07-18 PROCEDURE — 1100000000 HC RM PRIVATE

## 2024-07-18 PROCEDURE — 97530 THERAPEUTIC ACTIVITIES: CPT

## 2024-07-18 PROCEDURE — 82962 GLUCOSE BLOOD TEST: CPT

## 2024-07-18 RX ORDER — BUMETANIDE 1 MG/1
2 TABLET ORAL 2 TIMES DAILY
Status: DISCONTINUED | OUTPATIENT
Start: 2024-07-19 | End: 2024-07-18

## 2024-07-18 RX ORDER — DIPHENHYDRAMINE HCL 25 MG
25 CAPSULE ORAL EVERY 6 HOURS PRN
Status: DISCONTINUED | OUTPATIENT
Start: 2024-07-18 | End: 2024-07-19 | Stop reason: HOSPADM

## 2024-07-18 RX ORDER — BUMETANIDE 1 MG/1
2 TABLET ORAL 2 TIMES DAILY
Status: DISCONTINUED | OUTPATIENT
Start: 2024-07-18 | End: 2024-07-19 | Stop reason: HOSPADM

## 2024-07-18 RX ADMIN — POLYETHYLENE GLYCOL 3350 17 G: 17 POWDER, FOR SOLUTION ORAL at 19:42

## 2024-07-18 RX ADMIN — HYDROXYZINE HYDROCHLORIDE 25 MG: 25 TABLET, FILM COATED ORAL at 04:04

## 2024-07-18 RX ADMIN — GUAIFENESIN 600 MG: 600 TABLET, EXTENDED RELEASE ORAL at 08:39

## 2024-07-18 RX ADMIN — INSULIN LISPRO 10 UNITS: 100 INJECTION, SOLUTION INTRAVENOUS; SUBCUTANEOUS at 12:15

## 2024-07-18 RX ADMIN — CARVEDILOL 6.25 MG: 6.25 TABLET, FILM COATED ORAL at 08:40

## 2024-07-18 RX ADMIN — INSULIN LISPRO 5 UNITS: 100 INJECTION, SOLUTION INTRAVENOUS; SUBCUTANEOUS at 08:49

## 2024-07-18 RX ADMIN — INSULIN LISPRO 2 UNITS: 100 INJECTION, SOLUTION INTRAVENOUS; SUBCUTANEOUS at 12:15

## 2024-07-18 RX ADMIN — METHENAMINE HIPPURATE 1 G: 1000 TABLET ORAL at 08:46

## 2024-07-18 RX ADMIN — SODIUM CHLORIDE, PRESERVATIVE FREE 10 ML: 5 INJECTION INTRAVENOUS at 19:50

## 2024-07-18 RX ADMIN — BUMETANIDE 2 MG: 1 TABLET ORAL at 16:51

## 2024-07-18 RX ADMIN — INSULIN LISPRO 10 UNITS: 100 INJECTION, SOLUTION INTRAVENOUS; SUBCUTANEOUS at 16:51

## 2024-07-18 RX ADMIN — THERA TABS 1 TABLET: TAB at 08:40

## 2024-07-18 RX ADMIN — CARVEDILOL 6.25 MG: 6.25 TABLET, FILM COATED ORAL at 16:51

## 2024-07-18 RX ADMIN — PSYLLIUM HUSK 1 PACKET: 3.4 POWDER ORAL at 19:42

## 2024-07-18 RX ADMIN — POTASSIUM CHLORIDE 40 MEQ: 1500 TABLET, EXTENDED RELEASE ORAL at 08:39

## 2024-07-18 RX ADMIN — ACETAMINOPHEN 500 MG: 500 TABLET ORAL at 19:36

## 2024-07-18 RX ADMIN — GUAIFENESIN 600 MG: 600 TABLET, EXTENDED RELEASE ORAL at 19:41

## 2024-07-18 RX ADMIN — DOCUSATE SODIUM 100 MG: 100 CAPSULE, LIQUID FILLED ORAL at 08:40

## 2024-07-18 RX ADMIN — INSULIN GLARGINE 30 UNITS: 100 INJECTION, SOLUTION SUBCUTANEOUS at 20:40

## 2024-07-18 RX ADMIN — METHENAMINE HIPPURATE 1 G: 1000 TABLET ORAL at 16:51

## 2024-07-18 RX ADMIN — ATORVASTATIN CALCIUM 80 MG: 40 TABLET, FILM COATED ORAL at 08:40

## 2024-07-18 RX ADMIN — CYCLOBENZAPRINE 5 MG: 10 TABLET, FILM COATED ORAL at 19:38

## 2024-07-18 RX ADMIN — BUMETANIDE 2 MG: 1 TABLET ORAL at 08:39

## 2024-07-18 RX ADMIN — DOCUSATE SODIUM 100 MG: 100 CAPSULE, LIQUID FILLED ORAL at 19:50

## 2024-07-18 RX ADMIN — SODIUM CHLORIDE, PRESERVATIVE FREE 10 ML: 5 INJECTION INTRAVENOUS at 08:46

## 2024-07-18 ASSESSMENT — PAIN SCALES - GENERAL
PAINLEVEL_OUTOF10: 0
PAINLEVEL_OUTOF10: 0
PAINLEVEL_OUTOF10: 8
PAINLEVEL_OUTOF10: 0

## 2024-07-18 ASSESSMENT — PAIN DESCRIPTION - DESCRIPTORS: DESCRIPTORS: STABBING

## 2024-07-18 ASSESSMENT — PAIN DESCRIPTION - LOCATION: LOCATION: ABDOMEN

## 2024-07-18 ASSESSMENT — PAIN DESCRIPTION - ORIENTATION: ORIENTATION: LEFT

## 2024-07-18 NOTE — PLAN OF CARE
Problem: Physical Therapy - Adult  Goal: By Discharge: Performs mobility at highest level of function for planned discharge setting.  See evaluation for individualized goals.  Description: Physical Therapy Goals:  Initiated 7/15/2024 to be met within 7-10 days.    1.  Patient will move from supine to sit and sit to supine  and roll side to side in bed with supervision/set-up.    2.  Patient will transfer from bed to chair and chair to bed with supervision/set-up using the least restrictive device.  3.  Patient will perform sit EOB with good balance for 10 minutes in prep for OOB activity.     PLOF: Patient was able to get EOB/OOB to electric WC with supervision of daughter. Prevalon boots must be worn at all times due to feet wounds.       Outcome: Progressing   PHYSICAL THERAPY TREATMENT    Patient: Meggan Rossi (73 y.o. female)  Date: 7/18/2024  Diagnosis: Heart failure (HCC) [I50.9] Acute on chronic congestive heart failure (HCC)  Procedure(s) (LRB):  Remove & replace PPM gen biv multi leads (N/A) 1 Day Post-Op  Precautions: Fall Risk, Other (comment) (prevlon boots must be worn at all times due to wounds on B heels accrding to PCP),     ASSESSMENT:  Pt cleared by nursing prior to session. Pt sitting up in bed with HOB elevated, in NAD, and agreeable to tx. Daughter at bedside. Pt reported increased abdominal pain following procedure yesterday. Pt was agreeable to transfer to EOB. Pt required mod-max A to transfer to EOB with HOB elevated. Pt reported increased pain with mobility and requested to return to bed. Pt required min A to return to bed and position for comfort. Pts daughter and Pt had questions regarding getting an accessible recliner chair at home. Therapist referred to case management or DME vendor for assistance. All needs left within reach.    Progression toward goals:   []      Improving appropriately and progressing toward goals  [x]      Improving slowly and progressing toward goals  []

## 2024-07-18 NOTE — PROGRESS NOTES
(TYLENOL) tablet 500 mg  500 mg Oral Q4H PRN    cyclobenzaprine (FLEXERIL) tablet 5 mg  5 mg Oral TID PRN    camphor-menthol-methyl salicylate (BENGAY ULTRA STRENGTH) 4-10-30 % cream   Apply externally TID PRN    carvedilol (COREG) tablet 6.25 mg  6.25 mg Oral BID WC    hydrOXYzine HCl (ATARAX) tablet 25 mg  25 mg Oral Q6H PRN    methenamine (HIPREX) tablet 1 g (Patient Supplied)  1 g Oral BID WC    multivitamin 1 tablet  1 tablet Oral Daily    sodium chloride flush 0.9 % injection 5-40 mL  5-40 mL IntraVENous 2 times per day    sodium chloride flush 0.9 % injection 5-40 mL  5-40 mL IntraVENous PRN    0.9 % sodium chloride infusion   IntraVENous PRN    ondansetron (ZOFRAN-ODT) disintegrating tablet 4 mg  4 mg Oral Q8H PRN    Or    ondansetron (ZOFRAN) injection 4 mg  4 mg IntraVENous Q6H PRN    polyethylene glycol (GLYCOLAX) packet 17 g  17 g Oral Daily PRN    glucose chewable tablet 16 g  4 tablet Oral PRN    dextrose bolus 10% 125 mL  125 mL IntraVENous PRN    Or    dextrose bolus 10% 250 mL  250 mL IntraVENous PRN    glucagon (rDNA) injection 1 mg  1 mg SubCUTAneous PRN    dextrose 10 % infusion   IntraVENous Continuous PRN    insulin lispro (HUMALOG,ADMELOG) injection vial 0-8 Units  0-8 Units SubCUTAneous TID WC    insulin lispro (HUMALOG,ADMELOG) injection vial 0-4 Units  0-4 Units SubCUTAneous Nightly    atorvastatin (LIPITOR) tablet 80 mg  80 mg Oral Daily        Labs:    Recent Results (from the past 24 hour(s))   Renal Function Panel    Collection Time: 07/17/24  2:40 PM   Result Value Ref Range    Sodium 138 136 - 145 mmol/L    Potassium 3.3 (L) 3.5 - 5.5 mmol/L    Chloride 101 100 - 111 mmol/L    CO2 29 21 - 32 mmol/L    Anion Gap 8 3.0 - 18 mmol/L    Glucose 219 (H) 74 - 99 mg/dL    BUN 72 (H) 7.0 - 18 MG/DL    Creatinine 2.45 (H) 0.6 - 1.3 MG/DL    BUN/Creatinine Ratio 29 (H) 12 - 20      Est, Glom Filt Rate 20 (L) >60 ml/min/1.73m2    Calcium 9.8 8.5 - 10.1 MG/DL    Phosphorus 4.6 2.5 - 4.9 MG/DL     has been weaned off oxygen supplementation    3.  Stage IV chronic kidney disease  Monitor renal function  Nephrology is following    4.  Type 2 diabetes mellitus with hyperglycemia  Continue Lantus and sliding scale insulin    5.  History of complete heart block  Post generator change 7/17/2024, patient tolerated procedure well.  Will resume Eliquis from tomorrow    6.  History of left lower extremity DVT  Eliquis on hold for pacemaker generator change, will place patient on subcu heparin for DVT prophylaxis    7.  Anemia of chronic disease  Continue to monitor hemoglobin and hematocrit.    8.  Hyperlipidemia  Continue atorvastatin    9.  History of hemorrhoids, continue bowel regimen and Preparation H    PT/OT recommending SNF placement    Discussed with patient and daughter at the bedside explained about my above plan care.  Discussed with the patient about discharge planning process, patient prefers going to SNF now.     Discussed with case management, insurance authorization pending    Disposition: SNF pending insurance authorization, patient medically stable for discharge    Dragon medical dictation software was used for portions of this report. Unintended errors may occur.      Scotty Ramires MD

## 2024-07-18 NOTE — CARE COORDINATION
SNF Authorization  7/18/2024, 1:53 PM    Patient Name: Meggan Rossi                   YOB: 1950    Received via [email or web address]  Auth ID:  [id number]  Auth Reference: [id number]  Service:  [facility]  Approval Dates:  7/18/24-7/22/24  Next Review Date:  7/22/24    Shweta Banks  Case Management Department  Ph: [phone]

## 2024-07-18 NOTE — PROGRESS NOTES
[facility]  (A Division of [facility])      Consultation Note      Date of Admission: 7/10/2024    Date of Note: 7/18/2024      Reason for Referral: Bilateral lower extremity heel wounds  Referring Physician: Dr. Cody Barrera from this admission:   None    Current Antimicrobials:    Prior Antimicrobials:  None Azithromycin 7/90 x 1 dose       Assessment:         Osteomyelitis and diabetic foot ulcer of the left calcaneus: Status post I&D with Calc ectomy on 6/4.  Surgical cultures positive for Enterococcus faecalis.  Biopsy consistent with osteomyelitis.  Completed therapy on 7/5 with IV vancomycin..   -Overall wound to the left heel as well as the right seem to be clean although there is some drainage at the base of the left heel.  Acute on chronic HFmrEF: Initial BNP 1330.  Nonischemic dilated cardiomyopathy  ALEXA with CKD stage IV  Diabetes mellitus type 2  Chronic nonocclusive left femoral DVT on Eliquis  Plan:   Continue with local wound care.  Wound care team is following.   -Discussed with nursing to ensure that dressings are changed every 3 days.  Last dressing change was on 7/17    Her podiatrist is contacting Dr. Elena to request that a graft be placed on her left heel.    No additional Abx needed.      Kayla Montenegro DO  [facility]  [address], [address], [place] [address]  Office: [phone], Ext [phone]      Lines / Catheters:  Peripheral      Subjective:   Seen and examined.  Having some pain from around her new battery site.  She says she just got off the phone with her podiatrist.  He would like her to have a skin graft placed before she leaves.  Overall she continues to feel well.  No particular complaints.  No shortness of breath.    Tmax 98.3  WBC none today    HPI:  Ms. Rossi is a very pleasant 73-year-old female with a past medical history of anemia of chronic disease, chronic systolic heart failure,

## 2024-07-18 NOTE — CARE COORDINATION
Auth for SNF is currently still in pending status. Will update with recent clinical notes and will continue to follow up.

## 2024-07-18 NOTE — PROGRESS NOTES
[facility] - Progress Note    Admit Date: 7/10/2024  Attending Cardiologist: Dr. Ross    Assessment:     Patient Active Problem List    Diagnosis Date Noted    Pacemaker generator end of life 07/17/2024    Edema of left lower extremity 07/15/2024    Hypoxia 07/15/2024    CKD (chronic kidney disease), stage IV ([facility]) 07/11/2024    Anemia of chronic disease 07/11/2024    Acute respiratory failure with hypoxia ([facility]) 07/11/2024    Acute on chronic congestive heart failure ([facility]) 07/10/2024    Pressure injury of skin of left heel 06/29/2023    Proteinuria 03/29/2021    Wheelchair dependence 03/05/2020    Neuropathic ulcer of heel, right, with fat layer exposed ([facility]) 03/05/2020    Systolic CHF, acute on chronic ([facility]) 12/13/2019    Elevated troponin 12/12/2019    SOB (shortness of breath) 12/12/2019    Urinary retention 09/17/2019    Diabetic polyneuropathy associated with type 2 diabetes mellitus ([facility]) 03/26/2019    Non-healing wound of right heel 03/26/2019    Infected wound 12/27/2018    Heel ulcer ([facility]) 10/17/2018    Osteomyelitis ([facility]) 08/16/2018    Foot osteomyelitis, right ([facility]) 08/16/2018    Class 3 severe obesity with body mass index (BMI) of 40.0 to 44.9 in adult ([facility]) 05/24/2018    Obesity (BMI 30.0-34.9) 05/04/2018    Type 2 diabetes mellitus with diabetic polyneuropathy, with long-term current use of insulin ([facility]) 03/12/2018    Neurogenic bladder 10/12/2017    Pacemaker 09/13/2017    Hypervolemia 06/06/2017    Psoas abscess, right ([facility]) 04/20/2017    Urinary tract infection due to Enterococcus 04/20/2017    Sepsis ([facility]) 04/20/2017    Rivera catheter in place on admission 04/20/2017    Group B streptococcal infection 04/20/2017    Acute paraplegia ([facility]) 04/20/2017    History of Coumadin therapy     Gout     Heart failure with mildly reduced ejection fraction (HFmrEF) ([facility])     Psoas hematoma, right, secondary to anticoagulant therapy 03/30/2017    Impaired mobility and ADLs 03/30/2017    Iliopsoas  potassium chloride (KLOR-CON M) extended release tablet 40 mEq  40 mEq Oral Daily    guaiFENesin (MUCINEX) extended release tablet 600 mg  600 mg Oral BID    insulin lispro (HUMALOG,ADMELOG) injection vial 10 Units  10 Units SubCUTAneous TID AC    insulin glargine (LANTUS) injection vial 30 Units  30 Units SubCUTAneous Nightly    acetaminophen (TYLENOL) tablet 500 mg  500 mg Oral Q4H PRN    cyclobenzaprine (FLEXERIL) tablet 5 mg  5 mg Oral TID PRN    camphor-menthol-methyl salicylate (BENGAY ULTRA STRENGTH) 4-10-30 % cream   Apply externally TID PRN    [Held by provider] apixaban (ELIQUIS) tablet 5 mg  5 mg Oral BID    carvedilol (COREG) tablet 6.25 mg  6.25 mg Oral BID WC    hydrOXYzine HCl (ATARAX) tablet 25 mg  25 mg Oral Q6H PRN    methenamine (HIPREX) tablet 1 g (Patient Supplied)  1 g Oral BID WC    multivitamin 1 tablet  1 tablet Oral Daily    sodium chloride flush 0.9 % injection 5-40 mL  5-40 mL IntraVENous 2 times per day    sodium chloride flush 0.9 % injection 5-40 mL  5-40 mL IntraVENous PRN    0.9 % sodium chloride infusion   IntraVENous PRN    ondansetron (ZOFRAN-ODT) disintegrating tablet 4 mg  4 mg Oral Q8H PRN    Or    ondansetron (ZOFRAN) injection 4 mg  4 mg IntraVENous Q6H PRN    polyethylene glycol (GLYCOLAX) packet 17 g  17 g Oral Daily PRN    glucose chewable tablet 16 g  4 tablet Oral PRN    dextrose bolus 10% 125 mL  125 mL IntraVENous PRN    Or    dextrose bolus 10% 250 mL  250 mL IntraVENous PRN    glucagon (rDNA) injection 1 mg  1 mg SubCUTAneous PRN    dextrose 10 % infusion   IntraVENous Continuous PRN    insulin lispro (HUMALOG,ADMELOG) injection vial 0-8 Units  0-8 Units SubCUTAneous TID WC    insulin lispro (HUMALOG,ADMELOG) injection vial 0-4 Units  0-4 Units SubCUTAneous Nightly    atorvastatin (LIPITOR) tablet 80 mg  80 mg Oral Daily         Intake/Output Summary (Last 24 hours) at 7/18/2024 1354  Last data filed at 7/18/2024 0815  Gross per 24 hour   Intake 480 ml   Output --

## 2024-07-18 NOTE — PROGRESS NOTES
RENAL DAILY PROGRESS NOTE              Subjective:       Complaint:   Overnight events noted  no nausea, vomiting, chest pain  Breathing  is ok    IMPRESSION:   ALEXA due to cardiorenal syndrome  CKD stage 4 due to diabetes,hypertension  Diabetes  Hx diabetic foot,osteomyelitis,heel ulcers s/p calcanectomy 6/4/24.  Controlled hypertension  NICMO EF 35-40  Hx spinal cord injury with chronic bladder retention, s/p supra pubic catheter  Hx diabetic neuropathy   PLAN:   Resume bumex 2 mg po bid. She can be discharged on this dose of bumex.  Daily k repletion  Wouldn't suggest entresto or metalozone on discharge  I and O  Daily labs  Daily weights           Current Facility-Administered Medications   Medication Dose Route Frequency    bumetanide (BUMEX) tablet 2 mg  2 mg Oral BID    docusate sodium (COLACE) capsule 100 mg  100 mg Oral BID    psyllium husk-aspartame (METAMUCIL FIBER) packet 1 packet  1 packet Oral BID    phenylephrine-mineral oil-petrolatum (PREPARATION H) rectal ointment   Rectal BID PRN    potassium chloride (KLOR-CON M) extended release tablet 40 mEq  40 mEq Oral Daily    guaiFENesin (MUCINEX) extended release tablet 600 mg  600 mg Oral BID    insulin lispro (HUMALOG,ADMELOG) injection vial 10 Units  10 Units SubCUTAneous TID AC    insulin glargine (LANTUS) injection vial 30 Units  30 Units SubCUTAneous Nightly    acetaminophen (TYLENOL) tablet 500 mg  500 mg Oral Q4H PRN    cyclobenzaprine (FLEXERIL) tablet 5 mg  5 mg Oral TID PRN    camphor-menthol-methyl salicylate (BENGAY ULTRA STRENGTH) 4-10-30 % cream   Apply externally TID PRN    [Held by provider] apixaban (ELIQUIS) tablet 5 mg  5 mg Oral BID    carvedilol (COREG) tablet 6.25 mg  6.25 mg Oral BID WC    hydrOXYzine HCl (ATARAX) tablet 25 mg  25 mg Oral Q6H PRN    methenamine (HIPREX) tablet 1 g (Patient Supplied)  1 g Oral BID WC    multivitamin 1 tablet  1 tablet Oral Daily    sodium chloride flush 0.9 % injection 5-40 mL  5-40 mL

## 2024-07-19 VITALS
HEIGHT: 67 IN | BODY MASS INDEX: 39.1 KG/M2 | SYSTOLIC BLOOD PRESSURE: 149 MMHG | OXYGEN SATURATION: 100 % | TEMPERATURE: 98 F | RESPIRATION RATE: 17 BRPM | WEIGHT: 249.12 LBS | HEART RATE: 60 BPM | DIASTOLIC BLOOD PRESSURE: 76 MMHG

## 2024-07-19 PROBLEM — R09.02 HYPOXIA: Status: RESOLVED | Noted: 2024-07-15 | Resolved: 2024-07-19

## 2024-07-19 PROBLEM — Z45.010 PACEMAKER GENERATOR END OF LIFE: Status: RESOLVED | Noted: 2024-07-17 | Resolved: 2024-07-19

## 2024-07-19 PROBLEM — R60.0 EDEMA OF LEFT LOWER EXTREMITY: Status: RESOLVED | Noted: 2024-07-15 | Resolved: 2024-07-19

## 2024-07-19 PROBLEM — R06.02 SOB (SHORTNESS OF BREATH): Status: RESOLVED | Noted: 2019-12-12 | Resolved: 2024-07-19

## 2024-07-19 PROBLEM — J96.01 ACUTE RESPIRATORY FAILURE WITH HYPOXIA (HCC): Status: RESOLVED | Noted: 2024-07-11 | Resolved: 2024-07-19

## 2024-07-19 LAB
ALBUMIN SERPL-MCNC: 2.7 G/DL (ref 3.4–5)
ANION GAP SERPL CALC-SCNC: 7 MMOL/L (ref 3–18)
BUN SERPL-MCNC: 76 MG/DL (ref 7–18)
BUN/CREAT SERPL: 32 (ref 12–20)
CALCIUM SERPL-MCNC: 9.5 MG/DL (ref 8.5–10.1)
CHLORIDE SERPL-SCNC: 104 MMOL/L (ref 100–111)
CO2 SERPL-SCNC: 27 MMOL/L (ref 21–32)
CREAT SERPL-MCNC: 2.34 MG/DL (ref 0.6–1.3)
GLUCOSE BLD STRIP.AUTO-MCNC: 145 MG/DL (ref 70–110)
GLUCOSE BLD STRIP.AUTO-MCNC: 181 MG/DL (ref 70–110)
GLUCOSE SERPL-MCNC: 173 MG/DL (ref 74–99)
PHOSPHATE SERPL-MCNC: 4.2 MG/DL (ref 2.5–4.9)
POTASSIUM SERPL-SCNC: 3.6 MMOL/L (ref 3.5–5.5)
SODIUM SERPL-SCNC: 138 MMOL/L (ref 136–145)

## 2024-07-19 PROCEDURE — 99239 HOSP IP/OBS DSCHRG MGMT >30: CPT | Performed by: HOSPITALIST

## 2024-07-19 PROCEDURE — 6370000000 HC RX 637 (ALT 250 FOR IP): Performed by: HOSPITALIST

## 2024-07-19 PROCEDURE — 82962 GLUCOSE BLOOD TEST: CPT

## 2024-07-19 PROCEDURE — 6370000000 HC RX 637 (ALT 250 FOR IP): Performed by: INTERNAL MEDICINE

## 2024-07-19 PROCEDURE — 36415 COLL VENOUS BLD VENIPUNCTURE: CPT

## 2024-07-19 PROCEDURE — 2580000003 HC RX 258: Performed by: HOSPITALIST

## 2024-07-19 PROCEDURE — 80069 RENAL FUNCTION PANEL: CPT

## 2024-07-19 PROCEDURE — 6370000000 HC RX 637 (ALT 250 FOR IP): Performed by: STUDENT IN AN ORGANIZED HEALTH CARE EDUCATION/TRAINING PROGRAM

## 2024-07-19 RX ORDER — POTASSIUM CHLORIDE 20 MEQ/1
40 TABLET, EXTENDED RELEASE ORAL DAILY
Qty: 60 TABLET | Refills: 3 | Status: SHIPPED | DISCHARGE
Start: 2024-07-20

## 2024-07-19 RX ORDER — MULTIVITAMIN WITH IRON
1 TABLET ORAL DAILY
Refills: 0 | Status: SHIPPED | DISCHARGE
Start: 2024-07-20

## 2024-07-19 RX ORDER — INSULIN GLARGINE 100 [IU]/ML
30 INJECTION, SOLUTION SUBCUTANEOUS NIGHTLY
Qty: 10 ML | Refills: 3 | Status: SHIPPED | DISCHARGE
Start: 2024-07-19

## 2024-07-19 RX ORDER — POLYETHYLENE GLYCOL 3350 17 G/17G
17 POWDER, FOR SOLUTION ORAL DAILY PRN
Qty: 527 G | Refills: 1 | Status: SHIPPED | DISCHARGE
Start: 2024-07-19 | End: 2024-08-18

## 2024-07-19 RX ORDER — PSEUDOEPHEDRINE HCL 30 MG
100 TABLET ORAL 2 TIMES DAILY
Status: SHIPPED | DISCHARGE
Start: 2024-07-19

## 2024-07-19 RX ORDER — BUMETANIDE 2 MG/1
2 TABLET ORAL 2 TIMES DAILY
Qty: 30 TABLET | Refills: 3 | Status: SHIPPED | DISCHARGE
Start: 2024-07-19

## 2024-07-19 RX ORDER — DIPHENHYDRAMINE HCL 25 MG
25 CAPSULE ORAL EVERY 6 HOURS PRN
Status: SHIPPED | DISCHARGE
Start: 2024-07-19 | End: 2024-07-29

## 2024-07-19 RX ORDER — CYCLOBENZAPRINE HCL 5 MG
5 TABLET ORAL 3 TIMES DAILY PRN
Status: SHIPPED | DISCHARGE
Start: 2024-07-19 | End: 2024-07-29

## 2024-07-19 RX ADMIN — THERA TABS 1 TABLET: TAB at 08:22

## 2024-07-19 RX ADMIN — DOCUSATE SODIUM 100 MG: 100 CAPSULE, LIQUID FILLED ORAL at 08:23

## 2024-07-19 RX ADMIN — ATORVASTATIN CALCIUM 80 MG: 40 TABLET, FILM COATED ORAL at 08:21

## 2024-07-19 RX ADMIN — CARVEDILOL 6.25 MG: 6.25 TABLET, FILM COATED ORAL at 08:22

## 2024-07-19 RX ADMIN — SODIUM CHLORIDE, PRESERVATIVE FREE 10 ML: 5 INJECTION INTRAVENOUS at 08:27

## 2024-07-19 RX ADMIN — INSULIN LISPRO 10 UNITS: 100 INJECTION, SOLUTION INTRAVENOUS; SUBCUTANEOUS at 11:53

## 2024-07-19 RX ADMIN — GUAIFENESIN 600 MG: 600 TABLET, EXTENDED RELEASE ORAL at 08:23

## 2024-07-19 RX ADMIN — APIXABAN 5 MG: 5 TABLET, FILM COATED ORAL at 08:24

## 2024-07-19 RX ADMIN — PSYLLIUM HUSK 1 PACKET: 3.4 POWDER ORAL at 08:30

## 2024-07-19 RX ADMIN — INSULIN LISPRO 10 UNITS: 100 INJECTION, SOLUTION INTRAVENOUS; SUBCUTANEOUS at 08:25

## 2024-07-19 RX ADMIN — BUMETANIDE 2 MG: 1 TABLET ORAL at 08:22

## 2024-07-19 RX ADMIN — POTASSIUM CHLORIDE 40 MEQ: 1500 TABLET, EXTENDED RELEASE ORAL at 08:22

## 2024-07-19 RX ADMIN — METHENAMINE HIPPURATE 1 G: 1000 TABLET ORAL at 08:35

## 2024-07-19 ASSESSMENT — PAIN SCALES - GENERAL
PAINLEVEL_OUTOF10: 0

## 2024-07-19 NOTE — PLAN OF CARE
Problem: Pain  Goal: Verbalizes/displays adequate comfort level or baseline comfort level  7/19/2024 1549 by Braeden Ramires RN  Outcome: Adequate for Discharge  Flowsheets (Taken 7/19/2024 1119)  Verbalizes/displays adequate comfort level or baseline comfort level: Encourage patient to monitor pain and request assistance  7/19/2024 1118 by Braeden Ramires RN  Outcome: Progressing  7/19/2024 0203 by Jose Roberts RN  Outcome: Progressing     Problem: Skin/Tissue Integrity  Goal: Absence of new skin breakdown  Description: 1.  Monitor for areas of redness and/or skin breakdown  2.  Assess vascular access sites hourly  3.  Every 4-6 hours minimum:  Change oxygen saturation probe site  4.  Every 4-6 hours:  If on nasal continuous positive airway pressure, respiratory therapy assess nares and determine need for appliance change or resting period.  7/19/2024 1549 by Braeden Ramires RN  Outcome: Adequate for Discharge  7/19/2024 1118 by Braeden Ramires RN  Outcome: Progressing  7/19/2024 0203 by Jose Roberts RN  Outcome: Progressing     Problem: ABCDS Injury Assessment  Goal: Absence of physical injury  7/19/2024 1549 by Braeden Ramires RN  Outcome: Adequate for Discharge  7/19/2024 1118 by Braeden Ramires RN  Outcome: Progressing  Flowsheets (Taken 7/19/2024 1009)  Absence of Physical Injury: Implement safety measures based on patient assessment  7/19/2024 0203 by Jose Roberts RN  Outcome: Progressing     Problem: Safety - Adult  Goal: Free from fall injury  7/19/2024 1549 by Braeden Ramires RN  Outcome: Adequate for Discharge  7/19/2024 1118 by Braeden Ramires RN  Outcome: Progressing  Flowsheets (Taken 7/19/2024 1009)  Free From Fall Injury: Instruct family/caregiver on patient safety  7/19/2024 0203 by Jose Roberts RN  Outcome: Progressing     Problem: Chronic Conditions and Co-morbidities  Goal: Patient's chronic conditions and co-morbidity symptoms are monitored and

## 2024-07-19 NOTE — CARE COORDINATION
Requested Case Management specialist to assist with transportation to [facility].  Address is [address] [place] [address] and phone number is [phone]  Patient will require BLS transport.   Pt requires Stretcher If stretcher, reason: Patient has history of stroke, has CHF, and acute paraplegia.   Patient is currently requiring oxygen: No   Height: 5' 7\"   Weight: 249lbs  Pt is on isolation: No   Is the pt ready now? No  Requested time: 2:00 pm on 7/19/2024  PCS Faxed: No  Insurance verified on face sheet: Yes  Auth needed for transport: Yes  CM completed PCS/ Envelope and placed on chart.     DEANNA KnightN, RN   Case Management   [phone]

## 2024-07-19 NOTE — CARE COORDINATION
Call made to [facility] transportation [phone], spoke with Rad, patient scheduled for 6:00 pm .

## 2024-07-19 NOTE — CARE COORDINATION
Patient's  time changed from 6:00 pm to 2:30 pm, today, from [facility] transportation [phone].

## 2024-07-19 NOTE — PROGRESS NOTES
[facility]  (A Division of [facility])      Consultation Note      Date of Admission: 7/10/2024    Date of Note: 7/19/2024      Reason for Referral: Bilateral lower extremity heel wounds  Referring Physician: Dr. Cody Barrera from this admission:   None    Current Antimicrobials:    Prior Antimicrobials:  None Azithromycin 7/90 x 1 dose       Assessment:         Osteomyelitis and diabetic foot ulcer of the left calcaneus: Status post I&D with Calc ectomy on 6/4.  Surgical cultures positive for Enterococcus faecalis.  Biopsy consistent with osteomyelitis.  Completed therapy on 7/5 with IV vancomycin..   -Overall wound to the left heel as well as the right seem to be clean although there is some drainage at the base of the left heel.  Acute on chronic HFmrEF: Initial BNP 1330.  Nonischemic dilated cardiomyopathy  ALEXA with CKD stage IV  Diabetes mellitus type 2  Chronic nonocclusive left femoral DVT on Eliquis  Plan:   Continue with local wound care.  Wound care team is following.   - ensure that dressings are changed every 3 days.  Last dressing change was on 7/17    No additional Abx needed.      Kayla Montenegro DO  [facility]  [address], [address], [place] [address]  Office: [phone], Ext [phone]      Lines / Catheters:  Peripheral      Subjective:   Seen and examined. Overall she continues to feel well. Some pain around the PPM site. Disappointed that podiatry had not come by.   No shortness of breath.    Tmax 98.3  WBC none today    HPI:  Ms. Rossi is a very pleasant 73-year-old female with a past medical history of anemia of chronic disease, chronic systolic heart failure, diabetes mellitus type 2 with neuropathy chronic kidney disease, prior DVT, dyslipidemia, and AICD who was recently admitted to [facility] in early June secondary to left heel osteomyelitis.  She has a history of diabetic neuropathy with  mass appropriate for age   Neurologic:  No gross focal sensory abnormalities; equal muscle strength to upper and lower extremities. Speech appropirate. Cranial nerves intact   Psychiatric:   appropriate and interactive.       Labs: Results:   Chemistry Recent Labs     07/17/24  1440 07/18/24  0351 07/19/24  0117    137 138   K 3.3* 3.7 3.6    103 104   CO2 29 27 27   BUN 72* 78* 76*      CBC w/Diff No results for input(s): \"WBC\", \"RBC\", \"HGB\", \"HCT\", \"PLT\" in the last 72 hours.    Invalid input(s): \"GRANS\", \"LYMPH\", \"EOS\"           Lab Results   Component Value Date/Time    SDES URINE 07/24/2012 01:29 AM    No components found for: \"CULT\"     Results       ** No results found for the last 336 hours. **                  Imaging:     All available imaging since presentation reviewed as per EPIC

## 2024-07-19 NOTE — PROGRESS NOTES
Attempted to see patient for PT treatment however she declined stating that she is having a procedure later today so would like to wait.  Deisy Giraldo, PT

## 2024-07-19 NOTE — CARE COORDINATION
Transition of Care Plan to SNF/Rehab      Patient Name: Meggan Rossi                   YOB: 1950    SNF/Rehab Transition:  Patient has been accepted to [facility] SNF/Rehab and meets criteria for admission. Confirmed with Rowan that bed is available for today.   Patient will transported by [facility] and expected to leave at 6:00pm.  Met with patient and daughter and they are agreeable to the transition plan.    Medicaid Long-term Services and Supports(LTSS) screening completion: No    Three Inpatient Midnights for Medicare: Yes    Current Code Status:   Code Status: Full Code     Last Weight:   Wt Readings from Last 1 Encounters:   07/16/24 113 kg (249 lb 1.9 oz)       Weightbearing Status: No    IV Medication, IV Site, Device Type: No    Dialysis: No      O2 Needs (including O2, Bipap, Cpap, ect.): No    Covid Vaccine Dates/ if known:    Internal Administration   First Dose COVID-19, PFIZER PURPLE top, DILUTE for use, (age 12 y+), 30mcg/0.3mL  04/29/2021   Second Dose COVID-19, PFIZER PURPLE top, DILUTE for use, (age 12 y+), 30mcg/0.3mL   05/22/2021       Last COVID Lab No results found for: \"SARS-COV-2\"         Last COVID Lab No results found for: \"SARS-COV-2\", \"SARS-COV-2 RNA\", \"SARS-COV-2 RNA, RT PCR\", \"SARS-COV-2, RANDA\", \"SARS-COV-2, NAAT\", \"SARS-COV-2 BY PCR\", \"RAPID\", \"SARS-COV-2, RAPID\", \"SALIVA\", \"SARS-COV-2, SALIVA\"           Current Diet: ADULT DIET; Regular; Low Fat/Low Chol/High Fiber/2 gm Na; 1500 ml    Wound Vac or Other Equipment Needs:     Patient requires Isolation: No    Follow-up Appointment needed or scheduled:     Communication to SNF/Rehab:  Bedside [facility], has been notified to update the transition plan to the facility and call report (phone number).  Discharge information has been updated on the AVS and communicated to facility via CarePort.    Nursing Please include all hard scripts for controlled substances, med rec and dc summary, and AVS in

## 2024-07-19 NOTE — PROGRESS NOTES
RENAL DAILY PROGRESS NOTE            73y F with PMH DM, CHF, CKD, anemia, admitted for decompensated heart failure, following for renal failure   Subjective:       Complaint:   Overnight events noted  Her diuretics has been resume yesterday   Documented urine output about 2.4L  Denies for any chest pain , short of breath     IMPRESSION:   ALEXA due to cardiorenal syndrome  CKD stage 4 due to diabetes,hypertension  Diabetes  Hx diabetic foot,osteomyelitis,heel ulcers s/p calcanectomy 6/4/24.  Controlled hypertension  NICMO EF 35-40  Hx spinal cord injury with chronic bladder retention, s/p supra pubic catheter  Hx diabetic neuropathy   PLAN:   From renal stand point, she is not far from her baseline renal function, continue current diuretics.   Adjust meds per renal function            Current Facility-Administered Medications   Medication Dose Route Frequency    bumetanide (BUMEX) tablet 2 mg  2 mg Oral BID    apixaban (ELIQUIS) tablet 5 mg  5 mg Oral BID    diphenhydrAMINE (BENADRYL) capsule 25 mg  25 mg Oral Q6H PRN    docusate sodium (COLACE) capsule 100 mg  100 mg Oral BID    psyllium husk-aspartame (METAMUCIL FIBER) packet 1 packet  1 packet Oral BID    phenylephrine-mineral oil-petrolatum (PREPARATION H) rectal ointment   Rectal BID PRN    potassium chloride (KLOR-CON M) extended release tablet 40 mEq  40 mEq Oral Daily    guaiFENesin (MUCINEX) extended release tablet 600 mg  600 mg Oral BID    insulin lispro (HUMALOG,ADMELOG) injection vial 10 Units  10 Units SubCUTAneous TID AC    insulin glargine (LANTUS) injection vial 30 Units  30 Units SubCUTAneous Nightly    acetaminophen (TYLENOL) tablet 500 mg  500 mg Oral Q4H PRN    cyclobenzaprine (FLEXERIL) tablet 5 mg  5 mg Oral TID PRN    camphor-menthol-methyl salicylate (BENGAY ULTRA STRENGTH) 4-10-30 % cream   Apply externally TID PRN    carvedilol (COREG) tablet 6.25 mg  6.25 mg Oral BID WC    hydrOXYzine HCl (ATARAX) tablet 25 mg  25 mg Oral Q6H

## 2024-07-19 NOTE — DISCHARGE SUMMARY
camphor-menthol-methyl salicylate (BENGAY ULTRA STRENGTH) 4-10-30 % CREA cream Apply topically 3 times daily as needed for Pain      docusate sodium (COLACE, DULCOLAX) 100 MG CAPS Take 100 mg by mouth 2 times daily      psyllium husk-aspartame (METAMUCIL FIBER) 51.7 % PACK packet Take 1 packet by mouth in the morning and at bedtime      polyethylene glycol (GLYCOLAX) 17 g packet Take 1 packet by mouth daily as needed for Constipation  Qty: 527 g, Refills: 1      potassium chloride (KLOR-CON M) 20 MEQ extended release tablet Take 2 tablets by mouth daily  Qty: 60 tablet, Refills: 3                Details   bumetanide (BUMEX) 2 MG tablet Take 1 tablet by mouth in the morning and 1 tablet in the evening.  Qty: 30 tablet, Refills: 3      Multiple Vitamin (MULTIVITAMIN) TABS tablet Take 1 tablet by mouth daily  Refills: 0      cyclobenzaprine (FLEXERIL) 5 MG tablet Take 1 tablet by mouth 3 times daily as needed for Muscle spasms                Details   atorvastatin (LIPITOR) 80 MG tablet Take 1 tablet by mouth daily      collagenase 250 UNIT/GM ointment Apply 1 g topically daily Apply topically daily.      Insulin Pen Needle (UNIFINE PENTIPS) 31G X 5 MM MISC 1 each by Does not apply route 4 times daily      apixaban (ELIQUIS) 5 MG TABS tablet Take 1 tablet by mouth 2 times daily      carvedilol (COREG) 6.25 MG tablet TAKE 1 TABLET BY MOUTH TWICE A DAY  Qty: 180 tablet, Refills: 3      folic acid (FOLVITE) 1 MG tablet Take 1 tablet by mouth daily      HUMALOG KWIKPEN 100 UNIT/ML SOPN Inject 6 Units into the skin 3 times daily (before meals)      Albuterol Sulfate, sensor, 108 (90 Base) MCG/ACT AEPB Inhale into the lungs as needed      nystatin (NYAMYC) 850522 UNIT/GM powder Apply topically 2 times daily Apply topically 4 times daily.      ketoconazole (NIZORAL) 2 % shampoo Apply topically daily as needed for Itching Apply topically daily as needed.      BYDUREON BCISE 2 MG/0.85ML injection INJECT 1 (ONE) INJECTION  UNDER THE SKIN WEEKLY      hydrOXYzine HCl (ATARAX) 25 MG tablet Take 1 tablet by mouth 2 times daily as needed for Itching                 DIET:  cardiac diet and diabetic diet    ACTIVITY: activity as tolerated  Patient needs to be on Fall, aspiration, decubitus precaution.     PT/OT consult   Wound care consult   Accuchecks  [facility]      ADDITIONAL INFORMATION: If you experience any of the following symptoms but not limited to Fever, chills, nausea, vomiting, diarrhea, change in mentation, falling, bleeding, shortness of breath, chest pain, please call your primary care physician or return to the emergency room if you cannot get hold of your doctor:     FOLLOW UP CARE:  Follow-up with 1. Physician at SNF in 1-2 days with Cbc with diff, bmp, mg.                             2. Dr. Ross, cardiology in 2 week                           3.  Podiatry 2 week    Pt's PCP: Darci Garcia MD.    Minutes spent on discharge: >40 minutes spent coordinating this discharge (review instructions/follow-up, prescriptions, preparing report for sign off)    Scotty Ramires MD  7/19/2024 2:14 PM    Disclaimer: Sections of this note are dictated using utilizing voice recognition software.  Minor typographical errors may be present. If questions arise, please do not hesitate to contact me or call our department.

## 2024-07-19 NOTE — PLAN OF CARE
Problem: Pain  Goal: Verbalizes/displays adequate comfort level or baseline comfort level  7/19/2024 1118 by Braeden Ramires RN  Outcome: Progressing  7/19/2024 0203 by Jose Roberts RN  Outcome: Progressing     Problem: Skin/Tissue Integrity  Goal: Absence of new skin breakdown  Description: 1.  Monitor for areas of redness and/or skin breakdown  2.  Assess vascular access sites hourly  3.  Every 4-6 hours minimum:  Change oxygen saturation probe site  4.  Every 4-6 hours:  If on nasal continuous positive airway pressure, respiratory therapy assess nares and determine need for appliance change or resting period.  7/19/2024 1118 by Braeden Ramires RN  Outcome: Progressing  7/19/2024 0203 by Jose Roberts RN  Outcome: Progressing     Problem: ABCDS Injury Assessment  Goal: Absence of physical injury  7/19/2024 1118 by Braeden Ramires RN  Outcome: Progressing  Flowsheets (Taken 7/19/2024 1009)  Absence of Physical Injury: Implement safety measures based on patient assessment  7/19/2024 0203 by Jose Roberts RN  Outcome: Progressing     Problem: Safety - Adult  Goal: Free from fall injury  7/19/2024 1118 by Braeden Ramires RN  Outcome: Progressing  Flowsheets (Taken 7/19/2024 1009)  Free From Fall Injury: Instruct family/caregiver on patient safety  7/19/2024 0203 by Jose Roberts RN  Outcome: Progressing     Problem: Chronic Conditions and Co-morbidities  Goal: Patient's chronic conditions and co-morbidity symptoms are monitored and maintained or improved  7/19/2024 1118 by Braeden Ramires RN  Outcome: Progressing  Flowsheets (Taken 7/19/2024 1009)  Care Plan - Patient's Chronic Conditions and Co-Morbidity Symptoms are Monitored and Maintained or Improved: Monitor and assess patient's chronic conditions and comorbid symptoms for stability, deterioration, or improvement  7/19/2024 0203 by Jose Roberts RN  Outcome: Progressing      PRINCIPAL DISCHARGE DIAGNOSIS  Diagnosis: CAD (coronary artery disease)  Assessment and Plan of Treatment:       SECONDARY DISCHARGE DIAGNOSES  Diagnosis: CAD (coronary artery disease)  Assessment and Plan of Treatment:

## 2024-07-19 NOTE — CARE COORDINATION
JEY called [facility] admission director at [phone] and left voicemail stating authorization is approved and to call CM back.    Genevieve EDOUARD, RN   Case Management   [phone]

## 2024-07-19 NOTE — PROGRESS NOTES
[facility] - Progress Note    Admit Date: 7/10/2024  Attending Cardiologist: Dr. Ross    Assessment:     Patient Active Problem List    Diagnosis Date Noted    Pacemaker generator end of life 07/17/2024    Edema of left lower extremity 07/15/2024    Hypoxia 07/15/2024    CKD (chronic kidney disease), stage IV ([facility]) 07/11/2024    Anemia of chronic disease 07/11/2024    Acute respiratory failure with hypoxia ([facility]) 07/11/2024    Acute on chronic congestive heart failure ([facility]) 07/10/2024    Pressure injury of skin of left heel 06/29/2023    Proteinuria 03/29/2021    Wheelchair dependence 03/05/2020    Neuropathic ulcer of heel, right, with fat layer exposed ([facility]) 03/05/2020    Systolic CHF, acute on chronic ([facility]) 12/13/2019    Elevated troponin 12/12/2019    SOB (shortness of breath) 12/12/2019    Urinary retention 09/17/2019    Diabetic polyneuropathy associated with type 2 diabetes mellitus ([facility]) 03/26/2019    Non-healing wound of right heel 03/26/2019    Infected wound 12/27/2018    Heel ulcer ([facility]) 10/17/2018    Osteomyelitis ([facility]) 08/16/2018    Foot osteomyelitis, right ([facility]) 08/16/2018    Class 3 severe obesity with body mass index (BMI) of 40.0 to 44.9 in adult ([facility]) 05/24/2018    Obesity (BMI 30.0-34.9) 05/04/2018    Type 2 diabetes mellitus with diabetic polyneuropathy, with long-term current use of insulin ([facility]) 03/12/2018    Neurogenic bladder 10/12/2017    Pacemaker 09/13/2017    Hypervolemia 06/06/2017    Psoas abscess, right ([facility]) 04/20/2017    Urinary tract infection due to Enterococcus 04/20/2017    Sepsis ([facility]) 04/20/2017    Rivera catheter in place on admission 04/20/2017    Group B streptococcal infection 04/20/2017    Acute paraplegia ([facility]) 04/20/2017    History of Coumadin therapy     Gout     Heart 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 Aquacel dressing C/D/I  Extremities:  extremities normal, atraumatic, no cyanosis or edema; wounds to bilateral heals wrapped in ace bandage    Visit Vitals  BP (!) 149/76   Pulse 63   Temp 98 °F (36.7 °C) (Oral)   Resp 17   Ht 1.702 m (5' 7\")   Wt 113 kg (249 lb 1.9 oz)   SpO2 100%   BMI 39.02 kg/m²       Data Review:     Labs: Results:       Chemistry Recent Labs     07/17/24  1440 07/18/24  0351 07/19/24  0117    137 138   K 3.3* 3.7 3.6    103 104   CO2 29 27 27   BUN 72* 78* 76*   PHOS 4.6 4.7 4.2      CBC w/Diff No results for input(s): \"WBC\", \"RBC\", \"HGB\", \"HCT\", \"PLT\" in the last 72 hours.    Invalid input(s): \"GRANS\", \"LYMPH\", \"EOS\"   Cardiac Enzymes Lab Results   Component Value Date/Time    TROPHS 18 07/10/2024 07:55 AM      Coagulation No results for input(s): \"INR\", \"APTT\" in the last 72 hours.    Lipid Panel No results found for: \"CHOL\", \"CHOLPOCT\", \"CHLST\", \"CHOLV\", \"418640\", \"HDL\", \"HDLC\", \"LDL\", \"VLDLC\", \"VLDL\"   BNP Lab Results   Component Value Date/Time    .7 03/22/2021 12:30 PM     11/21/2020 02:30 PM     08/03/2020 04:49 PM    BNP 1,078 12/15/2019 03:11 AM    BNP 3,357 12/12/2019 04:02 PM       Lab Results   Component Value Date    .7 (H) 03/22/2021      Liver Enzymes Lab Results   Component Value Date    ALT 28 07/12/2024    AST 20 07/12/2024    ALKPHOS 109 07/12/2024    BILITOT 0.6 07/12/2024      Thyroid Studies Lab Results   Component Value Date/Time    TSH 0.29 12/14/2019 12:55 PM          Signed By: SHYAM Hurd - RADHA     July 19, 2024

## 2024-07-19 NOTE — PLAN OF CARE
Problem: Pain  Goal: Verbalizes/displays adequate comfort level or baseline comfort level  Outcome: Progressing     Problem: Skin/Tissue Integrity  Goal: Absence of new skin breakdown  Description: 1.  Monitor for areas of redness and/or skin breakdown  2.  Assess vascular access sites hourly  3.  Every 4-6 hours minimum:  Change oxygen saturation probe site  4.  Every 4-6 hours:  If on nasal continuous positive airway pressure, respiratory therapy assess nares and determine need for appliance change or resting period.  Outcome: Progressing     Problem: ABCDS Injury Assessment  Goal: Absence of physical injury  Outcome: Progressing     Problem: Safety - Adult  Goal: Free from fall injury  Outcome: Progressing     Problem: Chronic Conditions and Co-morbidities  Goal: Patient's chronic conditions and co-morbidity symptoms are monitored and maintained or improved  Outcome: Progressing     Problem: Physical Therapy - Adult  Goal: By Discharge: Performs mobility at highest level of function for planned discharge setting.  See evaluation for individualized goals.  Description: Physical Therapy Goals:  Initiated 7/15/2024 to be met within 7-10 days.    1.  Patient will move from supine to sit and sit to supine  and roll side to side in bed with supervision/set-up.    2.  Patient will transfer from bed to chair and chair to bed with supervision/set-up using the least restrictive device.  3.  Patient will perform sit EOB with good balance for 10 minutes in prep for OOB activity.     PLOF: Patient was able to get EOB/OOB to electric WC with supervision of daughter. Prevalon boots must be worn at all times due to feet wounds.       7/18/2024 1242 by Maximilian Hernandez, PT  Outcome: Progressing

## 2024-07-19 NOTE — HOME CARE
[facility] Central Intake and [facility]  team  notified of patient transferring to SNF/Rehab : [facility] ; [facility] development team will continue to follow patient. CHRISTINA MCCLAIN.

## 2024-07-20 NOTE — CONSULTS
Podiatry History and Physical      Patient: Meggan Rossi               Sex: female          DOA: [unfilled]       YOB: 1950      Age:  73 y.o.        LOS:  LOS: 9 days     Subjective:         Date of Consultation:  July 20, 2024    Patient is a 73 y.o. female who is being seen for bilateral posterior heel wounds as well as left fifth MTPJ lateral wound. Patient has chronic wounds which are being treated by Dr. Hernandez at [address] [place] [address]. Patient recently had debridement with partial calcanectomy. They were planning to apply skin substitute graft however patient was admitted here and they asked me to apply graft. Denies N/V/C/F. Patient was admitted for heart failure.     Patient Active Problem List    Diagnosis Date Noted    CKD (chronic kidney disease), stage IV ([facility]) 07/11/2024    Anemia of chronic disease 07/11/2024    Acute on chronic congestive heart failure ([facility]) 07/10/2024    Pressure injury of skin of left heel 06/29/2023    Proteinuria 03/29/2021    Wheelchair dependence 03/05/2020    Neuropathic ulcer of heel, right, with fat layer exposed ([facility]) 03/05/2020    Systolic CHF, acute on chronic ([facility]) 12/13/2019    Elevated troponin 12/12/2019    Urinary retention 09/17/2019    Diabetic polyneuropathy associated with type 2 diabetes mellitus ([facility]) 03/26/2019    Non-healing wound of right heel 03/26/2019    Infected wound 12/27/2018    Heel ulcer ([facility]) 10/17/2018    Osteomyelitis ([facility]) 08/16/2018    Foot osteomyelitis, right ([facility]) 08/16/2018    Class 3 severe obesity with body mass index (BMI) of 40.0 to 44.9 in adult ([facility]) 05/24/2018    Obesity (BMI 30.0-34.9) 05/04/2018    Type 2 diabetes mellitus with diabetic polyneuropathy, with long-term current use of insulin ([facility]) 03/12/2018    Neurogenic bladder 10/12/2017    Pacemaker 09/13/2017    Hypervolemia 06/06/2017    Psoas abscess, right ([facility]) 04/20/2017    Urinary tract infection due to Enterococcus 04/20/2017    Sepsis ([facility]) 04/20/2017    Nicole    BYDUREON BCISE 2 MG/0.85ML injection INJECT 1 (ONE) INJECTION UNDER THE SKIN WEEKLY 23   Provider, MD Asif   hydrOXYzine HCl (ATARAX) 25 MG tablet Take 1 tablet by mouth 2 times daily as needed for Itching 21   Automatic Reconciliation, Ar     Allergies   Allergen Reactions    Latex Itching    Oxycodone-Aspirin Anaphylaxis     Other reaction(s): other/intolerance, unknown  Other reaction(s): unknown      Vancomycin Itching    Atorvastatin Myalgia    Blueberry Swelling     Causes throat swelling    Ciprofloxacin Itching    Codeine Other (See Comments)     Jumpy feeling    Diphenhydramine-Acetaminophen     Ezetimibe-Simvastatin Myalgia    Hydrocodone     Hydrocodone-Acetaminophen Itching    Hydromorphone      Other reaction(s): other/intolerance    Levofloxacin Itching    Macrobid [Nitrofurantoin]     Magnesium Oxide Itching     nausea    Meperidine Itching    Minocycline Itching    Penicillins Itching    Pravastatin Swelling     Swelling in mouth.   Not allergic per patient, takes at home    Propoxyphene Other (See Comments)    Rosuvastatin Itching    Shellfish Allergy Swelling     itching    Sulfa Antibiotics Itching    Sulfamethoxazole-Trimethoprim Itching    Tramadol     Tramadol-Acetaminophen Itching    Vaccinium Angustifolium     Oxycodone Itching        Review of Systems:  Pertinent items are noted in HPI.    Objective:     No data found.  Temp (24hrs), Av °F (36.7 °C), Min:98 °F (36.7 °C), Max:98 °F (36.7 °C)    Bilateral lower extremity physical exam:  Vascular: DP pulses palpable and +1/4 bilateral. PT pulses are palpable and +2/4 bilateral. Absent hair growth noticed on the dorsal foot and digits. Capillary refill time is less than three seconds to distal digits bilateral. 2+ pitting edema around bilateral LE.   Neurological: Protective sensation is diminished to all digits and to distal 5/10 sites of the foot bilateral upon testing with a Semmis Yue 5.07 monofilament. Vibratory

## 2024-07-22 NOTE — RESULT ENCOUNTER NOTE
Device check personally reviewed by me.  Pacer generator approaching elective replacement time, otherwise normal device function on interrogation.  See scanned interrogation document for complete details.

## 2024-07-29 NOTE — PROGRESS NOTES
Meggan Rossi presents today for a 3 month check-up.  She was last seen by Dr. Guzmán on 5/9/24.  She had an echo done on 7/13/24 and it showed an EF of 35-40%, mild concentric LVH, global hypokinesis, RVSP 23 mmHg.  On 7/17/24, she underwent successful biventricular pacemaker generator exchange (left abdominal upper quadrant) by Dr. Gardner. She was hospitalized from 7/10/24 through 7/19/24 for acute on chronic systolic heart failure exacerbation and hypoxia.  She was placed on an IV bumetanide drip and diuresed more than 2 liters of fluid.  Her Entresto was held due to renal insufficiency.     Upon discharge, she was transferred to a SNF for strengthening and rehab.  She was also hospitalized at [facility] from 5/30/24 through 6/4/24 for osteomyelitis of the left foot and bilateral heel ulcers.  She underwent bilateral heel debridement and left partial calcanectomy on 6/4/24.  She received IV antibiotics.      She is a 74 year old female with a history of a dilated cardiomyopathy, mild left ventricular dysfunction, and widely patent coronary arteries (cardiac catheterization done in August 1996).  She also has a biventricular pacemaker but not an AICD placed in October of 2012 (in abdomen).  She previously had a biventricular ICD but it was removed due to infection at the site.  She has history of DVT in the left axillary vein and DVT in the right CFV.  She has history of diabetes, diabetic neuropathy, hypercholesterolemia, non-ischemic cardiomyopathy, obstructive sleep apnea, obesity, gout.     A pharmacologic nuclear stress test was completed on 6/19/15 showed no reversible perfusion imaging abnormalities concerning for myocardial ischemia.  In April 2017, she fell at home, traumatized her back and developed an infected right psoas hematoma with development of an epidural abscess with neurological compromise with resulting paraplegia.  She had her pacemaker generator changed by Dr. Jang back on February

## 2024-07-29 NOTE — ROUTINE PROCESS
Patient is alert and oriented times three with no signs or symptoms of distress. Foot dressing is intact and pulses palpable normal appearance , no deformities , trachea midline

## 2024-08-08 ENCOUNTER — NURSE ONLY (OUTPATIENT)
Age: 74
End: 2024-08-08

## 2024-08-08 ENCOUNTER — OFFICE VISIT (OUTPATIENT)
Age: 74
End: 2024-08-08
Payer: MEDICARE

## 2024-08-08 VITALS
OXYGEN SATURATION: 98 % | SYSTOLIC BLOOD PRESSURE: 132 MMHG | WEIGHT: 232 LBS | BODY MASS INDEX: 36.41 KG/M2 | HEIGHT: 67 IN | HEART RATE: 77 BPM | DIASTOLIC BLOOD PRESSURE: 74 MMHG

## 2024-08-08 DIAGNOSIS — I10 ESSENTIAL (PRIMARY) HYPERTENSION: ICD-10-CM

## 2024-08-08 DIAGNOSIS — Z95.810 BIVENTRICULAR IMPLANTABLE CARDIOVERTER-DEFIBRILLATOR IN SITU: Primary | ICD-10-CM

## 2024-08-08 DIAGNOSIS — I50.22 CHRONIC SYSTOLIC CONGESTIVE HEART FAILURE (HCC): Primary | ICD-10-CM

## 2024-08-08 DIAGNOSIS — I42.8 NONISCHEMIC CARDIOMYOPATHY (HCC): ICD-10-CM

## 2024-08-08 DIAGNOSIS — Z95.810 BIVENTRICULAR IMPLANTABLE CARDIOVERTER-DEFIBRILLATOR IN SITU: ICD-10-CM

## 2024-08-08 PROBLEM — E11.22 TYPE 2 DIABETES MELLITUS WITH CHRONIC KIDNEY DISEASE (HCC): Chronic | Status: ACTIVE | Noted: 2021-03-29

## 2024-08-08 PROBLEM — G47.33 OBSTRUCTIVE SLEEP APNEA SYNDROME: Status: ACTIVE | Noted: 2019-03-26

## 2024-08-08 PROBLEM — M86.271 SUBACUTE OSTEOMYELITIS OF RIGHT ANKLE (HCC): Status: ACTIVE | Noted: 2019-03-26

## 2024-08-08 PROBLEM — L97.411 SKIN ULCER OF RIGHT HEEL, LIMITED TO BREAKDOWN OF SKIN (HCC): Status: ACTIVE | Noted: 2019-03-26

## 2024-08-08 PROBLEM — E11.42 POLYNEUROPATHY DUE TO TYPE 2 DIABETES MELLITUS (HCC): Status: ACTIVE | Noted: 2019-03-26

## 2024-08-08 PROBLEM — M86.9 OSTEOMYELITIS OF FOOT (HCC): Status: ACTIVE | Noted: 2024-05-31

## 2024-08-08 PROCEDURE — 99214 OFFICE O/P EST MOD 30 MIN: CPT | Performed by: NURSE PRACTITIONER

## 2024-08-08 PROCEDURE — 3075F SYST BP GE 130 - 139MM HG: CPT | Performed by: NURSE PRACTITIONER

## 2024-08-08 PROCEDURE — 1123F ACP DISCUSS/DSCN MKR DOCD: CPT | Performed by: NURSE PRACTITIONER

## 2024-08-08 PROCEDURE — 3078F DIAST BP <80 MM HG: CPT | Performed by: NURSE PRACTITIONER

## 2024-08-08 RX ORDER — CYCLOBENZAPRINE HCL 5 MG
5 TABLET ORAL 3 TIMES DAILY PRN
COMMUNITY

## 2024-08-08 RX ORDER — BISACODYL 10 MG
10 SUPPOSITORY, RECTAL RECTAL DAILY PRN
COMMUNITY

## 2024-08-08 RX ORDER — ASCORBIC ACID 500 MG
500 TABLET ORAL DAILY
COMMUNITY

## 2024-08-08 ASSESSMENT — ANXIETY QUESTIONNAIRES
GAD7 TOTAL SCORE: 0
1. FEELING NERVOUS, ANXIOUS, OR ON EDGE: NOT AT ALL
2. NOT BEING ABLE TO STOP OR CONTROL WORRYING: NOT AT ALL
7. FEELING AFRAID AS IF SOMETHING AWFUL MIGHT HAPPEN: NOT AT ALL
5. BEING SO RESTLESS THAT IT IS HARD TO SIT STILL: NOT AT ALL
3. WORRYING TOO MUCH ABOUT DIFFERENT THINGS: NOT AT ALL
4. TROUBLE RELAXING: NOT AT ALL
6. BECOMING EASILY ANNOYED OR IRRITABLE: NOT AT ALL

## 2024-08-08 ASSESSMENT — PATIENT HEALTH QUESTIONNAIRE - PHQ9
1. LITTLE INTEREST OR PLEASURE IN DOING THINGS: NOT AT ALL
SUM OF ALL RESPONSES TO PHQ QUESTIONS 1-9: 0
SUM OF ALL RESPONSES TO PHQ QUESTIONS 1-9: 0
SUM OF ALL RESPONSES TO PHQ9 QUESTIONS 1 & 2: 0
2. FEELING DOWN, DEPRESSED OR HOPELESS: NOT AT ALL
SUM OF ALL RESPONSES TO PHQ QUESTIONS 1-9: 0
SUM OF ALL RESPONSES TO PHQ QUESTIONS 1-9: 0

## 2024-08-08 ASSESSMENT — ENCOUNTER SYMPTOMS: SHORTNESS OF BREATH: 1

## 2024-08-08 NOTE — PATIENT INSTRUCTIONS
Restart Entresto 24/26 twice a day  Hold if blood pressure is less than 120 mmHg (systolic- top number) before taking medications  BMP to be done in 2-3 weeks (or with Dr. Garcia appointment)  Follow-up with Dr. Guzmán as scheduled and as needed

## 2024-08-08 NOTE — PROGRESS NOTES
Meggan Rossi presents today for   Chief Complaint   Patient presents with    Follow-up     3 month    Follow-Up from Hospital     [facility]       Meggan Rossi preferred language for health care discussion is english/other.    Is someone accompanying this pt? no    Is the patient using any DME equipment during OV? no    Depression Screening:  Depression: Not at risk (8/8/2024)    PHQ-2     PHQ-2 Score: 0        Learning Assessment:  Who is the primary learner? Patient    What is the preferred language for health care of the primary learner? ENGLISH    How does the primary learner prefer to learn new concepts? DEMONSTRATION    Answered By patient    Relationship to Learner SELF           Pt currently taking Anticoagulant therapy? Eliquis 5 mg bid    Pt currently taking Antiplatelet therapy ? no      Coordination of Care:  1. Have you been to the ER, urgent care clinic since your last visit? Hospitalized since your last visit? no    2. Have you seen or consulted any other health care providers outside of the [facility] since your last visit? Include any pap smears or colon screening. no

## 2024-10-31 ENCOUNTER — OFFICE VISIT (OUTPATIENT)
Age: 74
End: 2024-10-31

## 2024-10-31 ENCOUNTER — NURSE ONLY (OUTPATIENT)
Age: 74
End: 2024-10-31

## 2024-10-31 VITALS
HEART RATE: 71 BPM | BODY MASS INDEX: 36.41 KG/M2 | WEIGHT: 232 LBS | OXYGEN SATURATION: 100 % | DIASTOLIC BLOOD PRESSURE: 68 MMHG | SYSTOLIC BLOOD PRESSURE: 132 MMHG | HEIGHT: 67 IN

## 2024-10-31 DIAGNOSIS — I42.8 NONISCHEMIC CARDIOMYOPATHY (HCC): Primary | ICD-10-CM

## 2024-10-31 DIAGNOSIS — Z95.810 BIVENTRICULAR IMPLANTABLE CARDIOVERTER-DEFIBRILLATOR IN SITU: ICD-10-CM

## 2024-10-31 DIAGNOSIS — I10 ESSENTIAL (PRIMARY) HYPERTENSION: ICD-10-CM

## 2024-10-31 DIAGNOSIS — I42.8 NONISCHEMIC CARDIOMYOPATHY (HCC): ICD-10-CM

## 2024-10-31 DIAGNOSIS — Z95.810 BIVENTRICULAR IMPLANTABLE CARDIOVERTER-DEFIBRILLATOR IN SITU: Primary | ICD-10-CM

## 2024-10-31 RX ORDER — POTASSIUM CHLORIDE 1500 MG/1
40 TABLET, EXTENDED RELEASE ORAL DAILY
Qty: 180 TABLET | Refills: 3 | Status: SHIPPED | OUTPATIENT
Start: 2024-10-31

## 2024-10-31 RX ORDER — BUMETANIDE 2 MG/1
2 TABLET ORAL 2 TIMES DAILY
Qty: 180 TABLET | Refills: 3 | Status: SHIPPED | OUTPATIENT
Start: 2024-10-31

## 2024-10-31 ASSESSMENT — PATIENT HEALTH QUESTIONNAIRE - PHQ9
SUM OF ALL RESPONSES TO PHQ QUESTIONS 1-9: 0
1. LITTLE INTEREST OR PLEASURE IN DOING THINGS: NOT AT ALL
SUM OF ALL RESPONSES TO PHQ QUESTIONS 1-9: 0
2. FEELING DOWN, DEPRESSED OR HOPELESS: NOT AT ALL
SUM OF ALL RESPONSES TO PHQ9 QUESTIONS 1 & 2: 0
SUM OF ALL RESPONSES TO PHQ QUESTIONS 1-9: 0
SUM OF ALL RESPONSES TO PHQ QUESTIONS 1-9: 0

## 2024-10-31 NOTE — PROGRESS NOTES
Meggan Rossi presents today for   Chief Complaint   Patient presents with    Follow-up     5 month f/u     Edema     Lt leg,ankle and feet edema on/off    Device Check     Medtronic        Meggan Rossi preferred language for health care discussion is english/other.    Is someone accompanying this pt? yes    Is the patient using any DME equipment during OV? yes    Depression Screening:  Depression: Not at risk (10/31/2024)    PHQ-2     PHQ-2 Score: 0        Learning Assessment:  Who is the primary learner? Patient    What is the preferred language for health care of the primary learner? ENGLISH    How does the primary learner prefer to learn new concepts? DEMONSTRATION    Answered By patient    Relationship to Learner SELF           Pt currently taking Anticoagulant therapy? Eliquis 5 mg BID     Pt currently taking Antiplatelet therapy ? no      Coordination of Care:  1. Have you been to the ER, urgent care clinic since your last visit? Hospitalized since your last visit? yes    2. Have you seen or consulted any other health care providers outside of the [facility] since your last visit? Include any pap smears or colon screening. no

## 2024-10-31 NOTE — PROGRESS NOTES
Meggan Rossi    Chief Complaint   Patient presents with    Follow-up     5 month f/u     Edema     Lt leg,ankle and feet edema on/off    Device Check     Medtronic        HPI    Meggan Rossi is a 74 y.o. -American female with history of a dilated cardiomyopathy with mild left ventricular dysfunction and an ejection fraction in the 40 to 45% range with widely patent coronary arteries and a cardiac catheterization back in August 1996 who followed with Dr. Barger for years. An echocardiogram completed on May 5, 2017 suggested an ejection fraction of 50%.  She did have a biventricular AICD placed but due to trauma and infection in that area she had that removed and now she has a biventricular pacemaker with pulse generator residing in her left upper quadrant placed back in October 2012.     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     She had her pacemaker generator changed by Dr. Jang back on February 26, 2019.  It should be noted that she had an infected right foot and was on antibiotics for for some time before the generator change and she is now off antibiotics and foot is now healing by secondary intention and apparently is doing well according to the patient.      She has had some problems with chronic heart failure for which we have tried to adjust her diuretics. Her last echocardiogram was completed on August 17, 2020 demonstrated a left ventricular ejection fraction of 25 to 30% which was slightly improved from past studies and suggested some benefit from her ongoing Entresto therapy. She does have stage 3-4 chronic kidney disease which makes more aggressive diuresis a little challenging.    She is complaining of more swelling LLE>>RLE, she elevates her legs and keeps her head propped to sleep so her bumex was increasead. She is feeling better but not yet

## 2024-11-01 NOTE — RESULT ENCOUNTER NOTE
Device check personally reviewed by me.  Normal device function on interrogation.  Programming changes noted.  Atrial fibrillation episodes noted.  Patient on anticoagulation.  See scanned interrogation document for complete details.

## 2024-11-24 ENCOUNTER — APPOINTMENT (OUTPATIENT)
Facility: HOSPITAL | Age: 74
DRG: 812 | End: 2024-11-24
Payer: MEDICARE

## 2024-11-24 ENCOUNTER — HOSPITAL ENCOUNTER (INPATIENT)
Facility: HOSPITAL | Age: 74
LOS: 2 days | Discharge: HOME | DRG: 812 | End: 2024-11-26
Attending: STUDENT IN AN ORGANIZED HEALTH CARE EDUCATION/TRAINING PROGRAM | Admitting: INTERNAL MEDICINE
Payer: MEDICARE

## 2024-11-24 DIAGNOSIS — T83.511A URINARY TRACT INFECTION ASSOCIATED WITH INDWELLING URETHRAL CATHETER, INITIAL ENCOUNTER (HCC): ICD-10-CM

## 2024-11-24 DIAGNOSIS — K92.2 GASTROINTESTINAL HEMORRHAGE, UNSPECIFIED GASTROINTESTINAL HEMORRHAGE TYPE: Primary | ICD-10-CM

## 2024-11-24 DIAGNOSIS — D64.9 ANEMIA, UNSPECIFIED TYPE: ICD-10-CM

## 2024-11-24 DIAGNOSIS — N39.0 URINARY TRACT INFECTION ASSOCIATED WITH INDWELLING URETHRAL CATHETER, INITIAL ENCOUNTER (HCC): ICD-10-CM

## 2024-11-24 LAB
ALBUMIN SERPL-MCNC: 3 G/DL (ref 3.4–5)
ALBUMIN/GLOB SERPL: 0.7 (ref 0.8–1.7)
ALP SERPL-CCNC: 133 U/L (ref 45–117)
ALT SERPL-CCNC: 25 U/L (ref 13–56)
ANION GAP SERPL CALC-SCNC: 11 MMOL/L (ref 3–18)
ANION GAP SERPL CALC-SCNC: 12 MMOL/L (ref 3–18)
APPEARANCE UR: ABNORMAL
AST SERPL-CCNC: 34 U/L (ref 10–38)
BACTERIA URNS QL MICRO: NEGATIVE /HPF
BASOPHILS # BLD: 0.1 K/UL (ref 0–0.1)
BASOPHILS NFR BLD: 1 % (ref 0–2)
BILIRUB SERPL-MCNC: 0.8 MG/DL (ref 0.2–1)
BILIRUB UR QL: NEGATIVE
BUN SERPL-MCNC: 67 MG/DL (ref 7–18)
BUN SERPL-MCNC: 67 MG/DL (ref 7–18)
BUN/CREAT SERPL: 18 (ref 12–20)
BUN/CREAT SERPL: 19 (ref 12–20)
CALCIUM SERPL-MCNC: 8.7 MG/DL (ref 8.5–10.1)
CALCIUM SERPL-MCNC: 9.3 MG/DL (ref 8.5–10.1)
CHLORIDE SERPL-SCNC: 101 MMOL/L (ref 100–111)
CHLORIDE SERPL-SCNC: 104 MMOL/L (ref 100–111)
CO2 SERPL-SCNC: 24 MMOL/L (ref 21–32)
CO2 SERPL-SCNC: 24 MMOL/L (ref 21–32)
COLOR UR: YELLOW
CREAT SERPL-MCNC: 3.57 MG/DL (ref 0.6–1.3)
CREAT SERPL-MCNC: 3.72 MG/DL (ref 0.6–1.3)
DIFFERENTIAL METHOD BLD: ABNORMAL
EOSINOPHIL # BLD: 0.1 K/UL (ref 0–0.4)
EOSINOPHIL NFR BLD: 1 % (ref 0–5)
EPITH CASTS URNS QL MICRO: NORMAL /LPF (ref 0–5)
ERYTHROCYTE [DISTWIDTH] IN BLOOD BY AUTOMATED COUNT: 18.2 % (ref 11.6–14.5)
ERYTHROCYTE [DISTWIDTH] IN BLOOD BY AUTOMATED COUNT: 18.2 % (ref 11.6–14.5)
GLOBULIN SER CALC-MCNC: 4.2 G/DL (ref 2–4)
GLUCOSE BLD STRIP.AUTO-MCNC: 112 MG/DL (ref 70–110)
GLUCOSE SERPL-MCNC: 117 MG/DL (ref 74–99)
GLUCOSE SERPL-MCNC: 142 MG/DL (ref 74–99)
GLUCOSE UR STRIP.AUTO-MCNC: NEGATIVE MG/DL
HCT VFR BLD AUTO: 20.1 % (ref 35–45)
HCT VFR BLD AUTO: 22.2 % (ref 35–45)
HEMOCCULT STL QL: POSITIVE
HGB BLD-MCNC: 6.8 G/DL (ref 12–16)
HGB BLD-MCNC: 7.5 G/DL (ref 12–16)
HGB UR QL STRIP: NEGATIVE
HISTORY CHECK: NORMAL
IMM GRANULOCYTES # BLD AUTO: 0 K/UL (ref 0–0.04)
IMM GRANULOCYTES NFR BLD AUTO: 0 % (ref 0–0.5)
KETONES UR QL STRIP.AUTO: NEGATIVE MG/DL
LEUKOCYTE ESTERASE UR QL STRIP.AUTO: ABNORMAL
LYMPHOCYTES # BLD: 1.5 K/UL (ref 0.9–3.6)
LYMPHOCYTES NFR BLD: 15 % (ref 21–52)
MAGNESIUM SERPL-MCNC: 1.8 MG/DL (ref 1.6–2.6)
MCH RBC QN AUTO: 25.9 PG (ref 24–34)
MCH RBC QN AUTO: 26 PG (ref 24–34)
MCHC RBC AUTO-ENTMCNC: 33.8 G/DL (ref 31–37)
MCHC RBC AUTO-ENTMCNC: 33.8 G/DL (ref 31–37)
MCV RBC AUTO: 76.6 FL (ref 78–100)
MCV RBC AUTO: 76.7 FL (ref 78–100)
MONOCYTES # BLD: 0.7 K/UL (ref 0.05–1.2)
MONOCYTES NFR BLD: 7 % (ref 3–10)
NEUTS SEG # BLD: 7.5 K/UL (ref 1.8–8)
NEUTS SEG NFR BLD: 76 % (ref 40–73)
NITRITE UR QL STRIP.AUTO: POSITIVE
NRBC # BLD: 0 K/UL (ref 0–0.01)
NRBC # BLD: 0 K/UL (ref 0–0.01)
NRBC BLD-RTO: 0 PER 100 WBC
NRBC BLD-RTO: 0 PER 100 WBC
NT PRO BNP: 952 PG/ML (ref 0–900)
PH UR STRIP: 5.5 (ref 5–8)
PLATELET # BLD AUTO: 287 K/UL (ref 135–420)
PLATELET # BLD AUTO: 303 K/UL (ref 135–420)
PMV BLD AUTO: 8.9 FL (ref 9.2–11.8)
PMV BLD AUTO: 9.2 FL (ref 9.2–11.8)
POTASSIUM SERPL-SCNC: 3.8 MMOL/L (ref 3.5–5.5)
POTASSIUM SERPL-SCNC: 4 MMOL/L (ref 3.5–5.5)
PROT SERPL-MCNC: 7.2 G/DL (ref 6.4–8.2)
PROT UR STRIP-MCNC: NEGATIVE MG/DL
RBC # BLD AUTO: 2.62 M/UL (ref 4.2–5.3)
RBC # BLD AUTO: 2.9 M/UL (ref 4.2–5.3)
RBC #/AREA URNS HPF: NEGATIVE /HPF (ref 0–5)
SODIUM SERPL-SCNC: 137 MMOL/L (ref 136–145)
SODIUM SERPL-SCNC: 139 MMOL/L (ref 136–145)
SP GR UR REFRACTOMETRY: 1.01 (ref 1–1.03)
TROPONIN I SERPL HS-MCNC: 18 NG/L (ref 0–54)
TROPONIN I SERPL HS-MCNC: 21 NG/L (ref 0–54)
TSH SERPL DL<=0.05 MIU/L-ACNC: 0.84 UIU/ML (ref 0.36–3.74)
UROBILINOGEN UR QL STRIP.AUTO: 0.2 EU/DL (ref 0.2–1)
WBC # BLD AUTO: 8.8 K/UL (ref 4.6–13.2)
WBC # BLD AUTO: 9.9 K/UL (ref 4.6–13.2)
WBC URNS QL MICRO: NORMAL /HPF (ref 0–4)

## 2024-11-24 PROCEDURE — 80053 COMPREHEN METABOLIC PANEL: CPT

## 2024-11-24 PROCEDURE — 84484 ASSAY OF TROPONIN QUANT: CPT

## 2024-11-24 PROCEDURE — 99285 EMERGENCY DEPT VISIT HI MDM: CPT

## 2024-11-24 PROCEDURE — 1100000003 HC PRIVATE W/ TELEMETRY

## 2024-11-24 PROCEDURE — 6360000002 HC RX W HCPCS: Performed by: STUDENT IN AN ORGANIZED HEALTH CARE EDUCATION/TRAINING PROGRAM

## 2024-11-24 PROCEDURE — 84443 ASSAY THYROID STIM HORMONE: CPT

## 2024-11-24 PROCEDURE — 81001 URINALYSIS AUTO W/SCOPE: CPT

## 2024-11-24 PROCEDURE — 93005 ELECTROCARDIOGRAM TRACING: CPT | Performed by: STUDENT IN AN ORGANIZED HEALTH CARE EDUCATION/TRAINING PROGRAM

## 2024-11-24 PROCEDURE — 86923 COMPATIBILITY TEST ELECTRIC: CPT

## 2024-11-24 PROCEDURE — 82270 OCCULT BLOOD FECES: CPT

## 2024-11-24 PROCEDURE — 2580000003 HC RX 258: Performed by: STUDENT IN AN ORGANIZED HEALTH CARE EDUCATION/TRAINING PROGRAM

## 2024-11-24 PROCEDURE — 71045 X-RAY EXAM CHEST 1 VIEW: CPT

## 2024-11-24 PROCEDURE — 83735 ASSAY OF MAGNESIUM: CPT

## 2024-11-24 PROCEDURE — 83880 ASSAY OF NATRIURETIC PEPTIDE: CPT

## 2024-11-24 PROCEDURE — 85025 COMPLETE CBC W/AUTO DIFF WBC: CPT

## 2024-11-24 PROCEDURE — 86900 BLOOD TYPING SEROLOGIC ABO: CPT

## 2024-11-24 PROCEDURE — 86850 RBC ANTIBODY SCREEN: CPT

## 2024-11-24 PROCEDURE — 85027 COMPLETE CBC AUTOMATED: CPT

## 2024-11-24 PROCEDURE — 99223 1ST HOSP IP/OBS HIGH 75: CPT | Performed by: INTERNAL MEDICINE

## 2024-11-24 PROCEDURE — 86901 BLOOD TYPING SEROLOGIC RH(D): CPT

## 2024-11-24 PROCEDURE — 96374 THER/PROPH/DIAG INJ IV PUSH: CPT

## 2024-11-24 PROCEDURE — 82962 GLUCOSE BLOOD TEST: CPT

## 2024-11-24 PROCEDURE — 87086 URINE CULTURE/COLONY COUNT: CPT

## 2024-11-24 RX ORDER — ONDANSETRON 4 MG/1
4 TABLET, ORALLY DISINTEGRATING ORAL EVERY 8 HOURS PRN
Status: DISCONTINUED | OUTPATIENT
Start: 2024-11-24 | End: 2024-11-26 | Stop reason: HOSPADM

## 2024-11-24 RX ORDER — INSULIN LISPRO 100 [IU]/ML
0-4 INJECTION, SOLUTION INTRAVENOUS; SUBCUTANEOUS
Status: DISCONTINUED | OUTPATIENT
Start: 2024-11-24 | End: 2024-11-26 | Stop reason: HOSPADM

## 2024-11-24 RX ORDER — SODIUM CHLORIDE 9 MG/ML
INJECTION, SOLUTION INTRAVENOUS PRN
Status: DISCONTINUED | OUTPATIENT
Start: 2024-11-24 | End: 2024-11-26 | Stop reason: HOSPADM

## 2024-11-24 RX ORDER — SODIUM CHLORIDE 0.9 % (FLUSH) 0.9 %
5-40 SYRINGE (ML) INJECTION EVERY 12 HOURS SCHEDULED
Status: DISCONTINUED | OUTPATIENT
Start: 2024-11-24 | End: 2024-11-26 | Stop reason: HOSPADM

## 2024-11-24 RX ORDER — INSULIN GLARGINE 100 [IU]/ML
30 INJECTION, SOLUTION SUBCUTANEOUS NIGHTLY
Status: DISCONTINUED | OUTPATIENT
Start: 2024-11-24 | End: 2024-11-26 | Stop reason: HOSPADM

## 2024-11-24 RX ORDER — ATORVASTATIN CALCIUM 40 MG/1
80 TABLET, FILM COATED ORAL DAILY
Status: DISCONTINUED | OUTPATIENT
Start: 2024-11-25 | End: 2024-11-26 | Stop reason: HOSPADM

## 2024-11-24 RX ORDER — 0.9 % SODIUM CHLORIDE 0.9 %
250 INTRAVENOUS SOLUTION INTRAVENOUS ONCE
Status: COMPLETED | OUTPATIENT
Start: 2024-11-24 | End: 2024-11-24

## 2024-11-24 RX ORDER — POLYETHYLENE GLYCOL 3350 17 G/17G
17 POWDER, FOR SOLUTION ORAL DAILY PRN
Status: DISCONTINUED | OUTPATIENT
Start: 2024-11-24 | End: 2024-11-26 | Stop reason: HOSPADM

## 2024-11-24 RX ORDER — DEXTROSE MONOHYDRATE 100 MG/ML
INJECTION, SOLUTION INTRAVENOUS CONTINUOUS PRN
Status: DISCONTINUED | OUTPATIENT
Start: 2024-11-24 | End: 2024-11-26 | Stop reason: HOSPADM

## 2024-11-24 RX ORDER — HYDROXYZINE HYDROCHLORIDE 25 MG/1
25 TABLET, FILM COATED ORAL 2 TIMES DAILY PRN
Status: DISCONTINUED | OUTPATIENT
Start: 2024-11-24 | End: 2024-11-25

## 2024-11-24 RX ORDER — SODIUM CHLORIDE 0.9 % (FLUSH) 0.9 %
5-40 SYRINGE (ML) INJECTION PRN
Status: DISCONTINUED | OUTPATIENT
Start: 2024-11-24 | End: 2024-11-26 | Stop reason: HOSPADM

## 2024-11-24 RX ORDER — FOLIC ACID 1 MG/1
1000 TABLET ORAL DAILY
Status: DISCONTINUED | OUTPATIENT
Start: 2024-11-25 | End: 2024-11-26 | Stop reason: HOSPADM

## 2024-11-24 RX ORDER — CARVEDILOL 3.12 MG/1
6.25 TABLET ORAL 2 TIMES DAILY
Status: DISCONTINUED | OUTPATIENT
Start: 2024-11-25 | End: 2024-11-26 | Stop reason: HOSPADM

## 2024-11-24 RX ORDER — BUMETANIDE 1 MG/1
2 TABLET ORAL 2 TIMES DAILY
Status: DISCONTINUED | OUTPATIENT
Start: 2024-11-25 | End: 2024-11-26 | Stop reason: HOSPADM

## 2024-11-24 RX ORDER — ONDANSETRON 2 MG/ML
4 INJECTION INTRAMUSCULAR; INTRAVENOUS EVERY 6 HOURS PRN
Status: DISCONTINUED | OUTPATIENT
Start: 2024-11-24 | End: 2024-11-26 | Stop reason: HOSPADM

## 2024-11-24 RX ORDER — CYCLOBENZAPRINE HCL 5 MG
5 TABLET ORAL 3 TIMES DAILY PRN
Status: DISCONTINUED | OUTPATIENT
Start: 2024-11-24 | End: 2024-11-26 | Stop reason: HOSPADM

## 2024-11-24 RX ORDER — GLUCAGON 1 MG
1 KIT INJECTION PRN
Status: DISCONTINUED | OUTPATIENT
Start: 2024-11-24 | End: 2024-11-26 | Stop reason: HOSPADM

## 2024-11-24 RX ADMIN — SODIUM CHLORIDE 250 ML: 9 INJECTION, SOLUTION INTRAVENOUS at 17:19

## 2024-11-24 RX ADMIN — WATER 1000 MG: 1 INJECTION INTRAMUSCULAR; INTRAVENOUS; SUBCUTANEOUS at 17:16

## 2024-11-24 ASSESSMENT — PAIN - FUNCTIONAL ASSESSMENT
PAIN_FUNCTIONAL_ASSESSMENT: NONE - DENIES PAIN
PAIN_FUNCTIONAL_ASSESSMENT: ACTIVITIES ARE NOT PREVENTED

## 2024-11-24 ASSESSMENT — PAIN DESCRIPTION - ONSET: ONSET: ON-GOING

## 2024-11-24 ASSESSMENT — PAIN DESCRIPTION - DESCRIPTORS: DESCRIPTORS: ACHING;DISCOMFORT;NUMBNESS

## 2024-11-24 ASSESSMENT — PAIN DESCRIPTION - FREQUENCY: FREQUENCY: CONTINUOUS

## 2024-11-24 ASSESSMENT — PAIN DESCRIPTION - PAIN TYPE: TYPE: CHRONIC PAIN

## 2024-11-24 ASSESSMENT — PAIN DESCRIPTION - ORIENTATION: ORIENTATION: LEFT;POSTERIOR;PROXIMAL

## 2024-11-24 ASSESSMENT — PAIN SCALES - GENERAL: PAINLEVEL_OUTOF10: 8

## 2024-11-24 ASSESSMENT — PAIN DESCRIPTION - LOCATION: LOCATION: BUTTOCKS;SHOULDER

## 2024-11-24 NOTE — ED TRIAGE NOTES
Patient arrived to ER with complaints of feeling palpitations and shortness of breath. Patient states she started feeling symptoms this morning.

## 2024-11-24 NOTE — ED PROVIDER NOTES
Notable for the following components:    Nitrite, Urine Positive (*)     Leukocyte Esterase, Urine MODERATE (*)     All other components within normal limits   BASIC METABOLIC PANEL - Abnormal; Notable for the following components:    Glucose 117 (*)     BUN 67 (*)     Creatinine 3.57 (*)     Est, Glom Filt Rate 13 (*)     All other components within normal limits   CBC - Abnormal; Notable for the following components:    RBC 2.62 (*)     Hemoglobin 6.8 (*)     Hematocrit 20.1 (*)     MCV 76.7 (*)     RDW 18.2 (*)     All other components within normal limits   POC FECAL OCCULT BLOOD - Abnormal; Notable for the following components:    POC Occult Blood, Fecal Positive (*)     All other components within normal limits   CULTURE, URINE   TROPONIN   MAGNESIUM   TSH   URINALYSIS, MICRO   TROPONIN   TYPE AND SCREEN   PREPARE RBC (CROSSMATCH)       All other labs were within normal range or not returned as of this dictation.    EMERGENCY DEPARTMENT COURSE and DIFFERENTIAL DIAGNOSIS/MDM:   Vitals:    Vitals:    11/24/24 1445   BP: 136/62   Pulse: 84   Resp: 22   Temp: 98.4 °F (36.9 °C)   TempSrc: Oral   SpO2: 100%   Weight: 100 kg (220 lb 8 oz)   Height: 1.702 m (5' 7\")       Medical Decision Making  Amount and/or Complexity of Data Reviewed  Labs: ordered.  Radiology: ordered.  ECG/medicine tests: ordered.    Risk  Prescription drug management.  Decision regarding hospitalization.    Patient is a 74-year-old female presenting with palpitations, dizziness and weakness.  She is currently hemodynamically stable and overall appears well.  Would like to evaluate for etiologies to explain her symptoms.  Will check for electrolyte derangements, anemia, UTI and other abnormalities.  She is currently otherwise stable without focal neurologic deficits.  No interventions required at this time.    Rectal exam reveals light brown stool without any gross bleeding however it is FOBT positive.  Patient has large hemorrhoids on        (Please note that portions of this note were completed with a voice recognition program.  Efforts were made to edit the dictations but occasionally words are mis-transcribed.)    Ashlyn Oleary MD (electronically signed)  Attending Emergency Physician            Ashlyn Oleary MD  11/24/24 1937       Ashlyn Oleary MD  11/24/24 1947

## 2024-11-25 ENCOUNTER — APPOINTMENT (OUTPATIENT)
Facility: HOSPITAL | Age: 74
DRG: 812 | End: 2024-11-25
Payer: MEDICARE

## 2024-11-25 PROBLEM — K92.2 GASTROINTESTINAL HEMORRHAGE: Status: ACTIVE | Noted: 2024-11-25

## 2024-11-25 LAB
ANION GAP SERPL CALC-SCNC: 7 MMOL/L (ref 3–18)
BASOPHILS # BLD: 0.1 K/UL (ref 0–0.1)
BASOPHILS NFR BLD: 1 % (ref 0–2)
BUN SERPL-MCNC: 69 MG/DL (ref 7–18)
BUN/CREAT SERPL: 20 (ref 12–20)
CALCIUM SERPL-MCNC: 9.6 MG/DL (ref 8.5–10.1)
CHLORIDE SERPL-SCNC: 104 MMOL/L (ref 100–111)
CO2 SERPL-SCNC: 25 MMOL/L (ref 21–32)
CREAT SERPL-MCNC: 3.5 MG/DL (ref 0.6–1.3)
DIFFERENTIAL METHOD BLD: ABNORMAL
EKG ATRIAL RATE: 83 BPM
EKG DIAGNOSIS: NORMAL
EKG P AXIS: 80 DEGREES
EKG P-R INTERVAL: 148 MS
EKG Q-T INTERVAL: 452 MS
EKG QRS DURATION: 170 MS
EKG QTC CALCULATION (BAZETT): 531 MS
EKG R AXIS: 95 DEGREES
EKG T AXIS: 22 DEGREES
EKG VENTRICULAR RATE: 83 BPM
EOSINOPHIL # BLD: 0.2 K/UL (ref 0–0.4)
EOSINOPHIL NFR BLD: 3 % (ref 0–5)
ERYTHROCYTE [DISTWIDTH] IN BLOOD BY AUTOMATED COUNT: 18.4 % (ref 11.6–14.5)
EST. AVERAGE GLUCOSE BLD GHB EST-MCNC: 146 MG/DL
GLUCOSE BLD STRIP.AUTO-MCNC: 115 MG/DL (ref 70–110)
GLUCOSE BLD STRIP.AUTO-MCNC: 137 MG/DL (ref 70–110)
GLUCOSE BLD STRIP.AUTO-MCNC: 197 MG/DL (ref 70–110)
GLUCOSE BLD STRIP.AUTO-MCNC: 93 MG/DL (ref 70–110)
GLUCOSE SERPL-MCNC: 97 MG/DL (ref 74–99)
HBA1C MFR BLD: 6.7 % (ref 4.2–5.6)
HCT VFR BLD AUTO: 23.4 % (ref 35–45)
HCT VFR BLD AUTO: 25.8 % (ref 35–45)
HGB BLD-MCNC: 7.6 G/DL (ref 12–16)
HGB BLD-MCNC: 8.5 G/DL (ref 12–16)
IMM GRANULOCYTES # BLD AUTO: 0 K/UL (ref 0–0.04)
IMM GRANULOCYTES NFR BLD AUTO: 0 % (ref 0–0.5)
LYMPHOCYTES # BLD: 2.7 K/UL (ref 0.9–3.6)
LYMPHOCYTES NFR BLD: 29 % (ref 21–52)
MCH RBC QN AUTO: 25.5 PG (ref 24–34)
MCHC RBC AUTO-ENTMCNC: 32.5 G/DL (ref 31–37)
MCV RBC AUTO: 78.5 FL (ref 78–100)
MONOCYTES # BLD: 0.8 K/UL (ref 0.05–1.2)
MONOCYTES NFR BLD: 8 % (ref 3–10)
NEUTS SEG # BLD: 5.5 K/UL (ref 1.8–8)
NEUTS SEG NFR BLD: 59 % (ref 40–73)
NRBC # BLD: 0 K/UL (ref 0–0.01)
NRBC BLD-RTO: 0 PER 100 WBC
PLATELET # BLD AUTO: 308 K/UL (ref 135–420)
PMV BLD AUTO: 9.4 FL (ref 9.2–11.8)
POTASSIUM SERPL-SCNC: 3.9 MMOL/L (ref 3.5–5.5)
RBC # BLD AUTO: 2.98 M/UL (ref 4.2–5.3)
SODIUM SERPL-SCNC: 136 MMOL/L (ref 136–145)
WBC # BLD AUTO: 9.3 K/UL (ref 4.6–13.2)

## 2024-11-25 PROCEDURE — 6370000000 HC RX 637 (ALT 250 FOR IP): Performed by: INTERNAL MEDICINE

## 2024-11-25 PROCEDURE — 80048 BASIC METABOLIC PNL TOTAL CA: CPT

## 2024-11-25 PROCEDURE — P9016 RBC LEUKOCYTES REDUCED: HCPCS

## 2024-11-25 PROCEDURE — 6360000002 HC RX W HCPCS: Performed by: INTERNAL MEDICINE

## 2024-11-25 PROCEDURE — 83036 HEMOGLOBIN GLYCOSYLATED A1C: CPT

## 2024-11-25 PROCEDURE — 94761 N-INVAS EAR/PLS OXIMETRY MLT: CPT

## 2024-11-25 PROCEDURE — 2580000003 HC RX 258: Performed by: INTERNAL MEDICINE

## 2024-11-25 PROCEDURE — 85025 COMPLETE CBC W/AUTO DIFF WBC: CPT

## 2024-11-25 PROCEDURE — 85014 HEMATOCRIT: CPT

## 2024-11-25 PROCEDURE — 85018 HEMOGLOBIN: CPT

## 2024-11-25 PROCEDURE — 30233N1 TRANSFUSION OF NONAUTOLOGOUS RED BLOOD CELLS INTO PERIPHERAL VEIN, PERCUTANEOUS APPROACH: ICD-10-PCS | Performed by: STUDENT IN AN ORGANIZED HEALTH CARE EDUCATION/TRAINING PROGRAM

## 2024-11-25 PROCEDURE — 76770 US EXAM ABDO BACK WALL COMP: CPT

## 2024-11-25 PROCEDURE — 99232 SBSQ HOSP IP/OBS MODERATE 35: CPT | Performed by: STUDENT IN AN ORGANIZED HEALTH CARE EDUCATION/TRAINING PROGRAM

## 2024-11-25 PROCEDURE — 1100000003 HC PRIVATE W/ TELEMETRY

## 2024-11-25 PROCEDURE — 93010 ELECTROCARDIOGRAM REPORT: CPT | Performed by: INTERNAL MEDICINE

## 2024-11-25 PROCEDURE — 36430 TRANSFUSION BLD/BLD COMPNT: CPT

## 2024-11-25 PROCEDURE — 6370000000 HC RX 637 (ALT 250 FOR IP): Performed by: STUDENT IN AN ORGANIZED HEALTH CARE EDUCATION/TRAINING PROGRAM

## 2024-11-25 PROCEDURE — 82962 GLUCOSE BLOOD TEST: CPT

## 2024-11-25 PROCEDURE — 36415 COLL VENOUS BLD VENIPUNCTURE: CPT

## 2024-11-25 RX ORDER — HYDROCORTISONE 25 MG/G
CREAM TOPICAL 2 TIMES DAILY
Status: DISCONTINUED | OUTPATIENT
Start: 2024-11-25 | End: 2024-11-26 | Stop reason: HOSPADM

## 2024-11-25 RX ORDER — OXYCODONE AND ACETAMINOPHEN 5; 325 MG/1; MG/1
1 TABLET ORAL EVERY 6 HOURS PRN
Status: DISCONTINUED | OUTPATIENT
Start: 2024-11-25 | End: 2024-11-25

## 2024-11-25 RX ORDER — ACETAMINOPHEN 325 MG/1
650 TABLET ORAL EVERY 4 HOURS PRN
Status: DISCONTINUED | OUTPATIENT
Start: 2024-11-25 | End: 2024-11-26 | Stop reason: HOSPADM

## 2024-11-25 RX ORDER — DIPHENHYDRAMINE HCL 25 MG
25 CAPSULE ORAL EVERY 6 HOURS PRN
Status: DISCONTINUED | OUTPATIENT
Start: 2024-11-25 | End: 2024-11-26 | Stop reason: HOSPADM

## 2024-11-25 RX ORDER — LACTOBACILLUS RHAMNOSUS GG 10B CELL
1 CAPSULE ORAL
Status: DISCONTINUED | OUTPATIENT
Start: 2024-11-25 | End: 2024-11-26 | Stop reason: HOSPADM

## 2024-11-25 RX ORDER — HYDROXYZINE HYDROCHLORIDE 25 MG/1
25 TABLET, FILM COATED ORAL EVERY 6 HOURS PRN
Status: DISCONTINUED | OUTPATIENT
Start: 2024-11-25 | End: 2024-11-26 | Stop reason: HOSPADM

## 2024-11-25 RX ORDER — SACCHAROMYCES BOULARDII 250 MG
250 CAPSULE ORAL 2 TIMES DAILY
Status: DISCONTINUED | OUTPATIENT
Start: 2024-11-25 | End: 2024-11-25 | Stop reason: CLARIF

## 2024-11-25 RX ADMIN — FOLIC ACID 1000 MCG: 1 TABLET ORAL at 08:41

## 2024-11-25 RX ADMIN — WATER 1000 MG: 1 INJECTION INTRAMUSCULAR; INTRAVENOUS; SUBCUTANEOUS at 17:16

## 2024-11-25 RX ADMIN — SODIUM CHLORIDE, PRESERVATIVE FREE 10 ML: 5 INJECTION INTRAVENOUS at 08:41

## 2024-11-25 RX ADMIN — HYDROXYZINE HYDROCHLORIDE 25 MG: 25 TABLET, FILM COATED ORAL at 00:34

## 2024-11-25 RX ADMIN — CYCLOBENZAPRINE HYDROCHLORIDE 5 MG: 5 TABLET, FILM COATED ORAL at 15:21

## 2024-11-25 RX ADMIN — HYDROCORTISONE: 25 CREAM TOPICAL at 08:50

## 2024-11-25 RX ADMIN — ACETAMINOPHEN 325MG 650 MG: 325 TABLET ORAL at 16:16

## 2024-11-25 RX ADMIN — DIPHENHYDRAMINE HYDROCHLORIDE 25 MG: 25 CAPSULE ORAL at 14:21

## 2024-11-25 RX ADMIN — DIPHENHYDRAMINE HYDROCHLORIDE 25 MG: 25 CAPSULE ORAL at 02:52

## 2024-11-25 RX ADMIN — ACETAMINOPHEN 325MG 650 MG: 325 TABLET ORAL at 10:46

## 2024-11-25 RX ADMIN — HYDROXYZINE HYDROCHLORIDE 25 MG: 25 TABLET, FILM COATED ORAL at 10:46

## 2024-11-25 RX ADMIN — Medication 1 CAPSULE: at 08:41

## 2024-11-25 RX ADMIN — INSULIN LISPRO 1 UNITS: 100 INJECTION, SOLUTION INTRAVENOUS; SUBCUTANEOUS at 16:15

## 2024-11-25 RX ADMIN — ATORVASTATIN CALCIUM 80 MG: 40 TABLET, FILM COATED ORAL at 20:03

## 2024-11-25 RX ADMIN — OXYCODONE HYDROCHLORIDE AND ACETAMINOPHEN 1 TABLET: 5; 325 TABLET ORAL at 00:34

## 2024-11-25 ASSESSMENT — PAIN DESCRIPTION - LOCATION
LOCATION: RECTUM;SHOULDER
LOCATION: SHOULDER
LOCATION: HEAD;RECTUM
LOCATION: SHOULDER

## 2024-11-25 ASSESSMENT — PAIN SCALES - GENERAL
PAINLEVEL_OUTOF10: 2
PAINLEVEL_OUTOF10: 5
PAINLEVEL_OUTOF10: 3
PAINLEVEL_OUTOF10: 0
PAINLEVEL_OUTOF10: 0
PAINLEVEL_OUTOF10: 6
PAINLEVEL_OUTOF10: 8
PAINLEVEL_OUTOF10: 4
PAINLEVEL_OUTOF10: 5
PAINLEVEL_OUTOF10: 0

## 2024-11-25 ASSESSMENT — PAIN DESCRIPTION - PAIN TYPE
TYPE: ACUTE PAIN
TYPE: CHRONIC PAIN
TYPE: CHRONIC PAIN

## 2024-11-25 ASSESSMENT — PAIN DESCRIPTION - ORIENTATION
ORIENTATION: POSTERIOR;ANTERIOR
ORIENTATION: LEFT;POSTERIOR
ORIENTATION: LEFT;PROXIMAL
ORIENTATION: LEFT

## 2024-11-25 ASSESSMENT — PAIN DESCRIPTION - FREQUENCY
FREQUENCY: CONTINUOUS

## 2024-11-25 ASSESSMENT — PAIN - FUNCTIONAL ASSESSMENT
PAIN_FUNCTIONAL_ASSESSMENT: PREVENTS OR INTERFERES SOME ACTIVE ACTIVITIES AND ADLS
PAIN_FUNCTIONAL_ASSESSMENT: ACTIVITIES ARE NOT PREVENTED

## 2024-11-25 ASSESSMENT — PAIN DESCRIPTION - ONSET
ONSET: ON-GOING

## 2024-11-25 ASSESSMENT — PAIN DESCRIPTION - DESCRIPTORS
DESCRIPTORS: ACHING
DESCRIPTORS: ACHING;DULL
DESCRIPTORS: ACHING;TENDER;SORE
DESCRIPTORS: DULL;ACHING

## 2024-11-25 NOTE — CONSULTS
Consult Note  Consult requested by: Marvin Arellano MD Jetta D Flo is a 74 y.o. female Black /  who is being seen on consult for ALEXA  Chief Complaint   Patient presents with    Palpitations    Melena    Shortness of Breath     Admission diagnosis: Anemia     HPI:    Ms Rossi is a 74-year-old female with past medical history of diabetes, hypertension, severe nonischemic cardiomyopathy with a permanent pacemaker placement, history of spinal cord injury, history of urine retention with suprapubic catheter in place.       She had previous hospitalization  for decompensated heart failure. Entresto was temporarily discontinued but it looks like this was restarted again. She comes in for weakness, anemia and ?hemorrhoidal bleed. Received transfusion last pm.    She has a history of  dilated cardiomyopathy with mild left ventricular dysfunction and an ejection fraction in the 40 to 45% range with widely patent coronary arteries and a cardiac catheterization back in August 1996 who followed with Dr. Barger for years.        Past Medical History:   Diagnosis Date    Abnormal nuclear cardiac imaging test 06/19/2015    Fixed distal apical, distal septal defect more likely due to RV pacing than prior infarct.  No ischemia.  EF 46%.  RWMA c/w RV pacing.  Nondiagnostic EKG on pharm stress test.      Acute paraplegia (HCC) 4/20/2017    Benign hypertensive heart disease with systolic CHF, NYHA class 2 (HCC) 9/5/2012    Biventricular implantable cardioverter-defibrillator in situ 04/28/2005    Upgraded to BiV AICD; gen change 4/2008; pocket revision 10/2009; Abdominal - done on 8/22/2012 by Dr. Ashok Myers     Chronic anemia 9/5/2012    Chronic systolic heart failure (HCC)     Decreased calculated glomerular filtration rate (GFR) 3/30/2017    Calculated GFR equivalent to that of CKD stage 3 = 30-59 ml/min    Diabetic neuropathy associated with type 2 diabetes mellitus (HCC) 6/28/2011    Difficult airway for  0.25-14-74.9 % rectal ointment Unknown  No No   Sig: Place rectally 2 times daily as needed for Hemorrhoids   potassium chloride (KLOR-CON M) 20 MEQ extended release tablet 11/24/2024  No Yes   Sig: Take 2 tablets by mouth daily   psyllium husk-aspartame (METAMUCIL FIBER) 51.7 % PACK packet Not Taking  No No   Sig: Take 1 packet by mouth in the morning and at bedtime   Patient not taking: Reported on 11/24/2024   sacubitril-valsartan (ENTRESTO) 24-26 MG per tablet 11/23/2024  No Yes   Sig: Take 1 tablet by mouth 2 times daily   vitamin C (ASCORBIC ACID) 500 MG tablet Not Taking  Yes No   Sig: Take 1 tablet by mouth daily   Patient not taking: Reported on 11/24/2024   zinc sulfate (ZINCATE) 50 MG CAPS 220 mg capsule - elemental zinc 11/23/2024  Yes Yes   Sig: Take 1 capsule by mouth daily      Facility-Administered Medications: None       Current Facility-Administered Medications   Medication Dose Route Frequency    [Held by provider] sacubitril-valsartan (ENTRESTO) 24-26 MG per tablet 1 tablet  1 tablet Oral BID    hydrocortisone (ANUSOL-HC) 2.5 % rectal cream   Rectal BID    hydrOXYzine HCl (ATARAX) tablet 25 mg  25 mg Oral Q6H PRN    diphenhydrAMINE (BENADRYL) capsule 25 mg  25 mg Oral Q6H PRN    lactobacillus (CULTURELLE) capsule 1 capsule  1 capsule Oral Daily with breakfast    acetaminophen (TYLENOL) tablet 650 mg  650 mg Oral Q4H PRN    0.9 % sodium chloride infusion   IntraVENous PRN    cefTRIAXone (ROCEPHIN) 1,000 mg in sterile water 10 mL IV syringe  1,000 mg IntraVENous Q24H    [Held by provider] apixaban (ELIQUIS) tablet 5 mg  5 mg Oral BID    atorvastatin (LIPITOR) tablet 80 mg  80 mg Oral Daily    [Held by provider] bumetanide (BUMEX) tablet 2 mg  2 mg Oral BID    carvedilol (COREG) tablet 6.25 mg  6.25 mg Oral BID    cyclobenzaprine (FLEXERIL) tablet 5 mg  5 mg Oral TID PRN    folic acid (FOLVITE) tablet 1,000 mcg  1,000 mcg Oral Daily    [Held by provider] insulin glargine (LANTUS) injection vial 30

## 2024-11-25 NOTE — PLAN OF CARE
Problem: Chronic Conditions and Co-morbidities  Goal: Patient's chronic conditions and co-morbidity symptoms are monitored and maintained or improved  Outcome: Progressing  Flowsheets (Taken 11/24/2024 2230)  Care Plan - Patient's Chronic Conditions and Co-Morbidity Symptoms are Monitored and Maintained or Improved: Monitor and assess patient's chronic conditions and comorbid symptoms for stability, deterioration, or improvement     Problem: Pain  Goal: Verbalizes/displays adequate comfort level or baseline comfort level  Outcome: Progressing  Flowsheets (Taken 11/25/2024 0204)  Verbalizes/displays adequate comfort level or baseline comfort level:   Encourage patient to monitor pain and request assistance   Assess pain using appropriate pain scale   Administer analgesics based on type and severity of pain and evaluate response   Implement non-pharmacological measures as appropriate and evaluate response   Consider cultural and social influences on pain and pain management   Notify Licensed Independent Practitioner if interventions unsuccessful or patient reports new pain     Problem: Skin/Tissue Integrity  Goal: Absence of new skin breakdown  Description: 1.  Monitor for areas of redness and/or skin breakdown  2.  Assess vascular access sites hourly  3.  Every 4-6 hours minimum:  Change oxygen saturation probe site  4.  Every 4-6 hours:  If on nasal continuous positive airway pressure, respiratory therapy assess nares and determine need for appliance change or resting period.  Outcome: Progressing  Note: No new breakdown this shift       Problem: Safety - Adult  Goal: Free from fall injury  Outcome: Progressing  Flowsheets (Taken 11/25/2024 0204)  Free From Fall Injury:   Instruct family/caregiver on patient safety   Based on caregiver fall risk screen, instruct family/caregiver to ask for assistance with transferring infant if caregiver noted to have fall risk factors     Problem: ABCDS Injury Assessment  Goal:  Absence of physical injury  Outcome: Progressing  Flowsheets (Taken 11/25/2024 4127)  Absence of Physical Injury: Implement safety measures based on patient assessment

## 2024-11-25 NOTE — PLAN OF CARE
Problem: Chronic Conditions and Co-morbidities  Goal: Patient's chronic conditions and co-morbidity symptoms are monitored and maintained or improved  Description: Monitor vital signs and labs, assist patient with caring for her suprapubic cath as needed, wound care consult placed for chronic wounds  11/25/2024 1249 by Aster Saldana RN  Outcome: Progressing  11/25/2024 0204 by Nilda Avila RN  Outcome: Progressing  Flowsheets (Taken 11/24/2024 2230)  Care Plan - Patient's Chronic Conditions and Co-Morbidity Symptoms are Monitored and Maintained or Improved: Monitor and assess patient's chronic conditions and comorbid symptoms for stability, deterioration, or improvement     Problem: Discharge Planning  Goal: Discharge to home or other facility with appropriate resources  Description: Patient has support at home from daughter and medical equipment for baseline mobility level  Outcome: Progressing     Problem: Pain  Goal: Verbalizes/displays adequate comfort level or baseline comfort level  Description: Patient had a slight headache this AM that was improved with Tylenol.  Pt also has cream for hemorrhoids.  Assess pain every 4 hours and as needed and administer pain medications as ordered  11/25/2024 1249 by Aster Saldana RN  Outcome: Progressing  11/25/2024 0204 by iNlda Avila RN  Outcome: Progressing  Flowsheets (Taken 11/25/2024 0204)  Verbalizes/displays adequate comfort level or baseline comfort level:   Encourage patient to monitor pain and request assistance   Assess pain using appropriate pain scale   Administer analgesics based on type and severity of pain and evaluate response   Implement non-pharmacological measures as appropriate and evaluate response   Consider cultural and social influences on pain and pain management   Notify Licensed Independent Practitioner if interventions unsuccessful or patient reports new pain     Problem: Skin/Tissue Integrity  Goal: Absence of new skin

## 2024-11-25 NOTE — PROGRESS NOTES
MD notified of pt's bilat heels fish graft. Pt states home health nurse changes dressings 3 x weekly. New orders for wound care consult placed.

## 2024-11-25 NOTE — H&P
mg/dL    Glucose, Ur Negative NEG mg/dL    Ketones, Urine Negative NEG mg/dL    Bilirubin, Urine Negative NEG      Blood, Urine Negative NEG      Urobilinogen, Urine 0.2 0.2 - 1.0 EU/dL    Nitrite, Urine Positive (A) NEG      Leukocyte Esterase, Urine MODERATE (A) NEG     Urinalysis, Micro    Collection Time: 11/24/24  4:05 PM   Result Value Ref Range    WBC, UA 4-10 0 - 4 /hpf    RBC, UA Negative 0 - 5 /hpf    Epithelial Cells, UA 1+ 0 - 5 /lpf    BACTERIA, URINE Negative NEG /hpf   POC Fecal Occult Blood    Collection Time: 11/24/24  4:38 PM   Result Value Ref Range    POC Occult Blood, Fecal Positive (A) NEG     Basic Metabolic Panel    Collection Time: 11/24/24  6:08 PM   Result Value Ref Range    Sodium 139 136 - 145 mmol/L    Potassium 3.8 3.5 - 5.5 mmol/L    Chloride 104 100 - 111 mmol/L    CO2 24 21 - 32 mmol/L    Anion Gap 11 3.0 - 18 mmol/L    Glucose 117 (H) 74 - 99 mg/dL    BUN 67 (H) 7.0 - 18 MG/DL    Creatinine 3.57 (H) 0.6 - 1.3 MG/DL    BUN/Creatinine Ratio 19 12 - 20      Est, Glom Filt Rate 13 (L) >60 ml/min/1.73m2    Calcium 8.7 8.5 - 10.1 MG/DL   CBC    Collection Time: 11/24/24  6:08 PM   Result Value Ref Range    WBC 8.8 4.6 - 13.2 K/uL    RBC 2.62 (L) 4.20 - 5.30 M/uL    Hemoglobin 6.8 (L) 12.0 - 16.0 g/dL    Hematocrit 20.1 (L) 35.0 - 45.0 %    MCV 76.7 (L) 78.0 - 100.0 FL    MCH 26.0 24.0 - 34.0 PG    MCHC 33.8 31.0 - 37.0 g/dL    RDW 18.2 (H) 11.6 - 14.5 %    Platelets 287 135 - 420 K/uL    MPV 9.2 9.2 - 11.8 FL    Nucleated RBCs 0.0 0  WBC    nRBC 0.00 0.00 - 0.01 K/uL   Troponin    Collection Time: 11/24/24  6:08 PM   Result Value Ref Range    Troponin, High Sensitivity 18 0 - 54 ng/L   TYPE AND SCREEN    Collection Time: 11/24/24  7:15 PM   Result Value Ref Range    Crossmatch expiration date 11/27/2024,0901     ABO/Rh O POSITIVE     Antibody Screen NEG     Unit Number [id number]     Product Code Blood Bank  LR     Unit Divison 00     Dispense Status Blood Bank ALLOCATED      Crossmatch Result Compatible    PREPARE RBC (CROSSMATCH), 1 Units    Collection Time: 11/24/24  7:15 PM   Result Value Ref Range    History Check Historical check performed           Procedures/imaging: see electronic medical records for all procedures/Xrays and details which were not copied into this note but were reviewed prior to creation of Plan    Reviewed care everywhere records  Orders labs/imaging  Discussed plan with provider -         DVT/GI Prophylaxis: SCD's          75 minutes were spent in total including face-to-face interview, physical examination of the patient, review of relevant prior medical records, review of current medications and labs, review of relevant diagnostic imaging, discussing treatment plan with pt / family , nursing and specialist, ordering additional studies and documentation in the record.      Edgar Quigley MD  11/24/2024 11:02 PM               Disclaimer: Sections of this note are dictated using utilizing voice recognition software. Minor typographical errors may be present. If questions arise, please do not hesitate to contact me or call our department.

## 2024-11-25 NOTE — PLAN OF CARE
Problem: Hematologic - Adult  Goal: Maintains hematologic stability  Outcome: Progressing  Flowsheets (Taken 11/25/2024 7119)  Maintains hematologic stability:   Assess for signs and symptoms of bleeding or hemorrhage   Monitor labs for bleeding or clotting disorders   Administer blood products/factors as ordered  Note: Pt received 1 unit of PRBCs. Tolerated well. Monitor H&H and vital signs.

## 2024-11-25 NOTE — PROGRESS NOTES
[facility]ist [facility]  Progress Note    Patient: Meggan Rossi Age: 74 y.o. : 1950 MR#: [id number] SSN: [id number]  Date/Time: 2024    Subjective:   Subjective   No acute overnight events. Dizziness and palpitations have resolved. Reports rectal pain and itching. States she has chronic constipation and spends up to 2.5 hours a day on the commode. Has not had a BM since being admitted.       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 Most recent Hgb 8.5.   Continue Anusol.  Seen by nephrology, appreciate recommendations. To summarize, hold all diuretics and entresto. Renal US ordered, continue to monitor suprapubic cath.   Discussed patient with GI, they do not recommend any interventions while admitted. Has appointment scheduled in January. Will provided contact for colorectal to see if they can get her in sooner for eval of hemorrhoids.  Will continue to monitor CBC, likely discharge tomorrow.     Disposition: Expected location: Home     Expected discharge date: 2024      Case discussed with:  [x]Patient  []Family  [x]Nursing  [x]Case Management  DVT Prophylaxis:  []Lovenox  []Hep SQ  [x]SCDs  []Coumadin   []On Heparin gtt   [] DOAC    Objective:   Objective:  General Appearance:  Comfortable.    Vital signs: (most recent): Blood pressure (!) 102/49, pulse 67, temperature 97.7 °F (36.5 °C), temperature source Oral, resp. rate 18, height 1.702 m (5' 7\"), weight 100 kg (220 lb 8 oz), SpO2 99%.

## 2024-11-25 NOTE — PROGRESS NOTES
4 Eyes Skin Assessment     NAME:  Meggan Rossi  YOB: 1950  MEDICAL RECORD NUMBER:  [id number]    The patient is being assessed for  Admission    I agree that at least one RN has performed a thorough Head to Toe Skin Assessment on the patient. ALL assessment sites listed below have been assessed.      Areas assessed by both nurses:    Head, Face, Ears, Shoulders, Back, Chest, Arms, Elbows, Hands, Sacrum. Buttock, Coccyx, Ischium, Legs. Feet and Heels, and Under Medical Devices         Does the Patient have a Wound? Yes wound(s) were present on assessment. LDA wound assessment was Initiated and completed by RN       Cl Prevention initiated by RN: Yes  Wound Care Orders initiated by RN: No    Pressure Injury (Stage 3,4, Unstageable, DTI, NWPT, and Complex wounds) if present, place Wound referral order by RN under : Yes    New Ostomies, if present place, Ostomy referral order under : No     Nurse 1 eSignature: Electronically signed by AVTAR TOLEDO RN on 11/25/24 at 6:27 AM EST    **SHARE this note so that the co-signing nurse can place an eSignature**    Nurse 2 eSignature: Electronically signed by Aster Saldana RN on 11/25/24 at 7:10 AM EST

## 2024-11-26 VITALS
OXYGEN SATURATION: 96 % | SYSTOLIC BLOOD PRESSURE: 124 MMHG | HEART RATE: 70 BPM | TEMPERATURE: 97.9 F | RESPIRATION RATE: 17 BRPM | DIASTOLIC BLOOD PRESSURE: 60 MMHG | WEIGHT: 220.5 LBS | HEIGHT: 67 IN | BODY MASS INDEX: 34.61 KG/M2

## 2024-11-26 LAB
ABO + RH BLD: NORMAL
ALBUMIN SERPL-MCNC: 2.5 G/DL (ref 3.4–5)
ANION GAP SERPL CALC-SCNC: 8 MMOL/L (ref 3–18)
ANION GAP SERPL CALC-SCNC: 9 MMOL/L (ref 3–18)
BACTERIA SPEC CULT: NORMAL
BASOPHILS # BLD: 0.1 K/UL (ref 0–0.1)
BASOPHILS NFR BLD: 1 % (ref 0–2)
BLD PROD TYP BPU: NORMAL
BLOOD BANK BLOOD PRODUCT EXPIRATION DATE: NORMAL
BLOOD BANK DISPENSE STATUS: NORMAL
BLOOD BANK ISBT PRODUCT BLOOD TYPE: 5100
BLOOD BANK PRODUCT CODE: NORMAL
BLOOD BANK UNIT TYPE AND RH: NORMAL
BLOOD GROUP ANTIBODIES SERPL: NORMAL
BPU ID: NORMAL
BUN SERPL-MCNC: 64 MG/DL (ref 7–18)
BUN SERPL-MCNC: 64 MG/DL (ref 7–18)
BUN/CREAT SERPL: 20 (ref 12–20)
BUN/CREAT SERPL: 20 (ref 12–20)
CALCIUM SERPL-MCNC: 8.9 MG/DL (ref 8.5–10.1)
CALCIUM SERPL-MCNC: 9.2 MG/DL (ref 8.5–10.1)
CC UR VC: NORMAL
CHLORIDE SERPL-SCNC: 108 MMOL/L (ref 100–111)
CHLORIDE SERPL-SCNC: 109 MMOL/L (ref 100–111)
CO2 SERPL-SCNC: 23 MMOL/L (ref 21–32)
CO2 SERPL-SCNC: 24 MMOL/L (ref 21–32)
CREAT SERPL-MCNC: 3.2 MG/DL (ref 0.6–1.3)
CREAT SERPL-MCNC: 3.2 MG/DL (ref 0.6–1.3)
CROSSMATCH RESULT: NORMAL
DIFFERENTIAL METHOD BLD: ABNORMAL
EOSINOPHIL # BLD: 0.3 K/UL (ref 0–0.4)
EOSINOPHIL NFR BLD: 5 % (ref 0–5)
ERYTHROCYTE [DISTWIDTH] IN BLOOD BY AUTOMATED COUNT: 17.9 % (ref 11.6–14.5)
GLUCOSE BLD STRIP.AUTO-MCNC: 105 MG/DL (ref 70–110)
GLUCOSE BLD STRIP.AUTO-MCNC: 134 MG/DL (ref 70–110)
GLUCOSE SERPL-MCNC: 87 MG/DL (ref 74–99)
GLUCOSE SERPL-MCNC: 89 MG/DL (ref 74–99)
HCT VFR BLD AUTO: 24.8 % (ref 35–45)
HGB BLD-MCNC: 8.1 G/DL (ref 12–16)
IMM GRANULOCYTES # BLD AUTO: 0 K/UL (ref 0–0.04)
IMM GRANULOCYTES NFR BLD AUTO: 0 % (ref 0–0.5)
LYMPHOCYTES # BLD: 2.2 K/UL (ref 0.9–3.6)
LYMPHOCYTES NFR BLD: 31 % (ref 21–52)
MCH RBC QN AUTO: 26.1 PG (ref 24–34)
MCHC RBC AUTO-ENTMCNC: 32.7 G/DL (ref 31–37)
MCV RBC AUTO: 80 FL (ref 78–100)
MONOCYTES # BLD: 0.7 K/UL (ref 0.05–1.2)
MONOCYTES NFR BLD: 10 % (ref 3–10)
NEUTS SEG # BLD: 3.8 K/UL (ref 1.8–8)
NEUTS SEG NFR BLD: 54 % (ref 40–73)
NRBC # BLD: 0 K/UL (ref 0–0.01)
NRBC BLD-RTO: 0 PER 100 WBC
PHOSPHATE SERPL-MCNC: 3.8 MG/DL (ref 2.5–4.9)
PLATELET # BLD AUTO: 269 K/UL (ref 135–420)
PMV BLD AUTO: 9.6 FL (ref 9.2–11.8)
POTASSIUM SERPL-SCNC: 3.8 MMOL/L (ref 3.5–5.5)
POTASSIUM SERPL-SCNC: 4 MMOL/L (ref 3.5–5.5)
RBC # BLD AUTO: 3.1 M/UL (ref 4.2–5.3)
SERVICE CMNT-IMP: NORMAL
SODIUM SERPL-SCNC: 139 MMOL/L (ref 136–145)
SODIUM SERPL-SCNC: 142 MMOL/L (ref 136–145)
SPECIMEN EXP DATE BLD: NORMAL
UNIT DIVISION: 0
UNIT ISSUE DATE/TIME: NORMAL
WBC # BLD AUTO: 7.1 K/UL (ref 4.6–13.2)

## 2024-11-26 PROCEDURE — 2580000003 HC RX 258: Performed by: INTERNAL MEDICINE

## 2024-11-26 PROCEDURE — 6370000000 HC RX 637 (ALT 250 FOR IP): Performed by: INTERNAL MEDICINE

## 2024-11-26 PROCEDURE — 85025 COMPLETE CBC W/AUTO DIFF WBC: CPT

## 2024-11-26 PROCEDURE — 82962 GLUCOSE BLOOD TEST: CPT

## 2024-11-26 PROCEDURE — 80069 RENAL FUNCTION PANEL: CPT

## 2024-11-26 PROCEDURE — 80048 BASIC METABOLIC PNL TOTAL CA: CPT

## 2024-11-26 PROCEDURE — 99239 HOSP IP/OBS DSCHRG MGMT >30: CPT | Performed by: STUDENT IN AN ORGANIZED HEALTH CARE EDUCATION/TRAINING PROGRAM

## 2024-11-26 PROCEDURE — 36415 COLL VENOUS BLD VENIPUNCTURE: CPT

## 2024-11-26 PROCEDURE — 94761 N-INVAS EAR/PLS OXIMETRY MLT: CPT

## 2024-11-26 PROCEDURE — 6370000000 HC RX 637 (ALT 250 FOR IP): Performed by: STUDENT IN AN ORGANIZED HEALTH CARE EDUCATION/TRAINING PROGRAM

## 2024-11-26 RX ORDER — LACTULOSE 10 G/15ML
20 SOLUTION ORAL 3 TIMES DAILY
Status: DISCONTINUED | OUTPATIENT
Start: 2024-11-26 | End: 2024-11-26 | Stop reason: HOSPADM

## 2024-11-26 RX ORDER — LACTULOSE 10 G/15ML
20 SOLUTION ORAL 3 TIMES DAILY
Status: DISCONTINUED | OUTPATIENT
Start: 2024-11-26 | End: 2024-11-26

## 2024-11-26 RX ORDER — DOCUSATE SODIUM 100 MG/1
100 CAPSULE, LIQUID FILLED ORAL 2 TIMES DAILY
Qty: 60 CAPSULE | Refills: 0 | Status: SHIPPED | OUTPATIENT
Start: 2024-11-26 | End: 2024-12-26

## 2024-11-26 RX ORDER — HYDROCORTISONE 25 MG/G
CREAM TOPICAL
Qty: 28 G | Refills: 1 | Status: SHIPPED | OUTPATIENT
Start: 2024-11-26

## 2024-11-26 RX ORDER — LACTULOSE 10 G/15ML
20 SOLUTION ORAL 3 TIMES DAILY PRN
Qty: 473 ML | Refills: 1 | Status: SHIPPED | OUTPATIENT
Start: 2024-11-26

## 2024-11-26 RX ORDER — DOCUSATE SODIUM 100 MG/1
100 CAPSULE, LIQUID FILLED ORAL 2 TIMES DAILY PRN
Qty: 60 CAPSULE | Refills: 0 | Status: SHIPPED | OUTPATIENT
Start: 2024-11-26

## 2024-11-26 RX ADMIN — FOLIC ACID 1000 MCG: 1 TABLET ORAL at 08:06

## 2024-11-26 RX ADMIN — LACTULOSE 20 G: 20 SOLUTION ORAL at 11:39

## 2024-11-26 RX ADMIN — HYDROCORTISONE: 25 CREAM TOPICAL at 11:38

## 2024-11-26 RX ADMIN — Medication 1 CAPSULE: at 08:06

## 2024-11-26 RX ADMIN — SODIUM CHLORIDE, PRESERVATIVE FREE 10 ML: 5 INJECTION INTRAVENOUS at 08:09

## 2024-11-26 RX ADMIN — CARVEDILOL 6.25 MG: 3.12 TABLET, FILM COATED ORAL at 08:06

## 2024-11-26 RX ADMIN — SODIUM CHLORIDE, PRESERVATIVE FREE 10 ML: 5 INJECTION INTRAVENOUS at 05:04

## 2024-11-26 RX ADMIN — ACETAMINOPHEN 325MG 650 MG: 325 TABLET ORAL at 08:06

## 2024-11-26 RX ADMIN — POLYETHYLENE GLYCOL 3350 17 G: 17 POWDER, FOR SOLUTION ORAL at 08:06

## 2024-11-26 RX ADMIN — CYCLOBENZAPRINE HYDROCHLORIDE 5 MG: 5 TABLET, FILM COATED ORAL at 02:07

## 2024-11-26 ASSESSMENT — PAIN DESCRIPTION - LOCATION
LOCATION: BACK
LOCATION: BACK
LOCATION: HEAD

## 2024-11-26 ASSESSMENT — PAIN SCALES - GENERAL
PAINLEVEL_OUTOF10: 0
PAINLEVEL_OUTOF10: 0
PAINLEVEL_OUTOF10: 10
PAINLEVEL_OUTOF10: 0
PAINLEVEL_OUTOF10: 0
PAINLEVEL_OUTOF10: 3
PAINLEVEL_OUTOF10: 0

## 2024-11-26 ASSESSMENT — PAIN DESCRIPTION - ORIENTATION: ORIENTATION: RIGHT;LEFT;MID

## 2024-11-26 ASSESSMENT — PAIN - FUNCTIONAL ASSESSMENT: PAIN_FUNCTIONAL_ASSESSMENT: ACTIVITIES ARE NOT PREVENTED

## 2024-11-26 ASSESSMENT — PAIN DESCRIPTION - DESCRIPTORS: DESCRIPTORS: ACHING

## 2024-11-26 ASSESSMENT — PAIN DESCRIPTION - PAIN TYPE: TYPE: ACUTE PAIN

## 2024-11-26 ASSESSMENT — PAIN DESCRIPTION - FREQUENCY: FREQUENCY: INTERMITTENT

## 2024-11-26 ASSESSMENT — PAIN DESCRIPTION - ONSET: ONSET: GRADUAL

## 2024-11-26 NOTE — PROGRESS NOTES
Advance Care Planning   Healthcare Decision Maker:    Primary Decision Maker: Tanisha AvilaYecenia - Child - [phone]    Primary Decision Maker: JOSEPHINE AVILA - Child - [phone]    Today we documented Decision Maker(s) consistent with Legal Next of Kin hierarchy.     Spiritual Health History and Assessment/Progress Note  [facility]    (P) Spiritual/Emotional Needs, Advance Care Planning,  ,  ,      Name: Meggan Rossi MRN: [id number]    Age: 74 y.o.     Sex: female   Language: English   Alevism: Scientology   Anemia     Date: 11/26/2024            Total Time Calculated: (P) 7 min              Spiritual Assessment began in [facility]        Referral/Consult From: (P) Multi-disciplinary team   Encounter Overview/Reason: (P) Spiritual/Emotional Needs, Advance Care Planning  Service Provided For: (P) Patient and family together    Keily, Belief, Meaning:   Patient has beliefs or practices that help with coping during difficult times  Family/Friends have beliefs or practices that help with coping during difficult times      Importance and Influence:  Patient has spiritual/personal beliefs that influence decisions regarding their health  Family/Friends have spiritual/personal beliefs that influence decisions regarding the patient's health    Community:  Patient feels well-supported. Support system includes: Children  Family/Friends are connected with a spiritual community:    Assessment and Plan of Care:     Patient Interventions include: Facilitated expression of thoughts and feelings  Family/Friends Interventions include: Affirmed coping skills/support systems    Patient Plan of Care: No spiritual needs identified for follow-up  Family/Friends Plan of Care: No spiritual needs identified for follow-up    Electronically signed by FINA Crane on 11/26/2024 at 12:41 PM

## 2024-11-26 NOTE — CONSULTS
Wound care consult noted for \"bilateral feet wounds\". Patient is followed by Dr. Hernandez, podiatrist, and on Thursday 11/22/24 underwent grafting. These grafts should be minimally disturbed.  Dressing change was provided by unit staff yesterday. However, patient and her daughter are able to provide wound care instructions, step by step.  I have written these in an order, unit staff may provide wound care if patient remains inpatient. Patient has a follow up appointment with Dr. Hernandez planned for Friday 11/29/24.  Wound care team will sign off for now.    Miri HUERTAN, RN, [facility], COCN, CLIN IV  [facility] Wound Care Dept.  Office: [phone]  Work Cell: [phone]

## 2024-11-26 NOTE — PROGRESS NOTES
4 Eyes Skin Assessment     NAME:  Meggan Rossi  YOB: 1950  MEDICAL RECORD NUMBER:  [id number]    The patient is being assessed for  Shift Handoff    I agree that at least one RN has performed a thorough Head to Toe Skin Assessment on the patient. ALL assessment sites listed below have been assessed.      Areas assessed by both nurses:    Head, Face, Ears, Shoulders, Back, Chest, Arms, Elbows, Hands, Sacrum. Buttock, Coccyx, Ischium, Legs. Feet and Heels, and Under Medical Devices         Does the Patient have a Wound? Yes wound(s) were present on assessment. LDA wound assessment was Initiated and completed by RN       Cl Prevention initiated by RN: Yes  Wound Care Orders initiated by RN: No    Pressure Injury (Stage 3,4, Unstageable, DTI, NWPT, and Complex wounds) if present, place Wound referral order by RN under : Yes    New Ostomies, if present place, Ostomy referral order under : No     Nurse 1 eSignature: Electronically signed by Aster Saldana RN on 11/25/24 at 7:19 PM EST    **SHARE this note so that the co-signing nurse can place an eSignature**    Nurse 2 eSignature: {Esignature:[id number]}

## 2024-11-26 NOTE — PROGRESS NOTES
RENAL DAILY PROGRESS NOTE    Subjective:       Complaint:   Overnight events noted  no nausea, vomiting, chest pain  USG kidneys:  Significant increased parenchymal echogenicity is consistent with medical renal  disease. There is also some cortical thinning and increased pelvic lipomatosis,  bilaterally.  No hydronephrosis or nephrolithiasis.     The bladder is decompressed by suprapubic catheter and not well seen.    IMPRESSION:   ALEXA in setting of blood loss anemia(GI source?), med use such as entresto,diuretics etc. Suspect overdiuresis as the cause of ALEXA. UA is overall benign.CXR neg.  Hx of urinary retention, frequent UTI, now has chronic supra pubic catheter  NICMO with EF around 35-40 now  CKD stage 3b  Hyperptension  Hx diabetic nephropathy   PLAN:   Resume bumex 1 mg po daily from tomorrow.we will follow up as OP for further adjustments.  Renal USG reviewed  I and O  Daily weights  Avoid nephrotoxins  C/w supra pubic cath monitoring         Current Facility-Administered Medications   Medication Dose Route Frequency    lactulose (CHRONULAC) 10 GM/15ML solution 20 g  20 g Oral TID    [Held by provider] sacubitril-valsartan (ENTRESTO) 24-26 MG per tablet 1 tablet  1 tablet Oral BID    hydrocortisone (ANUSOL-HC) 2.5 % rectal cream   Rectal BID    hydrOXYzine HCl (ATARAX) tablet 25 mg  25 mg Oral Q6H PRN    diphenhydrAMINE (BENADRYL) capsule 25 mg  25 mg Oral Q6H PRN    lactobacillus (CULTURELLE) capsule 1 capsule  1 capsule Oral Daily with breakfast    acetaminophen (TYLENOL) tablet 650 mg  650 mg Oral Q4H PRN    0.9 % sodium chloride infusion   IntraVENous PRN    cefTRIAXone (ROCEPHIN) 1,000 mg in sterile water 10 mL IV syringe  1,000 mg IntraVENous Q24H    [Held by provider] apixaban (ELIQUIS) tablet 5 mg  5 mg Oral BID    atorvastatin (LIPITOR) tablet 80 mg  80 mg Oral Daily    [Held by provider] bumetanide (BUMEX) tablet 2 mg  2 mg Oral BID    carvedilol (COREG) tablet 6.25 mg  6.25 mg Oral BID     cyclobenzaprine (FLEXERIL) tablet 5 mg  5 mg Oral TID PRN    folic acid (FOLVITE) tablet 1,000 mcg  1,000 mcg Oral Daily    [Held by provider] insulin glargine (LANTUS) injection vial 30 Units  30 Units SubCUTAneous Nightly    insulin lispro (HUMALOG,ADMELOG) injection vial 0-4 Units  0-4 Units SubCUTAneous 4x Daily AC & HS    glucose chewable tablet 16 g  4 tablet Oral PRN    dextrose bolus 10% 125 mL  125 mL IntraVENous PRN    Or    dextrose bolus 10% 250 mL  250 mL IntraVENous PRN    glucagon emergency SOLR 1 mg  1 mg SubCUTAneous PRN    dextrose 10 % infusion   IntraVENous Continuous PRN    sodium chloride flush 0.9 % injection 5-40 mL  5-40 mL IntraVENous 2 times per day    sodium chloride flush 0.9 % injection 5-40 mL  5-40 mL IntraVENous PRN    0.9 % sodium chloride infusion   IntraVENous PRN    ondansetron (ZOFRAN-ODT) disintegrating tablet 4 mg  4 mg Oral Q8H PRN    Or    ondansetron (ZOFRAN) injection 4 mg  4 mg IntraVENous Q6H PRN    polyethylene glycol (GLYCOLAX) packet 17 g  17 g Oral Daily PRN       Review of Symptoms: comprehensive ROS negative except above.   Objective:   Patient Vitals for the past 24 hrs:   Temp Pulse Resp BP SpO2   11/26/24 1058 97.9 °F (36.6 °C) 76 17 124/60 96 %   11/26/24 0753 97.7 °F (36.5 °C) 76 17 123/60 97 %   11/26/24 0701 -- 76 -- -- --   11/26/24 0400 98.2 °F (36.8 °C) 71 18 103/61 98 %   11/26/24 0000 98.2 °F (36.8 °C) 79 18 112/74 97 %   11/25/24 1957 98.2 °F (36.8 °C) 74 18 (!) 97/55 96 %   11/25/24 1700 -- 72 -- -- --   11/25/24 1515 97.5 °F (36.4 °C) 70 18 (!) 108/57 98 %   11/25/24 1423 -- -- -- (!) 92/51 --   11/25/24 1322 -- 66 -- -- --        Weight change:      11/24 1901 - 11/26 0700  In: 1321.3 [P.O.:960; I.V.:10]  Out: 925 [Urine:925]    Intake/Output Summary (Last 24 hours) at 11/26/2024 1122  Last data filed at 11/26/2024 0534  Gross per 24 hour   Intake 480 ml   Output 925 ml   Net -445 ml     Physical Exam:   General: comfortable, no acute distress

## 2024-11-26 NOTE — CARE COORDINATION
11/26/24 1038   IMM Letter   IMM Letter given to Patient/Family/Significant other/Guardian/POA/by: Guillermo Valerio RN CM, Copy to Patient, Original on Hard Chart.   IMM Letter date given: 11/26/24   IMM Letter time given: 1038

## 2024-11-26 NOTE — PLAN OF CARE
Problem: Chronic Conditions and Co-morbidities  Goal: Patient's chronic conditions and co-morbidity symptoms are monitored and maintained or improved  Description: Monitor vital signs and labs, assist patient with caring for her suprapubic cath as needed, wound care consult placed for chronic wounds  11/26/2024 1042 by Blessing Nguyen RN  Outcome: Adequate for Discharge  Flowsheets (Taken 11/26/2024 0806)  Care Plan - Patient's Chronic Conditions and Co-Morbidity Symptoms are Monitored and Maintained or Improved: Monitor and assess patient's chronic conditions and comorbid symptoms for stability, deterioration, or improvement     Problem: Discharge Planning  Goal: Discharge to home or other facility with appropriate resources  Description: Patient has support at home from daughter and medical equipment for baseline mobility level  11/26/2024 1042 by Blessing Nguyen RN  Outcome: Adequate for Discharge  Flowsheets (Taken 11/26/2024 0806)  Discharge to home or other facility with appropriate resources:   Identify barriers to discharge with patient and caregiver   Arrange for needed discharge resources and transportation as appropriate     Problem: Pain  Goal: Verbalizes/displays adequate comfort level or baseline comfort level  Description: Patient had a slight headache this AM that was improved with Tylenol.  Pt also has cream for hemorrhoids.  Assess pain every 4 hours and as needed and administer pain medications as ordered  11/26/2024 1042 by Blessing Nguyen RN  Outcome: Adequate for Discharge     Problem: Skin/Tissue Integrity  Goal: Absence of new skin breakdown  Description: Monitor for areas of redness and/or skin breakdown. Patient has heel boots on, and has been educated on turning and re postioning every 2 hours to prevent skin breakdown.      11/26/2024 1042 by Blessing Nguyen RN  Outcome: Adequate for Discharge     Problem: Safety - Adult  Goal: Free from fall injury  Description: Fall precautions in place  Outcome:

## 2024-11-26 NOTE — PROGRESS NOTES
Discharge instructions reviewed with pt and all questions answered.  Pt denies CP and SOB, all PIV access removed, telemetry box removed, cleaned and returned. Pt wheeled down in personal wheelchair and departed facility with family in private vehicle.

## 2024-11-26 NOTE — PLAN OF CARE
Problem: Chronic Conditions and Co-morbidities  Goal: Patient's chronic conditions and co-morbidity symptoms are monitored and maintained or improved  Description: Monitor vital signs and labs, assist patient with caring for her suprapubic cath as needed, wound care consult placed for chronic wounds  Outcome: Progressing  Flowsheets (Taken 11/24/2024 2230 by Nilda Avila, RN)  Care Plan - Patient's Chronic Conditions and Co-Morbidity Symptoms are Monitored and Maintained or Improved: Monitor and assess patient's chronic conditions and comorbid symptoms for stability, deterioration, or improvement  Note: Patient's labs were monitored to make sure they were stable through shift      Problem: Discharge Planning  Goal: Discharge to home or other facility with appropriate resources  Description: Patient has support at home from daughter and medical equipment for baseline mobility level  Outcome: Progressing  Flowsheets (Taken 11/26/2024 0759)  Discharge to home or other facility with appropriate resources: Identify barriers to discharge with patient and caregiver  Note: Patient's daughter is involved to help with proper discharge      Problem: Pain  Goal: Verbalizes/displays adequate comfort level or baseline comfort level  Description: Patient had a slight headache this AM that was improved with Tylenol.  Pt also has cream for hemorrhoids.  Assess pain every 4 hours and as needed and administer pain medications as ordered  Outcome: Progressing  Flowsheets (Taken 11/26/2024 2711)  Verbalizes/displays adequate comfort level or baseline comfort level:   Assess pain using appropriate pain scale   Encourage patient to monitor pain and request assistance  Note: Patient complained of muscle spasms and was given meds     Problem: Skin/Tissue Integrity  Goal: Absence of new skin breakdown  Description: Monitor for areas of redness and/or skin breakdown. Patient has heel boots on, and has been educated on turning and re  postioning every 2 hours to prevent skin breakdown.      Outcome: Progressing  Note: Patient has no new skin breakdown      Problem: Hematologic - Adult  Goal: Maintains hematologic stability  Description: Patient received 1 unit PRBCs last night. Hemoglobin improved to 8.5 (6.8,7.6).  Monitor labs and for rectal bleeding  Outcome: Progressing  Note: Patient blood labs and stool monitored for blood through shift      Problem: Cardiovascular - Adult  Goal: Maintains optimal cardiac output and hemodynamic stability  Description: Held Coreg this AM for hypotension.  Monitor BP and intake and output, encourage fluids  Outcome: Progressing  Flowsheets (Taken 11/26/2024 5249)  Maintains optimal cardiac output and hemodynamic stability: Monitor blood pressure and heart rate  Note: Patients vitals remained stable      Problem: Genitourinary - Adult  Goal: Urinary catheter remains patent  Description: Patient has suprapubic cath that in chronic.  Maintain infection control precautions and assist with care as needed  Outcome: Progressing  Note: Patient voided through shift

## 2024-11-26 NOTE — CARE COORDINATION
Met with Patient and Daughter at bedside. CM introduces self and explains role. Patient is alert and oriented x 4. Hipaa verified. Patient agrees to complete initial  assessment.     Patient is medically ready with a discharge order. She comploaints of still being constipated. This was addressed as MD walked into the room as this CM was leaving.     DCP: Home with self- care and supportive family. Patient is established with needed DME. Family to transport patient home.     No further CM needs identified. Will remain avaliable should additional needs arise.      11/26/24 [facility]   Service Assessment   Patient Orientation Alert and Oriented;Person;Place;Situation;Self   Cognition Alert   History Provided By Patient   Primary Caregiver Self   Accompanied By/Relationship Daughter   Support Systems Children;/   Patient's Healthcare Decision Maker is:   (Patient states she has medical documents outlining her health care decisions. though this CM did not find a copy on file. Daughter is requesting a family member to bring in a copy.)   PCP Verified by CM Yes   Last Visit to PCP Within last 3 months   Prior Functional Level Assistance with the following:;Other (see comment);Mobility;Shopping  (Transportation)   Current Functional Level Assistance with the following:;Other (see comment);Mobility;Shopping  (Transportation)   Can patient return to prior living arrangement Yes   Ability to make needs known: Good   Family able to assist with home care needs: Yes   Would you like for me to discuss the discharge plan with any other family members/significant others, and if so, who? Yes  (Patient gives permission for this CM to discuss with her daughter present.)   Financial Resources Medicare   Community Resources None   Social/Functional History   Lives With Other (comment);Family  (Granddaughter and Daughter)   Type of Home House   Home Layout One level   Home Access Stairs to enter with rails;Ramped

## 2024-11-26 NOTE — DISCHARGE SUMMARY
Discharge Summary    Patient: Meggan Rossi MRN: [id number]  CSN: [id number]    YOB: 1950  Age: 74 y.o.  Sex: female    DOA: 11/24/2024 LOS:  LOS: 2 days   Discharge Date:  11/26/2024     Admission Diagnosis: Anemia [D64.9]  Gastrointestinal hemorrhage, unspecified gastrointestinal hemorrhage type [K92.2]  Anemia, unspecified type [D64.9]  Urinary tract infection associated with indwelling urethral catheter, initial encounter ([facility]) [T83.511A, N39.0]  Severe anemia [D64.9]    Discharge Diagnosis:    Symptomatic anemia/Acute on chronic anemia, likely secondary to hemorrhoidal bleeding   ALEXA on CKD stage IV  Non-ischemic cardiomyopathy  Chronic LLE DVT  T2DM with long term use of insulin  complete heart block s/p pacemaker  Paraplegia secondary to psoas abscess/hematoma  Chronic lower extremity wounds   Dyslipidemia   Hx of CVA with right sided deficit   Hx of osteomyelitis due to bilateral heel wounds  Chronic bladder retention/Neurogenic bladder      Discharge Condition: Stable    Discharge Disposition: Home    PHYSICAL EXAM    Visit Vitals  /60   Pulse 70   Temp 97.9 °F (36.6 °C) (Oral)   Resp 17   Ht 1.702 m (5' 7\")   Wt 100 kg (220 lb 8 oz)   SpO2 96%   BMI 34.54 kg/m²       Hospital Course By Problem:   Patient presented to ED on 11/24 for lightheadedness, palpitations and bright red blood secondary to hemorrhoids. In ED found to have Hgb of 6.8 and ALEXA. Patient admitted to hospitalist service and given 1 unit of blood, anusol started for hemorrhoids. Nephrology consulted, recommended holding diuretics and entresto. Discussed case with GI who recommended follow up as outpatient. Hgb stabilized and symptoms resolved. Patient discharged on lactulose for constipation and anusol, given contact info for colorectal surgery and instructions to continue to hold diuretics until follow up with nephrology.     Consults: Nephrology     Significant Diagnostic Studies: Xray Result (most  found. Please discuss with provider.        STOP taking these medications       bumetanide (BUMEX) 2 MG tablet Comments:   Reason for Stopping:         sacubitril-valsartan (ENTRESTO) 24-26 MG per tablet Comments:   Reason for Stopping:         vitamin C (ASCORBIC ACID) 500 MG tablet Comments:   Reason for Stopping:         phenylephrine-mineral oil-petrolatum (PREPARATION H) 0.25-14-74.9 % rectal ointment Comments:   Reason for Stopping:         psyllium husk-aspartame (METAMUCIL FIBER) 51.7 % PACK packet Comments:   Reason for Stopping:         atorvastatin (LIPITOR) 80 MG tablet Comments:   Reason for Stopping:                Activity: activity as tolerated    Functional status and cognitive function:    Wheelchair bound   Status: alert, appears stated age, and cooperative    Diet: diabetic diet    Wound Care: as directed        Follow-up: with PCP, Darci Garcia MD in 7-10days   Follow-up Information     Jamie Khan MD. Call.    Specialty: Colon and Rectal Surgery  Contact information:  [address]  [address]  [place] [address] [phone]             Darci Garcia MD Follow up on 12/5/2024.    Specialty: Internal Medicine  Why: @ 2:30  Contact information:  [address]  [place] [address] [phone]                       Minutes spent on discharge: >30 minutes spent coordinating this discharge (review instructions/follow-up, prescriptions, preparing report for sign off)

## 2024-11-27 DIAGNOSIS — R60.0 EDEMA OF LEFT LOWER EXTREMITY: Primary | ICD-10-CM

## 2024-12-02 NOTE — ROUTINE PROCESS
Verbal and bedside Shift changed report given to Ashley Mendez RN (oncoming RN) on Pt. Condition. Report consisted of patients Situation, History, Activities, intake/output,  Background, Assessment and Recommendations(SBAR). Information from the following report(s) Kardex, order Summary, Lab results and MAR was reviewed with the receiving nurse. Opportunity for questions and clarification was provided. [Normal Appearance] : normal appearance [Well Groomed] : well groomed [General Appearance - In No Acute Distress] : no acute distress [Edema] : no peripheral edema [Respiration, Rhythm And Depth] : normal respiratory rhythm and effort [Exaggerated Use Of Accessory Muscles For Inspiration] : no accessory muscle use [Abdomen Soft] : soft [Abdomen Tenderness] : non-tender [Costovertebral Angle Tenderness] : no ~M costovertebral angle tenderness [Urinary Bladder Findings] : the bladder was normal on palpation [Normal Station and Gait] : the gait and station were normal for the patient's age [] : no rash [No Focal Deficits] : no focal deficits [Oriented To Time, Place, And Person] : oriented to person, place, and time [Affect] : the affect was normal [Mood] : the mood was normal [No Palpable Adenopathy] : no palpable adenopathy

## 2024-12-03 NOTE — PROGRESS NOTES
Physician Progress Note      PATIENT:               REJI CRISOSTOMO  [facility] #:                  [id number]  :                       1950  ADMIT DATE:       2024 2:35 PM  DISCH DATE:        2024 5:13 PM  RESPONDING  PROVIDER #:        Edgar Quigley MD          QUERY TEXT:    Patient admitted with Anemia. Noted documentation of CAUTI in H&P on . In   order to support the diagnosis of UTI, please include additional clinical   indicators in your documentation.  Or please document if the diagnosis of UTI   has been ruled out after further study.    The medical record reflects the following:  Risk Factors: DM, Frequent UTI, now has chronic supra pubic catheter,   Female/Age 74yrs  Clinical Indicators: H&P on -Possible Catheter-associated UTI present   since admission- follow up final culture report; on Rocephin.  Nephrology PN on -Hx of urinary retention, frequent UTI, now has chronic   supra pubic catheter  Urine analysis  nitrite-positive  leukocyte esterase-moderate  blood-negative  ph-5.5  protein-negative  culture->100,000  COLONIES/mL>2 organisms - likely contaminated specimen  Treatment: IV Rocephin, Ur Cx, Serial Labs    Thank you,  Marli Newton, [facility], [facility].  Options provided:  -- UTI present as evidenced by, Please document evidence.  -- UTI was ruled out  -- Other - I will add my own diagnosis  -- Disagree - Not applicable / Not valid  -- Disagree - Clinically unable to determine / Unknown  -- Refer to Clinical Documentation Reviewer    PROVIDER RESPONSE TEXT:    UTI was ruled out after study.    Query created by: Marli Newton on 2024 6:15 AM      Electronically signed by:  Edgar Quigley MD 12/3/2024 5:54 PM

## 2024-12-12 ENCOUNTER — OFFICE VISIT (OUTPATIENT)
Age: 74
End: 2024-12-12

## 2024-12-12 VITALS — BODY MASS INDEX: 34.54 KG/M2 | HEIGHT: 67 IN

## 2024-12-12 DIAGNOSIS — K62.5 RECTAL BLEEDING: ICD-10-CM

## 2024-12-12 DIAGNOSIS — K64.0 GRADE I HEMORRHOIDS: Primary | ICD-10-CM

## 2024-12-12 NOTE — PROGRESS NOTES
mg, Rectal, DAILY PRN    camphor-menthol-methyl salicylate (BENGAY ULTRA STRENGTH) 4-10-30 % CREA cream Apply externally, 3 TIMES DAILY PRN    carvedilol (COREG) 6.25 mg, Oral, 2 TIMES DAILY    collagenase 1 g, Topical, DAILY, Apply topically daily.    cyclobenzaprine (FLEXERIL) 5 mg, Oral, 3 TIMES DAILY PRN    docusate (COLACE, DULCOLAX) 100 mg, Oral, 2 TIMES DAILY    docusate sodium (COLACE) 100 mg, Oral, 2 TIMES DAILY PRN    docusate sodium (COLACE) 100 mg, Oral, 2 TIMES DAILY PRN    docusate sodium (COLACE) 100 mg, Oral, 2 TIMES DAILY    folic acid (FOLVITE) 1,000 mcg, Oral, DAILY    HumaLOG KwikPen 6 Units, SubCUTAneous, 3 TIMES DAILY BEFORE MEALS    hydrocortisone (ANUSOL-HC) 2.5 % CREA rectal cream Spread a pea-sized amount  on rectum twice daily    hydrOXYzine HCl (ATARAX) 25 MG tablet Take 1 tablet by mouth 2 times daily as needed for Itching    insulin glargine (LANTUS) 30 Units, SubCUTAneous, NIGHTLY    Insulin Pen Needle (UNIFINE PENTIPS) 31G X 5 MM MISC 1 each, Does not apply, 4 TIMES DAILY    ketoconazole (NIZORAL) 2 % shampoo Topical, DAILY PRN, Apply topically daily as needed.    lactulose (CHRONULAC) 20 g, Oral, 3 TIMES DAILY PRN    Multiple Vitamin (MULTIVITAMIN) TABS tablet 1 tablet, Oral, DAILY    nystatin (NYAMYC) 013495 UNIT/GM powder Topical, 2 TIMES DAILY, Apply topically 4 times daily.    potassium chloride (KLOR-CON M) 20 MEQ extended release tablet 40 mEq, Oral, DAILY    zinc sulfate (ZINCATE) 50 mg, Oral, DAILY        Allergies   Allergen Reactions    Latex Itching    Oxycodone-Aspirin Anaphylaxis     Other reaction(s): other/intolerance, unknown  Other reaction(s): unknown      Vancomycin Itching    Atorvastatin Myalgia    Blueberry Swelling     Causes throat swelling    Ciprofloxacin Itching and Other (See Comments)     Other Reaction(s): other/intolerance, unknown    Codeine Other (See Comments)     Jumpy feeling    Other Reaction(s): other/intolerance, unknown

## 2025-01-30 ENCOUNTER — TELEPHONE (OUTPATIENT)
Age: 75
End: 2025-01-30

## 2025-01-30 NOTE — TELEPHONE ENCOUNTER
Received a fax from [facility] requesting cardiac clearance for endoscopy. Procedure date 2-. Patient has appointment 2-10. Clearance will be assessed at that time.

## 2025-02-06 ENCOUNTER — OFFICE VISIT (OUTPATIENT)
Age: 75
End: 2025-02-06
Payer: MEDICARE

## 2025-02-06 VITALS
OXYGEN SATURATION: 100 % | TEMPERATURE: 98 F | RESPIRATION RATE: 18 BRPM | DIASTOLIC BLOOD PRESSURE: 66 MMHG | SYSTOLIC BLOOD PRESSURE: 158 MMHG | HEART RATE: 72 BPM

## 2025-02-06 DIAGNOSIS — K64.0 GRADE I HEMORRHOIDS: Primary | ICD-10-CM

## 2025-02-06 DIAGNOSIS — K62.5 RECTAL BLEEDING: ICD-10-CM

## 2025-02-06 PROCEDURE — 99214 OFFICE O/P EST MOD 30 MIN: CPT | Performed by: COLON & RECTAL SURGERY

## 2025-02-06 PROCEDURE — 1124F ACP DISCUSS-NO DSCNMKR DOCD: CPT | Performed by: COLON & RECTAL SURGERY

## 2025-02-06 RX ORDER — TIRZEPATIDE 10 MG/.5ML
10 INJECTION, SOLUTION SUBCUTANEOUS
COMMUNITY

## 2025-02-06 NOTE — PROGRESS NOTES
Chief Complaint   Patient presents with    Follow-up     Hemorrhoids     1. Have you been to the ER, urgent care clinic since your last visit?  Hospitalized since your last visit?No    2. Have you seen or consulted any other health care providers outside of the [facility] since your last visit?  Include any pap smears or colon screening. Yes GI

## 2025-02-06 NOTE — PATIENT INSTRUCTIONS
Pruritis Ani/Anal Irritation    Discontinue use of all wipes     Instead of wipes try Balneol or [employer] brand perianal hygiene lotion to use on toilet paper    Use bland soap such as Dove    Do not over clean area, 1-2 swipes then done    After washing use barrier cream such as Desitin or A & D ointment

## 2025-02-06 NOTE — PROGRESS NOTES
Subjective: Has not had substantial bleeding in the last 2 weeks or so.  Has been maintaining a high-fiber diet.    Past medical history and ROS were reviewed and unchanged.     Exam deferred    Assessment / Plan    Rectal bleeding likely from hemorrhoids  She has been using wipes, go over perianal hygiene and I think this may be causing significant symptoms to her hemorrhoids externally  She has had substantial bleeding in the past likely from her internal hemorrhoids  She is to have colonoscopy and EGD by GI  If these are normal and her external symptoms subside with proper perianal hygiene can consider injection sclerotherapy given her underlying need for anticoagulation  Otherwise we can discuss hemorrhoidectomy again  She will follow-up in the office after endoscopic evaluations    30 minutes was spent in patient care.      The diagnoses and plan were discussed with patient.  All questions answered.  Plan of care agreed to by all concerned.

## 2025-02-10 ENCOUNTER — OFFICE VISIT (OUTPATIENT)
Age: 75
End: 2025-02-10
Payer: MEDICARE

## 2025-02-10 VITALS
OXYGEN SATURATION: 99 % | DIASTOLIC BLOOD PRESSURE: 64 MMHG | HEART RATE: 76 BPM | BODY MASS INDEX: 34.84 KG/M2 | WEIGHT: 222 LBS | HEIGHT: 67 IN | SYSTOLIC BLOOD PRESSURE: 142 MMHG

## 2025-02-10 DIAGNOSIS — D63.8 ANEMIA OF CHRONIC DISEASE: Primary | ICD-10-CM

## 2025-02-10 DIAGNOSIS — E11.22 TYPE 2 DIABETES MELLITUS WITH STAGE 4 CHRONIC KIDNEY DISEASE, WITH LONG-TERM CURRENT USE OF INSULIN (HCC): Chronic | ICD-10-CM

## 2025-02-10 DIAGNOSIS — I42.8 NONISCHEMIC CARDIOMYOPATHY (HCC): ICD-10-CM

## 2025-02-10 DIAGNOSIS — Z95.810 BIVENTRICULAR IMPLANTABLE CARDIOVERTER-DEFIBRILLATOR IN SITU: ICD-10-CM

## 2025-02-10 DIAGNOSIS — N18.4 TYPE 2 DIABETES MELLITUS WITH STAGE 4 CHRONIC KIDNEY DISEASE, WITH LONG-TERM CURRENT USE OF INSULIN (HCC): Chronic | ICD-10-CM

## 2025-02-10 DIAGNOSIS — G82.20 PARAPLEGIA (HCC): ICD-10-CM

## 2025-02-10 DIAGNOSIS — Z99.3 WHEELCHAIR DEPENDENCE: ICD-10-CM

## 2025-02-10 DIAGNOSIS — I10 ESSENTIAL (PRIMARY) HYPERTENSION: ICD-10-CM

## 2025-02-10 DIAGNOSIS — E78.5 DYSLIPIDEMIA: ICD-10-CM

## 2025-02-10 DIAGNOSIS — E66.811 OBESITY (BMI 30.0-34.9): ICD-10-CM

## 2025-02-10 DIAGNOSIS — Z79.4 TYPE 2 DIABETES MELLITUS WITH STAGE 4 CHRONIC KIDNEY DISEASE, WITH LONG-TERM CURRENT USE OF INSULIN (HCC): Chronic | ICD-10-CM

## 2025-02-10 DIAGNOSIS — I50.22 CHRONIC SYSTOLIC CONGESTIVE HEART FAILURE (HCC): ICD-10-CM

## 2025-02-10 DIAGNOSIS — G47.33 OBSTRUCTIVE SLEEP APNEA ON CPAP: ICD-10-CM

## 2025-02-10 DIAGNOSIS — E11.42 POLYNEUROPATHY DUE TO TYPE 2 DIABETES MELLITUS (HCC): ICD-10-CM

## 2025-02-10 PROCEDURE — 3077F SYST BP >= 140 MM HG: CPT | Performed by: INTERNAL MEDICINE

## 2025-02-10 PROCEDURE — 3078F DIAST BP <80 MM HG: CPT | Performed by: INTERNAL MEDICINE

## 2025-02-10 PROCEDURE — 1124F ACP DISCUSS-NO DSCNMKR DOCD: CPT | Performed by: INTERNAL MEDICINE

## 2025-02-10 PROCEDURE — 93000 ELECTROCARDIOGRAM COMPLETE: CPT | Performed by: INTERNAL MEDICINE

## 2025-02-10 PROCEDURE — 99215 OFFICE O/P EST HI 40 MIN: CPT | Performed by: INTERNAL MEDICINE

## 2025-02-10 ASSESSMENT — PATIENT HEALTH QUESTIONNAIRE - PHQ9
SUM OF ALL RESPONSES TO PHQ QUESTIONS 1-9: 0
SUM OF ALL RESPONSES TO PHQ QUESTIONS 1-9: 0
2. FEELING DOWN, DEPRESSED OR HOPELESS: NOT AT ALL
1. LITTLE INTEREST OR PLEASURE IN DOING THINGS: NOT AT ALL
SUM OF ALL RESPONSES TO PHQ QUESTIONS 1-9: 0
SUM OF ALL RESPONSES TO PHQ9 QUESTIONS 1 & 2: 0
SUM OF ALL RESPONSES TO PHQ QUESTIONS 1-9: 0

## 2025-02-10 NOTE — PROGRESS NOTES
02/11/25     Chief Complaint   Patient presents with    Follow-up       HPI:   Meggan Rossi is a 74 y.o. Black /  female regarding her dilated nonischemic cardiomyopathy. She has history of a dilated cardiomyopathy with mild left ventricular dysfunction and an ejection fraction in the 40 to 45% range with widely patent coronary arteries and a cardiac catheterization back in August 1996. An echocardiogram completed on May 5, 2017 suggested an ejection fraction of 50%, but more recently her ejection fraction was down to 35 to 40% and an echo of July 2024.  She did have a biventricular AICD placed but due to trauma and infection in that area she had that removed and now she has a biventricular pacemaker with pulse generator residing in her left upper quadrant placed back in October 2012.     Unfortunately, she fell at home and traumatized her back developing an infection of her right Psoas muscle hematoma with development of epidural abscess with neurologic compromise resulting in paraplegia back in April 2017.     She had her pacemaker generator changed by Dr. Jang back on February 26, 2019.  It should be noted that she had an infected right foot and was on antibiotics for for some time before the generator change and she is now off antibiotics and foot is now healing by secondary intention and apparently is doing well according to the patient.      Over the years her heart function has decreased and she had ejection fractions as low as 25 to 30% which improved on Entresto therapy, but that medication had to be completely discontinued due to worsening renal failure with her last BUN and creatinine being 64 and 3.20 when completed on November 24, 2024.  She also tells me that her blood pressure had been dropping and she thought that was the reason that the Entresto was stopped, but more recently her blood pressure has been going up and she has been in the 150 systolic range which is slightly above

## 2025-02-10 NOTE — PROGRESS NOTES
Meggan Rossi presents today for   Chief Complaint   Patient presents with    Follow-up       Meggan Rossi preferred language for health care discussion is english/other.    Is someone accompanying this pt? no    Is the patient using any DME equipment during OV? no    Depression Screening:  Depression: Not at risk (10/31/2024)    PHQ-2     PHQ-2 Score: 0        Learning Assessment:  Who is the primary learner? Patient    What is the preferred language for health care of the primary learner? ENGLISH    How does the primary learner prefer to learn new concepts? DEMONSTRATION    Answered By patient    Relationship to Learner SELF           Pt currently taking Anticoagulant therapy? eliquis    Pt currently taking Antiplatelet therapy ? no      Coordination of Care:  1. Have you been to the ER, urgent care clinic since your last visit? Hospitalized since your last visit? no    2. Have you seen or consulted any other health care providers outside of the [facility] since your last visit? Include any pap smears or colon screening. no

## 2025-02-11 ENCOUNTER — HOSPITAL ENCOUNTER (OUTPATIENT)
Facility: HOSPITAL | Age: 75
Setting detail: SPECIMEN
Discharge: HOME | End: 2025-02-14

## 2025-02-11 DIAGNOSIS — D63.8 ANEMIA OF CHRONIC DISEASE: ICD-10-CM

## 2025-02-11 LAB — LABCORP SPECIMEN COLLECTION: NORMAL

## 2025-02-11 PROCEDURE — 99001 SPECIMEN HANDLING PT-LAB: CPT

## 2025-02-12 ENCOUNTER — TELEPHONE (OUTPATIENT)
Age: 75
End: 2025-02-12

## 2025-02-12 LAB
BASOPHILS # BLD AUTO: 0 X10E3/UL (ref 0–0.2)
BASOPHILS NFR BLD AUTO: 1 %
BUN SERPL-MCNC: 26 MG/DL (ref 8–27)
BUN/CREAT SERPL: 18 (ref 12–28)
CALCIUM SERPL-MCNC: 9.2 MG/DL (ref 8.7–10.3)
CHLORIDE SERPL-SCNC: 102 MMOL/L (ref 96–106)
CO2 SERPL-SCNC: 24 MMOL/L (ref 20–29)
CREAT SERPL-MCNC: 1.41 MG/DL (ref 0.57–1)
EGFRCR SERPLBLD CKD-EPI 2021: 39 ML/MIN/1.73
EOSINOPHIL # BLD AUTO: 0.2 X10E3/UL (ref 0–0.4)
EOSINOPHIL NFR BLD AUTO: 4 %
ERYTHROCYTE [DISTWIDTH] IN BLOOD BY AUTOMATED COUNT: 18.1 % (ref 11.7–15.4)
GLUCOSE SERPL-MCNC: 113 MG/DL (ref 70–99)
HCT VFR BLD AUTO: 29 % (ref 34–46.6)
HGB BLD-MCNC: 9.3 G/DL (ref 11.1–15.9)
IMM GRANULOCYTES # BLD AUTO: 0 X10E3/UL (ref 0–0.1)
IMM GRANULOCYTES NFR BLD AUTO: 0 %
LYMPHOCYTES # BLD AUTO: 1.7 X10E3/UL (ref 0.7–3.1)
LYMPHOCYTES NFR BLD AUTO: 34 %
MCH RBC QN AUTO: 25.1 PG (ref 26.6–33)
MCHC RBC AUTO-ENTMCNC: 32.1 G/DL (ref 31.5–35.7)
MCV RBC AUTO: 78 FL (ref 79–97)
MONOCYTES # BLD AUTO: 0.4 X10E3/UL (ref 0.1–0.9)
MONOCYTES NFR BLD AUTO: 9 %
NEUTROPHILS # BLD AUTO: 2.5 X10E3/UL (ref 1.4–7)
NEUTROPHILS NFR BLD AUTO: 52 %
NT-PROBNP SERPL-MCNC: 1506 PG/ML (ref 0–301)
PLATELET # BLD AUTO: 231 X10E3/UL (ref 150–450)
POTASSIUM SERPL-SCNC: 4 MMOL/L (ref 3.5–5.2)
RBC # BLD AUTO: 3.7 X10E6/UL (ref 3.77–5.28)
SODIUM SERPL-SCNC: 139 MMOL/L (ref 134–144)
WBC # BLD AUTO: 4.8 X10E3/UL (ref 3.4–10.8)

## 2025-02-13 NOTE — TELEPHONE ENCOUNTER
----- Message from ELSIE PALM RN sent at 2/12/2025  2:54 PM EST -----  Per your last office note:     This lady has multiple comorbidities and it is extremely difficult to assess her cardiac risk at the time of any noncardiac surgery or invasive intervention such as colonoscopy and endoscopy.  However, due to her multiple comorbidities which include paroxysmal atrial fibrillation, chronic systolic heart failure, and end-stage renal disease I believe her risk has to be considered to be high with any intervention.  Currently she can lay semirecumbent without shortness of breath despite her bibasilar rales and it is really hard to assess how she will tolerate her colonoscopy however she is always semirecumbent in leads currently appears to be tolerating that position well without shortness of breath.  I am going to go ahead and get another BMP to document her renal function and am going to get a BNP as well since if that is in the normal range which it was when it was last obtained on November 24, 2024 we could at least say that she appears to be adequately compensated from a heart failure vantage with her aggressive diuretic therapy despite her renal insufficiency.     Her biventricular pacer has been functioning well, but as noted in the EKG review above she did have a number of paroxysmal episodes of atrial fibrillation and currently is still taking Eliquis despite her rather significant anemia.  I do not see that she has had a CBC on the chart since back in November and certainly she needs to have that completed prior to any procedure.     Her blood pressure seems to be somewhat suboptimally controlled again and her Entresto was discontinued either due to low blood pressure which is what she remembers or possibly worsening renal failure which apparently has improved.  If her repeat BMP shows that she is still at the stage IV level of chronic kidney failure I would like to discuss with nephrology whether we could 
Called patient regarding this. She requested a refill of entresto. Refill request sent to provider.   
I called this lady about her CBC and her BMP.  Her BMP is really gotten much better and her BUN and creatinine are down to 26 and 1.41 and her H&H has improved as well up to 9.3 and 29.  Consequently, I told her she could now go back on Entresto and since her blood pressures are running in the 120 240 systolic range she can take the 49/51 twice daily if she has that dosage.  I told her if she only had to 24/26 dosage that she could start with that twice a day for now.  Her BNP is still pending.    Amador Barger JR, DO    
consider restarting her low-dose Entresto 24-26 milligrams twice daily for blood pressure control if that is not possible I would have to consider possibly adding nitrates and hydralazine.     As indicated above I would assess her risk of any procedure to be high cardiovascular vantage, but that being said if it is felt that this risk is worth taking I would consider discontinuing Eliquis for 48 hours prior to the procedure and restarting it as soon as possible after the procedure.

## 2025-02-19 RX ORDER — BUMETANIDE 0.5 MG/1
0.5 TABLET ORAL DAILY
COMMUNITY
Start: 2025-02-07 | End: 2026-02-07

## 2025-02-19 NOTE — PROGRESS NOTES
Instructions for your procedure at [facility]      Today's Date: 2/19/2025      Patient's Name: Meggan Rossi      Procedure Date: 02/27/2025        Please enter the main entrance of the hospital and check-in at the  located in the lobby.      Do NOT eat or drink anything, including candy, gum, or ice chips after midnight prior to your procedure, unless it is part of your prep.  Brush your teeth before coming to the hospital.You may swish with water, but do not swallow.  No smoking/Vaping/E-Cigarettes 24 hours prior to the day of procedure.  No alcohol 24 hours prior to the day of procedure.  No recreational drugs for one week prior to the day of procedure.  Bring Photo ID, Insurance information, and Co-pay if required on day of procedure.  Bring in pertinent legal documents, such as, Medical Power of , DNR, Advance Directive, etc.  Leave all other valuables, including money/purse, weapons at home.  Remove jewelry, including ALL body piercings, nail polish, acrylic nails, and makeup (including mascara); no lotions, powders, deodorant, and/or perfume/cologne/after shave on the skin.  Glasses and dentures may be worn to the hospital.  They must be removed prior to procedure. Please bring case/container for glasses or dentures.  11. Contacts should not be worn on day of procedure.   12. Call the office ([phone]) if you have symptoms of a cold or illness within 24-48 hours prior to your procedure.   13. AN ADULT (relative or friend 18 years or older) MUST DRIVE YOU HOME AFTER YOUR PROCEDURE.   14. Please make arrangements for a responsible adult (18 years or older) to be with you for 24 hours after your procedure.   15. TWO VISITORS will be allowed in the waiting area during your procedure.       Special Instructions:      Bring list of CURRENT medications.  Follow instructions from the office regarding Bowel Prep, Vitamins, Iron, Blood Thinners, Insulin,  and Blood  Pressure/Heart medications.  Bring inhaler.      Your Pacemaker/AICD needs to be interrogated within 6 months of your procedure. If not interrogated within this timeframe, your procedure will be canceled.    If you have a history of recreational drug use, you may be required to submit a urine sample for drug testing the day of your procedure, as some recreational drugs can interact with anesthetics and increase your surgical risk.    Any questions regarding prep, please call the office at [phone].    For any questions or concerns on the day of procedure, please call the Endo Suite at [phone].    These surgical instructions were reviewed with Meggan Rossi during the PAT phone call.

## 2025-02-21 ENCOUNTER — TELEPHONE (OUTPATIENT)
Age: 75
End: 2025-02-21

## 2025-02-21 DIAGNOSIS — I50.9 CHRONIC CONGESTIVE HEART FAILURE, UNSPECIFIED HEART FAILURE TYPE (HCC): Primary | ICD-10-CM

## 2025-02-21 NOTE — TELEPHONE ENCOUNTER
----- Message from ELSIE PALM RN sent at 2/14/2025  3:28 PM EST -----  Per your last office note:      This lady has multiple comorbidities and it is extremely difficult to assess her cardiac risk at the time of any noncardiac surgery or invasive intervention such as colonoscopy and endoscopy.  However, due to her multiple comorbidities which include paroxysmal atrial fibrillation, chronic systolic heart failure, and end-stage renal disease I believe her risk has to be considered to be high with any intervention.  Currently she can lay semirecumbent without shortness of breath despite her bibasilar rales and it is really hard to assess how she will tolerate her colonoscopy however she is always semirecumbent in leads currently appears to be tolerating that position well without shortness of breath.  I am going to go ahead and get another BMP to document her renal function and am going to get a BNP as well since if that is in the normal range which it was when it was last obtained on November 24, 2024 we could at least say that she appears to be adequately compensated from a heart failure vantage with her aggressive diuretic therapy despite her renal insufficiency.     Her biventricular pacer has been functioning well, but as noted in the EKG review above she did have a number of paroxysmal episodes of atrial fibrillation and currently is still taking Eliquis despite her rather significant anemia.  I do not see that she has had a CBC on the chart since back in November and certainly she needs to have that completed prior to any procedure.     Her blood pressure seems to be somewhat suboptimally controlled again and her Entresto was discontinued either due to low blood pressure which is what she remembers or possibly worsening renal failure which apparently has improved.  If her repeat BMP shows that she is still at the stage IV level of chronic kidney failure I would like to discuss with nephrology whether we

## 2025-02-21 NOTE — TELEPHONE ENCOUNTER
I called and talked to this lady about her NT proBNP.  Her NT proBNP was 1506 which would be considered elevated although she will be 75 in a few months and at that time normal goes up to 1800 so it is difficult to know if she is really in overt failure.  She is not having any real shortness of breath and she only sleeps with her head mildly elevated.  That NT proBNP was actually done before we restarted her Entresto 24/26 twice daily.  I was going to increase it to 49/51, but that did not happen because I guess it was not ready at the pharmacy.  I told her to go ahead and double up on her 24/26 twice daily medications since 49/51 is very similar in terms of the total milligrams, so she is going to be taking 2 of the 2426 twice a day.      Her blood pressure is running 120/70 this morning, so I think she should be able to tolerate the increased dose.  We probably should try to check her BMP and BNP 1 more time may be on Tuesday of next week.  I actually did not mention that to her, but I did put the order in.  She is to have a colonoscopy on this coming Thursday 2/27/2025 and I still called it high risk, but I think she probably can tolerate it okay.  She also asked about whether her pacemaker needed to be checked and she said that she was going to be in our area on Tuesday the 25th, so if we do need to check it please give her a call on Monday and also let her know about the blood work.      I would also like to send the dosage for her Entresto 49/51 twice daily to her pharmacy so when she gets it refilled she will be on the higher dose which I think is what she is going to need to be on long term. This of course assumes that her BMP has not significantly worsened with the reinitiation of Entresto.    Amador Barger JR, DO

## 2025-02-24 NOTE — TELEPHONE ENCOUNTER
Called patient regarding this.    She stated that she picked up Entresto 49/51 from her pharmacy yesterday. She was made aware of the lab orders and stated that she would try to get them done tomorrow. Also made patient aware of her carelink scheduled for 3/5. Clearance letter sent to Dr. Khan.

## 2025-02-25 ENCOUNTER — HOSPITAL ENCOUNTER (OUTPATIENT)
Facility: HOSPITAL | Age: 75
Setting detail: SPECIMEN
Discharge: HOME | End: 2025-02-28

## 2025-02-25 LAB — LABCORP SPECIMEN COLLECTION: NORMAL

## 2025-02-25 PROCEDURE — 99001 SPECIMEN HANDLING PT-LAB: CPT

## 2025-02-26 ENCOUNTER — ANESTHESIA EVENT (OUTPATIENT)
Facility: HOSPITAL | Age: 75
End: 2025-02-26
Payer: MEDICARE

## 2025-02-26 LAB
BNP SERPL-MCNC: 139.2 PG/ML (ref 0–100)
BUN SERPL-MCNC: 36 MG/DL (ref 8–27)
BUN/CREAT SERPL: 22 (ref 12–28)
CHLORIDE SERPL-SCNC: 104 MMOL/L (ref 96–106)
CO2 SERPL-SCNC: 21 MMOL/L (ref 20–29)
CREAT SERPL-MCNC: 1.61 MG/DL (ref 0.57–1)
EGFRCR SERPLBLD CKD-EPI 2021: 33 ML/MIN/1.73
GLUCOSE SERPL-MCNC: 153 MG/DL (ref 70–99)
POTASSIUM SERPL-SCNC: 4.8 MMOL/L (ref 3.5–5.2)
SODIUM SERPL-SCNC: 138 MMOL/L (ref 134–144)
SPECIMEN STATUS REPORT: NORMAL

## 2025-02-27 ENCOUNTER — ANESTHESIA (OUTPATIENT)
Facility: HOSPITAL | Age: 75
End: 2025-02-27
Payer: MEDICARE

## 2025-02-27 ENCOUNTER — HOSPITAL ENCOUNTER (OUTPATIENT)
Facility: HOSPITAL | Age: 75
Setting detail: OUTPATIENT SURGERY
Discharge: HOME | End: 2025-02-27
Attending: STUDENT IN AN ORGANIZED HEALTH CARE EDUCATION/TRAINING PROGRAM | Admitting: STUDENT IN AN ORGANIZED HEALTH CARE EDUCATION/TRAINING PROGRAM
Payer: MEDICARE

## 2025-02-27 VITALS
SYSTOLIC BLOOD PRESSURE: 120 MMHG | TEMPERATURE: 97.4 F | DIASTOLIC BLOOD PRESSURE: 71 MMHG | BODY MASS INDEX: 34.77 KG/M2 | WEIGHT: 222 LBS | OXYGEN SATURATION: 100 % | RESPIRATION RATE: 18 BRPM | HEART RATE: 77 BPM

## 2025-02-27 LAB
GLUCOSE BLD STRIP.AUTO-MCNC: 219 MG/DL (ref 70–110)
GLUCOSE BLD STRIP.AUTO-MCNC: 248 MG/DL (ref 70–110)

## 2025-02-27 PROCEDURE — 2709999900 HC NON-CHARGEABLE SUPPLY: Performed by: STUDENT IN AN ORGANIZED HEALTH CARE EDUCATION/TRAINING PROGRAM

## 2025-02-27 PROCEDURE — 3600007513: Performed by: STUDENT IN AN ORGANIZED HEALTH CARE EDUCATION/TRAINING PROGRAM

## 2025-02-27 PROCEDURE — 7100000000 HC PACU RECOVERY - FIRST 15 MIN: Performed by: STUDENT IN AN ORGANIZED HEALTH CARE EDUCATION/TRAINING PROGRAM

## 2025-02-27 PROCEDURE — 82962 GLUCOSE BLOOD TEST: CPT

## 2025-02-27 PROCEDURE — 6370000000 HC RX 637 (ALT 250 FOR IP): Performed by: NURSE ANESTHETIST, CERTIFIED REGISTERED

## 2025-02-27 PROCEDURE — 2580000003 HC RX 258: Performed by: NURSE ANESTHETIST, CERTIFIED REGISTERED

## 2025-02-27 PROCEDURE — 88305 TISSUE EXAM BY PATHOLOGIST: CPT

## 2025-02-27 PROCEDURE — 3700000001 HC ADD 15 MINUTES (ANESTHESIA): Performed by: STUDENT IN AN ORGANIZED HEALTH CARE EDUCATION/TRAINING PROGRAM

## 2025-02-27 PROCEDURE — 7100000010 HC PHASE II RECOVERY - FIRST 15 MIN: Performed by: STUDENT IN AN ORGANIZED HEALTH CARE EDUCATION/TRAINING PROGRAM

## 2025-02-27 PROCEDURE — 6360000002 HC RX W HCPCS: Performed by: NURSE ANESTHETIST, CERTIFIED REGISTERED

## 2025-02-27 PROCEDURE — 3600007503: Performed by: STUDENT IN AN ORGANIZED HEALTH CARE EDUCATION/TRAINING PROGRAM

## 2025-02-27 PROCEDURE — 3700000000 HC ANESTHESIA ATTENDED CARE: Performed by: STUDENT IN AN ORGANIZED HEALTH CARE EDUCATION/TRAINING PROGRAM

## 2025-02-27 RX ORDER — SODIUM CHLORIDE 0.9 % (FLUSH) 0.9 %
5-40 SYRINGE (ML) INJECTION EVERY 12 HOURS SCHEDULED
Status: DISCONTINUED | OUTPATIENT
Start: 2025-02-27 | End: 2025-02-27 | Stop reason: HOSPADM

## 2025-02-27 RX ORDER — SODIUM CHLORIDE 9 MG/ML
INJECTION, SOLUTION INTRAVENOUS PRN
Status: DISCONTINUED | OUTPATIENT
Start: 2025-02-27 | End: 2025-02-27 | Stop reason: HOSPADM

## 2025-02-27 RX ORDER — SODIUM CHLORIDE, SODIUM LACTATE, POTASSIUM CHLORIDE, CALCIUM CHLORIDE 600; 310; 30; 20 MG/100ML; MG/100ML; MG/100ML; MG/100ML
INJECTION, SOLUTION INTRAVENOUS CONTINUOUS
Status: DISCONTINUED | OUTPATIENT
Start: 2025-02-27 | End: 2025-02-27 | Stop reason: HOSPADM

## 2025-02-27 RX ORDER — LIDOCAINE HYDROCHLORIDE 20 MG/ML
INJECTION, SOLUTION EPIDURAL; INFILTRATION; INTRACAUDAL; PERINEURAL
Status: DISCONTINUED | OUTPATIENT
Start: 2025-02-27 | End: 2025-02-27 | Stop reason: SDUPTHER

## 2025-02-27 RX ORDER — INSULIN LISPRO 100 [IU]/ML
0-12 INJECTION, SOLUTION INTRAVENOUS; SUBCUTANEOUS ONCE
Status: COMPLETED | OUTPATIENT
Start: 2025-02-27 | End: 2025-02-27

## 2025-02-27 RX ORDER — SODIUM CHLORIDE 0.9 % (FLUSH) 0.9 %
5-40 SYRINGE (ML) INJECTION PRN
Status: DISCONTINUED | OUTPATIENT
Start: 2025-02-27 | End: 2025-02-27 | Stop reason: HOSPADM

## 2025-02-27 RX ORDER — SODIUM CHLORIDE, SODIUM LACTATE, POTASSIUM CHLORIDE, CALCIUM CHLORIDE 600; 310; 30; 20 MG/100ML; MG/100ML; MG/100ML; MG/100ML
INJECTION, SOLUTION INTRAVENOUS
Status: DISCONTINUED | OUTPATIENT
Start: 2025-02-27 | End: 2025-02-27 | Stop reason: SDUPTHER

## 2025-02-27 RX ORDER — DEXTROSE MONOHYDRATE 100 MG/ML
INJECTION, SOLUTION INTRAVENOUS CONTINUOUS PRN
Status: DISCONTINUED | OUTPATIENT
Start: 2025-02-27 | End: 2025-02-27 | Stop reason: HOSPADM

## 2025-02-27 RX ADMIN — PROPOFOL 20 MG: 10 INJECTION, EMULSION INTRAVENOUS at 07:36

## 2025-02-27 RX ADMIN — PROPOFOL 10 MG: 10 INJECTION, EMULSION INTRAVENOUS at 07:38

## 2025-02-27 RX ADMIN — LIDOCAINE HYDROCHLORIDE 80 MG: 20 SOLUTION INTRAVENOUS at 07:34

## 2025-02-27 RX ADMIN — PROPOFOL 30 MG: 10 INJECTION, EMULSION INTRAVENOUS at 07:34

## 2025-02-27 RX ADMIN — PROPOFOL 10 MG: 10 INJECTION, EMULSION INTRAVENOUS at 07:44

## 2025-02-27 RX ADMIN — PROPOFOL 20 MG: 10 INJECTION, EMULSION INTRAVENOUS at 07:49

## 2025-02-27 RX ADMIN — INSULIN LISPRO 6 UNITS: 100 INJECTION, SOLUTION INTRAVENOUS; SUBCUTANEOUS at 07:14

## 2025-02-27 RX ADMIN — SODIUM CHLORIDE, SODIUM LACTATE, POTASSIUM CHLORIDE, AND CALCIUM CHLORIDE: 600; 310; 30; 20 INJECTION, SOLUTION INTRAVENOUS at 07:09

## 2025-02-27 RX ADMIN — PROPOFOL 10 MG: 10 INJECTION, EMULSION INTRAVENOUS at 07:41

## 2025-02-27 RX ADMIN — PROPOFOL 10 MG: 10 INJECTION, EMULSION INTRAVENOUS at 07:47

## 2025-02-27 RX ADMIN — SODIUM CHLORIDE, SODIUM LACTATE, POTASSIUM CHLORIDE, AND CALCIUM CHLORIDE: 600; 310; 30; 20 INJECTION, SOLUTION INTRAVENOUS at 07:26

## 2025-02-27 ASSESSMENT — PAIN - FUNCTIONAL ASSESSMENT
PAIN_FUNCTIONAL_ASSESSMENT: NONE - DENIES PAIN

## 2025-02-27 NOTE — H&P
[email or web address]  [phone]    Chief Complaint: Melena and hematochezia    PMH:   Past Medical History:   Diagnosis Date    Abnormal nuclear cardiac imaging test 06/19/2015    Fixed distal apical, distal septal defect more likely due to RV pacing than prior infarct.  No ischemia.  EF 46%.  RWMA c/w RV pacing.  Nondiagnostic EKG on pharm stress test.      Acute paraplegia ([facility]) 4/20/2017    Anticoagulant long-term use     Benign hypertensive heart disease with systolic CHF, NYHA class 2 ([facility]) 9/5/2012    Biventricular implantable cardioverter-defibrillator in situ 04/28/2005    Upgraded to BiV AICD; gen change 4/2008; pocket revision 10/2009; Abdominal - done on 8/22/2012 by Dr. Ashok Myers     CAD (coronary artery disease)     Chronic anemia 9/5/2012    Chronic kidney disease     Chronic systolic heart failure ([facility])     Congenital heart disease     Decreased calculated glomerular filtration rate (GFR) 3/30/2017    Calculated GFR equivalent to that of CKD stage 3 = 30-59 ml/min    Diabetic neuropathy associated with type 2 diabetes mellitus ([facility]) 6/28/2011    Difficult airway for intubation 08/22/2012    see anesthesia airway note    DVT (deep venous thrombosis) ([facility]) 08/27/2012    DVT in axillary vein on left.  Left subclavian was not visualized.    DVT (deep venous thrombosis) ([facility]) 09/04/2012    Acute, non-occlusive DVT in CFV on right.  No DVT on left.  No superficial thrombosis bilaterally.    Dyslipidemia 6/28/2011    GERD (gastroesophageal reflux disease)     Gout     History of blood transfusion     History of complete heart block 06/28/2011    History of Coumadin therapy     Anticoagulation for DVT of the LUE; Discontinued on 3/30/2017    History of deep venous thrombosis 9/5/2012    Left upper extremity    History of echocardiogram 06/23/2015    Ltd study.  EF 45-50%.  Mild, diffuse hypk.  Severe apical hypk.  No mass or thrombus was clearly identified, although imaging was suboptimal.      History

## 2025-02-27 NOTE — ANESTHESIA POSTPROCEDURE EVALUATION
Department of Anesthesiology  Postprocedure Note    Patient: Meggan Rossi  MRN: [id number]  YOB: 1950  Date of evaluation: 2/27/2025    Procedure Summary       Date: 02/27/25 Room / Location: [facility] ENDO 02 / [facility] ENDOSCOPY    Anesthesia Start: 0726 Anesthesia Stop: 0800    Procedures:       ESOPHAGOGASTRODUODENOSCOPY with cold forcep bx's (Upper GI Region)      COLONOSCOPY DIAGNOSTIC (Abdomen) Diagnosis:       Gastrointestinal hemorrhage, unspecified gastrointestinal hemorrhage type      History of gastric ulcer      Pain, abdominal, LUQ      Dark stools      Abdominal bloating      Iron deficiency anemia, unspecified iron deficiency anemia type      Constipation, unspecified constipation type      Hemorrhoids, unspecified hemorrhoid type      Rectal bleeding      BMI 34.0-34.9,adult      (Gastrointestinal hemorrhage, unspecified gastrointestinal hemorrhage type [K92.2])      (History of gastric ulcer [Z87.11])      (Pain, abdominal, LUQ [R10.12])      (Dark stools [R19.5])      (Abdominal bloating [R14.0])      (Iron deficiency anemia, unspecified iron deficiency anemia type [D50.9])      (Constipation, unspecified constipation type [K59.00])      (Hemorrhoids, unspecified hemorrhoid type [K64.9])      (Rectal bleeding [K62.5])      (BMI 34.0-34.9,adult [Z68.34])    Surgeons: Jonh Peters MD Responsible Provider: Tavo Brown MD    Anesthesia Type: General ASA Status: 4            Anesthesia Type: General    Reynaldo Phase I: Reynaldo Score: 10    Reynaldo Phase II: Reynaldo Score: 10    Anesthesia Post Evaluation    Patient location during evaluation: bedside  Patient participation: complete - patient participated  Airway patency: patent  Cardiovascular status: hemodynamically stable  Respiratory status: acceptable  Hydration status: stable    No notable events documented.   Temporal thermometer continues to read T:90. Rectal T: 91.1  [facility] resident notified via secure message. Will continue to monitor.

## 2025-02-27 NOTE — ANESTHESIA PRE PROCEDURE
(formerly [facility]) 6/28/2011   • Difficult airway for intubation 08/22/2012    see anesthesia airway note   • DVT (deep venous thrombosis) (formerly [facility]) 08/27/2012    DVT in axillary vein on left.  Left subclavian was not visualized.   • DVT (deep venous thrombosis) (formerly [facility]) 09/04/2012    Acute, non-occlusive DVT in CFV on right.  No DVT on left.  No superficial thrombosis bilaterally.   • Dyslipidemia 6/28/2011   • GERD (gastroesophageal reflux disease)    • Gout    • History of blood transfusion    • History of complete heart block 06/28/2011   • History of Coumadin therapy     Anticoagulation for DVT of the LUE; Discontinued on 3/30/2017   • History of deep venous thrombosis 9/5/2012    Left upper extremity   • History of echocardiogram 06/23/2015    Ltd study.  EF 45-50%.  Mild, diffuse hypk.  Severe apical hypk.  No mass or thrombus was clearly identified, although imaging was suboptimal.     • History of pyelonephritis 3/30/2017   • Hx of blood clots    • Hypertension    • Left bundle branch block (LBBB) on electrocardiogram 06/28/2011   • Neuropathy    • Nonischemic cardiomyopathy (formerly [facility]) 6/28/2011   • Obesity (BMI 35.0-39.9 without comorbidity) 3/13/2017   • Obstructive sleep apnea on CPAP 2/7/2012   • Psoas abscess, right (formerly [facility]) 4/20/2017   • Psoas hematoma, right, secondary to anticoagulant therapy 3/30/2017   • S/P cardiac cath 08/15/1996    Patent coronaries.  Elev LVEDP.  EF 50-55%.     • Type 2 diabetes mellitus with diabetic neuropathy (formerly [facility]) 6/28/2011   • Wheelchair dependent        Past Surgical History:        Procedure Laterality Date   • CARDIAC SURGERY     • CARPAL TUNNEL RELEASE  4/07    right    • CHOLECYSTECTOMY  1994   • COLONOSCOPY N/A 10/5/2021    COLONOSCOPY performed by Sachin Pettit MD at [facility] ENDOSCOPY   • EP DEVICE PROCEDURE N/A 07/17/2024    Remove & replace PPM gen biv multi leads performed by Paulo Gardner MD at [facility] CARDIAC CATH LAB   • EYE SURGERY     • HYSTERECTOMY (CERVIX STATUS UNKNOWN)  1973   •

## 2025-03-06 ENCOUNTER — TELEPHONE (OUTPATIENT)
Age: 75
End: 2025-03-06

## 2025-03-06 DIAGNOSIS — N28.9 RENAL DYSFUNCTION: ICD-10-CM

## 2025-03-06 DIAGNOSIS — N28.9 RENAL DYSFUNCTION: Primary | ICD-10-CM

## 2025-03-06 NOTE — TELEPHONE ENCOUNTER
----- Message from ELSIE PALM RN sent at 2/28/2025  6:08 PM EST -----  Per your last office note:    This lady has multiple comorbidities and it is extremely difficult to assess her cardiac risk at the time of any noncardiac surgery or invasive intervention such as colonoscopy and endoscopy.  However, due to her multiple comorbidities which include paroxysmal atrial fibrillation, chronic systolic heart failure, and end-stage renal disease I believe her risk has to be considered to be high with any intervention.  Currently she can lay semirecumbent without shortness of breath despite her bibasilar rales and it is really hard to assess how she will tolerate her colonoscopy however she is always semirecumbent in leads currently appears to be tolerating that position well without shortness of breath.  I am going to go ahead and get another BMP to document her renal function and am going to get a BNP as well since if that is in the normal range which it was when it was last obtained on November 24, 2024 we could at least say that she appears to be adequately compensated from a heart failure vantage with her aggressive diuretic therapy despite her renal insufficiency.     Her biventricular pacer has been functioning well, but as noted in the EKG review above she did have a number of paroxysmal episodes of atrial fibrillation and currently is still taking Eliquis despite her rather significant anemia.  I do not see that she has had a CBC on the chart since back in November and certainly she needs to have that completed prior to any procedure.     Her blood pressure seems to be somewhat suboptimally controlled again and her Entresto was discontinued either due to low blood pressure which is what she remembers or possibly worsening renal failure which apparently has improved.  If her repeat BMP shows that she is still at the stage IV level of chronic kidney failure I would like to discuss with nephrology whether we could 
I called and talked to the patient about her BMP and BNP which was done a little over a week ago and her kidney function is slightly worse than it had been with her creatinine up to 1.61 from 1.41 since she started the Entresto.  Her BNP was completely normal at 139.2.  I put in an order for her to get a BMP which she can get done before she comes in to see me on Tuesday if she would like just to be sure that her kidney function is not getting worse.  She relates that her breathing is comfortable at this time.    Amador Barger JR, DO    
consider restarting her low-dose Entresto 24-26 milligrams twice daily for blood pressure control if that is not possible I would have to consider possibly adding nitrates and hydralazine.     As indicated above I would assess her risk of any procedure to be high cardiovascular vantage, but that being said if it is felt that this risk is worth taking I would consider discontinuing Eliquis for 48 hours prior to the procedure and restarting it as soon as possible after the procedure

## 2025-03-11 ENCOUNTER — TELEPHONE (OUTPATIENT)
Age: 75
End: 2025-03-11

## 2025-03-11 ENCOUNTER — HOSPITAL ENCOUNTER (OUTPATIENT)
Facility: HOSPITAL | Age: 75
Setting detail: SPECIMEN
Discharge: HOME | End: 2025-03-14

## 2025-03-11 ENCOUNTER — OFFICE VISIT (OUTPATIENT)
Age: 75
End: 2025-03-11
Payer: MEDICARE

## 2025-03-11 VITALS
HEART RATE: 79 BPM | OXYGEN SATURATION: 100 % | BODY MASS INDEX: 34.77 KG/M2 | DIASTOLIC BLOOD PRESSURE: 64 MMHG | SYSTOLIC BLOOD PRESSURE: 122 MMHG | HEIGHT: 67 IN

## 2025-03-11 DIAGNOSIS — Z95.810 BIVENTRICULAR IMPLANTABLE CARDIOVERTER-DEFIBRILLATOR IN SITU: ICD-10-CM

## 2025-03-11 DIAGNOSIS — I42.8 NONISCHEMIC CARDIOMYOPATHY (HCC): ICD-10-CM

## 2025-03-11 DIAGNOSIS — I10 ESSENTIAL (PRIMARY) HYPERTENSION: ICD-10-CM

## 2025-03-11 DIAGNOSIS — Z13.0 SCREENING, ANEMIA, DEFICIENCY, IRON: Primary | ICD-10-CM

## 2025-03-11 DIAGNOSIS — Z79.4 TYPE 2 DIABETES MELLITUS WITH DIABETIC NEPHROPATHY, WITH LONG-TERM CURRENT USE OF INSULIN (HCC): ICD-10-CM

## 2025-03-11 DIAGNOSIS — I50.9 CHRONIC CONGESTIVE HEART FAILURE, UNSPECIFIED HEART FAILURE TYPE (HCC): ICD-10-CM

## 2025-03-11 DIAGNOSIS — D64.9 ANEMIA, UNSPECIFIED TYPE: ICD-10-CM

## 2025-03-11 DIAGNOSIS — I50.9 CHRONIC CONGESTIVE HEART FAILURE, UNSPECIFIED HEART FAILURE TYPE (HCC): Primary | ICD-10-CM

## 2025-03-11 DIAGNOSIS — N18.32 STAGE 3B CHRONIC KIDNEY DISEASE (HCC): ICD-10-CM

## 2025-03-11 DIAGNOSIS — E11.21 TYPE 2 DIABETES MELLITUS WITH DIABETIC NEPHROPATHY, WITH LONG-TERM CURRENT USE OF INSULIN (HCC): ICD-10-CM

## 2025-03-11 DIAGNOSIS — I50.22 CHRONIC SYSTOLIC CONGESTIVE HEART FAILURE (HCC): ICD-10-CM

## 2025-03-11 LAB — LABCORP SPECIMEN COLLECTION: NORMAL

## 2025-03-11 PROCEDURE — 1124F ACP DISCUSS-NO DSCNMKR DOCD: CPT | Performed by: INTERNAL MEDICINE

## 2025-03-11 PROCEDURE — 93000 ELECTROCARDIOGRAM COMPLETE: CPT | Performed by: INTERNAL MEDICINE

## 2025-03-11 PROCEDURE — 99001 SPECIMEN HANDLING PT-LAB: CPT

## 2025-03-11 PROCEDURE — 3078F DIAST BP <80 MM HG: CPT | Performed by: INTERNAL MEDICINE

## 2025-03-11 PROCEDURE — 3074F SYST BP LT 130 MM HG: CPT | Performed by: INTERNAL MEDICINE

## 2025-03-11 PROCEDURE — 99215 OFFICE O/P EST HI 40 MIN: CPT | Performed by: INTERNAL MEDICINE

## 2025-03-11 ASSESSMENT — PATIENT HEALTH QUESTIONNAIRE - PHQ9
SUM OF ALL RESPONSES TO PHQ QUESTIONS 1-9: 0
1. LITTLE INTEREST OR PLEASURE IN DOING THINGS: NOT AT ALL
SUM OF ALL RESPONSES TO PHQ QUESTIONS 1-9: 0
SUM OF ALL RESPONSES TO PHQ QUESTIONS 1-9: 0
2. FEELING DOWN, DEPRESSED OR HOPELESS: NOT AT ALL
SUM OF ALL RESPONSES TO PHQ QUESTIONS 1-9: 0

## 2025-03-11 NOTE — PROGRESS NOTES
Meggan Rossi presents today for   Chief Complaint   Patient presents with    Follow-up       Meggan PICKERING Rossi preferred language for health care discussion is english/other.    Is someone accompanying this pt? no    Is the patient using any DME equipment during OV? no    Depression Screening:  Depression: Not at risk (2/10/2025)    PHQ-2     PHQ-2 Score: 0        Learning Assessment:  Who is the primary learner? Patient    What is the preferred language for health care of the primary learner? ENGLISH    How does the primary learner prefer to learn new concepts? DEMONSTRATION    Answered By patient    Relationship to Learner SELF           Pt currently taking Anticoagulant therapy? eliquis    Pt currently taking Antiplatelet therapy ? no      Coordination of Care:  1. Have you been to the ER, urgent care clinic since your last visit? Hospitalized since your last visit? no    2. Have you seen or consulted any other health care providers outside of the [facility] since your last visit? Include any pap smears or colon screening. no

## 2025-03-11 NOTE — PROGRESS NOTES
03/11/25     Chief Complaint   Patient presents with    Follow-up       HPI:   Meggan Rossi is a 74 y.o. Black /  female  regarding her dilated nonischemic cardiomyopathy. She has history of a dilated cardiomyopathy with mild left ventricular dysfunction and an ejection fraction in the 40 to 45% range with widely patent coronary arteries and a cardiac catheterization back in August 1996. An echocardiogram completed on May 5, 2017 suggested an ejection fraction of 50%, but more recently her ejection fraction was down to 35 to 40% and an echo of July 2024.  She did have a biventricular AICD placed but due to trauma and infection in that area she had that removed and now she has a biventricular pacemaker with pulse generator residing in her left upper quadrant placed back in October 2012.     Unfortunately, she fell at home and traumatized her back developing an infection of her right Psoas muscle hematoma with development of epidural abscess with neurologic compromise resulting in paraplegia back in April 2017.     She had her pacemaker generator changed by Dr. Jagn back on February 26, 2019.  It should be noted that she had an infected right foot and was on antibiotics for for some time before the generator change and she is now off antibiotics and foot is now healing by secondary intention and apparently is doing well according to the patient.      Over the years her heart function has decreased and she had ejection fractions as low as 25 to 30% which improved on Entresto therapy, but that medication had to be completely discontinued due to worsening renal failure with her last BUN and creatinine being 64 and 3.20 when completed on November 24, 2024.  She also tells me that her blood pressure had been dropping and she thought that was the reason that the Entresto was stopped, but more recently her blood pressure has been going up and she has been in the 150 systolic range which is slightly above

## 2025-03-11 NOTE — TELEPHONE ENCOUNTER
Verbal order and read back per Amador Barger JR, DO  Add on Iron and TIBC to patient's labs that she had drawn today.     Called [facility] regarding this. Was advised that it is too soon to add on a lab, and to call back tomorrow morning.     Was given the following account # for reference: [id number]

## 2025-03-12 DIAGNOSIS — Z13.0 SCREENING, ANEMIA, DEFICIENCY, IRON: ICD-10-CM

## 2025-03-12 LAB
BUN SERPL-MCNC: 34 MG/DL (ref 8–27)
BUN/CREAT SERPL: 22 (ref 12–28)
CALCIUM SERPL-MCNC: 9.5 MG/DL (ref 8.7–10.3)
CHLORIDE SERPL-SCNC: 102 MMOL/L (ref 96–106)
CO2 SERPL-SCNC: 23 MMOL/L (ref 20–29)
CREAT SERPL-MCNC: 1.55 MG/DL (ref 0.57–1)
EGFRCR SERPLBLD CKD-EPI 2021: 35 ML/MIN/1.73
GLUCOSE SERPL-MCNC: 98 MG/DL (ref 70–99)
NT-PROBNP SERPL-MCNC: 1342 PG/ML (ref 0–301)
NT-PROBNP SERPL-MCNC: 1413 PG/ML (ref 0–301)
POTASSIUM SERPL-SCNC: 4.3 MMOL/L (ref 3.5–5.2)
SODIUM SERPL-SCNC: 139 MMOL/L (ref 134–144)
SPECIMEN STATUS REPORT: NORMAL

## 2025-03-13 ENCOUNTER — RESULTS FOLLOW-UP (OUTPATIENT)
Age: 75
End: 2025-03-13

## 2025-03-13 ENCOUNTER — OFFICE VISIT (OUTPATIENT)
Age: 75
End: 2025-03-13
Payer: MEDICARE

## 2025-03-13 VITALS
WEIGHT: 222 LBS | HEART RATE: 72 BPM | SYSTOLIC BLOOD PRESSURE: 136 MMHG | DIASTOLIC BLOOD PRESSURE: 88 MMHG | OXYGEN SATURATION: 100 % | HEIGHT: 67 IN | RESPIRATION RATE: 18 BRPM | BODY MASS INDEX: 34.84 KG/M2 | TEMPERATURE: 97.1 F

## 2025-03-13 DIAGNOSIS — K59.02 CONSTIPATION DUE TO OUTLET DYSFUNCTION: ICD-10-CM

## 2025-03-13 DIAGNOSIS — N81.6 RECTOCELE: ICD-10-CM

## 2025-03-13 DIAGNOSIS — K64.0 GRADE I HEMORRHOIDS: Primary | ICD-10-CM

## 2025-03-13 DIAGNOSIS — K59.00 CONSTIPATION, UNSPECIFIED CONSTIPATION TYPE: ICD-10-CM

## 2025-03-13 DIAGNOSIS — K62.5 RECTAL BLEEDING: ICD-10-CM

## 2025-03-13 LAB
IRON SATN MFR SERPL: 13 % (ref 15–55)
IRON SERPL-MCNC: 31 UG/DL (ref 27–139)
TIBC SERPL-MCNC: 232 UG/DL (ref 250–450)
UIBC SERPL-MCNC: 201 UG/DL (ref 118–369)

## 2025-03-13 PROCEDURE — 99214 OFFICE O/P EST MOD 30 MIN: CPT | Performed by: COLON & RECTAL SURGERY

## 2025-03-13 PROCEDURE — 1124F ACP DISCUSS-NO DSCNMKR DOCD: CPT | Performed by: COLON & RECTAL SURGERY

## 2025-03-13 RX ORDER — TRIAMCINOLONE ACETONIDE 1 MG/G
OINTMENT TOPICAL
Qty: 30 G | Refills: 0 | Status: SHIPPED | OUTPATIENT
Start: 2025-03-13

## 2025-03-13 NOTE — PROGRESS NOTES
Subjective: She continues to complain of significant rectal pain.  She has discomfort sitting even on the bed she states she has irregular bowel function and difficulty with bowel movements.  When she takes the MiraLAX she gets too loose and then takes Imodium.  It is unclear how long she had tried the steroid ointment.  She has had substantial bleeding in the past.  She does not complain of that presently.  She had a colonoscopy which was normal.  Rectal prolapse was noted.    Past medical history and ROS were reviewed and unchanged.     Rectum: Large external hemorrhoids  Several Valsalva times with no rectal prolapse    Assessment / Plan    Anorectal pain likely secondary to hemorrhoids  She has some underlying constipation issues  There was a question of rectal prolapse raised by the gastroenterologist on colonoscopy  Continue steroid ointment  Citrucel daily to promote bowel function  Defecography to evaluate for intussusception, rectal prolapse and any other pelvic floor disorders that may be contributing to her constipation  Follow-up in a month  She would need cardiac clearance for any type of procedure, be at hemorrhoidectomy or Altmeyer/Delorme.  The amount of pain she is having is fairly unusual, unclear if this is related to her hemorrhoids    30 minutes was spent in patient care.      The diagnoses and plan were discussed with patient.  All questions answered.  Plan of care agreed to by all concerned.

## 2025-03-14 ENCOUNTER — TELEPHONE (OUTPATIENT)
Age: 75
End: 2025-03-14

## 2025-03-14 DIAGNOSIS — I50.9 CHRONIC CONGESTIVE HEART FAILURE, UNSPECIFIED HEART FAILURE TYPE (HCC): Primary | ICD-10-CM

## 2025-03-14 NOTE — TELEPHONE ENCOUNTER
S/W pt and advised her that I am verifying with the cardiologist to if the pacemaker she has is compatible with an MRI. Message sent to Cardiology for this information. Waiting on MRI safety form to fill out and send back.

## 2025-03-14 NOTE — TELEPHONE ENCOUNTER
----- Message from ELSIE PALM RN sent at 3/13/2025  8:21 AM EDT -----  Discussion: This lady is somewhat difficult to assess because clinically she does not show signs of overt heart failure.  However, she has had a very high OptiVol now for 3 months and it turns out she has not been taking her Bumex as directed and I am going to go ahead and get a BMP and BNP on her to reassess her stage III chronic kidney disease and heart failure.  Once I have the results of these tests I will be adjusting her diuretic therapy and possibly further increasing her Entresto since her blood pressure still above 120 systolic and she is already on Entresto 49/51 mg twice daily.     She does have a microcytic anemia and I do not know whether she has had an iron TIBC completed, however, she is not on iron therapy and frequently these patients need IV iron therapy so I am going to get an iron and TIBC as well I will make further recommendations after reviewing these test.

## 2025-03-14 NOTE — TELEPHONE ENCOUNTER
Verbal order and read back per Amador Barger JR, DO  she has only been on Bumex 0.5 mg daily so I would asked her to increase it back to 1 mg twice daily ever going to need to call and potassium 20 mEq twice daily and we will need to have her get her BMP and BNP repeated in about 10 days.

## 2025-03-14 NOTE — TELEPHONE ENCOUNTER
I called and discussed the patient's laboratory tests including her BMP, BNP and her iron and TIBC.  Her BNP still shows grade 3B chronic kidney disease but appears to be stable with a potassium of 4.3 but her BNP is elevated at 1413 and her OptiVol is very high so it clearly would suggest she is in heart failure and she has only been on Bumex 0.5 mg daily so I would asked her to increase it back to 1 mg twice daily ever going to need to call and potassium 20 mEq twice daily and we will need to have her get her BMP and BNP repeated in about 10 days.  Please call in prescription for her potassium and Bumex.    Amador Barger JR, DO

## 2025-03-17 RX ORDER — BUMETANIDE 1 MG/1
1 TABLET ORAL 2 TIMES DAILY
Qty: 60 TABLET | Refills: 5 | Status: SHIPPED | OUTPATIENT
Start: 2025-03-17

## 2025-03-17 RX ORDER — POTASSIUM CHLORIDE 1500 MG/1
20 TABLET, EXTENDED RELEASE ORAL 2 TIMES DAILY
Qty: 60 TABLET | Refills: 5 | Status: SHIPPED | OUTPATIENT
Start: 2025-03-17

## 2025-03-18 LAB — SPECIMEN STATUS REPORT: NORMAL

## 2025-03-19 ENCOUNTER — TELEPHONE (OUTPATIENT)
Age: 75
End: 2025-03-19

## 2025-03-21 DIAGNOSIS — I50.9 CHRONIC CONGESTIVE HEART FAILURE, UNSPECIFIED HEART FAILURE TYPE (HCC): ICD-10-CM

## 2025-03-21 NOTE — TELEPHONE ENCOUNTER
LMOM for pt to return call regarding MRI defecography and to if [facility] can do the MRI defecography for the pt.     Waiting on cardiology who sent the pacemaker information to [facility].

## 2025-03-28 ENCOUNTER — TELEPHONE (OUTPATIENT)
Age: 75
End: 2025-03-28

## 2025-03-28 NOTE — TELEPHONE ENCOUNTER
Dr. Khan's office reached out and stated that they would like for this patient to have an MRI done.     Per Ranjith ([employer] representative), this patient's device is not MRI Safe.     Reached out to Dr. Khan's staff, making them aware.

## 2025-03-28 NOTE — TELEPHONE ENCOUNTER
Nilda Ridley, cardiology, was able to get a hold of a rep with [employer], who confirmed that the pt's pacemaker is NOT compatible with MRI's. Pt cannot have the defecography. Dr. Khan has been advised.

## 2025-03-28 NOTE — TELEPHONE ENCOUNTER
S/W Rosalina with [facility] and was advised that the information originally given to see if the pacemaker was compatible with their MRI machine was incorrect. Per Rosalina, the orders should of been faxed along with the chart notes and then Zoila would of contacted the cardiologist.     Rosalina stated that they did receive information from the cardiologist but it did not state what it was for. Rosalina said that she would send it to radiology for clarification.     I contacted Nilda Ridley, cardiology and advised her of this.

## 2025-03-28 NOTE — TELEPHONE ENCOUNTER
LMOM for pt to return call regarding the Defecography and incompatibility of the pacemaker with the MRI.

## 2025-03-31 ENCOUNTER — TELEPHONE (OUTPATIENT)
Age: 75
End: 2025-03-31

## 2025-03-31 DIAGNOSIS — K59.02 CONSTIPATION DUE TO OUTLET DYSFUNCTION: Primary | ICD-10-CM

## 2025-03-31 NOTE — TELEPHONE ENCOUNTER
Pt called and left a message on the nurse line and would like to speak to someone in regards to appt on 4.3.2025

## 2025-04-02 ENCOUNTER — TELEPHONE (OUTPATIENT)
Age: 75
End: 2025-04-02

## 2025-04-02 NOTE — TELEPHONE ENCOUNTER
Called back Susan with [facility]. She stated that there are 2 large steps that the patient has to walk up to for the XR defecating proctogram. Patient stated that she cannot walk up the steps even with assistance.  I was not advised of the steps when I scheduled the patient. I did advise the  that the patient is in a wheelchair.      Susan is sending the patient home as they cannot perform the test.     Do you have any other test that the patient can do?

## 2025-04-10 ENCOUNTER — HOSPITAL ENCOUNTER (OUTPATIENT)
Facility: HOSPITAL | Age: 75
Setting detail: SPECIMEN
Discharge: HOME | End: 2025-04-13

## 2025-04-10 ENCOUNTER — OFFICE VISIT (OUTPATIENT)
Age: 75
End: 2025-04-10
Payer: MEDICARE

## 2025-04-10 DIAGNOSIS — K64.2 GRADE III HEMORRHOIDS: ICD-10-CM

## 2025-04-10 DIAGNOSIS — K64.2 GRADE III HEMORRHOIDS: Primary | ICD-10-CM

## 2025-04-10 LAB — LABCORP SPECIMEN COLLECTION: NORMAL

## 2025-04-10 PROCEDURE — 99214 OFFICE O/P EST MOD 30 MIN: CPT | Performed by: COLON & RECTAL SURGERY

## 2025-04-10 PROCEDURE — 1124F ACP DISCUSS-NO DSCNMKR DOCD: CPT | Performed by: COLON & RECTAL SURGERY

## 2025-04-10 PROCEDURE — 99001 SPECIMEN HANDLING PT-LAB: CPT

## 2025-04-10 NOTE — PROGRESS NOTES
Subjective: Continued rectal bleeding and now some fatigue.  Was not able to have defecography due to inability to walk up the steps.    Past medical history and ROS were reviewed and unchanged.     Exam deferred    Picture shown to her daughter who tells me that there is more of a radial appearance to the prolapse, likely consistent with hemorrhoids    Assessment / Plan    Grade 3 hemorrhoids  Schedule hemorrhoidectomy  If she has full-thickness rectal prolapse will defer on managing that at this point given the need for a bowel prep and her overall severe fatigue  Be sure hemoglobin is not substantially low  Cardiac clearance    30 minutes was spent in patient care.      The diagnoses and plan were discussed with patient.  All questions answered.  Plan of care agreed to by all concerned.

## 2025-04-11 ENCOUNTER — RESULTS FOLLOW-UP (OUTPATIENT)
Age: 75
End: 2025-04-11

## 2025-04-11 LAB
BUN SERPL-MCNC: 45 MG/DL (ref 8–27)
BUN/CREAT SERPL: 18 (ref 12–28)
CALCIUM SERPL-MCNC: 9.2 MG/DL (ref 8.7–10.3)
CHLORIDE SERPL-SCNC: 96 MMOL/L (ref 96–106)
CO2 SERPL-SCNC: 22 MMOL/L (ref 20–29)
CREAT SERPL-MCNC: 2.52 MG/DL (ref 0.57–1)
EGFRCR SERPLBLD CKD-EPI 2021: 19 ML/MIN/1.73
ERYTHROCYTE [DISTWIDTH] IN BLOOD BY AUTOMATED COUNT: 16.5 % (ref 11.7–15.4)
GLUCOSE SERPL-MCNC: 103 MG/DL (ref 70–99)
HCT VFR BLD AUTO: 29.6 % (ref 34–46.6)
HGB BLD-MCNC: 9.5 G/DL (ref 11.1–15.9)
MCH RBC QN AUTO: 26 PG (ref 26.6–33)
MCHC RBC AUTO-ENTMCNC: 32.1 G/DL (ref 31.5–35.7)
MCV RBC AUTO: 81 FL (ref 79–97)
PLATELET # BLD AUTO: 207 X10E3/UL (ref 150–450)
POTASSIUM SERPL-SCNC: 4.4 MMOL/L (ref 3.5–5.2)
RBC # BLD AUTO: 3.66 X10E6/UL (ref 3.77–5.28)
SODIUM SERPL-SCNC: 134 MMOL/L (ref 134–144)
WBC # BLD AUTO: 5.7 X10E3/UL (ref 3.4–10.8)

## 2025-04-11 NOTE — TELEPHONE ENCOUNTER
Called patient and notified her of results. Patient understands.    ----- Message from Dr. Jamie Khan MD sent at 4/11/2025 11:27 AM EDT -----  Let pt know her hgb is ok  ----- Message -----  From: [facility] Incoming Amb Ref Lab Orders To [facility]  Sent: 4/11/2025   9:35 AM EDT  To: Jamie Khan MD

## 2025-04-14 ENCOUNTER — TELEPHONE (OUTPATIENT)
Age: 75
End: 2025-04-14

## 2025-04-14 NOTE — TELEPHONE ENCOUNTER
PANFILO for miky Garay, to return call. Dr. Khan asked me to call Tanisha to help the pt set an appointment for surgery.

## 2025-04-15 ENCOUNTER — PREP FOR PROCEDURE (OUTPATIENT)
Age: 75
End: 2025-04-15

## 2025-04-15 DIAGNOSIS — K64.2 GRADE III HEMORRHOIDS: ICD-10-CM

## 2025-05-05 ENCOUNTER — TELEPHONE (OUTPATIENT)
Age: 75
End: 2025-05-05

## 2025-05-05 NOTE — TELEPHONE ENCOUNTER
Received clearance for pt to hold Eliquis for 48 hrs prior to surgery. Pt advised to hold Eliquis as of 05/11/25 until after surgery.     Pt verbalized understanding.

## 2025-05-05 NOTE — TELEPHONE ENCOUNTER
Received a fax from [facility] requesting cardiac clearance for hemorrhoidectomy. Procedure date is 5-.    Verbal order and read back per Amador Barger JR, DO  Patient is cleared from a cardiac standpoint. Eliquis can be held for 48 hours prior to procedure.     Clearance form stating the above faxed to [phone].

## 2025-05-06 ENCOUNTER — CLINICAL SUPPORT (OUTPATIENT)
Age: 75
End: 2025-05-06
Payer: MEDICARE

## 2025-05-06 ENCOUNTER — HOSPITAL ENCOUNTER (OUTPATIENT)
Age: 75
Setting detail: SPECIMEN
Discharge: HOME | End: 2025-05-09

## 2025-05-06 ENCOUNTER — TELEPHONE (OUTPATIENT)
Age: 75
End: 2025-05-06

## 2025-05-06 ENCOUNTER — OFFICE VISIT (OUTPATIENT)
Age: 75
End: 2025-05-06
Payer: MEDICARE

## 2025-05-06 VITALS
WEIGHT: 218 LBS | SYSTOLIC BLOOD PRESSURE: 126 MMHG | DIASTOLIC BLOOD PRESSURE: 64 MMHG | HEIGHT: 67 IN | BODY MASS INDEX: 34.21 KG/M2 | HEART RATE: 81 BPM | OXYGEN SATURATION: 97 %

## 2025-05-06 DIAGNOSIS — E11.21 TYPE 2 DIABETES MELLITUS WITH DIABETIC NEPHROPATHY, WITH LONG-TERM CURRENT USE OF INSULIN (HCC): ICD-10-CM

## 2025-05-06 DIAGNOSIS — I50.42 CHRONIC COMBINED SYSTOLIC AND DIASTOLIC CONGESTIVE HEART FAILURE (HCC): Primary | ICD-10-CM

## 2025-05-06 DIAGNOSIS — K64.2 PROLAPSED INTERNAL HEMORRHOIDS, GRADE 3: ICD-10-CM

## 2025-05-06 DIAGNOSIS — I50.22 CHRONIC SYSTOLIC CONGESTIVE HEART FAILURE (HCC): ICD-10-CM

## 2025-05-06 DIAGNOSIS — D50.0 IRON DEFICIENCY ANEMIA DUE TO CHRONIC BLOOD LOSS: ICD-10-CM

## 2025-05-06 DIAGNOSIS — Z95.810 BIVENTRICULAR IMPLANTABLE CARDIOVERTER-DEFIBRILLATOR IN SITU: ICD-10-CM

## 2025-05-06 DIAGNOSIS — I42.8 NONISCHEMIC CARDIOMYOPATHY (HCC): ICD-10-CM

## 2025-05-06 DIAGNOSIS — Z95.0 BIVENTRICULAR CARDIAC PACEMAKER IN SITU: Primary | ICD-10-CM

## 2025-05-06 DIAGNOSIS — Z79.4 TYPE 2 DIABETES MELLITUS WITH DIABETIC NEPHROPATHY, WITH LONG-TERM CURRENT USE OF INSULIN (HCC): ICD-10-CM

## 2025-05-06 DIAGNOSIS — N18.4 CHRONIC KIDNEY DISEASE (CKD), STAGE IV (SEVERE) (HCC): ICD-10-CM

## 2025-05-06 DIAGNOSIS — I10 HYPERTENSION, UNSPECIFIED TYPE: ICD-10-CM

## 2025-05-06 PROCEDURE — 3074F SYST BP LT 130 MM HG: CPT | Performed by: INTERNAL MEDICINE

## 2025-05-06 PROCEDURE — 93281 PM DEVICE PROGR EVAL MULTI: CPT | Performed by: INTERNAL MEDICINE

## 2025-05-06 PROCEDURE — 99215 OFFICE O/P EST HI 40 MIN: CPT | Performed by: INTERNAL MEDICINE

## 2025-05-06 PROCEDURE — 1124F ACP DISCUSS-NO DSCNMKR DOCD: CPT | Performed by: INTERNAL MEDICINE

## 2025-05-06 PROCEDURE — 3078F DIAST BP <80 MM HG: CPT | Performed by: INTERNAL MEDICINE

## 2025-05-06 RX ORDER — FOLIC ACID 1 MG/1
1000 TABLET ORAL DAILY
COMMUNITY
Start: 2025-04-15

## 2025-05-06 NOTE — TELEPHONE ENCOUNTER
I called and talked to the patient about her Entresto and her chronic renal failure.  Her last BUN and creatinine suggested that she is now in stage IV chronic renal disease and has a estimated EGFR of less than 30 and consequently she should be on the lowest dose of Entresto which is what I have written for her, but I told her to be sure she gets that and starts taking that tomorrow and if her blood pressure is low tonight she could hold her 49/51 milligram dose tonight and just start with the 24/28 mg twice daily tomorrow.  She did tell me that she is did get a CBC and a BMP today for her preop lab work, so we will need to review those tests.    Amador Barger JR, DO

## 2025-05-06 NOTE — TELEPHONE ENCOUNTER
Aware provider is out of office. OK to wait until provider returns.     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 Please advise what the pt is to do after surgery.     Pt # [phone].

## 2025-05-06 NOTE — PROGRESS NOTES
05/06/25     Chief Complaint   Patient presents with    Follow-up     6 month follow up    Device Check     [employer]       HPI:   Meggan Rossi is a 74 y.o. Black /  female regarding her dilated nonischemic cardiomyopathy. She has history of a dilated cardiomyopathy with mild left ventricular dysfunction and an ejection fraction in the 40 to 45% range with widely patent coronary arteries and a cardiac catheterization back in August 1996. An echocardiogram completed on May 5, 2017 suggested an ejection fraction of 50%, but more recently her ejection fraction was down to 35 to 40% and an echo of July 2024.  She did have a biventricular AICD placed but due to trauma and infection in that area she had that removed and now she has a biventricular pacemaker with pulse generator residing in her left upper quadrant placed back in October 2012.     Unfortunately, she fell at home and traumatized her back developing an infection of her right Psoas muscle hematoma with development of epidural abscess with neurologic compromise resulting in paraplegia back in April 2017.     She had her pacemaker generator changed by Dr. Jang back on February 26, 2019.  It should be noted that she had an infected right foot and was on antibiotics for for some time before the generator change and she is now off antibiotics and foot is now healing by secondary intention and apparently is doing well according to the patient.      Over the years her heart function has decreased and she had ejection fractions as low as 25 to 30% which improved on Entresto therapy, but that medication had to be completely discontinued due to worsening renal failure with her last BUN and creatinine being 64 and 3.20 when completed on November 24, 2024.  She also tells me that her blood pressure had been dropping and she thought that was the reason that the Entresto was stopped, but more recently her blood pressure has been going up and she has been

## 2025-05-06 NOTE — PROGRESS NOTES
Meggan Rossi presents today for   Chief Complaint   Patient presents with    Follow-up     6 month follow up       Meggan Rossi preferred language for health care discussion is english/other.    Is someone accompanying this pt? yes    Is the patient using any DME equipment during OV? yes    Depression Screening:  Depression: Not at risk (3/11/2025)    PHQ-2     PHQ-2 Score: 0        Learning Assessment:  No question data found.       Pt currently taking Anticoagulant therapy? Eliquis 5 mg twice a day    Pt currently taking Antiplatelet therapy ? no      Coordination of Care:  1. Have you been to the ER, urgent care clinic since your last visit? Hospitalized since your last visit? no    2. Have you seen or consulted any other health care providers outside of the [facility] since your last visit? Include any pap smears or colon screening. no

## 2025-05-07 NOTE — TELEPHONE ENCOUNTER
PHIL pt and advised her of message from Dr. Khan that she will not be staying in the hospital and will need someone to care for her or go to a rehab facility for care while she heals or she will have to postpone the procedure until she has someone to help her. . I asked if she would like me to call her daughter, she said that she would talk to her daughter tonight and will call tomorrow to advise what she would like to do.

## 2025-05-08 LAB
BUN SERPL-MCNC: 27 MG/DL (ref 8–27)
BUN/CREAT SERPL: 18 (ref 12–28)
CALCIUM SERPL-MCNC: 8.9 MG/DL (ref 8.7–10.3)
CHLORIDE SERPL-SCNC: 105 MMOL/L (ref 96–106)
CO2 SERPL-SCNC: 23 MMOL/L (ref 20–29)
CREAT SERPL-MCNC: 1.48 MG/DL (ref 0.57–1)
ERYTHROCYTE [DISTWIDTH] IN BLOOD BY AUTOMATED COUNT: 16 % (ref 11.7–15.4)
GLUCOSE SERPL-MCNC: 114 MG/DL (ref 70–99)
HCT VFR BLD AUTO: 26.9 % (ref 34–46.6)
HGB BLD-MCNC: 8.3 G/DL (ref 11.1–15.9)
MCH RBC QN AUTO: 26.1 PG (ref 26.6–33)
MCHC RBC AUTO-ENTMCNC: 30.9 G/DL (ref 31.5–35.7)
MCV RBC AUTO: 85 FL (ref 79–97)
PLATELET # BLD AUTO: 253 X10E3/UL (ref 150–450)
POTASSIUM SERPL-SCNC: 4 MMOL/L (ref 3.5–5.2)
RBC # BLD AUTO: 3.18 X10E6/UL (ref 3.77–5.28)
SODIUM SERPL-SCNC: 140 MMOL/L (ref 134–144)
WBC # BLD AUTO: 5.1 X10E3/UL (ref 3.4–10.8)

## 2025-05-29 ENCOUNTER — RESULTS FOLLOW-UP (OUTPATIENT)
Age: 75
End: 2025-05-29

## 2025-05-30 NOTE — RESULT ENCOUNTER NOTE
Device check personally reviewed by me.  Episodes of A-fib noted.  Normal device function on interrogation.  See scanned interrogation document for complete details.

## 2025-07-25 RX ORDER — BUMETANIDE 2 MG/1
1 TABLET ORAL DAILY
Qty: 45 TABLET | Refills: 3 | Status: SHIPPED | OUTPATIENT
Start: 2025-07-25

## 2025-07-31 ENCOUNTER — HOSPITAL ENCOUNTER (OUTPATIENT)
Facility: HOSPITAL | Age: 75
Discharge: HOME | End: 2025-07-31
Payer: MEDICARE

## 2025-07-31 VITALS — BODY MASS INDEX: 34.53 KG/M2 | HEIGHT: 67 IN | WEIGHT: 220 LBS

## 2025-07-31 DIAGNOSIS — Z12.39 ENCOUNTER FOR OTHER SCREENING FOR MALIGNANT NEOPLASM OF BREAST: ICD-10-CM

## 2025-07-31 PROCEDURE — 77067 SCR MAMMO BI INCL CAD: CPT

## 2025-08-13 ENCOUNTER — CLINICAL SUPPORT (OUTPATIENT)
Age: 75
End: 2025-08-13
Payer: MEDICARE

## 2025-08-13 ENCOUNTER — TELEPHONE (OUTPATIENT)
Age: 75
End: 2025-08-13

## 2025-08-13 DIAGNOSIS — Z95.0 BIVENTRICULAR CARDIAC PACEMAKER IN SITU: Primary | ICD-10-CM

## 2025-08-13 DIAGNOSIS — I42.8 NONISCHEMIC CARDIOMYOPATHY (HCC): ICD-10-CM

## 2025-08-13 PROCEDURE — 93295 DEV INTERROG REMOTE 1/2/MLT: CPT | Performed by: INTERNAL MEDICINE

## 2025-08-18 ENCOUNTER — OFFICE VISIT (OUTPATIENT)
Age: 75
End: 2025-08-18
Payer: MEDICARE

## 2025-08-18 VITALS
HEART RATE: 72 BPM | BODY MASS INDEX: 34.46 KG/M2 | HEIGHT: 67 IN | DIASTOLIC BLOOD PRESSURE: 68 MMHG | OXYGEN SATURATION: 99 % | SYSTOLIC BLOOD PRESSURE: 120 MMHG

## 2025-08-18 DIAGNOSIS — E11.42 TYPE 2 DIABETES MELLITUS WITH DIABETIC POLYNEUROPATHY, WITH LONG-TERM CURRENT USE OF INSULIN (HCC): ICD-10-CM

## 2025-08-18 DIAGNOSIS — N18.32 TYPE 2 DIABETES MELLITUS WITH STAGE 3B CHRONIC KIDNEY DISEASE, WITH LONG-TERM CURRENT USE OF INSULIN (HCC): Chronic | ICD-10-CM

## 2025-08-18 DIAGNOSIS — Z99.3 WHEELCHAIR DEPENDENCE: ICD-10-CM

## 2025-08-18 DIAGNOSIS — Z79.4 TYPE 2 DIABETES MELLITUS WITH STAGE 3B CHRONIC KIDNEY DISEASE, WITH LONG-TERM CURRENT USE OF INSULIN (HCC): Chronic | ICD-10-CM

## 2025-08-18 DIAGNOSIS — M86.9 OSTEOMYELITIS OF RIGHT FOOT, UNSPECIFIED TYPE (HCC): ICD-10-CM

## 2025-08-18 DIAGNOSIS — E78.5 DYSLIPIDEMIA: ICD-10-CM

## 2025-08-18 DIAGNOSIS — E11.22 TYPE 2 DIABETES MELLITUS WITH STAGE 3B CHRONIC KIDNEY DISEASE, WITH LONG-TERM CURRENT USE OF INSULIN (HCC): Chronic | ICD-10-CM

## 2025-08-18 DIAGNOSIS — Z79.4 TYPE 2 DIABETES MELLITUS WITH DIABETIC POLYNEUROPATHY, WITH LONG-TERM CURRENT USE OF INSULIN (HCC): ICD-10-CM

## 2025-08-18 DIAGNOSIS — I50.23 SYSTOLIC CHF, ACUTE ON CHRONIC (HCC): ICD-10-CM

## 2025-08-18 DIAGNOSIS — G82.20 ACUTE PARAPLEGIA (HCC): ICD-10-CM

## 2025-08-18 DIAGNOSIS — G47.33 OBSTRUCTIVE SLEEP APNEA SYNDROME: ICD-10-CM

## 2025-08-18 DIAGNOSIS — N18.32 ANEMIA DUE TO STAGE 3B CHRONIC KIDNEY DISEASE (HCC): ICD-10-CM

## 2025-08-18 DIAGNOSIS — K64.2 GRADE III HEMORRHOIDS: ICD-10-CM

## 2025-08-18 DIAGNOSIS — D63.1 ANEMIA DUE TO STAGE 3B CHRONIC KIDNEY DISEASE (HCC): ICD-10-CM

## 2025-08-18 DIAGNOSIS — I42.8 NONISCHEMIC CARDIOMYOPATHY (HCC): ICD-10-CM

## 2025-08-18 DIAGNOSIS — Z95.810 BIVENTRICULAR IMPLANTABLE CARDIOVERTER-DEFIBRILLATOR IN SITU: Primary | ICD-10-CM

## 2025-08-18 DIAGNOSIS — R33.9 URINARY RETENTION: ICD-10-CM

## 2025-08-18 PROCEDURE — 99214 OFFICE O/P EST MOD 30 MIN: CPT | Performed by: INTERNAL MEDICINE

## 2025-08-18 PROCEDURE — 1124F ACP DISCUSS-NO DSCNMKR DOCD: CPT | Performed by: INTERNAL MEDICINE

## 2025-08-18 RX ORDER — INSULIN GLARGINE 100 [IU]/ML
4 INJECTION, SOLUTION SUBCUTANEOUS NIGHTLY
COMMUNITY

## 2025-08-18 ASSESSMENT — PATIENT HEALTH QUESTIONNAIRE - PHQ9
SUM OF ALL RESPONSES TO PHQ QUESTIONS 1-9: 0
1. LITTLE INTEREST OR PLEASURE IN DOING THINGS: NOT AT ALL
2. FEELING DOWN, DEPRESSED OR HOPELESS: NOT AT ALL

## (undated) DEVICE — SWAB CULT LIQ STUART AGR AERB MOD IN BRK SGL RAYON TIP PLAS 220099] BECTON DICKINSON MICRO]

## (undated) DEVICE — GUIDEWIRE: Brand: AMPLATZ SUPER STIFF™

## (undated) DEVICE — REM POLYHESIVE ADULT PATIENT RETURN ELECTRODE: Brand: VALLEYLAB

## (undated) DEVICE — CANNULA ORIG TL CLR W FOAM CUSHIONS AND 14FT SUPL TB 3 CHN

## (undated) DEVICE — SUTURE MCRYL SZ 4 0 L18IN ABSRB VLT PS 1 L24MM 3 8 CIR REV Y682H

## (undated) DEVICE — SYR 10ML LUER LOK 1/5ML GRAD --

## (undated) DEVICE — URINARY LEG BAG COMBINATION PACK: Brand: HOLLISTER

## (undated) DEVICE — PACK PROCEDURE SURG MAJ W/ BASIN LF

## (undated) DEVICE — BANDAGE COMPR W4INXL5YD BGE COHESIVE SELF ADH ADBAN CBN1104] AVCOR HEALTHCARE PRODUCTS INC]

## (undated) DEVICE — DRSG VAC ASST CLSR GRNUFM SM --

## (undated) DEVICE — SYRINGE IRRIG 60ML SFT PLIABLE BLB EZ TO GRP 1 HND USE W/

## (undated) DEVICE — PLASMABLADE PS200-040 4.0: Brand: PLASMABLADE™

## (undated) DEVICE — SHOE POSTOP M WOMAN 6-8 UNIV FOAM TRICOT SEMI FLX SKID

## (undated) DEVICE — SYRINGE MED 25GA 3ML L5/8IN SUBQ PLAS W/ DETACH NDL SFTY

## (undated) DEVICE — SUTURE ABSORBABLE BRAIDED 2-0 CT-1 27 IN UD VICRYL J259H

## (undated) DEVICE — MEDI-VAC SUCTION HIGH CAPACITY: Brand: CARDINAL HEALTH

## (undated) DEVICE — GAUZE,SPONGE,4"X4",16PLY,STRL,LF,10/TRAY: Brand: MEDLINE

## (undated) DEVICE — CURITY NON-ADHERENT STRIPS: Brand: CURITY

## (undated) DEVICE — STERILE POLYISOPRENE POWDER-FREE SURGICAL GLOVES: Brand: PROTEXIS

## (undated) DEVICE — KIT CLN UP BON SECOURS MARYV

## (undated) DEVICE — SUTURE PERMA HND SZ 0 L18IN NONABSORBABLE BLK L30MM PSL REV 580H

## (undated) DEVICE — CATH URETH FOL 2W SH 18FRX5ML --

## (undated) DEVICE — INTENDED FOR TISSUE SEPARATION, AND OTHER PROCEDURES THAT REQUIRE A SHARP SURGICAL BLADE TO PUNCTURE OR CUT.: Brand: BARD-PARKER ®  SAFETY SCALPED

## (undated) DEVICE — SOLUTION IV 1000ML 0.9% SOD CHL

## (undated) DEVICE — CANNULA NSL AD TBNG L14FT STD PVC O2 CRV CONN NONFLARED NSL

## (undated) DEVICE — CATHETER SUCT TR FL TIP 14FR W/ O CTRL

## (undated) DEVICE — SOLUTION IRRIG 1000ML H2O STRL BLT

## (undated) DEVICE — DRESSING FOAM 4X6 DISP POSTOP MEPILEX BORD AG

## (undated) DEVICE — CULTURETTE SGL EVAC TUBE PALL -- 100/CA

## (undated) DEVICE — MEDI-TRACE CADENCE ADULT, DEFIBRILLATION ELECTRODE -RTS  (10 PR/PK) - PHYSIO-CONTROL: Brand: MEDI-TRACE CADENCE

## (undated) DEVICE — INTENDED FOR TISSUE SEPARATION, AND OTHER PROCEDURES THAT REQUIRE A SHARP SURGICAL BLADE TO PUNCTURE OR CUT.: Brand: BARD-PARKER SAFETY BLADES SIZE 10, STERILE

## (undated) DEVICE — URETHRAL DILATOR SET: Brand: COOK

## (undated) DEVICE — BASIN EMSIS 16OZ GRAPHITE PLAS KID SHP MOLD GRAD FOR ORAL

## (undated) DEVICE — FLEX ADVANTAGE 3000CC: Brand: FLEX ADVANTAGE

## (undated) DEVICE — MEDI-VAC NON-CONDUCTIVE SUCTION TUBING: Brand: CARDINAL HEALTH

## (undated) DEVICE — ENDOSCOPY PUMP TUBING/ CAP SET: Brand: ERBE

## (undated) DEVICE — FORCEPS BX L240CM JAW DIA2.8MM L CAP W/ NDL MIC MESH TOOTH

## (undated) DEVICE — DRAPE,EXTREMITY,89X128,STERILE: Brand: MEDLINE

## (undated) DEVICE — BITE BLOCK ENDOSCP UNIV AD 6 TO 9.4 MM

## (undated) DEVICE — GARMENT,MEDLINE,DVT,INT,CALF,MED, GEN2: Brand: MEDLINE

## (undated) DEVICE — DRESSING HEMSTAT W4XL4IN 4 PLY WHT IMPREG KAOLIN HYDRPHLC

## (undated) DEVICE — FLUFF AND POLYMER UNDERPAD,EXTRA HEAVY: Brand: WINGS

## (undated) DEVICE — (D)PREP SKN CHLRAPRP APPL 26ML -- CONVERT TO ITEM 371833

## (undated) DEVICE — DRESSING NEG PRSS SM 10X7.5X3.3CM POLYUR FOR WND THER VAC

## (undated) DEVICE — SOLUTION IRRIG 3000ML 0.9% SOD CHL FLX CONT 0797208] ICU MEDICAL INC]

## (undated) DEVICE — 3M™ BAIR PAWS FLEX™ WARMING GOWN, STANDARD, 20 PER CASE 81003: Brand: BAIR PAWS™

## (undated) DEVICE — GAUZE SPONGES,16 PLY: Brand: CURITY

## (undated) DEVICE — DRAPE SURG W25XL50IN E OPN CIR BND BG

## (undated) DEVICE — CANISTER SUC GEL INFOVAC 500ML --

## (undated) DEVICE — 3M™ IOBAN™ 2 ANTIMICROBIAL INCISE DRAPE 6640EZ: Brand: IOBAN™ 2

## (undated) DEVICE — BAG DRAINAGE CUST DISP

## (undated) DEVICE — KERLIX BANDAGE ROLL: Brand: KERLIX

## (undated) DEVICE — INTENDED FOR TISSUE SEPARATION, AND OTHER PROCEDURES THAT REQUIRE A SHARP SURGICAL BLADE TO PUNCTURE OR CUT.: Brand: BARD-PARKER SAFETY BLADES SIZE 15, STERILE

## (undated) DEVICE — THREE-QUARTER SHEET: Brand: CONVERTORS

## (undated) DEVICE — GOWN ISOL IMPERV UNIV, DISP, OPEN BACK, BLUE --

## (undated) DEVICE — YANKAUER,SMOOTH HANDLE,HIGH CAPACITY: Brand: MEDLINE INDUSTRIES, INC.

## (undated) DEVICE — DRAPE,REIN 53X77,STERILE: Brand: MEDLINE

## (undated) DEVICE — DRAPE,UNDERBUTTOCKS,PCH,STERILE: Brand: MEDLINE

## (undated) DEVICE — SYR 50ML SLIP TIP NSAF LF STRL --

## (undated) DEVICE — KENDALL SCD EXPRESS SLEEVES, KNEE LENGTH, MEDIUM: Brand: KENDALL SCD

## (undated) DEVICE — BASIN EMESIS 500CC ROSE 250/CS 60/PLT: Brand: MEDEGEN MEDICAL PRODUCTS, LLC

## (undated) DEVICE — LIMB HOLDER, WRIST/ANKLE: Brand: DEROYAL

## (undated) DEVICE — X-RAY SPONGES,12 PLY: Brand: DERMACEA

## (undated) DEVICE — STOCKINETTE,IMPERVIOUS,12X48,STERILE: Brand: MEDLINE

## (undated) DEVICE — ELECTRODE PT RET AD L9FT HI MOIST COND ADH HYDRGEL CORDED

## (undated) DEVICE — UNDERPAD INCONT W23XL36IN STD BLU POLYPR BK FLUF SFT

## (undated) DEVICE — COVADERM: Brand: DEROYAL

## (undated) DEVICE — DRAPE STRL ANGIO W/ 2 FLD COLLCTN PCH 86X135 218X343 CM 2

## (undated) DEVICE — LINER SUCT CANSTR 3000CC PLAS SFT PRE ASSEMB W/OUT TBNG W/

## (undated) DEVICE — SYR 20ML LL STRL LF --

## (undated) DEVICE — FCPS RAD JAW 4LC 240CM W/NDL -- BX/20 RADIAL JAW 4

## (undated) DEVICE — TUBING IRRIG L77IN DIA0.241IN L BOR FOR CYSTO W/ NVENT

## (undated) DEVICE — PERCUTANEOUS ACCESS NEEDLE: Brand: NAVIGUIDE

## (undated) DEVICE — GOWN,REINFORCED,POLY,AURORA,XLARGE,STRL: Brand: MEDLINE

## (undated) DEVICE — HOOK LOCK LATEX FREE ELASTIC BANDAGE 4INX5YD

## (undated) DEVICE — KENDALL RADIOLUCENT FOAM MONITORING ELECTRODE RECTANGULAR SHAPE: Brand: KENDALL

## (undated) DEVICE — SUTURE MONOCRYL SZ 4 0 L18IN ABSRB VLT PS 1 L24MM 3 8 CIR REV Y682H

## (undated) DEVICE — Device

## (undated) DEVICE — MERITMEDICAL 7FR PRELUDE SNAP PEEL-AWAY

## (undated) DEVICE — BNDG CMPR ELAS KNT VEL STD 3IN -- MEDICHOICE

## (undated) DEVICE — PACEMAKER PACK: Brand: MEDLINE INDUSTRIES, INC.

## (undated) DEVICE — SYRINGE MED 50ML LUERSLIP TIP

## (undated) DEVICE — SNARE POLYP M W27MMXL240CM OVL STIFF DISP CAPTIVATOR

## (undated) DEVICE — FORCEPS BX L240CM JAW DIA2.4MM ORNG L CAP W/ NDL DISP RAD

## (undated) DEVICE — DRAPE TWL SURG 16X26IN BLU ORB04] ALLCARE INC]

## (undated) DEVICE — AIRLIFE™ NASAL OXYGEN CANNULA CURVED, FLARED TIP WITH 14 FOOT (4.3 M) CRUSH-RESISTANT TUBING, OVER-THE-EAR STYLE: Brand: AIRLIFE™

## (undated) DEVICE — Z DUP USE 2565107 PACK SURG PROC LEG CYSTO T-DRAPE REINF TBL CVR HND TWL